# Patient Record
Sex: FEMALE | Race: BLACK OR AFRICAN AMERICAN | NOT HISPANIC OR LATINO | Employment: FULL TIME | URBAN - METROPOLITAN AREA
[De-identification: names, ages, dates, MRNs, and addresses within clinical notes are randomized per-mention and may not be internally consistent; named-entity substitution may affect disease eponyms.]

---

## 2017-02-02 ENCOUNTER — TELEPHONE (OUTPATIENT)
Dept: SURGERY | Facility: CLINIC | Age: 57
End: 2017-02-02

## 2017-02-02 NOTE — TELEPHONE ENCOUNTER
Left message for pt to call back to schedule appt w/Dr Rose on Monday, 2/6/17.Will await call back from pt.

## 2017-02-06 ENCOUNTER — OFFICE VISIT (OUTPATIENT)
Dept: SURGERY | Facility: CLINIC | Age: 57
End: 2017-02-06
Payer: COMMERCIAL

## 2017-02-06 ENCOUNTER — HOSPITAL ENCOUNTER (OUTPATIENT)
Dept: RADIOLOGY | Facility: HOSPITAL | Age: 57
Discharge: HOME | End: 2017-02-06
Attending: SURGERY
Payer: COMMERCIAL

## 2017-02-06 VITALS
DIASTOLIC BLOOD PRESSURE: 84 MMHG | HEIGHT: 65 IN | TEMPERATURE: 98 F | SYSTOLIC BLOOD PRESSURE: 157 MMHG | HEART RATE: 98 BPM

## 2017-02-06 DIAGNOSIS — C50.912 MALIGNANT NEOPLASM OF LEFT FEMALE BREAST, UNSPECIFIED SITE OF BREAST: ICD-10-CM

## 2017-02-06 DIAGNOSIS — I10 ESSENTIAL HYPERTENSION: ICD-10-CM

## 2017-02-06 DIAGNOSIS — C50.912 MALIGNANT NEOPLASM OF LEFT FEMALE BREAST, UNSPECIFIED SITE OF BREAST: Primary | ICD-10-CM

## 2017-02-06 PROCEDURE — 99999 PR PBB SHADOW E&M-EST. PATIENT-LVL III: CPT | Mod: PBBFAC,,, | Performed by: SURGERY

## 2017-02-06 PROCEDURE — 99205 OFFICE O/P NEW HI 60 MIN: CPT | Mod: S$GLB,,, | Performed by: SURGERY

## 2017-02-06 PROCEDURE — 3079F DIAST BP 80-89 MM HG: CPT | Mod: S$GLB,,, | Performed by: SURGERY

## 2017-02-06 PROCEDURE — 3077F SYST BP >= 140 MM HG: CPT | Mod: S$GLB,,, | Performed by: SURGERY

## 2017-02-06 PROCEDURE — 76642 ULTRASOUND BREAST LIMITED: CPT | Mod: 26,LT,, | Performed by: RADIOLOGY

## 2017-02-06 PROCEDURE — 76642 ULTRASOUND BREAST LIMITED: CPT | Mod: TC,LT

## 2017-02-06 RX ORDER — AMLODIPINE AND BENAZEPRIL HYDROCHLORIDE 10; 40 MG/1; MG/1
CAPSULE ORAL
Refills: 3 | COMMUNITY
Start: 2017-02-02 | End: 2018-03-08 | Stop reason: SDUPTHER

## 2017-02-06 RX ORDER — FERROUS GLUCONATE 324(38)MG
TABLET ORAL
Refills: 3 | COMMUNITY
Start: 2016-12-01 | End: 2018-04-12

## 2017-02-06 RX ORDER — POTASSIUM CHLORIDE 20 MEQ/1
TABLET, EXTENDED RELEASE ORAL
Refills: 3 | COMMUNITY
Start: 2016-12-26 | End: 2018-03-08

## 2017-02-06 RX ORDER — HYDROCHLOROTHIAZIDE 25 MG/1
TABLET ORAL
Refills: 3 | COMMUNITY
Start: 2016-12-01 | End: 2018-03-08

## 2017-02-06 RX ORDER — METOPROLOL SUCCINATE 50 MG/1
TABLET, EXTENDED RELEASE ORAL
Refills: 3 | COMMUNITY
Start: 2016-12-01 | End: 2018-03-08

## 2017-02-06 NOTE — PROGRESS NOTES
Breast Surgery  [facility]  Department of Surgery      REFERRING PROVIDER: Alfredo English MD  [address] [place] [id number]    Chief Complaint: Consult (Consults  /Triple Neg Breast Cancer)      Subjective:      Patient ID: Ermelinda Verde is a 56 y.o. female who presents with newly diagnosed left breast cancer.    She felt a palpable abnormality in 2017 and was evaluated by her PCP who referred her to Dr. English for evaluation.     Follow-up mammogram (left breast) showed a 1.75 x 1.51 x 1.96cm lobulated, irregular solid mass in the left upper quadrant at 2 o'clock. An ultrasound guided biopsy was performed on 17 with pathology revealing infiltrating mammary carcinoma of the left breast.    Patient does routinely do self breast exams.  Patient has noted a change on breast exam.  Patient admits to left nipple discharge that is serous, spontaneous and intermittent and that has been present for at least 5 years. Patient denies to previous breast biopsy. Patient denies a personal history of breast cancer. She does have a sister who was diagnosed with breast cancer at age 52 or 53. Her sister underwent bilateral mastectomy with reconstruction; she did not need chemotherapy. Her sister might have had genetic testing.    Findings at that time were the following:   Tumor size: 1.75 x 1.51 x 1.96 cm   Tumor ndgndrndanddndend:nd nd2nd Estrogen Receptor: negative   Progesterone Receptor: negative   Her-2 brendan: negative   Lymph node status: unknown   Lymphatic invasion: unknown     GYN History:  Age of menarche was 11.  Last menstrual period was 2016. Patient denies hormonal therapy. Patient is . Age of first live birth was 21. Patient did breast feed for ~3 months per child.    Past Medical History   Diagnosis Date    Hypertension      Past Surgical History   Procedure Laterality Date    D&c       section      Uterine fibroid surgery       No current outpatient prescriptions on file  "prior to visit.     No current facility-administered medications on file prior to visit.      Social History     Social History    Marital status:      Spouse name: N/A    Number of children: N/A    Years of education: N/A     Occupational History    Not on file.     Social History Main Topics    Smoking status: Never Smoker    Smokeless tobacco: Not on file    Alcohol use Yes    Drug use: No    Sexual activity: Not on file     Other Topics Concern    Not on file     Social History Narrative    No narrative on file     Family History   Problem Relation Age of Onset    Heart disease Father     Breast cancer Sister      at age 52 or 53        Review of Systems   Constitutional: Negative for activity change and unexpected weight change.   Respiratory: Negative for cough and chest tightness.    Cardiovascular: Negative for chest pain, palpitations and leg swelling.   Gastrointestinal: Negative for abdominal distention, abdominal pain, blood in stool, constipation and nausea.   Endocrine: Negative.    Musculoskeletal: Negative for joint swelling, myalgias and neck stiffness.        Pain from having scoliosis   Skin: Negative for color change and wound.   Hematological: Negative.    Psychiatric/Behavioral: Negative.      Objective:     Visit Vitals    BP (!) 157/84 (BP Location: Right arm, Patient Position: Sitting, BP Method: Automatic)    Pulse 98    Temp 97.5 °F (36.4 °C)    Ht 5' 5" (1.651 m)    LMP 02/06/2016       Physical Exam   Constitutional: She is oriented to person, place, and time. She appears well-developed and well-nourished.   HENT:   Head: Normocephalic and atraumatic.   Cardiovascular: Normal rate, regular rhythm and normal heart sounds.    Pulmonary/Chest: Effort normal and breath sounds normal. No respiratory distress. She exhibits no tenderness. Right breast exhibits no inverted nipple, no mass, no nipple discharge, no skin change and no tenderness. Left breast exhibits mass " and nipple discharge. Left breast exhibits no inverted nipple, no skin change and no tenderness. Breasts are symmetrical.       Firm, mobile mass, non-adherent to skin or chest wall   Abdominal: Soft. Bowel sounds are normal. She exhibits no distension and no mass. There is no tenderness.   Musculoskeletal: She exhibits deformity.   Right clavicle more prominent than left clavicle at sternal edge   Neurological: She is alert and oriented to person, place, and time. She has normal reflexes.   Skin: Skin is warm and dry.         Radiology review: Images personally reviewed by me in the clinic.   Mammogram:    Ultrasound: lobulated hypoechoic lesion measuring 1.75 x 1.96 cm.    Clinic US today:  L br 2OC 10CFN  2.36x1.54 cm mass with irregular borders.  L axilla with multiple visible mostly normal appearing nodes      US today:  LEFT LIMITED ULTRASOUND FINDINGS:  There is an irregular solid mass measuring 2.5 x 2.2 x 1.5 cm seen in the left  breast at 10 o'clock located 7 centimeters from the nipple.    Targeted ultrasound was performed in the left axilla and there is no evidence of  lymphadenopathy.     Impression     Solid mass in the left breast is a known malignancy.    The patient reports that no biopsy marker was placed at the time of biopsy and  no outside post-procedure mammogram was provided for review.  If neoadjuvant  chemotherapy is planned, pre-therapy placement of marker is recommended.    There is no evidence of left axillary adenopathy.    ACR BI-RADS Category 6: Known breast carcinoma      JUANCHO MAYFIELD M.D.         Assessment:       Invasive Mammary Carcinoma UOQ L breast  Plan:     Options for management were discussed with the patient and her family. We reviewed the existing data noting the equivalency of breast conserving surgery with radiation therapy and mastectomy. We also reviewed the guidelines of the National Comprehensive Cancer Network for Stage IIA breast carcinoma. We discussed the  need for lumpectomy margins to be negative for carcinoma, the necessity for postoperative radiation therapy after breast conservation in most cases, the possibility of a failed or false negative sentinel lymph node biopsy and the potential need for complete lymphadenectomy for a failed or positive sentinel lymph node biopsy were fully discussed. In the setting of mastectomy, delayed or immediate reconstruction options are available and were discussed.     In the setting of lumpectomy, radiation therapy would be recommended majority of the time.  The duration and treatment side effects were discussed with the patient.  This will coordinated with the radiation oncologist pending final pathology.    We also discussed the role of systemic therapy in the treatment of breast cancer.  We discussed that this is based on tumor biology and alana status and will be determined based on final pathology.  We discussed that if the cancer is hormone positive, endocrine therapy would be recommended in most cases and its use can reduce the risk of recurrence as well as improve survival. Side effects of treatment were briefly discussed. We also discussed the potential role for chemotherapy based on a number of factors such as tumor phenotype (ER+ vs. triple negative vs. Jbk4lrb+) and this would be determined in coordination with the medical oncologist.      She understands that with her triple negative status, she is a candidate for neoadjuvant chemotherapy.  She has a sister who had breast cancer and was found to be gene positive for a mutation although she does not know the details.  She will work on obtaining her sister's gene results before her genetics appointment.  We have set her up for genetic counseling.  We will have her slides reviewed by path and potentially tested for ductal vs lobular in origin of her mammary carcinoma.  Radiology will also review her outside cynthia and if she undergoes neoadjuvant chemotherapy, we will  need to place a clip in the tumor since she did not have one placed at the time of biopsy.  An MRI has been scheduled for assessment of pre-neoadjuvant tumor and alana status for comparison of response.  A medical oncology appointment has been requested through [facility] for first available.  Axillary US today was negative for LAD.    She is unsure of what procedure she would want but is strongly considering bilateral mastectomies with reconstruction.    Patient was educated on breast cancer, receptors, wire localization lumpectomy, mastectomy, sentinel lymph node mapping and biopsy, axillary lymph node dissection, reconstruction, breast prosthesis with post-mastectomy bra and radiation therapy. Patient was given patient information binder including [facility] breast cancer treatment brochure.  All her questions were answered.    Total time spent with the patient: 70 minutes.  50 minutes of face to face consultation and 20 minutes of chart review and coordination of care.

## 2017-02-06 NOTE — PROGRESS NOTES
Distress Screening Results: Psychosocial Distress screening score of Distress Score: 1 noted and reviewed. No intervention indicated.

## 2017-02-06 NOTE — LETTER
February 6, 2017      Alfredo English MD  [address] [place] [id number]           [facility] Breast Surgery  [address]  [facility] [address]  Phone: [phone]          Patient: Ermelinda Verde   MR Number: [id number]   YOB: 1960   Date of Visit: 2/6/2017       Dear Dr. Alfredo English:    Thank you for referring Ermelinda Verde to me for evaluation. Attached you will find relevant portions of my assessment and plan of care.    If you have questions, please do not hesitate to call me. I look forward to following Ermelinda Verde along with you.    Sincerely,    Regina Rose MD    Enclosure  CC:  No Recipients    If you would like to receive this communication electronically, please contact [email or web address] or [phone] to request more information on [employer] Link access.    For providers and/or their staff who would like to refer a patient to [facility], please contact us through our one-stop-shop provider referral line, [facility], at [phone].    If you feel you have received this communication in error or would no longer like to receive these types of communications, please e-mail [email or web address]

## 2017-02-07 ENCOUNTER — HOSPITAL ENCOUNTER (OUTPATIENT)
Dept: RADIOLOGY | Facility: HOSPITAL | Age: 57
Discharge: HOME | End: 2017-02-07
Attending: SURGERY

## 2017-02-08 ENCOUNTER — TELEPHONE (OUTPATIENT)
Dept: HEMATOLOGY/ONCOLOGY | Facility: CLINIC | Age: 57
End: 2017-02-08

## 2017-02-08 DIAGNOSIS — C50.912 MALIGNANT NEOPLASM OF LEFT FEMALE BREAST, UNSPECIFIED SITE OF BREAST: Primary | ICD-10-CM

## 2017-02-08 NOTE — TELEPHONE ENCOUNTER
----- Message from Fouzia Maxwell sent at 2/7/2017  4:31 PM CST -----  Contact: self  Pt is returning a missed phone call.  Contact number [phone]

## 2017-02-09 ENCOUNTER — INITIAL CONSULT (OUTPATIENT)
Dept: HEMATOLOGY/ONCOLOGY | Facility: CLINIC | Age: 57
End: 2017-02-09
Attending: INTERNAL MEDICINE
Payer: COMMERCIAL

## 2017-02-09 VITALS
RESPIRATION RATE: 16 BRPM | SYSTOLIC BLOOD PRESSURE: 152 MMHG | WEIGHT: 213.63 LBS | HEART RATE: 97 BPM | TEMPERATURE: 98 F | DIASTOLIC BLOOD PRESSURE: 73 MMHG | BODY MASS INDEX: 35.55 KG/M2

## 2017-02-09 DIAGNOSIS — C50.412 BREAST CANCER OF UPPER-OUTER QUADRANT OF LEFT FEMALE BREAST: ICD-10-CM

## 2017-02-09 PROCEDURE — 99205 OFFICE O/P NEW HI 60 MIN: CPT | Mod: S$GLB,,, | Performed by: INTERNAL MEDICINE

## 2017-02-09 PROCEDURE — 3078F DIAST BP <80 MM HG: CPT | Mod: S$GLB,,, | Performed by: INTERNAL MEDICINE

## 2017-02-09 PROCEDURE — 99999 PR PBB SHADOW E&M-EST. PATIENT-LVL III: CPT | Mod: PBBFAC,,, | Performed by: INTERNAL MEDICINE

## 2017-02-09 PROCEDURE — 3077F SYST BP >= 140 MM HG: CPT | Mod: S$GLB,,, | Performed by: INTERNAL MEDICINE

## 2017-02-09 NOTE — LETTER
February 9, 2017      Regina Rose MD  [address]  [facility] [address]           [facility] - Hematology Oncology  [address]  [address]  [facility] [address]  Phone: [phone]          Patient: Ermelinda Verde   MR Number: [id number]   YOB: 1960   Date of Visit: 2/9/2017       Dear Dr. Regina Rose:    Thank you for referring Ermelinda Verde to me for evaluation. Attached you will find relevant portions of my assessment and plan of care.    If you have questions, please do not hesitate to call me. I look forward to following Ermelinda Verde along with you.    Sincerely,    Alex Reyna MD    Enclosure  CC:  No Recipients    If you would like to receive this communication electronically, please contact [email or web address] or [phone] to request more information on [employer] Link access.    For providers and/or their staff who would like to refer a patient to [facility], please contact us through our one-stop-shop provider referral line, [facility], at [phone].    If you feel you have received this communication in error or would no longer like to receive these types of communications, please e-mail [email or web address]

## 2017-02-09 NOTE — PROGRESS NOTES
Subjective:       Patient ID: Ermelinda Verde is a 56 y.o. female.    Chief Complaint: Breast Cancer    HPI Mrs Verde is  a very pleasant 56-year-old -American female referred by  for recent diagnosis of left breast cancer.    She developed a palpable abnormality in her left breast in January 2017 which she noted on self-examination.  A diagnostic mammogram on January 19 showed a greater than 1C meter nodule in the upper outer portion of left breast.  By ultrasound this was lobulated and hypoechoic measuring 1.75 x 1.51 x 1.96 cm.    On January 24, 2017 a core needle biopsy was performed which showed infiltrating ductal carcinoma, high grade.  The tumor was ER negative, OR negative, and HER-2 negative.  A follow-up ultrasound on December 6 showed 2.5 x 2.2 x 1.5 cm left breast mass.  There was no abnormality noted in the left axilla.  Mammogram -     The patient is seen for discussion regarding neoadjuvant therapy.    Menstrual History:    Menarche - 11   G -4   P - 3  First birth age -21 ,BCP -15 years    Menopause - last menstrual period June 2016.  D&C done at that time      HRT - no    Family History -                                 Breast - sister with breast cancer in her early 50s-questionable positive genetic testing                                Ovarian -negative                               Social History :    Smoking -  never           ETOH -occasional    Works as an       PMH: Hypertension, otherwise healthy    Review of Systems   Constitutional: Negative for activity change, appetite change, fatigue, fever and unexpected weight change.        Does not exercise   HENT: Positive for congestion and postnasal drip.    Respiratory: Positive for cough (occasional dry cough-related to ALLERGIES).    Gastrointestinal: Negative.         Colonoscopy 2011   Genitourinary: Negative.    Musculoskeletal: Positive for arthralgias (occasional knee and left hip pain).    Neurological: Negative.    Psychiatric/Behavioral: Negative.        Objective:      Physical Exam   Constitutional: She is oriented to person, place, and time. She appears well-developed and well-nourished. No distress.   HENT:   Head: Normocephalic and atraumatic.   Mouth/Throat: No oropharyngeal exudate.   Eyes: Conjunctivae and EOM are normal. Pupils are equal, round, and reactive to light. No scleral icterus.   Neck: Normal range of motion. Neck supple. No JVD present. No thyromegaly present.   Cardiovascular: Normal rate and normal heart sounds.    Pulmonary/Chest: Effort normal and breath sounds normal. She has no wheezes. She has no rales. Right breast exhibits no mass, no nipple discharge and no skin change. Left breast exhibits mass. Left breast exhibits no nipple discharge and no skin change.       Abdominal: Soft. She exhibits no mass. There is no tenderness.   Musculoskeletal: She exhibits no edema.   Lymphadenopathy:     She has no cervical adenopathy.     She has no axillary adenopathy.        Right: No supraclavicular adenopathy present.        Left: No supraclavicular adenopathy present.   Neurological: She is alert and oriented to person, place, and time. No cranial nerve deficit.   Skin: No rash noted. No erythema.   Psychiatric: She has a normal mood and affect. Her behavior is normal. Thought content normal.   Vitals reviewed.      Assessment:       1. Breast cancer of upper-outer quadrant of left female breast        Plan:       I discussed the rationale for neoadjuvant chemotherapy.  She has a borderline T1-T2 lesion with clinically negative nodes.  Chemotherapy would be an excellent plan especially if she is to have mastectomies with reconstruction.  It would be helpful to know her lymph node status in terms of optimizing her chemotherapy regimen and perhaps a sentinel lymph node biopsy could be performed at the time of her port placement.  She is also due for an MRI of the breast which may  be helpful as well.    I discussed general side effects of chemotherapy including alopecia, bone marrow suppression and gastrointestinal effects.  I'll see her back after additional information is available.

## 2017-02-13 ENCOUNTER — HOSPITAL ENCOUNTER (OUTPATIENT)
Dept: RADIOLOGY | Facility: HOSPITAL | Age: 57
Discharge: HOME | End: 2017-02-13
Attending: SURGERY
Payer: COMMERCIAL

## 2017-02-13 DIAGNOSIS — C50.912 MALIGNANT NEOPLASM OF LEFT FEMALE BREAST, UNSPECIFIED SITE OF BREAST: ICD-10-CM

## 2017-02-13 PROCEDURE — 63600175 PHARM REV CODE 636 W HCPCS: Performed by: RADIOLOGY

## 2017-02-13 PROCEDURE — 0159T MRI BREAST BILATERAL: CPT | Mod: TC

## 2017-02-13 PROCEDURE — A9577 INJ MULTIHANCE: HCPCS | Performed by: SURGERY

## 2017-02-13 PROCEDURE — 0159T MRI BREAST BILATERAL: CPT | Mod: 26,,, | Performed by: RADIOLOGY

## 2017-02-13 PROCEDURE — 88321 CONSLTJ&REPRT SLD PREP ELSWR: CPT | Mod: ,,, | Performed by: PATHOLOGY

## 2017-02-13 PROCEDURE — 77059 MRI BREAST BILATERAL: CPT | Mod: 26,,, | Performed by: RADIOLOGY

## 2017-02-13 PROCEDURE — 25500020 PHARM REV CODE 255: Performed by: SURGERY

## 2017-02-13 RX ORDER — MIDAZOLAM HYDROCHLORIDE 1 MG/ML
2 INJECTION INTRAMUSCULAR; INTRAVENOUS ONCE
Status: COMPLETED | OUTPATIENT
Start: 2017-02-13 | End: 2017-02-13

## 2017-02-13 RX ADMIN — MIDAZOLAM HYDROCHLORIDE 2 MG: 1 INJECTION, SOLUTION INTRAMUSCULAR; INTRAVENOUS at 08:02

## 2017-02-13 RX ADMIN — GADOBENATE DIMEGLUMINE 20 ML: 529 INJECTION, SOLUTION INTRAVENOUS at 08:02

## 2017-02-14 NOTE — PROGRESS NOTES
MD ordered versed 2mg iv as an anxiolytic for MRI, NKDA, pts  present in waiting room & will drive pt home, pt instructed not to drive until ashley am, pt verbalized understanding of all above

## 2017-02-15 ENCOUNTER — OFFICE VISIT (OUTPATIENT)
Dept: SURGERY | Facility: CLINIC | Age: 57
End: 2017-02-15
Payer: COMMERCIAL

## 2017-02-15 ENCOUNTER — HOSPITAL ENCOUNTER (OUTPATIENT)
Dept: RADIOLOGY | Facility: HOSPITAL | Age: 57
Discharge: HOME | End: 2017-02-15
Attending: SURGERY
Payer: COMMERCIAL

## 2017-02-15 DIAGNOSIS — C50.919 TRIPLE NEGATIVE MALIGNANT NEOPLASM OF BREAST: ICD-10-CM

## 2017-02-15 DIAGNOSIS — C50.919 BREAST CANCER: ICD-10-CM

## 2017-02-15 DIAGNOSIS — Z80.3 FAMILY HISTORY OF BREAST CANCER: ICD-10-CM

## 2017-02-15 PROCEDURE — 77065 DX MAMMO INCL CAD UNI: CPT | Mod: 26,LT,, | Performed by: RADIOLOGY

## 2017-02-15 PROCEDURE — 19285 PERQ DEV BREAST 1ST US IMAG: CPT | Mod: ,,, | Performed by: RADIOLOGY

## 2017-02-15 PROCEDURE — 77065 DX MAMMO INCL CAD UNI: CPT | Mod: TC,LT

## 2017-02-15 PROCEDURE — 19285 PERQ DEV BREAST 1ST US IMAG: CPT | Mod: TC

## 2017-02-15 PROCEDURE — A4648 IMPLANTABLE TISSUE MARKER: HCPCS

## 2017-02-15 PROCEDURE — 99213 OFFICE O/P EST LOW 20 MIN: CPT | Mod: S$GLB,,, | Performed by: NURSE PRACTITIONER

## 2017-02-15 NOTE — PROGRESS NOTES
Patient presents with her  to discuss genetic counseling, recent diagnosis of TNB, see pedigree for full family history, significant for a sister with breast cancer diagnosed at 52. Option for genetic testing discussed per current NCCN guidelines. Discussed sporadic verses family clustering verses sporadic cancer, types of cancer susceptibility genes, possible results and implications for self and family, and cost of testing/insurance. She desires to proceed with testing, informed consent obtained, buccal sample collected and sent to [facility] for Integrated [facility]AC[facility] with [facility]. Results expected in 2-3 weeks    Time in counseling 40 min, total time 40 min

## 2017-02-21 ENCOUNTER — TELEPHONE (OUTPATIENT)
Dept: HEMATOLOGY/ONCOLOGY | Facility: CLINIC | Age: 57
End: 2017-02-21

## 2017-02-21 NOTE — TELEPHONE ENCOUNTER
----- Message from Myrtle Johnson MA sent at 2/21/2017  2:30 PM CST -----  Please call with this appt March 1 at 12:00  ----- Message -----     From: Alex Reyna MD     Sent: 2/21/2017   1:48 PM       To: Myrtle Johnson MA    Will need to see her next week - 3/1 at 12  ----- Message -----     From: Regina Rose MD     Sent: 2/21/2017   8:20 AM       To: MD Dr. Axel Salazar,    Mrs. Ermelinda Verde (MRN [id number]) is having her port placed and sentinel node biopsy by Dr. English at [facility] tomorrow, 2/22.    I think your team was waiting to coordinate follow up/treatment dates for her depending on that, so I just wanted to update you.    Regina

## 2017-02-21 NOTE — TELEPHONE ENCOUNTER
Left message letting pt know about her apt on 3/1 with Dr. Reyna.Asked pt to call back with any concerns.

## 2017-03-01 ENCOUNTER — DOCUMENTATION ONLY (OUTPATIENT)
Dept: SURGERY | Facility: CLINIC | Age: 57
End: 2017-03-01

## 2017-03-01 ENCOUNTER — OFFICE VISIT (OUTPATIENT)
Dept: HEMATOLOGY/ONCOLOGY | Facility: CLINIC | Age: 57
End: 2017-03-01
Payer: COMMERCIAL

## 2017-03-01 VITALS — WEIGHT: 210.56 LBS | BODY MASS INDEX: 35.04 KG/M2

## 2017-03-01 DIAGNOSIS — Z51.11 ENCOUNTER FOR ANTINEOPLASTIC CHEMOTHERAPY: ICD-10-CM

## 2017-03-01 DIAGNOSIS — C50.412 BREAST CANCER OF UPPER-OUTER QUADRANT OF LEFT FEMALE BREAST: Primary | ICD-10-CM

## 2017-03-01 PROCEDURE — 99213 OFFICE O/P EST LOW 20 MIN: CPT | Mod: S$GLB,,, | Performed by: INTERNAL MEDICINE

## 2017-03-01 PROCEDURE — 1160F RVW MEDS BY RX/DR IN RCRD: CPT | Mod: S$GLB,,, | Performed by: INTERNAL MEDICINE

## 2017-03-01 PROCEDURE — 99999 PR PBB SHADOW E&M-EST. PATIENT-LVL I: CPT | Mod: PBBFAC,,, | Performed by: INTERNAL MEDICINE

## 2017-03-01 RX ORDER — DEXAMETHASONE 4 MG/1
TABLET ORAL
Qty: 40 TABLET | Refills: 0 | Status: SHIPPED | OUTPATIENT
Start: 2017-03-01 | End: 2017-06-20 | Stop reason: CLARIF

## 2017-03-01 RX ORDER — ONDANSETRON 4 MG/1
4 TABLET, ORALLY DISINTEGRATING ORAL EVERY 6 HOURS PRN
Qty: 20 TABLET | Refills: 3 | Status: SHIPPED | OUTPATIENT
Start: 2017-03-01 | End: 2017-03-08

## 2017-03-01 NOTE — PROGRESS NOTES
Results received from [facility], negative Integrated BRACAnalysis with [facility]. She was phoned and results discussed, likely a family clustering of breast cancer.

## 2017-03-01 NOTE — PROGRESS NOTES
Subjective:       Patient ID: Ermelinda Verde is a 56 y.o. female.    Chief Complaint: No chief complaint on file.    HPI Mrs Verde is  a very pleasant 56-year-old -American female referred by  for a diagnosis of left breast cancer.  She is also a patient of .  At the time of her last visit we had discussed neoadjuvant chemotherapy.  I recommended that she have a sentinel lymph node biopsy in order to help determine how much preoperative therapy would be indicated.  She underwent sentinel lymph node biopsy on February 22.  That showed 4 negative lymph nodes.    She developed a palpable abnormality in her left breast in January 2017 which she noted on self-examination.  A diagnostic mammogram on January 19 showed a greater than 1 cm nodule in the upper outer portion of left breast.  By ultrasound this was lobulated and hypoechoic measuring 1.75 x 1.51 x 1.96 cm.    On January 24, 2017 a core needle biopsy was performed which showed infiltrating ductal carcinoma, high grade.  The tumor was ER negative, W[place] negative, and HER-2 negative.  A follow-up ultrasound on December 6 showed 2.5 x 2.2 x 1.5 cm left breast mass.  There was no abnormality noted in the left axilla.        Review of Systems    Objective:      Physical Exam   Constitutional: She is oriented to person, place, and time. She appears well-developed and well-nourished.   HENT:   Head: Normocephalic and atraumatic.   Lymphadenopathy:     She has no axillary adenopathy.        Right: No supraclavicular adenopathy present.        Left: No supraclavicular adenopathy present.   Neurological: She is alert and oriented to person, place, and time.   Psychiatric: She has a normal mood and affect. Her behavior is normal. Thought content normal.       Assessment:       1. Breast cancer of upper-outer quadrant of left female breast    2. Encounter for antineoplastic chemotherapy        Plan:       I again reviewed the rationale for  neoadjuvant chemotherapy.  As she is pathologically node-negative I recommended that she received 4 cycles of docetaxel and Cytoxan therapy..    I discussed  side effects of chemotherapy including alopecia, bone marrow suppression and gastrointestinal effects.      Written informed consent was executed.    She would like to start next week.  The day 2 Neulasta and likely will set that up at [facility]

## 2017-03-01 NOTE — MR AVS SNAPSHOT
[facility]  [address]  [facility] [address]  Phone: [phone]                  Ermelinda Verde   3/1/2017 12:00 PM   Office Visit    Description:  Female : 1960   Provider:  Alex Reyna MD   Department:  [facility]           Diagnoses this Visit        Comments    Breast cancer of upper-outer quadrant of left female breast    -  Primary     Encounter for antineoplastic chemotherapy                To Do List           Goals (5 Years of Data)     None      Follow-Up and Disposition     Return in about 4 weeks (around 3/29/2017).       These Medications        Disp Refills Start End    dexamethasone (DECADRON) 4 MG Tab 40 tablet 0 3/1/2017     Take the day before and the day after chemotherapy.    Pharmacy: [facility] AT [facility] Ph #: [phone]       ondansetron (ZOFRAN-ODT) 4 MG TbDL 20 tablet 3 3/1/2017 3/8/2017    Take 1 tablet (4 mg total) by mouth every 6 (six) hours as needed. For nausea - Oral    Pharmacy: [facility] [place] - [address] AT [facility] Ph #: [phone]         [facility] On Call     [facility] On Call Nurse Care Line - 24/7 Assistance  Registered nurses in the [facility] provide clinical advisement, health education, appointment booking, and other advisory services.  Call for this free service at [phone].             Medications           START taking these NEW medications        Refills    dexamethasone (DECADRON) 4 MG Tab 0    Sig: Take the day before and the day after chemotherapy.    Class: Normal    ondansetron (ZOFRAN-ODT) 4 MG TbDL 3    Sig: Take 1 tablet (4 mg total) by mouth every 6 (six) hours as needed. For nausea    Class: Normal    Route: Oral           Verify that the below list of medications is an accurate representation of the medications you are currently taking.  If none  reported, the list may be blank. If incorrect, please contact your healthcare provider. Carry this list with you in case of emergency.           Current Medications     amlodipine-benazepril (LOTREL) 10-40 mg per capsule TK 1 C PO QD    dexamethasone (DECADRON) 4 MG Tab Take the day before and the day after chemotherapy.    ferrous gluconate (FERGON) 324 MG tablet TK 1 T PO QD    hydrochlorothiazide (HYDRODIURIL) 25 MG tablet TK 1 T PO ONCE A DAY    hydrocodone-acetaminophen 5-325mg (NORCO) 5-325 mg per tablet Take 1-2 every 6 hours prn pain.    metoprolol succinate (TOPROL-XL) 50 MG 24 hr tablet TK 1/2 T PO QD    ondansetron (ZOFRAN-ODT) 4 MG TbDL Take 1 tablet (4 mg total) by mouth every 6 (six) hours as needed. For nausea    potassium chloride SA (K-DUR,KLOR-CON) 20 MEQ tablet TK 1 T PO  QD           Clinical Reference Information           Your Vitals Were     Weight                   95.5 kg (210 lb 8.6 oz)           Allergies as of 3/1/2017     No Known Allergies      Immunizations Administered on Date of Encounter - 3/1/2017     None      [facility] Sign-Up     Activating your [facility] account is as easy as 1-2-3!     1) Visit [email or web address], select Sign Up Now, enter this activation code and your date of birth, then select Next.  [email or web address]  Expires: 3/31/2017  4:22 PM      2) Create a username and password to use when you visit [facility] in the future and select a security question in case you lose your password and select Next.    3) Enter your e-mail address and click Sign Up!    Additional Information  If you have questions, please e-mail [email or web address] or call [phone] to talk to our [facility] staff. Remember, [facility] is NOT to be used for urgent needs. For medical emergencies, dial 911.         Language Assistance Services     ATTENTION: Language assistance services are available, free of charge. Please call [phone].      ATENCIÓN: Si kendra corrales, irma a kwong disposición  servicios gratuitos de asistencia lingüística. Nydia spencer [phone].     [facility] Ý: N?u b?n nói Ti?ng Vi?t, có các d?ch v? h? tr? ngôn ng? mi?n phí dành cho b?n. G?i s? [phone].         [facility] complies with applicable Federal civil rights laws and does not discriminate on the basis of race, color, national origin, age, disability, or sex.

## 2017-03-07 ENCOUNTER — NURSE TRIAGE (OUTPATIENT)
Dept: ADMINISTRATIVE | Facility: CLINIC | Age: 57
End: 2017-03-07

## 2017-03-07 ENCOUNTER — INFUSION (OUTPATIENT)
Dept: INFUSION THERAPY | Facility: HOSPITAL | Age: 57
End: 2017-03-07
Attending: INTERNAL MEDICINE
Payer: COMMERCIAL

## 2017-03-07 ENCOUNTER — LAB VISIT (OUTPATIENT)
Dept: LAB | Facility: HOSPITAL | Age: 57
End: 2017-03-07
Attending: INTERNAL MEDICINE
Payer: COMMERCIAL

## 2017-03-07 VITALS
DIASTOLIC BLOOD PRESSURE: 79 MMHG | HEIGHT: 65 IN | HEART RATE: 75 BPM | WEIGHT: 209 LBS | RESPIRATION RATE: 18 BRPM | BODY MASS INDEX: 34.82 KG/M2 | SYSTOLIC BLOOD PRESSURE: 134 MMHG | TEMPERATURE: 98 F

## 2017-03-07 DIAGNOSIS — Z51.11 ENCOUNTER FOR ANTINEOPLASTIC CHEMOTHERAPY: ICD-10-CM

## 2017-03-07 DIAGNOSIS — C50.412 BREAST CANCER OF UPPER-OUTER QUADRANT OF LEFT FEMALE BREAST: ICD-10-CM

## 2017-03-07 DIAGNOSIS — Z51.11 ENCOUNTER FOR ANTINEOPLASTIC CHEMOTHERAPY: Primary | ICD-10-CM

## 2017-03-07 LAB
ALBUMIN SERPL BCP-MCNC: 3.6 G/DL
ALP SERPL-CCNC: 92 U/L
ALT SERPL W/O P-5'-P-CCNC: 9 U/L
ANION GAP SERPL CALC-SCNC: 9 MMOL/L
AST SERPL-CCNC: 17 U/L
BASOPHILS # BLD AUTO: 0.02 K/UL
BASOPHILS NFR BLD: 0.2 %
BILIRUB SERPL-MCNC: 0.3 MG/DL
BUN SERPL-MCNC: 11 MG/DL
CALCIUM SERPL-MCNC: 9.4 MG/DL
CHLORIDE SERPL-SCNC: 106 MMOL/L
CO2 SERPL-SCNC: 24 MMOL/L
CREAT SERPL-MCNC: 0.9 MG/DL
DIFFERENTIAL METHOD: ABNORMAL
EOSINOPHIL # BLD AUTO: 0.1 K/UL
EOSINOPHIL NFR BLD: 0.5 %
ERYTHROCYTE [DISTWIDTH] IN BLOOD BY AUTOMATED COUNT: 14.6 %
EST. GFR  (AFRICAN AMERICAN): >60 ML/MIN/1.73 M^2
EST. GFR  (NON AFRICAN AMERICAN): >60 ML/MIN/1.73 M^2
GLUCOSE SERPL-MCNC: 65 MG/DL
HCT VFR BLD AUTO: 39.7 %
HGB BLD-MCNC: 12.5 G/DL
LYMPHOCYTES # BLD AUTO: 2.4 K/UL
LYMPHOCYTES NFR BLD: 23.6 %
MCH RBC QN AUTO: 26.3 PG
MCHC RBC AUTO-ENTMCNC: 31.5 %
MCV RBC AUTO: 84 FL
MONOCYTES # BLD AUTO: 0.9 K/UL
MONOCYTES NFR BLD: 9.2 %
NEUTROPHILS # BLD AUTO: 6.7 K/UL
NEUTROPHILS NFR BLD: 66.3 %
PLATELET # BLD AUTO: 247 K/UL
PMV BLD AUTO: 9.1 FL
POTASSIUM SERPL-SCNC: 3.9 MMOL/L
PROT SERPL-MCNC: 7.9 G/DL
RBC # BLD AUTO: 4.75 M/UL
SODIUM SERPL-SCNC: 139 MMOL/L
WBC # BLD AUTO: 10.14 K/UL

## 2017-03-07 PROCEDURE — 80053 COMPREHEN METABOLIC PANEL: CPT

## 2017-03-07 PROCEDURE — 85025 COMPLETE CBC W/AUTO DIFF WBC: CPT

## 2017-03-07 PROCEDURE — 96377 APPLICATON ON-BODY INJECTOR: CPT

## 2017-03-07 PROCEDURE — 63600175 PHARM REV CODE 636 W HCPCS: Performed by: INTERNAL MEDICINE

## 2017-03-07 PROCEDURE — 25000003 PHARM REV CODE 250: Performed by: INTERNAL MEDICINE

## 2017-03-07 PROCEDURE — 96411 CHEMO IV PUSH ADDL DRUG: CPT

## 2017-03-07 PROCEDURE — 96413 CHEMO IV INFUSION 1 HR: CPT

## 2017-03-07 PROCEDURE — 36415 COLL VENOUS BLD VENIPUNCTURE: CPT

## 2017-03-07 PROCEDURE — 96367 TX/PROPH/DG ADDL SEQ IV INF: CPT

## 2017-03-07 RX ORDER — SODIUM CHLORIDE 0.9 % (FLUSH) 0.9 %
10 SYRINGE (ML) INJECTION
Status: DISCONTINUED | OUTPATIENT
Start: 2017-03-07 | End: 2017-03-07 | Stop reason: HOSPADM

## 2017-03-07 RX ORDER — HEPARIN 100 UNIT/ML
500 SYRINGE INTRAVENOUS
Status: DISCONTINUED | OUTPATIENT
Start: 2017-03-07 | End: 2017-03-07 | Stop reason: HOSPADM

## 2017-03-07 RX ADMIN — CYCLOPHOSPHAMIDE 1255 MG: 1 INJECTION, POWDER, FOR SOLUTION INTRAVENOUS; ORAL at 01:03

## 2017-03-07 RX ADMIN — SODIUM CHLORIDE, PRESERVATIVE FREE 10 ML: 5 INJECTION INTRAVENOUS at 02:03

## 2017-03-07 RX ADMIN — SODIUM CHLORIDE: 0.9 INJECTION, SOLUTION INTRAVENOUS at 11:03

## 2017-03-07 RX ADMIN — SODIUM CHLORIDE 150 MG: 9 INJECTION, SOLUTION INTRAVENOUS at 11:03

## 2017-03-07 RX ADMIN — DOCETAXEL ANHYDROUS 157.5 MG: 10 INJECTION, SOLUTION INTRAVENOUS at 12:03

## 2017-03-07 RX ADMIN — PEGFILGRASTIM 6 MG: KIT SUBCUTANEOUS at 02:03

## 2017-03-07 RX ADMIN — HEPARIN 500 UNITS: 100 SYRINGE at 02:03

## 2017-03-07 RX ADMIN — DEXAMETHASONE SODIUM PHOSPHATE 0.25 MG: 4 INJECTION, SOLUTION INTRAMUSCULAR; INTRAVENOUS at 11:03

## 2017-03-07 NOTE — MR AVS SNAPSHOT
Patient Information     Patient Name Sex Ermelinda Wilson Female 1960      Visit Information        Provider Department [facility]    3/7/2017 11:00 AM [facility], [facility] [phone] Adolfo Moreira      Patient Instructions     None      Your Current Medications Are     amlodipine-benazepril (LOTREL) 10-40 mg per capsule    dexamethasone (DECADRON) 4 MG Tab    ferrous gluconate (FERGON) 324 MG tablet    hydrochlorothiazide (HYDRODIURIL) 25 MG tablet    hydrocodone-acetaminophen 5-325mg (NORCO) 5-325 mg per tablet    metoprolol succinate (TOPROL-XL) 50 MG 24 hr tablet    ondansetron (ZOFRAN-ODT) 4 MG TbDL    potassium chloride SA (K-DUR,KLOR-CON) 20 MEQ tablet      Facility-Administered Medications     cyclophosphamide (CYTOXAN) 600 mg/m2 = 1,255 mg in sodium chloride 0.9% 250 mL chemo infusion    docetaxel (TAXOTERE) 75 mg/m2 = 157.5 mg in sodium chloride 0.9% 250 mL chemo infusion    fosaprepitant 150 mg in sodium chloride 0.9% 150 mL IVPB    heparin, porcine (PF) 100 unit/mL injection flush 500 Units    palonosetron 0.25mg/dexamethasone 10mg IVPB    pegfilgrastim (NEULASTA (ON BODY INJECTOR)) injection 6 mg    sodium chloride 0.9% 250 mL flush bag    sodium chloride 0.9% flush 10 mL      Appointments for Next Year     3/8/2017 10:45 AM POST-OP (15 min.) [facility] Alfredo English MD    Arrive at check-in approximately 15 minutes before your scheduled appointment time. Bring all outside medical records and imaging, along with a list of your current medications and insurance card.    3/28/2017 10:15 AM NON FASTING LAB (15 min.) [facility] LAB, HEMON SAME DAY    Arrive at check-in approximately 15 minutes before your scheduled appointment time. Bring all outside medical records and imaging, along with a list of your current medications and insurance card.    [facility] 3rd Floor    3/28/2017 11:15 AM ESTABLISHED PATIENT SHORT (15  "min.) [facility] Alex Reyna MD    Arrive at check-in approximately 15 minutes before your scheduled appointment time. Bring all outside medical records and imaging, along with a list of your current medications and insurance card.    [facility] - 3rd Floor    3/28/2017 11:30 AM INFUSION 180 MIN (180 min.) [facility], CHEMO    Arrive at check-in approximately 15 minutes before your scheduled appointment time. Bring all outside medical records and imaging, along with a list of your current medications and insurance card.    [facility], 5th Floor         Default Flowsheet Data (last 24 hours)      Amb Complex Vitals Derrell        03/07/17 1318 03/07/17 1102             Measurements    Weight  94.8 kg (209 lb)       Height  5' 5" (1.651 m)       BSA (Calculated - sq m)  2.08 sq meters       BMI (Calculated)  34.9       /79 139/79       Temp  98.2 °F (36.8 °C)       Pulse 75 86       Resp 18 20       Pain Assessment    Pain Score Zero Zero               Allergies     No Known Allergies      Medications You Received from 03/06/2017 1434 to 03/07/2017 1434        Date/Time Order Dose Route Action     03/07/2017 1323 cyclophosphamide (CYTOXAN) 600 mg/m2 = 1,255 mg in sodium chloride 0.9% 250 mL chemo infusion 1,255 mg Intravenous New Bag     03/07/2017 1214 docetaxel (TAXOTERE) 75 mg/m2 = 157.5 mg in sodium chloride 0.9% 250 mL chemo infusion 157.5 mg Intravenous New Bag     03/07/2017 1144 fosaprepitant 150 mg in sodium chloride 0.9% 150 mL IVPB 150 mg Intravenous New Bag     03/07/2017 1432 heparin, porcine (PF) 100 unit/mL injection flush 500 Units 500 Units Intravenous Given     03/07/2017 1121 palonosetron 0.25mg/dexamethasone 10mg IVPB 0.25 mg Intravenous New Bag     03/07/2017 1427 pegfilgrastim (NEULASTA (ON BODY INJECTOR)) injection 6 mg 6 mg Subcutaneous Given     03/07/2017 1121 sodium chloride 0.9% 250 mL flush bag   Intravenous New Bag     " 03/07/2017 1432 sodium chloride 0.9% flush 10 mL 10 mL Intravenous Given      Current Discharge Medication List     Cannot display discharge medications since this is not an admission.

## 2017-03-07 NOTE — PLAN OF CARE
Problem: Patient Care Overview (Adult)  Goal: Plan of Care Review  Outcome: Ongoing (interventions implemented as appropriate)  Pt tolerated 1st treatment of Taxotere/Cytoxan well.  Reviewed side effects and management of side effects of chemo.  Pt given Neulasta OBI video to watch.  OBI applied to pt and times of when medication will be delivered and when she can take the device off written down and given to pt.  All questions answered.  Instructed to call MD with any problems.

## 2017-03-08 NOTE — TELEPHONE ENCOUNTER
Reason for Disposition   Caller has NON-URGENT medication question about med that PCP prescribed and triager unable to answer question    Protocols used: ST MEDICATION QUESTION CALL-A-SHARAN Wadsworth called to question the decadron she is taking.  She said she thinks Yessenia Pete, today mentioned she should be taking the decadron 4 mg once in the morning, once in the evening, on the day before and the day after her infusion, but she is not sure.  Explained to her the epic medication list does not indicate any change from the order to take one 4 mg tablet the day before and the day after chemo infusion.  Nor did I see any note indicating the nurse or Dr Reyna wanted a change in how it was taken.  Encouraged her to call tomorrow morning for clarification, and assured her I will send a message to Dr Reyna's staff for any additional instruction.  Please contact caller directly with any additional care advice.

## 2017-03-13 ENCOUNTER — NURSE TRIAGE (OUTPATIENT)
Dept: ADMINISTRATIVE | Facility: CLINIC | Age: 57
End: 2017-03-13

## 2017-03-14 ENCOUNTER — TELEPHONE (OUTPATIENT)
Dept: HEMATOLOGY/ONCOLOGY | Facility: CLINIC | Age: 57
End: 2017-03-14

## 2017-03-14 NOTE — TELEPHONE ENCOUNTER
Called patient and she had questions about if she should work during chemo therapy. Patient said she went to work yesterday and had a pain in her back. I told the patient that would be her decision and it depended on how she was feeling and if she would be able to continue to work. I told her Dr Reyna would be happy to let her take a break while doing chemo. Patient will let us know

## 2017-03-14 NOTE — TELEPHONE ENCOUNTER
----- Message from Matthew Sanders sent at 3/14/2017  3:48 PM CDT -----  Contact: PT  Has a few questions regarding her symptoms. She has some concerns.     Call: [phone]

## 2017-03-25 ENCOUNTER — NURSE TRIAGE (OUTPATIENT)
Dept: ADMINISTRATIVE | Facility: CLINIC | Age: 57
End: 2017-03-25

## 2017-03-25 NOTE — TELEPHONE ENCOUNTER
Patient states 3 weeks post chemo her breast is swelling and reddened at the nipple and the nipple cannot be grasped.  She is concerned because this is happening overnight.

## 2017-03-25 NOTE — TELEPHONE ENCOUNTER
"  Reason for Disposition   Caller has URGENT medication question about med that PCP prescribed and triager unable to answer question    Answer Assessment - Initial Assessment Questions  1. SYMPTOMS: "Do you have any symptoms?"      Breast enlarged and redness at nipples.  2. SEVERITY: If symptoms are present, ask "Are they mild, moderate or severe?"      Patient concerns escalating.    Protocols used: ST MEDICATION QUESTION CALL-A-AH    "

## 2017-03-25 NOTE — TELEPHONE ENCOUNTER
Spoke to Dr. Garcia advised to call Dr. Walter. Called to Dr. Walter.  Advised to have patient report to the ED if any further changes are noted.  At this time patient is reporting breast swelling with redness ar the areola and inability to pinch nipple,.  No fever or pain at this time.  Changes has happened over night.   Only feeling of pain was sharp and happened a few days ago.  Patient voiced understanding of noting changes and going to the ED.

## 2017-03-28 ENCOUNTER — INFUSION (OUTPATIENT)
Dept: INFUSION THERAPY | Facility: HOSPITAL | Age: 57
End: 2017-03-28
Attending: INTERNAL MEDICINE
Payer: COMMERCIAL

## 2017-03-28 ENCOUNTER — LAB VISIT (OUTPATIENT)
Dept: LAB | Facility: HOSPITAL | Age: 57
End: 2017-03-28
Attending: INTERNAL MEDICINE
Payer: COMMERCIAL

## 2017-03-28 ENCOUNTER — OFFICE VISIT (OUTPATIENT)
Dept: HEMATOLOGY/ONCOLOGY | Facility: CLINIC | Age: 57
End: 2017-03-28
Payer: COMMERCIAL

## 2017-03-28 VITALS
BODY MASS INDEX: 35.16 KG/M2 | RESPIRATION RATE: 16 BRPM | TEMPERATURE: 98 F | DIASTOLIC BLOOD PRESSURE: 71 MMHG | WEIGHT: 211 LBS | HEIGHT: 65 IN | SYSTOLIC BLOOD PRESSURE: 131 MMHG | HEART RATE: 78 BPM

## 2017-03-28 VITALS
TEMPERATURE: 98 F | HEART RATE: 98 BPM | WEIGHT: 211.63 LBS | RESPIRATION RATE: 17 BRPM | SYSTOLIC BLOOD PRESSURE: 145 MMHG | DIASTOLIC BLOOD PRESSURE: 69 MMHG | BODY MASS INDEX: 35.22 KG/M2

## 2017-03-28 DIAGNOSIS — C50.412 BREAST CANCER OF UPPER-OUTER QUADRANT OF LEFT FEMALE BREAST: ICD-10-CM

## 2017-03-28 DIAGNOSIS — Z51.11 ENCOUNTER FOR ANTINEOPLASTIC CHEMOTHERAPY: ICD-10-CM

## 2017-03-28 DIAGNOSIS — Z51.11 ENCOUNTER FOR ANTINEOPLASTIC CHEMOTHERAPY: Primary | ICD-10-CM

## 2017-03-28 DIAGNOSIS — C50.412 BREAST CANCER OF UPPER-OUTER QUADRANT OF LEFT FEMALE BREAST: Primary | ICD-10-CM

## 2017-03-28 LAB
ALBUMIN SERPL BCP-MCNC: 3.4 G/DL
ALP SERPL-CCNC: 92 U/L
ALT SERPL W/O P-5'-P-CCNC: 9 U/L
ANION GAP SERPL CALC-SCNC: 8 MMOL/L
AST SERPL-CCNC: 15 U/L
BASOPHILS # BLD AUTO: 0.01 K/UL
BASOPHILS NFR BLD: 0.1 %
BILIRUB SERPL-MCNC: 0.2 MG/DL
BUN SERPL-MCNC: 8 MG/DL
CALCIUM SERPL-MCNC: 9.4 MG/DL
CHLORIDE SERPL-SCNC: 108 MMOL/L
CO2 SERPL-SCNC: 23 MMOL/L
CREAT SERPL-MCNC: 0.8 MG/DL
DIFFERENTIAL METHOD: ABNORMAL
EOSINOPHIL # BLD AUTO: 0 K/UL
EOSINOPHIL NFR BLD: 0 %
ERYTHROCYTE [DISTWIDTH] IN BLOOD BY AUTOMATED COUNT: 15.6 %
EST. GFR  (AFRICAN AMERICAN): >60 ML/MIN/1.73 M^2
EST. GFR  (NON AFRICAN AMERICAN): >60 ML/MIN/1.73 M^2
GLUCOSE SERPL-MCNC: 143 MG/DL
HCT VFR BLD AUTO: 35.2 %
HGB BLD-MCNC: 11.5 G/DL
LYMPHOCYTES # BLD AUTO: 0.9 K/UL
LYMPHOCYTES NFR BLD: 8.7 %
MCH RBC QN AUTO: 26.4 PG
MCHC RBC AUTO-ENTMCNC: 32.7 %
MCV RBC AUTO: 81 FL
MONOCYTES # BLD AUTO: 0.8 K/UL
MONOCYTES NFR BLD: 7.9 %
NEUTROPHILS # BLD AUTO: 8.6 K/UL
NEUTROPHILS NFR BLD: 83.1 %
PLATELET # BLD AUTO: 351 K/UL
PMV BLD AUTO: 8.8 FL
POTASSIUM SERPL-SCNC: 3.8 MMOL/L
PROT SERPL-MCNC: 7.4 G/DL
RBC # BLD AUTO: 4.36 M/UL
SODIUM SERPL-SCNC: 139 MMOL/L
WBC # BLD AUTO: 10.31 K/UL

## 2017-03-28 PROCEDURE — 99214 OFFICE O/P EST MOD 30 MIN: CPT | Mod: S$GLB,,, | Performed by: INTERNAL MEDICINE

## 2017-03-28 PROCEDURE — 1160F RVW MEDS BY RX/DR IN RCRD: CPT | Mod: S$GLB,,, | Performed by: INTERNAL MEDICINE

## 2017-03-28 PROCEDURE — 3077F SYST BP >= 140 MM HG: CPT | Mod: S$GLB,,, | Performed by: INTERNAL MEDICINE

## 2017-03-28 PROCEDURE — 36415 COLL VENOUS BLD VENIPUNCTURE: CPT

## 2017-03-28 PROCEDURE — 63600175 PHARM REV CODE 636 W HCPCS: Performed by: INTERNAL MEDICINE

## 2017-03-28 PROCEDURE — 80053 COMPREHEN METABOLIC PANEL: CPT

## 2017-03-28 PROCEDURE — 96413 CHEMO IV INFUSION 1 HR: CPT

## 2017-03-28 PROCEDURE — 96417 CHEMO IV INFUS EACH ADDL SEQ: CPT

## 2017-03-28 PROCEDURE — 96367 TX/PROPH/DG ADDL SEQ IV INF: CPT

## 2017-03-28 PROCEDURE — 25000003 PHARM REV CODE 250: Performed by: INTERNAL MEDICINE

## 2017-03-28 PROCEDURE — 3078F DIAST BP <80 MM HG: CPT | Mod: S$GLB,,, | Performed by: INTERNAL MEDICINE

## 2017-03-28 PROCEDURE — 99999 PR PBB SHADOW E&M-EST. PATIENT-LVL III: CPT | Mod: PBBFAC,,, | Performed by: INTERNAL MEDICINE

## 2017-03-28 PROCEDURE — 85025 COMPLETE CBC W/AUTO DIFF WBC: CPT

## 2017-03-28 PROCEDURE — 96377 APPLICATON ON-BODY INJECTOR: CPT

## 2017-03-28 RX ORDER — HEPARIN 100 UNIT/ML
500 SYRINGE INTRAVENOUS
Status: CANCELLED | OUTPATIENT
Start: 2017-03-28

## 2017-03-28 RX ORDER — SODIUM CHLORIDE 0.9 % (FLUSH) 0.9 %
10 SYRINGE (ML) INJECTION
Status: DISCONTINUED | OUTPATIENT
Start: 2017-03-28 | End: 2017-03-28 | Stop reason: HOSPADM

## 2017-03-28 RX ORDER — UBIDECARENONE 75 MG
500 CAPSULE ORAL DAILY
Status: ON HOLD | COMMUNITY
End: 2019-02-13 | Stop reason: HOSPADM

## 2017-03-28 RX ORDER — HEPARIN 100 UNIT/ML
500 SYRINGE INTRAVENOUS
Status: DISCONTINUED | OUTPATIENT
Start: 2017-03-28 | End: 2017-03-28 | Stop reason: HOSPADM

## 2017-03-28 RX ORDER — LIDOCAINE AND PRILOCAINE 25; 25 MG/G; MG/G
CREAM TOPICAL
Qty: 5 G | Refills: 1 | Status: SHIPPED | OUTPATIENT
Start: 2017-03-28 | End: 2017-06-20 | Stop reason: CLARIF

## 2017-03-28 RX ORDER — SODIUM CHLORIDE 0.9 % (FLUSH) 0.9 %
10 SYRINGE (ML) INJECTION
Status: CANCELLED | OUTPATIENT
Start: 2017-03-28

## 2017-03-28 RX ADMIN — DEXAMETHASONE SODIUM PHOSPHATE 0.25 MG: 4 INJECTION, SOLUTION INTRAMUSCULAR; INTRAVENOUS at 12:03

## 2017-03-28 RX ADMIN — SODIUM CHLORIDE 150 MG: 9 INJECTION, SOLUTION INTRAVENOUS at 01:03

## 2017-03-28 RX ADMIN — PEGFILGRASTIM 6 MG: KIT SUBCUTANEOUS at 04:03

## 2017-03-28 RX ADMIN — SODIUM CHLORIDE, PRESERVATIVE FREE 500 UNITS: 5 INJECTION INTRAVENOUS at 04:03

## 2017-03-28 RX ADMIN — SODIUM CHLORIDE, PRESERVATIVE FREE 10 ML: 5 INJECTION INTRAVENOUS at 04:03

## 2017-03-28 RX ADMIN — SODIUM CHLORIDE: 0.9 INJECTION, SOLUTION INTRAVENOUS at 12:03

## 2017-03-28 RX ADMIN — CYCLOPHOSPHAMIDE 1255 MG: 1 INJECTION, POWDER, FOR SOLUTION INTRAVENOUS; ORAL at 03:03

## 2017-03-28 RX ADMIN — DOCETAXEL ANHYDROUS 155 MG: 10 INJECTION, SOLUTION INTRAVENOUS at 02:03

## 2017-03-28 NOTE — PLAN OF CARE
Problem: Patient Care Overview  Goal: Plan of Care Review  Outcome: Ongoing (interventions implemented as appropriate)  Patient received Docetaxel and Cyclophophamide without any issues. Notified to call MD with any further concerns . No apparent distress noted.

## 2017-03-28 NOTE — PROGRESS NOTES
Subjective:       Patient ID: Ermelinda Verde is a 56 y.o. female.    Chief Complaint: Chemotherapy    HPI Mrs Verde is  a very pleasant 56-year-old -American female referred by  for a diagnosis of left breast cancer.  She is also a patient of .  She presents to continue neoadjuvant chemotherapy.  She is status post 1 cycle of Taxotere and Cytoxan.    Her major side effect with some musculoskeletal discomfort.  She had some loose bowels which were controlled by Imodium.  She had no oral sores.  Tongue did get thick and coated and white with loss of taste.  She's had no shortness of breath although her energy level was down earlier in the cycle is back up to normal.  She only missed about 3 days of work.    She developed a palpable abnormality in her left breast in January 2017 which she noted on self-examination.  A diagnostic mammogram on January 19 showed a greater than 1 cm nodule in the upper outer portion of left breast.  By ultrasound this was lobulated and hypoechoic measuring 1.75 x 1.51 x 1.96 cm.    On January 24, 2017 a core needle biopsy was performed which showed infiltrating ductal carcinoma, high grade.  The tumor was ER negative, ME negative, and HER-2 negative.  A follow-up ultrasound on December 6 showed 2.5 x 2.2 x 1.5 cm left breast mass.  There was no abnormality noted in the left axilla.     I recommended that she have a sentinel lymph node biopsy in order to help determine how much preoperative therapy would be indicated.  She underwent sentinel lymph node biopsy on February 22.  That showed 4 negative lymph nodes.    Review of Systems   Constitutional: Positive for appetite change (taste is off). Negative for activity change, fatigue, fever and unexpected weight change.   Respiratory: Negative for cough and shortness of breath.    Cardiovascular: Negative for chest pain.   Gastrointestinal: Negative for abdominal pain and constipation. Diarrhea: bowels were loose.    Musculoskeletal: Negative for back pain and neck pain.   Neurological: Negative for headaches.   Psychiatric/Behavioral: Negative for dysphoric mood. The patient is not nervous/anxious.        Objective:      Physical Exam   Constitutional: She is oriented to person, place, and time. She appears well-developed and well-nourished.   HENT:   Head: Normocephalic and atraumatic.   Mouth/Throat: Oropharynx is clear and moist. No oropharyngeal exudate.   Eyes: No scleral icterus.   Cardiovascular: Normal rate, regular rhythm and normal heart sounds.    Pulmonary/Chest: Effort normal and breath sounds normal. She has no wheezes. She has no rales. Right breast exhibits no mass, no nipple discharge and no skin change. Left breast exhibits mass and skin change.       Abdominal: Soft. She exhibits no mass. There is no tenderness.   Lymphadenopathy:     She has no cervical adenopathy.     She has no axillary adenopathy.        Right: No supraclavicular adenopathy present.        Left: No supraclavicular adenopathy present.   Neurological: She is alert and oriented to person, place, and time.   Psychiatric: She has a normal mood and affect. Her behavior is normal. Thought content normal.       Assessment:       1. Breast cancer of upper-outer quadrant of left female breast    2. Encounter for antineoplastic chemotherapy        Plan:     cycle 2 of Taxotere and Cytoxan.

## 2017-03-31 ENCOUNTER — TELEPHONE (OUTPATIENT)
Dept: HEMATOLOGY/ONCOLOGY | Facility: CLINIC | Age: 57
End: 2017-03-31

## 2017-03-31 NOTE — TELEPHONE ENCOUNTER
----- Message from Monika Rees sent at 3/31/2017 12:20 PM CDT -----  Contact: Pt  Pt states she hasnt had a menstrual cycle since June 2016, she is on her 2nd round of chemo and today it appears that she has started a menstrual cycle, she would like to know if this is normal during the chemo process?    Pt contact number [phone]  Thanks

## 2017-04-18 ENCOUNTER — INFUSION (OUTPATIENT)
Dept: INFUSION THERAPY | Facility: HOSPITAL | Age: 57
End: 2017-04-18
Attending: INTERNAL MEDICINE
Payer: COMMERCIAL

## 2017-04-18 ENCOUNTER — LAB VISIT (OUTPATIENT)
Dept: LAB | Facility: HOSPITAL | Age: 57
End: 2017-04-18
Attending: INTERNAL MEDICINE
Payer: COMMERCIAL

## 2017-04-18 ENCOUNTER — OFFICE VISIT (OUTPATIENT)
Dept: HEMATOLOGY/ONCOLOGY | Facility: CLINIC | Age: 57
End: 2017-04-18
Payer: COMMERCIAL

## 2017-04-18 VITALS
RESPIRATION RATE: 15 BRPM | SYSTOLIC BLOOD PRESSURE: 135 MMHG | DIASTOLIC BLOOD PRESSURE: 73 MMHG | WEIGHT: 209.88 LBS | HEART RATE: 89 BPM | TEMPERATURE: 99 F | BODY MASS INDEX: 34.97 KG/M2 | HEIGHT: 65 IN

## 2017-04-18 VITALS
RESPIRATION RATE: 16 BRPM | DIASTOLIC BLOOD PRESSURE: 63 MMHG | HEART RATE: 85 BPM | TEMPERATURE: 98 F | SYSTOLIC BLOOD PRESSURE: 117 MMHG

## 2017-04-18 DIAGNOSIS — C50.412 BREAST CANCER OF UPPER-OUTER QUADRANT OF LEFT FEMALE BREAST: Primary | ICD-10-CM

## 2017-04-18 DIAGNOSIS — Z51.11 ENCOUNTER FOR ANTINEOPLASTIC CHEMOTHERAPY: Primary | ICD-10-CM

## 2017-04-18 DIAGNOSIS — C50.412 BREAST CANCER OF UPPER-OUTER QUADRANT OF LEFT FEMALE BREAST: ICD-10-CM

## 2017-04-18 DIAGNOSIS — Z51.11 ENCOUNTER FOR ANTINEOPLASTIC CHEMOTHERAPY: ICD-10-CM

## 2017-04-18 LAB
ALBUMIN SERPL BCP-MCNC: 3.6 G/DL
ALP SERPL-CCNC: 102 U/L
ALT SERPL W/O P-5'-P-CCNC: 12 U/L
ANION GAP SERPL CALC-SCNC: 12 MMOL/L
AST SERPL-CCNC: 18 U/L
BASOPHILS # BLD AUTO: 0.01 K/UL
BASOPHILS NFR BLD: 0.1 %
BILIRUB SERPL-MCNC: 0.2 MG/DL
BUN SERPL-MCNC: 11 MG/DL
CALCIUM SERPL-MCNC: 9.7 MG/DL
CHLORIDE SERPL-SCNC: 107 MMOL/L
CO2 SERPL-SCNC: 21 MMOL/L
CREAT SERPL-MCNC: 0.8 MG/DL
DIFFERENTIAL METHOD: ABNORMAL
EOSINOPHIL # BLD AUTO: 0 K/UL
EOSINOPHIL NFR BLD: 0 %
ERYTHROCYTE [DISTWIDTH] IN BLOOD BY AUTOMATED COUNT: 16.3 %
EST. GFR  (AFRICAN AMERICAN): >60 ML/MIN/1.73 M^2
EST. GFR  (NON AFRICAN AMERICAN): >60 ML/MIN/1.73 M^2
GLUCOSE SERPL-MCNC: 101 MG/DL
HCT VFR BLD AUTO: 36.6 %
HGB BLD-MCNC: 11.7 G/DL
LYMPHOCYTES # BLD AUTO: 1.2 K/UL
LYMPHOCYTES NFR BLD: 10.5 %
MCH RBC QN AUTO: 26.7 PG
MCHC RBC AUTO-ENTMCNC: 32 %
MCV RBC AUTO: 84 FL
MONOCYTES # BLD AUTO: 0.9 K/UL
MONOCYTES NFR BLD: 8.3 %
NEUTROPHILS # BLD AUTO: 9 K/UL
NEUTROPHILS NFR BLD: 81 %
PLATELET # BLD AUTO: 363 K/UL
PMV BLD AUTO: 9.2 FL
POTASSIUM SERPL-SCNC: 4.1 MMOL/L
PROT SERPL-MCNC: 7.9 G/DL
RBC # BLD AUTO: 4.38 M/UL
SODIUM SERPL-SCNC: 140 MMOL/L
WBC # BLD AUTO: 11.15 K/UL

## 2017-04-18 PROCEDURE — 3078F DIAST BP <80 MM HG: CPT | Mod: S$GLB,,, | Performed by: PHYSICIAN ASSISTANT

## 2017-04-18 PROCEDURE — 25000003 PHARM REV CODE 250: Performed by: INTERNAL MEDICINE

## 2017-04-18 PROCEDURE — 96377 APPLICATON ON-BODY INJECTOR: CPT

## 2017-04-18 PROCEDURE — 96413 CHEMO IV INFUSION 1 HR: CPT

## 2017-04-18 PROCEDURE — 96367 TX/PROPH/DG ADDL SEQ IV INF: CPT

## 2017-04-18 PROCEDURE — 99999 PR PBB SHADOW E&M-EST. PATIENT-LVL III: CPT | Mod: PBBFAC,,, | Performed by: PHYSICIAN ASSISTANT

## 2017-04-18 PROCEDURE — 85025 COMPLETE CBC W/AUTO DIFF WBC: CPT

## 2017-04-18 PROCEDURE — 3075F SYST BP GE 130 - 139MM HG: CPT | Mod: S$GLB,,, | Performed by: PHYSICIAN ASSISTANT

## 2017-04-18 PROCEDURE — 63600175 PHARM REV CODE 636 W HCPCS: Performed by: INTERNAL MEDICINE

## 2017-04-18 PROCEDURE — 96417 CHEMO IV INFUS EACH ADDL SEQ: CPT

## 2017-04-18 PROCEDURE — 80053 COMPREHEN METABOLIC PANEL: CPT

## 2017-04-18 PROCEDURE — 36415 COLL VENOUS BLD VENIPUNCTURE: CPT

## 2017-04-18 PROCEDURE — 99214 OFFICE O/P EST MOD 30 MIN: CPT | Mod: S$GLB,,, | Performed by: PHYSICIAN ASSISTANT

## 2017-04-18 RX ORDER — HEPARIN 100 UNIT/ML
500 SYRINGE INTRAVENOUS
Status: DISCONTINUED | OUTPATIENT
Start: 2017-04-18 | End: 2017-04-18 | Stop reason: HOSPADM

## 2017-04-18 RX ORDER — SODIUM CHLORIDE 0.9 % (FLUSH) 0.9 %
10 SYRINGE (ML) INJECTION
Status: DISCONTINUED | OUTPATIENT
Start: 2017-04-18 | End: 2017-04-18 | Stop reason: HOSPADM

## 2017-04-18 RX ADMIN — PEGFILGRASTIM 6 MG: KIT SUBCUTANEOUS at 03:04

## 2017-04-18 RX ADMIN — HEPARIN 500 UNITS: 100 SYRINGE at 03:04

## 2017-04-18 RX ADMIN — SODIUM CHLORIDE: 0.9 INJECTION, SOLUTION INTRAVENOUS at 11:04

## 2017-04-18 RX ADMIN — DOCETAXEL 155 MG: 10 INJECTION, SOLUTION INTRAVENOUS at 12:04

## 2017-04-18 RX ADMIN — DEXAMETHASONE SODIUM PHOSPHATE 0.25 MG: 4 INJECTION, SOLUTION INTRAMUSCULAR; INTRAVENOUS at 11:04

## 2017-04-18 RX ADMIN — SODIUM CHLORIDE 150 MG: 9 INJECTION, SOLUTION INTRAVENOUS at 12:04

## 2017-04-18 RX ADMIN — SODIUM CHLORIDE, PRESERVATIVE FREE 10 ML: 5 INJECTION INTRAVENOUS at 03:04

## 2017-04-18 RX ADMIN — CYCLOPHOSPHAMIDE 1255 MG: 1 INJECTION, POWDER, FOR SOLUTION INTRAVENOUS; ORAL at 02:04

## 2017-04-18 NOTE — PROGRESS NOTES
Subjective:       Patient ID: Ermelinda Verde is a 56 y.o. female.    Chief Complaint: No chief complaint on file.    HPI Comments: Dr. Reyna's patient    56 year old female with left breast cancer here for neoadjuvant TC #3.    With some vaginal bleeding/spotting after annual examthat lasted 4-5 days   (Gyn is Dr. Arabella Shepherd in [place].)   D&C last June.   No fever, chills, nausea or vomiting. Notes some heaviness in her legs as chemotherapy continues.   Notes some thick white coating on tongue several days after chemotherapy for which she is using mouth rinses.  Appetite and energy level are fair but overall tolerating treatment well.          Onc History:   She developed a palpable abnormality in her left breast in January 2017 which she noted on self-examination.  A diagnostic mammogram on January 19 showed a greater than 1 cm nodule in the upper outer portion of left breast. By ultrasound this was lobulated and hypoechoic measuring 1.75 x 1.51 x 1.96 cm.     On January 24, 2017 a core needle biopsy was performed which showed infiltrating ductal carcinoma, high grade. The tumor was ER negative, AR negative, and HER-2 negative.  A follow-up ultrasound on December 6 showed 2.5 x 2.2 x 1.5 cm left breast mass. There was no abnormality noted in the left axilla.     I recommended that she have a sentinel lymph node biopsy in order to help determine how much preoperative therapy would be indicated.  She underwent sentinel lymph node biopsy on February 22. That showed 4 negative lymph nodes.    Review of Systems   Constitutional: Positive for fatigue. Negative for activity change, appetite change, chills, fever and unexpected weight change.   HENT: Positive for mouth sores and postnasal drip. Negative for congestion and sore throat.    Respiratory: Positive for cough (from post nasal drip, comes and goes). Negative for chest tightness and shortness of breath.    Cardiovascular: Negative for chest pain, palpitations  and leg swelling.   Gastrointestinal: Negative for abdominal distention, abdominal pain, constipation, diarrhea, nausea and vomiting.   Genitourinary: Positive for vaginal bleeding (recent spotting after well woman exam, has since resolved). Negative for dysuria and hematuria.   Musculoskeletal: Positive for myalgias (lower extermity ). Negative for arthralgias and back pain.   Skin: Negative for pallor and rash.   Neurological: Negative for dizziness, weakness and headaches.   Hematological: Negative for adenopathy. Does not bruise/bleed easily.   Psychiatric/Behavioral: Negative for dysphoric mood. The patient is not nervous/anxious.        Objective:      Physical Exam   Constitutional: She is oriented to person, place, and time. She appears well-developed and well-nourished. No distress.   ECOG 0  Presents with  and daughter   HENT:   Head: Normocephalic.   Mouth/Throat: Oropharynx is clear and moist. No oropharyngeal exudate.   Eyes: Conjunctivae are normal. Pupils are equal, round, and reactive to light. No scleral icterus.   Neck: Normal range of motion. Neck supple. No thyromegaly present.   Cardiovascular: Normal rate and regular rhythm.    Pulmonary/Chest: Effort normal and breath sounds normal. No respiratory distress.   Right breast without mass, nodule or skin changes. Left breast with ill defined firmness, approximately 1.5 cm at 2 o'clock, no discrete borders. Left nipple with some residual scale but no erythema. No axillary or supraclavicular adenopathy.    Abdominal: Soft. Bowel sounds are normal. She exhibits no distension and no mass. There is no tenderness.   Musculoskeletal: Normal range of motion. She exhibits no edema or tenderness.   No spinal or paraspinal tenderness to palpation     Lymphadenopathy:     She has no cervical adenopathy.   Neurological: She is alert and oriented to person, place, and time. No cranial nerve deficit.   Skin: Skin is warm and dry.   Psychiatric: She has a  normal mood and affect. Her behavior is normal. Thought content normal.   Vitals reviewed.      Assessment:       1. Breast cancer of upper-outer quadrant of left female breast    2. Encounter for antineoplastic chemotherapy        Plan:       Proceed with TC #3 today. Patient will go home with neulasta onpro.  She will return in 3 weeks with labs prior to 4th and final cycle of demi-adjuvant chemotherapy.  All questions answered to satisfaction of patient and her family.

## 2017-04-18 NOTE — PLAN OF CARE
Problem: Patient Care Overview  Goal: Individualization & Mutuality  Outcome: Ongoing (interventions implemented as appropriate)  1137-Labs , hx, and medications reviewed. Assessment completed. Discussed plan of care with patient. Patient in agreement. Chair reclined and warm blanket and snack offered.

## 2017-04-18 NOTE — MR AVS SNAPSHOT
"Patient Information     Patient Name Sex Ermelinda Wilson Female 1960      Visit Information        Provider Department [facility]    2017 11:30 AM [facility], CHEMO [facility] [phone] Adolfo Moreira      Patient Instructions     None      Your Current Medications Are     amlodipine-benazepril (LOTREL) 10-40 mg per capsule    CALCIUM CARBONATE (CALCIUM 500 ORAL)    cyanocobalamin (VITAMIN B-12) 500 MCG tablet    dexamethasone (DECADRON) 4 MG Tab    ferrous gluconate (FERGON) 324 MG tablet    hydrochlorothiazide (HYDRODIURIL) 25 MG tablet    hydrocodone-acetaminophen 5-325mg (NORCO) 5-325 mg per tablet    lidocaine-prilocaine (EMLA) cream    metoprolol succinate (TOPROL-XL) 50 MG 24 hr tablet    potassium chloride SA (K-DUR,KLOR-CON) 20 MEQ tablet      Facility-Administered Medications     cyclophosphamide (CYTOXAN) 600 mg/m2 = 1,255 mg in sodium chloride 0.9% 250 mL chemo infusion    docetaxel (TAXOTERE) 75 mg/m2 = 155 mg in sodium chloride 0.9% 250 mL chemo infusion    fosaprepitant 150 mg in sodium chloride 0.9% 150 mL IVPB    heparin, porcine (PF) 100 unit/mL injection flush 500 Units    palonosetron 0.25mg/dexamethasone 10mg IVPB    pegfilgrastim (NEULASTA (ON BODY INJECTOR)) injection 6 mg    sodium chloride 0.9% 250 mL flush bag    sodium chloride 0.9% flush 10 mL      Appointments for Next Year     None         Default Flowsheet Data (last 24 hours)      Amb Complex Vitals Derrell        17 1404 17 1400 17 1314 17 1251 17 1135    Measurements    /62   end taxotere  132/66   docetaxil 15 minutes 118/69   start taxotere 119/68    Temp     98.2 °F (36.8 °C)    Pulse 79  82 82 80    Resp   16  16    Pain Assessment    Pain Score     Zero       17 1046                Measurements    Weight 95.2 kg (209 lb 14.1 oz)        Height 5' 5" (1.651 m)        BSA (Calculated - sq m) 2.09 sq meters        BMI (Calculated) 35        /73        " Temp 98.7 °F (37.1 °C)        Pulse 89        Resp 15        Pain Assessment    Pain Score Zero                Allergies     No Known Allergies      Medications You Received from 04/17/2017 1448 to 04/18/2017 1448        Date/Time Order Dose Route Action     04/18/2017 1407 cyclophosphamide (CYTOXAN) 600 mg/m2 = 1,255 mg in sodium chloride 0.9% 250 mL chemo infusion 1,255 mg Intravenous New Bag     04/18/2017 1254 docetaxel (TAXOTERE) 75 mg/m2 = 155 mg in sodium chloride 0.9% 250 mL chemo infusion 155 mg Intravenous New Bag     04/18/2017 1204 fosaprepitant 150 mg in sodium chloride 0.9% 150 mL IVPB 150 mg Intravenous New Bag     04/18/2017 1146 palonosetron 0.25mg/dexamethasone 10mg IVPB 0.25 mg Intravenous New Bag     04/18/2017 1146 sodium chloride 0.9% 250 mL flush bag   Intravenous New Bag      Current Discharge Medication List     Cannot display discharge medications since this is not an admission.

## 2017-04-18 NOTE — PLAN OF CARE
Problem: Patient Care Overview  Goal: Discharge Needs Assessment  Outcome: Ongoing (interventions implemented as appropriate)  1513-Patient tolerated treatment well. Discharged without complaints or S/S of adverse event. AVS given.  Instructed to call provider for any questions or concerns.

## 2017-05-04 ENCOUNTER — TELEPHONE (OUTPATIENT)
Dept: HEMATOLOGY/ONCOLOGY | Facility: CLINIC | Age: 57
End: 2017-05-04

## 2017-05-04 NOTE — TELEPHONE ENCOUNTER
Call patient and she wanted a later appointment time on May 9th and I could not accommodate her. I offered a later time with Tri Song, but she said she will keep the appointment as scheduled.

## 2017-05-04 NOTE — TELEPHONE ENCOUNTER
----- Message from Matthew Sanders sent at 5/4/2017  9:27 AM CDT -----  Contact: PT  Would like a later time for appt 5-9-17, she would like an hour later, call: [phone]

## 2017-05-09 ENCOUNTER — LAB VISIT (OUTPATIENT)
Dept: LAB | Facility: HOSPITAL | Age: 57
End: 2017-05-09
Attending: INTERNAL MEDICINE
Payer: COMMERCIAL

## 2017-05-09 ENCOUNTER — INFUSION (OUTPATIENT)
Dept: INFUSION THERAPY | Facility: HOSPITAL | Age: 57
End: 2017-05-09
Attending: INTERNAL MEDICINE
Payer: COMMERCIAL

## 2017-05-09 ENCOUNTER — OFFICE VISIT (OUTPATIENT)
Dept: HEMATOLOGY/ONCOLOGY | Facility: CLINIC | Age: 57
End: 2017-05-09
Payer: COMMERCIAL

## 2017-05-09 VITALS
BODY MASS INDEX: 34.49 KG/M2 | RESPIRATION RATE: 16 BRPM | HEART RATE: 85 BPM | WEIGHT: 207 LBS | SYSTOLIC BLOOD PRESSURE: 125 MMHG | HEIGHT: 65 IN | TEMPERATURE: 98 F | DIASTOLIC BLOOD PRESSURE: 66 MMHG

## 2017-05-09 VITALS
DIASTOLIC BLOOD PRESSURE: 82 MMHG | RESPIRATION RATE: 16 BRPM | SYSTOLIC BLOOD PRESSURE: 150 MMHG | HEART RATE: 106 BPM | WEIGHT: 207.88 LBS | TEMPERATURE: 98 F | BODY MASS INDEX: 34.6 KG/M2

## 2017-05-09 DIAGNOSIS — Z51.11 ENCOUNTER FOR ANTINEOPLASTIC CHEMOTHERAPY: Primary | ICD-10-CM

## 2017-05-09 DIAGNOSIS — C50.412 BREAST CANCER OF UPPER-OUTER QUADRANT OF LEFT FEMALE BREAST: ICD-10-CM

## 2017-05-09 DIAGNOSIS — Z51.11 ENCOUNTER FOR ANTINEOPLASTIC CHEMOTHERAPY: ICD-10-CM

## 2017-05-09 DIAGNOSIS — C50.412 BREAST CANCER OF UPPER-OUTER QUADRANT OF LEFT FEMALE BREAST: Primary | ICD-10-CM

## 2017-05-09 LAB
ALBUMIN SERPL BCP-MCNC: 3.6 G/DL
ALP SERPL-CCNC: 96 U/L
ALT SERPL W/O P-5'-P-CCNC: 12 U/L
ANION GAP SERPL CALC-SCNC: 12 MMOL/L
AST SERPL-CCNC: 16 U/L
BILIRUB SERPL-MCNC: 0.3 MG/DL
BUN SERPL-MCNC: 10 MG/DL
CALCIUM SERPL-MCNC: 10 MG/DL
CHLORIDE SERPL-SCNC: 107 MMOL/L
CO2 SERPL-SCNC: 20 MMOL/L
CREAT SERPL-MCNC: 0.9 MG/DL
ERYTHROCYTE [DISTWIDTH] IN BLOOD BY AUTOMATED COUNT: 17.5 %
EST. GFR  (AFRICAN AMERICAN): >60 ML/MIN/1.73 M^2
EST. GFR  (NON AFRICAN AMERICAN): >60 ML/MIN/1.73 M^2
GLUCOSE SERPL-MCNC: 142 MG/DL
HCT VFR BLD AUTO: 33.9 %
HGB BLD-MCNC: 11.2 G/DL
MCH RBC QN AUTO: 27.2 PG
MCHC RBC AUTO-ENTMCNC: 33 %
MCV RBC AUTO: 82 FL
NEUTROPHILS # BLD AUTO: 6.6 K/UL
PLATELET # BLD AUTO: 304 K/UL
PMV BLD AUTO: 8.7 FL
POTASSIUM SERPL-SCNC: 3.8 MMOL/L
PROT SERPL-MCNC: 7.7 G/DL
RBC # BLD AUTO: 4.12 M/UL
SODIUM SERPL-SCNC: 139 MMOL/L
WBC # BLD AUTO: 8.6 K/UL

## 2017-05-09 PROCEDURE — 96367 TX/PROPH/DG ADDL SEQ IV INF: CPT

## 2017-05-09 PROCEDURE — 80053 COMPREHEN METABOLIC PANEL: CPT

## 2017-05-09 PROCEDURE — 3077F SYST BP >= 140 MM HG: CPT | Mod: S$GLB,,, | Performed by: INTERNAL MEDICINE

## 2017-05-09 PROCEDURE — 99999 PR PBB SHADOW E&M-EST. PATIENT-LVL III: CPT | Mod: PBBFAC,,, | Performed by: INTERNAL MEDICINE

## 2017-05-09 PROCEDURE — 99214 OFFICE O/P EST MOD 30 MIN: CPT | Mod: S$GLB,,, | Performed by: INTERNAL MEDICINE

## 2017-05-09 PROCEDURE — 36415 COLL VENOUS BLD VENIPUNCTURE: CPT

## 2017-05-09 PROCEDURE — 3079F DIAST BP 80-89 MM HG: CPT | Mod: S$GLB,,, | Performed by: INTERNAL MEDICINE

## 2017-05-09 PROCEDURE — 1160F RVW MEDS BY RX/DR IN RCRD: CPT | Mod: S$GLB,,, | Performed by: INTERNAL MEDICINE

## 2017-05-09 PROCEDURE — 63600175 PHARM REV CODE 636 W HCPCS: Performed by: INTERNAL MEDICINE

## 2017-05-09 PROCEDURE — 96377 APPLICATON ON-BODY INJECTOR: CPT

## 2017-05-09 PROCEDURE — 25000003 PHARM REV CODE 250: Performed by: INTERNAL MEDICINE

## 2017-05-09 PROCEDURE — 96417 CHEMO IV INFUS EACH ADDL SEQ: CPT

## 2017-05-09 PROCEDURE — 85027 COMPLETE CBC AUTOMATED: CPT

## 2017-05-09 PROCEDURE — 96413 CHEMO IV INFUSION 1 HR: CPT

## 2017-05-09 RX ORDER — HEPARIN 100 UNIT/ML
500 SYRINGE INTRAVENOUS
Status: CANCELLED | OUTPATIENT
Start: 2017-05-09

## 2017-05-09 RX ORDER — HYDROCODONE BITARTRATE AND ACETAMINOPHEN 7.5; 325 MG/1; MG/1
TABLET ORAL
COMMUNITY
Start: 2017-02-22 | End: 2017-05-09

## 2017-05-09 RX ORDER — SODIUM CHLORIDE 0.9 % (FLUSH) 0.9 %
10 SYRINGE (ML) INJECTION
Status: DISCONTINUED | OUTPATIENT
Start: 2017-05-09 | End: 2017-05-09 | Stop reason: HOSPADM

## 2017-05-09 RX ORDER — HEPARIN 100 UNIT/ML
500 SYRINGE INTRAVENOUS
Status: DISCONTINUED | OUTPATIENT
Start: 2017-05-09 | End: 2017-05-09 | Stop reason: HOSPADM

## 2017-05-09 RX ORDER — SODIUM CHLORIDE 0.9 % (FLUSH) 0.9 %
10 SYRINGE (ML) INJECTION
Status: CANCELLED | OUTPATIENT
Start: 2017-05-09

## 2017-05-09 RX ADMIN — PEGFILGRASTIM 6 MG: KIT SUBCUTANEOUS at 01:05

## 2017-05-09 RX ADMIN — SODIUM CHLORIDE, PRESERVATIVE FREE 10 ML: 5 INJECTION INTRAVENOUS at 01:05

## 2017-05-09 RX ADMIN — SODIUM CHLORIDE: 0.9 INJECTION, SOLUTION INTRAVENOUS at 10:05

## 2017-05-09 RX ADMIN — DEXAMETHASONE SODIUM PHOSPHATE 0.25 MG: 4 INJECTION, SOLUTION INTRAMUSCULAR; INTRAVENOUS at 10:05

## 2017-05-09 RX ADMIN — DOCETAXEL 155 MG: 10 INJECTION, SOLUTION INTRAVENOUS at 11:05

## 2017-05-09 RX ADMIN — HEPARIN 500 UNITS: 100 SYRINGE at 01:05

## 2017-05-09 RX ADMIN — SODIUM CHLORIDE 150 MG: 9 INJECTION, SOLUTION INTRAVENOUS at 10:05

## 2017-05-09 RX ADMIN — CYCLOPHOSPHAMIDE 1250 MG: 1 INJECTION, POWDER, FOR SOLUTION INTRAVENOUS; ORAL at 12:05

## 2017-05-09 NOTE — MR AVS SNAPSHOT
Patient Information     Patient Name Sex Ermelinda Wilson Female 1960      Visit Information        [facility]    2017 9:30 AM [facility], [facility] [phone] Adolfo Moreira      Patient Instructions     None      Your Current Medications Are     amlodipine-benazepril (LOTREL) 10-40 mg per capsule    CALCIUM CARBONATE (CALCIUM 500 ORAL)    cyanocobalamin (VITAMIN B-12) 500 MCG tablet    dexamethasone (DECADRON) 4 MG Tab    ferrous gluconate (FERGON) 324 MG tablet    hydrochlorothiazide (HYDRODIURIL) 25 MG tablet    lidocaine-prilocaine (EMLA) cream    metoprolol succinate (TOPROL-XL) 50 MG 24 hr tablet    potassium chloride SA (K-DUR,KLOR-CON) 20 MEQ tablet    hydrocodone-acetaminophen 5-325mg (NORCO) 5-325 mg per tablet (Discontinued)    hydrocodone-acetaminophen 7.5-325mg (NORCO) 7.5-325 mg per tablet (Discontinued)      Facility-Administered Medications     cyclophosphamide (CYTOXAN) 600 mg/m2 = 1,250 mg in sodium chloride 0.9% 250 mL chemo infusion    docetaxel (TAXOTERE) 75 mg/m2 = 155 mg in sodium chloride 0.9% 250 mL chemo infusion    fosaprepitant 150 mg in sodium chloride 0.9% 150 mL IVPB    heparin, porcine (PF) 100 unit/mL injection flush 500 Units    palonosetron 0.25mg/dexamethasone 10mg IVPB    pegfilgrastim (NEULASTA (ON BODY INJECTOR)) injection 6 mg    sodium chloride 0.9% 250 mL flush bag    sodium chloride 0.9% flush 10 mL      Appointments for Next Year     2017  9:30 AM NON FASTING LAB (10 min.) [facility]    Arrive at check-in approximately 15 minutes before your scheduled appointment time. Bring all outside medical records and imaging, along with a list of your current medications and insurance card.    [facility] 3rd Floor    2017 10:30 AM ESTABLISHED PATIENT (30 min.) [place] - Hematology Oncology Tri Song PA-C    Arrive at check-in approximately 15 minutes  "before your scheduled appointment time. Bring all outside medical records and imaging, along with a list of your current medications and insurance card.    [facility] - 3rd Floor         Default Flowsheet Data (last 24 hours)      Amb Complex Vitals [facility]        05/09/17 1349 05/09/17 1239 05/09/17 1151 05/09/17 1135 05/09/17 1020    Measurements    Weight     93.9 kg (207 lb)    Height     5' 5" (1.651 m)    BSA (Calculated - sq m)     2.07 sq meters    BMI (Calculated)     34.5    /66   end v/s 125/74   end taxotere 106/61   15 min taxotere 108/60   start taxotere 119/66    Temp     98.4 °F (36.9 °C)    Pulse 85 88 95 87 89    Resp     16    Pain Assessment    Pain Score     Zero       05/09/17 0855                Measurements    Weight 94.3 kg (207 lb 14.3 oz)        BP (!)  150/82        Temp 98.2 °F (36.8 °C)        Pulse 106        Resp 16        Pain Assessment    Pain Score Zero                Allergies     No Known Allergies      Medications You Received from 05/08/2017 1403 to 05/09/2017 1403        Date/Time Order Dose Route Action     05/09/2017 1249 cyclophosphamide (CYTOXAN) 600 mg/m2 = 1,250 mg in sodium chloride 0.9% 250 mL chemo infusion 1,250 mg Intravenous New Bag     05/09/2017 1136 docetaxel (TAXOTERE) 75 mg/m2 = 155 mg in sodium chloride 0.9% 250 mL chemo infusion 155 mg Intravenous New Bag     05/09/2017 1030 fosaprepitant 150 mg in sodium chloride 0.9% 150 mL IVPB 150 mg Intravenous New Bag     05/09/2017 1357 heparin, porcine (PF) 100 unit/mL injection flush 500 Units 500 Units Intravenous Given     05/09/2017 1050 palonosetron 0.25mg/dexamethasone 10mg IVPB 0.25 mg Intravenous New Bag     05/09/2017 1355 pegfilgrastim (NEULASTA (ON BODY INJECTOR)) injection 6 mg 6 mg Subcutaneous Given     05/09/2017 1028 sodium chloride 0.9% 250 mL flush bag   Intravenous New Bag     05/09/2017 1355 sodium chloride 0.9% flush 10 mL 10 mL Intravenous Given      Current Discharge Medication " List     Cannot display discharge medications since this is not an admission.

## 2017-05-09 NOTE — PLAN OF CARE
Problem: Patient Care Overview  Goal: Discharge Needs Assessment  Outcome: Ongoing (interventions implemented as appropriate)  1357-Patient tolerated treatment well. Discharged without complaints or S/S of adverse event. AVS given.  Instructed to call provider for any questions or concerns.

## 2017-05-09 NOTE — PROGRESS NOTES
Answers for HPI/ROS submitted by the patient on 5/3/2017   appetite change : No  unexpected weight change: No  visual disturbance: No  cough: Yes  shortness of breath: No  chest pain: No  abdominal pain: No  diarrhea: No  frequency: No  back pain: No  rash: No  headaches: No  adenopathy: No  nervous/ anxious: Yes  Subjective:       Patient ID: Ermelinda Verde is a 56 y.o. female.    Chief Complaint: Breast Cancer    HPI Mrs Verde is  a very pleasant 56-year-old -American female referred by  for a diagnosis of left breast cancer.  She is also a patient of .  She presents to continue neoadjuvant chemotherapy.  She is status post 3 cycles of Taxotere and Cytoxan.    Her major current complaint is some fatigue. She feels that all over especially in her joints.  She has some dyspnea on exertion.  Appetites been good.  She's had no diarrhea.      She developed a palpable abnormality in her left breast in January 2017 which she noted on self-examination.  A diagnostic mammogram on January 19 showed a greater than 1 cm nodule in the upper outer portion of left breast.  By ultrasound this was lobulated and hypoechoic measuring 1.75 x 1.51 x 1.96 cm.    On January 24, 2017 a core needle biopsy was performed which showed infiltrating ductal carcinoma, high grade.  The tumor was ER negative, RI negative, and HER-2 negative.  A follow-up ultrasound on December 6 showed 2.5 x 2.2 x 1.5 cm left breast mass.  There was no abnormality noted in the left axilla.     I recommended that she have a sentinel lymph node biopsy in order to help determine how much preoperative therapy would be indicated.  She underwent sentinel lymph node biopsy on February 22.  That showed 4 negative lymph nodes.    Review of Systems   Constitutional: Positive for appetite change (taste is off). Negative for activity change, fatigue, fever and unexpected weight change.   Respiratory: Negative for cough and shortness of breath.     Cardiovascular: Negative for chest pain.   Gastrointestinal: Negative for abdominal pain and constipation. Diarrhea: bowels were loose.   Musculoskeletal: Negative for back pain and neck pain.   Neurological: Negative for headaches.   Psychiatric/Behavioral: Negative for dysphoric mood. The patient is not nervous/anxious.        Objective:      Physical Exam   Constitutional: She is oriented to person, place, and time. She appears well-developed and well-nourished.   HENT:   Head: Normocephalic and atraumatic.   Mouth/Throat: Oropharynx is clear and moist. No oropharyngeal exudate.   Eyes: No scleral icterus.   Cardiovascular: Normal rate, regular rhythm and normal heart sounds.    Pulmonary/Chest: Effort normal and breath sounds normal. She has no wheezes. She has no rales. Right breast exhibits no mass, no nipple discharge and no skin change. Left breast exhibits no mass, no nipple discharge and no skin change.       Abdominal: Soft. She exhibits no mass. There is no tenderness.   Lymphadenopathy:     She has no cervical adenopathy.     She has no axillary adenopathy.        Right: No supraclavicular adenopathy present.        Left: No supraclavicular adenopathy present.   Neurological: She is alert and oriented to person, place, and time.   Psychiatric: She has a normal mood and affect. Her behavior is normal. Thought content normal.       Assessment:       1. Breast cancer of upper-outer quadrant of left female breast    2. Encounter for antineoplastic chemotherapy        Plan:     Proceed with cycle 4 of Taxotere and Cytoxan.F/U with surgery.  RTC 3 weeks.

## 2017-05-09 NOTE — PLAN OF CARE
Problem: Patient Care Overview  Goal: Individualization & Mutuality  Outcome: Ongoing (interventions implemented as appropriate)  1025-Labs , hx, and medications reviewed. Assessment completed. Discussed plan of care with patient. Patient in agreement. Chair reclined and warm blanket and snack offered.

## 2017-05-22 ENCOUNTER — NURSE TRIAGE (OUTPATIENT)
Dept: ADMINISTRATIVE | Facility: CLINIC | Age: 57
End: 2017-05-22

## 2017-05-23 ENCOUNTER — TELEPHONE (OUTPATIENT)
Dept: HEMATOLOGY/ONCOLOGY | Facility: CLINIC | Age: 57
End: 2017-05-23

## 2017-05-23 NOTE — TELEPHONE ENCOUNTER
Reason for Disposition   [1] Nipple discharge AND [2] bloody    Protocols used: ST BREAST SYMPTOMS-A-AH    Pt calling in regards to seeing bloody discharge coming from nipple.  Pt states at first she saw a brownish discharge but pressed on the breast and the discharge became bloody.  Care Advise given.  Will message MD.

## 2017-05-23 NOTE — TELEPHONE ENCOUNTER
----- Message from Kinsey Craft sent at 5/23/2017  9:30 AM CDT -----  Contact: Self   Pt is returning Allisons call. Please call pt back at [phone]

## 2017-05-30 ENCOUNTER — OFFICE VISIT (OUTPATIENT)
Dept: HEMATOLOGY/ONCOLOGY | Facility: CLINIC | Age: 57
End: 2017-05-30
Payer: COMMERCIAL

## 2017-05-30 ENCOUNTER — OFFICE VISIT (OUTPATIENT)
Dept: SURGERY | Facility: CLINIC | Age: 57
End: 2017-05-30
Payer: COMMERCIAL

## 2017-05-30 ENCOUNTER — LAB VISIT (OUTPATIENT)
Dept: LAB | Facility: HOSPITAL | Age: 57
End: 2017-05-30
Payer: COMMERCIAL

## 2017-05-30 ENCOUNTER — TELEPHONE (OUTPATIENT)
Dept: SURGERY | Facility: CLINIC | Age: 57
End: 2017-05-30

## 2017-05-30 VITALS
HEIGHT: 65 IN | BODY MASS INDEX: 35.26 KG/M2 | WEIGHT: 211.63 LBS | TEMPERATURE: 98 F | HEART RATE: 88 BPM | DIASTOLIC BLOOD PRESSURE: 76 MMHG | RESPIRATION RATE: 12 BRPM | SYSTOLIC BLOOD PRESSURE: 124 MMHG

## 2017-05-30 VITALS
BODY MASS INDEX: 34.78 KG/M2 | WEIGHT: 209 LBS | HEART RATE: 89 BPM | SYSTOLIC BLOOD PRESSURE: 125 MMHG | DIASTOLIC BLOOD PRESSURE: 70 MMHG | TEMPERATURE: 98 F

## 2017-05-30 DIAGNOSIS — C50.412 BREAST CANCER OF UPPER-OUTER QUADRANT OF LEFT FEMALE BREAST: ICD-10-CM

## 2017-05-30 DIAGNOSIS — C50.412 MALIGNANT NEOPLASM OF UPPER-OUTER QUADRANT OF LEFT FEMALE BREAST: Primary | ICD-10-CM

## 2017-05-30 DIAGNOSIS — C50.412 BREAST CANCER OF UPPER-OUTER QUADRANT OF LEFT FEMALE BREAST: Primary | ICD-10-CM

## 2017-05-30 DIAGNOSIS — Z51.11 ENCOUNTER FOR ANTINEOPLASTIC CHEMOTHERAPY: ICD-10-CM

## 2017-05-30 LAB
ALBUMIN SERPL BCP-MCNC: 3.3 G/DL
ALP SERPL-CCNC: 83 U/L
ALT SERPL W/O P-5'-P-CCNC: 19 U/L
ANION GAP SERPL CALC-SCNC: 10 MMOL/L
AST SERPL-CCNC: 24 U/L
BASOPHILS # BLD AUTO: 0.07 K/UL
BASOPHILS NFR BLD: 1 %
BILIRUB SERPL-MCNC: 0.3 MG/DL
BUN SERPL-MCNC: 9 MG/DL
CALCIUM SERPL-MCNC: 9.2 MG/DL
CHLORIDE SERPL-SCNC: 108 MMOL/L
CO2 SERPL-SCNC: 23 MMOL/L
CREAT SERPL-MCNC: 0.8 MG/DL
DIFFERENTIAL METHOD: ABNORMAL
EOSINOPHIL # BLD AUTO: 0 K/UL
EOSINOPHIL NFR BLD: 0.1 %
ERYTHROCYTE [DISTWIDTH] IN BLOOD BY AUTOMATED COUNT: 18.5 %
EST. GFR  (AFRICAN AMERICAN): >60 ML/MIN/1.73 M^2
EST. GFR  (NON AFRICAN AMERICAN): >60 ML/MIN/1.73 M^2
GLUCOSE SERPL-MCNC: 88 MG/DL
HCT VFR BLD AUTO: 36 %
HGB BLD-MCNC: 11.4 G/DL
LYMPHOCYTES # BLD AUTO: 1.9 K/UL
LYMPHOCYTES NFR BLD: 28.3 %
MCH RBC QN AUTO: 27.5 PG
MCHC RBC AUTO-ENTMCNC: 31.7 %
MCV RBC AUTO: 87 FL
MONOCYTES # BLD AUTO: 0.4 K/UL
MONOCYTES NFR BLD: 6.5 %
NEUTROPHILS # BLD AUTO: 4.4 K/UL
NEUTROPHILS NFR BLD: 64.1 %
PLATELET # BLD AUTO: 245 K/UL
PMV BLD AUTO: 9 FL
POTASSIUM SERPL-SCNC: 3.9 MMOL/L
PROT SERPL-MCNC: 6.8 G/DL
RBC # BLD AUTO: 4.14 M/UL
SODIUM SERPL-SCNC: 141 MMOL/L
WBC # BLD AUTO: 6.79 K/UL

## 2017-05-30 PROCEDURE — 99999 PR PBB SHADOW E&M-EST. PATIENT-LVL III: CPT | Mod: PBBFAC,,, | Performed by: SURGERY

## 2017-05-30 PROCEDURE — 99999 PR PBB SHADOW E&M-EST. PATIENT-LVL III: CPT | Mod: PBBFAC,,, | Performed by: PHYSICIAN ASSISTANT

## 2017-05-30 PROCEDURE — 85025 COMPLETE CBC W/AUTO DIFF WBC: CPT

## 2017-05-30 PROCEDURE — 36415 COLL VENOUS BLD VENIPUNCTURE: CPT

## 2017-05-30 PROCEDURE — 99214 OFFICE O/P EST MOD 30 MIN: CPT | Mod: S$GLB,,, | Performed by: SURGERY

## 2017-05-30 PROCEDURE — 80053 COMPREHEN METABOLIC PANEL: CPT

## 2017-05-30 PROCEDURE — 99213 OFFICE O/P EST LOW 20 MIN: CPT | Mod: S$GLB,,, | Performed by: PHYSICIAN ASSISTANT

## 2017-05-30 RX ORDER — DIAZEPAM 5 MG/1
5 TABLET ORAL
COMMUNITY
Start: 2017-06-01 | End: 2017-06-20 | Stop reason: CLARIF

## 2017-05-30 NOTE — TELEPHONE ENCOUNTER
Premed for breast MRI call to pt's preferred pharmacy listed in pt's chart. Per Dr Rose, pt may have Valium 5 mg, Disp:1, Si tab buy mouth 30 minutes prior to MRI on 17 ([facility]).

## 2017-05-30 NOTE — PROGRESS NOTES
"Subjective:       Patient ID: Ermelinda Verde is a 56 y.o. female.    Chief Complaint: No chief complaint on file.    Mrs Verde is  a very pleasant 56-year-old -American female referred by  for a diagnosis of left breast cancer.  She is also a patient of .    She is status post 4 cycles of demi-adjuvant Taxotere and Cytoxan.    Patient reports tolerating chemotherapy well. She has some residual fatigue and "heaviness" in her legs. Additionally she has some sensitivity and swelling in several fingertips and separation from nailbed.  No fever, chills, nausea, vomiting or shortness of breath.    She reports one episode of brown drainage then blood from left breast.  She was seen in general surgery for this issue at which time that had resolved. No further drainage/bleeding or tenderness.  She does note a swelling sensation in the left breast. No right breast pain.            Onc History:  She developed a palpable abnormality in her left breast in January 2017 which she noted on self-examination.  A diagnostic mammogram on January 19 showed a greater than 1 cm nodule in the upper outer portion of left breast.  By ultrasound this was lobulated and hypoechoic measuring 1.75 x 1.51 x 1.96 cm.     On January 24, 2017 a core needle biopsy was performed which showed infiltrating ductal carcinoma, high grade.  The tumor was ER negative, NH negative, and HER-2 negative.  A follow-up ultrasound on December 6 showed 2.5 x 2.2 x 1.5 cm left breast mass.  There was no abnormality noted in the left axilla.      I recommended that she have a sentinel lymph node biopsy in order to help determine how much preoperative therapy would be indicated.  She underwent sentinel lymph node biopsy on February 22.  That showed 4 negative lymph nodes.      Review of Systems   Constitutional: Positive for fatigue. Negative for activity change, appetite change, chills, fever and unexpected weight change.   HENT: Negative for " congestion, rhinorrhea, sore throat and trouble swallowing.    Eyes: Negative for visual disturbance.   Respiratory: Negative for cough, chest tightness and shortness of breath.    Cardiovascular: Negative for chest pain, palpitations and leg swelling.   Gastrointestinal: Negative for abdominal pain, blood in stool, constipation, diarrhea, nausea and vomiting.   Genitourinary: Negative for dysuria, hematuria and vaginal bleeding.   Musculoskeletal: Negative for arthralgias, back pain and myalgias.   Skin: Negative for pallor and rash.   Neurological: Negative for dizziness, weakness and headaches.   Hematological: Negative for adenopathy. Does not bruise/bleed easily.   Psychiatric/Behavioral: Negative for dysphoric mood and suicidal ideas. The patient is not nervous/anxious.        Objective:      Physical Exam   Constitutional: She is oriented to person, place, and time. She appears well-developed and well-nourished. No distress.   ECOG 0  Presents with      HENT:   Head: Normocephalic.   Mouth/Throat: Oropharynx is clear and moist. No oropharyngeal exudate.   Eyes: Conjunctivae are normal. Pupils are equal, round, and reactive to light. No scleral icterus.   Neck: Normal range of motion. Neck supple. No thyromegaly present.   Cardiovascular: Normal rate and regular rhythm.    Pulmonary/Chest: Effort normal and breath sounds normal. No respiratory distress.   Right breast without mass, nodule or skin changes. Left breast without mass, nodule or skin changes.  There is no discharge or scale/scab at left nipple. I am unable to palpate any lymphadenopathy.    Abdominal: Soft. Bowel sounds are normal. She exhibits no distension and no mass. There is no tenderness.   Musculoskeletal: Normal range of motion. She exhibits no edema or tenderness.   No spinal or paraspinal tenderness to palpation     Lymphadenopathy:     She has no cervical adenopathy.   Neurological: She is alert and oriented to person, place, and  time. No cranial nerve deficit.   Skin: Skin is warm and dry.   Psychiatric: She has a normal mood and affect. Her behavior is normal. Thought content normal.   Vitals reviewed.      Assessment:       1. Breast cancer of upper-outer quadrant of left female breast        Plan:       Patient tolerated chemotherapy well and has completed adjuvant treatment.  Patient to see Dr. Rose today to discuss surgical options.  Patient without further drainage/discharge/bleeding from left breast, knows to call clinic if that returns and also anticipate upcoming surgery.

## 2017-06-01 ENCOUNTER — HOSPITAL ENCOUNTER (OUTPATIENT)
Dept: RADIOLOGY | Facility: HOSPITAL | Age: 57
Discharge: HOME | End: 2017-06-01
Attending: SURGERY
Payer: COMMERCIAL

## 2017-06-01 DIAGNOSIS — C50.412 MALIGNANT NEOPLASM OF UPPER-OUTER QUADRANT OF LEFT FEMALE BREAST: ICD-10-CM

## 2017-06-01 PROCEDURE — A9577 INJ MULTIHANCE: HCPCS | Performed by: SURGERY

## 2017-06-01 PROCEDURE — 0159T MRI BREAST BILATERAL: CPT | Mod: 26,,, | Performed by: RADIOLOGY

## 2017-06-01 PROCEDURE — 0159T MRI BREAST BILATERAL: CPT | Mod: TC

## 2017-06-01 PROCEDURE — 25500020 PHARM REV CODE 255: Performed by: SURGERY

## 2017-06-01 PROCEDURE — 77059 MRI BREAST BILATERAL: CPT | Mod: 26,,, | Performed by: RADIOLOGY

## 2017-06-01 RX ADMIN — GADOBENATE DIMEGLUMINE 20 ML: 529 INJECTION, SOLUTION INTRAVENOUS at 07:06

## 2017-06-07 ENCOUNTER — TELEPHONE (OUTPATIENT)
Dept: SURGERY | Facility: CLINIC | Age: 57
End: 2017-06-07

## 2017-06-07 RX ORDER — SULFAMETHOXAZOLE AND TRIMETHOPRIM 800; 160 MG/1; MG/1
1 TABLET ORAL 2 TIMES DAILY
COMMUNITY
Start: 2017-06-07 | End: 2017-06-21

## 2017-06-07 NOTE — TELEPHONE ENCOUNTER
Rx for Bactrim DS Disp # 28, Si by mouth BID, called to pt's preferred pharmacy listed in chart per Dr Rose, ([facility]). Rx called to [facility] [phone] doctor hotline. Pt notified that Rx has been called in to the pharmacy.

## 2017-06-07 NOTE — TELEPHONE ENCOUNTER
Pt calls in stating that she received a message from Dr Sewell' office that pt's surgery will be on 6/30/17. Pt states Dr Rose told her that surgery should be approx 6 weeks from last chemo treatment. Informed pt that Dr Rose is aware of the 6/30 surgery date and was the soonest dated that could work for both surgeons and the OR. Pt informed that Dr Rose to have pt start an antibiotic prior to surgery for the breast swelling she has been experiencing. Pt to be informed once Rx called to the pharmacy so that she can start taking them.

## 2017-06-08 ENCOUNTER — HOSPITAL ENCOUNTER (OUTPATIENT)
Dept: RADIOLOGY | Facility: OTHER | Age: 57
Discharge: HOME | End: 2017-06-08
Attending: SURGERY
Payer: COMMERCIAL

## 2017-06-08 DIAGNOSIS — C50.912 MALIGNANT NEOPLASM OF LEFT BREAST: ICD-10-CM

## 2017-06-08 PROCEDURE — 25500020 PHARM REV CODE 255: Performed by: SURGERY

## 2017-06-08 PROCEDURE — 74174 CTA ABD&PLVS W/CONTRAST: CPT | Mod: 26,,, | Performed by: RADIOLOGY

## 2017-06-08 PROCEDURE — 74174 CTA ABD&PLVS W/CONTRAST: CPT | Mod: TC

## 2017-06-08 RX ADMIN — IOHEXOL 100 ML: 350 INJECTION, SOLUTION INTRAVENOUS at 09:06

## 2017-06-09 ENCOUNTER — TELEPHONE (OUTPATIENT)
Dept: HEMATOLOGY/ONCOLOGY | Facility: CLINIC | Age: 57
End: 2017-06-09

## 2017-06-09 NOTE — TELEPHONE ENCOUNTER
----- Message from Fouzia Maxwell sent at 6/9/2017  1:25 PM CDT -----  Contact: self  Pt is returning a missed call.  Contact number [phone]

## 2017-06-09 NOTE — TELEPHONE ENCOUNTER
----- Message from Fouzia Maxwell sent at 6/9/2017  9:16 AM CDT -----  Contact: self  Pt is calling to speak with  for clearance for surgery scheduled on 06/26/17, also pt would like to discuss getting EKG and labs.  Contact number [phone]

## 2017-06-12 ENCOUNTER — TELEPHONE (OUTPATIENT)
Dept: HEMATOLOGY/ONCOLOGY | Facility: CLINIC | Age: 57
End: 2017-06-12

## 2017-06-12 NOTE — TELEPHONE ENCOUNTER
Patient wanted to know if you can order blood work, ekg which is needed before her reconstruction surgery. Patient pcp going to [place]. I asked her to fax us a copy of the order

## 2017-06-12 NOTE — PROGRESS NOTES
Breast Surgery  [facility]  Department of Surgery      REFERRING PROVIDER: No referring provider defined for this encounter.    Chief Complaint: Follow-up (Follow up Breast Procedure)      Subjective:      Patient ID: Ermelinda Verde is a 56 y.o. female who presents following completion of neoadjuvant chemotherapy with 4 cycles of TC.  SLNB prior to chemo was negative for malignancy.  She has some mild neuropathy, but otherwise tolerated chemo well.  She does have swelling of the L breast and reports having an episode during chemo of erythema of the breast.  I was unaware and did not see her during this episode but sounds like cellulitis that resolved on its own.  That may be why her breast is edematous.  She has also had an episode of bloody discharge, but none currently.    She felt a palpable abnormality in January 2017 and was evaluated by her PCP who referred her to Dr. English for evaluation.     Follow-up mammogram (left breast) showed a 1.75 x 1.51 x 1.96cm lobulated, irregular solid mass in the left upper quadrant at 2 o'clock. An ultrasound guided biopsy was performed on 1/24/17 with pathology revealing infiltrating mammary carcinoma of the left breast.    Patient does routinely do self breast exams.  Patient has noted a change on breast exam.  Patient admits to left nipple discharge that is serous, spontaneous and intermittent and that has been present for at least 5 years. Patient denies to previous breast biopsy. Patient denies a personal history of breast cancer. She does have a sister who was diagnosed with breast cancer at age 52 or 53. Her sister underwent bilateral mastectomy with reconstruction; she did not need chemotherapy. Her sister might have had genetic testing.    Findings at that time were the following:   Tumor size: 1.75 x 1.51 x 1.96 cm   Tumor ndgndrndanddndend:nd nd2nd Estrogen Receptor: negative   Progesterone Receptor: negative   Her-2 brendan: negative   Lymph node status: unknown    Lymphatic invasion: unknown     GYN History:  Age of menarche was 11.  Last menstrual period was 2016. Patient denies hormonal therapy. Patient is . Age of first live birth was 21. Patient did breast feed for ~3 months per child.    Past Medical History:   Diagnosis Date    Hypertension      Past Surgical History:   Procedure Laterality Date    BREAST SURGERY  2017    left breast bx     SECTION      D&C      UTERINE FIBROID SURGERY       Current Outpatient Prescriptions on File Prior to Visit   Medication Sig Dispense Refill    amlodipine-benazepril (LOTREL) 10-40 mg per capsule TK 1 C PO QD  3    CALCIUM CARBONATE (CALCIUM 500 ORAL) Take by mouth.      cyanocobalamin (VITAMIN B-12) 500 MCG tablet Take 500 mcg by mouth once daily.      dexamethasone (DECADRON) 4 MG Tab Take the day before and the day after chemotherapy. 40 tablet 0    ferrous gluconate (FERGON) 324 MG tablet TK 1 T PO QD  3    hydrochlorothiazide (HYDRODIURIL) 25 MG tablet TK 1 T PO ONCE A DAY  3    lidocaine-prilocaine (EMLA) cream Apply topically as needed. 5 g 1    metoprolol succinate (TOPROL-XL) 50 MG 24 hr tablet TK 1/2 T PO QD  3    potassium chloride SA (K-DUR,KLOR-CON) 20 MEQ tablet TK 1 T PO  QD  3     No current facility-administered medications on file prior to visit.      Social History     Social History    Marital status:      Spouse name: N/A    Number of children: N/A    Years of education: N/A     Occupational History    Not on file.     Social History Main Topics    Smoking status: Never Smoker    Smokeless tobacco: Not on file    Alcohol use Yes      Comment: wine    Drug use: No    Sexual activity: Not on file     Other Topics Concern    Not on file     Social History Narrative    No narrative on file     Family History   Problem Relation Age of Onset    Heart disease Father     Breast cancer Sister      at age 52 or 53        Review of Systems   Constitutional: Negative  for activity change and unexpected weight change.   HENT: Negative for congestion and trouble swallowing.    Eyes: Negative for pain and redness.   Respiratory: Negative for cough and chest tightness.    Cardiovascular: Negative for chest pain, palpitations and leg swelling.   Gastrointestinal: Negative for abdominal distention, abdominal pain, blood in stool, constipation and nausea.   Endocrine: Negative.    Genitourinary: Negative for difficulty urinating and hematuria.   Musculoskeletal: Negative for joint swelling, myalgias and neck stiffness.        Pain from having scoliosis   Skin: Negative for color change and wound.   Neurological: Negative for dizziness and speech difficulty.   Hematological: Negative.  Does not bruise/bleed easily.   Psychiatric/Behavioral: Negative.  Negative for agitation. The patient is not nervous/anxious.      Objective:     /70 (BP Location: Right arm, Patient Position: Sitting, BP Method: Automatic)   Pulse 89   Temp 98.3 °F (36.8 °C) (Oral)   Wt 94.8 kg (209 lb)   LMP 04/03/2017   BMI 34.78 kg/m²     Physical Exam   Constitutional: She is oriented to person, place, and time. She appears well-developed and well-nourished.   HENT:   Head: Normocephalic and atraumatic.   Cardiovascular: Normal rate, regular rhythm and normal heart sounds.    Pulmonary/Chest: Effort normal and breath sounds normal. No respiratory distress. She exhibits no tenderness. Right breast exhibits no inverted nipple, no mass, no nipple discharge, no skin change and no tenderness. Left breast exhibits no inverted nipple, no skin change and no tenderness. Breasts are symmetrical.       Abdominal: Soft. Bowel sounds are normal. She exhibits no distension and no mass. There is no tenderness.   Musculoskeletal: She exhibits deformity.   Right clavicle more prominent than left clavicle at sternal edge   Neurological: She is alert and oriented to person, place, and time. She has normal reflexes.   Skin:  Skin is warm and dry.         Radiology review: Images personally reviewed by me in the clinic.   Mammogram:    Ultrasound: lobulated hypoechoic lesion measuring 1.75 x 1.96 cm.    Clinic US today:  L br 2OC 10CFN  2.36x1.54 cm mass with irregular borders.  L axilla with multiple visible mostly normal appearing nodes      US today:  LEFT LIMITED ULTRASOUND FINDINGS:  There is an irregular solid mass measuring 2.5 x 2.2 x 1.5 cm seen in the left  breast at 10 o'clock located 7 centimeters from the nipple.    Targeted ultrasound was performed in the left axilla and there is no evidence of  lymphadenopathy.     Impression     Solid mass in the left breast is a known malignancy.    The patient reports that no biopsy marker was placed at the time of biopsy and  no outside post-procedure mammogram was provided for review.  If neoadjuvant  chemotherapy is planned, pre-therapy placement of marker is recommended.    There is no evidence of left axillary adenopathy.    ACR BI-RADS Category 6: Known breast carcinoma      JUANCHO MAYFIELD M.D.         Assessment:       Invasive Mammary Carcinoma UOQ L breast s/p NACT  Plan:     Options for management were discussed with the patient and her family. We reviewed the existing data noting the equivalency of breast conserving surgery with radiation therapy and mastectomy. We also reviewed the guidelines of the National Comprehensive Cancer Network for Stage IIA breast carcinoma. We discussed the need for lumpectomy margins to be negative for carcinoma, the necessity for postoperative radiation therapy after breast conservation in most cases, the possibility of a failed or false negative sentinel lymph node biopsy and the potential need for complete lymphadenectomy for a failed or positive sentinel lymph node biopsy were fully discussed. In the setting of mastectomy, delayed or immediate reconstruction options are available and were discussed.     In the setting of lumpectomy,  radiation therapy would be recommended majority of the time.  The duration and treatment side effects were discussed with the patient.  This will coordinated with the radiation oncologist pending final pathology.    We also discussed the role of systemic therapy in the treatment of breast cancer.  We discussed that this is based on tumor biology and alana status and will be determined based on final pathology.  We discussed that if the cancer is hormone positive, endocrine therapy would be recommended in most cases and its use can reduce the risk of recurrence as well as improve survival. Side effects of treatment were briefly discussed. We also discussed the potential role for chemotherapy based on a number of factors such as tumor phenotype (ER+ vs. triple negative vs. Tox8xpe+) and this would be determined in coordination with the medical oncologist.      She will see Dr. Sewell with plastics for evaluation of flap reconstruction.  Dr. Chandra is out of town for the next few weeks and would be unable to get her scheduled in an appropriate timing.  Dr. De Luna has also been out of town and she would really like the first available appointment.  Will obtain post NACT MRI  She is strongly considering bilateral mastectomies with reconstruction.  Will make final decision when she meets with plastic surgery.  We will put her on a course of antibiotics to assure there is no underlying infection with her development of breast swelling and history of untreated cellulitis.    Patient was educated on breast cancer, receptors, wire localization lumpectomy, mastectomy, sentinel lymph node mapping and biopsy, axillary lymph node dissection, reconstruction, breast prosthesis with post-mastectomy bra and radiation therapy. Patient was given patient information binder [facility].  All her questions were answered.    Total time spent with the patient: 60 minutes.  50 minutes of face to face  consultation and 10 minutes of chart review and coordination of care.

## 2017-06-12 NOTE — TELEPHONE ENCOUNTER
----- Message from Fouzia Maxwell sent at 6/12/2017 10:52 AM CDT -----  Contact: natan  Pt is returning a missed call from Friday.  Contact number [phone]

## 2017-06-13 DIAGNOSIS — C50.412 BREAST CANCER OF UPPER-OUTER QUADRANT OF LEFT FEMALE BREAST: Primary | ICD-10-CM

## 2017-06-14 ENCOUNTER — LAB VISIT (OUTPATIENT)
Dept: LAB | Facility: HOSPITAL | Age: 57
End: 2017-06-14
Attending: INTERNAL MEDICINE
Payer: COMMERCIAL

## 2017-06-14 DIAGNOSIS — Z51.11 ENCOUNTER FOR ANTINEOPLASTIC CHEMOTHERAPY: ICD-10-CM

## 2017-06-14 DIAGNOSIS — C50.412 BREAST CANCER OF UPPER-OUTER QUADRANT OF LEFT FEMALE BREAST: ICD-10-CM

## 2017-06-14 LAB
ANION GAP SERPL CALC-SCNC: 10 MMOL/L
BASOPHILS # BLD AUTO: 0.06 K/UL
BASOPHILS NFR BLD: 1.1 %
BUN SERPL-MCNC: 8 MG/DL
CALCIUM SERPL-MCNC: 9.4 MG/DL
CHLORIDE SERPL-SCNC: 105 MMOL/L
CO2 SERPL-SCNC: 23 MMOL/L
CREAT SERPL-MCNC: 1 MG/DL
DIFFERENTIAL METHOD: ABNORMAL
EOSINOPHIL # BLD AUTO: 0.2 K/UL
EOSINOPHIL NFR BLD: 3.6 %
ERYTHROCYTE [DISTWIDTH] IN BLOOD BY AUTOMATED COUNT: 16.9 %
EST. GFR  (AFRICAN AMERICAN): >60 ML/MIN/1.73 M^2
EST. GFR  (NON AFRICAN AMERICAN): >60 ML/MIN/1.73 M^2
GLUCOSE SERPL-MCNC: 114 MG/DL
HCT VFR BLD AUTO: 38.8 %
HGB BLD-MCNC: 12.1 G/DL
LYMPHOCYTES # BLD AUTO: 2.2 K/UL
LYMPHOCYTES NFR BLD: 39.5 %
MCH RBC QN AUTO: 27.3 PG
MCHC RBC AUTO-ENTMCNC: 31.2 %
MCV RBC AUTO: 87 FL
MONOCYTES # BLD AUTO: 0.4 K/UL
MONOCYTES NFR BLD: 7.5 %
NEUTROPHILS # BLD AUTO: 2.7 K/UL
NEUTROPHILS NFR BLD: 48.3 %
PLATELET # BLD AUTO: 235 K/UL
PMV BLD AUTO: 8.9 FL
POTASSIUM SERPL-SCNC: 4.5 MMOL/L
RBC # BLD AUTO: 4.44 M/UL
SODIUM SERPL-SCNC: 138 MMOL/L
WBC # BLD AUTO: 5.5 K/UL

## 2017-06-14 PROCEDURE — 85025 COMPLETE CBC W/AUTO DIFF WBC: CPT

## 2017-06-14 PROCEDURE — 80048 BASIC METABOLIC PNL TOTAL CA: CPT

## 2017-06-14 PROCEDURE — 36415 COLL VENOUS BLD VENIPUNCTURE: CPT

## 2017-06-19 NOTE — PLAN OF CARE
06/19/17 1650   ED Admissions Case Approval   ED Admissions Case Approval (!) CM Approved  (IP )

## 2017-06-20 ENCOUNTER — HOSPITAL ENCOUNTER (OUTPATIENT)
Dept: PREADMISSION TESTING | Facility: OTHER | Age: 57
Discharge: HOME | End: 2017-06-20
Attending: SURGERY
Payer: COMMERCIAL

## 2017-06-20 ENCOUNTER — ANESTHESIA EVENT (OUTPATIENT)
Dept: SURGERY | Facility: OTHER | Age: 57
DRG: 583 | End: 2017-06-20
Payer: COMMERCIAL

## 2017-06-20 VITALS
SYSTOLIC BLOOD PRESSURE: 132 MMHG | DIASTOLIC BLOOD PRESSURE: 76 MMHG | OXYGEN SATURATION: 99 % | HEART RATE: 81 BPM | WEIGHT: 209 LBS | TEMPERATURE: 98 F | HEIGHT: 65 IN | BODY MASS INDEX: 34.82 KG/M2

## 2017-06-20 RX ORDER — LIDOCAINE HYDROCHLORIDE 10 MG/ML
1 INJECTION, SOLUTION EPIDURAL; INFILTRATION; INTRACAUDAL; PERINEURAL ONCE
Status: CANCELLED | OUTPATIENT
Start: 2017-06-20 | End: 2017-06-20

## 2017-06-20 RX ORDER — SODIUM CHLORIDE, SODIUM LACTATE, POTASSIUM CHLORIDE, CALCIUM CHLORIDE 600; 310; 30; 20 MG/100ML; MG/100ML; MG/100ML; MG/100ML
INJECTION, SOLUTION INTRAVENOUS CONTINUOUS
Status: CANCELLED | OUTPATIENT
Start: 2017-06-20

## 2017-06-20 RX ORDER — PREGABALIN 75 MG/1
150 CAPSULE ORAL
Status: ACTIVE | OUTPATIENT
Start: 2017-06-20 | End: 2017-06-20

## 2017-06-20 RX ORDER — SCOLOPAMINE TRANSDERMAL SYSTEM 1 MG/1
1 PATCH, EXTENDED RELEASE TRANSDERMAL ONCE
Status: CANCELLED | OUTPATIENT
Start: 2017-06-20 | End: 2017-06-20

## 2017-06-20 NOTE — DISCHARGE INSTRUCTIONS
PRE-ADMIT TESTING -  [phone]    [address]  [facility]        OUTPATIENT SURGERY UNIT - [phone]    Your surgery has been scheduled at [facility]. We are pleased to have the opportunity to serve you. For Further Information please call [phone].    On the day of surgery please report to the Information Desk on the 1st floor.    · CONTACT YOUR PHYSICIAN'S OFFICE THE DAY PRIOR TO YOUR SURGERY TO OBTAIN YOUR ARRIVAL TIME.     · The evening before surgery do not eat anything after 9 p.m. ( this includes hard candy, chewing gum and mints).  You may only have GATORADE, POWERADE AND WATER  from 9 p.m. until you leave your home.   DO NOT DRINK ANY LIQUIDS ON THE WAY TO THE HOSPITAL.      SPECIAL MEDICATION INSTRUCTIONS: TAKE medications checked off by the Anesthesiologist on your Medication List.    Angiogram Patients: Take medications as instructed by your physician, including aspirin.     Surgery Patients:    If you take ASPIRIN - Your PHYSICIAN/SURGEON will need to inform you IF/OR when you need to stop taking aspirin prior to your surgery.     Do Not take any medications containing IBUPROFEN.  Do Not Wear any make-up or dark nail polish   (especially eye make-up) to surgery. If you come to surgery with makeup on you will be required to remove the makeup or nail polish.    Do not shave your surgical area at least 5 days prior to your surgery. The surgical prep will be performed at the hospital according to Infection Control regulations.    Leave all valuables at home.   Do Not wear any jewelry or watches, including any metal in body piercings.  Contact Lens must be removed before surgery. Either do not wear the contact lens or bring a case and solution for storage.  Please bring a container for eyeglasses or dentures as required.  Bring any paperwork your physician has provided, such as consent forms,  history and physicals, doctor's orders, etc.   Bring comfortable clothes  that are loose fitting to wear upon discharge. Take into consideration the type of surgery being performed.  Maintain your diet as advised per your physician the day prior to surgery.      Adequate rest the night before surgery is advised.   Park in the Parking lot behind the hospital or in the [facility] across the street from the parking lot. Parking is complimentary.  If you will be discharged the same day as your procedure, please arrange for a responsible adult to drive you home or to accompany you if traveling by taxi.   YOU WILL NOT BE PERMITTED TO DRIVE OR TO LEAVE THE HOSPITAL ALONE AFTER SURGERY.   It is strongly recommended that you arrange for someone to remain with you for the first 24 hrs following your surgery.       Thank you for your cooperation.  The Staff of [facility].        Bathing Instructions                                                                 Please shower the evening before and morning of your procedure with    ANTIBACTERIAL SOAP. ( DIAL, etc )  Concentrate on the surgical area   for at least 3 minutes and rinse completely. Dry off as usual.   Do not use any deodorant, powder, body lotions, perfume, after shave or    cologne.

## 2017-06-20 NOTE — ANESTHESIA PREPROCEDURE EVALUATION
06/20/2017  Ermelinda Verde is a 56 y.o., female.    Anesthesia Evaluation    I have reviewed the Patient Summary Reports.    I have reviewed the Nursing Notes.   I have reviewed the Medications.     Review of Systems  Anesthesia Hx:  No problems with previous Anesthesia  History of prior surgery of interest to airway management or planning: Denies Family Hx of Anesthesia complications.   Denies Personal Hx of Anesthesia complications.   Social:  Non-Smoker    Hematology/Oncology:  Hematology Normal      Current/Recent Cancer. Breast left chemotherapy Oncology Comments: Chemo ended 6 weeks ago     EENT/Dental:EENT/Dental Normal   Cardiovascular:   Exercise tolerance: good Hypertension, well controlled    Renal/:  Renal/ Normal     Hepatic/GI:  Hepatic/GI Normal    Musculoskeletal:  Musculoskeletal Normal    Neurological:  Neurology Normal    Endocrine:  Endocrine Normal    Dermatological:  Skin Normal    Psych:  Psychiatric Normal           Physical Exam  General:  Well nourished    Airway/Jaw/Neck:  Airway Findings: Mouth Opening: Normal Tongue: Normal      Dental:  Dental Findings: molar caps         Mental Status:  Mental Status Findings:  Cooperative, Alert and Oriented         Anesthesia Plan  Type of Anesthesia, risks & benefits discussed:  Anesthesia Type:  general  Patient's Preference:   Intra-op Monitoring Plan:   Intra-op Monitoring Plan Comments:   Post Op Pain Control Plan:   Post Op Pain Control Plan Comments:   Induction:   IV  Beta Blocker:         Informed Consent: Patient understands risks and agrees with Anesthesia plan.  Questions answered. Anesthesia consent signed with patient.  ASA Score: 2     Day of Surgery Review of History & Physical:    H&P update referred to the surgeon.     Anesthesia Plan Notes: Labs and EKG in epic,IV and BP on right        Ready For Surgery From  Anesthesia Perspective.

## 2017-06-26 ENCOUNTER — ANESTHESIA (OUTPATIENT)
Dept: SURGERY | Facility: OTHER | Age: 57
DRG: 583 | End: 2017-06-26
Payer: COMMERCIAL

## 2017-06-26 ENCOUNTER — HOSPITAL ENCOUNTER (INPATIENT)
Facility: OTHER | Age: 57
LOS: 3 days | Discharge: HOME | DRG: 583 | End: 2017-06-29
Attending: SURGERY | Admitting: SURGERY
Payer: COMMERCIAL

## 2017-06-26 DIAGNOSIS — Z85.3 PERSONAL HISTORY OF MALIGNANT NEOPLASM OF BREAST: ICD-10-CM

## 2017-06-26 DIAGNOSIS — C50.412 BREAST CANCER OF UPPER-OUTER QUADRANT OF LEFT FEMALE BREAST: Primary | ICD-10-CM

## 2017-06-26 LAB
B-HCG UR QL: NEGATIVE
CTP QC/QA: YES

## 2017-06-26 PROCEDURE — C1729 CATH, DRAINAGE: HCPCS | Performed by: SURGERY

## 2017-06-26 PROCEDURE — 25000003 PHARM REV CODE 250: Performed by: ANESTHESIOLOGY

## 2017-06-26 PROCEDURE — 27800903 OPTIME MED/SURG SUP & DEVICES OTHER IMPLANTS: Performed by: SURGERY

## 2017-06-26 PROCEDURE — 63600175 PHARM REV CODE 636 W HCPCS: Performed by: NURSE ANESTHETIST, CERTIFIED REGISTERED

## 2017-06-26 PROCEDURE — 27201423 OPTIME MED/SURG SUP & DEVICES STERILE SUPPLY: Performed by: SURGERY

## 2017-06-26 PROCEDURE — 88305 TISSUE EXAM BY PATHOLOGIST: CPT | Performed by: PATHOLOGY

## 2017-06-26 PROCEDURE — 25000003 PHARM REV CODE 250: Performed by: NURSE ANESTHETIST, CERTIFIED REGISTERED

## 2017-06-26 PROCEDURE — 0HRU077 REPLACEMENT OF LEFT BREAST USING DEEP INFERIOR EPIGASTRIC ARTERY PERFORATOR FLAP, OPEN APPROACH: ICD-10-PCS | Performed by: SURGERY

## 2017-06-26 PROCEDURE — 63600175 PHARM REV CODE 636 W HCPCS: Performed by: ANESTHESIOLOGY

## 2017-06-26 PROCEDURE — 25000003 PHARM REV CODE 250: Performed by: SURGERY

## 2017-06-26 PROCEDURE — 11000001 HC ACUTE MED/SURG PRIVATE ROOM

## 2017-06-26 PROCEDURE — 63600175 PHARM REV CODE 636 W HCPCS: Performed by: SURGERY

## 2017-06-26 PROCEDURE — 36000708 HC OR TIME LEV III 1ST 15 MIN: Performed by: SURGERY

## 2017-06-26 PROCEDURE — 0HBU0ZZ EXCISION OF LEFT BREAST, OPEN APPROACH: ICD-10-PCS | Performed by: SURGERY

## 2017-06-26 PROCEDURE — 88305 TISSUE EXAM BY PATHOLOGIST: CPT | Mod: 26,,, | Performed by: PATHOLOGY

## 2017-06-26 PROCEDURE — 88307 TISSUE EXAM BY PATHOLOGIST: CPT | Mod: 26,,, | Performed by: PATHOLOGY

## 2017-06-26 PROCEDURE — 37000008 HC ANESTHESIA 1ST 15 MINUTES: Performed by: SURGERY

## 2017-06-26 PROCEDURE — 81025 URINE PREGNANCY TEST: CPT | Performed by: ANESTHESIOLOGY

## 2017-06-26 PROCEDURE — 37000009 HC ANESTHESIA EA ADD 15 MINS: Performed by: SURGERY

## 2017-06-26 PROCEDURE — 71000039 HC RECOVERY, EACH ADD'L HOUR: Performed by: SURGERY

## 2017-06-26 PROCEDURE — P9045 ALBUMIN (HUMAN), 5%, 250 ML: HCPCS | Performed by: NURSE ANESTHETIST, CERTIFIED REGISTERED

## 2017-06-26 PROCEDURE — 88307 TISSUE EXAM BY PATHOLOGIST: CPT | Performed by: PATHOLOGY

## 2017-06-26 PROCEDURE — 71000033 HC RECOVERY, INTIAL HOUR: Performed by: SURGERY

## 2017-06-26 PROCEDURE — 36000709 HC OR TIME LEV III EA ADD 15 MIN: Performed by: SURGERY

## 2017-06-26 DEVICE — COUPLER MICROVAS ANSTMS 2.5MM: Type: IMPLANTABLE DEVICE | Site: BREAST | Status: FUNCTIONAL

## 2017-06-26 RX ORDER — CEFAZOLIN SODIUM 2 G/50ML
2 SOLUTION INTRAVENOUS
Status: COMPLETED | OUTPATIENT
Start: 2017-06-26 | End: 2017-06-27

## 2017-06-26 RX ORDER — DOCUSATE SODIUM 100 MG/1
100 CAPSULE, LIQUID FILLED ORAL EVERY 12 HOURS
Status: DISCONTINUED | OUTPATIENT
Start: 2017-06-26 | End: 2017-06-29 | Stop reason: HOSPADM

## 2017-06-26 RX ORDER — ONDANSETRON 8 MG/1
8 TABLET, ORALLY DISINTEGRATING ORAL EVERY 6 HOURS PRN
Status: DISCONTINUED | OUTPATIENT
Start: 2017-06-26 | End: 2017-06-29 | Stop reason: HOSPADM

## 2017-06-26 RX ORDER — HEPARIN SODIUM 5000 [USP'U]/ML
5000 INJECTION, SOLUTION INTRAVENOUS; SUBCUTANEOUS EVERY 8 HOURS
Status: DISCONTINUED | OUTPATIENT
Start: 2017-06-26 | End: 2017-06-29 | Stop reason: HOSPADM

## 2017-06-26 RX ORDER — SCOLOPAMINE TRANSDERMAL SYSTEM 1 MG/1
1 PATCH, EXTENDED RELEASE TRANSDERMAL ONCE
Status: COMPLETED | OUTPATIENT
Start: 2017-06-26 | End: 2017-06-26

## 2017-06-26 RX ORDER — DEXTROSE MONOHYDRATE, SODIUM CHLORIDE, AND POTASSIUM CHLORIDE 50; 1.49; 4.5 G/1000ML; G/1000ML; G/1000ML
INJECTION, SOLUTION INTRAVENOUS CONTINUOUS
Status: DISCONTINUED | OUTPATIENT
Start: 2017-06-26 | End: 2017-06-27

## 2017-06-26 RX ORDER — PROPOFOL 10 MG/ML
VIAL (ML) INTRAVENOUS
Status: DISCONTINUED | OUTPATIENT
Start: 2017-06-26 | End: 2017-06-26

## 2017-06-26 RX ORDER — DIPHENHYDRAMINE HCL 25 MG
25 CAPSULE ORAL EVERY 4 HOURS PRN
Status: DISCONTINUED | OUTPATIENT
Start: 2017-06-26 | End: 2017-06-29 | Stop reason: HOSPADM

## 2017-06-26 RX ORDER — HYDROMORPHONE HYDROCHLORIDE 1 MG/ML
1 INJECTION, SOLUTION INTRAMUSCULAR; INTRAVENOUS; SUBCUTANEOUS EVERY 4 HOURS PRN
Status: DISCONTINUED | OUTPATIENT
Start: 2017-06-26 | End: 2017-06-29 | Stop reason: HOSPADM

## 2017-06-26 RX ORDER — PAPAVERINE HYDROCHLORIDE 30 MG/ML
INJECTION INTRAMUSCULAR; INTRAVENOUS
Status: DISCONTINUED | OUTPATIENT
Start: 2017-06-26 | End: 2017-06-26 | Stop reason: HOSPADM

## 2017-06-26 RX ORDER — GLYCOPYRROLATE 0.2 MG/ML
INJECTION INTRAMUSCULAR; INTRAVENOUS
Status: DISCONTINUED | OUTPATIENT
Start: 2017-06-26 | End: 2017-06-26

## 2017-06-26 RX ORDER — FENTANYL CITRATE 50 UG/ML
INJECTION, SOLUTION INTRAMUSCULAR; INTRAVENOUS
Status: DISCONTINUED | OUTPATIENT
Start: 2017-06-26 | End: 2017-06-26

## 2017-06-26 RX ORDER — LIDOCAINE HCL/PF 100 MG/5ML
SYRINGE (ML) INTRAVENOUS
Status: DISCONTINUED | OUTPATIENT
Start: 2017-06-26 | End: 2017-06-26

## 2017-06-26 RX ORDER — HEPARIN SODIUM 5000 [USP'U]/ML
5000 INJECTION, SOLUTION INTRAVENOUS; SUBCUTANEOUS
Status: COMPLETED | OUTPATIENT
Start: 2017-06-26 | End: 2017-06-26

## 2017-06-26 RX ORDER — SODIUM CHLORIDE 0.9 % (FLUSH) 0.9 %
3 SYRINGE (ML) INJECTION EVERY 8 HOURS
Status: DISCONTINUED | OUTPATIENT
Start: 2017-06-26 | End: 2017-06-26 | Stop reason: HOSPADM

## 2017-06-26 RX ORDER — HYDROCODONE BITARTRATE AND ACETAMINOPHEN 5; 325 MG/1; MG/1
1 TABLET ORAL EVERY 4 HOURS PRN
Status: DISCONTINUED | OUTPATIENT
Start: 2017-06-26 | End: 2017-06-29 | Stop reason: HOSPADM

## 2017-06-26 RX ORDER — ONDANSETRON 2 MG/ML
4 INJECTION INTRAMUSCULAR; INTRAVENOUS EVERY 6 HOURS PRN
Status: DISCONTINUED | OUTPATIENT
Start: 2017-06-26 | End: 2017-06-29 | Stop reason: HOSPADM

## 2017-06-26 RX ORDER — CEFAZOLIN SODIUM 2 G/50ML
2 SOLUTION INTRAVENOUS
Status: COMPLETED | OUTPATIENT
Start: 2017-06-26 | End: 2017-06-26

## 2017-06-26 RX ORDER — NEOSTIGMINE METHYLSULFATE 1 MG/ML
INJECTION, SOLUTION INTRAVENOUS
Status: DISCONTINUED | OUTPATIENT
Start: 2017-06-26 | End: 2017-06-26

## 2017-06-26 RX ORDER — ROCURONIUM BROMIDE 10 MG/ML
INJECTION, SOLUTION INTRAVENOUS
Status: DISCONTINUED | OUTPATIENT
Start: 2017-06-26 | End: 2017-06-26

## 2017-06-26 RX ORDER — HEPARIN SODIUM 10000 [USP'U]/ML
INJECTION, SOLUTION INTRAVENOUS; SUBCUTANEOUS
Status: DISCONTINUED | OUTPATIENT
Start: 2017-06-26 | End: 2017-06-26 | Stop reason: HOSPADM

## 2017-06-26 RX ORDER — SODIUM CHLORIDE, SODIUM LACTATE, POTASSIUM CHLORIDE, CALCIUM CHLORIDE 600; 310; 30; 20 MG/100ML; MG/100ML; MG/100ML; MG/100ML
INJECTION, SOLUTION INTRAVENOUS CONTINUOUS
Status: DISCONTINUED | OUTPATIENT
Start: 2017-06-26 | End: 2017-06-29 | Stop reason: HOSPADM

## 2017-06-26 RX ORDER — ONDANSETRON 2 MG/ML
INJECTION INTRAMUSCULAR; INTRAVENOUS
Status: DISCONTINUED | OUTPATIENT
Start: 2017-06-26 | End: 2017-06-26

## 2017-06-26 RX ORDER — LIDOCAINE HYDROCHLORIDE 10 MG/ML
1 INJECTION, SOLUTION EPIDURAL; INFILTRATION; INTRACAUDAL; PERINEURAL ONCE
Status: DISCONTINUED | OUTPATIENT
Start: 2017-06-26 | End: 2017-06-26 | Stop reason: HOSPADM

## 2017-06-26 RX ORDER — HYDROMORPHONE HYDROCHLORIDE 2 MG/ML
0.4 INJECTION, SOLUTION INTRAMUSCULAR; INTRAVENOUS; SUBCUTANEOUS EVERY 5 MIN PRN
Status: DISCONTINUED | OUTPATIENT
Start: 2017-06-26 | End: 2017-06-26 | Stop reason: HOSPADM

## 2017-06-26 RX ORDER — PHENYLEPHRINE HYDROCHLORIDE 10 MG/ML
INJECTION INTRAVENOUS
Status: DISCONTINUED | OUTPATIENT
Start: 2017-06-26 | End: 2017-06-26

## 2017-06-26 RX ORDER — CEPHALEXIN 500 MG/1
500 CAPSULE ORAL EVERY 6 HOURS
Status: DISCONTINUED | OUTPATIENT
Start: 2017-06-27 | End: 2017-06-29 | Stop reason: HOSPADM

## 2017-06-26 RX ORDER — SODIUM CHLORIDE 0.9 % (FLUSH) 0.9 %
3 SYRINGE (ML) INJECTION
Status: DISCONTINUED | OUTPATIENT
Start: 2017-06-26 | End: 2017-06-26

## 2017-06-26 RX ORDER — BACITRACIN 50000 [IU]/1
INJECTION, POWDER, FOR SOLUTION INTRAMUSCULAR
Status: DISCONTINUED | OUTPATIENT
Start: 2017-06-26 | End: 2017-06-26 | Stop reason: HOSPADM

## 2017-06-26 RX ORDER — ALBUMIN HUMAN 50 G/1000ML
SOLUTION INTRAVENOUS CONTINUOUS PRN
Status: DISCONTINUED | OUTPATIENT
Start: 2017-06-26 | End: 2017-06-26

## 2017-06-26 RX ORDER — FENTANYL CITRATE 50 UG/ML
25 INJECTION, SOLUTION INTRAMUSCULAR; INTRAVENOUS EVERY 5 MIN PRN
Status: DISCONTINUED | OUTPATIENT
Start: 2017-06-26 | End: 2017-06-26 | Stop reason: HOSPADM

## 2017-06-26 RX ORDER — OXYCODONE HYDROCHLORIDE 5 MG/1
5 TABLET ORAL
Status: DISCONTINUED | OUTPATIENT
Start: 2017-06-26 | End: 2017-06-26 | Stop reason: HOSPADM

## 2017-06-26 RX ORDER — MEPERIDINE HYDROCHLORIDE 50 MG/ML
12.5 INJECTION INTRAMUSCULAR; INTRAVENOUS; SUBCUTANEOUS ONCE AS NEEDED
Status: DISCONTINUED | OUTPATIENT
Start: 2017-06-26 | End: 2017-06-26 | Stop reason: HOSPADM

## 2017-06-26 RX ORDER — INDOCYANINE GREEN AND WATER 25 MG
KIT INJECTION
Status: DISCONTINUED | OUTPATIENT
Start: 2017-06-26 | End: 2017-06-26

## 2017-06-26 RX ORDER — ONDANSETRON 2 MG/ML
4 INJECTION INTRAMUSCULAR; INTRAVENOUS ONCE AS NEEDED
Status: DISCONTINUED | OUTPATIENT
Start: 2017-06-26 | End: 2017-06-26 | Stop reason: HOSPADM

## 2017-06-26 RX ORDER — MIDAZOLAM HYDROCHLORIDE 1 MG/ML
INJECTION INTRAMUSCULAR; INTRAVENOUS
Status: DISCONTINUED | OUTPATIENT
Start: 2017-06-26 | End: 2017-06-26

## 2017-06-26 RX ADMIN — NEOSTIGMINE METHYLSULFATE 5 MG: 1 INJECTION INTRAVENOUS at 06:06

## 2017-06-26 RX ADMIN — HYDROMORPHONE HYDROCHLORIDE 0.4 MG: 2 INJECTION INTRAMUSCULAR; INTRAVENOUS; SUBCUTANEOUS at 07:06

## 2017-06-26 RX ADMIN — EPHEDRINE SULFATE 10 MG: 50 INJECTION INTRAMUSCULAR; INTRAVENOUS; SUBCUTANEOUS at 04:06

## 2017-06-26 RX ADMIN — ROCURONIUM BROMIDE 10 MG: 10 INJECTION INTRAVENOUS at 01:06

## 2017-06-26 RX ADMIN — EPHEDRINE SULFATE 10 MG: 50 INJECTION INTRAMUSCULAR; INTRAVENOUS; SUBCUTANEOUS at 06:06

## 2017-06-26 RX ADMIN — MIDAZOLAM HYDROCHLORIDE 2 MG: 1 INJECTION, SOLUTION INTRAMUSCULAR; INTRAVENOUS at 12:06

## 2017-06-26 RX ADMIN — CEFAZOLIN SODIUM 2 G: 2 SOLUTION INTRAVENOUS at 01:06

## 2017-06-26 RX ADMIN — INDOCYANINE GREEN 10 MG: KIT INTRAVENOUS at 04:06

## 2017-06-26 RX ADMIN — LIDOCAINE HYDROCHLORIDE 75 MG: 20 INJECTION, SOLUTION INTRAVENOUS at 12:06

## 2017-06-26 RX ADMIN — PROPOFOL 180 MG: 10 INJECTION, EMULSION INTRAVENOUS at 12:06

## 2017-06-26 RX ADMIN — ROCURONIUM BROMIDE 10 MG: 10 INJECTION INTRAVENOUS at 03:06

## 2017-06-26 RX ADMIN — FENTANYL CITRATE 100 MCG: 50 INJECTION, SOLUTION INTRAMUSCULAR; INTRAVENOUS at 12:06

## 2017-06-26 RX ADMIN — SODIUM CHLORIDE, SODIUM LACTATE, POTASSIUM CHLORIDE, AND CALCIUM CHLORIDE: 600; 310; 30; 20 INJECTION, SOLUTION INTRAVENOUS at 05:06

## 2017-06-26 RX ADMIN — ROCURONIUM BROMIDE 40 MG: 10 INJECTION INTRAVENOUS at 12:06

## 2017-06-26 RX ADMIN — SODIUM CHLORIDE, SODIUM LACTATE, POTASSIUM CHLORIDE, AND CALCIUM CHLORIDE: 600; 310; 30; 20 INJECTION, SOLUTION INTRAVENOUS at 11:06

## 2017-06-26 RX ADMIN — SODIUM CHLORIDE, SODIUM LACTATE, POTASSIUM CHLORIDE, AND CALCIUM CHLORIDE: 600; 310; 30; 20 INJECTION, SOLUTION INTRAVENOUS at 01:06

## 2017-06-26 RX ADMIN — PHENYLEPHRINE HYDROCHLORIDE 100 MCG: 10 INJECTION INTRAVENOUS at 12:06

## 2017-06-26 RX ADMIN — CARBOXYMETHYLCELLULOSE SODIUM 2 DROP: 2.5 SOLUTION/ DROPS OPHTHALMIC at 12:06

## 2017-06-26 RX ADMIN — FENTANYL CITRATE 50 MCG: 50 INJECTION, SOLUTION INTRAMUSCULAR; INTRAVENOUS at 04:06

## 2017-06-26 RX ADMIN — ALBUMIN (HUMAN): 2.5 SOLUTION INTRAVENOUS at 04:06

## 2017-06-26 RX ADMIN — SCOPALAMINE 1.5 MG: 1 PATCH, EXTENDED RELEASE TRANSDERMAL at 10:06

## 2017-06-26 RX ADMIN — DEXTROSE MONOHYDRATE, SODIUM CHLORIDE, AND POTASSIUM CHLORIDE: 50; 4.5; 1.49 INJECTION, SOLUTION INTRAVENOUS at 10:06

## 2017-06-26 RX ADMIN — GLYCOPYRROLATE 0.8 MG: 0.2 INJECTION, SOLUTION INTRAMUSCULAR; INTRAVENOUS at 06:06

## 2017-06-26 RX ADMIN — FENTANYL CITRATE 50 MCG: 50 INJECTION, SOLUTION INTRAMUSCULAR; INTRAVENOUS at 05:06

## 2017-06-26 RX ADMIN — DOCUSATE SODIUM 100 MG: 100 CAPSULE, LIQUID FILLED ORAL at 10:06

## 2017-06-26 RX ADMIN — EPHEDRINE SULFATE 10 MG: 50 INJECTION INTRAMUSCULAR; INTRAVENOUS; SUBCUTANEOUS at 02:06

## 2017-06-26 RX ADMIN — EPHEDRINE SULFATE 10 MG: 50 INJECTION INTRAMUSCULAR; INTRAVENOUS; SUBCUTANEOUS at 05:06

## 2017-06-26 RX ADMIN — HEPARIN SODIUM 5000 UNITS: 5000 INJECTION, SOLUTION INTRAVENOUS; SUBCUTANEOUS at 10:06

## 2017-06-26 RX ADMIN — ROCURONIUM BROMIDE 20 MG: 10 INJECTION INTRAVENOUS at 02:06

## 2017-06-26 RX ADMIN — PHENYLEPHRINE HYDROCHLORIDE 100 MCG: 10 INJECTION INTRAVENOUS at 01:06

## 2017-06-26 RX ADMIN — ONDANSETRON 4 MG: 2 INJECTION INTRAMUSCULAR; INTRAVENOUS at 06:06

## 2017-06-26 RX ADMIN — CEFAZOLIN SODIUM 2 G: 2 SOLUTION INTRAVENOUS at 08:06

## 2017-06-26 NOTE — PLAN OF CARE
06/26/17 1037   ED Admissions Case Approval   ED Admissions Case Approval (!) CM Approved  (IP )

## 2017-06-26 NOTE — BRIEF OP NOTE
[facility]  Surgery Department  Operative Note    SUMMARY     Date of Procedure: 6/26/2017     Procedure: Procedure(s) (LRB):  RECONSTRUCTION-BREAST/FLAP-SCOTT (Left)  MASTECTOMY (Left)     Surgeon(s) and Role:  Panel 1:     * Sukhjinder Sewell MD - Primary     * Kamran Khoobehi, MD - Assisting    Panel 2:     * Regina Rose MD - Primary        Pre-Operative Diagnosis: H/O malignant neoplasm of breast [Z85.3]    Post-Operative Diagnosis: Post-Op Diagnosis Codes:     * H/O malignant neoplasm of breast [Z85.3]     * Malignant neoplasm of upper-outer quadrant of left female breast [C50.412]    Anesthesia: General    Technical Procedures Used: 3 perf lateral row SCOTT    Description of the Findings of the Procedure: As aboce    Significant Surgical Tasks Conducted by the Assistant(s), if Applicable: Harvesting and anastomosis    Complications: No    Estimated Blood Loss (EBL): * No values recorded between 6/26/2017  1:10 PM and 6/26/2017  6:52 PM *           Implants:   Implant Name Type Inv. Item Serial No.  Lot No. LRB No. Used    MICROVAS ANSTMS 2.5MM - [id number]    MICROVAS ANSTMS 2.5MM   [employer] [id number] Left 3       Specimens:   Specimen (12h ago through future)    Start     Ordered    06/26/17 1448  Specimen to Pathology - Surgery  Once     Comments:  1. Left breast short superior long lateral2. Left breast anterior lateral margin ink marks new margin      06/26/17 1448                  Condition: Good    Disposition: PACU - hemodynamically stable.    Attestation: I was present and scrubbed for the entire procedure.

## 2017-06-26 NOTE — PLAN OF CARE
Patient prefers to have Júnior Verde (spouse) present for discharge teaching. Please contact them @[phone].

## 2017-06-26 NOTE — TRANSFER OF CARE
"Anesthesia Transfer of Care Note    Patient: Ermelinda Verde    Procedure(s) Performed: Procedure(s) (LRB):  RECONSTRUCTION-BREAST/FLAP-SCOTT (Left)  MASTECTOMY (Left)    Patient location: [facility]    Anesthesia Type: general    Transport from OR: Transported from OR on 2-3 L/min O2 by NC with adequate spontaneous ventilation    Post pain: adequate analgesia    Post assessment: no apparent anesthetic complications    Post vital signs: stable    Level of consciousness: awake, alert and oriented    Nausea/Vomiting: no nausea/vomiting    Complications: none    Transfer of care protocol was followed      Last vitals:   Visit Vitals  /89 (BP Location: Right arm, Patient Position: Sitting, BP Method: Automatic)   Pulse 81   Temp 37 °C (98.6 °F) (Oral)   Resp 16   Ht 5' 5" (1.651 m)   Wt 94.8 kg (209 lb)   LMP 04/08/2017   SpO2 100%   BMI 34.78 kg/m²     "

## 2017-06-26 NOTE — H&P (VIEW-ONLY)
Breast Surgery  [facility]  Department of Surgery      REFERRING PROVIDER: No referring provider defined for this encounter.    Chief Complaint: Follow-up (Follow up Breast Procedure)      Subjective:      Patient ID: Ermelinda Verde is a 56 y.o. female who presents following completion of neoadjuvant chemotherapy with 4 cycles of TC.  SLNB prior to chemo was negative for malignancy.  She has some mild neuropathy, but otherwise tolerated chemo well.  She does have swelling of the L breast and reports having an episode during chemo of erythema of the breast.  I was unaware and did not see her during this episode but sounds like cellulitis that resolved on its own.  That may be why her breast is edematous.  She has also had an episode of bloody discharge, but none currently.    She felt a palpable abnormality in January 2017 and was evaluated by her PCP who referred her to Dr. English for evaluation.     Follow-up mammogram (left breast) showed a 1.75 x 1.51 x 1.96cm lobulated, irregular solid mass in the left upper quadrant at 2 o'clock. An ultrasound guided biopsy was performed on 1/24/17 with pathology revealing infiltrating mammary carcinoma of the left breast.    Patient does routinely do self breast exams.  Patient has noted a change on breast exam.  Patient admits to left nipple discharge that is serous, spontaneous and intermittent and that has been present for at least 5 years. Patient denies to previous breast biopsy. Patient denies a personal history of breast cancer. She does have a sister who was diagnosed with breast cancer at age 52 or 53. Her sister underwent bilateral mastectomy with reconstruction; she did not need chemotherapy. Her sister might have had genetic testing.    Findings at that time were the following:   Tumor size: 1.75 x 1.51 x 1.96 cm   Tumor ndgndrndanddndend:nd nd2nd Estrogen Receptor: negative   Progesterone Receptor: negative   Her-2 brendan: negative   Lymph node status: unknown    Lymphatic invasion: unknown     GYN History:  Age of menarche was 11.  Last menstrual period was 2016. Patient denies hormonal therapy. Patient is . Age of first live birth was 21. Patient did breast feed for ~3 months per child.    Past Medical History:   Diagnosis Date    Hypertension      Past Surgical History:   Procedure Laterality Date    BREAST SURGERY  2017    left breast bx     SECTION      D&C      UTERINE FIBROID SURGERY       Current Outpatient Prescriptions on File Prior to Visit   Medication Sig Dispense Refill    amlodipine-benazepril (LOTREL) 10-40 mg per capsule TK 1 C PO QD  3    CALCIUM CARBONATE (CALCIUM 500 ORAL) Take by mouth.      cyanocobalamin (VITAMIN B-12) 500 MCG tablet Take 500 mcg by mouth once daily.      dexamethasone (DECADRON) 4 MG Tab Take the day before and the day after chemotherapy. 40 tablet 0    ferrous gluconate (FERGON) 324 MG tablet TK 1 T PO QD  3    hydrochlorothiazide (HYDRODIURIL) 25 MG tablet TK 1 T PO ONCE A DAY  3    lidocaine-prilocaine (EMLA) cream Apply topically as needed. 5 g 1    metoprolol succinate (TOPROL-XL) 50 MG 24 hr tablet TK 1/2 T PO QD  3    potassium chloride SA (K-DUR,KLOR-CON) 20 MEQ tablet TK 1 T PO  QD  3     No current facility-administered medications on file prior to visit.      Social History     Social History    Marital status:      Spouse name: N/A    Number of children: N/A    Years of education: N/A     Occupational History    Not on file.     Social History Main Topics    Smoking status: Never Smoker    Smokeless tobacco: Not on file    Alcohol use Yes      Comment: wine    Drug use: No    Sexual activity: Not on file     Other Topics Concern    Not on file     Social History Narrative    No narrative on file     Family History   Problem Relation Age of Onset    Heart disease Father     Breast cancer Sister      at age 52 or 53        Review of Systems   Constitutional: Negative  for activity change and unexpected weight change.   HENT: Negative for congestion and trouble swallowing.    Eyes: Negative for pain and redness.   Respiratory: Negative for cough and chest tightness.    Cardiovascular: Negative for chest pain, palpitations and leg swelling.   Gastrointestinal: Negative for abdominal distention, abdominal pain, blood in stool, constipation and nausea.   Endocrine: Negative.    Genitourinary: Negative for difficulty urinating and hematuria.   Musculoskeletal: Negative for joint swelling, myalgias and neck stiffness.        Pain from having scoliosis   Skin: Negative for color change and wound.   Neurological: Negative for dizziness and speech difficulty.   Hematological: Negative.  Does not bruise/bleed easily.   Psychiatric/Behavioral: Negative.  Negative for agitation. The patient is not nervous/anxious.      Objective:     /70 (BP Location: Right arm, Patient Position: Sitting, BP Method: Automatic)   Pulse 89   Temp 98.3 °F (36.8 °C) (Oral)   Wt 94.8 kg (209 lb)   LMP 04/03/2017   BMI 34.78 kg/m²     Physical Exam   Constitutional: She is oriented to person, place, and time. She appears well-developed and well-nourished.   HENT:   Head: Normocephalic and atraumatic.   Cardiovascular: Normal rate, regular rhythm and normal heart sounds.    Pulmonary/Chest: Effort normal and breath sounds normal. No respiratory distress. She exhibits no tenderness. Right breast exhibits no inverted nipple, no mass, no nipple discharge, no skin change and no tenderness. Left breast exhibits no inverted nipple, no skin change and no tenderness. Breasts are symmetrical.       Abdominal: Soft. Bowel sounds are normal. She exhibits no distension and no mass. There is no tenderness.   Musculoskeletal: She exhibits deformity.   Right clavicle more prominent than left clavicle at sternal edge   Neurological: She is alert and oriented to person, place, and time. She has normal reflexes.   Skin:  Skin is warm and dry.         Radiology review: Images personally reviewed by me in the clinic.   Mammogram:    Ultrasound: lobulated hypoechoic lesion measuring 1.75 x 1.96 cm.    Clinic US today:  L br 2OC 10CFN  2.36x1.54 cm mass with irregular borders.  L axilla with multiple visible mostly normal appearing nodes      US today:  LEFT LIMITED ULTRASOUND FINDINGS:  There is an irregular solid mass measuring 2.5 x 2.2 x 1.5 cm seen in the left  breast at 10 o'clock located 7 centimeters from the nipple.    Targeted ultrasound was performed in the left axilla and there is no evidence of  lymphadenopathy.     Impression     Solid mass in the left breast is a known malignancy.    The patient reports that no biopsy marker was placed at the time of biopsy and  no outside post-procedure mammogram was provided for review.  If neoadjuvant  chemotherapy is planned, pre-therapy placement of marker is recommended.    There is no evidence of left axillary adenopathy.    ACR BI-RADS Category 6: Known breast carcinoma      JUANCHO MAYFIELD M.D.         Assessment:       Invasive Mammary Carcinoma UOQ L breast s/p NACT  Plan:     Options for management were discussed with the patient and her family. We reviewed the existing data noting the equivalency of breast conserving surgery with radiation therapy and mastectomy. We also reviewed the guidelines of the National Comprehensive Cancer Network for Stage IIA breast carcinoma. We discussed the need for lumpectomy margins to be negative for carcinoma, the necessity for postoperative radiation therapy after breast conservation in most cases, the possibility of a failed or false negative sentinel lymph node biopsy and the potential need for complete lymphadenectomy for a failed or positive sentinel lymph node biopsy were fully discussed. In the setting of mastectomy, delayed or immediate reconstruction options are available and were discussed.     In the setting of lumpectomy,  radiation therapy would be recommended majority of the time.  The duration and treatment side effects were discussed with the patient.  This will coordinated with the radiation oncologist pending final pathology.    We also discussed the role of systemic therapy in the treatment of breast cancer.  We discussed that this is based on tumor biology and alana status and will be determined based on final pathology.  We discussed that if the cancer is hormone positive, endocrine therapy would be recommended in most cases and its use can reduce the risk of recurrence as well as improve survival. Side effects of treatment were briefly discussed. We also discussed the potential role for chemotherapy based on a number of factors such as tumor phenotype (ER+ vs. triple negative vs. [id number]+) and this would be determined in coordination with the medical oncologist.      She will see Dr. Sewell with plastics for evaluation of flap reconstruction.  Dr. Chandra is out of town for the next few weeks and would be unable to get her scheduled in an appropriate timing.  Dr. De Luna has also been out of town and she would really like the first available appointment.  Will obtain post NACT MRI  She is strongly considering bilateral mastectomies with reconstruction.  Will make final decision when she meets with plastic surgery.  We will put her on a course of antibiotics to assure there is no underlying infection with her development of breast swelling and history of untreated cellulitis.    Patient was educated on breast cancer, receptors, wire localization lumpectomy, mastectomy, sentinel lymph node mapping and biopsy, axillary lymph node dissection, reconstruction, breast prosthesis with post-mastectomy bra and radiation therapy. Patient was given patient information binder [facility].  All her questions were answered.    Total time spent with the patient: 60 minutes.  50 minutes of face to face  consultation and 10 minutes of chart review and coordination of care.

## 2017-06-26 NOTE — INTERVAL H&P NOTE
The patient has been examined and the H&P has been reviewed:    I concur with the findings and no changes have occurred since H&P was written.     She has taken a course of antibiotics which has improved the L breast swelling.  There is no erythema as was previously described by the patient about a month ago.  Overall this seems to have significantly improved although not completely resolved as there is some remaining heaviness of the breast.      Anesthesia/Surgery risks, benefits and alternative options discussed and understood by patient/family.          Active Hospital Problems    Diagnosis  POA    Personal history of malignant neoplasm of breast [Z85.3]  Not Applicable      Resolved Hospital Problems    Diagnosis Date Resolved POA   No resolved problems to display.

## 2017-06-26 NOTE — OP NOTE
Operative Note     Date: 6/26/2017    PRE-OP DIAGNOSIS: Left triple negative invasive mammary carcinoma      POST-OP DIAGNOSIS: Same    Procedure(s):  Left Total Mastectomy, Skin Sparing  SCOTT Flap reconstruction    ATTENDING SURGEON: Regina Rose MD     HOUSESTAFF SURGEON: Fritz Bowman MD    ANESTHESIA: General     OPERATIVE FINDINGS: Left total mastectomy.  Remaining tissue was soft.  Area of interest palpable within specimen    SPECIMENS: Left breast, marked short stitch superior and long stitch lateral    INDICATION FOR PROCEDURE: Ermelinda Verde is a 56 y.o. female with a history of triple negative invasive mammary carcinoma.  She underwent sentinel lymph node biopsy at the time of port placement, and these nodes are negative for carcinoma.  She has completed her neoadjuvant chemotherapy.    The patient has elected to undergo left total mastectomy with SCOTT flap reconstruction.  The patient was informed of the possible risks and complications of the procedure, including but not limited to anesthetic risks, bleeding, infection, and need for additional surgery.  The patient concurred with the proposed plan, and has given informed consent.    PROCEDURE IN DETAIL:  The site of surgery was properly noted/marked in the preoperative holding area.    The patient was then brought to the operating room and general anesthesia was induced.  She was placed in the supine position with left arm extended and right arm tucked. Perioperative antibiotics were administered consisting of Ancef and a time out was performed confirming the patient, site, and procedure.  The bilateral chest and axilla, as well as the abdomen, were then prepped and draped in the usual sterile fashion.    We then turned our attention to the left breast where a vertically oriented teardrop incision was fashioned to incorporate the nipple areolar complex.  The incision was made with a 10-blade and extended through the subcutaneous tissues with Bovie  electrocautery.  Skin flaps were raised to the clavicle superiorly.     We then proceeded to raise the remainder of the flaps to the lateral border of the sternum medially, to the inframammary fold inferiorly, and to the anterior border of the latissimus dorsi muscle laterally. The breast tissue was sharply excised off the chest wall taking care to incorporate the pectoralis fascia while leaving the serratus fascia behind.  The resulting mastectomy specimen was marked using a short stitch superiorly and long stitch laterally.  The breast was sent to pathology for permanent evaluation.      Hemostasis was then obtained.  The patient and operating room were turned over to Dr. Sewell and the Plastics team for SCOTT flap construction, which will be dictated separately.    Dr. Rose was present and scrubbed for the entire procedure.    ESTIMATED BLOOD LOSS: 30 cc.    DISPOSITION: The patient and operating room was over to Dr. Sewell and the Plastics team for SCOTT flap construction.

## 2017-06-27 LAB
ERYTHROCYTE [DISTWIDTH] IN BLOOD BY AUTOMATED COUNT: 15.6 %
HCT VFR BLD AUTO: 30.7 %
HGB BLD-MCNC: 9.7 G/DL
MCH RBC QN AUTO: 27.8 PG
MCHC RBC AUTO-ENTMCNC: 31.6 %
MCV RBC AUTO: 88 FL
PLATELET # BLD AUTO: 160 K/UL
PMV BLD AUTO: 9.2 FL
RBC # BLD AUTO: 3.49 M/UL
WBC # BLD AUTO: 9.54 K/UL

## 2017-06-27 PROCEDURE — 36415 COLL VENOUS BLD VENIPUNCTURE: CPT

## 2017-06-27 PROCEDURE — 25000003 PHARM REV CODE 250: Performed by: SURGERY

## 2017-06-27 PROCEDURE — 11000001 HC ACUTE MED/SURG PRIVATE ROOM

## 2017-06-27 PROCEDURE — 94761 N-INVAS EAR/PLS OXIMETRY MLT: CPT

## 2017-06-27 PROCEDURE — 63600175 PHARM REV CODE 636 W HCPCS: Performed by: SURGERY

## 2017-06-27 PROCEDURE — 85027 COMPLETE CBC AUTOMATED: CPT

## 2017-06-27 RX ADMIN — DOCUSATE SODIUM 100 MG: 100 CAPSULE, LIQUID FILLED ORAL at 08:06

## 2017-06-27 RX ADMIN — HYDROMORPHONE HYDROCHLORIDE 1 MG: 1 INJECTION, SOLUTION INTRAMUSCULAR; INTRAVENOUS; SUBCUTANEOUS at 02:06

## 2017-06-27 RX ADMIN — CEFAZOLIN SODIUM 2 G: 2 SOLUTION INTRAVENOUS at 05:06

## 2017-06-27 RX ADMIN — HYDROMORPHONE HYDROCHLORIDE 1 MG: 1 INJECTION, SOLUTION INTRAMUSCULAR; INTRAVENOUS; SUBCUTANEOUS at 07:06

## 2017-06-27 RX ADMIN — ONDANSETRON 4 MG: 2 INJECTION INTRAMUSCULAR; INTRAVENOUS at 03:06

## 2017-06-27 RX ADMIN — DEXTROSE MONOHYDRATE, SODIUM CHLORIDE, AND POTASSIUM CHLORIDE: 50; 4.5; 1.49 INJECTION, SOLUTION INTRAVENOUS at 06:06

## 2017-06-27 RX ADMIN — HEPARIN SODIUM 5000 UNITS: 5000 INJECTION, SOLUTION INTRAVENOUS; SUBCUTANEOUS at 03:06

## 2017-06-27 RX ADMIN — HYDROMORPHONE HYDROCHLORIDE 1 MG: 1 INJECTION, SOLUTION INTRAMUSCULAR; INTRAVENOUS; SUBCUTANEOUS at 09:06

## 2017-06-27 RX ADMIN — CEPHALEXIN 500 MG: 500 CAPSULE ORAL at 07:06

## 2017-06-27 RX ADMIN — HEPARIN SODIUM 5000 UNITS: 5000 INJECTION, SOLUTION INTRAVENOUS; SUBCUTANEOUS at 05:06

## 2017-06-27 RX ADMIN — HEPARIN SODIUM 5000 UNITS: 5000 INJECTION, SOLUTION INTRAVENOUS; SUBCUTANEOUS at 10:06

## 2017-06-27 NOTE — NURSING
Patient remains in ICU. Alvarez removed per MD order, patient due to void. Patient up in chair tolerating well. Zofran given per patient request. Flaps checks q2. Family updated with plan of care. Safety maintained. Will continue to monitor.

## 2017-06-27 NOTE — PLAN OF CARE
DC Planning:    Writer met with patient at bedside to discuss plan of care.     Family and  Júnior present as well. Patient reports living with spouse, and sees Dr. Reyna at [facility] for PCP.      asked writer if patient can be D/C with Home Health. Writer explained home-bound criteria for Home Health, but also encouraged  and patient to express request and concerns with MD team. Writer offered assistance if Home Health is ordered by MD.     No needs expressed, CM to follow and remain supportive.     06/27/17 0108   Discharge Assessment   Assessment Type Discharge Planning Assessment   Confirmed/corrected address and phone number on facesheet? Yes   Assessment information obtained from? Patient;Caregiver   Prior to hospitilization cognitive status: Alert/Oriented   Prior to hospitalization functional status: Independent   Current cognitive status: Alert/Oriented   Current Functional Status: Independent   Arrived From admitted as an inpatient   Lives With spouse   Able to Return to Prior Arrangements yes   Is patient able to care for self after discharge? Yes   How many people do you have in your home that can help with your care after discharge? 1   Who are your caregiver(s) and their phone number(s)? Júnior Ammon, , [phone]   Patient currently being followed by outpatient case management? No   Patient currently receives home health services? No   Does the patient currently use HME? No   Patient currently receives private duty nursing? N/A   Patient currently receives any other outside agency services? No   Equipment Currently Used at Home none   Do you have any problems affording any of your prescribed medications? No   Is the patient taking medications as prescribed? yes   Do you have any financial concerns preventing you from receiving the healthcare you need? No   Does the patient have transportation to healthcare appointments? Yes   Transportation Available car;family or friend will  provide   Discharge Plan A Home with family   Discharge Plan B Home Health   Patient/Family In Agreement With Plan yes

## 2017-06-27 NOTE — ANESTHESIA POSTPROCEDURE EVALUATION
"Anesthesia Post Evaluation    Patient: Ermelinda Verde    Procedure(s) Performed: Procedure(s) (LRB):  RECONSTRUCTION-BREAST/FLAP-SCOTT (Left)  MASTECTOMY (Left)    Final Anesthesia Type: general  Patient location during evaluation: PACU  Patient participation: Yes- Able to Participate  Level of consciousness: awake  Post-procedure vital signs: reviewed and stable  Pain management: adequate  Airway patency: patent  PONV status at discharge: No PONV  Anesthetic complications: no      Cardiovascular status: blood pressure returned to baseline and stable  Respiratory status: unassisted, spontaneous ventilation and nasal cannula  Hydration status: euvolemic  Follow-up not needed.        Visit Vitals  BP (!) 140/75 (BP Location: Right arm, Patient Position: Lying, BP Method: Automatic)   Pulse 75   Temp 36.4 °C (97.5 °F) (Oral)   Resp 16   Ht 5' 5" (1.651 m)   Wt 94.8 kg (209 lb)   LMP 04/08/2017   SpO2 100%   BMI 34.78 kg/m²       Pain/Joni Score: Pain Assessment Performed: Yes (6/26/2017  7:05 PM)  Presence of Pain: complains of pain/discomfort (6/26/2017  7:05 PM)  Pain Rating Prior to Med Admin: 5 (6/26/2017  7:35 PM)  Joni Score: 8 (6/26/2017  7:05 PM)      "

## 2017-06-28 PROCEDURE — 63600175 PHARM REV CODE 636 W HCPCS: Performed by: SURGERY

## 2017-06-28 PROCEDURE — 11000001 HC ACUTE MED/SURG PRIVATE ROOM

## 2017-06-28 PROCEDURE — 25000003 PHARM REV CODE 250: Performed by: SURGERY

## 2017-06-28 RX ADMIN — DOCUSATE SODIUM 100 MG: 100 CAPSULE, LIQUID FILLED ORAL at 08:06

## 2017-06-28 RX ADMIN — CEPHALEXIN 500 MG: 500 CAPSULE ORAL at 09:06

## 2017-06-28 RX ADMIN — DOCUSATE SODIUM 100 MG: 100 CAPSULE, LIQUID FILLED ORAL at 09:06

## 2017-06-28 RX ADMIN — HEPARIN SODIUM 5000 UNITS: 5000 INJECTION, SOLUTION INTRAVENOUS; SUBCUTANEOUS at 01:06

## 2017-06-28 RX ADMIN — CEPHALEXIN 500 MG: 500 CAPSULE ORAL at 12:06

## 2017-06-28 RX ADMIN — HYDROMORPHONE HYDROCHLORIDE 1 MG: 1 INJECTION, SOLUTION INTRAMUSCULAR; INTRAVENOUS; SUBCUTANEOUS at 12:06

## 2017-06-28 RX ADMIN — CEPHALEXIN 500 MG: 500 CAPSULE ORAL at 06:06

## 2017-06-28 RX ADMIN — HEPARIN SODIUM 5000 UNITS: 5000 INJECTION, SOLUTION INTRAVENOUS; SUBCUTANEOUS at 06:06

## 2017-06-28 RX ADMIN — CEPHALEXIN 500 MG: 500 CAPSULE ORAL at 11:06

## 2017-06-28 RX ADMIN — HEPARIN SODIUM 5000 UNITS: 5000 INJECTION, SOLUTION INTRAVENOUS; SUBCUTANEOUS at 09:06

## 2017-06-28 NOTE — PROGRESS NOTES
"Pt seen and examined yesterday morning and this morning.    Blood pressure 128/66, pulse 89, temperature 99.2 °F (37.3 °C), temperature source Oral, resp. rate 17, height 5' 5" (1.651 m), weight 87.8 kg (193 lb 9 oz), last menstrual period 04/08/2017, SpO2 96 %, not currently breastfeeding.      Flap with good color and signal  Donor site ok      A/P s/p Left SCOTT flap POD 2    1.) Flap checks Q4  2.) Heparin  3.) D/C tomorrow  "

## 2017-06-29 VITALS
WEIGHT: 193.56 LBS | BODY MASS INDEX: 32.25 KG/M2 | TEMPERATURE: 99 F | OXYGEN SATURATION: 97 % | SYSTOLIC BLOOD PRESSURE: 122 MMHG | RESPIRATION RATE: 18 BRPM | HEIGHT: 65 IN | HEART RATE: 77 BPM | DIASTOLIC BLOOD PRESSURE: 68 MMHG

## 2017-06-29 PROCEDURE — 25000003 PHARM REV CODE 250: Performed by: SURGERY

## 2017-06-29 RX ADMIN — CEPHALEXIN 500 MG: 500 CAPSULE ORAL at 08:06

## 2017-06-29 RX ADMIN — HEPARIN SODIUM 5000 UNITS: 5000 INJECTION, SOLUTION INTRAVENOUS; SUBCUTANEOUS at 06:06

## 2017-06-29 RX ADMIN — DOCUSATE SODIUM 100 MG: 100 CAPSULE, LIQUID FILLED ORAL at 08:06

## 2017-06-29 RX ADMIN — HYDROCODONE BITARTRATE AND ACETAMINOPHEN 1 TABLET: 5; 325 TABLET ORAL at 08:06

## 2017-06-29 RX ADMIN — CEPHALEXIN 500 MG: 500 CAPSULE ORAL at 04:06

## 2017-06-29 NOTE — DISCHARGE INSTRUCTIONS
Breast Reconstruction with Flap Procedures  A flap procedure uses your own tissue to form the shape of a breast. Two common types of flap procedures are the TRAM flap and the LD flap.  These surgeries are named based on what part of the body the tissue that is to be moved to the chest is taken from. Less often, tissue may be moved to the chest from the buttocks or thigh. Flap reconstruction surgeries leave you with two surgical wounds: the chest and the site where tissue was removed.    Your surgeon can help you decide whether to have reconstructive surgery and what type is best for you. You will be advised to wait if now is not the best time for you, for instance, if you need radiation to your chest after surgery.      TRAM flap         LD flap       TRAM flap  The TRAM (transverse rectus abdominis myocutaneous) flap uses stomach muscle, fat, and skin. The new breast feels soft to the touch. A flap of skin and fat is removed from the lower part of the stomach. Then the flap may be tunneled under the skin from the stomach to the mastectomy site, or it may be cut free and moved there. The flap is then formed into the shape of a breast.  LD flap  The LD (latissimus dorsi) flap uses back muscle, fat, and skin. The new breast feels soft to the touch. The flap of skin and fat is removed from the side of the body, over the ribs. The latissimus dorsi muscle is left attached to the flap. Then the flap is tunneled under the skin to the mastectomy site. There it is formed into the shape of a breast. In some cases, an implant is needed with this surgery.   Risks of flap reconstruction  Any type of surgery carries some risk. Patients differ in their anatomy and their ability to heal. Some problems related to breast reconstruction with a flap include:   · Bleeding  · Fluid collection in either of the surgical areas  · Anesthesia problems (problems with the medicines used to do the surgery)  · Bruising and swelling  · Implant  problems   · Infection  · Muscle weakness  · Scar tissue  · Increased complications in smokers   Note to smokers  Do not smoke at least 3 to 4 weeks before and 1 to 2 weeks after your procedure. Smoking can interfere with the healing process. Ask your healthcare provider for help quitting.    Date Last Reviewed: 11/4/2015  © 3520-8427 [employer]. [address] [place] [address], [place] [address]. All rights reserved. This information is not intended as a substitute for professional medical care. Always follow your healthcare professional's instructions.

## 2017-06-29 NOTE — PROGRESS NOTES
"POD 3    No acute changes.    Blood pressure (!) 153/82, pulse 92, temperature 98.4 °F (36.9 °C), temperature source Oral, resp. rate 17, height 5' 5" (1.651 m), weight 87.8 kg (193 lb 9 oz), last menstrual period 04/08/2017, SpO2 98 %, not currently breastfeeding.      NAD  RRR  Equal chest rise  Flap soft, good color, Drain removed    A/P s/p Left SCOTT flap, POD 3    1.) D/C today  "

## 2017-06-29 NOTE — PROGRESS NOTES
PT ACCOMPANIED BY RN AND TECH, VIA W/C TO ROOM ON UNIT , ASSISTED WITH SHOWERING, NEW BRA AND GIRDLE PUT ON AFTERWARDS, THEN RETURNED TO ICU VIA W/C

## 2017-06-29 NOTE — DISCHARGE SUMMARY
[facility]  Discharge Summary     Patient ID:  Ermelinda Verde  [id number]  56 y.o.  1960    Admit date: 6/26/2017    Discharge Date and Time:  06/29/2017 6:34 AM    Admitting Physician: Sukhjinder Sewell MD     Discharge Provider: Sukhjinder Sewell    Reason for Admission: H/O malignant neoplasm of breast [Z85.3]  Personal history of malignant neoplasm of breast [Z85.3]  Personal history of malignant neoplasm of breast [Z85.3]    Admission Condition: good    Procedures Performed: Procedure(s) (LRB):  RECONSTRUCTION-BREAST/FLAP-SCOTT (Left)  MASTECTOMY (Left)    Hospital Course Underwent above procedure without complications.     Consults: None    Significant Diagnostic Studies: None    Final Diagnoses:    Principal Problem: Personal history of malignant neoplasm of breast   Secondary Diagnoses: Acquired abesnce of left breast    Discharged Condition: good                                                                                      Disposition: Home or Self Care    Follow Up/Patient Instructions:     Next week with Dr. Sewell  May shower, No dressing needed. Wear bra and garment unless showering. No heavy lifting. No pressure to flap. Begin taking baby aspirin once a day tomorrow.    Medications:  Reconciled Home Medications:   Current Discharge Medication List      CONTINUE these medications which have NOT CHANGED    Details   amlodipine-benazepril (LOTREL) 10-40 mg per capsule TK 1 C PO QD  Refills: 3      metoprolol succinate (TOPROL-XL) 50 MG 24 hr tablet TK 1/2 T PO QD  Refills: 3      CALCIUM CARBONATE (CALCIUM 500 ORAL) Take by mouth.      cyanocobalamin (VITAMIN B-12) 500 MCG tablet Take 500 mcg by mouth once daily.      ferrous gluconate (FERGON) 324 MG tablet TK 1 T PO QD  Refills: 3      hydrochlorothiazide (HYDRODIURIL) 25 MG tablet TK 1 T PO ONCE A DAY  Refills: 3      potassium chloride SA (K-DUR,KLOR-CON) 20 MEQ tablet TK 1 T PO  QD  Refills: 3             Discharge  Procedure Orders  Diet general     Lifting restrictions     Call MD for:  temperature >100.4     Call MD for:  persistent nausea and vomiting or diarrhea     Call MD for:  severe uncontrolled pain     Call MD for:  redness, tenderness, or signs of infection (pain, swelling, redness, odor or green/yellow discharge around incision site)     Call MD for:  difficulty breathing or increased cough     Call MD for:  severe persistent headache     Call MD for:  worsening rash     Call MD for:  persistent dizziness, light-headedness, or visual disturbances     Call MD for:  increased confusion or weakness     No dressing needed       Follow-up Information     Sukhjinder Sewell MD.    Specialty:  Plastic Surgery  Why:  For wound re-check, For post-op visit  Contact information:  [address]  [address]  [employer] [facility]  [facility] [id number] [phone]                 Activity: no heavy lifting for 4 weeks  Diet: regular diet  Wound Care: keep wound clean and dry    Follow-up with Dr. Sewell next week.    Signed:  Sukhjinder Sewell  6/29/2017  6:34 AM

## 2017-06-29 NOTE — NURSING
DISCHARGED TO HOME, IV DC'D, GIVEN AVS WITH INSTRUCTIONS, INSTRUCTED FOR APPLYING GIRDLE AND BRA, DRAIN CARE AND RECORDING OUT PUT, LEFT VIA W/C ACCOMPANIED BY RN AND

## 2017-07-11 ENCOUNTER — OFFICE VISIT (OUTPATIENT)
Dept: SURGERY | Facility: CLINIC | Age: 57
End: 2017-07-11
Payer: COMMERCIAL

## 2017-07-11 ENCOUNTER — OFFICE VISIT (OUTPATIENT)
Dept: HEMATOLOGY/ONCOLOGY | Facility: CLINIC | Age: 57
End: 2017-07-11
Payer: COMMERCIAL

## 2017-07-11 VITALS
TEMPERATURE: 98 F | RESPIRATION RATE: 16 BRPM | WEIGHT: 199.06 LBS | SYSTOLIC BLOOD PRESSURE: 137 MMHG | BODY MASS INDEX: 33.13 KG/M2 | DIASTOLIC BLOOD PRESSURE: 75 MMHG | HEART RATE: 89 BPM

## 2017-07-11 VITALS
BODY MASS INDEX: 32.99 KG/M2 | TEMPERATURE: 98 F | HEART RATE: 98 BPM | SYSTOLIC BLOOD PRESSURE: 131 MMHG | HEIGHT: 65 IN | WEIGHT: 198 LBS | DIASTOLIC BLOOD PRESSURE: 68 MMHG

## 2017-07-11 DIAGNOSIS — Z17.1 MALIGNANT NEOPLASM OF UPPER-OUTER QUADRANT OF LEFT BREAST IN FEMALE, ESTROGEN RECEPTOR NEGATIVE: Primary | ICD-10-CM

## 2017-07-11 DIAGNOSIS — C50.412 MALIGNANT NEOPLASM OF UPPER-OUTER QUADRANT OF LEFT BREAST IN FEMALE, ESTROGEN RECEPTOR NEGATIVE: Primary | ICD-10-CM

## 2017-07-11 PROCEDURE — 99999 PR PBB SHADOW E&M-EST. PATIENT-LVL III: CPT | Mod: PBBFAC,,, | Performed by: SURGERY

## 2017-07-11 PROCEDURE — 99024 POSTOP FOLLOW-UP VISIT: CPT | Mod: S$GLB,,, | Performed by: SURGERY

## 2017-07-11 PROCEDURE — 99999 PR PBB SHADOW E&M-EST. PATIENT-LVL III: CPT | Mod: PBBFAC,,, | Performed by: INTERNAL MEDICINE

## 2017-07-11 PROCEDURE — 99214 OFFICE O/P EST MOD 30 MIN: CPT | Mod: S$GLB,,, | Performed by: INTERNAL MEDICINE

## 2017-07-11 NOTE — Clinical Note
Hepatic ultrasound, echocardiogram and labs.  We'll arrange for her to start dose dense Adriamycin every 2 weeks with a 2 Neulasta at that time

## 2017-07-11 NOTE — MEDICAL/APP STUDENT
"Breast Surgery  [facility]  Department of Surgery      REFERRING PROVIDER: No referring provider defined for this encounter.    Chief Complaint: Post-op Evaluation (Post-Op Left Breast Mastectomy)      Subjective:      Patient ID: Ermelinda Verde is a 56 y.o. female who presents s/p left skin sparing total mastectomy with SCOTT flap reconstruction on . Patient states she is doing well post operatively. Pain is well controlled. Patient states her left breast feels "heavy". Denies any fevers, chills, night sweets, CP, or SOB.     Due to the fact that she had residual malignancy, she will be receiving additional chemotherapy with single again Adriamycin for 4 cycles given a dose dense fashion.      Past Medical History:   Diagnosis Date    Cancer     breast    Hypertension      Past Surgical History:   Procedure Laterality Date    BREAST SURGERY  2017    left breast bx     SECTION      D&C      PORTACATH PLACEMENT      SENTINEL LYMPH NODE BIOPSY  2017    left    UTERINE FIBROID SURGERY       Current Outpatient Prescriptions on File Prior to Visit   Medication Sig Dispense Refill    amlodipine-benazepril (LOTREL) 10-40 mg per capsule TK 1 C PO QD  3    CALCIUM CARBONATE (CALCIUM 500 ORAL) Take by mouth.      cyanocobalamin (VITAMIN B-12) 500 MCG tablet Take 500 mcg by mouth once daily.      DOCUSATE CALCIUM (STOOL SOFTENER ORAL) Take by mouth.      hydrochlorothiazide (HYDRODIURIL) 25 MG tablet TK 1 T PO ONCE A DAY  3    metoprolol succinate (TOPROL-XL) 50 MG 24 hr tablet TK 1/2 T PO QD  3    potassium chloride SA (K-DUR,KLOR-CON) 20 MEQ tablet TK 1 T PO  QD  3    ferrous gluconate (FERGON) 324 MG tablet TK 1 T PO QD  3     No current facility-administered medications on file prior to visit.      Social History     Social History    Marital status:      Spouse name: N/A    Number of children: N/A    Years of education: N/A     Occupational History    Not on file. " "    Social History Main Topics    Smoking status: Never Smoker    Smokeless tobacco: Not on file    Alcohol use Yes      Comment: wine    Drug use: No    Sexual activity: Not on file     Other Topics Concern    Not on file     Social History Narrative    No narrative on file     Family History   Problem Relation Age of Onset    Heart disease Father     Breast cancer Sister      at age 52 or 53        Review of Systems  Objective:   /68 (BP Location: Right arm, Patient Position: Sitting, BP Method: Automatic)   Pulse 98   Temp 98.4 °F (36.9 °C) (Oral)   Ht 5' 5" (1.651 m)   Wt 89.8 kg (198 lb)   BMI 32.95 kg/m²     Physical Exam   Constitutional: She is oriented to person, place, and time. She appears well-developed and well-nourished.   Neck: Normal range of motion. Neck supple.   Cardiovascular: Normal rate, regular rhythm and normal heart sounds.    Pulmonary/Chest: Effort normal and breath sounds normal.       There is mild swelling of the left breast. Incision is clean/dry/intact. No erythema or drainage    Abdominal: Soft. Bowel sounds are normal.   Musculoskeletal: Normal range of motion.   Neurological: She is alert and oriented to person, place, and time.       Pathology:  1. LEFT MASTECTOMY SPECIMEN SHOWING 2 FOCI OF INVASIVE, GRADE 3 DUCT CARCINOMA WITH  NEGATIVE MARGINS PRESENT IN AN AREA OF PRIOR BIOPSY, SEE SYNOPTIC REPORT:  PROCEDURE: TOTAL MASTECTOMY  LYMPH NODE SAMPLING: NOT PERFORMED  SPECIMEN LATERALITY: LEFT  TUMOR SIZE: 14 MM, 1.5 MM  HISTOLOGIC TYPE: INVASIVE DUCT CARCINOMA  HISTOLOGIC GRADE: 3, 3, 2; GRADE 3  TUMOR FOCALITY: MULTIPLE FOCI, 2  DCIS: NOT IDENTIFIED  MACROSCOPIC AND MICROSCOPIC EXTENT OF TUMOR: NEGATIVE SKIN AND NIPPLE  MARGINS: ALL MARGINS OF RESECTION ARE NEGATIVE FOR INVASIVE CARCINOMA,THE CLOSEST  MARGIN IS THE SUPERIOR MARGIN AT 5 MM  IMMUNE RECEPTOR STATUS: MS : ER, ND AND HER-2 ARE ALL NEGATIVE  TUMOR STAGE:mpT1c Nx  NOTE: A SEPARATE 1.5 MM AREA " OF INVASIVE CARCINOMA IS IDENTIFIED IN SECTIONS OF BREAST  TISSUE ADJACENT TO THE LARGER CARCINOMA (SLIDE 1D)  2. SPECIMEN  Assessment:       Ms. Wadsworth is a 57 yo F s/p left total mastectomy skin sparing with DEIP flap reconstruction on 6/26.  Plan:     - Decision and reasoning behind addition chemotherapy was discussed with the patient as well as her final pathology report  - Return to clinic in 4 weeks after completion of 4 cycles of Adriamycin   - Patient informed to return before then if she needs anything or has any questions    Crystal Thao, MS4

## 2017-07-11 NOTE — PROGRESS NOTES
Subjective:       Patient ID: Ermelinda Verde is a 56 y.o. female.    Chief Complaint: Breast Cancer    HPI Mrs Verde is  a very pleasant 56-year-old -American female referred by  for a diagnosis of left breast cancer.  She is also a patient of .  She presents to continue neoadjuvant chemotherapy.  She is status post 4 cycles of  Wilbur-adjuvantTaxotere and Cytoxan completed  on 5/9/17.    On June 26 she underwent left mastectomy.  That revealed 2 foci of invasive high-grade carcinoma measuring 14 mm and 1.5 mm.  Margins were negative.        She developed a palpable abnormality in her left breast in January 2017 which she noted on self-examination.  A diagnostic mammogram on January 19 showed a greater than 1 cm nodule in the upper outer portion of left breast.  By ultrasound this was lobulated and hypoechoic measuring 1.75 x 1.51 x 1.96 cm.    On January 24, 2017 a core needle biopsy was performed which showed infiltrating ductal carcinoma, high grade.  The tumor was ER negative, OK negative, and HER-2 negative.  A follow-up ultrasound on December 6 showed 2.5 x 2.2 x 1.5 cm left breast mass.  There was no abnormality noted in the left axilla.     I recommended that she have a sentinel lymph node biopsy in order to help determine how much preoperative therapy would be indicated.  She underwent sentinel lymph node biopsy on February 22.  That showed 4 negative lymph nodes.    Review of Systems   Constitutional: Negative for activity change, fatigue, fever and unexpected weight change.   Respiratory: Negative for cough and shortness of breath.    Cardiovascular: Negative for chest pain.   Gastrointestinal: Positive for constipation. Negative for abdominal pain.   Musculoskeletal: Negative for back pain and neck pain.   Neurological: Positive for weakness. Negative for headaches.   Psychiatric/Behavioral: Negative for dysphoric mood. The patient is not nervous/anxious.        Objective:       Physical Exam   Constitutional: She is oriented to person, place, and time. She appears well-developed and well-nourished.   HENT:   Head: Normocephalic and atraumatic.   Mouth/Throat: Oropharynx is clear and moist. No oropharyngeal exudate.   Eyes: No scleral icterus.   Cardiovascular: Normal rate, regular rhythm and normal heart sounds.    Pulmonary/Chest: Effort normal and breath sounds normal. She has no wheezes. She has no rales. Right breast exhibits no mass, no nipple discharge and no skin change.       Abdominal: Soft. She exhibits no mass. There is no tenderness.   Lymphadenopathy:     She has no cervical adenopathy.     She has no axillary adenopathy.        Right: No supraclavicular adenopathy present.        Left: No supraclavicular adenopathy present.   Neurological: She is alert and oriented to person, place, and time.   Psychiatric: She has a normal mood and affect. Her behavior is normal. Thought content normal.       Assessment:       1. Malignant neoplasm of upper-outer quadrant of left breast in female, estrogen receptor negative        Plan:     Due to the fact that she had significant residual malignancy I recommended that she receive additional chemotherapy with single agent Adriamycin for 4 cycles given a dose dense fashion.

## 2017-07-13 NOTE — OP NOTE
DATE OF PROCEDURE:  06/26/2017    PREOPERATIVE DIAGNOSES:  1.  History of left breast cancer.  2.  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    OPERATIVE PROCEDURE:  Left immediate breast reconstruction with deep inferior   epigastric artery  flap.    SURGEON:  Sukhjinder Sewell M.D.    CO-SURGEON SURGEON:  Kamran Khoobehi, M.D.    ANESTHESIA:  General.    ESTIMATED BLOOD LOSS:  200 mL.    INTRAVASCULAR FLUIDS:  Per Anesthesia.    SPECIMENS:  None.    DRAINS:  One 15 round drain to left breast.  Two 15 round drains to the abdomen.    COMPLICATIONS:  None.    POSTOPERATIVE CONDITION:  Stable.    BRIEF INDICATIONS FOR SURGERY:  Ms. Verde is a pleasant female who was   diagnosed with left breast cancer.  The patient is to undergo left skin-sparing   mastectomy with immediate reconstruction.  Risks and benefits were discussed   with the patient.  The patient agreed to proceed.    OPERATIVE PROCEDURE IN DETAIL:  After risks and benefits were thoroughly   discussed with the patient, the patient expressed verbal understanding, informed   consent was obtained.  The patient was subsequently taken to the Operating   Room, placed supine on the operating room table.  After appropriate general   anesthesia was provided, the patient's abdomen and breasts were prepped and   draped in a standard surgical fashion.  Dr. Rose began performing the   patient's left skin-sparing mastectomy while Dr. Kamran Khoobehi and EPIFANIO began   elevating the SCOTT flaps.  Incision was then made with a skin knife to the lower   abdominal incision as well as the upper abdominal incision and the flaps were   raised in the lateral-to-medial direction.  We encountered three good lateral   row perforators and we elected to harvest the flap on these perforators.  The    dissection was then performed with the bipolar cautery.  An incision   was then made in the fascia and  dissection was performed through the   muscle down to  the underlying pedicle.  We dissected down until we had good   pedicle length.  Once we were happy with the flap elevation, attention was   directed towards the left mastectomy defect.  The internal mammary vessel   exposure was then performed and pectoralis muscle was elevated from lateral to   medial and the pericardium was scored and pericardium as well as the intercostal   muscles were removed.  The internal mammary vessels were then exposed and   dissected.  Next, the flaps had been transected and brought to the mastectomy   Wound and weighed 977 grams, the left breast tissue weighed 891 grams.  The flap   was then secured in the mastectomy defect and venous anastomosis was then   performed with a 2.5 mm  to 2 of the 5 pins as well as 2 of the internal   mammary vein.  This was performed end-to-end.  Next, the artery was then   anastomosed in a running 9-0 nylon fashion.  We did have a superficial vein with the good  vein and again,   this was hooked up with a 2.5 mm venous .  The area to be   deepithelialized was then marked and this was deepithelialized with Bovie   cautery.  The SPY visualization was then utilized to look at the profusion and   that was conclusion to the flap.  A 2-0 Vicryl was then used with shaping   sutures and the skin was then tailor-tacked and closed with 2-0 Quill suture.    Attention was then directed towards the abdominal donor site.  Fascial defect   was then closed with 0 Quill suture and the upper abdominal flap was then raised.  Plication was then performed with 0 Quill suture in two layers.  Exparel was then placed into the plication as well as the fascial   incisions and the lower abdominal incision.  The upper abdominal skin flap was   then secured to the lower abdominal incision with 2-0 Vicryl suture and the skin   was then tailor-tack with skin staples.  Abdominal closure was then performed   with 0 Quill for Lynn's fascia in the subcutaneous  tissue as well as 2-0 Quill   for subcuticular portion.  Two 15 round drains were placed and secured with a   4-0 Vicryl suture.  The new area for the umbilicus was then incised and was sewn   in with 3-0 Monocryl fascial sutures in 4 quadrants as well as 2-0 Quill for   subcuticular portion.  Then, 4-0 Vicryl was used as necessary for reinforcement   sutures.  Dermabond tape was placed in the lower abdominal incision, Xeroform to   the belly button and postoperative bra to the breast.  The sponge, needle, and   instrument counts were correct at the end of the case.  The patient tolerated   the procedure well and was transferred to Recovery Room in stable condition.          /nuzhat [id number] blank(s)        KARLEY/  dd: 06/28/2017 07:48:09 (CDT)  td: 07/13/2017 15:29:38 (CDT)  Doc ID   #[id number]  Job ID #[id number]    CC:

## 2017-07-15 NOTE — PROGRESS NOTES
"Breast Surgery  [facility]  Department of Surgery        REFERRING PROVIDER: Alfredo English     Chief Complaint: Post-op Evaluation (Post-Op Left Breast Mastectomy)        Subjective:      Patient ID: Ermelinda Verde is a 56 y.o. female who presents s/p left skin sparing total mastectomy with SCOTT flap reconstruction on . Patient states she is doing well post operatively. Pain is well controlled. Patient states her left breast feels "heavy". Denies any fevers, chills, night sweets, CP, or SOB.      Due to the fact that she had residual malignancy, she will be receiving additional chemotherapy with single agent Adriamycin for 4 cycles given in a dose dense fashion.             Past Medical History:   Diagnosis Date    Cancer       breast    Hypertension              Past Surgical History:   Procedure Laterality Date    BREAST SURGERY   2017     left breast bx     SECTION        D&C        PORTACATH PLACEMENT        SENTINEL LYMPH NODE BIOPSY   2017     left    UTERINE FIBROID SURGERY                 Current Outpatient Prescriptions on File Prior to Visit   Medication Sig Dispense Refill    amlodipine-benazepril (LOTREL) 10-40 mg per capsule TK 1 C PO QD   3    CALCIUM CARBONATE (CALCIUM 500 ORAL) Take by mouth.        cyanocobalamin (VITAMIN B-12) 500 MCG tablet Take 500 mcg by mouth once daily.        DOCUSATE CALCIUM (STOOL SOFTENER ORAL) Take by mouth.        hydrochlorothiazide (HYDRODIURIL) 25 MG tablet TK 1 T PO ONCE A DAY   3    metoprolol succinate (TOPROL-XL) 50 MG 24 hr tablet TK 1/2 T PO QD   3    potassium chloride SA (K-DUR,KLOR-CON) 20 MEQ tablet TK 1 T PO  QD   3    ferrous gluconate (FERGON) 324 MG tablet TK 1 T PO QD   3      No current facility-administered medications on file prior to visit.       Social History   Social History            Social History    Marital status:        Spouse name: N/A    Number of children: N/A    Years of education: " "N/A          Occupational History    Not on file.             Social History Main Topics    Smoking status: Never Smoker    Smokeless tobacco: Not on file    Alcohol use Yes         Comment: wine    Drug use: No    Sexual activity: Not on file           Other Topics Concern    Not on file          Social History Narrative    No narrative on file                Family History   Problem Relation Age of Onset    Heart disease Father      Breast cancer Sister         at age 52 or 53         Review of Systems  Objective:   /68 (BP Location: Right arm, Patient Position: Sitting, BP Method: Automatic)   Pulse 98   Temp 98.4 °F (36.9 °C) (Oral)   Ht 5' 5" (1.651 m)   Wt 89.8 kg (198 lb)   BMI 32.95 kg/m²      Physical Exam   Constitutional: She is oriented to person, place, and time. She appears well-developed and well-nourished.   Neck: Normal range of motion. Neck supple.   Cardiovascular: Normal rate, regular rhythm and normal heart sounds.    Pulmonary/Chest: Effort normal and breath sounds normal.       There is mild swelling of the left breast. Incision is clean/dry/intact. No erythema or drainage    Abdominal: Soft. Bowel sounds are normal.   Musculoskeletal: Normal range of motion.   Neurological: She is alert and oriented to person, place, and time.         Pathology:  1. LEFT MASTECTOMY SPECIMEN SHOWING 2 FOCI OF INVASIVE, GRADE 3 DUCT CARCINOMA WITH  NEGATIVE MARGINS PRESENT IN AN AREA OF PRIOR BIOPSY, SEE SYNOPTIC REPORT:  PROCEDURE: TOTAL MASTECTOMY  LYMPH NODE SAMPLING: NOT PERFORMED  SPECIMEN LATERALITY: LEFT  TUMOR SIZE: 14 MM, 1.5 MM  HISTOLOGIC TYPE: INVASIVE DUCT CARCINOMA  HISTOLOGIC GRADE: 3, 3, 2; GRADE 3  TUMOR FOCALITY: MULTIPLE FOCI, 2  DCIS: NOT IDENTIFIED  MACROSCOPIC AND MICROSCOPIC EXTENT OF TUMOR: NEGATIVE SKIN AND NIPPLE  MARGINS: ALL MARGINS OF RESECTION ARE NEGATIVE FOR INVASIVE CARCINOMA,THE CLOSEST  MARGIN IS THE SUPERIOR MARGIN AT 5 MM  IMMUNE RECEPTOR STATUS: MS " : ER, TN AND HER-2 ARE ALL NEGATIVE  TUMOR STAGE:ympT1c Nx  NOTE: A SEPARATE 1.5 MM AREA OF INVASIVE CARCINOMA IS IDENTIFIED IN SECTIONS OF BREAST  TISSUE ADJACENT TO THE LARGER CARCINOMA (SLIDE 1D)  2. SPECIMEN  Assessment:       Ms. Wadsworth is a 55 yo F s/p left total mastectomy skin sparing with DEIP flap reconstruction on 6/26 for [id number], Stage IA triple negative IDC  Plan:      - Decision and reasoning behind addition chemotherapy was discussed with the patient as well as her final pathology report  - Return to clinic in 4 months  - Patient informed to return before then if she needs anything or has any questions  - Continue follow up with medical oncology

## 2017-07-24 ENCOUNTER — HOSPITAL ENCOUNTER (OUTPATIENT)
Dept: CARDIOLOGY | Facility: CLINIC | Age: 57
Discharge: HOME | End: 2017-07-24
Payer: COMMERCIAL

## 2017-07-24 ENCOUNTER — HOSPITAL ENCOUNTER (OUTPATIENT)
Dept: RADIOLOGY | Facility: HOSPITAL | Age: 57
Discharge: HOME | End: 2017-07-24
Attending: INTERNAL MEDICINE
Payer: COMMERCIAL

## 2017-07-24 DIAGNOSIS — C50.412 MALIGNANT NEOPLASM OF UPPER-OUTER QUADRANT OF LEFT BREAST IN FEMALE, ESTROGEN RECEPTOR NEGATIVE: ICD-10-CM

## 2017-07-24 DIAGNOSIS — I36.9 NONRHEUMATIC TRICUSPID VALVE DISORDER: ICD-10-CM

## 2017-07-24 DIAGNOSIS — Z17.1 MALIGNANT NEOPLASM OF UPPER-OUTER QUADRANT OF LEFT BREAST IN FEMALE, ESTROGEN RECEPTOR NEGATIVE: ICD-10-CM

## 2017-07-24 LAB
DIASTOLIC DYSFUNCTION: NO
ESTIMATED PA SYSTOLIC PRESSURE: 23.43
GLOBAL PERICARDIAL EFFUSION: NORMAL
RETIRED EF AND QEF - SEE NOTES: 65 (ref 55–65)
TRICUSPID VALVE REGURGITATION: NORMAL

## 2017-07-24 PROCEDURE — 76705 ECHO EXAM OF ABDOMEN: CPT | Mod: TC

## 2017-07-24 PROCEDURE — 76705 ECHO EXAM OF ABDOMEN: CPT | Mod: 26,,, | Performed by: RADIOLOGY

## 2017-07-24 PROCEDURE — 0399T 2D ECHO WITH COLOR FLOW DOPPLER: CPT | Mod: S$GLB,,, | Performed by: INTERNAL MEDICINE

## 2017-07-24 PROCEDURE — 93306 TTE W/DOPPLER COMPLETE: CPT | Mod: S$GLB,,, | Performed by: INTERNAL MEDICINE

## 2017-07-25 ENCOUNTER — TELEPHONE (OUTPATIENT)
Dept: HEMATOLOGY/ONCOLOGY | Facility: CLINIC | Age: 57
End: 2017-07-25

## 2017-07-25 NOTE — TELEPHONE ENCOUNTER
----- Message from Alisa Weston sent at 7/25/2017 12:30 PM CDT -----  Contact: Pt Ermelinda Verde [phone]  Pt requesting a call back from the nurse to see if she can push her appt time til 9am due to driving distance.    Pt may be reached at [phone].    Thank you.    LC

## 2017-07-26 ENCOUNTER — OFFICE VISIT (OUTPATIENT)
Dept: HEMATOLOGY/ONCOLOGY | Facility: CLINIC | Age: 57
End: 2017-07-26
Payer: COMMERCIAL

## 2017-07-26 VITALS
DIASTOLIC BLOOD PRESSURE: 80 MMHG | RESPIRATION RATE: 16 BRPM | BODY MASS INDEX: 33.02 KG/M2 | WEIGHT: 198.44 LBS | HEART RATE: 92 BPM | SYSTOLIC BLOOD PRESSURE: 144 MMHG | TEMPERATURE: 100 F

## 2017-07-26 DIAGNOSIS — Z51.11 ENCOUNTER FOR ANTINEOPLASTIC CHEMOTHERAPY: ICD-10-CM

## 2017-07-26 DIAGNOSIS — C50.412 MALIGNANT NEOPLASM OF UPPER-OUTER QUADRANT OF LEFT BREAST IN FEMALE, ESTROGEN RECEPTOR NEGATIVE: Primary | ICD-10-CM

## 2017-07-26 DIAGNOSIS — Z17.1 MALIGNANT NEOPLASM OF UPPER-OUTER QUADRANT OF LEFT BREAST IN FEMALE, ESTROGEN RECEPTOR NEGATIVE: Primary | ICD-10-CM

## 2017-07-26 PROCEDURE — 99999 PR PBB SHADOW E&M-EST. PATIENT-LVL III: CPT | Mod: PBBFAC,,, | Performed by: INTERNAL MEDICINE

## 2017-07-26 PROCEDURE — 99212 OFFICE O/P EST SF 10 MIN: CPT | Mod: S$GLB,,, | Performed by: INTERNAL MEDICINE

## 2017-07-26 RX ORDER — ERYTHROMYCIN 5 MG/G
OINTMENT OPHTHALMIC
Refills: 0 | COMMUNITY
Start: 2017-07-20 | End: 2017-10-03

## 2017-07-26 RX ORDER — ONDANSETRON 4 MG/1
4 TABLET, ORALLY DISINTEGRATING ORAL EVERY 8 HOURS PRN
Qty: 20 TABLET | Refills: 3 | Status: SHIPPED | OUTPATIENT
Start: 2017-07-26 | End: 2017-08-02

## 2017-07-26 RX ORDER — DEXAMETHASONE 4 MG/1
TABLET ORAL
COMMUNITY
Start: 2017-07-25 | End: 2017-07-26 | Stop reason: SDUPTHER

## 2017-07-26 RX ORDER — DEXAMETHASONE 4 MG/1
TABLET ORAL
Qty: 40 TABLET | Refills: 0 | Status: SHIPPED | OUTPATIENT
Start: 2017-07-26 | End: 2017-10-03

## 2017-07-26 RX ORDER — METOCLOPRAMIDE 10 MG/1
30 TABLET ORAL 2 TIMES DAILY
Qty: 50 TABLET | Refills: 1 | Status: SHIPPED | OUTPATIENT
Start: 2017-07-26 | End: 2017-10-03

## 2017-07-26 NOTE — PROGRESS NOTES
Subjective:       Patient ID: Ermelinda Verde is a 56 y.o. female.    Chief Complaint: No chief complaint on file.    HPI Mrs Verde is  a very pleasant 56-year-old -American female referred by  for a diagnosis of left breast cancer.  She is also a patient of .  She presents to initiate adjuvant chemotherapy the Adriamycin and cytoxan.     Echocardiogram and labs are unremarkable other than hemoglobin 11.3.        She developed a palpable abnormality in her left breast in January 2017 which she noted on self-examination.  A diagnostic mammogram on January 19 showed a greater than 1 cm nodule in the upper outer portion of left breast.  By ultrasound this was lobulated and hypoechoic measuring 1.75 x 1.51 x 1.96 cm.     On January 24, 2017 a core needle biopsy was performed which showed infiltrating ductal carcinoma, high grade.  The tumor was ER negative, MS negative, and HER-2 negative.  A follow-up ultrasound on December 6 showed 2.5 x 2.2 x 1.5 cm left breast mass.  There was no abnormality noted in the left axilla.    She underwent sentinel lymph node biopsy on February 22.  That showed 4 negative lymph nodes.    She had 4 cycles of  Wilbur-adjuvantTaxotere and Cytoxan completed  on 5/9/17.     On June 26 she underwent left mastectomy.  That revealed 2 foci of invasive high-grade carcinoma measuring 14 mm and 1.5 mm.  Margins were negative.    Review of Systems   Constitutional: Negative for appetite change and unexpected weight change.   Eyes: Negative for visual disturbance.   Respiratory: Negative for cough and shortness of breath.    Cardiovascular: Negative for chest pain.   Gastrointestinal: Negative for abdominal pain and diarrhea.   Genitourinary: Negative for frequency.   Musculoskeletal: Negative for back pain.   Skin: Negative for rash.   Neurological: Negative for headaches.   Hematological: Negative for adenopathy.   Psychiatric/Behavioral: The patient is not nervous/anxious.         Objective:      Physical Exam    Assessment:       1. Malignant neoplasm of upper-outer quadrant of left breast in female, estrogen receptor negative    2. Encounter for antineoplastic chemotherapy        Plan:       I discussed side effects of chemotherapy and written informed consent was executed.    She will receive Adriamycin  every 2 weeks with day 2 Neulasta

## 2017-08-01 ENCOUNTER — TELEPHONE (OUTPATIENT)
Dept: SURGERY | Facility: CLINIC | Age: 57
End: 2017-08-01

## 2017-08-01 ENCOUNTER — INFUSION (OUTPATIENT)
Dept: INFUSION THERAPY | Facility: HOSPITAL | Age: 57
End: 2017-08-01
Attending: INTERNAL MEDICINE
Payer: COMMERCIAL

## 2017-08-01 ENCOUNTER — TELEPHONE (OUTPATIENT)
Dept: HEMATOLOGY/ONCOLOGY | Facility: CLINIC | Age: 57
End: 2017-08-01

## 2017-08-01 VITALS
BODY MASS INDEX: 33.06 KG/M2 | TEMPERATURE: 98 F | DIASTOLIC BLOOD PRESSURE: 68 MMHG | WEIGHT: 198.44 LBS | RESPIRATION RATE: 16 BRPM | HEART RATE: 89 BPM | HEIGHT: 65 IN | SYSTOLIC BLOOD PRESSURE: 117 MMHG

## 2017-08-01 DIAGNOSIS — Z17.1 MALIGNANT NEOPLASM OF UPPER-OUTER QUADRANT OF LEFT BREAST IN FEMALE, ESTROGEN RECEPTOR NEGATIVE: ICD-10-CM

## 2017-08-01 DIAGNOSIS — C50.412 MALIGNANT NEOPLASM OF UPPER-OUTER QUADRANT OF LEFT BREAST IN FEMALE, ESTROGEN RECEPTOR NEGATIVE: ICD-10-CM

## 2017-08-01 DIAGNOSIS — Z51.11 ENCOUNTER FOR ANTINEOPLASTIC CHEMOTHERAPY: Primary | ICD-10-CM

## 2017-08-01 PROCEDURE — 63600175 PHARM REV CODE 636 W HCPCS: Performed by: INTERNAL MEDICINE

## 2017-08-01 PROCEDURE — 96377 APPLICATON ON-BODY INJECTOR: CPT

## 2017-08-01 PROCEDURE — 25000003 PHARM REV CODE 250: Performed by: INTERNAL MEDICINE

## 2017-08-01 PROCEDURE — 96367 TX/PROPH/DG ADDL SEQ IV INF: CPT

## 2017-08-01 PROCEDURE — A4216 STERILE WATER/SALINE, 10 ML: HCPCS | Performed by: INTERNAL MEDICINE

## 2017-08-01 PROCEDURE — 96409 CHEMO IV PUSH SNGL DRUG: CPT

## 2017-08-01 RX ORDER — DOXORUBICIN HYDROCHLORIDE 2 MG/ML
60 INJECTION, SOLUTION INTRAVENOUS
Status: COMPLETED | OUTPATIENT
Start: 2017-08-01 | End: 2017-08-01

## 2017-08-01 RX ORDER — HEPARIN 100 UNIT/ML
500 SYRINGE INTRAVENOUS
Status: DISCONTINUED | OUTPATIENT
Start: 2017-08-01 | End: 2017-08-01 | Stop reason: HOSPADM

## 2017-08-01 RX ORDER — SODIUM CHLORIDE 0.9 % (FLUSH) 0.9 %
10 SYRINGE (ML) INJECTION
Status: DISCONTINUED | OUTPATIENT
Start: 2017-08-01 | End: 2017-08-01 | Stop reason: HOSPADM

## 2017-08-01 RX ADMIN — SODIUM CHLORIDE 150 MG: 9 INJECTION, SOLUTION INTRAVENOUS at 02:08

## 2017-08-01 RX ADMIN — HEPARIN 500 UNITS: 100 SYRINGE at 03:08

## 2017-08-01 RX ADMIN — SODIUM CHLORIDE: 0.9 INJECTION, SOLUTION INTRAVENOUS at 02:08

## 2017-08-01 RX ADMIN — PEGFILGRASTIM 6 MG: KIT SUBCUTANEOUS at 03:08

## 2017-08-01 RX ADMIN — DOXORUBICIN HYDROCHLORIDE 122 MG: 2 INJECTION, SOLUTION INTRAVENOUS at 03:08

## 2017-08-01 RX ADMIN — SODIUM CHLORIDE, PRESERVATIVE FREE 10 ML: 5 INJECTION INTRAVENOUS at 03:08

## 2017-08-01 RX ADMIN — DEXAMETHASONE SODIUM PHOSPHATE: 4 INJECTION, SOLUTION INTRAMUSCULAR; INTRAVENOUS at 02:08

## 2017-08-01 NOTE — TELEPHONE ENCOUNTER
Called patient and she wanted to know if her return to work date can be extended to the end of August. Patient is still on chemo. Per Dr cuellar he will be happy to extend. Patient will contact disability office and ask them the send us the paperwork. Fax number provided.

## 2017-08-01 NOTE — TELEPHONE ENCOUNTER
Spoke to Lavinia in HR at patient's company [employer]. Explained that patient is just trying to follow up on her disability paperwork, and that she will need her date of return extended to end of September due to additional chemotherapy after surgery. Lavinia is agreeable to help. States that the disability company I spoke to earlier (see previous telephone note) will handle the date change, but if I can send her updated information to document her need for additional time she will add it to the chart. This way we have documentation backing up the start date push-back. Will email documents to [email or web address] per her request.

## 2017-08-01 NOTE — PLAN OF CARE
Problem: Patient Care Overview  Goal: Plan of Care Review  Outcome: Ongoing (interventions implemented as appropriate)  Pt tolerated first adriamycin without adverse effects. VSS. Provided AVS & verbalized understanding of RTC date. DC with family ambulating independently.

## 2017-08-01 NOTE — TELEPHONE ENCOUNTER
Per patient request called disability department at [phone] and followed up on claim. Spoke to Laura, who confirmed patient is approved until August 25th. Explained to Laura that patient is going to be receiving further chemotherapy and will need to extend her disability. Gave regimen information to Laura, estimated completion would be Sept 12th if nothing was delayed, but stressed that chemotherapy often gets delayed and patient would mostly likely need 1-2 additional weeks following the 12th. Laura verbalized understanding, states that she will enter the information, often no additional proof is needed, but she will let the patient know if she requires any documentation.     Called patient as well and let her know about my conversation. 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 Told patient that Dr. Reyna's office may be better able to show the treatment plan for the chemotherapy, but gave her my number and encouraged her to call if she ran into any issues.

## 2017-08-01 NOTE — TELEPHONE ENCOUNTER
Patient requesting a return call. Patient need to discuss her time away from work. Patient can be contacted @ [phone]. Patient receiving her treatment today and she wish to speak with you before she leave.

## 2017-08-02 ENCOUNTER — TELEPHONE (OUTPATIENT)
Dept: SURGERY | Facility: CLINIC | Age: 57
End: 2017-08-02

## 2017-08-02 NOTE — TELEPHONE ENCOUNTER
Called to follow up with disability dept re: extending patient's return to work date. Spoke to Amada at [phone] who states that return to work date still says 8/25/17, however there is a note from my conversation yesterday with Laura regarding pushing it to the end of September. Stressed that we'd be happy to provide any additional information needed to make this go through. Amada placed note in patient's file with my direct number stating that I can be called if any additional records need to be sent.     Also called patient to update her on status.

## 2017-08-15 ENCOUNTER — INFUSION (OUTPATIENT)
Dept: INFUSION THERAPY | Facility: HOSPITAL | Age: 57
End: 2017-08-15
Attending: INTERNAL MEDICINE
Payer: COMMERCIAL

## 2017-08-15 ENCOUNTER — OFFICE VISIT (OUTPATIENT)
Dept: HEMATOLOGY/ONCOLOGY | Facility: CLINIC | Age: 57
End: 2017-08-15
Payer: COMMERCIAL

## 2017-08-15 VITALS
BODY MASS INDEX: 32.98 KG/M2 | TEMPERATURE: 99 F | HEART RATE: 91 BPM | RESPIRATION RATE: 18 BRPM | DIASTOLIC BLOOD PRESSURE: 79 MMHG | SYSTOLIC BLOOD PRESSURE: 146 MMHG | OXYGEN SATURATION: 99 % | WEIGHT: 198.19 LBS

## 2017-08-15 VITALS
SYSTOLIC BLOOD PRESSURE: 130 MMHG | DIASTOLIC BLOOD PRESSURE: 77 MMHG | BODY MASS INDEX: 32.99 KG/M2 | WEIGHT: 198 LBS | HEIGHT: 65 IN | TEMPERATURE: 98 F | RESPIRATION RATE: 18 BRPM | HEART RATE: 80 BPM

## 2017-08-15 DIAGNOSIS — Z51.11 ENCOUNTER FOR ANTINEOPLASTIC CHEMOTHERAPY: ICD-10-CM

## 2017-08-15 DIAGNOSIS — C50.412 MALIGNANT NEOPLASM OF UPPER-OUTER QUADRANT OF LEFT BREAST IN FEMALE, ESTROGEN RECEPTOR NEGATIVE: ICD-10-CM

## 2017-08-15 DIAGNOSIS — Z51.11 ENCOUNTER FOR ANTINEOPLASTIC CHEMOTHERAPY: Primary | ICD-10-CM

## 2017-08-15 DIAGNOSIS — Z17.1 MALIGNANT NEOPLASM OF UPPER-OUTER QUADRANT OF LEFT BREAST IN FEMALE, ESTROGEN RECEPTOR NEGATIVE: ICD-10-CM

## 2017-08-15 PROCEDURE — 96409 CHEMO IV PUSH SNGL DRUG: CPT

## 2017-08-15 PROCEDURE — 96377 APPLICATON ON-BODY INJECTOR: CPT

## 2017-08-15 PROCEDURE — 63600175 PHARM REV CODE 636 W HCPCS: Performed by: INTERNAL MEDICINE

## 2017-08-15 PROCEDURE — 3078F DIAST BP <80 MM HG: CPT | Mod: S$GLB,,, | Performed by: PHYSICIAN ASSISTANT

## 2017-08-15 PROCEDURE — 96367 TX/PROPH/DG ADDL SEQ IV INF: CPT

## 2017-08-15 PROCEDURE — 25000003 PHARM REV CODE 250: Performed by: INTERNAL MEDICINE

## 2017-08-15 PROCEDURE — 99214 OFFICE O/P EST MOD 30 MIN: CPT | Mod: S$GLB,,, | Performed by: PHYSICIAN ASSISTANT

## 2017-08-15 PROCEDURE — 3074F SYST BP LT 130 MM HG: CPT | Mod: S$GLB,,, | Performed by: PHYSICIAN ASSISTANT

## 2017-08-15 PROCEDURE — 99999 PR PBB SHADOW E&M-EST. PATIENT-LVL III: CPT | Mod: PBBFAC,,, | Performed by: PHYSICIAN ASSISTANT

## 2017-08-15 RX ORDER — HEPARIN 100 UNIT/ML
500 SYRINGE INTRAVENOUS
Status: CANCELLED | OUTPATIENT
Start: 2017-08-15

## 2017-08-15 RX ORDER — DOXORUBICIN HYDROCHLORIDE 2 MG/ML
60 INJECTION, SOLUTION INTRAVENOUS
Status: CANCELLED | OUTPATIENT
Start: 2017-08-15

## 2017-08-15 RX ORDER — HEPARIN 100 UNIT/ML
500 SYRINGE INTRAVENOUS
Status: DISCONTINUED | OUTPATIENT
Start: 2017-08-15 | End: 2017-08-15 | Stop reason: HOSPADM

## 2017-08-15 RX ORDER — SODIUM CHLORIDE 0.9 % (FLUSH) 0.9 %
10 SYRINGE (ML) INJECTION
Status: CANCELLED | OUTPATIENT
Start: 2017-08-15

## 2017-08-15 RX ORDER — DOXORUBICIN HYDROCHLORIDE 2 MG/ML
60 INJECTION, SOLUTION INTRAVENOUS
Status: COMPLETED | OUTPATIENT
Start: 2017-08-15 | End: 2017-08-15

## 2017-08-15 RX ORDER — SODIUM CHLORIDE 0.9 % (FLUSH) 0.9 %
10 SYRINGE (ML) INJECTION
Status: DISCONTINUED | OUTPATIENT
Start: 2017-08-15 | End: 2017-08-15 | Stop reason: HOSPADM

## 2017-08-15 RX ADMIN — DEXAMETHASONE SODIUM PHOSPHATE: 4 INJECTION, SOLUTION INTRAMUSCULAR; INTRAVENOUS at 03:08

## 2017-08-15 RX ADMIN — DOXORUBICIN HYDROCHLORIDE 122 MG: 2 INJECTION, SOLUTION INTRAVENOUS at 04:08

## 2017-08-15 RX ADMIN — PEGFILGRASTIM 6 MG: KIT SUBCUTANEOUS at 04:08

## 2017-08-15 RX ADMIN — HEPARIN 500 UNITS: 100 SYRINGE at 04:08

## 2017-08-15 RX ADMIN — SODIUM CHLORIDE: 0.9 INJECTION, SOLUTION INTRAVENOUS at 02:08

## 2017-08-15 RX ADMIN — SODIUM CHLORIDE 150 MG: 9 INJECTION, SOLUTION INTRAVENOUS at 03:08

## 2017-08-15 NOTE — PROGRESS NOTES
Subjective:       Patient ID: Ermelinda Verde is a 56 y.o. female.    Chief Complaint: Malignant neoplasm of upper-outer quadrant of left breast in    Mrs Verde is  a very pleasant 56-year-old -American female referred by  for a diagnosis of left breast cancer.  She is also a patient of .  She presents for adjuvant Adriamycin and cytoxan #2.     She tolerated the first cycle of AC well. She had some very mild nausea and joint aches/pains.  No fever, chills, shortness of breath.      Onc History:  She developed a palpable abnormality in her left breast in January 2017 which she noted on self-examination.  A diagnostic mammogram on January 19 showed a greater than 1 cm nodule in the upper outer portion of left breast.  By ultrasound this was lobulated and hypoechoic measuring 1.75 x 1.51 x 1.96 cm.     On January 24, 2017 a core needle biopsy was performed which showed infiltrating ductal carcinoma, high grade.  The tumor was ER negative, SD negative, and HER-2 negative.  A follow-up ultrasound on December 6 showed 2.5 x 2.2 x 1.5 cm left breast mass.  There was no abnormality noted in the left axilla.     She underwent sentinel lymph node biopsy on February 22.  That showed 4 negative lymph nodes.     She had 4 cycles of  Wilbur-adjuvantTaxotere and Cytoxan completed  on 5/9/17.     On June 26 she underwent left mastectomy.  That revealed 2 foci of invasive high-grade carcinoma measuring 14 mm and 1.5 mm.  Margins were negative.      Review of Systems   Constitutional: Negative for appetite change and unexpected weight change.   HENT: Negative for congestion, mouth sores and trouble swallowing.    Eyes: Negative for visual disturbance.   Respiratory: Negative for cough and shortness of breath.    Cardiovascular: Negative for chest pain, palpitations and leg swelling.   Gastrointestinal: Negative for abdominal distention, abdominal pain, diarrhea, nausea and vomiting.   Genitourinary:  Negative for frequency.   Musculoskeletal: Negative for back pain.   Skin: Negative for pallor and rash.   Neurological: Negative for headaches.   Hematological: Negative for adenopathy.   Psychiatric/Behavioral: Negative for decreased concentration. The patient is not nervous/anxious.        Objective:      Physical Exam   Constitutional: She is oriented to person, place, and time. She appears well-developed and well-nourished. No distress.   ECOG 0  Presents with      HENT:   Head: Normocephalic.   Mouth/Throat: Oropharynx is clear and moist. No oropharyngeal exudate.   Eyes: Conjunctivae are normal. Pupils are equal, round, and reactive to light. No scleral icterus.   Neck: Normal range of motion. Neck supple. No thyromegaly present.   Cardiovascular: Normal rate and regular rhythm.    Pulmonary/Chest: Effort normal and breath sounds normal. No respiratory distress.   Right breast without mass, nodule or skin changes. Left breast without mass, nodule or skin changes. I am unable to palpate any lymphadenopathy.    Abdominal: Soft. Bowel sounds are normal. She exhibits no distension and no mass. There is no tenderness.   Musculoskeletal: Normal range of motion. She exhibits no edema or tenderness.   No spinal or paraspinal tenderness to palpation     Lymphadenopathy:     She has no cervical adenopathy.   Neurological: She is alert and oriented to person, place, and time. No cranial nerve deficit.   Skin: Skin is warm and dry.   Psychiatric: She has a normal mood and affect. Her behavior is normal. Thought content normal.   Vitals reviewed.      Assessment:       1. Encounter for antineoplastic chemotherapy    2. Malignant neoplasm of upper-outer quadrant of left breast in female, estrogen receptor negative        Plan:       Continue with AC #2 today, and to go home with Neulasta on-pro.  Return in two weeks with labs in anticipation of AC #3.

## 2017-08-15 NOTE — PLAN OF CARE
Problem: Patient Care Overview  Goal: Plan of Care Review  Outcome: Ongoing (interventions implemented as appropriate)  Pt tolerated adriamycin iv push without issue, avs given, pt needs to call for appt. , pt aware, obi placed to abdomen activated green light flashing, no distress noted upon d./c to home with spouse

## 2017-08-15 NOTE — PLAN OF CARE
Problem: Chemotherapy Effects (Adult)  Goal: Signs and Symptoms of Listed Potential Problems Will be Absent, Minimized or Managed (Chemotherapy Effects)  Signs and symptoms of listed potential problems will be absent, minimized or managed by discharge/transition of care (reference Chemotherapy Effects (Adult) CPG).   Outcome: Ongoing (interventions implemented as appropriate)  Pt here for adriamycin iv push, hx, meds, allergies reviewed, reviewed treatment plan with pt, pt verbalized understanding, reclined in chair, continue to monitor

## 2017-08-29 ENCOUNTER — OFFICE VISIT (OUTPATIENT)
Dept: HEMATOLOGY/ONCOLOGY | Facility: CLINIC | Age: 57
End: 2017-08-29
Payer: COMMERCIAL

## 2017-08-29 ENCOUNTER — LAB VISIT (OUTPATIENT)
Dept: LAB | Facility: HOSPITAL | Age: 57
End: 2017-08-29
Attending: INTERNAL MEDICINE
Payer: COMMERCIAL

## 2017-08-29 ENCOUNTER — INFUSION (OUTPATIENT)
Dept: INFUSION THERAPY | Facility: HOSPITAL | Age: 57
End: 2017-08-29
Attending: INTERNAL MEDICINE
Payer: COMMERCIAL

## 2017-08-29 VITALS
OXYGEN SATURATION: 99 % | HEART RATE: 93 BPM | DIASTOLIC BLOOD PRESSURE: 88 MMHG | RESPIRATION RATE: 18 BRPM | TEMPERATURE: 98 F | BODY MASS INDEX: 32.65 KG/M2 | WEIGHT: 196.19 LBS | SYSTOLIC BLOOD PRESSURE: 159 MMHG

## 2017-08-29 VITALS
SYSTOLIC BLOOD PRESSURE: 149 MMHG | RESPIRATION RATE: 17 BRPM | DIASTOLIC BLOOD PRESSURE: 86 MMHG | TEMPERATURE: 98 F | HEART RATE: 79 BPM

## 2017-08-29 DIAGNOSIS — C50.412 MALIGNANT NEOPLASM OF UPPER-OUTER QUADRANT OF LEFT BREAST IN FEMALE, ESTROGEN RECEPTOR NEGATIVE: ICD-10-CM

## 2017-08-29 DIAGNOSIS — C50.412 MALIGNANT NEOPLASM OF UPPER-OUTER QUADRANT OF LEFT BREAST IN FEMALE, ESTROGEN RECEPTOR NEGATIVE: Primary | ICD-10-CM

## 2017-08-29 DIAGNOSIS — Z51.11 ENCOUNTER FOR ANTINEOPLASTIC CHEMOTHERAPY: ICD-10-CM

## 2017-08-29 DIAGNOSIS — Z17.1 MALIGNANT NEOPLASM OF UPPER-OUTER QUADRANT OF LEFT BREAST IN FEMALE, ESTROGEN RECEPTOR NEGATIVE: ICD-10-CM

## 2017-08-29 DIAGNOSIS — Z17.1 MALIGNANT NEOPLASM OF UPPER-OUTER QUADRANT OF LEFT BREAST IN FEMALE, ESTROGEN RECEPTOR NEGATIVE: Primary | ICD-10-CM

## 2017-08-29 DIAGNOSIS — Z51.11 ENCOUNTER FOR ANTINEOPLASTIC CHEMOTHERAPY: Primary | ICD-10-CM

## 2017-08-29 LAB
ALBUMIN SERPL BCP-MCNC: 3.4 G/DL
ALP SERPL-CCNC: 126 U/L
ALT SERPL W/O P-5'-P-CCNC: 11 U/L
ANION GAP SERPL CALC-SCNC: 7 MMOL/L
AST SERPL-CCNC: 16 U/L
BILIRUB SERPL-MCNC: 0.1 MG/DL
BUN SERPL-MCNC: 6 MG/DL
CALCIUM SERPL-MCNC: 9.2 MG/DL
CHLORIDE SERPL-SCNC: 107 MMOL/L
CO2 SERPL-SCNC: 27 MMOL/L
CREAT SERPL-MCNC: 0.8 MG/DL
ERYTHROCYTE [DISTWIDTH] IN BLOOD BY AUTOMATED COUNT: 16.3 %
EST. GFR  (AFRICAN AMERICAN): >60 ML/MIN/1.73 M^2
EST. GFR  (NON AFRICAN AMERICAN): >60 ML/MIN/1.73 M^2
GLUCOSE SERPL-MCNC: 81 MG/DL
HCT VFR BLD AUTO: 35 %
HGB BLD-MCNC: 11.2 G/DL
MCH RBC QN AUTO: 25 PG
MCHC RBC AUTO-ENTMCNC: 32 G/DL
MCV RBC AUTO: 78 FL
NEUTROPHILS # BLD AUTO: 7.8 K/UL
PLATELET # BLD AUTO: 266 K/UL
PMV BLD AUTO: 8.9 FL
POTASSIUM SERPL-SCNC: 3.8 MMOL/L
PROT SERPL-MCNC: 7.4 G/DL
RBC # BLD AUTO: 4.48 M/UL
SODIUM SERPL-SCNC: 141 MMOL/L
WBC # BLD AUTO: 10.76 K/UL

## 2017-08-29 PROCEDURE — 3074F SYST BP LT 130 MM HG: CPT | Mod: S$GLB,,, | Performed by: PHYSICIAN ASSISTANT

## 2017-08-29 PROCEDURE — 63600175 PHARM REV CODE 636 W HCPCS: Performed by: INTERNAL MEDICINE

## 2017-08-29 PROCEDURE — 36415 COLL VENOUS BLD VENIPUNCTURE: CPT

## 2017-08-29 PROCEDURE — 96367 TX/PROPH/DG ADDL SEQ IV INF: CPT

## 2017-08-29 PROCEDURE — 3008F BODY MASS INDEX DOCD: CPT | Mod: S$GLB,,, | Performed by: PHYSICIAN ASSISTANT

## 2017-08-29 PROCEDURE — A4216 STERILE WATER/SALINE, 10 ML: HCPCS | Performed by: INTERNAL MEDICINE

## 2017-08-29 PROCEDURE — 3079F DIAST BP 80-89 MM HG: CPT | Mod: S$GLB,,, | Performed by: PHYSICIAN ASSISTANT

## 2017-08-29 PROCEDURE — 96409 CHEMO IV PUSH SNGL DRUG: CPT

## 2017-08-29 PROCEDURE — 99214 OFFICE O/P EST MOD 30 MIN: CPT | Mod: S$GLB,,, | Performed by: PHYSICIAN ASSISTANT

## 2017-08-29 PROCEDURE — 80053 COMPREHEN METABOLIC PANEL: CPT

## 2017-08-29 PROCEDURE — 96377 APPLICATON ON-BODY INJECTOR: CPT

## 2017-08-29 PROCEDURE — 25000003 PHARM REV CODE 250: Performed by: INTERNAL MEDICINE

## 2017-08-29 PROCEDURE — 99999 PR PBB SHADOW E&M-EST. PATIENT-LVL III: CPT | Mod: PBBFAC,,, | Performed by: PHYSICIAN ASSISTANT

## 2017-08-29 PROCEDURE — 85027 COMPLETE CBC AUTOMATED: CPT

## 2017-08-29 RX ORDER — HEPARIN 100 UNIT/ML
500 SYRINGE INTRAVENOUS
Status: DISCONTINUED | OUTPATIENT
Start: 2017-08-29 | End: 2017-08-29 | Stop reason: HOSPADM

## 2017-08-29 RX ORDER — DOXORUBICIN HYDROCHLORIDE 2 MG/ML
60 INJECTION, SOLUTION INTRAVENOUS
Status: COMPLETED | OUTPATIENT
Start: 2017-08-29 | End: 2017-08-29

## 2017-08-29 RX ORDER — HEPARIN 100 UNIT/ML
500 SYRINGE INTRAVENOUS
Status: CANCELLED | OUTPATIENT
Start: 2017-08-29

## 2017-08-29 RX ORDER — SODIUM CHLORIDE 0.9 % (FLUSH) 0.9 %
10 SYRINGE (ML) INJECTION
Status: CANCELLED | OUTPATIENT
Start: 2017-08-29

## 2017-08-29 RX ORDER — SODIUM CHLORIDE 0.9 % (FLUSH) 0.9 %
10 SYRINGE (ML) INJECTION
Status: DISCONTINUED | OUTPATIENT
Start: 2017-08-29 | End: 2017-08-29 | Stop reason: HOSPADM

## 2017-08-29 RX ORDER — DOXORUBICIN HYDROCHLORIDE 2 MG/ML
60 INJECTION, SOLUTION INTRAVENOUS
Status: CANCELLED | OUTPATIENT
Start: 2017-08-29

## 2017-08-29 RX ADMIN — DEXAMETHASONE SODIUM PHOSPHATE: 4 INJECTION, SOLUTION INTRAMUSCULAR; INTRAVENOUS at 02:08

## 2017-08-29 RX ADMIN — DOXORUBICIN HYDROCHLORIDE 122 MG: 2 INJECTION, SOLUTION INTRAVENOUS at 03:08

## 2017-08-29 RX ADMIN — SODIUM CHLORIDE 150 MG: 9 INJECTION, SOLUTION INTRAVENOUS at 03:08

## 2017-08-29 RX ADMIN — SODIUM CHLORIDE, PRESERVATIVE FREE 10 ML: 5 INJECTION INTRAVENOUS at 04:08

## 2017-08-29 RX ADMIN — PEGFILGRASTIM 6 MG: KIT SUBCUTANEOUS at 04:08

## 2017-08-29 RX ADMIN — HEPARIN 500 UNITS: 100 SYRINGE at 04:08

## 2017-08-29 RX ADMIN — SODIUM CHLORIDE: 0.9 INJECTION, SOLUTION INTRAVENOUS at 02:08

## 2017-08-29 NOTE — PROGRESS NOTES
Subjective:       Patient ID: Ermelinda Verde is a 56 y.o. female.    Chief Complaint: No chief complaint on file.    Mrs Verde is  a very pleasant 56-year-old -American female referred by  for a diagnosis of left breast cancer.  She is also a patient of .  She presents for adjuvant Adriamycin #3, she is receiving single agent adriamycin as she received wilbur-adjuvant TC X 4.      She tolerated the first two cycles of AC well. She had some very mild nausea and joint aches/pains.  No fever, chills, shortness of breath.   Some mucositis that is controlled with saltwater rinses.  No shortness of breath.  Tolerating neulasta onpro well.       Onc History:  She developed a palpable abnormality in her left breast in January 2017 which she noted on self-examination.  A diagnostic mammogram on January 19 showed a greater than 1 cm nodule in the upper outer portion of left breast.  By ultrasound this was lobulated and hypoechoic measuring 1.75 x 1.51 x 1.96 cm.     On January 24, 2017 a core needle biopsy was performed which showed infiltrating ductal carcinoma, high grade.  The tumor was ER negative, OK negative, and HER-2 negative.  A follow-up ultrasound on December 6 showed 2.5 x 2.2 x 1.5 cm left breast mass.  There was no abnormality noted in the left axilla.     She underwent sentinel lymph node biopsy on February 22.  That showed 4 negative lymph nodes.     She had 4 cycles of  Wilbur-adjuvantTaxotere and Cytoxan completed  on 5/9/17.     On June 26 she underwent left mastectomy.  That revealed 2 foci of invasive high-grade carcinoma measuring 14 mm and 1.5 mm.  Margins were negative.      Review of Systems   Constitutional: Negative for activity change, appetite change, chills, diaphoresis, fatigue, fever and unexpected weight change.   HENT: Positive for mouth sores (controlled). Negative for congestion and trouble swallowing.    Eyes: Negative for visual disturbance.   Respiratory: Negative  for cough and shortness of breath.    Cardiovascular: Negative for chest pain, palpitations and leg swelling.   Gastrointestinal: Positive for nausea (mild and controlled with anti-emetics). Negative for abdominal distention, abdominal pain, diarrhea and vomiting.   Genitourinary: Negative for dysuria and frequency.   Musculoskeletal: Negative for back pain.   Skin: Negative for pallor and rash.   Neurological: Positive for numbness (residual from past taxotere, mild). Negative for headaches.   Hematological: Negative for adenopathy.   Psychiatric/Behavioral: Negative for decreased concentration. The patient is not nervous/anxious.        Objective:      Physical Exam   Constitutional: She is oriented to person, place, and time. She appears well-developed and well-nourished. No distress.   ECOG 0  Presents with      HENT:   Head: Normocephalic.   Mouth/Throat: Oropharynx is clear and moist. No oropharyngeal exudate.   Eyes: Conjunctivae are normal. Pupils are equal, round, and reactive to light. No scleral icterus.   Neck: Normal range of motion. Neck supple. No thyromegaly present.   Cardiovascular: Normal rate and regular rhythm.    Pulmonary/Chest: Effort normal and breath sounds normal. No respiratory distress.   Right breast without mass, nodule or skin changes. Left breast without mass, nodule or skin changes. No axillary or supraclavicular adenopathy   Abdominal: Soft. Bowel sounds are normal. She exhibits no distension and no mass. There is no tenderness.   Musculoskeletal: Normal range of motion. She exhibits no edema or tenderness.   No spinal or paraspinal tenderness to palpation     Lymphadenopathy:     She has no cervical adenopathy.   Neurological: She is alert and oriented to person, place, and time. No cranial nerve deficit.   Skin: Skin is warm and dry.   Psychiatric: She has a normal mood and affect. Her behavior is normal. Thought content normal.   Vitals reviewed.      Assessment:       1.  Malignant neoplasm of upper-outer quadrant of left breast in female, estrogen receptor negative    2. Encounter for antineoplastic chemotherapy        Plan:       Proceed with adjuvant single agent Adriamycin #3 and she will receive neulasta Onpro.  Return in two weeks with labs in anticipation of 4th and final cycle of Adriamycin.  All questions answered to satisfaction of patient and her .

## 2017-08-29 NOTE — PLAN OF CARE
Problem: Patient Care Overview  Goal: Individualization & Mutuality  Outcome: Ongoing (interventions implemented as appropriate)  Labs , hx, and medications reviewed. Assessment completed. Discussed plan of care with patient. Patient in agreement. Chair reclined and warm blanket and snack offered.

## 2017-09-11 ENCOUNTER — TELEPHONE (OUTPATIENT)
Dept: SURGERY | Facility: CLINIC | Age: 57
End: 2017-09-11

## 2017-09-11 NOTE — TELEPHONE ENCOUNTER
----- Message from Diego Evans RN sent at 9/11/2017 10:18 AM CDT -----  Marcela,       Can you let pt know what was dicussed when you were called about her disability? I have not filled out any further forms for pt since the ones in media when she was to be released from a surgical standpoint on 8/1/17. Thanks, Diego  ----- Message -----  From: Bishop Epstein  Sent: 9/11/2017   9:53 AM  To: Milton Tapia Staff    Diego,    Pt has questions regarding how much time she has left off from work. Pt said you filled out some paperwork for her a few weeks ago and she just wants to check the time. Please call pt at [phone]

## 2017-09-11 NOTE — TELEPHONE ENCOUNTER
Called patient and offered to follow up with disability claim that was updated back on 8/2/17. Called claims dept and spoke to Belkys, who states that patient is currently approved through tomorrow, 9/12. Discussed my previous conversation with the claims dept back on 8/2, but she states they have since sent patient and employee a request for further updated information, and haven't received anything.     Faxed updated information to [phone] per Belk[employer]'s request. Also wrote on sheet that patient has not finished chemotherapy yet, and will need remainder of month to recover. Spoke to patient again, who states she never received a request for more information. She will call the claims department on Wednesday to check and make sure they have update her status. I will also call tomorrow morning and make sure my information was received.

## 2017-09-12 ENCOUNTER — TELEPHONE (OUTPATIENT)
Dept: SURGERY | Facility: CLINIC | Age: 57
End: 2017-09-12

## 2017-09-12 ENCOUNTER — OFFICE VISIT (OUTPATIENT)
Dept: HEMATOLOGY/ONCOLOGY | Facility: CLINIC | Age: 57
End: 2017-09-12
Payer: COMMERCIAL

## 2017-09-12 ENCOUNTER — LAB VISIT (OUTPATIENT)
Dept: LAB | Facility: HOSPITAL | Age: 57
End: 2017-09-12
Attending: INTERNAL MEDICINE
Payer: COMMERCIAL

## 2017-09-12 ENCOUNTER — INFUSION (OUTPATIENT)
Dept: INFUSION THERAPY | Facility: HOSPITAL | Age: 57
End: 2017-09-12
Attending: INTERNAL MEDICINE
Payer: COMMERCIAL

## 2017-09-12 VITALS
WEIGHT: 195.75 LBS | SYSTOLIC BLOOD PRESSURE: 127 MMHG | TEMPERATURE: 98 F | DIASTOLIC BLOOD PRESSURE: 81 MMHG | RESPIRATION RATE: 18 BRPM | HEART RATE: 93 BPM | HEIGHT: 65 IN | BODY MASS INDEX: 32.61 KG/M2

## 2017-09-12 VITALS
BODY MASS INDEX: 32.54 KG/M2 | DIASTOLIC BLOOD PRESSURE: 88 MMHG | HEART RATE: 101 BPM | WEIGHT: 195.56 LBS | RESPIRATION RATE: 16 BRPM | TEMPERATURE: 99 F | SYSTOLIC BLOOD PRESSURE: 136 MMHG

## 2017-09-12 DIAGNOSIS — Z51.11 ENCOUNTER FOR ANTINEOPLASTIC CHEMOTHERAPY: Primary | ICD-10-CM

## 2017-09-12 DIAGNOSIS — Z17.1 MALIGNANT NEOPLASM OF UPPER-OUTER QUADRANT OF LEFT BREAST IN FEMALE, ESTROGEN RECEPTOR NEGATIVE: Primary | ICD-10-CM

## 2017-09-12 DIAGNOSIS — Z17.1 MALIGNANT NEOPLASM OF UPPER-OUTER QUADRANT OF LEFT BREAST IN FEMALE, ESTROGEN RECEPTOR NEGATIVE: ICD-10-CM

## 2017-09-12 DIAGNOSIS — C50.412 MALIGNANT NEOPLASM OF UPPER-OUTER QUADRANT OF LEFT BREAST IN FEMALE, ESTROGEN RECEPTOR NEGATIVE: Primary | ICD-10-CM

## 2017-09-12 DIAGNOSIS — Z51.11 ENCOUNTER FOR ANTINEOPLASTIC CHEMOTHERAPY: ICD-10-CM

## 2017-09-12 DIAGNOSIS — C50.412 MALIGNANT NEOPLASM OF UPPER-OUTER QUADRANT OF LEFT BREAST IN FEMALE, ESTROGEN RECEPTOR NEGATIVE: ICD-10-CM

## 2017-09-12 LAB
ALBUMIN SERPL BCP-MCNC: 3.4 G/DL
ALP SERPL-CCNC: 119 U/L
ALT SERPL W/O P-5'-P-CCNC: 11 U/L
ANION GAP SERPL CALC-SCNC: 8 MMOL/L
AST SERPL-CCNC: 19 U/L
BILIRUB SERPL-MCNC: 0.1 MG/DL
BUN SERPL-MCNC: 11 MG/DL
CALCIUM SERPL-MCNC: 9.2 MG/DL
CHLORIDE SERPL-SCNC: 105 MMOL/L
CO2 SERPL-SCNC: 25 MMOL/L
CREAT SERPL-MCNC: 0.8 MG/DL
ERYTHROCYTE [DISTWIDTH] IN BLOOD BY AUTOMATED COUNT: 17.2 %
EST. GFR  (AFRICAN AMERICAN): >60 ML/MIN/1.73 M^2
EST. GFR  (NON AFRICAN AMERICAN): >60 ML/MIN/1.73 M^2
GLUCOSE SERPL-MCNC: 90 MG/DL
HCT VFR BLD AUTO: 36.2 %
HGB BLD-MCNC: 11.4 G/DL
MCH RBC QN AUTO: 25 PG
MCHC RBC AUTO-ENTMCNC: 31.5 G/DL
MCV RBC AUTO: 79 FL
NEUTROPHILS # BLD AUTO: 8.1 K/UL
PLATELET # BLD AUTO: 291 K/UL
PMV BLD AUTO: 8.9 FL
POTASSIUM SERPL-SCNC: 4.1 MMOL/L
PROT SERPL-MCNC: 7.2 G/DL
RBC # BLD AUTO: 4.56 M/UL
SODIUM SERPL-SCNC: 138 MMOL/L
WBC # BLD AUTO: 10.15 K/UL

## 2017-09-12 PROCEDURE — 3075F SYST BP GE 130 - 139MM HG: CPT | Mod: S$GLB,,, | Performed by: INTERNAL MEDICINE

## 2017-09-12 PROCEDURE — 96377 APPLICATON ON-BODY INJECTOR: CPT

## 2017-09-12 PROCEDURE — 99214 OFFICE O/P EST MOD 30 MIN: CPT | Mod: S$GLB,,, | Performed by: INTERNAL MEDICINE

## 2017-09-12 PROCEDURE — 85027 COMPLETE CBC AUTOMATED: CPT

## 2017-09-12 PROCEDURE — 96409 CHEMO IV PUSH SNGL DRUG: CPT

## 2017-09-12 PROCEDURE — A4216 STERILE WATER/SALINE, 10 ML: HCPCS | Performed by: INTERNAL MEDICINE

## 2017-09-12 PROCEDURE — 3079F DIAST BP 80-89 MM HG: CPT | Mod: S$GLB,,, | Performed by: INTERNAL MEDICINE

## 2017-09-12 PROCEDURE — 3008F BODY MASS INDEX DOCD: CPT | Mod: S$GLB,,, | Performed by: INTERNAL MEDICINE

## 2017-09-12 PROCEDURE — 25000003 PHARM REV CODE 250: Performed by: INTERNAL MEDICINE

## 2017-09-12 PROCEDURE — 96367 TX/PROPH/DG ADDL SEQ IV INF: CPT

## 2017-09-12 PROCEDURE — 63600175 PHARM REV CODE 636 W HCPCS: Performed by: INTERNAL MEDICINE

## 2017-09-12 PROCEDURE — 99999 PR PBB SHADOW E&M-EST. PATIENT-LVL III: CPT | Mod: PBBFAC,,, | Performed by: INTERNAL MEDICINE

## 2017-09-12 PROCEDURE — 80053 COMPREHEN METABOLIC PANEL: CPT

## 2017-09-12 PROCEDURE — 36415 COLL VENOUS BLD VENIPUNCTURE: CPT

## 2017-09-12 RX ORDER — SODIUM CHLORIDE 0.9 % (FLUSH) 0.9 %
10 SYRINGE (ML) INJECTION
Status: DISCONTINUED | OUTPATIENT
Start: 2017-09-12 | End: 2017-09-12 | Stop reason: HOSPADM

## 2017-09-12 RX ORDER — DOXORUBICIN HYDROCHLORIDE 2 MG/ML
60 INJECTION, SOLUTION INTRAVENOUS
Status: COMPLETED | OUTPATIENT
Start: 2017-09-12 | End: 2017-09-12

## 2017-09-12 RX ORDER — HEPARIN 100 UNIT/ML
500 SYRINGE INTRAVENOUS
Status: DISCONTINUED | OUTPATIENT
Start: 2017-09-12 | End: 2017-09-12 | Stop reason: HOSPADM

## 2017-09-12 RX ORDER — HEPARIN 100 UNIT/ML
500 SYRINGE INTRAVENOUS
Status: CANCELLED | OUTPATIENT
Start: 2017-09-12

## 2017-09-12 RX ORDER — SODIUM CHLORIDE 0.9 % (FLUSH) 0.9 %
10 SYRINGE (ML) INJECTION
Status: CANCELLED | OUTPATIENT
Start: 2017-09-12

## 2017-09-12 RX ORDER — DOXORUBICIN HYDROCHLORIDE 2 MG/ML
60 INJECTION, SOLUTION INTRAVENOUS
Status: CANCELLED | OUTPATIENT
Start: 2017-09-12

## 2017-09-12 RX ADMIN — SODIUM CHLORIDE 150 MG: 9 INJECTION, SOLUTION INTRAVENOUS at 02:09

## 2017-09-12 RX ADMIN — DOXORUBICIN HYDROCHLORIDE 122 MG: 2 INJECTION, SOLUTION INTRAVENOUS at 02:09

## 2017-09-12 RX ADMIN — HEPARIN 500 UNITS: 100 SYRINGE at 03:09

## 2017-09-12 RX ADMIN — PEGFILGRASTIM 6 MG: KIT SUBCUTANEOUS at 03:09

## 2017-09-12 RX ADMIN — DEXAMETHASONE SODIUM PHOSPHATE: 4 INJECTION, SOLUTION INTRAMUSCULAR; INTRAVENOUS at 01:09

## 2017-09-12 RX ADMIN — SODIUM CHLORIDE, PRESERVATIVE FREE 10 ML: 5 INJECTION INTRAVENOUS at 03:09

## 2017-09-12 RX ADMIN — SODIUM CHLORIDE: 0.9 INJECTION, SOLUTION INTRAVENOUS at 01:09

## 2017-09-12 NOTE — TELEPHONE ENCOUNTER
Called disability dept and spoke to Alondra, who confirmed the papers I faxed yesterday were received. She states no further information is needed at this time.

## 2017-09-12 NOTE — PROGRESS NOTES
Subjective:       Patient ID: Ermelinda Verde is a 56 y.o. female.    Chief Complaint: Chemotherapy    Mrs Verde is  a very pleasant 56-year-old -American female referred by  for a diagnosis of left breast cancer.  She is also a patient of .  She presents for adjuvant Adriamycin #4, she is receiving single agent adriamycin as she received wilbur-adjuvant TC X 4.      She went to the emergency room on September 6 with chest pain.  EKG was unremarkable and CT urogram showed no evidence of pulmonary embolism.   She's had no recurrence of those symptoms subsequently has felt well.  Appetites been good.  She does have some sluggish bowels the week after her chemotherapy.  She's having no shortness of breath.      Onc History:  She developed a palpable abnormality in her left breast in January 2017 which she noted on self-examination.  A diagnostic mammogram on January 19 showed a greater than 1 cm nodule in the upper outer portion of left breast.  By ultrasound this was lobulated and hypoechoic measuring 1.75 x 1.51 x 1.96 cm.     On January 24, 2017 a core needle biopsy was performed which showed infiltrating ductal carcinoma, high grade.  The tumor was ER negative, CA negative, and HER-2 negative.  A follow-up ultrasound on December 6 showed 2.5 x 2.2 x 1.5 cm left breast mass.  There was no abnormality noted in the left axilla.     She underwent sentinel lymph node biopsy on February 22.  That showed 4 negative lymph nodes.     She had 4 cycles of  Wilbur-adjuvantTaxotere and Cytoxan completed  on 5/9/17.     On June 26 she underwent left mastectomy.  That revealed 2 foci of invasive high-grade carcinoma measuring 14 mm and 1.5 mm.  Margins were negative.      Review of Systems   Constitutional: Negative for activity change, appetite change, chills, diaphoresis, fatigue, fever and unexpected weight change.   HENT: Negative for congestion, mouth sores and trouble swallowing.    Eyes: Negative for  visual disturbance.   Respiratory: Negative for cough and shortness of breath.    Cardiovascular: Negative for chest pain, palpitations and leg swelling.   Gastrointestinal: Positive for constipation. Negative for abdominal distention, abdominal pain, diarrhea, nausea and vomiting.   Genitourinary: Negative for dysuria and frequency.   Musculoskeletal: Negative for back pain.   Skin: Negative for pallor and rash.   Neurological: Positive for numbness. Negative for headaches.   Hematological: Negative for adenopathy.   Psychiatric/Behavioral: Negative for decreased concentration. The patient is not nervous/anxious.        Objective:      Physical Exam   Constitutional: She is oriented to person, place, and time. She appears well-developed and well-nourished. No distress.   ECOG 0  Presents with      HENT:   Mouth/Throat: Oropharynx is clear and moist. No oropharyngeal exudate.   Eyes: No scleral icterus.   Neck: No thyromegaly present.   Cardiovascular: Normal rate and regular rhythm.    Pulmonary/Chest: Effort normal and breath sounds normal. No respiratory distress. Right breast exhibits no mass, no nipple discharge and no skin change.       Right breast without mass, nodule or skin changes. Left breast without mass, nodule or skin changes. No axillary or supraclavicular adenopathy   Abdominal: Soft. She exhibits no distension and no mass. There is no tenderness.   Musculoskeletal:        Lymphadenopathy:     She has no cervical adenopathy.   Neurological: She is alert and oriented to person, place, and time.   Skin: Skin is warm.   Psychiatric: She has a normal mood and affect. Her behavior is normal. Thought content normal.   Vitals reviewed.      Assessment:       1. Malignant neoplasm of upper-outer quadrant of left breast in female, estrogen receptor negative    2. Encounter for antineoplastic chemotherapy        Plan:       She will complete her adjuvant chemotherapy this week and return in 3 weeks for  follow-up  She may have her port removed after this cycle.  I think.

## 2017-09-12 NOTE — PLAN OF CARE
Problem: Patient Care Overview  Goal: Plan of Care Review  Outcome: Ongoing (interventions implemented as appropriate)  Pt received Adriamycin with no complications. Neulasta OBI in place. Pt instructed to call MD with any problems. AVS given to patient and patient discharged home.

## 2017-09-20 ENCOUNTER — TELEPHONE (OUTPATIENT)
Dept: HEMATOLOGY/ONCOLOGY | Facility: CLINIC | Age: 57
End: 2017-09-20

## 2017-09-20 NOTE — TELEPHONE ENCOUNTER
----- Message from Natalie Irby sent at 9/20/2017 11:06 AM CDT -----  Contact: Pt  Pt would like to be called back regarding her port.  Pt wants to know if her port will be removed before or after her appointment.      Please call pt at [phone].        Thanks!

## 2017-10-03 ENCOUNTER — LAB VISIT (OUTPATIENT)
Dept: LAB | Facility: HOSPITAL | Age: 57
End: 2017-10-03
Attending: INTERNAL MEDICINE
Payer: COMMERCIAL

## 2017-10-03 ENCOUNTER — OFFICE VISIT (OUTPATIENT)
Dept: HEMATOLOGY/ONCOLOGY | Facility: CLINIC | Age: 57
End: 2017-10-03
Payer: COMMERCIAL

## 2017-10-03 VITALS
RESPIRATION RATE: 17 BRPM | TEMPERATURE: 98 F | BODY MASS INDEX: 32.28 KG/M2 | SYSTOLIC BLOOD PRESSURE: 135 MMHG | DIASTOLIC BLOOD PRESSURE: 73 MMHG | WEIGHT: 194 LBS | HEART RATE: 91 BPM

## 2017-10-03 DIAGNOSIS — C50.412 MALIGNANT NEOPLASM OF UPPER-OUTER QUADRANT OF LEFT BREAST IN FEMALE, ESTROGEN RECEPTOR NEGATIVE: Primary | ICD-10-CM

## 2017-10-03 DIAGNOSIS — Z17.1 MALIGNANT NEOPLASM OF UPPER-OUTER QUADRANT OF LEFT BREAST IN FEMALE, ESTROGEN RECEPTOR NEGATIVE: ICD-10-CM

## 2017-10-03 DIAGNOSIS — Z51.11 ENCOUNTER FOR ANTINEOPLASTIC CHEMOTHERAPY: ICD-10-CM

## 2017-10-03 DIAGNOSIS — C50.412 MALIGNANT NEOPLASM OF UPPER-OUTER QUADRANT OF LEFT BREAST IN FEMALE, ESTROGEN RECEPTOR NEGATIVE: ICD-10-CM

## 2017-10-03 DIAGNOSIS — Z17.1 MALIGNANT NEOPLASM OF UPPER-OUTER QUADRANT OF LEFT BREAST IN FEMALE, ESTROGEN RECEPTOR NEGATIVE: Primary | ICD-10-CM

## 2017-10-03 LAB
ALBUMIN SERPL BCP-MCNC: 3.6 G/DL
ALP SERPL-CCNC: 111 U/L
ALT SERPL W/O P-5'-P-CCNC: 19 U/L
ANION GAP SERPL CALC-SCNC: 8 MMOL/L
AST SERPL-CCNC: 33 U/L
BASOPHILS # BLD AUTO: 0.07 K/UL
BASOPHILS NFR BLD: 0.8 %
BILIRUB SERPL-MCNC: 0.2 MG/DL
BUN SERPL-MCNC: 9 MG/DL
CALCIUM SERPL-MCNC: 9.7 MG/DL
CHLORIDE SERPL-SCNC: 107 MMOL/L
CO2 SERPL-SCNC: 26 MMOL/L
CREAT SERPL-MCNC: 0.8 MG/DL
DIFFERENTIAL METHOD: ABNORMAL
EOSINOPHIL # BLD AUTO: 0 K/UL
EOSINOPHIL NFR BLD: 0.2 %
ERYTHROCYTE [DISTWIDTH] IN BLOOD BY AUTOMATED COUNT: 20.3 %
EST. GFR  (AFRICAN AMERICAN): >60 ML/MIN/1.73 M^2
EST. GFR  (NON AFRICAN AMERICAN): >60 ML/MIN/1.73 M^2
GLUCOSE SERPL-MCNC: 70 MG/DL
HCT VFR BLD AUTO: 35.7 %
HGB BLD-MCNC: 11.4 G/DL
LYMPHOCYTES # BLD AUTO: 1.5 K/UL
LYMPHOCYTES NFR BLD: 16.9 %
MCH RBC QN AUTO: 25.1 PG
MCHC RBC AUTO-ENTMCNC: 31.9 G/DL
MCV RBC AUTO: 79 FL
MONOCYTES # BLD AUTO: 1.3 K/UL
MONOCYTES NFR BLD: 14.9 %
NEUTROPHILS # BLD AUTO: 5.9 K/UL
NEUTROPHILS NFR BLD: 66.4 %
PLATELET # BLD AUTO: 305 K/UL
PMV BLD AUTO: 8.8 FL
POTASSIUM SERPL-SCNC: 4.1 MMOL/L
PROT SERPL-MCNC: 7.5 G/DL
RBC # BLD AUTO: 4.54 M/UL
SODIUM SERPL-SCNC: 141 MMOL/L
TROPONIN I SERPL DL<=0.01 NG/ML-MCNC: 0.03 NG/ML
WBC # BLD AUTO: 8.82 K/UL

## 2017-10-03 PROCEDURE — 84484 ASSAY OF TROPONIN QUANT: CPT

## 2017-10-03 PROCEDURE — 99213 OFFICE O/P EST LOW 20 MIN: CPT | Mod: S$GLB,,, | Performed by: INTERNAL MEDICINE

## 2017-10-03 PROCEDURE — 99999 PR PBB SHADOW E&M-EST. PATIENT-LVL III: CPT | Mod: PBBFAC,,, | Performed by: INTERNAL MEDICINE

## 2017-10-03 PROCEDURE — 85025 COMPLETE CBC W/AUTO DIFF WBC: CPT

## 2017-10-03 PROCEDURE — 80053 COMPREHEN METABOLIC PANEL: CPT

## 2017-10-03 PROCEDURE — 36415 COLL VENOUS BLD VENIPUNCTURE: CPT

## 2017-10-03 NOTE — PROGRESS NOTES
Subjective:       Patient ID: Ermelinda Verde is a 56 y.o. female.    Chief Complaint: Breast Cancer    Mrs Verde is  a very pleasant 56-year-old -American female referred by  for a diagnosis of left breast cancer.  She is also a patient of .    She completed postoperative adjuvant Adriamycin on September 12.   Other than some fatigue she's been feeling well.  Appetite is improving.  She has no pain and no shortness of breath.      Onc History:  She developed a palpable abnormality in her left breast in January 2017 which she noted on self-examination.  A diagnostic mammogram on January 19 showed a greater than 1 cm nodule in the upper outer portion of left breast.  By ultrasound this was lobulated and hypoechoic measuring 1.75 x 1.51 x 1.96 cm.     On January 24, 2017 a core needle biopsy was performed which showed infiltrating ductal carcinoma, high grade.  The tumor was ER negative, MT negative, and HER-2 negative.  A follow-up ultrasound on December 6 showed 2.5 x 2.2 x 1.5 cm left breast mass.  There was no abnormality noted in the left axilla.     She underwent sentinel lymph node biopsy on February 22.  That showed 4 negative lymph nodes.     She had 4 cycles of  Wilbur-adjuvantTaxotere and Cytoxan completed  on 5/9/17.     On June 26 she underwent left mastectomy.  That revealed 2 foci of invasive high-grade carcinoma measuring 14 mm and 1.5 mm.  Margins were negative.      Review of Systems   Constitutional: Positive for fatigue. Negative for activity change, appetite change, fever and unexpected weight change.   HENT: Negative for mouth sores.    Eyes: Negative for visual disturbance.   Respiratory: Negative for cough and shortness of breath.    Cardiovascular: Negative for chest pain.   Gastrointestinal: Positive for constipation. Negative for abdominal pain, diarrhea, nausea and vomiting.   Musculoskeletal: Negative for back pain.   Skin: Negative for pallor and rash.    Neurological: Positive for numbness. Negative for headaches.   Psychiatric/Behavioral: The patient is not nervous/anxious.        Objective:      Physical Exam   Constitutional: She is oriented to person, place, and time. She appears well-developed and well-nourished. No distress.   ECOG 0  Presents with      HENT:   Mouth/Throat: Oropharynx is clear and moist. No oropharyngeal exudate.   Eyes: No scleral icterus.   Cardiovascular: Normal rate and regular rhythm.    Pulmonary/Chest: Effort normal and breath sounds normal. No respiratory distress. Right breast exhibits no mass, no nipple discharge and no skin change.       Right breast without mass, nodule or skin changes. Left breast without mass, nodule or skin changes. No axillary or supraclavicular adenopathy   Abdominal: Soft. She exhibits no distension and no mass. There is no tenderness.   Musculoskeletal:        Lymphadenopathy:     She has no cervical adenopathy.   Neurological: She is alert and oriented to person, place, and time.   Skin: Skin is warm.   Psychiatric: She has a normal mood and affect. Her behavior is normal. Thought content normal.   Vitals reviewed.      Assessment:     labs today are unremarkable other than hemoglobin 11.4  1. Malignant neoplasm of upper-outer quadrant of left breast in female, estrogen receptor negative        Plan:       We'll arrange for port removal.    Return to clinic in 3 months for survivorship visit.        Distress Screening Results: Psychosocial Distress screening score of Distress Score: 3 noted and reviewed. No intervention indicated.

## 2017-10-10 ENCOUNTER — TELEPHONE (OUTPATIENT)
Dept: HEMATOLOGY/ONCOLOGY | Facility: CLINIC | Age: 57
End: 2017-10-10

## 2017-10-10 NOTE — TELEPHONE ENCOUNTER
Patient called requesting documentation of her completion of chemo/ability to return to work. Offered to send her the note from Dr. Reyna following her last chemotherapy treatment, she states this would be acceptable. Emailed note per her request. Told her if they require something more specific she may have to contact Dr. Reyna's office, gave her the number. Verbalized understanding

## 2017-10-19 ENCOUNTER — TELEPHONE (OUTPATIENT)
Dept: SURGERY | Facility: CLINIC | Age: 57
End: 2017-10-19

## 2017-10-19 NOTE — TELEPHONE ENCOUNTER
----- Message from Diego Evans RN sent at 9/20/2017 11:54 AM CDT -----  Call pt regarding port removal.

## 2017-10-19 NOTE — TELEPHONE ENCOUNTER
Received message form medical oncology that pt's port can be removed. Pt scheduled to have port removed in combination with her 4 mo f/u. Pt due to have additional plastic reconstructive surgery at the end of the month. Appt reminder mailed to pt.

## 2017-11-07 ENCOUNTER — PROCEDURE VISIT (OUTPATIENT)
Dept: SURGERY | Facility: CLINIC | Age: 57
End: 2017-11-07
Payer: COMMERCIAL

## 2017-11-07 VITALS — TEMPERATURE: 99 F | DIASTOLIC BLOOD PRESSURE: 98 MMHG | SYSTOLIC BLOOD PRESSURE: 163 MMHG | HEART RATE: 101 BPM

## 2017-11-07 DIAGNOSIS — Z17.1 MALIGNANT NEOPLASM OF UPPER-OUTER QUADRANT OF LEFT BREAST IN FEMALE, ESTROGEN RECEPTOR NEGATIVE: Primary | ICD-10-CM

## 2017-11-07 DIAGNOSIS — C50.412 MALIGNANT NEOPLASM OF UPPER-OUTER QUADRANT OF LEFT BREAST IN FEMALE, ESTROGEN RECEPTOR NEGATIVE: Primary | ICD-10-CM

## 2017-11-07 PROCEDURE — 36590 REMOVAL TUNNELED CV CATH: CPT | Mod: S$GLB,,, | Performed by: SURGERY

## 2017-11-07 NOTE — PROCEDURES
Port-A-Cath Removal Procedure Note  General Surgery      Indication: 55 yo W with a history of triple negative left breast cancer who presents for port removal. She has completed chemotherapy and has no need to keep her port.    Procedure:  The patient was prepped and draped in sterile fashion. 1% Lidocaine without epinephrine was used for local anesthesia. An incision was made at the port site, along the previous scar line on the chest. The incision was fashioned in a manner to remove the previous scar. Sharp dissection was carried down until the port was encountered. A 3-0 vicryl was used to place a figure of 8 stitch around the venotomy site and then the catheter was pulled and the suture tied down for hemostasis. A blunt hemostat was used to dissect around the port. With gentle traction, the port was removed from the pocket. Hemostasis was achieved with gentle pressure. Once hemostasis was confirmed, the incision was closed in two layers, the first with 3-0 vicryl and the skin layer was closed with 4-0 vicryl. Dermabond was applied as a sterile dressing.    Complications/Comments:  None    Estimated Blood Loss: 2 cc    Discharge instructions given: Yes    I was present and available throughout the procedure  Regina Rose MD  Breast Surgical Oncology

## 2017-11-08 ENCOUNTER — TELEPHONE (OUTPATIENT)
Dept: SURGERY | Facility: CLINIC | Age: 57
End: 2017-11-08

## 2017-11-08 NOTE — TELEPHONE ENCOUNTER
Patient called to let us know her surgery with Dr. Sewell is scheduled for 12/8. She would like to move her mammogram up to before that procedure per her and Dr. Rose's discussion yesterday. Rescheduled mammogram for 11/29 per patient request. Also reviewd her appt with Dr. Rose scheduled for 1/23/18. She verbalized understanding of appt information

## 2017-11-14 ENCOUNTER — PATIENT MESSAGE (OUTPATIENT)
Dept: SURGERY | Facility: CLINIC | Age: 57
End: 2017-11-14

## 2017-11-29 ENCOUNTER — HOSPITAL ENCOUNTER (OUTPATIENT)
Dept: RADIOLOGY | Facility: HOSPITAL | Age: 57
Discharge: HOME | End: 2017-11-29
Attending: SURGERY
Payer: COMMERCIAL

## 2017-11-29 ENCOUNTER — TELEPHONE (OUTPATIENT)
Dept: HEMATOLOGY/ONCOLOGY | Facility: CLINIC | Age: 57
End: 2017-11-29

## 2017-11-29 VITALS — WEIGHT: 194 LBS | BODY MASS INDEX: 32.32 KG/M2 | HEIGHT: 65 IN

## 2017-11-29 DIAGNOSIS — Z17.1 MALIGNANT NEOPLASM OF UPPER-OUTER QUADRANT OF LEFT BREAST IN FEMALE, ESTROGEN RECEPTOR NEGATIVE: ICD-10-CM

## 2017-11-29 DIAGNOSIS — C50.412 MALIGNANT NEOPLASM OF UPPER-OUTER QUADRANT OF LEFT BREAST IN FEMALE, ESTROGEN RECEPTOR NEGATIVE: ICD-10-CM

## 2017-11-29 PROCEDURE — 77061 BREAST TOMOSYNTHESIS UNI: CPT | Mod: TC,PO

## 2017-11-29 PROCEDURE — 77061 BREAST TOMOSYNTHESIS UNI: CPT | Mod: 26,,, | Performed by: RADIOLOGY

## 2017-11-29 PROCEDURE — 77065 DX MAMMO INCL CAD UNI: CPT | Mod: 26,,, | Performed by: RADIOLOGY

## 2017-11-29 NOTE — TELEPHONE ENCOUNTER
Patient said she need a clearance for surgery paperwork filled out by Dr Reyna. I asked her to drop off and I will call her when ready

## 2017-11-29 NOTE — TELEPHONE ENCOUNTER
----- Message from Olga Mart sent at 11/29/2017  3:52 PM CST -----  Contact: Pt  Pt calling asking if she could stop by to get a medical clearance for a sx that she is having next week    Pt call back number [phone]

## 2017-12-20 ENCOUNTER — TELEPHONE (OUTPATIENT)
Dept: HEMATOLOGY/ONCOLOGY | Facility: CLINIC | Age: 57
End: 2017-12-20

## 2017-12-20 NOTE — TELEPHONE ENCOUNTER
----- Message from Myrtle Johnson MA sent at 12/20/2017  2:12 PM CST -----  Contact: Self      ----- Message -----  From: Elizabeth Pena MA  Sent: 12/20/2017   1:59 PM  To: Myrtle Johnson MA    Please give pt a call.  ----- Message -----  From: Salome Irwin  Sent: 12/20/2017  12:31 PM  To: Nohemi FRIAS (Onco) Staff    Pt is returning a call to Staff regarding rescheduling her appt.    She can be reached at [phone].    Thank you.

## 2017-12-20 NOTE — TELEPHONE ENCOUNTER
----- Message from Olga Mart sent at 12/20/2017 11:50 AM CST -----  Contact: Pt  Pt calling regarding her appts on 1/3. She would like to move them to a different day        Pt call back number [phone]

## 2018-01-10 ENCOUNTER — LAB VISIT (OUTPATIENT)
Dept: LAB | Facility: HOSPITAL | Age: 58
End: 2018-01-10
Attending: INTERNAL MEDICINE
Payer: COMMERCIAL

## 2018-01-10 ENCOUNTER — OFFICE VISIT (OUTPATIENT)
Dept: HEMATOLOGY/ONCOLOGY | Facility: CLINIC | Age: 58
End: 2018-01-10
Payer: COMMERCIAL

## 2018-01-10 VITALS
OXYGEN SATURATION: 99 % | RESPIRATION RATE: 16 BRPM | SYSTOLIC BLOOD PRESSURE: 153 MMHG | WEIGHT: 197.06 LBS | HEART RATE: 93 BPM | HEIGHT: 65 IN | DIASTOLIC BLOOD PRESSURE: 79 MMHG | BODY MASS INDEX: 32.83 KG/M2 | TEMPERATURE: 98 F

## 2018-01-10 DIAGNOSIS — F41.9 ANXIETY: ICD-10-CM

## 2018-01-10 DIAGNOSIS — C50.412 MALIGNANT NEOPLASM OF UPPER-OUTER QUADRANT OF LEFT BREAST IN FEMALE, ESTROGEN RECEPTOR NEGATIVE: ICD-10-CM

## 2018-01-10 DIAGNOSIS — I10 HYPERTENSION, UNSPECIFIED TYPE: ICD-10-CM

## 2018-01-10 DIAGNOSIS — Z17.1 MALIGNANT NEOPLASM OF UPPER-OUTER QUADRANT OF LEFT BREAST IN FEMALE, ESTROGEN RECEPTOR NEGATIVE: ICD-10-CM

## 2018-01-10 LAB
ERYTHROCYTE [DISTWIDTH] IN BLOOD BY AUTOMATED COUNT: 14.1 %
HCT VFR BLD AUTO: 37.4 %
HGB BLD-MCNC: 11.7 G/DL
IMM GRANULOCYTES # BLD AUTO: 0 K/UL
MCH RBC QN AUTO: 26.3 PG
MCHC RBC AUTO-ENTMCNC: 31.3 G/DL
MCV RBC AUTO: 84 FL
NEUTROPHILS # BLD AUTO: 2.5 K/UL
PLATELET # BLD AUTO: 224 K/UL
PMV BLD AUTO: 9.3 FL
RBC # BLD AUTO: 4.45 M/UL
WBC # BLD AUTO: 4.7 K/UL

## 2018-01-10 PROCEDURE — 99215 OFFICE O/P EST HI 40 MIN: CPT | Mod: S$GLB,,, | Performed by: PHYSICIAN ASSISTANT

## 2018-01-10 PROCEDURE — 99999 PR PBB SHADOW E&M-EST. PATIENT-LVL IV: CPT | Mod: PBBFAC,,, | Performed by: PHYSICIAN ASSISTANT

## 2018-01-10 PROCEDURE — 36415 COLL VENOUS BLD VENIPUNCTURE: CPT

## 2018-01-10 PROCEDURE — 85027 COMPLETE CBC AUTOMATED: CPT

## 2018-01-10 NOTE — Clinical Note
Silvia- not sure when Dr. WILLIAMSON is coming back, but when she has an opening and she returns can we get this patient on her schedule? Patient was hesitant to come but I said lets just make a placeholder appt in case

## 2018-01-10 NOTE — Clinical Note
Axel in 3 months Referral to internal medicine to be set up with PCP (can you see if mike gerard, sarah meade or jessee stanton are available?)

## 2018-01-10 NOTE — PROGRESS NOTES
Subjective:       Patient ID: Ermelinda Verde is a 57 y.o. female.    Chief Complaint: Malignant neoplasm of upper-outer quadrant of left breast in    Mrs Verde is  a very pleasant 56-year-old -American female with left breast cancer.  She is also a patient of .  She is here for surveillance and completion of treatment summary.    She completed postoperative adjuvant Adriamycin on September 12.   Other than some fatigue she's been feeling well.  Appetite is improving.  She has no pain and no shortness of breath.  Notes some anxiety/stress related to recent diagnosis and work.  No khai pain.  Patient is not exercising.   Right mammogram from 11/2018 was unremarkable.       Onc History:  She developed a palpable abnormality in her left breast in January 2017 which she noted on self-examination.  A diagnostic mammogram on January 19 showed a greater than 1 cm nodule in the upper outer portion of left breast.  By ultrasound this was lobulated and hypoechoic measuring 1.75 x 1.51 x 1.96 cm.     On January 24, 2017 a core needle biopsy was performed which showed infiltrating ductal carcinoma, high grade.  The tumor was ER negative, DE negative, and HER-2 negative.  A follow-up ultrasound on December 6 showed 2.5 x 2.2 x 1.5 cm left breast mass.  There was no abnormality noted in the left axilla.     She underwent sentinel lymph node biopsy on February 22.  That showed 4 negative lymph nodes.     She had 4 cycles of  Wilbur-adjuvantTaxotere and Cytoxan completed  on 5/9/17.     On June 26 she underwent left mastectomy.  That revealed 2 foci of invasive high-grade carcinoma measuring 14 mm and 1.5 mm.  Margins were negative.\    Adjuvant adriamycin X 4 (dose dense) given due to residual disease; completed 9/12/17      Review of Systems   Constitutional: Positive for fatigue. Negative for activity change, appetite change, chills, diaphoresis, fever and unexpected weight change.   HENT: Negative for  congestion, rhinorrhea, sore throat and trouble swallowing.    Eyes: Negative for visual disturbance.   Respiratory: Negative for cough, chest tightness and shortness of breath.    Cardiovascular: Negative for chest pain, palpitations and leg swelling.   Gastrointestinal: Negative for abdominal pain, blood in stool, constipation, diarrhea, nausea and vomiting.   Genitourinary: Negative for dysuria, hematuria and vaginal bleeding.   Musculoskeletal: Negative for arthralgias, back pain and myalgias.   Skin: Negative for pallor and rash.   Neurological: Negative for dizziness, weakness and headaches.   Hematological: Negative for adenopathy. Does not bruise/bleed easily.   Psychiatric/Behavioral: Positive for dysphoric mood. Negative for suicidal ideas. The patient is not nervous/anxious.        Objective:      Physical Exam   Constitutional: She is oriented to person, place, and time. She appears well-developed and well-nourished. No distress.   ECOG 0  Presents with      HENT:   Head: Normocephalic.   Mouth/Throat: Oropharynx is clear and moist. No oropharyngeal exudate.   Eyes: Conjunctivae are normal. Pupils are equal, round, and reactive to light. No scleral icterus.   Neck: Normal range of motion. Neck supple. No thyromegaly present.   Cardiovascular: Normal rate and regular rhythm.    Pulmonary/Chest: Effort normal and breath sounds normal. No respiratory distress.   Right breast without mass, nodule or skin changes. Left breast without mass, nodule or skin changes. No axillary or supraclavicular adenopathy   Abdominal: Soft. Bowel sounds are normal. She exhibits no distension and no mass. There is no tenderness.   Musculoskeletal: Normal range of motion. She exhibits no edema or tenderness.   No spinal or paraspinal tenderness to palpation     Lymphadenopathy:     She has no cervical adenopathy.   Neurological: She is alert and oriented to person, place, and time. No cranial nerve deficit.   Skin: Skin  is warm and dry.   Psychiatric: She has a normal mood and affect. Her behavior is normal. Thought content normal.   Vitals reviewed.      Assessment:       1. Anxiety    2. Malignant neoplasm of upper-outer quadrant of left breast in female, estrogen receptor negative        Plan:         Return to clinic in 3 months. Annual right mammogram due 11/2018.     Referral to Primary Care    Exercise and weight loss encouraged.     We discussed the role of the survivorship clinic in her care.  Today we reviewed all aspects of treatment and the potential long term side effects of treatment.  We also discussed the emotional/psychological impact of diagnosis and treatment and I let her know about our psychosocial services (dedicated  and Psychologist).  Will refer patient to those services for some residual anxiety.    Written material on eating well, exercise, fear of recurrence, living with uncertainty and sadness depression provided to patient.     Distress Screening Results: Psychosocial Distress screening score of Distress Score: 9 noted and reviewed. No intervention indicated. As above will refer patient to heme/onc counseling.     60 mins spent with patient, over 50% of which was in counseling and completion of treatment summary.

## 2018-01-16 ENCOUNTER — TELEPHONE (OUTPATIENT)
Dept: INFUSION THERAPY | Facility: HOSPITAL | Age: 58
End: 2018-01-16

## 2018-02-02 ENCOUNTER — TELEPHONE (OUTPATIENT)
Dept: INFUSION THERAPY | Facility: HOSPITAL | Age: 58
End: 2018-02-02

## 2018-03-08 ENCOUNTER — OFFICE VISIT (OUTPATIENT)
Dept: INTERNAL MEDICINE | Facility: CLINIC | Age: 58
End: 2018-03-08
Payer: COMMERCIAL

## 2018-03-08 ENCOUNTER — OFFICE VISIT (OUTPATIENT)
Dept: PSYCHIATRY | Facility: CLINIC | Age: 58
End: 2018-03-08
Payer: COMMERCIAL

## 2018-03-08 ENCOUNTER — LAB VISIT (OUTPATIENT)
Dept: LAB | Facility: HOSPITAL | Age: 58
End: 2018-03-08
Attending: INTERNAL MEDICINE
Payer: COMMERCIAL

## 2018-03-08 VITALS
WEIGHT: 198.88 LBS | HEART RATE: 96 BPM | HEIGHT: 65 IN | RESPIRATION RATE: 16 BRPM | BODY MASS INDEX: 33.13 KG/M2 | DIASTOLIC BLOOD PRESSURE: 86 MMHG | SYSTOLIC BLOOD PRESSURE: 148 MMHG | TEMPERATURE: 98 F

## 2018-03-08 DIAGNOSIS — Z17.1 MALIGNANT NEOPLASM OF UPPER-OUTER QUADRANT OF LEFT BREAST IN FEMALE, ESTROGEN RECEPTOR NEGATIVE: ICD-10-CM

## 2018-03-08 DIAGNOSIS — D64.9 NORMOCYTIC ANEMIA: ICD-10-CM

## 2018-03-08 DIAGNOSIS — F43.21 ADJUSTMENT DISORDER WITH DEPRESSED MOOD: ICD-10-CM

## 2018-03-08 DIAGNOSIS — Z00.00 ANNUAL PHYSICAL EXAM: ICD-10-CM

## 2018-03-08 DIAGNOSIS — Z23 NEED FOR TDAP VACCINATION: ICD-10-CM

## 2018-03-08 DIAGNOSIS — Z12.4 CERVICAL CANCER SCREENING: ICD-10-CM

## 2018-03-08 DIAGNOSIS — C50.412 MALIGNANT NEOPLASM OF UPPER-OUTER QUADRANT OF LEFT BREAST IN FEMALE, ESTROGEN RECEPTOR NEGATIVE: ICD-10-CM

## 2018-03-08 DIAGNOSIS — Z00.00 ANNUAL PHYSICAL EXAM: Primary | ICD-10-CM

## 2018-03-08 DIAGNOSIS — E53.8 VITAMIN B12 DEFICIENCY: ICD-10-CM

## 2018-03-08 DIAGNOSIS — I10 BENIGN ESSENTIAL HYPERTENSION: ICD-10-CM

## 2018-03-08 LAB
25(OH)D3+25(OH)D2 SERPL-MCNC: 23 NG/ML
ALBUMIN SERPL BCP-MCNC: 4 G/DL
ALP SERPL-CCNC: 104 U/L
ALT SERPL W/O P-5'-P-CCNC: 13 U/L
ANION GAP SERPL CALC-SCNC: 9 MMOL/L
AST SERPL-CCNC: 20 U/L
BASOPHILS # BLD AUTO: 0.03 K/UL
BASOPHILS NFR BLD: 0.5 %
BILIRUB SERPL-MCNC: 0.3 MG/DL
BUN SERPL-MCNC: 9 MG/DL
CALCIUM SERPL-MCNC: 9.9 MG/DL
CHLORIDE SERPL-SCNC: 106 MMOL/L
CO2 SERPL-SCNC: 26 MMOL/L
CREAT SERPL-MCNC: 0.9 MG/DL
DIFFERENTIAL METHOD: ABNORMAL
EOSINOPHIL # BLD AUTO: 0.1 K/UL
EOSINOPHIL NFR BLD: 2 %
ERYTHROCYTE [DISTWIDTH] IN BLOOD BY AUTOMATED COUNT: 15.6 %
EST. GFR  (AFRICAN AMERICAN): >60 ML/MIN/1.73 M^2
EST. GFR  (NON AFRICAN AMERICAN): >60 ML/MIN/1.73 M^2
ESTIMATED AVG GLUCOSE: 126 MG/DL
FERRITIN SERPL-MCNC: 38 NG/ML
FOLATE SERPL-MCNC: 15.6 NG/ML
GLUCOSE SERPL-MCNC: 95 MG/DL
HBA1C MFR BLD HPLC: 6 %
HCT VFR BLD AUTO: 38.6 %
HGB BLD-MCNC: 12 G/DL
IRON SERPL-MCNC: 58 UG/DL
LYMPHOCYTES # BLD AUTO: 1.8 K/UL
LYMPHOCYTES NFR BLD: 31.3 %
MCH RBC QN AUTO: 25.6 PG
MCHC RBC AUTO-ENTMCNC: 31.1 G/DL
MCV RBC AUTO: 83 FL
MONOCYTES # BLD AUTO: 0.6 K/UL
MONOCYTES NFR BLD: 9.5 %
NEUTROPHILS # BLD AUTO: 3.3 K/UL
NEUTROPHILS NFR BLD: 56.5 %
NRBC BLD-RTO: 0 /100 WBC
PLATELET # BLD AUTO: 224 K/UL
PMV BLD AUTO: 9.6 FL
POTASSIUM SERPL-SCNC: 3.9 MMOL/L
PROT SERPL-MCNC: 8.4 G/DL
RBC # BLD AUTO: 4.68 M/UL
SATURATED IRON: 13 %
SODIUM SERPL-SCNC: 141 MMOL/L
TOTAL IRON BINDING CAPACITY: 463 UG/DL
TRANSFERRIN SERPL-MCNC: 313 MG/DL
TSH SERPL DL<=0.005 MIU/L-ACNC: 0.57 UIU/ML
VIT B12 SERPL-MCNC: 387 PG/ML
WBC # BLD AUTO: 5.87 K/UL

## 2018-03-08 PROCEDURE — 90715 TDAP VACCINE 7 YRS/> IM: CPT | Mod: S$GLB,,, | Performed by: INTERNAL MEDICINE

## 2018-03-08 PROCEDURE — 90471 IMMUNIZATION ADMIN: CPT | Mod: S$GLB,,, | Performed by: INTERNAL MEDICINE

## 2018-03-08 PROCEDURE — 90791 PSYCH DIAGNOSTIC EVALUATION: CPT | Mod: S$GLB,,, | Performed by: PSYCHOLOGIST

## 2018-03-08 PROCEDURE — 83540 ASSAY OF IRON: CPT

## 2018-03-08 PROCEDURE — 99999 PR PBB SHADOW E&M-EST. PATIENT-LVL IV: CPT | Mod: PBBFAC,,, | Performed by: INTERNAL MEDICINE

## 2018-03-08 PROCEDURE — 99386 PREV VISIT NEW AGE 40-64: CPT | Mod: 25,S$GLB,, | Performed by: INTERNAL MEDICINE

## 2018-03-08 PROCEDURE — 82746 ASSAY OF FOLIC ACID SERUM: CPT

## 2018-03-08 PROCEDURE — 36415 COLL VENOUS BLD VENIPUNCTURE: CPT

## 2018-03-08 PROCEDURE — 82607 VITAMIN B-12: CPT

## 2018-03-08 PROCEDURE — 82728 ASSAY OF FERRITIN: CPT

## 2018-03-08 PROCEDURE — 82306 VITAMIN D 25 HYDROXY: CPT

## 2018-03-08 PROCEDURE — 85025 COMPLETE CBC W/AUTO DIFF WBC: CPT

## 2018-03-08 PROCEDURE — 84443 ASSAY THYROID STIM HORMONE: CPT

## 2018-03-08 PROCEDURE — 99999 PR PBB SHADOW E&M-EST. PATIENT-LVL I: CPT | Mod: PBBFAC,,, | Performed by: PSYCHOLOGIST

## 2018-03-08 PROCEDURE — 80053 COMPREHEN METABOLIC PANEL: CPT

## 2018-03-08 PROCEDURE — 83036 HEMOGLOBIN GLYCOSYLATED A1C: CPT

## 2018-03-08 RX ORDER — AMLODIPINE AND BENAZEPRIL HYDROCHLORIDE 10; 40 MG/1; MG/1
CAPSULE ORAL
Qty: 90 CAPSULE | Refills: 3 | Status: ON HOLD | OUTPATIENT
Start: 2018-03-08 | End: 2019-02-13 | Stop reason: HOSPADM

## 2018-03-08 NOTE — PROGRESS NOTES
PSYCHO-ONCOLOGY INTAKE    Diagnostic Interview - CPT 32967    Date: 3/8/2018  Site: [facility]     Evaluation Length (direct face-to-face time):  1 hour     Referral Source: Tri Song PA-C Oncologist: Reta Weeks MD    Clinical status of patient: Outpatient    Ermelinda Verde, a 57 y.o. female, seen for initial evaluation visit.  Met with patient.    Chief complaint/reason for encounter: adjustment to illness, depression and sleep    Medical/Surgical History:    Patient Active Problem List   Diagnosis    Hypertension    Breast cancer of upper-outer quadrant of left female breast    Encounter for antineoplastic chemotherapy    Personal history of malignant neoplasm of breast    Vitamin B12 deficiency    Adjustment disorder with depressed mood       Health Behaviors:       ETOH Use: No (past social and within NIAAA healthy use limits for age/gender)     Tobacco Use: No   Illicit Drug Use:  No     Prescription Misuse:No   Caffeine: 2 servings coffee/day   Exercise: 15 min stationary bike 2x day     Family History:   Psychiatric illness: No     Alcohol/Drug Abuse: No     Suicide: No      Past Psychiatric History:   Inpatient treatment: No     Outpatient treatment: No     Prior substance abuse treatment: Yes     Suicide Attempts: No     Psychotropic Medications: Current/Past: None      Current medications below, allergies reviewed in chart.  Current Outpatient Prescriptions   Medication    amlodipine-benazepril (LOTREL) 10-40 mg per capsule    CALCIUM CARBONATE (CALCIUM 500 ORAL)    cyanocobalamin (VITAMIN B-12) 500 MCG tablet    DOCUSATE CALCIUM (STOOL SOFTENER ORAL)    ferrous gluconate (FERGON) 324 MG tablet     No current facility-administered medications for this visit.         CAM Therapies: None     Screening:   Summer: Denies Psychosis: Denies    Generalized anxiety: Denies  (Standardized anxiety screening used- RIRI-7= does not meet screener) Panic Disorder: Denies    Social/specific  "phobia: Denies OCD: Denies   Sexual Dysfunction:  Decreased interest since diagnosis Trauma: Denies      Social situation/Stressors: Ermelinda Verde lives with her  and 3 grandchildren (ages 10, 9, and 5) in [place], [place].  She is a full-time bank .  Ermelinda Verde has been  1x and has 3 children (Divya- Deale, Vanessa-Webster; Juan Miguel- [place]).  Her spouse is Chan. They have cared for Vanessa's children for 3.5 years.  The patient reports minimal social support ("Just my  and me"). She has 5 siblings, one of whom checks in on her regularly. Ermelinda Verde is an active member of the [employer], but does not feel part of her Sabian[employer] community due to absence due to illness.    Additional stressors: overwhelmed with care needs of grandchildren    Strengths:Steady employment, financial stability, Housing stability, Able to vocalize needs, Values and traditions, Resources - social, interpersonal, monetary, Vocational interests, hobbies and/or talents and Cultural/spiritual/Roman Catholic and community involvement  Liabilities: Lack of social supports    Current Evaluation:     Mental Status Exam: Ermelinda Verde arrived promptly for the assessment session. The patient was fully cooperative throughout the interview and was an adequate historian   Appearance: age appropriate,  casually dressed, well groomed  Behavior/Cooperation: friendly and cooperative  Speech:normal in rate, volume, and tone   Mood: depressed  Affect: dysphoric  Thought Process: goal-directed, logical  Thought Content: normal, no suicidality, no homicidality, delusions, or paranoia;did not appear to be responding to internal stimuli during the interview.   Orientation: grossly intact  Memory: Grossly intact  Attention Span/Concentration: Attends to interview without distraction; reports subjective difficulty  Fund of Knowledge: average  Estimate of Intelligence: average from verbal skills and " "history  Cognition: grossly intact  Insight: patient has awareness of illness; good insight into own behavior and behavior of others  Judgment: the patient's behavior is adequate to circumstances    Distress Score    Distress Score: 7        Practical Problems Physical Problems   : Yes Appearance: Yes                           Constipation: Yes                 Family Problems Fatigue: Yes    Dealing with Children: Yes      Dealing with Partner: Yes                       Memory / Concentration: Yes   Emotional Problems              Fears: Yes          Pain: Yes    Sadness: Yes Sexual: Yes    Worry: Yes      Loss of Interest in Usual Activities: Yes            Spiritual/Religions Concerns               Other Problems            MMSE:     Pain: 2    History of present illness: As  Per Dr. Reyna: Mrs. Verde developed a palpable abnormality in her left breast in January 2017 which she noted on self-examination.  A diagnostic mammogram on January 19 showed a greater than 1 cm nodule in the upper outer portion of left breast.  By ultrasound this was lobulated and hypoechoic measuring 1.75 x 1.51 x 1.96 cm.     On January 24, 2017 a core needle biopsy was performed which showed infiltrating ductal carcinoma, high grade.  The tumor was ER negative, OK negative, and HER-2 negative.  She underwent sentinel lymph node biopsy on February 22.  That showed 4 negative lymph nodes.  She had 4 cycles of  Wilbur-adjuvantTaxotere and Cytoxan completed  on 5/9/17.     On June 26, 2017 she underwent left mastectomy.  That revealed 2 foci of invasive high-grade carcinoma measuring 14 mm and 1.5 mm.  Margins were negative.  Adjuvant adriamycin X 4 (dose dense) given due to residual disease; completed 9/12/17.     Ermelinda Verde has adjusted to illness with difficulty primarily through active coping strategies, focus on alternative activities and prayer. She reports "everything is constantly running through my brain."  She cites " "regrets and concerns about the future care of her grandchildren as primary worries. She is bothered by recurrence fears.  She has engaged in appropriate information gathering.  The patient has minimal family/friend support.  Her support system is coping well with the diagnosis/treatment/prognosis (primarily her ). Illness related psychosocial stressors include  difficulties, absence from work and difficulty meeting family responsibiilties.  The patient has an excellent partnership with her [facility] treatment team. The patient reports the following barriers to cancer care: none.    Symptoms:   · Mood: depressed mood, diminished interest, insomnia, psychomotor retardation, fatigue, worthlessness/guilt, poor concentration, altered libido, thoughts of death, tearfulness and social isolation; often wonders "why am I here?"  ; decreased sexual interest/enjoyment PHQ-9=5  · Anxiety: excessive anxiety/worry and irritability RIRI-7=4  · Substance abuse: denied  · Cognitive functioning: denied  · Health behaviors: noncontributory; trying to "eat better" and move more  · Sleep:  in bed/asleep 10:30p-5:30a:no concerns, no sleep onset difficulty, 1 x WASO (with 30-40 min re-onset difficulty), no reported apneic events, no naps, no restless legs, AM caffeine, no sleep hygiene considerations and no use of OTC/melatonin/hypnotics/benzodiazepines       Assessment - Diagnosis - Goals:       ICD-10-CM ICD-9-CM   1. Adjustment disorder with depressed mood F43.21 309.0       Plan:individual psychotherapy    Summary and Recommendations  Ermelinda Verde is a 57 y.o. female referred by Tri Song PA-C for psychological evaluation and treatment.    Ms. Verde appears to be struggling to cope with her breast cancer survivorship and the demands of caring for 3 young grandchildren. Mood improvement strategies and stress management strategies were discussed. Patient will also consider returning to see me for CBT " for depression/insomnia.   Goals:  1. Decrease hours at work, if possible financially  2. Increased pleasant events scheduling  3. Regular date night with      Brando Dawn, PhD  Clinical Psychologist  [place] License #[id number]

## 2018-03-08 NOTE — LETTER
March 8, 2018      Tri Song PA-C  [address]  [facility] [address]           [facility]  [address]  [facility] [address]  Phone: [phone]  Fax: [phone]          Patient: Ermelinda Verde   MR Number: [id number]   YOB: 1960   Date of Visit: 3/8/2018       Dear Tri Song:    Thank you for referring Ermelinda Verde to me for evaluation. Attached you will find relevant portions of my assessment and plan of care.    If you have questions, please do not hesitate to call me. I look forward to following Ermelinda Verde along with you.    Sincerely,    Reta Weeks MD    Enclosure  CC:  No Recipients    If you would like to receive this communication electronically, please contact [email or web address] or [phone] to request more information on [employer] access.    For providers and/or their staff who would like to refer a patient to [facility], please contact us through our one-stop-shop provider referral line, [facility], at [phone].    If you feel you have received this communication in error or would no longer like to receive these types of communications, please e-mail [email or web address]

## 2018-03-08 NOTE — PROGRESS NOTES
INTERNAL MEDICINE INITIAL VISIT NOTE      CHIEF COMPLAINT     Est care/annual    HPI     Ermelinda Verde is a 57 y.o. AA female, accompanied by  Júnior, who presents to est care/annual. LMP was 3/2017. Last Pap is 3/2017 - Dr. Verma.    LUQ breast CA (high grade infiltrating ductal carcinoma - ER/CO/HER2 neg) 2017. BRCA neg per pt.   S/P neoadjuvant taxotere and cytoxan 17, L mastectomy 2017, postop adjuvant adriamycin 17. S/p port removal 10/4/17.  Follows w/ HOWARD Bailey - last seen 18; f/u in 3 mo. Prior to that, follows w/ Dr. Reyna.  Last R diagnostic MMG 17 was neg. Repeat in 1 yr.  No numbness/tingling in the legs/feet.     Chronic normocytic anemia.   Reports history of iron deficiency anemia. Out of iron tablets.    Reports still feels tired. Gets up to urinate 1x/night and then difficulty getting to sleep. Sleeps about 6-7 hours a night. Drinks a lot of fluid throughout the day but especially at night.  Taking b12 supplement to see if this is helping w/ fatigue but it has not helped.     HTN - amlodipine-benazepril 10-40mg daily, toprol xl 50mg 1/2 tablet daily.   Ran out of medicines as her previous PCP moved to [place].  Does not exercise.      Past Medical History:  Past Medical History:   Diagnosis Date    Breast cancer     Cancer     breast    Hypertension        Past Surgical History:  Past Surgical History:   Procedure Laterality Date    BREAST BIOPSY       SECTION      D&C      MASTECTOMY      PORTACATH PLACEMENT      SENTINEL LYMPH NODE BIOPSY  2017    left    UTERINE FIBROID SURGERY         Allergies:  Review of patient's allergies indicates:  No Known Allergies    Home Medications:  Prior to Admission medications    Medication Sig Start Date End Date Taking? Authorizing Provider   amlodipine-benazepril (LOTREL) 10-40 mg per capsule TK 1 C PO QD 17   Historical Provider, MD   CALCIUM CARBONATE (CALCIUM 500 ORAL) Take by mouth.     "Historical Provider, MD   cyanocobalamin (VITAMIN B-12) 500 MCG tablet Take 500 mcg by mouth once daily.    Historical Provider, MD   DOCUSATE CALCIUM (STOOL SOFTENER ORAL) Take by mouth.    Historical Provider, MD   ferrous gluconate (FERGON) 324 MG tablet TK 1 T PO QD 12/1/16   Historical Provider, MD   hydrochlorothiazide (HYDRODIURIL) 25 MG tablet TK 1 T PO ONCE A DAY 12/1/16   Historical Provider, MD   metoprolol succinate (TOPROL-XL) 50 MG 24 hr tablet TK 1/2 T PO QD 12/1/16   Historical Provider, MD   potassium chloride SA (K-DUR,KLOR-CON) 20 MEQ tablet TK 1 T PO  QD 12/26/16   Historical Provider, MD       Family History:  Family History   Problem Relation Age of Onset    Heart disease Father     Hypertension Father     Breast cancer Sister      at age 52 or 53    Hypertension Mother     No Known Problems Brother     Thyroid disease Daughter      hyperthyroidism s/p thyroidectomy    No Known Problems Son     No Known Problems Brother     No Known Problems Brother     No Known Problems Sister     No Known Problems Daughter        Social History:  Social History   Substance Use Topics    Smoking status: Never Smoker    Smokeless tobacco: Never Used    Alcohol use Yes      Comment: wine       Review of Systems:  Review of Systems Comprehensive review of systems otherwise negative. See history/subjective section for more details.    Health Maintainence:   Td - need one.   Flu - decline  MMG 11/2017  Pap 3/2017.  C-SCOPE 2011 - repeat in 10 yrs. Dr. Pretty in [place]  Hep C screening - need.    PHYSICAL EXAM     BP (!) 148/86   Pulse 96   Temp 98.1 °F (36.7 °C) (Oral)   Resp 16   Ht 5' 5" (1.651 m)   Wt 90.2 kg (198 lb 13.7 oz)   BMI 33.09 kg/m²     GEN - A+OX4, NAD   HEENT - PERRL, EOMI, OP clear. MMM.   Neck - No thyromegaly or cervical LAD. No thyroid masses felt.  CV - RRR, no m/r   Chest - CTAB, no wheezing or rhonchi  Abd - S/NT/ND/+BS.   Ext - 2+BDP and radial pulses. No LE edema.   Neuro " - PERRL, EOMI, no nystagmus, eyebrow raise, facial sensation, hearing, m of mastication, smile, palatal raise, shoulder shrug, tongue protrusion symmetric and intact. 5/5 BUE and BLE strength. Sensation to light touch intact throughout. 2+ DTRs. Normal gait.   MSK - No spinal tenderness to palpation. Normal gait.   Skin - No rash.    LABS     Previous labs reviewed.    ASSESSMENT/PLAN     Ermelinda Verde is a 57 y.o. female with  Ermelinda was seen today for establish care.    Diagnoses and all orders for this visit:    Annual physical exam  -     Ambulatory Referral to Obstetrics / Gynecology  -     CBC auto differential; Future; Expected date: 03/08/2018  -     Comprehensive metabolic panel; Future; Expected date: 03/08/2018  -     Hemoglobin A1c; Future; Expected date: 03/08/2018  -     TSH; Future; Expected date: 03/08/2018  -     Vitamin D; Future; Expected date: 03/08/2018    Malignant neoplasm of upper-outer quadrant of left breast in female, estrogen receptor negative - f/u w/ Dr. Reyna.     Benign essential hypertension - ran out of meds x 3 mo. Will restart lotrel first. If BP not at goal at next visit and HR still on the higher side of normal, will restart toprol xl also.   -     amlodipine-benazepril (LOTREL) 10-40 mg per capsule; TK 1 C PO QD    Cervical cancer screening  -     Ambulatory Referral to Obstetrics / Gynecology    Normocytic anemia  -     Ferritin; Future; Expected date: 03/08/2018  -     Folate; Future; Expected date: 03/08/2018  -     Iron and TIBC; Future; Expected date: 03/08/2018  -     Vitamin B12; Future; Expected date: 03/08/2018    Vitamin B12 deficiency - per her last PCP's office notes - b12 147 in 2016. Recheck today. Pt reports still taking B12 500mcg daily.     Need for Tdap vaccination  -     Tdap Vaccine    Check for Hep C antibody next visit.       RTC in 5 weeks for BP recheck, sooner if needed and depending on labs.    Reta Weeks MD  Department of Internal Medicine - [facility]  [address]  12:30 PM

## 2018-03-08 NOTE — LETTER
March 11, 2018        Tri Song PA-C  [address] [facility] [address]             [facility]  [address] [facility] [address]  Phone: [phone]  Fax: [phone]   Patient: Ermelinda Verde   MR Number: [id number]   YOB: 1960   Date of Visit: 3/8/2018       Dear Dr. Song:    Thank you for referring Ermelinda Verde to me for evaluation. Below are the relevant portions of my assessment and plan of care.     Ermelinda Verde is a 57 y.o. female referred by Tri Song PA-C for psychological evaluation and treatment.  Ms. Verde appears to be struggling to cope with her breast cancer survivorship and the demands of caring for 3 young grandchildren. Mood improvement strategies and stress management strategies were discussed. Patient will also consider returning to see me for CBT for depression/insomnia.      If you have questions, please do not hesitate to call me. I look forward to following Ermelinda along with you.    Sincerely,      Brando Arora, PhD           CC  No Recipients

## 2018-03-11 PROBLEM — F43.21 ADJUSTMENT DISORDER WITH DEPRESSED MOOD: Status: ACTIVE | Noted: 2018-03-11

## 2018-03-19 ENCOUNTER — TELEPHONE (OUTPATIENT)
Dept: INTERNAL MEDICINE | Facility: CLINIC | Age: 58
End: 2018-03-19

## 2018-03-19 DIAGNOSIS — R73.03 PRE-DIABETES: ICD-10-CM

## 2018-03-19 NOTE — TELEPHONE ENCOUNTER
B12 level is on the low side normal so she can continue taking her B12 if she wants. Folate is normal.     Hemoglobin is borderline and labs w/ some signs of iron deficiency while on fergon. Unknown cause?    Vit D def. 1000 units daily. a1c in PDM range.

## 2018-03-29 NOTE — TELEPHONE ENCOUNTER
Called pt. No answer. Left message for pt to call back.  Results sent through portal. Has f/u appt w/ me on 4/12.

## 2018-04-09 NOTE — PROGRESS NOTES
Subjective:       Patient ID: Ermelinda Verde is a 57 y.o. female.    Chief Complaint: No chief complaint on file.    Mrs Verde is  a very pleasant 57-year-old -American female who returns for a diagnosis of left breast cancer.  She is a patient of Dr. Rose and also a patient of .        Last mammogram was November 2017.      Onc History:  She developed a palpable abnormality in her left breast in January 2017 which she noted on self-examination.  A diagnostic mammogram on January 19 showed a greater than 1 cm nodule in the upper outer portion of left breast.  By ultrasound this was lobulated and hypoechoic measuring 1.75 x 1.51 x 1.96 cm.     On January 24, 2017 a core needle biopsy was performed which showed infiltrating ductal carcinoma, high grade.  The tumor was ER negative, CA negative, and HER-2 negative.  A follow-up ultrasound on December 6 showed 2.5 x 2.2 x 1.5 cm left breast mass.  There was no abnormality noted in the left axilla.     She underwent sentinel lymph node biopsy on February 22.  That showed 4 negative lymph nodes.     She had 4 cycles of  Wilbur-adjuvantTaxotere and Cytoxan completed  on 5/9/17.     On June 26 she underwent left mastectomy.  That revealed 2 foci of invasive high-grade carcinoma measuring 14 mm and 1.5 mm.  Margins were negative.    She completed 4 cycles of adjuvant Adriamycin on September 12, 2017.    Today she reports she has some fatigue but is doing some exercise on her stationary bike, about 15 minutes at a time.  She has some achiness in the morning when she first wakes up.  She denies any shortness of breath.  Appetite and bowel function have been good.      Review of Systems   Constitutional: Positive for fatigue. Negative for activity change, appetite change, fever and unexpected weight change.   HENT: Negative for mouth sores.    Eyes: Negative for visual disturbance.   Respiratory: Negative for cough and shortness of breath.    Cardiovascular:  Negative for chest pain.   Gastrointestinal: Positive for constipation. Negative for abdominal pain, diarrhea, nausea and vomiting.   Genitourinary: Negative for frequency.   Musculoskeletal: Negative for back pain.   Skin: Negative for pallor and rash.   Neurological: Positive for numbness. Negative for headaches.   Hematological: Negative for adenopathy.   Psychiatric/Behavioral: The patient is not nervous/anxious.        Objective:      Physical Exam   Constitutional: She is oriented to person, place, and time. She appears well-developed and well-nourished. No distress.   ECOG 0  Presents with      HENT:   Mouth/Throat: Oropharynx is clear and moist. No oropharyngeal exudate.   Eyes: No scleral icterus.   Cardiovascular: Normal rate and regular rhythm.    Pulmonary/Chest: Effort normal and breath sounds normal. No respiratory distress. Right breast exhibits no mass, no nipple discharge and no skin change.       Right breast without mass, nodule or skin changes. Left breast without mass, nodule or skin changes. No axillary or supraclavicular adenopathy   Abdominal: Soft. She exhibits no distension and no mass. There is no tenderness.   Musculoskeletal:        Lymphadenopathy:     She has no cervical adenopathy.   Neurological: She is alert and oriented to person, place, and time.   Skin: Skin is warm.   Psychiatric: She has a normal mood and affect. Her behavior is normal. Thought content normal.   Vitals reviewed.      Assessment:     recent bloodwork was normal  1. Malignant neoplasm of upper-outer quadrant of left breast in female, estrogen receptor negative      2. Probable oil cyst - left reconstruction  Plan:     US of  left breast reconstruction( confirms oil cyst)  Return to clinic in 3 months.  Distress Screening Results: Psychosocial Distress screening score of Distress Score: 5 noted and reviewed. No intervention indicated.

## 2018-04-10 ENCOUNTER — HOSPITAL ENCOUNTER (OUTPATIENT)
Dept: RADIOLOGY | Facility: HOSPITAL | Age: 58
Discharge: HOME | End: 2018-04-10
Attending: INTERNAL MEDICINE
Payer: COMMERCIAL

## 2018-04-10 ENCOUNTER — OFFICE VISIT (OUTPATIENT)
Dept: HEMATOLOGY/ONCOLOGY | Facility: CLINIC | Age: 58
End: 2018-04-10
Payer: COMMERCIAL

## 2018-04-10 VITALS
SYSTOLIC BLOOD PRESSURE: 145 MMHG | TEMPERATURE: 98 F | BODY MASS INDEX: 32.28 KG/M2 | WEIGHT: 194 LBS | RESPIRATION RATE: 15 BRPM | HEART RATE: 99 BPM | DIASTOLIC BLOOD PRESSURE: 78 MMHG

## 2018-04-10 VITALS — BODY MASS INDEX: 32.32 KG/M2 | WEIGHT: 194 LBS | HEIGHT: 65 IN

## 2018-04-10 DIAGNOSIS — C50.412 MALIGNANT NEOPLASM OF UPPER-OUTER QUADRANT OF LEFT BREAST IN FEMALE, ESTROGEN RECEPTOR NEGATIVE: Primary | ICD-10-CM

## 2018-04-10 DIAGNOSIS — Z17.1 MALIGNANT NEOPLASM OF UPPER-OUTER QUADRANT OF LEFT BREAST IN FEMALE, ESTROGEN RECEPTOR NEGATIVE: ICD-10-CM

## 2018-04-10 DIAGNOSIS — Z17.1 MALIGNANT NEOPLASM OF UPPER-OUTER QUADRANT OF LEFT BREAST IN FEMALE, ESTROGEN RECEPTOR NEGATIVE: Primary | ICD-10-CM

## 2018-04-10 DIAGNOSIS — C50.412 MALIGNANT NEOPLASM OF UPPER-OUTER QUADRANT OF LEFT BREAST IN FEMALE, ESTROGEN RECEPTOR NEGATIVE: ICD-10-CM

## 2018-04-10 PROCEDURE — 3077F SYST BP >= 140 MM HG: CPT | Mod: CPTII,S$GLB,, | Performed by: INTERNAL MEDICINE

## 2018-04-10 PROCEDURE — 77065 DX MAMMO INCL CAD UNI: CPT | Mod: TC,PO,LT

## 2018-04-10 PROCEDURE — 99999 PR PBB SHADOW E&M-EST. PATIENT-LVL III: CPT | Mod: PBBFAC,,, | Performed by: INTERNAL MEDICINE

## 2018-04-10 PROCEDURE — 77065 DX MAMMO INCL CAD UNI: CPT | Mod: 26,LT,, | Performed by: RADIOLOGY

## 2018-04-10 PROCEDURE — 99213 OFFICE O/P EST LOW 20 MIN: CPT | Mod: S$GLB,,, | Performed by: INTERNAL MEDICINE

## 2018-04-10 PROCEDURE — 77061 BREAST TOMOSYNTHESIS UNI: CPT | Mod: TC,PO,LT

## 2018-04-10 PROCEDURE — 3078F DIAST BP <80 MM HG: CPT | Mod: CPTII,S$GLB,, | Performed by: INTERNAL MEDICINE

## 2018-04-10 PROCEDURE — 77061 BREAST TOMOSYNTHESIS UNI: CPT | Mod: 26,LT,, | Performed by: RADIOLOGY

## 2018-04-12 ENCOUNTER — OFFICE VISIT (OUTPATIENT)
Dept: OBSTETRICS AND GYNECOLOGY | Facility: CLINIC | Age: 58
End: 2018-04-12
Payer: COMMERCIAL

## 2018-04-12 ENCOUNTER — OFFICE VISIT (OUTPATIENT)
Dept: INTERNAL MEDICINE | Facility: CLINIC | Age: 58
End: 2018-04-12
Payer: COMMERCIAL

## 2018-04-12 VITALS
SYSTOLIC BLOOD PRESSURE: 114 MMHG | RESPIRATION RATE: 15 BRPM | TEMPERATURE: 98 F | DIASTOLIC BLOOD PRESSURE: 70 MMHG | HEART RATE: 89 BPM | BODY MASS INDEX: 32.54 KG/M2 | HEIGHT: 65 IN | WEIGHT: 195.31 LBS

## 2018-04-12 VITALS
HEIGHT: 65 IN | DIASTOLIC BLOOD PRESSURE: 74 MMHG | SYSTOLIC BLOOD PRESSURE: 114 MMHG | BODY MASS INDEX: 32.3 KG/M2 | WEIGHT: 193.88 LBS

## 2018-04-12 DIAGNOSIS — I10 BENIGN ESSENTIAL HYPERTENSION: Primary | ICD-10-CM

## 2018-04-12 DIAGNOSIS — L91.0 KELOID: ICD-10-CM

## 2018-04-12 DIAGNOSIS — E61.1 IRON DEFICIENCY: ICD-10-CM

## 2018-04-12 DIAGNOSIS — Z86.018 HISTORY OF UTERINE FIBROID: ICD-10-CM

## 2018-04-12 DIAGNOSIS — Z11.59 NEED FOR HEPATITIS C SCREENING TEST: ICD-10-CM

## 2018-04-12 DIAGNOSIS — R73.03 PRE-DIABETES: ICD-10-CM

## 2018-04-12 DIAGNOSIS — E55.9 VITAMIN D DEFICIENCY: ICD-10-CM

## 2018-04-12 DIAGNOSIS — Z01.419 WELL WOMAN EXAM WITH ROUTINE GYNECOLOGICAL EXAM: Primary | ICD-10-CM

## 2018-04-12 DIAGNOSIS — Z78.0 POSTMENOPAUSE: ICD-10-CM

## 2018-04-12 DIAGNOSIS — Z85.3 HISTORY OF BREAST CANCER: ICD-10-CM

## 2018-04-12 PROCEDURE — 3078F DIAST BP <80 MM HG: CPT | Mod: CPTII,S$GLB,, | Performed by: NURSE PRACTITIONER

## 2018-04-12 PROCEDURE — 99386 PREV VISIT NEW AGE 40-64: CPT | Mod: S$GLB,,, | Performed by: NURSE PRACTITIONER

## 2018-04-12 PROCEDURE — 99999 PR PBB SHADOW E&M-EST. PATIENT-LVL III: CPT | Mod: PBBFAC,,, | Performed by: NURSE PRACTITIONER

## 2018-04-12 PROCEDURE — 99999 PR PBB SHADOW E&M-EST. PATIENT-LVL IV: CPT | Mod: PBBFAC,,, | Performed by: INTERNAL MEDICINE

## 2018-04-12 PROCEDURE — 3074F SYST BP LT 130 MM HG: CPT | Mod: CPTII,S$GLB,, | Performed by: INTERNAL MEDICINE

## 2018-04-12 PROCEDURE — 3074F SYST BP LT 130 MM HG: CPT | Mod: CPTII,S$GLB,, | Performed by: NURSE PRACTITIONER

## 2018-04-12 PROCEDURE — 99214 OFFICE O/P EST MOD 30 MIN: CPT | Mod: S$GLB,,, | Performed by: INTERNAL MEDICINE

## 2018-04-12 PROCEDURE — 88175 CYTOPATH C/V AUTO FLUID REDO: CPT

## 2018-04-12 PROCEDURE — 3078F DIAST BP <80 MM HG: CPT | Mod: CPTII,S$GLB,, | Performed by: INTERNAL MEDICINE

## 2018-04-12 NOTE — PROGRESS NOTES
"Subjective:       Patient ID: Ermelinda Verde is a 57 y.o. female.    Chief Complaint: Blood Pressure Check    HPI  Pt was seen on 3/8/18 but at that time, she's ran out of BP meds. At visit in march, restarted on lotrel 10-40mg daily. Previously on metoprolol.    Last menses was over a yr ago. Just saw gyn today.   Stopped fergon at the last visit. hgb 12 - improved from baseline.     Vit D def.     A1C 3/8/18 - 6.     Review of Systems   Constitutional: Negative for activity change and unexpected weight change.   HENT: Negative for hearing loss, rhinorrhea and trouble swallowing.    Eyes: Negative for discharge and visual disturbance.   Respiratory: Negative for chest tightness and wheezing.    Cardiovascular: Negative for chest pain and palpitations.   Gastrointestinal: Negative for blood in stool, constipation, diarrhea and vomiting.   Endocrine: Negative for polydipsia and polyuria.   Genitourinary: Negative for difficulty urinating, dysuria, hematuria and menstrual problem.   Musculoskeletal: Negative for arthralgias, joint swelling and neck pain.   Neurological: Negative for weakness and headaches.   Psychiatric/Behavioral: Negative for confusion and dysphoric mood.         Objective:      Physical Exam    /70   Pulse 89   Temp 98 °F (36.7 °C) (Oral)   Resp 15   Ht 5' 5" (1.651 m)   Wt 88.6 kg (195 lb 4.8 oz)   BMI 32.50 kg/m²     GEN - A+OX4, NAD   HEENT - PERRL, EOMI, OP clear. MMM.   Neck - No thyromegaly or cervical LAD. No thyroid masses felt.  CV - RRR, no m/r   Chest - CTAB, no wheezing or rhonchi  Abd - S/NT/ND/+BS.   Ext - 2+BDP and radial pulses. No LE edema.   Skin - keloid at previous port site at the right upper chest    Labs reviewed w/ pt.    Assessment/Plan     Ermelinda was seen today for blood pressure check.    Diagnoses and all orders for this visit:    Benign essential hypertension - Stable and controlled. Continue current medications. Will not add metoprolol back at this time. "     Iron deficiency - improving. Off fergon. Recheck CBC, iron in June. If truly iron deficient, then will need further work up. No menses x 1 yr now. Denies any melena, hematochezia, hematuria, etc.   -     Iron and TIBC; Future  -     Ferritin; Future  -     CBC auto differential; Future    Pre-diabetes - discussed diet and exercise.     Vitamin D deficiency - vit D 1000 units daily.    Keloid - recommended derm w/ intralesional steroid injection. Defers at this time. Try Mederma OTC.     Hep c screening given age w/ Hep C antibody.    Follow-up in about 6 months (around 10/12/2018).      Reta Weeks MD  Department of Internal Medicine - [facility]  11:29 AM

## 2018-04-12 NOTE — PROGRESS NOTES
HISTORY OF PRESENT ILLNESS:    Ermelinda Verde is a 57 y.o. female [id number], presents for a routine exam and has no gyn complaints.      Past Medical History:   Diagnosis Date    Breast cancer 2017    left    Depression     Hypertension     Vitamin B12 deficiency 3/8/2018       Past Surgical History:   Procedure Laterality Date    BREAST BIOPSY Left     BREAST RECONSTRUCTION Bilateral      SECTION      D&C      MASTECTOMY Left     MYOMECTOMY      PORTACATH PLACEMENT      SENTINEL LYMPH NODE BIOPSY  2017    left        MEDICATIONS AND ALLERGIES:      Current Outpatient Prescriptions:     amlodipine-benazepril (LOTREL) 10-40 mg per capsule, TK 1 C PO QD, Disp: 90 capsule, Rfl: 3    CALCIUM CARBONATE (CALCIUM 500 ORAL), Take by mouth., Disp: , Rfl:     cyanocobalamin (VITAMIN B-12) 500 MCG tablet, Take 500 mcg by mouth once daily., Disp: , Rfl:     DOCUSATE CALCIUM (STOOL SOFTENER ORAL), Take by mouth., Disp: , Rfl:     ferrous gluconate (FERGON) 324 MG tablet, TK 1 T PO QD, Disp: , Rfl: 3    Review of patient's allergies indicates:  No Known Allergies    Family History   Problem Relation Age of Onset    Heart disease Father     Hypertension Father     Breast cancer Sister      at age 52 or 53    Hypertension Mother     No Known Problems Brother     Thyroid disease Daughter      hyperthyroidism s/p thyroidectomy    No Known Problems Son     No Known Problems Brother     No Known Problems Brother     No Known Problems Sister     No Known Problems Daughter     Colon cancer Neg Hx     Ovarian cancer Neg Hx        Social History     Social History    Marital status:      Spouse name: N/A    Number of children: N/A    Years of education: N/A     Occupational History    banking      Social History Main Topics    Smoking status: Never Smoker    Smokeless tobacco: Never Used    Alcohol use Yes      Comment: wine    Drug use: No    Sexual activity: Yes      "Partners: Male     Birth control/ protection: Post-menopausal     Other Topics Concern    Not on file     Social History Narrative    No narrative on file       OB HISTORY: Number of C/S:  Number of EAB:1    COMPREHENSIVE GYN HISTORY:  PAP History:  Denies abnormal Paps. States last pap negative by Gyn in [place] one year ago. Records not in media.  Infection History: Denies STDs. Denies PID.  Benign History: Reports uterine fibroids. Denies ovarian cysts. Denies endometriosis. Denies other conditions.  Cancer History: Denies cervical cancer. Denies uterine cancer or hyperplasia. Denies ovarian cancer. Denies vulvar cancer or pre-cancer. Denies vaginal cancer or pre-cancer. Reports breast cancer: 2017. Treatment: Surgery, Chemo. Denies colon cancer.  Sexual Activity History: Reports currently being sexually active  Menstrual History: Denies menses. Pt is  not on HRT.     ROS:  GENERAL: No weight changes. No swelling. + FATIGUE. No fever.  CARDIOVASCULAR: No chest pain. No shortness of breath. No leg cramps.   NEUROLOGICAL: No headaches. No vision changes.  BREASTS: No pain. No lumps. No discharge.  ABDOMEN: No pain. No nausea. No vomiting. No diarrhea. No constipation.  REPRODUCTIVE: No abnormal bleeding.   VULVA: No pain. No lesions. No itching.  VAGINA: No relaxation. No itching. No odor. No discharge. No lesions.  URINARY: No incontinence. No nocturia. No frequency. No dysuria.    /74   Ht 5' 5" (1.651 m)   Wt 88 kg (193 lb 14.4 oz)   BMI 32.27 kg/m²     PE:  APPEARANCE: Well nourished, well developed, in no acute distress.  AFFECT: WNL, alert and oriented x 3.  SKIN: No hirsutism or acne.  NECK: Neck symmetric without masses or thyromegaly.  NODES: No inguinal, cervical, axillary or femoral lymph node enlargement.  CHEST: Good respiratory effort.   ABDOMEN: Soft. No tenderness or masses. OBESE.  BREASTS: Symmetrical, no nipple discharge bilaterally.BILATERAL RECONSTRUCTION SCARS.  2 CM NON PAINFUL " "NODULE LEFT BREAST INNER LOWER QUADRANT NEAR STERNUM "OIL CYST" PER MAMMOGRAM 4-10-18.  PELVIC: ATROPHIC EXTERNAL FEMALE GENITALIA without lesions. Normal hair distribution. Adequate perineal body, normal urethral meatus. VAGINA DRY / ATROPHIC without lesions or discharge. CERVIX STENOTIC without lesions, discharge or tenderness. No significant cystocele or rectocele. Bimanual exam shows uterus to be 10-12 WEEK SIZE, regular, mobile and nontender. Adnexa without masses or tenderness. EXAM DIFFICULT DUE TO BODY HABITUS.  RECTAL: Rectovaginal exam confirms above with normal sphincter tone, no masses.  EXTREMITIES: No edema.    DIAGNOSIS:  1. Well woman exam with routine gynecological exam    2. Postmenopause    3. History of uterine fibroid    4. History of breast cancer        PLAN:    Orders Placed This Encounter    Liquid-based pap smear, screening   Up to date on mammogram and states up to date on colonoscopy    COUNSELING:  The patient was counseled today on:  -A.C.S. Pap and pelvic exam guidelines (pap every 3 years, no pap after age 65) and recommendations for yearly mammogram;  -to see her PCP for other health maintenance.    FOLLOW-UP annually and pt prefers to see Gyn provider in [place].    "

## 2018-04-12 NOTE — LETTER
April 12, 2018      Reta Weeks MD  [address]  [facility] [address]           [facility] - OB/[facility]N 5th Floor  [address]  [facility] [address]  Phone: [phone]          Patient: Ermelinda Verde   MR Number: [id number]   YOB: 1960   Date of Visit: 4/12/2018       Dear Dr. Reta Weeks:    Thank you for referring Ermelinda Verde to me for evaluation. Attached you will find relevant portions of my assessment and plan of care.    If you have questions, please do not hesitate to call me. I look forward to following Ermelinda Verde along with you.    Sincerely,    MAREN Marrufo, NP    Enclosure  CC:  No Recipients    If you would like to receive this communication electronically, please contact [email or web address] or [phone] to request more information on [employer] Link access.    For providers and/or their staff who would like to refer a patient to [facility], please contact us through our one-stop-shop provider referral line, [facility], at [phone].    If you feel you have received this communication in error or would no longer like to receive these types of communications, please e-mail [email or web address]

## 2018-04-18 ENCOUNTER — DOCUMENTATION ONLY (OUTPATIENT)
Dept: INTERNAL MEDICINE | Facility: CLINIC | Age: 58
End: 2018-04-18

## 2018-04-18 NOTE — PROGRESS NOTES
Outside records received from [facility]    Dr. Olga Vazquez 4/17/17  Assessment and plan-hypertensive heart disease without heart failure.  Start on amlodipine benign Cipro 10-40 milligrams daily.  Start Toprol-XL 50 mg half tablet daily.  Started on ferrous gluconate 324 mg 1 tablet daily.*Potassium chloride 20 mEq 1 tablet daily.  Start hydrochlorothiazide 25 mg once daily.  (please note that although this office note says start, it looks like pt has been on this medicine since at least 2014.    Labs 2/6/13  WBC 5.1, hemoglobin 9.8, hematocrit 32.8, RDW 18.8, MCV 85, platelets 333.  Iron 19 (L), iron sat 4% (L), TIBC 442 (WNL), B12 103, ferritin 7.    Labs 2012  Total cholesterol 187, triglycerides 55, HDL 68,   Glucose 92, BUNs 8, creatinine 0.72, sodium 138, potassium 3.9, chloride 102, bicarbonate 20, calcium 9.2  WBC 5.9, hemoglobin 10.2, hematocrit 33.6, MCV 84, RDW 17.7, platelets 339.    H/o abscess in 2013 and 2011.    Last set of labs done 3/28/17  CBC  WBC 10.31, hemoglobin 11.5, hematocrit 35.2, MCV 81, RDW 15.6, platelets 351  CMP-sodium 139, potassium 3.8, chloride 108, bicarbonate 23, anion gap 8, BUNs 8, creatinine 0.8, glucose 143, calcium 9.4, alkaline phosphatase 92, total protein 7.4, albumin 3.4, total bilirubin 0.2, AST 15, ALT 9.    EKG 5/31/16-normal sinus rhythm.  Nonspecific T wave abnormality.  Prolonged QT-.    EGD/colonoscopy 11/8/11-gastric fundal polyp, gastritis, hemorrhoids, diverticulosis.  Repeat colonoscopy in 10 years.    Carotid ultrasound 7/11/08-patent bilateral carotid bifurcations with no evidence of significant stenosis.  Antegrade bilateral vertebral flow.

## 2018-05-15 ENCOUNTER — TELEPHONE (OUTPATIENT)
Dept: SURGERY | Facility: CLINIC | Age: 58
End: 2018-05-15

## 2018-05-15 ENCOUNTER — OFFICE VISIT (OUTPATIENT)
Dept: SURGERY | Facility: CLINIC | Age: 58
End: 2018-05-15
Payer: COMMERCIAL

## 2018-05-15 VITALS
HEART RATE: 88 BPM | DIASTOLIC BLOOD PRESSURE: 78 MMHG | SYSTOLIC BLOOD PRESSURE: 147 MMHG | WEIGHT: 196 LBS | TEMPERATURE: 99 F | HEIGHT: 65 IN | BODY MASS INDEX: 32.65 KG/M2

## 2018-05-15 DIAGNOSIS — C50.412 MALIGNANT NEOPLASM OF UPPER-OUTER QUADRANT OF LEFT BREAST IN FEMALE, ESTROGEN RECEPTOR NEGATIVE: Primary | ICD-10-CM

## 2018-05-15 DIAGNOSIS — Z17.1 MALIGNANT NEOPLASM OF UPPER-OUTER QUADRANT OF LEFT BREAST IN FEMALE, ESTROGEN RECEPTOR NEGATIVE: Primary | ICD-10-CM

## 2018-05-15 PROCEDURE — 3077F SYST BP >= 140 MM HG: CPT | Mod: CPTII,S$GLB,, | Performed by: SURGERY

## 2018-05-15 PROCEDURE — 99999 PR PBB SHADOW E&M-EST. PATIENT-LVL III: CPT | Mod: PBBFAC,,, | Performed by: SURGERY

## 2018-05-15 PROCEDURE — 3078F DIAST BP <80 MM HG: CPT | Mod: CPTII,S$GLB,, | Performed by: SURGERY

## 2018-05-15 PROCEDURE — 99214 OFFICE O/P EST MOD 30 MIN: CPT | Mod: S$GLB,,, | Performed by: SURGERY

## 2018-05-15 PROCEDURE — 3008F BODY MASS INDEX DOCD: CPT | Mod: CPTII,S$GLB,, | Performed by: SURGERY

## 2018-05-15 NOTE — Clinical Note
Mrs. Verde has completed therapy and recon.  She is doing very well!  I'll continue to perform her surveillance for now.  Thanks again for sending her my way!  Regina

## 2018-05-15 NOTE — PROGRESS NOTES
Breast Surgery  [facility]  Department of Surgery        REFERRING PROVIDER: Alfredo English     Chief Complaint: Surveillance follow up        Subjective:      Patient ID: Ermelinda Verde is a 56 y.o. female who presents s/p left skin sparing total mastectomy with SCOTT flap reconstruction on 6/26/17.      Due to the fact that she had residual malignancy at time of surgery, she received additional chemotherapy with single agent Adriamycin for 4 cycles given in a dose dense fashion.    Last mammogram was April 2018 which was negative except for small cyst-appearing structure in lower inner quadrant left breast, which is palpable, but not growing per pt.     Oncologic History:  She developed a palpable abnormality in her left breast in January 2017 which she noted on self-examination. A diagnostic mammogram on January 19 showed a greater than 1 cm nodule in the upper outer portion of left breast.  By ultrasound this was lobulated and hypoechoic measuring 1.75 x 1.51 x 1.96 cm. On January 24, 2017 a core needle biopsy was performed which showed infiltrating ductal carcinoma, high grade.  The tumor was ER negative, MD negative, and HER-2 negative.  A follow-up ultrasound on December 6 showed 2.5 x 2.2 x 1.5 cm left breast mass.  There was no abnormality noted in the left axilla. She underwent sentinel lymph node biopsy on February 22.  That showed 4 negative lymph nodes.     She had 4 cycles of  Wilbur-adjuvantTaxotere and Cytoxan completed  on 5/9/17.     On June 26 she underwent left mastectomy.  That revealed 2 foci of invasive high-grade carcinoma measuring 14 mm and 1.5 mm.  Margins were negative.     She completed 4 cycles of adjuvant Adriamycin on September 12, 2017.     Today she reports she has some fatigue but is doing some exercise on her stationary bike, about 15 minutes at a time, not as often as she would like.  She has some achiness in the morning when she first wakes up.  She denies any shortness of  breath.  Appetite and bowel function have been good.    BRCA 1/2 negative.    Interval history:  Patient states she is doing well today. She arrives to clinic for her routine surveillance visit with clinical breast exam. Most recent left mammogram in April showed a 19 mm fat-containing, round mass seen in the lower inner quadrant of the left breast. Ms Verde said that she first noticed this area as a palpable mass in late February and went to see Dr. Sewell. She then followed up with Dr. Reyna who ordered a mammogram to evaluate the mass. She denies any drainage from the mass, skin changes, new lumps otherwise. States it has not grown in size.          Past Medical History:   Diagnosis Date    Cancer       breast    Hypertension              Past Surgical History:   Procedure Laterality Date    BREAST SURGERY   2017     left breast bx     SECTION        D&C        PORTACATH PLACEMENT        SENTINEL LYMPH NODE BIOPSY   2017     left    UTERINE FIBROID SURGERY                 Current Outpatient Prescriptions on File Prior to Visit   Medication Sig Dispense Refill    amlodipine-benazepril (LOTREL) 10-40 mg per capsule TK 1 C PO QD   3    CALCIUM CARBONATE (CALCIUM 500 ORAL) Take by mouth.        cyanocobalamin (VITAMIN B-12) 500 MCG tablet Take 500 mcg by mouth once daily.        DOCUSATE CALCIUM (STOOL SOFTENER ORAL) Take by mouth.        hydrochlorothiazide (HYDRODIURIL) 25 MG tablet TK 1 T PO ONCE A DAY   3    metoprolol succinate (TOPROL-XL) 50 MG 24 hr tablet TK 1/2 T PO QD   3    potassium chloride SA (K-DUR,KLOR-CON) 20 MEQ tablet TK 1 T PO  QD   3    ferrous gluconate (FERGON) 324 MG tablet TK 1 T PO QD   3      No current facility-administered medications on file prior to visit.       Social History   Social History            Social History    Marital status:        Spouse name: N/A    Number of children: N/A    Years of education: N/A          Occupational History  "   Not on file.             Social History Main Topics    Smoking status: Never Smoker    Smokeless tobacco: Not on file    Alcohol use Yes         Comment: wine    Drug use: No    Sexual activity: Not on file           Other Topics Concern    Not on file          Social History Narrative    No narrative on file                Family History   Problem Relation Age of Onset    Heart disease Father      Breast cancer Sister         at age 52 or 53         Review of Systems  Objective:   /68 (BP Location: Right arm, Patient Position: Sitting, BP Method: Automatic)   Pulse 98   Temp 98.4 °F (36.9 °C) (Oral)   Ht 5' 5" (1.651 m)   Wt 89.8 kg (198 lb)   BMI 32.95 kg/m²      Physical Exam   Constitutional: She is oriented to person, place, and time. She appears well-developed and well-nourished.   Neck: Normal range of motion. Neck supple.   Cardiovascular: Normal rate, regular rhythm and normal heart sounds.    Pulmonary/Chest: Effort normal and breath sounds normal.   Well healed incisional scars of the left and right breast around the nipple and inferior to the nipple. Approximately 2cm, freely mobile palpable mass in the lower inner quadrant of the left breast flap.  No additional masses, no skin lesions, no nipple inversion or discharge and no LAD.  Abdominal: Soft. Bowel sounds are normal.   Musculoskeletal: Normal range of motion.   Neurological: She is alert and oriented to person, place, and time.         Imaging:  Mammogram 4/10/18  Findings:  This procedure was performed using tomosynthesis.  Computer-aided detection was utilized in the interpretation of this examination.  The breast is heterogeneously dense, which may obscure small masses.     There is a 19 mm fat-containing, round mass seen in the lower inner quadrant of the left breast. The mass correlates with a palpable mass reported by the patient. This finding is compatible with an oil cyst.      Impression:  There is no mammographic " evidence of malignancy.     BI-RADS Category:   Left: 2 - Benign  Overall: 2 - Benign     Recommendation:  Return to annual screening mammogram schedule is recommended of the right breast.     Assessment:       Ms. 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  Also encouraged her to get back to her normal routine now that her active treatment phase is complete.  - She will continue follow up with Dr. Sewell and is happy with her result.

## 2018-05-15 NOTE — TELEPHONE ENCOUNTER
Return call to pt. Pt states that she is on her way, but may be aprroximately 15 minutes late for her appt.. Wanted to make sure she can still keep appt. Advised pt to come in as planned.

## 2018-05-15 NOTE — TELEPHONE ENCOUNTER
----- Message from Margarito Waller sent at 5/15/2018  1:05 PM CDT -----  [phone]//pt states that she will be mins late and wanted to know if that will be ok/please call/thank you

## 2018-05-18 DIAGNOSIS — C50.412 MALIGNANT NEOPLASM OF UPPER-OUTER QUADRANT OF LEFT BREAST IN FEMALE, ESTROGEN RECEPTOR NEGATIVE: Primary | ICD-10-CM

## 2018-05-18 DIAGNOSIS — Z17.1 MALIGNANT NEOPLASM OF UPPER-OUTER QUADRANT OF LEFT BREAST IN FEMALE, ESTROGEN RECEPTOR NEGATIVE: Primary | ICD-10-CM

## 2018-06-19 ENCOUNTER — LAB VISIT (OUTPATIENT)
Dept: LAB | Facility: HOSPITAL | Age: 58
End: 2018-06-19
Attending: INTERNAL MEDICINE
Payer: COMMERCIAL

## 2018-06-19 DIAGNOSIS — E61.1 IRON DEFICIENCY: ICD-10-CM

## 2018-06-19 DIAGNOSIS — Z11.59 NEED FOR HEPATITIS C SCREENING TEST: ICD-10-CM

## 2018-06-19 LAB
BASOPHILS # BLD AUTO: 0.04 K/UL
BASOPHILS NFR BLD: 0.7 %
DIFFERENTIAL METHOD: ABNORMAL
EOSINOPHIL # BLD AUTO: 0.2 K/UL
EOSINOPHIL NFR BLD: 2.7 %
ERYTHROCYTE [DISTWIDTH] IN BLOOD BY AUTOMATED COUNT: 15.4 %
FERRITIN SERPL-MCNC: 34 NG/ML
HCT VFR BLD AUTO: 39.8 %
HGB BLD-MCNC: 12.9 G/DL
IRON SERPL-MCNC: 65 UG/DL
LYMPHOCYTES # BLD AUTO: 2.2 K/UL
LYMPHOCYTES NFR BLD: 36.9 %
MCH RBC QN AUTO: 27.2 PG
MCHC RBC AUTO-ENTMCNC: 32.4 G/DL
MCV RBC AUTO: 84 FL
MONOCYTES # BLD AUTO: 0.5 K/UL
MONOCYTES NFR BLD: 8.5 %
NEUTROPHILS # BLD AUTO: 3.1 K/UL
NEUTROPHILS NFR BLD: 51.2 %
PLATELET # BLD AUTO: 243 K/UL
PMV BLD AUTO: 9 FL
RBC # BLD AUTO: 4.74 M/UL
SATURATED IRON: 14 %
TOTAL IRON BINDING CAPACITY: 453 UG/DL
TRANSFERRIN SERPL-MCNC: 306 MG/DL
WBC # BLD AUTO: 5.97 K/UL

## 2018-06-19 PROCEDURE — 82728 ASSAY OF FERRITIN: CPT

## 2018-06-19 PROCEDURE — 36415 COLL VENOUS BLD VENIPUNCTURE: CPT

## 2018-06-19 PROCEDURE — 85025 COMPLETE CBC W/AUTO DIFF WBC: CPT

## 2018-06-19 PROCEDURE — 83540 ASSAY OF IRON: CPT

## 2018-06-19 PROCEDURE — 86803 HEPATITIS C AB TEST: CPT

## 2018-06-20 LAB — HCV AB SERPL QL IA: NEGATIVE

## 2018-07-09 NOTE — PROGRESS NOTES
Subjective:       Patient ID: Ermelinda Verde is a 57 y.o. female.    Chief Complaint: No chief complaint on file.    Mrs Verde is  a very pleasant 57-year-old -American female who returns for a diagnosis of left breast cancer.  She is a patient of Dr. Rose and also a patient of .    She has started exercising and has been walking about every other day.  Overall she has been feeling well.  She has some occasional low back pain.    Appetite and bowel function has been good she has no shortness of breath.  She had the oral cyst from her left breast reconstruction aspirated which reduced in size.    Last mammogram was November 2017.      Onc History:  She developed a palpable abnormality in her left breast in January 2017 which she noted on self-examination.  A diagnostic mammogram on January 19 showed a greater than 1 cm nodule in the upper outer portion of left breast.  By ultrasound this was lobulated and hypoechoic measuring 1.75 x 1.51 x 1.96 cm.     On January 24, 2017 a core needle biopsy was performed which showed infiltrating ductal carcinoma, high grade.  The tumor was ER negative, SD negative, and HER-2 negative.  A follow-up ultrasound on December 6 showed 2.5 x 2.2 x 1.5 cm left breast mass.  There was no abnormality noted in the left axilla.     She underwent sentinel lymph node biopsy on February 22.  That showed 4 negative lymph nodes.     She had 4 cycles of  Wilbur-adjuvantTaxotere and Cytoxan completed  on 5/9/17.     On June 26 she underwent left mastectomy.  That revealed 2 foci of invasive high-grade carcinoma measuring 14 mm and 1.5 mm.  Margins were negative.    She completed 4 cycles of adjuvant Adriamycin on September 12, 2017.          Review of Systems   Constitutional: Positive for fatigue. Negative for activity change, appetite change, fever and unexpected weight change.   HENT: Negative for mouth sores.    Eyes: Negative for visual disturbance.   Respiratory: Negative  for cough and shortness of breath.    Cardiovascular: Negative for chest pain.   Gastrointestinal: Positive for constipation. Negative for abdominal pain, diarrhea, nausea and vomiting.   Genitourinary: Negative for frequency.   Musculoskeletal: Negative for back pain.   Skin: Negative for pallor and rash.   Neurological: Positive for numbness. Negative for headaches.   Hematological: Negative for adenopathy.   Psychiatric/Behavioral: The patient is not nervous/anxious.        Objective:      Physical Exam   Constitutional: She is oriented to person, place, and time. She appears well-developed and well-nourished. No distress.   ECOG 0  Presents with      HENT:   Mouth/Throat: Oropharynx is clear and moist. No oropharyngeal exudate.   Eyes: No scleral icterus.   Cardiovascular: Normal rate and regular rhythm.    Pulmonary/Chest: Effort normal and breath sounds normal. No respiratory distress. Right breast exhibits no mass, no nipple discharge and no skin change.       Right breast without mass, nodule or skin changes. Left breast without mass, nodule or skin changes. No axillary or supraclavicular adenopathy   Abdominal: Soft. She exhibits no distension and no mass. There is no tenderness.   Musculoskeletal:        Lymphadenopathy:     She has no cervical adenopathy.   Neurological: She is alert and oriented to person, place, and time.   Skin: Skin is warm.   Psychiatric: She has a normal mood and affect. Her behavior is normal. Thought content normal.   Vitals reviewed.      Assessment:       1. Malignant neoplasm of upper-outer quadrant of left breast in female, estrogen receptor negative      2. oil cyst - left reconstruction  Plan:       Return to clinic in 3 months.  Distress Screening Results: Psychosocial Distress screening score of Distress Score: 3 noted and reviewed. No intervention indicated.

## 2018-07-10 ENCOUNTER — OFFICE VISIT (OUTPATIENT)
Dept: HEMATOLOGY/ONCOLOGY | Facility: CLINIC | Age: 58
End: 2018-07-10
Payer: COMMERCIAL

## 2018-07-10 VITALS
BODY MASS INDEX: 33.02 KG/M2 | RESPIRATION RATE: 17 BRPM | WEIGHT: 198.44 LBS | HEART RATE: 84 BPM | SYSTOLIC BLOOD PRESSURE: 139 MMHG | TEMPERATURE: 98 F | DIASTOLIC BLOOD PRESSURE: 74 MMHG

## 2018-07-10 DIAGNOSIS — Z17.1 MALIGNANT NEOPLASM OF UPPER-OUTER QUADRANT OF LEFT BREAST IN FEMALE, ESTROGEN RECEPTOR NEGATIVE: Primary | ICD-10-CM

## 2018-07-10 DIAGNOSIS — C50.412 MALIGNANT NEOPLASM OF UPPER-OUTER QUADRANT OF LEFT BREAST IN FEMALE, ESTROGEN RECEPTOR NEGATIVE: Primary | ICD-10-CM

## 2018-07-10 PROCEDURE — 99999 PR PBB SHADOW E&M-EST. PATIENT-LVL III: CPT | Mod: PBBFAC,,, | Performed by: INTERNAL MEDICINE

## 2018-07-10 PROCEDURE — 3075F SYST BP GE 130 - 139MM HG: CPT | Mod: CPTII,S$GLB,, | Performed by: INTERNAL MEDICINE

## 2018-07-10 PROCEDURE — 3078F DIAST BP <80 MM HG: CPT | Mod: CPTII,S$GLB,, | Performed by: INTERNAL MEDICINE

## 2018-07-10 PROCEDURE — 3008F BODY MASS INDEX DOCD: CPT | Mod: CPTII,S$GLB,, | Performed by: INTERNAL MEDICINE

## 2018-07-10 PROCEDURE — 99213 OFFICE O/P EST LOW 20 MIN: CPT | Mod: S$GLB,,, | Performed by: INTERNAL MEDICINE

## 2018-09-25 ENCOUNTER — PATIENT MESSAGE (OUTPATIENT)
Dept: SURGERY | Facility: CLINIC | Age: 58
End: 2018-09-25

## 2018-10-08 NOTE — PROGRESS NOTES
Subjective:       Patient ID: Ermelinda Verde is a 57 y.o. female.    Chief Complaint: Malignant neoplasm of upper-outer quadrant of left breast in    Mrs Verde is  a very pleasant 57-year-old -American female who returns for a diagnosis of left breast cancer.  She is a patient of Dr. Rose and also a patient of .    Patient overall feeling well and without pain. No fever, chills, nausea, vomiting, shortness of breath or weight loss. Some intermittent left breast discomfort since surgery but nothing acute or off of baseline.  She has not been exercising as much as she would like.      Appetite and bowel function has been good she has no shortness of breath.  She had the oral cyst from her left breast reconstruction aspirated which reduced in size.     Last mammogram was November 2017.      Onc History:  She developed a palpable abnormality in her left breast in January 2017 which she noted on self-examination.  A diagnostic mammogram on January 19 showed a greater than 1 cm nodule in the upper outer portion of left breast.  By ultrasound this was lobulated and hypoechoic measuring 1.75 x 1.51 x 1.96 cm.     On January 24, 2017 a core needle biopsy was performed which showed infiltrating ductal carcinoma, high grade.  The tumor was ER negative, HI negative, and HER-2 negative.  A follow-up ultrasound on December 6 showed 2.5 x 2.2 x 1.5 cm left breast mass.  There was no abnormality noted in the left axilla.     She underwent sentinel lymph node biopsy on February 22.  That showed 4 negative lymph nodes.     She had 4 cycles of  Wilbur-adjuvantTaxotere and Cytoxan completed  on 5/9/17.     On June 26 she underwent left mastectomy.  That revealed 2 foci of invasive high-grade carcinoma measuring 14 mm and 1.5 mm.  Margins were negative.     She completed 4 cycles of adjuvant Adriamycin on September 12, 2017.             Review of Systems   Constitutional: Positive for fatigue. Negative for activity  change, appetite change, chills, diaphoresis, fever and unexpected weight change.   HENT: Negative for congestion, rhinorrhea, sore throat and trouble swallowing.    Eyes: Negative for visual disturbance.   Respiratory: Negative for cough, chest tightness and shortness of breath.    Cardiovascular: Negative for chest pain, palpitations and leg swelling.   Gastrointestinal: Negative for abdominal pain, blood in stool, constipation, diarrhea, nausea and vomiting.   Genitourinary: Negative for dysuria, hematuria and vaginal bleeding.   Musculoskeletal: Negative for arthralgias, back pain and myalgias.   Skin: Negative for pallor and rash.   Neurological: Negative for dizziness, weakness and headaches.   Hematological: Negative for adenopathy. Does not bruise/bleed easily.   Psychiatric/Behavioral: Negative for dysphoric mood and suicidal ideas. The patient is not nervous/anxious.        Objective:      Physical Exam   Constitutional: She is oriented to person, place, and time. She appears well-developed and well-nourished. No distress.   ECOG 0  Presents alone     HENT:   Head: Normocephalic.   Mouth/Throat: Oropharynx is clear and moist. No oropharyngeal exudate.   Eyes: Conjunctivae are normal. Pupils are equal, round, and reactive to light. No scleral icterus.   Neck: Normal range of motion. Neck supple. No thyromegaly present.   Cardiovascular: Normal rate and regular rhythm.   Pulmonary/Chest: Effort normal and breath sounds normal. No respiratory distress.   Right breast without mass, nodule or skin changes. Left breast reconstructionwithout mass, nodule or skin changes.   She has a 2.5 cm left sided supraclavicular lymph node and a more posterior 1 cm supraclavicular lymph node.  No axillary adenopathy   Abdominal: Soft. Bowel sounds are normal. She exhibits no distension and no mass. There is no tenderness.   Musculoskeletal: Normal range of motion. She exhibits no edema or tenderness.   No spinal or paraspinal  tenderness to palpation     Lymphadenopathy:     She has no cervical adenopathy.   Neurological: She is alert and oriented to person, place, and time. No cranial nerve deficit.   Skin: Skin is warm and dry.   Psychiatric: She has a normal mood and affect. Her behavior is normal. Thought content normal.   Vitals reviewed.      Assessment:       1. Malignant neoplasm of upper-outer quadrant of left breast in female, estrogen receptor negative        Plan:       Clinically feeling well however with new left supraclavicular adenopathy.  Appointment arranged today to see Dr. Rose for consideration of excisional biopsy.  Distress Screening Results: Psychosocial Distress screening score of Distress Score: 9 noted and reviewed. No intervention indicated.

## 2018-10-09 ENCOUNTER — OFFICE VISIT (OUTPATIENT)
Dept: HEMATOLOGY/ONCOLOGY | Facility: CLINIC | Age: 58
End: 2018-10-09
Payer: COMMERCIAL

## 2018-10-09 ENCOUNTER — OFFICE VISIT (OUTPATIENT)
Dept: SURGERY | Facility: CLINIC | Age: 58
End: 2018-10-09
Payer: COMMERCIAL

## 2018-10-09 ENCOUNTER — HOSPITAL ENCOUNTER (OUTPATIENT)
Dept: RADIOLOGY | Facility: HOSPITAL | Age: 58
Discharge: HOME | End: 2018-10-09
Attending: SURGERY
Payer: COMMERCIAL

## 2018-10-09 VITALS
HEART RATE: 92 BPM | HEIGHT: 65 IN | SYSTOLIC BLOOD PRESSURE: 146 MMHG | WEIGHT: 198.19 LBS | RESPIRATION RATE: 16 BRPM | DIASTOLIC BLOOD PRESSURE: 86 MMHG | TEMPERATURE: 98 F | OXYGEN SATURATION: 100 % | BODY MASS INDEX: 33.02 KG/M2

## 2018-10-09 DIAGNOSIS — Z17.1 MALIGNANT NEOPLASM OF UPPER-OUTER QUADRANT OF LEFT BREAST IN FEMALE, ESTROGEN RECEPTOR NEGATIVE: ICD-10-CM

## 2018-10-09 DIAGNOSIS — Z17.1 MALIGNANT NEOPLASM OF UPPER-OUTER QUADRANT OF LEFT BREAST IN FEMALE, ESTROGEN RECEPTOR NEGATIVE: Primary | ICD-10-CM

## 2018-10-09 DIAGNOSIS — C50.412 MALIGNANT NEOPLASM OF UPPER-OUTER QUADRANT OF LEFT BREAST IN FEMALE, ESTROGEN RECEPTOR NEGATIVE: ICD-10-CM

## 2018-10-09 DIAGNOSIS — R59.0 LYMPHADENOPATHY, SUPRACLAVICULAR: Primary | ICD-10-CM

## 2018-10-09 DIAGNOSIS — C50.412 MALIGNANT NEOPLASM OF UPPER-OUTER QUADRANT OF LEFT BREAST IN FEMALE, ESTROGEN RECEPTOR NEGATIVE: Primary | ICD-10-CM

## 2018-10-09 PROCEDURE — 76882 US LMTD JT/FCL EVL NVASC XTR: CPT | Mod: 26,,, | Performed by: RADIOLOGY

## 2018-10-09 PROCEDURE — 99212 OFFICE O/P EST SF 10 MIN: CPT | Mod: S$GLB,,, | Performed by: SURGERY

## 2018-10-09 PROCEDURE — 99999 PR PBB SHADOW E&M-EST. PATIENT-LVL III: CPT | Mod: PBBFAC,,, | Performed by: PHYSICIAN ASSISTANT

## 2018-10-09 PROCEDURE — 76882 US LMTD JT/FCL EVL NVASC XTR: CPT | Mod: TC,PO

## 2018-10-09 PROCEDURE — 3077F SYST BP >= 140 MM HG: CPT | Mod: CPTII,S$GLB,, | Performed by: PHYSICIAN ASSISTANT

## 2018-10-09 PROCEDURE — 3079F DIAST BP 80-89 MM HG: CPT | Mod: CPTII,S$GLB,, | Performed by: PHYSICIAN ASSISTANT

## 2018-10-09 PROCEDURE — 3078F DIAST BP <80 MM HG: CPT | Mod: CPTII,S$GLB,, | Performed by: SURGERY

## 2018-10-09 PROCEDURE — 3077F SYST BP >= 140 MM HG: CPT | Mod: CPTII,S$GLB,, | Performed by: SURGERY

## 2018-10-09 PROCEDURE — 99214 OFFICE O/P EST MOD 30 MIN: CPT | Mod: S$GLB,,, | Performed by: PHYSICIAN ASSISTANT

## 2018-10-09 PROCEDURE — 3008F BODY MASS INDEX DOCD: CPT | Mod: CPTII,S$GLB,, | Performed by: PHYSICIAN ASSISTANT

## 2018-10-09 PROCEDURE — 99999 PR PBB SHADOW E&M-EST. PATIENT-LVL II: CPT | Mod: PBBFAC,,, | Performed by: SURGERY

## 2018-10-09 RX ORDER — CHOLECALCIFEROL (VITAMIN D3) 25 MCG
1000 TABLET ORAL DAILY
Status: ON HOLD | COMMUNITY
End: 2019-02-13 | Stop reason: HOSPADM

## 2018-10-09 NOTE — Clinical Note
3 weeks with Axel to review path from excisional biopsy (which is hopefully being set up by Dr. Rose's office)

## 2018-10-09 NOTE — Clinical Note
Rashid- haven't had time to do note but she has new left sided supraclavicular nodes- probably needs excisional biopsy. Axel wanted her to see you first and then we can do imaging. Thanks for seeing her today-siobhan

## 2018-10-09 NOTE — Clinical Note
I think Tri spoke with you about this.  Would you be ok with an FNA of this node?  It is almost certainly recurrence.I think it is too close to vessels for CNB.I can set her up for complete excision, but felt like if we could at least get FNA, that may be sufficient.Obtained a PET too.

## 2018-10-11 ENCOUNTER — HOSPITAL ENCOUNTER (OUTPATIENT)
Dept: RADIOLOGY | Facility: HOSPITAL | Age: 58
Discharge: HOME | End: 2018-10-11
Attending: SURGERY
Payer: COMMERCIAL

## 2018-10-11 DIAGNOSIS — C50.412 MALIGNANT NEOPLASM OF UPPER-OUTER QUADRANT OF LEFT BREAST IN FEMALE, ESTROGEN RECEPTOR NEGATIVE: ICD-10-CM

## 2018-10-11 DIAGNOSIS — R59.0 LYMPHADENOPATHY, SUPRACLAVICULAR: ICD-10-CM

## 2018-10-11 DIAGNOSIS — Z17.1 MALIGNANT NEOPLASM OF UPPER-OUTER QUADRANT OF LEFT BREAST IN FEMALE, ESTROGEN RECEPTOR NEGATIVE: ICD-10-CM

## 2018-10-11 PROCEDURE — 78815 PET IMAGE W/CT SKULL-THIGH: CPT | Mod: TC

## 2018-10-11 PROCEDURE — A9552 F18 FDG: HCPCS

## 2018-10-11 PROCEDURE — 78815 PET IMAGE W/CT SKULL-THIGH: CPT | Mod: 26,PI,, | Performed by: RADIOLOGY

## 2018-10-12 ENCOUNTER — TELEPHONE (OUTPATIENT)
Dept: RADIOLOGY | Facility: HOSPITAL | Age: 58
End: 2018-10-12

## 2018-10-12 LAB — POCT GLUCOSE: 115 MG/DL (ref 70–110)

## 2018-10-12 NOTE — TELEPHONE ENCOUNTER
Spoke with patient. Reviewed breast FNA procedure and reviewed instructions for breast FNA procedure. Patient expressed understanding and all questions were answered. Provided patient with my phone number to call for any further concerns or questions.   Patient scheduled breast FNA at the [facility] on 10/19/18

## 2018-10-14 NOTE — PROGRESS NOTES
Breast Surgery  [facility]  Department of Surgery        REFERRING PROVIDER: Alfredo English     Chief Complaint: Surveillance follow up        Subjective:      Patient ID: Ermelinda Verde is a 56 y.o. female who presents with a new palpable supraclavicular node detected in surveillance exam today by Tri Song.  She had not noticed this before today.  She has been working and in her usual state of health.  No CP, SOB, Back or bony pain, HA or vision changes.    She is s/p left skin sparing total mastectomy with SCOTT flap reconstruction (Dr. Sewell) on 6/26/17.      Due to the fact that she had residual malignancy at time of surgery, she received additional chemotherapy with single agent Adriamycin for 4 cycles given in a dose dense fashion.    Last mammogram was April 2018 which was negative except for small cyst-appearing structure in lower inner quadrant left breast, which is palpable, but not growing per pt.     Oncologic History:  She developed a palpable abnormality in her left breast in January 2017 which she noted on self-examination. A diagnostic mammogram on January 19 showed a greater than 1 cm nodule in the upper outer portion of left breast.  By ultrasound this was lobulated and hypoechoic measuring 1.75 x 1.51 x 1.96 cm. On January 24, 2017 a core needle biopsy was performed which showed infiltrating ductal carcinoma, high grade.  The tumor was ER negative, GA negative, and HER-2 negative.  A follow-up ultrasound on December 6 showed 2.5 x 2.2 x 1.5 cm left breast mass.  There was no abnormality noted in the left axilla. She underwent sentinel lymph node biopsy on February 22.  That showed 4 negative lymph nodes.     She had 4 cycles of  Wilbur-adjuvantTaxotere and Cytoxan completed  on 5/9/17.     On June 26 she underwent left mastectomy.  That revealed 2 foci of invasive high-grade carcinoma measuring 14 mm and 1.5 mm.  Margins were negative.     She completed 4 cycles of adjuvant  Adriamycin on 2017.     Today she reports she has some fatigue but is doing some exercise on her stationary bike, about 15 minutes at a time, not as often as she would like.  She has some achiness in the morning when she first wakes up.  She denies any shortness of breath.  Appetite and bowel function have been good.    BRCA 1/2 negative.            Past Medical History:   Diagnosis Date    Cancer       breast    Hypertension              Past Surgical History:   Procedure Laterality Date    BREAST SURGERY   2017     left breast bx     SECTION        D&C        PORTACATH PLACEMENT        SENTINEL LYMPH NODE BIOPSY   2017     left    UTERINE FIBROID SURGERY                 Current Outpatient Prescriptions on File Prior to Visit   Medication Sig Dispense Refill    amlodipine-benazepril (LOTREL) 10-40 mg per capsule TK 1 C PO QD   3    CALCIUM CARBONATE (CALCIUM 500 ORAL) Take by mouth.        cyanocobalamin (VITAMIN B-12) 500 MCG tablet Take 500 mcg by mouth once daily.        DOCUSATE CALCIUM (STOOL SOFTENER ORAL) Take by mouth.        hydrochlorothiazide (HYDRODIURIL) 25 MG tablet TK 1 T PO ONCE A DAY   3    metoprolol succinate (TOPROL-XL) 50 MG 24 hr tablet TK 1/2 T PO QD   3    potassium chloride SA (K-DUR,KLOR-CON) 20 MEQ tablet TK 1 T PO  QD   3    ferrous gluconate (FERGON) 324 MG tablet TK 1 T PO QD   3      No current facility-administered medications on file prior to visit.       Social History   Social History            Social History    Marital status:        Spouse name: N/A    Number of children: N/A    Years of education: N/A          Occupational History    Not on file.             Social History Main Topics    Smoking status: Never Smoker    Smokeless tobacco: Not on file    Alcohol use Yes         Comment: wine    Drug use: No    Sexual activity: Not on file           Other Topics Concern    Not on file          Social History Narrative  "   No narrative on file                Family History   Problem Relation Age of Onset    Heart disease Father      Breast cancer Sister         at age 52 or 53         Review of Systems  Objective:   /68 (BP Location: Right arm, Patient Position: Sitting, BP Method: Automatic)   Pulse 98   Temp 98.4 °F (36.9 °C) (Oral)   Ht 5' 5" (1.651 m)   Wt 89.8 kg (198 lb)   BMI 32.95 kg/m²      Physical Exam   Constitutional: She is oriented to person, place, and time. She appears well-developed and well-nourished.   Neck: Normal range of motion. Neck supple. There is a 2x2.5 cm node at the base of the neck in the supraclavicular space.  I do not feel additional LAD, no axillary LAD.  Cardiovascular: Normal rate, regular rhythm and normal heart sounds.    Pulmonary/Chest: Effort normal and breath sounds normal.   Well healed incisional scars of the left and right breast around the nipple and inferior to the nipple. Approximately 2cm, freely mobile palpable mass in the lower inner quadrant of the left breast flap at site of known oil cyst.  No additional masses, no skin lesions, no nipple inversion or discharge and no LAD.  Abdominal: Soft. Bowel sounds are normal.   Musculoskeletal: Normal range of motion.   Neurological: She is alert and oriented to person, place, and time.         Imaging:  Mammogram 4/10/18  Findings:  This procedure was performed using tomosynthesis.  Computer-aided detection was utilized in the interpretation of this examination.  The breast is heterogeneously dense, which may obscure small masses.     There is a 19 mm fat-containing, round mass seen in the lower inner quadrant of the left breast. The mass correlates with a palpable mass reported by the patient. This finding is compatible with an oil cyst.      Impression:  There is no mammographic evidence of malignancy.     BI-RADS Category:   Left: 2 - Benign  Overall: 2 - Benign     Recommendation:  Return to annual screening mammogram " schedule is recommended of the right breast.     Assessment:       Ms. Wadsworth is a 55 yo F s/p left total mastectomy skin sparing with DEIP flap reconstruction on 6/26/18 for [id number], Stage IA triple negative IDC presenting for new palpable supraclavicular node    Plan:      - New palpable mass clinically   - I feel this is very close to critical vessels.  Will have radiology perform US today and will obtain PET to see if there is any distant disease and if there are any additional lesions that may be easier to biopsy.  -Plan for biopsy next week and return in 6 months or sooner if needed.  -I am not sure that we would gain anything from excision unless we cannot perform a needle biopsy.  Should be able to at least get an FNA of this lesion if not a CNB.  - Continue follow up with medical oncology - will need additional treatment  - Emphasized importance of exercise for risk reduction and for overall health.  Also encouraged her to get back to her normal routine now that her active treatment phase is complete.

## 2018-10-17 DIAGNOSIS — I10 HYPERTENSION, UNSPECIFIED TYPE: Primary | ICD-10-CM

## 2018-10-19 ENCOUNTER — HOSPITAL ENCOUNTER (OUTPATIENT)
Dept: RADIOLOGY | Facility: HOSPITAL | Age: 58
Discharge: HOME | End: 2018-10-19
Attending: SURGERY
Payer: COMMERCIAL

## 2018-10-19 DIAGNOSIS — Z17.1 MALIGNANT NEOPLASM OF UPPER-OUTER QUADRANT OF LEFT BREAST IN FEMALE, ESTROGEN RECEPTOR NEGATIVE: Primary | ICD-10-CM

## 2018-10-19 DIAGNOSIS — C50.412 MALIGNANT NEOPLASM OF UPPER-OUTER QUADRANT OF LEFT BREAST IN FEMALE, ESTROGEN RECEPTOR NEGATIVE: Primary | ICD-10-CM

## 2018-10-19 DIAGNOSIS — N63.0 BREAST MASS: ICD-10-CM

## 2018-10-19 PROCEDURE — 19000 PUNCTURE ASPIR CYST BREAST: CPT | Mod: TC,PO

## 2018-10-19 PROCEDURE — 88360 TUMOR IMMUNOHISTOCHEM/MANUAL: CPT | Performed by: PATHOLOGY

## 2018-10-19 PROCEDURE — 76942 ECHO GUIDE FOR BIOPSY: CPT | Mod: 26,,, | Performed by: RADIOLOGY

## 2018-10-19 PROCEDURE — 10021 FNA BX W/O IMG GDN 1ST LES: CPT | Mod: ,,, | Performed by: PATHOLOGY

## 2018-10-19 PROCEDURE — 88305 TISSUE EXAM BY PATHOLOGIST: CPT | Mod: 26,,, | Performed by: PATHOLOGY

## 2018-10-19 PROCEDURE — 19000 PUNCTURE ASPIR CYST BREAST: CPT | Mod: ,,, | Performed by: RADIOLOGY

## 2018-10-19 PROCEDURE — 88173 CYTOPATH EVAL FNA REPORT: CPT | Mod: 26,,, | Performed by: PATHOLOGY

## 2018-10-19 PROCEDURE — 76942 ECHO GUIDE FOR BIOPSY: CPT | Mod: TC,PO

## 2018-10-19 PROCEDURE — 88360 TUMOR IMMUNOHISTOCHEM/MANUAL: CPT | Mod: 26,,, | Performed by: PATHOLOGY

## 2018-10-22 ENCOUNTER — TELEPHONE (OUTPATIENT)
Dept: SURGERY | Facility: CLINIC | Age: 58
End: 2018-10-22

## 2018-10-22 NOTE — TELEPHONE ENCOUNTER
Called patient with the results of her lymph node FNA from 10/19. Left voicemail with my callback number requesting return call at earliest convenience

## 2018-10-23 NOTE — PROGRESS NOTES
Subjective:       Patient ID: Ermelinda Verde is a 57 y.o. female.    Chief Complaint: Follow-up    HPI   H/o infiltrating ductal carcinoma of the L breast 1/2017. S/p neoadjuvant chemo and L mastectomy 6/2017. S/p adjuvant chem and finished 9/2017.  Follows w/ Dr. Rose   Saw HOWARD Bailey and dr. Reyna - last seen 10/9/18. Found to have palpable supraclavicular LN. Saw Dr. Rose again 10/14/18.  PET w/ multiple metastatic nodes involving L lower cervical/supraclavicular chain, clavicular region and retropectoral region.   S/p needle bx of LN 10/19/18, which showed adenoCA.  Pt reports just some pulling sensation of the left shoulder are. Otherwise no pain. Still working.   Sometimes feels weak on the L arm occasionally. Comes and goes. Doesn't drop things out of hands, etc.     HTN - amlodipine-benazepril 10-40mg daily. Doing well.     C/o ringing in both ears. Constant. Occasional sinus congestion/rhinorrhea. No pain in the ears or fullness. No hearing loss she knows off. No trauma. No HA.     Review of Systems   Constitutional: Negative for activity change and unexpected weight change.   HENT: Positive for rhinorrhea. Negative for hearing loss and trouble swallowing.    Eyes: Negative for discharge and visual disturbance.   Respiratory: Negative for chest tightness and wheezing.    Cardiovascular: Negative for chest pain and palpitations.   Gastrointestinal: Negative for blood in stool, constipation, diarrhea and vomiting.   Endocrine: Negative for polydipsia and polyuria.   Genitourinary: Negative for difficulty urinating, dysuria, hematuria and menstrual problem.   Musculoskeletal: Negative for arthralgias, joint swelling and neck pain.   Neurological: Positive for weakness. Negative for headaches.   Psychiatric/Behavioral: Negative for confusion and dysphoric mood.         Objective:      Physical Exam    /70 (BP Location: Left arm, Patient Position: Sitting, BP Method: Large (Manual))    "Pulse 100   Temp 98.4 °F (36.9 °C)   Ht 5' 5" (1.651 m)   Wt 88.2 kg (194 lb 6.4 oz)   BMI 32.35 kg/m²     Gen - A+OX4, NAD  Psych - flat affect.   HEENT - PERRL,OP clear. MMM. B TM fullness and dull on the L side.  Neck - 2 L lower post cervical and 1 L supraclavicular LN, hard to touch, non-mobile, nontender ~1-2cm.   CV - RRR  No m/r  Chest - CTAB, no wheezing/rhonchi  Abd - S/NT/ND/+BS  Ext -2 + B radial pulses. No LE edema.   MSK - point tenderness to palpation of the mid thoracic spine in between the shoulder blades. No paraspinal tenderness to palpation.    Previous labs, imaging and bx results reviewed.    Assessment/Plan     Devery was seen today for follow-up.    Diagnoses and all orders for this visit:    Adjustment disorder with anxious mood  -     ALPRAZolam (XANAX) 0.25 MG tablet; Take 1 tablet (0.25 mg total) by mouth 2 (two) times daily as needed for Anxiety.    Malignant neoplasm of upper-outer quadrant of left breast in female, estrogen receptor negative  -     X-Ray Thoracic Spine AP Lateral; Future    Benign essential hypertension - Stable and controlled. Continue current medications.  -     Comprehensive metabolic panel; Future    Tinnitus of both ears  -     fluticasone (FLONASE) 50 mcg/actuation nasal spray; 1 spray (50 mcg total) by Each Nare route once daily.  -     Ambulatory Referral to ENT    Prediabetes  -     Hemoglobin A1c; Future    Mid back pain  -     X-Ray Thoracic Spine AP Lateral; Future      No Follow-up on file.      Reta Weeks MD  Department of Internal Medicine - [facility]  2:45 PM  "

## 2018-10-24 ENCOUNTER — HOSPITAL ENCOUNTER (OUTPATIENT)
Dept: RADIOLOGY | Facility: HOSPITAL | Age: 58
Discharge: HOME | End: 2018-10-24
Attending: INTERNAL MEDICINE
Payer: COMMERCIAL

## 2018-10-24 ENCOUNTER — IMMUNIZATION (OUTPATIENT)
Dept: INTERNAL MEDICINE | Facility: CLINIC | Age: 58
End: 2018-10-24
Payer: COMMERCIAL

## 2018-10-24 ENCOUNTER — OFFICE VISIT (OUTPATIENT)
Dept: INTERNAL MEDICINE | Facility: CLINIC | Age: 58
End: 2018-10-24
Payer: COMMERCIAL

## 2018-10-24 ENCOUNTER — TELEPHONE (OUTPATIENT)
Dept: HEMATOLOGY/ONCOLOGY | Facility: CLINIC | Age: 58
End: 2018-10-24

## 2018-10-24 VITALS
DIASTOLIC BLOOD PRESSURE: 70 MMHG | BODY MASS INDEX: 32.39 KG/M2 | HEIGHT: 65 IN | TEMPERATURE: 98 F | WEIGHT: 194.38 LBS | SYSTOLIC BLOOD PRESSURE: 122 MMHG | HEART RATE: 100 BPM

## 2018-10-24 DIAGNOSIS — Z17.1 MALIGNANT NEOPLASM OF UPPER-OUTER QUADRANT OF LEFT BREAST IN FEMALE, ESTROGEN RECEPTOR NEGATIVE: ICD-10-CM

## 2018-10-24 DIAGNOSIS — C50.412 MALIGNANT NEOPLASM OF UPPER-OUTER QUADRANT OF LEFT BREAST IN FEMALE, ESTROGEN RECEPTOR NEGATIVE: ICD-10-CM

## 2018-10-24 DIAGNOSIS — M54.9 MID BACK PAIN: ICD-10-CM

## 2018-10-24 DIAGNOSIS — H93.13 TINNITUS OF BOTH EARS: ICD-10-CM

## 2018-10-24 DIAGNOSIS — R73.03 PREDIABETES: ICD-10-CM

## 2018-10-24 DIAGNOSIS — I10 BENIGN ESSENTIAL HYPERTENSION: ICD-10-CM

## 2018-10-24 DIAGNOSIS — F43.22 ADJUSTMENT DISORDER WITH ANXIOUS MOOD: Primary | ICD-10-CM

## 2018-10-24 PROCEDURE — 3078F DIAST BP <80 MM HG: CPT | Mod: CPTII,S$GLB,, | Performed by: INTERNAL MEDICINE

## 2018-10-24 PROCEDURE — 99214 OFFICE O/P EST MOD 30 MIN: CPT | Mod: S$GLB,,, | Performed by: INTERNAL MEDICINE

## 2018-10-24 PROCEDURE — 3074F SYST BP LT 130 MM HG: CPT | Mod: CPTII,S$GLB,, | Performed by: INTERNAL MEDICINE

## 2018-10-24 PROCEDURE — 72070 X-RAY EXAM THORAC SPINE 2VWS: CPT | Mod: TC

## 2018-10-24 PROCEDURE — 99999 PR PBB SHADOW E&M-EST. PATIENT-LVL IV: CPT | Mod: PBBFAC,,, | Performed by: INTERNAL MEDICINE

## 2018-10-24 PROCEDURE — 72070 X-RAY EXAM THORAC SPINE 2VWS: CPT | Mod: 26,,, | Performed by: RADIOLOGY

## 2018-10-24 PROCEDURE — 90471 IMMUNIZATION ADMIN: CPT | Mod: S$GLB,,, | Performed by: INTERNAL MEDICINE

## 2018-10-24 PROCEDURE — 3008F BODY MASS INDEX DOCD: CPT | Mod: CPTII,S$GLB,, | Performed by: INTERNAL MEDICINE

## 2018-10-24 PROCEDURE — 90686 IIV4 VACC NO PRSV 0.5 ML IM: CPT | Mod: S$GLB,,, | Performed by: INTERNAL MEDICINE

## 2018-10-24 RX ORDER — ALPRAZOLAM 0.25 MG/1
0.25 TABLET ORAL 2 TIMES DAILY PRN
Qty: 15 TABLET | Refills: 0 | Status: SHIPPED | OUTPATIENT
Start: 2018-10-24 | End: 2018-12-05

## 2018-10-24 RX ORDER — FLUTICASONE PROPIONATE 50 MCG
1 SPRAY, SUSPENSION (ML) NASAL DAILY
Qty: 16 G | Refills: 0 | Status: SHIPPED | OUTPATIENT
Start: 2018-10-24 | End: 2018-11-24 | Stop reason: SDUPTHER

## 2018-10-24 NOTE — TELEPHONE ENCOUNTER
Patient called me back, we discussed her FNA results. She states they told her it would be positive while she was there. She requests I email her a copy once the receptors come back. Will do so as soon as I see them.

## 2018-10-24 NOTE — TELEPHONE ENCOUNTER
Contacted patient to discuss that Dr. Rose the surgical oncologist had reached out to Dr. Alex Reyna ( medical oncologist) to discuss plan of care with patient and would like to have appointment sooner given recent pathology findings. Appointment expedited from Monday to this Friday 10/26 at 1045, pt verbalized understanding and confirmed appointment time and date.

## 2018-10-24 NOTE — TELEPHONE ENCOUNTER
----- Message from Phoebe Lizarraga sent at 10/24/2018 11:59 AM CDT -----  I will schedule her appointment but would prefer a nurse to call in case she is asking what the apt is for.   ----- Message -----  From: Alex Reyna MD  Sent: 10/24/2018  11:57 AM  To: Regina Rose MD, Phoebe Lizarraga    10;45 on Friday  ----- Message -----  From: Regina Rose MD  Sent: 10/23/2018   8:25 PM  To: Alex Reyna MD, #    Biopsy of the supracalvicular node is positive for triple negative cancer.  PET complete without distant disease but is pretty extensive regionally.  Just seeing this late so I haven't had a chance to call her, but wanted to let you guys know that we need to get a plan going for her quickly.    Thanks,  Regina

## 2018-10-25 NOTE — PROGRESS NOTES
Subjective:       Patient ID: Ermelinda Verde is a 57 y.o. female.    Chief Complaint: No chief complaint on file.    Mrs Verde is  a very pleasant 57-year-old -American female who returns for a diagnosis of left breast cancer.  She is a patient of Dr. Rose and also a patient of .    She was seen in clinic on October 9th and was found to have some left supraclavicular lymphadenopathy.  She was referred to .  A fine-needle aspirate of the lymph node was performed on October 19th.  That showed metastatic carcinoma consistent with breast primary which was ER negative, MS negative and H ER 2-.      Onc History:  She developed a palpable abnormality in her left breast in January 2017 which she noted on self-examination.  A diagnostic mammogram on January 19 showed a greater than 1 cm nodule in the upper outer portion of left breast.  By ultrasound this was lobulated and hypoechoic measuring 1.75 x 1.51 x 1.96 cm.     On January 24, 2017 a core needle biopsy was performed which showed infiltrating ductal carcinoma, high grade.  The tumor was ER negative, MS negative, and HER-2 negative.  A follow-up ultrasound on December 6 showed 2.5 x 2.2 x 1.5 cm left breast mass.  There was no abnormality noted in the left axilla.     She underwent sentinel lymph node biopsy on February 22.  That showed 4 negative lymph nodes.     She had 4 cycles of  Wilbur-adjuvantTaxotere and Cytoxan completed  on 5/9/17.     On June 26 she underwent left mastectomy.  That revealed 2 foci of invasive high-grade carcinoma measuring 14 mm and 1.5 mm.  Margins were negative.    She completed 4 cycles of adjuvant Adriamycin on September 12, 2017.          Review of Systems   Constitutional: Positive for fatigue. Negative for activity change, appetite change, fever and unexpected weight change.   HENT: Negative for mouth sores.    Eyes: Negative for visual disturbance.   Respiratory: Negative for cough and shortness of  breath.    Cardiovascular: Negative for chest pain.   Gastrointestinal: Positive for constipation. Negative for abdominal pain, diarrhea, nausea and vomiting.   Genitourinary: Negative for frequency.   Musculoskeletal: Negative for back pain.   Skin: Negative for pallor and rash.   Neurological: Positive for numbness. Negative for headaches.   Hematological: Negative for adenopathy.   Psychiatric/Behavioral: The patient is not nervous/anxious.        Objective:      Physical Exam   Constitutional: She is oriented to person, place, and time. She appears well-developed and well-nourished. No distress.   ECOG 0  Presents with      HENT:   Mouth/Throat: Oropharynx is clear and moist. No oropharyngeal exudate.   Eyes: No scleral icterus.   Cardiovascular: Normal rate and regular rhythm.   Pulmonary/Chest: Effort normal and breath sounds normal. No respiratory distress. Right breast exhibits no mass, no nipple discharge and no skin change.   Right breast without mass, nodule or skin changes. Left breast without mass, nodule or skin changes   Abdominal: Soft. She exhibits no distension and no mass. There is no tenderness.   Musculoskeletal:        Lymphadenopathy:     She has cervical adenopathy.        Right cervical: No superficial cervical, no deep cervical and no posterior cervical adenopathy present.       Left cervical: Superficial cervical adenopathy present.     She has no axillary adenopathy.        Right: No supraclavicular adenopathy present.        Left: Supraclavicular adenopathy present.   Neurological: She is alert and oriented to person, place, and time.   Skin: Skin is warm.   Psychiatric: She has a normal mood and affect. Her behavior is normal. Thought content normal.   Vitals reviewed.      Assessment:       1. Malignant neoplasm of upper-outer quadrant of left breast in female, estrogen receptor negative        Plan:       I discussed the situation at length with the patient and her .   Specifically, I discussed the fact that would be very difficult to provide curative therapy in this situation.  Nevertheless, she does have regional disease that could be amenable to radiation.  I recommended that we start with systemic therapy and then consider regional radiation based on response.  The optimal chemotherapy is unclear although based on the recent new [employer] publication of Abraxane and Atezolizimab, chemo immunotherapy seems worth considering.  I provided them with written information regarding these medications.  She will need a port placed as soon as possible.      Distress Screening Results: Psychosocial Distress screening score of Distress Score: 0 noted and reviewed. No intervention indicated.

## 2018-10-26 ENCOUNTER — PATIENT MESSAGE (OUTPATIENT)
Dept: HEMATOLOGY/ONCOLOGY | Facility: CLINIC | Age: 58
End: 2018-10-26

## 2018-10-26 ENCOUNTER — OFFICE VISIT (OUTPATIENT)
Dept: HEMATOLOGY/ONCOLOGY | Facility: CLINIC | Age: 58
End: 2018-10-26
Payer: COMMERCIAL

## 2018-10-26 VITALS
BODY MASS INDEX: 32.18 KG/M2 | OXYGEN SATURATION: 100 % | TEMPERATURE: 98 F | HEIGHT: 65 IN | HEART RATE: 109 BPM | DIASTOLIC BLOOD PRESSURE: 79 MMHG | WEIGHT: 193.13 LBS | SYSTOLIC BLOOD PRESSURE: 140 MMHG | RESPIRATION RATE: 18 BRPM

## 2018-10-26 DIAGNOSIS — C50.412 MALIGNANT NEOPLASM OF UPPER-OUTER QUADRANT OF LEFT BREAST IN FEMALE, ESTROGEN RECEPTOR NEGATIVE: Primary | ICD-10-CM

## 2018-10-26 DIAGNOSIS — Z17.1 MALIGNANT NEOPLASM OF UPPER-OUTER QUADRANT OF LEFT BREAST IN FEMALE, ESTROGEN RECEPTOR NEGATIVE: Primary | ICD-10-CM

## 2018-10-26 DIAGNOSIS — C77.9 REGIONAL LYMPH NODE METASTASIS PRESENT: ICD-10-CM

## 2018-10-26 DIAGNOSIS — Z51.11 ENCOUNTER FOR ANTINEOPLASTIC CHEMOTHERAPY: ICD-10-CM

## 2018-10-26 PROCEDURE — 3077F SYST BP >= 140 MM HG: CPT | Mod: CPTII,S$GLB,, | Performed by: INTERNAL MEDICINE

## 2018-10-26 PROCEDURE — 3008F BODY MASS INDEX DOCD: CPT | Mod: CPTII,S$GLB,, | Performed by: INTERNAL MEDICINE

## 2018-10-26 PROCEDURE — 99215 OFFICE O/P EST HI 40 MIN: CPT | Mod: S$GLB,,, | Performed by: INTERNAL MEDICINE

## 2018-10-26 PROCEDURE — 99999 PR PBB SHADOW E&M-EST. PATIENT-LVL III: CPT | Mod: PBBFAC,,, | Performed by: INTERNAL MEDICINE

## 2018-10-26 PROCEDURE — 3078F DIAST BP <80 MM HG: CPT | Mod: CPTII,S$GLB,, | Performed by: INTERNAL MEDICINE

## 2018-10-26 NOTE — Clinical Note
Needs a port placed, prior to starting chemotherapy with Abraxane and atezolizumab. CBC,CMP, Ca27.29 at start

## 2018-10-29 ENCOUNTER — TELEPHONE (OUTPATIENT)
Dept: SURGERY | Facility: CLINIC | Age: 58
End: 2018-10-29

## 2018-10-29 DIAGNOSIS — C77.9 REGIONAL LYMPH NODE METASTASIS PRESENT: ICD-10-CM

## 2018-10-29 DIAGNOSIS — C50.412 MALIGNANT NEOPLASM OF UPPER-OUTER QUADRANT OF LEFT BREAST IN FEMALE, ESTROGEN RECEPTOR NEGATIVE: Primary | ICD-10-CM

## 2018-10-29 DIAGNOSIS — Z17.1 MALIGNANT NEOPLASM OF UPPER-OUTER QUADRANT OF LEFT BREAST IN FEMALE, ESTROGEN RECEPTOR NEGATIVE: Primary | ICD-10-CM

## 2018-10-30 ENCOUNTER — HOSPITAL ENCOUNTER (OUTPATIENT)
Facility: HOSPITAL | Age: 58
Discharge: HOME | End: 2018-10-30
Attending: SURGERY | Admitting: SURGERY
Payer: COMMERCIAL

## 2018-10-30 ENCOUNTER — ANESTHESIA EVENT (OUTPATIENT)
Dept: SURGERY | Facility: HOSPITAL | Age: 58
End: 2018-10-30

## 2018-10-30 ENCOUNTER — ANESTHESIA (OUTPATIENT)
Dept: SURGERY | Facility: HOSPITAL | Age: 58
End: 2018-10-30

## 2018-10-30 ENCOUNTER — ANESTHESIA EVENT (OUTPATIENT)
Dept: SURGERY | Facility: HOSPITAL | Age: 58
End: 2018-10-30
Payer: COMMERCIAL

## 2018-10-30 DIAGNOSIS — C50.919 BREAST CANCER IN FEMALE: ICD-10-CM

## 2018-10-30 PROBLEM — C50.412 BREAST CANCER OF UPPER-OUTER QUADRANT OF LEFT FEMALE BREAST: Chronic | Status: ACTIVE | Noted: 2017-02-09

## 2018-10-30 PROCEDURE — 99499 UNLISTED E&M SERVICE: CPT | Mod: ,,, | Performed by: SURGERY

## 2018-10-30 RX ORDER — LIDOCAINE HYDROCHLORIDE 10 MG/ML
1 INJECTION, SOLUTION EPIDURAL; INFILTRATION; INTRACAUDAL; PERINEURAL ONCE
Status: DISCONTINUED | OUTPATIENT
Start: 2018-10-30 | End: 2018-10-31 | Stop reason: HOSPADM

## 2018-10-30 RX ORDER — CEFAZOLIN SODIUM 1 G/3ML
2 INJECTION, POWDER, FOR SOLUTION INTRAMUSCULAR; INTRAVENOUS
Status: DISCONTINUED | OUTPATIENT
Start: 2018-10-30 | End: 2018-10-31 | Stop reason: HOSPADM

## 2018-10-30 RX ORDER — SODIUM CHLORIDE 9 MG/ML
INJECTION, SOLUTION INTRAVENOUS CONTINUOUS
Status: DISCONTINUED | OUTPATIENT
Start: 2018-10-30 | End: 2018-10-31 | Stop reason: HOSPADM

## 2018-10-30 NOTE — ANESTHESIA PREPROCEDURE EVALUATION
[facility]  Anesthesia Pre-Operative Evaluation         Patient Name: Ermelinda Verde  YOB: 1960  MRN: [id number]    SUBJECTIVE:     Pre-operative evaluation for Procedure(s) (LRB):  [id number]-CATH-neck or chest Fluoro needed Consent AM of surgery (Right)     10/30/2018    Ermelinda Verde is a 57 y.o. female w/ a significant PMHx of HTN,  infiltrating ductal carcinoma of L breast (Dx 1/2017, s/p neoadjuvant chemo and L mastectomy), recently found to have multiple metastatic lymph nodes showing metastatic adenocarcinoma.    Patient now presents for the above procedure(s).    Prev airway: None documented      Patient Active Problem List   Diagnosis    Hypertension    Breast cancer of upper-outer quadrant of left female breast    Encounter for antineoplastic chemotherapy    Personal history of malignant neoplasm of breast    Vitamin B12 deficiency    Adjustment disorder with depressed mood    Pre-diabetes    Regional lymph node metastasis present       Review of patient's allergies indicates:  No Known Allergies    Current Inpatient Medications:      No current facility-administered medications on file prior to encounter.      Current Outpatient Medications on File Prior to Encounter   Medication Sig Dispense Refill    ALPRAZolam (XANAX) 0.25 MG tablet Take 1 tablet (0.25 mg total) by mouth 2 (two) times daily as needed for Anxiety. 15 tablet 0    amlodipine-benazepril (LOTREL) 10-40 mg per capsule TK 1 C PO QD 90 capsule 3    CALCIUM CARBONATE (CALCIUM 500 ORAL) Take by mouth.      cyanocobalamin (VITAMIN B-12) 500 MCG tablet Take 500 mcg by mouth once daily.      fluticasone (FLONASE) 50 mcg/actuation nasal spray 1 spray (50 mcg total) by Each Nare route once daily. 16 g 0    vitamin D (VITAMIN D3) 1000 units Tab Take 1,000 Units by mouth once daily.         Past Surgical History:   Procedure Laterality Date    BIOPSY-SENTINEL NODE Left 2/22/2017    Performed  by [facility] at [facility] OR    BREAST BIOPSY Left     BREAST RECONSTRUCTION Left 2017     SECTION      COLONOSCOPY  2011    repeat in 10 yrs.    D&C      [id number]-SUQQ-F-CATH Right 2017    Performed by Alfredo English MD at [facility]    MASTECTOMY Left     MASTECTOMY Left 2017    Performed by Reg[facility] MD at [facility]    MYOMECTOMY      PORTACATH PLACEMENT      RECONSTRUCTION-BREAST/FLAP-SCOTT Left 2017    [facility] at [facility]    SENTINEL LYMPH NODE BIOPSY  2017    left    TOTAL REDUCTION MAMMOPLASTY Right 2017       Social History     Socioeconomic History    Marital status:      Spouse name: Not on file    Number of children: Not on file    Years of education: Not on file    Highest education level: Not on file   Social Needs    Financial resource strain: Not on file    Food insecurity - worry: Not on file    Food insecurity - inability: Not on file    Transportation needs - medical: Not on file    Transportation needs - non-medical: Not on file   Occupational History    Occupation: banking   Tobacco Use    Smoking status: Never Smoker    Smokeless tobacco: Never Used   Substance and Sexual Activity    Alcohol use: Yes     Comment: wine    Drug use: No    Sexual activity: Yes     Partners: Male     Birth control/protection: Post-menopausal   Other Topics Concern    Not on file   Social History Narrative    Not on file       OBJECTIVE:     Vital Signs Range (Last 24H):         Significant Labs:  Lab Results   Component Value Date    WBC 5.97 2018    HGB 12.9 2018    HCT 39.8 2018     2018    ALT 12 10/24/2018    AST 19 10/24/2018     10/24/2018    K 4.5 10/24/2018     10/24/2018    CREATININE 0.8 10/24/2018    BUN 10 10/24/2018    CO2 26 10/24/2018    TSH 0.573 2018    HGBA1C 5.7 (H) 10/24/2018       Diagnostic Studies: No relevant studies.    EKG: No recent studies  available.  Vent. Rate : 103 BPM     Atrial Rate : 103 BPM     P-R Int : 198 ms          QRS Dur : 086 ms      QT Int : 358 ms       P-R-T Axes : 054 012 030 degrees     QTc Int : 468 ms    Sinus tachycardia  Possible Left atrial enlargement  Borderline Abnormal ECG  When compared with ECG of 21-FEB-2017 09:10,  No significant change was found  Confirmed by Trey Salazar MD ([id number]) on 9/7/2017 10:06:57 AM    Referred By: NO PCP           Confirmed By:rTey Salazar MD    2D ECHO:  Results for orders placed or performed during the hospital encounter of 07/24/17   2D echo with color flow doppler   Result Value Ref Range    Calculated EF 65 55 - 65    Diastolic Dysfunction No     Est. PA Systolic Pressure 23.43     Pericardial Effusion TRIVIAL     Tricuspid Valve Regurgitation TRIVIAL TO MILD    CONCLUSIONS     1 - Normal left ventricular systolic function (EF 60-65%).     2 - Concentric remodeling.     3 - No wall motion abnormalities.     4 - Quantitatively measured LV function is 59%.     5 - Normal left ventricular diastolic function.     6 - Normal right ventricular systolic function .     7 - Trivial to mild tricuspid regurgitation.     8 - Trivial pericardial effusion.     9 - The estimated PA systolic pressure is 23 mmHg.       ASSESSMENT/PLAN:         Pre-op Assessment    I have reviewed the Patient Summary Reports.     I have reviewed the Nursing Notes.   I have reviewed the Medications.     Review of Systems  Anesthesia Hx:  No problems with previous Anesthesia  History of prior surgery of interest to airway management or planning: Denies Family Hx of Anesthesia complications.   Denies Personal Hx of Anesthesia complications.   Social:  Non-Smoker    Hematology/Oncology:  Hematology Normal      Current/Recent Cancer. Breast left   EENT/Dental:EENT/Dental Normal   Cardiovascular:   Exercise tolerance: good Hypertension, well controlled    Renal/:  Renal/ Normal     Hepatic/GI:  Hepatic/GI Normal     Musculoskeletal:  Musculoskeletal Normal    Neurological:  Neurology Normal    Endocrine:  Endocrine Normal    Dermatological:  Skin Normal    Psych:  Psychiatric Normal              Anesthesia Plan  Type of Anesthesia, risks & benefits discussed:  Anesthesia Type:  MAC, general  Patient's Preference:   Intra-op Monitoring Plan: standard ASA monitors  Intra-op Monitoring Plan Comments:   Post Op Pain Control Plan: multimodal analgesia, IV/PO Opioids PRN and per primary service following discharge from PACU  Post Op Pain Control Plan Comments:   Induction:   IV  Beta Blocker:  Patient is not currently on a Beta-Blocker (No further documentation required).       Informed Consent:    ASA Score:      Day of Surgery Review of History & Physical:

## 2018-10-31 ENCOUNTER — TELEPHONE (OUTPATIENT)
Dept: HEMATOLOGY/ONCOLOGY | Facility: CLINIC | Age: 58
End: 2018-10-31

## 2018-10-31 ENCOUNTER — ANESTHESIA (OUTPATIENT)
Dept: SURGERY | Facility: HOSPITAL | Age: 58
End: 2018-10-31
Payer: COMMERCIAL

## 2018-10-31 PROCEDURE — 63600175 PHARM REV CODE 636 W HCPCS: Performed by: STUDENT IN AN ORGANIZED HEALTH CARE EDUCATION/TRAINING PROGRAM

## 2018-10-31 PROCEDURE — D9220A PRA ANESTHESIA: Mod: ,,, | Performed by: ANESTHESIOLOGY

## 2018-10-31 PROCEDURE — 25000003 PHARM REV CODE 250: Performed by: STUDENT IN AN ORGANIZED HEALTH CARE EDUCATION/TRAINING PROGRAM

## 2018-10-31 RX ORDER — PHENYLEPHRINE HYDROCHLORIDE 10 MG/ML
INJECTION INTRAVENOUS
Status: DISCONTINUED | OUTPATIENT
Start: 2018-10-31 | End: 2018-10-31

## 2018-10-31 RX ORDER — PROPOFOL 10 MG/ML
VIAL (ML) INTRAVENOUS
Status: DISCONTINUED | OUTPATIENT
Start: 2018-10-31 | End: 2018-10-31

## 2018-10-31 RX ORDER — FENTANYL CITRATE 50 UG/ML
INJECTION, SOLUTION INTRAMUSCULAR; INTRAVENOUS
Status: DISCONTINUED | OUTPATIENT
Start: 2018-10-31 | End: 2018-10-31

## 2018-10-31 RX ORDER — LIDOCAINE HCL/PF 100 MG/5ML
SYRINGE (ML) INTRAVENOUS
Status: DISCONTINUED | OUTPATIENT
Start: 2018-10-31 | End: 2018-10-31

## 2018-10-31 RX ORDER — ONDANSETRON 2 MG/ML
INJECTION INTRAMUSCULAR; INTRAVENOUS
Status: DISCONTINUED | OUTPATIENT
Start: 2018-10-31 | End: 2018-10-31

## 2018-10-31 RX ORDER — MIDAZOLAM HYDROCHLORIDE 1 MG/ML
INJECTION, SOLUTION INTRAMUSCULAR; INTRAVENOUS
Status: DISCONTINUED | OUTPATIENT
Start: 2018-10-31 | End: 2018-10-31

## 2018-10-31 RX ADMIN — PHENYLEPHRINE HYDROCHLORIDE 100 MCG: 10 INJECTION INTRAVENOUS at 02:10

## 2018-10-31 RX ADMIN — PROPOFOL 50 MG: 10 INJECTION, EMULSION INTRAVENOUS at 03:10

## 2018-10-31 RX ADMIN — LIDOCAINE HYDROCHLORIDE 50 MG: 20 INJECTION, SOLUTION INTRAVENOUS at 02:10

## 2018-10-31 RX ADMIN — SODIUM CHLORIDE: 0.9 INJECTION, SOLUTION INTRAVENOUS at 02:10

## 2018-10-31 RX ADMIN — PROPOFOL 150 MG: 10 INJECTION, EMULSION INTRAVENOUS at 02:10

## 2018-10-31 RX ADMIN — ONDANSETRON 4 MG: 2 INJECTION INTRAMUSCULAR; INTRAVENOUS at 02:10

## 2018-10-31 RX ADMIN — MIDAZOLAM HYDROCHLORIDE 2 MG: 1 INJECTION, SOLUTION INTRAMUSCULAR; INTRAVENOUS at 02:10

## 2018-10-31 RX ADMIN — FENTANYL CITRATE 100 MCG: 50 INJECTION, SOLUTION INTRAMUSCULAR; INTRAVENOUS at 02:10

## 2018-10-31 RX ADMIN — CEFAZOLIN 2 G: 330 INJECTION, POWDER, FOR SOLUTION INTRAMUSCULAR; INTRAVENOUS at 02:10

## 2018-10-31 NOTE — TELEPHONE ENCOUNTER
----- Message from Delmis Johnson, RN sent at 10/31/2018 10:15 AM CDT -----  Maybe bring her in Monday 11/5 with labs prior to see dr. Reyna and to obtain consent... Maybe we can reach out to patient to see if in agreement.     ----- Message -----  From: Phoebe Lizarraga  Sent: 10/31/2018   9:59 AM  To: Axel RODRÍGUEZ Staff    Patients chemo was just approved. When should we have her come in ? [facility]    Message   Received: Today   Message Contents   MD Phoebe Salazar     Needs a port placed, prior to starting chemotherapy with Abraxane and atezolizumab. CBC,CMP, Ca27.29 at start

## 2018-10-31 NOTE — ANESTHESIA PREPROCEDURE EVALUATION
10/31/2018  [facility]  Anesthesia Pre-Operative Evaluation         Patient Name: Ermelinda Verde  YOB: 1960  MRN: [id number]    SUBJECTIVE:     Pre-operative evaluation for Procedure(s) (LRB):  [id number]-CATH (Right)     10/31/2018    Ermelinda Verde is a 57 y.o. female w/ a significant PMHx of HTN,  infiltrating ductal carcinoma of L breast (Dx 1/2017, s/p neoadjuvant chemo and L mastectomy), recently found to have multiple metastatic lymph nodes showing metastatic adenocarcinoma.    Patient now presents for the above procedure(s).      LDA: None documented.       Port A Cath Single Lumen right subclavian (Active)   Number of days:             Peripheral IV - Single Lumen 10/31/18 1031 Right Hand (Active)   Site Assessment Clean;Dry;Intact;No swelling;No redness 10/31/2018 10:00 AM   Line Status Blood return noted;Infusing 10/31/2018 10:00 AM   Dressing Status Dry;Clean;Intact 10/31/2018 10:00 AM   Dressing Intervention New dressing 10/31/2018 10:00 AM   Number of days: 0       Prev airway: None documented.    Drips:    sodium chloride 0.9%         Patient Active Problem List   Diagnosis    Hypertension    Breast cancer of upper-outer quadrant of left female breast    Encounter for antineoplastic chemotherapy    Personal history of malignant neoplasm of breast    Vitamin B12 deficiency    Adjustment disorder with depressed mood    Pre-diabetes    Regional lymph node metastasis present    Breast cancer in female       Review of patient's allergies indicates:  No Known Allergies    Current Inpatient Medications:   lidocaine (PF) 10 mg/ml (1%)  1 mL Intradermal Once       No current facility-administered medications on file prior to encounter.      Current Outpatient Medications on File Prior to Encounter   Medication Sig Dispense Refill    ALPRAZolam  (XANAX) 0.25 MG tablet Take 1 tablet (0.25 mg total) by mouth 2 (two) times daily as needed for Anxiety. 15 tablet 0    amlodipine-benazepril (LOTREL) 10-40 mg per capsule TK 1 C PO QD 90 capsule 3    CALCIUM CARBONATE (CALCIUM 500 ORAL) Take by mouth.      cyanocobalamin (VITAMIN B-12) 500 MCG tablet Take 500 mcg by mouth once daily.      fluticasone (FLONASE) 50 mcg/actuation nasal spray 1 spray (50 mcg total) by Each Nare route once daily. 16 g 0    vitamin D (VITAMIN D3) 1000 units Tab Take 1,000 Units by mouth once daily.         Past Surgical History:   Procedure Laterality Date    BIOPSY-SENTINEL NODE Left 2017    [facility]    BREAST BIOPSY Left     BREAST RECONSTRUCTION Left 2017     SECTION      COLONOSCOPY  2011    repeat in 10 yrs.    D&C      LVRLIFCSH-EYKU-K-CATH Right 2017    [facility] at [facility] OR    [id number]-LXSW-L-CATH-neck or chest Fluoro needed Consent AM of surgery Right 10/30/2018    Performed [facility] 2ND [facility]    MASTECTOMY Left     MASTECTOMY Left 2017    [facility]    MYOMECTOMY      PORTACATH PLACEMENT      RECONSTRUCTION-BREAST/FLAP-SCOTT Left 2017    Performed [facility] at [facility]    SENTINEL LYMPH NODE BIOPSY  2017    left    TOTAL REDUCTION MAMMOPLASTY Right 2017       Social History     Socioeconomic History    Marital status:      Spouse name: Not on file    Number of children: Not on file    Years of education: Not on file    Highest education level: Not on file   Social Needs    Financial resource strain: Not on file    Food insecurity - worry: Not on file    Food insecurity - inability: Not on file    Transportation needs - medical: Not on file    Transportation needs - non-medical: Not on file   Occupational History    Occupation: banking   Tobacco Use    Smoking status: Never Smoker     Smokeless tobacco: Never Used   Substance and Sexual Activity    Alcohol use: Yes     Comment: wine    Drug use: No    Sexual activity: Yes     Partners: Male     Birth control/protection: Post-menopausal   Other Topics Concern    Not on file   Social History Narrative    Not on file       OBJECTIVE:     Vital Signs Range (Last 24H):  Temp:  [37.1 °C (98.7 °F)]   Pulse:  [99]   Resp:  [20]   BP: (127)/(67)   SpO2:  [99 %]       Significant Labs:  Lab Results   Component Value Date    WBC 5.97 06/19/2018    HGB 12.9 06/19/2018    HCT 39.8 06/19/2018     06/19/2018    ALT 12 10/24/2018    AST 19 10/24/2018     10/24/2018    K 4.5 10/24/2018     10/24/2018    CREATININE 0.8 10/24/2018    BUN 10 10/24/2018    CO2 26 10/24/2018    TSH 0.573 03/08/2018    HGBA1C 5.7 (H) 10/24/2018       Diagnostic Studies: No relevant studies.    EKG:    Vent. Rate : 103 BPM     Atrial Rate : 103 BPM     P-R Int : 198 ms          QRS Dur : 086 ms      QT Int : 358 ms       P-R-T Axes : 054 012 030 degrees     QTc Int : 468 ms    Sinus tachycardia  Possible Left atrial enlargement  Borderline Abnormal ECG  When compared with ECG of 21-FEB-2017 09:10,  No significant change was found  Confirmed by Trey Salazar MD ([id number]) on 9/7/2017 10:06:57 AM      2D ECHO:  Results for orders placed or performed during the hospital encounter of 07/24/17   2D echo with color flow doppler   Result Value Ref Range    Calculated EF 65 55 - 65    Diastolic Dysfunction No     Est. PA Systolic Pressure 23.43     Pericardial Effusion TRIVIAL     Tricuspid Valve Regurgitation TRIVIAL TO MILD          ASSESSMENT/PLAN:         Anesthesia Evaluation    I have reviewed the Patient Summary Reports.    I have reviewed the Nursing Notes.   I have reviewed the Medications.     Review of Systems  Anesthesia Hx:  No problems with previous Anesthesia  History of prior surgery of interest to airway management or planning: Denies Family Hx of  Anesthesia complications.   Denies Personal Hx of Anesthesia complications.   Social:  Non-Smoker    Hematology/Oncology:  Hematology Normal      Current/Recent Cancer. Breast left   EENT/Dental:EENT/Dental Normal   Cardiovascular:   Exercise tolerance: good Hypertension, well controlled    Renal/:  Renal/ Normal     Hepatic/GI:  Hepatic/GI Normal    Musculoskeletal:  Musculoskeletal Normal    Neurological:  Neurology Normal    Endocrine:  Endocrine Normal    Dermatological:  Skin Normal    Psych:   depression          Physical Exam  General:  Well nourished    Airway/Jaw/Neck:  Airway Findings: Mouth Opening: Normal Tongue: Normal  General Airway Assessment: Good  Mallampati: I  TM Distance: Normal, at least 6 cm  Jaw/Neck Findings:  Neck ROM: Normal ROM      Dental:  Dental Findings: lower partial dentures, upper partial dentures   Chest/Lungs:  Chest/Lungs Findings: Normal Respiratory Rate     Heart/Vascular:  Heart Findings:       Mental Status:  Mental Status Findings:  Cooperative, Alert and Oriented         Anesthesia Plan  Type of Anesthesia, risks & benefits discussed:  Anesthesia Type:  general, MAC  Patient's Preference:   Intra-op Monitoring Plan: standard ASA monitors  Intra-op Monitoring Plan Comments:   Post Op Pain Control Plan: per primary service following discharge from PACU, IV/PO Opioids PRN and multimodal analgesia  Post Op Pain Control Plan Comments:   Induction:   IV  Beta Blocker:  Patient is not currently on a Beta-Blocker (No further documentation required).       Informed Consent: Patient understands risks and agrees with Anesthesia plan.  Questions answered. Anesthesia consent signed with patient.  ASA Score: 3     Day of Surgery Review of History & Physical:    H&P update referred to the surgeon.         Ready For Surgery From Anesthesia Perspective.

## 2018-10-31 NOTE — ANESTHESIA POSTPROCEDURE EVALUATION
"Anesthesia Post Evaluation    Patient: Ermelinda Vered    Procedure(s) Performed: Procedure(s) (LRB):  [id number]-T-CATH (Right)    Final Anesthesia Type: general  Patient location during evaluation: PACU  Patient participation: Yes- Able to Participate  Level of consciousness: awake and alert and oriented  Post-procedure vital signs: reviewed and stable  Pain management: adequate  Airway patency: patent  PONV status at discharge: No PONV  Anesthetic complications: no      Cardiovascular status: hemodynamically stable  Respiratory status: unassisted  Hydration status: euvolemic  Follow-up not needed.        Visit Vitals  /72   Pulse 71   Temp 36.6 °C (97.8 °F) (Oral)   Resp 18   Ht 5' 4" (1.626 m)   Wt 88 kg (194 lb)   SpO2 98%   Breastfeeding? No   BMI 33.30 kg/m²       Pain/Joni Score: Pain Assessment Performed: Yes (10/31/2018  3:36 PM)  Presence of Pain: complains of pain/discomfort (10/31/2018  3:36 PM)  Pain Rating Prior to Med Admin: 6 (10/31/2018  3:36 PM)  Joni Score: 10 (10/31/2018  3:36 PM)        "

## 2018-10-31 NOTE — TRANSFER OF CARE
"Anesthesia Transfer of Care Note    Patient: Ermelinda Verde    Procedure(s) Performed: Procedure(s) (LRB):  [id number]-CATH (Right)    Patient location: PACU    Anesthesia Type: general    Transport from OR: Transported from OR on 6-10 L/min O2 by face mask with adequate spontaneous ventilation    Post pain: adequate analgesia    Post assessment: no apparent anesthetic complications    Post vital signs: stable    Level of consciousness: awake    Nausea/Vomiting: no nausea/vomiting    Complications: none    Transfer of care protocol was followed      Last vitals:   Visit Vitals  BP (!) 153/85 (BP Location: Right arm)   Pulse 90   Temp 36.6 °C (97.8 °F) (Oral)   Resp 20   Ht 5' 4" (1.626 m)   Wt 88 kg (194 lb)   SpO2 100%   Breastfeeding? No   BMI 33.30 kg/m²     "

## 2018-11-01 ENCOUNTER — TELEPHONE (OUTPATIENT)
Dept: HEMATOLOGY/ONCOLOGY | Facility: CLINIC | Age: 58
End: 2018-11-01

## 2018-11-01 NOTE — TELEPHONE ENCOUNTER
Called and spoke with patient and scheduled her for Tuesday labs and Dr. Reyna and Wednesday chemo.  She asked that the nurse call her back in regards to paper work she brought us that needed to be filled out and faxed to her HR dept.  I told her I would send a message to the nurse and have her call back. She voiced appreciation

## 2018-11-01 NOTE — TELEPHONE ENCOUNTER
Contacted patient to discuss her disability paperwork. Explained to patient that we have submitted her documents to the disability desk who completes a portion and then returns it back to us for review and finalization. Once received will let patient know. Patient stating that before we send it directly to the disability claims desk that it needs to be faxed to [employer] department. Number received.     Pt expressed gratitude.

## 2018-11-01 NOTE — TELEPHONE ENCOUNTER
----- Message from Delmis Johnson RN sent at 11/1/2018 10:19 AM CDT -----  Contact: Pt  Offer her Tuesday and just let me know how that goes?    ----- Message -----  From: Phoebe Lizarraga  Sent: 11/1/2018  10:00 AM  To: Delmis Johnson RN    She is calling me back from the other day to schedule her chemo.   We discussed possibly getting her in on Monday the problem is they just closed the chemo schedule for Monday morning and  only has a 9am which mean she would have to wait 4 hours before getting her chemo. Can I just offer her Tuesday ?  ----- Message -----  From: Monika Rees  Sent: 11/1/2018   9:48 AM  To: Phoebe Sandhu,    Pt is returning your call    Contact number [phone]  Thanks

## 2018-11-01 NOTE — TELEPHONE ENCOUNTER
----- Message from Phoebe Lizarraga sent at 11/1/2018 10:57 AM CDT -----  Contact: Pt  Called and spoke with patient and scheduled her for Tuesday labs and Dr. Reyna and Wednesday chemo.  She asked that you call her back in regards to paper work she brought us that needed to be filled out and faxed to her HR dept.  ----- Message -----  From: Delmis Johnson RN  Sent: 11/1/2018  10:19 AM  To: Phoebe Lizarraga    Offer her Tuesday and just let me know how that goes?    ----- Message -----  From: Phoebe Lizarraga  Sent: 11/1/2018  10:00 AM  To: Delmis Johnson RN    She is calling me back from the other day to schedule her chemo.   We discussed possibly getting her in on Monday the problem is they just closed the chemo schedule for Monday morning and  only has a 9am which mean she would have to wait 4 hours before getting her chemo. Can I just offer her Tuesday ?  ----- Message -----  From: Monika Rees  Sent: 11/1/2018   9:48 AM  To: Phoebe Sandhu Pt is returning your call    Contact number [phone]  Thanks

## 2018-11-05 NOTE — PROGRESS NOTES
Subjective:       Patient ID: Ermelinda Verde is a 57 y.o. female.    Chief Complaint: No chief complaint on file.    Mrs Verde is  a very pleasant 57-year-old -American female who returns for a diagnosis of left breast cancer.  She is a patient of Dr. Rose and also a patient of .          Onc History:  She developed a palpable abnormality in her left breast in January 2017 which she noted on self-examination.  A diagnostic mammogram on January 19 showed a greater than 1 cm nodule in the upper outer portion of left breast.  By ultrasound this was lobulated and hypoechoic measuring 1.75 x 1.51 x 1.96 cm.     On January 24, 2017 a core needle biopsy was performed which showed infiltrating ductal carcinoma, high grade.  The tumor was ER negative, UT negative, and HER-2 negative.  A follow-up ultrasound on December 6 showed 2.5 x 2.2 x 1.5 cm left breast mass.  There was no abnormality noted in the left axilla.     She underwent sentinel lymph node biopsy on February 22.  That showed 4 negative lymph nodes.     She had 4 cycles of  Wilbur-adjuvantTaxotere and Cytoxan completed  on 5/9/17.     On June 26 she underwent left mastectomy.  That revealed 2 foci of invasive high-grade carcinoma measuring 14 mm and 1.5 mm.  Margins were negative.    She completed 4 cycles of adjuvant Adriamycin on September 12, 2017.      In October, she developed some left supraclavicular lymphadenopathy which turned out to be recurrence.     A fine-needle aspirate of the lymph node was performed on October 19th.  That showed metastatic carcinoma consistent with breast primary which was ER negative, UT negative and HER 2-negative.          Review of Systems   Psychiatric/Behavioral: The patient is nervous/anxious.        Objective:      Physical Exam   Constitutional: She is oriented to person, place, and time. She appears well-developed and well-nourished.        Pulmonary/Chest: Right breast exhibits no mass, no nipple  discharge and no skin change.   Musculoskeletal:        Neurological: She is alert and oriented to person, place, and time.   Psychiatric: She has a normal mood and affect. Her behavior is normal. Thought content normal.   Vitals reviewed.      Assessment:       1. Malignant neoplasm of upper-outer quadrant of left breast in female, estrogen receptor negative    2. Regional lymph node metastasis present    3. Encounter for antineoplastic chemotherapy        Plan:     I reviewed side effects of chemotherapy and immunotherapy and written informed consent was executed.    Return to clinic in 3 weeks.    Distress Screening Results: Psychosocial Distress screening score of Distress Score: 0 noted and reviewed. No intervention indicated.

## 2018-11-06 ENCOUNTER — LAB VISIT (OUTPATIENT)
Dept: LAB | Facility: HOSPITAL | Age: 58
End: 2018-11-06
Attending: INTERNAL MEDICINE
Payer: COMMERCIAL

## 2018-11-06 ENCOUNTER — OFFICE VISIT (OUTPATIENT)
Dept: HEMATOLOGY/ONCOLOGY | Facility: CLINIC | Age: 58
End: 2018-11-06
Payer: COMMERCIAL

## 2018-11-06 VITALS
BODY MASS INDEX: 32.63 KG/M2 | HEART RATE: 92 BPM | RESPIRATION RATE: 20 BRPM | DIASTOLIC BLOOD PRESSURE: 80 MMHG | WEIGHT: 191.13 LBS | TEMPERATURE: 97 F | SYSTOLIC BLOOD PRESSURE: 145 MMHG | HEIGHT: 64 IN | OXYGEN SATURATION: 100 %

## 2018-11-06 DIAGNOSIS — C77.9 REGIONAL LYMPH NODE METASTASIS PRESENT: ICD-10-CM

## 2018-11-06 DIAGNOSIS — C50.412 MALIGNANT NEOPLASM OF UPPER-OUTER QUADRANT OF LEFT BREAST IN FEMALE, ESTROGEN RECEPTOR NEGATIVE: ICD-10-CM

## 2018-11-06 DIAGNOSIS — C50.412 MALIGNANT NEOPLASM OF UPPER-OUTER QUADRANT OF LEFT BREAST IN FEMALE, ESTROGEN RECEPTOR NEGATIVE: Primary | Chronic | ICD-10-CM

## 2018-11-06 DIAGNOSIS — Z51.11 ENCOUNTER FOR ANTINEOPLASTIC CHEMOTHERAPY: ICD-10-CM

## 2018-11-06 DIAGNOSIS — Z17.1 MALIGNANT NEOPLASM OF UPPER-OUTER QUADRANT OF LEFT BREAST IN FEMALE, ESTROGEN RECEPTOR NEGATIVE: ICD-10-CM

## 2018-11-06 DIAGNOSIS — Z17.1 MALIGNANT NEOPLASM OF UPPER-OUTER QUADRANT OF LEFT BREAST IN FEMALE, ESTROGEN RECEPTOR NEGATIVE: Primary | Chronic | ICD-10-CM

## 2018-11-06 LAB
ALBUMIN SERPL BCP-MCNC: 4.1 G/DL
ALP SERPL-CCNC: 114 U/L
ALT SERPL W/O P-5'-P-CCNC: 13 U/L
ANION GAP SERPL CALC-SCNC: 9 MMOL/L
AST SERPL-CCNC: 21 U/L
BASOPHILS # BLD AUTO: 0.04 K/UL
BASOPHILS NFR BLD: 0.7 %
BILIRUB SERPL-MCNC: 0.3 MG/DL
BUN SERPL-MCNC: 6 MG/DL
CALCIUM SERPL-MCNC: 10.2 MG/DL
CHLORIDE SERPL-SCNC: 105 MMOL/L
CO2 SERPL-SCNC: 24 MMOL/L
CREAT SERPL-MCNC: 0.8 MG/DL
DIFFERENTIAL METHOD: ABNORMAL
EOSINOPHIL # BLD AUTO: 0.1 K/UL
EOSINOPHIL NFR BLD: 2.3 %
ERYTHROCYTE [DISTWIDTH] IN BLOOD BY AUTOMATED COUNT: 15 %
EST. GFR  (AFRICAN AMERICAN): >60 ML/MIN/1.73 M^2
EST. GFR  (NON AFRICAN AMERICAN): >60 ML/MIN/1.73 M^2
GLUCOSE SERPL-MCNC: 81 MG/DL
HCT VFR BLD AUTO: 41.5 %
HGB BLD-MCNC: 12.6 G/DL
IMM GRANULOCYTES # BLD AUTO: 0.02 K/UL
IMM GRANULOCYTES NFR BLD AUTO: 0.4 %
LYMPHOCYTES # BLD AUTO: 1.7 K/UL
LYMPHOCYTES NFR BLD: 29.5 %
MCH RBC QN AUTO: 26.5 PG
MCHC RBC AUTO-ENTMCNC: 30.4 G/DL
MCV RBC AUTO: 87 FL
MONOCYTES # BLD AUTO: 0.6 K/UL
MONOCYTES NFR BLD: 10.1 %
NEUTROPHILS # BLD AUTO: 3.2 K/UL
NEUTROPHILS NFR BLD: 57 %
NRBC BLD-RTO: 0 /100 WBC
PLATELET # BLD AUTO: 216 K/UL
PMV BLD AUTO: 9.4 FL
POTASSIUM SERPL-SCNC: 4.3 MMOL/L
PROT SERPL-MCNC: 8.6 G/DL
RBC # BLD AUTO: 4.76 M/UL
SODIUM SERPL-SCNC: 138 MMOL/L
WBC # BLD AUTO: 5.63 K/UL

## 2018-11-06 PROCEDURE — 36415 COLL VENOUS BLD VENIPUNCTURE: CPT

## 2018-11-06 PROCEDURE — 3079F DIAST BP 80-89 MM HG: CPT | Mod: CPTII,S$GLB,, | Performed by: INTERNAL MEDICINE

## 2018-11-06 PROCEDURE — 3077F SYST BP >= 140 MM HG: CPT | Mod: CPTII,S$GLB,, | Performed by: INTERNAL MEDICINE

## 2018-11-06 PROCEDURE — 99999 PR PBB SHADOW E&M-EST. PATIENT-LVL III: CPT | Mod: PBBFAC,,, | Performed by: INTERNAL MEDICINE

## 2018-11-06 PROCEDURE — 86300 IMMUNOASSAY TUMOR CA 15-3: CPT

## 2018-11-06 PROCEDURE — 80053 COMPREHEN METABOLIC PANEL: CPT

## 2018-11-06 PROCEDURE — 85025 COMPLETE CBC W/AUTO DIFF WBC: CPT

## 2018-11-06 PROCEDURE — 3008F BODY MASS INDEX DOCD: CPT | Mod: CPTII,S$GLB,, | Performed by: INTERNAL MEDICINE

## 2018-11-06 PROCEDURE — 99214 OFFICE O/P EST MOD 30 MIN: CPT | Mod: S$GLB,,, | Performed by: INTERNAL MEDICINE

## 2018-11-07 ENCOUNTER — INFUSION (OUTPATIENT)
Dept: INFUSION THERAPY | Facility: HOSPITAL | Age: 58
End: 2018-11-07
Attending: INTERNAL MEDICINE
Payer: COMMERCIAL

## 2018-11-07 ENCOUNTER — TELEPHONE (OUTPATIENT)
Dept: SURGERY | Facility: HOSPITAL | Age: 58
End: 2018-11-07

## 2018-11-07 VITALS
RESPIRATION RATE: 18 BRPM | HEIGHT: 64 IN | SYSTOLIC BLOOD PRESSURE: 115 MMHG | TEMPERATURE: 98 F | HEART RATE: 93 BPM | WEIGHT: 191 LBS | DIASTOLIC BLOOD PRESSURE: 69 MMHG | BODY MASS INDEX: 32.61 KG/M2

## 2018-11-07 DIAGNOSIS — Z17.1 MALIGNANT NEOPLASM OF UPPER-OUTER QUADRANT OF LEFT BREAST IN FEMALE, ESTROGEN RECEPTOR NEGATIVE: ICD-10-CM

## 2018-11-07 DIAGNOSIS — C50.412 MALIGNANT NEOPLASM OF UPPER-OUTER QUADRANT OF LEFT BREAST IN FEMALE, ESTROGEN RECEPTOR NEGATIVE: ICD-10-CM

## 2018-11-07 DIAGNOSIS — Z51.11 ENCOUNTER FOR ANTINEOPLASTIC CHEMOTHERAPY: Primary | ICD-10-CM

## 2018-11-07 PROCEDURE — 96375 TX/PRO/DX INJ NEW DRUG ADDON: CPT

## 2018-11-07 PROCEDURE — 96413 CHEMO IV INFUSION 1 HR: CPT

## 2018-11-07 PROCEDURE — 63600175 PHARM REV CODE 636 W HCPCS: Mod: JG | Performed by: INTERNAL MEDICINE

## 2018-11-07 PROCEDURE — A4216 STERILE WATER/SALINE, 10 ML: HCPCS | Performed by: INTERNAL MEDICINE

## 2018-11-07 PROCEDURE — 25000003 PHARM REV CODE 250: Performed by: INTERNAL MEDICINE

## 2018-11-07 PROCEDURE — 96417 CHEMO IV INFUS EACH ADDL SEQ: CPT

## 2018-11-07 RX ORDER — HEPARIN 100 UNIT/ML
500 SYRINGE INTRAVENOUS
Status: CANCELLED | OUTPATIENT
Start: 2018-11-08

## 2018-11-07 RX ORDER — HEPARIN 100 UNIT/ML
500 SYRINGE INTRAVENOUS
Status: CANCELLED | OUTPATIENT
Start: 2018-11-15

## 2018-11-07 RX ORDER — SODIUM CHLORIDE 0.9 % (FLUSH) 0.9 %
10 SYRINGE (ML) INJECTION
Status: CANCELLED | OUTPATIENT
Start: 2018-11-08

## 2018-11-07 RX ORDER — SODIUM CHLORIDE 0.9 % (FLUSH) 0.9 %
10 SYRINGE (ML) INJECTION
Status: CANCELLED | OUTPATIENT
Start: 2018-11-15

## 2018-11-07 RX ORDER — HEPARIN 100 UNIT/ML
500 SYRINGE INTRAVENOUS
Status: CANCELLED | OUTPATIENT
Start: 2018-11-07

## 2018-11-07 RX ORDER — SODIUM CHLORIDE 0.9 % (FLUSH) 0.9 %
10 SYRINGE (ML) INJECTION
Status: DISCONTINUED | OUTPATIENT
Start: 2018-11-07 | End: 2018-11-07 | Stop reason: HOSPADM

## 2018-11-07 RX ORDER — HEPARIN 100 UNIT/ML
500 SYRINGE INTRAVENOUS
Status: DISCONTINUED | OUTPATIENT
Start: 2018-11-07 | End: 2018-11-07 | Stop reason: HOSPADM

## 2018-11-07 RX ORDER — SODIUM CHLORIDE 0.9 % (FLUSH) 0.9 %
10 SYRINGE (ML) INJECTION
Status: CANCELLED | OUTPATIENT
Start: 2018-11-07

## 2018-11-07 RX ADMIN — ALTEPLASE 2 MG: 2.2 INJECTION, POWDER, LYOPHILIZED, FOR SOLUTION INTRAVENOUS at 10:11

## 2018-11-07 RX ADMIN — SODIUM CHLORIDE: 0.9 INJECTION, SOLUTION INTRAVENOUS at 12:11

## 2018-11-07 RX ADMIN — Medication 10 ML: at 01:11

## 2018-11-07 RX ADMIN — HEPARIN 500 UNITS: 100 SYRINGE at 01:11

## 2018-11-07 RX ADMIN — PACLITAXEL 250 MG: 100 INJECTION, POWDER, LYOPHILIZED, FOR SUSPENSION INTRAVENOUS at 01:11

## 2018-11-07 RX ADMIN — ATEZOLIZUMAB 840 MG: 1200 INJECTION, SOLUTION INTRAVENOUS at 12:11

## 2018-11-07 NOTE — PLAN OF CARE
Problem: Patient Care Overview  Goal: Plan of Care Review  Outcome: Ongoing (interventions implemented as appropriate)  Pt tolerated tecentriq and abraxane without issue, pt to rtc 11 /14/18, no distress noted upon d/c to home

## 2018-11-07 NOTE — TELEPHONE ENCOUNTER
Received phone call from patient. Patient noting that the day after surgery she began to note some small bumps to her knuckles and on the back of her hands. These bumps were very itchy. Tried cortisone although this has not helped much. This rash has increased over time to involve the top of her hands. Bumps have become larger in size. Was not prescribed/has not taken any pain medication or antibiotics following surgery. Just started chemotherapy today and tolerated well. Patient states that she has not started using any new laundry detergents, lotions, soaps, etc. Has not tried taking benadryl for itching.     I suggested patient attempt taking OTC benadryl to help itching and continue utilizing cortisone cream. However, if this continues to be a problem despite interventions she may want to be evaluated by a walk in physician or her PCP for further care. We discussed that we did not place surgical prep on her hands, and so prep would not be a source of reaction. We also discussed that if patient should develop any signs of irritation or infection around her port site she should call the clinic or [facility] for further instruction. Patient noted her port site looks great and she has not had any issues. She acknowledged instructions.    Rachel Ren MD  General Surgery, PGYI [facility]

## 2018-11-07 NOTE — NURSING
1048 right chest wall port accessed without issue, flushes easily unable to aspirate blood return with coughing , multiple position changes and flushing, cath devin 2 ml instilled to port will monitor

## 2018-11-07 NOTE — PLAN OF CARE
Problem: Chemotherapy Effects (Adult)  Goal: Signs and Symptoms of Listed Potential Problems Will be Absent, Minimized or Managed (Chemotherapy Effects)  Signs and symptoms of listed potential problems will be absent, minimized or managed by discharge/transition of care (reference Chemotherapy Effects (Adult) CPG).  Outcome: Ongoing (interventions implemented as appropriate)  Pt here for tecentriq/abraxane infusion D1C1, labs, hx, meds, allergies reviewed, pt with no complaints at this time, reclined in  chair, continue to monitor

## 2018-11-08 LAB — CANCER AG27-29 SERPL-ACNC: 23 U/ML

## 2018-11-11 ENCOUNTER — OFFICE VISIT (OUTPATIENT)
Dept: URGENT CARE | Facility: CLINIC | Age: 58
End: 2018-11-11
Payer: COMMERCIAL

## 2018-11-11 VITALS
RESPIRATION RATE: 20 BRPM | WEIGHT: 189 LBS | OXYGEN SATURATION: 96 % | SYSTOLIC BLOOD PRESSURE: 112 MMHG | DIASTOLIC BLOOD PRESSURE: 66 MMHG | BODY MASS INDEX: 31.49 KG/M2 | TEMPERATURE: 99 F | HEIGHT: 65 IN | HEART RATE: 85 BPM

## 2018-11-11 DIAGNOSIS — L30.9 DERMATITIS: Primary | ICD-10-CM

## 2018-11-11 PROCEDURE — 96372 THER/PROPH/DIAG INJ SC/IM: CPT | Mod: 59,S$GLB,, | Performed by: PHYSICIAN ASSISTANT

## 2018-11-11 PROCEDURE — 3078F DIAST BP <80 MM HG: CPT | Mod: CPTII,S$GLB,, | Performed by: PHYSICIAN ASSISTANT

## 2018-11-11 PROCEDURE — 3074F SYST BP LT 130 MM HG: CPT | Mod: CPTII,S$GLB,, | Performed by: PHYSICIAN ASSISTANT

## 2018-11-11 PROCEDURE — 99214 OFFICE O/P EST MOD 30 MIN: CPT | Mod: 25,S$GLB,, | Performed by: PHYSICIAN ASSISTANT

## 2018-11-11 PROCEDURE — 3008F BODY MASS INDEX DOCD: CPT | Mod: CPTII,S$GLB,, | Performed by: PHYSICIAN ASSISTANT

## 2018-11-11 RX ORDER — CETIRIZINE HYDROCHLORIDE 10 MG/1
10 TABLET ORAL DAILY
Qty: 10 TABLET | Refills: 0 | Status: SHIPPED | OUTPATIENT
Start: 2018-11-11 | End: 2018-11-27

## 2018-11-11 RX ORDER — DEXAMETHASONE SODIUM PHOSPHATE 100 MG/10ML
10 INJECTION INTRAMUSCULAR; INTRAVENOUS
Status: COMPLETED | OUTPATIENT
Start: 2018-11-11 | End: 2018-11-11

## 2018-11-11 RX ORDER — TRIAMCINOLONE ACETONIDE 1 MG/G
CREAM TOPICAL 2 TIMES DAILY
Qty: 45 G | Refills: 0 | Status: SHIPPED | OUTPATIENT
Start: 2018-11-11 | End: 2018-12-05 | Stop reason: SDUPTHER

## 2018-11-11 RX ORDER — PREDNISONE 20 MG/1
20 TABLET ORAL 2 TIMES DAILY
Qty: 8 TABLET | Refills: 0 | Status: SHIPPED | OUTPATIENT
Start: 2018-11-11 | End: 2018-11-11 | Stop reason: CLARIF

## 2018-11-11 RX ADMIN — DEXAMETHASONE SODIUM PHOSPHATE 10 MG: 100 INJECTION INTRAMUSCULAR; INTRAVENOUS at 11:11

## 2018-11-11 NOTE — PATIENT INSTRUCTIONS
1.  Take all medications as directed. If you have been prescribed antibiotics, make sure to complete them.   2.  Rest and keep yourself/patient well hydrated. For adults, it is recommended to drink at least 8-10 glasses of water daily.   3.  For patients above 6 months of age who are not allergic to and are not on anticoagulants, you can alternate Tylenol and Motrin every 4-6 hours for fever above 100.4F and/or pain.  For patients less than 6 months of age, allergic to or intolerant to NSAIDS, have gastritis, gastric ulcers, or history of GI bleeds, are pregnant, or are on anticoagulant therapy, you can take Tylenol every 4 hours as needed for fever above 100.4F and/or pain.   4. You should schedule a follow-up appointment with your Primary Care Provider/Pediatrician for recheck in 2-3 days or as directed at this visit.   5.  If your condition fails to improve in a timely manner, you should receive another evaluation by your Primary Care Provider/Pediatrician to discuss your concerns or return to urgent care for a recheck.  If your condition worsens at any time, you should report immediately to your nearest Emergency Department for further evaluation. **You must understand that you have received Urgent Care treatment only and that you may be released before all of your medical problems are known or treated. You, the patient, are responsible to arrange for follow-up care as instructed.         General Allergic Reactions  An allergic reaction is a set of symptoms caused by an allergen. An allergen is something that causes a persons immune system to react. When a person comes in contact with an allergen, it causes the body to release chemicals. These include the chemical histamine. Histamine causes swelling and itching. It may affect the entire body. This is called a general allergic reaction. Often symptoms affect only 1 part of the body. This is called a local allergic reaction.  You are having an allergic reaction.  Almost anything can cause one. Different people are allergic to different things. It is usually something that you ate or swallowed, came into contact with by getting or putting it on your skin or clothes, or something you breathed in the air. This can be very annoying and sometimes scary.  Most of us think of allergic reactions when we have a rash or itchy skin. Symptoms can include:  · Itching of the eyes, nose, and roof of the mouth  · Runny or stuffy nose  · Watery eyes   · Sneezing or coughing   · A blocked feeling in the ear  · Red, itchy rash called hives  · Red and purple spots  · Rash, redness, welts, blisters  · Itching, burning, stinging, pain  · Dry, flaky, cracking, scaly skin  Severe symptoms include:  · Swelling of the face, lips, or other parts of the body  · Hoarse voice  · Trouble swallowing, feeling like your throat is closing  · Trouble breathing, wheezing  · Nausea, vomiting, diarrhea, stomach cramps  · Feeling faint or lightheaded, rapid heart rate  Sometimes the cause may be obvious. But there are so many things that can cause a reaction that you may not be able to figure out. The most important things to help find your allergen are:  · Remembering when it started  · What you were doing at the time or just before that  · Any activities you were involved in  · Any new products or contacts  Below are some common causes. But remember that almost anything can cause a reaction. You may not even be aware that you came into contact with one of these things:  · Dust, mold, pollen  · Plants (common ones are poison ivy and poison oak, but there are many others)   · Animals  · Foods such as shrimp, shellfish, peanuts, milk products, gluten, and eggs. Also food colorings, flavorings, and additives.  · Insect bites or stings such as bees, mosquitos, fleas, ticks  · Medicines such as penicillin, sulfa medicines, amoxicillin, aspirin, and ibuprofen. But any medicine can cause a reaction.  · Jewelry such as  nickel or gold. This can be new, or something youve worn for a while, including zippers and buttons.  · Latex such as in gloves, clothes, toys, balloons, or some tapes. Some people allergic to latex may also have problems with foods like bananas, avocados, kiwi, papaya, or chestnuts.  · Lotions, perfumes, cosmetics, soaps, shampoos, skincare products, nail products  · Chemicals or dyes in clothing, linen, , hair dyes, soaps, iodine  Many viruses and common colds can cause a rash that is not an allergic reaction. Sometimes it is hard to tell the difference between allergies, sensitivity, or an intolerance to something. This is especially true with food. Many things can cause diarrhea, vomiting, stomach cramps, and skin irritation.  Home care    The goal of treatment is to help relieve the symptoms and get you feeling better. The rash will usually fade over several days. But it can sometimes last a couple of weeks. Over the next couple of days, there may be times when it is gets a little worse, and then better again. Here are some things to do:  · If you know what you are allergic to, stay away from it. Future reactions could be worse than this one.  · Avoid tight clothing and anything that heats up your skin (hot showers or baths, direct sunlight). Heat will make itching worse.  · An ice pack will relieve local areas of intense itching and redness. To make an ice pack, put ice cubes in a plastic bag that seals at the top. Wrap it in a thin, clean towel. Dont put the ice directly on the skin because it can damage the skin.  · Oral diphenhydramine is an over-the-counter antihistamine sold at pharmacy and grocery stores. Unless a prescription antihistamine was given, diphenhydramine may be used to reduce itching if large areas of the skin are involved. It may make you sleepy. So be careful using it in the daytime or when going to school, working, or driving. Note: Dont use diphenhydramine if you have glaucoma  or if you are a man with trouble urinating due to an enlarged prostate. There are other antihistamines that wont make you so sleepy. These are good choices for daytime use. Ask your pharmacist for suggestions.  · Dont use diphenhydramine cream on your skin. It can cause a further reaction in some people.  · To help prevent an infection, don't scratch the affected area. Scratching may worsen the reaction and damage your skin. It can also lead to an infection. Always check the affected for signs of an infection.  · Call your healthcare provider and ask what you can use to help decrease the itching.  · To decrease allergic reactions, try the following:    · Use heat-steam to clean your home  · Use high-efficiency particulate (HEPA) vacuums and filters  · Stay away from food and pet triggers  · Kill any cockroaches  · Clean your house often  Follow-up care  Follow up with your healthcare provider, or as advised. If you had a severe reaction today, or if you have had several mild to medium allergic reactions in the past, ask your provider about allergy testing. This can help you find out what you are allergic to. If your reaction included dizziness, fainting, or trouble breathing or swallowing, ask your provider about carrying auto-injectable epinephrine.  Call 911  Call 911 if any of these occur:  · Trouble breathing or swallowing, wheezing  · Cool, moist, pale skin  · Shortness of breath  · Hoarse voice or trouble speaking  · Confused   · Very drowsy or trouble awakening  · Fainting or loss of consciousness  · Rapid heart rate  · Feeling of dizziness or weakness or a sudden drop in blood pressure  · Feeling of doom  · Feeling lightheaded  · Severe nausea or vomiting, or diarrhea  · Seizure  · Swelling in the face, eyelids, lips, mouth, throat or tongue  · Drooling  When to seek medical advice  Call your healthcare provider right away if any of these occur:  · Spreading areas of itching, redness or swelling  · Nausea  or stomach cramps or abdominal pain  · Continuing or recurring symptoms  · Spreading areas of redness, swelling, or itching  · Signs of infection at the affected site:  ¨ Spreading redness  ¨ Increased pain or swelling  ¨ Fluid or colored drainage from the site  ¨ Fever of 100.4°F (38°C) or above lasting for 24 to 48 hours, or as directed by your provider  Date Last Reviewed: 3/1/2017  © 5968-0962 [employer]. [address] [place] [address], [place] [address]. All rights reserved. This information is not intended as a substitute for professional medical care. Always follow your healthcare professional's instructions.      Nonspecific Dermatitis  Dermatitis is a skin rash caused by something that touches the skin and makes it irritated and inflamed.  Your skin may be red, swollen, dry, and may be cracked. Blisters may form and ooze. The rash will itch.  Dermatitis can form on the face and neck, backs of hands, forearms, genitals, and lower legs. Dermatitis is not passed from person to person.  Talk with your health care provider about what may have caused the rash. Common things that cause skin allergies are metal in jewelry, plants like poison ivy or poison oak, and certain skin care products. You will need to avoid the source of your rash in the future to prevent it from coming back. In some cases, the cause of the dermatitis may not be found.  Treatment is done to relieve itching and prevent the rash from coming back. The rash should go away in a few days to a few weeks.  Home care  The health care provider may prescribe medications to relieve swelling and itching. Follow all instructions when using these medications.  · Avoid anything that heats up your skin, such as hot showers or baths, or direct sunlight. This can make itching worse.  · Stay away from whatever you think caused the rash.  · Bathe in warm, not hot, water. Apply a moisturizing lotion after bathing to prevent dry skin.  · Avoid skin  irritants such as wool or silk clothing, grease, oils, harsh soaps, and detergents.  · Apply cold compresses to soothe your sores to help relieve your symptoms. Do this for 30 minutes 3 to 4 times a day. You can make a cold compress by soaking a cloth in cold water. Squeeze out excess water. You can add colloidal oatmeal to the water to help reduce itching. For severe itching in a small area, apply an ice pack wrapped in a thin towel. Do this for 20 minutes 3 to 4 times a day.  · You can also help relieve large areas of itching by taking a lukewarm bath with colloidal oatmeal added to the water.  · Use hydrocortisone cream for redness and irritation, unless another medicine was prescribed. You can also use benzocaine anesthetic cream or spray.  · Use oral diphenhydramine to help reduce itching. This is an antihistamine you can buy at drug and grocery stores. It can make you sleepy, so use lower doses during the daytime. Or you can use loratadine. This is an antihistamine that will not make you sleepy. Dont use diphenhydramine if you have glaucoma or have trouble urinating because of an enlarged prostate.  · Wash your hands or use an antibacterial gel often to prevent the spread of the rash.  Follow-up care  Follow up with your health care provider. Make an appointment with your health care provider if your symptoms do not get better in the next 1 to 2 weeks.  When to seek medical advice  Call your health care provider right away if any of these occur:  · Spreading of the rash to other parts of your body  · Severe swelling of your face, eyelids, mouth, throat or tongue  · Trouble urinating due to swelling in the genital area  · Fever of 100.4°F (38°C) or higher  · Redness or swelling that gets worse  · Pain that gets worse  · Foul-smelling fluid leaking from the skin  · Yellow-brown crusts on the open blisters  · Joint pain   Date Last Reviewed: 7/23/2014  © 8076-8063 [employer]. [address] [place] [address]  Road, [place] [address]. All rights reserved. This information is not intended as a substitute for professional medical care. Always follow your healthcare professional's instructions.

## 2018-11-11 NOTE — PROGRESS NOTES
"Subjective:       Patient ID: Ermelinda Verde is a 57 y.o. female.    Vitals:  height is 5' 5" (1.651 m) and weight is 85.7 kg (189 lb). Her oral temperature is 98.7 °F (37.1 °C). Her blood pressure is 112/66 and her pulse is 85. Her respiration is 20 and oxygen saturation is 96%.     Chief Complaint: Rash    57-year-old female presents to clinic today with complaints of a rash that has been present for approximately 1 week now.  Patient states that the rash started on a dorsum of her hands and spread to her wrist.  Over the past 2 days, she states that she has noticed it on both of her lower extremities.  Patient states that she has been drinking a new juice and using essential oils.  She is unsure if either 1 of these have caused the reaction.  Patient has since discontinued use of both of these.  Patient denies any new foods, medications, contacts, or exposures.  She denies any other complaints at this time.      Rash   This is a new problem. The current episode started in the past 7 days. The affected locations include the left hand, right hand, right upper leg, right lower leg, left upper leg and left lower leg. The rash is characterized by itchiness and swelling. She was exposed to nothing. Pertinent negatives include no diarrhea, fever, joint pain, shortness of breath, sore throat or vomiting. Past treatments include nothing.     Review of Systems   Constitution: Negative for chills and fever.   HENT: Negative for sore throat.    Cardiovascular: Negative for chest pain.   Respiratory: Negative for shortness of breath.    Skin: Positive for itching and rash.   Musculoskeletal: Negative for joint pain.   Gastrointestinal: Negative for abdominal pain, diarrhea, nausea and vomiting.   All other systems reviewed and are negative.      Objective:      Physical Exam   Constitutional: She is oriented to person, place, and time. She appears well-developed and well-nourished.   HENT:   Head: Normocephalic and " atraumatic. Head is without abrasion, without contusion and without laceration.   Right Ear: External ear normal.   Left Ear: External ear normal.   Nose: Nose normal.   Mouth/Throat: Oropharynx is clear and moist.   Eyes: Conjunctivae, EOM and lids are normal. Pupils are equal, round, and reactive to light.   Neck: Trachea normal, full passive range of motion without pain and phonation normal. Neck supple.   Cardiovascular: Normal rate, regular rhythm and normal heart sounds.   Pulmonary/Chest: Effort normal and breath sounds normal. No stridor. No respiratory distress.   Musculoskeletal: Normal range of motion.   Neurological: She is alert and oriented to person, place, and time.   Skin: Skin is warm, dry and intact. Capillary refill takes less than 2 seconds. Rash noted. No abrasion, no bruising, no burn, no ecchymosis, no laceration and no lesion noted. Rash is maculopapular. No erythema.        Psychiatric: She has a normal mood and affect. Her speech is normal and behavior is normal. Judgment and thought content normal. Cognition and memory are normal.   Nursing note and vitals reviewed.      Assessment:       1. Dermatitis        Plan:         Dermatitis  -     dexamethasone injection 10 mg  -     Discontinue: predniSONE (DELTASONE) 20 MG tablet; Take 1 tablet (20 mg total) by mouth 2 (two) times daily. Start ashley. for 4 days  Dispense: 8 tablet; Refill: 0  -     cetirizine (ZYRTEC) 10 MG tablet; Take 1 tablet (10 mg total) by mouth once daily. for 10 days  Dispense: 10 tablet; Refill: 0  -     triamcinolone acetonide 0.1% (KENALOG) 0.1 % cream; Apply topically 2 (two) times daily. for 7 days  Dispense: 45 g; Refill: 0     Will treat patient's rash as above.  If no improvement in her symptoms over the next 7-10 days, will refer patient to Dermatology for further evaluation.    Patient Instructions   1.  Take all medications as directed. If you have been prescribed antibiotics, make sure to complete them.   2.   Rest and keep yourself/patient well hydrated. For adults, it is recommended to drink at least 8-10 glasses of water daily.   3.  For patients above 6 months of age who are not allergic to and are not on anticoagulants, you can alternate Tylenol and Motrin every 4-6 hours for fever above 100.4F and/or pain.  For patients less than 6 months of age, allergic to or intolerant to NSAIDS, have gastritis, gastric ulcers, or history of GI bleeds, are pregnant, or are on anticoagulant therapy, you can take Tylenol every 4 hours as needed for fever above 100.4F and/or pain.   4. You should schedule a follow-up appointment with your Primary Care Provider/Pediatrician for recheck in 2-3 days or as directed at this visit.   5.  If your condition fails to improve in a timely manner, you should receive another evaluation by your Primary Care Provider/Pediatrician to discuss your concerns or return to urgent care for a recheck.  If your condition worsens at any time, you should report immediately to your nearest Emergency Department for further evaluation. **You must understand that you have received Urgent Care treatment only and that you may be released before all of your medical problems are known or treated. You, the patient, are responsible to arrange for follow-up care as instructed.         General Allergic Reactions  An allergic reaction is a set of symptoms caused by an allergen. An allergen is something that causes a persons immune system to react. When a person comes in contact with an allergen, it causes the body to release chemicals. These include the chemical histamine. Histamine causes swelling and itching. It may affect the entire body. This is called a general allergic reaction. Often symptoms affect only 1 part of the body. This is called a local allergic reaction.  You are having an allergic reaction. Almost anything can cause one. Different people are allergic to different things. It is usually something that  you ate or swallowed, came into contact with by getting or putting it on your skin or clothes, or something you breathed in the air. This can be very annoying and sometimes scary.  Most of us think of allergic reactions when we have a rash or itchy skin. Symptoms can include:  · Itching of the eyes, nose, and roof of the mouth  · Runny or stuffy nose  · Watery eyes   · Sneezing or coughing   · A blocked feeling in the ear  · Red, itchy rash called hives  · Red and purple spots  · Rash, redness, welts, blisters  · Itching, burning, stinging, pain  · Dry, flaky, cracking, scaly skin  Severe symptoms include:  · Swelling of the face, lips, or other parts of the body  · Hoarse voice  · Trouble swallowing, feeling like your throat is closing  · Trouble breathing, wheezing  · Nausea, vomiting, diarrhea, stomach cramps  · Feeling faint or lightheaded, rapid heart rate  Sometimes the cause may be obvious. But there are so many things that can cause a reaction that you may not be able to figure out. The most important things to help find your allergen are:  · Remembering when it started  · What you were doing at the time or just before that  · Any activities you were involved in  · Any new products or contacts  Below are some common causes. But remember that almost anything can cause a reaction. You may not even be aware that you came into contact with one of these things:  · Dust, mold, pollen  · Plants (common ones are poison ivy and poison oak, but there are many others)   · Animals  · Foods such as shrimp, shellfish, peanuts, milk products, gluten, and eggs. Also food colorings, flavorings, and additives.  · Insect bites or stings such as bees, mosquitos, fleas, ticks  · Medicines such as penicillin, sulfa medicines, amoxicillin, aspirin, and ibuprofen. But any medicine can cause a reaction.  · Jewelry such as nickel or gold. This can be new, or something youve worn for a while, including zippers and buttons.  · Latex  such as in gloves, clothes, toys, balloons, or some tapes. Some people allergic to latex may also have problems with foods like bananas, avocados, kiwi, papaya, or chestnuts.  · Lotions, perfumes, cosmetics, soaps, shampoos, skincare products, nail products  · Chemicals or dyes in clothing, linen, , hair dyes, soaps, iodine  Many viruses and common colds can cause a rash that is not an allergic reaction. Sometimes it is hard to tell the difference between allergies, sensitivity, or an intolerance to something. This is especially true with food. Many things can cause diarrhea, vomiting, stomach cramps, and skin irritation.  Home care    The goal of treatment is to help relieve the symptoms and get you feeling better. The rash will usually fade over several days. But it can sometimes last a couple of weeks. Over the next couple of days, there may be times when it is gets a little worse, and then better again. Here are some things to do:  · If you know what you are allergic to, stay away from it. Future reactions could be worse than this one.  · Avoid tight clothing and anything that heats up your skin (hot showers or baths, direct sunlight). Heat will make itching worse.  · An ice pack will relieve local areas of intense itching and redness. To make an ice pack, put ice cubes in a plastic bag that seals at the top. Wrap it in a thin, clean towel. Dont put the ice directly on the skin because it can damage the skin.  · Oral diphenhydramine is an over-the-counter antihistamine sold at pharmacy and grocery stores. Unless a prescription antihistamine was given, diphenhydramine may be used to reduce itching if large areas of the skin are involved. It may make you sleepy. So be careful using it in the daytime or when going to school, working, or driving. Note: Dont use diphenhydramine if you have glaucoma or if you are a man with trouble urinating due to an enlarged prostate. There are other antihistamines that  wont make you so sleepy. These are good choices for daytime use. Ask your pharmacist for suggestions.  · Dont use diphenhydramine cream on your skin. It can cause a further reaction in some people.  · To help prevent an infection, don't scratch the affected area. Scratching may worsen the reaction and damage your skin. It can also lead to an infection. Always check the affected for signs of an infection.  · Call your healthcare provider and ask what you can use to help decrease the itching.  · To decrease allergic reactions, try the following:    · Use heat-steam to clean your home  · Use high-efficiency particulate (HEPA) vacuums and filters  · Stay away from food and pet triggers  · Kill any cockroaches  · Clean your house often  Follow-up care  Follow up with your healthcare provider, or as advised. If you had a severe reaction today, or if you have had several mild to medium allergic reactions in the past, ask your provider about allergy testing. This can help you find out what you are allergic to. If your reaction included dizziness, fainting, or trouble breathing or swallowing, ask your provider about carrying auto-injectable epinephrine.  Call 911  Call 911 if any of these occur:  · Trouble breathing or swallowing, wheezing  · Cool, moist, pale skin  · Shortness of breath  · Hoarse voice or trouble speaking  · Confused   · Very drowsy or trouble awakening  · Fainting or loss of consciousness  · Rapid heart rate  · Feeling of dizziness or weakness or a sudden drop in blood pressure  · Feeling of doom  · Feeling lightheaded  · Severe nausea or vomiting, or diarrhea  · Seizure  · Swelling in the face, eyelids, lips, mouth, throat or tongue  · Drooling  When to seek medical advice  Call your healthcare provider right away if any of these occur:  · Spreading areas of itching, redness or swelling  · Nausea or stomach cramps or abdominal pain  · Continuing or recurring symptoms  · Spreading areas of redness,  swelling, or itching  · Signs of infection at the affected site:  ¨ Spreading redness  ¨ Increased pain or swelling  ¨ Fluid or colored drainage from the site  ¨ Fever of 100.4°F (38°C) or above lasting for 24 to 48 hours, or as directed by your provider  Date Last Reviewed: 3/1/2017  © 2312-6497 [employer]. [address] [place] [address]. All rights reserved. This information is not intended as a substitute for professional medical care. Always follow your healthcare professional's instructions.      Nonspecific Dermatitis  Dermatitis is a skin rash caused by something that touches the skin and makes it irritated and inflamed.  Your skin may be red, swollen, dry, and may be cracked. Blisters may form and ooze. The rash will itch.  Dermatitis can form on the face and neck, backs of hands, forearms, genitals, and lower legs. Dermatitis is not passed from person to person.  Talk with your health care provider about what may have caused the rash. Common things that cause skin allergies are metal in jewelry, plants like poison ivy or poison oak, and certain skin care products. You will need to avoid the source of your rash in the future to prevent it from coming back. In some cases, the cause of the dermatitis may not be found.  Treatment is done to relieve itching and prevent the rash from coming back. The rash should go away in a few days to a few weeks.  Home care  The health care provider may prescribe medications to relieve swelling and itching. Follow all instructions when using these medications.  · Avoid anything that heats up your skin, such as hot showers or baths, or direct sunlight. This can make itching worse.  · Stay away from whatever you think caused the rash.  · Bathe in warm, not hot, water. Apply a moisturizing lotion after bathing to prevent dry skin.  · Avoid skin irritants such as wool or silk clothing, grease, oils, harsh soaps, and detergents.  · Apply cold compresses to  soothe your sores to help relieve your symptoms. Do this for 30 minutes 3 to 4 times a day. You can make a cold compress by soaking a cloth in cold water. Squeeze out excess water. You can add colloidal oatmeal to the water to help reduce itching. For severe itching in a small area, apply an ice pack wrapped in a thin towel. Do this for 20 minutes 3 to 4 times a day.  · You can also help relieve large areas of itching by taking a lukewarm bath with colloidal oatmeal added to the water.  · Use hydrocortisone cream for redness and irritation, unless another medicine was prescribed. You can also use benzocaine anesthetic cream or spray.  · Use oral diphenhydramine to help reduce itching. This is an antihistamine you can buy at drug and grocery stores. It can make you sleepy, so use lower doses during the daytime. Or you can use loratadine. This is an antihistamine that will not make you sleepy. Dont use diphenhydramine if you have glaucoma or have trouble urinating because of an enlarged prostate.  · Wash your hands or use an antibacterial gel often to prevent the spread of the rash.  Follow-up care  Follow up with your health care provider. Make an appointment with your health care provider if your symptoms do not get better in the next 1 to 2 weeks.  When to seek medical advice  Call your health care provider right away if any of these occur:  · Spreading of the rash to other parts of your body  · Severe swelling of your face, eyelids, mouth, throat or tongue  · Trouble urinating due to swelling in the genital area  · Fever of 100.4°F (38°C) or higher  · Redness or swelling that gets worse  · Pain that gets worse  · Foul-smelling fluid leaking from the skin  · Yellow-brown crusts on the open blisters  · Joint pain   Date Last Reviewed: 7/23/2014  © 8063-9407 [employer]. [address] [place] [address], [place] [address]. All rights reserved. This information is not intended as a substitute for professional  medical care. Always follow your healthcare professional's instructions.

## 2018-11-12 ENCOUNTER — TELEPHONE (OUTPATIENT)
Dept: HEMATOLOGY/ONCOLOGY | Facility: CLINIC | Age: 58
End: 2018-11-12

## 2018-11-12 NOTE — TELEPHONE ENCOUNTER
Returned call back to [facility]. Was not able to reach a representative Karla. Was transferred 3 times, then got disconnected. Will try to call back again.        ----- Message from Óscar King sent at 11/12/2018  9:09 AM CST -----  Contact: [facility]   Message for MA/RN/Provider     Who called: Karla  Message: Calling to speak with a nurse to verify the instructions that were sent over for the pt.   Contact preferences: [phone]  Additional Information: N/A

## 2018-11-13 ENCOUNTER — OFFICE VISIT (OUTPATIENT)
Dept: SURGERY | Facility: CLINIC | Age: 58
End: 2018-11-13
Payer: COMMERCIAL

## 2018-11-13 ENCOUNTER — HOSPITAL ENCOUNTER (OUTPATIENT)
Dept: RADIOLOGY | Facility: HOSPITAL | Age: 58
Discharge: HOME | End: 2018-11-13
Attending: SURGERY
Payer: COMMERCIAL

## 2018-11-13 VITALS
BODY MASS INDEX: 31.49 KG/M2 | DIASTOLIC BLOOD PRESSURE: 72 MMHG | WEIGHT: 189 LBS | HEIGHT: 65 IN | HEART RATE: 108 BPM | TEMPERATURE: 98 F | SYSTOLIC BLOOD PRESSURE: 132 MMHG

## 2018-11-13 VITALS — BODY MASS INDEX: 31.49 KG/M2 | WEIGHT: 189 LBS | HEIGHT: 65 IN

## 2018-11-13 DIAGNOSIS — Z17.1 MALIGNANT NEOPLASM OF UPPER-OUTER QUADRANT OF LEFT BREAST IN FEMALE, ESTROGEN RECEPTOR NEGATIVE: ICD-10-CM

## 2018-11-13 DIAGNOSIS — C50.412 MALIGNANT NEOPLASM OF UPPER-OUTER QUADRANT OF LEFT BREAST IN FEMALE, ESTROGEN RECEPTOR NEGATIVE: ICD-10-CM

## 2018-11-13 DIAGNOSIS — C50.412 MALIGNANT NEOPLASM OF UPPER-OUTER QUADRANT OF LEFT BREAST IN FEMALE, ESTROGEN RECEPTOR NEGATIVE: Primary | Chronic | ICD-10-CM

## 2018-11-13 DIAGNOSIS — Z17.1 MALIGNANT NEOPLASM OF UPPER-OUTER QUADRANT OF LEFT BREAST IN FEMALE, ESTROGEN RECEPTOR NEGATIVE: Primary | Chronic | ICD-10-CM

## 2018-11-13 PROCEDURE — 3075F SYST BP GE 130 - 139MM HG: CPT | Mod: CPTII,S$GLB,, | Performed by: SURGERY

## 2018-11-13 PROCEDURE — 77061 BREAST TOMOSYNTHESIS UNI: CPT | Mod: 26,,, | Performed by: RADIOLOGY

## 2018-11-13 PROCEDURE — 3008F BODY MASS INDEX DOCD: CPT | Mod: CPTII,S$GLB,, | Performed by: SURGERY

## 2018-11-13 PROCEDURE — 77065 DX MAMMO INCL CAD UNI: CPT | Mod: TC,PO

## 2018-11-13 PROCEDURE — 3078F DIAST BP <80 MM HG: CPT | Mod: CPTII,S$GLB,, | Performed by: SURGERY

## 2018-11-13 PROCEDURE — 77065 DX MAMMO INCL CAD UNI: CPT | Mod: 26,,, | Performed by: RADIOLOGY

## 2018-11-13 PROCEDURE — 99999 PR PBB SHADOW E&M-EST. PATIENT-LVL III: CPT | Mod: PBBFAC,,, | Performed by: SURGERY

## 2018-11-13 PROCEDURE — 99212 OFFICE O/P EST SF 10 MIN: CPT | Mod: S$GLB,,, | Performed by: SURGERY

## 2018-11-13 PROCEDURE — 77061 BREAST TOMOSYNTHESIS UNI: CPT | Mod: TC,PO

## 2018-11-13 NOTE — PROGRESS NOTES
Breast Surgery  [facility]  Department of Surgery        REFERRING PROVIDER: Alfredo English  MEDICAL ONCOLOGIST: Dr. Alex Reyna    Chief Complaint: f/u annual right breast mmg        Subjective:      Patient ID: Ermelinda Verde is a 56 y.o. female who recently was diagnosed with metastatic breast cancer to supraclavicular and cervical chain nodes and has now started chemotherapy with Dr. Reyna. She otherwise has no complaints. No CP, SOB, Back or bony pain, HA or vision changes.    She is s/p left skin sparing total mastectomy with SCOTT flap reconstruction (Dr. Sewell) on 6/26/18.      Due to the fact that she had residual malignancy at time of surgery, she received additional chemotherapy with single agent Adriamycin for 4 cycles given in a dose dense fashion.    Last mammogram was April 2018 which was negative except for small cyst-appearing structure in lower inner quadrant left breast, which is palpable, but not growing per pt.     Oncologic History:  She developed a palpable abnormality in her left breast in January 2017 which she noted on self-examination. A diagnostic mammogram on January 19 showed a greater than 1 cm nodule in the upper outer portion of left breast.  By ultrasound this was lobulated and hypoechoic measuring 1.75 x 1.51 x 1.96 cm. On January 24, 2017 a core needle biopsy was performed which showed infiltrating ductal carcinoma, high grade.  The tumor was ER negative, CA negative, and HER-2 negative. A follow-up ultrasound on February 6 showed 2.5 x 2.2 x 1.5 cm left breast mass.  There was no abnormality noted in the left axilla. She underwent sentinel lymph node biopsy on February 22.  That showed 4 negative lymph nodes.     She had 4 cycles of  Wilbur-adjuvantTaxotere and Cytoxan completed  on 5/9/17.     On June 26 she underwent left mastectomy.  That revealed 2 foci of invasive high-grade carcinoma measuring 14 mm and 1.5 mm.  Margins were negative.     She completed 4 cycles of  adjuvant Adriamycin on 2017.     She then developed a palpable supraclavicular LN in Oct 2018 which was biopsied and showed triple negative metastatic adenocarcinoma. PET scan (10/11/18) showed multiple enlarged left-sided hypermetabolic nodes are present involving the left lower cervical/supraclavicular chain, infraclavicular region, and retropectoral region.  Index left supraclavicular node (for axis measuring 2.0 cm) has SUV max 27.2 and index left retropectoral node (ill-defined with short axis measuring approximately 2.1 cm) has SUV max 27.2    BRCA 1/2 negative.            Past Medical History:   Diagnosis Date    Cancer       breast    Hypertension              Past Surgical History:   Procedure Laterality Date    BREAST SURGERY   2017     left breast bx     SECTION        D&C        PORTACATH PLACEMENT        SENTINEL LYMPH NODE BIOPSY   2017     left    UTERINE FIBROID SURGERY                 Current Outpatient Prescriptions on File Prior to Visit   Medication Sig Dispense Refill    amlodipine-benazepril (LOTREL) 10-40 mg per capsule TK 1 C PO QD   3    CALCIUM CARBONATE (CALCIUM 500 ORAL) Take by mouth.        cyanocobalamin (VITAMIN B-12) 500 MCG tablet Take 500 mcg by mouth once daily.        DOCUSATE CALCIUM (STOOL SOFTENER ORAL) Take by mouth.        hydrochlorothiazide (HYDRODIURIL) 25 MG tablet TK 1 T PO ONCE A DAY   3    metoprolol succinate (TOPROL-XL) 50 MG 24 hr tablet TK 1/2 T PO QD   3    potassium chloride SA (K-DUR,KLOR-CON) 20 MEQ tablet TK 1 T PO  QD   3    ferrous gluconate (FERGON) 324 MG tablet TK 1 T PO QD   3      No current facility-administered medications on file prior to visit.       Social History   Social History            Social History    Marital status:        Spouse name: N/A    Number of children: N/A    Years of education: N/A          Occupational History    Not on file.             Social History Main Topics     Smoking status: Never Smoker    Smokeless tobacco: Not on file    Alcohol use Yes         Comment: wine    Drug use: No    Sexual activity: Not on file           Other Topics Concern    Not on file          Social History Narrative    No narrative on file                Family History   Problem Relation Age of Onset    Heart disease Father      Breast cancer Sister         at age 52 or 53        Objective:     Vitals:    11/13/18 1408   BP: 132/72   Pulse: 108   Temp: 97.9 °F (36.6 °C)         Physical Exam   Constitutional: She is oriented to person, place, and time. She appears well-developed and well-nourished.   Neck: Normal range of motion. Neck supple. There is a 2x2.5 cm node at the base of the neck in the supraclavicular space.  I do not feel additional LAD, no axillary LAD.  Cardiovascular: Normal rate, regular rhythm and normal heart sounds.    Pulmonary/Chest: Effort normal and breath sounds normal.   Well healed incisional scars of the left and right breast around the nipple and inferior to the nipple. Approximately 2cm, freely mobile palpable mass in the lower inner quadrant of the left breast flap at site of known oil cyst.  Left supraclavicular region has fullness along entire length of clavicle, as well as some palpable cervical LAD. Non tender.  Abdominal: Soft. Bowel sounds are normal.   Musculoskeletal: Normal range of motion.   Neurological: She is alert and oriented to person, place, and time.      Supraclavicular LN Bx 10/19/18:  Positive for metastatic adenocarcinoma, ER/[place] -, HER2 -    Imaging:  PET 10/11/18:  Multiple enlarged left-sided hypermetabolic nodes are present involving the left lower cervical/supraclavicular chain, infraclavicular region, and retropectoral region.  Index left supraclavicular node (for axis measuring 2.0 cm) has SUV max 27.2 and index left retropectoral node (ill-defined with short axis measuring approximately 2.1 cm) has SUV max 27.2.    Right Mammogram  11/13/18:  Computer-aided detection was utilized in the interpretation of this examination. This procedure was performed using tomosynthesis.       The breast is heterogeneously dense, which may obscure small masses. There is no evidence of suspicious masses, microcalcifications or architectural distortion.  Status post right breast reduction. Status post contralateral mastectomy.  Only the remaining breast has been imaged.      Impression:  No mammographic evidence of malignancy.     BI-RADS Category 1: Negative     Recommendation:  Routine screening mammogram in 1 year is recommended.     Assessment:       Ms. Wadsworth is a 58 yo F s/p left total mastectomy skin sparing with DEIP flap reconstruction on 6/26/18 for [id number], Stage IA triple negative IDC presenting with biopsy proven metastatic disease in her left supraclavicular LN, and more PET +  Nodes along left cervical and retropectoral LN chain who has started chemotherapy for her metastatic disease    Plan:      - Follow up with [facility]  in 6 months.  - Contralateral mammogram today clear  - Continue follow up with medical oncology - will need to continue additional treatment  - Call with any questions or concerns

## 2018-11-14 ENCOUNTER — INFUSION (OUTPATIENT)
Dept: INFUSION THERAPY | Facility: HOSPITAL | Age: 58
End: 2018-11-14
Attending: INTERNAL MEDICINE
Payer: COMMERCIAL

## 2018-11-14 ENCOUNTER — LAB VISIT (OUTPATIENT)
Dept: LAB | Facility: HOSPITAL | Age: 58
End: 2018-11-14
Attending: INTERNAL MEDICINE
Payer: COMMERCIAL

## 2018-11-14 VITALS
TEMPERATURE: 98 F | RESPIRATION RATE: 18 BRPM | WEIGHT: 189 LBS | DIASTOLIC BLOOD PRESSURE: 70 MMHG | BODY MASS INDEX: 31.49 KG/M2 | SYSTOLIC BLOOD PRESSURE: 134 MMHG | HEIGHT: 65 IN | HEART RATE: 97 BPM

## 2018-11-14 DIAGNOSIS — C50.412 MALIGNANT NEOPLASM OF UPPER-OUTER QUADRANT OF LEFT BREAST IN FEMALE, ESTROGEN RECEPTOR NEGATIVE: ICD-10-CM

## 2018-11-14 DIAGNOSIS — Z51.11 ENCOUNTER FOR ANTINEOPLASTIC CHEMOTHERAPY: Primary | ICD-10-CM

## 2018-11-14 DIAGNOSIS — Z51.11 ENCOUNTER FOR ANTINEOPLASTIC CHEMOTHERAPY: ICD-10-CM

## 2018-11-14 DIAGNOSIS — Z17.1 MALIGNANT NEOPLASM OF UPPER-OUTER QUADRANT OF LEFT BREAST IN FEMALE, ESTROGEN RECEPTOR NEGATIVE: Chronic | ICD-10-CM

## 2018-11-14 DIAGNOSIS — Z17.1 MALIGNANT NEOPLASM OF UPPER-OUTER QUADRANT OF LEFT BREAST IN FEMALE, ESTROGEN RECEPTOR NEGATIVE: ICD-10-CM

## 2018-11-14 DIAGNOSIS — C50.412 MALIGNANT NEOPLASM OF UPPER-OUTER QUADRANT OF LEFT BREAST IN FEMALE, ESTROGEN RECEPTOR NEGATIVE: Chronic | ICD-10-CM

## 2018-11-14 LAB
ALBUMIN SERPL BCP-MCNC: 3.9 G/DL
ALP SERPL-CCNC: 99 U/L
ALT SERPL W/O P-5'-P-CCNC: 16 U/L
ANION GAP SERPL CALC-SCNC: 8 MMOL/L
AST SERPL-CCNC: 22 U/L
BASOPHILS # BLD AUTO: 0.03 K/UL
BASOPHILS NFR BLD: 0.5 %
BILIRUB SERPL-MCNC: 0.4 MG/DL
BUN SERPL-MCNC: 11 MG/DL
CALCIUM SERPL-MCNC: 10 MG/DL
CHLORIDE SERPL-SCNC: 105 MMOL/L
CO2 SERPL-SCNC: 27 MMOL/L
CREAT SERPL-MCNC: 0.8 MG/DL
DIFFERENTIAL METHOD: ABNORMAL
EOSINOPHIL # BLD AUTO: 0.3 K/UL
EOSINOPHIL NFR BLD: 4.6 %
ERYTHROCYTE [DISTWIDTH] IN BLOOD BY AUTOMATED COUNT: 14.7 %
EST. GFR  (AFRICAN AMERICAN): >60 ML/MIN/1.73 M^2
EST. GFR  (NON AFRICAN AMERICAN): >60 ML/MIN/1.73 M^2
GLUCOSE SERPL-MCNC: 91 MG/DL
HCT VFR BLD AUTO: 39.9 %
HGB BLD-MCNC: 12.3 G/DL
IMM GRANULOCYTES # BLD AUTO: 0.02 K/UL
IMM GRANULOCYTES NFR BLD AUTO: 0.4 %
LYMPHOCYTES # BLD AUTO: 1.7 K/UL
LYMPHOCYTES NFR BLD: 29.1 %
MCH RBC QN AUTO: 26.7 PG
MCHC RBC AUTO-ENTMCNC: 30.8 G/DL
MCV RBC AUTO: 87 FL
MONOCYTES # BLD AUTO: 0.3 K/UL
MONOCYTES NFR BLD: 6 %
NEUTROPHILS # BLD AUTO: 3.4 K/UL
NEUTROPHILS NFR BLD: 59.4 %
NRBC BLD-RTO: 0 /100 WBC
PLATELET # BLD AUTO: 234 K/UL
PMV BLD AUTO: 9.7 FL
POTASSIUM SERPL-SCNC: 4.3 MMOL/L
PROT SERPL-MCNC: 8.1 G/DL
RBC # BLD AUTO: 4.6 M/UL
SODIUM SERPL-SCNC: 140 MMOL/L
WBC # BLD AUTO: 5.71 K/UL

## 2018-11-14 PROCEDURE — 36415 COLL VENOUS BLD VENIPUNCTURE: CPT

## 2018-11-14 PROCEDURE — 25000003 PHARM REV CODE 250: Performed by: INTERNAL MEDICINE

## 2018-11-14 PROCEDURE — 96413 CHEMO IV INFUSION 1 HR: CPT

## 2018-11-14 PROCEDURE — A4216 STERILE WATER/SALINE, 10 ML: HCPCS | Performed by: INTERNAL MEDICINE

## 2018-11-14 PROCEDURE — 80053 COMPREHEN METABOLIC PANEL: CPT

## 2018-11-14 PROCEDURE — 63600175 PHARM REV CODE 636 W HCPCS: Performed by: INTERNAL MEDICINE

## 2018-11-14 PROCEDURE — 85025 COMPLETE CBC W/AUTO DIFF WBC: CPT

## 2018-11-14 PROCEDURE — 36593 DECLOT VASCULAR DEVICE: CPT

## 2018-11-14 RX ORDER — HEPARIN 100 UNIT/ML
500 SYRINGE INTRAVENOUS
Status: DISCONTINUED | OUTPATIENT
Start: 2018-11-14 | End: 2018-11-14 | Stop reason: HOSPADM

## 2018-11-14 RX ORDER — SODIUM CHLORIDE 0.9 % (FLUSH) 0.9 %
10 SYRINGE (ML) INJECTION
Status: DISCONTINUED | OUTPATIENT
Start: 2018-11-14 | End: 2018-11-14 | Stop reason: HOSPADM

## 2018-11-14 RX ADMIN — Medication 10 ML: at 12:11

## 2018-11-14 RX ADMIN — HEPARIN 500 UNITS: 100 SYRINGE at 12:11

## 2018-11-14 RX ADMIN — PACLITAXEL 250 MG: 100 INJECTION, POWDER, LYOPHILIZED, FOR SUSPENSION INTRAVENOUS at 12:11

## 2018-11-14 RX ADMIN — ALTEPLASE 2 MG: 2.2 INJECTION, POWDER, LYOPHILIZED, FOR SOLUTION INTRAVENOUS at 10:11

## 2018-11-14 RX ADMIN — SODIUM CHLORIDE: 0.9 INJECTION, SOLUTION INTRAVENOUS at 09:11

## 2018-11-14 NOTE — PLAN OF CARE
Problem: Chemotherapy Effects (Adult)  Goal: Signs and Symptoms of Listed Potential Problems Will be Absent, Minimized or Managed (Chemotherapy Effects)  Signs and symptoms of listed potential problems will be absent, minimized or managed by discharge/transition of care (reference Chemotherapy Effects (Adult) CPG).  Outcome: Ongoing (interventions implemented as appropriate)  Pt here for abraxane infusion, labs, hx, meds, allergies reviewed, pt with no complaints does have rash to legs, reclined in chair, continue to monitor

## 2018-11-14 NOTE — PLAN OF CARE
Problem: Patient Care Overview  Goal: Plan of Care Review  Outcome: Ongoing (interventions implemented as appropriate)  Pt tolerated abraxane infusion without issue, pt to rtc 11/21/18, no distress noted upon d/c to home with spouse

## 2018-11-14 NOTE — NURSING
1120 still unable to aspirate blood from port , will let cath devin dwell longer    1220 6 ml blood aspirated from port easily, flushed with ease, will start chemo infusion

## 2018-11-14 NOTE — NURSING
1020 unable to aspirate blood from port flushes easily not painful, 2 ml cath devin instilled to port, will monitor

## 2018-11-21 ENCOUNTER — INFUSION (OUTPATIENT)
Dept: INFUSION THERAPY | Facility: HOSPITAL | Age: 58
End: 2018-11-21
Attending: INTERNAL MEDICINE
Payer: COMMERCIAL

## 2018-11-21 ENCOUNTER — LAB VISIT (OUTPATIENT)
Dept: LAB | Facility: HOSPITAL | Age: 58
End: 2018-11-21
Attending: INTERNAL MEDICINE
Payer: COMMERCIAL

## 2018-11-21 VITALS — SYSTOLIC BLOOD PRESSURE: 118 MMHG | DIASTOLIC BLOOD PRESSURE: 64 MMHG | RESPIRATION RATE: 18 BRPM | HEART RATE: 94 BPM

## 2018-11-21 DIAGNOSIS — C50.412 MALIGNANT NEOPLASM OF UPPER-OUTER QUADRANT OF LEFT BREAST IN FEMALE, ESTROGEN RECEPTOR NEGATIVE: ICD-10-CM

## 2018-11-21 DIAGNOSIS — C50.412 MALIGNANT NEOPLASM OF UPPER-OUTER QUADRANT OF LEFT BREAST IN FEMALE, ESTROGEN RECEPTOR NEGATIVE: Chronic | ICD-10-CM

## 2018-11-21 DIAGNOSIS — Z51.11 ENCOUNTER FOR ANTINEOPLASTIC CHEMOTHERAPY: ICD-10-CM

## 2018-11-21 DIAGNOSIS — Z51.11 ENCOUNTER FOR ANTINEOPLASTIC CHEMOTHERAPY: Primary | ICD-10-CM

## 2018-11-21 DIAGNOSIS — Z17.1 MALIGNANT NEOPLASM OF UPPER-OUTER QUADRANT OF LEFT BREAST IN FEMALE, ESTROGEN RECEPTOR NEGATIVE: ICD-10-CM

## 2018-11-21 DIAGNOSIS — Z17.1 MALIGNANT NEOPLASM OF UPPER-OUTER QUADRANT OF LEFT BREAST IN FEMALE, ESTROGEN RECEPTOR NEGATIVE: Chronic | ICD-10-CM

## 2018-11-21 LAB
ALBUMIN SERPL BCP-MCNC: 3.9 G/DL
ALP SERPL-CCNC: 92 U/L
ALT SERPL W/O P-5'-P-CCNC: 15 U/L
ANION GAP SERPL CALC-SCNC: 7 MMOL/L
AST SERPL-CCNC: 21 U/L
BASOPHILS # BLD AUTO: 0.02 K/UL
BASOPHILS NFR BLD: 0.7 %
BILIRUB SERPL-MCNC: 0.3 MG/DL
BUN SERPL-MCNC: 8 MG/DL
CALCIUM SERPL-MCNC: 9.9 MG/DL
CHLORIDE SERPL-SCNC: 105 MMOL/L
CO2 SERPL-SCNC: 26 MMOL/L
CREAT SERPL-MCNC: 0.8 MG/DL
DIFFERENTIAL METHOD: ABNORMAL
EOSINOPHIL # BLD AUTO: 0.2 K/UL
EOSINOPHIL NFR BLD: 7.4 %
ERYTHROCYTE [DISTWIDTH] IN BLOOD BY AUTOMATED COUNT: 14.8 %
EST. GFR  (AFRICAN AMERICAN): >60 ML/MIN/1.73 M^2
EST. GFR  (NON AFRICAN AMERICAN): >60 ML/MIN/1.73 M^2
GLUCOSE SERPL-MCNC: 83 MG/DL
HCT VFR BLD AUTO: 37.1 %
HGB BLD-MCNC: 11.6 G/DL
IMM GRANULOCYTES # BLD AUTO: 0 K/UL
IMM GRANULOCYTES NFR BLD AUTO: 0 %
LYMPHOCYTES # BLD AUTO: 1.3 K/UL
LYMPHOCYTES NFR BLD: 45.1 %
MCH RBC QN AUTO: 27.3 PG
MCHC RBC AUTO-ENTMCNC: 31.3 G/DL
MCV RBC AUTO: 87 FL
MONOCYTES # BLD AUTO: 0.3 K/UL
MONOCYTES NFR BLD: 10.8 %
NEUTROPHILS # BLD AUTO: 1.1 K/UL
NEUTROPHILS NFR BLD: 36 %
NRBC BLD-RTO: 0 /100 WBC
PLATELET # BLD AUTO: 248 K/UL
PMV BLD AUTO: 8.9 FL
POTASSIUM SERPL-SCNC: 4.4 MMOL/L
PROT SERPL-MCNC: 7.8 G/DL
RBC # BLD AUTO: 4.25 M/UL
SODIUM SERPL-SCNC: 138 MMOL/L
WBC # BLD AUTO: 2.97 K/UL

## 2018-11-21 PROCEDURE — 96417 CHEMO IV INFUS EACH ADDL SEQ: CPT

## 2018-11-21 PROCEDURE — 36415 COLL VENOUS BLD VENIPUNCTURE: CPT

## 2018-11-21 PROCEDURE — 80053 COMPREHEN METABOLIC PANEL: CPT

## 2018-11-21 PROCEDURE — 96413 CHEMO IV INFUSION 1 HR: CPT

## 2018-11-21 PROCEDURE — 63600175 PHARM REV CODE 636 W HCPCS: Mod: TB | Performed by: INTERNAL MEDICINE

## 2018-11-21 PROCEDURE — 25000003 PHARM REV CODE 250: Performed by: INTERNAL MEDICINE

## 2018-11-21 PROCEDURE — 85025 COMPLETE CBC W/AUTO DIFF WBC: CPT

## 2018-11-21 RX ORDER — SODIUM CHLORIDE 0.9 % (FLUSH) 0.9 %
10 SYRINGE (ML) INJECTION
Status: DISCONTINUED | OUTPATIENT
Start: 2018-11-21 | End: 2018-11-21 | Stop reason: HOSPADM

## 2018-11-21 RX ORDER — HEPARIN 100 UNIT/ML
500 SYRINGE INTRAVENOUS
Status: DISCONTINUED | OUTPATIENT
Start: 2018-11-21 | End: 2018-11-21 | Stop reason: HOSPADM

## 2018-11-21 RX ADMIN — ATEZOLIZUMAB 840 MG: 1200 INJECTION, SOLUTION INTRAVENOUS at 10:11

## 2018-11-21 RX ADMIN — PACLITAXEL 250 MG: 100 INJECTION, POWDER, LYOPHILIZED, FOR SUSPENSION INTRAVENOUS at 11:11

## 2018-11-21 NOTE — PLAN OF CARE
Problem: Patient Care Overview  Goal: Plan of Care Review  Outcome: Ongoing (interventions implemented as appropriate)  Pt tolerated Tecentriq/Abraxane well.  No s/s of reaction. Vitals stable, NAD.

## 2018-11-24 DIAGNOSIS — H93.13 TINNITUS OF BOTH EARS: ICD-10-CM

## 2018-11-26 RX ORDER — FLUTICASONE PROPIONATE 50 MCG
SPRAY, SUSPENSION (ML) NASAL
Qty: 16 ML | Refills: 5 | Status: ON HOLD | OUTPATIENT
Start: 2018-11-26 | End: 2019-02-13 | Stop reason: SDUPTHER

## 2018-11-26 NOTE — PROGRESS NOTES
"Subjective:       Patient ID: Ermelinda Verde is a 57 y.o. female.    Chief Complaint: Malignant neoplasm of upper-outer quadrant of left breast in    Mrs Verde is  a very pleasant 57-year-old -American female who returns for a diagnosis of left breast cancer.  She is a patient of Dr. Rose and also a patient of .    She recently startedpalliaitve nab-paclitaxel and Atelizumab after recent diagnosis of recurrent breast cancer.     C1D1: 11/7 abraxane/atelizumab  C1D8: 11/14 abraxane  C1D15:11/21abraxane/atelizumab  C1D22: today    She noticed red rash on dorsal aspect of hands, with associated pruritus that developed several days after first treatment-  spread to anterior region of upper and lower legs.   Rash developed several days after first treatment then improved, worsened again with d15 of atelizumab.    She has tried OTC hydrocortisone with some relief. She took zyrtec several times then discontinued.    No fever, chills, nausea, vomiting, shortness of breath.  She notes decrease in "pulling" sensation at left neck and chest wall.  Appetite has improved.             Onc History:  She developed a palpable abnormality in her left breast in January 2017 which she noted on self-examination.  A diagnostic mammogram on January 19 showed a greater than 1 cm nodule in the upper outer portion of left breast.  By ultrasound this was lobulated and hypoechoic measuring 1.75 x 1.51 x 1.96 cm.     On January 24, 2017 a core needle biopsy was performed which showed infiltrating ductal carcinoma, high grade.  The tumor was ER negative, SC negative, and HER-2 negative.  A follow-up ultrasound on December 6 showed 2.5 x 2.2 x 1.5 cm left breast mass.  There was no abnormality noted in the left axilla.     She underwent sentinel lymph node biopsy on February 22.  That showed 4 negative lymph nodes.     She had 4 cycles of  Wilbur-adjuvantTaxotere and Cytoxan completed  on 5/9/17.     On June 26 she underwent " left mastectomy.  That revealed 2 foci of invasive high-grade carcinoma measuring 14 mm and 1.5 mm.  Margins were negative.     She completed 4 cycles of adjuvant Adriamycin on September 12, 2017.        In October, she developed some left supraclavicular lymphadenopathy which turned out to be recurrence.     A fine-needle aspirate of the lymph node was performed on October 19th.  That showed metastatic carcinoma consistent with breast primary which was ER negative, MO negative and HER 2-negative.             Review of Systems   Constitutional: Positive for appetite change (improving). Negative for activity change, chills, diaphoresis, fatigue, fever and unexpected weight change.   HENT: Negative for congestion, rhinorrhea, sore throat and trouble swallowing.    Eyes: Negative for visual disturbance.   Respiratory: Negative for cough, chest tightness and shortness of breath.    Cardiovascular: Negative for chest pain, palpitations and leg swelling.   Gastrointestinal: Negative for abdominal pain, blood in stool, constipation, diarrhea, nausea and vomiting.   Genitourinary: Negative for dysuria, hematuria and vaginal bleeding.   Musculoskeletal: Negative for arthralgias, back pain and myalgias.   Skin: Positive for rash (see HPI). Negative for pallor.   Neurological: Negative for dizziness, weakness and headaches.   Hematological: Negative for adenopathy. Does not bruise/bleed easily.   Psychiatric/Behavioral: Negative for dysphoric mood and suicidal ideas. The patient is not nervous/anxious.        Objective:      Physical Exam   Constitutional: She is oriented to person, place, and time. She appears well-developed and well-nourished. No distress.   ECOG 0  Presents with      HENT:   Head: Normocephalic.   Mouth/Throat: Oropharynx is clear and moist. No oropharyngeal exudate.   Eyes: Conjunctivae are normal. Pupils are equal, round, and reactive to light. No scleral icterus.   Neck: Normal range of motion.  Neck supple. No thyromegaly present.   Cardiovascular: Normal rate and regular rhythm.   Pulmonary/Chest: Effort normal and breath sounds normal. No respiratory distress.   Right breast without mass, nodule or skin changes. Left breast reconstruction without mass, nodule or skin changes.   There is some soft tissue swelling/fullness in left supraclavicular area with supraclavicular and cervical adenopathy. With soft tissue swelling it is dificult to appreciate exact borders of lymph nodes, no tenderness to palpation.    Abdominal: Soft. Bowel sounds are normal. She exhibits no distension and no mass. There is no tenderness.   Musculoskeletal: Normal range of motion. She exhibits no edema or tenderness.   No spinal or paraspinal tenderness to palpation     Lymphadenopathy:     She has no cervical adenopathy.   Neurological: She is alert and oriented to person, place, and time. No cranial nerve deficit.   Skin: Skin is warm and dry.   Maculopapular rash at dorsal aspect of bilateral hands.  Same rash at bilateral thighs, extending distally to lateral aspect of both legs.  At proximal end of rash there is associated dry skin/peeling. No drainage.    Psychiatric: She has a normal mood and affect. Her behavior is normal. Thought content normal.   Vitals reviewed.      Assessment:       1. Malignant neoplasm of upper-outer quadrant of left breast in female, estrogen receptor negative    2. Regional lymph node metastasis present    3. Drug rash    4. Chemotherapy induced neutropenia        Plan:       1-2)She tolerated her first cycle quite well other than rash. Today is C1D22, she will return in one week to start C2D1.  Labs on that day.  3)Patient to apply kenalog cream to affected area BID. She will also use zyrtec.  She knows to call clinic if rash does not continue to improve.  4)Mild and no infectious signs/symptoms; expect ANC to normalize prior to starting cycle 2.   Distress Screening Results: Psychosocial  Distress screening score of Distress Score: 1 noted and reviewed. No intervention indicated.

## 2018-11-27 ENCOUNTER — OFFICE VISIT (OUTPATIENT)
Dept: HEMATOLOGY/ONCOLOGY | Facility: CLINIC | Age: 58
End: 2018-11-27
Payer: COMMERCIAL

## 2018-11-27 ENCOUNTER — LAB VISIT (OUTPATIENT)
Dept: LAB | Facility: HOSPITAL | Age: 58
End: 2018-11-27
Attending: INTERNAL MEDICINE
Payer: COMMERCIAL

## 2018-11-27 VITALS
DIASTOLIC BLOOD PRESSURE: 80 MMHG | TEMPERATURE: 98 F | BODY MASS INDEX: 31.7 KG/M2 | OXYGEN SATURATION: 100 % | HEART RATE: 96 BPM | RESPIRATION RATE: 16 BRPM | WEIGHT: 190.25 LBS | HEIGHT: 65 IN | SYSTOLIC BLOOD PRESSURE: 146 MMHG

## 2018-11-27 DIAGNOSIS — Z17.1 MALIGNANT NEOPLASM OF UPPER-OUTER QUADRANT OF LEFT BREAST IN FEMALE, ESTROGEN RECEPTOR NEGATIVE: Chronic | ICD-10-CM

## 2018-11-27 DIAGNOSIS — C50.412 MALIGNANT NEOPLASM OF UPPER-OUTER QUADRANT OF LEFT BREAST IN FEMALE, ESTROGEN RECEPTOR NEGATIVE: Chronic | ICD-10-CM

## 2018-11-27 DIAGNOSIS — C77.9 REGIONAL LYMPH NODE METASTASIS PRESENT: ICD-10-CM

## 2018-11-27 DIAGNOSIS — D70.1 CHEMOTHERAPY INDUCED NEUTROPENIA: ICD-10-CM

## 2018-11-27 DIAGNOSIS — L27.0 DRUG RASH: ICD-10-CM

## 2018-11-27 DIAGNOSIS — T45.1X5A CHEMOTHERAPY INDUCED NEUTROPENIA: ICD-10-CM

## 2018-11-27 DIAGNOSIS — Z17.1 MALIGNANT NEOPLASM OF UPPER-OUTER QUADRANT OF LEFT BREAST IN FEMALE, ESTROGEN RECEPTOR NEGATIVE: Primary | Chronic | ICD-10-CM

## 2018-11-27 DIAGNOSIS — Z51.11 ENCOUNTER FOR ANTINEOPLASTIC CHEMOTHERAPY: ICD-10-CM

## 2018-11-27 DIAGNOSIS — C50.412 MALIGNANT NEOPLASM OF UPPER-OUTER QUADRANT OF LEFT BREAST IN FEMALE, ESTROGEN RECEPTOR NEGATIVE: Primary | Chronic | ICD-10-CM

## 2018-11-27 LAB
ALBUMIN SERPL BCP-MCNC: 3.8 G/DL
ALP SERPL-CCNC: 89 U/L
ALT SERPL W/O P-5'-P-CCNC: 16 U/L
ANION GAP SERPL CALC-SCNC: 8 MMOL/L
AST SERPL-CCNC: 23 U/L
BASOPHILS # BLD AUTO: 0.03 K/UL
BASOPHILS NFR BLD: 1.1 %
BILIRUB SERPL-MCNC: 0.3 MG/DL
BUN SERPL-MCNC: 9 MG/DL
CALCIUM SERPL-MCNC: 9.8 MG/DL
CHLORIDE SERPL-SCNC: 106 MMOL/L
CO2 SERPL-SCNC: 26 MMOL/L
CREAT SERPL-MCNC: 0.8 MG/DL
DIFFERENTIAL METHOD: ABNORMAL
EOSINOPHIL # BLD AUTO: 0.1 K/UL
EOSINOPHIL NFR BLD: 2.9 %
ERYTHROCYTE [DISTWIDTH] IN BLOOD BY AUTOMATED COUNT: 15.2 %
EST. GFR  (AFRICAN AMERICAN): >60 ML/MIN/1.73 M^2
EST. GFR  (NON AFRICAN AMERICAN): >60 ML/MIN/1.73 M^2
GLUCOSE SERPL-MCNC: 86 MG/DL
HCT VFR BLD AUTO: 34.7 %
HGB BLD-MCNC: 10.6 G/DL
IMM GRANULOCYTES # BLD AUTO: 0.01 K/UL
IMM GRANULOCYTES NFR BLD AUTO: 0.4 %
LYMPHOCYTES # BLD AUTO: 1.1 K/UL
LYMPHOCYTES NFR BLD: 41.5 %
MCH RBC QN AUTO: 26.4 PG
MCHC RBC AUTO-ENTMCNC: 30.5 G/DL
MCV RBC AUTO: 86 FL
MONOCYTES # BLD AUTO: 0.2 K/UL
MONOCYTES NFR BLD: 6.5 %
NEUTROPHILS # BLD AUTO: 1.3 K/UL
NEUTROPHILS NFR BLD: 47.6 %
NRBC BLD-RTO: 0 /100 WBC
PLATELET # BLD AUTO: 273 K/UL
PMV BLD AUTO: 9.6 FL
POTASSIUM SERPL-SCNC: 4.2 MMOL/L
PROT SERPL-MCNC: 7.7 G/DL
RBC # BLD AUTO: 4.02 M/UL
SODIUM SERPL-SCNC: 140 MMOL/L
TSH SERPL DL<=0.005 MIU/L-ACNC: 0.91 UIU/ML
WBC # BLD AUTO: 2.75 K/UL

## 2018-11-27 PROCEDURE — 36415 COLL VENOUS BLD VENIPUNCTURE: CPT

## 2018-11-27 PROCEDURE — 99214 OFFICE O/P EST MOD 30 MIN: CPT | Mod: S$GLB,,, | Performed by: PHYSICIAN ASSISTANT

## 2018-11-27 PROCEDURE — 3008F BODY MASS INDEX DOCD: CPT | Mod: CPTII,S$GLB,, | Performed by: PHYSICIAN ASSISTANT

## 2018-11-27 PROCEDURE — 80053 COMPREHEN METABOLIC PANEL: CPT

## 2018-11-27 PROCEDURE — 3079F DIAST BP 80-89 MM HG: CPT | Mod: CPTII,S$GLB,, | Performed by: PHYSICIAN ASSISTANT

## 2018-11-27 PROCEDURE — 99999 PR PBB SHADOW E&M-EST. PATIENT-LVL IV: CPT | Mod: PBBFAC,,, | Performed by: PHYSICIAN ASSISTANT

## 2018-11-27 PROCEDURE — 3077F SYST BP >= 140 MM HG: CPT | Mod: CPTII,S$GLB,, | Performed by: PHYSICIAN ASSISTANT

## 2018-11-27 PROCEDURE — 84443 ASSAY THYROID STIM HORMONE: CPT

## 2018-11-27 PROCEDURE — 85025 COMPLETE CBC W/AUTO DIFF WBC: CPT

## 2018-11-27 NOTE — Clinical Note
Can you cancel her treatment ashley?She needs to return on 12/5 with cbc/cmp and abraxane/atelizumab (no MD visit) and then on 12/11 to see me with cbc/cmp and then abraxane on 12/12, thanks

## 2018-11-28 RX ORDER — HEPARIN 100 UNIT/ML
500 SYRINGE INTRAVENOUS
Status: CANCELLED | OUTPATIENT
Start: 2018-12-13

## 2018-11-28 RX ORDER — SODIUM CHLORIDE 0.9 % (FLUSH) 0.9 %
10 SYRINGE (ML) INJECTION
Status: CANCELLED | OUTPATIENT
Start: 2018-12-20

## 2018-11-28 RX ORDER — HEPARIN 100 UNIT/ML
500 SYRINGE INTRAVENOUS
Status: CANCELLED | OUTPATIENT
Start: 2018-12-06

## 2018-11-28 RX ORDER — HEPARIN 100 UNIT/ML
500 SYRINGE INTRAVENOUS
Status: CANCELLED | OUTPATIENT
Start: 2018-12-20

## 2018-11-28 RX ORDER — SODIUM CHLORIDE 0.9 % (FLUSH) 0.9 %
10 SYRINGE (ML) INJECTION
Status: CANCELLED | OUTPATIENT
Start: 2018-12-13

## 2018-11-28 RX ORDER — SODIUM CHLORIDE 0.9 % (FLUSH) 0.9 %
10 SYRINGE (ML) INJECTION
Status: CANCELLED | OUTPATIENT
Start: 2018-12-06

## 2018-12-05 ENCOUNTER — OFFICE VISIT (OUTPATIENT)
Dept: INTERNAL MEDICINE | Facility: CLINIC | Age: 58
End: 2018-12-05
Payer: COMMERCIAL

## 2018-12-05 ENCOUNTER — INFUSION (OUTPATIENT)
Dept: INFUSION THERAPY | Facility: HOSPITAL | Age: 58
End: 2018-12-05
Attending: INTERNAL MEDICINE
Payer: COMMERCIAL

## 2018-12-05 ENCOUNTER — LAB VISIT (OUTPATIENT)
Dept: LAB | Facility: HOSPITAL | Age: 58
End: 2018-12-05
Attending: INTERNAL MEDICINE
Payer: COMMERCIAL

## 2018-12-05 VITALS
SYSTOLIC BLOOD PRESSURE: 118 MMHG | DIASTOLIC BLOOD PRESSURE: 72 MMHG | BODY MASS INDEX: 31.07 KG/M2 | TEMPERATURE: 99 F | HEART RATE: 103 BPM | HEIGHT: 65 IN | WEIGHT: 186.5 LBS

## 2018-12-05 VITALS — HEART RATE: 103 BPM | SYSTOLIC BLOOD PRESSURE: 130 MMHG | RESPIRATION RATE: 18 BRPM | DIASTOLIC BLOOD PRESSURE: 71 MMHG

## 2018-12-05 DIAGNOSIS — Z17.1 MALIGNANT NEOPLASM OF UPPER-OUTER QUADRANT OF LEFT BREAST IN FEMALE, ESTROGEN RECEPTOR NEGATIVE: ICD-10-CM

## 2018-12-05 DIAGNOSIS — Z51.11 ENCOUNTER FOR ANTINEOPLASTIC CHEMOTHERAPY: Primary | ICD-10-CM

## 2018-12-05 DIAGNOSIS — L30.9 DERMATITIS: ICD-10-CM

## 2018-12-05 DIAGNOSIS — C50.412 MALIGNANT NEOPLASM OF UPPER-OUTER QUADRANT OF LEFT BREAST IN FEMALE, ESTROGEN RECEPTOR NEGATIVE: ICD-10-CM

## 2018-12-05 DIAGNOSIS — C77.9 REGIONAL LYMPH NODE METASTASIS PRESENT: ICD-10-CM

## 2018-12-05 DIAGNOSIS — Z51.11 ENCOUNTER FOR ANTINEOPLASTIC CHEMOTHERAPY: ICD-10-CM

## 2018-12-05 DIAGNOSIS — F43.22 ADJUSTMENT DISORDER WITH ANXIETY: Primary | ICD-10-CM

## 2018-12-05 LAB
ALBUMIN SERPL BCP-MCNC: 4.3 G/DL
ALP SERPL-CCNC: 108 U/L
ALT SERPL W/O P-5'-P-CCNC: 18 U/L
ANION GAP SERPL CALC-SCNC: 9 MMOL/L
AST SERPL-CCNC: 26 U/L
BASOPHILS # BLD AUTO: 0.04 K/UL
BASOPHILS NFR BLD: 1.1 %
BILIRUB SERPL-MCNC: 0.4 MG/DL
BUN SERPL-MCNC: 7 MG/DL
CALCIUM SERPL-MCNC: 10.3 MG/DL
CHLORIDE SERPL-SCNC: 104 MMOL/L
CO2 SERPL-SCNC: 25 MMOL/L
CREAT SERPL-MCNC: 0.9 MG/DL
DIFFERENTIAL METHOD: ABNORMAL
EOSINOPHIL # BLD AUTO: 0 K/UL
EOSINOPHIL NFR BLD: 0.8 %
ERYTHROCYTE [DISTWIDTH] IN BLOOD BY AUTOMATED COUNT: 15.7 %
EST. GFR  (AFRICAN AMERICAN): >60 ML/MIN/1.73 M^2
EST. GFR  (NON AFRICAN AMERICAN): >60 ML/MIN/1.73 M^2
GLUCOSE SERPL-MCNC: 104 MG/DL
HCT VFR BLD AUTO: 39.9 %
HGB BLD-MCNC: 12.5 G/DL
IMM GRANULOCYTES # BLD AUTO: 0 K/UL
IMM GRANULOCYTES NFR BLD AUTO: 0 %
LYMPHOCYTES # BLD AUTO: 1.2 K/UL
LYMPHOCYTES NFR BLD: 33.6 %
MCH RBC QN AUTO: 27.1 PG
MCHC RBC AUTO-ENTMCNC: 31.3 G/DL
MCV RBC AUTO: 87 FL
MONOCYTES # BLD AUTO: 0.8 K/UL
MONOCYTES NFR BLD: 20.5 %
NEUTROPHILS # BLD AUTO: 1.6 K/UL
NEUTROPHILS NFR BLD: 44 %
NRBC BLD-RTO: 0 /100 WBC
PLATELET # BLD AUTO: 275 K/UL
PMV BLD AUTO: 9.2 FL
POTASSIUM SERPL-SCNC: 4.2 MMOL/L
PROT SERPL-MCNC: 8.3 G/DL
RBC # BLD AUTO: 4.61 M/UL
SODIUM SERPL-SCNC: 138 MMOL/L
WBC # BLD AUTO: 3.66 K/UL

## 2018-12-05 PROCEDURE — 99999 PR PBB SHADOW E&M-EST. PATIENT-LVL III: CPT | Mod: PBBFAC,,, | Performed by: INTERNAL MEDICINE

## 2018-12-05 PROCEDURE — 25000003 PHARM REV CODE 250: Performed by: INTERNAL MEDICINE

## 2018-12-05 PROCEDURE — 85025 COMPLETE CBC W/AUTO DIFF WBC: CPT

## 2018-12-05 PROCEDURE — 63600175 PHARM REV CODE 636 W HCPCS: Mod: JG | Performed by: INTERNAL MEDICINE

## 2018-12-05 PROCEDURE — 96413 CHEMO IV INFUSION 1 HR: CPT

## 2018-12-05 PROCEDURE — 96417 CHEMO IV INFUS EACH ADDL SEQ: CPT

## 2018-12-05 PROCEDURE — 99214 OFFICE O/P EST MOD 30 MIN: CPT | Mod: S$GLB,,, | Performed by: INTERNAL MEDICINE

## 2018-12-05 PROCEDURE — 3008F BODY MASS INDEX DOCD: CPT | Mod: CPTII,S$GLB,, | Performed by: INTERNAL MEDICINE

## 2018-12-05 PROCEDURE — 3078F DIAST BP <80 MM HG: CPT | Mod: CPTII,S$GLB,, | Performed by: INTERNAL MEDICINE

## 2018-12-05 PROCEDURE — 36415 COLL VENOUS BLD VENIPUNCTURE: CPT

## 2018-12-05 PROCEDURE — 80053 COMPREHEN METABOLIC PANEL: CPT

## 2018-12-05 PROCEDURE — 3074F SYST BP LT 130 MM HG: CPT | Mod: CPTII,S$GLB,, | Performed by: INTERNAL MEDICINE

## 2018-12-05 RX ORDER — TRIAMCINOLONE ACETONIDE 1 MG/G
CREAM TOPICAL 2 TIMES DAILY
Qty: 80 G | Refills: 1 | Status: ON HOLD | OUTPATIENT
Start: 2018-12-05 | End: 2019-01-28

## 2018-12-05 RX ORDER — ALPRAZOLAM 0.25 MG/1
0.25 TABLET ORAL 3 TIMES DAILY
Qty: 75 TABLET | Refills: 0 | Status: ON HOLD | OUTPATIENT
Start: 2018-12-05 | End: 2019-02-13 | Stop reason: HOSPADM

## 2018-12-05 RX ORDER — ESCITALOPRAM OXALATE 10 MG/1
10 TABLET ORAL DAILY
Qty: 30 TABLET | Refills: 2 | Status: ON HOLD | OUTPATIENT
Start: 2018-12-05 | End: 2019-02-13 | Stop reason: HOSPADM

## 2018-12-05 RX ORDER — HEPARIN 100 UNIT/ML
500 SYRINGE INTRAVENOUS
Status: DISCONTINUED | OUTPATIENT
Start: 2018-12-05 | End: 2018-12-05 | Stop reason: HOSPADM

## 2018-12-05 RX ORDER — SODIUM CHLORIDE 0.9 % (FLUSH) 0.9 %
10 SYRINGE (ML) INJECTION
Status: DISCONTINUED | OUTPATIENT
Start: 2018-12-05 | End: 2018-12-05 | Stop reason: HOSPADM

## 2018-12-05 RX ADMIN — PACLITAXEL 250 MG: 100 INJECTION, POWDER, LYOPHILIZED, FOR SUSPENSION INTRAVENOUS at 02:12

## 2018-12-05 RX ADMIN — ATEZOLIZUMAB 840 MG: 1200 INJECTION, SOLUTION INTRAVENOUS at 01:12

## 2018-12-05 RX ADMIN — SODIUM CHLORIDE: 0.9 INJECTION, SOLUTION INTRAVENOUS at 01:12

## 2018-12-05 NOTE — PROGRESS NOTES
"Subjective:       Patient ID: Ermelinda Verde is a 58 y.o. female.    Chief Complaint: Follow-up    HPI  At last visit, started on xanax bid prn. Here for f/u.   Has recurrent breast CA. S/P port placement 10/31/18.  Currently undergoing chemo.   After the first chemo treatment, pt developed a rash. Recently started using triamcinolone, which helps w/ itching.     Appetite improving. Rhinorrhea x 1 day.     Review of Systems   Constitutional: Negative for activity change and unexpected weight change.   HENT: Negative for hearing loss, rhinorrhea and trouble swallowing.    Eyes: Negative for discharge and visual disturbance.   Respiratory: Negative for chest tightness and wheezing.    Cardiovascular: Negative for chest pain and palpitations.   Gastrointestinal: Negative for blood in stool, constipation, diarrhea and vomiting.   Endocrine: Negative for polydipsia and polyuria.   Genitourinary: Negative for difficulty urinating, dysuria, hematuria and menstrual problem.   Musculoskeletal: Negative for arthralgias, joint swelling and neck pain.   Neurological: Negative for weakness and headaches.   Psychiatric/Behavioral: Positive for dysphoric mood. Negative for confusion.         Objective:      Physical Exam    /72 (BP Location: Left arm, Patient Position: Sitting, BP Method: Large (Manual))   Pulse 103   Temp 98.5 °F (36.9 °C)   Ht 5' 5" (1.651 m)   Wt 84.6 kg (186 lb 8.2 oz)   BMI 31.04 kg/m²     GEN - A+OX4, NAD   HEENT - PERRL, EOMI, OP clear  CV - RRR, no m/r   Chest - CTAB, no wheezing or rhonchi. R chest port w/o pain on palpation.   Abd - S/NT/ND/+BS.   Ext - 2+BDP and radial pulses. No LE edema.  MSK - no spinal tenderness to palpation. Normal gait.   Skin - dry skin and hyperpigmentation of knuckles and thighs.    Previous labs reviewed.    Assessment/Plan     Ermelinda was seen today for follow-up.    Diagnoses and all orders for this visit:    Adjustment disorder with anxiety  -     ALPRAZolam " (XANAX) 0.25 MG tablet; Take 1 tablet (0.25 mg total) by mouth 3 (three) times daily.  -     escitalopram oxalate (LEXAPRO) 10 MG tablet; Take 1 tablet (10 mg total) by mouth once daily.    Dermatitis  -     triamcinolone acetonide 0.1% (KENALOG) 0.1 % cream; Apply topically 2 (two) times daily. for 7 days    30 minutes was spent on patient with over half the time was spent in coordination of care and/or counseling.      Follow-up in about 8 weeks (around 1/30/2019).      Reta Weeks MD  Department of Internal Medicine - [facility]  11:05 AM

## 2018-12-11 ENCOUNTER — TELEPHONE (OUTPATIENT)
Dept: HEMATOLOGY/ONCOLOGY | Facility: CLINIC | Age: 58
End: 2018-12-11

## 2018-12-11 NOTE — TELEPHONE ENCOUNTER
Spoke with pt about continuing rash on thighs and other areas of body that comes and goes. Pt wanted to let Dr Reyna know that when she applies the cream for the rash it is kind of removing the top layer of the rash and those areas have become very sensitive on her body. Pt also wanted to let Dr Reyna know she woke up this morning with some puffiness around her eyes and was curious as to if this is normal with her chemo medications or is there something she can take (like benadryl) to help with the puffiness and rash. Informed pt would forward message to Dr Reyna to see if he has any recommendations for her. Pt verbalized understanding.       ----- Message from Joshua Maxwell sent at 12/11/2018 10:27 AM CST -----  Contact: Pt   Will like a call from staff in regards to puffiness around the eye and a rash outbreak     Contact::[phone]

## 2018-12-11 NOTE — TELEPHONE ENCOUNTER
Spoke with pt again to let her know that definitely give benadryl a try to see if it may help with both the rash and puffiness and give us a call back in a few days to update us on how she is progressing. Informed pt that if her symptoms get any worse or she starts having trouble breathing then go to the ER or call 911 to get treated. Pt verbalized understanding of this information.

## 2018-12-12 ENCOUNTER — INFUSION (OUTPATIENT)
Dept: INFUSION THERAPY | Facility: HOSPITAL | Age: 58
End: 2018-12-12
Attending: INTERNAL MEDICINE
Payer: COMMERCIAL

## 2018-12-12 ENCOUNTER — LAB VISIT (OUTPATIENT)
Dept: LAB | Facility: HOSPITAL | Age: 58
End: 2018-12-12
Attending: INTERNAL MEDICINE
Payer: COMMERCIAL

## 2018-12-12 VITALS
BODY MASS INDEX: 31.07 KG/M2 | SYSTOLIC BLOOD PRESSURE: 136 MMHG | TEMPERATURE: 98 F | WEIGHT: 186.5 LBS | HEART RATE: 89 BPM | HEIGHT: 65 IN | RESPIRATION RATE: 18 BRPM | DIASTOLIC BLOOD PRESSURE: 65 MMHG

## 2018-12-12 DIAGNOSIS — Z17.1 MALIGNANT NEOPLASM OF UPPER-OUTER QUADRANT OF LEFT BREAST IN FEMALE, ESTROGEN RECEPTOR NEGATIVE: ICD-10-CM

## 2018-12-12 DIAGNOSIS — Z51.11 ENCOUNTER FOR ANTINEOPLASTIC CHEMOTHERAPY: Primary | ICD-10-CM

## 2018-12-12 DIAGNOSIS — Z17.1 MALIGNANT NEOPLASM OF UPPER-OUTER QUADRANT OF LEFT BREAST IN FEMALE, ESTROGEN RECEPTOR NEGATIVE: Chronic | ICD-10-CM

## 2018-12-12 DIAGNOSIS — C50.412 MALIGNANT NEOPLASM OF UPPER-OUTER QUADRANT OF LEFT BREAST IN FEMALE, ESTROGEN RECEPTOR NEGATIVE: ICD-10-CM

## 2018-12-12 DIAGNOSIS — C50.412 MALIGNANT NEOPLASM OF UPPER-OUTER QUADRANT OF LEFT BREAST IN FEMALE, ESTROGEN RECEPTOR NEGATIVE: Chronic | ICD-10-CM

## 2018-12-12 DIAGNOSIS — Z51.11 ENCOUNTER FOR ANTINEOPLASTIC CHEMOTHERAPY: ICD-10-CM

## 2018-12-12 LAB
ALBUMIN SERPL BCP-MCNC: 4.1 G/DL
ALP SERPL-CCNC: 97 U/L
ALT SERPL W/O P-5'-P-CCNC: 19 U/L
ANION GAP SERPL CALC-SCNC: 8 MMOL/L
AST SERPL-CCNC: 28 U/L
BASOPHILS # BLD AUTO: 0.06 K/UL
BASOPHILS NFR BLD: 1.3 %
BILIRUB SERPL-MCNC: 0.3 MG/DL
BUN SERPL-MCNC: 7 MG/DL
CALCIUM SERPL-MCNC: 9.8 MG/DL
CHLORIDE SERPL-SCNC: 104 MMOL/L
CO2 SERPL-SCNC: 26 MMOL/L
CREAT SERPL-MCNC: 0.9 MG/DL
DIFFERENTIAL METHOD: ABNORMAL
EOSINOPHIL # BLD AUTO: 0.2 K/UL
EOSINOPHIL NFR BLD: 4 %
ERYTHROCYTE [DISTWIDTH] IN BLOOD BY AUTOMATED COUNT: 15.3 %
EST. GFR  (AFRICAN AMERICAN): >60 ML/MIN/1.73 M^2
EST. GFR  (NON AFRICAN AMERICAN): >60 ML/MIN/1.73 M^2
GLUCOSE SERPL-MCNC: 92 MG/DL
HCT VFR BLD AUTO: 37.2 %
HGB BLD-MCNC: 11.4 G/DL
IMM GRANULOCYTES # BLD AUTO: 0.02 K/UL
IMM GRANULOCYTES NFR BLD AUTO: 0.4 %
LYMPHOCYTES # BLD AUTO: 1.3 K/UL
LYMPHOCYTES NFR BLD: 28.2 %
MCH RBC QN AUTO: 26.5 PG
MCHC RBC AUTO-ENTMCNC: 30.6 G/DL
MCV RBC AUTO: 87 FL
MONOCYTES # BLD AUTO: 0.5 K/UL
MONOCYTES NFR BLD: 9.8 %
NEUTROPHILS # BLD AUTO: 2.7 K/UL
NEUTROPHILS NFR BLD: 56.3 %
NRBC BLD-RTO: 0 /100 WBC
PLATELET # BLD AUTO: 250 K/UL
PMV BLD AUTO: 9.6 FL
POTASSIUM SERPL-SCNC: 4.3 MMOL/L
PROT SERPL-MCNC: 8 G/DL
RBC # BLD AUTO: 4.3 M/UL
SODIUM SERPL-SCNC: 138 MMOL/L
WBC # BLD AUTO: 4.71 K/UL

## 2018-12-12 PROCEDURE — 36415 COLL VENOUS BLD VENIPUNCTURE: CPT

## 2018-12-12 PROCEDURE — 96413 CHEMO IV INFUSION 1 HR: CPT

## 2018-12-12 PROCEDURE — 96375 TX/PRO/DX INJ NEW DRUG ADDON: CPT

## 2018-12-12 PROCEDURE — 80053 COMPREHEN METABOLIC PANEL: CPT

## 2018-12-12 PROCEDURE — 85025 COMPLETE CBC W/AUTO DIFF WBC: CPT

## 2018-12-12 PROCEDURE — 25000003 PHARM REV CODE 250: Performed by: INTERNAL MEDICINE

## 2018-12-12 PROCEDURE — 63600175 PHARM REV CODE 636 W HCPCS: Performed by: INTERNAL MEDICINE

## 2018-12-12 RX ORDER — SODIUM CHLORIDE 0.9 % (FLUSH) 0.9 %
10 SYRINGE (ML) INJECTION
Status: DISCONTINUED | OUTPATIENT
Start: 2018-12-12 | End: 2018-12-12 | Stop reason: HOSPADM

## 2018-12-12 RX ORDER — HEPARIN 100 UNIT/ML
500 SYRINGE INTRAVENOUS
Status: DISCONTINUED | OUTPATIENT
Start: 2018-12-12 | End: 2018-12-12 | Stop reason: HOSPADM

## 2018-12-12 RX ADMIN — PACLITAXEL 250 MG: 100 INJECTION, POWDER, LYOPHILIZED, FOR SUSPENSION INTRAVENOUS at 12:12

## 2018-12-12 RX ADMIN — HYDROCORTISONE SODIUM SUCCINATE 50 MG: 100 INJECTION, POWDER, FOR SOLUTION INTRAMUSCULAR; INTRAVENOUS at 11:12

## 2018-12-12 RX ADMIN — HEPARIN 500 UNITS: 100 SYRINGE at 01:12

## 2018-12-12 RX ADMIN — SODIUM CHLORIDE: 9 INJECTION, SOLUTION INTRAVENOUS at 11:12

## 2018-12-12 NOTE — NURSING
Patient arrived for abraxane today with c/o puffiness around eyes and continued rash on thighs. States took benadryl last night with no signs of relief. Notified leatha Wynne to proceed with tx today and give 50 mg of hydrocortisone. Will implement orders and update patient with plan of care.

## 2018-12-12 NOTE — PLAN OF CARE
Problem: Patient Care Overview  Goal: Plan of Care Review  Outcome: Ongoing (interventions implemented as appropriate)  Patient tolerated abraxane well today. NAD noted upon discharge. Puffiness around eyes greatly decreased post hydrocortisone injection. Patient verbalized understanding to call MD for any questions/concerns. AVS given. Port + blood return present, flushed, hep locked and deaccessed. Discharged home, ambulated independently with  by side.

## 2018-12-15 ENCOUNTER — NURSE TRIAGE (OUTPATIENT)
Dept: ADMINISTRATIVE | Facility: CLINIC | Age: 58
End: 2018-12-15

## 2018-12-15 NOTE — TELEPHONE ENCOUNTER
"Was seen on Wednesday and was given a steroid injection for swelling or puffiness around the eyes. Was told a side effect of chemo drugs. Went down a little bit after steroid but is now coming back. Tongue feels a little thick but always has that side effect    Reason for Disposition   Taking an ACE Inhibitor medication  (e.g., benazepril/LOTENSIN, captopril/CAPOTEN, enalapril/VASOTEC, lisinopril/ZESTRIL)    Answer Assessment - Initial Assessment Questions  1. ONSET: "When did the swelling start?" (e.g., minutes, hours, days)      Started on Wed and was treated and started again this morning  2. LOCATION: "What part of the face is swollen?"      Around the eyes and right cheek and upper lip is a little swollen  3. SEVERITY: "How swollen is it?"      moderate  4. ITCHING: "Is there any itching?" If so, ask: "How much?"   (Scale 1-10; mild, moderate or severe)      no  5. PAIN: "Is the swelling painful to touch?" If so, ask: "How painful is it?"   (Scale 1-10; mild, moderate or severe)      no  6. FEVER: "Do you have a fever?" If so, ask: "What is it, how was it measured, and when did it start?"       no  7. CAUSE: "What do you think is causing the face swelling?"      Chemo meds  8. RECURRENT SYMPTOM: "Have you had face swelling before?" If so, ask: "When was the last time?" "What happened that time?"      Yes earlier in the week  9. OTHER SYMPTOMS: "Do you have any other symptoms?" (e.g., toothache, leg swelling)      no  10. PREGNANCY: "Is there any chance you are pregnant?" "When was your last menstrual period?"        no    Protocols used: ST FACE SWELLING-A-      "

## 2018-12-19 ENCOUNTER — INFUSION (OUTPATIENT)
Dept: INFUSION THERAPY | Facility: HOSPITAL | Age: 58
End: 2018-12-19
Attending: INTERNAL MEDICINE
Payer: COMMERCIAL

## 2018-12-19 ENCOUNTER — LAB VISIT (OUTPATIENT)
Dept: LAB | Facility: HOSPITAL | Age: 58
End: 2018-12-19
Attending: INTERNAL MEDICINE
Payer: COMMERCIAL

## 2018-12-19 ENCOUNTER — TELEPHONE (OUTPATIENT)
Dept: HEMATOLOGY/ONCOLOGY | Facility: CLINIC | Age: 58
End: 2018-12-19

## 2018-12-19 VITALS
HEIGHT: 65 IN | OXYGEN SATURATION: 100 % | RESPIRATION RATE: 18 BRPM | TEMPERATURE: 99 F | DIASTOLIC BLOOD PRESSURE: 72 MMHG | WEIGHT: 187.38 LBS | SYSTOLIC BLOOD PRESSURE: 132 MMHG | BODY MASS INDEX: 31.22 KG/M2 | HEART RATE: 78 BPM

## 2018-12-19 DIAGNOSIS — C50.412 MALIGNANT NEOPLASM OF UPPER-OUTER QUADRANT OF LEFT BREAST IN FEMALE, ESTROGEN RECEPTOR NEGATIVE: ICD-10-CM

## 2018-12-19 DIAGNOSIS — Z17.1 MALIGNANT NEOPLASM OF UPPER-OUTER QUADRANT OF LEFT BREAST IN FEMALE, ESTROGEN RECEPTOR NEGATIVE: Chronic | ICD-10-CM

## 2018-12-19 DIAGNOSIS — Z17.1 MALIGNANT NEOPLASM OF UPPER-OUTER QUADRANT OF LEFT BREAST IN FEMALE, ESTROGEN RECEPTOR NEGATIVE: ICD-10-CM

## 2018-12-19 DIAGNOSIS — Z51.11 ENCOUNTER FOR ANTINEOPLASTIC CHEMOTHERAPY: Primary | ICD-10-CM

## 2018-12-19 DIAGNOSIS — Z51.11 ENCOUNTER FOR ANTINEOPLASTIC CHEMOTHERAPY: ICD-10-CM

## 2018-12-19 DIAGNOSIS — C50.412 MALIGNANT NEOPLASM OF UPPER-OUTER QUADRANT OF LEFT BREAST IN FEMALE, ESTROGEN RECEPTOR NEGATIVE: Chronic | ICD-10-CM

## 2018-12-19 LAB
ALBUMIN SERPL BCP-MCNC: 3.5 G/DL
ALP SERPL-CCNC: 89 U/L
ALT SERPL W/O P-5'-P-CCNC: 21 U/L
ANION GAP SERPL CALC-SCNC: 8 MMOL/L
AST SERPL-CCNC: 42 U/L
BASOPHILS # BLD AUTO: 0.02 K/UL
BASOPHILS NFR BLD: 0.6 %
BILIRUB SERPL-MCNC: 0.3 MG/DL
BUN SERPL-MCNC: 6 MG/DL
CALCIUM SERPL-MCNC: 9.5 MG/DL
CHLORIDE SERPL-SCNC: 107 MMOL/L
CO2 SERPL-SCNC: 25 MMOL/L
CREAT SERPL-MCNC: 0.7 MG/DL
DIFFERENTIAL METHOD: ABNORMAL
EOSINOPHIL # BLD AUTO: 0.3 K/UL
EOSINOPHIL NFR BLD: 8.4 %
ERYTHROCYTE [DISTWIDTH] IN BLOOD BY AUTOMATED COUNT: 15.7 %
EST. GFR  (AFRICAN AMERICAN): >60 ML/MIN/1.73 M^2
EST. GFR  (NON AFRICAN AMERICAN): >60 ML/MIN/1.73 M^2
GLUCOSE SERPL-MCNC: 101 MG/DL
HCT VFR BLD AUTO: 33.8 %
HGB BLD-MCNC: 10.9 G/DL
IMM GRANULOCYTES # BLD AUTO: 0.02 K/UL
IMM GRANULOCYTES NFR BLD AUTO: 0.6 %
LYMPHOCYTES # BLD AUTO: 1.1 K/UL
LYMPHOCYTES NFR BLD: 30.4 %
MCH RBC QN AUTO: 27.4 PG
MCHC RBC AUTO-ENTMCNC: 32.2 G/DL
MCV RBC AUTO: 85 FL
MONOCYTES # BLD AUTO: 0.2 K/UL
MONOCYTES NFR BLD: 6.7 %
NEUTROPHILS # BLD AUTO: 1.9 K/UL
NEUTROPHILS NFR BLD: 53.3 %
NRBC BLD-RTO: 0 /100 WBC
PLATELET # BLD AUTO: 248 K/UL
PMV BLD AUTO: 9.2 FL
POTASSIUM SERPL-SCNC: 3.9 MMOL/L
PROT SERPL-MCNC: 7.3 G/DL
RBC # BLD AUTO: 3.98 M/UL
SODIUM SERPL-SCNC: 140 MMOL/L
WBC # BLD AUTO: 3.59 K/UL

## 2018-12-19 PROCEDURE — 85025 COMPLETE CBC W/AUTO DIFF WBC: CPT

## 2018-12-19 PROCEDURE — 80053 COMPREHEN METABOLIC PANEL: CPT

## 2018-12-19 PROCEDURE — 96413 CHEMO IV INFUSION 1 HR: CPT

## 2018-12-19 PROCEDURE — 96375 TX/PRO/DX INJ NEW DRUG ADDON: CPT

## 2018-12-19 PROCEDURE — 36415 COLL VENOUS BLD VENIPUNCTURE: CPT

## 2018-12-19 PROCEDURE — 25000003 PHARM REV CODE 250: Performed by: PHYSICIAN ASSISTANT

## 2018-12-19 PROCEDURE — S0028 INJECTION, FAMOTIDINE, 20 MG: HCPCS | Performed by: PHYSICIAN ASSISTANT

## 2018-12-19 PROCEDURE — 63600175 PHARM REV CODE 636 W HCPCS: Mod: JG | Performed by: INTERNAL MEDICINE

## 2018-12-19 PROCEDURE — 63600175 PHARM REV CODE 636 W HCPCS: Performed by: PHYSICIAN ASSISTANT

## 2018-12-19 PROCEDURE — 25000003 PHARM REV CODE 250: Performed by: INTERNAL MEDICINE

## 2018-12-19 RX ORDER — DIPHENHYDRAMINE HYDROCHLORIDE 50 MG/ML
25 INJECTION INTRAMUSCULAR; INTRAVENOUS
Status: CANCELLED
Start: 2018-12-20

## 2018-12-19 RX ORDER — FAMOTIDINE 10 MG/ML
20 INJECTION INTRAVENOUS
Status: COMPLETED | OUTPATIENT
Start: 2018-12-19 | End: 2018-12-19

## 2018-12-19 RX ORDER — HEPARIN 100 UNIT/ML
500 SYRINGE INTRAVENOUS
Status: DISCONTINUED | OUTPATIENT
Start: 2018-12-19 | End: 2018-12-19 | Stop reason: HOSPADM

## 2018-12-19 RX ORDER — DIPHENHYDRAMINE HYDROCHLORIDE 50 MG/ML
25 INJECTION INTRAMUSCULAR; INTRAVENOUS
Status: COMPLETED | OUTPATIENT
Start: 2018-12-19 | End: 2018-12-19

## 2018-12-19 RX ORDER — FAMOTIDINE 10 MG/ML
20 INJECTION INTRAVENOUS
Status: CANCELLED
Start: 2018-12-20 | End: 2018-12-20

## 2018-12-19 RX ORDER — SODIUM CHLORIDE 0.9 % (FLUSH) 0.9 %
10 SYRINGE (ML) INJECTION
Status: DISCONTINUED | OUTPATIENT
Start: 2018-12-19 | End: 2018-12-19 | Stop reason: HOSPADM

## 2018-12-19 RX ADMIN — SODIUM CHLORIDE: 9 INJECTION, SOLUTION INTRAVENOUS at 10:12

## 2018-12-19 RX ADMIN — HEPARIN 500 UNITS: 100 SYRINGE at 01:12

## 2018-12-19 RX ADMIN — DIPHENHYDRAMINE HYDROCHLORIDE 25 MG: 50 INJECTION INTRAMUSCULAR; INTRAVENOUS at 11:12

## 2018-12-19 RX ADMIN — FAMOTIDINE 20 MG: 10 INJECTION, SOLUTION INTRAVENOUS at 11:12

## 2018-12-19 RX ADMIN — HYDROCORTISONE SODIUM SUCCINATE 100 MG: 100 INJECTION, POWDER, FOR SOLUTION INTRAMUSCULAR; INTRAVENOUS at 11:12

## 2018-12-19 RX ADMIN — PACLITAXEL 250 MG: 100 INJECTION, POWDER, LYOPHILIZED, FOR SUSPENSION INTRAVENOUS at 01:12

## 2018-12-19 NOTE — PROGRESS NOTES
Subjective:       Patient ID: Ermelinda Verde is a 58 y.o. female.    Chief Complaint: No chief complaint on file.    HPI  Review of Systems    Objective:      Physical Exam    Assessment:       No diagnosis found.    Plan:       ***

## 2018-12-19 NOTE — TELEPHONE ENCOUNTER
Patient arrived to clinic to have her face evaluated with continued swelling noted for over one week now. Patient reports that it has not responded to the benadryl regimen. Patient has generalized facial swelling most prominent in the upper eyelids and neck region. Patient wanted to be seen before proceeding w/ tecentriq. [facility] notified and to evaluate patient.

## 2018-12-19 NOTE — PLAN OF CARE
Problem: Adult Inpatient Plan of Care  Goal: Plan of Care Review  Pt admitted to unit @ 10:20 for Tencentriq/Abraxane infusion,ambulted onto unit unassisted, accompanied by . Labs reviewed and port accessed.  Pt c/o redness and swelling to face since 12/12 after completion of last chemo infusion. She was seen by [facility] Song NP  prior to admit this am and was given Benadryl, Pepcid, and Solu-cortef IVP @ 11:18 on unit then was sent back to [facility] for evaluation. Symptoms improved, however Tecentriq was held. She tolerated abraxane tx well, no signs of allergic reaction noted. Plan of care was reviewed with Pt and Pt was instructed to contact MD with any further concerns or questions, she verbalized understanding.Pt discharged @ 13:48, AVS given to Pt and pt ambulated off unit unassisted, accompanied by

## 2018-12-20 ENCOUNTER — TELEPHONE (OUTPATIENT)
Dept: INTERNAL MEDICINE | Facility: CLINIC | Age: 58
End: 2018-12-20

## 2018-12-20 ENCOUNTER — OFFICE VISIT (OUTPATIENT)
Dept: INTERNAL MEDICINE | Facility: CLINIC | Age: 58
End: 2018-12-20
Payer: COMMERCIAL

## 2018-12-20 VITALS
WEIGHT: 185.44 LBS | DIASTOLIC BLOOD PRESSURE: 66 MMHG | HEART RATE: 101 BPM | BODY MASS INDEX: 30.89 KG/M2 | SYSTOLIC BLOOD PRESSURE: 118 MMHG | TEMPERATURE: 98 F | HEIGHT: 65 IN | OXYGEN SATURATION: 98 %

## 2018-12-20 DIAGNOSIS — J00 ACUTE NASOPHARYNGITIS: Primary | ICD-10-CM

## 2018-12-20 PROCEDURE — 3078F DIAST BP <80 MM HG: CPT | Mod: CPTII,S$GLB,, | Performed by: NURSE PRACTITIONER

## 2018-12-20 PROCEDURE — 99213 OFFICE O/P EST LOW 20 MIN: CPT | Mod: S$GLB,,, | Performed by: NURSE PRACTITIONER

## 2018-12-20 PROCEDURE — 3008F BODY MASS INDEX DOCD: CPT | Mod: CPTII,S$GLB,, | Performed by: NURSE PRACTITIONER

## 2018-12-20 PROCEDURE — 99999 PR PBB SHADOW E&M-EST. PATIENT-LVL III: CPT | Mod: PBBFAC,,, | Performed by: NURSE PRACTITIONER

## 2018-12-20 PROCEDURE — 3074F SYST BP LT 130 MM HG: CPT | Mod: CPTII,S$GLB,, | Performed by: NURSE PRACTITIONER

## 2018-12-20 NOTE — TELEPHONE ENCOUNTER
Called and spoke to pt and let her know that Dr. Weeks does not have anything today or tomorrow advised her to keep her appointment with Jin Martin

## 2018-12-20 NOTE — TELEPHONE ENCOUNTER
----- Message from Maximo Motta sent at 12/20/2018  7:29 AM CST -----  Contact: Patient [phone]  The patient requested a call back to possibly be seen today. Her symptoms are nasal congestion and irritated throat.    Thank you

## 2018-12-20 NOTE — PROGRESS NOTES
"Subjective:       Patient ID: Ermelinda Verde is a 58 y.o. female.    Chief Complaint: Sore Throat (trouble swallowing for 1 week) and Chest Congestion    HPI:  57 yo female that presents to clinic today with complaints of sore throat and nasal congestion.    States that symptoms have been going on for a couple of days.  States that throat "really isn't sore just feels a little swollen."  States that she did receive Tencentriq/Abraxane infusion yesterday for treatment of breast cancer.  States that she was given benadryl and steroid yesterday which help with symptoms but woke up this morning and "symtpoms came back."  Denies any fever, SOB, chest pain, n/v or dizziness.  States that she has not taken anything otc for symptoms.  States that appetite and energy level are good.    Review of Systems   Constitutional: Negative for activity change, appetite change, fatigue and fever.   HENT: Positive for congestion, postnasal drip and rhinorrhea. Negative for sinus pressure, sinus pain, sore throat and trouble swallowing.    Respiratory: Negative for apnea, cough, shortness of breath and wheezing.    Cardiovascular: Negative for chest pain, palpitations and leg swelling.   Gastrointestinal: Negative for abdominal distention, abdominal pain, constipation, diarrhea, nausea and vomiting.   Musculoskeletal: Negative for arthralgias, back pain, myalgias, neck pain and neck stiffness.   Skin: Negative for color change and rash.   Neurological: Negative for dizziness, light-headedness, numbness and headaches.   Psychiatric/Behavioral: Negative for behavioral problems.       Objective:      Physical Exam   Constitutional: She is oriented to person, place, and time. She appears well-developed and well-nourished. No distress.   HENT:   Head: Normocephalic and atraumatic.   Right Ear: External ear normal.   Left Ear: External ear normal.   Nose: Rhinorrhea present. Right sinus exhibits no maxillary sinus tenderness and no frontal " sinus tenderness. Left sinus exhibits no maxillary sinus tenderness and no frontal sinus tenderness.   Mouth/Throat: No oropharyngeal exudate, posterior oropharyngeal edema or posterior oropharyngeal erythema. No tonsillar exudate.   + post nasal drip and mild erythema to oropharynx   Neck: Normal range of motion. Neck supple. No thyromegaly present.   Cardiovascular: Normal rate, regular rhythm, normal heart sounds and intact distal pulses.   No murmur heard.  Pulmonary/Chest: Effort normal and breath sounds normal. No stridor. No respiratory distress. She has no wheezes.   Abdominal: Soft. Bowel sounds are normal. She exhibits no distension and no mass. There is no tenderness.   Lymphadenopathy:     She has no cervical adenopathy.   Neurological: She is alert and oriented to person, place, and time.   Skin: Skin is warm and dry. No erythema.   Psychiatric: Her behavior is normal.       Assessment:       1. Acute nasopharyngitis        Plan:       1. Acute nasopharyngitis    -Vitals are stable in clinic.  -Appears viral.  -Recommended use of otc zyrtec to help with daily symptom relief.  -Encouraged use of warm salt water gargles to help with post nasal drip.  -Encouraged to increase water intake and get plenty of rest.  -Can use normal saline rinses to help with nasal congestion.  -Advil or tylenol for any fever, aches or pains.

## 2018-12-26 ENCOUNTER — TELEPHONE (OUTPATIENT)
Dept: HEMATOLOGY/ONCOLOGY | Facility: CLINIC | Age: 58
End: 2018-12-26

## 2018-12-26 ENCOUNTER — PATIENT MESSAGE (OUTPATIENT)
Dept: HEMATOLOGY/ONCOLOGY | Facility: CLINIC | Age: 58
End: 2018-12-26

## 2018-12-26 NOTE — PROGRESS NOTES
Subjective:       Patient ID: Ermelinda Verde is a 58 y.o. female.    Chief Complaint: No chief complaint on file.    Mrs Verde is  a very pleasant 57-year-old -American female who returns for a diagnosis of left breast cancer.  She is a patient of Dr. Rose and also a patient of .    She is S/P 2 cycles of nab-paclitaxel and Atezolizumab.  She has been having a recurring rash, felt likely drug related. Atezolizumab held at last treatment.  The rash has been healing she has residual changes on both thighs, posterior neck and some healing area in the left lower neck anteriorly.  Appetite and bowel function has been good.  She has no shortness of breath.  She has had some sinus congestion and some mild facial swelling.      Onc History:  She developed a palpable abnormality in her left breast in January 2017 which she noted on self-examination.  A diagnostic mammogram on January 19 showed a greater than 1 cm nodule in the upper outer portion of left breast.  By ultrasound this was lobulated and hypoechoic measuring 1.75 x 1.51 x 1.96 cm.     On January 24, 2017 a core needle biopsy was performed which showed infiltrating ductal carcinoma, high grade.  The tumor was ER negative, TX negative, and HER-2 negative.  A follow-up ultrasound on December 6 showed 2.5 x 2.2 x 1.5 cm left breast mass.  There was no abnormality noted in the left axilla.     She underwent sentinel lymph node biopsy on February 22.  That showed 4 negative lymph nodes.     She had 4 cycles of  Wilbur-adjuvantTaxotere and Cytoxan completed  on 5/9/17.     On June 26 she underwent left mastectomy.  That revealed 2 foci of invasive high-grade carcinoma measuring 14 mm and 1.5 mm.  Margins were negative.    She completed 4 cycles of adjuvant Adriamycin on September 12, 2017.      In October, she developed some left supraclavicular lymphadenopathy which turned out to be recurrence.     A fine-needle aspirate of the lymph node was  performed on October 19th.  That showed metastatic carcinoma consistent with breast primary which was ER negative, ID negative and HER 2-negative.          Review of Systems   Psychiatric/Behavioral: The patient is nervous/anxious.        Objective:      Physical Exam   Constitutional: She is oriented to person, place, and time. She appears well-developed and well-nourished.        HENT:   Mouth/Throat: No oropharyngeal exudate.   Eyes: No scleral icterus.   Cardiovascular: Normal rate and regular rhythm.   Pulmonary/Chest: Effort normal and breath sounds normal. No respiratory distress. Right breast exhibits no mass, no nipple discharge and no skin change.       Abdominal: Soft. There is no tenderness.   Musculoskeletal:        Lymphadenopathy:     She has no cervical adenopathy.   Neurological: She is alert and oriented to person, place, and time.   Skin:   Slightly hypopigmented scaling areas in the thighs posterior neck and left lower neck/supraclavicular area  See photo   Psychiatric: She has a normal mood and affect. Her behavior is normal. Thought content normal.   Vitals reviewed.      Assessment:     PET/CT - Left-sided lower neck supraclavicular axillary and retropectoral lymph nodes have almost completely resolved.  1. Malignant neoplasm of upper-outer quadrant of left breast in female, estrogen receptor negative    2. Encounter for antineoplastic chemotherapy    3. Regional lymph node metastasis present      four skin rash likely related to immunotherapy  Plan:     will hold immunotherapy next week and recheck her again in 3 weeks to determine next therapy.    Distress Screening Results: Psychosocial Distress screening score of Distress Score: 0 noted and reviewed. No intervention indicated.

## 2018-12-26 NOTE — TELEPHONE ENCOUNTER
Spoke with Sam at [employer] to answer some questions pertaining to Ms Verde being out of work and for how long. Answered questions to caller's satisfaction and will try to find paperwork to fax in that pertains to why pt cannot return to work sooner rather than later.        ----- Message from Hilario Vuong sent at 12/26/2018  3:39 PM CST -----  Contact: Juany from ([facility])   Would like a call in reference to a patient restriction form.    Contact::[phone]

## 2018-12-27 ENCOUNTER — HOSPITAL ENCOUNTER (OUTPATIENT)
Dept: RADIOLOGY | Facility: HOSPITAL | Age: 58
Discharge: HOME | End: 2018-12-27
Attending: INTERNAL MEDICINE
Payer: COMMERCIAL

## 2018-12-27 ENCOUNTER — OFFICE VISIT (OUTPATIENT)
Dept: HEMATOLOGY/ONCOLOGY | Facility: CLINIC | Age: 58
End: 2018-12-27
Payer: COMMERCIAL

## 2018-12-27 VITALS
DIASTOLIC BLOOD PRESSURE: 85 MMHG | BODY MASS INDEX: 31 KG/M2 | HEIGHT: 65 IN | RESPIRATION RATE: 18 BRPM | WEIGHT: 186.06 LBS | OXYGEN SATURATION: 100 % | HEART RATE: 103 BPM | TEMPERATURE: 98 F | SYSTOLIC BLOOD PRESSURE: 161 MMHG

## 2018-12-27 DIAGNOSIS — C77.9 REGIONAL LYMPH NODE METASTASIS PRESENT: ICD-10-CM

## 2018-12-27 DIAGNOSIS — Z17.1 MALIGNANT NEOPLASM OF UPPER-OUTER QUADRANT OF LEFT BREAST IN FEMALE, ESTROGEN RECEPTOR NEGATIVE: Chronic | ICD-10-CM

## 2018-12-27 DIAGNOSIS — Z51.11 ENCOUNTER FOR ANTINEOPLASTIC CHEMOTHERAPY: ICD-10-CM

## 2018-12-27 DIAGNOSIS — C50.412 MALIGNANT NEOPLASM OF UPPER-OUTER QUADRANT OF LEFT BREAST IN FEMALE, ESTROGEN RECEPTOR NEGATIVE: Primary | Chronic | ICD-10-CM

## 2018-12-27 DIAGNOSIS — Z17.1 MALIGNANT NEOPLASM OF UPPER-OUTER QUADRANT OF LEFT BREAST IN FEMALE, ESTROGEN RECEPTOR NEGATIVE: Primary | Chronic | ICD-10-CM

## 2018-12-27 DIAGNOSIS — C50.412 MALIGNANT NEOPLASM OF UPPER-OUTER QUADRANT OF LEFT BREAST IN FEMALE, ESTROGEN RECEPTOR NEGATIVE: Chronic | ICD-10-CM

## 2018-12-27 LAB — POCT GLUCOSE: 100 MG/DL (ref 70–110)

## 2018-12-27 PROCEDURE — 99999 PR PBB SHADOW E&M-EST. PATIENT-LVL IV: CPT | Mod: PBBFAC,,, | Performed by: INTERNAL MEDICINE

## 2018-12-27 PROCEDURE — 3008F BODY MASS INDEX DOCD: CPT | Mod: CPTII,S$GLB,, | Performed by: INTERNAL MEDICINE

## 2018-12-27 PROCEDURE — 78815 PET IMAGE W/CT SKULL-THIGH: CPT | Mod: 26,PS,, | Performed by: RADIOLOGY

## 2018-12-27 PROCEDURE — 3077F SYST BP >= 140 MM HG: CPT | Mod: CPTII,S$GLB,, | Performed by: INTERNAL MEDICINE

## 2018-12-27 PROCEDURE — 99214 OFFICE O/P EST MOD 30 MIN: CPT | Mod: S$GLB,,, | Performed by: INTERNAL MEDICINE

## 2018-12-27 PROCEDURE — 3079F DIAST BP 80-89 MM HG: CPT | Mod: CPTII,S$GLB,, | Performed by: INTERNAL MEDICINE

## 2018-12-27 PROCEDURE — 78815 PET IMAGE W/CT SKULL-THIGH: CPT | Mod: TC

## 2018-12-27 PROCEDURE — A9552 F18 FDG: HCPCS

## 2018-12-27 RX ORDER — SODIUM CHLORIDE 0.9 % (FLUSH) 0.9 %
10 SYRINGE (ML) INJECTION
Status: CANCELLED | OUTPATIENT
Start: 2019-01-10

## 2018-12-27 RX ORDER — HEPARIN 100 UNIT/ML
500 SYRINGE INTRAVENOUS
Status: CANCELLED | OUTPATIENT
Start: 2019-01-17

## 2018-12-27 RX ORDER — SODIUM CHLORIDE 0.9 % (FLUSH) 0.9 %
10 SYRINGE (ML) INJECTION
Status: CANCELLED | OUTPATIENT
Start: 2019-01-03

## 2018-12-27 RX ORDER — HEPARIN 100 UNIT/ML
500 SYRINGE INTRAVENOUS
Status: CANCELLED | OUTPATIENT
Start: 2019-01-10

## 2018-12-27 RX ORDER — SODIUM CHLORIDE 0.9 % (FLUSH) 0.9 %
10 SYRINGE (ML) INJECTION
Status: CANCELLED | OUTPATIENT
Start: 2019-01-17

## 2018-12-27 RX ORDER — HEPARIN 100 UNIT/ML
500 SYRINGE INTRAVENOUS
Status: CANCELLED | OUTPATIENT
Start: 2019-01-03

## 2019-01-03 ENCOUNTER — TELEPHONE (OUTPATIENT)
Dept: INTERNAL MEDICINE | Facility: CLINIC | Age: 59
End: 2019-01-03

## 2019-01-03 ENCOUNTER — LAB VISIT (OUTPATIENT)
Dept: LAB | Facility: HOSPITAL | Age: 59
End: 2019-01-03
Attending: INTERNAL MEDICINE
Payer: COMMERCIAL

## 2019-01-03 ENCOUNTER — INFUSION (OUTPATIENT)
Dept: INFUSION THERAPY | Facility: HOSPITAL | Age: 59
End: 2019-01-03
Attending: INTERNAL MEDICINE
Payer: COMMERCIAL

## 2019-01-03 VITALS
SYSTOLIC BLOOD PRESSURE: 132 MMHG | RESPIRATION RATE: 18 BRPM | HEART RATE: 88 BPM | TEMPERATURE: 97 F | DIASTOLIC BLOOD PRESSURE: 84 MMHG

## 2019-01-03 DIAGNOSIS — C50.412 MALIGNANT NEOPLASM OF UPPER-OUTER QUADRANT OF LEFT BREAST IN FEMALE, ESTROGEN RECEPTOR NEGATIVE: Chronic | ICD-10-CM

## 2019-01-03 DIAGNOSIS — Z17.1 MALIGNANT NEOPLASM OF UPPER-OUTER QUADRANT OF LEFT BREAST IN FEMALE, ESTROGEN RECEPTOR NEGATIVE: Chronic | ICD-10-CM

## 2019-01-03 DIAGNOSIS — Z17.1 MALIGNANT NEOPLASM OF UPPER-OUTER QUADRANT OF LEFT BREAST IN FEMALE, ESTROGEN RECEPTOR NEGATIVE: ICD-10-CM

## 2019-01-03 DIAGNOSIS — C50.412 MALIGNANT NEOPLASM OF UPPER-OUTER QUADRANT OF LEFT BREAST IN FEMALE, ESTROGEN RECEPTOR NEGATIVE: ICD-10-CM

## 2019-01-03 DIAGNOSIS — Z51.11 ENCOUNTER FOR ANTINEOPLASTIC CHEMOTHERAPY: Primary | ICD-10-CM

## 2019-01-03 DIAGNOSIS — Z51.11 ENCOUNTER FOR ANTINEOPLASTIC CHEMOTHERAPY: ICD-10-CM

## 2019-01-03 LAB
ALBUMIN SERPL BCP-MCNC: 3.7 G/DL
ALP SERPL-CCNC: 102 U/L
ALT SERPL W/O P-5'-P-CCNC: 22 U/L
ANION GAP SERPL CALC-SCNC: 9 MMOL/L
AST SERPL-CCNC: 71 U/L
BASOPHILS # BLD AUTO: 0.05 K/UL
BASOPHILS NFR BLD: 1 %
BILIRUB SERPL-MCNC: 0.3 MG/DL
BUN SERPL-MCNC: 8 MG/DL
CALCIUM SERPL-MCNC: 9.6 MG/DL
CHLORIDE SERPL-SCNC: 103 MMOL/L
CO2 SERPL-SCNC: 26 MMOL/L
CREAT SERPL-MCNC: 0.8 MG/DL
DIFFERENTIAL METHOD: ABNORMAL
EOSINOPHIL # BLD AUTO: 0 K/UL
EOSINOPHIL NFR BLD: 0.8 %
ERYTHROCYTE [DISTWIDTH] IN BLOOD BY AUTOMATED COUNT: 16.2 %
EST. GFR  (AFRICAN AMERICAN): >60 ML/MIN/1.73 M^2
EST. GFR  (NON AFRICAN AMERICAN): >60 ML/MIN/1.73 M^2
GLUCOSE SERPL-MCNC: 95 MG/DL
HCT VFR BLD AUTO: 36.7 %
HGB BLD-MCNC: 11.4 G/DL
IMM GRANULOCYTES # BLD AUTO: 0.01 K/UL
IMM GRANULOCYTES NFR BLD AUTO: 0.2 %
LYMPHOCYTES # BLD AUTO: 0.9 K/UL
LYMPHOCYTES NFR BLD: 16.7 %
MCH RBC QN AUTO: 26.8 PG
MCHC RBC AUTO-ENTMCNC: 31.1 G/DL
MCV RBC AUTO: 86 FL
MONOCYTES # BLD AUTO: 0.9 K/UL
MONOCYTES NFR BLD: 16.7 %
NEUTROPHILS # BLD AUTO: 3.3 K/UL
NEUTROPHILS NFR BLD: 64.6 %
NRBC BLD-RTO: 0 /100 WBC
PLATELET # BLD AUTO: 247 K/UL
PMV BLD AUTO: 9.2 FL
POTASSIUM SERPL-SCNC: 4 MMOL/L
PROT SERPL-MCNC: 7.8 G/DL
RBC # BLD AUTO: 4.26 M/UL
SODIUM SERPL-SCNC: 138 MMOL/L
WBC # BLD AUTO: 5.15 K/UL

## 2019-01-03 PROCEDURE — 36415 COLL VENOUS BLD VENIPUNCTURE: CPT

## 2019-01-03 PROCEDURE — 96413 CHEMO IV INFUSION 1 HR: CPT

## 2019-01-03 PROCEDURE — 63600175 PHARM REV CODE 636 W HCPCS: Mod: JG | Performed by: INTERNAL MEDICINE

## 2019-01-03 PROCEDURE — 85025 COMPLETE CBC W/AUTO DIFF WBC: CPT

## 2019-01-03 PROCEDURE — 25000003 PHARM REV CODE 250: Performed by: INTERNAL MEDICINE

## 2019-01-03 PROCEDURE — A4216 STERILE WATER/SALINE, 10 ML: HCPCS | Performed by: INTERNAL MEDICINE

## 2019-01-03 PROCEDURE — 80053 COMPREHEN METABOLIC PANEL: CPT

## 2019-01-03 RX ORDER — HEPARIN 100 UNIT/ML
500 SYRINGE INTRAVENOUS
Status: DISCONTINUED | OUTPATIENT
Start: 2019-01-03 | End: 2019-01-03 | Stop reason: HOSPADM

## 2019-01-03 RX ORDER — SODIUM CHLORIDE 0.9 % (FLUSH) 0.9 %
10 SYRINGE (ML) INJECTION
Status: DISCONTINUED | OUTPATIENT
Start: 2019-01-03 | End: 2019-01-03 | Stop reason: HOSPADM

## 2019-01-03 RX ADMIN — PACLITAXEL 250 MG: 100 INJECTION, POWDER, LYOPHILIZED, FOR SUSPENSION INTRAVENOUS at 10:01

## 2019-01-03 RX ADMIN — Medication 10 ML: at 11:01

## 2019-01-03 RX ADMIN — HEPARIN 500 UNITS: 100 SYRINGE at 11:01

## 2019-01-03 RX ADMIN — SODIUM CHLORIDE: 0.9 INJECTION, SOLUTION INTRAVENOUS at 10:01

## 2019-01-03 NOTE — PLAN OF CARE
Problem: Adult Inpatient Plan of Care  Goal: Plan of Care Review  Outcome: Ongoing (interventions implemented as appropriate)  Pt received Abraxane; tolerated well. VSS and NAD. Pt instructed to call MD with any concerns. Pt discharged home independently.

## 2019-01-03 NOTE — TELEPHONE ENCOUNTER
----- Message from Nery Gonzales sent at 1/3/2019 11:34 AM CST -----  Contact: Patient [phone]  Caller is requesting a sooner appointment. Caller declined first available appointment listed below. Caller will not accept being placed on the wait list and is requesting a message be sent to the provider.    When is the next available appointment:  3-2019  Did you offer to schedule the next available appt and put the patient on the wait list?:  yes   What visit type:Same day  Symptoms:  Swelling in face  Patient preference of timeframe to be scheduled:  today  What is the reason the patient is requesting a sooner appointment? (insurance terminating, changing jobs):  swelling of face   Would you prefer an answer via [employer]?:  Please call  Comments:   Please call and advise  Thank you

## 2019-01-03 NOTE — TELEPHONE ENCOUNTER
Called pt and let her know that I can get her an appointment for  Tomorrow but I dont think she should wait a day and that she can go to any [facility] near her.    Pt verbalized understanding of this.

## 2019-01-04 ENCOUNTER — OFFICE VISIT (OUTPATIENT)
Dept: URGENT CARE | Facility: CLINIC | Age: 59
End: 2019-01-04
Payer: COMMERCIAL

## 2019-01-04 VITALS
BODY MASS INDEX: 30.99 KG/M2 | HEIGHT: 65 IN | TEMPERATURE: 98 F | SYSTOLIC BLOOD PRESSURE: 154 MMHG | WEIGHT: 186 LBS | OXYGEN SATURATION: 98 % | DIASTOLIC BLOOD PRESSURE: 88 MMHG | HEART RATE: 106 BPM | RESPIRATION RATE: 20 BRPM

## 2019-01-04 DIAGNOSIS — T78.40XA ALLERGIC REACTION, INITIAL ENCOUNTER: Primary | ICD-10-CM

## 2019-01-04 PROCEDURE — 3077F PR MOST RECENT SYSTOLIC BLOOD PRESSURE >= 140 MM HG: ICD-10-PCS | Mod: CPTII,S$GLB,, | Performed by: PHYSICIAN ASSISTANT

## 2019-01-04 PROCEDURE — 99214 OFFICE O/P EST MOD 30 MIN: CPT | Mod: S$GLB,,, | Performed by: PHYSICIAN ASSISTANT

## 2019-01-04 PROCEDURE — 3008F PR BODY MASS INDEX (BMI) DOCUMENTED: ICD-10-PCS | Mod: CPTII,S$GLB,, | Performed by: PHYSICIAN ASSISTANT

## 2019-01-04 PROCEDURE — 3079F DIAST BP 80-89 MM HG: CPT | Mod: CPTII,S$GLB,, | Performed by: PHYSICIAN ASSISTANT

## 2019-01-04 PROCEDURE — 3077F SYST BP >= 140 MM HG: CPT | Mod: CPTII,S$GLB,, | Performed by: PHYSICIAN ASSISTANT

## 2019-01-04 PROCEDURE — 3008F BODY MASS INDEX DOCD: CPT | Mod: CPTII,S$GLB,, | Performed by: PHYSICIAN ASSISTANT

## 2019-01-04 PROCEDURE — 3079F PR MOST RECENT DIASTOLIC BLOOD PRESSURE 80-89 MM HG: ICD-10-PCS | Mod: CPTII,S$GLB,, | Performed by: PHYSICIAN ASSISTANT

## 2019-01-04 PROCEDURE — 99214 PR OFFICE/OUTPT VISIT, EST, LEVL IV, 30-39 MIN: ICD-10-PCS | Mod: S$GLB,,, | Performed by: PHYSICIAN ASSISTANT

## 2019-01-04 RX ORDER — PREDNISONE 20 MG/1
20 TABLET ORAL 2 TIMES DAILY
Qty: 8 TABLET | Refills: 0 | Status: SHIPPED | OUTPATIENT
Start: 2019-01-04 | End: 2019-01-08

## 2019-01-04 RX ORDER — CETIRIZINE HYDROCHLORIDE 10 MG/1
10 TABLET ORAL DAILY
Qty: 10 TABLET | Refills: 0 | Status: ON HOLD | OUTPATIENT
Start: 2019-01-04 | End: 2019-02-13 | Stop reason: HOSPADM

## 2019-01-04 RX ORDER — FAMOTIDINE 40 MG/1
40 TABLET, FILM COATED ORAL NIGHTLY
Qty: 10 TABLET | Refills: 0 | Status: ON HOLD | OUTPATIENT
Start: 2019-01-04 | End: 2019-02-13 | Stop reason: HOSPADM

## 2019-01-04 NOTE — PATIENT INSTRUCTIONS
1.  Take all medications as directed. If you have been prescribed antibiotics, make sure to complete them.   2.  Rest and keep yourself/patient well hydrated. For adults, it is recommended to drink at least 8-10 glasses of water daily.   3.  For patients above 6 months of age who are not allergic to and are not on anticoagulants, you can alternate Tylenol and Motrin every 4-6 hours for fever above 100.4F and/or pain.  For patients less than 6 months of age, allergic to or intolerant to NSAIDS, have gastritis, gastric ulcers, or history of GI bleeds, are pregnant, or are on anticoagulant therapy, you can take Tylenol every 4 hours as needed for fever above 100.4F and/or pain.   4. You should schedule a follow-up appointment with your Primary Care Provider/Pediatrician for recheck in 2-3 days or as directed at this visit.   5.  If your condition fails to improve in a timely manner, you should receive another evaluation by your Primary Care Provider/Pediatrician to discuss your concerns or return to urgent care for a recheck.  If your condition worsens at any time, you should report immediately to your nearest Emergency Department for further evaluation. **You must understand that you have received Urgent Care treatment only and that you may be released before all of your medical problems are known or treated. You, the patient, are responsible to arrange for follow-up care as instructed.         General Allergic Reactions  An allergic reaction is a set of symptoms caused by an allergen. An allergen is something that causes a persons immune system to react. When a person comes in contact with an allergen, it causes the body to release chemicals. These include the chemical histamine. Histamine causes swelling and itching. It may affect the entire body. This is called a general allergic reaction. Often symptoms affect only 1 part of the body. This is called a local allergic reaction.  You are having an allergic reaction.  Almost anything can cause one. Different people are allergic to different things. It is usually something that you ate or swallowed, came into contact with by getting or putting it on your skin or clothes, or something you breathed in the air. This can be very annoying and sometimes scary.  Most of us think of allergic reactions when we have a rash or itchy skin. Symptoms can include:  · Itching of the eyes, nose, and roof of the mouth  · Runny or stuffy nose  · Watery eyes   · Sneezing or coughing   · A blocked feeling in the ear  · Red, itchy rash called hives  · Red and purple spots  · Rash, redness, welts, blisters  · Itching, burning, stinging, pain  · Dry, flaky, cracking, scaly skin  Severe symptoms include:  · Swelling of the face, lips, or other parts of the body  · Hoarse voice  · Trouble swallowing, feeling like your throat is closing  · Trouble breathing, wheezing  · Nausea, vomiting, diarrhea, stomach cramps  · Feeling faint or lightheaded, rapid heart rate  Sometimes the cause may be obvious. But there are so many things that can cause a reaction that you may not be able to figure out. The most important things to help find your allergen are:  · Remembering when it started  · What you were doing at the time or just before that  · Any activities you were involved in  · Any new products or contacts  Below are some common causes. But remember that almost anything can cause a reaction. You may not even be aware that you came into contact with one of these things:  · Dust, mold, pollen  · Plants (common ones are poison ivy and poison oak, but there are many others)   · Animals  · Foods such as shrimp, shellfish, peanuts, milk products, gluten, and eggs. Also food colorings, flavorings, and additives.  · Insect bites or stings such as bees, mosquitos, fleas, ticks  · Medicines such as penicillin, sulfa medicines, amoxicillin, aspirin, and ibuprofen. But any medicine can cause a reaction.  · Jewelry such as  nickel or gold. This can be new, or something youve worn for a while, including zippers and buttons.  · Latex such as in gloves, clothes, toys, balloons, or some tapes. Some people allergic to latex may also have problems with foods like bananas, avocados, kiwi, papaya, or chestnuts.  · Lotions, perfumes, cosmetics, soaps, shampoos, skincare products, nail products  · Chemicals or dyes in clothing, linen, , hair dyes, soaps, iodine  Many viruses and common colds can cause a rash that is not an allergic reaction. Sometimes it is hard to tell the difference between allergies, sensitivity, or an intolerance to something. This is especially true with food. Many things can cause diarrhea, vomiting, stomach cramps, and skin irritation.  Home care    The goal of treatment is to help relieve the symptoms and get you feeling better. The rash will usually fade over several days. But it can sometimes last a couple of weeks. Over the next couple of days, there may be times when it is gets a little worse, and then better again. Here are some things to do:  · If you know what you are allergic to, stay away from it. Future reactions could be worse than this one.  · Avoid tight clothing and anything that heats up your skin (hot showers or baths, direct sunlight). Heat will make itching worse.  · An ice pack will relieve local areas of intense itching and redness. To make an ice pack, put ice cubes in a plastic bag that seals at the top. Wrap it in a thin, clean towel. Dont put the ice directly on the skin because it can damage the skin.  · Oral diphenhydramine is an over-the-counter antihistamine sold at pharmacy and grocery stores. Unless a prescription antihistamine was given, diphenhydramine may be used to reduce itching if large areas of the skin are involved. It may make you sleepy. So be careful using it in the daytime or when going to school, working, or driving. Note: Dont use diphenhydramine if you have glaucoma  or if you are a man with trouble urinating due to an enlarged prostate. There are other antihistamines that wont make you so sleepy. These are good choices for daytime use. Ask your pharmacist for suggestions.  · Dont use diphenhydramine cream on your skin. It can cause a further reaction in some people.  · To help prevent an infection, don't scratch the affected area. Scratching may worsen the reaction and damage your skin. It can also lead to an infection. Always check the affected for signs of an infection.  · Call your healthcare provider and ask what you can use to help decrease the itching.  · To decrease allergic reactions, try the following:    · Use heat-steam to clean your home  · Use high-efficiency particulate (HEPA) vacuums and filters  · Stay away from food and pet triggers  · Kill any cockroaches  · Clean your house often  Follow-up care  Follow up with your healthcare provider, or as advised. If you had a severe reaction today, or if you have had several mild to medium allergic reactions in the past, ask your provider about allergy testing. This can help you find out what you are allergic to. If your reaction included dizziness, fainting, or trouble breathing or swallowing, ask your provider about carrying auto-injectable epinephrine.  Call 911  Call 911 if any of these occur:  · Trouble breathing or swallowing, wheezing  · Cool, moist, pale skin  · Shortness of breath  · Hoarse voice or trouble speaking  · Confused   · Very drowsy or trouble awakening  · Fainting or loss of consciousness  · Rapid heart rate  · Feeling of dizziness or weakness or a sudden drop in blood pressure  · Feeling of doom  · Feeling lightheaded  · Severe nausea or vomiting, or diarrhea  · Seizure  · Swelling in the face, eyelids, lips, mouth, throat or tongue  · Drooling  When to seek medical advice  Call your healthcare provider right away if any of these occur:  · Spreading areas of itching, redness or swelling  · Nausea  or stomach cramps or abdominal pain  · Continuing or recurring symptoms  · Spreading areas of redness, swelling, or itching  · Signs of infection at the affected site:  ¨ Spreading redness  ¨ Increased pain or swelling  ¨ Fluid or colored drainage from the site  ¨ Fever of 100.4°F (38°C) or above lasting for 24 to 48 hours, or as directed by your provider  Date Last Reviewed: 3/1/2017  © 2426-7417 [employer]. [address] [place] [address], [place] [address]. All rights reserved. This information is not intended as a substitute for professional medical care. Always follow your healthcare professional's instructions.

## 2019-01-04 NOTE — PROGRESS NOTES
"Subjective:       Patient ID: Ermelinda Verde is a 58 y.o. female.    Vitals:  height is 5' 5" (1.651 m) and weight is 84.4 kg (186 lb). Her oral temperature is 98.2 °F (36.8 °C). Her blood pressure is 154/88 (abnormal) and her pulse is 106. Her respiration is 20 and oxygen saturation is 98%.     Chief Complaint: Facial Swelling    58-year-old female presents to clinic today with complaints of having an allergic reaction.  Patient states that she has redness, itching, and swelling to her face.  She states that these symptoms started after receiving her chemo infusion yesterday.  Patient states that she has an allergic reaction to the chemo each time she receives it.  She states that she has been pretreated with Benadryl and prednisone prior to receiving her previous infusions without difficulty but did not receive these medications yesterday.  Patient denies any difficulty breathing, swallowing, or talking.  She reports taking Benadryl with slight improvement in her symptoms.  Patient denies any other complaints at this time.      Edema   This is a new problem. The current episode started yesterday. The problem occurs constantly. The problem has been gradually worsening. Pertinent negatives include no arthralgias, chest pain, chills, congestion, coughing, fatigue, fever, headaches, joint swelling, myalgias, nausea, rash, sore throat, vertigo, vomiting or weakness. Nothing aggravates the symptoms. Treatments tried: Benadryl. The treatment provided mild relief.       Constitution: Negative for chills, fatigue and fever.   HENT: Negative for congestion and sore throat.    Neck: Negative for painful lymph nodes.   Cardiovascular: Negative for chest pain and leg swelling.   Eyes: Positive for eyelid swelling. Negative for double vision and blurred vision.   Respiratory: Negative for cough and shortness of breath.    Gastrointestinal: Negative for nausea, vomiting and diarrhea.   Genitourinary: Negative for dysuria, " frequency, urgency and history of kidney stones.   Musculoskeletal: Negative for joint pain, joint swelling, muscle cramps and muscle ache.   Skin: Negative for color change, pale, rash and bruising.        Facial swelling     Allergic/Immunologic: Negative for seasonal allergies.   Neurological: Negative for dizziness, history of vertigo, light-headedness, passing out and headaches.   Hematologic/Lymphatic: Negative for swollen lymph nodes.   Psychiatric/Behavioral: Negative for nervous/anxious, sleep disturbance and depression. The patient is not nervous/anxious.        Objective:      Physical Exam   Constitutional: She is oriented to person, place, and time. She appears well-developed and well-nourished. She is cooperative.  Non-toxic appearance. She does not appear ill. No distress.   HENT:   Head: Normocephalic and atraumatic.       Right Ear: Hearing, tympanic membrane, external ear and ear canal normal.   Left Ear: Hearing, tympanic membrane, external ear and ear canal normal.   Nose: Nose normal. No mucosal edema, rhinorrhea or nasal deformity. No epistaxis. Right sinus exhibits no maxillary sinus tenderness and no frontal sinus tenderness. Left sinus exhibits no maxillary sinus tenderness and no frontal sinus tenderness.   Mouth/Throat: Uvula is midline, oropharynx is clear and moist and mucous membranes are normal. No trismus in the jaw. Normal dentition. No uvula swelling. No posterior oropharyngeal erythema.   Eyes: Conjunctivae and lids are normal. Right eye exhibits no discharge. Left eye exhibits no discharge. No scleral icterus.   Sclera clear bilat   Neck: Trachea normal, normal range of motion, full passive range of motion without pain and phonation normal. Neck supple.   Cardiovascular: Normal rate, regular rhythm, normal heart sounds, intact distal pulses and normal pulses.   Pulmonary/Chest: Effort normal and breath sounds normal. No respiratory distress.   Abdominal: Soft. Normal appearance  and bowel sounds are normal. She exhibits no distension, no pulsatile midline mass and no mass. There is no tenderness.   Musculoskeletal: Normal range of motion. She exhibits no edema or deformity.   Neurological: She is alert and oriented to person, place, and time. She exhibits normal muscle tone. Coordination normal.   Skin: Skin is warm, dry and intact. She is not diaphoretic. No pallor.   Psychiatric: She has a normal mood and affect. Her speech is normal and behavior is normal. Judgment and thought content normal. Cognition and memory are normal.   Nursing note and vitals reviewed.      Assessment:       1. Allergic reaction, initial encounter        Plan:         Allergic reaction, initial encounter  -     predniSONE (DELTASONE) 20 MG tablet; Take 1 tablet (20 mg total) by mouth 2 (two) times daily. for 4 days  Dispense: 8 tablet; Refill: 0  -     cetirizine (ZYRTEC) 10 MG tablet; Take 1 tablet (10 mg total) by mouth once daily. for 10 days  Dispense: 10 tablet; Refill: 0  -     famotidine (PEPCID) 40 MG tablet; Take 1 tablet (40 mg total) by mouth every evening. for 10 days  Dispense: 10 tablet; Refill: 0     Patient appears to be having her usual allergic reaction to chemo.  Will treat symptomatically.  Patient instructed to report immediately to the ED for any new or worsening symptoms.  Patient agrees and verbalizes understanding.      Patient Instructions   1.  Take all medications as directed. If you have been prescribed antibiotics, make sure to complete them.   2.  Rest and keep yourself/patient well hydrated. For adults, it is recommended to drink at least 8-10 glasses of water daily.   3.  For patients above 6 months of age who are not allergic to and are not on anticoagulants, you can alternate Tylenol and Motrin every 4-6 hours for fever above 100.4F and/or pain.  For patients less than 6 months of age, allergic to or intolerant to NSAIDS, have gastritis, gastric ulcers, or history of GI bleeds,  are pregnant, or are on anticoagulant therapy, you can take Tylenol every 4 hours as needed for fever above 100.4F and/or pain.   4. You should schedule a follow-up appointment with your Primary Care Provider/Pediatrician for recheck in 2-3 days or as directed at this visit.   5.  If your condition fails to improve in a timely manner, you should receive another evaluation by your Primary Care Provider/Pediatrician to discuss your concerns or return to urgent care for a recheck.  If your condition worsens at any time, you should report immediately to your nearest Emergency Department for further evaluation. **You must understand that you have received Urgent Care treatment only and that you may be released before all of your medical problems are known or treated. You, the patient, are responsible to arrange for follow-up care as instructed.         General Allergic Reactions  An allergic reaction is a set of symptoms caused by an allergen. An allergen is something that causes a persons immune system to react. When a person comes in contact with an allergen, it causes the body to release chemicals. These include the chemical histamine. Histamine causes swelling and itching. It may affect the entire body. This is called a general allergic reaction. Often symptoms affect only 1 part of the body. This is called a local allergic reaction.  You are having an allergic reaction. Almost anything can cause one. Different people are allergic to different things. It is usually something that you ate or swallowed, came into contact with by getting or putting it on your skin or clothes, or something you breathed in the air. This can be very annoying and sometimes scary.  Most of us think of allergic reactions when we have a rash or itchy skin. Symptoms can include:  · Itching of the eyes, nose, and roof of the mouth  · Runny or stuffy nose  · Watery eyes   · Sneezing or coughing   · A blocked feeling in the ear  · Red, itchy rash  called hives  · Red and purple spots  · Rash, redness, welts, blisters  · Itching, burning, stinging, pain  · Dry, flaky, cracking, scaly skin  Severe symptoms include:  · Swelling of the face, lips, or other parts of the body  · Hoarse voice  · Trouble swallowing, feeling like your throat is closing  · Trouble breathing, wheezing  · Nausea, vomiting, diarrhea, stomach cramps  · Feeling faint or lightheaded, rapid heart rate  Sometimes the cause may be obvious. But there are so many things that can cause a reaction that you may not be able to figure out. The most important things to help find your allergen are:  · Remembering when it started  · What you were doing at the time or just before that  · Any activities you were involved in  · Any new products or contacts  Below are some common causes. But remember that almost anything can cause a reaction. You may not even be aware that you came into contact with one of these things:  · Dust, mold, pollen  · Plants (common ones are poison ivy and poison oak, but there are many others)   · Animals  · Foods such as shrimp, shellfish, peanuts, milk products, gluten, and eggs. Also food colorings, flavorings, and additives.  · Insect bites or stings such as bees, mosquitos, fleas, ticks  · Medicines such as penicillin, sulfa medicines, amoxicillin, aspirin, and ibuprofen. But any medicine can cause a reaction.  · Jewelry such as nickel or gold. This can be new, or something youve worn for a while, including zippers and buttons.  · Latex such as in gloves, clothes, toys, balloons, or some tapes. Some people allergic to latex may also have problems with foods like bananas, avocados, kiwi, papaya, or chestnuts.  · Lotions, perfumes, cosmetics, soaps, shampoos, skincare products, nail products  · Chemicals or dyes in clothing, linen, , hair dyes, soaps, iodine  Many viruses and common colds can cause a rash that is not an allergic reaction. Sometimes it is hard to tell  the difference between allergies, sensitivity, or an intolerance to something. This is especially true with food. Many things can cause diarrhea, vomiting, stomach cramps, and skin irritation.  Home care    The goal of treatment is to help relieve the symptoms and get you feeling better. The rash will usually fade over several days. But it can sometimes last a couple of weeks. Over the next couple of days, there may be times when it is gets a little worse, and then better again. Here are some things to do:  · If you know what you are allergic to, stay away from it. Future reactions could be worse than this one.  · Avoid tight clothing and anything that heats up your skin (hot showers or baths, direct sunlight). Heat will make itching worse.  · An ice pack will relieve local areas of intense itching and redness. To make an ice pack, put ice cubes in a plastic bag that seals at the top. Wrap it in a thin, clean towel. Dont put the ice directly on the skin because it can damage the skin.  · Oral diphenhydramine is an over-the-counter antihistamine sold at pharmacy and grocery stores. Unless a prescription antihistamine was given, diphenhydramine may be used to reduce itching if large areas of the skin are involved. It may make you sleepy. So be careful using it in the daytime or when going to school, working, or driving. Note: Dont use diphenhydramine if you have glaucoma or if you are a man with trouble urinating due to an enlarged prostate. There are other antihistamines that wont make you so sleepy. These are good choices for daytime use. Ask your pharmacist for suggestions.  · Dont use diphenhydramine cream on your skin. It can cause a further reaction in some people.  · To help prevent an infection, don't scratch the affected area. Scratching may worsen the reaction and damage your skin. It can also lead to an infection. Always check the affected for signs of an infection.  · Call your healthcare provider and  ask what you can use to help decrease the itching.  · To decrease allergic reactions, try the following:    · Use heat-steam to clean your home  · Use high-efficiency particulate (HEPA) vacuums and filters  · Stay away from food and pet triggers  · Kill any cockroaches  · Clean your house often  Follow-up care  Follow up with your healthcare provider, or as advised. If you had a severe reaction today, or if you have had several mild to medium allergic reactions in the past, ask your provider about allergy testing. This can help you find out what you are allergic to. If your reaction included dizziness, fainting, or trouble breathing or swallowing, ask your provider about carrying auto-injectable epinephrine.  Call 911  Call 911 if any of these occur:  · Trouble breathing or swallowing, wheezing  · Cool, moist, pale skin  · Shortness of breath  · Hoarse voice or trouble speaking  · Confused   · Very drowsy or trouble awakening  · Fainting or loss of consciousness  · Rapid heart rate  · Feeling of dizziness or weakness or a sudden drop in blood pressure  · Feeling of doom  · Feeling lightheaded  · Severe nausea or vomiting, or diarrhea  · Seizure  · Swelling in the face, eyelids, lips, mouth, throat or tongue  · Drooling  When to seek medical advice  Call your healthcare provider right away if any of these occur:  · Spreading areas of itching, redness or swelling  · Nausea or stomach cramps or abdominal pain  · Continuing or recurring symptoms  · Spreading areas of redness, swelling, or itching  · Signs of infection at the affected site:  ¨ Spreading redness  ¨ Increased pain or swelling  ¨ Fluid or colored drainage from the site  ¨ Fever of 100.4°F (38°C) or above lasting for 24 to 48 hours, or as directed by your provider  Date Last Reviewed: 3/1/2017  © 3594-3990 [employer]. [address] [place] [address], [place] [address]. All rights reserved. This information is not intended as a substitute for  professional medical care. Always follow your healthcare professional's instructions.

## 2019-01-07 ENCOUNTER — HOSPITAL ENCOUNTER (EMERGENCY)
Facility: HOSPITAL | Age: 59
Discharge: HOME | End: 2019-01-07
Attending: EMERGENCY MEDICINE
Payer: COMMERCIAL

## 2019-01-07 ENCOUNTER — PATIENT MESSAGE (OUTPATIENT)
Dept: HEMATOLOGY/ONCOLOGY | Facility: CLINIC | Age: 59
End: 2019-01-07

## 2019-01-07 ENCOUNTER — TELEPHONE (OUTPATIENT)
Dept: HEMATOLOGY/ONCOLOGY | Facility: CLINIC | Age: 59
End: 2019-01-07

## 2019-01-07 VITALS
HEART RATE: 102 BPM | HEIGHT: 65 IN | SYSTOLIC BLOOD PRESSURE: 164 MMHG | RESPIRATION RATE: 19 BRPM | OXYGEN SATURATION: 100 % | WEIGHT: 186 LBS | DIASTOLIC BLOOD PRESSURE: 74 MMHG | BODY MASS INDEX: 30.99 KG/M2 | TEMPERATURE: 98 F

## 2019-01-07 DIAGNOSIS — T50.905A MEDICATION REACTION, INITIAL ENCOUNTER: Primary | ICD-10-CM

## 2019-01-07 LAB
ALBUMIN SERPL BCP-MCNC: 3.6 G/DL
ALP SERPL-CCNC: 102 U/L
ALT SERPL W/O P-5'-P-CCNC: 73 U/L
ANION GAP SERPL CALC-SCNC: 7 MMOL/L
AST SERPL-CCNC: 265 U/L
BASOPHILS # BLD AUTO: 0.03 K/UL
BASOPHILS NFR BLD: 0.6 %
BILIRUB SERPL-MCNC: 0.5 MG/DL
BUN SERPL-MCNC: 10 MG/DL
CALCIUM SERPL-MCNC: 9.7 MG/DL
CHLORIDE SERPL-SCNC: 101 MMOL/L
CO2 SERPL-SCNC: 27 MMOL/L
CREAT SERPL-MCNC: 0.7 MG/DL
DIFFERENTIAL METHOD: ABNORMAL
EOSINOPHIL # BLD AUTO: 0 K/UL
EOSINOPHIL NFR BLD: 0.4 %
ERYTHROCYTE [DISTWIDTH] IN BLOOD BY AUTOMATED COUNT: 15.9 %
EST. GFR  (AFRICAN AMERICAN): >60 ML/MIN/1.73 M^2
EST. GFR  (NON AFRICAN AMERICAN): >60 ML/MIN/1.73 M^2
GLUCOSE SERPL-MCNC: 103 MG/DL
HCT VFR BLD AUTO: 38.2 %
HGB BLD-MCNC: 12.1 G/DL
IMM GRANULOCYTES # BLD AUTO: 0.01 K/UL
IMM GRANULOCYTES NFR BLD AUTO: 0.2 %
LYMPHOCYTES # BLD AUTO: 0.8 K/UL
LYMPHOCYTES NFR BLD: 15.1 %
MCH RBC QN AUTO: 27 PG
MCHC RBC AUTO-ENTMCNC: 31.7 G/DL
MCV RBC AUTO: 85 FL
MONOCYTES # BLD AUTO: 0.2 K/UL
MONOCYTES NFR BLD: 4.4 %
NEUTROPHILS # BLD AUTO: 4 K/UL
NEUTROPHILS NFR BLD: 79.3 %
NRBC BLD-RTO: 0 /100 WBC
PLATELET # BLD AUTO: 233 K/UL
PMV BLD AUTO: 9.7 FL
POTASSIUM SERPL-SCNC: 4.1 MMOL/L
PROT SERPL-MCNC: 7.8 G/DL
RBC # BLD AUTO: 4.48 M/UL
SODIUM SERPL-SCNC: 135 MMOL/L
WBC # BLD AUTO: 5.02 K/UL

## 2019-01-07 PROCEDURE — 99284 EMERGENCY DEPT VISIT MOD MDM: CPT | Mod: ,,, | Performed by: EMERGENCY MEDICINE

## 2019-01-07 PROCEDURE — 96374 THER/PROPH/DIAG INJ IV PUSH: CPT

## 2019-01-07 PROCEDURE — 80053 COMPREHEN METABOLIC PANEL: CPT

## 2019-01-07 PROCEDURE — 99284 PR EMERGENCY DEPT VISIT,LEVEL IV: ICD-10-PCS | Mod: ,,, | Performed by: EMERGENCY MEDICINE

## 2019-01-07 PROCEDURE — 99284 EMERGENCY DEPT VISIT MOD MDM: CPT | Mod: 25

## 2019-01-07 PROCEDURE — 85025 COMPLETE CBC W/AUTO DIFF WBC: CPT

## 2019-01-07 PROCEDURE — 63600175 PHARM REV CODE 636 W HCPCS: Performed by: EMERGENCY MEDICINE

## 2019-01-07 RX ORDER — METHYLPREDNISOLONE SOD SUCC 125 MG
125 VIAL (EA) INJECTION
Status: COMPLETED | OUTPATIENT
Start: 2019-01-07 | End: 2019-01-07

## 2019-01-07 RX ORDER — NYSTATIN 100000 [USP'U]/ML
500000 SUSPENSION ORAL 4 TIMES DAILY
Qty: 473 ML | Refills: 0 | Status: SHIPPED | OUTPATIENT
Start: 2019-01-07 | End: 2019-01-15 | Stop reason: SDUPTHER

## 2019-01-07 RX ADMIN — METHYLPREDNISOLONE SODIUM SUCCINATE 125 MG: 125 INJECTION, POWDER, FOR SOLUTION INTRAMUSCULAR; INTRAVENOUS at 02:01

## 2019-01-07 NOTE — CONSULTS
Consult Note    Consults  SUBJECTIVE:     History of Present Illness:  Mrs Verde is  a very pleasant 57-year-old -American female with metastatic triple negative breast cancer (on nab-paclitaxel and Atezolizumab, last dose 1/3 with only nab-paclitaxel given) who presented to the ER with facial swelling.     The swelling started  after her 3rd cycle of nab-paclitaxel which was on 1/3/19. It was diffuse facial swelling with erythema, in addition to voice hoarseness. Also endorses some odynophagia. Denies any vision changes, headache, respiratory issues, dyspnea, fevers/chills, nausea/vomiting. She went to urgent care on  and was prescribed prednisone 20mg BID, she only took 2 doses, and stopped d/t feeling jittery. She has been taking benadryl at bedtime. She also had a facial rash following first cycle of nab-paclitaxel and Atezolizumab, thought to be drug related. She states she was given IV steroids prior to second treatment and did not have issues. For her 3rd cycle, the Atezolizumab was held due to concern for association with rash.    Review of patient's allergies indicates:  No Known Allergies  Past Medical History:   Diagnosis Date    Breast cancer 2017    left    Depression     Diverticulosis     Gastritis     Hypertension     Vitamin B12 deficiency 3/8/2018     Past Surgical History:   Procedure Laterality Date    BIOPSY-SENTINEL NODE Left 2017    [facility] OR    BREAST BIOPSY Left     BREAST RECONSTRUCTION Left 2017     SECTION      COLONOSCOPY  2011    repeat in 10 yrs.    D&C      BZPESZYFY-KKCX-U-CATH Right 10/31/2018    Performed by Deo Palencia MD at [facility] OR 2ND FLR    GFVKQVYWO-VIJI-Q-CATH Right 2017    [facility]    RWRWCAIBK-XAQN-G-CATH-neck or chest Fluoro needed Consent AM of surgery Right 10/30/2018    Performed by [facility] MD at [facility] OR 2ND FLR    MASTECTOMY  Left 06/26/2017    MASTECTOMY Left 6/26/2017    Performed by Regina Rose [facility] at [facility] OR    MYOMECTOMY      PORTACATH PLACEMENT      RECONSTRUCTION-BREAST/FLAP-SCOTT Left 6/26/2017    Performed by [facility]ukhjinder Sewell MD at [facility] OR    SENTINEL LYMPH NODE BIOPSY  02/2017    left    TOTAL REDUCTION MAMMOPLASTY Right 2017     Family History   Problem Relation Age of Onset    Heart disease Father     Hypertension Father     Breast cancer Sister 52    Hypertension Mother     No Known Problems Brother     Thyroid disease Daughter         hyperthyroidism s/p thyroidectomy    No Known Problems Son     No Known Problems Brother     No Known Problems Brother     No Known Problems Sister     No Known Problems Daughter     Colon cancer Neg Hx     Ovarian cancer Neg Hx      Social History     Tobacco Use    Smoking status: Never Smoker    Smokeless tobacco: Never Used   Substance Use Topics    Alcohol use: Yes     Comment: wine    Drug use: No     Review of Systems   Constitutional: Positive for malaise/fatigue. Negative for chills and fever.   HENT: Negative for nosebleeds and sore throat.    Eyes: Negative for blurred vision, double vision, photophobia, pain, discharge and redness.   Respiratory: Negative for cough, hemoptysis and shortness of breath.    Cardiovascular: Negative for chest pain and leg swelling.   Gastrointestinal: Negative for abdominal pain, blood in stool, melena, nausea and vomiting.   Genitourinary: Negative for hematuria.   Musculoskeletal: Negative for joint pain and myalgias.   Skin: Negative for rash.        Facial swelling  Skin peeling  Skin erythema - facial, upper back lower b/l shins   Neurological: Positive for speech change. Negative for dizziness, sensory change and headaches.   Endo/Heme/Allergies: Does not bruise/bleed easily.     OBJECTIVE:     Vital Signs:  Temp:  [99 °F (37.2 °C)]   Pulse:  [100-110]   Resp:  [17-18]   BP: (137-162)/(84-90)   SpO2:  [100 %]     Physical  Exam   Constitutional: She is oriented to person, place, and time. She appears well-developed and well-nourished.   HENT:   Mouth/Throat: Oropharyngeal exudate (thrush) present.   Eyes: EOM are normal. Right eye exhibits no discharge. Left eye exhibits no discharge. No scleral icterus.   Facial swelling/erythema noted (picture in Media)   Neck: Normal range of motion. Neck supple.   Cardiovascular: Normal rate and regular rhythm.   Pulmonary/Chest: Effort normal. No respiratory distress.   Abdominal: Soft. She exhibits no distension. There is no tenderness.   Musculoskeletal: She exhibits no edema.   Lymphadenopathy:     She has cervical adenopathy.   Neurological: She is alert and oriented to person, place, and time.   Skin: Skin is warm and dry. There is erythema (upper back, b/l lower shins, facial).   Psychiatric: She has a normal mood and affect. Her behavior is normal.     Laboratory:  CBC:   Recent Labs   Lab 01/07/19  1218   WBC 5.02   RBC 4.48   HGB 12.1   HCT 38.2      MCV 85   MCH 27.0   MCHC 31.7*     CMP:   Recent Labs   Lab 01/07/19  1218      CALCIUM 9.7   ALBUMIN 3.6   PROT 7.8   *   K 4.1   CO2 27      BUN 10   CREATININE 0.7   ALKPHOS 102   ALT 73*   *   BILITOT 0.5         ASSESSMENT/PLAN:   IMPRESSION  1) Facial swelling/erythema likely drug reaction, possibly related to nab-paclitaxel  -She also had a facial rash following first cycle of nab-paclitaxel and Atezolizumab, thought to be drug related. She states she was given IV steroids prior to second treatment and did not have issues. For her 3rd cycle, the Atezolizumab was held due to concern for association with rash, however same rash/swelling started the day after nab-paclitaxel alone   -Photos under Media tab  2) Oral thrush  3) Metastatic triple negative breast cancer    RECOMMENDATIONS  -Patient to get IV 125mg solumedrol dose in ER now  -Continue prednisone 20mg BID until follow with Dr. Reyna  tomorrow  -Patient to continue taking benadryl at bedtime  -Nystatin swish and swallow for oral thrush/odynophagia  -Patient instructed to return to ER if any respiratory symptoms develop  -Notified Dr. Reyna of admission/plan    The following was discussed with supervising physician Dr. Milner. Please call [facility] number [phone] with questions, otherwise page the Oncology fellow on call.     Abigail Isaac MD  Hematology/Oncology Fellow

## 2019-01-07 NOTE — ED PROVIDER NOTES
"Encounter Date: 1/7/2019    SCRIBE #1 NOTE: I, Maura Rowland, am scribing for, and in the presence of,  Dr. Elizabeth. I have scribed the entire note.       History     Chief Complaint   Patient presents with    Facial Swelling     facial swelling , started after chemo on thurs     Time patient was seen by the provider: 11:36 AM    This is a 58 y.o. female with PMHx of Breast cancer (1/2017) who presents with chief complaint of facial swelling with an onset of 3 days ago. She reports her swelling is located to the forehead, eyelids, cheeks, lip, and tongue. Per , the patient has had increase redness to the eyes and cheeks. The patient also reports of change in voice and difficulty swallowing. Per , the swelling worsens at night, but "when she gets up and walks around the swelling goes down a little, but never goes away." She denies any eye drainage, ear drainage, itching, runny nose, fever, chills, sore throat, wheezing, croop cough, chest pain, chest tightness, N/V/D, dysuria, hematuria, bloody nose, or rash. The patient reports calling her chemotherapy nurse who advised to the patient to report to ED or Urgent Care. The patient reports visiting Urgent Care 3 days ago who prescribed 20 mg Prednisone and Benadryl in the evenings. She reports only taking 2 doses of Prednisone before ceasing due to worsening symptoms. She states 2 days ago she began feeling jittery, dizzy, and having an unsteady gait which prompted the patient to stop taking Prednisone. The patient states her last IV infusion chemotherapy treatment was 1/3/19 with Dr. Callahan. She denies taking any other the counter medications.       The history is provided by the patient.     Review of patient's allergies indicates:  No Known Allergies  Past Medical History:   Diagnosis Date    Breast cancer 01/01/2017    left    Depression     Diverticulosis     Gastritis     Hypertension     Vitamin B12 deficiency 3/8/2018     Past Surgical History: "   Procedure Laterality Date    BIOPSY-SENTINEL NODE Left 2017    Performed by [facility] at [facility] OR    BREAST BIOPSY Left     BREAST RECONSTRUCTION Left 2017     SECTION      COLONOSCOPY  2011    repeat in 10 yrs.    D&C      JOVRNWJLH-MCGE-B-CATH Right 10/31/2018    Performed by [facility] at [facility] OR 2ND FLR    JINPLZYEB-OOFI-P-CATH Right 2017    Performed by [facility] MD at [facility] OR    XULPHXALT-JIWA-V-CATH-neck or chest Fluoro needed Consent AM of surgery Right 10/30/2018    Performed by [facility] at [facility] OR 2ND FLR    MASTECTOMY Left 2017    MASTECTOMY Left 2017    Performed by [facility] at [facility] OR    MYOMECTOMY      PORTACATH PLACEMENT      RECONSTRUCTION-BREAST/FLAP-SCOTT Left 2017    Performed by [facility]ell [facility] at [facility] OR    SENTINEL LYMPH NODE BIOPSY  2017    left    TOTAL REDUCTION MAMMOPLASTY Right 2017     Family History   Problem Relation Age of Onset    Heart disease Father     Hypertension Father     Breast cancer Sister 52    Hypertension Mother     No Known Problems Brother     Thyroid disease Daughter         hyperthyroidism s/p thyroidectomy    No Known Problems Son     No Known Problems Brother     No Known Problems Brother     No Known Problems Sister     No Known Problems Daughter     Colon cancer Neg Hx     Ovarian cancer Neg Hx      Social History     Tobacco Use    Smoking status: Never Smoker    Smokeless tobacco: Never Used   Substance Use Topics    Alcohol use: Yes     Comment: wine    Drug use: No     Review of Systems   Constitutional: Negative for chills and fever.   HENT: Positive for facial swelling, trouble swallowing and voice change. Negative for ear discharge, nosebleeds, rhinorrhea and sore throat.    Eyes: Positive for redness. Negative for discharge and itching.   Respiratory: Negative for cough, chest tightness, shortness of breath and wheezing.     Cardiovascular: Negative for chest pain.   Gastrointestinal: Negative for constipation, diarrhea, nausea and vomiting.   Genitourinary: Negative for dysuria and hematuria.   Musculoskeletal: Negative for back pain.   Skin: Negative for rash.   Neurological: Negative for dizziness.   Psychiatric/Behavioral: Negative for confusion.       Physical Exam     Initial Vitals [01/07/19 1054]   BP Pulse Resp Temp SpO2   137/87 108 18 99 °F (37.2 °C) 100 %      MAP       --         Physical Exam    Constitutional: She is cooperative. She does not appear ill.   HENT:   Head: Normocephalic and atraumatic.   Mouth/Throat: Oropharynx is clear and moist.   Eyes: Conjunctivae are normal.   Neck: Normal range of motion. Neck supple. No stridor present.   Cardiovascular: Normal rate and regular rhythm.   Pulmonary/Chest: No accessory muscle usage. No tachypnea. No respiratory distress.   Abdominal: She exhibits no distension.   Musculoskeletal: Normal range of motion.   Neurological: She is alert.   Skin:   Skin of face including forehead cheeks periorbital region and perioral region appear edematous with some mild erythema there are no urticaria noted elsewhere         ED Course   Procedures  Labs Reviewed   CBC W/ AUTO DIFFERENTIAL - Abnormal; Notable for the following components:       Result Value    MCHC 31.7 (*)     RDW 15.9 (*)     Lymph # 0.8 (*)     Mono # 0.2 (*)     Gran% 79.3 (*)     Lymph% 15.1 (*)     All other components within normal limits   COMPREHENSIVE METABOLIC PANEL - Abnormal; Notable for the following components:    Sodium 135 (*)      (*)     ALT 73 (*)     Anion Gap 7 (*)     All other components within normal limits          Imaging Results    None          Medical Decision Making:   Initial Assessment:   Patient presenting to the ED with several day history of swelling of her face with some mild erythema of the skin had received an infusion of medication she is an oncology patient because of the  continuation of the swelling she presents here    Will do labs to rule out any blood abnormality and will get Oncology to see the patient  Clinical Tests:   Lab Tests: Ordered and Reviewed              Attending Attestation:             Attending ED Notes:   1:34 PM labs reviewed no neutropenia.    Patient evaluated by the Oncology team it was felt that she is having a reaction to the medication that she was given recently using this is preceded with steroids but this was not done during her last infusion this will be done when she returns for her in infusion tomorrow she is discharged and is to continue taking her prednisone and Benadryl             Clinical Impression:     1. Medication reaction, initial encounter        Disposition:   Disposition: Discharged  Condition: Stable    Scribe attestation                    Catalino Scott MD  01/12/19 9170

## 2019-01-07 NOTE — ED TRIAGE NOTES
Pt had chemo treatment on Thursday, completed treatment. Woke up Friday with facial swelling. States it has went down a little after going to urgent care but still has swelling to face. Denies sob but feels swelling in throat and has a dry feeling in throat.

## 2019-01-07 NOTE — ED NOTES
Pt in bed with rails up and call light within reach. Pt has no needs at this time. VSS. NAD noted.  Will continue to monitor.

## 2019-01-07 NOTE — ED NOTES
Pt in bed with rails up and call light within reach. Pt has no needs at this time. VSS. NAD noted.  Will continue to monitor. Pt waiting on oncology to come see pt.

## 2019-01-07 NOTE — TELEPHONE ENCOUNTER
Spokw with pt to see how the swelling in her face/around eyes was doing. Pt informed that she was able to go the ER today and got some medicine IV to help reduce the inflammation she was having. Pt informed she is still coming to follow up with Dr Reyna tomorrow. Informed pt glad to hear she is getting a little better and for her to let us know if she needs anything before then.

## 2019-01-07 NOTE — PROGRESS NOTES
Subjective:       Patient ID: Ermelinda Verde is a 58 y.o. female.    Chief Complaint: No chief complaint on file.    Mrs Verde is  a very pleasant 57-year-old -American female who returns for a diagnosis of left breast cancer.  She is a patient of Dr. Rose and also a patient of .    She is S/P 3 cycles of nab-paclitaxel and Atezolizumab.   PET scan in December showed near complete resolution of her adenopathy.    Her treatment has been complicated by  a recurring rash, felt likely drug related. Atezolizumab held at last treatment.  She went to urgent care on the 4th with increased facial swelling and received steroids and antihistamines.  Yesterday she was seen in the emergency room with continued problems.  She has had no immunotherapy for a month.          Onc History:  She developed a palpable abnormality in her left breast in January 2017 which she noted on self-examination.  A diagnostic mammogram on January 19 showed a greater than 1 cm nodule in the upper outer portion of left breast.  By ultrasound this was lobulated and hypoechoic measuring 1.75 x 1.51 x 1.96 cm.     On January 24, 2017 a core needle biopsy was performed which showed infiltrating ductal carcinoma, high grade.  The tumor was ER negative, RI negative, and HER-2 negative.  A follow-up ultrasound on December 6 showed 2.5 x 2.2 x 1.5 cm left breast mass.  There was no abnormality noted in the left axilla.     She underwent sentinel lymph node biopsy on February 22.  That showed 4 negative lymph nodes.     She had 4 cycles of  Wilbur-adjuvantTaxotere and Cytoxan completed  on 5/9/17.     On June 26 she underwent left mastectomy.  That revealed 2 foci of invasive high-grade carcinoma measuring 14 mm and 1.5 mm.  Margins were negative.    She completed 4 cycles of adjuvant Adriamycin on September 12, 2017.      In October, she developed some left supraclavicular lymphadenopathy which turned out to be recurrence.     A  fine-needle aspirate of the lymph node was performed on October 19th.  That showed metastatic carcinoma consistent with breast primary which was ER negative, UT negative and HER 2-negative.          Review of Systems   Constitutional: Negative for activity change, appetite change, fever and unexpected weight change.   HENT:        Oral thrush   Eyes: Positive for itching.   Gastrointestinal: Negative.    Genitourinary: Negative.    Skin: Positive for rash.   Neurological: Negative.    Psychiatric/Behavioral: The patient is nervous/anxious.        Objective:      Physical Exam   Constitutional: She is oriented to person, place, and time. She appears well-developed and well-nourished.        HENT:   Mouth/Throat: No oropharyngeal exudate.   Eyes: No scleral icterus.   Cardiovascular: Normal rate and regular rhythm.   Pulmonary/Chest: Effort normal and breath sounds normal. No respiratory distress. Right breast exhibits no mass, no nipple discharge and no skin change.       Abdominal: Soft. There is no tenderness.   Musculoskeletal:        Lymphadenopathy:     She has no cervical adenopathy.   Neurological: She is alert and oriented to person, place, and time.   Skin:   Slightly hypopigmented scaling areas in the posterior neck and left lower neck/supraclavicular ar   Psychiatric: She has a normal mood and affect. Her behavior is normal. Thought content normal.   Vitals reviewed.      Assessment:     labs on January 7th showed normal CBC, metabolic profile was remarkable for an AST of 265 ALT of 73 bilirubin 0.5  1. Malignant neoplasm of upper-outer quadrant of left breast in female, estrogen receptor negative    2. Regional lymph node metastasis present    3. Encounter for antineoplastic chemotherapy     4. skin rash likely related to Abraxane  Plan:       Will plan to discontinue nab paclitaxel due to side effects and see if we can get approval for substitution of docetaxel.  Return to clinic in 1 week with repeat  LFTs.Distress Screening Results: Psychosocial Distress screening score of Distress Score: 0 noted and reviewed. No intervention indicated.

## 2019-01-07 NOTE — ED NOTES
LOC/ APPEARANCE: The patient is awake, alert and oriented x 4. Pt is speaking appropriately, no slurred speech. Patient resting comfortably and in no acute distress.   SKIN: Skin is warm, dry and intact. Capillary refill <3 seconds. No breakdown or brusing note. Pt has moist mucus membranes. Has swelling to face, mostly in upper half. Swelling around eyes.   MUSCULOSKELETAL: Full range of motion present in all extremities. Hand  equal and leg strength strong bilaterally.   RESPIRATORY: Airway is open and patent. Respirations are even and non labored. Denies shortness of breath.   CARDIAC: Patient has regular rate and rhythm. No peripheral edema noted in extremities. Patient denies chest pain.  GI: Abdomen is soft and non tender. Normal bowel sounds in all four quadrants.   NEUROLOGIC: Eyes open spontaneously and facial expression symmetrical. Pt behavior appropriate to situation, and pt follows commands.  Pt reports sensation present in all extremities when touched with a finger. PERRLA.  : No complaints of frequency, burning, urgency or blood in the urine. No complaints of incontinence.    Pt had chemo on Friday. Pt is being treated for breast cancer. Has had other treatments and has had a little swelling but this time was worse. Pt called chemo clinic on Friday, doctor was out of town and was instructed to go to urgent care on Friday. She was given steroids and has been taking benadryl.

## 2019-01-07 NOTE — ED NOTES
Discharge papers given and explained to patient. All questions answered at this time. Pt taken out of ED in wheelchair. VSS and NAD noted.

## 2019-01-08 ENCOUNTER — OFFICE VISIT (OUTPATIENT)
Dept: HEMATOLOGY/ONCOLOGY | Facility: CLINIC | Age: 59
End: 2019-01-08
Payer: COMMERCIAL

## 2019-01-08 VITALS
WEIGHT: 180.13 LBS | HEIGHT: 65 IN | TEMPERATURE: 98 F | DIASTOLIC BLOOD PRESSURE: 78 MMHG | BODY MASS INDEX: 30.01 KG/M2 | RESPIRATION RATE: 18 BRPM | SYSTOLIC BLOOD PRESSURE: 160 MMHG | HEART RATE: 119 BPM | OXYGEN SATURATION: 100 %

## 2019-01-08 DIAGNOSIS — C50.412 MALIGNANT NEOPLASM OF UPPER-OUTER QUADRANT OF LEFT BREAST IN FEMALE, ESTROGEN RECEPTOR NEGATIVE: Primary | Chronic | ICD-10-CM

## 2019-01-08 DIAGNOSIS — Z17.1 MALIGNANT NEOPLASM OF UPPER-OUTER QUADRANT OF LEFT BREAST IN FEMALE, ESTROGEN RECEPTOR NEGATIVE: Primary | Chronic | ICD-10-CM

## 2019-01-08 DIAGNOSIS — Z51.11 ENCOUNTER FOR ANTINEOPLASTIC CHEMOTHERAPY: ICD-10-CM

## 2019-01-08 DIAGNOSIS — C77.9 REGIONAL LYMPH NODE METASTASIS PRESENT: ICD-10-CM

## 2019-01-08 DIAGNOSIS — L27.0 DRUG RASH: ICD-10-CM

## 2019-01-08 PROCEDURE — 3077F PR MOST RECENT SYSTOLIC BLOOD PRESSURE >= 140 MM HG: ICD-10-PCS | Mod: CPTII,S$GLB,, | Performed by: INTERNAL MEDICINE

## 2019-01-08 PROCEDURE — 3078F DIAST BP <80 MM HG: CPT | Mod: CPTII,S$GLB,, | Performed by: INTERNAL MEDICINE

## 2019-01-08 PROCEDURE — 99999 PR PBB SHADOW E&M-EST. PATIENT-LVL IV: ICD-10-PCS | Mod: PBBFAC,,, | Performed by: INTERNAL MEDICINE

## 2019-01-08 PROCEDURE — 3008F BODY MASS INDEX DOCD: CPT | Mod: CPTII,S$GLB,, | Performed by: INTERNAL MEDICINE

## 2019-01-08 PROCEDURE — 3077F SYST BP >= 140 MM HG: CPT | Mod: CPTII,S$GLB,, | Performed by: INTERNAL MEDICINE

## 2019-01-08 PROCEDURE — 99999 PR PBB SHADOW E&M-EST. PATIENT-LVL IV: CPT | Mod: PBBFAC,,, | Performed by: INTERNAL MEDICINE

## 2019-01-08 PROCEDURE — 99214 PR OFFICE/OUTPT VISIT, EST, LEVL IV, 30-39 MIN: ICD-10-PCS | Mod: S$GLB,,, | Performed by: INTERNAL MEDICINE

## 2019-01-08 PROCEDURE — 99214 OFFICE O/P EST MOD 30 MIN: CPT | Mod: S$GLB,,, | Performed by: INTERNAL MEDICINE

## 2019-01-08 PROCEDURE — 3008F PR BODY MASS INDEX (BMI) DOCUMENTED: ICD-10-PCS | Mod: CPTII,S$GLB,, | Performed by: INTERNAL MEDICINE

## 2019-01-08 PROCEDURE — 3078F PR MOST RECENT DIASTOLIC BLOOD PRESSURE < 80 MM HG: ICD-10-PCS | Mod: CPTII,S$GLB,, | Performed by: INTERNAL MEDICINE

## 2019-01-08 NOTE — Clinical Note
Cancer chemotherapy this week.  Return to clinic in 1 week to start Taxotere with atezolizumab.  Liver profile at that time

## 2019-01-09 ENCOUNTER — TELEPHONE (OUTPATIENT)
Dept: HEMATOLOGY/ONCOLOGY | Facility: CLINIC | Age: 59
End: 2019-01-09

## 2019-01-09 NOTE — TELEPHONE ENCOUNTER
Returned call to pt. Pt informed that she has been fatigued the past day. She is fine when she's resting but gets very tired when she gets up and is moving around. Informed pt it may just be due to her spending several hours in the ER the day prior and not getting much sleep before her 8am appointment yesterday. Pt agreed this could very well be why she is feeling so fatigued. Informed pt to stay hydrated and make sure gets plenty of rest tonight and if she is still feeling this fatigued tomorrow, give us a call and we can try to get her into one of the urgent care slots available. Pt verbalized an understanding and will keep us updated on how she is feeling.         ----- Message from Phoebe Lizarraga sent at 1/9/2019  3:31 PM CST -----  Called and scheduled patient to come in on Tuesday for labs,  apt and chemo.  She stated that she had a few questions concerning her being fatigue and would like the nurse to call her.      ----- Message -----  From: Flavia Barillas RN  Sent: 1/9/2019   3:02 PM  To: Phoebe Lizarraga        ----- Message -----  From: Alex Reyna MD  Sent: 1/9/2019   2:58 PM  To: Flavia Barillas RN    yes  ----- Message -----  From: Flavia Barillas RN  Sent: 1/9/2019   2:32 PM  To: Alex Reyna MD        ----- Message -----  From: Phoebe Lizararga  Sent: 1/9/2019   2:29 PM  To: Axel RODRÍGUEZ Staff    Her chemo is approved. Does she need to see  next week ?    Message   Received: Today   Message Contents   MD Phoebe Salazar     Cancer chemotherapy this week.  Return to clinic in 1 week to start Taxotere with atezolizumab.  Liver profile at that time

## 2019-01-09 NOTE — TELEPHONE ENCOUNTER
----- Message from Flavia Barillas RN sent at 1/9/2019  3:02 PM CST -----      ----- Message -----  From: Alex Reyna MD  Sent: 1/9/2019   2:58 PM  To: Flavia Barillas RN    yes  ----- Message -----  From: Flavia Barillas RN  Sent: 1/9/2019   2:32 PM  To: Alex Reyna MD        ----- Message -----  From: Phoebe Lizarraga  Sent: 1/9/2019   2:29 PM  To: Axel RODRÍGUEZ Staff    Her chemo is approved. Does she need to see  next week ?    Message   Received: Today   Message Contents   MD Phoebe Salazar     Cancer chemotherapy this week.  Return to clinic in 1 week to start Taxotere with atezolizumab.  Liver profile at that time

## 2019-01-09 NOTE — TELEPHONE ENCOUNTER
Called and scheduled patient to come in on Tuesday for labs, dr.cole abebe and chemo.  She stated that she had a few questions concerning her being fatigue and would like the nurse to call her. I told her I would send a message over and the nurse would call her shortly. She voiced appreciation

## 2019-01-14 NOTE — PROGRESS NOTES
Subjective:       Patient ID: Ermelinda Verde is a 58 y.o. female.    Chief Complaint: No chief complaint on file.    Mrs Verde is  a very pleasant 58-year-old -American female who returns for a diagnosis of left breast cancer.  She is a patient of Dr. Rose and also a patient of .    She is S/P 3 cycles of nab-paclitaxel and Atezolizumab.   PET scan in December showed near complete resolution of her adenopathy.    Her treatment has been complicated by  a recurring rash, felt likely drug related due to her Nab paclitaxel.     Over the last week she really is not felt any better.  Her facial swelling is not improved.  She feels a swollen sensation in her neck and some pressure and tightness and a fluid like sensation.  Swallowing not been significantly affected but she sometimes has to swallow several times.  In addition she feels weak across her upper chest with an achy sensation.  There has been no change in her breathing.  She has had no fever.  She has not been able to get her nystatin filled.            Onc History:  She developed a palpable abnormality in her left breast in January 2017 which she noted on self-examination.  A diagnostic mammogram on January 19 showed a greater than 1 cm nodule in the upper outer portion of left breast.  By ultrasound this was lobulated and hypoechoic measuring 1.75 x 1.51 x 1.96 cm.     On January 24, 2017 a core needle biopsy was performed which showed infiltrating ductal carcinoma, high grade.  The tumor was ER negative, AL negative, and HER-2 negative.  A follow-up ultrasound on December 6 showed 2.5 x 2.2 x 1.5 cm left breast mass.  There was no abnormality noted in the left axilla.     She underwent sentinel lymph node biopsy on February 22.  That showed 4 negative lymph nodes.     She had 4 cycles of  Wilbur-adjuvantTaxotere and Cytoxan completed  on 5/9/17.     On June 26 she underwent left mastectomy.  That revealed 2 foci of invasive high-grade  carcinoma measuring 14 mm and 1.5 mm.  Margins were negative.    She completed 4 cycles of adjuvant Adriamycin on September 12, 2017.      In October, she developed some left supraclavicular lymphadenopathy which turned out to be recurrence.     A fine-needle aspirate of the lymph node was performed on October 19th.  That showed metastatic carcinoma consistent with breast primary which was ER negative, [place] negative and HER 2-negative.          Review of Systems   Constitutional: Negative for activity change, appetite change, fever and unexpected weight change.   HENT:        Oral thrush  Facial swelling   Eyes: Positive for itching.   Respiratory: Positive for shortness of breath (Mild).    Gastrointestinal: Negative.    Genitourinary: Negative.    Skin: Positive for rash.   Neurological: Negative.    Psychiatric/Behavioral: The patient is nervous/anxious.        Objective:      Physical Exam   Constitutional: She is oriented to person, place, and time. She appears well-developed and well-nourished.        HENT:   Mouth/Throat: No oropharyngeal exudate.   Facial swelling, slight fullness in the neck but no palpable adenopathy   Eyes: No scleral icterus.   Neck:   Slight fullness   Cardiovascular: Normal rate and regular rhythm.   Pulmonary/Chest: Effort normal and breath sounds normal. No respiratory distress. Right breast exhibits no mass, no nipple discharge and no skin change.       Abdominal: Soft. She exhibits no mass. There is no tenderness.   Musculoskeletal:        Lymphadenopathy:     She has no cervical adenopathy.   Neurological: She is alert and oriented to person, place, and time.   Skin: Rash (Scaly slightly erythematous rash on face) noted.   Slightly hypopigmented scaling areas in the posterior neck and left lower neck/supraclavicular ar   Psychiatric: She has a normal mood and affect. Her behavior is normal. Thought content normal.   Vitals reviewed.      Assessment:     metabolic profile shows  glucose 116, creatinine 0.8,  ALT 85, TSH is normal  1. Malignant neoplasm of upper-outer quadrant of left breast in female, estrogen receptor negative    2. Encounter for antineoplastic chemotherapy    3. Drug rash     4. facial swelling medication effect versus vascular compression  Plan:       Hold chemotherapy.    Check CT of neck and chest to rule out vascular issues.  Nystatin for oral thrush      Distress Screening Results: Psychosocial Distress screening score of Distress Score: 0 noted and reviewed. No intervention indicated.       CT scans showed no evidence of any vascular obstruction or new mass.  Will restart prednisone to see if we can speed up her improvement.  She will call me on the 21st with an update.

## 2019-01-15 ENCOUNTER — OFFICE VISIT (OUTPATIENT)
Dept: HEMATOLOGY/ONCOLOGY | Facility: CLINIC | Age: 59
End: 2019-01-15
Payer: COMMERCIAL

## 2019-01-15 ENCOUNTER — HOSPITAL ENCOUNTER (OUTPATIENT)
Dept: RADIOLOGY | Facility: HOSPITAL | Age: 59
Discharge: HOME | End: 2019-01-15
Attending: INTERNAL MEDICINE
Payer: COMMERCIAL

## 2019-01-15 VITALS
HEART RATE: 106 BPM | BODY MASS INDEX: 30.89 KG/M2 | TEMPERATURE: 98 F | WEIGHT: 185.44 LBS | SYSTOLIC BLOOD PRESSURE: 117 MMHG | RESPIRATION RATE: 20 BRPM | HEIGHT: 65 IN | OXYGEN SATURATION: 98 % | DIASTOLIC BLOOD PRESSURE: 63 MMHG

## 2019-01-15 DIAGNOSIS — Z17.1 MALIGNANT NEOPLASM OF UPPER-OUTER QUADRANT OF LEFT BREAST IN FEMALE, ESTROGEN RECEPTOR NEGATIVE: Chronic | ICD-10-CM

## 2019-01-15 DIAGNOSIS — C50.412 MALIGNANT NEOPLASM OF UPPER-OUTER QUADRANT OF LEFT BREAST IN FEMALE, ESTROGEN RECEPTOR NEGATIVE: Primary | Chronic | ICD-10-CM

## 2019-01-15 DIAGNOSIS — Z17.1 MALIGNANT NEOPLASM OF UPPER-OUTER QUADRANT OF LEFT BREAST IN FEMALE, ESTROGEN RECEPTOR NEGATIVE: Primary | Chronic | ICD-10-CM

## 2019-01-15 DIAGNOSIS — Z51.11 ENCOUNTER FOR ANTINEOPLASTIC CHEMOTHERAPY: ICD-10-CM

## 2019-01-15 DIAGNOSIS — B37.0 ORAL THRUSH: ICD-10-CM

## 2019-01-15 DIAGNOSIS — L27.0 DRUG RASH: ICD-10-CM

## 2019-01-15 DIAGNOSIS — C50.412 MALIGNANT NEOPLASM OF UPPER-OUTER QUADRANT OF LEFT BREAST IN FEMALE, ESTROGEN RECEPTOR NEGATIVE: Chronic | ICD-10-CM

## 2019-01-15 PROCEDURE — 3078F DIAST BP <80 MM HG: CPT | Mod: CPTII,S$GLB,, | Performed by: INTERNAL MEDICINE

## 2019-01-15 PROCEDURE — 3074F SYST BP LT 130 MM HG: CPT | Mod: CPTII,S$GLB,, | Performed by: INTERNAL MEDICINE

## 2019-01-15 PROCEDURE — 70491 CT NECK CHEST WITH CONTRAST (XPD): ICD-10-PCS | Mod: 26,,, | Performed by: RADIOLOGY

## 2019-01-15 PROCEDURE — 99214 PR OFFICE/OUTPT VISIT, EST, LEVL IV, 30-39 MIN: ICD-10-PCS | Mod: S$GLB,,, | Performed by: INTERNAL MEDICINE

## 2019-01-15 PROCEDURE — 99999 PR PBB SHADOW E&M-EST. PATIENT-LVL V: CPT | Mod: PBBFAC,,, | Performed by: INTERNAL MEDICINE

## 2019-01-15 PROCEDURE — 70491 CT SOFT TISSUE NECK W/DYE: CPT | Mod: 26,,, | Performed by: RADIOLOGY

## 2019-01-15 PROCEDURE — 71260 CT NECK CHEST WITH CONTRAST (XPD): ICD-10-PCS | Mod: 26,,, | Performed by: RADIOLOGY

## 2019-01-15 PROCEDURE — 3078F PR MOST RECENT DIASTOLIC BLOOD PRESSURE < 80 MM HG: ICD-10-PCS | Mod: CPTII,S$GLB,, | Performed by: INTERNAL MEDICINE

## 2019-01-15 PROCEDURE — 99214 OFFICE O/P EST MOD 30 MIN: CPT | Mod: S$GLB,,, | Performed by: INTERNAL MEDICINE

## 2019-01-15 PROCEDURE — 3008F PR BODY MASS INDEX (BMI) DOCUMENTED: ICD-10-PCS | Mod: CPTII,S$GLB,, | Performed by: INTERNAL MEDICINE

## 2019-01-15 PROCEDURE — 71260 CT THORAX DX C+: CPT | Mod: 26,,, | Performed by: RADIOLOGY

## 2019-01-15 PROCEDURE — 99999 PR PBB SHADOW E&M-EST. PATIENT-LVL V: ICD-10-PCS | Mod: PBBFAC,,, | Performed by: INTERNAL MEDICINE

## 2019-01-15 PROCEDURE — 3008F BODY MASS INDEX DOCD: CPT | Mod: CPTII,S$GLB,, | Performed by: INTERNAL MEDICINE

## 2019-01-15 PROCEDURE — 25500020 PHARM REV CODE 255: Performed by: INTERNAL MEDICINE

## 2019-01-15 PROCEDURE — 3074F PR MOST RECENT SYSTOLIC BLOOD PRESSURE < 130 MM HG: ICD-10-PCS | Mod: CPTII,S$GLB,, | Performed by: INTERNAL MEDICINE

## 2019-01-15 PROCEDURE — 71260 CT THORAX DX C+: CPT | Mod: TC

## 2019-01-15 RX ORDER — NYSTATIN 100000 [USP'U]/ML
500000 SUSPENSION ORAL 4 TIMES DAILY
Qty: 473 ML | Refills: 0 | Status: ON HOLD | OUTPATIENT
Start: 2019-01-15 | End: 2019-02-13 | Stop reason: HOSPADM

## 2019-01-15 RX ADMIN — IOHEXOL 100 ML: 350 INJECTION, SOLUTION INTRAVENOUS at 04:01

## 2019-01-16 DIAGNOSIS — L27.0 DRUG RASH: Primary | ICD-10-CM

## 2019-01-16 RX ORDER — PREDNISONE 20 MG/1
TABLET ORAL
Qty: 14 TABLET | Refills: 0 | Status: ON HOLD | OUTPATIENT
Start: 2019-01-16 | End: 2019-02-13 | Stop reason: HOSPADM

## 2019-01-21 ENCOUNTER — TELEPHONE (OUTPATIENT)
Dept: HEMATOLOGY/ONCOLOGY | Facility: CLINIC | Age: 59
End: 2019-01-21

## 2019-01-21 ENCOUNTER — PATIENT MESSAGE (OUTPATIENT)
Dept: HEMATOLOGY/ONCOLOGY | Facility: CLINIC | Age: 59
End: 2019-01-21

## 2019-01-21 NOTE — TELEPHONE ENCOUNTER
Spoke with pt. Pt wanted to give Dr Reyna an update on her swelling and how she's doing. Pt states the swelling has gone down from what it was last time she was seen, but it is still there every morning when she wakes up and seems like it's a slow process of going away. Pt is wanting to see if Dr Reyna has any recommendations that may help this to go away sooner rather than later. Informed pt would forward this to him. Pt verbalized understanding.    Msg fwd         ----- Message from Audrey Munoz sent at 1/21/2019  8:08 AM CST -----  Contact: Pt   Contacting the  About swelling last week. Please contact and advise of what to do or what medication can be taken.    Contact preference: [phone]

## 2019-01-21 NOTE — PROGRESS NOTES
Subjective:       Patient ID: Ermelinda Verde is a 58 y.o. female.    Chief Complaint: No chief complaint on file.    Mrs Verde is  a very pleasant 58-year-old -American female who returns for a diagnosis of left breast cancer.  She is a patient of Dr. Rose and also a patient of .    She is S/P 3 cycles of nab-paclitaxel and Atezolizumab.   PET scan in December showed near complete resolution of her adenopathy.    Her treatment has been complicated by  a recurring rash, felt likely drug related due to her Nab paclitaxel.  Last week she had persistent facial swelling.  CT scan of the neck and chest did not show any evidence of vascular compression.  There were normal size lymph nodes in the neck.  She was started on prednisone 60 mg with a rapid taper.    She continues to have tightness in the chest and arms feels that her arms or week.  She also has some occasional difficulty with swallowing including liquids.  She has been able to eat but has to only take small amounts at a time.            Onc History:  She developed a palpable abnormality in her left breast in January 2017 which she noted on self-examination.  A diagnostic mammogram on January 19 showed a greater than 1 cm nodule in the upper outer portion of left breast.  By ultrasound this was lobulated and hypoechoic measuring 1.75 x 1.51 x 1.96 cm.     On January 24, 2017 a core needle biopsy was performed which showed infiltrating ductal carcinoma, high grade.  The tumor was ER negative, [place] negative, and HER-2 negative.  A follow-up ultrasound on December 6 showed 2.5 x 2.2 x 1.5 cm left breast mass.  There was no abnormality noted in the left axilla.     She underwent sentinel lymph node biopsy on February 22.  That showed 4 negative lymph nodes.     She had 4 cycles of  Wilbur-adjuvantTaxotere and Cytoxan completed  on 5/9/17.     On June 26 she underwent left mastectomy.  That revealed 2 foci of invasive high-grade carcinoma measuring  14 mm and 1.5 mm.  Margins were negative.    She completed 4 cycles of adjuvant Adriamycin on September 12, 2017.      In October, she developed some left supraclavicular lymphadenopathy which turned out to be recurrence.     A fine-needle aspirate of the lymph node was performed on October 19th.  That showed metastatic carcinoma consistent with breast primary which was ER negative, [place] negative and HER 2-negative.          Review of Systems   Constitutional: Negative for activity change, appetite change, fever and unexpected weight change.   Eyes: Positive for itching.   Respiratory: Positive for shortness of breath.    Gastrointestinal: Negative.    Genitourinary: Negative.    Skin: Positive for rash.   Neurological: Negative.    Psychiatric/Behavioral: The patient is nervous/anxious.        Objective:      Physical Exam   Constitutional: She is oriented to person, place, and time. She appears well-developed and well-nourished.        HENT:   Mouth/Throat: No oropharyngeal exudate.   Facial swelling, slight fullness in the neck but no palpable adenopathy   Eyes: No scleral icterus.   Neck:   Slight fullness   Cardiovascular: Normal rate and regular rhythm.   Pulmonary/Chest: Effort normal and breath sounds normal. No respiratory distress. Right breast exhibits no mass, no nipple discharge and no skin change.       Abdominal: Soft. She exhibits no mass. There is no tenderness.   Musculoskeletal:        Lymphadenopathy:     She has no cervical adenopathy.   Neurological: She is alert and oriented to person, place, and time.   Skin: Rash (Scaly slightly erythematous rash on face arms and legs) noted.   Slightly hypopigmented scaling areas in the posterior neck and left lower neck/supraclavicular ar   Psychiatric: She has a normal mood and affect. Her behavior is normal. Thought content normal.   Vitals reviewed.      Assessment:     metabolic profile shows creatinine 0.8, , ALT 80, bilirubin 0.4  1. Malignant  neoplasm of upper-outer quadrant of left breast in female, estrogen receptor negative    2. Regional lymph node metastasis present    3. Encounter for antineoplastic chemotherapy    4. Drug rash       Plan:       We will check CPK today to rule out any sort of muscle inflammation.  Discussed with the patient and her  the need to make sure that her symptoms are adequately resolved before trying to restart any chemotherapy.  I will plan to see her again in 2 weeks with repeat labs and possibly start Taxotere at that time.  The decision about adding immunotherapy to that will depend upon her clinical course.        .Distress Screening Results: Psychosocial Distress screening score of Distress Score: 0 noted and reviewed. No intervention indicated.

## 2019-01-22 ENCOUNTER — OFFICE VISIT (OUTPATIENT)
Dept: HEMATOLOGY/ONCOLOGY | Facility: CLINIC | Age: 59
End: 2019-01-22
Payer: COMMERCIAL

## 2019-01-22 ENCOUNTER — HOSPITAL ENCOUNTER (INPATIENT)
Facility: HOSPITAL | Age: 59
LOS: 22 days | Discharge: NURSING, REHAB OR LONG-TERM CARE | DRG: 598 | End: 2019-02-13
Attending: INTERNAL MEDICINE | Admitting: INTERNAL MEDICINE
Payer: COMMERCIAL

## 2019-01-22 ENCOUNTER — TELEPHONE (OUTPATIENT)
Dept: HEMATOLOGY/ONCOLOGY | Facility: CLINIC | Age: 59
End: 2019-01-22

## 2019-01-22 VITALS
SYSTOLIC BLOOD PRESSURE: 140 MMHG | DIASTOLIC BLOOD PRESSURE: 78 MMHG | HEIGHT: 65 IN | HEART RATE: 123 BPM | WEIGHT: 182.13 LBS | RESPIRATION RATE: 24 BRPM | TEMPERATURE: 98 F | OXYGEN SATURATION: 100 % | BODY MASS INDEX: 30.34 KG/M2

## 2019-01-22 DIAGNOSIS — R73.03 PRE-DIABETES: ICD-10-CM

## 2019-01-22 DIAGNOSIS — I10 ESSENTIAL HYPERTENSION: ICD-10-CM

## 2019-01-22 DIAGNOSIS — M60.9 MYOSITIS: ICD-10-CM

## 2019-01-22 DIAGNOSIS — Z17.1 MALIGNANT NEOPLASM OF UPPER-OUTER QUADRANT OF LEFT BREAST IN FEMALE, ESTROGEN RECEPTOR NEGATIVE: Chronic | ICD-10-CM

## 2019-01-22 DIAGNOSIS — M62.82 NON-TRAUMATIC RHABDOMYOLYSIS: ICD-10-CM

## 2019-01-22 DIAGNOSIS — M33.20 POLYMYOSITIS ASSOCIATED WITH AUTOIMMUNE DISEASE: ICD-10-CM

## 2019-01-22 DIAGNOSIS — C50.412 MALIGNANT NEOPLASM OF UPPER-OUTER QUADRANT OF LEFT BREAST IN FEMALE, ESTROGEN RECEPTOR NEGATIVE: Chronic | ICD-10-CM

## 2019-01-22 DIAGNOSIS — E87.6 HYPOKALEMIA: ICD-10-CM

## 2019-01-22 DIAGNOSIS — T79.6XXS TRAUMATIC RHABDOMYOLYSIS, SEQUELA: ICD-10-CM

## 2019-01-22 DIAGNOSIS — M60.9 MYOSITIS, UNSPECIFIED MYOSITIS TYPE, UNSPECIFIED SITE: Primary | ICD-10-CM

## 2019-01-22 DIAGNOSIS — M33.13 DERMATOMYOSITIS: ICD-10-CM

## 2019-01-22 DIAGNOSIS — R13.19 ESOPHAGEAL DYSPHAGIA: ICD-10-CM

## 2019-01-22 DIAGNOSIS — B37.9 CANDIDIASIS: ICD-10-CM

## 2019-01-22 DIAGNOSIS — M33.20 POLYMYOSITIS ASSOCIATED WITH AUTOIMMUNE DISEASE: Primary | ICD-10-CM

## 2019-01-22 DIAGNOSIS — M35.9 POLYMYOSITIS ASSOCIATED WITH AUTOIMMUNE DISEASE: ICD-10-CM

## 2019-01-22 DIAGNOSIS — C50.412 MALIGNANT NEOPLASM OF UPPER-OUTER QUADRANT OF LEFT BREAST IN FEMALE, ESTROGEN RECEPTOR NEGATIVE: Primary | Chronic | ICD-10-CM

## 2019-01-22 DIAGNOSIS — L27.0 DRUG RASH: ICD-10-CM

## 2019-01-22 DIAGNOSIS — I10 HYPERTENSION: ICD-10-CM

## 2019-01-22 DIAGNOSIS — M79.89 SWELLING OF UPPER ARM: ICD-10-CM

## 2019-01-22 DIAGNOSIS — L30.9 DERMATITIS: ICD-10-CM

## 2019-01-22 DIAGNOSIS — E53.8 VITAMIN B12 DEFICIENCY: ICD-10-CM

## 2019-01-22 DIAGNOSIS — C50.412 BREAST CANCER OF UPPER-OUTER QUADRANT OF LEFT FEMALE BREAST: Chronic | ICD-10-CM

## 2019-01-22 DIAGNOSIS — H93.13 TINNITUS OF BOTH EARS: ICD-10-CM

## 2019-01-22 DIAGNOSIS — F43.21 ADJUSTMENT DISORDER WITH DEPRESSED MOOD: ICD-10-CM

## 2019-01-22 DIAGNOSIS — J98.8 CONGESTION OF UPPER AIRWAY: ICD-10-CM

## 2019-01-22 DIAGNOSIS — R13.10 DYSPHAGIA: ICD-10-CM

## 2019-01-22 DIAGNOSIS — Z51.11 ENCOUNTER FOR ANTINEOPLASTIC CHEMOTHERAPY: ICD-10-CM

## 2019-01-22 DIAGNOSIS — M35.9 POLYMYOSITIS ASSOCIATED WITH AUTOIMMUNE DISEASE: Primary | ICD-10-CM

## 2019-01-22 DIAGNOSIS — C77.9 REGIONAL LYMPH NODE METASTASIS PRESENT: ICD-10-CM

## 2019-01-22 DIAGNOSIS — Z74.09 IMPAIRED FUNCTIONAL MOBILITY AND ENDURANCE: ICD-10-CM

## 2019-01-22 DIAGNOSIS — M62.82 RHABDOMYOLYSIS: ICD-10-CM

## 2019-01-22 DIAGNOSIS — E87.1 HYPONATREMIA: ICD-10-CM

## 2019-01-22 DIAGNOSIS — Z17.1 MALIGNANT NEOPLASM OF UPPER-OUTER QUADRANT OF LEFT BREAST IN FEMALE, ESTROGEN RECEPTOR NEGATIVE: Primary | Chronic | ICD-10-CM

## 2019-01-22 LAB
ALBUMIN SERPL BCP-MCNC: 3 G/DL
ALP SERPL-CCNC: 96 U/L
ALT SERPL W/O P-5'-P-CCNC: 73 U/L
ANION GAP SERPL CALC-SCNC: 8 MMOL/L
APTT BLDCRRT: 25.2 SEC
AST SERPL-CCNC: 266 U/L
BILIRUB SERPL-MCNC: 0.4 MG/DL
BUN SERPL-MCNC: 20 MG/DL
CALCIUM SERPL-MCNC: 8.7 MG/DL
CHLORIDE SERPL-SCNC: 96 MMOL/L
CK SERPL-CCNC: 4319 U/L
CO2 SERPL-SCNC: 26 MMOL/L
CREAT SERPL-MCNC: 0.8 MG/DL
CRP SERPL-MCNC: 31.1 MG/L
ERYTHROCYTE [SEDIMENTATION RATE] IN BLOOD BY WESTERGREN METHOD: 50 MM/HR
EST. GFR  (AFRICAN AMERICAN): >60 ML/MIN/1.73 M^2
EST. GFR  (NON AFRICAN AMERICAN): >60 ML/MIN/1.73 M^2
ESTIMATED AVG GLUCOSE: 134 MG/DL
GLUCOSE SERPL-MCNC: 158 MG/DL
HBA1C MFR BLD HPLC: 6.3 %
INR PPP: 1.1
LACTATE SERPL-SCNC: 1.2 MMOL/L
POTASSIUM SERPL-SCNC: 3.7 MMOL/L
PROT SERPL-MCNC: 6.7 G/DL
PROTHROMBIN TIME: 10.9 SEC
SODIUM SERPL-SCNC: 130 MMOL/L
TSH SERPL DL<=0.005 MIU/L-ACNC: 3.59 UIU/ML

## 2019-01-22 PROCEDURE — 83605 ASSAY OF LACTIC ACID: CPT

## 2019-01-22 PROCEDURE — 86235 NUCLEAR ANTIGEN ANTIBODY: CPT | Mod: 59

## 2019-01-22 PROCEDURE — 99999 PR PBB SHADOW E&M-EST. PATIENT-LVL IV: ICD-10-PCS | Mod: PBBFAC,,, | Performed by: INTERNAL MEDICINE

## 2019-01-22 PROCEDURE — 83930 ASSAY OF BLOOD OSMOLALITY: CPT

## 2019-01-22 PROCEDURE — 3078F PR MOST RECENT DIASTOLIC BLOOD PRESSURE < 80 MM HG: ICD-10-PCS | Mod: CPTII,S$GLB,, | Performed by: INTERNAL MEDICINE

## 2019-01-22 PROCEDURE — 85610 PROTHROMBIN TIME: CPT

## 2019-01-22 PROCEDURE — 99223 1ST HOSP IP/OBS HIGH 75: CPT | Mod: ,,, | Performed by: INTERNAL MEDICINE

## 2019-01-22 PROCEDURE — 86039 ANTINUCLEAR ANTIBODIES (ANA): CPT

## 2019-01-22 PROCEDURE — 80053 COMPREHEN METABOLIC PANEL: CPT | Mod: 91

## 2019-01-22 PROCEDURE — 63600175 PHARM REV CODE 636 W HCPCS: Performed by: INTERNAL MEDICINE

## 2019-01-22 PROCEDURE — 99499 UNLISTED E&M SERVICE: CPT | Mod: S$GLB,,, | Performed by: INTERNAL MEDICINE

## 2019-01-22 PROCEDURE — 99499 NO LOS: ICD-10-PCS | Mod: S$GLB,,, | Performed by: INTERNAL MEDICINE

## 2019-01-22 PROCEDURE — 86038 ANTINUCLEAR ANTIBODIES: CPT

## 2019-01-22 PROCEDURE — 86140 C-REACTIVE PROTEIN: CPT

## 2019-01-22 PROCEDURE — 93010 ELECTROCARDIOGRAM REPORT: CPT | Mod: ,,, | Performed by: INTERNAL MEDICINE

## 2019-01-22 PROCEDURE — 84443 ASSAY THYROID STIM HORMONE: CPT

## 2019-01-22 PROCEDURE — 25000003 PHARM REV CODE 250: Performed by: INTERNAL MEDICINE

## 2019-01-22 PROCEDURE — 83516 IMMUNOASSAY NONANTIBODY: CPT | Mod: 59

## 2019-01-22 PROCEDURE — 83036 HEMOGLOBIN GLYCOSYLATED A1C: CPT

## 2019-01-22 PROCEDURE — 82085 ASSAY OF ALDOLASE: CPT

## 2019-01-22 PROCEDURE — 85730 THROMBOPLASTIN TIME PARTIAL: CPT

## 2019-01-22 PROCEDURE — 3078F DIAST BP <80 MM HG: CPT | Mod: CPTII,S$GLB,, | Performed by: INTERNAL MEDICINE

## 2019-01-22 PROCEDURE — 99252 PR INITIAL INPATIENT CONSULT,LEVL II: ICD-10-PCS | Mod: ,,, | Performed by: INTERNAL MEDICINE

## 2019-01-22 PROCEDURE — 99223 PR INITIAL HOSPITAL CARE,LEVL III: ICD-10-PCS | Mod: ,,, | Performed by: INTERNAL MEDICINE

## 2019-01-22 PROCEDURE — 99252 IP/OBS CONSLTJ NEW/EST SF 35: CPT | Mod: ,,, | Performed by: INTERNAL MEDICINE

## 2019-01-22 PROCEDURE — 20600001 HC STEP DOWN PRIVATE ROOM

## 2019-01-22 PROCEDURE — 3077F PR MOST RECENT SYSTOLIC BLOOD PRESSURE >= 140 MM HG: ICD-10-PCS | Mod: CPTII,S$GLB,, | Performed by: INTERNAL MEDICINE

## 2019-01-22 PROCEDURE — 93005 ELECTROCARDIOGRAM TRACING: CPT

## 2019-01-22 PROCEDURE — 3077F SYST BP >= 140 MM HG: CPT | Mod: CPTII,S$GLB,, | Performed by: INTERNAL MEDICINE

## 2019-01-22 PROCEDURE — 3008F PR BODY MASS INDEX (BMI) DOCUMENTED: ICD-10-PCS | Mod: CPTII,S$GLB,, | Performed by: INTERNAL MEDICINE

## 2019-01-22 PROCEDURE — 82550 ASSAY OF CK (CPK): CPT | Mod: 91

## 2019-01-22 PROCEDURE — 85652 RBC SED RATE AUTOMATED: CPT

## 2019-01-22 PROCEDURE — 99999 PR PBB SHADOW E&M-EST. PATIENT-LVL IV: CPT | Mod: PBBFAC,,, | Performed by: INTERNAL MEDICINE

## 2019-01-22 PROCEDURE — 93010 EKG 12-LEAD: ICD-10-PCS | Mod: ,,, | Performed by: INTERNAL MEDICINE

## 2019-01-22 PROCEDURE — 3008F BODY MASS INDEX DOCD: CPT | Mod: CPTII,S$GLB,, | Performed by: INTERNAL MEDICINE

## 2019-01-22 RX ORDER — ESCITALOPRAM OXALATE 5 MG/1
10 TABLET ORAL DAILY
Status: DISCONTINUED | OUTPATIENT
Start: 2019-01-23 | End: 2019-01-28

## 2019-01-22 RX ORDER — IBUPROFEN 200 MG
16 TABLET ORAL
Status: DISCONTINUED | OUTPATIENT
Start: 2019-01-22 | End: 2019-02-13 | Stop reason: HOSPADM

## 2019-01-22 RX ORDER — AMLODIPINE BESYLATE 10 MG/1
10 TABLET ORAL DAILY
Status: DISCONTINUED | OUTPATIENT
Start: 2019-01-23 | End: 2019-01-28

## 2019-01-22 RX ORDER — GLUCAGON 1 MG
1 KIT INJECTION
Status: DISCONTINUED | OUTPATIENT
Start: 2019-01-22 | End: 2019-02-13 | Stop reason: HOSPADM

## 2019-01-22 RX ORDER — CALCIUM CARBONATE 500(1250)
500 TABLET ORAL DAILY
Status: DISCONTINUED | OUTPATIENT
Start: 2019-01-23 | End: 2019-01-28

## 2019-01-22 RX ORDER — ACETAMINOPHEN 325 MG/1
650 TABLET ORAL EVERY 4 HOURS PRN
Status: DISCONTINUED | OUTPATIENT
Start: 2019-01-22 | End: 2019-02-13 | Stop reason: HOSPADM

## 2019-01-22 RX ORDER — POLYETHYLENE GLYCOL 3350 17 G/17G
17 POWDER, FOR SOLUTION ORAL 2 TIMES DAILY PRN
Status: DISCONTINUED | OUTPATIENT
Start: 2019-01-22 | End: 2019-02-13 | Stop reason: HOSPADM

## 2019-01-22 RX ORDER — CYANOCOBALAMIN (VITAMIN B-12) 250 MCG
500 TABLET ORAL DAILY
Status: DISCONTINUED | OUTPATIENT
Start: 2019-01-23 | End: 2019-01-28

## 2019-01-22 RX ORDER — ONDANSETRON 8 MG/1
8 TABLET, ORALLY DISINTEGRATING ORAL EVERY 8 HOURS PRN
Status: DISCONTINUED | OUTPATIENT
Start: 2019-01-22 | End: 2019-02-13 | Stop reason: HOSPADM

## 2019-01-22 RX ORDER — ALPRAZOLAM 0.25 MG/1
0.25 TABLET ORAL 3 TIMES DAILY PRN
Status: DISCONTINUED | OUTPATIENT
Start: 2019-01-22 | End: 2019-02-13 | Stop reason: HOSPADM

## 2019-01-22 RX ORDER — IBUPROFEN 200 MG
24 TABLET ORAL
Status: DISCONTINUED | OUTPATIENT
Start: 2019-01-22 | End: 2019-02-13 | Stop reason: HOSPADM

## 2019-01-22 RX ORDER — ENOXAPARIN SODIUM 100 MG/ML
40 INJECTION SUBCUTANEOUS EVERY 24 HOURS
Status: DISCONTINUED | OUTPATIENT
Start: 2019-01-22 | End: 2019-02-07

## 2019-01-22 RX ORDER — LANOLIN ALCOHOL/MO/W.PET/CERES
800 CREAM (GRAM) TOPICAL
Status: DISCONTINUED | OUTPATIENT
Start: 2019-01-22 | End: 2019-01-23

## 2019-01-22 RX ORDER — FLUTICASONE PROPIONATE 50 MCG
1 SPRAY, SUSPENSION (ML) NASAL DAILY
Status: DISCONTINUED | OUTPATIENT
Start: 2019-01-23 | End: 2019-01-28

## 2019-01-22 RX ORDER — PROMETHAZINE HYDROCHLORIDE 12.5 MG/1
12.5 TABLET ORAL EVERY 6 HOURS PRN
Status: DISCONTINUED | OUTPATIENT
Start: 2019-01-22 | End: 2019-02-13 | Stop reason: HOSPADM

## 2019-01-22 RX ORDER — SODIUM CHLORIDE 0.9 % (FLUSH) 0.9 %
5 SYRINGE (ML) INJECTION
Status: DISCONTINUED | OUTPATIENT
Start: 2019-01-22 | End: 2019-02-13 | Stop reason: HOSPADM

## 2019-01-22 RX ORDER — INSULIN ASPART 100 [IU]/ML
0-5 INJECTION, SOLUTION INTRAVENOUS; SUBCUTANEOUS
Status: DISCONTINUED | OUTPATIENT
Start: 2019-01-22 | End: 2019-02-13 | Stop reason: HOSPADM

## 2019-01-22 RX ORDER — NYSTATIN 100000 [USP'U]/ML
500000 SUSPENSION ORAL 4 TIMES DAILY
Status: DISCONTINUED | OUTPATIENT
Start: 2019-01-23 | End: 2019-01-28

## 2019-01-22 RX ORDER — SODIUM CHLORIDE 9 MG/ML
INJECTION, SOLUTION INTRAVENOUS CONTINUOUS
Status: DISCONTINUED | OUTPATIENT
Start: 2019-01-22 | End: 2019-01-22

## 2019-01-22 RX ORDER — CETIRIZINE HYDROCHLORIDE 10 MG/1
10 TABLET ORAL DAILY
Status: DISCONTINUED | OUTPATIENT
Start: 2019-01-23 | End: 2019-01-28

## 2019-01-22 RX ORDER — CHOLECALCIFEROL (VITAMIN D3) 25 MCG
1000 TABLET ORAL DAILY
Status: DISCONTINUED | OUTPATIENT
Start: 2019-01-23 | End: 2019-01-28

## 2019-01-22 RX ORDER — OXYCODONE HYDROCHLORIDE 5 MG/1
5 TABLET ORAL EVERY 6 HOURS PRN
Status: DISCONTINUED | OUTPATIENT
Start: 2019-01-22 | End: 2019-02-06

## 2019-01-22 RX ORDER — SODIUM CHLORIDE 9 MG/ML
INJECTION, SOLUTION INTRAVENOUS CONTINUOUS
Status: DISCONTINUED | OUTPATIENT
Start: 2019-01-22 | End: 2019-01-23

## 2019-01-22 RX ADMIN — SODIUM CHLORIDE: 0.9 INJECTION, SOLUTION INTRAVENOUS at 10:01

## 2019-01-22 RX ADMIN — METHYLPREDNISOLONE SODIUM SUCCINATE 80 MG: 40 INJECTION, POWDER, FOR SOLUTION INTRAMUSCULAR; INTRAVENOUS at 11:01

## 2019-01-22 RX ADMIN — ENOXAPARIN SODIUM 40 MG: 100 INJECTION SUBCUTANEOUS at 11:01

## 2019-01-22 NOTE — TELEPHONE ENCOUNTER
----- Message from Alex Reyna MD sent at 1/22/2019  4:25 PM CST -----  Set up for admission today - orders are in

## 2019-01-22 NOTE — TELEPHONE ENCOUNTER
Notified direct admission's office of admission on patient given her CPK level and concern for polymyositis associated w/ autoimmune disorder.     Also, contacted CN of Oncology department to make them aware of patient. No beds, currently available, but some will be available shortly.    Contacted patient to make her aware the admission was requested, and that she should report to direct admissions office to begin process of completing documents and wait for bed to become available. She is traveling from [place], so travel time noted. She is to begin heading here now. Direct admission's office location provided, pt voiced understandign.

## 2019-01-23 ENCOUNTER — TELEPHONE (OUTPATIENT)
Dept: HEMATOLOGY/ONCOLOGY | Facility: CLINIC | Age: 59
End: 2019-01-23

## 2019-01-23 LAB
ALBUMIN SERPL BCP-MCNC: 2.6 G/DL
ALDOLASE SERPL-CCNC: 13.6 U/L
ALP SERPL-CCNC: 83 U/L
ALT SERPL W/O P-5'-P-CCNC: 64 U/L
ANA SER QL IF: POSITIVE
ANA TITR SER IF: NORMAL {TITER}
ANION GAP SERPL CALC-SCNC: 11 MMOL/L
AST SERPL-CCNC: 241 U/L
BASOPHILS # BLD AUTO: 0.02 K/UL
BASOPHILS NFR BLD: 0.2 %
BILIRUB SERPL-MCNC: 0.3 MG/DL
BILIRUB UR QL STRIP: NEGATIVE
BUN SERPL-MCNC: 16 MG/DL
CALCIUM SERPL-MCNC: 8.4 MG/DL
CHLORIDE SERPL-SCNC: 100 MMOL/L
CHOLEST SERPL-MCNC: 186 MG/DL
CHOLEST/HDLC SERPL: 3.8 {RATIO}
CK SERPL-CCNC: 3831 U/L
CLARITY UR REFRACT.AUTO: CLEAR
CO2 SERPL-SCNC: 23 MMOL/L
COLOR UR AUTO: COLORLESS
CREAT SERPL-MCNC: 0.7 MG/DL
DIFFERENTIAL METHOD: ABNORMAL
EOSINOPHIL # BLD AUTO: 0.1 K/UL
EOSINOPHIL NFR BLD: 1.5 %
ERYTHROCYTE [DISTWIDTH] IN BLOOD BY AUTOMATED COUNT: 16.4 %
EST. GFR  (AFRICAN AMERICAN): >60 ML/MIN/1.73 M^2
EST. GFR  (NON AFRICAN AMERICAN): >60 ML/MIN/1.73 M^2
GLUCOSE SERPL-MCNC: 132 MG/DL
GLUCOSE UR QL STRIP: NEGATIVE
HCT VFR BLD AUTO: 33.4 %
HDLC SERPL-MCNC: 49 MG/DL
HDLC SERPL: 26.3 %
HGB BLD-MCNC: 10.5 G/DL
HGB UR QL STRIP: NEGATIVE
IMM GRANULOCYTES # BLD AUTO: 0.04 K/UL
IMM GRANULOCYTES NFR BLD AUTO: 0.4 %
KETONES UR QL STRIP: NEGATIVE
LDLC SERPL CALC-MCNC: 123.4 MG/DL
LEUKOCYTE ESTERASE UR QL STRIP: ABNORMAL
LYMPHOCYTES # BLD AUTO: 0.7 K/UL
LYMPHOCYTES NFR BLD: 7.7 %
MAGNESIUM SERPL-MCNC: 1.8 MG/DL
MCH RBC QN AUTO: 26.9 PG
MCHC RBC AUTO-ENTMCNC: 31.4 G/DL
MCV RBC AUTO: 85 FL
MICROSCOPIC COMMENT: NORMAL
MONOCYTES # BLD AUTO: 0.3 K/UL
MONOCYTES NFR BLD: 3.7 %
NEUTROPHILS # BLD AUTO: 7.9 K/UL
NEUTROPHILS NFR BLD: 86.5 %
NITRITE UR QL STRIP: NEGATIVE
NONHDLC SERPL-MCNC: 137 MG/DL
NRBC BLD-RTO: 0 /100 WBC
OSMOLALITY SERPL: 287 MOSM/KG
OSMOLALITY UR: 127 MOSM/KG
PH UR STRIP: 6 [PH] (ref 5–8)
PHOSPHATE SERPL-MCNC: 2.8 MG/DL
PLATELET # BLD AUTO: 209 K/UL
PMV BLD AUTO: 9.5 FL
POTASSIUM SERPL-SCNC: 3.9 MMOL/L
PROT SERPL-MCNC: 6 G/DL
PROT UR QL STRIP: NEGATIVE
RBC # BLD AUTO: 3.91 M/UL
RBC #/AREA URNS AUTO: 2 /HPF (ref 0–4)
SODIUM SERPL-SCNC: 134 MMOL/L
SODIUM UR-SCNC: <20 MMOL/L
SP GR UR STRIP: 1 (ref 1–1.03)
SQUAMOUS #/AREA URNS AUTO: 0 /HPF
TRIGL SERPL-MCNC: 68 MG/DL
URN SPEC COLLECT METH UR: ABNORMAL
WBC # BLD AUTO: 9.11 K/UL
WBC #/AREA URNS AUTO: 3 /HPF (ref 0–5)

## 2019-01-23 PROCEDURE — 25500020 PHARM REV CODE 255: Performed by: INTERNAL MEDICINE

## 2019-01-23 PROCEDURE — 97161 PT EVAL LOW COMPLEX 20 MIN: CPT

## 2019-01-23 PROCEDURE — 85025 COMPLETE CBC W/AUTO DIFF WBC: CPT

## 2019-01-23 PROCEDURE — 80061 LIPID PANEL: CPT

## 2019-01-23 PROCEDURE — 81001 URINALYSIS AUTO W/SCOPE: CPT

## 2019-01-23 PROCEDURE — 84300 ASSAY OF URINE SODIUM: CPT

## 2019-01-23 PROCEDURE — 97530 THERAPEUTIC ACTIVITIES: CPT

## 2019-01-23 PROCEDURE — 83935 ASSAY OF URINE OSMOLALITY: CPT

## 2019-01-23 PROCEDURE — 86060 ANTISTREPTOLYSIN O TITER: CPT

## 2019-01-23 PROCEDURE — A9585 GADOBUTROL INJECTION: HCPCS | Performed by: INTERNAL MEDICINE

## 2019-01-23 PROCEDURE — 63600175 PHARM REV CODE 636 W HCPCS: Performed by: INTERNAL MEDICINE

## 2019-01-23 PROCEDURE — 20600001 HC STEP DOWN PRIVATE ROOM

## 2019-01-23 PROCEDURE — 80053 COMPREHEN METABOLIC PANEL: CPT

## 2019-01-23 PROCEDURE — 25000003 PHARM REV CODE 250: Performed by: INTERNAL MEDICINE

## 2019-01-23 PROCEDURE — 25000003 PHARM REV CODE 250: Performed by: STUDENT IN AN ORGANIZED HEALTH CARE EDUCATION/TRAINING PROGRAM

## 2019-01-23 PROCEDURE — 82550 ASSAY OF CK (CPK): CPT

## 2019-01-23 PROCEDURE — 83735 ASSAY OF MAGNESIUM: CPT

## 2019-01-23 PROCEDURE — 84100 ASSAY OF PHOSPHORUS: CPT

## 2019-01-23 RX ORDER — SODIUM CHLORIDE 9 MG/ML
INJECTION, SOLUTION INTRAVENOUS CONTINUOUS
Status: DISCONTINUED | OUTPATIENT
Start: 2019-01-23 | End: 2019-01-24

## 2019-01-23 RX ORDER — METHYLPREDNISOLONE SOD SUCC 125 MG
125 VIAL (EA) INJECTION 2 TIMES DAILY
Status: DISCONTINUED | OUTPATIENT
Start: 2019-01-24 | End: 2019-01-28

## 2019-01-23 RX ORDER — FAMOTIDINE 20 MG/1
20 TABLET, FILM COATED ORAL 2 TIMES DAILY
Status: DISCONTINUED | OUTPATIENT
Start: 2019-01-23 | End: 2019-01-28

## 2019-01-23 RX ORDER — GADOBUTROL 604.72 MG/ML
9 INJECTION INTRAVENOUS
Status: COMPLETED | OUTPATIENT
Start: 2019-01-23 | End: 2019-01-23

## 2019-01-23 RX ADMIN — SODIUM CHLORIDE: 0.9 INJECTION, SOLUTION INTRAVENOUS at 06:01

## 2019-01-23 RX ADMIN — SODIUM CHLORIDE: 0.9 INJECTION, SOLUTION INTRAVENOUS at 10:01

## 2019-01-23 RX ADMIN — FAMOTIDINE 20 MG: 20 TABLET ORAL at 09:01

## 2019-01-23 RX ADMIN — CALCIUM 500 MG: 500 TABLET ORAL at 09:01

## 2019-01-23 RX ADMIN — CETIRIZINE HYDROCHLORIDE 10 MG: 10 TABLET, FILM COATED ORAL at 09:01

## 2019-01-23 RX ADMIN — NYSTATIN 500000 UNITS: 500000 SUSPENSION ORAL at 09:01

## 2019-01-23 RX ADMIN — NYSTATIN 500000 UNITS: 500000 SUSPENSION ORAL at 12:01

## 2019-01-23 RX ADMIN — METHYLPREDNISOLONE SODIUM SUCCINATE: 1 INJECTION, POWDER, LYOPHILIZED, FOR SOLUTION INTRAMUSCULAR; INTRAVENOUS at 12:01

## 2019-01-23 RX ADMIN — CYANOCOBALAMIN TAB 250 MCG 500 MCG: 250 TAB at 09:01

## 2019-01-23 RX ADMIN — SODIUM CHLORIDE: 0.9 INJECTION, SOLUTION INTRAVENOUS at 03:01

## 2019-01-23 RX ADMIN — AMLODIPINE BESYLATE 10 MG: 10 TABLET ORAL at 09:01

## 2019-01-23 RX ADMIN — GADOBUTROL 9 ML: 604.72 INJECTION INTRAVENOUS at 07:01

## 2019-01-23 RX ADMIN — ENOXAPARIN SODIUM 40 MG: 100 INJECTION SUBCUTANEOUS at 04:01

## 2019-01-23 RX ADMIN — SODIUM CHLORIDE: 0.9 INJECTION, SOLUTION INTRAVENOUS at 02:01

## 2019-01-23 RX ADMIN — VITAMIN D, TAB 1000IU (100/BT) 1000 UNITS: 25 TAB at 09:01

## 2019-01-23 RX ADMIN — NYSTATIN 500000 UNITS: 500000 SUSPENSION ORAL at 04:01

## 2019-01-23 RX ADMIN — ESCITALOPRAM OXALATE 10 MG: 5 TABLET, FILM COATED ORAL at 09:01

## 2019-01-23 NOTE — PROGRESS NOTES
Dr. Isaac notified of increased redness and edema to patient's face. No difficulties swallowing at this time. Awaiting recommendations from Rheumatology. Will continue to monitor.

## 2019-01-23 NOTE — SUBJECTIVE & OBJECTIVE
Past Medical History:   Diagnosis Date    Breast cancer 2017    left    Depression     Diverticulosis     Gastritis     Hypertension     Vitamin B12 deficiency 3/8/2018       Past Surgical History:   Procedure Laterality Date    BIOPSY-SENTINEL NODE Left 2017    [facility] at [facility] OR    BREAST BIOPSY Left     BREAST RECONSTRUCTION Left 2017     SECTION      COLONOSCOPY  2011    repeat in 10 yrs.    D&C      PFVEUTCXF-EEGU-B-CATH Right 10/31/2018    Performed [facility] at [facility] OR 2ND FLR    ALNPNPRLZ-WBUL-I-CATH Right 2017    [facility] at [facility] OR    IDOHMAWLO-LMXF-E-CATH-neck or chest Fluoro needed Consent AM of surgery Right 10/30/2018    Performed [facility] at [facility] OR 2ND FLR    MASTECTOMY Left 2017    MASTECTOMY Left 2017    [facility] Rose [facility] at [facility] OR    MYOMECTOMY      PORTACATH PLACEMENT      RECONSTRUCTION-BREAST/FLAP-SCOTT Left 2017    Per[facility]inder Sewell [facility] at [facility] OR    SENTINEL LYMPH NODE BIOPSY  2017    left    TOTAL REDUCTION MAMMOPLASTY Right 2017       Immunization History   Administered Date(s) Administered    Influenza - Quadrivalent - PF 10/24/2018    Tdap 2018       Review of patient's allergies indicates:  No Known Allergies  Current Facility-Administered Medications   Medication Frequency    0.9%  NaCl infusion Continuous    acetaminophen tablet 650 mg Q4H PRN    ALPRAZolam tablet 0.25 mg TID PRN    amLODIPine tablet 10 mg Daily    calcium carbonate (OS-ESTELA) tablet 500 mg Daily    cetirizine tablet 10 mg Daily    cyanocobalamin tablet 500 mcg Daily    dextrose 50% injection 12.5 g PRN    dextrose 50% injection 25 g PRN    enoxaparin injection 40 mg Daily    escitalopram oxalate tablet 10 mg Daily    famotidine tablet 20 mg BID    fluticasone 50 mcg/actuation nasal spray 50 mcg Daily    glucagon (human  recombinant) injection 1 mg PRN    glucose chewable tablet 16 g PRN    glucose chewable tablet 24 g PRN    insulin aspart U-100 pen 0-5 Units QID (AC + HS) PRN    methylPREDNISolone sodium succinate (SOLU-MEDROL) 1,000 mg in dextrose 5 % 100 mL IVPB Once    nystatin 100,000 unit/mL suspension 500,000 Units QID    ondansetron disintegrating tablet 8 mg Q8H PRN    oxyCODONE immediate release tablet 5 mg Q6H PRN    polyethylene glycol packet 17 g BID PRN    promethazine tablet 12.5 mg Q6H PRN    sodium chloride 0.9% flush 5 mL PRN    vitamin D 1000 units tablet 1,000 Units Daily     Family History     Problem Relation (Age of Onset)    Breast cancer Sister (52)    Heart disease Father    Hypertension Father, Mother    No Known Problems Brother, Son, Brother, Brother, Sister, Daughter    Thyroid disease Daughter        Tobacco Use    Smoking status: Never Smoker    Smokeless tobacco: Never Used   Substance and Sexual Activity    Alcohol use: Yes     Comment: wine    Drug use: No    Sexual activity: Yes     Partners: Male     Birth control/protection: Post-menopausal     Review of Systems   Constitutional: Positive for fatigue. Negative for activity change, appetite change, chills, diaphoresis, fever and unexpected weight change.   HENT: Positive for facial swelling and trouble swallowing. Negative for congestion, ear discharge, hearing loss, postnasal drip, sinus pressure, sneezing, sore throat and tinnitus.    Eyes: Negative for photophobia, pain and redness.   Respiratory: Negative for apnea, cough, choking, chest tightness, shortness of breath and stridor.    Cardiovascular: Negative for chest pain, palpitations and leg swelling.   Gastrointestinal: Negative for abdominal pain, anal bleeding, constipation, diarrhea, nausea and rectal pain.   Endocrine: Negative for polyuria.   Genitourinary: Negative for dysuria and hematuria.   Musculoskeletal: Positive for myalgias. Negative for arthralgias, back  pain, gait problem, joint swelling, neck pain and neck stiffness.   Skin: Positive for rash. Negative for color change and pallor.   Allergic/Immunologic: Negative for immunocompromised state.   Neurological: Negative for dizziness, seizures and numbness.        Dysphagia  Odynophagia   Hematological: Positive for adenopathy. Does not bruise/bleed easily.   Psychiatric/Behavioral: Negative for agitation and behavioral problems.     Objective:     Vital Signs (Most Recent):  Temp: 98.2 °F (36.8 °C) (01/23/19 0721)  Pulse: 102 (01/23/19 0721)  Resp: 16 (01/23/19 0721)  BP: 135/72 (01/23/19 0721)  SpO2: 99 % (01/23/19 0721)  O2 Device (Oxygen Therapy): room air (01/23/19 0721) Vital Signs (24h Range):  Temp:  [98.1 °F (36.7 °C)-98.8 °F (37.1 °C)] 98.2 °F (36.8 °C)  Pulse:  [] 102  Resp:  [16-24] 16  SpO2:  [97 %-100 %] 99 %  BP: (131-141)/(72-85) 135/72     Weight: 85.5 kg (188 lb 8 oz) (01/23/19 0400)  Body mass index is 31.37 kg/m².  Body surface area is 1.98 meters squared.      Intake/Output Summary (Last 24 hours) at 1/23/2019 0944  Last data filed at 1/23/2019 0626  Gross per 24 hour   Intake 2297.5 ml   Output 1850 ml   Net 447.5 ml       Physical Exam   Constitutional: She is oriented to person, place, and time and well-developed, well-nourished, and in no distress. No distress.   HENT:   Head: Normocephalic and atraumatic.   Right Ear: External ear normal.   Left Ear: External ear normal.   Bilateral orbital edema with heliotropic rash    Eyes: Conjunctivae and EOM are normal. Pupils are equal, round, and reactive to light.   Neck: Normal range of motion. Neck supple.   Cardiovascular: Normal rate, regular rhythm, normal heart sounds and intact distal pulses.    Pulmonary/Chest: Effort normal and breath sounds normal. No respiratory distress.   Abdominal: Soft. Bowel sounds are normal.   Neurological: She is alert and oriented to person, place, and time. GCS score is 15.   Skin: Skin is warm and dry. No  rash noted.     Hyperpigmented non raised rash over nape of neck, elbows, knuckles (resemble Gottron's papules), thighs, and ankles.     Thigh rash is red with scaly skin.   Psychiatric: Mood, memory, affect and judgment normal.   Musculoskeletal: Normal range of motion. She exhibits no edema, tenderness or deformity.   No signs of synovitis. ROM intact   2/5 strength of bilateral UE muscles  4/5 of bilateral LE muscles         Significant Labs:  Recent Results (from the past 48 hour(s))   Comprehensive metabolic panel    Collection Time: 01/22/19 11:19 AM   Result Value Ref Range    Sodium 132 (L) 136 - 145 mmol/L    Potassium 4.3 3.5 - 5.1 mmol/L    Chloride 96 95 - 110 mmol/L    CO2 27 23 - 29 mmol/L    Glucose 140 (H) 70 - 110 mg/dL    BUN, Bld 16 6 - 20 mg/dL    Creatinine 0.8 0.5 - 1.4 mg/dL    Calcium 9.3 8.7 - 10.5 mg/dL    Total Protein 7.1 6.0 - 8.4 g/dL    Albumin 3.1 (L) 3.5 - 5.2 g/dL    Total Bilirubin 0.4 0.1 - 1.0 mg/dL    Alkaline Phosphatase 91 55 - 135 U/L     (H) 10 - 40 U/L    ALT 80 (H) 10 - 44 U/L    Anion Gap 9 8 - 16 mmol/L    eGFR if African American >60.0 >60 mL/min/1.73 m^2    eGFR if non African American >60.0 >60 mL/min/1.73 m^2   CK    Collection Time: 01/22/19 11:19 AM   Result Value Ref Range    CPK 4562 (H) 20 - 180 U/L   Hemoglobin A1c    Collection Time: 01/22/19 10:24 PM   Result Value Ref Range    Hemoglobin A1C 6.3 (H) 4.0 - 5.6 %    Estimated Avg Glucose 134 (H) 68 - 131 mg/dL   Comprehensive Metabolic Panel (CMP)    Collection Time: 01/22/19 10:24 PM   Result Value Ref Range    Sodium 130 (L) 136 - 145 mmol/L    Potassium 3.7 3.5 - 5.1 mmol/L    Chloride 96 95 - 110 mmol/L    CO2 26 23 - 29 mmol/L    Glucose 158 (H) 70 - 110 mg/dL    BUN, Bld 20 6 - 20 mg/dL    Creatinine 0.8 0.5 - 1.4 mg/dL    Calcium 8.7 8.7 - 10.5 mg/dL    Total Protein 6.7 6.0 - 8.4 g/dL    Albumin 3.0 (L) 3.5 - 5.2 g/dL    Total Bilirubin 0.4 0.1 - 1.0 mg/dL    Alkaline Phosphatase 96 55 - 135 U/L      (H) 10 - 40 U/L    ALT 73 (H) 10 - 44 U/L    Anion Gap 8 8 - 16 mmol/L    eGFR if African American >60.0 >60 mL/min/1.73 m^2    eGFR if non African American >60.0 >60 mL/min/1.73 m^2   TSH    Collection Time: 01/22/19 10:24 PM   Result Value Ref Range    TSH 3.586 0.400 - 4.000 uIU/mL   Lactic acid, plasma    Collection Time: 01/22/19 10:24 PM   Result Value Ref Range    Lactate (Lactic Acid) 1.2 0.5 - 2.2 mmol/L   CK    Collection Time: 01/22/19 10:24 PM   Result Value Ref Range    CPK 4319 (H) 20 - 180 U/L   PT/INR    Collection Time: 01/22/19 10:24 PM   Result Value Ref Range    Prothrombin Time 10.9 9.0 - 12.5 sec    INR 1.1 0.8 - 1.2   PTT    Collection Time: 01/22/19 10:24 PM   Result Value Ref Range    aPTT 25.2 21.0 - 32.0 sec   Aldolase    Collection Time: 01/22/19 10:24 PM   Result Value Ref Range    Aldolase 13.6 (H) 1.2 - 7.6 U/L   Sedimentation rate, manual    Collection Time: 01/22/19 10:24 PM   Result Value Ref Range    Sed Rate 50 (H) 0 - 36 mm/Hr   C-reactive protein    Collection Time: 01/22/19 10:24 PM   Result Value Ref Range    CRP 31.1 (H) 0.0 - 8.2 mg/L   Osmolality, Serum    Collection Time: 01/22/19 10:24 PM   Result Value Ref Range    Osmolality 287 275 - 295 mOsm/kg   Urinalysis, Reflex to Urine Culture Urine, Clean Catch    Collection Time: 01/23/19  2:55 AM   Result Value Ref Range    Specimen UA Urine, Clean Catch     Color, UA Colorless (A) Yellow, Straw, [place]    Appearance, UA Clear Clear    pH, UA 6.0 5.0 - 8.0    Specific Gravity, UA 1.005 1.005 - 1.030    Protein, UA Negative Negative    Glucose, UA Negative Negative    Ketones, UA Negative Negative    Bilirubin (UA) Negative Negative    Occult Blood UA Negative Negative    Nitrite, UA Negative Negative    Leukocytes, UA Trace (A) Negative   Sodium, urine, random    Collection Time: 01/23/19  2:56 AM   Result Value Ref Range    Sodium Urine Random <20 (A) 20 - 250 mmol/L   Osmolality, urine    Collection Time: 01/23/19   2:56 AM   Result Value Ref Range    Osmolality, Ur 127 50 - 1200 mOsm/kg   Urinalysis Microscopic    Collection Time: 01/23/19  2:56 AM   Result Value Ref Range    RBC, UA 2 0 - 4 /hpf    WBC, UA 3 0 - 5 /hpf    Squam Epithel, UA 0 /hpf    Microscopic Comment SEE COMMENT    Comprehensive Metabolic Panel (CMP)    Collection Time: 01/23/19  3:00 AM   Result Value Ref Range    Sodium 134 (L) 136 - 145 mmol/L    Potassium 3.9 3.5 - 5.1 mmol/L    Chloride 100 95 - 110 mmol/L    CO2 23 23 - 29 mmol/L    Glucose 132 (H) 70 - 110 mg/dL    BUN, Bld 16 6 - 20 mg/dL    Creatinine 0.7 0.5 - 1.4 mg/dL    Calcium 8.4 (L) 8.7 - 10.5 mg/dL    Total Protein 6.0 6.0 - 8.4 g/dL    Albumin 2.6 (L) 3.5 - 5.2 g/dL    Total Bilirubin 0.3 0.1 - 1.0 mg/dL    Alkaline Phosphatase 83 55 - 135 U/L     (H) 10 - 40 U/L    ALT 64 (H) 10 - 44 U/L    Anion Gap 11 8 - 16 mmol/L    eGFR if African American >60.0 >60 mL/min/1.73 m^2    eGFR if non African American >60.0 >60 mL/min/1.73 m^2   Magnesium    Collection Time: 01/23/19  3:00 AM   Result Value Ref Range    Magnesium 1.8 1.6 - 2.6 mg/dL   Phosphorus    Collection Time: 01/23/19  3:00 AM   Result Value Ref Range    Phosphorus 2.8 2.7 - 4.5 mg/dL   CK    Collection Time: 01/23/19  3:00 AM   Result Value Ref Range    CPK 3831 (H) 20 - 180 U/L   Lipid panel    Collection Time: 01/23/19  3:00 AM   Result Value Ref Range    Cholesterol 186 120 - 199 mg/dL    Triglycerides 68 30 - 150 mg/dL    HDL 49 40 - 75 mg/dL    LDL Cholesterol 123.4 63.0 - 159.0 mg/dL    HDL/Chol Ratio 26.3 20.0 - 50.0 %    Total Cholesterol/HDL Ratio 3.8 2.0 - 5.0    Non-HDL Cholesterol 137 mg/dL   CBC auto differential    Collection Time: 01/23/19  3:00 AM   Result Value Ref Range    WBC 9.11 3.90 - 12.70 K/uL    RBC 3.91 (L) 4.00 - 5.40 M/uL    Hemoglobin 10.5 (L) 12.0 - 16.0 g/dL    Hematocrit 33.4 (L) 37.0 - 48.5 %    MCV 85 82 - 98 fL    MCH 26.9 (L) 27.0 - 31.0 pg    MCHC 31.4 (L) 32.0 - 36.0 g/dL    RDW 16.4 (H)  11.5 - 14.5 %    Platelets 209 150 - 350 K/uL    MPV 9.5 9.2 - 12.9 fL    Immature Granulocytes 0.4 0.0 - 0.5 %    Gran # (ANC) 7.9 (H) 1.8 - 7.7 K/uL    Immature Grans (Abs) 0.04 0.00 - 0.04 K/uL    Lymph # 0.7 (L) 1.0 - 4.8 K/uL    Mono # 0.3 0.3 - 1.0 K/uL    Eos # 0.1 0.0 - 0.5 K/uL    Baso # 0.02 0.00 - 0.20 K/uL    nRBC 0 0 /100 WBC    Gran% 86.5 (H) 38.0 - 73.0 %    Lymph% 7.7 (L) 18.0 - 48.0 %    Mono% 3.7 (L) 4.0 - 15.0 %    Eosinophil% 1.5 0.0 - 8.0 %    Basophil% 0.2 0.0 - 1.9 %    Differential Method Automated          Significant Imaging:  Imaging results within the past 24 hours have been reviewed.

## 2019-01-23 NOTE — ASSESSMENT & PLAN NOTE
-appears chronic per labs, check serum Osm, Urine Osm, urine Na. Trend Na, correct for acute changes > 8meq in 24 hours

## 2019-01-23 NOTE — ASSESSMENT & PLAN NOTE
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 Began IV Solumedrol 80mg on admission  -symptomatic management and plan her myositis this admission.

## 2019-01-23 NOTE — ASSESSMENT & PLAN NOTE
-on amlodipine-benazepril; will hold ACEi for now and observe renal function with CPK elevation in rhabdo.

## 2019-01-23 NOTE — PLAN OF CARE
Problem: Adult Inpatient Plan of Care  Goal: Plan of Care Review  Ermelinda Verde is a 58 y.o. female admitted to [facility] on 1/22/19 for Myositis. Ermelinda Verde tolerated evaluation fair today. Primary deficits are UE swelling and weakness, proximally > distally. Pt with difficulty feeding herself and brushing teeth; reached out to oncology resident to place OT evaluation consult. She is able to ambulate ~220 ft in hallway with stand-by assistance, some occasional unsteadiness noted but never requires therapist intervention to prevent a fall. Discussed PT role, POC, goals and recommendations with patient and/or family; verbalized understanding. Ermelinda Verde would benefit from acute PT services to promote mobility during this admission and improve return to PLOF.    Joseph Gomez, PT  1/23/2019

## 2019-01-23 NOTE — ASSESSMENT & PLAN NOTE
"59yo F with history of L sided infiltrating ductal carcinoma of the L breast (dx on Jan 2017), HTN, depression, and gastritis was send from hem/onc clinic for evaluation of possible rhabdomyositis/myositis.     Labs: ESR 50, CRP 31.1, CPK 4562 (trending downward to 3831), Aldolase 13.6.  , ALT 64.  UA normal.  CBC unremarkable.     Patient was started on solumedrol 1g x 1 for possible treatment of myositis.  Slight improvement in symptoms.  Failed PO prednisone - worsening of symptoms    Case reports of docetaxel related inflammatory myositis had been documented. "...taxanes have been noted to cause disabling but transient arthralgia and myalgias; it is important to consider the possibility of inflammatory myopathy as a possible complication in patients undergoing treatment with these agents." - A case of docetaxel induced myositis and review of literature. Austin et al 2015. 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   Dermatomyositis is usually associated with malignancy.        Plan  - Hold paclitaxel agent at this time  - continue to monitor CPK/Aldolase daily  - transition to methylprednisone 125mg BID  - oral thrush - management as per primary  - pending myomarker panel and DARCY  - recommendation for dermatology consult - possible biopsy of rash for evaluation of medication induced (chemotherapy) vs dermatomyositis  - MRI of LUE for evaluation of myositis  - If dermatology unable to differentiate rash and + myositis on MRI - consideration for muscle biopsy by general surgery  "

## 2019-01-23 NOTE — TELEPHONE ENCOUNTER
----- Message from Audrey Munoz sent at 1/23/2019 10:13 AM CST -----  Contact: Karla Dietz   Requesting information about her 1/15 and 1/22 office visits.    Please fax to [phone]

## 2019-01-23 NOTE — ASSESSMENT & PLAN NOTE
-without eosinophilia; suspected to be secondary to recent Nab paclitaxel. Hold offending agents, treatment of myositis

## 2019-01-23 NOTE — PROGRESS NOTES
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  Oncology social worker is involved for psychosocial services.  Patient presents as a 58 y.o. year old  female.    Persons interviewed: Patient and her     Living Situation:     Lives with her , Júnior Verde , their grown daughter and 3 boys that they are taking care of (ages 11, 9 and 6) at  [address] [place] [address] , phone: [phone] (home).      (RETIRED) Functional Status Prior  Ambulation Prior: 0-->independent  Transferrin-->independent  Toiletin-->independent  Bathin-->independent  Dressin-->independent  Eatin-->independent  Communication: understands/communicates without difficulty  Swallowing: swallows foods/liquids without difficulty    Current or Past Agencies and Description of Services/Supplies    DME  None    Home Health  None    IV Infusion  None    Nutrition: Oral    Outpatient Pharmacy:     [facility] [address] [place], [place] [address] [address] [facility]  [address] [facility] [address]  Phone: [phone] Fax: [phone]      Patient Preference of agencies include: None at present    Patient/Caregiver informed of right to choose providers or agencies.  Patient provides permission to release any necessary information to [facility] and to Non[facility] agencies as needed to facilitate patient care, treatment planning, and patient discharge planning.  Written and verbal resources provided.            Adjustment to Diagnosis and Treatment  Appropriate - the patient lives with her , their grown daughter and 3 boys whom they are taking care of (ages 11, 9 and 6). They also have a grown  daughter living close to them. The patient was functioning independently at home at time of her hospitalization. She is on long term disability leave from her work in a [employer]. Her 's schedule is flexible since he is a . The patient and her  stated that they were coping with her illness through their abbi.             History/Current Symptoms of Anxiety/Depression: No  History/Current Substance Use:   Social History     Tobacco Use    Smoking status: Never Smoker    Smokeless tobacco: Never Used   Substance and Sexual Activity    Alcohol use: Yes     Comment: wine    Drug use: No    Sexual activity: Yes     Partners: Male     Birth control/protection: Post-menopausal       Indications of Abuse/Neglect: No      Financial:  Payor/Plan Subscr  Sex Relation Sub. Ins. ID Effective Group Num   1. BLUE CROSS BL* ROMEO PEREZ 1960 Female  [id number] 3/1/16 [id number]                                    [address] [id number]                            Other identified concerns/needs: None at present    Plan:    Interventions/Referrals: Offered services to the patient and her  and provided them with contact information. Will follow as needed for supportive counseling in the absence of her primary Oncology Social Worker, DAVID FerrariW, OSW-C  Patient/caregiver engaged in treatment planning process.     providing psychosocial and supportive counseling, resources, education, assistance and discharge planning as appropriate.  Patient/caregiver state understanding of  available resources,  following, remains available.

## 2019-01-23 NOTE — HPI
59yo F with history of L sided infiltrating ductal carcinoma of the L breast (dx on Jan 2017), HTN, depression, and gastritis was send from hem/onc clinic for evaluation of possible inflammatory myositis.     L breast cancer s/p completion of 4 cycles of demi-adjuvant taxotere and cytoxan (completed on 5/9/17) and mastectomy (6/26/17) and then 4 cycles of adjuvant Adriamycin (completed 9/12/17). In 10/2017 - patient developed L supraclavicular lymphadenopathy which FNA confirmed as reoccurrence of previously treated breast cancer.     Patient started on Atelizumab/Abraxane on 11/7/18. Per chart review, patient noticed rashes on the dorsum of her hands on 11/11/18. Seen in urgent care and given topical steroid cream. Then received two more infusions on Atelizumab/Abraxane on 11/21/18 and 12/5/18. Started to notice puffiness around the eyes on 12/11/18. Received another Abraxane infusion on 12/12/18 but this time with hydrocortisone 50 mg which helped reduce the swelling around the eyes. Developed swelling of the face again on 12/15/18. Next infusion of Abraxane done on 12/19/18 with Solucortef and Atelizumab held. Abraxane given again on 1/3/19 with no IV steroid. Patient developed swelling of the face on 1/4/19 and went to urgent care and given short course of prednisone 20 mg BID. On 1/15/19, patient seen in hem/onc clinic with c/o of pressure and tightness around neck. CT scan of chest and neck did not reveal any vascular compression. Started on prednisone 60 mg with taper. On 1/22/18 patient with c/o proximal muscle weakness. CPK found to be elevated around 4k. Patient admitted and given solumedrol 80 mg IV on 1/22/18. Last infusion of Atelizumab on 12/19/18. Last infusion of Abraxane on 1/3/19. Given Solumedrol 1g x 1 on 1/23/18. Seen by Dermatology on 1/24/18 and biopsy done of skin rash. Given distribution of rashes, there was concern for dermatomyositis. Patient started on Solumedrol 125 mg IV BID at this  time.     Denies any family history of autoimmune diseases.    No smoking, EtOH, recreational drug usage.    Denies any photosensitivity, joint swelling, unintentional weigh loss, abdominal pain, night sweats, CP, SOB.  +oral thrush.

## 2019-01-23 NOTE — PLAN OF CARE
Problem: Adult Inpatient Plan of Care  Goal: Plan of Care Review  Outcome: Ongoing (interventions implemented as appropriate)  Uneventful shift. Pt direct admit.  at bs. Limb alert band placed on LUE. Pt R CW PAC accessed. Cxr completed this shift. IVF infusing as ordered. Pt has swelling to face, neck, and BUE. BLE have dry bruised areas pt states is from chemotherapy. Pt has had no c/o pain this shift. Blood, urine, and strept tests all sent off to lab. Pt has remained free from injury this shift. Bed in low locked position. Call light and personal belongings within reach. Side rails up x2. Nonskid socks in place. Pt instructed to call with any needs. Will continue to monitor.

## 2019-01-23 NOTE — ASSESSMENT & PLAN NOTE
-see myositis. Continue supportive care, Cr/ electrolyte/ CPK monitoring and aggressive hydration. transaminitis on CMP is likely due to skeletal muscle rhabdo, not hepatic

## 2019-01-23 NOTE — HPI
Ms Verde is a 57 yo F with a prior diagnosis of L sided breast cancer, s/p 3 cycles of nab-paclitaxel and atezolizumab who presents from clinic with a roughly week long, multi-day history of proximal muscle weakness in the shoulder girdle muscles, bilateral and associated with mild tenderness. She reports generalized weakness, but strength impairment with the use of her upper extremities more than lower extremities, and her ADLs are intact. Her last PET scan in December 2018 showed almost complete resolution of her adenopathy, and she had been complaining of a rash, which had been attributed to Nab paclitaxel. Last week, 1/15, she had a CT neck/ since there was concern for facial swelling per symptoms, and the CT showed lymphadenopathy without airway or vascular compromise. 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 Most recently, the patient had been on 60mg of prednisone with a taper and had noted swelling, proximal weakness. She also notes some progressive swallowing difficulty, but attributes this to candidal thrush for which she takes nystatin scheduled. She reports poor PO intake preceding admission, dark, mario colored urine, but denies fevers or joint pain.      Onc History per Dr. Reyna:   She developed a palpable abnormality in her left breast in January 2017 which she noted on self-examination.  A diagnostic mammogram on January 19 showed a greater than 1 cm nodule in the upper outer portion of left breast.  By ultrasound this was lobulated and hypoechoic measuring 1.75 x 1.51 x 1.96 cm.     On January 24, 2017 a core needle biopsy was performed which showed infiltrating ductal carcinoma, high grade.  The tumor was ER negative, MD negative, and HER-2 negative.  A follow-up ultrasound on December 6 showed 2.5 x 2.2 x  1.5 cm left breast mass.  There was no abnormality noted in the left axilla.     She underwent sentinel lymph node biopsy on February 22.  That showed 4 negative lymph nodes.     She had 4 cycles of  Wilbur-adjuvantTaxotere and Cytoxan completed  on 5/9/17.     On June 26 she underwent left mastectomy.  That revealed 2 foci of invasive high-grade carcinoma measuring 14 mm and 1.5 mm.  Margins were negative.     She completed 4 cycles of adjuvant Adriamycin on September 12, 2017.     In October, she developed some left supraclavicular lymphadenopathy which turned out to be recurrence.     A fine-needle aspirate of the lymph node was performed on October 19th.  That showed metastatic carcinoma consistent with breast primary which was ER negative, MT negative and HER 2-negative.

## 2019-01-23 NOTE — ASSESSMENT & PLAN NOTE
DDX to include adverse reaction to chemotherapeutic drug vs steroid induced myopathy vs polymyositis vs dermatomyositis   -trend CPK daily, continue hydration 250cc /hr crystalloid initially for renal protection, monitor renal function and electrolytes  -Myositis panel, CRP/ESR, DARCY, TSH, aldolase sent  -discontinue offending agents.   -s/p single dose of IV Solumedrol although initial steroids are in differential for myopathy with myositis and can obscure pathology specimen diagnosis. Consider Rheum consult, muscle biopsy per Gen Surg (will discuss with day team)

## 2019-01-23 NOTE — PT/OT/SLP EVAL
Physical Therapy  Evaluation/Treatment  Ermelinda Verde   [id number]    Time Tracking:     PT Received On: 01/23/19   PT Start Time: 1430   PT Stop Time: 1455   PT Total Time (min): 25 min    Billable Minutes: Evaluation 15 and Therapeutic Activity 10      Recommendations:     Discharge recommendations: Home with HHPT and OT     Equipment recommendations: None    Barriers to Discharge: None    Patient Information:     Recent Surgery: none    Diagnosis: Myositis    General Precautions: Standard, fall  Orthopedic Precautions: None    Assessment:     Ermelinda Verde is a 58 y.o. female admitted to [facility] on 1/22/19 for Myositis. Ermelinda Verde tolerated evaluation fair today. Primary deficits are UE swelling and weakness, proximally > distally. Pt with difficulty feeding herself and brushing teeth; reached out to oncology resident to place OT evaluation consult. She is able to ambulate ~220 ft in hallway with stand-by assistance, some occasional unsteadiness noted but never requires therapist intervention to prevent a fall. Discussed PT role, POC, goals and recommendations with patient and/or family; verbalized understanding. Ermelinda Verde would benefit from acute PT services to promote mobility during this admission and improve return to PLOF.    Problem List: weakness, decreased endurance, impaired self-care skills, impaired mobility, gait instability and impaired cardiopulmonary response to activity    Rehab Prognosis: Good; patient would benefit from acute skilled PT services to address these deficits and reach maximum level of function.    Plan:     Patient to be seen 3 x/week to address the above listed problems via therapeutic activities, therapeutic exercises, neuromuscular re-education, gait training    Plan of Care Expires: 02/22/19  Plan of Care reviewed with: patient, spouse    Subjective:     Communicated with RN prior to evaluation, appropriate to see for evaluation.    Pt found supine in bed (HOB  elevated) upon PT entry to room, agreeable to evaluation.    Does this patient have any cultural, spiritual, Zoroastrianism conflicts given the current situation? Patient has no barriers to learning. Patient verbalizes understanding of his/her program and goals and demonstrates them correctly. No cultural, spiritual, or educational needs identified.    Past Medical History:   Diagnosis Date    Breast cancer 2017    left    Depression     Diverticulosis     Gastritis     Hypertension     Vitamin B12 deficiency 3/8/2018     Past Surgical History:   Procedure Laterality Date    BIOPSY-SENTINEL NODE Left 2017    Performed [facility] Alfredo English [facility] OR    BREAST BIOPSY Left     BREAST RECONSTRUCTION Left 2017     SECTION      COLONOSCOPY  2011    repeat in 10 yrs.    D&C      KDDYOMJQW-ZWZZ-C-CATH Right 10/31/2018    Performed by Deo Palencia [facility] at [facility] OR 2ND FLR    OHVCWGLFB-PIGB-C-CATH Right 2017    [facility] by Alfredo English [facility] at [facility] OR    OPPKUBTOP-ZLCR-K-CATH-neck or chest Fluoro needed Consent AM of surgery Right 10/30/2018    Performed by Deo Palencia MD at [facility] OR 2ND FLR    MASTECTOMY Left 2017    MASTECTOMY Left 2017    Performed by Regina Rose [facility] at [facility] OR    MYOMECTOMY      PORTACATH PLACEMENT      RECONSTRUCTION-BREAST/FLAP-SCOTT Left 2017    Performed by Sukhjinder Sewell MD at [facility] OR    SENTINEL LYMPH NODE BIOPSY  2017    left    TOTAL REDUCTION MAMMOPLASTY Right 2017     Living Environment:  Pt lives with spouse and 3 grandchildren (youngest 6, oldest 10 yo) in a [facility] with 0 MOHIT.    PLOF:  Prior to admission, patient was independent with mobility and ADL's. Reports recent onset of weakness which made feeding herself and brushing teeth difficult but still able to ambulate on her own.    DME:  Patient owns or has access to the following DME: None    Upon discharge, patient will have assistance from spouse.    Objective:      Patient found with: (port IV)    Cognitive Exam:  Patient is oriented to Person, Place, Time and Situation.  Patient follows 100% of single-step commands.    Sensation:   Intact except pt with c/o of numbness tingling in her toes and fingertips which she states is new    Edema:  Moderately edematous at UE, not noted at LE    Upper Extremity Range of Motion:  Right Upper Extremity: WFL passively; able to actively flex/abd shoulder to 80 deg with shoulder hiking noted  Left Upper Extremity: WFL passively; able to actively flex/abd shoulder to 60 deg with shoulder hiking noted    Upper Extremity Strength:  Right Upper Extremity: shoulder flex/abd 2/5, bicep 3/5, tricep 3+/5, wrist/hand 4/5  Left Upper Extremity: shoulder flex/abd 2-/5, bicep 3/5, tricep 3+/5, wrist/hand 4/5    Lower Extremity Range of Motion:  Right Lower Extremity: WFL  Left Lower Extremity: WFL    Lower Extremity Strength:  Right Lower Extremity: hip flexion 3+/5, knee extension 4/5, knee flex 4/5, ankle 5/5  Left Lower Extremity: hip flexion 4-/5, knee extension and flex 4/5, ankle 5/5    Functional Mobility:    · Bed Mobility:  · Supine to Sitting: SBA  · Scooting towards EOB in sitting: SBA    · Transfers:  · Sit to Stand: Supervision from EOB with no AD x 2 trial(s)  · Stand to Sit: Supervision to EOB or chair with no AD x 2 trial(s)    · Toilet Transfer: Supervision on/off toilet with no AD x 1 trial (s)    · Gait:  · 220 feet in hallway with stand-by assistance, some occasional unsteadiness noted but never requires therapist intervention to prevent a fall  · Assist level: Stand-By Assist  · Device: no AD    · Balance:  · Static Sit: Independent at EOB  · Static Stand: Stand-By Assist with no AD; performed her own hand washing at sink x 1 minute after using toilet    Therapeutic Activities and Exercises:  1. Discussed PT role, POC, goals and recommendations with patient and/or family; verbalized understanding.    2. Whiteboard was  updated.    3. Demonstrated ability to get on/off toilet and wash hands at sink with supervision.    4. Discussed need for OT evaluation for UE weakness, pt and family in agreement.    AM-PAC 6 CLICK MOBILITY  Turning over in bed (including adjusting bedclothes, sheets and blankets)?: 4  Sitting down on and standing up from a chair with arms (e.g., wheelchair, bedside commode, etc.): 4  Moving from lying on back to sitting on the side of the bed?: 4  Moving to and from a bed to a chair (including a wheelchair)?: 4  Need to walk in hospital room?: 4  Climbing 3-5 steps with a railing?: 3  Basic Mobility Total Score: 23    Patient was left sitting up in bedside chair with all lines intact, call button in reach and spouse present.    Clinical Decision Making for Evaluation Complexity:  1. Body System(s) Examination: 1-2  2. Clinical Presentation: Evolving  3. Evaluation Complexity: Low    GOALS:   Multidisciplinary Problems     Physical Therapy Goals        Problem: Physical Therapy Goal    Goal Priority Disciplines Outcome Goal Variances Interventions   Physical Therapy Goal     PT, PT/OT      Description:  Goals to be met by: 19     Patient will increase functional independence with mobility by performin. Gait  x 500 feet with Supervision without device - Not met  2. Increased functional strength to 5/5 for BLE - Not met  3. Standing lower extremity exercise program x 15 reps per handout, with supervision - Not met                  Joseph Gomez, PT  2019

## 2019-01-23 NOTE — SUBJECTIVE & OBJECTIVE
Oncology Treatment Plan:   OP BREAST DOCETAXEL Q3W    Medications:  Continuous Infusions:   sodium chloride 0.9% 250 mL/hr at 19 2241     Scheduled Meds:   [START ON 2019] calcium carbonate  500 mg Oral Daily    [START ON 2019] cetirizine  10 mg Oral Daily    [START ON 2019] cyanocobalamin  500 mcg Oral Daily    enoxaparin  40 mg Subcutaneous Daily    [START ON 2019] escitalopram oxalate  10 mg Oral Daily    [START ON 2019] fluticasone  1 spray Each Nare Daily    methylPREDNISolone sodium succinate  80 mg Intravenous Daily    [START ON 2019] nystatin  500,000 Units Oral QID    [START ON 2019] vitamin D  1,000 Units Oral Daily     PRN Meds:acetaminophen, ALPRAZolam, dextrose 50%, dextrose 50%, glucagon (human recombinant), glucose, glucose, insulin aspart U-100, magnesium oxide, magnesium oxide, ondansetron, oxyCODONE, polyethylene glycol, promethazine, sodium chloride 0.9%     Review of patient's allergies indicates:  No Known Allergies     Past Medical History:   Diagnosis Date    Breast cancer 2017    left    Depression     Diverticulosis     Gastritis     Hypertension     Vitamin B12 deficiency 3/8/2018     Past Surgical History:   Procedure Laterality Date    BIOPSY-SENTINEL NODE Left 2017    [facility]    BREAST BIOPSY Left     BREAST RECONSTRUCTION Left 2017     SECTION      COLONOSCOPY  2011    repeat in 10 yrs.    D&C      KQGNTXOVZ-AYEG-B-CATH Right 10/31/2018    Performed by [facility]encia [facility] at [facility] OR 2ND FLR    HHGHMYRBA-YBFB-J-CATH Right 2017    [facility] at [facility] OR    UHNAHMXZQ-WSZC-B-CATH-neck or chest Fluoro needed Consent AM of surgery Right 10/30/2018    Performed by [facility] at [facility] OR 2ND FLR    MASTECTOMY Left 2017    MASTECTOMY Left 2017    [facility]    MYOMECTOMY      [facility]      RECONSTRUCTION-BREAST/FLAP-SCOTT Left 6/26/2017    Per[facility]    SENTINEL LYMPH NODE BIOPSY  02/2017    left    TOTAL REDUCTION MAMMOPLASTY Right 2017     Family History     Problem Relation (Age of Onset)    Breast cancer Sister (52)    Heart disease Father    Hypertension Father, Mother    No Known Problems Brother, Son, Brother, Brother, Sister, Daughter    Thyroid disease Daughter        Tobacco Use    Smoking status: Never Smoker    Smokeless tobacco: Never Used   Substance and Sexual Activity    Alcohol use: Yes     Comment: wine    Drug use: No    Sexual activity: Yes     Partners: Male     Birth control/protection: Post-menopausal       Review of Systems   Constitutional: Positive for activity change, appetite change and fatigue. Negative for chills, diaphoresis, fever and unexpected weight change.   HENT: Positive for facial swelling and trouble swallowing. Negative for congestion, ear discharge, hearing loss, postnasal drip, sinus pressure, sneezing, sore throat and tinnitus.    Eyes: Negative for photophobia, pain and redness.   Respiratory: Negative for apnea, cough, choking, chest tightness, shortness of breath and stridor.    Cardiovascular: Negative for chest pain, palpitations and leg swelling.   Gastrointestinal: Negative for abdominal pain, anal bleeding, constipation, diarrhea, nausea and rectal pain.   Endocrine: Negative for polyuria.   Genitourinary: Negative for dysuria and hematuria.   Musculoskeletal: Positive for myalgias. Negative for arthralgias, back pain, gait problem, joint swelling, neck pain and neck stiffness.   Skin: Positive for rash. Negative for color change and pallor.   Allergic/Immunologic: Negative for immunocompromised state.   Neurological: Negative for dizziness, seizures and numbness.        Dysphagia  Odynophagia   Hematological: Positive for adenopathy. Does not bruise/bleed easily.   Psychiatric/Behavioral:  Negative for agitation and behavioral problems.     Objective:     Vital Signs (Most Recent):  Temp: 98.6 °F (37 °C) (01/22/19 2326)  Pulse: 107 (01/22/19 2326)  Resp: 16 (01/22/19 2326)  BP: 131/80 (01/22/19 2326)  SpO2: 97 % (01/22/19 2326) Vital Signs (24h Range):  Temp:  [98.1 °F (36.7 °C)-98.8 °F (37.1 °C)] 98.6 °F (37 °C)  Pulse:  [107-123] 107  Resp:  [16-24] 16  SpO2:  [97 %-100 %] 97 %  BP: (131-140)/(78-85) 131/80        There is no height or weight on file to calculate BMI.  There is no height or weight on file to calculate BSA.    No intake or output data in the 24 hours ending 01/22/19 2336    Physical Exam   Constitutional: She is oriented to person, place, and time. No distress.   HENT:   Nose: Nose normal.   Mouth/Throat: Oropharynx is clear and moist. No oropharyngeal exudate.   Eyes: EOM are normal. Pupils are equal, round, and reactive to light. Right eye exhibits no discharge. Left eye exhibits no discharge. No scleral icterus.   Neck: Normal range of motion. No JVD present. No tracheal deviation present.   Cardiovascular: Normal rate, regular rhythm, normal heart sounds and intact distal pulses. Exam reveals no gallop and no friction rub.   No murmur heard.  Pulmonary/Chest: Effort normal and breath sounds normal. No respiratory distress. She has no wheezes. She has no rales. She exhibits no tenderness.   Abdominal: Soft. Bowel sounds are normal. She exhibits no distension and no mass. There is no tenderness. There is no rebound and no guarding.   Musculoskeletal: Normal range of motion. She exhibits edema (facial edema, mild ankle edema). She exhibits no tenderness or deformity.   Neurological: She is alert and oriented to person, place, and time. She displays normal reflexes. No cranial nerve deficit or sensory deficit.   DTRs preserved  2/5 shoulder strength on abduction  4/5 extension of forearms strength against resistance  5/5 lower extremity extensors     Skin: Skin is warm and dry.  Capillary refill takes less than 2 seconds. No rash noted. She is not diaphoretic. No erythema. No pallor.   Psychiatric: She has a normal mood and affect.       Significant Labs:   CBC: No results for input(s): WBC, HGB, HCT, PLT in the last 48 hours., CMP:   Recent Labs   Lab 01/22/19  1119 01/22/19  2224   * 130*   K 4.3 3.7   CL 96 96   CO2 27 26   * 158*   BUN 16 20   CREATININE 0.8 0.8   CALCIUM 9.3 8.7   PROT 7.1 6.7   ALBUMIN 3.1* 3.0*   BILITOT 0.4 0.4   ALKPHOS 91 96   * 266*   ALT 80* 73*   ANIONGAP 9 8   EGFRNONAA >60.0 >60.0   , Uric Acid No results for input(s): URICACID in the last 48 hours. and All pertinent labs from the last 24 hours have been reviewed.    Diagnostic Results:  I have reviewed and interpreted all pertinent imaging results/findings within the past 24 hours.

## 2019-01-23 NOTE — PLAN OF CARE
MDRs completed with Dr. Milner and the team. Patient of Dr. Reyna with a history of left breast cancer s/p 3 cycles of nab-paclitaxel and atezolizumab. Patient c/o muscle weakness and tenderness, generalized weakness, and strength impairment. 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 Patient failed outpatient oral steroid treatment. Rheumatology consult ordered by MD. Plans to treat patient with IV steroids; pending any changes from Rheumatology. Patient maintained on IVF 0.9% NaCl infusion 250 mL/hr IV continuous. VSS. Patient is currently afebrile. MD discussed plans with patient and the patient's spouse. Patient and spouse verbalized understanding. CM to continue to follow with the team.     Nicole Carter, RN, BSN, CM  [facility]  Nurse - Med Onc/Gyn Onc  [phone]

## 2019-01-23 NOTE — H&P
[facility]  Hematology/Oncology  H&P    Patient Name: Ermelinda Verde  MRN: [id number]  Admission Date: 1/22/2019  Code Status: Full Code   Attending Provider: Alex Reyna MD  Primary Care Physician: Reta Weeks MD  Principal Problem:Myositis    Subjective:     HPI: Ms Verde is a 59 yo F with a prior diagnosis of L sided breast cancer, s/p 3 cycles of nab-paclitaxel and atezolizumab who presents from clinic with a roughly 2 week long, multi-day history of proximal muscle weakness in the shoulder girdle muscles, bilateral and associated with mild tenderness. She reports generalized weakness, but strength impairment with the use of her upper extremities more than lower extremities, and her ADLs are intact. Her last PET scan in December 2018 showed almost complete resolution of her adenopathy, and she had been complaining of a rash, which had been attributed to Nab paclitaxel. Last week, 1/15, she had a CT neck since there was concern for facial swelling per symptoms, and the CT showed lymphadenopathy without airway or vascular compromise. 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 Most recently, the patient had been on 60mg of prednisone with a taper and had noted swelling, proximal weakness. She also notes some progressive swallowing difficulty, but attributes this to candidal thrush for which she takes nystatin scheduled. She reports poor PO intake preceding admission, dark, mario colored urine, but denies fevers or joint pain.      Onc History per Dr. Reyna:   She developed a palpable abnormality in her left breast in January 2017 which she noted on self-examination.  A diagnostic mammogram on January 19 showed a greater than 1 cm nodule in the upper outer portion of left breast.  By ultrasound this was  lobulated and hypoechoic measuring 1.75 x 1.51 x 1.96 cm.     On January 24, 2017 a core needle biopsy was performed which showed infiltrating ductal carcinoma, high grade.  The tumor was ER negative, UT negative, and HER-2 negative.  A follow-up ultrasound on December 6 showed 2.5 x 2.2 x 1.5 cm left breast mass.  There was no abnormality noted in the left axilla.     She underwent sentinel lymph node biopsy on February 22.  That showed 4 negative lymph nodes.     She had 4 cycles of  Wilbur-adjuvantTaxotere and Cytoxan completed  on 5/9/17.     On June 26 she underwent left mastectomy.  That revealed 2 foci of invasive high-grade carcinoma measuring 14 mm and 1.5 mm.  Margins were negative.     She completed 4 cycles of adjuvant Adriamycin on September 12, 2017.     In October, she developed some left supraclavicular lymphadenopathy which turned out to be recurrence.     A fine-needle aspirate of the lymph node was performed on October 19th.  That showed metastatic carcinoma consistent with breast primary which was ER negative, UT negative and HER 2-negative.            Oncology Treatment Plan:   OP BREAST DOCETAXEL Q3W    Medications:  Continuous Infusions:   sodium chloride 0.9% 250 mL/hr at 01/22/19 2241     Scheduled Meds:   [START ON 1/23/2019] calcium carbonate  500 mg Oral Daily    [START ON 1/23/2019] cetirizine  10 mg Oral Daily    [START ON 1/23/2019] cyanocobalamin  500 mcg Oral Daily    enoxaparin  40 mg Subcutaneous Daily    [START ON 1/23/2019] escitalopram oxalate  10 mg Oral Daily    [START ON 1/23/2019] fluticasone  1 spray Each Nare Daily    methylPREDNISolone sodium succinate  80 mg Intravenous Daily    [START ON 1/23/2019] nystatin  500,000 Units Oral QID    [START ON 1/23/2019] vitamin D  1,000 Units Oral Daily     PRN Meds:acetaminophen, ALPRAZolam, dextrose 50%, dextrose 50%, glucagon (human recombinant), glucose, glucose, insulin aspart U-100, magnesium oxide, magnesium oxide,  ondansetron, oxyCODONE, polyethylene glycol, promethazine, sodium chloride 0.9%     Review of patient's allergies indicates:  No Known Allergies     Past Medical History:   Diagnosis Date    Breast cancer 2017    left    Depression     Diverticulosis     Gastritis     Hypertension     Vitamin B12 deficiency 3/8/2018     Past Surgical History:   Procedure Laterality Date    BIOPSY-SENTINEL NODE Left 2017    [facility]    BREAST BIOPSY Left     BREAST RECONSTRUCTION Left 2017     SECTION      COLONOSCOPY  2011    repeat in 10 yrs.    D&C      [id number]-SZDX-O-CATH Right 10/31/2018    [facility] 2ND FLR    CMQADFKQO-UQMF-S-CATH Right 2017    [facility] at [facility] OR    XEIRZGJJH-ICYW-Y-CATH-neck or chest Fluoro needed Consent AM of surgery Right 10/30/2018    Performed by [facility] at [facility] OR 2ND FLR    MASTECTOMY Left 2017    MASTECTOMY Left 2017    [facility] at [facility] OR    MYOMECTOMY      PORTACATH PLACEMENT      RECONSTRUCTION-BREAST/FLAP-SCOTT Left 2017    Performed by [facility]inder Sewell [facility] at [facility] OR    SENTINEL LYMPH NODE BIOPSY  2017    left    TOTAL REDUCTION MAMMOPLASTY Right 2017     Family History     Problem Relation (Age of Onset)    Breast cancer Sister (52)    Heart disease Father    Hypertension Father, Mother    No Known Problems Brother, Son, Brother, Brother, Sister, Daughter    Thyroid disease Daughter        Tobacco Use    Smoking status: Never Smoker    Smokeless tobacco: Never Used   Substance and Sexual Activity    Alcohol use: Yes     Comment: wine    Drug use: No    Sexual activity: Yes     Partners: Male     Birth control/protection: Post-menopausal       Review of Systems   Constitutional: Positive for activity change, appetite change and fatigue. Negative for chills, diaphoresis, fever and unexpected  weight change.   HENT: Positive for facial swelling and trouble swallowing. Negative for congestion, ear discharge, hearing loss, postnasal drip, sinus pressure, sneezing, sore throat and tinnitus.    Eyes: Negative for photophobia, pain and redness.   Respiratory: Negative for apnea, cough, choking, chest tightness, shortness of breath and stridor.    Cardiovascular: Negative for chest pain, palpitations and leg swelling.   Gastrointestinal: Negative for abdominal pain, anal bleeding, constipation, diarrhea, nausea and rectal pain.   Endocrine: Negative for polyuria.   Genitourinary: Negative for dysuria and hematuria.   Musculoskeletal: Positive for myalgias. Negative for arthralgias, back pain, gait problem, joint swelling, neck pain and neck stiffness.   Skin: Positive for rash. Negative for color change and pallor.   Allergic/Immunologic: Negative for immunocompromised state.   Neurological: Negative for dizziness, seizures and numbness.        Dysphagia  Odynophagia   Hematological: Positive for adenopathy. Does not bruise/bleed easily.   Psychiatric/Behavioral: Negative for agitation and behavioral problems.     Objective:     Vital Signs (Most Recent):  Temp: 98.6 °F (37 °C) (01/22/19 2326)  Pulse: 107 (01/22/19 2326)  Resp: 16 (01/22/19 2326)  BP: 131/80 (01/22/19 2326)  SpO2: 97 % (01/22/19 2326) Vital Signs (24h Range):  Temp:  [98.1 °F (36.7 °C)-98.8 °F (37.1 °C)] 98.6 °F (37 °C)  Pulse:  [107-123] 107  Resp:  [16-24] 16  SpO2:  [97 %-100 %] 97 %  BP: (131-140)/(78-85) 131/80        There is no height or weight on file to calculate BMI.  There is no height or weight on file to calculate BSA.    No intake or output data in the 24 hours ending 01/22/19 2336    Physical Exam   Constitutional: She is oriented to person, place, and time. No distress.   HENT:   Nose: Nose normal.   Mouth/Throat: +thrush, facial swelling  Eyes: EOM are normal. Pupils are equal, round, and reactive to light. Right eye exhibits  no discharge. Left eye exhibits no discharge. No scleral icterus.   Neck: Normal range of motion. No JVD present. No tracheal deviation present.   Cardiovascular: Normal rate, regular rhythm, normal heart sounds and intact distal pulses. Exam reveals no gallop and no friction rub.   No murmur heard.  Pulmonary/Chest: Effort normal and breath sounds normal. No respiratory distress. She has no wheezes. She has no rales. She exhibits no tenderness.   Abdominal: Soft. Bowel sounds are normal. She exhibits no distension and no mass. There is no tenderness. There is no rebound and no guarding.   Musculoskeletal: Normal range of motion. She exhibits edema (facial edema, mild ankle edema). She exhibits no tenderness or deformity.   Neurological: She is alert and oriented to person, place, and time. She displays normal reflexes. No cranial nerve deficit or sensory deficit.   DTRs preserved  2/5 shoulder strength on abduction  4/5 extension of forearms strength against resistance  5/5 lower extremity extensors     Skin: Skin is warm and dry. Capillary refill takes less than 2 seconds. +hyperpigmented confluent rash on back, pretibial areasShe is not diaphoretic. No erythema. No pallor.   Psychiatric: She has a normal mood and affect.       Significant Labs:   CBC: No results for input(s): WBC, HGB, HCT, PLT in the last 48 hours., CMP:   Recent Labs   Lab 01/22/19  1119 01/22/19  2224   * 130*   K 4.3 3.7   CL 96 96   CO2 27 26   * 158*   BUN 16 20   CREATININE 0.8 0.8   CALCIUM 9.3 8.7   PROT 7.1 6.7   ALBUMIN 3.1* 3.0*   BILITOT 0.4 0.4   ALKPHOS 91 96   * 266*   ALT 80* 73*   ANIONGAP 9 8   EGFRNONAA >60.0 >60.0   , Uric Acid No results for input(s): URICACID in the last 48 hours. and All pertinent labs from the last 24 hours have been reviewed.    Diagnostic Results:  I have reviewed and interpreted all pertinent imaging results/findings within the past 24 hours.       Assessment/Plan:     * Myositis     DDX to include adverse reaction to chemotherapeutic drug vs steroid induced myopathy vs polymyositis vs dermatomyositis vs malignancy induced myopathy  -trend CPK daily, continue hydration 250cc /hr crystalloid initially for renal protection, monitor renal function and electrolytes  -Myositis autoimmue panel, CRP/ESR, DARCY, TSH, aldolase sent  -discontinue offending agents.   -s/p single dose of IV Solumedrol 80mg IV although steroids are in differential for myopathy with myositis and can obscure pathology specimen diagnosis. Consider Rheumatology consult, muscle biopsy per Gen Surg (will discuss with day team), possible MRI upper ext for inflammation characterization and biopsy site, EMG studies        Non-traumatic rhabdomyolysis    -see myositis. Continue supportive care, Cr/ electrolyte/ CPK monitoring and aggressive hydration. transaminitis on CMP is likely due to skeletal muscle rhabdo, not hepatic     Candidiasis    -cont Nystatin swish and swallow.      Hyponatremia    -appears chronic per labs, check serum Osm, Urine Osm, urine Na. Trend Na,  Will correct for acute changes > 8meq in 24 hours     Drug rash    -without eosinophilia; suspected to be secondary to recent Nab paclitaxel. Hold offending agents, treatment of myositis   -consider Dermatology consult with skin biopsy     Pre-diabetes    -check a1c, 0-5u SSI while on steroids, add prandial insulin as warranted on steroids for elevations >200     Adjustment disorder with depressed mood    -cont home SSRI     Vitamin B12 deficiency    -cont home B12 supplementation      Personal history of malignant neoplasm of breast    -see extensive Onc history per HPI; patient on Nab paclitaxel with concern for drug-induced myositis/ rhabdo. Began IV Solumedrol 80mg on admission  -symptomatic management and plan her myositis this admission.      Hypertension    -on amlodipine-benazepril; will hold ACEi for now and observe renal function with CPK elevation in rhabdo.           Elvis Lopez MD  Hematology/Oncology  [facility]

## 2019-01-24 PROBLEM — E87.1 HYPONATREMIA: Status: RESOLVED | Noted: 2019-01-22 | Resolved: 2019-01-24

## 2019-01-24 PROBLEM — L30.9 DERMATITIS: Status: ACTIVE | Noted: 2019-01-24

## 2019-01-24 LAB
ALBUMIN SERPL BCP-MCNC: 2.5 G/DL
ALP SERPL-CCNC: 77 U/L
ALT SERPL W/O P-5'-P-CCNC: 57 U/L
ANION GAP SERPL CALC-SCNC: 7 MMOL/L
ANTI SM ANTIBODY: 0.58 EU
ANTI SM/RNP ANTIBODY: 0.62 EU
ANTI-SM INTERPRETATION: NEGATIVE
ANTI-SM/RNP INTERPRETATION: NEGATIVE
ANTI-SSA ANTIBODY: 0.49 EU
ANTI-SSA INTERPRETATION: NEGATIVE
ANTI-SSB ANTIBODY: 0.11 EU
ANTI-SSB INTERPRETATION: NEGATIVE
AST SERPL-CCNC: 190 U/L
BASOPHILS # BLD AUTO: 0.01 K/UL
BASOPHILS NFR BLD: 0.1 %
BILIRUB SERPL-MCNC: 0.3 MG/DL
BUN SERPL-MCNC: 7 MG/DL
CALCIUM SERPL-MCNC: 8.3 MG/DL
CHLORIDE SERPL-SCNC: 107 MMOL/L
CK SERPL-CCNC: 2747 U/L
CO2 SERPL-SCNC: 24 MMOL/L
CREAT SERPL-MCNC: 0.5 MG/DL
DIFFERENTIAL METHOD: ABNORMAL
DSDNA AB SER-ACNC: NORMAL [IU]/ML
EOSINOPHIL # BLD AUTO: 0 K/UL
EOSINOPHIL NFR BLD: 0 %
ERYTHROCYTE [DISTWIDTH] IN BLOOD BY AUTOMATED COUNT: 16.4 %
EST. GFR  (AFRICAN AMERICAN): >60 ML/MIN/1.73 M^2
EST. GFR  (NON AFRICAN AMERICAN): >60 ML/MIN/1.73 M^2
GLUCOSE SERPL-MCNC: 137 MG/DL
HCT VFR BLD AUTO: 30.7 %
HGB BLD-MCNC: 9.8 G/DL
IMM GRANULOCYTES # BLD AUTO: 0.06 K/UL
IMM GRANULOCYTES NFR BLD AUTO: 0.7 %
LYMPHOCYTES # BLD AUTO: 0.7 K/UL
LYMPHOCYTES NFR BLD: 8.2 %
MAGNESIUM SERPL-MCNC: 1.7 MG/DL
MCH RBC QN AUTO: 27.3 PG
MCHC RBC AUTO-ENTMCNC: 31.9 G/DL
MCV RBC AUTO: 86 FL
MONOCYTES # BLD AUTO: 0.6 K/UL
MONOCYTES NFR BLD: 6.2 %
NEUTROPHILS # BLD AUTO: 7.7 K/UL
NEUTROPHILS NFR BLD: 84.8 %
NRBC BLD-RTO: 0 /100 WBC
PHOSPHATE SERPL-MCNC: 2.3 MG/DL
PLATELET # BLD AUTO: 197 K/UL
PMV BLD AUTO: 9.3 FL
POTASSIUM SERPL-SCNC: 3.4 MMOL/L
PROT SERPL-MCNC: 5.8 G/DL
RBC # BLD AUTO: 3.59 M/UL
SODIUM SERPL-SCNC: 138 MMOL/L
WBC # BLD AUTO: 9.03 K/UL

## 2019-01-24 PROCEDURE — 25000003 PHARM REV CODE 250: Performed by: STUDENT IN AN ORGANIZED HEALTH CARE EDUCATION/TRAINING PROGRAM

## 2019-01-24 PROCEDURE — 99232 PR SUBSEQUENT HOSPITAL CARE,LEVL II: ICD-10-PCS | Mod: ,,, | Performed by: INTERNAL MEDICINE

## 2019-01-24 PROCEDURE — 25000003 PHARM REV CODE 250: Performed by: INTERNAL MEDICINE

## 2019-01-24 PROCEDURE — 63600175 PHARM REV CODE 636 W HCPCS: Performed by: INTERNAL MEDICINE

## 2019-01-24 PROCEDURE — 80053 COMPREHEN METABOLIC PANEL: CPT

## 2019-01-24 PROCEDURE — 82550 ASSAY OF CK (CPK): CPT

## 2019-01-24 PROCEDURE — 84100 ASSAY OF PHOSPHORUS: CPT

## 2019-01-24 PROCEDURE — 82085 ASSAY OF ALDOLASE: CPT

## 2019-01-24 PROCEDURE — 83735 ASSAY OF MAGNESIUM: CPT

## 2019-01-24 PROCEDURE — 85025 COMPLETE CBC W/AUTO DIFF WBC: CPT

## 2019-01-24 PROCEDURE — 88305 TISSUE EXAM BY PATHOLOGIST: CPT | Performed by: PATHOLOGY

## 2019-01-24 PROCEDURE — 20600001 HC STEP DOWN PRIVATE ROOM

## 2019-01-24 PROCEDURE — 88305 TISSUE SPECIMEN TO PATHOLOGY: ICD-10-PCS | Mod: 26,,, | Performed by: PATHOLOGY

## 2019-01-24 PROCEDURE — 99232 SBSQ HOSP IP/OBS MODERATE 35: CPT | Mod: ,,, | Performed by: INTERNAL MEDICINE

## 2019-01-24 PROCEDURE — 63600175 PHARM REV CODE 636 W HCPCS: Performed by: STUDENT IN AN ORGANIZED HEALTH CARE EDUCATION/TRAINING PROGRAM

## 2019-01-24 RX ORDER — SODIUM,POTASSIUM PHOSPHATES 280-250MG
2 POWDER IN PACKET (EA) ORAL EVERY 4 HOURS
Status: COMPLETED | OUTPATIENT
Start: 2019-01-24 | End: 2019-01-24

## 2019-01-24 RX ORDER — POTASSIUM CHLORIDE 20 MEQ/1
20 TABLET, EXTENDED RELEASE ORAL ONCE
Status: DISCONTINUED | OUTPATIENT
Start: 2019-01-24 | End: 2019-01-24

## 2019-01-24 RX ORDER — SODIUM CHLORIDE 9 MG/ML
INJECTION, SOLUTION INTRAVENOUS CONTINUOUS
Status: ACTIVE | OUTPATIENT
Start: 2019-01-24 | End: 2019-01-25

## 2019-01-24 RX ADMIN — CETIRIZINE HYDROCHLORIDE 10 MG: 10 TABLET, FILM COATED ORAL at 09:01

## 2019-01-24 RX ADMIN — NYSTATIN 500000 UNITS: 500000 SUSPENSION ORAL at 09:01

## 2019-01-24 RX ADMIN — FAMOTIDINE 20 MG: 20 TABLET ORAL at 09:01

## 2019-01-24 RX ADMIN — CALCIUM 500 MG: 500 TABLET ORAL at 09:01

## 2019-01-24 RX ADMIN — POTASSIUM & SODIUM PHOSPHATES POWDER PACK 280-160-250 MG 2 PACKET: 280-160-250 PACK at 09:01

## 2019-01-24 RX ADMIN — POTASSIUM & SODIUM PHOSPHATES POWDER PACK 280-160-250 MG 2 PACKET: 280-160-250 PACK at 02:01

## 2019-01-24 RX ADMIN — SODIUM CHLORIDE: 0.9 INJECTION, SOLUTION INTRAVENOUS at 10:01

## 2019-01-24 RX ADMIN — NYSTATIN 500000 UNITS: 500000 SUSPENSION ORAL at 05:01

## 2019-01-24 RX ADMIN — ESCITALOPRAM OXALATE 10 MG: 5 TABLET, FILM COATED ORAL at 09:01

## 2019-01-24 RX ADMIN — VITAMIN D, TAB 1000IU (100/BT) 1000 UNITS: 25 TAB at 09:01

## 2019-01-24 RX ADMIN — AMLODIPINE BESYLATE 10 MG: 10 TABLET ORAL at 09:01

## 2019-01-24 RX ADMIN — ENOXAPARIN SODIUM 40 MG: 100 INJECTION SUBCUTANEOUS at 05:01

## 2019-01-24 RX ADMIN — METHYLPREDNISOLONE SODIUM SUCCINATE 125 MG: 125 INJECTION, POWDER, FOR SOLUTION INTRAMUSCULAR; INTRAVENOUS at 09:01

## 2019-01-24 RX ADMIN — SODIUM CHLORIDE: 0.9 INJECTION, SOLUTION INTRAVENOUS at 08:01

## 2019-01-24 RX ADMIN — CYANOCOBALAMIN TAB 250 MCG 500 MCG: 250 TAB at 09:01

## 2019-01-24 RX ADMIN — NYSTATIN 500000 UNITS: 500000 SUSPENSION ORAL at 02:01

## 2019-01-24 NOTE — PROGRESS NOTES
[facility]  Hematology/Oncology  Progress Note    Patient Name: Ermelinda Verde  Admission Date: 1/22/2019  Hospital Length of Stay: 2 days  Code Status: Full Code     Subjective:     HPI:  Ms Verde is a 59 yo F with a prior diagnosis of L sided breast cancer, s/p 3 cycles of nab-paclitaxel and atezolizumab who presents from clinic with a roughly week long, multi-day history of proximal muscle weakness in the shoulder girdle muscles, bilateral and associated with mild tenderness. She reports generalized weakness, but strength impairment with the use of her upper extremities more than lower extremities, and her ADLs are intact. Her last PET scan in December 2018 showed almost complete resolution of her adenopathy, and she had been complaining of a rash, which had been attributed to Nab paclitaxel. Last week, 1/15, she had a CT neck/ since there was concern for facial swelling per symptoms, and the CT showed lymphadenopathy without airway or vascular compromise. 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 Most recently, the patient had been on 60mg of prednisone with a taper and had noted swelling, proximal weakness. She also notes some progressive swallowing difficulty, but attributes this to candidal thrush for which she takes nystatin scheduled. She reports poor PO intake preceding admission, dark, mario colored urine, but denies fevers or joint pain.      Onc History per Dr. Reyna:   She developed a palpable abnormality in her left breast in January 2017 which she noted on self-examination.  A diagnostic mammogram on January 19 showed a greater than 1 cm nodule in the upper outer portion of left breast.  By ultrasound this was lobulated and hypoechoic measuring 1.75 x 1.51 x 1.96 cm.     On January 24,  "2017 a core needle biopsy was performed which showed infiltrating ductal carcinoma, high grade.  The tumor was ER negative, GA negative, and HER-2 negative.  A follow-up ultrasound on December 6 showed 2.5 x 2.2 x 1.5 cm left breast mass.  There was no abnormality noted in the left axilla.     She underwent sentinel lymph node biopsy on February 22.  That showed 4 negative lymph nodes.     She had 4 cycles of  Wilbur-adjuvantTaxotere and Cytoxan completed  on 5/9/17.     On June 26 she underwent left mastectomy.  That revealed 2 foci of invasive high-grade carcinoma measuring 14 mm and 1.5 mm.  Margins were negative.     She completed 4 cycles of adjuvant Adriamycin on September 12, 2017.     In October, she developed some left supraclavicular lymphadenopathy which turned out to be recurrence.     A fine-needle aspirate of the lymph node was performed on October 19th.  That showed metastatic carcinoma consistent with breast primary which was ER negative, GA negative and HER 2-negative.           Interval History: NAEON. Patient has no acute complaints. Feeling stronger than day prior but still experiencing some weakness in proximal upper extremities, described as "heaviness"      Oncology Treatment Plan:   OP BREAST DOCETAXEL Q3W    Medications:  Continuous Infusions:   sodium chloride 0.9% 125 mL/hr at 01/24/19 1052     Scheduled Meds:   amLODIPine  10 mg Oral Daily    calcium carbonate  500 mg Oral Daily    cetirizine  10 mg Oral Daily    cyanocobalamin  500 mcg Oral Daily    enoxaparin  40 mg Subcutaneous Daily    escitalopram oxalate  10 mg Oral Daily    famotidine  20 mg Oral BID    fluticasone  1 spray Each Nare Daily    methylPREDNISolone sodium succinate  125 mg Intravenous BID    nystatin  500,000 Units Oral QID    potassium, sodium phosphates  2 packet Oral Q4H    vitamin D  1,000 Units Oral Daily     PRN Meds:acetaminophen, ALPRAZolam, dextrose 50%, dextrose 50%, glucagon (human recombinant), " glucose, glucose, insulin aspart U-100, ondansetron, oxyCODONE, polyethylene glycol, promethazine, sodium chloride 0.9%     Review of Systems   Constitutional: Positive for fatigue. Negative for activity change, appetite change, chills, diaphoresis, fever and unexpected weight change.   HENT: Positive for facial swelling. Negative for congestion, ear discharge, hearing loss, postnasal drip, sinus pressure, sneezing, sore throat and tinnitus.    Eyes: Negative for photophobia, pain and redness.   Respiratory: Negative for apnea, cough, choking, chest tightness, shortness of breath and stridor.    Cardiovascular: Negative for chest pain, palpitations and leg swelling.   Gastrointestinal: Negative for abdominal pain, anal bleeding, constipation, diarrhea, nausea and rectal pain.   Endocrine: Negative for polyuria.   Genitourinary: Negative for dysuria and hematuria.   Musculoskeletal: Positive for myalgias. Negative for arthralgias, back pain, gait problem, joint swelling, neck pain and neck stiffness.   Skin: Positive for rash. Negative for color change and pallor.   Allergic/Immunologic: Negative for immunocompromised state.   Neurological: Negative for dizziness, seizures and numbness.   Hematological: Positive for adenopathy. Does not bruise/bleed easily.   Psychiatric/Behavioral: Negative for agitation and behavioral problems.     Objective:     Vital Signs (Most Recent):  Temp: 97.9 °F (36.6 °C) (01/24/19 0800)  Pulse: 106 (01/24/19 0800)  Resp: 19 (01/24/19 0800)  BP: 133/70 (01/24/19 0800)  SpO2: 98 % (01/24/19 0800) Vital Signs (24h Range):  Temp:  [97.5 °F (36.4 °C)-97.9 °F (36.6 °C)] 97.9 °F (36.6 °C)  Pulse:  [] 106  Resp:  [18-19] 19  SpO2:  [95 %-100 %] 98 %  BP: (133-185)/(70-86) 133/70     Weight: 88.7 kg (195 lb 9.6 oz)  Body mass index is 32.55 kg/m².  Body surface area is 2.02 meters squared.      Intake/Output Summary (Last 24 hours) at 1/24/2019 1112  Last data filed at 1/24/2019 1026  Gross  per 24 hour   Intake 6861.67 ml   Output 3900 ml   Net 2961.67 ml       Physical Exam   Constitutional: She is oriented to person, place, and time. She appears well-developed and well-nourished. No distress.   HENT:   Head: Atraumatic.   Nose: Nose normal.   Mouth/Throat: Oropharyngeal exudate (white tongue exudate) present.   Face appears swollen   Eyes: Conjunctivae and EOM are normal. Pupils are equal, round, and reactive to light. Right eye exhibits no discharge. Left eye exhibits no discharge. No scleral icterus.   Neck: Normal range of motion. Neck supple. No tracheal deviation present.   Cardiovascular: Normal rate, regular rhythm and intact distal pulses. Exam reveals no gallop and no friction rub.   Murmur (systolic murmur appreciated over left costal margin) heard.  Pulmonary/Chest: Effort normal and breath sounds normal. No respiratory distress. She has no wheezes. She has no rales. She exhibits no tenderness.   Abdominal: Soft. Bowel sounds are normal. She exhibits no distension and no mass. There is no tenderness. There is no rebound and no guarding.   Musculoskeletal: Normal range of motion. She exhibits edema (facial edema, upper extremity edema). She exhibits no tenderness or deformity.   Neurological: She is alert and oriented to person, place, and time. No cranial nerve deficit or sensory deficit.     3/5 shoulder strength on abduction; range of motion limited by weakness  5/5 extension of forearms strength against resistance  5/5 lower extremity extensors     Skin: Skin is warm and dry. Capillary refill takes less than 2 seconds. No rash noted. She is not diaphoretic. No erythema. No pallor.   Psychiatric: She has a normal mood and affect.       Significant Labs:   CBC:   Recent Labs   Lab 01/23/19  0300 01/24/19  0340   WBC 9.11 9.03   HGB 10.5* 9.8*   HCT 33.4* 30.7*    197    and CMP:   Recent Labs   Lab 01/22/19  2224 01/23/19  0300 01/24/19  0340   * 134* 138   K 3.7 3.9 3.4*    CL 96 100 107   CO2 26 23 24   * 132* 137*   BUN 20 16 7   CREATININE 0.8 0.7 0.5   CALCIUM 8.7 8.4* 8.3*   PROT 6.7 6.0 5.8*   ALBUMIN 3.0* 2.6* 2.5*   BILITOT 0.4 0.3 0.3   ALKPHOS 96 83 77   * 241* 190*   ALT 73* 64* 57*   ANIONGAP 8 11 7*   EGFRNONAA >60.0 >60.0 >60.0       Diagnostic Results:  I have reviewed and interpreted all pertinent imaging results/findings within the past 24 hours.   MRI Humerus W WO Contrast Left   Final Result   Abnormal      1. Diffuse edema and enhancement of the visualized left upper extremity musculature, most pronounced proximally, most notably of the deltoid and biceps musculature as detailed above.  Findings are nonspecific although could relate to a nonspecific myelitis particularly in light of patient's reported history.  Clinical correlation with appropriate history and lab markers advised.   2. No evidence of abscess or osteomyelitis.   This report was flagged in Epic as abnormal.         Electronically signed by: Lynn Amato MD   Date:    01/23/2019   Time:    23:17      Chest X-ray, PA And Lateral   Final Result   Abnormal      Port catheter with its tip directed toward the innominate vein.  Significant dextroscoliosis.      Surgical staple overlies the left upper quadrant appears new compared to prior.  This is an uncertain location on this single projection.      This report was flagged in Epic as abnormal.         Electronically signed by: Raghu Fung MD   Date:    01/23/2019   Time:    07:38            Assessment/Plan:     * Myositis    - DDX to include adverse reaction to chemotherapeutic drug vs steroid induced myopathy vs polymyositis vs dermatomyositis   -trend CPK daily, continue hydration 125ml /hr crystalloid initially for renal protection, monitor renal function and electrolytes  -CPK trending down  -myositis labs suspicious for myosits; rheum on board. Derm on board for skin biopsy.  -MRI left humerus suspicious for myositis  -per rheum  recess, methylprednisolone 125 mg IV BID     Candidiasis    -cont Nystatin swish and swallow.      Non-traumatic rhabdomyolysis    -see myositis. Continue supportive care, Cr/ electrolyte/ CPK monitoring and aggressive hydration. transaminitis on CMP is likely due to skeletal muscle rhabdo, not hepatic     Drug rash    -without eosinophilia; suspected to be secondary to recent Nab paclitaxel. Hold offending agents, treatment of myositis      Pre-diabetes    -check a1c, 0-5u SSI while on steroids, add prandial insulin as warranted.      Adjustment disorder with depressed mood    -cont home SSRI     Vitamin B12 deficiency    -cont home B12 supplementation      Hypertension    -on home amlodipine-benazepril; will hold ACEi for now and observe renal function with CPK elevation in rhabdo.   -restarted amlodipine 10 mg qday              Som Mills MD  Hematology/Oncology  [facility]

## 2019-01-24 NOTE — ASSESSMENT & PLAN NOTE
-on home amlodipine-benazepril; will hold ACEi for now and observe renal function with CPK elevation in rhabdo.   -restarted amlodipine 10 mg qday

## 2019-01-24 NOTE — PLAN OF CARE
Problem: Adult Inpatient Plan of Care  Goal: Plan of Care Review  Outcome: Ongoing (interventions implemented as appropriate)  Uneventful shift. Pt has had no c/o pain this shift.  at bs. BUE +2/+3 edema. BUE elevated on pillows. IVF infusing. Pt has remained free from injury this shift. Bed in low locked position. Call light and personal belongings within reach. Side rails up x2. Nonskid socks in place. Pt instructed to call with any needs. Will continue to monitor.

## 2019-01-24 NOTE — ASSESSMENT & PLAN NOTE
Patient is a 58 year old woman with known intraductal carcinoma of the breast with new onset muscle weakness and rash. Rash originally attributed to chemotherapy, but the new progressive muscle weakness raised the concern for dermatomyositis. Patient's rash (heliotrope rash of the eyes, poikiloderma and plaques of the upper back/thighs/arms, vilolacous papules on the hands), muscle weakness, labs (+DARCY, elevated CK, elevated aldolase), and known underlying malignancy are all consistent with diagnosis of dermatomyositis. Unlikely a rash from chemotherapy.   - Rheumatology following and managing.   - Continue steroids per rheum  - Further antibody studies pending (Anti-Meg)  - Biopsy done today. No sutures in place. Keep area covered with ointment and a band aid until it heals.     Punch biopsy procedure note:  Punch biopsy performed after verbal consent obtained. Area marked and prepped with alcohol. Approximately 1cc of 1% lidocaine with epinephrine injected. 4 mm disposable punch used to remove lesion. Hemostasis obtained and biopsy site closed with gel foam. Wound care instructions reviewed with patient.

## 2019-01-24 NOTE — CONSULTS
"[facility]  Dermatology  Consult Note    Patient Name: Ermelinda Verde  MRN: [id number]  Admission Date: 1/22/2019  Hospital Length of Stay: 2 days  Attending Physician: Ailyn Milner MD  Primary Care Provider: Reta Weeks MD     Inpatient consult to Dermatology  Consult performed by: Yeni Blackwell MD  Consult ordered by: Sarah Oakes MD        Subjective:     Principal Problem:Myositis    HPI:  59yo F with history of L sided infiltrating ductal carcinoma of the L breast (dx on Jan 2017) admitted for evaluation of possible rhabdomyositis/myositis.     Patient had been complaining of proximal muscle weakness in the shoulder and bilateral UE and LE (UE>LE) x 2 weeks.  Associated symptoms include tenderness and rash which started in Nov and was originally attributed to chemotherapy treatments (Nab paclitaxel).  Last week (1/15), patient complaining of facial/neck swelling.  CT showed lymphadenopathy without airway or vascular compromise.  Patient was started on high dose prednisone (60mg x 2, 40x2, 10mgx1) on 1/16 with no improvement of symptoms.  Lab work performed showed elevation of CPK (~4K) with AST elevation.  Patient was subsequently admitted with concern for medication (chemotherapy) induced myositis.  Associated symptoms include rash (that developed after initiation of 1st round of chemotherapy).  Rash was at the nape of the neck, bilateral elbows, knuckles, thighs, and ankle region.  Rash was red, scaly, and pruritic, and had resolved significantly after last chemotherapy treatment (1/3).  Currently complaining of bilateral UE (shoulder to elbow) weakness - feels "heavy".  Denies any family history of autoimmune diseases. Denies any photosensitivity, joint swelling, unintentional weigh loss, abdominal pain, night sweats, CP, SOB.      Dermatology consulted for evaluation of rash with concern for possible dermatomyositis vs drug rash from chemotherapy.         Past Medical " History:   Diagnosis Date    Breast cancer 2017    left    Depression     Diverticulosis     Gastritis     Hypertension     Vitamin B12 deficiency 3/8/2018       Past Surgical History:   Procedure Laterality Date    BIOPSY-SENTINEL NODE Left 2017    [facility]    BREAST BIOPSY Left     BREAST RECONSTRUCTION Left 2017     SECTION      COLONOSCOPY  2011    repeat in 10 yrs.    D&C      XODLIEPEF-DCDP-Z-CATH Right 10/31/2018    [facility]formed [facility] 2ND FLR    JLNDCHUVR-XCHA-O-CATH Right 2017    [facility]    IBOEBMEWK-KZBX-C-CATH-neck or chest Fluoro needed Consent AM of surgery Right 10/30/2018    Performed [facility] at [facility] 2ND FLR    MASTECTOMY Left 2017    MASTECTOMY Left 2017    [facility]    MYOMECTOMY      PORTACATH PLACEMENT      RECONSTRUCTION-BREAST/FLAP-SCOTT Left 2017    Per[facility]inder [facility]ell [facility] at [facility] OR    SENTINEL LYMPH NODE BIOPSY  2017    left    TOTAL REDUCTION MAMMOPLASTY Right 2017     Family History     Problem Relation (Age of Onset)    Breast cancer Sister (52)    Heart disease Father    Hypertension Father, Mother    No Known Problems Brother, Son, Brother, Brother, Sister, Daughter    Thyroid disease Daughter        Tobacco Use    Smoking status: Never Smoker    Smokeless tobacco: Never Used   Substance and Sexual Activity    Alcohol use: Yes     Comment: wine    Drug use: No    Sexual activity: Yes     Partners: Male     Birth control/protection: Post-menopausal       Review of patient's allergies indicates:  No Known Allergies    Medications:  Continuous Infusions:   sodium chloride 0.9%       Scheduled Meds:   amLODIPine  10 mg Oral Daily    calcium carbonate  500 mg Oral Daily    cetirizine  10 mg Oral Daily    cyanocobalamin  500 mcg Oral Daily    enoxaparin  40 mg  Subcutaneous Daily    escitalopram oxalate  10 mg Oral Daily    famotidine  20 mg Oral BID    fluticasone  1 spray Each Nare Daily    methylPREDNISolone sodium succinate  125 mg Intravenous BID    nystatin  500,000 Units Oral QID    potassium, sodium phosphates  2 packet Oral Q4H    vitamin D  1,000 Units Oral Daily     PRN Meds:acetaminophen, ALPRAZolam, dextrose 50%, dextrose 50%, glucagon (human recombinant), glucose, glucose, insulin aspart U-100, ondansetron, oxyCODONE, polyethylene glycol, promethazine, sodium chloride 0.9%    Review of Systems   Constitutional: Negative for weight loss.   HENT: Positive for mouth sores ( thrush) and trouble swallowing ( difficulty chewing/swallowing).    Eyes: Negative for eye irritation.   Musculoskeletal: Positive for muscle weakness ( difficulty lifting arms).   Skin: Positive for itching and rash.     Objective:     Vital Signs (Most Recent):  Temp: 97.9 °F (36.6 °C) (01/24/19 0800)  Pulse: 106 (01/24/19 0800)  Resp: 19 (01/24/19 0800)  BP: 133/70 (01/24/19 0800)  SpO2: 98 % (01/24/19 0800) Vital Signs (24h Range):  Temp:  [97.5 °F (36.4 °C)-97.9 °F (36.6 °C)] 97.9 °F (36.6 °C)  Pulse:  [] 106  Resp:  [18-19] 19  SpO2:  [95 %-100 %] 98 %  BP: (133-185)/(70-86) 133/70     Weight: 88.7 kg (195 lb 9.6 oz)  Body mass index is 32.55 kg/m².    Physical Exam   Constitutional: She appears well-developed and well-nourished.   Neurological: She is alert and oriented to person, place, and time.   Psychiatric: She has a normal mood and affect.   Skin:   Areas Examined (abnormalities noted in diagram):   Head / Face Inspection Performed  Neck Inspection Performed  Chest / Axilla Inspection Performed  Abdomen Inspection Performed  Back Inspection Performed  RUE Inspected  LUE Inspection Performed  RLE Inspected  LLE Inspection Performed  Nails and Digits Inspection Performed                               Significant Labs:   Recent Lab Results       01/24/19  2560         Immature Granulocytes 0.7     Immature Grans (Abs) 0.06  Comment:  Mild elevation in immature granulocytes is non specific and   can be seen in a variety of conditions including stress response,   acute inflammation, trauma and pregnancy. Correlation with other   laboratory and clinical findings is essential.       Albumin 2.5     Alkaline Phosphatase 77     ALT 57     Anion Gap 7          Baso # 0.01     Basophil% 0.1     Total Bilirubin 0.3  Comment:  For infants and newborns, interpretation of results should be based  on gestational age, weight and in agreement with clinical  observations.  Premature Infant recommended reference ranges:  Up to 24 hours.............<8.0 mg/dL  Up to 48 hours............<12.0 mg/dL  3-5 days..................<15.0 mg/dL  6-29 days.................<15.0 mg/dL       BUN, Bld 7     Calcium 8.3     Chloride 107     CO2 24     CPK 2747     Creatinine 0.5     Differential Method Automated     eGFR if African American >60.0     eGFR if non  >60.0  Comment:  Calculation used to obtain the estimated glomerular filtration  rate (eGFR) is the CKD-EPI equation.        Eos # 0.0     Eosinophil% 0.0     Glucose 137     Gran # (ANC) 7.7     Gran% 84.8     Hematocrit 30.7     Hemoglobin 9.8     Lymph # 0.7     Lymph% 8.2     Magnesium 1.7     MCH 27.3     MCHC 31.9     MCV 86     Mono # 0.6     Mono% 6.2     MPV 9.3     nRBC 0     Phosphorus 2.3     Platelets 197     Potassium 3.4     Total Protein 5.8     RBC 3.59     RDW 16.4     Sodium 138     WBC 9.03           Significant Imaging: I have reviewed all pertinent imaging results/findings within the past 24 hours.    Assessment/Plan:     Dermatitis    Patient is a 58 year old woman with known intraductal carcinoma of the breast with new onset muscle weakness and rash. Rash originally attributed to chemotherapy, but the new progressive muscle weakness raised the concern for dermatomyositis. Patient's rash (heliotrope rash of  the eyes, poikiloderma and plaques of the upper back/thighs/arms, vilolacous papules on the hands), muscle weakness, labs (+DARCY, elevated CK, elevated aldolase), and known underlying malignancy are all consistent with diagnosis of dermatomyositis. Unlikely a rash from chemotherapy.   - Rheumatology following and managing.   - Continue steroids per rheum  - Further antibody studies pending (Anti-Meg)  - Biopsy done today. No sutures in place. Keep area covered with ointment and a band aid until it heals.     Punch biopsy procedure note:  Punch biopsy performed after verbal consent obtained. Area marked and prepped with alcohol. Approximately 1cc of 1% lidocaine with epinephrine injected. 4 mm disposable punch used to remove lesion. Hemostasis obtained and biopsy site closed with gel foam. Wound care instructions reviewed with patient.             Thank you for your consult. I will follow-up with patient. Please contact us if you have any additional questions.    Ayah Blackwell MD  Dermatology  [facility]

## 2019-01-24 NOTE — CONSULTS
"[facility]  Rheumatology  Consult Note    Patient Name: Ermelinda Verde  MRN: [id number]  Admission Date: 1/22/2019  Hospital Length of Stay: 1 days  Code Status: Full Code   Attending Provider: Alex Reyna MD  Primary Care Physician: Reta Weeks MD  Principal Problem:Myositis    Consults  Subjective:     HPI: 57yo F with history of L sided infiltrating ductal carcinoma of the L breast (dx on Jan 2017), HTN, depression, and gastritis was send from hem/onc clinic for evaluation of possible rhabdomyositis/myositis.    Patient had been complaining of proximal muscle weakness in the shoulder and bilateral UE and LE (UE>LE) x 2 weeks.  Associated symptoms include tenderness and rash (which she attributed to Nab paclitaxel).  Last week (1/15), patient complaining of facial/neck swelling.  CT showed lymphadenopathy without airway or vascular compromise.  Patient was started on high dose prednisone (60mg x 2, 40x2, 10mgx1) on 1/16 with no improvement of symptoms.  Lab work performed showed elevation of CPK (~4K) with AST elevation.  Patient was subsequently admitted with concern for medication (chemotherapy) induced myositis.  Associated symptoms include rash (that developed after initiation of 1st round of chemotherapy).  Rash was at the nape of the neck, bilateral elbows, knuckles, thighs, and ankle region.  Rash was red, scaly, and pruritic, and had resolved significantly after last chemotherapy treatment (1/3).  Currently complaining of bilateral UE (shoulder to elbow) weakness - feels "heavy".  Started in the L breast area which spread to the L shoulder and then R shoulder.      L breast cancer s/p completion of 4 cycles of demi-adjuvant taxotere and cytoxan (completed on 5/9/17) and mastectomy (6/26/17) and then 4 cycles of adjuvant Adriamycin (completed 9/12/17). In 10/2017 - patient developed L supraclavicular lymphadenopathy which FNA confirmed as reoccurrence of previously treated breast cancer. "     Had Abraxane infusion (1/3) - on the 3rd round of chemotherapy of abraxane and immunomodulator.  Scheduled to complete 3 rounds and then PET scan for evaluation/determination if more treatment require.      Denies any family history of autoimmune diseases.    No smoking, EtOH, recreational drug usage.    Denies any photosensitivity, joint swelling, unintentional weigh loss, abdominal pain, night sweats, CP, SOB.  +oral thrush.     Past Medical History:   Diagnosis Date    Breast cancer 2017    left    Depression     Diverticulosis     Gastritis     Hypertension     Vitamin B12 deficiency 3/8/2018       Past Surgical History:   Procedure Laterality Date    BIOPSY-SENTINEL NODE Left 2017    [facility]    BREAST BIOPSY Left     BREAST RECONSTRUCTION Left 2017     SECTION      COLONOSCOPY  2011    repeat in 10 yrs.    D&C      LQACSRKIA-NVSZ-U-CATH Right 10/31/2018    Per[facility] at [facility] 2ND FLR    FZBUHVVZD-GCIM-J-CATH Right 2017    [facility]    UBLMVMUXP-OVRO-C-CATH-neck or chest Fluoro needed Consent AM of surgery Right 10/30/2018    Performed by [facility] at [facility] OR 2ND FLR    MASTECTOMY Left 2017    MASTECTOMY Left 2017    Performed by [facility] at [facility] OR    MYOMECTOMY      PORTACATH PLACEMENT      RECONSTRUCTION-BREAST/FLAP-SCOTT Left 2017    Performed by [facility]inder [facility]ell [facility] at [facility] OR    SENTINEL LYMPH NODE BIOPSY  2017    left    TOTAL REDUCTION MAMMOPLASTY Right 2017       Immunization History   Administered Date(s) Administered    Influenza - Quadrivalent - PF 10/24/2018    Tdap 2018       Review of patient's allergies indicates:  No Known Allergies  Current Facility-Administered Medications   Medication Frequency    0.9%  NaCl infusion Continuous    acetaminophen tablet 650 mg Q4H PRN    ALPRAZolam tablet 0.25 mg TID  PRN    amLODIPine tablet 10 mg Daily    calcium carbonate (OS-ESTELA) tablet 500 mg Daily    cetirizine tablet 10 mg Daily    cyanocobalamin tablet 500 mcg Daily    dextrose 50% injection 12.5 g PRN    dextrose 50% injection 25 g PRN    enoxaparin injection 40 mg Daily    escitalopram oxalate tablet 10 mg Daily    famotidine tablet 20 mg BID    fluticasone 50 mcg/actuation nasal spray 50 mcg Daily    glucagon (human recombinant) injection 1 mg PRN    glucose chewable tablet 16 g PRN    glucose chewable tablet 24 g PRN    insulin aspart U-100 pen 0-5 Units QID (AC + HS) PRN    methylPREDNISolone sodium succinate (SOLU-MEDROL) 1,000 mg in dextrose 5 % 100 mL IVPB Once    nystatin 100,000 unit/mL suspension 500,000 Units QID    ondansetron disintegrating tablet 8 mg Q8H PRN    oxyCODONE immediate release tablet 5 mg Q6H PRN    polyethylene glycol packet 17 g BID PRN    promethazine tablet 12.5 mg Q6H PRN    sodium chloride 0.9% flush 5 mL PRN    vitamin D 1000 units tablet 1,000 Units Daily     Family History     Problem Relation (Age of Onset)    Breast cancer Sister (52)    Heart disease Father    Hypertension Father, Mother    No Known Problems Brother, Son, Brother, Brother, Sister, Daughter    Thyroid disease Daughter        Tobacco Use    Smoking status: Never Smoker    Smokeless tobacco: Never Used   Substance and Sexual Activity    Alcohol use: Yes     Comment: wine    Drug use: No    Sexual activity: Yes     Partners: Male     Birth control/protection: Post-menopausal     Review of Systems   Constitutional: Positive for fatigue. Negative for activity change, appetite change, chills, diaphoresis, fever and unexpected weight change.   HENT: Positive for facial swelling and trouble swallowing. Negative for congestion, ear discharge, hearing loss, postnasal drip, sinus pressure, sneezing, sore throat and tinnitus.    Eyes: Negative for photophobia, pain and redness.   Respiratory: Negative  for apnea, cough, choking, chest tightness, shortness of breath and stridor.    Cardiovascular: Negative for chest pain, palpitations and leg swelling.   Gastrointestinal: Negative for abdominal pain, anal bleeding, constipation, diarrhea, nausea and rectal pain.   Endocrine: Negative for polyuria.   Genitourinary: Negative for dysuria and hematuria.   Musculoskeletal: Positive for myalgias. Negative for arthralgias, back pain, gait problem, joint swelling, neck pain and neck stiffness.   Skin: Positive for rash. Negative for color change and pallor.   Allergic/Immunologic: Negative for immunocompromised state.   Neurological: Negative for dizziness, seizures and numbness.        Dysphagia  Odynophagia   Hematological: Positive for adenopathy. Does not bruise/bleed easily.   Psychiatric/Behavioral: Negative for agitation and behavioral problems.     Objective:     Vital Signs (Most Recent):  Temp: 98.2 °F (36.8 °C) (01/23/19 0721)  Pulse: 102 (01/23/19 0721)  Resp: 16 (01/23/19 0721)  BP: 135/72 (01/23/19 0721)  SpO2: 99 % (01/23/19 0721)  O2 Device (Oxygen Therapy): room air (01/23/19 0721) Vital Signs (24h Range):  Temp:  [98.1 °F (36.7 °C)-98.8 °F (37.1 °C)] 98.2 °F (36.8 °C)  Pulse:  [] 102  Resp:  [16-24] 16  SpO2:  [97 %-100 %] 99 %  BP: (131-141)/(72-85) 135/72     Weight: 85.5 kg (188 lb 8 oz) (01/23/19 0400)  Body mass index is 31.37 kg/m².  Body surface area is 1.98 meters squared.      Intake/Output Summary (Last 24 hours) at 1/23/2019 0944  Last data filed at 1/23/2019 0626  Gross per 24 hour   Intake 2297.5 ml   Output 1850 ml   Net 447.5 ml       Physical Exam   Constitutional: She is oriented to person, place, and time and well-developed, well-nourished, and in no distress. No distress.   HENT:   Head: Normocephalic and atraumatic.   Right Ear: External ear normal.   Left Ear: External ear normal.   Bilateral orbital edema with heliotropic rash    Eyes: Conjunctivae and EOM are normal. Pupils  are equal, round, and reactive to light.   Neck: Normal range of motion. Neck supple.   Cardiovascular: Normal rate, regular rhythm, normal heart sounds and intact distal pulses.    Pulmonary/Chest: Effort normal and breath sounds normal. No respiratory distress.   Abdominal: Soft. Bowel sounds are normal.   Neurological: She is alert and oriented to person, place, and time. GCS score is 15.   Skin: Skin is warm and dry. No rash noted.     Hyperpigmented non raised rash over nape of neck, elbows, knuckles (resemble Gottron's papules), thighs, and ankles.     Thigh rash is red with scaly skin.   Psychiatric: Mood, memory, affect and judgment normal.   Musculoskeletal: Normal range of motion. She exhibits no edema, tenderness or deformity.   No signs of synovitis. ROM intact   2/5 strength of bilateral UE muscles  4/5 of bilateral LE muscles         Significant Labs:  Recent Results (from the past 48 hour(s))   Comprehensive metabolic panel    Collection Time: 01/22/19 11:19 AM   Result Value Ref Range    Sodium 132 (L) 136 - 145 mmol/L    Potassium 4.3 3.5 - 5.1 mmol/L    Chloride 96 95 - 110 mmol/L    CO2 27 23 - 29 mmol/L    Glucose 140 (H) 70 - 110 mg/dL    BUN, Bld 16 6 - 20 mg/dL    Creatinine 0.8 0.5 - 1.4 mg/dL    Calcium 9.3 8.7 - 10.5 mg/dL    Total Protein 7.1 6.0 - 8.4 g/dL    Albumin 3.1 (L) 3.5 - 5.2 g/dL    Total Bilirubin 0.4 0.1 - 1.0 mg/dL    Alkaline Phosphatase 91 55 - 135 U/L     (H) 10 - 40 U/L    ALT 80 (H) 10 - 44 U/L    Anion Gap 9 8 - 16 mmol/L    eGFR if African American >60.0 >60 mL/min/1.73 m^2    eGFR if non African American >60.0 >60 mL/min/1.73 m^2   CK    Collection Time: 01/22/19 11:19 AM   Result Value Ref Range    CPK 4562 (H) 20 - 180 U/L   Hemoglobin A1c    Collection Time: 01/22/19 10:24 PM   Result Value Ref Range    Hemoglobin A1C 6.3 (H) 4.0 - 5.6 %    Estimated Avg Glucose 134 (H) 68 - 131 mg/dL   Comprehensive Metabolic Panel (CMP)    Collection Time: 01/22/19 10:24  PM   Result Value Ref Range    Sodium 130 (L) 136 - 145 mmol/L    Potassium 3.7 3.5 - 5.1 mmol/L    Chloride 96 95 - 110 mmol/L    CO2 26 23 - 29 mmol/L    Glucose 158 (H) 70 - 110 mg/dL    BUN, Bld 20 6 - 20 mg/dL    Creatinine 0.8 0.5 - 1.4 mg/dL    Calcium 8.7 8.7 - 10.5 mg/dL    Total Protein 6.7 6.0 - 8.4 g/dL    Albumin 3.0 (L) 3.5 - 5.2 g/dL    Total Bilirubin 0.4 0.1 - 1.0 mg/dL    Alkaline Phosphatase 96 55 - 135 U/L     (H) 10 - 40 U/L    ALT 73 (H) 10 - 44 U/L    Anion Gap 8 8 - 16 mmol/L    eGFR if African American >60.0 >60 mL/min/1.73 m^2    eGFR if non African American >60.0 >60 mL/min/1.73 m^2   TSH    Collection Time: 01/22/19 10:24 PM   Result Value Ref Range    TSH 3.586 0.400 - 4.000 uIU/mL   Lactic acid, plasma    Collection Time: 01/22/19 10:24 PM   Result Value Ref Range    Lactate (Lactic Acid) 1.2 0.5 - 2.2 mmol/L   CK    Collection Time: 01/22/19 10:24 PM   Result Value Ref Range    CPK 4319 (H) 20 - 180 U/L   PT/INR    Collection Time: 01/22/19 10:24 PM   Result Value Ref Range    Prothrombin Time 10.9 9.0 - 12.5 sec    INR 1.1 0.8 - 1.2   PTT    Collection Time: 01/22/19 10:24 PM   Result Value Ref Range    aPTT 25.2 21.0 - 32.0 sec   Aldolase    Collection Time: 01/22/19 10:24 PM   Result Value Ref Range    Aldolase 13.6 (H) 1.2 - 7.6 U/L   Sedimentation rate, manual    Collection Time: 01/22/19 10:24 PM   Result Value Ref Range    Sed Rate 50 (H) 0 - 36 mm/Hr   C-reactive protein    Collection Time: 01/22/19 10:24 PM   Result Value Ref Range    CRP 31.1 (H) 0.0 - 8.2 mg/L   Osmolality, Serum    Collection Time: 01/22/19 10:24 PM   Result Value Ref Range    Osmolality 287 275 - 295 mOsm/kg   Urinalysis, Reflex to Urine Culture Urine, Clean Catch    Collection Time: 01/23/19  2:55 AM   Result Value Ref Range    Specimen UA Urine, Clean Catch     Color, UA Colorless (A) Yellow, Straw, [place]    Appearance, UA Clear Clear    pH, UA 6.0 5.0 - 8.0    Specific Gravity, UA 1.005 1.005 -  1.030    Protein, UA Negative Negative    Glucose, UA Negative Negative    Ketones, UA Negative Negative    Bilirubin (UA) Negative Negative    Occult Blood UA Negative Negative    Nitrite, UA Negative Negative    Leukocytes, UA Trace (A) Negative   Sodium, urine, random    Collection Time: 01/23/19  2:56 AM   Result Value Ref Range    Sodium Urine Random <20 (A) 20 - 250 mmol/L   Osmolality, urine    Collection Time: 01/23/19  2:56 AM   Result Value Ref Range    Osmolality, Ur 127 50 - 1200 mOsm/kg   Urinalysis Microscopic    Collection Time: 01/23/19  2:56 AM   Result Value Ref Range    RBC, UA 2 0 - 4 /hpf    WBC, UA 3 0 - 5 /hpf    Squam Epithel, UA 0 /hpf    Microscopic Comment SEE COMMENT    Comprehensive Metabolic Panel (CMP)    Collection Time: 01/23/19  3:00 AM   Result Value Ref Range    Sodium 134 (L) 136 - 145 mmol/L    Potassium 3.9 3.5 - 5.1 mmol/L    Chloride 100 95 - 110 mmol/L    CO2 23 23 - 29 mmol/L    Glucose 132 (H) 70 - 110 mg/dL    BUN, Bld 16 6 - 20 mg/dL    Creatinine 0.7 0.5 - 1.4 mg/dL    Calcium 8.4 (L) 8.7 - 10.5 mg/dL    Total Protein 6.0 6.0 - 8.4 g/dL    Albumin 2.6 (L) 3.5 - 5.2 g/dL    Total Bilirubin 0.3 0.1 - 1.0 mg/dL    Alkaline Phosphatase 83 55 - 135 U/L     (H) 10 - 40 U/L    ALT 64 (H) 10 - 44 U/L    Anion Gap 11 8 - 16 mmol/L    eGFR if African American >60.0 >60 mL/min/1.73 m^2    eGFR if non African American >60.0 >60 mL/min/1.73 m^2   Magnesium    Collection Time: 01/23/19  3:00 AM   Result Value Ref Range    Magnesium 1.8 1.6 - 2.6 mg/dL   Phosphorus    Collection Time: 01/23/19  3:00 AM   Result Value Ref Range    Phosphorus 2.8 2.7 - 4.5 mg/dL   CK    Collection Time: 01/23/19  3:00 AM   Result Value Ref Range    CPK 3831 (H) 20 - 180 U/L   Lipid panel    Collection Time: 01/23/19  3:00 AM   Result Value Ref Range    Cholesterol 186 120 - 199 mg/dL    Triglycerides 68 30 - 150 mg/dL    HDL 49 40 - 75 mg/dL    LDL Cholesterol 123.4 63.0 - 159.0 mg/dL    HDL/Chol  "Ratio 26.3 20.0 - 50.0 %    Total Cholesterol/HDL Ratio 3.8 2.0 - 5.0    Non-HDL Cholesterol 137 mg/dL   CBC auto differential    Collection Time: 01/23/19  3:00 AM   Result Value Ref Range    WBC 9.11 3.90 - 12.70 K/uL    RBC 3.91 (L) 4.00 - 5.40 M/uL    Hemoglobin 10.5 (L) 12.0 - 16.0 g/dL    Hematocrit 33.4 (L) 37.0 - 48.5 %    MCV 85 82 - 98 fL    MCH 26.9 (L) 27.0 - 31.0 pg    MCHC 31.4 (L) 32.0 - 36.0 g/dL    RDW 16.4 (H) 11.5 - 14.5 %    Platelets 209 150 - 350 K/uL    MPV 9.5 9.2 - 12.9 fL    Immature Granulocytes 0.4 0.0 - 0.5 %    Gran # (ANC) 7.9 (H) 1.8 - 7.7 K/uL    Immature Grans (Abs) 0.04 0.00 - 0.04 K/uL    Lymph # 0.7 (L) 1.0 - 4.8 K/uL    Mono # 0.3 0.3 - 1.0 K/uL    Eos # 0.1 0.0 - 0.5 K/uL    Baso # 0.02 0.00 - 0.20 K/uL    nRBC 0 0 /100 WBC    Gran% 86.5 (H) 38.0 - 73.0 %    Lymph% 7.7 (L) 18.0 - 48.0 %    Mono% 3.7 (L) 4.0 - 15.0 %    Eosinophil% 1.5 0.0 - 8.0 %    Basophil% 0.2 0.0 - 1.9 %    Differential Method Automated          Significant Imaging:  Imaging results within the past 24 hours have been reviewed.    Assessment/Plan:     * Myositis    59yo F with history of L sided infiltrating ductal carcinoma of the L breast (dx on Jan 2017), HTN, depression, and gastritis was send from hem/onc clinic for evaluation of possible rhabdomyositis/myositis.     Labs: ESR 50, CRP 31.1, CPK 4562 (trending downward to 3831), Aldolase 13.6.  , ALT 64.  UA normal.  CBC unremarkable.     Patient was started on solumedrol 1g x 1 for possible treatment of myositis.  Slight improvement in symptoms.  Failed PO prednisone - worsening of symptoms    Case reports of docetaxel related inflammatory myositis had been documented. "...taxanes have been noted to cause disabling but transient arthralgia and myalgias; it is important to consider the possibility of inflammatory myopathy as a possible complication in patients undergoing treatment with these agents." - A case of docetaxel induced myositis and " review of literature. Austin et al 2015. 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   Dermatomyositis is usually associated with malignancy.        Plan  - Hold paclitaxel agent at this time  - continue to monitor CPK/Aldolase daily  - transition to methylprednisone 125mg BID  - oral thrush - management as per primary  - pending myomarker panel and DARCY  - recommendation for dermatology consult - possible biopsy of rash for evaluation of medication induced (chemotherapy) vs dermatomyositis  - MRI of LUE for evaluation of myositis  - If dermatology unable to differentiate rash and + myositis on MRI - consideration for muscle biopsy by general surgery         Thank you for your consult. I will follow-up with patient. Please contact us if you have any additional questions.    Clarissa Swift MD  Rheumatology  [facility]    ATTENDING ADDENDUM:59 yo F L sided breast cancer, s/p 3 cycles of nab-paclitaxel and atezolizumab that presented  from clinic with proximal weakness and elevated CPK.  Patient is s/p completion of 4 cycles of demi-adjuvant taxotere and cytoxan (completed on 5/9/17) . She also had  mastectomy (6/26/17) and then 4 cycles of adjuvant Adriamycin (completed 9/12/17). In 10/2017, she developed L supraclavicular lymphadenopathy which FNA confirmed as reoccurrence of previously treated breast cancer.   She has had 3 new cycles of paclitaxel and immunomodulator.  Last paclitaxel was January 3.  Given patients rashes and proximal weakness, would like to exclude Dermatomyositis.  Please consult dermatology for biopsy of rash and MRI of LUE to evaluate for myositis.  Please start on medrol 125mg IV BID.  Will consider muscle biopsy after we review MRI.

## 2019-01-24 NOTE — SUBJECTIVE & OBJECTIVE
Past Medical History:   Diagnosis Date    Breast cancer 2017    left    Depression     Diverticulosis     Gastritis     Hypertension     Vitamin B12 deficiency 3/8/2018       Past Surgical History:   Procedure Laterality Date    BIOPSY-SENTINEL NODE Left 2017    [facility]    BREAST BIOPSY Left     BREAST RECONSTRUCTION Left 2017     SECTION      COLONOSCOPY  2011    repeat in 10 yrs.    D&C      FGILZEXND-CPSG-H-CATH Right 10/31/2018    Performed [facility] at [facility] 2ND FLR    TZXVRSJKB-LICD-Q-CATH Right 2017    [facility]    LIZYQXOUB-ZRDX-S-CATH-neck or chest Fluoro needed Consent AM of surgery Right 10/30/2018    Performed by [facility] at [facility] OR 2ND FLR    MASTECTOMY Left 2017    MASTECTOMY Left 2017    [facility] at [facility] OR    MYOMECTOMY      PORTACATH PLACEMENT      RECONSTRUCTION-BREAST/FLAP-SCOTT Left 2017    Performed by [facility]inder Sewell MD at [facility] OR    SENTINEL LYMPH NODE BIOPSY  2017    left    TOTAL REDUCTION MAMMOPLASTY Right 2017     Family History     Problem Relation (Age of Onset)    Breast cancer Sister (52)    Heart disease Father    Hypertension Father, Mother    No Known Problems Brother, Son, Brother, Brother, Sister, Daughter    Thyroid disease Daughter        Tobacco Use    Smoking status: Never Smoker    Smokeless tobacco: Never Used   Substance and Sexual Activity    Alcohol use: Yes     Comment: wine    Drug use: No    Sexual activity: Yes     Partners: Male     Birth control/protection: Post-menopausal       Review of patient's allergies indicates:  No Known Allergies    Medications:  Continuous Infusions:   sodium chloride 0.9%       Scheduled Meds:   amLODIPine  10 mg Oral Daily    calcium carbonate  500 mg Oral Daily    cetirizine  10 mg Oral Daily    cyanocobalamin  500 mcg Oral Daily     enoxaparin  40 mg Subcutaneous Daily    escitalopram oxalate  10 mg Oral Daily    famotidine  20 mg Oral BID    fluticasone  1 spray Each Nare Daily    methylPREDNISolone sodium succinate  125 mg Intravenous BID    nystatin  500,000 Units Oral QID    potassium, sodium phosphates  2 packet Oral Q4H    vitamin D  1,000 Units Oral Daily     PRN Meds:acetaminophen, ALPRAZolam, dextrose 50%, dextrose 50%, glucagon (human recombinant), glucose, glucose, insulin aspart U-100, ondansetron, oxyCODONE, polyethylene glycol, promethazine, sodium chloride 0.9%    Review of Systems   Constitutional: Negative for weight loss.   HENT: Positive for mouth sores ( thrush) and trouble swallowing ( difficulty chewing/swallowing).    Eyes: Negative for eye irritation.   Musculoskeletal: Positive for muscle weakness ( difficulty lifting arms).   Skin: Positive for itching and rash.     Objective:     Vital Signs (Most Recent):  Temp: 97.9 °F (36.6 °C) (01/24/19 0800)  Pulse: 106 (01/24/19 0800)  Resp: 19 (01/24/19 0800)  BP: 133/70 (01/24/19 0800)  SpO2: 98 % (01/24/19 0800) Vital Signs (24h Range):  Temp:  [97.5 °F (36.4 °C)-97.9 °F (36.6 °C)] 97.9 °F (36.6 °C)  Pulse:  [] 106  Resp:  [18-19] 19  SpO2:  [95 %-100 %] 98 %  BP: (133-185)/(70-86) 133/70     Weight: 88.7 kg (195 lb 9.6 oz)  Body mass index is 32.55 kg/m².    Physical Exam   Constitutional: She appears well-developed and well-nourished.   Neurological: She is alert and oriented to person, place, and time.   Psychiatric: She has a normal mood and affect.   Skin:   Areas Examined (abnormalities noted in diagram):   Head / Face Inspection Performed  Neck Inspection Performed  Chest / Axilla Inspection Performed  Abdomen Inspection Performed  Back Inspection Performed  RUE Inspected  LUE Inspection Performed  RLE Inspected  LLE Inspection Performed  Nails and Digits Inspection Performed                               Significant Labs:   Recent Lab Results        01/24/19  0340        Immature Granulocytes 0.7     Immature Grans (Abs) 0.06  Comment:  Mild elevation in immature granulocytes is non specific and   can be seen in a variety of conditions including stress response,   acute inflammation, trauma and pregnancy. Correlation with other   laboratory and clinical findings is essential.       Albumin 2.5     Alkaline Phosphatase 77     ALT 57     Anion Gap 7          Baso # 0.01     Basophil% 0.1     Total Bilirubin 0.3  Comment:  For infants and newborns, interpretation of results should be based  on gestational age, weight and in agreement with clinical  observations.  Premature Infant recommended reference ranges:  Up to 24 hours.............<8.0 mg/dL  Up to 48 hours............<12.0 mg/dL  3-5 days..................<15.0 mg/dL  6-29 days.................<15.0 mg/dL       BUN, Bld 7     Calcium 8.3     Chloride 107     CO2 24     CPK 2747     Creatinine 0.5     Differential Method Automated     eGFR if African American >60.0     eGFR if non  >60.0  Comment:  Calculation used to obtain the estimated glomerular filtration  rate (eGFR) is the CKD-EPI equation.        Eos # 0.0     Eosinophil% 0.0     Glucose 137     Gran # (ANC) 7.7     Gran% 84.8     Hematocrit 30.7     Hemoglobin 9.8     Lymph # 0.7     Lymph% 8.2     Magnesium 1.7     MCH 27.3     MCHC 31.9     MCV 86     Mono # 0.6     Mono% 6.2     MPV 9.3     nRBC 0     Phosphorus 2.3     Platelets 197     Potassium 3.4     Total Protein 5.8     RBC 3.59     RDW 16.4     Sodium 138     WBC 9.03           Significant Imaging: I have reviewed all pertinent imaging results/findings within the past 24 hours.

## 2019-01-24 NOTE — SUBJECTIVE & OBJECTIVE
Interval History: Patient was seen resting comfortably in chair.  States mild improvement of her UE strength compared to yesterday.  She is now able to lift it higher than yesterday.  Able to get herself up from chair without assistance.      Denies any new rash formation, N/V, fever/chills, abdominal pain, night sweat.  Tolerating diet and working with PT.     Current Facility-Administered Medications   Medication Frequency    0.9%  NaCl infusion Continuous    acetaminophen tablet 650 mg Q4H PRN    ALPRAZolam tablet 0.25 mg TID PRN    amLODIPine tablet 10 mg Daily    calcium carbonate (OS-ESTELA) tablet 500 mg Daily    cetirizine tablet 10 mg Daily    cyanocobalamin tablet 500 mcg Daily    dextrose 50% injection 12.5 g PRN    dextrose 50% injection 25 g PRN    enoxaparin injection 40 mg Daily    escitalopram oxalate tablet 10 mg Daily    famotidine tablet 20 mg BID    fluticasone 50 mcg/actuation nasal spray 50 mcg Daily    glucagon (human recombinant) injection 1 mg PRN    glucose chewable tablet 16 g PRN    glucose chewable tablet 24 g PRN    insulin aspart U-100 pen 0-5 Units QID (AC + HS) PRN    methylPREDNISolone sodium succinate injection 125 mg BID    nystatin 100,000 unit/mL suspension 500,000 Units QID    ondansetron disintegrating tablet 8 mg Q8H PRN    oxyCODONE immediate release tablet 5 mg Q6H PRN    polyethylene glycol packet 17 g BID PRN    promethazine tablet 12.5 mg Q6H PRN    sodium chloride 0.9% flush 5 mL PRN    vitamin D 1000 units tablet 1,000 Units Daily     Objective:     Vital Signs (Most Recent):  Temp: 97.6 °F (36.4 °C) (01/24/19 1631)  Pulse: 99 (01/24/19 1631)  Resp: 17 (01/24/19 1631)  BP: (!) 143/74 (01/24/19 1631)  SpO2: 100 % (01/24/19 1631)  O2 Device (Oxygen Therapy): room air (01/24/19 1241) Vital Signs (24h Range):  Temp:  [97.5 °F (36.4 °C)-97.9 °F (36.6 °C)] 97.6 °F (36.4 °C)  Pulse:  [] 99  Resp:  [17-19] 17  SpO2:  [95 %-100 %] 100 %  BP:  (133-185)/(70-86) 143/74     Weight: 88.7 kg (195 lb 9.6 oz) (01/24/19 0722)  Body mass index is 32.55 kg/m².  Body surface area is 2.02 meters squared.      Intake/Output Summary (Last 24 hours) at 1/24/2019 1644  Last data filed at 1/24/2019 1115  Gross per 24 hour   Intake 6235.84 ml   Output 2900 ml   Net 3335.84 ml       Physical Exam   Constitutional: She is oriented to person, place, and time and well-developed, well-nourished, and in no distress. No distress.   HENT:   Head: Normocephalic and atraumatic.   Right Ear: External ear normal.   Left Ear: External ear normal.   Bilateral orbital edema with heliotropic rash    Eyes: Conjunctivae and EOM are normal. Pupils are equal, round, and reactive to light.   Neck: Normal range of motion. Neck supple.   Cardiovascular: Normal rate, regular rhythm, normal heart sounds and intact distal pulses.    Pulmonary/Chest: Effort normal and breath sounds normal. No respiratory distress.   Abdominal: Soft. Bowel sounds are normal.   Neurological: She is alert and oriented to person, place, and time. GCS score is 15.   Skin: Skin is warm and dry. No rash noted.     Hyperpigmented non raised rash over nape of neck, elbows, knuckles (resemble Gottron's papules), thighs, and ankles.     Thigh rash is red with scaly skin.   Psychiatric: Mood, memory, affect and judgment normal.   Musculoskeletal: Normal range of motion. She exhibits no edema, tenderness or deformity.   No signs of synovitis. ROM intact   3/5 strength of bilateral UE muscles (L side weaker than R)  4/5 of bilateral LE muscles         Significant Labs:  Recent Results (from the past 48 hour(s))   Hemoglobin A1c    Collection Time: 01/22/19 10:24 PM   Result Value Ref Range    Hemoglobin A1C 6.3 (H) 4.0 - 5.6 %    Estimated Avg Glucose 134 (H) 68 - 131 mg/dL   Comprehensive Metabolic Panel (CMP)    Collection Time: 01/22/19 10:24 PM   Result Value Ref Range    Sodium 130 (L) 136 - 145 mmol/L    Potassium 3.7 3.5  - 5.1 mmol/L    Chloride 96 95 - 110 mmol/L    CO2 26 23 - 29 mmol/L    Glucose 158 (H) 70 - 110 mg/dL    BUN, Bld 20 6 - 20 mg/dL    Creatinine 0.8 0.5 - 1.4 mg/dL    Calcium 8.7 8.7 - 10.5 mg/dL    Total Protein 6.7 6.0 - 8.4 g/dL    Albumin 3.0 (L) 3.5 - 5.2 g/dL    Total Bilirubin 0.4 0.1 - 1.0 mg/dL    Alkaline Phosphatase 96 55 - 135 U/L     (H) 10 - 40 U/L    ALT 73 (H) 10 - 44 U/L    Anion Gap 8 8 - 16 mmol/L    eGFR if African American >60.0 >60 mL/min/1.73 m^2    eGFR if non African American >60.0 >60 mL/min/1.73 m^2   TSH    Collection Time: 01/22/19 10:24 PM   Result Value Ref Range    TSH 3.586 0.400 - 4.000 uIU/mL   Lactic acid, plasma    Collection Time: 01/22/19 10:24 PM   Result Value Ref Range    Lactate (Lactic Acid) 1.2 0.5 - 2.2 mmol/L   CK    Collection Time: 01/22/19 10:24 PM   Result Value Ref Range    CPK 4319 (H) 20 - 180 U/L   PT/INR    Collection Time: 01/22/19 10:24 PM   Result Value Ref Range    Prothrombin Time 10.9 9.0 - 12.5 sec    INR 1.1 0.8 - 1.2   PTT    Collection Time: 01/22/19 10:24 PM   Result Value Ref Range    aPTT 25.2 21.0 - 32.0 sec   Aldolase    Collection Time: 01/22/19 10:24 PM   Result Value Ref Range    Aldolase 13.6 (H) 1.2 - 7.6 U/L   Sedimentation rate, manual    Collection Time: 01/22/19 10:24 PM   Result Value Ref Range    Sed Rate 50 (H) 0 - 36 mm/Hr   C-reactive protein    Collection Time: 01/22/19 10:24 PM   Result Value Ref Range    CRP 31.1 (H) 0.0 - 8.2 mg/L   DARCY    Collection Time: 01/22/19 10:24 PM   Result Value Ref Range    DARCY Screen Positive (A) Negative <1:160   Osmolality, Serum    Collection Time: 01/22/19 10:24 PM   Result Value Ref Range    Osmolality 287 275 - 295 mOsm/kg   DARCY Titer    Collection Time: 01/22/19 10:24 PM   Result Value Ref Range    DARCY HEP-2 Titer Positive 1:640 Speckled     DARCY Profile    Collection Time: 01/22/19 10:24 PM   Result Value Ref Range    Anti Sm Antibody 0.58 0.00 - 19.99 EU    Anti-Sm Interpretation  Negative Negative    Anti-SSA Antibody 0.49 0.00 - 19.99 EU    Anti-SSA Interpretation Negative Negative    Anti-SSB Antibody 0.11 0.00 - 19.99 EU    Anti-SSB Interpretation Negative Negative    ds DNA Ab Negative 1:10 Negative 1:10    Anti Sm/RNP Antibody 0.62 0.00 - 19.99 EU    Anti-Sm/RNP Interpretation Negative Negative   Urinalysis, Reflex to Urine Culture Urine, Clean Catch    Collection Time: 01/23/19  2:55 AM   Result Value Ref Range    Specimen UA Urine, Clean Catch     Color, UA Colorless (A) Yellow, Straw, [place]    Appearance, UA Clear Clear    pH, UA 6.0 5.0 - 8.0    Specific Gravity, UA 1.005 1.005 - 1.030    Protein, UA Negative Negative    Glucose, UA Negative Negative    Ketones, UA Negative Negative    Bilirubin (UA) Negative Negative    Occult Blood UA Negative Negative    Nitrite, UA Negative Negative    Leukocytes, UA Trace (A) Negative   Sodium, urine, random    Collection Time: 01/23/19  2:56 AM   Result Value Ref Range    Sodium Urine Random <20 (A) 20 - 250 mmol/L   Osmolality, urine    Collection Time: 01/23/19  2:56 AM   Result Value Ref Range    Osmolality, Ur 127 50 - 1200 mOsm/kg   Urinalysis Microscopic    Collection Time: 01/23/19  2:56 AM   Result Value Ref Range    RBC, UA 2 0 - 4 /hpf    WBC, UA 3 0 - 5 /hpf    Squam Epithel, UA 0 /hpf    Microscopic Comment SEE COMMENT    Comprehensive Metabolic Panel (CMP)    Collection Time: 01/23/19  3:00 AM   Result Value Ref Range    Sodium 134 (L) 136 - 145 mmol/L    Potassium 3.9 3.5 - 5.1 mmol/L    Chloride 100 95 - 110 mmol/L    CO2 23 23 - 29 mmol/L    Glucose 132 (H) 70 - 110 mg/dL    BUN, Bld 16 6 - 20 mg/dL    Creatinine 0.7 0.5 - 1.4 mg/dL    Calcium 8.4 (L) 8.7 - 10.5 mg/dL    Total Protein 6.0 6.0 - 8.4 g/dL    Albumin 2.6 (L) 3.5 - 5.2 g/dL    Total Bilirubin 0.3 0.1 - 1.0 mg/dL    Alkaline Phosphatase 83 55 - 135 U/L     (H) 10 - 40 U/L    ALT 64 (H) 10 - 44 U/L    Anion Gap 11 8 - 16 mmol/L    eGFR if African American  >60.0 >60 mL/min/1.73 m^2    eGFR if non African American >60.0 >60 mL/min/1.73 m^2   Magnesium    Collection Time: 01/23/19  3:00 AM   Result Value Ref Range    Magnesium 1.8 1.6 - 2.6 mg/dL   Phosphorus    Collection Time: 01/23/19  3:00 AM   Result Value Ref Range    Phosphorus 2.8 2.7 - 4.5 mg/dL   CK    Collection Time: 01/23/19  3:00 AM   Result Value Ref Range    CPK 3831 (H) 20 - 180 U/L   Lipid panel    Collection Time: 01/23/19  3:00 AM   Result Value Ref Range    Cholesterol 186 120 - 199 mg/dL    Triglycerides 68 30 - 150 mg/dL    HDL 49 40 - 75 mg/dL    LDL Cholesterol 123.4 63.0 - 159.0 mg/dL    HDL/Chol Ratio 26.3 20.0 - 50.0 %    Total Cholesterol/HDL Ratio 3.8 2.0 - 5.0    Non-HDL Cholesterol 137 mg/dL   CBC auto differential    Collection Time: 01/23/19  3:00 AM   Result Value Ref Range    WBC 9.11 3.90 - 12.70 K/uL    RBC 3.91 (L) 4.00 - 5.40 M/uL    Hemoglobin 10.5 (L) 12.0 - 16.0 g/dL    Hematocrit 33.4 (L) 37.0 - 48.5 %    MCV 85 82 - 98 fL    MCH 26.9 (L) 27.0 - 31.0 pg    MCHC 31.4 (L) 32.0 - 36.0 g/dL    RDW 16.4 (H) 11.5 - 14.5 %    Platelets 209 150 - 350 K/uL    MPV 9.5 9.2 - 12.9 fL    Immature Granulocytes 0.4 0.0 - 0.5 %    Gran # (ANC) 7.9 (H) 1.8 - 7.7 K/uL    Immature Grans (Abs) 0.04 0.00 - 0.04 K/uL    Lymph # 0.7 (L) 1.0 - 4.8 K/uL    Mono # 0.3 0.3 - 1.0 K/uL    Eos # 0.1 0.0 - 0.5 K/uL    Baso # 0.02 0.00 - 0.20 K/uL    nRBC 0 0 /100 WBC    Gran% 86.5 (H) 38.0 - 73.0 %    Lymph% 7.7 (L) 18.0 - 48.0 %    Mono% 3.7 (L) 4.0 - 15.0 %    Eosinophil% 1.5 0.0 - 8.0 %    Basophil% 0.2 0.0 - 1.9 %    Differential Method Automated    Comprehensive Metabolic Panel (CMP)    Collection Time: 01/24/19  3:40 AM   Result Value Ref Range    Sodium 138 136 - 145 mmol/L    Potassium 3.4 (L) 3.5 - 5.1 mmol/L    Chloride 107 95 - 110 mmol/L    CO2 24 23 - 29 mmol/L    Glucose 137 (H) 70 - 110 mg/dL    BUN, Bld 7 6 - 20 mg/dL    Creatinine 0.5 0.5 - 1.4 mg/dL    Calcium 8.3 (L) 8.7 - 10.5 mg/dL     Total Protein 5.8 (L) 6.0 - 8.4 g/dL    Albumin 2.5 (L) 3.5 - 5.2 g/dL    Total Bilirubin 0.3 0.1 - 1.0 mg/dL    Alkaline Phosphatase 77 55 - 135 U/L     (H) 10 - 40 U/L    ALT 57 (H) 10 - 44 U/L    Anion Gap 7 (L) 8 - 16 mmol/L    eGFR if African American >60.0 >60 mL/min/1.73 m^2    eGFR if non African American >60.0 >60 mL/min/1.73 m^2   Magnesium    Collection Time: 01/24/19  3:40 AM   Result Value Ref Range    Magnesium 1.7 1.6 - 2.6 mg/dL   Phosphorus    Collection Time: 01/24/19  3:40 AM   Result Value Ref Range    Phosphorus 2.3 (L) 2.7 - 4.5 mg/dL   CK    Collection Time: 01/24/19  3:40 AM   Result Value Ref Range    CPK 2747 (H) 20 - 180 U/L   CBC auto differential    Collection Time: 01/24/19  3:40 AM   Result Value Ref Range    WBC 9.03 3.90 - 12.70 K/uL    RBC 3.59 (L) 4.00 - 5.40 M/uL    Hemoglobin 9.8 (L) 12.0 - 16.0 g/dL    Hematocrit 30.7 (L) 37.0 - 48.5 %    MCV 86 82 - 98 fL    MCH 27.3 27.0 - 31.0 pg    MCHC 31.9 (L) 32.0 - 36.0 g/dL    RDW 16.4 (H) 11.5 - 14.5 %    Platelets 197 150 - 350 K/uL    MPV 9.3 9.2 - 12.9 fL    Immature Granulocytes 0.7 (H) 0.0 - 0.5 %    Gran # (ANC) 7.7 1.8 - 7.7 K/uL    Immature Grans (Abs) 0.06 (H) 0.00 - 0.04 K/uL    Lymph # 0.7 (L) 1.0 - 4.8 K/uL    Mono # 0.6 0.3 - 1.0 K/uL    Eos # 0.0 0.0 - 0.5 K/uL    Baso # 0.01 0.00 - 0.20 K/uL    nRBC 0 0 /100 WBC    Gran% 84.8 (H) 38.0 - 73.0 %    Lymph% 8.2 (L) 18.0 - 48.0 %    Mono% 6.2 4.0 - 15.0 %    Eosinophil% 0.0 0.0 - 8.0 %    Basophil% 0.1 0.0 - 1.9 %    Differential Method Automated          Significant Imaging:  Imaging results within the past 24 hours have been reviewed.

## 2019-01-24 NOTE — PROGRESS NOTES
"[facility]  Rheumatology  Progress Note    Patient Name: Ermelinda Verde  MRN: [id number]  Admission Date: 1/22/2019  Hospital Length of Stay: 2 days  Code Status: Full Code   Attending Provider: Ailyn Milner MD  Primary Care Physician: Reta Weeks MD  Principal Problem: Myositis    Subjective:     HPI: 57yo F with history of L sided infiltrating ductal carcinoma of the L breast (dx on Jan 2017), HTN, depression, and gastritis was send from hem/onc clinic for evaluation of possible rhabdomyositis/myositis.    Patient had been complaining of proximal muscle weakness in the shoulder and bilateral UE and LE (UE>LE) x 2 weeks.  Associated symptoms include tenderness and rash (which she attributed to Nab paclitaxel).  Last week (1/15), patient complaining of facial/neck swelling.  CT showed lymphadenopathy without airway or vascular compromise.  Patient was started on high dose prednisone (60mg x 2, 40x2, 10mgx1) on 1/16 with no improvement of symptoms.  Lab work performed showed elevation of CPK (~4K) with AST elevation.  Patient was subsequently admitted with concern for medication (chemotherapy) induced myositis.  Associated symptoms include rash (that developed after initiation of 1st round of chemotherapy).  Rash was at the nape of the neck, bilateral elbows, knuckles, thighs, and ankle region.  Rash was red, scaly, and pruritic, and had resolved significantly after last chemotherapy treatment (1/3).  Currently complaining of bilateral UE (shoulder to elbow) weakness - feels "heavy".  Started in the L breast area which spread to the L shoulder and then R shoulder.      L breast cancer s/p completion of 4 cycles of demi-adjuvant taxotere and cytoxan (completed on 5/9/17) and mastectomy (6/26/17) and then 4 cycles of adjuvant Adriamycin (completed 9/12/17). In 10/2017 - patient developed L supraclavicular lymphadenopathy which FNA confirmed as reoccurrence of previously treated breast cancer. "     Had Abraxane infusion (1/3) - on the 3rd round of chemotherapy of abraxane and immunomodulator.  Scheduled to complete 3 rounds and then PET scan for evaluation/determination if more treatment require.      Denies any family history of autoimmune diseases.    No smoking, EtOH, recreational drug usage.    Denies any photosensitivity, joint swelling, unintentional weigh loss, abdominal pain, night sweats, CP, SOB.  +oral thrush.     Interval History: Patient was seen resting comfortably in chair.  States mild improvement of her UE strength compared to yesterday.  She is now able to lift it higher than yesterday.  Able to get herself up from chair without assistance.      Denies any new rash formation, N/V, fever/chills, abdominal pain, night sweat.  Tolerating diet and working with PT.     Current Facility-Administered Medications   Medication Frequency    0.9%  NaCl infusion Continuous    acetaminophen tablet 650 mg Q4H PRN    ALPRAZolam tablet 0.25 mg TID PRN    amLODIPine tablet 10 mg Daily    calcium carbonate (OS-ESTELA) tablet 500 mg Daily    cetirizine tablet 10 mg Daily    cyanocobalamin tablet 500 mcg Daily    dextrose 50% injection 12.5 g PRN    dextrose 50% injection 25 g PRN    enoxaparin injection 40 mg Daily    escitalopram oxalate tablet 10 mg Daily    famotidine tablet 20 mg BID    fluticasone 50 mcg/actuation nasal spray 50 mcg Daily    glucagon (human recombinant) injection 1 mg PRN    glucose chewable tablet 16 g PRN    glucose chewable tablet 24 g PRN    insulin aspart U-100 pen 0-5 Units QID (AC + HS) PRN    methylPREDNISolone sodium succinate injection 125 mg BID    nystatin 100,000 unit/mL suspension 500,000 Units QID    ondansetron disintegrating tablet 8 mg Q8H PRN    oxyCODONE immediate release tablet 5 mg Q6H PRN    polyethylene glycol packet 17 g BID PRN    promethazine tablet 12.5 mg Q6H PRN    sodium chloride 0.9% flush 5 mL PRN    vitamin D 1000 units tablet  1,000 Units Daily     Objective:     Vital Signs (Most Recent):  Temp: 97.6 °F (36.4 °C) (01/24/19 1631)  Pulse: 99 (01/24/19 1631)  Resp: 17 (01/24/19 1631)  BP: (!) 143/74 (01/24/19 1631)  SpO2: 100 % (01/24/19 1631)  O2 Device (Oxygen Therapy): room air (01/24/19 1241) Vital Signs (24h Range):  Temp:  [97.5 °F (36.4 °C)-97.9 °F (36.6 °C)] 97.6 °F (36.4 °C)  Pulse:  [] 99  Resp:  [17-19] 17  SpO2:  [95 %-100 %] 100 %  BP: (133-185)/(70-86) 143/74     Weight: 88.7 kg (195 lb 9.6 oz) (01/24/19 0722)  Body mass index is 32.55 kg/m².  Body surface area is 2.02 meters squared.      Intake/Output Summary (Last 24 hours) at 1/24/2019 1644  Last data filed at 1/24/2019 1115  Gross per 24 hour   Intake 6235.84 ml   Output 2900 ml   Net 3335.84 ml       Physical Exam   Constitutional: She is oriented to person, place, and time and well-developed, well-nourished, and in no distress. No distress.   HENT:   Head: Normocephalic and atraumatic.   Right Ear: External ear normal.   Left Ear: External ear normal.   Bilateral orbital edema with heliotropic rash    Eyes: Conjunctivae and EOM are normal. Pupils are equal, round, and reactive to light.   Neck: Normal range of motion. Neck supple.   Cardiovascular: Normal rate, regular rhythm, normal heart sounds and intact distal pulses.    Pulmonary/Chest: Effort normal and breath sounds normal. No respiratory distress.   Abdominal: Soft. Bowel sounds are normal.   Neurological: She is alert and oriented to person, place, and time. GCS score is 15.   Skin: Skin is warm and dry. No rash noted.     Hyperpigmented non raised rash over nape of neck, elbows, knuckles (resemble Gottron's papules), thighs, and ankles.     Thigh rash is red with scaly skin.   Psychiatric: Mood, memory, affect and judgment normal.   Musculoskeletal: Normal range of motion. She exhibits no edema, tenderness or deformity.   No signs of synovitis. ROM intact   3/5 strength of bilateral UE muscles (L side  weaker than R)  4/5 of bilateral LE muscles         Significant Labs:  Recent Results (from the past 48 hour(s))   Hemoglobin A1c    Collection Time: 01/22/19 10:24 PM   Result Value Ref Range    Hemoglobin A1C 6.3 (H) 4.0 - 5.6 %    Estimated Avg Glucose 134 (H) 68 - 131 mg/dL   Comprehensive Metabolic Panel (CMP)    Collection Time: 01/22/19 10:24 PM   Result Value Ref Range    Sodium 130 (L) 136 - 145 mmol/L    Potassium 3.7 3.5 - 5.1 mmol/L    Chloride 96 95 - 110 mmol/L    CO2 26 23 - 29 mmol/L    Glucose 158 (H) 70 - 110 mg/dL    BUN, Bld 20 6 - 20 mg/dL    Creatinine 0.8 0.5 - 1.4 mg/dL    Calcium 8.7 8.7 - 10.5 mg/dL    Total Protein 6.7 6.0 - 8.4 g/dL    Albumin 3.0 (L) 3.5 - 5.2 g/dL    Total Bilirubin 0.4 0.1 - 1.0 mg/dL    Alkaline Phosphatase 96 55 - 135 U/L     (H) 10 - 40 U/L    ALT 73 (H) 10 - 44 U/L    Anion Gap 8 8 - 16 mmol/L    eGFR if African American >60.0 >60 mL/min/1.73 m^2    eGFR if non African American >60.0 >60 mL/min/1.73 m^2   TSH    Collection Time: 01/22/19 10:24 PM   Result Value Ref Range    TSH 3.586 0.400 - 4.000 uIU/mL   Lactic acid, plasma    Collection Time: 01/22/19 10:24 PM   Result Value Ref Range    Lactate (Lactic Acid) 1.2 0.5 - 2.2 mmol/L   CK    Collection Time: 01/22/19 10:24 PM   Result Value Ref Range    CPK 4319 (H) 20 - 180 U/L   PT/INR    Collection Time: 01/22/19 10:24 PM   Result Value Ref Range    Prothrombin Time 10.9 9.0 - 12.5 sec    INR 1.1 0.8 - 1.2   PTT    Collection Time: 01/22/19 10:24 PM   Result Value Ref Range    aPTT 25.2 21.0 - 32.0 sec   Aldolase    Collection Time: 01/22/19 10:24 PM   Result Value Ref Range    Aldolase 13.6 (H) 1.2 - 7.6 U/L   Sedimentation rate, manual    Collection Time: 01/22/19 10:24 PM   Result Value Ref Range    Sed Rate 50 (H) 0 - 36 mm/Hr   C-reactive protein    Collection Time: 01/22/19 10:24 PM   Result Value Ref Range    CRP 31.1 (H) 0.0 - 8.2 mg/L   DARCY    Collection Time: 01/22/19 10:24 PM   Result Value Ref  Range    DARCY Screen Positive (A) Negative <1:160   Osmolality, Serum    Collection Time: 01/22/19 10:24 PM   Result Value Ref Range    Osmolality 287 275 - 295 mOsm/kg   DARCY Titer    Collection Time: 01/22/19 10:24 PM   Result Value Ref Range    DARCY HEP-2 Titer Positive 1:640 Speckled     DARCY Profile    Collection Time: 01/22/19 10:24 PM   Result Value Ref Range    Anti Sm Antibody 0.58 0.00 - 19.99 EU    Anti-Sm Interpretation Negative Negative    Anti-SSA Antibody 0.49 0.00 - 19.99 EU    Anti-SSA Interpretation Negative Negative    Anti-SSB Antibody 0.11 0.00 - 19.99 EU    Anti-SSB Interpretation Negative Negative    ds DNA Ab Negative 1:10 Negative 1:10    Anti Sm/RNP Antibody 0.62 0.00 - 19.99 EU    Anti-Sm/RNP Interpretation Negative Negative   Urinalysis, Reflex to Urine Culture Urine, Clean Catch    Collection Time: 01/23/19  2:55 AM   Result Value Ref Range    Specimen UA Urine, Clean Catch     Color, UA Colorless (A) Yellow, Straw, Liberty    Appearance, UA Clear Clear    pH, UA 6.0 5.0 - 8.0    Specific Gravity, UA 1.005 1.005 - 1.030    Protein, UA Negative Negative    Glucose, UA Negative Negative    Ketones, UA Negative Negative    Bilirubin (UA) Negative Negative    Occult Blood UA Negative Negative    Nitrite, UA Negative Negative    Leukocytes, UA Trace (A) Negative   Sodium, urine, random    Collection Time: 01/23/19  2:56 AM   Result Value Ref Range    Sodium Urine Random <20 (A) 20 - 250 mmol/L   Osmolality, urine    Collection Time: 01/23/19  2:56 AM   Result Value Ref Range    Osmolality, Ur 127 50 - 1200 mOsm/kg   Urinalysis Microscopic    Collection Time: 01/23/19  2:56 AM   Result Value Ref Range    RBC, UA 2 0 - 4 /hpf    WBC, UA 3 0 - 5 /hpf    Squam Epithel, UA 0 /hpf    Microscopic Comment SEE COMMENT    Comprehensive Metabolic Panel (CMP)    Collection Time: 01/23/19  3:00 AM   Result Value Ref Range    Sodium 134 (L) 136 - 145 mmol/L    Potassium 3.9 3.5 - 5.1 mmol/L    Chloride 100 95 -  110 mmol/L    CO2 23 23 - 29 mmol/L    Glucose 132 (H) 70 - 110 mg/dL    BUN, Bld 16 6 - 20 mg/dL    Creatinine 0.7 0.5 - 1.4 mg/dL    Calcium 8.4 (L) 8.7 - 10.5 mg/dL    Total Protein 6.0 6.0 - 8.4 g/dL    Albumin 2.6 (L) 3.5 - 5.2 g/dL    Total Bilirubin 0.3 0.1 - 1.0 mg/dL    Alkaline Phosphatase 83 55 - 135 U/L     (H) 10 - 40 U/L    ALT 64 (H) 10 - 44 U/L    Anion Gap 11 8 - 16 mmol/L    eGFR if African American >60.0 >60 mL/min/1.73 m^2    eGFR if non African American >60.0 >60 mL/min/1.73 m^2   Magnesium    Collection Time: 01/23/19  3:00 AM   Result Value Ref Range    Magnesium 1.8 1.6 - 2.6 mg/dL   Phosphorus    Collection Time: 01/23/19  3:00 AM   Result Value Ref Range    Phosphorus 2.8 2.7 - 4.5 mg/dL   CK    Collection Time: 01/23/19  3:00 AM   Result Value Ref Range    CPK 3831 (H) 20 - 180 U/L   Lipid panel    Collection Time: 01/23/19  3:00 AM   Result Value Ref Range    Cholesterol 186 120 - 199 mg/dL    Triglycerides 68 30 - 150 mg/dL    HDL 49 40 - 75 mg/dL    LDL Cholesterol 123.4 63.0 - 159.0 mg/dL    HDL/Chol Ratio 26.3 20.0 - 50.0 %    Total Cholesterol/HDL Ratio 3.8 2.0 - 5.0    Non-HDL Cholesterol 137 mg/dL   CBC auto differential    Collection Time: 01/23/19  3:00 AM   Result Value Ref Range    WBC 9.11 3.90 - 12.70 K/uL    RBC 3.91 (L) 4.00 - 5.40 M/uL    Hemoglobin 10.5 (L) 12.0 - 16.0 g/dL    Hematocrit 33.4 (L) 37.0 - 48.5 %    MCV 85 82 - 98 fL    MCH 26.9 (L) 27.0 - 31.0 pg    MCHC 31.4 (L) 32.0 - 36.0 g/dL    RDW 16.4 (H) 11.5 - 14.5 %    Platelets 209 150 - 350 K/uL    MPV 9.5 9.2 - 12.9 fL    Immature Granulocytes 0.4 0.0 - 0.5 %    Gran # (ANC) 7.9 (H) 1.8 - 7.7 K/uL    Immature Grans (Abs) 0.04 0.00 - 0.04 K/uL    Lymph # 0.7 (L) 1.0 - 4.8 K/uL    Mono # 0.3 0.3 - 1.0 K/uL    Eos # 0.1 0.0 - 0.5 K/uL    Baso # 0.02 0.00 - 0.20 K/uL    nRBC 0 0 /100 WBC    Gran% 86.5 (H) 38.0 - 73.0 %    Lymph% 7.7 (L) 18.0 - 48.0 %    Mono% 3.7 (L) 4.0 - 15.0 %    Eosinophil% 1.5 0.0 - 8.0  %    Basophil% 0.2 0.0 - 1.9 %    Differential Method Automated    Comprehensive Metabolic Panel (CMP)    Collection Time: 01/24/19  3:40 AM   Result Value Ref Range    Sodium 138 136 - 145 mmol/L    Potassium 3.4 (L) 3.5 - 5.1 mmol/L    Chloride 107 95 - 110 mmol/L    CO2 24 23 - 29 mmol/L    Glucose 137 (H) 70 - 110 mg/dL    BUN, Bld 7 6 - 20 mg/dL    Creatinine 0.5 0.5 - 1.4 mg/dL    Calcium 8.3 (L) 8.7 - 10.5 mg/dL    Total Protein 5.8 (L) 6.0 - 8.4 g/dL    Albumin 2.5 (L) 3.5 - 5.2 g/dL    Total Bilirubin 0.3 0.1 - 1.0 mg/dL    Alkaline Phosphatase 77 55 - 135 U/L     (H) 10 - 40 U/L    ALT 57 (H) 10 - 44 U/L    Anion Gap 7 (L) 8 - 16 mmol/L    eGFR if African American >60.0 >60 mL/min/1.73 m^2    eGFR if non African American >60.0 >60 mL/min/1.73 m^2   Magnesium    Collection Time: 01/24/19  3:40 AM   Result Value Ref Range    Magnesium 1.7 1.6 - 2.6 mg/dL   Phosphorus    Collection Time: 01/24/19  3:40 AM   Result Value Ref Range    Phosphorus 2.3 (L) 2.7 - 4.5 mg/dL   CK    Collection Time: 01/24/19  3:40 AM   Result Value Ref Range    CPK 2747 (H) 20 - 180 U/L   CBC auto differential    Collection Time: 01/24/19  3:40 AM   Result Value Ref Range    WBC 9.03 3.90 - 12.70 K/uL    RBC 3.59 (L) 4.00 - 5.40 M/uL    Hemoglobin 9.8 (L) 12.0 - 16.0 g/dL    Hematocrit 30.7 (L) 37.0 - 48.5 %    MCV 86 82 - 98 fL    MCH 27.3 27.0 - 31.0 pg    MCHC 31.9 (L) 32.0 - 36.0 g/dL    RDW 16.4 (H) 11.5 - 14.5 %    Platelets 197 150 - 350 K/uL    MPV 9.3 9.2 - 12.9 fL    Immature Granulocytes 0.7 (H) 0.0 - 0.5 %    Gran # (ANC) 7.7 1.8 - 7.7 K/uL    Immature Grans (Abs) 0.06 (H) 0.00 - 0.04 K/uL    Lymph # 0.7 (L) 1.0 - 4.8 K/uL    Mono # 0.6 0.3 - 1.0 K/uL    Eos # 0.0 0.0 - 0.5 K/uL    Baso # 0.01 0.00 - 0.20 K/uL    nRBC 0 0 /100 WBC    Gran% 84.8 (H) 38.0 - 73.0 %    Lymph% 8.2 (L) 18.0 - 48.0 %    Mono% 6.2 4.0 - 15.0 %    Eosinophil% 0.0 0.0 - 8.0 %    Basophil% 0.1 0.0 - 1.9 %    Differential Method Automated   "        Significant Imaging:  Imaging results within the past 24 hours have been reviewed.    Assessment/Plan:     * Myositis    57yo F with history of L sided infiltrating ductal carcinoma of the L breast (dx on Jan 2017), HTN, depression, and gastritis was send from hem/onc clinic for evaluation of possible rhabdomyositis/myositis.     Labs: ESR 50, CRP 31.1, CPK 4562 (trending downward to 3831 --> 2747), Aldolase 13.6.  , ALT 64.  UA normal.  CBC unremarkable.     Patient was started on solumedrol 1g x 1 for possible treatment of myositis.  Slight improvement in symptoms.  Failed PO prednisone - worsening of symptoms    Case reports of docetaxel related inflammatory myositis had been documented. "...taxanes have been noted to cause disabling but transient arthralgia and myalgias; it is important to consider the possibility of inflammatory myopathy as a possible complication in patients undergoing treatment with these agents." - A case of docetaxel induced myositis and review of literature. Austin et al 2015. Case reports in Rheumatology   Case reports of atezolizumb (immunomodulator) cause of myositis  - Lucy et al 2017 "Myositis as an adverse even of immune checkpoint blockade for cancer therapy.  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   Dermatomyositis is usually associated with malignancy.  MRI of LUE showed edema of the deltoid and biceps.      Plan  - Hold paclitaxel agent and atezolizumb at this time - both can cause myositis as AE  - continue to monitor CPK/Aldolase daily  - Continue methylprednisone 125mg BID  - oral thrush - management as per primary  - pending myomarker panel and DARCY  - pending skin biopsy result  - If dermatology unable to differentiate rash and + myositis on MRI - consideration for muscle biopsy by general surgery           Clarissa Swift MD  Rheumatology  [facility] " [place]

## 2019-01-24 NOTE — ASSESSMENT & PLAN NOTE
- DDX to include adverse reaction to chemotherapeutic drug vs steroid induced myopathy vs polymyositis vs dermatomyositis   -trend CPK daily, continue hydration 125ml /hr crystalloid initially for renal protection, monitor renal function and electrolytes  -CPK trending down  -myositis labs suspicious for myosits; rheum on board. Derm on board for skin biopsy.  -MRI left humerus suspicious for myositis  -per rheum recess, methylprednisolone 125 mg IV BID

## 2019-01-24 NOTE — ASSESSMENT & PLAN NOTE
"59yo F with history of L sided infiltrating ductal carcinoma of the L breast (dx on Jan 2017), HTN, depression, and gastritis was send from hem/onc clinic for evaluation of possible rhabdomyositis/myositis.     Labs: ESR 50, CRP 31.1, CPK 4562 (trending downward to 3831 --> 2747), Aldolase 13.6.  , ALT 64.  UA normal.  CBC unremarkable.     Patient was started on solumedrol 1g x 1 for possible treatment of myositis.  Slight improvement in symptoms.  Failed PO prednisone - worsening of symptoms    Case reports of docetaxel related inflammatory myositis had been documented. "...taxanes have been noted to cause disabling but transient arthralgia and myalgias; it is important to consider the possibility of inflammatory myopathy as a possible complication in patients undergoing treatment with these agents." - A case of docetaxel induced myositis and review of literature. Austin et al 2015. Case reports in Rheumatology   Case reports of atezolizumb (immunomodulator) cause of myositis  - Lucy et al 2017 "Myositis as an adverse even of immune checkpoint blockade for cancer therapy.  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   Dermatomyositis is usually associated with malignancy.  MRI of LUE showed edema of the deltoid and biceps.      Plan  - Hold paclitaxel agent and atezolizumb at this time - both can cause myositis as AE  - continue to monitor CPK/Aldolase daily  - Continue methylprednisone 125mg BID  - oral thrush - management as per primary  - pending myomarker panel and DARCY  - pending skin biopsy result  - If dermatology unable to differentiate rash and + myositis on MRI - consideration for muscle biopsy by general surgery  "

## 2019-01-25 PROBLEM — M79.89 SWELLING OF UPPER ARM: Status: ACTIVE | Noted: 2019-01-25

## 2019-01-25 LAB
ALBUMIN SERPL BCP-MCNC: 2.7 G/DL
ALDOLASE SERPL-CCNC: 12.7 U/L
ALP SERPL-CCNC: 83 U/L
ALT SERPL W/O P-5'-P-CCNC: 59 U/L
ANION GAP SERPL CALC-SCNC: 8 MMOL/L
ASO AB SERPL-ACNC: 60 IU/ML
AST SERPL-CCNC: 181 U/L
BASOPHILS # BLD AUTO: 0.02 K/UL
BASOPHILS NFR BLD: 0.1 %
BILIRUB SERPL-MCNC: 0.4 MG/DL
BUN SERPL-MCNC: 10 MG/DL
C3 SERPL-MCNC: 106 MG/DL
C4 SERPL-MCNC: 29 MG/DL
CALCIUM SERPL-MCNC: 8.7 MG/DL
CHLORIDE SERPL-SCNC: 104 MMOL/L
CK SERPL-CCNC: 2224 U/L
CO2 SERPL-SCNC: 26 MMOL/L
CREAT SERPL-MCNC: 0.6 MG/DL
DIFFERENTIAL METHOD: ABNORMAL
EOSINOPHIL # BLD AUTO: 0 K/UL
EOSINOPHIL NFR BLD: 0 %
ERYTHROCYTE [DISTWIDTH] IN BLOOD BY AUTOMATED COUNT: 16.9 %
EST. GFR  (AFRICAN AMERICAN): >60 ML/MIN/1.73 M^2
EST. GFR  (NON AFRICAN AMERICAN): >60 ML/MIN/1.73 M^2
GLUCOSE SERPL-MCNC: 136 MG/DL
HCT VFR BLD AUTO: 32.6 %
HGB BLD-MCNC: 10.3 G/DL
IMM GRANULOCYTES # BLD AUTO: 0.29 K/UL
IMM GRANULOCYTES NFR BLD AUTO: 1.5 %
LYMPHOCYTES # BLD AUTO: 1.4 K/UL
LYMPHOCYTES NFR BLD: 7.5 %
MAGNESIUM SERPL-MCNC: 1.8 MG/DL
MCH RBC QN AUTO: 26.3 PG
MCHC RBC AUTO-ENTMCNC: 31.6 G/DL
MCV RBC AUTO: 83 FL
MONOCYTES # BLD AUTO: 1 K/UL
MONOCYTES NFR BLD: 5.4 %
NEUTROPHILS # BLD AUTO: 16 K/UL
NEUTROPHILS NFR BLD: 85.5 %
NRBC BLD-RTO: 0 /100 WBC
PHOSPHATE SERPL-MCNC: 2.8 MG/DL
PLATELET # BLD AUTO: 253 K/UL
PMV BLD AUTO: 9.3 FL
POTASSIUM SERPL-SCNC: 3.5 MMOL/L
PROT SERPL-MCNC: 6.3 G/DL
RBC # BLD AUTO: 3.91 M/UL
SODIUM SERPL-SCNC: 138 MMOL/L
WBC # BLD AUTO: 18.76 K/UL

## 2019-01-25 PROCEDURE — 20600001 HC STEP DOWN PRIVATE ROOM

## 2019-01-25 PROCEDURE — 82550 ASSAY OF CK (CPK): CPT

## 2019-01-25 PROCEDURE — 63600175 PHARM REV CODE 636 W HCPCS: Performed by: INTERNAL MEDICINE

## 2019-01-25 PROCEDURE — 25000003 PHARM REV CODE 250: Performed by: INTERNAL MEDICINE

## 2019-01-25 PROCEDURE — 36415 COLL VENOUS BLD VENIPUNCTURE: CPT

## 2019-01-25 PROCEDURE — 84100 ASSAY OF PHOSPHORUS: CPT

## 2019-01-25 PROCEDURE — 83735 ASSAY OF MAGNESIUM: CPT

## 2019-01-25 PROCEDURE — 99233 PR SUBSEQUENT HOSPITAL CARE,LEVL III: ICD-10-PCS | Mod: ,,, | Performed by: INTERNAL MEDICINE

## 2019-01-25 PROCEDURE — 25000003 PHARM REV CODE 250: Performed by: STUDENT IN AN ORGANIZED HEALTH CARE EDUCATION/TRAINING PROGRAM

## 2019-01-25 PROCEDURE — 82085 ASSAY OF ALDOLASE: CPT

## 2019-01-25 PROCEDURE — 25000242 PHARM REV CODE 250 ALT 637 W/ HCPCS: Performed by: INTERNAL MEDICINE

## 2019-01-25 PROCEDURE — 86160 COMPLEMENT ANTIGEN: CPT

## 2019-01-25 PROCEDURE — 63600175 PHARM REV CODE 636 W HCPCS: Performed by: STUDENT IN AN ORGANIZED HEALTH CARE EDUCATION/TRAINING PROGRAM

## 2019-01-25 PROCEDURE — 86160 COMPLEMENT ANTIGEN: CPT | Mod: 59

## 2019-01-25 PROCEDURE — 85025 COMPLETE CBC W/AUTO DIFF WBC: CPT

## 2019-01-25 PROCEDURE — 80053 COMPREHEN METABOLIC PANEL: CPT

## 2019-01-25 PROCEDURE — 99233 SBSQ HOSP IP/OBS HIGH 50: CPT | Mod: ,,, | Performed by: INTERNAL MEDICINE

## 2019-01-25 RX ADMIN — METHYLPREDNISOLONE SODIUM SUCCINATE 125 MG: 125 INJECTION, POWDER, FOR SOLUTION INTRAMUSCULAR; INTRAVENOUS at 08:01

## 2019-01-25 RX ADMIN — CYANOCOBALAMIN TAB 250 MCG 500 MCG: 250 TAB at 08:01

## 2019-01-25 RX ADMIN — VITAMIN D, TAB 1000IU (100/BT) 1000 UNITS: 25 TAB at 09:01

## 2019-01-25 RX ADMIN — AMLODIPINE BESYLATE 10 MG: 10 TABLET ORAL at 08:01

## 2019-01-25 RX ADMIN — NYSTATIN 500000 UNITS: 500000 SUSPENSION ORAL at 08:01

## 2019-01-25 RX ADMIN — CALCIUM 500 MG: 500 TABLET ORAL at 08:01

## 2019-01-25 RX ADMIN — SODIUM CHLORIDE: 0.9 INJECTION, SOLUTION INTRAVENOUS at 04:01

## 2019-01-25 RX ADMIN — METHYLPREDNISOLONE SODIUM SUCCINATE 125 MG: 125 INJECTION, POWDER, FOR SOLUTION INTRAMUSCULAR; INTRAVENOUS at 09:01

## 2019-01-25 RX ADMIN — ESCITALOPRAM OXALATE 10 MG: 5 TABLET, FILM COATED ORAL at 09:01

## 2019-01-25 RX ADMIN — FLUTICASONE PROPIONATE 50 MCG: 50 SPRAY, METERED NASAL at 09:01

## 2019-01-25 RX ADMIN — ENOXAPARIN SODIUM 40 MG: 100 INJECTION SUBCUTANEOUS at 05:01

## 2019-01-25 RX ADMIN — FAMOTIDINE 20 MG: 20 TABLET ORAL at 08:01

## 2019-01-25 RX ADMIN — NYSTATIN 500000 UNITS: 500000 SUSPENSION ORAL at 09:01

## 2019-01-25 RX ADMIN — CETIRIZINE HYDROCHLORIDE 10 MG: 10 TABLET, FILM COATED ORAL at 08:01

## 2019-01-25 RX ADMIN — NYSTATIN 500000 UNITS: 500000 SUSPENSION ORAL at 03:01

## 2019-01-25 NOTE — PLAN OF CARE
Problem: Adult Inpatient Plan of Care  Goal: Plan of Care Review  Outcome: Ongoing (interventions implemented as appropriate)  Patient remains free from falls and injury this shift. Bed in low, locked position with call light in reach.  at bedside. IVF continued NS @125. Pt denies pain, n/v/d, VS stable. Patient encouraged to call for assistance when needed.  Patient verbalized understanding. All belongings within reach.  Will continue to monitor.

## 2019-01-25 NOTE — ASSESSMENT & PLAN NOTE
- DDX dermatomyositis de haylie vs related to immunotherapy  -trend CPK daily  -Stop IVF as patient eating, and arms more swollen  -CPK trending down  -myositis labs suspicious for myosits; rheum on board.   -F/u 1/24 skin biopsy. Pending results may need muscle biopsy by general surgery  -MRI left humerus suspicious for myositis  -per rheum recess, methylprednisolone 125 mg IV BID. Awaiting further recommendations pending biopsy results regarding steroid taper vs steroid-sparing immunomodulating agent

## 2019-01-25 NOTE — PROGRESS NOTES
"[facility]  Rheumatology  Progress Note    Patient Name: Ermelinda Verde  MRN: [id number]  Admission Date: 1/22/2019  Hospital Length of Stay: 3 days  Code Status: Full Code   Attending Provider: Dale Dubon MD  Primary Care Physician: Reta Weeks MD  Principal Problem: Myositis    Subjective:     HPI: 59yo F with history of L sided infiltrating ductal carcinoma of the L breast (dx on Jan 2017), HTN, depression, and gastritis was send from hem/onc clinic for evaluation of possible rhabdomyositis/myositis.    Patient had been complaining of proximal muscle weakness in the shoulder and bilateral UE and LE (UE>LE) x 2 weeks.  Associated symptoms include tenderness and rash (which she attributed to Nab paclitaxel).  Last week (1/15), patient complaining of facial/neck swelling.  CT showed lymphadenopathy without airway or vascular compromise.  Patient was started on high dose prednisone (60mg x 2, 40x2, 10mgx1) on 1/16 with no improvement of symptoms.  Lab work performed showed elevation of CPK (~4K) with AST elevation.  Patient was subsequently admitted with concern for medication (chemotherapy) induced myositis.  Associated symptoms include rash (that developed after initiation of 1st round of chemotherapy).  Rash was at the nape of the neck, bilateral elbows, knuckles, thighs, and ankle region.  Rash was red, scaly, and pruritic, and had resolved significantly after last chemotherapy treatment (1/3).  Currently complaining of bilateral UE (shoulder to elbow) weakness - feels "heavy".  Started in the L breast area which spread to the L shoulder and then R shoulder.      L breast cancer s/p completion of 4 cycles of demi-adjuvant taxotere and cytoxan (completed on 5/9/17) and mastectomy (6/26/17) and then 4 cycles of adjuvant Adriamycin (completed 9/12/17). In 10/2017 - patient developed L supraclavicular lymphadenopathy which FNA confirmed as reoccurrence of previously treated breast cancer.     Had " "Abraxane infusion (1/3) - on the 3rd round of chemotherapy of abraxane and immunomodulator.  Scheduled to complete 3 rounds and then PET scan for evaluation/determination if more treatment require.      Denies any family history of autoimmune diseases.    No smoking, EtOH, recreational drug usage.    Denies any photosensitivity, joint swelling, unintentional weigh loss, abdominal pain, night sweats, CP, SOB.  +oral thrush.     No new subjective & objective note has been filed under this hospital service since the last note was generated.    Assessment/Plan:     * Myositis    57yo F with history of L sided infiltrating ductal carcinoma of the L breast (dx on Jan 2017), HTN, depression, and gastritis was send from hem/onc clinic for evaluation of possible rhabdomyositis/myositis.     Labs: ESR 50, CRP 31.1, CPK 4562 (trending downward 2224), Aldolase 13.6.  , ALT 64.  UA normal.  CBC unremarkable.  +DARCY 1:640 speckled with negative profile    Patient was started on solumedrol 1g x 1 for possible treatment of myositis.  Slight improvement in symptoms.  Failed PO prednisone - worsening of symptoms    Case reports of docetaxel related inflammatory myositis had been documented. "...taxanes have been noted to cause disabling but transient arthralgia and myalgias; it is important to consider the possibility of inflammatory myopathy as a possible complication in patients undergoing treatment with these agents." - A case of docetaxel induced myositis and review of literature. Austin et al 2015. Case reports in Rheumatology   Case reports of atezolizumb (immunomodulator) cause of myositis  - Lucy et al 2017 "Myositis as an adverse even of immune checkpoint blockade for cancer therapy.  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   Dermatomyositis is usually associated with malignancy.  MRI of LUE showed edema of the deltoid and " biceps.      Bilateral UE is swollen and puffy.  R/O DVT (bilateral UE US - negative).  Can d/c IVF at this time - patient tolerating PO.     Plan  - Hold paclitaxel agent and atezolizumb at this time - both can cause myositis as AE  - continue to monitor CPK/Aldolase daily  - Continue methylprednisone 125mg BID  - oral thrush - management as per primary  - pending myomarker panel   - pending skin biopsy result - called pathology, will try to expedite read  - If dermatology unable to differentiate rash and + myositis on MRI - consideration for muscle biopsy by general surgery  - recommendation to discontinue IVF - patient tolerating PO.  Will encourage increase fluid intake.  - Ordered C3/C4           Clarissa Swift MD  Rheumatology  [facility]      Attending addendum:    59 yo F L sided breast cancer, s/p 3 cycles of nab-paclitaxel and atezolizumab that presented  from clinic with proximal weakness and elevated CPK.  Patient is s/p completion of 4 cycles of demi-adjuvant taxotere and cytoxan (completed on 5/9/17) . She also had  mastectomy (6/26/17) and then 4 cycles of adjuvant Adriamycin (completed 9/12/17). In 10/2017, she developed L supraclavicular lymphadenopathy which FNA confirmed as reoccurrence of previously treated breast cancer.   She has had 3 new cycles of paclitaxel and immunomodulator.  Last paclitaxel was January 3.  Patient doing much better on IV steroids so will continue.  Awaiting skin biopsy results. Patient hesitant about muscle biopsy.

## 2019-01-25 NOTE — PROGRESS NOTES
[facility]  Hematology/Oncology  Progress Note    Patient Name: Ermelinda Verde  Admission Date: 1/22/2019  Hospital Length of Stay: 3 days  Code Status: Full Code     Subjective:     HPI:  Ms Verde is a 57 yo F with a prior diagnosis of L sided breast cancer, s/p 3 cycles of nab-paclitaxel and atezolizumab who presents from clinic with a roughly week long, multi-day history of proximal muscle weakness in the shoulder girdle muscles, bilateral and associated with mild tenderness. She reports generalized weakness, but strength impairment with the use of her upper extremities more than lower extremities, and her ADLs are intact. Her last PET scan in December 2018 showed almost complete resolution of her adenopathy, and she had been complaining of a rash, which had been attributed to Nab paclitaxel. Last week, 1/15, she had a CT neck/ since there was concern for facial swelling per symptoms, and the CT showed lymphadenopathy without airway or vascular compromise. 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 Most recently, the patient had been on 60mg of prednisone with a taper and had noted swelling, proximal weakness. She also notes some progressive swallowing difficulty, but attributes this to candidal thrush for which she takes nystatin scheduled. She reports poor PO intake preceding admission, dark, mario colored urine, but denies fevers or joint pain.      Onc History per Dr. Reyna:   She developed a palpable abnormality in her left breast in January 2017 which she noted on self-examination.  A diagnostic mammogram on January 19 showed a greater than 1 cm nodule in the upper outer portion of left breast.  By ultrasound this was lobulated and hypoechoic measuring 1.75 x 1.51 x 1.96 cm.     On January 24,  2017 a core needle biopsy was performed which showed infiltrating ductal carcinoma, high grade.  The tumor was ER negative, KS negative, and HER-2 negative.  A follow-up ultrasound on December 6 showed 2.5 x 2.2 x 1.5 cm left breast mass.  There was no abnormality noted in the left axilla.     She underwent sentinel lymph node biopsy on February 22.  That showed 4 negative lymph nodes.     She had 4 cycles of  Wilbur-adjuvantTaxotere and Cytoxan completed  on 5/9/17.     On June 26 she underwent left mastectomy.  That revealed 2 foci of invasive high-grade carcinoma measuring 14 mm and 1.5 mm.  Margins were negative.     She completed 4 cycles of adjuvant Adriamycin on September 12, 2017.     In October, she developed some left supraclavicular lymphadenopathy which turned out to be recurrence.     A fine-needle aspirate of the lymph node was performed on October 19th.  That showed metastatic carcinoma consistent with breast primary which was ER negative, KS negative and HER 2-negative.           Interval History: Patient seen today, still with significant upper extremity swelling and weakness. No new skin lesions. BM this morning. Good PO intake. Facial swelling stable. No lower extremity weakness. S/p skin biopsy 1/24 results pending.    Oncology Treatment Plan:   OP BREAST DOCETAXEL Q3W    Medications:  Continuous Infusions:   sodium chloride 0.9% 125 mL/hr at 01/25/19 0414     Scheduled Meds:   amLODIPine  10 mg Oral Daily    calcium carbonate  500 mg Oral Daily    cetirizine  10 mg Oral Daily    cyanocobalamin  500 mcg Oral Daily    enoxaparin  40 mg Subcutaneous Daily    escitalopram oxalate  10 mg Oral Daily    famotidine  20 mg Oral BID    fluticasone  1 spray Each Nare Daily    methylPREDNISolone sodium succinate  125 mg Intravenous BID    nystatin  500,000 Units Oral QID    vitamin D  1,000 Units Oral Daily     PRN Meds:acetaminophen, ALPRAZolam, dextrose 50%, dextrose 50%, glucagon (human  recombinant), glucose, glucose, insulin aspart U-100, ondansetron, oxyCODONE, polyethylene glycol, promethazine, sodium chloride 0.9%     Review of Systems   Constitutional: Positive for fatigue. Negative for activity change, appetite change, chills, diaphoresis, fever and unexpected weight change.   HENT: Positive for facial swelling. Negative for congestion, ear discharge, hearing loss, postnasal drip, sinus pressure, sneezing, sore throat and tinnitus.    Eyes: Negative for photophobia, pain and redness.   Respiratory: Negative for apnea, cough, choking, chest tightness, shortness of breath and stridor.    Cardiovascular: Negative for chest pain, palpitations and leg swelling.   Gastrointestinal: Negative for abdominal pain, anal bleeding, constipation, diarrhea, nausea and rectal pain.   Endocrine: Negative for polyuria.   Genitourinary: Negative for dysuria and hematuria.   Musculoskeletal: Positive for myalgias. Negative for arthralgias, back pain, gait problem, joint swelling, neck pain and neck stiffness.   Skin: Positive for rash. Negative for color change and pallor.   Allergic/Immunologic: Negative for immunocompromised state.   Neurological: Positive for weakness. Negative for dizziness, seizures and numbness.   Hematological: Positive for adenopathy. Does not bruise/bleed easily.   Psychiatric/Behavioral: Negative for agitation and behavioral problems.     Objective:     Vital Signs (Most Recent):  Temp: 97.1 °F (36.2 °C) (01/25/19 0721)  Pulse: 99 (01/25/19 0721)  Resp: 18 (01/25/19 0721)  BP: (!) 140/74 (01/25/19 0721)  SpO2: 98 % (01/25/19 0721) Vital Signs (24h Range):  Temp:  [97.1 °F (36.2 °C)-98.2 °F (36.8 °C)] 97.1 °F (36.2 °C)  Pulse:  [] 99  Resp:  [16-20] 18  SpO2:  [98 %-100 %] 98 %  BP: (140-159)/(74-81) 140/74     Weight: 85.1 kg (187 lb 9.8 oz)  Body mass index is 31.22 kg/m².  Body surface area is 1.98 meters squared.      Intake/Output Summary (Last 24 hours) at 1/25/2019  0906  Last data filed at 1/25/2019 0634  Gross per 24 hour   Intake 2986 ml   Output 3100 ml   Net -114 ml       Physical Exam   Constitutional: She is oriented to person, place, and time. She appears well-developed and well-nourished. No distress.   HENT:   Head: Atraumatic.   Nose: Nose normal.   Mouth/Throat: Oropharyngeal exudate (white tongue exudate) present.   Face appears swollen   Eyes: Conjunctivae and EOM are normal. Pupils are equal, round, and reactive to light. Right eye exhibits no discharge. Left eye exhibits no discharge. No scleral icterus.   Neck: Normal range of motion. Neck supple. No tracheal deviation present.   Cardiovascular: Normal rate, regular rhythm and intact distal pulses. Exam reveals no gallop and no friction rub.   Murmur (systolic murmur appreciated over left costal margin) heard.  Pulmonary/Chest: Effort normal and breath sounds normal. No respiratory distress. She has no wheezes. She has no rales. She exhibits no tenderness.   Abdominal: Soft. Bowel sounds are normal. She exhibits no distension and no mass. There is no tenderness. There is no rebound and no guarding.   Musculoskeletal: Normal range of motion. She exhibits edema (facial edema, upper extremity edema b/l). She exhibits no tenderness or deformity.   Neurological: She is alert and oriented to person, place, and time. No cranial nerve deficit or sensory deficit.     3/5 shoulder strength on abduction; range of motion limited by weakness  5/5 extension of forearms strength against resistance  5/5 lower extremity extensors     Skin: Skin is warm and dry. Capillary refill takes less than 2 seconds. No rash noted. She is not diaphoretic. No erythema. No pallor.   Psychiatric: She has a normal mood and affect.       Significant Labs:   CBC:   Recent Labs   Lab 01/24/19  0340 01/25/19  0420   WBC 9.03 18.76*   HGB 9.8* 10.3*   HCT 30.7* 32.6*    253    and CMP:   Recent Labs   Lab 01/24/19  0340 01/25/19  0420   NA  138 138   K 3.4* 3.5    104   CO2 24 26   * 136*   BUN 7 10   CREATININE 0.5 0.6   CALCIUM 8.3* 8.7   PROT 5.8* 6.3   ALBUMIN 2.5* 2.7*   BILITOT 0.3 0.4   ALKPHOS 77 83   * 181*   ALT 57* 59*   ANIONGAP 7* 8   EGFRNONAA >60.0 >60.0       Diagnostic Results:  I have reviewed and interpreted all pertinent imaging results/findings within the past 24 hours.   MRI Humerus W WO Contrast Left   Final Result   Abnormal      1. Diffuse edema and enhancement of the visualized left upper extremity musculature, most pronounced proximally, most notably of the deltoid and biceps musculature as detailed above.  Findings are nonspecific although could relate to a nonspecific myelitis particularly in light of patient's reported history.  Clinical correlation with appropriate history and lab markers advised.   2. No evidence of abscess or osteomyelitis.   This report was flagged in Epic as abnormal.         Electronically signed by: Lynn Amato MD   Date:    01/23/2019   Time:    23:17      Chest X-ray, PA And Lateral   Final Result   Abnormal      Port catheter with its tip directed toward the innominate vein.  Significant dextroscoliosis.      Surgical staple overlies the left upper quadrant appears new compared to prior.  This is an uncertain location on this single projection.      This report was flagged in Epic as abnormal.         Electronically signed by: Raghu Fung MD   Date:    01/23/2019   Time:    07:38            Assessment/Plan:     * Myositis    - DDX dermatomyositis de haylie vs related to immunotherapy  -trend CPK daily  -Stop IVF as patient eating, and arms more swollen  -CPK trending down  -myositis labs suspicious for myosits; rheum on board.   -F/u 1/24 skin biopsy. Pending results may need muscle biopsy by general surgery  -MRI left humerus suspicious for myositis  -per rheum recess, methylprednisolone 125 mg IV BID. Awaiting further recommendations pending biopsy results regarding steroid  taper vs steroid-sparing immunomodulating agent     Swelling of upper arm    - B/l ultrasound ordered to r/o UE DVT     Dermatitis    - Skin biopsy from 1/24 pending, appreciate dermatology recs     Candidiasis    -cont Nystatin swish and swallow.      Non-traumatic rhabdomyolysis    -see myositis. Continue supportive care, Cr/ electrolyte/ CPK monitoring and aggressive hydration. transaminitis on CMP is likely due to skeletal muscle rhabdo, not hepatic     Drug rash    - Unclear if symptoms related to chemotherapy     Pre-diabetes    -check a1c, 0-5u SSI while on steroids, add prandial insulin as warranted.      Adjustment disorder with depressed mood    -cont home SSRI     Vitamin B12 deficiency    -cont home B12 supplementation      Breast cancer of upper-outer quadrant of left female breast    -On January 24, 2017 a core needle biopsy was performed which showed infiltrating ductal carcinoma, high grade.  The tumor was ER negative, FL negative, and HER-2 negative.  -She had 4 cycles of  Wilbur-adjuvantTaxotere and Cytoxan completed  on 5/9/17.  -On June 26 she underwent left mastectomy.  That revealed 2 foci of invasive high-grade carcinoma measuring 14 mm and 1.5 mm.  Margins were negative.  -She completed 4 cycles of adjuvant Adriamycin on September 12, 2017.  -  A fine-needle aspirate of the lymph node was performed on October 19th.  That showed metastatic carcinoma consistent with breast primary which was ER negative, FL negative and HER 2-negative.    -Per rheumatology: hold paclitaxel agent and atezolizumb at this time - both can cause myositis as AE     Hypertension    -on home amlodipine-benazepril; will hold ACEi for now and observe renal function with CPK elevation in rhabdo.   -restarted amlodipine 10 mg qday         The following was staffed and discussed with supervising physician Dr. Dubon.    Abigail Isaac MD  Hematology/Oncology fellow

## 2019-01-25 NOTE — SUBJECTIVE & OBJECTIVE
Interval History: Patient was seen resting comfortably in bed.  Woke up this AM and felt increased swelling and heaviness of bilateral UE.  Was not able to brush hair or teeth.  Improvement with time.  Still unable to lift bilateral UE more than 45 degrees at the same time.  States that she is feeling slight improvement from yesterday.  Tolerating diet.     Current Facility-Administered Medications   Medication Frequency    acetaminophen tablet 650 mg Q4H PRN    ALPRAZolam tablet 0.25 mg TID PRN    amLODIPine tablet 10 mg Daily    calcium carbonate (OS-ESTELA) tablet 500 mg Daily    cetirizine tablet 10 mg Daily    cyanocobalamin tablet 500 mcg Daily    dextrose 50% injection 12.5 g PRN    dextrose 50% injection 25 g PRN    enoxaparin injection 40 mg Daily    escitalopram oxalate tablet 10 mg Daily    famotidine tablet 20 mg BID    fluticasone 50 mcg/actuation nasal spray 50 mcg Daily    glucagon (human recombinant) injection 1 mg PRN    glucose chewable tablet 16 g PRN    glucose chewable tablet 24 g PRN    insulin aspart U-100 pen 0-5 Units QID (AC + HS) PRN    methylPREDNISolone sodium succinate injection 125 mg BID    nystatin 100,000 unit/mL suspension 500,000 Units QID    ondansetron disintegrating tablet 8 mg Q8H PRN    oxyCODONE immediate release tablet 5 mg Q6H PRN    polyethylene glycol packet 17 g BID PRN    promethazine tablet 12.5 mg Q6H PRN    sodium chloride 0.9% flush 5 mL PRN    vitamin D 1000 units tablet 1,000 Units Daily     Objective:     Vital Signs (Most Recent):  Temp: 97.1 °F (36.2 °C) (01/25/19 1129)  Pulse: 82 (01/25/19 1129)  Resp: 18 (01/25/19 1129)  BP: 136/69 (01/25/19 1129)  SpO2: 99 % (01/25/19 1129)  O2 Device (Oxygen Therapy): room air (01/25/19 1129) Vital Signs (24h Range):  Temp:  [97.1 °F (36.2 °C)-98.2 °F (36.8 °C)] 97.1 °F (36.2 °C)  Pulse:  [] 82  Resp:  [16-20] 18  SpO2:  [98 %-100 %] 99 %  BP: (136-159)/(69-81) 136/69     Weight: 85.1 kg (187 lb  9.8 oz) (01/25/19 0400)  Body mass index is 31.22 kg/m².  Body surface area is 1.98 meters squared.      Intake/Output Summary (Last 24 hours) at 1/25/2019 1309  Last data filed at 1/25/2019 1115  Gross per 24 hour   Intake 1936.42 ml   Output 2400 ml   Net -463.58 ml       Physical Exam   Constitutional: She is oriented to person, place, and time and well-developed, well-nourished, and in no distress. No distress.   HENT:   Head: Normocephalic and atraumatic.   Right Ear: External ear normal.   Left Ear: External ear normal.   Bilateral orbital edema with heliotropic rash    Eyes: Conjunctivae and EOM are normal. Pupils are equal, round, and reactive to light.   Neck: Normal range of motion. Neck supple.   Cardiovascular: Normal rate, regular rhythm, normal heart sounds and intact distal pulses.    Pulmonary/Chest: Effort normal and breath sounds normal. No respiratory distress.   Abdominal: Soft. Bowel sounds are normal.   Neurological: She is alert and oriented to person, place, and time. GCS score is 15.   Skin: Skin is warm and dry. No rash noted.     Hyperpigmented non raised rash over nape of neck, elbows, knuckles (resemble Gottron's papules), thighs, and ankles.     Thigh rash is red with scaly skin.   Psychiatric: Mood, memory, affect and judgment normal.   Musculoskeletal: Normal range of motion. She exhibits edema. She exhibits no tenderness or deformity.   No signs of synovitis. ROM intact   Inability to move bilateral UE above 45 degrees simultaneously.  Able to move a little past 90 on each one alone  3/5 strength of bilateral UE muscles (L side weaker than R)  4/5 of bilateral LE muscles  Bilateral UE edema with skin thickening         Significant Labs:  Recent Results (from the past 48 hour(s))   Comprehensive Metabolic Panel (CMP)    Collection Time: 01/24/19  3:40 AM   Result Value Ref Range    Sodium 138 136 - 145 mmol/L    Potassium 3.4 (L) 3.5 - 5.1 mmol/L    Chloride 107 95 - 110 mmol/L    CO2  24 23 - 29 mmol/L    Glucose 137 (H) 70 - 110 mg/dL    BUN, Bld 7 6 - 20 mg/dL    Creatinine 0.5 0.5 - 1.4 mg/dL    Calcium 8.3 (L) 8.7 - 10.5 mg/dL    Total Protein 5.8 (L) 6.0 - 8.4 g/dL    Albumin 2.5 (L) 3.5 - 5.2 g/dL    Total Bilirubin 0.3 0.1 - 1.0 mg/dL    Alkaline Phosphatase 77 55 - 135 U/L     (H) 10 - 40 U/L    ALT 57 (H) 10 - 44 U/L    Anion Gap 7 (L) 8 - 16 mmol/L    eGFR if African American >60.0 >60 mL/min/1.73 m^2    eGFR if non African American >60.0 >60 mL/min/1.73 m^2   Magnesium    Collection Time: 01/24/19  3:40 AM   Result Value Ref Range    Magnesium 1.7 1.6 - 2.6 mg/dL   Phosphorus    Collection Time: 01/24/19  3:40 AM   Result Value Ref Range    Phosphorus 2.3 (L) 2.7 - 4.5 mg/dL   CK    Collection Time: 01/24/19  3:40 AM   Result Value Ref Range    CPK 2747 (H) 20 - 180 U/L   CBC auto differential    Collection Time: 01/24/19  3:40 AM   Result Value Ref Range    WBC 9.03 3.90 - 12.70 K/uL    RBC 3.59 (L) 4.00 - 5.40 M/uL    Hemoglobin 9.8 (L) 12.0 - 16.0 g/dL    Hematocrit 30.7 (L) 37.0 - 48.5 %    MCV 86 82 - 98 fL    MCH 27.3 27.0 - 31.0 pg    MCHC 31.9 (L) 32.0 - 36.0 g/dL    RDW 16.4 (H) 11.5 - 14.5 %    Platelets 197 150 - 350 K/uL    MPV 9.3 9.2 - 12.9 fL    Immature Granulocytes 0.7 (H) 0.0 - 0.5 %    Gran # (ANC) 7.7 1.8 - 7.7 K/uL    Immature Grans (Abs) 0.06 (H) 0.00 - 0.04 K/uL    Lymph # 0.7 (L) 1.0 - 4.8 K/uL    Mono # 0.6 0.3 - 1.0 K/uL    Eos # 0.0 0.0 - 0.5 K/uL    Baso # 0.01 0.00 - 0.20 K/uL    nRBC 0 0 /100 WBC    Gran% 84.8 (H) 38.0 - 73.0 %    Lymph% 8.2 (L) 18.0 - 48.0 %    Mono% 6.2 4.0 - 15.0 %    Eosinophil% 0.0 0.0 - 8.0 %    Basophil% 0.1 0.0 - 1.9 %    Differential Method Automated    Aldolase    Collection Time: 01/24/19  3:40 AM   Result Value Ref Range    Aldolase 12.7 (H) 1.2 - 7.6 U/L   Comprehensive Metabolic Panel (CMP)    Collection Time: 01/25/19  4:20 AM   Result Value Ref Range    Sodium 138 136 - 145 mmol/L    Potassium 3.5 3.5 - 5.1 mmol/L     Chloride 104 95 - 110 mmol/L    CO2 26 23 - 29 mmol/L    Glucose 136 (H) 70 - 110 mg/dL    BUN, Bld 10 6 - 20 mg/dL    Creatinine 0.6 0.5 - 1.4 mg/dL    Calcium 8.7 8.7 - 10.5 mg/dL    Total Protein 6.3 6.0 - 8.4 g/dL    Albumin 2.7 (L) 3.5 - 5.2 g/dL    Total Bilirubin 0.4 0.1 - 1.0 mg/dL    Alkaline Phosphatase 83 55 - 135 U/L     (H) 10 - 40 U/L    ALT 59 (H) 10 - 44 U/L    Anion Gap 8 8 - 16 mmol/L    eGFR if African American >60.0 >60 mL/min/1.73 m^2    eGFR if non African American >60.0 >60 mL/min/1.73 m^2   Magnesium    Collection Time: 01/25/19  4:20 AM   Result Value Ref Range    Magnesium 1.8 1.6 - 2.6 mg/dL   Phosphorus    Collection Time: 01/25/19  4:20 AM   Result Value Ref Range    Phosphorus 2.8 2.7 - 4.5 mg/dL   CK    Collection Time: 01/25/19  4:20 AM   Result Value Ref Range    CPK 2224 (H) 20 - 180 U/L   CBC auto differential    Collection Time: 01/25/19  4:20 AM   Result Value Ref Range    WBC 18.76 (H) 3.90 - 12.70 K/uL    RBC 3.91 (L) 4.00 - 5.40 M/uL    Hemoglobin 10.3 (L) 12.0 - 16.0 g/dL    Hematocrit 32.6 (L) 37.0 - 48.5 %    MCV 83 82 - 98 fL    MCH 26.3 (L) 27.0 - 31.0 pg    MCHC 31.6 (L) 32.0 - 36.0 g/dL    RDW 16.9 (H) 11.5 - 14.5 %    Platelets 253 150 - 350 K/uL    MPV 9.3 9.2 - 12.9 fL    Immature Granulocytes 1.5 (H) 0.0 - 0.5 %    Gran # (ANC) 16.0 (H) 1.8 - 7.7 K/uL    Immature Grans (Abs) 0.29 (H) 0.00 - 0.04 K/uL    Lymph # 1.4 1.0 - 4.8 K/uL    Mono # 1.0 0.3 - 1.0 K/uL    Eos # 0.0 0.0 - 0.5 K/uL    Baso # 0.02 0.00 - 0.20 K/uL    nRBC 0 0 /100 WBC    Gran% 85.5 (H) 38.0 - 73.0 %    Lymph% 7.5 (L) 18.0 - 48.0 %    Mono% 5.4 4.0 - 15.0 %    Eosinophil% 0.0 0.0 - 8.0 %    Basophil% 0.1 0.0 - 1.9 %    Differential Method Automated          Significant Imaging:  Imaging results within the past 24 hours have been reviewed.

## 2019-01-25 NOTE — ASSESSMENT & PLAN NOTE
"57yo F with history of L sided infiltrating ductal carcinoma of the L breast (dx on Jan 2017), HTN, depression, and gastritis was send from hem/onc clinic for evaluation of possible rhabdomyositis/myositis.     Labs: ESR 50, CRP 31.1, CPK 4562 (trending downward 2224), Aldolase 13.6.  , ALT 64.  UA normal.  CBC unremarkable.  +DARCY 1:640 speckled with negative profile    Patient was started on solumedrol 1g x 1 for possible treatment of myositis.  Slight improvement in symptoms.  Failed PO prednisone - worsening of symptoms    Case reports of docetaxel related inflammatory myositis had been documented. "...taxanes have been noted to cause disabling but transient arthralgia and myalgias; it is important to consider the possibility of inflammatory myopathy as a possible complication in patients undergoing treatment with these agents." - A case of docetaxel induced myositis and review of literature. Austin et al 2015. Case reports in Rheumatology   Case reports of atezolizumb (immunomodulator) cause of myositis  - Lucy et al 2017 "Myositis as an adverse even of immune checkpoint blockade for cancer therapy.  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   Dermatomyositis is usually associated with malignancy.  MRI of LUE showed edema of the deltoid and biceps.      Plan  - Hold paclitaxel agent and atezolizumb at this time - both can cause myositis as AE  - continue to monitor CPK/Aldolase daily  - Continue methylprednisone 125mg BID  - oral thrush - management as per primary  - pending myomarker panel   - pending skin biopsy result - called pathology, will try to expedite read  - If dermatology unable to differentiate rash and + myositis on MRI - consideration for muscle biopsy by general surgery  - recommendation to discontinue IVF - patient tolerating PO.  Will encourage increase fluid intake.  - Ordered C3/C4  "

## 2019-01-25 NOTE — ASSESSMENT & PLAN NOTE
"59yo F with history of L sided infiltrating ductal carcinoma of the L breast (dx on Jan 2017), HTN, depression, and gastritis was send from hem/onc clinic for evaluation of possible rhabdomyositis/myositis.     Labs: ESR 50, CRP 31.1, CPK 4562 (trending downward 2224), Aldolase 13.6.  , ALT 64.  UA normal.  CBC unremarkable.  +DARCY 1:640 speckled with negative profile.  Complements normal.    Patient was started on solumedrol 1g x 1 for possible treatment of myositis.  Slight improvement in symptoms.  Failed PO prednisone - worsening of symptoms    Case reports of docetaxel related inflammatory myositis had been documented. "...taxanes have been noted to cause disabling but transient arthralgia and myalgias; it is important to consider the possibility of inflammatory myopathy as a possible complication in patients undergoing treatment with these agents." - A case of docetaxel induced myositis and review of literature. Austin et al 2015. Case reports in Rheumatology   Case reports of atezolizumb (immunomodulator) cause of myositis  - Lucy et al 2017 "Myositis as an adverse even of immune checkpoint blockade for cancer therapy.  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   Dermatomyositis is usually associated with malignancy.  MRI of LUE showed edema of the deltoid and biceps.      Bilateral UE is swollen and puffy.  R/O DVT (bilateral UE US - negative).  Fluids restarted because of dysphagia (thrush vs myositis?). Patient is on high dose IV steroids and CPK continues to trend downward, so if it's myositis, improvement should be noted (just like bilateral UE).      Plan  - Hold paclitaxel agent and atezolizumb at this time - both can cause myositis as AE  - continue to monitor CPK/Aldolase daily  - Continue methylprednisone 125mg BID  - oral thrush - management as per primary  - pending barium swallow eval  - pending " myomarker panel   - pending skin biopsy result - called pathology, will try to expedite read  - If dermatology unable to differentiate rash and + myositis on MRI - consideration for muscle biopsy by general surgery (patient will probably need biopsy of the RUE because of L mastectomy with lymph node dissection).  - continue daily PT  - IVF now - can be discontinued when tolerating PO

## 2019-01-25 NOTE — SUBJECTIVE & OBJECTIVE
Interval History: Patient seen today, still with significant upper extremity swelling and weakness. No new skin lesions. BM this morning. Good PO intake. Facial swelling stable. No lower extremity weakness. S/p skin biopsy 1/24 results pending.    Oncology Treatment Plan:   OP BREAST DOCETAXEL Q3W    Medications:  Continuous Infusions:   sodium chloride 0.9% 125 mL/hr at 01/25/19 0414     Scheduled Meds:   amLODIPine  10 mg Oral Daily    calcium carbonate  500 mg Oral Daily    cetirizine  10 mg Oral Daily    cyanocobalamin  500 mcg Oral Daily    enoxaparin  40 mg Subcutaneous Daily    escitalopram oxalate  10 mg Oral Daily    famotidine  20 mg Oral BID    fluticasone  1 spray Each Nare Daily    methylPREDNISolone sodium succinate  125 mg Intravenous BID    nystatin  500,000 Units Oral QID    vitamin D  1,000 Units Oral Daily     PRN Meds:acetaminophen, ALPRAZolam, dextrose 50%, dextrose 50%, glucagon (human recombinant), glucose, glucose, insulin aspart U-100, ondansetron, oxyCODONE, polyethylene glycol, promethazine, sodium chloride 0.9%     Review of Systems   Constitutional: Positive for fatigue. Negative for activity change, appetite change, chills, diaphoresis, fever and unexpected weight change.   HENT: Positive for facial swelling. Negative for congestion, ear discharge, hearing loss, postnasal drip, sinus pressure, sneezing, sore throat and tinnitus.    Eyes: Negative for photophobia, pain and redness.   Respiratory: Negative for apnea, cough, choking, chest tightness, shortness of breath and stridor.    Cardiovascular: Negative for chest pain, palpitations and leg swelling.   Gastrointestinal: Negative for abdominal pain, anal bleeding, constipation, diarrhea, nausea and rectal pain.   Endocrine: Negative for polyuria.   Genitourinary: Negative for dysuria and hematuria.   Musculoskeletal: Positive for myalgias. Negative for arthralgias, back pain, gait problem, joint swelling, neck pain and  neck stiffness.   Skin: Positive for rash. Negative for color change and pallor.   Allergic/Immunologic: Negative for immunocompromised state.   Neurological: Positive for weakness. Negative for dizziness, seizures and numbness.   Hematological: Positive for adenopathy. Does not bruise/bleed easily.   Psychiatric/Behavioral: Negative for agitation and behavioral problems.     Objective:     Vital Signs (Most Recent):  Temp: 97.1 °F (36.2 °C) (01/25/19 0721)  Pulse: 99 (01/25/19 0721)  Resp: 18 (01/25/19 0721)  BP: (!) 140/74 (01/25/19 0721)  SpO2: 98 % (01/25/19 0721) Vital Signs (24h Range):  Temp:  [97.1 °F (36.2 °C)-98.2 °F (36.8 °C)] 97.1 °F (36.2 °C)  Pulse:  [] 99  Resp:  [16-20] 18  SpO2:  [98 %-100 %] 98 %  BP: (140-159)/(74-81) 140/74     Weight: 85.1 kg (187 lb 9.8 oz)  Body mass index is 31.22 kg/m².  Body surface area is 1.98 meters squared.      Intake/Output Summary (Last 24 hours) at 1/25/2019 0906  Last data filed at 1/25/2019 0634  Gross per 24 hour   Intake 2986 ml   Output 3100 ml   Net -114 ml       Physical Exam   Constitutional: She is oriented to person, place, and time. She appears well-developed and well-nourished. No distress.   HENT:   Head: Atraumatic.   Nose: Nose normal.   Mouth/Throat: Oropharyngeal exudate (white tongue exudate) present.   Face appears swollen   Eyes: Conjunctivae and EOM are normal. Pupils are equal, round, and reactive to light. Right eye exhibits no discharge. Left eye exhibits no discharge. No scleral icterus.   Neck: Normal range of motion. Neck supple. No tracheal deviation present.   Cardiovascular: Normal rate, regular rhythm and intact distal pulses. Exam reveals no gallop and no friction rub.   Murmur (systolic murmur appreciated over left costal margin) heard.  Pulmonary/Chest: Effort normal and breath sounds normal. No respiratory distress. She has no wheezes. She has no rales. She exhibits no tenderness.   Abdominal: Soft. Bowel sounds are normal.  She exhibits no distension and no mass. There is no tenderness. There is no rebound and no guarding.   Musculoskeletal: Normal range of motion. She exhibits edema (facial edema, upper extremity edema b/l). She exhibits no tenderness or deformity.   Neurological: She is alert and oriented to person, place, and time. No cranial nerve deficit or sensory deficit.     3/5 shoulder strength on abduction; range of motion limited by weakness  5/5 extension of forearms strength against resistance  5/5 lower extremity extensors     Skin: Skin is warm and dry. Capillary refill takes less than 2 seconds. No rash noted. She is not diaphoretic. No erythema. No pallor.   Psychiatric: She has a normal mood and affect.       Significant Labs:   CBC:   Recent Labs   Lab 01/24/19 0340 01/25/19  0420   WBC 9.03 18.76*   HGB 9.8* 10.3*   HCT 30.7* 32.6*    253    and CMP:   Recent Labs   Lab 01/24/19 0340 01/25/19  0420    138   K 3.4* 3.5    104   CO2 24 26   * 136*   BUN 7 10   CREATININE 0.5 0.6   CALCIUM 8.3* 8.7   PROT 5.8* 6.3   ALBUMIN 2.5* 2.7*   BILITOT 0.3 0.4   ALKPHOS 77 83   * 181*   ALT 57* 59*   ANIONGAP 7* 8   EGFRNONAA >60.0 >60.0       Diagnostic Results:  I have reviewed and interpreted all pertinent imaging results/findings within the past 24 hours.   MRI Humerus W WO Contrast Left   Final Result   Abnormal      1. Diffuse edema and enhancement of the visualized left upper extremity musculature, most pronounced proximally, most notably of the deltoid and biceps musculature as detailed above.  Findings are nonspecific although could relate to a nonspecific myelitis particularly in light of patient's reported history.  Clinical correlation with appropriate history and lab markers advised.   2. No evidence of abscess or osteomyelitis.   This report was flagged in Epic as abnormal.         Electronically signed by: Lynn Amato MD   Date:    01/23/2019   Time:    23:17      Chest X-ray,  PA And Lateral   Final Result   Abnormal      Port catheter with its tip directed toward the innominate vein.  Significant dextroscoliosis.      Surgical staple overlies the left upper quadrant appears new compared to prior.  This is an uncertain location on this single projection.      This report was flagged in Epic as abnormal.         Electronically signed by: Raghu Fung MD   Date:    01/23/2019   Time:    07:38

## 2019-01-25 NOTE — ASSESSMENT & PLAN NOTE
-On January 24, 2017 a core needle biopsy was performed which showed infiltrating ductal carcinoma, high grade.  The tumor was ER negative, AZ negative, and HER-2 negative.  -She had 4 cycles of  Wilbur-adjuvantTaxotere and Cytoxan completed  on 5/9/17.  -On June 26 she underwent left mastectomy.  That revealed 2 foci of invasive high-grade carcinoma measuring 14 mm and 1.5 mm.  Margins were negative.  -She completed 4 cycles of adjuvant Adriamycin on September 12, 2017.  -  A fine-needle aspirate of the lymph node was performed on October 19th.  That showed metastatic carcinoma consistent with breast primary which was ER negative, AZ negative and HER 2-negative.    -Per rheumatology: hold paclitaxel agent and atezolizumb at this time - both can cause myositis as AE

## 2019-01-25 NOTE — PLAN OF CARE
Problem: Adult Inpatient Plan of Care  Goal: Plan of Care Review  Outcome: Ongoing (interventions implemented as appropriate)  Pt AAO x4.  Denies N,V,D, pain.  Stable vital signs.  Tolerated all medications without difficulty.  Spouse at bedside.  IV hydration.  Safety precautions in place.  Call light, bedside table, telephone, personal items in place.  Will continue to monitor.

## 2019-01-26 PROBLEM — R13.10 DYSPHAGIA: Status: ACTIVE | Noted: 2019-01-26

## 2019-01-26 LAB
ALBUMIN SERPL BCP-MCNC: 2.6 G/DL
ALP SERPL-CCNC: 79 U/L
ALT SERPL W/O P-5'-P-CCNC: 58 U/L
ANION GAP SERPL CALC-SCNC: 9 MMOL/L
AST SERPL-CCNC: 163 U/L
BASOPHILS # BLD AUTO: 0.01 K/UL
BASOPHILS NFR BLD: 0.1 %
BILIRUB SERPL-MCNC: 0.4 MG/DL
BUN SERPL-MCNC: 13 MG/DL
CALCIUM SERPL-MCNC: 8.6 MG/DL
CHLORIDE SERPL-SCNC: 102 MMOL/L
CK SERPL-CCNC: 1947 U/L
CO2 SERPL-SCNC: 27 MMOL/L
CREAT SERPL-MCNC: 0.6 MG/DL
DIFFERENTIAL METHOD: ABNORMAL
EOSINOPHIL # BLD AUTO: 0 K/UL
EOSINOPHIL NFR BLD: 0 %
ERYTHROCYTE [DISTWIDTH] IN BLOOD BY AUTOMATED COUNT: 16.9 %
EST. GFR  (AFRICAN AMERICAN): >60 ML/MIN/1.73 M^2
EST. GFR  (NON AFRICAN AMERICAN): >60 ML/MIN/1.73 M^2
GLUCOSE SERPL-MCNC: 122 MG/DL
HCT VFR BLD AUTO: 30.2 %
HGB BLD-MCNC: 9.7 G/DL
IMM GRANULOCYTES # BLD AUTO: 0.27 K/UL
IMM GRANULOCYTES NFR BLD AUTO: 1.9 %
LYMPHOCYTES # BLD AUTO: 1 K/UL
LYMPHOCYTES NFR BLD: 6.9 %
MAGNESIUM SERPL-MCNC: 1.9 MG/DL
MCH RBC QN AUTO: 26.6 PG
MCHC RBC AUTO-ENTMCNC: 32.1 G/DL
MCV RBC AUTO: 83 FL
MONOCYTES # BLD AUTO: 0.9 K/UL
MONOCYTES NFR BLD: 6.3 %
NEUTROPHILS # BLD AUTO: 11.8 K/UL
NEUTROPHILS NFR BLD: 84.8 %
NRBC BLD-RTO: 0 /100 WBC
PHOSPHATE SERPL-MCNC: 2.9 MG/DL
PLATELET # BLD AUTO: 216 K/UL
PMV BLD AUTO: 9.3 FL
POTASSIUM SERPL-SCNC: 3.3 MMOL/L
PROT SERPL-MCNC: 5.8 G/DL
RBC # BLD AUTO: 3.65 M/UL
SODIUM SERPL-SCNC: 138 MMOL/L
WBC # BLD AUTO: 13.94 K/UL

## 2019-01-26 PROCEDURE — 80053 COMPREHEN METABOLIC PANEL: CPT

## 2019-01-26 PROCEDURE — 25000003 PHARM REV CODE 250: Performed by: STUDENT IN AN ORGANIZED HEALTH CARE EDUCATION/TRAINING PROGRAM

## 2019-01-26 PROCEDURE — 82085 ASSAY OF ALDOLASE: CPT

## 2019-01-26 PROCEDURE — 63600175 PHARM REV CODE 636 W HCPCS: Performed by: STUDENT IN AN ORGANIZED HEALTH CARE EDUCATION/TRAINING PROGRAM

## 2019-01-26 PROCEDURE — 63600175 PHARM REV CODE 636 W HCPCS: Performed by: INTERNAL MEDICINE

## 2019-01-26 PROCEDURE — 99222 PR INITIAL HOSPITAL CARE,LEVL II: ICD-10-PCS | Mod: ,,, | Performed by: INTERNAL MEDICINE

## 2019-01-26 PROCEDURE — 84100 ASSAY OF PHOSPHORUS: CPT

## 2019-01-26 PROCEDURE — 99222 1ST HOSP IP/OBS MODERATE 55: CPT | Mod: ,,, | Performed by: INTERNAL MEDICINE

## 2019-01-26 PROCEDURE — 99233 PR SUBSEQUENT HOSPITAL CARE,LEVL III: ICD-10-PCS | Mod: ,,, | Performed by: INTERNAL MEDICINE

## 2019-01-26 PROCEDURE — 99233 SBSQ HOSP IP/OBS HIGH 50: CPT | Mod: ,,, | Performed by: INTERNAL MEDICINE

## 2019-01-26 PROCEDURE — 25000242 PHARM REV CODE 250 ALT 637 W/ HCPCS: Performed by: INTERNAL MEDICINE

## 2019-01-26 PROCEDURE — 83735 ASSAY OF MAGNESIUM: CPT

## 2019-01-26 PROCEDURE — 82550 ASSAY OF CK (CPK): CPT

## 2019-01-26 PROCEDURE — 92610 EVALUATE SWALLOWING FUNCTION: CPT

## 2019-01-26 PROCEDURE — 20600001 HC STEP DOWN PRIVATE ROOM

## 2019-01-26 PROCEDURE — 97535 SELF CARE MNGMENT TRAINING: CPT

## 2019-01-26 PROCEDURE — 25000003 PHARM REV CODE 250: Performed by: INTERNAL MEDICINE

## 2019-01-26 PROCEDURE — 85025 COMPLETE CBC W/AUTO DIFF WBC: CPT

## 2019-01-26 RX ORDER — SODIUM CHLORIDE 9 MG/ML
INJECTION, SOLUTION INTRAVENOUS CONTINUOUS
Status: ACTIVE | OUTPATIENT
Start: 2019-01-26 | End: 2019-01-27

## 2019-01-26 RX ORDER — FLUCONAZOLE 200 MG/1
200 TABLET ORAL DAILY
Status: DISCONTINUED | OUTPATIENT
Start: 2019-01-26 | End: 2019-01-28

## 2019-01-26 RX ORDER — POTASSIUM CHLORIDE 7.45 MG/ML
10 INJECTION INTRAVENOUS
Status: COMPLETED | OUTPATIENT
Start: 2019-01-26 | End: 2019-01-26

## 2019-01-26 RX ADMIN — CYANOCOBALAMIN TAB 250 MCG 500 MCG: 250 TAB at 09:01

## 2019-01-26 RX ADMIN — NYSTATIN 500000 UNITS: 500000 SUSPENSION ORAL at 04:01

## 2019-01-26 RX ADMIN — VITAMIN D, TAB 1000IU (100/BT) 1000 UNITS: 25 TAB at 09:01

## 2019-01-26 RX ADMIN — POTASSIUM CHLORIDE 10 MEQ: 10 INJECTION, SOLUTION INTRAVENOUS at 11:01

## 2019-01-26 RX ADMIN — NYSTATIN 500000 UNITS: 500000 SUSPENSION ORAL at 08:01

## 2019-01-26 RX ADMIN — METHYLPREDNISOLONE SODIUM SUCCINATE 125 MG: 125 INJECTION, POWDER, FOR SOLUTION INTRAMUSCULAR; INTRAVENOUS at 08:01

## 2019-01-26 RX ADMIN — NYSTATIN 500000 UNITS: 500000 SUSPENSION ORAL at 01:01

## 2019-01-26 RX ADMIN — SODIUM CHLORIDE 125 ML/HR: 0.9 INJECTION, SOLUTION INTRAVENOUS at 09:01

## 2019-01-26 RX ADMIN — AMLODIPINE BESYLATE 10 MG: 10 TABLET ORAL at 09:01

## 2019-01-26 RX ADMIN — FLUCONAZOLE 200 MG: 200 TABLET ORAL at 01:01

## 2019-01-26 RX ADMIN — NYSTATIN 500000 UNITS: 500000 SUSPENSION ORAL at 10:01

## 2019-01-26 RX ADMIN — ENOXAPARIN SODIUM 40 MG: 100 INJECTION SUBCUTANEOUS at 04:01

## 2019-01-26 RX ADMIN — FAMOTIDINE 20 MG: 20 TABLET ORAL at 09:01

## 2019-01-26 RX ADMIN — FLUTICASONE PROPIONATE 50 MCG: 50 SPRAY, METERED NASAL at 10:01

## 2019-01-26 RX ADMIN — SODIUM CHLORIDE 125 ML/HR: 0.9 INJECTION, SOLUTION INTRAVENOUS at 07:01

## 2019-01-26 RX ADMIN — CALCIUM 500 MG: 500 TABLET ORAL at 09:01

## 2019-01-26 RX ADMIN — CETIRIZINE HYDROCHLORIDE 10 MG: 10 TABLET, FILM COATED ORAL at 09:01

## 2019-01-26 RX ADMIN — METHYLPREDNISOLONE SODIUM SUCCINATE 125 MG: 125 INJECTION, POWDER, FOR SOLUTION INTRAMUSCULAR; INTRAVENOUS at 09:01

## 2019-01-26 NOTE — PROGRESS NOTES
01/26/19 0353   Vital Signs   Temp 98.2 °F (36.8 °C)   Temp src Oral   Pulse 97   Heart Rate Source Monitor   Resp 18   SpO2 97 %   Pulse Oximetry Type Intermittent   O2 Device (Oxygen Therapy) room air   BP (!) 169/78   MAP (mmHg) 112   BP Location Right arm   BP Method Automatic   Patient Position Lying   Notified Dr. Claudio of patient's blood pressure, no new orders at this time, will continue to monitor.

## 2019-01-26 NOTE — PLAN OF CARE
Problem: SLP Goal  Goal: SLP Goal  Speech-Language Pathology Goals  Goals expected to be met by 2/2:   1) Pt will participate in ongoing swallow assessment to determine appropriateness of po diet.   2) Pt will participate in MBSS to further assess swallow function.   Outcome: Ongoing (interventions implemented as appropriate)      Pt seen for swallow assessment. ST recommending strict NPO with only necessary po medications crushed and buried in pudding. ST recommending MBSS for Monday 1/28 to objectively rule out aspiration. SLP communicated findings with nurse and MD who verbalized understanding.     Rhonda Krishnamurthy M.S., CCC-SLP  Speech Language Pathologist  [phone] - pager   1/26/2019

## 2019-01-26 NOTE — PLAN OF CARE
Problem: Adult Inpatient Plan of Care  Goal: Plan of Care Review  Outcome: Ongoing (interventions implemented as appropriate)  Plan of care reviewed at the start of the shift. NPO because of difficulty swallowing, speech consulted for evaluation. Blood pressure elevated during shift, MD aware. Instructed patient to call for assistance. Bed low and locked, call bell within reach, nonskid socks on, pt verbalized understanding. Infection, pressure ulcer, and fall risks precautions maintained. Will continue to monitor.

## 2019-01-26 NOTE — PROGRESS NOTES
[facility]  Hematology/Oncology  Progress Note    Patient Name: Ermelinda Verde  Admission Date: 1/22/2019  Hospital Length of Stay: 4 days  Code Status: Full Code     Subjective:     HPI:  Ms Verde is a 59 yo F with a prior diagnosis of L sided breast cancer, s/p 3 cycles of nab-paclitaxel and atezolizumab who presents from clinic with a roughly week long, multi-day history of proximal muscle weakness in the shoulder girdle muscles, bilateral and associated with mild tenderness. She reports generalized weakness, but strength impairment with the use of her upper extremities more than lower extremities, and her ADLs are intact. Her last PET scan in December 2018 showed almost complete resolution of her adenopathy, and she had been complaining of a rash, which had been attributed to Nab paclitaxel. Last week, 1/15, she had a CT neck/ since there was concern for facial swelling per symptoms, and the CT showed lymphadenopathy without airway or vascular compromise. 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 Most recently, the patient had been on 60mg of prednisone with a taper and had noted swelling, proximal weakness. She also notes some progressive swallowing difficulty, but attributes this to candidal thrush for which she takes nystatin scheduled. She reports poor PO intake preceding admission, dark, mario colored urine, but denies fevers or joint pain.      Onc History per Dr. Reyna:   She developed a palpable abnormality in her left breast in January 2017 which she noted on self-examination.  A diagnostic mammogram on January 19 showed a greater than 1 cm nodule in the upper outer portion of left breast.  By ultrasound this was lobulated and hypoechoic measuring 1.75 x 1.51 x 1.96 cm.     On January 24,  2017 a core needle biopsy was performed which showed infiltrating ductal carcinoma, high grade.  The tumor was ER negative, IL negative, and HER-2 negative.  A follow-up ultrasound on December 6 showed 2.5 x 2.2 x 1.5 cm left breast mass.  There was no abnormality noted in the left axilla.     She underwent sentinel lymph node biopsy on February 22.  That showed 4 negative lymph nodes.     She had 4 cycles of  Wilbur-adjuvantTaxotere and Cytoxan completed  on 5/9/17.     On June 26 she underwent left mastectomy.  That revealed 2 foci of invasive high-grade carcinoma measuring 14 mm and 1.5 mm.  Margins were negative.     She completed 4 cycles of adjuvant Adriamycin on September 12, 2017.     In October, she developed some left supraclavicular lymphadenopathy which turned out to be recurrence.     A fine-needle aspirate of the lymph node was performed on October 19th.  That showed metastatic carcinoma consistent with breast primary which was ER negative, IL negative and HER 2-negative.           Interval History: patient endorsed a worsening ability to swallow. Made NPO. Endorses improving weakness.     Oncology Treatment Plan:   OP BREAST DOCETAXEL Q3W    Medications:  Continuous Infusions:   sodium chloride 0.9% 125 mL/hr (01/26/19 0949)     Scheduled Meds:   amLODIPine  10 mg Oral Daily    calcium carbonate  500 mg Oral Daily    cetirizine  10 mg Oral Daily    cyanocobalamin  500 mcg Oral Daily    enoxaparin  40 mg Subcutaneous Daily    escitalopram oxalate  10 mg Oral Daily    famotidine  20 mg Oral BID    fluconazole  200 mg Oral Daily    fluticasone  1 spray Each Nare Daily    methylPREDNISolone sodium succinate  125 mg Intravenous BID    nystatin  500,000 Units Oral QID    vitamin D  1,000 Units Oral Daily     PRN Meds:acetaminophen, ALPRAZolam, dextrose 50%, dextrose 50%, glucagon (human recombinant), glucose, glucose, insulin aspart U-100, ondansetron, oxyCODONE, polyethylene glycol, promethazine,  sodium chloride 0.9%     Review of Systems   Constitutional: Positive for fatigue. Negative for activity change, appetite change, chills, diaphoresis, fever and unexpected weight change.   HENT: Positive for facial swelling. Negative for congestion, ear discharge, hearing loss, postnasal drip, sinus pressure, sneezing, sore throat and tinnitus.    Eyes: Negative for photophobia, pain and redness.   Respiratory: Negative for apnea, cough, choking, chest tightness, shortness of breath and stridor.    Cardiovascular: Negative for chest pain, palpitations and leg swelling.   Gastrointestinal: Negative for abdominal pain, anal bleeding, constipation, diarrhea, nausea and rectal pain.   Endocrine: Negative for polyuria.   Genitourinary: Negative for dysuria and hematuria.   Musculoskeletal: Positive for myalgias. Negative for arthralgias, back pain, gait problem, joint swelling, neck pain and neck stiffness.   Skin: Positive for rash. Negative for color change and pallor.   Allergic/Immunologic: Negative for immunocompromised state.   Neurological: Positive for weakness. Negative for dizziness, seizures and numbness.   Hematological: Positive for adenopathy. Does not bruise/bleed easily.   Psychiatric/Behavioral: Negative for agitation and behavioral problems.     Objective:     Vital Signs (Most Recent):  Temp: 98 °F (36.7 °C) (01/26/19 1223)  Pulse: 85 (01/26/19 1223)  Resp: 18 (01/26/19 1223)  BP: (!) 163/81 (01/26/19 1223)  SpO2: 100 % (01/26/19 1223) Vital Signs (24h Range):  Temp:  [97.5 °F (36.4 °C)-98.5 °F (36.9 °C)] 98 °F (36.7 °C)  Pulse:  [] 85  Resp:  [17-20] 18  SpO2:  [96 %-100 %] 100 %  BP: (140-169)/(76-82) 163/81     Weight: 84.2 kg (185 lb 11.8 oz)  Body mass index is 30.91 kg/m².  Body surface area is 1.97 meters squared.      Intake/Output Summary (Last 24 hours) at 1/26/2019 1420  Last data filed at 1/26/2019 0945  Gross per 24 hour   Intake 420 ml   Output 3350 ml   Net -2930 ml       Physical  Exam   Constitutional: She is oriented to person, place, and time. She appears well-developed and well-nourished. No distress.   HENT:   Head: Atraumatic.   Nose: Nose normal.   Mouth/Throat: Oropharyngeal exudate (white tongue exudate) present.   Face appears swollen   Eyes: Conjunctivae and EOM are normal. Pupils are equal, round, and reactive to light. Right eye exhibits no discharge. Left eye exhibits no discharge. No scleral icterus.   Neck: Normal range of motion. Neck supple. No tracheal deviation present.   Cardiovascular: Normal rate, regular rhythm and intact distal pulses. Exam reveals no gallop and no friction rub.   Murmur (systolic murmur appreciated over left costal margin) heard.  Pulmonary/Chest: Effort normal and breath sounds normal. No respiratory distress. She has no wheezes. She has no rales. She exhibits no tenderness.   Abdominal: Soft. Bowel sounds are normal. She exhibits no distension and no mass. There is no tenderness. There is no rebound and no guarding.   Musculoskeletal: Normal range of motion. She exhibits edema (facial edema, upper extremity edema b/l). She exhibits no tenderness or deformity.   Neurological: She is alert and oriented to person, place, and time. No cranial nerve deficit or sensory deficit.     3/5 shoulder strength on abduction; range of motion limited by weakness  5/5 extension of forearms strength against resistance   Skin: Skin is warm and dry. Capillary refill takes less than 2 seconds. No rash noted. She is not diaphoretic. No erythema. No pallor.   Psychiatric: She has a normal mood and affect.       Significant Labs:   CBC:   Recent Labs   Lab 01/25/19  0420 01/26/19  0400   WBC 18.76* 13.94*   HGB 10.3* 9.7*   HCT 32.6* 30.2*    216    and CMP:   Recent Labs   Lab 01/25/19  0420 01/26/19  0400    138   K 3.5 3.3*    102   CO2 26 27   * 122*   BUN 10 13   CREATININE 0.6 0.6   CALCIUM 8.7 8.6*   PROT 6.3 5.8*   ALBUMIN 2.7* 2.6*    BILITOT 0.4 0.4   ALKPHOS 83 79   * 163*   ALT 59* 58*   ANIONGAP 8 9   EGFRNONAA >60.0 >60.0       Diagnostic Results:  I have reviewed and interpreted all pertinent imaging results/findings within the past 24 hours.    Assessment/Plan:     * Myositis    - DDX dermatomyositis de haylie vs related to immunotherapy  - trend CPK daily; downtrending  -restarted fluids 01/26 due to NPO  -CPK trending down  - myositis labs suspicious for myosits; rheum on board.   - F/u 1/24 skin biopsy. Pending results may need muscle biopsy by general surgery  -MRI left humerus suspicious for myositis  -per rheum recess, methylprednisolone 125 mg IV BID. Awaiting further recommendations pending biopsy results regarding steroid taper vs steroid-sparing immunomodulating agent     Dysphagia    Patient is experiencing difficulty swallowing that has been increasing in severity over last couple of days to the point where patient did not feel as if she could swallow  As of yesterday, SLP evaluated patient and determined that she should be NPO except for medications until a modified barium swallow could be performed. Possibly candidal esophagitis; patient has oral thrush.    Plan:  -follow up modified barium study  -continue to appreciate SLP recs  -continue nystatin mouthwash  -fluconazole 200 mg PO qday      Swelling of upper arm    -B/L U/S negative for DVTs  -appears less edematous than day prior  -continue to monitor     Dermatitis    - Skin biopsy from 1/24 pending, appreciate dermatology recs     Candidiasis    -cont Nystatin swish and swallow. Additionally, added fluconaozle 200 mg Po qday as possibility patient is experiencing esophagitis 2/2 to candida     Non-traumatic rhabdomyolysis    -see myositis. Continue supportive care, Cr/ electrolyte/ CPK monitoring and aggressive hydration. transaminitis on CMP is likely due to skeletal muscle rhabdo, not hepatic     Drug rash    - Unclear if symptoms related to chemotherapy      Pre-diabetes    -check a1c, 0-5u SSI while on steroids, add prandial insulin as warranted.      Adjustment disorder with depressed mood    -cont home SSRI     Vitamin B12 deficiency    -cont home B12 supplementation      Breast cancer of upper-outer quadrant of left female breast    -On January 24, 2017 a core needle biopsy was performed which showed infiltrating ductal carcinoma, high grade.  The tumor was ER negative, DE negative, and HER-2 negative.  -She had 4 cycles of  Wilbur-adjuvantTaxotere and Cytoxan completed  on 5/9/17.  -On June 26 she underwent left mastectomy.  That revealed 2 foci of invasive high-grade carcinoma measuring 14 mm and 1.5 mm.  Margins were negative.  -She completed 4 cycles of adjuvant Adriamycin on September 12, 2017.  -  A fine-needle aspirate of the lymph node was performed on October 19th.  That showed metastatic carcinoma consistent with breast primary which was ER negative, DE negative and HER 2-negative.    -Per rheumatology: hold paclitaxel agent and atezolizumb at this time - both can cause myositis      Hypertension    -on home amlodipine-benazepril; will hold ACEi for now and observe renal function with CPK elevation in rhabdo.     Plan:   amlodipine 10 mg qday              Som Mills MD  Hematology/Oncology  [facility]

## 2019-01-26 NOTE — ASSESSMENT & PLAN NOTE
- DDX dermatomyositis de haylie vs related to immunotherapy  - trend CPK daily; downtrending  -restarted fluids 01/26 due to NPO  -CPK trending down  - myositis labs suspicious for myosits; rheum on board.   - F/u 1/24 skin biopsy. Pending results may need muscle biopsy by general surgery  -MRI left humerus suspicious for myositis  -per rheum recess, methylprednisolone 125 mg IV BID. Awaiting further recommendations pending biopsy results regarding steroid taper vs steroid-sparing immunomodulating agent

## 2019-01-26 NOTE — ASSESSMENT & PLAN NOTE
Patient is experiencing difficulty swallowing that has been increasing in severity over last couple of days to the point where patient did not feel as if she could swallow  As of yesterday, SLP evaluated patient and determined that she should be NPO except for medications until a modified barium swallow could be performed. Possibly candidal esophagitis; patient has oral thrush.    Plan:  -follow up modified barium study  -continue to appreciate SLP recs  -continue nystatin mouthwash  -fluconazole 200 mg PO qday

## 2019-01-26 NOTE — ASSESSMENT & PLAN NOTE
-cont Nystatin swish and swallow. Additionally, added fluconaozle 200 mg Po qday as possibility patient is experiencing esophagitis 2/2 to candida

## 2019-01-26 NOTE — SUBJECTIVE & OBJECTIVE
"Interval History: Patient was seen sitting up chair.   She states that she was having difficulty swallowing with regurg yesterday afternoon.  Fluids was discontinued about of diffused swelling.  Swelling decreased after stopping of fluids and she felt improvement in strength this morning.  Fluids restarted because of dysphagia and patient continues to fell "heaviness" and decreased strength.     Current Facility-Administered Medications   Medication Frequency    0.9%  NaCl infusion Continuous    acetaminophen tablet 650 mg Q4H PRN    ALPRAZolam tablet 0.25 mg TID PRN    amLODIPine tablet 10 mg Daily    calcium carbonate (OS-ESTELA) tablet 500 mg Daily    cetirizine tablet 10 mg Daily    cyanocobalamin tablet 500 mcg Daily    dextrose 50% injection 12.5 g PRN    dextrose 50% injection 25 g PRN    enoxaparin injection 40 mg Daily    escitalopram oxalate tablet 10 mg Daily    famotidine tablet 20 mg BID    fluconazole tablet 200 mg Daily    fluticasone 50 mcg/actuation nasal spray 50 mcg Daily    glucagon (human recombinant) injection 1 mg PRN    glucose chewable tablet 16 g PRN    glucose chewable tablet 24 g PRN    insulin aspart U-100 pen 0-5 Units QID (AC + HS) PRN    methylPREDNISolone sodium succinate injection 125 mg BID    nystatin 100,000 unit/mL suspension 500,000 Units QID    ondansetron disintegrating tablet 8 mg Q8H PRN    oxyCODONE immediate release tablet 5 mg Q6H PRN    polyethylene glycol packet 17 g BID PRN    promethazine tablet 12.5 mg Q6H PRN    sodium chloride 0.9% flush 5 mL PRN    vitamin D 1000 units tablet 1,000 Units Daily     Objective:     Vital Signs (Most Recent):  Temp: 98 °F (36.7 °C) (01/26/19 1223)  Pulse: 85 (01/26/19 1223)  Resp: 18 (01/26/19 1223)  BP: (!) 163/81 (01/26/19 1223)  SpO2: 100 % (01/26/19 1223)  O2 Device (Oxygen Therapy): room air (01/26/19 0353) Vital Signs (24h Range):  Temp:  [97.5 °F (36.4 °C)-98.5 °F (36.9 °C)] 98 °F (36.7 °C)  Pulse:  " [] 85  Resp:  [17-20] 18  SpO2:  [96 %-100 %] 100 %  BP: (140-169)/(76-82) 163/81     Weight: 84.2 kg (185 lb 11.8 oz) (01/26/19 0400)  Body mass index is 30.91 kg/m².  Body surface area is 1.97 meters squared.      Intake/Output Summary (Last 24 hours) at 1/26/2019 1317  Last data filed at 1/26/2019 0945  Gross per 24 hour   Intake 900 ml   Output 3350 ml   Net -2450 ml       Physical Exam   Constitutional: She is oriented to person, place, and time and well-developed, well-nourished, and in no distress. No distress.   HENT:   Head: Normocephalic and atraumatic.   Right Ear: External ear normal.   Left Ear: External ear normal.   Bilateral orbital edema with heliotropic rash    Eyes: Conjunctivae and EOM are normal. Pupils are equal, round, and reactive to light.   Neck: Normal range of motion. Neck supple.   Cardiovascular: Normal rate, regular rhythm, normal heart sounds and intact distal pulses.    Pulmonary/Chest: Effort normal and breath sounds normal. No respiratory distress.   Abdominal: Soft. Bowel sounds are normal.   Neurological: She is alert and oriented to person, place, and time. GCS score is 15.   Skin: Skin is warm and dry. No rash noted.     Hyperpigmented non raised rash over nape of neck, elbows, knuckles (resemble Gottron's papules), thighs, and ankles.     Thigh rash is red with scaly skin.   Psychiatric: Mood, memory, affect and judgment normal.   Musculoskeletal: Normal range of motion. She exhibits edema. She exhibits no tenderness or deformity.   No signs of synovitis. ROM intact   Inability to move bilateral UE above 45 degrees simultaneously.  Able to move a little past 120 on each one alone  3/5 strength of bilateral UE muscles (L side weaker than R)  4/5 of bilateral LE muscles  Bilateral UE edema with skin thickening         Significant Labs:  Recent Results (from the past 48 hour(s))   Comprehensive Metabolic Panel (CMP)    Collection Time: 01/25/19  4:20 AM   Result Value Ref  Range    Sodium 138 136 - 145 mmol/L    Potassium 3.5 3.5 - 5.1 mmol/L    Chloride 104 95 - 110 mmol/L    CO2 26 23 - 29 mmol/L    Glucose 136 (H) 70 - 110 mg/dL    BUN, Bld 10 6 - 20 mg/dL    Creatinine 0.6 0.5 - 1.4 mg/dL    Calcium 8.7 8.7 - 10.5 mg/dL    Total Protein 6.3 6.0 - 8.4 g/dL    Albumin 2.7 (L) 3.5 - 5.2 g/dL    Total Bilirubin 0.4 0.1 - 1.0 mg/dL    Alkaline Phosphatase 83 55 - 135 U/L     (H) 10 - 40 U/L    ALT 59 (H) 10 - 44 U/L    Anion Gap 8 8 - 16 mmol/L    eGFR if African American >60.0 >60 mL/min/1.73 m^2    eGFR if non African American >60.0 >60 mL/min/1.73 m^2   Magnesium    Collection Time: 01/25/19  4:20 AM   Result Value Ref Range    Magnesium 1.8 1.6 - 2.6 mg/dL   Phosphorus    Collection Time: 01/25/19  4:20 AM   Result Value Ref Range    Phosphorus 2.8 2.7 - 4.5 mg/dL   CK    Collection Time: 01/25/19  4:20 AM   Result Value Ref Range    CPK 2224 (H) 20 - 180 U/L   CBC auto differential    Collection Time: 01/25/19  4:20 AM   Result Value Ref Range    WBC 18.76 (H) 3.90 - 12.70 K/uL    RBC 3.91 (L) 4.00 - 5.40 M/uL    Hemoglobin 10.3 (L) 12.0 - 16.0 g/dL    Hematocrit 32.6 (L) 37.0 - 48.5 %    MCV 83 82 - 98 fL    MCH 26.3 (L) 27.0 - 31.0 pg    MCHC 31.6 (L) 32.0 - 36.0 g/dL    RDW 16.9 (H) 11.5 - 14.5 %    Platelets 253 150 - 350 K/uL    MPV 9.3 9.2 - 12.9 fL    Immature Granulocytes 1.5 (H) 0.0 - 0.5 %    Gran # (ANC) 16.0 (H) 1.8 - 7.7 K/uL    Immature Grans (Abs) 0.29 (H) 0.00 - 0.04 K/uL    Lymph # 1.4 1.0 - 4.8 K/uL    Mono # 1.0 0.3 - 1.0 K/uL    Eos # 0.0 0.0 - 0.5 K/uL    Baso # 0.02 0.00 - 0.20 K/uL    nRBC 0 0 /100 WBC    Gran% 85.5 (H) 38.0 - 73.0 %    Lymph% 7.5 (L) 18.0 - 48.0 %    Mono% 5.4 4.0 - 15.0 %    Eosinophil% 0.0 0.0 - 8.0 %    Basophil% 0.1 0.0 - 1.9 %    Differential Method Automated    C3 complement    Collection Time: 01/25/19  5:33 PM   Result Value Ref Range    Complement (C-3) 106 50 - 180 mg/dL   C4 complement    Collection Time: 01/25/19  5:33 PM    Result Value Ref Range    Complement (C-4) 29 11 - 44 mg/dL   Comprehensive Metabolic Panel (CMP)    Collection Time: 01/26/19  4:00 AM   Result Value Ref Range    Sodium 138 136 - 145 mmol/L    Potassium 3.3 (L) 3.5 - 5.1 mmol/L    Chloride 102 95 - 110 mmol/L    CO2 27 23 - 29 mmol/L    Glucose 122 (H) 70 - 110 mg/dL    BUN, Bld 13 6 - 20 mg/dL    Creatinine 0.6 0.5 - 1.4 mg/dL    Calcium 8.6 (L) 8.7 - 10.5 mg/dL    Total Protein 5.8 (L) 6.0 - 8.4 g/dL    Albumin 2.6 (L) 3.5 - 5.2 g/dL    Total Bilirubin 0.4 0.1 - 1.0 mg/dL    Alkaline Phosphatase 79 55 - 135 U/L     (H) 10 - 40 U/L    ALT 58 (H) 10 - 44 U/L    Anion Gap 9 8 - 16 mmol/L    eGFR if African American >60.0 >60 mL/min/1.73 m^2    eGFR if non African American >60.0 >60 mL/min/1.73 m^2   Magnesium    Collection Time: 01/26/19  4:00 AM   Result Value Ref Range    Magnesium 1.9 1.6 - 2.6 mg/dL   Phosphorus    Collection Time: 01/26/19  4:00 AM   Result Value Ref Range    Phosphorus 2.9 2.7 - 4.5 mg/dL   CK    Collection Time: 01/26/19  4:00 AM   Result Value Ref Range    CPK 1947 (H) 20 - 180 U/L   CBC auto differential    Collection Time: 01/26/19  4:00 AM   Result Value Ref Range    WBC 13.94 (H) 3.90 - 12.70 K/uL    RBC 3.65 (L) 4.00 - 5.40 M/uL    Hemoglobin 9.7 (L) 12.0 - 16.0 g/dL    Hematocrit 30.2 (L) 37.0 - 48.5 %    MCV 83 82 - 98 fL    MCH 26.6 (L) 27.0 - 31.0 pg    MCHC 32.1 32.0 - 36.0 g/dL    RDW 16.9 (H) 11.5 - 14.5 %    Platelets 216 150 - 350 K/uL    MPV 9.3 9.2 - 12.9 fL    Immature Granulocytes 1.9 (H) 0.0 - 0.5 %    Gran # (ANC) 11.8 (H) 1.8 - 7.7 K/uL    Immature Grans (Abs) 0.27 (H) 0.00 - 0.04 K/uL    Lymph # 1.0 1.0 - 4.8 K/uL    Mono # 0.9 0.3 - 1.0 K/uL    Eos # 0.0 0.0 - 0.5 K/uL    Baso # 0.01 0.00 - 0.20 K/uL    nRBC 0 0 /100 WBC    Gran% 84.8 (H) 38.0 - 73.0 %    Lymph% 6.9 (L) 18.0 - 48.0 %    Mono% 6.3 4.0 - 15.0 %    Eosinophil% 0.0 0.0 - 8.0 %    Basophil% 0.1 0.0 - 1.9 %    Differential Method Automated           Significant Imaging:  Imaging results within the past 24 hours have been reviewed.

## 2019-01-26 NOTE — PROGRESS NOTES
Pt stated she felt her food was getting stuck and was having a difficult time swallowing. She stated she would cough the food up. Pt took a sip of water and sounded weight and started coughing. MD notified. Pt to be NPO and evaluated by speech in AM

## 2019-01-26 NOTE — PLAN OF CARE
Problem: Adult Inpatient Plan of Care  Goal: Plan of Care Review  Outcome: Ongoing (interventions implemented as appropriate)  Pt remains free from injury; ambulates in hallway with spouse. U/S completed of upper extremities. IVFs discontinued. No pain or nausea reported throughout shift. VSS and NAD. Will continue to monitor

## 2019-01-26 NOTE — PROGRESS NOTES
"[facility]  Rheumatology  Progress Note    Patient Name: Ermelinda Verde  MRN: [id number]  Admission Date: 1/22/2019  Hospital Length of Stay: 4 days  Code Status: Full Code   Attending Provider: Dale Dubon MD  Primary Care Physician: Reta Weeks MD  Principal Problem: Myositis    Subjective:     HPI: 59yo F with history of L sided infiltrating ductal carcinoma of the L breast (dx on Jan 2017), HTN, depression, and gastritis was send from hem/onc clinic for evaluation of possible rhabdomyositis/myositis.    Patient had been complaining of proximal muscle weakness in the shoulder and bilateral UE and LE (UE>LE) x 2 weeks.  Associated symptoms include tenderness and rash (which she attributed to Nab paclitaxel).  Last week (1/15), patient complaining of facial/neck swelling.  CT showed lymphadenopathy without airway or vascular compromise.  Patient was started on high dose prednisone (60mg x 2, 40x2, 10mgx1) on 1/16 with no improvement of symptoms.  Lab work performed showed elevation of CPK (~4K) with AST elevation.  Patient was subsequently admitted with concern for medication (chemotherapy) induced myositis.  Associated symptoms include rash (that developed after initiation of 1st round of chemotherapy).  Rash was at the nape of the neck, bilateral elbows, knuckles, thighs, and ankle region.  Rash was red, scaly, and pruritic, and had resolved significantly after last chemotherapy treatment (1/3).  Currently complaining of bilateral UE (shoulder to elbow) weakness - feels "heavy".  Started in the L breast area which spread to the L shoulder and then R shoulder.      L breast cancer s/p completion of 4 cycles of demi-adjuvant taxotere and cytoxan (completed on 5/9/17) and mastectomy (6/26/17) and then 4 cycles of adjuvant Adriamycin (completed 9/12/17). In 10/2017 - patient developed L supraclavicular lymphadenopathy which FNA confirmed as reoccurrence of previously treated breast cancer.     Had " "Abraxane infusion (1/3) - on the 3rd round of chemotherapy of abraxane and immunomodulator.  Scheduled to complete 3 rounds and then PET scan for evaluation/determination if more treatment require.      Denies any family history of autoimmune diseases.    No smoking, EtOH, recreational drug usage.    Denies any photosensitivity, joint swelling, unintentional weigh loss, abdominal pain, night sweats, CP, SOB.  +oral thrush.     Interval History: Patient was seen sitting up chair.   She states that she was having difficulty swallowing with regurg yesterday afternoon.  Fluids was discontinued about of diffused swelling.  Swelling decreased after stopping of fluids and she felt improvement in strength this morning.  Fluids restarted because of dysphagia and patient continues to fell "heaviness" and decreased strength.     Current Facility-Administered Medications   Medication Frequency    0.9%  NaCl infusion Continuous    acetaminophen tablet 650 mg Q4H PRN    ALPRAZolam tablet 0.25 mg TID PRN    amLODIPine tablet 10 mg Daily    calcium carbonate (OS-ESTELA) tablet 500 mg Daily    cetirizine tablet 10 mg Daily    cyanocobalamin tablet 500 mcg Daily    dextrose 50% injection 12.5 g PRN    dextrose 50% injection 25 g PRN    enoxaparin injection 40 mg Daily    escitalopram oxalate tablet 10 mg Daily    famotidine tablet 20 mg BID    fluconazole tablet 200 mg Daily    fluticasone 50 mcg/actuation nasal spray 50 mcg Daily    glucagon (human recombinant) injection 1 mg PRN    glucose chewable tablet 16 g PRN    glucose chewable tablet 24 g PRN    insulin aspart U-100 pen 0-5 Units QID (AC + HS) PRN    methylPREDNISolone sodium succinate injection 125 mg BID    nystatin 100,000 unit/mL suspension 500,000 Units QID    ondansetron disintegrating tablet 8 mg Q8H PRN    oxyCODONE immediate release tablet 5 mg Q6H PRN    polyethylene glycol packet 17 g BID PRN    promethazine tablet 12.5 mg Q6H PRN    sodium " chloride 0.9% flush 5 mL PRN    vitamin D 1000 units tablet 1,000 Units Daily     Objective:     Vital Signs (Most Recent):  Temp: 98 °F (36.7 °C) (01/26/19 1223)  Pulse: 85 (01/26/19 1223)  Resp: 18 (01/26/19 1223)  BP: (!) 163/81 (01/26/19 1223)  SpO2: 100 % (01/26/19 1223)  O2 Device (Oxygen Therapy): room air (01/26/19 0353) Vital Signs (24h Range):  Temp:  [97.5 °F (36.4 °C)-98.5 °F (36.9 °C)] 98 °F (36.7 °C)  Pulse:  [] 85  Resp:  [17-20] 18  SpO2:  [96 %-100 %] 100 %  BP: (140-169)/(76-82) 163/81     Weight: 84.2 kg (185 lb 11.8 oz) (01/26/19 0400)  Body mass index is 30.91 kg/m².  Body surface area is 1.97 meters squared.      Intake/Output Summary (Last 24 hours) at 1/26/2019 1317  Last data filed at 1/26/2019 0945  Gross per 24 hour   Intake 900 ml   Output 3350 ml   Net -2450 ml       Physical Exam   Constitutional: She is oriented to person, place, and time and well-developed, well-nourished, and in no distress. No distress.   HENT:   Head: Normocephalic and atraumatic.   Right Ear: External ear normal.   Left Ear: External ear normal.   Bilateral orbital edema with heliotropic rash    Eyes: Conjunctivae and EOM are normal. Pupils are equal, round, and reactive to light.   Neck: Normal range of motion. Neck supple.   Cardiovascular: Normal rate, regular rhythm, normal heart sounds and intact distal pulses.    Pulmonary/Chest: Effort normal and breath sounds normal. No respiratory distress.   Abdominal: Soft. Bowel sounds are normal.   Neurological: She is alert and oriented to person, place, and time. GCS score is 15.   Skin: Skin is warm and dry. No rash noted.     Hyperpigmented non raised rash over nape of neck, elbows, knuckles (resemble Gottron's papules), thighs, and ankles.     Thigh rash is red with scaly skin.   Psychiatric: Mood, memory, affect and judgment normal.   Musculoskeletal: Normal range of motion. She exhibits edema. She exhibits no tenderness or deformity.   No signs of  synovitis. ROM intact   Inability to move bilateral UE above 45 degrees simultaneously.  Able to move a little past 120 on each one alone  3/5 strength of bilateral UE muscles (L side weaker than R)  4/5 of bilateral LE muscles  Bilateral UE edema with skin thickening         Significant Labs:  Recent Results (from the past 48 hour(s))   Comprehensive Metabolic Panel (CMP)    Collection Time: 01/25/19  4:20 AM   Result Value Ref Range    Sodium 138 136 - 145 mmol/L    Potassium 3.5 3.5 - 5.1 mmol/L    Chloride 104 95 - 110 mmol/L    CO2 26 23 - 29 mmol/L    Glucose 136 (H) 70 - 110 mg/dL    BUN, Bld 10 6 - 20 mg/dL    Creatinine 0.6 0.5 - 1.4 mg/dL    Calcium 8.7 8.7 - 10.5 mg/dL    Total Protein 6.3 6.0 - 8.4 g/dL    Albumin 2.7 (L) 3.5 - 5.2 g/dL    Total Bilirubin 0.4 0.1 - 1.0 mg/dL    Alkaline Phosphatase 83 55 - 135 U/L     (H) 10 - 40 U/L    ALT 59 (H) 10 - 44 U/L    Anion Gap 8 8 - 16 mmol/L    eGFR if African American >60.0 >60 mL/min/1.73 m^2    eGFR if non African American >60.0 >60 mL/min/1.73 m^2   Magnesium    Collection Time: 01/25/19  4:20 AM   Result Value Ref Range    Magnesium 1.8 1.6 - 2.6 mg/dL   Phosphorus    Collection Time: 01/25/19  4:20 AM   Result Value Ref Range    Phosphorus 2.8 2.7 - 4.5 mg/dL   CK    Collection Time: 01/25/19  4:20 AM   Result Value Ref Range    CPK 2224 (H) 20 - 180 U/L   CBC auto differential    Collection Time: 01/25/19  4:20 AM   Result Value Ref Range    WBC 18.76 (H) 3.90 - 12.70 K/uL    RBC 3.91 (L) 4.00 - 5.40 M/uL    Hemoglobin 10.3 (L) 12.0 - 16.0 g/dL    Hematocrit 32.6 (L) 37.0 - 48.5 %    MCV 83 82 - 98 fL    MCH 26.3 (L) 27.0 - 31.0 pg    MCHC 31.6 (L) 32.0 - 36.0 g/dL    RDW 16.9 (H) 11.5 - 14.5 %    Platelets 253 150 - 350 K/uL    MPV 9.3 9.2 - 12.9 fL    Immature Granulocytes 1.5 (H) 0.0 - 0.5 %    Gran # (ANC) 16.0 (H) 1.8 - 7.7 K/uL    Immature Grans (Abs) 0.29 (H) 0.00 - 0.04 K/uL    Lymph # 1.4 1.0 - 4.8 K/uL    Mono # 1.0 0.3 - 1.0 K/uL     Eos # 0.0 0.0 - 0.5 K/uL    Baso # 0.02 0.00 - 0.20 K/uL    nRBC 0 0 /100 WBC    Gran% 85.5 (H) 38.0 - 73.0 %    Lymph% 7.5 (L) 18.0 - 48.0 %    Mono% 5.4 4.0 - 15.0 %    Eosinophil% 0.0 0.0 - 8.0 %    Basophil% 0.1 0.0 - 1.9 %    Differential Method Automated    C3 complement    Collection Time: 01/25/19  5:33 PM   Result Value Ref Range    Complement (C-3) 106 50 - 180 mg/dL   C4 complement    Collection Time: 01/25/19  5:33 PM   Result Value Ref Range    Complement (C-4) 29 11 - 44 mg/dL   Comprehensive Metabolic Panel (CMP)    Collection Time: 01/26/19  4:00 AM   Result Value Ref Range    Sodium 138 136 - 145 mmol/L    Potassium 3.3 (L) 3.5 - 5.1 mmol/L    Chloride 102 95 - 110 mmol/L    CO2 27 23 - 29 mmol/L    Glucose 122 (H) 70 - 110 mg/dL    BUN, Bld 13 6 - 20 mg/dL    Creatinine 0.6 0.5 - 1.4 mg/dL    Calcium 8.6 (L) 8.7 - 10.5 mg/dL    Total Protein 5.8 (L) 6.0 - 8.4 g/dL    Albumin 2.6 (L) 3.5 - 5.2 g/dL    Total Bilirubin 0.4 0.1 - 1.0 mg/dL    Alkaline Phosphatase 79 55 - 135 U/L     (H) 10 - 40 U/L    ALT 58 (H) 10 - 44 U/L    Anion Gap 9 8 - 16 mmol/L    eGFR if African American >60.0 >60 mL/min/1.73 m^2    eGFR if non African American >60.0 >60 mL/min/1.73 m^2   Magnesium    Collection Time: 01/26/19  4:00 AM   Result Value Ref Range    Magnesium 1.9 1.6 - 2.6 mg/dL   Phosphorus    Collection Time: 01/26/19  4:00 AM   Result Value Ref Range    Phosphorus 2.9 2.7 - 4.5 mg/dL   CK    Collection Time: 01/26/19  4:00 AM   Result Value Ref Range    CPK 1947 (H) 20 - 180 U/L   CBC auto differential    Collection Time: 01/26/19  4:00 AM   Result Value Ref Range    WBC 13.94 (H) 3.90 - 12.70 K/uL    RBC 3.65 (L) 4.00 - 5.40 M/uL    Hemoglobin 9.7 (L) 12.0 - 16.0 g/dL    Hematocrit 30.2 (L) 37.0 - 48.5 %    MCV 83 82 - 98 fL    MCH 26.6 (L) 27.0 - 31.0 pg    MCHC 32.1 32.0 - 36.0 g/dL    RDW 16.9 (H) 11.5 - 14.5 %    Platelets 216 150 - 350 K/uL    MPV 9.3 9.2 - 12.9 fL    Immature Granulocytes 1.9 (H)  "0.0 - 0.5 %    Gran # (ANC) 11.8 (H) 1.8 - 7.7 K/uL    Immature Grans (Abs) 0.27 (H) 0.00 - 0.04 K/uL    Lymph # 1.0 1.0 - 4.8 K/uL    Mono # 0.9 0.3 - 1.0 K/uL    Eos # 0.0 0.0 - 0.5 K/uL    Baso # 0.01 0.00 - 0.20 K/uL    nRBC 0 0 /100 WBC    Gran% 84.8 (H) 38.0 - 73.0 %    Lymph% 6.9 (L) 18.0 - 48.0 %    Mono% 6.3 4.0 - 15.0 %    Eosinophil% 0.0 0.0 - 8.0 %    Basophil% 0.1 0.0 - 1.9 %    Differential Method Automated          Significant Imaging:  Imaging results within the past 24 hours have been reviewed.    Assessment/Plan:     * Myositis    57yo F with history of L sided infiltrating ductal carcinoma of the L breast (dx on Jan 2017), HTN, depression, and gastritis was send from hem/onc clinic for evaluation of possible rhabdomyositis/myositis.     Labs: ESR 50, CRP 31.1, CPK 4562 (trending downward 2224), Aldolase 13.6.  , ALT 64.  UA normal.  CBC unremarkable.  +DARCY 1:640 speckled with negative profile.  Complements normal.    Patient was started on solumedrol 1g x 1 for possible treatment of myositis.  Slight improvement in symptoms.  Failed PO prednisone - worsening of symptoms    Case reports of docetaxel related inflammatory myositis had been documented. "...taxanes have been noted to cause disabling but transient arthralgia and myalgias; it is important to consider the possibility of inflammatory myopathy as a possible complication in patients undergoing treatment with these agents." - A case of docetaxel induced myositis and review of literature. Austin et al 2015. Case reports in Rheumatology   Case reports of atezolizumb (immunomodulator) cause of myositis  - Lucy et al 2017 "Myositis as an adverse even of immune checkpoint blockade for cancer therapy.  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   Dermatomyositis is usually associated with malignancy.  MRI of LUE showed edema of the deltoid and biceps.  "     Bilateral UE is swollen and puffy.  R/O DVT (bilateral UE US - negative).  Fluids restarted because of dysphagia (thrush vs myositis?). Patient is on high dose IV steroids and CPK continues to trend downward, so if it's myositis, improvement should be noted (just like bilateral UE).      Plan  - Hold paclitaxel agent and atezolizumb at this time - both can cause myositis as AE  - continue to monitor CPK/Aldolase daily  - Continue methylprednisone 125mg BID  - oral thrush - management as per primary  - pending barium swallow eval  - pending myomarker panel   - pending skin biopsy result - called pathology, will try to expedite read  - If dermatology unable to differentiate rash and + myositis on MRI - consideration for muscle biopsy by general surgery (patient will probably need biopsy of the RUE because of L mastectomy with lymph node dissection).  - continue daily PT  - IVF now - can be discontinued when tolerating PO                Clarissa Swift MD  Rheumatology  [facility]

## 2019-01-26 NOTE — ASSESSMENT & PLAN NOTE
-on home amlodipine-benazepril; will hold ACEi for now and observe renal function with CPK elevation in rhabdo.     Plan:   amlodipine 10 mg qday

## 2019-01-26 NOTE — SUBJECTIVE & OBJECTIVE
Interval History: patient endorsed a worsening ability to swallow. Made NPO. Endorses improving weakness.     Oncology Treatment Plan:   OP BREAST DOCETAXEL Q3W    Medications:  Continuous Infusions:   sodium chloride 0.9% 125 mL/hr (01/26/19 0949)     Scheduled Meds:   amLODIPine  10 mg Oral Daily    calcium carbonate  500 mg Oral Daily    cetirizine  10 mg Oral Daily    cyanocobalamin  500 mcg Oral Daily    enoxaparin  40 mg Subcutaneous Daily    escitalopram oxalate  10 mg Oral Daily    famotidine  20 mg Oral BID    fluconazole  200 mg Oral Daily    fluticasone  1 spray Each Nare Daily    methylPREDNISolone sodium succinate  125 mg Intravenous BID    nystatin  500,000 Units Oral QID    vitamin D  1,000 Units Oral Daily     PRN Meds:acetaminophen, ALPRAZolam, dextrose 50%, dextrose 50%, glucagon (human recombinant), glucose, glucose, insulin aspart U-100, ondansetron, oxyCODONE, polyethylene glycol, promethazine, sodium chloride 0.9%     Review of Systems   Constitutional: Positive for fatigue. Negative for activity change, appetite change, chills, diaphoresis, fever and unexpected weight change.   HENT: Positive for facial swelling. Negative for congestion, ear discharge, hearing loss, postnasal drip, sinus pressure, sneezing, sore throat and tinnitus.    Eyes: Negative for photophobia, pain and redness.   Respiratory: Negative for apnea, cough, choking, chest tightness, shortness of breath and stridor.    Cardiovascular: Negative for chest pain, palpitations and leg swelling.   Gastrointestinal: Negative for abdominal pain, anal bleeding, constipation, diarrhea, nausea and rectal pain.   Endocrine: Negative for polyuria.   Genitourinary: Negative for dysuria and hematuria.   Musculoskeletal: Positive for myalgias. Negative for arthralgias, back pain, gait problem, joint swelling, neck pain and neck stiffness.   Skin: Positive for rash. Negative for color change and pallor.   Allergic/Immunologic:  Negative for immunocompromised state.   Neurological: Positive for weakness. Negative for dizziness, seizures and numbness.   Hematological: Positive for adenopathy. Does not bruise/bleed easily.   Psychiatric/Behavioral: Negative for agitation and behavioral problems.     Objective:     Vital Signs (Most Recent):  Temp: 98 °F (36.7 °C) (01/26/19 1223)  Pulse: 85 (01/26/19 1223)  Resp: 18 (01/26/19 1223)  BP: (!) 163/81 (01/26/19 1223)  SpO2: 100 % (01/26/19 1223) Vital Signs (24h Range):  Temp:  [97.5 °F (36.4 °C)-98.5 °F (36.9 °C)] 98 °F (36.7 °C)  Pulse:  [] 85  Resp:  [17-20] 18  SpO2:  [96 %-100 %] 100 %  BP: (140-169)/(76-82) 163/81     Weight: 84.2 kg (185 lb 11.8 oz)  Body mass index is 30.91 kg/m².  Body surface area is 1.97 meters squared.      Intake/Output Summary (Last 24 hours) at 1/26/2019 1420  Last data filed at 1/26/2019 0945  Gross per 24 hour   Intake 420 ml   Output 3350 ml   Net -2930 ml       Physical Exam   Constitutional: She is oriented to person, place, and time. She appears well-developed and well-nourished. No distress.   HENT:   Head: Atraumatic.   Nose: Nose normal.   Mouth/Throat: Oropharyngeal exudate (white tongue exudate) present.   Face appears swollen   Eyes: Conjunctivae and EOM are normal. Pupils are equal, round, and reactive to light. Right eye exhibits no discharge. Left eye exhibits no discharge. No scleral icterus.   Neck: Normal range of motion. Neck supple. No tracheal deviation present.   Cardiovascular: Normal rate, regular rhythm and intact distal pulses. Exam reveals no gallop and no friction rub.   Murmur (systolic murmur appreciated over left costal margin) heard.  Pulmonary/Chest: Effort normal and breath sounds normal. No respiratory distress. She has no wheezes. She has no rales. She exhibits no tenderness.   Abdominal: Soft. Bowel sounds are normal. She exhibits no distension and no mass. There is no tenderness. There is no rebound and no guarding.    Musculoskeletal: Normal range of motion. She exhibits edema (facial edema, upper extremity edema b/l). She exhibits no tenderness or deformity.   Neurological: She is alert and oriented to person, place, and time. No cranial nerve deficit or sensory deficit.     3/5 shoulder strength on abduction; range of motion limited by weakness  5/5 extension of forearms strength against resistance   Skin: Skin is warm and dry. Capillary refill takes less than 2 seconds. No rash noted. She is not diaphoretic. No erythema. No pallor.   Psychiatric: She has a normal mood and affect.       Significant Labs:   CBC:   Recent Labs   Lab 01/25/19 0420 01/26/19  0400   WBC 18.76* 13.94*   HGB 10.3* 9.7*   HCT 32.6* 30.2*    216    and CMP:   Recent Labs   Lab 01/25/19 0420 01/26/19  0400    138   K 3.5 3.3*    102   CO2 26 27   * 122*   BUN 10 13   CREATININE 0.6 0.6   CALCIUM 8.7 8.6*   PROT 6.3 5.8*   ALBUMIN 2.7* 2.6*   BILITOT 0.4 0.4   ALKPHOS 83 79   * 163*   ALT 59* 58*   ANIONGAP 8 9   EGFRNONAA >60.0 >60.0       Diagnostic Results:  I have reviewed and interpreted all pertinent imaging results/findings within the past 24 hours.

## 2019-01-26 NOTE — PT/OT/SLP EVAL
Speech Language Pathology Evaluation  Bedside Swallow    Patient Name:  Ermelinda Verde   MRN:  [id number]  Admitting Diagnosis: Myositis    Recommendations:                 General Recommendations:  Dysphagia therapy and Modified barium swallow study  Diet recommendations:  NPO(necessary po medications may be crushed and buried in PUDDING.), NPO   Aspiration Precautions: necessary PO medications crushed and buried in PUDDING. , Frequent oral care and Strict aspiration precautions   General Precautions: Standard, aspiration, NPO, fall  Communication strategies:  none    History:     Past Medical History:   Diagnosis Date    Breast cancer 2017    left    Depression     Diverticulosis     Gastritis     Hypertension     Vitamin B12 deficiency 3/8/2018       Past Surgical History:   Procedure Laterality Date    BIOPSY-SENTINEL NODE Left 2017    Performed [facility] Alfredo [facility] at [facility] OR    BREAST BIOPSY Left     BREAST RECONSTRUCTION Left 2017     SECTION      COLONOSCOPY  2011    repeat in 10 yrs.    D&C      OAIGDPYHU-QWON-M-CATH Right 10/31/2018    Performed by Deo Palencia MD at [facility] OR 2ND FLR    WMQLNMLCN-YQGT-K-CATH Right 2017    Per[facility] at [facility] OR    FYYWOMMVB-CZLF-W-CATH-neck or chest Fluoro needed Consent AM of surgery Right 10/30/2018    Performed by Deo Palencia MD at [facility] OR 2ND FLR    MASTECTOMY Left 2017    MASTECTOMY Left 2017    Performed by Regina Rose MD at [facility] OR    MYOMECTOMY      PORTACATH PLACEMENT      RECONSTRUCTION-BREAST/FLAP-SCOTT Left 2017    Performed by Sukhjinder Sewell MD at [facility] OR    SENTINEL LYMPH NODE BIOPSY  2017    left    TOTAL REDUCTION MAMMOPLASTY Right 2017       Social History: Patient lives with spouse.    Prior Intubation HX:  N/a for this admission    Modified Barium Swallow: n/a for this admission    Chest X-Rays: 2019  FINDINGS:  Right chest port catheter has  "its tip directed toward the innominate vein.  Significant dextro levoscoliosis.  Surgical staple overlies the left upper quadrant.  Heart size and pulmonary vessels are normal.  No confluent consolidation pleural fluid or pneumothorax.    Prior diet: regular/thin    Subjective     "Something is stopping me from swallowing" pt stated throughout po trials.   Per son report, around yesterday noon, pt would "eat decent amount and get caught. She was able to bring it up and suction it." Per son, pt stated it felt like a "tickle" in throat.   Patient goals: to eat/drink    Pain/Comfort:  · Pain Rating 1: 0/10  · Pain Rating Post-Intervention 1: 0/10    Objective:     Oral Musculature Evaluation  · Oral Musculature: WFL  · Dentition: present and adequate  · Secretion Management: adequate  · Oral Labial Strength and Mobility: WFL  · Lingual Strength and Mobility: WFL  · Volitional Cough: strong and productive  · Volitional Swallow: strong  · Voice Prior to PO Intake: strong    Bedside Swallow Eval:   Consistencies Assessed:  · Thin liquids ice chip X1, half teaspoon X1  · Nectar thick liquids cup sip X1  · Honey thick liquids cup sip X5  · Pudding thick liquids: teaspoon X2  · Puree teaspoon X2  · Solids 1/4 cracker     Oral Phase:   · WFL    Pharyngeal Phase:   · *Of note, pt with coughing prior to po intake requiring suction of secretions that pt expectoricated. Suspect mucous.   · Thin liquids: Pt demonstrated slight throat clear with ice chip X1. Pt demonstrated immediate throat clear followed by suctioning with half teaspoon thin liquids.   · Nectar thick liquid: Pt demonstrated immediate wet vocal quality with cup sip of nectar thick liquids, no throat clearing or coughing  · Honey thick liquids: Pt demonstrated significantly delayed cough X1 on 1/4 cup sips of honey thick liquids, however, on last sip of honey thick trials, pt demonstrated immediate and prolonged coughing. In addition, wet quality noted.   · Pudding " thick liquids: Pt tolerated pudding thick liquids initially without difficulty, however significantly delayed throat clear with coughing and expectorication noted. Suspect likely residue from previous trials, however, aspiration cannot be ruled out at bedside.   · Puree (apple sauce): Pt tolerated half teaspoon trials without difficulty, however, when presented a full teaspoon, pt demonstrated immediate throat clear and coughing up of material warranting suctioning.   · 1/4 cracker: Pt tolerated 1/4 cracker without difficulty. However, other po trials were administered after cracker trails in which pt exhibited s/s of aspiration with eventual expectorication and suction warranted, thus, aspiration cannot be ruled out at bedside.     Compensatory Strategies  · Pt utilized double swallows and effortful swallows on all po trials at this date.     Treatment: Aspiration cannot be ruled out at bedside, thus, MBSS is recommended to objectively analyze swallow function. SLP communciated recommendations with nurse and MD who were in agreement with recommendations for MBSS and NPO at this time with necessary medications crushed in PUDDING only (no apple sauce). SLP communicated findings of evaluation with pt and family at length re: safe swallowing, importance of NPO at this time, aspiration precautions, s/s of aspiration, possible MBSS, and POC. All verbalized understanding and demonstrated learning.     Assessment:     Ermelinda Verde is a 58 y.o. female with an SLP diagnosis of risk of aspiration and Dysphagia.      Goals:   Multidisciplinary Problems     SLP Goals        Problem: SLP Goal    Goal Priority Disciplines Outcome   SLP Goal     SLP Ongoing (interventions implemented as appropriate)   Description:  Speech-Language Pathology Goals  Goals expected to be met by 2/2:   1) Pt will participate in ongoing swallow assessment to determine appropriateness of po diet.   2) Pt will participate in MBSS to further assess  swallow function.                     Plan:     · Patient to be seen:  5 x/week   · Plan of Care expires:  02/24/19  · Plan of Care reviewed with:  patient, spouse, son   · SLP Follow-Up:  Yes       Discharge recommendations:  (pending progress. )       Time Tracking:     SLP Treatment Date:   01/26/19  Speech Start Time:  0831  Speech Stop Time:  0909     Speech Total Time (min):  38 min    Billable Minutes: Eval Swallow and Oral Function 15 and Seld Care/Home Management Training 23     Rhonda Krishnamurthy M.S., CCC-SLP  Speech Language Pathologist  [phone] - pager   1/26/2019      Rhonda Krishnamurthy, MS CCC-SLP  01/26/2019

## 2019-01-26 NOTE — ASSESSMENT & PLAN NOTE
-On January 24, 2017 a core needle biopsy was performed which showed infiltrating ductal carcinoma, high grade.  The tumor was ER negative, IA negative, and HER-2 negative.  -She had 4 cycles of  Wilbur-adjuvantTaxotere and Cytoxan completed  on 5/9/17.  -On June 26 she underwent left mastectomy.  That revealed 2 foci of invasive high-grade carcinoma measuring 14 mm and 1.5 mm.  Margins were negative.  -She completed 4 cycles of adjuvant Adriamycin on September 12, 2017.  -  A fine-needle aspirate of the lymph node was performed on October 19th.  That showed metastatic carcinoma consistent with breast primary which was ER negative, IA negative and HER 2-negative.    -Per rheumatology: hold paclitaxel agent and atezolizumb at this time - both can cause myositis

## 2019-01-27 PROBLEM — E87.6 HYPOKALEMIA: Status: ACTIVE | Noted: 2019-01-27

## 2019-01-27 LAB
ALBUMIN SERPL BCP-MCNC: 2.7 G/DL
ALP SERPL-CCNC: 80 U/L
ALT SERPL W/O P-5'-P-CCNC: 63 U/L
ANION GAP SERPL CALC-SCNC: 9 MMOL/L
AST SERPL-CCNC: 166 U/L
BASOPHILS # BLD AUTO: 0.01 K/UL
BASOPHILS NFR BLD: 0.1 %
BILIRUB SERPL-MCNC: 0.4 MG/DL
BUN SERPL-MCNC: 12 MG/DL
CALCIUM SERPL-MCNC: 8.5 MG/DL
CHLORIDE SERPL-SCNC: 101 MMOL/L
CK SERPL-CCNC: 2076 U/L
CO2 SERPL-SCNC: 28 MMOL/L
CREAT SERPL-MCNC: 0.6 MG/DL
DIFFERENTIAL METHOD: ABNORMAL
EOSINOPHIL # BLD AUTO: 0 K/UL
EOSINOPHIL NFR BLD: 0 %
ERYTHROCYTE [DISTWIDTH] IN BLOOD BY AUTOMATED COUNT: 17.1 %
EST. GFR  (AFRICAN AMERICAN): >60 ML/MIN/1.73 M^2
EST. GFR  (NON AFRICAN AMERICAN): >60 ML/MIN/1.73 M^2
GLUCOSE SERPL-MCNC: 120 MG/DL
HCT VFR BLD AUTO: 30.7 %
HGB BLD-MCNC: 9.7 G/DL
IMM GRANULOCYTES # BLD AUTO: 0.15 K/UL
IMM GRANULOCYTES NFR BLD AUTO: 1.3 %
LYMPHOCYTES # BLD AUTO: 1 K/UL
LYMPHOCYTES NFR BLD: 8.5 %
MAGNESIUM SERPL-MCNC: 2 MG/DL
MCH RBC QN AUTO: 26 PG
MCHC RBC AUTO-ENTMCNC: 31.6 G/DL
MCV RBC AUTO: 82 FL
MONOCYTES # BLD AUTO: 0.7 K/UL
MONOCYTES NFR BLD: 6.3 %
NEUTROPHILS # BLD AUTO: 9.4 K/UL
NEUTROPHILS NFR BLD: 83.8 %
NRBC BLD-RTO: 0 /100 WBC
PHOSPHATE SERPL-MCNC: 2.9 MG/DL
PLATELET # BLD AUTO: 201 K/UL
PMV BLD AUTO: 9.8 FL
POTASSIUM SERPL-SCNC: 3.2 MMOL/L
PROT SERPL-MCNC: 5.9 G/DL
RBC # BLD AUTO: 3.73 M/UL
SODIUM SERPL-SCNC: 138 MMOL/L
WBC # BLD AUTO: 11.21 K/UL

## 2019-01-27 PROCEDURE — 84100 ASSAY OF PHOSPHORUS: CPT

## 2019-01-27 PROCEDURE — 82550 ASSAY OF CK (CPK): CPT

## 2019-01-27 PROCEDURE — 83735 ASSAY OF MAGNESIUM: CPT

## 2019-01-27 PROCEDURE — 63600175 PHARM REV CODE 636 W HCPCS: Performed by: STUDENT IN AN ORGANIZED HEALTH CARE EDUCATION/TRAINING PROGRAM

## 2019-01-27 PROCEDURE — 80053 COMPREHEN METABOLIC PANEL: CPT

## 2019-01-27 PROCEDURE — 97165 OT EVAL LOW COMPLEX 30 MIN: CPT

## 2019-01-27 PROCEDURE — 25000003 PHARM REV CODE 250: Performed by: STUDENT IN AN ORGANIZED HEALTH CARE EDUCATION/TRAINING PROGRAM

## 2019-01-27 PROCEDURE — 85025 COMPLETE CBC W/AUTO DIFF WBC: CPT

## 2019-01-27 PROCEDURE — 20600001 HC STEP DOWN PRIVATE ROOM

## 2019-01-27 PROCEDURE — 99232 SBSQ HOSP IP/OBS MODERATE 35: CPT | Mod: ,,, | Performed by: INTERNAL MEDICINE

## 2019-01-27 PROCEDURE — 99232 PR SUBSEQUENT HOSPITAL CARE,LEVL II: ICD-10-PCS | Mod: ,,, | Performed by: INTERNAL MEDICINE

## 2019-01-27 PROCEDURE — 63600175 PHARM REV CODE 636 W HCPCS: Performed by: INTERNAL MEDICINE

## 2019-01-27 PROCEDURE — 25000003 PHARM REV CODE 250: Performed by: INTERNAL MEDICINE

## 2019-01-27 PROCEDURE — 82085 ASSAY OF ALDOLASE: CPT

## 2019-01-27 RX ORDER — POTASSIUM CHLORIDE 7.45 MG/ML
10 INJECTION INTRAVENOUS
Status: COMPLETED | OUTPATIENT
Start: 2019-01-27 | End: 2019-01-27

## 2019-01-27 RX ADMIN — FLUCONAZOLE 200 MG: 200 TABLET ORAL at 09:01

## 2019-01-27 RX ADMIN — CYANOCOBALAMIN TAB 250 MCG 500 MCG: 250 TAB at 09:01

## 2019-01-27 RX ADMIN — AMLODIPINE BESYLATE 10 MG: 10 TABLET ORAL at 09:01

## 2019-01-27 RX ADMIN — METHYLPREDNISOLONE SODIUM SUCCINATE 125 MG: 125 INJECTION, POWDER, FOR SOLUTION INTRAMUSCULAR; INTRAVENOUS at 09:01

## 2019-01-27 RX ADMIN — NYSTATIN 500000 UNITS: 500000 SUSPENSION ORAL at 08:01

## 2019-01-27 RX ADMIN — ENOXAPARIN SODIUM 40 MG: 100 INJECTION SUBCUTANEOUS at 05:01

## 2019-01-27 RX ADMIN — NYSTATIN 500000 UNITS: 500000 SUSPENSION ORAL at 01:01

## 2019-01-27 RX ADMIN — NYSTATIN 500000 UNITS: 500000 SUSPENSION ORAL at 09:01

## 2019-01-27 RX ADMIN — FAMOTIDINE 20 MG: 20 TABLET ORAL at 08:01

## 2019-01-27 RX ADMIN — VITAMIN D, TAB 1000IU (100/BT) 1000 UNITS: 25 TAB at 09:01

## 2019-01-27 RX ADMIN — POTASSIUM CHLORIDE 10 MEQ: 10 INJECTION, SOLUTION INTRAVENOUS at 12:01

## 2019-01-27 RX ADMIN — POTASSIUM CHLORIDE 10 MEQ: 10 INJECTION, SOLUTION INTRAVENOUS at 10:01

## 2019-01-27 RX ADMIN — SODIUM CHLORIDE: 0.9 INJECTION, SOLUTION INTRAVENOUS at 03:01

## 2019-01-27 RX ADMIN — FAMOTIDINE 20 MG: 20 TABLET ORAL at 09:01

## 2019-01-27 RX ADMIN — CALCIUM 500 MG: 500 TABLET ORAL at 09:01

## 2019-01-27 RX ADMIN — METHYLPREDNISOLONE SODIUM SUCCINATE 125 MG: 125 INJECTION, POWDER, FOR SOLUTION INTRAMUSCULAR; INTRAVENOUS at 08:01

## 2019-01-27 RX ADMIN — CETIRIZINE HYDROCHLORIDE 10 MG: 10 TABLET, FILM COATED ORAL at 09:01

## 2019-01-27 NOTE — ASSESSMENT & PLAN NOTE
-On January 24, 2017 a core needle biopsy was performed which showed infiltrating ductal carcinoma, high grade.  The tumor was ER negative, GA negative, and HER-2 negative.  -She had 4 cycles of  Wilbur-adjuvantTaxotere and Cytoxan completed  on 5/9/17.  -On June 26 she underwent left mastectomy.  That revealed 2 foci of invasive high-grade carcinoma measuring 14 mm and 1.5 mm.  Margins were negative.  -She completed 4 cycles of adjuvant Adriamycin on September 12, 2017.  -  A fine-needle aspirate of the lymph node was performed on October 19th.  That showed metastatic carcinoma consistent with breast primary which was ER negative, GA negative and HER 2-negative.    -Per rheumatology: hold paclitaxel agent and atezolizumb at this time - both can cause myositis

## 2019-01-27 NOTE — SUBJECTIVE & OBJECTIVE
"Interval History: Patient still with difficulty swallowing due to thrush. Started on fluconazole on 1/26/19. Went for swallowing study today. Still feels that her upper arms are "heavy". Feels an improvement of 30-40% in strength since admission. No new rashes. No trouble breathing, f/c, n/v/d.      Current Facility-Administered Medications   Medication Frequency    acetaminophen tablet 650 mg Q4H PRN    ALPRAZolam tablet 0.25 mg TID PRN    amLODIPine tablet 10 mg Daily    benazepril tablet 40 mg Daily    calcium carbonate (OS-ESTELA) tablet 500 mg Daily    cetirizine tablet 10 mg Daily    cyanocobalamin tablet 500 mcg Daily    dextrose 50% injection 12.5 g PRN    dextrose 50% injection 25 g PRN    enoxaparin injection 40 mg Daily    escitalopram oxalate tablet 10 mg Daily    famotidine tablet 20 mg BID    fluconazole tablet 200 mg Daily    fluticasone 50 mcg/actuation nasal spray 50 mcg Daily    glucagon (human recombinant) injection 1 mg PRN    glucose chewable tablet 16 g PRN    glucose chewable tablet 24 g PRN    insulin aspart U-100 pen 0-5 Units QID (AC + HS) PRN    methylPREDNISolone sodium succinate injection 125 mg BID    nystatin 100,000 unit/mL suspension 500,000 Units QID    ondansetron disintegrating tablet 8 mg Q8H PRN    oxyCODONE immediate release tablet 5 mg Q6H PRN    polyethylene glycol packet 17 g BID PRN    potassium chloride 10 mEq in 100 mL IVPB Q1H    promethazine tablet 12.5 mg Q6H PRN    sodium chloride 0.9% flush 5 mL PRN    vitamin D 1000 units tablet 1,000 Units Daily     Objective:     Vital Signs (Most Recent):  Temp: 98.5 °F (36.9 °C) (01/28/19 0800)  Pulse: 91 (01/28/19 0800)  Resp: 14 (01/28/19 0800)  BP: (!) 141/78 (01/28/19 0800)  SpO2: 99 % (01/28/19 0800)  O2 Device (Oxygen Therapy): room air (01/28/19 0312) Vital Signs (24h Range):  Temp:  [98.1 °F (36.7 °C)-98.7 °F (37.1 °C)] 98.1 °F (36.7 °C)  Pulse:  [] 89  Resp:  [14-18] 17  SpO2:  [94 %-100 %] " 94 %  BP: (135-167)/(76-90) 140/82     Weight: 79.9 kg (176 lb 0.6 oz) (01/28/19 0400)  Body mass index is 30.34 kg/m².  Body surface area is 1.95 meters squared.      Intake/Output Summary (Last 24 hours) at 1/28/2019 1037  Last data filed at 1/28/2019 0800  Gross per 24 hour   Intake 0 ml   Output 900 ml   Net -900 ml       Physical Exam   Constitutional: She is oriented to person, place, and time and well-developed, well-nourished, and in no distress. No distress.   HENT:   Head: Normocephalic and atraumatic.   Right Ear: External ear normal.   Left Ear: External ear normal.   Bilateral orbital edema with heliotropic rash - resolved   Eyes: Conjunctivae and EOM are normal. Pupils are equal, round, and reactive to light.   Neck: Normal range of motion. Neck supple.   Cardiovascular: Normal rate, regular rhythm, normal heart sounds and intact distal pulses.    Pulmonary/Chest: Effort normal and breath sounds normal. No respiratory distress.   Abdominal: Soft. Bowel sounds are normal.   Neurological: She is alert and oriented to person, place, and time. GCS score is 15.   Skin: Skin is warm and dry. No rash noted.     Hyperpigmented non raised rash over nape of neck, elbows, knuckles (resemble Gottron's papules), thighs, and ankles.        Psychiatric: Mood, memory, affect and judgment normal.   Musculoskeletal: Normal range of motion. She exhibits edema. She exhibits no tenderness or deformity.   No signs of synovitis. ROM intact   Inability to move bilateral UE above 45 degrees simultaneously.  Able to move a little past 120 on each one alone  3/5 strength of bilateral UE muscles (L side weaker than R)  4/5 of bilateral LE muscles  Bilateral UE edema with skin thickening         Significant Labs:  Recent Results (from the past 48 hour(s))   Comprehensive Metabolic Panel (CMP)    Collection Time: 01/27/19  4:21 AM   Result Value Ref Range    Sodium 138 136 - 145 mmol/L    Potassium 3.2 (L) 3.5 - 5.1 mmol/L     Chloride 101 95 - 110 mmol/L    CO2 28 23 - 29 mmol/L    Glucose 120 (H) 70 - 110 mg/dL    BUN, Bld 12 6 - 20 mg/dL    Creatinine 0.6 0.5 - 1.4 mg/dL    Calcium 8.5 (L) 8.7 - 10.5 mg/dL    Total Protein 5.9 (L) 6.0 - 8.4 g/dL    Albumin 2.7 (L) 3.5 - 5.2 g/dL    Total Bilirubin 0.4 0.1 - 1.0 mg/dL    Alkaline Phosphatase 80 55 - 135 U/L     (H) 10 - 40 U/L    ALT 63 (H) 10 - 44 U/L    Anion Gap 9 8 - 16 mmol/L    eGFR if African American >60.0 >60 mL/min/1.73 m^2    eGFR if non African American >60.0 >60 mL/min/1.73 m^2   Magnesium    Collection Time: 01/27/19  4:21 AM   Result Value Ref Range    Magnesium 2.0 1.6 - 2.6 mg/dL   Phosphorus    Collection Time: 01/27/19  4:21 AM   Result Value Ref Range    Phosphorus 2.9 2.7 - 4.5 mg/dL   CK    Collection Time: 01/27/19  4:21 AM   Result Value Ref Range    CPK 2076 (H) 20 - 180 U/L   CBC auto differential    Collection Time: 01/27/19  4:21 AM   Result Value Ref Range    WBC 11.21 3.90 - 12.70 K/uL    RBC 3.73 (L) 4.00 - 5.40 M/uL    Hemoglobin 9.7 (L) 12.0 - 16.0 g/dL    Hematocrit 30.7 (L) 37.0 - 48.5 %    MCV 82 82 - 98 fL    MCH 26.0 (L) 27.0 - 31.0 pg    MCHC 31.6 (L) 32.0 - 36.0 g/dL    RDW 17.1 (H) 11.5 - 14.5 %    Platelets 201 150 - 350 K/uL    MPV 9.8 9.2 - 12.9 fL    Immature Granulocytes 1.3 (H) 0.0 - 0.5 %    Gran # (ANC) 9.4 (H) 1.8 - 7.7 K/uL    Immature Grans (Abs) 0.15 (H) 0.00 - 0.04 K/uL    Lymph # 1.0 1.0 - 4.8 K/uL    Mono # 0.7 0.3 - 1.0 K/uL    Eos # 0.0 0.0 - 0.5 K/uL    Baso # 0.01 0.00 - 0.20 K/uL    nRBC 0 0 /100 WBC    Gran% 83.8 (H) 38.0 - 73.0 %    Lymph% 8.5 (L) 18.0 - 48.0 %    Mono% 6.3 4.0 - 15.0 %    Eosinophil% 0.0 0.0 - 8.0 %    Basophil% 0.1 0.0 - 1.9 %    Differential Method Automated    Aldolase    Collection Time: 01/27/19  4:21 AM   Result Value Ref Range    Aldolase 13.1 (H) 1.2 - 7.6 U/L   Comprehensive Metabolic Panel (CMP)    Collection Time: 01/28/19  4:00 AM   Result Value Ref Range    Sodium 138 136 - 145 mmol/L     Potassium 3.4 (L) 3.5 - 5.1 mmol/L    Chloride 99 95 - 110 mmol/L    CO2 29 23 - 29 mmol/L    Glucose 119 (H) 70 - 110 mg/dL    BUN, Bld 15 6 - 20 mg/dL    Creatinine 0.6 0.5 - 1.4 mg/dL    Calcium 8.9 8.7 - 10.5 mg/dL    Total Protein 6.2 6.0 - 8.4 g/dL    Albumin 2.9 (L) 3.5 - 5.2 g/dL    Total Bilirubin 0.5 0.1 - 1.0 mg/dL    Alkaline Phosphatase 80 55 - 135 U/L     (H) 10 - 40 U/L    ALT 70 (H) 10 - 44 U/L    Anion Gap 10 8 - 16 mmol/L    eGFR if African American >60.0 >60 mL/min/1.73 m^2    eGFR if non African American >60.0 >60 mL/min/1.73 m^2   Magnesium    Collection Time: 01/28/19  4:00 AM   Result Value Ref Range    Magnesium 2.1 1.6 - 2.6 mg/dL   Phosphorus    Collection Time: 01/28/19  4:00 AM   Result Value Ref Range    Phosphorus 3.2 2.7 - 4.5 mg/dL   CK    Collection Time: 01/28/19  4:00 AM   Result Value Ref Range    CPK 2249 (H) 20 - 180 U/L   CBC auto differential    Collection Time: 01/28/19  4:00 AM   Result Value Ref Range    WBC 10.95 3.90 - 12.70 K/uL    RBC 3.78 (L) 4.00 - 5.40 M/uL    Hemoglobin 10.4 (L) 12.0 - 16.0 g/dL    Hematocrit 32.0 (L) 37.0 - 48.5 %    MCV 85 82 - 98 fL    MCH 27.5 27.0 - 31.0 pg    MCHC 32.5 32.0 - 36.0 g/dL    RDW 17.2 (H) 11.5 - 14.5 %    Platelets 210 150 - 350 K/uL    MPV 9.5 9.2 - 12.9 fL    Immature Granulocytes 0.9 (H) 0.0 - 0.5 %    Gran # (ANC) 9.4 (H) 1.8 - 7.7 K/uL    Immature Grans (Abs) 0.10 (H) 0.00 - 0.04 K/uL    Lymph # 0.8 (L) 1.0 - 4.8 K/uL    Mono # 0.6 0.3 - 1.0 K/uL    Eos # 0.0 0.0 - 0.5 K/uL    Baso # 0.01 0.00 - 0.20 K/uL    nRBC 0 0 /100 WBC    Gran% 85.5 (H) 38.0 - 73.0 %    Lymph% 7.7 (L) 18.0 - 48.0 %    Mono% 5.8 4.0 - 15.0 %    Eosinophil% 0.0 0.0 - 8.0 %    Basophil% 0.1 0.0 - 1.9 %    Differential Method Automated    Aldolase    Collection Time: 01/28/19  4:00 AM   Result Value Ref Range    Aldolase 15.4 (H) 1.2 - 7.6 U/L     Results for ROMEO PEREZ (MRN [id number]) as of 1/28/2019 10:23   Ref. Range 1/22/2019 11:19  1/22/2019 22:24 1/23/2019 03:00 1/24/2019 03:40 1/25/2019 04:20 1/26/2019 04:00 1/27/2019 04:21 1/28/2019 04:00   CPK Latest Ref Range: 20 - 180 U/L 4562 (H) 4319 (H) 3831 (H) 2747 (H) 2224 (H) 1947 (H) 2076 (H) 2249 (H)   Results for ROMEO PEREZ (MRN [id number]) as of 1/28/2019 10:23   Ref. Range 1/24/2019 03:40 1/25/2019 04:20 1/26/2019 04:00 1/27/2019 04:21 1/28/2019 04:00   Aldolase Latest Ref Range: 1.2 - 7.6 U/L 12.7 (H) 14.2 (H) 13.7 (H) 13.1 (H) 15.4 (H)   Results for ROMEO PEREZ (MRN [id number]) as of 1/28/2019 10:23   Ref. Range 1/22/2019 22:24   Sed Rate Latest Ref Range: 0 - 36 mm/Hr 50 (H)     Results for ROMEO PEREZ (MRN [id number]) as of 1/28/2019 10:23   Ref. Range 1/22/2019 22:24   CRP Latest Ref Range: 0.0 - 8.2 mg/L 31.1 (H)      Results for ROMEO PEREZ (MRN [id number]) as of 1/28/2019 10:23   Ref. Range 1/22/2019 22:24 1/25/2019 17:33   DARCY HEP-2 Titer Unknown Positive 1:640 Sp...    Anti-SSA Antibody Latest Ref Range: 0.00 - 19.99 EU 0.49    Anti-SSA Interpretation Latest Ref Range: Negative  Negative    Anti-SSB Antibody Latest Ref Range: 0.00 - 19.99 EU 0.11    Anti-SSB Interpretation Latest Ref Range: Negative  Negative    ds DNA Ab Latest Ref Range: Negative 1:10  Negative 1:10    Anti Sm Antibody Latest Ref Range: 0.00 - 19.99 EU 0.58    Anti-Sm Interpretation Latest Ref Range: Negative  Negative    Anti Sm/RNP Antibody Latest Ref Range: 0.00 - 19.99 EU 0.62    Anti-Sm/RNP Interpretation Latest Ref Range: Negative  Negative    DARCY Screen Latest Ref Range: Negative <1:160  Positive (A)    Complement (C-3) Latest Ref Range: 50 - 180 mg/dL  106   Complement (C-4) Latest Ref Range: 11 - 44 mg/dL  29   Meg-1 Autoantibodies Latest Ref Range: <1.0 Index <1.00    Results for ROMEO PEREZ CLAY (MRN [id number]) as of 1/28/2019 10:23   Ref. Range 1/23/2019 02:55 1/23/2019 02:56   Specimen UA Unknown Urine, Clean Catch    Color, UA Latest Ref Range: Yellow, Straw, [place]  Colorless (A)     pH, UA Latest Ref Range: 5.0 - 8.0  6.0    Specific Gravity, UA Latest Ref Range: 1.005 - 1.030  1.005    Appearance, UA Latest Ref Range: Clear  Clear    Protein, UA Latest Ref Range: Negative  Negative    Glucose, UA Latest Ref Range: Negative  Negative    Ketones, UA Latest Ref Range: Negative  Negative    Occult Blood UA Latest Ref Range: Negative  Negative    Nitrite, UA Latest Ref Range: Negative  Negative    Bilirubin (UA) Latest Ref Range: Negative  Negative    Leukocytes, UA Latest Ref Range: Negative  Trace (A)    RBC, UA Latest Ref Range: 0 - 4 /hpf  2   WBC, UA Latest Ref Range: 0 - 5 /hpf  3   Squam Epithel, UA Latest Units: /hpf  0   Microscopic Comment Unknown  SEE COMMENT     Significant Imaging:  MRI Humerus w/ and w/o contrast:  Impression       1. Diffuse edema and enhancement of the visualized left upper extremity musculature, most pronounced proximally, most notably of the deltoid and biceps musculature as detailed above.  Findings are nonspecific although could relate to a nonspecific myelitis particularly in light of patient's reported history.  Clinical correlation with appropriate history and lab markers advised.  2. No evidence of abscess or osteomyelitis.  This report was flagged in Epic as abnormal.     NM PET CT 12/27/18:  Impression       See above    Significant improvement in all the previously seen lymph nodes involving the left lower neck, supraclavicular region, axillary and retropectoral region.    Index left retropectoral SUV max 3.57, previously 27.2.     Skin biopsy 1/24/19: - interface vacuolar dermatitis

## 2019-01-27 NOTE — ASSESSMENT & PLAN NOTE
-cont Nystatin swish and swallow. Additionally, added fluconaozle 200 mg Po qday on 01/26 as possibility patient is experiencing esophagitis 2/2 to candida  -appears to be improving with fluconazole

## 2019-01-27 NOTE — ASSESSMENT & PLAN NOTE
Patient is experiencing difficulty swallowing that has been increasing in severity over last couple of days to the point where patient did not feel as if she could swallow.  As of 01/26, SLP evaluated patient and determined that she should be NPO except for medications until a modified barium swallow could be performed. Possibly candidal esophagitis; patient has oral thrush.    Plan:  -follow up modified barium study  -continue to appreciate SLP recs  -continue nystatin mouthwash  -fluconazole 200 mg PO qday

## 2019-01-27 NOTE — PLAN OF CARE
Problem: Occupational Therapy Goal  Goal: Occupational Therapy Goal  Goals to be met by 2/5/2019    Pt will be indep w/ BUE HEP.  Pt will indep feed.   Initiate OT POC     Comments: Rudy Brady OTR/L  1/27/2019

## 2019-01-27 NOTE — ASSESSMENT & PLAN NOTE
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   - F/u 1/24 skin biopsy. Pending results may need muscle biopsy by general surgery  -MRI left humerus suspicious for myositis  -per rheum recess, methylprednisolone 125 mg IV BID. Awaiting further recommendations pending biopsy results regarding steroid taper vs steroid-sparing immunomodulating agent

## 2019-01-27 NOTE — PLAN OF CARE
Problem: Adult Inpatient Plan of Care  Goal: Plan of Care Review  Outcome: Ongoing (interventions implemented as appropriate)  Pt AAO; minimal assistance . Vital signs stable and pt remained afebrile throughout shift.   IV fluids dcd this shift. Potassium K riders hung times 4 bags.Pt remained free from falls during shift. Swelling still noted in face and edema in extremities . Patient not clear to swallow, remains NPO. Suction at bedside   Bed lowest position and locked.  Call light in reach.  WCTM

## 2019-01-27 NOTE — ASSESSMENT & PLAN NOTE
"57yo F with history of L sided infiltrating ductal carcinoma of the L breast (dx on Jan 2017), HTN, depression, and gastritis was send from hem/onc clinic for evaluation of possible inflammatory myositis.     Patient started on Atelizumab/Abraxane on 11/7/18. Per chart review, patient noticed rashes on the dorsum of her hands on 11/11/18. Seen in urgent care and given topical steroid cream. Then received two more infusions on Atelizumab/Abraxane on 11/21/18 and 12/5/18. Started to notice puffiness around the eyes on 12/11/18. Received another Abraxane infusion on 12/12/18 but this time with hydrocortisone 50 mg which helped reduce the swelling around the eyes. Developed swelling of the face again on 12/15/18. Next infusion of Abraxane done on 12/19/18 with Solucortef and Atelizumab held. Abraxane given again on 1/3/19 with no IV steroid. Patient developed swelling of the face on 1/4/19 and went to urgent care and given short course of prednisone 20 mg BID. On 1/15/19, patient seen in hem/onc clinic with c/o of pressure and tightness around neck. CT scan of chest and neck did not reveal any vascular compression. Started on prednisone 60 mg with taper. On 1/22/18 patient with c/o proximal muscle weakness. CPK found to be elevated around 4k. Patient admitted and given solumedrol 80 mg IV on 1/22/18. Last infusion of Atelizumab on 12/19/18. Last infusion of Abraxane on 1/3/19. Given Solumedrol 1g x 1 on 1/23/18. Seen by Dermatology on 1/24/18 and biopsy done of skin rash. Given distribution of rashes, there was concern for dermatomyositis. Patient started on Solumedrol 125 mg IV BID at this time.      Labs: ESR 50, CRP 31.1, CPK 4562 (trending downward 2224), Aldolase 13.6.  , ALT 64.  UA normal.  CBC unremarkable.  +DARCY 1:640 speckled with negative profile.  Complements normal.    Case reports of docetaxel related inflammatory myositis had been documented. "...taxanes have been noted to cause disabling but " "transient arthralgia and myalgias; it is important to consider the possibility of inflammatory myopathy as a possible complication in patients undergoing treatment with these agents." - A case of docetaxel induced myositis and review of literature. Austin et al 2015.    Case reports in Rheumatology   Case reports of atezolizumb (immunomodulator) cause of myositis  - Lucy et al 2017 "Myositis as an adverse even of immune checkpoint blockade for cancer therapy.  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   Dermatomyositis can be associated with malignancy.  MRI of LUE showed edema of the deltoid and biceps.      Skin biopsy 1/24/19: FINAL PATHOLOGIC DIAGNOSIS  1. Skin, upper back, punch biopsy:  - VACUOLAR INTERFACE DERMATITIS, CONSISTENT WITH DERMATOMYOSITIS.    Bilateral UE is swollen and puffy.  R/O DVT (bilateral UE US - negative).  Fluids restarted because of dysphagia (thrush vs related myositis?). Pending barium swallow study results. Currently on Nystatin and Fluconazole. Patient is on high dose IV steroids and CPK continues to trend downward with patient reporting a 30-40% improvement in strength.     Inpatient treatments so far:  - Solumedrol 80 mg IV x 1 on 1/22/19  - Solumedrol 1 g IV x 1 on 1/23/19.  - Solumedrol 125 mg IV BID since 1/24/19.    Plan  - Hold paclitaxel agent and atezolizumb at this time as both can cause myositis as AE  - Decrease  methylprednisone 125mg IV BID to 125 mg IV daily. Will consider adding steroid sparing agent such as MTX. Other steroid-sparing considerations include Azathioprine, IVIG.   - oral thrush - management as per primary  - continue to monitor CPK/Aldolase   - pending myomarker panel   - continue daily PT  - f/u with Dr. Swift and Dr. Campos in Rheumatology at discharge.      "

## 2019-01-27 NOTE — PT/OT/SLP EVAL
Occupational Therapy   Evaluation    Name: Ermelinda Verde  MRN: [id number]  Admitting Diagnosis:  Myositis      Recommendations:     Discharge Recommendations: (HH)  Discharge Equipment Recommendations:  none  Barriers to discharge:  None    Assessment:     Ermelinda Verde is a 58 y.o. female with a medical diagnosis of Myositis.  She presents with impairments listed below. Pt displayed deficits for BUE functioning causing pt to require increased assist for ADLs and B/L hand tasks. Pt displayed global deconditioning requiring increased assist for ADLs and mobility at this time. Pt would benefit from skilled OT services to improve independence and overall occupational functioning.     Performance deficits affecting function: decreased ROM, decreased upper extremity function, impaired fine motor, weakness.      Rehab Prognosis: Good; patient would benefit from acute skilled OT services to address these deficits and reach maximum level of function.       Plan:     Patient to be seen 2 x/week to address the above listed problems via self-care/home management, therapeutic activities, therapeutic exercises  · Plan of Care Expires: 02/27/19  · Plan of Care Reviewed with: patient    Subjective     Chief Complaint: BUE weakness   Patient/Family Comments/goals: return home    Occupational Profile:  Living Environment: Pt lives with spouse and 3 grandchildren (youngest 6, oldest 12 yo) in a 1  with 0 MOHIT.  Previous level of function: Indep  Roles and Routines: N/A  Equipment Used at Home:  none  Assistance upon Discharge: Pt has assistance upon D/C.     Pain/Comfort:  · Pain Rating 1: 0/10  · Pain Rating Post-Intervention 1: 0/10    Patients cultural, spiritual, Hinduism conflicts given the current situation:      Objective:     Communicated with: RN prior to session.  Patient found HOB elevated with   upon OT entry to room.    General Precautions: Standard, aspiration, NPO, fall   Orthopedic Precautions:N/A   Braces:  N/A     Occupational Performance:    Bed Mobility:    · Patient completed Scooting/Bridging with stand by assistance  · Patient completed Supine to Sit with stand by assistance  · Patient completed Sit to Supine with stand by assistance    Functional Mobility/Transfers:  · Patient completed Sit <> Stand Transfer with stand by assistance  with  no assistive device   · Functional Mobility: Pt ambulated ~25 ft at sba w/o AD.    Activities of Daily Living:  · Lower Body Dressing: independence donned and doffed socks seated EOB    Cognitive/Visual Perceptual:  Cognitive/Psychosocial Skills:     -       Oriented to: Person, Place, Time and Situation   -       Follows Commands/attention:Follows multistep  commands  -       Communication: clear/fluent  -       Memory: No Deficits noted  -       Safety awareness/insight to disability: intact   -       Mood/Affect/Coping skills/emotional control: Appropriate to situation  Visual/Perceptual:      -Intact      Physical Exam:  Balance:    -       Pt displayed good overall balance   Postural examination/scapula alignment:    -       Rounded shoulders  Skin integrity: Visible skin intact  Upper Extremity Range of Motion:     -       Right Upper Extremity: WFL except shoulder complex to ~80 degrees   -       Left Upper Extremity: WFL except shoulder complex to ~80 degrees   Upper Extremity Strength: -       Right Upper Extremity: 3-/5  -       Left Upper Extremity: 3-/5   Strength:    -       Right Upper Extremity: WFL  -       Left Upper Extremity: WFL  Fine Motor Coordination:    -       Intact  Gross motor coordination:   WFL    AMPAC 6 Click ADL:  AMPAC Total Score: 23    Treatment & Education:  Pt educated on BUE HEP.   OT demo' d BUE HEP exercises.  Pt educated on POC.   Education:    Patient left seated EOB at request with call button in reach    GOALS:   Multidisciplinary Problems     Occupational Therapy Goals        Problem: Occupational Therapy Goal    Goal Priority  "Disciplines Outcome Interventions   Occupational Therapy Goal     OT, PT/OT     Description:  Goals to be met by 2019    Pt will be indep w/ BUE HEP.  Pt will indep feed.                     History:     Past Medical History:   Diagnosis Date    Breast cancer 2017    left    Depression     Diverticulosis     Gastritis     Hypertension     Vitamin B12 deficiency 3/8/2018       Past Surgical History:   Procedure Laterality Date    BIOPSY-SENTINEL NODE Left 2017    [facility] OR    BREAST BIOPSY Left     BREAST RECONSTRUCTION Left 2017     SECTION      COLONOSCOPY  2011    repeat in 10 yrs.    D&C      [id number]-A-CATH Right 10/31/2018    Performed by [facility] at [facility] OR 2ND FLR    KBOWDQFYK-QRIZ-J-CATH Right 2017    [facility] at [facility] OR    KZPDVKCBH-JZUN-H-CATH-neck or chest Fluoro needed Consent AM of surgery Right 10/30/2018    Performed by [facility] at [facility] OR 2ND FLR    MASTECTOMY Left 2017    MASTECTOMY Left 2017    [facility] at [facility]    MYOMECTOMY      PORTACATH PLACEMENT      RECONSTRUCTION-BREAST/FLAP-SCOTT Left 2017    Performed [facility]ell [facility] at [facility]    SENTINEL LYMPH NODE BIOPSY  2017    left    TOTAL REDUCTION MAMMOPLASTY Right 2017       Clinical Decision Makin.  OT Low:  "Pt evaluation falls under low complexity for evaluation coding due to performance deficits noted in 1-3 areas as stated above and 0 co-morbities affecting current functional status. Data obtained from problem focused assessments. No modifications or assistance was required for completion of evaluation. Only brief occupational profile and history review completed."     Time Tracking:     OT Date of Treatment: 19  OT Start Time: 1003  OT Stop Time: 1012  OT Total Time (min): 9 min    Billable Minutes:Evaluation 9 minutes    Rudy DUNN" Caitlyn, OT  1/27/2019

## 2019-01-27 NOTE — SUBJECTIVE & OBJECTIVE
Interval History: Endorses improving strength and possibly improving ability to swallow. NAEON. Patient has no acute complaints.     Oncology Treatment Plan:   OP BREAST DOCETAXEL Q3W    Medications:  Continuous Infusions:  Scheduled Meds:   amLODIPine  10 mg Oral Daily    calcium carbonate  500 mg Oral Daily    cetirizine  10 mg Oral Daily    cyanocobalamin  500 mcg Oral Daily    enoxaparin  40 mg Subcutaneous Daily    escitalopram oxalate  10 mg Oral Daily    famotidine  20 mg Oral BID    fluconazole  200 mg Oral Daily    fluticasone  1 spray Each Nare Daily    methylPREDNISolone sodium succinate  125 mg Intravenous BID    nystatin  500,000 Units Oral QID    potassium chloride  10 mEq Intravenous Q1H    vitamin D  1,000 Units Oral Daily     PRN Meds:acetaminophen, ALPRAZolam, dextrose 50%, dextrose 50%, glucagon (human recombinant), glucose, glucose, insulin aspart U-100, ondansetron, oxyCODONE, polyethylene glycol, promethazine, sodium chloride 0.9%     Review of Systems   Constitutional: Positive for fatigue. Negative for activity change, appetite change, chills, diaphoresis, fever and unexpected weight change.   HENT: Positive for facial swelling and trouble swallowing. Negative for congestion, ear discharge, hearing loss, postnasal drip, sinus pressure, sneezing, sore throat and tinnitus.    Eyes: Negative for photophobia, pain and redness.   Respiratory: Negative for apnea, cough, choking, chest tightness, shortness of breath and stridor.    Cardiovascular: Negative for chest pain, palpitations and leg swelling.   Gastrointestinal: Negative for abdominal pain, anal bleeding, constipation, diarrhea, nausea and rectal pain.   Endocrine: Negative for polyuria.   Genitourinary: Negative for dysuria and hematuria.   Musculoskeletal: Positive for myalgias. Negative for arthralgias, back pain, gait problem, joint swelling, neck pain and neck stiffness.   Skin: Positive for rash. Negative for color  change and pallor.   Allergic/Immunologic: Negative for immunocompromised state.   Neurological: Positive for weakness. Negative for dizziness, seizures and numbness.   Hematological: Positive for adenopathy. Does not bruise/bleed easily.   Psychiatric/Behavioral: Negative for agitation and behavioral problems.     Objective:     Vital Signs (Most Recent):  Temp: 98.1 °F (36.7 °C) (01/27/19 1205)  Pulse: 89 (01/27/19 1205)  Resp: 17 (01/27/19 1205)  BP: (!) 140/82 (01/27/19 1205)  SpO2: (!) 94 % (01/27/19 1205) Vital Signs (24h Range):  Temp:  [98.1 °F (36.7 °C)-98.7 °F (37.1 °C)] 98.1 °F (36.7 °C)  Pulse:  [] 89  Resp:  [14-18] 17  SpO2:  [94 %-100 %] 94 %  BP: (135-167)/(76-90) 140/82     Weight: 82.7 kg (182 lb 5.1 oz)  Body mass index is 30.34 kg/m².  Body surface area is 1.95 meters squared.      Intake/Output Summary (Last 24 hours) at 1/27/2019 1237  Last data filed at 1/27/2019 0745  Gross per 24 hour   Intake 1272.91 ml   Output 2200 ml   Net -927.09 ml       Physical Exam   Constitutional: She is oriented to person, place, and time. She appears well-developed and well-nourished. No distress.   HENT:   Head: Atraumatic.   Nose: Nose normal.   Mouth/Throat: Oropharyngeal exudate (white tongue exudate-improved from exam prior) present.   Face appears swollen   Eyes: Conjunctivae and EOM are normal. Pupils are equal, round, and reactive to light. Right eye exhibits no discharge. Left eye exhibits no discharge. No scleral icterus.   Neck: Normal range of motion. Neck supple. No tracheal deviation present.   Cardiovascular: Normal rate, regular rhythm and intact distal pulses. Exam reveals no gallop and no friction rub.   Pulmonary/Chest: Effort normal and breath sounds normal. No respiratory distress. She has no wheezes. She has no rales. She exhibits no tenderness.   Abdominal: Soft. Bowel sounds are normal. She exhibits no distension and no mass. There is no tenderness. There is no rebound and no  guarding.   Musculoskeletal: Normal range of motion. She exhibits edema (facial edema, upper extremity edema b/l). She exhibits no tenderness or deformity.   Neurological: She is alert and oriented to person, place, and time. No cranial nerve deficit or sensory deficit.     4/5 shoulder strength on abduction; range of motion limited by weakness. Range of motion still limited by weakness but improved from exam prior. Around 30 degrees above shoulder plane bilaterally  5/5 extension of forearms strength against resistance   Skin: Skin is warm and dry. Capillary refill takes less than 2 seconds. No rash noted. She is not diaphoretic. No erythema. No pallor.   Psychiatric: She has a normal mood and affect.       Significant Labs:   CBC:   Recent Labs   Lab 01/26/19 0400 01/27/19 0421   WBC 13.94* 11.21   HGB 9.7* 9.7*   HCT 30.2* 30.7*    201    and CMP:   Recent Labs   Lab 01/26/19  0400 01/27/19  0421    138   K 3.3* 3.2*    101   CO2 27 28   * 120*   BUN 13 12   CREATININE 0.6 0.6   CALCIUM 8.6* 8.5*   PROT 5.8* 5.9*   ALBUMIN 2.6* 2.7*   BILITOT 0.4 0.4   ALKPHOS 79 80   * 166*   ALT 58* 63*   ANIONGAP 9 9   EGFRNONAA >60.0 >60.0       Diagnostic Results:  I have reviewed and interpreted all pertinent imaging results/findings within the past 24 hours.

## 2019-01-27 NOTE — PROGRESS NOTES
[facility]  Hematology/Oncology  Progress Note    Patient Name: Ermelinda Verde  Admission Date: 1/22/2019  Hospital Length of Stay: 5 days  Code Status: Full Code     Subjective:     HPI:  Ms Verde is a 59 yo F with a prior diagnosis of L sided breast cancer, s/p 3 cycles of nab-paclitaxel and atezolizumab who presents from clinic with a roughly week long, multi-day history of proximal muscle weakness in the shoulder girdle muscles, bilateral and associated with mild tenderness. She reports generalized weakness, but strength impairment with the use of her upper extremities more than lower extremities, and her ADLs are intact. Her last PET scan in December 2018 showed almost complete resolution of her adenopathy, and she had been complaining of a rash, which had been attributed to Nab paclitaxel. Last week, 1/15, she had a CT neck/ since there was concern for facial swelling per symptoms, and the CT showed lymphadenopathy without airway or vascular compromise. 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 Most recently, the patient had been on 60mg of prednisone with a taper and had noted swelling, proximal weakness. She also notes some progressive swallowing difficulty, but attributes this to candidal thrush for which she takes nystatin scheduled. She reports poor PO intake preceding admission, dark, mario colored urine, but denies fevers or joint pain.      Onc History per Dr. Reyna:   She developed a palpable abnormality in her left breast in January 2017 which she noted on self-examination.  A diagnostic mammogram on January 19 showed a greater than 1 cm nodule in the upper outer portion of left breast.  By ultrasound this was lobulated and hypoechoic measuring 1.75 x 1.51 x 1.96 cm.     On January 24,  2017 a core needle biopsy was performed which showed infiltrating ductal carcinoma, high grade.  The tumor was ER negative, AR negative, and HER-2 negative.  A follow-up ultrasound on December 6 showed 2.5 x 2.2 x 1.5 cm left breast mass.  There was no abnormality noted in the left axilla.     She underwent sentinel lymph node biopsy on February 22.  That showed 4 negative lymph nodes.     She had 4 cycles of  Wilbur-adjuvantTaxotere and Cytoxan completed  on 5/9/17.     On June 26 she underwent left mastectomy.  That revealed 2 foci of invasive high-grade carcinoma measuring 14 mm and 1.5 mm.  Margins were negative.     She completed 4 cycles of adjuvant Adriamycin on September 12, 2017.     In October, she developed some left supraclavicular lymphadenopathy which turned out to be recurrence.     A fine-needle aspirate of the lymph node was performed on October 19th.  That showed metastatic carcinoma consistent with breast primary which was ER negative, AR negative and HER 2-negative.           Interval History: Endorses improving strength and possibly improving ability to swallow. NAEON. Patient has no acute complaints.     Oncology Treatment Plan:   OP BREAST DOCETAXEL Q3W    Medications:  Continuous Infusions:  Scheduled Meds:   amLODIPine  10 mg Oral Daily    calcium carbonate  500 mg Oral Daily    cetirizine  10 mg Oral Daily    cyanocobalamin  500 mcg Oral Daily    enoxaparin  40 mg Subcutaneous Daily    escitalopram oxalate  10 mg Oral Daily    famotidine  20 mg Oral BID    fluconazole  200 mg Oral Daily    fluticasone  1 spray Each Nare Daily    methylPREDNISolone sodium succinate  125 mg Intravenous BID    nystatin  500,000 Units Oral QID    potassium chloride  10 mEq Intravenous Q1H    vitamin D  1,000 Units Oral Daily     PRN Meds:acetaminophen, ALPRAZolam, dextrose 50%, dextrose 50%, glucagon (human recombinant), glucose, glucose, insulin aspart U-100, ondansetron, oxyCODONE, polyethylene  glycol, promethazine, sodium chloride 0.9%     Review of Systems   Constitutional: Positive for fatigue. Negative for activity change, appetite change, chills, diaphoresis, fever and unexpected weight change.   HENT: Positive for facial swelling and trouble swallowing. Negative for congestion, ear discharge, hearing loss, postnasal drip, sinus pressure, sneezing, sore throat and tinnitus.    Eyes: Negative for photophobia, pain and redness.   Respiratory: Negative for apnea, cough, choking, chest tightness, shortness of breath and stridor.    Cardiovascular: Negative for chest pain, palpitations and leg swelling.   Gastrointestinal: Negative for abdominal pain, anal bleeding, constipation, diarrhea, nausea and rectal pain.   Endocrine: Negative for polyuria.   Genitourinary: Negative for dysuria and hematuria.   Musculoskeletal: Positive for myalgias. Negative for arthralgias, back pain, gait problem, joint swelling, neck pain and neck stiffness.   Skin: Positive for rash. Negative for color change and pallor.   Allergic/Immunologic: Negative for immunocompromised state.   Neurological: Positive for weakness. Negative for dizziness, seizures and numbness.   Hematological: Positive for adenopathy. Does not bruise/bleed easily.   Psychiatric/Behavioral: Negative for agitation and behavioral problems.     Objective:     Vital Signs (Most Recent):  Temp: 98.1 °F (36.7 °C) (01/27/19 1205)  Pulse: 89 (01/27/19 1205)  Resp: 17 (01/27/19 1205)  BP: (!) 140/82 (01/27/19 1205)  SpO2: (!) 94 % (01/27/19 1205) Vital Signs (24h Range):  Temp:  [98.1 °F (36.7 °C)-98.7 °F (37.1 °C)] 98.1 °F (36.7 °C)  Pulse:  [] 89  Resp:  [14-18] 17  SpO2:  [94 %-100 %] 94 %  BP: (135-167)/(76-90) 140/82     Weight: 82.7 kg (182 lb 5.1 oz)  Body mass index is 30.34 kg/m².  Body surface area is 1.95 meters squared.      Intake/Output Summary (Last 24 hours) at 1/27/2019 7870  Last data filed at 1/27/2019 9201  Gross per 24 hour   Intake  1272.91 ml   Output 2200 ml   Net -927.09 ml       Physical Exam   Constitutional: She is oriented to person, place, and time. She appears well-developed and well-nourished. No distress.   HENT:   Head: Atraumatic.   Nose: Nose normal.   Mouth/Throat: Oropharyngeal exudate (white tongue exudate-improved from exam prior) present.   Face appears swollen   Eyes: Conjunctivae and EOM are normal. Pupils are equal, round, and reactive to light. Right eye exhibits no discharge. Left eye exhibits no discharge. No scleral icterus.   Neck: Normal range of motion. Neck supple. No tracheal deviation present.   Cardiovascular: Normal rate, regular rhythm and intact distal pulses. Exam reveals no gallop and no friction rub.   Pulmonary/Chest: Effort normal and breath sounds normal. No respiratory distress. She has no wheezes. She has no rales. She exhibits no tenderness.   Abdominal: Soft. Bowel sounds are normal. She exhibits no distension and no mass. There is no tenderness. There is no rebound and no guarding.   Musculoskeletal: Normal range of motion. She exhibits edema (facial edema, upper extremity edema b/l). She exhibits no tenderness or deformity.   Neurological: She is alert and oriented to person, place, and time. No cranial nerve deficit or sensory deficit.     4/5 shoulder strength on abduction; range of motion limited by weakness. Range of motion still limited by weakness but improved from exam prior. Around 30 degrees above shoulder plane bilaterally  5/5 extension of forearms strength against resistance   Skin: Skin is warm and dry. Capillary refill takes less than 2 seconds. No rash noted. She is not diaphoretic. No erythema. No pallor.   Psychiatric: She has a normal mood and affect.       Significant Labs:   CBC:   Recent Labs   Lab 01/26/19 0400 01/27/19 0421   WBC 13.94* 11.21   HGB 9.7* 9.7*   HCT 30.2* 30.7*    201    and CMP:   Recent Labs   Lab 01/26/19 0400 01/27/19 0421    138   K 3.3*  3.2*    101   CO2 27 28   * 120*   BUN 13 12   CREATININE 0.6 0.6   CALCIUM 8.6* 8.5*   PROT 5.8* 5.9*   ALBUMIN 2.6* 2.7*   BILITOT 0.4 0.4   ALKPHOS 79 80   * 166*   ALT 58* 63*   ANIONGAP 9 9   EGFRNONAA >60.0 >60.0       Diagnostic Results:  I have reviewed and interpreted all pertinent imaging results/findings within the past 24 hours.    Assessment/Plan:     * Myositis    - DDX dermatomyositis de haylie vs related to immunotherapy  - trend CPK daily; downtrending generally with a slight bump on 01/27  -restarted fluids 01/26 due to NPO  - myositis labs suspicious for myosits; rheum on board.   - F/u 1/24 skin biopsy. Pending results may need muscle biopsy by general surgery  -MRI left humerus suspicious for myositis  -per rheum recess, methylprednisolone 125 mg IV BID. Awaiting further recommendations pending biopsy results regarding steroid taper vs steroid-sparing immunomodulating agent     Hypokalemia    Replete prn     Dysphagia    Patient is experiencing difficulty swallowing that has been increasing in severity over last couple of days to the point where patient did not feel as if she could swallow.  As of 01/26, SLP evaluated patient and determined that she should be NPO except for medications until a modified barium swallow could be performed. Possibly candidal esophagitis; patient has oral thrush.    Plan:  -follow up modified barium study  -continue to appreciate SLP recs  -continue nystatin mouthwash  -fluconazole 200 mg PO qday      Swelling of upper arm    -B/L U/S negative for DVTs  -appears less edematous than day prior  -continue to monitor     Dermatitis    - Skin biopsy from 1/24 pending, appreciate dermatology recs     Candidiasis    -cont Nystatin swish and swallow. Additionally, added fluconaozle 200 mg Po qday on 01/26 as possibility patient is experiencing esophagitis 2/2 to candida  -appears to be improving with fluconazole     Non-traumatic rhabdomyolysis    -see  myositis. Continue supportive care, Cr/ electrolyte/ CPK monitoring and aggressive hydration. transaminitis on CMP is likely due to skeletal muscle rhabdo, not hepatic     Drug rash    - Unclear if symptoms related to chemotherapy     Pre-diabetes    -check a1c, 0-5u SSI while on steroids, add prandial insulin as warranted.      Adjustment disorder with depressed mood    -cont home SSRI     Vitamin B12 deficiency    -cont home B12 supplementation      Breast cancer of upper-outer quadrant of left female breast    -On January 24, 2017 a core needle biopsy was performed which showed infiltrating ductal carcinoma, high grade.  The tumor was ER negative, AL negative, and HER-2 negative.  -She had 4 cycles of  Wilbur-adjuvantTaxotere and Cytoxan completed  on 5/9/17.  -On June 26 she underwent left mastectomy.  That revealed 2 foci of invasive high-grade carcinoma measuring 14 mm and 1.5 mm.  Margins were negative.  -She completed 4 cycles of adjuvant Adriamycin on September 12, 2017.  -  A fine-needle aspirate of the lymph node was performed on October 19th.  That showed metastatic carcinoma consistent with breast primary which was ER negative, AL negative and HER 2-negative.    -Per rheumatology: hold paclitaxel agent and atezolizumb at this time - both can cause myositis      Hypertension    -on home amlodipine-benazepril; will hold ACEi for now and observe renal function with CPK elevation in rhabdo.     Plan:   amlodipine 10 mg qday              Som Mills MD  Hematology/Oncology  [facility]

## 2019-01-27 NOTE — PLAN OF CARE
Problem: Adult Inpatient Plan of Care  Goal: Plan of Care Review  Outcome: Ongoing (interventions implemented as appropriate)  Plan of care reviewed at the start of the shift. IV fluids continued overnight. NPO because of difficulty swallowing. Blood pressure elevated during shift, but is asymptomatic, MD aware. Instructed patient to call for assistance. Bed low and locked, call bell within reach, nonskid socks on, pt verbalized understanding. Infection, pressure ulcer, and fall risks precautions maintained. Will continue to monitor.

## 2019-01-27 NOTE — PLAN OF CARE
Problem: Adult Inpatient Plan of Care  Goal: Plan of Care Review  Outcome: Ongoing (interventions implemented as appropriate)  Pt AAO; independent. Vital signs stable and pt remained afebrile throughout shift. Patient receiving IV fluids due to patient now NPO due to difficulty in swallow ability. Yanker and suction canister set up at bedside. Patient meds are crushed and placed in pudding for administration.  Modified Barium swallow eval ordered and will be performed on Monday.  Potassium replaced by IVPB this shift. No complaints of pain , N/V this shift.  Pt remained free from falls during shift.  Bed lowest position and locked.  Call light in reach.  [facility]

## 2019-01-28 LAB
ALBUMIN SERPL BCP-MCNC: 2.9 G/DL
ALDOLASE SERPL-CCNC: 13.1 U/L
ALDOLASE SERPL-CCNC: 13.7 U/L
ALDOLASE SERPL-CCNC: 14.2 U/L
ALDOLASE SERPL-CCNC: 15.4 U/L
ALP SERPL-CCNC: 80 U/L
ALT SERPL W/O P-5'-P-CCNC: 70 U/L
ANION GAP SERPL CALC-SCNC: 10 MMOL/L
AST SERPL-CCNC: 176 U/L
BASOPHILS # BLD AUTO: 0.01 K/UL
BASOPHILS NFR BLD: 0.1 %
BILIRUB SERPL-MCNC: 0.5 MG/DL
BUN SERPL-MCNC: 15 MG/DL
CALCIUM SERPL-MCNC: 8.9 MG/DL
CHLORIDE SERPL-SCNC: 99 MMOL/L
CK SERPL-CCNC: 2249 U/L
CO2 SERPL-SCNC: 29 MMOL/L
CREAT SERPL-MCNC: 0.6 MG/DL
DIFFERENTIAL METHOD: ABNORMAL
EOSINOPHIL # BLD AUTO: 0 K/UL
EOSINOPHIL NFR BLD: 0 %
ERYTHROCYTE [DISTWIDTH] IN BLOOD BY AUTOMATED COUNT: 17.2 %
EST. GFR  (AFRICAN AMERICAN): >60 ML/MIN/1.73 M^2
EST. GFR  (NON AFRICAN AMERICAN): >60 ML/MIN/1.73 M^2
GGT SERPL-CCNC: 29 U/L
GLUCOSE SERPL-MCNC: 119 MG/DL
HCT VFR BLD AUTO: 32 %
HGB BLD-MCNC: 10.4 G/DL
IGA SERPL-MCNC: 533 MG/DL
IGG SERPL-MCNC: 932 MG/DL
IGM SERPL-MCNC: 66 MG/DL
IMM GRANULOCYTES # BLD AUTO: 0.1 K/UL
IMM GRANULOCYTES NFR BLD AUTO: 0.9 %
LYMPHOCYTES # BLD AUTO: 0.8 K/UL
LYMPHOCYTES NFR BLD: 7.7 %
MAGNESIUM SERPL-MCNC: 2.1 MG/DL
MCH RBC QN AUTO: 27.5 PG
MCHC RBC AUTO-ENTMCNC: 32.5 G/DL
MCV RBC AUTO: 85 FL
MONOCYTES # BLD AUTO: 0.6 K/UL
MONOCYTES NFR BLD: 5.8 %
NEUTROPHILS # BLD AUTO: 9.4 K/UL
NEUTROPHILS NFR BLD: 85.5 %
NRBC BLD-RTO: 0 /100 WBC
PHOSPHATE SERPL-MCNC: 3.2 MG/DL
PLATELET # BLD AUTO: 210 K/UL
PMV BLD AUTO: 9.5 FL
POTASSIUM SERPL-SCNC: 3.4 MMOL/L
PROT SERPL-MCNC: 6.2 G/DL
RBC # BLD AUTO: 3.78 M/UL
SODIUM SERPL-SCNC: 138 MMOL/L
WBC # BLD AUTO: 10.95 K/UL

## 2019-01-28 PROCEDURE — 85025 COMPLETE CBC W/AUTO DIFF WBC: CPT

## 2019-01-28 PROCEDURE — 25000003 PHARM REV CODE 250: Performed by: INTERNAL MEDICINE

## 2019-01-28 PROCEDURE — 63600175 PHARM REV CODE 636 W HCPCS: Performed by: STUDENT IN AN ORGANIZED HEALTH CARE EDUCATION/TRAINING PROGRAM

## 2019-01-28 PROCEDURE — 86704 HEP B CORE ANTIBODY TOTAL: CPT

## 2019-01-28 PROCEDURE — 84100 ASSAY OF PHOSPHORUS: CPT

## 2019-01-28 PROCEDURE — 92611 MOTION FLUOROSCOPY/SWALLOW: CPT

## 2019-01-28 PROCEDURE — 63600175 PHARM REV CODE 636 W HCPCS: Performed by: INTERNAL MEDICINE

## 2019-01-28 PROCEDURE — 86787 VARICELLA-ZOSTER ANTIBODY: CPT

## 2019-01-28 PROCEDURE — 86682 HELMINTH ANTIBODY: CPT

## 2019-01-28 PROCEDURE — 86592 SYPHILIS TEST NON-TREP QUAL: CPT

## 2019-01-28 PROCEDURE — 86790 VIRUS ANTIBODY NOS: CPT

## 2019-01-28 PROCEDURE — 99233 SBSQ HOSP IP/OBS HIGH 50: CPT | Mod: ,,, | Performed by: INTERNAL MEDICINE

## 2019-01-28 PROCEDURE — 86480 TB TEST CELL IMMUN MEASURE: CPT

## 2019-01-28 PROCEDURE — 82550 ASSAY OF CK (CPK): CPT

## 2019-01-28 PROCEDURE — S0028 INJECTION, FAMOTIDINE, 20 MG: HCPCS | Performed by: INTERNAL MEDICINE

## 2019-01-28 PROCEDURE — 99233 PR SUBSEQUENT HOSPITAL CARE,LEVL III: ICD-10-PCS | Mod: ,,, | Performed by: INTERNAL MEDICINE

## 2019-01-28 PROCEDURE — 87081 CULTURE SCREEN ONLY: CPT

## 2019-01-28 PROCEDURE — 80053 COMPREHEN METABOLIC PANEL: CPT

## 2019-01-28 PROCEDURE — 87340 HEPATITIS B SURFACE AG IA: CPT

## 2019-01-28 PROCEDURE — 86706 HEP B SURFACE ANTIBODY: CPT

## 2019-01-28 PROCEDURE — 82977 ASSAY OF GGT: CPT

## 2019-01-28 PROCEDURE — 20600001 HC STEP DOWN PRIVATE ROOM

## 2019-01-28 PROCEDURE — 43752 NASAL/OROGASTRIC W/TUBE PLMT: CPT

## 2019-01-28 PROCEDURE — 83735 ASSAY OF MAGNESIUM: CPT

## 2019-01-28 PROCEDURE — 86803 HEPATITIS C AB TEST: CPT

## 2019-01-28 PROCEDURE — 86703 HIV-1/HIV-2 1 RESULT ANTBDY: CPT

## 2019-01-28 PROCEDURE — 82784 ASSAY IGA/IGD/IGG/IGM EACH: CPT | Mod: 59

## 2019-01-28 PROCEDURE — 97110 THERAPEUTIC EXERCISES: CPT

## 2019-01-28 PROCEDURE — 97116 GAIT TRAINING THERAPY: CPT

## 2019-01-28 PROCEDURE — 82085 ASSAY OF ALDOLASE: CPT

## 2019-01-28 RX ORDER — FLUCONAZOLE 2 MG/ML
200 INJECTION, SOLUTION INTRAVENOUS
Status: DISCONTINUED | OUTPATIENT
Start: 2019-01-28 | End: 2019-01-29

## 2019-01-28 RX ORDER — BENAZEPRIL HYDROCHLORIDE 10 MG/1
40 TABLET ORAL DAILY
Status: DISCONTINUED | OUTPATIENT
Start: 2019-01-28 | End: 2019-01-28

## 2019-01-28 RX ORDER — HYDRALAZINE HYDROCHLORIDE 20 MG/ML
10 INJECTION INTRAMUSCULAR; INTRAVENOUS EVERY 8 HOURS
Status: DISCONTINUED | OUTPATIENT
Start: 2019-01-28 | End: 2019-01-29

## 2019-01-28 RX ORDER — METHYLPREDNISOLONE SOD SUCC 125 MG
100 VIAL (EA) INJECTION 2 TIMES DAILY
Status: DISCONTINUED | OUTPATIENT
Start: 2019-01-28 | End: 2019-01-29

## 2019-01-28 RX ORDER — FAMOTIDINE 10 MG/ML
20 INJECTION INTRAVENOUS 2 TIMES DAILY
Status: DISCONTINUED | OUTPATIENT
Start: 2019-01-28 | End: 2019-01-29

## 2019-01-28 RX ORDER — FAMOTIDINE 10 MG/ML
20 INJECTION INTRAVENOUS 2 TIMES DAILY
Status: DISCONTINUED | OUTPATIENT
Start: 2019-01-28 | End: 2019-01-28

## 2019-01-28 RX ORDER — POTASSIUM CHLORIDE 7.45 MG/ML
10 INJECTION INTRAVENOUS
Status: COMPLETED | OUTPATIENT
Start: 2019-01-28 | End: 2019-01-28

## 2019-01-28 RX ORDER — HYDRALAZINE HYDROCHLORIDE 20 MG/ML
10 INJECTION INTRAMUSCULAR; INTRAVENOUS EVERY 6 HOURS PRN
Status: DISCONTINUED | OUTPATIENT
Start: 2019-01-28 | End: 2019-01-29

## 2019-01-28 RX ORDER — HYDRALAZINE HYDROCHLORIDE 20 MG/ML
10 INJECTION INTRAMUSCULAR; INTRAVENOUS EVERY 8 HOURS PRN
Status: DISCONTINUED | OUTPATIENT
Start: 2019-01-28 | End: 2019-01-28

## 2019-01-28 RX ORDER — POTASSIUM CHLORIDE 20 MEQ/1
40 TABLET, EXTENDED RELEASE ORAL 2 TIMES DAILY
Status: DISCONTINUED | OUTPATIENT
Start: 2019-01-28 | End: 2019-01-28

## 2019-01-28 RX ADMIN — NYSTATIN 500000 UNITS: 500000 SUSPENSION ORAL at 10:01

## 2019-01-28 RX ADMIN — HYDRALAZINE HYDROCHLORIDE 10 MG: 20 INJECTION INTRAMUSCULAR; INTRAVENOUS at 09:01

## 2019-01-28 RX ADMIN — POTASSIUM CHLORIDE 10 MEQ: 10 INJECTION, SOLUTION INTRAVENOUS at 02:01

## 2019-01-28 RX ADMIN — METHYLPREDNISOLONE SODIUM SUCCINATE 100 MG: 125 INJECTION, POWDER, FOR SOLUTION INTRAMUSCULAR; INTRAVENOUS at 08:01

## 2019-01-28 RX ADMIN — POTASSIUM CHLORIDE 10 MEQ: 10 INJECTION, SOLUTION INTRAVENOUS at 07:01

## 2019-01-28 RX ADMIN — POTASSIUM CHLORIDE 10 MEQ: 10 INJECTION, SOLUTION INTRAVENOUS at 05:01

## 2019-01-28 RX ADMIN — ENOXAPARIN SODIUM 40 MG: 100 INJECTION SUBCUTANEOUS at 06:01

## 2019-01-28 RX ADMIN — FLUCONAZOLE, SODIUM CHLORIDE 200 MG: 2 INJECTION INTRAVENOUS at 03:01

## 2019-01-28 RX ADMIN — FAMOTIDINE 20 MG: 10 INJECTION, SOLUTION INTRAVENOUS at 03:01

## 2019-01-28 RX ADMIN — METHYLPREDNISOLONE SODIUM SUCCINATE 125 MG: 125 INJECTION, POWDER, FOR SOLUTION INTRAMUSCULAR; INTRAVENOUS at 10:01

## 2019-01-28 RX ADMIN — FAMOTIDINE 20 MG: 10 INJECTION, SOLUTION INTRAVENOUS at 08:01

## 2019-01-28 RX ADMIN — POTASSIUM CHLORIDE 10 MEQ: 10 INJECTION, SOLUTION INTRAVENOUS at 12:01

## 2019-01-28 NOTE — HOSPITAL COURSE
-- Patient presented as a direct admit from clinic with proximal upper extremity muscle weakness suspicious for myositis due to immunotherapy.   -- She was started on high dose steroids and IVF  -- Rheumatology consulted and started patient on Methylprednisolone  mg BID.   -- Derm consulted for skin punch biopsy, the results showed VACUOLAR INTERFACE DERMATITIS, CONSISTENT WITH DERMATOMYOSITIS..   -- She was having dysphagia possibly 2/2 to candidal esophagitis and was started on fluconazole with some improvement. Underwent modified barium study which was concerning for high risk of aspiration and the pt was made strict NPO and ultimately required a PEG tube placement on 02/07/2019 and tolerated well with tube feeds at goal.  -- Her CPK continued to trend downwards and her proximal muscle weakness improved. (Can reverse PEG when swallowing study is passable).  -- GI consulted, EGD resulted w/ Grade C erosive esophagitis, no candidal infxn identified. Bx obtained (Very mild, nonspecific chronic inflammation without eosinophils exhibiting slight elongation of vascular papillae. No viral inclusions, multinucleate cells, or other findings suggestive of viral cytopathic effect are identified. No erosions, ulcerations, or inflammatory exudates are identified) GI recs to start PPI bid  -- On 1/29 pt started on MTX 15mg subQ weekly, and IVIG 400mg/kg x5 days per rheumatology recs  -- Pt will continue Methylpred 48mg po daily until follow up in Rheum clinic with Dr. Swift on 02.15.2019  -- PT/OT recommending IP rehab, and pt is HDS and medically clear for discharge at this time. She will be discharged to rehab facility to receive oral Methotrexate and will follow up in Rheum clinic on 2/15. The tentative plan will be for pt to achieve rehab goals by approximately 2/27 so that around 2/28 she will be able to follow up outpatient to receive infusion of IVIG.

## 2019-01-28 NOTE — PLAN OF CARE
Problem: Physical Therapy Goal  Goal: Physical Therapy Goal  Goals to be met by: 19     Patient will increase functional independence with mobility by performin. Gait  x 500 feet with Supervision without device. - GOAL MET    2. Increased functional strength to 5/5 for BLE.  3. Standing lower extremity exercise program x 15 reps per handout, with supervision.  4. Pt to perf 6MWT: amb 580', to demonstrate a clinically significant improvement in aerobic capacity.    Outcome: Ongoing (interventions implemented as appropriate)  Pt achieved 1 goal, new goal added.

## 2019-01-28 NOTE — PLAN OF CARE
Problem: Adult Inpatient Plan of Care  Goal: Plan of Care Review  Outcome: Ongoing (interventions implemented as appropriate)  Plan of care reviewed at the start of the shift. IV fluids discontinued. NPO because of difficulty swallowing. Instructed patient to call for assistance. Bed low and locked, call bell within reach, nonskid socks on, pt verbalized understanding. Infection, pressure ulcer, and fall risks precautions maintained. Will continue to monitor.

## 2019-01-28 NOTE — PROCEDURES
Modified Barium Swallow    Patient Name:  Ermelinda Verde   MRN:  [id number]      Recommendations:     Recommendations:                General Recommendations:  Dysphagia therapy  Diet recommendations:  NPO, NPO   Aspiration Precautions: Alternate means of nutrition/hydration and Strict aspiration precautions   General Precautions: Standard, aspiration, NPO, fall  Communication strategies:  none    Referral     Reason for Referral  Patient was referred for a Modified Barium Swallow Study to assess the efficiency of his/her swallow function, rule out aspiration and make recommendations regarding safe dietary consistencies, effective compensatory strategies, and safe eating environment.     Diagnosis: Myositis       History:     Past Medical History:   Diagnosis Date    Breast cancer 01/01/2017    left    Depression     Diverticulosis     Gastritis     Hypertension     Vitamin B12 deficiency 3/8/2018       Objective:     Current Respiratory Status: 01/28/19    Alert: yes    Cooperative: yes    Follows Directions: yes    Visualization  · Patient was seen in the lateral view    Oral Peripheral Examination  · Oral Musculature: WFL  · Dentition: present and adequate  · Secretion Management: adequate  · Oral Labial Strength and Mobility: WFL  · Lingual Strength and Mobility: WFL  · Volitional Cough: strong and productive  · Volitional Swallow: strong  · Voice Prior to PO Intake: strong    Consistencies Assessed  · Thin (tsp x1; half tsp x1)  · Nectar thick (tsp x1)  · Puree (half tsp x1)    Oral Preparation/Oral Phase  · WFL- Pt with adequate bolus acceptance, containment, control and timely A-P transfer across consistencies    · Mild base of tongue residue     Pharyngeal Phase   Delayed initiation of pharyngeal phase with premature spillage to the pyriform sinuses.  Laryngeal elevation/excursion was reduced.  Minimal to no epiglottic inversion evident.   Vallecular and pyriform sinus stasis was severe across  consistencies.  Multiple Swallows and Effortful Swallows did not aid in reducing stasis. Pt appeared to have some sensation of stasis.   · Thin Liquid - aspiration observed during and after the swallow, as pt attempted to clear severe stasis with multiple swallows.  Delayed cough triggered and appeared to clear portion of aspirate material.  Pt eventually had to bring bolus back to mouth for removal . Chin tuck was attempted.  This protected airway during the swallow; however, pt remains at high risk for aspiration after the swallow due to severe stasis still present with head brought back to neutral position.   · Nectar Thick Liquid - aspiration observed during and after the swallow with no spontaneous cough.  Pt was able to clear a portion of aspirate material with cued cough.  Again, pt eventually brought a portion of bolus back to mouth to spit out.   · Puree - small portion of puree was attempted, in hopes of weight clearing remaining stasis and forcing UES open to allow bolus flow into esophagus. This was unsuccessful as stasis worsened post puree trials.  No aspiration was observed; however, pt is at high risk given minimal amounts of the bolus is passing the UES into the esophagus.  Study halted following this trial as result and pt was again encouraged to bring material to mouth for removal.       Cervical Esophageal Phase  · Poor UES opening with minimal amounts of bolus passing into esophagus despite max pt effort.     Assessment:     Impressions  ·   Patient demonstrates severe pharyngeal dysphagia characterized by the above..     Prognosis: Fair    Barriers:  · Fatigue     Plan  ST will continue to follow pt for ongoing education, ongoing swallow assessment and trial dysphagia tx .      Education  Results were discussed with patient . Results were discussed with Medical Team who verbalized understanding of aspiration risk.   Results / recs were also discussed with nursing.     Goals:   Multidisciplinary  Problems     SLP Goals        Problem: SLP Goal    Goal Priority Disciplines Outcome   SLP Goal     SLP Ongoing (interventions implemented as appropriate)   Description:  Speech-Language Pathology Goals  Goals expected to be met by 2/2:   1) Pt will participate in ongoing swallow assessment to determine appropriateness of po diet.   2) Pt will participate in MBSS to further assess swallow function.  - met  3) Pt will participate in trial dysphagia tx in attempt to improve strength/safety  of swallow .  4) Pt / family education regarding dysphagia and aspiration risk.                     Plan:   · Patient to be seen:  Therapy Frequency: 5 x/week   · Plan of Care expires:  02/24/19  · Plan of Care reviewed with:  patient        Discharge recommendations:  (HH)     Time Tracking:   SLP Treatment Date:   01/28/19  Speech Start Time:  0845  Speech Stop Time:  0910     Speech Total Time (min):  25 min    IRENA Thacker, CCC-SLP  Speech Language Pathologist  [phone]  1/28/2019

## 2019-01-28 NOTE — PLAN OF CARE
Problem: SLP Goal  Goal: SLP Goal  Speech-Language Pathology Goals  Goals expected to be met by 2/2:   1) Pt will participate in ongoing swallow assessment to determine appropriateness of po diet.   2) Pt will participate in MBSS to further assess swallow function.  - met  3) Pt will participate in trial dysphagia tx in attempt to improve strength/safety  of swallow .  4) Pt / family education regarding dysphagia and aspiration risk.   MBSS completed.  Pt found to have severe pharyngeal dysphagia resulting in aspiration of thin and thick liquid trial.  High risk for aspiration of puree evident as well due to severe pharyngeal stasis.  Recommend pt remain NPO with strict aspiration precautions.  Full Study to follow.     IRENA Thacker, CCC-SLP  Speech Language Pathologist  [phone].  1/28/2019

## 2019-01-28 NOTE — PLAN OF CARE
Problem: Adult Inpatient Plan of Care  Goal: Plan of Care Review  Outcome: Ongoing (interventions implemented as appropriate)  Patient remains free from falls and injury this shift. Bed in low, locked position with call light in reach.  at bedside. Barium swallow study completed today, SLP recc strict NPO, MD aware.  NGT insertion ordered and placed in right nare, pending placement confirmation via xray.  Potassium 40mEq transfused IVPB. Nutrition and dietary consulted. Patient encouraged to call for assistance when getting out of bed.  Patient verbalized understanding. All belongings within reach.  Will continue to monitor.

## 2019-01-28 NOTE — ASSESSMENT & PLAN NOTE
Patient is experiencing difficulty swallowing that has been increasing in severity over last couple of days to the point where patient did not feel as if she could swallow.  As of 01/26, SLP evaluated patient and determined that she should be NPO except for medications until a modified barium swallow could be performed. Possibly candidal esophagitis; patient has oral thrush.    Plan:  - High aspiration risk on modified barium study, strict NPO  - Switched to IV meds where possible  - Fluconazole IVPB 200mg q24hr  -continue to appreciate SLP recs

## 2019-01-28 NOTE — SUBJECTIVE & OBJECTIVE
Interval History: Endorses improving strength and slight decreasing tenderness. NAEON. Patient has no acute complaints.     Oncology Treatment Plan:   OP BREAST DOCETAXEL Q3W    Medications:  Continuous Infusions:  Scheduled Meds:   enoxaparin  40 mg Subcutaneous Daily    famotidine (PF)  20 mg Intravenous BID    fluconazole (DIFLUCAN) IVPB  200 mg Intravenous Q24H    methylPREDNISolone sodium succinate  125 mg Intravenous BID    potassium chloride  10 mEq Intravenous Q1H     PRN Meds:acetaminophen, ALPRAZolam, dextrose 50%, dextrose 50%, glucagon (human recombinant), glucose, glucose, insulin aspart U-100, ondansetron, oxyCODONE, polyethylene glycol, promethazine, sodium chloride 0.9%     Review of Systems   Constitutional: Positive for fatigue. Negative for activity change, appetite change, chills, diaphoresis, fever and unexpected weight change.   HENT: Positive for facial swelling and trouble swallowing. Negative for congestion, ear discharge, hearing loss, postnasal drip, sinus pressure, sneezing, sore throat and tinnitus.    Eyes: Negative for photophobia, pain and redness.   Respiratory: Negative for apnea, cough, choking, chest tightness, shortness of breath and stridor.    Cardiovascular: Negative for chest pain, palpitations and leg swelling.   Gastrointestinal: Negative for abdominal pain, anal bleeding, constipation, diarrhea, nausea and rectal pain.   Endocrine: Negative for polyuria.   Genitourinary: Negative for dysuria and hematuria.   Musculoskeletal: Positive for myalgias. Negative for arthralgias, back pain, gait problem, joint swelling, neck pain and neck stiffness.   Skin: Positive for rash. Negative for color change and pallor.   Allergic/Immunologic: Negative for immunocompromised state.   Neurological: Positive for weakness. Negative for dizziness, seizures and numbness.   Hematological: Positive for adenopathy. Does not bruise/bleed easily.   Psychiatric/Behavioral: Negative for  agitation and behavioral problems.     Objective:     Vital Signs (Most Recent):  Temp: 98.3 °F (36.8 °C) (01/28/19 1203)  Pulse: 86 (01/28/19 1203)  Resp: 18 (01/28/19 1203)  BP: (!) 156/81 (01/28/19 1203)  SpO2: 100 % (01/28/19 1203) Vital Signs (24h Range):  Temp:  [97.7 °F (36.5 °C)-98.5 °F (36.9 °C)] 98.3 °F (36.8 °C)  Pulse:  [82-97] 86  Resp:  [14-20] 18  SpO2:  [95 %-100 %] 100 %  BP: (131-171)/(70-81) 156/81     Weight: 79.9 kg (176 lb 0.6 oz)  Body mass index is 29.29 kg/m².  Body surface area is 1.91 meters squared.      Intake/Output Summary (Last 24 hours) at 1/28/2019 1318  Last data filed at 1/28/2019 0800  Gross per 24 hour   Intake 0 ml   Output 900 ml   Net -900 ml       Physical Exam   Constitutional: She is oriented to person, place, and time. She appears well-developed and well-nourished. No distress.   HENT:   Head: Atraumatic.   Nose: Nose normal.   Mouth/Throat: Oropharyngeal exudate (white tongue exudate-improved from exam prior) present.   Face appears swollen   Eyes: Conjunctivae and EOM are normal. Pupils are equal, round, and reactive to light. Right eye exhibits no discharge. Left eye exhibits no discharge. No scleral icterus.   Neck: Normal range of motion. Neck supple. No tracheal deviation present.   Cardiovascular: Normal rate, regular rhythm and intact distal pulses. Exam reveals no gallop and no friction rub.   Pulmonary/Chest: Effort normal and breath sounds normal. No respiratory distress. She has no wheezes. She has no rales. She exhibits no tenderness.   Abdominal: Soft. Bowel sounds are normal. She exhibits no distension and no mass. There is no tenderness. There is no rebound and no guarding.   Musculoskeletal: Normal range of motion. She exhibits edema (facial edema, upper extremity edema b/l). She exhibits no tenderness or deformity.   Neurological: She is alert and oriented to person, place, and time. No cranial nerve deficit or sensory deficit.     4/5 shoulder strength  on abduction; range of motion limited by weakness. Range of motion still limited by weakness but improved from exam prior. Around 30 degrees above shoulder plane bilaterally  5/5 extension of forearms strength against resistance   Skin: Skin is warm and dry. Capillary refill takes less than 2 seconds. No rash noted. She is not diaphoretic. No erythema. No pallor.   Psychiatric: She has a normal mood and affect.       Significant Labs:   CBC:   Recent Labs   Lab 01/27/19  0421 01/28/19  0400   WBC 11.21 10.95   HGB 9.7* 10.4*   HCT 30.7* 32.0*    210    and CMP:   Recent Labs   Lab 01/27/19  0421 01/28/19  0400    138   K 3.2* 3.4*    99   CO2 28 29   * 119*   BUN 12 15   CREATININE 0.6 0.6   CALCIUM 8.5* 8.9   PROT 5.9* 6.2   ALBUMIN 2.7* 2.9*   BILITOT 0.4 0.5   ALKPHOS 80 80   * 176*   ALT 63* 70*   ANIONGAP 9 10   EGFRNONAA >60.0 >60.0       Diagnostic Results:  I have reviewed and interpreted all pertinent imaging results/findings within the past 24 hours.

## 2019-01-28 NOTE — PHARMACY MED REC
"Admission Medication Reconciliation - Pharmacy Consult Note    The home medication history was taken by Annamaria Thompson PharmD.  Based on information gathered and subsequent review by the clinical pharmacist, the items below may need attention.    You may go to "Admission" then "Reconcile Home Medications" tabs to review and/or act upon these items.        No issues noted with the medication reconciliation.        Potential issues to be addressed PRIOR TO DISCHARGE  o The following are medications that the patient was taking prior to admit - consider restarting once able to tolerate PO medications  o Amlodipine/benzepril 10/40mg PO daily  o Calcium carbonate 500mg PO daily  o Vitamin D 1000 units PO daily  o Vitamin B12 500mcg PO daily  o Nystatin 100,000unit/mL suspension - 5mL PO QID    Please address this information as you see fit.  Feel free to contact us if you have any questions or require assistance.    Annamaria Thompson PharmD, [facility]  [id number]                .    .          "

## 2019-01-28 NOTE — PROGRESS NOTES
[facility]  Hematology/Oncology  Progress Note    Patient Name: Ermelinda Verde  Admission Date: 1/22/2019  Hospital Length of Stay: 6 days  Code Status: Full Code     Subjective:     HPI:  Ms Verde is a 57 yo F with a prior diagnosis of L sided breast cancer, s/p 3 cycles of nab-paclitaxel and atezolizumab who presents from clinic with a roughly week long, multi-day history of proximal muscle weakness in the shoulder girdle muscles, bilateral and associated with mild tenderness. She reports generalized weakness, but strength impairment with the use of her upper extremities more than lower extremities, and her ADLs are intact. Her last PET scan in December 2018 showed almost complete resolution of her adenopathy, and she had been complaining of a rash, which had been attributed to Nab paclitaxel. Last week, 1/15, she had a CT neck/ since there was concern for facial swelling per symptoms, and the CT showed lymphadenopathy without airway or vascular compromise. 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 Most recently, the patient had been on 60mg of prednisone with a taper and had noted swelling, proximal weakness. She also notes some progressive swallowing difficulty, but attributes this to candidal thrush for which she takes nystatin scheduled. She reports poor PO intake preceding admission, dark, mario colored urine, but denies fevers or joint pain.      Onc History per Dr. Reyna:   She developed a palpable abnormality in her left breast in January 2017 which she noted on self-examination.  A diagnostic mammogram on January 19 showed a greater than 1 cm nodule in the upper outer portion of left breast.  By ultrasound this was lobulated and hypoechoic measuring 1.75 x 1.51 x 1.96 cm.     On January 24,  2017 a core needle biopsy was performed which showed infiltrating ductal carcinoma, high grade.  The tumor was ER negative, AZ negative, and HER-2 negative.  A follow-up ultrasound on December 6 showed 2.5 x 2.2 x 1.5 cm left breast mass.  There was no abnormality noted in the left axilla.     She underwent sentinel lymph node biopsy on February 22.  That showed 4 negative lymph nodes.     She had 4 cycles of  Wilbur-adjuvantTaxotere and Cytoxan completed  on 5/9/17.     On June 26 she underwent left mastectomy.  That revealed 2 foci of invasive high-grade carcinoma measuring 14 mm and 1.5 mm.  Margins were negative.     She completed 4 cycles of adjuvant Adriamycin on September 12, 2017.     In October, she developed some left supraclavicular lymphadenopathy which turned out to be recurrence.     A fine-needle aspirate of the lymph node was performed on October 19th.  That showed metastatic carcinoma consistent with breast primary which was ER negative, AZ negative and HER 2-negative.           Interval History: Endorses improving strength and slight decreasing tenderness. NAEON. Patient has no acute complaints.     Oncology Treatment Plan:   OP BREAST DOCETAXEL Q3W    Medications:  Continuous Infusions:  Scheduled Meds:   enoxaparin  40 mg Subcutaneous Daily    famotidine (PF)  20 mg Intravenous BID    fluconazole (DIFLUCAN) IVPB  200 mg Intravenous Q24H    methylPREDNISolone sodium succinate  125 mg Intravenous BID    potassium chloride  10 mEq Intravenous Q1H     PRN Meds:acetaminophen, ALPRAZolam, dextrose 50%, dextrose 50%, glucagon (human recombinant), glucose, glucose, insulin aspart U-100, ondansetron, oxyCODONE, polyethylene glycol, promethazine, sodium chloride 0.9%     Review of Systems   Constitutional: Positive for fatigue. Negative for activity change, appetite change, chills, diaphoresis, fever and unexpected weight change.   HENT: Positive for facial swelling and trouble swallowing. Negative for  congestion, ear discharge, hearing loss, postnasal drip, sinus pressure, sneezing, sore throat and tinnitus.    Eyes: Negative for photophobia, pain and redness.   Respiratory: Negative for apnea, cough, choking, chest tightness, shortness of breath and stridor.    Cardiovascular: Negative for chest pain, palpitations and leg swelling.   Gastrointestinal: Negative for abdominal pain, anal bleeding, constipation, diarrhea, nausea and rectal pain.   Endocrine: Negative for polyuria.   Genitourinary: Negative for dysuria and hematuria.   Musculoskeletal: Positive for myalgias. Negative for arthralgias, back pain, gait problem, joint swelling, neck pain and neck stiffness.   Skin: Positive for rash. Negative for color change and pallor.   Allergic/Immunologic: Negative for immunocompromised state.   Neurological: Positive for weakness. Negative for dizziness, seizures and numbness.   Hematological: Positive for adenopathy. Does not bruise/bleed easily.   Psychiatric/Behavioral: Negative for agitation and behavioral problems.     Objective:     Vital Signs (Most Recent):  Temp: 98.3 °F (36.8 °C) (01/28/19 1203)  Pulse: 86 (01/28/19 1203)  Resp: 18 (01/28/19 1203)  BP: (!) 156/81 (01/28/19 1203)  SpO2: 100 % (01/28/19 1203) Vital Signs (24h Range):  Temp:  [97.7 °F (36.5 °C)-98.5 °F (36.9 °C)] 98.3 °F (36.8 °C)  Pulse:  [82-97] 86  Resp:  [14-20] 18  SpO2:  [95 %-100 %] 100 %  BP: (131-171)/(70-81) 156/81     Weight: 79.9 kg (176 lb 0.6 oz)  Body mass index is 29.29 kg/m².  Body surface area is 1.91 meters squared.      Intake/Output Summary (Last 24 hours) at 1/28/2019 1318  Last data filed at 1/28/2019 0800  Gross per 24 hour   Intake 0 ml   Output 900 ml   Net -900 ml       Physical Exam   Constitutional: She is oriented to person, place, and time. She appears well-developed and well-nourished. No distress.   HENT:   Head: Atraumatic.   Nose: Nose normal.   Mouth/Throat: Oropharyngeal exudate (white tongue  exudate-improved from exam prior) present.   Face appears swollen   Eyes: Conjunctivae and EOM are normal. Pupils are equal, round, and reactive to light. Right eye exhibits no discharge. Left eye exhibits no discharge. No scleral icterus.   Neck: Normal range of motion. Neck supple. No tracheal deviation present.   Cardiovascular: Normal rate, regular rhythm and intact distal pulses. Exam reveals no gallop and no friction rub.   Pulmonary/Chest: Effort normal and breath sounds normal. No respiratory distress. She has no wheezes. She has no rales. She exhibits no tenderness.   Abdominal: Soft. Bowel sounds are normal. She exhibits no distension and no mass. There is no tenderness. There is no rebound and no guarding.   Musculoskeletal: Normal range of motion. She exhibits edema (facial edema, upper extremity edema b/l). She exhibits no tenderness or deformity.   Neurological: She is alert and oriented to person, place, and time. No cranial nerve deficit or sensory deficit.     4/5 shoulder strength on abduction; range of motion limited by weakness. Range of motion still limited by weakness but improved from exam prior. Around 30 degrees above shoulder plane bilaterally  5/5 extension of forearms strength against resistance   Skin: Skin is warm and dry. Capillary refill takes less than 2 seconds. No rash noted. She is not diaphoretic. No erythema. No pallor.   Psychiatric: She has a normal mood and affect.       Significant Labs:   CBC:   Recent Labs   Lab 01/27/19  0421 01/28/19  0400   WBC 11.21 10.95   HGB 9.7* 10.4*   HCT 30.7* 32.0*    210    and CMP:   Recent Labs   Lab 01/27/19  0421 01/28/19  0400    138   K 3.2* 3.4*    99   CO2 28 29   * 119*   BUN 12 15   CREATININE 0.6 0.6   CALCIUM 8.5* 8.9   PROT 5.9* 6.2   ALBUMIN 2.7* 2.9*   BILITOT 0.4 0.5   ALKPHOS 80 80   * 176*   ALT 63* 70*   ANIONGAP 9 10   EGFRNONAA >60.0 >60.0       Diagnostic Results:  I have reviewed and  interpreted all pertinent imaging results/findings within the past 24 hours.    Assessment/Plan:     * Myositis    - DDX dermatomyositis de haylie vs related to immunotherapy  - trend CPK daily; downtrending generally with a slight bump on 01/27  -restarted fluids 01/26 due to NPO  - myositis labs suspicious for myosits; rheum on board.   - F/u 1/24 skin biopsy. Pending results may need muscle biopsy by general surgery  -MRI left humerus suspicious for myositis  -per rheum recs, methylprednisolone 125 mg IV BID. Awaiting further recommendations pending biopsy results regarding steroid taper vs steroid-sparing immunomodulating agent     Hypokalemia    Replete prn     Dysphagia    Patient is experiencing difficulty swallowing that has been increasing in severity over last couple of days to the point where patient did not feel as if she could swallow.  As of 01/26, SLP evaluated patient and determined that she should be NPO except for medications until a modified barium swallow could be performed. Possibly candidal esophagitis; patient has oral thrush.    Plan:  - High aspiration risk on modified barium study, strict NPO  - Switched to IV meds where possible  - Fluconazole IVPB 200mg q24hr  -continue to appreciate SLP recs     Swelling of upper arm    -B/L U/S negative for DVTs  -appears less edematous than day prior  -continue to monitor     Dermatitis    - Skin biopsy from 1/24 pending, appreciate dermatology recs     Candidiasis    -cont Nystatin swish and swallow. Additionally, added fluconaozle 200 mg Po qday on 01/26 as possibility patient is experiencing esophagitis 2/2 to candida  -appears to be improving with fluconazole     Non-traumatic rhabdomyolysis    -see myositis. Continue supportive care, Cr/ electrolyte/ CPK monitoring and aggressive hydration. transaminitis on CMP is likely due to skeletal muscle rhabdo, not hepatic     Drug rash    - Unclear if symptoms related to chemotherapy     Pre-diabetes     -check a1c, 0-5u SSI while on steroids, add prandial insulin as warranted.      Adjustment disorder with depressed mood    -cont home SSRI     Vitamin B12 deficiency    -cont home B12 supplementation      Breast cancer of upper-outer quadrant of left female breast    -On January 24, 2017 a core needle biopsy was performed which showed infiltrating ductal carcinoma, high grade.  The tumor was ER negative, OR negative, and HER-2 negative.  -She had 4 cycles of  Wilbur-adjuvantTaxotere and Cytoxan completed  on 5/9/17.  -On June 26 she underwent left mastectomy.  That revealed 2 foci of invasive high-grade carcinoma measuring 14 mm and 1.5 mm.  Margins were negative.  -She completed 4 cycles of adjuvant Adriamycin on September 12, 2017.  -  A fine-needle aspirate of the lymph node was performed on October 19th.  That showed metastatic carcinoma consistent with breast primary which was ER negative, OR negative and HER 2-negative.    -Per rheumatology: hold paclitaxel agent and atezolizumb at this time - both can cause myositis      Hypertension    -on home amlodipine-benazepril; will hold ACEi for now and observe renal function with CPK elevation in rhabdo.     Plan:  - Amlodipine 10 mg qday on admission  - 1/28 Pt now strict NPO due to failed swallow study, now on hydralazine 10mg IV q8h WCTM and adjust PRN              Noah Patel MD  Hematology/Oncology  [facility]

## 2019-01-28 NOTE — PROGRESS NOTES
[facility]  Rheumatology  Progress Note    Patient Name: Ermelinda Verde  MRN: [id number]  Admission Date: 1/22/2019  Hospital Length of Stay: 6 days  Code Status: Full Code   Attending Provider: Dale Dubon MD  Primary Care Physician: Reta Weeks MD  Principal Problem: Myositis    Subjective:     HPI: 57yo F with history of L sided infiltrating ductal carcinoma of the L breast (dx on Jan 2017), HTN, depression, and gastritis was send from hem/onc clinic for evaluation of possible inflammatory myositis.     L breast cancer s/p completion of 4 cycles of demi-adjuvant taxotere and cytoxan (completed on 5/9/17) and mastectomy (6/26/17) and then 4 cycles of adjuvant Adriamycin (completed 9/12/17). In 10/2017 - patient developed L supraclavicular lymphadenopathy which FNA confirmed as reoccurrence of previously treated breast cancer.     Patient started on Atelizumab(anti PD-L1)/Abraxane on 11/7/18. Per chart review, patient noticed rashes on the dorsum of her hands on 11/11/18. Seen in urgent care and given topical steroid cream. Then received two more infusions on Atelizumab/Abraxane on 11/21/18 and 12/5/18. Started to notice puffiness around the eyes on 12/11/18. Received another Abraxane infusion on 12/12/18 but this time with hydrocortisone 50 mg which helped reduce the swelling around the eyes. Developed swelling of the face again on 12/15/18. Next infusion of Abraxane done on 12/19/18 with Solucortef and Atelizumab held. Abraxane given again on 1/3/19 with no IV steroid. Patient developed swelling of the face on 1/4/19 and went to urgent care and given short course of prednisone 20 mg BID. On 1/15/19, patient seen in hem/onc clinic with c/o of pressure and tightness around neck. CT scan of chest and neck did not reveal any vascular compression. Started on prednisone 60 mg with taper. On 1/22/18 patient with c/o proximal muscle weakness. CPK found to be elevated around 4k. Patient admitted and given  "solumedrol 80 mg IV on 1/22/18. Last infusion of Atelizumab on 12/19/18. Last infusion of Abraxane on 1/3/19. Given Solumedrol 1g x 1 on 1/23/18. Seen by Dermatology on 1/24/18 and biopsy done of skin rash. Given distribution of rashes, there was concern for dermatomyositis. Patient started on Solumedrol 125 mg IV BID at this time.     Denies any family history of autoimmune diseases.    No smoking, EtOH, recreational drug usage.    Denies any photosensitivity, joint swelling, unintentional weigh loss, abdominal pain, night sweats, CP, SOB.  +oral thrush.     Interval History: Patient still with difficulty swallowing due to thrush. Started on fluconazole on 1/26/19. Went for swallowing study today. Still feels that her upper arms are "heavy". Feels an improvement of 30-40% in strength since admission. No new rashes. No trouble breathing, f/c, n/v/d.      Current Facility-Administered Medications   Medication Frequency    acetaminophen tablet 650 mg Q4H PRN    ALPRAZolam tablet 0.25 mg TID PRN    amLODIPine tablet 10 mg Daily    benazepril tablet 40 mg Daily    calcium carbonate (OS-ESTELA) tablet 500 mg Daily    cetirizine tablet 10 mg Daily    cyanocobalamin tablet 500 mcg Daily    dextrose 50% injection 12.5 g PRN    dextrose 50% injection 25 g PRN    enoxaparin injection 40 mg Daily    escitalopram oxalate tablet 10 mg Daily    famotidine tablet 20 mg BID    fluconazole tablet 200 mg Daily    fluticasone 50 mcg/actuation nasal spray 50 mcg Daily    glucagon (human recombinant) injection 1 mg PRN    glucose chewable tablet 16 g PRN    glucose chewable tablet 24 g PRN    insulin aspart U-100 pen 0-5 Units QID (AC + HS) PRN    methylPREDNISolone sodium succinate injection 125 mg BID    nystatin 100,000 unit/mL suspension 500,000 Units QID    ondansetron disintegrating tablet 8 mg Q8H PRN    oxyCODONE immediate release tablet 5 mg Q6H PRN    polyethylene glycol packet 17 g BID PRN    potassium " chloride 10 mEq in 100 mL IVPB Q1H    promethazine tablet 12.5 mg Q6H PRN    sodium chloride 0.9% flush 5 mL PRN    vitamin D 1000 units tablet 1,000 Units Daily     Objective:     Vital Signs (Most Recent):  Temp: 98.5 °F (36.9 °C) (01/28/19 0800)  Pulse: 91 (01/28/19 0800)  Resp: 14 (01/28/19 0800)  BP: (!) 141/78 (01/28/19 0800)  SpO2: 99 % (01/28/19 0800)  O2 Device (Oxygen Therapy): room air (01/28/19 0312) Vital Signs (24h Range):  Temp:  [98.1 °F (36.7 °C)-98.7 °F (37.1 °C)] 98.1 °F (36.7 °C)  Pulse:  [] 89  Resp:  [14-18] 17  SpO2:  [94 %-100 %] 94 %  BP: (135-167)/(76-90) 140/82     Weight: 79.9 kg (176 lb 0.6 oz) (01/28/19 0400)  Body mass index is 30.34 kg/m².  Body surface area is 1.95 meters squared.      Intake/Output Summary (Last 24 hours) at 1/28/2019 1037  Last data filed at 1/28/2019 0800  Gross per 24 hour   Intake 0 ml   Output 900 ml   Net -900 ml       Physical Exam   Constitutional: She is oriented to person, place, and time and well-developed, well-nourished, and in no distress. No distress.   HENT:   Head: Normocephalic and atraumatic.   Right Ear: External ear normal.   Left Ear: External ear normal.   Bilateral orbital edema with heliotropic rash - resolved   Eyes: Conjunctivae and EOM are normal. Pupils are equal, round, and reactive to light.   Neck: Normal range of motion. Neck supple.   Cardiovascular: Normal rate, regular rhythm, normal heart sounds and intact distal pulses.    Pulmonary/Chest: Effort normal and breath sounds normal. No respiratory distress.   Abdominal: Soft. Bowel sounds are normal.   Neurological: She is alert and oriented to person, place, and time. GCS score is 15.   Skin: Skin is warm and dry. No rash noted.     Hyperpigmented non raised rash over nape of neck, elbows, knuckles (resemble Gottron's papules), thighs, and ankles.        Psychiatric: Mood, memory, affect and judgment normal.   Musculoskeletal: Normal range of motion. She exhibits edema.  She exhibits no tenderness or deformity.   No signs of synovitis. ROM intact   Inability to move bilateral UE above 45 degrees simultaneously.  Able to move a little past 120 on each one alone  3/5 strength of bilateral UE muscles (L side weaker than R)  4/5 of bilateral LE muscles  Bilateral UE edema with skin thickening         Significant Labs:  Recent Results (from the past 48 hour(s))   Comprehensive Metabolic Panel (CMP)    Collection Time: 01/27/19  4:21 AM   Result Value Ref Range    Sodium 138 136 - 145 mmol/L    Potassium 3.2 (L) 3.5 - 5.1 mmol/L    Chloride 101 95 - 110 mmol/L    CO2 28 23 - 29 mmol/L    Glucose 120 (H) 70 - 110 mg/dL    BUN, Bld 12 6 - 20 mg/dL    Creatinine 0.6 0.5 - 1.4 mg/dL    Calcium 8.5 (L) 8.7 - 10.5 mg/dL    Total Protein 5.9 (L) 6.0 - 8.4 g/dL    Albumin 2.7 (L) 3.5 - 5.2 g/dL    Total Bilirubin 0.4 0.1 - 1.0 mg/dL    Alkaline Phosphatase 80 55 - 135 U/L     (H) 10 - 40 U/L    ALT 63 (H) 10 - 44 U/L    Anion Gap 9 8 - 16 mmol/L    eGFR if African American >60.0 >60 mL/min/1.73 m^2    eGFR if non African American >60.0 >60 mL/min/1.73 m^2   Magnesium    Collection Time: 01/27/19  4:21 AM   Result Value Ref Range    Magnesium 2.0 1.6 - 2.6 mg/dL   Phosphorus    Collection Time: 01/27/19  4:21 AM   Result Value Ref Range    Phosphorus 2.9 2.7 - 4.5 mg/dL   CK    Collection Time: 01/27/19  4:21 AM   Result Value Ref Range    CPK 2076 (H) 20 - 180 U/L   CBC auto differential    Collection Time: 01/27/19  4:21 AM   Result Value Ref Range    WBC 11.21 3.90 - 12.70 K/uL    RBC 3.73 (L) 4.00 - 5.40 M/uL    Hemoglobin 9.7 (L) 12.0 - 16.0 g/dL    Hematocrit 30.7 (L) 37.0 - 48.5 %    MCV 82 82 - 98 fL    MCH 26.0 (L) 27.0 - 31.0 pg    MCHC 31.6 (L) 32.0 - 36.0 g/dL    RDW 17.1 (H) 11.5 - 14.5 %    Platelets 201 150 - 350 K/uL    MPV 9.8 9.2 - 12.9 fL    Immature Granulocytes 1.3 (H) 0.0 - 0.5 %    Gran # (ANC) 9.4 (H) 1.8 - 7.7 K/uL    Immature Grans (Abs) 0.15 (H) 0.00 - 0.04 K/uL     Lymph # 1.0 1.0 - 4.8 K/uL    Mono # 0.7 0.3 - 1.0 K/uL    Eos # 0.0 0.0 - 0.5 K/uL    Baso # 0.01 0.00 - 0.20 K/uL    nRBC 0 0 /100 WBC    Gran% 83.8 (H) 38.0 - 73.0 %    Lymph% 8.5 (L) 18.0 - 48.0 %    Mono% 6.3 4.0 - 15.0 %    Eosinophil% 0.0 0.0 - 8.0 %    Basophil% 0.1 0.0 - 1.9 %    Differential Method Automated    Aldolase    Collection Time: 01/27/19  4:21 AM   Result Value Ref Range    Aldolase 13.1 (H) 1.2 - 7.6 U/L   Comprehensive Metabolic Panel (CMP)    Collection Time: 01/28/19  4:00 AM   Result Value Ref Range    Sodium 138 136 - 145 mmol/L    Potassium 3.4 (L) 3.5 - 5.1 mmol/L    Chloride 99 95 - 110 mmol/L    CO2 29 23 - 29 mmol/L    Glucose 119 (H) 70 - 110 mg/dL    BUN, Bld 15 6 - 20 mg/dL    Creatinine 0.6 0.5 - 1.4 mg/dL    Calcium 8.9 8.7 - 10.5 mg/dL    Total Protein 6.2 6.0 - 8.4 g/dL    Albumin 2.9 (L) 3.5 - 5.2 g/dL    Total Bilirubin 0.5 0.1 - 1.0 mg/dL    Alkaline Phosphatase 80 55 - 135 U/L     (H) 10 - 40 U/L    ALT 70 (H) 10 - 44 U/L    Anion Gap 10 8 - 16 mmol/L    eGFR if African American >60.0 >60 mL/min/1.73 m^2    eGFR if non African American >60.0 >60 mL/min/1.73 m^2   Magnesium    Collection Time: 01/28/19  4:00 AM   Result Value Ref Range    Magnesium 2.1 1.6 - 2.6 mg/dL   Phosphorus    Collection Time: 01/28/19  4:00 AM   Result Value Ref Range    Phosphorus 3.2 2.7 - 4.5 mg/dL   CK    Collection Time: 01/28/19  4:00 AM   Result Value Ref Range    CPK 2249 (H) 20 - 180 U/L   CBC auto differential    Collection Time: 01/28/19  4:00 AM   Result Value Ref Range    WBC 10.95 3.90 - 12.70 K/uL    RBC 3.78 (L) 4.00 - 5.40 M/uL    Hemoglobin 10.4 (L) 12.0 - 16.0 g/dL    Hematocrit 32.0 (L) 37.0 - 48.5 %    MCV 85 82 - 98 fL    MCH 27.5 27.0 - 31.0 pg    MCHC 32.5 32.0 - 36.0 g/dL    RDW 17.2 (H) 11.5 - 14.5 %    Platelets 210 150 - 350 K/uL    MPV 9.5 9.2 - 12.9 fL    Immature Granulocytes 0.9 (H) 0.0 - 0.5 %    Gran # (ANC) 9.4 (H) 1.8 - 7.7 K/uL    Immature Grans (Abs)  0.10 (H) 0.00 - 0.04 K/uL    Lymph # 0.8 (L) 1.0 - 4.8 K/uL    Mono # 0.6 0.3 - 1.0 K/uL    Eos # 0.0 0.0 - 0.5 K/uL    Baso # 0.01 0.00 - 0.20 K/uL    nRBC 0 0 /100 WBC    Gran% 85.5 (H) 38.0 - 73.0 %    Lymph% 7.7 (L) 18.0 - 48.0 %    Mono% 5.8 4.0 - 15.0 %    Eosinophil% 0.0 0.0 - 8.0 %    Basophil% 0.1 0.0 - 1.9 %    Differential Method Automated    Aldolase    Collection Time: 01/28/19  4:00 AM   Result Value Ref Range    Aldolase 15.4 (H) 1.2 - 7.6 U/L     Results for ROMEO PEREZ (MRN [id number]) as of 1/28/2019 10:23   Ref. Range 1/22/2019 11:19 1/22/2019 22:24 1/23/2019 03:00 1/24/2019 03:40 1/25/2019 04:20 1/26/2019 04:00 1/27/2019 04:21 1/28/2019 04:00   CPK Latest Ref Range: 20 - 180 U/L 4562 (H) 4319 (H) 3831 (H) 2747 (H) 2224 (H) 1947 (H) 2076 (H) 2249 (H)   Results for ROMEO PEREZ (MRN [id number]) as of 1/28/2019 10:23   Ref. Range 1/24/2019 03:40 1/25/2019 04:20 1/26/2019 04:00 1/27/2019 04:21 1/28/2019 04:00   Aldolase Latest Ref Range: 1.2 - 7.6 U/L 12.7 (H) 14.2 (H) 13.7 (H) 13.1 (H) 15.4 (H)   Results for ROMEO PEREZ (MRN [id number]) as of 1/28/2019 10:23   Ref. Range 1/22/2019 22:24   Sed Rate Latest Ref Range: 0 - 36 mm/Hr 50 (H)     Results for ROMEO PEREZ (MRN [id number]) as of 1/28/2019 10:23   Ref. Range 1/22/2019 22:24   CRP Latest Ref Range: 0.0 - 8.2 mg/L 31.1 (H)      Results for ROMEO PEREZ (MRN [id number]) as of 1/28/2019 10:23   Ref. Range 1/22/2019 22:24 1/25/2019 17:33   DARCY HEP-2 Titer Unknown Positive 1:640 Sp...    Anti-SSA Antibody Latest Ref Range: 0.00 - 19.99 EU 0.49    Anti-SSA Interpretation Latest Ref Range: Negative  Negative    Anti-SSB Antibody Latest Ref Range: 0.00 - 19.99 EU 0.11    Anti-SSB Interpretation Latest Ref Range: Negative  Negative    ds DNA Ab Latest Ref Range: Negative 1:10  Negative 1:10    Anti Sm Antibody Latest Ref Range: 0.00 - 19.99 EU 0.58    Anti-Sm Interpretation Latest Ref Range: Negative  Negative    Anti Sm/RNP Antibody  Latest Ref Range: 0.00 - 19.99 EU 0.62    Anti-Sm/RNP Interpretation Latest Ref Range: Negative  Negative    DARCY Screen Latest Ref Range: Negative <1:160  Positive (A)    Complement (C-3) Latest Ref Range: 50 - 180 mg/dL  106   Complement (C-4) Latest Ref Range: 11 - 44 mg/dL  29   Meg-1 Autoantibodies Latest Ref Range: <1.0 Index <1.00    Results for RMOEO PEREZ (MRN [id number]) as of 1/28/2019 10:23   Ref. Range 1/23/2019 02:55 1/23/2019 02:56   Specimen UA Unknown Urine, Clean Catch    Color, UA Latest Ref Range: Yellow, Straw, [place]  Colorless (A)    pH, UA Latest Ref Range: 5.0 - 8.0  6.0    Specific Gravity, UA Latest Ref Range: 1.005 - 1.030  1.005    Appearance, UA Latest Ref Range: Clear  Clear    Protein, UA Latest Ref Range: Negative  Negative    Glucose, UA Latest Ref Range: Negative  Negative    Ketones, UA Latest Ref Range: Negative  Negative    Occult Blood UA Latest Ref Range: Negative  Negative    Nitrite, UA Latest Ref Range: Negative  Negative    Bilirubin (UA) Latest Ref Range: Negative  Negative    Leukocytes, UA Latest Ref Range: Negative  Trace (A)    RBC, UA Latest Ref Range: 0 - 4 /hpf  2   WBC, UA Latest Ref Range: 0 - 5 /hpf  3   Squam Epithel, UA Latest Units: /hpf  0   Microscopic Comment Unknown  SEE COMMENT     Significant Imaging:  MRI Humerus w/ and w/o contrast:  Impression       1. Diffuse edema and enhancement of the visualized left upper extremity musculature, most pronounced proximally, most notably of the deltoid and biceps musculature as detailed above.  Findings are nonspecific although could relate to a nonspecific myelitis particularly in light of patient's reported history.  Clinical correlation with appropriate history and lab markers advised.  2. No evidence of abscess or osteomyelitis.  This report was flagged in Epic as abnormal.     NM PET CT 12/27/18:  Impression       See above    Significant improvement in all the previously seen lymph nodes involving the left  lower neck, supraclavicular region, axillary and retropectoral region.    Index left retropectoral SUV max 3.57, previously 27.2.     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       Assessment/Plan:     * Myositis    59yo F with history of L sided infiltrating ductal carcinoma of the L breast (dx on Jan 2017), HTN, depression, and gastritis was send from hem/onc clinic for evaluation of possible inflammatory myositis.     Patient started on Atelizumab/Abraxane on 11/7/18. Per chart review, patient noticed rashes on the dorsum of her hands on 11/11/18. Seen in urgent care and given topical steroid cream. Then received two more infusions on Atelizumab/Abraxane on 11/21/18 and 12/5/18. Started to notice puffiness around the eyes on 12/11/18. Received another Abraxane infusion on 12/12/18 but this time with hydrocortisone 50 mg which helped reduce the swelling around the eyes. Developed swelling of the face again on 12/15/18. Next infusion of Abraxane done on 12/19/18 with Solucortef and Atelizumab held. Abraxane given again on 1/3/19 with no IV steroid. Patient developed swelling of the face on 1/4/19 and went to urgent care and given short course of prednisone 20 mg BID. On 1/15/19, patient seen in hem/onc clinic with c/o of pressure and tightness around neck. CT scan of chest and neck did not reveal any vascular compression. Started on prednisone 60 mg with taper. On 1/22/18 patient with c/o proximal muscle weakness. CPK found to be elevated around 4k. Patient admitted and given solumedrol 80 mg IV on 1/22/18. Last infusion of Atelizumab on 12/19/18. Last infusion of Abraxane on 1/3/19. Given Solumedrol 1g x 1 on 1/23/18. Seen by Dermatology on 1/24/18 and biopsy done of skin rash. Given distribution of rashes, there was concern for dermatomyositis. Patient started on Solumedrol 125 mg IV BID at this time.      Labs: ESR 50, CRP 31.1, CPK 4562 (trending downward 2224),  "Aldolase 13.6.  , ALT 64.  UA normal.  CBC unremarkable.  +DARCY 1:640 speckled with negative profile.  Complements normal.      Case reports of docetaxel related inflammatory myositis had been documented. "...taxanes have been noted to cause disabling but transient arthralgia and myalgias; it is important to consider the possibility of inflammatory myopathy as a possible complication in patients undergoing treatment with these agents." - A case of docetaxel induced myositis and review of literature. Austin et al 2015.    Case reports in Rheumatology   Case reports of atezolizumb (immunomodulator- antibody to PDL1) cause of myositis  - Lucy et al 2017 "Myositis as an adverse even of immune checkpoint blockade for cancer therapy.  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   Dermatomyositis can be associated with malignancy.  MRI of LUE showed edema of the deltoid and biceps.  Exam with proximal muscle weakness: b/l deltoids 4/5, b/l iliopsoas 4.4/5. 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     Inpatient treatments so far:  - Solumedrol 80 mg IV x 1 on 1/22/19  - Solumedrol 1 g IV x 1 on 1/23/19.  - Solumedrol 125 mg IV BID since 1/24/19.    Plan:  - Hold paclitaxel agent and atezolizumb at this time as both can cause myositis as AE  - Recommend GI evaluation to help differentiate between esophageal candidiasis and dysphagia secondary to dermatomyositis.  - continue to monitor CPK, aldolase, AST/ALT  - Decrease Solumderol to 100mg IV q12h. Would like to start on IVIg 400 mg/kg daily x 5 days and MTX 15 mg sq as part of treatment. Will first need to check labs listed below. Per Dr. Reyna (Oncologist) " okay to proceed with both IVIG and MTX.  - check quantitative immunoglobulins  - check GGT   - check pre-DMARDs and recommend ID fast track consult for vaccinations.  - check PFTs (spirometry and lung volumes)   - start physical therapy.   - f/u myomarker panel and HMGCR   - f/u with Dr. Swift and Dr. Campos in Rheumatology at discharge.               Wilner Reynolds MD  Rheumatology  [facility]    Pt interviewed and examined with Dr. Reynolds. Developed periorbital and facial swelling, rash dorsal hands. Received HC 50mg IV 12/12/18 on day she received Abraxane Improved, then recurrent 3 days later, and when due for atelizumab(PD L1 antibody) stopped and not restarted, the received another Abraxane and HC 100mg IV 12/19/18 improved again. Then 1/3/19, abraxane without steroids, recurrent facial swelling, Urgent Care prednisone 20mg twice daily x 2days, improved.Then 1/15/19 Heme-Onc tightness in neck and chest, CT neck and chest 1/15/19 no vascular compromise, normal sized cervical and axillary nodes. Right PICC, hepatic cysts. Then prednisone 60mg daily x2, then 40mg daily x2 , then 20mg daily. Seen in Heme-Onc 1/22/19 pt c/o weakness in arms, CK checked 4562. Admitted 1/22/19 rx with Solu-Medrol 80mg IV x 1 d, then 1000mg IV 1/23/19 and then Solu-Medrol 125mg q 12h 1/24/19.  Pt c/o thrush despite fluconazole and still feels weak but is able to raise arms which she had been unable to do. + dysphagia, + odynophagia. 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 Dermatomyositis associated with her underlying malignancy(breast cancer) skin biopsy + dermatomyositis +MRI    Myomarker 3, HMGCR pending  ggt to check liver  Quantitative immunoglobulins   Follow ck, aldolase, ast, alt  Needs pre-DMARD labs, vaccination update by ID before starting methotrexate( has no objection).  PFTs( spirometry  and Lung volumes)  decrease Solu-Medrol 100 mg IV q 12h  Cont fluconazole, mycostatin s & s  IVIg 400mg/kg daily x 5 days after quant immunoglobulins  Methotrexate 15mg sc once a week after pre-DMARD labs and vaccination update  Gastro for EGD to r/o candida esophagitis  PT

## 2019-01-28 NOTE — PT/OT/SLP PROGRESS
"Physical Therapy Treatment    Patient Name:  Ermelinda Verde   MRN:  [id number]    Recommendations:     Discharge Recommendations:  (HHPT)   Discharge Equipment Recommendations: walker, rolling   Barriers to discharge: None    Assessment:     Ermelinda Verde is a 58 y.o. female admitted with a medical diagnosis of Myositis.  She presents with the following impairments/functional limitations:  weakness, impaired endurance, gait instability, impaired balance, decreased lower extremity function, decreased safety awareness, impaired cardiopulmonary response to activity.  Pt able to complete 6MWT, though requires multiple standing rest breaks sec to impaired aerobic capacity and LE mm weakness.  HEP established.      Rehab Prognosis: Good; patient would benefit from acute skilled PT services to address these deficits and reach maximum level of function.    Recent Surgery: * No surgery found *      Plan:     During this hospitalization, patient to be seen 3 x/week to address the identified rehab impairments via gait training, therapeutic activities, therapeutic exercises, neuromuscular re-education and progress toward the following goals:    · Plan of Care Expires:  02/22/19    Subjective     Chief Complaint: Generalized weakness  Patient/Family Comments/goals: To improve ability to amb  Pain/Comfort:  · Pain Rating 1: 0/10      Objective:     Communicated with nursing prior to session.  Patient found all lines intact and call button in reach (port)  upon PT entry to room.     "I'm just weak because I haven't been eating."    General Precautions: Standard, aspiration, NPO, fall   Orthopedic Precautions:N/A   Braces: N/A     Functional Mobility:  · Bed Mobility:     · Scooting: independence  · Supine to Sit: independence  · Transfers:     · Sit to Stand:  independence with no AD  · Bed to Chair: supervision with  rolling walker  using  Stand Pivot  · Gait: Pt amb >500', with S-CGA, requires CGA sec to decreased stability " towards end of gait training approx last 160', pt required multiple standing rest breaks  · Balance: S-CGA: dynamic standing balance with AD      AM-PAC 6 CLICK MOBILITY  Turning over in bed (including adjusting bedclothes, sheets and blankets)?: 4  Sitting down on and standing up from a chair with arms (e.g., wheelchair, bedside commode, etc.): 4  Moving from lying on back to sitting on the side of the bed?: 4  Moving to and from a bed to a chair (including a wheelchair)?: 3  Need to walk in hospital room?: 3  Climbing 3-5 steps with a railing?: 3  Basic Mobility Total Score: 21       Therapeutic Activities and Exercises:   HEP established:  Ankle pumps: 20 reps, in sit   LAQs: 20 reps each LE perf individually, in sit  Hip flex: 10 reps x 2 sets, each LE perf individually, in sit, with verbalized difficulty  Sit-ups: 20 reps, in reclined sitting posture  6MWT: Pt amb 480' = 146.30m, S-CGA, RW, 5/10 RPE following    Patient left up in chair with all lines intact, call button in reach and nursing notified.    GOALS:   Multidisciplinary Problems     Physical Therapy Goals        Problem: Physical Therapy Goal    Goal Priority Disciplines Outcome Goal Variances Interventions   Physical Therapy Goal     PT, PT/OT Ongoing (interventions implemented as appropriate)     Description:  Goals to be met by: 19     Patient will increase functional independence with mobility by performin. Gait  x 500 feet with Supervision without device. - GOAL MET    2. Increased functional strength to 5/5 for BLE.  3. Standing lower extremity exercise program x 15 reps per handout, with supervision.  4. Pt to perf 6MWT: amb 580', to demonstrate a clinically significant improvement in aerobic capacity.                      Time Tracking:     PT Received On: 19  PT Start Time: 944     PT Stop Time: 1008  PT Total Time (min): 24 min     Billable Minutes: Gait Training 11 and Therapeutic Exercise 12    Treatment Type:  Treatment  PT/PTA: PT           Lotus Camacho, PT  01/28/2019

## 2019-01-28 NOTE — ASSESSMENT & PLAN NOTE
-on home amlodipine-benazepril; will hold ACEi for now and observe renal function with CPK elevation in rhabdo.     Plan:  - Amlodipine 10 mg qday on admission  - 1/28 Pt now strict NPO due to failed swallow study, now on hydralazine 10mg IV q8h WCTM and adjust PRN

## 2019-01-28 NOTE — ASSESSMENT & PLAN NOTE
-On January 24, 2017 a core needle biopsy was performed which showed infiltrating ductal carcinoma, high grade.  The tumor was ER negative, MN negative, and HER-2 negative.  -She had 4 cycles of  Wilbur-adjuvantTaxotere and Cytoxan completed  on 5/9/17.  -On June 26 she underwent left mastectomy.  That revealed 2 foci of invasive high-grade carcinoma measuring 14 mm and 1.5 mm.  Margins were negative.  -She completed 4 cycles of adjuvant Adriamycin on September 12, 2017.  -  A fine-needle aspirate of the lymph node was performed on October 19th.  That showed metastatic carcinoma consistent with breast primary which was ER negative, MN negative and HER 2-negative.    -Per rheumatology: hold paclitaxel agent and atezolizumb at this time - both can cause myositis

## 2019-01-28 NOTE — PLAN OF CARE
MDRs completed with Dr. Reyna and the team. Patient skin biopsy still pending. Rheumatology continues to follow. NAEON. VSS. Afebrile. Patient remains on fluconazole tablet 200 mg daily, oral nystatin QID, and Solu-medrol  mg BID. Modified barium swallow study completed; recs for strict NPO for aspiration while swallowing. MD to change meds to IV and consult nutrition for tube feed recs. Plans for NGT placement today for nutrition and medication administration. MD discussed plans with patient during rounds. CM to continue to follow with the team.    Nicole Carter, RN, BSN, CM  [facility]  Nurse - Med Onc/Gyn Onc  [phone]

## 2019-01-28 NOTE — ASSESSMENT & PLAN NOTE
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   - F/u 1/24 skin biopsy. Pending results may need muscle biopsy by general surgery  -MRI left humerus suspicious for myositis  -per rheum recs, methylprednisolone 125 mg IV BID. Awaiting further recommendations pending biopsy results regarding steroid taper vs steroid-sparing immunomodulating agent

## 2019-01-29 ENCOUNTER — ANESTHESIA EVENT (OUTPATIENT)
Dept: ENDOSCOPY | Facility: HOSPITAL | Age: 59
DRG: 598 | End: 2019-01-29
Payer: COMMERCIAL

## 2019-01-29 ENCOUNTER — ANESTHESIA (OUTPATIENT)
Dept: ENDOSCOPY | Facility: HOSPITAL | Age: 59
DRG: 598 | End: 2019-01-29
Payer: COMMERCIAL

## 2019-01-29 PROBLEM — M33.13 DERMATOMYOSITIS: Status: ACTIVE | Noted: 2019-01-29

## 2019-01-29 PROBLEM — M33.90 DERMATOMYOSITIS: Status: ACTIVE | Noted: 2019-01-29

## 2019-01-29 LAB
ALBUMIN SERPL BCP-MCNC: 3 G/DL
ALP SERPL-CCNC: 76 U/L
ALT SERPL W/O P-5'-P-CCNC: 64 U/L
ANION GAP SERPL CALC-SCNC: 11 MMOL/L
AST SERPL-CCNC: 160 U/L
BASOPHILS # BLD AUTO: 0.01 K/UL
BASOPHILS NFR BLD: 0.1 %
BILIRUB SERPL-MCNC: 0.5 MG/DL
BUN SERPL-MCNC: 19 MG/DL
CALCIUM SERPL-MCNC: 9.1 MG/DL
CHLORIDE SERPL-SCNC: 99 MMOL/L
CK SERPL-CCNC: 1789 U/L
CO2 SERPL-SCNC: 26 MMOL/L
CREAT SERPL-MCNC: 0.6 MG/DL
DIFFERENTIAL METHOD: ABNORMAL
EOSINOPHIL # BLD AUTO: 0 K/UL
EOSINOPHIL NFR BLD: 0 %
ERYTHROCYTE [DISTWIDTH] IN BLOOD BY AUTOMATED COUNT: 17.7 %
EST. GFR  (AFRICAN AMERICAN): >60 ML/MIN/1.73 M^2
EST. GFR  (NON AFRICAN AMERICAN): >60 ML/MIN/1.73 M^2
GLUCOSE SERPL-MCNC: 115 MG/DL
HBV CORE AB SERPL QL IA: NEGATIVE
HBV SURFACE AB SER-ACNC: NEGATIVE M[IU]/ML
HBV SURFACE AG SERPL QL IA: NEGATIVE
HCT VFR BLD AUTO: 31.6 %
HCV AB SERPL QL IA: NEGATIVE
HEPATITIS A ANTIBODY, IGG: POSITIVE
HGB BLD-MCNC: 10.3 G/DL
HIV 1+2 AB+HIV1 P24 AG SERPL QL IA: NEGATIVE
IMM GRANULOCYTES # BLD AUTO: 0.11 K/UL
IMM GRANULOCYTES NFR BLD AUTO: 1 %
LYMPHOCYTES # BLD AUTO: 0.8 K/UL
LYMPHOCYTES NFR BLD: 7.2 %
MAGNESIUM SERPL-MCNC: 2.5 MG/DL
MCH RBC QN AUTO: 27 PG
MCHC RBC AUTO-ENTMCNC: 32.6 G/DL
MCV RBC AUTO: 83 FL
MONOCYTES # BLD AUTO: 0.7 K/UL
MONOCYTES NFR BLD: 6.6 %
NEUTROPHILS # BLD AUTO: 9.4 K/UL
NEUTROPHILS NFR BLD: 85.1 %
NRBC BLD-RTO: 0 /100 WBC
PHOSPHATE SERPL-MCNC: 3.3 MG/DL
PLATELET # BLD AUTO: 192 K/UL
PMV BLD AUTO: 9.8 FL
POTASSIUM SERPL-SCNC: 3.6 MMOL/L
PRE FEV1 FVC: 78
PRE FEV1: 1.99
PRE FVC: 2.54
PREDICTED FEV1 FVC: 80
PREDICTED FEV1: 2.52
PREDICTED FVC: 3.15
PROT SERPL-MCNC: 6.3 G/DL
RBC # BLD AUTO: 3.81 M/UL
RPR SER QL: NORMAL
SODIUM SERPL-SCNC: 136 MMOL/L
WBC # BLD AUTO: 11 K/UL

## 2019-01-29 PROCEDURE — 80053 COMPREHEN METABOLIC PANEL: CPT

## 2019-01-29 PROCEDURE — 27201012 HC FORCEPS, HOT/COLD, DISP: Performed by: INTERNAL MEDICINE

## 2019-01-29 PROCEDURE — 25000003 PHARM REV CODE 250: Performed by: NURSE ANESTHETIST, CERTIFIED REGISTERED

## 2019-01-29 PROCEDURE — 82550 ASSAY OF CK (CPK): CPT

## 2019-01-29 PROCEDURE — 88305 TISSUE SPECIMEN TO PATHOLOGY - SURGERY: ICD-10-PCS | Mod: 26,,, | Performed by: PATHOLOGY

## 2019-01-29 PROCEDURE — 94010 BREATHING CAPACITY TEST: CPT | Performed by: INTERNAL MEDICINE

## 2019-01-29 PROCEDURE — 43239 EGD BIOPSY SINGLE/MULTIPLE: CPT | Mod: ,,, | Performed by: INTERNAL MEDICINE

## 2019-01-29 PROCEDURE — 99232 PR SUBSEQUENT HOSPITAL CARE,LEVL II: ICD-10-PCS | Mod: ,,, | Performed by: INTERNAL MEDICINE

## 2019-01-29 PROCEDURE — 99254 PR INITIAL INPATIENT CONSULT,LEVL IV: ICD-10-PCS | Mod: 25,,, | Performed by: INTERNAL MEDICINE

## 2019-01-29 PROCEDURE — 99232 SBSQ HOSP IP/OBS MODERATE 35: CPT | Mod: ,,, | Performed by: INTERNAL MEDICINE

## 2019-01-29 PROCEDURE — 25000003 PHARM REV CODE 250: Performed by: INTERNAL MEDICINE

## 2019-01-29 PROCEDURE — 36415 COLL VENOUS BLD VENIPUNCTURE: CPT

## 2019-01-29 PROCEDURE — 82085 ASSAY OF ALDOLASE: CPT

## 2019-01-29 PROCEDURE — 63600175 PHARM REV CODE 636 W HCPCS: Performed by: STUDENT IN AN ORGANIZED HEALTH CARE EDUCATION/TRAINING PROGRAM

## 2019-01-29 PROCEDURE — 63600175 PHARM REV CODE 636 W HCPCS: Mod: JG | Performed by: INTERNAL MEDICINE

## 2019-01-29 PROCEDURE — 37000009 HC ANESTHESIA EA ADD 15 MINS: Performed by: INTERNAL MEDICINE

## 2019-01-29 PROCEDURE — D9220A PRA ANESTHESIA: Mod: CRNA,,, | Performed by: NURSE ANESTHETIST, CERTIFIED REGISTERED

## 2019-01-29 PROCEDURE — 83516 IMMUNOASSAY NONANTIBODY: CPT | Mod: 59

## 2019-01-29 PROCEDURE — 99233 SBSQ HOSP IP/OBS HIGH 50: CPT | Mod: ,,, | Performed by: INTERNAL MEDICINE

## 2019-01-29 PROCEDURE — 63600175 PHARM REV CODE 636 W HCPCS: Performed by: INTERNAL MEDICINE

## 2019-01-29 PROCEDURE — 83520 IMMUNOASSAY QUANT NOS NONAB: CPT

## 2019-01-29 PROCEDURE — 37000008 HC ANESTHESIA 1ST 15 MINUTES: Performed by: INTERNAL MEDICINE

## 2019-01-29 PROCEDURE — 99254 IP/OBS CNSLTJ NEW/EST MOD 60: CPT | Mod: 25,,, | Performed by: INTERNAL MEDICINE

## 2019-01-29 PROCEDURE — 84100 ASSAY OF PHOSPHORUS: CPT

## 2019-01-29 PROCEDURE — 92507 TX SP LANG VOICE COMM INDIV: CPT

## 2019-01-29 PROCEDURE — 25000003 PHARM REV CODE 250: Performed by: STUDENT IN AN ORGANIZED HEALTH CARE EDUCATION/TRAINING PROGRAM

## 2019-01-29 PROCEDURE — 97110 THERAPEUTIC EXERCISES: CPT

## 2019-01-29 PROCEDURE — 43239 PR EGD, FLEX, W/BIOPSY, SGL/MULTI: ICD-10-PCS | Mod: ,,, | Performed by: INTERNAL MEDICINE

## 2019-01-29 PROCEDURE — D9220A PRA ANESTHESIA: ICD-10-PCS | Mod: ANES,,, | Performed by: ANESTHESIOLOGY

## 2019-01-29 PROCEDURE — 86235 NUCLEAR ANTIGEN ANTIBODY: CPT

## 2019-01-29 PROCEDURE — 97116 GAIT TRAINING THERAPY: CPT

## 2019-01-29 PROCEDURE — 94729 DIFFUSING CAPACITY: CPT | Performed by: INTERNAL MEDICINE

## 2019-01-29 PROCEDURE — 85025 COMPLETE CBC W/AUTO DIFF WBC: CPT

## 2019-01-29 PROCEDURE — S0028 INJECTION, FAMOTIDINE, 20 MG: HCPCS | Performed by: INTERNAL MEDICINE

## 2019-01-29 PROCEDURE — 63600175 PHARM REV CODE 636 W HCPCS: Performed by: NURSE ANESTHETIST, CERTIFIED REGISTERED

## 2019-01-29 PROCEDURE — 99233 PR SUBSEQUENT HOSPITAL CARE,LEVL III: ICD-10-PCS | Mod: ,,, | Performed by: INTERNAL MEDICINE

## 2019-01-29 PROCEDURE — 88305 TISSUE EXAM BY PATHOLOGIST: CPT | Performed by: PATHOLOGY

## 2019-01-29 PROCEDURE — 20600001 HC STEP DOWN PRIVATE ROOM

## 2019-01-29 PROCEDURE — D9220A PRA ANESTHESIA: Mod: ANES,,, | Performed by: ANESTHESIOLOGY

## 2019-01-29 PROCEDURE — 83735 ASSAY OF MAGNESIUM: CPT

## 2019-01-29 PROCEDURE — 43239 EGD BIOPSY SINGLE/MULTIPLE: CPT | Performed by: INTERNAL MEDICINE

## 2019-01-29 PROCEDURE — S0028 INJECTION, FAMOTIDINE, 20 MG: HCPCS | Performed by: STUDENT IN AN ORGANIZED HEALTH CARE EDUCATION/TRAINING PROGRAM

## 2019-01-29 PROCEDURE — D9220A PRA ANESTHESIA: ICD-10-PCS | Mod: CRNA,,, | Performed by: NURSE ANESTHETIST, CERTIFIED REGISTERED

## 2019-01-29 RX ORDER — FOLIC ACID 1 MG/1
1 TABLET ORAL DAILY
Status: DISCONTINUED | OUTPATIENT
Start: 2019-01-30 | End: 2019-02-08

## 2019-01-29 RX ORDER — FAMOTIDINE 10 MG/ML
20 INJECTION INTRAVENOUS
Status: DISCONTINUED | OUTPATIENT
Start: 2019-01-29 | End: 2019-01-29

## 2019-01-29 RX ORDER — METHYLPREDNISOLONE SOD SUCC 125 MG
75 VIAL (EA) INJECTION EVERY 12 HOURS
Status: DISCONTINUED | OUTPATIENT
Start: 2019-01-29 | End: 2019-01-30

## 2019-01-29 RX ORDER — PANTOPRAZOLE SODIUM 40 MG/10ML
40 INJECTION, POWDER, LYOPHILIZED, FOR SOLUTION INTRAVENOUS DAILY
Status: DISCONTINUED | OUTPATIENT
Start: 2019-01-30 | End: 2019-01-30

## 2019-01-29 RX ORDER — PROPOFOL 10 MG/ML
VIAL (ML) INTRAVENOUS CONTINUOUS PRN
Status: DISCONTINUED | OUTPATIENT
Start: 2019-01-29 | End: 2019-01-29

## 2019-01-29 RX ORDER — MEPERIDINE HYDROCHLORIDE 25 MG/ML
12.5 INJECTION INTRAMUSCULAR; INTRAVENOUS; SUBCUTANEOUS ONCE AS NEEDED
Status: DISCONTINUED | OUTPATIENT
Start: 2019-01-29 | End: 2019-01-29 | Stop reason: HOSPADM

## 2019-01-29 RX ORDER — ACETAMINOPHEN 325 MG/1
650 TABLET ORAL
Status: CANCELLED | OUTPATIENT
Start: 2019-01-29

## 2019-01-29 RX ORDER — METHOTREXATE 25 MG/ML
15 INJECTION, SOLUTION INTRA-ARTERIAL; INTRAMUSCULAR; INTRAVENOUS ONCE
Status: COMPLETED | OUTPATIENT
Start: 2019-01-29 | End: 2019-01-29

## 2019-01-29 RX ORDER — FLUCONAZOLE 200 MG/1
200 TABLET ORAL
Status: COMPLETED | OUTPATIENT
Start: 2019-01-29 | End: 2019-02-01

## 2019-01-29 RX ORDER — PANTOPRAZOLE SODIUM 40 MG/1
40 TABLET, DELAYED RELEASE ORAL
Status: DISCONTINUED | OUTPATIENT
Start: 2019-01-29 | End: 2019-01-29

## 2019-01-29 RX ORDER — SODIUM CHLORIDE 9 MG/ML
INJECTION, SOLUTION INTRAVENOUS CONTINUOUS
Status: DISCONTINUED | OUTPATIENT
Start: 2019-01-29 | End: 2019-01-29

## 2019-01-29 RX ORDER — ACETAMINOPHEN 325 MG/1
650 TABLET ORAL
Status: COMPLETED | OUTPATIENT
Start: 2019-01-29 | End: 2019-01-29

## 2019-01-29 RX ORDER — HEPARIN 100 UNIT/ML
500 SYRINGE INTRAVENOUS
Status: CANCELLED | OUTPATIENT
Start: 2019-01-29

## 2019-01-29 RX ORDER — SODIUM CHLORIDE 9 MG/ML
INJECTION, SOLUTION INTRAVENOUS CONTINUOUS PRN
Status: DISCONTINUED | OUTPATIENT
Start: 2019-01-29 | End: 2019-01-29

## 2019-01-29 RX ORDER — HYDROMORPHONE HYDROCHLORIDE 1 MG/ML
0.2 INJECTION, SOLUTION INTRAMUSCULAR; INTRAVENOUS; SUBCUTANEOUS EVERY 5 MIN PRN
Status: DISCONTINUED | OUTPATIENT
Start: 2019-01-29 | End: 2019-01-29 | Stop reason: HOSPADM

## 2019-01-29 RX ORDER — HYDRALAZINE HYDROCHLORIDE 20 MG/ML
10 INJECTION INTRAMUSCULAR; INTRAVENOUS EVERY 6 HOURS PRN
Status: DISCONTINUED | OUTPATIENT
Start: 2019-01-29 | End: 2019-02-13 | Stop reason: HOSPADM

## 2019-01-29 RX ORDER — FAMOTIDINE 10 MG/ML
20 INJECTION INTRAVENOUS
Status: CANCELLED | OUTPATIENT
Start: 2019-01-29

## 2019-01-29 RX ORDER — SODIUM CHLORIDE 0.9 % (FLUSH) 0.9 %
10 SYRINGE (ML) INJECTION
Status: DISCONTINUED | OUTPATIENT
Start: 2019-01-29 | End: 2019-02-06

## 2019-01-29 RX ORDER — SODIUM CHLORIDE 0.9 % (FLUSH) 0.9 %
10 SYRINGE (ML) INJECTION
Status: CANCELLED | OUTPATIENT
Start: 2019-01-29

## 2019-01-29 RX ORDER — PROPOFOL 10 MG/ML
VIAL (ML) INTRAVENOUS
Status: DISCONTINUED | OUTPATIENT
Start: 2019-01-29 | End: 2019-01-29

## 2019-01-29 RX ORDER — FAMOTIDINE 10 MG/ML
20 INJECTION INTRAVENOUS
Status: DISCONTINUED | OUTPATIENT
Start: 2019-01-29 | End: 2019-02-04

## 2019-01-29 RX ORDER — METHOTREXATE 25 MG/ML
15 INJECTION, SOLUTION INTRA-ARTERIAL; INTRAMUSCULAR; INTRAVENOUS ONCE
Status: CANCELLED
Start: 2019-01-29 | End: 2019-01-29

## 2019-01-29 RX ADMIN — METHYLPREDNISOLONE SODIUM SUCCINATE 100 MG: 125 INJECTION, POWDER, FOR SOLUTION INTRAMUSCULAR; INTRAVENOUS at 12:01

## 2019-01-29 RX ADMIN — ENOXAPARIN SODIUM 40 MG: 100 INJECTION SUBCUTANEOUS at 05:01

## 2019-01-29 RX ADMIN — FLUCONAZOLE 200 MG: 200 TABLET ORAL at 04:01

## 2019-01-29 RX ADMIN — FAMOTIDINE 20 MG: 10 INJECTION, SOLUTION INTRAVENOUS at 04:01

## 2019-01-29 RX ADMIN — METHOTREXATE 15 MG: 25 INJECTION, SOLUTION INTRA-ARTERIAL; INTRAMUSCULAR; INTRATHECAL; INTRAVENOUS at 10:01

## 2019-01-29 RX ADMIN — PROPOFOL 150 MCG/KG/MIN: 10 INJECTION, EMULSION INTRAVENOUS at 09:01

## 2019-01-29 RX ADMIN — PROPOFOL 50 MG: 10 INJECTION, EMULSION INTRAVENOUS at 09:01

## 2019-01-29 RX ADMIN — DIPHENHYDRAMINE HYDROCHLORIDE 50 MG: 50 INJECTION, SOLUTION INTRAMUSCULAR; INTRAVENOUS at 04:01

## 2019-01-29 RX ADMIN — ACETAMINOPHEN 650 MG: 325 TABLET, FILM COATED ORAL at 04:01

## 2019-01-29 RX ADMIN — HUMAN IMMUNOGLOBULIN G 30 G: 20 LIQUID INTRAVENOUS at 05:01

## 2019-01-29 RX ADMIN — SODIUM CHLORIDE: 0.9 INJECTION, SOLUTION INTRAVENOUS at 08:01

## 2019-01-29 RX ADMIN — METHYLPREDNISOLONE SODIUM SUCCINATE 75 MG: 125 INJECTION, POWDER, FOR SOLUTION INTRAMUSCULAR; INTRAVENOUS at 10:01

## 2019-01-29 RX ADMIN — FAMOTIDINE 20 MG: 10 INJECTION, SOLUTION INTRAVENOUS at 12:01

## 2019-01-29 NOTE — PLAN OF CARE
spoke to pt at bedside in [facility]. Pt awake. Pt with plenty of mucous from mouth. Suctioned by pt with yanker and suction . Pt with no n/v. Pt with no c/o pain. Pt verbalized understanding of dr's/ pt procedure.

## 2019-01-29 NOTE — SUBJECTIVE & OBJECTIVE
Interval History: Endorses improving strength and slight decreasing tenderness. NAEON. Patient has no new acute complaints. Continues to experience dysphagia.    Oncology Treatment Plan:   OP BREAST DOCETAXEL Q3W    Medications:  Continuous Infusions:   sodium chloride 0.9%       Scheduled Meds:   enoxaparin  40 mg Subcutaneous Daily    famotidine (PF)  20 mg Intravenous BID    fluconazole (DIFLUCAN) IVPB  200 mg Intravenous Q24H    methylPREDNISolone sodium succinate  100 mg Intravenous BID     PRN Meds:acetaminophen, ALPRAZolam, dextrose 50%, dextrose 50%, glucagon (human recombinant), glucose, glucose, hydrALAZINE, insulin aspart U-100, ondansetron, oxyCODONE, polyethylene glycol, promethazine, sodium chloride 0.9%     Review of Systems   Constitutional: Positive for fatigue. Negative for activity change, appetite change, chills, diaphoresis, fever and unexpected weight change.   HENT: Positive for facial swelling and trouble swallowing. Negative for congestion, ear discharge, hearing loss, postnasal drip, sinus pressure, sneezing, sore throat and tinnitus.    Eyes: Negative for photophobia, pain and redness.   Respiratory: Negative for apnea, cough, choking, chest tightness, shortness of breath and stridor.    Cardiovascular: Negative for chest pain, palpitations and leg swelling.   Gastrointestinal: Negative for abdominal pain, anal bleeding, constipation, diarrhea, nausea and rectal pain.   Endocrine: Negative for polyuria.   Genitourinary: Negative for dysuria and hematuria.   Musculoskeletal: Positive for myalgias. Negative for arthralgias, back pain, gait problem, joint swelling, neck pain and neck stiffness.   Skin: Positive for rash. Negative for color change and pallor.   Allergic/Immunologic: Negative for immunocompromised state.   Neurological: Positive for weakness. Negative for dizziness, seizures and numbness.   Hematological: Positive for adenopathy. Does not bruise/bleed easily.    Psychiatric/Behavioral: Negative for agitation and behavioral problems.     Objective:     Vital Signs (Most Recent):  Temp: 98 °F (36.7 °C) (01/29/19 1117)  Pulse: 85 (01/29/19 1117)  Resp: 18 (01/29/19 1117)  BP: 137/82 (01/29/19 1117)  SpO2: 98 % (01/29/19 1117) Vital Signs (24h Range):  Temp:  [98 °F (36.7 °C)-99 °F (37.2 °C)] 98 °F (36.7 °C)  Pulse:  [] 85  Resp:  [16-20] 18  SpO2:  [97 %-100 %] 98 %  BP: (119-167)/(68-96) 137/82     Weight: 79.9 kg (176 lb 0.6 oz)  Body mass index is 29.29 kg/m².  Body surface area is 1.91 meters squared.      Intake/Output Summary (Last 24 hours) at 1/29/2019 1127  Last data filed at 1/29/2019 0930  Gross per 24 hour   Intake 600 ml   Output 2100 ml   Net -1500 ml       Physical Exam   Constitutional: She is oriented to person, place, and time. She appears well-developed and well-nourished. No distress.   HENT:   Head: Atraumatic.   Nose: Nose normal.   Mouth/Throat: Oropharyngeal exudate (white tongue exudate-improved from exam prior) present.   Face appears swollen   Eyes: Conjunctivae and EOM are normal. Pupils are equal, round, and reactive to light. Right eye exhibits no discharge. Left eye exhibits no discharge. No scleral icterus.   Neck: Normal range of motion. Neck supple. No tracheal deviation present.   Cardiovascular: Normal rate, regular rhythm and intact distal pulses. Exam reveals no gallop and no friction rub.   Pulmonary/Chest: Effort normal and breath sounds normal. No respiratory distress. She has no wheezes. She has no rales. She exhibits no tenderness.   Abdominal: Soft. Bowel sounds are normal. She exhibits no distension and no mass. There is no tenderness. There is no rebound and no guarding.   Musculoskeletal: Normal range of motion. She exhibits edema (facial edema, upper extremity edema b/l). She exhibits no tenderness or deformity.   Neurological: She is alert and oriented to person, place, and time. No cranial nerve deficit or sensory  deficit.     4/5 shoulder strength on abduction; range of motion limited by weakness. Range of motion still limited by weakness but improved from exam prior. Around 30 degrees above shoulder plane bilaterally  5/5 extension of forearms strength against resistance   Skin: Skin is warm and dry. Capillary refill takes less than 2 seconds. No rash noted. She is not diaphoretic. No erythema. No pallor.   Psychiatric: She has a normal mood and affect.       Significant Labs:   CBC:   Recent Labs   Lab 01/28/19  0400 01/29/19  0500   WBC 10.95 11.00   HGB 10.4* 10.3*   HCT 32.0* 31.6*    192    and CMP:   Recent Labs   Lab 01/28/19  0400 01/29/19  0500    136   K 3.4* 3.6   CL 99 99   CO2 29 26   * 115*   BUN 15 19   CREATININE 0.6 0.6   CALCIUM 8.9 9.1   PROT 6.2 6.3   ALBUMIN 2.9* 3.0*   BILITOT 0.5 0.5   ALKPHOS 80 76   * 160*   ALT 70* 64*   ANIONGAP 10 11   EGFRNONAA >60.0 >60.0       Diagnostic Results:  I have reviewed and interpreted all pertinent imaging results/findings within the past 24 hours.

## 2019-01-29 NOTE — PLAN OF CARE
Problem: Physical Therapy Goal  Goal: Physical Therapy Goal  Goals to be met by: 19     Patient will increase functional independence with mobility by performin. Gait  x 500 feet with Supervision without device. - GOAL MET    2. Increased functional strength to 5/5 for BLE.  3. Standing lower extremity exercise program x 15 reps per handout, with supervision.  4. Pt to perf 6MWT: amb 580', to demonstrate a clinically significant improvement in aerobic capacity.     Outcome: Ongoing (interventions implemented as appropriate)  Pt progressing towards goals.

## 2019-01-29 NOTE — ASSESSMENT & PLAN NOTE
Patient is experiencing difficulty swallowing that has been increasing in severity over last couple of days to the point where patient did not feel as if she could swallow.  As of 01/26, SLP evaluated patient and determined that she should be NPO except for medications until a modified barium swallow could be performed. Possibly candidal esophagitis; patient has oral thrush.    Plan:  - High aspiration risk on modified barium study, strict NPO  - Switched to IV meds where possible  - Fluconazole IVPB 200mg q24hr  - GI consulted, EGD pending today to r/o candidal esophagitis vs dermatomyositis motility dysfunction

## 2019-01-29 NOTE — PROGRESS NOTES
[facility]  Hematology/Oncology  Progress Note    Patient Name: Ermelinda Verde  Admission Date: 1/22/2019  Hospital Length of Stay: 7 days  Code Status: Full Code     Subjective:     HPI:  Ms Verde is a 57 yo F with a prior diagnosis of L sided breast cancer, s/p 3 cycles of nab-paclitaxel and atezolizumab who presents from clinic with a roughly week long, multi-day history of proximal muscle weakness in the shoulder girdle muscles, bilateral and associated with mild tenderness. She reports generalized weakness, but strength impairment with the use of her upper extremities more than lower extremities, and her ADLs are intact. Her last PET scan in December 2018 showed almost complete resolution of her adenopathy, and she had been complaining of a rash, which had been attributed to Nab paclitaxel. Last week, 1/15, she had a CT neck/ since there was concern for facial swelling per symptoms, and the CT showed lymphadenopathy without airway or vascular compromise. 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 Most recently, the patient had been on 60mg of prednisone with a taper and had noted swelling, proximal weakness. She also notes some progressive swallowing difficulty, but attributes this to candidal thrush for which she takes nystatin scheduled. She reports poor PO intake preceding admission, dark, mario colored urine, but denies fevers or joint pain.      Onc History per Dr. Reyna:   She developed a palpable abnormality in her left breast in January 2017 which she noted on self-examination.  A diagnostic mammogram on January 19 showed a greater than 1 cm nodule in the upper outer portion of left breast.  By ultrasound this was lobulated and hypoechoic measuring 1.75 x 1.51 x 1.96 cm.     On January 24,  2017 a core needle biopsy was performed which showed infiltrating ductal carcinoma, high grade.  The tumor was ER negative, CO negative, and HER-2 negative.  A follow-up ultrasound on December 6 showed 2.5 x 2.2 x 1.5 cm left breast mass.  There was no abnormality noted in the left axilla.     She underwent sentinel lymph node biopsy on February 22.  That showed 4 negative lymph nodes.     She had 4 cycles of  Wilbur-adjuvantTaxotere and Cytoxan completed  on 5/9/17.     On June 26 she underwent left mastectomy.  That revealed 2 foci of invasive high-grade carcinoma measuring 14 mm and 1.5 mm.  Margins were negative.     She completed 4 cycles of adjuvant Adriamycin on September 12, 2017.     In October, she developed some left supraclavicular lymphadenopathy which turned out to be recurrence.     A fine-needle aspirate of the lymph node was performed on October 19th.  That showed metastatic carcinoma consistent with breast primary which was ER negative, CO negative and HER 2-negative.           Interval History: Endorses improving strength and slight decreasing tenderness. NAEON. Patient has no new acute complaints. Continues to experience dysphagia.    Oncology Treatment Plan:   OP BREAST DOCETAXEL Q3W    Medications:  Continuous Infusions:   sodium chloride 0.9%       Scheduled Meds:   enoxaparin  40 mg Subcutaneous Daily    famotidine (PF)  20 mg Intravenous BID    fluconazole (DIFLUCAN) IVPB  200 mg Intravenous Q24H    methylPREDNISolone sodium succinate  100 mg Intravenous BID     PRN Meds:acetaminophen, ALPRAZolam, dextrose 50%, dextrose 50%, glucagon (human recombinant), glucose, glucose, hydrALAZINE, insulin aspart U-100, ondansetron, oxyCODONE, polyethylene glycol, promethazine, sodium chloride 0.9%     Review of Systems   Constitutional: Positive for fatigue. Negative for activity change, appetite change, chills, diaphoresis, fever and unexpected weight change.   HENT: Positive for facial swelling and  trouble swallowing. Negative for congestion, ear discharge, hearing loss, postnasal drip, sinus pressure, sneezing, sore throat and tinnitus.    Eyes: Negative for photophobia, pain and redness.   Respiratory: Negative for apnea, cough, choking, chest tightness, shortness of breath and stridor.    Cardiovascular: Negative for chest pain, palpitations and leg swelling.   Gastrointestinal: Negative for abdominal pain, anal bleeding, constipation, diarrhea, nausea and rectal pain.   Endocrine: Negative for polyuria.   Genitourinary: Negative for dysuria and hematuria.   Musculoskeletal: Positive for myalgias. Negative for arthralgias, back pain, gait problem, joint swelling, neck pain and neck stiffness.   Skin: Positive for rash. Negative for color change and pallor.   Allergic/Immunologic: Negative for immunocompromised state.   Neurological: Positive for weakness. Negative for dizziness, seizures and numbness.   Hematological: Positive for adenopathy. Does not bruise/bleed easily.   Psychiatric/Behavioral: Negative for agitation and behavioral problems.     Objective:     Vital Signs (Most Recent):  Temp: 98 °F (36.7 °C) (01/29/19 1117)  Pulse: 85 (01/29/19 1117)  Resp: 18 (01/29/19 1117)  BP: 137/82 (01/29/19 1117)  SpO2: 98 % (01/29/19 1117) Vital Signs (24h Range):  Temp:  [98 °F (36.7 °C)-99 °F (37.2 °C)] 98 °F (36.7 °C)  Pulse:  [] 85  Resp:  [16-20] 18  SpO2:  [97 %-100 %] 98 %  BP: (119-167)/(68-96) 137/82     Weight: 79.9 kg (176 lb 0.6 oz)  Body mass index is 29.29 kg/m².  Body surface area is 1.91 meters squared.      Intake/Output Summary (Last 24 hours) at 1/29/2019 1127  Last data filed at 1/29/2019 0930  Gross per 24 hour   Intake 600 ml   Output 2100 ml   Net -1500 ml       Physical Exam   Constitutional: She is oriented to person, place, and time. She appears well-developed and well-nourished. No distress.   HENT:   Head: Atraumatic.   Nose: Nose normal.   Mouth/Throat: Oropharyngeal exudate  (white tongue exudate-improved from exam prior) present.   Face appears swollen   Eyes: Conjunctivae and EOM are normal. Pupils are equal, round, and reactive to light. Right eye exhibits no discharge. Left eye exhibits no discharge. No scleral icterus.   Neck: Normal range of motion. Neck supple. No tracheal deviation present.   Cardiovascular: Normal rate, regular rhythm and intact distal pulses. Exam reveals no gallop and no friction rub.   Pulmonary/Chest: Effort normal and breath sounds normal. No respiratory distress. She has no wheezes. She has no rales. She exhibits no tenderness.   Abdominal: Soft. Bowel sounds are normal. She exhibits no distension and no mass. There is no tenderness. There is no rebound and no guarding.   Musculoskeletal: Normal range of motion. She exhibits edema (facial edema, upper extremity edema b/l). She exhibits no tenderness or deformity.   Neurological: She is alert and oriented to person, place, and time. No cranial nerve deficit or sensory deficit.     4/5 shoulder strength on abduction; range of motion limited by weakness. Range of motion still limited by weakness but improved from exam prior. Around 30 degrees above shoulder plane bilaterally  5/5 extension of forearms strength against resistance   Skin: Skin is warm and dry. Capillary refill takes less than 2 seconds. No rash noted. She is not diaphoretic. No erythema. No pallor.   Psychiatric: She has a normal mood and affect.       Significant Labs:   CBC:   Recent Labs   Lab 01/28/19  0400 01/29/19  0500   WBC 10.95 11.00   HGB 10.4* 10.3*   HCT 32.0* 31.6*    192    and CMP:   Recent Labs   Lab 01/28/19  0400 01/29/19  0500    136   K 3.4* 3.6   CL 99 99   CO2 29 26   * 115*   BUN 15 19   CREATININE 0.6 0.6   CALCIUM 8.9 9.1   PROT 6.2 6.3   ALBUMIN 2.9* 3.0*   BILITOT 0.5 0.5   ALKPHOS 80 76   * 160*   ALT 70* 64*   ANIONGAP 10 11   EGFRNONAA >60.0 >60.0       Diagnostic Results:  I have  reviewed and interpreted all pertinent imaging results/findings within the past 24 hours.    Assessment/Plan:     * Myositis    - DDX dermatomyositis de haylie vs related to immunotherapy  - trend CPK daily; downtrending generally with a slight bump on 01/27  -restarted fluids 01/26 due to NPO  - myositis labs suspicious for myosits; rheum on board.   - F/u 1/24 skin biopsy. Pending results may need muscle biopsy by general surgery  -MRI left humerus suspicious for myositis  -per rheum recs, methylprednisolone 125 mg IV BID.     1/29 - Per rheumatology  - Methylpred decreased to 100mg IV bid  - Starting MTX 15mg subQ qWeekly  - IVIG 400mg/kg x5 days  - PFTs, myomarker panel, HMGCR pending  - Quantitative IgA 533, IgG 932, IgM 66  - ID consult for fast track vacc         Hypokalemia    Replete prn     Dysphagia    Patient is experiencing difficulty swallowing that has been increasing in severity over last couple of days to the point where patient did not feel as if she could swallow.  As of 01/26, SLP evaluated patient and determined that she should be NPO except for medications until a modified barium swallow could be performed. Possibly candidal esophagitis; patient has oral thrush.    Plan:  - High aspiration risk on modified barium study, strict NPO  - Switched to IV meds where possible  - Fluconazole IVPB 200mg q24hr  - GI consulted, EGD pending today to r/o candidal esophagitis vs dermatomyositis motility dysfunction     Swelling of upper arm    -B/L U/S negative for DVTs  -appears less edematous than day prior  -continue to monitor     Dermatitis    - Skin biopsy from 1/24 pending, appreciate dermatology recs     Candidiasis    -cont Nystatin swish and swallow. Additionally, added fluconaozle 200 mg Po qday on 01/26 as possibility patient is experiencing esophagitis 2/2 to candida  -appears to be improving with fluconazole     Non-traumatic rhabdomyolysis    -see myositis. Continue supportive care, Cr/  electrolyte/ CPK monitoring and aggressive hydration. transaminitis on CMP is likely due to skeletal muscle rhabdo, not hepatic     Drug rash    - Unclear if symptoms related to chemotherapy     Pre-diabetes    -check a1c, 0-5u SSI while on steroids, add prandial insulin as warranted.      Adjustment disorder with depressed mood    -cont home SSRI     Vitamin B12 deficiency    -cont home B12 supplementation      Breast cancer of upper-outer quadrant of left female breast    -On January 24, 2017 a core needle biopsy was performed which showed infiltrating ductal carcinoma, high grade.  The tumor was ER negative, TX negative, and HER-2 negative.  -She had 4 cycles of  Wilbur-adjuvantTaxotere and Cytoxan completed  on 5/9/17.  -On June 26 she underwent left mastectomy.  That revealed 2 foci of invasive high-grade carcinoma measuring 14 mm and 1.5 mm.  Margins were negative.  -She completed 4 cycles of adjuvant Adriamycin on September 12, 2017.  -  A fine-needle aspirate of the lymph node was performed on October 19th.  That showed metastatic carcinoma consistent with breast primary which was ER negative, TX negative and HER 2-negative.    -Per rheumatology: hold paclitaxel agent and atezolizumb at this time - both can cause myositis      Hypertension    -on home amlodipine-benazepril; will hold ACEi for now and observe renal function with CPK elevation in rhabdo.     Plan:  - Amlodipine 10 mg qday on admission  - 1/28 Pt now strict NPO due to failed swallow study, now on hydralazine 10mg IV q8h WCTM and adjust PRN              Noah Patel MD  Hematology/Oncology  [facility]

## 2019-01-29 NOTE — PT/OT/SLP PROGRESS
"Speech Language Pathology Treatment    Patient Name:  Ermelinda Verde   MRN:  [id number]  Admitting Diagnosis: Myositis    Recommendations:                 General Recommendations:  Dysphagia therapy  Diet recommendations:  NPO, Liquid Diet Level: NPO   Aspiration Precautions: small amounts of ice chips for pleasure and Strict aspiration precautions   General Precautions: Standard, aspiration, NPO  Communication strategies:  none    Subjective     "I'm a little sleepy"    Pain/Comfort:  · Pain Rating 1: 0/10  · Pain Rating Post-Intervention 1: 0/10    Objective:     Has the patient been evaluated by SLP for swallowing?   Yes  Keep patient NPO? Yes   Current Respiratory Status: room air      Pt seen bedside for session with  present. Reviewed results of MBSS with pt and . They verbalized understanding. Pt accepted ice chip trials x10. Occasional throat clear noted, but no incidents of coughing and vocal quality remained clear. Pt coughing up small amounts of clear phlegm, which she reports has been ongoing. Pt reports she feels swallow function is improving. Recommend small amounts of ice chips for pleasure after oral care. Pt and  verbalized understanding. Nursing notified and white board updated.      Assessment:     Ermelinda Verde is a 58 y.o. female with an SLP diagnosis of Dysphagia.      Goals:   Multidisciplinary Problems     SLP Goals        Problem: SLP Goal    Goal Priority Disciplines Outcome   SLP Goal     SLP Ongoing (interventions implemented as appropriate)   Description:  Speech-Language Pathology Goals  Goals expected to be met by 2/2:   1) Pt will participate in ongoing swallow assessment to determine appropriateness of po diet.   2) Pt will participate in MBSS to further assess swallow function.  - met  3) Pt will participate in trial dysphagia tx in attempt to improve strength/safety  of swallow .  4) Pt / family education regarding dysphagia and aspiration risk.            "          Plan:     · Patient to be seen:  5 x/week   · Plan of Care expires:  02/24/19  · Plan of Care reviewed with:  patient, spouse   · SLP Follow-Up:  Yes       Discharge recommendations:  Home Health  Barriers to Discharge:  None    Time Tracking:     SLP Treatment Date:   01/29/19  Speech Start Time:  1215  Speech Stop Time:  1230     Speech Total Time (min):  15 min    Billable Minutes: Treatment Swallowing Dysfunction 15 minutes    Teresa Rapp CCC-SLP  01/29/2019

## 2019-01-29 NOTE — SUBJECTIVE & OBJECTIVE
"Interval History: Patient seen at bedside. Strength continues to improve slowly. Had EGD with GI today. Reports that they did not find "fungus" but did see small ulcers and did a biopsy. She is still NPO. No new rashes. No trouble breathing, f/c, n/v/d.      Current Facility-Administered Medications   Medication Frequency    0.9%  NaCl infusion Continuous    acetaminophen tablet 650 mg Q4H PRN    ALPRAZolam tablet 0.25 mg TID PRN    dextrose 50% injection 12.5 g PRN    dextrose 50% injection 25 g PRN    enoxaparin injection 40 mg Daily    famotidine (PF) injection 20 mg BID    fluconazole (DIFLUCAN) IVPB 200 mg 100 mL Q24H    glucagon (human recombinant) injection 1 mg PRN    glucose chewable tablet 16 g PRN    glucose chewable tablet 24 g PRN    hydrALAZINE injection 10 mg Q6H PRN    insulin aspart U-100 pen 0-5 Units QID (AC + HS) PRN    methylPREDNISolone sodium succinate injection 100 mg BID    ondansetron disintegrating tablet 8 mg Q8H PRN    oxyCODONE immediate release tablet 5 mg Q6H PRN    polyethylene glycol packet 17 g BID PRN    promethazine tablet 12.5 mg Q6H PRN    sodium chloride 0.9% flush 5 mL PRN     Objective:     Vital Signs (Most Recent):  Temp: 98 °F (36.7 °C) (01/29/19 1117)  Pulse: 85 (01/29/19 1117)  Resp: 18 (01/29/19 1117)  BP: 137/82 (01/29/19 1117)  SpO2: 98 % (01/29/19 1117)  O2 Device (Oxygen Therapy): room air (01/29/19 1015) Vital Signs (24h Range):  Temp:  [98 °F (36.7 °C)-99 °F (37.2 °C)] 98 °F (36.7 °C)  Pulse:  [] 85  Resp:  [16-20] 18  SpO2:  [97 %-100 %] 98 %  BP: (119-167)/(68-96) 137/82     Weight: 79.9 kg (176 lb 0.6 oz) (01/28/19 0400)  Body mass index is 29.29 kg/m².  Body surface area is 1.91 meters squared.      Intake/Output Summary (Last 24 hours) at 1/29/2019 1136  Last data filed at 1/29/2019 0930  Gross per 24 hour   Intake 600 ml   Output 2100 ml   Net -1500 ml       Physical Exam   Constitutional: She is oriented to person, place, and time " and well-developed, well-nourished, and in no distress. No distress.   HENT:   Head: Normocephalic and atraumatic.   Right Ear: External ear normal.   Left Ear: External ear normal.   Bilateral orbital edema with heliotropic rash - resolved   Eyes: Conjunctivae and EOM are normal. Pupils are equal, round, and reactive to light.   Neck: Normal range of motion. Neck supple.   Cardiovascular: Normal rate, regular rhythm, normal heart sounds and intact distal pulses.    Pulmonary/Chest: Effort normal and breath sounds normal. No respiratory distress.   Abdominal: Soft. Bowel sounds are normal.       Right Side Rheumatological Exam     Muscle Strength (0-5 scale):  Neck Flexion:  4.8  Neck Extension: 5  Deltoid:  4.4  Biceps: 5/5   Triceps:  4.8  : 5/5   Iliopsoas: 4.6  Quadriceps:  5   Distal Lower Extremity: 5    Left Side Rheumatological Exam     Muscle Strength (0-5 scale):  Neck Flexion:  4.8  Neck Extension: 5  Deltoid:  4.4  Biceps: 5/5   Triceps:  4.8  :  5/5   Iliopsoas: 4.6  Distal Lower Extremity: 5      Neurological: She is alert and oriented to person, place, and time. GCS score is 15.   Skin: Skin is warm and dry. No rash noted.     Hyperpigmented non raised rash over nape of neck, elbows, knuckles (resemble Gottron's papules), thighs, and ankles.        Psychiatric: Mood, memory, affect and judgment normal.   Musculoskeletal: Normal range of motion. She exhibits edema. She exhibits no tenderness or deformity.   No signs of synovitis. ROM intact   Inability to move bilateral UE above 45 degrees simultaneously.  Able to move a little past 120 on each one alone           Significant Labs:  Recent Results (from the past 48 hour(s))   Strep A culture, throat    Collection Time: 01/28/19  3:00 AM   Result Value Ref Range    Strep A Culture No significant growth    Comprehensive Metabolic Panel (CMP)    Collection Time: 01/28/19  4:00 AM   Result Value Ref Range    Sodium 138 136 - 145 mmol/L    Potassium  3.4 (L) 3.5 - 5.1 mmol/L    Chloride 99 95 - 110 mmol/L    CO2 29 23 - 29 mmol/L    Glucose 119 (H) 70 - 110 mg/dL    BUN, Bld 15 6 - 20 mg/dL    Creatinine 0.6 0.5 - 1.4 mg/dL    Calcium 8.9 8.7 - 10.5 mg/dL    Total Protein 6.2 6.0 - 8.4 g/dL    Albumin 2.9 (L) 3.5 - 5.2 g/dL    Total Bilirubin 0.5 0.1 - 1.0 mg/dL    Alkaline Phosphatase 80 55 - 135 U/L     (H) 10 - 40 U/L    ALT 70 (H) 10 - 44 U/L    Anion Gap 10 8 - 16 mmol/L    eGFR if African American >60.0 >60 mL/min/1.73 m^2    eGFR if non African American >60.0 >60 mL/min/1.73 m^2   Magnesium    Collection Time: 01/28/19  4:00 AM   Result Value Ref Range    Magnesium 2.1 1.6 - 2.6 mg/dL   Phosphorus    Collection Time: 01/28/19  4:00 AM   Result Value Ref Range    Phosphorus 3.2 2.7 - 4.5 mg/dL   CK    Collection Time: 01/28/19  4:00 AM   Result Value Ref Range    CPK 2249 (H) 20 - 180 U/L   CBC auto differential    Collection Time: 01/28/19  4:00 AM   Result Value Ref Range    WBC 10.95 3.90 - 12.70 K/uL    RBC 3.78 (L) 4.00 - 5.40 M/uL    Hemoglobin 10.4 (L) 12.0 - 16.0 g/dL    Hematocrit 32.0 (L) 37.0 - 48.5 %    MCV 85 82 - 98 fL    MCH 27.5 27.0 - 31.0 pg    MCHC 32.5 32.0 - 36.0 g/dL    RDW 17.2 (H) 11.5 - 14.5 %    Platelets 210 150 - 350 K/uL    MPV 9.5 9.2 - 12.9 fL    Immature Granulocytes 0.9 (H) 0.0 - 0.5 %    Gran # (ANC) 9.4 (H) 1.8 - 7.7 K/uL    Immature Grans (Abs) 0.10 (H) 0.00 - 0.04 K/uL    Lymph # 0.8 (L) 1.0 - 4.8 K/uL    Mono # 0.6 0.3 - 1.0 K/uL    Eos # 0.0 0.0 - 0.5 K/uL    Baso # 0.01 0.00 - 0.20 K/uL    nRBC 0 0 /100 WBC    Gran% 85.5 (H) 38.0 - 73.0 %    Lymph% 7.7 (L) 18.0 - 48.0 %    Mono% 5.8 4.0 - 15.0 %    Eosinophil% 0.0 0.0 - 8.0 %    Basophil% 0.1 0.0 - 1.9 %    Differential Method Automated    Aldolase    Collection Time: 01/28/19  4:00 AM   Result Value Ref Range    Aldolase 15.4 (H) 1.2 - 7.6 U/L   Immunoglobulins (IgG, IgA, IgM) Quantitative    Collection Time: 01/28/19  7:31 PM   Result Value Ref Range    IgG  - Serum 932 650 - 1600 mg/dL    IgA 533 (H) 40 - 350 mg/dL    IgM 66 50 - 300 mg/dL   Gamma GT    Collection Time: 01/28/19  7:31 PM   Result Value Ref Range    GGT 29 8 - 55 U/L   Comprehensive Metabolic Panel (CMP)    Collection Time: 01/29/19  5:00 AM   Result Value Ref Range    Sodium 136 136 - 145 mmol/L    Potassium 3.6 3.5 - 5.1 mmol/L    Chloride 99 95 - 110 mmol/L    CO2 26 23 - 29 mmol/L    Glucose 115 (H) 70 - 110 mg/dL    BUN, Bld 19 6 - 20 mg/dL    Creatinine 0.6 0.5 - 1.4 mg/dL    Calcium 9.1 8.7 - 10.5 mg/dL    Total Protein 6.3 6.0 - 8.4 g/dL    Albumin 3.0 (L) 3.5 - 5.2 g/dL    Total Bilirubin 0.5 0.1 - 1.0 mg/dL    Alkaline Phosphatase 76 55 - 135 U/L     (H) 10 - 40 U/L    ALT 64 (H) 10 - 44 U/L    Anion Gap 11 8 - 16 mmol/L    eGFR if African American >60.0 >60 mL/min/1.73 m^2    eGFR if non African American >60.0 >60 mL/min/1.73 m^2   Magnesium    Collection Time: 01/29/19  5:00 AM   Result Value Ref Range    Magnesium 2.5 1.6 - 2.6 mg/dL   Phosphorus    Collection Time: 01/29/19  5:00 AM   Result Value Ref Range    Phosphorus 3.3 2.7 - 4.5 mg/dL   CK    Collection Time: 01/29/19  5:00 AM   Result Value Ref Range    CPK 1789 (H) 20 - 180 U/L   CBC auto differential    Collection Time: 01/29/19  5:00 AM   Result Value Ref Range    WBC 11.00 3.90 - 12.70 K/uL    RBC 3.81 (L) 4.00 - 5.40 M/uL    Hemoglobin 10.3 (L) 12.0 - 16.0 g/dL    Hematocrit 31.6 (L) 37.0 - 48.5 %    MCV 83 82 - 98 fL    MCH 27.0 27.0 - 31.0 pg    MCHC 32.6 32.0 - 36.0 g/dL    RDW 17.7 (H) 11.5 - 14.5 %    Platelets 192 150 - 350 K/uL    MPV 9.8 9.2 - 12.9 fL    Immature Granulocytes 1.0 (H) 0.0 - 0.5 %    Gran # (ANC) 9.4 (H) 1.8 - 7.7 K/uL    Immature Grans (Abs) 0.11 (H) 0.00 - 0.04 K/uL    Lymph # 0.8 (L) 1.0 - 4.8 K/uL    Mono # 0.7 0.3 - 1.0 K/uL    Eos # 0.0 0.0 - 0.5 K/uL    Baso # 0.01 0.00 - 0.20 K/uL    nRBC 0 0 /100 WBC    Gran% 85.1 (H) 38.0 - 73.0 %    Lymph% 7.2 (L) 18.0 - 48.0 %    Mono% 6.6 4.0 - 15.0 %     Eosinophil% 0.0 0.0 - 8.0 %    Basophil% 0.1 0.0 - 1.9 %    Differential Method Automated      Results for ROMEO PEREZ (MRN [id number]) as of 1/29/2019 11:35   Ref. Range 1/25/2019 04:20 1/26/2019 04:00 1/27/2019 04:21 1/28/2019 04:00 1/29/2019 05:00   CPK Latest Ref Range: 20 - 180 U/L 2224 (H) 1947 (H) 2076 (H) 2249 (H) 1789 (H)     Results for ROMEO PEREZ (MRN [id number]) as of 1/28/2019 10:23   Ref. Range 1/24/2019 03:40 1/25/2019 04:20 1/26/2019 04:00 1/27/2019 04:21 1/28/2019 04:00   Aldolase Latest Ref Range: 1.2 - 7.6 U/L 12.7 (H) 14.2 (H) 13.7 (H) 13.1 (H) 15.4 (H)   Results for ROMEO PEREZ (MRN [id number]) as of 1/28/2019 10:23   Ref. Range 1/22/2019 22:24   Sed Rate Latest Ref Range: 0 - 36 mm/Hr 50 (H)     Results for ROMEO PEREZ (MRN [id number]) as of 1/28/2019 10:23   Ref. Range 1/22/2019 22:24   CRP Latest Ref Range: 0.0 - 8.2 mg/L 31.1 (H)      Results for ROMEO PEREZ (MRN [id number]) as of 1/28/2019 10:23   Ref. Range 1/22/2019 22:24 1/25/2019 17:33   DARCY HEP-2 Titer Unknown Positive 1:640 Sp...    Anti-SSA Antibody Latest Ref Range: 0.00 - 19.99 EU 0.49    Anti-SSA Interpretation Latest Ref Range: Negative  Negative    Anti-SSB Antibody Latest Ref Range: 0.00 - 19.99 EU 0.11    Anti-SSB Interpretation Latest Ref Range: Negative  Negative    ds DNA Ab Latest Ref Range: Negative 1:10  Negative 1:10    Anti Sm Antibody Latest Ref Range: 0.00 - 19.99 EU 0.58    Anti-Sm Interpretation Latest Ref Range: Negative  Negative    Anti Sm/RNP Antibody Latest Ref Range: 0.00 - 19.99 EU 0.62    Anti-Sm/RNP Interpretation Latest Ref Range: Negative  Negative    DARCY Screen Latest Ref Range: Negative <1:160  Positive (A)    Complement (C-3) Latest Ref Range: 50 - 180 mg/dL  106   Complement (C-4) Latest Ref Range: 11 - 44 mg/dL  29   Meg-1 Autoantibodies Latest Ref Range: <1.0 Index <1.00    Results for ROMEO PEREZ (MRN [id number]) as of 1/28/2019 10:23   Ref. Range 1/23/2019 02:55  1/23/2019 02:56   Specimen UA Unknown Urine, Clean Catch    Color, UA Latest Ref Range: Yellow, Straw, [place]  Colorless (A)    pH, UA Latest Ref Range: 5.0 - 8.0  6.0    Specific Gravity, UA Latest Ref Range: 1.005 - 1.030  1.005    Appearance, UA Latest Ref Range: Clear  Clear    Protein, UA Latest Ref Range: Negative  Negative    Glucose, UA Latest Ref Range: Negative  Negative    Ketones, UA Latest Ref Range: Negative  Negative    Occult Blood UA Latest Ref Range: Negative  Negative    Nitrite, UA Latest Ref Range: Negative  Negative    Bilirubin (UA) Latest Ref Range: Negative  Negative    Leukocytes, UA Latest Ref Range: Negative  Trace (A)    RBC, UA Latest Ref Range: 0 - 4 /hpf  2   WBC, UA Latest Ref Range: 0 - 5 /hpf  3   Squam Epithel, UA Latest Units: /hpf  0   Microscopic Comment Unknown  SEE COMMENT     Significant Imaging:  MRI Humerus w/ and w/o contrast:  Impression       1. Diffuse edema and enhancement of the visualized left upper extremity musculature, most pronounced proximally, most notably of the deltoid and biceps musculature as detailed above.  Findings are nonspecific although could relate to a nonspecific myelitis particularly in light of patient's reported history.  Clinical correlation with appropriate history and lab markers advised.  2. No evidence of abscess or osteomyelitis.  This report was flagged in Epic as abnormal.     NM PET CT 12/27/18:  Impression       See above    Significant improvement in all the previously seen lymph nodes involving the left lower neck, supraclavicular region, axillary and retropectoral region.    Index left retropectoral SUV max 3.57, previously 27.2.     Skin biopsy 1/24/19: - interface vacuolar dermatitis consistent with dermatomyositis

## 2019-01-29 NOTE — CONSULTS
[facility]  Infectious Disease  Consult Note    Patient Name: Ermelinda Verde  MRN: [id number]  Admission Date: 1/22/2019  Hospital Length of Stay: 7 days  Attending Physician: Dale Dubon MD  Primary Care Provider: Reta Weeks MD     Isolation Status: No active isolations      Inpatient consult to Infectious Diseases  Consult performed by: Serafin Schaffer PA-C  Consult ordered by: Abigail Isaac MD      ID consult received. Chart being reviewed. Full consult note with recommendations to follow.      Thank you,  Serafin Schaffer PA-C  [id number]

## 2019-01-29 NOTE — [employer]ATION PATIENT INSTRUCTIONS
Discharge Summary/Instructions after an Endoscopic Procedure  Patient Name: Ermelinda Verde  Patient MRN: [id number]  Patient YOB: 1960 Tuesday, January 29, 2019  Pernell Rosas MD  RESTRICTIONS:  During your procedure today, you received medications for sedation.  These   medications may affect your judgment, balance and coordination.  Therefore,   for 24 hours, you have the following restrictions:   - DO NOT drive a car, operate machinery, make legal/financial decisions,   sign important papers or drink alcohol.    ACTIVITY:  Today: no heavy lifting, straining or running due to procedural   sedation/anesthesia.  The following day: return to full activity including work.  DIET:  Eat and drink normally unless instructed otherwise.     TREATMENT FOR COMMON SIDE EFFECTS:  - Mild abdominal pain, nausea, belching, bloating or excessive gas:  rest,   eat lightly and use a heating pad.  - Sore Throat: treat with throat lozenges and/or gargle with warm salt   water.  - Because air was used during the procedure, expelling large amounts of air   from your rectum or belching is normal.  - If a bowel prep was taken, you may not have a bowel movement for 1-3 days.    This is normal.  SYMPTOMS TO WATCH FOR AND REPORT TO YOUR PHYSICIAN:  1. Abdominal pain or bloating, other than gas cramps.  2. Chest pain.  3. Back pain.  4. Signs of infection such as: chills or fever occurring within 24 hours   after the procedure.  5. Rectal bleeding, which would show as bright red, maroon, or black stools.   (A tablespoon of blood from the rectum is not serious, especially if   hemorrhoids are present.)  6. Vomiting.  7. Weakness or dizziness.  GO DIRECTLY TO THE NEAREST EMERGENCY ROOM IF YOU HAVE ANY OF THE FOLLOWING:      Difficulty breathing              Chills and/or fever over 101 F   Persistent vomiting and/or vomiting blood   Severe abdominal pain   Severe chest pain   Black, tarry stools   Bleeding- more than one  tablespoon   Any other symptom or condition that you feel may need urgent attention  Your doctor recommends these additional instructions:  If any biopsies were taken, your doctors clinic will contact you in 1 to 2   weeks with any results.  - Return patient to hospital to for ongoing care.   - Await pathology results.   - Use a proton pump inhibitor PO BID.   - The findings and recommendations were discussed with the designated   responsible adult.   - Repeat upper endoscopy in 8 weeks to check healing.   For questions, problems or results please call your physician - Pernell Rosas MD at Work:  [phone].  [facility], EMERGENCY ROOM PHONE NUMBER: [phone]  IF A COMPLICATION OR EMERGENCY SITUATION ARISES AND YOU ARE UNABLE TO REACH   YOUR PHYSICIAN - GO DIRECTLY TO THE EMERGENCY ROOM.  Pernell Rosas MD  1/29/2019 9:36:04 AM  This report has been verified and signed electronically.  [employer]

## 2019-01-29 NOTE — HPI
Ermelinda Verde is a 57 y/o female with past history of breast CA s/p resection, and chemo and immunotherapy, and now with recurrence s/p immunotherapy, and started to have a rash ~1 month ago, followed by proximal weakness and dysphagia in the past 2 weeks. 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 Patient is being treated with IV steroids. The patient was noted to have oral thrush, and GI was consulted for EGD evaluation for candida esophagitis rule out.    The patient states that her dysphagia started around the time she had muscle weakness. She states that it started gradually to both solids and liquids, and she eventually started choking on liquids. She did have oral thrush that she was treating with nystatin. Fluconazole was started on 1/26, with improvement of thrush. She denies odynophagia. She notes her dysphagia seems to be overall improving, but she is not sure if it is due to anti-fungals or steroids. Denies use of anti-coagulation. Did not have anything to eat or drink since 1/25. Never had swallowing issues in the past.

## 2019-01-29 NOTE — CONSULTS
Infectious Disease Pre-Biologic Response Modifier    Requesting Physician: Dr. Thakur    Subjective:     Date of Consultation:  1/22/2019     Referring Physician:  Dr. Weeks     Primary Care Physician:  Dr. Reta Weeks MD     Reason for Consultation:   Recipient Evaluation    Ermelinda Verde is a 58 y.o. female with a history of Myositis.   Ermelinda Verde has not had recurrent infections.    Patient has not had skin infections.    Patient has not had periodontal infections.    Patient has not had a history of syphilis.    Patient has not had a history of pararsites or worms.  Patient has not been exposed to someone with active TB.     Patient has not had a prior tb skin test.  Patient does not plan to travel outside of the [place] following transplantation.  Patient's occupation: Assistant     Patient does not have contact with pets or animals  Patient reports hobby to be None.  Patient does not eat raw or undercooked meat, fish or shellfish.  Patient has not had a surgical removal of the spleen.    Immunization History   Administered Date(s) Administered    Influenza - Quadrivalent - PF 10/24/2018    Tdap 03/08/2018       Review of Systems   Constitutional: Negative for activity change, appetite change, chills, diaphoresis, fatigue, fever and unexpected weight change.   HENT: Negative for congestion, ear pain, hearing loss, sore throat and tinnitus.    Eyes: Negative for pain, redness, itching and visual disturbance.   Respiratory: Positive for shortness of breath. Negative for cough, chest tightness and wheezing.    Cardiovascular: Negative for chest pain.   Gastrointestinal: Negative.  Negative for abdominal pain, constipation, diarrhea, nausea and vomiting.   Endocrine: Negative for cold intolerance and heat intolerance.   Genitourinary: Negative.  Negative for decreased urine volume, difficulty urinating, dysuria, flank pain, frequency, hematuria and urgency.   Musculoskeletal: Positive for arthralgias  and myalgias. Negative for back pain and neck pain.   Skin: Positive for rash. Negative for wound.   Allergic/Immunologic: Negative for environmental allergies, food allergies and immunocompromised state.   Neurological: Negative.  Negative for dizziness, weakness, light-headedness, numbness and headaches.   Hematological: Negative for adenopathy. Does not bruise/bleed easily.   Psychiatric/Behavioral: Negative for agitation, behavioral problems and confusion. The patient is nervous/anxious.        Objective:   Physical Exam   Constitutional: She is oriented to person, place, and time and well-developed, well-nourished, and in no distress. No distress.   Eyes: Pupils are equal, round, and reactive to light. Right eye exhibits no discharge. Left eye exhibits no discharge.   Neck: Normal range of motion.   Cardiovascular: Normal rate and regular rhythm.   No murmur heard.  Pulmonary/Chest: Effort normal. No respiratory distress. She has no rales.   Abdominal: Soft. She exhibits no distension. There is no tenderness. There is no rebound.   Lymphadenopathy:        Head (right side): No submental, no submandibular, no tonsillar, no preauricular, no posterior auricular and no occipital adenopathy present.        Head (left side): No submental, no submandibular, no tonsillar, no preauricular, no posterior auricular and no occipital adenopathy present.     She has no cervical adenopathy.     She has no axillary adenopathy.   Neurological: She is alert and oriented to person, place, and time.   Skin: Skin is warm. She is not diaphoretic. No erythema.       Diagnostics:  RPR   Date Value Ref Range Status   01/28/2019 Non-reactive Non-reactive Final     No results found for: CMVANTIBODIE  No results found for: HIV1X2  No results found for: HTLVIIIANTIB  Hepatitis B Surface Ag   Date Value Ref Range Status   01/28/2019 Negative  Final     Hep B Core Total Ab   Date Value Ref Range Status   01/28/2019 Negative  Final      Hepatitis C Ab   Date Value Ref Range Status   01/28/2019 Negative  Final     No results found for: TOXOIGG  No components found for: TOXOIGGINTER  No results found for: OFQ6NSM  No results found for: MNG4FUQ  No results found for: VARICELLAZOS  No results found for: VARICELLAINT  No results found for: STRONGANTIGG  No results found for: EPSTEINBARRV  Hep B S Ab   Date Value Ref Range Status   01/28/2019 Negative  Final     Hep A IgG positive    Diagnoses:  1. Myositis, unspecified myositis type, unspecified site    2. Polymyositis associated with autoimmune disease    3. Rhabdomyolysis    4. Myositis    5. Dermatomyositis    6. Esophageal dysphagia    7. Adjustment disorder with depressed mood    8. Breast cancer of upper-outer quadrant of left female breast    9. Candidiasis    10. Dermatitis    11. Drug rash    12. Dysphagia    13. Hypertension    14. Hypokalemia    15. Hyponatremia    16. Non-traumatic rhabdomyolysis    17. Pre-diabetes    18. Swelling of upper arm    19. Vitamin B12 deficiency        The patient's Quantiferon test was reviewed and is pending.  The patient's immunization history was reviewed and based on the patients immunization history and serologies, immunizations were ordered.  The patient was encouraged to contact us about any problems that may develop after immunization and possible side effects were reviewed.      Assessment/Plan:     Biologic Response Modifier Candidacy: Based on available information, there are no identified significant barriers to transplantation from an infectious disease standpoint.   Quantiferon gold, HIV, strongyloides IgG and RPR pending. If positive, please refer to ID clinic.    - Prevnar 13 today followed by Pneumovax in 8 weeks  - Hepatitis B vaccine (40 mcg/mL) today, in 1 month and 6 months  - Shingrix Prescription given.       Final determination of transplant candidacy will be made once evaluation is complete.    Serafin Schaffer,  GRAZYNA    Counseling: I discussed with Ermelinda the risk for increased susceptibility to infections while taking BRMs.  The patients has been counseled on the importance of vaccinations including but not limited to a yearly flu vaccine.  Specific guidance has been provided to the patient regarding the patients occupation, hobbies and activities to avoid future infectious complications including but not limited to avoiding undercooked meats and seafood, proper hygiene, and contact with animals.

## 2019-01-29 NOTE — CONSULTS
"  [facility]  Adult Nutrition  Consult Note    SUMMARY     Recommendations    Recommendation/Intervention:   1.Recommend Isosource 1.5 @65ml/hr +water flushes q6hrs    -Provides 2340kcal, 106g/Pro, 1214ml free fl. flushes per MD.    -Meets 100% EEN/EPN.    -Start w 10ml/hr and increase by 10 ml/hr q4hr as tolerated until goal rate achieved.    -Hold for residuals > 500ml  Goals: tolerate TF at goal rate  Nutrition Goal Status: new  Communication of RD Recs: discussed on rounds    Reason for Assessment    Reason For Assessment: consult  Diagnosis: (Myositis)  Relevant Medical History: breast cancer, Diverticulosis, HTN, Gastritis  Interdisciplinary Rounds: attended  General Information Comments: No c/o abdominal pain NVD at this time.  Pt NPO. NG in place but currently clamped. Barium swallow study completed today, SLP recs strict NPO.  Unable to complete NPFE as pt PIETRO at time of visit.  per chart review pt has wt loss of 11.2 %, po intake 0%. will complete NFPE on RD f/u    Nutrition Risk Screen    Nutrition Risk Screen: dysphagia or difficulty swallowing    Nutrition/Diet History    Spiritual, Cultural Beliefs, Restorationism Practices, Values that Affect Care: no    Anthropometrics    Temp: 98 °F (36.7 °C)  Height: 5' 5" (165.1 cm)  Height (inches): 65 in  Weight Method: Standard Scale  Weight: 79.9 kg (176 lb 0.6 oz)  Weight (lb): 176.04 lb  Ideal Body Weight (IBW), Female: 125 lb  % Ideal Body Weight, Female (lb): 140.83 lb  BMI (Calculated): 29.4  BMI Grade: 30 - 34.9- obesity - grade I  Weight Loss: unintentional  Usual Body Weight (UBW), k.9 kg  % Usual Body Weight: 89.01  % Weight Change From Usual Weight: -11.18 %  Weight Loss Since Admission: 12 lb 5.5 oz  % Weight Change Since Admission: 7.01       Lab/Procedures/Meds    Pertinent Labs Reviewed: reviewed  Pertinent Labs Comments: WBC-11, Glu-115, AST/ALT-160/64  Pertinent Medications Reviewed: reviewed  Pertinent Medications Comments: " IVF-NS, insulin, methylprednisone    Physical Findings/Assessment         Estimated/Assessed Needs    Weight Used For Calorie Calculations: 79.9 kg (176 lb 2.4 oz)  Energy Calorie Requirements (kcal): 5450-1340 kcal/d  Energy Need Method: Kcal/kg(30-35 kcal/kg)  Protein Requirements: 104-120g/d (1.3-1.5g/kg)  Weight Used For Protein Calculations: 79.9 kg (176 lb 2.4 oz)     Estimated Fluid Requirement Method: RDA Method(1ml/kg or per MD)  RDA Method (mL): 1997  CHO Requirement: NA      Nutrition Prescription Ordered    Current Diet Order: NPO    Evaluation of Received Nutrient/Fluid Intake    I/O: -2.1 L SA  Energy Calories Required: not meeting needs  Protein Required: not meeting needs  Fluid Required: not meeting needs  Comments: LBM: 1/27/19  % Intake of Estimated Energy Needs: 0 - 25 %  % Meal Intake: NPO    Nutrition Risk    Level of Risk/Frequency of Follow-up: high     Assessment and Plan    Nutrition Problem  Inadequate oral intake    Related to (etiology):   Pt NPO w/o any other access to nutrient intake    Signs and Symptoms (as evidenced by):   PO intake 0%  Consult for TF recs     Interventions/Recommendations (treatment strategy):  1.Recommend Isosource 1.5 @65ml/hr +water flushes q6hrs    -Provides 2340kcal, 106g/Pro, 1214ml free fl. flushes per MD.    -Meets 100% EEN/EPN.    -Start w 10ml/hr and increase by 10 ml/hr q4hr as tolerated until goal rate achieved.    -Hold for residuals > 500ml    Nutrition Diagnosis Status:   New        Monitor and Evaluation    Food and Nutrient Intake: energy intake, food and beverage intake, enteral nutrition intake  Food and Nutrient Adminstration: diet order, enteral and parenteral nutrition administration  Anthropometric Measurements: weight, weight change  Biochemical Data, Medical Tests and Procedures: (all labs)  Nutrition-Focused Physical Findings: overall appearance     Malnutrition Assessment  Malnutrition Type: acute illness or injury  Energy Intake: severe  energy intake  Weight Loss (Malnutrition): greater than 7.5% in 3 months(11.2% x 3 mo)  Energy Intake (Malnutrition): less than or equal to 50% for greater than or equal to 1 month       Nutrition Follow-Up    RD Follow-up?: Yes

## 2019-01-29 NOTE — ANESTHESIA PREPROCEDURE EVALUATION
01/29/2019  Ermelinda Verde is a 58 y.o., female.    Anesthesia Evaluation    I have reviewed the Patient Summary Reports.    I have reviewed the Nursing Notes.      Review of Systems  Anesthesia Hx:  No problems with previous Anesthesia    Hematology/Oncology:  Hematology Normal   Oncology Normal   Oncology Comments: Breast cancer     EENT/Dental:EENT/Dental Normal   Cardiovascular:   Hypertension    Pulmonary:  Pulmonary Normal    Renal/:  Renal/ Normal     Hepatic/GI:  Hepatic/GI Normal Diverticulosis  Gastritis   Musculoskeletal:  Musculoskeletal Normal    Neurological:   Neuromuscular Disease,    Endocrine:  Endocrine Normal    Dermatological:  Skin Normal    Psych:   Psychiatric History depression          Physical Exam  General:  Obesity    Airway/Jaw/Neck:  Airway Findings: Mouth Opening: Normal Tongue: Normal  General Airway Assessment: Adult  Mallampati: III  Improves to II with phonation.  TM Distance: Normal, at least 6 cm        Eyes/Ears/Nose:  EYES/EARS/NOSE FINDINGS: Normal   Dental:  Dental Findings: In tact   Chest/Lungs:  Chest/Lungs Clear    Heart/Vascular:  Heart Findings: Normal Heart murmur: negative Vascular Findings: Normal    Abdomen:  Abdomen Findings: Normal    Musculoskeletal:  Musculoskeletal Findings: Normal   Skin:  Skin Findings: Normal    Mental Status:  Mental Status Findings: Normal        Anesthesia Plan  Type of Anesthesia, risks & benefits discussed:  Anesthesia Type:  general  Patient's Preference:   Intra-op Monitoring Plan:   Intra-op Monitoring Plan Comments:   Post Op Pain Control Plan:   Post Op Pain Control Plan Comments:   Induction:   IV  Beta Blocker:  Patient is not currently on a Beta-Blocker (No further documentation required).       Informed Consent: Patient understands risks and agrees with Anesthesia plan.  Questions answered. Anesthesia consent  signed with patient.  ASA Score: 2     Day of Surgery Review of History & Physical:    H&P update referred to the surgeon.         Ready For Surgery From Anesthesia Perspective.

## 2019-01-29 NOTE — ANESTHESIA POSTPROCEDURE EVALUATION
"Anesthesia Post Evaluation    Patient: Ermelinda Verde    Procedure(s) Performed: Procedure(s) (LRB):  EGD (ESOPHAGOGASTRODUODENOSCOPY) (N/A)    Final Anesthesia Type: general  Patient location during evaluation: PACU  Patient participation: Yes- Able to Participate  Level of consciousness: awake and alert  Post-procedure vital signs: reviewed and stable  Pain management: adequate  Airway patency: patent  PONV status at discharge: No PONV  Anesthetic complications: no      Cardiovascular status: blood pressure returned to baseline  Respiratory status: spontaneous ventilation and room air  Hydration status: euvolemic  Follow-up not needed.        Visit Vitals  BP (!) 156/89 (BP Location: Right arm, Patient Position: Sitting)   Pulse 80   Temp 37.2 °C (99 °F)   Resp 17   Ht 5' 5" (1.651 m)   Wt 79.9 kg (176 lb 0.6 oz)   LMP  (LMP Unknown)   SpO2 100%   Breastfeeding? No   BMI 29.29 kg/m²       Pain/Joni Score: Joni Score: 9 (1/29/2019  9:35 AM)        "

## 2019-01-29 NOTE — PLAN OF CARE
Report called to rn. Pt being transported via stretcher. Pt awake alert oriented, no c/o pain. No c/o nausea.

## 2019-01-29 NOTE — PT/OT/SLP PROGRESS
"Physical Therapy Treatment    Patient Name:  Ermelinda Verde   MRN:  [id number]    Recommendations:     Discharge Recommendations:  (HHPT)   Discharge Equipment Recommendations: walker, rolling   Barriers to discharge: None    Assessment:     Ermelinda Verde is a 58 y.o. female admitted with a medical diagnosis of Myositis.  She presents with the following impairments/functional limitations:  weakness, impaired endurance, gait instability, impaired functional mobilty, impaired self care skills, impaired balance, decreased lower extremity function, decreased upper extremity function, decreased safety awareness, impaired cardiopulmonary response to activity.  Pt declined in ability to amb further distances during gait training and 6MWT, though not a clinically significant difference.  At approx 5 min into gait training pt begins to decline rapidly, shuffling feet and increasing truncal sway.    Rehab Prognosis: Fair; patient would benefit from acute skilled PT services to address these deficits and reach maximum level of function.    Recent Surgery: Procedure(s) (LRB):  EGD (ESOPHAGOGASTRODUODENOSCOPY) (N/A) Day of Surgery    Plan:     During this hospitalization, patient to be seen 3 x/week to address the identified rehab impairments via gait training, therapeutic activities, therapeutic exercises, neuromuscular re-education and progress toward the following goals:    · Plan of Care Expires:  02/22/19    Subjective     Chief Complaint: Fatigue  Patient/Family Comments/goals: To be able to eat  Pain/Comfort:  · Pain Rating 1: 0/10      Objective:     Communicated with nursing prior to session.  Patient found all lines intact and call button in reach NG tube, telemetry(port)  upon PT entry to room.     "I was just waiting for my meds."    General Precautions: Standard, aspiration, NPO, fall   Orthopedic Precautions:N/A   Braces: N/A     Functional Mobility:  · Transfers:     · Sit to Stand:  supervision with no " AD  · Bed to Chair: supervision with  rolling walker  using  Stand Pivot  · Gait: Pt amb 500', SBA-CGA, RW, increased fatigue noted after 5 min, shuffle gait, increased sway, eyes glazed and rolling in the back of pts head  · Balance: SBA-CGA: dynamic standing balance with AD      AM-PAC 6 CLICK MOBILITY  Turning over in bed (including adjusting bedclothes, sheets and blankets)?: 4  Sitting down on and standing up from a chair with arms (e.g., wheelchair, bedside commode, etc.): 4  Moving from lying on back to sitting on the side of the bed?: 4  Moving to and from a bed to a chair (including a wheelchair)?: 3  Need to walk in hospital room?: 3  Climbing 3-5 steps with a railing?: 3  Basic Mobility Total Score: 21       Therapeutic Activities and Exercises:   Whiteboard updated  Ankle pumps: 20 reps, in sit   LAQs: 20 reps each LE perf individually, in sit  Hip flex: 20 reps each LE perf individually, in sit  Sit-ups: 20 reps, in reclined sitting posture  6MWT: Pt amb 410' = 124.97m, 5/10 RPE following  Gait speed: 0.35m/s      Patient left up in chair with all lines intact, call button in reach and spouse present.    GOALS:   Multidisciplinary Problems     Physical Therapy Goals        Problem: Physical Therapy Goal    Goal Priority Disciplines Outcome Goal Variances Interventions   Physical Therapy Goal     PT, PT/OT Ongoing (interventions implemented as appropriate)     Description:  Goals to be met by: 19     Patient will increase functional independence with mobility by performin. Gait  x 500 feet with Supervision without device. - GOAL MET    2. Increased functional strength to 5/5 for BLE.  3. Standing lower extremity exercise program x 15 reps per handout, with supervision.  4. Pt to perf 6MWT: amb 580', to demonstrate a clinically significant improvement in aerobic capacity.                      Time Tracking:     PT Received On: 19  PT Start Time: 1155     PT Stop Time: 1220  PT Total Time  (min): 25 min     Billable Minutes: Gait Training 15 and Therapeutic Exercise 10    Treatment Type: Treatment  PT/PTA: PT           Lotus Camacho, PT  01/29/2019

## 2019-01-29 NOTE — ASSESSMENT & PLAN NOTE
"59yo F with history of L sided infiltrating ductal carcinoma of the L breast (dx on Jan 2017), HTN, depression, and gastritis was send from hem/onc clinic for evaluation of possible inflammatory myositis.     Patient started on Atelizumab/Abraxane on 11/7/18. Per chart review, patient noticed rashes on the dorsum of her hands on 11/11/18. Seen in urgent care and given topical steroid cream. Then received two more infusions on Atelizumab/Abraxane on 11/21/18 and 12/5/18. Started to notice puffiness around the eyes on 12/11/18. Received another Abraxane infusion on 12/12/18 but this time with hydrocortisone 50 mg which helped reduce the swelling around the eyes. Developed swelling of the face again on 12/15/18. Next infusion of Abraxane done on 12/19/18 with Solucortef and Atelizumab held. Abraxane given again on 1/3/19 with no IV steroid. Patient developed swelling of the face on 1/4/19 and went to urgent care and given short course of prednisone 20 mg BID. On 1/15/19, patient seen in hem/onc clinic with c/o of pressure and tightness around neck. CT scan of chest and neck did not reveal any vascular compression. Started on prednisone 60 mg with taper. On 1/22/18 patient with c/o proximal muscle weakness. CPK found to be elevated around 4k. Patient admitted and given solumedrol 80 mg IV on 1/22/18. Last infusion of Atelizumab on 12/19/18. Last infusion of Abraxane on 1/3/19. Given Solumedrol 1g x 1 on 1/23/18. Seen by Dermatology on 1/24/18 and biopsy done of skin rash. Given distribution of rashes, there was concern for dermatomyositis. Patient started on Solumedrol 125 mg IV BID at this time.      Labs: ESR 50, CRP 31.1, CPK 4562 (trending downward 2224), Aldolase 13.6.  , ALT 64.  UA normal.  CBC unremarkable.  +DARCY 1:640 speckled with negative profile.  Complements normal. Normal GGT. RPR, HIV negative.    Case reports of docetaxel related inflammatory myositis had been documented. "...taxanes have been " "noted to cause disabling but transient arthralgia and myalgias; it is important to consider the possibility of inflammatory myopathy as a possible complication in patients undergoing treatment with these agents." - A case of docetaxel induced myositis and review of literature. Austin et al 2015.    Case reports in Rheumatology   Case reports of atezolizumb (immunomodulator) cause of myositis  - Lucy et al 2017 "Myositis as an adverse even of immune checkpoint blockade for cancer therapy.  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   Dermatomyositis can be associated with malignancy.  MRI of LUE showed edema of the deltoid and biceps.  Exam with proximal muscle weakness: b/l deltoids 4/5, b/l iliopsoas 4.4/5. 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      Inpatient treatments so far:  - Solumedrol 80 mg IV x 1 on 1/22/19  - Solumedrol 1 g IV x 1 on 1/23/19.  - Solumedrol 125 mg IV BID since 1/24/19.     Plan:  - chemotherapy can be re-started as it may help treat Dermatomyositis. Would recommend against checkpoint inhibitor, however, as it may have been a trigger for the dermatomyositis  - f/u EGD done on 1/29/19.  - continue to monitor CPK, aldolase, AST/ALT  - Decrease Solumderol to 100mg IV q12h.  - quantitative immunoglobulins wnl. Can start on IVIg 400 mg/kg daily x 5 days.  Therapy plan in place. Can be released by primary.   -still need to f/u all pending pre-dmard labs. If okay, recommend starting MTX 15 mg sq as part of treatment.  - recommend ID fast track consult for vaccinations.  - check PFTs (spirometry and lung volumes)   - start physical therapy.   - f/u myomarker panel and HMGCR   - f/u " with Dr. Swift and Dr. Campos in Rheumatology at discharge.

## 2019-01-29 NOTE — PLAN OF CARE
Problem: Adult Inpatient Plan of Care  Goal: Plan of Care Review  Outcome: Ongoing (interventions implemented as appropriate)  AAOX4.  VSS.  No complaints of pain.  Pt NPO.  NG tube is clamped, all meds to be put through tube.   is at the bedside.  Bed is in lowest position, call bell is in reach, and pt instructed to call nurse when needing assistance.  Will continue to monitor.

## 2019-01-29 NOTE — SUBJECTIVE & OBJECTIVE
Past Medical History:   Diagnosis Date    Breast cancer 2017    left    Depression     Diverticulosis     Gastritis     Hypertension     Vitamin B12 deficiency 3/8/2018       Past Surgical History:   Procedure Laterality Date    BIOPSY-SENTINEL NODE Left 2017    [facility]    BREAST BIOPSY Left     BREAST RECONSTRUCTION Left 2017     SECTION      COLONOSCOPY  2011    repeat in 10 yrs.    D&C      KMDNLFUHL-HOSC-M-CATH Right 10/31/2018    [facility] OR 2ND FLR    HTCQGBRJP-RAAK-J-CATH Right 2017    [facility]    QGMHHNRPT-[id number]-R-CATH-neck or chest Fluoro needed Consent AM of surgery Right 10/30/2018    [facility] OR 2ND FLR    MASTECTOMY Left 2017    MASTECTOMY Left 2017    [facility]    MYOMECTOMY      PORTACATH PLACEMENT      RECONSTRUCTION-BREAST/FLAP-SCOTT Left 2017    Per[facility]inder Sewell [facility] at [facility] OR    SENTINEL LYMPH NODE BIOPSY  2017    left    TOTAL REDUCTION MAMMOPLASTY Right        Review of patient's allergies indicates:  No Known Allergies  Family History     Problem Relation (Age of Onset)    Breast cancer Sister (52)    Heart disease Father    Hypertension Father, Mother    No Known Problems Brother, Son, Brother, Brother, Sister, Daughter    Thyroid disease Daughter        Tobacco Use    Smoking status: Never Smoker    Smokeless tobacco: Never Used   Substance and Sexual Activity    Alcohol use: Yes     Comment: wine    Drug use: No    Sexual activity: Yes     Partners: Male     Birth control/protection: Post-menopausal     Review of Systems   Constitutional: Positive for activity change. Negative for appetite change, chills and fever.   HENT: Positive for trouble swallowing.    Respiratory: Negative for cough, choking, chest tightness and shortness of  breath.    Cardiovascular: Negative for chest pain and leg swelling.   Gastrointestinal: Negative for abdominal distention, abdominal pain, anal bleeding, blood in stool, constipation, diarrhea, nausea, rectal pain and vomiting.   Genitourinary: Negative for difficulty urinating and dysuria.   Musculoskeletal: Negative for arthralgias and back pain.   Skin: Negative for color change and pallor.   Neurological: Positive for dizziness and weakness. Negative for headaches.   Psychiatric/Behavioral: Negative for agitation and confusion.     Objective:     Vital Signs (Most Recent):  Temp: 98.2 °F (36.8 °C) (01/29/19 0738)  Pulse: 101 (01/29/19 0738)  Resp: 16 (01/29/19 0738)  BP: 119/68 (01/29/19 0738)  SpO2: 100 % (01/29/19 0738) Vital Signs (24h Range):  Temp:  [98 °F (36.7 °C)-98.3 °F (36.8 °C)] 98.2 °F (36.8 °C)  Pulse:  [] 101  Resp:  [16-18] 16  SpO2:  [97 %-100 %] 100 %  BP: (119-167)/(68-89) 119/68     Weight: 79.9 kg (176 lb 0.6 oz) (01/28/19 0400)  Body mass index is 29.29 kg/m².      Intake/Output Summary (Last 24 hours) at 1/29/2019 0848  Last data filed at 1/29/2019 0741  Gross per 24 hour   Intake 300 ml   Output 2400 ml   Net -2100 ml       Lines/Drains/Airways     Central Venous Catheter Line                 Port A Cath Single Lumen right subclavian -- days          Drain                 NG/OG Tube 01/28/19 1452 nasogastric;[place] sump 14 Fr. Right nostril less than 1 day                Physical Exam   Constitutional: She is oriented to person, place, and time. She appears well-developed and well-nourished. No distress.   HENT:   Head: Normocephalic.   Eyes: Conjunctivae are normal. No scleral icterus.   Neck: Normal range of motion. Neck supple.   Cardiovascular: Normal rate and regular rhythm.   Pulmonary/Chest: Effort normal and breath sounds normal.   Abdominal: Soft. Bowel sounds are normal. She exhibits no distension and no mass. There is no tenderness. There is no rebound and no guarding.    Musculoskeletal: Normal range of motion.   Neurological: She is alert and oriented to person, place, and time.   Muscle weakness.   Skin: Skin is warm and dry.   Psychiatric: She has a normal mood and affect.       Significant Labs:  CBC:   Recent Labs   Lab 01/28/19  0400 01/29/19  0500   WBC 10.95 11.00   HGB 10.4* 10.3*   HCT 32.0* 31.6*    192     BMP:   Recent Labs   Lab 01/29/19  0500   *      K 3.6   CL 99   CO2 26   BUN 19   CREATININE 0.6   CALCIUM 9.1   MG 2.5     CMP:   Recent Labs   Lab 01/29/19  0500   *   CALCIUM 9.1   ALBUMIN 3.0*   PROT 6.3      K 3.6   CO2 26   CL 99   BUN 19   CREATININE 0.6   ALKPHOS 76   ALT 64*   *   BILITOT 0.5     Coagulation: No results for input(s): PT, INR, APTT in the last 48 hours.  Lipase: No results for input(s): LIPASE in the last 48 hours.    Significant Imaging:  Imaging results within the past 24 hours have been reviewed.

## 2019-01-29 NOTE — PLAN OF CARE
Problem: Adult Inpatient Plan of Care  Goal: Plan of Care Review    Recommendations     Recommendation/Intervention:   1.Recommend Isosource 1.5 @65ml/hr +water flushes q6hrs               -Provides 2340kcal, 106g/Pro, 1214ml free fl. flushes per MD.               -Meets 100% EEN/EPN.               -Start w 10ml/hr and increase by 10 ml/hr q4hr as tolerated until goal rate achieved.               -Hold for residuals > 500ml  Goals: tolerate TF at goal rate  Nutrition Goal Status: new  Communication of RD Recs: discussed on rounds

## 2019-01-29 NOTE — DISCHARGE INSTRUCTIONS

## 2019-01-29 NOTE — PROGRESS NOTES
[facility]  Rheumatology  Progress Note    Patient Name: Ermelinda Verde  MRN: [id number]  Admission Date: 1/22/2019  Hospital Length of Stay: 7 days  Code Status: Full Code   Attending Provider: Dale Dubon MD  Primary Care Physician: Reta Weeks MD  Principal Problem: Myositis    Subjective:     HPI: 59yo F with history of L sided infiltrating ductal carcinoma of the L breast (dx on Jan 2017), HTN, depression, and gastritis was send from hem/onc clinic for evaluation of possible inflammatory myositis.     L breast cancer s/p completion of 4 cycles of demi-adjuvant taxotere and cytoxan (completed on 5/9/17) and mastectomy (6/26/17) and then 4 cycles of adjuvant Adriamycin (completed 9/12/17). In 10/2017 - patient developed L supraclavicular lymphadenopathy which FNA confirmed as reoccurrence of previously treated breast cancer.     Patient started on Atelizumab/Abraxane on 11/7/18. Per chart review, patient noticed rashes on the dorsum of her hands on 11/11/18. Seen in urgent care and given topical steroid cream. Then received two more infusions on Atelizumab/Abraxane on 11/21/18 and 12/5/18. Started to notice puffiness around the eyes on 12/11/18. Received another Abraxane infusion on 12/12/18 but this time with hydrocortisone 50 mg which helped reduce the swelling around the eyes. Developed swelling of the face again on 12/15/18. Next infusion of Abraxane done on 12/19/18 with Solucortef and Atelizumab held. Abraxane given again on 1/3/19 with no IV steroid. Patient developed swelling of the face on 1/4/19 and went to urgent care and given short course of prednisone 20 mg BID. On 1/15/19, patient seen in hem/onc clinic with c/o of pressure and tightness around neck. CT scan of chest and neck did not reveal any vascular compression. Started on prednisone 60 mg with taper. On 1/22/18 patient with c/o proximal muscle weakness. CPK found to be elevated around 4k. Patient admitted and given solumedrol  "80 mg IV on 1/22/18. Last infusion of Atelizumab on 12/19/18. Last infusion of Abraxane on 1/3/19. Given Solumedrol 1g x 1 on 1/23/18. Seen by Dermatology on 1/24/18 and biopsy done of skin rash. Given distribution of rashes, there was concern for dermatomyositis. Patient started on Solumedrol 125 mg IV BID at this time.     Denies any family history of autoimmune diseases.    No smoking, EtOH, recreational drug usage.    Denies any photosensitivity, joint swelling, unintentional weigh loss, abdominal pain, night sweats, CP, SOB.  +oral thrush.     Interval History: Patient seen at bedside. Strength continues to improve slowly. Had EGD with GI today. Reports that they did not find "fungus" but did see small ulcers and did a biopsy. She is still NPO. No new rashes. No trouble breathing, f/c, n/v/d.      Current Facility-Administered Medications   Medication Frequency    0.9%  NaCl infusion Continuous    acetaminophen tablet 650 mg Q4H PRN    ALPRAZolam tablet 0.25 mg TID PRN    dextrose 50% injection 12.5 g PRN    dextrose 50% injection 25 g PRN    enoxaparin injection 40 mg Daily    famotidine (PF) injection 20 mg BID    fluconazole (DIFLUCAN) IVPB 200 mg 100 mL Q24H    glucagon (human recombinant) injection 1 mg PRN    glucose chewable tablet 16 g PRN    glucose chewable tablet 24 g PRN    hydrALAZINE injection 10 mg Q6H PRN    insulin aspart U-100 pen 0-5 Units QID (AC + HS) PRN    methylPREDNISolone sodium succinate injection 100 mg BID    ondansetron disintegrating tablet 8 mg Q8H PRN    oxyCODONE immediate release tablet 5 mg Q6H PRN    polyethylene glycol packet 17 g BID PRN    promethazine tablet 12.5 mg Q6H PRN    sodium chloride 0.9% flush 5 mL PRN     Objective:     Vital Signs (Most Recent):  Temp: 98 °F (36.7 °C) (01/29/19 1117)  Pulse: 85 (01/29/19 1117)  Resp: 18 (01/29/19 1117)  BP: 137/82 (01/29/19 1117)  SpO2: 98 % (01/29/19 1117)  O2 Device (Oxygen Therapy): room air (01/29/19 " 1015) Vital Signs (24h Range):  Temp:  [98 °F (36.7 °C)-99 °F (37.2 °C)] 98 °F (36.7 °C)  Pulse:  [] 85  Resp:  [16-20] 18  SpO2:  [97 %-100 %] 98 %  BP: (119-167)/(68-96) 137/82     Weight: 79.9 kg (176 lb 0.6 oz) (01/28/19 0400)  Body mass index is 29.29 kg/m².  Body surface area is 1.91 meters squared.      Intake/Output Summary (Last 24 hours) at 1/29/2019 1136  Last data filed at 1/29/2019 0930  Gross per 24 hour   Intake 600 ml   Output 2100 ml   Net -1500 ml       Physical Exam   Constitutional: She is oriented to person, place, and time and well-developed, well-nourished, and in no distress. No distress.   HENT:   Head: Normocephalic and atraumatic.   Right Ear: External ear normal.   Left Ear: External ear normal.   Bilateral orbital edema with heliotropic rash - resolved   Eyes: Conjunctivae and EOM are normal. Pupils are equal, round, and reactive to light.   Neck: Normal range of motion. Neck supple.   Cardiovascular: Normal rate, regular rhythm, normal heart sounds and intact distal pulses.    Pulmonary/Chest: Effort normal and breath sounds normal. No respiratory distress.   Abdominal: Soft. Bowel sounds are normal.       Right Side Rheumatological Exam     Muscle Strength (0-5 scale):  Neck Flexion:  4.8  Neck Extension: 5  Deltoid:  4.4  Biceps: 5/5   Triceps:  4.8  : 5/5   Iliopsoas: 4.6  Quadriceps:  5   Distal Lower Extremity: 5    Left Side Rheumatological Exam     Muscle Strength (0-5 scale):  Neck Flexion:  4.8  Neck Extension: 5  Deltoid:  4.4  Biceps: 5/5   Triceps:  4.8  :  5/5   Iliopsoas: 4.6  Distal Lower Extremity: 5      Neurological: She is alert and oriented to person, place, and time. GCS score is 15.   Skin: Skin is warm and dry. No rash noted.     Hyperpigmented non raised rash over nape of neck, elbows, knuckles (resemble Gottron's papules), thighs, and ankles.        Psychiatric: Mood, memory, affect and judgment normal.   Musculoskeletal: Normal range of motion.  She exhibits edema. She exhibits no tenderness or deformity.   No signs of synovitis. ROM intact   Inability to move bilateral UE above 45 degrees simultaneously.  Able to move a little past 120 on each one alone           Significant Labs:  Recent Results (from the past 48 hour(s))   Strep A culture, throat    Collection Time: 01/28/19  3:00 AM   Result Value Ref Range    Strep A Culture No significant growth    Comprehensive Metabolic Panel (CMP)    Collection Time: 01/28/19  4:00 AM   Result Value Ref Range    Sodium 138 136 - 145 mmol/L    Potassium 3.4 (L) 3.5 - 5.1 mmol/L    Chloride 99 95 - 110 mmol/L    CO2 29 23 - 29 mmol/L    Glucose 119 (H) 70 - 110 mg/dL    BUN, Bld 15 6 - 20 mg/dL    Creatinine 0.6 0.5 - 1.4 mg/dL    Calcium 8.9 8.7 - 10.5 mg/dL    Total Protein 6.2 6.0 - 8.4 g/dL    Albumin 2.9 (L) 3.5 - 5.2 g/dL    Total Bilirubin 0.5 0.1 - 1.0 mg/dL    Alkaline Phosphatase 80 55 - 135 U/L     (H) 10 - 40 U/L    ALT 70 (H) 10 - 44 U/L    Anion Gap 10 8 - 16 mmol/L    eGFR if African American >60.0 >60 mL/min/1.73 m^2    eGFR if non African American >60.0 >60 mL/min/1.73 m^2   Magnesium    Collection Time: 01/28/19  4:00 AM   Result Value Ref Range    Magnesium 2.1 1.6 - 2.6 mg/dL   Phosphorus    Collection Time: 01/28/19  4:00 AM   Result Value Ref Range    Phosphorus 3.2 2.7 - 4.5 mg/dL   CK    Collection Time: 01/28/19  4:00 AM   Result Value Ref Range    CPK 2249 (H) 20 - 180 U/L   CBC auto differential    Collection Time: 01/28/19  4:00 AM   Result Value Ref Range    WBC 10.95 3.90 - 12.70 K/uL    RBC 3.78 (L) 4.00 - 5.40 M/uL    Hemoglobin 10.4 (L) 12.0 - 16.0 g/dL    Hematocrit 32.0 (L) 37.0 - 48.5 %    MCV 85 82 - 98 fL    MCH 27.5 27.0 - 31.0 pg    MCHC 32.5 32.0 - 36.0 g/dL    RDW 17.2 (H) 11.5 - 14.5 %    Platelets 210 150 - 350 K/uL    MPV 9.5 9.2 - 12.9 fL    Immature Granulocytes 0.9 (H) 0.0 - 0.5 %    Gran # (ANC) 9.4 (H) 1.8 - 7.7 K/uL    Immature Grans (Abs) 0.10 (H) 0.00 - 0.04  K/uL    Lymph # 0.8 (L) 1.0 - 4.8 K/uL    Mono # 0.6 0.3 - 1.0 K/uL    Eos # 0.0 0.0 - 0.5 K/uL    Baso # 0.01 0.00 - 0.20 K/uL    nRBC 0 0 /100 WBC    Gran% 85.5 (H) 38.0 - 73.0 %    Lymph% 7.7 (L) 18.0 - 48.0 %    Mono% 5.8 4.0 - 15.0 %    Eosinophil% 0.0 0.0 - 8.0 %    Basophil% 0.1 0.0 - 1.9 %    Differential Method Automated    Aldolase    Collection Time: 01/28/19  4:00 AM   Result Value Ref Range    Aldolase 15.4 (H) 1.2 - 7.6 U/L   Immunoglobulins (IgG, IgA, IgM) Quantitative    Collection Time: 01/28/19  7:31 PM   Result Value Ref Range    IgG - Serum 932 650 - 1600 mg/dL    IgA 533 (H) 40 - 350 mg/dL    IgM 66 50 - 300 mg/dL   Gamma GT    Collection Time: 01/28/19  7:31 PM   Result Value Ref Range    GGT 29 8 - 55 U/L   Comprehensive Metabolic Panel (CMP)    Collection Time: 01/29/19  5:00 AM   Result Value Ref Range    Sodium 136 136 - 145 mmol/L    Potassium 3.6 3.5 - 5.1 mmol/L    Chloride 99 95 - 110 mmol/L    CO2 26 23 - 29 mmol/L    Glucose 115 (H) 70 - 110 mg/dL    BUN, Bld 19 6 - 20 mg/dL    Creatinine 0.6 0.5 - 1.4 mg/dL    Calcium 9.1 8.7 - 10.5 mg/dL    Total Protein 6.3 6.0 - 8.4 g/dL    Albumin 3.0 (L) 3.5 - 5.2 g/dL    Total Bilirubin 0.5 0.1 - 1.0 mg/dL    Alkaline Phosphatase 76 55 - 135 U/L     (H) 10 - 40 U/L    ALT 64 (H) 10 - 44 U/L    Anion Gap 11 8 - 16 mmol/L    eGFR if African American >60.0 >60 mL/min/1.73 m^2    eGFR if non African American >60.0 >60 mL/min/1.73 m^2   Magnesium    Collection Time: 01/29/19  5:00 AM   Result Value Ref Range    Magnesium 2.5 1.6 - 2.6 mg/dL   Phosphorus    Collection Time: 01/29/19  5:00 AM   Result Value Ref Range    Phosphorus 3.3 2.7 - 4.5 mg/dL   CK    Collection Time: 01/29/19  5:00 AM   Result Value Ref Range    CPK 1789 (H) 20 - 180 U/L   CBC auto differential    Collection Time: 01/29/19  5:00 AM   Result Value Ref Range    WBC 11.00 3.90 - 12.70 K/uL    RBC 3.81 (L) 4.00 - 5.40 M/uL    Hemoglobin 10.3 (L) 12.0 - 16.0 g/dL     Hematocrit 31.6 (L) 37.0 - 48.5 %    MCV 83 82 - 98 fL    MCH 27.0 27.0 - 31.0 pg    MCHC 32.6 32.0 - 36.0 g/dL    RDW 17.7 (H) 11.5 - 14.5 %    Platelets 192 150 - 350 K/uL    MPV 9.8 9.2 - 12.9 fL    Immature Granulocytes 1.0 (H) 0.0 - 0.5 %    Gran # (ANC) 9.4 (H) 1.8 - 7.7 K/uL    Immature Grans (Abs) 0.11 (H) 0.00 - 0.04 K/uL    Lymph # 0.8 (L) 1.0 - 4.8 K/uL    Mono # 0.7 0.3 - 1.0 K/uL    Eos # 0.0 0.0 - 0.5 K/uL    Baso # 0.01 0.00 - 0.20 K/uL    nRBC 0 0 /100 WBC    Gran% 85.1 (H) 38.0 - 73.0 %    Lymph% 7.2 (L) 18.0 - 48.0 %    Mono% 6.6 4.0 - 15.0 %    Eosinophil% 0.0 0.0 - 8.0 %    Basophil% 0.1 0.0 - 1.9 %    Differential Method Automated      Results for ROMEO PEREZ (MRN [id number]) as of 1/29/2019 11:35   Ref. Range 1/25/2019 04:20 1/26/2019 04:00 1/27/2019 04:21 1/28/2019 04:00 1/29/2019 05:00   CPK Latest Ref Range: 20 - 180 U/L 2224 (H) 1947 (H) 2076 (H) 2249 (H) 1789 (H)     Results for ROMEO PEREZ (MRN [id number]) as of 1/28/2019 10:23   Ref. Range 1/24/2019 03:40 1/25/2019 04:20 1/26/2019 04:00 1/27/2019 04:21 1/28/2019 04:00   Aldolase Latest Ref Range: 1.2 - 7.6 U/L 12.7 (H) 14.2 (H) 13.7 (H) 13.1 (H) 15.4 (H)   Results for ROMEO PEREZ (MRN [id number]) as of 1/28/2019 10:23   Ref. Range 1/22/2019 22:24   Sed Rate Latest Ref Range: 0 - 36 mm/Hr 50 (H)     Results for ROMEO PEREZ (MRN [id number]) as of 1/28/2019 10:23   Ref. Range 1/22/2019 22:24   CRP Latest Ref Range: 0.0 - 8.2 mg/L 31.1 (H)      Results for ROMEO PEREZ (MRN [id number]) as of 1/28/2019 10:23   Ref. Range 1/22/2019 22:24 1/25/2019 17:33   DARCY HEP-2 Titer Unknown Positive 1:640 Sp...    Anti-SSA Antibody Latest Ref Range: 0.00 - 19.99 EU 0.49    Anti-SSA Interpretation Latest Ref Range: Negative  Negative    Anti-SSB Antibody Latest Ref Range: 0.00 - 19.99 EU 0.11    Anti-SSB Interpretation Latest Ref Range: Negative  Negative    ds DNA Ab Latest Ref Range: Negative 1:10  Negative 1:10    Anti Sm Antibody  Latest Ref Range: 0.00 - 19.99 EU 0.58    Anti-Sm Interpretation Latest Ref Range: Negative  Negative    Anti Sm/RNP Antibody Latest Ref Range: 0.00 - 19.99 EU 0.62    Anti-Sm/RNP Interpretation Latest Ref Range: Negative  Negative    DARCY Screen Latest Ref Range: Negative <1:160  Positive (A)    Complement (C-3) Latest Ref Range: 50 - 180 mg/dL  106   Complement (C-4) Latest Ref Range: 11 - 44 mg/dL  29   Meg-1 Autoantibodies Latest Ref Range: <1.0 Index <1.00    Results for ROMEO PEREZ (MRN [id number]) as of 1/28/2019 10:23   Ref. Range 1/23/2019 02:55 1/23/2019 02:56   Specimen UA Unknown Urine, Clean Catch    Color, UA Latest Ref Range: Yellow, Straw, [place]  Colorless (A)    pH, UA Latest Ref Range: 5.0 - 8.0  6.0    Specific Gravity, UA Latest Ref Range: 1.005 - 1.030  1.005    Appearance, UA Latest Ref Range: Clear  Clear    Protein, UA Latest Ref Range: Negative  Negative    Glucose, UA Latest Ref Range: Negative  Negative    Ketones, UA Latest Ref Range: Negative  Negative    Occult Blood UA Latest Ref Range: Negative  Negative    Nitrite, UA Latest Ref Range: Negative  Negative    Bilirubin (UA) Latest Ref Range: Negative  Negative    Leukocytes, UA Latest Ref Range: Negative  Trace (A)    RBC, UA Latest Ref Range: 0 - 4 /hpf  2   WBC, UA Latest Ref Range: 0 - 5 /hpf  3   Squam Epithel, UA Latest Units: /hpf  0   Microscopic Comment Unknown  SEE COMMENT     Significant Imaging:  MRI Humerus w/ and w/o contrast:  Impression       1. Diffuse edema and enhancement of the visualized left upper extremity musculature, most pronounced proximally, most notably of the deltoid and biceps musculature as detailed above.  Findings are nonspecific although could relate to a nonspecific myelitis particularly in light of patient's reported history.  Clinical correlation with appropriate history and lab markers advised.  2. No evidence of abscess or osteomyelitis.  This report was flagged in Epic as abnormal.     NM PET  CT 12/27/18:  Impression       See above    Significant improvement in all the previously seen lymph nodes involving the left lower neck, supraclavicular region, axillary and retropectoral region.    Index left retropectoral SUV max 3.57, previously 27.2.     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 Biopsied.                        - Small hiatal hernia.                        - Normal stomach.                        - Normal examined duodenum.  Recommendation:       - Return patient to hospital to for ongoing care.                        - Await pathology results.                        - Use a proton pump inhibitor PO BID.                        - The findings and recommendations were discussed                         with the designated responsible adult.                        - Repeat upper endoscopy in 8 weeks to check                         healing.      Assessment/Plan:     * Myositis    57yo F with history of L sided infiltrating ductal carcinoma of the L breast (dx on Jan 2017), HTN, depression, and gastritis was send from hem/onc clinic for evaluation of possible inflammatory myositis.     Patient started on Atelizumab/Abraxane on 11/7/18. Per chart review, patient noticed rashes on the dorsum of her hands on 11/11/18. Seen in urgent care and given topical steroid cream. Then received two more infusions on Atelizumab/Abraxane on 11/21/18 and 12/5/18. Started to notice puffiness around the eyes on 12/11/18. Received another Abraxane infusion on 12/12/18 but this time with hydrocortisone 50 mg which helped reduce the swelling around the eyes. Developed swelling of the face again on 12/15/18. Next infusion of Abraxane done on 12/19/18 with Solucortef and Atelizumab held. Abraxane given again on 1/3/19 with no IV steroid. Patient developed swelling of the face on 1/4/19 and went to urgent care  "and given short course of prednisone 20 mg BID. On 1/15/19, patient seen in hem/onc clinic with c/o of pressure and tightness around neck. CT scan of chest and neck did not reveal any vascular compression. Started on prednisone 60 mg with taper. On 1/22/18 patient with c/o proximal muscle weakness. CPK found to be elevated around 4k. Patient admitted and given solumedrol 80 mg IV on 1/22/18. Last infusion of Atelizumab on 12/19/18. Last infusion of Abraxane on 1/3/19. Given Solumedrol 1g x 1 on 1/23/18. Seen by Dermatology on 1/24/18 and biopsy done of skin rash. Given distribution of rashes, there was concern for dermatomyositis. Patient started on Solumedrol 125 mg IV BID at this time.      Labs: ESR 50, CRP 31.1, CPK 4562 (trending down), Aldolase 13.6.  , ALT 64.  UA normal.  CBC unremarkable.  +DARCY 1:640 speckled with negative profile.  Complements normal. Normal GGT. RPR, HIV negative. Hep B/C negative    Case reports of docetaxel related inflammatory myositis had been documented. "...taxanes have been noted to cause disabling but transient arthralgia and myalgias; it is important to consider the possibility of inflammatory myopathy as a possible complication in patients undergoing treatment with these agents." - A case of docetaxel induced myositis and review of literature. Austin et al 2015.    Case reports in Rheumatology   Case reports of atezolizumb (immunomodulator) cause of myositis  - Lucy et al 2017 "Myositis as an adverse even of immune checkpoint blockade for cancer therapy.  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   Dermatomyositis can be associated with malignancy.  MRI of LUE showed edema of the deltoid and biceps.  Exam with proximal muscle weakness: b/l deltoids 4/5, b/l iliopsoas 4.4/5. 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     EGD: 1/29/19: erosive esophagitis, biopsies taken     Inpatient treatments so far:  - Solumedrol 80 mg IV x 1 on 1/22/19  - Solumedrol 1 g IV x 1 on 1/23/19.  - Solumedrol 125 mg IV BID since 1/24/19.     Plan:  - chemotherapy can be re-started as it may help treat Dermatomyositis. Would recommend against checkpoint inhibitor, however, as it may have been a trigger for the dermatomyositis  - continue to monitor CPK, aldolase, AST/ALT  - Decrease Solumderol to 75mg IV q12h.  - quantitative immunoglobulins wnl. Can start on IVIg 400 mg/kg daily x 5 days.  Therapy plan in place. Can be released by primary.   - MTX 15 mg sc weekly with daily folic acid.   - recommend ID fast track consult for vaccinations.  - f/u Quant TB, Strongyloides   - f/u esophageal biopsy results  - check PFTs (spirometry and lung volumes)   - start physical therapy.   - f/u myomarker panel and HMGCR   - f/u with Dr. Swift and Dr. Campos in Rheumatology at discharge.              Wilner Reynolds MD  Rheumatology  [facility]      Patient interviewed and examined with Dr. Reynolds and her note above reviewed. My note is below:  Dermatomyositis: No IgA deficiency so first dose of IVIg 400mg/kg daily x 5 days. Would suggest decreasing Solu-Medrol to 75mg IV q 12h, and will gradually continue tapering. Will wait on starting methotrexate until esophageal biopsies return to be sure no Candida esophagitis. Oral thrush improving with fluconazole daily. CK decreasing consistently.  PFTs pending  Physical Therapy  ID for vaccination update  QG-TB, Strongyloides

## 2019-01-29 NOTE — TRANSFER OF CARE
"Anesthesia Transfer of Care Note    Patient: Ermelinda Verde    Procedure(s) Performed: Procedure(s) (LRB):  EGD (ESOPHAGOGASTRODUODENOSCOPY) (N/A)    Patient location: [facility]    Anesthesia Type: general    Transport from OR: Transported from OR on room air with adequate spontaneous ventilation    Post pain: adequate analgesia    Post assessment: no apparent anesthetic complications    Post vital signs: stable    Level of consciousness: awake    Nausea/Vomiting: no nausea/vomiting    Complications: none    Transfer of care protocol was followed      Last vitals:   Visit Vitals  BP (!) 154/87 (BP Location: Left arm)   Pulse 97   Temp 36.8 °C (98.2 °F) (Temporal)   Resp 16   Ht 5' 5" (1.651 m)   Wt 79.9 kg (176 lb 0.6 oz)   LMP  (LMP Unknown)   SpO2 100%   Breastfeeding? No   BMI 29.29 kg/m²     "

## 2019-01-29 NOTE — PLAN OF CARE
Problem: SLP Goal  Goal: SLP Goal  Speech-Language Pathology Goals  Goals expected to be met by 2/2:   1) Pt will participate in ongoing swallow assessment to determine appropriateness of po diet.   2) Pt will participate in MBSS to further assess swallow function.  - met  3) Pt will participate in trial dysphagia tx in attempt to improve strength/safety  of swallow .  4) Pt / family education regarding dysphagia and aspiration risk.    Outcome: Ongoing (interventions implemented as appropriate)  Goals remain appropriate. Continue ST per POC. Teresa Rapp CCC-SLP 1/29/2019. 12:53 PM

## 2019-01-29 NOTE — CONSULTS
[facility]  Gastroenterology  Consult Note    Patient Name: Ermelinda Verde  MRN: [id number]  Admission Date: 1/22/2019  Hospital Length of Stay: 7 days  Code Status: Full Code   Attending Provider: Dale Dubon MD   Consulting Provider: Milagros Redd MD  Primary Care Physician: Reta Weeks MD  Principal Problem:Myositis    Inpatient consult to Gastroenterology  Consult performed by: Milagros Redd MD  Consult ordered by: Abigail Isaac MD        Subjective:     HPI:  Ermelinda Verde is a 57 y/o female with past history of breast CA s/p resection, and chemo and immunotherapy, and now with recurrence s/p immunotherapy, and started to have a rash ~1 month ago, followed by proximal weakness and dysphagia in the past 2 weeks. 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 Patient is being treated with IV steroids. The patient was noted to have oral thrush, and GI was consulted for EGD evaluation for candida esophagitis rule out.    The patient states that her dysphagia started around the time she had muscle weakness. She states that it started gradually to both solids and liquids, and she eventually started choking on liquids. She did have oral thrush that she was treating with nystatin. Fluconazole was started on 1/26, with improvement of thrush. She denies odynophagia. She notes her dysphagia seems to be overall improving, but she is not sure if it is due to anti-fungals or steroids. Denies use of anti-coagulation. Did not have anything to eat or drink since 1/25. Never had EGD evaluation. Never had swallowing issues in the past.    Past Medical History:   Diagnosis Date    Breast cancer 01/01/2017    left    Depression     Diverticulosis     Gastritis     Hypertension     Vitamin B12 deficiency 3/8/2018       Past Surgical History:   Procedure Laterality Date    BIOPSY-SENTINEL NODE Left 2/22/2017     Per[facility] at [facility] OR    BREAST BIOPSY Left     BREAST RECONSTRUCTION Left 2017     SECTION      COLONOSCOPY  2011    repeat in 10 yrs.    D&C      LZKJSSSRC-RWMD-H-CATH Right 10/31/2018    Performed by [facility] at [facility] OR 2ND FLR    MSQYZBSKK-OBVU-C-CATH Right 2017    Performed by [facility] at [facility] OR    OKENXZNYE-ACAI-G-CATH-neck or chest Fluoro needed Consent AM of surgery Right 10/30/2018    Performed by [facility] Palencia [facility] at [facility] OR 2ND FLR    MASTECTOMY Left 2017    MASTECTOMY Left 2017    Performed by Regina Rose MD at [facility] [place] OR    MYOMECTOMY      PORTACATH PLACEMENT      RECONSTRUCTION-BREAST/FLAP-SCOTT Left 2017    Performed by Sukhjinder Sewell MD at [facility] [place] OR    SENTINEL LYMPH NODE BIOPSY  2017    left    TOTAL REDUCTION MAMMOPLASTY Right 2017       Review of patient's allergies indicates:  No Known Allergies  Family History     Problem Relation (Age of Onset)    Breast cancer Sister (52)    Heart disease Father    Hypertension Father, Mother    No Known Problems Brother, Son, Brother, Brother, Sister, Daughter    Thyroid disease Daughter        Tobacco Use    Smoking status: Never Smoker    Smokeless tobacco: Never Used   Substance and Sexual Activity    Alcohol use: Yes     Comment: wine    Drug use: No    Sexual activity: Yes     Partners: Male     Birth control/protection: Post-menopausal     Review of Systems   Constitutional: Positive for activity change. Negative for appetite change, chills and fever.   HENT: Positive for trouble swallowing.    Respiratory: Negative for cough, choking, chest tightness and shortness of breath.    Cardiovascular: Negative for chest pain and leg swelling.   Gastrointestinal: Negative for abdominal distention, abdominal pain, anal bleeding, blood in stool, constipation, diarrhea, nausea, rectal pain and vomiting.   Genitourinary: Negative for difficulty urinating and  dysuria.   Musculoskeletal: Negative for arthralgias and back pain.   Skin: Negative for color change and pallor.   Neurological: Positive for dizziness and weakness. Negative for headaches.   Psychiatric/Behavioral: Negative for agitation and confusion.     Objective:     Vital Signs (Most Recent):  Temp: 98.2 °F (36.8 °C) (01/29/19 0738)  Pulse: 101 (01/29/19 0738)  Resp: 16 (01/29/19 0738)  BP: 119/68 (01/29/19 0738)  SpO2: 100 % (01/29/19 0738) Vital Signs (24h Range):  Temp:  [98 °F (36.7 °C)-98.3 °F (36.8 °C)] 98.2 °F (36.8 °C)  Pulse:  [] 101  Resp:  [16-18] 16  SpO2:  [97 %-100 %] 100 %  BP: (119-167)/(68-89) 119/68     Weight: 79.9 kg (176 lb 0.6 oz) (01/28/19 0400)  Body mass index is 29.29 kg/m².      Intake/Output Summary (Last 24 hours) at 1/29/2019 0848  Last data filed at 1/29/2019 0741  Gross per 24 hour   Intake 300 ml   Output 2400 ml   Net -2100 ml       Lines/Drains/Airways     Central Venous Catheter Line                 Port A Cath Single Lumen right subclavian -- days          Drain                 NG/OG Tube 01/28/19 1452 nasogastric;[place] sump 14 Fr. Right nostril less than 1 day                Physical Exam   Constitutional: She is oriented to person, place, and time. She appears well-developed and well-nourished. No distress.   HENT:   Head: Normocephalic.   Eyes: Conjunctivae are normal. No scleral icterus.   Neck: Normal range of motion. Neck supple.   Cardiovascular: Normal rate and regular rhythm.   Pulmonary/Chest: Effort normal and breath sounds normal.   Abdominal: Soft. Bowel sounds are normal. She exhibits no distension and no mass. There is no tenderness. There is no rebound and no guarding.   Musculoskeletal: Normal range of motion.   Neurological: She is alert and oriented to person, place, and time.   Muscle weakness.   Skin: Skin is warm and dry.   Psychiatric: She has a normal mood and affect.       Significant Labs:  CBC:   Recent Labs   Lab 01/28/19  0400  01/29/19  0500   WBC 10.95 11.00   HGB 10.4* 10.3*   HCT 32.0* 31.6*    192     BMP:   Recent Labs   Lab 01/29/19  0500   *      K 3.6   CL 99   CO2 26   BUN 19   CREATININE 0.6   CALCIUM 9.1   MG 2.5     CMP:   Recent Labs   Lab 01/29/19  0500   *   CALCIUM 9.1   ALBUMIN 3.0*   PROT 6.3      K 3.6   CO2 26   CL 99   BUN 19   CREATININE 0.6   ALKPHOS 76   ALT 64*   *   BILITOT 0.5     Coagulation: No results for input(s): PT, INR, APTT in the last 48 hours.  Lipase: No results for input(s): LIPASE in the last 48 hours.    Significant Imaging:  Imaging results within the past 24 hours have been reviewed.    Assessment/Plan:     Dysphagia    Patient is a 57 y/o female with dermatomyositis, with dysphagia, GI consulted for candida esophagitis rule out. Dysphagia due to muscle weakness seems more likely, particularly based on positive swallow evaluation for aspiration despite improvement of oral thrush. We will plan for EGD today to rule out the possibility of candida esophagitis.          Thank you for your consult. I will follow-up with patient. Please contact us if you have any additional questions.    Maurilio Redd MD  Gastroenterology  [facility]

## 2019-01-29 NOTE — ASSESSMENT & PLAN NOTE
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   - F/u 1/24 skin biopsy. Pending results may need muscle biopsy by general surgery  -MRI left humerus suspicious for myositis  -per rheum recs, methylprednisolone 125 mg IV BID.     1/29 - Per rheumatology  - Methylpred decreased to 100mg IV bid  - Starting MTX 15mg subQ qWeekly  - IVIG 400mg/kg x5 days  - PFTs, myomarker panel, HMGCR pending  - Quantitative IgA 533, IgG 932, IgM 66  - ID consult for fast track vacc

## 2019-01-29 NOTE — ASSESSMENT & PLAN NOTE
Patient is a 59 y/o female with dermatomyositis, with dysphagia, GI consulted for candida esophagitis rule out. Dysphagia due to muscle weakness seems more likely, particularly based on positive swallow evaluation for aspiration despite improvement of oral thrush. We will plan for EGD today to rule out the possibility of candida esophagitis.

## 2019-01-30 LAB
ALBUMIN SERPL BCP-MCNC: 2.8 G/DL
ALDOLASE SERPL-CCNC: 10.8 U/L
ALP SERPL-CCNC: 68 U/L
ALT SERPL W/O P-5'-P-CCNC: 57 U/L
ANION GAP SERPL CALC-SCNC: 6 MMOL/L
AST SERPL-CCNC: 135 U/L
BACTERIA THROAT CULT: NORMAL
BASOPHILS # BLD AUTO: 0.01 K/UL
BASOPHILS NFR BLD: 0.1 %
BILIRUB SERPL-MCNC: 0.6 MG/DL
BUN SERPL-MCNC: 24 MG/DL
CALCIUM SERPL-MCNC: 8.8 MG/DL
CHLORIDE SERPL-SCNC: 100 MMOL/L
CK SERPL-CCNC: 1424 U/L
CO2 SERPL-SCNC: 28 MMOL/L
CREAT SERPL-MCNC: 0.7 MG/DL
DIFFERENTIAL METHOD: ABNORMAL
EOSINOPHIL # BLD AUTO: 0 K/UL
EOSINOPHIL NFR BLD: 0 %
ERYTHROCYTE [DISTWIDTH] IN BLOOD BY AUTOMATED COUNT: 17.8 %
EST. GFR  (AFRICAN AMERICAN): >60 ML/MIN/1.73 M^2
EST. GFR  (NON AFRICAN AMERICAN): >60 ML/MIN/1.73 M^2
GLUCOSE SERPL-MCNC: 144 MG/DL
HCT VFR BLD AUTO: 29.6 %
HGB BLD-MCNC: 9.7 G/DL
IMM GRANULOCYTES # BLD AUTO: 0.09 K/UL
IMM GRANULOCYTES NFR BLD AUTO: 0.9 %
LYMPHOCYTES # BLD AUTO: 0.5 K/UL
LYMPHOCYTES NFR BLD: 4.7 %
MAGNESIUM SERPL-MCNC: 2.4 MG/DL
MCH RBC QN AUTO: 27.2 PG
MCHC RBC AUTO-ENTMCNC: 32.8 G/DL
MCV RBC AUTO: 83 FL
MONOCYTES # BLD AUTO: 0.5 K/UL
MONOCYTES NFR BLD: 5.3 %
NEUTROPHILS # BLD AUTO: 8.6 K/UL
NEUTROPHILS NFR BLD: 89 %
NRBC BLD-RTO: 0 /100 WBC
PHOSPHATE SERPL-MCNC: 3.2 MG/DL
PLATELET # BLD AUTO: 146 K/UL
PMV BLD AUTO: 9.4 FL
POTASSIUM SERPL-SCNC: 3.6 MMOL/L
PROT SERPL-MCNC: 6.7 G/DL
RBC # BLD AUTO: 3.56 M/UL
SODIUM SERPL-SCNC: 134 MMOL/L
STRONGYLOIDES ANTIBODY IGG: NEGATIVE
VARICELLA INTERPRETATION: POSITIVE
VARICELLA ZOSTER IGG: 2.71 ISR
WBC # BLD AUTO: 9.7 K/UL

## 2019-01-30 PROCEDURE — 99231 SBSQ HOSP IP/OBS SF/LOW 25: CPT | Mod: ,,, | Performed by: INTERNAL MEDICINE

## 2019-01-30 PROCEDURE — 97535 SELF CARE MNGMENT TRAINING: CPT

## 2019-01-30 PROCEDURE — 20600001 HC STEP DOWN PRIVATE ROOM

## 2019-01-30 PROCEDURE — 80053 COMPREHEN METABOLIC PANEL: CPT

## 2019-01-30 PROCEDURE — 92526 ORAL FUNCTION THERAPY: CPT

## 2019-01-30 PROCEDURE — 82550 ASSAY OF CK (CPK): CPT

## 2019-01-30 PROCEDURE — 99232 PR SUBSEQUENT HOSPITAL CARE,LEVL II: ICD-10-PCS | Mod: ,,, | Performed by: INTERNAL MEDICINE

## 2019-01-30 PROCEDURE — 85025 COMPLETE CBC W/AUTO DIFF WBC: CPT

## 2019-01-30 PROCEDURE — 83735 ASSAY OF MAGNESIUM: CPT

## 2019-01-30 PROCEDURE — 99232 SBSQ HOSP IP/OBS MODERATE 35: CPT | Mod: ,,, | Performed by: INTERNAL MEDICINE

## 2019-01-30 PROCEDURE — 86480 TB TEST CELL IMMUN MEASURE: CPT

## 2019-01-30 PROCEDURE — 36415 COLL VENOUS BLD VENIPUNCTURE: CPT

## 2019-01-30 PROCEDURE — 25000003 PHARM REV CODE 250: Performed by: STUDENT IN AN ORGANIZED HEALTH CARE EDUCATION/TRAINING PROGRAM

## 2019-01-30 PROCEDURE — 63600175 PHARM REV CODE 636 W HCPCS: Mod: JG | Performed by: INTERNAL MEDICINE

## 2019-01-30 PROCEDURE — 63600175 PHARM REV CODE 636 W HCPCS: Performed by: STUDENT IN AN ORGANIZED HEALTH CARE EDUCATION/TRAINING PROGRAM

## 2019-01-30 PROCEDURE — S0028 INJECTION, FAMOTIDINE, 20 MG: HCPCS | Performed by: STUDENT IN AN ORGANIZED HEALTH CARE EDUCATION/TRAINING PROGRAM

## 2019-01-30 PROCEDURE — 63600175 PHARM REV CODE 636 W HCPCS: Performed by: INTERNAL MEDICINE

## 2019-01-30 PROCEDURE — 25000003 PHARM REV CODE 250: Performed by: INTERNAL MEDICINE

## 2019-01-30 PROCEDURE — 99231 PR SUBSEQUENT HOSPITAL CARE,LEVL I: ICD-10-PCS | Mod: ,,, | Performed by: INTERNAL MEDICINE

## 2019-01-30 PROCEDURE — 84100 ASSAY OF PHOSPHORUS: CPT

## 2019-01-30 PROCEDURE — 82085 ASSAY OF ALDOLASE: CPT

## 2019-01-30 RX ORDER — ESCITALOPRAM OXALATE 5 MG/1
10 TABLET ORAL DAILY
Status: DISCONTINUED | OUTPATIENT
Start: 2019-01-30 | End: 2019-02-08

## 2019-01-30 RX ORDER — ACETAMINOPHEN 325 MG/1
650 TABLET ORAL
Status: COMPLETED | OUTPATIENT
Start: 2019-01-30 | End: 2019-02-02

## 2019-01-30 RX ORDER — FAMOTIDINE 20 MG/1
20 TABLET, FILM COATED ORAL 2 TIMES DAILY
Status: DISCONTINUED | OUTPATIENT
Start: 2019-01-30 | End: 2019-01-30

## 2019-01-30 RX ORDER — FAMOTIDINE 10 MG/ML
20 INJECTION INTRAVENOUS
Status: COMPLETED | OUTPATIENT
Start: 2019-01-30 | End: 2019-02-02

## 2019-01-30 RX ORDER — AMLODIPINE BESYLATE 10 MG/1
10 TABLET ORAL DAILY
Status: DISCONTINUED | OUTPATIENT
Start: 2019-01-30 | End: 2019-02-08

## 2019-01-30 RX ORDER — METHYLPREDNISOLONE SOD SUCC 125 MG
50 VIAL (EA) INJECTION EVERY 12 HOURS
Status: DISCONTINUED | OUTPATIENT
Start: 2019-01-30 | End: 2019-01-31

## 2019-01-30 RX ADMIN — ENOXAPARIN SODIUM 40 MG: 100 INJECTION SUBCUTANEOUS at 05:01

## 2019-01-30 RX ADMIN — HUMAN IMMUNOGLOBULIN G 30 G: 20 LIQUID INTRAVENOUS at 04:01

## 2019-01-30 RX ADMIN — FAMOTIDINE 20 MG: 20 TABLET ORAL at 08:01

## 2019-01-30 RX ADMIN — AMLODIPINE BESYLATE 10 MG: 10 TABLET ORAL at 03:01

## 2019-01-30 RX ADMIN — FAMOTIDINE 20 MG: 10 INJECTION, SOLUTION INTRAVENOUS at 03:01

## 2019-01-30 RX ADMIN — METHYLPREDNISOLONE SODIUM SUCCINATE 75 MG: 125 INJECTION, POWDER, FOR SOLUTION INTRAMUSCULAR; INTRAVENOUS at 08:01

## 2019-01-30 RX ADMIN — METHYLPREDNISOLONE SODIUM SUCCINATE 50 MG: 125 INJECTION, POWDER, FOR SOLUTION INTRAMUSCULAR; INTRAVENOUS at 09:01

## 2019-01-30 RX ADMIN — ACETAMINOPHEN 650 MG: 325 TABLET, FILM COATED ORAL at 03:01

## 2019-01-30 RX ADMIN — FLUCONAZOLE 200 MG: 200 TABLET ORAL at 03:01

## 2019-01-30 RX ADMIN — DIPHENHYDRAMINE HYDROCHLORIDE 50 MG: 50 INJECTION, SOLUTION INTRAMUSCULAR; INTRAVENOUS at 04:01

## 2019-01-30 RX ADMIN — ESCITALOPRAM OXALATE 10 MG: 5 TABLET, FILM COATED ORAL at 03:01

## 2019-01-30 RX ADMIN — FOLIC ACID 1 MG: 1 TABLET ORAL at 08:01

## 2019-01-30 NOTE — SUBJECTIVE & OBJECTIVE
Interval History: Patient seen at bedside. Strength improving as well as swallowing. Started on 5 day course of IVIG and MTX yesterday. Pending esophageal biopsy Remains NPO. No new rashes. No trouble breathing, f/c, n/v/d. Started on vaccinations.     Current Facility-Administered Medications   Medication Frequency    acetaminophen tablet 650 mg Q4H PRN    ALPRAZolam tablet 0.25 mg TID PRN    dextrose 50% injection 12.5 g PRN    dextrose 50% injection 25 g PRN    enoxaparin injection 40 mg Daily    famotidine (PF) injection 20 mg PRN    famotidine tablet 20 mg BID    fluconazole tablet 200 mg Q24H    folic acid tablet 1 mg Daily    glucagon (human recombinant) injection 1 mg PRN    glucose chewable tablet 16 g PRN    glucose chewable tablet 24 g PRN    hepatitis B (HEPLISAV-B) 20 mcg/0.5 mL vaccine 0.5 mL vaccine x 1 dose    hydrALAZINE injection 10 mg Q6H PRN    Immune Globulin G (IGG)-PRO-IGA 10 % injection (Privigen) 10 % injection 30 g Q24H    insulin aspart U-100 pen 0-5 Units QID (AC + HS) PRN    methylPREDNISolone sodium succinate injection 75 mg Q12H    ondansetron disintegrating tablet 8 mg Q8H PRN    oxyCODONE immediate release tablet 5 mg Q6H PRN    pneumoc 13-brandin conj-dip cr(PF) (PREVNAR 13 (PF)) 0.5 mL vaccine x 1 dose    polyethylene glycol packet 17 g BID PRN    promethazine tablet 12.5 mg Q6H PRN    sodium chloride 0.9% flush 10 mL PRN    sodium chloride 0.9% flush 5 mL PRN     Objective:     Vital Signs (Most Recent):  Temp: 98.7 °F (37.1 °C) (01/30/19 0820)  Pulse: 95 (01/30/19 0820)  Resp: 16 (01/30/19 0820)  BP: (!) 140/73 (01/30/19 0820)  SpO2: 98 % (01/30/19 0820)  O2 Device (Oxygen Therapy): room air (01/30/19 0820) Vital Signs (24h Range):  Temp:  [97.9 °F (36.6 °C)-99 °F (37.2 °C)] 98.7 °F (37.1 °C)  Pulse:  [78-97] 95  Resp:  [14-20] 16  SpO2:  [95 %-100 %] 98 %  BP: (132-161)/(73-96) 140/73     Weight: 78.8 kg (173 lb 12.8 oz) (01/30/19 0529)  Body mass index is  28.92 kg/m².  Body surface area is 1.9 meters squared.      Intake/Output Summary (Last 24 hours) at 1/30/2019 0840  Last data filed at 1/30/2019 0600  Gross per 24 hour   Intake 560 ml   Output 1400 ml   Net -840 ml       Physical Exam   Constitutional: She is oriented to person, place, and time and well-developed, well-nourished, and in no distress. No distress.   HENT:   Head: Normocephalic and atraumatic.   Right Ear: External ear normal.   Left Ear: External ear normal.   Bilateral orbital edema with heliotropic rash - resolved   Eyes: Conjunctivae and EOM are normal. Pupils are equal, round, and reactive to light.   Neck: Normal range of motion. Neck supple.   Cardiovascular: Normal rate, regular rhythm, normal heart sounds and intact distal pulses.    Pulmonary/Chest: Effort normal and breath sounds normal. No respiratory distress.   Abdominal: Soft. Bowel sounds are normal.       Right Side Rheumatological Exam     Muscle Strength (0-5 scale):  Neck Flexion:  4.8  Neck Extension: 5  Deltoid:  4.6  Biceps: 5/5   Triceps:  5  : 5/5   Iliopsoas: 4.6  Quadriceps:  5   Distal Lower Extremity: 5    Left Side Rheumatological Exam     Muscle Strength (0-5 scale):  Neck Flexion:  4.8  Neck Extension: 5  Deltoid:  4.6  Biceps: 5/5   Triceps:  5  :  5/5   Iliopsoas: 4.6  Quadriceps:  5   Distal Lower Extremity: 5      Neurological: She is alert and oriented to person, place, and time. GCS score is 15.   Skin: Skin is warm and dry. No rash noted.     Hyperpigmented non raised rash over nape of neck, elbows, knuckles (resemble Gottron's papules), thighs, and ankles.        Psychiatric: Mood, memory, affect and judgment normal.   Musculoskeletal: Normal range of motion. She exhibits edema. She exhibits no tenderness or deformity.   No signs of synovitis. ROM intact   Inability to move bilateral UE above 45 degrees simultaneously.  Able to move a little past 120 on each one alone           Significant Labs:  Recent  Results (from the past 48 hour(s))   HIV 1/2 Ag/Ab (4th Gen)    Collection Time: 01/28/19  7:31 PM   Result Value Ref Range    HIV 1/2 Ag/Ab Negative Negative   Hepatitis B surface antigen    Collection Time: 01/28/19  7:31 PM   Result Value Ref Range    Hepatitis B Surface Ag Negative    Hepatitis B core antibody, total    Collection Time: 01/28/19  7:31 PM   Result Value Ref Range    Hep B Core Total Ab Negative    Hepatitis B surface antibody    Collection Time: 01/28/19  7:31 PM   Result Value Ref Range    Hep B S Ab Negative    Hepatitis C antibody    Collection Time: 01/28/19  7:31 PM   Result Value Ref Range    Hepatitis C Ab Negative    Hepatitis A antibody, IgG    Collection Time: 01/28/19  7:31 PM   Result Value Ref Range    Hepatitis A Antibody IgG Positive (A)    RPR    Collection Time: 01/28/19  7:31 PM   Result Value Ref Range    RPR Non-reactive Non-reactive   Immunoglobulins (IgG, IgA, IgM) Quantitative    Collection Time: 01/28/19  7:31 PM   Result Value Ref Range    IgG - Serum 932 650 - 1600 mg/dL    IgA 533 (H) 40 - 350 mg/dL    IgM 66 50 - 300 mg/dL   Gamma GT    Collection Time: 01/28/19  7:31 PM   Result Value Ref Range    GGT 29 8 - 55 U/L   Comprehensive Metabolic Panel (CMP)    Collection Time: 01/29/19  5:00 AM   Result Value Ref Range    Sodium 136 136 - 145 mmol/L    Potassium 3.6 3.5 - 5.1 mmol/L    Chloride 99 95 - 110 mmol/L    CO2 26 23 - 29 mmol/L    Glucose 115 (H) 70 - 110 mg/dL    BUN, Bld 19 6 - 20 mg/dL    Creatinine 0.6 0.5 - 1.4 mg/dL    Calcium 9.1 8.7 - 10.5 mg/dL    Total Protein 6.3 6.0 - 8.4 g/dL    Albumin 3.0 (L) 3.5 - 5.2 g/dL    Total Bilirubin 0.5 0.1 - 1.0 mg/dL    Alkaline Phosphatase 76 55 - 135 U/L     (H) 10 - 40 U/L    ALT 64 (H) 10 - 44 U/L    Anion Gap 11 8 - 16 mmol/L    eGFR if African American >60.0 >60 mL/min/1.73 m^2    eGFR if non African American >60.0 >60 mL/min/1.73 m^2   Magnesium    Collection Time: 01/29/19  5:00 AM   Result Value Ref  Range    Magnesium 2.5 1.6 - 2.6 mg/dL   Phosphorus    Collection Time: 01/29/19  5:00 AM   Result Value Ref Range    Phosphorus 3.3 2.7 - 4.5 mg/dL   CK    Collection Time: 01/29/19  5:00 AM   Result Value Ref Range    CPK 1789 (H) 20 - 180 U/L   CBC auto differential    Collection Time: 01/29/19  5:00 AM   Result Value Ref Range    WBC 11.00 3.90 - 12.70 K/uL    RBC 3.81 (L) 4.00 - 5.40 M/uL    Hemoglobin 10.3 (L) 12.0 - 16.0 g/dL    Hematocrit 31.6 (L) 37.0 - 48.5 %    MCV 83 82 - 98 fL    MCH 27.0 27.0 - 31.0 pg    MCHC 32.6 32.0 - 36.0 g/dL    RDW 17.7 (H) 11.5 - 14.5 %    Platelets 192 150 - 350 K/uL    MPV 9.8 9.2 - 12.9 fL    Immature Granulocytes 1.0 (H) 0.0 - 0.5 %    Gran # (ANC) 9.4 (H) 1.8 - 7.7 K/uL    Immature Grans (Abs) 0.11 (H) 0.00 - 0.04 K/uL    Lymph # 0.8 (L) 1.0 - 4.8 K/uL    Mono # 0.7 0.3 - 1.0 K/uL    Eos # 0.0 0.0 - 0.5 K/uL    Baso # 0.01 0.00 - 0.20 K/uL    nRBC 0 0 /100 WBC    Gran% 85.1 (H) 38.0 - 73.0 %    Lymph% 7.2 (L) 18.0 - 48.0 %    Mono% 6.6 4.0 - 15.0 %    Eosinophil% 0.0 0.0 - 8.0 %    Basophil% 0.1 0.0 - 1.9 %    Differential Method Automated    Aldolase    Collection Time: 01/29/19  5:00 AM   Result Value Ref Range    Aldolase 10.8 (H) 1.2 - 7.6 U/L   Comprehensive Metabolic Panel (CMP)    Collection Time: 01/30/19  5:45 AM   Result Value Ref Range    Sodium 134 (L) 136 - 145 mmol/L    Potassium 3.6 3.5 - 5.1 mmol/L    Chloride 100 95 - 110 mmol/L    CO2 28 23 - 29 mmol/L    Glucose 144 (H) 70 - 110 mg/dL    BUN, Bld 24 (H) 6 - 20 mg/dL    Creatinine 0.7 0.5 - 1.4 mg/dL    Calcium 8.8 8.7 - 10.5 mg/dL    Total Protein 6.7 6.0 - 8.4 g/dL    Albumin 2.8 (L) 3.5 - 5.2 g/dL    Total Bilirubin 0.6 0.1 - 1.0 mg/dL    Alkaline Phosphatase 68 55 - 135 U/L     (H) 10 - 40 U/L    ALT 57 (H) 10 - 44 U/L    Anion Gap 6 (L) 8 - 16 mmol/L    eGFR if African American >60.0 >60 mL/min/1.73 m^2    eGFR if non African American >60.0 >60 mL/min/1.73 m^2   Magnesium    Collection Time:  01/30/19  5:45 AM   Result Value Ref Range    Magnesium 2.4 1.6 - 2.6 mg/dL   Phosphorus    Collection Time: 01/30/19  5:45 AM   Result Value Ref Range    Phosphorus 3.2 2.7 - 4.5 mg/dL   CK    Collection Time: 01/30/19  5:45 AM   Result Value Ref Range    CPK 1424 (H) 20 - 180 U/L   CBC auto differential    Collection Time: 01/30/19  5:45 AM   Result Value Ref Range    WBC 9.70 3.90 - 12.70 K/uL    RBC 3.56 (L) 4.00 - 5.40 M/uL    Hemoglobin 9.7 (L) 12.0 - 16.0 g/dL    Hematocrit 29.6 (L) 37.0 - 48.5 %    MCV 83 82 - 98 fL    MCH 27.2 27.0 - 31.0 pg    MCHC 32.8 32.0 - 36.0 g/dL    RDW 17.8 (H) 11.5 - 14.5 %    Platelets 146 (L) 150 - 350 K/uL    MPV 9.4 9.2 - 12.9 fL    Immature Granulocytes 0.9 (H) 0.0 - 0.5 %    Gran # (ANC) 8.6 (H) 1.8 - 7.7 K/uL    Immature Grans (Abs) 0.09 (H) 0.00 - 0.04 K/uL    Lymph # 0.5 (L) 1.0 - 4.8 K/uL    Mono # 0.5 0.3 - 1.0 K/uL    Eos # 0.0 0.0 - 0.5 K/uL    Baso # 0.01 0.00 - 0.20 K/uL    nRBC 0 0 /100 WBC    Gran% 89.0 (H) 38.0 - 73.0 %    Lymph% 4.7 (L) 18.0 - 48.0 %    Mono% 5.3 4.0 - 15.0 %    Eosinophil% 0.0 0.0 - 8.0 %    Basophil% 0.1 0.0 - 1.9 %    Differential Method Automated    Results for ROMEO PEREZ (MRN [id number]) as of 1/30/2019 08:39   Ref. Range 1/26/2019 04:00 1/27/2019 04:21 1/28/2019 04:00 1/29/2019 05:00 1/30/2019 05:45   CPK Latest Ref Range: 20 - 180 U/L 1947 (H) 2076 (H) 2249 (H) 1789 (H) 1424 (H)       Results for ROMEO PEREZ (MRN [id number]) as of 1/28/2019 10:23   Ref. Range 1/24/2019 03:40 1/25/2019 04:20 1/26/2019 04:00 1/27/2019 04:21 1/28/2019 04:00   Aldolase Latest Ref Range: 1.2 - 7.6 U/L 12.7 (H) 14.2 (H) 13.7 (H) 13.1 (H) 15.4 (H)   Results for ROMEO PEREZ (MRN [id number]) as of 1/28/2019 10:23   Ref. Range 1/22/2019 22:24   Sed Rate Latest Ref Range: 0 - 36 mm/Hr 50 (H)     Results for ROMEO PEREZ (MRN [id number]) as of 1/28/2019 10:23   Ref. Range 1/22/2019 22:24   CRP Latest Ref Range: 0.0 - 8.2 mg/L 31.1 (H)      Results for  ROMEO PEREZ (MRN [id number]) as of 1/28/2019 10:23   Ref. Range 1/22/2019 22:24 1/25/2019 17:33   DARCY HEP-2 Titer Unknown Positive 1:640 Sp...    Anti-SSA Antibody Latest Ref Range: 0.00 - 19.99 EU 0.49    Anti-SSA Interpretation Latest Ref Range: Negative  Negative    Anti-SSB Antibody Latest Ref Range: 0.00 - 19.99 EU 0.11    Anti-SSB Interpretation Latest Ref Range: Negative  Negative    ds DNA Ab Latest Ref Range: Negative 1:10  Negative 1:10    Anti Sm Antibody Latest Ref Range: 0.00 - 19.99 EU 0.58    Anti-Sm Interpretation Latest Ref Range: Negative  Negative    Anti Sm/RNP Antibody Latest Ref Range: 0.00 - 19.99 EU 0.62    Anti-Sm/RNP Interpretation Latest Ref Range: Negative  Negative    DARCY Screen Latest Ref Range: Negative <1:160  Positive (A)    Complement (C-3) Latest Ref Range: 50 - 180 mg/dL  106   Complement (C-4) Latest Ref Range: 11 - 44 mg/dL  29   Meg-1 Autoantibodies Latest Ref Range: <1.0 Index <1.00    Results for ROMEO PEREZ (MRN [id number]) as of 1/28/2019 10:23   Ref. Range 1/23/2019 02:55 1/23/2019 02:56   Specimen UA Unknown Urine, Clean Catch    Color, UA Latest Ref Range: Yellow, Straw, [place]  Colorless (A)    pH, UA Latest Ref Range: 5.0 - 8.0  6.0    Specific Gravity, UA Latest Ref Range: 1.005 - 1.030  1.005    Appearance, UA Latest Ref Range: Clear  Clear    Protein, UA Latest Ref Range: Negative  Negative    Glucose, UA Latest Ref Range: Negative  Negative    Ketones, UA Latest Ref Range: Negative  Negative    Occult Blood UA Latest Ref Range: Negative  Negative    Nitrite, UA Latest Ref Range: Negative  Negative    Bilirubin (UA) Latest Ref Range: Negative  Negative    Leukocytes, UA Latest Ref Range: Negative  Trace (A)    RBC, UA Latest Ref Range: 0 - 4 /hpf  2   WBC, UA Latest Ref Range: 0 - 5 /hpf  3   Squam Epithel, UA Latest Units: /hpf  0   Microscopic Comment Unknown  SEE COMMENT     Significant Imaging:  MRI Humerus w/ and w/o contrast:  Impression       1. Diffuse  edema and enhancement of the visualized left upper extremity musculature, most pronounced proximally, most notably of the deltoid and biceps musculature as detailed above.  Findings are nonspecific although could relate to a nonspecific myelitis particularly in light of patient's reported history.  Clinical correlation with appropriate history and lab markers advised.  2. No evidence of abscess or osteomyelitis.  This report was flagged in Epic as abnormal.     NM PET CT 12/27/18:  Impression       See above    Significant improvement in all the previously seen lymph nodes involving the left lower neck, supraclavicular region, axillary and retropectoral region.    Index left retropectoral SUV max 3.57, previously 27.2.     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 Biopsied.                        - Small hiatal hernia.                        - Normal stomach.                        - Normal examined duodenum.  Recommendation:       - Return patient to hospital to for ongoing care.                        - Await pathology results.                        - Use a proton pump inhibitor PO BID.                        - The findings and recommendations were discussed                         with the designated responsible adult.                        - Repeat upper endoscopy in 8 weeks to check                         healing.

## 2019-01-30 NOTE — ASSESSMENT & PLAN NOTE
"57yo F with history of L sided infiltrating ductal carcinoma of the L breast (dx on Jan 2017), HTN, depression, and gastritis was send from hem/onc clinic for evaluation of possible inflammatory myositis.     Patient started on Atelizumab/Abraxane on 11/7/18. Per chart review, patient noticed rashes on the dorsum of her hands on 11/11/18. Seen in urgent care and given topical steroid cream. Then received two more infusions on Atelizumab/Abraxane on 11/21/18 and 12/5/18. Started to notice puffiness around the eyes on 12/11/18. Received another Abraxane infusion on 12/12/18 but this time with hydrocortisone 50 mg which helped reduce the swelling around the eyes. Developed swelling of the face again on 12/15/18. Next infusion of Abraxane done on 12/19/18 with Solucortef and Atelizumab held. Abraxane given again on 1/3/19 with no IV steroid. Patient developed swelling of the face on 1/4/19 and went to urgent care and given short course of prednisone 20 mg BID. On 1/15/19, patient seen in hem/onc clinic with c/o of pressure and tightness around neck. CT scan of chest and neck did not reveal any vascular compression. Started on prednisone 60 mg with taper. On 1/22/18 patient with c/o proximal muscle weakness. CPK found to be elevated around 4k. Patient admitted and given solumedrol 80 mg IV on 1/22/18. Last infusion of Atelizumab on 12/19/18. Last infusion of Abraxane on 1/3/19. Given Solumedrol 1g x 1 on 1/23/18. Seen by Dermatology on 1/24/18 and biopsy done of skin rash. Given distribution of rashes, there was concern for dermatomyositis. Patient started on Solumedrol 125 mg IV BID at this time.      Labs: ESR 50, CRP 31.1, CPK 4562 (trending down), Aldolase 13.6.  , ALT 64.  UA normal.  CBC unremarkable.  +DARCY 1:640 speckled with negative profile.  Complements normal. Normal GGT. RPR, HIV negative. Hep B/C negative.    Case reports of docetaxel related inflammatory myositis had been documented. "...taxanes have " "been noted to cause disabling but transient arthralgia and myalgias; it is important to consider the possibility of inflammatory myopathy as a possible complication in patients undergoing treatment with these agents." - A case of docetaxel induced myositis and review of literature. Austin et al 2015.    Case reports in Rheumatology   Case reports of atezolizumb (immunomodulator) cause of myositis  - Lucy et al 2017 "Myositis as an adverse even of immune checkpoint blockade for cancer therapy.  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   Dermatomyositis can be associated with malignancy.  MRI of LUE showed edema of the deltoid and biceps.  Exam with proximal muscle weakness: b/l deltoids 4/5, b/l iliopsoas 4.4/5. 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      Inpatient treatments so far:  - Solumedrol 80 mg IV x 1 on 1/22/19  - Solumedrol 1 g IV x 1 on 1/23/19.  - Solumedrol 125 mg IV BID since 1/24/19.     Plan:  - chemotherapy can be re-started as it may help treat Dermatomyositis. Would recommend against checkpoint inhibitor, however, as it may have been a trigger for the dermatomyositis  - continue to monitor CPK, aldolase, AST/ALT  - Decrease Solumderol to 50mg IV q12h.  - quantitative immunoglobulins wnl. Can start on IVIg 400 mg/kg daily x 5 days.  Therapy plan in place. Can be released by primary.   - MTX 15 mg sc weekly with daily folic acid.   - recommend ID fast track consult for vaccinations.  - f/u Quant TB, Strongyloides   - f/u esophageal biopsy results  - check PFTs (spirometry and lung volumes)   - start physical therapy.   - f/u myomarker panel and HMGCR   - f/u with Dr. Swift and Dr." Andres in Rheumatology at discharge.

## 2019-01-30 NOTE — ASSESSMENT & PLAN NOTE
Patient is experiencing difficulty swallowing that has been increasing in severity over last couple of days to the point where patient did not feel as if she could swallow.  As of 01/26, SLP evaluated patient and determined that she should be NPO except for medications until a modified barium swallow could be performed. Possibly candidal esophagitis; patient has oral thrush.    Plan:  - High aspiration risk on modified barium study, strict NPO  - NGT in situ, tube feeds initiated per dietary consult  - Switched to IV meds where possible  - Fluconazole IVPB 200mg q24hr  - GI consulted, EGD pending today to r/o candidal esophagitis vs dermatomyositis motility dysfunction --> resulted w/ Grade C erosive esophagitis, no candidal infxn identified. Bx obtained, GI recs start PPI bid

## 2019-01-30 NOTE — PROGRESS NOTES
[facility]  Hematology/Oncology  Progress Note    Patient Name: Ermelinda Verde  Admission Date: 1/22/2019  Hospital Length of Stay: 8 days  Code Status: Full Code     Subjective:     HPI:  Ms Verde is a 57 yo F with a prior diagnosis of L sided breast cancer, s/p 3 cycles of nab-paclitaxel and atezolizumab who presents from clinic with a roughly week long, multi-day history of proximal muscle weakness in the shoulder girdle muscles, bilateral and associated with mild tenderness. She reports generalized weakness, but strength impairment with the use of her upper extremities more than lower extremities, and her ADLs are intact. Her last PET scan in December 2018 showed almost complete resolution of her adenopathy, and she had been complaining of a rash, which had been attributed to Nab paclitaxel. Last week, 1/15, she had a CT neck/ since there was concern for facial swelling per symptoms, and the CT showed lymphadenopathy without airway or vascular compromise. 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 Most recently, the patient had been on 60mg of prednisone with a taper and had noted swelling, proximal weakness. She also notes some progressive swallowing difficulty, but attributes this to candidal thrush for which she takes nystatin scheduled. She reports poor PO intake preceding admission, dark, mario colored urine, but denies fevers or joint pain.      Onc History per Dr. Reyna:   She developed a palpable abnormality in her left breast in January 2017 which she noted on self-examination.  A diagnostic mammogram on January 19 showed a greater than 1 cm nodule in the upper outer portion of left breast.  By ultrasound this was lobulated and hypoechoic measuring 1.75 x 1.51 x 1.96 cm.     On January 24,  2017 a core needle biopsy was performed which showed infiltrating ductal carcinoma, high grade.  The tumor was ER negative, PA negative, and HER-2 negative.  A follow-up ultrasound on December 6 showed 2.5 x 2.2 x 1.5 cm left breast mass.  There was no abnormality noted in the left axilla.     She underwent sentinel lymph node biopsy on February 22.  That showed 4 negative lymph nodes.     She had 4 cycles of  Wilbur-adjuvantTaxotere and Cytoxan completed  on 5/9/17.     On June 26 she underwent left mastectomy.  That revealed 2 foci of invasive high-grade carcinoma measuring 14 mm and 1.5 mm.  Margins were negative.     She completed 4 cycles of adjuvant Adriamycin on September 12, 2017.     In October, she developed some left supraclavicular lymphadenopathy which turned out to be recurrence.     A fine-needle aspirate of the lymph node was performed on October 19th.  That showed metastatic carcinoma consistent with breast primary which was ER negative, PA negative and HER 2-negative.           Interval History: Endorses improving strength and tenderness resolved. NAEO Patient c/o throat irritation 2/2 NGT with continued dysphagia    DISPO: Medically ready for discharge, pending placement to rehab vs SNF 1/30/2019    Oncology Treatment Plan:   OP BREAST DOCETAXEL Q3W    Medications:  Continuous Infusions:    Scheduled Meds:   enoxaparin  40 mg Subcutaneous Daily    famotidine  20 mg Per NG tube BID    fluconazole  200 mg Per NG tube Q24H    folic acid  1 mg Oral Daily    Immune Globulin G (IGG)-PRO-IGA 10 % injection (Privigen)  400 mg/kg/day Intravenous Q24H    methylPREDNISolone sodium succinate  75 mg Intravenous Q12H     PRN Meds:acetaminophen, ALPRAZolam, dextrose 50%, dextrose 50%, famotidine (PF), glucagon (human recombinant), glucose, glucose, hepatitis B, hydrALAZINE, insulin aspart U-100, ondansetron, oxyCODONE, pneumoc 13-brandin conj-dip cr(PF), polyethylene glycol, promethazine, sodium chloride 0.9%,  sodium chloride 0.9%     Review of Systems   Constitutional: Positive for fatigue. Negative for activity change, appetite change, chills, diaphoresis, fever and unexpected weight change.   HENT: Positive for facial swelling and trouble swallowing. Negative for congestion, ear discharge, hearing loss, postnasal drip, sinus pressure, sneezing, sore throat and tinnitus.    Eyes: Negative for photophobia, pain and redness.   Respiratory: Negative for apnea, cough, choking, chest tightness, shortness of breath and stridor.    Cardiovascular: Negative for chest pain, palpitations and leg swelling.   Gastrointestinal: Negative for abdominal pain, anal bleeding, constipation, diarrhea, nausea and rectal pain.   Endocrine: Negative for polyuria.   Genitourinary: Negative for dysuria and hematuria.   Musculoskeletal: Positive for myalgias. Negative for arthralgias, back pain, gait problem, joint swelling, neck pain and neck stiffness.   Skin: Positive for rash. Negative for color change and pallor.   Allergic/Immunologic: Negative for immunocompromised state.   Neurological: Positive for weakness. Negative for dizziness, seizures and numbness.   Hematological: Positive for adenopathy. Does not bruise/bleed easily.   Psychiatric/Behavioral: Negative for agitation and behavioral problems.     Objective:     Vital Signs (Most Recent):  Temp: 98.7 °F (37.1 °C) (01/30/19 0820)  Pulse: 95 (01/30/19 0820)  Resp: 16 (01/30/19 0820)  BP: (!) 140/73 (01/30/19 0820)  SpO2: 98 % (01/30/19 0820) Vital Signs (24h Range):  Temp:  [97.9 °F (36.6 °C)-99 °F (37.2 °C)] 98.7 °F (37.1 °C)  Pulse:  [78-97] 95  Resp:  [14-20] 16  SpO2:  [95 %-100 %] 98 %  BP: (132-161)/(73-96) 140/73     Weight: 78.8 kg (173 lb 12.8 oz)  Body mass index is 28.92 kg/m².  Body surface area is 1.9 meters squared.      Intake/Output Summary (Last 24 hours) at 1/30/2019 0834  Last data filed at 1/30/2019 0600  Gross per 24 hour   Intake 560 ml   Output 1400 ml   Net  -840 ml       Physical Exam   Constitutional: She is oriented to person, place, and time. She appears well-developed and well-nourished. No distress.   HENT:   Head: Atraumatic.   Nose: Nose normal.   Mouth/Throat: No oropharyngeal exudate (white tongue / exudate mostly resolved).   Face appears swollen   Eyes: Conjunctivae and EOM are normal. Pupils are equal, round, and reactive to light. Right eye exhibits no discharge. Left eye exhibits no discharge. No scleral icterus.   Neck: Normal range of motion. Neck supple. No tracheal deviation present.   Cardiovascular: Normal rate, regular rhythm and intact distal pulses. Exam reveals no gallop and no friction rub.   Pulmonary/Chest: Effort normal and breath sounds normal. No respiratory distress. She has no wheezes. She has no rales. She exhibits no tenderness.   Abdominal: Soft. Bowel sounds are normal. She exhibits no distension and no mass. There is no tenderness. There is no rebound and no guarding.   Musculoskeletal: Normal range of motion. She exhibits edema (facial edema, upper extremity edema b/l). She exhibits no tenderness or deformity.   Neurological: She is alert and oriented to person, place, and time. No cranial nerve deficit or sensory deficit.     4/5 shoulder strength on shoulder abduction; improving  5/5 flexion and extension preserved at elbow   Skin: Skin is warm and dry. Capillary refill takes less than 2 seconds. No rash noted. She is not diaphoretic. No erythema. No pallor.   Psychiatric: She has a normal mood and affect.       Significant Labs:   CBC:   Recent Labs   Lab 01/29/19  0500 01/30/19  0545   WBC 11.00 9.70   HGB 10.3* 9.7*   HCT 31.6* 29.6*    146*    and CMP:   Recent Labs   Lab 01/29/19  0500 01/30/19  0545    134*   K 3.6 3.6   CL 99 100   CO2 26 28   * 144*   BUN 19 24*   CREATININE 0.6 0.7   CALCIUM 9.1 8.8   PROT 6.3 6.7   ALBUMIN 3.0* 2.8*   BILITOT 0.5 0.6   ALKPHOS 76 68   * 135*   ALT 64* 57*    ANIONGAP 11 6*   EGFRNONAA >60.0 >60.0       Diagnostic Results:  I have reviewed and interpreted all pertinent imaging results/findings within the past 24 hours.    Assessment/Plan:     * Myositis    - DDX dermatomyositis de haylie vs related to immunotherapy  - trend CPK daily; downtrending generally with a slight bump on 01/27  -restarted fluids 01/26 due to NPO  - myositis labs suspicious for myosits; rheum on board.   - F/u 1/24 skin biopsy. Pending results may need muscle biopsy by general surgery  -MRI left humerus suspicious for myositis    Per rheumatology  - Methylpred 125mg IV bid started, now decreased to 75mg IV bid  - Starting MTX 15mg subQ qWeekly  - IVIG 400mg/kg x5 days  - PFTs, myomarker panel, HMGCR, QG-TB pending  - Quantitative IgA 533, IgG 932, IgM 66  - ID consult for fast track vacc         Hypokalemia    Replete prn     Dysphagia    Patient is experiencing difficulty swallowing that has been increasing in severity over last couple of days to the point where patient did not feel as if she could swallow.  As of 01/26, SLP evaluated patient and determined that she should be NPO except for medications until a modified barium swallow could be performed. Possibly candidal esophagitis; patient has oral thrush.    Plan:  - High aspiration risk on modified barium study, strict NPO  - NGT in situ, tube feeds initiated per dietary consult  - Switched to IV meds where possible  - Fluconazole IVPB 200mg q24hr  - GI consulted, EGD pending today to r/o candidal esophagitis vs dermatomyositis motility dysfunction --> resulted w/ Grade C erosive esophagitis, no candidal infxn identified. Bx obtained, GI recs start PPI bid     Swelling of upper arm    -B/L U/S negative for DVTs  -appears less edematous than day prior  -continue to monitor     Dermatitis    - Skin biopsy from 1/24 pending, appreciate dermatology recs     Candidiasis    -cont Nystatin swish and swallow. Additionally, added fluconaozle 200 mg Po  qday on 01/26 as possibility patient is experiencing esophagitis 2/2 to candida  -appears to be improving with fluconazole     Rhabdomyolysis    -see myositis. Continue supportive care, Cr/ electrolyte/ CPK monitoring and aggressive hydration. transaminitis on CMP is likely due to skeletal muscle rhabdo, not hepatic     Drug rash    - Unclear if symptoms related to chemotherapy     Pre-diabetes    -check a1c, 0-5u SSI while on steroids, add prandial insulin as warranted.      Adjustment disorder with depressed mood    -cont home SSRI     Vitamin B12 deficiency    -cont home B12 supplementation      Breast cancer of upper-outer quadrant of left female breast    -On January 24, 2017 a core needle biopsy was performed which showed infiltrating ductal carcinoma, high grade.  The tumor was ER negative, KY negative, and HER-2 negative.  -She had 4 cycles of  Wilbur-adjuvantTaxotere and Cytoxan completed  on 5/9/17.  -On June 26 she underwent left mastectomy.  That revealed 2 foci of invasive high-grade carcinoma measuring 14 mm and 1.5 mm.  Margins were negative.  -She completed 4 cycles of adjuvant Adriamycin on September 12, 2017.  -  A fine-needle aspirate of the lymph node was performed on October 19th.  That showed metastatic carcinoma consistent with breast primary which was ER negative, KY negative and HER 2-negative.    -Per rheumatology: hold paclitaxel agent and atezolizumb at this time - both can cause myositis      Hypertension    -on home amlodipine-benazepril; will hold ACEi for now and observe renal function with CPK elevation in rhabdo.     Plan:  - Amlodipine 10 mg qday on admission  - 1/28 Pt now strict NPO due to failed swallow study, now on hydralazine 10mg IV q8h WCTM and adjust PRN              Noah Patel MD  Hematology/Oncology  [facility]

## 2019-01-30 NOTE — PLAN OF CARE
"Problem: Adult Inpatient Plan of Care  Goal: Plan of Care Review  Outcome: Ongoing (interventions implemented as appropriate)  Pt involved in plan of care and communicating needs throughout shift.  Up in room independently; generalized weakness noted; pt instructed to call for assistance as needed. No c/o pain or discomfort today.  Pt remaining NPO with ice chips as ordered; tube feedings infusing as ordered; advanced by 10ml/hr at 10am to 40ml/hr; pt c/o feeling "full and distended" at 1600; residual noted to be 60ml at that time; per pt request; tube feeds held at that time; will resume at 40ml/hr per pt request and continue to advance as tolerated.  Pt is voiding without difficulty; had 2 loose stools this am.  PRN meds admin for.   Dose #2/5 IVIG infusing this evening.  Pt tolerating well so far.  All VSS; no acute events so far this shift.  Pt remaining free from falls or injury throughout shift; bed in lowest position; call light within reach.  Pt instructed to call for assistance as needed.  Q1H rounding done on pt.        "

## 2019-01-30 NOTE — SUBJECTIVE & OBJECTIVE
Interval History: Endorses improving strength and tenderness resolved. NAEO Patient c/o throat irritation 2/2 NGT with continued dysphagia    Oncology Treatment Plan:   OP BREAST DOCETAXEL Q3W    Medications:  Continuous Infusions:    Scheduled Meds:   enoxaparin  40 mg Subcutaneous Daily    famotidine  20 mg Per NG tube BID    fluconazole  200 mg Per NG tube Q24H    folic acid  1 mg Oral Daily    Immune Globulin G (IGG)-PRO-IGA 10 % injection (Privigen)  400 mg/kg/day Intravenous Q24H    methylPREDNISolone sodium succinate  75 mg Intravenous Q12H     PRN Meds:acetaminophen, ALPRAZolam, dextrose 50%, dextrose 50%, famotidine (PF), glucagon (human recombinant), glucose, glucose, hepatitis B, hydrALAZINE, insulin aspart U-100, ondansetron, oxyCODONE, pneumoc 13-brandin conj-dip cr(PF), polyethylene glycol, promethazine, sodium chloride 0.9%, sodium chloride 0.9%     Review of Systems   Constitutional: Positive for fatigue. Negative for activity change, appetite change, chills, diaphoresis, fever and unexpected weight change.   HENT: Positive for facial swelling and trouble swallowing. Negative for congestion, ear discharge, hearing loss, postnasal drip, sinus pressure, sneezing, sore throat and tinnitus.    Eyes: Negative for photophobia, pain and redness.   Respiratory: Negative for apnea, cough, choking, chest tightness, shortness of breath and stridor.    Cardiovascular: Negative for chest pain, palpitations and leg swelling.   Gastrointestinal: Negative for abdominal pain, anal bleeding, constipation, diarrhea, nausea and rectal pain.   Endocrine: Negative for polyuria.   Genitourinary: Negative for dysuria and hematuria.   Musculoskeletal: Positive for myalgias. Negative for arthralgias, back pain, gait problem, joint swelling, neck pain and neck stiffness.   Skin: Positive for rash. Negative for color change and pallor.   Allergic/Immunologic: Negative for immunocompromised state.   Neurological: Positive for  weakness. Negative for dizziness, seizures and numbness.   Hematological: Positive for adenopathy. Does not bruise/bleed easily.   Psychiatric/Behavioral: Negative for agitation and behavioral problems.     Objective:     Vital Signs (Most Recent):  Temp: 98.7 °F (37.1 °C) (01/30/19 0820)  Pulse: 95 (01/30/19 0820)  Resp: 16 (01/30/19 0820)  BP: (!) 140/73 (01/30/19 0820)  SpO2: 98 % (01/30/19 0820) Vital Signs (24h Range):  Temp:  [97.9 °F (36.6 °C)-99 °F (37.2 °C)] 98.7 °F (37.1 °C)  Pulse:  [78-97] 95  Resp:  [14-20] 16  SpO2:  [95 %-100 %] 98 %  BP: (132-161)/(73-96) 140/73     Weight: 78.8 kg (173 lb 12.8 oz)  Body mass index is 28.92 kg/m².  Body surface area is 1.9 meters squared.      Intake/Output Summary (Last 24 hours) at 1/30/2019 0834  Last data filed at 1/30/2019 0600  Gross per 24 hour   Intake 560 ml   Output 1400 ml   Net -840 ml       Physical Exam   Constitutional: She is oriented to person, place, and time. She appears well-developed and well-nourished. No distress.   HENT:   Head: Atraumatic.   Nose: Nose normal.   Mouth/Throat: No oropharyngeal exudate (white tongue / exudate mostly resolved).   Face appears swollen   Eyes: Conjunctivae and EOM are normal. Pupils are equal, round, and reactive to light. Right eye exhibits no discharge. Left eye exhibits no discharge. No scleral icterus.   Neck: Normal range of motion. Neck supple. No tracheal deviation present.   Cardiovascular: Normal rate, regular rhythm and intact distal pulses. Exam reveals no gallop and no friction rub.   Pulmonary/Chest: Effort normal and breath sounds normal. No respiratory distress. She has no wheezes. She has no rales. She exhibits no tenderness.   Abdominal: Soft. Bowel sounds are normal. She exhibits no distension and no mass. There is no tenderness. There is no rebound and no guarding.   Musculoskeletal: Normal range of motion. She exhibits edema (facial edema, upper extremity edema b/l). She exhibits no tenderness  or deformity.   Neurological: She is alert and oriented to person, place, and time. No cranial nerve deficit or sensory deficit.     4/5 shoulder strength on shoulder abduction; improving  5/5 flexion and extension preserved at elbow   Skin: Skin is warm and dry. Capillary refill takes less than 2 seconds. No rash noted. She is not diaphoretic. No erythema. No pallor.   Psychiatric: She has a normal mood and affect.       Significant Labs:   CBC:   Recent Labs   Lab 01/29/19  0500 01/30/19  0545   WBC 11.00 9.70   HGB 10.3* 9.7*   HCT 31.6* 29.6*    146*    and CMP:   Recent Labs   Lab 01/29/19  0500 01/30/19  0545    134*   K 3.6 3.6   CL 99 100   CO2 26 28   * 144*   BUN 19 24*   CREATININE 0.6 0.7   CALCIUM 9.1 8.8   PROT 6.3 6.7   ALBUMIN 3.0* 2.8*   BILITOT 0.5 0.6   ALKPHOS 76 68   * 135*   ALT 64* 57*   ANIONGAP 11 6*   EGFRNONAA >60.0 >60.0       Diagnostic Results:  I have reviewed and interpreted all pertinent imaging results/findings within the past 24 hours.

## 2019-01-30 NOTE — PROGRESS NOTES
[facility]  Rheumatology  Progress Note    Patient Name: Ermelinda Verde  MRN: [id number]  Admission Date: 1/22/2019  Hospital Length of Stay: 8 days  Code Status: Full Code   Attending Provider: Dale Dubon MD  Primary Care Physician: Reta Weeks MD  Principal Problem: Myositis    Subjective:     HPI: 59yo F with history of L sided infiltrating ductal carcinoma of the L breast (dx on Jan 2017), HTN, depression, and gastritis was send from hem/onc clinic for evaluation of possible inflammatory myositis.     L breast cancer s/p completion of 4 cycles of demi-adjuvant taxotere and cytoxan (completed on 5/9/17) and mastectomy (6/26/17) and then 4 cycles of adjuvant Adriamycin (completed 9/12/17). In 10/2017 - patient developed L supraclavicular lymphadenopathy which FNA confirmed as reoccurrence of previously treated breast cancer.     Patient started on Atelizumab/Abraxane on 11/7/18. Per chart review, patient noticed rashes on the dorsum of her hands on 11/11/18. Seen in urgent care and given topical steroid cream. Then received two more infusions on Atelizumab/Abraxane on 11/21/18 and 12/5/18. Started to notice puffiness around the eyes on 12/11/18. Received another Abraxane infusion on 12/12/18 but this time with hydrocortisone 50 mg which helped reduce the swelling around the eyes. Developed swelling of the face again on 12/15/18. Next infusion of Abraxane done on 12/19/18 with Solucortef and Atelizumab held. Abraxane given again on 1/3/19 with no IV steroid. Patient developed swelling of the face on 1/4/19 and went to urgent care and given short course of prednisone 20 mg BID. On 1/15/19, patient seen in hem/onc clinic with c/o of pressure and tightness around neck. CT scan of chest and neck did not reveal any vascular compression. Started on prednisone 60 mg with taper. On 1/22/18 patient with c/o proximal muscle weakness. CPK found to be elevated around 4k. Patient admitted and given solumedrol  80 mg IV on 1/22/18. Last infusion of Atelizumab on 12/19/18. Last infusion of Abraxane on 1/3/19. Given Solumedrol 1g x 1 on 1/23/18. Seen by Dermatology on 1/24/18 and biopsy done of skin rash. Given distribution of rashes, there was concern for dermatomyositis. Patient started on Solumedrol 125 mg IV BID at this time.     Denies any family history of autoimmune diseases.    No smoking, EtOH, recreational drug usage.    Denies any photosensitivity, joint swelling, unintentional weigh loss, abdominal pain, night sweats, CP, SOB.  +oral thrush.     Interval History: Patient seen at bedside. Strength improving as well as swallowing. Started on 5 day course of IVIG and MTX yesterday. Pending esophageal biopsy Remains NPO. No new rashes. No trouble breathing, f/c, n/v/d. Started on vaccinations.     Current Facility-Administered Medications   Medication Frequency    acetaminophen tablet 650 mg Q4H PRN    ALPRAZolam tablet 0.25 mg TID PRN    dextrose 50% injection 12.5 g PRN    dextrose 50% injection 25 g PRN    enoxaparin injection 40 mg Daily    famotidine (PF) injection 20 mg PRN    famotidine tablet 20 mg BID    fluconazole tablet 200 mg Q24H    folic acid tablet 1 mg Daily    glucagon (human recombinant) injection 1 mg PRN    glucose chewable tablet 16 g PRN    glucose chewable tablet 24 g PRN    hepatitis B (HEPLISAV-B) 20 mcg/0.5 mL vaccine 0.5 mL vaccine x 1 dose    hydrALAZINE injection 10 mg Q6H PRN    Immune Globulin G (IGG)-PRO-IGA 10 % injection (Privigen) 10 % injection 30 g Q24H    insulin aspart U-100 pen 0-5 Units QID (AC + HS) PRN    methylPREDNISolone sodium succinate injection 75 mg Q12H    ondansetron disintegrating tablet 8 mg Q8H PRN    oxyCODONE immediate release tablet 5 mg Q6H PRN    pneumoc 13-brandin conj-dip cr(PF) (PREVNAR 13 (PF)) 0.5 mL vaccine x 1 dose    polyethylene glycol packet 17 g BID PRN    promethazine tablet 12.5 mg Q6H PRN    sodium chloride 0.9% flush 10 mL  PRN    sodium chloride 0.9% flush 5 mL PRN     Objective:     Vital Signs (Most Recent):  Temp: 98.7 °F (37.1 °C) (01/30/19 0820)  Pulse: 95 (01/30/19 0820)  Resp: 16 (01/30/19 0820)  BP: (!) 140/73 (01/30/19 0820)  SpO2: 98 % (01/30/19 0820)  O2 Device (Oxygen Therapy): room air (01/30/19 0820) Vital Signs (24h Range):  Temp:  [97.9 °F (36.6 °C)-99 °F (37.2 °C)] 98.7 °F (37.1 °C)  Pulse:  [78-97] 95  Resp:  [14-20] 16  SpO2:  [95 %-100 %] 98 %  BP: (132-161)/(73-96) 140/73     Weight: 78.8 kg (173 lb 12.8 oz) (01/30/19 0529)  Body mass index is 28.92 kg/m².  Body surface area is 1.9 meters squared.      Intake/Output Summary (Last 24 hours) at 1/30/2019 0840  Last data filed at 1/30/2019 0600  Gross per 24 hour   Intake 560 ml   Output 1400 ml   Net -840 ml       Physical Exam   Constitutional: She is oriented to person, place, and time and well-developed, well-nourished, and in no distress. No distress.   HENT:   Head: Normocephalic and atraumatic.   Right Ear: External ear normal.   Left Ear: External ear normal.   Bilateral orbital edema with heliotropic rash - resolved   Eyes: Conjunctivae and EOM are normal. Pupils are equal, round, and reactive to light.   Neck: Normal range of motion. Neck supple.   Cardiovascular: Normal rate, regular rhythm, normal heart sounds and intact distal pulses.    Pulmonary/Chest: Effort normal and breath sounds normal. No respiratory distress.   Abdominal: Soft. Bowel sounds are normal.       Right Side Rheumatological Exam     Muscle Strength (0-5 scale):  Neck Flexion:  4.8  Neck Extension: 5  Deltoid:  4.6  Biceps: 5/5   Triceps:  5  : 5/5   Iliopsoas: 4.6  Quadriceps:  5   Distal Lower Extremity: 5    Left Side Rheumatological Exam     Muscle Strength (0-5 scale):  Neck Flexion:  4.8  Neck Extension: 5  Deltoid:  4.6  Biceps: 5/5   Triceps:  5  :  5/5   Iliopsoas: 4.6  Quadriceps:  5   Distal Lower Extremity: 5      Neurological: She is alert and oriented to person,  place, and time. GCS score is 15.   Skin: Skin is warm and dry. No rash noted.     Hyperpigmented non raised rash over nape of neck, elbows, knuckles (resemble Gottron's papules), thighs, and ankles.        Psychiatric: Mood, memory, affect and judgment normal.   Musculoskeletal: Normal range of motion. She exhibits edema. She exhibits no tenderness or deformity.   No signs of synovitis. ROM intact   Inability to move bilateral UE above 45 degrees simultaneously.  Able to move a little past 120 on each one alone           Significant Labs:  Recent Results (from the past 48 hour(s))   HIV 1/2 Ag/Ab (4th Gen)    Collection Time: 01/28/19  7:31 PM   Result Value Ref Range    HIV 1/2 Ag/Ab Negative Negative   Hepatitis B surface antigen    Collection Time: 01/28/19  7:31 PM   Result Value Ref Range    Hepatitis B Surface Ag Negative    Hepatitis B core antibody, total    Collection Time: 01/28/19  7:31 PM   Result Value Ref Range    Hep B Core Total Ab Negative    Hepatitis B surface antibody    Collection Time: 01/28/19  7:31 PM   Result Value Ref Range    Hep B S Ab Negative    Hepatitis C antibody    Collection Time: 01/28/19  7:31 PM   Result Value Ref Range    Hepatitis C Ab Negative    Hepatitis A antibody, IgG    Collection Time: 01/28/19  7:31 PM   Result Value Ref Range    Hepatitis A Antibody IgG Positive (A)    RPR    Collection Time: 01/28/19  7:31 PM   Result Value Ref Range    RPR Non-reactive Non-reactive   Immunoglobulins (IgG, IgA, IgM) Quantitative    Collection Time: 01/28/19  7:31 PM   Result Value Ref Range    IgG - Serum 932 650 - 1600 mg/dL    IgA 533 (H) 40 - 350 mg/dL    IgM 66 50 - 300 mg/dL   Gamma GT    Collection Time: 01/28/19  7:31 PM   Result Value Ref Range    GGT 29 8 - 55 U/L   Comprehensive Metabolic Panel (CMP)    Collection Time: 01/29/19  5:00 AM   Result Value Ref Range    Sodium 136 136 - 145 mmol/L    Potassium 3.6 3.5 - 5.1 mmol/L    Chloride 99 95 - 110 mmol/L    CO2 26 23 -  29 mmol/L    Glucose 115 (H) 70 - 110 mg/dL    BUN, Bld 19 6 - 20 mg/dL    Creatinine 0.6 0.5 - 1.4 mg/dL    Calcium 9.1 8.7 - 10.5 mg/dL    Total Protein 6.3 6.0 - 8.4 g/dL    Albumin 3.0 (L) 3.5 - 5.2 g/dL    Total Bilirubin 0.5 0.1 - 1.0 mg/dL    Alkaline Phosphatase 76 55 - 135 U/L     (H) 10 - 40 U/L    ALT 64 (H) 10 - 44 U/L    Anion Gap 11 8 - 16 mmol/L    eGFR if African American >60.0 >60 mL/min/1.73 m^2    eGFR if non African American >60.0 >60 mL/min/1.73 m^2   Magnesium    Collection Time: 01/29/19  5:00 AM   Result Value Ref Range    Magnesium 2.5 1.6 - 2.6 mg/dL   Phosphorus    Collection Time: 01/29/19  5:00 AM   Result Value Ref Range    Phosphorus 3.3 2.7 - 4.5 mg/dL   CK    Collection Time: 01/29/19  5:00 AM   Result Value Ref Range    CPK 1789 (H) 20 - 180 U/L   CBC auto differential    Collection Time: 01/29/19  5:00 AM   Result Value Ref Range    WBC 11.00 3.90 - 12.70 K/uL    RBC 3.81 (L) 4.00 - 5.40 M/uL    Hemoglobin 10.3 (L) 12.0 - 16.0 g/dL    Hematocrit 31.6 (L) 37.0 - 48.5 %    MCV 83 82 - 98 fL    MCH 27.0 27.0 - 31.0 pg    MCHC 32.6 32.0 - 36.0 g/dL    RDW 17.7 (H) 11.5 - 14.5 %    Platelets 192 150 - 350 K/uL    MPV 9.8 9.2 - 12.9 fL    Immature Granulocytes 1.0 (H) 0.0 - 0.5 %    Gran # (ANC) 9.4 (H) 1.8 - 7.7 K/uL    Immature Grans (Abs) 0.11 (H) 0.00 - 0.04 K/uL    Lymph # 0.8 (L) 1.0 - 4.8 K/uL    Mono # 0.7 0.3 - 1.0 K/uL    Eos # 0.0 0.0 - 0.5 K/uL    Baso # 0.01 0.00 - 0.20 K/uL    nRBC 0 0 /100 WBC    Gran% 85.1 (H) 38.0 - 73.0 %    Lymph% 7.2 (L) 18.0 - 48.0 %    Mono% 6.6 4.0 - 15.0 %    Eosinophil% 0.0 0.0 - 8.0 %    Basophil% 0.1 0.0 - 1.9 %    Differential Method Automated    Aldolase    Collection Time: 01/29/19  5:00 AM   Result Value Ref Range    Aldolase 10.8 (H) 1.2 - 7.6 U/L   Comprehensive Metabolic Panel (CMP)    Collection Time: 01/30/19  5:45 AM   Result Value Ref Range    Sodium 134 (L) 136 - 145 mmol/L    Potassium 3.6 3.5 - 5.1 mmol/L    Chloride 100 95  - 110 mmol/L    CO2 28 23 - 29 mmol/L    Glucose 144 (H) 70 - 110 mg/dL    BUN, Bld 24 (H) 6 - 20 mg/dL    Creatinine 0.7 0.5 - 1.4 mg/dL    Calcium 8.8 8.7 - 10.5 mg/dL    Total Protein 6.7 6.0 - 8.4 g/dL    Albumin 2.8 (L) 3.5 - 5.2 g/dL    Total Bilirubin 0.6 0.1 - 1.0 mg/dL    Alkaline Phosphatase 68 55 - 135 U/L     (H) 10 - 40 U/L    ALT 57 (H) 10 - 44 U/L    Anion Gap 6 (L) 8 - 16 mmol/L    eGFR if African American >60.0 >60 mL/min/1.73 m^2    eGFR if non African American >60.0 >60 mL/min/1.73 m^2   Magnesium    Collection Time: 01/30/19  5:45 AM   Result Value Ref Range    Magnesium 2.4 1.6 - 2.6 mg/dL   Phosphorus    Collection Time: 01/30/19  5:45 AM   Result Value Ref Range    Phosphorus 3.2 2.7 - 4.5 mg/dL   CK    Collection Time: 01/30/19  5:45 AM   Result Value Ref Range    CPK 1424 (H) 20 - 180 U/L   CBC auto differential    Collection Time: 01/30/19  5:45 AM   Result Value Ref Range    WBC 9.70 3.90 - 12.70 K/uL    RBC 3.56 (L) 4.00 - 5.40 M/uL    Hemoglobin 9.7 (L) 12.0 - 16.0 g/dL    Hematocrit 29.6 (L) 37.0 - 48.5 %    MCV 83 82 - 98 fL    MCH 27.2 27.0 - 31.0 pg    MCHC 32.8 32.0 - 36.0 g/dL    RDW 17.8 (H) 11.5 - 14.5 %    Platelets 146 (L) 150 - 350 K/uL    MPV 9.4 9.2 - 12.9 fL    Immature Granulocytes 0.9 (H) 0.0 - 0.5 %    Gran # (ANC) 8.6 (H) 1.8 - 7.7 K/uL    Immature Grans (Abs) 0.09 (H) 0.00 - 0.04 K/uL    Lymph # 0.5 (L) 1.0 - 4.8 K/uL    Mono # 0.5 0.3 - 1.0 K/uL    Eos # 0.0 0.0 - 0.5 K/uL    Baso # 0.01 0.00 - 0.20 K/uL    nRBC 0 0 /100 WBC    Gran% 89.0 (H) 38.0 - 73.0 %    Lymph% 4.7 (L) 18.0 - 48.0 %    Mono% 5.3 4.0 - 15.0 %    Eosinophil% 0.0 0.0 - 8.0 %    Basophil% 0.1 0.0 - 1.9 %    Differential Method Automated    Results for ROMEO PEREZ (MRN [id number]) as of 1/30/2019 08:39   Ref. Range 1/26/2019 04:00 1/27/2019 04:21 1/28/2019 04:00 1/29/2019 05:00 1/30/2019 05:45   CPK Latest Ref Range: 20 - 180 U/L 1947 (H) 2076 (H) 2249 (H) 1789 (H) 1424 (H)       Results  for ROMEO PEREZ (MRN [id number]) as of 1/28/2019 10:23   Ref. Range 1/24/2019 03:40 1/25/2019 04:20 1/26/2019 04:00 1/27/2019 04:21 1/28/2019 04:00   Aldolase Latest Ref Range: 1.2 - 7.6 U/L 12.7 (H) 14.2 (H) 13.7 (H) 13.1 (H) 15.4 (H)   Results for ROMEO PEREZ (MRN [id number]) as of 1/28/2019 10:23   Ref. Range 1/22/2019 22:24   Sed Rate Latest Ref Range: 0 - 36 mm/Hr 50 (H)     Results for ROMEO PEREZ (MRN [id number]) as of 1/28/2019 10:23   Ref. Range 1/22/2019 22:24   CRP Latest Ref Range: 0.0 - 8.2 mg/L 31.1 (H)      Results for ROMEO PEREZ (MRN [id number]) as of 1/28/2019 10:23   Ref. Range 1/22/2019 22:24 1/25/2019 17:33   DARCY HEP-2 Titer Unknown Positive 1:640 Sp...    Anti-SSA Antibody Latest Ref Range: 0.00 - 19.99 EU 0.49    Anti-SSA Interpretation Latest Ref Range: Negative  Negative    Anti-SSB Antibody Latest Ref Range: 0.00 - 19.99 EU 0.11    Anti-SSB Interpretation Latest Ref Range: Negative  Negative    ds DNA Ab Latest Ref Range: Negative 1:10  Negative 1:10    Anti Sm Antibody Latest Ref Range: 0.00 - 19.99 EU 0.58    Anti-Sm Interpretation Latest Ref Range: Negative  Negative    Anti Sm/RNP Antibody Latest Ref Range: 0.00 - 19.99 EU 0.62    Anti-Sm/RNP Interpretation Latest Ref Range: Negative  Negative    DARCY Screen Latest Ref Range: Negative <1:160  Positive (A)    Complement (C-3) Latest Ref Range: 50 - 180 mg/dL  106   Complement (C-4) Latest Ref Range: 11 - 44 mg/dL  29   Meg-1 Autoantibodies Latest Ref Range: <1.0 Index <1.00    Results for ROMEO PEREZ (MRN [id number]) as of 1/28/2019 10:23   Ref. Range 1/23/2019 02:55 1/23/2019 02:56   Specimen UA Unknown Urine, Clean Catch    Color, UA Latest Ref Range: Yellow, Straw, [place]  Colorless (A)    pH, UA Latest Ref Range: 5.0 - 8.0  6.0    Specific Gravity, UA Latest Ref Range: 1.005 - 1.030  1.005    Appearance, UA Latest Ref Range: Clear  Clear    Protein, UA Latest Ref Range: Negative  Negative    Glucose, UA Latest Ref  Range: Negative  Negative    Ketones, UA Latest Ref Range: Negative  Negative    Occult Blood UA Latest Ref Range: Negative  Negative    Nitrite, UA Latest Ref Range: Negative  Negative    Bilirubin (UA) Latest Ref Range: Negative  Negative    Leukocytes, UA Latest Ref Range: Negative  Trace (A)    RBC, UA Latest Ref Range: 0 - 4 /hpf  2   WBC, UA Latest Ref Range: 0 - 5 /hpf  3   Squam Epithel, UA Latest Units: /hpf  0   Microscopic Comment Unknown  SEE COMMENT     Significant Imaging:  MRI Humerus w/ and w/o contrast:  Impression       1. Diffuse edema and enhancement of the visualized left upper extremity musculature, most pronounced proximally, most notably of the deltoid and biceps musculature as detailed above.  Findings are nonspecific although could relate to a nonspecific myelitis particularly in light of patient's reported history.  Clinical correlation with appropriate history and lab markers advised.  2. No evidence of abscess or osteomyelitis.  This report was flagged in Epic as abnormal.     NM PET CT 12/27/18:  Impression       See above    Significant improvement in all the previously seen lymph nodes involving the left lower neck, supraclavicular region, axillary and retropectoral region.    Index left retropectoral SUV max 3.57, previously 27.2.     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 Biopsied.                        - Small hiatal hernia.                        - Normal stomach.                        - Normal examined duodenum.  Recommendation:       - Return patient to hospital to for ongoing care.                        - Await pathology results.                        - Use a proton pump inhibitor PO BID.                        - The findings and recommendations were discussed                         with the designated responsible adult.                        - Repeat  upper endoscopy in 8 weeks to check                         healing.           Assessment/Plan:     * Myositis    59yo F with history of L sided infiltrating ductal carcinoma of the L breast (dx on Jan 2017), HTN, depression, and gastritis was send from hem/onc clinic for evaluation of possible inflammatory myositis.     Patient started on Atelizumab/Abraxane on 11/7/18. Per chart review, patient noticed rashes on the dorsum of her hands on 11/11/18. Seen in urgent care and given topical steroid cream. Then received two more infusions on Atelizumab/Abraxane on 11/21/18 and 12/5/18. Started to notice puffiness around the eyes on 12/11/18. Received another Abraxane infusion on 12/12/18 but this time with hydrocortisone 50 mg which helped reduce the swelling around the eyes. Developed swelling of the face again on 12/15/18. Next infusion of Abraxane done on 12/19/18 with Solucortef and Atelizumab held. Abraxane given again on 1/3/19 with no IV steroid. Patient developed swelling of the face on 1/4/19 and went to urgent care and given short course of prednisone 20 mg BID. On 1/15/19, patient seen in hem/onc clinic with c/o of pressure and tightness around neck. CT scan of chest and neck did not reveal any vascular compression. Started on prednisone 60 mg with taper. On 1/22/18 patient with c/o proximal muscle weakness. CPK found to be elevated around 4k. Patient admitted and given solumedrol 80 mg IV on 1/22/18. Last infusion of Atelizumab on 12/19/18. Last infusion of Abraxane on 1/3/19. Given Solumedrol 1g x 1 on 1/23/18. Seen by Dermatology on 1/24/18 and biopsy done of skin rash. Given distribution of rashes, there was concern for dermatomyositis. Patient started on Solumedrol 125 mg IV BID at this time.      Labs: ESR 50, CRP 31.1, CPK 4562 (trending down), Aldolase 13.6.  , ALT 64.  UA normal.  CBC unremarkable.  +DARCY 1:640 speckled with negative profile.  Complements normal. Normal GGT. RPR, HIV negative.  "Hep B/C negative.    Case reports of docetaxel related inflammatory myositis had been documented. "...taxanes have been noted to cause disabling but transient arthralgia and myalgias; it is important to consider the possibility of inflammatory myopathy as a possible complication in patients undergoing treatment with these agents." - A case of docetaxel induced myositis and review of literature. Austin et al 2015.    Case reports in Rheumatology   Case reports of atezolizumb (immunomodulator) cause of myositis  - Lucy et al 2017 "Myositis as an adverse even of immune checkpoint blockade for cancer therapy.  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   Dermatomyositis can be associated with malignancy.  MRI of LUE showed edema of the deltoid and biceps.  Exam with proximal muscle weakness: b/l deltoids 4/5, b/l iliopsoas 4.4/5. 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      Inpatient treatments so far:  - Solumedrol 80 mg IV x 1 on 1/22/19  - Solumedrol 1 g IV x 1 on 1/23/19.  - Solumedrol 125 mg IV BID since 1/24/19.     Plan:  - chemotherapy can be re-started as it may help treat Dermatomyositis. Would recommend against checkpoint inhibitor, however, as it may have been a trigger for the dermatomyositis  - continue to monitor CPK, aldolase, AST/ALT  - Decrease Solumderol to 50mg IV q12h.  - quantitative immunoglobulins wnl. Can start on IVIg 400 mg/kg daily x 5 days.  Therapy plan in place. Can be released by primary.   - MTX 15 mg sc weekly with daily folic acid.   - recommend ID fast track consult for vaccinations.  - f/u Quant TB, Strongyloides   - f/u esophageal biopsy results  - check PFTs " (spirometry and lung volumes)   - start physical therapy.   - f/u myomarker panel and HMGCR   - f/u with Dr. Swift and Dr. Campos in Rheumatology at discharge.           Wilner Reynolds MD  Rheumatology  [facility] have personally reviewed the history, confirmed exam findings, and discussed assessment and plan with fellow.    Day #2 IVIg 400mg/kg to complete 5 day course  Cont methotrexate 15mg sc once weekly(first dose 1/29/19) with folic acid 1mg daily  Decrease Solu-Medrol 50 mg IV  q 12h  Continue famotidine  1200mg dietary  calcium  And vitamin D3 800 units daily  Await esophageal biopsies  Cont PT, then Rehab

## 2019-01-30 NOTE — PLAN OF CARE
Problem: Adult Inpatient Plan of Care  Goal: Plan of Care Review  Outcome: Ongoing (interventions implemented as appropriate)  Patient remains free from falls and injury this shift. Bed in low, locked position with call light in reach.  at bedside. VS stable, no complaints of nausea or pain. EGD and PFT completed today.  SLP re-evaluated pt and instructed to keep NPO and ok for ice chips. Tube feedings initiated at 10ml/h with goal of 65ml/h- pt tolerating well. MTX and IVIG ordered to initate tonight. Patient encouraged to call for assistance when getting out of bed.  Patient verbalized understanding. All belongings within reach.  Will continue to monitor.

## 2019-01-30 NOTE — PLAN OF CARE
Problem: SLP Goal  Goal: SLP Goal  Speech-Language Pathology Goals  Goals expected to be met by 2/2:   1) Pt will participate in ongoing swallow assessment to determine appropriateness of po diet.   2) Pt will participate in MBSS to further assess swallow function.  - met  3) Pt will participate in trial dysphagia tx in attempt to improve strength/safety  of swallow .  4) Pt / family education regarding dysphagia and aspiration risk.    Goals remain appropriate.   Emily P. Abadie M.S., CCC-SLP  Speech Language Pathologist  [phone]  01/30/2019

## 2019-01-30 NOTE — PT/OT/SLP PROGRESS
"Speech Language Pathology Treatment    Patient Name:  Ermelinda Verde   MRN:  [id number]  Admitting Diagnosis: Myositis    Recommendations:                 General Recommendations:  Dysphagia therapy  Diet recommendations:  NPO, Liquid Diet Level: NPO   Aspiration Precautions: Continue alternate means of nutrition and Strict aspiration precautions   General Precautions: Standard, aspiration, NPO, fall  Communication strategies:  none    Subjective     Pt awake and alert;no family members present.     Pain/Comfort:  · Pain Rating 1: 0/10    Objective:     Has the patient been evaluated by SLP for swallowing?   Yes  Keep patient NPO? Yes   Current Respiratory Status: room air      Patient found seated upright in bedside chair. Patient tolerated ice chips x5  with coughing and oral expectoration of secretions post ice chip trial. Per patient, "My mouth and throat are foaming after I swallow this". No further trials administered. Patient appearing to demonstrate increased performance with double swallows and volitional cough between trials. Patient to remain NPO. Skilled education was provided to patient and family members re: diet recs, standard aspiration precautions of which to follow, and ongoing ST plan of care.    Assessment:     Ermelinda Verde is a 58 y.o. female with an SLP diagnosis of Dysphagia.      Goals:   Multidisciplinary Problems     SLP Goals        Problem: SLP Goal    Goal Priority Disciplines Outcome   SLP Goal     SLP Ongoing (interventions implemented as appropriate)   Description:  Speech-Language Pathology Goals  Goals expected to be met by 2/2:   1) Pt will participate in ongoing swallow assessment to determine appropriateness of po diet.   2) Pt will participate in MBSS to further assess swallow function.  - met  3) Pt will participate in trial dysphagia tx in attempt to improve strength/safety  of swallow .  4) Pt / family education regarding dysphagia and aspiration risk.               "       Plan:     · Patient to be seen:  4 x/week   · Plan of Care expires:  02/24/19  · Plan of Care reviewed with:  patient   · SLP Follow-Up:  Yes       Discharge recommendations:  other (see comments)   Barriers to Discharge:  None    Time Tracking:     SLP Treatment Date:   01/30/19  Speech Start Time:  1139  Speech Stop Time:  1155     Speech Total Time (min):  16 min    Billable Minutes: Treatment Swallowing Dysfunction 8 and Seld Care/Home Management Training 8    Emily Abadie, CCC-SLP  01/30/2019

## 2019-01-30 NOTE — PLAN OF CARE
Problem: Adult Inpatient Plan of Care  Goal: Plan of Care Review  Outcome: Ongoing (interventions implemented as appropriate)  Uneventful shift. Pt has had no c/o pain or nausea this shift. IVIG completed. VSS. SQ MTX administered per chemo certified nurse. Pt NG tube in place to R nare. TF infusing at 30 cc/hr. Next increase to be done at 10am. R CW PAC needle changed 1/29/19. Pt remains NPO x ice chips. Pt has remained free from injury this shift. Bed in low locked position. Call light and personal belongings within reach. Side rails up x2. Nonskid socks in place. Pt instructed to call with any needs. Will continue to monitor.

## 2019-01-30 NOTE — ASSESSMENT & PLAN NOTE
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   - F/u 1/24 skin biopsy. Pending results may need muscle biopsy by general surgery  -MRI left humerus suspicious for myositis    Per rheumatology  - Methylpred 125mg IV bid started, now decreased to 75mg IV bid  - Starting MTX 15mg subQ qWeekly  - IVIG 400mg/kg x5 days  - PFTs, myomarker panel, HMGCR, QG-TB pending  - Quantitative IgA 533, IgG 932, IgM 66  - ID consult for fast track vacc

## 2019-01-30 NOTE — PROGRESS NOTES
IVIG infusing as ordered at this time. Pt premedicated with tylenol, pepcid and benadryl prior to start of infusion.  R CW portacath flushed with normal saline prior to start of infusion with good blood return noted.  IVIG infusion started at 0.5mg/kg/hr as ordered; will increase rate as ordered per pt tolerance.  Frequent VS initiated with infusion.  Pt tolerating well so far; no distress noted.  Will continue to monitor.

## 2019-01-31 LAB
ALBUMIN SERPL BCP-MCNC: 2.7 G/DL
ALP SERPL-CCNC: 66 U/L
ALT SERPL W/O P-5'-P-CCNC: 52 U/L
ANION GAP SERPL CALC-SCNC: 6 MMOL/L
AST SERPL-CCNC: 118 U/L
BASOPHILS # BLD AUTO: 0.01 K/UL
BASOPHILS NFR BLD: 0.1 %
BILIRUB SERPL-MCNC: 0.5 MG/DL
BUN SERPL-MCNC: 24 MG/DL
CALCIUM SERPL-MCNC: 8.5 MG/DL
CHLORIDE SERPL-SCNC: 101 MMOL/L
CK SERPL-CCNC: 1147 U/L
CO2 SERPL-SCNC: 28 MMOL/L
CREAT SERPL-MCNC: 0.6 MG/DL
DIFFERENTIAL METHOD: ABNORMAL
EOSINOPHIL # BLD AUTO: 0 K/UL
EOSINOPHIL NFR BLD: 0 %
ERYTHROCYTE [DISTWIDTH] IN BLOOD BY AUTOMATED COUNT: 18 %
EST. GFR  (AFRICAN AMERICAN): >60 ML/MIN/1.73 M^2
EST. GFR  (NON AFRICAN AMERICAN): >60 ML/MIN/1.73 M^2
GLUCOSE SERPL-MCNC: 157 MG/DL
HCT VFR BLD AUTO: 30.6 %
HGB BLD-MCNC: 9.6 G/DL
IMM GRANULOCYTES # BLD AUTO: 0.08 K/UL
IMM GRANULOCYTES NFR BLD AUTO: 0.8 %
LYMPHOCYTES # BLD AUTO: 0.4 K/UL
LYMPHOCYTES NFR BLD: 4.2 %
MAGNESIUM SERPL-MCNC: 2.3 MG/DL
MCH RBC QN AUTO: 27.3 PG
MCHC RBC AUTO-ENTMCNC: 31.4 G/DL
MCV RBC AUTO: 87 FL
MONOCYTES # BLD AUTO: 0.6 K/UL
MONOCYTES NFR BLD: 5.6 %
NEUTROPHILS # BLD AUTO: 9 K/UL
NEUTROPHILS NFR BLD: 89.3 %
NRBC BLD-RTO: 0 /100 WBC
PHOSPHATE SERPL-MCNC: 2.1 MG/DL
PLATELET # BLD AUTO: 151 K/UL
PMV BLD AUTO: 9.7 FL
POTASSIUM SERPL-SCNC: 3.7 MMOL/L
PROT SERPL-MCNC: 6.9 G/DL
RBC # BLD AUTO: 3.52 M/UL
SODIUM SERPL-SCNC: 135 MMOL/L
WBC # BLD AUTO: 10.12 K/UL

## 2019-01-31 PROCEDURE — 99231 PR SUBSEQUENT HOSPITAL CARE,LEVL I: ICD-10-PCS | Mod: ,,, | Performed by: INTERNAL MEDICINE

## 2019-01-31 PROCEDURE — 63600175 PHARM REV CODE 636 W HCPCS: Performed by: STUDENT IN AN ORGANIZED HEALTH CARE EDUCATION/TRAINING PROGRAM

## 2019-01-31 PROCEDURE — 20600001 HC STEP DOWN PRIVATE ROOM

## 2019-01-31 PROCEDURE — 97535 SELF CARE MNGMENT TRAINING: CPT

## 2019-01-31 PROCEDURE — 84100 ASSAY OF PHOSPHORUS: CPT

## 2019-01-31 PROCEDURE — 99231 SBSQ HOSP IP/OBS SF/LOW 25: CPT | Mod: ,,, | Performed by: INTERNAL MEDICINE

## 2019-01-31 PROCEDURE — 99232 PR SUBSEQUENT HOSPITAL CARE,LEVL II: ICD-10-PCS | Mod: ,,, | Performed by: INTERNAL MEDICINE

## 2019-01-31 PROCEDURE — 83735 ASSAY OF MAGNESIUM: CPT

## 2019-01-31 PROCEDURE — 85025 COMPLETE CBC W/AUTO DIFF WBC: CPT

## 2019-01-31 PROCEDURE — 99232 SBSQ HOSP IP/OBS MODERATE 35: CPT | Mod: ,,, | Performed by: INTERNAL MEDICINE

## 2019-01-31 PROCEDURE — 63600175 PHARM REV CODE 636 W HCPCS: Performed by: INTERNAL MEDICINE

## 2019-01-31 PROCEDURE — 82550 ASSAY OF CK (CPK): CPT

## 2019-01-31 PROCEDURE — 25000003 PHARM REV CODE 250: Performed by: STUDENT IN AN ORGANIZED HEALTH CARE EDUCATION/TRAINING PROGRAM

## 2019-01-31 PROCEDURE — 25000003 PHARM REV CODE 250: Performed by: INTERNAL MEDICINE

## 2019-01-31 PROCEDURE — S0028 INJECTION, FAMOTIDINE, 20 MG: HCPCS | Performed by: STUDENT IN AN ORGANIZED HEALTH CARE EDUCATION/TRAINING PROGRAM

## 2019-01-31 PROCEDURE — 82085 ASSAY OF ALDOLASE: CPT

## 2019-01-31 PROCEDURE — 80053 COMPREHEN METABOLIC PANEL: CPT

## 2019-01-31 PROCEDURE — 63600175 PHARM REV CODE 636 W HCPCS: Mod: JG | Performed by: INTERNAL MEDICINE

## 2019-01-31 RX ORDER — METHYLPREDNISOLONE SOD SUCC 125 MG
40 VIAL (EA) INJECTION EVERY 12 HOURS
Status: DISCONTINUED | OUTPATIENT
Start: 2019-01-31 | End: 2019-02-01

## 2019-01-31 RX ADMIN — ESCITALOPRAM OXALATE 10 MG: 5 TABLET, FILM COATED ORAL at 09:01

## 2019-01-31 RX ADMIN — HUMAN IMMUNOGLOBULIN G 30 G: 20 LIQUID INTRAVENOUS at 04:01

## 2019-01-31 RX ADMIN — ENOXAPARIN SODIUM 40 MG: 100 INJECTION SUBCUTANEOUS at 05:01

## 2019-01-31 RX ADMIN — FOLIC ACID 1 MG: 1 TABLET ORAL at 09:01

## 2019-01-31 RX ADMIN — FAMOTIDINE 20 MG: 10 INJECTION, SOLUTION INTRAVENOUS at 03:01

## 2019-01-31 RX ADMIN — METHYLPREDNISOLONE SODIUM SUCCINATE 40 MG: 125 INJECTION, POWDER, FOR SOLUTION INTRAMUSCULAR; INTRAVENOUS at 09:01

## 2019-01-31 RX ADMIN — FLUCONAZOLE 200 MG: 200 TABLET ORAL at 03:01

## 2019-01-31 RX ADMIN — METHYLPREDNISOLONE SODIUM SUCCINATE 50 MG: 125 INJECTION, POWDER, FOR SOLUTION INTRAMUSCULAR; INTRAVENOUS at 09:01

## 2019-01-31 RX ADMIN — ACETAMINOPHEN 650 MG: 325 TABLET, FILM COATED ORAL at 03:01

## 2019-01-31 RX ADMIN — AMLODIPINE BESYLATE 10 MG: 10 TABLET ORAL at 09:01

## 2019-01-31 RX ADMIN — DIPHENHYDRAMINE HYDROCHLORIDE 50 MG: 50 INJECTION, SOLUTION INTRAMUSCULAR; INTRAVENOUS at 03:01

## 2019-01-31 NOTE — PT/OT/SLP PROGRESS
Occupational Therapy   Treatment/ D/C    Name: Ermelinda Verde  MRN: [id number]  Admitting Diagnosis:  Myositis  2 Days Post-Op    Recommendations:     Discharge Recommendations: (HH)  Discharge Equipment Recommendations:  walker, rolling  Barriers to discharge:  None    Assessment:     Ermelinda Verde is a 58 y.o. female with a medical diagnosis of Myositis.  She presents with Impairments listed below. Pt is not currently displaying a need for acute OT services. D/C acute OT services and recommend pt D/C home w/ HH. Performance deficits affecting function are weakness, impaired endurance, decreased upper extremity function.     Rehab Prognosis:  Good; patient would benefit from acute skilled OT services to address these deficits and reach maximum level of function.       Plan:     Patient to be seen (D/C acute OT services ) to address the above listed problems via self-care/home management, therapeutic activities, therapeutic exercises  · Plan of Care Expires: 02/27/19  · Plan of Care Reviewed with: patient    Subjective     Pain/Comfort:  · Pain Rating 1: 0/10  · Pain Rating Post-Intervention 1: 0/10    Objective:     Communicated with: RN prior to session.  Patient found seated EOB with NG tube, telemetry upon OT entry to room.    General Precautions: Standard, aspiration, NPO, fall   Orthopedic Precautions:N/A   Braces: N/A     Occupational Performance:       Functional Mobility/Transfers:  · Patient completed Sit <> Stand Transfer with supervision  with  no assistive device   · Patient completed Toilet Transfer Step Transfer technique with supervision with  no AD  · Functional Mobility: Pt ambulated ~25 ft at spv w/o AD. 1 minor LOB, but able to self-correct.     Activities of Daily Living:  · Grooming: supervision oral, facial, and hand hygiene       [facility] 6 Click ADL: 24    Treatment & Education:  Pt educated on POC.     Patient left seated EOB at request with all lines intact, call button in reach and RN  notifiedEducation:      GOALS:   Multidisciplinary Problems     Occupational Therapy Goals     Not on file          Multidisciplinary Problems (Resolved)        Problem: Occupational Therapy Goal    Goal Priority Disciplines Outcome Interventions   Occupational Therapy Goal   (Resolved)     OT, PT/OT Outcome(s) achieved    Description:  Goals to be met by 2/5/2019    Pt will be indep w/ BUE HEP.  Pt will indep feed.                     Time Tracking:     OT Date of Treatment: 01/31/19  OT Start Time: 0918  OT Stop Time: 0927  OT Total Time (min): 9 min    Billable Minutes:Self Care/Home Management 9 minutes    Rudy Brady, OT  1/31/2019

## 2019-01-31 NOTE — PLAN OF CARE
Problem: Adult Inpatient Plan of Care  Goal: Plan of Care Review  Outcome: Ongoing (interventions implemented as appropriate)  Patient remains free from falls and injury this shift. Bed in low, locked position with call light in reach.  at bedside. VS stable, denies pain or nausea.  NGT to right nare, Tube feeding rate increased to 50ml/h-pt tolerating well.  IVIG infusion started at 24 ml/h, premeds given. Patient ambulated in [place] with standby asst today, complaining of feeling weak at times. Patient encouraged to call for assistance when getting out of bed.  Patient verbalized understanding. All belongings within reach.  Will continue to monitor.

## 2019-01-31 NOTE — ASSESSMENT & PLAN NOTE
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   - F/u 1/24 skin biopsy. Pending results may need muscle biopsy by general surgery  -MRI left humerus suspicious for myositis    Per rheumatology  - Methylpred 125mg IV bid started, now tapered to 50mg IV bid  - Starting MTX 15mg subQ qWeekly  - IVIG 400mg/kg x5 days (1/31 is day 3/5)  - PFTs, myomarker panel, HMGCR, QG-TB pending  - Quantitative IgA 533, IgG 932, IgM 66  - ID consulted for fast track vacc  - f/u with Dr. Swift and Dr. Campos in Rheumatology at discharge

## 2019-01-31 NOTE — PROGRESS NOTES
"Follow up:     SW intern visited pt at bedside. Pt stated is doing "much better" and "coping well". Pt stated she was anxious "at the beginning", However coped through anxiety through family support. SW continues to follow and provide support.   "

## 2019-01-31 NOTE — ASSESSMENT & PLAN NOTE
59yo F with history of L sided infiltrating ductal carcinoma of the L breast (dx on Jan 2017), HTN, depression, and gastritis was send from hem/onc clinic for evaluation of possible inflammatory myositis.     Patient started on Atelizumab/Abraxane on 11/7/18. Per chart review, patient noticed rashes on the dorsum of her hands on 11/11/18. Seen in urgent care and given topical steroid cream. Then received two more infusions on Atelizumab/Abraxane on 11/21/18 and 12/5/18. Started to notice puffiness around the eyes on 12/11/18. Received another Abraxane infusion on 12/12/18 but this time with hydrocortisone 50 mg which helped reduce the swelling around the eyes. Developed swelling of the face again on 12/15/18. Next infusion of Abraxane done on 12/19/18 with Solucortef and Atelizumab held. Abraxane given again on 1/3/19 with no IV steroid. Patient developed swelling of the face on 1/4/19 and went to urgent care and given short course of prednisone 20 mg BID. On 1/15/19, patient seen in hem/onc clinic with c/o of pressure and tightness around neck. CT scan of chest and neck did not reveal any vascular compression. Started on prednisone 60 mg with taper. On 1/22/18 patient with c/o proximal muscle weakness. CPK found to be elevated around 4k. Patient admitted and given solumedrol 80 mg IV on 1/22/18. Last infusion of Atelizumab on 12/19/18. Last infusion of Abraxane on 1/3/19. Given Solumedrol 1g x 1 on 1/23/18. Seen by Dermatology on 1/24/18 and biopsy done of skin rash. Given distribution of rashes, there was concern for dermatomyositis. Patient started on Solumedrol 125 mg IV BID at this time.      Labs: ESR 50, CRP 31.1, CPK 4562 (trending down), Aldolase 13.6.  , ALT 64.  UA normal.  CBC unremarkable.  +DARCY 1:640 speckled with negative profile.  Complements normal. Normal GGT. RPR, HIV negative. Hep B/C negative.    Spirometry: normal, normal diffusion capacity. FVC: 81%, DLCO: 114%    Case reports of  "docetaxel related inflammatory myositis had been documented. "...taxanes have been noted to cause disabling but transient arthralgia and myalgias; it is important to consider the possibility of inflammatory myopathy as a possible complication in patients undergoing treatment with these agents." - A case of docetaxel induced myositis and review of literature. Austin et al 2015.    Case reports in Rheumatology   Case reports of atezolizumb (immunomodulator) cause of myositis  - Lucy et al 2017 "Myositis as an adverse even of immune checkpoint blockade for cancer therapy.  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   Dermatomyositis can be associated with malignancy.  MRI of LUE showed edema of the deltoid and biceps.  Exam with proximal muscle weakness: b/l deltoids 4/5, b/l iliopsoas 4.4/5. 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      Inpatient treatments so far:  - Solumedrol 80 mg IV x 1 on 1/22/19  - Solumedrol 1 g IV x 1 on 1/23/19.  - Solumedrol 125 mg IV BID since 1/24/19 -->being tapered down.     Plan:  - chemotherapy can be re-started as it may help treat Dermatomyositis. Would recommend against checkpoint inhibitor, however, as it may have been a trigger for the dermatomyositis  - continue to monitor CPK, aldolase, AST/ALT  - Decrease Solumderol to 40mg IV q12h.  - continue IVIg 400 mg/kg daily x 5 days    - MTX 15 mg sc weekly with daily folic acid.   - recommend ID fast track consult for vaccinations.  - f/u Quant TB, Strongyloides   - f/u esophageal biopsy results  - start physical therapy.   - f/u myomarker panel and HMGCR   - f/u with Dr. Swift and Dr. Campos in Rheumatology at " discharge.

## 2019-01-31 NOTE — PLAN OF CARE
MDRs completed with Dr. Reyna and the team. Day 3 of 5 of IVIG treatment. Rheumatology recs: steroid (Methylpred) decreased to 50mg BID and MTX 15mg subQ weekly. Plans for swallow re-eval tomorrow. T. max 99.1; VSS. Labs stable. CPK 1147; down from 1424 on 1/30/19. CM to continue to follow with the team.    Nicole Carter, RN, BSN, CM  [facility]  Nurse - Med Onc/Gyn Onc  [phone]

## 2019-01-31 NOTE — PLAN OF CARE
Problem: Occupational Therapy Goal  Goal: Occupational Therapy Goal  Goals met    Pt will be indep w/ BUE HEP.  Pt will indep feed.    D/C acute OT services.    Comments: Rudy Brady OTR/L  1/31/2019

## 2019-01-31 NOTE — SUBJECTIVE & OBJECTIVE
Interval History: Endorses improving strength, and tenderness resolved. NAEO, throat irritation also improving. Plan for swallow trial tomorrow    Oncology Treatment Plan:   OP BREAST DOCETAXEL Q3W    Medications:  Continuous Infusions:    Scheduled Meds:   acetaminophen  650 mg Per NG tube Q24H    amLODIPine  10 mg Per NG tube Daily    diphenhydramine (BENADRYL) IVPB  50 mg Intravenous Q24H    enoxaparin  40 mg Subcutaneous Daily    escitalopram oxalate  10 mg Per NG tube Daily    famotidine (PF)  20 mg Intravenous Q24H    fluconazole  200 mg Per NG tube Q24H    folic acid  1 mg Oral Daily    Immune Globulin G (IGG)-PRO-IGA 10 % injection (Privigen)  400 mg/kg/day Intravenous Q24H    methylPREDNISolone sodium succinate  50 mg Intravenous Q12H     PRN Meds:acetaminophen, ALPRAZolam, dextrose 50%, dextrose 50%, famotidine (PF), glucagon (human recombinant), glucose, glucose, hepatitis B, hydrALAZINE, insulin aspart U-100, ondansetron, oxyCODONE, pneumoc 13-brandin conj-dip cr(PF), polyethylene glycol, promethazine, sodium chloride 0.9%, sodium chloride 0.9%     Review of Systems   Constitutional: Positive for fatigue. Negative for activity change, appetite change, chills, diaphoresis, fever and unexpected weight change.   HENT: Positive for facial swelling and trouble swallowing. Negative for congestion, ear discharge, hearing loss, postnasal drip, sinus pressure, sneezing, sore throat and tinnitus.    Eyes: Negative for photophobia, pain and redness.   Respiratory: Negative for apnea, cough, choking, chest tightness, shortness of breath and stridor.    Cardiovascular: Negative for chest pain, palpitations and leg swelling.   Gastrointestinal: Negative for abdominal pain, anal bleeding, constipation, diarrhea, nausea and rectal pain.   Endocrine: Negative for polyuria.   Genitourinary: Negative for dysuria and hematuria.   Musculoskeletal: Positive for myalgias. Negative for arthralgias, back pain, gait  problem, joint swelling, neck pain and neck stiffness.   Skin: Positive for rash. Negative for color change and pallor.   Allergic/Immunologic: Negative for immunocompromised state.   Neurological: Positive for weakness. Negative for dizziness, seizures and numbness.   Hematological: Positive for adenopathy. Does not bruise/bleed easily.   Psychiatric/Behavioral: Negative for agitation and behavioral problems.     Objective:     Vital Signs (Most Recent):  Temp: 98.7 °F (37.1 °C) (01/31/19 0900)  Pulse: 92 (01/31/19 0900)  Resp: 18 (01/31/19 0900)  BP: 123/64 (01/31/19 0900)  SpO2: 100 % (01/31/19 0900) Vital Signs (24h Range):  Temp:  [97.9 °F (36.6 °C)-99.1 °F (37.3 °C)] 98.7 °F (37.1 °C)  Pulse:  [81-94] 92  Resp:  [14-18] 18  SpO2:  [95 %-100 %] 100 %  BP: (123-155)/(64-82) 123/64     Weight: 78.8 kg (173 lb 11.2 oz)  Body mass index is 28.91 kg/m².  Body surface area is 1.9 meters squared.      Intake/Output Summary (Last 24 hours) at 1/31/2019 1110  Last data filed at 1/31/2019 0932  Gross per 24 hour   Intake 1059 ml   Output --   Net 1059 ml       Physical Exam   Constitutional: She is oriented to person, place, and time. She appears well-developed and well-nourished. No distress.   HENT:   Head: Atraumatic.   Nose: Nose normal.   Mouth/Throat: No oropharyngeal exudate (white tongue / exudate mostly resolved).   Face appears swollen   Eyes: Conjunctivae and EOM are normal. Pupils are equal, round, and reactive to light. Right eye exhibits no discharge. Left eye exhibits no discharge. No scleral icterus.   Neck: Normal range of motion. Neck supple. No tracheal deviation present.   Cardiovascular: Normal rate, regular rhythm and intact distal pulses. Exam reveals no gallop and no friction rub.   Pulmonary/Chest: Effort normal and breath sounds normal. No respiratory distress. She has no wheezes. She has no rales. She exhibits no tenderness.   Abdominal: Soft. Bowel sounds are normal. She exhibits no distension  and no mass. There is no tenderness. There is no rebound and no guarding.   Musculoskeletal: Normal range of motion. She exhibits edema (facial edema, upper extremity edema b/l). She exhibits no tenderness or deformity.   Neurological: She is alert and oriented to person, place, and time. No cranial nerve deficit or sensory deficit.     4/5 shoulder strength on shoulder abduction; improving  5/5 flexion and extension preserved at elbow   Skin: Skin is warm and dry. Capillary refill takes less than 2 seconds. No rash noted. She is not diaphoretic. No erythema. No pallor.   Psychiatric: She has a normal mood and affect.       Significant Labs:   CBC:   Recent Labs   Lab 01/30/19  0545 01/31/19  0400   WBC 9.70 10.12   HGB 9.7* 9.6*   HCT 29.6* 30.6*   * 151    and CMP:   Recent Labs   Lab 01/30/19  0545 01/31/19  0400   * 135*   K 3.6 3.7    101   CO2 28 28   * 157*   BUN 24* 24*   CREATININE 0.7 0.6   CALCIUM 8.8 8.5*   PROT 6.7 6.9   ALBUMIN 2.8* 2.7*   BILITOT 0.6 0.5   ALKPHOS 68 66   * 118*   ALT 57* 52*   ANIONGAP 6* 6*   EGFRNONAA >60.0 >60.0       Diagnostic Results:  I have reviewed and interpreted all pertinent imaging results/findings within the past 24 hours.

## 2019-01-31 NOTE — ASSESSMENT & PLAN NOTE
Patient is experiencing difficulty swallowing that has been increasing in severity over last couple of days to the point where patient did not feel as if she could swallow.  As of 01/26, SLP evaluated patient and determined that she should be NPO except for medications until a modified barium swallow could be performed. Possibly candidal esophagitis; patient has oral thrush.    Plan:  - High aspiration risk on modified barium study, strict NPO  - NGT in situ, tube feeds initiated per dietary consult  - Switched to IV meds where possible  - Fluconazole IVPB 200mg q24hr  - GI consulted, EGD pending today to r/o candidal esophagitis vs dermatomyositis motility dysfunction --> resulted w/ Grade C erosive esophagitis, no candidal infxn identified. Bx obtained, GI recs start PPI bid  - Plan swallow trial tomorrow (2/1/2019)  - Will initiate PPI following IVIG completion

## 2019-01-31 NOTE — PROGRESS NOTES
[facility]  Rheumatology  Progress Note    Patient Name: Ermelinda Verde  MRN: [id number]  Admission Date: 1/22/2019  Hospital Length of Stay: 9 days  Code Status: Full Code   Attending Provider: Alex Reyna MD  Primary Care Physician: Reta Weeks MD  Principal Problem: Myositis    Subjective:     HPI: 57yo F with history of L sided infiltrating ductal carcinoma of the L breast (dx on Jan 2017), HTN, depression, and gastritis was send from hem/onc clinic for evaluation of possible inflammatory myositis.     L breast cancer s/p completion of 4 cycles of demi-adjuvant taxotere and cytoxan (completed on 5/9/17) and mastectomy (6/26/17) and then 4 cycles of adjuvant Adriamycin (completed 9/12/17). In 10/2017 - patient developed L supraclavicular lymphadenopathy which FNA confirmed as reoccurrence of previously treated breast cancer.     Patient started on Atelizumab/Abraxane on 11/7/18. Per chart review, patient noticed rashes on the dorsum of her hands on 11/11/18. Seen in urgent care and given topical steroid cream. Then received two more infusions on Atelizumab/Abraxane on 11/21/18 and 12/5/18. Started to notice puffiness around the eyes on 12/11/18. Received another Abraxane infusion on 12/12/18 but this time with hydrocortisone 50 mg which helped reduce the swelling around the eyes. Developed swelling of the face again on 12/15/18. Next infusion of Abraxane done on 12/19/18 with Solucortef and Atelizumab held. Abraxane given again on 1/3/19 with no IV steroid. Patient developed swelling of the face on 1/4/19 and went to urgent care and given short course of prednisone 20 mg BID. On 1/15/19, patient seen in hem/onc clinic with c/o of pressure and tightness around neck. CT scan of chest and neck did not reveal any vascular compression. Started on prednisone 60 mg with taper. On 1/22/18 patient with c/o proximal muscle weakness. CPK found to be elevated around 4k. Patient admitted and given solumedrol  80 mg IV on 1/22/18. Last infusion of Atelizumab on 12/19/18. Last infusion of Abraxane on 1/3/19. Given Solumedrol 1g x 1 on 1/23/18. Seen by Dermatology on 1/24/18 and biopsy done of skin rash. Given distribution of rashes, there was concern for dermatomyositis. Patient started on Solumedrol 125 mg IV BID at this time.     Denies any family history of autoimmune diseases.    No smoking, EtOH, recreational drug usage.    Denies any photosensitivity, joint swelling, unintentional weigh loss, abdominal pain, night sweats, CP, SOB.  +oral thrush.     Interval History: Patient seen at bedside. Strength improving as well as swallowing. Tolerated MTX. On day 3 of IVIG. Pending esophageal biopsy. Remains NPO. No new rashes. No trouble breathing, f/c, n/v/d. Started on vaccinations.     Current Facility-Administered Medications   Medication Frequency    acetaminophen tablet 650 mg Q4H PRN    acetaminophen tablet 650 mg Q24H    ALPRAZolam tablet 0.25 mg TID PRN    amLODIPine tablet 10 mg Daily    dextrose 50% injection 12.5 g PRN    dextrose 50% injection 25 g PRN    diphenhydrAMINE (BENADRYL) 50 mg in sodium chloride 0.9% 50 mL IVPB Q24H    enoxaparin injection 40 mg Daily    escitalopram oxalate tablet 10 mg Daily    famotidine (PF) injection 20 mg PRN    famotidine (PF) injection 20 mg Q24H    fluconazole tablet 200 mg Q24H    folic acid tablet 1 mg Daily    glucagon (human recombinant) injection 1 mg PRN    glucose chewable tablet 16 g PRN    glucose chewable tablet 24 g PRN    hepatitis B (HEPLISAV-B) 20 mcg/0.5 mL vaccine 0.5 mL vaccine x 1 dose    hydrALAZINE injection 10 mg Q6H PRN    Immune Globulin G (IGG)-PRO-IGA 10 % injection (Privigen) 10 % injection 30 g Q24H    insulin aspart U-100 pen 0-5 Units QID (AC + HS) PRN    methylPREDNISolone sodium succinate injection 50 mg Q12H    ondansetron disintegrating tablet 8 mg Q8H PRN    oxyCODONE immediate release tablet 5 mg Q6H PRN    pneumoc  13-brandin conj-dip cr(PF) (PREVNAR 13 (PF)) 0.5 mL vaccine x 1 dose    polyethylene glycol packet 17 g BID PRN    promethazine tablet 12.5 mg Q6H PRN    sodium chloride 0.9% flush 10 mL PRN    sodium chloride 0.9% flush 5 mL PRN     Objective:     Vital Signs (Most Recent):  Temp: 98.3 °F (36.8 °C) (01/31/19 1158)  Pulse: 89 (01/31/19 1158)  Resp: 18 (01/31/19 1158)  BP: 108/62 (01/31/19 1158)  SpO2: 97 % (01/31/19 1158)  O2 Device (Oxygen Therapy): room air (01/31/19 0900) Vital Signs (24h Range):  Temp:  [98.2 °F (36.8 °C)-99.1 °F (37.3 °C)] 98.3 °F (36.8 °C)  Pulse:  [81-94] 89  Resp:  [14-18] 18  SpO2:  [95 %-100 %] 97 %  BP: (108-155)/(62-82) 108/62     Weight: 78.8 kg (173 lb 11.2 oz) (01/31/19 0730)  Body mass index is 28.91 kg/m².  Body surface area is 1.9 meters squared.      Intake/Output Summary (Last 24 hours) at 1/31/2019 1214  Last data filed at 1/31/2019 0932  Gross per 24 hour   Intake 1059 ml   Output --   Net 1059 ml       Physical Exam   Constitutional: She is oriented to person, place, and time and well-developed, well-nourished, and in no distress. No distress.   HENT:   Head: Normocephalic and atraumatic.   Right Ear: External ear normal.   Left Ear: External ear normal.   Bilateral orbital edema with heliotropic rash - resolved   Eyes: Conjunctivae and EOM are normal. Pupils are equal, round, and reactive to light.   Neck: Normal range of motion. Neck supple.   Cardiovascular: Normal rate, regular rhythm, normal heart sounds and intact distal pulses.    Pulmonary/Chest: Effort normal and breath sounds normal. No respiratory distress.   Abdominal: Soft. Bowel sounds are normal.       Right Side Rheumatological Exam     Muscle Strength (0-5 scale):  Neck Flexion:  4.8  Neck Extension: 5  Deltoid:  4.2  Biceps: 5/5   Triceps:  5  : 5/5   Iliopsoas: 4.6  Quadriceps:  5   Distal Lower Extremity: 5    Left Side Rheumatological Exam     Muscle Strength (0-5 scale):  Neck Flexion:  4.8  Neck  Extension: 5  Deltoid:  4.2  Biceps: 5/5   Triceps:  5  :  5/5   Iliopsoas: 4.6  Quadriceps:  5   Distal Lower Extremity: 5      Neurological: She is alert and oriented to person, place, and time. GCS score is 15.   Skin: Skin is warm and dry. No rash noted.     Hyperpigmented non raised rash over nape of neck, elbows, knuckles (resemble Gottron's papules), thighs, and ankles.        Psychiatric: Mood, memory, affect and judgment normal.   Musculoskeletal: Normal range of motion. She exhibits edema. She exhibits no tenderness or deformity.   No signs of synovitis. ROM intact   Inability to move bilateral UE above 45 degrees simultaneously.  Able to move a little past 120 on each one alone           Significant Labs:  Recent Results (from the past 48 hour(s))   Comprehensive Metabolic Panel (CMP)    Collection Time: 01/30/19  5:45 AM   Result Value Ref Range    Sodium 134 (L) 136 - 145 mmol/L    Potassium 3.6 3.5 - 5.1 mmol/L    Chloride 100 95 - 110 mmol/L    CO2 28 23 - 29 mmol/L    Glucose 144 (H) 70 - 110 mg/dL    BUN, Bld 24 (H) 6 - 20 mg/dL    Creatinine 0.7 0.5 - 1.4 mg/dL    Calcium 8.8 8.7 - 10.5 mg/dL    Total Protein 6.7 6.0 - 8.4 g/dL    Albumin 2.8 (L) 3.5 - 5.2 g/dL    Total Bilirubin 0.6 0.1 - 1.0 mg/dL    Alkaline Phosphatase 68 55 - 135 U/L     (H) 10 - 40 U/L    ALT 57 (H) 10 - 44 U/L    Anion Gap 6 (L) 8 - 16 mmol/L    eGFR if African American >60.0 >60 mL/min/1.73 m^2    eGFR if non African American >60.0 >60 mL/min/1.73 m^2   Magnesium    Collection Time: 01/30/19  5:45 AM   Result Value Ref Range    Magnesium 2.4 1.6 - 2.6 mg/dL   Phosphorus    Collection Time: 01/30/19  5:45 AM   Result Value Ref Range    Phosphorus 3.2 2.7 - 4.5 mg/dL   CK    Collection Time: 01/30/19  5:45 AM   Result Value Ref Range    CPK 1424 (H) 20 - 180 U/L   CBC auto differential    Collection Time: 01/30/19  5:45 AM   Result Value Ref Range    WBC 9.70 3.90 - 12.70 K/uL    RBC 3.56 (L) 4.00 - 5.40 M/uL     Hemoglobin 9.7 (L) 12.0 - 16.0 g/dL    Hematocrit 29.6 (L) 37.0 - 48.5 %    MCV 83 82 - 98 fL    MCH 27.2 27.0 - 31.0 pg    MCHC 32.8 32.0 - 36.0 g/dL    RDW 17.8 (H) 11.5 - 14.5 %    Platelets 146 (L) 150 - 350 K/uL    MPV 9.4 9.2 - 12.9 fL    Immature Granulocytes 0.9 (H) 0.0 - 0.5 %    Gran # (ANC) 8.6 (H) 1.8 - 7.7 K/uL    Immature Grans (Abs) 0.09 (H) 0.00 - 0.04 K/uL    Lymph # 0.5 (L) 1.0 - 4.8 K/uL    Mono # 0.5 0.3 - 1.0 K/uL    Eos # 0.0 0.0 - 0.5 K/uL    Baso # 0.01 0.00 - 0.20 K/uL    nRBC 0 0 /100 WBC    Gran% 89.0 (H) 38.0 - 73.0 %    Lymph% 4.7 (L) 18.0 - 48.0 %    Mono% 5.3 4.0 - 15.0 %    Eosinophil% 0.0 0.0 - 8.0 %    Basophil% 0.1 0.0 - 1.9 %    Differential Method Automated    Comprehensive Metabolic Panel (CMP)    Collection Time: 01/31/19  4:00 AM   Result Value Ref Range    Sodium 135 (L) 136 - 145 mmol/L    Potassium 3.7 3.5 - 5.1 mmol/L    Chloride 101 95 - 110 mmol/L    CO2 28 23 - 29 mmol/L    Glucose 157 (H) 70 - 110 mg/dL    BUN, Bld 24 (H) 6 - 20 mg/dL    Creatinine 0.6 0.5 - 1.4 mg/dL    Calcium 8.5 (L) 8.7 - 10.5 mg/dL    Total Protein 6.9 6.0 - 8.4 g/dL    Albumin 2.7 (L) 3.5 - 5.2 g/dL    Total Bilirubin 0.5 0.1 - 1.0 mg/dL    Alkaline Phosphatase 66 55 - 135 U/L     (H) 10 - 40 U/L    ALT 52 (H) 10 - 44 U/L    Anion Gap 6 (L) 8 - 16 mmol/L    eGFR if African American >60.0 >60 mL/min/1.73 m^2    eGFR if non African American >60.0 >60 mL/min/1.73 m^2   Magnesium    Collection Time: 01/31/19  4:00 AM   Result Value Ref Range    Magnesium 2.3 1.6 - 2.6 mg/dL   Phosphorus    Collection Time: 01/31/19  4:00 AM   Result Value Ref Range    Phosphorus 2.1 (L) 2.7 - 4.5 mg/dL   CK    Collection Time: 01/31/19  4:00 AM   Result Value Ref Range    CPK 1147 (H) 20 - 180 U/L   CBC auto differential    Collection Time: 01/31/19  4:00 AM   Result Value Ref Range    WBC 10.12 3.90 - 12.70 K/uL    RBC 3.52 (L) 4.00 - 5.40 M/uL    Hemoglobin 9.6 (L) 12.0 - 16.0 g/dL    Hematocrit 30.6 (L)  37.0 - 48.5 %    MCV 87 82 - 98 fL    MCH 27.3 27.0 - 31.0 pg    MCHC 31.4 (L) 32.0 - 36.0 g/dL    RDW 18.0 (H) 11.5 - 14.5 %    Platelets 151 150 - 350 K/uL    MPV 9.7 9.2 - 12.9 fL    Immature Granulocytes 0.8 (H) 0.0 - 0.5 %    Gran # (ANC) 9.0 (H) 1.8 - 7.7 K/uL    Immature Grans (Abs) 0.08 (H) 0.00 - 0.04 K/uL    Lymph # 0.4 (L) 1.0 - 4.8 K/uL    Mono # 0.6 0.3 - 1.0 K/uL    Eos # 0.0 0.0 - 0.5 K/uL    Baso # 0.01 0.00 - 0.20 K/uL    nRBC 0 0 /100 WBC    Gran% 89.3 (H) 38.0 - 73.0 %    Lymph% 4.2 (L) 18.0 - 48.0 %    Mono% 5.6 4.0 - 15.0 %    Eosinophil% 0.0 0.0 - 8.0 %    Basophil% 0.1 0.0 - 1.9 %    Differential Method Automated      Results for ROMEO PEREZ (MRN [id number]) as of 1/31/2019 12:15   Ref. Range 1/27/2019 04:21 1/28/2019 04:00 1/29/2019 05:00 1/30/2019 05:45 1/31/2019 04:00   CPK Latest Ref Range: 20 - 180 U/L 2076 (H) 2249 (H) 1789 (H) 1424 (H) 1147 (H)       Results for ROMEO PEREZ (MRN [id number]) as of 1/28/2019 10:23   Ref. Range 1/24/2019 03:40 1/25/2019 04:20 1/26/2019 04:00 1/27/2019 04:21 1/28/2019 04:00   Aldolase Latest Ref Range: 1.2 - 7.6 U/L 12.7 (H) 14.2 (H) 13.7 (H) 13.1 (H) 15.4 (H)   Results for ROMEO PEREZ (MRN [id number]) as of 1/28/2019 10:23   Ref. Range 1/22/2019 22:24   Sed Rate Latest Ref Range: 0 - 36 mm/Hr 50 (H)     Results for ROMEO PEREZ (MRN [id number]) as of 1/28/2019 10:23   Ref. Range 1/22/2019 22:24   CRP Latest Ref Range: 0.0 - 8.2 mg/L 31.1 (H)      Results for ROMEO PEREZ (MRN [id number]) as of 1/28/2019 10:23   Ref. Range 1/22/2019 22:24 1/25/2019 17:33   DARCY HEP-2 Titer Unknown Positive 1:640 Sp...    Anti-SSA Antibody Latest Ref Range: 0.00 - 19.99 EU 0.49    Anti-SSA Interpretation Latest Ref Range: Negative  Negative    Anti-SSB Antibody Latest Ref Range: 0.00 - 19.99 EU 0.11    Anti-SSB Interpretation Latest Ref Range: Negative  Negative    ds DNA Ab Latest Ref Range: Negative 1:10  Negative 1:10    Anti Sm Antibody Latest Ref Range:  0.00 - 19.99 EU 0.58    Anti-Sm Interpretation Latest Ref Range: Negative  Negative    Anti Sm/RNP Antibody Latest Ref Range: 0.00 - 19.99 EU 0.62    Anti-Sm/RNP Interpretation Latest Ref Range: Negative  Negative    DARCY Screen Latest Ref Range: Negative <1:160  Positive (A)    Complement (C-3) Latest Ref Range: 50 - 180 mg/dL  106   Complement (C-4) Latest Ref Range: 11 - 44 mg/dL  29   Meg-1 Autoantibodies Latest Ref Range: <1.0 Index <1.00    Results for ROMEO PEREZ (MRN [id number]) as of 1/28/2019 10:23   Ref. Range 1/23/2019 02:55 1/23/2019 02:56   Specimen UA Unknown Urine, Clean Catch    Color, UA Latest Ref Range: Yellow, Straw, [place]  Colorless (A)    pH, UA Latest Ref Range: 5.0 - 8.0  6.0    Specific Gravity, UA Latest Ref Range: 1.005 - 1.030  1.005    Appearance, UA Latest Ref Range: Clear  Clear    Protein, UA Latest Ref Range: Negative  Negative    Glucose, UA Latest Ref Range: Negative  Negative    Ketones, UA Latest Ref Range: Negative  Negative    Occult Blood UA Latest Ref Range: Negative  Negative    Nitrite, UA Latest Ref Range: Negative  Negative    Bilirubin (UA) Latest Ref Range: Negative  Negative    Leukocytes, UA Latest Ref Range: Negative  Trace (A)    RBC, UA Latest Ref Range: 0 - 4 /hpf  2   WBC, UA Latest Ref Range: 0 - 5 /hpf  3   Squam Epithel, UA Latest Units: /hpf  0   Microscopic Comment Unknown  SEE COMMENT     Meg-1: Negative    Significant Imaging:  MRI Humerus w/ and w/o contrast:  Impression       1. Diffuse edema and enhancement of the visualized left upper extremity musculature, most pronounced proximally, most notably of the deltoid and biceps musculature as detailed above.  Findings are nonspecific although could relate to a nonspecific myelitis particularly in light of patient's reported history.  Clinical correlation with appropriate history and lab markers advised.  2. No evidence of abscess or osteomyelitis.  This report was flagged in Epic as abnormal.     NM PET  CT 12/27/18:  Impression       See above    Significant improvement in all the previously seen lymph nodes involving the left lower neck, supraclavicular region, axillary and retropectoral region.    Index left retropectoral SUV max 3.57, previously 27.2.     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 Biopsied.                        - Small hiatal hernia.                        - Normal stomach.                        - Normal examined duodenum.  Recommendation:       - Return patient to hospital to for ongoing care.                        - Await pathology results.                        - Use a proton pump inhibitor PO BID.                        - The findings and recommendations were discussed                         with the designated responsible adult.                        - Repeat upper endoscopy in 8 weeks to check                         healing.           Assessment/Plan:     * Myositis    59yo F with history of L sided infiltrating ductal carcinoma of the L breast (dx on Jan 2017), HTN, depression, and gastritis was send from hem/onc clinic for evaluation of possible inflammatory myositis.     Patient started on Atelizumab/Abraxane on 11/7/18. Per chart review, patient noticed rashes on the dorsum of her hands on 11/11/18. Seen in urgent care and given topical steroid cream. Then received two more infusions on Atelizumab/Abraxane on 11/21/18 and 12/5/18. Started to notice puffiness around the eyes on 12/11/18. Received another Abraxane infusion on 12/12/18 but this time with hydrocortisone 50 mg which helped reduce the swelling around the eyes. Developed swelling of the face again on 12/15/18. Next infusion of Abraxane done on 12/19/18 with Solucortef and Atelizumab held. Abraxane given again on 1/3/19 with no IV steroid. Patient developed swelling of the face on 1/4/19 and went to urgent  "care and given short course of prednisone 20 mg BID. On 1/15/19, patient seen in hem/onc clinic with c/o of pressure and tightness around neck. CT scan of chest and neck did not reveal any vascular compression. Started on prednisone 60 mg with taper. On 1/22/18 patient with c/o proximal muscle weakness. CPK found to be elevated around 4k. Patient admitted and given solumedrol 80 mg IV on 1/22/18. Last infusion of Atelizumab on 12/19/18. Last infusion of Abraxane on 1/3/19. Given Solumedrol 1g x 1 on 1/23/18. Seen by Dermatology on 1/24/18 and biopsy done of skin rash. Given distribution of rashes, there was concern for dermatomyositis. Patient started on Solumedrol 125 mg IV BID at this time.      Labs: ESR 50, CRP 31.1, CPK 4562 (trending down), Aldolase 13.6.  , ALT 64.  UA normal.  CBC unremarkable.  +DARCY 1:640 speckled with negative profile.  Complements normal. Normal GGT. RPR, HIV negative. Hep B/C negative.    Spirometry: normal, normal diffusion capacity. FVC: 81%, DLCO: 114%    Case reports of docetaxel related inflammatory myositis had been documented. "...taxanes have been noted to cause disabling but transient arthralgia and myalgias; it is important to consider the possibility of inflammatory myopathy as a possible complication in patients undergoing treatment with these agents." - A case of docetaxel induced myositis and review of literature. Austin et al 2015.    Case reports in Rheumatology   Case reports of atezolizumb (immunomodulator) cause of myositis  - Lucy et al 2017 "Myositis as an adverse even of immune checkpoint blockade for cancer therapy.  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   Dermatomyositis can be associated with malignancy.  MRI of LUE showed edema of the deltoid and biceps.  Exam with proximal muscle weakness: b/l deltoids 4/5, b/l iliopsoas 4.4/5. Skin biopsy from 1/24/19 revealing " vacuolar interface dermatitis consistent with dermatomyositis.      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      Inpatient treatments so far:  - Solumedrol 80 mg IV x 1 on 1/22/19  - Solumedrol 1 g IV x 1 on 1/23/19.  - Solumedrol 125 mg IV BID since 1/24/19 -->being tapered down.     Plan:  - chemotherapy can be re-started as it may help treat Dermatomyositis. Would recommend against checkpoint inhibitor, however, as it may have been a trigger for the dermatomyositis  - continue to monitor CPK, aldolase, AST/ALT  - Decrease Solumderol to 40mg IV q12h.  - continue IVIg 400 mg/kg daily x 5 days    - MTX 15 mg sc weekly with daily folic acid.   - recommend ID fast track consult for vaccinations.  - f/u Quant TB, Strongyloides   - f/u esophageal biopsy results  - start physical therapy.   - f/u myomarker panel and HMGCR   - f/u with Dr. Swift and Dr. Campos in Rheumatology at discharge.           Wilner Reynolds MD  Rheumatology  [facility]        I  Have personally take the history and examined the patient and agree with fellow's note as stated above.      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

## 2019-01-31 NOTE — PLAN OF CARE
Problem: Adult Inpatient Plan of Care  Goal: Plan of Care Review  Outcome: Ongoing (interventions implemented as appropriate)  Uneventful shift. Pt has had no c/o pain this shift. Pt NPO. Pt has TF to R NG. Pt did not want to increase the rate. TF infusing at 40. Pt has had no c/o nausea. Pt has remained free from injury this shift. Bed in low locked position. Call light and personal belongings within reach. Side rails up x2. Nonskid socks in place. Pt instructed to call with any needs. Will continue to monitor.

## 2019-01-31 NOTE — PLAN OF CARE
Problem: SLP Goal  Goal: SLP Goal  Speech-Language Pathology Goals  Goals expected to be met by 2/2:   1) Pt will participate in ongoing swallow assessment to determine appropriateness of po diet.   2) Pt will participate in MBSS to further assess swallow function.  - met  3) Pt will participate in trial dysphagia tx in attempt to improve strength/safety  of swallow .  4) Pt / family education regarding dysphagia and aspiration risk.      Pt with slow progress towards goals     Kinsey Sorto MS, CCC-SLP  Speech Language Pathologist  Pager: [phone]  Date 1/31/2019

## 2019-01-31 NOTE — PT/OT/SLP PROGRESS
"Speech Language Pathology Treatment    Patient Name:  Ermelinda Verde   MRN:  [id number]  Admitting Diagnosis: Myositis    Recommendations:                 General Recommendations:  Dysphagia therapy  Diet recommendations:  NPO, Liquid Diet Level: NPO   Aspiration Precautions: Continue alternate means of nutrition and Strict aspiration precautions   General Precautions: Standard, aspiration, NPO, fall  Communication strategies:  none    Subjective     Pt awake and alert seated in chair; spouse at the bedside   "I just finished some ice"     Pain/Comfort:  Pain Rating 1: 0/10  Pain Rating Post-Intervention 1: 0/10    Objective:     Has the patient been evaluated by SLP for swallowing?   Yes  Keep patient NPO? Yes   Current Respiratory Status: room air      SLP reviewed with pt and spouse mbss results using visual aid from PAC system . Spouse with multiple appropriate questions and SLP offered extensive education re: swallow anatomy and function as well as rationale for continuing NPO status at this time. SLP introduced swallow exercises including effortful swallow, ehsan maneuver and supraglottic swallow. SLP modeled each exercise and pt demonstrated fair ability to complete each exercise independently x2. To digital palpation pt with significantly diminished hyolaryngeal rise. Pt to remain NPO. Pt with evident impaired resonance and speech appearing muffled in nature (hypernasality).        SLP discussed with Dr. Reyna via telephone pt overall prognosis for recovery given clinical picture of swallow impairment. Dr. Reyna explained pt likely to fully recover and regain muscle strength in the future.  Dr. Reyna in agreement with continuing alternate means of nutrition at this time. SLP discussed pt not yet ready for a repeat MBSS. Dr. Reyna would like to repeat MBSS end of next week approx 2/7 to help determine least restrictive diet and/or need for more long term means of nutrition/hydration/medication.     Assessment: "     Ermelinda Verde is a 58 y.o. female with an SLP diagnosis of hypernasility  and Dysphagia.      Goals:   Multidisciplinary Problems     SLP Goals        Problem: SLP Goal    Goal Priority Disciplines Outcome   SLP Goal     SLP Ongoing (interventions implemented as appropriate)   Description:  Speech-Language Pathology Goals  Goals expected to be met by 2/2:   1) Pt will participate in ongoing swallow assessment to determine appropriateness of po diet.   2) Pt will participate in MBSS to further assess swallow function.  - met  3) Pt will participate in trial dysphagia tx in attempt to improve strength/safety  of swallow .  4) Pt / family education regarding dysphagia and aspiration risk.                     Plan:     · Patient to be seen:  4 x/week   · Plan of Care expires:  02/24/19  · Plan of Care reviewed with:  patient   · SLP Follow-Up:  Yes       Discharge recommendations:  (TBD)   Barriers to Discharge:  no oral means of nutrition/hydration established     Time Tracking:     SLP Treatment Date:   01/31/19  Speech Start Time:  1348  Speech Stop Time:  1411     Speech Total Time (min):  23 min    Billable Minutes: Treatment Swallowing Dysfunction 12 and Seld Care/Home Management Training 11    Kinsey Sorto CCC-SLP  01/31/2019

## 2019-01-31 NOTE — SUBJECTIVE & OBJECTIVE
Interval History: Patient seen at bedside. Strength improving as well as swallowing. Tolerated MTX. On day 3 of IVIG. Pending esophageal biopsy. Remains NPO. No new rashes. No trouble breathing, f/c, n/v/d. Started on vaccinations.     Current Facility-Administered Medications   Medication Frequency    acetaminophen tablet 650 mg Q4H PRN    acetaminophen tablet 650 mg Q24H    ALPRAZolam tablet 0.25 mg TID PRN    amLODIPine tablet 10 mg Daily    dextrose 50% injection 12.5 g PRN    dextrose 50% injection 25 g PRN    diphenhydrAMINE (BENADRYL) 50 mg in sodium chloride 0.9% 50 mL IVPB Q24H    enoxaparin injection 40 mg Daily    escitalopram oxalate tablet 10 mg Daily    famotidine (PF) injection 20 mg PRN    famotidine (PF) injection 20 mg Q24H    fluconazole tablet 200 mg Q24H    folic acid tablet 1 mg Daily    glucagon (human recombinant) injection 1 mg PRN    glucose chewable tablet 16 g PRN    glucose chewable tablet 24 g PRN    hepatitis B (HEPLISAV-B) 20 mcg/0.5 mL vaccine 0.5 mL vaccine x 1 dose    hydrALAZINE injection 10 mg Q6H PRN    Immune Globulin G (IGG)-PRO-IGA 10 % injection (Privigen) 10 % injection 30 g Q24H    insulin aspart U-100 pen 0-5 Units QID (AC + HS) PRN    methylPREDNISolone sodium succinate injection 50 mg Q12H    ondansetron disintegrating tablet 8 mg Q8H PRN    oxyCODONE immediate release tablet 5 mg Q6H PRN    pneumoc 13-brandin conj-dip cr(PF) (PREVNAR 13 (PF)) 0.5 mL vaccine x 1 dose    polyethylene glycol packet 17 g BID PRN    promethazine tablet 12.5 mg Q6H PRN    sodium chloride 0.9% flush 10 mL PRN    sodium chloride 0.9% flush 5 mL PRN     Objective:     Vital Signs (Most Recent):  Temp: 98.3 °F (36.8 °C) (01/31/19 1158)  Pulse: 89 (01/31/19 1158)  Resp: 18 (01/31/19 1158)  BP: 108/62 (01/31/19 1158)  SpO2: 97 % (01/31/19 1158)  O2 Device (Oxygen Therapy): room air (01/31/19 0900) Vital Signs (24h Range):  Temp:  [98.2 °F (36.8 °C)-99.1 °F (37.3 °C)] 98.3  °F (36.8 °C)  Pulse:  [81-94] 89  Resp:  [14-18] 18  SpO2:  [95 %-100 %] 97 %  BP: (108-155)/(62-82) 108/62     Weight: 78.8 kg (173 lb 11.2 oz) (01/31/19 0730)  Body mass index is 28.91 kg/m².  Body surface area is 1.9 meters squared.      Intake/Output Summary (Last 24 hours) at 1/31/2019 1214  Last data filed at 1/31/2019 0932  Gross per 24 hour   Intake 1059 ml   Output --   Net 1059 ml       Physical Exam   Constitutional: She is oriented to person, place, and time and well-developed, well-nourished, and in no distress. No distress.   HENT:   Head: Normocephalic and atraumatic.   Right Ear: External ear normal.   Left Ear: External ear normal.   Bilateral orbital edema with heliotropic rash - resolved   Eyes: Conjunctivae and EOM are normal. Pupils are equal, round, and reactive to light.   Neck: Normal range of motion. Neck supple.   Cardiovascular: Normal rate, regular rhythm, normal heart sounds and intact distal pulses.    Pulmonary/Chest: Effort normal and breath sounds normal. No respiratory distress.   Abdominal: Soft. Bowel sounds are normal.       Right Side Rheumatological Exam     Muscle Strength (0-5 scale):  Neck Flexion:  4.8  Neck Extension: 5  Deltoid:  4.2  Biceps: 5/5   Triceps:  5  : 5/5   Iliopsoas: 4.6  Quadriceps:  5   Distal Lower Extremity: 5    Left Side Rheumatological Exam     Muscle Strength (0-5 scale):  Neck Flexion:  4.8  Neck Extension: 5  Deltoid:  4.2  Biceps: 5/5   Triceps:  5  :  5/5   Iliopsoas: 4.6  Quadriceps:  5   Distal Lower Extremity: 5      Neurological: She is alert and oriented to person, place, and time. GCS score is 15.   Skin: Skin is warm and dry. No rash noted.     Hyperpigmented non raised rash over nape of neck, elbows, knuckles (resemble Gottron's papules), thighs, and ankles.        Psychiatric: Mood, memory, affect and judgment normal.   Musculoskeletal: Normal range of motion. She exhibits edema. She exhibits no tenderness or deformity.   No signs  of synovitis. ROM intact   Inability to move bilateral UE above 45 degrees simultaneously.  Able to move a little past 120 on each one alone           Significant Labs:  Recent Results (from the past 48 hour(s))   Comprehensive Metabolic Panel (CMP)    Collection Time: 01/30/19  5:45 AM   Result Value Ref Range    Sodium 134 (L) 136 - 145 mmol/L    Potassium 3.6 3.5 - 5.1 mmol/L    Chloride 100 95 - 110 mmol/L    CO2 28 23 - 29 mmol/L    Glucose 144 (H) 70 - 110 mg/dL    BUN, Bld 24 (H) 6 - 20 mg/dL    Creatinine 0.7 0.5 - 1.4 mg/dL    Calcium 8.8 8.7 - 10.5 mg/dL    Total Protein 6.7 6.0 - 8.4 g/dL    Albumin 2.8 (L) 3.5 - 5.2 g/dL    Total Bilirubin 0.6 0.1 - 1.0 mg/dL    Alkaline Phosphatase 68 55 - 135 U/L     (H) 10 - 40 U/L    ALT 57 (H) 10 - 44 U/L    Anion Gap 6 (L) 8 - 16 mmol/L    eGFR if African American >60.0 >60 mL/min/1.73 m^2    eGFR if non African American >60.0 >60 mL/min/1.73 m^2   Magnesium    Collection Time: 01/30/19  5:45 AM   Result Value Ref Range    Magnesium 2.4 1.6 - 2.6 mg/dL   Phosphorus    Collection Time: 01/30/19  5:45 AM   Result Value Ref Range    Phosphorus 3.2 2.7 - 4.5 mg/dL   CK    Collection Time: 01/30/19  5:45 AM   Result Value Ref Range    CPK 1424 (H) 20 - 180 U/L   CBC auto differential    Collection Time: 01/30/19  5:45 AM   Result Value Ref Range    WBC 9.70 3.90 - 12.70 K/uL    RBC 3.56 (L) 4.00 - 5.40 M/uL    Hemoglobin 9.7 (L) 12.0 - 16.0 g/dL    Hematocrit 29.6 (L) 37.0 - 48.5 %    MCV 83 82 - 98 fL    MCH 27.2 27.0 - 31.0 pg    MCHC 32.8 32.0 - 36.0 g/dL    RDW 17.8 (H) 11.5 - 14.5 %    Platelets 146 (L) 150 - 350 K/uL    MPV 9.4 9.2 - 12.9 fL    Immature Granulocytes 0.9 (H) 0.0 - 0.5 %    Gran # (ANC) 8.6 (H) 1.8 - 7.7 K/uL    Immature Grans (Abs) 0.09 (H) 0.00 - 0.04 K/uL    Lymph # 0.5 (L) 1.0 - 4.8 K/uL    Mono # 0.5 0.3 - 1.0 K/uL    Eos # 0.0 0.0 - 0.5 K/uL    Baso # 0.01 0.00 - 0.20 K/uL    nRBC 0 0 /100 WBC    Gran% 89.0 (H) 38.0 - 73.0 %    Lymph%  4.7 (L) 18.0 - 48.0 %    Mono% 5.3 4.0 - 15.0 %    Eosinophil% 0.0 0.0 - 8.0 %    Basophil% 0.1 0.0 - 1.9 %    Differential Method Automated    Comprehensive Metabolic Panel (CMP)    Collection Time: 01/31/19  4:00 AM   Result Value Ref Range    Sodium 135 (L) 136 - 145 mmol/L    Potassium 3.7 3.5 - 5.1 mmol/L    Chloride 101 95 - 110 mmol/L    CO2 28 23 - 29 mmol/L    Glucose 157 (H) 70 - 110 mg/dL    BUN, Bld 24 (H) 6 - 20 mg/dL    Creatinine 0.6 0.5 - 1.4 mg/dL    Calcium 8.5 (L) 8.7 - 10.5 mg/dL    Total Protein 6.9 6.0 - 8.4 g/dL    Albumin 2.7 (L) 3.5 - 5.2 g/dL    Total Bilirubin 0.5 0.1 - 1.0 mg/dL    Alkaline Phosphatase 66 55 - 135 U/L     (H) 10 - 40 U/L    ALT 52 (H) 10 - 44 U/L    Anion Gap 6 (L) 8 - 16 mmol/L    eGFR if African American >60.0 >60 mL/min/1.73 m^2    eGFR if non African American >60.0 >60 mL/min/1.73 m^2   Magnesium    Collection Time: 01/31/19  4:00 AM   Result Value Ref Range    Magnesium 2.3 1.6 - 2.6 mg/dL   Phosphorus    Collection Time: 01/31/19  4:00 AM   Result Value Ref Range    Phosphorus 2.1 (L) 2.7 - 4.5 mg/dL   CK    Collection Time: 01/31/19  4:00 AM   Result Value Ref Range    CPK 1147 (H) 20 - 180 U/L   CBC auto differential    Collection Time: 01/31/19  4:00 AM   Result Value Ref Range    WBC 10.12 3.90 - 12.70 K/uL    RBC 3.52 (L) 4.00 - 5.40 M/uL    Hemoglobin 9.6 (L) 12.0 - 16.0 g/dL    Hematocrit 30.6 (L) 37.0 - 48.5 %    MCV 87 82 - 98 fL    MCH 27.3 27.0 - 31.0 pg    MCHC 31.4 (L) 32.0 - 36.0 g/dL    RDW 18.0 (H) 11.5 - 14.5 %    Platelets 151 150 - 350 K/uL    MPV 9.7 9.2 - 12.9 fL    Immature Granulocytes 0.8 (H) 0.0 - 0.5 %    Gran # (ANC) 9.0 (H) 1.8 - 7.7 K/uL    Immature Grans (Abs) 0.08 (H) 0.00 - 0.04 K/uL    Lymph # 0.4 (L) 1.0 - 4.8 K/uL    Mono # 0.6 0.3 - 1.0 K/uL    Eos # 0.0 0.0 - 0.5 K/uL    Baso # 0.01 0.00 - 0.20 K/uL    nRBC 0 0 /100 WBC    Gran% 89.3 (H) 38.0 - 73.0 %    Lymph% 4.2 (L) 18.0 - 48.0 %    Mono% 5.6 4.0 - 15.0 %    Eosinophil%  0.0 0.0 - 8.0 %    Basophil% 0.1 0.0 - 1.9 %    Differential Method Automated      Results for ROMEO PEREZ (MRN [id number]) as of 1/31/2019 12:15   Ref. Range 1/27/2019 04:21 1/28/2019 04:00 1/29/2019 05:00 1/30/2019 05:45 1/31/2019 04:00   CPK Latest Ref Range: 20 - 180 U/L 2076 (H) 2249 (H) 1789 (H) 1424 (H) 1147 (H)       Results for ROMEO PEREZ (MRN [id number]) as of 1/28/2019 10:23   Ref. Range 1/24/2019 03:40 1/25/2019 04:20 1/26/2019 04:00 1/27/2019 04:21 1/28/2019 04:00   Aldolase Latest Ref Range: 1.2 - 7.6 U/L 12.7 (H) 14.2 (H) 13.7 (H) 13.1 (H) 15.4 (H)   Results for ROMEO PEREZ (MRN [id number]) as of 1/28/2019 10:23   Ref. Range 1/22/2019 22:24   Sed Rate Latest Ref Range: 0 - 36 mm/Hr 50 (H)     Results for ROMEO PEREZ (MRN [id number]) as of 1/28/2019 10:23   Ref. Range 1/22/2019 22:24   CRP Latest Ref Range: 0.0 - 8.2 mg/L 31.1 (H)      Results for ROMEO PEREZ (MRN [id number]) as of 1/28/2019 10:23   Ref. Range 1/22/2019 22:24 1/25/2019 17:33   DARCY HEP-2 Titer Unknown Positive 1:640 Sp...    Anti-SSA Antibody Latest Ref Range: 0.00 - 19.99 EU 0.49    Anti-SSA Interpretation Latest Ref Range: Negative  Negative    Anti-SSB Antibody Latest Ref Range: 0.00 - 19.99 EU 0.11    Anti-SSB Interpretation Latest Ref Range: Negative  Negative    ds DNA Ab Latest Ref Range: Negative 1:10  Negative 1:10    Anti Sm Antibody Latest Ref Range: 0.00 - 19.99 EU 0.58    Anti-Sm Interpretation Latest Ref Range: Negative  Negative    Anti Sm/RNP Antibody Latest Ref Range: 0.00 - 19.99 EU 0.62    Anti-Sm/RNP Interpretation Latest Ref Range: Negative  Negative    DARCY Screen Latest Ref Range: Negative <1:160  Positive (A)    Complement (C-3) Latest Ref Range: 50 - 180 mg/dL  106   Complement (C-4) Latest Ref Range: 11 - 44 mg/dL  29   Meg-1 Autoantibodies Latest Ref Range: <1.0 Index <1.00    Results for ROMEO PEREZ (MRN [id number]) as of 1/28/2019 10:23   Ref. Range 1/23/2019 02:55 1/23/2019 02:56    Specimen UA Unknown Urine, Clean Catch    Color, UA Latest Ref Range: Yellow, Straw, [place]  Colorless (A)    pH, UA Latest Ref Range: 5.0 - 8.0  6.0    Specific Gravity, UA Latest Ref Range: 1.005 - 1.030  1.005    Appearance, UA Latest Ref Range: Clear  Clear    Protein, UA Latest Ref Range: Negative  Negative    Glucose, UA Latest Ref Range: Negative  Negative    Ketones, UA Latest Ref Range: Negative  Negative    Occult Blood UA Latest Ref Range: Negative  Negative    Nitrite, UA Latest Ref Range: Negative  Negative    Bilirubin (UA) Latest Ref Range: Negative  Negative    Leukocytes, UA Latest Ref Range: Negative  Trace (A)    RBC, UA Latest Ref Range: 0 - 4 /hpf  2   WBC, UA Latest Ref Range: 0 - 5 /hpf  3   Squam Epithel, UA Latest Units: /hpf  0   Microscopic Comment Unknown  SEE COMMENT     Meg-1: Negative    Significant Imaging:  MRI Humerus w/ and w/o contrast:  Impression       1. Diffuse edema and enhancement of the visualized left upper extremity musculature, most pronounced proximally, most notably of the deltoid and biceps musculature as detailed above.  Findings are nonspecific although could relate to a nonspecific myelitis particularly in light of patient's reported history.  Clinical correlation with appropriate history and lab markers advised.  2. No evidence of abscess or osteomyelitis.  This report was flagged in Epic as abnormal.     NM PET CT 12/27/18:  Impression       See above    Significant improvement in all the previously seen lymph nodes involving the left lower neck, supraclavicular region, axillary and retropectoral region.    Index left retropectoral SUV max 3.57, previously 27.2.     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 Biopsied.                        - Small hiatal hernia.                        - Normal stomach.                        - Normal examined  duodenum.  Recommendation:       - Return patient to hospital to for ongoing care.                        - Await pathology results.                        - Use a proton pump inhibitor PO BID.                        - The findings and recommendations were discussed                         with the designated responsible adult.                        - Repeat upper endoscopy in 8 weeks to check                         healing.

## 2019-01-31 NOTE — PROGRESS NOTES
[facility]  Hematology/Oncology  Progress Note    Patient Name: Ermelinda Verde  Admission Date: 1/22/2019  Hospital Length of Stay: 9 days  Code Status: Full Code     Subjective:     HPI:  Ms Verde is a 57 yo F with a prior diagnosis of L sided breast cancer, s/p 3 cycles of nab-paclitaxel and atezolizumab who presents from clinic with a roughly week long, multi-day history of proximal muscle weakness in the shoulder girdle muscles, bilateral and associated with mild tenderness. She reports generalized weakness, but strength impairment with the use of her upper extremities more than lower extremities, and her ADLs are intact. Her last PET scan in December 2018 showed almost complete resolution of her adenopathy, and she had been complaining of a rash, which had been attributed to Nab paclitaxel. Last week, 1/15, she had a CT neck/ since there was concern for facial swelling per symptoms, and the CT showed lymphadenopathy without airway or vascular compromise. 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 Most recently, the patient had been on 60mg of prednisone with a taper and had noted swelling, proximal weakness. She also notes some progressive swallowing difficulty, but attributes this to candidal thrush for which she takes nystatin scheduled. She reports poor PO intake preceding admission, dark, mario colored urine, but denies fevers or joint pain.      Onc History per Dr. Reyna:   She developed a palpable abnormality in her left breast in January 2017 which she noted on self-examination.  A diagnostic mammogram on January 19 showed a greater than 1 cm nodule in the upper outer portion of left breast.  By ultrasound this was lobulated and hypoechoic measuring 1.75 x 1.51 x 1.96 cm.     On January 24,  2017 a core needle biopsy was performed which showed infiltrating ductal carcinoma, high grade.  The tumor was ER negative, W[place] negative, and HER-2 negative.  A follow-up ultrasound on December 6 showed 2.5 x 2.2 x 1.5 cm left breast mass.  There was no abnormality noted in the left axilla.     She underwent sentinel lymph node biopsy on February 22.  That showed 4 negative lymph nodes.     She had 4 cycles of  Wilbur-adjuvantTaxotere and Cytoxan completed  on 5/9/17.     On June 26 she underwent left mastectomy.  That revealed 2 foci of invasive high-grade carcinoma measuring 14 mm and 1.5 mm.  Margins were negative.     She completed 4 cycles of adjuvant Adriamycin on September 12, 2017.     In October, she developed some left supraclavicular lymphadenopathy which turned out to be recurrence.     A fine-needle aspirate of the lymph node was performed on October 19th.  That showed metastatic carcinoma consistent with breast primary which was ER negative, WY negative and HER 2-negative.           Interval History: Endorses improving strength, and tenderness resolved. NAEO, throat irritation also improving. Plan for swallow trial tomorrow    Oncology Treatment Plan:   OP BREAST DOCETAXEL Q3W    Medications:  Continuous Infusions:    Scheduled Meds:   acetaminophen  650 mg Per NG tube Q24H    amLODIPine  10 mg Per NG tube Daily    diphenhydramine (BENADRYL) IVPB  50 mg Intravenous Q24H    enoxaparin  40 mg Subcutaneous Daily    escitalopram oxalate  10 mg Per NG tube Daily    famotidine (PF)  20 mg Intravenous Q24H    fluconazole  200 mg Per NG tube Q24H    folic acid  1 mg Oral Daily    Immune Globulin G (IGG)-PRO-IGA 10 % injection (Privigen)  400 mg/kg/day Intravenous Q24H    methylPREDNISolone sodium succinate  50 mg Intravenous Q12H     PRN Meds:acetaminophen, ALPRAZolam, dextrose 50%, dextrose 50%, famotidine (PF), glucagon (human recombinant), glucose, glucose, hepatitis B, hydrALAZINE, insulin aspart  U-100, ondansetron, oxyCODONE, pneumoc 13-brandin conj-dip cr(PF), polyethylene glycol, promethazine, sodium chloride 0.9%, sodium chloride 0.9%     Review of Systems   Constitutional: Positive for fatigue. Negative for activity change, appetite change, chills, diaphoresis, fever and unexpected weight change.   HENT: Positive for facial swelling and trouble swallowing. Negative for congestion, ear discharge, hearing loss, postnasal drip, sinus pressure, sneezing, sore throat and tinnitus.    Eyes: Negative for photophobia, pain and redness.   Respiratory: Negative for apnea, cough, choking, chest tightness, shortness of breath and stridor.    Cardiovascular: Negative for chest pain, palpitations and leg swelling.   Gastrointestinal: Negative for abdominal pain, anal bleeding, constipation, diarrhea, nausea and rectal pain.   Endocrine: Negative for polyuria.   Genitourinary: Negative for dysuria and hematuria.   Musculoskeletal: Positive for myalgias. Negative for arthralgias, back pain, gait problem, joint swelling, neck pain and neck stiffness.   Skin: Positive for rash. Negative for color change and pallor.   Allergic/Immunologic: Negative for immunocompromised state.   Neurological: Positive for weakness. Negative for dizziness, seizures and numbness.   Hematological: Positive for adenopathy. Does not bruise/bleed easily.   Psychiatric/Behavioral: Negative for agitation and behavioral problems.     Objective:     Vital Signs (Most Recent):  Temp: 98.7 °F (37.1 °C) (01/31/19 0900)  Pulse: 92 (01/31/19 0900)  Resp: 18 (01/31/19 0900)  BP: 123/64 (01/31/19 0900)  SpO2: 100 % (01/31/19 0900) Vital Signs (24h Range):  Temp:  [97.9 °F (36.6 °C)-99.1 °F (37.3 °C)] 98.7 °F (37.1 °C)  Pulse:  [81-94] 92  Resp:  [14-18] 18  SpO2:  [95 %-100 %] 100 %  BP: (123-155)/(64-82) 123/64     Weight: 78.8 kg (173 lb 11.2 oz)  Body mass index is 28.91 kg/m².  Body surface area is 1.9 meters squared.      Intake/Output Summary (Last 24  hours) at 1/31/2019 1110  Last data filed at 1/31/2019 0932  Gross per 24 hour   Intake 1059 ml   Output --   Net 1059 ml       Physical Exam   Constitutional: She is oriented to person, place, and time. She appears well-developed and well-nourished. No distress.   HENT:   Head: Atraumatic.   Nose: Nose normal.   Mouth/Throat: No oropharyngeal exudate (white tongue / exudate mostly resolved).   Face appears swollen   Eyes: Conjunctivae and EOM are normal. Pupils are equal, round, and reactive to light. Right eye exhibits no discharge. Left eye exhibits no discharge. No scleral icterus.   Neck: Normal range of motion. Neck supple. No tracheal deviation present.   Cardiovascular: Normal rate, regular rhythm and intact distal pulses. Exam reveals no gallop and no friction rub.   Pulmonary/Chest: Effort normal and breath sounds normal. No respiratory distress. She has no wheezes. She has no rales. She exhibits no tenderness.   Abdominal: Soft. Bowel sounds are normal. She exhibits no distension and no mass. There is no tenderness. There is no rebound and no guarding.   Musculoskeletal: Normal range of motion. She exhibits edema (facial edema, upper extremity edema b/l). She exhibits no tenderness or deformity.   Neurological: She is alert and oriented to person, place, and time. No cranial nerve deficit or sensory deficit.     4/5 shoulder strength on shoulder abduction; improving  5/5 flexion and extension preserved at elbow   Skin: Skin is warm and dry. Capillary refill takes less than 2 seconds. No rash noted. She is not diaphoretic. No erythema. No pallor.   Psychiatric: She has a normal mood and affect.       Significant Labs:   CBC:   Recent Labs   Lab 01/30/19  0545 01/31/19  0400   WBC 9.70 10.12   HGB 9.7* 9.6*   HCT 29.6* 30.6*   * 151    and CMP:   Recent Labs   Lab 01/30/19  0545 01/31/19  0400   * 135*   K 3.6 3.7    101   CO2 28 28   * 157*   BUN 24* 24*   CREATININE 0.7 0.6    CALCIUM 8.8 8.5*   PROT 6.7 6.9   ALBUMIN 2.8* 2.7*   BILITOT 0.6 0.5   ALKPHOS 68 66   * 118*   ALT 57* 52*   ANIONGAP 6* 6*   EGFRNONAA >60.0 >60.0       Diagnostic Results:  I have reviewed and interpreted all pertinent imaging results/findings within the past 24 hours.    Assessment/Plan:     * Myositis    - DDX dermatomyositis de haylie vs related to immunotherapy  - trend CPK daily; downtrending generally with a slight bump on 01/27  -restarted fluids 01/26 due to NPO  - myositis labs suspicious for myosits; rheum on board.   - F/u 1/24 skin biopsy. Pending results may need muscle biopsy by general surgery  -MRI left humerus suspicious for myositis    Per rheumatology  - Methylpred 125mg IV bid started, now tapered to 50mg IV bid  - Starting MTX 15mg subQ qWeekly  - IVIG 400mg/kg x5 days (1/31 is day 3/5)  - PFTs, myomarker panel, HMGCR, QG-TB pending  - Quantitative IgA 533, IgG 932, IgM 66  - ID consulted for fast track vacc  - f/u with Dr. Swift and Dr. Campos in Rheumatology at discharge         Hypokalemia    Replete prn     Dysphagia    Patient is experiencing difficulty swallowing that has been increasing in severity over last couple of days to the point where patient did not feel as if she could swallow.  As of 01/26, SLP evaluated patient and determined that she should be NPO except for medications until a modified barium swallow could be performed. Possibly candidal esophagitis; patient has oral thrush.    Plan:  - High aspiration risk on modified barium study, strict NPO  - NGT in situ, tube feeds initiated per dietary consult  - Switched to IV meds where possible  - Fluconazole IVPB 200mg q24hr  - GI consulted, EGD pending today to r/o candidal esophagitis vs dermatomyositis motility dysfunction --> resulted w/ Grade C erosive esophagitis, no candidal infxn identified. Bx obtained, GI recs start PPI bid  - Plan swallow trial tomorrow (2/1/2019)  - Will initiate PPI following IVIG  completion     Swelling of upper arm    -B/L U/S negative for DVTs  -appears less edematous than day prior  -continue to monitor     Dermatitis    - Skin biopsy from 1/24 pending, appreciate dermatology recs     Candidiasis    -cont Nystatin swish and swallow. Additionally, added fluconaozle 200 mg Po qday on 01/26 as possibility patient is experiencing esophagitis 2/2 to candida  -appears to be improving with fluconazole     Rhabdomyolysis    -see myositis. Continue supportive care, Cr/ electrolyte/ CPK monitoring and aggressive hydration. transaminitis on CMP is likely due to skeletal muscle rhabdo, not hepatic     Drug rash    - Unclear if symptoms related to chemotherapy     Pre-diabetes    -check a1c, 0-5u SSI while on steroids, add prandial insulin as warranted.      Adjustment disorder with depressed mood    -cont home SSRI     Vitamin B12 deficiency    -cont home B12 supplementation      Breast cancer of upper-outer quadrant of left female breast    -On January 24, 2017 a core needle biopsy was performed which showed infiltrating ductal carcinoma, high grade.  The tumor was ER negative, OR negative, and HER-2 negative.  -She had 4 cycles of  Wilbur-adjuvantTaxotere and Cytoxan completed  on 5/9/17.  -On June 26 she underwent left mastectomy.  That revealed 2 foci of invasive high-grade carcinoma measuring 14 mm and 1.5 mm.  Margins were negative.  -She completed 4 cycles of adjuvant Adriamycin on September 12, 2017.  -  A fine-needle aspirate of the lymph node was performed on October 19th.  That showed metastatic carcinoma consistent with breast primary which was ER negative, OR negative and HER 2-negative.    -Per rheumatology: hold paclitaxel agent and atezolizumb at this time - both can cause myositis      Hypertension    -on home amlodipine-benazepril; will hold ACEi for now and observe renal function with CPK elevation in rhabdo.     Plan:  - Amlodipine 10 mg qday on admission  - 1/28 Pt now strict NPO  due to failed swallow study, now on hydralazine 10mg IV q8h WCTM and adjust PRN              Noah Patel MD  Hematology/Oncology  [facility]

## 2019-02-01 PROBLEM — J98.8 CONGESTION OF UPPER AIRWAY: Status: ACTIVE | Noted: 2019-02-01

## 2019-02-01 LAB
ALBUMIN SERPL BCP-MCNC: 2.6 G/DL
ALDOLASE SERPL-CCNC: 3.1 U/L
ALDOLASE SERPL-CCNC: 8.1 U/L
ALP SERPL-CCNC: 69 U/L
ALT SERPL W/O P-5'-P-CCNC: 46 U/L
ANION GAP SERPL CALC-SCNC: 5 MMOL/L
AST SERPL-CCNC: 100 U/L
BASOPHILS # BLD AUTO: 0 K/UL
BASOPHILS NFR BLD: 0 %
BILIRUB SERPL-MCNC: 0.4 MG/DL
BUN SERPL-MCNC: 25 MG/DL
CALCIUM SERPL-MCNC: 8.5 MG/DL
CHLORIDE SERPL-SCNC: 101 MMOL/L
CK SERPL-CCNC: 827 U/L
CO2 SERPL-SCNC: 29 MMOL/L
CREAT SERPL-MCNC: 0.6 MG/DL
DIFFERENTIAL METHOD: ABNORMAL
EOSINOPHIL # BLD AUTO: 0 K/UL
EOSINOPHIL NFR BLD: 0 %
ERYTHROCYTE [DISTWIDTH] IN BLOOD BY AUTOMATED COUNT: 18.2 %
EST. GFR  (AFRICAN AMERICAN): >60 ML/MIN/1.73 M^2
EST. GFR  (NON AFRICAN AMERICAN): >60 ML/MIN/1.73 M^2
GLUCOSE SERPL-MCNC: 163 MG/DL
HCT VFR BLD AUTO: 29.6 %
HGB BLD-MCNC: 9.2 G/DL
HMGCR ANTIBODY: <3 UNITS (ref 0–19)
IMM GRANULOCYTES # BLD AUTO: 0.07 K/UL
IMM GRANULOCYTES NFR BLD AUTO: 0.8 %
LYMPHOCYTES # BLD AUTO: 0.4 K/UL
LYMPHOCYTES NFR BLD: 4.7 %
MAGNESIUM SERPL-MCNC: 2.1 MG/DL
MCH RBC QN AUTO: 26.7 PG
MCHC RBC AUTO-ENTMCNC: 31.1 G/DL
MCV RBC AUTO: 86 FL
MONOCYTES # BLD AUTO: 0.5 K/UL
MONOCYTES NFR BLD: 6.1 %
NEUTROPHILS # BLD AUTO: 7.3 K/UL
NEUTROPHILS NFR BLD: 88.4 %
NRBC BLD-RTO: 0 /100 WBC
PHOSPHATE SERPL-MCNC: 1.8 MG/DL
PLATELET # BLD AUTO: 139 K/UL
PMV BLD AUTO: 11 FL
POTASSIUM SERPL-SCNC: 4 MMOL/L
PROT SERPL-MCNC: 7.1 G/DL
RBC # BLD AUTO: 3.45 M/UL
SODIUM SERPL-SCNC: 135 MMOL/L
WBC # BLD AUTO: 8.24 K/UL

## 2019-02-01 PROCEDURE — 63600175 PHARM REV CODE 636 W HCPCS: Performed by: INTERNAL MEDICINE

## 2019-02-01 PROCEDURE — 63600175 PHARM REV CODE 636 W HCPCS: Performed by: STUDENT IN AN ORGANIZED HEALTH CARE EDUCATION/TRAINING PROGRAM

## 2019-02-01 PROCEDURE — 83735 ASSAY OF MAGNESIUM: CPT

## 2019-02-01 PROCEDURE — 99233 SBSQ HOSP IP/OBS HIGH 50: CPT | Mod: ,,, | Performed by: INTERNAL MEDICINE

## 2019-02-01 PROCEDURE — 84100 ASSAY OF PHOSPHORUS: CPT

## 2019-02-01 PROCEDURE — 63600175 PHARM REV CODE 636 W HCPCS: Mod: JG | Performed by: INTERNAL MEDICINE

## 2019-02-01 PROCEDURE — 99233 PR SUBSEQUENT HOSPITAL CARE,LEVL III: ICD-10-PCS | Mod: ,,, | Performed by: INTERNAL MEDICINE

## 2019-02-01 PROCEDURE — 25000003 PHARM REV CODE 250: Performed by: STUDENT IN AN ORGANIZED HEALTH CARE EDUCATION/TRAINING PROGRAM

## 2019-02-01 PROCEDURE — 82085 ASSAY OF ALDOLASE: CPT

## 2019-02-01 PROCEDURE — 25000003 PHARM REV CODE 250: Performed by: INTERNAL MEDICINE

## 2019-02-01 PROCEDURE — 85025 COMPLETE CBC W/AUTO DIFF WBC: CPT

## 2019-02-01 PROCEDURE — S0028 INJECTION, FAMOTIDINE, 20 MG: HCPCS | Performed by: STUDENT IN AN ORGANIZED HEALTH CARE EDUCATION/TRAINING PROGRAM

## 2019-02-01 PROCEDURE — 97110 THERAPEUTIC EXERCISES: CPT

## 2019-02-01 PROCEDURE — 20600001 HC STEP DOWN PRIVATE ROOM

## 2019-02-01 PROCEDURE — 82550 ASSAY OF CK (CPK): CPT

## 2019-02-01 PROCEDURE — 99231 SBSQ HOSP IP/OBS SF/LOW 25: CPT | Mod: ,,, | Performed by: INTERNAL MEDICINE

## 2019-02-01 PROCEDURE — 97116 GAIT TRAINING THERAPY: CPT

## 2019-02-01 PROCEDURE — 99231 PR SUBSEQUENT HOSPITAL CARE,LEVL I: ICD-10-PCS | Mod: ,,, | Performed by: INTERNAL MEDICINE

## 2019-02-01 PROCEDURE — 80053 COMPREHEN METABOLIC PANEL: CPT

## 2019-02-01 RX ORDER — SODIUM,POTASSIUM PHOSPHATES 280-250MG
2 POWDER IN PACKET (EA) ORAL EVERY 4 HOURS
Status: COMPLETED | OUTPATIENT
Start: 2019-02-01 | End: 2019-02-01

## 2019-02-01 RX ORDER — METHYLPREDNISOLONE SOD SUCC 125 MG
30 VIAL (EA) INJECTION EVERY 12 HOURS
Status: DISCONTINUED | OUTPATIENT
Start: 2019-02-01 | End: 2019-02-02

## 2019-02-01 RX ORDER — GUAIFENESIN 600 MG/1
600 TABLET, EXTENDED RELEASE ORAL 2 TIMES DAILY
Status: DISCONTINUED | OUTPATIENT
Start: 2019-02-01 | End: 2019-02-01

## 2019-02-01 RX ORDER — GUAIFENESIN 100 MG/5ML
200 SOLUTION ORAL EVERY 4 HOURS
Status: DISCONTINUED | OUTPATIENT
Start: 2019-02-01 | End: 2019-02-08

## 2019-02-01 RX ADMIN — ESCITALOPRAM OXALATE 10 MG: 5 TABLET, FILM COATED ORAL at 09:02

## 2019-02-01 RX ADMIN — METHYLPREDNISOLONE SODIUM SUCCINATE 40 MG: 125 INJECTION, POWDER, FOR SOLUTION INTRAMUSCULAR; INTRAVENOUS at 09:02

## 2019-02-01 RX ADMIN — GUAIFENESIN 200 MG: 200 SOLUTION ORAL at 05:02

## 2019-02-01 RX ADMIN — GUAIFENESIN 200 MG: 200 SOLUTION ORAL at 03:02

## 2019-02-01 RX ADMIN — ACETAMINOPHEN 650 MG: 325 TABLET, FILM COATED ORAL at 03:02

## 2019-02-01 RX ADMIN — FAMOTIDINE 20 MG: 10 INJECTION, SOLUTION INTRAVENOUS at 03:02

## 2019-02-01 RX ADMIN — FOLIC ACID 1 MG: 1 TABLET ORAL at 09:02

## 2019-02-01 RX ADMIN — METHYLPREDNISOLONE SODIUM SUCCINATE 30 MG: 125 INJECTION, POWDER, FOR SOLUTION INTRAMUSCULAR; INTRAVENOUS at 09:02

## 2019-02-01 RX ADMIN — AMLODIPINE BESYLATE 10 MG: 10 TABLET ORAL at 09:02

## 2019-02-01 RX ADMIN — DIPHENHYDRAMINE HYDROCHLORIDE 50 MG: 50 INJECTION, SOLUTION INTRAMUSCULAR; INTRAVENOUS at 03:02

## 2019-02-01 RX ADMIN — POTASSIUM & SODIUM PHOSPHATES POWDER PACK 280-160-250 MG 2 PACKET: 280-160-250 PACK at 11:02

## 2019-02-01 RX ADMIN — GUAIFENESIN 200 MG: 200 SOLUTION ORAL at 09:02

## 2019-02-01 RX ADMIN — HUMAN IMMUNOGLOBULIN G 30 G: 20 LIQUID INTRAVENOUS at 04:02

## 2019-02-01 RX ADMIN — ENOXAPARIN SODIUM 40 MG: 100 INJECTION SUBCUTANEOUS at 05:02

## 2019-02-01 RX ADMIN — POTASSIUM & SODIUM PHOSPHATES POWDER PACK 280-160-250 MG 2 PACKET: 280-160-250 PACK at 05:02

## 2019-02-01 RX ADMIN — POTASSIUM & SODIUM PHOSPHATES POWDER PACK 280-160-250 MG 2 PACKET: 280-160-250 PACK at 03:02

## 2019-02-01 RX ADMIN — FLUCONAZOLE 200 MG: 200 TABLET ORAL at 03:02

## 2019-02-01 NOTE — PLAN OF CARE
Problem: Physical Therapy Goal  Goal: Physical Therapy Goal  Goals to be met by: 19     Patient will increase functional independence with mobility by performin. Gait  x 500 feet with Supervision without device. - GOAL MET    2. Increased functional strength to 5/5 for BLE.  3. Standing lower extremity exercise program x 15 reps per handout, with supervision.  4. Pt to perf 6MWT: amb 580', to demonstrate a clinically significant improvement in aerobic capacity.     Outcome: Ongoing (interventions implemented as appropriate)  Progressing pts HEP to standing at this time.

## 2019-02-01 NOTE — PROGRESS NOTES
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]  Yes    Hypokalemia [E87.6]  Yes    Dysphagia [R13.10]  No    Swelling of upper arm [M79.89]  Yes    Dermatitis [L30.9]  Yes    Rhabdomyolysis [M62.82]  Yes    Candidiasis [B37.9]  Yes    Drug rash [L27.0]  Yes    Pre-diabetes [R73.03]  Yes    Adjustment disorder with depressed mood [F43.21]  Yes    Vitamin B12 deficiency [E53.8]  Yes    Breast cancer of upper-outer quadrant of left female breast [C50.412]  Yes     Chronic    Hypertension [I10]  Yes      Resolved Hospital Problems    Diagnosis Date Resolved POA    Hyponatremia [E87.1] 01/24/2019 Yes       Future Appointments   Date Time Provider Department Center   2/5/2019  9:40 AM LAB, HEMONC CANCER BLDG [facility] [place]   2/5/2019 10:45 AM Alex Reyna MD [facility] HEM ONC [place]   2/5/2019 11:30 AM [facility] [place]   2/15/2019  2:00 PM Clarissa Swift MD [facility] RHEUM [address]     Follow-up Information     Alex Reyna MD.    Specialties:  Hematology and Oncology, Hematology  Why:  As scheduled by the clinic  Contact information:  [address] [place] [address]  [phone]                     I have seen and examined this patient face to face today. My clinical findings that support the need for the home health skilled services and home bound status are the following:  Weakness/numbness causing balance and gait disturbance due to Malignancy/Cancer making it  taxing to leave home.  Medical restrictions requiring assistance of another human to leave home due to  Unstable ambulation and Decreased range of motions in extremities.    Allergies:Review of patient's allergies indicates:  No Known Allergies    Diet: regular diet    Activities: activity as tolerated    Nursing:   SN to complete comprehensive assessment including routine vital signs. Instruct on disease process and s/s of complications to report to MD. Review/verify medication list sent home with the patient at time of discharge  and instruct patient/caregiver as needed. Frequency may be adjusted depending on start of care date.    Notify MD if SBP > 160 or < 90; DBP > 90 or < 50; HR > 120 or < 50; Temp > 101; Other:         CONSULTS:    Physical Therapy to evaluate and treat. Evaluate for home safety and equipment needs; Establish/upgrade home exercise program. Perform / instruct on therapeutic exercises, gait training, transfer training, and Range of Motion.  Occupational Therapy to evaluate and treat. Evaluate home environment for safety and equipment needs. Perform/Instruct on transfers, ADL training, ROM, and therapeutic exercises.  Speech Therapy  to evaluate and treat for  Swallowing.  Aide to provide assistance with personal care, ADLs, and vital signs.    MISCELLANEOUS CARE:      WOUND CARE ORDERS  n/a      Medications: Review discharge medications with patient and family and provide education.      Current Discharge Medication List      CONTINUE these medications which have NOT CHANGED    Details   ALPRAZolam (XANAX) 0.25 MG tablet Take 1 tablet (0.25 mg total) by mouth 3 (three) times daily.  Qty: 75 tablet, Refills: 0    Associated Diagnoses: Adjustment disorder with anxiety      amlodipine-benazepril (LOTREL) 10-40 mg per capsule TK 1 C PO QD  Qty: 90 capsule, Refills: 3    Associated Diagnoses: Benign essential hypertension      CALCIUM CARBONATE (CALCIUM 500 ORAL) Take by mouth once daily.        cyanocobalamin (VITAMIN B-12) 500 MCG tablet Take 500 mcg by mouth once daily.      nystatin (MYCOSTATIN) 100,000 unit/mL suspension Take 5 mLs (500,000 Units total) by mouth 4 (four) times daily. for 10 days  Qty: 473 mL, Refills: 0    Associated Diagnoses: Oral thrush      vitamin D (VITAMIN D3) 1000 units Tab Take 1,000 Units by mouth once daily.      cetirizine (ZYRTEC) 10 MG tablet Take 1 tablet (10 mg total) by mouth once daily. for 10 days  Qty: 10 tablet, Refills: 0    Associated Diagnoses: Allergic reaction, initial encounter      escitalopram oxalate (LEXAPRO) 10 MG tablet Take 1 tablet (10 mg total) by mouth once daily.  Qty: 30 tablet, Refills: 2    Associated Diagnoses: Adjustment disorder with anxiety      famotidine (PEPCID) 40 MG tablet Take 1 tablet (40 mg total) by mouth every evening. for 10 days  Qty: 10 tablet, Refills: 0    Associated Diagnoses: Allergic reaction, initial encounter      fluticasone (FLONASE) 50 mcg/actuation nasal spray SHAKE LIQUID AND USE 1 SPRAY(50 MCG) IN EACH NOSTRIL EVERY DAY  Qty: 16 mL, Refills: 5    Associated Diagnoses: Tinnitus of both ears      predniSONE (DELTASONE) 20 MG tablet Take 3 tablets daily for 2 days, 2 tablets daily for 2 days then 1 tablet daily  Qty: 14 tablet, Refills: 0    Associated Diagnoses: Drug rash             I certify that this patient is confined to her home and needs intermittent skilled nursing care.

## 2019-02-01 NOTE — PLAN OF CARE
Problem: Adult Inpatient Plan of Care  Goal: Plan of Care Review  Outcome: Ongoing (interventions implemented as appropriate)  Afebrile. Free from falls or injury. No complaints of pain. Prednisone given as scheduled. NGT to right nare with tube feeding at 50ml/hr. Remains NPO except for ice chips. Suction at bedside, done per pt. Bed locked in lowest position, non skid socks on, call light within reach. Pt instructed to call if any assistance is needed. Vitals stable. Will cont to musa pt.

## 2019-02-01 NOTE — PT/OT/SLP PROGRESS
Speech Language Pathology      Ermelinda Verde  MRN: [id number]    Medical team contacted SLP to see if pt undergoing additional swallow assessment this date as pt was under the assumption she was going to have a repeat swallow study. SLP reviewed with MD conversation and plan from previous day, that pt had not yet demonstrated any clinical signs of improvement warranting a repeat MBSS. MBSS will likely not be conducted until end of next week  (approx 2/7) to help determine long term feeding plan.. MD reported this was also his understanding and appreciative of SLP clarification. SLP discussed pt plan of care remains 4x/weekly for treatment and to continue dysphagia exercises independently (which are written and at the bedside) outside of scheduled speech therapy sessions.  MD reports at this time pt continues to frequently suction secretions and in agreement with current plan. Speech will continue to monitor.     Kinsey Sorto, SHAD-SLP

## 2019-02-01 NOTE — PROGRESS NOTES
[facility]  Rheumatology  Progress Note    Patient Name: Ermelinda Verde  MRN: [id number]  Admission Date: 1/22/2019  Hospital Length of Stay: 10 days  Code Status: Full Code   Attending Provider: Alex Reyna MD  Primary Care Physician: Reta Weeks MD  Principal Problem: Myositis    Subjective:     HPI: 57yo F with history of L sided infiltrating ductal carcinoma of the L breast (dx on Jan 2017), HTN, depression, and gastritis was send from hem/onc clinic for evaluation of possible inflammatory myositis.     L breast cancer s/p completion of 4 cycles of demi-adjuvant taxotere and cytoxan (completed on 5/9/17) and mastectomy (6/26/17) and then 4 cycles of adjuvant Adriamycin (completed 9/12/17). In 10/2017 - patient developed L supraclavicular lymphadenopathy which FNA confirmed as reoccurrence of previously treated breast cancer.     Patient started on Atelizumab/Abraxane on 11/7/18. Per chart review, patient noticed rashes on the dorsum of her hands on 11/11/18. Seen in urgent care and given topical steroid cream. Then received two more infusions on Atelizumab/Abraxane on 11/21/18 and 12/5/18. Started to notice puffiness around the eyes on 12/11/18. Received another Abraxane infusion on 12/12/18 but this time with hydrocortisone 50 mg which helped reduce the swelling around the eyes. Developed swelling of the face again on 12/15/18. Next infusion of Abraxane done on 12/19/18 with Solucortef and Atelizumab held. Abraxane given again on 1/3/19 with no IV steroid. Patient developed swelling of the face on 1/4/19 and went to urgent care and given short course of prednisone 20 mg BID. On 1/15/19, patient seen in hem/onc clinic with c/o of pressure and tightness around neck. CT scan of chest and neck did not reveal any vascular compression. Started on prednisone 60 mg with taper. On 1/22/18 patient with c/o proximal muscle weakness. CPK found to be elevated around 4k. Patient admitted and given  solumedrol 80 mg IV on 1/22/18. Last infusion of Atelizumab on 12/19/18. Last infusion of Abraxane on 1/3/19. Given Solumedrol 1g x 1 on 1/23/18. Seen by Dermatology on 1/24/18 and biopsy done of skin rash. Given distribution of rashes, there was concern for dermatomyositis. Patient started on Solumedrol 125 mg IV BID at this time.     Denies any family history of autoimmune diseases.    No smoking, EtOH, recreational drug usage.    Denies any photosensitivity, joint swelling, unintentional weigh loss, abdominal pain, night sweats, CP, SOB.  +oral thrush.     Interval History: Patient seen at bedside. Strength improving as well as swallowing. Pending repeat swallow eval.  On day 4 of IVIG. Pending esophageal biopsy. Remains NPO. No new rashes. No trouble breathing, f/c, n/v/d. Started on vaccinations.     Current Facility-Administered Medications   Medication Frequency    acetaminophen tablet 650 mg Q4H PRN    acetaminophen tablet 650 mg Q24H    ALPRAZolam tablet 0.25 mg TID PRN    amLODIPine tablet 10 mg Daily    dextrose 50% injection 12.5 g PRN    dextrose 50% injection 25 g PRN    diphenhydrAMINE (BENADRYL) 50 mg in sodium chloride 0.9% 50 mL IVPB Q24H    enoxaparin injection 40 mg Daily    escitalopram oxalate tablet 10 mg Daily    famotidine (PF) injection 20 mg PRN    famotidine (PF) injection 20 mg Q24H    fluconazole tablet 200 mg Q24H    folic acid tablet 1 mg Daily    glucagon (human recombinant) injection 1 mg PRN    glucose chewable tablet 16 g PRN    glucose chewable tablet 24 g PRN    guaifenesin 100 mg/5 ml syrup 200 mg Q4H    hepatitis B (HEPLISAV-B) 20 mcg/0.5 mL vaccine 0.5 mL vaccine x 1 dose    hydrALAZINE injection 10 mg Q6H PRN    Immune Globulin G (IGG)-PRO-IGA 10 % injection (Privigen) 10 % injection 30 g Q24H    insulin aspart U-100 pen 0-5 Units QID (AC + HS) PRN    methylPREDNISolone sodium succinate injection 40 mg Q12H    ondansetron disintegrating tablet 8 mg Q8H  PRN    oxyCODONE immediate release tablet 5 mg Q6H PRN    pneumoc 13-brandin conj-dip cr(PF) (PREVNAR 13 (PF)) 0.5 mL vaccine x 1 dose    polyethylene glycol packet 17 g BID PRN    potassium, sodium phosphates 280-160-250 mg packet 2 packet Q4H    promethazine tablet 12.5 mg Q6H PRN    sodium chloride 0.9% flush 10 mL PRN    sodium chloride 0.9% flush 5 mL PRN     Objective:     Vital Signs (Most Recent):  Temp: 98.6 °F (37 °C) (02/01/19 0758)  Pulse: 97 (02/01/19 0758)  Resp: 16 (02/01/19 0758)  BP: 131/67 (02/01/19 0758)  SpO2: 98 % (02/01/19 0758)  O2 Device (Oxygen Therapy): room air (02/01/19 0758) Vital Signs (24h Range):  Temp:  [98.1 °F (36.7 °C)-98.9 °F (37.2 °C)] 98.6 °F (37 °C)  Pulse:  [84-97] 97  Resp:  [14-18] 16  SpO2:  [96 %-100 %] 98 %  BP: (108-142)/(62-73) 131/67     Weight: 76.2 kg (168 lb 1.6 oz) (02/01/19 0400)  Body mass index is 27.97 kg/m².  Body surface area is 1.87 meters squared.      Intake/Output Summary (Last 24 hours) at 2/1/2019 1058  Last data filed at 2/1/2019 1000  Gross per 24 hour   Intake 1661 ml   Output 1400 ml   Net 261 ml       Physical Exam   Constitutional: She is oriented to person, place, and time and well-developed, well-nourished, and in no distress. No distress.   HENT:   Head: Normocephalic and atraumatic.   Right Ear: External ear normal.   Left Ear: External ear normal.   Bilateral orbital edema with heliotropic rash - resolved   Eyes: Conjunctivae and EOM are normal. Pupils are equal, round, and reactive to light.   Neck: Normal range of motion. Neck supple.   Cardiovascular: Normal rate, regular rhythm, normal heart sounds and intact distal pulses.    Pulmonary/Chest: Effort normal and breath sounds normal. No respiratory distress.   Abdominal: Soft. Bowel sounds are normal.       Right Side Rheumatological Exam     Muscle Strength (0-5 scale):  Neck Flexion:  4.8  Neck Extension: 5  Deltoid:  4.2  Biceps: 5/5   Triceps:  4.6  : 5/5   Iliopsoas:  4.6  Quadriceps:  5   Distal Lower Extremity: 5    Left Side Rheumatological Exam     Muscle Strength (0-5 scale):  Neck Flexion:  4.8  Neck Extension: 5  Deltoid:  4.2  Biceps: 5/5   Triceps:  4.6  :  5/5   Iliopsoas: 4.6  Quadriceps:  5   Distal Lower Extremity: 5      Neurological: She is alert and oriented to person, place, and time. GCS score is 15.   Skin: Skin is warm and dry. No rash noted.     Hyperpigmented non raised rash over nape of neck, elbows, knuckles (resemble Gottron's papules), thighs, and ankles.        Psychiatric: Mood, memory, affect and judgment normal.   Musculoskeletal: Normal range of motion. She exhibits edema. She exhibits no tenderness or deformity.   No signs of synovitis. ROM intact   Inability to move bilateral UE above 45 degrees simultaneously.  Able to move a little past 120 on each one alone           Significant Labs:  Recent Results (from the past 48 hour(s))   Comprehensive Metabolic Panel (CMP)    Collection Time: 01/31/19  4:00 AM   Result Value Ref Range    Sodium 135 (L) 136 - 145 mmol/L    Potassium 3.7 3.5 - 5.1 mmol/L    Chloride 101 95 - 110 mmol/L    CO2 28 23 - 29 mmol/L    Glucose 157 (H) 70 - 110 mg/dL    BUN, Bld 24 (H) 6 - 20 mg/dL    Creatinine 0.6 0.5 - 1.4 mg/dL    Calcium 8.5 (L) 8.7 - 10.5 mg/dL    Total Protein 6.9 6.0 - 8.4 g/dL    Albumin 2.7 (L) 3.5 - 5.2 g/dL    Total Bilirubin 0.5 0.1 - 1.0 mg/dL    Alkaline Phosphatase 66 55 - 135 U/L     (H) 10 - 40 U/L    ALT 52 (H) 10 - 44 U/L    Anion Gap 6 (L) 8 - 16 mmol/L    eGFR if African American >60.0 >60 mL/min/1.73 m^2    eGFR if non African American >60.0 >60 mL/min/1.73 m^2   Magnesium    Collection Time: 01/31/19  4:00 AM   Result Value Ref Range    Magnesium 2.3 1.6 - 2.6 mg/dL   Phosphorus    Collection Time: 01/31/19  4:00 AM   Result Value Ref Range    Phosphorus 2.1 (L) 2.7 - 4.5 mg/dL   CK    Collection Time: 01/31/19  4:00 AM   Result Value Ref Range    CPK 1147 (H) 20 - 180 U/L    CBC auto differential    Collection Time: 01/31/19  4:00 AM   Result Value Ref Range    WBC 10.12 3.90 - 12.70 K/uL    RBC 3.52 (L) 4.00 - 5.40 M/uL    Hemoglobin 9.6 (L) 12.0 - 16.0 g/dL    Hematocrit 30.6 (L) 37.0 - 48.5 %    MCV 87 82 - 98 fL    MCH 27.3 27.0 - 31.0 pg    MCHC 31.4 (L) 32.0 - 36.0 g/dL    RDW 18.0 (H) 11.5 - 14.5 %    Platelets 151 150 - 350 K/uL    MPV 9.7 9.2 - 12.9 fL    Immature Granulocytes 0.8 (H) 0.0 - 0.5 %    Gran # (ANC) 9.0 (H) 1.8 - 7.7 K/uL    Immature Grans (Abs) 0.08 (H) 0.00 - 0.04 K/uL    Lymph # 0.4 (L) 1.0 - 4.8 K/uL    Mono # 0.6 0.3 - 1.0 K/uL    Eos # 0.0 0.0 - 0.5 K/uL    Baso # 0.01 0.00 - 0.20 K/uL    nRBC 0 0 /100 WBC    Gran% 89.3 (H) 38.0 - 73.0 %    Lymph% 4.2 (L) 18.0 - 48.0 %    Mono% 5.6 4.0 - 15.0 %    Eosinophil% 0.0 0.0 - 8.0 %    Basophil% 0.1 0.0 - 1.9 %    Differential Method Automated    Aldolase    Collection Time: 01/31/19  4:00 AM   Result Value Ref Range    Aldolase 3.1 1.2 - 7.6 U/L   Comprehensive Metabolic Panel (CMP)    Collection Time: 02/01/19  4:12 AM   Result Value Ref Range    Sodium 135 (L) 136 - 145 mmol/L    Potassium 4.0 3.5 - 5.1 mmol/L    Chloride 101 95 - 110 mmol/L    CO2 29 23 - 29 mmol/L    Glucose 163 (H) 70 - 110 mg/dL    BUN, Bld 25 (H) 6 - 20 mg/dL    Creatinine 0.6 0.5 - 1.4 mg/dL    Calcium 8.5 (L) 8.7 - 10.5 mg/dL    Total Protein 7.1 6.0 - 8.4 g/dL    Albumin 2.6 (L) 3.5 - 5.2 g/dL    Total Bilirubin 0.4 0.1 - 1.0 mg/dL    Alkaline Phosphatase 69 55 - 135 U/L     (H) 10 - 40 U/L    ALT 46 (H) 10 - 44 U/L    Anion Gap 5 (L) 8 - 16 mmol/L    eGFR if African American >60.0 >60 mL/min/1.73 m^2    eGFR if non African American >60.0 >60 mL/min/1.73 m^2   Magnesium    Collection Time: 02/01/19  4:12 AM   Result Value Ref Range    Magnesium 2.1 1.6 - 2.6 mg/dL   Phosphorus    Collection Time: 02/01/19  4:12 AM   Result Value Ref Range    Phosphorus 1.8 (L) 2.7 - 4.5 mg/dL   CK    Collection Time: 02/01/19  4:12 AM   Result  Value Ref Range     (H) 20 - 180 U/L   CBC auto differential    Collection Time: 02/01/19  4:12 AM   Result Value Ref Range    WBC 8.24 3.90 - 12.70 K/uL    RBC 3.45 (L) 4.00 - 5.40 M/uL    Hemoglobin 9.2 (L) 12.0 - 16.0 g/dL    Hematocrit 29.6 (L) 37.0 - 48.5 %    MCV 86 82 - 98 fL    MCH 26.7 (L) 27.0 - 31.0 pg    MCHC 31.1 (L) 32.0 - 36.0 g/dL    RDW 18.2 (H) 11.5 - 14.5 %    Platelets 139 (L) 150 - 350 K/uL    MPV 11.0 9.2 - 12.9 fL    Immature Granulocytes 0.8 (H) 0.0 - 0.5 %    Gran # (ANC) 7.3 1.8 - 7.7 K/uL    Immature Grans (Abs) 0.07 (H) 0.00 - 0.04 K/uL    Lymph # 0.4 (L) 1.0 - 4.8 K/uL    Mono # 0.5 0.3 - 1.0 K/uL    Eos # 0.0 0.0 - 0.5 K/uL    Baso # 0.00 0.00 - 0.20 K/uL    nRBC 0 0 /100 WBC    Gran% 88.4 (H) 38.0 - 73.0 %    Lymph% 4.7 (L) 18.0 - 48.0 %    Mono% 6.1 4.0 - 15.0 %    Eosinophil% 0.0 0.0 - 8.0 %    Basophil% 0.0 0.0 - 1.9 %    Differential Method Automated      Results for ROMEO PEREZ (MRN [id number]) as of 2/1/2019 10:59   Ref. Range 1/28/2019 04:00 1/29/2019 05:00 1/30/2019 05:45 1/31/2019 04:00 2/1/2019 04:12   CPK Latest Ref Range: 20 - 180 U/L 2249 (H) 1789 (H) 1424 (H) 1147 (H) 827 (H)     Results for ROMEO PEREZ (MRN [id number]) as of 1/28/2019 10:23   Ref. Range 1/24/2019 03:40 1/25/2019 04:20 1/26/2019 04:00 1/27/2019 04:21 1/28/2019 04:00   Aldolase Latest Ref Range: 1.2 - 7.6 U/L 12.7 (H) 14.2 (H) 13.7 (H) 13.1 (H) 15.4 (H)   Results for ROMEO PEREZ (MRN [id number]) as of 1/28/2019 10:23   Ref. Range 1/22/2019 22:24   Sed Rate Latest Ref Range: 0 - 36 mm/Hr 50 (H)     Results for ROMEO PEREZ (MRN [id number]) as of 1/28/2019 10:23   Ref. Range 1/22/2019 22:24   CRP Latest Ref Range: 0.0 - 8.2 mg/L 31.1 (H)      Results for ROMEO PEREZ (MRN [id number]) as of 1/28/2019 10:23   Ref. Range 1/22/2019 22:24 1/25/2019 17:33   DARCY HEP-2 Titer Unknown Positive 1:640 Sp...    Anti-SSA Antibody Latest Ref Range: 0.00 - 19.99 EU 0.49    Anti-SSA Interpretation  Latest Ref Range: Negative  Negative    Anti-SSB Antibody Latest Ref Range: 0.00 - 19.99 EU 0.11    Anti-SSB Interpretation Latest Ref Range: Negative  Negative    ds DNA Ab Latest Ref Range: Negative 1:10  Negative 1:10    Anti Sm Antibody Latest Ref Range: 0.00 - 19.99 EU 0.58    Anti-Sm Interpretation Latest Ref Range: Negative  Negative    Anti Sm/RNP Antibody Latest Ref Range: 0.00 - 19.99 EU 0.62    Anti-Sm/RNP Interpretation Latest Ref Range: Negative  Negative    DARCY Screen Latest Ref Range: Negative <1:160  Positive (A)    Complement (C-3) Latest Ref Range: 50 - 180 mg/dL  106   Complement (C-4) Latest Ref Range: 11 - 44 mg/dL  29   Meg-1 Autoantibodies Latest Ref Range: <1.0 Index <1.00    Results for ROMEO PEREZ (MRN [id number]) as of 1/28/2019 10:23   Ref. Range 1/23/2019 02:55 1/23/2019 02:56   Specimen UA Unknown Urine, Clean Catch    Color, UA Latest Ref Range: Yellow, Straw, [place]  Colorless (A)    pH, UA Latest Ref Range: 5.0 - 8.0  6.0    Specific Gravity, UA Latest Ref Range: 1.005 - 1.030  1.005    Appearance, UA Latest Ref Range: Clear  Clear    Protein, UA Latest Ref Range: Negative  Negative    Glucose, UA Latest Ref Range: Negative  Negative    Ketones, UA Latest Ref Range: Negative  Negative    Occult Blood UA Latest Ref Range: Negative  Negative    Nitrite, UA Latest Ref Range: Negative  Negative    Bilirubin (UA) Latest Ref Range: Negative  Negative    Leukocytes, UA Latest Ref Range: Negative  Trace (A)    RBC, UA Latest Ref Range: 0 - 4 /hpf  2   WBC, UA Latest Ref Range: 0 - 5 /hpf  3   Squam Epithel, UA Latest Units: /hpf  0   Microscopic Comment Unknown  SEE COMMENT     Meg-1: Negative    Significant Imaging:  MRI Humerus w/ and w/o contrast:  Impression       1. Diffuse edema and enhancement of the visualized left upper extremity musculature, most pronounced proximally, most notably of the deltoid and biceps musculature as detailed above.  Findings are nonspecific although could  relate to a nonspecific myelitis particularly in light of patient's reported history.  Clinical correlation with appropriate history and lab markers advised.  2. No evidence of abscess or osteomyelitis.  This report was flagged in Epic as abnormal.     NM PET CT 12/27/18:  Impression       See above    Significant improvement in all the previously seen lymph nodes involving the left lower neck, supraclavicular region, axillary and retropectoral region.    Index left retropectoral SUV max 3.57, previously 27.2.     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 Biopsied.                        - Small hiatal hernia.                        - Normal stomach.                        - Normal examined duodenum.  Recommendation:       - Return patient to hospital to for ongoing care.                        - Await pathology results.                        - Use a proton pump inhibitor PO BID.                        - The findings and recommendations were discussed                         with the designated responsible adult.                        - Repeat upper endoscopy in 8 weeks to check                         healing.           Assessment/Plan:     * Myositis    59yo F with history of L sided infiltrating ductal carcinoma of the L breast (dx on Jan 2017), HTN, depression, and gastritis was send from hem/onc clinic for evaluation of possible inflammatory myositis.     Patient started on Atelizumab/Abraxane on 11/7/18. Per chart review, patient noticed rashes on the dorsum of her hands on 11/11/18. Seen in urgent care and given topical steroid cream. Then received two more infusions on Atelizumab/Abraxane on 11/21/18 and 12/5/18. Started to notice puffiness around the eyes on 12/11/18. Received another Abraxane infusion on 12/12/18 but this time with hydrocortisone 50 mg which helped reduce the swelling around  "the eyes. Developed swelling of the face again on 12/15/18. Next infusion of Abraxane done on 12/19/18 with Solucortef and Atelizumab held. Abraxane given again on 1/3/19 with no IV steroid. Patient developed swelling of the face on 1/4/19 and went to urgent care and given short course of prednisone 20 mg BID. On 1/15/19, patient seen in hem/onc clinic with c/o of pressure and tightness around neck. CT scan of chest and neck did not reveal any vascular compression. Started on prednisone 60 mg with taper. On 1/22/18 patient with c/o proximal muscle weakness. CPK found to be elevated around 4k. Patient admitted and given solumedrol 80 mg IV on 1/22/18. Last infusion of Atelizumab on 12/19/18. Last infusion of Abraxane on 1/3/19. Given Solumedrol 1g x 1 on 1/23/18. Seen by Dermatology on 1/24/18 and biopsy done of skin rash. Given distribution of rashes, there was concern for dermatomyositis. Patient started on Solumedrol 125 mg IV BID at this time.      Labs: ESR 50, CRP 31.1, CPK 4562 (trending down), Aldolase 13.6.  , ALT 64.  UA normal.  CBC unremarkable.  +DARCY 1:640 speckled with negative profile.  Complements normal. Normal GGT. RPR, HIV negative. Hep B/C negative.  Strongyloides negative.    Spirometry: normal, normal diffusion capacity. FVC: 81%, DLCO: 114%    Case reports of docetaxel related inflammatory myositis had been documented. "...taxanes have been noted to cause disabling but transient arthralgia and myalgias; it is important to consider the possibility of inflammatory myopathy as a possible complication in patients undergoing treatment with these agents." - A case of docetaxel induced myositis and review of literature. Austin et al 2015.    Case reports in Rheumatology   Case reports of atezolizumb (immunomodulator) cause of myositis  - Lucy et al 2017 "Myositis as an adverse even of immune checkpoint blockade for cancer therapy.  Seminars in Arthritis and Rheumatism     Patient exhibits " signs of dermatomyositis (heliotropic rash and gottron's papules).   Dermatomyositis can be associated with malignancy.  MRI of LUE showed edema of the deltoid and biceps.  Exam with proximal muscle weakness: b/l deltoids 4/5, b/l iliopsoas 4.4/5. 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      Inpatient treatments so far:  - Solumedrol 80 mg IV x 1 on 1/22/19  - Solumedrol 1 g IV x 1 on 1/23/19.  - Solumedrol 125 mg IV BID since 1/24/19 -->being tapered down.     Plan:  - chemotherapy can be re-started as it may help treat Dermatomyositis. Would recommend against checkpoint inhibitor, however, as it may have been a trigger for the dermatomyositis  - continue to monitor CPK, aldolase, AST/ALT  - Decrease Solumderol to 30mg IV q12h.  - continue IVIg 400 mg/kg daily x 5 days    - MTX 15 mg sc weekly (Tuesdays) with daily folic acid.   - recommend ID fast track consult for vaccinations.  - f/u Quant TB  - f/u esophageal biopsy results  - start physical therapy.   - f/u myomarker panel and HMGCR   - f/u with Dr. Swift and Dr. Campos in Rheumatology on 2/15/19 at 2PM           Wilner Reynolds MD  Rheumatology  [facility]      I  Have personally take the history and examined the patient and agree with fellow's note as stated above. Pt is unchanged compared with yesterday in terms of motor strength with deltoids 4/5. Triceps 4.4/5 biceps 4.6/5 psoas 4.8/5 quad 4.8/5  CK  827    Day 4/5 IVIg 400mg/kg today  Decrease Solu-Medrol 30mg q 12h  Next methotrexate 15mg sc 2/5/19  Await Myomarker 3 HMGCR  Await esophageal biopsy  F/u Dr. Swift and Andres 2/15/19

## 2019-02-01 NOTE — PT/OT/SLP PROGRESS
"Physical Therapy Treatment    Patient Name:  Ermelinda Verde   MRN:  [id number]    Recommendations:     Discharge Recommendations:  (HHPT)   Discharge Equipment Recommendations: walker, rolling   Barriers to discharge: None    Assessment:     Ermelinda Verde is a 58 y.o. female admitted with a medical diagnosis of Myositis.  She presents with the following impairments/functional limitations:  weakness, impaired endurance, impaired functional mobilty, gait instability, impaired balance, decreased safety awareness, decreased lower extremity function.  Pt cont to decline in ability to amb further distances.  Able to amb without any need for CGA-Sandra, with verbal cuing only to correct poor gait mechanics.  Demonstrates fatigue and truncal sway after approx 5 min of amb training.  HEP progressed to standing ex. Discussed rehab expectations.     Rehab Prognosis: Fair; patient would benefit from acute skilled PT services to address these deficits and reach maximum level of function.    Recent Surgery: Procedure(s) (LRB):  EGD (ESOPHAGOGASTRODUODENOSCOPY) (N/A) 3 Days Post-Op    Plan:     During this hospitalization, patient to be seen 3 x/week to address the identified rehab impairments via gait training, therapeutic activities, therapeutic exercises, neuromuscular re-education and progress toward the following goals:    · Plan of Care Expires:  02/22/19    Subjective     Chief Complaint: Fatigue  Patient/Family Comments/goals: To eat  Pain/Comfort:  · Pain Rating 1: 0/10      Objective:     Communicated with nursing prior to session.  Patient found all lines intact and call button in reach NG tube, telemetry  upon PT entry to room.     "They said I'm going to rehab."    General Precautions: Standard, aspiration, fall, NPO   Orthopedic Precautions:N/A   Braces: N/A     Functional Mobility:  · Transfers:     · Sit to Stand:  independence with no AD  · Bed to Chair: stand by assistance with  rolling walker  using  Stand " Pivot  · Gait: Pt amb 440', SBA, RW, with hyperflexion in B knees through midstance with verbal cuing to correct, truncal sway noted  · Balance: SBA: dynamic standing balance with AD      AM-PAC 6 CLICK MOBILITY  Turning over in bed (including adjusting bedclothes, sheets and blankets)?: 4  Sitting down on and standing up from a chair with arms (e.g., wheelchair, bedside commode, etc.): 4  Moving from lying on back to sitting on the side of the bed?: 4  Moving to and from a bed to a chair (including a wheelchair)?: 3  Need to walk in hospital room?: 3  Climbing 3-5 steps with a railing?: 3  Basic Mobility Total Score: 21       Therapeutic Activities and Exercises:   Whiteboard updated   6MWT: Pt amb 384' = 117.04m, SBA, RW, 5/10 RPE following  Heel raises: 20 reps, in stance with RW for support  Marchin reps, in stance, alt LEs, with RW for support  Squats: 10 reps x 2 sets, with RW for support, demo and verbal/tactile cuing for proper tech  Hip abd: 10 reps, each LE for individually, in stance, with RW for support    Patient left up in chair with all lines intact, call button in reach and spouse present.    GOALS:   Multidisciplinary Problems     Physical Therapy Goals        Problem: Physical Therapy Goal    Goal Priority Disciplines Outcome Goal Variances Interventions   Physical Therapy Goal     PT, PT/OT Ongoing (interventions implemented as appropriate)     Description:  Goals to be met by: 19     Patient will increase functional independence with mobility by performin. Gait  x 500 feet with Supervision without device. - GOAL MET    2. Increased functional strength to 5/5 for BLE.  3. Standing lower extremity exercise program x 15 reps per handout, with supervision.  4. Pt to perf 6MWT: amb 580', to demonstrate a clinically significant improvement in aerobic capacity.                      Time Tracking:     PT Received On: 19  PT Start Time: 1242     PT Stop Time: 1310  PT Total Time  (min): 28 min     Billable Minutes: Gait Training 9 and Therapeutic Exercise 17    Treatment Type: Treatment  PT/PTA: PT           Lotus Camacho, PT  02/01/2019

## 2019-02-01 NOTE — SUBJECTIVE & OBJECTIVE
Interval History: Patient seen at bedside. Strength improving as well as swallowing. Pending repeat swallow eval.  On day 4 of IVIG. Pending esophageal biopsy. Remains NPO. No new rashes. No trouble breathing, f/c, n/v/d. Started on vaccinations.     Current Facility-Administered Medications   Medication Frequency    acetaminophen tablet 650 mg Q4H PRN    acetaminophen tablet 650 mg Q24H    ALPRAZolam tablet 0.25 mg TID PRN    amLODIPine tablet 10 mg Daily    dextrose 50% injection 12.5 g PRN    dextrose 50% injection 25 g PRN    diphenhydrAMINE (BENADRYL) 50 mg in sodium chloride 0.9% 50 mL IVPB Q24H    enoxaparin injection 40 mg Daily    escitalopram oxalate tablet 10 mg Daily    famotidine (PF) injection 20 mg PRN    famotidine (PF) injection 20 mg Q24H    fluconazole tablet 200 mg Q24H    folic acid tablet 1 mg Daily    glucagon (human recombinant) injection 1 mg PRN    glucose chewable tablet 16 g PRN    glucose chewable tablet 24 g PRN    guaifenesin 100 mg/5 ml syrup 200 mg Q4H    hepatitis B (HEPLISAV-B) 20 mcg/0.5 mL vaccine 0.5 mL vaccine x 1 dose    hydrALAZINE injection 10 mg Q6H PRN    Immune Globulin G (IGG)-PRO-IGA 10 % injection (Privigen) 10 % injection 30 g Q24H    insulin aspart U-100 pen 0-5 Units QID (AC + HS) PRN    methylPREDNISolone sodium succinate injection 40 mg Q12H    ondansetron disintegrating tablet 8 mg Q8H PRN    oxyCODONE immediate release tablet 5 mg Q6H PRN    pneumoc 13-brandin conj-dip cr(PF) (PREVNAR 13 (PF)) 0.5 mL vaccine x 1 dose    polyethylene glycol packet 17 g BID PRN    potassium, sodium phosphates 280-160-250 mg packet 2 packet Q4H    promethazine tablet 12.5 mg Q6H PRN    sodium chloride 0.9% flush 10 mL PRN    sodium chloride 0.9% flush 5 mL PRN     Objective:     Vital Signs (Most Recent):  Temp: 98.6 °F (37 °C) (02/01/19 0758)  Pulse: 97 (02/01/19 0758)  Resp: 16 (02/01/19 0758)  BP: 131/67 (02/01/19 0758)  SpO2: 98 % (02/01/19 0758)  O2  Device (Oxygen Therapy): room air (02/01/19 0758) Vital Signs (24h Range):  Temp:  [98.1 °F (36.7 °C)-98.9 °F (37.2 °C)] 98.6 °F (37 °C)  Pulse:  [84-97] 97  Resp:  [14-18] 16  SpO2:  [96 %-100 %] 98 %  BP: (108-142)/(62-73) 131/67     Weight: 76.2 kg (168 lb 1.6 oz) (02/01/19 0400)  Body mass index is 27.97 kg/m².  Body surface area is 1.87 meters squared.      Intake/Output Summary (Last 24 hours) at 2/1/2019 1058  Last data filed at 2/1/2019 1000  Gross per 24 hour   Intake 1661 ml   Output 1400 ml   Net 261 ml       Physical Exam   Constitutional: She is oriented to person, place, and time and well-developed, well-nourished, and in no distress. No distress.   HENT:   Head: Normocephalic and atraumatic.   Right Ear: External ear normal.   Left Ear: External ear normal.   Bilateral orbital edema with heliotropic rash - resolved   Eyes: Conjunctivae and EOM are normal. Pupils are equal, round, and reactive to light.   Neck: Normal range of motion. Neck supple.   Cardiovascular: Normal rate, regular rhythm, normal heart sounds and intact distal pulses.    Pulmonary/Chest: Effort normal and breath sounds normal. No respiratory distress.   Abdominal: Soft. Bowel sounds are normal.       Right Side Rheumatological Exam     Muscle Strength (0-5 scale):  Neck Flexion:  4.8  Neck Extension: 5  Deltoid:  4.2  Biceps: 5/5   Triceps:  4.6  : 5/5   Iliopsoas: 4.6  Quadriceps:  5   Distal Lower Extremity: 5    Left Side Rheumatological Exam     Muscle Strength (0-5 scale):  Neck Flexion:  4.8  Neck Extension: 5  Deltoid:  4.2  Biceps: 5/5   Triceps:  4.6  :  5/5   Iliopsoas: 4.6  Quadriceps:  5   Distal Lower Extremity: 5      Neurological: She is alert and oriented to person, place, and time. GCS score is 15.   Skin: Skin is warm and dry. No rash noted.     Hyperpigmented non raised rash over nape of neck, elbows, knuckles (resemble Gottron's papules), thighs, and ankles.        Psychiatric: Mood, memory, affect and  judgment normal.   Musculoskeletal: Normal range of motion. She exhibits edema. She exhibits no tenderness or deformity.   No signs of synovitis. ROM intact   Inability to move bilateral UE above 45 degrees simultaneously.  Able to move a little past 120 on each one alone           Significant Labs:  Recent Results (from the past 48 hour(s))   Comprehensive Metabolic Panel (CMP)    Collection Time: 01/31/19  4:00 AM   Result Value Ref Range    Sodium 135 (L) 136 - 145 mmol/L    Potassium 3.7 3.5 - 5.1 mmol/L    Chloride 101 95 - 110 mmol/L    CO2 28 23 - 29 mmol/L    Glucose 157 (H) 70 - 110 mg/dL    BUN, Bld 24 (H) 6 - 20 mg/dL    Creatinine 0.6 0.5 - 1.4 mg/dL    Calcium 8.5 (L) 8.7 - 10.5 mg/dL    Total Protein 6.9 6.0 - 8.4 g/dL    Albumin 2.7 (L) 3.5 - 5.2 g/dL    Total Bilirubin 0.5 0.1 - 1.0 mg/dL    Alkaline Phosphatase 66 55 - 135 U/L     (H) 10 - 40 U/L    ALT 52 (H) 10 - 44 U/L    Anion Gap 6 (L) 8 - 16 mmol/L    eGFR if African American >60.0 >60 mL/min/1.73 m^2    eGFR if non African American >60.0 >60 mL/min/1.73 m^2   Magnesium    Collection Time: 01/31/19  4:00 AM   Result Value Ref Range    Magnesium 2.3 1.6 - 2.6 mg/dL   Phosphorus    Collection Time: 01/31/19  4:00 AM   Result Value Ref Range    Phosphorus 2.1 (L) 2.7 - 4.5 mg/dL   CK    Collection Time: 01/31/19  4:00 AM   Result Value Ref Range    CPK 1147 (H) 20 - 180 U/L   CBC auto differential    Collection Time: 01/31/19  4:00 AM   Result Value Ref Range    WBC 10.12 3.90 - 12.70 K/uL    RBC 3.52 (L) 4.00 - 5.40 M/uL    Hemoglobin 9.6 (L) 12.0 - 16.0 g/dL    Hematocrit 30.6 (L) 37.0 - 48.5 %    MCV 87 82 - 98 fL    MCH 27.3 27.0 - 31.0 pg    MCHC 31.4 (L) 32.0 - 36.0 g/dL    RDW 18.0 (H) 11.5 - 14.5 %    Platelets 151 150 - 350 K/uL    MPV 9.7 9.2 - 12.9 fL    Immature Granulocytes 0.8 (H) 0.0 - 0.5 %    Gran # (ANC) 9.0 (H) 1.8 - 7.7 K/uL    Immature Grans (Abs) 0.08 (H) 0.00 - 0.04 K/uL    Lymph # 0.4 (L) 1.0 - 4.8 K/uL    Mono #  0.6 0.3 - 1.0 K/uL    Eos # 0.0 0.0 - 0.5 K/uL    Baso # 0.01 0.00 - 0.20 K/uL    nRBC 0 0 /100 WBC    Gran% 89.3 (H) 38.0 - 73.0 %    Lymph% 4.2 (L) 18.0 - 48.0 %    Mono% 5.6 4.0 - 15.0 %    Eosinophil% 0.0 0.0 - 8.0 %    Basophil% 0.1 0.0 - 1.9 %    Differential Method Automated    Aldolase    Collection Time: 01/31/19  4:00 AM   Result Value Ref Range    Aldolase 3.1 1.2 - 7.6 U/L   Comprehensive Metabolic Panel (CMP)    Collection Time: 02/01/19  4:12 AM   Result Value Ref Range    Sodium 135 (L) 136 - 145 mmol/L    Potassium 4.0 3.5 - 5.1 mmol/L    Chloride 101 95 - 110 mmol/L    CO2 29 23 - 29 mmol/L    Glucose 163 (H) 70 - 110 mg/dL    BUN, Bld 25 (H) 6 - 20 mg/dL    Creatinine 0.6 0.5 - 1.4 mg/dL    Calcium 8.5 (L) 8.7 - 10.5 mg/dL    Total Protein 7.1 6.0 - 8.4 g/dL    Albumin 2.6 (L) 3.5 - 5.2 g/dL    Total Bilirubin 0.4 0.1 - 1.0 mg/dL    Alkaline Phosphatase 69 55 - 135 U/L     (H) 10 - 40 U/L    ALT 46 (H) 10 - 44 U/L    Anion Gap 5 (L) 8 - 16 mmol/L    eGFR if African American >60.0 >60 mL/min/1.73 m^2    eGFR if non African American >60.0 >60 mL/min/1.73 m^2   Magnesium    Collection Time: 02/01/19  4:12 AM   Result Value Ref Range    Magnesium 2.1 1.6 - 2.6 mg/dL   Phosphorus    Collection Time: 02/01/19  4:12 AM   Result Value Ref Range    Phosphorus 1.8 (L) 2.7 - 4.5 mg/dL   CK    Collection Time: 02/01/19  4:12 AM   Result Value Ref Range     (H) 20 - 180 U/L   CBC auto differential    Collection Time: 02/01/19  4:12 AM   Result Value Ref Range    WBC 8.24 3.90 - 12.70 K/uL    RBC 3.45 (L) 4.00 - 5.40 M/uL    Hemoglobin 9.2 (L) 12.0 - 16.0 g/dL    Hematocrit 29.6 (L) 37.0 - 48.5 %    MCV 86 82 - 98 fL    MCH 26.7 (L) 27.0 - 31.0 pg    MCHC 31.1 (L) 32.0 - 36.0 g/dL    RDW 18.2 (H) 11.5 - 14.5 %    Platelets 139 (L) 150 - 350 K/uL    MPV 11.0 9.2 - 12.9 fL    Immature Granulocytes 0.8 (H) 0.0 - 0.5 %    Gran # (ANC) 7.3 1.8 - 7.7 K/uL    Immature Grans (Abs) 0.07 (H) 0.00 - 0.04 K/uL     Lymph # 0.4 (L) 1.0 - 4.8 K/uL    Mono # 0.5 0.3 - 1.0 K/uL    Eos # 0.0 0.0 - 0.5 K/uL    Baso # 0.00 0.00 - 0.20 K/uL    nRBC 0 0 /100 WBC    Gran% 88.4 (H) 38.0 - 73.0 %    Lymph% 4.7 (L) 18.0 - 48.0 %    Mono% 6.1 4.0 - 15.0 %    Eosinophil% 0.0 0.0 - 8.0 %    Basophil% 0.0 0.0 - 1.9 %    Differential Method Automated      Results for ROMEO PEREZ (MRN [id number]) as of 2/1/2019 10:59   Ref. Range 1/28/2019 04:00 1/29/2019 05:00 1/30/2019 05:45 1/31/2019 04:00 2/1/2019 04:12   CPK Latest Ref Range: 20 - 180 U/L 2249 (H) 1789 (H) 1424 (H) 1147 (H) 827 (H)     Results for ROMEO PEREZ (MRN [id number]) as of 1/28/2019 10:23   Ref. Range 1/24/2019 03:40 1/25/2019 04:20 1/26/2019 04:00 1/27/2019 04:21 1/28/2019 04:00   Aldolase Latest Ref Range: 1.2 - 7.6 U/L 12.7 (H) 14.2 (H) 13.7 (H) 13.1 (H) 15.4 (H)   Results for ROMEO PEREZ (MRN [id number]) as of 1/28/2019 10:23   Ref. Range 1/22/2019 22:24   Sed Rate Latest Ref Range: 0 - 36 mm/Hr 50 (H)     Results for ROMEO PEREZ (MRN [id number]) as of 1/28/2019 10:23   Ref. Range 1/22/2019 22:24   CRP Latest Ref Range: 0.0 - 8.2 mg/L 31.1 (H)      Results for ROMEO PEREZ (MRN [id number]) as of 1/28/2019 10:23   Ref. Range 1/22/2019 22:24 1/25/2019 17:33   DARCY HEP-2 Titer Unknown Positive 1:640 Sp...    Anti-SSA Antibody Latest Ref Range: 0.00 - 19.99 EU 0.49    Anti-SSA Interpretation Latest Ref Range: Negative  Negative    Anti-SSB Antibody Latest Ref Range: 0.00 - 19.99 EU 0.11    Anti-SSB Interpretation Latest Ref Range: Negative  Negative    ds DNA Ab Latest Ref Range: Negative 1:10  Negative 1:10    Anti Sm Antibody Latest Ref Range: 0.00 - 19.99 EU 0.58    Anti-Sm Interpretation Latest Ref Range: Negative  Negative    Anti Sm/RNP Antibody Latest Ref Range: 0.00 - 19.99 EU 0.62    Anti-Sm/RNP Interpretation Latest Ref Range: Negative  Negative    DARCY Screen Latest Ref Range: Negative <1:160  Positive (A)    Complement (C-3) Latest Ref Range: 50 -  180 mg/dL  106   Complement (C-4) Latest Ref Range: 11 - 44 mg/dL  29   Meg-1 Autoantibodies Latest Ref Range: <1.0 Index <1.00    Results for ROMEO PEREZ (MRN [id number]) as of 1/28/2019 10:23   Ref. Range 1/23/2019 02:55 1/23/2019 02:56   Specimen UA Unknown Urine, Clean Catch    Color, UA Latest Ref Range: Yellow, Straw, [place]  Colorless (A)    pH, UA Latest Ref Range: 5.0 - 8.0  6.0    Specific Gravity, UA Latest Ref Range: 1.005 - 1.030  1.005    Appearance, UA Latest Ref Range: Clear  Clear    Protein, UA Latest Ref Range: Negative  Negative    Glucose, UA Latest Ref Range: Negative  Negative    Ketones, UA Latest Ref Range: Negative  Negative    Occult Blood UA Latest Ref Range: Negative  Negative    Nitrite, UA Latest Ref Range: Negative  Negative    Bilirubin (UA) Latest Ref Range: Negative  Negative    Leukocytes, UA Latest Ref Range: Negative  Trace (A)    RBC, UA Latest Ref Range: 0 - 4 /hpf  2   WBC, UA Latest Ref Range: 0 - 5 /hpf  3   Squam Epithel, UA Latest Units: /hpf  0   Microscopic Comment Unknown  SEE COMMENT     Meg-1: Negative    Significant Imaging:  MRI Humerus w/ and w/o contrast:  Impression       1. Diffuse edema and enhancement of the visualized left upper extremity musculature, most pronounced proximally, most notably of the deltoid and biceps musculature as detailed above.  Findings are nonspecific although could relate to a nonspecific myelitis particularly in light of patient's reported history.  Clinical correlation with appropriate history and lab markers advised.  2. No evidence of abscess or osteomyelitis.  This report was flagged in Epic as abnormal.     NM PET CT 12/27/18:  Impression       See above    Significant improvement in all the previously seen lymph nodes involving the left lower neck, supraclavicular region, axillary and retropectoral region.    Index left retropectoral SUV max 3.57, previously 27.2.     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 Biopsied.                        - Small hiatal hernia.                        - Normal stomach.                        - Normal examined duodenum.  Recommendation:       - Return patient to hospital to for ongoing care.                        - Await pathology results.                        - Use a proton pump inhibitor PO BID.                        - The findings and recommendations were discussed                         with the designated responsible adult.                        - Repeat upper endoscopy in 8 weeks to check                         healing.

## 2019-02-02 LAB
ALBUMIN SERPL BCP-MCNC: 2.5 G/DL
ALP SERPL-CCNC: 74 U/L
ALT SERPL W/O P-5'-P-CCNC: 40 U/L
ANION GAP SERPL CALC-SCNC: 4 MMOL/L
AST SERPL-CCNC: 93 U/L
BASOPHILS # BLD AUTO: 0 K/UL
BASOPHILS NFR BLD: 0 %
BILIRUB SERPL-MCNC: 0.3 MG/DL
BUN SERPL-MCNC: 25 MG/DL
CALCIUM SERPL-MCNC: 8.5 MG/DL
CHLORIDE SERPL-SCNC: 101 MMOL/L
CK SERPL-CCNC: 662 U/L
CO2 SERPL-SCNC: 30 MMOL/L
CREAT SERPL-MCNC: 0.6 MG/DL
DIFFERENTIAL METHOD: ABNORMAL
EOSINOPHIL # BLD AUTO: 0 K/UL
EOSINOPHIL NFR BLD: 0 %
ERYTHROCYTE [DISTWIDTH] IN BLOOD BY AUTOMATED COUNT: 18.5 %
EST. GFR  (AFRICAN AMERICAN): >60 ML/MIN/1.73 M^2
EST. GFR  (NON AFRICAN AMERICAN): >60 ML/MIN/1.73 M^2
GLUCOSE SERPL-MCNC: 151 MG/DL
HCT VFR BLD AUTO: 28.3 %
HGB BLD-MCNC: 8.8 G/DL
IMM GRANULOCYTES # BLD AUTO: 0.08 K/UL
IMM GRANULOCYTES NFR BLD AUTO: 0.8 %
LYMPHOCYTES # BLD AUTO: 0.6 K/UL
LYMPHOCYTES NFR BLD: 5.9 %
MAGNESIUM SERPL-MCNC: 2.1 MG/DL
MCH RBC QN AUTO: 27.4 PG
MCHC RBC AUTO-ENTMCNC: 31.1 G/DL
MCV RBC AUTO: 88 FL
MONOCYTES # BLD AUTO: 0.7 K/UL
MONOCYTES NFR BLD: 6.7 %
NEUTROPHILS # BLD AUTO: 9.1 K/UL
NEUTROPHILS NFR BLD: 86.6 %
NRBC BLD-RTO: 0 /100 WBC
PHOSPHATE SERPL-MCNC: 2.3 MG/DL
PLATELET # BLD AUTO: 126 K/UL
PMV BLD AUTO: 9.9 FL
POTASSIUM SERPL-SCNC: 4.4 MMOL/L
PROT SERPL-MCNC: 7.2 G/DL
RBC # BLD AUTO: 3.21 M/UL
SODIUM SERPL-SCNC: 135 MMOL/L
WBC # BLD AUTO: 10.46 K/UL

## 2019-02-02 PROCEDURE — 63600175 PHARM REV CODE 636 W HCPCS: Performed by: INTERNAL MEDICINE

## 2019-02-02 PROCEDURE — 80053 COMPREHEN METABOLIC PANEL: CPT

## 2019-02-02 PROCEDURE — 63600175 PHARM REV CODE 636 W HCPCS: Mod: JG | Performed by: INTERNAL MEDICINE

## 2019-02-02 PROCEDURE — 25000003 PHARM REV CODE 250: Performed by: STUDENT IN AN ORGANIZED HEALTH CARE EDUCATION/TRAINING PROGRAM

## 2019-02-02 PROCEDURE — 84100 ASSAY OF PHOSPHORUS: CPT

## 2019-02-02 PROCEDURE — 82550 ASSAY OF CK (CPK): CPT

## 2019-02-02 PROCEDURE — S0028 INJECTION, FAMOTIDINE, 20 MG: HCPCS | Performed by: STUDENT IN AN ORGANIZED HEALTH CARE EDUCATION/TRAINING PROGRAM

## 2019-02-02 PROCEDURE — 99233 PR SUBSEQUENT HOSPITAL CARE,LEVL III: ICD-10-PCS | Mod: ,,, | Performed by: INTERNAL MEDICINE

## 2019-02-02 PROCEDURE — 99233 SBSQ HOSP IP/OBS HIGH 50: CPT | Mod: ,,, | Performed by: INTERNAL MEDICINE

## 2019-02-02 PROCEDURE — 99231 PR SUBSEQUENT HOSPITAL CARE,LEVL I: ICD-10-PCS | Mod: ,,, | Performed by: INTERNAL MEDICINE

## 2019-02-02 PROCEDURE — 20600001 HC STEP DOWN PRIVATE ROOM

## 2019-02-02 PROCEDURE — 85025 COMPLETE CBC W/AUTO DIFF WBC: CPT

## 2019-02-02 PROCEDURE — 82085 ASSAY OF ALDOLASE: CPT

## 2019-02-02 PROCEDURE — 99231 SBSQ HOSP IP/OBS SF/LOW 25: CPT | Mod: ,,, | Performed by: INTERNAL MEDICINE

## 2019-02-02 PROCEDURE — 83735 ASSAY OF MAGNESIUM: CPT

## 2019-02-02 RX ORDER — PANTOPRAZOLE SODIUM 40 MG/10ML
40 INJECTION, POWDER, LYOPHILIZED, FOR SOLUTION INTRAVENOUS DAILY
Status: DISCONTINUED | OUTPATIENT
Start: 2019-02-03 | End: 2019-02-04

## 2019-02-02 RX ORDER — DIPHENHYDRAMINE HYDROCHLORIDE 50 MG/ML
50 INJECTION INTRAMUSCULAR; INTRAVENOUS
Status: COMPLETED | OUTPATIENT
Start: 2019-02-02 | End: 2019-02-03

## 2019-02-02 RX ORDER — METHYLPREDNISOLONE SOD SUCC 125 MG
24 VIAL (EA) INJECTION EVERY 12 HOURS
Status: DISCONTINUED | OUTPATIENT
Start: 2019-02-02 | End: 2019-02-09

## 2019-02-02 RX ADMIN — GUAIFENESIN 200 MG: 200 SOLUTION ORAL at 05:02

## 2019-02-02 RX ADMIN — ESCITALOPRAM OXALATE 10 MG: 5 TABLET, FILM COATED ORAL at 08:02

## 2019-02-02 RX ADMIN — METHYLPREDNISOLONE SODIUM SUCCINATE 24 MG: 125 INJECTION, POWDER, FOR SOLUTION INTRAMUSCULAR; INTRAVENOUS at 09:02

## 2019-02-02 RX ADMIN — GUAIFENESIN 200 MG: 200 SOLUTION ORAL at 02:02

## 2019-02-02 RX ADMIN — FAMOTIDINE 20 MG: 10 INJECTION, SOLUTION INTRAVENOUS at 03:02

## 2019-02-02 RX ADMIN — AMLODIPINE BESYLATE 10 MG: 10 TABLET ORAL at 08:02

## 2019-02-02 RX ADMIN — GUAIFENESIN 200 MG: 200 SOLUTION ORAL at 09:02

## 2019-02-02 RX ADMIN — FOLIC ACID 1 MG: 1 TABLET ORAL at 08:02

## 2019-02-02 RX ADMIN — METHYLPREDNISOLONE SODIUM SUCCINATE 30 MG: 125 INJECTION, POWDER, FOR SOLUTION INTRAMUSCULAR; INTRAVENOUS at 08:02

## 2019-02-02 RX ADMIN — HUMAN IMMUNOGLOBULIN G 30 G: 20 LIQUID INTRAVENOUS at 04:02

## 2019-02-02 RX ADMIN — ACETAMINOPHEN 650 MG: 325 TABLET, FILM COATED ORAL at 03:02

## 2019-02-02 RX ADMIN — DIPHENHYDRAMINE HYDROCHLORIDE 50 MG: 50 INJECTION, SOLUTION INTRAMUSCULAR; INTRAVENOUS at 04:02

## 2019-02-02 RX ADMIN — ENOXAPARIN SODIUM 40 MG: 100 INJECTION SUBCUTANEOUS at 04:02

## 2019-02-02 NOTE — PLAN OF CARE
Problem: Adult Inpatient Plan of Care  Goal: Plan of Care Review  Outcome: Ongoing (interventions implemented as appropriate)  Afebrile. Free from falls or injury. No complaints of pain. Prednisone given as scheduled. NGT to right nare with tube feeding at 65ml/hr. Remains NPO except for ice chips. Suction at bedside, done per pt. Bed locked in lowest position, non skid socks on, call light within reach. Pt instructed to call if any assistance is needed. Vitals stable. Will cont to musa pt.

## 2019-02-02 NOTE — PROGRESS NOTES
[facility]  Hematology/Oncology  Progress Note    Patient Name: Ermelinda Verde  Admission Date: 1/22/2019  Hospital Length of Stay: 11 days  Code Status: Full Code     Subjective:     HPI:  Ms Verde is a 57 yo F with a prior diagnosis of L sided breast cancer, s/p 3 cycles of nab-paclitaxel and atezolizumab who presents from clinic with a roughly week long, multi-day history of proximal muscle weakness in the shoulder girdle muscles, bilateral and associated with mild tenderness. She reports generalized weakness, but strength impairment with the use of her upper extremities more than lower extremities, and her ADLs are intact. Her last PET scan in December 2018 showed almost complete resolution of her adenopathy, and she had been complaining of a rash, which had been attributed to Nab paclitaxel. Last week, 1/15, she had a CT neck/ since there was concern for facial swelling per symptoms, and the CT showed lymphadenopathy without airway or vascular compromise. 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 Most recently, the patient had been on 60mg of prednisone with a taper and had noted swelling, proximal weakness. She also notes some progressive swallowing difficulty, but attributes this to candidal thrush for which she takes nystatin scheduled. She reports poor PO intake preceding admission, dark, mario colored urine, but denies fevers or joint pain.      Onc History per Dr. Reyna:   She developed a palpable abnormality in her left breast in January 2017 which she noted on self-examination.  A diagnostic mammogram on January 19 showed a greater than 1 cm nodule in the upper outer portion of left breast.  By ultrasound this was lobulated and hypoechoic measuring 1.75 x 1.51 x 1.96 cm.     On January 24,  2017 a core needle biopsy was performed which showed infiltrating ductal carcinoma, high grade.  The tumor was ER negative, OK negative, and HER-2 negative.  A follow-up ultrasound on December 6 showed 2.5 x 2.2 x 1.5 cm left breast mass.  There was no abnormality noted in the left axilla.     She underwent sentinel lymph node biopsy on February 22.  That showed 4 negative lymph nodes.     She had 4 cycles of  Wilbur-adjuvantTaxotere and Cytoxan completed  on 5/9/17.     On June 26 she underwent left mastectomy.  That revealed 2 foci of invasive high-grade carcinoma measuring 14 mm and 1.5 mm.  Margins were negative.     She completed 4 cycles of adjuvant Adriamycin on September 12, 2017.     In October, she developed some left supraclavicular lymphadenopathy which turned out to be recurrence.     A fine-needle aspirate of the lymph node was performed on October 19th.  That showed metastatic carcinoma consistent with breast primary which was ER negative, OK negative and HER 2-negative.           Interval History:   NAEO, pt still w/ irritating productive cough, possibly exacerbated by NGT but feels weakness/myopathy slowly improving. Continues NPO on tube feeds     Oncology Treatment Plan:   OP BREAST DOCETAXEL Q3W    Medications:  Continuous Infusions:  Scheduled Meds:   acetaminophen  650 mg Per NG tube Q24H    amLODIPine  10 mg Per NG tube Daily    diphenhydramine (BENADRYL) IVPB  50 mg Intravenous Q24H    enoxaparin  40 mg Subcutaneous Daily    escitalopram oxalate  10 mg Per NG tube Daily    famotidine (PF)  20 mg Intravenous Q24H    folic acid  1 mg Oral Daily    guaifenesin 100 mg/5 ml  200 mg Per NG tube Q4H    Immune Globulin G (IGG)-PRO-IGA 10 % injection (Privigen)  400 mg/kg/day Intravenous Q24H    methylPREDNISolone sodium succinate  30 mg Intravenous Q12H     PRN Meds:acetaminophen, ALPRAZolam, dextrose 50%, dextrose 50%, famotidine (PF), glucagon (human recombinant), glucose, glucose, hepatitis  B, hydrALAZINE, insulin aspart U-100, ondansetron, oxyCODONE, pneumoc 13-brandin conj-dip cr(PF), polyethylene glycol, promethazine, sodium chloride 0.9%, sodium chloride 0.9%     Review of Systems   Constitutional: Positive for fatigue. Negative for activity change, appetite change, chills, diaphoresis, fever and unexpected weight change.   HENT: Positive for facial swelling and trouble swallowing. Negative for congestion, ear discharge, hearing loss, postnasal drip, sinus pressure, sneezing, sore throat and tinnitus.    Eyes: Negative for photophobia, pain and redness.   Respiratory: Negative for apnea, cough, choking, chest tightness, shortness of breath and stridor.    Cardiovascular: Negative for chest pain, palpitations and leg swelling.   Gastrointestinal: Negative for abdominal pain, anal bleeding, constipation, diarrhea, nausea and rectal pain.   Endocrine: Negative for polyuria.   Genitourinary: Negative for dysuria and hematuria.   Musculoskeletal: Positive for myalgias. Negative for arthralgias, back pain, gait problem, joint swelling, neck pain and neck stiffness.   Skin: Positive for rash. Negative for color change and pallor.   Allergic/Immunologic: Negative for immunocompromised state.   Neurological: Positive for weakness. Negative for dizziness, seizures and numbness.   Hematological: Positive for adenopathy. Does not bruise/bleed easily.   Psychiatric/Behavioral: Negative for agitation and behavioral problems.     Objective:     Vital Signs (Most Recent):  Temp: 99 °F (37.2 °C) (02/02/19 0753)  Pulse: 98 (02/02/19 0753)  Resp: 17 (02/02/19 0753)  BP: 131/66 (02/02/19 0753)  SpO2: 100 % (02/02/19 0753) Vital Signs (24h Range):  Temp:  [98.2 °F (36.8 °C)-99 °F (37.2 °C)] 99 °F (37.2 °C)  Pulse:  [] 98  Resp:  [15-18] 17  SpO2:  [95 %-100 %] 100 %  BP: (117-152)/(62-81) 131/66     Weight: 76.1 kg (167 lb 12.3 oz)  Body mass index is 27.92 kg/m².  Body surface area is 1.87 meters  squared.      Intake/Output Summary (Last 24 hours) at 2/2/2019 0900  Last data filed at 2/2/2019 0800  Gross per 24 hour   Intake 1734 ml   Output 850 ml   Net 884 ml       Physical Exam   Constitutional: She is oriented to person, place, and time. She appears well-developed and well-nourished. No distress.   HENT:   Head: Atraumatic.   Nose: Nose normal.   Mouth/Throat: No oropharyngeal exudate.   Face appears swollen   Eyes: Conjunctivae and EOM are normal. Pupils are equal, round, and reactive to light. Right eye exhibits no discharge. Left eye exhibits no discharge. No scleral icterus.   Neck: Normal range of motion. Neck supple. No tracheal deviation present.   Cardiovascular: Normal rate, regular rhythm and intact distal pulses. Exam reveals no gallop and no friction rub.   Pulmonary/Chest: Effort normal and breath sounds normal. No respiratory distress. She has no wheezes. She has no rales. She exhibits no tenderness.   Abdominal: Soft. Bowel sounds are normal. She exhibits no distension and no mass. There is no tenderness. There is no rebound and no guarding.   Musculoskeletal: Normal range of motion. She exhibits edema (facial edema, upper extremity edema b/l, reduced from admission). She exhibits no tenderness or deformity.   Neurological: She is alert and oriented to person, place, and time. No cranial nerve deficit or sensory deficit.     4/5 shoulder strength on shoulder abduction; improving  5/5 flexion and extension preserved at elbow   Skin: Skin is warm and dry. Capillary refill takes less than 2 seconds. No rash noted. She is not diaphoretic. No erythema. No pallor.   Psychiatric: She has a normal mood and affect.   Vitals reviewed.      Significant Labs:   BMP:   Recent Labs   Lab 02/01/19 0412 02/02/19  0444   * 151*   * 135*   K 4.0 4.4    101   CO2 29 30*   BUN 25* 25*   CREATININE 0.6 0.6   CALCIUM 8.5* 8.5*   MG 2.1 2.1   , CBC:   Recent Labs   Lab 02/01/19  0412  02/02/19 0444   WBC 8.24 10.46   HGB 9.2* 8.8*   HCT 29.6* 28.3*   * 126*   , CMP:   Recent Labs   Lab 02/01/19 0412 02/02/19 0444   * 135*   K 4.0 4.4    101   CO2 29 30*   * 151*   BUN 25* 25*   CREATININE 0.6 0.6   CALCIUM 8.5* 8.5*   PROT 7.1 7.2   ALBUMIN 2.6* 2.5*   BILITOT 0.4 0.3   ALKPHOS 69 74   * 93*   ALT 46* 40   ANIONGAP 5* 4*   EGFRNONAA >60.0 >60.0   , Coagulation: No results for input(s): PT, INR, APTT in the last 48 hours., Haptoglobin: No results for input(s): HAPTOGLOBIN in the last 48 hours., Immunology: No results for input(s): SPEP, ERVIN, DARCY, FREELAMBDALI in the last 48 hours., LDH: No results for input(s): LDHCSF, BFSOURCE in the last 48 hours., LFTs:   Recent Labs   Lab 02/01/19 0412 02/02/19 0444   ALT 46* 40   * 93*   ALKPHOS 69 74   BILITOT 0.4 0.3   PROT 7.1 7.2   ALBUMIN 2.6* 2.5*   , Reticulocytes: No results for input(s): RETIC in the last 48 hours., Tumor Markers: No results for input(s): PSA, CEA, , AFPTM, [id number],  in the last 48 hours.    Invalid input(s): ALGTM, Uric Acid No results for input(s): URICACID in the last 48 hours., Urine Studies: No results for input(s): COLORU, APPEARANCEUA, PHUR, SPECGRAV, PROTEINUA, GLUCUA, KETONESU, BILIRUBINUA, OCCULTUA, NITRITE, UROBILINOGEN, LEUKOCYTESUR, RBCUA, WBCUA, BACTERIA, SQUAMEPITHEL, HYALINECASTS in the last 48 hours.    Invalid input(s): YANET,   Recent Lab Results       02/02/19 0444        Immature Granulocytes 0.8     Immature Grans (Abs) 0.08  Comment:  Mild elevation in immature granulocytes is non specific and   can be seen in a variety of conditions including stress response,   acute inflammation, trauma and pregnancy. Correlation with other   laboratory and clinical findings is essential.       Albumin 2.5     Alkaline Phosphatase 74     ALT 40     Anion Gap 4     AST 93     Baso # 0.00     Basophil% 0.0     Total Bilirubin 0.3  Comment:  For infants and newborns,  interpretation of results should be based  on gestational age, weight and in agreement with clinical  observations.  Premature Infant recommended reference ranges:  Up to 24 hours.............<8.0 mg/dL  Up to 48 hours............<12.0 mg/dL  3-5 days..................<15.0 mg/dL  6-29 days.................<15.0 mg/dL       BUN, Bld 25     Calcium 8.5     Chloride 101     CO2 30          Creatinine 0.6     Differential Method Automated     eGFR if African American >60.0     eGFR if non  >60.0  Comment:  Calculation used to obtain the estimated glomerular filtration  rate (eGFR) is the CKD-EPI equation.        Eos # 0.0     Eosinophil% 0.0     Glucose 151     Gran # (ANC) 9.1     Gran% 86.6     Hematocrit 28.3     Hemoglobin 8.8     Lymph # 0.6     Lymph% 5.9     Magnesium 2.1     MCH 27.4     MCHC 31.1     MCV 88     Mono # 0.7     Mono% 6.7     MPV 9.9     nRBC 0     Phosphorus 2.3     Platelets 126     Potassium 4.4     Total Protein 7.2     RBC 3.21     RDW 18.5     Sodium 135     WBC 10.46        and All pertinent labs from the last 24 hours have been reviewed.    Diagnostic Results:  I have reviewed and interpreted all pertinent imaging results/findings within the past 24 hours.    Assessment/Plan:     * Myositis    - DDX dermatomyositis de haylie vs related to immunotherapy  - trend CPK daily; downtrending generally with a slight bump on 01/27  -restarted fluids 01/26 due to NPO  - myositis labs suspicious for myosits; rheum on board.   - F/u 1/24 skin biopsy. Pending results may need muscle biopsy by general surgery  -MRI left humerus suspicious for myositis    Per rheumatology  - Methylpred 125mg IV bid started, now tapered to 24mg IV bid  - Starting MTX 15mg subQ qWeekly  - IVIG 400mg/kg x5 days (2/2 is last day)  - PFTs, myomarker panel, HMGCR, QG-TB pending  - Quantitative IgA 533, IgG 932, IgM 66  - ID consulted for fast track vacc  - f/u with Dr. Swift and Dr. Campos in  Rheumatology at discharge  - Currently tapering steroids per rheumatology    Lab Results   Component Value Date     (H) 02/02/2019     (H) 02/01/2019    CPK 1147 (H) 01/31/2019    CPK 1424 (H) 01/30/2019    CPK 1789 (H) 01/29/2019          Hypokalemia    Replete prn     Dysphagia    Patient is experiencing difficulty swallowing that has been increasing in severity over last couple of days to the point where patient did not feel as if she could swallow.  As of 01/26, SLP evaluated patient and determined that she should be NPO except for medications until a modified barium swallow could be performed. Possibly candidal esophagitis; patient has oral thrush.    Plan:  - High aspiration risk on modified barium study, strict NPO  - NGT in situ, tube feeds initiated per dietary consult  - Switched to IV meds where possible  - Fluconazole IVPB 200mg q24hr  - GI consulted, EGD pending today to r/o candidal esophagitis vs dermatomyositis motility dysfunction --> resulted w/ Grade C erosive esophagitis, no candidal infxn identified. Bx obtained, GI recs start PPI bid  - Pending swallow trial for NGT removal  - Will initiate PPI following IVIG completion     Swelling of upper arm    -B/L U/S negative for DVTs  -appears less edematous than day prior  -continue to monitor     Dermatitis    - Skin biopsy from 1/24 pending, appreciate dermatology recs     Candidiasis    Oral thrush  -cont Nystatin swish and swallow. Additionally, added fluconaozle 200 mg Po qday on 01/26 as possibility patient is experiencing esophagitis 2/2 to candida  -appears resolved now s/p fluconazole and nystatin     Rhabdomyolysis    -see myositis. Continue supportive care, Cr/ electrolyte/ CPK monitoring and aggressive hydration. transaminitis on CMP is likely due to skeletal muscle rhabdo, not hepatic     Drug rash    - Unclear if symptoms related to chemotherapy     Pre-diabetes    -check a1c, 0-5u SSI while on steroids, add prandial insulin  as warranted.      Adjustment disorder with depressed mood    -cont home SSRI     Vitamin B12 deficiency    -cont home B12 supplementation      Breast cancer of upper-outer quadrant of left female breast    -On January 24, 2017 a core needle biopsy was performed which showed infiltrating ductal carcinoma, high grade.  The tumor was ER negative, LA negative, and HER-2 negative.  -She had 4 cycles of  Wilbur-adjuvantTaxotere and Cytoxan completed  on 5/9/17.  -On June 26 she underwent left mastectomy.  That revealed 2 foci of invasive high-grade carcinoma measuring 14 mm and 1.5 mm.  Margins were negative.  -She completed 4 cycles of adjuvant Adriamycin on September 12, 2017.  -  A fine-needle aspirate of the lymph node was performed on October 19th.  That showed metastatic carcinoma consistent with breast primary which was ER negative, LA negative and HER 2-negative.    -Per rheumatology: hold paclitaxel agent and atezolizumb at this time - both can cause myositis      Hypertension    -on home amlodipine-benazepril; will hold ACEi for now and observe renal function with CPK elevation in rhabdo.     Plan:  - Amlodipine 10 mg qday on admission  - 1/28 Pt now strict NPO due to failed swallow study, now on hydralazine 10mg IV q8h WCTM and adjust PRN              Noah Patel MD  Hematology/Oncology  [facility]      I have reviewed the notes, assessments, and/or procedures performed by the housestaff, as above.  I have personally interviewed and examined the patient at the beside, and rounded with the housestaff. I concur with her/his assessment and plan and the documentation of Ermelinda Verde.  I, Dr. Conrad Cheung, personally spent more than  35 mins during this encounter, greater than 50% was spent in direct counseling and/or coordination of care.     Conrad Cheung M.D., M.S., F.A.C.P.  Hematology/Oncology Attending  [facility]

## 2019-02-02 NOTE — ASSESSMENT & PLAN NOTE
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   - F/u 1/24 skin biopsy. Pending results may need muscle biopsy by general surgery  -MRI left humerus suspicious for myositis    Per rheumatology  - Methylpred 125mg IV bid started, now tapered to 50mg IV bid  - Starting MTX 15mg subQ qWeekly  - IVIG 400mg/kg x5 days (1/31 is day 3/5)  - PFTs, myomarker panel, HMGCR, QG-TB pending  - Quantitative IgA 533, IgG 932, IgM 66  - ID consulted for fast track vacc  - f/u with Dr. Swift and Dr. Campos in Rheumatology at discharge  - will need to find out taper to oral steroids

## 2019-02-02 NOTE — ASSESSMENT & PLAN NOTE
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   - F/u 1/24 skin biopsy. Pending results may need muscle biopsy by general surgery  -MRI left humerus suspicious for myositis    Per rheumatology  - Methylpred 125mg IV bid started, now tapered to 24mg IV bid  - Starting MTX 15mg subQ qWeekly  - IVIG 400mg/kg x5 days (2/2 is last day)  - PFTs, myomarker panel, HMGCR, QG-TB pending  - Quantitative IgA 533, IgG 932, IgM 66  - ID consulted for fast track vacc  - f/u with Dr. Swift and Dr. Campos in Rheumatology at discharge  - Currently tapering steroids per rheumatology    Lab Results   Component Value Date     (H) 02/02/2019     (H) 02/01/2019    CPK 1147 (H) 01/31/2019    CPK 1424 (H) 01/30/2019    CPK 1789 (H) 01/29/2019

## 2019-02-02 NOTE — ASSESSMENT & PLAN NOTE
Patient is experiencing difficulty swallowing that has been increasing in severity over last couple of days to the point where patient did not feel as if she could swallow.  As of 01/26, SLP evaluated patient and determined that she should be NPO except for medications until a modified barium swallow could be performed. Possibly candidal esophagitis; patient has oral thrush.    Plan:  - High aspiration risk on modified barium study, strict NPO  - NGT in situ, tube feeds initiated per dietary consult  - Switched to IV meds where possible  - Fluconazole IVPB 200mg q24hr  - GI consulted, EGD pending today to r/o candidal esophagitis vs dermatomyositis motility dysfunction --> resulted w/ Grade C erosive esophagitis, no candidal infxn identified. Bx obtained, GI recs start PPI bid  - Pending swallow trial for NGT removal  - Will initiate PPI following IVIG completion

## 2019-02-02 NOTE — PROGRESS NOTES
[facility]  Rheumatology  Progress Note    Patient Name: Ermelinda Verde  MRN: [id number]  Admission Date: 1/22/2019  Hospital Length of Stay: 11 days  Code Status: Full Code   Attending Provider: Alex Reyna MD  Primary Care Physician: Reta Weeks MD  Principal Problem: Myositis    Subjective:     HPI: 59yo F with history of L sided infiltrating ductal carcinoma of the L breast (dx on Jan 2017), HTN, depression, and gastritis was send from hem/onc clinic for evaluation of possible inflammatory myositis.     L breast cancer s/p completion of 4 cycles of demi-adjuvant taxotere and cytoxan (completed on 5/9/17) and mastectomy (6/26/17) and then 4 cycles of adjuvant Adriamycin (completed 9/12/17). In 10/2017 - patient developed L supraclavicular lymphadenopathy which FNA confirmed as reoccurrence of previously treated breast cancer.     Patient started on Atelizumab/Abraxane on 11/7/18. Per chart review, patient noticed rashes on the dorsum of her hands on 11/11/18. Seen in urgent care and given topical steroid cream. Then received two more infusions on Atelizumab/Abraxane on 11/21/18 and 12/5/18. Started to notice puffiness around the eyes on 12/11/18. Received another Abraxane infusion on 12/12/18 but this time with hydrocortisone 50 mg which helped reduce the swelling around the eyes. Developed swelling of the face again on 12/15/18. Next infusion of Abraxane done on 12/19/18 with Solucortef and Atelizumab held. Abraxane given again on 1/3/19 with no IV steroid. Patient developed swelling of the face on 1/4/19 and went to urgent care and given short course of prednisone 20 mg BID. On 1/15/19, patient seen in hem/onc clinic with c/o of pressure and tightness around neck. CT scan of chest and neck did not reveal any vascular compression. Started on prednisone 60 mg with taper. On 1/22/18 patient with c/o proximal muscle weakness. CPK found to be elevated around 4k. Patient admitted and given  solumedrol 80 mg IV on 1/22/18. Last infusion of Atelizumab on 12/19/18. Last infusion of Abraxane on 1/3/19. Given Solumedrol 1g x 1 on 1/23/18. Seen by Dermatology on 1/24/18 and biopsy done of skin rash. Given distribution of rashes, there was concern for dermatomyositis. Patient started on Solumedrol 125 mg IV BID at this time.     Denies any family history of autoimmune diseases.    No smoking, EtOH, recreational drug usage.    Denies any photosensitivity, joint swelling, unintentional weigh loss, abdominal pain, night sweats, CP, SOB.  +oral thrush.     Interval History: Patient seen at bedside. Strength improving as well as swallowing. Pending repeat swallow eval on Monday.  On day 5 of IVIG (last day). Remains NPO. No new rashes. No trouble breathing, f/c, n/v/d. Started on vaccinations.     Current Facility-Administered Medications   Medication Frequency    acetaminophen tablet 650 mg Q4H PRN    acetaminophen tablet 650 mg Q24H    ALPRAZolam tablet 0.25 mg TID PRN    amLODIPine tablet 10 mg Daily    dextrose 50% injection 12.5 g PRN    dextrose 50% injection 25 g PRN    diphenhydrAMINE (BENADRYL) 50 mg in sodium chloride 0.9% 50 mL IVPB Q24H    enoxaparin injection 40 mg Daily    escitalopram oxalate tablet 10 mg Daily    famotidine (PF) injection 20 mg PRN    famotidine (PF) injection 20 mg Q24H    folic acid tablet 1 mg Daily    glucagon (human recombinant) injection 1 mg PRN    glucose chewable tablet 16 g PRN    glucose chewable tablet 24 g PRN    guaifenesin 100 mg/5 ml syrup 200 mg Q4H    hepatitis B (HEPLISAV-B) 20 mcg/0.5 mL vaccine 0.5 mL vaccine x 1 dose    hydrALAZINE injection 10 mg Q6H PRN    Immune Globulin G (IGG)-PRO-IGA 10 % injection (Privigen) 10 % injection 30 g Q24H    insulin aspart U-100 pen 0-5 Units QID (AC + HS) PRN    methylPREDNISolone sodium succinate injection 30 mg Q12H    ondansetron disintegrating tablet 8 mg Q8H PRN    oxyCODONE immediate release  tablet 5 mg Q6H PRN    pneumoc 13-brandin conj-dip cr(PF) (PREVNAR 13 (PF)) 0.5 mL vaccine x 1 dose    polyethylene glycol packet 17 g BID PRN    promethazine tablet 12.5 mg Q6H PRN    sodium chloride 0.9% flush 10 mL PRN    sodium chloride 0.9% flush 5 mL PRN     Objective:     Vital Signs (Most Recent):  Temp: 99 °F (37.2 °C) (02/02/19 0753)  Pulse: 98 (02/02/19 0753)  Resp: 17 (02/02/19 0753)  BP: 131/66 (02/02/19 0753)  SpO2: 100 % (02/02/19 0753)  O2 Device (Oxygen Therapy): room air (02/02/19 0753) Vital Signs (24h Range):  Temp:  [98.2 °F (36.8 °C)-99 °F (37.2 °C)] 99 °F (37.2 °C)  Pulse:  [] 98  Resp:  [15-18] 17  SpO2:  [95 %-100 %] 100 %  BP: (117-152)/(62-81) 131/66     Weight: 76.1 kg (167 lb 12.3 oz) (02/02/19 0400)  Body mass index is 27.92 kg/m².  Body surface area is 1.87 meters squared.      Intake/Output Summary (Last 24 hours) at 2/2/2019 0936  Last data filed at 2/2/2019 0800  Gross per 24 hour   Intake 1734 ml   Output 850 ml   Net 884 ml       Physical Exam   Constitutional: She is oriented to person, place, and time and well-developed, well-nourished, and in no distress. No distress.   HENT:   Head: Normocephalic and atraumatic.   Right Ear: External ear normal.   Left Ear: External ear normal.   Bilateral orbital edema with heliotropic rash - resolved   Eyes: Conjunctivae and EOM are normal. Pupils are equal, round, and reactive to light.   Neck: Normal range of motion. Neck supple.   Cardiovascular: Normal rate, regular rhythm, normal heart sounds and intact distal pulses.    Pulmonary/Chest: Effort normal and breath sounds normal. No respiratory distress.   Abdominal: Soft. Bowel sounds are normal.       Right Side Rheumatological Exam     Muscle Strength (0-5 scale):  Neck Flexion:  4.8  Neck Extension: 5  Deltoid:  4.2  Biceps: 5/5   Triceps:  4.6  : 5/5   Iliopsoas: 4.6  Quadriceps:  5   Distal Lower Extremity: 5    Left Side Rheumatological Exam     Muscle Strength (0-5  scale):  Neck Flexion:  4.8  Neck Extension: 5  Deltoid:  4.2  Biceps: 5/5   Triceps:  4.6  :  5/5   Iliopsoas: 4.6  Quadriceps:  5   Distal Lower Extremity: 5      Neurological: She is alert and oriented to person, place, and time. GCS score is 15.   Skin: Skin is warm and dry. No rash noted.     Hyperpigmented non raised rash over nape of neck, elbows, knuckles (resemble Gottron's papules), thighs, and ankles -resolving       Psychiatric: Mood, memory, affect and judgment normal.   Musculoskeletal: Normal range of motion. She exhibits edema. She exhibits no tenderness or deformity.         Significant Labs:  Recent Results (from the past 48 hour(s))   Comprehensive Metabolic Panel (CMP)    Collection Time: 02/01/19  4:12 AM   Result Value Ref Range    Sodium 135 (L) 136 - 145 mmol/L    Potassium 4.0 3.5 - 5.1 mmol/L    Chloride 101 95 - 110 mmol/L    CO2 29 23 - 29 mmol/L    Glucose 163 (H) 70 - 110 mg/dL    BUN, Bld 25 (H) 6 - 20 mg/dL    Creatinine 0.6 0.5 - 1.4 mg/dL    Calcium 8.5 (L) 8.7 - 10.5 mg/dL    Total Protein 7.1 6.0 - 8.4 g/dL    Albumin 2.6 (L) 3.5 - 5.2 g/dL    Total Bilirubin 0.4 0.1 - 1.0 mg/dL    Alkaline Phosphatase 69 55 - 135 U/L     (H) 10 - 40 U/L    ALT 46 (H) 10 - 44 U/L    Anion Gap 5 (L) 8 - 16 mmol/L    eGFR if African American >60.0 >60 mL/min/1.73 m^2    eGFR if non African American >60.0 >60 mL/min/1.73 m^2   Magnesium    Collection Time: 02/01/19  4:12 AM   Result Value Ref Range    Magnesium 2.1 1.6 - 2.6 mg/dL   Phosphorus    Collection Time: 02/01/19  4:12 AM   Result Value Ref Range    Phosphorus 1.8 (L) 2.7 - 4.5 mg/dL   CK    Collection Time: 02/01/19  4:12 AM   Result Value Ref Range     (H) 20 - 180 U/L   CBC auto differential    Collection Time: 02/01/19  4:12 AM   Result Value Ref Range    WBC 8.24 3.90 - 12.70 K/uL    RBC 3.45 (L) 4.00 - 5.40 M/uL    Hemoglobin 9.2 (L) 12.0 - 16.0 g/dL    Hematocrit 29.6 (L) 37.0 - 48.5 %    MCV 86 82 - 98 fL    MCH  26.7 (L) 27.0 - 31.0 pg    MCHC 31.1 (L) 32.0 - 36.0 g/dL    RDW 18.2 (H) 11.5 - 14.5 %    Platelets 139 (L) 150 - 350 K/uL    MPV 11.0 9.2 - 12.9 fL    Immature Granulocytes 0.8 (H) 0.0 - 0.5 %    Gran # (ANC) 7.3 1.8 - 7.7 K/uL    Immature Grans (Abs) 0.07 (H) 0.00 - 0.04 K/uL    Lymph # 0.4 (L) 1.0 - 4.8 K/uL    Mono # 0.5 0.3 - 1.0 K/uL    Eos # 0.0 0.0 - 0.5 K/uL    Baso # 0.00 0.00 - 0.20 K/uL    nRBC 0 0 /100 WBC    Gran% 88.4 (H) 38.0 - 73.0 %    Lymph% 4.7 (L) 18.0 - 48.0 %    Mono% 6.1 4.0 - 15.0 %    Eosinophil% 0.0 0.0 - 8.0 %    Basophil% 0.0 0.0 - 1.9 %    Differential Method Automated    Comprehensive Metabolic Panel (CMP)    Collection Time: 02/02/19  4:44 AM   Result Value Ref Range    Sodium 135 (L) 136 - 145 mmol/L    Potassium 4.4 3.5 - 5.1 mmol/L    Chloride 101 95 - 110 mmol/L    CO2 30 (H) 23 - 29 mmol/L    Glucose 151 (H) 70 - 110 mg/dL    BUN, Bld 25 (H) 6 - 20 mg/dL    Creatinine 0.6 0.5 - 1.4 mg/dL    Calcium 8.5 (L) 8.7 - 10.5 mg/dL    Total Protein 7.2 6.0 - 8.4 g/dL    Albumin 2.5 (L) 3.5 - 5.2 g/dL    Total Bilirubin 0.3 0.1 - 1.0 mg/dL    Alkaline Phosphatase 74 55 - 135 U/L    AST 93 (H) 10 - 40 U/L    ALT 40 10 - 44 U/L    Anion Gap 4 (L) 8 - 16 mmol/L    eGFR if African American >60.0 >60 mL/min/1.73 m^2    eGFR if non African American >60.0 >60 mL/min/1.73 m^2   Magnesium    Collection Time: 02/02/19  4:44 AM   Result Value Ref Range    Magnesium 2.1 1.6 - 2.6 mg/dL   Phosphorus    Collection Time: 02/02/19  4:44 AM   Result Value Ref Range    Phosphorus 2.3 (L) 2.7 - 4.5 mg/dL   CK    Collection Time: 02/02/19  4:44 AM   Result Value Ref Range     (H) 20 - 180 U/L   CBC auto differential    Collection Time: 02/02/19  4:44 AM   Result Value Ref Range    WBC 10.46 3.90 - 12.70 K/uL    RBC 3.21 (L) 4.00 - 5.40 M/uL    Hemoglobin 8.8 (L) 12.0 - 16.0 g/dL    Hematocrit 28.3 (L) 37.0 - 48.5 %    MCV 88 82 - 98 fL    MCH 27.4 27.0 - 31.0 pg    MCHC 31.1 (L) 32.0 - 36.0 g/dL     RDW 18.5 (H) 11.5 - 14.5 %    Platelets 126 (L) 150 - 350 K/uL    MPV 9.9 9.2 - 12.9 fL    Immature Granulocytes 0.8 (H) 0.0 - 0.5 %    Gran # (ANC) 9.1 (H) 1.8 - 7.7 K/uL    Immature Grans (Abs) 0.08 (H) 0.00 - 0.04 K/uL    Lymph # 0.6 (L) 1.0 - 4.8 K/uL    Mono # 0.7 0.3 - 1.0 K/uL    Eos # 0.0 0.0 - 0.5 K/uL    Baso # 0.00 0.00 - 0.20 K/uL    nRBC 0 0 /100 WBC    Gran% 86.6 (H) 38.0 - 73.0 %    Lymph% 5.9 (L) 18.0 - 48.0 %    Mono% 6.7 4.0 - 15.0 %    Eosinophil% 0.0 0.0 - 8.0 %    Basophil% 0.0 0.0 - 1.9 %    Differential Method Automated      Results for ROMEO PEREZ (MRN [id number]) as of 2/2/2019 09:37   Ref. Range 1/29/2019 05:00 1/30/2019 05:45 1/31/2019 04:00 2/1/2019 04:12 2/2/2019 04:44   CPK Latest Ref Range: 20 - 180 U/L 1789 (H) 1424 (H) 1147 (H) 827 (H) 662 (H)     Results for ROMEO PEREZ (MRN [id number]) as of 1/28/2019 10:23   Ref. Range 1/24/2019 03:40 1/25/2019 04:20 1/26/2019 04:00 1/27/2019 04:21 1/28/2019 04:00   Aldolase Latest Ref Range: 1.2 - 7.6 U/L 12.7 (H) 14.2 (H) 13.7 (H) 13.1 (H) 15.4 (H)   Results for ROMEO PEREZ (MRN [id number]) as of 1/28/2019 10:23   Ref. Range 1/22/2019 22:24   Sed Rate Latest Ref Range: 0 - 36 mm/Hr 50 (H)     Results for ROMEO PEREZ (MRN [id number]) as of 1/28/2019 10:23   Ref. Range 1/22/2019 22:24   CRP Latest Ref Range: 0.0 - 8.2 mg/L 31.1 (H)      Results for ROMEO PEREZ (MRN [id number]) as of 1/28/2019 10:23   Ref. Range 1/22/2019 22:24 1/25/2019 17:33   DARCY HEP-2 Titer Unknown Positive 1:640 Sp...    Anti-SSA Antibody Latest Ref Range: 0.00 - 19.99 EU 0.49    Anti-SSA Interpretation Latest Ref Range: Negative  Negative    Anti-SSB Antibody Latest Ref Range: 0.00 - 19.99 EU 0.11    Anti-SSB Interpretation Latest Ref Range: Negative  Negative    ds DNA Ab Latest Ref Range: Negative 1:10  Negative 1:10    Anti Sm Antibody Latest Ref Range: 0.00 - 19.99 EU 0.58    Anti-Sm Interpretation Latest Ref Range: Negative  Negative    Anti Sm/RNP  Antibody Latest Ref Range: 0.00 - 19.99 EU 0.62    Anti-Sm/RNP Interpretation Latest Ref Range: Negative  Negative    DARCY Screen Latest Ref Range: Negative <1:160  Positive (A)    Complement (C-3) Latest Ref Range: 50 - 180 mg/dL  106   Complement (C-4) Latest Ref Range: 11 - 44 mg/dL  29   Meg-1 Autoantibodies Latest Ref Range: <1.0 Index <1.00    Results for ROMEO PEREZ (MRN [id number]) as of 1/28/2019 10:23   Ref. Range 1/23/2019 02:55 1/23/2019 02:56   Specimen UA Unknown Urine, Clean Catch    Color, UA Latest Ref Range: Yellow, Straw, [place]  Colorless (A)    pH, UA Latest Ref Range: 5.0 - 8.0  6.0    Specific Gravity, UA Latest Ref Range: 1.005 - 1.030  1.005    Appearance, UA Latest Ref Range: Clear  Clear    Protein, UA Latest Ref Range: Negative  Negative    Glucose, UA Latest Ref Range: Negative  Negative    Ketones, UA Latest Ref Range: Negative  Negative    Occult Blood UA Latest Ref Range: Negative  Negative    Nitrite, UA Latest Ref Range: Negative  Negative    Bilirubin (UA) Latest Ref Range: Negative  Negative    Leukocytes, UA Latest Ref Range: Negative  Trace (A)    RBC, UA Latest Ref Range: 0 - 4 /hpf  2   WBC, UA Latest Ref Range: 0 - 5 /hpf  3   Squam Epithel, UA Latest Units: /hpf  0   Microscopic Comment Unknown  SEE COMMENT     Meg-1: Negative    Significant Imaging:  MRI Humerus w/ and w/o contrast:  Impression       1. Diffuse edema and enhancement of the visualized left upper extremity musculature, most pronounced proximally, most notably of the deltoid and biceps musculature as detailed above.  Findings are nonspecific although could relate to a nonspecific myelitis particularly in light of patient's reported history.  Clinical correlation with appropriate history and lab markers advised.  2. No evidence of abscess or osteomyelitis.  This report was flagged in Epic as abnormal.     NM PET CT 12/27/18:  Impression       See above    Significant improvement in all the previously seen lymph  nodes involving the left lower neck, supraclavicular region, axillary and retropectoral region.    Index left retropectoral SUV max 3.57, previously 27.2.     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 Biopsied.                        - Small hiatal hernia.                        - Normal stomach.                        - Normal examined duodenum.  Recommendation:       - Return patient to hospital to for ongoing care.                        - Await pathology results.                        - Use a proton pump inhibitor PO BID.                        - The findings and recommendations were discussed                         with the designated responsible adult.                        - Repeat upper endoscopy in 8 weeks to check                         healing.           Assessment/Plan:     * Myositis    57yo F with history of L sided infiltrating ductal carcinoma of the L breast (dx on Jan 2017), HTN, depression, and gastritis was send from hem/onc clinic for evaluation of possible inflammatory myositis.     Patient started on Atelizumab/Abraxane on 11/7/18. Per chart review, patient noticed rashes on the dorsum of her hands on 11/11/18. Seen in urgent care and given topical steroid cream. Then received two more infusions on Atelizumab/Abraxane on 11/21/18 and 12/5/18. Started to notice puffiness around the eyes on 12/11/18. Received another Abraxane infusion on 12/12/18 but this time with hydrocortisone 50 mg which helped reduce the swelling around the eyes. Developed swelling of the face again on 12/15/18. Next infusion of Abraxane done on 12/19/18 with Solucortef and Atelizumab held. Abraxane given again on 1/3/19 with no IV steroid. Patient developed swelling of the face on 1/4/19 and went to urgent care and given short course of prednisone 20 mg BID. On 1/15/19, patient seen in hem/onc clinic with  "c/o of pressure and tightness around neck. CT scan of chest and neck did not reveal any vascular compression. Started on prednisone 60 mg with taper. On 1/22/18 patient with c/o proximal muscle weakness. CPK found to be elevated around 4k. Patient admitted and given solumedrol 80 mg IV on 1/22/18. Last infusion of Atelizumab on 12/19/18. Last infusion of Abraxane on 1/3/19. Given Solumedrol 1g x 1 on 1/23/18. Seen by Dermatology on 1/24/18 and biopsy done of skin rash. Given distribution of rashes, there was concern for dermatomyositis. Patient started on Solumedrol 125 mg IV BID at this time.      Labs: ESR 50, CRP 31.1, CPK 4562 (trending down), Aldolase 13.6.  , ALT 64.  UA normal.  CBC unremarkable.  +DARCY 1:640 speckled with negative profile.  Complements normal. Normal GGT. RPR, HIV negative. Hep B/C negative.  Strongyloides negative.    Spirometry: normal, normal diffusion capacity. FVC: 81%, DLCO: 114%    Case reports of docetaxel related inflammatory myositis had been documented. "...taxanes have been noted to cause disabling but transient arthralgia and myalgias; it is important to consider the possibility of inflammatory myopathy as a possible complication in patients undergoing treatment with these agents." - A case of docetaxel induced myositis and review of literature. Austin et al 2015.    Case reports in Rheumatology   Case reports of atezolizumb (immunomodulator) cause of myositis  - Lucy et al 2017 "Myositis as an adverse even of immune checkpoint blockade for cancer therapy.  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   Dermatomyositis can be associated with malignancy.  MRI of LUE showed edema of the deltoid and biceps.  Exam with proximal muscle weakness: b/l deltoids 4/5, b/l iliopsoas 4.4/5. 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      Inpatient treatments so far:  - Solumedrol 80 mg IV x 1 on 1/22/19  - Solumedrol 1 g IV x 1 on 1/23/19.  - Solumedrol 125 mg IV BID since 1/24/19 -->being tapered down.  - IVIG 1/29/19-2/2     Plan:  - chemotherapy can be re-started as it may help treat Dermatomyositis. Would recommend against checkpoint inhibitor, however, as it may have been a trigger for the dermatomyositis  - continue to monitor CPK, aldolase, AST/ALT  - Change Solumedrol to 24 mg IV q 12h. Once swallowing re-evaluated can change to Medrol 48 mg oral daily.  - continue IVIg 400 mg/kg daily x 5 days - last day today   - MTX 15 mg sc weekly (Tuesdays) with daily folic acid.   - recommend ID fast track consult for vaccinations.  - f/u Quant TB  - f/u esophageal biopsy results  - PT-->Rehab  - f/u myomarker panel and HMGCR   - PFTs with lung volumes as outpatient.   - 1200 mg dietary calcium and Vitamin D3 1000 mg daily  - continue H2 blocker  - f/u with Dr. Swift and Dr. Campos in Rheumatology on 2/15/19 at 2PM           Wilner Reynolds MD  Rheumatology  [facility]      I  Have personally take the history and examined the patient and agree with fellow's note as stated above.    Decrease Solu-Medrol to 24 mg IV q 12h  Once swallowing re-evaluated, change to Medrol 48mg po daily  Cont methotrexate 15mg sc every Tuesday with folic acid 1mg daily  Completes day #5 IVIg  400mg/kd today  1200 mg dietary calcium daily  Vitamin D3 1000 mg daily  Will need DXA as outpatient  Cont famotidine  Cont PT, agree with eventual transfer to Rehab  Await esophageal biopsies

## 2019-02-02 NOTE — PROGRESS NOTES
[facility]  Hematology/Oncology  Progress Note    Patient Name: Ermelinda Verde  Admission Date: 1/22/2019  Hospital Length of Stay: 10 days  Code Status: Full Code     Subjective:     HPI:  Ms Verde is a 57 yo F with a prior diagnosis of L sided breast cancer, s/p 3 cycles of nab-paclitaxel and atezolizumab who presents from clinic with a roughly week long, multi-day history of proximal muscle weakness in the shoulder girdle muscles, bilateral and associated with mild tenderness. She reports generalized weakness, but strength impairment with the use of her upper extremities more than lower extremities, and her ADLs are intact. Her last PET scan in December 2018 showed almost complete resolution of her adenopathy, and she had been complaining of a rash, which had been attributed to Nab paclitaxel. Last week, 1/15, she had a CT neck/ since there was concern for facial swelling per symptoms, and the CT showed lymphadenopathy without airway or vascular compromise. 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 Most recently, the patient had been on 60mg of prednisone with a taper and had noted swelling, proximal weakness. She also notes some progressive swallowing difficulty, but attributes this to candidal thrush for which she takes nystatin scheduled. She reports poor PO intake preceding admission, dark, mario colored urine, but denies fevers or joint pain.      Onc History per Dr. Reyna:   She developed a palpable abnormality in her left breast in January 2017 which she noted on self-examination.  A diagnostic mammogram on January 19 showed a greater than 1 cm nodule in the upper outer portion of left breast.  By ultrasound this was lobulated and hypoechoic measuring 1.75 x 1.51 x 1.96 cm.     On January 24,  2017 a core needle biopsy was performed which showed infiltrating ductal carcinoma, high grade.  The tumor was ER negative, RI negative, and HER-2 negative.  A follow-up ultrasound on December 6 showed 2.5 x 2.2 x 1.5 cm left breast mass.  There was no abnormality noted in the left axilla.     She underwent sentinel lymph node biopsy on February 22.  That showed 4 negative lymph nodes.     She had 4 cycles of  Wilbur-adjuvantTaxotere and Cytoxan completed  on 5/9/17.     On June 26 she underwent left mastectomy.  That revealed 2 foci of invasive high-grade carcinoma measuring 14 mm and 1.5 mm.  Margins were negative.     She completed 4 cycles of adjuvant Adriamycin on September 12, 2017.     In October, she developed some left supraclavicular lymphadenopathy which turned out to be recurrence.     A fine-needle aspirate of the lymph node was performed on October 19th.  That showed metastatic carcinoma consistent with breast primary which was ER negative, RI negative and HER 2-negative.           Interval History:   The pt is mentioning that she still had much congestion and feels swelling in submandibular region. She is getting tube feeds as well currently...    Oncology Treatment Plan:   OP BREAST DOCETAXEL Q3W    Medications:  Continuous Infusions:  Scheduled Meds:   acetaminophen  650 mg Per NG tube Q24H    amLODIPine  10 mg Per NG tube Daily    diphenhydramine (BENADRYL) IVPB  50 mg Intravenous Q24H    enoxaparin  40 mg Subcutaneous Daily    escitalopram oxalate  10 mg Per NG tube Daily    famotidine (PF)  20 mg Intravenous Q24H    folic acid  1 mg Oral Daily    guaifenesin 100 mg/5 ml  200 mg Per NG tube Q4H    Immune Globulin G (IGG)-PRO-IGA 10 % injection (Privigen)  400 mg/kg/day Intravenous Q24H    methylPREDNISolone sodium succinate  40 mg Intravenous Q12H     PRN Meds:acetaminophen, ALPRAZolam, dextrose 50%, dextrose 50%, famotidine (PF), glucagon (human recombinant), glucose, glucose, hepatitis B,  hydrALAZINE, insulin aspart U-100, ondansetron, oxyCODONE, pneumoc 13-brandin conj-dip cr(PF), polyethylene glycol, promethazine, sodium chloride 0.9%, sodium chloride 0.9%     Review of Systems   Constitutional: Positive for fatigue. Negative for activity change, appetite change, chills, diaphoresis, fever and unexpected weight change.   HENT: Positive for facial swelling and trouble swallowing. Negative for congestion, ear discharge, hearing loss, postnasal drip, sinus pressure, sneezing, sore throat and tinnitus.    Eyes: Negative for photophobia, pain and redness.   Respiratory: Negative for apnea, cough, choking, chest tightness, shortness of breath and stridor.    Cardiovascular: Negative for chest pain, palpitations and leg swelling.   Gastrointestinal: Negative for abdominal pain, anal bleeding, constipation, diarrhea, nausea and rectal pain.   Endocrine: Negative for polyuria.   Genitourinary: Negative for dysuria and hematuria.   Musculoskeletal: Positive for myalgias. Negative for arthralgias, back pain, gait problem, joint swelling, neck pain and neck stiffness.   Skin: Positive for rash. Negative for color change and pallor.   Allergic/Immunologic: Negative for immunocompromised state.   Neurological: Positive for weakness. Negative for dizziness, seizures and numbness.   Hematological: Positive for adenopathy. Does not bruise/bleed easily.   Psychiatric/Behavioral: Negative for agitation and behavioral problems.     Objective:     Vital Signs (Most Recent):  Temp: 98.6 °F (37 °C) (02/01/19 1720)  Pulse: 81 (02/01/19 1745)  Resp: 15 (02/01/19 1745)  BP: 127/68 (02/01/19 1745)  SpO2: 100 % (02/01/19 1745) Vital Signs (24h Range):  Temp:  [98.1 °F (36.7 °C)-98.7 °F (37.1 °C)] 98.6 °F (37 °C)  Pulse:  [81-97] 81  Resp:  [14-18] 15  SpO2:  [95 %-100 %] 100 %  BP: (117-142)/(62-72) 127/68     Weight: 76.2 kg (168 lb 1.6 oz)  Body mass index is 27.97 kg/m².  Body surface area is 1.87 meters  squared.      Intake/Output Summary (Last 24 hours) at 2/1/2019 1804  Last data filed at 2/1/2019 1743  Gross per 24 hour   Intake 1937 ml   Output 1600 ml   Net 337 ml       Physical Exam   Constitutional: She is oriented to person, place, and time. She appears well-developed and well-nourished. No distress.   HENT:   Head: Atraumatic.   Nose: Nose normal.   Mouth/Throat: No oropharyngeal exudate (white tongue / exudate mostly resolved).   Face appears swollen   Eyes: Conjunctivae and EOM are normal. Pupils are equal, round, and reactive to light. Right eye exhibits no discharge. Left eye exhibits no discharge. No scleral icterus.   Neck: Normal range of motion. Neck supple. No tracheal deviation present.   Cardiovascular: Normal rate, regular rhythm and intact distal pulses. Exam reveals no gallop and no friction rub.   Pulmonary/Chest: Effort normal and breath sounds normal. No respiratory distress. She has no wheezes. She has no rales. She exhibits no tenderness.   Abdominal: Soft. Bowel sounds are normal. She exhibits no distension and no mass. There is no tenderness. There is no rebound and no guarding.   Musculoskeletal: Normal range of motion. She exhibits edema (facial edema, upper extremity edema b/l). She exhibits no tenderness or deformity.   Neurological: She is alert and oriented to person, place, and time. No cranial nerve deficit or sensory deficit.     4/5 shoulder strength on shoulder abduction; improving  5/5 flexion and extension preserved at elbow   Skin: Skin is warm and dry. Capillary refill takes less than 2 seconds. No rash noted. She is not diaphoretic. No erythema. No pallor.   Psychiatric: She has a normal mood and affect.       Significant Labs:   BMP:   Recent Labs   Lab 01/31/19  0400 02/01/19  0412   * 163*   * 135*   K 3.7 4.0    101   CO2 28 29   BUN 24* 25*   CREATININE 0.6 0.6   CALCIUM 8.5* 8.5*   MG 2.3 2.1   , CBC:   Recent Labs   Lab 01/31/19  0400  02/01/19 0412   WBC 10.12 8.24   HGB 9.6* 9.2*   HCT 30.6* 29.6*    139*   , CMP:   Recent Labs   Lab 01/31/19 0400 02/01/19 0412   * 135*   K 3.7 4.0    101   CO2 28 29   * 163*   BUN 24* 25*   CREATININE 0.6 0.6   CALCIUM 8.5* 8.5*   PROT 6.9 7.1   ALBUMIN 2.7* 2.6*   BILITOT 0.5 0.4   ALKPHOS 66 69   * 100*   ALT 52* 46*   ANIONGAP 6* 5*   EGFRNONAA >60.0 >60.0   , Coagulation: No results for input(s): PT, INR, APTT in the last 48 hours., Haptoglobin: No results for input(s): HAPTOGLOBIN in the last 48 hours., Immunology: No results for input(s): SPEP, ERVIN, DARCY, FREELAMBDALI in the last 48 hours., LDH: No results for input(s): LDHCSF, BFSOURCE in the last 48 hours., LFTs:   Recent Labs   Lab 01/31/19 0400 02/01/19 0412   ALT 52* 46*   * 100*   ALKPHOS 66 69   BILITOT 0.5 0.4   PROT 6.9 7.1   ALBUMIN 2.7* 2.6*   , Reticulocytes: No results for input(s): RETIC in the last 48 hours., Tumor Markers: No results for input(s): PSA, CEA, , AFPTM, [id number],  in the last 48 hours.    Invalid input(s): ALGTM, Uric Acid No results for input(s): URICACID in the last 48 hours., Urine Studies: No results for input(s): COLORU, APPEARANCEUA, PHUR, SPECGRAV, PROTEINUA, GLUCUA, KETONESU, BILIRUBINUA, OCCULTUA, NITRITE, UROBILINOGEN, LEUKOCYTESUR, RBCUA, WBCUA, BACTERIA, SQUAMEPITHEL, HYALINECASTS in the last 48 hours.    Invalid input(s): YANET,   Recent Lab Results       02/01/19 0412        Immature Granulocytes 0.8     Immature Grans (Abs) 0.07  Comment:  Mild elevation in immature granulocytes is non specific and   can be seen in a variety of conditions including stress response,   acute inflammation, trauma and pregnancy. Correlation with other   laboratory and clinical findings is essential.       Albumin 2.6     Alkaline Phosphatase 69     ALT 46     Anion Gap 5          Baso # 0.00     Basophil% 0.0     Total Bilirubin 0.4  Comment:  For infants and newborns,  interpretation of results should be based  on gestational age, weight and in agreement with clinical  observations.  Premature Infant recommended reference ranges:  Up to 24 hours.............<8.0 mg/dL  Up to 48 hours............<12.0 mg/dL  3-5 days..................<15.0 mg/dL  6-29 days.................<15.0 mg/dL       BUN, Bld 25     Calcium 8.5     Chloride 101     CO2 29          Creatinine 0.6     Differential Method Automated     eGFR if African American >60.0     eGFR if non  >60.0  Comment:  Calculation used to obtain the estimated glomerular filtration  rate (eGFR) is the CKD-EPI equation.        Eos # 0.0     Eosinophil% 0.0     Glucose 163     Gran # (ANC) 7.3     Gran% 88.4     Hematocrit 29.6     Hemoglobin 9.2     Lymph # 0.4     Lymph% 4.7     Magnesium 2.1     MCH 26.7     MCHC 31.1     MCV 86     Mono # 0.5     Mono% 6.1     MPV 11.0     nRBC 0     Phosphorus 1.8     Platelets 139     Potassium 4.0     Total Protein 7.1     RBC 3.45     RDW 18.2     Sodium 135     WBC 8.24        and All pertinent labs from the last 24 hours have been reviewed.    Diagnostic Results:  I have reviewed and interpreted all pertinent imaging results/findings within the past 24 hours.    Assessment/Plan:     * Myositis    - DDX dermatomyositis de haylie vs related to immunotherapy  - trend CPK daily; downtrending generally with a slight bump on 01/27  -restarted fluids 01/26 due to NPO  - myositis labs suspicious for myosits; rheum on board.   - F/u 1/24 skin biopsy. Pending results may need muscle biopsy by general surgery  -MRI left humerus suspicious for myositis    Per rheumatology  - Methylpred 125mg IV bid started, now tapered to 50mg IV bid  - Starting MTX 15mg subQ qWeekly  - IVIG 400mg/kg x5 days (1/31 is day 3/5)  - PFTs, myomarker panel, HMGCR, QG-TB pending  - Quantitative IgA 533, IgG 932, IgM 66  - ID consulted for fast track vacc  - f/u with Dr. Swift and Dr. Campos in  Rheumatology at discharge  - will need to find out taper to oral steroids        Hypokalemia    Replete prn     Dysphagia    Patient is experiencing difficulty swallowing that has been increasing in severity over last couple of days to the point where patient did not feel as if she could swallow.  As of 01/26, SLP evaluated patient and determined that she should be NPO except for medications until a modified barium swallow could be performed. Possibly candidal esophagitis; patient has oral thrush.    Plan:  - High aspiration risk on modified barium study, strict NPO  - NGT in situ, tube feeds initiated per dietary consult  - Switched to IV meds where possible  - Fluconazole IVPB 200mg q24hr  - GI consulted, EGD pending today to r/o candidal esophagitis vs dermatomyositis motility dysfunction --> resulted w/ Grade C erosive esophagitis, no candidal infxn identified. Bx obtained, GI recs start PPI bid  - Plan swallow trial tomorrow (2/1/2019)  - Will initiate PPI following IVIG completion     Swelling of upper arm    -B/L U/S negative for DVTs  -appears less edematous than day prior  -continue to monitor     Dermatitis    - Skin biopsy from 1/24 pending, appreciate dermatology recs     Candidiasis    -cont Nystatin swish and swallow. Additionally, added fluconaozle 200 mg Po qday on 01/26 as possibility patient is experiencing esophagitis 2/2 to candida  -appears to be improving with fluconazole     Rhabdomyolysis    -see myositis. Continue supportive care, Cr/ electrolyte/ CPK monitoring and aggressive hydration. transaminitis on CMP is likely due to skeletal muscle rhabdo, not hepatic     Drug rash    - Unclear if symptoms related to chemotherapy     Pre-diabetes    -check a1c, 0-5u SSI while on steroids, add prandial insulin as warranted.      Adjustment disorder with depressed mood    -cont home SSRI     Vitamin B12 deficiency    -cont home B12 supplementation      Breast cancer of upper-outer quadrant of left  female breast    -On January 24, 2017 a core needle biopsy was performed which showed infiltrating ductal carcinoma, high grade.  The tumor was ER negative, RI negative, and HER-2 negative.  -She had 4 cycles of  Wilbur-adjuvantTaxotere and Cytoxan completed  on 5/9/17.  -On June 26 she underwent left mastectomy.  That revealed 2 foci of invasive high-grade carcinoma measuring 14 mm and 1.5 mm.  Margins were negative.  -She completed 4 cycles of adjuvant Adriamycin on September 12, 2017.  -  A fine-needle aspirate of the lymph node was performed on October 19th.  That showed metastatic carcinoma consistent with breast primary which was ER negative, RI negative and HER 2-negative.    -Per rheumatology: hold paclitaxel agent and atezolizumb at this time - both can cause myositis      Hypertension    -on home amlodipine-benazepril; will hold ACEi for now and observe renal function with CPK elevation in rhabdo.     Plan:  - Amlodipine 10 mg qday on admission  - 1/28 Pt now strict NPO due to failed swallow study, now on hydralazine 10mg IV q8h WCTM and adjust PRN          Discussed with Dr. Jonatan Mcguire MD  Hematology/Oncology Fellow, PGY IV  [facility]      I have reviewed the notes, assessments, and/or procedures performed by the housestaff, as above.  I have personally interviewed and examined the patient at the beside, and rounded with the housestaff. I concur with her/his assessment and plan and the documentation of Ermelinda Verde.  I, Dr. Conrad Cheung, personally spent more than 35 mins during this encounter, greater than 50% was spent in direct counseling and/or coordination of care.     Conrad Cheung M.D., M.S., F.A.C.P.  Hematology/Oncology Attending  [facility]

## 2019-02-02 NOTE — SUBJECTIVE & OBJECTIVE
Interval History:   The pt is mentioning that she still had much congestion and feels swelling in submandibular region. She is getting tube feeds as well currently...    Oncology Treatment Plan:   OP BREAST DOCETAXEL Q3W    Medications:  Continuous Infusions:  Scheduled Meds:   acetaminophen  650 mg Per NG tube Q24H    amLODIPine  10 mg Per NG tube Daily    diphenhydramine (BENADRYL) IVPB  50 mg Intravenous Q24H    enoxaparin  40 mg Subcutaneous Daily    escitalopram oxalate  10 mg Per NG tube Daily    famotidine (PF)  20 mg Intravenous Q24H    folic acid  1 mg Oral Daily    guaifenesin 100 mg/5 ml  200 mg Per NG tube Q4H    Immune Globulin G (IGG)-PRO-IGA 10 % injection (Privigen)  400 mg/kg/day Intravenous Q24H    methylPREDNISolone sodium succinate  40 mg Intravenous Q12H     PRN Meds:acetaminophen, ALPRAZolam, dextrose 50%, dextrose 50%, famotidine (PF), glucagon (human recombinant), glucose, glucose, hepatitis B, hydrALAZINE, insulin aspart U-100, ondansetron, oxyCODONE, pneumoc 13-brandin conj-dip cr(PF), polyethylene glycol, promethazine, sodium chloride 0.9%, sodium chloride 0.9%     Review of Systems   Constitutional: Positive for fatigue. Negative for activity change, appetite change, chills, diaphoresis, fever and unexpected weight change.   HENT: Positive for facial swelling and trouble swallowing. Negative for congestion, ear discharge, hearing loss, postnasal drip, sinus pressure, sneezing, sore throat and tinnitus.    Eyes: Negative for photophobia, pain and redness.   Respiratory: Negative for apnea, cough, choking, chest tightness, shortness of breath and stridor.    Cardiovascular: Negative for chest pain, palpitations and leg swelling.   Gastrointestinal: Negative for abdominal pain, anal bleeding, constipation, diarrhea, nausea and rectal pain.   Endocrine: Negative for polyuria.   Genitourinary: Negative for dysuria and hematuria.   Musculoskeletal: Positive for myalgias. Negative for  arthralgias, back pain, gait problem, joint swelling, neck pain and neck stiffness.   Skin: Positive for rash. Negative for color change and pallor.   Allergic/Immunologic: Negative for immunocompromised state.   Neurological: Positive for weakness. Negative for dizziness, seizures and numbness.   Hematological: Positive for adenopathy. Does not bruise/bleed easily.   Psychiatric/Behavioral: Negative for agitation and behavioral problems.     Objective:     Vital Signs (Most Recent):  Temp: 98.6 °F (37 °C) (02/01/19 1720)  Pulse: 81 (02/01/19 1745)  Resp: 15 (02/01/19 1745)  BP: 127/68 (02/01/19 1745)  SpO2: 100 % (02/01/19 1745) Vital Signs (24h Range):  Temp:  [98.1 °F (36.7 °C)-98.7 °F (37.1 °C)] 98.6 °F (37 °C)  Pulse:  [81-97] 81  Resp:  [14-18] 15  SpO2:  [95 %-100 %] 100 %  BP: (117-142)/(62-72) 127/68     Weight: 76.2 kg (168 lb 1.6 oz)  Body mass index is 27.97 kg/m².  Body surface area is 1.87 meters squared.      Intake/Output Summary (Last 24 hours) at 2/1/2019 1804  Last data filed at 2/1/2019 1743  Gross per 24 hour   Intake 1937 ml   Output 1600 ml   Net 337 ml       Physical Exam   Constitutional: She is oriented to person, place, and time. She appears well-developed and well-nourished. No distress.   HENT:   Head: Atraumatic.   Nose: Nose normal.   Mouth/Throat: No oropharyngeal exudate (white tongue / exudate mostly resolved).   Face appears swollen   Eyes: Conjunctivae and EOM are normal. Pupils are equal, round, and reactive to light. Right eye exhibits no discharge. Left eye exhibits no discharge. No scleral icterus.   Neck: Normal range of motion. Neck supple. No tracheal deviation present.   Cardiovascular: Normal rate, regular rhythm and intact distal pulses. Exam reveals no gallop and no friction rub.   Pulmonary/Chest: Effort normal and breath sounds normal. No respiratory distress. She has no wheezes. She has no rales. She exhibits no tenderness.   Abdominal: Soft. Bowel sounds are normal.  She exhibits no distension and no mass. There is no tenderness. There is no rebound and no guarding.   Musculoskeletal: Normal range of motion. She exhibits edema (facial edema, upper extremity edema b/l). She exhibits no tenderness or deformity.   Neurological: She is alert and oriented to person, place, and time. No cranial nerve deficit or sensory deficit.     4/5 shoulder strength on shoulder abduction; improving  5/5 flexion and extension preserved at elbow   Skin: Skin is warm and dry. Capillary refill takes less than 2 seconds. No rash noted. She is not diaphoretic. No erythema. No pallor.   Psychiatric: She has a normal mood and affect.       Significant Labs:   BMP:   Recent Labs   Lab 01/31/19 0400 02/01/19 0412   * 163*   * 135*   K 3.7 4.0    101   CO2 28 29   BUN 24* 25*   CREATININE 0.6 0.6   CALCIUM 8.5* 8.5*   MG 2.3 2.1   , CBC:   Recent Labs   Lab 01/31/19 0400 02/01/19 0412   WBC 10.12 8.24   HGB 9.6* 9.2*   HCT 30.6* 29.6*    139*   , CMP:   Recent Labs   Lab 01/31/19 0400 02/01/19 0412   * 135*   K 3.7 4.0    101   CO2 28 29   * 163*   BUN 24* 25*   CREATININE 0.6 0.6   CALCIUM 8.5* 8.5*   PROT 6.9 7.1   ALBUMIN 2.7* 2.6*   BILITOT 0.5 0.4   ALKPHOS 66 69   * 100*   ALT 52* 46*   ANIONGAP 6* 5*   EGFRNONAA >60.0 >60.0   , Coagulation: No results for input(s): PT, INR, APTT in the last 48 hours., Haptoglobin: No results for input(s): HAPTOGLOBIN in the last 48 hours., Immunology: No results for input(s): SPEP, ERVIN, DARCY, FREELAMBDALI in the last 48 hours., LDH: No results for input(s): LDHCSF, BFSOURCE in the last 48 hours., LFTs:   Recent Labs   Lab 01/31/19 0400 02/01/19 0412   ALT 52* 46*   * 100*   ALKPHOS 66 69   BILITOT 0.5 0.4   PROT 6.9 7.1   ALBUMIN 2.7* 2.6*   , Reticulocytes: No results for input(s): RETIC in the last 48 hours., Tumor Markers: No results for input(s): PSA, CEA, , AFPTM, [id number],  in the last 48  hours.    Invalid input(s): ALGTM, Uric Acid No results for input(s): URICACID in the last 48 hours., Urine Studies: No results for input(s): COLORU, APPEARANCEUA, PHUR, SPECGRAV, PROTEINUA, GLUCUA, KETONESU, BILIRUBINUA, OCCULTUA, NITRITE, UROBILINOGEN, LEUKOCYTESUR, RBCUA, WBCUA, BACTERIA, SQUAMEPITHEL, HYALINECASTS in the last 48 hours.    Invalid input(s): YANET,   Recent Lab Results       02/01/19  0412        Immature Granulocytes 0.8     Immature Grans (Abs) 0.07  Comment:  Mild elevation in immature granulocytes is non specific and   can be seen in a variety of conditions including stress response,   acute inflammation, trauma and pregnancy. Correlation with other   laboratory and clinical findings is essential.       Albumin 2.6     Alkaline Phosphatase 69     ALT 46     Anion Gap 5          Baso # 0.00     Basophil% 0.0     Total Bilirubin 0.4  Comment:  For infants and newborns, interpretation of results should be based  on gestational age, weight and in agreement with clinical  observations.  Premature Infant recommended reference ranges:  Up to 24 hours.............<8.0 mg/dL  Up to 48 hours............<12.0 mg/dL  3-5 days..................<15.0 mg/dL  6-29 days.................<15.0 mg/dL       BUN, Bld 25     Calcium 8.5     Chloride 101     CO2 29          Creatinine 0.6     Differential Method Automated     eGFR if African American >60.0     eGFR if non  >60.0  Comment:  Calculation used to obtain the estimated glomerular filtration  rate (eGFR) is the CKD-EPI equation.        Eos # 0.0     Eosinophil% 0.0     Glucose 163     Gran # (ANC) 7.3     Gran% 88.4     Hematocrit 29.6     Hemoglobin 9.2     Lymph # 0.4     Lymph% 4.7     Magnesium 2.1     MCH 26.7     MCHC 31.1     MCV 86     Mono # 0.5     Mono% 6.1     MPV 11.0     nRBC 0     Phosphorus 1.8     Platelets 139     Potassium 4.0     Total Protein 7.1     RBC 3.45     RDW 18.2     Sodium 135     WBC 8.24         and All pertinent labs from the last 24 hours have been reviewed.    Diagnostic Results:  I have reviewed and interpreted all pertinent imaging results/findings within the past 24 hours.

## 2019-02-02 NOTE — SUBJECTIVE & OBJECTIVE
Interval History:   NAEO, pt still w/ irritating productive cough, possibly exacerbated by NGT but feels weakness/myopathy slowly improving. Continues NPO on tube feeds     Oncology Treatment Plan:   OP BREAST DOCETAXEL Q3W    Medications:  Continuous Infusions:  Scheduled Meds:   acetaminophen  650 mg Per NG tube Q24H    amLODIPine  10 mg Per NG tube Daily    diphenhydramine (BENADRYL) IVPB  50 mg Intravenous Q24H    enoxaparin  40 mg Subcutaneous Daily    escitalopram oxalate  10 mg Per NG tube Daily    famotidine (PF)  20 mg Intravenous Q24H    folic acid  1 mg Oral Daily    guaifenesin 100 mg/5 ml  200 mg Per NG tube Q4H    Immune Globulin G (IGG)-PRO-IGA 10 % injection (Privigen)  400 mg/kg/day Intravenous Q24H    methylPREDNISolone sodium succinate  30 mg Intravenous Q12H     PRN Meds:acetaminophen, ALPRAZolam, dextrose 50%, dextrose 50%, famotidine (PF), glucagon (human recombinant), glucose, glucose, hepatitis B, hydrALAZINE, insulin aspart U-100, ondansetron, oxyCODONE, pneumoc 13-brandin conj-dip cr(PF), polyethylene glycol, promethazine, sodium chloride 0.9%, sodium chloride 0.9%     Review of Systems   Constitutional: Positive for fatigue. Negative for activity change, appetite change, chills, diaphoresis, fever and unexpected weight change.   HENT: Positive for facial swelling and trouble swallowing. Negative for congestion, ear discharge, hearing loss, postnasal drip, sinus pressure, sneezing, sore throat and tinnitus.    Eyes: Negative for photophobia, pain and redness.   Respiratory: Negative for apnea, cough, choking, chest tightness, shortness of breath and stridor.    Cardiovascular: Negative for chest pain, palpitations and leg swelling.   Gastrointestinal: Negative for abdominal pain, anal bleeding, constipation, diarrhea, nausea and rectal pain.   Endocrine: Negative for polyuria.   Genitourinary: Negative for dysuria and hematuria.   Musculoskeletal: Positive for myalgias. Negative for  arthralgias, back pain, gait problem, joint swelling, neck pain and neck stiffness.   Skin: Positive for rash. Negative for color change and pallor.   Allergic/Immunologic: Negative for immunocompromised state.   Neurological: Positive for weakness. Negative for dizziness, seizures and numbness.   Hematological: Positive for adenopathy. Does not bruise/bleed easily.   Psychiatric/Behavioral: Negative for agitation and behavioral problems.     Objective:     Vital Signs (Most Recent):  Temp: 99 °F (37.2 °C) (02/02/19 0753)  Pulse: 98 (02/02/19 0753)  Resp: 17 (02/02/19 0753)  BP: 131/66 (02/02/19 0753)  SpO2: 100 % (02/02/19 0753) Vital Signs (24h Range):  Temp:  [98.2 °F (36.8 °C)-99 °F (37.2 °C)] 99 °F (37.2 °C)  Pulse:  [] 98  Resp:  [15-18] 17  SpO2:  [95 %-100 %] 100 %  BP: (117-152)/(62-81) 131/66     Weight: 76.1 kg (167 lb 12.3 oz)  Body mass index is 27.92 kg/m².  Body surface area is 1.87 meters squared.      Intake/Output Summary (Last 24 hours) at 2/2/2019 0900  Last data filed at 2/2/2019 0800  Gross per 24 hour   Intake 1734 ml   Output 850 ml   Net 884 ml       Physical Exam   Constitutional: She is oriented to person, place, and time. She appears well-developed and well-nourished. No distress.   HENT:   Head: Atraumatic.   Nose: Nose normal.   Mouth/Throat: No oropharyngeal exudate.   Face appears swollen   Eyes: Conjunctivae and EOM are normal. Pupils are equal, round, and reactive to light. Right eye exhibits no discharge. Left eye exhibits no discharge. No scleral icterus.   Neck: Normal range of motion. Neck supple. No tracheal deviation present.   Cardiovascular: Normal rate, regular rhythm and intact distal pulses. Exam reveals no gallop and no friction rub.   Pulmonary/Chest: Effort normal and breath sounds normal. No respiratory distress. She has no wheezes. She has no rales. She exhibits no tenderness.   Abdominal: Soft. Bowel sounds are normal. She exhibits no distension and no mass.  There is no tenderness. There is no rebound and no guarding.   Musculoskeletal: Normal range of motion. She exhibits edema (facial edema, upper extremity edema b/l, reduced from admission). She exhibits no tenderness or deformity.   Neurological: She is alert and oriented to person, place, and time. No cranial nerve deficit or sensory deficit.     4/5 shoulder strength on shoulder abduction; improving  5/5 flexion and extension preserved at elbow   Skin: Skin is warm and dry. Capillary refill takes less than 2 seconds. No rash noted. She is not diaphoretic. No erythema. No pallor.   Psychiatric: She has a normal mood and affect.   Vitals reviewed.      Significant Labs:   BMP:   Recent Labs   Lab 02/01/19 0412 02/02/19 0444   * 151*   * 135*   K 4.0 4.4    101   CO2 29 30*   BUN 25* 25*   CREATININE 0.6 0.6   CALCIUM 8.5* 8.5*   MG 2.1 2.1   , CBC:   Recent Labs   Lab 02/01/19 0412 02/02/19 0444   WBC 8.24 10.46   HGB 9.2* 8.8*   HCT 29.6* 28.3*   * 126*   , CMP:   Recent Labs   Lab 02/01/19 0412 02/02/19 0444   * 135*   K 4.0 4.4    101   CO2 29 30*   * 151*   BUN 25* 25*   CREATININE 0.6 0.6   CALCIUM 8.5* 8.5*   PROT 7.1 7.2   ALBUMIN 2.6* 2.5*   BILITOT 0.4 0.3   ALKPHOS 69 74   * 93*   ALT 46* 40   ANIONGAP 5* 4*   EGFRNONAA >60.0 >60.0   , Coagulation: No results for input(s): PT, INR, APTT in the last 48 hours., Haptoglobin: No results for input(s): HAPTOGLOBIN in the last 48 hours., Immunology: No results for input(s): SPEP, ERVIN, DARCY, FREELAMBDALI in the last 48 hours., LDH: No results for input(s): LDHCSF, BFSOURCE in the last 48 hours., LFTs:   Recent Labs   Lab 02/01/19  0412 02/02/19  0444   ALT 46* 40   * 93*   ALKPHOS 69 74   BILITOT 0.4 0.3   PROT 7.1 7.2   ALBUMIN 2.6* 2.5*   , Reticulocytes: No results for input(s): RETIC in the last 48 hours., Tumor Markers: No results for input(s): PSA, CEA, , AFPTM, [id number],  in the last 48  hours.    Invalid input(s): ALGTM, Uric Acid No results for input(s): URICACID in the last 48 hours., Urine Studies: No results for input(s): COLORU, APPEARANCEUA, PHUR, SPECGRAV, PROTEINUA, GLUCUA, KETONESU, BILIRUBINUA, OCCULTUA, NITRITE, UROBILINOGEN, LEUKOCYTESUR, RBCUA, WBCUA, BACTERIA, SQUAMEPITHEL, HYALINECASTS in the last 48 hours.    Invalid input(s): YANET,   Recent Lab Results       02/02/19  0444        Immature Granulocytes 0.8     Immature Grans (Abs) 0.08  Comment:  Mild elevation in immature granulocytes is non specific and   can be seen in a variety of conditions including stress response,   acute inflammation, trauma and pregnancy. Correlation with other   laboratory and clinical findings is essential.       Albumin 2.5     Alkaline Phosphatase 74     ALT 40     Anion Gap 4     AST 93     Baso # 0.00     Basophil% 0.0     Total Bilirubin 0.3  Comment:  For infants and newborns, interpretation of results should be based  on gestational age, weight and in agreement with clinical  observations.  Premature Infant recommended reference ranges:  Up to 24 hours.............<8.0 mg/dL  Up to 48 hours............<12.0 mg/dL  3-5 days..................<15.0 mg/dL  6-29 days.................<15.0 mg/dL       BUN, Bld 25     Calcium 8.5     Chloride 101     CO2 30          Creatinine 0.6     Differential Method Automated     eGFR if African American >60.0     eGFR if non  >60.0  Comment:  Calculation used to obtain the estimated glomerular filtration  rate (eGFR) is the CKD-EPI equation.        Eos # 0.0     Eosinophil% 0.0     Glucose 151     Gran # (ANC) 9.1     Gran% 86.6     Hematocrit 28.3     Hemoglobin 8.8     Lymph # 0.6     Lymph% 5.9     Magnesium 2.1     MCH 27.4     MCHC 31.1     MCV 88     Mono # 0.7     Mono% 6.7     MPV 9.9     nRBC 0     Phosphorus 2.3     Platelets 126     Potassium 4.4     Total Protein 7.2     RBC 3.21     RDW 18.5     Sodium 135     WBC 10.46         and All pertinent labs from the last 24 hours have been reviewed.    Diagnostic Results:  I have reviewed and interpreted all pertinent imaging results/findings within the past 24 hours.

## 2019-02-02 NOTE — SUBJECTIVE & OBJECTIVE
Interval History: Patient seen at bedside. Strength improving as well as swallowing. Pending repeat swallow eval on Monday.  On day 5 of IVIG (last day). Remains NPO. No new rashes. No trouble breathing, f/c, n/v/d. Started on vaccinations.     Current Facility-Administered Medications   Medication Frequency    acetaminophen tablet 650 mg Q4H PRN    acetaminophen tablet 650 mg Q24H    ALPRAZolam tablet 0.25 mg TID PRN    amLODIPine tablet 10 mg Daily    dextrose 50% injection 12.5 g PRN    dextrose 50% injection 25 g PRN    diphenhydrAMINE (BENADRYL) 50 mg in sodium chloride 0.9% 50 mL IVPB Q24H    enoxaparin injection 40 mg Daily    escitalopram oxalate tablet 10 mg Daily    famotidine (PF) injection 20 mg PRN    famotidine (PF) injection 20 mg Q24H    folic acid tablet 1 mg Daily    glucagon (human recombinant) injection 1 mg PRN    glucose chewable tablet 16 g PRN    glucose chewable tablet 24 g PRN    guaifenesin 100 mg/5 ml syrup 200 mg Q4H    hepatitis B (HEPLISAV-B) 20 mcg/0.5 mL vaccine 0.5 mL vaccine x 1 dose    hydrALAZINE injection 10 mg Q6H PRN    Immune Globulin G (IGG)-PRO-IGA 10 % injection (Privigen) 10 % injection 30 g Q24H    insulin aspart U-100 pen 0-5 Units QID (AC + HS) PRN    methylPREDNISolone sodium succinate injection 30 mg Q12H    ondansetron disintegrating tablet 8 mg Q8H PRN    oxyCODONE immediate release tablet 5 mg Q6H PRN    pneumoc 13-brandin conj-dip cr(PF) (PREVNAR 13 (PF)) 0.5 mL vaccine x 1 dose    polyethylene glycol packet 17 g BID PRN    promethazine tablet 12.5 mg Q6H PRN    sodium chloride 0.9% flush 10 mL PRN    sodium chloride 0.9% flush 5 mL PRN     Objective:     Vital Signs (Most Recent):  Temp: 99 °F (37.2 °C) (02/02/19 0753)  Pulse: 98 (02/02/19 0753)  Resp: 17 (02/02/19 0753)  BP: 131/66 (02/02/19 0753)  SpO2: 100 % (02/02/19 0753)  O2 Device (Oxygen Therapy): room air (02/02/19 0753) Vital Signs (24h Range):  Temp:  [98.2 °F (36.8 °C)-99 °F  (37.2 °C)] 99 °F (37.2 °C)  Pulse:  [] 98  Resp:  [15-18] 17  SpO2:  [95 %-100 %] 100 %  BP: (117-152)/(62-81) 131/66     Weight: 76.1 kg (167 lb 12.3 oz) (02/02/19 0400)  Body mass index is 27.92 kg/m².  Body surface area is 1.87 meters squared.      Intake/Output Summary (Last 24 hours) at 2/2/2019 0936  Last data filed at 2/2/2019 0800  Gross per 24 hour   Intake 1734 ml   Output 850 ml   Net 884 ml       Physical Exam   Constitutional: She is oriented to person, place, and time and well-developed, well-nourished, and in no distress. No distress.   HENT:   Head: Normocephalic and atraumatic.   Right Ear: External ear normal.   Left Ear: External ear normal.   Bilateral orbital edema with heliotropic rash - resolved   Eyes: Conjunctivae and EOM are normal. Pupils are equal, round, and reactive to light.   Neck: Normal range of motion. Neck supple.   Cardiovascular: Normal rate, regular rhythm, normal heart sounds and intact distal pulses.    Pulmonary/Chest: Effort normal and breath sounds normal. No respiratory distress.   Abdominal: Soft. Bowel sounds are normal.       Right Side Rheumatological Exam     Muscle Strength (0-5 scale):  Neck Flexion:  4.8  Neck Extension: 5  Deltoid:  4.2  Biceps: 5/5   Triceps:  4.6  : 5/5   Iliopsoas: 4.6  Quadriceps:  5   Distal Lower Extremity: 5    Left Side Rheumatological Exam     Muscle Strength (0-5 scale):  Neck Flexion:  4.8  Neck Extension: 5  Deltoid:  4.2  Biceps: 5/5   Triceps:  4.6  :  5/5   Iliopsoas: 4.6  Quadriceps:  5   Distal Lower Extremity: 5      Neurological: She is alert and oriented to person, place, and time. GCS score is 15.   Skin: Skin is warm and dry. No rash noted.     Hyperpigmented non raised rash over nape of neck, elbows, knuckles (resemble Gottron's papules), thighs, and ankles -resolving       Psychiatric: Mood, memory, affect and judgment normal.   Musculoskeletal: Normal range of motion. She exhibits edema. She exhibits no  tenderness or deformity.         Significant Labs:  Recent Results (from the past 48 hour(s))   Comprehensive Metabolic Panel (CMP)    Collection Time: 02/01/19  4:12 AM   Result Value Ref Range    Sodium 135 (L) 136 - 145 mmol/L    Potassium 4.0 3.5 - 5.1 mmol/L    Chloride 101 95 - 110 mmol/L    CO2 29 23 - 29 mmol/L    Glucose 163 (H) 70 - 110 mg/dL    BUN, Bld 25 (H) 6 - 20 mg/dL    Creatinine 0.6 0.5 - 1.4 mg/dL    Calcium 8.5 (L) 8.7 - 10.5 mg/dL    Total Protein 7.1 6.0 - 8.4 g/dL    Albumin 2.6 (L) 3.5 - 5.2 g/dL    Total Bilirubin 0.4 0.1 - 1.0 mg/dL    Alkaline Phosphatase 69 55 - 135 U/L     (H) 10 - 40 U/L    ALT 46 (H) 10 - 44 U/L    Anion Gap 5 (L) 8 - 16 mmol/L    eGFR if African American >60.0 >60 mL/min/1.73 m^2    eGFR if non African American >60.0 >60 mL/min/1.73 m^2   Magnesium    Collection Time: 02/01/19  4:12 AM   Result Value Ref Range    Magnesium 2.1 1.6 - 2.6 mg/dL   Phosphorus    Collection Time: 02/01/19  4:12 AM   Result Value Ref Range    Phosphorus 1.8 (L) 2.7 - 4.5 mg/dL   CK    Collection Time: 02/01/19  4:12 AM   Result Value Ref Range     (H) 20 - 180 U/L   CBC auto differential    Collection Time: 02/01/19  4:12 AM   Result Value Ref Range    WBC 8.24 3.90 - 12.70 K/uL    RBC 3.45 (L) 4.00 - 5.40 M/uL    Hemoglobin 9.2 (L) 12.0 - 16.0 g/dL    Hematocrit 29.6 (L) 37.0 - 48.5 %    MCV 86 82 - 98 fL    MCH 26.7 (L) 27.0 - 31.0 pg    MCHC 31.1 (L) 32.0 - 36.0 g/dL    RDW 18.2 (H) 11.5 - 14.5 %    Platelets 139 (L) 150 - 350 K/uL    MPV 11.0 9.2 - 12.9 fL    Immature Granulocytes 0.8 (H) 0.0 - 0.5 %    Gran # (ANC) 7.3 1.8 - 7.7 K/uL    Immature Grans (Abs) 0.07 (H) 0.00 - 0.04 K/uL    Lymph # 0.4 (L) 1.0 - 4.8 K/uL    Mono # 0.5 0.3 - 1.0 K/uL    Eos # 0.0 0.0 - 0.5 K/uL    Baso # 0.00 0.00 - 0.20 K/uL    nRBC 0 0 /100 WBC    Gran% 88.4 (H) 38.0 - 73.0 %    Lymph% 4.7 (L) 18.0 - 48.0 %    Mono% 6.1 4.0 - 15.0 %    Eosinophil% 0.0 0.0 - 8.0 %    Basophil% 0.0 0.0 - 1.9 %     Differential Method Automated    Comprehensive Metabolic Panel (CMP)    Collection Time: 02/02/19  4:44 AM   Result Value Ref Range    Sodium 135 (L) 136 - 145 mmol/L    Potassium 4.4 3.5 - 5.1 mmol/L    Chloride 101 95 - 110 mmol/L    CO2 30 (H) 23 - 29 mmol/L    Glucose 151 (H) 70 - 110 mg/dL    BUN, Bld 25 (H) 6 - 20 mg/dL    Creatinine 0.6 0.5 - 1.4 mg/dL    Calcium 8.5 (L) 8.7 - 10.5 mg/dL    Total Protein 7.2 6.0 - 8.4 g/dL    Albumin 2.5 (L) 3.5 - 5.2 g/dL    Total Bilirubin 0.3 0.1 - 1.0 mg/dL    Alkaline Phosphatase 74 55 - 135 U/L    AST 93 (H) 10 - 40 U/L    ALT 40 10 - 44 U/L    Anion Gap 4 (L) 8 - 16 mmol/L    eGFR if African American >60.0 >60 mL/min/1.73 m^2    eGFR if non African American >60.0 >60 mL/min/1.73 m^2   Magnesium    Collection Time: 02/02/19  4:44 AM   Result Value Ref Range    Magnesium 2.1 1.6 - 2.6 mg/dL   Phosphorus    Collection Time: 02/02/19  4:44 AM   Result Value Ref Range    Phosphorus 2.3 (L) 2.7 - 4.5 mg/dL   CK    Collection Time: 02/02/19  4:44 AM   Result Value Ref Range     (H) 20 - 180 U/L   CBC auto differential    Collection Time: 02/02/19  4:44 AM   Result Value Ref Range    WBC 10.46 3.90 - 12.70 K/uL    RBC 3.21 (L) 4.00 - 5.40 M/uL    Hemoglobin 8.8 (L) 12.0 - 16.0 g/dL    Hematocrit 28.3 (L) 37.0 - 48.5 %    MCV 88 82 - 98 fL    MCH 27.4 27.0 - 31.0 pg    MCHC 31.1 (L) 32.0 - 36.0 g/dL    RDW 18.5 (H) 11.5 - 14.5 %    Platelets 126 (L) 150 - 350 K/uL    MPV 9.9 9.2 - 12.9 fL    Immature Granulocytes 0.8 (H) 0.0 - 0.5 %    Gran # (ANC) 9.1 (H) 1.8 - 7.7 K/uL    Immature Grans (Abs) 0.08 (H) 0.00 - 0.04 K/uL    Lymph # 0.6 (L) 1.0 - 4.8 K/uL    Mono # 0.7 0.3 - 1.0 K/uL    Eos # 0.0 0.0 - 0.5 K/uL    Baso # 0.00 0.00 - 0.20 K/uL    nRBC 0 0 /100 WBC    Gran% 86.6 (H) 38.0 - 73.0 %    Lymph% 5.9 (L) 18.0 - 48.0 %    Mono% 6.7 4.0 - 15.0 %    Eosinophil% 0.0 0.0 - 8.0 %    Basophil% 0.0 0.0 - 1.9 %    Differential Method Automated      Results for [facility],  ROMEO WILLIAMSON (MRN [id number]) as of 2/2/2019 09:37   Ref. Range 1/29/2019 05:00 1/30/2019 05:45 1/31/2019 04:00 2/1/2019 04:12 2/2/2019 04:44   CPK Latest Ref Range: 20 - 180 U/L 1789 (H) 1424 (H) 1147 (H) 827 (H) 662 (H)     Results for ROMEO PEREZ (MRN [id number]) as of 1/28/2019 10:23   Ref. Range 1/24/2019 03:40 1/25/2019 04:20 1/26/2019 04:00 1/27/2019 04:21 1/28/2019 04:00   Aldolase Latest Ref Range: 1.2 - 7.6 U/L 12.7 (H) 14.2 (H) 13.7 (H) 13.1 (H) 15.4 (H)   Results for ROMEO PEREZ (MRN [id number]) as of 1/28/2019 10:23   Ref. Range 1/22/2019 22:24   Sed Rate Latest Ref Range: 0 - 36 mm/Hr 50 (H)     Results for ROMEO PEREZ (MRN [id number]) as of 1/28/2019 10:23   Ref. Range 1/22/2019 22:24   CRP Latest Ref Range: 0.0 - 8.2 mg/L 31.1 (H)      Results for ROMEO PEREZ (MRN [id number]) as of 1/28/2019 10:23   Ref. Range 1/22/2019 22:24 1/25/2019 17:33   DARCY HEP-2 Titer Unknown Positive 1:640 Sp...    Anti-SSA Antibody Latest Ref Range: 0.00 - 19.99 EU 0.49    Anti-SSA Interpretation Latest Ref Range: Negative  Negative    Anti-SSB Antibody Latest Ref Range: 0.00 - 19.99 EU 0.11    Anti-SSB Interpretation Latest Ref Range: Negative  Negative    ds DNA Ab Latest Ref Range: Negative 1:10  Negative 1:10    Anti Sm Antibody Latest Ref Range: 0.00 - 19.99 EU 0.58    Anti-Sm Interpretation Latest Ref Range: Negative  Negative    Anti Sm/RNP Antibody Latest Ref Range: 0.00 - 19.99 EU 0.62    Anti-Sm/RNP Interpretation Latest Ref Range: Negative  Negative    DARCY Screen Latest Ref Range: Negative <1:160  Positive (A)    Complement (C-3) Latest Ref Range: 50 - 180 mg/dL  106   Complement (C-4) Latest Ref Range: 11 - 44 mg/dL  29   Meg-1 Autoantibodies Latest Ref Range: <1.0 Index <1.00    Results for ROMEO PEREZ (MRN [id number]) as of 1/28/2019 10:23   Ref. Range 1/23/2019 02:55 1/23/2019 02:56   Specimen UA Unknown Urine, Clean Catch    Color, UA Latest Ref Range: Yellow, Straw, [place]  Colorless (A)    pH,  UA Latest Ref Range: 5.0 - 8.0  6.0    Specific Gravity, UA Latest Ref Range: 1.005 - 1.030  1.005    Appearance, UA Latest Ref Range: Clear  Clear    Protein, UA Latest Ref Range: Negative  Negative    Glucose, UA Latest Ref Range: Negative  Negative    Ketones, UA Latest Ref Range: Negative  Negative    Occult Blood UA Latest Ref Range: Negative  Negative    Nitrite, UA Latest Ref Range: Negative  Negative    Bilirubin (UA) Latest Ref Range: Negative  Negative    Leukocytes, UA Latest Ref Range: Negative  Trace (A)    RBC, UA Latest Ref Range: 0 - 4 /hpf  2   WBC, UA Latest Ref Range: 0 - 5 /hpf  3   Squam Epithel, UA Latest Units: /hpf  0   Microscopic Comment Unknown  SEE COMMENT     Meg-1: Negative    Significant Imaging:  MRI Humerus w/ and w/o contrast:  Impression       1. Diffuse edema and enhancement of the visualized left upper extremity musculature, most pronounced proximally, most notably of the deltoid and biceps musculature as detailed above.  Findings are nonspecific although could relate to a nonspecific myelitis particularly in light of patient's reported history.  Clinical correlation with appropriate history and lab markers advised.  2. No evidence of abscess or osteomyelitis.  This report was flagged in Epic as abnormal.     NM PET CT 12/27/18:  Impression       See above    Significant improvement in all the previously seen lymph nodes involving the left lower neck, supraclavicular region, axillary and retropectoral region.    Index left retropectoral SUV max 3.57, previously 27.2.     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 Biopsied.                        - Small hiatal hernia.                        - Normal stomach.                        - Normal examined duodenum.  Recommendation:       - Return patient to hospital to for ongoing care.                        - Await  pathology results.                        - Use a proton pump inhibitor PO BID.                        - The findings and recommendations were discussed                         with the designated responsible adult.                        - Repeat upper endoscopy in 8 weeks to check                         healing.

## 2019-02-02 NOTE — ASSESSMENT & PLAN NOTE
Oral thrush  -cont Nystatin swish and swallow. Additionally, added fluconaozle 200 mg Po qday on 01/26 as possibility patient is experiencing esophagitis 2/2 to candida  -appears resolved now s/p fluconazole and nystatin

## 2019-02-02 NOTE — ASSESSMENT & PLAN NOTE
-On January 24, 2017 a core needle biopsy was performed which showed infiltrating ductal carcinoma, high grade.  The tumor was ER negative, MI negative, and HER-2 negative.  -She had 4 cycles of  Wilbur-adjuvantTaxotere and Cytoxan completed  on 5/9/17.  -On June 26 she underwent left mastectomy.  That revealed 2 foci of invasive high-grade carcinoma measuring 14 mm and 1.5 mm.  Margins were negative.  -She completed 4 cycles of adjuvant Adriamycin on September 12, 2017.  -  A fine-needle aspirate of the lymph node was performed on October 19th.  That showed metastatic carcinoma consistent with breast primary which was ER negative, MI negative and HER 2-negative.    -Per rheumatology: hold paclitaxel agent and atezolizumb at this time - both can cause myositis

## 2019-02-02 NOTE — ASSESSMENT & PLAN NOTE
57yo F with history of L sided infiltrating ductal carcinoma of the L breast (dx on Jan 2017), HTN, depression, and gastritis was send from hem/onc clinic for evaluation of possible inflammatory myositis.     Patient started on Atelizumab/Abraxane on 11/7/18. Per chart review, patient noticed rashes on the dorsum of her hands on 11/11/18. Seen in urgent care and given topical steroid cream. Then received two more infusions on Atelizumab/Abraxane on 11/21/18 and 12/5/18. Started to notice puffiness around the eyes on 12/11/18. Received another Abraxane infusion on 12/12/18 but this time with hydrocortisone 50 mg which helped reduce the swelling around the eyes. Developed swelling of the face again on 12/15/18. Next infusion of Abraxane done on 12/19/18 with Solucortef and Atelizumab held. Abraxane given again on 1/3/19 with no IV steroid. Patient developed swelling of the face on 1/4/19 and went to urgent care and given short course of prednisone 20 mg BID. On 1/15/19, patient seen in hem/onc clinic with c/o of pressure and tightness around neck. CT scan of chest and neck did not reveal any vascular compression. Started on prednisone 60 mg with taper. On 1/22/18 patient with c/o proximal muscle weakness. CPK found to be elevated around 4k. Patient admitted and given solumedrol 80 mg IV on 1/22/18. Last infusion of Atelizumab on 12/19/18. Last infusion of Abraxane on 1/3/19. Given Solumedrol 1g x 1 on 1/23/18. Seen by Dermatology on 1/24/18 and biopsy done of skin rash. Given distribution of rashes, there was concern for dermatomyositis. Patient started on Solumedrol 125 mg IV BID at this time.      Labs: ESR 50, CRP 31.1, CPK 4562 (trending down), Aldolase 13.6.  , ALT 64.  UA normal.  CBC unremarkable.  +DARCY 1:640 speckled with negative profile.  Complements normal. Normal GGT. RPR, HIV negative. Hep B/C negative.  Strongyloides negative.    Spirometry: normal, normal diffusion capacity. FVC: 81%, DLCO:  "114%    Case reports of docetaxel related inflammatory myositis had been documented. "...taxanes have been noted to cause disabling but transient arthralgia and myalgias; it is important to consider the possibility of inflammatory myopathy as a possible complication in patients undergoing treatment with these agents." - A case of docetaxel induced myositis and review of literature. Austin et al 2015.    Case reports in Rheumatology   Case reports of atezolizumb (immunomodulator) cause of myositis  - Lucy et al 2017 "Myositis as an adverse even of immune checkpoint blockade for cancer therapy.  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   Dermatomyositis can be associated with malignancy.  MRI of LUE showed edema of the deltoid and biceps.  Exam with proximal muscle weakness: b/l deltoids 4/5, b/l iliopsoas 4.4/5. 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      Inpatient treatments so far:  - Solumedrol 80 mg IV x 1 on 1/22/19  - Solumedrol 1 g IV x 1 on 1/23/19.  - Solumedrol 125 mg IV BID since 1/24/19 -->being tapered down.  - IVIG 1/29/19-2/2     Plan:  - chemotherapy can be re-started as it may help treat Dermatomyositis. Would recommend against checkpoint inhibitor, however, as it may have been a trigger for the dermatomyositis  - continue to monitor CPK, aldolase, AST/ALT  - Change Solumedrol to 60 mg IV daily x 2 days then decrease to 50 mg IV. When able to swallow, change to prednisone 60 mg daily until f/u with Rheum.  - continue IVIg 400 mg/kg daily x 5 days - last day today   - MTX 15 mg sc weekly (Tuesdays) with daily folic acid.   - recommend ID fast track consult for vaccinations.  - f/u " Quant TB  - f/u esophageal biopsy results  - start physical therapy.   - f/u myomarker panel and HMGCR   - PFTs with lung volumes as outpatient.   - f/u with Dr. Swift and Dr. Campos in Rheumatology on 2/15/19 at 2PM

## 2019-02-03 LAB
ALBUMIN SERPL BCP-MCNC: 2.3 G/DL
ALP SERPL-CCNC: 71 U/L
ALT SERPL W/O P-5'-P-CCNC: 36 U/L
ANION GAP SERPL CALC-SCNC: 4 MMOL/L
AST SERPL-CCNC: 86 U/L
BASOPHILS # BLD AUTO: 0 K/UL
BASOPHILS NFR BLD: 0 %
BILIRUB SERPL-MCNC: 0.3 MG/DL
BUN SERPL-MCNC: 23 MG/DL
CALCIUM SERPL-MCNC: 8.4 MG/DL
CHLORIDE SERPL-SCNC: 100 MMOL/L
CK SERPL-CCNC: 517 U/L
CO2 SERPL-SCNC: 28 MMOL/L
CREAT SERPL-MCNC: 0.6 MG/DL
DIFFERENTIAL METHOD: ABNORMAL
EOSINOPHIL # BLD AUTO: 0 K/UL
EOSINOPHIL NFR BLD: 0 %
ERYTHROCYTE [DISTWIDTH] IN BLOOD BY AUTOMATED COUNT: 18.6 %
EST. GFR  (AFRICAN AMERICAN): >60 ML/MIN/1.73 M^2
EST. GFR  (NON AFRICAN AMERICAN): >60 ML/MIN/1.73 M^2
GLUCOSE SERPL-MCNC: 142 MG/DL
HCT VFR BLD AUTO: 25.9 %
HGB BLD-MCNC: 8.3 G/DL
IMM GRANULOCYTES # BLD AUTO: 0.07 K/UL
IMM GRANULOCYTES NFR BLD AUTO: 0.7 %
LYMPHOCYTES # BLD AUTO: 0.5 K/UL
LYMPHOCYTES NFR BLD: 5 %
MAGNESIUM SERPL-MCNC: 1.9 MG/DL
MCH RBC QN AUTO: 27.5 PG
MCHC RBC AUTO-ENTMCNC: 32 G/DL
MCV RBC AUTO: 86 FL
MONOCYTES # BLD AUTO: 0.6 K/UL
MONOCYTES NFR BLD: 6 %
NEUTROPHILS # BLD AUTO: 8.8 K/UL
NEUTROPHILS NFR BLD: 88.3 %
NRBC BLD-RTO: 0 /100 WBC
PHOSPHATE SERPL-MCNC: 2.4 MG/DL
PLATELET # BLD AUTO: 116 K/UL
PMV BLD AUTO: 9.6 FL
POTASSIUM SERPL-SCNC: 4.5 MMOL/L
PROT SERPL-MCNC: 7.1 G/DL
RBC # BLD AUTO: 3.02 M/UL
SODIUM SERPL-SCNC: 132 MMOL/L
WBC # BLD AUTO: 9.92 K/UL

## 2019-02-03 PROCEDURE — 99231 PR SUBSEQUENT HOSPITAL CARE,LEVL I: ICD-10-PCS | Mod: ,,, | Performed by: INTERNAL MEDICINE

## 2019-02-03 PROCEDURE — 63600175 PHARM REV CODE 636 W HCPCS: Performed by: INTERNAL MEDICINE

## 2019-02-03 PROCEDURE — C9113 INJ PANTOPRAZOLE SODIUM, VIA: HCPCS | Performed by: STUDENT IN AN ORGANIZED HEALTH CARE EDUCATION/TRAINING PROGRAM

## 2019-02-03 PROCEDURE — 84100 ASSAY OF PHOSPHORUS: CPT

## 2019-02-03 PROCEDURE — 25000003 PHARM REV CODE 250: Performed by: STUDENT IN AN ORGANIZED HEALTH CARE EDUCATION/TRAINING PROGRAM

## 2019-02-03 PROCEDURE — 63600175 PHARM REV CODE 636 W HCPCS: Performed by: STUDENT IN AN ORGANIZED HEALTH CARE EDUCATION/TRAINING PROGRAM

## 2019-02-03 PROCEDURE — 99231 SBSQ HOSP IP/OBS SF/LOW 25: CPT | Mod: ,,, | Performed by: INTERNAL MEDICINE

## 2019-02-03 PROCEDURE — 83735 ASSAY OF MAGNESIUM: CPT

## 2019-02-03 PROCEDURE — 85025 COMPLETE CBC W/AUTO DIFF WBC: CPT

## 2019-02-03 PROCEDURE — 82550 ASSAY OF CK (CPK): CPT

## 2019-02-03 PROCEDURE — 99233 PR SUBSEQUENT HOSPITAL CARE,LEVL III: ICD-10-PCS | Mod: ,,, | Performed by: INTERNAL MEDICINE

## 2019-02-03 PROCEDURE — 82085 ASSAY OF ALDOLASE: CPT

## 2019-02-03 PROCEDURE — 20600001 HC STEP DOWN PRIVATE ROOM

## 2019-02-03 PROCEDURE — 99233 SBSQ HOSP IP/OBS HIGH 50: CPT | Mod: ,,, | Performed by: INTERNAL MEDICINE

## 2019-02-03 PROCEDURE — 80053 COMPREHEN METABOLIC PANEL: CPT

## 2019-02-03 RX ORDER — FERROUS SULFATE, DRIED 160(50) MG
1 TABLET, EXTENDED RELEASE ORAL 2 TIMES DAILY
Status: DISCONTINUED | OUTPATIENT
Start: 2019-02-03 | End: 2019-02-08

## 2019-02-03 RX ADMIN — GUAIFENESIN 200 MG: 200 SOLUTION ORAL at 05:02

## 2019-02-03 RX ADMIN — GUAIFENESIN 200 MG: 200 SOLUTION ORAL at 02:02

## 2019-02-03 RX ADMIN — GUAIFENESIN 200 MG: 200 SOLUTION ORAL at 09:02

## 2019-02-03 RX ADMIN — DIPHENHYDRAMINE HYDROCHLORIDE 50 MG: 50 INJECTION, SOLUTION INTRAMUSCULAR; INTRAVENOUS at 04:02

## 2019-02-03 RX ADMIN — PANTOPRAZOLE SODIUM 40 MG: 40 INJECTION, POWDER, FOR SOLUTION INTRAVENOUS at 09:02

## 2019-02-03 RX ADMIN — OYSTER SHELL CALCIUM WITH VITAMIN D 1 TABLET: 500; 200 TABLET, FILM COATED ORAL at 09:02

## 2019-02-03 RX ADMIN — GUAIFENESIN 200 MG: 200 SOLUTION ORAL at 04:02

## 2019-02-03 RX ADMIN — AMLODIPINE BESYLATE 10 MG: 10 TABLET ORAL at 09:02

## 2019-02-03 RX ADMIN — ESCITALOPRAM OXALATE 10 MG: 5 TABLET, FILM COATED ORAL at 09:02

## 2019-02-03 RX ADMIN — ENOXAPARIN SODIUM 40 MG: 100 INJECTION SUBCUTANEOUS at 04:02

## 2019-02-03 RX ADMIN — METHYLPREDNISOLONE SODIUM SUCCINATE 24 MG: 125 INJECTION, POWDER, FOR SOLUTION INTRAMUSCULAR; INTRAVENOUS at 09:02

## 2019-02-03 RX ADMIN — FOLIC ACID 1 MG: 1 TABLET ORAL at 09:02

## 2019-02-03 NOTE — ASSESSMENT & PLAN NOTE
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   - F/u 1/24 skin biopsy. Pending results may need muscle biopsy by general surgery  -MRI left humerus suspicious for myositis    Per rheumatology  - Methylpred 125mg IV bid started, now tapered to 24mg IV bid  - Starting MTX 15mg subQ qWeekly  - IVIG x5 days completed 2/3/19 will require 1x maintenance dose 4 weeks from now per rheum  - myomarker panel, HMGCR, QG-TB pending  - PFTs as OP  - Quantitative IgA 533, IgG 932, IgM 66  - ID consulted for fast track vacc  - f/u with Dr. Swift and Dr. Campos in Rheumatology at discharge  - Currently tapering steroids per rheumatology, transition to PO medrol once swallow study passed    Lab Results   Component Value Date     (H) 02/03/2019     (H) 02/02/2019     (H) 02/01/2019    CPK 1147 (H) 01/31/2019    CPK 1424 (H) 01/30/2019

## 2019-02-03 NOTE — PT/OT/SLP PROGRESS
Speech Language Pathology      Ermelinda Verde  MRN: [id number]    New orders received and acknowledged.  Pt already being followed by speech therapy service and treatment will resume as regularly scheduled 02/04/19. Please see previously documented notes for additional information and plan re:clinical concerns for swallow function and timeline for reassessment of swallow function.       Kinsey Sorto, CCC-SLP

## 2019-02-03 NOTE — ASSESSMENT & PLAN NOTE
59yo F with history of L sided infiltrating ductal carcinoma of the L breast (dx on Jan 2017), HTN, depression, and gastritis was send from hem/onc clinic for evaluation of possible inflammatory myositis.     Patient started on Atelizumab/Abraxane on 11/7/18. Per chart review, patient noticed rashes on the dorsum of her hands on 11/11/18. Seen in urgent care and given topical steroid cream. Then received two more infusions on Atelizumab/Abraxane on 11/21/18 and 12/5/18. Started to notice puffiness around the eyes on 12/11/18. Received another Abraxane infusion on 12/12/18 but this time with hydrocortisone 50 mg which helped reduce the swelling around the eyes. Developed swelling of the face again on 12/15/18. Next infusion of Abraxane done on 12/19/18 with Solucortef and Atelizumab held. Abraxane given again on 1/3/19 with no IV steroid. Patient developed swelling of the face on 1/4/19 and went to urgent care and given short course of prednisone 20 mg BID. On 1/15/19, patient seen in hem/onc clinic with c/o of pressure and tightness around neck. CT scan of chest and neck did not reveal any vascular compression. Started on prednisone 60 mg with taper. On 1/22/18 patient with c/o proximal muscle weakness. CPK found to be elevated around 4k. Patient admitted and given solumedrol 80 mg IV on 1/22/18. Last infusion of Atelizumab on 12/19/18. Last infusion of Abraxane on 1/3/19. Given Solumedrol 1g x 1 on 1/23/18. Seen by Dermatology on 1/24/18 and biopsy done of skin rash. Given distribution of rashes, there was concern for dermatomyositis. Patient started on Solumedrol 125 mg IV BID at this time.      Labs: ESR 50, CRP 31.1, CPK 4562 (trending down), Aldolase 13.6.  , ALT 64.  UA normal.  CBC unremarkable.  +DARCY 1:640 speckled with negative profile.  Complements normal. Normal GGT. RPR, HIV negative. Hep B/C negative.  Strongyloides negative. HMGCR antibody negative.    Spirometry: normal, normal diffusion  "capacity. FVC: 81%, DLCO: 114%    Case reports of docetaxel related inflammatory myositis had been documented. "...taxanes have been noted to cause disabling but transient arthralgia and myalgias; it is important to consider the possibility of inflammatory myopathy as a possible complication in patients undergoing treatment with these agents." - A case of docetaxel induced myositis and review of literature. Austin et al 2015.    Case reports in Rheumatology   Case reports of atezolizumb (immunomodulator) cause of myositis  - Lucy et al 2017 "Myositis as an adverse even of immune checkpoint blockade for cancer therapy.  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   Dermatomyositis can be associated with malignancy.  MRI of LUE showed edema of the deltoid and biceps.  Exam with proximal muscle weakness: b/l deltoids 4/5, b/l iliopsoas 4.4/5. 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     Inpatient treatments so far:  - Solumedrol 80 mg IV x 1 on 1/22/19  - Solumedrol 1 g IV x 1 on 1/23/19.  - Solumedrol 125 mg IV BID since 1/24/19 -->being tapered down.  - IVIG 1/29/19-2/2     Plan:  - chemotherapy can be re-started as it may help treat Dermatomyositis. Would recommend against checkpoint inhibitor, however, as it may have been a trigger for the dermatomyositis  - continue to monitor CPK, aldolase, AST/ALT  - Change Solumedrol to 24 mg IV q 12h. Once swallowing re-evaluated can change to Medrol 48 mg oral daily.  - completed IVIg 400 mg/kg daily x 5 days   - MTX 15 mg sc weekly (Tuesdays) with daily folic acid.   - recommend ID fast track consult for vaccinations.  - f/u Quant TB  - f/u esophageal biopsy " results  - PT-->Rehab  - f/u myomarker panel   - PFTs with lung volumes as outpatient.   - 1200 mg dietary calcium and Vitamin D3 1000 mg daily  - continue H2 blocker  - f/u with Dr. Swift and Dr. Campos in Rheumatology on 2/15/19 at 2PM

## 2019-02-03 NOTE — PLAN OF CARE
Problem: Adult Inpatient Plan of Care  Goal: Plan of Care Review  Outcome: Ongoing (interventions implemented as appropriate)  Pt involved in plan of care and communicating needs throughout shift. Family at bedside and involved in plan of care.  Pt up in room with SBA; generalized weakness noted but weakness improving daily.  No c/o pain or discomfort today.  Pt remaining NPO with ice chips and tube feeds; isosource infusing to NGT as ordered at goal rate of 65ml/hr; pt tolerating well with no residuals today.  Pt is voiding without difficulty; last BM yesterday.  Tapering IV steroids per rheumatology orders.  Last dose of IVIG infusing this afternoon; pt tolerating well without complication.  All VSS; no acute events so far this shift.  Pt remaining free from falls or injury throughout shift; bed in lowest position; call light within reach.  Pt instructed to call for assistance as needed.  Q1H rounding done on pt.

## 2019-02-03 NOTE — PROGRESS NOTES
IVIG infusing as ordered at this time; dose #5/5.  Pt premedicated with tylenol, pepcid and benadryl prior to start of infusion.  R CW portacath flushed with normal saline prior to start of infusion with good blood return noted.  IVIG infusion started at 0.5mg/kg/hr as ordered; will increase rate as ordered per pt tolerance.  Frequent VS initiated with infusion.  Pt tolerating well so far; no distress noted.  Will continue to monitor.

## 2019-02-03 NOTE — ASSESSMENT & PLAN NOTE
Patient is experiencing difficulty swallowing that has been increasing in severity over last couple of days to the point where patient did not feel as if she could swallow.  As of 01/26, SLP evaluated patient and determined that she should be NPO except for medications until a modified barium swallow could be performed. Possibly candidal esophagitis; patient has oral thrush.    Plan:  - High aspiration risk on modified barium study, strict NPO  - NGT in situ, tube feeds initiated per dietary consult  - Switched to IV meds where possible  - Fluconazole IVPB 200mg q24hr  - GI consulted, EGD pending today to r/o candidal esophagitis vs dermatomyositis motility dysfunction --> resulted w/ Grade C erosive esophagitis, no candidal infxn identified. Bx obtained, GI recs start PPI bid  - Pending swallow trial for NGT removal (planned for 2/4 per SLP)  - Will initiate PPI following IVIG completion (now commenced pantoprazole 40 IV daily 2/3/19)

## 2019-02-03 NOTE — SUBJECTIVE & OBJECTIVE
Interval History:   NAEO, pt still w/ irritating productive cough, possibly exacerbated by NGT but feels weakness/myopathy slowly improving. Continues NPO on tube feeds with swallow study tomorrow.     Oncology Treatment Plan:   OP BREAST DOCETAXEL Q3W    Medications:  Continuous Infusions:  Scheduled Meds:   amLODIPine  10 mg Per NG tube Daily    calcium-vitamin D3  1 tablet Oral BID    diphenhydrAMINE  50 mg Intravenous Q24H    enoxaparin  40 mg Subcutaneous Daily    escitalopram oxalate  10 mg Per NG tube Daily    folic acid  1 mg Oral Daily    guaifenesin 100 mg/5 ml  200 mg Per NG tube Q4H    methylPREDNISolone sodium succinate  24 mg Intravenous Q12H    pantoprazole  40 mg Intravenous Daily     PRN Meds:acetaminophen, ALPRAZolam, dextrose 50%, dextrose 50%, famotidine (PF), glucagon (human recombinant), glucose, glucose, hepatitis B, hydrALAZINE, insulin aspart U-100, ondansetron, oxyCODONE, pneumoc 13-brandin conj-dip cr(PF), polyethylene glycol, promethazine, sodium chloride 0.9%, sodium chloride 0.9%     Review of Systems   Constitutional: Positive for fatigue. Negative for activity change, appetite change, chills, diaphoresis, fever and unexpected weight change.   HENT: Positive for facial swelling and trouble swallowing. Negative for congestion, ear discharge, hearing loss, postnasal drip, sinus pressure, sneezing, sore throat and tinnitus.    Eyes: Negative for photophobia, pain and redness.   Respiratory: Negative for apnea, cough, choking, chest tightness, shortness of breath and stridor.    Cardiovascular: Negative for chest pain, palpitations and leg swelling.   Gastrointestinal: Negative for abdominal pain, anal bleeding, constipation, diarrhea, nausea and rectal pain.   Endocrine: Negative for polyuria.   Genitourinary: Negative for dysuria and hematuria.   Musculoskeletal: Positive for myalgias. Negative for arthralgias, back pain, gait problem, joint swelling, neck pain and neck stiffness.    Skin: Positive for rash. Negative for color change and pallor.   Allergic/Immunologic: Negative for immunocompromised state.   Neurological: Positive for weakness. Negative for dizziness, seizures and numbness.   Hematological: Positive for adenopathy. Does not bruise/bleed easily.   Psychiatric/Behavioral: Negative for agitation and behavioral problems.     Objective:     Vital Signs (Most Recent):  Temp: 98.3 °F (36.8 °C) (02/03/19 0759)  Pulse: 97 (02/03/19 0759)  Resp: 16 (02/03/19 0759)  BP: 114/62 (02/03/19 0759)  SpO2: 99 % (02/03/19 0759) Vital Signs (24h Range):  Temp:  [98.2 °F (36.8 °C)-98.7 °F (37.1 °C)] 98.3 °F (36.8 °C)  Pulse:  [] 97  Resp:  [15-20] 16  SpO2:  [97 %-99 %] 99 %  BP: (114-142)/(62-77) 114/62     Weight: 76.4 kg (168 lb 5.1 oz)  Body mass index is 28.01 kg/m².  Body surface area is 1.87 meters squared.      Intake/Output Summary (Last 24 hours) at 2/3/2019 0923  Last data filed at 2/3/2019 0027  Gross per 24 hour   Intake 1250 ml   Output 950 ml   Net 300 ml       Physical Exam   Constitutional: She is oriented to person, place, and time. She appears well-developed and well-nourished. No distress.   HENT:   Head: Atraumatic.   Nose: Nose normal.   Mouth/Throat: No oropharyngeal exudate.   Face appears swollen   Eyes: Conjunctivae and EOM are normal. Pupils are equal, round, and reactive to light. Right eye exhibits no discharge. Left eye exhibits no discharge. No scleral icterus.   Neck: Normal range of motion. Neck supple. No tracheal deviation present.   Cardiovascular: Normal rate, regular rhythm and intact distal pulses. Exam reveals no gallop and no friction rub.   Pulmonary/Chest: Effort normal and breath sounds normal. No respiratory distress. She has no wheezes. She has no rales. She exhibits no tenderness.   Abdominal: Soft. Bowel sounds are normal. She exhibits no distension and no mass. There is no tenderness. There is no rebound and no guarding.   Musculoskeletal:  Normal range of motion. She exhibits edema (facial edema, upper extremity edema b/l, resolving). She exhibits no tenderness or deformity.   Neurological: She is alert and oriented to person, place, and time. No cranial nerve deficit or sensory deficit.     4.5/5 shoulder strength on shoulder abduction; improving  5/5 flexion and extension preserved at elbow   Skin: Skin is warm and dry. Capillary refill takes less than 2 seconds. No rash noted. She is not diaphoretic. No erythema. No pallor.   Psychiatric: She has a normal mood and affect.   Vitals reviewed.      Significant Labs:   BMP:   Recent Labs   Lab 02/02/19 0444 02/03/19 0329   * 142*   * 132*   K 4.4 4.5    100   CO2 30* 28   BUN 25* 23*   CREATININE 0.6 0.6   CALCIUM 8.5* 8.4*   MG 2.1 1.9   , CBC:   Recent Labs   Lab 02/02/19 0444 02/03/19 0329   WBC 10.46 9.92   HGB 8.8* 8.3*   HCT 28.3* 25.9*   * 116*   , CMP:   Recent Labs   Lab 02/02/19 0444 02/03/19 0329   * 132*   K 4.4 4.5    100   CO2 30* 28   * 142*   BUN 25* 23*   CREATININE 0.6 0.6   CALCIUM 8.5* 8.4*   PROT 7.2 7.1   ALBUMIN 2.5* 2.3*   BILITOT 0.3 0.3   ALKPHOS 74 71   AST 93* 86*   ALT 40 36   ANIONGAP 4* 4*   EGFRNONAA >60.0 >60.0   , Coagulation: No results for input(s): PT, INR, APTT in the last 48 hours., Haptoglobin: No results for input(s): HAPTOGLOBIN in the last 48 hours., Immunology: No results for input(s): SPEP, ERVIN, DARCY, FREELAMBDALI in the last 48 hours., LDH: No results for input(s): LDHCSF, BFSOURCE in the last 48 hours., LFTs:   Recent Labs   Lab 02/02/19 0444 02/03/19 0329   ALT 40 36   AST 93* 86*   ALKPHOS 74 71   BILITOT 0.3 0.3   PROT 7.2 7.1   ALBUMIN 2.5* 2.3*   , Reticulocytes: No results for input(s): RETIC in the last 48 hours., Tumor Markers: No results for input(s): PSA, CEA, , AFPTM, [id number],  in the last 48 hours.    Invalid input(s): ALGTM, Uric Acid No results for input(s): URICACID in the last 48  hours., Urine Studies: No results for input(s): COLORU, APPEARANCEUA, PHUR, SPECGRAV, PROTEINUA, GLUCUA, KETONESU, BILIRUBINUA, OCCULTUA, NITRITE, UROBILINOGEN, LEUKOCYTESUR, RBCUA, WBCUA, BACTERIA, SQUAMEPITHEL, HYALINECASTS in the last 48 hours.    Invalid input(s): YANET,   Recent Lab Results       02/03/19  0329        Immature Granulocytes 0.7     Immature Grans (Abs) 0.07  Comment:  Mild elevation in immature granulocytes is non specific and   can be seen in a variety of conditions including stress response,   acute inflammation, trauma and pregnancy. Correlation with other   laboratory and clinical findings is essential.       Albumin 2.3     Alkaline Phosphatase 71     ALT 36     Anion Gap 4     AST 86     Baso # 0.00     Basophil% 0.0     Total Bilirubin 0.3  Comment:  For infants and newborns, interpretation of results should be based  on gestational age, weight and in agreement with clinical  observations.  Premature Infant recommended reference ranges:  Up to 24 hours.............<8.0 mg/dL  Up to 48 hours............<12.0 mg/dL  3-5 days..................<15.0 mg/dL  6-29 days.................<15.0 mg/dL       BUN, Bld 23     Calcium 8.4     Chloride 100     CO2 28          Creatinine 0.6     Differential Method Automated     eGFR if African American >60.0     eGFR if non  >60.0  Comment:  Calculation used to obtain the estimated glomerular filtration  rate (eGFR) is the CKD-EPI equation.        Eos # 0.0     Eosinophil% 0.0     Glucose 142     Gran # (ANC) 8.8     Gran% 88.3     Hematocrit 25.9     Hemoglobin 8.3     Lymph # 0.5     Lymph% 5.0     Magnesium 1.9     MCH 27.5     MCHC 32.0     MCV 86     Mono # 0.6     Mono% 6.0     MPV 9.6     nRBC 0     Phosphorus 2.4     Platelets 116     Potassium 4.5     Total Protein 7.1     RBC 3.02     RDW 18.6     Sodium 132     WBC 9.92        and All pertinent labs from the last 24 hours have been reviewed.    Diagnostic Results:  I  have reviewed and interpreted all pertinent imaging results/findings within the past 24 hours.

## 2019-02-03 NOTE — PROGRESS NOTES
[facility]  Hematology/Oncology  Progress Note    Patient Name: Ermelinda Verde  Admission Date: 1/22/2019  Hospital Length of Stay: 12 days  Code Status: Full Code     Subjective:     HPI:  Ms Verde is a 57 yo F with a prior diagnosis of L sided breast cancer, s/p 3 cycles of nab-paclitaxel and atezolizumab who presents from clinic with a roughly week long, multi-day history of proximal muscle weakness in the shoulder girdle muscles, bilateral and associated with mild tenderness. She reports generalized weakness, but strength impairment with the use of her upper extremities more than lower extremities, and her ADLs are intact. Her last PET scan in December 2018 showed almost complete resolution of her adenopathy, and she had been complaining of a rash, which had been attributed to Nab paclitaxel. Last week, 1/15, she had a CT neck/ since there was concern for facial swelling per symptoms, and the CT showed lymphadenopathy without airway or vascular compromise. 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 Most recently, the patient had been on 60mg of prednisone with a taper and had noted swelling, proximal weakness. She also notes some progressive swallowing difficulty, but attributes this to candidal thrush for which she takes nystatin scheduled. She reports poor PO intake preceding admission, dark, mario colored urine, but denies fevers or joint pain.      Onc History per Dr. Reyna:   She developed a palpable abnormality in her left breast in January 2017 which she noted on self-examination.  A diagnostic mammogram on January 19 showed a greater than 1 cm nodule in the upper outer portion of left breast.  By ultrasound this was lobulated and hypoechoic measuring 1.75 x 1.51 x 1.96 cm.     On January 24,  2017 a core needle biopsy was performed which showed infiltrating ductal carcinoma, high grade.  The tumor was ER negative, IL negative, and HER-2 negative.  A follow-up ultrasound on December 6 showed 2.5 x 2.2 x 1.5 cm left breast mass.  There was no abnormality noted in the left axilla.     She underwent sentinel lymph node biopsy on February 22.  That showed 4 negative lymph nodes.     She had 4 cycles of  Wilbur-adjuvantTaxotere and Cytoxan completed  on 5/9/17.     On June 26 she underwent left mastectomy.  That revealed 2 foci of invasive high-grade carcinoma measuring 14 mm and 1.5 mm.  Margins were negative.     She completed 4 cycles of adjuvant Adriamycin on September 12, 2017.     In October, she developed some left supraclavicular lymphadenopathy which turned out to be recurrence.     A fine-needle aspirate of the lymph node was performed on October 19th.  That showed metastatic carcinoma consistent with breast primary which was ER negative, IL negative and HER 2-negative.           Interval History:   NAEO, pt still w/ irritating productive cough, possibly exacerbated by NGT but feels weakness/myopathy slowly improving. Continues NPO on tube feeds with swallow study tomorrow.     Oncology Treatment Plan:   OP BREAST DOCETAXEL Q3W    Medications:  Continuous Infusions:  Scheduled Meds:   amLODIPine  10 mg Per NG tube Daily    calcium-vitamin D3  1 tablet Oral BID    diphenhydrAMINE  50 mg Intravenous Q24H    enoxaparin  40 mg Subcutaneous Daily    escitalopram oxalate  10 mg Per NG tube Daily    folic acid  1 mg Oral Daily    guaifenesin 100 mg/5 ml  200 mg Per NG tube Q4H    methylPREDNISolone sodium succinate  24 mg Intravenous Q12H    pantoprazole  40 mg Intravenous Daily     PRN Meds:acetaminophen, ALPRAZolam, dextrose 50%, dextrose 50%, famotidine (PF), glucagon (human recombinant), glucose, glucose, hepatitis B, hydrALAZINE, insulin aspart U-100, ondansetron, oxyCODONE, pneumoc 13-brandin  conj-dip cr(PF), polyethylene glycol, promethazine, sodium chloride 0.9%, sodium chloride 0.9%     Review of Systems   Constitutional: Positive for fatigue. Negative for activity change, appetite change, chills, diaphoresis, fever and unexpected weight change.   HENT: Positive for facial swelling and trouble swallowing. Negative for congestion, ear discharge, hearing loss, postnasal drip, sinus pressure, sneezing, sore throat and tinnitus.    Eyes: Negative for photophobia, pain and redness.   Respiratory: Negative for apnea, cough, choking, chest tightness, shortness of breath and stridor.    Cardiovascular: Negative for chest pain, palpitations and leg swelling.   Gastrointestinal: Negative for abdominal pain, anal bleeding, constipation, diarrhea, nausea and rectal pain.   Endocrine: Negative for polyuria.   Genitourinary: Negative for dysuria and hematuria.   Musculoskeletal: Positive for myalgias. Negative for arthralgias, back pain, gait problem, joint swelling, neck pain and neck stiffness.   Skin: Positive for rash. Negative for color change and pallor.   Allergic/Immunologic: Negative for immunocompromised state.   Neurological: Positive for weakness. Negative for dizziness, seizures and numbness.   Hematological: Positive for adenopathy. Does not bruise/bleed easily.   Psychiatric/Behavioral: Negative for agitation and behavioral problems.     Objective:     Vital Signs (Most Recent):  Temp: 98.3 °F (36.8 °C) (02/03/19 0759)  Pulse: 97 (02/03/19 0759)  Resp: 16 (02/03/19 0759)  BP: 114/62 (02/03/19 0759)  SpO2: 99 % (02/03/19 0759) Vital Signs (24h Range):  Temp:  [98.2 °F (36.8 °C)-98.7 °F (37.1 °C)] 98.3 °F (36.8 °C)  Pulse:  [] 97  Resp:  [15-20] 16  SpO2:  [97 %-99 %] 99 %  BP: (114-142)/(62-77) 114/62     Weight: 76.4 kg (168 lb 5.1 oz)  Body mass index is 28.01 kg/m².  Body surface area is 1.87 meters squared.      Intake/Output Summary (Last 24 hours) at 2/3/2019 0923  Last data filed at  2/3/2019 0027  Gross per 24 hour   Intake 1250 ml   Output 950 ml   Net 300 ml       Physical Exam   Constitutional: She is oriented to person, place, and time. She appears well-developed and well-nourished. No distress.   HENT:   Head: Atraumatic.   Nose: Nose normal.   Mouth/Throat: No oropharyngeal exudate.   Face appears swollen   Eyes: Conjunctivae and EOM are normal. Pupils are equal, round, and reactive to light. Right eye exhibits no discharge. Left eye exhibits no discharge. No scleral icterus.   Neck: Normal range of motion. Neck supple. No tracheal deviation present.   Cardiovascular: Normal rate, regular rhythm and intact distal pulses. Exam reveals no gallop and no friction rub.   Pulmonary/Chest: Effort normal and breath sounds normal. No respiratory distress. She has no wheezes. She has no rales. She exhibits no tenderness.   Abdominal: Soft. Bowel sounds are normal. She exhibits no distension and no mass. There is no tenderness. There is no rebound and no guarding.   Musculoskeletal: Normal range of motion. She exhibits edema (facial edema, upper extremity edema b/l, resolving). She exhibits no tenderness or deformity.   Neurological: She is alert and oriented to person, place, and time. No cranial nerve deficit or sensory deficit.     4.5/5 shoulder strength on shoulder abduction; improving  5/5 flexion and extension preserved at elbow   Skin: Skin is warm and dry. Capillary refill takes less than 2 seconds. No rash noted. She is not diaphoretic. No erythema. No pallor.   Psychiatric: She has a normal mood and affect.   Vitals reviewed.      Significant Labs:   BMP:   Recent Labs   Lab 02/02/19  0444 02/03/19  0329   * 142*   * 132*   K 4.4 4.5    100   CO2 30* 28   BUN 25* 23*   CREATININE 0.6 0.6   CALCIUM 8.5* 8.4*   MG 2.1 1.9   , CBC:   Recent Labs   Lab 02/02/19 0444 02/03/19  0329   WBC 10.46 9.92   HGB 8.8* 8.3*   HCT 28.3* 25.9*   * 116*   , CMP:   Recent Labs    Lab 02/02/19 0444 02/03/19 0329   * 132*   K 4.4 4.5    100   CO2 30* 28   * 142*   BUN 25* 23*   CREATININE 0.6 0.6   CALCIUM 8.5* 8.4*   PROT 7.2 7.1   ALBUMIN 2.5* 2.3*   BILITOT 0.3 0.3   ALKPHOS 74 71   AST 93* 86*   ALT 40 36   ANIONGAP 4* 4*   EGFRNONAA >60.0 >60.0   , Coagulation: No results for input(s): PT, INR, APTT in the last 48 hours., Haptoglobin: No results for input(s): HAPTOGLOBIN in the last 48 hours., Immunology: No results for input(s): SPEP, ERVIN, DARCY, FREELAMBDALI in the last 48 hours., LDH: No results for input(s): LDHCSF, BFSOURCE in the last 48 hours., LFTs:   Recent Labs   Lab 02/02/19 0444 02/03/19 0329   ALT 40 36   AST 93* 86*   ALKPHOS 74 71   BILITOT 0.3 0.3   PROT 7.2 7.1   ALBUMIN 2.5* 2.3*   , Reticulocytes: No results for input(s): RETIC in the last 48 hours., Tumor Markers: No results for input(s): PSA, CEA, , AFPTM, [id number],  in the last 48 hours.    Invalid input(s): ALGTM, Uric Acid No results for input(s): URICACID in the last 48 hours., Urine Studies: No results for input(s): COLORU, APPEARANCEUA, PHUR, SPECGRAV, PROTEINUA, GLUCUA, KETONESU, BILIRUBINUA, OCCULTUA, NITRITE, UROBILINOGEN, LEUKOCYTESUR, RBCUA, WBCUA, BACTERIA, SQUAMEPITHEL, HYALINECASTS in the last 48 hours.    Invalid input(s): YANET,   Recent Lab Results       02/03/19 0329        Immature Granulocytes 0.7     Immature Grans (Abs) 0.07  Comment:  Mild elevation in immature granulocytes is non specific and   can be seen in a variety of conditions including stress response,   acute inflammation, trauma and pregnancy. Correlation with other   laboratory and clinical findings is essential.       Albumin 2.3     Alkaline Phosphatase 71     ALT 36     Anion Gap 4     AST 86     Baso # 0.00     Basophil% 0.0     Total Bilirubin 0.3  Comment:  For infants and newborns, interpretation of results should be based  on gestational age, weight and in agreement with  clinical  observations.  Premature Infant recommended reference ranges:  Up to 24 hours.............<8.0 mg/dL  Up to 48 hours............<12.0 mg/dL  3-5 days..................<15.0 mg/dL  6-29 days.................<15.0 mg/dL       BUN, Bld 23     Calcium 8.4     Chloride 100     CO2 28          Creatinine 0.6     Differential Method Automated     eGFR if African American >60.0     eGFR if non  >60.0  Comment:  Calculation used to obtain the estimated glomerular filtration  rate (eGFR) is the CKD-EPI equation.        Eos # 0.0     Eosinophil% 0.0     Glucose 142     Gran # (ANC) 8.8     Gran% 88.3     Hematocrit 25.9     Hemoglobin 8.3     Lymph # 0.5     Lymph% 5.0     Magnesium 1.9     MCH 27.5     MCHC 32.0     MCV 86     Mono # 0.6     Mono% 6.0     MPV 9.6     nRBC 0     Phosphorus 2.4     Platelets 116     Potassium 4.5     Total Protein 7.1     RBC 3.02     RDW 18.6     Sodium 132     WBC 9.92        and All pertinent labs from the last 24 hours have been reviewed.    Diagnostic Results:  I have reviewed and interpreted all pertinent imaging results/findings within the past 24 hours.    Assessment/Plan:     * Myositis    - DDX dermatomyositis de haylie vs related to immunotherapy  - trend CPK daily; downtrending generally with a slight bump on 01/27  -restarted fluids 01/26 due to NPO  - myositis labs suspicious for myosits; rheum on board.   - F/u 1/24 skin biopsy. Pending results may need muscle biopsy by general surgery  -MRI left humerus suspicious for myositis    Per rheumatology  - Methylpred 125mg IV bid started, now tapered to 24mg IV bid  - Starting MTX 15mg subQ qWeekly  - IVIG x5 days completed 2/3/19 will require 1x maintenance dose 4 weeks from now per rheum  - myomarker panel, HMGCR, QG-TB pending  - PFTs as OP  - Quantitative IgA 533, IgG 932, IgM 66  - ID consulted for fast track vacc  - f/u with Dr. Swift and Dr. Campos in Rheumatology at discharge  - Currently  tapering steroids per rheumatology, transition to PO medrol once swallow study passed    Lab Results   Component Value Date     (H) 02/03/2019     (H) 02/02/2019     (H) 02/01/2019    CPK 1147 (H) 01/31/2019    CPK 1424 (H) 01/30/2019          Hypokalemia    Replete prn     Dysphagia    Patient is experiencing difficulty swallowing that has been increasing in severity over last couple of days to the point where patient did not feel as if she could swallow.  As of 01/26, SLP evaluated patient and determined that she should be NPO except for medications until a modified barium swallow could be performed. Possibly candidal esophagitis; patient has oral thrush.    Plan:  - High aspiration risk on modified barium study, strict NPO  - NGT in situ, tube feeds initiated per dietary consult  - Switched to IV meds where possible  - Fluconazole IVPB 200mg q24hr  - GI consulted, EGD pending today to r/o candidal esophagitis vs dermatomyositis motility dysfunction --> resulted w/ Grade C erosive esophagitis, no candidal infxn identified. Bx obtained, GI recs start PPI bid  - Pending swallow trial for NGT removal (planned for 2/4 per SLP)  - Will initiate PPI following IVIG completion (now commenced pantoprazole 40 IV daily 2/3/19)     Swelling of upper arm    -B/L U/S negative for DVTs  -appears less edematous than day prior  -continue to monitor     Dermatitis    - Skin biopsy from 1/24 pending, appreciate dermatology recs     Candidiasis    Oral thrush  -cont Nystatin swish and swallow. Additionally, added fluconaozle 200 mg Po qday on 01/26 as possibility patient is experiencing esophagitis 2/2 to candida  -appears resolved now s/p fluconazole and nystatin     Rhabdomyolysis    -see myositis. Continue supportive care, Cr/ electrolyte/ CPK monitoring and aggressive hydration. transaminitis on CMP is likely due to skeletal muscle rhabdo, not hepatic     Drug rash    - Unclear if symptoms related to  chemotherapy     Pre-diabetes    -check a1c, 0-5u SSI while on steroids, add prandial insulin as warranted.      Adjustment disorder with depressed mood    -cont home SSRI     Vitamin B12 deficiency    -cont home B12 supplementation      Breast cancer of upper-outer quadrant of left female breast    -On January 24, 2017 a core needle biopsy was performed which showed infiltrating ductal carcinoma, high grade.  The tumor was ER negative, AL negative, and HER-2 negative.  -She had 4 cycles of  Wilbur-adjuvantTaxotere and Cytoxan completed  on 5/9/17.  -On June 26 she underwent left mastectomy.  That revealed 2 foci of invasive high-grade carcinoma measuring 14 mm and 1.5 mm.  Margins were negative.  -She completed 4 cycles of adjuvant Adriamycin on September 12, 2017.  -  A fine-needle aspirate of the lymph node was performed on October 19th.  That showed metastatic carcinoma consistent with breast primary which was ER negative, AL negative and HER 2-negative.    -Per rheumatology: hold paclitaxel agent and atezolizumb at this time - both can cause myositis      Hypertension    -on home amlodipine-benazepril; will hold ACEi for now and observe renal function with CPK elevation in rhabdo.     Plan:  - Amlodipine 10 mg qday on admission  - 1/28 Pt now strict NPO due to failed swallow study, now on hydralazine 10mg IV q8h WCTM and adjust PRN              Noah Patel MD  Hematology/Oncology  [facility]        I have reviewed the notes, assessments, and/or procedures performed by the housestaff, as above.  I have personally interviewed and examined the patient at the beside, and rounded with the housestaff. I concur with her/his assessment and plan and the documentation of Ermelinda Verde.  I, Dr. Conrad Cheung, personally spent more than  35 mins during this encounter, greater than 50% was spent in direct counseling and/or coordination of care.     Conrad Cheung M.D., M.S.,  EDWARD.  Hematology/Oncology Attending  [facility]

## 2019-02-03 NOTE — PLAN OF CARE
Problem: Adult Inpatient Plan of Care  Goal: Plan of Care Review  NAD during shift. POC reviewed with patient. Isosource 1.5cal continuous rate without residual, tolerating goal rate well. Patient was able to take a shower today with standby assist. Bed in lowest position with wheels locked, call light within reach. Instructed patient to call if needing assistance.

## 2019-02-03 NOTE — PROGRESS NOTES
[facility]  Rheumatology  Progress Note    Patient Name: Ermelinda Verde  MRN: [id number]  Admission Date: 1/22/2019  Hospital Length of Stay: 12 days  Code Status: Full Code   Attending Provider: Alex Reyna MD  Primary Care Physician: Reta Weeks MD  Principal Problem: Myositis    Subjective:     HPI: 57yo F with history of L sided infiltrating ductal carcinoma of the L breast (dx on Jan 2017), HTN, depression, and gastritis was send from hem/onc clinic for evaluation of possible inflammatory myositis.     L breast cancer s/p completion of 4 cycles of demi-adjuvant taxotere and cytoxan (completed on 5/9/17) and mastectomy (6/26/17) and then 4 cycles of adjuvant Adriamycin (completed 9/12/17). In 10/2017 - patient developed L supraclavicular lymphadenopathy which FNA confirmed as reoccurrence of previously treated breast cancer.     Patient started on Atelizumab/Abraxane on 11/7/18. Per chart review, patient noticed rashes on the dorsum of her hands on 11/11/18. Seen in urgent care and given topical steroid cream. Then received two more infusions on Atelizumab/Abraxane on 11/21/18 and 12/5/18. Started to notice puffiness around the eyes on 12/11/18. Received another Abraxane infusion on 12/12/18 but this time with hydrocortisone 50 mg which helped reduce the swelling around the eyes. Developed swelling of the face again on 12/15/18. Next infusion of Abraxane done on 12/19/18 with Solucortef and Atelizumab held. Abraxane given again on 1/3/19 with no IV steroid. Patient developed swelling of the face on 1/4/19 and went to urgent care and given short course of prednisone 20 mg BID. On 1/15/19, patient seen in hem/onc clinic with c/o of pressure and tightness around neck. CT scan of chest and neck did not reveal any vascular compression. Started on prednisone 60 mg with taper. On 1/22/18 patient with c/o proximal muscle weakness. CPK found to be elevated around 4k. Patient admitted and given  solumedrol 80 mg IV on 1/22/18. Last infusion of Atelizumab on 12/19/18. Last infusion of Abraxane on 1/3/19. Given Solumedrol 1g x 1 on 1/23/18. Seen by Dermatology on 1/24/18 and biopsy done of skin rash. Given distribution of rashes, there was concern for dermatomyositis. Patient started on Solumedrol 125 mg IV BID at this time.     Denies any family history of autoimmune diseases.    No smoking, EtOH, recreational drug usage.    Denies any photosensitivity, joint swelling, unintentional weigh loss, abdominal pain, night sweats, CP, SOB.  +oral thrush.     Interval History: Patient seen at bedside. Feels about the same. Strength and swallowing slowly improving. Pending repeat swallow eval on Monday. Completed IVIG treatment. Remains NPO. No new rashes. No trouble breathing, f/c, n/v/d.     Current Facility-Administered Medications   Medication Frequency    acetaminophen tablet 650 mg Q4H PRN    ALPRAZolam tablet 0.25 mg TID PRN    amLODIPine tablet 10 mg Daily    calcium-vitamin D3 500 mg(1,250mg) -200 unit per tablet 1 tablet BID    dextrose 50% injection 12.5 g PRN    dextrose 50% injection 25 g PRN    diphenhydrAMINE injection 50 mg Q24H    enoxaparin injection 40 mg Daily    escitalopram oxalate tablet 10 mg Daily    famotidine (PF) injection 20 mg PRN    folic acid tablet 1 mg Daily    glucagon (human recombinant) injection 1 mg PRN    glucose chewable tablet 16 g PRN    glucose chewable tablet 24 g PRN    guaifenesin 100 mg/5 ml syrup 200 mg Q4H    hepatitis B (HEPLISAV-B) 20 mcg/0.5 mL vaccine 0.5 mL vaccine x 1 dose    hydrALAZINE injection 10 mg Q6H PRN    insulin aspart U-100 pen 0-5 Units QID (AC + HS) PRN    methylPREDNISolone sodium succinate injection 24 mg Q12H    ondansetron disintegrating tablet 8 mg Q8H PRN    oxyCODONE immediate release tablet 5 mg Q6H PRN    pantoprazole injection 40 mg Daily    pneumoc 13-brandin conj-dip cr(PF) (PREVNAR 13 (PF)) 0.5 mL vaccine x 1 dose     polyethylene glycol packet 17 g BID PRN    promethazine tablet 12.5 mg Q6H PRN    sodium chloride 0.9% flush 10 mL PRN    sodium chloride 0.9% flush 5 mL PRN     Objective:     Vital Signs (Most Recent):  Temp: 98.3 °F (36.8 °C) (02/03/19 0759)  Pulse: 97 (02/03/19 0759)  Resp: 16 (02/03/19 0759)  BP: 114/62 (02/03/19 0759)  SpO2: 99 % (02/03/19 0759)  O2 Device (Oxygen Therapy): room air (02/03/19 0759) Vital Signs (24h Range):  Temp:  [98.2 °F (36.8 °C)-98.7 °F (37.1 °C)] 98.3 °F (36.8 °C)  Pulse:  [] 97  Resp:  [15-20] 16  SpO2:  [97 %-99 %] 99 %  BP: (114-142)/(62-77) 114/62     Weight: 76.4 kg (168 lb 5.1 oz) (02/03/19 0400)  Body mass index is 28.01 kg/m².  Body surface area is 1.87 meters squared.      Intake/Output Summary (Last 24 hours) at 2/3/2019 0949  Last data filed at 2/3/2019 0900  Gross per 24 hour   Intake 1560 ml   Output 950 ml   Net 610 ml       Physical Exam   Constitutional: She is oriented to person, place, and time and well-developed, well-nourished, and in no distress. No distress.   HENT:   Head: Normocephalic and atraumatic.   Right Ear: External ear normal.   Left Ear: External ear normal.   Bilateral orbital edema with heliotropic rash - resolved   Eyes: Conjunctivae and EOM are normal. Pupils are equal, round, and reactive to light.   Neck: Normal range of motion. Neck supple.   Cardiovascular: Normal rate, regular rhythm, normal heart sounds and intact distal pulses.    Pulmonary/Chest: Effort normal and breath sounds normal. No respiratory distress.   Abdominal: Soft. Bowel sounds are normal.       Right Side Rheumatological Exam     Muscle Strength (0-5 scale):  Neck Flexion:  4.8  Neck Extension: 5  Deltoid:  4.2  Biceps: 5/5   Triceps:  4.6  : 5/5   Iliopsoas: 4.6  Quadriceps:  5   Distal Lower Extremity: 5    Left Side Rheumatological Exam     Muscle Strength (0-5 scale):  Neck Flexion:  4.8  Neck Extension: 5  Deltoid:  4.2  Biceps: 5/5   Triceps:  4.6  :  5/5    Iliopsoas: 4.6  Quadriceps:  5   Distal Lower Extremity: 5      Neurological: She is alert and oriented to person, place, and time. GCS score is 15.   Skin: Skin is warm and dry. No rash noted.     Hyperpigmented non raised rash over nape of neck, elbows, knuckles (resemble Gottron's papules), thighs, and ankles -resolving       Psychiatric: Mood, memory, affect and judgment normal.   Musculoskeletal: Normal range of motion. She exhibits edema. She exhibits no tenderness or deformity.         Significant Labs:  Recent Results (from the past 48 hour(s))   Comprehensive Metabolic Panel (CMP)    Collection Time: 02/02/19  4:44 AM   Result Value Ref Range    Sodium 135 (L) 136 - 145 mmol/L    Potassium 4.4 3.5 - 5.1 mmol/L    Chloride 101 95 - 110 mmol/L    CO2 30 (H) 23 - 29 mmol/L    Glucose 151 (H) 70 - 110 mg/dL    BUN, Bld 25 (H) 6 - 20 mg/dL    Creatinine 0.6 0.5 - 1.4 mg/dL    Calcium 8.5 (L) 8.7 - 10.5 mg/dL    Total Protein 7.2 6.0 - 8.4 g/dL    Albumin 2.5 (L) 3.5 - 5.2 g/dL    Total Bilirubin 0.3 0.1 - 1.0 mg/dL    Alkaline Phosphatase 74 55 - 135 U/L    AST 93 (H) 10 - 40 U/L    ALT 40 10 - 44 U/L    Anion Gap 4 (L) 8 - 16 mmol/L    eGFR if African American >60.0 >60 mL/min/1.73 m^2    eGFR if non African American >60.0 >60 mL/min/1.73 m^2   Magnesium    Collection Time: 02/02/19  4:44 AM   Result Value Ref Range    Magnesium 2.1 1.6 - 2.6 mg/dL   Phosphorus    Collection Time: 02/02/19  4:44 AM   Result Value Ref Range    Phosphorus 2.3 (L) 2.7 - 4.5 mg/dL   CK    Collection Time: 02/02/19  4:44 AM   Result Value Ref Range     (H) 20 - 180 U/L   CBC auto differential    Collection Time: 02/02/19  4:44 AM   Result Value Ref Range    WBC 10.46 3.90 - 12.70 K/uL    RBC 3.21 (L) 4.00 - 5.40 M/uL    Hemoglobin 8.8 (L) 12.0 - 16.0 g/dL    Hematocrit 28.3 (L) 37.0 - 48.5 %    MCV 88 82 - 98 fL    MCH 27.4 27.0 - 31.0 pg    MCHC 31.1 (L) 32.0 - 36.0 g/dL    RDW 18.5 (H) 11.5 - 14.5 %    Platelets 126 (L)  150 - 350 K/uL    MPV 9.9 9.2 - 12.9 fL    Immature Granulocytes 0.8 (H) 0.0 - 0.5 %    Gran # (ANC) 9.1 (H) 1.8 - 7.7 K/uL    Immature Grans (Abs) 0.08 (H) 0.00 - 0.04 K/uL    Lymph # 0.6 (L) 1.0 - 4.8 K/uL    Mono # 0.7 0.3 - 1.0 K/uL    Eos # 0.0 0.0 - 0.5 K/uL    Baso # 0.00 0.00 - 0.20 K/uL    nRBC 0 0 /100 WBC    Gran% 86.6 (H) 38.0 - 73.0 %    Lymph% 5.9 (L) 18.0 - 48.0 %    Mono% 6.7 4.0 - 15.0 %    Eosinophil% 0.0 0.0 - 8.0 %    Basophil% 0.0 0.0 - 1.9 %    Differential Method Automated    Comprehensive Metabolic Panel (CMP)    Collection Time: 02/03/19  3:29 AM   Result Value Ref Range    Sodium 132 (L) 136 - 145 mmol/L    Potassium 4.5 3.5 - 5.1 mmol/L    Chloride 100 95 - 110 mmol/L    CO2 28 23 - 29 mmol/L    Glucose 142 (H) 70 - 110 mg/dL    BUN, Bld 23 (H) 6 - 20 mg/dL    Creatinine 0.6 0.5 - 1.4 mg/dL    Calcium 8.4 (L) 8.7 - 10.5 mg/dL    Total Protein 7.1 6.0 - 8.4 g/dL    Albumin 2.3 (L) 3.5 - 5.2 g/dL    Total Bilirubin 0.3 0.1 - 1.0 mg/dL    Alkaline Phosphatase 71 55 - 135 U/L    AST 86 (H) 10 - 40 U/L    ALT 36 10 - 44 U/L    Anion Gap 4 (L) 8 - 16 mmol/L    eGFR if African American >60.0 >60 mL/min/1.73 m^2    eGFR if non African American >60.0 >60 mL/min/1.73 m^2   Magnesium    Collection Time: 02/03/19  3:29 AM   Result Value Ref Range    Magnesium 1.9 1.6 - 2.6 mg/dL   Phosphorus    Collection Time: 02/03/19  3:29 AM   Result Value Ref Range    Phosphorus 2.4 (L) 2.7 - 4.5 mg/dL   CK    Collection Time: 02/03/19  3:29 AM   Result Value Ref Range     (H) 20 - 180 U/L   CBC auto differential    Collection Time: 02/03/19  3:29 AM   Result Value Ref Range    WBC 9.92 3.90 - 12.70 K/uL    RBC 3.02 (L) 4.00 - 5.40 M/uL    Hemoglobin 8.3 (L) 12.0 - 16.0 g/dL    Hematocrit 25.9 (L) 37.0 - 48.5 %    MCV 86 82 - 98 fL    MCH 27.5 27.0 - 31.0 pg    MCHC 32.0 32.0 - 36.0 g/dL    RDW 18.6 (H) 11.5 - 14.5 %    Platelets 116 (L) 150 - 350 K/uL    MPV 9.6 9.2 - 12.9 fL    Immature Granulocytes 0.7  (H) 0.0 - 0.5 %    Gran # (ANC) 8.8 (H) 1.8 - 7.7 K/uL    Immature Grans (Abs) 0.07 (H) 0.00 - 0.04 K/uL    Lymph # 0.5 (L) 1.0 - 4.8 K/uL    Mono # 0.6 0.3 - 1.0 K/uL    Eos # 0.0 0.0 - 0.5 K/uL    Baso # 0.00 0.00 - 0.20 K/uL    nRBC 0 0 /100 WBC    Gran% 88.3 (H) 38.0 - 73.0 %    Lymph% 5.0 (L) 18.0 - 48.0 %    Mono% 6.0 4.0 - 15.0 %    Eosinophil% 0.0 0.0 - 8.0 %    Basophil% 0.0 0.0 - 1.9 %    Differential Method Automated      Results for ROMEO PEREZ (MRN [id number]) as of 2/3/2019 09:51   Ref. Range 1/30/2019 05:45 1/31/2019 04:00 2/1/2019 04:12 2/2/2019 04:44 2/3/2019 03:29   CPK Latest Ref Range: 20 - 180 U/L 1424 (H) 1147 (H) 827 (H) 662 (H) 517 (H)     Results for ROMEO PEREZ (MRN [id number]) as of 1/28/2019 10:23   Ref. Range 1/24/2019 03:40 1/25/2019 04:20 1/26/2019 04:00 1/27/2019 04:21 1/28/2019 04:00   Aldolase Latest Ref Range: 1.2 - 7.6 U/L 12.7 (H) 14.2 (H) 13.7 (H) 13.1 (H) 15.4 (H)   Results for ROMEO PEREZ (MRN [id number]) as of 1/28/2019 10:23   Ref. Range 1/22/2019 22:24   Sed Rate Latest Ref Range: 0 - 36 mm/Hr 50 (H)     Results for ROMEO PEREZ (MRN [id number]) as of 1/28/2019 10:23   Ref. Range 1/22/2019 22:24   CRP Latest Ref Range: 0.0 - 8.2 mg/L 31.1 (H)      Results for ROMEO PEREZ (MRN [id number]) as of 1/28/2019 10:23   Ref. Range 1/22/2019 22:24 1/25/2019 17:33   DARCY HEP-2 Titer Unknown Positive 1:640 Sp...    Anti-SSA Antibody Latest Ref Range: 0.00 - 19.99 EU 0.49    Anti-SSA Interpretation Latest Ref Range: Negative  Negative    Anti-SSB Antibody Latest Ref Range: 0.00 - 19.99 EU 0.11    Anti-SSB Interpretation Latest Ref Range: Negative  Negative    ds DNA Ab Latest Ref Range: Negative 1:10  Negative 1:10    Anti Sm Antibody Latest Ref Range: 0.00 - 19.99 EU 0.58    Anti-Sm Interpretation Latest Ref Range: Negative  Negative    Anti Sm/RNP Antibody Latest Ref Range: 0.00 - 19.99 EU 0.62    Anti-Sm/RNP Interpretation Latest Ref Range: Negative  Negative    DARCY  Screen Latest Ref Range: Negative <1:160  Positive (A)    Complement (C-3) Latest Ref Range: 50 - 180 mg/dL  106   Complement (C-4) Latest Ref Range: 11 - 44 mg/dL  29   Meg-1 Autoantibodies Latest Ref Range: <1.0 Index <1.00    Results for ROMEO PEREZ (MRN [id number]) as of 1/28/2019 10:23   Ref. Range 1/23/2019 02:55 1/23/2019 02:56   Specimen UA Unknown Urine, Clean Catch    Color, UA Latest Ref Range: Yellow, Straw, [place]  Colorless (A)    pH, UA Latest Ref Range: 5.0 - 8.0  6.0    Specific Gravity, UA Latest Ref Range: 1.005 - 1.030  1.005    Appearance, UA Latest Ref Range: Clear  Clear    Protein, UA Latest Ref Range: Negative  Negative    Glucose, UA Latest Ref Range: Negative  Negative    Ketones, UA Latest Ref Range: Negative  Negative    Occult Blood UA Latest Ref Range: Negative  Negative    Nitrite, UA Latest Ref Range: Negative  Negative    Bilirubin (UA) Latest Ref Range: Negative  Negative    Leukocytes, UA Latest Ref Range: Negative  Trace (A)    RBC, UA Latest Ref Range: 0 - 4 /hpf  2   WBC, UA Latest Ref Range: 0 - 5 /hpf  3   Squam Epithel, UA Latest Units: /hpf  0   Microscopic Comment Unknown  SEE COMMENT     Meg-1: Negative    Significant Imaging:  MRI Humerus w/ and w/o contrast:  Impression       1. Diffuse edema and enhancement of the visualized left upper extremity musculature, most pronounced proximally, most notably of the deltoid and biceps musculature as detailed above.  Findings are nonspecific although could relate to a nonspecific myelitis particularly in light of patient's reported history.  Clinical correlation with appropriate history and lab markers advised.  2. No evidence of abscess or osteomyelitis.  This report was flagged in Epic as abnormal.     NM PET CT 12/27/18:  Impression       See above    Significant improvement in all the previously seen lymph nodes involving the left lower neck, supraclavicular region, axillary and retropectoral region.    Index left  retropectoral SUV max 3.57, previously 27.2.     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 Biopsied.                        - Small hiatal hernia.                        - Normal stomach.                        - Normal examined duodenum.  Recommendation:       - Return patient to hospital to for ongoing care.                        - Await pathology results.                        - Use a proton pump inhibitor PO BID.                        - The findings and recommendations were discussed                         with the designated responsible adult.                        - Repeat upper endoscopy in 8 weeks to check                         healing.           Assessment/Plan:     * Myositis    57yo F with history of L sided infiltrating ductal carcinoma of the L breast (dx on Jan 2017), HTN, depression, and gastritis was send from hem/onc clinic for evaluation of possible inflammatory myositis.     Patient started on Atelizumab/Abraxane on 11/7/18. Per chart review, patient noticed rashes on the dorsum of her hands on 11/11/18. Seen in urgent care and given topical steroid cream. Then received two more infusions on Atelizumab/Abraxane on 11/21/18 and 12/5/18. Started to notice puffiness around the eyes on 12/11/18. Received another Abraxane infusion on 12/12/18 but this time with hydrocortisone 50 mg which helped reduce the swelling around the eyes. Developed swelling of the face again on 12/15/18. Next infusion of Abraxane done on 12/19/18 with Solucortef and Atelizumab held. Abraxane given again on 1/3/19 with no IV steroid. Patient developed swelling of the face on 1/4/19 and went to urgent care and given short course of prednisone 20 mg BID. On 1/15/19, patient seen in hem/onc clinic with c/o of pressure and tightness around neck. CT scan of chest and neck did not reveal any vascular compression.  "Started on prednisone 60 mg with taper. On 1/22/18 patient with c/o proximal muscle weakness. CPK found to be elevated around 4k. Patient admitted and given solumedrol 80 mg IV on 1/22/18. Last infusion of Atelizumab on 12/19/18. Last infusion of Abraxane on 1/3/19. Given Solumedrol 1g x 1 on 1/23/18. Seen by Dermatology on 1/24/18 and biopsy done of skin rash. Given distribution of rashes, there was concern for dermatomyositis. Patient started on Solumedrol 125 mg IV BID at this time.      Labs: ESR 50, CRP 31.1, CPK 4562 (trending down), Aldolase 13.6.  , ALT 64.  UA normal.  CBC unremarkable.  +DARCY 1:640 speckled with negative profile.  Complements normal. Normal GGT. RPR, HIV negative. Hep B/C negative.  Strongyloides negative. HMGCR antibody negative.    Spirometry: normal, normal diffusion capacity. FVC: 81%, DLCO: 114%    Case reports of docetaxel related inflammatory myositis had been documented. "...taxanes have been noted to cause disabling but transient arthralgia and myalgias; it is important to consider the possibility of inflammatory myopathy as a possible complication in patients undergoing treatment with these agents." - A case of docetaxel induced myositis and review of literature. Austin et al 2015.    Case reports in Rheumatology   Case reports of atezolizumb (immunomodulator) cause of myositis  - Lucy et al 2017 "Myositis as an adverse even of immune checkpoint blockade for cancer therapy.  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   Dermatomyositis can be associated with malignancy.  MRI of LUE showed edema of the deltoid and biceps.  Exam with proximal muscle weakness: b/l deltoids 4/5, b/l iliopsoas 4.4/5. 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     Inpatient treatments so far:  - Solumedrol 80 mg IV x 1 on 1/22/19  - Solumedrol 1 g IV x 1 on 1/23/19.  - Solumedrol 125 mg IV BID since 1/24/19 -->being tapered down.  - IVIG 1/29/19-2/2     Plan:  - chemotherapy can be re-started as it may help treat Dermatomyositis. Would recommend against checkpoint inhibitor, however, as it may have been a trigger for the dermatomyositis  - continue to monitor CPK, aldolase, AST/ALT  - Change Solumedrol to 24 mg IV q 12h. Once swallowing re-evaluated can change to Medrol 48 mg oral daily.  - completed IVIg 400 mg/kg daily x 5 days   - MTX 15 mg sc weekly (Tuesdays) with daily folic acid.   - recommend ID fast track consult for vaccinations.  - f/u Quant TB  - f/u esophageal biopsy results  - PT-->Rehab  - f/u myomarker panel   - PFTs with lung volumes as outpatient.   - 1200 mg dietary calcium and Vitamin D3 1000 mg daily  - continue H2 blocker  - f/u with Dr. Swift and Dr. Campos in Rheumatology on 2/15/19 at 2PM           Wilner Reynolds MD  Rheumatology  [facility]        I  Have personally take the history and examined the patient and agree with fellow's note as stated above. Note slight worsening of iron deficiency anemia. Suggest f/u Fe/TIBC, ferritin, B12, folate and needs colonoscopy and ? EGD by Gastro after discharge. Continue Solu-Medrol 24mg q 12h, then Medrol 48 mg daily after swallowing re-evaluated. MTX 15mg sc every Tuesday. Completed IVIg, will need maintenance dose in 4 wks(1g/kg) x 1 day. Await Myomarker. Lung volumes as outpatient.Con PT then Rehab

## 2019-02-04 LAB
ALBUMIN SERPL BCP-MCNC: 2.4 G/DL
ALDOLASE SERPL-CCNC: 1.7 U/L
ALDOLASE SERPL-CCNC: 1.7 U/L
ALDOLASE SERPL-CCNC: 2.7 U/L
ALP SERPL-CCNC: 82 U/L
ALT SERPL W/O P-5'-P-CCNC: 36 U/L
ANION GAP SERPL CALC-SCNC: 4 MMOL/L
AST SERPL-CCNC: 88 U/L
BASOPHILS # BLD AUTO: 0 K/UL
BASOPHILS NFR BLD: 0 %
BILIRUB SERPL-MCNC: 0.3 MG/DL
BUN SERPL-MCNC: 23 MG/DL
CALCIUM SERPL-MCNC: 8.6 MG/DL
CHLORIDE SERPL-SCNC: 98 MMOL/L
CK SERPL-CCNC: 465 U/L
CO2 SERPL-SCNC: 29 MMOL/L
CREAT SERPL-MCNC: 0.6 MG/DL
DIFFERENTIAL METHOD: ABNORMAL
EOSINOPHIL # BLD AUTO: 0 K/UL
EOSINOPHIL NFR BLD: 0 %
ERYTHROCYTE [DISTWIDTH] IN BLOOD BY AUTOMATED COUNT: 19 %
EST. GFR  (AFRICAN AMERICAN): >60 ML/MIN/1.73 M^2
EST. GFR  (NON AFRICAN AMERICAN): >60 ML/MIN/1.73 M^2
FERRITIN SERPL-MCNC: 641 NG/ML
GLUCOSE SERPL-MCNC: 140 MG/DL
HAPTOGLOB SERPL-MCNC: 153 MG/DL
HCT VFR BLD AUTO: 26.2 %
HGB BLD-MCNC: 8.2 G/DL
IMM GRANULOCYTES # BLD AUTO: 0.08 K/UL
IMM GRANULOCYTES NFR BLD AUTO: 0.8 %
IRON SERPL-MCNC: 37 UG/DL
LDH SERPL L TO P-CCNC: 325 U/L
LYMPHOCYTES # BLD AUTO: 0.5 K/UL
LYMPHOCYTES NFR BLD: 4.9 %
MAGNESIUM SERPL-MCNC: 1.8 MG/DL
MCH RBC QN AUTO: 27.2 PG
MCHC RBC AUTO-ENTMCNC: 31.3 G/DL
MCV RBC AUTO: 87 FL
MONOCYTES # BLD AUTO: 0.6 K/UL
MONOCYTES NFR BLD: 5.8 %
NEUTROPHILS # BLD AUTO: 9.1 K/UL
NEUTROPHILS NFR BLD: 88.5 %
NRBC BLD-RTO: 0 /100 WBC
PHOSPHATE SERPL-MCNC: 2.8 MG/DL
PLATELET # BLD AUTO: 139 K/UL
PMV BLD AUTO: 11.2 FL
POTASSIUM SERPL-SCNC: 4.4 MMOL/L
PROT SERPL-MCNC: 7 G/DL
RBC # BLD AUTO: 3.01 M/UL
RETICS/RBC NFR AUTO: 2.6 %
SATURATED IRON: 15 %
SODIUM SERPL-SCNC: 131 MMOL/L
TOTAL IRON BINDING CAPACITY: 247 UG/DL
TRANSFERRIN SERPL-MCNC: 167 MG/DL
TRANSFERRIN SERPL-MCNC: 167 MG/DL
WBC # BLD AUTO: 10.26 K/UL

## 2019-02-04 PROCEDURE — 84100 ASSAY OF PHOSPHORUS: CPT

## 2019-02-04 PROCEDURE — 99233 SBSQ HOSP IP/OBS HIGH 50: CPT | Mod: ,,, | Performed by: INTERNAL MEDICINE

## 2019-02-04 PROCEDURE — 82550 ASSAY OF CK (CPK): CPT

## 2019-02-04 PROCEDURE — 25000003 PHARM REV CODE 250: Performed by: INTERNAL MEDICINE

## 2019-02-04 PROCEDURE — 80053 COMPREHEN METABOLIC PANEL: CPT

## 2019-02-04 PROCEDURE — 83615 LACTATE (LD) (LDH) ENZYME: CPT

## 2019-02-04 PROCEDURE — 83735 ASSAY OF MAGNESIUM: CPT

## 2019-02-04 PROCEDURE — 25000003 PHARM REV CODE 250: Performed by: STUDENT IN AN ORGANIZED HEALTH CARE EDUCATION/TRAINING PROGRAM

## 2019-02-04 PROCEDURE — 20600001 HC STEP DOWN PRIVATE ROOM

## 2019-02-04 PROCEDURE — 82085 ASSAY OF ALDOLASE: CPT

## 2019-02-04 PROCEDURE — 97110 THERAPEUTIC EXERCISES: CPT

## 2019-02-04 PROCEDURE — 83540 ASSAY OF IRON: CPT

## 2019-02-04 PROCEDURE — 63600175 PHARM REV CODE 636 W HCPCS: Performed by: INTERNAL MEDICINE

## 2019-02-04 PROCEDURE — 92526 ORAL FUNCTION THERAPY: CPT

## 2019-02-04 PROCEDURE — 85025 COMPLETE CBC W/AUTO DIFF WBC: CPT

## 2019-02-04 PROCEDURE — 36415 COLL VENOUS BLD VENIPUNCTURE: CPT

## 2019-02-04 PROCEDURE — 97535 SELF CARE MNGMENT TRAINING: CPT

## 2019-02-04 PROCEDURE — 97116 GAIT TRAINING THERAPY: CPT

## 2019-02-04 PROCEDURE — 85045 AUTOMATED RETICULOCYTE COUNT: CPT

## 2019-02-04 PROCEDURE — 83010 ASSAY OF HAPTOGLOBIN QUANT: CPT

## 2019-02-04 PROCEDURE — 82728 ASSAY OF FERRITIN: CPT

## 2019-02-04 PROCEDURE — 99233 PR SUBSEQUENT HOSPITAL CARE,LEVL III: ICD-10-PCS | Mod: ,,, | Performed by: INTERNAL MEDICINE

## 2019-02-04 RX ORDER — FAMOTIDINE 20 MG/1
20 TABLET, FILM COATED ORAL 2 TIMES DAILY
Status: DISCONTINUED | OUTPATIENT
Start: 2019-02-04 | End: 2019-02-08

## 2019-02-04 RX ADMIN — OYSTER SHELL CALCIUM WITH VITAMIN D 1 TABLET: 500; 200 TABLET, FILM COATED ORAL at 10:02

## 2019-02-04 RX ADMIN — AMLODIPINE BESYLATE 10 MG: 10 TABLET ORAL at 10:02

## 2019-02-04 RX ADMIN — FAMOTIDINE 20 MG: 20 TABLET ORAL at 10:02

## 2019-02-04 RX ADMIN — GUAIFENESIN 200 MG: 200 SOLUTION ORAL at 10:02

## 2019-02-04 RX ADMIN — METHYLPREDNISOLONE SODIUM SUCCINATE 24 MG: 125 INJECTION, POWDER, FOR SOLUTION INTRAMUSCULAR; INTRAVENOUS at 10:02

## 2019-02-04 RX ADMIN — OYSTER SHELL CALCIUM WITH VITAMIN D 1 TABLET: 500; 200 TABLET, FILM COATED ORAL at 09:02

## 2019-02-04 RX ADMIN — GUAIFENESIN 200 MG: 200 SOLUTION ORAL at 05:02

## 2019-02-04 RX ADMIN — GUAIFENESIN 200 MG: 200 SOLUTION ORAL at 02:02

## 2019-02-04 RX ADMIN — GUAIFENESIN 200 MG: 200 SOLUTION ORAL at 09:02

## 2019-02-04 RX ADMIN — FOLIC ACID 1 MG: 1 TABLET ORAL at 10:02

## 2019-02-04 RX ADMIN — ENOXAPARIN SODIUM 40 MG: 100 INJECTION SUBCUTANEOUS at 05:02

## 2019-02-04 RX ADMIN — GUAIFENESIN 200 MG: 200 SOLUTION ORAL at 01:02

## 2019-02-04 RX ADMIN — FAMOTIDINE 20 MG: 20 TABLET ORAL at 09:02

## 2019-02-04 RX ADMIN — METHYLPREDNISOLONE SODIUM SUCCINATE 24 MG: 125 INJECTION, POWDER, FOR SOLUTION INTRAMUSCULAR; INTRAVENOUS at 09:02

## 2019-02-04 RX ADMIN — ESCITALOPRAM OXALATE 10 MG: 5 TABLET, FILM COATED ORAL at 10:02

## 2019-02-04 NOTE — PLAN OF CARE
Problem: SLP Goal  Goal: SLP Goal  Speech-Language Pathology Goals  Goals expected to be met by 2/2 - Goals remain appropriate for 2/11  1) Pt will participate in ongoing swallow assessment to determine appropriateness of po diet.   2) Pt will participate in MBSS to further assess swallow function.  - met  3) Pt will participate in trial dysphagia tx in attempt to improve strength/safety  of swallow .  4) Pt / family education regarding dysphagia and aspiration risk.     POC updated.   Emily P. Abadie M.S., CCC-SLP  Speech Language Pathologist  [phone]  02/04/2019

## 2019-02-04 NOTE — PROGRESS NOTES
[facility]  Hematology/Oncology  Progress Note    Patient Name: Ermelinda Verde  Admission Date: 1/22/2019  Hospital Length of Stay: 13 days  Code Status: Full Code     Subjective:     HPI:  Ms Verde is a 59 yo F with a prior diagnosis of L sided breast cancer, s/p 3 cycles of nab-paclitaxel and atezolizumab who presents from clinic with a roughly week long, multi-day history of proximal muscle weakness in the shoulder girdle muscles, bilateral and associated with mild tenderness. She reports generalized weakness, but strength impairment with the use of her upper extremities more than lower extremities, and her ADLs are intact. Her last PET scan in December 2018 showed almost complete resolution of her adenopathy, and she had been complaining of a rash, which had been attributed to Nab paclitaxel. Last week, 1/15, she had a CT neck/ since there was concern for facial swelling per symptoms, and the CT showed lymphadenopathy without airway or vascular compromise. 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 Most recently, the patient had been on 60mg of prednisone with a taper and had noted swelling, proximal weakness. She also notes some progressive swallowing difficulty, but attributes this to candidal thrush for which she takes nystatin scheduled. She reports poor PO intake preceding admission, dark, mario colored urine, but denies fevers or joint pain.      Onc History per Dr. Reyna:   She developed a palpable abnormality in her left breast in January 2017 which she noted on self-examination.  A diagnostic mammogram on January 19 showed a greater than 1 cm nodule in the upper outer portion of left breast.  By ultrasound this was lobulated and hypoechoic measuring 1.75 x 1.51 x 1.96 cm.     On January 24,  2017 a core needle biopsy was performed which showed infiltrating ductal carcinoma, high grade.  The tumor was ER negative, MD negative, and HER-2 negative.  A follow-up ultrasound on December 6 showed 2.5 x 2.2 x 1.5 cm left breast mass.  There was no abnormality noted in the left axilla.     She underwent sentinel lymph node biopsy on February 22.  That showed 4 negative lymph nodes.     She had 4 cycles of  Wilbur-adjuvantTaxotere and Cytoxan completed  on 5/9/17.     On June 26 she underwent left mastectomy.  That revealed 2 foci of invasive high-grade carcinoma measuring 14 mm and 1.5 mm.  Margins were negative.     She completed 4 cycles of adjuvant Adriamycin on September 12, 2017.     In October, she developed some left supraclavicular lymphadenopathy which turned out to be recurrence.     A fine-needle aspirate of the lymph node was performed on October 19th.  That showed metastatic carcinoma consistent with breast primary which was ER negative, MD negative and HER 2-negative.           Interval History:   NAEO, pt still w/ irritating productive cough, possibly exacerbated by NGT but feels weakness/myopathy slowly improving. Continues NPO on tube feeds, failed swallow study this morning. Plan for repeat Wednesday.     Oncology Treatment Plan:   OP BREAST DOCETAXEL Q3W    Medications:  Continuous Infusions:  Scheduled Meds:   amLODIPine  10 mg Per NG tube Daily    calcium-vitamin D3  1 tablet Oral BID    enoxaparin  40 mg Subcutaneous Daily    escitalopram oxalate  10 mg Per NG tube Daily    famotidine  20 mg Per NG tube BID    folic acid  1 mg Oral Daily    guaifenesin 100 mg/5 ml  200 mg Per NG tube Q4H    methylPREDNISolone sodium succinate  24 mg Intravenous Q12H     PRN Meds:acetaminophen, ALPRAZolam, dextrose 50%, dextrose 50%, glucagon (human recombinant), glucose, glucose, hepatitis B, hydrALAZINE, insulin aspart U-100, ondansetron, oxyCODONE, pneumoc 13-brandin conj-dip cr(PF), polyethylene glycol,  promethazine, sodium chloride 0.9%, sodium chloride 0.9%     Review of Systems   Constitutional: Positive for fatigue. Negative for activity change, appetite change, chills, diaphoresis, fever and unexpected weight change.   HENT: Positive for facial swelling and trouble swallowing. Negative for congestion, ear discharge, hearing loss, postnasal drip, sinus pressure, sneezing, sore throat and tinnitus.    Eyes: Negative for photophobia, pain and redness.   Respiratory: Negative for apnea, cough, choking, chest tightness, shortness of breath and stridor.    Cardiovascular: Negative for chest pain, palpitations and leg swelling.   Gastrointestinal: Negative for abdominal pain, anal bleeding, constipation, diarrhea, nausea and rectal pain.   Endocrine: Negative for polyuria.   Genitourinary: Negative for dysuria and hematuria.   Musculoskeletal: Negative for arthralgias, back pain, gait problem, joint swelling, myalgias, neck pain and neck stiffness.   Skin: Positive for rash. Negative for color change and pallor.   Allergic/Immunologic: Negative for immunocompromised state.   Neurological: Positive for weakness. Negative for dizziness, seizures and numbness.   Hematological: Positive for adenopathy. Does not bruise/bleed easily.   Psychiatric/Behavioral: Negative for agitation and behavioral problems.     Objective:     Vital Signs (Most Recent):  Temp: 98.4 °F (36.9 °C) (02/04/19 1227)  Pulse: 97 (02/04/19 1227)  Resp: 18 (02/04/19 1227)  BP: 118/62 (02/04/19 1227)  SpO2: 96 % (02/04/19 1227) Vital Signs (24h Range):  Temp:  [98.2 °F (36.8 °C)-99.3 °F (37.4 °C)] 98.4 °F (36.9 °C)  Pulse:  [] 97  Resp:  [18-20] 18  SpO2:  [96 %-100 %] 96 %  BP: (118-137)/(58-72) 118/62     Weight: 76.2 kg (168 lb 1.6 oz)  Body mass index is 27.97 kg/m².  Body surface area is 1.87 meters squared.      Intake/Output Summary (Last 24 hours) at 2/4/2019 1301  Last data filed at 2/4/2019 0745  Gross per 24 hour   Intake 838 ml    Output 1600 ml   Net -762 ml       Physical Exam   Constitutional: She is oriented to person, place, and time. She appears well-developed and well-nourished. No distress.   HENT:   Head: Atraumatic.   Nose: Nose normal.   Mouth/Throat: No oropharyngeal exudate.   Face appears swollen   Eyes: Conjunctivae and EOM are normal. Pupils are equal, round, and reactive to light. Right eye exhibits no discharge. Left eye exhibits no discharge. No scleral icterus.   Neck: Normal range of motion. Neck supple. No tracheal deviation present.   Cardiovascular: Normal rate, regular rhythm and intact distal pulses. Exam reveals no gallop and no friction rub.   Pulmonary/Chest: Effort normal and breath sounds normal. No respiratory distress. She has no wheezes. She has no rales. She exhibits no tenderness.   Abdominal: Soft. Bowel sounds are normal. She exhibits no distension and no mass. There is no tenderness. There is no rebound and no guarding.   Musculoskeletal: Normal range of motion. She exhibits edema (facial edema, upper extremity edema b/l, resolving). She exhibits no tenderness or deformity.   Neurological: She is alert and oriented to person, place, and time. No cranial nerve deficit or sensory deficit.     4.5/5 shoulder strength on shoulder abduction; improving  5/5 flexion and extension preserved at elbow   Skin: Skin is warm and dry. Capillary refill takes less than 2 seconds. No rash noted. She is not diaphoretic. No erythema. No pallor.   Psychiatric: She has a normal mood and affect.   Vitals reviewed.      Significant Labs:   BMP:   Recent Labs   Lab 02/03/19 0329 02/04/19  0406   * 140*   * 131*   K 4.5 4.4    98   CO2 28 29   BUN 23* 23*   CREATININE 0.6 0.6   CALCIUM 8.4* 8.6*   MG 1.9 1.8   , CBC:   Recent Labs   Lab 02/03/19 0329 02/04/19  0406   WBC 9.92 10.26   HGB 8.3* 8.2*   HCT 25.9* 26.2*   * 139*   , CMP:   Recent Labs   Lab 02/03/19 0329 02/04/19  0406   * 131*    K 4.5 4.4    98   CO2 28 29   * 140*   BUN 23* 23*   CREATININE 0.6 0.6   CALCIUM 8.4* 8.6*   PROT 7.1 7.0   ALBUMIN 2.3* 2.4*   BILITOT 0.3 0.3   ALKPHOS 71 82   AST 86* 88*   ALT 36 36   ANIONGAP 4* 4*   EGFRNONAA >60.0 >60.0   , Coagulation: No results for input(s): PT, INR, APTT in the last 48 hours., Haptoglobin:   Recent Labs   Lab 02/04/19  0406   HAPTOGLOBIN 153   , Immunology: No results for input(s): SPEP, ERVIN, DARCY, FREELAMBDALI in the last 48 hours., LDH: No results for input(s): LDHCSF, BFSOURCE in the last 48 hours., LFTs:   Recent Labs   Lab 02/03/19  0329 02/04/19  0406   ALT 36 36   AST 86* 88*   ALKPHOS 71 82   BILITOT 0.3 0.3   PROT 7.1 7.0   ALBUMIN 2.3* 2.4*   , Reticulocytes:   Recent Labs   Lab 02/04/19  0406   RETIC 2.6*   , Tumor Markers: No results for input(s): PSA, CEA, , AFPTM, [id number],  in the last 48 hours.    Invalid input(s): ALGTM, Uric Acid No results for input(s): URICACID in the last 48 hours., Urine Studies: No results for input(s): COLORU, APPEARANCEUA, PHUR, SPECGRAV, PROTEINUA, GLUCUA, KETONESU, BILIRUBINUA, OCCULTUA, NITRITE, UROBILINOGEN, LEUKOCYTESUR, RBCUA, WBCUA, BACTERIA, SQUAMEPITHEL, HYALINECASTS in the last 48 hours.    Invalid input(s): YANET,   Recent Lab Results       02/04/19  0406        Immature Granulocytes 0.8     Immature Grans (Abs) 0.08  Comment:  Mild elevation in immature granulocytes is non specific and   can be seen in a variety of conditions including stress response,   acute inflammation, trauma and pregnancy. Correlation with other   laboratory and clinical findings is essential.       Albumin 2.4     Alkaline Phosphatase 82     ALT 36     Anion Gap 4     AST 88     Baso # 0.00     Basophil% 0.0     Total Bilirubin 0.3  Comment:  For infants and newborns, interpretation of results should be based  on gestational age, weight and in agreement with clinical  observations.  Premature Infant recommended reference ranges:  Up to  24 hours.............<8.0 mg/dL  Up to 48 hours............<12.0 mg/dL  3-5 days..................<15.0 mg/dL  6-29 days.................<15.0 mg/dL       BUN, Bld 23     Calcium 8.6     Chloride 98     CO2 29          Creatinine 0.6     Differential Method Automated     eGFR if African American >60.0     eGFR if non  >60.0  Comment:  Calculation used to obtain the estimated glomerular filtration  rate (eGFR) is the CKD-EPI equation.        Eos # 0.0     Eosinophil% 0.0     Ferritin 641     Glucose 140     Gran # (ANC) 9.1     Gran% 88.5     Haptoglobin 153     Hematocrit 26.2     Hemoglobin 8.2     Iron 37       Comment:  Results are increased in hemolyzed samples.     Lymph # 0.5     Lymph% 4.9     Magnesium 1.8     MCH 27.2     MCHC 31.3     MCV 87     Mono # 0.6     Mono% 5.8     MPV 11.2     nRBC 0     Phosphorus 2.8     Platelets 139     Potassium 4.4     Total Protein 7.0     RBC 3.01     RDW 19.0     Retic 2.6     Saturated Iron 15     Sodium 131     TIBC 247     Transferrin 167      167     WBC 10.26        and All pertinent labs from the last 24 hours have been reviewed.    Diagnostic Results:  I have reviewed and interpreted all pertinent imaging results/findings within the past 24 hours.    Assessment/Plan:     * Myositis    - DDX dermatomyositis de haylie vs related to immunotherapy  - trend CPK daily; downtrending generally with a slight bump on 01/27  -restarted fluids 01/26 due to NPO  - myositis labs suspicious for myosits; rheum on board.   - F/u 1/24 skin biopsy. Pending results may need muscle biopsy by general surgery  -MRI left humerus suspicious for myositis    Per rheumatology  - Methylpred 125mg IV bid started, now tapered to 24mg IV bid  - MTX 15mg subQ qWeekly , next dose 2/5/19  - IVIG x5 days completed 2/3/19 will require 1x maintenance dose 4 weeks from 2/3 per rheum  - myomarker panel, HMGCR, QG-TB pending  - PFTs as OP  - Quantitative IgA 533, IgG 932, IgM  66  - ID consulted for fast track vacc  - f/u with Dr. Swift and Dr. Campos in Rheumatology at discharge  - Currently tapering steroids per rheumatology, transition to PO medrol 48mg daily once swallow study passed    Lab Results   Component Value Date     (H) 02/04/2019     (H) 02/03/2019     (H) 02/02/2019     (H) 02/01/2019    CPK 1147 (H) 01/31/2019          Hypokalemia    Replete prn     Dysphagia    Patient is experiencing difficulty swallowing that has been increasing in severity over last couple of days to the point where patient did not feel as if she could swallow.  As of 01/26, SLP evaluated patient and determined that she should be NPO except for medications until a modified barium swallow could be performed. Possibly candidal esophagitis; patient has oral thrush.    Plan:  - High aspiration risk on modified barium study, strict NPO  - NGT in situ, tube feeds initiated per dietary consult  - Switched to IV meds where possible  - Fluconazole IVPB 200mg q24hr (now d/c'd)  - GI consulted, EGD resulted w/ Grade C erosive esophagitis, no candidal infxn identified. Bx obtained, GI recs start PPI bid  - Failed repeat swallow trial for NGT removal (2/4) SLP plans swallowing exercises, continue current steroid and immunosuppressive therapies, will repeat study on Wednesday.   - Currently famotidine 20 daily per NGT, when PO intake resumed switch to PPI PO bid per GI recs for esophagitis/ulcerations.     Swelling of upper arm    -B/L U/S negative for DVTs  -appears less edematous than day prior  -continue to monitor     Dermatitis    - Skin biopsy from 1/24 pending, appreciate dermatology recs     Candidiasis    Oral thrush  -cont Nystatin swish and swallow. Additionally, added fluconaozle 200 mg Po qday on 01/26 as possibility patient is experiencing esophagitis 2/2 to candida  -appears resolved now s/p fluconazole and nystatin     Rhabdomyolysis    -see myositis. Continue supportive  care, Cr/ electrolyte/ CPK monitoring and aggressive hydration. transaminitis on CMP is likely due to skeletal muscle rhabdo, not hepatic     Drug rash    - Unclear if symptoms related to chemotherapy     Pre-diabetes    -check a1c, 0-5u SSI while on steroids, add prandial insulin as warranted.      Adjustment disorder with depressed mood    -cont home SSRI     Vitamin B12 deficiency    -cont home B12 supplementation      Breast cancer of upper-outer quadrant of left female breast    -On January 24, 2017 a core needle biopsy was performed which showed infiltrating ductal carcinoma, high grade.  The tumor was ER negative, SC negative, and HER-2 negative.  -She had 4 cycles of  Wilbur-adjuvantTaxotere and Cytoxan completed  on 5/9/17.  -On June 26 she underwent left mastectomy.  That revealed 2 foci of invasive high-grade carcinoma measuring 14 mm and 1.5 mm.  Margins were negative.  -She completed 4 cycles of adjuvant Adriamycin on September 12, 2017.  -  A fine-needle aspirate of the lymph node was performed on October 19th.  That showed metastatic carcinoma consistent with breast primary which was ER negative, SC negative and HER 2-negative.    -Per rheumatology: hold paclitaxel agent and atezolizumb at this time - both can cause myositis      Hypertension    -on home amlodipine-benazepril; will hold ACEi for now and observe renal function with CPK elevation in rhabdo.     Plan:  - Amlodipine 10 mg qday on admission  - 1/28 Pt now strict NPO due to failed swallow study, now on hydralazine 10mg IV q8h WCTM and adjust PRN              Noah Patel MD  Hematology/Oncology  [facility]      I have reviewed the notes, assessments, and/or procedures performed by the housestaff, as above.  I have personally interviewed and examined the patient at the beside, and rounded with the housestaff. I concur with her/his assessment and plan and the documentation of Ermelinda Verde.  I, Dr. Conrad Cheung,  personally spent more than  35 mins during this encounter, greater than 50% was spent in direct counseling and/or coordination of care.     Conrad Cheung M.D., M.S., F.A.C.P.  Hematology/Oncology Attending  [facility]

## 2019-02-04 NOTE — PT/OT/SLP PROGRESS
"Physical Therapy Treatment    Patient Name:  Ermelinda Verde   MRN:  [id number]    Recommendations:     Discharge Recommendations:  (HHPT) - with possibility to progress to outpatient PT services  Discharge Equipment Recommendations: walker, rolling   Barriers to discharge: None    Assessment:     Ermelinda Verde is a 58 y.o. female admitted with a medical diagnosis of Myositis.  She presents with the following impairments/functional limitations:  weakness, impaired cardiopulmonary response to activity, gait instability, impaired balance, impaired endurance, decreased safety awareness.    Pt demonstrated improvements in safety with amb.  Able to amb a full 7 minutes without demonstrating need for A or balance deficits.  Verbal cuing only regarding kn ext in midstance.  Increased tolerance to standing therex.  Pt and spouse are highly motivated and compliant with HEP and walking program.     Rehab Prognosis: Good; patient would benefit from acute skilled PT services to address these deficits and reach maximum level of function.    Recent Surgery: Procedure(s) (LRB):  EGD (ESOPHAGOGASTRODUODENOSCOPY) (N/A) 6 Days Post-Op    Plan:     During this hospitalization, patient to be seen 3 x/week to address the identified rehab impairments via gait training, therapeutic activities, therapeutic exercises, neuromuscular re-education and progress toward the following goals:    · Plan of Care Expires:  02/22/19    Subjective     Chief Complaint: NPO  Patient/Family Comments/goals: To go home  Pain/Comfort:  · Pain Rating 1: 0/10      Objective:     Communicated with nursing prior to session.  Patient found all lines intact and call button in reach NG tube(port)  upon PT entry to room.     "I just walked with my "    General Precautions: Standard, aspiration, NPO, fall   Orthopedic Precautions:N/A   Braces: N/A     Functional Mobility:  · Bed Mobility:     · Scooting: independence  · Supine to Sit: independence, with HOB " elevated by 30 deg    · Transfers:     · Sit to Stand:  independence with no AD  · Bed to Chair: independence with  no AD  using  Stand Pivot    · Gait: Pt amb 590', S, RW, no episodes of LOB, no sway noted, improvements in kn stability with midstance    · Balance: S: dynamic standing balance with AD      AM-PAC 6 CLICK MOBILITY  Turning over in bed (including adjusting bedclothes, sheets and blankets)?: 4  Sitting down on and standing up from a chair with arms (e.g., wheelchair, bedside commode, etc.): 4  Moving from lying on back to sitting on the side of the bed?: 4  Moving to and from a bed to a chair (including a wheelchair)?: 4  Need to walk in hospital room?: 3  Climbing 3-5 steps with a railing?: 3  Basic Mobility Total Score: 22       Therapeutic Activities and Exercises:   Whiteboard updated  Squats: 7 reps x 2 sets, in stance, with RW  Marching: hip flex, 10 reps x 2 sets, in stance, with RW  Heel raises: 20 reps, in stance, with RW  Hip abd: R 16 reps, L 15 reps, in stance, with RW  6MWT: Pt amb 484' = 147.52m, 4/10 RPE following  Gait speed: 0.41 m/s    Patient left up in chair with all lines intact, call button in reach and spouse present.    GOALS:   Multidisciplinary Problems     Physical Therapy Goals        Problem: Physical Therapy Goal    Goal Priority Disciplines Outcome Goal Variances Interventions   Physical Therapy Goal     PT, PT/OT Ongoing (interventions implemented as appropriate)     Description:  Goals to be met by: 19     Patient will increase functional independence with mobility by performin. Gait  x 500 feet with Supervision without device. - GOAL MET    2. Increased functional strength to 5/5 for BLE.  3. Standing lower extremity exercise program x 15 reps per handout, with supervision.  4. Pt to perf 6MWT: amb 580', to demonstrate a clinically significant improvement in aerobic capacity.                      Time Tracking:     PT Received On: 19  PT Start Time:  1300     PT Stop Time: 1329  PT Total Time (min): 29 min     Billable Minutes: Gait Training 13 and Therapeutic Exercise 16    Treatment Type: Treatment  PT/PTA: PT           Lotus Camacho, PT  02/04/2019

## 2019-02-04 NOTE — PT/OT/SLP PROGRESS
Speech Language Pathology Treatment    Patient Name:  Ermelinda Verde   MRN:  [id number]  Admitting Diagnosis: Myositis    Recommendations:                 General Recommendations:  Dysphagia therapy and Modified barium swallow study (Wednesday) to assess progress / appropriateness of diet implementation vs alternative means.   Diet recommendations:  NPO, Liquid Diet Level: NPO   Aspiration Precautions: Continue alternate means of nutrition and Strict aspiration precautions   General Precautions: Standard, aspiration, fall, NPO  Communication strategies:  none    Subjective     Pt awake;alert. Spouse present.     Pain/Comfort:  · Pain Rating 1: 0/10    Objective:     Has the patient been evaluated by SLP for swallowing?   Yes  Keep patient NPO? Yes   Current Respiratory Status: room air      Patient seen for ongoing dysphagia therapy. Team paged SLP prior to session regarding assessment of PO implementation and desire to remove NG tube. Patient with overt and immediate coughing/choking on all trials this date (thin liquids via ice chip x1, puree via 1/2 tsp x1). Patient is not appropriate for diet implementation at this time. All the above results and recommendations were discussed with NSG and Team who are in agreement with plan. Per Team discussion with SLP, patient to participate in a MBSS 2/6 to determine possible progress/appropriatness of PO diet implementation vs alternative means.   Whiteboard updated.    Assessment:     Ermelinda Verde is a 58 y.o. female with an SLP diagnosis of Dysphagia.      Goals:   Multidisciplinary Problems     SLP Goals        Problem: SLP Goal    Goal Priority Disciplines Outcome   SLP Goal     SLP Ongoing (interventions implemented as appropriate)   Description:  Speech-Language Pathology Goals  Goals expected to be met by 2/2 - Goals remain appropriate for 2/11  1) Pt will participate in ongoing swallow assessment to determine appropriateness of po diet.   2) Pt will participate  in MBSS to further assess swallow function.  - met  3) Pt will participate in trial dysphagia tx in attempt to improve strength/safety  of swallow .  4) Pt / family education regarding dysphagia and aspiration risk.                      Plan:     · Patient to be seen:  4 x/week   · Plan of Care expires:  02/24/19  · Plan of Care reviewed with:  patient   · SLP Follow-Up:  Yes       Discharge recommendations:  (TBD)   Barriers to Discharge:  None    Time Tracking:     SLP Treatment Date:   02/04/19  Speech Start Time:  0930  Speech Stop Time:  0946     Speech Total Time (min):  16 min    Billable Minutes: Treatment Swallowing Dysfunction 8 and Seld Care/Home Management Training 8    Emily Abadie, CCC-SLP  02/04/2019

## 2019-02-04 NOTE — ASSESSMENT & PLAN NOTE
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   - F/u 1/24 skin biopsy. Pending results may need muscle biopsy by general surgery  -MRI left humerus suspicious for myositis    Per rheumatology  - Methylpred 125mg IV bid started, now tapered to 24mg IV bid  - MTX 15mg subQ qWeekly , next dose 2/5/19  - IVIG x5 days completed 2/3/19 will require 1x maintenance dose 4 weeks from 2/3 per rheum  - myomarker panel, HMGCR, QG-TB pending  - PFTs as OP  - Quantitative IgA 533, IgG 932, IgM 66  - ID consulted for fast track vacc  - f/u with Dr. Swift and Dr. Campos in Rheumatology at discharge  - Currently tapering steroids per rheumatology, transition to PO medrol 48mg daily once swallow study passed    Lab Results   Component Value Date     (H) 02/04/2019     (H) 02/03/2019     (H) 02/02/2019     (H) 02/01/2019    CPK 1147 (H) 01/31/2019

## 2019-02-04 NOTE — SUBJECTIVE & OBJECTIVE
Interval History:   NAEO, pt still w/ irritating productive cough, possibly exacerbated by NGT but feels weakness/myopathy slowly improving. Continues NPO on tube feeds, failed swallow study this morning. Plan for repeat Wednesday.     Oncology Treatment Plan:   OP BREAST DOCETAXEL Q3W    Medications:  Continuous Infusions:  Scheduled Meds:   amLODIPine  10 mg Per NG tube Daily    calcium-vitamin D3  1 tablet Oral BID    enoxaparin  40 mg Subcutaneous Daily    escitalopram oxalate  10 mg Per NG tube Daily    famotidine  20 mg Per NG tube BID    folic acid  1 mg Oral Daily    guaifenesin 100 mg/5 ml  200 mg Per NG tube Q4H    methylPREDNISolone sodium succinate  24 mg Intravenous Q12H     PRN Meds:acetaminophen, ALPRAZolam, dextrose 50%, dextrose 50%, glucagon (human recombinant), glucose, glucose, hepatitis B, hydrALAZINE, insulin aspart U-100, ondansetron, oxyCODONE, pneumoc 13-brandin conj-dip cr(PF), polyethylene glycol, promethazine, sodium chloride 0.9%, sodium chloride 0.9%     Review of Systems   Constitutional: Positive for fatigue. Negative for activity change, appetite change, chills, diaphoresis, fever and unexpected weight change.   HENT: Positive for facial swelling and trouble swallowing. Negative for congestion, ear discharge, hearing loss, postnasal drip, sinus pressure, sneezing, sore throat and tinnitus.    Eyes: Negative for photophobia, pain and redness.   Respiratory: Negative for apnea, cough, choking, chest tightness, shortness of breath and stridor.    Cardiovascular: Negative for chest pain, palpitations and leg swelling.   Gastrointestinal: Negative for abdominal pain, anal bleeding, constipation, diarrhea, nausea and rectal pain.   Endocrine: Negative for polyuria.   Genitourinary: Negative for dysuria and hematuria.   Musculoskeletal: Negative for arthralgias, back pain, gait problem, joint swelling, myalgias, neck pain and neck stiffness.   Skin: Positive for rash. Negative for color  change and pallor.   Allergic/Immunologic: Negative for immunocompromised state.   Neurological: Positive for weakness. Negative for dizziness, seizures and numbness.   Hematological: Positive for adenopathy. Does not bruise/bleed easily.   Psychiatric/Behavioral: Negative for agitation and behavioral problems.     Objective:     Vital Signs (Most Recent):  Temp: 98.4 °F (36.9 °C) (02/04/19 1227)  Pulse: 97 (02/04/19 1227)  Resp: 18 (02/04/19 1227)  BP: 118/62 (02/04/19 1227)  SpO2: 96 % (02/04/19 1227) Vital Signs (24h Range):  Temp:  [98.2 °F (36.8 °C)-99.3 °F (37.4 °C)] 98.4 °F (36.9 °C)  Pulse:  [] 97  Resp:  [18-20] 18  SpO2:  [96 %-100 %] 96 %  BP: (118-137)/(58-72) 118/62     Weight: 76.2 kg (168 lb 1.6 oz)  Body mass index is 27.97 kg/m².  Body surface area is 1.87 meters squared.      Intake/Output Summary (Last 24 hours) at 2/4/2019 1301  Last data filed at 2/4/2019 0745  Gross per 24 hour   Intake 838 ml   Output 1600 ml   Net -762 ml       Physical Exam   Constitutional: She is oriented to person, place, and time. She appears well-developed and well-nourished. No distress.   HENT:   Head: Atraumatic.   Nose: Nose normal.   Mouth/Throat: No oropharyngeal exudate.   Face appears swollen   Eyes: Conjunctivae and EOM are normal. Pupils are equal, round, and reactive to light. Right eye exhibits no discharge. Left eye exhibits no discharge. No scleral icterus.   Neck: Normal range of motion. Neck supple. No tracheal deviation present.   Cardiovascular: Normal rate, regular rhythm and intact distal pulses. Exam reveals no gallop and no friction rub.   Pulmonary/Chest: Effort normal and breath sounds normal. No respiratory distress. She has no wheezes. She has no rales. She exhibits no tenderness.   Abdominal: Soft. Bowel sounds are normal. She exhibits no distension and no mass. There is no tenderness. There is no rebound and no guarding.   Musculoskeletal: Normal range of motion. She exhibits edema  (facial edema, upper extremity edema b/l, resolving). She exhibits no tenderness or deformity.   Neurological: She is alert and oriented to person, place, and time. No cranial nerve deficit or sensory deficit.     4.5/5 shoulder strength on shoulder abduction; improving  5/5 flexion and extension preserved at elbow   Skin: Skin is warm and dry. Capillary refill takes less than 2 seconds. No rash noted. She is not diaphoretic. No erythema. No pallor.   Psychiatric: She has a normal mood and affect.   Vitals reviewed.      Significant Labs:   BMP:   Recent Labs   Lab 02/03/19 0329 02/04/19  0406   * 140*   * 131*   K 4.5 4.4    98   CO2 28 29   BUN 23* 23*   CREATININE 0.6 0.6   CALCIUM 8.4* 8.6*   MG 1.9 1.8   , CBC:   Recent Labs   Lab 02/03/19 0329 02/04/19 0406   WBC 9.92 10.26   HGB 8.3* 8.2*   HCT 25.9* 26.2*   * 139*   , CMP:   Recent Labs   Lab 02/03/19 0329 02/04/19  0406   * 131*   K 4.5 4.4    98   CO2 28 29   * 140*   BUN 23* 23*   CREATININE 0.6 0.6   CALCIUM 8.4* 8.6*   PROT 7.1 7.0   ALBUMIN 2.3* 2.4*   BILITOT 0.3 0.3   ALKPHOS 71 82   AST 86* 88*   ALT 36 36   ANIONGAP 4* 4*   EGFRNONAA >60.0 >60.0   , Coagulation: No results for input(s): PT, INR, APTT in the last 48 hours., Haptoglobin:   Recent Labs   Lab 02/04/19 0406   HAPTOGLOBIN 153   , Immunology: No results for input(s): SPEP, ERVIN, DARCY, FREELAMBDALI in the last 48 hours., LDH: No results for input(s): LDHCSF, BFSOURCE in the last 48 hours., LFTs:   Recent Labs   Lab 02/03/19 0329 02/04/19 0406   ALT 36 36   AST 86* 88*   ALKPHOS 71 82   BILITOT 0.3 0.3   PROT 7.1 7.0   ALBUMIN 2.3* 2.4*   , Reticulocytes:   Recent Labs   Lab 02/04/19  0406   RETIC 2.6*   , Tumor Markers: No results for input(s): PSA, CEA, , AFPTM, [id number],  in the last 48 hours.    Invalid input(s): ALGTM, Uric Acid No results for input(s): URICACID in the last 48 hours., Urine Studies: No results for input(s):  COLORU, APPEARANCEUA, PHUR, SPECGRAV, PROTEINUA, GLUCUA, KETONESU, BILIRUBINUA, OCCULTUA, NITRITE, UROBILINOGEN, LEUKOCYTESUR, RBCUA, WBCUA, BACTERIA, SQUAMEPITHEL, HYALINECASTS in the last 48 hours.    Invalid input(s): YANET,   Recent Lab Results       02/04/19  0406        Immature Granulocytes 0.8     Immature Grans (Abs) 0.08  Comment:  Mild elevation in immature granulocytes is non specific and   can be seen in a variety of conditions including stress response,   acute inflammation, trauma and pregnancy. Correlation with other   laboratory and clinical findings is essential.       Albumin 2.4     Alkaline Phosphatase 82     ALT 36     Anion Gap 4     AST 88     Baso # 0.00     Basophil% 0.0     Total Bilirubin 0.3  Comment:  For infants and newborns, interpretation of results should be based  on gestational age, weight and in agreement with clinical  observations.  Premature Infant recommended reference ranges:  Up to 24 hours.............<8.0 mg/dL  Up to 48 hours............<12.0 mg/dL  3-5 days..................<15.0 mg/dL  6-29 days.................<15.0 mg/dL       BUN, Bld 23     Calcium 8.6     Chloride 98     CO2 29          Creatinine 0.6     Differential Method Automated     eGFR if African American >60.0     eGFR if non  >60.0  Comment:  Calculation used to obtain the estimated glomerular filtration  rate (eGFR) is the CKD-EPI equation.        Eos # 0.0     Eosinophil% 0.0     Ferritin 641     Glucose 140     Gran # (ANC) 9.1     Gran% 88.5     Haptoglobin 153     Hematocrit 26.2     Hemoglobin 8.2     Iron 37       Comment:  Results are increased in hemolyzed samples.     Lymph # 0.5     Lymph% 4.9     Magnesium 1.8     MCH 27.2     MCHC 31.3     MCV 87     Mono # 0.6     Mono% 5.8     MPV 11.2     nRBC 0     Phosphorus 2.8     Platelets 139     Potassium 4.4     Total Protein 7.0     RBC 3.01     RDW 19.0     Retic 2.6     Saturated Iron 15     Sodium 131     TIBC  247     Transferrin 167      167     WBC 10.26        and All pertinent labs from the last 24 hours have been reviewed.    Diagnostic Results:  I have reviewed and interpreted all pertinent imaging results/findings within the past 24 hours.

## 2019-02-04 NOTE — PLAN OF CARE
MDRs completed with Dr. Cheung and the team. Patient failed bedside swallow eval today with speech. Plans to keep NGT for now with tube feeds. MD to follow up with speech regarding future recs with swallowing (NGT vs PEG). Plans to continue steroid therapy per Rheumatology recs. MTX tomorrow vs Wednesday. VSS. T max 99.3 today. Labs stable. CM to continue to follow with the team.     Nicole Carter, RN, BSN, CM  [facility]  Nurse - Med Onc/Gyn Onc  [phone]

## 2019-02-05 PROBLEM — E44.0 MODERATE MALNUTRITION: Status: ACTIVE | Noted: 2019-02-05

## 2019-02-05 LAB
ALBUMIN SERPL BCP-MCNC: 2.5 G/DL
ALP SERPL-CCNC: 78 U/L
ALT SERPL W/O P-5'-P-CCNC: 33 U/L
ANION GAP SERPL CALC-SCNC: 5 MMOL/L
AST SERPL-CCNC: 87 U/L
BASOPHILS # BLD AUTO: 0 K/UL
BASOPHILS NFR BLD: 0 %
BILIRUB SERPL-MCNC: 0.3 MG/DL
BUN SERPL-MCNC: 24 MG/DL
CALCIUM SERPL-MCNC: 8.5 MG/DL
CHLORIDE SERPL-SCNC: 97 MMOL/L
CK SERPL-CCNC: 434 U/L
CO2 SERPL-SCNC: 29 MMOL/L
CREAT SERPL-MCNC: 0.5 MG/DL
DIFFERENTIAL METHOD: ABNORMAL
EOSINOPHIL # BLD AUTO: 0 K/UL
EOSINOPHIL NFR BLD: 0 %
ERYTHROCYTE [DISTWIDTH] IN BLOOD BY AUTOMATED COUNT: 19.3 %
EST. GFR  (AFRICAN AMERICAN): >60 ML/MIN/1.73 M^2
EST. GFR  (NON AFRICAN AMERICAN): >60 ML/MIN/1.73 M^2
GLUCOSE SERPL-MCNC: 115 MG/DL
HCT VFR BLD AUTO: 25.1 %
HGB BLD-MCNC: 7.9 G/DL
IMM GRANULOCYTES # BLD AUTO: 0.08 K/UL
IMM GRANULOCYTES NFR BLD AUTO: 0.8 %
LYMPHOCYTES # BLD AUTO: 0.8 K/UL
LYMPHOCYTES NFR BLD: 7.7 %
M TB IFN-G CD4+ BCKGRND COR BLD-ACNC: 0 IU/ML
MAGNESIUM SERPL-MCNC: 1.9 MG/DL
MCH RBC QN AUTO: 27.2 PG
MCHC RBC AUTO-ENTMCNC: 31.5 G/DL
MCV RBC AUTO: 87 FL
MITOGEN IGNF BCKGRD COR BLD-ACNC: 0.08 IU/ML
MITOGEN IGNF BCKGRD COR BLD-ACNC: ABNORMAL [IU]/ML
MONOCYTES # BLD AUTO: 0.8 K/UL
MONOCYTES NFR BLD: 7.7 %
NEUTROPHILS # BLD AUTO: 8.7 K/UL
NEUTROPHILS NFR BLD: 83.8 %
NIL: 0.01 IU/ML
NRBC BLD-RTO: 0 /100 WBC
PHOSPHATE SERPL-MCNC: 2.9 MG/DL
PLATELET # BLD AUTO: 152 K/UL
PMV BLD AUTO: 11.1 FL
POTASSIUM SERPL-SCNC: 4.2 MMOL/L
PROT SERPL-MCNC: 6.6 G/DL
RBC # BLD AUTO: 2.9 M/UL
SODIUM SERPL-SCNC: 131 MMOL/L
TB2 - NIL: 0 IU/ML
WBC # BLD AUTO: 10.35 K/UL

## 2019-02-05 PROCEDURE — 63600175 PHARM REV CODE 636 W HCPCS: Performed by: INTERNAL MEDICINE

## 2019-02-05 PROCEDURE — 99233 SBSQ HOSP IP/OBS HIGH 50: CPT | Mod: ,,, | Performed by: INTERNAL MEDICINE

## 2019-02-05 PROCEDURE — 99231 SBSQ HOSP IP/OBS SF/LOW 25: CPT | Mod: ,,, | Performed by: INTERNAL MEDICINE

## 2019-02-05 PROCEDURE — 25000003 PHARM REV CODE 250: Performed by: INTERNAL MEDICINE

## 2019-02-05 PROCEDURE — 84100 ASSAY OF PHOSPHORUS: CPT

## 2019-02-05 PROCEDURE — 25000003 PHARM REV CODE 250: Performed by: STUDENT IN AN ORGANIZED HEALTH CARE EDUCATION/TRAINING PROGRAM

## 2019-02-05 PROCEDURE — 99233 PR SUBSEQUENT HOSPITAL CARE,LEVL III: ICD-10-PCS | Mod: ,,, | Performed by: INTERNAL MEDICINE

## 2019-02-05 PROCEDURE — 85025 COMPLETE CBC W/AUTO DIFF WBC: CPT

## 2019-02-05 PROCEDURE — 83735 ASSAY OF MAGNESIUM: CPT

## 2019-02-05 PROCEDURE — 20600001 HC STEP DOWN PRIVATE ROOM

## 2019-02-05 PROCEDURE — 84238 ASSAY NONENDOCRINE RECEPTOR: CPT

## 2019-02-05 PROCEDURE — 80053 COMPREHEN METABOLIC PANEL: CPT

## 2019-02-05 PROCEDURE — 97535 SELF CARE MNGMENT TRAINING: CPT

## 2019-02-05 PROCEDURE — 92526 ORAL FUNCTION THERAPY: CPT

## 2019-02-05 PROCEDURE — 82550 ASSAY OF CK (CPK): CPT

## 2019-02-05 PROCEDURE — 99231 PR SUBSEQUENT HOSPITAL CARE,LEVL I: ICD-10-PCS | Mod: ,,, | Performed by: INTERNAL MEDICINE

## 2019-02-05 PROCEDURE — 82085 ASSAY OF ALDOLASE: CPT

## 2019-02-05 RX ADMIN — FOLIC ACID 1 MG: 1 TABLET ORAL at 10:02

## 2019-02-05 RX ADMIN — METHYLPREDNISOLONE SODIUM SUCCINATE 24 MG: 125 INJECTION, POWDER, FOR SOLUTION INTRAMUSCULAR; INTRAVENOUS at 09:02

## 2019-02-05 RX ADMIN — GUAIFENESIN 200 MG: 200 SOLUTION ORAL at 02:02

## 2019-02-05 RX ADMIN — FAMOTIDINE 20 MG: 20 TABLET ORAL at 10:02

## 2019-02-05 RX ADMIN — GUAIFENESIN 200 MG: 200 SOLUTION ORAL at 09:02

## 2019-02-05 RX ADMIN — ENOXAPARIN SODIUM 40 MG: 100 INJECTION SUBCUTANEOUS at 04:02

## 2019-02-05 RX ADMIN — GUAIFENESIN 200 MG: 200 SOLUTION ORAL at 03:02

## 2019-02-05 RX ADMIN — OYSTER SHELL CALCIUM WITH VITAMIN D 1 TABLET: 500; 200 TABLET, FILM COATED ORAL at 10:02

## 2019-02-05 RX ADMIN — GUAIFENESIN 200 MG: 200 SOLUTION ORAL at 10:02

## 2019-02-05 RX ADMIN — ESCITALOPRAM OXALATE 10 MG: 5 TABLET, FILM COATED ORAL at 10:02

## 2019-02-05 RX ADMIN — METHYLPREDNISOLONE SODIUM SUCCINATE 24 MG: 125 INJECTION, POWDER, FOR SOLUTION INTRAMUSCULAR; INTRAVENOUS at 10:02

## 2019-02-05 RX ADMIN — OYSTER SHELL CALCIUM WITH VITAMIN D 1 TABLET: 500; 200 TABLET, FILM COATED ORAL at 09:02

## 2019-02-05 RX ADMIN — AMLODIPINE BESYLATE 10 MG: 10 TABLET ORAL at 10:02

## 2019-02-05 RX ADMIN — FAMOTIDINE 20 MG: 20 TABLET ORAL at 09:02

## 2019-02-05 RX ADMIN — GUAIFENESIN 200 MG: 200 SOLUTION ORAL at 05:02

## 2019-02-05 NOTE — PROGRESS NOTES
[facility]  Hematology/Oncology  Progress Note    Patient Name: Ermelinda Verde  Admission Date: 1/22/2019  Hospital Length of Stay: 14 days  Code Status: Full Code     Subjective:     HPI:  Ms Verde is a 57 yo F with a prior diagnosis of L sided breast cancer, s/p 3 cycles of nab-paclitaxel and atezolizumab who presents from clinic with a roughly week long, multi-day history of proximal muscle weakness in the shoulder girdle muscles, bilateral and associated with mild tenderness. She reports generalized weakness, but strength impairment with the use of her upper extremities more than lower extremities, and her ADLs are intact. Her last PET scan in December 2018 showed almost complete resolution of her adenopathy, and she had been complaining of a rash, which had been attributed to Nab paclitaxel. Last week, 1/15, she had a CT neck/ since there was concern for facial swelling per symptoms, and the CT showed lymphadenopathy without airway or vascular compromise. 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 Most recently, the patient had been on 60mg of prednisone with a taper and had noted swelling, proximal weakness. She also notes some progressive swallowing difficulty, but attributes this to candidal thrush for which she takes nystatin scheduled. She reports poor PO intake preceding admission, dark, mario colored urine, but denies fevers or joint pain.      Onc History per Dr. Reyna:   She developed a palpable abnormality in her left breast in January 2017 which she noted on self-examination.  A diagnostic mammogram on January 19 showed a greater than 1 cm nodule in the upper outer portion of left breast.  By ultrasound this was lobulated and hypoechoic measuring 1.75 x 1.51 x 1.96 cm.     On January 24,  2017 a core needle biopsy was performed which showed infiltrating ductal carcinoma, high grade.  The tumor was ER negative, NM negative, and HER-2 negative.  A follow-up ultrasound on December 6 showed 2.5 x 2.2 x 1.5 cm left breast mass.  There was no abnormality noted in the left axilla.     She underwent sentinel lymph node biopsy on February 22.  That showed 4 negative lymph nodes.     She had 4 cycles of  Wilbur-adjuvantTaxotere and Cytoxan completed  on 5/9/17.     On June 26 she underwent left mastectomy.  That revealed 2 foci of invasive high-grade carcinoma measuring 14 mm and 1.5 mm.  Margins were negative.     She completed 4 cycles of adjuvant Adriamycin on September 12, 2017.     In October, she developed some left supraclavicular lymphadenopathy which turned out to be recurrence.     A fine-needle aspirate of the lymph node was performed on October 19th.  That showed metastatic carcinoma consistent with breast primary which was ER negative, NM negative and HER 2-negative.           Interval History:   NAEO, pt cough is improving. Still difficulties swalling, but working with SLP exercises. Dispo pending repeat swallow study 2/6. Next dose MTX today    Oncology Treatment Plan:   OP BREAST DOCETAXEL Q3W    Medications:  Continuous Infusions:  Scheduled Meds:   amLODIPine  10 mg Per NG tube Daily    calcium-vitamin D3  1 tablet Oral BID    enoxaparin  40 mg Subcutaneous Daily    escitalopram oxalate  10 mg Per NG tube Daily    famotidine  20 mg Per NG tube BID    folic acid  1 mg Oral Daily    guaifenesin 100 mg/5 ml  200 mg Per NG tube Q4H    methylPREDNISolone sodium succinate  24 mg Intravenous Q12H     PRN Meds:acetaminophen, ALPRAZolam, dextrose 50%, dextrose 50%, glucagon (human recombinant), glucose, glucose, hepatitis B, hydrALAZINE, insulin aspart U-100, ondansetron, oxyCODONE, pneumoc 13-brandin conj-dip cr(PF), polyethylene glycol, promethazine, sodium chloride, sodium chloride 0.9%, sodium  chloride 0.9%     Review of Systems   Constitutional: Positive for fatigue. Negative for activity change, appetite change, chills, diaphoresis, fever and unexpected weight change.   HENT: Positive for facial swelling and trouble swallowing. Negative for congestion, ear discharge, hearing loss, postnasal drip, sinus pressure, sneezing, sore throat and tinnitus.    Eyes: Negative for photophobia, pain and redness.   Respiratory: Negative for apnea, cough, choking, chest tightness, shortness of breath and stridor.    Cardiovascular: Negative for chest pain, palpitations and leg swelling.   Gastrointestinal: Negative for abdominal pain, anal bleeding, constipation, diarrhea, nausea and rectal pain.   Endocrine: Negative for polyuria.   Genitourinary: Negative for dysuria and hematuria.   Musculoskeletal: Negative for arthralgias, back pain, gait problem, joint swelling, myalgias, neck pain and neck stiffness.   Skin: Positive for rash. Negative for color change and pallor.   Allergic/Immunologic: Negative for immunocompromised state.   Neurological: Positive for weakness. Negative for dizziness, seizures and numbness.   Hematological: Positive for adenopathy. Does not bruise/bleed easily.   Psychiatric/Behavioral: Negative for agitation and behavioral problems.     Objective:     Vital Signs (Most Recent):  Temp: 98.7 °F (37.1 °C) (02/05/19 0406)  Pulse: 98 (02/05/19 0406)  Resp: 18 (02/05/19 0406)  BP: (!) 119/58 (02/05/19 0406)  SpO2: 95 % (02/05/19 0406) Vital Signs (24h Range):  Temp:  [98.4 °F (36.9 °C)-99.2 °F (37.3 °C)] 98.7 °F (37.1 °C)  Pulse:  [] 98  Resp:  [17-18] 18  SpO2:  [95 %-100 %] 95 %  BP: (109-137)/(58-69) 119/58     Weight: 75.5 kg (166 lb 7.2 oz)  Body mass index is 27.7 kg/m².  Body surface area is 1.86 meters squared.      Intake/Output Summary (Last 24 hours) at 2/5/2019 0818  Last data filed at 2/5/2019 0540  Gross per 24 hour   Intake 1625 ml   Output 600 ml   Net 1025 ml       Physical  Exam   Constitutional: She is oriented to person, place, and time. She appears well-developed and well-nourished. No distress.   HENT:   Head: Atraumatic.   Nose: Nose normal.   Mouth/Throat: No oropharyngeal exudate.   Face appears swollen   Eyes: Conjunctivae and EOM are normal. Pupils are equal, round, and reactive to light. Right eye exhibits no discharge. Left eye exhibits no discharge. No scleral icterus.   Neck: Normal range of motion. Neck supple. No tracheal deviation present.   Cardiovascular: Normal rate, regular rhythm and intact distal pulses. Exam reveals no gallop and no friction rub.   Pulmonary/Chest: Effort normal and breath sounds normal. No respiratory distress. She has no wheezes. She has no rales. She exhibits no tenderness.   Abdominal: Soft. Bowel sounds are normal. She exhibits no distension and no mass. There is no tenderness. There is no rebound and no guarding.   Musculoskeletal: Normal range of motion. She exhibits edema (facial edema, upper extremity edema b/l, resolving). She exhibits no tenderness or deformity.   Neurological: She is alert and oriented to person, place, and time. No cranial nerve deficit or sensory deficit.     4.5/5 shoulder strength on shoulder abduction; improving  5/5 flexion and extension preserved at elbow   Skin: Skin is warm and dry. Capillary refill takes less than 2 seconds. No rash noted. She is not diaphoretic. No erythema. No pallor.   Psychiatric: She has a normal mood and affect.   Vitals reviewed.      Significant Labs:   BMP:   Recent Labs   Lab 02/04/19 0406 02/05/19  0455   * 115*   * 131*   K 4.4 4.2   CL 98 97   CO2 29 29   BUN 23* 24*   CREATININE 0.6 0.5   CALCIUM 8.6* 8.5*   MG 1.8 1.9   , CBC:   Recent Labs   Lab 02/04/19 0406 02/05/19  0455   WBC 10.26 10.35   HGB 8.2* 7.9*   HCT 26.2* 25.1*   * 152   , CMP:   Recent Labs   Lab 02/04/19 0406 02/05/19  0455   * 131*   K 4.4 4.2   CL 98 97   CO2 29 29   *  115*   BUN 23* 24*   CREATININE 0.6 0.5   CALCIUM 8.6* 8.5*   PROT 7.0 6.6   ALBUMIN 2.4* 2.5*   BILITOT 0.3 0.3   ALKPHOS 82 78   AST 88* 87*   ALT 36 33   ANIONGAP 4* 5*   EGFRNONAA >60.0 >60.0   , Coagulation: No results for input(s): PT, INR, APTT in the last 48 hours., Haptoglobin:   Recent Labs   Lab 02/04/19  0406   HAPTOGLOBIN 153   , Immunology: No results for input(s): SPEP, ERVIN, DARCY, FREELAMBDALI in the last 48 hours., LDH: No results for input(s): LDHCSF, BFSOURCE in the last 48 hours., LFTs:   Recent Labs   Lab 02/04/19 0406 02/05/19  0455   ALT 36 33   AST 88* 87*   ALKPHOS 82 78   BILITOT 0.3 0.3   PROT 7.0 6.6   ALBUMIN 2.4* 2.5*   , Reticulocytes:   Recent Labs   Lab 02/04/19  0406   RETIC 2.6*   , Tumor Markers: No results for input(s): PSA, CEA, , AFPTM, [id number],  in the last 48 hours.    Invalid input(s): ALGTM, Uric Acid No results for input(s): URICACID in the last 48 hours., Urine Studies: No results for input(s): COLORU, APPEARANCEUA, PHUR, SPECGRAV, PROTEINUA, GLUCUA, KETONESU, BILIRUBINUA, OCCULTUA, NITRITE, UROBILINOGEN, LEUKOCYTESUR, RBCUA, WBCUA, BACTERIA, SQUAMEPITHEL, HYALINECASTS in the last 48 hours.    Invalid input(s): YANET,   Recent Lab Results       02/05/19 0455        Immature Granulocytes 0.8     Immature Grans (Abs) 0.08  Comment:  Mild elevation in immature granulocytes is non specific and   can be seen in a variety of conditions including stress response,   acute inflammation, trauma and pregnancy. Correlation with other   laboratory and clinical findings is essential.       Albumin 2.5     Alkaline Phosphatase 78     ALT 33     Anion Gap 5     AST 87     Baso # 0.00     Basophil% 0.0     Total Bilirubin 0.3  Comment:  For infants and newborns, interpretation of results should be based  on gestational age, weight and in agreement with clinical  observations.  Premature Infant recommended reference ranges:  Up to 24 hours.............<8.0 mg/dL  Up to 48  hours............<12.0 mg/dL  3-5 days..................<15.0 mg/dL  6-29 days.................<15.0 mg/dL       BUN, Bld 24     Calcium 8.5     Chloride 97     CO2 29          Creatinine 0.5     Differential Method Automated     eGFR if African American >60.0     eGFR if non  >60.0  Comment:  Calculation used to obtain the estimated glomerular filtration  rate (eGFR) is the CKD-EPI equation.        Eos # 0.0     Eosinophil% 0.0     Glucose 115     Gran # (ANC) 8.7     Gran% 83.8     Hematocrit 25.1     Hemoglobin 7.9     Lymph # 0.8     Lymph% 7.7     Magnesium 1.9     MCH 27.2     MCHC 31.5     MCV 87     Mono # 0.8     Mono% 7.7     MPV 11.1     nRBC 0     Phosphorus 2.9     Platelets 152     Potassium 4.2     Total Protein 6.6     RBC 2.90     RDW 19.3     Sodium 131     WBC 10.35        and All pertinent labs from the last 24 hours have been reviewed.    Diagnostic Results:  I have reviewed and interpreted all pertinent imaging results/findings within the past 24 hours.    Assessment/Plan:     * Myositis    - DDX dermatomyositis de haylie vs related to immunotherapy  - trend CPK daily; downtrending generally with a slight bump on 01/27  -restarted fluids 01/26 due to NPO  - myositis labs suspicious for myosits; rheum on board.   - F/u 1/24 skin biopsy. Pending results may need muscle biopsy by general surgery  -MRI left humerus suspicious for myositis    Per rheumatology  - Methylpred 125mg IV bid started, now tapered to 24mg IV bid  - MTX 15mg subQ qWeekly , next dose 2/5/19  - IVIG x5 days completed 2/3/19 will require 1x maintenance dose 4 weeks from 2/3 per rheum  - myomarker panel, HMGCR, QG-TB pending  - PFTs as OP  - Quantitative IgA 533, IgG 932, IgM 66  - ID consulted for fast track vacc  - f/u with Dr. Swift and Dr. Campos in Rheumatology at discharge  - Currently tapering steroids per rheumatology, transition to PO medrol 48mg daily once swallow study passed    Lab Results    Component Value Date     (H) 02/05/2019     (H) 02/04/2019     (H) 02/03/2019     (H) 02/02/2019     (H) 02/01/2019          Hypokalemia    Replete prn     Dysphagia    Patient is experiencing difficulty swallowing that has been increasing in severity over last couple of days to the point where patient did not feel as if she could swallow.  As of 01/26, SLP evaluated patient and determined that she should be NPO except for medications until a modified barium swallow could be performed. Possibly candidal esophagitis; patient has oral thrush.    Plan:  - High aspiration risk on modified barium study, strict NPO  - NGT in situ, tube feeds initiated per dietary consult  - Switched to IV meds where possible  - Fluconazole IVPB 200mg q24hr (now d/c'd)  - GI consulted, EGD resulted w/ Grade C erosive esophagitis, no candidal infxn identified. Bx obtained, GI recs start PPI bid  - Failed repeat swallow trial for NGT removal (2/4) SLP plans swallowing exercises, continue current steroid and immunosuppressive therapies, will repeat study on 2/6  - Currently famotidine 20 daily per NGT, when PO intake resumed switch to PPI PO bid per GI recs for esophagitis/ulcerations.     Swelling of upper arm    -B/L U/S negative for DVTs  -appears less edematous than day prior  -continue to monitor     Dermatitis    - Skin biopsy from 1/24 pending, appreciate dermatology recs     Candidiasis    Oral thrush  -cont Nystatin swish and swallow. Additionally, added fluconaozle 200 mg Po qday on 01/26 as possibility patient is experiencing esophagitis 2/2 to candida  -appears resolved now s/p fluconazole and nystatin     Rhabdomyolysis    -see myositis. Continue supportive care, Cr/ electrolyte/ CPK monitoring and aggressive hydration. transaminitis on CMP is likely due to skeletal muscle rhabdo, not hepatic     Drug rash    - Unclear if symptoms related to chemotherapy     Pre-diabetes    -check a1c, 0-5u  SSI while on steroids, add prandial insulin as warranted.      Adjustment disorder with depressed mood    -cont home SSRI     Vitamin B12 deficiency    -cont home B12 supplementation      Breast cancer of upper-outer quadrant of left female breast    -On January 24, 2017 a core needle biopsy was performed which showed infiltrating ductal carcinoma, high grade.  The tumor was ER negative, KY negative, and HER-2 negative.  -She had 4 cycles of  Wilbur-adjuvantTaxotere and Cytoxan completed  on 5/9/17.  -On June 26 she underwent left mastectomy.  That revealed 2 foci of invasive high-grade carcinoma measuring 14 mm and 1.5 mm.  Margins were negative.  -She completed 4 cycles of adjuvant Adriamycin on September 12, 2017.  -  A fine-needle aspirate of the lymph node was performed on October 19th.  That showed metastatic carcinoma consistent with breast primary which was ER negative, KY negative and HER 2-negative.    -Per rheumatology: hold paclitaxel agent and atezolizumb at this time - both can cause myositis      Hypertension    -on home amlodipine-benazepril; will hold ACEi for now and observe renal function with CPK elevation in rhabdo.     Plan:  - Amlodipine 10 mg qday on admission  - 1/28 Pt now strict NPO due to failed swallow study, now on hydralazine 10mg IV q8h WCTM and adjust PRN              Noah Patel MD  Hematology/Oncology  [facility]      I have reviewed the notes, assessments, and/or procedures performed by the housestaff, as above.  I have personally interviewed and examined the patient at the beside, and rounded with the housestaff. I concur with her/his assessment and plan and the documentation of Ermelinda Verde.  I, Dr. Conrad Cheung, personally spent more than  35 mins during this encounter, greater than 50% was spent in direct counseling and/or coordination of care.     Conrad Cheung M.D., M.S., F.A.C.P.  Hematology/Oncology Attending  [facility]

## 2019-02-05 NOTE — PLAN OF CARE
Problem: Adult Inpatient Plan of Care  Goal: Plan of Care Review  Outcome: Ongoing (interventions implemented as appropriate)  Patient alert, awake and oriented to person, place, time and situation.  VSS.  Patient remains free of fall or injury this shift.  Ambulated x2 in hallway with assist from .  Patient remains NPO with TF via NGT.  Tolerating goal rate of 65cc/hr without difficulty.

## 2019-02-05 NOTE — ASSESSMENT & PLAN NOTE
Contributing Nutrition Diagnosis  Inadequate energy intake    Related to (etiology):   NPO x 6 days (TF currently)    Signs and Symptoms (as evidenced by):   Wt loss 11.2% x 3 wks  Po intake <50%x 2wks now TF meeting 100% needs  Mild lean muscle wasting/depleted fat stores  Mild edema    Interventions/Recommendations (treatment strategy):  Recommend Isosource 1.5 @65ml/hr +water flushes q6hrs;    Provides 2340kcal, 106g/Pro, 1214ml free fl. flushes per MD.    Meets 100% EEN/EPN.     Start w 10ml/hr and increase by 10 ml/hr q4hr as tolerated until goal rate achieved.    Hold for residuals > 500ml    Nutrition Diagnosis Status:   Improving

## 2019-02-05 NOTE — PROGRESS NOTES
[facility]  Rheumatology  Progress Note    Patient Name: Ermelinda Verde  MRN: [id number]  Admission Date: 1/22/2019  Hospital Length of Stay: 14 days  Code Status: Full Code   Attending Provider: Conrad Cheung MD  Primary Care Physician: Reta Weeks MD  Principal Problem: Myositis    Subjective:     HPI: 59yo F with history of L sided infiltrating ductal carcinoma of the L breast (dx on Jan 2017), HTN, depression, and gastritis was send from hem/onc clinic for evaluation of possible inflammatory myositis.     L breast cancer s/p completion of 4 cycles of demi-adjuvant taxotere and cytoxan (completed on 5/9/17) and mastectomy (6/26/17) and then 4 cycles of adjuvant Adriamycin (completed 9/12/17). In 10/2017 - patient developed L supraclavicular lymphadenopathy which FNA confirmed as reoccurrence of previously treated breast cancer.     Patient started on Atelizumab/Abraxane on 11/7/18. Per chart review, patient noticed rashes on the dorsum of her hands on 11/11/18. Seen in urgent care and given topical steroid cream. Then received two more infusions on Atelizumab/Abraxane on 11/21/18 and 12/5/18. Started to notice puffiness around the eyes on 12/11/18. Received another Abraxane infusion on 12/12/18 but this time with hydrocortisone 50 mg which helped reduce the swelling around the eyes. Developed swelling of the face again on 12/15/18. Next infusion of Abraxane done on 12/19/18 with Solucortef and Atelizumab held. Abraxane given again on 1/3/19 with no IV steroid. Patient developed swelling of the face on 1/4/19 and went to urgent care and given short course of prednisone 20 mg BID. On 1/15/19, patient seen in hem/onc clinic with c/o of pressure and tightness around neck. CT scan of chest and neck did not reveal any vascular compression. Started on prednisone 60 mg with taper. On 1/22/18 patient with c/o proximal muscle weakness. CPK found to be elevated around 4k. Patient admitted and given  solumedrol 80 mg IV on 1/22/18. Last infusion of Atelizumab on 12/19/18. Last infusion of Abraxane on 1/3/19. Given Solumedrol 1g x 1 on 1/23/18. Seen by Dermatology on 1/24/18 and biopsy done of skin rash. Given distribution of rashes, there was concern for dermatomyositis. Patient started on Solumedrol 125 mg IV BID at this time.     Denies any family history of autoimmune diseases.    No smoking, EtOH, recreational drug usage.    Denies any photosensitivity, joint swelling, unintentional weigh loss, abdominal pain, night sweats, CP, SOB.  +oral thrush.     Interval History: Patient seen at bedside. Strength about the same. Will be repeating swallow eval tomorrow. Will be getting MTX 15 mg sq today. No new rashes. No trouble breathing, f/c, n/v/d.     Current Facility-Administered Medications   Medication Frequency    acetaminophen tablet 650 mg Q4H PRN    ALPRAZolam tablet 0.25 mg TID PRN    amLODIPine tablet 10 mg Daily    calcium-vitamin D3 500 mg(1,250mg) -200 unit per tablet 1 tablet BID    dextrose 50% injection 12.5 g PRN    dextrose 50% injection 25 g PRN    enoxaparin injection 40 mg Daily    escitalopram oxalate tablet 10 mg Daily    famotidine tablet 20 mg BID    folic acid tablet 1 mg Daily    glucagon (human recombinant) injection 1 mg PRN    glucose chewable tablet 16 g PRN    glucose chewable tablet 24 g PRN    guaifenesin 100 mg/5 ml syrup 200 mg Q4H    hepatitis B (HEPLISAV-B) 20 mcg/0.5 mL vaccine 0.5 mL vaccine x 1 dose    hydrALAZINE injection 10 mg Q6H PRN    insulin aspart U-100 pen 0-5 Units QID (AC + HS) PRN    methylPREDNISolone sodium succinate injection 24 mg Q12H    ondansetron disintegrating tablet 8 mg Q8H PRN    oxyCODONE immediate release tablet 5 mg Q6H PRN    pneumoc 13-brandin conj-dip cr(PF) (PREVNAR 13 (PF)) 0.5 mL vaccine x 1 dose    polyethylene glycol packet 17 g BID PRN    promethazine tablet 12.5 mg Q6H PRN    sodium chloride 0.65 % nasal spray 1 spray  PRN    sodium chloride 0.9% flush 10 mL PRN    sodium chloride 0.9% flush 5 mL PRN     Objective:     Vital Signs (Most Recent):  Temp: 98.3 °F (36.8 °C) (02/05/19 1119)  Pulse: 96 (02/05/19 1119)  Resp: 18 (02/05/19 1119)  BP: 120/63 (02/05/19 1119)  SpO2: 100 % (02/05/19 1119)  O2 Device (Oxygen Therapy): room air (02/05/19 0755) Vital Signs (24h Range):  Temp:  [98.3 °F (36.8 °C)-99.2 °F (37.3 °C)] 98.3 °F (36.8 °C)  Pulse:  [] 96  Resp:  [16-18] 18  SpO2:  [95 %-100 %] 100 %  BP: (105-137)/(58-69) 120/63     Weight: 75.5 kg (166 lb 7.2 oz) (02/05/19 0406)  Body mass index is 27.7 kg/m².  Body surface area is 1.86 meters squared.      Intake/Output Summary (Last 24 hours) at 2/5/2019 1201  Last data filed at 2/5/2019 1000  Gross per 24 hour   Intake 1615 ml   Output 600 ml   Net 1015 ml       Physical Exam   Constitutional: She is oriented to person, place, and time and well-developed, well-nourished, and in no distress. No distress.   HENT:   Head: Normocephalic and atraumatic.   Right Ear: External ear normal.   Left Ear: External ear normal.   Bilateral orbital edema with heliotropic rash - resolved   Eyes: Conjunctivae and EOM are normal. Pupils are equal, round, and reactive to light.   Neck: Normal range of motion. Neck supple.   Cardiovascular: Normal rate, regular rhythm, normal heart sounds and intact distal pulses.    Pulmonary/Chest: Effort normal and breath sounds normal. No respiratory distress.   Abdominal: Soft. Bowel sounds are normal.       Right Side Rheumatological Exam     Muscle Strength (0-5 scale):  Neck Flexion:  4.8  Neck Extension: 5  Deltoid:  4.2  Biceps: 5/5   Triceps:  4.2  : 5/5   Iliopsoas: 4.6  Quadriceps:  5   Distal Lower Extremity: 5    Left Side Rheumatological Exam     Muscle Strength (0-5 scale):  Neck Flexion:  4.8  Neck Extension: 5  Deltoid:  4.2  Biceps: 5/5   Triceps:  4.2  :  5/5   Iliopsoas: 4.6  Quadriceps:  5   Distal Lower Extremity:  5      Neurological: She is alert and oriented to person, place, and time. GCS score is 15.   Skin: Skin is warm and dry. No rash noted.     Hyperpigmented non raised rash over nape of neck, elbows, knuckles (resemble Gottron's papules), thighs, and ankles -resolving       Psychiatric: Mood, memory, affect and judgment normal.   Musculoskeletal: Normal range of motion. She exhibits edema. She exhibits no tenderness or deformity.         Significant Labs:  Recent Results (from the past 48 hour(s))   Comprehensive Metabolic Panel (CMP)    Collection Time: 02/04/19  4:06 AM   Result Value Ref Range    Sodium 131 (L) 136 - 145 mmol/L    Potassium 4.4 3.5 - 5.1 mmol/L    Chloride 98 95 - 110 mmol/L    CO2 29 23 - 29 mmol/L    Glucose 140 (H) 70 - 110 mg/dL    BUN, Bld 23 (H) 6 - 20 mg/dL    Creatinine 0.6 0.5 - 1.4 mg/dL    Calcium 8.6 (L) 8.7 - 10.5 mg/dL    Total Protein 7.0 6.0 - 8.4 g/dL    Albumin 2.4 (L) 3.5 - 5.2 g/dL    Total Bilirubin 0.3 0.1 - 1.0 mg/dL    Alkaline Phosphatase 82 55 - 135 U/L    AST 88 (H) 10 - 40 U/L    ALT 36 10 - 44 U/L    Anion Gap 4 (L) 8 - 16 mmol/L    eGFR if African American >60.0 >60 mL/min/1.73 m^2    eGFR if non African American >60.0 >60 mL/min/1.73 m^2   Magnesium    Collection Time: 02/04/19  4:06 AM   Result Value Ref Range    Magnesium 1.8 1.6 - 2.6 mg/dL   Phosphorus    Collection Time: 02/04/19  4:06 AM   Result Value Ref Range    Phosphorus 2.8 2.7 - 4.5 mg/dL   CK    Collection Time: 02/04/19  4:06 AM   Result Value Ref Range     (H) 20 - 180 U/L   CBC auto differential    Collection Time: 02/04/19  4:06 AM   Result Value Ref Range    WBC 10.26 3.90 - 12.70 K/uL    RBC 3.01 (L) 4.00 - 5.40 M/uL    Hemoglobin 8.2 (L) 12.0 - 16.0 g/dL    Hematocrit 26.2 (L) 37.0 - 48.5 %    MCV 87 82 - 98 fL    MCH 27.2 27.0 - 31.0 pg    MCHC 31.3 (L) 32.0 - 36.0 g/dL    RDW 19.0 (H) 11.5 - 14.5 %    Platelets 139 (L) 150 - 350 K/uL    MPV 11.2 9.2 - 12.9 fL    Immature Granulocytes 0.8  (H) 0.0 - 0.5 %    Gran # (ANC) 9.1 (H) 1.8 - 7.7 K/uL    Immature Grans (Abs) 0.08 (H) 0.00 - 0.04 K/uL    Lymph # 0.5 (L) 1.0 - 4.8 K/uL    Mono # 0.6 0.3 - 1.0 K/uL    Eos # 0.0 0.0 - 0.5 K/uL    Baso # 0.00 0.00 - 0.20 K/uL    nRBC 0 0 /100 WBC    Gran% 88.5 (H) 38.0 - 73.0 %    Lymph% 4.9 (L) 18.0 - 48.0 %    Mono% 5.8 4.0 - 15.0 %    Eosinophil% 0.0 0.0 - 8.0 %    Basophil% 0.0 0.0 - 1.9 %    Differential Method Automated    Iron and TIBC    Collection Time: 02/04/19  4:06 AM   Result Value Ref Range    Iron 37 30 - 160 ug/dL    Transferrin 167 (L) 200 - 375 mg/dL    TIBC 247 (L) 250 - 450 ug/dL    Saturated Iron 15 (L) 20 - 50 %   Ferritin    Collection Time: 02/04/19  4:06 AM   Result Value Ref Range    Ferritin 641 (H) 20.0 - 300.0 ng/mL   Transferrin    Collection Time: 02/04/19  4:06 AM   Result Value Ref Range    Transferrin 167 (L) 200 - 375 mg/dL   Haptoglobin    Collection Time: 02/04/19  4:06 AM   Result Value Ref Range    Haptoglobin 153 30 - 250 mg/dL   Reticulocytes    Collection Time: 02/04/19  4:06 AM   Result Value Ref Range    Retic 2.6 (H) 0.5 - 2.5 %   Lactate dehydrogenase    Collection Time: 02/04/19  4:06 AM   Result Value Ref Range     (H) 110 - 260 U/L   Comprehensive Metabolic Panel (CMP)    Collection Time: 02/05/19  4:55 AM   Result Value Ref Range    Sodium 131 (L) 136 - 145 mmol/L    Potassium 4.2 3.5 - 5.1 mmol/L    Chloride 97 95 - 110 mmol/L    CO2 29 23 - 29 mmol/L    Glucose 115 (H) 70 - 110 mg/dL    BUN, Bld 24 (H) 6 - 20 mg/dL    Creatinine 0.5 0.5 - 1.4 mg/dL    Calcium 8.5 (L) 8.7 - 10.5 mg/dL    Total Protein 6.6 6.0 - 8.4 g/dL    Albumin 2.5 (L) 3.5 - 5.2 g/dL    Total Bilirubin 0.3 0.1 - 1.0 mg/dL    Alkaline Phosphatase 78 55 - 135 U/L    AST 87 (H) 10 - 40 U/L    ALT 33 10 - 44 U/L    Anion Gap 5 (L) 8 - 16 mmol/L    eGFR if African American >60.0 >60 mL/min/1.73 m^2    eGFR if non African American >60.0 >60 mL/min/1.73 m^2   Magnesium    Collection Time:  02/05/19  4:55 AM   Result Value Ref Range    Magnesium 1.9 1.6 - 2.6 mg/dL   Phosphorus    Collection Time: 02/05/19  4:55 AM   Result Value Ref Range    Phosphorus 2.9 2.7 - 4.5 mg/dL   CK    Collection Time: 02/05/19  4:55 AM   Result Value Ref Range     (H) 20 - 180 U/L   CBC auto differential    Collection Time: 02/05/19  4:55 AM   Result Value Ref Range    WBC 10.35 3.90 - 12.70 K/uL    RBC 2.90 (L) 4.00 - 5.40 M/uL    Hemoglobin 7.9 (L) 12.0 - 16.0 g/dL    Hematocrit 25.1 (L) 37.0 - 48.5 %    MCV 87 82 - 98 fL    MCH 27.2 27.0 - 31.0 pg    MCHC 31.5 (L) 32.0 - 36.0 g/dL    RDW 19.3 (H) 11.5 - 14.5 %    Platelets 152 150 - 350 K/uL    MPV 11.1 9.2 - 12.9 fL    Immature Granulocytes 0.8 (H) 0.0 - 0.5 %    Gran # (ANC) 8.7 (H) 1.8 - 7.7 K/uL    Immature Grans (Abs) 0.08 (H) 0.00 - 0.04 K/uL    Lymph # 0.8 (L) 1.0 - 4.8 K/uL    Mono # 0.8 0.3 - 1.0 K/uL    Eos # 0.0 0.0 - 0.5 K/uL    Baso # 0.00 0.00 - 0.20 K/uL    nRBC 0 0 /100 WBC    Gran% 83.8 (H) 38.0 - 73.0 %    Lymph% 7.7 (L) 18.0 - 48.0 %    Mono% 7.7 4.0 - 15.0 %    Eosinophil% 0.0 0.0 - 8.0 %    Basophil% 0.0 0.0 - 1.9 %    Differential Method Automated      Results for ROMEO PEREZ (MRN [id number]) as of 2/5/2019 12:03   Ref. Range 2/1/2019 04:12 2/2/2019 04:44 2/3/2019 03:29 2/4/2019 04:06 2/5/2019 04:55   CPK Latest Ref Range: 20 - 180 U/L 827 (H) 662 (H) 517 (H) 465 (H) 434 (H)     Results for ROMEO PEREZ (MRN [id number]) as of 1/28/2019 10:23   Ref. Range 1/24/2019 03:40 1/25/2019 04:20 1/26/2019 04:00 1/27/2019 04:21 1/28/2019 04:00   Aldolase Latest Ref Range: 1.2 - 7.6 U/L 12.7 (H) 14.2 (H) 13.7 (H) 13.1 (H) 15.4 (H)   Results for ROMEO PEREZ (MRN [id number]) as of 1/28/2019 10:23   Ref. Range 1/22/2019 22:24   Sed Rate Latest Ref Range: 0 - 36 mm/Hr 50 (H)     Results for ROMEO PEREZ (MRN [id number]) as of 1/28/2019 10:23   Ref. Range 1/22/2019 22:24   CRP Latest Ref Range: 0.0 - 8.2 mg/L 31.1 (H)      Results for CHRIS  ROMEO WILLIAMSON (MRN [id number]) as of 1/28/2019 10:23   Ref. Range 1/22/2019 22:24 1/25/2019 17:33   DARCY HEP-2 Titer Unknown Positive 1:640 Sp...    Anti-SSA Antibody Latest Ref Range: 0.00 - 19.99 EU 0.49    Anti-SSA Interpretation Latest Ref Range: Negative  Negative    Anti-SSB Antibody Latest Ref Range: 0.00 - 19.99 EU 0.11    Anti-SSB Interpretation Latest Ref Range: Negative  Negative    ds DNA Ab Latest Ref Range: Negative 1:10  Negative 1:10    Anti Sm Antibody Latest Ref Range: 0.00 - 19.99 EU 0.58    Anti-Sm Interpretation Latest Ref Range: Negative  Negative    Anti Sm/RNP Antibody Latest Ref Range: 0.00 - 19.99 EU 0.62    Anti-Sm/RNP Interpretation Latest Ref Range: Negative  Negative    DARCY Screen Latest Ref Range: Negative <1:160  Positive (A)    Complement (C-3) Latest Ref Range: 50 - 180 mg/dL  106   Complement (C-4) Latest Ref Range: 11 - 44 mg/dL  29   Meg-1 Autoantibodies Latest Ref Range: <1.0 Index <1.00    Results for ROMEO PEREZ (MRN [id number]) as of 1/28/2019 10:23   Ref. Range 1/23/2019 02:55 1/23/2019 02:56   Specimen UA Unknown Urine, Clean Catch    Color, UA Latest Ref Range: Yellow, Straw, [place]  Colorless (A)    pH, UA Latest Ref Range: 5.0 - 8.0  6.0    Specific Gravity, UA Latest Ref Range: 1.005 - 1.030  1.005    Appearance, UA Latest Ref Range: Clear  Clear    Protein, UA Latest Ref Range: Negative  Negative    Glucose, UA Latest Ref Range: Negative  Negative    Ketones, UA Latest Ref Range: Negative  Negative    Occult Blood UA Latest Ref Range: Negative  Negative    Nitrite, UA Latest Ref Range: Negative  Negative    Bilirubin (UA) Latest Ref Range: Negative  Negative    Leukocytes, UA Latest Ref Range: Negative  Trace (A)    RBC, UA Latest Ref Range: 0 - 4 /hpf  2   WBC, UA Latest Ref Range: 0 - 5 /hpf  3   Squam Epithel, UA Latest Units: /hpf  0   Microscopic Comment Unknown  SEE COMMENT     Meg-1: Negative    Significant Imaging:  MRI Humerus w/ and w/o contrast:  Impression       1.  Diffuse edema and enhancement of the visualized left upper extremity musculature, most pronounced proximally, most notably of the deltoid and biceps musculature as detailed above.  Findings are nonspecific although could relate to a nonspecific myelitis particularly in light of patient's reported history.  Clinical correlation with appropriate history and lab markers advised.  2. No evidence of abscess or osteomyelitis.  This report was flagged in Epic as abnormal.     NM PET CT 12/27/18:  Impression       See above    Significant improvement in all the previously seen lymph nodes involving the left lower neck, supraclavicular region, axillary and retropectoral region.    Index left retropectoral SUV max 3.57, previously 27.2.     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 Biopsied.                        - Small hiatal hernia.                        - Normal stomach.                        - Normal examined duodenum.  Recommendation:       - Return patient to hospital to for ongoing care.                        - Await pathology results.                        - Use a proton pump inhibitor PO BID.                        - The findings and recommendations were discussed                         with the designated responsible adult.                        - Repeat upper endoscopy in 8 weeks to check                         healing.           Assessment/Plan:     * Myositis    59yo F with history of L sided infiltrating ductal carcinoma of the L breast (dx on Jan 2017), HTN, depression, and gastritis was send from hem/onc clinic for evaluation of possible inflammatory myositis.     Patient started on Atelizumab/Abraxane on 11/7/18. Per chart review, patient noticed rashes on the dorsum of her hands on 11/11/18. Seen in urgent care and given topical steroid cream. Then received two more infusions on  "Atelizumab/Abraxane on 11/21/18 and 12/5/18. Started to notice puffiness around the eyes on 12/11/18. Received another Abraxane infusion on 12/12/18 but this time with hydrocortisone 50 mg which helped reduce the swelling around the eyes. Developed swelling of the face again on 12/15/18. Next infusion of Abraxane done on 12/19/18 with Solucortef and Atelizumab held. Abraxane given again on 1/3/19 with no IV steroid. Patient developed swelling of the face on 1/4/19 and went to urgent care and given short course of prednisone 20 mg BID. On 1/15/19, patient seen in hem/onc clinic with c/o of pressure and tightness around neck. CT scan of chest and neck did not reveal any vascular compression. Started on prednisone 60 mg with taper. On 1/22/18 patient with c/o proximal muscle weakness. CPK found to be elevated around 4k. Patient admitted and given solumedrol 80 mg IV on 1/22/18. Last infusion of Atelizumab on 12/19/18. Last infusion of Abraxane on 1/3/19. Given Solumedrol 1g x 1 on 1/23/18. Seen by Dermatology on 1/24/18 and biopsy done of skin rash. Given distribution of rashes, there was concern for dermatomyositis. Patient started on Solumedrol 125 mg IV BID at this time.      Labs: ESR 50, CRP 31.1, CPK 4562 (trending down), Aldolase 13.6.  , ALT 64.  UA normal.  CBC unremarkable.  +DARCY 1:640 speckled with negative profile.  Complements normal. Normal GGT. RPR, HIV negative. Hep B/C negative.  Strongyloides negative. HMGCR antibody negative.  Ferritin elevated at 641, iron on low side at 37, tibc low at 247, transferrin low 167, haptoglobin 153, retic slightly increased at 2.6%, ldh increased at 325.     Spirometry: normal, normal diffusion capacity. FVC: 81%, DLCO: 114%    Case reports of docetaxel related inflammatory myositis had been documented. "...taxanes have been noted to cause disabling but transient arthralgia and myalgias; it is important to consider the possibility of inflammatory myopathy as a " "possible complication in patients undergoing treatment with these agents." - A case of docetaxel induced myositis and review of literature. Austin et al 2015.    Case reports in Rheumatology   Case reports of atezolizumb (immunomodulator) cause of myositis  - Lucy et al 2017 "Myositis as an adverse even of immune checkpoint blockade for cancer therapy.  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   Dermatomyositis can be associated with malignancy.  MRI of LUE showed edema of the deltoid and biceps.  Exam with proximal muscle weakness: b/l deltoids 4/5, b/l iliopsoas 4.4/5. 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     Inpatient treatments so far:  - Solumedrol 80 mg IV x 1 on 1/22/19  - Solumedrol 1 g IV x 1 on 1/23/19.  - Solumedrol 125 mg IV BID since 1/24/19 -->being tapered down.  - IVIG 1/29/19-2/2     Plan:  - chemotherapy can be re-started as it may help treat Dermatomyositis. Would recommend against checkpoint inhibitor, however, as it may have been a trigger for the dermatomyositis  - continue to monitor CPK, aldolase, AST/ALT  - Change Solumedrol to 24 mg IV q 12h. Once swallowing re-evaluated can change to Medrol 48 mg oral daily.  - completed IVIg 400 mg/kg daily x 5 days. Will need IVIg 1 g/kg on 2/28/19.   - will check sTR  - should get colonoscopy as outpatient as part of malignancy work-up   - increase MTX to 20 mg sc weekly (Tuesdays) with daily folic acid.   - recommend ID fast track consult for vaccinations.  - f/u Quant TB  - f/u esophageal biopsy results  - PT-->Rehab  - f/u myomarker panel   - PFTs with lung volumes as outpatient.   - 1200 mg dietary calcium and Vitamin D3 1000 mg " daily  - continue H2 blocker  - f/u with Dr. Swift and Dr. Campos in Rheumatology on 2/15/19 at 2PM           Wilner Reynolds MD  Rheumatology  [facility]        I  Have personally take the history and examined the patient and agree with fellow's note as stated above.    No improvement in dysphagia, swallowing study tomorrow but likely will need PEG by Gastro to replace the NGT  Due for methotrexate would suggest increasing to 20mg sc once  A week starting today with folic acid 1mg daily  Continue Solu-Medrol 24mg IV q 12h, can change to oral when gets PEG.  Await esophageal biopsy  Check with lab regarding QG-TB should be back by now.  Await Myomarker panel  Will f/u with Neptali Swift and Andres 2/15/19 in Rheum Clinic  Will need IVIg 1g/kg 2/28/19

## 2019-02-05 NOTE — PLAN OF CARE
Problem: Adult Inpatient Plan of Care  Goal: Plan of Care Review  Outcome: Ongoing (interventions implemented as appropriate)  Pt A & O x4, no complaints. Pt has UE weakness. Pt showered with assistance this AM. Pt has guaifenesin Q4. R chest port due for change today. Pt has NG tube in R araiza, otherwise NPO. Pt voids, last BM yesterday. Pt with non-skid footwear on, bed locked, in lowest position, call bell in reach, pt verbalized understanding.

## 2019-02-05 NOTE — SUBJECTIVE & OBJECTIVE
Interval History: Patient seen at bedside. Strength about the same. Will be repeating swallow eval tomorrow. Will be getting MTX 15 mg sq today. No new rashes. No trouble breathing, f/c, n/v/d.     Current Facility-Administered Medications   Medication Frequency    acetaminophen tablet 650 mg Q4H PRN    ALPRAZolam tablet 0.25 mg TID PRN    amLODIPine tablet 10 mg Daily    calcium-vitamin D3 500 mg(1,250mg) -200 unit per tablet 1 tablet BID    dextrose 50% injection 12.5 g PRN    dextrose 50% injection 25 g PRN    enoxaparin injection 40 mg Daily    escitalopram oxalate tablet 10 mg Daily    famotidine tablet 20 mg BID    folic acid tablet 1 mg Daily    glucagon (human recombinant) injection 1 mg PRN    glucose chewable tablet 16 g PRN    glucose chewable tablet 24 g PRN    guaifenesin 100 mg/5 ml syrup 200 mg Q4H    hepatitis B (HEPLISAV-B) 20 mcg/0.5 mL vaccine 0.5 mL vaccine x 1 dose    hydrALAZINE injection 10 mg Q6H PRN    insulin aspart U-100 pen 0-5 Units QID (AC + HS) PRN    methylPREDNISolone sodium succinate injection 24 mg Q12H    ondansetron disintegrating tablet 8 mg Q8H PRN    oxyCODONE immediate release tablet 5 mg Q6H PRN    pneumoc 13-brandin conj-dip cr(PF) (PREVNAR 13 (PF)) 0.5 mL vaccine x 1 dose    polyethylene glycol packet 17 g BID PRN    promethazine tablet 12.5 mg Q6H PRN    sodium chloride 0.65 % nasal spray 1 spray PRN    sodium chloride 0.9% flush 10 mL PRN    sodium chloride 0.9% flush 5 mL PRN     Objective:     Vital Signs (Most Recent):  Temp: 98.3 °F (36.8 °C) (02/05/19 1119)  Pulse: 96 (02/05/19 1119)  Resp: 18 (02/05/19 1119)  BP: 120/63 (02/05/19 1119)  SpO2: 100 % (02/05/19 1119)  O2 Device (Oxygen Therapy): room air (02/05/19 0755) Vital Signs (24h Range):  Temp:  [98.3 °F (36.8 °C)-99.2 °F (37.3 °C)] 98.3 °F (36.8 °C)  Pulse:  [] 96  Resp:  [16-18] 18  SpO2:  [95 %-100 %] 100 %  BP: (105-137)/(58-69) 120/63     Weight: 75.5 kg (166 lb 7.2 oz)  (02/05/19 0406)  Body mass index is 27.7 kg/m².  Body surface area is 1.86 meters squared.      Intake/Output Summary (Last 24 hours) at 2/5/2019 1201  Last data filed at 2/5/2019 1000  Gross per 24 hour   Intake 1615 ml   Output 600 ml   Net 1015 ml       Physical Exam   Constitutional: She is oriented to person, place, and time and well-developed, well-nourished, and in no distress. No distress.   HENT:   Head: Normocephalic and atraumatic.   Right Ear: External ear normal.   Left Ear: External ear normal.   Bilateral orbital edema with heliotropic rash - resolved   Eyes: Conjunctivae and EOM are normal. Pupils are equal, round, and reactive to light.   Neck: Normal range of motion. Neck supple.   Cardiovascular: Normal rate, regular rhythm, normal heart sounds and intact distal pulses.    Pulmonary/Chest: Effort normal and breath sounds normal. No respiratory distress.   Abdominal: Soft. Bowel sounds are normal.       Right Side Rheumatological Exam     Muscle Strength (0-5 scale):  Neck Flexion:  4.8  Neck Extension: 5  Deltoid:  4.2  Biceps: 5/5   Triceps:  4.2  : 5/5   Iliopsoas: 4.6  Quadriceps:  5   Distal Lower Extremity: 5    Left Side Rheumatological Exam     Muscle Strength (0-5 scale):  Neck Flexion:  4.8  Neck Extension: 5  Deltoid:  4.2  Biceps: 5/5   Triceps:  4.2  :  5/5   Iliopsoas: 4.6  Quadriceps:  5   Distal Lower Extremity: 5      Neurological: She is alert and oriented to person, place, and time. GCS score is 15.   Skin: Skin is warm and dry. No rash noted.     Hyperpigmented non raised rash over nape of neck, elbows, knuckles (resemble Gottron's papules), thighs, and ankles -resolving       Psychiatric: Mood, memory, affect and judgment normal.   Musculoskeletal: Normal range of motion. She exhibits edema. She exhibits no tenderness or deformity.         Significant Labs:  Recent Results (from the past 48 hour(s))   Comprehensive Metabolic Panel (CMP)    Collection Time: 02/04/19   4:06 AM   Result Value Ref Range    Sodium 131 (L) 136 - 145 mmol/L    Potassium 4.4 3.5 - 5.1 mmol/L    Chloride 98 95 - 110 mmol/L    CO2 29 23 - 29 mmol/L    Glucose 140 (H) 70 - 110 mg/dL    BUN, Bld 23 (H) 6 - 20 mg/dL    Creatinine 0.6 0.5 - 1.4 mg/dL    Calcium 8.6 (L) 8.7 - 10.5 mg/dL    Total Protein 7.0 6.0 - 8.4 g/dL    Albumin 2.4 (L) 3.5 - 5.2 g/dL    Total Bilirubin 0.3 0.1 - 1.0 mg/dL    Alkaline Phosphatase 82 55 - 135 U/L    AST 88 (H) 10 - 40 U/L    ALT 36 10 - 44 U/L    Anion Gap 4 (L) 8 - 16 mmol/L    eGFR if African American >60.0 >60 mL/min/1.73 m^2    eGFR if non African American >60.0 >60 mL/min/1.73 m^2   Magnesium    Collection Time: 02/04/19  4:06 AM   Result Value Ref Range    Magnesium 1.8 1.6 - 2.6 mg/dL   Phosphorus    Collection Time: 02/04/19  4:06 AM   Result Value Ref Range    Phosphorus 2.8 2.7 - 4.5 mg/dL   CK    Collection Time: 02/04/19  4:06 AM   Result Value Ref Range     (H) 20 - 180 U/L   CBC auto differential    Collection Time: 02/04/19  4:06 AM   Result Value Ref Range    WBC 10.26 3.90 - 12.70 K/uL    RBC 3.01 (L) 4.00 - 5.40 M/uL    Hemoglobin 8.2 (L) 12.0 - 16.0 g/dL    Hematocrit 26.2 (L) 37.0 - 48.5 %    MCV 87 82 - 98 fL    MCH 27.2 27.0 - 31.0 pg    MCHC 31.3 (L) 32.0 - 36.0 g/dL    RDW 19.0 (H) 11.5 - 14.5 %    Platelets 139 (L) 150 - 350 K/uL    MPV 11.2 9.2 - 12.9 fL    Immature Granulocytes 0.8 (H) 0.0 - 0.5 %    Gran # (ANC) 9.1 (H) 1.8 - 7.7 K/uL    Immature Grans (Abs) 0.08 (H) 0.00 - 0.04 K/uL    Lymph # 0.5 (L) 1.0 - 4.8 K/uL    Mono # 0.6 0.3 - 1.0 K/uL    Eos # 0.0 0.0 - 0.5 K/uL    Baso # 0.00 0.00 - 0.20 K/uL    nRBC 0 0 /100 WBC    Gran% 88.5 (H) 38.0 - 73.0 %    Lymph% 4.9 (L) 18.0 - 48.0 %    Mono% 5.8 4.0 - 15.0 %    Eosinophil% 0.0 0.0 - 8.0 %    Basophil% 0.0 0.0 - 1.9 %    Differential Method Automated    Iron and TIBC    Collection Time: 02/04/19  4:06 AM   Result Value Ref Range    Iron 37 30 - 160 ug/dL    Transferrin 167 (L) 200 -  375 mg/dL    TIBC 247 (L) 250 - 450 ug/dL    Saturated Iron 15 (L) 20 - 50 %   Ferritin    Collection Time: 02/04/19  4:06 AM   Result Value Ref Range    Ferritin 641 (H) 20.0 - 300.0 ng/mL   Transferrin    Collection Time: 02/04/19  4:06 AM   Result Value Ref Range    Transferrin 167 (L) 200 - 375 mg/dL   Haptoglobin    Collection Time: 02/04/19  4:06 AM   Result Value Ref Range    Haptoglobin 153 30 - 250 mg/dL   Reticulocytes    Collection Time: 02/04/19  4:06 AM   Result Value Ref Range    Retic 2.6 (H) 0.5 - 2.5 %   Lactate dehydrogenase    Collection Time: 02/04/19  4:06 AM   Result Value Ref Range     (H) 110 - 260 U/L   Comprehensive Metabolic Panel (CMP)    Collection Time: 02/05/19  4:55 AM   Result Value Ref Range    Sodium 131 (L) 136 - 145 mmol/L    Potassium 4.2 3.5 - 5.1 mmol/L    Chloride 97 95 - 110 mmol/L    CO2 29 23 - 29 mmol/L    Glucose 115 (H) 70 - 110 mg/dL    BUN, Bld 24 (H) 6 - 20 mg/dL    Creatinine 0.5 0.5 - 1.4 mg/dL    Calcium 8.5 (L) 8.7 - 10.5 mg/dL    Total Protein 6.6 6.0 - 8.4 g/dL    Albumin 2.5 (L) 3.5 - 5.2 g/dL    Total Bilirubin 0.3 0.1 - 1.0 mg/dL    Alkaline Phosphatase 78 55 - 135 U/L    AST 87 (H) 10 - 40 U/L    ALT 33 10 - 44 U/L    Anion Gap 5 (L) 8 - 16 mmol/L    eGFR if African American >60.0 >60 mL/min/1.73 m^2    eGFR if non African American >60.0 >60 mL/min/1.73 m^2   Magnesium    Collection Time: 02/05/19  4:55 AM   Result Value Ref Range    Magnesium 1.9 1.6 - 2.6 mg/dL   Phosphorus    Collection Time: 02/05/19  4:55 AM   Result Value Ref Range    Phosphorus 2.9 2.7 - 4.5 mg/dL   CK    Collection Time: 02/05/19  4:55 AM   Result Value Ref Range     (H) 20 - 180 U/L   CBC auto differential    Collection Time: 02/05/19  4:55 AM   Result Value Ref Range    WBC 10.35 3.90 - 12.70 K/uL    RBC 2.90 (L) 4.00 - 5.40 M/uL    Hemoglobin 7.9 (L) 12.0 - 16.0 g/dL    Hematocrit 25.1 (L) 37.0 - 48.5 %    MCV 87 82 - 98 fL    MCH 27.2 27.0 - 31.0 pg    MCHC 31.5 (L)  32.0 - 36.0 g/dL    RDW 19.3 (H) 11.5 - 14.5 %    Platelets 152 150 - 350 K/uL    MPV 11.1 9.2 - 12.9 fL    Immature Granulocytes 0.8 (H) 0.0 - 0.5 %    Gran # (ANC) 8.7 (H) 1.8 - 7.7 K/uL    Immature Grans (Abs) 0.08 (H) 0.00 - 0.04 K/uL    Lymph # 0.8 (L) 1.0 - 4.8 K/uL    Mono # 0.8 0.3 - 1.0 K/uL    Eos # 0.0 0.0 - 0.5 K/uL    Baso # 0.00 0.00 - 0.20 K/uL    nRBC 0 0 /100 WBC    Gran% 83.8 (H) 38.0 - 73.0 %    Lymph% 7.7 (L) 18.0 - 48.0 %    Mono% 7.7 4.0 - 15.0 %    Eosinophil% 0.0 0.0 - 8.0 %    Basophil% 0.0 0.0 - 1.9 %    Differential Method Automated      Results for ROMEO PEREZ (MRN [id number]) as of 2/5/2019 12:03   Ref. Range 2/1/2019 04:12 2/2/2019 04:44 2/3/2019 03:29 2/4/2019 04:06 2/5/2019 04:55   CPK Latest Ref Range: 20 - 180 U/L 827 (H) 662 (H) 517 (H) 465 (H) 434 (H)     Results for ROMEO PEREZ (MRN [id number]) as of 1/28/2019 10:23   Ref. Range 1/24/2019 03:40 1/25/2019 04:20 1/26/2019 04:00 1/27/2019 04:21 1/28/2019 04:00   Aldolase Latest Ref Range: 1.2 - 7.6 U/L 12.7 (H) 14.2 (H) 13.7 (H) 13.1 (H) 15.4 (H)   Results for ROMEO PEREZ (MRN [id number]) as of 1/28/2019 10:23   Ref. Range 1/22/2019 22:24   Sed Rate Latest Ref Range: 0 - 36 mm/Hr 50 (H)     Results for ROMEO PEREZ (MRN [id number]) as of 1/28/2019 10:23   Ref. Range 1/22/2019 22:24   CRP Latest Ref Range: 0.0 - 8.2 mg/L 31.1 (H)      Results for ROMEO PEREZ (MRN [id number]) as of 1/28/2019 10:23   Ref. Range 1/22/2019 22:24 1/25/2019 17:33   DARCY HEP-2 Titer Unknown Positive 1:640 Sp...    Anti-SSA Antibody Latest Ref Range: 0.00 - 19.99 EU 0.49    Anti-SSA Interpretation Latest Ref Range: Negative  Negative    Anti-SSB Antibody Latest Ref Range: 0.00 - 19.99 EU 0.11    Anti-SSB Interpretation Latest Ref Range: Negative  Negative    ds DNA Ab Latest Ref Range: Negative 1:10  Negative 1:10    Anti Sm Antibody Latest Ref Range: 0.00 - 19.99 EU 0.58    Anti-Sm Interpretation Latest Ref Range: Negative  Negative     Anti Sm/RNP Antibody Latest Ref Range: 0.00 - 19.99 EU 0.62    Anti-Sm/RNP Interpretation Latest Ref Range: Negative  Negative    DARCY Screen Latest Ref Range: Negative <1:160  Positive (A)    Complement (C-3) Latest Ref Range: 50 - 180 mg/dL  106   Complement (C-4) Latest Ref Range: 11 - 44 mg/dL  29   Meg-1 Autoantibodies Latest Ref Range: <1.0 Index <1.00    Results for ROMEO PEREZ (MRN [id number]) as of 1/28/2019 10:23   Ref. Range 1/23/2019 02:55 1/23/2019 02:56   Specimen UA Unknown Urine, Clean Catch    Color, UA Latest Ref Range: Yellow, Straw, [place]  Colorless (A)    pH, UA Latest Ref Range: 5.0 - 8.0  6.0    Specific Gravity, UA Latest Ref Range: 1.005 - 1.030  1.005    Appearance, UA Latest Ref Range: Clear  Clear    Protein, UA Latest Ref Range: Negative  Negative    Glucose, UA Latest Ref Range: Negative  Negative    Ketones, UA Latest Ref Range: Negative  Negative    Occult Blood UA Latest Ref Range: Negative  Negative    Nitrite, UA Latest Ref Range: Negative  Negative    Bilirubin (UA) Latest Ref Range: Negative  Negative    Leukocytes, UA Latest Ref Range: Negative  Trace (A)    RBC, UA Latest Ref Range: 0 - 4 /hpf  2   WBC, UA Latest Ref Range: 0 - 5 /hpf  3   Squam Epithel, UA Latest Units: /hpf  0   Microscopic Comment Unknown  SEE COMMENT     Meg-1: Negative    Significant Imaging:  MRI Humerus w/ and w/o contrast:  Impression       1. Diffuse edema and enhancement of the visualized left upper extremity musculature, most pronounced proximally, most notably of the deltoid and biceps musculature as detailed above.  Findings are nonspecific although could relate to a nonspecific myelitis particularly in light of patient's reported history.  Clinical correlation with appropriate history and lab markers advised.  2. No evidence of abscess or osteomyelitis.  This report was flagged in Epic as abnormal.     NM PET CT 12/27/18:  Impression       See above    Significant improvement in all the  previously seen lymph nodes involving the left lower neck, supraclavicular region, axillary and retropectoral region.    Index left retropectoral SUV max 3.57, previously 27.2.     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 Biopsied.                        - Small hiatal hernia.                        - Normal stomach.                        - Normal examined duodenum.  Recommendation:       - Return patient to hospital to for ongoing care.                        - Await pathology results.                        - Use a proton pump inhibitor PO BID.                        - The findings and recommendations were discussed                         with the designated responsible adult.                        - Repeat upper endoscopy in 8 weeks to check                         healing.

## 2019-02-05 NOTE — SUBJECTIVE & OBJECTIVE
Interval History:   NAEO, pt cough is improving. Still difficulties swalling, but working with SLP exercises. Dispo pending repeat swallow study 2/6. Next dose MTX today    Oncology Treatment Plan:   OP BREAST DOCETAXEL Q3W    Medications:  Continuous Infusions:  Scheduled Meds:   amLODIPine  10 mg Per NG tube Daily    calcium-vitamin D3  1 tablet Oral BID    enoxaparin  40 mg Subcutaneous Daily    escitalopram oxalate  10 mg Per NG tube Daily    famotidine  20 mg Per NG tube BID    folic acid  1 mg Oral Daily    guaifenesin 100 mg/5 ml  200 mg Per NG tube Q4H    methylPREDNISolone sodium succinate  24 mg Intravenous Q12H     PRN Meds:acetaminophen, ALPRAZolam, dextrose 50%, dextrose 50%, glucagon (human recombinant), glucose, glucose, hepatitis B, hydrALAZINE, insulin aspart U-100, ondansetron, oxyCODONE, pneumoc 13-brandin conj-dip cr(PF), polyethylene glycol, promethazine, sodium chloride, sodium chloride 0.9%, sodium chloride 0.9%     Review of Systems   Constitutional: Positive for fatigue. Negative for activity change, appetite change, chills, diaphoresis, fever and unexpected weight change.   HENT: Positive for facial swelling and trouble swallowing. Negative for congestion, ear discharge, hearing loss, postnasal drip, sinus pressure, sneezing, sore throat and tinnitus.    Eyes: Negative for photophobia, pain and redness.   Respiratory: Negative for apnea, cough, choking, chest tightness, shortness of breath and stridor.    Cardiovascular: Negative for chest pain, palpitations and leg swelling.   Gastrointestinal: Negative for abdominal pain, anal bleeding, constipation, diarrhea, nausea and rectal pain.   Endocrine: Negative for polyuria.   Genitourinary: Negative for dysuria and hematuria.   Musculoskeletal: Negative for arthralgias, back pain, gait problem, joint swelling, myalgias, neck pain and neck stiffness.   Skin: Positive for rash. Negative for color change and pallor.   Allergic/Immunologic:  Negative for immunocompromised state.   Neurological: Positive for weakness. Negative for dizziness, seizures and numbness.   Hematological: Positive for adenopathy. Does not bruise/bleed easily.   Psychiatric/Behavioral: Negative for agitation and behavioral problems.     Objective:     Vital Signs (Most Recent):  Temp: 98.7 °F (37.1 °C) (02/05/19 0406)  Pulse: 98 (02/05/19 0406)  Resp: 18 (02/05/19 0406)  BP: (!) 119/58 (02/05/19 0406)  SpO2: 95 % (02/05/19 0406) Vital Signs (24h Range):  Temp:  [98.4 °F (36.9 °C)-99.2 °F (37.3 °C)] 98.7 °F (37.1 °C)  Pulse:  [] 98  Resp:  [17-18] 18  SpO2:  [95 %-100 %] 95 %  BP: (109-137)/(58-69) 119/58     Weight: 75.5 kg (166 lb 7.2 oz)  Body mass index is 27.7 kg/m².  Body surface area is 1.86 meters squared.      Intake/Output Summary (Last 24 hours) at 2/5/2019 0818  Last data filed at 2/5/2019 0540  Gross per 24 hour   Intake 1625 ml   Output 600 ml   Net 1025 ml       Physical Exam   Constitutional: She is oriented to person, place, and time. She appears well-developed and well-nourished. No distress.   HENT:   Head: Atraumatic.   Nose: Nose normal.   Mouth/Throat: No oropharyngeal exudate.   Face appears swollen   Eyes: Conjunctivae and EOM are normal. Pupils are equal, round, and reactive to light. Right eye exhibits no discharge. Left eye exhibits no discharge. No scleral icterus.   Neck: Normal range of motion. Neck supple. No tracheal deviation present.   Cardiovascular: Normal rate, regular rhythm and intact distal pulses. Exam reveals no gallop and no friction rub.   Pulmonary/Chest: Effort normal and breath sounds normal. No respiratory distress. She has no wheezes. She has no rales. She exhibits no tenderness.   Abdominal: Soft. Bowel sounds are normal. She exhibits no distension and no mass. There is no tenderness. There is no rebound and no guarding.   Musculoskeletal: Normal range of motion. She exhibits edema (facial edema, upper extremity edema b/l,  resolving). She exhibits no tenderness or deformity.   Neurological: She is alert and oriented to person, place, and time. No cranial nerve deficit or sensory deficit.     4.5/5 shoulder strength on shoulder abduction; improving  5/5 flexion and extension preserved at elbow   Skin: Skin is warm and dry. Capillary refill takes less than 2 seconds. No rash noted. She is not diaphoretic. No erythema. No pallor.   Psychiatric: She has a normal mood and affect.   Vitals reviewed.      Significant Labs:   BMP:   Recent Labs   Lab 02/04/19 0406 02/05/19 0455   * 115*   * 131*   K 4.4 4.2   CL 98 97   CO2 29 29   BUN 23* 24*   CREATININE 0.6 0.5   CALCIUM 8.6* 8.5*   MG 1.8 1.9   , CBC:   Recent Labs   Lab 02/04/19 0406 02/05/19 0455   WBC 10.26 10.35   HGB 8.2* 7.9*   HCT 26.2* 25.1*   * 152   , CMP:   Recent Labs   Lab 02/04/19 0406 02/05/19 0455   * 131*   K 4.4 4.2   CL 98 97   CO2 29 29   * 115*   BUN 23* 24*   CREATININE 0.6 0.5   CALCIUM 8.6* 8.5*   PROT 7.0 6.6   ALBUMIN 2.4* 2.5*   BILITOT 0.3 0.3   ALKPHOS 82 78   AST 88* 87*   ALT 36 33   ANIONGAP 4* 5*   EGFRNONAA >60.0 >60.0   , Coagulation: No results for input(s): PT, INR, APTT in the last 48 hours., Haptoglobin:   Recent Labs   Lab 02/04/19 0406   HAPTOGLOBIN 153   , Immunology: No results for input(s): SPEP, ERVIN, DARCY, FREELAMBDALI in the last 48 hours., LDH: No results for input(s): LDHCSF, BFSOURCE in the last 48 hours., LFTs:   Recent Labs   Lab 02/04/19 0406 02/05/19 0455   ALT 36 33   AST 88* 87*   ALKPHOS 82 78   BILITOT 0.3 0.3   PROT 7.0 6.6   ALBUMIN 2.4* 2.5*   , Reticulocytes:   Recent Labs   Lab 02/04/19  0406   RETIC 2.6*   , Tumor Markers: No results for input(s): PSA, CEA, , AFPTM, [id number],  in the last 48 hours.    Invalid input(s): ALGTM, Uric Acid No results for input(s): URICACID in the last 48 hours., Urine Studies: No results for input(s): COLORU, APPEARANCEUA, PHUR, SPECGRAV, PROTEINUA,  GLUCUA, KETONESU, BILIRUBINUA, OCCULTUA, NITRITE, UROBILINOGEN, LEUKOCYTESUR, RBCUA, WBCUA, BACTERIA, SQUAMEPITHEL, HYALINECASTS in the last 48 hours.    Invalid input(s): YANET,   Recent Lab Results       02/05/19  0455        Immature Granulocytes 0.8     Immature Grans (Abs) 0.08  Comment:  Mild elevation in immature granulocytes is non specific and   can be seen in a variety of conditions including stress response,   acute inflammation, trauma and pregnancy. Correlation with other   laboratory and clinical findings is essential.       Albumin 2.5     Alkaline Phosphatase 78     ALT 33     Anion Gap 5     AST 87     Baso # 0.00     Basophil% 0.0     Total Bilirubin 0.3  Comment:  For infants and newborns, interpretation of results should be based  on gestational age, weight and in agreement with clinical  observations.  Premature Infant recommended reference ranges:  Up to 24 hours.............<8.0 mg/dL  Up to 48 hours............<12.0 mg/dL  3-5 days..................<15.0 mg/dL  6-29 days.................<15.0 mg/dL       BUN, Bld 24     Calcium 8.5     Chloride 97     CO2 29          Creatinine 0.5     Differential Method Automated     eGFR if African American >60.0     eGFR if non  >60.0  Comment:  Calculation used to obtain the estimated glomerular filtration  rate (eGFR) is the CKD-EPI equation.        Eos # 0.0     Eosinophil% 0.0     Glucose 115     Gran # (ANC) 8.7     Gran% 83.8     Hematocrit 25.1     Hemoglobin 7.9     Lymph # 0.8     Lymph% 7.7     Magnesium 1.9     MCH 27.2     MCHC 31.5     MCV 87     Mono # 0.8     Mono% 7.7     MPV 11.1     nRBC 0     Phosphorus 2.9     Platelets 152     Potassium 4.2     Total Protein 6.6     RBC 2.90     RDW 19.3     Sodium 131     WBC 10.35        and All pertinent labs from the last 24 hours have been reviewed.    Diagnostic Results:  I have reviewed and interpreted all pertinent imaging results/findings within the past 24 hours.

## 2019-02-05 NOTE — ASSESSMENT & PLAN NOTE
57yo F with history of L sided infiltrating ductal carcinoma of the L breast (dx on Jan 2017), HTN, depression, and gastritis was send from hem/onc clinic for evaluation of possible inflammatory myositis.     Patient started on Atelizumab/Abraxane on 11/7/18. Per chart review, patient noticed rashes on the dorsum of her hands on 11/11/18. Seen in urgent care and given topical steroid cream. Then received two more infusions on Atelizumab/Abraxane on 11/21/18 and 12/5/18. Started to notice puffiness around the eyes on 12/11/18. Received another Abraxane infusion on 12/12/18 but this time with hydrocortisone 50 mg which helped reduce the swelling around the eyes. Developed swelling of the face again on 12/15/18. Next infusion of Abraxane done on 12/19/18 with Solucortef and Atelizumab held. Abraxane given again on 1/3/19 with no IV steroid. Patient developed swelling of the face on 1/4/19 and went to urgent care and given short course of prednisone 20 mg BID. On 1/15/19, patient seen in hem/onc clinic with c/o of pressure and tightness around neck. CT scan of chest and neck did not reveal any vascular compression. Started on prednisone 60 mg with taper. On 1/22/18 patient with c/o proximal muscle weakness. CPK found to be elevated around 4k. Patient admitted and given solumedrol 80 mg IV on 1/22/18. Last infusion of Atelizumab on 12/19/18. Last infusion of Abraxane on 1/3/19. Given Solumedrol 1g x 1 on 1/23/18. Seen by Dermatology on 1/24/18 and biopsy done of skin rash. Given distribution of rashes, there was concern for dermatomyositis. Patient started on Solumedrol 125 mg IV BID at this time.      Labs: ESR 50, CRP 31.1, CPK 4562 (trending down), Aldolase 13.6.  , ALT 64.  UA normal.  CBC unremarkable.  +DARCY 1:640 speckled with negative profile.  Complements normal. Normal GGT. RPR, HIV negative. Hep B/C negative.  Strongyloides negative. HMGCR antibody negative.  Ferritin elevated at 641, iron on low side at  "37, tibc low at 247, transferrin low 167, haptoglobin 153, retic slightly increased at 2.6%, ldh increased at 325.     Spirometry: normal, normal diffusion capacity. FVC: 81%, DLCO: 114%    Case reports of docetaxel related inflammatory myositis had been documented. "...taxanes have been noted to cause disabling but transient arthralgia and myalgias; it is important to consider the possibility of inflammatory myopathy as a possible complication in patients undergoing treatment with these agents." - A case of docetaxel induced myositis and review of literature. Austin et al 2015.    Case reports in Rheumatology   Case reports of atezolizumb (immunomodulator) cause of myositis  - Lucy et al 2017 "Myositis as an adverse even of immune checkpoint blockade for cancer therapy.  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   Dermatomyositis can be associated with malignancy.  MRI of LUE showed edema of the deltoid and biceps.  Exam with proximal muscle weakness: b/l deltoids 4/5, b/l iliopsoas 4.4/5. 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     Inpatient treatments so far:  - Solumedrol 80 mg IV x 1 on 1/22/19  - Solumedrol 1 g IV x 1 on 1/23/19.  - Solumedrol 125 mg IV BID since 1/24/19 -->being tapered down.  - IVIG 1/29/19-2/2     Plan:  - chemotherapy can be re-started as it may help treat Dermatomyositis. Would recommend against checkpoint inhibitor, however, as it may have been a trigger for the dermatomyositis  - continue to monitor CPK, aldolase, AST/ALT  - Change Solumedrol to 24 mg IV q 12h. Once swallowing re-evaluated can change to Medrol 48 mg oral daily.  - completed IVIg 400 mg/kg daily x 5 days  - " will check sTR  - should get colonoscopy as outpatient as part of malignancy work-up   - MTX 15 mg sc weekly (Tuesdays) with daily folic acid.   - recommend ID fast track consult for vaccinations.  - f/u Quant TB  - f/u esophageal biopsy results  - PT-->Rehab  - f/u myomarker panel   - PFTs with lung volumes as outpatient.   - 1200 mg dietary calcium and Vitamin D3 1000 mg daily  - continue H2 blocker  - f/u with Dr. Swift and Dr. Campos in Rheumatology on 2/15/19 at 2PM

## 2019-02-05 NOTE — PROGRESS NOTES
"[facility]  Adult Nutrition  Progress Note    SUMMARY       Recommendations    Recommendation/Intervention:   Recommend Isosource 1.5 @65ml/hr +water flushes q6hrs;    Provides 2340kcal, 106g/Pro, 1214ml free fl. flushes per MD.    Meets 100% EEN/EPN.     Start w 10ml/hr and increase by 10 ml/hr q4hr as tolerated until goal rate achieved.    Hold for residuals > 500ml  Goals: tolerate TFat goal rate  Nutrition Goal Status: goal met  Communication of RD Recs: discussed on rounds    Reason for Assessment    Reason For Assessment: RD follow-up  Diagnosis: (Myositis)  Relevant Medical History: breast cancer, Diverticulosis, HTN, Gastritis  Interdisciplinary Rounds: attended  General Information Comments: TF on hold for meds, spoke to pt and RN, TF will resume at goal rate. no residuals noted. pt had questions concerning kcal goals and flow rate. RD was able to explain TF rate to pt and . NFPE exam completed  wt loss 11.2 % x 2 wks, po intake mild muscle wasting and fat loss noted    Nutrition Risk Screen    Nutrition Risk Screen: dysphagia or difficulty swallowing    Nutrition/Diet History    Spiritual, Cultural Beliefs, Synagogue Practices, Values that Affect Care: no    Anthropometrics    Temp: 98.4 °F (36.9 °C)  Height: 5' 5" (165.1 cm)  Height (inches): 65 in  Weight Method: Standard Scale  Weight: 75.5 kg (166 lb 7.2 oz)  Weight (lb): 166.45 lb  Ideal Body Weight (IBW), Female: 125 lb  % Ideal Body Weight, Female (lb): 140.83 lb  BMI (Calculated): 29.4  BMI Grade: 30 - 34.9- obesity - grade I  Weight Loss: unintentional  Usual Body Weight (UBW), k.9 kg  % Usual Body Weight: 89.01  % Weight Change From Usual Weight: -11.18 %  Weight Loss Since Admission: 12 lb 5.5 oz  % Weight Change Since Admission: 7.01       Lab/Procedures/Meds    Pertinent Labs Reviewed: reviewed  Pertinent Labs Comments: NA-131, Glu-115, BUN-24  Pertinent Medications Reviewed: reviewed  Pertinent Medications " Comments: folic acid, insulin, methylprednisolone    Physical Findings/Assessment         Estimated/Assessed Needs    Weight Used For Calorie Calculations: 75.5 kg (166 lb 7.2 oz)  Energy Calorie Requirements (kcal): 4648-5515 kcal/d  Energy Need Method: Kcal/kg(30-35kcal/kg)  Protein Requirements: 98-114g/d (1.3-1.5g/kg)  Weight Used For Protein Calculations: 75.5 kg (166 lb 7.2 oz)     Estimated Fluid Requirement Method: RDA Method(1ml/kg or per MD)  RDA Method (mL): 2265  CHO Requirement: NA      Nutrition Prescription Ordered    Current Diet Order: NPO  Current Nutrition Support Formula Ordered: Isosource 1.5  Current Nutrition Support Rate Ordered: (65ml/hr)    Evaluation of Received Nutrient/Fluid Intake    Enteral Calories (kcal): 2340  Enteral Protein (gm): 105  Enteral (Free Water) Fluid (mL): 1214  % Kcal Needs: 100  % Protein Needs: 100  I/O: -907ml SA  Energy Calories Required: meeting needs  Protein Required: not meeting needs  Fluid Required: meeting needs  Comments: LBM:2/4/19  Tolerance: tolerating  % Intake of Estimated Energy Needs: 75 - 100 %  % Meal Intake: 75 - 100 %    Nutrition Risk    Level of Risk/Frequency of Follow-up: low     Assessment and Plan    Moderate malnutrition    Contributing Nutrition Diagnosis  Inadequate energy intake    Related to (etiology):   NPO x 6 days (TF currently)    Signs and Symptoms (as evidenced by):   Wt loss 11.2% x 3 wks  Po intake <50%x 2wks now TF meeting 100% needs  Mild lean muscle wasting/depleted fat stores  Mild edema    Interventions/Recommendations (treatment strategy):  Recommend Isosource 1.5 @65ml/hr +water flushes q6hrs;    Provides 2340kcal, 106g/Pro, 1214ml free fl. flushes per MD.    Meets 100% EEN/EPN.     Start w 10ml/hr and increase by 10 ml/hr q4hr as tolerated until goal rate achieved.    Hold for residuals > 500ml    Nutrition Diagnosis Status:   Improving            Monitor and Evaluation    Food and Nutrient Intake: energy intake, food  and beverage intake, enteral nutrition intake  Food and Nutrient Adminstration: diet order, enteral and parenteral nutrition administration  Anthropometric Measurements: weight, weight change  Biochemical Data, Medical Tests and Procedures: (all labs)  Nutrition-Focused Physical Findings: overall appearance     Malnutrition Assessment  Malnutrition Type: acute illness or injury  Energy Intake: (meeting needs now)          Weight Loss (Malnutrition): greater than 7.5% in 3 months(11.2% x 3 mo)  Energy Intake (Malnutrition): less than or equal to 50% for greater than or equal to 1 month  Subcutaneous Fat (Malnutrition): mild depletion  Muscle Mass (Malnutrition): mild depletion  Fluid Accumulation (Malnutrition): mild   Orbital Region (Subcutaneous Fat Loss): mild depletion  Upper Arm Region (Subcutaneous Fat Loss): mild depletion  Thoracic and Lumbar Region: mild depletion   [place] Region (Muscle Loss): mild depletion  Clavicle Bone Region (Muscle Loss): mild depletion  Patellar Region (Muscle Loss): mild depletion  Anterior Thigh Region (Muscle Loss): mild depletion  Posterior Calf Region (Muscle Loss): mild depletion   Edema (Fluid Accumulation): 1-->trace   Subcutaneous Fat Loss (Final Summary): mild protein-calorie malnutrition  Muscle Loss Evaluation (Final Summary): mild protein-calorie malnutrition         Nutrition Follow-Up    RD Follow-up?: Yes

## 2019-02-05 NOTE — ASSESSMENT & PLAN NOTE
Patient is experiencing difficulty swallowing that has been increasing in severity over last couple of days to the point where patient did not feel as if she could swallow.  As of 01/26, SLP evaluated patient and determined that she should be NPO except for medications until a modified barium swallow could be performed. Possibly candidal esophagitis; patient has oral thrush.    Plan:  - High aspiration risk on modified barium study, strict NPO  - NGT in situ, tube feeds initiated per dietary consult  - Switched to IV meds where possible  - Fluconazole IVPB 200mg q24hr (now d/c'd)  - GI consulted, EGD resulted w/ Grade C erosive esophagitis, no candidal infxn identified. Bx obtained, GI recs start PPI bid  - Failed repeat swallow trial for NGT removal (2/4) SLP plans swallowing exercises, continue current steroid and immunosuppressive therapies, will repeat study on 2/6  - Currently famotidine 20 daily per NGT, when PO intake resumed switch to PPI PO bid per GI recs for esophagitis/ulcerations.

## 2019-02-05 NOTE — PLAN OF CARE
Problem: SLP Goal  Goal: SLP Goal  Speech-Language Pathology   Goals expected to be met by 2/12  1) Pt will participate in ongoing swallow assessment to determine appropriateness of po diet.   2) Pt will participate in repeat MBSS to further assess swallow function.   3) Pt will participate in trial dysphagia tx in attempt to improve strength/safety  of swallow .  4) Pt / family education regarding dysphagia and aspiration risk.     Outcome: Ongoing (interventions implemented as appropriate)    Recommend ongoing NPO with cont'd NG tube for all nutrition, hydration, medication. Per review of pt's medical chart, repeat MBSS scheduled for completion tomorrow 2/6/19 per medical team to determine safest, least restrictive diet.     IRENA Khan, CCC-SLP  [phone]  2/5/2019

## 2019-02-05 NOTE — PLAN OF CARE
Problem: Adult Inpatient Plan of Care  Goal: Plan of Care Review  Outcome: Ongoing (interventions implemented as appropriate)  Side rails up x2; call bell in place; bed in lowest, locked position; skid proof socks on; no evidence of skin breakdown; care plan explained to patient; pt remains free of injury. Pt with TF at 65 cc/hr,  no residual, NGT secured. Pt denies pain, N/V or diarrhea. Voids, BM x 1, ambulated with a walker per PT to assist. VSS and afebrile. Bedside swallow study completed pt failed remains NPO.

## 2019-02-05 NOTE — PT/OT/SLP PROGRESS
"Speech Language Pathology Treatment    Patient Name:  Ermelinda Verde   MRN:  [id number]   [id number]    Admitting Diagnosis: Myositis    Recommendations:                 General Recommendations:  Dysphagia therapy and per review of pt's medical chart, repeat MBSS per medical team  Diet recommendations:  NPO, Liquid Diet Level: NPO   Aspiration Precautions: Strict aspiration precautions   General Precautions: Standard, aspiration, fall, NPO    Subjective     "It's gotten better." Pt re: strength of volitional cough/ throat clear.     Pain/Comfort:  · Pain Rating 1: 0/10  · Pain Rating Post-Intervention 1: 0/10    Objective:     Has the patient been evaluated by SLP for swallowing?   Yes  Keep patient NPO? Yes   Current Respiratory Status: room air      Pt toileting upon entry, observed to ind'ly walk from bathroom to seated fully upright in bedside chair.  present. Handout re: independent completion of dysphagia exercises provided during prior SLP session retrieved by . While referring to hand out, pt ind'ly demonstrated effortful swallow, ehsan, and supraglottic swallow dysphagia exercises with good ability. Additional exercises, BOT /k/ and /g/ introduced and demonstrated, as well as written on handout to be completed ind'ly along with previously mentioned dysphagia exercises. Following clinician demonstration, pt completed each x3-4. Dec'd vocal strength noted. Per pt's , pt continues to ind'ly, frequently use blue yankauer to suction oral secretions. Of note, suctioning unappreciated throughout this session. However pt and  engaged in conversation with clinician re: ongoing frequent need for suctioning concerning for cont'd dec'd oral secretion management and inc'd risk for aspiration. Education provided re: ongoing, independent completion of dysphagia exercises 5-10x/ each, 2-3x/ day, repeat MBSS scheduled for tomorrow to determine safest, least restrictive diet per medical team, and " ongoing SLP POC. Pt and  verbalized understanding of all education provided and agreement with SLP POC. White board updated. No further questions.     Results discussed with NSG.     Assessment:     Ermelinda Verde is a 58 y.o. female with an SLP diagnosis of dysphagia.     Goals:   Multidisciplinary Problems     SLP Goals        Problem: SLP Goal    Goal Priority Disciplines Outcome   SLP Goal     SLP Ongoing (interventions implemented as appropriate)   Description:  Speech-Language Pathology   Goals expected to be met by 2/12  1) Pt will participate in ongoing swallow assessment to determine appropriateness of po diet.   2) Pt will participate in repeat MBSS to further assess swallow function.   3) Pt will participate in trial dysphagia tx in attempt to improve strength/safety  of swallow .  4) Pt / family education regarding dysphagia and aspiration risk.                     Plan:     · Patient to be seen:  4 x/week   · Plan of Care expires:  02/24/19  · Plan of Care reviewed with:  patient, spouse   · SLP Follow-Up:  Yes       Discharge recommendations:  other (see comments)(HH)     Time Tracking:     SLP Treatment Date:   02/05/19  Speech Start Time:  1126  Speech Stop Time:  1153     Speech Total Time (min):  27 min    Billable Minutes: Treatment Swallowing Dysfunction 14 and Seld Care/Home Management Training 13    IRENA Khan, CCC-SLP  [phone]  2/5/2019

## 2019-02-05 NOTE — ASSESSMENT & PLAN NOTE
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   - F/u 1/24 skin biopsy. Pending results may need muscle biopsy by general surgery  -MRI left humerus suspicious for myositis    Per rheumatology  - Methylpred 125mg IV bid started, now tapered to 24mg IV bid  - MTX 15mg subQ qWeekly , next dose 2/5/19  - IVIG x5 days completed 2/3/19 will require 1x maintenance dose 4 weeks from 2/3 per rheum  - myomarker panel, HMGCR, QG-TB pending  - PFTs as OP  - Quantitative IgA 533, IgG 932, IgM 66  - ID consulted for fast track vacc  - f/u with Dr. Swift and Dr. Campos in Rheumatology at discharge  - Currently tapering steroids per rheumatology, transition to PO medrol 48mg daily once swallow study passed    Lab Results   Component Value Date     (H) 02/05/2019     (H) 02/04/2019     (H) 02/03/2019     (H) 02/02/2019     (H) 02/01/2019

## 2019-02-06 LAB
ALBUMIN SERPL BCP-MCNC: 2.6 G/DL
ALDOLASE SERPL-CCNC: 3.1 U/L
ALDOLASE SERPL-CCNC: 3.5 U/L
ALDOLASE SERPL-CCNC: 3.7 U/L
ALP SERPL-CCNC: 79 U/L
ALT SERPL W/O P-5'-P-CCNC: 36 U/L
ANION GAP SERPL CALC-SCNC: 5 MMOL/L
AST SERPL-CCNC: 90 U/L
BASOPHILS # BLD AUTO: 0.01 K/UL
BASOPHILS NFR BLD: 0.1 %
BILIRUB SERPL-MCNC: 0.3 MG/DL
BUN SERPL-MCNC: 25 MG/DL
CALCIUM SERPL-MCNC: 8.8 MG/DL
CHLORIDE SERPL-SCNC: 99 MMOL/L
CK SERPL-CCNC: 448 U/L
CO2 SERPL-SCNC: 28 MMOL/L
CREAT SERPL-MCNC: 0.6 MG/DL
DIFFERENTIAL METHOD: ABNORMAL
EOSINOPHIL # BLD AUTO: 0 K/UL
EOSINOPHIL NFR BLD: 0 %
ERYTHROCYTE [DISTWIDTH] IN BLOOD BY AUTOMATED COUNT: 19.3 %
EST. GFR  (AFRICAN AMERICAN): >60 ML/MIN/1.73 M^2
EST. GFR  (NON AFRICAN AMERICAN): >60 ML/MIN/1.73 M^2
GLUCOSE SERPL-MCNC: 140 MG/DL
HCT VFR BLD AUTO: 24.8 %
HGB BLD-MCNC: 7.7 G/DL
IMM GRANULOCYTES # BLD AUTO: 0.08 K/UL
IMM GRANULOCYTES NFR BLD AUTO: 0.7 %
LYMPHOCYTES # BLD AUTO: 0.7 K/UL
LYMPHOCYTES NFR BLD: 5.6 %
MAGNESIUM SERPL-MCNC: 1.9 MG/DL
MCH RBC QN AUTO: 26.7 PG
MCHC RBC AUTO-ENTMCNC: 31 G/DL
MCV RBC AUTO: 86 FL
MONOCYTES # BLD AUTO: 0.7 K/UL
MONOCYTES NFR BLD: 5.9 %
NEUTROPHILS # BLD AUTO: 10.2 K/UL
NEUTROPHILS NFR BLD: 87.7 %
NRBC BLD-RTO: 0 /100 WBC
PHOSPHATE SERPL-MCNC: 2.9 MG/DL
PLATELET # BLD AUTO: 160 K/UL
PMV BLD AUTO: 11 FL
POTASSIUM SERPL-SCNC: 4.2 MMOL/L
PROT SERPL-MCNC: 6.7 G/DL
RBC # BLD AUTO: 2.88 M/UL
SODIUM SERPL-SCNC: 132 MMOL/L
STFR SERPL-MCNC: 3.1 MG/L
WBC # BLD AUTO: 11.62 K/UL

## 2019-02-06 PROCEDURE — 25000003 PHARM REV CODE 250: Performed by: STUDENT IN AN ORGANIZED HEALTH CARE EDUCATION/TRAINING PROGRAM

## 2019-02-06 PROCEDURE — 20600001 HC STEP DOWN PRIVATE ROOM

## 2019-02-06 PROCEDURE — 80053 COMPREHEN METABOLIC PANEL: CPT

## 2019-02-06 PROCEDURE — 85025 COMPLETE CBC W/AUTO DIFF WBC: CPT

## 2019-02-06 PROCEDURE — 25000003 PHARM REV CODE 250: Performed by: INTERNAL MEDICINE

## 2019-02-06 PROCEDURE — 99233 SBSQ HOSP IP/OBS HIGH 50: CPT | Mod: ,,, | Performed by: INTERNAL MEDICINE

## 2019-02-06 PROCEDURE — 99233 PR SUBSEQUENT HOSPITAL CARE,LEVL III: ICD-10-PCS | Mod: ,,, | Performed by: INTERNAL MEDICINE

## 2019-02-06 PROCEDURE — 63600175 PHARM REV CODE 636 W HCPCS: Performed by: INTERNAL MEDICINE

## 2019-02-06 PROCEDURE — 92611 MOTION FLUOROSCOPY/SWALLOW: CPT

## 2019-02-06 PROCEDURE — 82550 ASSAY OF CK (CPK): CPT

## 2019-02-06 PROCEDURE — 84100 ASSAY OF PHOSPHORUS: CPT

## 2019-02-06 PROCEDURE — 83735 ASSAY OF MAGNESIUM: CPT

## 2019-02-06 PROCEDURE — 82085 ASSAY OF ALDOLASE: CPT

## 2019-02-06 RX ORDER — MORPHINE SULFATE 2 MG/ML
2 INJECTION, SOLUTION INTRAMUSCULAR; INTRAVENOUS EVERY 6 HOURS PRN
Status: DISCONTINUED | OUTPATIENT
Start: 2019-02-06 | End: 2019-02-13 | Stop reason: HOSPADM

## 2019-02-06 RX ORDER — SODIUM CHLORIDE 0.9 % (FLUSH) 0.9 %
10 SYRINGE (ML) INJECTION
Status: CANCELLED | OUTPATIENT
Start: 2019-02-06

## 2019-02-06 RX ORDER — METHOTREXATE 25 MG/ML
20 INJECTION, SOLUTION INTRA-ARTERIAL; INTRAMUSCULAR; INTRAVENOUS ONCE
Status: COMPLETED | OUTPATIENT
Start: 2019-02-06 | End: 2019-02-06

## 2019-02-06 RX ORDER — HEPARIN 100 UNIT/ML
500 SYRINGE INTRAVENOUS
Status: CANCELLED | OUTPATIENT
Start: 2019-02-06

## 2019-02-06 RX ORDER — SODIUM CHLORIDE 0.9 % (FLUSH) 0.9 %
10 SYRINGE (ML) INJECTION
Status: DISCONTINUED | OUTPATIENT
Start: 2019-02-06 | End: 2019-02-11

## 2019-02-06 RX ADMIN — GUAIFENESIN 200 MG: 200 SOLUTION ORAL at 09:02

## 2019-02-06 RX ADMIN — FOLIC ACID 1 MG: 1 TABLET ORAL at 09:02

## 2019-02-06 RX ADMIN — GUAIFENESIN 200 MG: 200 SOLUTION ORAL at 01:02

## 2019-02-06 RX ADMIN — OYSTER SHELL CALCIUM WITH VITAMIN D 1 TABLET: 500; 200 TABLET, FILM COATED ORAL at 09:02

## 2019-02-06 RX ADMIN — FAMOTIDINE 20 MG: 20 TABLET ORAL at 09:02

## 2019-02-06 RX ADMIN — METHYLPREDNISOLONE SODIUM SUCCINATE 24 MG: 125 INJECTION, POWDER, FOR SOLUTION INTRAMUSCULAR; INTRAVENOUS at 09:02

## 2019-02-06 RX ADMIN — ESCITALOPRAM OXALATE 10 MG: 5 TABLET, FILM COATED ORAL at 09:02

## 2019-02-06 RX ADMIN — METHOTREXATE SODIUM 20 MG: 25 INJECTION, SOLUTION INTRA-ARTERIAL; INTRAMUSCULAR; INTRAVENOUS at 09:02

## 2019-02-06 RX ADMIN — AMLODIPINE BESYLATE 10 MG: 10 TABLET ORAL at 09:02

## 2019-02-06 RX ADMIN — GUAIFENESIN 200 MG: 200 SOLUTION ORAL at 06:02

## 2019-02-06 RX ADMIN — ENOXAPARIN SODIUM 40 MG: 100 INJECTION SUBCUTANEOUS at 04:02

## 2019-02-06 NOTE — SUBJECTIVE & OBJECTIVE
Interval History:   NAEO, pt cough is improving. Still difficulties swalling, but working with SLP exercises. Dispo pending repeat swallow study today (2/6). Next dose MTX today    Oncology Treatment Plan:   OP BREAST DOCETAXEL Q3W    Medications:  Continuous Infusions:  Scheduled Meds:   amLODIPine  10 mg Per NG tube Daily    calcium-vitamin D3  1 tablet Oral BID    enoxaparin  40 mg Subcutaneous Daily    escitalopram oxalate  10 mg Per NG tube Daily    famotidine  20 mg Per NG tube BID    folic acid  1 mg Oral Daily    guaifenesin 100 mg/5 ml  200 mg Per NG tube Q4H    methylPREDNISolone sodium succinate  24 mg Intravenous Q12H     PRN Meds:acetaminophen, ALPRAZolam, dextrose 50%, dextrose 50%, glucagon (human recombinant), glucose, glucose, hepatitis B, hydrALAZINE, insulin aspart U-100, ondansetron, oxyCODONE, pneumoc 13-brandin conj-dip cr(PF), polyethylene glycol, promethazine, sodium chloride, sodium chloride 0.9%, sodium chloride 0.9%     Review of Systems   Constitutional: Positive for fatigue. Negative for activity change, appetite change, chills, diaphoresis, fever and unexpected weight change.   HENT: Positive for facial swelling and trouble swallowing. Negative for congestion, ear discharge, hearing loss, postnasal drip, sinus pressure, sneezing, sore throat and tinnitus.    Eyes: Negative for photophobia, pain and redness.   Respiratory: Negative for apnea, cough, choking, chest tightness, shortness of breath and stridor.    Cardiovascular: Negative for chest pain, palpitations and leg swelling.   Gastrointestinal: Negative for abdominal pain, anal bleeding, constipation, diarrhea, nausea and rectal pain.   Endocrine: Negative for polyuria.   Genitourinary: Negative for dysuria and hematuria.   Musculoskeletal: Negative for arthralgias, back pain, gait problem, joint swelling, myalgias, neck pain and neck stiffness.   Skin: Positive for rash. Negative for color change and pallor.    Allergic/Immunologic: Negative for immunocompromised state.   Neurological: Positive for weakness. Negative for dizziness, seizures and numbness.   Hematological: Positive for adenopathy. Does not bruise/bleed easily.   Psychiatric/Behavioral: Negative for agitation and behavioral problems.     Objective:     Vital Signs (Most Recent):  Temp: 97.9 °F (36.6 °C) (02/06/19 0750)  Pulse: 103 (02/06/19 0750)  Resp: 17 (02/06/19 0750)  BP: 128/61 (02/06/19 0750)  SpO2: 97 % (02/06/19 0750) Vital Signs (24h Range):  Temp:  [97.9 °F (36.6 °C)-99.5 °F (37.5 °C)] 97.9 °F (36.6 °C)  Pulse:  [] 103  Resp:  [17-20] 17  SpO2:  [96 %-100 %] 97 %  BP: (120-137)/(61-71) 128/61     Weight: 74.5 kg (164 lb 3.9 oz)  Body mass index is 27.33 kg/m².  Body surface area is 1.85 meters squared.      Intake/Output Summary (Last 24 hours) at 2/6/2019 0839  Last data filed at 2/6/2019 0600  Gross per 24 hour   Intake 1240 ml   Output 800 ml   Net 440 ml       Physical Exam   Constitutional: She is oriented to person, place, and time. She appears well-developed and well-nourished. No distress.   HENT:   Head: Atraumatic.   Nose: Nose normal.   Mouth/Throat: No oropharyngeal exudate.   Face appears swollen   Eyes: Conjunctivae and EOM are normal. Pupils are equal, round, and reactive to light. Right eye exhibits no discharge. Left eye exhibits no discharge. No scleral icterus.   Neck: Normal range of motion. Neck supple. No tracheal deviation present.   Cardiovascular: Normal rate, regular rhythm and intact distal pulses. Exam reveals no gallop and no friction rub.   Pulmonary/Chest: Effort normal and breath sounds normal. No respiratory distress. She has no wheezes. She has no rales. She exhibits no tenderness.   Abdominal: Soft. Bowel sounds are normal. She exhibits no distension and no mass. There is no tenderness. There is no rebound and no guarding.   Musculoskeletal: Normal range of motion. She exhibits edema (facial edema, upper  extremity edema b/l, resolving). She exhibits no tenderness or deformity.   Neurological: She is alert and oriented to person, place, and time. No cranial nerve deficit or sensory deficit.     4.5/5 shoulder strength on shoulder abduction; improving  5/5 flexion and extension preserved at elbow   Skin: Skin is warm and dry. Capillary refill takes less than 2 seconds. No rash noted. She is not diaphoretic. No erythema. No pallor.   Psychiatric: She has a normal mood and affect.   Vitals reviewed.      Significant Labs:   BMP:   Recent Labs   Lab 02/05/19 0455 02/06/19 0409   * 140*   * 132*   K 4.2 4.2   CL 97 99   CO2 29 28   BUN 24* 25*   CREATININE 0.5 0.6   CALCIUM 8.5* 8.8   MG 1.9 1.9   , CBC:   Recent Labs   Lab 02/05/19 0455 02/06/19 0409   WBC 10.35 11.62   HGB 7.9* 7.7*   HCT 25.1* 24.8*    160   , CMP:   Recent Labs   Lab 02/05/19 0455 02/06/19  0409   * 132*   K 4.2 4.2   CL 97 99   CO2 29 28   * 140*   BUN 24* 25*   CREATININE 0.5 0.6   CALCIUM 8.5* 8.8   PROT 6.6 6.7   ALBUMIN 2.5* 2.6*   BILITOT 0.3 0.3   ALKPHOS 78 79   AST 87* 90*   ALT 33 36   ANIONGAP 5* 5*   EGFRNONAA >60.0 >60.0   , Coagulation: No results for input(s): PT, INR, APTT in the last 48 hours., Haptoglobin:   No results for input(s): HAPTOGLOBIN in the last 48 hours., Immunology: No results for input(s): SPEP, ERVIN, DARCY, FREELAMBDALI in the last 48 hours., LDH: No results for input(s): LDHCSF, BFSOURCE in the last 48 hours., LFTs:   Recent Labs   Lab 02/05/19 0455 02/06/19 0409   ALT 33 36   AST 87* 90*   ALKPHOS 78 79   BILITOT 0.3 0.3   PROT 6.6 6.7   ALBUMIN 2.5* 2.6*   , Reticulocytes:   No results for input(s): RETIC in the last 48 hours., Tumor Markers: No results for input(s): PSA, CEA, , AFPTM, [id number],  in the last 48 hours.    Invalid input(s): ALGTM, Uric Acid No results for input(s): URICACID in the last 48 hours., Urine Studies: No results for input(s): COLORU, APPEARANCEUA,  PHUR, SPECGRAV, PROTEINUA, GLUCUA, KETONESU, BILIRUBINUA, OCCULTUA, NITRITE, UROBILINOGEN, LEUKOCYTESUR, RBCUA, WBCUA, BACTERIA, SQUAMEPITHEL, HYALINECASTS in the last 48 hours.    Invalid input(s): YANET,   Recent Lab Results       02/06/19  0409        Immature Granulocytes 0.7     Immature Grans (Abs) 0.08  Comment:  Mild elevation in immature granulocytes is non specific and   can be seen in a variety of conditions including stress response,   acute inflammation, trauma and pregnancy. Correlation with other   laboratory and clinical findings is essential.       Albumin 2.6     Aldolase 3.5     Alkaline Phosphatase 79     ALT 36     Anion Gap 5     AST 90     Baso # 0.01     Basophil% 0.1     Total Bilirubin 0.3  Comment:  For infants and newborns, interpretation of results should be based  on gestational age, weight and in agreement with clinical  observations.  Premature Infant recommended reference ranges:  Up to 24 hours.............<8.0 mg/dL  Up to 48 hours............<12.0 mg/dL  3-5 days..................<15.0 mg/dL  6-29 days.................<15.0 mg/dL       BUN, Bld 25     Calcium 8.8     Chloride 99     CO2 28          Creatinine 0.6     Differential Method Automated     eGFR if African American >60.0     eGFR if non  >60.0  Comment:  Calculation used to obtain the estimated glomerular filtration  rate (eGFR) is the CKD-EPI equation.        Eos # 0.0     Eosinophil% 0.0     Glucose 140     Gran # (ANC) 10.2     Gran% 87.7     Hematocrit 24.8     Hemoglobin 7.7     Lymph # 0.7     Lymph% 5.6     Magnesium 1.9     MCH 26.7     MCHC 31.0     MCV 86     Mono # 0.7     Mono% 5.9     MPV 11.0     nRBC 0     Phosphorus 2.9     Platelets 160     Potassium 4.2     Total Protein 6.7     RBC 2.88     RDW 19.3     Sodium 132     WBC 11.62        and All pertinent labs from the last 24 hours have been reviewed.    Diagnostic Results:  I have reviewed and interpreted all pertinent imaging  results/findings within the past 24 hours.

## 2019-02-06 NOTE — PLAN OF CARE
Problem: SLP Goal  Goal: SLP Goal  Speech-Language Pathology   Goals expected to be met by 2/20  1. Pt will complete dysphagia exercises x10 each with good ability to strengthen the swallowing musculature.   2. When deemed appropriate by SLP, pt with participate in ongoing assessment of swallow to determine readiness for repeat MBSS.     Outcome: Ongoing (interventions implemented as appropriate)    MBSS complete. Recommend ongoing NPO. Team may wish to consider longer term alternate means nutrition, hydration, medication.     IRENA Khan, CCC-SLP  [phone]  2/6/2019

## 2019-02-06 NOTE — ASSESSMENT & PLAN NOTE
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   - F/u 1/24 skin biopsy. Pending results may need muscle biopsy by general surgery  -MRI left humerus suspicious for myositis    Per rheumatology  - Methylpred 125mg IV bid started, now tapered to 24mg IV bid  - MTX 15mg subQ qWeekly , next dose 2/6/19, increasing future doses to 20mg subQ per rheum.  - IVIG x5 days completed 2/3/19 will require 1x maintenance dose 4 weeks from 2/3 per rheum  - myomarker panel, HMGCR, QG-TB pending  - PFTs as OP  - Quantitative IgA 533, IgG 932, IgM 66  - ID consulted for fast track vacc  - f/u with Dr. Swift and Dr. Campos in Rheumatology at discharge  - Currently tapering steroids per rheumatology, transition to PO medrol 48mg daily once swallow study passed    Lab Results   Component Value Date     (H) 02/06/2019     (H) 02/05/2019     (H) 02/04/2019     (H) 02/03/2019     (H) 02/02/2019

## 2019-02-06 NOTE — ASSESSMENT & PLAN NOTE
57yo F with history of L sided infiltrating ductal carcinoma of the L breast (dx on Jan 2017), HTN, depression, and gastritis was send from hem/onc clinic for evaluation of possible inflammatory myositis.      Patient started on Atelizumab/Abraxane on 11/7/18. Per chart review, patient noticed rashes on the dorsum of her hands on 11/11/18. Seen in urgent care and given topical steroid cream. Then received two more infusions on Atelizumab/Abraxane on 11/21/18 and 12/5/18. Started to notice puffiness around the eyes on 12/11/18. Received another Abraxane infusion on 12/12/18 but this time with hydrocortisone 50 mg which helped reduce the swelling around the eyes. Developed swelling of the face again on 12/15/18. Next infusion of Abraxane done on 12/19/18 with Solucortef and Atelizumab held. Abraxane given again on 1/3/19 with no IV steroid. Patient developed swelling of the face on 1/4/19 and went to urgent care and given short course of prednisone 20 mg BID. On 1/15/19, patient seen in hem/onc clinic with c/o of pressure and tightness around neck. CT scan of chest and neck did not reveal any vascular compression. Started on prednisone 60 mg with taper. On 1/22/18 patient with c/o proximal muscle weakness. CPK found to be elevated around 4k. Patient admitted and given solumedrol 80 mg IV on 1/22/18. Last infusion of Atelizumab on 12/19/18. Last infusion of Abraxane on 1/3/19. Given Solumedrol 1g x 1 on 1/23/18. Seen by Dermatology on 1/24/18 and biopsy done of skin rash. Given distribution of rashes, there was concern for dermatomyositis. Patient started on Solumedrol 125 mg IV BID at this time.      Labs: ESR 50, CRP 31.1, CPK 4562 (trending down), Aldolase 13.6.  , ALT 64.  UA normal.  CBC unremarkable.  +DARCY 1:640 speckled with negative profile.  Complements normal. Normal GGT. RPR, HIV negative. Hep B/C negative.  Strongyloides negative. HMGCR antibody negative.  Ferritin elevated at 641, iron on low side  "at 37, tibc low at 247, transferrin low 167, haptoglobin 153, retic slightly increased at 2.6%, ldh increased at 325. quantTB: indeterminate     Spirometry: normal, normal diffusion capacity. FVC: 81%, DLCO: 114%     Case reports of docetaxel related inflammatory myositis had been documented. "...taxanes have been noted to cause disabling but transient arthralgia and myalgias; it is important to consider the possibility of inflammatory myopathy as a possible complication in patients undergoing treatment with these agents." - A case of docetaxel induced myositis and review of literature. Austin et al 2015.     Case reports in Rheumatology   Case reports of atezolizumb (immunomodulator) cause of myositis  - Lucy et al 2017 "Myositis as an adverse even of immune checkpoint blockade for cancer therapy.  Seminars in Arthritis and Rheumatism      Patient exhibits signs of dermatomyositis (heliotropic rash and gottron's papules).   Dermatomyositis can be associated with malignancy.  MRI of LUE showed edema of the deltoid and biceps.  Exam with proximal muscle weakness: b/l deltoids 4/5, b/l iliopsoas 4.4/5. 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     Failed 2nd swallow eval on 2/6/19. Will be going for PEG placement     Inpatient treatments so far:  - Solumedrol 80 mg IV x 1 on 1/22/19  - Solumedrol 1 g IV x 1 on 1/23/19.  - Solumedrol 125 mg IV BID since 1/24/19 -->being tapered down.  - IVIG 1/29/19-2/2     Plan:  - chemotherapy can be re-started as it may help treat Dermatomyositis. Would recommend against checkpoint inhibitor, however, as it may have been a trigger for the dermatomyositis  - continue to monitor CPK, aldolase, AST/ALT  - Change Solumedrol to 24 mg IV q 12h. Once " swallowing re-evaluated can change to Medrol 48 mg oral daily.  - completed IVIg 400 mg/kg daily x 5 days. Will need IVIg 1 g/kg on 2/28/19.   - should get colonoscopy as outpatient as part of malignancy work-up   - MTX to 20 mg sc weekly (Tuesdays) with daily folic acid.   - check TB-spot (quant TB indeterminate)  - recommend Rehab after discharge to help with strengthening.  - recommend ID fast track consult for vaccinations.  - f/u esophageal biopsy results  - f/u myomarker panel   - PFTs with lung volumes as outpatient.   - 1200 mg dietary calcium and Vitamin D3 1000 mg daily  - continue H2 blocker  - f/u with Dr. Swift and Dr. Campos in Rheumatology on 2/15/19 at 2PM

## 2019-02-06 NOTE — PLAN OF CARE
Problem: Adult Inpatient Plan of Care  Goal: Plan of Care Review  Outcome: Ongoing (interventions implemented as appropriate)  Patient remains free from falls and injury this shift. Bed in low, locked position with call light in reach.  at bedside. Patient encouraged to call for assistance when getting out of bed. Patient verbalized understanding. Pt NG tube removed per MD order, pt has sore throat, but states she is less congested, breathing better, and swallowing with more ease. Pt at barium swallow procedure to elevate swallowing at this time.  All belongings within reach will continue to monitor.'

## 2019-02-06 NOTE — SUBJECTIVE & OBJECTIVE
Interval History: Patient seen at bedside. Unable to pass swallow eval and will be getting PEG tube. Get second MTX weekly injection last night; tolerated well. Strength improving. No new rashes. No trouble breathing, f/c, n/v/d.     Current Facility-Administered Medications   Medication Frequency    acetaminophen tablet 650 mg Q4H PRN    ALPRAZolam tablet 0.25 mg TID PRN    amLODIPine tablet 10 mg Daily    calcium-vitamin D3 500 mg(1,250mg) -200 unit per tablet 1 tablet BID    dextrose 50% injection 12.5 g PRN    dextrose 50% injection 25 g PRN    enoxaparin injection 40 mg Daily    escitalopram oxalate tablet 10 mg Daily    famotidine tablet 20 mg BID    folic acid tablet 1 mg Daily    glucagon (human recombinant) injection 1 mg PRN    glucose chewable tablet 16 g PRN    glucose chewable tablet 24 g PRN    guaifenesin 100 mg/5 ml syrup 200 mg Q4H    hepatitis B (HEPLISAV-B) 20 mcg/0.5 mL vaccine 0.5 mL vaccine x 1 dose    hydrALAZINE injection 10 mg Q6H PRN    insulin aspart U-100 pen 0-5 Units QID (AC + HS) PRN    methylPREDNISolone sodium succinate injection 24 mg Q12H    ondansetron disintegrating tablet 8 mg Q8H PRN    oxyCODONE immediate release tablet 5 mg Q6H PRN    pneumoc 13-brandin conj-dip cr(PF) (PREVNAR 13 (PF)) 0.5 mL vaccine x 1 dose    polyethylene glycol packet 17 g BID PRN    promethazine tablet 12.5 mg Q6H PRN    sodium chloride 0.65 % nasal spray 1 spray PRN    sodium chloride 0.9% flush 10 mL PRN    sodium chloride 0.9% flush 5 mL PRN     Objective:     Vital Signs (Most Recent):  Temp: 97.9 °F (36.6 °C) (02/06/19 1235)  Pulse: 98 (02/06/19 1235)  Resp: 18 (02/06/19 1235)  BP: 139/68 (02/06/19 1235)  SpO2: 97 % (02/06/19 1235)  O2 Device (Oxygen Therapy): room air (02/06/19 1040) Vital Signs (24h Range):  Temp:  [97.9 °F (36.6 °C)-99.5 °F (37.5 °C)] 97.9 °F (36.6 °C)  Pulse:  [] 98  Resp:  [17-20] 18  SpO2:  [96 %-98 %] 97 %  BP: (124-139)/(61-71) 139/68     Weight:  74.5 kg (164 lb 3.9 oz) (02/06/19 0358)  Body mass index is 27.33 kg/m².  Body surface area is 1.85 meters squared.      Intake/Output Summary (Last 24 hours) at 2/6/2019 1406  Last data filed at 2/6/2019 1400  Gross per 24 hour   Intake 450 ml   Output 1100 ml   Net -650 ml       Physical Exam   Constitutional: She is oriented to person, place, and time and well-developed, well-nourished, and in no distress. No distress.   HENT:   Head: Normocephalic and atraumatic.   Right Ear: External ear normal.   Left Ear: External ear normal.   Bilateral orbital edema with heliotropic rash - resolved   Eyes: Conjunctivae and EOM are normal. Pupils are equal, round, and reactive to light.   Neck: Normal range of motion. Neck supple.   Cardiovascular: Normal rate, regular rhythm, normal heart sounds and intact distal pulses.    Pulmonary/Chest: Effort normal and breath sounds normal. No respiratory distress.   Abdominal: Soft. Bowel sounds are normal.       Right Side Rheumatological Exam     Muscle Strength (0-5 scale):  Neck Flexion:  4.8  Neck Extension: 5  Deltoid:  4.4  Biceps: 5/5   Triceps:  4.4  : 5/5   Iliopsoas: 4.6  Quadriceps:  5   Distal Lower Extremity: 5    Left Side Rheumatological Exam     Muscle Strength (0-5 scale):  Neck Flexion:  4.8  Neck Extension: 5  Deltoid:  4.4  Biceps: 5/5   Triceps:  4.4  :  5/5   Iliopsoas: 4.6  Quadriceps:  5   Distal Lower Extremity: 5      Neurological: She is alert and oriented to person, place, and time. GCS score is 15.   Skin: Skin is warm and dry. No rash noted.     Hyperpigmented non raised rash over nape of neck, elbows, knuckles (resemble Gottron's papules), thighs, and ankles -resolving       Psychiatric: Mood, memory, affect and judgment normal.   Musculoskeletal: Normal range of motion. She exhibits edema. She exhibits no tenderness or deformity.         Significant Labs:  Recent Results (from the past 48 hour(s))   Comprehensive Metabolic Panel (CMP)     Collection Time: 02/05/19  4:55 AM   Result Value Ref Range    Sodium 131 (L) 136 - 145 mmol/L    Potassium 4.2 3.5 - 5.1 mmol/L    Chloride 97 95 - 110 mmol/L    CO2 29 23 - 29 mmol/L    Glucose 115 (H) 70 - 110 mg/dL    BUN, Bld 24 (H) 6 - 20 mg/dL    Creatinine 0.5 0.5 - 1.4 mg/dL    Calcium 8.5 (L) 8.7 - 10.5 mg/dL    Total Protein 6.6 6.0 - 8.4 g/dL    Albumin 2.5 (L) 3.5 - 5.2 g/dL    Total Bilirubin 0.3 0.1 - 1.0 mg/dL    Alkaline Phosphatase 78 55 - 135 U/L    AST 87 (H) 10 - 40 U/L    ALT 33 10 - 44 U/L    Anion Gap 5 (L) 8 - 16 mmol/L    eGFR if African American >60.0 >60 mL/min/1.73 m^2    eGFR if non African American >60.0 >60 mL/min/1.73 m^2   Magnesium    Collection Time: 02/05/19  4:55 AM   Result Value Ref Range    Magnesium 1.9 1.6 - 2.6 mg/dL   Phosphorus    Collection Time: 02/05/19  4:55 AM   Result Value Ref Range    Phosphorus 2.9 2.7 - 4.5 mg/dL   CK    Collection Time: 02/05/19  4:55 AM   Result Value Ref Range     (H) 20 - 180 U/L   CBC auto differential    Collection Time: 02/05/19  4:55 AM   Result Value Ref Range    WBC 10.35 3.90 - 12.70 K/uL    RBC 2.90 (L) 4.00 - 5.40 M/uL    Hemoglobin 7.9 (L) 12.0 - 16.0 g/dL    Hematocrit 25.1 (L) 37.0 - 48.5 %    MCV 87 82 - 98 fL    MCH 27.2 27.0 - 31.0 pg    MCHC 31.5 (L) 32.0 - 36.0 g/dL    RDW 19.3 (H) 11.5 - 14.5 %    Platelets 152 150 - 350 K/uL    MPV 11.1 9.2 - 12.9 fL    Immature Granulocytes 0.8 (H) 0.0 - 0.5 %    Gran # (ANC) 8.7 (H) 1.8 - 7.7 K/uL    Immature Grans (Abs) 0.08 (H) 0.00 - 0.04 K/uL    Lymph # 0.8 (L) 1.0 - 4.8 K/uL    Mono # 0.8 0.3 - 1.0 K/uL    Eos # 0.0 0.0 - 0.5 K/uL    Baso # 0.00 0.00 - 0.20 K/uL    nRBC 0 0 /100 WBC    Gran% 83.8 (H) 38.0 - 73.0 %    Lymph% 7.7 (L) 18.0 - 48.0 %    Mono% 7.7 4.0 - 15.0 %    Eosinophil% 0.0 0.0 - 8.0 %    Basophil% 0.0 0.0 - 1.9 %    Differential Method Automated    Aldolase    Collection Time: 02/05/19  4:55 AM   Result Value Ref Range    Aldolase 3.1 1.2 - 7.6 U/L    Comprehensive Metabolic Panel (CMP)    Collection Time: 02/06/19  4:09 AM   Result Value Ref Range    Sodium 132 (L) 136 - 145 mmol/L    Potassium 4.2 3.5 - 5.1 mmol/L    Chloride 99 95 - 110 mmol/L    CO2 28 23 - 29 mmol/L    Glucose 140 (H) 70 - 110 mg/dL    BUN, Bld 25 (H) 6 - 20 mg/dL    Creatinine 0.6 0.5 - 1.4 mg/dL    Calcium 8.8 8.7 - 10.5 mg/dL    Total Protein 6.7 6.0 - 8.4 g/dL    Albumin 2.6 (L) 3.5 - 5.2 g/dL    Total Bilirubin 0.3 0.1 - 1.0 mg/dL    Alkaline Phosphatase 79 55 - 135 U/L    AST 90 (H) 10 - 40 U/L    ALT 36 10 - 44 U/L    Anion Gap 5 (L) 8 - 16 mmol/L    eGFR if African American >60.0 >60 mL/min/1.73 m^2    eGFR if non African American >60.0 >60 mL/min/1.73 m^2   Magnesium    Collection Time: 02/06/19  4:09 AM   Result Value Ref Range    Magnesium 1.9 1.6 - 2.6 mg/dL   Phosphorus    Collection Time: 02/06/19  4:09 AM   Result Value Ref Range    Phosphorus 2.9 2.7 - 4.5 mg/dL   CK    Collection Time: 02/06/19  4:09 AM   Result Value Ref Range     (H) 20 - 180 U/L   CBC auto differential    Collection Time: 02/06/19  4:09 AM   Result Value Ref Range    WBC 11.62 3.90 - 12.70 K/uL    RBC 2.88 (L) 4.00 - 5.40 M/uL    Hemoglobin 7.7 (L) 12.0 - 16.0 g/dL    Hematocrit 24.8 (L) 37.0 - 48.5 %    MCV 86 82 - 98 fL    MCH 26.7 (L) 27.0 - 31.0 pg    MCHC 31.0 (L) 32.0 - 36.0 g/dL    RDW 19.3 (H) 11.5 - 14.5 %    Platelets 160 150 - 350 K/uL    MPV 11.0 9.2 - 12.9 fL    Immature Granulocytes 0.7 (H) 0.0 - 0.5 %    Gran # (ANC) 10.2 (H) 1.8 - 7.7 K/uL    Immature Grans (Abs) 0.08 (H) 0.00 - 0.04 K/uL    Lymph # 0.7 (L) 1.0 - 4.8 K/uL    Mono # 0.7 0.3 - 1.0 K/uL    Eos # 0.0 0.0 - 0.5 K/uL    Baso # 0.01 0.00 - 0.20 K/uL    nRBC 0 0 /100 WBC    Gran% 87.7 (H) 38.0 - 73.0 %    Lymph% 5.6 (L) 18.0 - 48.0 %    Mono% 5.9 4.0 - 15.0 %    Eosinophil% 0.0 0.0 - 8.0 %    Basophil% 0.1 0.0 - 1.9 %    Differential Method Automated    Aldolase    Collection Time: 02/06/19  4:09 AM   Result Value Ref  Range    Aldolase 3.5 1.2 - 7.6 U/L   Results for ROMEO PEREZ (MRN [id number]) as of 2/7/2019 11:28   Ref. Range 1/31/2019 04:00 2/1/2019 04:12 2/2/2019 04:44 2/3/2019 03:29 2/4/2019 04:06 2/5/2019 04:55 2/6/2019 04:09 2/7/2019 03:50   CPK Latest Ref Range: 20 - 180 U/L 1147 (H) 827 (H) 662 (H) 517 (H) 465 (H) 434 (H) 448 (H) 357 (H)       Results for ROMEO PEREZ (MRN [id number]) as of 1/28/2019 10:23   Ref. Range 1/24/2019 03:40 1/25/2019 04:20 1/26/2019 04:00 1/27/2019 04:21 1/28/2019 04:00   Aldolase Latest Ref Range: 1.2 - 7.6 U/L 12.7 (H) 14.2 (H) 13.7 (H) 13.1 (H) 15.4 (H)   Results for ROMEO PEREZ (MRN [id number]) as of 1/28/2019 10:23   Ref. Range 1/22/2019 22:24   Sed Rate Latest Ref Range: 0 - 36 mm/Hr 50 (H)     Results for ROMEO PEREZ (MRN [id number]) as of 1/28/2019 10:23   Ref. Range 1/22/2019 22:24   CRP Latest Ref Range: 0.0 - 8.2 mg/L 31.1 (H)      Quant TB 1/30/19: indeterminate    Results for ROMEO PEREZ (MRN [id number]) as of 1/28/2019 10:23   Ref. Range 1/22/2019 22:24 1/25/2019 17:33   DARCY HEP-2 Titer Unknown Positive 1:640 Sp...    Anti-SSA Antibody Latest Ref Range: 0.00 - 19.99 EU 0.49    Anti-SSA Interpretation Latest Ref Range: Negative  Negative    Anti-SSB Antibody Latest Ref Range: 0.00 - 19.99 EU 0.11    Anti-SSB Interpretation Latest Ref Range: Negative  Negative    ds DNA Ab Latest Ref Range: Negative 1:10  Negative 1:10    Anti Sm Antibody Latest Ref Range: 0.00 - 19.99 EU 0.58    Anti-Sm Interpretation Latest Ref Range: Negative  Negative    Anti Sm/RNP Antibody Latest Ref Range: 0.00 - 19.99 EU 0.62    Anti-Sm/RNP Interpretation Latest Ref Range: Negative  Negative    DARCY Screen Latest Ref Range: Negative <1:160  Positive (A)    Complement (C-3) Latest Ref Range: 50 - 180 mg/dL  106   Complement (C-4) Latest Ref Range: 11 - 44 mg/dL  29   Meg-1 Autoantibodies Latest Ref Range: <1.0 Index <1.00    Results for ROMEO PEREZ (MRN [id number]) as of 1/28/2019  10:23   Ref. Range 1/23/2019 02:55 1/23/2019 02:56   Specimen UA Unknown Urine, Clean Catch    Color, UA Latest Ref Range: Yellow, Straw, [place]  Colorless (A)    pH, UA Latest Ref Range: 5.0 - 8.0  6.0    Specific Gravity, UA Latest Ref Range: 1.005 - 1.030  1.005    Appearance, UA Latest Ref Range: Clear  Clear    Protein, UA Latest Ref Range: Negative  Negative    Glucose, UA Latest Ref Range: Negative  Negative    Ketones, UA Latest Ref Range: Negative  Negative    Occult Blood UA Latest Ref Range: Negative  Negative    Nitrite, UA Latest Ref Range: Negative  Negative    Bilirubin (UA) Latest Ref Range: Negative  Negative    Leukocytes, UA Latest Ref Range: Negative  Trace (A)    RBC, UA Latest Ref Range: 0 - 4 /hpf  2   WBC, UA Latest Ref Range: 0 - 5 /hpf  3   Squam Epithel, UA Latest Units: /hpf  0   Microscopic Comment Unknown  SEE COMMENT     Meg-1: Negative    Significant Imaging:  MRI Humerus w/ and w/o contrast:  Impression       1. Diffuse edema and enhancement of the visualized left upper extremity musculature, most pronounced proximally, most notably of the deltoid and biceps musculature as detailed above.  Findings are nonspecific although could relate to a nonspecific myelitis particularly in light of patient's reported history.  Clinical correlation with appropriate history and lab markers advised.  2. No evidence of abscess or osteomyelitis.  This report was flagged in Epic as abnormal.     NM PET CT 12/27/18:  Impression       See above    Significant improvement in all the previously seen lymph nodes involving the left lower neck, supraclavicular region, axillary and retropectoral region.    Index left retropectoral SUV max 3.57, previously 27.2.     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 Biopsied.                        - Small hiatal hernia.                        - Normal  stomach.                        - Normal examined duodenum.  Recommendation:       - Return patient to hospital to for ongoing care.                        - Await pathology results.                        - Use a proton pump inhibitor PO BID.                        - The findings and recommendations were discussed                         with the designated responsible adult.                        - Repeat upper endoscopy in 8 weeks to check                         healing.

## 2019-02-06 NOTE — PROGRESS NOTES
[facility]  Hematology/Oncology  Progress Note    Patient Name: Ermelinda Verde  Admission Date: 1/22/2019  Hospital Length of Stay: 15 days  Code Status: Full Code     Subjective:     HPI:  Ms Verde is a 59 yo F with a prior diagnosis of L sided breast cancer, s/p 3 cycles of nab-paclitaxel and atezolizumab who presents from clinic with a roughly week long, multi-day history of proximal muscle weakness in the shoulder girdle muscles, bilateral and associated with mild tenderness. She reports generalized weakness, but strength impairment with the use of her upper extremities more than lower extremities, and her ADLs are intact. Her last PET scan in December 2018 showed almost complete resolution of her adenopathy, and she had been complaining of a rash, which had been attributed to Nab paclitaxel. Last week, 1/15, she had a CT neck/ since there was concern for facial swelling per symptoms, and the CT showed lymphadenopathy without airway or vascular compromise. 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 Most recently, the patient had been on 60mg of prednisone with a taper and had noted swelling, proximal weakness. She also notes some progressive swallowing difficulty, but attributes this to candidal thrush for which she takes nystatin scheduled. She reports poor PO intake preceding admission, dark, mario colored urine, but denies fevers or joint pain.      Onc History per Dr. Reyna:   She developed a palpable abnormality in her left breast in January 2017 which she noted on self-examination.  A diagnostic mammogram on January 19 showed a greater than 1 cm nodule in the upper outer portion of left breast.  By ultrasound this was lobulated and hypoechoic measuring 1.75 x 1.51 x 1.96 cm.     On January 24,  2017 a core needle biopsy was performed which showed infiltrating ductal carcinoma, high grade.  The tumor was ER negative, KY negative, and HER-2 negative.  A follow-up ultrasound on December 6 showed 2.5 x 2.2 x 1.5 cm left breast mass.  There was no abnormality noted in the left axilla.     She underwent sentinel lymph node biopsy on February 22.  That showed 4 negative lymph nodes.     She had 4 cycles of  Wilbur-adjuvantTaxotere and Cytoxan completed  on 5/9/17.     On June 26 she underwent left mastectomy.  That revealed 2 foci of invasive high-grade carcinoma measuring 14 mm and 1.5 mm.  Margins were negative.     She completed 4 cycles of adjuvant Adriamycin on September 12, 2017.     In October, she developed some left supraclavicular lymphadenopathy which turned out to be recurrence.     A fine-needle aspirate of the lymph node was performed on October 19th.  That showed metastatic carcinoma consistent with breast primary which was ER negative, KY negative and HER 2-negative.           Interval History:   NAEO, pt cough is improving. Still difficulties swalling, but working with SLP exercises. Dispo pending repeat swallow study today (2/6). Next dose MTX today    Oncology Treatment Plan:   OP BREAST DOCETAXEL Q3W    Medications:  Continuous Infusions:  Scheduled Meds:   amLODIPine  10 mg Per NG tube Daily    calcium-vitamin D3  1 tablet Oral BID    enoxaparin  40 mg Subcutaneous Daily    escitalopram oxalate  10 mg Per NG tube Daily    famotidine  20 mg Per NG tube BID    folic acid  1 mg Oral Daily    guaifenesin 100 mg/5 ml  200 mg Per NG tube Q4H    methylPREDNISolone sodium succinate  24 mg Intravenous Q12H     PRN Meds:acetaminophen, ALPRAZolam, dextrose 50%, dextrose 50%, glucagon (human recombinant), glucose, glucose, hepatitis B, hydrALAZINE, insulin aspart U-100, ondansetron, oxyCODONE, pneumoc 13-brandin conj-dip cr(PF), polyethylene glycol, promethazine, sodium chloride, sodium chloride 0.9%,  sodium chloride 0.9%     Review of Systems   Constitutional: Positive for fatigue. Negative for activity change, appetite change, chills, diaphoresis, fever and unexpected weight change.   HENT: Positive for facial swelling and trouble swallowing. Negative for congestion, ear discharge, hearing loss, postnasal drip, sinus pressure, sneezing, sore throat and tinnitus.    Eyes: Negative for photophobia, pain and redness.   Respiratory: Negative for apnea, cough, choking, chest tightness, shortness of breath and stridor.    Cardiovascular: Negative for chest pain, palpitations and leg swelling.   Gastrointestinal: Negative for abdominal pain, anal bleeding, constipation, diarrhea, nausea and rectal pain.   Endocrine: Negative for polyuria.   Genitourinary: Negative for dysuria and hematuria.   Musculoskeletal: Negative for arthralgias, back pain, gait problem, joint swelling, myalgias, neck pain and neck stiffness.   Skin: Positive for rash. Negative for color change and pallor.   Allergic/Immunologic: Negative for immunocompromised state.   Neurological: Positive for weakness. Negative for dizziness, seizures and numbness.   Hematological: Positive for adenopathy. Does not bruise/bleed easily.   Psychiatric/Behavioral: Negative for agitation and behavioral problems.     Objective:     Vital Signs (Most Recent):  Temp: 97.9 °F (36.6 °C) (02/06/19 0750)  Pulse: 103 (02/06/19 0750)  Resp: 17 (02/06/19 0750)  BP: 128/61 (02/06/19 0750)  SpO2: 97 % (02/06/19 0750) Vital Signs (24h Range):  Temp:  [97.9 °F (36.6 °C)-99.5 °F (37.5 °C)] 97.9 °F (36.6 °C)  Pulse:  [] 103  Resp:  [17-20] 17  SpO2:  [96 %-100 %] 97 %  BP: (120-137)/(61-71) 128/61     Weight: 74.5 kg (164 lb 3.9 oz)  Body mass index is 27.33 kg/m².  Body surface area is 1.85 meters squared.      Intake/Output Summary (Last 24 hours) at 2/6/2019 0839  Last data filed at 2/6/2019 0600  Gross per 24 hour   Intake 1240 ml   Output 800 ml   Net 440 ml        Physical Exam   Constitutional: She is oriented to person, place, and time. She appears well-developed and well-nourished. No distress.   HENT:   Head: Atraumatic.   Nose: Nose normal.   Mouth/Throat: No oropharyngeal exudate.   Face appears swollen   Eyes: Conjunctivae and EOM are normal. Pupils are equal, round, and reactive to light. Right eye exhibits no discharge. Left eye exhibits no discharge. No scleral icterus.   Neck: Normal range of motion. Neck supple. No tracheal deviation present.   Cardiovascular: Normal rate, regular rhythm and intact distal pulses. Exam reveals no gallop and no friction rub.   Pulmonary/Chest: Effort normal and breath sounds normal. No respiratory distress. She has no wheezes. She has no rales. She exhibits no tenderness.   Abdominal: Soft. Bowel sounds are normal. She exhibits no distension and no mass. There is no tenderness. There is no rebound and no guarding.   Musculoskeletal: Normal range of motion. She exhibits edema (facial edema, upper extremity edema b/l, resolving). She exhibits no tenderness or deformity.   Neurological: She is alert and oriented to person, place, and time. No cranial nerve deficit or sensory deficit.     4.5/5 shoulder strength on shoulder abduction; improving  5/5 flexion and extension preserved at elbow   Skin: Skin is warm and dry. Capillary refill takes less than 2 seconds. No rash noted. She is not diaphoretic. No erythema. No pallor.   Psychiatric: She has a normal mood and affect.   Vitals reviewed.      Significant Labs:   BMP:   Recent Labs   Lab 02/05/19 0455 02/06/19  0409   * 140*   * 132*   K 4.2 4.2   CL 97 99   CO2 29 28   BUN 24* 25*   CREATININE 0.5 0.6   CALCIUM 8.5* 8.8   MG 1.9 1.9   , CBC:   Recent Labs   Lab 02/05/19 0455 02/06/19  0409   WBC 10.35 11.62   HGB 7.9* 7.7*   HCT 25.1* 24.8*    160   , CMP:   Recent Labs   Lab 02/05/19 0455 02/06/19  0409   * 132*   K 4.2 4.2   CL 97 99   CO2 29 28    * 140*   BUN 24* 25*   CREATININE 0.5 0.6   CALCIUM 8.5* 8.8   PROT 6.6 6.7   ALBUMIN 2.5* 2.6*   BILITOT 0.3 0.3   ALKPHOS 78 79   AST 87* 90*   ALT 33 36   ANIONGAP 5* 5*   EGFRNONAA >60.0 >60.0   , Coagulation: No results for input(s): PT, INR, APTT in the last 48 hours., Haptoglobin:   No results for input(s): HAPTOGLOBIN in the last 48 hours., Immunology: No results for input(s): SPEP, ERVIN, DARCY, FREELAMBDALI in the last 48 hours., LDH: No results for input(s): LDHCSF, BFSOURCE in the last 48 hours., LFTs:   Recent Labs   Lab 02/05/19  0455 02/06/19  0409   ALT 33 36   AST 87* 90*   ALKPHOS 78 79   BILITOT 0.3 0.3   PROT 6.6 6.7   ALBUMIN 2.5* 2.6*   , Reticulocytes:   No results for input(s): RETIC in the last 48 hours., Tumor Markers: No results for input(s): PSA, CEA, , AFPTM, [id number],  in the last 48 hours.    Invalid input(s): ALGTM, Uric Acid No results for input(s): URICACID in the last 48 hours., Urine Studies: No results for input(s): COLORU, APPEARANCEUA, PHUR, SPECGRAV, PROTEINUA, GLUCUA, KETONESU, BILIRUBINUA, OCCULTUA, NITRITE, UROBILINOGEN, LEUKOCYTESUR, RBCUA, WBCUA, BACTERIA, SQUAMEPITHEL, HYALINECASTS in the last 48 hours.    Invalid input(s): YANET,   Recent Lab Results       02/06/19  0409        Immature Granulocytes 0.7     Immature Grans (Abs) 0.08  Comment:  Mild elevation in immature granulocytes is non specific and   can be seen in a variety of conditions including stress response,   acute inflammation, trauma and pregnancy. Correlation with other   laboratory and clinical findings is essential.       Albumin 2.6     Aldolase 3.5     Alkaline Phosphatase 79     ALT 36     Anion Gap 5     AST 90     Baso # 0.01     Basophil% 0.1     Total Bilirubin 0.3  Comment:  For infants and newborns, interpretation of results should be based  on gestational age, weight and in agreement with clinical  observations.  Premature Infant recommended reference ranges:  Up to 24  hours.............<8.0 mg/dL  Up to 48 hours............<12.0 mg/dL  3-5 days..................<15.0 mg/dL  6-29 days.................<15.0 mg/dL       BUN, Bld 25     Calcium 8.8     Chloride 99     CO2 28          Creatinine 0.6     Differential Method Automated     eGFR if African American >60.0     eGFR if non  >60.0  Comment:  Calculation used to obtain the estimated glomerular filtration  rate (eGFR) is the CKD-EPI equation.        Eos # 0.0     Eosinophil% 0.0     Glucose 140     Gran # (ANC) 10.2     Gran% 87.7     Hematocrit 24.8     Hemoglobin 7.7     Lymph # 0.7     Lymph% 5.6     Magnesium 1.9     MCH 26.7     MCHC 31.0     MCV 86     Mono # 0.7     Mono% 5.9     MPV 11.0     nRBC 0     Phosphorus 2.9     Platelets 160     Potassium 4.2     Total Protein 6.7     RBC 2.88     RDW 19.3     Sodium 132     WBC 11.62        and All pertinent labs from the last 24 hours have been reviewed.    Diagnostic Results:  I have reviewed and interpreted all pertinent imaging results/findings within the past 24 hours.    Assessment/Plan:     * Myositis    - DDX dermatomyositis de haylie vs related to immunotherapy  - trend CPK daily; downtrending generally with a slight bump on 01/27  -restarted fluids 01/26 due to NPO  - myositis labs suspicious for myosits; rheum on board.   - F/u 1/24 skin biopsy.  -MRI left humerus suspicious for myositis    Per rheumatology  - Methylpred 125mg IV bid started, now tapered to 24mg IV bid  - MTX 15mg subQ qWeekly , next dose 2/6/19, increasing future doses to 20mg subQ per rheum.  - IVIG x5 days completed 2/3/19 will require 1x maintenance dose 4 weeks from 2/3 per rheum  - myomarker panel, HMGCR, QG-TB pending  - PFTs as OP  - Quantitative IgA 533, IgG 932, IgM 66  - ID consulted for fast track vacc  - f/u with Dr. Swift and Dr. Campos in Rheumatology at discharge  - Currently tapering steroids per rheumatology, transition to PO medrol 48mg daily once swallow  study passed    Lab Results   Component Value Date     (H) 02/06/2019     (H) 02/05/2019     (H) 02/04/2019     (H) 02/03/2019     (H) 02/02/2019          Hypokalemia    Replete prn     Dysphagia    Patient is experiencing difficulty swallowing that has been increasing in severity over last couple of days to the point where patient did not feel as if she could swallow.  As of 01/26, SLP evaluated patient and determined that she should be NPO except for medications until a modified barium swallow could be performed. Possibly candidal esophagitis; patient has oral thrush.    Plan:  - High aspiration risk on modified barium study, strict NPO  - NGT in situ, tube feeds initiated per dietary consult  - Switched to IV meds where possible  - Fluconazole IVPB 200mg q24hr (now d/c'd)  - GI consulted, EGD resulted w/ Grade C erosive esophagitis, no candidal infxn identified. Bx obtained, GI recs start PPI bid  - Failed repeat swallow trial for NGT removal (2/4) SLP plans swallowing exercises, continue current steroid and immunosuppressive therapies, will repeat study on 2/6  - Currently famotidine 20 daily per NGT, when PO intake resumed switch to PPI PO bid per GI recs for esophagitis/ulcerations.     Swelling of upper arm    -B/L U/S negative for DVTs  -appears less edematous than day prior  -continue to monitor     Dermatitis    - Skin biopsy from 1/24 pending, appreciate dermatology recs     Candidiasis    Oral thrush  -cont Nystatin swish and swallow. Additionally, added fluconaozle 200 mg Po qday on 01/26 as possibility patient is experiencing esophagitis 2/2 to candida  -appears resolved now s/p fluconazole and nystatin     Rhabdomyolysis    -see myositis. Continue supportive care, Cr/ electrolyte/ CPK monitoring and aggressive hydration. transaminitis on CMP is likely due to skeletal muscle rhabdo, not hepatic     Drug rash    - Unclear if symptoms related to chemotherapy      Pre-diabetes    -check a1c, 0-5u SSI while on steroids, add prandial insulin as warranted.      Adjustment disorder with depressed mood    -cont home SSRI     Vitamin B12 deficiency    -cont home B12 supplementation      Breast cancer of upper-outer quadrant of left female breast    -On January 24, 2017 a core needle biopsy was performed which showed infiltrating ductal carcinoma, high grade.  The tumor was ER negative, TN negative, and HER-2 negative.  -She had 4 cycles of  Wilbur-adjuvantTaxotere and Cytoxan completed  on 5/9/17.  -On June 26 she underwent left mastectomy.  That revealed 2 foci of invasive high-grade carcinoma measuring 14 mm and 1.5 mm.  Margins were negative.  -She completed 4 cycles of adjuvant Adriamycin on September 12, 2017.  -  A fine-needle aspirate of the lymph node was performed on October 19th.  That showed metastatic carcinoma consistent with breast primary which was ER negative, TN negative and HER 2-negative.    -Per rheumatology: hold paclitaxel agent and atezolizumb at this time - both can cause myositis      Hypertension    -on home amlodipine-benazepril; will hold ACEi for now and observe renal function with CPK elevation in rhabdo.     Plan:  - Amlodipine 10 mg qday on admission  - 1/28 Pt now strict NPO due to failed swallow study, now on hydralazine 10mg IV q8h WCTM and adjust PRN              Noah Patel MD  Hematology/Oncology  [facility]          I have reviewed the notes, assessments, and/or procedures performed by the housestaff, as above.  I have personally interviewed and examined the patient at the beside, and rounded with the housestaff. I concur with her/his assessment and plan and the documentation of Ermelinda Verde.  I, Dr. Conrad Cheung, personally spent more than  35 mins during this encounter, greater than 50% was spent in direct counseling and/or coordination of care.     Conrad Cheung M.D., M.S., F.A.C.P.  Hematology/Oncology  Attending  [facility]

## 2019-02-06 NOTE — PLAN OF CARE
Problem: Adult Inpatient Plan of Care  Goal: Plan of Care Review  Outcome: Ongoing (interventions implemented as appropriate)  Pt A & O x4, no complaints. Pt has UE weakness. Pt has guaifenesin Q4. R chest port due for change tuesdays. Pt has NG tube in R araiza, otherwise NPO. Pt voids, last BM yesterday. Pt with non-skid footwear on, bed locked, in lowest position, call bell in reach, pt verbalized understanding.

## 2019-02-06 NOTE — PROCEDURES
Modified Barium Swallow    Patient Name:  Ermelinda Verde   [id number]  MRN:  [id number]    Recommendations:     Recommendations:                General Recommendations:  Dysphagia therapy  Diet recommendations:  NPO, NPO   Aspiration Precautions: Strict aspiration precautions   General Precautions: Standard, aspiration, fall, NPO    Referral     Reason for Referral  Patient was referred for a Modified Barium Swallow Study to assess the efficiency of his/her swallow function, rule out aspiration and make recommendations regarding safe dietary consistencies, effective compensatory strategies, and safe eating environment.     Diagnosis: Myositis     History:     Past Medical History:   Diagnosis Date    Breast cancer 01/01/2017    left    Depression     Diverticulosis     Gastritis     Hypertension     Vitamin B12 deficiency 3/8/2018     Objective:     Current Respiratory Status: 02/06/19    Alert: yes    Cooperative: yes    Follows Directions: yes    Visualization  · Patient was seen in the lateral view    Oral Peripheral Examination  · Oral Musculature: WFL  · Dentition: present and adequate  · Secretion Management: adequate  · Oral Labial Strength and Mobility: WFL  · Lingual Strength and Mobility: WFL  · Volitional Cough: strong and productive  · Volitional Swallow: strong  · Voice Prior to PO Intake: strong    Consistencies Assessed  · Thin liquid- tsp x2  · Pureed apple sauce- 1/2 tsp x1    Oral Preparation/Oral Phase  Across consistencies:  · Adequate bolus acceptance without anterior loss  · Timely initiation of bolus manipulation   · Timely A-P transit  · Dec'd bolus control and formation characterized by premature spillage to the oropharynx  · Reduced BOT retraction with trace-mild BOT residue    Pharyngeal Phase   · Pooling to the vallecula with spillover to the pyriform and delayed swallow initiation   · Unappreciated hyolaryngeal excursion/ elevation and epiglottic inversion despite appearance of max  effort  · Unappreciated pharyngeal contraction despite appearance of max effort  · Thin liquid- Aspiration prior to swallow initiation with unappreciated sensory response. Unappreciated hyolaryngeal excursion/ elevation, epiglottic inversion, and pharyngeal contraction resulted in severe vallecula and pyriform residue concerning for high risk for additional aspiration.  · Pureed solid- Although aspiration unappreciated under fluoroscopy, aspiration prior to swallow initiation highly suspected. Unappreciated hyolaryngeal excursion/ elevation, epiglottic inversion, and pharyngeal contraction resulted in entire bolus pooling to the vallecula then gradually spilling over to the pyriform. While gradually spilling over to the pyriform, significant portion of bolus observed to web between the vallecula and pyriform, with large portion entering the airway. Although cued volitional cough beneficial to eject portion of bolus which remained in the vallecula for pt to spit out, unsuccessful to eject large portion of bolus which entered the airway and was observed to gradually descend via gravity toward the vocal folds. Therefore eventual aspiration highly likely. Should pt have not completed cued volitional cough/ should cued volitional cough been of dec'd strength such that pt was unable to eject portion of bolus which remained in the vallecula, severe-profound vallecula and pyriform residue concerning for high risk for additional aspiration.   · Pt observed to intermittently, ind'ly implement chin tuck upon swallow to aid ability to trigger swallow. However unsuccessful to aid swallow initiation.     Cervical Esophageal Phase  · 2/2 severity of pyriform residue, unable to determine if pt with adequate vs dec'd UES relaxation for bolus acceptance. However dec'd UES relaxation for bolus acceptance suspected given severity of pharyngeal phase dysphagia s/p underlying medical dx of myositis   · Retrograde flow  unappreciated    Assessment:     Impressions  · Pt with moderate oral phase dysphagia and severe-profound pharyngeal phase dysphagia characterized by unappreciated hyolaryngeal excursion/ elevation, epiglottic inversion, and pharyngeal contraction resulting in aspiration of thin liquid and highly suspected aspiration of pureed solid.     Prognosis: Good with ongoing dysphagia therapy and compliance with SLP recommendations re: independent completion of dysphagia exercises.     Barriers:  · Severe-profound pharyngeal phase dysphagia warranting SLP recommendation for medical team to consider longer term alternate source nutrition, hydration, medication.     Plan  SLP to continue to follow pt to provide ongoing education and dysphagia therapy.     Education  Education provided to pt re: results of MBSS and ongoing SLP POC.     Goals:   Multidisciplinary Problems     SLP Goals        Problem: SLP Goal    Goal Priority Disciplines Outcome   SLP Goal     SLP Ongoing (interventions implemented as appropriate)   Description:  Speech-Language Pathology   Goals expected to be met by 2/20  1. Pt will complete dysphagia exercises x10 each with good ability to strengthen the swallowing musculature.   2. When deemed appropriate by SLP, pt with participate in ongoing assessment of swallow to determine readiness for repeat MBSS.                     Plan:     · Patient to be seen:  Therapy Frequency: 3 x/week   · Plan of Care expires:  02/24/19  · Plan of Care reviewed with:  patient        Discharge recommendations:  other (see comments)(OP [facility])     Time Tracking:   SLP Treatment Date:   02/06/19  Speech Start Time:  1456  Speech Stop Time:  1518     Speech Total Time (min):  22 min    IRENA Khan, CCC-SLP  [phone]  2/6/2019

## 2019-02-07 ENCOUNTER — ANESTHESIA (OUTPATIENT)
Dept: ENDOSCOPY | Facility: HOSPITAL | Age: 59
DRG: 598 | End: 2019-02-07
Payer: COMMERCIAL

## 2019-02-07 ENCOUNTER — TELEPHONE (OUTPATIENT)
Dept: HEMATOLOGY/ONCOLOGY | Facility: CLINIC | Age: 59
End: 2019-02-07

## 2019-02-07 ENCOUNTER — ANESTHESIA EVENT (OUTPATIENT)
Dept: ENDOSCOPY | Facility: HOSPITAL | Age: 59
DRG: 598 | End: 2019-02-07
Payer: COMMERCIAL

## 2019-02-07 LAB
ALBUMIN SERPL BCP-MCNC: 2.8 G/DL
ALP SERPL-CCNC: 66 U/L
ALT SERPL W/O P-5'-P-CCNC: 35 U/L
ANION GAP SERPL CALC-SCNC: 6 MMOL/L
AST SERPL-CCNC: 87 U/L
BASOPHILS # BLD AUTO: 0 K/UL
BASOPHILS NFR BLD: 0 %
BILIRUB SERPL-MCNC: 0.5 MG/DL
BUN SERPL-MCNC: 27 MG/DL
CALCIUM SERPL-MCNC: 9 MG/DL
CHLORIDE SERPL-SCNC: 100 MMOL/L
CK SERPL-CCNC: 357 U/L
CO2 SERPL-SCNC: 28 MMOL/L
CREAT SERPL-MCNC: 0.6 MG/DL
DIFFERENTIAL METHOD: ABNORMAL
EOSINOPHIL # BLD AUTO: 0 K/UL
EOSINOPHIL NFR BLD: 0 %
ERYTHROCYTE [DISTWIDTH] IN BLOOD BY AUTOMATED COUNT: 19.6 %
EST. GFR  (AFRICAN AMERICAN): >60 ML/MIN/1.73 M^2
EST. GFR  (NON AFRICAN AMERICAN): >60 ML/MIN/1.73 M^2
GLUCOSE SERPL-MCNC: 96 MG/DL
HCT VFR BLD AUTO: 24.4 %
HGB BLD-MCNC: 7.5 G/DL
IMM GRANULOCYTES # BLD AUTO: 0.06 K/UL
IMM GRANULOCYTES NFR BLD AUTO: 0.7 %
LYMPHOCYTES # BLD AUTO: 0.5 K/UL
LYMPHOCYTES NFR BLD: 5.9 %
MAGNESIUM SERPL-MCNC: 2.1 MG/DL
MCH RBC QN AUTO: 26.8 PG
MCHC RBC AUTO-ENTMCNC: 30.7 G/DL
MCV RBC AUTO: 87 FL
MONOCYTES # BLD AUTO: 0.5 K/UL
MONOCYTES NFR BLD: 5.9 %
NEUTROPHILS # BLD AUTO: 7.8 K/UL
NEUTROPHILS NFR BLD: 87.5 %
NRBC BLD-RTO: 0 /100 WBC
PHOSPHATE SERPL-MCNC: 4.1 MG/DL
PLATELET # BLD AUTO: 179 K/UL
PMV BLD AUTO: 10.8 FL
POTASSIUM SERPL-SCNC: 4.4 MMOL/L
PROT SERPL-MCNC: 6.8 G/DL
RBC # BLD AUTO: 2.8 M/UL
SODIUM SERPL-SCNC: 134 MMOL/L
WBC # BLD AUTO: 8.85 K/UL

## 2019-02-07 PROCEDURE — D9220A PRA ANESTHESIA: ICD-10-PCS | Mod: ANES,,, | Performed by: ANESTHESIOLOGY

## 2019-02-07 PROCEDURE — 99233 PR SUBSEQUENT HOSPITAL CARE,LEVL III: ICD-10-PCS | Mod: ,,, | Performed by: INTERNAL MEDICINE

## 2019-02-07 PROCEDURE — 20600001 HC STEP DOWN PRIVATE ROOM

## 2019-02-07 PROCEDURE — D9220A PRA ANESTHESIA: Mod: ANES,,, | Performed by: ANESTHESIOLOGY

## 2019-02-07 PROCEDURE — 97110 THERAPEUTIC EXERCISES: CPT

## 2019-02-07 PROCEDURE — D9220A PRA ANESTHESIA: Mod: CRNA,,, | Performed by: NURSE ANESTHETIST, CERTIFIED REGISTERED

## 2019-02-07 PROCEDURE — 25000003 PHARM REV CODE 250: Performed by: NURSE ANESTHETIST, CERTIFIED REGISTERED

## 2019-02-07 PROCEDURE — 99231 PR SUBSEQUENT HOSPITAL CARE,LEVL I: ICD-10-PCS | Mod: ,,, | Performed by: INTERNAL MEDICINE

## 2019-02-07 PROCEDURE — 85025 COMPLETE CBC W/AUTO DIFF WBC: CPT

## 2019-02-07 PROCEDURE — 27201089 HC SNARE, DISP (ANY): Performed by: INTERNAL MEDICINE

## 2019-02-07 PROCEDURE — 43246 EGD PLACE GASTROSTOMY TUBE: CPT | Mod: ,,, | Performed by: INTERNAL MEDICINE

## 2019-02-07 PROCEDURE — 37000009 HC ANESTHESIA EA ADD 15 MINS: Performed by: INTERNAL MEDICINE

## 2019-02-07 PROCEDURE — 25000003 PHARM REV CODE 250: Performed by: STUDENT IN AN ORGANIZED HEALTH CARE EDUCATION/TRAINING PROGRAM

## 2019-02-07 PROCEDURE — 43246 EGD PLACE GASTROSTOMY TUBE: CPT | Performed by: INTERNAL MEDICINE

## 2019-02-07 PROCEDURE — D9220A PRA ANESTHESIA: ICD-10-PCS | Mod: CRNA,,, | Performed by: NURSE ANESTHETIST, CERTIFIED REGISTERED

## 2019-02-07 PROCEDURE — 63600175 PHARM REV CODE 636 W HCPCS: Performed by: INTERNAL MEDICINE

## 2019-02-07 PROCEDURE — 63600175 PHARM REV CODE 636 W HCPCS: Performed by: NURSE ANESTHETIST, CERTIFIED REGISTERED

## 2019-02-07 PROCEDURE — 43246 PR EGD, FLEX, W/PLCMT, GASTROSTOMY TUBE: ICD-10-PCS | Mod: ,,, | Performed by: INTERNAL MEDICINE

## 2019-02-07 PROCEDURE — 27201018 HC KIT, PEG (ANY): Performed by: INTERNAL MEDICINE

## 2019-02-07 PROCEDURE — 37000008 HC ANESTHESIA 1ST 15 MINUTES: Performed by: INTERNAL MEDICINE

## 2019-02-07 PROCEDURE — 82550 ASSAY OF CK (CPK): CPT

## 2019-02-07 PROCEDURE — 63600175 PHARM REV CODE 636 W HCPCS: Performed by: STUDENT IN AN ORGANIZED HEALTH CARE EDUCATION/TRAINING PROGRAM

## 2019-02-07 PROCEDURE — 82085 ASSAY OF ALDOLASE: CPT

## 2019-02-07 PROCEDURE — 86481 TB AG RESPONSE T-CELL SUSP: CPT

## 2019-02-07 PROCEDURE — 80053 COMPREHEN METABOLIC PANEL: CPT

## 2019-02-07 PROCEDURE — 63600175 PHARM REV CODE 636 W HCPCS: Performed by: ANESTHESIOLOGY

## 2019-02-07 PROCEDURE — 84100 ASSAY OF PHOSPHORUS: CPT

## 2019-02-07 PROCEDURE — 99231 SBSQ HOSP IP/OBS SF/LOW 25: CPT | Mod: ,,, | Performed by: INTERNAL MEDICINE

## 2019-02-07 PROCEDURE — 99233 SBSQ HOSP IP/OBS HIGH 50: CPT | Mod: ,,, | Performed by: INTERNAL MEDICINE

## 2019-02-07 PROCEDURE — 83735 ASSAY OF MAGNESIUM: CPT

## 2019-02-07 RX ORDER — DEXTROSE MONOHYDRATE AND SODIUM CHLORIDE 5; .9 G/100ML; G/100ML
INJECTION, SOLUTION INTRAVENOUS CONTINUOUS
Status: ACTIVE | OUTPATIENT
Start: 2019-02-07 | End: 2019-02-08

## 2019-02-07 RX ORDER — PROPOFOL 10 MG/ML
VIAL (ML) INTRAVENOUS
Status: DISCONTINUED | OUTPATIENT
Start: 2019-02-07 | End: 2019-02-07

## 2019-02-07 RX ORDER — SODIUM CHLORIDE 9 MG/ML
INJECTION, SOLUTION INTRAVENOUS CONTINUOUS PRN
Status: DISCONTINUED | OUTPATIENT
Start: 2019-02-07 | End: 2019-02-07

## 2019-02-07 RX ORDER — MIDAZOLAM HYDROCHLORIDE 1 MG/ML
INJECTION, SOLUTION INTRAMUSCULAR; INTRAVENOUS
Status: DISCONTINUED | OUTPATIENT
Start: 2019-02-07 | End: 2019-02-07

## 2019-02-07 RX ORDER — FENTANYL CITRATE 50 UG/ML
25 INJECTION, SOLUTION INTRAMUSCULAR; INTRAVENOUS EVERY 5 MIN PRN
Status: DISCONTINUED | OUTPATIENT
Start: 2019-02-07 | End: 2019-02-07 | Stop reason: HOSPADM

## 2019-02-07 RX ORDER — PROPOFOL 10 MG/ML
VIAL (ML) INTRAVENOUS CONTINUOUS PRN
Status: DISCONTINUED | OUTPATIENT
Start: 2019-02-07 | End: 2019-02-07

## 2019-02-07 RX ORDER — LIDOCAINE HCL/PF 100 MG/5ML
SYRINGE (ML) INTRAVENOUS
Status: DISCONTINUED | OUTPATIENT
Start: 2019-02-07 | End: 2019-02-07

## 2019-02-07 RX ORDER — KETAMINE HYDROCHLORIDE 10 MG/ML
INJECTION, SOLUTION INTRAMUSCULAR; INTRAVENOUS
Status: DISCONTINUED | OUTPATIENT
Start: 2019-02-07 | End: 2019-02-07

## 2019-02-07 RX ORDER — SODIUM CHLORIDE 0.9 % (FLUSH) 0.9 %
3 SYRINGE (ML) INJECTION
Status: DISCONTINUED | OUTPATIENT
Start: 2019-02-07 | End: 2019-02-07 | Stop reason: HOSPADM

## 2019-02-07 RX ORDER — GLYCOPYRROLATE 0.2 MG/ML
INJECTION INTRAMUSCULAR; INTRAVENOUS
Status: DISCONTINUED | OUTPATIENT
Start: 2019-02-07 | End: 2019-02-07

## 2019-02-07 RX ORDER — CEFAZOLIN SODIUM 1 G/3ML
1 INJECTION, POWDER, FOR SOLUTION INTRAMUSCULAR; INTRAVENOUS ONCE
Status: COMPLETED | OUTPATIENT
Start: 2019-02-07 | End: 2019-02-07

## 2019-02-07 RX ORDER — PHENYLEPHRINE HYDROCHLORIDE 10 MG/ML
INJECTION INTRAVENOUS
Status: DISCONTINUED | OUTPATIENT
Start: 2019-02-07 | End: 2019-02-07

## 2019-02-07 RX ADMIN — PHENYLEPHRINE HYDROCHLORIDE 50 MCG: 10 INJECTION INTRAVENOUS at 03:02

## 2019-02-07 RX ADMIN — FENTANYL CITRATE 25 MCG: 50 INJECTION, SOLUTION INTRAMUSCULAR; INTRAVENOUS at 04:02

## 2019-02-07 RX ADMIN — LIDOCAINE HYDROCHLORIDE 100 MG: 20 INJECTION, SOLUTION INTRAVENOUS at 03:02

## 2019-02-07 RX ADMIN — PROPOFOL 80 MG: 10 INJECTION, EMULSION INTRAVENOUS at 03:02

## 2019-02-07 RX ADMIN — MIDAZOLAM HYDROCHLORIDE 1 MG: 1 INJECTION, SOLUTION INTRAMUSCULAR; INTRAVENOUS at 02:02

## 2019-02-07 RX ADMIN — DEXTROSE AND SODIUM CHLORIDE: 5; .9 INJECTION, SOLUTION INTRAVENOUS at 11:02

## 2019-02-07 RX ADMIN — CEFAZOLIN 1 G: 330 INJECTION, POWDER, FOR SOLUTION INTRAMUSCULAR; INTRAVENOUS at 02:02

## 2019-02-07 RX ADMIN — GLYCOPYRROLATE 0.1 MG: 0.2 INJECTION, SOLUTION INTRAMUSCULAR; INTRAVENOUS at 03:02

## 2019-02-07 RX ADMIN — SODIUM CHLORIDE: 0.9 INJECTION, SOLUTION INTRAVENOUS at 02:02

## 2019-02-07 RX ADMIN — METHYLPREDNISOLONE SODIUM SUCCINATE 24 MG: 125 INJECTION, POWDER, FOR SOLUTION INTRAMUSCULAR; INTRAVENOUS at 09:02

## 2019-02-07 RX ADMIN — KETAMINE HYDROCHLORIDE 20 MG: 10 INJECTION, SOLUTION INTRAMUSCULAR; INTRAVENOUS at 03:02

## 2019-02-07 RX ADMIN — PROPOFOL 20 MG: 10 INJECTION, EMULSION INTRAVENOUS at 03:02

## 2019-02-07 RX ADMIN — PROPOFOL 150 MCG/KG/MIN: 10 INJECTION, EMULSION INTRAVENOUS at 03:02

## 2019-02-07 RX ADMIN — METHYLPREDNISOLONE SODIUM SUCCINATE 24 MG: 125 INJECTION, POWDER, FOR SOLUTION INTRAMUSCULAR; INTRAVENOUS at 08:02

## 2019-02-07 NOTE — H&P
Endoscopy History and Physical    PCP - Reta Weeks MD     Procedure - EGD for PEG placement  ASA - per anesthesia  Mallampati - per anesthesia  History of Anesthesia problems - no  Family history Anesthesia problems -  no   Plan of anesthesia - General / MAC    HPI:  This is a 58 y.o. female here for evaluation of :     PEG placement, history of dysphagia.        ROS:  Constitutional: No fevers, chills, No weight loss  CV: No chest pain  Pulm: No cough, No shortness of breath  Ophtho: No vision changes  GI: see HPI  Derm: No rash    Medical History:  has a past medical history of Breast cancer (2017), Depression, Diverticulosis, Gastritis, Hypertension, and Vitamin B12 deficiency (3/8/2018).    Surgical History:  has a past surgical history that includes D&C;  section; Portacath placement; Rew lymph node biopsy (2017); Breast biopsy (Left); Myomectomy; Colonoscopy (2011); Breast reconstruction (Left, ); Total Reduction Mammoplasty (Right, ); insertion of tunneled central venous catheter (cvc) with subcutaneous port (Right, 10/31/2018); Mastectomy (Left, 2017); and Esophagogastroduodenoscopy (N/A, 2019).    Family History: family history includes Breast cancer (age of onset: 52) in her sister; Heart disease in her father; Hypertension in her father and mother; No Known Problems in her brother, brother, brother, daughter, sister, and son; Thyroid disease in her daughter.. Otherwise no colon cancer, inflammatory bowel disease, or GI malignancies.    Social History:  reports that  has never smoked. she has never used smokeless tobacco. She reports that she drinks alcohol. She reports that she does not use drugs.    Review of patient's allergies indicates:  No Known Allergies    Medications:   Medications Prior to Admission   Medication Sig Dispense Refill Last Dose    ALPRAZolam (XANAX) 0.25 MG tablet Take 1 tablet (0.25 mg total) by mouth 3 (three) times daily. 75  tablet 0 Taking    amlodipine-benazepril (LOTREL) 10-40 mg per capsule TK 1 C PO QD 90 capsule 3 Taking    CALCIUM CARBONATE (CALCIUM 500 ORAL) Take by mouth once daily.    Taking    cyanocobalamin (VITAMIN B-12) 500 MCG tablet Take 500 mcg by mouth once daily.   Taking    nystatin (MYCOSTATIN) 100,000 unit/mL suspension Take 5 mLs (500,000 Units total) by mouth 4 (four) times daily. for 10 days 473 mL 0     vitamin D (VITAMIN D3) 1000 units Tab Take 1,000 Units by mouth once daily.   Taking    cetirizine (ZYRTEC) 10 MG tablet Take 1 tablet (10 mg total) by mouth once daily. for 10 days 10 tablet 0     escitalopram oxalate (LEXAPRO) 10 MG tablet Take 1 tablet (10 mg total) by mouth once daily. 30 tablet 2 Taking    famotidine (PEPCID) 40 MG tablet Take 1 tablet (40 mg total) by mouth every evening. for 10 days 10 tablet 0     fluticasone (FLONASE) 50 mcg/actuation nasal spray SHAKE LIQUID AND USE 1 SPRAY(50 MCG) IN EACH NOSTRIL EVERY DAY 16 mL 5 Taking    predniSONE (DELTASONE) 20 MG tablet Take 3 tablets daily for 2 days, 2 tablets daily for 2 days then 1 tablet daily 14 tablet 0        Physical Exam:    Vital Signs:   Vitals:    02/07/19 1432   BP: 132/78   Pulse: 100   Resp: 16   Temp: 98.8 °F (37.1 °C)       General Appearance: Well appearing; nontoxic  Eyes:    No scleral icterus  ENT: Neck supple, Lips, mucosa, and tongue normal  Lungs: nonlabored respirations.  Heart:  Regular rate, distal pulse intact  Abdomen: Soft, non tender, non distended . No hepatosplenomegaly, ascites, or mass.  Extremities: No edema  Neuro: alert and oriented x 3  Labs:  Lab Results   Component Value Date    WBC 8.85 02/07/2019    HGB 7.5 (L) 02/07/2019    HCT 24.4 (L) 02/07/2019     02/07/2019    CHOL 186 01/23/2019    TRIG 68 01/23/2019    HDL 49 01/23/2019    ALT 35 02/07/2019    AST 87 (H) 02/07/2019     (L) 02/07/2019    K 4.4 02/07/2019     02/07/2019    CREATININE 0.6 02/07/2019    BUN 27 (H)  02/07/2019    CO2 28 02/07/2019    TSH 3.586 01/22/2019    INR 1.1 01/22/2019    HGBA1C 6.3 (H) 01/22/2019       I have explained the risks and benefits of endoscopy procedures to the patient and  including but not limited to bleeding, perforation, infection, and death.      Clinton Yost MD

## 2019-02-07 NOTE — TRANSFER OF CARE
"Anesthesia Transfer of Care Note    Patient: Ermelinda Verde    Procedure(s) Performed: Procedure(s) (LRB):  INSERTION, PEG TUBE (N/A)    Patient location: [facility]    Anesthesia Type: general    Transport from OR: Transported from OR on room air with adequate spontaneous ventilation    Post pain: adequate analgesia    Post assessment: no apparent anesthetic complications and tolerated procedure well    Post vital signs: stable    Level of consciousness: awake, alert and oriented    Nausea/Vomiting: no nausea/vomiting    Complications: none    Transfer of care protocol was followed      Last vitals:   Visit Vitals  /78 (BP Location: Left arm, Patient Position: Lying)   Pulse 100   Temp 37.1 °C (98.8 °F) (Temporal)   Resp 16   Ht 5' 5" (1.651 m)   Wt 73.4 kg (161 lb 13.1 oz)   LMP  (LMP Unknown)   SpO2 100%   Breastfeeding? No   BMI 26.93 kg/m²     "

## 2019-02-07 NOTE — PT/OT/SLP PROGRESS
"Physical Therapy Treatment    Patient Name:  Ermelinda Verde   MRN:  [id number]    Recommendations:     Discharge Recommendations:  rehabilitation facility   Discharge Equipment Recommendations: walker, rolling   Barriers to discharge: Decreased caregiver support    Assessment:     Ermelinda Verde is a 58 y.o. female admitted with a medical diagnosis of Myositis.  She presents with the following impairments/functional limitations:  weakness, impaired endurance, gait instability, impaired balance, impaired cardiopulmonary response to activity, decreased upper extremity function, decreased lower extremity function, impaired self care skills.  Pt is going to surgery for a PEG placement, sec to continuous NPO status.  Pt presents as significantly weaker than previous treatment sessions.  Especially in the UEs.  Encouraging pt to remain compliant with HEP, though adapted all therex to be performed in sit sec to concerns for pt safety.      Rehab Prognosis: Fair; patient would benefit from acute skilled PT services to address these deficits and reach maximum level of function.    Recent Surgery: Procedure(s) (LRB):  INSERTION, PEG TUBE (N/A) Day of Surgery    Plan:     During this hospitalization, patient to be seen 3 x/week to address the identified rehab impairments via gait training, therapeutic activities, therapeutic exercises, neuromuscular re-education and progress toward the following goals:    · Plan of Care Expires:  02/22/19    Subjective     Chief Complaint: UE weakness  Patient/Family Comments/goals: To improve mm strength  Pain/Comfort:  · Pain Rating 1: 0/10      Objective:     Communicated with nursing prior to session.  Patient found all lines intact, call button in reach and spouse present (port)  upon PT entry to room.     "I am so weak, they just put me back on fluids."    General Precautions: Standard, aspiration, fall, NPO   Orthopedic Precautions:N/A   Braces: N/A     Functional " Mobility:  · Transfers:     · Sit to Stand:  independence with no AD  · Bed to Chair: stand by assistance with  rolling walker  using  Stand Pivot  · Toilet Transfer: stand by assistance with  rolling walker  using  Stand Pivot  · Gait: Pt amb 14' x 2, with toileting bn trials, SBA, RW, with no episodes of LOB, with obstacle negotiations  · Balance: SBA: dynamic standing balance with AD      AM-PAC 6 CLICK MOBILITY  Turning over in bed (including adjusting bedclothes, sheets and blankets)?: 4  Sitting down on and standing up from a chair with arms (e.g., wheelchair, bedside commode, etc.): 4  Moving from lying on back to sitting on the side of the bed?: 4  Moving to and from a bed to a chair (including a wheelchair)?: 4  Need to walk in hospital room?: 3  Climbing 3-5 steps with a railing?: 3  Basic Mobility Total Score: 22       Therapeutic Activities and Exercises:   Whiteboard updated  Ankle pumps: 20 reps, in sit   LAQs: 20 reps each LE perf individually, in sit  Hip abd/add: 20 reps each LE perf individually, in sit  Hip flex: 20 reps each LE perf individually, in sit  Sit-ups: 20 reps, in reclined sitting posture  Sit<>stand: 2 min 49.36 sec, 6/10 RPE following  Chair push ups: 6 reps  Wall push-ups: 5 reps  Wall walkin reps, perf B in stance    Patient left up in chair with all lines intact and call button in reach.    GOALS:   Multidisciplinary Problems     Physical Therapy Goals        Problem: Physical Therapy Goal    Goal Priority Disciplines Outcome Goal Variances Interventions   Physical Therapy Goal     PT, PT/OT Ongoing (interventions implemented as appropriate)     Description:  Goals to be met by: 19     Patient will increase functional independence with mobility by performin. Gait  x 500 feet with Supervision without device. - GOAL MET  2. Standing lower extremity exercise program x 15 reps per handout, with supervision. - GOAL MET    3. Increased functional strength to 5/5 for  BLE.  4. Pt to perf 6MWT: amb 580', to demonstrate a clinically significant improvement in aerobic capacity.    5. Pt to perf 10x sit<>stand: 2 min 45 sec, to demonstrate improvements in B LE mm strength.                          Time Tracking:     PT Received On: 02/07/19  PT Start Time: 1311     PT Stop Time: 1334  PT Total Time (min): 23 min     Billable Minutes: Therapeutic Exercise 23    Treatment Type: Treatment  PT/PTA: PT           Lotus Camacho, PT  02/07/2019

## 2019-02-07 NOTE — TREATMENT PLAN
Treatment Plan  02/07/2019  12:45 PM    Patient seen and discussed with attending.  Plan for PEG tomorrow.  Full consult to follow.    Alisia Blas M.D.  Gastroenterology Fellow, PGY-V  Pager: [phone]  [facility]

## 2019-02-07 NOTE — NURSING TRANSFER
Nursing Transfer Note      2/7/2019     Transfer To: 808 From [facility] 28    Transfer via stretcher    Transfer with  at bedside and IV pump    Transported by escort    Medicines sent: none    Chart send with patient: Yes    Notified: Alea Berry    Patient reassessed at: 5251 2/7/19  Upon arrival to floor: cardiac monitor applied, patient oriented to room, call bell in reach and bed in lowest position    VSS. Mild complaints of pain reported. PRN medications given. therapeutic relief obtained. Warm blankets given.

## 2019-02-07 NOTE — TELEPHONE ENCOUNTER
Spoke to Kaushik to answer questions about pt. Informed Kaushik that pt is currently admitted to hospital and has been so since around 1/22/19 so everything pertaining to chemo therapy/clinic visits are currently on hold. Advised that if any other information was needed to please give us a call back        ----- Message from Hilario Vuong sent at 2/7/2019  8:14 AM CST -----  Contact: Heidi ([employer])   Received office notes on the pt, and it shows that pt will be stopping chemo. So they want to know if she's able to return to work, and if so does she have any physical restrictions.    Contact:: [phone]

## 2019-02-07 NOTE — ASSESSMENT & PLAN NOTE
Patient is experiencing difficulty swallowing that has been increasing in severity over last couple of days to the point where patient did not feel as if she could swallow.  As of 01/26, SLP evaluated patient and determined that she should be NPO except for medications until a modified barium swallow could be performed. Possibly candidal esophagitis; patient has oral thrush.    Plan:  - High aspiration risk on modified barium study, strict NPO  - NGT in situ, tube feeds initiated per dietary consult  - Switched to IV meds where possible  - Fluconazole IVPB 200mg q24hr (now d/c'd)  - GI consulted, EGD resulted w/ Grade C erosive esophagitis, no candidal infxn identified. Bx obtained, GI recs start PPI bid  - Failed swallow study after NGT removed (2/6/2019) now awaiting PEG placement (scheduled for 2/8)  - D5 normal saline while awaiting PEG tomorrow  - Dietary/nutrition consult for tube feeds and education  - Medications per PEG tube  - Home health SLP therapy to work on swallowing improvement

## 2019-02-07 NOTE — PLAN OF CARE
Problem: Physical Therapy Goal  Goal: Physical Therapy Goal  Goals to be met by: 19     Patient will increase functional independence with mobility by performin. Gait  x 500 feet with Supervision without device. - GOAL MET  2. Standing lower extremity exercise program x 15 reps per handout, with supervision. - GOAL MET    3. Increased functional strength to 5/5 for BLE.  4. Pt to perf 6MWT: amb 580', to demonstrate a clinically significant improvement in aerobic capacity.    5. Pt to perf 10x sit<>stand: 2 min 45 sec, to demonstrate improvements in B LE mm strength.       Outcome: Ongoing (interventions implemented as appropriate)  Pt achieved 2 goals, new goal added.

## 2019-02-07 NOTE — PT/OT/SLP PROGRESS
Speech Language Pathology      Ermelinda Verde   [facility] ROOM 2ND *    MRN: [id number]    Patient not seen today secondary to Unavailable (Comment)(Upon SLP attempt to treat at 1439, pt PIETRO for PEG tube placement. ). If unable to follow up later this service date, will follow up according to SLP POC if pt medically stable and available.     IRENA Khan, CCC-SLP  [phone]  2/7/2019

## 2019-02-07 NOTE — PROGRESS NOTES
[facility]  Hematology/Oncology  Progress Note    Patient Name: Ermelinda Verde  Admission Date: 1/22/2019  Hospital Length of Stay: 16 days  Code Status: Full Code     Subjective:     HPI:  Ms Verde is a 59 yo F with a prior diagnosis of L sided breast cancer, s/p 3 cycles of nab-paclitaxel and atezolizumab who presents from clinic with a roughly week long, multi-day history of proximal muscle weakness in the shoulder girdle muscles, bilateral and associated with mild tenderness. She reports generalized weakness, but strength impairment with the use of her upper extremities more than lower extremities, and her ADLs are intact. Her last PET scan in December 2018 showed almost complete resolution of her adenopathy, and she had been complaining of a rash, which had been attributed to Nab paclitaxel. Last week, 1/15, she had a CT neck/ since there was concern for facial swelling per symptoms, and the CT showed lymphadenopathy without airway or vascular compromise. 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 Most recently, the patient had been on 60mg of prednisone with a taper and had noted swelling, proximal weakness. She also notes some progressive swallowing difficulty, but attributes this to candidal thrush for which she takes nystatin scheduled. She reports poor PO intake preceding admission, dark, mario colored urine, but denies fevers or joint pain.      Onc History per Dr. Reyna:   She developed a palpable abnormality in her left breast in January 2017 which she noted on self-examination.  A diagnostic mammogram on January 19 showed a greater than 1 cm nodule in the upper outer portion of left breast.  By ultrasound this was lobulated and hypoechoic measuring 1.75 x 1.51 x 1.96 cm.     On January 24,  2017 a core needle biopsy was performed which showed infiltrating ductal carcinoma, high grade.  The tumor was ER negative, NC negative, and HER-2 negative.  A follow-up ultrasound on December 6 showed 2.5 x 2.2 x 1.5 cm left breast mass.  There was no abnormality noted in the left axilla.     She underwent sentinel lymph node biopsy on February 22.  That showed 4 negative lymph nodes.     She had 4 cycles of  Wilbur-adjuvantTaxotere and Cytoxan completed  on 5/9/17.     On June 26 she underwent left mastectomy.  That revealed 2 foci of invasive high-grade carcinoma measuring 14 mm and 1.5 mm.  Margins were negative.     She completed 4 cycles of adjuvant Adriamycin on September 12, 2017.     In October, she developed some left supraclavicular lymphadenopathy which turned out to be recurrence.     A fine-needle aspirate of the lymph node was performed on October 19th.  That showed metastatic carcinoma consistent with breast primary which was ER negative, NC negative and HER 2-negative.           Interval History:   NAEO, failed swallow study. Awaiting PEG tube placement tomorrow, nutrition consultation for education and nutrition advice. MTX 20mg given 2/6/2019    Oncology Treatment Plan:   OP BREAST DOCETAXEL Q3W    Medications:  Continuous Infusions:  Scheduled Meds:   amLODIPine  10 mg Per NG tube Daily    calcium-vitamin D3  1 tablet Oral BID    enoxaparin  40 mg Subcutaneous Daily    escitalopram oxalate  10 mg Per NG tube Daily    famotidine  20 mg Per NG tube BID    folic acid  1 mg Oral Daily    guaifenesin 100 mg/5 ml  200 mg Per NG tube Q4H    methylPREDNISolone sodium succinate  24 mg Intravenous Q12H     PRN Meds:acetaminophen, ALPRAZolam, dextrose 50%, dextrose 50%, glucagon (human recombinant), glucose, glucose, hepatitis B, hydrALAZINE, insulin aspart U-100, morphine, ondansetron, pneumoc 13-brandin conj-dip cr(PF), polyethylene glycol, promethazine, sodium chloride, sodium chloride 0.9%, sodium  chloride 0.9%     Review of Systems   Constitutional: Positive for fatigue. Negative for activity change, appetite change, chills, diaphoresis, fever and unexpected weight change.   HENT: Positive for facial swelling and trouble swallowing. Negative for congestion, ear discharge, hearing loss, postnasal drip, sinus pressure, sneezing, sore throat and tinnitus.    Eyes: Negative for photophobia, pain and redness.   Respiratory: Negative for apnea, cough, choking, chest tightness, shortness of breath and stridor.    Cardiovascular: Negative for chest pain, palpitations and leg swelling.   Gastrointestinal: Negative for abdominal pain, anal bleeding, constipation, diarrhea, nausea and rectal pain.   Endocrine: Negative for polyuria.   Genitourinary: Negative for dysuria and hematuria.   Musculoskeletal: Negative for arthralgias, back pain, gait problem, joint swelling, myalgias, neck pain and neck stiffness.   Skin: Positive for rash. Negative for color change and pallor.   Allergic/Immunologic: Negative for immunocompromised state.   Neurological: Positive for weakness. Negative for dizziness, seizures and numbness.   Hematological: Positive for adenopathy. Does not bruise/bleed easily.   Psychiatric/Behavioral: Negative for agitation and behavioral problems.     Objective:     Vital Signs (Most Recent):  Temp: 97.7 °F (36.5 °C) (02/07/19 0828)  Pulse: 108 (02/07/19 0828)  Resp: 18 (02/07/19 0828)  BP: 120/69 (02/07/19 0828)  SpO2: 99 % (02/07/19 0828) Vital Signs (24h Range):  Temp:  [97.7 °F (36.5 °C)-98.5 °F (36.9 °C)] 97.7 °F (36.5 °C)  Pulse:  [] 108  Resp:  [17-19] 18  SpO2:  [95 %-99 %] 99 %  BP: (111-149)/(63-71) 120/69     Weight: 73.4 kg (161 lb 13.1 oz)  Body mass index is 26.93 kg/m².  Body surface area is 1.83 meters squared.      Intake/Output Summary (Last 24 hours) at 2/7/2019 1046  Last data filed at 2/7/2019 0600  Gross per 24 hour   Intake --   Output 1250 ml   Net -1250 ml       Physical Exam    Constitutional: She is oriented to person, place, and time. She appears well-developed and well-nourished. No distress.   HENT:   Head: Atraumatic.   Nose: Nose normal.   Mouth/Throat: No oropharyngeal exudate.   Eyes: Conjunctivae and EOM are normal. Pupils are equal, round, and reactive to light. Right eye exhibits no discharge. Left eye exhibits no discharge. No scleral icterus.   Neck: Normal range of motion. Neck supple. No tracheal deviation present.   Cardiovascular: Normal rate, regular rhythm and intact distal pulses. Exam reveals no gallop and no friction rub.   Pulmonary/Chest: Effort normal and breath sounds normal. No respiratory distress. She has no wheezes. She has no rales. She exhibits no tenderness.   Abdominal: Soft. Bowel sounds are normal. She exhibits no distension and no mass. There is no tenderness. There is no rebound and no guarding.   Musculoskeletal: Normal range of motion. She exhibits no tenderness or deformity.   Neurological: She is alert and oriented to person, place, and time. No cranial nerve deficit or sensory deficit.     4.5/5 shoulder strength on shoulder abduction; improving  5/5 flexion and extension preserved at elbow   Skin: Skin is warm and dry. Capillary refill takes less than 2 seconds. No rash noted. She is not diaphoretic. No erythema. No pallor.   Psychiatric: She has a normal mood and affect.   Vitals reviewed.      Significant Labs:   BMP:   Recent Labs   Lab 02/06/19  0409 02/07/19  0350   * 96   * 134*   K 4.2 4.4   CL 99 100   CO2 28 28   BUN 25* 27*   CREATININE 0.6 0.6   CALCIUM 8.8 9.0   MG 1.9 2.1   , CBC:   Recent Labs   Lab 02/06/19  0409 02/07/19  0350   WBC 11.62 8.85   HGB 7.7* 7.5*   HCT 24.8* 24.4*    179   , CMP:   Recent Labs   Lab 02/06/19  0409 02/07/19  0350   * 134*   K 4.2 4.4   CL 99 100   CO2 28 28   * 96   BUN 25* 27*   CREATININE 0.6 0.6   CALCIUM 8.8 9.0   PROT 6.7 6.8   ALBUMIN 2.6* 2.8*   BILITOT 0.3 0.5    ALKPHOS 79 66   AST 90* 87*   ALT 36 35   ANIONGAP 5* 6*   EGFRNONAA >60.0 >60.0   , Coagulation: No results for input(s): PT, INR, APTT in the last 48 hours., Haptoglobin:   No results for input(s): HAPTOGLOBIN in the last 48 hours., Immunology: No results for input(s): SPEP, ERVIN, DARCY, FREELAMBDALI in the last 48 hours., LDH: No results for input(s): LDHCSF, BFSOURCE in the last 48 hours., LFTs:   Recent Labs   Lab 02/06/19  0409 02/07/19  0350   ALT 36 35   AST 90* 87*   ALKPHOS 79 66   BILITOT 0.3 0.5   PROT 6.7 6.8   ALBUMIN 2.6* 2.8*   , Reticulocytes:   No results for input(s): RETIC in the last 48 hours., Tumor Markers: No results for input(s): PSA, CEA, , AFPTM, GI0567,  in the last 48 hours.    Invalid input(s): ALGTM, Uric Acid No results for input(s): URICACID in the last 48 hours., Urine Studies: No results for input(s): COLORU, APPEARANCEUA, PHUR, SPECGRAV, PROTEINUA, GLUCUA, KETONESU, BILIRUBINUA, OCCULTUA, NITRITE, UROBILINOGEN, LEUKOCYTESUR, RBCUA, WBCUA, BACTERIA, SQUAMEPITHEL, HYALINECASTS in the last 48 hours.    Invalid input(s): YANET,   Recent Lab Results       02/07/19  0350        Immature Granulocytes 0.7     Immature Grans (Abs) 0.06  Comment:  Mild elevation in immature granulocytes is non specific and   can be seen in a variety of conditions including stress response,   acute inflammation, trauma and pregnancy. Correlation with other   laboratory and clinical findings is essential.       Albumin 2.8     Alkaline Phosphatase 66     ALT 35     Anion Gap 6     AST 87     Baso # 0.00     Basophil% 0.0     Total Bilirubin 0.5  Comment:  For infants and newborns, interpretation of results should be based  on gestational age, weight and in agreement with clinical  observations.  Premature Infant recommended reference ranges:  Up to 24 hours.............<8.0 mg/dL  Up to 48 hours............<12.0 mg/dL  3-5 days..................<15.0 mg/dL  6-29 days.................<15.0 mg/dL        BUN, Bld 27     Calcium 9.0     Chloride 100     CO2 28          Creatinine 0.6     Differential Method Automated     eGFR if African American >60.0     eGFR if non  >60.0  Comment:  Calculation used to obtain the estimated glomerular filtration  rate (eGFR) is the CKD-EPI equation.        Eos # 0.0     Eosinophil% 0.0     Glucose 96     Gran # (ANC) 7.8     Gran% 87.5     Hematocrit 24.4     Hemoglobin 7.5     Lymph # 0.5     Lymph% 5.9     Magnesium 2.1     MCH 26.8     MCHC 30.7     MCV 87     Mono # 0.5     Mono% 5.9     MPV 10.8     nRBC 0     Phosphorus 4.1     Platelets 179     Potassium 4.4     Total Protein 6.8     RBC 2.80     RDW 19.6     Sodium 134     WBC 8.85        and All pertinent labs from the last 24 hours have been reviewed.    Diagnostic Results:  I have reviewed and interpreted all pertinent imaging results/findings within the past 24 hours.    Assessment/Plan:     * Myositis    - DDX dermatomyositis de haylie vs related to immunotherapy  - trend CPK daily; downtrending generally with a slight bump on 01/27  -restarted fluids 01/26 due to NPO  - myositis labs suspicious for myosits; rheum on board.   - F/u 1/24 skin biopsy. Pending results may need muscle biopsy by general surgery  -MRI left humerus suspicious for myositis    Per rheumatology  - Methylpred 125mg IV bid started, now tapered to 24mg IV bid, to transition to PO pred 48mg daily  - MTX 15mg subQ qWeekly , now 20mg qWeekly (last dose 2/6/2019)  - IVIG x5 days completed 2/3/19 will require 1x maintenance dose 4 weeks from 2/3 per rheum  - myomarker panel, HMGCR, QG-TB pending  - PFTs as OP  - Quantitative IgA 533, IgG 932, IgM 66  - ID consulted for fast track vacc  - f/u with Dr. Swift and Dr. Campos in Rheumatology at discharge  - Currently tapering steroids per rheumatology, transition to PO medrol 48mg daily once swallow study passed    Lab Results   Component Value Date     (H) 02/07/2019    CPK  448 (H) 02/06/2019     (H) 02/05/2019     (H) 02/04/2019     (H) 02/03/2019          Hypokalemia    Replete prn     Dysphagia    Patient is experiencing difficulty swallowing that has been increasing in severity over last couple of days to the point where patient did not feel as if she could swallow.  As of 01/26, SLP evaluated patient and determined that she should be NPO except for medications until a modified barium swallow could be performed. Possibly candidal esophagitis; patient has oral thrush.    Plan:  - High aspiration risk on modified barium study, strict NPO  - NGT in situ, tube feeds initiated per dietary consult  - Switched to IV meds where possible  - Fluconazole IVPB 200mg q24hr (now d/c'd)  - GI consulted, EGD resulted w/ Grade C erosive esophagitis, no candidal infxn identified. Bx obtained, GI recs start PPI bid  - Failed swallow study after NGT removed (2/6/2019) now awaiting PEG placement (scheduled for 2/8)  - D5 normal saline while awaiting PEG tomorrow  - Dietary/nutrition consult for tube feeds and education  - Medications per PEG tube  - Home health SLP therapy to work on swallowing improvement     Swelling of upper arm    -B/L U/S negative for DVTs  -appears less edematous than day prior  -continue to monitor     Dermatitis    - Skin biopsy from 1/24 pending, appreciate dermatology recs     Candidiasis    Oral thrush  -cont Nystatin swish and swallow. Additionally, added fluconaozle 200 mg Po qday on 01/26 as possibility patient is experiencing esophagitis 2/2 to candida  -appears resolved now s/p fluconazole and nystatin     Rhabdomyolysis    -see myositis. Continue supportive care, Cr/ electrolyte/ CPK monitoring and aggressive hydration. transaminitis on CMP is likely due to skeletal muscle rhabdo, not hepatic     Drug rash    - Unclear if symptoms related to chemotherapy     Pre-diabetes    -check a1c, 0-5u SSI while on steroids, add prandial insulin as warranted.       Adjustment disorder with depressed mood    -cont home SSRI     Vitamin B12 deficiency    -cont home B12 supplementation      Breast cancer of upper-outer quadrant of left female breast    -On January 24, 2017 a core needle biopsy was performed which showed infiltrating ductal carcinoma, high grade.  The tumor was ER negative, GA negative, and HER-2 negative.  -She had 4 cycles of  Wilbur-adjuvantTaxotere and Cytoxan completed  on 5/9/17.  -On June 26 she underwent left mastectomy.  That revealed 2 foci of invasive high-grade carcinoma measuring 14 mm and 1.5 mm.  Margins were negative.  -She completed 4 cycles of adjuvant Adriamycin on September 12, 2017.  -  A fine-needle aspirate of the lymph node was performed on October 19th.  That showed metastatic carcinoma consistent with breast primary which was ER negative, GA negative and HER 2-negative.    -Per rheumatology: hold paclitaxel agent and atezolizumb at this time - both can cause myositis      Hypertension    -on home amlodipine-benazepril; will hold ACEi for now and observe renal function with CPK elevation in rhabdo.     Plan:  - Amlodipine 10 mg qday on admission  - 1/28 Pt now strict NPO due to failed swallow study, now on hydralazine 10mg IV q8h WCTM and adjust PRN            Noah Patel MD  Hematology/Oncology  [facility]      I have reviewed the notes, assessments, and/or procedures performed by the housestaff, as above.  I have personally interviewed and examined the patient at the beside, and rounded with the housestaff. I concur with her/his assessment and plan and the documentation of Ermelinda Verde.  I, Dr. Conrad Cheung, personally spent more than  35 mins during this encounter, greater than 50% was spent in direct counseling and/or coordination of care.     Cnorad Cheung M.D., M.S., F.A.C.P.  Hematology/Oncology Attending  [facility]

## 2019-02-07 NOTE — ASSESSMENT & PLAN NOTE
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   - F/u 1/24 skin biopsy. Pending results may need muscle biopsy by general surgery  -MRI left humerus suspicious for myositis    Per rheumatology  - Methylpred 125mg IV bid started, now tapered to 24mg IV bid, to transition to PO pred 48mg daily  - MTX 15mg subQ qWeekly , now 20mg qWeekly (last dose 2/6/2019)  - IVIG x5 days completed 2/3/19 will require 1x maintenance dose 4 weeks from 2/3 per rheum  - myomarker panel, HMGCR, QG-TB pending  - PFTs as OP  - Quantitative IgA 533, IgG 932, IgM 66  - ID consulted for fast track vacc  - f/u with Dr. Swift and Dr. Cmapos in Rheumatology at discharge  - Currently tapering steroids per rheumatology, transition to PO medrol 48mg daily once swallow study passed    Lab Results   Component Value Date     (H) 02/07/2019     (H) 02/06/2019     (H) 02/05/2019     (H) 02/04/2019     (H) 02/03/2019

## 2019-02-07 NOTE — PROGRESS NOTES
[facility]  Rheumatology  Progress Note    Patient Name: Ermelinda Verde  MRN: [id number]  Admission Date: 1/22/2019  Hospital Length of Stay: 16 days  Code Status: Full Code   Attending Provider: Conrad Cheung MD  Primary Care Physician: Reta Weeks MD  Principal Problem: Myositis    Subjective:     HPI: 57yo F with history of L sided infiltrating ductal carcinoma of the L breast (dx on Jan 2017), HTN, depression, and gastritis was send from hem/onc clinic for evaluation of possible inflammatory myositis.     L breast cancer s/p completion of 4 cycles of demi-adjuvant taxotere and cytoxan (completed on 5/9/17) and mastectomy (6/26/17) and then 4 cycles of adjuvant Adriamycin (completed 9/12/17). In 10/2017 - patient developed L supraclavicular lymphadenopathy which FNA confirmed as reoccurrence of previously treated breast cancer.     Patient started on Atelizumab/Abraxane on 11/7/18. Per chart review, patient noticed rashes on the dorsum of her hands on 11/11/18. Seen in urgent care and given topical steroid cream. Then received two more infusions on Atelizumab/Abraxane on 11/21/18 and 12/5/18. Started to notice puffiness around the eyes on 12/11/18. Received another Abraxane infusion on 12/12/18 but this time with hydrocortisone 50 mg which helped reduce the swelling around the eyes. Developed swelling of the face again on 12/15/18. Next infusion of Abraxane done on 12/19/18 with Solucortef and Atelizumab held. Abraxane given again on 1/3/19 with no IV steroid. Patient developed swelling of the face on 1/4/19 and went to urgent care and given short course of prednisone 20 mg BID. On 1/15/19, patient seen in hem/onc clinic with c/o of pressure and tightness around neck. CT scan of chest and neck did not reveal any vascular compression. Started on prednisone 60 mg with taper. On 1/22/18 patient with c/o proximal muscle weakness. CPK found to be elevated around 4k. Patient admitted and given  solumedrol 80 mg IV on 1/22/18. Last infusion of Atelizumab on 12/19/18. Last infusion of Abraxane on 1/3/19. Given Solumedrol 1g x 1 on 1/23/18. Seen by Dermatology on 1/24/18 and biopsy done of skin rash. Given distribution of rashes, there was concern for dermatomyositis. Patient started on Solumedrol 125 mg IV BID at this time.     Denies any family history of autoimmune diseases.    No smoking, EtOH, recreational drug usage.    Denies any photosensitivity, joint swelling, unintentional weigh loss, abdominal pain, night sweats, CP, SOB.  +oral thrush.     Interval History: Patient seen at bedside. Unable to pass swallow eval and will be getting PEG tube. Get second MTX weekly injection last night; tolerated well. Strength improving. No new rashes. No trouble breathing, f/c, n/v/d.     Current Facility-Administered Medications   Medication Frequency    acetaminophen tablet 650 mg Q4H PRN    ALPRAZolam tablet 0.25 mg TID PRN    amLODIPine tablet 10 mg Daily    calcium-vitamin D3 500 mg(1,250mg) -200 unit per tablet 1 tablet BID    dextrose 50% injection 12.5 g PRN    dextrose 50% injection 25 g PRN    enoxaparin injection 40 mg Daily    escitalopram oxalate tablet 10 mg Daily    famotidine tablet 20 mg BID    folic acid tablet 1 mg Daily    glucagon (human recombinant) injection 1 mg PRN    glucose chewable tablet 16 g PRN    glucose chewable tablet 24 g PRN    guaifenesin 100 mg/5 ml syrup 200 mg Q4H    hepatitis B (HEPLISAV-B) 20 mcg/0.5 mL vaccine 0.5 mL vaccine x 1 dose    hydrALAZINE injection 10 mg Q6H PRN    insulin aspart U-100 pen 0-5 Units QID (AC + HS) PRN    methylPREDNISolone sodium succinate injection 24 mg Q12H    ondansetron disintegrating tablet 8 mg Q8H PRN    oxyCODONE immediate release tablet 5 mg Q6H PRN    pneumoc 13-brandin conj-dip cr(PF) (PREVNAR 13 (PF)) 0.5 mL vaccine x 1 dose    polyethylene glycol packet 17 g BID PRN    promethazine tablet 12.5 mg Q6H PRN    sodium  chloride 0.65 % nasal spray 1 spray PRN    sodium chloride 0.9% flush 10 mL PRN    sodium chloride 0.9% flush 5 mL PRN     Objective:     Vital Signs (Most Recent):  Temp: 97.9 °F (36.6 °C) (02/06/19 1235)  Pulse: 98 (02/06/19 1235)  Resp: 18 (02/06/19 1235)  BP: 139/68 (02/06/19 1235)  SpO2: 97 % (02/06/19 1235)  O2 Device (Oxygen Therapy): room air (02/06/19 1040) Vital Signs (24h Range):  Temp:  [97.9 °F (36.6 °C)-99.5 °F (37.5 °C)] 97.9 °F (36.6 °C)  Pulse:  [] 98  Resp:  [17-20] 18  SpO2:  [96 %-98 %] 97 %  BP: (124-139)/(61-71) 139/68     Weight: 74.5 kg (164 lb 3.9 oz) (02/06/19 0358)  Body mass index is 27.33 kg/m².  Body surface area is 1.85 meters squared.      Intake/Output Summary (Last 24 hours) at 2/6/2019 1406  Last data filed at 2/6/2019 1400  Gross per 24 hour   Intake 450 ml   Output 1100 ml   Net -650 ml       Physical Exam   Constitutional: She is oriented to person, place, and time and well-developed, well-nourished, and in no distress. No distress.   HENT:   Head: Normocephalic and atraumatic.   Right Ear: External ear normal.   Left Ear: External ear normal.   Bilateral orbital edema with heliotropic rash - resolved   Eyes: Conjunctivae and EOM are normal. Pupils are equal, round, and reactive to light.   Neck: Normal range of motion. Neck supple.   Cardiovascular: Normal rate, regular rhythm, normal heart sounds and intact distal pulses.    Pulmonary/Chest: Effort normal and breath sounds normal. No respiratory distress.   Abdominal: Soft. Bowel sounds are normal.       Right Side Rheumatological Exam     Muscle Strength (0-5 scale):  Neck Flexion:  4.8  Neck Extension: 5  Deltoid:  4.4  Biceps: 5/5   Triceps:  4.4  : 5/5   Iliopsoas: 4.6  Quadriceps:  5   Distal Lower Extremity: 5    Left Side Rheumatological Exam     Muscle Strength (0-5 scale):  Neck Flexion:  4.8  Neck Extension: 5  Deltoid:  4.4  Biceps: 5/5   Triceps:  4.4  :  5/5   Iliopsoas: 4.6  Quadriceps:  5    Distal Lower Extremity: 5      Neurological: She is alert and oriented to person, place, and time. GCS score is 15.   Skin: Skin is warm and dry. No rash noted.     Hyperpigmented non raised rash over nape of neck, elbows, knuckles (resemble Gottron's papules), thighs, and ankles -resolving       Psychiatric: Mood, memory, affect and judgment normal.   Musculoskeletal: Normal range of motion. She exhibits edema. She exhibits no tenderness or deformity.         Significant Labs:  Recent Results (from the past 48 hour(s))   Comprehensive Metabolic Panel (CMP)    Collection Time: 02/05/19  4:55 AM   Result Value Ref Range    Sodium 131 (L) 136 - 145 mmol/L    Potassium 4.2 3.5 - 5.1 mmol/L    Chloride 97 95 - 110 mmol/L    CO2 29 23 - 29 mmol/L    Glucose 115 (H) 70 - 110 mg/dL    BUN, Bld 24 (H) 6 - 20 mg/dL    Creatinine 0.5 0.5 - 1.4 mg/dL    Calcium 8.5 (L) 8.7 - 10.5 mg/dL    Total Protein 6.6 6.0 - 8.4 g/dL    Albumin 2.5 (L) 3.5 - 5.2 g/dL    Total Bilirubin 0.3 0.1 - 1.0 mg/dL    Alkaline Phosphatase 78 55 - 135 U/L    AST 87 (H) 10 - 40 U/L    ALT 33 10 - 44 U/L    Anion Gap 5 (L) 8 - 16 mmol/L    eGFR if African American >60.0 >60 mL/min/1.73 m^2    eGFR if non African American >60.0 >60 mL/min/1.73 m^2   Magnesium    Collection Time: 02/05/19  4:55 AM   Result Value Ref Range    Magnesium 1.9 1.6 - 2.6 mg/dL   Phosphorus    Collection Time: 02/05/19  4:55 AM   Result Value Ref Range    Phosphorus 2.9 2.7 - 4.5 mg/dL   CK    Collection Time: 02/05/19  4:55 AM   Result Value Ref Range     (H) 20 - 180 U/L   CBC auto differential    Collection Time: 02/05/19  4:55 AM   Result Value Ref Range    WBC 10.35 3.90 - 12.70 K/uL    RBC 2.90 (L) 4.00 - 5.40 M/uL    Hemoglobin 7.9 (L) 12.0 - 16.0 g/dL    Hematocrit 25.1 (L) 37.0 - 48.5 %    MCV 87 82 - 98 fL    MCH 27.2 27.0 - 31.0 pg    MCHC 31.5 (L) 32.0 - 36.0 g/dL    RDW 19.3 (H) 11.5 - 14.5 %    Platelets 152 150 - 350 K/uL    MPV 11.1 9.2 - 12.9 fL     Immature Granulocytes 0.8 (H) 0.0 - 0.5 %    Gran # (ANC) 8.7 (H) 1.8 - 7.7 K/uL    Immature Grans (Abs) 0.08 (H) 0.00 - 0.04 K/uL    Lymph # 0.8 (L) 1.0 - 4.8 K/uL    Mono # 0.8 0.3 - 1.0 K/uL    Eos # 0.0 0.0 - 0.5 K/uL    Baso # 0.00 0.00 - 0.20 K/uL    nRBC 0 0 /100 WBC    Gran% 83.8 (H) 38.0 - 73.0 %    Lymph% 7.7 (L) 18.0 - 48.0 %    Mono% 7.7 4.0 - 15.0 %    Eosinophil% 0.0 0.0 - 8.0 %    Basophil% 0.0 0.0 - 1.9 %    Differential Method Automated    Aldolase    Collection Time: 02/05/19  4:55 AM   Result Value Ref Range    Aldolase 3.1 1.2 - 7.6 U/L   Comprehensive Metabolic Panel (CMP)    Collection Time: 02/06/19  4:09 AM   Result Value Ref Range    Sodium 132 (L) 136 - 145 mmol/L    Potassium 4.2 3.5 - 5.1 mmol/L    Chloride 99 95 - 110 mmol/L    CO2 28 23 - 29 mmol/L    Glucose 140 (H) 70 - 110 mg/dL    BUN, Bld 25 (H) 6 - 20 mg/dL    Creatinine 0.6 0.5 - 1.4 mg/dL    Calcium 8.8 8.7 - 10.5 mg/dL    Total Protein 6.7 6.0 - 8.4 g/dL    Albumin 2.6 (L) 3.5 - 5.2 g/dL    Total Bilirubin 0.3 0.1 - 1.0 mg/dL    Alkaline Phosphatase 79 55 - 135 U/L    AST 90 (H) 10 - 40 U/L    ALT 36 10 - 44 U/L    Anion Gap 5 (L) 8 - 16 mmol/L    eGFR if African American >60.0 >60 mL/min/1.73 m^2    eGFR if non African American >60.0 >60 mL/min/1.73 m^2   Magnesium    Collection Time: 02/06/19  4:09 AM   Result Value Ref Range    Magnesium 1.9 1.6 - 2.6 mg/dL   Phosphorus    Collection Time: 02/06/19  4:09 AM   Result Value Ref Range    Phosphorus 2.9 2.7 - 4.5 mg/dL   CK    Collection Time: 02/06/19  4:09 AM   Result Value Ref Range     (H) 20 - 180 U/L   CBC auto differential    Collection Time: 02/06/19  4:09 AM   Result Value Ref Range    WBC 11.62 3.90 - 12.70 K/uL    RBC 2.88 (L) 4.00 - 5.40 M/uL    Hemoglobin 7.7 (L) 12.0 - 16.0 g/dL    Hematocrit 24.8 (L) 37.0 - 48.5 %    MCV 86 82 - 98 fL    MCH 26.7 (L) 27.0 - 31.0 pg    MCHC 31.0 (L) 32.0 - 36.0 g/dL    RDW 19.3 (H) 11.5 - 14.5 %    Platelets 160 150 - 350  K/uL    MPV 11.0 9.2 - 12.9 fL    Immature Granulocytes 0.7 (H) 0.0 - 0.5 %    Gran # (ANC) 10.2 (H) 1.8 - 7.7 K/uL    Immature Grans (Abs) 0.08 (H) 0.00 - 0.04 K/uL    Lymph # 0.7 (L) 1.0 - 4.8 K/uL    Mono # 0.7 0.3 - 1.0 K/uL    Eos # 0.0 0.0 - 0.5 K/uL    Baso # 0.01 0.00 - 0.20 K/uL    nRBC 0 0 /100 WBC    Gran% 87.7 (H) 38.0 - 73.0 %    Lymph% 5.6 (L) 18.0 - 48.0 %    Mono% 5.9 4.0 - 15.0 %    Eosinophil% 0.0 0.0 - 8.0 %    Basophil% 0.1 0.0 - 1.9 %    Differential Method Automated    Aldolase    Collection Time: 02/06/19  4:09 AM   Result Value Ref Range    Aldolase 3.5 1.2 - 7.6 U/L   Results for ROMEO PEREZ (MRN [id number]) as of 2/7/2019 11:28   Ref. Range 1/31/2019 04:00 2/1/2019 04:12 2/2/2019 04:44 2/3/2019 03:29 2/4/2019 04:06 2/5/2019 04:55 2/6/2019 04:09 2/7/2019 03:50   CPK Latest Ref Range: 20 - 180 U/L 1147 (H) 827 (H) 662 (H) 517 (H) 465 (H) 434 (H) 448 (H) 357 (H)       Results for ROMEO PEREZ (MRN [id number]) as of 1/28/2019 10:23   Ref. Range 1/24/2019 03:40 1/25/2019 04:20 1/26/2019 04:00 1/27/2019 04:21 1/28/2019 04:00   Aldolase Latest Ref Range: 1.2 - 7.6 U/L 12.7 (H) 14.2 (H) 13.7 (H) 13.1 (H) 15.4 (H)   Results for ROMEO PEREZ (MRN [id number]) as of 1/28/2019 10:23   Ref. Range 1/22/2019 22:24   Sed Rate Latest Ref Range: 0 - 36 mm/Hr 50 (H)     Results for ROMEO PEREZ (MRN [id number]) as of 1/28/2019 10:23   Ref. Range 1/22/2019 22:24   CRP Latest Ref Range: 0.0 - 8.2 mg/L 31.1 (H)      Quant TB 1/30/19: indeterminate    Results for ROMEO PEREZ (MRN [id number]) as of 1/28/2019 10:23   Ref. Range 1/22/2019 22:24 1/25/2019 17:33   DARCY HEP-2 Titer Unknown Positive 1:640 Sp...    Anti-SSA Antibody Latest Ref Range: 0.00 - 19.99 EU 0.49    Anti-SSA Interpretation Latest Ref Range: Negative  Negative    Anti-SSB Antibody Latest Ref Range: 0.00 - 19.99 EU 0.11    Anti-SSB Interpretation Latest Ref Range: Negative  Negative    ds DNA Ab Latest Ref Range: Negative 1:10   Negative 1:10    Anti Sm Antibody Latest Ref Range: 0.00 - 19.99 EU 0.58    Anti-Sm Interpretation Latest Ref Range: Negative  Negative    Anti Sm/RNP Antibody Latest Ref Range: 0.00 - 19.99 EU 0.62    Anti-Sm/RNP Interpretation Latest Ref Range: Negative  Negative    DARCY Screen Latest Ref Range: Negative <1:160  Positive (A)    Complement (C-3) Latest Ref Range: 50 - 180 mg/dL  106   Complement (C-4) Latest Ref Range: 11 - 44 mg/dL  29   Meg-1 Autoantibodies Latest Ref Range: <1.0 Index <1.00    Results for ROMEO PEREZ (MRN [id number]) as of 1/28/2019 10:23   Ref. Range 1/23/2019 02:55 1/23/2019 02:56   Specimen UA Unknown Urine, Clean Catch    Color, UA Latest Ref Range: Yellow, Straw, [place]  Colorless (A)    pH, UA Latest Ref Range: 5.0 - 8.0  6.0    Specific Gravity, UA Latest Ref Range: 1.005 - 1.030  1.005    Appearance, UA Latest Ref Range: Clear  Clear    Protein, UA Latest Ref Range: Negative  Negative    Glucose, UA Latest Ref Range: Negative  Negative    Ketones, UA Latest Ref Range: Negative  Negative    Occult Blood UA Latest Ref Range: Negative  Negative    Nitrite, UA Latest Ref Range: Negative  Negative    Bilirubin (UA) Latest Ref Range: Negative  Negative    Leukocytes, UA Latest Ref Range: Negative  Trace (A)    RBC, UA Latest Ref Range: 0 - 4 /hpf  2   WBC, UA Latest Ref Range: 0 - 5 /hpf  3   Squam Epithel, UA Latest Units: /hpf  0   Microscopic Comment Unknown  SEE COMMENT     Meg-1: Negative    Significant Imaging:  MRI Humerus w/ and w/o contrast:  Impression       1. Diffuse edema and enhancement of the visualized left upper extremity musculature, most pronounced proximally, most notably of the deltoid and biceps musculature as detailed above.  Findings are nonspecific although could relate to a nonspecific myelitis particularly in light of patient's reported history.  Clinical correlation with appropriate history and lab markers advised.  2. No evidence of abscess or osteomyelitis.  This  report was flagged in Epic as abnormal.     NM PET CT 12/27/18:  Impression       See above    Significant improvement in all the previously seen lymph nodes involving the left lower neck, supraclavicular region, axillary and retropectoral region.    Index left retropectoral SUV max 3.57, previously 27.2.     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 Biopsied.                        - Small hiatal hernia.                        - Normal stomach.                        - Normal examined duodenum.  Recommendation:       - Return patient to hospital to for ongoing care.                        - Await pathology results.                        - Use a proton pump inhibitor PO BID.                        - The findings and recommendations were discussed                         with the designated responsible adult.                        - Repeat upper endoscopy in 8 weeks to check                         healing.           Assessment/Plan:     * Myositis    57yo F with history of L sided infiltrating ductal carcinoma of the L breast (dx on Jan 2017), HTN, depression, and gastritis was send from hem/onc clinic for evaluation of possible inflammatory myositis.      Patient started on Atelizumab/Abraxane on 11/7/18. Per chart review, patient noticed rashes on the dorsum of her hands on 11/11/18. Seen in urgent care and given topical steroid cream. Then received two more infusions on Atelizumab/Abraxane on 11/21/18 and 12/5/18. Started to notice puffiness around the eyes on 12/11/18. Received another Abraxane infusion on 12/12/18 but this time with hydrocortisone 50 mg which helped reduce the swelling around the eyes. Developed swelling of the face again on 12/15/18. Next infusion of Abraxane done on 12/19/18 with Solucortef and Atelizumab held. Abraxane given again on 1/3/19 with no IV steroid. Patient  "developed swelling of the face on 1/4/19 and went to urgent care and given short course of prednisone 20 mg BID. On 1/15/19, patient seen in hem/onc clinic with c/o of pressure and tightness around neck. CT scan of chest and neck did not reveal any vascular compression. Started on prednisone 60 mg with taper. On 1/22/18 patient with c/o proximal muscle weakness. CPK found to be elevated around 4k. Patient admitted and given solumedrol 80 mg IV on 1/22/18. Last infusion of Atelizumab on 12/19/18. Last infusion of Abraxane on 1/3/19. Given Solumedrol 1g x 1 on 1/23/18. Seen by Dermatology on 1/24/18 and biopsy done of skin rash. Given distribution of rashes, there was concern for dermatomyositis. Patient started on Solumedrol 125 mg IV BID at this time.      Labs: ESR 50, CRP 31.1, CPK 4562 (trending down), Aldolase 13.6.  , ALT 64.  UA normal.  CBC unremarkable.  +DARCY 1:640 speckled with negative profile.  Complements normal. Normal GGT. RPR, HIV negative. Hep B/C negative.  Strongyloides negative. HMGCR antibody negative.  Ferritin elevated at 641, iron on low side at 37, tibc low at 247, transferrin low 167, haptoglobin 153, retic slightly increased at 2.6%, ldh increased at 325. quantTB: indeterminate     Spirometry: normal, normal diffusion capacity. FVC: 81%, DLCO: 114%     Case reports of docetaxel related inflammatory myositis had been documented. "...taxanes have been noted to cause disabling but transient arthralgia and myalgias; it is important to consider the possibility of inflammatory myopathy as a possible complication in patients undergoing treatment with these agents." - A case of docetaxel induced myositis and review of literature. Austin et al 2015.     Case reports in Rheumatology   Case reports of atezolizumb (immunomodulator) cause of myositis  - Lcuy et al 2017 "Myositis as an adverse even of immune checkpoint blockade for cancer therapy.  Seminars in Arthritis and Rheumatism "      Patient exhibits signs of dermatomyositis (heliotropic rash and gottron's papules).   Dermatomyositis can be associated with malignancy.  MRI of LUE showed edema of the deltoid and biceps.  Exam with proximal muscle weakness: b/l deltoids 4/5, b/l iliopsoas 4.4/5. 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     Failed 2nd swallow eval on 2/6/19. Will be going for PEG placement     Inpatient treatments so far:  - Solumedrol 80 mg IV x 1 on 1/22/19  - Solumedrol 1 g IV x 1 on 1/23/19.  - Solumedrol 125 mg IV BID since 1/24/19 -->being tapered down.  - IVIG 1/29/19-2/2     Plan:  - chemotherapy can be re-started as it may help treat Dermatomyositis. Would recommend against checkpoint inhibitor, however, as it may have been a trigger for the dermatomyositis  - continue to monitor CPK, aldolase, AST/ALT  - Change Solumedrol to 24 mg IV q 12h. Once swallowing re-evaluated can change to Medrol 48 mg oral daily.  - completed IVIg 400 mg/kg daily x 5 days. Will need IVIg 1 g/kg on 2/28/19.   - should get colonoscopy as outpatient as part of malignancy work-up   - MTX to 20 mg sc weekly (Tuesdays) with daily folic acid.   - check TB-spot (quant TB indeterminate)  - recommend Rehab after discharge to help with strengthening.  - recommend ID fast track consult for vaccinations.  - f/u esophageal biopsy results  - f/u myomarker panel   - PFTs with lung volumes as outpatient.   - 1200 mg dietary calcium and Vitamin D3 1000 mg daily  - continue H2 blocker  - f/u with Dr. Swift and Dr. Campos in Rheumatology on 2/15/19 at 2PM           Wilner Reynolds MD  Rheumatology  [facility]      I  Have personally take the history and examined the patient and agree with fellow's note as  stated above. The CK  down to 357. sTR normal c/w ACD. Swallowing study today +aspiration, just back from PEG this afternoon. Received methotrexate 20mg sc q Wed.(yesteday) Continues folic acid 1mg daily. Continues Solu-Medrol 24 IV q 12hrs. Plan IVIg 1g/kg maitenance dose  End of Feb and monthly.  Cont PT, plan transfer to Rehab for intensive physical, occupational and speech therapy.

## 2019-02-07 NOTE — PROGRESS NOTES
[facility]  Gastroenterology  Progress Note    Patient Name: Ermelinda Verde  MRN: [id number]  Admission Date: 2019  Hospital Length of Stay: 16 days  Code Status: Full Code   Attending Provider: Conrad Cheung MD  Consulting Provider: Alisia Blas MD  Primary Care Physician: Reta Weeks MD  Principal Problem: Myositis      Subjective:     Interval History:   Patient is known to our service as she was scoped last week.  At this point in time she has failed multiple swallow evaluation and the team is inquiring about PEG tube placement.     VSS and labs stable.   She is on Lovenox for DVT prophy (last dose yesterday) and no further AC.  Prior surgeries include a  and breast reconstruction which required a lower abdominal incision.    Review of Systems   Constitutional: Negative for activity change, appetite change, chills, diaphoresis, fatigue, fever and unexpected weight change.   HENT: Positive for trouble swallowing. Negative for voice change.    Eyes: Negative for photophobia, pain, discharge, redness and itching.   Respiratory: Negative for cough and shortness of breath.    Cardiovascular: Negative for palpitations and leg swelling.   Gastrointestinal: Negative for abdominal distention, abdominal pain, anal bleeding, blood in stool, constipation, diarrhea, nausea, rectal pain and vomiting.   Endocrine: Negative for cold intolerance, heat intolerance, polydipsia, polyphagia and polyuria.   Genitourinary: Negative for dysuria, frequency and urgency.   Musculoskeletal: Negative for back pain and neck pain.   Neurological: Negative for dizziness, weakness and light-headedness.     Objective:     Vital Signs (Most Recent):  Temp: 97.8 °F (36.6 °C) (19 1155)  Pulse: 93 (19 1155)  Resp: 18 (19 1155)  BP: 133/70 (19 1155)  SpO2: 98 % (19 1155) Vital Signs (24h Range):  Temp:  [97.7 °F (36.5 °C)-98.5 °F (36.9 °C)] 97.8 °F (36.6 °C)  Pulse:  [] 93  Resp:   [17-19] 18  SpO2:  [95 %-99 %] 98 %  BP: (111-149)/(63-71) 133/70     Weight: 73.4 kg (161 lb 13.1 oz) (19 0400)  Body mass index is 26.93 kg/m².      Intake/Output Summary (Last 24 hours) at 2019 1348  Last data filed at 2019 0600  Gross per 24 hour   Intake --   Output 1250 ml   Net -1250 ml       Lines/Drains/Airways     Central Venous Catheter Line                 Port A Cath Single Lumen right subclavian -- days          Drain                 NG/OG Tube 19 0927 14 Fr. Right nostril 9 days                Physical Exam   Constitutional: She is oriented to person, place, and time. No distress.   HENT:   Head: Normocephalic and atraumatic.   Eyes: No scleral icterus.   Cardiovascular: Normal rate and regular rhythm.   Pulmonary/Chest: Effort normal and breath sounds normal.   Abdominal: Soft. Bowel sounds are normal. She exhibits no distension and no mass. There is no tenderness. There is no rebound and no guarding. No hernia.   Lower abdominal scar from prior  and flap reconstruction, no scars where we would place PEG   Musculoskeletal: She exhibits no edema.   Neurological: She is alert and oriented to person, place, and time.   Skin: She is not diaphoretic.   Vitals reviewed.      Significant Labs:  CBC:   Recent Labs   Lab 19  0409 19  0350   WBC 11.62 8.85   HGB 7.7* 7.5*   HCT 24.8* 24.4*    179     CMP:   Recent Labs   Lab 19  0350   GLU 96   CALCIUM 9.0   ALBUMIN 2.8*   PROT 6.8   *   K 4.4   CO2 28      BUN 27*   CREATININE 0.6   ALKPHOS 66   ALT 35   AST 87*   BILITOT 0.5     Coagulation: No results for input(s): PT, INR, APTT in the last 48 hours.      Significant Imaging:  Imaging results within the past 24 hours have been reviewed.    Assessment/Plan:     Dysphagia    58 year old female with a history of recurrent breast cancer and dermatomyositis known to our service as she was scoped last week. Team contacted us again as she has failed  her swallow evaluations and will need a PEG (hopefully only temporary).     Patient agreeable to PEG, understand all the risk/benefits.  No signs of infection with last dose of Lovenox yesterday.  Has a lower abdominal incision from /site of breast reconstruction.    Recommendations:  --NPO  --Ruma hold any DVT prophylaxis 12 hours prior to the procedure  --Please order Cefazolin 1g IV x 1 (Vancomycin 1g IV if PCN allergic) on call for the procedure (order placed)             Thank you for your consult. I will follow-up with patient. Please contact us if you have any additional questions.    Alisia Blas M.D.  Gastroenterology Fellow, PGY-V  Pager: [phone]  [facility]

## 2019-02-07 NOTE — PLAN OF CARE
Problem: Adult Inpatient Plan of Care  Goal: Plan of Care Review  Outcome: Ongoing (interventions implemented as appropriate)  AAOx4. Remains free from falls and injury this shift. Bed in low, locked position with call light in reach.  Encouraged to call for assistance when getting out of bed; verbalized understanding.  Reports sore throat, but is less congested, breathing better, and swallowing more easily. Failed SS remains NPO.  Decreased U/O ; Care team aware. All belongings within reach will continue to monitor. Possible PEG tube to be placed.

## 2019-02-07 NOTE — ASSESSMENT & PLAN NOTE
58 year old female with a history of recurrent breast cancer and dermatomyositis known to our service as she was scoped last week. Team contacted us again as she has failed her swallow evaluations and will need a PEG (hopefully only temporary).     Patient agreeable to PEG, understand all the risk/benefits.  No signs of infection with last dose of Lovenox yesterday.  Has a lower abdominal incision from /site of breast reconstruction.    Recommendations:  --NPO  --Ruma hold any DVT prophylaxis 12 hours prior to the procedure  --Please order Cefazolin 1g IV x 1 (Vancomycin 1g IV if PCN allergic) on call for the procedure (order placed)

## 2019-02-07 NOTE — PLAN OF CARE
MDRs completed with Dr. Cheung and the team. Patient failed her modified barium swallow study on 2/6/19. Plans for PEG tube placement tomorrow. MD to order IVF today for hydration while awaiting PEG. MD placed a Nutrition consultation for education and nutrition advice. MTX 20mg given 2/6/2019. Discharge disposition home with home health PT/OT/skilled nurse/SLP therapy. Rheumatology continues to follow with the Med Onc team. MD discussed plan with patient at the bedside; patient verbalized understanding. CM to continue to follow with the team.    Nicole Carter, RN, BSN, CM  [facility]  Nurse - Med Onc/Gyn Onc  [phone]

## 2019-02-07 NOTE — SUBJECTIVE & OBJECTIVE
Subjective:     Interval History:   Patient is known to our service as she was scoped last week.  At this point in time she has failed multiple swallow evaluation and the team is inquiring about PEG tube placement.     VSS and labs stable.   She is on Lovenox for DVT prophy (last dose yesterday) and no further AC.  Prior surgeries include a  and breast reconstruction which required a lower abdominal incision.    Review of Systems   Constitutional: Negative for activity change, appetite change, chills, diaphoresis, fatigue, fever and unexpected weight change.   HENT: Positive for trouble swallowing. Negative for voice change.    Eyes: Negative for photophobia, pain, discharge, redness and itching.   Respiratory: Negative for cough and shortness of breath.    Cardiovascular: Negative for palpitations and leg swelling.   Gastrointestinal: Negative for abdominal distention, abdominal pain, anal bleeding, blood in stool, constipation, diarrhea, nausea, rectal pain and vomiting.   Endocrine: Negative for cold intolerance, heat intolerance, polydipsia, polyphagia and polyuria.   Genitourinary: Negative for dysuria, frequency and urgency.   Musculoskeletal: Negative for back pain and neck pain.   Neurological: Negative for dizziness, weakness and light-headedness.     Objective:     Vital Signs (Most Recent):  Temp: 97.8 °F (36.6 °C) (19 1155)  Pulse: 93 (19 1155)  Resp: 18 (19 1155)  BP: 133/70 (19 1155)  SpO2: 98 % (19 1155) Vital Signs (24h Range):  Temp:  [97.7 °F (36.5 °C)-98.5 °F (36.9 °C)] 97.8 °F (36.6 °C)  Pulse:  [] 93  Resp:  [17-19] 18  SpO2:  [95 %-99 %] 98 %  BP: (111-149)/(63-71) 133/70     Weight: 73.4 kg (161 lb 13.1 oz) (19 0400)  Body mass index is 26.93 kg/m².      Intake/Output Summary (Last 24 hours) at 2019 1348  Last data filed at 2019 0600  Gross per 24 hour   Intake --   Output 1250 ml   Net -1250 ml       Lines/Drains/Airways     Central  Venous Catheter Line                 Port A Cath Single Lumen right subclavian -- days          Drain                 NG/OG Tube 19 0927 14 Fr. Right nostril 9 days                Physical Exam   Constitutional: She is oriented to person, place, and time. No distress.   HENT:   Head: Normocephalic and atraumatic.   Eyes: No scleral icterus.   Cardiovascular: Normal rate and regular rhythm.   Pulmonary/Chest: Effort normal and breath sounds normal.   Abdominal: Soft. Bowel sounds are normal. She exhibits no distension and no mass. There is no tenderness. There is no rebound and no guarding. No hernia.   Lower abdominal scar from prior  and flap reconstruction, no scars where we would place PEG   Musculoskeletal: She exhibits no edema.   Neurological: She is alert and oriented to person, place, and time.   Skin: She is not diaphoretic.   Vitals reviewed.      Significant Labs:  CBC:   Recent Labs   Lab 19  0409 19  0350   WBC 11.62 8.85   HGB 7.7* 7.5*   HCT 24.8* 24.4*    179     CMP:   Recent Labs   Lab 19  0350   GLU 96   CALCIUM 9.0   ALBUMIN 2.8*   PROT 6.8   *   K 4.4   CO2 28      BUN 27*   CREATININE 0.6   ALKPHOS 66   ALT 35   AST 87*   BILITOT 0.5     Coagulation: No results for input(s): PT, INR, APTT in the last 48 hours.      Significant Imaging:  Imaging results within the past 24 hours have been reviewed.

## 2019-02-07 NOTE — CONSULTS
Pt being follow by RD see 2/5 note for full assessment:  Recommendations     Recommendation/Intervention:   Recommend Isosource 1.5 @65ml/hr +water flushes q6hrs;               Provides 2340kcal, 106g/Pro, 1214ml free fl. flushes per MD.              Meets 100% EEN/EPN.                        Start w 10ml/hr and increase by 10 ml/hr q4hr as tolerated until goal rate achieved.               Hold for residuals > 500ml

## 2019-02-07 NOTE — ANESTHESIA PREPROCEDURE EVALUATION
02/07/2019  Ermelinda Verde is a 58 y.o., female with hx of breast ca, dermatomyositis recent admission for rhabdo now with dysphagia to solids and liquids here for below procedure.    Pre-operative evaluation for Procedure(s) (LRB):  INSERTION, PEG TUBE (N/A)    Ermelinda Verde is a 58 y.o. female     Patient Active Problem List   Diagnosis    Hypertension    Breast cancer of upper-outer quadrant of left female breast    Encounter for antineoplastic chemotherapy    Vitamin B12 deficiency    Adjustment disorder with depressed mood    Pre-diabetes    Regional lymph node metastasis present    Breast cancer in female    Drug rash    Myositis    Rhabdomyolysis    Candidiasis    Dermatitis    Swelling of upper arm    Dysphagia    Hypokalemia    Dermatomyositis    Polymyositis associated with autoimmune disease    Congestion of upper airway    Moderate malnutrition       Review of patient's allergies indicates:  No Known Allergies    No current facility-administered medications on file prior to encounter.      Current Outpatient Medications on File Prior to Encounter   Medication Sig Dispense Refill    ALPRAZolam (XANAX) 0.25 MG tablet Take 1 tablet (0.25 mg total) by mouth 3 (three) times daily. 75 tablet 0    amlodipine-benazepril (LOTREL) 10-40 mg per capsule TK 1 C PO QD 90 capsule 3    CALCIUM CARBONATE (CALCIUM 500 ORAL) Take by mouth once daily.       cyanocobalamin (VITAMIN B-12) 500 MCG tablet Take 500 mcg by mouth once daily.      nystatin (MYCOSTATIN) 100,000 unit/mL suspension Take 5 mLs (500,000 Units total) by mouth 4 (four) times daily. for 10 days 473 mL 0    vitamin D (VITAMIN D3) 1000 units Tab Take 1,000 Units by mouth once daily.      cetirizine (ZYRTEC) 10 MG tablet Take 1 tablet (10 mg total) by mouth once daily. for 10 days 10 tablet 0    escitalopram oxalate  (LEXAPRO) 10 MG tablet Take 1 tablet (10 mg total) by mouth once daily. 30 tablet 2    famotidine (PEPCID) 40 MG tablet Take 1 tablet (40 mg total) by mouth every evening. for 10 days 10 tablet 0    fluticasone (FLONASE) 50 mcg/actuation nasal spray SHAKE LIQUID AND USE 1 SPRAY(50 MCG) IN EACH NOSTRIL EVERY DAY 16 mL 5    predniSONE (DELTASONE) 20 MG tablet Take 3 tablets daily for 2 days, 2 tablets daily for 2 days then 1 tablet daily 14 tablet 0       Past Surgical History:   Procedure Laterality Date    BIOPSY-SENTINEL NODE Left 2017    [facility]    BREAST BIOPSY Left     BREAST RECONSTRUCTION Left 2017     SECTION      COLONOSCOPY  2011    repeat in 10 yrs.    D&C      EGD (ESOPHAGOGASTRODUODENOSCOPY) N/A 2019    Performed by [facility]ernell [facility]as [facility] at [facility] ENDO (2ND FLR)    FRJVFODWX-CVOA-O-CATH Right 10/31/2018    Performed by Deo Palencia [facility] at [facility] OR 2ND FLR    PBIMSXXFJ-MGAQ-V-CATH Right 2017    [facility] at [facility]    ZPFTFEMLN-KBJB-E-CATH-neck or chest Fluoro needed Consent AM of surgery Right 10/30/2018    Performed by De[facility] Palencia MD at [facility] OR 2ND FLR    MASTECTOMY Left 2017    MASTECTOMY Left 2017    Per[facility] by [facility] at [facility] OR    MYOMECTOMY      PORTACATH PLACEMENT      RECONSTRUCTION-BREAST/FLAP-SCOTT Left 2017    Performed by [facility]jinder Sewell [facility] at [facility] OR    SENTINEL LYMPH NODE BIOPSY  2017    left    TOTAL REDUCTION MAMMOPLASTY Right 2017       Social History     Socioeconomic History    Marital status:      Spouse name: Not on file    Number of children: Not on file    Years of education: Not on file    Highest education level: Not on file   Social Needs    Financial resource strain: Not on file    Food insecurity - worry: Not on file    Food insecurity - inability: Not on file    Transportation needs - medical: Not on file    Transportation  needs - non-medical: Not on file   Occupational History    Occupation: banking   Tobacco Use    Smoking status: Never Smoker    Smokeless tobacco: Never Used   Substance and Sexual Activity    Alcohol use: Yes     Comment: wine    Drug use: No    Sexual activity: Yes     Partners: Male     Birth control/protection: Post-menopausal   Other Topics Concern    Not on file   Social History Narrative    Not on file         CBC:   Recent Labs     19  0409 19  0350   WBC 11.62 8.85   RBC 2.88* 2.80*   HGB 7.7* 7.5*   HCT 24.8* 24.4*    179   MCV 86 87   MCH 26.7* 26.8*   MCHC 31.0* 30.7*       CMP:   Recent Labs     19  0409 19  0350   * 134*   K 4.2 4.4   CL 99 100   CO2 28 28   BUN 25* 27*   CREATININE 0.6 0.6   * 96   MG 1.9 2.1   PHOS 2.9 4.1   CALCIUM 8.8 9.0   ALBUMIN 2.6* 2.8*   PROT 6.7 6.8   ALKPHOS 79 66   ALT 36 35   AST 90* 87*   BILITOT 0.3 0.5       EKD Echo:  Results for orders placed or performed during the hospital encounter of 17   2D echo with color flow doppler   Result Value Ref Range    QEF 65 55 - 65    Diastolic Dysfunction No     Est. PA Systolic Pressure 23.43     Pericardial Effusion TRIVIAL     Tricuspid Valve Regurgitation TRIVIAL TO MILD        Anesthesia Evaluation    I have reviewed the Patient Summary Reports.     I have reviewed the Medications.     Review of Systems  Anesthesia Hx:  No problems with previous Anesthesia  History of prior surgery of interest to airway management or planning:  Denies Personal Hx of Anesthesia complications.   Social:  Non-Smoker    Hematology/Oncology:        Current/Recent Cancer. Breast chemotherapy and surgery   Cardiovascular:   Hypertension    Pulmonary:  Pulmonary Normal    Hepatic/GI:   dysphagia   Neurological:   Neuromuscular Disease, dermatomyositis   Endocrine:  Endocrine Normal        Physical Exam  General:  Well nourished    Airway/Jaw/Neck:  Airway Findings: Mouth Opening:  Normal Tongue: Normal  General Airway Assessment: Adult  Improves to II with phonation.  TM Distance: Normal, at least 6 cm      Dental:  Dental Findings: In tact        Mental Status:  Mental Status Findings:  Cooperative, Alert and Oriented         Anesthesia Plan  Type of Anesthesia, risks & benefits discussed:  Anesthesia Type:  general  Patient's Preference:   Intra-op Monitoring Plan: standard ASA monitors  Intra-op Monitoring Plan Comments:   Post Op Pain Control Plan: per primary service following discharge from PACU  Post Op Pain Control Plan Comments:   Induction:   IV  Beta Blocker:  Patient is not currently on a Beta-Blocker (No further documentation required).       Informed Consent: Patient understands risks and agrees with Anesthesia plan.  Questions answered. Anesthesia consent signed with patient.  ASA Score: 3     Day of Surgery Review of History & Physical:    H&P update referred to the surgeon.         Ready For Surgery From Anesthesia Perspective.

## 2019-02-07 NOTE — PLAN OF CARE
CM received a call from bedside RN. Patient stated that Rheumatology is recommending inpatient rehab after discharge. CM to investigate.    CM noted that PT/OT have been ordered to eval and treat patient. PT/OT recs are currently for Home Health PT/OT with possibility to progress to Outpatient rehab.    CM noted that PT is assigned to see patient today. CM to follow up on PT note from today to see if recs have changed. Insurance unlikely to approve inpatient rehab if recs are for Home Health. Patient ambulated 590 feet with a RW independently.    Nicole Carter, RN, BSN, CM  [facility]  Nurse - Med Onc/Gyn Onc  [phone]

## 2019-02-07 NOTE — [employer] PATIENT INSTRUCTIONS
Discharge Summary/Instructions after an Endoscopic Procedure  Patient Name: Ermelinda Verde  Patient MRN: [id number]  Patient YOB: 1960 Thursday, February 07, 2019  Zurdo Gordon MD  RESTRICTIONS:  During your procedure today, you received medications for sedation.  These   medications may affect your judgment, balance and coordination.  Therefore,   for 24 hours, you have the following restrictions:   - DO NOT drive a car, operate machinery, make legal/financial decisions,   sign important papers or drink alcohol.    ACTIVITY:  Today: no heavy lifting, straining or running due to procedural   sedation/anesthesia.  The following day: return to full activity including work.  DIET:  Eat and drink normally unless instructed otherwise.     TREATMENT FOR COMMON SIDE EFFECTS:  - Mild abdominal pain, nausea, belching, bloating or excessive gas:  rest,   eat lightly and use a heating pad.  - Sore Throat: treat with throat lozenges and/or gargle with warm salt   water.  - Because air was used during the procedure, expelling large amounts of air   from your rectum or belching is normal.  - If a bowel prep was taken, you may not have a bowel movement for 1-3 days.    This is normal.  SYMPTOMS TO WATCH FOR AND REPORT TO YOUR PHYSICIAN:  1. Abdominal pain or bloating, other than gas cramps.  2. Chest pain.  3. Back pain.  4. Signs of infection such as: chills or fever occurring within 24 hours   after the procedure.  5. Rectal bleeding, which would show as bright red, maroon, or black stools.   (A tablespoon of blood from the rectum is not serious, especially if   hemorrhoids are present.)  6. Vomiting.  7. Weakness or dizziness.  GO DIRECTLY TO THE NEAREST EMERGENCY ROOM IF YOU HAVE ANY OF THE FOLLOWING:      Difficulty breathing              Chills and/or fever over 101 F   Persistent vomiting and/or vomiting blood   Severe abdominal pain   Severe chest pain   Black, tarry stools   Bleeding- more than one  tablespoon   Any other symptom or condition that you feel may need urgent attention  Your doctor recommends these additional instructions:  If any biopsies were taken, your doctors clinic will contact you in 1 to 2   weeks with any results.  - Return patient to hospital to for ongoing care.   - Please follow the post-PEG recommendations including: Nutrition consult   for formula and volume, change dressing once per day and NPO x4 hrs then   water today.   - Continue present medications.   - Please refer to recent EGD report for prior recommendations regarding   repeat procedure, etc.  - The findings and recommendations were discussed with the patient's   family.  For questions, problems or results please call your physician - Zurdo Gordon MD at Work:  [phone].  [facility], EMERGENCY ROOM PHONE NUMBER: [phone]  IF A COMPLICATION OR EMERGENCY SITUATION ARISES AND YOU ARE UNABLE TO REACH   YOUR PHYSICIAN - GO DIRECTLY TO THE EMERGENCY ROOM.  Zurdo Gordon MD  2/7/2019 3:32:59 PM  This report has been verified and signed electronically.  [employer]

## 2019-02-07 NOTE — TREATMENT PLAN
GI Post-Procedure Note    19 F PEG tube placed without any immediate complications. External bumper at 3 cm yuniel.  Nothing per PEG x 4hrs then meds and water overnight.    We will follow up with patient tomorrow, if site check OK tomorrow, can start feedings via PEG.    Clinton Yost MD  Gastroenterology Fellow (PGY-VI)  Pager: [phone]

## 2019-02-07 NOTE — SUBJECTIVE & OBJECTIVE
Interval History:   NAEO, failed swallow study. Awaiting PEG tube placement tomorrow, nutrition consultation for education and nutrition advice. MTX 20mg given 2/6/2019    Oncology Treatment Plan:   OP BREAST DOCETAXEL Q3W    Medications:  Continuous Infusions:  Scheduled Meds:   amLODIPine  10 mg Per NG tube Daily    calcium-vitamin D3  1 tablet Oral BID    enoxaparin  40 mg Subcutaneous Daily    escitalopram oxalate  10 mg Per NG tube Daily    famotidine  20 mg Per NG tube BID    folic acid  1 mg Oral Daily    guaifenesin 100 mg/5 ml  200 mg Per NG tube Q4H    methylPREDNISolone sodium succinate  24 mg Intravenous Q12H     PRN Meds:acetaminophen, ALPRAZolam, dextrose 50%, dextrose 50%, glucagon (human recombinant), glucose, glucose, hepatitis B, hydrALAZINE, insulin aspart U-100, morphine, ondansetron, pneumoc 13-brandin conj-dip cr(PF), polyethylene glycol, promethazine, sodium chloride, sodium chloride 0.9%, sodium chloride 0.9%     Review of Systems   Constitutional: Positive for fatigue. Negative for activity change, appetite change, chills, diaphoresis, fever and unexpected weight change.   HENT: Positive for facial swelling and trouble swallowing. Negative for congestion, ear discharge, hearing loss, postnasal drip, sinus pressure, sneezing, sore throat and tinnitus.    Eyes: Negative for photophobia, pain and redness.   Respiratory: Negative for apnea, cough, choking, chest tightness, shortness of breath and stridor.    Cardiovascular: Negative for chest pain, palpitations and leg swelling.   Gastrointestinal: Negative for abdominal pain, anal bleeding, constipation, diarrhea, nausea and rectal pain.   Endocrine: Negative for polyuria.   Genitourinary: Negative for dysuria and hematuria.   Musculoskeletal: Negative for arthralgias, back pain, gait problem, joint swelling, myalgias, neck pain and neck stiffness.   Skin: Positive for rash. Negative for color change and pallor.   Allergic/Immunologic:  Negative for immunocompromised state.   Neurological: Positive for weakness. Negative for dizziness, seizures and numbness.   Hematological: Positive for adenopathy. Does not bruise/bleed easily.   Psychiatric/Behavioral: Negative for agitation and behavioral problems.     Objective:     Vital Signs (Most Recent):  Temp: 97.7 °F (36.5 °C) (02/07/19 0828)  Pulse: 108 (02/07/19 0828)  Resp: 18 (02/07/19 0828)  BP: 120/69 (02/07/19 0828)  SpO2: 99 % (02/07/19 0828) Vital Signs (24h Range):  Temp:  [97.7 °F (36.5 °C)-98.5 °F (36.9 °C)] 97.7 °F (36.5 °C)  Pulse:  [] 108  Resp:  [17-19] 18  SpO2:  [95 %-99 %] 99 %  BP: (111-149)/(63-71) 120/69     Weight: 73.4 kg (161 lb 13.1 oz)  Body mass index is 26.93 kg/m².  Body surface area is 1.83 meters squared.      Intake/Output Summary (Last 24 hours) at 2/7/2019 1046  Last data filed at 2/7/2019 0600  Gross per 24 hour   Intake --   Output 1250 ml   Net -1250 ml       Physical Exam   Constitutional: She is oriented to person, place, and time. She appears well-developed and well-nourished. No distress.   HENT:   Head: Atraumatic.   Nose: Nose normal.   Mouth/Throat: No oropharyngeal exudate.   Eyes: Conjunctivae and EOM are normal. Pupils are equal, round, and reactive to light. Right eye exhibits no discharge. Left eye exhibits no discharge. No scleral icterus.   Neck: Normal range of motion. Neck supple. No tracheal deviation present.   Cardiovascular: Normal rate, regular rhythm and intact distal pulses. Exam reveals no gallop and no friction rub.   Pulmonary/Chest: Effort normal and breath sounds normal. No respiratory distress. She has no wheezes. She has no rales. She exhibits no tenderness.   Abdominal: Soft. Bowel sounds are normal. She exhibits no distension and no mass. There is no tenderness. There is no rebound and no guarding.   Musculoskeletal: Normal range of motion. She exhibits no tenderness or deformity.   Neurological: She is alert and oriented to  person, place, and time. No cranial nerve deficit or sensory deficit.     4.5/5 shoulder strength on shoulder abduction; improving  5/5 flexion and extension preserved at elbow   Skin: Skin is warm and dry. Capillary refill takes less than 2 seconds. No rash noted. She is not diaphoretic. No erythema. No pallor.   Psychiatric: She has a normal mood and affect.   Vitals reviewed.      Significant Labs:   BMP:   Recent Labs   Lab 02/06/19 0409 02/07/19  0350   * 96   * 134*   K 4.2 4.4   CL 99 100   CO2 28 28   BUN 25* 27*   CREATININE 0.6 0.6   CALCIUM 8.8 9.0   MG 1.9 2.1   , CBC:   Recent Labs   Lab 02/06/19 0409 02/07/19 0350   WBC 11.62 8.85   HGB 7.7* 7.5*   HCT 24.8* 24.4*    179   , CMP:   Recent Labs   Lab 02/06/19 0409 02/07/19  0350   * 134*   K 4.2 4.4   CL 99 100   CO2 28 28   * 96   BUN 25* 27*   CREATININE 0.6 0.6   CALCIUM 8.8 9.0   PROT 6.7 6.8   ALBUMIN 2.6* 2.8*   BILITOT 0.3 0.5   ALKPHOS 79 66   AST 90* 87*   ALT 36 35   ANIONGAP 5* 6*   EGFRNONAA >60.0 >60.0   , Coagulation: No results for input(s): PT, INR, APTT in the last 48 hours., Haptoglobin:   No results for input(s): HAPTOGLOBIN in the last 48 hours., Immunology: No results for input(s): SPEP, ERVIN, DARCY, FREELAMBDALI in the last 48 hours., LDH: No results for input(s): LDHCSF, BFSOURCE in the last 48 hours., LFTs:   Recent Labs   Lab 02/06/19 0409 02/07/19 0350   ALT 36 35   AST 90* 87*   ALKPHOS 79 66   BILITOT 0.3 0.5   PROT 6.7 6.8   ALBUMIN 2.6* 2.8*   , Reticulocytes:   No results for input(s): RETIC in the last 48 hours., Tumor Markers: No results for input(s): PSA, CEA, , AFPTM, [id number],  in the last 48 hours.    Invalid input(s): ALGTM, Uric Acid No results for input(s): URICACID in the last 48 hours., Urine Studies: No results for input(s): COLORU, APPEARANCEUA, PHUR, SPECGRAV, PROTEINUA, GLUCUA, KETONESU, BILIRUBINUA, OCCULTUA, NITRITE, UROBILINOGEN, LEUKOCYTESUR, RBCUA, WBCUA,  BACTERIA, SQUAMEPITHEL, HYALINECASTS in the last 48 hours.    Invalid input(s): YANET,   Recent Lab Results       02/07/19  0350        Immature Granulocytes 0.7     Immature Grans (Abs) 0.06  Comment:  Mild elevation in immature granulocytes is non specific and   can be seen in a variety of conditions including stress response,   acute inflammation, trauma and pregnancy. Correlation with other   laboratory and clinical findings is essential.       Albumin 2.8     Alkaline Phosphatase 66     ALT 35     Anion Gap 6     AST 87     Baso # 0.00     Basophil% 0.0     Total Bilirubin 0.5  Comment:  For infants and newborns, interpretation of results should be based  on gestational age, weight and in agreement with clinical  observations.  Premature Infant recommended reference ranges:  Up to 24 hours.............<8.0 mg/dL  Up to 48 hours............<12.0 mg/dL  3-5 days..................<15.0 mg/dL  6-29 days.................<15.0 mg/dL       BUN, Bld 27     Calcium 9.0     Chloride 100     CO2 28          Creatinine 0.6     Differential Method Automated     eGFR if African American >60.0     eGFR if non  >60.0  Comment:  Calculation used to obtain the estimated glomerular filtration  rate (eGFR) is the CKD-EPI equation.        Eos # 0.0     Eosinophil% 0.0     Glucose 96     Gran # (ANC) 7.8     Gran% 87.5     Hematocrit 24.4     Hemoglobin 7.5     Lymph # 0.5     Lymph% 5.9     Magnesium 2.1     MCH 26.8     MCHC 30.7     MCV 87     Mono # 0.5     Mono% 5.9     MPV 10.8     nRBC 0     Phosphorus 4.1     Platelets 179     Potassium 4.4     Total Protein 6.8     RBC 2.80     RDW 19.6     Sodium 134     WBC 8.85        and All pertinent labs from the last 24 hours have been reviewed.    Diagnostic Results:  I have reviewed and interpreted all pertinent imaging results/findings within the past 24 hours.

## 2019-02-08 ENCOUNTER — TELEPHONE (OUTPATIENT)
Dept: HEMATOLOGY/ONCOLOGY | Facility: CLINIC | Age: 59
End: 2019-02-08

## 2019-02-08 PROBLEM — Z74.09 IMPAIRED FUNCTIONAL MOBILITY AND ENDURANCE: Status: ACTIVE | Noted: 2019-02-08

## 2019-02-08 LAB
ALBUMIN SERPL BCP-MCNC: 2.5 G/DL
ALDOLASE SERPL-CCNC: 4.6 U/L
ALDOLASE SERPL-CCNC: 5.4 U/L
ALP SERPL-CCNC: 58 U/L
ALT SERPL W/O P-5'-P-CCNC: 36 U/L
ANION GAP SERPL CALC-SCNC: 6 MMOL/L
AST SERPL-CCNC: 76 U/L
BASOPHILS # BLD AUTO: 0 K/UL
BASOPHILS NFR BLD: 0 %
BILIRUB SERPL-MCNC: 0.5 MG/DL
BUN SERPL-MCNC: 19 MG/DL
CALCIUM SERPL-MCNC: 8.5 MG/DL
CHLORIDE SERPL-SCNC: 103 MMOL/L
CK SERPL-CCNC: 236 U/L
CO2 SERPL-SCNC: 26 MMOL/L
CREAT SERPL-MCNC: 0.6 MG/DL
DIFFERENTIAL METHOD: ABNORMAL
EOSINOPHIL # BLD AUTO: 0 K/UL
EOSINOPHIL NFR BLD: 0 %
ERYTHROCYTE [DISTWIDTH] IN BLOOD BY AUTOMATED COUNT: 19.9 %
EST. GFR  (AFRICAN AMERICAN): >60 ML/MIN/1.73 M^2
EST. GFR  (NON AFRICAN AMERICAN): >60 ML/MIN/1.73 M^2
GLUCOSE SERPL-MCNC: 123 MG/DL
HCT VFR BLD AUTO: 23.4 %
HGB BLD-MCNC: 7.3 G/DL
IMM GRANULOCYTES # BLD AUTO: 0.04 K/UL
IMM GRANULOCYTES NFR BLD AUTO: 0.4 %
LYMPHOCYTES # BLD AUTO: 0.4 K/UL
LYMPHOCYTES NFR BLD: 4.1 %
MAGNESIUM SERPL-MCNC: 2 MG/DL
MCH RBC QN AUTO: 28.2 PG
MCHC RBC AUTO-ENTMCNC: 31.2 G/DL
MCV RBC AUTO: 90 FL
MONOCYTES # BLD AUTO: 0.4 K/UL
MONOCYTES NFR BLD: 4.6 %
NEUTROPHILS # BLD AUTO: 8.4 K/UL
NEUTROPHILS NFR BLD: 90.9 %
NRBC BLD-RTO: 0 /100 WBC
PHOSPHATE SERPL-MCNC: 3.1 MG/DL
PLATELET # BLD AUTO: 165 K/UL
PMV BLD AUTO: 10.5 FL
POTASSIUM SERPL-SCNC: 4.1 MMOL/L
PROT SERPL-MCNC: 6.1 G/DL
RBC # BLD AUTO: 2.59 M/UL
SODIUM SERPL-SCNC: 135 MMOL/L
WBC # BLD AUTO: 9.23 K/UL

## 2019-02-08 PROCEDURE — 92526 ORAL FUNCTION THERAPY: CPT

## 2019-02-08 PROCEDURE — 83735 ASSAY OF MAGNESIUM: CPT

## 2019-02-08 PROCEDURE — 25000003 PHARM REV CODE 250: Performed by: STUDENT IN AN ORGANIZED HEALTH CARE EDUCATION/TRAINING PROGRAM

## 2019-02-08 PROCEDURE — 85025 COMPLETE CBC W/AUTO DIFF WBC: CPT

## 2019-02-08 PROCEDURE — 97535 SELF CARE MNGMENT TRAINING: CPT

## 2019-02-08 PROCEDURE — 82550 ASSAY OF CK (CPK): CPT

## 2019-02-08 PROCEDURE — 20600001 HC STEP DOWN PRIVATE ROOM

## 2019-02-08 PROCEDURE — 82085 ASSAY OF ALDOLASE: CPT

## 2019-02-08 PROCEDURE — 25000003 PHARM REV CODE 250: Performed by: INTERNAL MEDICINE

## 2019-02-08 PROCEDURE — 99233 SBSQ HOSP IP/OBS HIGH 50: CPT | Mod: ,,, | Performed by: INTERNAL MEDICINE

## 2019-02-08 PROCEDURE — 99252 PR INITIAL INPATIENT CONSULT,LEVL II: ICD-10-PCS | Mod: ,,, | Performed by: NURSE PRACTITIONER

## 2019-02-08 PROCEDURE — 99252 IP/OBS CONSLTJ NEW/EST SF 35: CPT | Mod: ,,, | Performed by: NURSE PRACTITIONER

## 2019-02-08 PROCEDURE — 84100 ASSAY OF PHOSPHORUS: CPT

## 2019-02-08 PROCEDURE — 63600175 PHARM REV CODE 636 W HCPCS: Performed by: INTERNAL MEDICINE

## 2019-02-08 PROCEDURE — 99231 SBSQ HOSP IP/OBS SF/LOW 25: CPT | Mod: ,,, | Performed by: INTERNAL MEDICINE

## 2019-02-08 PROCEDURE — 99233 PR SUBSEQUENT HOSPITAL CARE,LEVL III: ICD-10-PCS | Mod: ,,, | Performed by: INTERNAL MEDICINE

## 2019-02-08 PROCEDURE — 99231 PR SUBSEQUENT HOSPITAL CARE,LEVL I: ICD-10-PCS | Mod: ,,, | Performed by: INTERNAL MEDICINE

## 2019-02-08 PROCEDURE — 80053 COMPREHEN METABOLIC PANEL: CPT

## 2019-02-08 RX ORDER — FOLIC ACID 1 MG/1
1 TABLET ORAL DAILY
Status: DISCONTINUED | OUTPATIENT
Start: 2019-02-08 | End: 2019-02-13 | Stop reason: HOSPADM

## 2019-02-08 RX ORDER — ESCITALOPRAM OXALATE 5 MG/1
10 TABLET ORAL DAILY
Status: DISCONTINUED | OUTPATIENT
Start: 2019-02-08 | End: 2019-02-13 | Stop reason: HOSPADM

## 2019-02-08 RX ORDER — AMLODIPINE BESYLATE 10 MG/1
10 TABLET ORAL DAILY
Status: DISCONTINUED | OUTPATIENT
Start: 2019-02-08 | End: 2019-02-13 | Stop reason: HOSPADM

## 2019-02-08 RX ORDER — GUAIFENESIN 100 MG/5ML
200 SOLUTION ORAL EVERY 4 HOURS
Status: DISCONTINUED | OUTPATIENT
Start: 2019-02-08 | End: 2019-02-13 | Stop reason: HOSPADM

## 2019-02-08 RX ORDER — ENOXAPARIN SODIUM 100 MG/ML
40 INJECTION SUBCUTANEOUS EVERY 24 HOURS
Status: DISCONTINUED | OUTPATIENT
Start: 2019-02-08 | End: 2019-02-13 | Stop reason: HOSPADM

## 2019-02-08 RX ORDER — FAMOTIDINE 20 MG/1
20 TABLET, FILM COATED ORAL 2 TIMES DAILY
Status: DISCONTINUED | OUTPATIENT
Start: 2019-02-08 | End: 2019-02-13 | Stop reason: HOSPADM

## 2019-02-08 RX ADMIN — GUAIFENESIN 200 MG: 100 SOLUTION ORAL at 11:02

## 2019-02-08 RX ADMIN — METHYLPREDNISOLONE SODIUM SUCCINATE 24 MG: 125 INJECTION, POWDER, FOR SOLUTION INTRAMUSCULAR; INTRAVENOUS at 09:02

## 2019-02-08 RX ADMIN — GUAIFENESIN 200 MG: 100 SOLUTION ORAL at 09:02

## 2019-02-08 RX ADMIN — ESCITALOPRAM OXALATE 10 MG: 5 TABLET, FILM COATED ORAL at 11:02

## 2019-02-08 RX ADMIN — ENOXAPARIN SODIUM 40 MG: 100 INJECTION SUBCUTANEOUS at 04:02

## 2019-02-08 RX ADMIN — FOLIC ACID 1 MG: 1 TABLET ORAL at 11:02

## 2019-02-08 RX ADMIN — FAMOTIDINE 20 MG: 20 TABLET ORAL at 11:02

## 2019-02-08 RX ADMIN — GUAIFENESIN 200 MG: 100 SOLUTION ORAL at 05:02

## 2019-02-08 RX ADMIN — AMLODIPINE BESYLATE 10 MG: 10 TABLET ORAL at 11:02

## 2019-02-08 RX ADMIN — FAMOTIDINE 20 MG: 20 TABLET ORAL at 09:02

## 2019-02-08 RX ADMIN — ALPRAZOLAM 0.25 MG: 0.25 TABLET ORAL at 09:02

## 2019-02-08 NOTE — CONSULTS
Inpatient consult to Physical Medicine Rehab  Consult performed by: Larissa Lezama NP  Consult ordered by: Chau Mcguire MD  Reason for consult: assess rehab needs        Reviewed patient history and current admission.  Rehab team following.  Full consult to follow.    ELLA Randolph, FNP-C  Physical Medicine & Rehabilitation   02/08/2019  Spectralink: [phone]

## 2019-02-08 NOTE — SUBJECTIVE & OBJECTIVE
Past Medical History:   Diagnosis Date    Breast cancer 2017    left    Depression     Diverticulosis     Gastritis     Hypertension     Vitamin B12 deficiency 3/8/2018     Past Surgical History:   Procedure Laterality Date    BIOPSY-SENTINEL NODE Left 2017    Performed [facility] at [facility] OR    BREAST BIOPSY Left     BREAST RECONSTRUCTION Left 2017     SECTION      COLONOSCOPY  2011    repeat in 10 yrs.    D&C      EGD (ESOPHAGOGASTRODUODENOSCOPY) N/A 2019    [facility] at [facility] (2ND FLR)    PGPFUXHHC-ZZFA-P-CATH Right 10/31/2018    Performed by [facility] Palencia [facility] at [facility] OR 2ND FLR    HXXTKYKXM-BIAG-L-CATH Right 2017    [facility] at [facility] OR    QDLIEKKCA-EUSK-F-CATH-neck or chest Fluoro needed Consent AM of surgery Right 10/30/2018    Performed by [facility] at [facility] OR 2ND FLR    MASTECTOMY Left 2017    MASTECTOMY Left 2017    Performed by [facility] at [facility] OR    MYOMECTOMY      PORTACATH PLACEMENT      RECONSTRUCTION-BREAST/FLAP-SCOTT Left 2017    Performed by [facility]jinder Sewell [facility] at [facility] OR    SENTINEL LYMPH NODE BIOPSY  2017    left    TOTAL REDUCTION MAMMOPLASTY Right 2017     Review of patient's allergies indicates:  No Known Allergies    Scheduled Medications:    amLODIPine  10 mg Per G Tube Daily    escitalopram oxalate  10 mg Per G Tube Daily    famotidine  20 mg Per G Tube BID    folic acid  1 mg Per G Tube Daily    guaifenesin 100 mg/5 ml  200 mg Per G Tube Q4H    methylPREDNISolone sodium succinate  24 mg Intravenous Q12H       PRN Medications: acetaminophen, ALPRAZolam, dextrose 50%, dextrose 50%, glucagon (human recombinant), glucose, glucose, hepatitis B, hydrALAZINE, insulin aspart U-100, morphine, ondansetron, pneumoc 13-brandin conj-dip cr(PF), polyethylene glycol, promethazine, sodium chloride, sodium chloride 0.9%, sodium chloride 0.9%    Family  History     Problem Relation (Age of Onset)    Breast cancer Sister (52)    Heart disease Father    Hypertension Father, Mother    No Known Problems Brother, Son, Brother, Brother, Sister, Daughter    Thyroid disease Daughter        Tobacco Use    Smoking status: Never Smoker    Smokeless tobacco: Never Used   Substance and Sexual Activity    Alcohol use: Yes     Comment: wine    Drug use: No    Sexual activity: Yes     Partners: Male     Birth control/protection: Post-menopausal     Review of Systems   Constitutional: Positive for activity change. Negative for fatigue and fever.   HENT: Negative for trouble swallowing and voice change.    Eyes: Negative for photophobia and visual disturbance.   Respiratory: Negative for cough and shortness of breath.    Cardiovascular: Negative for chest pain and palpitations.   Gastrointestinal: Negative for abdominal distention, nausea and vomiting.   Genitourinary: Negative for difficulty urinating and flank pain.   Musculoskeletal: Positive for gait problem and myalgias.   Skin: Positive for rash (improving). Negative for color change.   Neurological: Positive for weakness. Negative for numbness.   Psychiatric/Behavioral: Negative for agitation and confusion.     Objective:     Vital Signs (Most Recent):  Temp: 98 °F (36.7 °C) (02/08/19 0818)  Pulse: 86 (02/08/19 0818)  Resp: 18 (02/08/19 0818)  BP: 118/63 (02/08/19 0818)  SpO2: 99 % (02/08/19 0818)    Vital Signs (24h Range):  Temp:  [97.8 °F (36.6 °C)-99.3 °F (37.4 °C)] 98 °F (36.7 °C)  Pulse:  [] 86  Resp:  [16-26] 18  SpO2:  [98 %-100 %] 99 %  BP: (113-135)/(60-79) 118/63     Body mass index is 27.07 kg/m².    Physical Exam   Constitutional: She is oriented to person, place, and time. She appears well-developed and well-nourished.   HENT:   Head: Normocephalic and atraumatic.   Eyes: Right eye exhibits no discharge. Left eye exhibits no discharge.   Neck: Neck supple.   Cardiovascular: Intact distal pulses.    Pulmonary/Chest: Effort normal. No respiratory distress.   Abdominal: Soft. There is no tenderness.   Musculoskeletal: She exhibits no deformity.   RUE: 3/5.  LUE: 3/5.  RLE: 3/5.  LLE: 4/5-all proximal strength   BUE swelling   Neurological: She is alert and oriented to person, place, and time. No sensory deficit.   Skin: Skin is warm and dry.   Psychiatric: She has a normal mood and affect. Her behavior is normal.   Vitals reviewed.    NEUROLOGICAL EXAMINATION:     MENTAL STATUS   Oriented to person, place, and time.       Diagnostic Results:   Labs: Reviewed  ECG: Reviewed  X-Ray: Reviewed  US: Reviewed  CT: Reviewed  MRI: Reviewed

## 2019-02-08 NOTE — PROGRESS NOTES
Contacted [facility] Rehab Liaison/Tomas and entered PPM&R referral via [facility].

## 2019-02-08 NOTE — HPI
"Ermelinda Verde is a 58-year-old female with PMHx of L breast CA (s/p chemo and left mastectomy 06/2017), HTN, depression, vit B12 def, gastritis, & diverticulosis . Patient presented to [facility] on 1/22 with proximal muscle weakness of shoulder with mild tenderness.  MRI L humerus revealed diffuse edema without abscess or osteomyelitis. Rheum and Dermatology consulted. Dermatitis noted on exam with skin bx consistent with dermatomyositis on 1/24. S/p IVIG x 5 days with improvement in strength. Will need IVIg again on 2/28/19. Solumedrol to 24 mg IV q 12h. Once swallowing re-evaluated can change to Medrol 48 mg oral daily. MTX to 20 mg sc weekly (Tuesdays) with daily folic acid. Hospital course further complicated by dysphagia and oral thrush (GI consulted. EGD with bx for erosive esophagitis that are pending. S/p fluconazole and nystatin. PEG placed 2/7), & swelling of BUE (negative for DVT), and anemia. On D5W gtt at this time. Per Rheum, "paclitaxel agent and atezolizumb are held at this time - both can cause myositis."     Functional History: Patient lives in [place] with spouse and 3 grandchildren in a single story home with no steps to enter.  Prior to admission, some (A) with ADLs and  (I) with mobility. DME: none.     "

## 2019-02-08 NOTE — PLAN OF CARE
Problem: Adult Inpatient Plan of Care  Goal: Plan of Care Review  Outcome: Ongoing (interventions implemented as appropriate)  Side rails up x2; call bell in place; bed in lowest, locked position; skid proof socks on; no evidence of skin breakdown; care plan explained to patient; pt remains free of injury. NPO, voids, ambulates, IVF infusing, pt denies pain or n/v . PEG placed, tube taped and clamped. Pt with c/o abdomen throbbing Med onc Dr Zheng notified at 1715, MD stated she would assess pt. VSS and afebrile.

## 2019-02-08 NOTE — ASSESSMENT & PLAN NOTE
"-s/p L breast mastectomy and s/p chemo 2017  -Per rheumatology, "hold paclitaxel agent and atezolizumb at this time - both can cause myositis."  "

## 2019-02-08 NOTE — CONSULTS
"[facility]  Physical Medicine & Rehab  Consult Note    Patient Name: Ermelinda Verde  MRN: [id number]  Admission Date: 1/22/2019  Hospital Length of Stay: 17 days  Attending Physician: Conrad Cheung MD     Inpatient consult to Physical Medicine & Rehabilitation  Consult performed by: Larissa Lezama NP  Consult requested by:  Conrad Cheung MD    Reason for Consult:  assess rehabilitation needs  Consults  Subjective:     Principal Problem: Myositis    HPI: Ermelinda Verde is a 58-year-old female with PMHx of L breast CA (s/p chemo and left mastectomy 06/2017), HTN, depression, vit B12 def, gastritis, & diverticulosis . Patient presented to [facility] on 1/22 with proximal muscle weakness of shoulder with mild tenderness.  MRI L humerus revealed diffuse edema without abscess or osteomyelitis. Rheum and Dermatology consulted. Dermatitis noted on exam with skin bx consistent with dermatomyositis on 1/24. S/p IVIG x 5 days with improvement in strength. Will need IVIg again on 2/28/19. Solumedrol to 24 mg IV q 12h. Once swallowing re-evaluated can change to Medrol 48 mg oral daily. MTX to 20 mg sc weekly (Tuesdays) with daily folic acid. Hospital course further complicated by dysphagia and oral thrush (GI consulted. EGD with bx for erosive esophagitis that are pending. S/p fluconazole and nystatin. PEG placed 2/7), & swelling of BUE (negative for DVT), and anemia. On D5W gtt at this time. Per Rheum, "paclitaxel agent and atezolizumb are held at this time - both can cause myositis."     Functional History: Patient lives in [place] with spouse and 3 grandchildren in a single story home with no steps to enter.  Prior to admission, some (A) with ADLs and  (I) with mobility. DME: none.     Hospital Course:   01/31/2019: Participated with OT.  Sit to stand and transfers SV.  Ambulated 25 ft SV with 1 minor LOB with self-corrected.  Grooming SV.  02/07/2019: Participated with therapy.  Sit to stand (I) and transfers " SBA & RW.  Ambulated 28 FT sba & rw.    Past Medical History:   Diagnosis Date    Breast cancer 2017    left    Depression     Diverticulosis     Gastritis     Hypertension     Vitamin B12 deficiency 3/8/2018     Past Surgical History:   Procedure Laterality Date    BIOPSY-SENTINEL NODE Left 2017    [facility] by [facility] at [facility] OR    BREAST BIOPSY Left     BREAST RECONSTRUCTION Left 2017     SECTION      COLONOSCOPY  2011    repeat in 10 yrs.    D&C      EGD (ESOPHAGOGASTRODUODENOSCOPY) N/A 2019    Performed by [facility] at [facility] ENDO (2ND FLR)    AKHRNAZAW-CFRV-X-CATH Right 10/31/2018    Performed by [facility] at [facility] OR 2ND FLR    WBOASDYKX-TRXB-I-CATH Right 2017    [facility] at [facility] OR    EMXGBYBDK-FDUO-W-CATH-neck or chest Fluoro needed Consent AM of surgery Right 10/30/2018    Performed by [facility] Plaencia [facility] at [facility] OR 2ND FLR    MASTECTOMY Left 2017    MASTECTOMY Left 2017    [facility] at [facility] OR    MYOMECTOMY      PORTACATH PLACEMENT      RECONSTRUCTION-BREAST/FLAP-SCOTT Left 2017    Performed by [facility]ell [facility] at [facility] OR    SENTINEL LYMPH NODE BIOPSY  2017    left    TOTAL REDUCTION MAMMOPLASTY Right 2017     Review of patient's allergies indicates:  No Known Allergies    Scheduled Medications:    amLODIPine  10 mg Per G Tube Daily    escitalopram oxalate  10 mg Per G Tube Daily    famotidine  20 mg Per G Tube BID    folic acid  1 mg Per G Tube Daily    guaifenesin 100 mg/5 ml  200 mg Per G Tube Q4H    methylPREDNISolone sodium succinate  24 mg Intravenous Q12H       PRN Medications: acetaminophen, ALPRAZolam, dextrose 50%, dextrose 50%, glucagon (human recombinant), glucose, glucose, hepatitis B, hydrALAZINE, insulin aspart U-100, morphine, ondansetron, pneumoc 13-brandin conj-dip cr(PF), polyethylene glycol, promethazine, sodium chloride, sodium chloride  0.9%, sodium chloride 0.9%    Family History     Problem Relation (Age of Onset)    Breast cancer Sister (52)    Heart disease Father    Hypertension Father, Mother    No Known Problems Brother, Son, Brother, Brother, Sister, Daughter    Thyroid disease Daughter        Tobacco Use    Smoking status: Never Smoker    Smokeless tobacco: Never Used   Substance and Sexual Activity    Alcohol use: Yes     Comment: wine    Drug use: No    Sexual activity: Yes     Partners: Male     Birth control/protection: Post-menopausal     Review of Systems   Constitutional: Positive for activity change. Negative for fatigue and fever.   HENT: Negative for trouble swallowing and voice change.    Eyes: Negative for photophobia and visual disturbance.   Respiratory: Negative for cough and shortness of breath.    Cardiovascular: Negative for chest pain and palpitations.   Gastrointestinal: Negative for abdominal distention, nausea and vomiting.   Genitourinary: Negative for difficulty urinating and flank pain.   Musculoskeletal: Positive for gait problem and myalgias.   Skin: Positive for rash (improving). Negative for color change.   Neurological: Positive for weakness. Negative for numbness.   Psychiatric/Behavioral: Negative for agitation and confusion.     Objective:     Vital Signs (Most Recent):  Temp: 98 °F (36.7 °C) (02/08/19 0818)  Pulse: 86 (02/08/19 0818)  Resp: 18 (02/08/19 0818)  BP: 118/63 (02/08/19 0818)  SpO2: 99 % (02/08/19 0818)    Vital Signs (24h Range):  Temp:  [97.8 °F (36.6 °C)-99.3 °F (37.4 °C)] 98 °F (36.7 °C)  Pulse:  [] 86  Resp:  [16-26] 18  SpO2:  [98 %-100 %] 99 %  BP: (113-135)/(60-79) 118/63     Body mass index is 27.07 kg/m².    Physical Exam   Constitutional: She is oriented to person, place, and time. She appears well-developed and well-nourished.   HENT:   Head: Normocephalic and atraumatic.   Eyes: Right eye exhibits no discharge. Left eye exhibits no discharge.   Neck: Neck supple.    Cardiovascular: Intact distal pulses.   Pulmonary/Chest: Effort normal. No respiratory distress.   Abdominal: Soft. There is no tenderness.   Musculoskeletal: She exhibits no deformity.   RUE: 3/5.  LUE: 3/5.  RLE: 3/5.  LLE: 4/5-all proximal strength   BUE swelling   Neurological: She is alert and oriented to person, place, and time. No sensory deficit.   Skin: Skin is warm and dry.   Psychiatric: She has a normal mood telly affect. Her behavior is normal.   Vitals reviewed.      Diagnostic Results:   Labs: Reviewed  ECG: Reviewed  X-Ray: Reviewed  US: Reviewed  CT: Reviewed  MRI: Reviewed    Assessment/Plan:     * Myositis    -rheumatology and Dermatology consulted  -proximal muscle weakness of shoulder with mild tenderness.  -MRI L humerus revealed diffuse edema without abscess or osteomyelitis  -Dermatitis noted on exam with skin bx consistent with dermatomyositis on 1/24  -Per Rheum:   -S/p IVIG x 5 days with improvement in strength. Will need IVIg again on 2/28/19.   - Solumedrol to 24 mg IV q 12h. Once swallowing re-evaluated can change to Medrol 48 mg oral daily   -MTX to 20 mg sc weekly (Tuesdays) with daily folic acid.     Impaired functional mobility and endurance    -2/2 myositis     Recommendations  -  Encourage mobility, OOB in chair, and early ambulation as appropriate  -  PT/OT evaluate and treat  -  Pain management  -  DVT prophylaxis  -  Monitor for and prevent skin breakdown and pressure ulcers  · Early mobility, repositioning/weight shifting every 20-30 minutes when sitting, turn patient every 2 hours, proper mattress/overlay and chair cushioning, pressure relief/heel protector boots      Dermatomyositis    -see myositis      Dysphagia  Candidiasis    -GI consulted-> EGD with bx for erosive esophagitis that are pending.   -S/p fluconazole and nystatin trx   s/p PEG placement 2/7     Swelling of upper arm    -BUE US negative for DVT         Adjustment disorder with depressed mood    -on SSRI    "  Breast cancer of upper-outer quadrant of left female breast    -s/p L breast mastectomy and s/p chemo 2017  -Per rheumatology, "hold paclitaxel agent and atezolizumb at this time - both can cause myositis."     Participating with therapy. Likely a good rehab candidate, but not medically stable. Will follow up on Monday and discuss with rehab team for post acute care/rehab recommendation.      Thank you for your consult.     Larissa Lezama NP  Department of Physical Medicine & Rehab  [facility]    "

## 2019-02-08 NOTE — PLAN OF CARE
Problem: SLP Goal  Goal: SLP Goal  Speech-Language Pathology   Goals expected to be met by 2/20  1. Pt will complete dysphagia exercises x10 each with good ability to strengthen the swallowing musculature.   2. When deemed appropriate by SLP, pt with participate in ongoing assessment of swallow to determine readiness for repeat MBSS.      Outcome: Ongoing (interventions implemented as appropriate)    Recommend ongoing NPO with cont'd PEG for all nutrition, hydration, medication. Slow progress toward SLP goals. SLP POC dec'd to 2x/ week.     IRENA Khan, CCC-SLP  [phone]  2/8/2019

## 2019-02-08 NOTE — PT/OT/SLP PROGRESS
"Speech Language Pathology Treatment    Patient Name:  Ermelinda Verde   MRN:  [id number]   [id number]  Admitting Diagnosis: Myositis    Recommendations:                 General Recommendations:  Dysphagia therapy  Diet recommendations:  NPO, Liquid Diet Level: NPO   Aspiration Precautions: Strict aspiration precautions   General Precautions: Standard, aspiration, fall, NPO    Subjective     "It's gonna get better." Pt reported positive attitude re: swallowing recovery.     Pain/Comfort:  · Pain Rating 1: 0/10  · Pain Rating Post-Intervention 1: 0/10    Objective:     Has the patient been evaluated by SLP for swallowing?   Yes  Keep patient NPO? Yes   Current Respiratory Status: room air      Per review of pt's medical chart, pt s/p PEG placement 2/7/19.     Pt awake and alert upon entry, sitting fully upright in bedside chair.  present. Pt and  engaged in extensive conversation with clinician re: results of MBSS completed 2/6/19, definition/ risk of aspiration, swallowing anatomy and physiology, expectations for pt's ongoing swallowing rehabilitation, and ongoing SLP POC. Emotional support and encouragement provided. Per pt and , handout for independent completion of dysphagia exercises remains at bedside. Additional education provided re: independent completion of dysphagia exercises 5-10x/ each, 2-3x/ day. Pt and  verbalized understanding of all education provided and agreement with SLP POC. White board current. No further questions.     Assessment:     Ermelinda Verde is a 58 y.o. female with an SLP diagnosis of severe dysphagia.     Goals:   Multidisciplinary Problems     SLP Goals        Problem: SLP Goal    Goal Priority Disciplines Outcome   SLP Goal     SLP Ongoing (interventions implemented as appropriate)   Description:  Speech-Language Pathology   Goals expected to be met by 2/20  1. Pt will complete dysphagia exercises x10 each with good ability to strengthen the swallowing " musculature.   2. When deemed appropriate by SLP, pt with participate in ongoing assessment of swallow to determine readiness for repeat MBSS.                     Plan:     · Patient to be seen:  2 x/week   · Plan of Care expires:  02/24/19  · Plan of Care reviewed with:  patient, spouse   · SLP Follow-Up:  Yes       Discharge recommendations:  rehabilitation facility     Time Tracking:     SLP Treatment Date:   02/01/19  Speech Start Time:  1117  Speech Stop Time:  1138     Speech Total Time (min):  21 min    Billable Minutes: Treatment Swallowing Dysfunction 10 and Seld Care/Home Management Training 11    IRENA Khan, CCC-SLP  [phone]  2/8/2019

## 2019-02-08 NOTE — ASSESSMENT & PLAN NOTE
Patient is experiencing difficulty swallowing that has been increasing in severity over last couple of days to the point where patient did not feel as if she could swallow.  As of 01/26, SLP evaluated patient and determined that she should be NPO except for medications until a modified barium swallow could be performed. Possibly candidal esophagitis; patient has oral thrush.    Plan:  - High aspiration risk on modified barium study, strict NPO  - NGT in situ, tube feeds initiated per dietary consult  - Switched to IV meds where possible  - Fluconazole IVPB 200mg q24hr (now d/c'd)  - GI consulted, EGD resulted w/ Grade C erosive esophagitis, no candidal infxn identified. Bx obtained, GI recs start PPI bid  - Failed swallow study after NGT removed  - PEG completed yetserday, 2/7  - Dietary/nutrition consult for tube feeds and education  - Medications per PEG tube  - Home health SLP therapy to work on swallowing improvement

## 2019-02-08 NOTE — ASSESSMENT & PLAN NOTE
-rheumatology and Dermatology consulted  -proximal muscle weakness of shoulder with mild tenderness.  -MRI L humerus revealed diffuse edema without abscess or osteomyelitis  -Dermatitis noted on exam with skin bx consistent with dermatomyositis on 1/24  -Per Rheum:   -S/p IVIG x 5 days with improvement in strength. Will need IVIg again on 2/28/19.   - Solumedrol to 24 mg IV q 12h. Once swallowing re-evaluated can change to Medrol 48 mg oral daily   -MTX to 20 mg sc weekly (Tuesdays) with daily folic acid.

## 2019-02-08 NOTE — SUBJECTIVE & OBJECTIVE
Interval History:     Oncology Treatment Plan:   OP BREAST DOCETAXEL Q3W    Medications:  Continuous Infusions:   dextrose 5 % and 0.9 % NaCl 100 mL/hr at 02/07/19 2318     Scheduled Meds:   amLODIPine  10 mg Per NG tube Daily    calcium-vitamin D3  1 tablet Oral BID    escitalopram oxalate  10 mg Per NG tube Daily    famotidine  20 mg Per NG tube BID    folic acid  1 mg Oral Daily    guaifenesin 100 mg/5 ml  200 mg Per NG tube Q4H    methylPREDNISolone sodium succinate  24 mg Intravenous Q12H     PRN Meds:acetaminophen, ALPRAZolam, dextrose 50%, dextrose 50%, glucagon (human recombinant), glucose, glucose, hepatitis B, hydrALAZINE, insulin aspart U-100, morphine, ondansetron, pneumoc 13-brandin conj-dip cr(PF), polyethylene glycol, promethazine, sodium chloride, sodium chloride 0.9%, sodium chloride 0.9%     Review of Systems   Constitutional: Positive for fatigue. Negative for activity change, appetite change, chills, diaphoresis, fever and unexpected weight change.   HENT: Positive for facial swelling and trouble swallowing. Negative for congestion, ear discharge, hearing loss, postnasal drip, sinus pressure, sneezing, sore throat and tinnitus.    Eyes: Negative for photophobia, pain and redness.   Respiratory: Negative for apnea, cough, choking, chest tightness, shortness of breath and stridor.    Cardiovascular: Negative for chest pain, palpitations and leg swelling.   Gastrointestinal: Negative for abdominal pain, anal bleeding, constipation, diarrhea, nausea and rectal pain.   Endocrine: Negative for polyuria.   Genitourinary: Negative for dysuria and hematuria.   Musculoskeletal: Negative for arthralgias, back pain, gait problem, joint swelling, myalgias, neck pain and neck stiffness.   Skin: Positive for rash. Negative for color change and pallor.   Allergic/Immunologic: Negative for immunocompromised state.   Neurological: Positive for weakness. Negative for dizziness, seizures and numbness.    Hematological: Positive for adenopathy. Does not bruise/bleed easily.   Psychiatric/Behavioral: Negative for agitation and behavioral problems.     Objective:     Vital Signs (Most Recent):  Temp: 98 °F (36.7 °C) (02/08/19 0405)  Pulse: 95 (02/08/19 0405)  Resp: 18 (02/08/19 0405)  BP: 118/65 (02/08/19 0405)  SpO2: 99 % (02/08/19 0533) Vital Signs (24h Range):  Temp:  [97.7 °F (36.5 °C)-99.3 °F (37.4 °C)] 98 °F (36.7 °C)  Pulse:  [] 95  Resp:  [16-26] 18  SpO2:  [98 %-100 %] 99 %  BP: (113-135)/(60-79) 118/65     Weight: 73.8 kg (162 lb 11.2 oz)  Body mass index is 27.07 kg/m².  Body surface area is 1.84 meters squared.      Intake/Output Summary (Last 24 hours) at 2/8/2019 0815  Last data filed at 2/8/2019 0600  Gross per 24 hour   Intake 1026.67 ml   Output 1700 ml   Net -673.33 ml       Physical Exam   Constitutional: She is oriented to person, place, and time. She appears well-developed and well-nourished. No distress.   HENT:   Head: Atraumatic.   Nose: Nose normal.   Mouth/Throat: No oropharyngeal exudate.   Eyes: Conjunctivae and EOM are normal. Pupils are equal, round, and reactive to light. Right eye exhibits no discharge. Left eye exhibits no discharge. No scleral icterus.   Neck: Normal range of motion. Neck supple. No tracheal deviation present.   Cardiovascular: Normal rate, regular rhythm and intact distal pulses. Exam reveals no gallop and no friction rub.   Pulmonary/Chest: Effort normal and breath sounds normal. No respiratory distress. She has no wheezes. She has no rales. She exhibits no tenderness.   Abdominal: Soft. Bowel sounds are normal. She exhibits no distension and no mass. There is no tenderness. There is no rebound and no guarding.   Musculoskeletal: Normal range of motion. She exhibits no tenderness or deformity.   Neurological: She is alert and oriented to person, place, and time. No cranial nerve deficit or sensory deficit.     4.5/5 shoulder strength on shoulder abduction;  improving  5/5 flexion and extension preserved at elbow   Skin: Skin is warm and dry. Capillary refill takes less than 2 seconds. No rash noted. She is not diaphoretic. No erythema. No pallor.   Psychiatric: She has a normal mood and affect.   Vitals reviewed.      Significant Labs:   BMP:   Recent Labs   Lab 02/07/19 0350 02/08/19 0344   GLU 96 123*   * 135*   K 4.4 4.1    103   CO2 28 26   BUN 27* 19   CREATININE 0.6 0.6   CALCIUM 9.0 8.5*   MG 2.1 2.0   , CBC:   Recent Labs   Lab 02/07/19 0350 02/08/19 0344   WBC 8.85 9.23   HGB 7.5* 7.3*   HCT 24.4* 23.4*    165   , CMP:   Recent Labs   Lab 02/07/19 0350 02/08/19 0344   * 135*   K 4.4 4.1    103   CO2 28 26   GLU 96 123*   BUN 27* 19   CREATININE 0.6 0.6   CALCIUM 9.0 8.5*   PROT 6.8 6.1   ALBUMIN 2.8* 2.5*   BILITOT 0.5 0.5   ALKPHOS 66 58   AST 87* 76*   ALT 35 36   ANIONGAP 6* 6*   EGFRNONAA >60.0 >60.0   , Coagulation: No results for input(s): PT, INR, APTT in the last 48 hours., Haptoglobin: No results for input(s): HAPTOGLOBIN in the last 48 hours., Immunology: No results for input(s): SPEP, ERVIN, DARCY, FREELAMBDALI in the last 48 hours., LDH: No results for input(s): LDHCSF, BFSOURCE in the last 48 hours., LFTs:   Recent Labs   Lab 02/07/19 0350 02/08/19 0344   ALT 35 36   AST 87* 76*   ALKPHOS 66 58   BILITOT 0.5 0.5   PROT 6.8 6.1   ALBUMIN 2.8* 2.5*   , Reticulocytes: No results for input(s): RETIC in the last 48 hours., Tumor Markers: No results for input(s): PSA, CEA, , AFPTM, US2049,  in the last 48 hours.    Invalid input(s): ALGTM, Uric Acid No results for input(s): URICACID in the last 48 hours., Urine Studies: No results for input(s): COLORU, APPEARANCEUA, PHUR, SPECGRAV, PROTEINUA, GLUCUA, KETONESU, BILIRUBINUA, OCCULTUA, NITRITE, UROBILINOGEN, LEUKOCYTESUR, RBCUA, WBCUA, BACTERIA, SQUAMEPITHEL, HYALINECASTS in the last 48 hours.    Invalid input(s): WRIGHTSUR and All pertinent labs from the last  24 hours have been reviewed.    Diagnostic Results:  EXAMINATION:  FL MODIFIED BARIUM SWALLOW SPEECH STUDY    CLINICAL HISTORY:  repeat to determine diet initiation.;    TECHNIQUE:  Modified Barium Swallow Study. Video fluoroscopic swallowing examination was performed in conjunction with the speech pathology department.  Various liquid and solid food substances were used to assess swallowing.    Fluoroscopy Time: 1.9 minutes    COMPARISON:  Fluoroscopic modified barium swallow 01/28/2019      Impression       Transit: Delayed oral transit time. Significant vallecular and pyriform recess residual/layering.    Penetration/Aspiration: Aspiration following the consumption of liquids.  Significant stasis of puree food with high risk of aspiration.    Other: N/A.    See speech pathology report for further details.    Electronically signed by resident: Latrell Garcia MD  Date: 02/06/2019  Time: 15:43    Electronically signed by: Shay Reddy MD  Date: 02/06/2019

## 2019-02-08 NOTE — ASSESSMENT & PLAN NOTE
-On January 24, 2017 a core needle biopsy was performed which showed infiltrating ductal carcinoma, high grade.  The tumor was ER negative, CO negative, and HER-2 negative.  -She had 4 cycles of  Wilbur-adjuvantTaxotere and Cytoxan completed  on 5/9/17.  -On June 26 she underwent left mastectomy.  That revealed 2 foci of invasive high-grade carcinoma measuring 14 mm and 1.5 mm.  Margins were negative.  -She completed 4 cycles of adjuvant Adriamycin on September 12, 2017.  -  A fine-needle aspirate of the lymph node was performed on October 19th.  That showed metastatic carcinoma consistent with breast primary which was ER negative, CO negative and HER 2-negative.    -Per rheumatology: hold paclitaxel agent and atezolizumb at this time - both can cause myositis

## 2019-02-08 NOTE — PLAN OF CARE
Problem: Adult Inpatient Plan of Care  Goal: Plan of Care Review  Outcome: Ongoing (interventions implemented as appropriate)  No acute events overnight. Ambulates to BR to void. Side rails up x2; call light in reach; bed locked in lowest position, non skid socks on; no evidence of skin breakdown; POC discussed w patient; questions answered. Remains free of injury. NPO. IVF infusing, pt denies pain or n/v . PEG in place, tube taped and clamped.  VSS and afebrile. Will CTM.

## 2019-02-08 NOTE — PROGRESS NOTES
[facility]  Rheumatology  Progress Note    Patient Name: Ermelinda Verde  MRN: [id number]  Admission Date: 1/22/2019  Hospital Length of Stay: 17 days  Code Status: Full Code   Attending Provider: Ashwin Stewart MD  Primary Care Physician: Reta Weeks MD  Principal Problem: Myositis    Subjective:     HPI: 57yo F with history of L sided infiltrating ductal carcinoma of the L breast (dx on Jan 2017), HTN, depression, and gastritis was send from hem/onc clinic for evaluation of possible inflammatory myositis.     L breast cancer s/p completion of 4 cycles of demi-adjuvant taxotere and cytoxan (completed on 5/9/17) and mastectomy (6/26/17) and then 4 cycles of adjuvant Adriamycin (completed 9/12/17). In 10/2017 - patient developed L supraclavicular lymphadenopathy which FNA confirmed as reoccurrence of previously treated breast cancer.     Patient started on Atelizumab/Abraxane on 11/7/18. Per chart review, patient noticed rashes on the dorsum of her hands on 11/11/18. Seen in urgent care and given topical steroid cream. Then received two more infusions on Atelizumab/Abraxane on 11/21/18 and 12/5/18. Started to notice puffiness around the eyes on 12/11/18. Received another Abraxane infusion on 12/12/18 but this time with hydrocortisone 50 mg which helped reduce the swelling around the eyes. Developed swelling of the face again on 12/15/18. Next infusion of Abraxane done on 12/19/18 with Solucortef and Atelizumab held. Abraxane given again on 1/3/19 with no IV steroid. Patient developed swelling of the face on 1/4/19 and went to urgent care and given short course of prednisone 20 mg BID. On 1/15/19, patient seen in hem/onc clinic with c/o of pressure and tightness around neck. CT scan of chest and neck did not reveal any vascular compression. Started on prednisone 60 mg with taper. On 1/22/18 patient with c/o proximal muscle weakness. CPK found to be elevated around 4k. Patient admitted and given  solumedrol 80 mg IV on 1/22/18. Last infusion of Atelizumab on 12/19/18. Last infusion of Abraxane on 1/3/19. Given Solumedrol 1g x 1 on 1/23/18. Seen by Dermatology on 1/24/18 and biopsy done of skin rash. Given distribution of rashes, there was concern for dermatomyositis. Patient started on Solumedrol 125 mg IV BID at this time.     Denies any family history of autoimmune diseases.    No smoking, EtOH, recreational drug usage.    Denies any photosensitivity, joint swelling, unintentional weigh loss, abdominal pain, night sweats, CP, SOB.  +oral thrush.     Interval History: pt had PEG tube placed 2/7.  Pt stated she stills feels weak, about the same as yesterday but overall has noticed an improvement.  She denied new rashes, no joint pain or swelling.     Current Facility-Administered Medications   Medication Frequency    acetaminophen tablet 650 mg Q4H PRN    ALPRAZolam tablet 0.25 mg TID PRN    amLODIPine tablet 10 mg Daily    dextrose 5 % and 0.9 % NaCl infusion Continuous    dextrose 50% injection 12.5 g PRN    dextrose 50% injection 25 g PRN    escitalopram oxalate tablet 10 mg Daily    famotidine tablet 20 mg BID    folic acid tablet 1 mg Daily    glucagon (human recombinant) injection 1 mg PRN    glucose chewable tablet 16 g PRN    glucose chewable tablet 24 g PRN    guaifenesin 100 mg/5 ml syrup 200 mg Q4H    hepatitis B (HEPLISAV-B) 20 mcg/0.5 mL vaccine 0.5 mL vaccine x 1 dose    hydrALAZINE injection 10 mg Q6H PRN    insulin aspart U-100 pen 0-5 Units QID (AC + HS) PRN    methylPREDNISolone sodium succinate injection 24 mg Q12H    morphine injection 2 mg Q6H PRN    ondansetron disintegrating tablet 8 mg Q8H PRN    pneumoc 13-brandin conj-dip cr(PF) (PREVNAR 13 (PF)) 0.5 mL vaccine x 1 dose    polyethylene glycol packet 17 g BID PRN    promethazine tablet 12.5 mg Q6H PRN    sodium chloride 0.65 % nasal spray 1 spray PRN    sodium chloride 0.9% flush 10 mL PRN    sodium chloride 0.9%  flush 5 mL PRN     Objective:     Vital Signs (Most Recent):  Temp: 98 °F (36.7 °C) (02/08/19 0818)  Pulse: 86 (02/08/19 0818)  Resp: 18 (02/08/19 0818)  BP: 118/63 (02/08/19 0818)  SpO2: 99 % (02/08/19 0818)  O2 Device (Oxygen Therapy): room air (02/08/19 0818) Vital Signs (24h Range):  Temp:  [97.8 °F (36.6 °C)-99.3 °F (37.4 °C)] 98 °F (36.7 °C)  Pulse:  [] 86  Resp:  [16-26] 18  SpO2:  [98 %-100 %] 99 %  BP: (113-135)/(60-79) 118/63     Weight: 73.8 kg (162 lb 11.2 oz) (02/08/19 0400)  Body mass index is 27.07 kg/m².  Body surface area is 1.84 meters squared.      Intake/Output Summary (Last 24 hours) at 2/8/2019 1048  Last data filed at 2/8/2019 0600  Gross per 24 hour   Intake 1026.67 ml   Output 1700 ml   Net -673.33 ml       Physical Exam   Constitutional: She is oriented to person, place, and time and well-developed, well-nourished, and in no distress. No distress.   HENT:   Head: Normocephalic and atraumatic.   Right Ear: External ear normal.   Left Ear: External ear normal.   Eyes: Conjunctivae and EOM are normal. Pupils are equal, round, and reactive to light.   Neck: Normal range of motion. Neck supple.   Cardiovascular: Normal rate, regular rhythm, normal heart sounds and intact distal pulses.    Pulmonary/Chest: Effort normal and breath sounds normal. No respiratory distress.   Abdominal: Soft. Bowel sounds are normal.       Right Side Rheumatological Exam     Muscle Strength (0-5 scale):  Neck Flexion:  4.6  Neck Extension: 5  Deltoid:  4  Biceps: 5/5   Triceps:  4.4  : 5/5   Iliopsoas: 4  Quadriceps:  5   Distal Lower Extremity: 5    Left Side Rheumatological Exam     Muscle Strength (0-5 scale):  Neck Flexion:  4.6  Neck Extension: 5  Deltoid:  4  Biceps: 5/5   Triceps:  4.4  :  5/5   Iliopsoas: 4  Quadriceps:  5   Distal Lower Extremity: 5      Neurological: She is alert and oriented to person, place, and time.   Skin: Skin is warm and dry. No rash noted.     Hyperpigmented non raised  rash over nape of neck, elbows, knuckles (resemble Gottron's papules), thighs, and ankles -improving       Psychiatric: Mood, memory, affect and judgment normal.   Musculoskeletal: Normal range of motion. She exhibits no edema, tenderness or deformity.         Significant Labs:  Recent Results (from the past 48 hour(s))   Comprehensive Metabolic Panel (CMP)    Collection Time: 02/07/19  3:50 AM   Result Value Ref Range    Sodium 134 (L) 136 - 145 mmol/L    Potassium 4.4 3.5 - 5.1 mmol/L    Chloride 100 95 - 110 mmol/L    CO2 28 23 - 29 mmol/L    Glucose 96 70 - 110 mg/dL    BUN, Bld 27 (H) 6 - 20 mg/dL    Creatinine 0.6 0.5 - 1.4 mg/dL    Calcium 9.0 8.7 - 10.5 mg/dL    Total Protein 6.8 6.0 - 8.4 g/dL    Albumin 2.8 (L) 3.5 - 5.2 g/dL    Total Bilirubin 0.5 0.1 - 1.0 mg/dL    Alkaline Phosphatase 66 55 - 135 U/L    AST 87 (H) 10 - 40 U/L    ALT 35 10 - 44 U/L    Anion Gap 6 (L) 8 - 16 mmol/L    eGFR if African American >60.0 >60 mL/min/1.73 m^2    eGFR if non African American >60.0 >60 mL/min/1.73 m^2   Magnesium    Collection Time: 02/07/19  3:50 AM   Result Value Ref Range    Magnesium 2.1 1.6 - 2.6 mg/dL   Phosphorus    Collection Time: 02/07/19  3:50 AM   Result Value Ref Range    Phosphorus 4.1 2.7 - 4.5 mg/dL   CK    Collection Time: 02/07/19  3:50 AM   Result Value Ref Range     (H) 20 - 180 U/L   CBC auto differential    Collection Time: 02/07/19  3:50 AM   Result Value Ref Range    WBC 8.85 3.90 - 12.70 K/uL    RBC 2.80 (L) 4.00 - 5.40 M/uL    Hemoglobin 7.5 (L) 12.0 - 16.0 g/dL    Hematocrit 24.4 (L) 37.0 - 48.5 %    MCV 87 82 - 98 fL    MCH 26.8 (L) 27.0 - 31.0 pg    MCHC 30.7 (L) 32.0 - 36.0 g/dL    RDW 19.6 (H) 11.5 - 14.5 %    Platelets 179 150 - 350 K/uL    MPV 10.8 9.2 - 12.9 fL    Immature Granulocytes 0.7 (H) 0.0 - 0.5 %    Gran # (ANC) 7.8 (H) 1.8 - 7.7 K/uL    Immature Grans (Abs) 0.06 (H) 0.00 - 0.04 K/uL    Lymph # 0.5 (L) 1.0 - 4.8 K/uL    Mono # 0.5 0.3 - 1.0 K/uL    Eos # 0.0 0.0 -  0.5 K/uL    Baso # 0.00 0.00 - 0.20 K/uL    nRBC 0 0 /100 WBC    Gran% 87.5 (H) 38.0 - 73.0 %    Lymph% 5.9 (L) 18.0 - 48.0 %    Mono% 5.9 4.0 - 15.0 %    Eosinophil% 0.0 0.0 - 8.0 %    Basophil% 0.0 0.0 - 1.9 %    Differential Method Automated    Aldolase    Collection Time: 02/07/19  3:50 AM   Result Value Ref Range    Aldolase 4.6 1.2 - 7.6 U/L   Comprehensive Metabolic Panel (CMP)    Collection Time: 02/08/19  3:44 AM   Result Value Ref Range    Sodium 135 (L) 136 - 145 mmol/L    Potassium 4.1 3.5 - 5.1 mmol/L    Chloride 103 95 - 110 mmol/L    CO2 26 23 - 29 mmol/L    Glucose 123 (H) 70 - 110 mg/dL    BUN, Bld 19 6 - 20 mg/dL    Creatinine 0.6 0.5 - 1.4 mg/dL    Calcium 8.5 (L) 8.7 - 10.5 mg/dL    Total Protein 6.1 6.0 - 8.4 g/dL    Albumin 2.5 (L) 3.5 - 5.2 g/dL    Total Bilirubin 0.5 0.1 - 1.0 mg/dL    Alkaline Phosphatase 58 55 - 135 U/L    AST 76 (H) 10 - 40 U/L    ALT 36 10 - 44 U/L    Anion Gap 6 (L) 8 - 16 mmol/L    eGFR if African American >60.0 >60 mL/min/1.73 m^2    eGFR if non African American >60.0 >60 mL/min/1.73 m^2   Magnesium    Collection Time: 02/08/19  3:44 AM   Result Value Ref Range    Magnesium 2.0 1.6 - 2.6 mg/dL   Phosphorus    Collection Time: 02/08/19  3:44 AM   Result Value Ref Range    Phosphorus 3.1 2.7 - 4.5 mg/dL   CK    Collection Time: 02/08/19  3:44 AM   Result Value Ref Range     (H) 20 - 180 U/L   CBC auto differential    Collection Time: 02/08/19  3:44 AM   Result Value Ref Range    WBC 9.23 3.90 - 12.70 K/uL    RBC 2.59 (L) 4.00 - 5.40 M/uL    Hemoglobin 7.3 (L) 12.0 - 16.0 g/dL    Hematocrit 23.4 (L) 37.0 - 48.5 %    MCV 90 82 - 98 fL    MCH 28.2 27.0 - 31.0 pg    MCHC 31.2 (L) 32.0 - 36.0 g/dL    RDW 19.9 (H) 11.5 - 14.5 %    Platelets 165 150 - 350 K/uL    MPV 10.5 9.2 - 12.9 fL    Immature Granulocytes 0.4 0.0 - 0.5 %    Gran # (ANC) 8.4 (H) 1.8 - 7.7 K/uL    Immature Grans (Abs) 0.04 0.00 - 0.04 K/uL    Lymph # 0.4 (L) 1.0 - 4.8 K/uL    Mono # 0.4 0.3 - 1.0 K/uL     Eos # 0.0 0.0 - 0.5 K/uL    Baso # 0.00 0.00 - 0.20 K/uL    nRBC 0 0 /100 WBC    Gran% 90.9 (H) 38.0 - 73.0 %    Lymph% 4.1 (L) 18.0 - 48.0 %    Mono% 4.6 4.0 - 15.0 %    Eosinophil% 0.0 0.0 - 8.0 %    Basophil% 0.0 0.0 - 1.9 %    Differential Method Automated    Aldolase    Collection Time: 02/08/19  3:44 AM   Result Value Ref Range    Aldolase 5.4 1.2 - 7.6 U/L      Ref. Range 1/31/2019 04:00 2/1/2019 04:12 2/2/2019 04:44 2/3/2019 03:29 2/4/2019 04:06 2/5/2019 04:55 2/6/2019 04:09 2/7/2019 03:50   CPK Latest Ref Range: 20 - 180 U/L 1147 (H) 827 (H) 662 (H) 517 (H) 465 (H) 434 (H) 448 (H) 357 (H)          Ref. Range 1/24/2019 03:40 1/25/2019 04:20 1/26/2019 04:00 1/27/2019 04:21 1/28/2019 04:00   Aldolase Latest Ref Range: 1.2 - 7.6 U/L 12.7 (H) 14.2 (H) 13.7 (H) 13.1 (H) 15.4 (H)      Ref. Range 1/22/2019 22:24   Sed Rate Latest Ref Range: 0 - 36 mm/Hr 50 (H)        Ref. Range 1/22/2019 22:24   CRP Latest Ref Range: 0.0 - 8.2 mg/L 31.1 (H)      Quant TB 1/30/19: indeterminate       Ref. Range 1/22/2019 22:24 1/25/2019 17:33   DARCY HEP-2 Titer Unknown Positive 1:640 Sp...    Anti-SSA Antibody Latest Ref Range: 0.00 - 19.99 EU 0.49    Anti-SSA Interpretation Latest Ref Range: Negative  Negative    Anti-SSB Antibody Latest Ref Range: 0.00 - 19.99 EU 0.11    Anti-SSB Interpretation Latest Ref Range: Negative  Negative    ds DNA Ab Latest Ref Range: Negative 1:10  Negative 1:10    Anti Sm Antibody Latest Ref Range: 0.00 - 19.99 EU 0.58    Anti-Sm Interpretation Latest Ref Range: Negative  Negative    Anti Sm/RNP Antibody Latest Ref Range: 0.00 - 19.99 EU 0.62    Anti-Sm/RNP Interpretation Latest Ref Range: Negative  Negative    DARCY Screen Latest Ref Range: Negative <1:160  Positive (A)    Complement (C-3) Latest Ref Range: 50 - 180 mg/dL  106   Complement (C-4) Latest Ref Range: 11 - 44 mg/dL  29   Meg-1 Autoantibodies Latest Ref Range: <1.0 Index <1.00       Ref. Range 1/23/2019 02:55 1/23/2019 02:56   Specimen UA  Unknown Urine, Clean Catch    Color, UA Latest Ref Range: Yellow, Straw, [place]  Colorless (A)    pH, UA Latest Ref Range: 5.0 - 8.0  6.0    Specific Gravity, UA Latest Ref Range: 1.005 - 1.030  1.005    Appearance, UA Latest Ref Range: Clear  Clear    Protein, UA Latest Ref Range: Negative  Negative    Glucose, UA Latest Ref Range: Negative  Negative    Ketones, UA Latest Ref Range: Negative  Negative    Occult Blood UA Latest Ref Range: Negative  Negative    Nitrite, UA Latest Ref Range: Negative  Negative    Bilirubin (UA) Latest Ref Range: Negative  Negative    Leukocytes, UA Latest Ref Range: Negative  Trace (A)    RBC, UA Latest Ref Range: 0 - 4 /hpf  2   WBC, UA Latest Ref Range: 0 - 5 /hpf  3   Squam Epithel, UA Latest Units: /hpf  0   Microscopic Comment Unknown  SEE COMMENT     Meg-1: Negative    Significant Imaging:  MRI Humerus w/ and w/o contrast:  Impression       1. Diffuse edema and enhancement of the visualized left upper extremity musculature, most pronounced proximally, most notably of the deltoid and biceps musculature as detailed above.  Findings are nonspecific although could relate to a nonspecific myelitis particularly in light of patient's reported history.  Clinical correlation with appropriate history and lab markers advised.  2. No evidence of abscess or osteomyelitis.  This report was flagged in Epic as abnormal.     NM PET CT 12/27/18:  Impression       See above    Significant improvement in all the previously seen lymph nodes involving the left lower neck, supraclavicular region, axillary and retropectoral region.    Index left retropectoral SUV max 3.57, previously 27.2.     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 Biopsied.                        - Small hiatal hernia.                        - Normal stomach.                        - Normal examined  duodenum.  Recommendation:       - Return patient to hospital to for ongoing care.                        - Await pathology results.                        - Use a proton pump inhibitor PO BID.                        - The findings and recommendations were discussed                         with the designated responsible adult.                        - Repeat upper endoscopy in 8 weeks to check                         healing.           Assessment/Plan:     * Myositis    59yo F with history of L sided infiltrating ductal carcinoma of the L breast (dx on Jan 2017), HTN, depression, and gastritis was send from hem/onc clinic for evaluation of possible inflammatory myositis.      Patient started on Atelizumab/Abraxane on 11/7/18. Per chart review, patient noticed rashes on the dorsum of her hands on 11/11/18. Seen in urgent care and given topical steroid cream. Then received two more infusions on Atelizumab/Abraxane on 11/21/18 and 12/5/18. Started to notice puffiness around the eyes on 12/11/18. Received another Abraxane infusion on 12/12/18 but this time with hydrocortisone 50 mg which helped reduce the swelling around the eyes. Developed swelling of the face again on 12/15/18. Next infusion of Abraxane done on 12/19/18 with Solucortef and Atelizumab held. Abraxane given again on 1/3/19 with no IV steroid. Patient developed swelling of the face on 1/4/19 and went to urgent care and given short course of prednisone 20 mg BID. On 1/15/19, patient seen in hem/onc clinic with c/o of pressure and tightness around neck. CT scan of chest and neck did not reveal any vascular compression. Started on prednisone 60 mg with taper. On 1/22/18 patient with c/o proximal muscle weakness. CPK found to be elevated around 4k. Patient admitted and given solumedrol 80 mg IV on 1/22/18. Last infusion of Atelizumab on 12/19/18. Last infusion of Abraxane on 1/3/19. Given Solumedrol 1g x 1 on 1/23/18. Seen by Dermatology on 1/24/18 and  "biopsy done of skin rash. Given distribution of rashes, there was concern for dermatomyositis. Patient started on Solumedrol 125 mg IV BID at that time.  Skin biopsy resulted consistent with dermatomyositis.     Labs: ESR 50, CRP 31.1, CPK 4562 (trending down), Aldolase 13.6.  , ALT 64.  UA normal.  CBC unremarkable.  +DARCY 1:640 speckled with negative profile.  Complements normal. Normal GGT. RPR, HIV negative. Hep B/C negative.  Strongyloides negative. HMGCR antibody negative.  Ferritin elevated at 641, iron on low side at 37, tibc low at 247, transferrin low 167, haptoglobin 153, retic slightly increased at 2.6%, ldh increased at 325. quantTB: indeterminate     Spirometry: normal, normal diffusion capacity. FVC: 81%, DLCO: 114%     Case reports of docetaxel related inflammatory myositis had been documented. "...taxanes have been noted to cause disabling but transient arthralgia and myalgias; it is important to consider the possibility of inflammatory myopathy as a possible complication in patients undergoing treatment with these agents." - A case of docetaxel induced myositis and review of literature. Austin et al 2015.     Case reports in Rheumatology   Case reports of atezolizumb (immunomodulator) cause of myositis  - Lucy et al 2017 "Myositis as an adverse even of immune checkpoint blockade for cancer therapy.  Seminars in Arthritis and Rheumatism      Patient exhibits signs of dermatomyositis (heliotropic rash and gottron's papules).   Dermatomyositis can be associated with malignancy.  MRI of LUE showed edema of the deltoid and biceps.  Exam with proximal muscle weakness: b/l deltoids 4/5, b/l iliopsoas 4.4/5. 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     Failed 2nd " swallow eval on 2/6/19. PEG placed 2/7/2019.     Inpatient treatments so far:  - Solumedrol 80 mg IV x 1 on 1/22/19  - Solumedrol 1 g IV x 1 on 1/23/19.  - Solumedrol 125 mg IV BID since 1/24/19 -->being tapered down.  - IVIG 1/29/19-2/2     Plan:  - chemotherapy can be re-started as it may help treat Dermatomyositis. Would recommend against checkpoint inhibitor, however, as it may have been a trigger for the dermatomyositis  - continue to monitor CPK, aldolase, AST/ALT  - recommend changing to Medrol 48 mg oral daily now that patient has PEG placed.  - completed IVIg 400 mg/kg daily x 5 days. Will need IVIG 1 g/kg on 2/28/19.   - should get colonoscopy as outpatient as part of malignancy work-up   - MTX to 20 mg sc weekly (Tuesdays) with daily folic acid.   - follow up T-spot  - recommend Rehab after discharge to help with strengthening.  - recommend ID consult for vaccinations.  - f/u esophageal biopsy results  - f/u myomarker panel   - PFTs with lung volumes as outpatient.   - 1200 mg dietary calcium and Vitamin D3 1000 mg daily  - continue H2 blocker  - f/u with Dr. Swift and Dr. Campos in Rheumatology on 2/15/19 at 2PM           Lynn Espinoza,   Rheumatology  [facility]        I  Have personally take the history and examined the patient and agree with fellow's note as stated above.    Can change Solu-Medrol to Medrol 48 mg daily via PEG  T-Spot TB pending  IVIg 1g/kg daily x 2 end of Feb  Lung volumes as outpatient  Await esophageal biopsies  Cont PT/OT then Rehab/PM&R

## 2019-02-08 NOTE — ASSESSMENT & PLAN NOTE
57yo F with history of L sided infiltrating ductal carcinoma of the L breast (dx on Jan 2017), HTN, depression, and gastritis was send from hem/onc clinic for evaluation of possible inflammatory myositis.      Patient started on Atelizumab/Abraxane on 11/7/18. Per chart review, patient noticed rashes on the dorsum of her hands on 11/11/18. Seen in urgent care and given topical steroid cream. Then received two more infusions on Atelizumab/Abraxane on 11/21/18 and 12/5/18. Started to notice puffiness around the eyes on 12/11/18. Received another Abraxane infusion on 12/12/18 but this time with hydrocortisone 50 mg which helped reduce the swelling around the eyes. Developed swelling of the face again on 12/15/18. Next infusion of Abraxane done on 12/19/18 with Solucortef and Atelizumab held. Abraxane given again on 1/3/19 with no IV steroid. Patient developed swelling of the face on 1/4/19 and went to urgent care and given short course of prednisone 20 mg BID. On 1/15/19, patient seen in hem/onc clinic with c/o of pressure and tightness around neck. CT scan of chest and neck did not reveal any vascular compression. Started on prednisone 60 mg with taper. On 1/22/18 patient with c/o proximal muscle weakness. CPK found to be elevated around 4k. Patient admitted and given solumedrol 80 mg IV on 1/22/18. Last infusion of Atelizumab on 12/19/18. Last infusion of Abraxane on 1/3/19. Given Solumedrol 1g x 1 on 1/23/18. Seen by Dermatology on 1/24/18 and biopsy done of skin rash. Given distribution of rashes, there was concern for dermatomyositis. Patient started on Solumedrol 125 mg IV BID at that time.  Skin biopsy resulted consistent with dermatomyositis.     Labs: ESR 50, CRP 31.1, CPK 4562 (trending down), Aldolase 13.6.  , ALT 64.  UA normal.  CBC unremarkable.  +DARCY 1:640 speckled with negative profile.  Complements normal. Normal GGT. RPR, HIV negative. Hep B/C negative.  Strongyloides negative. HMGCR antibody  "negative.  Ferritin elevated at 641, iron on low side at 37, tibc low at 247, transferrin low 167, haptoglobin 153, retic slightly increased at 2.6%, ldh increased at 325. quantTB: indeterminate     Spirometry: normal, normal diffusion capacity. FVC: 81%, DLCO: 114%     Case reports of docetaxel related inflammatory myositis had been documented. "...taxanes have been noted to cause disabling but transient arthralgia and myalgias; it is important to consider the possibility of inflammatory myopathy as a possible complication in patients undergoing treatment with these agents." - A case of docetaxel induced myositis and review of literature. Austin et al 2015.     Case reports in Rheumatology   Case reports of atezolizumb (immunomodulator) cause of myositis  - Lucy et al 2017 "Myositis as an adverse even of immune checkpoint blockade for cancer therapy.  Seminars in Arthritis and Rheumatism      Patient exhibits signs of dermatomyositis (heliotropic rash and gottron's papules).   Dermatomyositis can be associated with malignancy.  MRI of LUE showed edema of the deltoid and biceps.  Exam with proximal muscle weakness: b/l deltoids 4/5, b/l iliopsoas 4.4/5. 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     Failed 2nd swallow eval on 2/6/19. PEG placed 2/7/2019.     Inpatient treatments so far:  - Solumedrol 80 mg IV x 1 on 1/22/19  - Solumedrol 1 g IV x 1 on 1/23/19.  - Solumedrol 125 mg IV BID since 1/24/19 -->being tapered down.  - IVIG 1/29/19-2/2     Plan:  - chemotherapy can be re-started as it may help treat Dermatomyositis. Would recommend against checkpoint inhibitor, however, as it may have been a trigger for the dermatomyositis  - continue to monitor CPK, aldolase, " AST/ALT  - recommend changing to Medrol 48 mg oral daily now that patient has PEG placed.  - completed IVIg 400 mg/kg daily x 5 days. Will need IVIG 1 g/kg on 2/28/19.   - should get colonoscopy as outpatient as part of malignancy work-up   - MTX to 20 mg sc weekly (Tuesdays) with daily folic acid.   - follow up T-spot  - recommend Rehab after discharge to help with strengthening.  - recommend ID consult for vaccinations.  - f/u esophageal biopsy results  - f/u myomarker panel   - PFTs with lung volumes as outpatient.   - 1200 mg dietary calcium and Vitamin D3 1000 mg daily  - continue H2 blocker  - f/u with Dr. Swift and Dr. Campos in Rheumatology on 2/15/19 at 2PM

## 2019-02-08 NOTE — PROGRESS NOTES
[facility]  Hematology/Oncology  Progress Note    Patient Name: Ermelinda Verde  Admission Date: 1/22/2019  Hospital Length of Stay: 17 days  Code Status: Full Code     Subjective:     HPI:  Ms Verde is a 59 yo F with a prior diagnosis of L sided breast cancer, s/p 3 cycles of nab-paclitaxel and atezolizumab who presents from clinic with a roughly week long, multi-day history of proximal muscle weakness in the shoulder girdle muscles, bilateral and associated with mild tenderness. She reports generalized weakness, but strength impairment with the use of her upper extremities more than lower extremities, and her ADLs are intact. Her last PET scan in December 2018 showed almost complete resolution of her adenopathy, and she had been complaining of a rash, which had been attributed to Nab paclitaxel. Last week, 1/15, she had a CT neck/ since there was concern for facial swelling per symptoms, and the CT showed lymphadenopathy without airway or vascular compromise. 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 Most recently, the patient had been on 60mg of prednisone with a taper and had noted swelling, proximal weakness. She also notes some progressive swallowing difficulty, but attributes this to candidal thrush for which she takes nystatin scheduled. She reports poor PO intake preceding admission, dark, mario colored urine, but denies fevers or joint pain.      Onc History per Dr. Reyna:   She developed a palpable abnormality in her left breast in January 2017 which she noted on self-examination.  A diagnostic mammogram on January 19 showed a greater than 1 cm nodule in the upper outer portion of left breast.  By ultrasound this was lobulated and hypoechoic measuring 1.75 x 1.51 x 1.96 cm.     On January 24,  2017 a core needle biopsy was performed which showed infiltrating ductal carcinoma, high grade.  The tumor was ER negative, IN negative, and HER-2 negative.  A follow-up ultrasound on December 6 showed 2.5 x 2.2 x 1.5 cm left breast mass.  There was no abnormality noted in the left axilla.     She underwent sentinel lymph node biopsy on February 22.  That showed 4 negative lymph nodes.     She had 4 cycles of  Wilbur-adjuvantTaxotere and Cytoxan completed  on 5/9/17.     On June 26 she underwent left mastectomy.  That revealed 2 foci of invasive high-grade carcinoma measuring 14 mm and 1.5 mm.  Margins were negative.     She completed 4 cycles of adjuvant Adriamycin on September 12, 2017.     In October, she developed some left supraclavicular lymphadenopathy which turned out to be recurrence.     A fine-needle aspirate of the lymph node was performed on October 19th.  That showed metastatic carcinoma consistent with breast primary which was ER negative, IN negative and HER 2-negative.           Interval History:   PEG tube placement completed. Will need nutrition. There are plans to send her to rehab.     Oncology Treatment Plan:   OP BREAST DOCETAXEL Q3W    Medications:  Continuous Infusions:   dextrose 5 % and 0.9 % NaCl 100 mL/hr at 02/07/19 9245     Scheduled Meds:   amLODIPine  10 mg Per NG tube Daily    calcium-vitamin D3  1 tablet Oral BID    escitalopram oxalate  10 mg Per NG tube Daily    famotidine  20 mg Per NG tube BID    folic acid  1 mg Oral Daily    guaifenesin 100 mg/5 ml  200 mg Per NG tube Q4H    methylPREDNISolone sodium succinate  24 mg Intravenous Q12H     PRN Meds:acetaminophen, ALPRAZolam, dextrose 50%, dextrose 50%, glucagon (human recombinant), glucose, glucose, hepatitis B, hydrALAZINE, insulin aspart U-100, morphine, ondansetron, pneumoc 13-brandin conj-dip cr(PF), polyethylene glycol, promethazine, sodium chloride, sodium chloride 0.9%, sodium chloride 0.9%     Review of Systems    Constitutional: Positive for fatigue. Negative for activity change, appetite change, chills, diaphoresis, fever and unexpected weight change.   HENT: Positive for facial swelling and trouble swallowing. Negative for congestion, ear discharge, hearing loss, postnasal drip, sinus pressure, sneezing, sore throat and tinnitus.    Eyes: Negative for photophobia, pain and redness.   Respiratory: Negative for apnea, cough, choking, chest tightness, shortness of breath and stridor.    Cardiovascular: Negative for chest pain, palpitations and leg swelling.   Gastrointestinal: Negative for abdominal pain, anal bleeding, constipation, diarrhea, nausea and rectal pain.   Endocrine: Negative for polyuria.   Genitourinary: Negative for dysuria and hematuria.   Musculoskeletal: Negative for arthralgias, back pain, gait problem, joint swelling, myalgias, neck pain and neck stiffness.   Skin: Positive for rash. Negative for color change and pallor.   Allergic/Immunologic: Negative for immunocompromised state.   Neurological: Positive for weakness. Negative for dizziness, seizures and numbness.   Hematological: Positive for adenopathy. Does not bruise/bleed easily.   Psychiatric/Behavioral: Negative for agitation and behavioral problems.     Objective:     Vital Signs (Most Recent):  Temp: 98 °F (36.7 °C) (02/08/19 0405)  Pulse: 95 (02/08/19 0405)  Resp: 18 (02/08/19 0405)  BP: 118/65 (02/08/19 0405)  SpO2: 99 % (02/08/19 0533) Vital Signs (24h Range):  Temp:  [97.7 °F (36.5 °C)-99.3 °F (37.4 °C)] 98 °F (36.7 °C)  Pulse:  [] 95  Resp:  [16-26] 18  SpO2:  [98 %-100 %] 99 %  BP: (113-135)/(60-79) 118/65     Weight: 73.8 kg (162 lb 11.2 oz)  Body mass index is 27.07 kg/m².  Body surface area is 1.84 meters squared.      Intake/Output Summary (Last 24 hours) at 2/8/2019 0815  Last data filed at 2/8/2019 0600  Gross per 24 hour   Intake 1026.67 ml   Output 1700 ml   Net -673.33 ml       Physical Exam   Constitutional: She is  oriented to person, place, and time. She appears well-developed and well-nourished. No distress.   HENT:   Head: Atraumatic.   Nose: Nose normal.   Mouth/Throat: No oropharyngeal exudate.   Eyes: Conjunctivae and EOM are normal. Pupils are equal, round, and reactive to light. Right eye exhibits no discharge. Left eye exhibits no discharge. No scleral icterus.   Neck: Normal range of motion. Neck supple. No tracheal deviation present.   Cardiovascular: Normal rate, regular rhythm and intact distal pulses. Exam reveals no gallop and no friction rub.   Pulmonary/Chest: Effort normal and breath sounds normal. No respiratory distress. She has no wheezes. She has no rales. She exhibits no tenderness.   Abdominal: Soft. Bowel sounds are normal. She exhibits no distension and no mass. There is no tenderness. There is no rebound and no guarding.   Musculoskeletal: Normal range of motion. She exhibits no tenderness or deformity.   Neurological: She is alert and oriented to person, place, and time. No cranial nerve deficit or sensory deficit.     4.5/5 shoulder strength on shoulder abduction; improving  5/5 flexion and extension preserved at elbow   Skin: Skin is warm and dry. Capillary refill takes less than 2 seconds. No rash noted. She is not diaphoretic. No erythema. No pallor.   Psychiatric: She has a normal mood and affect.   Vitals reviewed.      Significant Labs:   BMP:   Recent Labs   Lab 02/07/19  0350 02/08/19 0344   GLU 96 123*   * 135*   K 4.4 4.1    103   CO2 28 26   BUN 27* 19   CREATININE 0.6 0.6   CALCIUM 9.0 8.5*   MG 2.1 2.0   , CBC:   Recent Labs   Lab 02/07/19  0350 02/08/19 0344   WBC 8.85 9.23   HGB 7.5* 7.3*   HCT 24.4* 23.4*    165   , CMP:   Recent Labs   Lab 02/07/19  0350 02/08/19 0344   * 135*   K 4.4 4.1    103   CO2 28 26   GLU 96 123*   BUN 27* 19   CREATININE 0.6 0.6   CALCIUM 9.0 8.5*   PROT 6.8 6.1   ALBUMIN 2.8* 2.5*   BILITOT 0.5 0.5   ALKPHOS 66 58   AST  87* 76*   ALT 35 36   ANIONGAP 6* 6*   EGFRNONAA >60.0 >60.0   , Coagulation: No results for input(s): PT, INR, APTT in the last 48 hours., Haptoglobin: No results for input(s): HAPTOGLOBIN in the last 48 hours., Immunology: No results for input(s): SPEP, ERVIN, DARCY, FREELAMBDALI in the last 48 hours., LDH: No results for input(s): LDHCSF, BFSOURCE in the last 48 hours., LFTs:   Recent Labs   Lab 02/07/19  0350 02/08/19  0344   ALT 35 36   AST 87* 76*   ALKPHOS 66 58   BILITOT 0.5 0.5   PROT 6.8 6.1   ALBUMIN 2.8* 2.5*   , Reticulocytes: No results for input(s): RETIC in the last 48 hours., Tumor Markers: No results for input(s): PSA, CEA, , AFPTM, [id number],  in the last 48 hours.    Invalid input(s): ALGTM, Uric Acid No results for input(s): URICACID in the last 48 hours., Urine Studies: No results for input(s): COLORU, APPEARANCEUA, PHUR, SPECGRAV, PROTEINUA, GLUCUA, KETONESU, BILIRUBINUA, OCCULTUA, NITRITE, UROBILINOGEN, LEUKOCYTESUR, RBCUA, WBCUA, BACTERIA, SQUAMEPITHEL, HYALINECASTS in the last 48 hours.    Invalid input(s): WRIGHTSUR and All pertinent labs from the last 24 hours have been reviewed.    Diagnostic Results:  EXAMINATION:  FL MODIFIED BARIUM SWALLOW SPEECH STUDY    CLINICAL HISTORY:  repeat to determine diet initiation.;    TECHNIQUE:  Modified Barium Swallow Study. Video fluoroscopic swallowing examination was performed in conjunction with the speech pathology department.  Various liquid and solid food substances were used to assess swallowing.    Fluoroscopy Time: 1.9 minutes    COMPARISON:  Fluoroscopic modified barium swallow 01/28/2019      Impression       Transit: Delayed oral transit time. Significant vallecular and pyriform recess residual/layering.    Penetration/Aspiration: Aspiration following the consumption of liquids.  Significant stasis of puree food with high risk of aspiration.    Other: N/A.    See speech pathology report for further details.    Electronically signed by  resident: Latrell Garcia MD  Date: 02/06/2019  Time: 15:43    Electronically signed by: Shay Reddy MD  Date: 02/06/2019         Assessment/Plan:     * Myositis    - DDX dermatomyositis de haylie vs related to immunotherapy  - trend CPK daily; downtrending generally with a slight bump on 01/27  -restarted fluids 01/26 due to NPO  - myositis labs suspicious for myosits; rheum on board.   - F/u 1/24 skin biopsy. Pending results may need muscle biopsy by general surgery  -MRI left humerus suspicious for myositis    Per rheumatology  - Methylpred 125mg IV bid started, now tapered to 24mg IV bid, to transition to PO pred 48mg daily  - MTX 15mg subQ qWeekly , now 20mg qWeekly (last dose 2/6/2019)  - IVIG x5 days completed 2/3/19 will require 1x maintenance dose 4 weeks from 2/3 per rheum  - myomarker panel, HMGCR, QG-TB pending  - PFTs as OP  - Quantitative IgA 533, IgG 932, IgM 66  - ID consulted for fast track vacc  - f/u with Dr. Swift and Dr. Campos in Rheumatology at discharge  - Currently tapering steroids per rheumatology, transition to PO medrol 48mg daily once swallow study passed    Lab Results   Component Value Date     (H) 02/08/2019     (H) 02/07/2019     (H) 02/06/2019     (H) 02/05/2019     (H) 02/04/2019          Hypokalemia    Replete prn     Dysphagia    Patient is experiencing difficulty swallowing that has been increasing in severity over last couple of days to the point where patient did not feel as if she could swallow.  As of 01/26, SLP evaluated patient and determined that she should be NPO except for medications until a modified barium swallow could be performed. Possibly candidal esophagitis; patient has oral thrush.    Plan:  - High aspiration risk on modified barium study, strict NPO  - NGT in situ, tube feeds initiated per dietary consult  - Switched to IV meds where possible  - Fluconazole IVPB 200mg q24hr (now d/c'd)  - GI consulted, EGD resulted w/ Grade  C erosive esophagitis, no candidal infxn identified. Bx obtained, GI recs start PPI bid  - Failed swallow study after NGT removed  - PEG completed yetserday, 2/7  - Dietary/nutrition consult for tube feeds and education  - Medications per PEG tube  - Home health SLP therapy to work on swallowing improvement     Swelling of upper arm    -B/L U/S negative for DVTs  -appears less edematous than day prior  -continue to monitor     Dermatitis    - Skin biopsy from 1/24 pending, appreciate dermatology recs     Candidiasis    Oral thrush  -cont Nystatin swish and swallow. Additionally, added fluconaozle 200 mg Po qday on 01/26 as possibility patient is experiencing esophagitis 2/2 to candida  -appears resolved now s/p fluconazole and nystatin     Rhabdomyolysis    -see myositis. Continue supportive care, Cr/ electrolyte/ CPK monitoring and aggressive hydration. transaminitis on CMP is likely due to skeletal muscle rhabdo, not hepatic     Drug rash    - Unclear if symptoms related to chemotherapy     Pre-diabetes    -check a1c, 0-5u SSI while on steroids, add prandial insulin as warranted.      Adjustment disorder with depressed mood    -cont home SSRI     Vitamin B12 deficiency    -cont home B12 supplementation      Breast cancer of upper-outer quadrant of left female breast    -On January 24, 2017 a core needle biopsy was performed which showed infiltrating ductal carcinoma, high grade.  The tumor was ER negative, MO negative, and HER-2 negative.  -She had 4 cycles of  Wilbur-adjuvantTaxotere and Cytoxan completed  on 5/9/17.  -On June 26 she underwent left mastectomy.  That revealed 2 foci of invasive high-grade carcinoma measuring 14 mm and 1.5 mm.  Margins were negative.  -She completed 4 cycles of adjuvant Adriamycin on September 12, 2017.  -  A fine-needle aspirate of the lymph node was performed on October 19th.  That showed metastatic carcinoma consistent with breast primary which was ER negative, MO negative and HER  2-negative.    -Per rheumatology: hold paclitaxel agent and atezolizumb at this time - both can cause myositis      Hypertension    -on home amlodipine-benazepril; will hold ACEi for now and observe renal function with CPK elevation in rhabdo.     Plan:  - Amlodipine 10 mg qday on admission  - 1/28 Pt now strict NPO due to failed swallow study, now on hydralazine 10mg IV q8h WCTM and adjust PRN          To be discussed with Dr. Sabas Mcguire MD  Hematology/Oncology  [facility]      ATTENDING NOTE, ONCOLOGY INPATIENT TEAM    As above; events of last 24 hours noted.  Patient seen and examined, chart reviewed.  Appears comfortable, in NAD.  Lungs are clear to auscultation.  Abdomen is soft, nontender.  Labs reviewed.    PLAN  Follow electrolytes daily.  Restart tube feedings.  Continue steroids and weekly MTX.  Restart DVTt prophylaxis with enoxaparin 40 mg QD.Awaiting trabnsfer to the rehab hospital early next week.  We will follow.      Ashwin Stewart MD

## 2019-02-08 NOTE — TELEPHONE ENCOUNTER
Returned call to patient. No Answer, voicemail left to return call to clinic to discuss what [facility] needs from us.       ----- Message from Hilario Vuong sent at 2/8/2019  1:15 PM CST -----  Contact: Patient  Calling in regards to insurance company ([employer]) needing an update from Dr. Reyna.    Contact:: [phone]

## 2019-02-08 NOTE — ASSESSMENT & PLAN NOTE
-2/2 myositis     Recommendations  -  Encourage mobility, OOB in chair, and early ambulation as appropriate  -  PT/OT evaluate and treat  -  Pain management  -  DVT prophylaxis  -  Monitor for and prevent skin breakdown and pressure ulcers  · Early mobility, repositioning/weight shifting every 20-30 minutes when sitting, turn patient every 2 hours, proper mattress/overlay and chair cushioning, pressure relief/heel protector boots

## 2019-02-08 NOTE — TREATMENT PLAN
Gastroenterology Treatment Plan  2/8/2019  7:31 AM    PEG site examined. Bumper at 3 cm yuniel at skin level.   Bumper rotates 360 degrees with ease.   Abdomen is soft and non-tender.  May start using PEG for feeding.   Be advised to elevate head end of bed to 30 degrees or more while using PEG.   Please consult nutrition for tube feed goals and recommendations.    Thank you for allowing us to participate in the care of Ermelinda CLAY Gibbsdayday.   Please do not hesitate to reconsult us with questions.    Alisia Blas M.D.  Gastroenterology Fellow, PGY-V  Pager: [phone]  [facility]

## 2019-02-08 NOTE — SUBJECTIVE & OBJECTIVE
Interval History: pt had PEG tube placed 2/7.  Pt stated she stills feels weak, about the same as yesterday but overall has noticed an improvement.  She denied new rashes, no joint pain or swelling.     Current Facility-Administered Medications   Medication Frequency    acetaminophen tablet 650 mg Q4H PRN    ALPRAZolam tablet 0.25 mg TID PRN    amLODIPine tablet 10 mg Daily    dextrose 5 % and 0.9 % NaCl infusion Continuous    dextrose 50% injection 12.5 g PRN    dextrose 50% injection 25 g PRN    escitalopram oxalate tablet 10 mg Daily    famotidine tablet 20 mg BID    folic acid tablet 1 mg Daily    glucagon (human recombinant) injection 1 mg PRN    glucose chewable tablet 16 g PRN    glucose chewable tablet 24 g PRN    guaifenesin 100 mg/5 ml syrup 200 mg Q4H    hepatitis B (HEPLISAV-B) 20 mcg/0.5 mL vaccine 0.5 mL vaccine x 1 dose    hydrALAZINE injection 10 mg Q6H PRN    insulin aspart U-100 pen 0-5 Units QID (AC + HS) PRN    methylPREDNISolone sodium succinate injection 24 mg Q12H    morphine injection 2 mg Q6H PRN    ondansetron disintegrating tablet 8 mg Q8H PRN    pneumoc 13-brandin conj-dip cr(PF) (PREVNAR 13 (PF)) 0.5 mL vaccine x 1 dose    polyethylene glycol packet 17 g BID PRN    promethazine tablet 12.5 mg Q6H PRN    sodium chloride 0.65 % nasal spray 1 spray PRN    sodium chloride 0.9% flush 10 mL PRN    sodium chloride 0.9% flush 5 mL PRN     Objective:     Vital Signs (Most Recent):  Temp: 98 °F (36.7 °C) (02/08/19 0818)  Pulse: 86 (02/08/19 0818)  Resp: 18 (02/08/19 0818)  BP: 118/63 (02/08/19 0818)  SpO2: 99 % (02/08/19 0818)  O2 Device (Oxygen Therapy): room air (02/08/19 0818) Vital Signs (24h Range):  Temp:  [97.8 °F (36.6 °C)-99.3 °F (37.4 °C)] 98 °F (36.7 °C)  Pulse:  [] 86  Resp:  [16-26] 18  SpO2:  [98 %-100 %] 99 %  BP: (113-135)/(60-79) 118/63     Weight: 73.8 kg (162 lb 11.2 oz) (02/08/19 0400)  Body mass index is 27.07 kg/m².  Body surface area is 1.84 meters  squared.      Intake/Output Summary (Last 24 hours) at 2/8/2019 1048  Last data filed at 2/8/2019 0600  Gross per 24 hour   Intake 1026.67 ml   Output 1700 ml   Net -673.33 ml       Physical Exam   Constitutional: She is oriented to person, place, and time and well-developed, well-nourished, and in no distress. No distress.   HENT:   Head: Normocephalic and atraumatic.   Right Ear: External ear normal.   Left Ear: External ear normal.   Eyes: Conjunctivae and EOM are normal. Pupils are equal, round, and reactive to light.   Neck: Normal range of motion. Neck supple.   Cardiovascular: Normal rate, regular rhythm, normal heart sounds and intact distal pulses.    Pulmonary/Chest: Effort normal and breath sounds normal. No respiratory distress.   Abdominal: Soft. Bowel sounds are normal.       Right Side Rheumatological Exam     Muscle Strength (0-5 scale):  Neck Flexion:  4.6  Neck Extension: 5  Deltoid:  4  Biceps: 5/5   Triceps:  4.4  : 5/5   Iliopsoas: 4  Quadriceps:  5   Distal Lower Extremity: 5    Left Side Rheumatological Exam     Muscle Strength (0-5 scale):  Neck Flexion:  4.6  Neck Extension: 5  Deltoid:  4  Biceps: 5/5   Triceps:  4.4  :  5/5   Iliopsoas: 4  Quadriceps:  5   Distal Lower Extremity: 5      Neurological: She is alert and oriented to person, place, and time.   Skin: Skin is warm and dry. No rash noted.     Hyperpigmented non raised rash over nape of neck, elbows, knuckles (resemble Gottron's papules), thighs, and ankles -improving       Psychiatric: Mood, memory, affect and judgment normal.   Musculoskeletal: Normal range of motion. She exhibits no edema, tenderness or deformity.         Significant Labs:  Recent Results (from the past 48 hour(s))   Comprehensive Metabolic Panel (CMP)    Collection Time: 02/07/19  3:50 AM   Result Value Ref Range    Sodium 134 (L) 136 - 145 mmol/L    Potassium 4.4 3.5 - 5.1 mmol/L    Chloride 100 95 - 110 mmol/L    CO2 28 23 - 29 mmol/L    Glucose 96 70  - 110 mg/dL    BUN, Bld 27 (H) 6 - 20 mg/dL    Creatinine 0.6 0.5 - 1.4 mg/dL    Calcium 9.0 8.7 - 10.5 mg/dL    Total Protein 6.8 6.0 - 8.4 g/dL    Albumin 2.8 (L) 3.5 - 5.2 g/dL    Total Bilirubin 0.5 0.1 - 1.0 mg/dL    Alkaline Phosphatase 66 55 - 135 U/L    AST 87 (H) 10 - 40 U/L    ALT 35 10 - 44 U/L    Anion Gap 6 (L) 8 - 16 mmol/L    eGFR if African American >60.0 >60 mL/min/1.73 m^2    eGFR if non African American >60.0 >60 mL/min/1.73 m^2   Magnesium    Collection Time: 02/07/19  3:50 AM   Result Value Ref Range    Magnesium 2.1 1.6 - 2.6 mg/dL   Phosphorus    Collection Time: 02/07/19  3:50 AM   Result Value Ref Range    Phosphorus 4.1 2.7 - 4.5 mg/dL   CK    Collection Time: 02/07/19  3:50 AM   Result Value Ref Range     (H) 20 - 180 U/L   CBC auto differential    Collection Time: 02/07/19  3:50 AM   Result Value Ref Range    WBC 8.85 3.90 - 12.70 K/uL    RBC 2.80 (L) 4.00 - 5.40 M/uL    Hemoglobin 7.5 (L) 12.0 - 16.0 g/dL    Hematocrit 24.4 (L) 37.0 - 48.5 %    MCV 87 82 - 98 fL    MCH 26.8 (L) 27.0 - 31.0 pg    MCHC 30.7 (L) 32.0 - 36.0 g/dL    RDW 19.6 (H) 11.5 - 14.5 %    Platelets 179 150 - 350 K/uL    MPV 10.8 9.2 - 12.9 fL    Immature Granulocytes 0.7 (H) 0.0 - 0.5 %    Gran # (ANC) 7.8 (H) 1.8 - 7.7 K/uL    Immature Grans (Abs) 0.06 (H) 0.00 - 0.04 K/uL    Lymph # 0.5 (L) 1.0 - 4.8 K/uL    Mono # 0.5 0.3 - 1.0 K/uL    Eos # 0.0 0.0 - 0.5 K/uL    Baso # 0.00 0.00 - 0.20 K/uL    nRBC 0 0 /100 WBC    Gran% 87.5 (H) 38.0 - 73.0 %    Lymph% 5.9 (L) 18.0 - 48.0 %    Mono% 5.9 4.0 - 15.0 %    Eosinophil% 0.0 0.0 - 8.0 %    Basophil% 0.0 0.0 - 1.9 %    Differential Method Automated    Aldolase    Collection Time: 02/07/19  3:50 AM   Result Value Ref Range    Aldolase 4.6 1.2 - 7.6 U/L   Comprehensive Metabolic Panel (CMP)    Collection Time: 02/08/19  3:44 AM   Result Value Ref Range    Sodium 135 (L) 136 - 145 mmol/L    Potassium 4.1 3.5 - 5.1 mmol/L    Chloride 103 95 - 110 mmol/L    CO2 26 23 -  29 mmol/L    Glucose 123 (H) 70 - 110 mg/dL    BUN, Bld 19 6 - 20 mg/dL    Creatinine 0.6 0.5 - 1.4 mg/dL    Calcium 8.5 (L) 8.7 - 10.5 mg/dL    Total Protein 6.1 6.0 - 8.4 g/dL    Albumin 2.5 (L) 3.5 - 5.2 g/dL    Total Bilirubin 0.5 0.1 - 1.0 mg/dL    Alkaline Phosphatase 58 55 - 135 U/L    AST 76 (H) 10 - 40 U/L    ALT 36 10 - 44 U/L    Anion Gap 6 (L) 8 - 16 mmol/L    eGFR if African American >60.0 >60 mL/min/1.73 m^2    eGFR if non African American >60.0 >60 mL/min/1.73 m^2   Magnesium    Collection Time: 02/08/19  3:44 AM   Result Value Ref Range    Magnesium 2.0 1.6 - 2.6 mg/dL   Phosphorus    Collection Time: 02/08/19  3:44 AM   Result Value Ref Range    Phosphorus 3.1 2.7 - 4.5 mg/dL   CK    Collection Time: 02/08/19  3:44 AM   Result Value Ref Range     (H) 20 - 180 U/L   CBC auto differential    Collection Time: 02/08/19  3:44 AM   Result Value Ref Range    WBC 9.23 3.90 - 12.70 K/uL    RBC 2.59 (L) 4.00 - 5.40 M/uL    Hemoglobin 7.3 (L) 12.0 - 16.0 g/dL    Hematocrit 23.4 (L) 37.0 - 48.5 %    MCV 90 82 - 98 fL    MCH 28.2 27.0 - 31.0 pg    MCHC 31.2 (L) 32.0 - 36.0 g/dL    RDW 19.9 (H) 11.5 - 14.5 %    Platelets 165 150 - 350 K/uL    MPV 10.5 9.2 - 12.9 fL    Immature Granulocytes 0.4 0.0 - 0.5 %    Gran # (ANC) 8.4 (H) 1.8 - 7.7 K/uL    Immature Grans (Abs) 0.04 0.00 - 0.04 K/uL    Lymph # 0.4 (L) 1.0 - 4.8 K/uL    Mono # 0.4 0.3 - 1.0 K/uL    Eos # 0.0 0.0 - 0.5 K/uL    Baso # 0.00 0.00 - 0.20 K/uL    nRBC 0 0 /100 WBC    Gran% 90.9 (H) 38.0 - 73.0 %    Lymph% 4.1 (L) 18.0 - 48.0 %    Mono% 4.6 4.0 - 15.0 %    Eosinophil% 0.0 0.0 - 8.0 %    Basophil% 0.0 0.0 - 1.9 %    Differential Method Automated    Aldolase    Collection Time: 02/08/19  3:44 AM   Result Value Ref Range    Aldolase 5.4 1.2 - 7.6 U/L      Ref. Range 1/31/2019 04:00 2/1/2019 04:12 2/2/2019 04:44 2/3/2019 03:29 2/4/2019 04:06 2/5/2019 04:55 2/6/2019 04:09 2/7/2019 03:50   CPK Latest Ref Range: 20 - 180 U/L 1147 (H) 827 (H) 662  (H) 517 (H) 465 (H) 434 (H) 448 (H) 357 (H)          Ref. Range 1/24/2019 03:40 1/25/2019 04:20 1/26/2019 04:00 1/27/2019 04:21 1/28/2019 04:00   Aldolase Latest Ref Range: 1.2 - 7.6 U/L 12.7 (H) 14.2 (H) 13.7 (H) 13.1 (H) 15.4 (H)      Ref. Range 1/22/2019 22:24   Sed Rate Latest Ref Range: 0 - 36 mm/Hr 50 (H)        Ref. Range 1/22/2019 22:24   CRP Latest Ref Range: 0.0 - 8.2 mg/L 31.1 (H)      Quant TB 1/30/19: indeterminate       Ref. Range 1/22/2019 22:24 1/25/2019 17:33   DARCY HEP-2 Titer Unknown Positive 1:640 Sp...    Anti-SSA Antibody Latest Ref Range: 0.00 - 19.99 EU 0.49    Anti-SSA Interpretation Latest Ref Range: Negative  Negative    Anti-SSB Antibody Latest Ref Range: 0.00 - 19.99 EU 0.11    Anti-SSB Interpretation Latest Ref Range: Negative  Negative    ds DNA Ab Latest Ref Range: Negative 1:10  Negative 1:10    Anti Sm Antibody Latest Ref Range: 0.00 - 19.99 EU 0.58    Anti-Sm Interpretation Latest Ref Range: Negative  Negative    Anti Sm/RNP Antibody Latest Ref Range: 0.00 - 19.99 EU 0.62    Anti-Sm/RNP Interpretation Latest Ref Range: Negative  Negative    DARCY Screen Latest Ref Range: Negative <1:160  Positive (A)    Complement (C-3) Latest Ref Range: 50 - 180 mg/dL  106   Complement (C-4) Latest Ref Range: 11 - 44 mg/dL  29   Meg-1 Autoantibodies Latest Ref Range: <1.0 Index <1.00       Ref. Range 1/23/2019 02:55 1/23/2019 02:56   Specimen UA Unknown Urine, Clean Catch    Color, UA Latest Ref Range: Yellow, Straw, [place]  Colorless (A)    pH, UA Latest Ref Range: 5.0 - 8.0  6.0    Specific Gravity, UA Latest Ref Range: 1.005 - 1.030  1.005    Appearance, UA Latest Ref Range: Clear  Clear    Protein, UA Latest Ref Range: Negative  Negative    Glucose, UA Latest Ref Range: Negative  Negative    Ketones, UA Latest Ref Range: Negative  Negative    Occult Blood UA Latest Ref Range: Negative  Negative    Nitrite, UA Latest Ref Range: Negative  Negative    Bilirubin (UA) Latest Ref Range: Negative   Negative    Leukocytes, UA Latest Ref Range: Negative  Trace (A)    RBC, UA Latest Ref Range: 0 - 4 /hpf  2   WBC, UA Latest Ref Range: 0 - 5 /hpf  3   Squam Epithel, UA Latest Units: /hpf  0   Microscopic Comment Unknown  SEE COMMENT     Meg-1: Negative    Significant Imaging:  MRI Humerus w/ and w/o contrast:  Impression       1. Diffuse edema and enhancement of the visualized left upper extremity musculature, most pronounced proximally, most notably of the deltoid and biceps musculature as detailed above.  Findings are nonspecific although could relate to a nonspecific myelitis particularly in light of patient's reported history.  Clinical correlation with appropriate history and lab markers advised.  2. No evidence of abscess or osteomyelitis.  This report was flagged in Epic as abnormal.     NM PET CT 12/27/18:  Impression       See above    Significant improvement in all the previously seen lymph nodes involving the left lower neck, supraclavicular region, axillary and retropectoral region.    Index left retropectoral SUV max 3.57, previously 27.2.     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 Biopsied.                        - Small hiatal hernia.                        - Normal stomach.                        - Normal examined duodenum.  Recommendation:       - Return patient to hospital to for ongoing care.                        - Await pathology results.                        - Use a proton pump inhibitor PO BID.                        - The findings and recommendations were discussed                         with the designated responsible adult.                        - Repeat upper endoscopy in 8 weeks to check                         healing.

## 2019-02-08 NOTE — CONSULTS
Inpatient consult to Physical Medicine Rehab  Consult performed by: Larissa Lezama NP  Consult ordered by: Chau Mcguire MD  Reason for consult: assess rehab needs        Additional consult placed.  Rehab team already following.  Full consult to follow.    ELLA Randolph, FNP-C  Physical Medicine & Rehabilitation   02/08/2019  Spectralink: [id number]

## 2019-02-08 NOTE — ASSESSMENT & PLAN NOTE
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   - F/u 1/24 skin biopsy. Pending results may need muscle biopsy by general surgery  -MRI left humerus suspicious for myositis    Per rheumatology  - Methylpred 125mg IV bid started, now tapered to 24mg IV bid, to transition to PO pred 48mg daily  - MTX 15mg subQ qWeekly , now 20mg qWeekly (last dose 2/6/2019)  - IVIG x5 days completed 2/3/19 will require 1x maintenance dose 4 weeks from 2/3 per rheum  - myomarker panel, HMGCR, QG-TB pending  - PFTs as OP  - Quantitative IgA 533, IgG 932, IgM 66  - ID consulted for fast track vacc  - f/u with Dr. Swift and Dr. Campos in Rheumatology at discharge  - Currently tapering steroids per rheumatology, transition to PO medrol 48mg daily once swallow study passed    Lab Results   Component Value Date     (H) 02/08/2019     (H) 02/07/2019     (H) 02/06/2019     (H) 02/05/2019     (H) 02/04/2019

## 2019-02-08 NOTE — ASSESSMENT & PLAN NOTE
-GI consulted  -EGD with bx for erosive esophagitis that are pending  - S/p fluconazole and nystatin

## 2019-02-08 NOTE — ANESTHESIA POSTPROCEDURE EVALUATION
"Anesthesia Post Evaluation    Patient: Ermelinda Verde    Procedure(s) Performed: Procedure(s) (LRB):  INSERTION, PEG TUBE (N/A)    Final Anesthesia Type: general  Patient location during evaluation: PACU  Patient participation: Yes- Able to Participate  Level of consciousness: awake and alert  Post-procedure vital signs: reviewed and stable  Pain management: adequate  Airway patency: patent  PONV status at discharge: No PONV  Anesthetic complications: no      Cardiovascular status: stable  Respiratory status: unassisted and spontaneous ventilation  Hydration status: euvolemic  Follow-up not needed.        Visit Vitals  /66 (BP Location: Right arm, Patient Position: Lying)   Pulse 89   Temp 36.7 °C (98.1 °F) (Oral)   Resp 20   Ht 5' 5" (1.651 m)   Wt 73.4 kg (161 lb 13.1 oz)   LMP  (LMP Unknown)   SpO2 99%   Breastfeeding? No   BMI 26.93 kg/m²       Pain/Joni Score: Pain Rating Prior to Med Admin: 6 (2/7/2019  4:10 PM)  Pain Rating Post Med Admin: 0 (2/6/2019  9:31 AM)  Joni Score: 10 (2/7/2019  4:30 PM)        " stated

## 2019-02-09 LAB
ALBUMIN SERPL BCP-MCNC: 2.7 G/DL
ALP SERPL-CCNC: 59 U/L
ALT SERPL W/O P-5'-P-CCNC: 46 U/L
ANION GAP SERPL CALC-SCNC: 4 MMOL/L
AST SERPL-CCNC: 79 U/L
BASOPHILS # BLD AUTO: 0 K/UL
BASOPHILS NFR BLD: 0 %
BILIRUB SERPL-MCNC: 0.5 MG/DL
BUN SERPL-MCNC: 19 MG/DL
CALCIUM SERPL-MCNC: 8.8 MG/DL
CHLORIDE SERPL-SCNC: 104 MMOL/L
CK SERPL-CCNC: 217 U/L
CO2 SERPL-SCNC: 27 MMOL/L
CREAT SERPL-MCNC: 0.5 MG/DL
DIFFERENTIAL METHOD: ABNORMAL
EOSINOPHIL # BLD AUTO: 0 K/UL
EOSINOPHIL NFR BLD: 0 %
ERYTHROCYTE [DISTWIDTH] IN BLOOD BY AUTOMATED COUNT: 19.9 %
EST. GFR  (AFRICAN AMERICAN): >60 ML/MIN/1.73 M^2
EST. GFR  (NON AFRICAN AMERICAN): >60 ML/MIN/1.73 M^2
GLUCOSE SERPL-MCNC: 120 MG/DL
HCT VFR BLD AUTO: 23.9 %
HGB BLD-MCNC: 7.5 G/DL
IMM GRANULOCYTES # BLD AUTO: 0.02 K/UL
IMM GRANULOCYTES NFR BLD AUTO: 0.5 %
LYMPHOCYTES # BLD AUTO: 0.4 K/UL
LYMPHOCYTES NFR BLD: 8.7 %
MAGNESIUM SERPL-MCNC: 2 MG/DL
MCH RBC QN AUTO: 28.1 PG
MCHC RBC AUTO-ENTMCNC: 31.4 G/DL
MCV RBC AUTO: 90 FL
MONOCYTES # BLD AUTO: 0.3 K/UL
MONOCYTES NFR BLD: 6.4 %
NEUTROPHILS # BLD AUTO: 3.7 K/UL
NEUTROPHILS NFR BLD: 84.4 %
NRBC BLD-RTO: 0 /100 WBC
PHOSPHATE SERPL-MCNC: 2.7 MG/DL
PLATELET # BLD AUTO: 167 K/UL
PMV BLD AUTO: 10.6 FL
POTASSIUM SERPL-SCNC: 3.8 MMOL/L
PROT SERPL-MCNC: 6.4 G/DL
RBC # BLD AUTO: 2.67 M/UL
SODIUM SERPL-SCNC: 135 MMOL/L
WBC # BLD AUTO: 4.38 K/UL

## 2019-02-09 PROCEDURE — 99231 SBSQ HOSP IP/OBS SF/LOW 25: CPT | Mod: ,,, | Performed by: INTERNAL MEDICINE

## 2019-02-09 PROCEDURE — 63600175 PHARM REV CODE 636 W HCPCS: Performed by: INTERNAL MEDICINE

## 2019-02-09 PROCEDURE — 25000003 PHARM REV CODE 250: Performed by: STUDENT IN AN ORGANIZED HEALTH CARE EDUCATION/TRAINING PROGRAM

## 2019-02-09 PROCEDURE — 99233 SBSQ HOSP IP/OBS HIGH 50: CPT | Mod: ,,, | Performed by: INTERNAL MEDICINE

## 2019-02-09 PROCEDURE — 83735 ASSAY OF MAGNESIUM: CPT

## 2019-02-09 PROCEDURE — 84100 ASSAY OF PHOSPHORUS: CPT

## 2019-02-09 PROCEDURE — 99231 PR SUBSEQUENT HOSPITAL CARE,LEVL I: ICD-10-PCS | Mod: ,,, | Performed by: INTERNAL MEDICINE

## 2019-02-09 PROCEDURE — 82085 ASSAY OF ALDOLASE: CPT

## 2019-02-09 PROCEDURE — 97168 OT RE-EVAL EST PLAN CARE: CPT

## 2019-02-09 PROCEDURE — 82550 ASSAY OF CK (CPK): CPT

## 2019-02-09 PROCEDURE — 80053 COMPREHEN METABOLIC PANEL: CPT

## 2019-02-09 PROCEDURE — 97535 SELF CARE MNGMENT TRAINING: CPT

## 2019-02-09 PROCEDURE — 20600001 HC STEP DOWN PRIVATE ROOM

## 2019-02-09 PROCEDURE — 99233 PR SUBSEQUENT HOSPITAL CARE,LEVL III: ICD-10-PCS | Mod: ,,, | Performed by: INTERNAL MEDICINE

## 2019-02-09 PROCEDURE — 85025 COMPLETE CBC W/AUTO DIFF WBC: CPT

## 2019-02-09 RX ORDER — METHYLPREDNISOLONE 4 MG/1
48 TABLET ORAL DAILY
Status: DISCONTINUED | OUTPATIENT
Start: 2019-02-09 | End: 2019-02-09

## 2019-02-09 RX ORDER — METHYLPREDNISOLONE 4 MG/1
48 TABLET ORAL DAILY
Status: DISCONTINUED | OUTPATIENT
Start: 2019-02-10 | End: 2019-02-12

## 2019-02-09 RX ADMIN — AMLODIPINE BESYLATE 10 MG: 10 TABLET ORAL at 10:02

## 2019-02-09 RX ADMIN — FAMOTIDINE 20 MG: 20 TABLET ORAL at 10:02

## 2019-02-09 RX ADMIN — GUAIFENESIN 200 MG: 100 SOLUTION ORAL at 10:02

## 2019-02-09 RX ADMIN — GUAIFENESIN 200 MG: 100 SOLUTION ORAL at 06:02

## 2019-02-09 RX ADMIN — ESCITALOPRAM OXALATE 10 MG: 5 TABLET, FILM COATED ORAL at 10:02

## 2019-02-09 RX ADMIN — GUAIFENESIN 200 MG: 100 SOLUTION ORAL at 09:02

## 2019-02-09 RX ADMIN — FOLIC ACID 1 MG: 1 TABLET ORAL at 10:02

## 2019-02-09 RX ADMIN — METHYLPREDNISOLONE SODIUM SUCCINATE 24 MG: 125 INJECTION, POWDER, FOR SOLUTION INTRAMUSCULAR; INTRAVENOUS at 10:02

## 2019-02-09 RX ADMIN — GUAIFENESIN 200 MG: 100 SOLUTION ORAL at 05:02

## 2019-02-09 RX ADMIN — ENOXAPARIN SODIUM 40 MG: 100 INJECTION SUBCUTANEOUS at 06:02

## 2019-02-09 RX ADMIN — FAMOTIDINE 20 MG: 20 TABLET ORAL at 09:02

## 2019-02-09 RX ADMIN — GUAIFENESIN 200 MG: 100 SOLUTION ORAL at 02:02

## 2019-02-09 NOTE — PLAN OF CARE
Problem: Occupational Therapy Goal  Goal: Occupational Therapy Goal  Updated Goals to be met by 7 days-- 2/16/19    Pt will complete UB dressing with Supervision.  Pt will complete functional mobility household distance with Supervision using AD as needed.  Pt will be independent with UE HEP.  Pt will increase UE strength to WFL needed for ADLs/IADLs.    Goals to be met by 2/5/2019    Pt will be indep w/ BUE HEP.  Pt will indep feed.    Outcome: Ongoing (interventions implemented as appropriate)  Re-eval and POC set 2/9/19

## 2019-02-09 NOTE — PT/OT/SLP RE-EVAL
Occupational Therapy   Re-evaluation    Name: Ermelinda Verde  MRN: [id number]  Admitting Diagnosis:  Myositis 2 Days Post-Op  INSERTION, PEG TUBE (N/A)    Recommendations:     Discharge Recommendations: rehabilitation facility  Discharge Equipment Recommendations:  walker, rolling  Barriers to discharge:  None    Assessment:     Ermelinda Verde is a 58 y.o. female with a medical diagnosis of Myositis.  She presents with performance deficits including weakness, impaired endurance, impaired self care skills, gait instability, decreased upper extremity function. Pt would continue to benefit from OT to increase UE strength and functional independence. Recommend rehab upon D/C to return to PLOF.     Rehab Prognosis:  Good; patient would benefit from acute skilled OT services to address these deficits and reach maximum level of function.       Plan:     Patient to be seen 3 x/week to address the above listed problems via self-care/home management, therapeutic activities, therapeutic exercises  · Plan of Care Expires: 03/09/19  · Plan of Care Reviewed with: patient    Subjective     Chief Complaint: UE weakness  Patient/Family stated goals: Return to PLOF  Communicated with: RN prior to session.  Pain/Comfort:  · Pain Rating 1: 0/10  · Pain Rating Post-Intervention 1: 0/10    Objective:     Communicated with: RN prior to session.  Patient found supine with: PEG Tube upon OT entry to room.    General Precautions: Standard, aspiration, fall, NPO   Orthopedic Precautions:N/A   Braces: N/A     Occupational Performance:    Bed Mobility:    · Patient completed Supine to Sit with stand by assistance with HOB elevated    Functional Mobility/Transfers:  · Patient completed Sit <> Stand Transfer with supervision with no assistive device from EOB and toilet  · Functional Mobility: Within room and hallway household distance with SBA-CGA no AD, holding hand rails at times for stability    Activities of Daily Living:  · Grooming:  stand by assistance standing at sink to complete oral hygiene, wash face  · Upper Body Dressing: minimum assistance to don gown  · Toileting: stand by assistance      Cognitive/Visual Perceptual:  WFL    Physical Exam:  Balance:    -       good sitting and standing balance  Upper Extremity Range of Motion: decreased B shld AROM due to proximal weakness, WFL PROM  Upper Extremity Strength: B shld 2+/5, elbow flex/ext 3+/5   Strength: WFL  Fine Motor Coordination: Intact    AMPA 6 Click:  AMPAC Total Score: 21    Treatment & Education:Education:    OT re-eval; educated on OT role and POC and to call for assistance before getting up  Demo understanding of seated UE therex to complete including shld shrugs/circles, shld flex/ext, punch outs    Patient left up in chair with all lines intact and call button in reach    GOALS:   Multidisciplinary Problems     Occupational Therapy Goals        Problem: Occupational Therapy Goal    Goal Priority Disciplines Outcome Interventions   Occupational Therapy Goal     OT, PT/OT Ongoing (interventions implemented as appropriate)    Description:  Updated Goals to be met by 7 days-- 19    Pt will complete UB dressing with Supervision.  Pt will complete functional mobility household distance with Supervision using AD as needed.  Pt will be independent with UE HEP.  Pt will increase UE strength to WFL needed for ADLs/IADLs.    Goals to be met by 2019    Pt will be indep w/ BUE HEP.  Pt will indep feed.                      History:     Past Medical History:   Diagnosis Date    Breast cancer 2017    left    Depression     Diverticulosis     Gastritis     Hypertension     Vitamin B12 deficiency 3/8/2018       Past Surgical History:   Procedure Laterality Date    BIOPSY-SENTINEL NODE Left 2017    [facility]    BREAST BIOPSY Left     BREAST RECONSTRUCTION Left 2017     SECTION      COLONOSCOPY  2011    repeat in  10 yrs.    D&C      EGD (ESOPHAGOGASTRODUODENOSCOPY) N/A 1/29/2019    Performed by [facility]ernell Rosas [facility] at [facility] ENDO (2ND FLR)    INSERTION, PEG TUBE N/A 2/7/2019    Performed by Zurdo Gordon [facility] at [facility] END[facility] (2ND FLR)    XRCABXDBB-YPMX-W-CATH Right 10/31/2018    Performed by [facility] Palencia [facility] at [facility] OR 2ND FLR    QUWIDBLBO-POAP-T-CATH Right 2/22/2017    [facility]    EQGXOJQTT-FZVG-H-CATH-neck or chest Fluoro needed Consent AM of surgery Right 10/30/2018    Performed by [facility] at [facility] OR 2ND FLR    MASTECTOMY Left 06/26/2017    MASTECTOMY Left 6/26/2017    Performed by Regina Rose [facility]    MYOMECTOMY      PORTACATH PLACEMENT      RECONSTRUCTION-BREAST/FLAP-SCOTT Left 6/26/2017    Per[facility]inder Sewell [facility] at [facility] OR    SENTINEL LYMPH NODE BIOPSY  02/2017    left    TOTAL REDUCTION MAMMOPLASTY Right 2017       Time Tracking:     OT Date of Treatment: 02/09/19  OT Start Time: 0922  OT Stop Time: 0945  OT Total Time (min): 23 min    Billable Minutes:Re-eval 13  Self Care/Home Management 10    CHERYL Ryan  2/9/2019

## 2019-02-09 NOTE — PROGRESS NOTES
ATTENDING NOTE, ONCOLOGY INPATIENT TEAM    Events of last 24 hours noted.  Patient seen and examined, chart reviewed.  Full note to follow by housestaff  Appears comfortable, in NAD, states that she is feeling better today..  Lungs are clear to auscultation.  Abdomen is soft, nontender.  Labs reviewed.    PLAN  Follow electrolytes and CBC daily for now.  Titrate tube feedings as tolerated.  Switch to oral steroids.  DVT prophylaxis.  PT/OT.  Awaiting transfer to the Rehab Unit early next week.  We will follow.  Her multiple questions were answered to her satisfaction.      Ashwin Stewart MD

## 2019-02-09 NOTE — ASSESSMENT & PLAN NOTE
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   - F/u 1/24 skin biopsy.  -MRI left humerus suspicious for myositis    Per rheumatology  - Methylpred 125mg IV bid started, now tapered to 24mg IV bid, transitioned to PO medrol 48mg daily via PEG. Taper per rheumatology  - MTX 15mg subQ qWeekly , now 20mg qWeekly  - IVIG x5 days completed 2/3/19 will require 1x maintenance dose 4 weeks from 2/3 per rheum  - myomarker panel, HMGCR, QG-TB pending  - PFTs as OP  - Quantitative IgA 533, IgG 932, IgM 66  - ID consulted for fast track vacc  - f/u with Dr. Swift and Dr. Campos in Rheumatology at discharge  - DISPO: pending d/c to rehab    Lab Results   Component Value Date     (H) 02/09/2019     (H) 02/08/2019     (H) 02/07/2019     (H) 02/06/2019     (H) 02/05/2019

## 2019-02-09 NOTE — PLAN OF CARE
Problem: Adult Inpatient Plan of Care  Goal: Plan of Care Review  Outcome: Ongoing (interventions implemented as appropriate)  No acute events overnight. Tolerating tube feeds w minimal residual thus far. POC discussed w patient, questions answered. Side rails up x2; call light in place; bed in lowest, locked position; non skid socks on; no evidence of skin breakdown;  remains free of injury. NPO, voids, ambulates. Isosource infusing toward goal rate w/o complication. Denied pain. Sm bloody drainage noted from PEG site post activity. Area cleansed and remained  C/d/i.  Lorazepam x 1 dose @ HS. Instructed to call with any concerns or needs. VSS and afebrile

## 2019-02-09 NOTE — SUBJECTIVE & OBJECTIVE
Interval History: pt had PEG tube placed 2/7.  Pt stated she feels a bit better today since having started the tube feeds. She still has weakness but feels it is improved from yesterday and that she has more energy.  No new rashes, no joint pain or swelling, no abdominal pain or n/v/d.    Current Facility-Administered Medications   Medication Frequency    acetaminophen tablet 650 mg Q4H PRN    ALPRAZolam tablet 0.25 mg TID PRN    amLODIPine tablet 10 mg Daily    dextrose 50% injection 12.5 g PRN    dextrose 50% injection 25 g PRN    enoxaparin injection 40 mg Daily    escitalopram oxalate tablet 10 mg Daily    famotidine tablet 20 mg BID    folic acid tablet 1 mg Daily    glucagon (human recombinant) injection 1 mg PRN    glucose chewable tablet 16 g PRN    glucose chewable tablet 24 g PRN    guaifenesin 100 mg/5 ml syrup 200 mg Q4H    hepatitis B (HEPLISAV-B) 20 mcg/0.5 mL vaccine 0.5 mL vaccine x 1 dose    hydrALAZINE injection 10 mg Q6H PRN    insulin aspart U-100 pen 0-5 Units QID (AC + HS) PRN    methylPREDNISolone sodium succinate injection 24 mg Q12H    morphine injection 2 mg Q6H PRN    ondansetron disintegrating tablet 8 mg Q8H PRN    pneumoc 13-brandin conj-dip cr(PF) (PREVNAR 13 (PF)) 0.5 mL vaccine x 1 dose    polyethylene glycol packet 17 g BID PRN    promethazine tablet 12.5 mg Q6H PRN    sodium chloride 0.65 % nasal spray 1 spray PRN    sodium chloride 0.9% flush 10 mL PRN    sodium chloride 0.9% flush 5 mL PRN     Objective:     Vital Signs (Most Recent):  Temp: 98.8 °F (37.1 °C) (02/09/19 0508)  Pulse: 84 (02/09/19 0508)  Resp: 16 (02/09/19 0508)  BP: (!) 115/59 (02/09/19 0508)  SpO2: 99 % (02/09/19 0508)  O2 Device (Oxygen Therapy): room air (02/09/19 0508) Vital Signs (24h Range):  Temp:  [97.7 °F (36.5 °C)-98.8 °F (37.1 °C)] 98.8 °F (37.1 °C)  Pulse:  [72-86] 84  Resp:  [16-20] 16  SpO2:  [97 %-100 %] 99 %  BP: (108-130)/(59-69) 115/59     Weight: 73.7 kg (162 lb 7.7 oz)  (02/09/19 0400)  Body mass index is 27.04 kg/m².  Body surface area is 1.84 meters squared.      Intake/Output Summary (Last 24 hours) at 2/9/2019 0847  Last data filed at 2/9/2019 0700  Gross per 24 hour   Intake 880 ml   Output 2300 ml   Net -1420 ml       Physical Exam   Constitutional: She is oriented to person, place, and time and well-developed, well-nourished, and in no distress. No distress.   HENT:   Head: Normocephalic and atraumatic.   Right Ear: External ear normal.   Left Ear: External ear normal.   Eyes: Conjunctivae and EOM are normal. Pupils are equal, round, and reactive to light.   Neck: Normal range of motion. Neck supple.   Cardiovascular: Normal rate, regular rhythm, normal heart sounds and intact distal pulses.    Pulmonary/Chest: Effort normal and breath sounds normal. No respiratory distress.   Abdominal: Soft. Bowel sounds are normal.       Right Side Rheumatological Exam     Muscle Strength (0-5 scale):  Neck Flexion:  4.6  Neck Extension: 5  Deltoid:  4  Biceps: 5/5   Triceps:  4.4  : 5/5   Iliopsoas: 4  Quadriceps:  5   Distal Lower Extremity: 5    Left Side Rheumatological Exam     Muscle Strength (0-5 scale):  Neck Flexion:  4.6  Neck Extension: 5  Deltoid:  4  Biceps: 5/5   Triceps:  4.4  :  5/5   Iliopsoas: 4  Quadriceps:  5   Distal Lower Extremity: 5      Neurological: She is alert and oriented to person, place, and time.   Skin: Skin is warm and dry. No rash noted.     Hyperpigmented non raised rash over nape of neck, elbows, knuckles (resemble Gottron's papules), thighs, and ankles -improving       Psychiatric: Mood, memory, affect and judgment normal.   Musculoskeletal: Normal range of motion. She exhibits no edema, tenderness or deformity.         Significant Labs:  Recent Results (from the past 48 hour(s))   Comprehensive Metabolic Panel (CMP)    Collection Time: 02/08/19  3:44 AM   Result Value Ref Range    Sodium 135 (L) 136 - 145 mmol/L    Potassium 4.1 3.5 - 5.1  mmol/L    Chloride 103 95 - 110 mmol/L    CO2 26 23 - 29 mmol/L    Glucose 123 (H) 70 - 110 mg/dL    BUN, Bld 19 6 - 20 mg/dL    Creatinine 0.6 0.5 - 1.4 mg/dL    Calcium 8.5 (L) 8.7 - 10.5 mg/dL    Total Protein 6.1 6.0 - 8.4 g/dL    Albumin 2.5 (L) 3.5 - 5.2 g/dL    Total Bilirubin 0.5 0.1 - 1.0 mg/dL    Alkaline Phosphatase 58 55 - 135 U/L    AST 76 (H) 10 - 40 U/L    ALT 36 10 - 44 U/L    Anion Gap 6 (L) 8 - 16 mmol/L    eGFR if African American >60.0 >60 mL/min/1.73 m^2    eGFR if non African American >60.0 >60 mL/min/1.73 m^2   Magnesium    Collection Time: 02/08/19  3:44 AM   Result Value Ref Range    Magnesium 2.0 1.6 - 2.6 mg/dL   Phosphorus    Collection Time: 02/08/19  3:44 AM   Result Value Ref Range    Phosphorus 3.1 2.7 - 4.5 mg/dL   CK    Collection Time: 02/08/19  3:44 AM   Result Value Ref Range     (H) 20 - 180 U/L   CBC auto differential    Collection Time: 02/08/19  3:44 AM   Result Value Ref Range    WBC 9.23 3.90 - 12.70 K/uL    RBC 2.59 (L) 4.00 - 5.40 M/uL    Hemoglobin 7.3 (L) 12.0 - 16.0 g/dL    Hematocrit 23.4 (L) 37.0 - 48.5 %    MCV 90 82 - 98 fL    MCH 28.2 27.0 - 31.0 pg    MCHC 31.2 (L) 32.0 - 36.0 g/dL    RDW 19.9 (H) 11.5 - 14.5 %    Platelets 165 150 - 350 K/uL    MPV 10.5 9.2 - 12.9 fL    Immature Granulocytes 0.4 0.0 - 0.5 %    Gran # (ANC) 8.4 (H) 1.8 - 7.7 K/uL    Immature Grans (Abs) 0.04 0.00 - 0.04 K/uL    Lymph # 0.4 (L) 1.0 - 4.8 K/uL    Mono # 0.4 0.3 - 1.0 K/uL    Eos # 0.0 0.0 - 0.5 K/uL    Baso # 0.00 0.00 - 0.20 K/uL    nRBC 0 0 /100 WBC    Gran% 90.9 (H) 38.0 - 73.0 %    Lymph% 4.1 (L) 18.0 - 48.0 %    Mono% 4.6 4.0 - 15.0 %    Eosinophil% 0.0 0.0 - 8.0 %    Basophil% 0.0 0.0 - 1.9 %    Differential Method Automated    Aldolase    Collection Time: 02/08/19  3:44 AM   Result Value Ref Range    Aldolase 5.4 1.2 - 7.6 U/L   Comprehensive Metabolic Panel (CMP)    Collection Time: 02/09/19  4:40 AM   Result Value Ref Range    Sodium 135 (L) 136 - 145 mmol/L     Potassium 3.8 3.5 - 5.1 mmol/L    Chloride 104 95 - 110 mmol/L    CO2 27 23 - 29 mmol/L    Glucose 120 (H) 70 - 110 mg/dL    BUN, Bld 19 6 - 20 mg/dL    Creatinine 0.5 0.5 - 1.4 mg/dL    Calcium 8.8 8.7 - 10.5 mg/dL    Total Protein 6.4 6.0 - 8.4 g/dL    Albumin 2.7 (L) 3.5 - 5.2 g/dL    Total Bilirubin 0.5 0.1 - 1.0 mg/dL    Alkaline Phosphatase 59 55 - 135 U/L    AST 79 (H) 10 - 40 U/L    ALT 46 (H) 10 - 44 U/L    Anion Gap 4 (L) 8 - 16 mmol/L    eGFR if African American >60.0 >60 mL/min/1.73 m^2    eGFR if non African American >60.0 >60 mL/min/1.73 m^2   Magnesium    Collection Time: 02/09/19  4:40 AM   Result Value Ref Range    Magnesium 2.0 1.6 - 2.6 mg/dL   Phosphorus    Collection Time: 02/09/19  4:40 AM   Result Value Ref Range    Phosphorus 2.7 2.7 - 4.5 mg/dL   CK    Collection Time: 02/09/19  4:40 AM   Result Value Ref Range     (H) 20 - 180 U/L   CBC auto differential    Collection Time: 02/09/19  4:40 AM   Result Value Ref Range    WBC 4.38 3.90 - 12.70 K/uL    RBC 2.67 (L) 4.00 - 5.40 M/uL    Hemoglobin 7.5 (L) 12.0 - 16.0 g/dL    Hematocrit 23.9 (L) 37.0 - 48.5 %    MCV 90 82 - 98 fL    MCH 28.1 27.0 - 31.0 pg    MCHC 31.4 (L) 32.0 - 36.0 g/dL    RDW 19.9 (H) 11.5 - 14.5 %    Platelets 167 150 - 350 K/uL    MPV 10.6 9.2 - 12.9 fL    Immature Granulocytes 0.5 0.0 - 0.5 %    Gran # (ANC) 3.7 1.8 - 7.7 K/uL    Immature Grans (Abs) 0.02 0.00 - 0.04 K/uL    Lymph # 0.4 (L) 1.0 - 4.8 K/uL    Mono # 0.3 0.3 - 1.0 K/uL    Eos # 0.0 0.0 - 0.5 K/uL    Baso # 0.00 0.00 - 0.20 K/uL    nRBC 0 0 /100 WBC    Gran% 84.4 (H) 38.0 - 73.0 %    Lymph% 8.7 (L) 18.0 - 48.0 %    Mono% 6.4 4.0 - 15.0 %    Eosinophil% 0.0 0.0 - 8.0 %    Basophil% 0.0 0.0 - 1.9 %    Differential Method Automated       Ref. Range 1/31/2019 04:00 2/1/2019 04:12 2/2/2019 04:44 2/3/2019 03:29 2/4/2019 04:06 2/5/2019 04:55 2/6/2019 04:09 2/7/2019 03:50   CPK Latest Ref Range: 20 - 180 U/L 1147 (H) 827 (H) 662 (H) 517 (H) 465 (H) 434 (H) 448  (H) 357 (H)          Ref. Range 1/24/2019 03:40 1/25/2019 04:20 1/26/2019 04:00 1/27/2019 04:21 1/28/2019 04:00   Aldolase Latest Ref Range: 1.2 - 7.6 U/L 12.7 (H) 14.2 (H) 13.7 (H) 13.1 (H) 15.4 (H)      Ref. Range 1/22/2019 22:24   Sed Rate Latest Ref Range: 0 - 36 mm/Hr 50 (H)        Ref. Range 1/22/2019 22:24   CRP Latest Ref Range: 0.0 - 8.2 mg/L 31.1 (H)      Quant TB 1/30/19: indeterminate       Ref. Range 1/22/2019 22:24 1/25/2019 17:33   DARCY HEP-2 Titer Unknown Positive 1:640 Sp...    Anti-SSA Antibody Latest Ref Range: 0.00 - 19.99 EU 0.49    Anti-SSA Interpretation Latest Ref Range: Negative  Negative    Anti-SSB Antibody Latest Ref Range: 0.00 - 19.99 EU 0.11    Anti-SSB Interpretation Latest Ref Range: Negative  Negative    ds DNA Ab Latest Ref Range: Negative 1:10  Negative 1:10    Anti Sm Antibody Latest Ref Range: 0.00 - 19.99 EU 0.58    Anti-Sm Interpretation Latest Ref Range: Negative  Negative    Anti Sm/RNP Antibody Latest Ref Range: 0.00 - 19.99 EU 0.62    Anti-Sm/RNP Interpretation Latest Ref Range: Negative  Negative    DARCY Screen Latest Ref Range: Negative <1:160  Positive (A)    Complement (C-3) Latest Ref Range: 50 - 180 mg/dL  106   Complement (C-4) Latest Ref Range: 11 - 44 mg/dL  29   Meg-1 Autoantibodies Latest Ref Range: <1.0 Index <1.00       Ref. Range 1/23/2019 02:55 1/23/2019 02:56   Specimen UA Unknown Urine, Clean Catch    Color, UA Latest Ref Range: Yellow, Straw, [place]  Colorless (A)    pH, UA Latest Ref Range: 5.0 - 8.0  6.0    Specific Gravity, UA Latest Ref Range: 1.005 - 1.030  1.005    Appearance, UA Latest Ref Range: Clear  Clear    Protein, UA Latest Ref Range: Negative  Negative    Glucose, UA Latest Ref Range: Negative  Negative    Ketones, UA Latest Ref Range: Negative  Negative    Occult Blood UA Latest Ref Range: Negative  Negative    Nitrite, UA Latest Ref Range: Negative  Negative    Bilirubin (UA) Latest Ref Range: Negative  Negative    Leukocytes, UA Latest Ref  Range: Negative  Trace (A)    RBC, UA Latest Ref Range: 0 - 4 /hpf  2   WBC, UA Latest Ref Range: 0 - 5 /hpf  3   Squam Epithel, UA Latest Units: /hpf  0   Microscopic Comment Unknown  SEE COMMENT     Meg-1: Negative    Significant Imaging:  MRI Humerus w/ and w/o contrast:  Impression       1. Diffuse edema and enhancement of the visualized left upper extremity musculature, most pronounced proximally, most notably of the deltoid and biceps musculature as detailed above.  Findings are nonspecific although could relate to a nonspecific myelitis particularly in light of patient's reported history.  Clinical correlation with appropriate history and lab markers advised.  2. No evidence of abscess or osteomyelitis.  This report was flagged in Epic as abnormal.     NM PET CT 12/27/18:  Impression       See above    Significant improvement in all the previously seen lymph nodes involving the left lower neck, supraclavicular region, axillary and retropectoral region.    Index left retropectoral SUV max 3.57, previously 27.2.     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 Biopsied.                        - Small hiatal hernia.                        - Normal stomach.                        - Normal examined duodenum.  Recommendation:       - Return patient to hospital to for ongoing care.                        - Await pathology results.                        - Use a proton pump inhibitor PO BID.                        - The findings and recommendations were discussed                         with the designated responsible adult.                        - Repeat upper endoscopy in 8 weeks to check                         healing.

## 2019-02-09 NOTE — PLAN OF CARE
Problem: Adult Inpatient Plan of Care  Goal: Plan of Care Review  Outcome: Ongoing (interventions implemented as appropriate)  Side rails up x2; call bell in place; bed in lowest, locked position; skid proof socks on; no evidence of skin breakdown; care plan explained to patient; pt remains free of injury. NPO, voids, ambulates, IVF infusing, pt denies pain or n/v .NPO, PEG secured, tube taped and isosource infusing 10cc/hr. Pt denied pain, n/v or any other discomfort. pt instructed to call with any concerns or needs, basic education given or PEG care. VSS and afebrile

## 2019-02-09 NOTE — PROGRESS NOTES
[facility]  Hematology/Oncology  Progress Note    Patient Name: Ermelinda Verde  Admission Date: 1/22/2019  Hospital Length of Stay: 18 days  Code Status: Full Code     Subjective:     HPI:  Ms Verde is a 59 yo F with a prior diagnosis of L sided breast cancer, s/p 3 cycles of nab-paclitaxel and atezolizumab who presents from clinic with a roughly week long, multi-day history of proximal muscle weakness in the shoulder girdle muscles, bilateral and associated with mild tenderness. She reports generalized weakness, but strength impairment with the use of her upper extremities more than lower extremities, and her ADLs are intact. Her last PET scan in December 2018 showed almost complete resolution of her adenopathy, and she had been complaining of a rash, which had been attributed to Nab paclitaxel. Last week, 1/15, she had a CT neck/ since there was concern for facial swelling per symptoms, and the CT showed lymphadenopathy without airway or vascular compromise. 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 Most recently, the patient had been on 60mg of prednisone with a taper and had noted swelling, proximal weakness. She also notes some progressive swallowing difficulty, but attributes this to candidal thrush for which she takes nystatin scheduled. She reports poor PO intake preceding admission, dark, mario colored urine, but denies fevers or joint pain.      Onc History per Dr. Reyna:   She developed a palpable abnormality in her left breast in January 2017 which she noted on self-examination.  A diagnostic mammogram on January 19 showed a greater than 1 cm nodule in the upper outer portion of left breast.  By ultrasound this was lobulated and hypoechoic measuring 1.75 x 1.51 x 1.96 cm.     On January 24,  2017 a core needle biopsy was performed which showed infiltrating ductal carcinoma, high grade.  The tumor was ER negative, WA negative, and HER-2 negative.  A follow-up ultrasound on December 6 showed 2.5 x 2.2 x 1.5 cm left breast mass.  There was no abnormality noted in the left axilla.     She underwent sentinel lymph node biopsy on February 22.  That showed 4 negative lymph nodes.     She had 4 cycles of  Wilbur-adjuvantTaxotere and Cytoxan completed  on 5/9/17.     On June 26 she underwent left mastectomy.  That revealed 2 foci of invasive high-grade carcinoma measuring 14 mm and 1.5 mm.  Margins were negative.     She completed 4 cycles of adjuvant Adriamycin on September 12, 2017.     In October, she developed some left supraclavicular lymphadenopathy which turned out to be recurrence.     A fine-needle aspirate of the lymph node was performed on October 19th.  That showed metastatic carcinoma consistent with breast primary which was ER negative, WA negative and HER 2-negative.           Interval History: NAEO, strength slowly improving as CPK downtrends on steroids. tolerating tube feeds well. Dispo pending rehab placement early this week    Oncology Treatment Plan:   OP BREAST DOCETAXEL Q3W    Medications:  Continuous Infusions:    Scheduled Meds:   amLODIPine  10 mg Per G Tube Daily    enoxaparin  40 mg Subcutaneous Daily    escitalopram oxalate  10 mg Per G Tube Daily    famotidine  20 mg Per G Tube BID    folic acid  1 mg Per G Tube Daily    guaifenesin 100 mg/5 ml  200 mg Per G Tube Q4H    [START ON 2/10/2019] methylPREDNISolone  48 mg Oral Daily     PRN Meds:acetaminophen, ALPRAZolam, dextrose 50%, dextrose 50%, glucagon (human recombinant), glucose, glucose, hepatitis B, hydrALAZINE, insulin aspart U-100, morphine, ondansetron, pneumoc 13-brandin conj-dip cr(PF), polyethylene glycol, promethazine, sodium chloride, sodium chloride 0.9%, sodium chloride 0.9%     Review of Systems   Constitutional:  Positive for fatigue. Negative for activity change, appetite change, chills, diaphoresis, fever and unexpected weight change.   HENT: Positive for facial swelling and trouble swallowing. Negative for congestion, ear discharge, hearing loss, postnasal drip, sinus pressure, sneezing, sore throat and tinnitus.    Eyes: Negative for photophobia, pain and redness.   Respiratory: Negative for apnea, cough, choking, chest tightness, shortness of breath and stridor.    Cardiovascular: Negative for chest pain, palpitations and leg swelling.   Gastrointestinal: Negative for abdominal pain, anal bleeding, constipation, diarrhea, nausea and rectal pain.   Endocrine: Negative for polyuria.   Genitourinary: Negative for dysuria and hematuria.   Musculoskeletal: Negative for arthralgias, back pain, gait problem, joint swelling, myalgias, neck pain and neck stiffness.   Skin: Positive for rash. Negative for color change and pallor.   Allergic/Immunologic: Negative for immunocompromised state.   Neurological: Positive for weakness. Negative for dizziness, seizures and numbness.   Hematological: Positive for adenopathy. Does not bruise/bleed easily.   Psychiatric/Behavioral: Negative for agitation and behavioral problems.     Objective:     Vital Signs (Most Recent):  Temp: 98.4 °F (36.9 °C) (02/09/19 1230)  Pulse: 93 (02/09/19 1230)  Resp: 18 (02/09/19 1230)  BP: 111/64 (02/09/19 1230)  SpO2: 98 % (02/09/19 1230) Vital Signs (24h Range):  Temp:  [98 °F (36.7 °C)-98.8 °F (37.1 °C)] 98.4 °F (36.9 °C)  Pulse:  [72-93] 93  Resp:  [16-18] 18  SpO2:  [97 %-100 %] 98 %  BP: (108-130)/(59-69) 111/64     Weight: 73.7 kg (162 lb 7.7 oz)  Body mass index is 27.04 kg/m².  Body surface area is 1.84 meters squared.      Intake/Output Summary (Last 24 hours) at 2/9/2019 1543  Last data filed at 2/9/2019 0700  Gross per 24 hour   Intake 680 ml   Output 2000 ml   Net -1320 ml       Physical Exam   Constitutional: She is oriented to person, place,  and time. She appears well-developed and well-nourished. No distress.   HENT:   Head: Atraumatic.   Nose: Nose normal.   Mouth/Throat: No oropharyngeal exudate.   Eyes: Conjunctivae and EOM are normal. Pupils are equal, round, and reactive to light. Right eye exhibits no discharge. Left eye exhibits no discharge. No scleral icterus.   Neck: Normal range of motion. Neck supple. No tracheal deviation present.   Cardiovascular: Normal rate, regular rhythm and intact distal pulses. Exam reveals no gallop and no friction rub.   Pulmonary/Chest: Effort normal and breath sounds normal. No respiratory distress. She has no wheezes. She has no rales. She exhibits no tenderness.   Abdominal: Soft. Bowel sounds are normal. She exhibits no distension and no mass. There is no tenderness. There is no rebound and no guarding.   PEG tube in situ   Musculoskeletal: Normal range of motion. She exhibits no tenderness or deformity.   Neurological: She is alert and oriented to person, place, and time. No cranial nerve deficit or sensory deficit.     4.5/5 shoulder strength on shoulder abduction; improving  5/5 flexion and extension preserved at elbow   Skin: Skin is warm and dry. Capillary refill takes less than 2 seconds. No rash noted. She is not diaphoretic. No erythema. No pallor.   Psychiatric: She has a normal mood and affect.   Vitals reviewed.      Significant Labs:   BMP:   Recent Labs   Lab 02/08/19  0344 02/09/19  0440   * 120*   * 135*   K 4.1 3.8    104   CO2 26 27   BUN 19 19   CREATININE 0.6 0.5   CALCIUM 8.5* 8.8   MG 2.0 2.0   , CBC:   Recent Labs   Lab 02/08/19  0344 02/09/19  0440   WBC 9.23 4.38   HGB 7.3* 7.5*   HCT 23.4* 23.9*    167   , CMP:   Recent Labs   Lab 02/08/19 0344 02/09/19  0440   * 135*   K 4.1 3.8    104   CO2 26 27   * 120*   BUN 19 19   CREATININE 0.6 0.5   CALCIUM 8.5* 8.8   PROT 6.1 6.4   ALBUMIN 2.5* 2.7*   BILITOT 0.5 0.5   ALKPHOS 58 59   AST 76* 79*    ALT 36 46*   ANIONGAP 6* 4*   EGFRNONAA >60.0 >60.0   , Coagulation: No results for input(s): PT, INR, APTT in the last 48 hours., Haptoglobin: No results for input(s): HAPTOGLOBIN in the last 48 hours., Immunology: No results for input(s): SPEP, ERVIN, DARCY, FREELAMBDALI in the last 48 hours., LDH: No results for input(s): LDHCSF, BFSOURCE in the last 48 hours., LFTs:   Recent Labs   Lab 02/08/19  0344 02/09/19  0440   ALT 36 46*   AST 76* 79*   ALKPHOS 58 59   BILITOT 0.5 0.5   PROT 6.1 6.4   ALBUMIN 2.5* 2.7*   , Reticulocytes: No results for input(s): RETIC in the last 48 hours., Tumor Markers: No results for input(s): PSA, CEA, , AFPTM, [id number],  in the last 48 hours.    Invalid input(s): ALGTM, Uric Acid No results for input(s): URICACID in the last 48 hours., Urine Studies: No results for input(s): COLORU, APPEARANCEUA, PHUR, SPECGRAV, PROTEINUA, GLUCUA, KETONESU, BILIRUBINUA, OCCULTUA, NITRITE, UROBILINOGEN, LEUKOCYTESUR, RBCUA, WBCUA, BACTERIA, SQUAMEPITHEL, HYALINECASTS in the last 48 hours.    Invalid input(s): WRIGHTSUR and All pertinent labs from the last 24 hours have been reviewed.    Diagnostic Results:  EXAMINATION:  FL MODIFIED BARIUM SWALLOW SPEECH STUDY    CLINICAL HISTORY:  repeat to determine diet initiation.;    TECHNIQUE:  Modified Barium Swallow Study. Video fluoroscopic swallowing examination was performed in conjunction with the speech pathology department.  Various liquid and solid food substances were used to assess swallowing.    Fluoroscopy Time: 1.9 minutes    COMPARISON:  Fluoroscopic modified barium swallow 01/28/2019      Impression       Transit: Delayed oral transit time. Significant vallecular and pyriform recess residual/layering.    Penetration/Aspiration: Aspiration following the consumption of liquids.  Significant stasis of puree food with high risk of aspiration.    Other: N/A.    See speech pathology report for further details.    Electronically signed by  resident: Latrell Garcia MD  Date: 02/06/2019  Time: 15:43    Electronically signed by: Shay Reddy MD  Date: 02/06/2019         Assessment/Plan:     * Myositis    - DDX dermatomyositis de haylie vs related to immunotherapy  - trend CPK daily; downtrending generally with a slight bump on 01/27  -restarted fluids 01/26 due to NPO  - myositis labs suspicious for myosits; rheum on board.   - F/u 1/24 skin biopsy.  -MRI left humerus suspicious for myositis    Per rheumatology  - Methylpred 125mg IV bid started, now tapered to 24mg IV bid, transitioned to PO medrol 48mg daily via PEG. Taper per rheumatology  - MTX 15mg subQ qWeekly , now 20mg qWeekly  - IVIG x5 days completed 2/3/19 will require 1x maintenance dose 4 weeks from 2/3 per rheum  - myomarker panel, HMGCR, QG-TB pending  - PFTs as OP  - Quantitative IgA 533, IgG 932, IgM 66  - ID consulted for fast track vacc  - f/u with Dr. Swift and Dr. Campos in Rheumatology at discharge  - DISPO: pending d/c to rehab    Lab Results   Component Value Date     (H) 02/09/2019     (H) 02/08/2019     (H) 02/07/2019     (H) 02/06/2019     (H) 02/05/2019          Hypokalemia    Replete prn     Dysphagia    Patient is experiencing difficulty swallowing that has been increasing in severity over last couple of days to the point where patient did not feel as if she could swallow.  As of 01/26, SLP evaluated patient and determined that she should be NPO except for medications until a modified barium swallow could be performed. Possibly candidal esophagitis; patient has oral thrush.    Plan:  - High aspiration risk on modified barium study, strict NPO  - NGT in situ, tube feeds initiated per dietary consult  - Switched to IV meds where possible  - Fluconazole IVPB 200mg q24hr (now d/c'd)  - GI consulted, EGD resulted w/ Grade C erosive esophagitis, no candidal infxn identified. Bx obtained, GI recs start PPI bid  - Failed swallow study after NGT  removed  - PEG completed 2/7  - Dietary/nutrition consult for tube feeds and education  - Tube feedings started 2/8, tolerating well. Continue to titrate  - Medications per PEG tube  - Can reverse PEG when swallowing is passable  - SLP rehab services     Swelling of upper arm    -B/L U/S negative for DVTs  -appears less edematous than day prior  -continue to monitor     Dermatitis    - Skin biopsy from 1/24 pending, appreciate dermatology recs     Candidiasis    Oral thrush  -cont Nystatin swish and swallow. Additionally, added fluconaozle 200 mg Po qday on 01/26 as possibility patient is experiencing esophagitis 2/2 to candida  -appears resolved now s/p fluconazole and nystatin     Rhabdomyolysis    -see myositis. Continue supportive care, Cr/ electrolyte/ CPK monitoring and aggressive hydration. transaminitis on CMP is likely due to skeletal muscle rhabdo, not hepatic     Drug rash    - Unclear if symptoms related to chemotherapy     Pre-diabetes    -check a1c, 0-5u SSI while on steroids, add prandial insulin as warranted.      Adjustment disorder with depressed mood    -cont home SSRI     Vitamin B12 deficiency    -cont home B12 supplementation      Breast cancer of upper-outer quadrant of left female breast    -On January 24, 2017 a core needle biopsy was performed which showed infiltrating ductal carcinoma, high grade.  The tumor was ER negative, AL negative, and HER-2 negative.  -She had 4 cycles of  Wilbur-adjuvantTaxotere and Cytoxan completed  on 5/9/17.  -On June 26 she underwent left mastectomy.  That revealed 2 foci of invasive high-grade carcinoma measuring 14 mm and 1.5 mm.  Margins were negative.  -She completed 4 cycles of adjuvant Adriamycin on September 12, 2017.  -  A fine-needle aspirate of the lymph node was performed on October 19th.  That showed metastatic carcinoma consistent with breast primary which was ER negative, AL negative and HER 2-negative.    -Per rheumatology: hold paclitaxel agent  and atezolizumb at this time - both can cause myositis      Hypertension    -on home amlodipine-benazepril; will hold ACEi for now and observe renal function with CPK elevation in rhabdo.     Plan:  - Amlodipine 10 mg qday on admission  - 1/28 Pt now strict NPO due to failed swallow study, now on hydralazine 10mg IV q8h WCTM and adjust PRN              Noah Patel MD  Hematology/Oncology  [facility]          ATTENDING NOTE, ONCOLOGY INPATIENT TEAM    As above; please refer to my note of same day for our assessment and plan    Ashwin Stewart MD

## 2019-02-09 NOTE — ASSESSMENT & PLAN NOTE
Patient is experiencing difficulty swallowing that has been increasing in severity over last couple of days to the point where patient did not feel as if she could swallow.  As of 01/26, SLP evaluated patient and determined that she should be NPO except for medications until a modified barium swallow could be performed. Possibly candidal esophagitis; patient has oral thrush.    Plan:  - High aspiration risk on modified barium study, strict NPO  - NGT in situ, tube feeds initiated per dietary consult  - Switched to IV meds where possible  - Fluconazole IVPB 200mg q24hr (now d/c'd)  - GI consulted, EGD resulted w/ Grade C erosive esophagitis, no candidal infxn identified. Bx obtained, GI recs start PPI bid  - Failed swallow study after NGT removed  - PEG completed 2/7  - Dietary/nutrition consult for tube feeds and education  - Tube feedings started 2/8, tolerating well. Continue to titrate  - Medications per PEG tube  - Can reverse PEG when swallowing is passable  - SLP rehab services

## 2019-02-09 NOTE — PROGRESS NOTES
[facility]  Rheumatology  Progress Note    Patient Name: Ermelinda Verde  MRN: [id number]  Admission Date: 1/22/2019  Hospital Length of Stay: 18 days  Code Status: Full Code   Attending Provider: Ashwin Stewart MD  Primary Care Physician: Reta Weeks MD  Principal Problem: Myositis    Subjective:     HPI: 59yo F with history of L sided infiltrating ductal carcinoma of the L breast (dx on Jan 2017), HTN, depression, and gastritis was send from hem/onc clinic for evaluation of possible inflammatory myositis.     L breast cancer s/p completion of 4 cycles of demi-adjuvant taxotere and cytoxan (completed on 5/9/17) and mastectomy (6/26/17) and then 4 cycles of adjuvant Adriamycin (completed 9/12/17). In 10/2017 - patient developed L supraclavicular lymphadenopathy which FNA confirmed as reoccurrence of previously treated breast cancer.     Patient started on Atelizumab/Abraxane on 11/7/18. Per chart review, patient noticed rashes on the dorsum of her hands on 11/11/18. Seen in urgent care and given topical steroid cream. Then received two more infusions on Atelizumab/Abraxane on 11/21/18 and 12/5/18. Started to notice puffiness around the eyes on 12/11/18. Received another Abraxane infusion on 12/12/18 but this time with hydrocortisone 50 mg which helped reduce the swelling around the eyes. Developed swelling of the face again on 12/15/18. Next infusion of Abraxane done on 12/19/18 with Solucortef and Atelizumab held. Abraxane given again on 1/3/19 with no IV steroid. Patient developed swelling of the face on 1/4/19 and went to urgent care and given short course of prednisone 20 mg BID. On 1/15/19, patient seen in hem/onc clinic with c/o of pressure and tightness around neck. CT scan of chest and neck did not reveal any vascular compression. Started on prednisone 60 mg with taper. On 1/22/18 patient with c/o proximal muscle weakness. CPK found to be elevated around 4k. Patient admitted and given  solumedrol 80 mg IV on 1/22/18. Last infusion of Atelizumab on 12/19/18. Last infusion of Abraxane on 1/3/19. Given Solumedrol 1g x 1 on 1/23/18. Seen by Dermatology on 1/24/18 and biopsy done of skin rash. Given distribution of rashes, there was concern for dermatomyositis. Patient started on Solumedrol 125 mg IV BID at this time.     Denies any family history of autoimmune diseases.    No smoking, EtOH, recreational drug usage.    Denies any photosensitivity, joint swelling, unintentional weigh loss, abdominal pain, night sweats, CP, SOB.  +oral thrush.     Interval History: pt had PEG tube placed 2/7.  Pt stated she feels a bit better today since having started the tube feeds. She still has weakness but feels it is improved from yesterday and that she has more energy.  No new rashes, no joint pain or swelling, no abdominal pain or n/v/d.    Current Facility-Administered Medications   Medication Frequency    acetaminophen tablet 650 mg Q4H PRN    ALPRAZolam tablet 0.25 mg TID PRN    amLODIPine tablet 10 mg Daily    dextrose 50% injection 12.5 g PRN    dextrose 50% injection 25 g PRN    enoxaparin injection 40 mg Daily    escitalopram oxalate tablet 10 mg Daily    famotidine tablet 20 mg BID    folic acid tablet 1 mg Daily    glucagon (human recombinant) injection 1 mg PRN    glucose chewable tablet 16 g PRN    glucose chewable tablet 24 g PRN    guaifenesin 100 mg/5 ml syrup 200 mg Q4H    hepatitis B (HEPLISAV-B) 20 mcg/0.5 mL vaccine 0.5 mL vaccine x 1 dose    hydrALAZINE injection 10 mg Q6H PRN    insulin aspart U-100 pen 0-5 Units QID (AC + HS) PRN    methylPREDNISolone sodium succinate injection 24 mg Q12H    morphine injection 2 mg Q6H PRN    ondansetron disintegrating tablet 8 mg Q8H PRN    pneumoc 13-brandin conj-dip cr(PF) (PREVNAR 13 (PF)) 0.5 mL vaccine x 1 dose    polyethylene glycol packet 17 g BID PRN    promethazine tablet 12.5 mg Q6H PRN    sodium chloride 0.65 % nasal spray 1  spray PRN    sodium chloride 0.9% flush 10 mL PRN    sodium chloride 0.9% flush 5 mL PRN     Objective:     Vital Signs (Most Recent):  Temp: 98.8 °F (37.1 °C) (02/09/19 0508)  Pulse: 84 (02/09/19 0508)  Resp: 16 (02/09/19 0508)  BP: (!) 115/59 (02/09/19 0508)  SpO2: 99 % (02/09/19 0508)  O2 Device (Oxygen Therapy): room air (02/09/19 0508) Vital Signs (24h Range):  Temp:  [97.7 °F (36.5 °C)-98.8 °F (37.1 °C)] 98.8 °F (37.1 °C)  Pulse:  [72-86] 84  Resp:  [16-20] 16  SpO2:  [97 %-100 %] 99 %  BP: (108-130)/(59-69) 115/59     Weight: 73.7 kg (162 lb 7.7 oz) (02/09/19 0400)  Body mass index is 27.04 kg/m².  Body surface area is 1.84 meters squared.      Intake/Output Summary (Last 24 hours) at 2/9/2019 0847  Last data filed at 2/9/2019 0700  Gross per 24 hour   Intake 880 ml   Output 2300 ml   Net -1420 ml       Physical Exam   Constitutional: She is oriented to person, place, and time and well-developed, well-nourished, and in no distress. No distress.   HENT:   Head: Normocephalic and atraumatic.   Right Ear: External ear normal.   Left Ear: External ear normal.   Eyes: Conjunctivae and EOM are normal. Pupils are equal, round, and reactive to light.   Neck: Normal range of motion. Neck supple.   Cardiovascular: Normal rate, regular rhythm, normal heart sounds and intact distal pulses.    Pulmonary/Chest: Effort normal and breath sounds normal. No respiratory distress.   Abdominal: Soft. Bowel sounds are normal.       Right Side Rheumatological Exam     Muscle Strength (0-5 scale):  Neck Flexion:  4.6  Neck Extension: 5  Deltoid:  4  Biceps: 5/5   Triceps:  4.4  : 5/5   Iliopsoas: 4  Quadriceps:  5   Distal Lower Extremity: 5    Left Side Rheumatological Exam     Muscle Strength (0-5 scale):  Neck Flexion:  4.6  Neck Extension: 5  Deltoid:  4  Biceps: 5/5   Triceps:  4.4  :  5/5   Iliopsoas: 4  Quadriceps:  5   Distal Lower Extremity: 5      Neurological: She is alert and oriented to person, place, and  time.   Skin: Skin is warm and dry. No rash noted.     Hyperpigmented non raised rash over nape of neck, elbows, knuckles (resemble Gottron's papules), thighs, and ankles -improving       Psychiatric: Mood, memory, affect and judgment normal.   Musculoskeletal: Normal range of motion. She exhibits no edema, tenderness or deformity.         Significant Labs:  Recent Results (from the past 48 hour(s))   Comprehensive Metabolic Panel (CMP)    Collection Time: 02/08/19  3:44 AM   Result Value Ref Range    Sodium 135 (L) 136 - 145 mmol/L    Potassium 4.1 3.5 - 5.1 mmol/L    Chloride 103 95 - 110 mmol/L    CO2 26 23 - 29 mmol/L    Glucose 123 (H) 70 - 110 mg/dL    BUN, Bld 19 6 - 20 mg/dL    Creatinine 0.6 0.5 - 1.4 mg/dL    Calcium 8.5 (L) 8.7 - 10.5 mg/dL    Total Protein 6.1 6.0 - 8.4 g/dL    Albumin 2.5 (L) 3.5 - 5.2 g/dL    Total Bilirubin 0.5 0.1 - 1.0 mg/dL    Alkaline Phosphatase 58 55 - 135 U/L    AST 76 (H) 10 - 40 U/L    ALT 36 10 - 44 U/L    Anion Gap 6 (L) 8 - 16 mmol/L    eGFR if African American >60.0 >60 mL/min/1.73 m^2    eGFR if non African American >60.0 >60 mL/min/1.73 m^2   Magnesium    Collection Time: 02/08/19  3:44 AM   Result Value Ref Range    Magnesium 2.0 1.6 - 2.6 mg/dL   Phosphorus    Collection Time: 02/08/19  3:44 AM   Result Value Ref Range    Phosphorus 3.1 2.7 - 4.5 mg/dL   CK    Collection Time: 02/08/19  3:44 AM   Result Value Ref Range     (H) 20 - 180 U/L   CBC auto differential    Collection Time: 02/08/19  3:44 AM   Result Value Ref Range    WBC 9.23 3.90 - 12.70 K/uL    RBC 2.59 (L) 4.00 - 5.40 M/uL    Hemoglobin 7.3 (L) 12.0 - 16.0 g/dL    Hematocrit 23.4 (L) 37.0 - 48.5 %    MCV 90 82 - 98 fL    MCH 28.2 27.0 - 31.0 pg    MCHC 31.2 (L) 32.0 - 36.0 g/dL    RDW 19.9 (H) 11.5 - 14.5 %    Platelets 165 150 - 350 K/uL    MPV 10.5 9.2 - 12.9 fL    Immature Granulocytes 0.4 0.0 - 0.5 %    Gran # (ANC) 8.4 (H) 1.8 - 7.7 K/uL    Immature Grans (Abs) 0.04 0.00 - 0.04 K/uL    Lymph  # 0.4 (L) 1.0 - 4.8 K/uL    Mono # 0.4 0.3 - 1.0 K/uL    Eos # 0.0 0.0 - 0.5 K/uL    Baso # 0.00 0.00 - 0.20 K/uL    nRBC 0 0 /100 WBC    Gran% 90.9 (H) 38.0 - 73.0 %    Lymph% 4.1 (L) 18.0 - 48.0 %    Mono% 4.6 4.0 - 15.0 %    Eosinophil% 0.0 0.0 - 8.0 %    Basophil% 0.0 0.0 - 1.9 %    Differential Method Automated    Aldolase    Collection Time: 02/08/19  3:44 AM   Result Value Ref Range    Aldolase 5.4 1.2 - 7.6 U/L   Comprehensive Metabolic Panel (CMP)    Collection Time: 02/09/19  4:40 AM   Result Value Ref Range    Sodium 135 (L) 136 - 145 mmol/L    Potassium 3.8 3.5 - 5.1 mmol/L    Chloride 104 95 - 110 mmol/L    CO2 27 23 - 29 mmol/L    Glucose 120 (H) 70 - 110 mg/dL    BUN, Bld 19 6 - 20 mg/dL    Creatinine 0.5 0.5 - 1.4 mg/dL    Calcium 8.8 8.7 - 10.5 mg/dL    Total Protein 6.4 6.0 - 8.4 g/dL    Albumin 2.7 (L) 3.5 - 5.2 g/dL    Total Bilirubin 0.5 0.1 - 1.0 mg/dL    Alkaline Phosphatase 59 55 - 135 U/L    AST 79 (H) 10 - 40 U/L    ALT 46 (H) 10 - 44 U/L    Anion Gap 4 (L) 8 - 16 mmol/L    eGFR if African American >60.0 >60 mL/min/1.73 m^2    eGFR if non African American >60.0 >60 mL/min/1.73 m^2   Magnesium    Collection Time: 02/09/19  4:40 AM   Result Value Ref Range    Magnesium 2.0 1.6 - 2.6 mg/dL   Phosphorus    Collection Time: 02/09/19  4:40 AM   Result Value Ref Range    Phosphorus 2.7 2.7 - 4.5 mg/dL   CK    Collection Time: 02/09/19  4:40 AM   Result Value Ref Range     (H) 20 - 180 U/L   CBC auto differential    Collection Time: 02/09/19  4:40 AM   Result Value Ref Range    WBC 4.38 3.90 - 12.70 K/uL    RBC 2.67 (L) 4.00 - 5.40 M/uL    Hemoglobin 7.5 (L) 12.0 - 16.0 g/dL    Hematocrit 23.9 (L) 37.0 - 48.5 %    MCV 90 82 - 98 fL    MCH 28.1 27.0 - 31.0 pg    MCHC 31.4 (L) 32.0 - 36.0 g/dL    RDW 19.9 (H) 11.5 - 14.5 %    Platelets 167 150 - 350 K/uL    MPV 10.6 9.2 - 12.9 fL    Immature Granulocytes 0.5 0.0 - 0.5 %    Gran # (ANC) 3.7 1.8 - 7.7 K/uL    Immature Grans (Abs) 0.02 0.00 -  0.04 K/uL    Lymph # 0.4 (L) 1.0 - 4.8 K/uL    Mono # 0.3 0.3 - 1.0 K/uL    Eos # 0.0 0.0 - 0.5 K/uL    Baso # 0.00 0.00 - 0.20 K/uL    nRBC 0 0 /100 WBC    Gran% 84.4 (H) 38.0 - 73.0 %    Lymph% 8.7 (L) 18.0 - 48.0 %    Mono% 6.4 4.0 - 15.0 %    Eosinophil% 0.0 0.0 - 8.0 %    Basophil% 0.0 0.0 - 1.9 %    Differential Method Automated       Ref. Range 1/31/2019 04:00 2/1/2019 04:12 2/2/2019 04:44 2/3/2019 03:29 2/4/2019 04:06 2/5/2019 04:55 2/6/2019 04:09 2/7/2019 03:50   CPK Latest Ref Range: 20 - 180 U/L 1147 (H) 827 (H) 662 (H) 517 (H) 465 (H) 434 (H) 448 (H) 357 (H)          Ref. Range 1/24/2019 03:40 1/25/2019 04:20 1/26/2019 04:00 1/27/2019 04:21 1/28/2019 04:00   Aldolase Latest Ref Range: 1.2 - 7.6 U/L 12.7 (H) 14.2 (H) 13.7 (H) 13.1 (H) 15.4 (H)      Ref. Range 1/22/2019 22:24   Sed Rate Latest Ref Range: 0 - 36 mm/Hr 50 (H)        Ref. Range 1/22/2019 22:24   CRP Latest Ref Range: 0.0 - 8.2 mg/L 31.1 (H)      Quant TB 1/30/19: indeterminate       Ref. Range 1/22/2019 22:24 1/25/2019 17:33   DARCY HEP-2 Titer Unknown Positive 1:640 Sp...    Anti-SSA Antibody Latest Ref Range: 0.00 - 19.99 EU 0.49    Anti-SSA Interpretation Latest Ref Range: Negative  Negative    Anti-SSB Antibody Latest Ref Range: 0.00 - 19.99 EU 0.11    Anti-SSB Interpretation Latest Ref Range: Negative  Negative    ds DNA Ab Latest Ref Range: Negative 1:10  Negative 1:10    Anti Sm Antibody Latest Ref Range: 0.00 - 19.99 EU 0.58    Anti-Sm Interpretation Latest Ref Range: Negative  Negative    Anti Sm/RNP Antibody Latest Ref Range: 0.00 - 19.99 EU 0.62    Anti-Sm/RNP Interpretation Latest Ref Range: Negative  Negative    DARCY Screen Latest Ref Range: Negative <1:160  Positive (A)    Complement (C-3) Latest Ref Range: 50 - 180 mg/dL  106   Complement (C-4) Latest Ref Range: 11 - 44 mg/dL  29   Meg-1 Autoantibodies Latest Ref Range: <1.0 Index <1.00       Ref. Range 1/23/2019 02:55 1/23/2019 02:56   Specimen UA Unknown Urine, Clean Catch     Color, UA Latest Ref Range: Yellow, Straw, [place]  Colorless (A)    pH, UA Latest Ref Range: 5.0 - 8.0  6.0    Specific Gravity, UA Latest Ref Range: 1.005 - 1.030  1.005    Appearance, UA Latest Ref Range: Clear  Clear    Protein, UA Latest Ref Range: Negative  Negative    Glucose, UA Latest Ref Range: Negative  Negative    Ketones, UA Latest Ref Range: Negative  Negative    Occult Blood UA Latest Ref Range: Negative  Negative    Nitrite, UA Latest Ref Range: Negative  Negative    Bilirubin (UA) Latest Ref Range: Negative  Negative    Leukocytes, UA Latest Ref Range: Negative  Trace (A)    RBC, UA Latest Ref Range: 0 - 4 /hpf  2   WBC, UA Latest Ref Range: 0 - 5 /hpf  3   Squam Epithel, UA Latest Units: /hpf  0   Microscopic Comment Unknown  SEE COMMENT     Meg-1: Negative    Significant Imaging:  MRI Humerus w/ and w/o contrast:  Impression       1. Diffuse edema and enhancement of the visualized left upper extremity musculature, most pronounced proximally, most notably of the deltoid and biceps musculature as detailed above.  Findings are nonspecific although could relate to a nonspecific myelitis particularly in light of patient's reported history.  Clinical correlation with appropriate history and lab markers advised.  2. No evidence of abscess or osteomyelitis.  This report was flagged in Epic as abnormal.     NM PET CT 12/27/18:  Impression       See above    Significant improvement in all the previously seen lymph nodes involving the left lower neck, supraclavicular region, axillary and retropectoral region.    Index left retropectoral SUV max 3.57, previously 27.2.     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 Biopsied.                        - Small hiatal hernia.                        - Normal stomach.                        - Normal examined duodenum.  Recommendation:       - Return patient  to hospital to for ongoing care.                        - Await pathology results.                        - Use a proton pump inhibitor PO BID.                        - The findings and recommendations were discussed                         with the designated responsible adult.                        - Repeat upper endoscopy in 8 weeks to check                         healing.           Assessment/Plan:     * Myositis    57yo F with history of L sided infiltrating ductal carcinoma of the L breast (dx on Jan 2017), HTN, depression, and gastritis was send from hem/onc clinic for evaluation of possible inflammatory myositis.      Patient started on Atelizumab/Abraxane on 11/7/18. Per chart review, patient noticed rashes on the dorsum of her hands on 11/11/18. Seen in urgent care and given topical steroid cream. Then received two more infusions on Atelizumab/Abraxane on 11/21/18 and 12/5/18. Started to notice puffiness around the eyes on 12/11/18. Received another Abraxane infusion on 12/12/18 but this time with hydrocortisone 50 mg which helped reduce the swelling around the eyes. Developed swelling of the face again on 12/15/18. Next infusion of Abraxane done on 12/19/18 with Solucortef and Atelizumab held. Abraxane given again on 1/3/19 with no IV steroid. Patient developed swelling of the face on 1/4/19 and went to urgent care and given short course of prednisone 20 mg BID. On 1/15/19, patient seen in hem/onc clinic with c/o of pressure and tightness around neck. CT scan of chest and neck did not reveal any vascular compression. Started on prednisone 60 mg with taper. On 1/22/18 patient with c/o proximal muscle weakness. CPK found to be elevated around 4k. Patient admitted and given solumedrol 80 mg IV on 1/22/18. Last infusion of Atelizumab on 12/19/18. Last infusion of Abraxane on 1/3/19. Given Solumedrol 1g x 1 on 1/23/18. Seen by Dermatology on 1/24/18 and biopsy done of skin rash. Given distribution of  "rashes, there was concern for dermatomyositis. Patient started on Solumedrol 125 mg IV BID at that time.  Skin biopsy resulted consistent with dermatomyositis.     Labs: ESR 50, CRP 31.1, CPK 4562 (trending down), Aldolase 13.6.  , ALT 64.  UA normal.  CBC unremarkable.  +DARCY 1:640 speckled with negative profile.  Complements normal. Normal GGT. RPR, HIV negative. Hep B/C negative.  Strongyloides negative. HMGCR antibody negative.  Ferritin elevated at 641, iron on low side at 37, tibc low at 247, transferrin low 167, haptoglobin 153, retic slightly increased at 2.6%, ldh increased at 325. quantTB: indeterminate     Spirometry: normal, normal diffusion capacity. FVC: 81%, DLCO: 114%     Case reports of docetaxel related inflammatory myositis had been documented. "...taxanes have been noted to cause disabling but transient arthralgia and myalgias; it is important to consider the possibility of inflammatory myopathy as a possible complication in patients undergoing treatment with these agents." - A case of docetaxel induced myositis and review of literature. Austin et al 2015.     Case reports in Rheumatology   Case reports of atezolizumb (immunomodulator) cause of myositis  - Lucy et al 2017 "Myositis as an adverse even of immune checkpoint blockade for cancer therapy.  Seminars in Arthritis and Rheumatism      Patient exhibits signs of dermatomyositis (heliotropic rash and gottron's papules).   Dermatomyositis can be associated with malignancy.  MRI of LUE showed edema of the deltoid and biceps.  Exam with proximal muscle weakness: b/l deltoids 4/5, b/l iliopsoas 4.4/5. 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     Failed 2nd swallow eval on 2/6/19. PEG placed " 2/7/2019.     Inpatient treatments so far:  - Solumedrol 80 mg IV x 1 on 1/22/19  - Solumedrol 1 g IV x 1 on 1/23/19.  - Solumedrol 125 mg IV BID since 1/24/19 -->being tapered down.  - IVIG 1/29/19-2/2     Plan:  - chemotherapy can be re-started as it may help treat Dermatomyositis. Would recommend against checkpoint inhibitor, however, as it may have been a trigger for the dermatomyositis  - continue to monitor CPK, aldolase, AST/ALT  - recommend changing to Medrol 48 mg oral daily now that patient has PEG placed.  - completed IVIg 400 mg/kg daily x 5 days. Will need IVIG 1 g/kg on 2/28/19.   - should get colonoscopy as outpatient as part of malignancy work-up   - MTX to 20 mg sc weekly (Tuesdays) with daily folic acid.   - follow up T-spot  - recommend Rehab after discharge to help with strengthening.  - recommend ID consult for vaccinations.  - f/u esophageal biopsy results  - f/u myomarker panel   - PFTs with lung volumes as outpatient.   - 1200 mg dietary calcium and Vitamin D3 1000 mg daily  - continue H2 blocker  - f/u with Dr. Swift and Dr. Campos in Rheumatology upon discharge.           Lynn Espinoza,   Rheumatology  [facility]        I  Have personally take the history and examined the patient and agree with fellow's note as stated above. No new/additional recommendations except change Solu-Medrol IV to Medrol 48mg po daily with famotidine. Cont PT/OT. Await transfer to Rehab [facility], will need colonoscopy with dx of dermatomyositis.

## 2019-02-09 NOTE — SUBJECTIVE & OBJECTIVE
Interval History: NAEO, strength slowly improving as CPK downtrends on steroids. tolerating tube feeds well. Dispo pending rehab placement early this week    Oncology Treatment Plan:   OP BREAST DOCETAXEL Q3W    Medications:  Continuous Infusions:    Scheduled Meds:   amLODIPine  10 mg Per G Tube Daily    enoxaparin  40 mg Subcutaneous Daily    escitalopram oxalate  10 mg Per G Tube Daily    famotidine  20 mg Per G Tube BID    folic acid  1 mg Per G Tube Daily    guaifenesin 100 mg/5 ml  200 mg Per G Tube Q4H    [START ON 2/10/2019] methylPREDNISolone  48 mg Oral Daily     PRN Meds:acetaminophen, ALPRAZolam, dextrose 50%, dextrose 50%, glucagon (human recombinant), glucose, glucose, hepatitis B, hydrALAZINE, insulin aspart U-100, morphine, ondansetron, pneumoc 13-brandin conj-dip cr(PF), polyethylene glycol, promethazine, sodium chloride, sodium chloride 0.9%, sodium chloride 0.9%     Review of Systems   Constitutional: Positive for fatigue. Negative for activity change, appetite change, chills, diaphoresis, fever and unexpected weight change.   HENT: Positive for facial swelling and trouble swallowing. Negative for congestion, ear discharge, hearing loss, postnasal drip, sinus pressure, sneezing, sore throat and tinnitus.    Eyes: Negative for photophobia, pain and redness.   Respiratory: Negative for apnea, cough, choking, chest tightness, shortness of breath and stridor.    Cardiovascular: Negative for chest pain, palpitations and leg swelling.   Gastrointestinal: Negative for abdominal pain, anal bleeding, constipation, diarrhea, nausea and rectal pain.   Endocrine: Negative for polyuria.   Genitourinary: Negative for dysuria and hematuria.   Musculoskeletal: Negative for arthralgias, back pain, gait problem, joint swelling, myalgias, neck pain and neck stiffness.   Skin: Positive for rash. Negative for color change and pallor.   Allergic/Immunologic: Negative for immunocompromised state.   Neurological:  Positive for weakness. Negative for dizziness, seizures and numbness.   Hematological: Positive for adenopathy. Does not bruise/bleed easily.   Psychiatric/Behavioral: Negative for agitation and behavioral problems.     Objective:     Vital Signs (Most Recent):  Temp: 98.4 °F (36.9 °C) (02/09/19 1230)  Pulse: 93 (02/09/19 1230)  Resp: 18 (02/09/19 1230)  BP: 111/64 (02/09/19 1230)  SpO2: 98 % (02/09/19 1230) Vital Signs (24h Range):  Temp:  [98 °F (36.7 °C)-98.8 °F (37.1 °C)] 98.4 °F (36.9 °C)  Pulse:  [72-93] 93  Resp:  [16-18] 18  SpO2:  [97 %-100 %] 98 %  BP: (108-130)/(59-69) 111/64     Weight: 73.7 kg (162 lb 7.7 oz)  Body mass index is 27.04 kg/m².  Body surface area is 1.84 meters squared.      Intake/Output Summary (Last 24 hours) at 2/9/2019 1543  Last data filed at 2/9/2019 0700  Gross per 24 hour   Intake 680 ml   Output 2000 ml   Net -1320 ml       Physical Exam   Constitutional: She is oriented to person, place, and time. She appears well-developed and well-nourished. No distress.   HENT:   Head: Atraumatic.   Nose: Nose normal.   Mouth/Throat: No oropharyngeal exudate.   Eyes: Conjunctivae and EOM are normal. Pupils are equal, round, and reactive to light. Right eye exhibits no discharge. Left eye exhibits no discharge. No scleral icterus.   Neck: Normal range of motion. Neck supple. No tracheal deviation present.   Cardiovascular: Normal rate, regular rhythm and intact distal pulses. Exam reveals no gallop and no friction rub.   Pulmonary/Chest: Effort normal and breath sounds normal. No respiratory distress. She has no wheezes. She has no rales. She exhibits no tenderness.   Abdominal: Soft. Bowel sounds are normal. She exhibits no distension and no mass. There is no tenderness. There is no rebound and no guarding.   PEG tube in situ   Musculoskeletal: Normal range of motion. She exhibits no tenderness or deformity.   Neurological: She is alert and oriented to person, place, and time. No cranial  nerve deficit or sensory deficit.     4.5/5 shoulder strength on shoulder abduction; improving  5/5 flexion and extension preserved at elbow   Skin: Skin is warm and dry. Capillary refill takes less than 2 seconds. No rash noted. She is not diaphoretic. No erythema. No pallor.   Psychiatric: She has a normal mood and affect.   Vitals reviewed.      Significant Labs:   BMP:   Recent Labs   Lab 02/08/19 0344 02/09/19 0440   * 120*   * 135*   K 4.1 3.8    104   CO2 26 27   BUN 19 19   CREATININE 0.6 0.5   CALCIUM 8.5* 8.8   MG 2.0 2.0   , CBC:   Recent Labs   Lab 02/08/19 0344 02/09/19 0440   WBC 9.23 4.38   HGB 7.3* 7.5*   HCT 23.4* 23.9*    167   , CMP:   Recent Labs   Lab 02/08/19 0344 02/09/19 0440   * 135*   K 4.1 3.8    104   CO2 26 27   * 120*   BUN 19 19   CREATININE 0.6 0.5   CALCIUM 8.5* 8.8   PROT 6.1 6.4   ALBUMIN 2.5* 2.7*   BILITOT 0.5 0.5   ALKPHOS 58 59   AST 76* 79*   ALT 36 46*   ANIONGAP 6* 4*   EGFRNONAA >60.0 >60.0   , Coagulation: No results for input(s): PT, INR, APTT in the last 48 hours., Haptoglobin: No results for input(s): HAPTOGLOBIN in the last 48 hours., Immunology: No results for input(s): SPEP, ERVIN, DARCY, FREELAMBDALI in the last 48 hours., LDH: No results for input(s): LDHCSF, BFSOURCE in the last 48 hours., LFTs:   Recent Labs   Lab 02/08/19 0344 02/09/19 0440   ALT 36 46*   AST 76* 79*   ALKPHOS 58 59   BILITOT 0.5 0.5   PROT 6.1 6.4   ALBUMIN 2.5* 2.7*   , Reticulocytes: No results for input(s): RETIC in the last 48 hours., Tumor Markers: No results for input(s): PSA, CEA, , AFPTM, [id number],  in the last 48 hours.    Invalid input(s): ALGTM, Uric Acid No results for input(s): URICACID in the last 48 hours., Urine Studies: No results for input(s): COLORU, APPEARANCEUA, PHUR, SPECGRAV, PROTEINUA, GLUCUA, KETONESU, BILIRUBINUA, OCCULTUA, NITRITE, UROBILINOGEN, LEUKOCYTESUR, RBCUA, WBCUA, BACTERIA, SQUAMEPITHEL, HYALINECASTS in  the last 48 hours.    Invalid input(s): WRIGHTSUR and All pertinent labs from the last 24 hours have been reviewed.    Diagnostic Results:  EXAMINATION:  FL MODIFIED BARIUM SWALLOW SPEECH STUDY    CLINICAL HISTORY:  repeat to determine diet initiation.;    TECHNIQUE:  Modified Barium Swallow Study. Video fluoroscopic swallowing examination was performed in conjunction with the speech pathology department.  Various liquid and solid food substances were used to assess swallowing.    Fluoroscopy Time: 1.9 minutes    COMPARISON:  Fluoroscopic modified barium swallow 01/28/2019      Impression       Transit: Delayed oral transit time. Significant vallecular and pyriform recess residual/layering.    Penetration/Aspiration: Aspiration following the consumption of liquids.  Significant stasis of puree food with high risk of aspiration.    Other: N/A.    See speech pathology report for further details.    Electronically signed by resident: Latrell Garcia MD  Date: 02/06/2019  Time: 15:43    Electronically signed by: Shay Reddy MD  Date: 02/06/2019

## 2019-02-09 NOTE — ASSESSMENT & PLAN NOTE
59yo F with history of L sided infiltrating ductal carcinoma of the L breast (dx on Jan 2017), HTN, depression, and gastritis was send from hem/onc clinic for evaluation of possible inflammatory myositis.      Patient started on Atelizumab/Abraxane on 11/7/18. Per chart review, patient noticed rashes on the dorsum of her hands on 11/11/18. Seen in urgent care and given topical steroid cream. Then received two more infusions on Atelizumab/Abraxane on 11/21/18 and 12/5/18. Started to notice puffiness around the eyes on 12/11/18. Received another Abraxane infusion on 12/12/18 but this time with hydrocortisone 50 mg which helped reduce the swelling around the eyes. Developed swelling of the face again on 12/15/18. Next infusion of Abraxane done on 12/19/18 with Solucortef and Atelizumab held. Abraxane given again on 1/3/19 with no IV steroid. Patient developed swelling of the face on 1/4/19 and went to urgent care and given short course of prednisone 20 mg BID. On 1/15/19, patient seen in hem/onc clinic with c/o of pressure and tightness around neck. CT scan of chest and neck did not reveal any vascular compression. Started on prednisone 60 mg with taper. On 1/22/18 patient with c/o proximal muscle weakness. CPK found to be elevated around 4k. Patient admitted and given solumedrol 80 mg IV on 1/22/18. Last infusion of Atelizumab on 12/19/18. Last infusion of Abraxane on 1/3/19. Given Solumedrol 1g x 1 on 1/23/18. Seen by Dermatology on 1/24/18 and biopsy done of skin rash. Given distribution of rashes, there was concern for dermatomyositis. Patient started on Solumedrol 125 mg IV BID at that time.  Skin biopsy resulted consistent with dermatomyositis.     Labs: ESR 50, CRP 31.1, CPK 4562 (trending down), Aldolase 13.6.  , ALT 64.  UA normal.  CBC unremarkable.  +DARCY 1:640 speckled with negative profile.  Complements normal. Normal GGT. RPR, HIV negative. Hep B/C negative.  Strongyloides negative. HMGCR antibody  "negative.  Ferritin elevated at 641, iron on low side at 37, tibc low at 247, transferrin low 167, haptoglobin 153, retic slightly increased at 2.6%, ldh increased at 325. quantTB: indeterminate     Spirometry: normal, normal diffusion capacity. FVC: 81%, DLCO: 114%     Case reports of docetaxel related inflammatory myositis had been documented. "...taxanes have been noted to cause disabling but transient arthralgia and myalgias; it is important to consider the possibility of inflammatory myopathy as a possible complication in patients undergoing treatment with these agents." - A case of docetaxel induced myositis and review of literature. Austin et al 2015.     Case reports in Rheumatology   Case reports of atezolizumb (immunomodulator) cause of myositis  - Lucy et al 2017 "Myositis as an adverse even of immune checkpoint blockade for cancer therapy.  Seminars in Arthritis and Rheumatism      Patient exhibits signs of dermatomyositis (heliotropic rash and gottron's papules).   Dermatomyositis can be associated with malignancy.  MRI of LUE showed edema of the deltoid and biceps.  Exam with proximal muscle weakness: b/l deltoids 4/5, b/l iliopsoas 4.4/5. 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     Failed 2nd swallow eval on 2/6/19. PEG placed 2/7/2019.     Inpatient treatments so far:  - Solumedrol 80 mg IV x 1 on 1/22/19  - Solumedrol 1 g IV x 1 on 1/23/19.  - Solumedrol 125 mg IV BID since 1/24/19 -->being tapered down.  - IVIG 1/29/19-2/2     Plan:  - chemotherapy can be re-started as it may help treat Dermatomyositis. Would recommend against checkpoint inhibitor, however, as it may have been a trigger for the dermatomyositis  - continue to monitor CPK, aldolase, " AST/ALT  - recommend changing to Medrol 48 mg oral daily now that patient has PEG placed.  - completed IVIg 400 mg/kg daily x 5 days. Will need IVIG 1 g/kg on 2/28/19.   - should get colonoscopy as outpatient as part of malignancy work-up   - MTX to 20 mg sc weekly (Tuesdays) with daily folic acid.   - follow up T-spot  - recommend Rehab after discharge to help with strengthening.  - recommend ID consult for vaccinations.  - f/u esophageal biopsy results  - f/u myomarker panel   - PFTs with lung volumes as outpatient.   - 1200 mg dietary calcium and Vitamin D3 1000 mg daily  - continue H2 blocker  - f/u with Dr. Swift and Dr. Campos in Rheumatology upon discharge.

## 2019-02-10 PROBLEM — D64.9 ANEMIA: Status: ACTIVE | Noted: 2019-02-10

## 2019-02-10 LAB
ALBUMIN SERPL BCP-MCNC: 2.4 G/DL
ALP SERPL-CCNC: 65 U/L
ALT SERPL W/O P-5'-P-CCNC: 37 U/L
ANION GAP SERPL CALC-SCNC: 4 MMOL/L
AST SERPL-CCNC: 63 U/L
BASOPHILS # BLD AUTO: 0 K/UL
BASOPHILS NFR BLD: 0 %
BILIRUB SERPL-MCNC: 0.3 MG/DL
BUN SERPL-MCNC: 24 MG/DL
CALCIUM SERPL-MCNC: 8.1 MG/DL
CHLORIDE SERPL-SCNC: 104 MMOL/L
CK SERPL-CCNC: 166 U/L
CO2 SERPL-SCNC: 28 MMOL/L
CREAT SERPL-MCNC: 0.5 MG/DL
DIFFERENTIAL METHOD: ABNORMAL
EOSINOPHIL # BLD AUTO: 0 K/UL
EOSINOPHIL NFR BLD: 0.4 %
ERYTHROCYTE [DISTWIDTH] IN BLOOD BY AUTOMATED COUNT: 20 %
EST. GFR  (AFRICAN AMERICAN): >60 ML/MIN/1.73 M^2
EST. GFR  (NON AFRICAN AMERICAN): >60 ML/MIN/1.73 M^2
GLUCOSE SERPL-MCNC: 106 MG/DL
HCT VFR BLD AUTO: 22.7 %
HGB BLD-MCNC: 7 G/DL
IMM GRANULOCYTES # BLD AUTO: 0.02 K/UL
IMM GRANULOCYTES NFR BLD AUTO: 0.4 %
LYMPHOCYTES # BLD AUTO: 1 K/UL
LYMPHOCYTES NFR BLD: 21.3 %
MAGNESIUM SERPL-MCNC: 1.8 MG/DL
MCH RBC QN AUTO: 27.5 PG
MCHC RBC AUTO-ENTMCNC: 30.8 G/DL
MCV RBC AUTO: 89 FL
MONOCYTES # BLD AUTO: 0.3 K/UL
MONOCYTES NFR BLD: 6.3 %
NEUTROPHILS # BLD AUTO: 3.4 K/UL
NEUTROPHILS NFR BLD: 71.6 %
NRBC BLD-RTO: 0 /100 WBC
PHOSPHATE SERPL-MCNC: 1.9 MG/DL
PLATELET # BLD AUTO: 152 K/UL
PMV BLD AUTO: 10.1 FL
POTASSIUM SERPL-SCNC: 3.7 MMOL/L
PROT SERPL-MCNC: 5.6 G/DL
RBC # BLD AUTO: 2.55 M/UL
RETICS/RBC NFR AUTO: 3 %
SODIUM SERPL-SCNC: 136 MMOL/L
WBC # BLD AUTO: 4.79 K/UL

## 2019-02-10 PROCEDURE — 25000003 PHARM REV CODE 250: Performed by: STUDENT IN AN ORGANIZED HEALTH CARE EDUCATION/TRAINING PROGRAM

## 2019-02-10 PROCEDURE — 85025 COMPLETE CBC W/AUTO DIFF WBC: CPT

## 2019-02-10 PROCEDURE — 80053 COMPREHEN METABOLIC PANEL: CPT

## 2019-02-10 PROCEDURE — 83735 ASSAY OF MAGNESIUM: CPT

## 2019-02-10 PROCEDURE — 99233 PR SUBSEQUENT HOSPITAL CARE,LEVL III: ICD-10-PCS | Mod: ,,, | Performed by: INTERNAL MEDICINE

## 2019-02-10 PROCEDURE — 82550 ASSAY OF CK (CPK): CPT

## 2019-02-10 PROCEDURE — 85045 AUTOMATED RETICULOCYTE COUNT: CPT

## 2019-02-10 PROCEDURE — 63600175 PHARM REV CODE 636 W HCPCS: Performed by: STUDENT IN AN ORGANIZED HEALTH CARE EDUCATION/TRAINING PROGRAM

## 2019-02-10 PROCEDURE — 84100 ASSAY OF PHOSPHORUS: CPT

## 2019-02-10 PROCEDURE — 20600001 HC STEP DOWN PRIVATE ROOM

## 2019-02-10 PROCEDURE — 63600175 PHARM REV CODE 636 W HCPCS: Performed by: INTERNAL MEDICINE

## 2019-02-10 PROCEDURE — 99233 SBSQ HOSP IP/OBS HIGH 50: CPT | Mod: ,,, | Performed by: INTERNAL MEDICINE

## 2019-02-10 PROCEDURE — 82085 ASSAY OF ALDOLASE: CPT

## 2019-02-10 RX ADMIN — GUAIFENESIN 200 MG: 100 SOLUTION ORAL at 05:02

## 2019-02-10 RX ADMIN — GUAIFENESIN 200 MG: 100 SOLUTION ORAL at 01:02

## 2019-02-10 RX ADMIN — AMLODIPINE BESYLATE 10 MG: 10 TABLET ORAL at 08:02

## 2019-02-10 RX ADMIN — GUAIFENESIN 200 MG: 100 SOLUTION ORAL at 09:02

## 2019-02-10 RX ADMIN — ENOXAPARIN SODIUM 40 MG: 100 INJECTION SUBCUTANEOUS at 05:02

## 2019-02-10 RX ADMIN — ESCITALOPRAM OXALATE 10 MG: 5 TABLET, FILM COATED ORAL at 08:02

## 2019-02-10 RX ADMIN — FOLIC ACID 1 MG: 1 TABLET ORAL at 08:02

## 2019-02-10 RX ADMIN — METHYLPREDNISOLONE 48 MG: 4 TABLET ORAL at 08:02

## 2019-02-10 RX ADMIN — FAMOTIDINE 20 MG: 20 TABLET ORAL at 09:02

## 2019-02-10 RX ADMIN — FAMOTIDINE 20 MG: 20 TABLET ORAL at 08:02

## 2019-02-10 NOTE — SUBJECTIVE & OBJECTIVE
Interval History: pt stated no issues overnight, no new complaints. Awaiting Rehab placement.     Current Facility-Administered Medications   Medication Frequency    acetaminophen tablet 650 mg Q4H PRN    ALPRAZolam tablet 0.25 mg TID PRN    amLODIPine tablet 10 mg Daily    dextrose 50% injection 12.5 g PRN    dextrose 50% injection 25 g PRN    enoxaparin injection 40 mg Daily    escitalopram oxalate tablet 10 mg Daily    famotidine tablet 20 mg BID    folic acid tablet 1 mg Daily    glucagon (human recombinant) injection 1 mg PRN    glucose chewable tablet 16 g PRN    glucose chewable tablet 24 g PRN    guaifenesin 100 mg/5 ml syrup 200 mg Q4H    hepatitis B (HEPLISAV-B) 20 mcg/0.5 mL vaccine 0.5 mL vaccine x 1 dose    hydrALAZINE injection 10 mg Q6H PRN    insulin aspart U-100 pen 0-5 Units QID (AC + HS) PRN    methylPREDNISolone tablet 48 mg Daily    morphine injection 2 mg Q6H PRN    ondansetron disintegrating tablet 8 mg Q8H PRN    pneumoc 13-brandin conj-dip cr(PF) (PREVNAR 13 (PF)) 0.5 mL vaccine x 1 dose    polyethylene glycol packet 17 g BID PRN    promethazine tablet 12.5 mg Q6H PRN    sodium chloride 0.65 % nasal spray 1 spray PRN    sodium chloride 0.9% flush 10 mL PRN    sodium chloride 0.9% flush 5 mL PRN     Objective:     Vital Signs (Most Recent):  Temp: 98.7 °F (37.1 °C) (02/10/19 0419)  Pulse: 97 (02/10/19 0419)  Resp: 20 (02/10/19 0419)  BP: (!) 102/56 (02/10/19 0419)  SpO2: 96 % (02/10/19 0419)  O2 Device (Oxygen Therapy): room air (02/10/19 0419) Vital Signs (24h Range):  Temp:  [98 °F (36.7 °C)-98.7 °F (37.1 °C)] 98.7 °F (37.1 °C)  Pulse:  [84-97] 97  Resp:  [18-20] 20  SpO2:  [96 %-99 %] 96 %  BP: (102-118)/(55-64) 102/56     Weight: 73.6 kg (162 lb 5.9 oz) (02/10/19 0400)  Body mass index is 27.02 kg/m².  Body surface area is 1.84 meters squared.      Intake/Output Summary (Last 24 hours) at 2/10/2019 0848  Last data filed at 2/10/2019 0540  Gross per 24 hour   Intake  2126 ml   Output 1400 ml   Net 726 ml       Physical Exam   Constitutional: She is oriented to person, place, and time and well-developed, well-nourished, and in no distress. No distress.   HENT:   Head: Normocephalic and atraumatic.   Right Ear: External ear normal.   Left Ear: External ear normal.   Eyes: Conjunctivae and EOM are normal. Pupils are equal, round, and reactive to light. Right eye exhibits no discharge. Left eye exhibits no discharge. No scleral icterus.   Neck: Normal range of motion. Neck supple.   Cardiovascular: Normal rate, regular rhythm and normal heart sounds.    Pulmonary/Chest: Effort normal and breath sounds normal. No respiratory distress.   Abdominal: Soft. Bowel sounds are normal.       Right Side Rheumatological Exam     Muscle Strength (0-5 scale):  Neck Flexion:  4  Neck Extension: 5  Deltoid:  4  Biceps: 5/5   Triceps:  4.4  : 5/5   Iliopsoas: 4.6  Quadriceps:  5   Distal Lower Extremity: 5    Left Side Rheumatological Exam     Muscle Strength (0-5 scale):  Neck Flexion:  4  Neck Extension: 5  Deltoid:  4  Biceps: 5/5   Triceps:  4.4  :  5/5   Iliopsoas: 4.6  Quadriceps:  5   Distal Lower Extremity: 5      Neurological: She is alert and oriented to person, place, and time.   Skin: Skin is warm and dry. No rash noted. She is not diaphoretic.     Hyperpigmented non raised rash over nape of neck, elbows, knuckles (resemble Gottron's papules), thighs, and ankles- resolved       Psychiatric: Mood and affect normal.   Musculoskeletal: Normal range of motion. She exhibits no edema, tenderness or deformity.         Significant Labs:  Recent Results (from the past 48 hour(s))   Comprehensive Metabolic Panel (CMP)    Collection Time: 02/09/19  4:40 AM   Result Value Ref Range    Sodium 135 (L) 136 - 145 mmol/L    Potassium 3.8 3.5 - 5.1 mmol/L    Chloride 104 95 - 110 mmol/L    CO2 27 23 - 29 mmol/L    Glucose 120 (H) 70 - 110 mg/dL    BUN, Bld 19 6 - 20 mg/dL    Creatinine 0.5 0.5 -  1.4 mg/dL    Calcium 8.8 8.7 - 10.5 mg/dL    Total Protein 6.4 6.0 - 8.4 g/dL    Albumin 2.7 (L) 3.5 - 5.2 g/dL    Total Bilirubin 0.5 0.1 - 1.0 mg/dL    Alkaline Phosphatase 59 55 - 135 U/L    AST 79 (H) 10 - 40 U/L    ALT 46 (H) 10 - 44 U/L    Anion Gap 4 (L) 8 - 16 mmol/L    eGFR if African American >60.0 >60 mL/min/1.73 m^2    eGFR if non African American >60.0 >60 mL/min/1.73 m^2   Magnesium    Collection Time: 02/09/19  4:40 AM   Result Value Ref Range    Magnesium 2.0 1.6 - 2.6 mg/dL   Phosphorus    Collection Time: 02/09/19  4:40 AM   Result Value Ref Range    Phosphorus 2.7 2.7 - 4.5 mg/dL   CK    Collection Time: 02/09/19  4:40 AM   Result Value Ref Range     (H) 20 - 180 U/L   CBC auto differential    Collection Time: 02/09/19  4:40 AM   Result Value Ref Range    WBC 4.38 3.90 - 12.70 K/uL    RBC 2.67 (L) 4.00 - 5.40 M/uL    Hemoglobin 7.5 (L) 12.0 - 16.0 g/dL    Hematocrit 23.9 (L) 37.0 - 48.5 %    MCV 90 82 - 98 fL    MCH 28.1 27.0 - 31.0 pg    MCHC 31.4 (L) 32.0 - 36.0 g/dL    RDW 19.9 (H) 11.5 - 14.5 %    Platelets 167 150 - 350 K/uL    MPV 10.6 9.2 - 12.9 fL    Immature Granulocytes 0.5 0.0 - 0.5 %    Gran # (ANC) 3.7 1.8 - 7.7 K/uL    Immature Grans (Abs) 0.02 0.00 - 0.04 K/uL    Lymph # 0.4 (L) 1.0 - 4.8 K/uL    Mono # 0.3 0.3 - 1.0 K/uL    Eos # 0.0 0.0 - 0.5 K/uL    Baso # 0.00 0.00 - 0.20 K/uL    nRBC 0 0 /100 WBC    Gran% 84.4 (H) 38.0 - 73.0 %    Lymph% 8.7 (L) 18.0 - 48.0 %    Mono% 6.4 4.0 - 15.0 %    Eosinophil% 0.0 0.0 - 8.0 %    Basophil% 0.0 0.0 - 1.9 %    Differential Method Automated    Comprehensive Metabolic Panel (CMP)    Collection Time: 02/10/19  4:00 AM   Result Value Ref Range    Sodium 136 136 - 145 mmol/L    Potassium 3.7 3.5 - 5.1 mmol/L    Chloride 104 95 - 110 mmol/L    CO2 28 23 - 29 mmol/L    Glucose 106 70 - 110 mg/dL    BUN, Bld 24 (H) 6 - 20 mg/dL    Creatinine 0.5 0.5 - 1.4 mg/dL    Calcium 8.1 (L) 8.7 - 10.5 mg/dL    Total Protein 5.6 (L) 6.0 - 8.4 g/dL     Albumin 2.4 (L) 3.5 - 5.2 g/dL    Total Bilirubin 0.3 0.1 - 1.0 mg/dL    Alkaline Phosphatase 65 55 - 135 U/L    AST 63 (H) 10 - 40 U/L    ALT 37 10 - 44 U/L    Anion Gap 4 (L) 8 - 16 mmol/L    eGFR if African American >60.0 >60 mL/min/1.73 m^2    eGFR if non African American >60.0 >60 mL/min/1.73 m^2   Magnesium    Collection Time: 02/10/19  4:00 AM   Result Value Ref Range    Magnesium 1.8 1.6 - 2.6 mg/dL   Phosphorus    Collection Time: 02/10/19  4:00 AM   Result Value Ref Range    Phosphorus 1.9 (L) 2.7 - 4.5 mg/dL   CK    Collection Time: 02/10/19  4:00 AM   Result Value Ref Range     20 - 180 U/L   CBC auto differential    Collection Time: 02/10/19  4:00 AM   Result Value Ref Range    WBC 4.79 3.90 - 12.70 K/uL    RBC 2.55 (L) 4.00 - 5.40 M/uL    Hemoglobin 7.0 (L) 12.0 - 16.0 g/dL    Hematocrit 22.7 (L) 37.0 - 48.5 %    MCV 89 82 - 98 fL    MCH 27.5 27.0 - 31.0 pg    MCHC 30.8 (L) 32.0 - 36.0 g/dL    RDW 20.0 (H) 11.5 - 14.5 %    Platelets 152 150 - 350 K/uL    MPV 10.1 9.2 - 12.9 fL    Immature Granulocytes 0.4 0.0 - 0.5 %    Gran # (ANC) 3.4 1.8 - 7.7 K/uL    Immature Grans (Abs) 0.02 0.00 - 0.04 K/uL    Lymph # 1.0 1.0 - 4.8 K/uL    Mono # 0.3 0.3 - 1.0 K/uL    Eos # 0.0 0.0 - 0.5 K/uL    Baso # 0.00 0.00 - 0.20 K/uL    nRBC 0 0 /100 WBC    Gran% 71.6 38.0 - 73.0 %    Lymph% 21.3 18.0 - 48.0 %    Mono% 6.3 4.0 - 15.0 %    Eosinophil% 0.4 0.0 - 8.0 %    Basophil% 0.0 0.0 - 1.9 %    Differential Method Automated       Ref. Range 1/31/2019 04:00 2/1/2019 04:12 2/2/2019 04:44 2/3/2019 03:29 2/4/2019 04:06 2/5/2019 04:55 2/6/2019 04:09 2/7/2019 03:50   CPK Latest Ref Range: 20 - 180 U/L 1147 (H) 827 (H) 662 (H) 517 (H) 465 (H) 434 (H) 448 (H) 357 (H)          Ref. Range 1/24/2019 03:40 1/25/2019 04:20 1/26/2019 04:00 1/27/2019 04:21 1/28/2019 04:00   Aldolase Latest Ref Range: 1.2 - 7.6 U/L 12.7 (H) 14.2 (H) 13.7 (H) 13.1 (H) 15.4 (H)      Ref. Range 1/22/2019 22:24   Sed Rate Latest Ref Range: 0 - 36  mm/Hr 50 (H)        Ref. Range 1/22/2019 22:24   CRP Latest Ref Range: 0.0 - 8.2 mg/L 31.1 (H)      Quant TB 1/30/19: indeterminate       Ref. Range 1/22/2019 22:24 1/25/2019 17:33   DARCY HEP-2 Titer Unknown Positive 1:640 Sp...    Anti-SSA Antibody Latest Ref Range: 0.00 - 19.99 EU 0.49    Anti-SSA Interpretation Latest Ref Range: Negative  Negative    Anti-SSB Antibody Latest Ref Range: 0.00 - 19.99 EU 0.11    Anti-SSB Interpretation Latest Ref Range: Negative  Negative    ds DNA Ab Latest Ref Range: Negative 1:10  Negative 1:10    Anti Sm Antibody Latest Ref Range: 0.00 - 19.99 EU 0.58    Anti-Sm Interpretation Latest Ref Range: Negative  Negative    Anti Sm/RNP Antibody Latest Ref Range: 0.00 - 19.99 EU 0.62    Anti-Sm/RNP Interpretation Latest Ref Range: Negative  Negative    DARCY Screen Latest Ref Range: Negative <1:160  Positive (A)    Complement (C-3) Latest Ref Range: 50 - 180 mg/dL  106   Complement (C-4) Latest Ref Range: 11 - 44 mg/dL  29   Meg-1 Autoantibodies Latest Ref Range: <1.0 Index <1.00       Ref. Range 1/23/2019 02:55 1/23/2019 02:56   Specimen UA Unknown Urine, Clean Catch    Color, UA Latest Ref Range: Yellow, Straw, [place]  Colorless (A)    pH, UA Latest Ref Range: 5.0 - 8.0  6.0    Specific Gravity, UA Latest Ref Range: 1.005 - 1.030  1.005    Appearance, UA Latest Ref Range: Clear  Clear    Protein, UA Latest Ref Range: Negative  Negative    Glucose, UA Latest Ref Range: Negative  Negative    Ketones, UA Latest Ref Range: Negative  Negative    Occult Blood UA Latest Ref Range: Negative  Negative    Nitrite, UA Latest Ref Range: Negative  Negative    Bilirubin (UA) Latest Ref Range: Negative  Negative    Leukocytes, UA Latest Ref Range: Negative  Trace (A)    RBC, UA Latest Ref Range: 0 - 4 /hpf  2   WBC, UA Latest Ref Range: 0 - 5 /hpf  3   Squam Epithel, UA Latest Units: /hpf  0   Microscopic Comment Unknown  SEE COMMENT     Meg-1: Negative    Significant Imaging:  MRI Humerus w/ and w/o  contrast:  Impression       1. Diffuse edema and enhancement of the visualized left upper extremity musculature, most pronounced proximally, most notably of the deltoid and biceps musculature as detailed above.  Findings are nonspecific although could relate to a nonspecific myelitis particularly in light of patient's reported history.  Clinical correlation with appropriate history and lab markers advised.  2. No evidence of abscess or osteomyelitis.  This report was flagged in Epic as abnormal.     NM PET CT 12/27/18:  Impression       See above    Significant improvement in all the previously seen lymph nodes involving the left lower neck, supraclavicular region, axillary and retropectoral region.    Index left retropectoral SUV max 3.57, previously 27.2.     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 Biopsied.                        - Small hiatal hernia.                        - Normal stomach.                        - Normal examined duodenum.  Recommendation:       - Return patient to hospital to for ongoing care.                        - Await pathology results.                        - Use a proton pump inhibitor PO BID.                        - The findings and recommendations were discussed                         with the designated responsible adult.                        - Repeat upper endoscopy in 8 weeks to check                         healing.

## 2019-02-10 NOTE — SUBJECTIVE & OBJECTIVE
Interval History: NAEO, strength slowly improving as CPK downtrends on steroids. tolerating tube feeds well. Dispo pending rehab placement early this week    Oncology Treatment Plan:   OP BREAST DOCETAXEL Q3W    Medications:  Continuous Infusions:    Scheduled Meds:   amLODIPine  10 mg Per G Tube Daily    enoxaparin  40 mg Subcutaneous Daily    escitalopram oxalate  10 mg Per G Tube Daily    famotidine  20 mg Per G Tube BID    folic acid  1 mg Per G Tube Daily    guaifenesin 100 mg/5 ml  200 mg Per G Tube Q4H    methylPREDNISolone  48 mg Oral Daily     PRN Meds:acetaminophen, ALPRAZolam, dextrose 50%, dextrose 50%, glucagon (human recombinant), glucose, glucose, hepatitis B, hydrALAZINE, insulin aspart U-100, morphine, ondansetron, pneumoc 13-brandin conj-dip cr(PF), polyethylene glycol, promethazine, sodium chloride, sodium chloride 0.9%, sodium chloride 0.9%     Review of Systems   Constitutional: Positive for fatigue. Negative for activity change, appetite change, chills, diaphoresis, fever and unexpected weight change.   HENT: Positive for facial swelling and trouble swallowing. Negative for congestion, ear discharge, hearing loss, postnasal drip, sinus pressure, sneezing, sore throat and tinnitus.    Eyes: Negative for photophobia, pain and redness.   Respiratory: Negative for apnea, cough, choking, chest tightness, shortness of breath and stridor.    Cardiovascular: Negative for chest pain, palpitations and leg swelling.   Gastrointestinal: Negative for abdominal pain, anal bleeding, constipation, diarrhea, nausea and rectal pain.   Endocrine: Negative for polyuria.   Genitourinary: Negative for dysuria and hematuria.   Musculoskeletal: Negative for arthralgias, back pain, gait problem, joint swelling, myalgias, neck pain and neck stiffness.   Skin: Positive for rash. Negative for color change and pallor.   Allergic/Immunologic: Negative for immunocompromised state.   Neurological: Positive for weakness.  Negative for dizziness, seizures and numbness.   Hematological: Positive for adenopathy. Does not bruise/bleed easily.   Psychiatric/Behavioral: Negative for agitation and behavioral problems.     Objective:     Vital Signs (Most Recent):  Temp: 97.9 °F (36.6 °C) (02/10/19 0851)  Pulse: 92 (02/10/19 0851)  Resp: 20 (02/10/19 0851)  BP: (!) 109/57 (02/10/19 0851)  SpO2: 98 % (02/10/19 0851) Vital Signs (24h Range):  Temp:  [97.9 °F (36.6 °C)-98.7 °F (37.1 °C)] 97.9 °F (36.6 °C)  Pulse:  [84-97] 92  Resp:  [18-20] 20  SpO2:  [96 %-99 %] 98 %  BP: (102-118)/(55-64) 109/57     Weight: 73.6 kg (162 lb 5.9 oz)  Body mass index is 27.02 kg/m².  Body surface area is 1.84 meters squared.      Intake/Output Summary (Last 24 hours) at 2/10/2019 0910  Last data filed at 2/10/2019 0540  Gross per 24 hour   Intake 2126 ml   Output 1400 ml   Net 726 ml       Physical Exam   Constitutional: She is oriented to person, place, and time. She appears well-developed and well-nourished. No distress.   HENT:   Head: Atraumatic.   Nose: Nose normal.   Mouth/Throat: No oropharyngeal exudate.   Eyes: Conjunctivae and EOM are normal. Pupils are equal, round, and reactive to light. Right eye exhibits no discharge. Left eye exhibits no discharge. No scleral icterus.   Neck: Normal range of motion. Neck supple. No tracheal deviation present.   Cardiovascular: Normal rate, regular rhythm and intact distal pulses. Exam reveals no gallop and no friction rub.   Pulmonary/Chest: Effort normal and breath sounds normal. No respiratory distress. She has no wheezes. She has no rales. She exhibits no tenderness.   Abdominal: Soft. Bowel sounds are normal. She exhibits no distension and no mass. There is no tenderness. There is no rebound and no guarding.   PEG tube in situ   Musculoskeletal: Normal range of motion. She exhibits no tenderness or deformity.   Neurological: She is alert and oriented to person, place, and time. No cranial nerve deficit or  sensory deficit.     4.5/5 shoulder strength on shoulder abduction; improving  5/5 flexion and extension preserved at elbow   Skin: Skin is warm and dry. Capillary refill takes less than 2 seconds. No rash noted. She is not diaphoretic. No erythema. No pallor.   Psychiatric: She has a normal mood and affect.   Vitals reviewed.      Significant Labs:   BMP:   Recent Labs   Lab 02/09/19 0440 02/10/19  0400   * 106   * 136   K 3.8 3.7    104   CO2 27 28   BUN 19 24*   CREATININE 0.5 0.5   CALCIUM 8.8 8.1*   MG 2.0 1.8   , CBC:   Recent Labs   Lab 02/09/19  0440 02/10/19  0400   WBC 4.38 4.79   HGB 7.5* 7.0*   HCT 23.9* 22.7*    152   , CMP:   Recent Labs   Lab 02/09/19  0440 02/10/19  0400   * 136   K 3.8 3.7    104   CO2 27 28   * 106   BUN 19 24*   CREATININE 0.5 0.5   CALCIUM 8.8 8.1*   PROT 6.4 5.6*   ALBUMIN 2.7* 2.4*   BILITOT 0.5 0.3   ALKPHOS 59 65   AST 79* 63*   ALT 46* 37   ANIONGAP 4* 4*   EGFRNONAA >60.0 >60.0   , Coagulation: No results for input(s): PT, INR, APTT in the last 48 hours., Haptoglobin: No results for input(s): HAPTOGLOBIN in the last 48 hours., Immunology: No results for input(s): SPEP, ERVIN, DARCY, FREELAMBDALI in the last 48 hours., LDH: No results for input(s): LDHCSF, BFSOURCE in the last 48 hours., LFTs:   Recent Labs   Lab 02/09/19  0440 02/10/19  0400   ALT 46* 37   AST 79* 63*   ALKPHOS 59 65   BILITOT 0.5 0.3   PROT 6.4 5.6*   ALBUMIN 2.7* 2.4*   , Reticulocytes: No results for input(s): RETIC in the last 48 hours., Tumor Markers: No results for input(s): PSA, CEA, , AFPTM, [id number],  in the last 48 hours.    Invalid input(s): ALGTM, Uric Acid No results for input(s): URICACID in the last 48 hours., Urine Studies: No results for input(s): COLORU, APPEARANCEUA, PHUR, SPECGRAV, PROTEINUA, GLUCUA, KETONESU, BILIRUBINUA, OCCULTUA, NITRITE, UROBILINOGEN, LEUKOCYTESUR, RBCUA, WBCUA, BACTERIA, SQUAMEPITHEL, HYALINECASTS in the last 48  hours.    Invalid input(s): WRIGHTSUR and All pertinent labs from the last 24 hours have been reviewed.    Diagnostic Results:  EXAMINATION:  FL MODIFIED BARIUM SWALLOW SPEECH STUDY    CLINICAL HISTORY:  repeat to determine diet initiation.;    TECHNIQUE:  Modified Barium Swallow Study. Video fluoroscopic swallowing examination was performed in conjunction with the speech pathology department.  Various liquid and solid food substances were used to assess swallowing.    Fluoroscopy Time: 1.9 minutes    COMPARISON:  Fluoroscopic modified barium swallow 01/28/2019      Impression       Transit: Delayed oral transit time. Significant vallecular and pyriform recess residual/layering.    Penetration/Aspiration: Aspiration following the consumption of liquids.  Significant stasis of puree food with high risk of aspiration.    Other: N/A.    See speech pathology report for further details.    Electronically signed by resident: Latrell Garcia MD  Date: 02/06/2019  Time: 15:43    Electronically signed by: Shay Reddy MD  Date: 02/06/2019

## 2019-02-10 NOTE — ASSESSMENT & PLAN NOTE
Hb 7.0 today (2/10/2019) has been downtrending since Hb 10.3 on admit 1/23  - Iron studies normal Fe with increased ferritin, RDW increased, MCV normal 89  - No signs of melena or hematochezia, no vomits. BUN 24 Cr 0.5, but on high protein tube feeds  - Likely anemia of chronic disease component in setting of malignancy and myositis

## 2019-02-10 NOTE — ASSESSMENT & PLAN NOTE
57yo F with history of L sided infiltrating ductal carcinoma of the L breast (dx on Jan 2017), HTN, depression, and gastritis was send from hem/onc clinic for evaluation of possible inflammatory myositis.      Patient started on Atelizumab/Abraxane on 11/7/18. Per chart review, patient noticed rashes on the dorsum of her hands on 11/11/18. Seen in urgent care and given topical steroid cream. Then received two more infusions on Atelizumab/Abraxane on 11/21/18 and 12/5/18. Started to notice puffiness around the eyes on 12/11/18. Received another Abraxane infusion on 12/12/18 but this time with hydrocortisone 50 mg which helped reduce the swelling around the eyes. Developed swelling of the face again on 12/15/18. Next infusion of Abraxane done on 12/19/18 with Solucortef and Atelizumab held. Abraxane given again on 1/3/19 with no IV steroid. Patient developed swelling of the face on 1/4/19 and went to urgent care and given short course of prednisone 20 mg BID. On 1/15/19, patient seen in hem/onc clinic with c/o of pressure and tightness around neck. CT scan of chest and neck did not reveal any vascular compression. Started on prednisone 60 mg with taper. On 1/22/18 patient with c/o proximal muscle weakness. CPK found to be elevated around 4k. Patient admitted and given solumedrol 80 mg IV on 1/22/18. Last infusion of Atelizumab on 12/19/18. Last infusion of Abraxane on 1/3/19. Given Solumedrol 1g x 1 on 1/23/18. Seen by Dermatology on 1/24/18 and biopsy done of skin rash. Given distribution of rashes, there was concern for dermatomyositis. Patient started on Solumedrol 125 mg IV BID at that time.  Skin biopsy resulted consistent with dermatomyositis.     Labs: ESR 50, CRP 31.1, CPK 4562 (trending down-->166) Aldolase 13.6-->3.2.  , ALT 64.  UA normal.  CBC unremarkable.  +DARCY 1:640 speckled with negative profile.  Complements normal. Normal GGT. RPR, HIV negative. Hep B/C negative.  Strongyloides negative. HMGCR  "antibody negative.  Ferritin elevated at 641, iron on low side at 37, tibc low at 247, transferrin low 167, haptoglobin 153, retic slightly increased at 2.6%, ldh increased at 325. quantTB: indeterminate, T-spot: negative     Spirometry: normal, normal diffusion capacity. FVC: 81%, DLCO: 114%     Case reports of docetaxel related inflammatory myositis had been documented. "...taxanes have been noted to cause disabling but transient arthralgia and myalgias; it is important to consider the possibility of inflammatory myopathy as a possible complication in patients undergoing treatment with these agents." - A case of docetaxel induced myositis and review of literature. Austin et al 2015.     Case reports in Rheumatology   Case reports of atezolizumb (immunomodulator) cause of myositis  - Lucy et al 2017 "Myositis as an adverse even of immune checkpoint blockade for cancer therapy.  Seminars in Arthritis and Rheumatism      Patient exhibits signs of dermatomyositis (heliotropic rash and gottron's papules).   Dermatomyositis can be associated with malignancy.  MRI of LUE showed edema of the deltoid and biceps.  Exam with proximal muscle weakness: b/l deltoids 4/5, b/l iliopsoas 4.4/5. 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     Failed 2nd swallow eval on 2/6/19. PEG placed 2/7/2019.     Inpatient treatments so far:  - Solumedrol 80 mg IV x 1 on 1/22/19  - Solumedrol 1 g IV x 1 on 1/23/19.  - Solumedrol 125 mg IV BID since 1/24/19 -->being tapered down. Now on medrol 48mg since 02/02/19  - IVIG 1/29/19-2/2     Plan:  - chemotherapy can be re-started as it may help treat Dermatomyositis. Would recommend against checkpoint inhibitor, however, as it may have been a trigger for the " dermatomyositis  - continue to monitor CPK, aldolase, AST/ALT  - continue medrol 48mg PO daily on discharge to Rehab  - completed IVIg 400 mg/kg daily x 5 days. Will need IVIG 1 g/kg on 2/28/19.   - should get colonoscopy as outpatient as part of malignancy work-up   - MTX to 20 mg sc weekly (Tuesdays) with daily folic acid.  will need prescription upon discharge and will also need safety labs repeated 4 weeks from now for MTX monitoring.  - follow up myomarker panel.  - recommend Rehab after discharge to help with strengthening.  - recommend ID consult for vaccinations.  - f/u esophageal biopsy results  - PFTs with lung volumes as outpatient.   - 1200 mg dietary calcium and Vitamin D3 1000 mg daily  - continue H2 blocker  - f/u with Dr. Swift and Dr. Campos in Rheumatology upon discharge.

## 2019-02-10 NOTE — PLAN OF CARE
Problem: Adult Inpatient Plan of Care  Goal: Plan of Care Review  Outcome: Ongoing (interventions implemented as appropriate)  Pt AAO.  No complaints this shift.  Denies N, V, D, pain.  Stable vital signs.  Tolerated all medications via peg tube without difficulty.  NPO.  Tube feeding Isosource 65 cc/hr.  No falls or injuries this shift.  Safety precautions in place.  Side rails up x 2.  Bed in lowest position.  Wheels locked.  Call light, bedside table, personal items in reach.  Will continue to monitor.

## 2019-02-10 NOTE — PLAN OF CARE
Problem: Adult Inpatient Plan of Care  Goal: Plan of Care Review  Outcome: Ongoing (interventions implemented as appropriate)  Side rails up x2; call bell in place; bed in lowest, locked position; skid proof socks on; no evidence of skin breakdown; care plan explained to patient; pt remains free of injury. Pt tolerated TF 65cc/hr, residual 15 cc, voids, BM x1, ambulates. Pt denies pain, N/V or dairrhea, VSS and afebrile. Pt with shearing to R and L buttock, barrier cream applied and sacral foam dressing applied. Wound care consult ordered, Dr Patel notified and chg nurse Eric.

## 2019-02-10 NOTE — PROGRESS NOTES
[facility]  Hematology/Oncology  Progress Note    Patient Name: Ermelinda Verde  Admission Date: 1/22/2019  Hospital Length of Stay: 19 days  Code Status: Full Code     Subjective:     HPI:  Ms Verde is a 59 yo F with a prior diagnosis of L sided breast cancer, s/p 3 cycles of nab-paclitaxel and atezolizumab who presents from clinic with a roughly week long, multi-day history of proximal muscle weakness in the shoulder girdle muscles, bilateral and associated with mild tenderness. She reports generalized weakness, but strength impairment with the use of her upper extremities more than lower extremities, and her ADLs are intact. Her last PET scan in December 2018 showed almost complete resolution of her adenopathy, and she had been complaining of a rash, which had been attributed to Nab paclitaxel. Last week, 1/15, she had a CT neck/ since there was concern for facial swelling per symptoms, and the CT showed lymphadenopathy without airway or vascular compromise. 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 Most recently, the patient had been on 60mg of prednisone with a taper and had noted swelling, proximal weakness. She also notes some progressive swallowing difficulty, but attributes this to candidal thrush for which she takes nystatin scheduled. She reports poor PO intake preceding admission, dark, mario colored urine, but denies fevers or joint pain.      Onc History per Dr. Reyna:   She developed a palpable abnormality in her left breast in January 2017 which she noted on self-examination.  A diagnostic mammogram on January 19 showed a greater than 1 cm nodule in the upper outer portion of left breast.  By ultrasound this was lobulated and hypoechoic measuring 1.75 x 1.51 x 1.96 cm.     On January 24,  2017 a core needle biopsy was performed which showed infiltrating ductal carcinoma, high grade.  The tumor was ER negative, TX negative, and HER-2 negative.  A follow-up ultrasound on December 6 showed 2.5 x 2.2 x 1.5 cm left breast mass.  There was no abnormality noted in the left axilla.     She underwent sentinel lymph node biopsy on February 22.  That showed 4 negative lymph nodes.     She had 4 cycles of  Wilbur-adjuvantTaxotere and Cytoxan completed  on 5/9/17.     On June 26 she underwent left mastectomy.  That revealed 2 foci of invasive high-grade carcinoma measuring 14 mm and 1.5 mm.  Margins were negative.     She completed 4 cycles of adjuvant Adriamycin on September 12, 2017.     In October, she developed some left supraclavicular lymphadenopathy which turned out to be recurrence.     A fine-needle aspirate of the lymph node was performed on October 19th.  That showed metastatic carcinoma consistent with breast primary which was ER negative, TX negative and HER 2-negative.           Interval History: NAEO, strength slowly improving as CPK downtrends on steroids. tolerating tube feeds well. Dispo pending rehab placement early this week    Oncology Treatment Plan:   OP BREAST DOCETAXEL Q3W    Medications:  Continuous Infusions:    Scheduled Meds:   amLODIPine  10 mg Per G Tube Daily    enoxaparin  40 mg Subcutaneous Daily    escitalopram oxalate  10 mg Per G Tube Daily    famotidine  20 mg Per G Tube BID    folic acid  1 mg Per G Tube Daily    guaifenesin 100 mg/5 ml  200 mg Per G Tube Q4H    methylPREDNISolone  48 mg Oral Daily     PRN Meds:acetaminophen, ALPRAZolam, dextrose 50%, dextrose 50%, glucagon (human recombinant), glucose, glucose, hepatitis B, hydrALAZINE, insulin aspart U-100, morphine, ondansetron, pneumoc 13-brandin conj-dip cr(PF), polyethylene glycol, promethazine, sodium chloride, sodium chloride 0.9%, sodium chloride 0.9%     Review of Systems   Constitutional: Positive for fatigue.  Negative for activity change, appetite change, chills, diaphoresis, fever and unexpected weight change.   HENT: Positive for facial swelling and trouble swallowing. Negative for congestion, ear discharge, hearing loss, postnasal drip, sinus pressure, sneezing, sore throat and tinnitus.    Eyes: Negative for photophobia, pain and redness.   Respiratory: Negative for apnea, cough, choking, chest tightness, shortness of breath and stridor.    Cardiovascular: Negative for chest pain, palpitations and leg swelling.   Gastrointestinal: Negative for abdominal pain, anal bleeding, constipation, diarrhea, nausea and rectal pain.   Endocrine: Negative for polyuria.   Genitourinary: Negative for dysuria and hematuria.   Musculoskeletal: Negative for arthralgias, back pain, gait problem, joint swelling, myalgias, neck pain and neck stiffness.   Skin: Positive for rash. Negative for color change and pallor.   Allergic/Immunologic: Negative for immunocompromised state.   Neurological: Positive for weakness. Negative for dizziness, seizures and numbness.   Hematological: Positive for adenopathy. Does not bruise/bleed easily.   Psychiatric/Behavioral: Negative for agitation and behavioral problems.     Objective:     Vital Signs (Most Recent):  Temp: 97.9 °F (36.6 °C) (02/10/19 0851)  Pulse: 92 (02/10/19 0851)  Resp: 20 (02/10/19 0851)  BP: (!) 109/57 (02/10/19 0851)  SpO2: 98 % (02/10/19 0851) Vital Signs (24h Range):  Temp:  [97.9 °F (36.6 °C)-98.7 °F (37.1 °C)] 97.9 °F (36.6 °C)  Pulse:  [84-97] 92  Resp:  [18-20] 20  SpO2:  [96 %-99 %] 98 %  BP: (102-118)/(55-64) 109/57     Weight: 73.6 kg (162 lb 5.9 oz)  Body mass index is 27.02 kg/m².  Body surface area is 1.84 meters squared.      Intake/Output Summary (Last 24 hours) at 2/10/2019 0910  Last data filed at 2/10/2019 0540  Gross per 24 hour   Intake 2126 ml   Output 1400 ml   Net 726 ml       Physical Exam   Constitutional: She is oriented to person, place, and time. She  appears well-developed and well-nourished. No distress.   HENT:   Head: Atraumatic.   Nose: Nose normal.   Mouth/Throat: No oropharyngeal exudate.   Eyes: Conjunctivae and EOM are normal. Pupils are equal, round, and reactive to light. Right eye exhibits no discharge. Left eye exhibits no discharge. No scleral icterus.   Neck: Normal range of motion. Neck supple. No tracheal deviation present.   Cardiovascular: Normal rate, regular rhythm and intact distal pulses. Exam reveals no gallop and no friction rub.   Pulmonary/Chest: Effort normal and breath sounds normal. No respiratory distress. She has no wheezes. She has no rales. She exhibits no tenderness.   Abdominal: Soft. Bowel sounds are normal. She exhibits no distension and no mass. There is no tenderness. There is no rebound and no guarding.   PEG tube in situ   Musculoskeletal: Normal range of motion. She exhibits no tenderness or deformity.   Neurological: She is alert and oriented to person, place, and time. No cranial nerve deficit or sensory deficit.     4.5/5 shoulder strength on shoulder abduction; improving  5/5 flexion and extension preserved at elbow   Skin: Skin is warm and dry. Capillary refill takes less than 2 seconds. No rash noted. She is not diaphoretic. No erythema. No pallor.   Psychiatric: She has a normal mood and affect.   Vitals reviewed.      Significant Labs:   BMP:   Recent Labs   Lab 02/09/19 0440 02/10/19  0400   * 106   * 136   K 3.8 3.7    104   CO2 27 28   BUN 19 24*   CREATININE 0.5 0.5   CALCIUM 8.8 8.1*   MG 2.0 1.8   , CBC:   Recent Labs   Lab 02/09/19 0440 02/10/19  0400   WBC 4.38 4.79   HGB 7.5* 7.0*   HCT 23.9* 22.7*    152   , CMP:   Recent Labs   Lab 02/09/19 0440 02/10/19  0400   * 136   K 3.8 3.7    104   CO2 27 28   * 106   BUN 19 24*   CREATININE 0.5 0.5   CALCIUM 8.8 8.1*   PROT 6.4 5.6*   ALBUMIN 2.7* 2.4*   BILITOT 0.5 0.3   ALKPHOS 59 65   AST 79* 63*   ALT 46* 37    ANIONGAP 4* 4*   EGFRNONAA >60.0 >60.0   , Coagulation: No results for input(s): PT, INR, APTT in the last 48 hours., Haptoglobin: No results for input(s): HAPTOGLOBIN in the last 48 hours., Immunology: No results for input(s): SPEP, ERVIN, DARCY, FREELAMBDALI in the last 48 hours., LDH: No results for input(s): LDHCSF, BFSOURCE in the last 48 hours., LFTs:   Recent Labs   Lab 02/09/19  0440 02/10/19  0400   ALT 46* 37   AST 79* 63*   ALKPHOS 59 65   BILITOT 0.5 0.3   PROT 6.4 5.6*   ALBUMIN 2.7* 2.4*   , Reticulocytes: No results for input(s): RETIC in the last 48 hours., Tumor Markers: No results for input(s): PSA, CEA, , AFPTM, [id number],  in the last 48 hours.    Invalid input(s): ALGTM, Uric Acid No results for input(s): URICACID in the last 48 hours., Urine Studies: No results for input(s): COLORU, APPEARANCEUA, PHUR, SPECGRAV, PROTEINUA, GLUCUA, KETONESU, BILIRUBINUA, OCCULTUA, NITRITE, UROBILINOGEN, LEUKOCYTESUR, RBCUA, WBCUA, BACTERIA, SQUAMEPITHEL, HYALINECASTS in the last 48 hours.    Invalid input(s): WRIGHTSUR and All pertinent labs from the last 24 hours have been reviewed.    Diagnostic Results:  EXAMINATION:  FL MODIFIED BARIUM SWALLOW SPEECH STUDY    CLINICAL HISTORY:  repeat to determine diet initiation.;    TECHNIQUE:  Modified Barium Swallow Study. Video fluoroscopic swallowing examination was performed in conjunction with the speech pathology department.  Various liquid and solid food substances were used to assess swallowing.    Fluoroscopy Time: 1.9 minutes    COMPARISON:  Fluoroscopic modified barium swallow 01/28/2019      Impression       Transit: Delayed oral transit time. Significant vallecular and pyriform recess residual/layering.    Penetration/Aspiration: Aspiration following the consumption of liquids.  Significant stasis of puree food with high risk of aspiration.    Other: N/A.    See speech pathology report for further details.    Electronically signed by resident: Latrell  MD Jose  Date: 02/06/2019  Time: 15:43    Electronically signed by: Shay Reddy MD  Date: 02/06/2019         Assessment/Plan:     * Myositis    - DDX dermatomyositis de haylie vs related to immunotherapy  - trend CPK daily; downtrending generally with a slight bump on 01/27  -restarted fluids 01/26 due to NPO  - myositis labs suspicious for myosits; rheum on board.   - F/u 1/24 skin biopsy.  -MRI left humerus suspicious for myositis    Per rheumatology  - Methylpred 125mg IV bid started, now tapered to 24mg IV bid, transitioned to PO medrol 48mg daily via PEG. Taper per rheumatology  - MTX 15mg subQ qWeekly , now 20mg qWeekly  - IVIG x5 days completed 2/3/19 will require 1x maintenance dose 4 weeks from 2/3 per rheum  - myomarker panel, HMGCR, QG-TB pending  - PFTs as OP  - Quantitative IgA 533, IgG 932, IgM 66  - ID consulted for fast track vacc  - f/u with Dr. Swift and Dr. Campos in Rheumatology at discharge  - DISPO: pending d/c to rehab    Lab Results   Component Value Date     (H) 02/09/2019     (H) 02/08/2019     (H) 02/07/2019     (H) 02/06/2019     (H) 02/05/2019          Anemia    Hb 7.0 today (2/10/2019) has been downtrending since Hb 10.3 on admit 1/23  - Iron studies normal Fe with increased ferritin, RDW increased, MCV normal 89  - No signs of melena or hematochezia, no vomits. BUN 24 Cr 0.5, but on high protein tube feeds  - Likely anemia of chronic disease component in setting of malignancy and myositis     Hypokalemia    Replete prn     Dysphagia    Patient is experiencing difficulty swallowing that has been increasing in severity over last couple of days to the point where patient did not feel as if she could swallow.  As of 01/26, SLP evaluated patient and determined that she should be NPO except for medications until a modified barium swallow could be performed. Possibly candidal esophagitis; patient has oral thrush.    Plan:  - High aspiration risk on  modified barium study, strict NPO  - NGT in situ, tube feeds initiated per dietary consult  - Switched to IV meds where possible  - Fluconazole IVPB 200mg q24hr (now d/c'd)  - GI consulted, EGD resulted w/ Grade C erosive esophagitis, no candidal infxn identified. Bx obtained, GI recs start PPI bid  - Failed swallow study after NGT removed  - PEG completed 2/7  - Dietary/nutrition consult for tube feeds and education  - Tube feedings started 2/8, tolerating well. Continue to titrate  - Medications per PEG tube  - Can reverse PEG when swallowing is passable  - SLP rehab services     Swelling of upper arm    -B/L U/S negative for DVTs  -appears less edematous than day prior  -continue to monitor     Dermatitis    - Skin biopsy from 1/24 pending, appreciate dermatology recs     Candidiasis    Oral thrush  -cont Nystatin swish and swallow. Additionally, added fluconaozle 200 mg Po qday on 01/26 as possibility patient is experiencing esophagitis 2/2 to candida  -appears resolved now s/p fluconazole and nystatin     Rhabdomyolysis    -see myositis. Continue supportive care, Cr/ electrolyte/ CPK monitoring and aggressive hydration. transaminitis on CMP is likely due to skeletal muscle rhabdo, not hepatic     Drug rash    - Unclear if symptoms related to chemotherapy     Pre-diabetes    -check a1c, 0-5u SSI while on steroids, add prandial insulin as warranted.      Adjustment disorder with depressed mood    -cont home SSRI     Vitamin B12 deficiency    -cont home B12 supplementation      Breast cancer of upper-outer quadrant of left female breast    -On January 24, 2017 a core needle biopsy was performed which showed infiltrating ductal carcinoma, high grade.  The tumor was ER negative, DE negative, and HER-2 negative.  -She had 4 cycles of  Wilbur-adjuvantTaxotere and Cytoxan completed  on 5/9/17.  -On June 26 she underwent left mastectomy.  That revealed 2 foci of invasive high-grade carcinoma measuring 14 mm and 1.5 mm.   Margins were negative.  -She completed 4 cycles of adjuvant Adriamycin on September 12, 2017.  -  A fine-needle aspirate of the lymph node was performed on October 19th.  That showed metastatic carcinoma consistent with breast primary which was ER negative, NV negative and HER 2-negative.    -Per rheumatology: hold paclitaxel agent and atezolizumb at this time - both can cause myositis      Hypertension    -on home amlodipine-benazepril; will hold ACEi for now and observe renal function with CPK elevation in rhabdo.     Plan:  - Amlodipine 10 mg qday on admission  - 1/28 Pt now strict NPO due to failed swallow study, now on hydralazine 10mg IV q8h WCTM and adjust PRN              Noah Patel MD  Hematology/Oncology  [facility]      ATTENDING NOTE, ONCOLOGY INPATIENT TEAM    As above; events of last 24 hours noted.  Patient seen and examined, chart reviewed.  Appears comfortable, in NAD.  She is tolerating the tube feedings well.  Lungs are clear to auscultation.  Abdomen is soft, nontender.  Labs reviewed.  Hg is 7 gr/dl today.      PLAN  Minimize blood draws; will only obtain a CBC tomorrow.  Continue steroids and weekly MTX..  Continue enoxaparin.  Continue tube feedings.  PT/OT  Awaiting transfer to the rehab facility next week.      Ashwin Stewart MD

## 2019-02-11 PROBLEM — R23.9 ALTERATION IN SKIN INTEGRITY: Status: ACTIVE | Noted: 2019-02-11

## 2019-02-11 LAB
ALDOLASE SERPL-CCNC: 3.2 U/L
ALDOLASE SERPL-CCNC: 5 U/L
BASOPHILS # BLD AUTO: 0 K/UL
BASOPHILS NFR BLD: 0 %
DIFFERENTIAL METHOD: ABNORMAL
EJ AB SER QL: NOT DETECTED
ENA JO1 AB SER IA-ACNC: <1 INDEX
EOSINOPHIL # BLD AUTO: 0 K/UL
EOSINOPHIL NFR BLD: 0.2 %
ERYTHROCYTE [DISTWIDTH] IN BLOOD BY AUTOMATED COUNT: 20.1 %
HCT VFR BLD AUTO: 22.7 %
HGB BLD-MCNC: 7.2 G/DL
IMM GRANULOCYTES # BLD AUTO: 0.02 K/UL
IMM GRANULOCYTES NFR BLD AUTO: 0.4 %
KU AB SER QL: NOT DETECTED
LYMPHOCYTES # BLD AUTO: 0.7 K/UL
LYMPHOCYTES NFR BLD: 16 %
MCH RBC QN AUTO: 28.9 PG
MCHC RBC AUTO-ENTMCNC: 31.7 G/DL
MCV RBC AUTO: 91 FL
MI2 AB SER QL: NOT DETECTED
MONOCYTES # BLD AUTO: 0.3 K/UL
MONOCYTES NFR BLD: 5.7 %
NEUTROPHILS # BLD AUTO: 3.6 K/UL
NEUTROPHILS NFR BLD: 77.7 %
NRBC BLD-RTO: 0 /100 WBC
OJ AB SER QL: NOT DETECTED
PL12 AB SER QL: NOT DETECTED
PL7 AB SER QL: NOT DETECTED
PLATELET # BLD AUTO: 161 K/UL
PMV BLD AUTO: 9.6 FL
RBC # BLD AUTO: 2.49 M/UL
SRP AB SERPL QL: NOT DETECTED
T-SPOT TB SCREENING TEST: NORMAL
WBC # BLD AUTO: 4.57 K/UL

## 2019-02-11 PROCEDURE — 99232 PR SUBSEQUENT HOSPITAL CARE,LEVL II: ICD-10-PCS | Mod: ,,, | Performed by: NURSE PRACTITIONER

## 2019-02-11 PROCEDURE — 25000003 PHARM REV CODE 250: Performed by: STUDENT IN AN ORGANIZED HEALTH CARE EDUCATION/TRAINING PROGRAM

## 2019-02-11 PROCEDURE — 63600175 PHARM REV CODE 636 W HCPCS: Performed by: STUDENT IN AN ORGANIZED HEALTH CARE EDUCATION/TRAINING PROGRAM

## 2019-02-11 PROCEDURE — 97110 THERAPEUTIC EXERCISES: CPT

## 2019-02-11 PROCEDURE — 99233 SBSQ HOSP IP/OBS HIGH 50: CPT | Mod: ,,, | Performed by: INTERNAL MEDICINE

## 2019-02-11 PROCEDURE — 99233 PR SUBSEQUENT HOSPITAL CARE,LEVL III: ICD-10-PCS | Mod: ,,, | Performed by: INTERNAL MEDICINE

## 2019-02-11 PROCEDURE — 99232 SBSQ HOSP IP/OBS MODERATE 35: CPT | Mod: ,,, | Performed by: NURSE PRACTITIONER

## 2019-02-11 PROCEDURE — 63600175 PHARM REV CODE 636 W HCPCS: Performed by: INTERNAL MEDICINE

## 2019-02-11 PROCEDURE — 20600001 HC STEP DOWN PRIVATE ROOM

## 2019-02-11 PROCEDURE — 85025 COMPLETE CBC W/AUTO DIFF WBC: CPT

## 2019-02-11 PROCEDURE — 97116 GAIT TRAINING THERAPY: CPT

## 2019-02-11 RX ADMIN — METHYLPREDNISOLONE 48 MG: 4 TABLET ORAL at 09:02

## 2019-02-11 RX ADMIN — ENOXAPARIN SODIUM 40 MG: 100 INJECTION SUBCUTANEOUS at 05:02

## 2019-02-11 RX ADMIN — FAMOTIDINE 20 MG: 20 TABLET ORAL at 09:02

## 2019-02-11 RX ADMIN — GUAIFENESIN 200 MG: 100 SOLUTION ORAL at 10:02

## 2019-02-11 RX ADMIN — GUAIFENESIN 200 MG: 100 SOLUTION ORAL at 05:02

## 2019-02-11 RX ADMIN — GUAIFENESIN 200 MG: 100 SOLUTION ORAL at 01:02

## 2019-02-11 RX ADMIN — FAMOTIDINE 20 MG: 20 TABLET ORAL at 10:02

## 2019-02-11 RX ADMIN — GUAIFENESIN 200 MG: 100 SOLUTION ORAL at 09:02

## 2019-02-11 RX ADMIN — ESCITALOPRAM OXALATE 10 MG: 5 TABLET, FILM COATED ORAL at 09:02

## 2019-02-11 RX ADMIN — AMLODIPINE BESYLATE 10 MG: 10 TABLET ORAL at 09:02

## 2019-02-11 RX ADMIN — FOLIC ACID 1 MG: 1 TABLET ORAL at 09:02

## 2019-02-11 NOTE — PT/OT/SLP PROGRESS
"Physical Therapy Treatment    Patient Name:  Ermelinda Verde   MRN:  [id number]    Recommendations:     Discharge Recommendations:  rehabilitation facility   Discharge Equipment Recommendations: walker, rolling   Barriers to discharge: None    Assessment:     Ermelinda Verde is a 58 y.o. female admitted with a medical diagnosis of Myositis.  She presents with the following impairments/functional limitations:  weakness, impaired endurance, decreased safety awareness, decreased lower extremity function, gait instability, impaired balance, decreased upper extremity function.    Pt is demonstrating mm weakness throughout, being NPO for the past 2+ weeks.  Compliant with walking program, pt only feels comfortable to amb with another person present. Unable to progress HEP at this time sec to fatigue and low tolerance to therex.  Concerns regarding skin breakdown in the sacral region.      Rehab Prognosis: Good; patient would benefit from acute skilled PT services to address these deficits and reach maximum level of function.    Recent Surgery: Procedure(s) (LRB):  INSERTION, PEG TUBE (N/A) 4 Days Post-Op    Plan:     During this hospitalization, patient to be seen 3 x/week to address the identified rehab impairments via gait training, therapeutic activities, therapeutic exercises, neuromuscular re-education and progress toward the following goals:    · Plan of Care Expires:  02/22/19    Subjective     Chief Complaint: UE mm weakness  Patient/Family Comments/goals: To eat  Pain/Comfort:  · Pain Rating 1: 0/10      Objective:     Communicated with nursing prior to session.  Patient found all lines intact and call button in reach PEG Tube  upon PT entry to room.     "I'm just trying to get used to it."    General Precautions: Standard, fall, aspiration, NPO   Orthopedic Precautions:N/A   Braces: N/A     Functional Mobility:  · Bed Mobility:     · Scooting: independence  · Supine to Sit: independence  · Sit to Supine: " independence  · Transfers:     · Sit to Stand:  independence with no AD  · Bed to Chair: stand by assistance with  no AD  using  Stand Pivot  · Gait: Pt amb 462', S, with A to push IV pole sec to pt fatigue  · Balance: S: dynamic standing balance without AD      AM-PAC 6 CLICK MOBILITY  Turning over in bed (including adjusting bedclothes, sheets and blankets)?: 4  Sitting down on and standing up from a chair with arms (e.g., wheelchair, bedside commode, etc.): 4  Moving from lying on back to sitting on the side of the bed?: 4  Moving to and from a bed to a chair (including a wheelchair)?: 4  Need to walk in hospital room?: 3  Climbing 3-5 steps with a railing?: 3  Basic Mobility Total Score: 22       Therapeutic Activities and Exercises:   Whiteboard updated  Heel raises: 20 reps, in stance, with IV pole for support  Marchin reps, in stance, with IV pole for support  Hip abd: 10 reps, in stance, with IV pole for support  Wall push ups: 10 reps, in stance, minimal ROM  Neck rotation and flexion: 20 reps, perf B, in long sit with head supported by pillow  Ed on pressure relief    Patient left supine with all lines intact, call button in reach and nursing notified.    GOALS:   Multidisciplinary Problems     Physical Therapy Goals        Problem: Physical Therapy Goal    Goal Priority Disciplines Outcome Goal Variances Interventions   Physical Therapy Goal     PT, PT/OT Ongoing (interventions implemented as appropriate)     Description:  Goals to be met by: 19     Patient will increase functional independence with mobility by performin. Gait  x 500 feet with Supervision without device. - GOAL MET  2. Standing lower extremity exercise program x 15 reps per handout, with supervision. - GOAL MET    3. Increased functional strength to 5/5 for BLE.  4. Pt to perf 6MWT: amb 580', to demonstrate a clinically significant improvement in aerobic capacity.    5. Pt to perf 10x sit<>stand: 2 min 45 sec, to  demonstrate improvements in B LE mm strength.                          Time Tracking:     PT Received On: 02/11/19  PT Start Time: 1131     PT Stop Time: 1200  PT Total Time (min): 29 min     Billable Minutes: Gait Training 11 and Therapeutic Exercise 16    Treatment Type: Treatment  PT/PTA: PT           Lotus Camacho, PT  02/11/2019

## 2019-02-11 NOTE — PROGRESS NOTES
Wound care consult per RN for sacral sheering. Chart reflects that the pt has left sided breast cancer and was admitted on 1/22/19 with Myositis. Oncology continues to follow pt and per Case Management, pt is in the process of pending approval for transferring to [facility].    Upon assessment of Left Buttocks: Partial thickness wound complicated by shearing noted to left buttock. Pink granulating tissue with islands of epithelial tissue with minimal slough and scant serous drainage noted.     To Right Buttocks: Partial thickness wound complicated by shearing noted to right buttock. Pink granulating tissue with islands of epithelial tissue with minimal slough and scant serous drainage noted. (see photos and assessment below)    Recommendations:  -Nursing to cleanse Sacrum with cleansing wipes and apply TRIAD ointment BID and prn cleaning. Triad ointment provides a hydrophilic environment to promote healing of exposed tissue and prevents breakdown of intact skin. Left triad ointment at bedside.  -Pressure injury prevention interventions to include repositioning with wedge pillow, chair cushion, and waffle cushion overlay. Instructed pt to take waffle overlay and chair cushion home with her upon discharge.    Discussed plan of care with pt', pt's nurse and messaged Dr. Ashwin Stewart via secure chat.    Wound care will continue to follow PRN.           02/11/19 1213       Wound 02/09/19 1615 Shearing Buttocks   Date First Assessed/Time First Assessed: 02/09/19 1615   Pre-existing: No  Primary Wound Type: Shearing  Side: Left  Location: Buttocks   Wound Image    Wound WDL ex   Dressing Appearance Open to air   Drainage Amount Scant   Drainage Characteristics/Odor Serous;No odor   Appearance Pink;Slough;Moist   Tissue loss description Partial thickness   Periwound Area Intact   Wound Edges Open   Wound Length (cm) 3 cm   Wound Width (cm) 1 cm   Wound Depth (cm) 0.1 cm   Wound Volume (cm^3) 0.3 cm^3   Wound Surface  Area (cm^2) 3 cm^2   Care Cleansed with:;Sterile normal saline   Dressing Other (see comments)  (TRIAD)   Periwound Care Moisture barrier applied   Dressing Change Due 02/12/19       Wound 02/09/19 1615 Shearing Buttocks   Date First Assessed/Time First Assessed: 02/09/19 1615   Pre-existing: No  Primary Wound Type: Shearing  Side: Right  Location: Buttocks   Wound Image    Wound WDL ex   Dressing Appearance Open to air   Drainage Amount Scant   Drainage Characteristics/Odor Serous;No odor   Appearance Pink;Slough   Tissue loss description Partial thickness   Periwound Area Intact   Wound Edges Open   Wound Length (cm) 3 cm   Wound Width (cm) 1 cm   Wound Depth (cm) 0.1 cm   Wound Volume (cm^3) 0.3 cm^3   Wound Surface Area (cm^2) 3 cm^2   Care Cleansed with:;Sterile normal saline   Dressing Other (see comments)  (TRIAD)   Periwound Care Moisture barrier applied   Dressing Change Due 02/12/19     Sacrum

## 2019-02-11 NOTE — PLAN OF CARE
MDRs completed with Dr. Obregon and the team. VSS. T. Max 100.1. Tube feeds at goal rate of 65 mL/hr per PEG tube. Patient to receive next subQ MTX injection tomorrow 2/12/19. PT/OT ordered to eval and treat. PT/OT recommended inpatient rehab. Patient accepted to [facility] pending insurance authorization. MD discussed plan with patient. Patient verbalized understanding. CM to continue to follow with the team. SW following up with rehab regarding auth.    Nicole Carter, RN, BSN, CM  [facility]  Nurse - Med Onc/Gyn Onc  [phone]

## 2019-02-11 NOTE — PROGRESS NOTES
[facility]  Hematology/Oncology  Progress Note    Patient Name: Ermelinda Verde  Admission Date: 1/22/2019  Hospital Length of Stay: 20 days  Code Status: Full Code     Subjective:     HPI:  Ms Verde is a 59 yo F with a prior diagnosis of L sided breast cancer, s/p 3 cycles of nab-paclitaxel and atezolizumab who presents from clinic with a roughly week long, multi-day history of proximal muscle weakness in the shoulder girdle muscles, bilateral and associated with mild tenderness. She reports generalized weakness, but strength impairment with the use of her upper extremities more than lower extremities, and her ADLs are intact. Her last PET scan in December 2018 showed almost complete resolution of her adenopathy, and she had been complaining of a rash, which had been attributed to Nab paclitaxel. Last week, 1/15, she had a CT neck/ since there was concern for facial swelling per symptoms, and the CT showed lymphadenopathy without airway or vascular compromise. 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 Most recently, the patient had been on 60mg of prednisone with a taper and had noted swelling, proximal weakness. She also notes some progressive swallowing difficulty, but attributes this to candidal thrush for which she takes nystatin scheduled. She reports poor PO intake preceding admission, dark, mario colored urine, but denies fevers or joint pain.      Onc History per Dr. Reyna:   She developed a palpable abnormality in her left breast in January 2017 which she noted on self-examination.  A diagnostic mammogram on January 19 showed a greater than 1 cm nodule in the upper outer portion of left breast.  By ultrasound this was lobulated and hypoechoic measuring 1.75 x 1.51 x 1.96 cm.     On January 24,  2017 a core needle biopsy was performed which showed infiltrating ductal carcinoma, high grade.  The tumor was ER negative, NM negative, and HER-2 negative.  A follow-up ultrasound on December 6 showed 2.5 x 2.2 x 1.5 cm left breast mass.  There was no abnormality noted in the left axilla.     She underwent sentinel lymph node biopsy on February 22.  That showed 4 negative lymph nodes.     She had 4 cycles of  Wilbur-adjuvantTaxotere and Cytoxan completed  on 5/9/17.     On June 26 she underwent left mastectomy.  That revealed 2 foci of invasive high-grade carcinoma measuring 14 mm and 1.5 mm.  Margins were negative.     She completed 4 cycles of adjuvant Adriamycin on September 12, 2017.     In October, she developed some left supraclavicular lymphadenopathy which turned out to be recurrence.     A fine-needle aspirate of the lymph node was performed on October 19th.  That showed metastatic carcinoma consistent with breast primary which was ER negative, NM negative and HER 2-negative.           Interval History: NAEON, strength slowly improving as CPK downtrends on steroids. tolerating tube feeds well. Dispo pending rehab placement early this week.    Oncology Treatment Plan:   OP BREAST DOCETAXEL Q3W    Medications:  Continuous Infusions:    Scheduled Meds:   amLODIPine  10 mg Per G Tube Daily    enoxaparin  40 mg Subcutaneous Daily    escitalopram oxalate  10 mg Per G Tube Daily    famotidine  20 mg Per G Tube BID    folic acid  1 mg Per G Tube Daily    guaifenesin 100 mg/5 ml  200 mg Per G Tube Q4H    methylPREDNISolone  48 mg Oral Daily     PRN Meds:acetaminophen, ALPRAZolam, dextrose 50%, dextrose 50%, glucagon (human recombinant), glucose, glucose, hepatitis B, hydrALAZINE, insulin aspart U-100, morphine, ondansetron, pneumoc 13-brandin conj-dip cr(PF), polyethylene glycol, promethazine, sodium chloride, sodium chloride 0.9%, sodium chloride 0.9%     Review of Systems   Constitutional: Positive for fatigue.  Negative for activity change, appetite change, chills, diaphoresis, fever and unexpected weight change.   HENT: Positive for facial swelling and trouble swallowing. Negative for congestion, ear discharge, hearing loss, postnasal drip, sinus pressure, sneezing, sore throat and tinnitus.    Eyes: Negative for photophobia, pain and redness.   Respiratory: Negative for apnea, cough, choking, chest tightness, shortness of breath and stridor.    Cardiovascular: Negative for chest pain, palpitations and leg swelling.   Gastrointestinal: Negative for abdominal pain, anal bleeding, constipation, diarrhea, nausea and rectal pain.   Endocrine: Negative for polyuria.   Genitourinary: Negative for dysuria and hematuria.   Musculoskeletal: Negative for arthralgias, back pain, gait problem, joint swelling, myalgias, neck pain and neck stiffness.   Skin: Positive for rash. Negative for color change and pallor.   Allergic/Immunologic: Negative for immunocompromised state.   Neurological: Positive for weakness. Negative for dizziness, seizures and numbness.   Hematological: Positive for adenopathy. Does not bruise/bleed easily.   Psychiatric/Behavioral: Negative for agitation and behavioral problems.     Objective:     Vital Signs (Most Recent):  Temp: 98.3 °F (36.8 °C) (02/11/19 0809)  Pulse: 90 (02/11/19 0809)  Resp: 19 (02/11/19 0809)  BP: (!) 105/55 (02/11/19 0809)  SpO2: 99 % (02/11/19 0809) Vital Signs (24h Range):  Temp:  [97.5 °F (36.4 °C)-99.4 °F (37.4 °C)] 98.3 °F (36.8 °C)  Pulse:  [] 90  Resp:  [16-20] 19  SpO2:  [94 %-100 %] 99 %  BP: (105-128)/(55-68) 105/55     Weight: 73.7 kg (162 lb 7.7 oz)  Body mass index is 27.04 kg/m².  Body surface area is 1.84 meters squared.      Intake/Output Summary (Last 24 hours) at 2/11/2019 0842  Last data filed at 2/11/2019 0513  Gross per 24 hour   Intake 1282 ml   Output 1400 ml   Net -118 ml       Physical Exam   Constitutional: She is oriented to person, place, and time. She  appears well-developed and well-nourished. No distress.   HENT:   Head: Atraumatic.   Nose: Nose normal.   Mouth/Throat: No oropharyngeal exudate.   Eyes: Conjunctivae and EOM are normal. Pupils are equal, round, and reactive to light. Right eye exhibits no discharge. Left eye exhibits no discharge. No scleral icterus.   Neck: Normal range of motion. Neck supple. No tracheal deviation present.   Cardiovascular: Normal rate, regular rhythm and intact distal pulses. Exam reveals no gallop and no friction rub.   Pulmonary/Chest: Effort normal and breath sounds normal. No respiratory distress. She has no wheezes. She has no rales. She exhibits no tenderness.   Abdominal: Soft. Bowel sounds are normal. She exhibits no distension and no mass. There is no tenderness. There is no rebound and no guarding.   PEG tube in situ   Musculoskeletal: Normal range of motion. She exhibits no tenderness or deformity.   Neurological: She is alert and oriented to person, place, and time. No cranial nerve deficit or sensory deficit.     4.5/5 shoulder strength on shoulder abduction; improving  5/5 flexion and extension preserved at elbow   Skin: Skin is warm and dry. Capillary refill takes less than 2 seconds. No rash noted. She is not diaphoretic. No erythema. No pallor.   Psychiatric: She has a normal mood and affect.   Vitals reviewed.      Significant Labs:   BMP:   Recent Labs   Lab 02/10/19  0400         K 3.7      CO2 28   BUN 24*   CREATININE 0.5   CALCIUM 8.1*   MG 1.8   , CBC:   Recent Labs   Lab 02/10/19  0400 02/11/19  0400   WBC 4.79 4.57   HGB 7.0* 7.2*   HCT 22.7* 22.7*    161   , CMP:   Recent Labs   Lab 02/10/19  0400      K 3.7      CO2 28      BUN 24*   CREATININE 0.5   CALCIUM 8.1*   PROT 5.6*   ALBUMIN 2.4*   BILITOT 0.3   ALKPHOS 65   AST 63*   ALT 37   ANIONGAP 4*   EGFRNONAA >60.0   , Coagulation: No results for input(s): PT, INR, APTT in the last 48 hours., Haptoglobin:  No results for input(s): HAPTOGLOBIN in the last 48 hours., Immunology: No results for input(s): SPEP, ERVIN, DARCY, FREELAMBDALI in the last 48 hours., LDH: No results for input(s): LDHCSF, BFSOURCE in the last 48 hours., LFTs:   Recent Labs   Lab 02/10/19  0400   ALT 37   AST 63*   ALKPHOS 65   BILITOT 0.3   PROT 5.6*   ALBUMIN 2.4*   , Reticulocytes:   Recent Labs   Lab 02/10/19  0400   RETIC 3.0*   , Tumor Markers: No results for input(s): PSA, CEA, , AFPTM, UO0330,  in the last 48 hours.    Invalid input(s): ALGTM, Uric Acid No results for input(s): URICACID in the last 48 hours., Urine Studies: No results for input(s): COLORU, APPEARANCEUA, PHUR, SPECGRAV, PROTEINUA, GLUCUA, KETONESU, BILIRUBINUA, OCCULTUA, NITRITE, UROBILINOGEN, LEUKOCYTESUR, RBCUA, WBCUA, BACTERIA, SQUAMEPITHEL, HYALINECASTS in the last 48 hours.    Invalid input(s): WRIGHTSUR and All pertinent labs from the last 24 hours have been reviewed.    Diagnostic Results:  EXAMINATION:  FL MODIFIED BARIUM SWALLOW SPEECH STUDY    CLINICAL HISTORY:  repeat to determine diet initiation.;    TECHNIQUE:  Modified Barium Swallow Study. Video fluoroscopic swallowing examination was performed in conjunction with the speech pathology department.  Various liquid and solid food substances were used to assess swallowing.    Fluoroscopy Time: 1.9 minutes    COMPARISON:  Fluoroscopic modified barium swallow 01/28/2019      Impression       Transit: Delayed oral transit time. Significant vallecular and pyriform recess residual/layering.    Penetration/Aspiration: Aspiration following the consumption of liquids.  Significant stasis of puree food with high risk of aspiration.    Other: N/A.    See speech pathology report for further details.    Electronically signed by resident: Latrell Garcia MD  Date: 02/06/2019  Time: 15:43    Electronically signed by: Shay Reddy MD  Date: 02/06/2019         Assessment/Plan:     * Myositis    - DDX dermatomyositis de haylie  vs related to immunotherapy  - trend CPK daily; downtrending generally with a slight bump on 01/27  -restarted fluids 01/26 due to NPO  - myositis labs suspicious for myosits; rheum on board.   - F/u 1/24 skin biopsy.  -MRI left humerus suspicious for myositis    Per rheumatology  - Methylpred 125mg IV bid started, now tapered to 24mg IV bid, transitioned to PO medrol 48mg daily via PEG. Taper per rheumatology  - MTX 15mg subQ qWeekly , now 20mg qWeekly  - IVIG x5 days completed 2/3/19 will require 1x maintenance dose 4 weeks from 2/3 per rheum  - myomarker panel, HMGCR, QG-TB pending  - PFTs as OP  - Quantitative IgA 533, IgG 932, IgM 66  - ID consulted for fast track vacc  - f/u with Dr. Swift and Dr. Campos in Rheumatology at discharge  - DISPO: pending d/c to rehab    Lab Results   Component Value Date     (H) 02/09/2019     (H) 02/08/2019     (H) 02/07/2019     (H) 02/06/2019     (H) 02/05/2019          Anemia    Hb 7.0 today (2/10/2019) has been downtrending since Hb 10.3 on admit 1/23  - Iron studies normal Fe with increased ferritin, RDW increased, MCV normal 89  - No signs of melena or hematochezia, no vomits. BUN 24 Cr 0.5, but on high protein tube feeds  - Likely anemia of chronic disease component in setting of malignancy and myositis     Hypokalemia    Replete prn     Dysphagia    Patient is experiencing difficulty swallowing that has been increasing in severity over last couple of days to the point where patient did not feel as if she could swallow.  As of 01/26, SLP evaluated patient and determined that she should be NPO except for medications until a modified barium swallow could be performed. Possibly candidal esophagitis; patient has oral thrush.    Plan:  - High aspiration risk on modified barium study, strict NPO  - NGT in situ, tube feeds initiated per dietary consult  - Switched to IV meds where possible  - Fluconazole IVPB 200mg q24hr (now d/c'd)  - GI  consulted, EGD resulted w/ Grade C erosive esophagitis, no candidal infxn identified. Bx obtained, GI recs start PPI bid  - Failed swallow study after NGT removed  - PEG completed 2/7  - Dietary/nutrition consult for tube feeds and education  - Tube feedings started 2/8, tolerating well. Continue to titrate  - Medications per PEG tube  - Can reverse PEG when swallowing is passable  - SLP rehab services     Swelling of upper arm    -B/L U/S negative for DVTs  -appears less edematous than day prior  -continue to monitor     Dermatitis    - Skin biopsy from 1/24 pending, appreciate dermatology recs     Candidiasis    Oral thrush  -cont Nystatin swish and swallow. Additionally, added fluconaozle 200 mg Po qday on 01/26 as possibility patient is experiencing esophagitis 2/2 to candida  -appears resolved now s/p fluconazole and nystatin     Rhabdomyolysis    -see myositis. Continue supportive care, Cr/ electrolyte/ CPK monitoring and aggressive hydration. transaminitis on CMP is likely due to skeletal muscle rhabdo, not hepatic     Drug rash    - Unclear if symptoms related to chemotherapy     Pre-diabetes    -check a1c, 0-5u SSI while on steroids, add prandial insulin as warranted.      Adjustment disorder with depressed mood    -cont home SSRI     Vitamin B12 deficiency    -cont home B12 supplementation      Breast cancer of upper-outer quadrant of left female breast    -On January 24, 2017 a core needle biopsy was performed which showed infiltrating ductal carcinoma, high grade.  The tumor was ER negative, DE negative, and HER-2 negative.  -She had 4 cycles of  Wilbur-adjuvantTaxotere and Cytoxan completed  on 5/9/17.  -On June 26 she underwent left mastectomy.  That revealed 2 foci of invasive high-grade carcinoma measuring 14 mm and 1.5 mm.  Margins were negative.  -She completed 4 cycles of adjuvant Adriamycin on September 12, 2017.  -  A fine-needle aspirate of the lymph node was performed on October 19th.  That  showed metastatic carcinoma consistent with breast primary which was ER negative, IN negative and HER 2-negative.    -Per rheumatology: hold paclitaxel agent and atezolizumb at this time - both can cause myositis      Hypertension    -on home amlodipine-benazepril; will hold ACEi for now and observe renal function with CPK elevation in rhabdo.     Plan:  - Amlodipine 10 mg qday on admission  - 1/28 Pt now strict NPO due to failed swallow study, now on hydralazine 10mg IV q8h WCTM and adjust PRN              Nuno Prasad MD  Hematology/Oncology  [facility]        ATTENDING NOTE, ONCOLOGY INPATIENT TEAM    As above; events of last 24 hours noted.  Patient seen and examined, chart reviewed.  Appears comfortable, in NAD.  Lungs are clear to auscultation.  Abdomen is soft, nontender.  Labs reviewed.    PLAN  Follow follow labs QOD  Continue steroids.  She should receive her methotrexate tomorrow.  PT/OT.  Continue enoxaparin for DVT prophylaxis.        Ashwin Stewart MD

## 2019-02-11 NOTE — SUBJECTIVE & OBJECTIVE
Interval History 2/11/2019:  Patient is seen for follow-up rehab evaluation and recommendations: Participating with therapy.     HPI, Past Medical, Family, and Social History remains the same as documented in the initial encounter.    Scheduled Medications:    amLODIPine  10 mg Per G Tube Daily    enoxaparin  40 mg Subcutaneous Daily    escitalopram oxalate  10 mg Per G Tube Daily    famotidine  20 mg Per G Tube BID    folic acid  1 mg Per G Tube Daily    guaifenesin 100 mg/5 ml  200 mg Per G Tube Q4H    methylPREDNISolone  48 mg Oral Daily       Diagnostic Results: Labs: Reviewed    PRN Medications: acetaminophen, ALPRAZolam, dextrose 50%, dextrose 50%, glucagon (human recombinant), glucose, glucose, hepatitis B, hydrALAZINE, insulin aspart U-100, morphine, ondansetron, pneumoc 13-brandin conj-dip cr(PF), polyethylene glycol, promethazine, sodium chloride, sodium chloride 0.9%, sodium chloride 0.9%    Review of Systems   Constitutional: Positive for activity change. Negative for fatigue and fever.   HENT: Positive for trouble swallowing. Negative for voice change.    Musculoskeletal: Positive for gait problem and myalgias.   Skin: Positive for rash (improving). Negative for color change.   Neurological: Positive for weakness. Negative for numbness.   Psychiatric/Behavioral: Negative for agitation and confusion.     Objective:     Vital Signs (Most Recent):  Temp: 98.3 °F (36.8 °C) (02/11/19 0809)  Pulse: 90 (02/11/19 0809)  Resp: 19 (02/11/19 0809)  BP: (!) 105/55 (02/11/19 0809)  SpO2: 99 % (02/11/19 0809)    Vital Signs (24h Range):  Temp:  [97.5 °F (36.4 °C)-99.4 °F (37.4 °C)] 98.3 °F (36.8 °C)  Pulse:  [] 90  Resp:  [16-20] 19  SpO2:  [94 %-100 %] 99 %  BP: (105-128)/(55-68) 105/55     Physical Exam   Constitutional: She is oriented to person, place, and time. She appears well-developed and well-nourished.   HENT:   Head: Normocephalic and atraumatic.   Eyes: Right eye exhibits no discharge. Left eye  exhibits no discharge.   Neck: Neck supple.   Cardiovascular: Intact distal pulses.   Pulmonary/Chest: Effort normal. No respiratory distress.   Abdominal: Soft. There is no tenderness.   Musculoskeletal: She exhibits no deformity.   RUE: 3+/5.  LUE: 3+/5.  RLE: 3/5.  LLE: 4/5-all proximal strength      Neurological: She is alert and oriented to person, place, and time. No sensory deficit.   Skin: Skin is warm and dry.   Psychiatric: She has a normal mood and affect. Her behavior is normal.   Vitals reviewed.    NEUROLOGICAL EXAMINATION:     MENTAL STATUS   Oriented to person, place, and time.

## 2019-02-11 NOTE — ASSESSMENT & PLAN NOTE
-rheumatology and Dermatology consulted  -proximal muscle weakness of shoulder with mild tenderness.  -MRI L humerus revealed diffuse edema without abscess or osteomyelitis  -Dermatitis noted on exam with skin bx consistent with dermatomyositis on 1/24  -Per Rheum:   -S/p IVIG x 5 days with improvement in strength. Will need IVIg again on 2/28/19.   - Medrol 48 mg oral daily   -MTX to 20 mg sc weekly (Tuesdays) with daily folic acid.

## 2019-02-11 NOTE — PROGRESS NOTES
[facility]  Rheumatology  Progress Note    Patient Name: Ermelinda Verde  MRN: [id number]  Admission Date: 1/22/2019  Hospital Length of Stay: 20 days  Code Status: Full Code   Attending Provider: Ashwin Stewart MD  Primary Care Physician: Reta Weeks MD  Principal Problem: Myositis    Subjective:     HPI: 59yo F with history of L sided infiltrating ductal carcinoma of the L breast (dx on Jan 2017), HTN, depression, and gastritis was send from hem/onc clinic for evaluation of possible inflammatory myositis.     L breast cancer s/p completion of 4 cycles of demi-adjuvant taxotere and cytoxan (completed on 5/9/17) and mastectomy (6/26/17) and then 4 cycles of adjuvant Adriamycin (completed 9/12/17). In 10/2017 - patient developed L supraclavicular lymphadenopathy which FNA confirmed as reoccurrence of previously treated breast cancer.     Patient started on Atelizumab/Abraxane on 11/7/18. Per chart review, patient noticed rashes on the dorsum of her hands on 11/11/18. Seen in urgent care and given topical steroid cream. Then received two more infusions on Atelizumab/Abraxane on 11/21/18 and 12/5/18. Started to notice puffiness around the eyes on 12/11/18. Received another Abraxane infusion on 12/12/18 but this time with hydrocortisone 50 mg which helped reduce the swelling around the eyes. Developed swelling of the face again on 12/15/18. Next infusion of Abraxane done on 12/19/18 with Solucortef and Atelizumab held. Abraxane given again on 1/3/19 with no IV steroid. Patient developed swelling of the face on 1/4/19 and went to urgent care and given short course of prednisone 20 mg BID. On 1/15/19, patient seen in hem/onc clinic with c/o of pressure and tightness around neck. CT scan of chest and neck did not reveal any vascular compression. Started on prednisone 60 mg with taper. On 1/22/18 patient with c/o proximal muscle weakness. CPK found to be elevated around 4k. Patient admitted and given  solumedrol 80 mg IV on 1/22/18. Last infusion of Atelizumab on 12/19/18. Last infusion of Abraxane on 1/3/19. Given Solumedrol 1g x 1 on 1/23/18. Seen by Dermatology on 1/24/18 and biopsy done of skin rash. Given distribution of rashes, there was concern for dermatomyositis. Patient started on Solumedrol 125 mg IV BID at this time.     Denies any family history of autoimmune diseases.    No smoking, EtOH, recreational drug usage.    Denies any photosensitivity, joint swelling, unintentional weigh loss, abdominal pain, night sweats, CP, SOB.  +oral thrush.     Interval History: pt stated no issues overnight, no new complaints. Awaiting Rehab placement.     Current Facility-Administered Medications   Medication Frequency    acetaminophen tablet 650 mg Q4H PRN    ALPRAZolam tablet 0.25 mg TID PRN    amLODIPine tablet 10 mg Daily    dextrose 50% injection 12.5 g PRN    dextrose 50% injection 25 g PRN    enoxaparin injection 40 mg Daily    escitalopram oxalate tablet 10 mg Daily    famotidine tablet 20 mg BID    folic acid tablet 1 mg Daily    glucagon (human recombinant) injection 1 mg PRN    glucose chewable tablet 16 g PRN    glucose chewable tablet 24 g PRN    guaifenesin 100 mg/5 ml syrup 200 mg Q4H    hepatitis B (HEPLISAV-B) 20 mcg/0.5 mL vaccine 0.5 mL vaccine x 1 dose    hydrALAZINE injection 10 mg Q6H PRN    insulin aspart U-100 pen 0-5 Units QID (AC + HS) PRN    methylPREDNISolone tablet 48 mg Daily    morphine injection 2 mg Q6H PRN    ondansetron disintegrating tablet 8 mg Q8H PRN    pneumoc 13-brandin conj-dip cr(PF) (PREVNAR 13 (PF)) 0.5 mL vaccine x 1 dose    polyethylene glycol packet 17 g BID PRN    promethazine tablet 12.5 mg Q6H PRN    sodium chloride 0.65 % nasal spray 1 spray PRN    sodium chloride 0.9% flush 10 mL PRN    sodium chloride 0.9% flush 5 mL PRN     Objective:     Vital Signs (Most Recent):  Temp: 98.7 °F (37.1 °C) (02/10/19 0419)  Pulse: 97 (02/10/19 0419)  Resp: 20  (02/10/19 0419)  BP: (!) 102/56 (02/10/19 0419)  SpO2: 96 % (02/10/19 0419)  O2 Device (Oxygen Therapy): room air (02/10/19 0419) Vital Signs (24h Range):  Temp:  [98 °F (36.7 °C)-98.7 °F (37.1 °C)] 98.7 °F (37.1 °C)  Pulse:  [84-97] 97  Resp:  [18-20] 20  SpO2:  [96 %-99 %] 96 %  BP: (102-118)/(55-64) 102/56     Weight: 73.6 kg (162 lb 5.9 oz) (02/10/19 0400)  Body mass index is 27.02 kg/m².  Body surface area is 1.84 meters squared.      Intake/Output Summary (Last 24 hours) at 2/10/2019 0848  Last data filed at 2/10/2019 0540  Gross per 24 hour   Intake 2126 ml   Output 1400 ml   Net 726 ml       Physical Exam   Constitutional: She is oriented to person, place, and time and well-developed, well-nourished, and in no distress. No distress.   HENT:   Head: Normocephalic and atraumatic.   Right Ear: External ear normal.   Left Ear: External ear normal.   Eyes: Conjunctivae and EOM are normal. Pupils are equal, round, and reactive to light. Right eye exhibits no discharge. Left eye exhibits no discharge. No scleral icterus.   Neck: Normal range of motion. Neck supple.   Cardiovascular: Normal rate, regular rhythm and normal heart sounds.    Pulmonary/Chest: Effort normal and breath sounds normal. No respiratory distress.   Abdominal: Soft. Bowel sounds are normal.       Right Side Rheumatological Exam     Muscle Strength (0-5 scale):  Neck Flexion:  4  Neck Extension: 5  Deltoid:  4  Biceps: 5/5   Triceps:  4.4  : 5/5   Iliopsoas: 4.6  Quadriceps:  5   Distal Lower Extremity: 5    Left Side Rheumatological Exam     Muscle Strength (0-5 scale):  Neck Flexion:  4  Neck Extension: 5  Deltoid:  4  Biceps: 5/5   Triceps:  4.4  :  5/5   Iliopsoas: 4.6  Quadriceps:  5   Distal Lower Extremity: 5      Neurological: She is alert and oriented to person, place, and time.   Skin: Skin is warm and dry. No rash noted. She is not diaphoretic.     Hyperpigmented non raised rash over nape of neck, elbows, knuckles (resemble  Gottron's papules), thighs, and ankles- resolved       Psychiatric: Mood and affect normal.   Musculoskeletal: Normal range of motion. She exhibits no edema, tenderness or deformity.         Significant Labs:  Recent Results (from the past 48 hour(s))   Comprehensive Metabolic Panel (CMP)    Collection Time: 02/09/19  4:40 AM   Result Value Ref Range    Sodium 135 (L) 136 - 145 mmol/L    Potassium 3.8 3.5 - 5.1 mmol/L    Chloride 104 95 - 110 mmol/L    CO2 27 23 - 29 mmol/L    Glucose 120 (H) 70 - 110 mg/dL    BUN, Bld 19 6 - 20 mg/dL    Creatinine 0.5 0.5 - 1.4 mg/dL    Calcium 8.8 8.7 - 10.5 mg/dL    Total Protein 6.4 6.0 - 8.4 g/dL    Albumin 2.7 (L) 3.5 - 5.2 g/dL    Total Bilirubin 0.5 0.1 - 1.0 mg/dL    Alkaline Phosphatase 59 55 - 135 U/L    AST 79 (H) 10 - 40 U/L    ALT 46 (H) 10 - 44 U/L    Anion Gap 4 (L) 8 - 16 mmol/L    eGFR if African American >60.0 >60 mL/min/1.73 m^2    eGFR if non African American >60.0 >60 mL/min/1.73 m^2   Magnesium    Collection Time: 02/09/19  4:40 AM   Result Value Ref Range    Magnesium 2.0 1.6 - 2.6 mg/dL   Phosphorus    Collection Time: 02/09/19  4:40 AM   Result Value Ref Range    Phosphorus 2.7 2.7 - 4.5 mg/dL   CK    Collection Time: 02/09/19  4:40 AM   Result Value Ref Range     (H) 20 - 180 U/L   CBC auto differential    Collection Time: 02/09/19  4:40 AM   Result Value Ref Range    WBC 4.38 3.90 - 12.70 K/uL    RBC 2.67 (L) 4.00 - 5.40 M/uL    Hemoglobin 7.5 (L) 12.0 - 16.0 g/dL    Hematocrit 23.9 (L) 37.0 - 48.5 %    MCV 90 82 - 98 fL    MCH 28.1 27.0 - 31.0 pg    MCHC 31.4 (L) 32.0 - 36.0 g/dL    RDW 19.9 (H) 11.5 - 14.5 %    Platelets 167 150 - 350 K/uL    MPV 10.6 9.2 - 12.9 fL    Immature Granulocytes 0.5 0.0 - 0.5 %    Gran # (ANC) 3.7 1.8 - 7.7 K/uL    Immature Grans (Abs) 0.02 0.00 - 0.04 K/uL    Lymph # 0.4 (L) 1.0 - 4.8 K/uL    Mono # 0.3 0.3 - 1.0 K/uL    Eos # 0.0 0.0 - 0.5 K/uL    Baso # 0.00 0.00 - 0.20 K/uL    nRBC 0 0 /100 WBC    Gran% 84.4 (H)  38.0 - 73.0 %    Lymph% 8.7 (L) 18.0 - 48.0 %    Mono% 6.4 4.0 - 15.0 %    Eosinophil% 0.0 0.0 - 8.0 %    Basophil% 0.0 0.0 - 1.9 %    Differential Method Automated    Comprehensive Metabolic Panel (CMP)    Collection Time: 02/10/19  4:00 AM   Result Value Ref Range    Sodium 136 136 - 145 mmol/L    Potassium 3.7 3.5 - 5.1 mmol/L    Chloride 104 95 - 110 mmol/L    CO2 28 23 - 29 mmol/L    Glucose 106 70 - 110 mg/dL    BUN, Bld 24 (H) 6 - 20 mg/dL    Creatinine 0.5 0.5 - 1.4 mg/dL    Calcium 8.1 (L) 8.7 - 10.5 mg/dL    Total Protein 5.6 (L) 6.0 - 8.4 g/dL    Albumin 2.4 (L) 3.5 - 5.2 g/dL    Total Bilirubin 0.3 0.1 - 1.0 mg/dL    Alkaline Phosphatase 65 55 - 135 U/L    AST 63 (H) 10 - 40 U/L    ALT 37 10 - 44 U/L    Anion Gap 4 (L) 8 - 16 mmol/L    eGFR if African American >60.0 >60 mL/min/1.73 m^2    eGFR if non African American >60.0 >60 mL/min/1.73 m^2   Magnesium    Collection Time: 02/10/19  4:00 AM   Result Value Ref Range    Magnesium 1.8 1.6 - 2.6 mg/dL   Phosphorus    Collection Time: 02/10/19  4:00 AM   Result Value Ref Range    Phosphorus 1.9 (L) 2.7 - 4.5 mg/dL   CK    Collection Time: 02/10/19  4:00 AM   Result Value Ref Range     20 - 180 U/L   CBC auto differential    Collection Time: 02/10/19  4:00 AM   Result Value Ref Range    WBC 4.79 3.90 - 12.70 K/uL    RBC 2.55 (L) 4.00 - 5.40 M/uL    Hemoglobin 7.0 (L) 12.0 - 16.0 g/dL    Hematocrit 22.7 (L) 37.0 - 48.5 %    MCV 89 82 - 98 fL    MCH 27.5 27.0 - 31.0 pg    MCHC 30.8 (L) 32.0 - 36.0 g/dL    RDW 20.0 (H) 11.5 - 14.5 %    Platelets 152 150 - 350 K/uL    MPV 10.1 9.2 - 12.9 fL    Immature Granulocytes 0.4 0.0 - 0.5 %    Gran # (ANC) 3.4 1.8 - 7.7 K/uL    Immature Grans (Abs) 0.02 0.00 - 0.04 K/uL    Lymph # 1.0 1.0 - 4.8 K/uL    Mono # 0.3 0.3 - 1.0 K/uL    Eos # 0.0 0.0 - 0.5 K/uL    Baso # 0.00 0.00 - 0.20 K/uL    nRBC 0 0 /100 WBC    Gran% 71.6 38.0 - 73.0 %    Lymph% 21.3 18.0 - 48.0 %    Mono% 6.3 4.0 - 15.0 %    Eosinophil% 0.4 0.0 -  8.0 %    Basophil% 0.0 0.0 - 1.9 %    Differential Method Automated       Ref. Range 1/31/2019 04:00 2/1/2019 04:12 2/2/2019 04:44 2/3/2019 03:29 2/4/2019 04:06 2/5/2019 04:55 2/6/2019 04:09 2/7/2019 03:50   CPK Latest Ref Range: 20 - 180 U/L 1147 (H) 827 (H) 662 (H) 517 (H) 465 (H) 434 (H) 448 (H) 357 (H)          Ref. Range 1/24/2019 03:40 1/25/2019 04:20 1/26/2019 04:00 1/27/2019 04:21 1/28/2019 04:00   Aldolase Latest Ref Range: 1.2 - 7.6 U/L 12.7 (H) 14.2 (H) 13.7 (H) 13.1 (H) 15.4 (H)      Ref. Range 1/22/2019 22:24   Sed Rate Latest Ref Range: 0 - 36 mm/Hr 50 (H)        Ref. Range 1/22/2019 22:24   CRP Latest Ref Range: 0.0 - 8.2 mg/L 31.1 (H)      Quant TB 1/30/19: indeterminate       Ref. Range 1/22/2019 22:24 1/25/2019 17:33   DARCY HEP-2 Titer Unknown Positive 1:640 Sp...    Anti-SSA Antibody Latest Ref Range: 0.00 - 19.99 EU 0.49    Anti-SSA Interpretation Latest Ref Range: Negative  Negative    Anti-SSB Antibody Latest Ref Range: 0.00 - 19.99 EU 0.11    Anti-SSB Interpretation Latest Ref Range: Negative  Negative    ds DNA Ab Latest Ref Range: Negative 1:10  Negative 1:10    Anti Sm Antibody Latest Ref Range: 0.00 - 19.99 EU 0.58    Anti-Sm Interpretation Latest Ref Range: Negative  Negative    Anti Sm/RNP Antibody Latest Ref Range: 0.00 - 19.99 EU 0.62    Anti-Sm/RNP Interpretation Latest Ref Range: Negative  Negative    DARCY Screen Latest Ref Range: Negative <1:160  Positive (A)    Complement (C-3) Latest Ref Range: 50 - 180 mg/dL  106   Complement (C-4) Latest Ref Range: 11 - 44 mg/dL  29   Meg-1 Autoantibodies Latest Ref Range: <1.0 Index <1.00       Ref. Range 1/23/2019 02:55 1/23/2019 02:56   Specimen UA Unknown Urine, Clean Catch    Color, UA Latest Ref Range: Yellow, Straw, [place]  Colorless (A)    pH, UA Latest Ref Range: 5.0 - 8.0  6.0    Specific Gravity, UA Latest Ref Range: 1.005 - 1.030  1.005    Appearance, UA Latest Ref Range: Clear  Clear    Protein, UA Latest Ref Range: Negative  Negative     Glucose, UA Latest Ref Range: Negative  Negative    Ketones, UA Latest Ref Range: Negative  Negative    Occult Blood UA Latest Ref Range: Negative  Negative    Nitrite, UA Latest Ref Range: Negative  Negative    Bilirubin (UA) Latest Ref Range: Negative  Negative    Leukocytes, UA Latest Ref Range: Negative  Trace (A)    RBC, UA Latest Ref Range: 0 - 4 /hpf  2   WBC, UA Latest Ref Range: 0 - 5 /hpf  3   Squam Epithel, UA Latest Units: /hpf  0   Microscopic Comment Unknown  SEE COMMENT     Meg-1: Negative    Significant Imaging:  MRI Humerus w/ and w/o contrast:  Impression       1. Diffuse edema and enhancement of the visualized left upper extremity musculature, most pronounced proximally, most notably of the deltoid and biceps musculature as detailed above.  Findings are nonspecific although could relate to a nonspecific myelitis particularly in light of patient's reported history.  Clinical correlation with appropriate history and lab markers advised.  2. No evidence of abscess or osteomyelitis.  This report was flagged in Epic as abnormal.     NM PET CT 12/27/18:  Impression       See above    Significant improvement in all the previously seen lymph nodes involving the left lower neck, supraclavicular region, axillary and retropectoral region.    Index left retropectoral SUV max 3.57, previously 27.2.     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 Biopsied.                        - Small hiatal hernia.                        - Normal stomach.                        - Normal examined duodenum.  Recommendation:       - Return patient to hospital to for ongoing care.                        - Await pathology results.                        - Use a proton pump inhibitor PO BID.                        - The findings and recommendations were discussed                         with the designated responsible  adult.                        - Repeat upper endoscopy in 8 weeks to check                         healing.           Assessment/Plan:     * Myositis    59yo F with history of L sided infiltrating ductal carcinoma of the L breast (dx on Jan 2017), HTN, depression, and gastritis was send from hem/onc clinic for evaluation of possible inflammatory myositis.      Patient started on Atelizumab/Abraxane on 11/7/18. Per chart review, patient noticed rashes on the dorsum of her hands on 11/11/18. Seen in urgent care and given topical steroid cream. Then received two more infusions on Atelizumab/Abraxane on 11/21/18 and 12/5/18. Started to notice puffiness around the eyes on 12/11/18. Received another Abraxane infusion on 12/12/18 but this time with hydrocortisone 50 mg which helped reduce the swelling around the eyes. Developed swelling of the face again on 12/15/18. Next infusion of Abraxane done on 12/19/18 with Solucortef and Atelizumab held. Abraxane given again on 1/3/19 with no IV steroid. Patient developed swelling of the face on 1/4/19 and went to urgent care and given short course of prednisone 20 mg BID. On 1/15/19, patient seen in hem/onc clinic with c/o of pressure and tightness around neck. CT scan of chest and neck did not reveal any vascular compression. Started on prednisone 60 mg with taper. On 1/22/18 patient with c/o proximal muscle weakness. CPK found to be elevated around 4k. Patient admitted and given solumedrol 80 mg IV on 1/22/18. Last infusion of Atelizumab on 12/19/18. Last infusion of Abraxane on 1/3/19. Given Solumedrol 1g x 1 on 1/23/18. Seen by Dermatology on 1/24/18 and biopsy done of skin rash. Given distribution of rashes, there was concern for dermatomyositis. Patient started on Solumedrol 125 mg IV BID at that time.  Skin biopsy resulted consistent with dermatomyositis.     Labs: ESR 50, CRP 31.1, CPK 4562 (trending down-->166) Aldolase 13.6-->3.2.  , ALT 64.  UA normal.  CBC  "unremarkable.  +DARCY 1:640 speckled with negative profile.  Complements normal. Normal GGT. RPR, HIV negative. Hep B/C negative.  Strongyloides negative. HMGCR antibody negative.  Ferritin elevated at 641, iron on low side at 37, tibc low at 247, transferrin low 167, haptoglobin 153, retic slightly increased at 2.6%, ldh increased at 325. quantTB: indeterminate, T-spot: negative     Spirometry: normal, normal diffusion capacity. FVC: 81%, DLCO: 114%     Case reports of docetaxel related inflammatory myositis had been documented. "...taxanes have been noted to cause disabling but transient arthralgia and myalgias; it is important to consider the possibility of inflammatory myopathy as a possible complication in patients undergoing treatment with these agents." - A case of docetaxel induced myositis and review of literature. Austin et al 2015.     Case reports in Rheumatology   Case reports of atezolizumb (immunomodulator) cause of myositis  - Lucy et al 2017 "Myositis as an adverse even of immune checkpoint blockade for cancer therapy.  Seminars in Arthritis and Rheumatism      Patient exhibits signs of dermatomyositis (heliotropic rash and gottron's papules).   Dermatomyositis can be associated with malignancy.  MRI of LUE showed edema of the deltoid and biceps.  Exam with proximal muscle weakness: b/l deltoids 4/5, b/l iliopsoas 4.4/5. 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     Failed 2nd swallow eval on 2/6/19. PEG placed 2/7/2019.     Inpatient treatments so far:  - Solumedrol 80 mg IV x 1 on 1/22/19  - Solumedrol 1 g IV x 1 on 1/23/19.  - Solumedrol 125 mg IV BID since 1/24/19 -->being tapered down. Now on medrol 48mg since 02/02/19  - IVIG 1/29/19-2/2     Plan:  - chemotherapy " can be re-started as it may help treat Dermatomyositis. Would recommend against checkpoint inhibitor, however, as it may have been a trigger for the dermatomyositis  - continue to monitor CPK, aldolase, AST/ALT  - continue medrol 48mg PO daily on discharge to Rehab  - completed IVIg 400 mg/kg daily x 5 days. Will need IVIG 1 g/kg on 2/28/19.   - should get colonoscopy as outpatient as part of malignancy work-up   - MTX to 20 mg sc weekly (Tuesdays) with daily folic acid.  will need prescription upon discharge and will also need safety labs repeated 4 weeks from now for MTX monitoring.  - follow up myomarker panel.  - recommend Rehab after discharge to help with strengthening.  - recommend ID consult for vaccinations.  - f/u esophageal biopsy results  - PFTs with lung volumes as outpatient.   - 1200 mg dietary calcium and Vitamin D3 1000 mg daily  - continue H2 blocker  - f/u with Dr. Swift and Dr. Campos in Rheumatology upon discharge.           Rock Chavez MD  Rheumatology  [facility]      Patient seen and examined with fellow.  All elements of history, physical exam and medical decision making independently confirmed by me.  Patient with dermatomyositis thought to be secondary to use of checkpoint inhibitor or related to underlying history of breast Ca.  S/p IVIG and high dose steroid.  Now on MTX  , folic acid and medrol 48 mg.  Patient is being discharged to rehab.  Would continue current medications and f/u with Dr. Campos and Dr. Swift in rheumatology.  Will sign off.   See note for details.

## 2019-02-11 NOTE — PLAN OF CARE
Problem: Adult Inpatient Plan of Care  Goal: Plan of Care Review  Outcome: Ongoing (interventions implemented as appropriate)  Pt AAO x 4.  No complaints this shift.  Denies N, V, D, pain.  Stable vital signs.  Tolerated all medications via peg tube.  Tube feeding Isosource 1.5 @ 65 cc/hr.  Safety precautions in place.  No falls or injuries this shift.  Side rails up x 2.  Bed in lowest position.  Wheels locked.  Call light, bedside table, personal items in reach.  Will continue to monitor.

## 2019-02-11 NOTE — PLAN OF CARE
Problem: Adult Inpatient Plan of Care  Goal: Plan of Care Review  Outcome: Ongoing (interventions implemented as appropriate)  Pt AAOx4, ambulates with minimal assist. Afebrile. VSS. UO adequate, one BM this shift. Shearing to buttocks cleansed with soap and water, barrier cream applied. No complaints of pain. Tolerating peg tube feeding @ goal 65mL/hr. Pt turns and repositions self, remains free from falls. Pt with nonskid footwear on, bed in lowest position and locked with bed rails up x2. Call light within reach. See flow sheet for further assessment. Will continue to monitor.

## 2019-02-11 NOTE — SUBJECTIVE & OBJECTIVE
Interval History: NAEON, strength slowly improving as CPK downtrends on steroids. tolerating tube feeds well. Dispo pending rehab placement early this week.    Oncology Treatment Plan:   OP BREAST DOCETAXEL Q3W    Medications:  Continuous Infusions:    Scheduled Meds:   amLODIPine  10 mg Per G Tube Daily    enoxaparin  40 mg Subcutaneous Daily    escitalopram oxalate  10 mg Per G Tube Daily    famotidine  20 mg Per G Tube BID    folic acid  1 mg Per G Tube Daily    guaifenesin 100 mg/5 ml  200 mg Per G Tube Q4H    methylPREDNISolone  48 mg Oral Daily     PRN Meds:acetaminophen, ALPRAZolam, dextrose 50%, dextrose 50%, glucagon (human recombinant), glucose, glucose, hepatitis B, hydrALAZINE, insulin aspart U-100, morphine, ondansetron, pneumoc 13-brandin conj-dip cr(PF), polyethylene glycol, promethazine, sodium chloride, sodium chloride 0.9%, sodium chloride 0.9%     Review of Systems   Constitutional: Positive for fatigue. Negative for activity change, appetite change, chills, diaphoresis, fever and unexpected weight change.   HENT: Positive for facial swelling and trouble swallowing. Negative for congestion, ear discharge, hearing loss, postnasal drip, sinus pressure, sneezing, sore throat and tinnitus.    Eyes: Negative for photophobia, pain and redness.   Respiratory: Negative for apnea, cough, choking, chest tightness, shortness of breath and stridor.    Cardiovascular: Negative for chest pain, palpitations and leg swelling.   Gastrointestinal: Negative for abdominal pain, anal bleeding, constipation, diarrhea, nausea and rectal pain.   Endocrine: Negative for polyuria.   Genitourinary: Negative for dysuria and hematuria.   Musculoskeletal: Negative for arthralgias, back pain, gait problem, joint swelling, myalgias, neck pain and neck stiffness.   Skin: Positive for rash. Negative for color change and pallor.   Allergic/Immunologic: Negative for immunocompromised state.   Neurological: Positive for weakness.  Negative for dizziness, seizures and numbness.   Hematological: Positive for adenopathy. Does not bruise/bleed easily.   Psychiatric/Behavioral: Negative for agitation and behavioral problems.     Objective:     Vital Signs (Most Recent):  Temp: 98.3 °F (36.8 °C) (02/11/19 0809)  Pulse: 90 (02/11/19 0809)  Resp: 19 (02/11/19 0809)  BP: (!) 105/55 (02/11/19 0809)  SpO2: 99 % (02/11/19 0809) Vital Signs (24h Range):  Temp:  [97.5 °F (36.4 °C)-99.4 °F (37.4 °C)] 98.3 °F (36.8 °C)  Pulse:  [] 90  Resp:  [16-20] 19  SpO2:  [94 %-100 %] 99 %  BP: (105-128)/(55-68) 105/55     Weight: 73.7 kg (162 lb 7.7 oz)  Body mass index is 27.04 kg/m².  Body surface area is 1.84 meters squared.      Intake/Output Summary (Last 24 hours) at 2/11/2019 0842  Last data filed at 2/11/2019 0513  Gross per 24 hour   Intake 1282 ml   Output 1400 ml   Net -118 ml       Physical Exam   Constitutional: She is oriented to person, place, and time. She appears well-developed and well-nourished. No distress.   HENT:   Head: Atraumatic.   Nose: Nose normal.   Mouth/Throat: No oropharyngeal exudate.   Eyes: Conjunctivae and EOM are normal. Pupils are equal, round, and reactive to light. Right eye exhibits no discharge. Left eye exhibits no discharge. No scleral icterus.   Neck: Normal range of motion. Neck supple. No tracheal deviation present.   Cardiovascular: Normal rate, regular rhythm and intact distal pulses. Exam reveals no gallop and no friction rub.   Pulmonary/Chest: Effort normal and breath sounds normal. No respiratory distress. She has no wheezes. She has no rales. She exhibits no tenderness.   Abdominal: Soft. Bowel sounds are normal. She exhibits no distension and no mass. There is no tenderness. There is no rebound and no guarding.   PEG tube in situ   Musculoskeletal: Normal range of motion. She exhibits no tenderness or deformity.   Neurological: She is alert and oriented to person, place, and time. No cranial nerve deficit or  sensory deficit.     4.5/5 shoulder strength on shoulder abduction; improving  5/5 flexion and extension preserved at elbow   Skin: Skin is warm and dry. Capillary refill takes less than 2 seconds. No rash noted. She is not diaphoretic. No erythema. No pallor.   Psychiatric: She has a normal mood and affect.   Vitals reviewed.      Significant Labs:   BMP:   Recent Labs   Lab 02/10/19  0400         K 3.7      CO2 28   BUN 24*   CREATININE 0.5   CALCIUM 8.1*   MG 1.8   , CBC:   Recent Labs   Lab 02/10/19  0400 02/11/19  0400   WBC 4.79 4.57   HGB 7.0* 7.2*   HCT 22.7* 22.7*    161   , CMP:   Recent Labs   Lab 02/10/19  0400      K 3.7      CO2 28      BUN 24*   CREATININE 0.5   CALCIUM 8.1*   PROT 5.6*   ALBUMIN 2.4*   BILITOT 0.3   ALKPHOS 65   AST 63*   ALT 37   ANIONGAP 4*   EGFRNONAA >60.0   , Coagulation: No results for input(s): PT, INR, APTT in the last 48 hours., Haptoglobin: No results for input(s): HAPTOGLOBIN in the last 48 hours., Immunology: No results for input(s): SPEP, ERVIN, DARCY, FREELAMBDALI in the last 48 hours., LDH: No results for input(s): LDHCSF, BFSOURCE in the last 48 hours., LFTs:   Recent Labs   Lab 02/10/19  0400   ALT 37   AST 63*   ALKPHOS 65   BILITOT 0.3   PROT 5.6*   ALBUMIN 2.4*   , Reticulocytes:   Recent Labs   Lab 02/10/19  0400   RETIC 3.0*   , Tumor Markers: No results for input(s): PSA, CEA, , AFPTM, QB1287,  in the last 48 hours.    Invalid input(s): ALGTM, Uric Acid No results for input(s): URICACID in the last 48 hours., Urine Studies: No results for input(s): COLORU, APPEARANCEUA, PHUR, SPECGRAV, PROTEINUA, GLUCUA, KETONESU, BILIRUBINUA, OCCULTUA, NITRITE, UROBILINOGEN, LEUKOCYTESUR, RBCUA, WBCUA, BACTERIA, SQUAMEPITHEL, HYALINECASTS in the last 48 hours.    Invalid input(s): WRIGHTSUR and All pertinent labs from the last 24 hours have been reviewed.    Diagnostic Results:  EXAMINATION:  FL MODIFIED BARIUM SWALLOW SPEECH  STUDY    CLINICAL HISTORY:  repeat to determine diet initiation.;    TECHNIQUE:  Modified Barium Swallow Study. Video fluoroscopic swallowing examination was performed in conjunction with the speech pathology department.  Various liquid and solid food substances were used to assess swallowing.    Fluoroscopy Time: 1.9 minutes    COMPARISON:  Fluoroscopic modified barium swallow 01/28/2019      Impression       Transit: Delayed oral transit time. Significant vallecular and pyriform recess residual/layering.    Penetration/Aspiration: Aspiration following the consumption of liquids.  Significant stasis of puree food with high risk of aspiration.    Other: N/A.    See speech pathology report for further details.    Electronically signed by resident: Latrell Garcia MD  Date: 02/06/2019  Time: 15:43    Electronically signed by: Shay Reddy MD  Date: 02/06/2019

## 2019-02-11 NOTE — PROGRESS NOTES
[facility]  Physical Medicine & Rehab  Progress Note    Patient Name: Ermelinda Verde  MRN: [id number]  Admission Date: 1/22/2019  Length of Stay: 20 days  Attending Physician: Ashwin Stewart MD    Subjective:     Principal Problem:Myositis    Hospital Course:   01/31/2019: Participated with OT.  Sit to stand and transfers SV.  Ambulated 25 ft SV with 1 minor LOB with self-corrected.  Grooming SV.  02/07/2019: Participated with therapy.  Sit to stand (I) and transfers SBA & RW.  Ambulated 28 FT sba & rw.  02/9/2019: Participated with therapy.  Bed mobility SBA.  Sit to stand SV. UBD Sandra and grooming/toileting SBA.    Interval History 2/11/2019:  Patient is seen for follow-up rehab evaluation and recommendations: Participating with therapy.     HPI, Past Medical, Family, and Social History remains the same as documented in the initial encounter.    Scheduled Medications:    amLODIPine  10 mg Per G Tube Daily    enoxaparin  40 mg Subcutaneous Daily    escitalopram oxalate  10 mg Per G Tube Daily    famotidine  20 mg Per G Tube BID    folic acid  1 mg Per G Tube Daily    guaifenesin 100 mg/5 ml  200 mg Per G Tube Q4H    methylPREDNISolone  48 mg Oral Daily       Diagnostic Results: Labs: Reviewed    PRN Medications: acetaminophen, ALPRAZolam, dextrose 50%, dextrose 50%, glucagon (human recombinant), glucose, glucose, hepatitis B, hydrALAZINE, insulin aspart U-100, morphine, ondansetron, pneumoc 13-brandin conj-dip cr(PF), polyethylene glycol, promethazine, sodium chloride, sodium chloride 0.9%, sodium chloride 0.9%    Review of Systems   Constitutional: Positive for activity change. Negative for fatigue and fever.   HENT: Positive for trouble swallowing. Negative for voice change.    Musculoskeletal: Positive for gait problem and myalgias.   Skin: Positive for rash (improving). Negative for color change.   Neurological: Positive for weakness. Negative for numbness.   Psychiatric/Behavioral:  Negative for agitation and confusion.     Objective:     Vital Signs (Most Recent):  Temp: 98.3 °F (36.8 °C) (02/11/19 0809)  Pulse: 90 (02/11/19 0809)  Resp: 19 (02/11/19 0809)  BP: (!) 105/55 (02/11/19 0809)  SpO2: 99 % (02/11/19 0809)    Vital Signs (24h Range):  Temp:  [97.5 °F (36.4 °C)-99.4 °F (37.4 °C)] 98.3 °F (36.8 °C)  Pulse:  [] 90  Resp:  [16-20] 19  SpO2:  [94 %-100 %] 99 %  BP: (105-128)/(55-68) 105/55     Physical Exam   Constitutional: She is oriented to person, place, and time. She appears well-developed and well-nourished.   HENT:   Head: Normocephalic and atraumatic.   Eyes: Right eye exhibits no discharge. Left eye exhibits no discharge.   Neck: Neck supple.   Cardiovascular: Intact distal pulses.   Pulmonary/Chest: Effort normal. No respiratory distress.   Abdominal: Soft. There is no tenderness.   Musculoskeletal: She exhibits no deformity.   RUE: 3+/5.  LUE: 3+/5.  RLE: 3/5.  LLE: 4/5-all proximal strength   Neurological: She is alert and oriented to person, place, and time. No sensory deficit.   Skin: Skin is warm and dry.   Psychiatric: She has a normal mood and affect. Her behavior is normal.   Vitals reviewed.    Assessment/Plan:      * Myositis    -rheumatology and Dermatology consulted  -proximal muscle weakness of shoulder with mild tenderness.  -MRI L humerus revealed diffuse edema without abscess or osteomyelitis  -Dermatitis noted on exam with skin bx consistent with dermatomyositis on 1/24  -Per Rheum:   -S/p IVIG x 5 days with improvement in strength. Will need IVIg again on 2/28/19.   - Medrol 48 mg oral daily   -MTX to 20 mg sc weekly (Tuesdays) with daily folic acid.     Impaired functional mobility and endurance    -2/2 myositis     Recommendations  -  Encourage mobility, OOB in chair, and early ambulation as appropriate  -  PT/OT evaluate and treat  -  Pain management  -  DVT prophylaxis  -  Monitor for and prevent skin breakdown and pressure ulcers  · Early mobility,  "repositioning/weight shifting every 20-30 minutes when sitting, turn patient every 2 hours, proper mattress/overlay and chair cushioning, pressure relief/heel protector boots         Dermatomyositis    -see myositis      Dysphagia    -s/p PEG placement 2/7     Swelling of upper arm    -BUE US negative for DVT     Dermatitis    -GI consulted  -EGD with bx for erosive esophagitis that are pending  - S/p fluconazole and nystatin     Adjustment disorder with depressed mood    -on SSRI     Breast cancer of upper-outer quadrant of left female breast    -s/p L breast mastectomy and s/p chemo 2017  -Per rheumatology, "hold paclitaxel agent and atezolizumb at this time - both can cause myositis."     Recommend Inpatient Rehab.  IRF preference per patient/[employer].  Barriers to IRF admission:  Insurance auth pending.       Larissa Lezama NP  Department of Physical Medicine & Rehab   [facility]  "

## 2019-02-11 NOTE — PLAN OF CARE
Problem: Physical Therapy Goal  Goal: Physical Therapy Goal  Goals to be met by: 19     Patient will increase functional independence with mobility by performin. Gait  x 500 feet with Supervision without device. - GOAL MET  2. Standing lower extremity exercise program x 15 reps per handout, with supervision. - GOAL MET    3. Increased functional strength to 5/5 for BLE.  4. Pt to perf 6MWT: amb 580', to demonstrate a clinically significant improvement in aerobic capacity.    5. Pt to perf 10x sit<>stand: 2 min 45 sec, to demonstrate improvements in B LE mm strength.         Outcome: Ongoing (interventions implemented as appropriate)  Pt progressing towards goals.

## 2019-02-11 NOTE — PROGRESS NOTES
Message left for [facility] rehab liaison/Tomas for status of pt transferring to rehab today.  SWer remains available.

## 2019-02-12 ENCOUNTER — TELEPHONE (OUTPATIENT)
Dept: RHEUMATOLOGY | Facility: CLINIC | Age: 59
End: 2019-02-12

## 2019-02-12 ENCOUNTER — PATIENT MESSAGE (OUTPATIENT)
Dept: GASTROENTEROLOGY | Facility: CLINIC | Age: 59
End: 2019-02-12

## 2019-02-12 PROCEDURE — 97535 SELF CARE MNGMENT TRAINING: CPT

## 2019-02-12 PROCEDURE — 63600175 PHARM REV CODE 636 W HCPCS: Performed by: INTERNAL MEDICINE

## 2019-02-12 PROCEDURE — 20600001 HC STEP DOWN PRIVATE ROOM

## 2019-02-12 PROCEDURE — 99232 SBSQ HOSP IP/OBS MODERATE 35: CPT | Mod: ,,, | Performed by: NURSE PRACTITIONER

## 2019-02-12 PROCEDURE — 97530 THERAPEUTIC ACTIVITIES: CPT

## 2019-02-12 PROCEDURE — 99233 PR SUBSEQUENT HOSPITAL CARE,LEVL III: ICD-10-PCS | Mod: ,,, | Performed by: INTERNAL MEDICINE

## 2019-02-12 PROCEDURE — 25000003 PHARM REV CODE 250: Performed by: STUDENT IN AN ORGANIZED HEALTH CARE EDUCATION/TRAINING PROGRAM

## 2019-02-12 PROCEDURE — 99232 PR SUBSEQUENT HOSPITAL CARE,LEVL II: ICD-10-PCS | Mod: ,,, | Performed by: NURSE PRACTITIONER

## 2019-02-12 PROCEDURE — 97110 THERAPEUTIC EXERCISES: CPT

## 2019-02-12 PROCEDURE — 63600175 PHARM REV CODE 636 W HCPCS: Performed by: STUDENT IN AN ORGANIZED HEALTH CARE EDUCATION/TRAINING PROGRAM

## 2019-02-12 PROCEDURE — 99233 SBSQ HOSP IP/OBS HIGH 50: CPT | Mod: ,,, | Performed by: INTERNAL MEDICINE

## 2019-02-12 PROCEDURE — 92526 ORAL FUNCTION THERAPY: CPT

## 2019-02-12 RX ORDER — METHYLPREDNISOLONE 16 MG/1
48 TABLET ORAL DAILY
Qty: 90 TABLET | Refills: 0 | Status: CANCELLED
Start: 2019-02-12 | End: 2019-03-14

## 2019-02-12 RX ORDER — METHOTREXATE 25 MG/ML
20 INJECTION, SOLUTION INTRA-ARTERIAL; INTRAMUSCULAR; INTRAVENOUS ONCE
Status: CANCELLED
Start: 2019-02-12 | End: 2019-02-12

## 2019-02-12 RX ORDER — METHYLPREDNISOLONE 4 MG/1
48 TABLET ORAL DAILY
Status: DISCONTINUED | OUTPATIENT
Start: 2019-02-12 | End: 2019-02-13 | Stop reason: HOSPADM

## 2019-02-12 RX ORDER — METHOTREXATE 25 MG/ML
20 INJECTION, SOLUTION INTRA-ARTERIAL; INTRAMUSCULAR; INTRAVENOUS ONCE
Status: COMPLETED | OUTPATIENT
Start: 2019-02-12 | End: 2019-02-12

## 2019-02-12 RX ADMIN — ESCITALOPRAM OXALATE 10 MG: 5 TABLET, FILM COATED ORAL at 10:02

## 2019-02-12 RX ADMIN — GUAIFENESIN 200 MG: 100 SOLUTION ORAL at 10:02

## 2019-02-12 RX ADMIN — GUAIFENESIN 200 MG: 100 SOLUTION ORAL at 03:02

## 2019-02-12 RX ADMIN — METHYLPREDNISOLONE 48 MG: 4 TABLET ORAL at 10:02

## 2019-02-12 RX ADMIN — ENOXAPARIN SODIUM 40 MG: 100 INJECTION SUBCUTANEOUS at 06:02

## 2019-02-12 RX ADMIN — FAMOTIDINE 20 MG: 20 TABLET ORAL at 09:02

## 2019-02-12 RX ADMIN — FOLIC ACID 1 MG: 1 TABLET ORAL at 10:02

## 2019-02-12 RX ADMIN — FAMOTIDINE 20 MG: 20 TABLET ORAL at 10:02

## 2019-02-12 RX ADMIN — GUAIFENESIN 200 MG: 100 SOLUTION ORAL at 01:02

## 2019-02-12 RX ADMIN — GUAIFENESIN 200 MG: 100 SOLUTION ORAL at 06:02

## 2019-02-12 RX ADMIN — METHOTREXATE SODIUM 20 MG: 25 INJECTION, SOLUTION INTRA-ARTERIAL; INTRAMUSCULAR; INTRAVENOUS at 04:02

## 2019-02-12 RX ADMIN — AMLODIPINE BESYLATE 10 MG: 10 TABLET ORAL at 10:02

## 2019-02-12 NOTE — PLAN OF CARE
CM notified by  that patient subQ MTX appt needs to be moved from 2/15/19 to 2/19/19 per rehab and [employer] request.     CM messaged Dr. Swift's clinic (Rheumatology) with the above request. Awaiting response.    Addendum on 2/12/19 at 1500: DOLLY notified that [facility] has authorized inpatient rehab. DOLLY notified by  that [facility] and [facility] are requesting all subQ MTX and IVIG appts be made in advance (and scheduled between the times of 1pm-3pm). Appts must be made before patient will be accepted to transfer to rehab facility. CM called Dr. Swift's clinic to schedule appts. CM notified by  that these appts would need to be approved by Dr. Swift's nurse before scheduling can take place. Message sent to nurse by .     DOLLY sent a secure chat with above information to Dr. Obregon and the team. This will likely delay the patient's discharge.    Nicole Carter, RN, BSN, CM [facility]  Nurse - Med Onc/Gyn Onc  [phone]

## 2019-02-12 NOTE — PLAN OF CARE
Problem: Occupational Therapy Goal  Goal: Occupational Therapy Goal  Updated Goals to be met by 7 days-- 2/16/19    Pt will complete UB dressing with Supervision.  Pt will complete functional mobility household distance with Supervision using AD as needed.- Progressing   Pt will be independent with UE HEP.- Progressing   Pt will increase UE strength to WFL needed for ADLs/IADLs.- Progressing     Goals to be met by 2/5/2019 Extended to 2/16/19    Pt will be indep w/ BUE HEP. - Initiated   Pt will indep feed.     Outcome: Ongoing (interventions implemented as appropriate)  Pt presented to OT with impaired endurance and weakness noted with minimal movement. Pt required a rest break after each set of UE exercises due to fatigue; pt recovered with pursed lip breathing. Pt cooperative, and tolerated OT session well with no c/o increased pain with activity. Pt motivated to reach all goals set in therapy. Pt will continue to benefit from skilled OT to build strength and endurance to increase independence in all ADLs/ IADLs.     Comments: Madonna Mcclure OTR/L

## 2019-02-12 NOTE — SUBJECTIVE & OBJECTIVE
Interval History 2/12/2019:  Patient is seen for follow-up rehab evaluation and recommendations: Progressing with therapy.     HPI, Past Medical, Family, and Social History remains the same as documented in the initial encounter.    Scheduled Medications:    amLODIPine  10 mg Per G Tube Daily    enoxaparin  40 mg Subcutaneous Daily    escitalopram oxalate  10 mg Per G Tube Daily    famotidine  20 mg Per G Tube BID    folic acid  1 mg Per G Tube Daily    guaifenesin 100 mg/5 ml  200 mg Per G Tube Q4H    methylPREDNISolone  48 mg Per G Tube Daily       Diagnostic Results: Labs: Reviewed    PRN Medications: acetaminophen, ALPRAZolam, dextrose 50%, dextrose 50%, glucagon (human recombinant), glucose, glucose, hepatitis B, hydrALAZINE, insulin aspart U-100, morphine, ondansetron, pneumoc 13-brandin conj-dip cr(PF), polyethylene glycol, promethazine, sodium chloride, sodium chloride 0.9%    Review of Systems   Constitutional: Positive for activity change. Negative for fatigue.   HENT: Positive for trouble swallowing. Negative for voice change.    Musculoskeletal: Positive for gait problem and myalgias.   Skin: Positive for rash (improving). Negative for color change.   Neurological: Positive for weakness. Negative for numbness.     Objective:     Vital Signs (Most Recent):  Temp: 98.6 °F (37 °C) (02/12/19 0758)  Pulse: 90 (02/12/19 0758)  Resp: 18 (02/12/19 0758)  BP: (!) 108/59 (02/12/19 0758)  SpO2: 96 % (02/12/19 0758)    Vital Signs (24h Range):  Temp:  [97.8 °F (36.6 °C)-100.1 °F (37.8 °C)] 98.6 °F (37 °C)  Pulse:  [] 90  Resp:  [16-18] 18  SpO2:  [96 %-100 %] 96 %  BP: (105-126)/(59-69) 108/59     Physical Exam   Constitutional: She is oriented to person, place, and time. She appears well-developed and well-nourished.   HENT:   Head: Normocephalic and atraumatic.   Eyes: Right eye exhibits no discharge. Left eye exhibits no discharge.   Neck: Neck supple.   Cardiovascular: Intact distal pulses.    Pulmonary/Chest: Effort normal. No respiratory distress.   Abdominal: Soft. There is no tenderness.   Musculoskeletal: She exhibits no deformity.   RUE: 3+/5.  LUE: 3+/5.  RLE: 3/5.  LLE: 4/5-all proximal strength      Neurological: She is alert and oriented to person, place, and time. No sensory deficit.   Skin: Skin is warm and dry.   Psychiatric: She has a normal mood and affect. Her behavior is normal.   Vitals reviewed.    NEUROLOGICAL EXAMINATION:     MENTAL STATUS   Oriented to person, place, and time.

## 2019-02-12 NOTE — ASSESSMENT & PLAN NOTE
-H/H stable   -Likely anemia of chronic disease component in setting of malignancy and myositis  -Iron studies normal Fe with increased ferritin, RDW increased, MCV normal 89

## 2019-02-12 NOTE — PROGRESS NOTES
[facility]  Physical Medicine & Rehab  Progress Note    Patient Name: Ermelinda Verde  MRN: [id number]  Admission Date: 1/22/2019  Length of Stay: 21 days  Attending Physician: Ashwin Stewart MD    Subjective:     Principal Problem:Myositis    Hospital Course:   01/31/2019: Participated with OT.  Sit to stand and transfers SV.  Ambulated 25 ft SV with 1 minor LOB with self-corrected.  Grooming SV.  02/07/2019: Participated with therapy.  Sit to stand (I) and transfers SBA & RW.  Ambulated 28 FT sba & rw.  02/9/2019: Participated with therapy.  Bed mobility SBA.  Sit to stand SV. UBD Sandra and grooming/toileting SBA.  02/11/2019: Participated with therapy.  Bed mobility, sit to stand  (I) and transfers (I)-SBA.  Ambulated 462 SV.      Interval History 2/12/2019:  Patient is seen for follow-up rehab evaluation and recommendations: Progressing with therapy.     HPI, Past Medical, Family, and Social History remains the same as documented in the initial encounter.    Scheduled Medications:    amLODIPine  10 mg Per G Tube Daily    enoxaparin  40 mg Subcutaneous Daily    escitalopram oxalate  10 mg Per G Tube Daily    famotidine  20 mg Per G Tube BID    folic acid  1 mg Per G Tube Daily    guaifenesin 100 mg/5 ml  200 mg Per G Tube Q4H    methylPREDNISolone  48 mg Per G Tube Daily       Diagnostic Results: Labs: Reviewed    PRN Medications: acetaminophen, ALPRAZolam, dextrose 50%, dextrose 50%, glucagon (human recombinant), glucose, glucose, hepatitis B, hydrALAZINE, insulin aspart U-100, morphine, ondansetron, pneumoc 13-brandin conj-dip cr(PF), polyethylene glycol, promethazine, sodium chloride, sodium chloride 0.9%    Review of Systems   Constitutional: Positive for activity change. Negative for fatigue.   HENT: Positive for trouble swallowing. Negative for voice change.    Musculoskeletal: Positive for gait problem and myalgias.   Skin: Positive for rash (improving). Negative for color change.    Neurological: Positive for weakness. Negative for numbness.     Objective:     Vital Signs (Most Recent):  Temp: 98.6 °F (37 °C) (02/12/19 0758)  Pulse: 90 (02/12/19 0758)  Resp: 18 (02/12/19 0758)  BP: (!) 108/59 (02/12/19 0758)  SpO2: 96 % (02/12/19 0758)    Vital Signs (24h Range):  Temp:  [97.8 °F (36.6 °C)-100.1 °F (37.8 °C)] 98.6 °F (37 °C)  Pulse:  [] 90  Resp:  [16-18] 18  SpO2:  [96 %-100 %] 96 %  BP: (105-126)/(59-69) 108/59     Physical Exam   Constitutional: She is oriented to person, place, and time. She appears well-developed and well-nourished.   HENT:   Head: Normocephalic and atraumatic.   Eyes: Right eye exhibits no discharge. Left eye exhibits no discharge.   Neck: Neck supple.   Cardiovascular: Intact distal pulses.   Pulmonary/Chest: Effort normal. No respiratory distress.   Abdominal: Soft. There is no tenderness.   Musculoskeletal: She exhibits no deformity.   RUE: 3+/5.  LUE: 3+/5.  RLE: 3/5.  LLE: 4/5-all proximal strength   Neurological: She is alert and oriented to person, place, and time. No sensory deficit.   Skin: Skin is warm and dry.   Psychiatric: She has a normal mood and affect. Her behavior is normal.   Vitals reviewed.    Assessment/Plan:      * Myositis    -rheumatology and Dermatology consulted  -proximal muscle weakness of shoulder with mild tenderness.  -MRI L humerus revealed diffuse edema without abscess or osteomyelitis  -Dermatitis noted on exam with skin bx consistent with dermatomyositis on 1/24  -Per Rheum:   -S/p IVIG x 5 days with improvement in strength. Will need IVIg again on 2/28/19.   - Medrol 48 mg oral daily   -MTX to 20 mg sc weekly (Tuesdays) with daily folic acid.     Anemia    -H/H stable   -Likely anemia of chronic disease component in setting of malignancy and myositis  -Iron studies normal Fe with increased ferritin, RDW increased, MCV normal 89     Impaired functional mobility and endurance    -2/2 myositis     Recommendations  -  Encourage  "mobility, OOB in chair, and early ambulation as appropriate  -  PT/OT evaluate and treat  -  Pain management  -  DVT prophylaxis  -  Monitor for and prevent skin breakdown and pressure ulcers  · Early mobility, repositioning/weight shifting every 20-30 minutes when sitting, turn patient every 2 hours, proper mattress/overlay and chair cushioning, pressure relief/heel protector boots         Dermatomyositis    -see myositis      Dysphagia    -s/p PEG placement 2/7     Swelling of upper arm    -BUE US negative for DVT     Dermatitis    -GI consulted  -EGD with bx for erosive esophagitis that are pending  - S/p fluconazole and nystatin     Adjustment disorder with depressed mood    -on SSRI     Breast cancer of upper-outer quadrant of left female breast    -s/p L breast mastectomy and s/p chemo 2017  -Per rheumatology, "hold paclitaxel agent and atezolizumb at this time - both can cause myositis."     Recommend Inpatient Rehab pending medical stability and insurance auth.  IRF preference per patient/[employer].       Larissa Lezama NP  Department of Physical Medicine & Rehab   [facility]  "

## 2019-02-12 NOTE — PLAN OF CARE
Problem: SLP Goal  Goal: SLP Goal  Speech-Language Pathology   Goals expected to be met by 2/20  1. Pt will complete dysphagia exercises x10 each with good ability to strengthen the swallowing musculature.   2. When deemed appropriate by SLP, pt with participate in ongoing assessment of swallow to determine readiness for repeat MBSS.      Outcome: Ongoing (interventions implemented as appropriate)    Recommend ongoing NPO with cont'd PEG tube for all nutrition, hydration, medication. Pt with improved hyolaryngeal excursion/ elevation via cervical palpation this service date. Therefore SLP POC inc'd to 3x/ week.     Team may wish to consider further assessment to rule out need for treatment of potential reflux. Pt with severe pyriform stasis at the level of the UES during completion of MBSS completed 2/6/19 and ongoing frequent expectoration of foamy oral secretions in the presence of continuous PEG tube feeds.    IRENA Khan, CCC-SLP  [phone]  2/12/2019

## 2019-02-12 NOTE — PLAN OF CARE
Problem: Adult Inpatient Plan of Care  Goal: Plan of Care Review  Outcome: Ongoing (interventions implemented as appropriate)  Patient remains free from falls and injury this shift. Bed in low, locked position with call bell in reach. Patient encouraged to call for assistance when getting out of bed. Patient verbalized understanding. All belongings within reach. VS stable. Afebrile. No complaints throughout shift. PEG tube in place- tube feeds at 65 ml/hr. Planned d/c to [facility] today pending insurance approval. Will continue to monitor.

## 2019-02-12 NOTE — PROGRESS NOTES
Follow up contact to [facility] for status of auth request.  Per [facility] they are still awaiting auth from [facility].  SWer remains available.

## 2019-02-12 NOTE — PT/OT/SLP PROGRESS
Occupational Therapy   Treatment    Name: Ermelinda Verde  MRN: [id number]  Admitting Diagnosis:  Myositis  5 Days Post-Op    Recommendations:     Discharge Recommendations: rehabilitation facility  Discharge Equipment Recommendations:  walker, rolling  Barriers to discharge:  None    Assessment:     Ermelinda Verde is a 58 y.o. female with a medical diagnosis of Myositis. Pt presented to OT with impaired endurance and weakness noted with minimal movement. Pt required a rest break after each set of UE exercises due to fatigue; pt recovered with pursed lip breathing. Pt cooperative, and tolerated OT session well with no c/o increased pain with activity. Pt motivated to reach all goals set in therapy. Pt will continue to benefit from skilled OT to build strength and endurance to increase independence in all ADLs/ IADLs.     Performance deficits affecting function are weakness, impaired endurance, impaired self care skills, decreased upper extremity function, decreased ROM, impaired cardiopulmonary response to activity, impaired functional mobilty.     Rehab Prognosis:  Good; patient would benefit from acute skilled OT services to address these deficits and reach maximum level of function.       Plan:     Patient to be seen 3 x/week to address the above listed problems via self-care/home management, therapeutic activities, therapeutic exercises  · Plan of Care Expires: 03/09/19  · Plan of Care Reviewed with: patient    Subjective     Pain/Comfort:  · Pain Rating 1: 0/10  · Pain Rating Post-Intervention 1: 0/10    Objective:     Communicated with: RN prior to session.  Patient found supine with PEG Tube upon OT entry to room.    General Precautions: Standard, fall, aspiration, NPO   Orthopedic Precautions:N/A   Braces: N/A     Occupational Performance:     Bed Mobility:    · Patient completed Rolling/Turning to Left with  modified independence  · Patient completed Rolling/Turning to Right with modified  independence  · Patient completed Scooting/Bridging with modified independence  · Patient completed Supine to Sit with supervision     Functional Mobility/Transfers:  · Patient completed Sit <> Stand Transfer from bed and chair with supervision  with  no assistive device   · Patient completed Bed <> Chair Transfer using Step Transfer technique with stand by assistance with no assistive device  · Functional Mobility: Pt ambulated ~200 ft with SBA and RW to build strength and endurance for self care tasks and functional transfers. 1 mild self corrected LOB noted when turning in hallway. No RBs required.     Activities of Daily Living:  · Upper Body Dressing: set up assistance to don gown as jacket in standing       [facility] 6 Click ADL: 20    Treatment & Education:  Educated pt on the following:  - OT POC  - Importance of compliance with HEP  - Importance of sitting UIC  - Energy conservation techniques: Take frequent rest breaks, pursed lip breathing  - Functional mobility/ transfer safety  - Self care safety/ independence   - Therapeutic exercise: BUE: AROM: UIC: Bicep curls x10, Arm raises x10, Chest presses x10, Overhead press x10, Chest pulls x10, Horizontal abd/adduction x10; UE exercises completed this day to build endurance to increase her self care independence.     Patient left up in chair with all lines intact, call button in reach and RN notifiedEducation:      GOALS:   Multidisciplinary Problems     Occupational Therapy Goals        Problem: Occupational Therapy Goal    Goal Priority Disciplines Outcome Interventions   Occupational Therapy Goal     OT, PT/OT Ongoing (interventions implemented as appropriate)    Description:  Updated Goals to be met by 7 days-- 2/16/19    Pt will complete UB dressing with Supervision.  Pt will complete functional mobility household distance with Supervision using AD as needed.- Progressing   Pt will be independent with UE HEP.- Progressing   Pt will increase UE strength to  WFL needed for ADLs/IADLs.- Progressing     Goals to be met by 2/5/2019 Extended to 2/16/19    Pt will be indep w/ BUE HEP. - Initiated   Pt will indep feed.                       Time Tracking:     OT Date of Treatment: 02/12/19  OT Start Time: 0918  OT Stop Time: 0945  OT Total Time (min): 27 min    Billable Minutes:Therapeutic Activity 15  Therapeutic Exercise 12    Madonna Mcclure, OT  2/12/2019

## 2019-02-12 NOTE — ASSESSMENT & PLAN NOTE
- DDX dermatomyositis de haylie vs related to immunotherapy  - trend CPK daily; overall downtrending generally with a slight bump on 01/27  -restarted fluids 01/26 due to NPO  - labs suspicious for myosits; rheum on board.   - F/u 1/24 skin biopsy.  -MRI left humerus suspicious for myositis    Per rheumatology  - Methylpred 125mg IV bid started, now tapered to 24mg IV bid, transitioned to PO medrol 48mg daily via PEG. Taper per rheumatology  - continue medrol 48mg PO daily on discharge to Rehab  - completed IVIg 400 mg/kg daily x 5 days. Will need IVIG 1 g/kg on 2/28/19.    - MTX to 20 mg sc weekly (Tuesdays) with daily folic acid.  will need prescription upon discharge and will also need safety labs repeated 4 weeks from now for MTX monitoring.  - myomarker panel, HMGCR, QG-TB pending  - PFTs as OP  - Quantitative IgA 533, IgG 932, IgM 66  - ID consulted for fast track vacc  - f/u with Dr. Swift and Dr. Campos in Rheumatology at discharge  - DISPO: pending d/c to rehab    Lab Results   Component Value Date     02/10/2019     (H) 02/09/2019     (H) 02/08/2019     (H) 02/07/2019     (H) 02/06/2019

## 2019-02-12 NOTE — TELEPHONE ENCOUNTER
[facility]

## 2019-02-12 NOTE — SUBJECTIVE & OBJECTIVE
Interval History: NAEON, strength improving. tolerating tube feeds well at goal rate of 65cc/hr. Dispo pending rehab placement this week.    Oncology Treatment Plan:   OP BREAST DOCETAXEL Q3W    Medications:  Continuous Infusions:    Scheduled Meds:   amLODIPine  10 mg Per G Tube Daily    enoxaparin  40 mg Subcutaneous Daily    escitalopram oxalate  10 mg Per G Tube Daily    famotidine  20 mg Per G Tube BID    folic acid  1 mg Per G Tube Daily    guaifenesin 100 mg/5 ml  200 mg Per G Tube Q4H    methylPREDNISolone  48 mg Oral Daily     PRN Meds:acetaminophen, ALPRAZolam, dextrose 50%, dextrose 50%, glucagon (human recombinant), glucose, glucose, hepatitis B, hydrALAZINE, insulin aspart U-100, morphine, ondansetron, pneumoc 13-brandin conj-dip cr(PF), polyethylene glycol, promethazine, sodium chloride, sodium chloride 0.9%     Review of Systems   Constitutional: Negative for activity change, appetite change, chills, diaphoresis, fatigue, fever and unexpected weight change.   HENT: Positive for trouble swallowing. Negative for congestion, ear discharge, facial swelling, hearing loss, postnasal drip, sinus pressure, sneezing, sore throat and tinnitus.    Eyes: Negative for photophobia, pain and redness.   Respiratory: Negative for apnea, cough, choking, chest tightness, shortness of breath and stridor.    Cardiovascular: Negative for chest pain, palpitations and leg swelling.   Gastrointestinal: Negative for abdominal pain, anal bleeding, constipation, diarrhea, nausea and rectal pain.   Endocrine: Negative for polyuria.   Genitourinary: Negative for dysuria and hematuria.   Musculoskeletal: Negative for arthralgias, back pain, gait problem, joint swelling, myalgias, neck pain and neck stiffness.   Skin: Positive for rash (improving). Negative for color change and pallor.   Allergic/Immunologic: Negative for immunocompromised state.   Neurological: Positive for weakness. Negative for dizziness, seizures and  numbness.   Hematological: Positive for adenopathy. Does not bruise/bleed easily.   Psychiatric/Behavioral: Negative for agitation and behavioral problems.     Objective:     Vital Signs (Most Recent):  Temp: 97.8 °F (36.6 °C) (02/12/19 0342)  Pulse: 92 (02/12/19 0342)  Resp: 18 (02/12/19 0342)  BP: 126/65 (02/12/19 0342)  SpO2: 97 % (02/12/19 0342) Vital Signs (24h Range):  Temp:  [97.8 °F (36.6 °C)-100.1 °F (37.8 °C)] 97.8 °F (36.6 °C)  Pulse:  [] 92  Resp:  [16-19] 18  SpO2:  [97 %-100 %] 97 %  BP: (105-126)/(55-69) 126/65     Weight: 73.7 kg (162 lb 7.7 oz)  Body mass index is 27.04 kg/m².  Body surface area is 1.84 meters squared.      Intake/Output Summary (Last 24 hours) at 2/12/2019 0620  Last data filed at 2/11/2019 2212  Gross per 24 hour   Intake 1522 ml   Output 720 ml   Net 802 ml       Physical Exam   Constitutional: She is oriented to person, place, and time. She appears well-developed and well-nourished. No distress.   HENT:   Head: Atraumatic.   Nose: Nose normal.   Mouth/Throat: No oropharyngeal exudate.   Eyes: Conjunctivae and EOM are normal. Pupils are equal, round, and reactive to light. Right eye exhibits no discharge. Left eye exhibits no discharge. No scleral icterus.   Neck: Normal range of motion. Neck supple. No tracheal deviation present.   Cardiovascular: Normal rate, regular rhythm and intact distal pulses. Exam reveals no gallop and no friction rub.   Pulmonary/Chest: Effort normal and breath sounds normal. No respiratory distress. She has no wheezes. She has no rales. She exhibits no tenderness.   Abdominal: Soft. Bowel sounds are normal. She exhibits no distension and no mass. There is no tenderness. There is no rebound and no guarding.   PEG tube in situ   Musculoskeletal: Normal range of motion. She exhibits no tenderness or deformity.   Neurological: She is alert and oriented to person, place, and time. No cranial nerve deficit or sensory deficit.     4.5/5 shoulder  strength on shoulder abduction; improving  5/5 flexion and extension preserved at elbow   Skin: Skin is warm and dry. Capillary refill takes less than 2 seconds. No rash noted. She is not diaphoretic. No erythema. No pallor.   Psychiatric: She has a normal mood and affect.   Vitals reviewed.      Significant Labs:   BMP:   No results for input(s): GLU, NA, K, CL, CO2, BUN, CREATININE, CALCIUM, MG in the last 48 hours., CBC:   Recent Labs   Lab 02/11/19  0400   WBC 4.57   HGB 7.2*   HCT 22.7*      , CMP:   No results for input(s): NA, K, CL, CO2, GLU, BUN, CREATININE, CALCIUM, PROT, ALBUMIN, BILITOT, ALKPHOS, AST, ALT, ANIONGAP, EGFRNONAA in the last 48 hours.    Invalid input(s): ESTGFAFRICA, Coagulation: No results for input(s): PT, INR, APTT in the last 48 hours., Haptoglobin: No results for input(s): HAPTOGLOBIN in the last 48 hours., Immunology: No results for input(s): SPEP, ERVIN, DARCY, FREELAMBDALI in the last 48 hours., LDH: No results for input(s): LDHCSF, BFSOURCE in the last 48 hours., LFTs:   No results for input(s): ALT, AST, ALKPHOS, BILITOT, PROT, ALBUMIN in the last 48 hours., Reticulocytes:   No results for input(s): RETIC in the last 48 hours., Tumor Markers: No results for input(s): PSA, CEA, , AFPTM, [id number],  in the last 48 hours.    Invalid input(s): ALGTM, Uric Acid No results for input(s): URICACID in the last 48 hours., Urine Studies: No results for input(s): COLORU, APPEARANCEUA, PHUR, SPECGRAV, PROTEINUA, GLUCUA, KETONESU, BILIRUBINUA, OCCULTUA, NITRITE, UROBILINOGEN, LEUKOCYTESUR, RBCUA, WBCUA, BACTERIA, SQUAMEPITHEL, HYALINECASTS in the last 48 hours.    Invalid input(s): WRIGHTSUR and All pertinent labs from the last 24 hours have been reviewed.    Diagnostic Results:  EXAMINATION:  FL MODIFIED BARIUM SWALLOW SPEECH STUDY    CLINICAL HISTORY:  repeat to determine diet initiation.;    TECHNIQUE:  Modified Barium Swallow Study. Video fluoroscopic swallowing examination was  performed in conjunction with the speech pathology department.  Various liquid and solid food substances were used to assess swallowing.    Fluoroscopy Time: 1.9 minutes    COMPARISON:  Fluoroscopic modified barium swallow 01/28/2019      Impression       Transit: Delayed oral transit time. Significant vallecular and pyriform recess residual/layering.    Penetration/Aspiration: Aspiration following the consumption of liquids.  Significant stasis of puree food with high risk of aspiration.    Other: N/A.    See speech pathology report for further details.    Electronically signed by resident: Latrell Garcia MD  Date: 02/06/2019  Time: 15:43    Electronically signed by: Shay Reddy MD  Date: 02/06/2019

## 2019-02-12 NOTE — PROGRESS NOTES
"[facility] [address]  Adult Nutrition  Progress Note    SUMMARY       Recommendations    Recommendation/Intervention: 1. PROVIDE 200 CC WATER FLUSHES VIA PEG q 6 HRS TO MEET 1 carrie/cc requirement    2. MONITOR WEIGHT'S for wt loss  as indication pt's tube feeding rate may need to be increased    Goals: 1.TOLERATE TUBE FEEDS w/ Isocosurce 1.5 at 65cc/hr    2. MAINTAIN WEIGHT  3.Maintain HYDRATION AND NORMALIZE LABS  4. Advance po diet at discretion of Sp TX   Nutrition Goal Status: Progressing towards goal  Communication of RD Recs: other (comment)    Reason for Assessment    Reason For Assessment: RD follow-up  Diagnosis: (Myositis)  Relevant Medical History: breast cancer. Chemo , Diverticulosis, HTN, Gastritis, Pre-diabetes, Vit B 12 deficiency , moderate malnutrition , dysphagia - new onset  this admit, s/p PEG   Interdisciplinary Rounds: attended    General Information Comments: s/p PEG on .Pt reports no GI intolernces to tube feeds, but does report hunger .   NFPE COMPLETED   . NUTRITIONAL STATUS CONTINUES, PROGRESSING TOWARD GOALS . WT STABLE SINCE PEG FEEDS INITIATED   Nutrition Discharge Plannin. Resume oral diet at discretion of Sp tx.     Nutrition Risk Screen    Nutrition Risk Screen: tube feeding or parenteral nutrition    Nutrition/Diet History    Patient Reported Diet/Restrictions/Preferences: other (see comments)  Spiritual, Cultural Beliefs, Christianity Practices, Values that Affect Care: no  Factors Affecting Nutritional Intake: difficulty/impaired swallowing    Anthropometrics    Temp: 98.4 °F (36.9 °C)  Height: 5' 5" (165.1 cm)  Height (inches): 65 in  Weight Method: Standard Scale  Weight: 73.7 kg (162 lb 7.7 oz)  Weight (lb): 162.48 lb  Ideal Body Weight (IBW), Female: 125 lb  % Ideal Body Weight, Female (lb): 140.83 lb  BMI (Calculated): 29.4  BMI Grade: 30 - 34.9- obesity - grade I  Weight Loss: unintentional  Usual Body Weight (UBW), kg: (180-185#)  % Usual Body Weight: " 89.01  % Weight Change From Usual Weight: -11.18 %  Weight Loss Since Admission: 20 lb  % Weight Change Since Admission: 10%       Lab/Procedures/Meds    Pertinent Labs Reviewed: reviewed  Pertinent Labs Comments: 2/10 alb 2.4 (2.6- on admit)), Bun 4,  Pertinent Medications Reviewed: reviewed  Pertinent Medications Comments: folic acid 1mg , methylprednidone 48 mg    Physical Findings/Assessment      Malnutrition Type: acute illness or injury  Energy Intake: severe energy intake  Weight Loss (Malnutrition): greater than 7.5% in 3 months(11.2% x 3 mo)  Energy Intake (Malnutrition): less than or equal to 50% for greater than or equal 1 month        Estimated/Assessed Needs    Weight Used For Calorie Calculations: (73.7 kg)  Energy Calorie Requirements (kcal): 1730-0985  Energy Need Method: Kcal/kg  Protein Requirements: 103-118g  Weight Used For Protein Calculations: 73.7 kg (162 lb 7.7 oz)     Estimated Fluid Requirement Method: RDA Method  RDA Method (mL): 2265  CHO Requirement: NA      Nutrition Prescription Ordered    Current Diet Order: NPO  Current Nutrition Support Formula Ordered: Isosource 1.5  Current Nutrition Support Rate Ordered: (65ml/hr)    Evaluation of Received Nutrient/Fluid Intake    Enteral Calories (kcal): 2340  Enteral Protein (gm): 106  Enteral (Free Water) Fluid (mL): (1192)  % Kcal Needs: 100  % Protein Needs: 90%  I/O: -1166 SA  Energy Calories Required: meeting needs  Protein Required: meeting needs  Fluid Required: meeting needs  Comments: LBM 2/11/19  Tolerance: tolerating  % Intake of Estimated Energy Needs: 75 - 100 %  % Meal Intake: NPO    Nutrition Risk    Level of Risk/Frequency of Follow-up: low - moderate     Assessment and Plan  Nutrition Problem   INCREASED NUTRIENT NEEDS    Related to (etiology):   CANCER  AND INFLAMMATION (MYOSITIS)    Signs and Symptoms (as evidenced by):   20# WT LOSS IN 1 MONTH    Interventions/Recommendations (treatment strategy):  1. CONTINUE TUBE FEEDS AT  65 CC/HR  2. MONITOR PERTINENT LABS- ALBUMIN, LYTES , GLUC ETC FOR ADJUSTMENTS IN TUBE FFEDS     Nutrition Diagnosis Status:   Continues         Monitor and Evaluation    Food and Nutrient Intake: enteral nutrition intake  Food and Nutrient Adminstration: diet order, enteral and parenteral nutrition administration  Anthropometric Measurements: weight, weight change, body mass index  Biochemical Data, Medical Tests and Procedures: electrolyte and renal panel, glucose/endocrine profile  Nutrition-Focused Physical Findings: overall appearance, extremities, muscles and bones     Malnutrition Assessment  Malnutrition Type: acute illness or injury  Energy Intake: (meeting needs now)          Weight Loss (Malnutrition): greater than 7.5% in 3 months(11.2% x 3 mo)  Energy Intake (Malnutrition): less than or equal to 50% for greater than or equal to 1 month  Subcutaneous Fat (Malnutrition): mild depletion  Muscle Mass (Malnutrition): mild depletion  Fluid Accumulation (Malnutrition): mild   Orbital Region (Subcutaneous Fat Loss): mild depletion  Upper Arm Region (Subcutaneous Fat Loss): mild depletion  Thoracic and Lumbar Region: mild depletion   [place]ash[place] Region (Muscle Loss): mild depletion  Clavicle Bone Region (Muscle Loss): mild depletion  Patellar Region (Muscle Loss): mild depletion  Anterior Thigh Region (Muscle Loss): mild depletion  Posterior Calf Region (Muscle Loss): mild depletion   Edema (Fluid Accumulation): 1-->trace   Subcutaneous Fat Loss (Final Summary): mild protein-calorie malnutrition  Muscle Loss Evaluation (Final Summary): mild protein-calorie malnutrition         Nutrition Follow-Up    RD Follow-up?: Yes

## 2019-02-12 NOTE — PROGRESS NOTES
[facility]  Hematology/Oncology  Progress Note    Patient Name: Ermelinda Verde  Admission Date: 1/22/2019  Hospital Length of Stay: 21 days  Code Status: Full Code      Subjective:     HPI:  Ms Verde is a 57 yo F with a prior diagnosis of L sided breast cancer, s/p 3 cycles of nab-paclitaxel and atezolizumab who presents from clinic with a roughly week long, multi-day history of proximal muscle weakness in the shoulder girdle muscles, bilateral and associated with mild tenderness. She reports generalized weakness, but strength impairment with the use of her upper extremities more than lower extremities, and her ADLs are intact. Her last PET scan in December 2018 showed almost complete resolution of her adenopathy, and she had been complaining of a rash, which had been attributed to Nab paclitaxel. Last week, 1/15, she had a CT neck/ since there was concern for facial swelling per symptoms, and the CT showed lymphadenopathy without airway or vascular compromise. 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 Most recently, the patient had been on 60mg of prednisone with a taper and had noted swelling, proximal weakness. She also notes some progressive swallowing difficulty, but attributes this to candidal thrush for which she takes nystatin scheduled. She reports poor PO intake preceding admission, dark, mario colored urine, but denies fevers or joint pain.      Onc History per Dr. Reyna:   She developed a palpable abnormality in her left breast in January 2017 which she noted on self-examination.  A diagnostic mammogram on January 19 showed a greater than 1 cm nodule in the upper outer portion of left breast.  By ultrasound this was lobulated and hypoechoic measuring 1.75 x 1.51 x 1.96 cm.     On January 24,  2017 a core needle biopsy was performed which showed infiltrating ductal carcinoma, high grade.  The tumor was ER negative, AZ negative, and HER-2 negative.  A follow-up ultrasound on December 6 showed 2.5 x 2.2 x 1.5 cm left breast mass.  There was no abnormality noted in the left axilla.     She underwent sentinel lymph node biopsy on February 22.  That showed 4 negative lymph nodes.     She had 4 cycles of  Wilbur-adjuvantTaxotere and Cytoxan completed  on 5/9/17.     On June 26 she underwent left mastectomy.  That revealed 2 foci of invasive high-grade carcinoma measuring 14 mm and 1.5 mm.  Margins were negative.     She completed 4 cycles of adjuvant Adriamycin on September 12, 2017.     In October, she developed some left supraclavicular lymphadenopathy which turned out to be recurrence.     A fine-needle aspirate of the lymph node was performed on October 19th.  That showed metastatic carcinoma consistent with breast primary which was ER negative, AZ negative and HER 2-negative.           Interval History: NAEON, strength improving. tolerating tube feeds well at goal rate of 65cc/hr. Dispo pending rehab placement this week.    Oncology Treatment Plan:   OP BREAST DOCETAXEL Q3W    Medications:  Continuous Infusions:    Scheduled Meds:   amLODIPine  10 mg Per G Tube Daily    enoxaparin  40 mg Subcutaneous Daily    escitalopram oxalate  10 mg Per G Tube Daily    famotidine  20 mg Per G Tube BID    folic acid  1 mg Per G Tube Daily    guaifenesin 100 mg/5 ml  200 mg Per G Tube Q4H    methylPREDNISolone  48 mg Oral Daily     PRN Meds:acetaminophen, ALPRAZolam, dextrose 50%, dextrose 50%, glucagon (human recombinant), glucose, glucose, hepatitis B, hydrALAZINE, insulin aspart U-100, morphine, ondansetron, pneumoc 13-brandin conj-dip cr(PF), polyethylene glycol, promethazine, sodium chloride, sodium chloride 0.9%     Review of Systems   Constitutional: Negative for activity change, appetite change, chills,  diaphoresis, fatigue, fever and unexpected weight change.   HENT: Positive for trouble swallowing. Negative for congestion, ear discharge, facial swelling, hearing loss, postnasal drip, sinus pressure, sneezing, sore throat and tinnitus.    Eyes: Negative for photophobia, pain and redness.   Respiratory: Negative for apnea, cough, choking, chest tightness, shortness of breath and stridor.    Cardiovascular: Negative for chest pain, palpitations and leg swelling.   Gastrointestinal: Negative for abdominal pain, anal bleeding, constipation, diarrhea, nausea and rectal pain.   Endocrine: Negative for polyuria.   Genitourinary: Negative for dysuria and hematuria.   Musculoskeletal: Negative for arthralgias, back pain, gait problem, joint swelling, myalgias, neck pain and neck stiffness.   Skin: Positive for rash (improving). Negative for color change and pallor.   Allergic/Immunologic: Negative for immunocompromised state.   Neurological: Positive for weakness. Negative for dizziness, seizures and numbness.   Hematological: Positive for adenopathy. Does not bruise/bleed easily.   Psychiatric/Behavioral: Negative for agitation and behavioral problems.     Objective:     Vital Signs (Most Recent):  Temp: 97.8 °F (36.6 °C) (02/12/19 0342)  Pulse: 92 (02/12/19 0342)  Resp: 18 (02/12/19 0342)  BP: 126/65 (02/12/19 0342)  SpO2: 97 % (02/12/19 0342) Vital Signs (24h Range):  Temp:  [97.8 °F (36.6 °C)-100.1 °F (37.8 °C)] 97.8 °F (36.6 °C)  Pulse:  [] 92  Resp:  [16-19] 18  SpO2:  [97 %-100 %] 97 %  BP: (105-126)/(55-69) 126/65     Weight: 73.7 kg (162 lb 7.7 oz)  Body mass index is 27.04 kg/m².  Body surface area is 1.84 meters squared.      Intake/Output Summary (Last 24 hours) at 2/12/2019 0620  Last data filed at 2/11/2019 2212  Gross per 24 hour   Intake 1522 ml   Output 720 ml   Net 802 ml       Physical Exam   Constitutional: She is oriented to person, place, and time. She appears well-developed and well-nourished.  No distress.   HENT:   Head: Atraumatic.   Nose: Nose normal.   Mouth/Throat: No oropharyngeal exudate.   Eyes: Conjunctivae and EOM are normal. Pupils are equal, round, and reactive to light. Right eye exhibits no discharge. Left eye exhibits no discharge. No scleral icterus.   Neck: Normal range of motion. Neck supple. No tracheal deviation present.   Cardiovascular: Normal rate, regular rhythm and intact distal pulses. Exam reveals no gallop and no friction rub.   Pulmonary/Chest: Effort normal and breath sounds normal. No respiratory distress. She has no wheezes. She has no rales. She exhibits no tenderness.   Abdominal: Soft. Bowel sounds are normal. She exhibits no distension and no mass. There is no tenderness. There is no rebound and no guarding.   PEG tube in situ   Musculoskeletal: Normal range of motion. She exhibits no tenderness or deformity.   Neurological: She is alert and oriented to person, place, and time. No cranial nerve deficit or sensory deficit.     4.5/5 shoulder strength on shoulder abduction; improving  5/5 flexion and extension preserved at elbow   Skin: Skin is warm and dry. Capillary refill takes less than 2 seconds. No rash noted. She is not diaphoretic. No erythema. No pallor.   Psychiatric: She has a normal mood and affect.   Vitals reviewed.      Significant Labs:   BMP:   No results for input(s): GLU, NA, K, CL, CO2, BUN, CREATININE, CALCIUM, MG in the last 48 hours., CBC:   Recent Labs   Lab 02/11/19  0400   WBC 4.57   HGB 7.2*   HCT 22.7*      , CMP:   No results for input(s): NA, K, CL, CO2, GLU, BUN, CREATININE, CALCIUM, PROT, ALBUMIN, BILITOT, ALKPHOS, AST, ALT, ANIONGAP, EGFRNONAA in the last 48 hours.    Invalid input(s): ESTGFAFRICA, Coagulation: No results for input(s): PT, INR, APTT in the last 48 hours., Haptoglobin: No results for input(s): HAPTOGLOBIN in the last 48 hours., Immunology: No results for input(s): SPEP, ERVIN, DARCY, FREELAMBDALI in the last 48 hours.,  LDH: No results for input(s): LDHCSF, BFSOURCE in the last 48 hours., LFTs:   No results for input(s): ALT, AST, ALKPHOS, BILITOT, PROT, ALBUMIN in the last 48 hours., Reticulocytes:   No results for input(s): RETIC in the last 48 hours., Tumor Markers: No results for input(s): PSA, CEA, , AFPTM, [id number],  in the last 48 hours.    Invalid input(s): ALGTM, Uric Acid No results for input(s): URICACID in the last 48 hours., Urine Studies: No results for input(s): COLORU, APPEARANCEUA, PHUR, SPECGRAV, PROTEINUA, GLUCUA, KETONESU, BILIRUBINUA, OCCULTUA, NITRITE, UROBILINOGEN, LEUKOCYTESUR, RBCUA, WBCUA, BACTERIA, SQUAMEPITHEL, HYALINECASTS in the last 48 hours.    Invalid input(s): WRIGHTSUR and All pertinent labs from the last 24 hours have been reviewed.    Diagnostic Results:  EXAMINATION:  FL MODIFIED BARIUM SWALLOW SPEECH STUDY    CLINICAL HISTORY:  repeat to determine diet initiation.;    TECHNIQUE:  Modified Barium Swallow Study. Video fluoroscopic swallowing examination was performed in conjunction with the speech pathology department.  Various liquid and solid food substances were used to assess swallowing.    Fluoroscopy Time: 1.9 minutes    COMPARISON:  Fluoroscopic modified barium swallow 01/28/2019      Impression       Transit: Delayed oral transit time. Significant vallecular and pyriform recess residual/layering.    Penetration/Aspiration: Aspiration following the consumption of liquids.  Significant stasis of puree food with high risk of aspiration.    Other: N/A.    See speech pathology report for further details.    Electronically signed by resident: Latrell Garcia MD  Date: 02/06/2019  Time: 15:43    Electronically signed by: Shay Reddy MD  Date: 02/06/2019         Assessment/Plan:     * Myositis    - DDX dermatomyositis de haylie vs related to immunotherapy  - trend CPK daily; overall downtrending generally with a slight bump on 01/27  -restarted fluids 01/26 due to NPO  - labs suspicious  for myosits; rheum on board.   - F/u 1/24 skin biopsy.  -MRI left humerus suspicious for myositis    Per rheumatology  - Methylpred 125mg IV bid started, now tapered to 24mg IV bid, transitioned to PO medrol 48mg daily via PEG. Taper per rheumatology  - continue medrol 48mg PO daily on discharge to Rehab  - completed IVIg 400 mg/kg daily x 5 days. Will need IVIG 1 g/kg on 2/28/19.    - MTX to 20 mg sc weekly (Tuesdays) with daily folic acid.  will need prescription upon discharge and will also need safety labs repeated 4 weeks from now for MTX monitoring.  - myomarker panel, HMGCR, QG-TB pending  - PFTs as OP  - Quantitative IgA 533, IgG 932, IgM 66  - ID consulted for fast track vacc  - f/u with Dr. Swift and Dr. Campos in Rheumatology at discharge  - DISPO: pending d/c to rehab    Lab Results   Component Value Date     02/10/2019     (H) 02/09/2019     (H) 02/08/2019     (H) 02/07/2019     (H) 02/06/2019        Anemia    Hb 7.0 today (2/10/2019) has been downtrending since Hb 10.3 on admit 1/23  - Iron studies normal Fe with increased ferritin, RDW increased, MCV normal 89  - No signs of melena or hematochezia, no vomits. BUN 24 Cr 0.5, but on high protein tube feeds  - Likely anemia of chronic disease component in setting of malignancy and myositis     Hypokalemia    Replete prn     Dysphagia    Patient is experiencing difficulty swallowing that has been increasing in severity over last couple of days to the point where patient did not feel as if she could swallow.  As of 01/26, SLP evaluated patient and determined that she should be NPO except for medications until a modified barium swallow could be performed. Possibly candidal esophagitis; patient has oral thrush.    Plan:  - High aspiration risk on modified barium study, strict NPO  - NGT in situ, tube feeds initiated per dietary consult  - Switched to IV meds where possible  - Fluconazole IVPB 200mg q24hr (now d/c'd)  - GI  consulted, EGD resulted w/ Grade C erosive esophagitis, no candidal infxn identified. Bx obtained, GI recs start PPI bid  - Failed swallow study after NGT removed  - PEG completed 2/7  - Dietary/nutrition consult for tube feeds and education  - Tube feedings started 2/8, tolerating well. Continue to titrate  - Medications per PEG tube  - Can reverse PEG when swallowing is passable  - SLP rehab services  -- Tube feeds at goal (65 cc/hr); tolerating well     Swelling of upper arm    -B/L U/S negative for DVTs  -appears less edematous than day prior  -continue to monitor     Dermatitis    - Skin biopsy from 1/24 pending, appreciate dermatology recs     Candidiasis    Oral thrush  -cont Nystatin swish and swallow. Additionally, added fluconaozle 200 mg Po qday on 01/26 as possibility patient is experiencing esophagitis 2/2 to candida  -appears resolved now s/p fluconazole and nystatin     Rhabdomyolysis    -see myositis. Continue supportive care, Cr/ electrolyte/ CPK monitoring and aggressive hydration. transaminitis on CMP is likely due to skeletal muscle rhabdo, not hepatic     Drug rash    - Unclear if symptoms related to chemotherapy     Pre-diabetes    -check a1c, 0-5u SSI while on steroids, add prandial insulin as warranted.      Adjustment disorder with depressed mood    -cont home SSRI     Vitamin B12 deficiency    -cont home B12 supplementation      Breast cancer of upper-outer quadrant of left female breast    -On January 24, 2017 a core needle biopsy was performed which showed infiltrating ductal carcinoma, high grade.  The tumor was ER negative, GA negative, and HER-2 negative.  -She had 4 cycles of  Wilbur-adjuvantTaxotere and Cytoxan completed  on 5/9/17.  -On June 26 she underwent left mastectomy.  That revealed 2 foci of invasive high-grade carcinoma measuring 14 mm and 1.5 mm.  Margins were negative.  -She completed 4 cycles of adjuvant Adriamycin on September 12, 2017.  -  A fine-needle aspirate of the  lymph node was performed on October 19th.  That showed metastatic carcinoma consistent with breast primary which was ER negative, AR negative and HER 2-negative.    -Per rheumatology: hold paclitaxel agent and atezolizumb at this time - both can cause myositis      Hypertension    -on home amlodipine-benazepril; will hold ACEi for now and observe renal function with CPK elevation in rhabdo.     Plan:  - Amlodipine 10 mg qday on admission  - 1/28 Pt now strict NPO due to failed swallow study, now on hydralazine 10mg IV q8h WCTM and adjust PRN              Nuno Prasad MD  Hematology/Oncology  [facility]        ATTENDING NOTE, ONCOLOGY INPATIENT TEAM    As above; events of last 24 hours noted.  Patient seen and examined, chart reviewed.  Appears comfortable, in NAD.  Lungs are clear to auscultation.  Abdomen is soft, nontender.  Labs reviewed.    PLAN  Repeat labs in am; we will consider blood transfusions given her symptomatic anemia.  She will receive her MTX today, and remain on daily methlprednisolone.  Contineu enoxaparin for DVt prophylaxis.  PT/OT  Awaiting transfer [facility].  Her multiple questions were answered to her satisfaction.      Ashwin Stewart MD

## 2019-02-12 NOTE — PLAN OF CARE
Problem: Adult Inpatient Plan of Care  Goal: Plan of Care Review  Outcome: Ongoing (interventions implemented as appropriate)  Patient remains free from falls and injury this shift. Bed in low, locked position with call light in reach. PEG tube with small amount of bloody drainage around the site. Dressing changed. Tolerating feeds at goal rate of 65. Meds crushed and given via PEG. Patient ambulated in [place] several times this shift. Awaiting insurance approval before transferring to rehab. Pt denies pain, n/v/d, VS stable. Patient encouraged to call for assistance when needed.  Patient verbalized understanding. All belongings within reach.  Will continue to monitor.

## 2019-02-12 NOTE — PLAN OF CARE
Patient remains free from falls and injury this shift. Bed in low, locked position with call bell in reach. Patient encouraged to call for assistance when getting out of bed. Patient verbalized understanding. All belongings within reach. VS stable. Afebrile. No complaints throughout shift. PEG tube in place- tube feeds at 65 ml/hr. Changed Right chest port dressing and needle as it was due today. Received Methotrexate injection IM per ords to Right Buttocks. Planned d/c to [facility] today pending insurance approval. Will continue to monitor.

## 2019-02-12 NOTE — PT/OT/SLP PROGRESS
"Speech Language Pathology Treatment    Patient Name:  Ermelinda Verde   MRN:  [id number]   [id number]    Admitting Diagnosis: Myositis    Recommendations:                 General Recommendations:  Dysphagia therapy  Diet recommendations:  NPO, Liquid Diet Level: NPO   Aspiration Precautions: Strict aspiration precautions   · Should pt experience difficulty triggering volitional swallows necessary for completion of dysphagia exercises, tsp thin water x5 MAX, 3x/ day MAX may be provided with strict implementation of ALL aspiration precautions:  Thin water via tsp ONLY- NO open cup sips  Thin water via tsp x5 MAX, 3x/ day MAX to aid ability to trigger ongoing volitional swallows necessary for completion of dysphagia exercises- NOT for pleasure  Fully upright position   1 tsp sip @ a time  Refrain from talking prior to swallow completion   Discontinue PO upon: coughing, throat clearing, choking, wet vocal quality, wet breath sounds, watery eyes, reddened facial features  General Precautions: Standard, aspiration, fall, NPO    Subjective     "It comes and it goes." Pt re: ability to trigger volitional swallow, improved compared to inability to trigger volitional swallow during prior SLP session provided 2/8/19.     Pain/Comfort:  · Pain Rating 1: 0/10  · Pain Rating Post-Intervention 1: 0/10    Objective:     Has the patient been evaluated by SLP for swallowing?   Yes  Keep patient NPO? Yes   Current Respiratory Status: room air      Pt awake and alert upon entry, sitting fully upright in bedside chair. Throughout session, pt observed to spit out foamy oral secretions x5-6. Per pt, spits out these oral secretions 2/2 habit, as unable to trigger swallow to manage oral secretions until recently, as well as 2/2 inconsistent ability to trigger swallow to manage these secretions. Although ability to trigger swallow for oral secretion management inconsistent, improved compared to inability to trigger volitional swallow at all " during prior SLP session provided 2/8/19. Given model to initiate, pt completed the following dysphagia exercises x5 each with good effort: effortful swallow, ehsan, supraglottic, BOT /k/ and /g/, and falsetto /I/. Via cervical palpation, inconsistent hyolaryngeal excursion and elevation observed, consistent with pt's report of inconsistent ability to trigger swallow to manage oral secretions. Across effortful swallow, ehsan, and supraglottic exercises, PO trials thin water via tsp consumed x3 to moisten oral cavity to aid ability to trigger ongoing volitional swallows. Immediate cough observed on 2/3 trials. Although overt clinical signs aspiration observed, felt improved compared to unappreciated sensory response during aspiration of PO trials provided during MBSS completed 2/6/19. Education provided re: ongoing completion of dysphagia exercises 5-10x/ each, 3x/ day, tsp thin water x5 MAX, 3x/ day MAX to aid ability to trigger ongoing volitional swallows for completion of dysphagia exercises with strict implementation of all aspiration precautions listed above and updated SLP POC. Pt verbalized understanding of all education provided and agreement with SLP POC. White board current. No further questions.     Assessment:     Ermelinda Verde is a 58 y.o. female with an SLP diagnosis of dysphagia.     Goals:   Multidisciplinary Problems     SLP Goals        Problem: SLP Goal    Goal Priority Disciplines Outcome   SLP Goal     SLP Ongoing (interventions implemented as appropriate)   Description:  Speech-Language Pathology   Goals expected to be met by 2/20  1. Pt will complete dysphagia exercises x10 each with good ability to strengthen the swallowing musculature.   2. When deemed appropriate by SLP, pt with participate in ongoing assessment of swallow to determine readiness for repeat MBSS.                     Plan:     · Patient to be seen:  3 x/week   · Plan of Care expires:  02/24/19  · Plan of Care reviewed  with:  patient   · SLP Follow-Up:  Yes       Discharge recommendations:  rehabilitation facility     Time Tracking:     SLP Treatment Date:   02/12/19  Speech Start Time:  1206  Speech Stop Time:  1231     Speech Total Time (min):  25 min    Billable Minutes: Treatment Swallowing Dysfunction 13 and Seld Care/Home Management Training 12    IRENA Khan, CCC-SLP  [phone]  2/12/2019

## 2019-02-12 NOTE — PLAN OF CARE
Problem: Adult Inpatient Plan of Care  Goal: Plan of Care Review  Recommendations     Recommendation/Intervention: 1. PROVIDE 200 CC WATER FLUSHES VIA PEG q 6 HRS TO MEET 1 acrrie/cc requirement     2. MONITOR WEIGHT'S for wt loss  as indication pt's tube feeding rate may need to be increased     Goals: 1.TOLERATE TUBE FEEDS w/ Isocosurce 1.5 at 65cc/hr    2. MAINTAIN WEIGHT  3.Maintain HYDRATION AND NORMALIZE LABS  4. Advance po diet at discretion of Sp [place].     Nutrition Goal Status: Progressing towards goal. Weight maintained since initiating  tube feeds

## 2019-02-12 NOTE — ASSESSMENT & PLAN NOTE
Patient is experiencing difficulty swallowing that has been increasing in severity over last couple of days to the point where patient did not feel as if she could swallow.  As of 01/26, SLP evaluated patient and determined that she should be NPO except for medications until a modified barium swallow could be performed. Possibly candidal esophagitis; patient has oral thrush.    Plan:  - High aspiration risk on modified barium study, strict NPO  - NGT in situ, tube feeds initiated per dietary consult  - Switched to IV meds where possible  - Fluconazole IVPB 200mg q24hr (now d/c'd)  - GI consulted, EGD resulted w/ Grade C erosive esophagitis, no candidal infxn identified. Bx obtained, GI recs start PPI bid  - Failed swallow study after NGT removed  - PEG completed 2/7  - Dietary/nutrition consult for tube feeds and education  - Tube feedings started 2/8, tolerating well. Continue to titrate  - Medications per PEG tube  - Can reverse PEG when swallowing is passable  - SLP rehab services  -- Tube feeds at goal (65 cc/hr); tolerating well

## 2019-02-13 ENCOUNTER — TELEPHONE (OUTPATIENT)
Dept: HEMATOLOGY/ONCOLOGY | Facility: CLINIC | Age: 59
End: 2019-02-13

## 2019-02-13 ENCOUNTER — TELEPHONE (OUTPATIENT)
Dept: HEMATOLOGY/ONCOLOGY | Facility: HOSPITAL | Age: 59
End: 2019-02-13

## 2019-02-13 VITALS
HEIGHT: 65 IN | OXYGEN SATURATION: 98 % | HEART RATE: 93 BPM | SYSTOLIC BLOOD PRESSURE: 121 MMHG | BODY MASS INDEX: 27.09 KG/M2 | TEMPERATURE: 97 F | WEIGHT: 162.56 LBS | RESPIRATION RATE: 16 BRPM | DIASTOLIC BLOOD PRESSURE: 66 MMHG

## 2019-02-13 DIAGNOSIS — M60.9 MYOSITIS, UNSPECIFIED MYOSITIS TYPE, UNSPECIFIED SITE: Primary | ICD-10-CM

## 2019-02-13 LAB
ABO + RH BLD: NORMAL
ALBUMIN SERPL BCP-MCNC: 2.6 G/DL
ALP SERPL-CCNC: 72 U/L
ALT SERPL W/O P-5'-P-CCNC: 42 U/L
ANION GAP SERPL CALC-SCNC: 6 MMOL/L
ANTI-PM/SCL AB: <20 UNITS
ANTI-SS-A 52 KD AB, IGG: 23 UNITS
AST SERPL-CCNC: 67 U/L
BASOPHILS # BLD AUTO: 0 K/UL
BASOPHILS NFR BLD: 0 %
BILIRUB SERPL-MCNC: 0.3 MG/DL
BLD GP AB SCN CELLS X3 SERPL QL: NORMAL
BLD PROD TYP BPU: NORMAL
BLOOD UNIT EXPIRATION DATE: NORMAL
BLOOD UNIT TYPE CODE: 5100
BLOOD UNIT TYPE: NORMAL
BUN SERPL-MCNC: 19 MG/DL
CALCIUM SERPL-MCNC: 8.2 MG/DL
CHLORIDE SERPL-SCNC: 103 MMOL/L
CO2 SERPL-SCNC: 27 MMOL/L
CODING SYSTEM: NORMAL
CREAT SERPL-MCNC: 0.5 MG/DL
DIFFERENTIAL METHOD: ABNORMAL
DISPENSE STATUS: NORMAL
EJ AB SER QL: NEGATIVE
ENA JO1 AB SER IA-ACNC: <20 UNITS
ENA SM+RNP AB SER IA-ACNC: <20 UNITS
EOSINOPHIL # BLD AUTO: 0 K/UL
EOSINOPHIL NFR BLD: 0 %
ERYTHROCYTE [DISTWIDTH] IN BLOOD BY AUTOMATED COUNT: 20.7 %
EST. GFR  (AFRICAN AMERICAN): >60 ML/MIN/1.73 M^2
EST. GFR  (NON AFRICAN AMERICAN): >60 ML/MIN/1.73 M^2
FIBRILLARIN (U3 RNP): NEGATIVE
GLUCOSE SERPL-MCNC: 103 MG/DL
HCT VFR BLD AUTO: 23.8 %
HGB BLD-MCNC: 7.1 G/DL
IMM GRANULOCYTES # BLD AUTO: 0.02 K/UL
IMM GRANULOCYTES NFR BLD AUTO: 0.5 %
KU AB SER QL: NEGATIVE
LYMPHOCYTES # BLD AUTO: 0.5 K/UL
LYMPHOCYTES NFR BLD: 14.4 %
MCH RBC QN AUTO: 27.3 PG
MCHC RBC AUTO-ENTMCNC: 29.8 G/DL
MCV RBC AUTO: 92 FL
MDA-5 (P140): <20 UNITS
MI2 AB SER QL: NEGATIVE
MONOCYTES # BLD AUTO: 0.2 K/UL
MONOCYTES NFR BLD: 6 %
NEUTROPHILS # BLD AUTO: 2.9 K/UL
NEUTROPHILS NFR BLD: 79.1 %
NRBC BLD-RTO: 1 /100 WBC
NUM UNITS TRANS PACKED RBC: NORMAL
NXP-2 (P140): <20 UNITS
OJ AB SER QL: NEGATIVE
PL12 AB SER QL: NEGATIVE
PL7 AB SER QL: NEGATIVE
PLATELET # BLD AUTO: 149 K/UL
PMV BLD AUTO: 9.8 FL
POTASSIUM SERPL-SCNC: 3.9 MMOL/L
PROT SERPL-MCNC: 5.9 G/DL
RBC # BLD AUTO: 2.6 M/UL
SODIUM SERPL-SCNC: 136 MMOL/L
SRP AB SERPL QL: NEGATIVE
TIF1 GAMMA (P155/140): 25 UNITS
U2 SNRNP: NEGATIVE
WBC # BLD AUTO: 3.69 K/UL

## 2019-02-13 PROCEDURE — 36430 TRANSFUSION BLD/BLD COMPNT: CPT

## 2019-02-13 PROCEDURE — 85025 COMPLETE CBC W/AUTO DIFF WBC: CPT

## 2019-02-13 PROCEDURE — 80053 COMPREHEN METABOLIC PANEL: CPT

## 2019-02-13 PROCEDURE — 25000003 PHARM REV CODE 250: Performed by: STUDENT IN AN ORGANIZED HEALTH CARE EDUCATION/TRAINING PROGRAM

## 2019-02-13 PROCEDURE — 99233 SBSQ HOSP IP/OBS HIGH 50: CPT | Mod: ,,, | Performed by: INTERNAL MEDICINE

## 2019-02-13 PROCEDURE — 63600175 PHARM REV CODE 636 W HCPCS: Performed by: INTERNAL MEDICINE

## 2019-02-13 PROCEDURE — P9016 RBC LEUKOCYTES REDUCED: HCPCS

## 2019-02-13 PROCEDURE — 86850 RBC ANTIBODY SCREEN: CPT

## 2019-02-13 PROCEDURE — 63600175 PHARM REV CODE 636 W HCPCS: Performed by: STUDENT IN AN ORGANIZED HEALTH CARE EDUCATION/TRAINING PROGRAM

## 2019-02-13 PROCEDURE — 86920 COMPATIBILITY TEST SPIN: CPT

## 2019-02-13 PROCEDURE — 99233 PR SUBSEQUENT HOSPITAL CARE,LEVL III: ICD-10-PCS | Mod: ,,, | Performed by: INTERNAL MEDICINE

## 2019-02-13 PROCEDURE — G8979 MOBILITY GOAL STATUS: HCPCS | Mod: CI

## 2019-02-13 PROCEDURE — G8980 MOBILITY D/C STATUS: HCPCS | Mod: CI

## 2019-02-13 RX ORDER — HYDROCODONE BITARTRATE AND ACETAMINOPHEN 500; 5 MG/1; MG/1
TABLET ORAL
Status: DISCONTINUED | OUTPATIENT
Start: 2019-02-13 | End: 2019-02-13 | Stop reason: HOSPADM

## 2019-02-13 RX ORDER — ACETAMINOPHEN 325 MG/1
650 TABLET ORAL
Status: CANCELLED | OUTPATIENT
Start: 2019-02-13

## 2019-02-13 RX ORDER — METHOTREXATE 25 MG/ML
20 INJECTION, SOLUTION INTRA-ARTERIAL; INTRAMUSCULAR; INTRAVENOUS
Status: CANCELLED
Start: 2019-02-19

## 2019-02-13 RX ORDER — FAMOTIDINE 10 MG/ML
20 INJECTION INTRAVENOUS
Status: CANCELLED | OUTPATIENT
Start: 2019-02-13

## 2019-02-13 RX ORDER — ESCITALOPRAM OXALATE 10 MG/1
10 TABLET ORAL DAILY
Qty: 30 TABLET | Refills: 0
Start: 2019-02-13 | End: 2019-03-25

## 2019-02-13 RX ORDER — METHYLPREDNISOLONE 16 MG/1
48 TABLET ORAL DAILY
Qty: 30 TABLET | Refills: 0 | Status: SHIPPED | OUTPATIENT
Start: 2019-02-14 | End: 2019-02-24

## 2019-02-13 RX ORDER — METHOTREXATE 2.5 MG/1
20 TABLET ORAL
Qty: 32 TABLET | Refills: 0 | Status: SHIPPED | OUTPATIENT
Start: 2019-02-13 | End: 2019-02-28

## 2019-02-13 RX ORDER — SODIUM CHLORIDE 0.9 % (FLUSH) 0.9 %
10 SYRINGE (ML) INJECTION
Status: CANCELLED | OUTPATIENT
Start: 2019-02-13

## 2019-02-13 RX ORDER — AMLODIPINE BESYLATE 10 MG/1
10 TABLET ORAL DAILY
Qty: 30 TABLET | Refills: 0
Start: 2019-02-13 | End: 2019-02-28

## 2019-02-13 RX ORDER — HEPARIN 100 UNIT/ML
500 SYRINGE INTRAVENOUS
Status: CANCELLED | OUTPATIENT
Start: 2019-02-13

## 2019-02-13 RX ORDER — FOLIC ACID 1 MG/1
1 TABLET ORAL DAILY
Qty: 30 TABLET | Refills: 0
Start: 2019-02-13 | End: 2019-02-28

## 2019-02-13 RX ORDER — FLUTICASONE PROPIONATE 50 MCG
SPRAY, SUSPENSION (ML) NASAL
Qty: 16 ML | Refills: 5
Start: 2019-02-13 | End: 2019-06-18

## 2019-02-13 RX ORDER — FAMOTIDINE 20 MG/1
20 TABLET, FILM COATED ORAL 2 TIMES DAILY
Qty: 60 TABLET | Refills: 0
Start: 2019-02-13 | End: 2019-03-25

## 2019-02-13 RX ADMIN — GUAIFENESIN 200 MG: 100 SOLUTION ORAL at 10:02

## 2019-02-13 RX ADMIN — FAMOTIDINE 20 MG: 20 TABLET ORAL at 08:02

## 2019-02-13 RX ADMIN — ESCITALOPRAM OXALATE 10 MG: 5 TABLET, FILM COATED ORAL at 08:02

## 2019-02-13 RX ADMIN — ENOXAPARIN SODIUM 40 MG: 100 INJECTION SUBCUTANEOUS at 04:02

## 2019-02-13 RX ADMIN — GUAIFENESIN 200 MG: 100 SOLUTION ORAL at 02:02

## 2019-02-13 RX ADMIN — METHYLPREDNISOLONE 48 MG: 4 TABLET ORAL at 08:02

## 2019-02-13 RX ADMIN — GUAIFENESIN 200 MG: 100 SOLUTION ORAL at 05:02

## 2019-02-13 RX ADMIN — FOLIC ACID 1 MG: 1 TABLET ORAL at 08:02

## 2019-02-13 NOTE — ASSESSMENT & PLAN NOTE
Hb 7.0 (2/10/2019) has been downtrending since Hb 10.3 on admit 1/23  - Iron studies normal Fe with increased ferritin, RDW increased, MCV normal 89  - No signs of melena or hematochezia, no vomits. BUN 24 Cr 0.5, but on high protein tube feeds  - Likely anemia of chronic disease component in setting of malignancy and myositis  - Hgb continues to remain stable ~7.   - Pt endorses feeling weak

## 2019-02-13 NOTE — TELEPHONE ENCOUNTER
Call made to [employer] to inform of [facility] hospital discharge and admit into inpatient rehab. Was able to relay some information for their files. Will be able to call back once have more information about patients length of stay at rehab.       ----- Message from Flavia Barillas RN sent at 2/13/2019 11:21 AM CST -----  Pt may be discharging in a few days, not sure if she will be seen in rehab or will just return to clinic for a follow-up. Will reach out to [employer] at that time for updates on patient.     Message   Received: Today   Message Contents   Audrey RODRÍGUEZ Staff  Caller: [employer] (Today,  9:52 AM)         Rep is calling in regards to obtaining information about the pts discharge if available and if she was referred to a treatment center. Please contact at [phone]

## 2019-02-13 NOTE — ASSESSMENT & PLAN NOTE
- DDX dermatomyositis de haylie vs related to immunotherapy  - trend CPK daily; overall downtrending generally with a slight bump on 01/27  -restarted fluids 01/26 due to NPO  - labs suspicious for myosits; rheum on board.   - F/u 1/24 skin biopsy.  -MRI left humerus suspicious for myositis    Per rheumatology  - Methylpred 125mg IV bid started, now tapered to 24mg IV bid, transitioned to PO medrol 48mg daily via PEG.   - Continue Methylpred 48mg PO daily until follow up in Rheum clinic  - Completed IVIg 400 mg/kg daily x 5 days. Will need IVIG 1 g/kg on 2/28/19.    - MTX to 20 mg sc weekly (Tuesdays) with daily folic acid.  will need prescription upon discharge and will also need safety labs repeated 4 weeks from now for MTX monitoring.  - myomarker panel - pending  - HMGCR - negative  - PFTs as OP  - Quantitative IgA 533, IgG 932, IgM 66  - ID consulted for fast track vacc  - f/u with Dr. Swift and Dr. Campos in Rheumatology at discharge  - DISPO: pending d/c to rehab    Lab Results   Component Value Date     02/10/2019     (H) 02/09/2019     (H) 02/08/2019     (H) 02/07/2019     (H) 02/06/2019

## 2019-02-13 NOTE — PT/OT/SLP PROGRESS
"Physical Therapy Treatment  DISCHARGE NOTE    Patient Name:  Ermelinda Verde   MRN:  [id number]    Recommendations:     Discharge Recommendations:  rehabilitation facility   Discharge Equipment Recommendations: walker, rolling   Barriers to discharge: Decreased caregiver support    Assessment:     Ermelinda Verde is a 58 y.o. female admitted with a medical diagnosis of Myositis.  She presents with the following impairments/functional limitations:  weakness, impaired endurance, decreased lower extremity function, decreased upper extremity function, decreased safety awareness, impaired cardiopulmonary response to activity, impaired self care skills.    Pt is to be discharged at this time to a rehab facility.      Recent Surgery: Procedure(s) (LRB):  INSERTION, PEG TUBE (N/A) 6 Days Post-Op    Plan:     During this hospitalization, patient to be seen 3 x/week to address the identified rehab impairments via gait training, therapeutic activities, therapeutic exercises, neuromuscular re-education and progress toward the following goals:    · Plan of Care Expires:  02/22/19    Subjective     Chief Complaint: Lack of independence  Patient/Family Comments/goals: To strengthen UEs  Pain/Comfort:  · Pain Rating 1: 0/10      Objective:     Communicated with nursing prior to session.  Patient found all lines intact and call button in reach PEG Tube(port)  upon PT entry to room.     "I want to do for myself."    General Precautions: Standard, aspiration, NPO, fall   Orthopedic Precautions:N/A   Braces: N/A     Functional Mobility:  · Bed Mobility:     · Scooting: independence  · Supine to Sit: independence    · Transfers:     · Sit to Stand:  independence with no AD  · Bed to Chair: independence with  no AD  using  Stand Pivot  · Toilet Transfer: independence with  no AD  using  Stand Pivot    · Gait: Pt amb 14' x 2, with toileting bn trials, I, pushing IV pole, SOB noted amb short distances    · Balance: I: dynamic standing " balance with IV pole for support      AM-PAC 6 CLICK MOBILITY  Turning over in bed (including adjusting bedclothes, sheets and blankets)?: 4  Sitting down on and standing up from a chair with arms (e.g., wheelchair, bedside commode, etc.): 4  Moving from lying on back to sitting on the side of the bed?: 4  Moving to and from a bed to a chair (including a wheelchair)?: 4  Need to walk in hospital room?: 4  Climbing 3-5 steps with a railing?: 3  Basic Mobility Total Score: 23       Therapeutic Activities and Exercises:   Whiteboard updated  Ankle pumps: 20 reps, in sit   SLR: 10 reps x 2 sets, each LE perf individually, in sit  Hip abd/add: 10 reps x 2 sets, each LE perf individually, in sit  Hip flex: 20 reps each LE perf individually, in sit  Sit-ups: 20 reps, in reclined sitting posture  Chair push ups: 8 reps, with demo for technique  10x sit<>stand: 1 min 19.53 sec, 7/10 RPE following  Toileting with maxA: pt A by standing during bowel management and hygiene    Patient left up in chair with all lines intact and call button in reach.    GOALS:   Multidisciplinary Problems     Physical Therapy Goals        Problem: Physical Therapy Goal    Goal Priority Disciplines Outcome Goal Variances Interventions   Physical Therapy Goal     PT, PT/OT Ongoing (interventions implemented as appropriate)     Description:  Goals to be met by: 19     Patient will increase functional independence with mobility by performin. Gait  x 500 feet with Supervision without device. - GOAL MET  2. Standing lower extremity exercise program x 15 reps per handout, with supervision. - GOAL MET    3. Increased functional strength to 5/5 for BLE.  4. Pt to perf 6MWT: amb 580', to demonstrate a clinically significant improvement in aerobic capacity.    5. Pt to perf 10x sit<>stand: 2 min 45 sec, to demonstrate improvements in B LE mm strength. - GOAL MET                           Time Tracking:     PT Received On: 19  PT Start Time:  1122     PT Stop Time: 1202  PT Total Time (min): 40 min     Billable Minutes: Therapeutic Activity 8 and Therapeutic Exercise 29    Treatment Type: Treatment  PT/PTA: PT           Lotus Camacho, PT  02/13/2019

## 2019-02-13 NOTE — DISCHARGE SUMMARY
[facility]  Hematology/Oncology  Discharge Summary      Patient Name: Ermelinda Verde  MRN: [id number]  Admission Date: 1/22/2019  Hospital Length of Stay: 22 days   Discharge Date and Time:  02/13/2019 3:28 PM  Attending Physician: Ashwin Stewart MD   Discharging Provider: Nuno Prasad MD  Primary Care Provider: Reta Weeks MD    HPI: Ms Verde is a 57 yo F with a prior diagnosis of L sided breast cancer, s/p 3 cycles of nab-paclitaxel and atezolizumab who presents from clinic with a roughly week long, multi-day history of proximal muscle weakness in the shoulder girdle muscles, bilateral and associated with mild tenderness. She reports generalized weakness, but strength impairment with the use of her upper extremities more than lower extremities, and her ADLs are intact. Her last PET scan in December 2018 showed almost complete resolution of her adenopathy, and she had been complaining of a rash, which had been attributed to Nab paclitaxel. Last week, 1/15, she had a CT neck/ since there was concern for facial swelling per symptoms, and the CT showed lymphadenopathy without airway or vascular compromise. 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 Most recently, the patient had been on 60mg of prednisone with a taper and had noted swelling, proximal weakness. She also notes some progressive swallowing difficulty, but attributes this to candidal thrush for which she takes nystatin scheduled. She reports poor PO intake preceding admission, dark, mario colored urine, but denies fevers or joint pain.      Onc History per Dr. Reyna:   She developed a palpable abnormality in her left breast in January 2017 which she noted on self-examination.  A diagnostic mammogram on January 19 showed a greater than 1 cm  nodule in the upper outer portion of left breast.  By ultrasound this was lobulated and hypoechoic measuring 1.75 x 1.51 x 1.96 cm.     On January 24, 2017 a core needle biopsy was performed which showed infiltrating ductal carcinoma, high grade.  The tumor was ER negative, VA negative, and HER-2 negative.  A follow-up ultrasound on December 6 showed 2.5 x 2.2 x 1.5 cm left breast mass.  There was no abnormality noted in the left axilla.     She underwent sentinel lymph node biopsy on February 22.  That showed 4 negative lymph nodes.     She had 4 cycles of  Wilbur-adjuvantTaxotere and Cytoxan completed  on 5/9/17.     On June 26 she underwent left mastectomy.  That revealed 2 foci of invasive high-grade carcinoma measuring 14 mm and 1.5 mm.  Margins were negative.     She completed 4 cycles of adjuvant Adriamycin on September 12, 2017.     In October, she developed some left supraclavicular lymphadenopathy which turned out to be recurrence.     A fine-needle aspirate of the lymph node was performed on October 19th.  That showed metastatic carcinoma consistent with breast primary which was ER negative, VA negative and HER 2-negative.           Procedure(s) (LRB):  INSERTION, PEG TUBE (N/A)     Hospital Course: -- Patient presented as a direct admit from clinic with proximal upper extremity muscle weakness suspicious for myositis due to immunotherapy.   -- She was started on high dose steroids and IVF  -- Rheumatology consulted and started patient on Methylprednisolone  mg BID.   -- Derm consulted for skin punch biopsy, the results showed VACUOLAR INTERFACE DERMATITIS, CONSISTENT WITH DERMATOMYOSITIS..   -- She was having dysphagia possibly 2/2 to candidal esophagitis and was started on fluconazole with some improvement. Underwent modified barium study which was concerning for high risk of aspiration and the pt was made strict NPO and ultimately required a PEG tube placement on 02/07/2019 and tolerated well with  tube feeds at goal.  -- Her CPK continued to trend downwards and her proximal muscle weakness improved. (Can reverse PEG when swallowing study is passable).  -- GI consulted, EGD resulted w/ Grade C erosive esophagitis, no candidal infxn identified. Bx obtained (Very mild, nonspecific chronic inflammation without eosinophils exhibiting slight elongation of vascular papillae. No viral inclusions, multinucleate cells, or other findings suggestive of viral cytopathic effect are identified. No erosions, ulcerations, or inflammatory exudates are identified) GI recs to start PPI bid  -- On 1/29 pt started on MTX 15mg subQ weekly, and IVIG 400mg/kg x5 days per rheumatology recs  -- Pt will continue Methylpred 48mg po daily until follow up in Rheum clinic with Dr. Swift on 02.15.2019  -- PT/OT recommending IP rehab, and pt is HDS and medically clear for discharge at this time. She will be discharged to rehab facility to receive oral Methotrexate and will follow up in Rheum clinic on 2/15. The tentative plan will be for pt to achieve rehab goals by approximately 2/27 so that around 2/28 she will be able to follow up outpatient to receive infusion of IVIG.     Consults:   Consults (From admission, onward)        Status Ordering Provider     Inpatient consult to Dermatology  Once     Provider:  (Not yet assigned)    Completed YUE MART     Inpatient consult to Gastroenterology  Once     Provider:  (Not yet assigned)    Completed TOMAS JIMENEZ     Inpatient consult to Infectious Diseases  Once     Provider:  (Not yet assigned)    Completed TOMAS JIMENEZ     Inpatient consult to Physical Medicine Rehab  Once     Provider:  (Not yet assigned)    Completed SINGH LAMAR     Inpatient consult to Physical Medicine Rehab  Once     Provider:  (Not yet assigned)    Completed SINGH LAMAR     Inpatient consult to Registered Dietitian/Nutritionist  Once     Provider:  (Not yet assigned)    Completed DIDIER STARR  SCARLETT     Inpatient consult to Registered Dietitian/Nutritionist  Once     Provider:  (Not yet assigned)    Completed DIDIER STARR     Inpatient consult to Rheumatology  Once     Provider:  (Not yet assigned)    Completed DALLIN CHUNG          Significant Diagnostic Studies: Labs:   BMP:   Recent Labs   Lab 02/13/19  0321         K 3.9      CO2 27   BUN 19   CREATININE 0.5   CALCIUM 8.2*   , CMP   Recent Labs   Lab 02/13/19  0321      K 3.9      CO2 27      BUN 19   CREATININE 0.5   CALCIUM 8.2*   PROT 5.9*   ALBUMIN 2.6*   BILITOT 0.3   ALKPHOS 72   AST 67*   ALT 42   ANIONGAP 6*   ESTGFRAFRICA >60.0   EGFRNONAA >60.0    and CBC   Recent Labs   Lab 02/13/19  0321   WBC 3.69*   HGB 7.1*   HCT 23.8*   *       Pending Diagnostic Studies:     None        Final Active Diagnoses:    Diagnosis Date Noted POA    PRINCIPAL PROBLEM:  Myositis [M60.9] 01/22/2019 Yes    Alteration in skin integrity [R23.9] 02/11/2019 Yes    Anemia [D64.9] 02/10/2019 No    Impaired functional mobility and endurance [Z74.09] 02/08/2019 Unknown    Moderate malnutrition [E44.0] 02/05/2019 Yes    Congestion of upper airway [J98.8] 02/01/2019 Yes    Dermatomyositis [M33.90] 01/29/2019 Yes    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, 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]  Yes    Hypokalemia [E87.6] 01/27/2019 Yes    Dysphagia [R13.10] 01/26/2019 No    Swelling of upper arm [M79.89] 01/25/2019 Yes    Dermatitis [L30.9] 01/24/2019 Yes    Rhabdomyolysis [M62.82] 01/22/2019 Yes    Candidiasis [B37.9] 01/22/2019 Yes    Drug rash [L27.0] 01/08/2019 Yes    Pre-diabetes [R73.03] 03/19/2018 Yes    Adjustment disorder with depressed mood [F43.21] 03/11/2018 Yes    Vitamin B12 deficiency [E53.8] 03/08/2018 Yes    Breast cancer of upper-outer quadrant of left female breast [C50.412] 02/09/2017 Yes     Chronic    Hypertension [I10] 02/06/2017 Yes      Problems Resolved During this Admission:     Diagnosis Date Noted Date Resolved POA    Hyponatremia [E87.1] 01/22/2019 01/24/2019 Yes      Discharged Condition: stable    Disposition: Rehab Facility    Follow Up:  Follow-up Information     Alex Reyna MD.    Specialties:  Hematology and Oncology, Hematology  Why:  As scheduled by the clinic  Contact information:  [id number]  [place] [address]  [phone]                 Patient Instructions:   No discharge procedures on file.  Medications:  Reconciled Home Medications:      Medication List      START taking these medications    amLODIPine 10 MG tablet  Commonly known as:  NORVASC  1 tablet (10 mg total) by Per G Tube route once daily.     folic acid 1 MG tablet  Commonly known as:  FOLVITE  1 tablet (1 mg total) by Per G Tube route once daily.     methotrexate 2.5 MG Tab  Crush 8 tablets (20 mg total) and take per PEG tube every 7 days.     methylPREDNISolone 16 MG Tab  Commonly known as:  MEDROL  3 tablets (48 mg total) by Per G Tube route once daily. for 10 days  Start taking on:  2/14/2019        CHANGE how you take these medications    escitalopram oxalate 10 MG tablet  Commonly known as:  LEXAPRO  1 tablet (10 mg total) by Per G Tube route once daily.  What changed:  how to take this     famotidine 20 MG tablet  Commonly known as:  PEPCID  1 tablet (20 mg total) by Per G Tube route 2 (two) times daily.  What changed:    · medication strength  · how much to take  · how to take this  · when to take this        CONTINUE taking these medications    fluticasone 50 mcg/actuation nasal spray  Commonly known as:  FLONASE  SHAKE LIQUID AND USE 1 SPRAY(50 MCG) IN EACH NOSTRIL EVERY DAY        STOP taking these medications    ALPRAZolam 0.25 MG tablet  Commonly known as:  XANAX     amlodipine-benazepril 10-40 mg per capsule  Commonly known as:  LOTREL     CALCIUM 500 ORAL     cetirizine 10 MG tablet  Commonly known as:  ZYRTEC     nystatin 100,000 unit/mL suspension  Commonly known as:  MYCOSTATIN      predniSONE 20 MG tablet  Commonly known as:  DELTASONE     VITAMIN B-12 500 MCG tablet  Generic drug:  cyanocobalamin     vitamin D 1000 units Tab  Commonly known as:  VITAMIN D3            Nuno Prasad MD  Hematology/Oncology  [facility]

## 2019-02-13 NOTE — PLAN OF CARE
Problem: Adult Inpatient Plan of Care  Goal: Plan of Care Review  Outcome: Ongoing (interventions implemented as appropriate)  Patient remains free from falls and injury this shift. Bed in low, locked position with call bell in reach. Patient encouraged to call for assistance when getting out of bed. Patient verbalized understanding. All belongings within reach. VS stable. Afebrile. No complaints throughout shift. PEG tube in place- tube feeds at 65 ml/hr tolerating well. Planned d/c to [facility] today. Will continue to monitor.

## 2019-02-13 NOTE — PLAN OF CARE
"0945- MDRs completed with Dr. Obregon and the team. Patient is medically ready to discharge today after blood transfusion. Dr. Obregon and the team explained to patient that the discharge delay is secondary to scheduling patient's MTX and IVIG appts with Rheumatology clinic. Rheumatology can not schedule the appts in the clinic for MTX and IVIG administration before they receive [employer]BS approval (for the future medications). Dr. Obregon and the team explained all of this to the patient. Dr. Obregon explained that if insurance approval (for future medications) delays transfer to rehab by several weeks patient may progress to discharge home with home health. Patient appeared upset with this update. Dr. Obregon and the team reassured the patient that everything was being done to get the patient to rehab today. Patient verbalized understanding.    1030-  received a call from the  (Yessenia). DOLLY updated Yessenia on above information. DOLLY notified  that MTX was changed from subQ to oral and patient will now be transferring to rehab with oral MTX as her "home med" with administration instructions detailed in facility transfer orders (per MD). IVIG approval and scheduling still pending.  to call Tomsa at [facility] to discuss.    4941-  informed CM that rehab is willing to accept patient today if patient transfers to their facility with her oral MTX prescription and has her facility transfer orders state that patient will receive her IVIG after her rehab stay (on the 28th or later). CM to update pharmacist and MD.    DOLLY called the pharmacist Roberta with above information. Roberta to have the speciality pharmacy bedside deliver the oral MTX to the patient now.    DOLLY called Dr. Mcguire with above information. Dr. Mcguire to write facility transfer orders stating patient can take her "home med" oral MTX and outline administration/dose details, as well as state that patient will not receive IVIG until the 28th or later (after rehab " stay).    CM updated SW Yessenia who is walking to patient's room now to discuss above and inform her of her pending discharge today.    Once orders are placed by MD and oral MTX is delivered to patient's bedside the SW will set up transportation. [facility] aware.    Nicole Carter, RN, BSN, CM  [facility]  Nurse - Med Onc/Gyn Onc  [phone]

## 2019-02-13 NOTE — PROGRESS NOTES
11:27 am SW visit bedside pt quite upset after receiving information that she would not be able to go to rehab today.   Discharge planning discussed and reassured patient that she would be transferring to rehab facility today.      Pt approved to transfer to [facility] today.  Pt has MTX po medication bedside and pt will take this medication with her to rehab.  Pt knows to give medication to rehab pharm when she arrives.  Facility transfer orders entered into Epic.  W/C transport arranged through [facility] with requested  at 4 pm which will allow time for pt to complete ordered blood products that are currently being infused.   S/w Kathy pts assigned nurse today ([id number]) to discuss d/c plan and she agreed to call report to [phone].  Discussed discharge planning for transfer to [facility] with pt and questions answered to her satisfaction.  Business card including contact information for the SWer provided.  Pt agreed to contact SWer if questions/concerns.  Updated Rehab liaison Tomas.  All parties in agreement with discharge plan.  SWer remains available.

## 2019-02-13 NOTE — PLAN OF CARE
Plans for patient to discharge to [facility] today.  to set up transportation and notify the bedside nurse when to call report once appropriate.     Future Appointments   Date Time Provider Department Center   2/15/2019  2:00 PM Clarissa Swift MD [facility]      02/13/19 [id number]   Final Note   Assessment Type Final Discharge Note   Anticipated Discharge Disposition Rehab  ([facility])   What phone number can be called within the next 1-3 days to see how you are doing after discharge? ([phone])   Hospital Follow Up  Appt(s) scheduled? Yes   Discharge plans and expectations educations in teach back method with documentation complete? Yes  (per staff)

## 2019-02-13 NOTE — PLAN OF CARE
[facility]    FACILITY TRANSFER ORDERS      Patient Name: Ermelinda Verde   YOB: 1960    PCP: Reta Weeks MD   PCP Address: [address] [place] [address] / [place] [address]  PCP Phone Number: [phone]  PCP Fax: [phone]    Encounter Date: 02/13/2019    Admit to: [facility]    Vital Signs:  Routine    Diagnoses:   Active Hospital Problems    Diagnosis  POA    *Myositis [M60.9]  Yes    Alteration in skin integrity [R23.9]  Yes    Anemia [D64.9]  No    Impaired functional mobility and endurance [Z74.09]  Unknown    Moderate malnutrition [E44.0]  Yes    Congestion of upper airway [J98.8]  Yes    Dermatomyositis [M33.90]  Yes    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, M35.9]  Yes    Hypokalemia [E87.6]  Yes    Dysphagia [R13.10]  No    Swelling of upper arm [M79.89]  Yes    Dermatitis [L30.9]  Yes    Rhabdomyolysis [M62.82]  Yes    Candidiasis [B37.9]  Yes    Drug rash [L27.0]  Yes    Pre-diabetes [R73.03]  Yes    Adjustment disorder with depressed mood [F43.21]  Yes    Vitamin B12 deficiency [E53.8]  Yes    Breast cancer of upper-outer quadrant of left female breast [C50.412]  Yes     Chronic    Hypertension [I10]  Yes      Resolved Hospital Problems    Diagnosis Date Resolved POA    Hyponatremia [E87.1] 01/24/2019 Yes       Allergies:Review of patient's allergies indicates:  No Known Allergies    Diet: regular diet    Activities: Activity as tolerated    Nursing:     Labs: CBC, CMP and CK 2x/ week     CONSULTS:    Physical Therapy to evaluate and treat. , Occupational Therapy to evaluate and treat., Speech Therapy to evaluate and treat for Swallowing. and  to evaluate for community resources/long-range planning.    MISCELLANEOUS CARE:  PEG Care: Clean site every 24 hours.     WOUND CARE ORDERS  Upon assessment of Left Buttocks: Partial thickness wound complicated by shearing noted to left buttock. Pink granulating tissue with islands of  epithelial tissue with minimal slough and scant serous drainage noted.      To Right Buttocks: Partial thickness wound complicated by shearing noted to right buttock. Pink granulating tissue with islands of epithelial tissue with minimal slough and scant serous drainage noted. (see photos and assessment below)     Recommendations:  -Nursing to cleanse Sacrum with cleansing wipes and apply TRIAD ointment BID and prn cleaning. Triad ointment provides a hydrophilic environment to promote healing of exposed tissue and prevents breakdown of intact skin. Left triad ointment at bedside.  -Pressure injury prevention interventions to include repositioning with wedge pillow, chair cushion, and waffle cushion overlay. Instructed pt to take waffle overlay and chair cushion home with her upon discharge.        Medications: Review discharge medications with patient and family and provide education.      Current Discharge Medication List      START taking these medications    Details   amLODIPine (NORVASC) 10 MG tablet 1 tablet (10 mg total) by Per G Tube route once daily.  Qty: 30 tablet, Refills: 0    Associated Diagnoses: Malignant neoplasm of upper-outer quadrant of left breast in female, estrogen receptor negative      folic acid (FOLVITE) 1 MG tablet 1 tablet (1 mg total) by Per G Tube route once daily.  Qty: 30 tablet, Refills: 0    Associated Diagnoses: Malignant neoplasm of upper-outer quadrant of left breast in female, estrogen receptor negative      methotrexate 2.5 MG Tab Crush 8 tablets (20 mg total) and take per PEG tube every 7 days.  Qty: 32 tablet, Refills: 0    Associated Diagnoses: Dermatomyositis      methylPREDNISolone (MEDROL) 16 MG Tab 3 tablets (48 mg total) by Per G Tube route once daily. for 10 days  Qty: 30 tablet, Refills: 0    Associated Diagnoses: Dermatomyositis; Breast cancer of upper-outer quadrant of left female breast         CONTINUE these medications which have CHANGED    Details   escitalopram  oxalate (LEXAPRO) 10 MG tablet 1 tablet (10 mg total) by Per G Tube route once daily.  Qty: 30 tablet, Refills: 0    Associated Diagnoses: Malignant neoplasm of upper-outer quadrant of left breast in female, estrogen receptor negative      famotidine (PEPCID) 20 MG tablet 1 tablet (20 mg total) by Per G Tube route 2 (two) times daily.  Qty: 60 tablet, Refills: 0    Associated Diagnoses: Malignant neoplasm of upper-outer quadrant of left breast in female, estrogen receptor negative      fluticasone (FLONASE) 50 mcg/actuation nasal spray SHAKE LIQUID AND USE 1 SPRAY(50 MCG) IN EACH NOSTRIL EVERY DAY  Qty: 16 mL, Refills: 5    Associated Diagnoses: Tinnitus of both ears         STOP taking these medications       ALPRAZolam (XANAX) 0.25 MG tablet Comments:   Reason for Stopping:         amlodipine-benazepril (LOTREL) 10-40 mg per capsule Comments:   Reason for Stopping:         CALCIUM CARBONATE (CALCIUM 500 ORAL) Comments:   Reason for Stopping:         cyanocobalamin (VITAMIN B-12) 500 MCG tablet Comments:   Reason for Stopping:         nystatin (MYCOSTATIN) 100,000 unit/mL suspension Comments:   Reason for Stopping:         vitamin D (VITAMIN D3) 1000 units Tab Comments:   Reason for Stopping:         cetirizine (ZYRTEC) 10 MG tablet Comments:   Reason for Stopping:         predniSONE (DELTASONE) 20 MG tablet Comments:   Reason for Stopping:                Please crush individual Methorexate oral tabs and place in PEG tube  Also, patient will have setup for IVIG to occur after 1/28    _________________________________  Chau Mcguire MD  02/13/2019

## 2019-02-13 NOTE — PLAN OF CARE
Problem: Physical Therapy Goal  Goal: Physical Therapy Goal  Goals to be met by: 19     Patient will increase functional independence with mobility by performin. Gait  x 500 feet with Supervision without device. - GOAL MET  2. Standing lower extremity exercise program x 15 reps per handout, with supervision. - GOAL MET    3. Increased functional strength to 5/5 for BLE.  4. Pt to perf 6MWT: amb 580', to demonstrate a clinically significant improvement in aerobic capacity.    5. Pt to perf 10x sit<>stand: 2 min 45 sec, to demonstrate improvements in B LE mm strength. - GOAL MET         Outcome: Ongoing (interventions implemented as appropriate)  Pt achieved 3 goals at this time.

## 2019-02-13 NOTE — SUBJECTIVE & OBJECTIVE
Interval History: NAEON, strength improving. tolerating tube feeds well at goal rate of 65cc/hr. Dispo pending insurance authorization for rehab placement.    Oncology Treatment Plan:   OP BREAST DOCETAXEL Q3W    Medications:  Continuous Infusions:    Scheduled Meds:   amLODIPine  10 mg Per G Tube Daily    enoxaparin  40 mg Subcutaneous Daily    escitalopram oxalate  10 mg Per G Tube Daily    famotidine  20 mg Per G Tube BID    folic acid  1 mg Per G Tube Daily    guaifenesin 100 mg/5 ml  200 mg Per G Tube Q4H    methylPREDNISolone  48 mg Per G Tube Daily     PRN Meds:acetaminophen, ALPRAZolam, dextrose 50%, dextrose 50%, glucagon (human recombinant), glucose, glucose, hepatitis B, hydrALAZINE, insulin aspart U-100, morphine, ondansetron, pneumoc 13-brandin conj-dip cr(PF), polyethylene glycol, promethazine, sodium chloride, sodium chloride 0.9%     Review of Systems   Constitutional: Negative for activity change, appetite change, chills, diaphoresis, fatigue, fever and unexpected weight change.   HENT: Positive for trouble swallowing. Negative for congestion, ear discharge, facial swelling, hearing loss, postnasal drip, sinus pressure, sneezing, sore throat and tinnitus.    Eyes: Negative for photophobia, pain and redness.   Respiratory: Negative for apnea, cough, choking, chest tightness, shortness of breath and stridor.    Cardiovascular: Negative for chest pain, palpitations and leg swelling.   Gastrointestinal: Negative for abdominal pain, anal bleeding, constipation, diarrhea, nausea and rectal pain.   Endocrine: Negative for polyuria.   Genitourinary: Negative for dysuria and hematuria.   Musculoskeletal: Negative for arthralgias, back pain, gait problem, joint swelling, myalgias, neck pain and neck stiffness.   Skin: Positive for rash (improving). Negative for color change and pallor.   Allergic/Immunologic: Negative for immunocompromised state.   Neurological: Positive for weakness. Negative for  dizziness, seizures and numbness.   Hematological: Positive for adenopathy. Does not bruise/bleed easily.   Psychiatric/Behavioral: Negative for agitation and behavioral problems.     Objective:     Vital Signs (Most Recent):  Temp: 98.2 °F (36.8 °C) (02/13/19 0305)  Pulse: 90 (02/13/19 0305)  Resp: 16 (02/13/19 0305)  BP: 113/61 (02/13/19 0305)  SpO2: 100 % (02/13/19 0305) Vital Signs (24h Range):  Temp:  [97.5 °F (36.4 °C)-98.7 °F (37.1 °C)] 98.2 °F (36.8 °C)  Pulse:  [87-97] 90  Resp:  [16-18] 16  SpO2:  [96 %-100 %] 100 %  BP: (103-125)/(55-67) 113/61     Weight: 73.7 kg (162 lb 7.7 oz)  Body mass index is 27.04 kg/m².  Body surface area is 1.84 meters squared.      Intake/Output Summary (Last 24 hours) at 2/13/2019 0617  Last data filed at 2/13/2019 0535  Gross per 24 hour   Intake 1346 ml   Output 1500 ml   Net -154 ml       Physical Exam   Constitutional: She is oriented to person, place, and time. She appears well-developed and well-nourished. No distress.   HENT:   Head: Atraumatic.   Nose: Nose normal.   Mouth/Throat: No oropharyngeal exudate.   Eyes: Conjunctivae and EOM are normal. Pupils are equal, round, and reactive to light. Right eye exhibits no discharge. Left eye exhibits no discharge. No scleral icterus.   Neck: Normal range of motion. Neck supple. No tracheal deviation present.   Cardiovascular: Normal rate, regular rhythm and intact distal pulses. Exam reveals no gallop and no friction rub.   Pulmonary/Chest: Effort normal and breath sounds normal. No respiratory distress. She has no wheezes. She has no rales. She exhibits no tenderness.   Abdominal: Soft. Bowel sounds are normal. She exhibits no distension and no mass. There is no tenderness. There is no rebound and no guarding.   PEG tube in situ   Musculoskeletal: Normal range of motion. She exhibits no tenderness or deformity.   Neurological: She is alert and oriented to person, place, and time. No cranial nerve deficit or sensory deficit.      4.5/5 shoulder strength on shoulder abduction; improving  5/5 flexion and extension preserved at elbow   Skin: Skin is warm and dry. Capillary refill takes less than 2 seconds. No rash noted. She is not diaphoretic. No erythema. No pallor.   Psychiatric: She has a normal mood and affect.   Vitals reviewed.      Significant Labs:   BMP:   Recent Labs   Lab 02/13/19 0321         K 3.9      CO2 27   BUN 19   CREATININE 0.5   CALCIUM 8.2*   , CBC:   Recent Labs   Lab 02/13/19 0321   WBC 3.69*   HGB 7.1*   HCT 23.8*   *   , CMP:   Recent Labs   Lab 02/13/19 0321      K 3.9      CO2 27      BUN 19   CREATININE 0.5   CALCIUM 8.2*   PROT 5.9*   ALBUMIN 2.6*   BILITOT 0.3   ALKPHOS 72   AST 67*   ALT 42   ANIONGAP 6*   EGFRNONAA >60.0   , Coagulation: No results for input(s): PT, INR, APTT in the last 48 hours., Haptoglobin: No results for input(s): HAPTOGLOBIN in the last 48 hours., Immunology: No results for input(s): SPEP, ERVIN, DARCY, FREELAMBDALI in the last 48 hours., LDH: No results for input(s): LDHCSF, BFSOURCE in the last 48 hours., LFTs:   Recent Labs   Lab 02/13/19 0321   ALT 42   AST 67*   ALKPHOS 72   BILITOT 0.3   PROT 5.9*   ALBUMIN 2.6*   , Reticulocytes:   No results for input(s): RETIC in the last 48 hours., Tumor Markers: No results for input(s): PSA, CEA, , AFPTM, BQ4105,  in the last 48 hours.    Invalid input(s): ALGTM, Uric Acid No results for input(s): URICACID in the last 48 hours., Urine Studies: No results for input(s): COLORU, APPEARANCEUA, PHUR, SPECGRAV, PROTEINUA, GLUCUA, KETONESU, BILIRUBINUA, OCCULTUA, NITRITE, UROBILINOGEN, LEUKOCYTESUR, RBCUA, WBCUA, BACTERIA, SQUAMEPITHEL, HYALINECASTS in the last 48 hours.    Invalid input(s): WRIGHTSUR and All pertinent labs from the last 24 hours have been reviewed.    Diagnostic Results:  EXAMINATION:  FL MODIFIED BARIUM SWALLOW SPEECH STUDY    CLINICAL HISTORY:  repeat to determine diet  initiation.;    TECHNIQUE:  Modified Barium Swallow Study. Video fluoroscopic swallowing examination was performed in conjunction with the speech pathology department.  Various liquid and solid food substances were used to assess swallowing.    Fluoroscopy Time: 1.9 minutes    COMPARISON:  Fluoroscopic modified barium swallow 01/28/2019      Impression       Transit: Delayed oral transit time. Significant vallecular and pyriform recess residual/layering.    Penetration/Aspiration: Aspiration following the consumption of liquids.  Significant stasis of puree food with high risk of aspiration.    Other: N/A.    See speech pathology report for further details.    Electronically signed by resident: Latrell Garcia MD  Date: 02/06/2019  Time: 15:43    Electronically signed by: Shay Reddy MD  Date: 02/06/2019

## 2019-02-13 NOTE — PLAN OF CARE
[facility]      FACILITY TRANSFER ORDERS        Patient Name: Ermelinda Vedre   YOB: 1960     PCP: Reta Weeks MD   PCP Address: [address] [place] [address] / [place] [address]  PCP Phone Number: [phone]  PCP Fax: [phone]     Encounter Date: 02/13/2019     Admit to: [facility]     Vital Signs:  Routine     Diagnoses:          Active Hospital Problems     Diagnosis   POA    *Myositis [M60.9]   Yes    Alteration in skin integrity [R23.9]   Yes    Anemia [D64.9]   No    Impaired functional mobility and endurance [Z74.09]   Unknown    Moderate malnutrition [E44.0]   Yes    Congestion of upper airway [J98.8]   Yes    Dermatomyositis [M33.90]   Yes    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, M35.9]   Yes    Hypokalemia [E87.6]   Yes    Dysphagia [R13.10]   No    Swelling of upper arm [M79.89]   Yes    Dermatitis [L30.9]   Yes    Rhabdomyolysis [M62.82]   Yes    Candidiasis [B37.9]   Yes    Drug rash [L27.0]   Yes    Pre-diabetes [R73.03]   Yes    Adjustment disorder with depressed mood [F43.21]   Yes    Vitamin B12 deficiency [E53.8]   Yes    Breast cancer of upper-outer quadrant of left female breast [C50.412]   Yes       Chronic    Hypertension [I10]   Yes       Resolved Hospital Problems     Diagnosis Date Resolved POA    Hyponatremia [E87.1] 01/24/2019 Yes         Allergies:Review of patient's allergies indicates:  No Known Allergies     Diet: regular diet     Activities: Activity as tolerated     Nursing:      Labs: CBC, CMP and CK 2x/ week      CONSULTS:    Physical Therapy to evaluate and treat. , Occupational Therapy to evaluate and treat., Speech Therapy to evaluate and treat for Swallowing. and  to evaluate for community resources/long-range planning.     MISCELLANEOUS CARE:  PEG Care: Clean site every 24 hours.      WOUND CARE ORDERS  Upon assessment of Left Buttocks: Partial thickness wound complicated by shearing noted to left  buttock. Pink granulating tissue with islands of epithelial tissue with minimal slough and scant serous drainage noted.      To Right Buttocks: Partial thickness wound complicated by shearing noted to right buttock. Pink granulating tissue with islands of epithelial tissue with minimal slough and scant serous drainage noted. (see photos and assessment below)     Recommendations:  -Nursing to cleanse Sacrum with cleansing wipes and apply TRIAD ointment BID and prn cleaning. Triad ointment provides a hydrophilic environment to promote healing of exposed tissue and prevents breakdown of intact skin. Left triad ointment at bedside.  -Pressure injury prevention interventions to include repositioning with wedge pillow, chair cushion, and waffle cushion overlay. Instructed pt to take waffle overlay and chair cushion home with her upon discharge.           Medications: Review discharge medications with patient and family and provide education.             Current Discharge Medication List             START taking these medications     Details   amLODIPine (NORVASC) 10 MG tablet 1 tablet (10 mg total) by Per G Tube route once daily.  Qty: 30 tablet, Refills: 0     Associated Diagnoses: Malignant neoplasm of upper-outer quadrant of left breast in female, estrogen receptor negative       folic acid (FOLVITE) 1 MG tablet 1 tablet (1 mg total) by Per G Tube route once daily.  Qty: 30 tablet, Refills: 0     Associated Diagnoses: Malignant neoplasm of upper-outer quadrant of left breast in female, estrogen receptor negative       methotrexate 2.5 MG Tab Crush 8 tablets (20 mg total) and take per PEG tube every 7 days.  Qty: 32 tablet, Refills: 0     Associated Diagnoses: Dermatomyositis       methylPREDNISolone (MEDROL) 16 MG Tab 3 tablets (48 mg total) by Per G Tube route once daily. for 10 days  Qty: 30 tablet, Refills: 0     Associated Diagnoses: Dermatomyositis; Breast cancer of upper-outer quadrant of left female breast                  CONTINUE these medications which have CHANGED     Details   escitalopram oxalate (LEXAPRO) 10 MG tablet 1 tablet (10 mg total) by Per G Tube route once daily.  Qty: 30 tablet, Refills: 0     Associated Diagnoses: Malignant neoplasm of upper-outer quadrant of left breast in female, estrogen receptor negative       famotidine (PEPCID) 20 MG tablet 1 tablet (20 mg total) by Per G Tube route 2 (two) times daily.  Qty: 60 tablet, Refills: 0     Associated Diagnoses: Malignant neoplasm of upper-outer quadrant of left breast in female, estrogen receptor negative       fluticasone (FLONASE) 50 mcg/actuation nasal spray SHAKE LIQUID AND USE 1 SPRAY(50 MCG) IN EACH NOSTRIL EVERY DAY  Qty: 16 mL, Refills: 5     Associated Diagnoses: Tinnitus of both ears                STOP taking these medications         ALPRAZolam (XANAX) 0.25 MG tablet Comments:   Reason for Stopping:            amlodipine-benazepril (LOTREL) 10-40 mg per capsule Comments:   Reason for Stopping:            CALCIUM CARBONATE (CALCIUM 500 ORAL) Comments:   Reason for Stopping:            cyanocobalamin (VITAMIN B-12) 500 MCG tablet Comments:   Reason for Stopping:            nystatin (MYCOSTATIN) 100,000 unit/mL suspension Comments:   Reason for Stopping:            vitamin D (VITAMIN D3) 1000 units Tab Comments:   Reason for Stopping:            cetirizine (ZYRTEC) 10 MG tablet Comments:   Reason for Stopping:            predniSONE (DELTASONE) 20 MG tablet Comments:   Reason for Stopping:                    Please crush individual Methorexate oral tabs and place in PEG tube  Also, patient will have setup for IVIG to occur after 2/28; this date was marked wrong on previous facility transfer order     _________________________________  Chau Mcguire MD  02/13/2019

## 2019-02-13 NOTE — PROGRESS NOTES
[facility]  Hematology/Oncology  Progress Note    Patient Name: Ermelinda Verde  Admission Date: 1/22/2019  Hospital Length of Stay: 22 days  Code Status: Full Code     Subjective:     HPI:  Ms Verde is a 59 yo F with a prior diagnosis of L sided breast cancer, s/p 3 cycles of nab-paclitaxel and atezolizumab who presents from clinic with a roughly week long, multi-day history of proximal muscle weakness in the shoulder girdle muscles, bilateral and associated with mild tenderness. She reports generalized weakness, but strength impairment with the use of her upper extremities more than lower extremities, and her ADLs are intact. Her last PET scan in December 2018 showed almost complete resolution of her adenopathy, and she had been complaining of a rash, which had been attributed to Nab paclitaxel. Last week, 1/15, she had a CT neck/ since there was concern for facial swelling per symptoms, and the CT showed lymphadenopathy without airway or vascular compromise. 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 Most recently, the patient had been on 60mg of prednisone with a taper and had noted swelling, proximal weakness. She also notes some progressive swallowing difficulty, but attributes this to candidal thrush for which she takes nystatin scheduled. She reports poor PO intake preceding admission, dark, mario colored urine, but denies fevers or joint pain.      Onc History per Dr. Reyna:   She developed a palpable abnormality in her left breast in January 2017 which she noted on self-examination.  A diagnostic mammogram on January 19 showed a greater than 1 cm nodule in the upper outer portion of left breast.  By ultrasound this was lobulated and hypoechoic measuring 1.75 x 1.51 x 1.96 cm.     On January 24,  2017 a core needle biopsy was performed which showed infiltrating ductal carcinoma, high grade.  The tumor was ER negative, MN negative, and HER-2 negative.  A follow-up ultrasound on December 6 showed 2.5 x 2.2 x 1.5 cm left breast mass.  There was no abnormality noted in the left axilla.     She underwent sentinel lymph node biopsy on February 22.  That showed 4 negative lymph nodes.     She had 4 cycles of  Wilbur-adjuvantTaxotere and Cytoxan completed  on 5/9/17.     On June 26 she underwent left mastectomy.  That revealed 2 foci of invasive high-grade carcinoma measuring 14 mm and 1.5 mm.  Margins were negative.     She completed 4 cycles of adjuvant Adriamycin on September 12, 2017.     In October, she developed some left supraclavicular lymphadenopathy which turned out to be recurrence.     A fine-needle aspirate of the lymph node was performed on October 19th.  That showed metastatic carcinoma consistent with breast primary which was ER negative, MN negative and HER 2-negative.           Interval History: NAEON, strength improving. tolerating tube feeds well at goal rate of 65cc/hr. Insurance authorization for rehab placement approved. Pt likely discharge today.     Oncology Treatment Plan:   OP BREAST DOCETAXEL Q3W    Medications:  Continuous Infusions:    Scheduled Meds:   amLODIPine  10 mg Per G Tube Daily    enoxaparin  40 mg Subcutaneous Daily    escitalopram oxalate  10 mg Per G Tube Daily    famotidine  20 mg Per G Tube BID    folic acid  1 mg Per G Tube Daily    guaifenesin 100 mg/5 ml  200 mg Per G Tube Q4H    methylPREDNISolone  48 mg Per G Tube Daily     PRN Meds:acetaminophen, ALPRAZolam, dextrose 50%, dextrose 50%, glucagon (human recombinant), glucose, glucose, hepatitis B, hydrALAZINE, insulin aspart U-100, morphine, ondansetron, pneumoc 13-brandin conj-dip cr(PF), polyethylene glycol, promethazine, sodium chloride, sodium chloride 0.9%     Review of Systems   Constitutional: Negative for  activity change, appetite change, chills, diaphoresis, fatigue, fever and unexpected weight change.   HENT: Positive for trouble swallowing. Negative for congestion, ear discharge, facial swelling, hearing loss, postnasal drip, sinus pressure, sneezing, sore throat and tinnitus.    Eyes: Negative for photophobia, pain and redness.   Respiratory: Negative for apnea, cough, choking, chest tightness, shortness of breath and stridor.    Cardiovascular: Negative for chest pain, palpitations and leg swelling.   Gastrointestinal: Negative for abdominal pain, anal bleeding, constipation, diarrhea, nausea and rectal pain.   Endocrine: Negative for polyuria.   Genitourinary: Negative for dysuria and hematuria.   Musculoskeletal: Negative for arthralgias, back pain, gait problem, joint swelling, myalgias, neck pain and neck stiffness.   Skin: Positive for rash (improving). Negative for color change and pallor.   Allergic/Immunologic: Negative for immunocompromised state.   Neurological: Positive for weakness. Negative for dizziness, seizures and numbness.   Hematological: Positive for adenopathy. Does not bruise/bleed easily.   Psychiatric/Behavioral: Negative for agitation and behavioral problems.     Objective:     Vital Signs (Most Recent):  Temp: 98.2 °F (36.8 °C) (02/13/19 0305)  Pulse: 90 (02/13/19 0305)  Resp: 16 (02/13/19 0305)  BP: 113/61 (02/13/19 0305)  SpO2: 100 % (02/13/19 0305) Vital Signs (24h Range):  Temp:  [97.5 °F (36.4 °C)-98.7 °F (37.1 °C)] 98.2 °F (36.8 °C)  Pulse:  [87-97] 90  Resp:  [16-18] 16  SpO2:  [96 %-100 %] 100 %  BP: (103-125)/(55-67) 113/61     Weight: 73.7 kg (162 lb 7.7 oz)  Body mass index is 27.04 kg/m².  Body surface area is 1.84 meters squared.      Intake/Output Summary (Last 24 hours) at 2/13/2019 0617  Last data filed at 2/13/2019 0535  Gross per 24 hour   Intake 1346 ml   Output 1500 ml   Net -154 ml       Physical Exam   Constitutional: She is oriented to person, place, and time. She  appears well-developed and well-nourished. No distress.   HENT:   Head: Atraumatic.   Nose: Nose normal.   Mouth/Throat: No oropharyngeal exudate.   Eyes: Conjunctivae and EOM are normal. Pupils are equal, round, and reactive to light. Right eye exhibits no discharge. Left eye exhibits no discharge. No scleral icterus.   Neck: Normal range of motion. Neck supple. No tracheal deviation present.   Cardiovascular: Normal rate, regular rhythm and intact distal pulses. Exam reveals no gallop and no friction rub.   Pulmonary/Chest: Effort normal and breath sounds normal. No respiratory distress. She has no wheezes. She has no rales. She exhibits no tenderness.   Abdominal: Soft. Bowel sounds are normal. She exhibits no distension and no mass. There is no tenderness. There is no rebound and no guarding.   PEG tube in situ   Musculoskeletal: Normal range of motion. She exhibits no tenderness or deformity.   Neurological: She is alert and oriented to person, place, and time. No cranial nerve deficit or sensory deficit.     4.5/5 shoulder strength on shoulder abduction; improving  5/5 flexion and extension preserved at elbow   Skin: Skin is warm and dry. Capillary refill takes less than 2 seconds. No rash noted. She is not diaphoretic. No erythema. No pallor.   Psychiatric: She has a normal mood and affect.   Vitals reviewed.      Significant Labs:   BMP:   Recent Labs   Lab 02/13/19 0321         K 3.9      CO2 27   BUN 19   CREATININE 0.5   CALCIUM 8.2*   , CBC:   Recent Labs   Lab 02/13/19 0321   WBC 3.69*   HGB 7.1*   HCT 23.8*   *   , CMP:   Recent Labs   Lab 02/13/19 0321      K 3.9      CO2 27      BUN 19   CREATININE 0.5   CALCIUM 8.2*   PROT 5.9*   ALBUMIN 2.6*   BILITOT 0.3   ALKPHOS 72   AST 67*   ALT 42   ANIONGAP 6*   EGFRNONAA >60.0   , Coagulation: No results for input(s): PT, INR, APTT in the last 48 hours., Haptoglobin: No results for input(s): HAPTOGLOBIN in the  last 48 hours., Immunology: No results for input(s): SPEP, ERVIN, DARCY, FREELAMBDALI in the last 48 hours., LDH: No results for input(s): LDHCSF, BFSOURCE in the last 48 hours., LFTs:   Recent Labs   Lab 02/13/19  0321   ALT 42   AST 67*   ALKPHOS 72   BILITOT 0.3   PROT 5.9*   ALBUMIN 2.6*   , Reticulocytes:   No results for input(s): RETIC in the last 48 hours., Tumor Markers: No results for input(s): PSA, CEA, , AFPTM, [id number],  in the last 48 hours.    Invalid input(s): ALGTM, Uric Acid No results for input(s): URICACID in the last 48 hours., Urine Studies: No results for input(s): COLORU, APPEARANCEUA, PHUR, SPECGRAV, PROTEINUA, GLUCUA, KETONESU, BILIRUBINUA, OCCULTUA, NITRITE, UROBILINOGEN, LEUKOCYTESUR, RBCUA, WBCUA, BACTERIA, SQUAMEPITHEL, HYALINECASTS in the last 48 hours.    Invalid input(s): WRIGHTSUR and All pertinent labs from the last 24 hours have been reviewed.    Diagnostic Results:  EXAMINATION:  FL MODIFIED BARIUM SWALLOW SPEECH STUDY    CLINICAL HISTORY:  repeat to determine diet initiation.;    TECHNIQUE:  Modified Barium Swallow Study. Video fluoroscopic swallowing examination was performed in conjunction with the speech pathology department.  Various liquid and solid food substances were used to assess swallowing.    Fluoroscopy Time: 1.9 minutes    COMPARISON:  Fluoroscopic modified barium swallow 01/28/2019      Impression       Transit: Delayed oral transit time. Significant vallecular and pyriform recess residual/layering.    Penetration/Aspiration: Aspiration following the consumption of liquids.  Significant stasis of puree food with high risk of aspiration.    Other: N/A.    See speech pathology report for further details.    Electronically signed by resident: Latrell Garcia MD  Date: 02/06/2019  Time: 15:43    Electronically signed by: Shay Reddy MD  Date: 02/06/2019         Assessment/Plan:     * Myositis    - DDX dermatomyositis de haylie vs related to immunotherapy  - trend  CPK daily; overall downtrending generally with a slight bump on 01/27  -restarted fluids 01/26 due to NPO  - labs suspicious for myosits; rheum on board.   - F/u 1/24 skin biopsy.  -MRI left humerus suspicious for myositis    Per rheumatology  - Methylpred 125mg IV bid started, now tapered to 24mg IV bid, transitioned to PO medrol 48mg daily via PEG.   - Continue Methylpred 48mg PO daily until follow up in Rheum clinic  - Completed IVIg 400 mg/kg daily x 5 days. Will need IVIG 1 g/kg on 2/28/19.    - MTX to 20 mg sc weekly (Tuesdays) with daily folic acid.  will need prescription upon discharge and will also need safety labs repeated 4 weeks from now for MTX monitoring.  - myomarker panel - pending  - HMGCR - negative  - PFTs as OP  - Quantitative IgA 533, IgG 932, IgM 66  - ID consulted for fast track vacc  - f/u with Dr. Swift and Dr. Campos in Rheumatology at discharge  - DISPO: pending d/c to rehab    Lab Results   Component Value Date     02/10/2019     (H) 02/09/2019     (H) 02/08/2019     (H) 02/07/2019     (H) 02/06/2019        Anemia    Hb 7.0 (2/10/2019) has been downtrending since Hb 10.3 on admit 1/23  - Iron studies normal Fe with increased ferritin, RDW increased, MCV normal 89  - No signs of melena or hematochezia, no vomits. BUN 24 Cr 0.5, but on high protein tube feeds  - Likely anemia of chronic disease component in setting of malignancy and myositis  - Hgb continues to remain stable ~7.   - Pt endorses feeling weak     Hypokalemia    Replete prn     Dysphagia    Patient is experiencing difficulty swallowing that has been increasing in severity over last couple of days to the point where patient did not feel as if she could swallow.  As of 01/26, SLP evaluated patient and determined that she should be NPO except for medications until a modified barium swallow could be performed. Possibly candidal esophagitis; patient has oral thrush.    Plan:  - High aspiration  risk on modified barium study, strict NPO  - NGT in situ, tube feeds initiated per dietary consult  - Switched to IV meds where possible  - Fluconazole IVPB 200mg q24hr (now d/c'd)  - GI consulted, EGD resulted w/ Grade C erosive esophagitis, no candidal infxn identified. Bx obtained, GI recs start PPI bid  - Failed swallow study after NGT removed  - PEG completed 2/7  - Dietary/nutrition consult for tube feeds and education  - Tube feedings started 2/8, tolerating well. Continue to titrate  - Medications per PEG tube  - Can reverse PEG when swallowing is passable  - SLP rehab services  -- Tube feeds at goal (65 cc/hr); tolerating well     Swelling of upper arm    -B/L U/S negative for DVTs  -appears less edematous than day prior  -continue to monitor     Dermatitis    - Skin biopsy from 1/24 pending, appreciate dermatology recs     Candidiasis    Oral thrush  -cont Nystatin swish and swallow. Additionally, added fluconaozle 200 mg Po qday on 01/26 as possibility patient is experiencing esophagitis 2/2 to candida  -appears resolved now s/p fluconazole and nystatin     Rhabdomyolysis    -see myositis. Continue supportive care, Cr/ electrolyte/ CPK monitoring and aggressive hydration. transaminitis on CMP is likely due to skeletal muscle rhabdo, not hepatic     Drug rash    - Unclear if symptoms related to chemotherapy     Pre-diabetes    -check a1c, 0-5u SSI while on steroids, add prandial insulin as warranted.      Adjustment disorder with depressed mood    -cont home SSRI     Vitamin B12 deficiency    -cont home B12 supplementation      Breast cancer of upper-outer quadrant of left female breast    -On January 24, 2017 a core needle biopsy was performed which showed infiltrating ductal carcinoma, high grade.  The tumor was ER negative, IN negative, and HER-2 negative.  -She had 4 cycles of  Wilbur-adjuvantTaxotere and Cytoxan completed  on 5/9/17.  -On June 26 she underwent left mastectomy.  That revealed 2 foci of  invasive high-grade carcinoma measuring 14 mm and 1.5 mm.  Margins were negative.  -She completed 4 cycles of adjuvant Adriamycin on September 12, 2017.  -  A fine-needle aspirate of the lymph node was performed on October 19th.  That showed metastatic carcinoma consistent with breast primary which was ER negative, HI negative and HER 2-negative.    -Per rheumatology: hold paclitaxel agent and atezolizumb at this time - both can cause myositis      Hypertension    -on home amlodipine-benazepril; will hold ACEi for now and observe renal function with CPK elevation in rhabdo.     Plan:  - Amlodipine 10 mg qday on admission  - 1/28 Pt now strict NPO due to failed swallow study, now on hydralazine 10mg IV q8h WCTM and adjust PRN              Nuno Prasad MD  Hematology/Oncology  [facility]        ATTENDING NOTE, ONCOLOGY INPATIENT TEAM    As above; events of last 24 hours noted.  Patient seen and examined, chart reviewed.  Appears comfortable, in NAD.  Lungs are clear to auscultation.  Abdomen is soft, nontender.  Labs reviewed.    PLAN  Follow CBC daily.  Transfuse one unit PRBCs today.  Awaiting transfer to the Rehabilitation unit. If we are unable to do that, we may consider discharging home in a few days.  Continue enoxaparin.  Continue tube feedings.  Continue steroids.  We will follow.    Ashwin Stewart MD

## 2019-02-13 NOTE — PROGRESS NOTES
Report given to Pawel CURTIS at [facility]. Patient has prescriptions to bring with her to SNF. Still awaiting for transportation. Family at the bedside. WCTM.

## 2019-02-15 ENCOUNTER — OFFICE VISIT (OUTPATIENT)
Dept: RHEUMATOLOGY | Facility: CLINIC | Age: 59
End: 2019-02-15
Payer: COMMERCIAL

## 2019-02-15 VITALS
SYSTOLIC BLOOD PRESSURE: 119 MMHG | HEIGHT: 65 IN | BODY MASS INDEX: 27.66 KG/M2 | HEART RATE: 100 BPM | WEIGHT: 166 LBS | DIASTOLIC BLOOD PRESSURE: 75 MMHG

## 2019-02-15 DIAGNOSIS — Z79.899 ENCOUNTER FOR LONG-TERM CURRENT USE OF HIGH RISK MEDICATION: ICD-10-CM

## 2019-02-15 DIAGNOSIS — D84.821 IMMUNOSUPPRESSION DUE TO DRUG THERAPY: ICD-10-CM

## 2019-02-15 DIAGNOSIS — R13.19 ESOPHAGEAL DYSPHAGIA: ICD-10-CM

## 2019-02-15 DIAGNOSIS — Z79.899 IMMUNOSUPPRESSION DUE TO DRUG THERAPY: ICD-10-CM

## 2019-02-15 DIAGNOSIS — Z17.1 MALIGNANT NEOPLASM OF UPPER-OUTER QUADRANT OF LEFT BREAST IN FEMALE, ESTROGEN RECEPTOR NEGATIVE: Chronic | ICD-10-CM

## 2019-02-15 DIAGNOSIS — C50.412 MALIGNANT NEOPLASM OF UPPER-OUTER QUADRANT OF LEFT BREAST IN FEMALE, ESTROGEN RECEPTOR NEGATIVE: Chronic | ICD-10-CM

## 2019-02-15 DIAGNOSIS — M33.13 DERMATOMYOSITIS: Primary | ICD-10-CM

## 2019-02-15 PROCEDURE — 3074F PR MOST RECENT SYSTOLIC BLOOD PRESSURE < 130 MM HG: ICD-10-PCS | Mod: CPTII,S$GLB,, | Performed by: STUDENT IN AN ORGANIZED HEALTH CARE EDUCATION/TRAINING PROGRAM

## 2019-02-15 PROCEDURE — 3074F SYST BP LT 130 MM HG: CPT | Mod: CPTII,S$GLB,, | Performed by: STUDENT IN AN ORGANIZED HEALTH CARE EDUCATION/TRAINING PROGRAM

## 2019-02-15 PROCEDURE — 99999 PR PBB SHADOW E&M-EST. PATIENT-LVL III: ICD-10-PCS | Mod: PBBFAC,,, | Performed by: STUDENT IN AN ORGANIZED HEALTH CARE EDUCATION/TRAINING PROGRAM

## 2019-02-15 PROCEDURE — 3078F PR MOST RECENT DIASTOLIC BLOOD PRESSURE < 80 MM HG: ICD-10-PCS | Mod: CPTII,S$GLB,, | Performed by: STUDENT IN AN ORGANIZED HEALTH CARE EDUCATION/TRAINING PROGRAM

## 2019-02-15 PROCEDURE — 3078F DIAST BP <80 MM HG: CPT | Mod: CPTII,S$GLB,, | Performed by: STUDENT IN AN ORGANIZED HEALTH CARE EDUCATION/TRAINING PROGRAM

## 2019-02-15 PROCEDURE — 99215 PR OFFICE/OUTPT VISIT, EST, LEVL V, 40-54 MIN: ICD-10-PCS | Mod: S$GLB,,, | Performed by: STUDENT IN AN ORGANIZED HEALTH CARE EDUCATION/TRAINING PROGRAM

## 2019-02-15 PROCEDURE — 99215 OFFICE O/P EST HI 40 MIN: CPT | Mod: S$GLB,,, | Performed by: STUDENT IN AN ORGANIZED HEALTH CARE EDUCATION/TRAINING PROGRAM

## 2019-02-15 PROCEDURE — 99999 PR PBB SHADOW E&M-EST. PATIENT-LVL III: CPT | Mod: PBBFAC,,, | Performed by: STUDENT IN AN ORGANIZED HEALTH CARE EDUCATION/TRAINING PROGRAM

## 2019-02-15 PROCEDURE — 3008F PR BODY MASS INDEX (BMI) DOCUMENTED: ICD-10-PCS | Mod: CPTII,S$GLB,, | Performed by: STUDENT IN AN ORGANIZED HEALTH CARE EDUCATION/TRAINING PROGRAM

## 2019-02-15 PROCEDURE — 3008F BODY MASS INDEX DOCD: CPT | Mod: CPTII,S$GLB,, | Performed by: STUDENT IN AN ORGANIZED HEALTH CARE EDUCATION/TRAINING PROGRAM

## 2019-02-15 RX ORDER — SULFAMETHOXAZOLE AND TRIMETHOPRIM 800; 160 MG/1; MG/1
1 TABLET ORAL
Qty: 12 TABLET | Refills: 3 | Status: SHIPPED | OUTPATIENT
Start: 2019-02-15 | End: 2019-03-18

## 2019-02-15 ASSESSMENT — ROUTINE ASSESSMENT OF PATIENT INDEX DATA (RAPID3)
TOTAL RAPID3 SCORE: 4.39
PATIENT GLOBAL ASSESSMENT SCORE: 3
WHEN YOU AWAKENED IN THE MORNING OVER THE LAST WEEK, PLEASE INDICATE THE AMOUNT OF TIME IT TAKES UNTIL YOU ARE AS LIMBER AS YOU WILL BE FOR THE DAY: 0-30 MINUTES
AM STIFFNESS SCORE: 1, YES
FATIGUE SCORE: 8.5
PAIN SCORE: 5.5
PSYCHOLOGICAL DISTRESS SCORE: 2.2
MDHAQ FUNCTION SCORE: 1.4

## 2019-02-15 NOTE — PROGRESS NOTES
RHEUMATOLOGY CLINIC INITIAL VISIT    Chief complaints:- Myositis       HPI:-  Ermelinda Javed a 58 y.o.F with history of L sided infiltrating ductal carcinoma of the L breast (dx on Jan 2017), HTN, depression, gastritis, and recently diagnosed myositis (uncertain if it's autoimmune vs medication induced), recently discharge to rehab present to the clinic today for hospital follow up.    Patient was initially send from hem/onc clinic for evaluation of possible inflammatory myositis.  Patient was hospitalized from 1/22/19 till 2/13/19 for myositis.      L breast cancer s/p completion of 4 cycles of demi-adjuvant taxotere and cytoxan (completed on 5/9/17) and mastectomy (6/26/17) and then 4 cycles of adjuvant Adriamycin (completed 9/12/17). In 10/2017 - patient developed L supraclavicular lymphadenopathy which FNA confirmed as reoccurrence of previously treated breast cancer.      Patient started on Atelizumab/Abraxane on 11/7/18. Per chart review, patient noticed rashes on the dorsum of her hands on 11/11/18. Seen in urgent care and given topical steroid cream. Then received two more infusions on Atelizumab/Abraxane on 11/21/18 and 12/5/18. Started to notice puffiness around the eyes on 12/11/18. Received another Abraxane infusion on 12/12/18 but this time with hydrocortisone 50 mg which helped reduce the swelling around the eyes. Developed swelling of the face again on 12/15/18. Next infusion of Abraxane done on 12/19/18 with Solucortef and Atelizumab held. Abraxane given again on 1/3/19 with no IV steroid. Patient developed swelling of the face on 1/4/19 and went to urgent care and given short course of prednisone 20 mg BID. On 1/15/19, patient seen in hem/onc clinic with c/o of pressure and tightness around neck. CT scan of chest and neck did not reveal any vascular compression. Started on prednisone 60 mg with taper. On 1/22/18 patient with c/o proximal  muscle weakness. CPK found to be elevated around 4k. Patient admitted and given solumedrol 80 mg IV on 1/22/18. Last infusion of Atelizumab on 12/19/18. Last infusion of Abraxane on 1/3/19. Given Solumedrol 1g x 1 on 1/23/18. Seen by Dermatology on 1/24/18 and biopsy done of skin rash. Given distribution of rashes, there was concern for dermatomyositis. Patient started on Solumedrol 125 mg IV BID at this time.     During her hospitalization patient was started on high dose steroid Solumedrol 80mg IV x 1, 1g x 1, and then 125mg BID until tapered down to medrol 48mg.  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  Patient was started on IVIG (400mg/kg/daily x 5 day) 1/29/19-2/2.   Patient started on MTX 20mg SQ 2/5 with folic acid. MTX SQ was transitioned to PO because concern about rehab administration.  Patient failed swallow eval and PEG tube was placed.        Denies any family history of autoimmune diseases.     No smoking, EtOH, recreational drug usage.     Denies any photosensitivity, joint swelling, unintentional weigh loss, abdominal pain, night sweats, CP, SOB.  +oral thrush.        Interval History    Patient was discharged two days ago and now in [facility] rehab.  Doing well.  She is still weak in bilateral shoulder and hip area.  Able to ambulate without assistance (but is still a fall precaution).  Tolerating diet via PEG tube.      Denies any new rashes (previous rash had improved), alopecia, arthralgia, abdominal pain, CP, or SOB (improved from hospitalization), N/V, fever/chills.       Review of Systems   Constitutional: Negative for chills, diaphoresis, fever, malaise/fatigue and weight loss.   HENT: Negative for congestion, ear discharge, ear pain, hearing loss, nosebleeds, sinus pain and tinnitus.    Eyes: Negative for photophobia, pain, discharge and redness.   Respiratory: Negative for cough, hemoptysis, sputum production, shortness of breath, wheezing and stridor.    Cardiovascular:  Negative for chest pain, palpitations, orthopnea, claudication, leg swelling and PND.   Gastrointestinal: Negative for abdominal pain, constipation, diarrhea, heartburn, nausea and vomiting.   Genitourinary: Negative for dysuria, frequency, hematuria and urgency.   Musculoskeletal: Positive for myalgias. Negative for back pain, joint pain and neck pain.   Skin: Negative for rash.   Neurological: Positive for weakness. Negative for dizziness, tingling, tremors and headaches.   Endo/Heme/Allergies: Does not bruise/bleed easily.   Psychiatric/Behavioral: Negative for depression, hallucinations and suicidal ideas. The patient is not nervous/anxious and does not have insomnia.        Past Medical History:   Diagnosis Date    Breast cancer 2017    left    Depression     Diverticulosis     Gastritis     Hypertension     Vitamin B12 deficiency 3/8/2018       Past Surgical History:   Procedure Laterality Date    BIOPSY-SENTINEL NODE Left 2017    Performed by [facility] at [facility]    BREAST BIOPSY Left     BREAST RECONSTRUCTION Left 2017     SECTION      COLONOSCOPY  2011    repeat in 10 yrs.    D&C      EGD (ESOPHAGOGASTRODUODENOSCOPY) N/A 2019    Performed by Pernell Rosas [facility] at [facility] ENDO (2ND FLR)    INSERTION, PEG TUBE N/A 2019    Performed by Zurdo Gordon MD at [facility] ENDO (2ND FLR)    ISEHSPNFY-WMQR-I-CATH Right 10/31/2018    Performed by Deo Palencia MD at [facility] OR 2ND FLR    UMPNJJHVO-XDDB-U-CATH Right 2017    Performed by [facility] at [facility] OR    VOKNMBGGC-HJNO-V-CATH-neck or chest Fluoro needed Consent AM of surgery Right 10/30/2018    Performed by Deo Palencia MD at [facility] OR 2ND FLR    MASTECTOMY Left 2017    MASTECTOMY Left 2017    [facility] OR    MYOMECTOMY      PORTACATH PLACEMENT      RECONSTRUCTION-BREAST/FLAP-SCOTT Left 2017    Performed by [facility]jinder Sewell [facility] at [facility] OR     "SENTINEL LYMPH NODE BIOPSY  02/2017    left    TOTAL REDUCTION MAMMOPLASTY Right 2017        Social History     Tobacco Use    Smoking status: Never Smoker    Smokeless tobacco: Never Used   Substance Use Topics    Alcohol use: Yes     Comment: wine    Drug use: No       Family History   Problem Relation Age of Onset    Heart disease Father     Hypertension Father     Breast cancer Sister 52    Hypertension Mother     No Known Problems Brother     Thyroid disease Daughter         hyperthyroidism s/p thyroidectomy    No Known Problems Son     No Known Problems Brother     No Known Problems Brother     No Known Problems Sister     No Known Problems Daughter     Colon cancer Neg Hx     Ovarian cancer Neg Hx        Review of patient's allergies indicates:  No Known Allergies        Physical examination:-    Vitals:    02/15/19 1423   BP: 119/75   Pulse: 100   Weight: 75.3 kg (166 lb)   Height: 5' 5" (1.651 m)   PainSc:   1       Physical Exam   Constitutional: She is oriented to person, place, and time and well-developed, well-nourished, and in no distress.   HENT:   Head: Normocephalic and atraumatic.   Eyes: Conjunctivae are normal. Pupils are equal, round, and reactive to light.   Neck: Normal range of motion. Neck supple.   Cardiovascular: Normal rate, regular rhythm and normal heart sounds.   Pulmonary/Chest: Effort normal and breath sounds normal.   Abdominal: Soft. Bowel sounds are normal.   Musculoskeletal: She exhibits no edema or tenderness.   Right Side Rheumatological Exam      Muscle Strength (0-5 scale):  Neck Flexion:  4  Neck Extension: 5  Deltoid:  4  Biceps: 5/5   Triceps:  4.4  : 4.6/5   Iliopsoas: 4.0  Quadriceps:  5   Distal Lower Extremity: 5     Left Side Rheumatological Exam      Muscle Strength (0-5 scale):  Neck Flexion:  4  Neck Extension: 5  Deltoid:  4  Biceps: 5/5   Triceps:  4.4  :  4.6   Iliopsoas: 4.0  Quadriceps:  5   Distal Lower Extremity: 5     Inability to " raise shoulder passed 180 degrees bilaterally  ROM limited because of decreased strength of UE   Neurological: She is alert and oriented to person, place, and time. Gait normal.   Skin: Skin is warm and dry.   Guttron's papules on hands - improved   Nape of the neck and bilateral lower extremities (shins) rash still present - improvement from hospitalization      Psychiatric: Mood, memory, affect and judgment normal.       Labs:-  Results for ROMEO PEREZ (MRN [id number]) as of 2/15/2019 15:30   Ref. Range 2/10/2019 04:00 2/11/2019 04:00 2/13/2019 03:21 2/13/2019 08:33 2/13/2019 10:45   WBC Latest Ref Range: 3.90 - 12.70 K/uL 4.79 4.57 3.69 (L)     RBC Latest Ref Range: 4.00 - 5.40 M/uL 2.55 (L) 2.49 (L) 2.60 (L)     Hemoglobin Latest Ref Range: 12.0 - 16.0 g/dL 7.0 (L) 7.2 (L) 7.1 (L)     Hematocrit Latest Ref Range: 37.0 - 48.5 % 22.7 (L) 22.7 (L) 23.8 (L)     MCV Latest Ref Range: 82 - 98 fL 89 91 92     MCH Latest Ref Range: 27.0 - 31.0 pg 27.5 28.9 27.3     MCHC Latest Ref Range: 32.0 - 36.0 g/dL 30.8 (L) 31.7 (L) 29.8 (L)     RDW Latest Ref Range: 11.5 - 14.5 % 20.0 (H) 20.1 (H) 20.7 (H)     Platelets Latest Ref Range: 150 - 350 K/uL 152 161 149 (L)     MPV Latest Ref Range: 9.2 - 12.9 fL 10.1 9.6 9.8     Gran% Latest Ref Range: 38.0 - 73.0 % 71.6 77.7 (H) 79.1 (H)     Gran # (ANC) Latest Ref Range: 1.8 - 7.7 K/uL 3.4 3.6 2.9     Immature Granulocytes Latest Ref Range: 0.0 - 0.5 % 0.4 0.4 0.5     Immature Grans (Abs) Latest Ref Range: 0.00 - 0.04 K/uL 0.02 0.02 0.02     Lymph% Latest Ref Range: 18.0 - 48.0 % 21.3 16.0 (L) 14.4 (L)     Lymph # Latest Ref Range: 1.0 - 4.8 K/uL 1.0 0.7 (L) 0.5 (L)     Mono% Latest Ref Range: 4.0 - 15.0 % 6.3 5.7 6.0     Mono # Latest Ref Range: 0.3 - 1.0 K/uL 0.3 0.3 0.2 (L)     Eosinophil% Latest Ref Range: 0.0 - 8.0 % 0.4 0.2 0.0     Eos # Latest Ref Range: 0.0 - 0.5 K/uL 0.0 0.0 0.0     Basophil% Latest Ref Range: 0.0 - 1.9 % 0.0 0.0 0.0     Baso # Latest Ref Range: 0.00 -  0.20 K/uL 0.00 0.00 0.00     nRBC Latest Ref Range: 0 /100 WBC 0 0 1 (A)     Differential Method Unknown Automated Automated Automated     Retic Latest Ref Range: 0.5 - 2.5 % 3.0 (H)       Sodium Latest Ref Range: 136 - 145 mmol/L 136  136     Potassium Latest Ref Range: 3.5 - 5.1 mmol/L 3.7  3.9     Chloride Latest Ref Range: 95 - 110 mmol/L 104  103     CO2 Latest Ref Range: 23 - 29 mmol/L 28  27     Anion Gap Latest Ref Range: 8 - 16 mmol/L 4 (L)  6 (L)     BUN, Bld Latest Ref Range: 6 - 20 mg/dL 24 (H)  19     Creatinine Latest Ref Range: 0.5 - 1.4 mg/dL 0.5  0.5     eGFR if non African American Latest Ref Range: >60 mL/min/1.73 m^2 >60.0  >60.0     eGFR if African American Latest Ref Range: >60 mL/min/1.73 m^2 >60.0  >60.0     Glucose Latest Ref Range: 70 - 110 mg/dL 106  103     Calcium Latest Ref Range: 8.7 - 10.5 mg/dL 8.1 (L)  8.2 (L)     Phosphorus Latest Ref Range: 2.7 - 4.5 mg/dL 1.9 (L)       Magnesium Latest Ref Range: 1.6 - 2.6 mg/dL 1.8       Alkaline Phosphatase Latest Ref Range: 55 - 135 U/L 65  72     Total Protein Latest Ref Range: 6.0 - 8.4 g/dL 5.6 (L)  5.9 (L)     Albumin Latest Ref Range: 3.5 - 5.2 g/dL 2.4 (L)  2.6 (L)     Total Bilirubin Latest Ref Range: 0.1 - 1.0 mg/dL 0.3  0.3     AST Latest Ref Range: 10 - 40 U/L 63 (H)  67 (H)     ALT Latest Ref Range: 10 - 44 U/L 37  42     CPK Latest Ref Range: 20 - 180 U/L 166       Group & Rh Unknown     O POS   INDIRECT RENEE Unknown     NEG   PREPARE RBC SOFT Unknown    Rpt    UNIT NUMBER Unknown    [id number]    PRODUCT CODE Unknown    [id number]    DISPENSE STATUS Unknown    TRANSFUSED    Aldolase Latest Ref Range: 1.2 - 7.6 U/L 3.2         Results for HARKLESS, DEVERY L (MRN [id number]) as of 2/15/2019 15:30   Ref. Range 1/22/2019 22:24 1/25/2019 17:33 1/28/2019 19:31 1/29/2019 05:00 1/29/2019 12:27   DARCY HEP-2 Titer Unknown Positive 1:640 Sp...       Anti-SSA Antibody Latest Ref Range: 0.00 - 19.99 EU 0.49       Anti-SSA Interpretation Latest  Ref Range: Negative  Negative       Anti-SSB Antibody Latest Ref Range: 0.00 - 19.99 EU 0.11       Anti-SSB Interpretation Latest Ref Range: Negative  Negative       ds DNA Ab Latest Ref Range: Negative 1:10  Negative 1:10       Anti Sm Antibody Latest Ref Range: 0.00 - 19.99 EU 0.58       Anti-Sm Interpretation Latest Ref Range: Negative  Negative       Anti Sm/RNP Antibody Latest Ref Range: 0.00 - 19.99 EU 0.62       Anti-Sm/RNP Interpretation Latest Ref Range: Negative  Negative       DARCY Screen Latest Ref Range: Negative <1:160  Positive (A)       Complement (C-3) Latest Ref Range: 50 - 180 mg/dL  106      Complement (C-4) Latest Ref Range: 11 - 44 mg/dL  29      IgG - Serum Latest Ref Range: 650 - 1600 mg/dL   932     IgM Latest Ref Range: 50 - 300 mg/dL   66     IgA Latest Ref Range: 40 - 350 mg/dL   533 (H)     Anti-Meg-1 Antibody Latest Ref Range: <20 Units    <20    Anti-PM/Scl Ab Latest Ref Range: <20 Units    <20    Anti-SS-A 52 kD Ab, IgG Latest Ref Range: <20 Units    23 (H)    Anti-U1-RNP  Ab Latest Ref Range: <20 Units    <20    EJ Latest Ref Range: Negative     Negative    EJ Autoantibodies Latest Ref Range: Not Detected  Not Detected       Fibrillarin (U3 RNP) Latest Ref Range: Negative     Negative    HMGCR Antibody Latest Ref Range: 0 - 19 Units     <3   Meg-1 Autoantibodies Latest Ref Range: <1.0 Index <1.00       Ku Latest Ref Range: Negative     Negative    Ku Autoantibodies Latest Ref Range: Not Detected  Not Detected       MDA-5 (P140) (CADM-140) Latest Ref Range: <20 Units    <20    MI-2 Latest Ref Range: Negative     Negative    MI-2 Autoab Latest Ref Range: Not Detected  Not Detected       NXP-2 (P140) Latest Ref Range: <20 Units    <20    OJ Latest Ref Range: Negative     Negative    OJ Autoantibodies Latest Ref Range: Not Detected  Not Detected       PL-12 Latest Ref Range: Negative     Negative    PL-12 Autoantibodies Latest Ref Range: Not Detected  Not Detected       PL-7 Latest Ref  Range: Negative     Negative    PL-7 Autoantibodies Latest Ref Range: Not Detected  Not Detected       SRP Latest Ref Range: Negative     Negative    SRP Autoantibodies Latest Ref Range: Not Detected  Not Detected       TIF1 GAMMA (P155/140) Latest Ref Range: <20 Units    25 (H)    U2 snRNP Latest Ref Range: Negative     Negative      Radiographs:-  Independent visualization of images done.   CXR (1/28) - clear lungs  PET scan - no pulmonary/GI activity.       Medication List with Changes/Refills   New Medications    SULFAMETHOXAZOLE-TRIMETHOPRIM 800-160MG (BACTRIM DS) 800-160 MG TAB    Take 1 tablet by mouth 3 (three) times a week. Please take 1 tablet M/W/F   Current Medications    AMLODIPINE (NORVASC) 10 MG TABLET    1 tablet (10 mg total) by Per G Tube route once daily.    ESCITALOPRAM OXALATE (LEXAPRO) 10 MG TABLET    1 tablet (10 mg total) by Per G Tube route once daily.    FAMOTIDINE (PEPCID) 20 MG TABLET    1 tablet (20 mg total) by Per G Tube route 2 (two) times daily.    FLUTICASONE (FLONASE) 50 MCG/ACTUATION NASAL SPRAY    SHAKE LIQUID AND USE 1 SPRAY(50 MCG) IN EACH NOSTRIL EVERY DAY    FOLIC ACID (FOLVITE) 1 MG TABLET    1 tablet (1 mg total) by Per G Tube route once daily.    METHOTREXATE 2.5 MG TAB    Crush 8 tablets (20 mg total) and take per PEG tube every 7 days.    METHYLPREDNISOLONE (MEDROL) 16 MG TAB    3 tablets (48 mg total) by Per G Tube route once daily. for 10 days       Assessment/Plans:-   58 y.o.F with history of L sided infiltrating ductal carcinoma of the L breast (dx on Jan 2017), HTN, depression, gastritis, and recently diagnosed myositis (uncertain if it's autoimmune vs medication induced), recently discharge to rehab present to the clinic today for hospital follow up.       Patient started on Atelizumab/Abraxane on 11/7/18. Per chart review, patient noticed rashes on the dorsum of her hands on 11/11/18. Seen in urgent care and given topical steroid cream. Then received two more  infusions on Atelizumab/Abraxane on 11/21/18 and 12/5/18. Started to notice puffiness around the eyes on 12/11/18. Received another Abraxane infusion on 12/12/18 but this time with hydrocortisone 50 mg which helped reduce the swelling around the eyes. Developed swelling of the face again on 12/15/18. Next infusion of Abraxane done on 12/19/18 with Solucortef and Atelizumab held. Abraxane given again on 1/3/19 with no IV steroid. Patient developed swelling of the face on 1/4/19 and went to urgent care and given short course of prednisone 20 mg BID. On 1/15/19, patient seen in hem/onc clinic with c/o of pressure and tightness around neck. CT scan of chest and neck did not reveal any vascular compression. Started on prednisone 60 mg with taper. On 1/22/18 patient with c/o proximal muscle weakness. CPK found to be elevated around 4k. Patient admitted and given solumedrol 80 mg IV on 1/22/18. Last infusion of Atelizumab on 12/19/18. Last infusion of Abraxane on 1/3/19. Given Solumedrol 1g x 1 on 1/23/18. Seen by Dermatology on 1/24/18 and biopsy done of skin rash. Given distribution of rashes, there was concern for dermatomyositis. Patient started on Solumedrol 125 mg IV BID at that time.  Skin biopsy resulted consistent with dermatomyositis.     Labs: CPK and Aldolase normalized. +DARCY 1:640 speckled with negative profile.  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 quantTB: indeterminate, T-spot: negative     Spirometry: normal, normal diffusion capacity. FVC: 81%, DLCO: 114%     Patient exhibits signs of dermatomyositis (heliotropic rash and gottron's papules).   Dermatomyositis can be associated with malignancy.  MRI of LUE showed edema of the deltoid and biceps.  Exam with proximal muscle weakness: b/l deltoids 4/5, b/l  iliopsoas 4.4/5. 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   Given +TIF1 gamma positivity, most likely dermatomyositis is from underlying malignancy.      Failed 2nd swallow eval on 2/6/19. PEG placed 2/7/2019.     Current medications:  - medrol 48mg since 02/02/19  - IVIG 1/29/19-2/2  - MTX 20mg Q weekly (started 2/5)  - folic acid daily      Plan:  - chemotherapy can be re-started as it may help treat Dermatomyositis. Would recommend against checkpoint inhibitor, however, as it may have been a trigger for the dermatomyositis  - labs to be in 2 weeks (MTX 4 week monitoring) CBC, CMP, CPK, aldolase   - continue medrol 48mg PO daily   - completed IVIg 400 mg/kg daily x 5 days. Will need IVIG 1 g/kg on 2/28/19 - therapy plan in.  Pending insurance approval   - GI referral for colonoscopy (cancer screening).  Last colonoscopy was 2011/2012 - per patient normal and next scheduled colonoscopy was in 10 years.    - MTX to 20 mg sc weekly (Tuesdays) with daily folic acid. Safely labs ordered for 2/26  - continue rehab - muscle strengthening   - will need to have all vaccination complete after discharge from rehab- Prevnar, shingles  - f/u esophageal biopsy results  -- PFTs with lung volumes as outpatient.   - 1200 mg dietary calcium and Vitamin D3 1000 mg daily  - continue H2 blocker  - addition of Bactrim DS M/W/F for PCP prophylaxis          # RTC in 4 weeks

## 2019-02-18 ENCOUNTER — HOSPITAL ENCOUNTER (OUTPATIENT)
Dept: RADIOLOGY | Facility: HOSPITAL | Age: 59
Discharge: HOME | End: 2019-02-18
Attending: PHYSICAL MEDICINE & REHABILITATION
Payer: COMMERCIAL

## 2019-02-18 ENCOUNTER — TELEPHONE (OUTPATIENT)
Dept: RHEUMATOLOGY | Facility: CLINIC | Age: 59
End: 2019-02-18

## 2019-02-18 PROCEDURE — 93971 EXTREMITY STUDY: CPT | Mod: 26,LT,, | Performed by: RADIOLOGY

## 2019-02-18 PROCEDURE — 93971 US LOWER EXTREMITY VEINS LEFT: ICD-10-PCS | Mod: 26,LT,, | Performed by: RADIOLOGY

## 2019-02-18 NOTE — TELEPHONE ENCOUNTER
Message from Dr. Velez (Doc @ [facility]) concerning about MTX toxicity.     WBC 2.21, ANC 1.2, Hgb 8.4 (from 9), and stable platelets today (per message).      Informed Dr. Velez to hold MTX now and increase folic acid to 4mg daily if no oral ulcers.  Instruction to check LFTs and repeat CBC and CMP in 2 weeks.

## 2019-02-20 ENCOUNTER — TELEPHONE (OUTPATIENT)
Dept: RHEUMATOLOGY | Facility: CLINIC | Age: 59
End: 2019-02-20

## 2019-02-21 ENCOUNTER — TELEPHONE (OUTPATIENT)
Dept: RHEUMATOLOGY | Facility: CLINIC | Age: 59
End: 2019-02-21

## 2019-02-21 NOTE — TELEPHONE ENCOUNTER
IVIG approved.  Scheduled for Feb 28 and March 1    Called patient to inform her of her appt Feb 28 and March 1 at 10:30 at the chemo infusion center.     Patient states that she will be discharge on Feb 28 and they will take her to the infusion center.     Otherwise, doing well at rehab.  Denies any joint swelling, mouth ulcers, rashes, abdominal pain, diarrhea/constipation,

## 2019-02-22 DIAGNOSIS — Z17.1 MALIGNANT NEOPLASM OF UPPER-OUTER QUADRANT OF LEFT BREAST IN FEMALE, ESTROGEN RECEPTOR NEGATIVE: Primary | Chronic | ICD-10-CM

## 2019-02-22 DIAGNOSIS — C50.412 MALIGNANT NEOPLASM OF UPPER-OUTER QUADRANT OF LEFT BREAST IN FEMALE, ESTROGEN RECEPTOR NEGATIVE: Primary | Chronic | ICD-10-CM

## 2019-02-28 ENCOUNTER — INFUSION (OUTPATIENT)
Dept: INFUSION THERAPY | Facility: HOSPITAL | Age: 59
End: 2019-02-28
Attending: INTERNAL MEDICINE
Payer: COMMERCIAL

## 2019-02-28 VITALS
WEIGHT: 163.38 LBS | TEMPERATURE: 98 F | HEART RATE: 87 BPM | RESPIRATION RATE: 16 BRPM | DIASTOLIC BLOOD PRESSURE: 55 MMHG | BODY MASS INDEX: 27.22 KG/M2 | SYSTOLIC BLOOD PRESSURE: 114 MMHG | HEIGHT: 65 IN

## 2019-02-28 DIAGNOSIS — R13.19 ESOPHAGEAL DYSPHAGIA: ICD-10-CM

## 2019-02-28 DIAGNOSIS — M33.13 DERMATOMYOSITIS: Primary | ICD-10-CM

## 2019-02-28 PROBLEM — K21.00 GASTRO-ESOPHAGEAL REFLUX DISEASE WITH ESOPHAGITIS: Status: ACTIVE | Noted: 2019-02-13

## 2019-02-28 PROBLEM — M62.82 RHABDOMYOLYSIS: Status: RESOLVED | Noted: 2019-01-22 | Resolved: 2019-02-28

## 2019-02-28 PROBLEM — K22.10 EROSIVE ESOPHAGITIS: Status: ACTIVE | Noted: 2019-02-13

## 2019-02-28 PROBLEM — L27.0 DRUG RASH: Status: RESOLVED | Noted: 2019-01-08 | Resolved: 2019-02-28

## 2019-02-28 PROBLEM — M60.9 MYOSITIS: Status: RESOLVED | Noted: 2019-01-22 | Resolved: 2019-02-28

## 2019-02-28 PROBLEM — E53.8 VITAMIN B12 DEFICIENCY: Status: RESOLVED | Noted: 2018-03-08 | Resolved: 2019-02-28

## 2019-02-28 PROBLEM — R23.9 ALTERATION IN SKIN INTEGRITY: Status: RESOLVED | Noted: 2019-02-11 | Resolved: 2019-02-28

## 2019-02-28 PROBLEM — I10 HYPERTENSION: Status: RESOLVED | Noted: 2017-02-06 | Resolved: 2019-02-28

## 2019-02-28 PROBLEM — E87.6 HYPOKALEMIA: Status: RESOLVED | Noted: 2019-01-27 | Resolved: 2019-02-28

## 2019-02-28 PROBLEM — M79.89 SWELLING OF UPPER ARM: Status: RESOLVED | Noted: 2019-01-25 | Resolved: 2019-02-28

## 2019-02-28 PROBLEM — B37.9 CANDIDIASIS: Status: RESOLVED | Noted: 2019-01-22 | Resolved: 2019-02-28

## 2019-02-28 PROBLEM — L30.9 DERMATITIS: Status: RESOLVED | Noted: 2019-01-24 | Resolved: 2019-02-28

## 2019-02-28 PROCEDURE — 25000003 PHARM REV CODE 250: Performed by: STUDENT IN AN ORGANIZED HEALTH CARE EDUCATION/TRAINING PROGRAM

## 2019-02-28 PROCEDURE — S0028 INJECTION, FAMOTIDINE, 20 MG: HCPCS | Performed by: STUDENT IN AN ORGANIZED HEALTH CARE EDUCATION/TRAINING PROGRAM

## 2019-02-28 PROCEDURE — 96365 THER/PROPH/DIAG IV INF INIT: CPT

## 2019-02-28 PROCEDURE — A4216 STERILE WATER/SALINE, 10 ML: HCPCS | Performed by: STUDENT IN AN ORGANIZED HEALTH CARE EDUCATION/TRAINING PROGRAM

## 2019-02-28 PROCEDURE — 25000003 PHARM REV CODE 250: Performed by: INTERNAL MEDICINE

## 2019-02-28 PROCEDURE — 96366 THER/PROPH/DIAG IV INF ADDON: CPT

## 2019-02-28 PROCEDURE — 63600175 PHARM REV CODE 636 W HCPCS: Mod: JG | Performed by: STUDENT IN AN ORGANIZED HEALTH CARE EDUCATION/TRAINING PROGRAM

## 2019-02-28 RX ORDER — GUAIFENESIN 100 MG/5ML
200 SOLUTION ORAL EVERY 4 HOURS PRN
COMMUNITY
Start: 2019-02-27 | End: 2019-03-25

## 2019-02-28 RX ORDER — FAMOTIDINE 10 MG/ML
20 INJECTION INTRAVENOUS
Status: COMPLETED | OUTPATIENT
Start: 2019-02-28 | End: 2019-02-28

## 2019-02-28 RX ORDER — ACETAMINOPHEN 325 MG/1
650 TABLET ORAL
Status: CANCELLED | OUTPATIENT
Start: 2019-02-28

## 2019-02-28 RX ORDER — FOLIC ACID 1 MG/1
4 TABLET ORAL DAILY
COMMUNITY
Start: 2019-02-28 | End: 2019-03-08 | Stop reason: SDUPTHER

## 2019-02-28 RX ORDER — CHOLECALCIFEROL (VITAMIN D3) 25 MCG
2000 TABLET ORAL DAILY
COMMUNITY
Start: 2019-02-28 | End: 2019-03-30

## 2019-02-28 RX ORDER — FAMOTIDINE 10 MG/ML
20 INJECTION INTRAVENOUS
Status: CANCELLED | OUTPATIENT
Start: 2019-02-28

## 2019-02-28 RX ORDER — SODIUM CHLORIDE 0.9 % (FLUSH) 0.9 %
10 SYRINGE (ML) INJECTION
Status: DISCONTINUED | OUTPATIENT
Start: 2019-02-28 | End: 2019-02-28 | Stop reason: HOSPADM

## 2019-02-28 RX ORDER — METHYLPREDNISOLONE 16 MG/1
48 TABLET ORAL DAILY
COMMUNITY
Start: 2019-02-28 | End: 2019-03-15 | Stop reason: SDUPTHER

## 2019-02-28 RX ORDER — AMOXICILLIN 250 MG
2 CAPSULE ORAL
COMMUNITY
Start: 2019-02-27 | End: 2019-08-02

## 2019-02-28 RX ORDER — ACETAMINOPHEN 325 MG/1
650 TABLET ORAL EVERY 4 HOURS PRN
COMMUNITY
Start: 2019-02-27 | End: 2019-12-04

## 2019-02-28 RX ORDER — ACETAMINOPHEN 650 MG/20.3ML
650 LIQUID ORAL
Status: COMPLETED | OUTPATIENT
Start: 2019-02-28 | End: 2019-02-28

## 2019-02-28 RX ORDER — SODIUM CHLORIDE 0.9 % (FLUSH) 0.9 %
10 SYRINGE (ML) INJECTION
Status: CANCELLED | OUTPATIENT
Start: 2019-02-28

## 2019-02-28 RX ORDER — PANTOPRAZOLE SODIUM 40 MG/1
40 FOR SUSPENSION ORAL 2 TIMES DAILY
COMMUNITY
Start: 2019-02-28 | End: 2019-03-25

## 2019-02-28 RX ORDER — ACETAMINOPHEN 325 MG/1
650 TABLET ORAL
Status: DISCONTINUED | OUTPATIENT
Start: 2019-02-28 | End: 2019-02-28

## 2019-02-28 RX ORDER — HEPARIN 100 UNIT/ML
500 SYRINGE INTRAVENOUS
Status: CANCELLED | OUTPATIENT
Start: 2019-02-28

## 2019-02-28 RX ADMIN — ACETAMINOPHEN 650 MG: 650 SOLUTION ORAL at 11:02

## 2019-02-28 RX ADMIN — Medication 10 ML: at 05:02

## 2019-02-28 RX ADMIN — FAMOTIDINE 20 MG: 10 INJECTION, SOLUTION INTRAVENOUS at 11:02

## 2019-02-28 RX ADMIN — HUMAN IMMUNOGLOBULIN G 75 G: 40 LIQUID INTRAVENOUS at 11:02

## 2019-03-01 ENCOUNTER — INFUSION (OUTPATIENT)
Dept: INFUSION THERAPY | Facility: HOSPITAL | Age: 59
End: 2019-03-01
Attending: INTERNAL MEDICINE
Payer: COMMERCIAL

## 2019-03-01 VITALS
DIASTOLIC BLOOD PRESSURE: 61 MMHG | RESPIRATION RATE: 16 BRPM | TEMPERATURE: 98 F | HEART RATE: 79 BPM | SYSTOLIC BLOOD PRESSURE: 117 MMHG

## 2019-03-01 DIAGNOSIS — M33.13 DERMATOMYOSITIS: Primary | ICD-10-CM

## 2019-03-01 DIAGNOSIS — R13.19 ESOPHAGEAL DYSPHAGIA: ICD-10-CM

## 2019-03-01 PROCEDURE — 63600175 PHARM REV CODE 636 W HCPCS: Performed by: INTERNAL MEDICINE

## 2019-03-01 PROCEDURE — 96365 THER/PROPH/DIAG IV INF INIT: CPT

## 2019-03-01 PROCEDURE — A4216 STERILE WATER/SALINE, 10 ML: HCPCS | Performed by: INTERNAL MEDICINE

## 2019-03-01 PROCEDURE — 25000003 PHARM REV CODE 250: Performed by: INTERNAL MEDICINE

## 2019-03-01 PROCEDURE — 96366 THER/PROPH/DIAG IV INF ADDON: CPT

## 2019-03-01 PROCEDURE — S0028 INJECTION, FAMOTIDINE, 20 MG: HCPCS | Performed by: INTERNAL MEDICINE

## 2019-03-01 RX ORDER — ACETAMINOPHEN 325 MG/1
650 TABLET ORAL
Status: DISCONTINUED | OUTPATIENT
Start: 2019-03-01 | End: 2019-03-01

## 2019-03-01 RX ORDER — HEPARIN 100 UNIT/ML
500 SYRINGE INTRAVENOUS
Status: CANCELLED | OUTPATIENT
Start: 2019-03-01

## 2019-03-01 RX ORDER — HEPARIN 100 UNIT/ML
500 SYRINGE INTRAVENOUS
Status: DISCONTINUED | OUTPATIENT
Start: 2019-03-01 | End: 2019-03-01 | Stop reason: HOSPADM

## 2019-03-01 RX ORDER — ACETAMINOPHEN 650 MG/20.3ML
650 LIQUID ORAL
Status: COMPLETED | OUTPATIENT
Start: 2019-03-01 | End: 2019-03-01

## 2019-03-01 RX ORDER — SODIUM CHLORIDE 0.9 % (FLUSH) 0.9 %
10 SYRINGE (ML) INJECTION
Status: DISCONTINUED | OUTPATIENT
Start: 2019-03-01 | End: 2019-03-01 | Stop reason: HOSPADM

## 2019-03-01 RX ORDER — SODIUM CHLORIDE 0.9 % (FLUSH) 0.9 %
10 SYRINGE (ML) INJECTION
Status: CANCELLED | OUTPATIENT
Start: 2019-03-01

## 2019-03-01 RX ORDER — FAMOTIDINE 10 MG/ML
20 INJECTION INTRAVENOUS
Status: COMPLETED | OUTPATIENT
Start: 2019-03-01 | End: 2019-03-01

## 2019-03-01 RX ORDER — ACETAMINOPHEN 325 MG/1
650 TABLET ORAL
Status: CANCELLED | OUTPATIENT
Start: 2019-03-01

## 2019-03-01 RX ORDER — FAMOTIDINE 10 MG/ML
20 INJECTION INTRAVENOUS
Status: CANCELLED | OUTPATIENT
Start: 2019-03-01

## 2019-03-01 RX ADMIN — DIPHENHYDRAMINE HYDROCHLORIDE 50 MG: 50 INJECTION, SOLUTION INTRAMUSCULAR; INTRAVENOUS at 11:03

## 2019-03-01 RX ADMIN — HEPARIN SODIUM (PORCINE) LOCK FLUSH IV SOLN 100 UNIT/ML 500 UNITS: 100 SOLUTION at 03:03

## 2019-03-01 RX ADMIN — FAMOTIDINE 20 MG: 10 INJECTION, SOLUTION INTRAVENOUS at 11:03

## 2019-03-01 RX ADMIN — Medication 10 ML: at 03:03

## 2019-03-01 RX ADMIN — HUMAN IMMUNOGLOBULIN G 75 G: 20 LIQUID INTRAVENOUS at 11:03

## 2019-03-01 RX ADMIN — ACETAMINOPHEN 650 MG: 650 SOLUTION ORAL at 11:03

## 2019-03-01 NOTE — PLAN OF CARE
Problem: Adult Inpatient Plan of Care  Goal: Plan of Care Review  Patient tolerated infusion well. AVS provided, VS stable, discharged to home with  via WC

## 2019-03-04 ENCOUNTER — HOSPITAL ENCOUNTER (OUTPATIENT)
Dept: RADIOLOGY | Facility: HOSPITAL | Age: 59
Discharge: HOME | End: 2019-03-04
Attending: INTERNAL MEDICINE
Payer: COMMERCIAL

## 2019-03-04 DIAGNOSIS — C50.412 MALIGNANT NEOPLASM OF UPPER-OUTER QUADRANT OF LEFT BREAST IN FEMALE, ESTROGEN RECEPTOR NEGATIVE: Chronic | ICD-10-CM

## 2019-03-04 DIAGNOSIS — Z17.1 MALIGNANT NEOPLASM OF UPPER-OUTER QUADRANT OF LEFT BREAST IN FEMALE, ESTROGEN RECEPTOR NEGATIVE: Chronic | ICD-10-CM

## 2019-03-04 LAB — POCT GLUCOSE: 92 MG/DL (ref 70–110)

## 2019-03-04 PROCEDURE — 78815 NM PET CT ROUTINE: ICD-10-PCS | Mod: 26,PS,, | Performed by: RADIOLOGY

## 2019-03-04 PROCEDURE — 78815 PET IMAGE W/CT SKULL-THIGH: CPT | Mod: 26,PS,, | Performed by: RADIOLOGY

## 2019-03-04 PROCEDURE — 78815 PET IMAGE W/CT SKULL-THIGH: CPT | Mod: TC

## 2019-03-04 PROCEDURE — A9552 F18 FDG: HCPCS

## 2019-03-06 NOTE — PROGRESS NOTES
Subjective:       Patient ID: Ermelinda Verde is a 58 y.o. female.    Chief Complaint: No chief complaint on file.    HPI  Mrs Verde is  a very pleasant 58-year-old -American female who returns for a diagnosis of recurrent triple negative left breast cancer.    She is S/P 3 cycles of nab-paclitaxel and Atezolizumab.  Last treatment was January 3, 2019.    Her treatment was complicated by the development of dermatomyositis which resulted in the lengthy hospitalization from January 22nd to February 13th.  She then was transferred to the rehab unit and was discharged last week.  She has been treated with steroids, IVIG, and low-dose methotrexate.  She is receiving home physical therapy and is seen speech pathology as well. She has limited oral intake and is still using her feeding tube.          Onc History:  She developed a palpable abnormality in her left breast in January 2017 which she noted on self-examination.  A diagnostic mammogram on January 19 showed a greater than 1 cm nodule in the upper outer portion of left breast.  By ultrasound this was lobulated and hypoechoic measuring 1.75 x 1.51 x 1.96 cm.     On January 24, 2017 a core needle biopsy was performed which showed infiltrating ductal carcinoma, high grade.  The tumor was ER negative, RI negative, and HER-2 negative.  A follow-up ultrasound on December 6 showed 2.5 x 2.2 x 1.5 cm left breast mass.  There was no abnormality noted in the left axilla.     She underwent sentinel lymph node biopsy on February 22.  That showed 4 negative lymph nodes.     She had 4 cycles of  Wilbur-adjuvantTaxotere and Cytoxan completed  on 5/9/17.     On June 26 she underwent left mastectomy.  That revealed 2 foci of invasive high-grade carcinoma measuring 14 mm and 1.5 mm.  Margins were negative.    She completed 4 cycles of adjuvant Adriamycin on September 12, 2017.      In October 2018, she developed some left supraclavicular lymphadenopathy which turned out to be  recurrence.     A fine-needle aspirate of the lymph node was performed on October 19th.  That showed metastatic carcinoma consistent with breast primary which was ER negative, ND negative and HER 2-negative.    She then received 3 cycles of Nab paclitaxel and atezolizumab.  She last received treatment on January 3, 2019.  Review of Systems   Constitutional: Positive for activity change (Improved) and fatigue. Negative for appetite change, fever and unexpected weight change.   HENT: Positive for trouble swallowing ( improving).    Eyes: Negative for visual disturbance.   Respiratory: Negative for cough and shortness of breath.    Gastrointestinal: Negative.    Genitourinary: Negative.    Skin:        Dryness   Neurological: Negative.    Psychiatric/Behavioral: Negative for dysphoric mood. The patient is not nervous/anxious.        Objective:      Physical Exam   Constitutional: She is oriented to person, place, and time. She appears well-developed and well-nourished.        HENT:   Mouth/Throat: No oropharyngeal exudate.   Facial swelling, slight fullness in the neck but no palpable adenopathy   Eyes: No scleral icterus.   Neck:   Slight fullness   Cardiovascular: Normal rate and regular rhythm.   Pulmonary/Chest: Effort normal and breath sounds normal. No respiratory distress. Right breast exhibits no mass, no nipple discharge and no skin change.       Abdominal: Soft. She exhibits no mass. There is no tenderness.   Musculoskeletal:        Lymphadenopathy:     She has no cervical adenopathy.   Neurological: She is alert and oriented to person, place, and time.   Skin: Skin is dry.   Slightly hyperpigmented scaly skin especially in distal lower extremities   Psychiatric: She has a normal mood and affect. Her behavior is normal. Thought content normal.   Vitals reviewed.      Assessment:     PET-CT shows complete resolution of previous areas malignant adenopathy. No new abnormal areas are present.    CBC shows white  count 3410 with an ANC of 1400, hemoglobin 9.9 and platelet count 160380.  Metabolic profile shows an AST of 56, ALT of 40, bilirubin 0.3, CPK of 84, creatinine 0.7  1. Malignant neoplasm of upper-outer quadrant of left breast in female, estrogen receptor negative    2. Regional lymph node metastasis present    3. Polymyositis associated with autoimmune disease       Plan:       Consider consolidation radiation therapy to her previous sites of metastasis.Distress Screening Results: Psychosocial Distress screening score of Distress Score: 0 noted and reviewed. No intervention indicated.

## 2019-03-07 ENCOUNTER — OFFICE VISIT (OUTPATIENT)
Dept: HEMATOLOGY/ONCOLOGY | Facility: CLINIC | Age: 59
End: 2019-03-07
Payer: COMMERCIAL

## 2019-03-07 VITALS
HEIGHT: 65 IN | RESPIRATION RATE: 18 BRPM | TEMPERATURE: 99 F | DIASTOLIC BLOOD PRESSURE: 64 MMHG | OXYGEN SATURATION: 100 % | WEIGHT: 163.81 LBS | BODY MASS INDEX: 27.29 KG/M2 | HEART RATE: 102 BPM | SYSTOLIC BLOOD PRESSURE: 117 MMHG

## 2019-03-07 DIAGNOSIS — Z17.1 MALIGNANT NEOPLASM OF UPPER-OUTER QUADRANT OF LEFT BREAST IN FEMALE, ESTROGEN RECEPTOR NEGATIVE: Primary | Chronic | ICD-10-CM

## 2019-03-07 DIAGNOSIS — C50.412 MALIGNANT NEOPLASM OF UPPER-OUTER QUADRANT OF LEFT BREAST IN FEMALE, ESTROGEN RECEPTOR NEGATIVE: Primary | Chronic | ICD-10-CM

## 2019-03-07 DIAGNOSIS — C77.9 REGIONAL LYMPH NODE METASTASIS PRESENT: ICD-10-CM

## 2019-03-07 DIAGNOSIS — M33.20 POLYMYOSITIS ASSOCIATED WITH AUTOIMMUNE DISEASE: ICD-10-CM

## 2019-03-07 DIAGNOSIS — M35.9 POLYMYOSITIS ASSOCIATED WITH AUTOIMMUNE DISEASE: ICD-10-CM

## 2019-03-07 PROCEDURE — 3008F PR BODY MASS INDEX (BMI) DOCUMENTED: ICD-10-PCS | Mod: CPTII,S$GLB,, | Performed by: INTERNAL MEDICINE

## 2019-03-07 PROCEDURE — 99213 PR OFFICE/OUTPT VISIT, EST, LEVL III, 20-29 MIN: ICD-10-PCS | Mod: S$GLB,,, | Performed by: INTERNAL MEDICINE

## 2019-03-07 PROCEDURE — 3074F PR MOST RECENT SYSTOLIC BLOOD PRESSURE < 130 MM HG: ICD-10-PCS | Mod: CPTII,S$GLB,, | Performed by: INTERNAL MEDICINE

## 2019-03-07 PROCEDURE — 99999 PR PBB SHADOW E&M-EST. PATIENT-LVL IV: CPT | Mod: PBBFAC,,, | Performed by: INTERNAL MEDICINE

## 2019-03-07 PROCEDURE — 3074F SYST BP LT 130 MM HG: CPT | Mod: CPTII,S$GLB,, | Performed by: INTERNAL MEDICINE

## 2019-03-07 PROCEDURE — 3008F BODY MASS INDEX DOCD: CPT | Mod: CPTII,S$GLB,, | Performed by: INTERNAL MEDICINE

## 2019-03-07 PROCEDURE — 99213 OFFICE O/P EST LOW 20 MIN: CPT | Mod: S$GLB,,, | Performed by: INTERNAL MEDICINE

## 2019-03-07 PROCEDURE — 3078F PR MOST RECENT DIASTOLIC BLOOD PRESSURE < 80 MM HG: ICD-10-PCS | Mod: CPTII,S$GLB,, | Performed by: INTERNAL MEDICINE

## 2019-03-07 PROCEDURE — 3078F DIAST BP <80 MM HG: CPT | Mod: CPTII,S$GLB,, | Performed by: INTERNAL MEDICINE

## 2019-03-07 PROCEDURE — 99999 PR PBB SHADOW E&M-EST. PATIENT-LVL IV: ICD-10-PCS | Mod: PBBFAC,,, | Performed by: INTERNAL MEDICINE

## 2019-03-08 ENCOUNTER — TELEPHONE (OUTPATIENT)
Dept: HEMATOLOGY/ONCOLOGY | Facility: CLINIC | Age: 59
End: 2019-03-08

## 2019-03-08 RX ORDER — FOLIC ACID 1 MG/1
4 TABLET ORAL DAILY
Qty: 120 TABLET | Refills: 0 | Status: SHIPPED | OUTPATIENT
Start: 2019-03-08 | End: 2019-04-07 | Stop reason: SDUPTHER

## 2019-03-08 NOTE — TELEPHONE ENCOUNTER
Spoke with patient. Pt informed Lavinia in Human Resources at her work is requesting, if possible, from Dr Reyna a written statement letter with an estimated return to work date for the patient. Pt knows she may need to have radiation and is thinking the estimated return date may be in 3-4 months. Informed pt, would forward this information to Dr Reyna to see if he'd be willing to write this for her, so we can fax it to Lavinia in HR dept.      ----- Message from Claudette Giles sent at 3/8/2019 12:09 PM CST -----  Contact: Pt   Pt called to speak with nurse fraser have some questions   Callback#[phone]  Thank You  TANMAY giles

## 2019-03-12 ENCOUNTER — TELEPHONE (OUTPATIENT)
Dept: ADMINISTRATIVE | Facility: CLINIC | Age: 59
End: 2019-03-12

## 2019-03-12 NOTE — TELEPHONE ENCOUNTER
Home Health SOC 03/02/2019 to 04/30/2019 with [facility] , Dr Larry Velez. SN, PT, OT, ST services.

## 2019-03-13 ENCOUNTER — TELEPHONE (OUTPATIENT)
Dept: INTERNAL MEDICINE | Facility: CLINIC | Age: 59
End: 2019-03-13

## 2019-03-13 ENCOUNTER — PATIENT MESSAGE (OUTPATIENT)
Dept: INTERNAL MEDICINE | Facility: CLINIC | Age: 59
End: 2019-03-13

## 2019-03-13 NOTE — TELEPHONE ENCOUNTER
----- Message from Ramos An sent at 3/13/2019  9:30 AM CDT -----  Contact: [email or web address]  Message from [facility], System Message sent at 3/8/2019 12:08 PM CST -----    Appointment Request From: Ermelinda Verde    With Provider: Reta Weeks MD [[facility] - Internal Medicine]    Preferred Date Range: 3/11/2019 - 3/29/2019    Preferred Times: Any time    Reason for visit: Existing Patient    Comments:  I am requesting a follow up  appointment that I missed.  I was in the hospital for the 40 days.    Thanks, Ermelinda

## 2019-03-13 NOTE — PROGRESS NOTES
RHEUMATOLOGY CLINIC INITIAL VISIT    Chief complaints:- Myositis       HPI:-  Ermelinda Javed a 58 y.o.F with history of L sided infiltrating ductal carcinoma of the L breast (dx on Jan 2017), HTN, depression, gastritis, and recently diagnosed myositis (uncertain if it's autoimmune vs medication induced), recently discharge to rehab present to the clinic today for hospital follow up.    Patient was initially send from hem/onc clinic for evaluation of possible inflammatory myositis.  Patient was hospitalized from 1/22/19 till 2/13/19 for myositis.      L breast cancer s/p completion of 4 cycles of demi-adjuvant taxotere and cytoxan (completed on 5/9/17) and mastectomy (6/26/17) and then 4 cycles of adjuvant Adriamycin (completed 9/12/17). In 10/2017 - patient developed L supraclavicular lymphadenopathy which FNA confirmed as reoccurrence of previously treated breast cancer.      Patient started on Atelizumab/Abraxane on 11/7/18. Per chart review, patient noticed rashes on the dorsum of her hands on 11/11/18. Seen in urgent care and given topical steroid cream. Then received two more infusions on Atelizumab/Abraxane on 11/21/18 and 12/5/18. Started to notice puffiness around the eyes on 12/11/18. Received another Abraxane infusion on 12/12/18 but this time with hydrocortisone 50 mg which helped reduce the swelling around the eyes. Developed swelling of the face again on 12/15/18. Next infusion of Abraxane done on 12/19/18 with Solucortef and Atelizumab held. Abraxane given again on 1/3/19 with no IV steroid. Patient developed swelling of the face on 1/4/19 and went to urgent care and given short course of prednisone 20 mg BID. On 1/15/19, patient seen in hem/onc clinic with c/o of pressure and tightness around neck. CT scan of chest and neck did not reveal any vascular compression. Started on prednisone 60 mg with taper. On 1/22/18 patient with c/o proximal  muscle weakness. CPK found to be elevated around 4k. Patient admitted and given solumedrol 80 mg IV on 1/22/18. Last infusion of Atelizumab on 12/19/18. Last infusion of Abraxane on 1/3/19. Given Solumedrol 1g x 1 on 1/23/18. Seen by Dermatology on 1/24/18 and biopsy done of skin rash. Given distribution of rashes, there was concern for dermatomyositis. Patient started on Solumedrol 125 mg IV BID at this time.     During her hospitalization patient was started on high dose steroid Solumedrol 80mg IV x 1, 1g x 1, and then 125mg BID until tapered down to medrol 48mg.  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  Patient was started on IVIG (400mg/kg/daily x 5 day) 1/29/19-2/2.   Patient started on MTX 20mg SQ 2/5 with folic acid. MTX SQ was transitioned to PO because concern about rehab administration.  Patient failed swallow eval and PEG tube was placed.        Denies any family history of autoimmune diseases.     No smoking, EtOH, recreational drug usage.     Denies any photosensitivity, joint swelling, unintentional weigh loss, abdominal pain, night sweats, CP, SOB.  +oral thrush.     Completed rehab at [facility].  Completed IVIG (2/28 and 3/1).  Had mild HA after infusion.  Doing well.  A lot stronger - able to do household chores since discharge.  Not back at baseline yet ~80%.  Mild weakness with increased activities.  Able to ambulate without assistance (but is still a fall precaution).  Tolerating diet via PEG tube.  HHC and PT 2x/week.     MTX discontinued 2/18 with concern of toxicity (decrease blood counts and transaminatis).  Folic acid increased to 4mg daily.  Still on medrol 48mg daily with Bactrim for PCP prophylaxis.     Oncology plans for radiation therapy - scheduled for initial radiation visit next Tuesday.  Scheduling for swallow eval study - still using PEG tube.    Denies any new rashes, alopecia, arthralgia, abdominal pain, CP, or SOB, N/V, fever/chills.       Review of Systems    Constitutional: Negative for chills, diaphoresis, fever, malaise/fatigue and weight loss.   HENT: Negative for congestion, ear discharge, ear pain, hearing loss, nosebleeds, sinus pain and tinnitus.    Eyes: Negative for photophobia, pain, discharge and redness.   Respiratory: Negative for cough, hemoptysis, sputum production, shortness of breath, wheezing and stridor.    Cardiovascular: Negative for chest pain, palpitations, orthopnea, claudication, leg swelling and PND.   Gastrointestinal: Negative for abdominal pain, constipation, diarrhea, heartburn, nausea and vomiting.   Genitourinary: Negative for dysuria, frequency, hematuria and urgency.   Musculoskeletal: Positive for myalgias. Negative for back pain, joint pain and neck pain.   Skin: Negative for rash.   Neurological: Positive for weakness. Negative for dizziness, tingling, tremors and headaches.   Endo/Heme/Allergies: Does not bruise/bleed easily.   Psychiatric/Behavioral: Negative for depression, hallucinations and suicidal ideas. The patient is not nervous/anxious and does not have insomnia.        Past Medical History:   Diagnosis Date    Breast cancer 2017    left    Depression     Diverticulosis     Gastritis     Hypertension     Vitamin B12 deficiency 3/8/2018       Past Surgical History:   Procedure Laterality Date    BIOPSY-SENTINEL NODE Left 2017    Performed by [facility]o [facility] at [facility] OR    BREAST BIOPSY Left     BREAST RECONSTRUCTION Left 2017     SECTION      COLONOSCOPY  2011    repeat in 10 yrs.    D&C      EGD (ESOPHAGOGASTRODUODENOSCOPY) N/A 2019    Performed by Pernell Rosas [facility] at [facility] ENDO (2ND FLR)    INSERTION, PEG TUBE N/A 2019    Performed by Zurdo Gordon MD at [facility] ENDO (2ND FLR)    FQCBJBPTM-TWWW-H-CATH Right 10/31/2018    Performed by Deo Palencia MD at [facility] OR 2ND FLR    OMLQBOAYP-QVYL-Z-CATH Right 2017    Performed by Alfredo English MD at [facility] OR     "CWLONDCQO-FANB-M-CATH-neck or chest Fluoro needed Consent AM of surgery Right 10/30/2018    Performed by Deo Palencia MD at [facility] OR 2ND FLR    MASTECTOMY Left 06/26/2017    MASTECTOMY Left 6/26/2017    Performed by Regina Rose MD at [facility]    MYOMECTOMY      PORTACATH PLACEMENT      RECONSTRUCTION-BREAST/FLAP-SCOTT Left 6/26/2017    Performed by Sukhjinder Sewell MD at [facility] OR    SENTINEL LYMPH NODE BIOPSY  02/2017    left    TOTAL REDUCTION MAMMOPLASTY Right 2017        Social History     Tobacco Use    Smoking status: Never Smoker    Smokeless tobacco: Never Used   Substance Use Topics    Alcohol use: Yes     Comment: wine    Drug use: No       Family History   Problem Relation Age of Onset    Heart disease Father     Hypertension Father     Breast cancer Sister 52    Hypertension Mother     No Known Problems Brother     Thyroid disease Daughter         hyperthyroidism s/p thyroidectomy    No Known Problems Son     No Known Problems Brother     No Known Problems Brother     No Known Problems Sister     No Known Problems Daughter     Colon cancer Neg Hx     Ovarian cancer Neg Hx        Review of patient's allergies indicates:  No Known Allergies        Physical examination:-    Vitals:    03/15/19 1258   BP: 137/88   Pulse: 89   Weight: 76.9 kg (169 lb 8.5 oz)   Height: 5' 5" (1.651 m)   PainSc: 0-No pain       Physical Exam   Constitutional: She is oriented to person, place, and time and well-developed, well-nourished, and in no distress.   HENT:   Head: Normocephalic and atraumatic.   Eyes: Conjunctivae are normal. Pupils are equal, round, and reactive to light.   Neck: Normal range of motion. Neck supple. Tracheal deviation: tpmt.   Cardiovascular: Normal rate, regular rhythm and normal heart sounds.   Pulmonary/Chest: Effort normal and breath sounds normal.   Abdominal: Soft. Bowel sounds are normal. Rebound: pmt.   Musculoskeletal: She exhibits no edema or tenderness.   Right " Side Rheumatological Exam      Muscle Strength (0-5 scale):  Neck Flexion:  4  Neck Extension: 5  Deltoid:  4  Biceps: 5/5   Triceps:  4.4  : 4.6/5   Iliopsoas: 4.0  Quadriceps:  5   Distal Lower Extremity: 5     Left Side Rheumatological Exam      Muscle Strength (0-5 scale):  Neck Flexion:  5  Neck Extension: 5  Deltoid:  4.5  Biceps: 5/5   Triceps:  4.7  :  5  Iliopsoas: 4.0  Quadriceps:  5   Distal Lower Extremity: 5     ROM intact   Neurological: She is alert and oriented to person, place, and time. Gait normal.   Skin: Skin is warm and dry.   Guttron's papules on hands - improved   Nape of the neck and bilateral lower extremities (shins) rash still present - improvement from hospitalization      Psychiatric: Mood, memory, affect and judgment normal.       Labs:-  Results for ROMEO PEREZ (MRN [id number]) as of 2/15/2019 15:30   Ref. Range 2/10/2019 04:00 2/11/2019 04:00 2/13/2019 03:21 2/13/2019 08:33 2/13/2019 10:45   WBC Latest Ref Range: 3.90 - 12.70 K/uL 4.79 4.57 3.69 (L)     RBC Latest Ref Range: 4.00 - 5.40 M/uL 2.55 (L) 2.49 (L) 2.60 (L)     Hemoglobin Latest Ref Range: 12.0 - 16.0 g/dL 7.0 (L) 7.2 (L) 7.1 (L)     Hematocrit Latest Ref Range: 37.0 - 48.5 % 22.7 (L) 22.7 (L) 23.8 (L)     MCV Latest Ref Range: 82 - 98 fL 89 91 92     MCH Latest Ref Range: 27.0 - 31.0 pg 27.5 28.9 27.3     MCHC Latest Ref Range: 32.0 - 36.0 g/dL 30.8 (L) 31.7 (L) 29.8 (L)     RDW Latest Ref Range: 11.5 - 14.5 % 20.0 (H) 20.1 (H) 20.7 (H)     Platelets Latest Ref Range: 150 - 350 K/uL 152 161 149 (L)     MPV Latest Ref Range: 9.2 - 12.9 fL 10.1 9.6 9.8     Gran% Latest Ref Range: 38.0 - 73.0 % 71.6 77.7 (H) 79.1 (H)     Gran # (ANC) Latest Ref Range: 1.8 - 7.7 K/uL 3.4 3.6 2.9     Immature Granulocytes Latest Ref Range: 0.0 - 0.5 % 0.4 0.4 0.5     Immature Grans (Abs) Latest Ref Range: 0.00 - 0.04 K/uL 0.02 0.02 0.02     Lymph% Latest Ref Range: 18.0 - 48.0 % 21.3 16.0 (L) 14.4 (L)     Lymph # Latest Ref Range:  1.0 - 4.8 K/uL 1.0 0.7 (L) 0.5 (L)     Mono% Latest Ref Range: 4.0 - 15.0 % 6.3 5.7 6.0     Mono # Latest Ref Range: 0.3 - 1.0 K/uL 0.3 0.3 0.2 (L)     Eosinophil% Latest Ref Range: 0.0 - 8.0 % 0.4 0.2 0.0     Eos # Latest Ref Range: 0.0 - 0.5 K/uL 0.0 0.0 0.0     Basophil% Latest Ref Range: 0.0 - 1.9 % 0.0 0.0 0.0     Baso # Latest Ref Range: 0.00 - 0.20 K/uL 0.00 0.00 0.00     nRBC Latest Ref Range: 0 /100 WBC 0 0 1 (A)     Differential Method Unknown Automated Automated Automated     Retic Latest Ref Range: 0.5 - 2.5 % 3.0 (H)       Sodium Latest Ref Range: 136 - 145 mmol/L 136  136     Potassium Latest Ref Range: 3.5 - 5.1 mmol/L 3.7  3.9     Chloride Latest Ref Range: 95 - 110 mmol/L 104  103     CO2 Latest Ref Range: 23 - 29 mmol/L 28  27     Anion Gap Latest Ref Range: 8 - 16 mmol/L 4 (L)  6 (L)     BUN, Bld Latest Ref Range: 6 - 20 mg/dL 24 (H)  19     Creatinine Latest Ref Range: 0.5 - 1.4 mg/dL 0.5  0.5     eGFR if non African American Latest Ref Range: >60 mL/min/1.73 m^2 >60.0  >60.0     eGFR if African American Latest Ref Range: >60 mL/min/1.73 m^2 >60.0  >60.0     Glucose Latest Ref Range: 70 - 110 mg/dL 106  103     Calcium Latest Ref Range: 8.7 - 10.5 mg/dL 8.1 (L)  8.2 (L)     Phosphorus Latest Ref Range: 2.7 - 4.5 mg/dL 1.9 (L)       Magnesium Latest Ref Range: 1.6 - 2.6 mg/dL 1.8       Alkaline Phosphatase Latest Ref Range: 55 - 135 U/L 65  72     Total Protein Latest Ref Range: 6.0 - 8.4 g/dL 5.6 (L)  5.9 (L)     Albumin Latest Ref Range: 3.5 - 5.2 g/dL 2.4 (L)  2.6 (L)     Total Bilirubin Latest Ref Range: 0.1 - 1.0 mg/dL 0.3  0.3     AST Latest Ref Range: 10 - 40 U/L 63 (H)  67 (H)     ALT Latest Ref Range: 10 - 44 U/L 37  42     CPK Latest Ref Range: 20 - 180 U/L 166       Group & Rh Unknown     O POS   INDIRECT RENEE Unknown     NEG   PREPARE RBC SOFT Unknown    Rpt    UNIT NUMBER Unknown    [id number]    PRODUCT CODE Unknown    [id number]    DISPENSE STATUS Unknown    TRANSFUSED     Aldolase Latest Ref Range: 1.2 - 7.6 U/L 3.2         Results for ROMOE PEREZ (MRN [id number]) as of 2/15/2019 15:30   Ref. Range 1/22/2019 22:24 1/25/2019 17:33 1/28/2019 19:31 1/29/2019 05:00 1/29/2019 12:27   DARCY HEP-2 Titer Unknown Positive 1:640 Sp...       Anti-SSA Antibody Latest Ref Range: 0.00 - 19.99 EU 0.49       Anti-SSA Interpretation Latest Ref Range: Negative  Negative       Anti-SSB Antibody Latest Ref Range: 0.00 - 19.99 EU 0.11       Anti-SSB Interpretation Latest Ref Range: Negative  Negative       ds DNA Ab Latest Ref Range: Negative 1:10  Negative 1:10       Anti Sm Antibody Latest Ref Range: 0.00 - 19.99 EU 0.58       Anti-Sm Interpretation Latest Ref Range: Negative  Negative       Anti Sm/RNP Antibody Latest Ref Range: 0.00 - 19.99 EU 0.62       Anti-Sm/RNP Interpretation Latest Ref Range: Negative  Negative       DARCY Screen Latest Ref Range: Negative <1:160  Positive (A)       Complement (C-3) Latest Ref Range: 50 - 180 mg/dL  106      Complement (C-4) Latest Ref Range: 11 - 44 mg/dL  29      IgG - Serum Latest Ref Range: 650 - 1600 mg/dL   932     IgM Latest Ref Range: 50 - 300 mg/dL   66     IgA Latest Ref Range: 40 - 350 mg/dL   533 (H)     Anti-Meg-1 Antibody Latest Ref Range: <20 Units    <20    Anti-PM/Scl Ab Latest Ref Range: <20 Units    <20    Anti-SS-A 52 kD Ab, IgG Latest Ref Range: <20 Units    23 (H)    Anti-U1-RNP  Ab Latest Ref Range: <20 Units    <20    EJ Latest Ref Range: Negative     Negative    EJ Autoantibodies Latest Ref Range: Not Detected  Not Detected       Fibrillarin (U3 RNP) Latest Ref Range: Negative     Negative    HMGCR Antibody Latest Ref Range: 0 - 19 Units     <3   Meg-1 Autoantibodies Latest Ref Range: <1.0 Index <1.00       Ku Latest Ref Range: Negative     Negative    Ku Autoantibodies Latest Ref Range: Not Detected  Not Detected       MDA-5 (P140) (CADM-140) Latest Ref Range: <20 Units    <20    MI-2 Latest Ref Range: Negative     Negative    MI-2  Autoab Latest Ref Range: Not Detected  Not Detected       NXP-2 (P140) Latest Ref Range: <20 Units    <20    OJ Latest Ref Range: Negative     Negative    OJ Autoantibodies Latest Ref Range: Not Detected  Not Detected       PL-12 Latest Ref Range: Negative     Negative    PL-12 Autoantibodies Latest Ref Range: Not Detected  Not Detected       PL-7 Latest Ref Range: Negative     Negative    PL-7 Autoantibodies Latest Ref Range: Not Detected  Not Detected       SRP Latest Ref Range: Negative     Negative    SRP Autoantibodies Latest Ref Range: Not Detected  Not Detected       TIF1 GAMMA (P155/140) Latest Ref Range: <20 Units    25 (H)    U2 snRNP Latest Ref Range: Negative     Negative      Radiographs:-  Independent visualization of images done.   CXR (1/28) - clear lungs  PET scan - no pulmonary/GI activity.       Medication List with Changes/Refills   New Medications    LEUCOVORIN (WELLCOVORIN) 5 MG TAB    Take 1 tablet (5 mg total) by mouth once daily.   Current Medications    ACETAMINOPHEN (TYLENOL) 325 MG TABLET    650 mg by Tube route every 4 (four) hours as needed for Pain.    ESCITALOPRAM OXALATE (LEXAPRO) 10 MG TABLET    1 tablet (10 mg total) by Per G Tube route once daily.    FAMOTIDINE (PEPCID) 20 MG TABLET    1 tablet (20 mg total) by Per G Tube route 2 (two) times daily.    FLUTICASONE (FLONASE) 50 MCG/ACTUATION NASAL SPRAY    SHAKE LIQUID AND USE 1 SPRAY(50 MCG) IN EACH NOSTRIL EVERY DAY    FOLIC ACID (FOLVITE) 1 MG TABLET    4 tablets (4 mg total) by Per G Tube route once daily.    GUAIFENESIN 100 MG/5 ML (ROBITUSSIN) 100 MG/5 ML SYRUP    200 mg by Tube route every 4 (four) hours as needed for Congestion.    PANTOPRAZOLE (PROTONIX) 40 MG GRPS    40 mg by Tube route 2 (two) times daily.    SENNA-DOCUSATE 8.6-50 MG (PERICOLACE) 8.6-50 MG PER TABLET    2 tablets by Tube route daily with lunch.    SULFAMETHOXAZOLE-TRIMETHOPRIM 800-160MG (BACTRIM DS) 800-160 MG TAB    Take 1 tablet by mouth 3 (three) times  a week. Please take 1 tablet M/W/F    VITAMIN D (VITAMIN D3) 1000 UNITS TAB    2,000 Units by Tube route once daily.   Changed and/or Refilled Medications    Modified Medication Previous Medication    METHYLPREDNISOLONE (MEDROL) 16 MG TAB methylPREDNISolone (MEDROL) 16 MG Tab       Take 2 tablets (32 mg total) by mouth once daily.    48 mg by Tube route once daily.       Assessment/Plans:-   58 y.o.F with history of L sided infiltrating ductal carcinoma of the L breast (dx on Jan 2017), HTN, depression, gastritis, and recently diagnosed myositis (uncertain if it's autoimmune vs medication induced), recently discharge to rehab present to the clinic today for hospital follow up.       Patient started on Atelizumab/Abraxane on 11/7/18. Per chart review, patient noticed rashes on the dorsum of her hands on 11/11/18. Seen in urgent care and given topical steroid cream. Then received two more infusions on Atelizumab/Abraxane on 11/21/18 and 12/5/18. Started to notice puffiness around the eyes on 12/11/18. Received another Abraxane infusion on 12/12/18 but this time with hydrocortisone 50 mg which helped reduce the swelling around the eyes. Developed swelling of the face again on 12/15/18. Next infusion of Abraxane done on 12/19/18 with Solucortef and Atelizumab held. Abraxane given again on 1/3/19 with no IV steroid. Patient developed swelling of the face on 1/4/19 and went to urgent care and given short course of prednisone 20 mg BID. On 1/15/19, patient seen in hem/onc clinic with c/o of pressure and tightness around neck. CT scan of chest and neck did not reveal any vascular compression. Started on prednisone 60 mg with taper. On 1/22/18 patient with c/o proximal muscle weakness. CPK found to be elevated around 4k. Patient admitted and given solumedrol 80 mg IV on 1/22/18. Last infusion of Atelizumab on 12/19/18. Last infusion of Abraxane on 1/3/19. Given Solumedrol 1g x 1 on 1/23/18. Seen by Dermatology on 1/24/18 and  biopsy done of skin rash. Given distribution of rashes, there was concern for dermatomyositis. Patient started on Solumedrol 125 mg IV BID at that time.  Skin biopsy resulted consistent with dermatomyositis.     Labs: CPK and Aldolase normalized. +DARCY 1:640 speckled with negative profile.  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 quantTB: indeterminate, T-spot: negative     Spirometry: normal, normal diffusion capacity. FVC: 81%, DLCO: 114%     Patient exhibits signs of dermatomyositis (heliotropic rash and gottron's papules).   Dermatomyositis can be associated with malignancy.  MRI of LUE showed edema of the deltoid and biceps.  Exam with proximal muscle weakness: b/l deltoids 4/5, b/l iliopsoas 4.4/5. 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   Given +TIF1 gamma positivity, most likely dermatomyositis is from underlying malignancy.      Failed 2nd swallow eval on 2/6/19. PEG placed 2/7/2019.     Current medications:  - medrol 48mg since 02/02/19  - IVIG 1/29/19-2/2, 2/28-3/1  - folic acid daily 4mg     Esophageal biopsy - negative for viral infection.  Nonspecific chronic inflammation.    If patient passes swallow eval and continues to improve - consideration to restart MTX (lower dose) if all lab work normalizes or Imuran after successful tapering of steroid.     Plan:  - CBC, CMP, CPK, Aldolase to be done prior to next visit  - decrease medrol 48mg to 32mg PO daily   - GI referral for colonoscopy (cancer screening).  Last colonoscopy was 2011/2012 - per patient normal and next scheduled  colonoscopy was in 10 years.   - continue folic acid 4mg daily  - Leucovirin 5mg daily   - continue rehab - muscle strengthening   - will need to have all vaccination complete - Prevnar, shingles  - 1200 mg dietary calcium and Vitamin D3 1000 mg daily  - continue H2 blocker  - addition of Bactrim DS M/W/F for PCP prophylaxis   - IVIG scheduled for April 1st/2nd at 10:30am   - message/call immediately if worsening muscle weakness  - follow up with swallow eval    - TPMT - consideration to start imuran       # RTC in 4 weeks

## 2019-03-15 ENCOUNTER — OFFICE VISIT (OUTPATIENT)
Dept: RHEUMATOLOGY | Facility: CLINIC | Age: 59
End: 2019-03-15
Payer: COMMERCIAL

## 2019-03-15 VITALS
HEIGHT: 65 IN | WEIGHT: 169.56 LBS | BODY MASS INDEX: 28.25 KG/M2 | SYSTOLIC BLOOD PRESSURE: 137 MMHG | HEART RATE: 89 BPM | DIASTOLIC BLOOD PRESSURE: 88 MMHG

## 2019-03-15 DIAGNOSIS — D84.821 IMMUNOSUPPRESSION DUE TO DRUG THERAPY: ICD-10-CM

## 2019-03-15 DIAGNOSIS — Z79.899 IMMUNOSUPPRESSION DUE TO DRUG THERAPY: ICD-10-CM

## 2019-03-15 DIAGNOSIS — M35.9 POLYMYOSITIS ASSOCIATED WITH AUTOIMMUNE DISEASE: ICD-10-CM

## 2019-03-15 DIAGNOSIS — M33.13 DERMATOMYOSITIS: Primary | ICD-10-CM

## 2019-03-15 DIAGNOSIS — M33.20 POLYMYOSITIS ASSOCIATED WITH AUTOIMMUNE DISEASE: ICD-10-CM

## 2019-03-15 PROCEDURE — 3079F DIAST BP 80-89 MM HG: CPT | Mod: CPTII,S$GLB,, | Performed by: STUDENT IN AN ORGANIZED HEALTH CARE EDUCATION/TRAINING PROGRAM

## 2019-03-15 PROCEDURE — 99214 PR OFFICE/OUTPT VISIT, EST, LEVL IV, 30-39 MIN: ICD-10-PCS | Mod: S$GLB,,, | Performed by: STUDENT IN AN ORGANIZED HEALTH CARE EDUCATION/TRAINING PROGRAM

## 2019-03-15 PROCEDURE — 3075F PR MOST RECENT SYSTOLIC BLOOD PRESS GE 130-139MM HG: ICD-10-PCS | Mod: CPTII,S$GLB,, | Performed by: STUDENT IN AN ORGANIZED HEALTH CARE EDUCATION/TRAINING PROGRAM

## 2019-03-15 PROCEDURE — 3008F BODY MASS INDEX DOCD: CPT | Mod: CPTII,S$GLB,, | Performed by: STUDENT IN AN ORGANIZED HEALTH CARE EDUCATION/TRAINING PROGRAM

## 2019-03-15 PROCEDURE — 3079F PR MOST RECENT DIASTOLIC BLOOD PRESSURE 80-89 MM HG: ICD-10-PCS | Mod: CPTII,S$GLB,, | Performed by: STUDENT IN AN ORGANIZED HEALTH CARE EDUCATION/TRAINING PROGRAM

## 2019-03-15 PROCEDURE — 99214 OFFICE O/P EST MOD 30 MIN: CPT | Mod: S$GLB,,, | Performed by: STUDENT IN AN ORGANIZED HEALTH CARE EDUCATION/TRAINING PROGRAM

## 2019-03-15 PROCEDURE — 3008F PR BODY MASS INDEX (BMI) DOCUMENTED: ICD-10-PCS | Mod: CPTII,S$GLB,, | Performed by: STUDENT IN AN ORGANIZED HEALTH CARE EDUCATION/TRAINING PROGRAM

## 2019-03-15 PROCEDURE — 3075F SYST BP GE 130 - 139MM HG: CPT | Mod: CPTII,S$GLB,, | Performed by: STUDENT IN AN ORGANIZED HEALTH CARE EDUCATION/TRAINING PROGRAM

## 2019-03-15 PROCEDURE — 99999 PR PBB SHADOW E&M-EST. PATIENT-LVL III: ICD-10-PCS | Mod: PBBFAC,,, | Performed by: STUDENT IN AN ORGANIZED HEALTH CARE EDUCATION/TRAINING PROGRAM

## 2019-03-15 PROCEDURE — 99999 PR PBB SHADOW E&M-EST. PATIENT-LVL III: CPT | Mod: PBBFAC,,, | Performed by: STUDENT IN AN ORGANIZED HEALTH CARE EDUCATION/TRAINING PROGRAM

## 2019-03-15 RX ORDER — LEUCOVORIN CALCIUM 5 MG/1
5 TABLET ORAL DAILY
Qty: 30 TABLET | Refills: 1 | Status: SHIPPED | OUTPATIENT
Start: 2019-03-15 | End: 2019-04-14

## 2019-03-15 RX ORDER — METHYLPREDNISOLONE 16 MG/1
32 TABLET ORAL DAILY
Qty: 60 TABLET | Refills: 0 | Status: SHIPPED | OUTPATIENT
Start: 2019-03-15 | End: 2019-04-14

## 2019-03-15 NOTE — PATIENT INSTRUCTIONS
Tapering of steroids   - Please take 2 pills of the medrol (instead of 3)    Please continue folic acid daily     Please continue to take Bactrim M/W/F    Start taking Leucovorin 5mg - one tablet a day     Call if you start to have muscle weakness immediately    Lab work to be done prior to the next visit

## 2019-03-18 ENCOUNTER — TELEPHONE (OUTPATIENT)
Dept: PHYSICAL MEDICINE AND REHAB | Facility: CLINIC | Age: 59
End: 2019-03-18

## 2019-03-18 ENCOUNTER — TELEPHONE (OUTPATIENT)
Dept: RHEUMATOLOGY | Facility: CLINIC | Age: 59
End: 2019-03-18

## 2019-03-18 DIAGNOSIS — D84.821 IMMUNOSUPPRESSION DUE TO DRUG THERAPY: Primary | ICD-10-CM

## 2019-03-18 DIAGNOSIS — Z79.899 IMMUNOSUPPRESSION DUE TO DRUG THERAPY: Primary | ICD-10-CM

## 2019-03-18 DIAGNOSIS — R13.19 ESOPHAGEAL DYSPHAGIA: Primary | ICD-10-CM

## 2019-03-18 RX ORDER — ATOVAQUONE 750 MG/5ML
1500 SUSPENSION ORAL DAILY
Qty: 300 ML | Refills: 1 | Status: SHIPPED | OUTPATIENT
Start: 2019-03-18 | End: 2019-04-17

## 2019-03-18 NOTE — TELEPHONE ENCOUNTER
Received message from pharmacy about leucovorin with bactrim.  Bactrim discontinued. Atovaquone 1500mg started for PCP prophylaxis.    Called pharmacy on file and spoke with pharmacist.  Pharmacist will notify patient (bactrim was not refilled) about the changes.

## 2019-03-18 NOTE — TELEPHONE ENCOUNTER
----- Message from Tico Joshi sent at 3/18/2019 10:15 AM CDT -----  Patient Requesting Order    Order Needed: swallow test    Communication Preference: [phone]    Additional Information: Pt said Home Health speech therapist is recommending pt complete this

## 2019-03-19 ENCOUNTER — INITIAL CONSULT (OUTPATIENT)
Dept: RADIATION ONCOLOGY | Facility: CLINIC | Age: 59
End: 2019-03-19
Payer: COMMERCIAL

## 2019-03-19 ENCOUNTER — TELEPHONE (OUTPATIENT)
Dept: GASTROENTEROLOGY | Facility: CLINIC | Age: 59
End: 2019-03-19

## 2019-03-19 VITALS
WEIGHT: 168.63 LBS | HEART RATE: 104 BPM | DIASTOLIC BLOOD PRESSURE: 67 MMHG | RESPIRATION RATE: 16 BRPM | SYSTOLIC BLOOD PRESSURE: 127 MMHG | TEMPERATURE: 99 F | HEIGHT: 65 IN | BODY MASS INDEX: 28.1 KG/M2

## 2019-03-19 DIAGNOSIS — C50.912 RECURRENT BREAST CANCER, LEFT: ICD-10-CM

## 2019-03-19 DIAGNOSIS — Z17.1 MALIGNANT NEOPLASM OF UPPER-OUTER QUADRANT OF LEFT BREAST IN FEMALE, ESTROGEN RECEPTOR NEGATIVE: Primary | Chronic | ICD-10-CM

## 2019-03-19 DIAGNOSIS — C50.412 MALIGNANT NEOPLASM OF UPPER-OUTER QUADRANT OF LEFT BREAST IN FEMALE, ESTROGEN RECEPTOR NEGATIVE: Primary | Chronic | ICD-10-CM

## 2019-03-19 PROCEDURE — 3074F SYST BP LT 130 MM HG: CPT | Mod: CPTII,S$GLB,, | Performed by: RADIOLOGY

## 2019-03-19 PROCEDURE — 99204 PR OFFICE/OUTPT VISIT, NEW, LEVL IV, 45-59 MIN: ICD-10-PCS | Mod: S$GLB,,, | Performed by: RADIOLOGY

## 2019-03-19 PROCEDURE — 3078F DIAST BP <80 MM HG: CPT | Mod: CPTII,S$GLB,, | Performed by: RADIOLOGY

## 2019-03-19 PROCEDURE — 3074F PR MOST RECENT SYSTOLIC BLOOD PRESSURE < 130 MM HG: ICD-10-PCS | Mod: CPTII,S$GLB,, | Performed by: RADIOLOGY

## 2019-03-19 PROCEDURE — 99999 PR PBB SHADOW E&M-EST. PATIENT-LVL III: ICD-10-PCS | Mod: PBBFAC,,, | Performed by: RADIOLOGY

## 2019-03-19 PROCEDURE — 99999 PR PBB SHADOW E&M-EST. PATIENT-LVL III: CPT | Mod: PBBFAC,,, | Performed by: RADIOLOGY

## 2019-03-19 PROCEDURE — 99204 OFFICE O/P NEW MOD 45 MIN: CPT | Mod: S$GLB,,, | Performed by: RADIOLOGY

## 2019-03-19 PROCEDURE — 3008F PR BODY MASS INDEX (BMI) DOCUMENTED: ICD-10-PCS | Mod: CPTII,S$GLB,, | Performed by: RADIOLOGY

## 2019-03-19 PROCEDURE — 3078F PR MOST RECENT DIASTOLIC BLOOD PRESSURE < 80 MM HG: ICD-10-PCS | Mod: CPTII,S$GLB,, | Performed by: RADIOLOGY

## 2019-03-19 PROCEDURE — 3008F BODY MASS INDEX DOCD: CPT | Mod: CPTII,S$GLB,, | Performed by: RADIOLOGY

## 2019-03-19 RX ORDER — SULFAMETHOXAZOLE AND TRIMETHOPRIM 800; 160 MG/1; MG/1
1 TABLET ORAL
COMMUNITY
End: 2019-03-25

## 2019-03-19 NOTE — PROGRESS NOTES
HISTORY OF PRESENT ILLNESS:   This patient presents for discussion of possible consolidative radiotherapy for recurrent breast cancer.      Ms. Verde's history dates back to January of 2017 when she presented for evaluation of a palpable mass in the UOQ of the Lt. breast.  Biopsy on 1/24/17 revealed infiltrating ductal carcinoma, histologic grade 3, nuclear grade 3 and mitotic index 3.  The tumor was ER, NE and Her-2 negative.  The patient presented to the [facility] for further evaluation.  Ultrasound revealed the mass measured 2.5 cm.  There was no axillary adenopathy.  She was recommended for neoadjuvant chemotherapy.  She did undergo sentinel node biopsy at the time of port placement.  Four sentinel nodes were negative for tumor involvement. She received neoadjuvant chemotherapy with 4 cycles of docetaxel and Cytoxan.  On 6/26/17 the patient underwent Lt. mastectomy with SCOTT flap reconstruction.  Pathology from the Lt. breast revealed 2 foci of invasive ductal carcinoma, 14 mm and 1.5 mm.  Both histologic grade 3, nuclear grade 3 and mitotic index 2.  There was no associated DCIS.  The margins of resection were negative.  The closest margin was superior at 5 mm.  Postoperatively, the patient received adjuvant single agent Adriamycin for 4 cycles which she completed in September of 2017.  She did well until October of 2018 when she presented with a 2.5 cm lt. supraclavicular lymph node and a more posterior 1 cm supraclavicular node.  Ultrasound confirmed the findings along with a level 2 subpectoral node.  FNA was positive for adenocarcinoma consistent with breast primary.  The tumor was ER, NE and Her-2 negative.  Ki-67 was 10%.  Further work up with PET scan on 10/11/18 revealed multiple enlarged left-sided hypermetabolic nodes involving the left lower cervical/supraclavicular chain, infraclavicular region, and retropectoral region, SUV max 27.2.  There was no evidence of distant metastatic  disease.  The patient began chemo/immunotherapy with abraxane and atelizumab for 3 cycles.  She was admitted for 22 days with dermatomyositis requiring high dose steroids and MTX.  She was discharged on 19.  Of note repeat PET scan on 18 revealed significant improvement in all the previously seen lymph nodes involving the Lt. lower neck, supraclavicular region and axilla.  CT of the neck and chest on 1/15/19 confirmed the findings with numerous upper limit normal sized nodes.  The patient now presents for discussion of possible consolidative radiotherapy.  Today, the patient states she feel fairly well.  Still recovering from recent admission.  Activity is increasing     REVIEW OF SYSTEMS:   Review of Systems   Constitutional: Positive for malaise/fatigue. Negative for chills, diaphoresis, fever and weight loss.   HENT: Negative for congestion and sore throat.    Respiratory: Negative for cough, hemoptysis and shortness of breath.    Cardiovascular: Negative for chest pain, palpitations and orthopnea.   Gastrointestinal: Negative for abdominal pain, nausea and vomiting.   Genitourinary: Negative for dysuria, frequency and urgency.   Neurological: Positive for weakness.       GYN HISTORY:  Age of menarche was 11.  Last menstrual period was 2016. Patient denies hormonal therapy. Patient is . Age of first live birth was 21. Patient did breast feed for ~3 months per child.  Family history is positive for a sister with breast cancer at age 52 - 53 status post bilateral mastectomies.  Genetic testing was negative.         PAST MEDICAL HISTORY:  Past Medical History:   Diagnosis Date    Breast cancer 2017    left    Depression     Diverticulosis     Gastritis     Hypertension     Vitamin B12 deficiency 3/8/2018       PAST SURGICAL HISTORY:  Past Surgical History:   Procedure Laterality Date    BIOPSY-SENTINEL NODE Left 2017    Performed by [facility]o English [facility] at [facility] OR    BREAST BIOPSY  Left     BREAST RECONSTRUCTION Left 2017     SECTION      COLONOSCOPY  2011    repeat in 10 yrs.    D&C      EGD (ESOPHAGOGASTRODUODENOSCOPY) N/A 2019    Performed by [facility]ernell [facility]as [facility] at [facility] (2ND FLR)    INSERTION, PEG TUBE N/A 2019    Performed by Zurdo Gordon [facility] at [facility] END[facility] (2ND FLR)    KHAWHVCPF-SXGE-I-CATH Right 10/31/2018    Performed by Deo Palencia [facility] at [facility] OR 2ND FLR    WHNIMIOLZ-DMYY-E-CATH Right 2017    [facility]    YRDHLSJXS-FLEK-M-CATH-neck or chest Fluoro needed Consent AM of surgery Right 10/30/2018    Performed by [facility] at [facility] OR 2ND FLR    MASTECTOMY Left 2017    MASTECTOMY Left 2017    [facility] at [facility] OR    MYOMECTOMY      PORTACATH PLACEMENT      RECONSTRUCTION-BREAST/FLAP-SCOTT Left 2017    Performed by [facility]jinder Sewell [facility] at [facility] OR    SENTINEL LYMPH NODE BIOPSY  2017    left    TOTAL REDUCTION MAMMOPLASTY Right 2017       ALLERGIES:   Review of patient's allergies indicates:  No Known Allergies    MEDICATIONS:  Current Outpatient Medications   Medication Sig    acetaminophen (TYLENOL) 325 MG tablet 650 mg by Tube route every 4 (four) hours as needed for Pain.    atovaquone (MEPRON) 750 mg/5 mL Susp Take 10 mLs (1,500 mg total) by mouth once daily.    escitalopram oxalate (LEXAPRO) 10 MG tablet 1 tablet (10 mg total) by Per G Tube route once daily.    famotidine (PEPCID) 20 MG tablet 1 tablet (20 mg total) by Per G Tube route 2 (two) times daily.    fluticasone (FLONASE) 50 mcg/actuation nasal spray SHAKE LIQUID AND USE 1 SPRAY(50 MCG) IN EACH NOSTRIL EVERY DAY    folic acid (FOLVITE) 1 MG tablet 4 tablets (4 mg total) by Per G Tube route once daily.    guaifenesin 100 mg/5 ml (ROBITUSSIN) 100 mg/5 mL syrup 200 mg by Tube route every 4 (four) hours as needed for Congestion.    leucovorin (WELLCOVORIN) 5 mg Tab Take 1 tablet (5 mg total)  by mouth once daily.    methylPREDNISolone (MEDROL) 16 MG Tab Take 2 tablets (32 mg total) by mouth once daily.    pantoprazole (PROTONIX) 40 mg GrPS 40 mg by Tube route 2 (two) times daily.    senna-docusate 8.6-50 mg (PERICOLACE) 8.6-50 mg per tablet 2 tablets by Tube route daily with lunch.    vitamin D (VITAMIN D3) 1000 units Tab 2,000 Units by Tube route once daily.     No current facility-administered medications for this visit.        SOCIAL HISTORY:  Social History     Socioeconomic History    Marital status:      Spouse name: Not on file    Number of children: Not on file    Years of education: Not on file    Highest education level: Not on file   Social Needs    Financial resource strain: Not on file    Food insecurity - worry: Not on file    Food insecurity - inability: Not on file    Transportation needs - medical: Not on file    Transportation needs - non-medical: Not on file   Occupational History    Occupation: banking   Tobacco Use    Smoking status: Never Smoker    Smokeless tobacco: Never Used   Substance and Sexual Activity    Alcohol use: Yes     Comment: wine    Drug use: No    Sexual activity: Yes     Partners: Male     Birth control/protection: Post-menopausal   Other Topics Concern    Not on file   Social History Narrative    Not on file       FAMILY HISTORY:  Family History   Problem Relation Age of Onset    Heart disease Father     Hypertension Father     Breast cancer Sister 52    Hypertension Mother     No Known Problems Brother     Thyroid disease Daughter         hyperthyroidism s/p thyroidectomy    No Known Problems Son     No Known Problems Brother     No Known Problems Brother     No Known Problems Sister     No Known Problems Daughter     Colon cancer Neg Hx     Ovarian cancer Neg Hx          PHYSICAL EXAMINATION:  There were no vitals filed for this visit.  Physical Exam   Constitutional: She is well-developed, well-nourished, and in no  distress.   HENT:   Head: Normocephalic.   Eyes: EOM are normal. Pupils are equal, round, and reactive to light.   Neck: Normal range of motion. Neck supple.   Pulmonary/Chest:       Lymphadenopathy:     She has no cervical adenopathy.     She has no axillary adenopathy.        Right: No supraclavicular adenopathy present.        Left: No supraclavicular adenopathy present.     Radiology  - I reviewed the images from her most recent PET scan on 3/4/19    ASSESSMENT/PLAN:  Recurrent Lt. breast cancer   ECO    I had a long discussion with the patient and her .  Explained that based on the fact that she presented with regional only recurrence and her excellent response to therapy, we would recommend a course of radiotherapy to the Lt. reconstructed breast and regional alana areas.  Recommended 50.4 Gy at 1.8 Gy per fraction.  We discussed the procedures, risks and benefits of therapy.  Discussed the acute and long term side effects including lung fibrosis, increased cardiac events and lymphedema.  The patient was agreeable to proceeding with therapy.  Will plan simulation with treatment to begin thereafter.     Psychosocial Distress screening score of Distress Score: 4 noted and reviewed. No intervention indicated.    I spent approximately 45 minutes reviewing the available records and evaluating the patient, out of which over 50% of the time was spent face to face with the patient in counseling and coordinating this patient's care.

## 2019-03-19 NOTE — TELEPHONE ENCOUNTER
Spoke with patient. She states her discharge instructions told her to follow up with GI. She denies any GI symptoms and no problems with PEG. She reports PCP is ordering swallow study to be done soon. Instructed patient that she does not need a follow up appt: PCP will monitor swallow study and progression. Instructed patient to call for appt if any GI symptoms problem with the PEG or when ready for PEG to be removed.verbalized understanding.

## 2019-03-19 NOTE — TELEPHONE ENCOUNTER
----- Message from Ernestine Dyer sent at 3/19/2019 11:33 AM CDT -----  Contact: Self- [phone]  Pt states she is returning your call to schedule hospital discharge f/u appt- please contact pt at [phone]

## 2019-03-19 NOTE — TELEPHONE ENCOUNTER
----- Message from Eryn Hardin MA sent at 3/18/2019  2:34 PM CDT -----  Contact: self   Please schedule with anyone,   Thanks  ----- Message -----  From: Zurdo Gordon MD  Sent: 3/18/2019   1:38 PM  To: Eryn Hardin MA    General GI  Zurdo Gordon MD  ----- Message -----  From: Eryn Hardin MA  Sent: 3/18/2019  12:50 PM  To: Zurdo Gordon MD     Should this be General GI?  ----- Message -----  From: Reta Reyes  Sent: 3/18/2019  10:58 AM  To: Evan gordon    Needs Advice    Reason for call:states she saw evan in hospital - calling for f/u        Communication Preference: [phone]    Additional Information:

## 2019-03-19 NOTE — LETTER
March 19, 2019      Alex Reyna MD  [address]  [facility] [address]           [facility] Radiation On[facility]  [address]  [facility] [address]  Phone: [phone]          Patient: Ermelinda Verde   MR Number: [id number]   YOB: 1960   Date of Visit: 3/19/2019       Dear Dr. Alex Reyna:    Thank you for referring Ermelinda Verde to me for evaluation. Attached you will find relevant portions of my assessment and plan of care.    If you have questions, please do not hesitate to call me. I look forward to following Ermelinda Verde along with you.    Sincerely,    Jose Lopez Jr., MD    Enclosure  CC:  No Recipients    If you would like to receive this communication electronically, please contact [email or web address] or [phone] to request more information on [employer] access.    For providers and/or their staff who would like to refer a patient to [facility], please contact us through our one-stop-shop provider referral line, [facility], at [phone].    If you feel you have received this communication in error or would no longer like to receive these types of communications, please e-mail [email or web address]

## 2019-03-22 ENCOUNTER — HOSPITAL ENCOUNTER (OUTPATIENT)
Dept: RADIATION THERAPY | Facility: HOSPITAL | Age: 59
Discharge: HOME | End: 2019-03-22
Attending: RADIOLOGY
Payer: COMMERCIAL

## 2019-03-22 PROCEDURE — 77263 THER RADIOLOGY TX PLNG CPLX: CPT | Mod: ,,, | Performed by: RADIOLOGY

## 2019-03-22 PROCEDURE — 77290 THER RAD SIMULAJ FIELD CPLX: CPT | Mod: TC | Performed by: RADIOLOGY

## 2019-03-22 PROCEDURE — 77290 THER RAD SIMULAJ FIELD CPLX: CPT | Mod: 26,,, | Performed by: RADIOLOGY

## 2019-03-22 PROCEDURE — 77334 RADIATION TREATMENT AID(S): CPT | Mod: 26,,, | Performed by: RADIOLOGY

## 2019-03-22 PROCEDURE — 77334 RADIATION TREATMENT AID(S): CPT | Mod: TC | Performed by: RADIOLOGY

## 2019-03-22 PROCEDURE — 77334 PR  RADN TREATMENT AID(S) COMPLX: ICD-10-PCS | Mod: 26,,, | Performed by: RADIOLOGY

## 2019-03-22 PROCEDURE — 77263 PR  RADIATION THERAPY PLAN COMPLEX: ICD-10-PCS | Mod: ,,, | Performed by: RADIOLOGY

## 2019-03-22 PROCEDURE — 77014 HC CT GUIDANCE RADIATION THERAPY FLDS PLACEMENT: CPT | Mod: TC | Performed by: RADIOLOGY

## 2019-03-22 PROCEDURE — 77290 PR  SET RADN THERAPY FIELD COMPLEX: ICD-10-PCS | Mod: 26,,, | Performed by: RADIOLOGY

## 2019-03-25 ENCOUNTER — OFFICE VISIT (OUTPATIENT)
Dept: INTERNAL MEDICINE | Facility: CLINIC | Age: 59
End: 2019-03-25
Payer: COMMERCIAL

## 2019-03-25 VITALS
WEIGHT: 166.13 LBS | HEART RATE: 106 BPM | DIASTOLIC BLOOD PRESSURE: 70 MMHG | HEIGHT: 65 IN | OXYGEN SATURATION: 99 % | TEMPERATURE: 98 F | SYSTOLIC BLOOD PRESSURE: 118 MMHG | BODY MASS INDEX: 27.68 KG/M2

## 2019-03-25 DIAGNOSIS — R29.898 UPPER EXTREMITY WEAKNESS: ICD-10-CM

## 2019-03-25 DIAGNOSIS — Z09 HOSPITAL DISCHARGE FOLLOW-UP: Primary | ICD-10-CM

## 2019-03-25 DIAGNOSIS — R01.1 NEWLY RECOGNIZED HEART MURMUR: ICD-10-CM

## 2019-03-25 DIAGNOSIS — E53.8 FOLATE DEFICIENCY: ICD-10-CM

## 2019-03-25 DIAGNOSIS — C50.412 MALIGNANT NEOPLASM OF UPPER-OUTER QUADRANT OF LEFT FEMALE BREAST, UNSPECIFIED ESTROGEN RECEPTOR STATUS: Chronic | ICD-10-CM

## 2019-03-25 DIAGNOSIS — K25.9 GASTRIC ULCER, UNSPECIFIED CHRONICITY, UNSPECIFIED WHETHER GASTRIC ULCER HEMORRHAGE OR PERFORATION PRESENT: ICD-10-CM

## 2019-03-25 DIAGNOSIS — R29.898 WEAKNESS OF BOTH LOWER EXTREMITIES: ICD-10-CM

## 2019-03-25 DIAGNOSIS — M33.13 DERMATOMYOSITIS: ICD-10-CM

## 2019-03-25 DIAGNOSIS — K22.10 EROSIVE ESOPHAGITIS: ICD-10-CM

## 2019-03-25 DIAGNOSIS — D64.9 NORMOCYTIC ANEMIA: ICD-10-CM

## 2019-03-25 PROCEDURE — 99999 PR PBB SHADOW E&M-EST. PATIENT-LVL IV: ICD-10-PCS | Mod: PBBFAC,,, | Performed by: INTERNAL MEDICINE

## 2019-03-25 PROCEDURE — 99214 OFFICE O/P EST MOD 30 MIN: CPT | Mod: S$GLB,,, | Performed by: INTERNAL MEDICINE

## 2019-03-25 PROCEDURE — 99999 PR PBB SHADOW E&M-EST. PATIENT-LVL IV: CPT | Mod: PBBFAC,,, | Performed by: INTERNAL MEDICINE

## 2019-03-25 PROCEDURE — 99214 PR OFFICE/OUTPT VISIT, EST, LEVL IV, 30-39 MIN: ICD-10-PCS | Mod: S$GLB,,, | Performed by: INTERNAL MEDICINE

## 2019-03-25 RX ORDER — OMEPRAZOLE 40 MG/1
40 CAPSULE, DELAYED RELEASE ORAL
Qty: 60 CAPSULE | Refills: 11 | Status: SHIPPED | OUTPATIENT
Start: 2019-03-25 | End: 2019-06-27 | Stop reason: SDUPTHER

## 2019-03-25 NOTE — PROGRESS NOTES
Transitional Care Note  Subjective:       Patient ID: Ermelinda Verde is a 58 y.o. female.  Chief Complaint: Hospital Follow Up    Family and/or Caretaker present at visit?  Yes, .  Diagnostic tests reviewed/disposition: No diagnosic tests pending after this hospitalization.  Disease/illness education: yes  Home health/community services discussion/referrals: Patient has home health established at [facility] - PT/OT/ST. .   Establishment or re-establishment of referral orders for community resources: No other necessary community resources.   Discussion with other health care providers: No discussion with other health care providers necessary.     HPI   Pt is well known to me. Has recurrent breast CA 10/2018 and undergoing chemo. Started having muscle weakness B. Pt was also having a rash that was initially attributed to chemo. Presented to hospital and found to have rhabdo.  Hospitalized 1/22 through 2/13/19.   Started on high dose IV steroids and IV fluids. S/p skin biopsy consistently w/ dermatomyositis. Developed dysphagia due to candida esophagitis and was started on fluconazole. Failed MBSS and s/p PEG tube 2/7/19. Started on MTX and IVIG x5.   EGD - bx 1/29/19 - chronic inflammation w/o erosions. EGD 1/29/19 - LA grade C erosive esophagitis. Small hiatal hernia. Repeat EGD 2/7/19 - no gross lesions in esophagus. Gastric mucosal atrophy. nonbleeding gastric ulcer w/ clean ulcer base. No gross lesions in the entire examined duodenum. PEG in place.  Skin BX 1/24/19 - dermatomyositis.  Anemia requiring 1 u pRBCs transfusion.   Pt was evaluated by PT/OT and recommended for rehab. Discharged on high dose steroids and MTX.     In Rehab, blood counts went down and liver enzymes went up so stopped on MTX x 1 mo. Folic acid was increased to 4mg daily.     Discharged from rehab 2/28.    Since discharge - followed w/ rheum - Dr. Swift and Dr. Campos.   Recently stopped on Bactrim and started on atovaquone  "1500mg for PCP proph and started on leucovorin 5mg daily.     Currently on medrol 32mg daily, leucovorin 5mg daily, atovaquone 1500mg daily, pepcid. Couldn't afford protonix as it was $5000.    Hasn't taken lexapro 10mg daily for adjustment d/o since she's been home from the hospital. Reports feeling ok.     Pt reports has been eating soft diet for the past week. Swallow study 3/28/19. PEG tube is still in place. No diarrhea/constipation. Does take stool softener every other day just in case.     Plans for upcoming radiation.     Labs reviewed - CPK 3/4/19. AST improving - 56 3/4/19. WBC and Hgb low. PLT normalized. Hgb 9.9.     Review of Systems   Constitutional: Negative for chills and fever.   HENT: Negative.    Respiratory: Negative for cough, shortness of breath and wheezing.    Cardiovascular: Negative for chest pain, palpitations and leg swelling.   Gastrointestinal: Negative for abdominal pain, blood in stool, constipation, diarrhea, nausea and vomiting.   Genitourinary: Negative.    Musculoskeletal: Negative.    Skin: Positive for rash.   Neurological: Positive for weakness (improving. was not able to pick arms up prior to hosp, now able to do that. ). Negative for dizziness, numbness and headaches.   Psychiatric/Behavioral: Negative for dysphoric mood. The patient is not nervous/anxious.        Objective:      Physical Exam    /70   Pulse 106   Temp 98.2 °F (36.8 °C)   Ht 5' 5" (1.651 m)   Wt 75.4 kg (166 lb 1.9 oz)   LMP  (LMP Unknown)   SpO2 99%   BMI 27.64 kg/m²     gEN - a+ox4, nad  heent - perrl, op clear. MMM.   Neck - no LAD  CV - RRR, II/VI GLORIA best at LUSB.   CHEST - CTAB, no wheezing/rhonchi  Abd- S/NT/ND/+BS. Peg in place w/o any erythema, pain or drainage.   Ext -2 + B DP and radial pulses. No LE edema.   MSK - 3-4/5 B hip flexion. 5/5 B knee and ankle flexion/extension. 4/4 B shoulder abduction but 5/5 B elbow and wrist flexion/extension. Normal gait decreased DTRs B.   Skin - " hyperpigmented flat macules of BUE including hands and BLE. PEG site as above.   Neuro - as above.     MRI HUMERUS 1/23/19  Impression     1. Diffuse edema and enhancement of the visualized left upper extremity musculature, most pronounced proximally, most notably of the deltoid and biceps musculature as detailed above.  Findings are nonspecific although could relate to a nonspecific myelitis particularly in light of patient's reported history.  Clinical correlation with appropriate history and lab markers advised.  2. No evidence of abscess or osteomyelitis.  This report was flagged in Epic as abnormal.       PET CT 3/4/19  FINDINGS:  Quality of the study: Adequate.    There has been complete resolution left lower neck, supraclavicular and axillary activity with resolution of abnormal radiotracer uptake of the index left retropectoral lymph node.    Postprocedural hypermetabolic activity is present along the new gastrostomy tube tract.    Physiologic uptake of the tracer is present within the brain, salivary glands, myocardium, GI and  tracts.    Incidental CT findings: Port in the right anterior chest wall with catheter tip extending into the left brachiocephalic vein, as seen on prior chest radiograph.  Postsurgical changes of left mastectomy and reconstruction.  Gastrostomy tube is within the stomach.  Large lobular uterus containing dystrophic calcification likely relating to uterine fibroids.      Impression       Continued favorable response to therapy with complete resolution of hypermetabolic alana activity.         Assessment/Plan       Ermelinda was seen today for hospital follow up.    Diagnoses and all orders for this visit:    Hospital discharge follow-up - couldn't afford protonix. Trial of omeprazole BID. If too expensive, pt to let me know and I can send in esomeprazole. Will need f/u EGD in a few mo to make sure ulcer heals.  -     omeprazole (PRILOSEC) 40 MG capsule; Take 1 capsule (40 mg total) by  mouth 2 (two) times daily before meals.    Erosive esophagitis - as above.   -     Folate; Future; Expected date: 03/25/2019  -     omeprazole (PRILOSEC) 40 MG capsule; Take 1 capsule (40 mg total) by mouth 2 (two) times daily before meals.    Gastric ulcer, unspecified chronicity, unspecified whether gastric ulcer hemorrhage or perforation present - as above.   -     omeprazole (PRILOSEC) 40 MG capsule; Take 1 capsule (40 mg total) by mouth 2 (two) times daily before meals.    Folate deficiency - pt was on folic acid 4mg daily due to possible MTX toxicity. Off MTX x 1 mo now. Recheck folate. If high, likely can stop.   -     Folate; Future; Expected date: 03/25/2019    Normocytic anemia - anemia improving. S/p 1 u pRBCs while in hospital in Feb.    Upper extremity weakness - improving but still w/ some shoulder weakness.   -     Ambulatory consult to Physical Therapy    Weakness of both lower extremities - improving but still w/ B hip weakness. Refer to outpt PT.   -     Ambulatory consult to Physical Therapy    Dermatomyositis - currently on prednisone. Wean as tolerated w/ rheum. F/u as scheduled. Weakness improving. CPK normalized. Glucose ok 3/4/19. Will reassess in 3 mo as hopefully she'll go back to regular diet once she passes the upcoming swallow study.  -     Ambulatory consult to Physical Therapy    Malignant neoplasm of upper-outer quadrant of left female breast, unspecified estrogen receptor status - plan for radiation.   -     Transthoracic echo (TTE) complete (Cupid Only); Future    New recognized heart murmur - TTE.     F/u in 3 mo, sooner if needed.     Reta Weeks MD  Department of Internal Medicine - [facility]  10:20 AM

## 2019-03-27 ENCOUNTER — TELEPHONE (OUTPATIENT)
Dept: RADIOLOGY | Facility: HOSPITAL | Age: 59
End: 2019-03-27

## 2019-03-28 ENCOUNTER — CLINICAL SUPPORT (OUTPATIENT)
Dept: SPEECH THERAPY | Facility: HOSPITAL | Age: 59
End: 2019-03-28
Attending: PHYSICAL MEDICINE & REHABILITATION
Payer: COMMERCIAL

## 2019-03-28 ENCOUNTER — HOSPITAL ENCOUNTER (OUTPATIENT)
Dept: RADIOLOGY | Facility: HOSPITAL | Age: 59
Discharge: HOME | End: 2019-03-28
Attending: PHYSICAL MEDICINE & REHABILITATION
Payer: COMMERCIAL

## 2019-03-28 DIAGNOSIS — R13.12 OROPHARYNGEAL DYSPHAGIA: ICD-10-CM

## 2019-03-28 DIAGNOSIS — E44.0 MODERATE MALNUTRITION: ICD-10-CM

## 2019-03-28 DIAGNOSIS — R13.19 ESOPHAGEAL DYSPHAGIA: ICD-10-CM

## 2019-03-28 PROCEDURE — 74230 X-RAY XM SWLNG FUNCJ C+: CPT | Mod: TC

## 2019-03-28 PROCEDURE — 77301 PR  INTEN MOD RADIOTHER PLAN W/DOSE VOL HIST: ICD-10-PCS | Mod: 26,,, | Performed by: RADIOLOGY

## 2019-03-28 PROCEDURE — 77301 RADIOTHERAPY DOSE PLAN IMRT: CPT | Mod: TC | Performed by: RADIOLOGY

## 2019-03-28 PROCEDURE — 77301 RADIOTHERAPY DOSE PLAN IMRT: CPT | Mod: 26,,, | Performed by: RADIOLOGY

## 2019-03-28 PROCEDURE — 74230 X-RAY XM SWLNG FUNCJ C+: CPT | Mod: 26,,, | Performed by: RADIOLOGY

## 2019-03-28 PROCEDURE — 74230 FL MODIFIED BARIUM SWALLOW SPEECH STUDY: ICD-10-PCS | Mod: 26,,, | Performed by: RADIOLOGY

## 2019-03-28 PROCEDURE — 92611 MOTION FLUOROSCOPY/SWALLOW: CPT | Mod: GN | Performed by: SPEECH-LANGUAGE PATHOLOGIST

## 2019-03-28 NOTE — PATIENT INSTRUCTIONS
Recommendations / POC   -Diet: recommend mechanical soft diet; regular thin liquids ok  -Therapeutic technique: recommend small bites/sips, alternate solid with liquid wash, head turn to right OR left + repeat swallow, and multiple swallows per bolus   -Continue HH dysphagia therapy, for 3-6 sessions over the course of 1-4 weeks, in order to increase pharyngeal contraction, increase swallow safety by implementing therapeutic maneuvers and decrease dependence on PEG and increase oral intake of food/liquid  -Contact clinician or referring provider for repeat MBSS if swallowing status changes or worsens    Discussed recommendations with patient.  If diet orders are approved by primary team, discussed trajectory of decreasing PEG dependence to work toward full PO diet.  In general, we recommend slowly weaning PEG feeds (decrease by 1-2 cans per week) to determine if pt is able to safely and efficiently maintain weight with gradually increased PO diet and decreased PEG nutrition.  After weaning off PEG to full PO diet, if pt has been able to safely and efficiently maintain nutrition for one month, consider removing PEG per primary team.  Encouraged pt to seek out a dietician/nutrition discuss optimal caloric intake.

## 2019-03-28 NOTE — PROGRESS NOTES
Referring provider: Dr. Larry Velez  Reason for visit:  Modified barium swallow study (CPT 79006)    Report Summary   --Date: 03/28/2019  --Purpose: to evaluate anatomy and physiology of the oropharyngeal swallow, to determine effectiveness of rehabilitation strategies, and to determine diet consistency and intervention recommendations.   --Diet recommendations: mechanical soft diet; regular thin liquids ok    Subjective / History    Ermelinda Verde is a 58 y.o. female referred by Dr. Velez for Modified Barium Swallow Study (63699).  She is currently on a pureed diet and has been tolerating regular liquids.  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  She is currently tolerating soft foods and liquids, but still takes about 6 cans via PEG daily.  She has been maintaining her weight.  Per medical record, pt presented to ED as a direct admit from clinic with proximal upper extremity muscle weakness suspicious for myositis due to immunotherapy. She was started on high dose steroids and IVF.  Rheumatology consulted and started patient on Methylprednisolone  mg BID.  Derm consulted for skin punch biopsy, the results showed VACUOLAR INTERFACE DERMATITIS, CONSISTENT WITH DERMATOMYOSITIS.  She was having dysphagia possibly 2/2 to candidal esophagitis and was started on fluconazole with some improvement. Underwent modified barium study which was concerning for high risk of aspiration and the pt was made strict NPO and ultimately required a PEG tube placement on 02/07/2019 and tolerated well with tube feeds at goal.  GI consulted, EGD resulted w/ Grade C erosive esophagitis, no candidal infxn identified. Bx obtained (Very mild, nonspecific chronic inflammation without eosinophils exhibiting slight elongation of vascular papillae.  She was then discharged to rehab facility to receive oral Methotrexate and PT/OT/ST.  Since d/c from rehab has been getting home  "health and reports that swallowing has improved significantly since her prior two MBSS.      Most recent MBS results (2/6/19): "Pt with moderate oral phase dysphagia and severe-profound pharyngeal phase dysphagia characterized by unappreciated hyolaryngeal excursion/ elevation, epiglottic inversion, and pharyngeal contraction resulting in aspiration of thin liquid and highly suspected aspiration of pureed solid."    Past Medical History:   Diagnosis Date    Breast cancer 01/01/2017    left    Depression     Diverticulosis     Gastritis     Hypertension     Vitamin B12 deficiency 3/8/2018     Current Outpatient Medications on File Prior to Visit   Medication Sig Dispense Refill    acetaminophen (TYLENOL) 325 MG tablet 650 mg by Tube route every 4 (four) hours as needed for Pain.      atovaquone (MEPRON) 750 mg/5 mL Susp Take 10 mLs (1,500 mg total) by mouth once daily. 300 mL 1    fluticasone (FLONASE) 50 mcg/actuation nasal spray SHAKE LIQUID AND USE 1 SPRAY(50 MCG) IN EACH NOSTRIL EVERY DAY 16 mL 5    folic acid (FOLVITE) 1 MG tablet 4 tablets (4 mg total) by Per G Tube route once daily. 120 tablet 0    leucovorin (WELLCOVORIN) 5 mg Tab Take 1 tablet (5 mg total) by mouth once daily. 30 tablet 1    methylPREDNISolone (MEDROL) 16 MG Tab Take 2 tablets (32 mg total) by mouth once daily. 60 tablet 0    omeprazole (PRILOSEC) 40 MG capsule Take 1 capsule (40 mg total) by mouth 2 (two) times daily before meals. 60 capsule 11    senna-docusate 8.6-50 mg (PERICOLACE) 8.6-50 mg per tablet 2 tablets by Tube route daily with lunch.      vitamin D (VITAMIN D3) 1000 units Tab 2,000 Units by Tube route once daily.       No current facility-administered medications on file prior to visit.        Objective   Lateral videofluorographic views were obtained. The radiologist and speech pathologist were present for the entire procedure.     Consistencies assessed / method of administration  Thin liquid/4 mL  Thin " liquid/6 mL  Thin liquid/cup sips  Applesauce/tsp  Pudding/tsp  Cracker  Barium tablet w/ water    Oral phase  - Grossly WNL with adequate/timely lip closure, tongue control during bolus hold, bolus preparation, and bolus transport/lingual motion.  Little to no oral residue.      Pharyngeal phase  - Delayed initiation: to valleculae, pyriforms  - Adequate soft palate elevation  - Slightly reduced laryngeal elevation and anterior hyoid excursion  - Adequate tongue base retraction  - Reduced epiglottic movement  - Adequate laryngeal vestibular closure  - Adequate pharyngeal stripping wave  - Mild-moderate pharyngeal residue: valleculae, pyriforms.  Cleared most effectively with head turn w/ repeat swallow.  Some improvement with neutral-posture repeat swallow.   - Unobstructed PE segment opening: slight prominence of UES which did not appear to significantly obstruct bolus flow    Strategies/therapeutic interventions attempted  - Chin tuck:  Strategies were not effective in decreasing/eliminating risk for residue.   - Head turn to right or to left + swallow:  Strategies were effective in decreasing/eliminating risk for vallecular and pyriform residue.  (both sides equally effective)  - Multiple swallows per bolus (w/o head turn):  Strategies were intermittently effective in decreasing/eliminating risk for pharyngeal residue.     Rosenbek Penetration-Aspiration Scale (Rosenbek et al 1996)  Best Trial: 1. Contrast does not enter airway.   Worst Trial: 2. Contrast enters airway but remains above TVF, no residue.     Functional goals  Length Status Goal   Long term Ongoing Patient will maintain adequate hydration/nutrition with optimum safety and efficiency of swallowing function on least restrictive PO diet level without overt signs and symptoms of aspiration.    Long term Ongoing Patient will utilize compensatory strategies with optimum safety and efficiency of swallowing function on least restrictive PO diet level  without overt signs and symptoms of aspiration.    Short term Ongoing Patient will demonstrate diet upgrade trials without s/s of aspiration with 10/10 trials.    Short term Ongoing Patient will independently demonstrate adequate use of head turn/repeat swallow technique to eliminate s/s of aspiration with consistency on 8/10 trials.   Short term Ongoing Patient will correctly demonstrate pharyngeal swallow strengthening exercises on 10/10 trials.   Short term Ongoing Patient will demonstrate the ability to adequately self-monitor swallowing skills and perform appropriate compensatory techniques to reduce s/s of aspiration.        Assessment / Impressions   The results of this MBSS indicate that patient demonstrates mild swallowing dysfunction c/b mild-moderate pharyngeal residue after the swallow; this could be cleared with repeat swallows and head turn+swallows.  No aspiration observed.      Recommendations / POC   -Diet: recommend mechanical soft diet; regular thin liquids ok  -Therapeutic technique: recommend small bites/sips, alternate solid with liquid wash, head turn to right OR left + repeat swallow, and multiple swallows per bolus   -Continue HH dysphagia therapy, for 3-6 sessions over the course of 1-4 weeks, in order to increase pharyngeal contraction, increase swallow safety by implementing therapeutic maneuvers and decrease dependence on PEG and increase oral intake of food/liquid  -Contact clinician or referring provider for repeat MBSS if swallowing status changes or worsens    Discussed recommendations with patient.  If diet orders are approved by primary team, discussed trajectory of decreasing PEG dependence to work toward full PO diet.  In general, we recommend slowly weaning PEG feeds (decrease by 1-2 cans per week) to determine if pt is able to safely and efficiently maintain weight with gradually increased PO diet and decreased PEG nutrition.  After weaning off PEG to full PO diet, if pt has  been able to safely and efficiently maintain nutrition for one month, consider removing PEG per primary team.  Encouraged pt to seek out a dietician/nutrition discuss optimal caloric intake.

## 2019-03-29 ENCOUNTER — DOCUMENTATION ONLY (OUTPATIENT)
Dept: RADIATION ONCOLOGY | Facility: CLINIC | Age: 59
End: 2019-03-29

## 2019-03-29 PROCEDURE — 77300 PR RADIATION THERAPY,DOSIMETRY PLAN: ICD-10-PCS | Mod: 26,,, | Performed by: RADIOLOGY

## 2019-03-29 PROCEDURE — 77338 PR  MLC IMRT DESIGN & CONSTRUCTION PER IMRT PLAN: ICD-10-PCS | Mod: 26,,, | Performed by: RADIOLOGY

## 2019-03-29 PROCEDURE — 77300 RADIATION THERAPY DOSE PLAN: CPT | Mod: TC | Performed by: RADIOLOGY

## 2019-03-29 PROCEDURE — 77300 RADIATION THERAPY DOSE PLAN: CPT | Mod: 26,,, | Performed by: RADIOLOGY

## 2019-03-29 PROCEDURE — 77338 DESIGN MLC DEVICE FOR IMRT: CPT | Mod: TC | Performed by: RADIOLOGY

## 2019-03-29 PROCEDURE — 77417 THER RADIOLOGY PORT IMAGE(S): CPT | Performed by: RADIOLOGY

## 2019-03-29 PROCEDURE — 77338 DESIGN MLC DEVICE FOR IMRT: CPT | Mod: 26,,, | Performed by: RADIOLOGY

## 2019-03-29 PROCEDURE — 77387 GUIDANCE FOR RADJ TX DLVR: CPT | Mod: 26,,, | Performed by: RADIOLOGY

## 2019-03-29 PROCEDURE — 77387 PR GUIDANCE FOR RADIATION TREATMENT DELIVERY: ICD-10-PCS | Mod: 26,,, | Performed by: RADIOLOGY

## 2019-03-29 NOTE — PLAN OF CARE
Problem: Adult Inpatient Plan of Care  Goal: Plan of Care Review  Outcome: Ongoing (interventions implemented as appropriate)  First day of outpatient XRT to the left breast. Nursing education done. Breast handout and Miaderm given. Pt verbalized understanding.

## 2019-04-01 ENCOUNTER — INFUSION (OUTPATIENT)
Dept: INFUSION THERAPY | Facility: HOSPITAL | Age: 59
End: 2019-04-01
Attending: INTERNAL MEDICINE
Payer: COMMERCIAL

## 2019-04-01 ENCOUNTER — HOSPITAL ENCOUNTER (OUTPATIENT)
Dept: RADIATION THERAPY | Facility: HOSPITAL | Age: 59
Discharge: HOME | End: 2019-04-01
Attending: RADIOLOGY
Payer: COMMERCIAL

## 2019-04-01 VITALS
BODY MASS INDEX: 28.32 KG/M2 | HEART RATE: 88 BPM | WEIGHT: 170 LBS | DIASTOLIC BLOOD PRESSURE: 66 MMHG | SYSTOLIC BLOOD PRESSURE: 117 MMHG | RESPIRATION RATE: 18 BRPM | TEMPERATURE: 99 F | HEIGHT: 65 IN

## 2019-04-01 DIAGNOSIS — M33.13 DERMATOMYOSITIS: Primary | ICD-10-CM

## 2019-04-01 DIAGNOSIS — R13.19 ESOPHAGEAL DYSPHAGIA: ICD-10-CM

## 2019-04-01 PROCEDURE — 96367 TX/PROPH/DG ADDL SEQ IV INF: CPT

## 2019-04-01 PROCEDURE — 63600175 PHARM REV CODE 636 W HCPCS: Performed by: INTERNAL MEDICINE

## 2019-04-01 PROCEDURE — 96366 THER/PROPH/DIAG IV INF ADDON: CPT

## 2019-04-01 PROCEDURE — 96365 THER/PROPH/DIAG IV INF INIT: CPT

## 2019-04-01 PROCEDURE — 25000003 PHARM REV CODE 250: Performed by: INTERNAL MEDICINE

## 2019-04-01 PROCEDURE — 96375 TX/PRO/DX INJ NEW DRUG ADDON: CPT

## 2019-04-01 PROCEDURE — A4216 STERILE WATER/SALINE, 10 ML: HCPCS | Performed by: INTERNAL MEDICINE

## 2019-04-01 PROCEDURE — 77387 GUIDANCE FOR RADJ TX DLVR: CPT | Mod: 26,,, | Performed by: RADIOLOGY

## 2019-04-01 PROCEDURE — S0028 INJECTION, FAMOTIDINE, 20 MG: HCPCS | Performed by: INTERNAL MEDICINE

## 2019-04-01 PROCEDURE — 77385 HC IMRT, SIMPLE: CPT | Performed by: RADIOLOGY

## 2019-04-01 PROCEDURE — 77387 PR GUIDANCE FOR RADIATION TREATMENT DELIVERY: ICD-10-PCS | Mod: 26,,, | Performed by: RADIOLOGY

## 2019-04-01 RX ORDER — SODIUM CHLORIDE 0.9 % (FLUSH) 0.9 %
10 SYRINGE (ML) INJECTION
Status: DISCONTINUED | OUTPATIENT
Start: 2019-04-01 | End: 2019-04-01 | Stop reason: HOSPADM

## 2019-04-01 RX ORDER — ACETAMINOPHEN 325 MG/1
650 TABLET ORAL
Status: COMPLETED | OUTPATIENT
Start: 2019-04-01 | End: 2019-04-01

## 2019-04-01 RX ORDER — FAMOTIDINE 10 MG/ML
20 INJECTION INTRAVENOUS
Status: CANCELLED | OUTPATIENT
Start: 2019-04-22

## 2019-04-01 RX ORDER — FAMOTIDINE 10 MG/ML
20 INJECTION INTRAVENOUS
Status: COMPLETED | OUTPATIENT
Start: 2019-04-01 | End: 2019-04-01

## 2019-04-01 RX ORDER — HEPARIN 100 UNIT/ML
500 SYRINGE INTRAVENOUS
Status: CANCELLED | OUTPATIENT
Start: 2019-04-22

## 2019-04-01 RX ORDER — ACETAMINOPHEN 325 MG/1
650 TABLET ORAL
Status: CANCELLED | OUTPATIENT
Start: 2019-04-22

## 2019-04-01 RX ORDER — ESCITALOPRAM OXALATE 10 MG/1
TABLET ORAL
Qty: 30 TABLET | Refills: 0 | OUTPATIENT
Start: 2019-04-01

## 2019-04-01 RX ORDER — SODIUM CHLORIDE 0.9 % (FLUSH) 0.9 %
10 SYRINGE (ML) INJECTION
Status: CANCELLED | OUTPATIENT
Start: 2019-04-22

## 2019-04-01 RX ORDER — AMPICILLIN TRIHYDRATE 500 MG
CAPSULE ORAL
Qty: 60 CAPSULE | Refills: 0 | OUTPATIENT
Start: 2019-04-01

## 2019-04-01 RX ORDER — HEPARIN 100 UNIT/ML
500 SYRINGE INTRAVENOUS
Status: DISCONTINUED | OUTPATIENT
Start: 2019-04-01 | End: 2019-04-01 | Stop reason: HOSPADM

## 2019-04-01 RX ADMIN — HEPARIN 500 UNITS: 100 SYRINGE at 02:04

## 2019-04-01 RX ADMIN — Medication 10 ML: at 02:04

## 2019-04-01 RX ADMIN — DIPHENHYDRAMINE HYDROCHLORIDE 50 MG: 50 INJECTION, SOLUTION INTRAMUSCULAR; INTRAVENOUS at 10:04

## 2019-04-01 RX ADMIN — FAMOTIDINE 20 MG: 10 INJECTION, SOLUTION INTRAVENOUS at 10:04

## 2019-04-01 RX ADMIN — ACETAMINOPHEN 650 MG: 325 TABLET ORAL at 10:04

## 2019-04-01 RX ADMIN — HUMAN IMMUNOGLOBULIN G 75 G: 5 LIQUID INTRAVENOUS at 10:04

## 2019-04-01 NOTE — PLAN OF CARE
Problem: Adult Inpatient Plan of Care  Goal: Plan of Care Review  Outcome: Ongoing (interventions implemented as appropriate)  Pt tolerated IVIG without adverse effects. VSS. Provided AVS & verbalized understanding of RTC date. DC with family ambulating independently.

## 2019-04-02 ENCOUNTER — INFUSION (OUTPATIENT)
Dept: INFUSION THERAPY | Facility: HOSPITAL | Age: 59
End: 2019-04-02
Attending: INTERNAL MEDICINE
Payer: COMMERCIAL

## 2019-04-02 VITALS
RESPIRATION RATE: 18 BRPM | DIASTOLIC BLOOD PRESSURE: 72 MMHG | SYSTOLIC BLOOD PRESSURE: 128 MMHG | TEMPERATURE: 98 F | HEART RATE: 80 BPM

## 2019-04-02 DIAGNOSIS — R13.19 ESOPHAGEAL DYSPHAGIA: ICD-10-CM

## 2019-04-02 DIAGNOSIS — M33.13 DERMATOMYOSITIS: Primary | ICD-10-CM

## 2019-04-02 PROCEDURE — 96366 THER/PROPH/DIAG IV INF ADDON: CPT

## 2019-04-02 PROCEDURE — 25000003 PHARM REV CODE 250: Performed by: INTERNAL MEDICINE

## 2019-04-02 PROCEDURE — S0028 INJECTION, FAMOTIDINE, 20 MG: HCPCS | Performed by: INTERNAL MEDICINE

## 2019-04-02 PROCEDURE — 77385 HC IMRT, SIMPLE: CPT | Performed by: RADIOLOGY

## 2019-04-02 PROCEDURE — 96365 THER/PROPH/DIAG IV INF INIT: CPT

## 2019-04-02 PROCEDURE — 96367 TX/PROPH/DG ADDL SEQ IV INF: CPT

## 2019-04-02 PROCEDURE — 96375 TX/PRO/DX INJ NEW DRUG ADDON: CPT

## 2019-04-02 PROCEDURE — 77387 GUIDANCE FOR RADJ TX DLVR: CPT | Mod: 26,,, | Performed by: RADIOLOGY

## 2019-04-02 PROCEDURE — 77387 PR GUIDANCE FOR RADIATION TREATMENT DELIVERY: ICD-10-PCS | Mod: 26,,, | Performed by: RADIOLOGY

## 2019-04-02 PROCEDURE — 63600175 PHARM REV CODE 636 W HCPCS: Mod: JG | Performed by: INTERNAL MEDICINE

## 2019-04-02 RX ORDER — ACETAMINOPHEN 325 MG/1
650 TABLET ORAL
Status: CANCELLED | OUTPATIENT
Start: 2019-04-29

## 2019-04-02 RX ORDER — FAMOTIDINE 10 MG/ML
20 INJECTION INTRAVENOUS
Status: COMPLETED | OUTPATIENT
Start: 2019-04-02 | End: 2019-04-02

## 2019-04-02 RX ORDER — HEPARIN 100 UNIT/ML
500 SYRINGE INTRAVENOUS
Status: DISCONTINUED | OUTPATIENT
Start: 2019-04-02 | End: 2019-04-02 | Stop reason: HOSPADM

## 2019-04-02 RX ORDER — SODIUM CHLORIDE 0.9 % (FLUSH) 0.9 %
10 SYRINGE (ML) INJECTION
Status: DISCONTINUED | OUTPATIENT
Start: 2019-04-02 | End: 2019-04-02 | Stop reason: HOSPADM

## 2019-04-02 RX ORDER — SODIUM CHLORIDE 0.9 % (FLUSH) 0.9 %
10 SYRINGE (ML) INJECTION
Status: CANCELLED | OUTPATIENT
Start: 2019-04-29

## 2019-04-02 RX ORDER — HEPARIN 100 UNIT/ML
500 SYRINGE INTRAVENOUS
Status: CANCELLED | OUTPATIENT
Start: 2019-04-29

## 2019-04-02 RX ORDER — FAMOTIDINE 10 MG/ML
20 INJECTION INTRAVENOUS
Status: CANCELLED | OUTPATIENT
Start: 2019-04-29

## 2019-04-02 RX ORDER — ACETAMINOPHEN 325 MG/1
650 TABLET ORAL
Status: COMPLETED | OUTPATIENT
Start: 2019-04-02 | End: 2019-04-02

## 2019-04-02 RX ADMIN — DIPHENHYDRAMINE HYDROCHLORIDE 50 MG: 50 INJECTION, SOLUTION INTRAMUSCULAR; INTRAVENOUS at 10:04

## 2019-04-02 RX ADMIN — FAMOTIDINE 20 MG: 10 INJECTION, SOLUTION INTRAVENOUS at 10:04

## 2019-04-02 RX ADMIN — HUMAN IMMUNOGLOBULIN G 75 G: 5 LIQUID INTRAVENOUS at 11:04

## 2019-04-02 RX ADMIN — ACETAMINOPHEN 650 MG: 325 TABLET ORAL at 10:04

## 2019-04-02 RX ADMIN — HEPARIN 500 UNITS: 100 SYRINGE at 03:04

## 2019-04-02 NOTE — PLAN OF CARE
Problem: Adult Inpatient Plan of Care  Goal: Plan of Care Review  Outcome: Ongoing (interventions implemented as appropriate)  1525:  Patient tolerated IVIG infusion well, VSS, NAD noted, denies any new issues.  Port flushed per protocol and de-accessed.  AVS given.  Patient released in stable condition, ambulated off unit accompanied by .

## 2019-04-03 PROCEDURE — 77385 HC IMRT, SIMPLE: CPT | Performed by: RADIOLOGY

## 2019-04-03 PROCEDURE — 77387 PR GUIDANCE FOR RADIATION TREATMENT DELIVERY: ICD-10-PCS | Mod: 26,,, | Performed by: RADIOLOGY

## 2019-04-03 PROCEDURE — 77387 GUIDANCE FOR RADJ TX DLVR: CPT | Mod: 26,,, | Performed by: RADIOLOGY

## 2019-04-03 NOTE — PROGRESS NOTES
Subjective:       Patient ID: Ermelinda Verde is a 58 y.o. female.    Chief Complaint: No chief complaint on file.    HPI  Mrs Verde is  a very pleasant 58-year-old -American female who returns for a diagnosis of recurrent triple negative left breast cancer.    She is S/P 3 cycles of nab-paclitaxel and Atezolizumab.  Last treatment was January 3, 2019.    Her treatment was complicated by the development of dermatomyositis which resulted in the lengthy hospitalization from January 22nd to February 13th.  She then was transferred to the rehab unit and was discharged last week.  She has been treated with steroids, IVIG, and low-dose methotrexate.    PET-CT in March showed complete response.    Today she reports that she has some residual weakness across her upper limb girdle and shoulders.  She also notes some weakness in her core lower body.  She passed her swallowing test last week and has been gradually increasing her oral intake.  She has no shortness of breath or pain.      Onc History:  She developed a palpable abnormality in her left breast in January 2017 which she noted on self-examination.  A diagnostic mammogram on January 19 showed a greater than 1 cm nodule in the upper outer portion of left breast.  By ultrasound this was lobulated and hypoechoic measuring 1.75 x 1.51 x 1.96 cm.     On January 24, 2017 a core needle biopsy was performed which showed infiltrating ductal carcinoma, high grade.  The tumor was ER negative, MT negative, and HER-2 negative.  A follow-up ultrasound on December 6 showed 2.5 x 2.2 x 1.5 cm left breast mass.  There was no abnormality noted in the left axilla.     She underwent sentinel lymph node biopsy on February 22.  That showed 4 negative lymph nodes.     She had 4 cycles of  Wilbur-adjuvantTaxotere and Cytoxan completed  on 5/9/17.     On June 26 she underwent left mastectomy.  That revealed 2 foci of invasive high-grade carcinoma measuring 14 mm and 1.5 mm.  Margins  were negative.    She completed 4 cycles of adjuvant Adriamycin on September 12, 2017.      In October 2018, she developed some left supraclavicular lymphadenopathy which turned out to be recurrence.     A fine-needle aspirate of the lymph node was performed on October 19th.  That showed metastatic carcinoma consistent with breast primary which was ER negative, NV negative and HER 2-negative.    She then received 3 cycles of Nab paclitaxel and atezolizumab.  She last received treatment on January 3, 2019.  Review of Systems   Constitutional: Positive for activity change (Improved) and fatigue. Negative for appetite change, fever and unexpected weight change.   HENT: Negative for trouble swallowing.    Eyes: Negative for visual disturbance.   Respiratory: Negative for cough and shortness of breath.    Gastrointestinal: Negative.    Genitourinary: Negative.    Neurological: Positive for weakness (Improving). Negative for headaches.   Psychiatric/Behavioral: Negative for dysphoric mood. The patient is not nervous/anxious.        Objective:      Physical Exam   Constitutional: She is oriented to person, place, and time. She appears well-developed and well-nourished.        HENT:   Mouth/Throat: No oropharyngeal exudate.   Facial swelling, slight fullness in the neck but no palpable adenopathy   Eyes: No scleral icterus.   Neck:   Slight fullness   Cardiovascular: Normal rate and regular rhythm.   Pulmonary/Chest: Effort normal and breath sounds normal. No respiratory distress. Right breast exhibits no mass, no nipple discharge and no skin change.       Abdominal: Soft. She exhibits no mass. There is no tenderness.   Musculoskeletal:        Lymphadenopathy:     She has no cervical adenopathy.   Neurological: She is alert and oriented to person, place, and time.   Skin: Skin is dry.   Slightly hyperpigmented scaly skin especially in distal lower extremities   Psychiatric: She has a normal mood and affect. Her behavior is  normal. Thought content normal.   Vitals reviewed.      Assessment:       1. Malignant neoplasm of upper-outer quadrant of left breast in female, estrogen receptor negative    2. Regional lymph node metastasis present    3. Dermatomyositis       Plan:

## 2019-04-04 ENCOUNTER — LAB VISIT (OUTPATIENT)
Dept: LAB | Facility: HOSPITAL | Age: 59
End: 2019-04-04
Attending: INTERNAL MEDICINE
Payer: COMMERCIAL

## 2019-04-04 ENCOUNTER — OFFICE VISIT (OUTPATIENT)
Dept: HEMATOLOGY/ONCOLOGY | Facility: CLINIC | Age: 59
End: 2019-04-04
Payer: COMMERCIAL

## 2019-04-04 VITALS
HEART RATE: 79 BPM | DIASTOLIC BLOOD PRESSURE: 74 MMHG | HEIGHT: 65 IN | BODY MASS INDEX: 28.65 KG/M2 | WEIGHT: 171.94 LBS | OXYGEN SATURATION: 100 % | RESPIRATION RATE: 18 BRPM | SYSTOLIC BLOOD PRESSURE: 139 MMHG | TEMPERATURE: 99 F

## 2019-04-04 DIAGNOSIS — C50.412 MALIGNANT NEOPLASM OF UPPER-OUTER QUADRANT OF LEFT BREAST IN FEMALE, ESTROGEN RECEPTOR NEGATIVE: Chronic | ICD-10-CM

## 2019-04-04 DIAGNOSIS — Z79.899 IMMUNOSUPPRESSION DUE TO DRUG THERAPY: ICD-10-CM

## 2019-04-04 DIAGNOSIS — M33.13 DERMATOMYOSITIS: ICD-10-CM

## 2019-04-04 DIAGNOSIS — C50.412 MALIGNANT NEOPLASM OF UPPER-OUTER QUADRANT OF LEFT BREAST IN FEMALE, ESTROGEN RECEPTOR NEGATIVE: Primary | Chronic | ICD-10-CM

## 2019-04-04 DIAGNOSIS — E53.8 FOLATE DEFICIENCY: ICD-10-CM

## 2019-04-04 DIAGNOSIS — M35.9 POLYMYOSITIS ASSOCIATED WITH AUTOIMMUNE DISEASE: ICD-10-CM

## 2019-04-04 DIAGNOSIS — I10 HYPERTENSION, UNSPECIFIED TYPE: ICD-10-CM

## 2019-04-04 DIAGNOSIS — K22.10 EROSIVE ESOPHAGITIS: ICD-10-CM

## 2019-04-04 DIAGNOSIS — Z17.1 MALIGNANT NEOPLASM OF UPPER-OUTER QUADRANT OF LEFT BREAST IN FEMALE, ESTROGEN RECEPTOR NEGATIVE: Chronic | ICD-10-CM

## 2019-04-04 DIAGNOSIS — D84.821 IMMUNOSUPPRESSION DUE TO DRUG THERAPY: ICD-10-CM

## 2019-04-04 DIAGNOSIS — Z17.1 MALIGNANT NEOPLASM OF UPPER-OUTER QUADRANT OF LEFT BREAST IN FEMALE, ESTROGEN RECEPTOR NEGATIVE: Primary | Chronic | ICD-10-CM

## 2019-04-04 DIAGNOSIS — M33.20 POLYMYOSITIS ASSOCIATED WITH AUTOIMMUNE DISEASE: ICD-10-CM

## 2019-04-04 DIAGNOSIS — C77.9 REGIONAL LYMPH NODE METASTASIS PRESENT: ICD-10-CM

## 2019-04-04 LAB
ALBUMIN SERPL BCP-MCNC: 2.8 G/DL (ref 3.5–5.2)
ALBUMIN SERPL BCP-MCNC: 2.8 G/DL (ref 3.5–5.2)
ALP SERPL-CCNC: 52 U/L (ref 55–135)
ALP SERPL-CCNC: 52 U/L (ref 55–135)
ALT SERPL W/O P-5'-P-CCNC: 20 U/L (ref 10–44)
ALT SERPL W/O P-5'-P-CCNC: 20 U/L (ref 10–44)
ANION GAP SERPL CALC-SCNC: 6 MMOL/L (ref 8–16)
ANION GAP SERPL CALC-SCNC: 6 MMOL/L (ref 8–16)
AST SERPL-CCNC: 44 U/L (ref 10–40)
AST SERPL-CCNC: 44 U/L (ref 10–40)
BASOPHILS # BLD AUTO: 0.02 K/UL (ref 0–0.2)
BASOPHILS NFR BLD: 0.6 % (ref 0–1.9)
BILIRUB SERPL-MCNC: 0.2 MG/DL (ref 0.1–1)
BILIRUB SERPL-MCNC: 0.2 MG/DL (ref 0.1–1)
BUN SERPL-MCNC: 14 MG/DL (ref 6–20)
BUN SERPL-MCNC: 14 MG/DL (ref 6–20)
CALCIUM SERPL-MCNC: 9.2 MG/DL (ref 8.7–10.5)
CALCIUM SERPL-MCNC: 9.2 MG/DL (ref 8.7–10.5)
CHLORIDE SERPL-SCNC: 105 MMOL/L (ref 95–110)
CHLORIDE SERPL-SCNC: 105 MMOL/L (ref 95–110)
CHOLEST SERPL-MCNC: 199 MG/DL (ref 120–199)
CHOLEST/HDLC SERPL: 3.1 {RATIO} (ref 2–5)
CK SERPL-CCNC: 60 U/L (ref 20–180)
CK SERPL-CCNC: 60 U/L (ref 20–180)
CO2 SERPL-SCNC: 26 MMOL/L (ref 23–29)
CO2 SERPL-SCNC: 26 MMOL/L (ref 23–29)
CREAT SERPL-MCNC: 0.8 MG/DL (ref 0.5–1.4)
CREAT SERPL-MCNC: 0.8 MG/DL (ref 0.5–1.4)
DIFFERENTIAL METHOD: ABNORMAL
EOSINOPHIL # BLD AUTO: 0 K/UL (ref 0–0.5)
EOSINOPHIL NFR BLD: 0.3 % (ref 0–8)
ERYTHROCYTE [DISTWIDTH] IN BLOOD BY AUTOMATED COUNT: 17.3 % (ref 11.5–14.5)
ERYTHROCYTE [DISTWIDTH] IN BLOOD BY AUTOMATED COUNT: 17.3 % (ref 11.5–14.5)
EST. GFR  (AFRICAN AMERICAN): >60 ML/MIN/1.73 M^2
EST. GFR  (AFRICAN AMERICAN): >60 ML/MIN/1.73 M^2
EST. GFR  (NON AFRICAN AMERICAN): >60 ML/MIN/1.73 M^2
EST. GFR  (NON AFRICAN AMERICAN): >60 ML/MIN/1.73 M^2
FOLATE SERPL-MCNC: 16 NG/ML (ref 4–24)
GLUCOSE SERPL-MCNC: 82 MG/DL (ref 70–110)
GLUCOSE SERPL-MCNC: 82 MG/DL (ref 70–110)
HCT VFR BLD AUTO: 37.2 % (ref 37–48.5)
HCT VFR BLD AUTO: 37.2 % (ref 37–48.5)
HDLC SERPL-MCNC: 64 MG/DL (ref 40–75)
HDLC SERPL: 32.2 % (ref 20–50)
HGB BLD-MCNC: 11.1 G/DL (ref 12–16)
HGB BLD-MCNC: 11.1 G/DL (ref 12–16)
IMM GRANULOCYTES # BLD AUTO: 0.01 K/UL (ref 0–0.04)
IMM GRANULOCYTES # BLD AUTO: 0.01 K/UL (ref 0–0.04)
IMM GRANULOCYTES NFR BLD AUTO: 0.3 % (ref 0–0.5)
LDLC SERPL CALC-MCNC: 108.6 MG/DL (ref 63–159)
LYMPHOCYTES # BLD AUTO: 0.6 K/UL (ref 1–4.8)
LYMPHOCYTES NFR BLD: 19.9 % (ref 18–48)
MCH RBC QN AUTO: 26.8 PG (ref 27–31)
MCH RBC QN AUTO: 26.8 PG (ref 27–31)
MCHC RBC AUTO-ENTMCNC: 29.8 G/DL (ref 32–36)
MCHC RBC AUTO-ENTMCNC: 29.8 G/DL (ref 32–36)
MCV RBC AUTO: 90 FL (ref 82–98)
MCV RBC AUTO: 90 FL (ref 82–98)
MONOCYTES # BLD AUTO: 0.5 K/UL (ref 0.3–1)
MONOCYTES NFR BLD: 15.1 % (ref 4–15)
NEUTROPHILS # BLD AUTO: 2 K/UL (ref 1.8–7.7)
NEUTROPHILS # BLD AUTO: 2 K/UL (ref 1.8–7.7)
NEUTROPHILS NFR BLD: 63.8 % (ref 38–73)
NONHDLC SERPL-MCNC: 135 MG/DL
NRBC BLD-RTO: 0 /100 WBC
PLATELET # BLD AUTO: 144 K/UL (ref 150–350)
PLATELET # BLD AUTO: 144 K/UL (ref 150–350)
PMV BLD AUTO: 9.3 FL (ref 9.2–12.9)
PMV BLD AUTO: 9.3 FL (ref 9.2–12.9)
POTASSIUM SERPL-SCNC: 3.6 MMOL/L (ref 3.5–5.1)
POTASSIUM SERPL-SCNC: 3.6 MMOL/L (ref 3.5–5.1)
PROT SERPL-MCNC: 9 G/DL (ref 6–8.4)
PROT SERPL-MCNC: 9 G/DL (ref 6–8.4)
RBC # BLD AUTO: 4.14 M/UL (ref 4–5.4)
RBC # BLD AUTO: 4.14 M/UL (ref 4–5.4)
SODIUM SERPL-SCNC: 137 MMOL/L (ref 136–145)
SODIUM SERPL-SCNC: 137 MMOL/L (ref 136–145)
TRIGL SERPL-MCNC: 132 MG/DL (ref 30–150)
WBC # BLD AUTO: 3.11 K/UL (ref 3.9–12.7)
WBC # BLD AUTO: 3.11 K/UL (ref 3.9–12.7)

## 2019-04-04 PROCEDURE — 82085 ASSAY OF ALDOLASE: CPT

## 2019-04-04 PROCEDURE — 3008F PR BODY MASS INDEX (BMI) DOCUMENTED: ICD-10-PCS | Mod: CPTII,S$GLB,, | Performed by: INTERNAL MEDICINE

## 2019-04-04 PROCEDURE — 77385 HC IMRT, SIMPLE: CPT | Performed by: RADIOLOGY

## 2019-04-04 PROCEDURE — 77387 PR GUIDANCE FOR RADIATION TREATMENT DELIVERY: ICD-10-PCS | Mod: 26,,, | Performed by: RADIOLOGY

## 2019-04-04 PROCEDURE — 82550 ASSAY OF CK (CPK): CPT

## 2019-04-04 PROCEDURE — 99213 PR OFFICE/OUTPT VISIT, EST, LEVL III, 20-29 MIN: ICD-10-PCS | Mod: S$GLB,,, | Performed by: INTERNAL MEDICINE

## 2019-04-04 PROCEDURE — 82746 ASSAY OF FOLIC ACID SERUM: CPT

## 2019-04-04 PROCEDURE — 3075F PR MOST RECENT SYSTOLIC BLOOD PRESS GE 130-139MM HG: ICD-10-PCS | Mod: CPTII,S$GLB,, | Performed by: INTERNAL MEDICINE

## 2019-04-04 PROCEDURE — 3078F PR MOST RECENT DIASTOLIC BLOOD PRESSURE < 80 MM HG: ICD-10-PCS | Mod: CPTII,S$GLB,, | Performed by: INTERNAL MEDICINE

## 2019-04-04 PROCEDURE — 3075F SYST BP GE 130 - 139MM HG: CPT | Mod: CPTII,S$GLB,, | Performed by: INTERNAL MEDICINE

## 2019-04-04 PROCEDURE — 36415 COLL VENOUS BLD VENIPUNCTURE: CPT

## 2019-04-04 PROCEDURE — 80053 COMPREHEN METABOLIC PANEL: CPT

## 2019-04-04 PROCEDURE — 81479 UNLISTED MOLECULAR PATHOLOGY: CPT

## 2019-04-04 PROCEDURE — 85025 COMPLETE CBC W/AUTO DIFF WBC: CPT

## 2019-04-04 PROCEDURE — 99999 PR PBB SHADOW E&M-EST. PATIENT-LVL IV: ICD-10-PCS | Mod: PBBFAC,,, | Performed by: INTERNAL MEDICINE

## 2019-04-04 PROCEDURE — 3008F BODY MASS INDEX DOCD: CPT | Mod: CPTII,S$GLB,, | Performed by: INTERNAL MEDICINE

## 2019-04-04 PROCEDURE — 99999 PR PBB SHADOW E&M-EST. PATIENT-LVL IV: CPT | Mod: PBBFAC,,, | Performed by: INTERNAL MEDICINE

## 2019-04-04 PROCEDURE — 77387 GUIDANCE FOR RADJ TX DLVR: CPT | Mod: 26,,, | Performed by: RADIOLOGY

## 2019-04-04 PROCEDURE — 99213 OFFICE O/P EST LOW 20 MIN: CPT | Mod: S$GLB,,, | Performed by: INTERNAL MEDICINE

## 2019-04-04 PROCEDURE — 3078F DIAST BP <80 MM HG: CPT | Mod: CPTII,S$GLB,, | Performed by: INTERNAL MEDICINE

## 2019-04-04 PROCEDURE — 80061 LIPID PANEL: CPT

## 2019-04-05 ENCOUNTER — DOCUMENTATION ONLY (OUTPATIENT)
Dept: RADIATION ONCOLOGY | Facility: CLINIC | Age: 59
End: 2019-04-05

## 2019-04-05 LAB — ALDOLASE SERPL-CCNC: 3.5 U/L (ref 1.2–7.6)

## 2019-04-05 PROCEDURE — 77385 HC IMRT, SIMPLE: CPT | Performed by: RADIOLOGY

## 2019-04-05 PROCEDURE — 77387 PR GUIDANCE FOR RADIATION TREATMENT DELIVERY: ICD-10-PCS | Mod: 26,,, | Performed by: RADIOLOGY

## 2019-04-05 PROCEDURE — 77336 RADIATION PHYSICS CONSULT: CPT | Performed by: RADIOLOGY

## 2019-04-05 PROCEDURE — 77387 GUIDANCE FOR RADJ TX DLVR: CPT | Mod: 26,,, | Performed by: RADIOLOGY

## 2019-04-05 NOTE — PLAN OF CARE
Problem: Adult Inpatient Plan of Care  Goal: Plan of Care Review  Outcome: Ongoing (interventions implemented as appropriate)  Day 5 of outpatient XRT to the left breast. Doing well. No skin issues. Just started treatment.

## 2019-04-07 RX ORDER — FOLIC ACID 1 MG/1
TABLET ORAL
Qty: 120 TABLET | Refills: 0 | Status: SHIPPED | OUTPATIENT
Start: 2019-04-07 | End: 2019-04-25 | Stop reason: SDUPTHER

## 2019-04-08 LAB
NUDT15 GENOTYPE: NORMAL
NUDT15 PHENOTYPE: NORMAL
TPMT ADDITIONAL INFORMATION: NORMAL
TPMT DISCLAIMER: NORMAL
TPMT GENOTYPE RESULT: NORMAL
TPMT INTERPRETATION: NORMAL
TPMT METHOD: NORMAL
TPMT PHENOTYPE: NORMAL
TPMT REVIEWED BY: NORMAL

## 2019-04-08 PROCEDURE — 77385 HC IMRT, SIMPLE: CPT | Performed by: RADIOLOGY

## 2019-04-08 PROCEDURE — 77387 PR GUIDANCE FOR RADIATION TREATMENT DELIVERY: ICD-10-PCS | Mod: 26,,, | Performed by: RADIOLOGY

## 2019-04-08 PROCEDURE — 77387 GUIDANCE FOR RADJ TX DLVR: CPT | Mod: 26,,, | Performed by: RADIOLOGY

## 2019-04-08 PROCEDURE — 77417 THER RADIOLOGY PORT IMAGE(S): CPT | Performed by: RADIOLOGY

## 2019-04-09 PROCEDURE — 77385 HC IMRT, SIMPLE: CPT | Performed by: RADIOLOGY

## 2019-04-10 PROCEDURE — 77387 GUIDANCE FOR RADJ TX DLVR: CPT | Mod: 26,,, | Performed by: RADIOLOGY

## 2019-04-10 PROCEDURE — 77387 PR GUIDANCE FOR RADIATION TREATMENT DELIVERY: ICD-10-PCS | Mod: 26,,, | Performed by: RADIOLOGY

## 2019-04-10 PROCEDURE — 77385 HC IMRT, SIMPLE: CPT | Performed by: RADIOLOGY

## 2019-04-11 PROCEDURE — 77387 GUIDANCE FOR RADJ TX DLVR: CPT | Mod: 26,,, | Performed by: RADIOLOGY

## 2019-04-11 PROCEDURE — 77387 PR GUIDANCE FOR RADIATION TREATMENT DELIVERY: ICD-10-PCS | Mod: 26,,, | Performed by: RADIOLOGY

## 2019-04-11 PROCEDURE — 77385 HC IMRT, SIMPLE: CPT | Performed by: RADIOLOGY

## 2019-04-12 ENCOUNTER — DOCUMENTATION ONLY (OUTPATIENT)
Dept: RADIATION ONCOLOGY | Facility: CLINIC | Age: 59
End: 2019-04-12

## 2019-04-12 DIAGNOSIS — Z17.1 MALIGNANT NEOPLASM OF UPPER-OUTER QUADRANT OF LEFT BREAST IN FEMALE, ESTROGEN RECEPTOR NEGATIVE: Primary | Chronic | ICD-10-CM

## 2019-04-12 DIAGNOSIS — C50.412 MALIGNANT NEOPLASM OF UPPER-OUTER QUADRANT OF LEFT BREAST IN FEMALE, ESTROGEN RECEPTOR NEGATIVE: Primary | Chronic | ICD-10-CM

## 2019-04-12 PROCEDURE — 77387 GUIDANCE FOR RADJ TX DLVR: CPT | Mod: 26,,, | Performed by: RADIOLOGY

## 2019-04-12 PROCEDURE — 77336 RADIATION PHYSICS CONSULT: CPT | Performed by: RADIOLOGY

## 2019-04-12 PROCEDURE — 77385 HC IMRT, SIMPLE: CPT | Performed by: RADIOLOGY

## 2019-04-12 PROCEDURE — 77387 PR GUIDANCE FOR RADIATION TREATMENT DELIVERY: ICD-10-PCS | Mod: 26,,, | Performed by: RADIOLOGY

## 2019-04-12 NOTE — PLAN OF CARE
Problem: Adult Inpatient Plan of Care  Goal: Plan of Care Review  Outcome: Ongoing (interventions implemented as appropriate)  Day 10 of outpatient XRT to the left breast mound. Doing well. Skin intact. Using Miaderm.

## 2019-04-15 PROCEDURE — 77387 GUIDANCE FOR RADJ TX DLVR: CPT | Mod: 26,,, | Performed by: RADIOLOGY

## 2019-04-15 PROCEDURE — 77385 HC IMRT, SIMPLE: CPT | Performed by: RADIOLOGY

## 2019-04-15 PROCEDURE — 77387 PR GUIDANCE FOR RADIATION TREATMENT DELIVERY: ICD-10-PCS | Mod: 26,,, | Performed by: RADIOLOGY

## 2019-04-15 PROCEDURE — 77417 THER RADIOLOGY PORT IMAGE(S): CPT | Performed by: RADIOLOGY

## 2019-04-16 PROCEDURE — 77387 GUIDANCE FOR RADJ TX DLVR: CPT | Mod: 26,,, | Performed by: RADIOLOGY

## 2019-04-16 PROCEDURE — 77387 PR GUIDANCE FOR RADIATION TREATMENT DELIVERY: ICD-10-PCS | Mod: 26,,, | Performed by: RADIOLOGY

## 2019-04-16 PROCEDURE — 77385 HC IMRT, SIMPLE: CPT | Performed by: RADIOLOGY

## 2019-04-17 PROCEDURE — 77385 HC IMRT, SIMPLE: CPT | Performed by: RADIOLOGY

## 2019-04-17 PROCEDURE — 77387 PR GUIDANCE FOR RADIATION TREATMENT DELIVERY: ICD-10-PCS | Mod: 26,,, | Performed by: RADIOLOGY

## 2019-04-17 PROCEDURE — 77387 GUIDANCE FOR RADJ TX DLVR: CPT | Mod: 26,,, | Performed by: RADIOLOGY

## 2019-04-18 ENCOUNTER — DOCUMENTATION ONLY (OUTPATIENT)
Dept: RADIATION ONCOLOGY | Facility: CLINIC | Age: 59
End: 2019-04-18

## 2019-04-18 PROCEDURE — 77387 GUIDANCE FOR RADJ TX DLVR: CPT | Mod: 26,,, | Performed by: RADIOLOGY

## 2019-04-18 PROCEDURE — 77385 HC IMRT, SIMPLE: CPT | Performed by: RADIOLOGY

## 2019-04-18 PROCEDURE — 77387 PR GUIDANCE FOR RADIATION TREATMENT DELIVERY: ICD-10-PCS | Mod: 26,,, | Performed by: RADIOLOGY

## 2019-04-18 NOTE — PLAN OF CARE
Problem: Adult Inpatient Plan of Care  Goal: Plan of Care Review  Outcome: Ongoing (interventions implemented as appropriate)  Pt. On day 14 of outpt. xrt to breast mound.  Skin intact.  Using miaderm.  No erythema.

## 2019-04-22 PROCEDURE — 77387 PR GUIDANCE FOR RADIATION TREATMENT DELIVERY: ICD-10-PCS | Mod: 26,,, | Performed by: RADIOLOGY

## 2019-04-22 PROCEDURE — 77336 RADIATION PHYSICS CONSULT: CPT | Performed by: RADIOLOGY

## 2019-04-22 PROCEDURE — 77385 HC IMRT, SIMPLE: CPT | Performed by: RADIOLOGY

## 2019-04-22 PROCEDURE — 77387 GUIDANCE FOR RADJ TX DLVR: CPT | Mod: 26,,, | Performed by: RADIOLOGY

## 2019-04-23 PROCEDURE — 77385 HC IMRT, SIMPLE: CPT | Performed by: RADIOLOGY

## 2019-04-23 PROCEDURE — 77417 THER RADIOLOGY PORT IMAGE(S): CPT | Performed by: RADIOLOGY

## 2019-04-23 PROCEDURE — 77387 PR GUIDANCE FOR RADIATION TREATMENT DELIVERY: ICD-10-PCS | Mod: 26,,, | Performed by: RADIOLOGY

## 2019-04-23 PROCEDURE — 77387 GUIDANCE FOR RADJ TX DLVR: CPT | Mod: 26,,, | Performed by: RADIOLOGY

## 2019-04-24 PROCEDURE — 77385 HC IMRT, SIMPLE: CPT | Performed by: RADIOLOGY

## 2019-04-24 PROCEDURE — 77387 GUIDANCE FOR RADJ TX DLVR: CPT | Mod: 26,,, | Performed by: RADIOLOGY

## 2019-04-24 PROCEDURE — 77387 PR GUIDANCE FOR RADIATION TREATMENT DELIVERY: ICD-10-PCS | Mod: 26,,, | Performed by: RADIOLOGY

## 2019-04-24 NOTE — PROGRESS NOTES
RHEUMATOLOGY CLINIC FOLLOW UP VISIT    Chief complaints:- Myositis       HPI:-  Ermelinda Javed a 58 y.o.F with history of L sided infiltrating ductal carcinoma of the L breast (dx on Jan 2017), HTN, depression, gastritis, and recently diagnosed myositis (uncertain if it's autoimmune vs medication induced), recently discharge to rehab present to the clinic today for hospital follow up.    Patient was initially send from hem/onc clinic for evaluation of possible inflammatory myositis.  Patient was hospitalized from 1/22/19 till 2/13/19 for myositis.      L breast cancer s/p completion of 4 cycles of demi-adjuvant taxotere and cytoxan (completed on 5/9/17) and mastectomy (6/26/17) and then 4 cycles of adjuvant Adriamycin (completed 9/12/17). In 10/2017 - patient developed L supraclavicular lymphadenopathy which FNA confirmed as reoccurrence of previously treated breast cancer.      Patient started on Atelizumab/Abraxane on 11/7/18. Per chart review, patient noticed rashes on the dorsum of her hands on 11/11/18. Seen in urgent care and given topical steroid cream. Then received two more infusions on Atelizumab/Abraxane on 11/21/18 and 12/5/18. Started to notice puffiness around the eyes on 12/11/18. Received another Abraxane infusion on 12/12/18 but this time with hydrocortisone 50 mg which helped reduce the swelling around the eyes. Developed swelling of the face again on 12/15/18. Next infusion of Abraxane done on 12/19/18 with Solucortef and Atelizumab held. Abraxane given again on 1/3/19 with no IV steroid. Patient developed swelling of the face on 1/4/19 and went to urgent care and given short course of prednisone 20 mg BID. On 1/15/19, patient seen in hem/onc clinic with c/o of pressure and tightness around neck. CT scan of chest and neck did not reveal any vascular compression. Started on prednisone 60 mg with taper. On 1/22/18 patient with c/o proximal muscle weakness. CPK found to  be elevated around 4k. Patient admitted and given solumedrol 80 mg IV on 1/22/18. Last infusion of Atelizumab on 12/19/18. Last infusion of Abraxane on 1/3/19. Given Solumedrol 1g x 1 on 1/23/18. Seen by Dermatology on 1/24/18 and biopsy done of skin rash. Given distribution of rashes, there was concern for dermatomyositis. Patient started on Solumedrol 125 mg IV BID at this time.     During her hospitalization patient was started on high dose steroid Solumedrol 80mg IV x 1, 1g x 1, and then 125mg BID until tapered down to medrol 48mg.  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  Patient was started on IVIG (400mg/kg/daily x 5 day) 1/29/19-2/2.   Patient started on MTX 20mg SQ 2/5 with folic acid. MTX SQ was transitioned to PO because concern about rehab administration.  Patient failed swallow eval and PEG tube was placed.        Denies any family history of autoimmune diseases.     No smoking, EtOH, recreational drug usage.     Denies any photosensitivity, joint swelling, unintentional weigh loss, abdominal pain, night sweats, CP, SOB.  +oral thrush.     Completed rehab at [facility].  Completed IVIG (2/28 and 3/1).  Had mild HA after infusion.  Doing well.  A lot stronger - able to do household chores since discharge.  Not back at baseline yet ~80%.  Mild weakness with increased activities.  Able to ambulate without assistance (but is still a fall precaution).  Tolerating diet via PEG tube.  HHC and PT 2x/week.     MTX discontinued 2/18 with concern of toxicity (decrease blood counts and transaminatis).  Folic acid increased to 4mg daily.  Still on medrol 32mg daily with atorvaquone for PCP prophylaxis.  Bactrim d/c because of interaction with leucovorin.  Leucovorin 5mg daily started     Passed swallow eval - PEG tube still in place (until radiation is completed).  Tolerating diet without any problems.  Doing home PT and feels well.      Denies any new rashes, alopecia, arthralgia, abdominal pain, CP,  or SOB, N/V, fever/chills.       Review of Systems   Constitutional: Negative for chills, diaphoresis, fever, malaise/fatigue and weight loss.   HENT: Negative for congestion, ear discharge, ear pain, hearing loss, nosebleeds, sinus pain and tinnitus.    Eyes: Negative for photophobia, pain, discharge and redness.   Respiratory: Negative for cough, hemoptysis, sputum production, shortness of breath, wheezing and stridor.    Cardiovascular: Negative for chest pain, palpitations, orthopnea, claudication, leg swelling and PND.   Gastrointestinal: Negative for abdominal pain, constipation, diarrhea, heartburn, nausea and vomiting.   Genitourinary: Negative for dysuria, frequency, hematuria and urgency.   Musculoskeletal: Positive for myalgias. Negative for back pain, joint pain and neck pain.   Skin: Negative for rash.   Neurological: Positive for weakness. Negative for dizziness, tingling, tremors and headaches.   Endo/Heme/Allergies: Does not bruise/bleed easily.   Psychiatric/Behavioral: Negative for depression, hallucinations and suicidal ideas. The patient is not nervous/anxious and does not have insomnia.        Past Medical History:   Diagnosis Date    Breast cancer 2017    left    Depression     Diverticulosis     Gastritis     Hypertension     Vitamin B12 deficiency 3/8/2018       Past Surgical History:   Procedure Laterality Date    BIOPSY-SENTINEL NODE Left 2017    [facility]    BREAST BIOPSY Left     BREAST RECONSTRUCTION Left 2017     SECTION      COLONOSCOPY  2011    repeat in 10 yrs.    D&C      EGD (ESOPHAGOGASTRODUODENOSCOPY) N/A 2019    Performed by [facility]ernell Rosas [facility] at [facility] ENDO (2ND FLR)    INSERTION, PEG TUBE N/A 2019    Performed by Zurdo Gordon MD at [facility] ENDO (2ND FLR)    [id number]-B-CATH Right 10/31/2018    Performed by Deo Palencia MD at [facility] OR 2ND FLR    QVBBJMBDX-YPEU-E-CATH Right 2017     "Performed by [facility] at [facility] OR    BOMSGJMRJ-AIYW-J-CATH-neck or chest Fluoro needed Consent AM of surgery Right 10/30/2018    Performed by Deo Palencia MD at [facility] OR 2ND FLR    MASTECTOMY Left 06/26/2017    MASTECTOMY Left 6/26/2017    Performed by Regina Rose MD at [facility] OR    MYOMECTOMY      PORTACATH PLACEMENT      RECONSTRUCTION-BREAST/FLAP-SCOTT Left 6/26/2017    Performed by [facility]uk[facility]jinder Sewell MD at [facility] OR    SENTINEL LYMPH NODE BIOPSY  02/2017    left    TOTAL REDUCTION MAMMOPLASTY Right 2017        Social History     Tobacco Use    Smoking status: Never Smoker    Smokeless tobacco: Never Used   Substance Use Topics    Alcohol use: Yes     Comment: wine    Drug use: No       Family History   Problem Relation Age of Onset    Heart disease Father     Hypertension Father     Breast cancer Sister 52    Hypertension Mother     No Known Problems Brother     Thyroid disease Daughter         hyperthyroidism s/p thyroidectomy    No Known Problems Son     No Known Problems Brother     No Known Problems Brother     No Known Problems Sister     No Known Problems Daughter     Colon cancer Neg Hx     Ovarian cancer Neg Hx        Review of patient's allergies indicates:  No Known Allergies        Physical examination:-    Vitals:    04/25/19 1051   BP: 139/80   Pulse: 101   Weight: 79.7 kg (175 lb 11.3 oz)   Height: 5' 5" (1.651 m)   PainSc: 0-No pain       Physical Exam   Constitutional: She is oriented to person, place, and time and well-developed, well-nourished, and in no distress.   HENT:   Head: Normocephalic and atraumatic.   Eyes: Pupils are equal, round, and reactive to light. Conjunctivae are normal.   Neck: Normal range of motion. Neck supple. Tracheal deviation: tpmt.   Cardiovascular: Normal rate, regular rhythm and normal heart sounds.   Pulmonary/Chest: Effort normal and breath sounds normal.   Abdominal: Soft. Bowel sounds are normal. Rebound: pmt. "   Musculoskeletal: She exhibits no edema or tenderness.   Right Side Rheumatological Exam      Muscle Strength (0-5 scale):  Neck Flexion:  4  Neck Extension: 5  Deltoid:  4  Biceps: 5/5   Triceps:  4.4  : 4.6/5   Iliopsoas: 4.0  Quadriceps:  5   Distal Lower Extremity: 5     Left Side Rheumatological Exam      Muscle Strength (0-5 scale):  Neck Flexion:  5  Neck Extension: 5  Deltoid:  4.5  Biceps: 5/5   Triceps:  4.7  :  5  Iliopsoas: 4.0  Quadriceps:  5   Distal Lower Extremity: 5     ROM intact   Neurological: She is alert and oriented to person, place, and time. Gait normal.   Skin: Skin is warm and dry.   Guttron's papules on hands - improved   Nape of the neck and bilateral lower extremities (shins) rash still present - improvement from hospitalization      Psychiatric: Mood, memory, affect and judgment normal.       Labs:-  Results for ROMEO PEREZ (MRN [id number]) as of 2/15/2019 15:30   Ref. Range 2/10/2019 04:00 2/11/2019 04:00 2/13/2019 03:21 2/13/2019 08:33 2/13/2019 10:45   WBC Latest Ref Range: 3.90 - 12.70 K/uL 4.79 4.57 3.69 (L)     RBC Latest Ref Range: 4.00 - 5.40 M/uL 2.55 (L) 2.49 (L) 2.60 (L)     Hemoglobin Latest Ref Range: 12.0 - 16.0 g/dL 7.0 (L) 7.2 (L) 7.1 (L)     Hematocrit Latest Ref Range: 37.0 - 48.5 % 22.7 (L) 22.7 (L) 23.8 (L)     MCV Latest Ref Range: 82 - 98 fL 89 91 92     MCH Latest Ref Range: 27.0 - 31.0 pg 27.5 28.9 27.3     MCHC Latest Ref Range: 32.0 - 36.0 g/dL 30.8 (L) 31.7 (L) 29.8 (L)     RDW Latest Ref Range: 11.5 - 14.5 % 20.0 (H) 20.1 (H) 20.7 (H)     Platelets Latest Ref Range: 150 - 350 K/uL 152 161 149 (L)     MPV Latest Ref Range: 9.2 - 12.9 fL 10.1 9.6 9.8     Gran% Latest Ref Range: 38.0 - 73.0 % 71.6 77.7 (H) 79.1 (H)     Gran # (ANC) Latest Ref Range: 1.8 - 7.7 K/uL 3.4 3.6 2.9     Immature Granulocytes Latest Ref Range: 0.0 - 0.5 % 0.4 0.4 0.5     Immature Grans (Abs) Latest Ref Range: 0.00 - 0.04 K/uL 0.02 0.02 0.02     Lymph% Latest Ref Range:  18.0 - 48.0 % 21.3 16.0 (L) 14.4 (L)     Lymph # Latest Ref Range: 1.0 - 4.8 K/uL 1.0 0.7 (L) 0.5 (L)     Mono% Latest Ref Range: 4.0 - 15.0 % 6.3 5.7 6.0     Mono # Latest Ref Range: 0.3 - 1.0 K/uL 0.3 0.3 0.2 (L)     Eosinophil% Latest Ref Range: 0.0 - 8.0 % 0.4 0.2 0.0     Eos # Latest Ref Range: 0.0 - 0.5 K/uL 0.0 0.0 0.0     Basophil% Latest Ref Range: 0.0 - 1.9 % 0.0 0.0 0.0     Baso # Latest Ref Range: 0.00 - 0.20 K/uL 0.00 0.00 0.00     nRBC Latest Ref Range: 0 /100 WBC 0 0 1 (A)     Differential Method Unknown Automated Automated Automated     Retic Latest Ref Range: 0.5 - 2.5 % 3.0 (H)       Sodium Latest Ref Range: 136 - 145 mmol/L 136  136     Potassium Latest Ref Range: 3.5 - 5.1 mmol/L 3.7  3.9     Chloride Latest Ref Range: 95 - 110 mmol/L 104  103     CO2 Latest Ref Range: 23 - 29 mmol/L 28  27     Anion Gap Latest Ref Range: 8 - 16 mmol/L 4 (L)  6 (L)     BUN, Bld Latest Ref Range: 6 - 20 mg/dL 24 (H)  19     Creatinine Latest Ref Range: 0.5 - 1.4 mg/dL 0.5  0.5     eGFR if non African American Latest Ref Range: >60 mL/min/1.73 m^2 >60.0  >60.0     eGFR if African American Latest Ref Range: >60 mL/min/1.73 m^2 >60.0  >60.0     Glucose Latest Ref Range: 70 - 110 mg/dL 106  103     Calcium Latest Ref Range: 8.7 - 10.5 mg/dL 8.1 (L)  8.2 (L)     Phosphorus Latest Ref Range: 2.7 - 4.5 mg/dL 1.9 (L)       Magnesium Latest Ref Range: 1.6 - 2.6 mg/dL 1.8       Alkaline Phosphatase Latest Ref Range: 55 - 135 U/L 65  72     Total Protein Latest Ref Range: 6.0 - 8.4 g/dL 5.6 (L)  5.9 (L)     Albumin Latest Ref Range: 3.5 - 5.2 g/dL 2.4 (L)  2.6 (L)     Total Bilirubin Latest Ref Range: 0.1 - 1.0 mg/dL 0.3  0.3     AST Latest Ref Range: 10 - 40 U/L 63 (H)  67 (H)     ALT Latest Ref Range: 10 - 44 U/L 37  42     CPK Latest Ref Range: 20 - 180 U/L 166       Group & Rh Unknown     O POS   INDIRECT RENEE Unknown     NEG   PREPARE RBC SOFT Unknown    Rpt    UNIT NUMBER Unknown    [id number]    PRODUCT CODE  Unknown    [id number]    DISPENSE STATUS Unknown    TRANSFUSED    Aldolase Latest Ref Range: 1.2 - 7.6 U/L 3.2         Results for ROMEO PEREZ (MRN [id number]) as of 2/15/2019 15:30   Ref. Range 1/22/2019 22:24 1/25/2019 17:33 1/28/2019 19:31 1/29/2019 05:00 1/29/2019 12:27   DARCY HEP-2 Titer Unknown Positive 1:640 Sp...       Anti-SSA Antibody Latest Ref Range: 0.00 - 19.99 EU 0.49       Anti-SSA Interpretation Latest Ref Range: Negative  Negative       Anti-SSB Antibody Latest Ref Range: 0.00 - 19.99 EU 0.11       Anti-SSB Interpretation Latest Ref Range: Negative  Negative       ds DNA Ab Latest Ref Range: Negative 1:10  Negative 1:10       Anti Sm Antibody Latest Ref Range: 0.00 - 19.99 EU 0.58       Anti-Sm Interpretation Latest Ref Range: Negative  Negative       Anti Sm/RNP Antibody Latest Ref Range: 0.00 - 19.99 EU 0.62       Anti-Sm/RNP Interpretation Latest Ref Range: Negative  Negative       DARCY Screen Latest Ref Range: Negative <1:160  Positive (A)       Complement (C-3) Latest Ref Range: 50 - 180 mg/dL  106      Complement (C-4) Latest Ref Range: 11 - 44 mg/dL  29      IgG - Serum Latest Ref Range: 650 - 1600 mg/dL   932     IgM Latest Ref Range: 50 - 300 mg/dL   66     IgA Latest Ref Range: 40 - 350 mg/dL   533 (H)     Anti-Meg-1 Antibody Latest Ref Range: <20 Units    <20    Anti-PM/Scl Ab Latest Ref Range: <20 Units    <20    Anti-SS-A 52 kD Ab, IgG Latest Ref Range: <20 Units    23 (H)    Anti-U1-RNP  Ab Latest Ref Range: <20 Units    <20    EJ Latest Ref Range: Negative     Negative    EJ Autoantibodies Latest Ref Range: Not Detected  Not Detected       Fibrillarin (U3 RNP) Latest Ref Range: Negative     Negative    HMGCR Antibody Latest Ref Range: 0 - 19 Units     <3   Meg-1 Autoantibodies Latest Ref Range: <1.0 Index <1.00       Ku Latest Ref Range: Negative     Negative    Ku Autoantibodies Latest Ref Range: Not Detected  Not Detected       MDA-5 (P140) (CADM-140) Latest Ref Range: <20 Units     <20    MI-2 Latest Ref Range: Negative     Negative    MI-2 Autoab Latest Ref Range: Not Detected  Not Detected       NXP-2 (P140) Latest Ref Range: <20 Units    <20    OJ Latest Ref Range: Negative     Negative    OJ Autoantibodies Latest Ref Range: Not Detected  Not Detected       PL-12 Latest Ref Range: Negative     Negative    PL-12 Autoantibodies Latest Ref Range: Not Detected  Not Detected       PL-7 Latest Ref Range: Negative     Negative    PL-7 Autoantibodies Latest Ref Range: Not Detected  Not Detected       SRP Latest Ref Range: Negative     Negative    SRP Autoantibodies Latest Ref Range: Not Detected  Not Detected       TIF1 GAMMA (P155/140) Latest Ref Range: <20 Units    25 (H)    U2 snRNP Latest Ref Range: Negative     Negative      Radiographs:-  Independent visualization of images done.   CXR (1/28) - clear lungs  PET scan - no pulmonary/GI activity.       Medication List with Changes/Refills   New Medications    CHOLECALCIFEROL, VITAMIN D3, (VITAMIN D3) 1,000 UNIT CAPSULE    Take 2 capsules (2,000 Units total) by mouth once daily.    PREDNISONE (DELTASONE) 10 MG TABLET    Take 3 tablets (30 mg total) by mouth once daily. Please take 3 tablets x 2 weeks and then 2 therafter   Current Medications    (MAGIC MOUTHWASH) 1:1:1 BENADRYL 12.5MG/5ML LIQ, ALUMINUM & MAGNESIUM HYDROXIDE-SIMEHTICONE (MAALOX), LIDOCAINE VISCOUS 2%    Swish and spit 10 mLs every 4 (four) hours as needed. for mouth sores    ACETAMINOPHEN (TYLENOL) 325 MG TABLET    650 mg by Tube route every 4 (four) hours as needed for Pain.    ATOVAQUONE/PROGUANIL HCL (ATOVAQUONE-PROGUANIL ORAL)        FLUTICASONE (FLONASE) 50 MCG/ACTUATION NASAL SPRAY    SHAKE LIQUID AND USE 1 SPRAY(50 MCG) IN EACH NOSTRIL EVERY DAY    OMEPRAZOLE (PRILOSEC) 40 MG CAPSULE    Take 1 capsule (40 mg total) by mouth 2 (two) times daily before meals.    SENNA-DOCUSATE 8.6-50 MG (PERICOLACE) 8.6-50 MG PER TABLET    2 tablets by Tube route daily with lunch.     TRIAMCINOLONE ACETONIDE 0.1% (KENALOG) 0.1 % CREAM    STEVIE EXT AA BID FOR 7 DAYS   Changed and/or Refilled Medications    Modified Medication Previous Medication    FOLIC ACID (FOLVITE) 1 MG TABLET folic acid (FOLVITE) 1 MG tablet       TAKE 4 TABLETS(4 MG) VIA GTUBE EVERY DAY    TAKE 4 TABLETS(4 MG) VIA GTUBE EVERY DAY    LEUCOVORIN 5 MG/ML SUSP leucovorin 5 mg/mL Susp       Take 1 mL (5 mg total) by mouth once daily.       Discontinued Medications    METHYLPREDNISOLONE (MEDROL) 16 MG TAB           Assessment/Plans:-   58 y.o.F with history of L sided infiltrating ductal carcinoma of the L breast (dx on Jan 2017), HTN, depression, gastritis, and recently diagnosed myositis (uncertain if it's autoimmune vs medication induced), recently discharge to rehab present to the clinic today for hospital follow up.       Patient started on Atelizumab/Abraxane on 11/7/18. Per chart review, patient noticed rashes on the dorsum of her hands on 11/11/18. Seen in urgent care and given topical steroid cream. Then received two more infusions on Atelizumab/Abraxane on 11/21/18 and 12/5/18. Started to notice puffiness around the eyes on 12/11/18. Received another Abraxane infusion on 12/12/18 but this time with hydrocortisone 50 mg which helped reduce the swelling around the eyes. Developed swelling of the face again on 12/15/18. Next infusion of Abraxane done on 12/19/18 with Solucortef and Atelizumab held. Abraxane given again on 1/3/19 with no IV steroid. Patient developed swelling of the face on 1/4/19 and went to urgent care and given short course of prednisone 20 mg BID. On 1/15/19, patient seen in hem/onc clinic with c/o of pressure and tightness around neck. CT scan of chest and neck did not reveal any vascular compression. Started on prednisone 60 mg with taper. On 1/22/18 patient with c/o proximal muscle weakness. CPK found to be elevated around 4k. Patient admitted and given solumedrol 80 mg IV on 1/22/18. Last infusion of  Atelizumab on 12/19/18. Last infusion of Abraxane on 1/3/19. Given Solumedrol 1g x 1 on 1/23/18. Seen by Dermatology on 1/24/18 and biopsy done of skin rash. Given distribution of rashes, there was concern for dermatomyositis. Patient started on Solumedrol 125 mg IV BID at that time.  Skin biopsy resulted consistent with dermatomyositis.     Labs: CPK and Aldolase normalized. +DARCY 1:640 speckled with negative profile.  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 quantTB: indeterminate, T-spot: negative     Spirometry: normal, normal diffusion capacity. FVC: 81%, DLCO: 114%     Patient exhibits signs of dermatomyositis (heliotropic rash and gottron's papules).   Dermatomyositis can be associated with malignancy.  MRI of LUE showed edema of the deltoid and biceps.  Exam with proximal muscle weakness: b/l deltoids 4/5, b/l iliopsoas 4.4/5. 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   Given +TIF1 gamma positivity, most likely dermatomyositis is from underlying malignancy.      Failed 2nd swallow eval on 2/6/19. PEG placed 2/7/2019.     Current medications:  - medrol 32mg   - IVIG 1/29/19-2/2, 2/28-3/1, 4/1-2   - folic acid daily 4mg  - leucovorin 5mg daily  - atorvaquone daily     Esophageal biopsy - negative for viral infection.  Nonspecific chronic inflammation.    Patient is an intermediate metabolizer based on TPMT.  Cannot start imuran.  Labs still neutropenic at this time - which can be her new baseline.  If labs does not improve - consideration to start patient on Rituximab.  If labs improve,  patient can be started on Cellcept.     Plan:  - CBC, CMP, CPK, Aldolase to be done prior to next visit  - discontinue medrol  - start prednisone 30mg daily x 2 weeks and then 20mg until follow up  - GI referral for colonoscopy (cancer screening).  Last colonoscopy was 2011/2012 - per patient normal and next scheduled colonoscopy was in 10 years.   - continue folic acid 4mg daily  - Leucovirin 5mg daily   - continue rehab - muscle strengthening   - will need to have all vaccination complete - Prevnar, shingles  - 1200 mg dietary calcium and Vitamin D3 1000 mg daily  - continue H2 blocker  - continue atorvaquone for PCP prophylaxis   - IVIG scheduled for May 6/7 @7:30am  - message/call immediately if worsening muscle weakness      # RTC in 4 weeks             Answers for HPI/ROS submitted by the patient on 4/23/2019   trouble swallowing: No  unexpected weight change: No  genital sore: No

## 2019-04-25 ENCOUNTER — OFFICE VISIT (OUTPATIENT)
Dept: RHEUMATOLOGY | Facility: CLINIC | Age: 59
End: 2019-04-25
Payer: COMMERCIAL

## 2019-04-25 VITALS
DIASTOLIC BLOOD PRESSURE: 80 MMHG | BODY MASS INDEX: 29.27 KG/M2 | HEIGHT: 65 IN | WEIGHT: 175.69 LBS | HEART RATE: 101 BPM | SYSTOLIC BLOOD PRESSURE: 139 MMHG

## 2019-04-25 DIAGNOSIS — Z79.899 IMMUNOSUPPRESSION DUE TO DRUG THERAPY: Primary | ICD-10-CM

## 2019-04-25 DIAGNOSIS — C50.412 MALIGNANT NEOPLASM OF UPPER-OUTER QUADRANT OF LEFT BREAST IN FEMALE, ESTROGEN RECEPTOR NEGATIVE: ICD-10-CM

## 2019-04-25 DIAGNOSIS — Z17.1 MALIGNANT NEOPLASM OF UPPER-OUTER QUADRANT OF LEFT BREAST IN FEMALE, ESTROGEN RECEPTOR NEGATIVE: ICD-10-CM

## 2019-04-25 DIAGNOSIS — D84.821 IMMUNOSUPPRESSION DUE TO DRUG THERAPY: Primary | ICD-10-CM

## 2019-04-25 DIAGNOSIS — M33.13 DERMATOMYOSITIS: ICD-10-CM

## 2019-04-25 PROCEDURE — 99999 PR PBB SHADOW E&M-EST. PATIENT-LVL IV: ICD-10-PCS | Mod: PBBFAC,,, | Performed by: STUDENT IN AN ORGANIZED HEALTH CARE EDUCATION/TRAINING PROGRAM

## 2019-04-25 PROCEDURE — 99214 PR OFFICE/OUTPT VISIT, EST, LEVL IV, 30-39 MIN: ICD-10-PCS | Mod: S$GLB,,, | Performed by: STUDENT IN AN ORGANIZED HEALTH CARE EDUCATION/TRAINING PROGRAM

## 2019-04-25 PROCEDURE — 3008F PR BODY MASS INDEX (BMI) DOCUMENTED: ICD-10-PCS | Mod: CPTII,S$GLB,, | Performed by: STUDENT IN AN ORGANIZED HEALTH CARE EDUCATION/TRAINING PROGRAM

## 2019-04-25 PROCEDURE — 3079F DIAST BP 80-89 MM HG: CPT | Mod: CPTII,S$GLB,, | Performed by: STUDENT IN AN ORGANIZED HEALTH CARE EDUCATION/TRAINING PROGRAM

## 2019-04-25 PROCEDURE — 3075F PR MOST RECENT SYSTOLIC BLOOD PRESS GE 130-139MM HG: ICD-10-PCS | Mod: CPTII,S$GLB,, | Performed by: STUDENT IN AN ORGANIZED HEALTH CARE EDUCATION/TRAINING PROGRAM

## 2019-04-25 PROCEDURE — 3075F SYST BP GE 130 - 139MM HG: CPT | Mod: CPTII,S$GLB,, | Performed by: STUDENT IN AN ORGANIZED HEALTH CARE EDUCATION/TRAINING PROGRAM

## 2019-04-25 PROCEDURE — 3008F BODY MASS INDEX DOCD: CPT | Mod: CPTII,S$GLB,, | Performed by: STUDENT IN AN ORGANIZED HEALTH CARE EDUCATION/TRAINING PROGRAM

## 2019-04-25 PROCEDURE — 99214 OFFICE O/P EST MOD 30 MIN: CPT | Mod: S$GLB,,, | Performed by: STUDENT IN AN ORGANIZED HEALTH CARE EDUCATION/TRAINING PROGRAM

## 2019-04-25 PROCEDURE — 77387 GUIDANCE FOR RADJ TX DLVR: CPT | Mod: 26,,, | Performed by: RADIOLOGY

## 2019-04-25 PROCEDURE — 3079F PR MOST RECENT DIASTOLIC BLOOD PRESSURE 80-89 MM HG: ICD-10-PCS | Mod: CPTII,S$GLB,, | Performed by: STUDENT IN AN ORGANIZED HEALTH CARE EDUCATION/TRAINING PROGRAM

## 2019-04-25 PROCEDURE — 77387 PR GUIDANCE FOR RADIATION TREATMENT DELIVERY: ICD-10-PCS | Mod: 26,,, | Performed by: RADIOLOGY

## 2019-04-25 PROCEDURE — 77385 HC IMRT, SIMPLE: CPT | Performed by: RADIOLOGY

## 2019-04-25 PROCEDURE — 99999 PR PBB SHADOW E&M-EST. PATIENT-LVL IV: CPT | Mod: PBBFAC,,, | Performed by: STUDENT IN AN ORGANIZED HEALTH CARE EDUCATION/TRAINING PROGRAM

## 2019-04-25 RX ORDER — TRIAMCINOLONE ACETONIDE 1 MG/G
CREAM TOPICAL
Refills: 1 | COMMUNITY
Start: 2019-03-27 | End: 2019-06-18

## 2019-04-25 RX ORDER — FOLIC ACID 1 MG/1
TABLET ORAL
Qty: 120 TABLET | Refills: 0 | Status: SHIPPED | OUTPATIENT
Start: 2019-04-25 | End: 2019-06-27 | Stop reason: SDUPTHER

## 2019-04-25 RX ORDER — VIT C/E/ZN/COPPR/LUTEIN/ZEAXAN 250MG-90MG
2000 CAPSULE ORAL DAILY
Qty: 60 CAPSULE | Refills: 11 | COMMUNITY
Start: 2019-04-25 | End: 2019-05-25

## 2019-04-25 RX ORDER — PREDNISONE 10 MG/1
30 TABLET ORAL DAILY
Qty: 90 TABLET | Refills: 0 | Status: SHIPPED | OUTPATIENT
Start: 2019-04-25 | End: 2019-05-25

## 2019-04-25 RX ORDER — METHYLPREDNISOLONE 16 MG/1
TABLET ORAL
COMMUNITY
Start: 2019-03-08 | End: 2019-04-25

## 2019-04-25 NOTE — PATIENT INSTRUCTIONS
Please discontinue medrol   Start taking prednisone 30mg daily (3 tablets) x 2 weeks and then 20mg until follow up     Continue taking leucovorin daily   - folic acid 4mg daily   - Vit D    - atorvaquone daily

## 2019-04-26 ENCOUNTER — DOCUMENTATION ONLY (OUTPATIENT)
Dept: RADIATION ONCOLOGY | Facility: CLINIC | Age: 59
End: 2019-04-26

## 2019-04-26 DIAGNOSIS — Z17.1 MALIGNANT NEOPLASM OF UPPER-OUTER QUADRANT OF LEFT BREAST IN FEMALE, ESTROGEN RECEPTOR NEGATIVE: Primary | Chronic | ICD-10-CM

## 2019-04-26 DIAGNOSIS — C50.412 MALIGNANT NEOPLASM OF UPPER-OUTER QUADRANT OF LEFT BREAST IN FEMALE, ESTROGEN RECEPTOR NEGATIVE: Primary | Chronic | ICD-10-CM

## 2019-04-26 PROCEDURE — 77385 HC IMRT, SIMPLE: CPT | Performed by: RADIOLOGY

## 2019-04-26 PROCEDURE — 77387 PR GUIDANCE FOR RADIATION TREATMENT DELIVERY: ICD-10-PCS | Mod: 26,,, | Performed by: RADIOLOGY

## 2019-04-26 PROCEDURE — 77387 GUIDANCE FOR RADJ TX DLVR: CPT | Mod: 26,,, | Performed by: RADIOLOGY

## 2019-04-26 PROCEDURE — 77336 RADIATION PHYSICS CONSULT: CPT | Performed by: RADIOLOGY

## 2019-04-26 RX ORDER — SUCRALFATE 1 G/10ML
1 SUSPENSION ORAL 4 TIMES DAILY
Qty: 414 ML | Refills: 0 | Status: SHIPPED | OUTPATIENT
Start: 2019-04-26 | End: 2019-05-17 | Stop reason: SDUPTHER

## 2019-04-26 NOTE — PLAN OF CARE
Problem: Adult Inpatient Plan of Care  Goal: Plan of Care Review  Outcome: Ongoing (interventions implemented as appropriate)  Day 19 of outpatient XRT to the left breast mound. Doing well. Skin intact. C/o heartburn. Still hoarse voice. Using peg tube.

## 2019-04-29 PROCEDURE — 77387 PR GUIDANCE FOR RADIATION TREATMENT DELIVERY: ICD-10-PCS | Mod: 26,,, | Performed by: RADIOLOGY

## 2019-04-29 PROCEDURE — 77387 GUIDANCE FOR RADJ TX DLVR: CPT | Mod: 26,,, | Performed by: RADIOLOGY

## 2019-04-29 PROCEDURE — 77385 HC IMRT, SIMPLE: CPT | Performed by: RADIOLOGY

## 2019-04-30 PROCEDURE — 77385 HC IMRT, SIMPLE: CPT | Performed by: RADIOLOGY

## 2019-04-30 PROCEDURE — 77417 THER RADIOLOGY PORT IMAGE(S): CPT | Performed by: RADIOLOGY

## 2019-04-30 PROCEDURE — 77387 GUIDANCE FOR RADJ TX DLVR: CPT | Mod: 26,,, | Performed by: RADIOLOGY

## 2019-04-30 PROCEDURE — 77387 PR GUIDANCE FOR RADIATION TREATMENT DELIVERY: ICD-10-PCS | Mod: 26,,, | Performed by: RADIOLOGY

## 2019-05-01 ENCOUNTER — HOSPITAL ENCOUNTER (OUTPATIENT)
Dept: RADIATION THERAPY | Facility: HOSPITAL | Age: 59
Discharge: HOME | End: 2019-05-01
Attending: RADIOLOGY
Payer: COMMERCIAL

## 2019-05-01 ENCOUNTER — PATIENT MESSAGE (OUTPATIENT)
Dept: RHEUMATOLOGY | Facility: CLINIC | Age: 59
End: 2019-05-01

## 2019-05-01 PROCEDURE — 77387 PR GUIDANCE FOR RADIATION TREATMENT DELIVERY: ICD-10-PCS | Mod: 26,,, | Performed by: RADIOLOGY

## 2019-05-01 PROCEDURE — 77385 HC IMRT, SIMPLE: CPT | Performed by: RADIOLOGY

## 2019-05-01 PROCEDURE — 77387 GUIDANCE FOR RADJ TX DLVR: CPT | Mod: 26,,, | Performed by: RADIOLOGY

## 2019-05-01 NOTE — PROGRESS NOTES
Subjective:       Patient ID: Ermelinda Verde is a 58 y.o. female.    Chief Complaint: No chief complaint on file.    HPI  Mrs Verde is  a very pleasant 58-year-old -American female who returns for a diagnosis of recurrent triple negative left breast cancer.    She is S/P 3 cycles of nab-paclitaxel and Atezolizumab.  Last treatment was January 3, 2019.    Her treatment was complicated by the development of dermatomyositis which resulted in the lengthy hospitalization from January 22nd to February 13th.  She then was transferred to the rehab unit and was discharged last week.  She has been treated with steroids, IVIG, and low-dose methotrexate.  She is currently taking oral prednisone 30 mg daily.    PET-CT in March showed complete response.                Onc History:  She developed a palpable abnormality in her left breast in January 2017 which she noted on self-examination.  A diagnostic mammogram on January 19 showed a greater than 1 cm nodule in the upper outer portion of left breast.  By ultrasound this was lobulated and hypoechoic measuring 1.75 x 1.51 x 1.96 cm.     On January 24, 2017 a core needle biopsy was performed which showed infiltrating ductal carcinoma, high grade.  The tumor was ER negative, MN negative, and HER-2 negative.  A follow-up ultrasound on December 6 showed 2.5 x 2.2 x 1.5 cm left breast mass.  There was no abnormality noted in the left axilla.     She underwent sentinel lymph node biopsy on February 22.  That showed 4 negative lymph nodes.     She had 4 cycles of  Wilbur-adjuvantTaxotere and Cytoxan completed  on 5/9/17.     On June 26 she underwent left mastectomy.  That revealed 2 foci of invasive high-grade carcinoma measuring 14 mm and 1.5 mm.  Margins were negative.    She completed 4 cycles of adjuvant Adriamycin on September 12, 2017.      In October 2018, she developed some left supraclavicular lymphadenopathy which turned out to be recurrence.     A fine-needle  aspirate of the lymph node was performed on October 19th.  That showed metastatic carcinoma consistent with breast primary which was ER negative, [place] negative and HER 2-negative.    She then received 3 cycles of Nab paclitaxel and atezolizumab.  She last received treatment on January 3, 2019.  Review of Systems   Constitutional: Positive for activity change (Decreased) and fatigue. Negative for appetite change, fever and unexpected weight change.   HENT: Positive for trouble swallowing and voice change.    Eyes: Negative for visual disturbance.   Respiratory: Positive for cough. Negative for shortness of breath.    Cardiovascular: Negative for chest pain.   Gastrointestinal: Negative.  Negative for abdominal pain and diarrhea.   Genitourinary: Negative.  Negative for frequency.   Musculoskeletal: Negative for back pain.   Skin: Negative for rash.   Neurological: Positive for weakness (Improving). Negative for headaches.   Hematological: Negative for adenopathy.   Psychiatric/Behavioral: Negative for dysphoric mood. The patient is not nervous/anxious.        Objective:      Physical Exam   Constitutional: She is oriented to person, place, and time. She appears well-developed and well-nourished.        HENT:   Mouth/Throat: No oropharyngeal exudate.   Facial swelling, slight fullness in the neck but no palpable adenopathy   Eyes: No scleral icterus.   Neck:   Slight fullness   Cardiovascular: Normal rate and regular rhythm.   Pulmonary/Chest: Effort normal and breath sounds normal. No respiratory distress. Right breast exhibits no mass, no nipple discharge and no skin change.       Abdominal: Soft. She exhibits no mass. There is no tenderness.   Musculoskeletal:        Lymphadenopathy:     She has no cervical adenopathy.   Neurological: She is alert and oriented to person, place, and time.   Skin: Skin is dry.   Slightly hyperpigmented scaly skin especially in distal lower extremities   Psychiatric: She has a normal  mood and affect. Her behavior is normal. Thought content normal.   Vitals reviewed.      Assessment:     CBC shows a white count of 3040 hemoglobin 10.9 and platelet count is 224564  1. Malignant neoplasm of upper-outer quadrant of left breast in female, estrogen receptor negative    2. Polymyositis associated with autoimmune disease       Plan:       Return 1 month with labs.  Tessalon Perles and hydrocodone cough syrup.    Distress Screening Results: Psychosocial Distress screening score of Distress Score: 2 noted and reviewed. No intervention indicated.

## 2019-05-02 ENCOUNTER — LAB VISIT (OUTPATIENT)
Dept: LAB | Facility: HOSPITAL | Age: 59
End: 2019-05-02
Attending: INTERNAL MEDICINE
Payer: COMMERCIAL

## 2019-05-02 ENCOUNTER — OFFICE VISIT (OUTPATIENT)
Dept: HEMATOLOGY/ONCOLOGY | Facility: CLINIC | Age: 59
End: 2019-05-02
Payer: COMMERCIAL

## 2019-05-02 ENCOUNTER — DOCUMENTATION ONLY (OUTPATIENT)
Dept: RADIATION ONCOLOGY | Facility: CLINIC | Age: 59
End: 2019-05-02

## 2019-05-02 VITALS
WEIGHT: 171.75 LBS | HEIGHT: 65 IN | SYSTOLIC BLOOD PRESSURE: 132 MMHG | HEART RATE: 105 BPM | DIASTOLIC BLOOD PRESSURE: 79 MMHG | TEMPERATURE: 99 F | RESPIRATION RATE: 18 BRPM | OXYGEN SATURATION: 100 % | BODY MASS INDEX: 28.61 KG/M2

## 2019-05-02 DIAGNOSIS — Z17.1 MALIGNANT NEOPLASM OF UPPER-OUTER QUADRANT OF LEFT BREAST IN FEMALE, ESTROGEN RECEPTOR NEGATIVE: Chronic | ICD-10-CM

## 2019-05-02 DIAGNOSIS — Z17.1 MALIGNANT NEOPLASM OF UPPER-OUTER QUADRANT OF LEFT BREAST IN FEMALE, ESTROGEN RECEPTOR NEGATIVE: Primary | Chronic | ICD-10-CM

## 2019-05-02 DIAGNOSIS — C50.412 MALIGNANT NEOPLASM OF UPPER-OUTER QUADRANT OF LEFT BREAST IN FEMALE, ESTROGEN RECEPTOR NEGATIVE: Primary | ICD-10-CM

## 2019-05-02 DIAGNOSIS — C50.412 MALIGNANT NEOPLASM OF UPPER-OUTER QUADRANT OF LEFT BREAST IN FEMALE, ESTROGEN RECEPTOR NEGATIVE: Primary | Chronic | ICD-10-CM

## 2019-05-02 DIAGNOSIS — C50.412 MALIGNANT NEOPLASM OF UPPER-OUTER QUADRANT OF LEFT BREAST IN FEMALE, ESTROGEN RECEPTOR NEGATIVE: Chronic | ICD-10-CM

## 2019-05-02 DIAGNOSIS — Z17.1 MALIGNANT NEOPLASM OF UPPER-OUTER QUADRANT OF LEFT BREAST IN FEMALE, ESTROGEN RECEPTOR NEGATIVE: Primary | ICD-10-CM

## 2019-05-02 DIAGNOSIS — R05.9 COUGH: ICD-10-CM

## 2019-05-02 DIAGNOSIS — M35.9 POLYMYOSITIS ASSOCIATED WITH AUTOIMMUNE DISEASE: ICD-10-CM

## 2019-05-02 DIAGNOSIS — M33.20 POLYMYOSITIS ASSOCIATED WITH AUTOIMMUNE DISEASE: ICD-10-CM

## 2019-05-02 LAB
ALBUMIN SERPL BCP-MCNC: 3 G/DL (ref 3.5–5.2)
ALP SERPL-CCNC: 52 U/L (ref 55–135)
ALT SERPL W/O P-5'-P-CCNC: 19 U/L (ref 10–44)
ANION GAP SERPL CALC-SCNC: 7 MMOL/L (ref 8–16)
AST SERPL-CCNC: 38 U/L (ref 10–40)
BILIRUB SERPL-MCNC: 0.3 MG/DL (ref 0.1–1)
BUN SERPL-MCNC: 10 MG/DL (ref 6–20)
CALCIUM SERPL-MCNC: 9.5 MG/DL (ref 8.7–10.5)
CHLORIDE SERPL-SCNC: 104 MMOL/L (ref 95–110)
CO2 SERPL-SCNC: 28 MMOL/L (ref 23–29)
CREAT SERPL-MCNC: 0.7 MG/DL (ref 0.5–1.4)
ERYTHROCYTE [DISTWIDTH] IN BLOOD BY AUTOMATED COUNT: 16.9 % (ref 11.5–14.5)
EST. GFR  (AFRICAN AMERICAN): >60 ML/MIN/1.73 M^2
EST. GFR  (NON AFRICAN AMERICAN): >60 ML/MIN/1.73 M^2
GLUCOSE SERPL-MCNC: 80 MG/DL (ref 70–110)
HCT VFR BLD AUTO: 36.8 % (ref 37–48.5)
HGB BLD-MCNC: 10.9 G/DL (ref 12–16)
IMM GRANULOCYTES # BLD AUTO: 0.01 K/UL (ref 0–0.04)
MCH RBC QN AUTO: 26.3 PG (ref 27–31)
MCHC RBC AUTO-ENTMCNC: 29.6 G/DL (ref 32–36)
MCV RBC AUTO: 89 FL (ref 82–98)
NEUTROPHILS # BLD AUTO: 2.4 K/UL (ref 1.8–7.7)
PLATELET # BLD AUTO: 116 K/UL (ref 150–350)
PMV BLD AUTO: 9.6 FL (ref 9.2–12.9)
POTASSIUM SERPL-SCNC: 3.5 MMOL/L (ref 3.5–5.1)
PROT SERPL-MCNC: 7.4 G/DL (ref 6–8.4)
RBC # BLD AUTO: 4.15 M/UL (ref 4–5.4)
SODIUM SERPL-SCNC: 139 MMOL/L (ref 136–145)
WBC # BLD AUTO: 3.04 K/UL (ref 3.9–12.7)

## 2019-05-02 PROCEDURE — 77387 GUIDANCE FOR RADJ TX DLVR: CPT | Mod: 26,,, | Performed by: RADIOLOGY

## 2019-05-02 PROCEDURE — 3008F PR BODY MASS INDEX (BMI) DOCUMENTED: ICD-10-PCS | Mod: CPTII,S$GLB,, | Performed by: INTERNAL MEDICINE

## 2019-05-02 PROCEDURE — 36415 COLL VENOUS BLD VENIPUNCTURE: CPT

## 2019-05-02 PROCEDURE — 77387 PR GUIDANCE FOR RADIATION TREATMENT DELIVERY: ICD-10-PCS | Mod: 26,,, | Performed by: RADIOLOGY

## 2019-05-02 PROCEDURE — 80053 COMPREHEN METABOLIC PANEL: CPT

## 2019-05-02 PROCEDURE — 3078F PR MOST RECENT DIASTOLIC BLOOD PRESSURE < 80 MM HG: ICD-10-PCS | Mod: CPTII,S$GLB,, | Performed by: INTERNAL MEDICINE

## 2019-05-02 PROCEDURE — 3008F BODY MASS INDEX DOCD: CPT | Mod: CPTII,S$GLB,, | Performed by: INTERNAL MEDICINE

## 2019-05-02 PROCEDURE — 99214 PR OFFICE/OUTPT VISIT, EST, LEVL IV, 30-39 MIN: ICD-10-PCS | Mod: S$GLB,,, | Performed by: INTERNAL MEDICINE

## 2019-05-02 PROCEDURE — 99214 OFFICE O/P EST MOD 30 MIN: CPT | Mod: S$GLB,,, | Performed by: INTERNAL MEDICINE

## 2019-05-02 PROCEDURE — 3078F DIAST BP <80 MM HG: CPT | Mod: CPTII,S$GLB,, | Performed by: INTERNAL MEDICINE

## 2019-05-02 PROCEDURE — 77385 HC IMRT, SIMPLE: CPT | Performed by: RADIOLOGY

## 2019-05-02 PROCEDURE — 3075F SYST BP GE 130 - 139MM HG: CPT | Mod: CPTII,S$GLB,, | Performed by: INTERNAL MEDICINE

## 2019-05-02 PROCEDURE — 85027 COMPLETE CBC AUTOMATED: CPT

## 2019-05-02 PROCEDURE — 99999 PR PBB SHADOW E&M-EST. PATIENT-LVL III: CPT | Mod: PBBFAC,,, | Performed by: INTERNAL MEDICINE

## 2019-05-02 PROCEDURE — 99999 PR PBB SHADOW E&M-EST. PATIENT-LVL III: ICD-10-PCS | Mod: PBBFAC,,, | Performed by: INTERNAL MEDICINE

## 2019-05-02 PROCEDURE — 3075F PR MOST RECENT SYSTOLIC BLOOD PRESS GE 130-139MM HG: ICD-10-PCS | Mod: CPTII,S$GLB,, | Performed by: INTERNAL MEDICINE

## 2019-05-02 RX ORDER — BENZONATATE 100 MG/1
100 CAPSULE ORAL EVERY 6 HOURS PRN
Qty: 30 CAPSULE | Refills: 1 | Status: SHIPPED | OUTPATIENT
Start: 2019-05-02 | End: 2019-06-18

## 2019-05-02 RX ORDER — HYDROCODONE BITARTRATE AND HOMATROPINE METHYLBROMIDE ORAL SOLUTION 5; 1.5 MG/5ML; MG/5ML
5 LIQUID ORAL EVERY 4 HOURS PRN
Qty: 240 ML | Refills: 0 | Status: SHIPPED | OUTPATIENT
Start: 2019-05-02 | End: 2019-05-06 | Stop reason: SDUPTHER

## 2019-05-02 RX ORDER — NYSTATIN 100000 [USP'U]/ML
6 SUSPENSION ORAL 4 TIMES DAILY
Qty: 240 ML | Refills: 2 | Status: SHIPPED | OUTPATIENT
Start: 2019-05-03 | End: 2019-05-13

## 2019-05-02 NOTE — PLAN OF CARE
Problem: Adult Inpatient Plan of Care  Goal: Plan of Care Review  Outcome: Ongoing (interventions implemented as appropriate)  Day 23 of outpatient XRT to the left breast. Hyperpigmentation of the skin. Skin intact. Using Miaderm. C/O odynophagia.

## 2019-05-03 PROCEDURE — 77387 PR GUIDANCE FOR RADIATION TREATMENT DELIVERY: ICD-10-PCS | Mod: 26,,, | Performed by: RADIOLOGY

## 2019-05-03 PROCEDURE — 77385 HC IMRT, SIMPLE: CPT | Performed by: RADIOLOGY

## 2019-05-03 PROCEDURE — 77387 GUIDANCE FOR RADJ TX DLVR: CPT | Mod: 26,,, | Performed by: RADIOLOGY

## 2019-05-06 ENCOUNTER — INFUSION (OUTPATIENT)
Dept: INFUSION THERAPY | Facility: HOSPITAL | Age: 59
End: 2019-05-06
Attending: INTERNAL MEDICINE
Payer: COMMERCIAL

## 2019-05-06 ENCOUNTER — PATIENT MESSAGE (OUTPATIENT)
Dept: HEMATOLOGY/ONCOLOGY | Facility: CLINIC | Age: 59
End: 2019-05-06

## 2019-05-06 VITALS
WEIGHT: 171.75 LBS | HEIGHT: 65 IN | DIASTOLIC BLOOD PRESSURE: 78 MMHG | RESPIRATION RATE: 18 BRPM | SYSTOLIC BLOOD PRESSURE: 137 MMHG | TEMPERATURE: 99 F | OXYGEN SATURATION: 98 % | BODY MASS INDEX: 28.61 KG/M2 | HEART RATE: 96 BPM

## 2019-05-06 DIAGNOSIS — M33.13 DERMATOMYOSITIS: Primary | ICD-10-CM

## 2019-05-06 DIAGNOSIS — R05.9 COUGH: ICD-10-CM

## 2019-05-06 DIAGNOSIS — R13.19 ESOPHAGEAL DYSPHAGIA: ICD-10-CM

## 2019-05-06 PROCEDURE — 25000003 PHARM REV CODE 250: Performed by: INTERNAL MEDICINE

## 2019-05-06 PROCEDURE — 77385 HC IMRT, SIMPLE: CPT | Performed by: RADIOLOGY

## 2019-05-06 PROCEDURE — 63600175 PHARM REV CODE 636 W HCPCS: Mod: JG | Performed by: INTERNAL MEDICINE

## 2019-05-06 PROCEDURE — 77336 RADIATION PHYSICS CONSULT: CPT | Performed by: RADIOLOGY

## 2019-05-06 PROCEDURE — 96365 THER/PROPH/DIAG IV INF INIT: CPT

## 2019-05-06 PROCEDURE — 96366 THER/PROPH/DIAG IV INF ADDON: CPT

## 2019-05-06 PROCEDURE — 77387 GUIDANCE FOR RADJ TX DLVR: CPT | Mod: 26,,, | Performed by: RADIOLOGY

## 2019-05-06 PROCEDURE — 96375 TX/PRO/DX INJ NEW DRUG ADDON: CPT

## 2019-05-06 PROCEDURE — 77387 PR GUIDANCE FOR RADIATION TREATMENT DELIVERY: ICD-10-PCS | Mod: 26,,, | Performed by: RADIOLOGY

## 2019-05-06 PROCEDURE — 96367 TX/PROPH/DG ADDL SEQ IV INF: CPT

## 2019-05-06 PROCEDURE — A4216 STERILE WATER/SALINE, 10 ML: HCPCS | Performed by: INTERNAL MEDICINE

## 2019-05-06 PROCEDURE — S0028 INJECTION, FAMOTIDINE, 20 MG: HCPCS | Performed by: INTERNAL MEDICINE

## 2019-05-06 RX ORDER — HYDROCODONE BITARTRATE AND HOMATROPINE METHYLBROMIDE ORAL SOLUTION 5; 1.5 MG/5ML; MG/5ML
5 LIQUID ORAL EVERY 4 HOURS PRN
Qty: 240 ML | Refills: 0 | Status: SHIPPED | OUTPATIENT
Start: 2019-05-06 | End: 2019-06-18

## 2019-05-06 RX ORDER — FAMOTIDINE 10 MG/ML
20 INJECTION INTRAVENOUS
Status: COMPLETED | OUTPATIENT
Start: 2019-05-06 | End: 2019-05-06

## 2019-05-06 RX ORDER — SODIUM CHLORIDE 0.9 % (FLUSH) 0.9 %
10 SYRINGE (ML) INJECTION
Status: DISCONTINUED | OUTPATIENT
Start: 2019-05-06 | End: 2019-05-06 | Stop reason: HOSPADM

## 2019-05-06 RX ORDER — ACETAMINOPHEN 325 MG/1
650 TABLET ORAL
Status: COMPLETED | OUTPATIENT
Start: 2019-05-06 | End: 2019-05-06

## 2019-05-06 RX ORDER — FAMOTIDINE 10 MG/ML
20 INJECTION INTRAVENOUS
Status: CANCELLED | OUTPATIENT
Start: 2019-05-27

## 2019-05-06 RX ORDER — ACETAMINOPHEN 325 MG/1
650 TABLET ORAL
Status: CANCELLED | OUTPATIENT
Start: 2019-05-27

## 2019-05-06 RX ORDER — HEPARIN 100 UNIT/ML
500 SYRINGE INTRAVENOUS
Status: DISCONTINUED | OUTPATIENT
Start: 2019-05-06 | End: 2019-05-06 | Stop reason: HOSPADM

## 2019-05-06 RX ORDER — HEPARIN 100 UNIT/ML
500 SYRINGE INTRAVENOUS
Status: CANCELLED | OUTPATIENT
Start: 2019-05-27

## 2019-05-06 RX ORDER — SODIUM CHLORIDE 0.9 % (FLUSH) 0.9 %
10 SYRINGE (ML) INJECTION
Status: CANCELLED | OUTPATIENT
Start: 2019-05-27

## 2019-05-06 RX ADMIN — HUMAN IMMUNOGLOBULIN G 80 G: 40 LIQUID INTRAVENOUS at 08:05

## 2019-05-06 RX ADMIN — ACETAMINOPHEN 650 MG: 325 TABLET ORAL at 07:05

## 2019-05-06 RX ADMIN — FAMOTIDINE 20 MG: 10 INJECTION, SOLUTION INTRAVENOUS at 08:05

## 2019-05-06 RX ADMIN — DIPHENHYDRAMINE HYDROCHLORIDE 50 MG: 50 INJECTION, SOLUTION INTRAMUSCULAR; INTRAVENOUS at 07:05

## 2019-05-06 RX ADMIN — Medication 10 ML: at 11:05

## 2019-05-06 RX ADMIN — HEPARIN 500 UNITS: 100 SYRINGE at 11:05

## 2019-05-06 NOTE — PLAN OF CARE
Problem: Adult Inpatient Plan of Care  Goal: Plan of Care Review  Outcome: Ongoing (interventions implemented as appropriate)  Pt tolerated IVIG infusion well, with no complications or s/s of adverse reaction. VS stable throughout treatment. Right chest port positive for blood return, flushed with normal saline and heparin prior to discharge. Port secured and left accessed for treatment tomorrow 5/7/19. Pt discharged with no distress noted and ambulated indepedently out of infusion center accompanied by . AVS printed and given to pt.

## 2019-05-07 ENCOUNTER — INFUSION (OUTPATIENT)
Dept: INFUSION THERAPY | Facility: HOSPITAL | Age: 59
End: 2019-05-07
Attending: INTERNAL MEDICINE
Payer: COMMERCIAL

## 2019-05-07 ENCOUNTER — CLINICAL SUPPORT (OUTPATIENT)
Dept: HEMATOLOGY/ONCOLOGY | Facility: CLINIC | Age: 59
End: 2019-05-07
Payer: COMMERCIAL

## 2019-05-07 VITALS — BODY MASS INDEX: 29.51 KG/M2 | WEIGHT: 177.13 LBS | HEIGHT: 65 IN

## 2019-05-07 VITALS
RESPIRATION RATE: 18 BRPM | SYSTOLIC BLOOD PRESSURE: 150 MMHG | HEART RATE: 92 BPM | TEMPERATURE: 98 F | DIASTOLIC BLOOD PRESSURE: 84 MMHG

## 2019-05-07 DIAGNOSIS — M33.13 DERMATOMYOSITIS: Primary | ICD-10-CM

## 2019-05-07 DIAGNOSIS — C50.412 MALIGNANT NEOPLASM OF UPPER-OUTER QUADRANT OF LEFT FEMALE BREAST, UNSPECIFIED ESTROGEN RECEPTOR STATUS: ICD-10-CM

## 2019-05-07 DIAGNOSIS — R13.19 ESOPHAGEAL DYSPHAGIA: ICD-10-CM

## 2019-05-07 DIAGNOSIS — Z71.3 NUTRITIONAL COUNSELING: Primary | ICD-10-CM

## 2019-05-07 PROCEDURE — 77385 HC IMRT, SIMPLE: CPT | Performed by: RADIOLOGY

## 2019-05-07 PROCEDURE — S0028 INJECTION, FAMOTIDINE, 20 MG: HCPCS | Performed by: INTERNAL MEDICINE

## 2019-05-07 PROCEDURE — 96366 THER/PROPH/DIAG IV INF ADDON: CPT

## 2019-05-07 PROCEDURE — 25000003 PHARM REV CODE 250: Performed by: INTERNAL MEDICINE

## 2019-05-07 PROCEDURE — 96375 TX/PRO/DX INJ NEW DRUG ADDON: CPT

## 2019-05-07 PROCEDURE — 97802 MEDICAL NUTRITION INDIV IN: CPT | Mod: S$GLB,,, | Performed by: DIETITIAN, REGISTERED

## 2019-05-07 PROCEDURE — 77417 THER RADIOLOGY PORT IMAGE(S): CPT | Performed by: RADIOLOGY

## 2019-05-07 PROCEDURE — 96367 TX/PROPH/DG ADDL SEQ IV INF: CPT

## 2019-05-07 PROCEDURE — 77387 GUIDANCE FOR RADJ TX DLVR: CPT | Mod: 26,,, | Performed by: RADIOLOGY

## 2019-05-07 PROCEDURE — 77387 PR GUIDANCE FOR RADIATION TREATMENT DELIVERY: ICD-10-PCS | Mod: 26,,, | Performed by: RADIOLOGY

## 2019-05-07 PROCEDURE — A4216 STERILE WATER/SALINE, 10 ML: HCPCS | Performed by: INTERNAL MEDICINE

## 2019-05-07 PROCEDURE — 96365 THER/PROPH/DIAG IV INF INIT: CPT

## 2019-05-07 PROCEDURE — 99999 PR PBB SHADOW E&M-EST. PATIENT-LVL II: CPT | Mod: PBBFAC,,, | Performed by: DIETITIAN, REGISTERED

## 2019-05-07 PROCEDURE — 63600175 PHARM REV CODE 636 W HCPCS: Performed by: INTERNAL MEDICINE

## 2019-05-07 PROCEDURE — 97802 PR MED NUTR THER, 1ST, INDIV, EA 15 MIN: ICD-10-PCS | Mod: S$GLB,,, | Performed by: DIETITIAN, REGISTERED

## 2019-05-07 PROCEDURE — 99999 PR PBB SHADOW E&M-EST. PATIENT-LVL II: ICD-10-PCS | Mod: PBBFAC,,, | Performed by: DIETITIAN, REGISTERED

## 2019-05-07 RX ORDER — SODIUM CHLORIDE 0.9 % (FLUSH) 0.9 %
10 SYRINGE (ML) INJECTION
Status: DISCONTINUED | OUTPATIENT
Start: 2019-05-07 | End: 2019-05-07 | Stop reason: HOSPADM

## 2019-05-07 RX ORDER — SODIUM CHLORIDE 0.9 % (FLUSH) 0.9 %
10 SYRINGE (ML) INJECTION
Status: CANCELLED | OUTPATIENT
Start: 2019-06-03

## 2019-05-07 RX ORDER — FAMOTIDINE 10 MG/ML
20 INJECTION INTRAVENOUS
Status: CANCELLED | OUTPATIENT
Start: 2019-06-03

## 2019-05-07 RX ORDER — ACETAMINOPHEN 325 MG/1
650 TABLET ORAL
Status: CANCELLED | OUTPATIENT
Start: 2019-06-03

## 2019-05-07 RX ORDER — HEPARIN 100 UNIT/ML
500 SYRINGE INTRAVENOUS
Status: DISCONTINUED | OUTPATIENT
Start: 2019-05-07 | End: 2019-05-07 | Stop reason: HOSPADM

## 2019-05-07 RX ORDER — ACETAMINOPHEN 325 MG/1
650 TABLET ORAL
Status: COMPLETED | OUTPATIENT
Start: 2019-05-07 | End: 2019-05-07

## 2019-05-07 RX ORDER — HEPARIN 100 UNIT/ML
500 SYRINGE INTRAVENOUS
Status: CANCELLED | OUTPATIENT
Start: 2019-06-03

## 2019-05-07 RX ORDER — FAMOTIDINE 10 MG/ML
20 INJECTION INTRAVENOUS
Status: COMPLETED | OUTPATIENT
Start: 2019-05-07 | End: 2019-05-07

## 2019-05-07 RX ADMIN — HEPARIN 500 UNITS: 100 SYRINGE at 01:05

## 2019-05-07 RX ADMIN — DIPHENHYDRAMINE HYDROCHLORIDE 50 MG: 50 INJECTION, SOLUTION INTRAMUSCULAR; INTRAVENOUS at 08:05

## 2019-05-07 RX ADMIN — ACETAMINOPHEN 650 MG: 325 TABLET ORAL at 08:05

## 2019-05-07 RX ADMIN — SODIUM CHLORIDE: 0.9 INJECTION, SOLUTION INTRAVENOUS at 08:05

## 2019-05-07 RX ADMIN — FAMOTIDINE 20 MG: 10 INJECTION, SOLUTION INTRAVENOUS at 08:05

## 2019-05-07 RX ADMIN — HUMAN IMMUNOGLOBULIN G 80 G: 40 LIQUID INTRAVENOUS at 09:05

## 2019-05-07 RX ADMIN — Medication 10 ML: at 01:05

## 2019-05-07 NOTE — PROGRESS NOTES
"Medical Nutrition Therapy Oncology Progress Note    Name: Ermelinda Verde MRN: [id number]  : 1960    Age: 58 y.o.  Ethnicity: /Black Language: English    Diagnosis: No diagnosis found.    Chemo Regimen:    Referring MD: Dr. Campos Frequency:  Radiation: Yes           Goal of Cancer treatment n/a         Nutrition Assessment     Chief Complaint:   Chief Complaint   Patient presents with    Nutrition Counseling    Breast Cancer        Anthropometric Measurements:  Height: 5' 5" (1.651 m)  Current Weight: 80.3 kg (177 lb 1.6 oz)  Ideal Body Weight: 125#  Percent Ideal Body Weight:: 141%  BMI: Body mass index is 29.47 kg/m².     Weight History:   Wt Readings from Last 8 Encounters:   19 80.3 kg (177 lb 1.6 oz)   19 77.9 kg (171 lb 11.8 oz)   19 77.9 kg (171 lb 11.8 oz)   19 79.7 kg (175 lb 11.3 oz)   19 78 kg (171 lb 15.3 oz)   19 77.1 kg (169 lb 15.6 oz)   19 75.4 kg (166 lb 1.9 oz)   19 76.5 kg (168 lb 9.6 oz)     Medical Health History:  Feeding tube placed: Yes - PEG  Pre-treatment: No    Past Surgical History:   Procedure Laterality Date    BIOPSY-SENTINEL NODE Left 2017    Performed by Alfredo English MD at [facility] OR    BREAST BIOPSY Left     BREAST RECONSTRUCTION Left 2017     SECTION      COLONOSCOPY  2011    repeat in 10 yrs.    D&C      EGD (ESOPHAGOGASTRODUODENOSCOPY) N/A 2019    Performed by Pernell Rosas MD at [facility] ENDO (2ND FLR)    INSERTION, PEG TUBE N/A 2019    Performed by Zurdo Gordon MD at [facility] ENDO (2ND FLR)    ZRCOWEEWG-QYPL-L-CATH Right 10/31/2018    Performed by Deo Palencia MD at [facility] OR 2ND FLR    QFAIWPNLS-TPKF-Y-CATH Right 2017    Performed by Alfredo English MD at [facility] OR    RPGEDDBGA-GPSL-E-CATH-neck or chest Fluoro needed Consent AM of surgery Right 10/30/2018    Performed by Deo Palencia MD at [facility] OR 2ND FLR    MASTECTOMY Left 2017    MASTECTOMY Left " 6/26/2017    Per[facility] at [facility]    MYOMECTOMY      PORTACATH PLACEMENT      RECONSTRUCTION-BREAST/FLAP-SCOTT Left 6/26/2017    Performed by [facility]uk[facility]jinder Sewell MD at [facility] OR    SENTINEL LYMPH NODE BIOPSY  02/2017    left    TOTAL REDUCTION MAMMOPLASTY Right 2017        Medications:   Current Outpatient Medications:     (Magic mouthwash) 1:1:1 Benadryl 12.5mg/5ml liq, aluminum & magnesium hydroxide-simehticone (Maalox), lidocaine viscous 2%, Swish and spit 10 mLs every 4 (four) hours as needed. for mouth sores, Disp: 450 mL, Rfl: 0    acetaminophen (TYLENOL) 325 MG tablet, 650 mg by Tube route every 4 (four) hours as needed for Pain., Disp: , Rfl:     atovaquone/proguanil HCl (ATOVAQUONE-PROGUANIL ORAL), , Disp: , Rfl:     benzonatate (TESSALON PERLES) 100 MG capsule, Take 1 capsule (100 mg total) by mouth every 6 (six) hours as needed for Cough., Disp: 30 capsule, Rfl: 1    cholecalciferol, vitamin D3, (VITAMIN D3) 1,000 unit capsule, Take 2 capsules (2,000 Units total) by mouth once daily., Disp: 60 capsule, Rfl: 11    fluticasone (FLONASE) 50 mcg/actuation nasal spray, SHAKE LIQUID AND USE 1 SPRAY(50 MCG) IN EACH NOSTRIL EVERY DAY, Disp: 16 mL, Rfl: 5    folic acid (FOLVITE) 1 MG tablet, TAKE 4 TABLETS(4 MG) VIA GTUBE EVERY DAY, Disp: 120 tablet, Rfl: 0    hydrocodone-homatropine 5-1.5 mg/5 ml (HYCODAN) 5-1.5 mg/5 mL Syrp, Take 5 mLs by mouth every 4 (four) hours as needed., Disp: 240 mL, Rfl: 0    leucovorin 5 mg/mL Susp, Take 1 mL (5 mg total) by mouth once daily., Disp: , Rfl:     nystatin (MYCOSTATIN) 100,000 unit/mL suspension, Take 6 mLs (600,000 Units total) by mouth 4 (four) times daily. for 10 days, Disp: 240 mL, Rfl: 2    omeprazole (PRILOSEC) 40 MG capsule, Take 1 capsule (40 mg total) by mouth 2 (two) times daily before meals., Disp: 60 capsule, Rfl: 11    predniSONE (DELTASONE) 10 MG tablet, Take 3 tablets (30 mg total) by mouth once daily. Please take 3 tablets x  2 weeks and then 2 therafter, Disp: 90 tablet, Rfl: 0    senna-docusate 8.6-50 mg (PERICOLACE) 8.6-50 mg per tablet, 2 tablets by Tube route daily with lunch., Disp: , Rfl:     sucralfate (CARAFATE) 100 mg/mL suspension, Take 10 mLs (1 g total) by mouth 4 (four) times daily., Disp: 414 mL, Rfl: 0    triamcinolone acetonide 0.1% (KENALOG) 0.1 % cream, STEVIE EXT AA BID FOR 7 DAYS, Disp: , Rfl: 1  No current facility-administered medications for this visit.     Last Labs:  Last Labs:  Glucose   Date Value Ref Range Status   05/02/2019 80 70 - 110 mg/dL Final   04/04/2019 82 70 - 110 mg/dL Final   04/04/2019 82 70 - 110 mg/dL Final   11/23/2016 91 74 - 106 MG/DL Final     BUN, Bld   Date Value Ref Range Status   05/02/2019 10 6 - 20 mg/dL Final   04/04/2019 14 6 - 20 mg/dL Final   04/04/2019 14 6 - 20 mg/dL Final     Creatinine   Date Value Ref Range Status   05/02/2019 0.7 0.5 - 1.4 mg/dL Final   04/04/2019 0.8 0.5 - 1.4 mg/dL Final   04/04/2019 0.8 0.5 - 1.4 mg/dL Final     Sodium   Date Value Ref Range Status   05/02/2019 139 136 - 145 mmol/L Final   04/04/2019 137 136 - 145 mmol/L Final   04/04/2019 137 136 - 145 mmol/L Final     Potassium   Date Value Ref Range Status   05/02/2019 3.5 3.5 - 5.1 mmol/L Final   04/04/2019 3.6 3.5 - 5.1 mmol/L Final   04/04/2019 3.6 3.5 - 5.1 mmol/L Final     Phosphorus   Date Value Ref Range Status   02/10/2019 1.9 (L) 2.7 - 4.5 mg/dL Final   02/09/2019 2.7 2.7 - 4.5 mg/dL Final     Calcium   Date Value Ref Range Status   05/02/2019 9.5 8.7 - 10.5 mg/dL Final   04/04/2019 9.2 8.7 - 10.5 mg/dL Final   04/04/2019 9.2 8.7 - 10.5 mg/dL Final     No results found for: PREALBUMIN  Total Protein   Date Value Ref Range Status   05/02/2019 7.4 6.0 - 8.4 g/dL Final   04/04/2019 9.0 (H) 6.0 - 8.4 g/dL Final   04/04/2019 9.0 (H) 6.0 - 8.4 g/dL Final     Cholesterol   Date Value Ref Range Status   04/04/2019 199 120 - 199 mg/dL Final     Comment:     The National Cholesterol Education Program  (NCEP) has set the  following guidelines (reference ranges) for Cholesterol:  Optimal.....................<200 mg/dL  Borderline High.............200-239 mg/dL  High........................> or = 240 mg/dL     01/23/2019 186 120 - 199 mg/dL Final     Comment:     The National Cholesterol Education Program (NCEP) has set the  following guidelines (reference ranges) for Cholesterol:  Optimal.....................<200 mg/dL  Borderline High.............200-239 mg/dL  High........................> or = 240 mg/dL       Hemoglobin A1C   Date Value Ref Range Status   01/22/2019 6.3 (H) 4.0 - 5.6 % Final     Comment:     ADA Screening Guidelines:  5.7-6.4%  Consistent with prediabetes  >or=6.5%  Consistent with diabetes  High levels of fetal hemoglobin interfere with the HbA1C  assay. Heterozygous hemoglobin variants (HbS, HgC, etc)do  not significantly interfere with this assay.   However, presence of multiple variants may affect accuracy.     10/24/2018 5.7 (H) 4.0 - 5.6 % Final     Comment:     ADA Screening Guidelines:  5.7-6.4%  Consistent with prediabetes  >or=6.5%  Consistent with diabetes  High levels of fetal hemoglobin interfere with the HbA1C  assay. Heterozygous hemoglobin variants (HbS, HgC, etc)do  not significantly interfere with this assay.   However, presence of multiple variants may affect accuracy.       Hemoglobin   Date Value Ref Range Status   05/02/2019 10.9 (L) 12.0 - 16.0 g/dL Final   04/04/2019 11.1 (L) 12.0 - 16.0 g/dL Final   04/04/2019 11.1 (L) 12.0 - 16.0 g/dL Final     Hematocrit   Date Value Ref Range Status   05/02/2019 36.8 (L) 37.0 - 48.5 % Final   04/04/2019 37.2 37.0 - 48.5 % Final   04/04/2019 37.2 37.0 - 48.5 % Final     Iron   Date Value Ref Range Status   02/04/2019 37 30 - 160 ug/dL Final   06/19/2018 65 30 - 160 ug/dL Final     No components found for: FROLATE  Vit D, 25-Hydroxy   Date Value Ref Range Status   03/08/2018 23 (L) 30 - 96 ng/mL Final     Comment:     Vitamin D  deficiency.........<10 ng/mL                              Vitamin D insufficiency......10-29 ng/mL       Vitamin D sufficiency........> or equal to 30 ng/mL  Vitamin D toxicity............>100 ng/mL       WBC   Date Value Ref Range Status   05/02/2019 3.04 (L) 3.90 - 12.70 K/uL Final   04/04/2019 3.11 (L) 3.90 - 12.70 K/uL Final   04/04/2019 3.11 (L) 3.90 - 12.70 K/uL Final         Client History/Food Access:    Living Situation: Lives with spouse   Who: Shops for Groceries? Patient and Spouse   Who : Prepares meals? Patient and Spouse   Who: Fills prescriptions? Patient and Spouse   Are there financial difficulties purchasing food? No   Personal History (occupation, physical activity level, exercise): Little to no activity       Baseline for Outcomes Monitoring  Food and Nutrition History: Pt here with spouse for nutrition counseling. Current weight of 177# which is up from 165# when she was in the hospital. Went to rehab after hospital. Has PEG--using 1-2 cans of Isosource 1.5 daily. Eating 3-4 other times throughout the day. Pt complains of sores in her throat and fatigue. Pt not limiting herself to soft foods, but notes which foods irritate her. Encouraged pt to start to ween off of tube feeding. Discussed replacing morning can of Isosource with an Ensure Plus by mouth. Pt can continue to flush tube with water. Encouraged exercise to help with fatigue. Also, pt to finish radiation this week so fatigue will hopefully start to improve. Pt has exercises that she learned from PT that she can do. Reviewed medications, supplement/herbal intake and no food/med interactions noted. Lab results noted. Compliance is fair. Comprehension is fair.  24-hour recall:  Breakfast: Isosource 1.5, banana  Lunch: soup or red beans and rice with sausage (1/2 portion at lunch and the other half later in the afternoon)  Snack: popsicle, ice cream cone, Ensure High Protein  Dinner: small portions of chicken and mac and  cheese    Nutritional Needs:  Estimated Needs Method Use    3370-7791 kcal/day [] Predictive Equation: [place]   [x]  25-28 kcal/kg (adjusted)   Protein 75 g 1.2 gm/kg/day   Fluid 1800 ml 28 ml/kg/day     Food/Nutrient Intake (oral, enteral or parenteral) and Patient Behaviors     Calorie intake: Adequate   Protein intake: Adequate     Yes/No    Yes Uses medical food supplements   Yes Cooking techniques to minimize fatigue   Yes Currently modifying food textures   Yes Able to maintain usual physical actiivty     Nutrition Diagnosis     Nutrition Diagnosis Related to (Etiology) As Evidenced By (Signs/Symptoms)   Swallowing difficulty Physiological causes Radiation with sores                 Nutritional Intervention and Prescription        Nutrition Intervention Enteral Nutrition  Volume and Medical food supplement: Commercial beverage   Goals/Expected Outcomes Pt to replace Isosource 1.5 per PEG with Ensure Plus PO in efforts to ween off PEG.   Progress Initial     Nutrition Intervention Schedule of food/fluids   Goals/Expected Outcomes Pt to eat 6 small meals throughout the day for adequate calories and protein.   Progress Progressing towards goal     Nutrition Intervention Other : exercise   Goals/Expected Outcomes Pt to increase activity to help with fatigue.  Pt to practice exercises given to her through PT.    Progress Initial     Pt needs education? yes (see intervention)    Coordination of Nutrition Care: Comments:   Collaboration with other providers MD         Monitoring and Evaluation     Monitor: diet education needs    Next Visit: No follow-ups on file.    Materials Provided:  1. RD contact information 2. Soft, moist high protein foods               Total time: 30 minutes    Nutrition Score:      ©2010 [employer] Oncology Toolkit

## 2019-05-07 NOTE — PLAN OF CARE
Problem: Adult Inpatient Plan of Care  Goal: Plan of Care Review  Outcome: Ongoing (interventions implemented as appropriate)  1303-Patient tolerated treatment well. Discharged without complaints or S/S of adverse event. AVS given.  Instructed to call provider for any questions or concerns.

## 2019-05-07 NOTE — PLAN OF CARE
Problem: Adult Inpatient Plan of Care  Goal: Plan of Care Review  Outcome: Ongoing (interventions implemented as appropriate)  0856-Labs , hx, and medications reviewed, patient returns today for next IVIG infusion. Assessment completed. Discussed plan of care with patient. Patient in agreement. Chair reclined and warm blanket and snack offered.

## 2019-05-08 ENCOUNTER — TELEPHONE (OUTPATIENT)
Dept: RHEUMATOLOGY | Facility: CLINIC | Age: 59
End: 2019-05-08

## 2019-05-08 PROCEDURE — 77385 HC IMRT, SIMPLE: CPT | Performed by: RADIOLOGY

## 2019-05-08 PROCEDURE — 77387 PR GUIDANCE FOR RADIATION TREATMENT DELIVERY: ICD-10-PCS | Mod: 26,,, | Performed by: RADIOLOGY

## 2019-05-08 PROCEDURE — 77387 GUIDANCE FOR RADJ TX DLVR: CPT | Mod: 26,,, | Performed by: RADIOLOGY

## 2019-05-09 ENCOUNTER — DOCUMENTATION ONLY (OUTPATIENT)
Dept: RADIATION ONCOLOGY | Facility: CLINIC | Age: 59
End: 2019-05-09

## 2019-05-09 DIAGNOSIS — C50.412 MALIGNANT NEOPLASM OF UPPER-OUTER QUADRANT OF LEFT BREAST IN FEMALE, ESTROGEN RECEPTOR NEGATIVE: Primary | Chronic | ICD-10-CM

## 2019-05-09 DIAGNOSIS — Z17.1 MALIGNANT NEOPLASM OF UPPER-OUTER QUADRANT OF LEFT BREAST IN FEMALE, ESTROGEN RECEPTOR NEGATIVE: Primary | Chronic | ICD-10-CM

## 2019-05-09 RX ORDER — SULFAMETHOXAZOLE AND TRIMETHOPRIM 800; 160 MG/1; MG/1
1 TABLET ORAL 2 TIMES DAILY
Qty: 20 TABLET | Refills: 0 | Status: SHIPPED | OUTPATIENT
Start: 2019-05-09 | End: 2019-05-15

## 2019-05-09 NOTE — PLAN OF CARE
Problem: Adult Inpatient Plan of Care  Goal: Plan of Care Review  Outcome: Ongoing (interventions implemented as appropriate)  Day 27 of outpatient XRT to the left breast. Still has a productive cough. Temp elevated to 101.4. Hyperpigmentation of the skin. Using Miaderm.

## 2019-05-10 ENCOUNTER — DOCUMENTATION ONLY (OUTPATIENT)
Dept: RADIATION ONCOLOGY | Facility: CLINIC | Age: 59
End: 2019-05-10

## 2019-05-10 PROCEDURE — 77336 RADIATION PHYSICS CONSULT: CPT | Performed by: RADIOLOGY

## 2019-05-10 PROCEDURE — 77387 GUIDANCE FOR RADJ TX DLVR: CPT | Mod: 26,,, | Performed by: RADIOLOGY

## 2019-05-10 PROCEDURE — 77387 PR GUIDANCE FOR RADIATION TREATMENT DELIVERY: ICD-10-PCS | Mod: 26,,, | Performed by: RADIOLOGY

## 2019-05-10 PROCEDURE — 77385 HC IMRT, SIMPLE: CPT | Performed by: RADIOLOGY

## 2019-05-13 NOTE — PLAN OF CARE
Problem: Adult Inpatient Plan of Care  Goal: Plan of Care Review  Outcome: Outcome(s) achieved Date Met: 05/13/19  Radiation to the rt breast completed on 5/10/19  F/u appt made

## 2019-05-15 ENCOUNTER — PATIENT MESSAGE (OUTPATIENT)
Dept: RHEUMATOLOGY | Facility: CLINIC | Age: 59
End: 2019-05-15

## 2019-05-15 ENCOUNTER — TELEPHONE (OUTPATIENT)
Dept: RHEUMATOLOGY | Facility: CLINIC | Age: 59
End: 2019-05-15

## 2019-05-15 DIAGNOSIS — C50.412 MALIGNANT NEOPLASM OF UPPER-OUTER QUADRANT OF LEFT BREAST IN FEMALE, ESTROGEN RECEPTOR NEGATIVE: ICD-10-CM

## 2019-05-15 DIAGNOSIS — M33.13 DERMATOMYOSITIS: ICD-10-CM

## 2019-05-15 DIAGNOSIS — D84.821 IMMUNOSUPPRESSION DUE TO DRUG THERAPY: ICD-10-CM

## 2019-05-15 DIAGNOSIS — Z17.1 MALIGNANT NEOPLASM OF UPPER-OUTER QUADRANT OF LEFT BREAST IN FEMALE, ESTROGEN RECEPTOR NEGATIVE: ICD-10-CM

## 2019-05-15 DIAGNOSIS — Z79.899 IMMUNOSUPPRESSION DUE TO DRUG THERAPY: ICD-10-CM

## 2019-05-15 RX ORDER — ATOVAQUONE 750 MG/5ML
1500 SUSPENSION ORAL DAILY
Qty: 300 ML | Refills: 2 | Status: SHIPPED | OUTPATIENT
Start: 2019-05-15 | End: 2019-05-31

## 2019-05-15 RX ORDER — ATOVAQUONE AND PROGUANIL HYDROCHLORIDE 250; 100 MG/1; MG/1
TABLET, FILM COATED ORAL
OUTPATIENT
Start: 2019-05-15

## 2019-05-15 NOTE — TELEPHONE ENCOUNTER
Leucovorin refilled.  Atovaquone refilled.     Patient's MAR showed Malarone - instead of Mepron (atovaqunone/proguanil).  Called pharmacy for clarification.  Patient did not get Mepron fill - only malarone (last filled April 2019)    Patient did get bactrim DS fill on May 9 (prescribed by Dr. Lopez - rad/oncologist).  Uncertain of the cause - tried calling patient but no answer.  Will message on portal.  If patient is taking bactrim with folic acid/leucovorin, it will decrease efficacy of bactrim.

## 2019-05-16 ENCOUNTER — PATIENT MESSAGE (OUTPATIENT)
Dept: RADIATION ONCOLOGY | Facility: CLINIC | Age: 59
End: 2019-05-16

## 2019-05-17 DIAGNOSIS — Z17.1 MALIGNANT NEOPLASM OF UPPER-OUTER QUADRANT OF LEFT BREAST IN FEMALE, ESTROGEN RECEPTOR NEGATIVE: Primary | Chronic | ICD-10-CM

## 2019-05-17 DIAGNOSIS — C50.412 MALIGNANT NEOPLASM OF UPPER-OUTER QUADRANT OF LEFT BREAST IN FEMALE, ESTROGEN RECEPTOR NEGATIVE: Primary | Chronic | ICD-10-CM

## 2019-05-17 RX ORDER — SUCRALFATE 1 G/10ML
1 SUSPENSION ORAL 4 TIMES DAILY
Qty: 414 ML | Refills: 0 | Status: SHIPPED | OUTPATIENT
Start: 2019-05-17 | End: 2019-06-18

## 2019-05-20 ENCOUNTER — TELEPHONE (OUTPATIENT)
Dept: RADIATION ONCOLOGY | Facility: CLINIC | Age: 59
End: 2019-05-20

## 2019-05-20 DIAGNOSIS — C50.412 MALIGNANT NEOPLASM OF UPPER-OUTER QUADRANT OF LEFT BREAST IN FEMALE, ESTROGEN RECEPTOR NEGATIVE: ICD-10-CM

## 2019-05-20 DIAGNOSIS — Z17.1 MALIGNANT NEOPLASM OF UPPER-OUTER QUADRANT OF LEFT BREAST IN FEMALE, ESTROGEN RECEPTOR NEGATIVE: ICD-10-CM

## 2019-05-20 DIAGNOSIS — M33.13 DERMATOMYOSITIS: ICD-10-CM

## 2019-05-20 DIAGNOSIS — D84.821 IMMUNOSUPPRESSION DUE TO DRUG THERAPY: ICD-10-CM

## 2019-05-20 DIAGNOSIS — Z79.899 IMMUNOSUPPRESSION DUE TO DRUG THERAPY: ICD-10-CM

## 2019-05-21 ENCOUNTER — OFFICE VISIT (OUTPATIENT)
Dept: SURGERY | Facility: CLINIC | Age: 59
End: 2019-05-21
Payer: COMMERCIAL

## 2019-05-21 ENCOUNTER — PATIENT MESSAGE (OUTPATIENT)
Dept: HEMATOLOGY/ONCOLOGY | Facility: CLINIC | Age: 59
End: 2019-05-21

## 2019-05-21 ENCOUNTER — TELEPHONE (OUTPATIENT)
Dept: RADIATION ONCOLOGY | Facility: CLINIC | Age: 59
End: 2019-05-21

## 2019-05-21 VITALS
BODY MASS INDEX: 28.17 KG/M2 | TEMPERATURE: 98 F | WEIGHT: 169.06 LBS | SYSTOLIC BLOOD PRESSURE: 122 MMHG | HEIGHT: 65 IN | DIASTOLIC BLOOD PRESSURE: 71 MMHG | HEART RATE: 113 BPM

## 2019-05-21 DIAGNOSIS — Z17.1 MALIGNANT NEOPLASM OF UPPER-OUTER QUADRANT OF LEFT BREAST IN FEMALE, ESTROGEN RECEPTOR NEGATIVE: Primary | Chronic | ICD-10-CM

## 2019-05-21 DIAGNOSIS — C50.412 MALIGNANT NEOPLASM OF UPPER-OUTER QUADRANT OF LEFT BREAST IN FEMALE, ESTROGEN RECEPTOR NEGATIVE: Primary | Chronic | ICD-10-CM

## 2019-05-21 PROCEDURE — 99213 OFFICE O/P EST LOW 20 MIN: CPT | Mod: S$GLB,,, | Performed by: SURGERY

## 2019-05-21 PROCEDURE — 3008F PR BODY MASS INDEX (BMI) DOCUMENTED: ICD-10-PCS | Mod: CPTII,S$GLB,, | Performed by: SURGERY

## 2019-05-21 PROCEDURE — 99999 PR PBB SHADOW E&M-EST. PATIENT-LVL III: CPT | Mod: PBBFAC,,, | Performed by: SURGERY

## 2019-05-21 PROCEDURE — 99213 PR OFFICE/OUTPT VISIT, EST, LEVL III, 20-29 MIN: ICD-10-PCS | Mod: S$GLB,,, | Performed by: SURGERY

## 2019-05-21 PROCEDURE — 3074F SYST BP LT 130 MM HG: CPT | Mod: CPTII,S$GLB,, | Performed by: SURGERY

## 2019-05-21 PROCEDURE — 3008F BODY MASS INDEX DOCD: CPT | Mod: CPTII,S$GLB,, | Performed by: SURGERY

## 2019-05-21 PROCEDURE — 3074F PR MOST RECENT SYSTOLIC BLOOD PRESSURE < 130 MM HG: ICD-10-PCS | Mod: CPTII,S$GLB,, | Performed by: SURGERY

## 2019-05-21 PROCEDURE — 3078F DIAST BP <80 MM HG: CPT | Mod: CPTII,S$GLB,, | Performed by: SURGERY

## 2019-05-21 PROCEDURE — 99999 PR PBB SHADOW E&M-EST. PATIENT-LVL III: ICD-10-PCS | Mod: PBBFAC,,, | Performed by: SURGERY

## 2019-05-21 PROCEDURE — 3078F PR MOST RECENT DIASTOLIC BLOOD PRESSURE < 80 MM HG: ICD-10-PCS | Mod: CPTII,S$GLB,, | Performed by: SURGERY

## 2019-05-28 ENCOUNTER — PATIENT MESSAGE (OUTPATIENT)
Dept: HEMATOLOGY/ONCOLOGY | Facility: CLINIC | Age: 59
End: 2019-05-28

## 2019-05-28 NOTE — PROGRESS NOTES
Breast Surgery  [facility]  Department of Surgery        REFERRING PROVIDER: Alfredo English  MEDICAL ONCOLOGIST: Dr. Alex Reyna  RADIATION ONCOLOGIST:  Dr. Jose Lopez    Chief Complaint: recurrent breast cancer        Subjective:      Patient ID: Ermelinda Verde is a 56 y.o. female who recently was diagnosed with metastatic breast cancer to supraclavicular and cervical chain nodes and has now completed chemotherapy with nab-paclitaxel and Atezolizumab with Dr. Reyna. She has also completed radiation with Dr. Lopez.  SHe had a difficult time with treatment developing dermatomyositis requiring prolonged hospitalization and PEG tube, which she still has.  She otherwise has no complaints. No CP, SOB, Back or bony pain, HA or vision changes.    She is s/p left skin sparing total mastectomy with SCOTT flap reconstruction (Dr. Sewell) on 6/26/18.      Due to the fact that she had residual malignancy at time of surgery, she received additional chemotherapy with single agent Adriamycin for 4 cycles given in a dose dense fashion.         Oncologic History:  She developed a palpable abnormality in her left breast in January 2017 which she noted on self-examination. A diagnostic mammogram on January 19 showed a greater than 1 cm nodule in the upper outer portion of left breast.  By ultrasound this was lobulated and hypoechoic measuring 1.75 x 1.51 x 1.96 cm. On January 24, 2017 a core needle biopsy was performed which showed infiltrating ductal carcinoma, high grade.  The tumor was ER negative, IA negative, and HER-2 negative. A follow-up ultrasound on February 6 showed 2.5 x 2.2 x 1.5 cm left breast mass.  There was no abnormality noted in the left axilla. She underwent sentinel lymph node biopsy on February 22.  That showed 4 negative lymph nodes.     She had 4 cycles of  Wilbur-adjuvantTaxotere and Cytoxan completed  on 5/9/17.     On June 26 she underwent left mastectomy.  That revealed 2 foci of invasive  high-grade carcinoma measuring 14 mm and 1.5 mm.  Margins were negative.     She completed 4 cycles of adjuvant Adriamycin on 2017.     She then developed a palpable supraclavicular LN in Oct 2018 which was biopsied and showed triple negative metastatic adenocarcinoma. PET scan (10/11/18) showed multiple enlarged left-sided hypermetabolic nodes are present involving the left lower cervical/supraclavicular chain, infraclavicular region, and retropectoral region.  Index left supraclavicular node (for axis measuring 2.0 cm) has SUV max 27.2 and index left retropectoral node (ill-defined with short axis measuring approximately 2.1 cm) has SUV max 27.2    BRCA 1/2 negative.    Interval:   She is improving, regaining strength but struggles with many side effects of her treatment, difficulty with voice, fatigue, XT changes.Getting back to eating soups.  Here today for surveillance.          Past Medical History:   Diagnosis Date    Cancer       breast    Hypertension              Past Surgical History:   Procedure Laterality Date    BREAST SURGERY   2017     left breast bx     SECTION        D&C        PORTACATH PLACEMENT        SENTINEL LYMPH NODE BIOPSY   2017     left    UTERINE FIBROID SURGERY                 Current Outpatient Prescriptions on File Prior to Visit   Medication Sig Dispense Refill    amlodipine-benazepril (LOTREL) 10-40 mg per capsule TK 1 C PO QD   3    CALCIUM CARBONATE (CALCIUM 500 ORAL) Take by mouth.        cyanocobalamin (VITAMIN B-12) 500 MCG tablet Take 500 mcg by mouth once daily.        DOCUSATE CALCIUM (STOOL SOFTENER ORAL) Take by mouth.        hydrochlorothiazide (HYDRODIURIL) 25 MG tablet TK 1 T PO ONCE A DAY   3    metoprolol succinate (TOPROL-XL) 50 MG 24 hr tablet TK 1/2 T PO QD   3    potassium chloride SA (K-DUR,KLOR-CON) 20 MEQ tablet TK 1 T PO  QD   3    ferrous gluconate (FERGON) 324 MG tablet TK 1 T PO QD   3      No current  facility-administered medications on file prior to visit.       Social History   Social History            Social History    Marital status:        Spouse name: N/A    Number of children: N/A    Years of education: N/A          Occupational History    Not on file.             Social History Main Topics    Smoking status: Never Smoker    Smokeless tobacco: Not on file    Alcohol use Yes         Comment: wine    Drug use: No    Sexual activity: Not on file           Other Topics Concern    Not on file          Social History Narrative    No narrative on file                Family History   Problem Relation Age of Onset    Heart disease Father      Breast cancer Sister         at age 52 or 53        Objective:     Vitals:    05/21/19 1320   BP: 122/71   Pulse: (!) 113   Temp: 98.3 °F (36.8 °C)         Physical Exam   Constitutional: She is oriented to person, place, and time. She appears well-developed and well-nourished.   Neck: Normal range of motion. Neck supple. There is a tiny remnant of sub-cm node at the base of the neck in the supraclavicular space, could be the coil marker just below skin surface.  I do not feel additional LAD, no axillary LAD.  Cardiovascular: Normal rate, regular rhythm and normal heart sounds.    Pulmonary/Chest: Effort normal and breath sounds normal.   Well healed incisional scars of the left and right breast around the nipple and inferior to the nipple. No palapble mass or axillary adenopathy.    Abdominal: Soft. Bowel sounds are normal.   Musculoskeletal: Normal range of motion.   Neurological: She is alert and oriented to person, place, and time.      Supraclavicular LN Bx 10/19/18:  Positive for metastatic adenocarcinoma, ER/LA -, HER2 -    Imaging:  PET 10/11/18:  Multiple enlarged left-sided hypermetabolic nodes are present involving the left lower cervical/supraclavicular chain, infraclavicular region, and retropectoral region.  Index left supraclavicular node  (for axis measuring 2.0 cm) has SUV max 27.2 and index left retropectoral node (ill-defined with short axis measuring approximately 2.1 cm) has SUV max 27.2.    Right Mammogram 11/13/18:  Computer-aided detection was utilized in the interpretation of this examination. This procedure was performed using tomosynthesis.       The breast is heterogeneously dense, which may obscure small masses. There is no evidence of suspicious masses, microcalcifications or architectural distortion.  Status post right breast reduction. Status post contralateral mastectomy.  Only the remaining breast has been imaged.      Impression:  No mammographic evidence of malignancy.     BI-RADS Category 1: Negative     Recommendation:  Routine screening mammogram in 1 year is recommended.     Assessment:       Ms. Wadsworth is a 58 yo F s/p left total mastectomy skin sparing with DEIP flap reconstruction on 6/26/18 for [id number], Stage IA triple negative IDC presenting with biopsy proven metastatic disease in her left supraclavicular LN, and more PET +  Nodes along left cervical and retropectoral LN chain, now completed chemotherapy and XRT    Plan:      - Follow up with [facility]  in 6 months.  - Contralateral mammogram due November, 2019  - Continue follow up with medical oncology  - Call with any questions or concerns  - Continue PT, strengthening

## 2019-05-29 NOTE — PROGRESS NOTES
Subjective:       Patient ID: Ermelinda Verde is a 58 y.o. female.    Chief Complaint: No chief complaint on file.    HPI  Mrs Verde is  a very pleasant 58-year-old -American female who returns for a diagnosis of recurrent triple negative left breast cancer.    She is S/P 3 cycles of nab-paclitaxel and Atezolizumab (last treatment in January 2019).  Her treatment was complicated by the development of dermatomyositis which resulted in the lengthy hospitalization from January 22nd to February 13th.   She has been treated with steroids, IVIG, and low-dose methotrexate.    She continues on IV IG therapy and is due next week.  She is on 20 mg of prednisone a day.  PET-CT in March showed complete response.      She completed radiation 3 weeks ago and had significant desquamation and also some esophagitis.  She had to go back to using her feeding tube but for the last 4 days been relying on oral intake alone.  She does have some ongoing fatigue with energy level approximately 7/10.  She started to do some of her normal activities at home.  She denies any shortness of breath.  She does have some residual chest tightness.  She has occasional jitteriness.  She has ongoing paresthesias in her toes greater than her fingers.        Onc History:  She developed a palpable abnormality in her left breast in January 2017 which she noted on self-examination.  A diagnostic mammogram on January 19 showed a greater than 1 cm nodule in the upper outer portion of left breast.  By ultrasound this was lobulated and hypoechoic measuring 1.75 x 1.51 x 1.96 cm.     On January 24, 2017 a core needle biopsy was performed which showed infiltrating ductal carcinoma, high grade.  The tumor was ER negative, AL negative, and HER-2 negative.  A follow-up ultrasound on December 6 showed 2.5 x 2.2 x 1.5 cm left breast mass.  There was no abnormality noted in the left axilla.     She underwent sentinel lymph node biopsy on February 22.  That  showed 4 negative lymph nodes.     She had 4 cycles of  Wilbur-adjuvantTaxotere and Cytoxan completed  on 5/9/17.     On June 26 she underwent left mastectomy.  That revealed 2 foci of invasive high-grade carcinoma measuring 14 mm and 1.5 mm.  Margins were negative.    She completed 4 cycles of adjuvant Adriamycin on September 12, 2017.      In October 2018, she developed some left supraclavicular lymphadenopathy which turned out to be recurrence.     A fine-needle aspirate of the lymph node was performed on October 19th.  That showed metastatic carcinoma consistent with breast primary which was ER negative, [place] negative and HER 2-negative.    She then received 3 cycles of Nab paclitaxel and atezolizumab.  She last received treatment on January 3, 2019.  Review of Systems   Constitutional: Positive for activity change ( improving) and fatigue. Negative for appetite change, fever and unexpected weight change.   HENT: Positive for trouble swallowing (Improving).    Eyes: Negative for visual disturbance.   Respiratory: Positive for chest tightness. Negative for cough and shortness of breath.    Cardiovascular: Negative for chest pain.   Gastrointestinal: Positive for constipation ( controlled with Senokot). Negative for abdominal pain and diarrhea.   Genitourinary: Negative for frequency.   Musculoskeletal: Negative for back pain.        Paronychia right 3rd finger   Skin: Negative for rash.   Neurological: Negative for headaches.   Hematological: Negative for adenopathy.   Psychiatric/Behavioral: The patient is not nervous/anxious.        Objective:      Physical Exam   Constitutional: She is oriented to person, place, and time. She appears well-developed and well-nourished. No distress.   HENT:   Mouth/Throat: No oropharyngeal exudate.   Eyes: No scleral icterus.   Cardiovascular: Normal rate and regular rhythm.   Pulmonary/Chest: Effort normal and breath sounds normal. Right breast exhibits no mass, no nipple discharge  and no skin change. Left breast exhibits skin change. Left breast exhibits no mass and no nipple discharge.       Abdominal: Soft. She exhibits no mass. There is no tenderness.   Lymphadenopathy:     She has no cervical adenopathy.   Neurological: She is alert and oriented to person, place, and time.   Psychiatric: She has a normal mood and affect. Her behavior is normal. Thought content normal.   Vitals reviewed.      Assessment:       1. Malignant neoplasm of upper-outer quadrant of left breast in female, estrogen receptor negative    2. Regional lymph node metastasis present    3. Polymyositis associated with autoimmune disease        Plan:       She will continue to follow up with Rheumatology for her myositis.  Return in 1 month with repeat PET scan.        Distress Screening Results: Psychosocial Distress screening score of Distress Score: 2 noted and reviewed. No intervention indicated.

## 2019-05-30 ENCOUNTER — OFFICE VISIT (OUTPATIENT)
Dept: HEMATOLOGY/ONCOLOGY | Facility: CLINIC | Age: 59
End: 2019-05-30
Payer: COMMERCIAL

## 2019-05-30 ENCOUNTER — PATIENT MESSAGE (OUTPATIENT)
Dept: HEMATOLOGY/ONCOLOGY | Facility: CLINIC | Age: 59
End: 2019-05-30

## 2019-05-30 ENCOUNTER — TELEPHONE (OUTPATIENT)
Dept: HEMATOLOGY/ONCOLOGY | Facility: CLINIC | Age: 59
End: 2019-05-30

## 2019-05-30 ENCOUNTER — LAB VISIT (OUTPATIENT)
Dept: LAB | Facility: HOSPITAL | Age: 59
End: 2019-05-30
Attending: INTERNAL MEDICINE
Payer: COMMERCIAL

## 2019-05-30 VITALS
HEART RATE: 102 BPM | BODY MASS INDEX: 29.02 KG/M2 | DIASTOLIC BLOOD PRESSURE: 80 MMHG | RESPIRATION RATE: 18 BRPM | OXYGEN SATURATION: 99 % | SYSTOLIC BLOOD PRESSURE: 141 MMHG | TEMPERATURE: 98 F | HEIGHT: 65 IN | WEIGHT: 174.19 LBS

## 2019-05-30 DIAGNOSIS — Z79.899 IMMUNOSUPPRESSION DUE TO DRUG THERAPY: ICD-10-CM

## 2019-05-30 DIAGNOSIS — Z17.1 MALIGNANT NEOPLASM OF UPPER-OUTER QUADRANT OF LEFT BREAST IN FEMALE, ESTROGEN RECEPTOR NEGATIVE: Primary | Chronic | ICD-10-CM

## 2019-05-30 DIAGNOSIS — Z17.1 MALIGNANT NEOPLASM OF UPPER-OUTER QUADRANT OF LEFT BREAST IN FEMALE, ESTROGEN RECEPTOR NEGATIVE: ICD-10-CM

## 2019-05-30 DIAGNOSIS — C50.412 MALIGNANT NEOPLASM OF UPPER-OUTER QUADRANT OF LEFT BREAST IN FEMALE, ESTROGEN RECEPTOR NEGATIVE: Primary | Chronic | ICD-10-CM

## 2019-05-30 DIAGNOSIS — C77.9 REGIONAL LYMPH NODE METASTASIS PRESENT: ICD-10-CM

## 2019-05-30 DIAGNOSIS — C50.412 MALIGNANT NEOPLASM OF UPPER-OUTER QUADRANT OF LEFT BREAST IN FEMALE, ESTROGEN RECEPTOR NEGATIVE: ICD-10-CM

## 2019-05-30 DIAGNOSIS — D84.821 IMMUNOSUPPRESSION DUE TO DRUG THERAPY: ICD-10-CM

## 2019-05-30 DIAGNOSIS — Z51.11 ENCOUNTER FOR ANTINEOPLASTIC CHEMOTHERAPY: ICD-10-CM

## 2019-05-30 DIAGNOSIS — M33.13 DERMATOMYOSITIS: ICD-10-CM

## 2019-05-30 DIAGNOSIS — M35.9 POLYMYOSITIS ASSOCIATED WITH AUTOIMMUNE DISEASE: ICD-10-CM

## 2019-05-30 DIAGNOSIS — M33.20 POLYMYOSITIS ASSOCIATED WITH AUTOIMMUNE DISEASE: ICD-10-CM

## 2019-05-30 LAB
ALBUMIN SERPL BCP-MCNC: 2.9 G/DL (ref 3.5–5.2)
ALBUMIN SERPL BCP-MCNC: 2.9 G/DL (ref 3.5–5.2)
ALP SERPL-CCNC: 61 U/L (ref 55–135)
ALP SERPL-CCNC: 61 U/L (ref 55–135)
ALT SERPL W/O P-5'-P-CCNC: 24 U/L (ref 10–44)
ALT SERPL W/O P-5'-P-CCNC: 24 U/L (ref 10–44)
ANION GAP SERPL CALC-SCNC: 8 MMOL/L (ref 8–16)
ANION GAP SERPL CALC-SCNC: 8 MMOL/L (ref 8–16)
ANISOCYTOSIS BLD QL SMEAR: SLIGHT
ANISOCYTOSIS BLD QL SMEAR: SLIGHT
AST SERPL-CCNC: 44 U/L (ref 10–40)
AST SERPL-CCNC: 44 U/L (ref 10–40)
BASOPHILS # BLD AUTO: 0.02 K/UL (ref 0–0.2)
BASOPHILS # BLD AUTO: 0.02 K/UL (ref 0–0.2)
BASOPHILS NFR BLD: 0.9 % (ref 0–1.9)
BASOPHILS NFR BLD: 0.9 % (ref 0–1.9)
BILIRUB SERPL-MCNC: 0.2 MG/DL (ref 0.1–1)
BILIRUB SERPL-MCNC: 0.2 MG/DL (ref 0.1–1)
BUN SERPL-MCNC: 10 MG/DL (ref 6–20)
BUN SERPL-MCNC: 10 MG/DL (ref 6–20)
CALCIUM SERPL-MCNC: 9.4 MG/DL (ref 8.7–10.5)
CALCIUM SERPL-MCNC: 9.4 MG/DL (ref 8.7–10.5)
CHLORIDE SERPL-SCNC: 106 MMOL/L (ref 95–110)
CHLORIDE SERPL-SCNC: 106 MMOL/L (ref 95–110)
CK SERPL-CCNC: 41 U/L (ref 20–180)
CO2 SERPL-SCNC: 27 MMOL/L (ref 23–29)
CO2 SERPL-SCNC: 27 MMOL/L (ref 23–29)
CREAT SERPL-MCNC: 0.7 MG/DL (ref 0.5–1.4)
CREAT SERPL-MCNC: 0.7 MG/DL (ref 0.5–1.4)
DACRYOCYTES BLD QL SMEAR: ABNORMAL
DACRYOCYTES BLD QL SMEAR: ABNORMAL
DIFFERENTIAL METHOD: ABNORMAL
DIFFERENTIAL METHOD: ABNORMAL
EOSINOPHIL # BLD AUTO: 0 K/UL (ref 0–0.5)
EOSINOPHIL # BLD AUTO: 0 K/UL (ref 0–0.5)
EOSINOPHIL NFR BLD: 0 % (ref 0–8)
EOSINOPHIL NFR BLD: 0 % (ref 0–8)
ERYTHROCYTE [DISTWIDTH] IN BLOOD BY AUTOMATED COUNT: 17.1 % (ref 11.5–14.5)
ERYTHROCYTE [DISTWIDTH] IN BLOOD BY AUTOMATED COUNT: 17.1 % (ref 11.5–14.5)
EST. GFR  (AFRICAN AMERICAN): >60 ML/MIN/1.73 M^2
EST. GFR  (AFRICAN AMERICAN): >60 ML/MIN/1.73 M^2
EST. GFR  (NON AFRICAN AMERICAN): >60 ML/MIN/1.73 M^2
EST. GFR  (NON AFRICAN AMERICAN): >60 ML/MIN/1.73 M^2
GLUCOSE SERPL-MCNC: 93 MG/DL (ref 70–110)
GLUCOSE SERPL-MCNC: 93 MG/DL (ref 70–110)
HCT VFR BLD AUTO: 35.4 % (ref 37–48.5)
HCT VFR BLD AUTO: 35.4 % (ref 37–48.5)
HGB BLD-MCNC: 10.9 G/DL (ref 12–16)
HGB BLD-MCNC: 10.9 G/DL (ref 12–16)
HYPOCHROMIA BLD QL SMEAR: ABNORMAL
HYPOCHROMIA BLD QL SMEAR: ABNORMAL
IMM GRANULOCYTES # BLD AUTO: 0 K/UL (ref 0–0.04)
IMM GRANULOCYTES # BLD AUTO: 0 K/UL (ref 0–0.04)
IMM GRANULOCYTES NFR BLD AUTO: 0 % (ref 0–0.5)
IMM GRANULOCYTES NFR BLD AUTO: 0 % (ref 0–0.5)
LYMPHOCYTES # BLD AUTO: 1.2 K/UL (ref 1–4.8)
LYMPHOCYTES # BLD AUTO: 1.2 K/UL (ref 1–4.8)
LYMPHOCYTES NFR BLD: 53.2 % (ref 18–48)
LYMPHOCYTES NFR BLD: 53.2 % (ref 18–48)
MCH RBC QN AUTO: 26 PG (ref 27–31)
MCH RBC QN AUTO: 26 PG (ref 27–31)
MCHC RBC AUTO-ENTMCNC: 30.8 G/DL (ref 32–36)
MCHC RBC AUTO-ENTMCNC: 30.8 G/DL (ref 32–36)
MCV RBC AUTO: 84 FL (ref 82–98)
MCV RBC AUTO: 84 FL (ref 82–98)
MONOCYTES # BLD AUTO: 0.5 K/UL (ref 0.3–1)
MONOCYTES # BLD AUTO: 0.5 K/UL (ref 0.3–1)
MONOCYTES NFR BLD: 19.3 % (ref 4–15)
MONOCYTES NFR BLD: 19.3 % (ref 4–15)
NEUTROPHILS # BLD AUTO: 0.6 K/UL (ref 1.8–7.7)
NEUTROPHILS # BLD AUTO: 0.6 K/UL (ref 1.8–7.7)
NEUTROPHILS NFR BLD: 26.6 % (ref 38–73)
NEUTROPHILS NFR BLD: 26.6 % (ref 38–73)
NRBC BLD-RTO: 0 /100 WBC
NRBC BLD-RTO: 0 /100 WBC
OVALOCYTES BLD QL SMEAR: ABNORMAL
OVALOCYTES BLD QL SMEAR: ABNORMAL
PLATELET # BLD AUTO: 120 K/UL (ref 150–350)
PLATELET # BLD AUTO: 120 K/UL (ref 150–350)
PLATELET BLD QL SMEAR: ABNORMAL
PLATELET BLD QL SMEAR: ABNORMAL
PMV BLD AUTO: 9.1 FL (ref 9.2–12.9)
PMV BLD AUTO: 9.1 FL (ref 9.2–12.9)
POIKILOCYTOSIS BLD QL SMEAR: SLIGHT
POIKILOCYTOSIS BLD QL SMEAR: SLIGHT
POLYCHROMASIA BLD QL SMEAR: ABNORMAL
POLYCHROMASIA BLD QL SMEAR: ABNORMAL
POTASSIUM SERPL-SCNC: 3.6 MMOL/L (ref 3.5–5.1)
POTASSIUM SERPL-SCNC: 3.6 MMOL/L (ref 3.5–5.1)
PROT SERPL-MCNC: 7.9 G/DL (ref 6–8.4)
PROT SERPL-MCNC: 7.9 G/DL (ref 6–8.4)
RBC # BLD AUTO: 4.2 M/UL (ref 4–5.4)
RBC # BLD AUTO: 4.2 M/UL (ref 4–5.4)
SMUDGE CELLS BLD QL SMEAR: PRESENT
SMUDGE CELLS BLD QL SMEAR: PRESENT
SODIUM SERPL-SCNC: 141 MMOL/L (ref 136–145)
SODIUM SERPL-SCNC: 141 MMOL/L (ref 136–145)
WBC # BLD AUTO: 2.33 K/UL (ref 3.9–12.7)
WBC # BLD AUTO: 2.33 K/UL (ref 3.9–12.7)

## 2019-05-30 PROCEDURE — 99999 PR PBB SHADOW E&M-EST. PATIENT-LVL III: CPT | Mod: PBBFAC,,, | Performed by: INTERNAL MEDICINE

## 2019-05-30 PROCEDURE — 85025 COMPLETE CBC W/AUTO DIFF WBC: CPT

## 2019-05-30 PROCEDURE — 3008F BODY MASS INDEX DOCD: CPT | Mod: CPTII,S$GLB,, | Performed by: INTERNAL MEDICINE

## 2019-05-30 PROCEDURE — 3077F SYST BP >= 140 MM HG: CPT | Mod: CPTII,S$GLB,, | Performed by: INTERNAL MEDICINE

## 2019-05-30 PROCEDURE — 3077F PR MOST RECENT SYSTOLIC BLOOD PRESSURE >= 140 MM HG: ICD-10-PCS | Mod: CPTII,S$GLB,, | Performed by: INTERNAL MEDICINE

## 2019-05-30 PROCEDURE — 99999 PR PBB SHADOW E&M-EST. PATIENT-LVL III: ICD-10-PCS | Mod: PBBFAC,,, | Performed by: INTERNAL MEDICINE

## 2019-05-30 PROCEDURE — 80053 COMPREHEN METABOLIC PANEL: CPT

## 2019-05-30 PROCEDURE — 82085 ASSAY OF ALDOLASE: CPT

## 2019-05-30 PROCEDURE — 3008F PR BODY MASS INDEX (BMI) DOCUMENTED: ICD-10-PCS | Mod: CPTII,S$GLB,, | Performed by: INTERNAL MEDICINE

## 2019-05-30 PROCEDURE — 36415 COLL VENOUS BLD VENIPUNCTURE: CPT

## 2019-05-30 PROCEDURE — 99214 OFFICE O/P EST MOD 30 MIN: CPT | Mod: S$GLB,,, | Performed by: INTERNAL MEDICINE

## 2019-05-30 PROCEDURE — 82550 ASSAY OF CK (CPK): CPT

## 2019-05-30 PROCEDURE — 99214 PR OFFICE/OUTPT VISIT, EST, LEVL IV, 30-39 MIN: ICD-10-PCS | Mod: S$GLB,,, | Performed by: INTERNAL MEDICINE

## 2019-05-30 PROCEDURE — 3079F PR MOST RECENT DIASTOLIC BLOOD PRESSURE 80-89 MM HG: ICD-10-PCS | Mod: CPTII,S$GLB,, | Performed by: INTERNAL MEDICINE

## 2019-05-30 PROCEDURE — 3079F DIAST BP 80-89 MM HG: CPT | Mod: CPTII,S$GLB,, | Performed by: INTERNAL MEDICINE

## 2019-05-30 RX ORDER — PREDNISONE 10 MG/1
TABLET ORAL
COMMUNITY
Start: 2019-05-27 | End: 2019-06-03 | Stop reason: SDUPTHER

## 2019-05-30 NOTE — TELEPHONE ENCOUNTER
Left message to see if patient wanted to weekly cbc at [facility]. Number was provided for her to confirm.          ----- Message from Flavia Barillas RN sent at 5/30/2019  1:40 PM CDT -----  [place],   Would you mind scheduling Ms Wadsworth for weekly CBCs?    ----- Message -----  From: Alex Reyna MD  Sent: 5/30/2019  11:44 AM  To: Flavia Barillas RN    Needs repeat CBC weekly due to low WBC

## 2019-05-30 NOTE — PROGRESS NOTES
RHEUMATOLOGY CLINIC FOLLOW UP VISIT    Chief complaints:- Myositis       HPI:-  Ermelinda Javed a 58 y.o.F with history of L sided infiltrating ductal carcinoma of the L breast (dx on Jan 2017), HTN, depression, gastritis, and recently diagnosed myositis (uncertain if it's autoimmune vs medication induced), recently discharge to rehab present to the clinic today for hospital follow up.    Patient was initially send from hem/onc clinic for evaluation of possible inflammatory myositis.  Patient was hospitalized from 1/22/19 till 2/13/19 for myositis.      L breast cancer s/p completion of 4 cycles of demi-adjuvant taxotere and cytoxan (completed on 5/9/17) and mastectomy (6/26/17) and then 4 cycles of adjuvant Adriamycin (completed 9/12/17). In 10/2017 - patient developed L supraclavicular lymphadenopathy which FNA confirmed as reoccurrence of previously treated breast cancer.      Patient started on Atelizumab/Abraxane on 11/7/18. Per chart review, patient noticed rashes on the dorsum of her hands on 11/11/18. Seen in urgent care and given topical steroid cream. Then received two more infusions on Atelizumab/Abraxane on 11/21/18 and 12/5/18. Started to notice puffiness around the eyes on 12/11/18. Received another Abraxane infusion on 12/12/18 but this time with hydrocortisone 50 mg which helped reduce the swelling around the eyes. Developed swelling of the face again on 12/15/18. Next infusion of Abraxane done on 12/19/18 with Solucortef and Atelizumab held. Abraxane given again on 1/3/19 with no IV steroid. Patient developed swelling of the face on 1/4/19 and went to urgent care and given short course of prednisone 20 mg BID. On 1/15/19, patient seen in hem/onc clinic with c/o of pressure and tightness around neck. CT scan of chest and neck did not reveal any vascular compression. Started on prednisone 60 mg with taper. On 1/22/18 patient with c/o proximal muscle weakness. CPK found to  be elevated around 4k. Patient admitted and given solumedrol 80 mg IV on 1/22/18. Last infusion of Atelizumab on 12/19/18. Last infusion of Abraxane on 1/3/19. Given Solumedrol 1g x 1 on 1/23/18. Seen by Dermatology on 1/24/18 and biopsy done of skin rash. Given distribution of rashes, there was concern for dermatomyositis. Patient started on Solumedrol 125 mg IV BID at this time.     During her hospitalization patient was started on high dose steroid Solumedrol 80mg IV x 1, 1g x 1, and then 125mg BID until tapered down to medrol 48mg.  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  Patient was started on IVIG (400mg/kg/daily x 5 day) 1/29/19-2/2.   Patient started on MTX 20mg SQ 2/5 with folic acid. MTX SQ was transitioned to PO because concern about rehab administration.  Patient failed swallow eval and PEG tube was placed.        Denies any family history of autoimmune diseases.     No smoking, EtOH, recreational drug usage.     Denies any photosensitivity, joint swelling, unintentional weigh loss, abdominal pain, night sweats, CP, SOB.  +oral thrush.     Completed rehab at [facility].  Completed IVIG (2/28 and 3/1).  Had mild HA after infusion.  Doing well.  A lot stronger - able to do household chores since discharge.  Not back at baseline yet ~80%.  Mild weakness with increased activities.  Able to ambulate without assistance (but is still a fall precaution).  Tolerating diet via PEG tube.  HHC and PT 2x/week.     MTX discontinued 2/18 with concern of toxicity (decrease blood counts and transaminatis).  Folic acid increased to 4mg daily.  Still on medrol 32mg daily with atorvaquone for PCP prophylaxis.  Bactrim d/c because of interaction with leucovorin.  Leucovorin 5mg daily started     Passed swallow eval - PEG tube still in place (until radiation is completed).  Tolerating diet without any problems.  Doing home PT and feels well.      Interval History  Radiation completed (28 days) ~ 3 weeks ago.   Doing well.  Continues to muscle strength improvement.  Doing home exercises.     PEG tube still in place.  Was having some throat irration post-radiation therapy.  Currently 1/2 PO intake and 1/2 PEG tube feeding.      Denies any new rashes, alopecia, arthralgia, abdominal pain, CP, or SOB, N/V, fever/chills.       Review of Systems   Constitutional: Negative for chills, diaphoresis, fever, malaise/fatigue and weight loss.   HENT: Negative for congestion, ear discharge, ear pain, hearing loss, nosebleeds, sinus pain and tinnitus.    Eyes: Negative for photophobia, pain, discharge and redness.   Respiratory: Negative for cough, hemoptysis, sputum production, shortness of breath, wheezing and stridor.    Cardiovascular: Negative for chest pain, palpitations, orthopnea, claudication, leg swelling and PND.   Gastrointestinal: Negative for abdominal pain, constipation, diarrhea, heartburn, nausea and vomiting.   Genitourinary: Negative for dysuria, frequency, hematuria and urgency.   Musculoskeletal: Positive for myalgias. Negative for back pain, joint pain and neck pain.   Skin: Negative for rash.   Neurological: Positive for weakness. Negative for dizziness, tingling, tremors and headaches.   Endo/Heme/Allergies: Does not bruise/bleed easily.   Psychiatric/Behavioral: Negative for depression, hallucinations and suicidal ideas. The patient is not nervous/anxious and does not have insomnia.        Past Medical History:   Diagnosis Date    Breast cancer 2017    left    Depression     Diverticulosis     Gastritis     Hypertension     Vitamin B12 deficiency 3/8/2018       Past Surgical History:   Procedure Laterality Date    BIOPSY-SENTINEL NODE Left 2017    [facility] OR    BREAST BIOPSY Left     BREAST RECONSTRUCTION Left 2017     SECTION      COLONOSCOPY  2011    repeat in 10 yrs.    D&C      EGD (ESOPHAGOGASTRODUODENOSCOPY) N/A 2019    Performed by  "Pernell Rosas MD at [facility] ENDO (2ND FLR)    INSERTION, PEG TUBE N/A 2/7/2019    Performed by Zurdo Gordon MD at [facility] END[facility] (2ND FLR)    FBRWEHMOV-VYBY-I-CATH Right 10/31/2018    Performed by Deo Palencia [facility] at [facility] OR 2ND FLR    YTZXHXVOU-CBXN-A-CATH Right 2/22/2017    [facility]    HAJOUHZCN-WJKU-T-CATH-neck or chest Fluoro needed Consent AM of surgery Right 10/30/2018    Performed by [facility] Palencia MD at [facility] OR 2ND FLR    MASTECTOMY Left 06/26/2017    MASTECTOMY Left 6/26/2017    [facility]    MYOMECTOMY      PORTACATH PLACEMENT      RECONSTRUCTION-BREAST/FLAP-SCOTT Left 6/26/2017    Performed by S[facility]jinder Sewell [facility] at [facility] OR    SENTINEL LYMPH NODE BIOPSY  02/2017    left    TOTAL REDUCTION MAMMOPLASTY Right 2017        Social History     Tobacco Use    Smoking status: Never Smoker    Smokeless tobacco: Never Used   Substance Use Topics    Alcohol use: Yes     Frequency: Never     Drinks per session: Patient refused     Binge frequency: Never     Comment: wine    Drug use: No       Family History   Problem Relation Age of Onset    Heart disease Father     Hypertension Father     Breast cancer Sister 52    Hypertension Mother     No Known Problems Brother     Thyroid disease Daughter         hyperthyroidism s/p thyroidectomy    No Known Problems Son     No Known Problems Brother     No Known Problems Brother     No Known Problems Sister     No Known Problems Daughter     Colon cancer Neg Hx     Ovarian cancer Neg Hx        Review of patient's allergies indicates:  No Known Allergies        Physical examination:-    Vitals:    05/31/19 1312   BP: (!) 147/88   Pulse: 101   Weight: 81.1 kg (178 lb 12.7 oz)   Height: 5' 5" (1.651 m)   PainSc:   1       Physical Exam   Constitutional: She is oriented to person, place, and time and well-developed, well-nourished, and in no distress.   HENT:   Head: Normocephalic and " atraumatic.   Eyes: Pupils are equal, round, and reactive to light. Conjunctivae are normal.   Neck: Normal range of motion. Neck supple. Tracheal deviation: tpmt.   Cardiovascular: Normal rate, regular rhythm and normal heart sounds.   Pulmonary/Chest: Effort normal and breath sounds normal.   Abdominal: Soft. Bowel sounds are normal. Rebound: pmt.   Musculoskeletal: She exhibits no edema or tenderness.   Right Side Rheumatological Exam      Muscle Strength (0-5 scale):  Neck Flexion:  5  Neck Extension: 5  Deltoid:  4.5  Biceps: 5/5   Triceps:  4.4  : 4.5/5   Iliopsoas: 4.5  Quadriceps:  5   Distal Lower Extremity: 5     Left Side Rheumatological Exam      Muscle Strength (0-5 scale):  Neck Flexion:  5  Neck Extension: 5  Deltoid:  4.5  Biceps: 5/5   Triceps:  5  :  5  Iliopsoas: 4.5  Quadriceps:  5   Distal Lower Extremity: 5     ROM intact   Neurological: She is alert and oriented to person, place, and time. Gait normal.   Skin: Skin is warm and dry.   Guttron's papules on hands - improved   Nape of the neck and bilateral lower extremities (shins) rash still present - improvement from hospitalization      Psychiatric: Mood, memory, affect and judgment normal.       Results for ROMEO PEREZ (MRN [id number]) as of 2/15/2019 15:30   Ref. Range 1/22/2019 22:24 1/25/2019 17:33 1/28/2019 19:31 1/29/2019 05:00 1/29/2019 12:27   DARCY HEP-2 Titer Unknown Positive 1:640 Sp...       Anti-SSA Antibody Latest Ref Range: 0.00 - 19.99 EU 0.49       Anti-SSA Interpretation Latest Ref Range: Negative  Negative       Anti-SSB Antibody Latest Ref Range: 0.00 - 19.99 EU 0.11       Anti-SSB Interpretation Latest Ref Range: Negative  Negative       ds DNA Ab Latest Ref Range: Negative 1:10  Negative 1:10       Anti Sm Antibody Latest Ref Range: 0.00 - 19.99 EU 0.58       Anti-Sm Interpretation Latest Ref Range: Negative  Negative       Anti Sm/RNP Antibody Latest Ref Range: 0.00 - 19.99 EU 0.62       Anti-Sm/RNP  Interpretation Latest Ref Range: Negative  Negative       DARCY Screen Latest Ref Range: Negative <1:160  Positive (A)       Complement (C-3) Latest Ref Range: 50 - 180 mg/dL  106      Complement (C-4) Latest Ref Range: 11 - 44 mg/dL  29      IgG - Serum Latest Ref Range: 650 - 1600 mg/dL   932     IgM Latest Ref Range: 50 - 300 mg/dL   66     IgA Latest Ref Range: 40 - 350 mg/dL   533 (H)     Anti-Meg-1 Antibody Latest Ref Range: <20 Units    <20    Anti-PM/Scl Ab Latest Ref Range: <20 Units    <20    Anti-SS-A 52 kD Ab, IgG Latest Ref Range: <20 Units    23 (H)    Anti-U1-RNP  Ab Latest Ref Range: <20 Units    <20    EJ Latest Ref Range: Negative     Negative    EJ Autoantibodies Latest Ref Range: Not Detected  Not Detected       Fibrillarin (U3 RNP) Latest Ref Range: Negative     Negative    HMGCR Antibody Latest Ref Range: 0 - 19 Units     <3   Meg-1 Autoantibodies Latest Ref Range: <1.0 Index <1.00       Ku Latest Ref Range: Negative     Negative    Ku Autoantibodies Latest Ref Range: Not Detected  Not Detected       MDA-5 (P140) (CADM-140) Latest Ref Range: <20 Units    <20    MI-2 Latest Ref Range: Negative     Negative    MI-2 Autoab Latest Ref Range: Not Detected  Not Detected       NXP-2 (P140) Latest Ref Range: <20 Units    <20    OJ Latest Ref Range: Negative     Negative    OJ Autoantibodies Latest Ref Range: Not Detected  Not Detected       PL-12 Latest Ref Range: Negative     Negative    PL-12 Autoantibodies Latest Ref Range: Not Detected  Not Detected       PL-7 Latest Ref Range: Negative     Negative    PL-7 Autoantibodies Latest Ref Range: Not Detected  Not Detected       SRP Latest Ref Range: Negative     Negative    SRP Autoantibodies Latest Ref Range: Not Detected  Not Detected       TIF1 GAMMA (P155/140) Latest Ref Range: <20 Units    25 (H)    U2 snRNP Latest Ref Range: Negative     Negative      Radiographs:-  Independent visualization of images done.   CXR (1/28) - clear lungs  PET scan - no  pulmonary/GI activity.       Medication List with Changes/Refills   Current Medications    (MAGIC MOUTHWASH) 1:1:1 BENADRYL 12.5MG/5ML LIQ, ALUMINUM & MAGNESIUM HYDROXIDE-SIMEHTICONE (MAALOX), LIDOCAINE VISCOUS 2%    Swish and spit 10 mLs every 4 (four) hours as needed. for mouth sores    ACETAMINOPHEN (TYLENOL) 325 MG TABLET    650 mg by Tube route every 4 (four) hours as needed for Pain.    ATOVAQUONE (MEPRON) 750 MG/5 ML SUSP    Take 10 mLs (1,500 mg total) by mouth once daily.    BENZONATATE (TESSALON PERLES) 100 MG CAPSULE    Take 1 capsule (100 mg total) by mouth every 6 (six) hours as needed for Cough.    FLUTICASONE (FLONASE) 50 MCG/ACTUATION NASAL SPRAY    SHAKE LIQUID AND USE 1 SPRAY(50 MCG) IN EACH NOSTRIL EVERY DAY    FOLIC ACID (FOLVITE) 1 MG TABLET    TAKE 4 TABLETS(4 MG) VIA GTUBE EVERY DAY    HYDROCODONE-HOMATROPINE 5-1.5 MG/5 ML (HYCODAN) 5-1.5 MG/5 ML SYRP    Take 5 mLs by mouth every 4 (four) hours as needed.    LEUCOVORIN 5 MG/ML SUSP    Take 1 mL (5 mg total) by mouth once daily.    OMEPRAZOLE (PRILOSEC) 40 MG CAPSULE    Take 1 capsule (40 mg total) by mouth 2 (two) times daily before meals.    PREDNISONE (DELTASONE) 10 MG TABLET        SENNA-DOCUSATE 8.6-50 MG (PERICOLACE) 8.6-50 MG PER TABLET    2 tablets by Tube route daily with lunch.    SUCRALFATE (CARAFATE) 100 MG/ML SUSPENSION    Take 10 mLs (1 g total) by mouth 4 (four) times daily.    TRIAMCINOLONE ACETONIDE 0.1% (KENALOG) 0.1 % CREAM    STEVIE EXT AA BID FOR 7 DAYS       Assessment/Plans:-   58 y.o.F with history of L sided infiltrating ductal carcinoma of the L breast (dx on Jan 2017), HTN, depression, gastritis, and recently diagnosed myositis (uncertain if it's autoimmune vs medication induced), recently discharge to rehab present to the clinic today for hospital follow up.       Patient started on Atelizumab/Abraxane on 11/7/18. Per chart review, patient noticed rashes on the dorsum of her hands on 11/11/18. Seen in urgent care and  given topical steroid cream. Then received two more infusions on Atelizumab/Abraxane on 11/21/18 and 12/5/18. Started to notice puffiness around the eyes on 12/11/18. Received another Abraxane infusion on 12/12/18 but this time with hydrocortisone 50 mg which helped reduce the swelling around the eyes. Developed swelling of the face again on 12/15/18. Next infusion of Abraxane done on 12/19/18 with Solucortef and Atelizumab held. Abraxane given again on 1/3/19 with no IV steroid. Patient developed swelling of the face on 1/4/19 and went to urgent care and given short course of prednisone 20 mg BID. On 1/15/19, patient seen in hem/onc clinic with c/o of pressure and tightness around neck. CT scan of chest and neck did not reveal any vascular compression. Started on prednisone 60 mg with taper. On 1/22/18 patient with c/o proximal muscle weakness. CPK found to be elevated around 4k. Patient admitted and given solumedrol 80 mg IV on 1/22/18. Last infusion of Atelizumab on 12/19/18. Last infusion of Abraxane on 1/3/19. Given Solumedrol 1g x 1 on 1/23/18. Seen by Dermatology on 1/24/18 and biopsy done of skin rash. Given distribution of rashes, there was concern for dermatomyositis. Patient started on Solumedrol 125 mg IV BID at that time.  Skin biopsy resulted consistent with dermatomyositis.     Labs: CPK and Aldolase normalized. +DARCY 1:640 speckled with negative profile.  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 quantTB: indeterminate, T-spot: negative     Spirometry: normal, normal diffusion capacity. FVC: 81%, DLCO: 114%     Patient exhibits signs of dermatomyositis (heliotropic rash and gottron's papules).   Dermatomyositis can be associated with malignancy.  MRI of LUE showed edema of the deltoid and biceps.  Exam with  proximal muscle weakness: b/l deltoids 4/5, b/l iliopsoas 4.4/5. 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   Given +TIF1 gamma positivity, most likely dermatomyositis is from underlying malignancy.      Failed 2nd swallow eval on 2/6/19. PEG placed 2/7/2019.     Current medications:  - prednisone 20mg   - IVIG 1/29/19-2/2, 2/28-3/1, 4/1-2, 5/6-7  - folic acid daily 4mg  - leucovorin 5mg daily  - atorvaquone daily     Esophageal biopsy - negative for viral infection.  Nonspecific chronic inflammation.    Patient is an intermediate metabolizer based on TPMT.  Cannot start imuran.  Labs still neutropenic and thrombocytopenic at this time - uncertain of the cause (radiation induce BM fibrosis vs medication induce?)  If labs does not improve/worsening of symptoms while tapering of steroid- consideration to start patient on Rituximab.  If labs improve, patient can be started on Cellcept.     Plan:  - CBC, CMP, CPK, Aldolase to be done prior to next visit  - continue prednisone tapering. Prednisone 15mg x 2 weeks and then 10mg until follow up  - GI referral for colonoscopy (cancer screening).  Last colonoscopy was 2011/2012 - per patient normal and next scheduled colonoscopy was in 10 years.   - continue folic acid 4mg daily  - Discontinue Leucovirin 5mg daily   - Discontinue atovaquone   - continue rehab - muscle strengthening   - will need to have all vaccination complete - Prevnar, shingles  - 1200 mg dietary calcium and Vitamin D3 1000 mg daily  - continue H2 blocker  - continue IVIG as previously scheduled   - message/call immediately if worsening muscle weakness      # RTC in 4 weeks             Answers for HPI/ROS submitted by the patient on 4/23/2019   trouble swallowing:  No  unexpected weight change: No  genital sore: No    Answers for HPI/ROS submitted by the patient on 5/25/2019   trouble swallowing: Yes  unexpected weight change: No  genital sore: No

## 2019-05-31 ENCOUNTER — OFFICE VISIT (OUTPATIENT)
Dept: RHEUMATOLOGY | Facility: CLINIC | Age: 59
End: 2019-05-31
Payer: COMMERCIAL

## 2019-05-31 VITALS
SYSTOLIC BLOOD PRESSURE: 147 MMHG | HEIGHT: 65 IN | DIASTOLIC BLOOD PRESSURE: 88 MMHG | HEART RATE: 101 BPM | WEIGHT: 178.81 LBS | BODY MASS INDEX: 29.79 KG/M2

## 2019-05-31 DIAGNOSIS — M33.20 POLYMYOSITIS ASSOCIATED WITH AUTOIMMUNE DISEASE: ICD-10-CM

## 2019-05-31 DIAGNOSIS — M35.9 POLYMYOSITIS ASSOCIATED WITH AUTOIMMUNE DISEASE: ICD-10-CM

## 2019-05-31 DIAGNOSIS — M33.13 DERMATOMYOSITIS: ICD-10-CM

## 2019-05-31 DIAGNOSIS — Z79.899 IMMUNOSUPPRESSION DUE TO DRUG THERAPY: Primary | ICD-10-CM

## 2019-05-31 DIAGNOSIS — D84.821 IMMUNOSUPPRESSION DUE TO DRUG THERAPY: Primary | ICD-10-CM

## 2019-05-31 DIAGNOSIS — Z79.899 ENCOUNTER FOR LONG-TERM CURRENT USE OF HIGH RISK MEDICATION: ICD-10-CM

## 2019-05-31 LAB — ALDOLASE SERPL-CCNC: 3.7 U/L (ref 1.2–7.6)

## 2019-05-31 PROCEDURE — 3008F BODY MASS INDEX DOCD: CPT | Mod: CPTII,S$GLB,, | Performed by: STUDENT IN AN ORGANIZED HEALTH CARE EDUCATION/TRAINING PROGRAM

## 2019-05-31 PROCEDURE — 3079F DIAST BP 80-89 MM HG: CPT | Mod: CPTII,S$GLB,, | Performed by: STUDENT IN AN ORGANIZED HEALTH CARE EDUCATION/TRAINING PROGRAM

## 2019-05-31 PROCEDURE — 3008F PR BODY MASS INDEX (BMI) DOCUMENTED: ICD-10-PCS | Mod: CPTII,S$GLB,, | Performed by: STUDENT IN AN ORGANIZED HEALTH CARE EDUCATION/TRAINING PROGRAM

## 2019-05-31 PROCEDURE — 3077F PR MOST RECENT SYSTOLIC BLOOD PRESSURE >= 140 MM HG: ICD-10-PCS | Mod: CPTII,S$GLB,, | Performed by: STUDENT IN AN ORGANIZED HEALTH CARE EDUCATION/TRAINING PROGRAM

## 2019-05-31 PROCEDURE — 99214 PR OFFICE/OUTPT VISIT, EST, LEVL IV, 30-39 MIN: ICD-10-PCS | Mod: S$GLB,,, | Performed by: STUDENT IN AN ORGANIZED HEALTH CARE EDUCATION/TRAINING PROGRAM

## 2019-05-31 PROCEDURE — 99214 OFFICE O/P EST MOD 30 MIN: CPT | Mod: S$GLB,,, | Performed by: STUDENT IN AN ORGANIZED HEALTH CARE EDUCATION/TRAINING PROGRAM

## 2019-05-31 PROCEDURE — 3077F SYST BP >= 140 MM HG: CPT | Mod: CPTII,S$GLB,, | Performed by: STUDENT IN AN ORGANIZED HEALTH CARE EDUCATION/TRAINING PROGRAM

## 2019-05-31 PROCEDURE — 99999 PR PBB SHADOW E&M-EST. PATIENT-LVL IV: CPT | Mod: PBBFAC,,, | Performed by: STUDENT IN AN ORGANIZED HEALTH CARE EDUCATION/TRAINING PROGRAM

## 2019-05-31 PROCEDURE — 3079F PR MOST RECENT DIASTOLIC BLOOD PRESSURE 80-89 MM HG: ICD-10-PCS | Mod: CPTII,S$GLB,, | Performed by: STUDENT IN AN ORGANIZED HEALTH CARE EDUCATION/TRAINING PROGRAM

## 2019-05-31 PROCEDURE — 99999 PR PBB SHADOW E&M-EST. PATIENT-LVL IV: ICD-10-PCS | Mod: PBBFAC,,, | Performed by: STUDENT IN AN ORGANIZED HEALTH CARE EDUCATION/TRAINING PROGRAM

## 2019-05-31 ASSESSMENT — ROUTINE ASSESSMENT OF PATIENT INDEX DATA (RAPID3)
AM STIFFNESS SCORE: 0, NO
TOTAL RAPID3 SCORE: 1
FATIGUE SCORE: 1.5
MDHAQ FUNCTION SCORE: .3
PATIENT GLOBAL ASSESSMENT SCORE: 1.5
PSYCHOLOGICAL DISTRESS SCORE: 1.1
PAIN SCORE: .5

## 2019-05-31 NOTE — PATIENT INSTRUCTIONS
Steroid tapering  - Please start taking 1.5 tabs (15mg ) daily x 2 weeks and then drop down to 1 tab (10mg) daily until follow up     Please stop taking   - Leucovorin   - Atovaquone    Please continue IVIG as scheduled    Please have CBC, CMP, CPK, and aldolase complete prior to the next visit     If any new symptoms arises - please call/message me immediately

## 2019-05-31 NOTE — PROGRESS NOTES
Rapid3 Question Responses and Scores 5/25/2019   MDHAQ Score 0.3   Psychologic Score 1.1   Pain Score 0.5   When you awakened in the morning OVER THE LAST WEEK, did you feel stiff? No   If Yes, please indicate the number of hours until you are as limber as you will be for the day -   Fatigue Score 1.5   Global Health Score 1.5   RAPID3 Score 1       Answers for HPI/ROS submitted by the patient on 5/25/2019   fever: No  eye redness: No  headaches: Yes  shortness of breath: No  chest pain: No  trouble swallowing: Yes  diarrhea: No  constipation: No  unexpected weight change: No  genital sore: No  dysuria: No  During the last 3 days, have you had a skin rash?: No  Bruises or bleeds easily: Yes  cough: No

## 2019-06-01 ENCOUNTER — PATIENT MESSAGE (OUTPATIENT)
Dept: RHEUMATOLOGY | Facility: CLINIC | Age: 59
End: 2019-06-01

## 2019-06-03 ENCOUNTER — TELEPHONE (OUTPATIENT)
Dept: RHEUMATOLOGY | Facility: CLINIC | Age: 59
End: 2019-06-03

## 2019-06-03 DIAGNOSIS — M33.13 DERMATOMYOSITIS: ICD-10-CM

## 2019-06-03 DIAGNOSIS — D84.821 IMMUNOSUPPRESSION DUE TO DRUG THERAPY: Primary | ICD-10-CM

## 2019-06-03 DIAGNOSIS — Z79.899 IMMUNOSUPPRESSION DUE TO DRUG THERAPY: Primary | ICD-10-CM

## 2019-06-03 RX ORDER — PREDNISONE 10 MG/1
15 TABLET ORAL DAILY
Qty: 45 TABLET | Refills: 0 | Status: SHIPPED | OUTPATIENT
Start: 2019-06-03 | End: 2019-06-27

## 2019-06-06 ENCOUNTER — INFUSION (OUTPATIENT)
Dept: INFUSION THERAPY | Facility: HOSPITAL | Age: 59
End: 2019-06-06
Attending: INTERNAL MEDICINE
Payer: COMMERCIAL

## 2019-06-06 ENCOUNTER — TELEPHONE (OUTPATIENT)
Dept: HEMATOLOGY/ONCOLOGY | Facility: CLINIC | Age: 59
End: 2019-06-06

## 2019-06-06 ENCOUNTER — LAB VISIT (OUTPATIENT)
Dept: LAB | Facility: HOSPITAL | Age: 59
End: 2019-06-06
Attending: INTERNAL MEDICINE
Payer: COMMERCIAL

## 2019-06-06 VITALS
TEMPERATURE: 98 F | RESPIRATION RATE: 18 BRPM | DIASTOLIC BLOOD PRESSURE: 75 MMHG | SYSTOLIC BLOOD PRESSURE: 135 MMHG | WEIGHT: 178.81 LBS | HEART RATE: 80 BPM | HEIGHT: 65 IN | BODY MASS INDEX: 29.79 KG/M2

## 2019-06-06 DIAGNOSIS — Z17.1 MALIGNANT NEOPLASM OF UPPER-OUTER QUADRANT OF LEFT BREAST IN FEMALE, ESTROGEN RECEPTOR NEGATIVE: Chronic | ICD-10-CM

## 2019-06-06 DIAGNOSIS — C50.412 MALIGNANT NEOPLASM OF UPPER-OUTER QUADRANT OF LEFT BREAST IN FEMALE, ESTROGEN RECEPTOR NEGATIVE: Chronic | ICD-10-CM

## 2019-06-06 DIAGNOSIS — R13.19 ESOPHAGEAL DYSPHAGIA: ICD-10-CM

## 2019-06-06 DIAGNOSIS — M33.13 DERMATOMYOSITIS: Primary | ICD-10-CM

## 2019-06-06 LAB
BASOPHILS # BLD AUTO: 0.02 K/UL (ref 0–0.2)
BASOPHILS NFR BLD: 0.6 % (ref 0–1.9)
DIFFERENTIAL METHOD: ABNORMAL
EOSINOPHIL # BLD AUTO: 0 K/UL (ref 0–0.5)
EOSINOPHIL NFR BLD: 0.9 % (ref 0–8)
ERYTHROCYTE [DISTWIDTH] IN BLOOD BY AUTOMATED COUNT: 17.2 % (ref 11.5–14.5)
HCT VFR BLD AUTO: 36.3 % (ref 37–48.5)
HGB BLD-MCNC: 10.6 G/DL (ref 12–16)
IMM GRANULOCYTES # BLD AUTO: 0 K/UL (ref 0–0.04)
IMM GRANULOCYTES NFR BLD AUTO: 0 % (ref 0–0.5)
LYMPHOCYTES # BLD AUTO: 1.5 K/UL (ref 1–4.8)
LYMPHOCYTES NFR BLD: 45.6 % (ref 18–48)
MCH RBC QN AUTO: 25.4 PG (ref 27–31)
MCHC RBC AUTO-ENTMCNC: 29.2 G/DL (ref 32–36)
MCV RBC AUTO: 87 FL (ref 82–98)
MONOCYTES # BLD AUTO: 0.6 K/UL (ref 0.3–1)
MONOCYTES NFR BLD: 20.1 % (ref 4–15)
NEUTROPHILS # BLD AUTO: 1 K/UL (ref 1.8–7.7)
NEUTROPHILS NFR BLD: 32.8 % (ref 38–73)
NRBC BLD-RTO: 0 /100 WBC
PLATELET # BLD AUTO: 133 K/UL (ref 150–350)
PMV BLD AUTO: 9.3 FL (ref 9.2–12.9)
RBC # BLD AUTO: 4.17 M/UL (ref 4–5.4)
WBC # BLD AUTO: 3.18 K/UL (ref 3.9–12.7)

## 2019-06-06 PROCEDURE — 96375 TX/PRO/DX INJ NEW DRUG ADDON: CPT

## 2019-06-06 PROCEDURE — 25000003 PHARM REV CODE 250: Performed by: INTERNAL MEDICINE

## 2019-06-06 PROCEDURE — 63600175 PHARM REV CODE 636 W HCPCS: Mod: JG | Performed by: INTERNAL MEDICINE

## 2019-06-06 PROCEDURE — 96367 TX/PROPH/DG ADDL SEQ IV INF: CPT

## 2019-06-06 PROCEDURE — 36415 COLL VENOUS BLD VENIPUNCTURE: CPT

## 2019-06-06 PROCEDURE — S0028 INJECTION, FAMOTIDINE, 20 MG: HCPCS | Performed by: INTERNAL MEDICINE

## 2019-06-06 PROCEDURE — 85025 COMPLETE CBC W/AUTO DIFF WBC: CPT

## 2019-06-06 PROCEDURE — 96366 THER/PROPH/DIAG IV INF ADDON: CPT

## 2019-06-06 PROCEDURE — 96365 THER/PROPH/DIAG IV INF INIT: CPT

## 2019-06-06 RX ORDER — SODIUM CHLORIDE 0.9 % (FLUSH) 0.9 %
10 SYRINGE (ML) INJECTION
Status: CANCELLED | OUTPATIENT
Start: 2019-07-04

## 2019-06-06 RX ORDER — FAMOTIDINE 10 MG/ML
20 INJECTION INTRAVENOUS
Status: COMPLETED | OUTPATIENT
Start: 2019-06-06 | End: 2019-06-06

## 2019-06-06 RX ORDER — HEPARIN 100 UNIT/ML
500 SYRINGE INTRAVENOUS
Status: DISCONTINUED | OUTPATIENT
Start: 2019-06-06 | End: 2019-06-06 | Stop reason: HOSPADM

## 2019-06-06 RX ORDER — ACETAMINOPHEN 325 MG/1
650 TABLET ORAL
Status: CANCELLED | OUTPATIENT
Start: 2019-07-04

## 2019-06-06 RX ORDER — ACETAMINOPHEN 325 MG/1
650 TABLET ORAL
Status: COMPLETED | OUTPATIENT
Start: 2019-06-06 | End: 2019-06-06

## 2019-06-06 RX ORDER — FAMOTIDINE 10 MG/ML
20 INJECTION INTRAVENOUS
Status: CANCELLED | OUTPATIENT
Start: 2019-07-04

## 2019-06-06 RX ORDER — HEPARIN 100 UNIT/ML
500 SYRINGE INTRAVENOUS
Status: CANCELLED | OUTPATIENT
Start: 2019-07-04

## 2019-06-06 RX ORDER — SODIUM CHLORIDE 0.9 % (FLUSH) 0.9 %
10 SYRINGE (ML) INJECTION
Status: DISCONTINUED | OUTPATIENT
Start: 2019-06-06 | End: 2019-06-06 | Stop reason: HOSPADM

## 2019-06-06 RX ADMIN — DIPHENHYDRAMINE HYDROCHLORIDE 50 MG: 50 INJECTION, SOLUTION INTRAMUSCULAR; INTRAVENOUS at 07:06

## 2019-06-06 RX ADMIN — FAMOTIDINE 20 MG: 10 INJECTION, SOLUTION INTRAVENOUS at 07:06

## 2019-06-06 RX ADMIN — ACETAMINOPHEN 650 MG: 325 TABLET ORAL at 07:06

## 2019-06-06 RX ADMIN — HUMAN IMMUNOGLOBULIN G 80 G: 40 LIQUID INTRAVENOUS at 08:06

## 2019-06-06 NOTE — TELEPHONE ENCOUNTER
Call made to patient. No answer, voicemail left to let patient know her blood counts looked better this time. Advised patient to call back to clinic if anything else is needed.        ----- Message from Alex Reyna MD sent at 6/6/2019 11:50 AM CDT -----  Let her know that her blood counts were better

## 2019-06-06 NOTE — PLAN OF CARE
Problem: Adult Inpatient Plan of Care  Goal: Plan of Care Review  Outcome: Ongoing (interventions implemented as appropriate)  Pt tolerated IVIG well.  No s/s of reaction. Vitals stable, NAD.

## 2019-06-07 ENCOUNTER — PATIENT MESSAGE (OUTPATIENT)
Dept: INTERNAL MEDICINE | Facility: CLINIC | Age: 59
End: 2019-06-07

## 2019-06-07 ENCOUNTER — INFUSION (OUTPATIENT)
Dept: INFUSION THERAPY | Facility: HOSPITAL | Age: 59
End: 2019-06-07
Attending: INTERNAL MEDICINE
Payer: COMMERCIAL

## 2019-06-07 VITALS
HEIGHT: 65 IN | TEMPERATURE: 98 F | DIASTOLIC BLOOD PRESSURE: 89 MMHG | WEIGHT: 172.38 LBS | SYSTOLIC BLOOD PRESSURE: 158 MMHG | HEART RATE: 84 BPM | BODY MASS INDEX: 28.72 KG/M2 | RESPIRATION RATE: 18 BRPM

## 2019-06-07 DIAGNOSIS — M33.13 DERMATOMYOSITIS: Primary | ICD-10-CM

## 2019-06-07 DIAGNOSIS — R13.19 ESOPHAGEAL DYSPHAGIA: ICD-10-CM

## 2019-06-07 PROCEDURE — 96375 TX/PRO/DX INJ NEW DRUG ADDON: CPT

## 2019-06-07 PROCEDURE — 96366 THER/PROPH/DIAG IV INF ADDON: CPT

## 2019-06-07 PROCEDURE — 96365 THER/PROPH/DIAG IV INF INIT: CPT

## 2019-06-07 PROCEDURE — S0028 INJECTION, FAMOTIDINE, 20 MG: HCPCS | Performed by: INTERNAL MEDICINE

## 2019-06-07 PROCEDURE — 96367 TX/PROPH/DG ADDL SEQ IV INF: CPT

## 2019-06-07 PROCEDURE — 25000003 PHARM REV CODE 250: Performed by: INTERNAL MEDICINE

## 2019-06-07 PROCEDURE — 63600175 PHARM REV CODE 636 W HCPCS: Mod: JG | Performed by: INTERNAL MEDICINE

## 2019-06-07 PROCEDURE — 25000003 PHARM REV CODE 250: Performed by: STUDENT IN AN ORGANIZED HEALTH CARE EDUCATION/TRAINING PROGRAM

## 2019-06-07 RX ORDER — SODIUM CHLORIDE 0.9 % (FLUSH) 0.9 %
10 SYRINGE (ML) INJECTION
Status: DISCONTINUED | OUTPATIENT
Start: 2019-06-07 | End: 2019-06-07 | Stop reason: HOSPADM

## 2019-06-07 RX ORDER — HEPARIN 100 UNIT/ML
500 SYRINGE INTRAVENOUS
Status: DISCONTINUED | OUTPATIENT
Start: 2019-06-07 | End: 2019-06-07 | Stop reason: HOSPADM

## 2019-06-07 RX ORDER — ACETAMINOPHEN 325 MG/1
650 TABLET ORAL
Status: CANCELLED | OUTPATIENT
Start: 2019-07-04

## 2019-06-07 RX ORDER — HEPARIN 100 UNIT/ML
500 SYRINGE INTRAVENOUS
Status: CANCELLED | OUTPATIENT
Start: 2019-07-04

## 2019-06-07 RX ORDER — ACETAMINOPHEN 325 MG/1
650 TABLET ORAL
Status: COMPLETED | OUTPATIENT
Start: 2019-06-07 | End: 2019-06-07

## 2019-06-07 RX ORDER — FAMOTIDINE 10 MG/ML
20 INJECTION INTRAVENOUS
Status: COMPLETED | OUTPATIENT
Start: 2019-06-07 | End: 2019-06-07

## 2019-06-07 RX ORDER — SODIUM CHLORIDE 0.9 % (FLUSH) 0.9 %
10 SYRINGE (ML) INJECTION
Status: CANCELLED | OUTPATIENT
Start: 2019-07-04

## 2019-06-07 RX ORDER — FAMOTIDINE 10 MG/ML
20 INJECTION INTRAVENOUS
Status: CANCELLED | OUTPATIENT
Start: 2019-07-04

## 2019-06-07 RX ADMIN — DIPHENHYDRAMINE HYDROCHLORIDE 50 MG: 50 INJECTION, SOLUTION INTRAMUSCULAR; INTRAVENOUS at 07:06

## 2019-06-07 RX ADMIN — FAMOTIDINE 20 MG: 10 INJECTION, SOLUTION INTRAVENOUS at 07:06

## 2019-06-07 RX ADMIN — HEPARIN 500 UNITS: 100 SYRINGE at 12:06

## 2019-06-07 RX ADMIN — HUMAN IMMUNOGLOBULIN G 80 G: 20 LIQUID INTRAVENOUS at 08:06

## 2019-06-07 RX ADMIN — SODIUM CHLORIDE: 0.9 INJECTION, SOLUTION INTRAVENOUS at 07:06

## 2019-06-07 RX ADMIN — ACETAMINOPHEN 650 MG: 325 TABLET ORAL at 07:06

## 2019-06-07 NOTE — PLAN OF CARE
Problem: Adult Inpatient Plan of Care  Goal: Plan of Care Review  Outcome: Ongoing (interventions implemented as appropriate)  Pt tolerated IVIG with no complications. VSS. Pt instructed to call MD with any problems. NAD. Pt discharged home independently.

## 2019-06-10 ENCOUNTER — PATIENT MESSAGE (OUTPATIENT)
Dept: GASTROENTEROLOGY | Facility: CLINIC | Age: 59
End: 2019-06-10

## 2019-06-13 ENCOUNTER — TELEPHONE (OUTPATIENT)
Dept: HEMATOLOGY/ONCOLOGY | Facility: CLINIC | Age: 59
End: 2019-06-13

## 2019-06-13 NOTE — TELEPHONE ENCOUNTER
Spoke with patient to let her know that her labs resulted and look good, so she doesn't need to come for the weekly CBCs anymore. Pt grateful to hear this and verbalized understanding of this information.       ----- Message from Alex Reyna MD sent at 6/13/2019  2:06 PM CDT -----  Labs look good, no more weekly CBCs

## 2019-06-18 ENCOUNTER — OFFICE VISIT (OUTPATIENT)
Dept: INTERNAL MEDICINE | Facility: CLINIC | Age: 59
End: 2019-06-18
Payer: COMMERCIAL

## 2019-06-18 VITALS
DIASTOLIC BLOOD PRESSURE: 82 MMHG | SYSTOLIC BLOOD PRESSURE: 124 MMHG | BODY MASS INDEX: 29.2 KG/M2 | HEART RATE: 92 BPM | HEIGHT: 65 IN | WEIGHT: 175.25 LBS | TEMPERATURE: 98 F

## 2019-06-18 DIAGNOSIS — Z87.11 HISTORY OF GASTRIC ULCER: ICD-10-CM

## 2019-06-18 DIAGNOSIS — M33.13 DERMATOMYOSITIS: Primary | ICD-10-CM

## 2019-06-18 DIAGNOSIS — Z23 NEED FOR PNEUMOCOCCAL VACCINATION: ICD-10-CM

## 2019-06-18 DIAGNOSIS — Z17.1 MALIGNANT NEOPLASM OF UPPER-OUTER QUADRANT OF LEFT BREAST IN FEMALE, ESTROGEN RECEPTOR NEGATIVE: Chronic | ICD-10-CM

## 2019-06-18 DIAGNOSIS — D84.9 IMMUNOSUPPRESSION: ICD-10-CM

## 2019-06-18 DIAGNOSIS — G62.0 CHEMOTHERAPY-INDUCED NEUROPATHY: ICD-10-CM

## 2019-06-18 DIAGNOSIS — Z93.1 STATUS POST INSERTION OF PERCUTANEOUS ENDOSCOPIC GASTROSTOMY (PEG) TUBE: ICD-10-CM

## 2019-06-18 DIAGNOSIS — Z23 NEED FOR VACCINATION WITH 13-POLYVALENT PNEUMOCOCCAL CONJUGATE VACCINE: ICD-10-CM

## 2019-06-18 DIAGNOSIS — C50.412 MALIGNANT NEOPLASM OF UPPER-OUTER QUADRANT OF LEFT BREAST IN FEMALE, ESTROGEN RECEPTOR NEGATIVE: Chronic | ICD-10-CM

## 2019-06-18 DIAGNOSIS — T45.1X5A CHEMOTHERAPY-INDUCED NEUROPATHY: ICD-10-CM

## 2019-06-18 PROCEDURE — 99999 PR PBB SHADOW E&M-EST. PATIENT-LVL IV: CPT | Mod: PBBFAC,,, | Performed by: INTERNAL MEDICINE

## 2019-06-18 PROCEDURE — 90471 IMMUNIZATION ADMIN: CPT | Mod: S$GLB,,, | Performed by: INTERNAL MEDICINE

## 2019-06-18 PROCEDURE — 3008F PR BODY MASS INDEX (BMI) DOCUMENTED: ICD-10-PCS | Mod: CPTII,S$GLB,, | Performed by: INTERNAL MEDICINE

## 2019-06-18 PROCEDURE — 90670 PNEUMOCOCCAL CONJUGATE VACCINE 13-VALENT LESS THAN 5YO & GREATER THAN: ICD-10-PCS | Mod: S$GLB,,, | Performed by: INTERNAL MEDICINE

## 2019-06-18 PROCEDURE — 3079F PR MOST RECENT DIASTOLIC BLOOD PRESSURE 80-89 MM HG: ICD-10-PCS | Mod: CPTII,S$GLB,, | Performed by: INTERNAL MEDICINE

## 2019-06-18 PROCEDURE — 3074F PR MOST RECENT SYSTOLIC BLOOD PRESSURE < 130 MM HG: ICD-10-PCS | Mod: CPTII,S$GLB,, | Performed by: INTERNAL MEDICINE

## 2019-06-18 PROCEDURE — 3079F DIAST BP 80-89 MM HG: CPT | Mod: CPTII,S$GLB,, | Performed by: INTERNAL MEDICINE

## 2019-06-18 PROCEDURE — 99214 OFFICE O/P EST MOD 30 MIN: CPT | Mod: 25,S$GLB,, | Performed by: INTERNAL MEDICINE

## 2019-06-18 PROCEDURE — 99999 PR PBB SHADOW E&M-EST. PATIENT-LVL IV: ICD-10-PCS | Mod: PBBFAC,,, | Performed by: INTERNAL MEDICINE

## 2019-06-18 PROCEDURE — 3008F BODY MASS INDEX DOCD: CPT | Mod: CPTII,S$GLB,, | Performed by: INTERNAL MEDICINE

## 2019-06-18 PROCEDURE — 3074F SYST BP LT 130 MM HG: CPT | Mod: CPTII,S$GLB,, | Performed by: INTERNAL MEDICINE

## 2019-06-18 PROCEDURE — 90471 PNEUMOCOCCAL CONJUGATE VACCINE 13-VALENT LESS THAN 5YO & GREATER THAN: ICD-10-PCS | Mod: S$GLB,,, | Performed by: INTERNAL MEDICINE

## 2019-06-18 PROCEDURE — 99214 PR OFFICE/OUTPT VISIT, EST, LEVL IV, 30-39 MIN: ICD-10-PCS | Mod: 25,S$GLB,, | Performed by: INTERNAL MEDICINE

## 2019-06-18 PROCEDURE — 90670 PCV13 VACCINE IM: CPT | Mod: S$GLB,,, | Performed by: INTERNAL MEDICINE

## 2019-06-18 NOTE — PROGRESS NOTES
Subjective:       Patient ID: Ermelinda Verde is a 58 y.o. female.    Chief Complaint: Follow-up    HPI last saw pt 3/25/19 and at that point s/p chemo for recurrent breast CA.    Since visit:  MBSS 3/28/19 - mild swallowing dysfunction - mild/mod pharyngeal residue after the swallow that could be cleared w/ repeat swallows and heat turn w/ swallows. Cont ST.   S/p radiation w/ Dr. Lopez end of April.  Dr. Rose 5/27/19 - f/u in 6 mo for CBE, R MMG 11/2019, and cont PT.  Last seen Dr. Reyna 5/30/19. Plan for repeat PET in 1 mo.   Dr. Swift w/ Dr. Campos 5/31/19 - prednisone taper (15mg x 2 weeks and then 10mg daily), referred to GI for Cscope for colon CA screening, cont folic acid 4mg, d/c leucovorin 5mg daily and atovaquone, Ca 1200mg and Vit D 1000 units daily, omeprazole 40mg BID, IVIG as scheduled.    She uses the PEG tube in the morning when she's very hungry and sometimes at night. However eating during the day. No aspiration. No abdominal pain.     Finished outpt PT. Feels like strength is much better. Able to do some housework. No falls.     Some numbness/tingling at the tips of feet and hands.    Review of Systems   Constitutional: Negative for activity change and unexpected weight change.   HENT: Negative for hearing loss, rhinorrhea and trouble swallowing.    Eyes: Positive for visual disturbance (corrected w/ her old glasses). Negative for discharge.   Respiratory: Negative for chest tightness and wheezing.    Cardiovascular: Negative for chest pain and palpitations.   Gastrointestinal: Negative for blood in stool, constipation, diarrhea and vomiting.   Endocrine: Negative for polydipsia and polyuria.   Genitourinary: Negative for difficulty urinating, dysuria, hematuria and menstrual problem.   Musculoskeletal: Negative for arthralgias, joint swelling and neck pain.   Neurological: Negative for weakness and headaches.   Psychiatric/Behavioral: Negative for confusion and dysphoric mood.      "  Objective:      Physical Exam    /82 (BP Location: Left arm, Patient Position: Sitting, BP Method: Large (Manual))   Pulse 92   Temp 98.2 °F (36.8 °C)   Ht 5' 5" (1.651 m)   Wt 79.5 kg (175 lb 4.3 oz)   LMP  (LMP Unknown)   BMI 29.17 kg/m²     Gen - A+OX4, NAD  HEENT - PERRL, OP clear. MMM.   CV - RRR, I/VI GLORIA best at RUSB.   CHEST - CTAB, no wheezing/rhonchi  Abd - S/NT/ND/+BS. Peg in place.   Ext - 2+ B radial and DP pulses. No LE edema.   MSK - no spinal tenderness to palpation. 5/5 BUE and BLE m strength. Normal gait.     Labs reviewed.     Assessment/Plan     Ermelinda was seen today for follow-up.    Diagnoses and all orders for this visit:    Dermatomyositis - on prednisone 10mg daily. Doing well. F/u w/ rheum.     History of gastric ulcer - needs repeat EGD.   -     Ambulatory referral to Gastroenterology    Status post insertion of percutaneous endoscopic gastrostomy (PEG) tube - recommend to stop morning can of feed and only get nutrition orally. If she can maintain her weight/nutrition for a month w/o depending on PEG, likely can have PEG out.   -     Ambulatory referral to Gastroenterology    Malignant neoplasm of upper-outer quadrant of left breast in female, estrogen receptor negative - s/p L mastectomy 2017. S/p chemo and radiation. Has repeat PET this Friday. F/u w/ H/O.     Chemotherapy-induced neuropathy - not very bothersome. Discussed good foot and hand hygiene - check daily to make sure no cuts.     Immunosuppression - will give prevnar. Shingrix at outside pharmacy. Pneumovax at next visit.     Follow up in about 6 months (around 12/18/2019).      Reta Weeks MD  Department of Internal Medicine - [facility] [address]  6:57 AM  "

## 2019-06-18 NOTE — PROGRESS NOTES
INTERNAL MEDICINE INITIAL VISIT NOTE      CHIEF COMPLAINT     No chief complaint on file.      WALESKA Verde is a 58 y.o. *** female who presents with a PMHx of  ***.    Past Medical History:  Past Medical History:   Diagnosis Date    Breast cancer 2017    left    Depression     Diverticulosis     Gastritis     Hypertension     Vitamin B12 deficiency 3/8/2018       Past Surgical History:  Past Surgical History:   Procedure Laterality Date    BIOPSY-SENTINEL NODE Left 2017    Performed by Alfredo English MD at [facility] OR    BREAST BIOPSY Left     BREAST RECONSTRUCTION Left 2017     SECTION      COLONOSCOPY  2011    repeat in 10 yrs.    D&C      EGD (ESOPHAGOGASTRODUODENOSCOPY) N/A 2019    Performed by [facility]ell [facility] at [facility] END[facility] (2ND FLR)    INSERTION, PEG TUBE N/A 2019    Performed by Zurdo Gordon [facility] at [facility] (2ND FLR)    SUNVJVYVC-SJLC-W-CATH Right 10/31/2018    Performed by Deo [facility]encia [facility] at [facility] OR 2ND FLR    LYNQHTGGE-VYMI-Z-CATH Right 2017    Performed by [facility] at [facility] OR    NZQFQMCTZ-QLTI-N-CATH-neck or chest Fluoro needed Consent AM of surgery Right 10/30/2018    Performed by [facility] at [facility] OR 2ND FLR    MASTECTOMY Left 2017    MASTECTOMY Left 2017    Performed by [facility] at [facility]    MYOMECTOMY      PORTACATH PLACEMENT      RECONSTRUCTION-BREAST/FLAP-SCOTT Left 2017    Performed by [facility]jinder Sewell MD at [facility] OR    SENTINEL LYMPH NODE BIOPSY  2017    left    TOTAL REDUCTION MAMMOPLASTY Right 2017       Allergies:  Review of patient's allergies indicates:  No Known Allergies    Home Medications:  Prior to Admission medications    Medication Sig Start Date End Date Taking? Authorizing Provider   (Magic mouthwash) 1:1:1 Benadryl 12.5mg/5ml liq, aluminum & magnesium hydroxide-simehticone (Maalox), lidocaine viscous 2% Swish and spit 10 mLs every 4 (four) hours as needed.  for mouth sores 4/12/19   Jose Lopez Jr., MD   acetaminophen (TYLENOL) 325 MG tablet 650 mg by Tube route every 4 (four) hours as needed for Pain. 2/27/19   Historical Provider, MD   benzonatate (TESSALON PERLES) 100 MG capsule Take 1 capsule (100 mg total) by mouth every 6 (six) hours as needed for Cough. 5/2/19 5/1/20  Alex Reyna MD   fluticasone (FLONASE) 50 mcg/actuation nasal spray SHAKE LIQUID AND USE 1 SPRAY(50 MCG) IN EACH NOSTRIL EVERY DAY 2/13/19   Chau Mcguire MD   folic acid (FOLVITE) 1 MG tablet TAKE 4 TABLETS(4 MG) VIA GTUBE EVERY DAY 4/25/19   Clarissa Swift MD   hydrocodone-homatropine 5-1.5 mg/5 ml (HYCODAN) 5-1.5 mg/5 mL Syrp Take 5 mLs by mouth every 4 (four) hours as needed. 5/6/19   Alex Reyna MD   omeprazole (PRILOSEC) 40 MG capsule Take 1 capsule (40 mg total) by mouth 2 (two) times daily before meals. 3/25/19 3/24/20  Reta Weeks MD   predniSONE (DELTASONE) 10 MG tablet Take 1.5 tablets (15 mg total) by mouth once daily. 15mg (1.5 tabs) x 2 weeks and then 10mg (1 tab) until follow up 6/3/19 7/3/19  Clarissa Swift MD   senna-docusate 8.6-50 mg (PERICOLACE) 8.6-50 mg per tablet 2 tablets by Tube route daily with lunch. 2/27/19   Historical Provider, MD   sucralfate (CARAFATE) 100 mg/mL suspension Take 10 mLs (1 g total) by mouth 4 (four) times daily. 5/17/19   Jose Lopez Jr., MD   triamcinolone acetonide 0.1% (KENALOG) 0.1 % cream STEVIE EXT AA BID FOR 7 DAYS 3/27/19   Historical Provider, MD       Family History:  Family History   Problem Relation Age of Onset    Heart disease Father     Hypertension Father     Breast cancer Sister 52    Hypertension Mother     No Known Problems Brother     Thyroid disease Daughter         hyperthyroidism s/p thyroidectomy    No Known Problems Son     No Known Problems Brother     No Known Problems Brother     No Known Problems Sister     No Known Problems Daughter     Colon cancer Neg Hx     Ovarian cancer Neg Hx         Social History:  Social History     Tobacco Use    Smoking status: Never Smoker    Smokeless tobacco: Never Used   Substance Use Topics    Alcohol use: Yes     Frequency: Never     Drinks per session: Patient refused     Binge frequency: Never     Comment: wine    Drug use: No       Review of Systems:  Review of Systems    Health Maintainence:   Td ***  Flu ***  Prevnar ***  Pneumovax ***  Zoster ***  MMG***  C-SCOPE ***  DEXA ***  Hep C screening ***  Low Dose CT scan in pts if 55-81 y/o with 30 pack yr smoking hx and currently smoke or have quit within past 15 yrs. ***    PHYSICAL EXAM     LMP  (LMP Unknown)     GEN - A+OX4, NAD   HEENT - PERRL, EOMI, OP clear. MMM.   Neck - No thyromegaly or cervical LAD. No thyroid masses felt.  CV - RRR, no m/r   Chest - CTAB, no wheezing or rhonchi  Abd - S/NT/ND/+BS.   Ext - 2+BDP and radial pulses. No LE edema.   Neuro - PERRL, EOMI, no nystagmus, eyebrow raise, facial sensation, hearing, m of mastication, smile, palatal raise, shoulder shrug, tongue protrusion symmetric and intact. 5/5 BUE and BLE strength. Sensation to light touch intact throughout. 2+ DTRs. Normal gait.   MSK - No spinal tenderness to palpation. Normal gait.   Skin - No rash.    LABS     Previous labs reviewed.    ASSESSMENT/PLAN     Ermelinda Verde is a 58 y.o. female with  There are no diagnoses linked to this encounter.      RTC in *** months, sooner if needed and depending on labs.    Reta Weeks MD  Department of Internal Medicine - [facility]  6:57 AM

## 2019-06-18 NOTE — PATIENT INSTRUCTIONS
Gastroenterology - Dr. Rosas, Dr. Trevino, Dr. Knight.     Shingrix (shingles vaccine) at local pharmacy.

## 2019-06-19 NOTE — PROGRESS NOTES
RHEUMATOLOGY CLINIC FOLLOW UP VISIT    Chief complaints:- Myositis       HPI:-  Ermelinda Javed a 58 y.o.F with history of L sided infiltrating ductal carcinoma of the L breast (dx on Jan 2017), HTN, depression, gastritis, and recently diagnosed myositis (uncertain if it's autoimmune vs medication induced), recently discharge to rehab present to the clinic today for hospital follow up.    Patient was initially send from hem/onc clinic for evaluation of possible inflammatory myositis.  Patient was hospitalized from 1/22/19 till 2/13/19 for myositis.      L breast cancer s/p completion of 4 cycles of demi-adjuvant taxotere and cytoxan (completed on 5/9/17) and mastectomy (6/26/17) and then 4 cycles of adjuvant Adriamycin (completed 9/12/17). In 10/2017 - patient developed L supraclavicular lymphadenopathy which FNA confirmed as reoccurrence of previously treated breast cancer.      Patient started on Atelizumab/Abraxane on 11/7/18. Per chart review, patient noticed rashes on the dorsum of her hands on 11/11/18. Seen in urgent care and given topical steroid cream. Then received two more infusions on Atelizumab/Abraxane on 11/21/18 and 12/5/18. Started to notice puffiness around the eyes on 12/11/18. Received another Abraxane infusion on 12/12/18 but this time with hydrocortisone 50 mg which helped reduce the swelling around the eyes. Developed swelling of the face again on 12/15/18. Next infusion of Abraxane done on 12/19/18 with Solucortef and Atelizumab held. Abraxane given again on 1/3/19 with no IV steroid. Patient developed swelling of the face on 1/4/19 and went to urgent care and given short course of prednisone 20 mg BID. On 1/15/19, patient seen in hem/onc clinic with c/o of pressure and tightness around neck. CT scan of chest and neck did not reveal any vascular compression. Started on prednisone 60 mg with taper. On 1/22/18 patient with c/o proximal muscle weakness. CPK found to  be elevated around 4k. Patient admitted and given solumedrol 80 mg IV on 1/22/18. Last infusion of Atelizumab on 12/19/18. Last infusion of Abraxane on 1/3/19. Given Solumedrol 1g x 1 on 1/23/18. Seen by Dermatology on 1/24/18 and biopsy done of skin rash. Given distribution of rashes, there was concern for dermatomyositis. Patient started on Solumedrol 125 mg IV BID at this time.     During her hospitalization patient was started on high dose steroid Solumedrol 80mg IV x 1, 1g x 1, and then 125mg BID until tapered down to medrol 48mg.  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  Patient was started on IVIG (400mg/kg/daily x 5 day) 1/29/19-2/2.   Patient started on MTX 20mg SQ 2/5 with folic acid. MTX SQ was transitioned to PO because concern about rehab administration.  Patient failed swallow eval and PEG tube was placed.        Denies any family history of autoimmune diseases.     No smoking, EtOH, recreational drug usage.     Denies any photosensitivity, joint swelling, unintentional weigh loss, abdominal pain, night sweats, CP, SOB.  +oral thrush.     Completed rehab at [facility].  Completed IVIG (2/28 and 3/1).  Had mild HA after infusion.  Doing well.  A lot stronger - able to do household chores since discharge.  Not back at baseline yet ~80%.  Mild weakness with increased activities.  Able to ambulate without assistance (but is still a fall precaution).  Tolerating diet via PEG tube.  HHC and PT 2x/week.     MTX discontinued 2/18 with concern of toxicity (decrease blood counts and transaminatis).  Folic acid increased to 4mg daily.  Still on medrol 32mg daily with atorvaquone for PCP prophylaxis.  Bactrim d/c because of interaction with leucovorin.  Leucovorin 5mg daily started     Passed swallow eval - PEG tube still in place (until radiation is completed).  Tolerating diet without any problems.  Doing home PT and feels well.      Interval History  Radiation completed (28 days) completed May 8.   Doing well.  Continues to muscle strength improvement.  Doing home exercises.  Feels like she is 90-95% from her baseline prior to the onset of the symptoms    PEG tube still in place.  Was having some throat irration post-radiation therapy. Tolerating diet orally.  PEG will be removed at the end of July following EGD and colonoscopy.      Last PET scan (June 20) showed negative for metastasis.  At this time, will monitor per oncology and repeat PET scan in Sept.  No treatment needed at this time.     Denies any new rashes, alopecia, arthralgia, abdominal pain, CP, or SOB, N/V, fever/chills.       Review of Systems   Constitutional: Negative for chills, diaphoresis, fever, malaise/fatigue and weight loss.   HENT: Negative for congestion, ear discharge, ear pain, hearing loss, nosebleeds, sinus pain and tinnitus.    Eyes: Negative for photophobia, pain, discharge and redness.   Respiratory: Negative for cough, hemoptysis, sputum production, shortness of breath, wheezing and stridor.    Cardiovascular: Negative for chest pain, palpitations, orthopnea, claudication, leg swelling and PND.   Gastrointestinal: Negative for abdominal pain, constipation, diarrhea, heartburn, nausea and vomiting.   Genitourinary: Negative for dysuria, frequency, hematuria and urgency.   Musculoskeletal: Negative for back pain, joint pain, myalgias and neck pain.   Skin: Negative for rash.   Neurological: Positive for weakness. Negative for dizziness, tingling, tremors and headaches.   Endo/Heme/Allergies: Does not bruise/bleed easily.   Psychiatric/Behavioral: Negative for depression, hallucinations and suicidal ideas. The patient is not nervous/anxious and does not have insomnia.        Past Medical History:   Diagnosis Date    Breast cancer 01/01/2017    left    Depression     Diverticulosis     Gastritis     Hypertension     Vitamin B12 deficiency 3/8/2018       Past Surgical History:   Procedure Laterality Date    BIOPSY-SENTINEL  NODE Left 2017    [facility] at [facility] OR    BREAST BIOPSY Left     BREAST RECONSTRUCTION Left 2017     SECTION      COLONOSCOPY  2011    repeat in 10 yrs.    D&C      EGD (ESOPHAGOGASTRODUODENOSCOPY) N/A 2019    [facility] (2ND FLR)    INSERTION, PEG TUBE N/A 2019    [facility]formed by Zurdo Gordon [facility] at [facility] (2ND FLR)    [id number]-VEWF-A-CATH Right 10/31/2018    [facility] by [facility]encia [facility] at [facility] 2ND FLR    EQDOARNDL-FYLC-R-CATH Right 2017    Performed by [facility] at [facility] OR    [id number]-LOAO-X-CATH-neck or chest Fluoro needed Consent AM of surgery Right 10/30/2018    Per[facility] at [facility] 2ND FLR    MASTECTOMY Left 2017    MASTECTOMY Left 2017    [facility]    MYOMECTOMY      PORTACATH PLACEMENT      RECONSTRUCTION-BREAST/FLAP-SCOTT Left 2017    Performed [facility] at [facility] OR    SENTINEL LYMPH NODE BIOPSY  2017    left    TOTAL REDUCTION MAMMOPLASTY Right 2017        Social History     Tobacco Use    Smoking status: Never Smoker    Smokeless tobacco: Never Used   Substance Use Topics    Alcohol use: Yes     Frequency: Never     Drinks per session: Patient refused     Binge frequency: Never     Comment: wine    Drug use: No       Family History   Problem Relation Age of Onset    Heart disease Father     Hypertension Father     Breast cancer Sister 52    Hypertension Mother     No Known Problems Brother     Thyroid disease Daughter         hyperthyroidism s/p thyroidectomy    No Known Problems Son     No Known Problems Brother     No Known Problems Brother     No Known Problems Sister     No Known Problems Daughter     Colon cancer Neg Hx     Ovarian cancer Neg Hx     Celiac disease Neg Hx     Cirrhosis Neg Hx     Colon polyps Neg Hx     Esophageal cancer Neg Hx     Inflammatory bowel  "disease Neg Hx     Liver cancer Neg Hx     Liver disease Neg Hx     Rectal cancer Neg Hx     Stomach cancer Neg Hx     Ulcerative colitis Neg Hx        Review of patient's allergies indicates:  No Known Allergies        Physical examination:-    Vitals:    06/27/19 1310 06/27/19 1311   BP: (!) 158/97 (!) 149/96   Pulse: 90 83   Weight: 82.1 kg (181 lb)    Height: 5' 5" (1.651 m)    PainSc: 0-No pain        Physical Exam   Constitutional: She is oriented to person, place, and time and well-developed, well-nourished, and in no distress.   HENT:   Head: Normocephalic and atraumatic.   Eyes: Pupils are equal, round, and reactive to light. Conjunctivae are normal.   Neck: Normal range of motion. Neck supple. Tracheal deviation: tpmt.   Cardiovascular: Normal rate, regular rhythm and normal heart sounds.   Pulmonary/Chest: Effort normal and breath sounds normal.   Abdominal: Soft. Bowel sounds are normal. Rebound: pmt.   Musculoskeletal: She exhibits no edema or tenderness.   Right Side Rheumatological Exam      Muscle Strength (0-5 scale):  Neck Flexion:  5  Neck Extension: 5  Deltoid:  5  Biceps: 5/5   Triceps:  5  : 5/5   Iliopsoas: 5  Quadriceps:  5   Distal Lower Extremity: 5     Left Side Rheumatological Exam      Muscle Strength (0-5 scale):  Neck Flexion:  5  Neck Extension: 5  Deltoid:  5  Biceps: 5/5   Triceps:  5  :  5  Iliopsoas: 5  Quadriceps:  5   Distal Lower Extremity: 5     ROM intact   Neurological: She is alert and oriented to person, place, and time. Gait normal.   Skin: Skin is warm and dry.   Guttron's papules on hands - improved   Nape of the neck and bilateral lower extremities (shins) rash still present - improvement from hospitalization      Psychiatric: Mood, memory, affect and judgment normal.       Radiographs:-  Independent visualization of images done.   CXR (1/28) - clear lungs  PET scan - no pulmonary/GI activity.       Medication List with Changes/Refills   New Medications    " PREDNISONE (DELTASONE) 1 MG TABLET    Take 4 tablets (4 mg total) by mouth once daily. 9mg daily with 1mg taper every 2 weeks    PREDNISONE (DELTASONE) 5 MG TABLET    Take 1 tablet (5 mg total) by mouth once daily.   Current Medications    ACETAMINOPHEN (TYLENOL) 325 MG TABLET    650 mg by Tube route every 4 (four) hours as needed for Pain.    SENNA-DOCUSATE 8.6-50 MG (PERICOLACE) 8.6-50 MG PER TABLET    2 tablets by Tube route daily with lunch.   Changed and/or Refilled Medications    Modified Medication Previous Medication    FOLIC ACID (FOLVITE) 1 MG TABLET folic acid (FOLVITE) 1 MG tablet       TAKE 4 TABLETS(4 MG) VIA GTUBE EVERY DAY    TAKE 4 TABLETS(4 MG) VIA GTUBE EVERY DAY    OMEPRAZOLE (PRILOSEC) 40 MG CAPSULE omeprazole (PRILOSEC) 40 MG capsule       Take 1 capsule (40 mg total) by mouth 2 (two) times daily before meals.    Take 1 capsule (40 mg total) by mouth 2 (two) times daily before meals.   Discontinued Medications    PREDNISONE (DELTASONE) 10 MG TABLET    Take 1.5 tablets (15 mg total) by mouth once daily. 15mg (1.5 tabs) x 2 weeks and then 10mg (1 tab) until follow up       Assessment/Plans:-   58 y.o.F with history of L sided infiltrating ductal carcinoma of the L breast (dx on Jan 2017), HTN, depression, gastritis, and recently diagnosed myositis (uncertain if it's autoimmune vs medication induced), recently discharge to rehab present to the clinic today for hospital follow up.       Patient started on Atelizumab/Abraxane on 11/7/18. Per chart review, patient noticed rashes on the dorsum of her hands on 11/11/18. Seen in urgent care and given topical steroid cream. Then received two more infusions on Atelizumab/Abraxane on 11/21/18 and 12/5/18. Started to notice puffiness around the eyes on 12/11/18. Received another Abraxane infusion on 12/12/18 but this time with hydrocortisone 50 mg which helped reduce the swelling around the eyes. Developed swelling of the face again on 12/15/18. Next  infusion of Abraxane done on 12/19/18 with Solucortef and Atelizumab held. Abraxane given again on 1/3/19 with no IV steroid. Patient developed swelling of the face on 1/4/19 and went to urgent care and given short course of prednisone 20 mg BID. On 1/15/19, patient seen in hem/onc clinic with c/o of pressure and tightness around neck. CT scan of chest and neck did not reveal any vascular compression. Started on prednisone 60 mg with taper. On 1/22/18 patient with c/o proximal muscle weakness. CPK found to be elevated around 4k. Patient admitted and given solumedrol 80 mg IV on 1/22/18. Last infusion of Atelizumab on 12/19/18. Last infusion of Abraxane on 1/3/19. Given Solumedrol 1g x 1 on 1/23/18. Seen by Dermatology on 1/24/18 and biopsy done of skin rash. Given distribution of rashes, there was concern for dermatomyositis. Patient started on Solumedrol 125 mg IV BID at that time.  Skin biopsy resulted consistent with dermatomyositis.     Labs: CPK and Aldolase normalized. +DARCY 1:640 speckled with negative profile.  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 quantTB: indeterminate, T-spot: negative     Spirometry: normal, normal diffusion capacity. FVC: 81%, DLCO: 114%     Patient exhibits signs of dermatomyositis (heliotropic rash and gottron's papules).   Dermatomyositis can be associated with malignancy.  MRI of LUE showed edema of the deltoid and biceps.  Exam with proximal muscle weakness: b/l deltoids 4/5, b/l iliopsoas 4.4/5. 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   Given +TIF1 gamma  positivity, most likely dermatomyositis is from underlying malignancy.      Failed 2nd swallow eval on 2/6/19. PEG placed 2/7/2019.     Current medications:  - prednisone 10mg   - IVIG 1/29/19-2/2, 2/28-3/1, 4/1-2, 5/6-7, 6/6-7  - folic acid daily 4mg    Esophageal biopsy - negative for viral infection.  Nonspecific chronic inflammation.    Patient is an intermediate metabolizer based on TPMT.  Cannot start imuran.  Labs still neutropenic and thrombocytopenic at this time - uncertain of the cause (radiation induce BM fibrosis vs medication induce?)  If labs does not improve/worsening of symptoms while tapering of steroid- consideration to start patient on Rituximab.  If labs improve, patient can be started on Cellcept.     Plan:  - CBC, CMP, CPK, Aldolase to be done prior to next visit  - continue prednisone tapering. Prednisone 9mg Qday with 1mg tapering every 2 weeks.  Stay at 5mg until follow up.   - GI referral for colonoscopy (cancer screening).  Last colonoscopy was 2011/2012 - per patient normal and next scheduled colonoscopy was in 10 years. Colonoscopy and EGD scheduled for next month   - continue folic acid 4mg daily  - continue muscle strengthening exercise daily  - will need to have all vaccination complete - Prevnar, shingles  - 1200 mg dietary calcium and Vitamin D3 1000 mg daily  - continue H2 blocker  - continue IVIG as previously scheduled   - message/call immediately if worsening muscle weakness      # RTC in 8 weeks       Answers for HPI/ROS submitted by the patient on 6/24/2019   trouble swallowing: No  unexpected weight change: No  genital sore: No

## 2019-06-21 ENCOUNTER — HOSPITAL ENCOUNTER (OUTPATIENT)
Dept: RADIOLOGY | Facility: HOSPITAL | Age: 59
Discharge: HOME | End: 2019-06-21
Attending: INTERNAL MEDICINE
Payer: COMMERCIAL

## 2019-06-21 DIAGNOSIS — C50.412 MALIGNANT NEOPLASM OF UPPER-OUTER QUADRANT OF LEFT BREAST IN FEMALE, ESTROGEN RECEPTOR NEGATIVE: Chronic | ICD-10-CM

## 2019-06-21 DIAGNOSIS — C77.9 REGIONAL LYMPH NODE METASTASIS PRESENT: ICD-10-CM

## 2019-06-21 DIAGNOSIS — Z17.1 MALIGNANT NEOPLASM OF UPPER-OUTER QUADRANT OF LEFT BREAST IN FEMALE, ESTROGEN RECEPTOR NEGATIVE: Chronic | ICD-10-CM

## 2019-06-21 LAB — POCT GLUCOSE: 88 MG/DL (ref 70–110)

## 2019-06-21 PROCEDURE — A9552 F18 FDG: HCPCS

## 2019-06-21 PROCEDURE — 78815 PET IMAGE W/CT SKULL-THIGH: CPT | Mod: 26,PS,, | Performed by: RADIOLOGY

## 2019-06-21 PROCEDURE — 78815 PET IMAGE W/CT SKULL-THIGH: CPT | Mod: TC

## 2019-06-21 PROCEDURE — 78815 NM PET CT ROUTINE: ICD-10-PCS | Mod: 26,PS,, | Performed by: RADIOLOGY

## 2019-06-24 NOTE — PROGRESS NOTES
Subjective:       Patient ID: Ermelinda Verde is a 58 y.o. female.    Chief Complaint: No chief complaint on file.    HPI Mrs Verde is  a very pleasant 58-year-old -American female who returns for a diagnosis of recurrent triple negative left breast cancer.    She is S/P 3 cycles of nab-paclitaxel and Atezolizumab (last treatment in January 2019).  Her treatment was complicated by the development of dermatomyositis which resulted in the lengthy hospitalization from January 22nd to February 13th.   She has been treated with steroids, IVIG, and low-dose methotrexate.  She is currently on 10 mg of prednisone is due to be tapered off of that.  Her last IVIG dose is in July.    Today she reports that she has been doing much better overall.  Her strength is improved and she is back to doing a number of her previous activities including driving and doing grocery shopping.  Her swallowing is doing well.  She is due to have EGD, colonoscopy and PEG tube removal sometime in the near future.  She is having no shortness of breath or unusual pain.        Onc History:  She developed a palpable abnormality in her left breast in January 2017 which she noted on self-examination.  A diagnostic mammogram on January 19 showed a greater than 1 cm nodule in the upper outer portion of left breast.  By ultrasound this was lobulated and hypoechoic measuring 1.75 x 1.51 x 1.96 cm.     On January 24, 2017 a core needle biopsy was performed which showed infiltrating ductal carcinoma, high grade.  The tumor was ER negative, PA negative, and HER-2 negative.  A follow-up ultrasound on December 6 showed 2.5 x 2.2 x 1.5 cm left breast mass.  There was no abnormality noted in the left axilla.     She underwent sentinel lymph node biopsy on February 22.  That showed 4 negative lymph nodes.     She had 4 cycles of  Wilbur-adjuvantTaxotere and Cytoxan completed  on 5/9/17.     On June 26 she underwent left mastectomy.  That revealed 2 foci of  invasive high-grade carcinoma measuring 14 mm and 1.5 mm.  Margins were negative.    She completed 4 cycles of adjuvant Adriamycin on September 12, 2017.      In October 2018, she developed some left supraclavicular lymphadenopathy which turned out to be recurrence.     A fine-needle aspirate of the lymph node was performed on October 19th.  That showed metastatic carcinoma consistent with breast primary which was ER negative, ID negative and HER 2-negative.    She then received 3 cycles of Nab paclitaxel and atezolizumab.  She last received treatment on January 3, 2019.    PET-CT in March showed complete response.    She completed consolidation radiation therapy in May 2019.  Review of Systems   Constitutional: Negative for appetite change and unexpected weight change.   Eyes: Negative for visual disturbance.   Respiratory: Negative for cough and shortness of breath.    Cardiovascular: Negative for chest pain.   Gastrointestinal: Negative for abdominal pain and diarrhea.   Genitourinary: Negative for frequency.   Musculoskeletal: Negative for back pain.   Skin: Negative for rash.   Neurological: Negative for headaches.   Hematological: Negative for adenopathy.   Psychiatric/Behavioral: The patient is not nervous/anxious.        Objective:      Physical Exam   Constitutional: She is oriented to person, place, and time. She appears well-developed and well-nourished. No distress.   HENT:   Mouth/Throat: No oropharyngeal exudate.   Cardiovascular: Normal rate and regular rhythm.   Pulmonary/Chest: Effort normal and breath sounds normal. Right breast exhibits no mass, no nipple discharge and no skin change.       Abdominal: Soft. She exhibits no mass. There is no tenderness.   Lymphadenopathy:     She has no cervical adenopathy.   Neurological: She is alert and oriented to person, place, and time.   Psychiatric: She has a normal mood and affect. Her behavior is normal. Thought content normal.   Vitals reviewed.       Assessment:     PET-CT shows no evidence of active metastatic disease    CBC shows white count 2380 with an ANC of 1100, hemoglobin 10.9 and platelet count 467991.    Metabolic profile is normal, CPK is normal.  1. Malignant neoplasm of upper-outer quadrant of left breast in female, estrogen receptor negative    2. Regional lymph node metastasis present        Plan:       Return to clinic in 3 months time with follow-up imaging and blood work.

## 2019-06-25 ENCOUNTER — OFFICE VISIT (OUTPATIENT)
Dept: HEMATOLOGY/ONCOLOGY | Facility: CLINIC | Age: 59
End: 2019-06-25
Payer: COMMERCIAL

## 2019-06-25 ENCOUNTER — TELEPHONE (OUTPATIENT)
Dept: GASTROENTEROLOGY | Facility: CLINIC | Age: 59
End: 2019-06-25

## 2019-06-25 ENCOUNTER — PATIENT MESSAGE (OUTPATIENT)
Dept: RHEUMATOLOGY | Facility: CLINIC | Age: 59
End: 2019-06-25

## 2019-06-25 ENCOUNTER — PATIENT MESSAGE (OUTPATIENT)
Dept: GASTROENTEROLOGY | Facility: CLINIC | Age: 59
End: 2019-06-25

## 2019-06-25 ENCOUNTER — LAB VISIT (OUTPATIENT)
Dept: LAB | Facility: HOSPITAL | Age: 59
End: 2019-06-25
Payer: COMMERCIAL

## 2019-06-25 ENCOUNTER — OFFICE VISIT (OUTPATIENT)
Dept: GASTROENTEROLOGY | Facility: CLINIC | Age: 59
End: 2019-06-25
Payer: COMMERCIAL

## 2019-06-25 VITALS
OXYGEN SATURATION: 99 % | HEIGHT: 65 IN | WEIGHT: 178.56 LBS | SYSTOLIC BLOOD PRESSURE: 152 MMHG | TEMPERATURE: 98 F | HEART RATE: 93 BPM | RESPIRATION RATE: 20 BRPM | BODY MASS INDEX: 29.75 KG/M2 | DIASTOLIC BLOOD PRESSURE: 82 MMHG

## 2019-06-25 VITALS
BODY MASS INDEX: 29.68 KG/M2 | HEART RATE: 91 BPM | DIASTOLIC BLOOD PRESSURE: 86 MMHG | WEIGHT: 178.13 LBS | HEIGHT: 65 IN | SYSTOLIC BLOOD PRESSURE: 145 MMHG

## 2019-06-25 DIAGNOSIS — K22.10 EROSIVE ESOPHAGITIS: Primary | ICD-10-CM

## 2019-06-25 DIAGNOSIS — R79.89 ELEVATED LFTS: ICD-10-CM

## 2019-06-25 DIAGNOSIS — D69.6 THROMBOCYTOPENIA: ICD-10-CM

## 2019-06-25 DIAGNOSIS — C50.412 MALIGNANT NEOPLASM OF UPPER-OUTER QUADRANT OF LEFT BREAST IN FEMALE, ESTROGEN RECEPTOR NEGATIVE: Primary | Chronic | ICD-10-CM

## 2019-06-25 DIAGNOSIS — Z17.1 MALIGNANT NEOPLASM OF UPPER-OUTER QUADRANT OF LEFT BREAST IN FEMALE, ESTROGEN RECEPTOR NEGATIVE: ICD-10-CM

## 2019-06-25 DIAGNOSIS — C50.412 MALIGNANT NEOPLASM OF UPPER-OUTER QUADRANT OF LEFT BREAST IN FEMALE, ESTROGEN RECEPTOR NEGATIVE: ICD-10-CM

## 2019-06-25 DIAGNOSIS — Z79.899 IMMUNOSUPPRESSION DUE TO DRUG THERAPY: ICD-10-CM

## 2019-06-25 DIAGNOSIS — C77.9 REGIONAL LYMPH NODE METASTASIS PRESENT: ICD-10-CM

## 2019-06-25 DIAGNOSIS — Z12.11 ENCOUNTER FOR SCREENING COLONOSCOPY: ICD-10-CM

## 2019-06-25 DIAGNOSIS — Z17.1 MALIGNANT NEOPLASM OF UPPER-OUTER QUADRANT OF LEFT BREAST IN FEMALE, ESTROGEN RECEPTOR NEGATIVE: Primary | Chronic | ICD-10-CM

## 2019-06-25 DIAGNOSIS — M33.13 DERMATOMYOSITIS: ICD-10-CM

## 2019-06-25 DIAGNOSIS — Z12.11 SPECIAL SCREENING FOR MALIGNANT NEOPLASMS, COLON: Primary | ICD-10-CM

## 2019-06-25 DIAGNOSIS — Z85.3 HISTORY OF LEFT BREAST CANCER: ICD-10-CM

## 2019-06-25 DIAGNOSIS — D84.821 IMMUNOSUPPRESSION DUE TO DRUG THERAPY: ICD-10-CM

## 2019-06-25 DIAGNOSIS — D64.9 LOW HEMOGLOBIN: ICD-10-CM

## 2019-06-25 LAB
ALBUMIN SERPL BCP-MCNC: 3.2 G/DL (ref 3.5–5.2)
ALP SERPL-CCNC: 70 U/L (ref 55–135)
ALT SERPL W/O P-5'-P-CCNC: 13 U/L (ref 10–44)
ANION GAP SERPL CALC-SCNC: 6 MMOL/L (ref 8–16)
AST SERPL-CCNC: 32 U/L (ref 10–40)
BASOPHILS # BLD AUTO: 0.01 K/UL (ref 0–0.2)
BASOPHILS NFR BLD: 0.4 % (ref 0–1.9)
BILIRUB SERPL-MCNC: 0.2 MG/DL (ref 0.1–1)
BUN SERPL-MCNC: 13 MG/DL (ref 6–20)
CALCIUM SERPL-MCNC: 9.5 MG/DL (ref 8.7–10.5)
CHLORIDE SERPL-SCNC: 106 MMOL/L (ref 95–110)
CK SERPL-CCNC: 69 U/L (ref 20–180)
CO2 SERPL-SCNC: 28 MMOL/L (ref 23–29)
CREAT SERPL-MCNC: 0.8 MG/DL (ref 0.5–1.4)
DIFFERENTIAL METHOD: ABNORMAL
EOSINOPHIL # BLD AUTO: 0 K/UL (ref 0–0.5)
EOSINOPHIL NFR BLD: 1.7 % (ref 0–8)
ERYTHROCYTE [DISTWIDTH] IN BLOOD BY AUTOMATED COUNT: 17.2 % (ref 11.5–14.5)
EST. GFR  (AFRICAN AMERICAN): >60 ML/MIN/1.73 M^2
EST. GFR  (NON AFRICAN AMERICAN): >60 ML/MIN/1.73 M^2
GLUCOSE SERPL-MCNC: 97 MG/DL (ref 70–110)
HCT VFR BLD AUTO: 36.4 % (ref 37–48.5)
HGB BLD-MCNC: 10.9 G/DL (ref 12–16)
IMM GRANULOCYTES # BLD AUTO: 0.01 K/UL (ref 0–0.04)
IMM GRANULOCYTES NFR BLD AUTO: 0.4 % (ref 0–0.5)
LYMPHOCYTES # BLD AUTO: 0.8 K/UL (ref 1–4.8)
LYMPHOCYTES NFR BLD: 32.8 % (ref 18–48)
MCH RBC QN AUTO: 25.8 PG (ref 27–31)
MCHC RBC AUTO-ENTMCNC: 29.9 G/DL (ref 32–36)
MCV RBC AUTO: 86 FL (ref 82–98)
MONOCYTES # BLD AUTO: 0.5 K/UL (ref 0.3–1)
MONOCYTES NFR BLD: 18.9 % (ref 4–15)
NEUTROPHILS # BLD AUTO: 1.1 K/UL (ref 1.8–7.7)
NEUTROPHILS NFR BLD: 45.8 % (ref 38–73)
NRBC BLD-RTO: 0 /100 WBC
PLATELET # BLD AUTO: 136 K/UL (ref 150–350)
PMV BLD AUTO: 9.6 FL (ref 9.2–12.9)
POTASSIUM SERPL-SCNC: 3.9 MMOL/L (ref 3.5–5.1)
PROT SERPL-MCNC: 8.4 G/DL (ref 6–8.4)
RBC # BLD AUTO: 4.22 M/UL (ref 4–5.4)
SODIUM SERPL-SCNC: 140 MMOL/L (ref 136–145)
WBC # BLD AUTO: 2.38 K/UL (ref 3.9–12.7)

## 2019-06-25 PROCEDURE — 99999 PR PBB SHADOW E&M-EST. PATIENT-LVL IV: ICD-10-PCS | Mod: PBBFAC,,, | Performed by: INTERNAL MEDICINE

## 2019-06-25 PROCEDURE — 3079F PR MOST RECENT DIASTOLIC BLOOD PRESSURE 80-89 MM HG: ICD-10-PCS | Mod: CPTII,S$GLB,, | Performed by: INTERNAL MEDICINE

## 2019-06-25 PROCEDURE — 99999 PR PBB SHADOW E&M-EST. PATIENT-LVL III: CPT | Mod: PBBFAC,,, | Performed by: INTERNAL MEDICINE

## 2019-06-25 PROCEDURE — 99215 OFFICE O/P EST HI 40 MIN: CPT | Mod: S$GLB,,, | Performed by: INTERNAL MEDICINE

## 2019-06-25 PROCEDURE — 82550 ASSAY OF CK (CPK): CPT

## 2019-06-25 PROCEDURE — 99215 PR OFFICE/OUTPT VISIT, EST, LEVL V, 40-54 MIN: ICD-10-PCS | Mod: S$GLB,,, | Performed by: INTERNAL MEDICINE

## 2019-06-25 PROCEDURE — 3008F PR BODY MASS INDEX (BMI) DOCUMENTED: ICD-10-PCS | Mod: CPTII,S$GLB,, | Performed by: INTERNAL MEDICINE

## 2019-06-25 PROCEDURE — 99214 PR OFFICE/OUTPT VISIT, EST, LEVL IV, 30-39 MIN: ICD-10-PCS | Mod: S$GLB,,, | Performed by: INTERNAL MEDICINE

## 2019-06-25 PROCEDURE — 85025 COMPLETE CBC W/AUTO DIFF WBC: CPT

## 2019-06-25 PROCEDURE — 3077F PR MOST RECENT SYSTOLIC BLOOD PRESSURE >= 140 MM HG: ICD-10-PCS | Mod: CPTII,S$GLB,, | Performed by: INTERNAL MEDICINE

## 2019-06-25 PROCEDURE — 82085 ASSAY OF ALDOLASE: CPT

## 2019-06-25 PROCEDURE — 99999 PR PBB SHADOW E&M-EST. PATIENT-LVL III: ICD-10-PCS | Mod: PBBFAC,,, | Performed by: INTERNAL MEDICINE

## 2019-06-25 PROCEDURE — 99214 OFFICE O/P EST MOD 30 MIN: CPT | Mod: S$GLB,,, | Performed by: INTERNAL MEDICINE

## 2019-06-25 PROCEDURE — 3079F DIAST BP 80-89 MM HG: CPT | Mod: CPTII,S$GLB,, | Performed by: INTERNAL MEDICINE

## 2019-06-25 PROCEDURE — 36415 COLL VENOUS BLD VENIPUNCTURE: CPT

## 2019-06-25 PROCEDURE — 80053 COMPREHEN METABOLIC PANEL: CPT

## 2019-06-25 PROCEDURE — 3008F BODY MASS INDEX DOCD: CPT | Mod: CPTII,S$GLB,, | Performed by: INTERNAL MEDICINE

## 2019-06-25 PROCEDURE — 99999 PR PBB SHADOW E&M-EST. PATIENT-LVL IV: CPT | Mod: PBBFAC,,, | Performed by: INTERNAL MEDICINE

## 2019-06-25 PROCEDURE — 3077F SYST BP >= 140 MM HG: CPT | Mod: CPTII,S$GLB,, | Performed by: INTERNAL MEDICINE

## 2019-06-25 RX ORDER — POLYETHYLENE GLYCOL 3350, SODIUM SULFATE ANHYDROUS, SODIUM BICARBONATE, SODIUM CHLORIDE, POTASSIUM CHLORIDE 236; 22.74; 6.74; 5.86; 2.97 G/4L; G/4L; G/4L; G/4L; G/4L
4 POWDER, FOR SOLUTION ORAL ONCE
Qty: 4000 ML | Refills: 0 | Status: SHIPPED | OUTPATIENT
Start: 2019-06-25 | End: 2019-06-25

## 2019-06-25 NOTE — LETTER
June 25, 2019      Reta Weeks MD  [address]  [facility] [address]           [facility]  [address]  [facility] [address]  Phone: [phone]  Fax: [phone]          Patient: Ermelinda Verde   MR Number: [id number]   YOB: 1960   Date of Visit: 6/25/2019       Dear Dr. Reta Weeks:    Thank you for referring Ermelinda Verde to me for evaluation. Attached you will find relevant portions of my assessment and plan of care.    If you have questions, please do not hesitate to call me. I look forward to following Ermelinda Verde along with you.    Sincerely,    Aidan Knight MD    Enclosure  CC:  No Recipients    If you would like to receive this communication electronically, please contact [email or web address] or [phone] to request more information on [employer] Link access.    For providers and/or their staff who would like to refer a patient to [facility], please contact us through our one-stop-shop provider referral line, [facility], at [phone].    If you feel you have received this communication in error or would no longer like to receive these types of communications, please e-mail [email or web address]

## 2019-06-25 NOTE — TELEPHONE ENCOUNTER
Spoke with patient.  Scheduled for lab and ultrasound on 6/27 and hepatology on 7/11 for 1:40 with April Cody NP.

## 2019-06-25 NOTE — TELEPHONE ENCOUNTER
Message left for patient to return my call regarding scheduling lab work, ultrasound, and an appointment with hepatology.

## 2019-06-25 NOTE — TELEPHONE ENCOUNTER
----- Message from Tammie Jean Baptiste sent at 6/25/2019  4:27 PM CDT -----  Contact: pt#[phone]  Patient Returning Call from [facility]    Who Left Message for Patient:Belkys  Communication Preference:call  Additional Information:

## 2019-06-25 NOTE — LETTER
July 10, 2019        Reta Weeks MD  [address]  [place] [address]             [facility]  [address]  [facility] [address]  Phone: [phone]  Fax: [phone]   Patient: Ermelinda Verde   MR Number: [id number]   YOB: 1960   Date of Visit: 6/25/2019       Dear Dr. Weeks:    Thank you for referring Ermelinda Verde to me for evaluation. Below are the relevant portions of my assessment and plan of care.            If you have questions, please do not hesitate to call me. I look forward to following Ermelinda along with you.    Sincerely,      Aidan Knight MD           CC  No Recipients

## 2019-06-25 NOTE — PROGRESS NOTES
[facility] Consultation Note    Reason for Consult:  The primary encounter diagnosis was Erosive esophagitis. Diagnoses of Low hemoglobin, History of left breast cancer, Encounter for screening colonoscopy, Elevated LFTs, and Thrombocytopenia were also pertinent to this visit.    PCP:   Reta Weeks   [address] / [place] [address]    Referring MD:  Reta Weeks Md  [address] [place] [address]    Initial History of Present Illness (HPI):  This is a 58 y.o. female here for evaluation of for PEG tube removal.  Patient had PEG tube placed by Dr. Gordon for dysphagia which has since resolved.  Patient no longer needs her PEG tube has been eating by mouth without difficulty. She did have an EGD in the hospital by Dr. Rosas which showed LA grade C esophagitis she has been on double dose PPI she has not done her follow-up EGD with Dr. rosas for surveillance.  Her last colonoscopy was close to 10 years she does have no family history of colon cancer no GI malignancies in the family no history of known liver disease no Jo syndrome no FAP no attenuated FAP no MAP never had colon cancer never had colon polyps before.  She would like to do her colonoscopy at the same time as her EGD in use her PEG tube for her bowel prep and then have the PEG tube removed after her EGD and colonoscopy wall still sedated.    Abdominal pain - no  Reflux - no  Dysphagia - no   Bowel habits - normal  GI bleeding - none  NSAID usage - none    Interval HPI 06/25/2019:  The patient's last visit with me was on Visit date not found.      ROS:  Constitutional: No fevers, chills, No weight loss  ENT:  No heartburn no dysphagia no odynophagia no hoarseness  CV: No chest pain, no palpitation  Pulm: No cough, No shortness of breath, no wheezing  Ophtho: No vision changes  GI: see HPI  Derm: No rash, no itching  Heme: No lymphadenopathy, No easy bruising  MSK: No significant arthritis  : No dysuria, No  hematuria  Endo: No hot or cold intolerance  Neuro: No syncope, No seizure, no strokes  Psych: No uncontrolled anxiety, No uncontrolled depression    Medical History:  has a past medical history of Breast cancer (2017), Depression, Diverticulosis, Gastritis, Hypertension, and Vitamin B12 deficiency (3/8/2018).    Surgical History:  has a past surgical history that includes D&C;  section; Portacath placement; [place] lymph node biopsy (2017); Breast biopsy (Left); Myomectomy; Colonoscopy (2011); Breast reconstruction (Left, 2017); Total Reduction Mammoplasty (Right, 2017); Insertion of tunneled central venous catheter (CVC) with subcutaneous port (Right, 10/31/2018); Mastectomy (Left, 2017); and Esophagogastroduodenoscopy (N/A, 2019).    Family History: family history includes Breast cancer (age of onset: 52) in her sister; Heart disease in her father; Hypertension in her father and mother; No Known Problems in her brother, brother, brother, daughter, sister, and son; Thyroid disease in her daughter..     Social History:  reports that she has never smoked. She has never used smokeless tobacco. She reports that she drinks alcohol. She reports that she does not use drugs.    Review of patient's allergies indicates:  No Known Allergies    Medication List with Changes/Refills   Current Medications    ACETAMINOPHEN (TYLENOL) 325 MG TABLET    650 mg by Tube route every 4 (four) hours as needed for Pain.    FOLIC ACID (FOLVITE) 1 MG TABLET    TAKE 4 TABLETS(4 MG) VIA GTUBE EVERY DAY    OMEPRAZOLE (PRILOSEC) 40 MG CAPSULE    Take 1 capsule (40 mg total) by mouth 2 (two) times daily before meals.    PREDNISONE (DELTASONE) 10 MG TABLET    Take 1.5 tablets (15 mg total) by mouth once daily. 15mg (1.5 tabs) x 2 weeks and then 10mg (1 tab) until follow up    SENNA-DOCUSATE 8.6-50 MG (PERICOLACE) 8.6-50 MG PER TABLET    2 tablets by Tube route daily with lunch.   Discontinued Medications     "(MAGIC MOUTHWASH) 1:1:1 BENADRYL 12.5MG/5ML LIQ, ALUMINUM & MAGNESIUM HYDROXIDE-SIMEHTICONE (MAALOX), LIDOCAINE VISCOUS 2%    Swish and spit 10 mLs every 4 (four) hours as needed. for mouth sores         Objective Findings:    Vital Signs:  BP (!) 145/86   Pulse 91   Ht 5' 5" (1.651 m)   Wt 80.8 kg (178 lb 2.1 oz)   LMP  (LMP Unknown)   BMI 29.64 kg/m²   Body mass index is 29.64 kg/m².    Physical Exam:  General Appearance: Well appearing in no acute distress  Eyes:    No scleral icterus  ENT:  No lesions or masses   Lungs: CTA bilaterally, no wheezes, no rhonchi, no rales  Heart:  S1, S2 normal, no murmurs heard  Abdomen:  Non distended, soft, no guarding, no rebound, no tenderness, no appreciated ascites, no bruits, no hepatosplenomegaly,  No CVA tenderness, she has a small reducible umbilical hernia. Peg tube is in left upper quadrant external bolster 4 cm peg tube rotates easily.  Skin around the PEG tube site looks great no erythema no induration no fluctuance no crepitance no necrosis no drainage no discharge.  Peg tube is intact.  Musculoskeletal:  No major joint deformities  Skin: No petechiae or rash on exposed skin areas  Neurologic:  Alert and oriented x4  Psychiatric:  Normal speech mentation and affect    Labs:  Lab Results   Component Value Date    WBC 3.17 (L) 06/13/2019    HGB 10.5 (L) 06/13/2019    HCT 34.5 (L) 06/13/2019     (L) 06/13/2019    CHOL 199 04/04/2019    TRIG 132 04/04/2019    HDL 64 04/04/2019    ALT 24 05/30/2019    ALT 24 05/30/2019    AST 44 (H) 05/30/2019    AST 44 (H) 05/30/2019     05/30/2019     05/30/2019    K 3.6 05/30/2019    K 3.6 05/30/2019     05/30/2019     05/30/2019    CREATININE 0.7 05/30/2019    CREATININE 0.7 05/30/2019    BUN 10 05/30/2019    BUN 10 05/30/2019    CO2 27 05/30/2019    CO2 27 05/30/2019    TSH 3.586 01/22/2019    INR 1.1 01/22/2019    HGBA1C 6.3 (H) 01/22/2019           :      Medical Decision Making:    EGD and " colonoscopy talk given PEG tube removal talk given.  Labs reviewed prior EGD images reviewed.  Patient with elevated LFTs low platelets not on chemo currently hep C negative hep B negative.  Will recommend ultrasound abdomen with venous Doppler referral to hepatology for further evaluation  For possibility underlying liver disease.    Assessment:  1. Erosive esophagitis    2. Low hemoglobin    3. History of left breast cancer    4. Encounter for screening colonoscopy    5. Elevated LFTs    6. Thrombocytopenia         Recommendations:   1.  EGD and colonoscopy for follow-up erosive esophagitis healing as well as screening colonoscopy patient would like to use her PEG tube for her bowel prep  And then after her EGD and colonoscopy to have her PEG tube removed while still sedated.  2.  Low platelets elevated LFTs will recommend follow up LFT labs ultrasound of the abdomen with venous Doppler referral to hepatology for further evaluation.  Recommend patient avoid alcohol currently and avoid all raw shellfish including raw clams and raw oysters.  3.  Recommend patient follow up with her primary care doctor make sure vaccinations are up-to-date including hepatitis a and B immunizations as well as the flu vaccine and the Pneumovax and shingles vaccine.    Follow up if symptoms worsen or fail to improve.      Order summary:  Orders Placed This Encounter    US Abdomen Complete with Doppler (xpd)    Alpha 1 Antitrypsin Phenotype    DARCY Screen w/Reflex    Antimitochondrial antibody    Anti-smooth muscle antibody    Ceruloplasmin    Ferritin    Iron and TIBC    IgG    IgA    Tissue transglutaminase, IgA    Hepatitis A antibody, IgG    Hepatitis B Surface Antibody, Qual/Quant    Hepatic function panel    Protime-INR    Ambulatory Referral to Hepatology    Case request GI: EGD (ESOPHAGOGASTRODUODENOSCOPY), COLONOSCOPY         Thank you so much for allowing me to participate in the care of Ermelinda WILLIAMSON  Ammon Knight MD

## 2019-06-26 LAB — ALDOLASE SERPL-CCNC: 2.5 U/L (ref 1.2–7.6)

## 2019-06-27 ENCOUNTER — HOSPITAL ENCOUNTER (OUTPATIENT)
Dept: RADIOLOGY | Facility: HOSPITAL | Age: 59
Discharge: HOME | End: 2019-06-27
Attending: INTERNAL MEDICINE
Payer: COMMERCIAL

## 2019-06-27 ENCOUNTER — OFFICE VISIT (OUTPATIENT)
Dept: RHEUMATOLOGY | Facility: CLINIC | Age: 59
End: 2019-06-27
Payer: COMMERCIAL

## 2019-06-27 VITALS
BODY MASS INDEX: 30.16 KG/M2 | SYSTOLIC BLOOD PRESSURE: 149 MMHG | HEIGHT: 65 IN | HEART RATE: 83 BPM | WEIGHT: 181 LBS | DIASTOLIC BLOOD PRESSURE: 96 MMHG

## 2019-06-27 DIAGNOSIS — Z85.3 HISTORY OF LEFT BREAST CANCER: ICD-10-CM

## 2019-06-27 DIAGNOSIS — M33.20 POLYMYOSITIS ASSOCIATED WITH AUTOIMMUNE DISEASE: ICD-10-CM

## 2019-06-27 DIAGNOSIS — K22.10 EROSIVE ESOPHAGITIS: ICD-10-CM

## 2019-06-27 DIAGNOSIS — D84.821 IMMUNOSUPPRESSION DUE TO DRUG THERAPY: Primary | ICD-10-CM

## 2019-06-27 DIAGNOSIS — K25.9 GASTRIC ULCER, UNSPECIFIED CHRONICITY, UNSPECIFIED WHETHER GASTRIC ULCER HEMORRHAGE OR PERFORATION PRESENT: ICD-10-CM

## 2019-06-27 DIAGNOSIS — M60.9 MYOSITIS, UNSPECIFIED MYOSITIS TYPE, UNSPECIFIED SITE: ICD-10-CM

## 2019-06-27 DIAGNOSIS — M33.13 DERMATOMYOSITIS: ICD-10-CM

## 2019-06-27 DIAGNOSIS — Z12.11 ENCOUNTER FOR SCREENING COLONOSCOPY: ICD-10-CM

## 2019-06-27 DIAGNOSIS — D64.9 LOW HEMOGLOBIN: ICD-10-CM

## 2019-06-27 DIAGNOSIS — Z17.1 MALIGNANT NEOPLASM OF UPPER-OUTER QUADRANT OF LEFT BREAST IN FEMALE, ESTROGEN RECEPTOR NEGATIVE: ICD-10-CM

## 2019-06-27 DIAGNOSIS — Z79.899 IMMUNOSUPPRESSION DUE TO DRUG THERAPY: Primary | ICD-10-CM

## 2019-06-27 DIAGNOSIS — Z09 HOSPITAL DISCHARGE FOLLOW-UP: ICD-10-CM

## 2019-06-27 DIAGNOSIS — C50.412 MALIGNANT NEOPLASM OF UPPER-OUTER QUADRANT OF LEFT BREAST IN FEMALE, ESTROGEN RECEPTOR NEGATIVE: ICD-10-CM

## 2019-06-27 DIAGNOSIS — D69.6 THROMBOCYTOPENIA: ICD-10-CM

## 2019-06-27 DIAGNOSIS — R79.89 ELEVATED LFTS: ICD-10-CM

## 2019-06-27 DIAGNOSIS — Z79.899 ENCOUNTER FOR LONG-TERM CURRENT USE OF HIGH RISK MEDICATION: ICD-10-CM

## 2019-06-27 DIAGNOSIS — M35.9 POLYMYOSITIS ASSOCIATED WITH AUTOIMMUNE DISEASE: ICD-10-CM

## 2019-06-27 PROCEDURE — 3008F PR BODY MASS INDEX (BMI) DOCUMENTED: ICD-10-PCS | Mod: CPTII,S$GLB,, | Performed by: STUDENT IN AN ORGANIZED HEALTH CARE EDUCATION/TRAINING PROGRAM

## 2019-06-27 PROCEDURE — 99214 OFFICE O/P EST MOD 30 MIN: CPT | Mod: S$GLB,,, | Performed by: STUDENT IN AN ORGANIZED HEALTH CARE EDUCATION/TRAINING PROGRAM

## 2019-06-27 PROCEDURE — 99214 PR OFFICE/OUTPT VISIT, EST, LEVL IV, 30-39 MIN: ICD-10-PCS | Mod: S$GLB,,, | Performed by: STUDENT IN AN ORGANIZED HEALTH CARE EDUCATION/TRAINING PROGRAM

## 2019-06-27 PROCEDURE — 3080F PR MOST RECENT DIASTOLIC BLOOD PRESSURE >= 90 MM HG: ICD-10-PCS | Mod: CPTII,S$GLB,, | Performed by: STUDENT IN AN ORGANIZED HEALTH CARE EDUCATION/TRAINING PROGRAM

## 2019-06-27 PROCEDURE — 99999 PR PBB SHADOW E&M-EST. PATIENT-LVL IV: ICD-10-PCS | Mod: PBBFAC,,, | Performed by: STUDENT IN AN ORGANIZED HEALTH CARE EDUCATION/TRAINING PROGRAM

## 2019-06-27 PROCEDURE — 3080F DIAST BP >= 90 MM HG: CPT | Mod: CPTII,S$GLB,, | Performed by: STUDENT IN AN ORGANIZED HEALTH CARE EDUCATION/TRAINING PROGRAM

## 2019-06-27 PROCEDURE — 3077F PR MOST RECENT SYSTOLIC BLOOD PRESSURE >= 140 MM HG: ICD-10-PCS | Mod: CPTII,S$GLB,, | Performed by: STUDENT IN AN ORGANIZED HEALTH CARE EDUCATION/TRAINING PROGRAM

## 2019-06-27 PROCEDURE — 3077F SYST BP >= 140 MM HG: CPT | Mod: CPTII,S$GLB,, | Performed by: STUDENT IN AN ORGANIZED HEALTH CARE EDUCATION/TRAINING PROGRAM

## 2019-06-27 PROCEDURE — 3008F BODY MASS INDEX DOCD: CPT | Mod: CPTII,S$GLB,, | Performed by: STUDENT IN AN ORGANIZED HEALTH CARE EDUCATION/TRAINING PROGRAM

## 2019-06-27 PROCEDURE — 93975 VASCULAR STUDY: CPT | Mod: TC

## 2019-06-27 PROCEDURE — 93975 US ABDOMEN COMP WITH DOPPLER (XPD): ICD-10-PCS | Mod: 26,,, | Performed by: RADIOLOGY

## 2019-06-27 PROCEDURE — 93975 VASCULAR STUDY: CPT | Mod: 26,,, | Performed by: RADIOLOGY

## 2019-06-27 PROCEDURE — 99999 PR PBB SHADOW E&M-EST. PATIENT-LVL IV: CPT | Mod: PBBFAC,,, | Performed by: STUDENT IN AN ORGANIZED HEALTH CARE EDUCATION/TRAINING PROGRAM

## 2019-06-27 RX ORDER — FOLIC ACID 1 MG/1
TABLET ORAL
Qty: 120 TABLET | Refills: 2 | Status: SHIPPED | OUTPATIENT
Start: 2019-06-27 | End: 2019-09-04 | Stop reason: SDUPTHER

## 2019-06-27 RX ORDER — PREDNISONE 1 MG/1
4 TABLET ORAL DAILY
Qty: 120 TABLET | Refills: 0 | Status: SHIPPED | OUTPATIENT
Start: 2019-06-27 | End: 2019-07-22

## 2019-06-27 RX ORDER — OMEPRAZOLE 40 MG/1
40 CAPSULE, DELAYED RELEASE ORAL
Qty: 60 CAPSULE | Refills: 11 | Status: SHIPPED | OUTPATIENT
Start: 2019-06-27 | End: 2019-09-04

## 2019-06-27 RX ORDER — PREDNISONE 5 MG/1
5 TABLET ORAL DAILY
Qty: 30 TABLET | Refills: 2 | Status: SHIPPED | OUTPATIENT
Start: 2019-06-27 | End: 2019-07-22

## 2019-06-27 ASSESSMENT — ROUTINE ASSESSMENT OF PATIENT INDEX DATA (RAPID3)
PSYCHOLOGICAL DISTRESS SCORE: 2.2
MDHAQ FUNCTION SCORE: .2
AM STIFFNESS SCORE: 0, NO
PAIN SCORE: 1
FATIGUE SCORE: 1
TOTAL RAPID3 SCORE: 1.22
PATIENT GLOBAL ASSESSMENT SCORE: 2

## 2019-06-27 NOTE — PROGRESS NOTES
Rapid3 Question Responses and Scores 6/24/2019   MDHAQ Score 0.2   Psychologic Score 2.2   Pain Score 1   When you awakened in the morning OVER THE LAST WEEK, did you feel stiff? No   If Yes, please indicate the number of hours until you are as limber as you will be for the day -   Fatigue Score 1   Global Health Score 2   RAPID3 Score 1.22

## 2019-06-27 NOTE — PATIENT INSTRUCTIONS
Please start tapering prednisone   - Start with 9mg every day x 2 weeks and then taper 1mg every two weeks.  Please stay at 5mg until follow up    Labs - CBC, CMP, CPK, and aldolase to be done prior to next visit       Continue taking folic acid 4mg every day.

## 2019-06-27 NOTE — PROGRESS NOTES
58 year old female with breast cancer s/p surgery and chemo and recurrence on immunomodulators and that led to myositis : proximal muscle weakness,positive myomarker,elevated ck,aldolase,muscle biopsy and MRI confirmed    She is s/p steroids  Now on 10 mg     On   IVIG + x 4 infusions   Plan was give for a year    Was on     MTX : deranged LFTs,hence stopped feb 2019  LFTs all better now    On folic acid  Took leucovorin     Has ongoing cytopenias : leukopenia   Anemia needing transfusion  Thrombocytopenia     TMPT intermediate metaboliser    Doing well  Nml ck,aldolase   Normal strength  nml diet tolerance     S/p radiation for breast cancer  Off all immunomodulators    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   Cytopenias concerning    Plan     Taper prednisone 1 mg every 2 weeks  Continue IVIG  Ask hemonc if she will get a BM biopsy,which will help us decide further immunosuppression if necessary and also evaluate of cytopenias   Ck,aldolase in 8 weeks             Answers for HPI/ROS submitted by the patient on 6/24/2019   fever: No  eye redness: No  headaches: No  shortness of breath: No  chest pain: No  trouble swallowing: No  diarrhea: No  constipation: No  unexpected weight change: No  genital sore: No  dysuria: No  During the last 3 days, have you had a skin rash?: No  Bruises or bleeds easily: No  cough: No

## 2019-07-01 ENCOUNTER — PATIENT MESSAGE (OUTPATIENT)
Dept: INTERNAL MEDICINE | Facility: CLINIC | Age: 59
End: 2019-07-01

## 2019-07-01 DIAGNOSIS — D50.9 IRON DEFICIENCY ANEMIA, UNSPECIFIED IRON DEFICIENCY ANEMIA TYPE: Primary | ICD-10-CM

## 2019-07-01 RX ORDER — FERROUS SULFATE 325(65) MG
325 TABLET ORAL EVERY 12 HOURS
Qty: 60 TABLET | Refills: 4 | Status: SHIPPED | OUTPATIENT
Start: 2019-07-01 | End: 2020-01-14

## 2019-07-02 ENCOUNTER — PATIENT MESSAGE (OUTPATIENT)
Dept: GASTROENTEROLOGY | Facility: CLINIC | Age: 59
End: 2019-07-02

## 2019-07-02 ENCOUNTER — PATIENT MESSAGE (OUTPATIENT)
Dept: HEMATOLOGY/ONCOLOGY | Facility: CLINIC | Age: 59
End: 2019-07-02

## 2019-07-02 ENCOUNTER — TELEPHONE (OUTPATIENT)
Dept: GASTROENTEROLOGY | Facility: CLINIC | Age: 59
End: 2019-07-02

## 2019-07-02 NOTE — TELEPHONE ENCOUNTER
----- Message from Aidan Knight MD sent at 7/1/2019  6:18 PM CDT -----  Devery you have immunity to hepatitis a and B which is great your slightly iron deficient and recommend you take iron replacement.    Ap - please tell patient that they are iron deficient and anaemic and recommend that they take ferrous sulfate one 325mg pill every 12 hours for next 4 months and repeat fasting hemoglobin and Ferritin in 8 weeks - Orders placed.    Please keep hepatology follow-up for elevated anti smooth muscle antibody IgG and DARCY and liver ultrasound with some complex liver cysts.

## 2019-07-03 ENCOUNTER — PATIENT MESSAGE (OUTPATIENT)
Dept: HEMATOLOGY/ONCOLOGY | Facility: CLINIC | Age: 59
End: 2019-07-03

## 2019-07-08 ENCOUNTER — INFUSION (OUTPATIENT)
Dept: INFUSION THERAPY | Facility: HOSPITAL | Age: 59
End: 2019-07-08
Attending: INTERNAL MEDICINE
Payer: COMMERCIAL

## 2019-07-08 VITALS
HEIGHT: 65 IN | HEART RATE: 87 BPM | BODY MASS INDEX: 30.16 KG/M2 | TEMPERATURE: 98 F | RESPIRATION RATE: 18 BRPM | WEIGHT: 181 LBS | SYSTOLIC BLOOD PRESSURE: 148 MMHG | DIASTOLIC BLOOD PRESSURE: 91 MMHG

## 2019-07-08 DIAGNOSIS — R13.19 ESOPHAGEAL DYSPHAGIA: ICD-10-CM

## 2019-07-08 DIAGNOSIS — M33.13 DERMATOMYOSITIS: Primary | ICD-10-CM

## 2019-07-08 PROCEDURE — 96365 THER/PROPH/DIAG IV INF INIT: CPT

## 2019-07-08 PROCEDURE — 96366 THER/PROPH/DIAG IV INF ADDON: CPT

## 2019-07-08 PROCEDURE — 96375 TX/PRO/DX INJ NEW DRUG ADDON: CPT

## 2019-07-08 PROCEDURE — S0028 INJECTION, FAMOTIDINE, 20 MG: HCPCS | Performed by: INTERNAL MEDICINE

## 2019-07-08 PROCEDURE — 25000003 PHARM REV CODE 250: Performed by: STUDENT IN AN ORGANIZED HEALTH CARE EDUCATION/TRAINING PROGRAM

## 2019-07-08 PROCEDURE — 25000003 PHARM REV CODE 250: Performed by: INTERNAL MEDICINE

## 2019-07-08 PROCEDURE — 63600175 PHARM REV CODE 636 W HCPCS: Performed by: INTERNAL MEDICINE

## 2019-07-08 PROCEDURE — A4216 STERILE WATER/SALINE, 10 ML: HCPCS | Performed by: INTERNAL MEDICINE

## 2019-07-08 PROCEDURE — 96367 TX/PROPH/DG ADDL SEQ IV INF: CPT

## 2019-07-08 RX ORDER — SODIUM CHLORIDE 0.9 % (FLUSH) 0.9 %
10 SYRINGE (ML) INJECTION
Status: CANCELLED | OUTPATIENT
Start: 2019-07-29

## 2019-07-08 RX ORDER — FAMOTIDINE 10 MG/ML
20 INJECTION INTRAVENOUS
Status: CANCELLED | OUTPATIENT
Start: 2019-07-29

## 2019-07-08 RX ORDER — ACETAMINOPHEN 325 MG/1
650 TABLET ORAL
Status: COMPLETED | OUTPATIENT
Start: 2019-07-08 | End: 2019-07-08

## 2019-07-08 RX ORDER — HEPARIN 100 UNIT/ML
500 SYRINGE INTRAVENOUS
Status: DISCONTINUED | OUTPATIENT
Start: 2019-07-08 | End: 2019-07-08 | Stop reason: HOSPADM

## 2019-07-08 RX ORDER — ACETAMINOPHEN 325 MG/1
650 TABLET ORAL
Status: CANCELLED | OUTPATIENT
Start: 2019-07-29

## 2019-07-08 RX ORDER — HEPARIN 100 UNIT/ML
500 SYRINGE INTRAVENOUS
Status: CANCELLED | OUTPATIENT
Start: 2019-07-29

## 2019-07-08 RX ORDER — FAMOTIDINE 10 MG/ML
20 INJECTION INTRAVENOUS
Status: COMPLETED | OUTPATIENT
Start: 2019-07-08 | End: 2019-07-08

## 2019-07-08 RX ORDER — SODIUM CHLORIDE 0.9 % (FLUSH) 0.9 %
10 SYRINGE (ML) INJECTION
Status: DISCONTINUED | OUTPATIENT
Start: 2019-07-08 | End: 2019-07-08 | Stop reason: HOSPADM

## 2019-07-08 RX ADMIN — ACETAMINOPHEN 650 MG: 325 TABLET ORAL at 07:07

## 2019-07-08 RX ADMIN — SODIUM CHLORIDE: 0.9 INJECTION, SOLUTION INTRAVENOUS at 07:07

## 2019-07-08 RX ADMIN — Medication 10 ML: at 12:07

## 2019-07-08 RX ADMIN — FAMOTIDINE 20 MG: 10 INJECTION, SOLUTION INTRAVENOUS at 07:07

## 2019-07-08 RX ADMIN — HEPARIN 500 UNITS: 100 SYRINGE at 12:07

## 2019-07-08 RX ADMIN — DIPHENHYDRAMINE HYDROCHLORIDE 50 MG: 50 INJECTION, SOLUTION INTRAMUSCULAR; INTRAVENOUS at 07:07

## 2019-07-08 RX ADMIN — HUMAN IMMUNOGLOBULIN G 80 G: 40 LIQUID INTRAVENOUS at 08:07

## 2019-07-08 NOTE — PLAN OF CARE
Problem: Adult Inpatient Plan of Care  Goal: Plan of Care Review  Outcome: Ongoing (interventions implemented as appropriate)  Pt tolerated IVIG without adverse effects. VSS. Provided AVS & verbalized understanding of RTC date.  Port maintained accessed for second dose tomorrow. DC with family ambulating independently.

## 2019-07-09 ENCOUNTER — OFFICE VISIT (OUTPATIENT)
Dept: RADIATION ONCOLOGY | Facility: CLINIC | Age: 59
End: 2019-07-09
Payer: COMMERCIAL

## 2019-07-09 ENCOUNTER — INFUSION (OUTPATIENT)
Dept: INFUSION THERAPY | Facility: HOSPITAL | Age: 59
End: 2019-07-09
Attending: INTERNAL MEDICINE
Payer: COMMERCIAL

## 2019-07-09 VITALS
BODY MASS INDEX: 30.89 KG/M2 | DIASTOLIC BLOOD PRESSURE: 87 MMHG | SYSTOLIC BLOOD PRESSURE: 162 MMHG | WEIGHT: 185.38 LBS | HEIGHT: 65 IN | RESPIRATION RATE: 16 BRPM | HEART RATE: 98 BPM

## 2019-07-09 VITALS
TEMPERATURE: 98 F | HEART RATE: 88 BPM | RESPIRATION RATE: 18 BRPM | DIASTOLIC BLOOD PRESSURE: 93 MMHG | SYSTOLIC BLOOD PRESSURE: 161 MMHG

## 2019-07-09 DIAGNOSIS — Z17.1 MALIGNANT NEOPLASM OF UPPER-OUTER QUADRANT OF LEFT BREAST IN FEMALE, ESTROGEN RECEPTOR NEGATIVE: Primary | Chronic | ICD-10-CM

## 2019-07-09 DIAGNOSIS — M33.13 DERMATOMYOSITIS: Primary | ICD-10-CM

## 2019-07-09 DIAGNOSIS — C50.412 MALIGNANT NEOPLASM OF UPPER-OUTER QUADRANT OF LEFT BREAST IN FEMALE, ESTROGEN RECEPTOR NEGATIVE: Primary | Chronic | ICD-10-CM

## 2019-07-09 DIAGNOSIS — R13.19 ESOPHAGEAL DYSPHAGIA: ICD-10-CM

## 2019-07-09 PROCEDURE — 63600175 PHARM REV CODE 636 W HCPCS: Performed by: INTERNAL MEDICINE

## 2019-07-09 PROCEDURE — 96366 THER/PROPH/DIAG IV INF ADDON: CPT

## 2019-07-09 PROCEDURE — A4216 STERILE WATER/SALINE, 10 ML: HCPCS | Performed by: INTERNAL MEDICINE

## 2019-07-09 PROCEDURE — 25000003 PHARM REV CODE 250: Performed by: STUDENT IN AN ORGANIZED HEALTH CARE EDUCATION/TRAINING PROGRAM

## 2019-07-09 PROCEDURE — 99499 UNLISTED E&M SERVICE: CPT | Mod: S$GLB,,, | Performed by: RADIOLOGY

## 2019-07-09 PROCEDURE — 25000003 PHARM REV CODE 250: Performed by: INTERNAL MEDICINE

## 2019-07-09 PROCEDURE — S0028 INJECTION, FAMOTIDINE, 20 MG: HCPCS | Performed by: INTERNAL MEDICINE

## 2019-07-09 PROCEDURE — 96375 TX/PRO/DX INJ NEW DRUG ADDON: CPT

## 2019-07-09 PROCEDURE — 99999 PR PBB SHADOW E&M-EST. PATIENT-LVL III: CPT | Mod: PBBFAC,,, | Performed by: RADIOLOGY

## 2019-07-09 PROCEDURE — 99499 NO LOS: ICD-10-PCS | Mod: S$GLB,,, | Performed by: RADIOLOGY

## 2019-07-09 PROCEDURE — 96367 TX/PROPH/DG ADDL SEQ IV INF: CPT

## 2019-07-09 PROCEDURE — 96365 THER/PROPH/DIAG IV INF INIT: CPT

## 2019-07-09 PROCEDURE — 99999 PR PBB SHADOW E&M-EST. PATIENT-LVL III: ICD-10-PCS | Mod: PBBFAC,,, | Performed by: RADIOLOGY

## 2019-07-09 RX ORDER — ACETAMINOPHEN 325 MG/1
650 TABLET ORAL
Status: CANCELLED | OUTPATIENT
Start: 2019-08-05

## 2019-07-09 RX ORDER — HEPARIN 100 UNIT/ML
500 SYRINGE INTRAVENOUS
Status: DISCONTINUED | OUTPATIENT
Start: 2019-07-09 | End: 2019-07-09 | Stop reason: HOSPADM

## 2019-07-09 RX ORDER — FAMOTIDINE 10 MG/ML
20 INJECTION INTRAVENOUS
Status: CANCELLED | OUTPATIENT
Start: 2019-08-05

## 2019-07-09 RX ORDER — SODIUM CHLORIDE 0.9 % (FLUSH) 0.9 %
10 SYRINGE (ML) INJECTION
Status: CANCELLED | OUTPATIENT
Start: 2019-08-05

## 2019-07-09 RX ORDER — HEPARIN 100 UNIT/ML
500 SYRINGE INTRAVENOUS
Status: CANCELLED | OUTPATIENT
Start: 2019-08-05

## 2019-07-09 RX ORDER — ACETAMINOPHEN 325 MG/1
650 TABLET ORAL
Status: COMPLETED | OUTPATIENT
Start: 2019-07-09 | End: 2019-07-09

## 2019-07-09 RX ORDER — SODIUM CHLORIDE 0.9 % (FLUSH) 0.9 %
10 SYRINGE (ML) INJECTION
Status: DISCONTINUED | OUTPATIENT
Start: 2019-07-09 | End: 2019-07-09 | Stop reason: HOSPADM

## 2019-07-09 RX ORDER — FAMOTIDINE 10 MG/ML
20 INJECTION INTRAVENOUS
Status: COMPLETED | OUTPATIENT
Start: 2019-07-09 | End: 2019-07-09

## 2019-07-09 RX ADMIN — FAMOTIDINE 20 MG: 10 INJECTION, SOLUTION INTRAVENOUS at 07:07

## 2019-07-09 RX ADMIN — Medication 10 ML: at 12:07

## 2019-07-09 RX ADMIN — HUMAN IMMUNOGLOBULIN G 80 G: 40 LIQUID INTRAVENOUS at 08:07

## 2019-07-09 RX ADMIN — HEPARIN 500 UNITS: 100 SYRINGE at 12:07

## 2019-07-09 RX ADMIN — SODIUM CHLORIDE: 0.9 INJECTION, SOLUTION INTRAVENOUS at 07:07

## 2019-07-09 RX ADMIN — ACETAMINOPHEN 650 MG: 325 TABLET ORAL at 07:07

## 2019-07-09 RX ADMIN — DIPHENHYDRAMINE HYDROCHLORIDE 50 MG: 50 INJECTION, SOLUTION INTRAMUSCULAR; INTRAVENOUS at 07:07

## 2019-07-09 NOTE — PROGRESS NOTES
Subjective:       Patient ID: Ermelinda Verde is a 58 y.o. female.    Chief Complaint: Breast Cancer (f/u after xrt)    This patient presents for initial follow up visit.     Ms. Verde's history dates back to January of 2017 when she presented for evaluation of a palpable mass in the UOQ of the Lt. breast. Biopsy on 1/24/17 revealed infiltrating ductal carcinoma, histologic grade 3, nuclear grade 3 and mitotic index 3. The tumor was ER, AR and Her-2 negative. The patient presented to the [facility] for further evaluation. Ultrasound revealed the mass measured 2.5 cm. There was no axillary adenopathy. She was recommended for neoadjuvant chemotherapy. She did undergo sentinel node biopsy at the time of port placement. Four sentinel nodes were negative for tumor involvement. She received neoadjuvant chemotherapy with 4 cycles of docetaxel and Cytoxan. On 6/26/17 the patient underwent Lt. mastectomy with SCOTT flap reconstruction. Pathology from the Lt. breast revealed 2 foci of invasive ductal carcinoma, 14 mm and 1.5 mm. Both histologic grade 3, nuclear grade 3 and mitotic index 2. There was no associated DCIS. The margins of resection were negative. The closest margin was superior at 5 mm. Postoperatively, the patient received adjuvant single agent Adriamycin for 4 cycles which she completed in September of 2017. She did well until October of 2018 when she presented with a 2.5 cm lt. supraclavicular lymph node and a more posterior 1 cm supraclavicular node. Ultrasound confirmed the findings along with a level 2 subpectoral node. FNA was positive for adenocarcinoma consistent with breast primary. The tumor was ER, AR and Her-2 negative. Ki-67 was 10%. Further work up with PET scan on 10/11/18 revealed multiple enlarged left-sided hypermetabolic nodes involving the left lower cervical/supraclavicular chain, infraclavicular region, and retropectoral region, SUV max 27.2. There was no evidence of distant  "metastatic disease. The patient began chemo/immunotherapy with abraxane and atelizumab for 3 cycles. She was admitted for 22 days with dermatomyositis requiring high dose steroids and MTX. She was discharged on 2/13/19. Of note repeat PET scan on 12/27/18 revealed significant improvement in all the previously seen lymph nodes involving the Lt. lower neck, supraclavicular region and axilla. CT of the neck and chest on 1/15/19 confirmed the findings with numerous upper limit normal sized nodes. The patient was referred for consolidative radiotherapy. She has completed 50.4 Gy to the Lt. breast, regional lymphatics and Lt. cervical  region on 5/10/19.  The patient continues with IVIG therapy for her dermatomyositis.  Today, the patient states she feels well.  No complaints.  Notes the Lt. breast mound feels "thick".    Review of Systems   Constitutional: Negative for activity change, appetite change, chills, fatigue and fever.   Respiratory: Negative for cough and shortness of breath.    Cardiovascular: Negative for chest pain and palpitations.   Gastrointestinal: Negative for abdominal pain, nausea and vomiting.   Musculoskeletal: Negative for back pain, gait problem and joint swelling.       Objective:      Physical Exam   Constitutional: She appears well-developed and well-nourished. No distress.   Neck: Normal range of motion. Neck supple.   Pulmonary/Chest: Left breast exhibits skin change (thickened skin secondary to mild edema following radiotherapy. ). Left breast exhibits no inverted nipple, no mass, no nipple discharge and no tenderness.   Lymphadenopathy:     She has no cervical adenopathy.     She has no axillary adenopathy.        Right: No supraclavicular adenopathy present.        Left: No supraclavicular (thickened tissue in the Lt. supraclavicular fossa without discrete masses ) adenopathy present.       PET scan on 6/21/19 revealed No FDG PET/CT findings to suggest recurrent or metastatic " disease.  Assessment:       1. Malignant neoplasm of upper-outer quadrant of left breast in female, estrogen receptor negative        Plan:       Doing well, recovering well from her radiotherapy.  No evidence of progressive disease.  The patient will continue follow up with medical oncology.  Plan follow up with us PRN.

## 2019-07-09 NOTE — PLAN OF CARE
Problem: Adult Inpatient Plan of Care  Goal: Plan of Care Review  Outcome: Ongoing (interventions implemented as appropriate)  Pt tolerated D2 IVIG without adverse effects. VSS. Provided AVS & verbalized understanding of RTC date. DC with family ambulating independently.

## 2019-07-11 ENCOUNTER — PATIENT MESSAGE (OUTPATIENT)
Dept: HEMATOLOGY/ONCOLOGY | Facility: CLINIC | Age: 59
End: 2019-07-11

## 2019-07-11 ENCOUNTER — OFFICE VISIT (OUTPATIENT)
Dept: HEPATOLOGY | Facility: CLINIC | Age: 59
End: 2019-07-11
Payer: COMMERCIAL

## 2019-07-11 VITALS
WEIGHT: 180.75 LBS | RESPIRATION RATE: 18 BRPM | HEIGHT: 65 IN | HEART RATE: 101 BPM | DIASTOLIC BLOOD PRESSURE: 95 MMHG | BODY MASS INDEX: 30.12 KG/M2 | OXYGEN SATURATION: 100 % | SYSTOLIC BLOOD PRESSURE: 186 MMHG

## 2019-07-11 DIAGNOSIS — D69.6 THROMBOCYTOPENIA: ICD-10-CM

## 2019-07-11 DIAGNOSIS — R74.8 ELEVATED LIVER ENZYMES: ICD-10-CM

## 2019-07-11 DIAGNOSIS — C50.919 MALIGNANT NEOPLASM OF FEMALE BREAST, UNSPECIFIED ESTROGEN RECEPTOR STATUS, UNSPECIFIED LATERALITY, UNSPECIFIED SITE OF BREAST: ICD-10-CM

## 2019-07-11 DIAGNOSIS — K76.89 HEPATIC CYST: Primary | ICD-10-CM

## 2019-07-11 DIAGNOSIS — M33.13 DERMATOMYOSITIS: ICD-10-CM

## 2019-07-11 PROCEDURE — 3077F PR MOST RECENT SYSTOLIC BLOOD PRESSURE >= 140 MM HG: ICD-10-PCS | Mod: CPTII,S$GLB,, | Performed by: NURSE PRACTITIONER

## 2019-07-11 PROCEDURE — 3008F PR BODY MASS INDEX (BMI) DOCUMENTED: ICD-10-PCS | Mod: CPTII,S$GLB,, | Performed by: NURSE PRACTITIONER

## 2019-07-11 PROCEDURE — 99999 PR PBB SHADOW E&M-EST. PATIENT-LVL IV: CPT | Mod: PBBFAC,,, | Performed by: NURSE PRACTITIONER

## 2019-07-11 PROCEDURE — 99244 OFF/OP CNSLTJ NEW/EST MOD 40: CPT | Mod: S$GLB,,, | Performed by: NURSE PRACTITIONER

## 2019-07-11 PROCEDURE — 99244 PR OFFICE CONSULTATION,LEVEL IV: ICD-10-PCS | Mod: S$GLB,,, | Performed by: NURSE PRACTITIONER

## 2019-07-11 PROCEDURE — 3077F SYST BP >= 140 MM HG: CPT | Mod: CPTII,S$GLB,, | Performed by: NURSE PRACTITIONER

## 2019-07-11 PROCEDURE — 99999 PR PBB SHADOW E&M-EST. PATIENT-LVL IV: ICD-10-PCS | Mod: PBBFAC,,, | Performed by: NURSE PRACTITIONER

## 2019-07-11 PROCEDURE — 3008F BODY MASS INDEX DOCD: CPT | Mod: CPTII,S$GLB,, | Performed by: NURSE PRACTITIONER

## 2019-07-11 PROCEDURE — 3080F DIAST BP >= 90 MM HG: CPT | Mod: CPTII,S$GLB,, | Performed by: NURSE PRACTITIONER

## 2019-07-11 PROCEDURE — 3080F PR MOST RECENT DIASTOLIC BLOOD PRESSURE >= 90 MM HG: ICD-10-PCS | Mod: CPTII,S$GLB,, | Performed by: NURSE PRACTITIONER

## 2019-07-11 NOTE — LETTER
July 12, 2019      Aidan Knight MD  [address]  [place] [address]           [facility] - Hepatology  [address]  [place] [address]  Phone: [phone]  Fax: [phone]          Patient: Ermelinda Verde   MR Number: [id number]   YOB: 1960   Date of Visit: 7/11/2019       Dear Dr. Aidan Knight:    Thank you for referring Ermelinda Verde to me for evaluation. Attached you will find relevant portions of my assessment and plan of care.    If you have questions, please do not hesitate to call me. I look forward to following Ermelinda Verde along with you.    Sincerely,    April Cody, NEGIN    Enclosure  CC:  No Recipients    If you would like to receive this communication electronically, please contact [email or web address] or [phone] to request more information on [employer] Link access.    For providers and/or their staff who would like to refer a patient to [facility], please contact us through our one-stop-shop provider referral line, [facility], at [phone].    If you feel you have received this communication in error or would no longer like to receive these types of communications, please e-mail [email or web address]

## 2019-07-11 NOTE — PROGRESS NOTES
[facility] - New Patient     REFERRING PROVIDER: Dr. Aidan Knight    CHIEF COMPLAINT: Hepatic cysts, thrombocytopenia, elevated liver enzymes     HPI: This is a 58 y.o. Black or  female referred for evaluation of hepatic cysts, elevated liver enzymes, and thrombocytopenia.     She is here today with spouse.    Hepatic cysts noted on imaging since 2017:  Abd US (7/24/17): multiple hepatic cysts identified, the largest in the left hepatic lobe measures 1.0 x 1.1 x 0.8 cm while the largest on the right measures 1.1 x 1.0 x 1.2 cm and appears bilobed    Abd US (6/27/19): liver normal size (14.5 cm), multiple simple to mildly complex cysts with septations measuring 1-3mm (largest measuring 2.1cm), spleen normal size (10 cm)    She was diagnosed with breast cancer in January 2017.   Per Oncology notes: completed first 4 cycles of chemotherapy 5/9/17. Underwent L mastectomy 6/26/17; this revealed additional areas of invasive high-grade carcinoma. Subsequently completed 4 additional cycles of adjuvant chemo 9/12/17. Found to develop recurrence (L supraclavicular lymphadenopathy) 10/2018. She then received 3 cycles of Nab paclitaxel and atezolizumab; last treatment 1/3/19.     Her treatment was complicated by the development of dermatomyositis which resulted in the lengthy hospitalization from 1/22-2/13. She has been treated with steroids, IVIG, and low-dose methotrexate. Currently on prednisone taper and last IVIG dose scheduled this month.      Review of labs show elevated transaminases starting 12/2018, previously normal. Platelets low starting at this time as well, previously normal.  Liver enzymes have since trended down to normal.   Synthetic liver function normal.     Serologic workup:  -Positive DARCY 1:160  -Negative AMA  -Smooth muscle Ab positive 1:40  -IgG elevated 2627 (normal 1/2019)  -Ceruloplasmin normal  -A1AT phenotype MM, level normal  -Ferritin normal, low iron  sat  -Negative for hepatitis B and C    EGD (19): no evidence of portal hypertension    She feels well overall. Denies symptoms of hepatic decompensation including jaundice, ascites, cognitive problems to suggest hepatic encephalopathy, or GI bleeding.       Review of patient's allergies indicates:  No Known Allergies     Current Outpatient Medications on File Prior to Visit   Medication Sig Dispense Refill    acetaminophen (TYLENOL) 325 MG tablet 650 mg by Tube route every 4 (four) hours as needed for Pain.      ferrous sulfate (FEOSOL) 325 mg (65 mg iron) Tab tablet Take 1 tablet (325 mg total) by mouth every 12 (twelve) hours. 60 tablet 4    folic acid (FOLVITE) 1 MG tablet TAKE 4 TABLETS(4 MG) VIA GTUBE EVERY  tablet 2    omeprazole (PRILOSEC) 40 MG capsule Take 1 capsule (40 mg total) by mouth 2 (two) times daily before meals. 60 capsule 11    predniSONE (DELTASONE) 1 MG tablet Take 4 tablets (4 mg total) by mouth once daily. 9mg daily with 1mg taper every 2 weeks 120 tablet 0    predniSONE (DELTASONE) 5 MG tablet Take 1 tablet (5 mg total) by mouth once daily. 30 tablet 2    senna-docusate 8.6-50 mg (PERICOLACE) 8.6-50 mg per tablet 2 tablets by Tube route daily with lunch.       No current facility-administered medications on file prior to visit.          PMHX:  has a past medical history of Breast cancer (2017), Depression, Dermatomyositis, Diverticulosis, Gastritis, Hypertension, and Vitamin B12 deficiency (3/8/2018).    PSHX:  has a past surgical history that includes D&C;  section; Portacath placement; [place] lymph node biopsy (2017); Breast biopsy (Left); Myomectomy; Colonoscopy (2011); Breast reconstruction (Left, ); Total Reduction Mammoplasty (Right, ); Insertion of tunneled central venous catheter (CVC) with subcutaneous port (Right, 10/31/2018); Mastectomy (Left, 2017); Esophagogastroduodenoscopy (N/A, 2019); and Upper gastrointestinal  endoscopy.    FAMILY HISTORY:   Family History   Problem Relation Age of Onset    Heart disease Father     Hypertension Father     Breast cancer Sister 52    Hypertension Mother     No Known Problems Brother     Thyroid disease Daughter         hyperthyroidism s/p thyroidectomy    No Known Problems Son     No Known Problems Brother     No Known Problems Brother     No Known Problems Sister     No Known Problems Daughter     Colon cancer Neg Hx     Ovarian cancer Neg Hx     Celiac disease Neg Hx     Cirrhosis Neg Hx     Colon polyps Neg Hx     Esophageal cancer Neg Hx     Inflammatory bowel disease Neg Hx     Liver cancer Neg Hx     Liver disease Neg Hx     Rectal cancer Neg Hx     Stomach cancer Neg Hx     Ulcerative colitis Neg Hx        SOCIAL HISTORY:   Social History     Tobacco Use   Smoking Status Never Smoker   Smokeless Tobacco Never Used       Social History     Substance and Sexual Activity   Alcohol Use Yes    Frequency: Never    Drinks per session: Patient refused    Binge frequency: Never    Comment: rare       Social History     Substance and Sexual Activity   Drug Use No         Review of Systems   Constitutional: Negative for appetite change, chills, fatigue, fever and unexpected weight change.   Eyes: Negative for visual disturbance.   Respiratory: Negative for cough and shortness of breath.    Cardiovascular: Negative for chest pain, palpitations and leg swelling.   Gastrointestinal: Negative for abdominal distention, abdominal pain, blood in stool, constipation, diarrhea, nausea and vomiting. No change in stool color.  Genitourinary: Negative for dysuria and hematuria. Denies dark urine.   Musculoskeletal: Negative for arthralgias, gait problem, joint swelling and myalgias.   Skin: Negative for color change, rash and wound. Denies itching.   Neurological: Negative for dizziness, tremors, speech difficulty, and headaches.   Hematological: Does not bruise/bleed easily.  "  Psychiatric/Behavioral: Negative for confusion, decreased concentration and sleep disturbance. Denies memory loss. Denies depression. The patient is not nervous/anxious.        Physical Exam   Constitutional: Well-nourished. No distress. Alert and oriented to person, place, and time.  Eyes: No scleral icterus.   Cardiovascular: Normal rate, regular rhythm and normal heart sounds. No murmur heard.  Pulmonary/Chest: Respiratory effort and breath sounds normal. No respiratory distress.   Abdominal: Soft, non-tender. No distension; no ascites appreciated. There is no palpable hepatosplenomegaly. No hernia or mass.   Musculoskeletal: No edema.   Neurological: No tremor. Coordination and gait normal. No asterixis.    Skin: Skin is warm and dry. No rash or erythema. No jaundice. No telangiectasias or palmar erythema noted.  Psychiatric: Normal mood and affect. Speech, behavior, and thought content normal. No depression or anxiety noted.       BP (!) 186/95 (BP Location: Right arm, Patient Position: Sitting, BP Method: Medium (Automatic))   Pulse 101   Resp 18   Ht 5' 5" (1.651 m)   Wt 82 kg (180 lb 12.4 oz)   LMP  (LMP Unknown)   SpO2 100%   BMI 30.08 kg/m²         LABS:  Lab Results   Component Value Date    WBC 2.38 (L) 06/25/2019    HGB 10.9 (L) 06/25/2019    HCT 36.4 (L) 06/25/2019     (L) 06/25/2019     06/25/2019    K 3.9 06/25/2019    CREATININE 0.8 06/25/2019    ALT 14 06/27/2019    AST 31 06/27/2019    ALKPHOS 70 06/27/2019    BILITOT 0.3 06/27/2019    BILIDIR 0.1 06/27/2019    ALBUMIN 3.2 (L) 06/27/2019    INR 1.0 06/27/2019    SMOOTHMUSCAB Positive 1:40 (A) 06/27/2019    AMAIFA Negative 1:40 06/27/2019    IGGSERUM 2627 (H) 06/27/2019    ANASCREEN Positive (A) 06/27/2019    FERRITIN 80 06/27/2019    FESATURATED 14 (L) 06/27/2019    NPKAV3YFXESZ MM 06/27/2019    OFTRC5NKXUET 129 06/27/2019    CERULOPLSM 36.0 06/27/2019    CHOL 199 04/04/2019    TRIG 132 04/04/2019    LDLCALC 108.6 04/04/2019 "    HGBA1C 6.3 (H) 01/22/2019       Hepatitis A Antibody IgG   Date Value Ref Range Status   06/27/2019 Positive (A)  Final       Hepatitis B Surface Ag   Date Value Ref Range Status   01/28/2019 Negative  Final     Hep B Core Total Ab   Date Value Ref Range Status   01/28/2019 Negative  Final     Hep B S Ab   Date Value Ref Range Status   01/28/2019 Negative  Final     Hepatitis C Ab   Date Value Ref Range Status   01/28/2019 Negative  Final     Immunization History   Administered Date(s) Administered    Influenza - Intranasal - Quadrivalent 10/24/2018    Influenza - Quadrivalent - PF 10/24/2018    Pneumococcal Conjugate - 13 Valent 06/18/2019    Tdap 03/08/2018          DIAGNOSTIC STUDIES:  ABD. U/S   Results for orders placed during the hospital encounter of 07/24/17   US Abdomen Limited    Narrative Time of Procedure: 07/24/17 13:20:00  Accession # [id number]    Reason for study: Hepatic cysts    Comparison: CTA abdomen and pelvis on 6/8/2017    Technique: Limited right upper quadrant ultrasound was performed.    Findings: The liver is normal in size measuring 14.5cm.  Hepatic parenchyma is homogeneous in echogenicity.  HRI measures 1.1, suggestive of less than 5% steatosis.  Multiple hepatic cysts identified, the largest in the left hepatic lobe measures 1.0 x 1.1 x 0.8 cm while the largest on the right measures 1.1 x 1.0 x 1.2 cm and appears bilobed.  No intra- or extrahepatic biliary ductal dilatation. The common bile duct measures 0.4 cm.  The gallbladder appears normal. No evidence for cholelithiasis.  Sonographic Mckee's sign is negative. The visualized portions of the pancreas appear normal. The spleen is normal and measures 8.8 x 3.2 cm. No ascites.    Impression Hepatic cysts with no suspicious hepatic lesions.    ______________________________________     Electronically signed by resident: CAROLINE FORD MD  Date:     07/24/17  Time:    13:52            As the supervising and teaching physician, I  personally reviewed the images and resident's interpretation and I agree with the findings.          Electronically signed by: YUSRA LEVI MD  Date:     07/24/17  Time:    13:55        Abd US - 6/27/19  FINDINGS:  The liver measures 14.5 cm and demonstrates multiple simple to mildly complex cysts, which contain septations measuring 1-3 mm, similar to prior exam.  The largest within the left hepatic lobe measures up to 1.1 cm in the largest within the right hepatic lobe contains a thin septation measuring up to 2.1 cm.  No intra or extrahepatic bile duct dilatation. The common duct measures 3 mm.    The middle, right, and left hepatic veins are patent and unremarkable. The main, right, and left branches of the portal vein demonstrate hepatopedal flow and are unremarkable.    The pancreas and visualized aorta are unremarkable. The gallbladder contains a few tiny floating echogenic foci, favored represent biliary crystals.  The spleen measures 10.0 x 3.2 cm and is unremarkable.    The hepatic arterial system is unremarkable. The celiac artery is unremarkable.    The IVC, SMV, and splenic vein are patent and demonstrate proper directional flow.  Left kidney demonstrates an extra renal pelvis versus pelviectasis.  Right kidney is unremarkable.  There is no ascites.      Impression       1. Multiple simple to mildly complex hepatic cysts, similar to prior exam.  2. Gallbladder contains a few tiny biliary crystals.  3. Left kidney demonstrates an extrarenal pelvis versus mild pelvic fullness.  4. Satisfactory Doppler evaluation of the liver vasculature.            ASSESSMENT / PLAN:  58 y.o. Black or  female with:    1. Hepatic cysts  -- Reassurance provided that cysts are common, should not cause problems in the liver, and typically do not require any treatment   -- Will repeat ultrasound in 1 year to monitor for stability     2. Elevated liver enzymes, thrombocytopenia - Appear to be associated with  chemotherapy / dermatomyositis. Transaminases have normalized with completion of chemo and IVIG/steroids.   -- No evidence of portal hypertension or advanced liver disease on imaging / EGD  -- Will continue to monitor liver enzymes, next labs scheduled 8/27  -- Obtain Fibroscan for fibrosis staging (patient to return for this next month as she just ate)    3. History of breast cancer  -- Last PET-CT showed complete response per Oncology    4. Dermatomyositis  -- Likely cause of positive autoimmune markers; liver enzymes have trended down at this time       Orders Placed This Encounter   Procedures    US Elastography Liver    US Abdomen Complete    Hepatic function panel         Return to clinic pending above results.         Thank you for allowing me to participate in the care of MATTHEW Zepeda  Hepatology and Liver Transplant         Patient was seen in clinic with Dr. Peng ; Case discussed, plan reviewed.

## 2019-07-11 NOTE — PROGRESS NOTES
I have personally performed a face to face diagnostic evaluation on this patient. I have reviewed and agree with today's findings and the care plan outlined by April Cody NP with following comments:    Ms. Verde is a very pleasant 58 year old lady who recently underwent 3 cycles of chemotherapy nab-paclitaxel and Atezolizumab for breast cancer. She is in remission for breast cancer. Her liver enzymes are elevated during chemo, now all liver tests have been normalized. She does have borderline thrombocytopenia. She was on low dose MTX for dermatomyositis. She does not have obvious risk factors for NAFLD/LUNDBERG. She has benign liver cysts.    Agree with checking VCTE for fibrosis assessment and repeating liver US in 1 year to monitor the stability of liver cysts.     The patient will return to April Cody NP  for follow-up.     Tricia Peng MD   Hepatology  [facility]

## 2019-07-12 PROBLEM — K76.89 HEPATIC CYST: Status: ACTIVE | Noted: 2019-07-12

## 2019-07-16 ENCOUNTER — PATIENT MESSAGE (OUTPATIENT)
Dept: RHEUMATOLOGY | Facility: CLINIC | Age: 59
End: 2019-07-16

## 2019-07-22 ENCOUNTER — PATIENT MESSAGE (OUTPATIENT)
Dept: RHEUMATOLOGY | Facility: CLINIC | Age: 59
End: 2019-07-22

## 2019-07-22 ENCOUNTER — TELEPHONE (OUTPATIENT)
Dept: RHEUMATOLOGY | Facility: CLINIC | Age: 59
End: 2019-07-22

## 2019-07-22 DIAGNOSIS — M35.9 POLYMYOSITIS ASSOCIATED WITH AUTOIMMUNE DISEASE: ICD-10-CM

## 2019-07-22 DIAGNOSIS — M33.20 POLYMYOSITIS ASSOCIATED WITH AUTOIMMUNE DISEASE: ICD-10-CM

## 2019-07-22 DIAGNOSIS — M33.13 DERMATOMYOSITIS: Primary | ICD-10-CM

## 2019-07-22 DIAGNOSIS — Z79.899 ENCOUNTER FOR LONG-TERM CURRENT USE OF HIGH RISK MEDICATION: ICD-10-CM

## 2019-07-22 RX ORDER — PREDNISONE 10 MG/1
10 TABLET ORAL DAILY
Qty: 30 TABLET | Refills: 2 | Status: SHIPPED | OUTPATIENT
Start: 2019-07-22 | End: 2019-08-02

## 2019-07-22 NOTE — TELEPHONE ENCOUNTER
Received message that patient is having muscle pain and heaviness since decreasing prednisone.  Patient is currently at prednisone 8mg at this time (decreased it 2 weeks ago around July 14).  Patient received IVIG on July 8/9.      Patient was asymptomatic at prednisone 10mg.  Patient told to go back to prednisone 10mg Qday and educated to update me on her symptoms.

## 2019-07-29 ENCOUNTER — HOSPITAL ENCOUNTER (OUTPATIENT)
Facility: HOSPITAL | Age: 59
Discharge: HOME | End: 2019-07-29
Attending: INTERNAL MEDICINE | Admitting: INTERNAL MEDICINE
Payer: COMMERCIAL

## 2019-07-29 ENCOUNTER — ANESTHESIA (OUTPATIENT)
Dept: ENDOSCOPY | Facility: HOSPITAL | Age: 59
End: 2019-07-29
Payer: COMMERCIAL

## 2019-07-29 ENCOUNTER — ANESTHESIA EVENT (OUTPATIENT)
Dept: ENDOSCOPY | Facility: HOSPITAL | Age: 59
End: 2019-07-29
Payer: COMMERCIAL

## 2019-07-29 VITALS
DIASTOLIC BLOOD PRESSURE: 86 MMHG | HEART RATE: 85 BPM | BODY MASS INDEX: 29.16 KG/M2 | WEIGHT: 175 LBS | OXYGEN SATURATION: 100 % | TEMPERATURE: 98 F | HEIGHT: 65 IN | SYSTOLIC BLOOD PRESSURE: 151 MMHG | RESPIRATION RATE: 20 BRPM

## 2019-07-29 DIAGNOSIS — R13.12 OROPHARYNGEAL DYSPHAGIA: Primary | ICD-10-CM

## 2019-07-29 DIAGNOSIS — Z12.11 COLON CANCER SCREENING: ICD-10-CM

## 2019-07-29 PROCEDURE — 37000009 HC ANESTHESIA EA ADD 15 MINS: Performed by: INTERNAL MEDICINE

## 2019-07-29 PROCEDURE — 43247 PR EGD, FLEX, W/REMOVAL, FOREIGN BODY: ICD-10-PCS | Mod: 51,,, | Performed by: INTERNAL MEDICINE

## 2019-07-29 PROCEDURE — 43247 EGD REMOVE FOREIGN BODY: CPT | Performed by: INTERNAL MEDICINE

## 2019-07-29 PROCEDURE — 63600175 PHARM REV CODE 636 W HCPCS: Performed by: INTERNAL MEDICINE

## 2019-07-29 PROCEDURE — G0121 COLON CA SCRN NOT HI RSK IND: ICD-10-PCS | Mod: ,,, | Performed by: INTERNAL MEDICINE

## 2019-07-29 PROCEDURE — 43247 EGD REMOVE FOREIGN BODY: CPT | Mod: 51,,, | Performed by: INTERNAL MEDICINE

## 2019-07-29 PROCEDURE — G0121 COLON CA SCRN NOT HI RSK IND: HCPCS | Mod: ,,, | Performed by: INTERNAL MEDICINE

## 2019-07-29 PROCEDURE — 25000003 PHARM REV CODE 250: Performed by: NURSE ANESTHETIST, CERTIFIED REGISTERED

## 2019-07-29 PROCEDURE — E9220 PRA ENDO ANESTHESIA: HCPCS | Mod: 33,,, | Performed by: NURSE ANESTHETIST, CERTIFIED REGISTERED

## 2019-07-29 PROCEDURE — G0121 COLON CA SCRN NOT HI RSK IND: HCPCS | Performed by: INTERNAL MEDICINE

## 2019-07-29 PROCEDURE — 37000008 HC ANESTHESIA 1ST 15 MINUTES: Performed by: INTERNAL MEDICINE

## 2019-07-29 PROCEDURE — 63600175 PHARM REV CODE 636 W HCPCS: Performed by: ANESTHESIOLOGY

## 2019-07-29 PROCEDURE — 63600175 PHARM REV CODE 636 W HCPCS: Performed by: NURSE ANESTHETIST, CERTIFIED REGISTERED

## 2019-07-29 PROCEDURE — E9220 PRA ENDO ANESTHESIA: ICD-10-PCS | Mod: 33,,, | Performed by: NURSE ANESTHETIST, CERTIFIED REGISTERED

## 2019-07-29 RX ORDER — HEPARIN SODIUM,PORCINE/PF 10 UNIT/ML
10 SYRINGE (ML) INTRAVENOUS ONCE
Status: DISCONTINUED | OUTPATIENT
Start: 2019-07-29 | End: 2019-07-29

## 2019-07-29 RX ORDER — SODIUM CHLORIDE 0.9 % (FLUSH) 0.9 %
3 SYRINGE (ML) INJECTION EVERY 8 HOURS PRN
Status: CANCELLED | OUTPATIENT
Start: 2019-07-29

## 2019-07-29 RX ORDER — SODIUM CHLORIDE 9 MG/ML
INJECTION, SOLUTION INTRAVENOUS CONTINUOUS
Status: DISCONTINUED | OUTPATIENT
Start: 2019-07-29 | End: 2019-07-29 | Stop reason: HOSPADM

## 2019-07-29 RX ORDER — PROPOFOL 10 MG/ML
VIAL (ML) INTRAVENOUS CONTINUOUS PRN
Status: DISCONTINUED | OUTPATIENT
Start: 2019-07-29 | End: 2019-07-29

## 2019-07-29 RX ORDER — LIDOCAINE HCL/PF 100 MG/5ML
SYRINGE (ML) INTRAVENOUS
Status: DISCONTINUED | OUTPATIENT
Start: 2019-07-29 | End: 2019-07-29

## 2019-07-29 RX ORDER — ONDANSETRON 2 MG/ML
4 INJECTION INTRAMUSCULAR; INTRAVENOUS DAILY PRN
Status: CANCELLED | OUTPATIENT
Start: 2019-07-29

## 2019-07-29 RX ORDER — HEPARIN 100 UNIT/ML
5 SYRINGE INTRAVENOUS ONCE
Status: COMPLETED | OUTPATIENT
Start: 2019-07-29 | End: 2019-07-29

## 2019-07-29 RX ORDER — PROPOFOL 10 MG/ML
VIAL (ML) INTRAVENOUS
Status: DISCONTINUED | OUTPATIENT
Start: 2019-07-29 | End: 2019-07-29

## 2019-07-29 RX ORDER — GLYCOPYRROLATE 0.2 MG/ML
INJECTION INTRAMUSCULAR; INTRAVENOUS
Status: DISCONTINUED | OUTPATIENT
Start: 2019-07-29 | End: 2019-07-29

## 2019-07-29 RX ADMIN — GLYCOPYRROLATE 0.2 MG: 0.2 INJECTION, SOLUTION INTRAMUSCULAR; INTRAVENOUS at 01:07

## 2019-07-29 RX ADMIN — PROPOFOL 150 MCG/KG/MIN: 10 INJECTION, EMULSION INTRAVENOUS at 02:07

## 2019-07-29 RX ADMIN — HEPARIN 500 UNITS: 100 SYRINGE at 03:07

## 2019-07-29 RX ADMIN — PROPOFOL 80 MG: 10 INJECTION, EMULSION INTRAVENOUS at 02:07

## 2019-07-29 RX ADMIN — SODIUM CHLORIDE: 0.9 INJECTION, SOLUTION INTRAVENOUS at 01:07

## 2019-07-29 RX ADMIN — LIDOCAINE HYDROCHLORIDE 80 MG: 20 INJECTION, SOLUTION INTRAVENOUS at 02:07

## 2019-07-29 NOTE — H&P
Short Stay Endoscopy History and Physical    PCP - Reta Weeks MD  Referring Physician - Reta Weeks MD  [address] [place] [address]    Procedure - EGD and Colonoscopy  ASA - per anesthesia  Mallampati - per anesthesia  History of Anesthesia problems - no  Family history Anesthesia problems -  no   Plan of anesthesia - General    HPI  58 y.o. female w/ hx of [place] grade C esophagitis, has been on double dose PPI, has PEG tube in place and would like it removed today. Her last colonoscopy was ~10 years ago.     Reason for procedure: follow up Grade C esophagitis, PEG tube removal, screening colonoscopy.     ROS:  Constitutional: No fevers, chills, No weight loss  CV: No chest pain  Pulm: No cough, No shortness of breath  GI: see HPI    Medical History:  has a past medical history of Breast cancer (2017), Depression, Dermatomyositis, Diverticulosis, Gastritis, Hypertension, and Vitamin B12 deficiency (3/8/2018).    Surgical History:  has a past surgical history that includes D&C;  section; Portacath placement; [place] lymph node biopsy (2017); Breast biopsy (Left); Myomectomy; Colonoscopy (2011); Breast reconstruction (Left, 2017); Total Reduction Mammoplasty (Right, ); Insertion of tunneled central venous catheter (CVC) with subcutaneous port (Right, 10/31/2018); Mastectomy (Left, 2017); Esophagogastroduodenoscopy (N/A, 2019); and Upper gastrointestinal endoscopy.    Family History: family history includes Breast cancer (age of onset: 52) in her sister; Heart disease in her father; Hypertension in her father and mother; No Known Problems in her brother, brother, brother, daughter, sister, and son; Thyroid disease in her daughter., see HPI    Social History:  reports that she has never smoked. She has never used smokeless tobacco. She reports that she drinks alcohol. She reports that she does not use drugs.    Review of patient's allergies indicates:  No Known  Allergies    Medications:   Medications Prior to Admission   Medication Sig Dispense Refill Last Dose    acetaminophen (TYLENOL) 325 MG tablet 650 mg by Tube route every 4 (four) hours as needed for Pain.   Taking    ferrous sulfate (FEOSOL) 325 mg (65 mg iron) Tab tablet Take 1 tablet (325 mg total) by mouth every 12 (twelve) hours. 60 tablet 4 Taking    folic acid (FOLVITE) 1 MG tablet TAKE 4 TABLETS(4 MG) VIA GTUBE EVERY  tablet 2 Taking    omeprazole (PRILOSEC) 40 MG capsule Take 1 capsule (40 mg total) by mouth 2 (two) times daily before meals. 60 capsule 11 Taking    predniSONE (DELTASONE) 10 MG tablet Take 1 tablet (10 mg total) by mouth once daily. 30 tablet 2     senna-docusate 8.6-50 mg (PERICOLACE) 8.6-50 mg per tablet 2 tablets by Tube route daily with lunch.   Taking       Physical Exam:    Vital Signs: There were no vitals filed for this visit.    General Appearance: Well appearing in no acute distress  Abdomen: Soft, non tender, non distended with normal bowel sounds, no masses    Labs:  Lab Results   Component Value Date    WBC 2.38 (L) 06/25/2019    HGB 10.9 (L) 06/25/2019    HCT 36.4 (L) 06/25/2019     (L) 06/25/2019    CHOL 199 04/04/2019    TRIG 132 04/04/2019    HDL 64 04/04/2019    ALT 14 06/27/2019    AST 31 06/27/2019     06/25/2019    K 3.9 06/25/2019     06/25/2019    CREATININE 0.8 06/25/2019    BUN 13 06/25/2019    CO2 28 06/25/2019    TSH 3.586 01/22/2019    INR 1.0 06/27/2019    HGBA1C 6.3 (H) 01/22/2019       I have explained the risks and benefits of this endoscopic procedure to the patient including but not limited to bleeding, inflammation, infection, perforation, and death.      Alejo Pérez MD

## 2019-07-29 NOTE — ANESTHESIA PREPROCEDURE EVALUATION
2019  Ermelinda Verde is a 58 y.o., female.  Pre-operative evaluation for Procedure(s) (LRB):  EGD (ESOPHAGOGASTRODUODENOSCOPY) (N/A)  COLONOSCOPY (N/A)    Ermelinda Verde is a 58 y.o. female     Patient Active Problem List   Diagnosis    Breast cancer of upper-outer quadrant of left female breast    Encounter for antineoplastic chemotherapy    Adjustment disorder with depressed mood    Pre-diabetes    Regional lymph node metastasis present    Breast cancer in female    Dysphagia    Dermatomyositis    Polymyositis associated with autoimmune disease    Congestion of upper airway    Moderate malnutrition    Impaired functional mobility and endurance    Anemia    Immunosuppression due to drug therapy    Erosive esophagitis    Gastro-esophageal reflux disease with esophagitis    Chemotherapy-induced neuropathy    Hepatic cyst    Colon cancer screening       Review of patient's allergies indicates:  No Known Allergies    No current facility-administered medications on file prior to encounter.      Current Outpatient Medications on File Prior to Encounter   Medication Sig Dispense Refill    acetaminophen (TYLENOL) 325 MG tablet 650 mg by Tube route every 4 (four) hours as needed for Pain.      senna-docusate 8.6-50 mg (PERICOLACE) 8.6-50 mg per tablet 2 tablets by Tube route daily with lunch.         Past Surgical History:   Procedure Laterality Date    BIOPSY-SENTINEL NODE Left 2017    [facility]    BREAST BIOPSY Left     BREAST RECONSTRUCTION Left 2017     SECTION      COLONOSCOPY  2011    repeat in 10 yrs.    D&C      EGD (ESOPHAGOGASTRODUODENOSCOPY) N/A 2019    Performed by Pernell Rosas [facility] at [facility] ENDO (2ND FLR)    INSERTION, PEG TUBE N/A 2019    Performed by Zurdo Gordon MD at [facility] ENDO (2ND FLR)     ELVZZVTUB-RROE-A-CATH Right 10/31/2018    Performed by Deo Palencia MD at [facility] OR 2ND FLR    JZZAUOSST-OTGK-S-CATH Right 2/22/2017    Performed by [facility]o English MD at [facility] OR    DTMECBIYC-HCOC-Q-CATH-neck or chest Fluoro needed Consent AM of surgery Right 10/30/2018    Performed by [facility] Palencia MD at [facility] OR 2ND FLR    MASTECTOMY Left 06/26/2017    MASTECTOMY Left 6/26/2017    Per[facility] at [facility] OR    MYOMECTOMY      PORTACATH PLACEMENT      RECONSTRUCTION-BREAST/FLAP-SCOTT Left 6/26/2017    Performed by Sukhjinder Sewell MD at [facility] OR    SENTINEL LYMPH NODE BIOPSY  02/2017    left    TOTAL REDUCTION MAMMOPLASTY Right 2017    UPPER GASTROINTESTINAL ENDOSCOPY         Social History     Socioeconomic History    Marital status:      Spouse name: Not on file    Number of children: Not on file    Years of education: Not on file    Highest education level: Not on file   Occupational History    Occupation: banking   Social Needs    Financial resource strain: Not very hard    Food insecurity:     Worry: Never true     Inability: Never true    Transportation needs:     Medical: No     Non-medical: No   Tobacco Use    Smoking status: Never Smoker    Smokeless tobacco: Never Used   Substance and Sexual Activity    Alcohol use: Yes     Frequency: Never     Drinks per session: Patient refused     Binge frequency: Never     Comment: rare    Drug use: No    Sexual activity: Yes     Partners: Male     Birth control/protection: Post-menopausal   Lifestyle    Physical activity:     Days per week: 3 days     Minutes per session: 30 min    Stress: To some extent   Relationships    Social connections:     Talks on phone: Twice a week     Gets together: Once a week     Attends [employer] service: Not on file     Active member of club or organization: No     Attends meetings of clubs or organizations: Never     Relationship status:    Other Topics Concern    Not on  "file   Social History Narrative    Not on file         CBC: No results for input(s): WBC, RBC, HGB, HCT, PLT, MCV, MCH, MCHC in the last 72 hours.    CMP: No results for input(s): NA, K, CL, CO2, BUN, CREATININE, GLU, MG, PHOS, CALCIUM, ALBUMIN, PROT, ALKPHOS, ALT, AST, BILITOT in the last 72 hours.    INR  No results for input(s): PT, INR, PROTIME, APTT in the last 72 hours.        Diagnostic Studies:      EKD Echo:  Results for orders placed or performed during the hospital encounter of 17   2D echo with color flow doppler   Result Value Ref Range    QEF 65 55 - 65    Diastolic Dysfunction No     Est. PA Systolic Pressure 23.43     Pericardial Effusion TRIVIAL     Tricuspid Valve Regurgitation TRIVIAL TO MILD      Last 3 sets of Vitals    Vitals - 1 value per visit 2019   SYSTOLIC 161 162 186   DIASTOLIC 93 87 95   PULSE 88 98 101   TEMPERATURE 97.7 - -   RESPIRATIONS 18 16 18   SPO2 - - 100   Weight (lb) - 185.4 180.78   Weight (kg) - 84.097 82   HEIGHT - 5' 5" 5' 5"   BODY MASS INDEX - 30.85 30.08   VISIT REPORT - - -   Pain Score  0 0 0   Some recent data might be hidden       Anesthesia Evaluation    I have reviewed the Patient Summary Reports.    I have reviewed the Nursing Notes.   I have reviewed the Medications.   Prednisone    Review of Systems  Anesthesia Hx:  No problems with previous Anesthesia  History of prior surgery of interest to airway management or planning: Previous anesthesia: General Denies Family Hx of Anesthesia complications.  Personal Hx of Anesthesia complications, Post-Operative Nausea/Vomiting, in the past, but not with recent anesthetics / prophylaxis   Social:  Non-Smoker, Social Alcohol Use    Hematology/Oncology:         -- Anemia: --  Cancer in past history:  Breast left axillary node dissection chemotherapy, radiation and surgery    EENT/Dental:EENT/Dental Normal   Cardiovascular:   Exercise tolerance: good Hypertension, well controlled  " Denies Angina. ECG has been reviewed.    Pulmonary:  Pulmonary Normal  Denies Sleep Apnea.    Renal/:  Renal/ Normal     Hepatic/GI:   Bowel Prep. PUD, GERD, well controlled    Musculoskeletal:   dermatomyositis   Neurological:  Neurology Normal  Denies CVA.    Endocrine:  Endocrine Normal    Psych:   depression          Physical Exam  General:  Well nourished    Airway/Jaw/Neck:  Airway Findings: Mouth Opening: Normal Tongue: Normal  General Airway Assessment: Adult  Mallampati: II  TM Distance: Normal, at least 6 cm  Jaw/Neck Findings:  Micrognathia: Negative Neck ROM: Normal ROM  Neck Findings:      Dental:  Dental Findings: Upper partial dentures, Lower partial dentures   Chest/Lungs:  Chest/Lungs Findings: Clear to auscultation, Normal Respiratory Rate     Heart/Vascular:  Heart Findings: Rate: Normal  Rhythm: Regular Rhythm  Sounds: Normal     Abdomen:  Abdomen Findings:  Normal     Musculoskeletal:  Musculoskeletal Findings:    Skin:  Skin Findings:     Mental Status:  Mental Status Findings:  Alert and Oriented, Cooperative         Anesthesia Plan  Type of Anesthesia, risks & benefits discussed:  Anesthesia Type:  general, MAC  Patient's Preference:   Intra-op Monitoring Plan: standard ASA monitors  Intra-op Monitoring Plan Comments:   Post Op Pain Control Plan: multimodal analgesia, IV/PO Opioids PRN and per primary service following discharge from PACU  Post Op Pain Control Plan Comments:   Induction:   IV  Beta Blocker:  Patient is not currently on a Beta-Blocker (No further documentation required).       Informed Consent: Patient understands risks and agrees with Anesthesia plan.  Questions answered. Anesthesia consent signed with patient.  ASA Score: 2     Day of Surgery Review of History & Physical:    H&P update referred to the surgeon.     Anesthesia Plan Notes: Plan MAC vs GA with natural airway. BMV, invasive airway management PRN, not anticipated.   All questions answered. Informed consent  obtained. Pt agrees to proceed.         Ready For Surgery From Anesthesia Perspective.

## 2019-07-29 NOTE — TRANSFER OF CARE
"Anesthesia Transfer of Care Note    Patient: Ermelinda Verde    Procedure(s) Performed: Procedure(s) (LRB):  EGD (ESOPHAGOGASTRODUODENOSCOPY) (N/A)  COLONOSCOPY (N/A)    Patient location: GI    Anesthesia Type: general    Transport from OR: Transported from OR on room air with adequate spontaneous ventilation    Post pain: adequate analgesia    Post assessment: no apparent anesthetic complications and tolerated procedure well    Post vital signs: stable    Level of consciousness: awake    Nausea/Vomiting: no nausea/vomiting    Complications: none    Transfer of care protocol was followed      Last vitals:   Visit Vitals  BP (!) 177/97 (BP Location: Left arm, Patient Position: Sitting)   Pulse 108   Temp 36.7 °C (98.1 °F) (Temporal)   Resp 18   Ht 5' 5" (1.651 m)   Wt 79.4 kg (175 lb)   LMP  (LMP Unknown)   SpO2 100%   Breastfeeding? No   BMI 29.12 kg/m²     "

## 2019-07-29 NOTE — ANESTHESIA POSTPROCEDURE EVALUATION
Anesthesia Post Evaluation    Patient: Ermelinda Verde    Procedure(s) Performed: Procedure(s) (LRB):  EGD (ESOPHAGOGASTRODUODENOSCOPY) (N/A)  COLONOSCOPY (N/A)    Final Anesthesia Type: general  Patient location during evaluation: GI PACU  Patient participation: Yes- Able to Participate  Level of consciousness: awake and alert  Post-procedure vital signs: reviewed and stable  Pain management: adequate  Airway patency: patent  PONV status at discharge: No PONV  Anesthetic complications: no      Cardiovascular status: blood pressure returned to baseline and hemodynamically stable  Respiratory status: spontaneous ventilation, unassisted and room air  Follow-up not needed.          Vitals Value Taken Time   /86 7/29/2019  3:08 PM   Temp 36.8 °C (98.2 °F) 7/29/2019  2:38 PM   Pulse 85 7/29/2019  3:08 PM   Resp 20 7/29/2019  3:08 PM   SpO2 100 % 7/29/2019  3:08 PM         Event Time     Out of Recovery 15:28:56          Pain/Joni Score: Joni Score: 10 (7/29/2019  3:08 PM)

## 2019-07-29 NOTE — [employer] PATIENT INSTRUCTIONS
Discharge Summary/Instructions after an Endoscopic Procedure  Patient Name: Ermelinda Verde  Patient MRN: [id number]  Patient YOB: 1960 Monday, July 29, 2019  Pernell Rosas MD  RESTRICTIONS:  During your procedure today, you received medications for sedation.  These   medications may affect your judgment, balance and coordination.  Therefore,   for 24 hours, you have the following restrictions:   - DO NOT drive a car, operate machinery, make legal/financial decisions,   sign important papers or drink alcohol.    ACTIVITY:  Today: no heavy lifting, straining or running due to procedural   sedation/anesthesia.  The following day: return to full activity including work.  DIET:  Eat and drink normally unless instructed otherwise.     TREATMENT FOR COMMON SIDE EFFECTS:  - Mild abdominal pain, nausea, belching, bloating or excessive gas:  rest,   eat lightly and use a heating pad.  - Sore Throat: treat with throat lozenges and/or gargle with warm salt   water.  - Because air was used during the procedure, expelling large amounts of air   from your rectum or belching is normal.  - If a bowel prep was taken, you may not have a bowel movement for 1-3 days.    This is normal.  SYMPTOMS TO WATCH FOR AND REPORT TO YOUR PHYSICIAN:  1. Abdominal pain or bloating, other than gas cramps.  2. Chest pain.  3. Back pain.  4. Signs of infection such as: chills or fever occurring within 24 hours   after the procedure.  5. Rectal bleeding, which would show as bright red, maroon, or black stools.   (A tablespoon of blood from the rectum is not serious, especially if   hemorrhoids are present.)  6. Vomiting.  7. Weakness or dizziness.  GO DIRECTLY TO THE NEAREST EMERGENCY ROOM IF YOU HAVE ANY OF THE FOLLOWING:      Difficulty breathing              Chills and/or fever over 101 F   Persistent vomiting and/or vomiting blood   Severe abdominal pain   Severe chest pain   Black, tarry stools   Bleeding- more than one tablespoon   Any  other symptom or condition that you feel may need urgent attention  Your doctor recommends these additional instructions:  If any biopsies were taken, your doctors clinic will contact you in 1 to 2   weeks with any results.  - Patient has a contact number available for emergencies.  The signs and   symptoms of potential delayed complications were discussed with the   patient.  Return to normal activities tomorrow.  Written discharge   instructions were provided to the patient.   - Discharge patient to home.   - Repeat colonoscopy in 10 years for screening purposes.   - The findings and recommendations were discussed with the designated   responsible adult.   For questions, problems or results please call your physician - Pernell Rosas MD at Work:  [phone].  [facility], EMERGENCY ROOM PHONE NUMBER: [phone]  IF A COMPLICATION OR EMERGENCY SITUATION ARISES AND YOU ARE UNABLE TO REACH   YOUR PHYSICIAN - GO DIRECTLY TO THE EMERGENCY ROOM.  Pernell Rosas MD  7/29/2019 2:33:43 PM  This report has been verified and signed electronically.  [employer]

## 2019-07-30 ENCOUNTER — PATIENT MESSAGE (OUTPATIENT)
Dept: RHEUMATOLOGY | Facility: CLINIC | Age: 59
End: 2019-07-30

## 2019-07-31 ENCOUNTER — TELEPHONE (OUTPATIENT)
Dept: RHEUMATOLOGY | Facility: CLINIC | Age: 59
End: 2019-07-31

## 2019-07-31 DIAGNOSIS — M33.13 DERMATOMYOSITIS: Primary | ICD-10-CM

## 2019-07-31 DIAGNOSIS — M33.20 POLYMYOSITIS ASSOCIATED WITH AUTOIMMUNE DISEASE: ICD-10-CM

## 2019-07-31 DIAGNOSIS — M35.9 POLYMYOSITIS ASSOCIATED WITH AUTOIMMUNE DISEASE: ICD-10-CM

## 2019-07-31 NOTE — TELEPHONE ENCOUNTER
Called and spoke with patient.     Patient is developing rash (redness and itchy) around the eyes.  Rash is similar to one from hospitalization for myositis.    Patient also have tightness and heaviness of bilateral arms and legs     Patient currently on prednisone 10mg daily.  Had colonoscopy earlier this week (but symptoms started prior).    Case discussed with Dr. Maxwell.      Labs to be done today.  Urgent follow up scheduled for Aug 2 at 8am.

## 2019-08-02 ENCOUNTER — OFFICE VISIT (OUTPATIENT)
Dept: RHEUMATOLOGY | Facility: CLINIC | Age: 59
End: 2019-08-02
Payer: COMMERCIAL

## 2019-08-02 VITALS
SYSTOLIC BLOOD PRESSURE: 167 MMHG | BODY MASS INDEX: 30.71 KG/M2 | HEIGHT: 65 IN | HEART RATE: 93 BPM | DIASTOLIC BLOOD PRESSURE: 99 MMHG | WEIGHT: 184.31 LBS

## 2019-08-02 DIAGNOSIS — C50.412 MALIGNANT NEOPLASM OF UPPER-OUTER QUADRANT OF LEFT BREAST IN FEMALE, ESTROGEN RECEPTOR NEGATIVE: ICD-10-CM

## 2019-08-02 DIAGNOSIS — D64.9 ANEMIA, UNSPECIFIED TYPE: ICD-10-CM

## 2019-08-02 DIAGNOSIS — Z12.11 COLON CANCER SCREENING: ICD-10-CM

## 2019-08-02 DIAGNOSIS — M33.13 DERMATOMYOSITIS: Primary | ICD-10-CM

## 2019-08-02 DIAGNOSIS — Z17.1 MALIGNANT NEOPLASM OF UPPER-OUTER QUADRANT OF LEFT BREAST IN FEMALE, ESTROGEN RECEPTOR NEGATIVE: ICD-10-CM

## 2019-08-02 DIAGNOSIS — Z79.899 IMMUNOSUPPRESSION DUE TO DRUG THERAPY: ICD-10-CM

## 2019-08-02 DIAGNOSIS — Z79.899 ENCOUNTER FOR LONG-TERM CURRENT USE OF HIGH RISK MEDICATION: ICD-10-CM

## 2019-08-02 DIAGNOSIS — D84.821 IMMUNOSUPPRESSION DUE TO DRUG THERAPY: ICD-10-CM

## 2019-08-02 PROCEDURE — 3008F BODY MASS INDEX DOCD: CPT | Mod: CPTII,S$GLB,, | Performed by: STUDENT IN AN ORGANIZED HEALTH CARE EDUCATION/TRAINING PROGRAM

## 2019-08-02 PROCEDURE — 99999 PR PBB SHADOW E&M-EST. PATIENT-LVL III: ICD-10-PCS | Mod: PBBFAC,,, | Performed by: STUDENT IN AN ORGANIZED HEALTH CARE EDUCATION/TRAINING PROGRAM

## 2019-08-02 PROCEDURE — 3008F PR BODY MASS INDEX (BMI) DOCUMENTED: ICD-10-PCS | Mod: CPTII,S$GLB,, | Performed by: STUDENT IN AN ORGANIZED HEALTH CARE EDUCATION/TRAINING PROGRAM

## 2019-08-02 PROCEDURE — 3077F PR MOST RECENT SYSTOLIC BLOOD PRESSURE >= 140 MM HG: ICD-10-PCS | Mod: CPTII,S$GLB,, | Performed by: STUDENT IN AN ORGANIZED HEALTH CARE EDUCATION/TRAINING PROGRAM

## 2019-08-02 PROCEDURE — 3077F SYST BP >= 140 MM HG: CPT | Mod: CPTII,S$GLB,, | Performed by: STUDENT IN AN ORGANIZED HEALTH CARE EDUCATION/TRAINING PROGRAM

## 2019-08-02 PROCEDURE — 3080F PR MOST RECENT DIASTOLIC BLOOD PRESSURE >= 90 MM HG: ICD-10-PCS | Mod: CPTII,S$GLB,, | Performed by: STUDENT IN AN ORGANIZED HEALTH CARE EDUCATION/TRAINING PROGRAM

## 2019-08-02 PROCEDURE — 99214 OFFICE O/P EST MOD 30 MIN: CPT | Mod: S$GLB,,, | Performed by: STUDENT IN AN ORGANIZED HEALTH CARE EDUCATION/TRAINING PROGRAM

## 2019-08-02 PROCEDURE — 99999 PR PBB SHADOW E&M-EST. PATIENT-LVL III: CPT | Mod: PBBFAC,,, | Performed by: STUDENT IN AN ORGANIZED HEALTH CARE EDUCATION/TRAINING PROGRAM

## 2019-08-02 PROCEDURE — 99214 PR OFFICE/OUTPT VISIT, EST, LEVL IV, 30-39 MIN: ICD-10-PCS | Mod: S$GLB,,, | Performed by: STUDENT IN AN ORGANIZED HEALTH CARE EDUCATION/TRAINING PROGRAM

## 2019-08-02 PROCEDURE — 3080F DIAST BP >= 90 MM HG: CPT | Mod: CPTII,S$GLB,, | Performed by: STUDENT IN AN ORGANIZED HEALTH CARE EDUCATION/TRAINING PROGRAM

## 2019-08-02 RX ORDER — PREDNISONE 20 MG/1
20 TABLET ORAL DAILY
Qty: 30 TABLET | Refills: 1 | Status: SHIPPED | OUTPATIENT
Start: 2019-08-02 | End: 2019-08-02

## 2019-08-02 RX ORDER — PREDNISONE 10 MG/1
10 TABLET ORAL 2 TIMES DAILY
Qty: 60 TABLET | Refills: 1 | Status: SHIPPED | OUTPATIENT
Start: 2019-08-02 | End: 2019-09-01

## 2019-08-02 NOTE — PROGRESS NOTES
I have personally taken the history and examined the patient to verify the fellow's notation, and I agree with the impression and plan stated.   As noted, she has dermatomyositis with TIFF antibodies suggesting dermatomyositis due to malignancy. Clinically she developed this after taking PD1 inhibitor and remission of breast cancer.   Regardless, she is experiencing recurrent sx so we will increase prednisone , continue IVIg, and follow her; she will likely need MMF if her bone marrow can tolerate it.     Tao Maxwell MD  Rheumatology  Mobile: [phone]

## 2019-08-02 NOTE — PROGRESS NOTES
RHEUMATOLOGY CLINIC FOLLOW UP VISIT    Chief complaints:- Myositis       HPI:-  Ermelinda Javed a 58 y.o.F with history of L sided infiltrating ductal carcinoma of the L breast (dx on Jan 2017), HTN, depression, gastritis, and recently diagnosed myositis (uncertain if it's autoimmune vs medication induced), present today with concern about myositis flare    Patient was initially send from hem/onc clinic for evaluation of possible inflammatory myositis.  Patient was hospitalized from 1/22/19 till 2/13/19 for myositis.      L breast cancer s/p completion of 4 cycles of demi-adjuvant taxotere and cytoxan (completed on 5/9/17) and mastectomy (6/26/17) and then 4 cycles of adjuvant Adriamycin (completed 9/12/17). In 10/2017 - patient developed L supraclavicular lymphadenopathy which FNA confirmed as reoccurrence of previously treated breast cancer.      Patient started on Atelizumab/Abraxane on 11/7/18. Per chart review, patient noticed rashes on the dorsum of her hands on 11/11/18. Seen in urgent care and given topical steroid cream. Then received two more infusions on Atelizumab/Abraxane on 11/21/18 and 12/5/18. Started to notice puffiness around the eyes on 12/11/18. Received another Abraxane infusion on 12/12/18 but this time with hydrocortisone 50 mg which helped reduce the swelling around the eyes. Developed swelling of the face again on 12/15/18. Next infusion of Abraxane done on 12/19/18 with Solucortef and Atelizumab held. Abraxane given again on 1/3/19 with no IV steroid. Patient developed swelling of the face on 1/4/19 and went to urgent care and given short course of prednisone 20 mg BID. On 1/15/19, patient seen in hem/onc clinic with c/o of pressure and tightness around neck. CT scan of chest and neck did not reveal any vascular compression. Started on prednisone 60 mg with taper. On 1/22/18 patient with c/o proximal muscle weakness. CPK found to be elevated around 4k. Patient  admitted and given solumedrol 80 mg IV on 1/22/18. Last infusion of Atelizumab on 12/19/18. Last infusion of Abraxane on 1/3/19. Given Solumedrol 1g x 1 on 1/23/18. Seen by Dermatology on 1/24/18 and biopsy done of skin rash. Given distribution of rashes, there was concern for dermatomyositis. Patient started on Solumedrol 125 mg IV BID at this time.     During her hospitalization patient was started on high dose steroid Solumedrol 80mg IV x 1, 1g x 1, and then 125mg BID until tapered down to medrol 48mg.  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  Patient was started on IVIG (400mg/kg/daily x 5 day) 1/29/19-2/2.   Patient started on MTX 20mg SQ 2/5 with folic acid. MTX SQ was transitioned to PO because concern about rehab administration.  Patient failed swallow eval and PEG tube was placed.        Denies any family history of autoimmune diseases.     No smoking, EtOH, recreational drug usage.     Denies any photosensitivity, joint swelling, unintentional weigh loss, abdominal pain, night sweats, CP, SOB.  +oral thrush.     Completed rehab at [facility].  Completed IVIG (2/28 and 3/1).  Had mild HA after infusion.  Doing well.  A lot stronger - able to do household chores since discharge.  Not back at baseline yet ~80%.  Mild weakness with increased activities.  Able to ambulate without assistance (but is still a fall precaution).  Tolerating diet via PEG tube.  Completed rehab.     MTX discontinued 2/18 with concern of toxicity (decrease blood counts and transaminatis).  Folic acid increased to 4mg daily.  Still on medrol 32mg daily with atorvaquone for PCP prophylaxis.  Bactrim d/c because of interaction with leucovorin.  Leucovorin 5mg daily started - Leucovorin discontinued.  Continues to be on folic acid 4mg daily    Radiation completed (28 days) completed May 8.      EGD/colonoscopy done on July 29 - normal.  PEG tube removed    Last PET scan (June 20) showed negative for metastasis.  At this time,  will monitor per oncology and repeat PET scan in Sept.  No treatment needed at this time.     Interval History   Patient messaged with new onset of myalgia and muscle heaviness that started after tapering of prednisone from 10mg to 9mg.  Patient continued to have IVIG without any improvement of her symptoms.  Patient also developed rash/swelling of bilateral orbit and R MCP area.  Patient noticed that she is getting progressively weaker.  She is having difficulty lifting her L arm above shoulder and difficulty with putting her pants on while standing up.     Tried to take Tylenol (up to 2 tablet daily) for the symptoms.  Provided no alleviation     Denies any dysphagia, alopecia, arthralgia, abdominal pain, CP, or SOB, N/V, fever/chills.       Review of Systems   Constitutional: Negative for chills, diaphoresis, fever, malaise/fatigue and weight loss.   HENT: Negative for congestion, ear discharge, ear pain, hearing loss, nosebleeds, sinus pain and tinnitus.    Eyes: Negative for photophobia, pain, discharge and redness.   Respiratory: Negative for cough, hemoptysis, sputum production, shortness of breath, wheezing and stridor.    Cardiovascular: Negative for chest pain, palpitations, orthopnea, claudication, leg swelling and PND.   Gastrointestinal: Negative for abdominal pain, constipation, diarrhea, heartburn, nausea and vomiting.   Genitourinary: Negative for dysuria, frequency, hematuria and urgency.   Musculoskeletal: Positive for myalgias. Negative for back pain, joint pain and neck pain.   Skin: Negative for rash.   Neurological: Positive for weakness. Negative for dizziness, tingling, tremors and headaches.   Endo/Heme/Allergies: Does not bruise/bleed easily.   Psychiatric/Behavioral: Negative for depression, hallucinations and suicidal ideas. The patient is not nervous/anxious and does not have insomnia.        Past Medical History:   Diagnosis Date    Breast cancer 01/01/2017    left    Depression      Dermatomyositis     Diverticulosis     Gastritis     Hypertension     Vitamin B12 deficiency 3/8/2018       Past Surgical History:   Procedure Laterality Date    BIOPSY-SENTINEL NODE Left 2017    Performed by [facility] at [facility] OR    BREAST BIOPSY Left     BREAST RECONSTRUCTION Left 2017     SECTION      COLONOSCOPY  2011    repeat in 10 yrs.    COLONOSCOPY N/A 2019    Performed by [facility]ernell [facility]as [facility] at [facility] (4TH FLR)    D&C      EGD (ESOPHAGOGASTRODUODENOSCOPY) N/A 2019    Performed by [facility] at [facility] (4TH FLR)    EGD (ESOPHAGOGASTRODUODENOSCOPY) N/A 2019    Performed by Pernell [facility] at [facility] (2ND FLR)    INSERTION, PEG TUBE N/A 2019    Performed by Zurdo Gordon MD at [facility] (2ND FLR)    DGLKFFPOE-JSWL-O-CATH Right 10/31/2018    Performed by Deo Palencia MD at [facility] OR 2ND FLR    QVJUDDGPJ-DGAE-C-CATH Right 2017    Performed by [facility] at [facility] OR    SFZTPVMGX-PLTH-R-CATH-neck or chest Fluoro needed Consent AM of surgery Right 10/30/2018    Performed by [facility] at [facility] OR 2ND FLR    MASTECTOMY Left 2017    MASTECTOMY Left 2017    Performed by Regina Rose [facility] at [facility] OR    MYOMECTOMY      PORTACATH PLACEMENT      RECONSTRUCTION-BREAST/FLAP-SCOTT Left 2017    Performed by Sukhjinder Sewell MD at [facility] OR    SENTINEL LYMPH NODE BIOPSY  2017    left    TOTAL REDUCTION MAMMOPLASTY Right 2017    UPPER GASTROINTESTINAL ENDOSCOPY          Social History     Tobacco Use    Smoking status: Never Smoker    Smokeless tobacco: Never Used   Substance Use Topics    Alcohol use: Yes     Frequency: Never     Drinks per session: Patient refused     Binge frequency: Never     Comment: rare    Drug use: No       Family History   Problem Relation Age of Onset    Heart disease Father     Hypertension Father     Breast cancer Sister 52    Hypertension Mother     No Known Problems  "Brother     Thyroid disease Daughter         hyperthyroidism s/p thyroidectomy    No Known Problems Son     No Known Problems Brother     No Known Problems Brother     No Known Problems Sister     No Known Problems Daughter     Colon cancer Neg Hx     Ovarian cancer Neg Hx     Celiac disease Neg Hx     Cirrhosis Neg Hx     Colon polyps Neg Hx     Esophageal cancer Neg Hx     Inflammatory bowel disease Neg Hx     Liver cancer Neg Hx     Liver disease Neg Hx     Rectal cancer Neg Hx     Stomach cancer Neg Hx     Ulcerative colitis Neg Hx        Review of patient's allergies indicates:  No Known Allergies        Physical examination:-    Vitals:    08/02/19 0915   BP: (!) 167/99   Pulse: 93   Weight: 83.6 kg (184 lb 4.9 oz)   Height: 5' 5" (1.651 m)   PainSc:   3       Physical Exam   Constitutional: She is oriented to person, place, and time and well-developed, well-nourished, and in no distress.   HENT:   Head: Normocephalic and atraumatic.   Eyes: Pupils are equal, round, and reactive to light. Conjunctivae are normal.   Neck: Normal range of motion. Neck supple. Tracheal deviation: tpmt.   Cardiovascular: Normal rate, regular rhythm and normal heart sounds.   Pulmonary/Chest: Effort normal and breath sounds normal.   Abdominal: Soft. Bowel sounds are normal. Rebound: pmt.   Musculoskeletal: She exhibits no edema, tenderness or deformity.   Right Side Rheumatological Exam      Muscle Strength (0-5 scale):  Neck Flexion:  4.5  Neck Extension: 5  Deltoid:  4/5  Biceps: 5/5   Triceps:  5  : 5/5   Iliopsoas: 4/5  Quadriceps:  4/5   Distal Lower Extremity: 5     Left Side Rheumatological Exam      Muscle Strength (0-5 scale):  Neck Flexion:  4.5  Neck Extension: 5  Deltoid:  4  Biceps: /5   Triceps:  5  :  5  Iliopsoas: 4/5  Quadriceps:  4/5   Distal Lower Extremity: 5     ROM intact   Neurological: She is alert and oriented to person, place, and time. Gait normal.   Skin: Skin is warm and dry. "   Guttron's papules on hands  Bilateral helitropic rash      Psychiatric: Mood, memory, affect and judgment normal.             Radiographs:-  Independent visualization of images done.   CXR (1/28) - clear lungs  PET scan - no pulmonary/GI activity.       Medication List with Changes/Refills   Current Medications    ACETAMINOPHEN (TYLENOL) 325 MG TABLET    650 mg by Tube route every 4 (four) hours as needed for Pain.    FERROUS SULFATE (FEOSOL) 325 MG (65 MG IRON) TAB TABLET    Take 1 tablet (325 mg total) by mouth every 12 (twelve) hours.    FOLIC ACID (FOLVITE) 1 MG TABLET    TAKE 4 TABLETS(4 MG) VIA GTUBE EVERY DAY    OMEPRAZOLE (PRILOSEC) 40 MG CAPSULE    Take 1 capsule (40 mg total) by mouth 2 (two) times daily before meals.    PREDNISONE (DELTASONE) 10 MG TABLET    Take 1 tablet (10 mg total) by mouth once daily.   Discontinued Medications    SENNA-DOCUSATE 8.6-50 MG (PERICOLACE) 8.6-50 MG PER TABLET    2 tablets by Tube route daily with lunch.       Assessment/Plans:-   58 y.o.F with history of L sided infiltrating ductal carcinoma of the L breast (dx on Jan 2017), HTN, depression, gastritis, and recently diagnosed myositis (uncertain if it's autoimmune vs medication induced), present today for follow up because of concern about myositis flare.     Patient started on Atelizumab/Abraxane on 11/7/18.  Patient developed swelling of the face on 1/4/19 and went to urgent care and given short course of prednisone 20 mg BID. On 1/15/19, patient seen in hem/onc clinic with c/o of pressure and tightness around neck. CT scan of chest and neck did not reveal any vascular compression. Started on prednisone 60 mg with taper. On 1/22/18 patient with c/o proximal muscle weakness. CPK found to be elevated around 4k. Patient admitted and given solumedrol 80 mg IV on 1/22/18. Last infusion of Atelizumab on 12/19/18. Last infusion of Abraxane on 1/3/19. Given Solumedrol 1g x 1 on 1/23/18. Seen by Dermatology on 1/24/18 and  biopsy done of skin rash. Given distribution of rashes, there was concern for dermatomyositis. Patient started on Solumedrol 125 mg IV BID at that time.  Skin biopsy resulted consistent with dermatomyositis.     Labs: CPK and Aldolase normalized. +DARCY 1:640 speckled with negative profile.  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 quantTB: indeterminate, T-spot: negative     Spirometry: normal, normal diffusion capacity. FVC: 81%, DLCO: 114%     Patient exhibits signs of dermatomyositis (heliotropic rash and gottron's papules).   Dermatomyositis can be associated with malignancy.  MRI of LUE showed edema of the deltoid and biceps.  Exam with proximal muscle weakness: b/l deltoids 4/5, b/l iliopsoas 4.4/5. 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   Given +TIF1 gamma positivity, most likely dermatomyositis is from underlying malignancy.      Failed 2nd swallow eval on 2/6/19. PEG placed 2/7/2019.     Current medications:  - prednisone 10mg   - IVIG 1/29/19-2/2, 2/28-3/1, 4/1-2, 5/6-7, 6/6-7, 7/7-8  - folic acid daily 4mg    Esophageal biopsy - negative for viral infection.  Nonspecific chronic inflammation.    EGD/Colonoscopy (July 2019) - normal. PEG tube removed     At this time, patient failed steroid tapering (myalgia and rash).  Will increase steroid at this time to see if symptoms resolve.     Patient is an intermediate metabolizer based on TPMT.  Cannot start imuran.  Labs still neutropenic and thrombocytopenic at this time - uncertain of the cause (radiation induce BM fibrosis vs medication  induce?)  If labs does not improve/worsening of symptoms while tapering of steroid- consideration to start patient on Rituximab.  If labs improve, patient can be started on Cellcept.     Plan:  - CBC, CMP, CPK, Aldolase to be done prior to next visit  - increase steroid from prednisone 10mg to 10mg BID.  Stay on dose until follow up   - continue folic acid 4mg daily  - continue muscle strengthening exercise daily  - referral to PT - muscle strengthening exercises   - will need to have all vaccination complete - Prevnar, shingles  - 1200 mg dietary calcium and Vitamin D3 1000 mg daily  - continue IVIG as previously scheduled - scheduled for Aug 7/8  - message/call immediately if worsening muscle weakness      # RTC in 4 weeks

## 2019-08-05 ENCOUNTER — TELEPHONE (OUTPATIENT)
Dept: ENDOSCOPY | Facility: HOSPITAL | Age: 59
End: 2019-08-05

## 2019-08-07 ENCOUNTER — INFUSION (OUTPATIENT)
Dept: INFUSION THERAPY | Facility: HOSPITAL | Age: 59
End: 2019-08-07
Attending: INTERNAL MEDICINE
Payer: COMMERCIAL

## 2019-08-07 VITALS
RESPIRATION RATE: 18 BRPM | TEMPERATURE: 98 F | HEART RATE: 84 BPM | SYSTOLIC BLOOD PRESSURE: 166 MMHG | DIASTOLIC BLOOD PRESSURE: 90 MMHG | WEIGHT: 180.75 LBS | BODY MASS INDEX: 30.12 KG/M2 | HEIGHT: 65 IN

## 2019-08-07 DIAGNOSIS — M33.13 DERMATOMYOSITIS: Primary | ICD-10-CM

## 2019-08-07 DIAGNOSIS — R13.19 ESOPHAGEAL DYSPHAGIA: ICD-10-CM

## 2019-08-07 PROCEDURE — 63600175 PHARM REV CODE 636 W HCPCS: Performed by: INTERNAL MEDICINE

## 2019-08-07 PROCEDURE — 96366 THER/PROPH/DIAG IV INF ADDON: CPT

## 2019-08-07 PROCEDURE — S0028 INJECTION, FAMOTIDINE, 20 MG: HCPCS | Performed by: INTERNAL MEDICINE

## 2019-08-07 PROCEDURE — 96367 TX/PROPH/DG ADDL SEQ IV INF: CPT

## 2019-08-07 PROCEDURE — 25000003 PHARM REV CODE 250: Performed by: INTERNAL MEDICINE

## 2019-08-07 PROCEDURE — 96375 TX/PRO/DX INJ NEW DRUG ADDON: CPT

## 2019-08-07 PROCEDURE — 96365 THER/PROPH/DIAG IV INF INIT: CPT

## 2019-08-07 RX ORDER — SODIUM CHLORIDE 0.9 % (FLUSH) 0.9 %
10 SYRINGE (ML) INJECTION
Status: DISCONTINUED | OUTPATIENT
Start: 2019-08-07 | End: 2019-08-07 | Stop reason: HOSPADM

## 2019-08-07 RX ORDER — HEPARIN 100 UNIT/ML
500 SYRINGE INTRAVENOUS
Status: CANCELLED | OUTPATIENT
Start: 2019-09-04

## 2019-08-07 RX ORDER — FAMOTIDINE 10 MG/ML
20 INJECTION INTRAVENOUS
Status: COMPLETED | OUTPATIENT
Start: 2019-08-07 | End: 2019-08-07

## 2019-08-07 RX ORDER — FAMOTIDINE 10 MG/ML
20 INJECTION INTRAVENOUS
Status: CANCELLED | OUTPATIENT
Start: 2019-09-04

## 2019-08-07 RX ORDER — SODIUM CHLORIDE 0.9 % (FLUSH) 0.9 %
10 SYRINGE (ML) INJECTION
Status: CANCELLED | OUTPATIENT
Start: 2019-09-04

## 2019-08-07 RX ORDER — ACETAMINOPHEN 325 MG/1
650 TABLET ORAL
Status: COMPLETED | OUTPATIENT
Start: 2019-08-07 | End: 2019-08-07

## 2019-08-07 RX ORDER — HEPARIN 100 UNIT/ML
500 SYRINGE INTRAVENOUS
Status: DISCONTINUED | OUTPATIENT
Start: 2019-08-07 | End: 2019-08-07 | Stop reason: HOSPADM

## 2019-08-07 RX ORDER — ACETAMINOPHEN 325 MG/1
650 TABLET ORAL
Status: CANCELLED | OUTPATIENT
Start: 2019-09-04

## 2019-08-07 RX ADMIN — HEPARIN 500 UNITS: 100 SYRINGE at 12:08

## 2019-08-07 RX ADMIN — SODIUM CHLORIDE: 0.9 INJECTION, SOLUTION INTRAVENOUS at 08:08

## 2019-08-07 RX ADMIN — HUMAN IMMUNOGLOBULIN G 80 G: 20 LIQUID INTRAVENOUS at 08:08

## 2019-08-07 RX ADMIN — ACETAMINOPHEN 650 MG: 325 TABLET ORAL at 08:08

## 2019-08-07 RX ADMIN — FAMOTIDINE 20 MG: 10 INJECTION INTRAVENOUS at 08:08

## 2019-08-07 RX ADMIN — DIPHENHYDRAMINE HYDROCHLORIDE 50 MG: 50 INJECTION, SOLUTION INTRAMUSCULAR; INTRAVENOUS at 08:08

## 2019-08-07 NOTE — PLAN OF CARE
Problem: Adult Inpatient Plan of Care  Goal: Plan of Care Review  Outcome: Ongoing (interventions implemented as appropriate)  Pt tolerated IVIG with no complications. VSS. Pt instructed to call MD with any problems and RTC tomorrow for D2. NAD. Pt discharged home independently.

## 2019-08-08 ENCOUNTER — TELEPHONE (OUTPATIENT)
Dept: HEMATOLOGY/ONCOLOGY | Facility: CLINIC | Age: 59
End: 2019-08-08

## 2019-08-08 ENCOUNTER — INFUSION (OUTPATIENT)
Dept: INFUSION THERAPY | Facility: HOSPITAL | Age: 59
End: 2019-08-08
Attending: INTERNAL MEDICINE
Payer: COMMERCIAL

## 2019-08-08 VITALS
TEMPERATURE: 98 F | HEART RATE: 80 BPM | SYSTOLIC BLOOD PRESSURE: 166 MMHG | DIASTOLIC BLOOD PRESSURE: 100 MMHG | RESPIRATION RATE: 18 BRPM

## 2019-08-08 DIAGNOSIS — R13.19 ESOPHAGEAL DYSPHAGIA: ICD-10-CM

## 2019-08-08 DIAGNOSIS — M33.13 DERMATOMYOSITIS: Primary | ICD-10-CM

## 2019-08-08 PROCEDURE — 96365 THER/PROPH/DIAG IV INF INIT: CPT

## 2019-08-08 PROCEDURE — 96367 TX/PROPH/DG ADDL SEQ IV INF: CPT

## 2019-08-08 PROCEDURE — S0028 INJECTION, FAMOTIDINE, 20 MG: HCPCS | Performed by: INTERNAL MEDICINE

## 2019-08-08 PROCEDURE — 63600175 PHARM REV CODE 636 W HCPCS: Performed by: INTERNAL MEDICINE

## 2019-08-08 PROCEDURE — 96366 THER/PROPH/DIAG IV INF ADDON: CPT

## 2019-08-08 PROCEDURE — 25000003 PHARM REV CODE 250: Performed by: INTERNAL MEDICINE

## 2019-08-08 PROCEDURE — 96375 TX/PRO/DX INJ NEW DRUG ADDON: CPT

## 2019-08-08 RX ORDER — HEPARIN 100 UNIT/ML
500 SYRINGE INTRAVENOUS
Status: DISCONTINUED | OUTPATIENT
Start: 2019-08-08 | End: 2019-08-08 | Stop reason: HOSPADM

## 2019-08-08 RX ORDER — FAMOTIDINE 10 MG/ML
20 INJECTION INTRAVENOUS
Status: CANCELLED | OUTPATIENT
Start: 2019-09-04

## 2019-08-08 RX ORDER — SODIUM CHLORIDE 0.9 % (FLUSH) 0.9 %
10 SYRINGE (ML) INJECTION
Status: DISCONTINUED | OUTPATIENT
Start: 2019-08-08 | End: 2019-08-08 | Stop reason: HOSPADM

## 2019-08-08 RX ORDER — FAMOTIDINE 10 MG/ML
20 INJECTION INTRAVENOUS
Status: COMPLETED | OUTPATIENT
Start: 2019-08-08 | End: 2019-08-08

## 2019-08-08 RX ORDER — ACETAMINOPHEN 325 MG/1
650 TABLET ORAL
Status: COMPLETED | OUTPATIENT
Start: 2019-08-08 | End: 2019-08-08

## 2019-08-08 RX ORDER — ACETAMINOPHEN 325 MG/1
650 TABLET ORAL
Status: CANCELLED | OUTPATIENT
Start: 2019-09-04

## 2019-08-08 RX ORDER — HEPARIN 100 UNIT/ML
500 SYRINGE INTRAVENOUS
Status: CANCELLED | OUTPATIENT
Start: 2019-09-04

## 2019-08-08 RX ORDER — SODIUM CHLORIDE 0.9 % (FLUSH) 0.9 %
10 SYRINGE (ML) INJECTION
Status: CANCELLED | OUTPATIENT
Start: 2019-09-04

## 2019-08-08 RX ADMIN — HEPARIN 500 UNITS: 100 SYRINGE at 12:08

## 2019-08-08 RX ADMIN — ACETAMINOPHEN 650 MG: 325 TABLET ORAL at 07:08

## 2019-08-08 RX ADMIN — FAMOTIDINE 20 MG: 10 INJECTION INTRAVENOUS at 08:08

## 2019-08-08 RX ADMIN — DIPHENHYDRAMINE HYDROCHLORIDE 50 MG: 50 INJECTION, SOLUTION INTRAMUSCULAR; INTRAVENOUS at 08:08

## 2019-08-08 RX ADMIN — HUMAN IMMUNOGLOBULIN G 80 G: 20 LIQUID INTRAVENOUS at 08:08

## 2019-08-08 NOTE — TELEPHONE ENCOUNTER
----- Message from Claudette Orona sent at 8/8/2019  8:22 AM CDT -----  Contact: Pt   Pt called to speak with nurse Alejandro have some general questions   Callback#[phone]  Thank You  TANMAY Orona

## 2019-08-08 NOTE — TELEPHONE ENCOUNTER
Returned call to patient to discuss her questions/concerns. Patient requesting letter needing to be sent over to her employer. She states that they are requesting an exact date be implicated and specific restrictions as the previous letter received was too vague.   Specifications necessary discussed.   Will complete new letter, have Dr. Reyna review and fax to her HR department as well make copy for her  this afternoon.   She voiced appreciation.     No further questions/concerns at this time.

## 2019-08-08 NOTE — PLAN OF CARE
Problem: Adult Inpatient Plan of Care  Goal: Plan of Care Review  Outcome: Ongoing (interventions implemented as appropriate)  1250:  Patient tolerated day 2 IVIG well, VSS, BP elevated, NAD noted, no c/o headache.  Port flushed per protocol and de-accessed.  AVS given.  Patient advised to drink fluids and monitor BP at home, contact MD if DBP>100.  Patient verbalized understanding, released in stable condition, ambulated off unit accompanied by daughter.

## 2019-08-22 ENCOUNTER — PATIENT MESSAGE (OUTPATIENT)
Dept: RHEUMATOLOGY | Facility: CLINIC | Age: 59
End: 2019-08-22

## 2019-08-23 ENCOUNTER — PATIENT MESSAGE (OUTPATIENT)
Dept: RHEUMATOLOGY | Facility: CLINIC | Age: 59
End: 2019-08-23

## 2019-08-26 ENCOUNTER — TELEPHONE (OUTPATIENT)
Dept: RHEUMATOLOGY | Facility: CLINIC | Age: 59
End: 2019-08-26

## 2019-08-26 NOTE — TELEPHONE ENCOUNTER
Received message to call back.    Patient wanted rheumatology to send note over workplace about limitations.  Patient will be seen on Sept 4 - will evaluate at that time. Patient understands

## 2019-08-27 ENCOUNTER — PROCEDURE VISIT (OUTPATIENT)
Dept: HEPATOLOGY | Facility: CLINIC | Age: 59
End: 2019-08-27
Attending: NURSE PRACTITIONER
Payer: COMMERCIAL

## 2019-08-27 ENCOUNTER — LAB VISIT (OUTPATIENT)
Dept: LAB | Facility: HOSPITAL | Age: 59
End: 2019-08-27
Attending: INTERNAL MEDICINE
Payer: COMMERCIAL

## 2019-08-27 DIAGNOSIS — R74.8 ELEVATED LIVER ENZYMES: ICD-10-CM

## 2019-08-27 DIAGNOSIS — D50.9 IRON DEFICIENCY ANEMIA, UNSPECIFIED IRON DEFICIENCY ANEMIA TYPE: ICD-10-CM

## 2019-08-27 LAB
ALBUMIN SERPL BCP-MCNC: 3.6 G/DL (ref 3.5–5.2)
ALP SERPL-CCNC: 66 U/L (ref 55–135)
ALT SERPL W/O P-5'-P-CCNC: 22 U/L (ref 10–44)
AST SERPL-CCNC: 42 U/L (ref 10–40)
BILIRUB DIRECT SERPL-MCNC: 0.2 MG/DL (ref 0.1–0.3)
BILIRUB SERPL-MCNC: 0.4 MG/DL (ref 0.1–1)
FERRITIN SERPL-MCNC: 229 NG/ML (ref 20–300)
HGB BLD-MCNC: 12 G/DL (ref 12–16)
IRON SERPL-MCNC: 86 UG/DL (ref 30–160)
PROT SERPL-MCNC: 9 G/DL (ref 6–8.4)
SATURATED IRON: 22 % (ref 20–50)
TOTAL IRON BINDING CAPACITY: 389 UG/DL (ref 250–450)
TRANSFERRIN SERPL-MCNC: 263 MG/DL (ref 200–375)

## 2019-08-27 PROCEDURE — 83540 ASSAY OF IRON: CPT

## 2019-08-27 PROCEDURE — 80076 HEPATIC FUNCTION PANEL: CPT

## 2019-08-27 PROCEDURE — 91200 PR LIVER ELASTOGRAPHY W/OUT IMAG W/INTERP & REPORT: ICD-10-PCS | Mod: S$GLB,,, | Performed by: NURSE PRACTITIONER

## 2019-08-27 PROCEDURE — 36415 COLL VENOUS BLD VENIPUNCTURE: CPT

## 2019-08-27 PROCEDURE — 91200 LIVER ELASTOGRAPHY: CPT | Mod: S$GLB,,, | Performed by: NURSE PRACTITIONER

## 2019-08-27 PROCEDURE — 82728 ASSAY OF FERRITIN: CPT

## 2019-08-27 PROCEDURE — 85018 HEMOGLOBIN: CPT

## 2019-08-30 ENCOUNTER — PATIENT MESSAGE (OUTPATIENT)
Dept: HEPATOLOGY | Facility: CLINIC | Age: 59
End: 2019-08-30

## 2019-08-30 NOTE — PROCEDURES
Fibroscan Procedure     Name: Ermelinda Verde  Date of Procedure : 2019   :: April Cody NP  Diagnosis: Elevated liver enzymes    Probe: M    Findings  Median liver stiffness score: 5.6 KPa  CAP readin dB/m    IQR/med: 11 %    Interpretation  Fibrosis interpretation is based on medial liver stiffness - Kilopascal (kPa).     Fibrosis stage: F 0-1     Steatosis interpretation is based on controlled attenuation parameter - (dB/m).    Steatosis grade: <S1

## 2019-09-03 DIAGNOSIS — R74.8 ELEVATED LIVER ENZYMES: Primary | ICD-10-CM

## 2019-09-03 NOTE — PROGRESS NOTES
RHEUMATOLOGY CLINIC FOLLOW UP VISIT    Chief complaints:- Myositis       HPI:-  Ermelinda Javed a 58 y.o.F with history of L sided infiltrating ductal carcinoma of the L breast (dx on Jan 2017), HTN, depression, gastritis, and recently diagnosed myositis (uncertain if it's autoimmune vs medication induced), present today with concern about myositis flare    Patient was initially send from hem/onc clinic for evaluation of possible inflammatory myositis.  Patient was hospitalized from 1/22/19 till 2/13/19 for myositis.      L breast cancer s/p completion of 4 cycles of demi-adjuvant taxotere and cytoxan (completed on 5/9/17) and mastectomy (6/26/17) and then 4 cycles of adjuvant Adriamycin (completed 9/12/17). In 10/2017 - patient developed L supraclavicular lymphadenopathy which FNA confirmed as reoccurrence of previously treated breast cancer.      Patient started on Atelizumab/Abraxane on 11/7/18. Per chart review, patient noticed rashes on the dorsum of her hands on 11/11/18. Seen in urgent care and given topical steroid cream. Then received two more infusions on Atelizumab/Abraxane on 11/21/18 and 12/5/18. Started to notice puffiness around the eyes on 12/11/18. Received another Abraxane infusion on 12/12/18 but this time with hydrocortisone 50 mg which helped reduce the swelling around the eyes. Developed swelling of the face again on 12/15/18. Next infusion of Abraxane done on 12/19/18 with Solucortef and Atelizumab held. Abraxane given again on 1/3/19 with no IV steroid. Patient developed swelling of the face on 1/4/19 and went to urgent care and given short course of prednisone 20 mg BID. On 1/15/19, patient seen in hem/onc clinic with c/o of pressure and tightness around neck. CT scan of chest and neck did not reveal any vascular compression. Started on prednisone 60 mg with taper. On 1/22/18 patient with c/o proximal muscle weakness. CPK found to be elevated around 4k. Patient  admitted and given solumedrol 80 mg IV on 1/22/18. Last infusion of Atelizumab on 12/19/18. Last infusion of Abraxane on 1/3/19. Given Solumedrol 1g x 1 on 1/23/18. Seen by Dermatology on 1/24/18 and biopsy done of skin rash. Given distribution of rashes, there was concern for dermatomyositis. Patient started on Solumedrol 125 mg IV BID at this time.     During her hospitalization patient was started on high dose steroid Solumedrol 80mg IV x 1, 1g x 1, and then 125mg BID until tapered down to medrol 48mg.  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  Patient was started on IVIG (400mg/kg/daily x 5 day) 1/29/19-2/2.   Patient started on MTX 20mg SQ 2/5 with folic acid. MTX SQ was transitioned to PO because concern about rehab administration.  Patient failed swallow eval and PEG tube was placed.        Denies any family history of autoimmune diseases.     No smoking, EtOH, recreational drug usage.     Denies any photosensitivity, joint swelling, unintentional weigh loss, abdominal pain, night sweats, CP, SOB.  +oral thrush.     Completed rehab at [facility].  Completed IVIG (2/28 and 3/1).  Had mild HA after infusion.  Doing well.  A lot stronger - able to do household chores since discharge.  Not back at baseline yet ~80%.  Mild weakness with increased activities.  Able to ambulate without assistance (but is still a fall precaution).  Tolerating diet via PEG tube.  Completed rehab.     MTX discontinued 2/18 with concern of toxicity (decrease blood counts and transaminatis).  Folic acid increased to 4mg daily.  Still on medrol 32mg daily with atorvaquone for PCP prophylaxis.  Bactrim d/c because of interaction with leucovorin.  Leucovorin 5mg daily started - Leucovorin discontinued.  Continues to be on folic acid 4mg daily    Radiation completed (28 days) completed May 8.      EGD/colonoscopy done on July 29 - normal.  PEG tube removed    Last PET scan (June 20) showed negative for metastasis.  At this time,  will monitor per oncology and repeat PET scan in Sept.  No treatment needed at this time.     Interval History   Last visit, patient was experiencing flare.  Steroid increased to 10mg BID.  Patient increased it to 25mg daily.  Doing well with increase of steroid.      Patient states that she noticed increase myalgia and rash (helitropic and gottron) appearing around the same time.  Patient states that IVIG only works for ~3 weeks and then wears off.    Patient doing very well with PT - increased muscle strength.  She is planning to return back to work (parttime ~30 hours/week) next week with limitations (no heavy lifting and prolonged standing).  Patient is a .      Denies any dysphagia, alopecia, arthralgia, abdominal pain, CP, or SOB, N/V, fever/chills.       Review of Systems   Constitutional: Negative for chills, diaphoresis, fever, malaise/fatigue and weight loss.   HENT: Negative for congestion, ear discharge, ear pain, hearing loss, nosebleeds, sinus pain and tinnitus.    Eyes: Negative for photophobia, pain, discharge and redness.   Respiratory: Negative for cough, hemoptysis, sputum production, shortness of breath, wheezing and stridor.    Cardiovascular: Negative for chest pain, palpitations, orthopnea, claudication, leg swelling and PND.   Gastrointestinal: Negative for abdominal pain, constipation, diarrhea, heartburn, nausea and vomiting.   Genitourinary: Negative for dysuria, frequency, hematuria and urgency.   Musculoskeletal: Negative for back pain, joint pain, myalgias and neck pain.   Skin: Negative for rash.   Neurological: Negative for dizziness, tingling, tremors, weakness and headaches.   Endo/Heme/Allergies: Does not bruise/bleed easily.   Psychiatric/Behavioral: Negative for depression, hallucinations and suicidal ideas. The patient is not nervous/anxious and does not have insomnia.        Past Medical History:   Diagnosis Date    Breast cancer 01/01/2017    left    Depression      Dermatomyositis     Diverticulosis     Gastritis     Hypertension     Vitamin B12 deficiency 3/8/2018       Past Surgical History:   Procedure Laterality Date    BIOPSY-SENTINEL NODE Left 2017    [facility] at [facility] OR    BREAST BIOPSY Left     BREAST RECONSTRUCTION Left 2017     SECTION      COLONOSCOPY  2011    repeat in 10 yrs.    COLONOSCOPY N/A 2019    Performed [facility]ernell [facility] at [facility] ENDO (4TH FLR)    D&C      EGD (ESOPHAGOGASTRODUODENOSCOPY) N/A 2019    Performed by [facility]ern[facility] MD at [facility] ENDO (4TH FLR)    EGD (ESOPHAGOGASTRODUODENOSCOPY) N/A 2019    Performed by Pernell [facility] MD at [facility] ENDO (2ND FLR)    INSERTION, PEG TUBE N/A 2019    Performed by Zurdo Gordon MD at [facility] ENDO (2ND FLR)    PQOGVNLVY-ACHS-L-CATH Right 10/31/2018    Performed by Deo Palencia MD at [facility] OR 2ND FLR    MQOXOEWOZ-DYAK-U-CATH Right 2017    [facility] at [facility] OR    WFYOCTUZU-LRPM-Q-CATH-neck or chest Fluoro needed Consent AM of surgery Right 10/30/2018    Performed by De[facility] Palencia [facility] at [facility] OR 2ND FLR    MASTECTOMY Left 2017    MASTECTOMY Left 2017    [facility] OR    MYOMECTOMY      PORTACATH PLACEMENT      RECONSTRUCTION-BREAST/FLAP-SCOTT Left 2017    Performed by Sukhjinder Sewell MD at [facility] OR    SENTINEL LYMPH NODE BIOPSY  2017    left    TOTAL REDUCTION MAMMOPLASTY Right 2017    UPPER GASTROINTESTINAL ENDOSCOPY          Social History     Tobacco Use    Smoking status: Never Smoker    Smokeless tobacco: Never Used   Substance Use Topics    Alcohol use: Yes     Frequency: Never     Drinks per session: Patient refused     Binge frequency: Never     Comment: rare    Drug use: No       Family History   Problem Relation Age of Onset    Heart disease Father     Hypertension Father     Breast cancer Sister 52    Hypertension Mother     No Known Problems  "Brother     Thyroid disease Daughter         hyperthyroidism s/p thyroidectomy    No Known Problems Son     No Known Problems Brother     No Known Problems Brother     No Known Problems Sister     No Known Problems Daughter     Colon cancer Neg Hx     Ovarian cancer Neg Hx     Celiac disease Neg Hx     Cirrhosis Neg Hx     Colon polyps Neg Hx     Esophageal cancer Neg Hx     Inflammatory bowel disease Neg Hx     Liver cancer Neg Hx     Liver disease Neg Hx     Rectal cancer Neg Hx     Stomach cancer Neg Hx     Ulcerative colitis Neg Hx        Review of patient's allergies indicates:  No Known Allergies        Physical examination:-    Vitals:    09/04/19 0908   BP: (!) 149/87   Pulse: 87   Weight: 82.4 kg (181 lb 10.5 oz)   Height: 5' 5" (1.651 m)   PainSc:   1       Physical Exam   Constitutional: She is oriented to person, place, and time and well-developed, well-nourished, and in no distress.   HENT:   Head: Normocephalic and atraumatic.   Eyes: Pupils are equal, round, and reactive to light. Conjunctivae are normal.   Neck: Normal range of motion. Neck supple. Tracheal deviation: tpmt.   Cardiovascular: Normal rate, regular rhythm and normal heart sounds.   Pulmonary/Chest: Effort normal and breath sounds normal.   Abdominal: Soft. Bowel sounds are normal. Rebound: pmt.   Musculoskeletal: She exhibits no edema, tenderness or deformity.   Right Side Rheumatological Exam      Muscle Strength (0-5 scale):  Neck Flexion:  4.5  Neck Extension: 5  Deltoid:  4/5  Biceps: 5/5   Triceps:  5  : 5/5   Iliopsoas: 4/5  Quadriceps:  4/5   Distal Lower Extremity: 5     Left Side Rheumatological Exam      Muscle Strength (0-5 scale):  Neck Flexion:  4.5  Neck Extension: 5  Deltoid:  5  Biceps: /5   Triceps:  5  :  5  Iliopsoas: 4.5/5  Quadriceps:  4.5/5   Distal Lower Extremity: 5     ROM intact   Neurological: She is alert and oriented to person, place, and time. Gait normal.   Skin: Skin is warm and " dry.   Guttron's papules on hands  Bilateral helitropic rash      Psychiatric: Mood, memory, affect and judgment normal.             Radiographs:-  Independent visualization of images done.   CXR (1/28) - clear lungs  PET scan - no pulmonary/GI activity.       Medication List with Changes/Refills   New Medications    MYCOPHENOLATE (CELLCEPT) 500 MG TAB    Take 1 tablet (500 mg total) by mouth once daily.    PREDNISONE (DELTASONE) 1 MG TABLET    Take 4 tablets (4 mg total) by mouth once daily.   Current Medications    ACETAMINOPHEN (TYLENOL) 325 MG TABLET    650 mg by Tube route every 4 (four) hours as needed for Pain.    FERROUS SULFATE (FEOSOL) 325 MG (65 MG IRON) TAB TABLET    Take 1 tablet (325 mg total) by mouth every 12 (twelve) hours.    FLUTICASONE PROPIONATE (FLONASE) 50 MCG/ACTUATION NASAL SPRAY       Changed and/or Refilled Medications    Modified Medication Previous Medication    FOLIC ACID (FOLVITE) 1 MG TABLET folic acid (FOLVITE) 1 MG tablet       TAKE 4 TABLETS(4 MG) VIA GTUBE EVERY DAY    TAKE 4 TABLETS(4 MG) VIA GTUBE EVERY DAY    PREDNISONE (DELTASONE) 5 MG TABLET predniSONE (DELTASONE) 5 MG tablet       Take 3 tablets (15 mg total) by mouth once daily.       Discontinued Medications    OMEPRAZOLE (PRILOSEC) 40 MG CAPSULE    Take 1 capsule (40 mg total) by mouth 2 (two) times daily before meals.    PREDNISONE (DELTASONE) 20 MG TABLET           Assessment/Plans:-   58 y.o.F with history of L sided infiltrating ductal carcinoma of the L breast (dx on Jan 2017), HTN, depression, gastritis, and recently diagnosed myositis (uncertain if it's autoimmune vs medication induced), present today for follow up because of concern about myositis flare.     Patient started on Atelizumab/Abraxane on 11/7/18.  Patient developed swelling of the face on 1/4/19 and went to urgent care and given short course of prednisone 20 mg BID. On 1/15/19, patient seen in hem/onc clinic with c/o of pressure and tightness around  neck. CT scan of chest and neck did not reveal any vascular compression. Started on prednisone 60 mg with taper. On 1/22/18 patient with c/o proximal muscle weakness. CPK found to be elevated around 4k. Patient admitted and given solumedrol 80 mg IV on 1/22/18. Last infusion of Atelizumab on 12/19/18. Last infusion of Abraxane on 1/3/19. Given Solumedrol 1g x 1 on 1/23/18. Seen by Dermatology on 1/24/18 and biopsy done of skin rash. Given distribution of rashes, there was concern for dermatomyositis. Patient started on Solumedrol 125 mg IV BID at that time.  Skin biopsy resulted consistent with dermatomyositis.     Labs: CPK and Aldolase normalized. +DARCY 1:640 speckled with negative profile.  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 quantTB: indeterminate, T-spot: negative     Spirometry: normal, normal diffusion capacity. FVC: 81%, DLCO: 114%     Patient exhibits signs of dermatomyositis (heliotropic rash and gottron's papules).   Dermatomyositis can be associated with malignancy.  MRI of LUE showed edema of the deltoid and biceps.  Exam with proximal muscle weakness: b/l deltoids 4/5, b/l iliopsoas 4.4/5. 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   Given +TIF1 gamma positivity, most likely dermatomyositis is from underlying malignancy.      Failed 2nd swallow eval on 2/6/19. PEG placed 2/7/2019.     Current medications:  - prednisone 25mg  - IVIG 1/29/19-2/2, 2/28-3/1, 4/1-2, 5/6-7, 6/6-7, 7/7-8  - folic acid daily 4mg    Esophageal biopsy - negative for viral infection.  Nonspecific chronic  inflammation.    EGD/Colonoscopy (July 2019) - normal. PEG tube removed     Fiboscan done Aug 2019 - showed fatty liver with no scarring/damage.,     At this time, patient failed steroid tapering (myalgia and rash).  Will increase steroid at this time to see if symptoms resolve.     Patient is an intermediate metabolizer based on TPMT.  Cannot start imuran.  Labs still neutropenic and thrombocytopenic at this time - uncertain of the cause (radiation induce BM fibrosis vs medication induce?)     Patient unable to tolerate steroid tapering (still on prednisone 25mg daily).  Will start Cellcept 500mg daily to see if we can taper steroid.  If labs worsen, consideration for Rituxan as steroid sparring agent or increase IVIG to 3 weeks.     Vaccination completed - Prevnar and Shingles (completed Aug 2019) and flu vaccination for this season (Aug 2019)     Plan:  - CBC to be done in 4 weeks - if stable labs, consideration to increase Cellcept to 500mg bid  - start cellcept 500mg daily  - CBC, CMP, CPK, Aldolase, ESR, CRP to be done prior to next visit  - taper steroid - start at 19mg daily. Taper 1mg/2 weeks until follow up   - continue folic acid 4mg daily  - continue muscle strengthening exercise daily  - continue PT   - 1200 mg dietary calcium and Vitamin D3 1000 mg daily  - continue IVIG as previously scheduled - scheduled for Aug 7/8  - message/call immediately if worsening muscle weakness  - Return back to work with limitation paperwork filled and signed.       # RTC in 8 weeks

## 2019-09-04 ENCOUNTER — OFFICE VISIT (OUTPATIENT)
Dept: RHEUMATOLOGY | Facility: CLINIC | Age: 59
End: 2019-09-04
Payer: COMMERCIAL

## 2019-09-04 ENCOUNTER — TELEPHONE (OUTPATIENT)
Dept: PHARMACY | Facility: CLINIC | Age: 59
End: 2019-09-04

## 2019-09-04 VITALS
SYSTOLIC BLOOD PRESSURE: 149 MMHG | BODY MASS INDEX: 30.27 KG/M2 | HEART RATE: 87 BPM | DIASTOLIC BLOOD PRESSURE: 87 MMHG | HEIGHT: 65 IN | WEIGHT: 181.69 LBS

## 2019-09-04 DIAGNOSIS — M35.9 POLYMYOSITIS ASSOCIATED WITH AUTOIMMUNE DISEASE: ICD-10-CM

## 2019-09-04 DIAGNOSIS — Z79.899 ENCOUNTER FOR LONG-TERM CURRENT USE OF HIGH RISK MEDICATION: ICD-10-CM

## 2019-09-04 DIAGNOSIS — Z79.899 IMMUNOSUPPRESSION DUE TO DRUG THERAPY: ICD-10-CM

## 2019-09-04 DIAGNOSIS — Z17.1 MALIGNANT NEOPLASM OF UPPER-OUTER QUADRANT OF LEFT BREAST IN FEMALE, ESTROGEN RECEPTOR NEGATIVE: ICD-10-CM

## 2019-09-04 DIAGNOSIS — M33.20 POLYMYOSITIS ASSOCIATED WITH AUTOIMMUNE DISEASE: ICD-10-CM

## 2019-09-04 DIAGNOSIS — M33.13 DERMATOMYOSITIS: Primary | ICD-10-CM

## 2019-09-04 DIAGNOSIS — M60.9 MYOSITIS, UNSPECIFIED MYOSITIS TYPE, UNSPECIFIED SITE: ICD-10-CM

## 2019-09-04 DIAGNOSIS — D64.9 ANEMIA, UNSPECIFIED TYPE: ICD-10-CM

## 2019-09-04 DIAGNOSIS — C50.412 MALIGNANT NEOPLASM OF UPPER-OUTER QUADRANT OF LEFT BREAST IN FEMALE, ESTROGEN RECEPTOR NEGATIVE: ICD-10-CM

## 2019-09-04 DIAGNOSIS — D84.821 IMMUNOSUPPRESSION DUE TO DRUG THERAPY: ICD-10-CM

## 2019-09-04 PROCEDURE — 3077F PR MOST RECENT SYSTOLIC BLOOD PRESSURE >= 140 MM HG: ICD-10-PCS | Mod: CPTII,S$GLB,, | Performed by: STUDENT IN AN ORGANIZED HEALTH CARE EDUCATION/TRAINING PROGRAM

## 2019-09-04 PROCEDURE — 3079F DIAST BP 80-89 MM HG: CPT | Mod: CPTII,S$GLB,, | Performed by: STUDENT IN AN ORGANIZED HEALTH CARE EDUCATION/TRAINING PROGRAM

## 2019-09-04 PROCEDURE — 99999 PR PBB SHADOW E&M-EST. PATIENT-LVL IV: ICD-10-PCS | Mod: PBBFAC,,, | Performed by: STUDENT IN AN ORGANIZED HEALTH CARE EDUCATION/TRAINING PROGRAM

## 2019-09-04 PROCEDURE — 3077F SYST BP >= 140 MM HG: CPT | Mod: CPTII,S$GLB,, | Performed by: STUDENT IN AN ORGANIZED HEALTH CARE EDUCATION/TRAINING PROGRAM

## 2019-09-04 PROCEDURE — 99214 OFFICE O/P EST MOD 30 MIN: CPT | Mod: S$GLB,,, | Performed by: STUDENT IN AN ORGANIZED HEALTH CARE EDUCATION/TRAINING PROGRAM

## 2019-09-04 PROCEDURE — 99999 PR PBB SHADOW E&M-EST. PATIENT-LVL IV: CPT | Mod: PBBFAC,,, | Performed by: STUDENT IN AN ORGANIZED HEALTH CARE EDUCATION/TRAINING PROGRAM

## 2019-09-04 PROCEDURE — 3008F PR BODY MASS INDEX (BMI) DOCUMENTED: ICD-10-PCS | Mod: CPTII,S$GLB,, | Performed by: STUDENT IN AN ORGANIZED HEALTH CARE EDUCATION/TRAINING PROGRAM

## 2019-09-04 PROCEDURE — 3079F PR MOST RECENT DIASTOLIC BLOOD PRESSURE 80-89 MM HG: ICD-10-PCS | Mod: CPTII,S$GLB,, | Performed by: STUDENT IN AN ORGANIZED HEALTH CARE EDUCATION/TRAINING PROGRAM

## 2019-09-04 PROCEDURE — 99214 PR OFFICE/OUTPT VISIT, EST, LEVL IV, 30-39 MIN: ICD-10-PCS | Mod: S$GLB,,, | Performed by: STUDENT IN AN ORGANIZED HEALTH CARE EDUCATION/TRAINING PROGRAM

## 2019-09-04 PROCEDURE — 3008F BODY MASS INDEX DOCD: CPT | Mod: CPTII,S$GLB,, | Performed by: STUDENT IN AN ORGANIZED HEALTH CARE EDUCATION/TRAINING PROGRAM

## 2019-09-04 RX ORDER — PREDNISONE 1 MG/1
4 TABLET ORAL DAILY
Qty: 120 TABLET | Refills: 0 | Status: SHIPPED | OUTPATIENT
Start: 2019-09-04 | End: 2019-10-04

## 2019-09-04 RX ORDER — FLUTICASONE PROPIONATE 50 MCG
SPRAY, SUSPENSION (ML) NASAL
Refills: 5 | COMMUNITY
Start: 2019-09-01 | End: 2019-12-04

## 2019-09-04 RX ORDER — PREDNISONE 20 MG/1
TABLET ORAL
Refills: 1 | COMMUNITY
Start: 2019-08-02 | End: 2019-09-04

## 2019-09-04 RX ORDER — FOLIC ACID 1 MG/1
TABLET ORAL
Qty: 120 TABLET | Refills: 2 | Status: SHIPPED | OUTPATIENT
Start: 2019-09-04 | End: 2020-03-25

## 2019-09-04 RX ORDER — PREDNISONE 5 MG/1
TABLET ORAL
Refills: 2 | COMMUNITY
Start: 2019-08-28 | End: 2019-09-04 | Stop reason: SDUPTHER

## 2019-09-04 RX ORDER — MYCOPHENOLATE MOFETIL 500 MG/1
500 TABLET ORAL DAILY
Qty: 30 TABLET | Refills: 3 | Status: SHIPPED | OUTPATIENT
Start: 2019-09-04 | End: 2019-11-06 | Stop reason: SDUPTHER

## 2019-09-04 RX ORDER — PREDNISONE 5 MG/1
15 TABLET ORAL DAILY
Qty: 90 TABLET | Refills: 2 | Status: SHIPPED | OUTPATIENT
Start: 2019-09-04 | End: 2019-10-04

## 2019-09-04 ASSESSMENT — ROUTINE ASSESSMENT OF PATIENT INDEX DATA (RAPID3)
PAIN SCORE: 1
FATIGUE SCORE: 2
PATIENT GLOBAL ASSESSMENT SCORE: 1
MDHAQ FUNCTION SCORE: .1
TOTAL RAPID3 SCORE: .78
AM STIFFNESS SCORE: 0, NO
PSYCHOLOGICAL DISTRESS SCORE: 0

## 2019-09-04 NOTE — PROGRESS NOTES
58 year old female with dermatomyositis s/p PD1 inhibitor used for breast cancer  TIF1 gamma +  Low dose steroids didn't help  Rash+ muscle weakness+dysphagia   IVIG and steroids initial treatment  mtx causes LFT derangement,anemia and leukopenia     Recent white count 3.10  H/h nml  plts 124 stable   CK,aldolase nml    Now on prednisone 20 mg daily  Only on IVIG    IVIG lasts for 3 weeks only  We need to taper prednisone    Imuran not an option since she is intermediate metaboliser     Plan     cellcept 500 mg daily for 4 weeks  Rpt cbc in 4 weeks  Then titrate to 1000 mg   Rpt cbc in 8 weeks  See her at that time with CMP,CK,ALDOLASE,ESR,CRP  Prednisone taper 1 mg every 2 weeks     Continue IVIG,Talk to  : how long she can be on IVIG       Answers for HPI/ROS submitted by the patient on 8/30/2019   fever: No  eye redness: No  headaches: No  shortness of breath: No  chest pain: No  trouble swallowing: No  diarrhea: No  constipation: No  unexpected weight change: No  genital sore: No  dysuria: No  During the last 3 days, have you had a skin rash?: No  Bruises or bleeds easily: No  cough: No

## 2019-09-04 NOTE — PATIENT INSTRUCTIONS
Steroid tapering   Decrease 1 mg every 2 weeks - start with 19mg daily (can do 10mg in the AM and 9mg in the PM).     Start Cellcept 500mg every day.     Repeat CBC, CMP, CPK, Aldolase in 1 month   - contact me 5 days after lab completion to see if we can increase cellcept to 500mg twice a day    Repeat labs at in 2 months.    Continue Vit D 1000IU and Calcium 1200mg daily

## 2019-09-04 NOTE — PROGRESS NOTES
I have r/pat the documentation by  and I agree with the recommendations,made my addendum attached to her note

## 2019-09-04 NOTE — PROGRESS NOTES
Rapid3 Question Responses and Scores 8/30/2019   MDHAQ Score 0.1   Psychologic Score 0   Pain Score 1   When you awakened in the morning OVER THE LAST WEEK, did you feel stiff? No   If Yes, please indicate the number of hours until you are as limber as you will be for the day -   Fatigue Score 2   Global Health Score 1   RAPID3 Score 0.77       Answers for HPI/ROS submitted by the patient on 8/30/2019   fever: No  eye redness: No  headaches: No  shortness of breath: No  chest pain: No  trouble swallowing: No  diarrhea: No  constipation: No  unexpected weight change: No  genital sore: No  dysuria: No  During the last 3 days, have you had a skin rash?: No  Bruises or bleeds easily: No  cough: No

## 2019-09-05 ENCOUNTER — PATIENT MESSAGE (OUTPATIENT)
Dept: RHEUMATOLOGY | Facility: CLINIC | Age: 59
End: 2019-09-05

## 2019-09-05 ENCOUNTER — TELEPHONE (OUTPATIENT)
Dept: RHEUMATOLOGY | Facility: CLINIC | Age: 59
End: 2019-09-05

## 2019-09-05 DIAGNOSIS — M33.13 DERMATOMYOSITIS: Primary | ICD-10-CM

## 2019-09-05 DIAGNOSIS — M60.9 MYOSITIS, UNSPECIFIED MYOSITIS TYPE, UNSPECIFIED SITE: ICD-10-CM

## 2019-09-06 ENCOUNTER — TELEPHONE (OUTPATIENT)
Dept: RHEUMATOLOGY | Facility: CLINIC | Age: 59
End: 2019-09-06

## 2019-09-09 ENCOUNTER — INFUSION (OUTPATIENT)
Dept: INFUSION THERAPY | Facility: HOSPITAL | Age: 59
End: 2019-09-09
Attending: INTERNAL MEDICINE
Payer: COMMERCIAL

## 2019-09-09 VITALS
TEMPERATURE: 99 F | SYSTOLIC BLOOD PRESSURE: 171 MMHG | BODY MASS INDEX: 29.75 KG/M2 | HEART RATE: 82 BPM | WEIGHT: 178.56 LBS | HEIGHT: 65 IN | DIASTOLIC BLOOD PRESSURE: 89 MMHG | RESPIRATION RATE: 18 BRPM

## 2019-09-09 DIAGNOSIS — R13.19 ESOPHAGEAL DYSPHAGIA: ICD-10-CM

## 2019-09-09 DIAGNOSIS — M33.13 DERMATOMYOSITIS: Primary | ICD-10-CM

## 2019-09-09 PROCEDURE — 96365 THER/PROPH/DIAG IV INF INIT: CPT

## 2019-09-09 PROCEDURE — 25000003 PHARM REV CODE 250: Performed by: INTERNAL MEDICINE

## 2019-09-09 PROCEDURE — 63600175 PHARM REV CODE 636 W HCPCS: Performed by: INTERNAL MEDICINE

## 2019-09-09 PROCEDURE — S0028 INJECTION, FAMOTIDINE, 20 MG: HCPCS | Performed by: INTERNAL MEDICINE

## 2019-09-09 PROCEDURE — 96366 THER/PROPH/DIAG IV INF ADDON: CPT

## 2019-09-09 PROCEDURE — A4216 STERILE WATER/SALINE, 10 ML: HCPCS | Performed by: INTERNAL MEDICINE

## 2019-09-09 PROCEDURE — 96375 TX/PRO/DX INJ NEW DRUG ADDON: CPT

## 2019-09-09 PROCEDURE — 96367 TX/PROPH/DG ADDL SEQ IV INF: CPT

## 2019-09-09 RX ORDER — FAMOTIDINE 10 MG/ML
20 INJECTION INTRAVENOUS
Status: CANCELLED | OUTPATIENT
Start: 2019-09-30

## 2019-09-09 RX ORDER — ACETAMINOPHEN 325 MG/1
650 TABLET ORAL
Status: CANCELLED | OUTPATIENT
Start: 2019-09-30

## 2019-09-09 RX ORDER — HEPARIN 100 UNIT/ML
500 SYRINGE INTRAVENOUS
Status: DISCONTINUED | OUTPATIENT
Start: 2019-09-09 | End: 2019-09-09 | Stop reason: HOSPADM

## 2019-09-09 RX ORDER — ACETAMINOPHEN 325 MG/1
650 TABLET ORAL
Status: COMPLETED | OUTPATIENT
Start: 2019-09-09 | End: 2019-09-09

## 2019-09-09 RX ORDER — SODIUM CHLORIDE 0.9 % (FLUSH) 0.9 %
10 SYRINGE (ML) INJECTION
Status: DISCONTINUED | OUTPATIENT
Start: 2019-09-09 | End: 2019-09-09 | Stop reason: HOSPADM

## 2019-09-09 RX ORDER — HEPARIN 100 UNIT/ML
500 SYRINGE INTRAVENOUS
Status: CANCELLED | OUTPATIENT
Start: 2019-09-30

## 2019-09-09 RX ORDER — SODIUM CHLORIDE 0.9 % (FLUSH) 0.9 %
10 SYRINGE (ML) INJECTION
Status: CANCELLED | OUTPATIENT
Start: 2019-09-30

## 2019-09-09 RX ORDER — FAMOTIDINE 10 MG/ML
20 INJECTION INTRAVENOUS
Status: COMPLETED | OUTPATIENT
Start: 2019-09-09 | End: 2019-09-09

## 2019-09-09 RX ADMIN — ACETAMINOPHEN 650 MG: 325 TABLET ORAL at 08:09

## 2019-09-09 RX ADMIN — Medication 10 ML: at 12:09

## 2019-09-09 RX ADMIN — SODIUM CHLORIDE: 0.9 INJECTION, SOLUTION INTRAVENOUS at 08:09

## 2019-09-09 RX ADMIN — HEPARIN SODIUM (PORCINE) LOCK FLUSH IV SOLN 100 UNIT/ML 500 UNITS: 100 SOLUTION at 12:09

## 2019-09-09 RX ADMIN — DIPHENHYDRAMINE HYDROCHLORIDE 50 MG: 50 INJECTION, SOLUTION INTRAMUSCULAR; INTRAVENOUS at 08:09

## 2019-09-09 RX ADMIN — FAMOTIDINE 20 MG: 10 INJECTION INTRAVENOUS at 08:09

## 2019-09-09 RX ADMIN — HUMAN IMMUNOGLOBULIN G 80 G: 40 LIQUID INTRAVENOUS at 08:09

## 2019-09-09 NOTE — NURSING
800  Pt here for IVIG infusion, no new complaints or concerns, reports no SE/reaction to prior IVIG infusions; discussed treatment plan for today, all questions answered and pt agrees to proceed

## 2019-09-09 NOTE — PLAN OF CARE
Problem: Adult Inpatient Plan of Care  Goal: Plan of Care Review  Outcome: Ongoing (interventions implemented as appropriate)  Infusion completed, pt tolerated well; pt instructed to remain well hydrated; discussed when to contact MD, when to report to ER; AVS given to and reviewed with pt and spouse, both verbalized understanding of all discussed and when to report next

## 2019-09-10 ENCOUNTER — PATIENT MESSAGE (OUTPATIENT)
Dept: INTERNAL MEDICINE | Facility: CLINIC | Age: 59
End: 2019-09-10

## 2019-09-10 ENCOUNTER — INFUSION (OUTPATIENT)
Dept: INFUSION THERAPY | Facility: HOSPITAL | Age: 59
End: 2019-09-10
Attending: INTERNAL MEDICINE
Payer: COMMERCIAL

## 2019-09-10 VITALS
TEMPERATURE: 98 F | RESPIRATION RATE: 16 BRPM | DIASTOLIC BLOOD PRESSURE: 82 MMHG | HEART RATE: 80 BPM | SYSTOLIC BLOOD PRESSURE: 136 MMHG

## 2019-09-10 DIAGNOSIS — R13.19 ESOPHAGEAL DYSPHAGIA: ICD-10-CM

## 2019-09-10 DIAGNOSIS — M33.13 DERMATOMYOSITIS: Primary | ICD-10-CM

## 2019-09-10 PROCEDURE — 25000003 PHARM REV CODE 250: Performed by: INTERNAL MEDICINE

## 2019-09-10 PROCEDURE — 96375 TX/PRO/DX INJ NEW DRUG ADDON: CPT

## 2019-09-10 PROCEDURE — 63600175 PHARM REV CODE 636 W HCPCS: Performed by: INTERNAL MEDICINE

## 2019-09-10 PROCEDURE — 96367 TX/PROPH/DG ADDL SEQ IV INF: CPT

## 2019-09-10 PROCEDURE — 96365 THER/PROPH/DIAG IV INF INIT: CPT

## 2019-09-10 PROCEDURE — S0028 INJECTION, FAMOTIDINE, 20 MG: HCPCS | Performed by: INTERNAL MEDICINE

## 2019-09-10 PROCEDURE — 96366 THER/PROPH/DIAG IV INF ADDON: CPT

## 2019-09-10 RX ORDER — FAMOTIDINE 10 MG/ML
20 INJECTION INTRAVENOUS
Status: COMPLETED | OUTPATIENT
Start: 2019-09-10 | End: 2019-09-10

## 2019-09-10 RX ORDER — ACETAMINOPHEN 325 MG/1
650 TABLET ORAL
Status: CANCELLED | OUTPATIENT
Start: 2019-10-07

## 2019-09-10 RX ORDER — HEPARIN 100 UNIT/ML
500 SYRINGE INTRAVENOUS
Status: CANCELLED | OUTPATIENT
Start: 2019-10-07

## 2019-09-10 RX ORDER — ACETAMINOPHEN 325 MG/1
650 TABLET ORAL
Status: COMPLETED | OUTPATIENT
Start: 2019-09-10 | End: 2019-09-10

## 2019-09-10 RX ORDER — FAMOTIDINE 10 MG/ML
20 INJECTION INTRAVENOUS
Status: CANCELLED | OUTPATIENT
Start: 2019-10-07

## 2019-09-10 RX ORDER — SODIUM CHLORIDE 0.9 % (FLUSH) 0.9 %
10 SYRINGE (ML) INJECTION
Status: DISCONTINUED | OUTPATIENT
Start: 2019-09-10 | End: 2019-09-10 | Stop reason: HOSPADM

## 2019-09-10 RX ORDER — SODIUM CHLORIDE 0.9 % (FLUSH) 0.9 %
10 SYRINGE (ML) INJECTION
Status: CANCELLED | OUTPATIENT
Start: 2019-10-07

## 2019-09-10 RX ORDER — HEPARIN 100 UNIT/ML
500 SYRINGE INTRAVENOUS
Status: DISCONTINUED | OUTPATIENT
Start: 2019-09-10 | End: 2019-09-10 | Stop reason: HOSPADM

## 2019-09-10 RX ADMIN — HEPARIN SODIUM (PORCINE) LOCK FLUSH IV SOLN 100 UNIT/ML 500 UNITS: 100 SOLUTION at 12:09

## 2019-09-10 RX ADMIN — SODIUM CHLORIDE: 9 INJECTION, SOLUTION INTRAVENOUS at 08:09

## 2019-09-10 RX ADMIN — FAMOTIDINE 20 MG: 10 INJECTION INTRAVENOUS at 08:09

## 2019-09-10 RX ADMIN — DIPHENHYDRAMINE HYDROCHLORIDE 50 MG: 50 INJECTION, SOLUTION INTRAMUSCULAR; INTRAVENOUS at 08:09

## 2019-09-10 RX ADMIN — HUMAN IMMUNOGLOBULIN G 80 G: 40 LIQUID INTRAVENOUS at 08:09

## 2019-09-10 RX ADMIN — ACETAMINOPHEN 650 MG: 325 TABLET ORAL at 08:09

## 2019-09-10 NOTE — TELEPHONE ENCOUNTER
Mycophenolate does not require a PA.    Co-pay: $0     Patient Assistance is NOT required.     Forwarding to clinical pharmacist for consult and shipment.     BERT

## 2019-09-10 NOTE — PLAN OF CARE
Problem: Adult Inpatient Plan of Care  Goal: Plan of Care Review  Outcome: Ongoing (interventions implemented as appropriate)  1245: Patient tolerated IVIG infusion well, VSS, NAD noted, denies any new issues.  Port flushed per protocol and de-accessed.  AVS given.  Released in stable condition, ambulated off unit accompanied by .

## 2019-09-12 ENCOUNTER — PATIENT MESSAGE (OUTPATIENT)
Dept: RHEUMATOLOGY | Facility: CLINIC | Age: 59
End: 2019-09-12

## 2019-09-12 ENCOUNTER — TELEPHONE (OUTPATIENT)
Dept: RHEUMATOLOGY | Facility: CLINIC | Age: 59
End: 2019-09-12

## 2019-09-12 NOTE — TELEPHONE ENCOUNTER
Called and spoke with patient    Patient tired from day 2 of 6 hours of returning back to work.  Patient denies any myalgia.      Informed patient to take it easy and cut back on the number of hours and days.  Starting slowly.  She will update me with any new symptoms

## 2019-09-19 ENCOUNTER — TELEPHONE (OUTPATIENT)
Dept: PHARMACY | Facility: CLINIC | Age: 59
End: 2019-09-19

## 2019-09-19 NOTE — TELEPHONE ENCOUNTER
Cellcept initial consult / shipment complete on . Pt has Dermatopolymyositis, which causes her to have stiffness and pain in her legs and shoulder blades. Normally she was experiencing morning stiffness that lasted 20 minutes. However, changes in her routine now cause morning stiffness to last a full hour. Pt is going back to work after being out since 2018. Today is only her 4th day back to work, and she has missed 2 days since starting back. Adding activities such as getting dressed for work have caused some stiffness and swelling. Current pain is 3/10. Pt denies muscle weakness. She is trying to ease back into work, and hopes that Cellcept will help. She is also planning to wean off of prednisone after starting cellcept. We discussed the patient's dose as well as potential side effects with Cellcept. Pt understands to hold her dose if she becomes ill. [facility] services, hours of operation, on call pharmacist, refill procedure, and welcome packet reviewed. Pt informed that I will touch base 1 week after she starts the medication. Pt confirmed shipping of cellcept on  for delivery on . Pt address and  verified. $0.00 copay in 004. Pt had no further questions or concerns.

## 2019-09-23 ENCOUNTER — PATIENT MESSAGE (OUTPATIENT)
Dept: RHEUMATOLOGY | Facility: CLINIC | Age: 59
End: 2019-09-23

## 2019-09-25 ENCOUNTER — OFFICE VISIT (OUTPATIENT)
Dept: HEMATOLOGY/ONCOLOGY | Facility: CLINIC | Age: 59
End: 2019-09-25
Payer: COMMERCIAL

## 2019-09-25 ENCOUNTER — HOSPITAL ENCOUNTER (OUTPATIENT)
Dept: RADIOLOGY | Facility: HOSPITAL | Age: 59
Discharge: HOME | End: 2019-09-25
Attending: INTERNAL MEDICINE
Payer: COMMERCIAL

## 2019-09-25 ENCOUNTER — PATIENT MESSAGE (OUTPATIENT)
Dept: HEMATOLOGY/ONCOLOGY | Facility: CLINIC | Age: 59
End: 2019-09-25

## 2019-09-25 VITALS
SYSTOLIC BLOOD PRESSURE: 149 MMHG | RESPIRATION RATE: 20 BRPM | OXYGEN SATURATION: 99 % | HEART RATE: 80 BPM | WEIGHT: 183.19 LBS | DIASTOLIC BLOOD PRESSURE: 90 MMHG | BODY MASS INDEX: 30.49 KG/M2

## 2019-09-25 DIAGNOSIS — C77.9 REGIONAL LYMPH NODE METASTASIS PRESENT: ICD-10-CM

## 2019-09-25 DIAGNOSIS — C50.412 MALIGNANT NEOPLASM OF UPPER-OUTER QUADRANT OF LEFT BREAST IN FEMALE, ESTROGEN RECEPTOR NEGATIVE: Primary | Chronic | ICD-10-CM

## 2019-09-25 DIAGNOSIS — C50.412 MALIGNANT NEOPLASM OF UPPER-OUTER QUADRANT OF LEFT BREAST IN FEMALE, ESTROGEN RECEPTOR NEGATIVE: ICD-10-CM

## 2019-09-25 DIAGNOSIS — Z17.1 MALIGNANT NEOPLASM OF UPPER-OUTER QUADRANT OF LEFT BREAST IN FEMALE, ESTROGEN RECEPTOR NEGATIVE: Primary | Chronic | ICD-10-CM

## 2019-09-25 DIAGNOSIS — Z17.1 MALIGNANT NEOPLASM OF UPPER-OUTER QUADRANT OF LEFT BREAST IN FEMALE, ESTROGEN RECEPTOR NEGATIVE: ICD-10-CM

## 2019-09-25 LAB — POCT GLUCOSE: 68 MG/DL (ref 70–110)

## 2019-09-25 PROCEDURE — 3008F PR BODY MASS INDEX (BMI) DOCUMENTED: ICD-10-PCS | Mod: CPTII,S$GLB,, | Performed by: INTERNAL MEDICINE

## 2019-09-25 PROCEDURE — 78815 PET IMAGE W/CT SKULL-THIGH: CPT | Mod: TC

## 2019-09-25 PROCEDURE — 3080F PR MOST RECENT DIASTOLIC BLOOD PRESSURE >= 90 MM HG: ICD-10-PCS | Mod: CPTII,S$GLB,, | Performed by: INTERNAL MEDICINE

## 2019-09-25 PROCEDURE — 78815 PET IMAGE W/CT SKULL-THIGH: CPT | Mod: 26,PS,, | Performed by: RADIOLOGY

## 2019-09-25 PROCEDURE — 99213 OFFICE O/P EST LOW 20 MIN: CPT | Mod: S$GLB,,, | Performed by: INTERNAL MEDICINE

## 2019-09-25 PROCEDURE — 3077F PR MOST RECENT SYSTOLIC BLOOD PRESSURE >= 140 MM HG: ICD-10-PCS | Mod: CPTII,S$GLB,, | Performed by: INTERNAL MEDICINE

## 2019-09-25 PROCEDURE — 99213 PR OFFICE/OUTPT VISIT, EST, LEVL III, 20-29 MIN: ICD-10-PCS | Mod: S$GLB,,, | Performed by: INTERNAL MEDICINE

## 2019-09-25 PROCEDURE — 3080F DIAST BP >= 90 MM HG: CPT | Mod: CPTII,S$GLB,, | Performed by: INTERNAL MEDICINE

## 2019-09-25 PROCEDURE — 3008F BODY MASS INDEX DOCD: CPT | Mod: CPTII,S$GLB,, | Performed by: INTERNAL MEDICINE

## 2019-09-25 PROCEDURE — 99999 PR PBB SHADOW E&M-EST. PATIENT-LVL III: ICD-10-PCS | Mod: PBBFAC,,, | Performed by: INTERNAL MEDICINE

## 2019-09-25 PROCEDURE — 99999 PR PBB SHADOW E&M-EST. PATIENT-LVL III: CPT | Mod: PBBFAC,,, | Performed by: INTERNAL MEDICINE

## 2019-09-25 PROCEDURE — 3077F SYST BP >= 140 MM HG: CPT | Mod: CPTII,S$GLB,, | Performed by: INTERNAL MEDICINE

## 2019-09-25 PROCEDURE — A9552 F18 FDG: HCPCS

## 2019-09-25 PROCEDURE — 78815 NM PET CT ROUTINE: ICD-10-PCS | Mod: 26,PS,, | Performed by: RADIOLOGY

## 2019-09-25 RX ORDER — CHOLECALCIFEROL (VITAMIN D3) 25 MCG
1000 TABLET ORAL DAILY
COMMUNITY

## 2019-09-25 RX ORDER — CALCIUM CARBONATE 600 MG
600 TABLET ORAL DAILY
COMMUNITY

## 2019-09-25 NOTE — PROGRESS NOTES
Subjective:       Patient ID: Ermelinda Verde is a 58 y.o. female.    Chief Complaint: No chief complaint on file.    HPI   Mrs Verde is  a very pleasant 58-year-old -American female who returns for a diagnosis of recurrent triple negative left breast cancer.    She is S/P 3 cycles of nab-paclitaxel and Atezolizumab (last treatment in January 2019).    Her treatment was complicated by the development of dermatomyositis which resulted in the lengthy hospitalization from January 22nd to February 13th.  She is followed by Rheumatology and has been treated with steroids, IVIG, and low-dose methotrexate.    Her last IVIG was on the 9th and 10th.  She started on CellCept recently and continues on prednisone 60 mg a day on a slow taper.  Her major symptoms are some pain in the muscles of her shoulders, less so in her hips.  She also has some raspiness of her voice and some throat congestion at times.  She seems to be swallowing well. Bowel function has been good.  She denies any shortness of breath        Onc History:  She developed a palpable abnormality in her left breast in January 2017 which she noted on self-examination.  A diagnostic mammogram on January 19 showed a greater than 1 cm nodule in the upper outer portion of left breast.  By ultrasound this was lobulated and hypoechoic measuring 1.75 x 1.51 x 1.96 cm.     On January 24, 2017 a core needle biopsy was performed which showed infiltrating ductal carcinoma, high grade.  The tumor was ER negative, MA negative, and HER-2 negative.  A follow-up ultrasound on December 6 showed 2.5 x 2.2 x 1.5 cm left breast mass.  There was no abnormality noted in the left axilla.     She underwent sentinel lymph node biopsy on February 22.  That showed 4 negative lymph nodes.     She had 4 cycles of  Wilbur-adjuvantTaxotere and Cytoxan completed  on 5/9/17.     On June 26 she underwent left mastectomy.  That revealed 2 foci of invasive high-grade carcinoma measuring 14  mm and 1.5 mm.  Margins were negative.    She completed 4 cycles of adjuvant Adriamycin on September 12, 2017.      In October 2018, she developed some left supraclavicular lymphadenopathy which turned out to be recurrence.     A fine-needle aspirate of the lymph node was performed on October 19th.  That showed metastatic carcinoma consistent with breast primary which was ER negative, SD negative and HER 2-negative.    She then received 3 cycles of Nab paclitaxel and atezolizumab.  She last received treatment on January 3, 2019.    PET-CT in March showed complete response.    She completed consolidation radiation therapy in May 2019.  Review of Systems   Constitutional: Positive for fatigue. Negative for appetite change and unexpected weight change.   Eyes: Negative for visual disturbance.   Respiratory: Negative for cough and shortness of breath.    Cardiovascular: Negative for chest pain.   Gastrointestinal: Negative for abdominal pain and diarrhea.   Genitourinary: Negative for frequency.   Musculoskeletal: Negative for back pain.        Shoulder and hip pain   Skin: Negative for rash.   Neurological: Positive for weakness (Mild). Negative for headaches.   Hematological: Negative for adenopathy.   Psychiatric/Behavioral: The patient is not nervous/anxious.        Objective:      Physical Exam   Constitutional: She is oriented to person, place, and time. She appears well-developed and well-nourished. No distress.   HENT:   Mouth/Throat: No oropharyngeal exudate.   Cardiovascular: Normal rate and regular rhythm.   Pulmonary/Chest: Effort normal and breath sounds normal. Right breast exhibits no mass, no nipple discharge and no skin change.       Abdominal: Soft. She exhibits no mass. There is no tenderness.   Lymphadenopathy:     She has no cervical adenopathy.   Neurological: She is alert and oriented to person, place, and time.   Psychiatric: She has a normal mood and affect. Her behavior is normal. Thought  content normal.   Vitals reviewed.      Assessment:     PET-CT -no evidence of recurrence  1. Malignant neoplasm of upper-outer quadrant of left breast in female, estrogen receptor negative    2. Regional lymph node metastasis present        Plan:       Return to clinic in 3 months time with follow-up imaging and blood work.

## 2019-10-03 ENCOUNTER — PATIENT MESSAGE (OUTPATIENT)
Dept: RHEUMATOLOGY | Facility: CLINIC | Age: 59
End: 2019-10-03

## 2019-10-09 ENCOUNTER — PATIENT MESSAGE (OUTPATIENT)
Dept: RHEUMATOLOGY | Facility: CLINIC | Age: 59
End: 2019-10-09

## 2019-10-10 ENCOUNTER — INFUSION (OUTPATIENT)
Dept: INFUSION THERAPY | Facility: HOSPITAL | Age: 59
End: 2019-10-10
Attending: INTERNAL MEDICINE
Payer: COMMERCIAL

## 2019-10-10 VITALS
SYSTOLIC BLOOD PRESSURE: 160 MMHG | HEART RATE: 89 BPM | OXYGEN SATURATION: 99 % | DIASTOLIC BLOOD PRESSURE: 88 MMHG | RESPIRATION RATE: 17 BRPM | HEIGHT: 65 IN | BODY MASS INDEX: 30.78 KG/M2 | TEMPERATURE: 98 F | WEIGHT: 184.75 LBS

## 2019-10-10 DIAGNOSIS — M33.13 DERMATOMYOSITIS: Primary | ICD-10-CM

## 2019-10-10 DIAGNOSIS — R13.19 ESOPHAGEAL DYSPHAGIA: ICD-10-CM

## 2019-10-10 PROCEDURE — 96375 TX/PRO/DX INJ NEW DRUG ADDON: CPT

## 2019-10-10 PROCEDURE — S0028 INJECTION, FAMOTIDINE, 20 MG: HCPCS | Performed by: INTERNAL MEDICINE

## 2019-10-10 PROCEDURE — 96365 THER/PROPH/DIAG IV INF INIT: CPT

## 2019-10-10 PROCEDURE — 96366 THER/PROPH/DIAG IV INF ADDON: CPT

## 2019-10-10 PROCEDURE — A4216 STERILE WATER/SALINE, 10 ML: HCPCS | Performed by: INTERNAL MEDICINE

## 2019-10-10 PROCEDURE — 96367 TX/PROPH/DG ADDL SEQ IV INF: CPT

## 2019-10-10 PROCEDURE — 63600175 PHARM REV CODE 636 W HCPCS: Mod: JG | Performed by: INTERNAL MEDICINE

## 2019-10-10 PROCEDURE — 25000003 PHARM REV CODE 250: Performed by: INTERNAL MEDICINE

## 2019-10-10 RX ORDER — SODIUM CHLORIDE 0.9 % (FLUSH) 0.9 %
10 SYRINGE (ML) INJECTION
Status: CANCELLED | OUTPATIENT
Start: 2019-11-07

## 2019-10-10 RX ORDER — HEPARIN 100 UNIT/ML
500 SYRINGE INTRAVENOUS
Status: CANCELLED | OUTPATIENT
Start: 2019-11-07

## 2019-10-10 RX ORDER — FAMOTIDINE 10 MG/ML
20 INJECTION INTRAVENOUS
Status: COMPLETED | OUTPATIENT
Start: 2019-10-10 | End: 2019-10-10

## 2019-10-10 RX ORDER — SODIUM CHLORIDE 0.9 % (FLUSH) 0.9 %
10 SYRINGE (ML) INJECTION
Status: DISCONTINUED | OUTPATIENT
Start: 2019-10-10 | End: 2019-10-10 | Stop reason: HOSPADM

## 2019-10-10 RX ORDER — FAMOTIDINE 10 MG/ML
20 INJECTION INTRAVENOUS
Status: CANCELLED | OUTPATIENT
Start: 2019-11-07

## 2019-10-10 RX ORDER — HEPARIN 100 UNIT/ML
500 SYRINGE INTRAVENOUS
Status: DISCONTINUED | OUTPATIENT
Start: 2019-10-10 | End: 2019-10-10 | Stop reason: HOSPADM

## 2019-10-10 RX ORDER — ACETAMINOPHEN 325 MG/1
650 TABLET ORAL
Status: CANCELLED | OUTPATIENT
Start: 2019-11-07

## 2019-10-10 RX ORDER — ACETAMINOPHEN 325 MG/1
650 TABLET ORAL
Status: COMPLETED | OUTPATIENT
Start: 2019-10-10 | End: 2019-10-10

## 2019-10-10 RX ADMIN — FAMOTIDINE 20 MG: 10 INJECTION INTRAVENOUS at 08:10

## 2019-10-10 RX ADMIN — Medication 10 ML: at 12:10

## 2019-10-10 RX ADMIN — HUMAN IMMUNOGLOBULIN G 85 G: 40 LIQUID INTRAVENOUS at 08:10

## 2019-10-10 RX ADMIN — DIPHENHYDRAMINE HYDROCHLORIDE 50 MG: 50 INJECTION, SOLUTION INTRAMUSCULAR; INTRAVENOUS at 08:10

## 2019-10-10 RX ADMIN — ACETAMINOPHEN 650 MG: 325 TABLET ORAL at 08:10

## 2019-10-10 RX ADMIN — HEPARIN SODIUM (PORCINE) LOCK FLUSH IV SOLN 100 UNIT/ML 500 UNITS: 100 SOLUTION at 12:10

## 2019-10-10 RX ADMIN — SODIUM CHLORIDE: 9 INJECTION, SOLUTION INTRAVENOUS at 07:10

## 2019-10-10 NOTE — PLAN OF CARE
Pt tolerated IVIG infusion well, with no complications or s/s of adverse reaction. VS stable throughout infusion. Right chest port positive for blood return, flushed with normal saline and heparin, secured, and left in place at time of discharge for treatment tomorrow 10/11/19. Pt discharged with no distress noted, ambulating independently, accompanied by . AVS printed and given to pt.

## 2019-10-11 ENCOUNTER — INFUSION (OUTPATIENT)
Dept: INFUSION THERAPY | Facility: HOSPITAL | Age: 59
End: 2019-10-11
Attending: INTERNAL MEDICINE
Payer: COMMERCIAL

## 2019-10-11 ENCOUNTER — OFFICE VISIT (OUTPATIENT)
Dept: INTERNAL MEDICINE | Facility: CLINIC | Age: 59
End: 2019-10-11
Payer: COMMERCIAL

## 2019-10-11 VITALS
DIASTOLIC BLOOD PRESSURE: 92 MMHG | SYSTOLIC BLOOD PRESSURE: 154 MMHG | BODY MASS INDEX: 30.89 KG/M2 | HEART RATE: 94 BPM | WEIGHT: 185.44 LBS | HEIGHT: 65 IN | OXYGEN SATURATION: 99 %

## 2019-10-11 VITALS
SYSTOLIC BLOOD PRESSURE: 177 MMHG | BODY MASS INDEX: 30.81 KG/M2 | RESPIRATION RATE: 18 BRPM | HEART RATE: 86 BPM | DIASTOLIC BLOOD PRESSURE: 109 MMHG | HEIGHT: 65 IN | OXYGEN SATURATION: 100 % | TEMPERATURE: 99 F | WEIGHT: 184.94 LBS

## 2019-10-11 DIAGNOSIS — M33.13 DERMATOMYOSITIS: Primary | ICD-10-CM

## 2019-10-11 DIAGNOSIS — R13.19 ESOPHAGEAL DYSPHAGIA: ICD-10-CM

## 2019-10-11 DIAGNOSIS — I16.0 HYPERTENSIVE URGENCY: Primary | ICD-10-CM

## 2019-10-11 PROCEDURE — S0028 INJECTION, FAMOTIDINE, 20 MG: HCPCS | Performed by: INTERNAL MEDICINE

## 2019-10-11 PROCEDURE — 96365 THER/PROPH/DIAG IV INF INIT: CPT

## 2019-10-11 PROCEDURE — 3077F PR MOST RECENT SYSTOLIC BLOOD PRESSURE >= 140 MM HG: ICD-10-PCS | Mod: CPTII,S$GLB,, | Performed by: STUDENT IN AN ORGANIZED HEALTH CARE EDUCATION/TRAINING PROGRAM

## 2019-10-11 PROCEDURE — A4216 STERILE WATER/SALINE, 10 ML: HCPCS | Performed by: INTERNAL MEDICINE

## 2019-10-11 PROCEDURE — 3008F BODY MASS INDEX DOCD: CPT | Mod: CPTII,S$GLB,, | Performed by: STUDENT IN AN ORGANIZED HEALTH CARE EDUCATION/TRAINING PROGRAM

## 2019-10-11 PROCEDURE — 99214 PR OFFICE/OUTPT VISIT, EST, LEVL IV, 30-39 MIN: ICD-10-PCS | Mod: S$GLB,,, | Performed by: STUDENT IN AN ORGANIZED HEALTH CARE EDUCATION/TRAINING PROGRAM

## 2019-10-11 PROCEDURE — 3008F PR BODY MASS INDEX (BMI) DOCUMENTED: ICD-10-PCS | Mod: CPTII,S$GLB,, | Performed by: STUDENT IN AN ORGANIZED HEALTH CARE EDUCATION/TRAINING PROGRAM

## 2019-10-11 PROCEDURE — 3078F DIAST BP <80 MM HG: CPT | Mod: CPTII,S$GLB,, | Performed by: STUDENT IN AN ORGANIZED HEALTH CARE EDUCATION/TRAINING PROGRAM

## 2019-10-11 PROCEDURE — 99214 OFFICE O/P EST MOD 30 MIN: CPT | Mod: S$GLB,,, | Performed by: STUDENT IN AN ORGANIZED HEALTH CARE EDUCATION/TRAINING PROGRAM

## 2019-10-11 PROCEDURE — 63600175 PHARM REV CODE 636 W HCPCS: Performed by: INTERNAL MEDICINE

## 2019-10-11 PROCEDURE — 3078F PR MOST RECENT DIASTOLIC BLOOD PRESSURE < 80 MM HG: ICD-10-PCS | Mod: CPTII,S$GLB,, | Performed by: STUDENT IN AN ORGANIZED HEALTH CARE EDUCATION/TRAINING PROGRAM

## 2019-10-11 PROCEDURE — 3077F SYST BP >= 140 MM HG: CPT | Mod: CPTII,S$GLB,, | Performed by: STUDENT IN AN ORGANIZED HEALTH CARE EDUCATION/TRAINING PROGRAM

## 2019-10-11 PROCEDURE — 96375 TX/PRO/DX INJ NEW DRUG ADDON: CPT

## 2019-10-11 PROCEDURE — 99999 PR PBB SHADOW E&M-EST. PATIENT-LVL III: CPT | Mod: PBBFAC,,, | Performed by: STUDENT IN AN ORGANIZED HEALTH CARE EDUCATION/TRAINING PROGRAM

## 2019-10-11 PROCEDURE — 96366 THER/PROPH/DIAG IV INF ADDON: CPT

## 2019-10-11 PROCEDURE — 25000003 PHARM REV CODE 250: Performed by: INTERNAL MEDICINE

## 2019-10-11 PROCEDURE — 99999 PR PBB SHADOW E&M-EST. PATIENT-LVL III: ICD-10-PCS | Mod: PBBFAC,,, | Performed by: STUDENT IN AN ORGANIZED HEALTH CARE EDUCATION/TRAINING PROGRAM

## 2019-10-11 PROCEDURE — 96367 TX/PROPH/DG ADDL SEQ IV INF: CPT

## 2019-10-11 RX ORDER — FAMOTIDINE 10 MG/ML
20 INJECTION INTRAVENOUS
Status: CANCELLED | OUTPATIENT
Start: 2019-11-07

## 2019-10-11 RX ORDER — SODIUM CHLORIDE 0.9 % (FLUSH) 0.9 %
10 SYRINGE (ML) INJECTION
Status: DISCONTINUED | OUTPATIENT
Start: 2019-10-11 | End: 2019-10-11 | Stop reason: HOSPADM

## 2019-10-11 RX ORDER — ACETAMINOPHEN 325 MG/1
650 TABLET ORAL
Status: CANCELLED | OUTPATIENT
Start: 2019-11-07

## 2019-10-11 RX ORDER — FAMOTIDINE 10 MG/ML
20 INJECTION INTRAVENOUS
Status: COMPLETED | OUTPATIENT
Start: 2019-10-11 | End: 2019-10-11

## 2019-10-11 RX ORDER — AMLODIPINE BESYLATE 5 MG/1
10 TABLET ORAL DAILY
Qty: 60 TABLET | Refills: 11 | Status: SHIPPED | OUTPATIENT
Start: 2019-10-11 | End: 2020-01-27

## 2019-10-11 RX ORDER — ACETAMINOPHEN 325 MG/1
650 TABLET ORAL
Status: COMPLETED | OUTPATIENT
Start: 2019-10-11 | End: 2019-10-11

## 2019-10-11 RX ORDER — HEPARIN 100 UNIT/ML
500 SYRINGE INTRAVENOUS
Status: CANCELLED | OUTPATIENT
Start: 2019-11-07

## 2019-10-11 RX ORDER — HEPARIN 100 UNIT/ML
500 SYRINGE INTRAVENOUS
Status: DISCONTINUED | OUTPATIENT
Start: 2019-10-11 | End: 2019-10-11 | Stop reason: HOSPADM

## 2019-10-11 RX ORDER — SODIUM CHLORIDE 0.9 % (FLUSH) 0.9 %
10 SYRINGE (ML) INJECTION
Status: CANCELLED | OUTPATIENT
Start: 2019-11-07

## 2019-10-11 RX ADMIN — SODIUM CHLORIDE: 0.9 INJECTION, SOLUTION INTRAVENOUS at 07:10

## 2019-10-11 RX ADMIN — HEPARIN 500 UNITS: 100 SYRINGE at 12:10

## 2019-10-11 RX ADMIN — DIPHENHYDRAMINE HYDROCHLORIDE 50 MG: 50 INJECTION, SOLUTION INTRAMUSCULAR; INTRAVENOUS at 07:10

## 2019-10-11 RX ADMIN — ACETAMINOPHEN 650 MG: 325 TABLET ORAL at 07:10

## 2019-10-11 RX ADMIN — FAMOTIDINE 20 MG: 10 INJECTION INTRAVENOUS at 07:10

## 2019-10-11 RX ADMIN — Medication 10 ML: at 12:10

## 2019-10-11 RX ADMIN — HUMAN IMMUNOGLOBULIN G 85 G: 40 LIQUID INTRAVENOUS at 08:10

## 2019-10-11 NOTE — PLAN OF CARE
Pt tolerated IVIG without adverse effects but BP returned to same elevated level that it was upon arrival.  Pt states BP meds dc in Jan due to low BP.  Notified Dr Campos- send to ED.  Pt instructed to go to ED due to elevated BP & f/u with primary not until next week.  Pt also c/o lightheadedness.   Provided AVS & verbalized understanding of RTC date. DC with family ambulating independently to ED

## 2019-10-11 NOTE — PROGRESS NOTES
I have reviewed and concur with the resident's history, physical, assessment, and plan.  I have personally interviewed and examined the patient    at bedside.  See below addendum for my evaluation and additional findings.  Pt. Seen for urgent care visit today in residency clinic for evaluation of elevated blood pressure which was noted while pt. Was receiving IVG infusion today.  Pt. With history of breast cancer and chemo induced dermatomyositis who had temporality discontinued her BP meds due to episodes of hypotension while in the hospital. Pt. Will resume her norvasc 10mg and pt. Is a good candidate for digital hypertension clinic.

## 2019-10-11 NOTE — PROGRESS NOTES
"Clinic Note  10/11/2019      Subjective:       Patient ID:  Ermelinda is a 58 y.o. female being seen for an urgent care visit    Chief Complaint: Hypertension    Patient is a 58 year old female with HTN, breast cancer, dermatomyositis secondary to immunotherapy (on IVIG, prednisone, methotrexate) who was sent to the clinic from infusion for elevated blood pressure after IVIG infusion this morning. Patient reports that she began her infusion this morning around 7:30am, and after completion of the infusion, the nurse noted that her pressures were as high as 170s/110s. During this time, she did not have any headache, palpitations, vision changes, slurred speech, chest pain, or shortness of breath. She was kept for observation for several hours during which pressure did not significantly improve and was sent to the clinic for evaluation. On arrival, patient was ambulatory and BP was 154/92. Of note, she had previously been on amlodipine for years for her blood pressure but was told not to take it anymore after a hospitalization earlier this year during which she had some episodes of hypotension. Pt takes her BP at home intermittently and reports that for the past few months it "has been high", with reads around 150s-170 systolics.     She has also had labs recently which showed that liver and renal function were wnl.      Review of Systems   Constitutional: Negative for chills, fever, malaise/fatigue and weight loss.   HENT: Negative for ear pain and hearing loss.    Eyes: Negative for blurred vision, double vision, photophobia and pain.   Respiratory: Negative for cough and shortness of breath.    Cardiovascular: Negative for chest pain, palpitations, orthopnea and leg swelling.   Gastrointestinal: Negative for nausea and vomiting.   Genitourinary: Negative for dysuria, flank pain and hematuria.   Musculoskeletal: Negative for back pain, falls, myalgias and neck pain.   Neurological: Negative for dizziness, tingling, " tremors, loss of consciousness, weakness and headaches.       Medication List with Changes/Refills   New Medications    AMLODIPINE (NORVASC) 5 MG TABLET    Take 2 tablets (10 mg total) by mouth once daily.   Current Medications    ACETAMINOPHEN (TYLENOL) 325 MG TABLET    650 mg by Tube route every 4 (four) hours as needed for Pain.    CALCIUM CARBONATE (OS-ESTELA) 600 MG CALCIUM (1,500 MG) TAB    Take 600 mg by mouth 2 (two) times daily with meals.    FERROUS SULFATE (FEOSOL) 325 MG (65 MG IRON) TAB TABLET    Take 1 tablet (325 mg total) by mouth every 12 (twelve) hours.    FLUTICASONE PROPIONATE (FLONASE) 50 MCG/ACTUATION NASAL SPRAY        FOLIC ACID (FOLVITE) 1 MG TABLET    TAKE 4 TABLETS(4 MG) VIA GTUBE EVERY DAY    MYCOPHENOLATE (CELLCEPT) 500 MG TAB    Take 1 tablet (500 mg total) by mouth once daily.    VITAMIN D (VITAMIN D3) 1000 UNITS TAB    Take 1,000 Units by mouth once daily.       Patient Active Problem List   Diagnosis    Breast cancer of upper-outer quadrant of left female breast    Encounter for antineoplastic chemotherapy    Adjustment disorder with depressed mood    Pre-diabetes    Regional lymph node metastasis present    Breast cancer in female    Dysphagia    Dermatomyositis    Polymyositis associated with autoimmune disease    Congestion of upper airway    Moderate malnutrition    Impaired functional mobility and endurance    Anemia    Immunosuppression due to drug therapy    Erosive esophagitis    Gastro-esophageal reflux disease with esophagitis    Chemotherapy-induced neuropathy    Hepatic cyst    Colon cancer screening             Health Maintenance Topics with due status: Not Due       Topic Last Completion Date    TETANUS VACCINE 03/08/2018    Pap Smear with HPV Cotest 04/12/2018    Mammogram 11/13/2018    Lipid Panel 04/04/2019    Colonoscopy 07/29/2019       Objective:      BP (!) 154/92 (BP Location: Right arm, Patient Position: Sitting, BP Method: Large (Manual))    "Pulse 94   Ht 5' 5" (1.651 m)   Wt 84.1 kg (185 lb 6.5 oz)   LMP  (LMP Unknown)   SpO2 99%   BMI 30.85 kg/m²   Estimated body mass index is 30.85 kg/m² as calculated from the following:    Height as of this encounter: 5' 5" (1.651 m).    Weight as of this encounter: 84.1 kg (185 lb 6.5 oz).  Physical Exam   Constitutional: She is oriented to person, place, and time and well-developed, well-nourished, and in no distress. No distress.   HENT:   Head: Normocephalic and atraumatic.   Eyes: Pupils are equal, round, and reactive to light. Conjunctivae and EOM are normal.   Neck: Normal range of motion. Neck supple. No JVD present.   Cardiovascular: Normal rate, regular rhythm, normal heart sounds and intact distal pulses.   No murmur heard.  Pulmonary/Chest: Breath sounds normal. No respiratory distress. She has no wheezes.   Abdominal: Soft. Bowel sounds are normal. She exhibits no distension. There is no tenderness.   Musculoskeletal: Normal range of motion. She exhibits no edema, tenderness or deformity.   Neurological: She is alert and oriented to person, place, and time. No cranial nerve deficit. Gait normal. GCS score is 15.   Skin: Skin is warm and dry. Rash noted. She is not diaphoretic.   Nursing note and vitals reviewed.        Assessment and Plan:     #hypertensive urgency, asymptomatic  -previously on amlodipine 10mg, d/c during hospital admission for dermatomyositis in February  -BPs have been elevated at home  -will restart amlodipine 10mg qd, with instructions to keep BP journal, taking twice per day    Spent greater than 45 minutes spent on medical decision making in addition to review of patient's history, vitals, labs and prior encounter notes.      Problem List Items Addressed This Visit     None          Follow Up:   No follow-ups on file.        Madhu Koch M.D.  Internal Medicine PGY-1  [facility]      "

## 2019-10-14 ENCOUNTER — TELEPHONE (OUTPATIENT)
Dept: RHEUMATOLOGY | Facility: CLINIC | Age: 59
End: 2019-10-14

## 2019-10-14 NOTE — TELEPHONE ENCOUNTER
Patient expressed that she is unable to tolerate returning back to work despite decreasing work load and number of hours.  Still feels very weak.     Letter provided to patient about disability.      Letter will be picked up by .

## 2019-10-14 NOTE — LETTER
Pt Name: Ermelinda Verde  YOB: 1960   Employer: [employer] to [id number]  Job Title:         To Whom It May Concern,     Ms Verde is currently under my care for rheumatological condition.  Patient had been progressing well and tried to return back to work.  After multiple attempts to decrease the number of working hours and work load, it was deemed that patient had not fully recover yet.  Patient is unable to perform the normal activities of her job requirement.  At this time, patient requires more time for recovery before attempting to return back to work at this time.      If you have any questions, please do not hesitate to contact me at [phone].  Thank you         Provider Signature/Printed Name:Clarissa Swift MD Date:10/14/2019     [facility]   [address]  [place] [address]

## 2019-10-15 ENCOUNTER — TELEPHONE (OUTPATIENT)
Dept: SURGERY | Facility: CLINIC | Age: 59
End: 2019-10-15

## 2019-10-15 DIAGNOSIS — Z12.31 BREAST CANCER SCREENING BY MAMMOGRAM: Primary | ICD-10-CM

## 2019-10-15 NOTE — TELEPHONE ENCOUNTER
Contacted the patient regarding scheduling follow up breast exam and mammogram with .  The patient is scheduled to be seen on Tuesday 11/26/19 mammogram 1:15 pm and 2 pm breast exam.  The patient voiced understanding of appointment date and times.  Reminder letter mailed to the patient.

## 2019-11-02 ENCOUNTER — PATIENT OUTREACH (OUTPATIENT)
Dept: ADMINISTRATIVE | Facility: OTHER | Age: 59
End: 2019-11-02

## 2019-11-05 NOTE — PROGRESS NOTES
RHEUMATOLOGY CLINIC FOLLOW UP VISIT    Chief complaints:- Myositis       HPI:-  Ermelinda Javed a 58 y.o.F with history of L sided infiltrating ductal carcinoma of the L breast (dx on Jan 2017), HTN, depression, gastritis, and recently diagnosed myositis (uncertain if it's autoimmune vs medication induced), present today with concern about myositis flare    Patient was initially send from hem/onc clinic for evaluation of possible inflammatory myositis.  Patient was hospitalized from 1/22/19 till 2/13/19 for myositis.      L breast cancer s/p completion of 4 cycles of deim-adjuvant taxotere and cytoxan (completed on 5/9/17) and mastectomy (6/26/17) and then 4 cycles of adjuvant Adriamycin (completed 9/12/17). In 10/2017 - patient developed L supraclavicular lymphadenopathy which FNA confirmed as reoccurrence of previously treated breast cancer.      Patient started on Atelizumab/Abraxane on 11/7/18. Per chart review, patient noticed rashes on the dorsum of her hands on 11/11/18. Seen in urgent care and given topical steroid cream. Then received two more infusions on Atelizumab/Abraxane on 11/21/18 and 12/5/18. Started to notice puffiness around the eyes on 12/11/18. Received another Abraxane infusion on 12/12/18 but this time with hydrocortisone 50 mg which helped reduce the swelling around the eyes. Developed swelling of the face again on 12/15/18. Next infusion of Abraxane done on 12/19/18 with Solucortef and Atelizumab held. Abraxane given again on 1/3/19 with no IV steroid. Patient developed swelling of the face on 1/4/19 and went to urgent care and given short course of prednisone 20 mg BID. On 1/15/19, patient seen in hem/onc clinic with c/o of pressure and tightness around neck. CT scan of chest and neck did not reveal any vascular compression. Started on prednisone 60 mg with taper. On 1/22/18 patient with c/o proximal muscle weakness. CPK found to be elevated around 4k. Patient  admitted and given solumedrol 80 mg IV on 1/22/18. Last infusion of Atelizumab on 12/19/18. Last infusion of Abraxane on 1/3/19. Given Solumedrol 1g x 1 on 1/23/18. Seen by Dermatology on 1/24/18 and biopsy done of skin rash. Given distribution of rashes, there was concern for dermatomyositis. Patient started on Solumedrol 125 mg IV BID at this time.     During her hospitalization patient was started on high dose steroid Solumedrol 80mg IV x 1, 1g x 1, and then 125mg BID until tapered down to medrol 48mg.  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  Patient was started on IVIG (400mg/kg/daily x 5 day) 1/29/19-2/2.   Patient started on MTX 20mg SQ 2/5 with folic acid. MTX SQ was transitioned to PO because concern about rehab administration.  Patient failed swallow eval and PEG tube was placed.        Denies any family history of autoimmune diseases.     No smoking, EtOH, recreational drug usage.     Denies any photosensitivity, joint swelling, unintentional weigh loss, abdominal pain, night sweats, CP, SOB.  +oral thrush.     Completed rehab at [facility].  Completed IVIG (2/28 and 3/1).  Had mild HA after infusion.  Doing well.  A lot stronger - able to do household chores since discharge.  Not back at baseline yet ~80%.  Mild weakness with increased activities.  Able to ambulate without assistance (but is still a fall precaution).  Tolerating diet via PEG tube.  Completed rehab.     MTX discontinued 2/18 with concern of toxicity (decrease blood counts and transaminatis).  Folic acid increased to 4mg daily.  Still on medrol 32mg daily with atorvaquone for PCP prophylaxis.  Bactrim d/c because of interaction with leucovorin.  Leucovorin 5mg daily started - Leucovorin discontinued.  Continues to be on folic acid 4mg daily    Radiation completed (28 days) completed May 8.      EGD/colonoscopy done on July 29 - normal.  PEG tube removed    Last PET scan (June 20) showed negative for metastasis.  At this time,  will monitor per oncology and repeat PET scan in Sept.  No treatment needed at this time.     Last visit, patient was experiencing flare.  Steroid increased to 10mg BID.  Patient increased it to 25mg daily.  Doing well with increase of steroid.       Patient states that she noticed increase myalgia and rash (helitropic and gottron) appearing around the same time.  Patient states that IVIG only works for ~3 weeks and then wears off.    Patient doing very well with PT - increased muscle strength.  She is planning to return back to work (parttime ~30 hours/week) next week with limitations (no heavy lifting and prolonged standing).  Patient is a .      Interval History     Patient was unable to tolerate going back to work even part time.  Patient experience extreme fatigue and lethargy during her time and was unable to perform her tasks.  Since then patient had stopped working and continued her PT.  Doing significantly better.    Patient states that she was having flu-like symptoms x 1 week.  She is having diffused arthralgia, nasal drainage, and feeling warm.  (No fever, Tmax was around 99). + sick contact (grandson)    Denies any dysphagia, alopecia, arthralgia, abdominal pain, CP, SOB, N/V, chills, or urinary symptoms.        Review of Systems   Constitutional: Negative for chills, diaphoresis, fever, malaise/fatigue and weight loss.   HENT: Negative for congestion, ear discharge, ear pain, hearing loss, nosebleeds, sinus pain and tinnitus.    Eyes: Negative for photophobia, pain, discharge and redness.   Respiratory: Negative for cough, hemoptysis, sputum production, shortness of breath, wheezing and stridor.    Cardiovascular: Negative for chest pain, palpitations, orthopnea, claudication, leg swelling and PND.   Gastrointestinal: Negative for abdominal pain, constipation, diarrhea, heartburn, nausea and vomiting.   Genitourinary: Negative for dysuria, frequency, hematuria and urgency.   Musculoskeletal:  Negative for back pain, joint pain, myalgias and neck pain.   Skin: Negative for rash.   Neurological: Negative for dizziness, tingling, tremors, weakness and headaches.   Endo/Heme/Allergies: Does not bruise/bleed easily.   Psychiatric/Behavioral: Negative for depression, hallucinations and suicidal ideas. The patient is not nervous/anxious and does not have insomnia.        Past Medical History:   Diagnosis Date    Breast cancer 2017    left    Depression     Dermatomyositis     Diverticulosis     Gastritis     Hypertension     Vitamin B12 deficiency 3/8/2018       Past Surgical History:   Procedure Laterality Date    BREAST BIOPSY Left     BREAST RECONSTRUCTION Left 2017     SECTION      COLONOSCOPY  2011    repeat in 10 yrs.    COLONOSCOPY N/A 2019    Procedure: COLONOSCOPY;  Surgeon: Pernell Rosas MD;  Location: [facility] (4TH FLR);  Service: Endoscopy;  Laterality: N/A;  Patient would like to use her PEG tube for bowel prep and then have her PEG tube removed after EGD and colonoscopy procedures while she is still sedated.    D&C      ESOPHAGOGASTRODUODENOSCOPY N/A 2019    Procedure: EGD (ESOPHAGOGASTRODUODENOSCOPY);  Surgeon: Pernell Rosas MD;  Location: [facility] (2ND FLR);  Service: Endoscopy;  Laterality: N/A;    ESOPHAGOGASTRODUODENOSCOPY N/A 2019    Procedure: EGD (ESOPHAGOGASTRODUODENOSCOPY);  Surgeon: Pernell Rosas MD;  Location: [facility] (4TH FLR);  Service: Endoscopy;  Laterality: N/A;  EGD for follow-up of erosive esophagitis per Dr. Rosas    Patient would like to use her PEG tube for bowel prep and then have her PEG tube removed after EGD and colonoscopy procedures while she is still sedated.    INSERTION OF TUNNELED CENTRAL VENOUS CATHETER (CVC) WITH SUBCUTANEOUS PORT Right 10/31/2018    Procedure: [id number];  Surgeon: Deo Palencia MD;  Location: [address];  Service: General;  Laterality: Right;    MASTECTOMY Left 2017     "MYOMECTOMY      PORTACATH PLACEMENT      SENTINEL LYMPH NODE BIOPSY  02/2017    left    TOTAL REDUCTION MAMMOPLASTY Right 2017    UPPER GASTROINTESTINAL ENDOSCOPY          Social History     Tobacco Use    Smoking status: Never Smoker    Smokeless tobacco: Never Used   Substance Use Topics    Alcohol use: Yes     Frequency: Never     Drinks per session: Patient refused     Binge frequency: Never     Comment: rare    Drug use: No       Family History   Problem Relation Age of Onset    Heart disease Father     Hypertension Father     Breast cancer Sister 52    Hypertension Mother     No Known Problems Brother     Thyroid disease Daughter         hyperthyroidism s/p thyroidectomy    No Known Problems Son     No Known Problems Brother     No Known Problems Brother     No Known Problems Sister     No Known Problems Daughter     Colon cancer Neg Hx     Ovarian cancer Neg Hx     Celiac disease Neg Hx     Cirrhosis Neg Hx     Colon polyps Neg Hx     Esophageal cancer Neg Hx     Inflammatory bowel disease Neg Hx     Liver cancer Neg Hx     Liver disease Neg Hx     Rectal cancer Neg Hx     Stomach cancer Neg Hx     Ulcerative colitis Neg Hx        Review of patient's allergies indicates:  No Known Allergies        Physical examination:-    Vitals:    11/06/19 1016   BP: 139/84   Pulse: 103   Weight: 83.5 kg (184 lb)   Height: 5' 5" (1.651 m)   PainSc:   3       Physical Exam   Constitutional: She is oriented to person, place, and time and well-developed, well-nourished, and in no distress.   HENT:   Head: Normocephalic and atraumatic.   No oral mouth ulcers   Eyes: Pupils are equal, round, and reactive to light. Conjunctivae are normal.   Neck: Neck supple. No tracheal deviation (tpmt) present.   Cardiovascular: Normal rate, regular rhythm and normal heart sounds.   Pulmonary/Chest: Effort normal and breath sounds normal.   Abdominal: Soft. Bowel sounds are normal. Rebound: pmt. "   Musculoskeletal: She exhibits no edema, tenderness or deformity.   Right Side Rheumatological Exam      Muscle Strength (0-5 scale):  Neck Flexion:  4.5  Neck Extension: 5  Deltoid:  4/5  Biceps: 5/5   Triceps:  5  : 5/5   Iliopsoas: 4/5  Quadriceps:  4/5   Distal Lower Extremity: 5     Left Side Rheumatological Exam      Muscle Strength (0-5 scale):  Neck Flexion:  4.5  Neck Extension: 5  Deltoid:  5  Biceps: /5   Triceps:  5  :  5  Iliopsoas: 4.5/5  Quadriceps:  4.5/5   Distal Lower Extremity: 5     ROM intact   Neurological: She is alert and oriented to person, place, and time. Gait normal.   Skin: Skin is warm and dry.   Guttron's papules on hands  Bilateral helitropic rash      Psychiatric: Mood, memory, affect and judgment normal.             Radiographs:-  Independent visualization of images done.   CXR (1/28) - clear lungs  PET scan - no pulmonary/GI activity.       Medication List with Changes/Refills   New Medications    AZELASTINE (ASTELIN) 137 MCG (0.1 %) NASAL SPRAY    1 spray (137 mcg total) by Nasal route 2 (two) times daily. for 7 days    LEVOCETIRIZINE (XYZAL) 5 MG TABLET    Take 1 tablet (5 mg total) by mouth every evening.    PREDNISONE (DELTASONE) 1 MG TABLET    Take 1 tablet (1 mg total) by mouth once daily. Please take 1-10mg with 1-1mg for a total of 11mg to be taken daily   Current Medications    ACETAMINOPHEN (TYLENOL) 325 MG TABLET    650 mg by Tube route every 4 (four) hours as needed for Pain.    AMLODIPINE (NORVASC) 5 MG TABLET    Take 2 tablets (10 mg total) by mouth once daily.    CALCIUM CARBONATE (OS-ESTELA) 600 MG CALCIUM (1,500 MG) TAB    Take 600 mg by mouth 2 (two) times daily with meals.    FERROUS SULFATE (FEOSOL) 325 MG (65 MG IRON) TAB TABLET    Take 1 tablet (325 mg total) by mouth every 12 (twelve) hours.    FLUTICASONE PROPIONATE (FLONASE) 50 MCG/ACTUATION NASAL SPRAY        FOLIC ACID (FOLVITE) 1 MG TABLET    TAKE 4 TABLETS(4 MG) VIA GTUBE EVERY DAY    OMEPRAZOLE  (PRILOSEC) 40 MG CAPSULE        VITAMIN D (VITAMIN D3) 1000 UNITS TAB    Take 1,000 Units by mouth once daily.   Changed and/or Refilled Medications    Modified Medication Previous Medication    MYCOPHENOLATE (CELLCEPT) 500 MG TAB mycophenolate (CELLCEPT) 500 mg Tab       Take 1 tablet (500 mg total) by mouth once daily.    Take 1 tablet (500 mg total) by mouth once daily.    PREDNISONE (DELTASONE) 10 MG TABLET predniSONE (DELTASONE) 10 MG tablet       Take 1 tablet (10 mg total) by mouth once daily.       Discontinued Medications    PREDNISONE (DELTASONE) 5 MG TABLET    Take 5 mg by mouth once daily.       Assessment/Plans:-   58 y.o.F with history of L sided infiltrating ductal carcinoma of the L breast (dx on Jan 2017), HTN, depression, gastritis, and recently diagnosed myositis (uncertain if it's autoimmune vs medication induced), present today for follow up because of concern about myositis flare.     Patient started on Atelizumab/Abraxane on 11/7/18.  Patient developed swelling of the face on 1/4/19 and went to urgent care and given short course of prednisone 20 mg BID. On 1/15/19, patient seen in hem/onc clinic with c/o of pressure and tightness around neck. CT scan of chest and neck did not reveal any vascular compression. Started on prednisone 60 mg with taper. On 1/22/18 patient with c/o proximal muscle weakness. CPK found to be elevated around 4k. Patient admitted and given solumedrol 80 mg IV on 1/22/18. Last infusion of Atelizumab on 12/19/18. Last infusion of Abraxane on 1/3/19. Given Solumedrol 1g x 1 on 1/23/18. Seen by Dermatology on 1/24/18 and biopsy done of skin rash. Given distribution of rashes, there was concern for dermatomyositis. Patient started on Solumedrol 125 mg IV BID at that time.  Skin biopsy resulted consistent with dermatomyositis.     Labs: CPK and Aldolase normalized. +DARCY 1:640 speckled with negative profile.  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 quantTB: indeterminate, T-spot: negative     Spirometry: normal, normal diffusion capacity. FVC: 81%, DLCO: 114%     Patient exhibits signs of dermatomyositis (heliotropic rash and gottron's papules).   Dermatomyositis can be associated with malignancy.  MRI of LUE showed edema of the deltoid and biceps.  Exam with proximal muscle weakness: b/l deltoids 4/5, b/l iliopsoas 4.4/5. 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   Given +TIF1 gamma positivity, most likely dermatomyositis is from underlying malignancy.      Failed 2nd swallow eval on 2/6/19. PEG placed 2/7/2019.     Current medications:  - prednisone 25mg  - IVIG 1/29/19-2/2, 2/28-3/1, 4/1-2, 5/6-7, 6/6-7, 7/7-8  - folic acid daily 4mg    Esophageal biopsy - negative for viral infection.  Nonspecific chronic inflammation.    EGD/Colonoscopy (July 2019) - normal. PEG tube removed     Fiboscan done Aug 2019 - showed fatty liver with no scarring/damage.,     At this time, patient failed steroid tapering (myalgia and rash).  Will increase steroid at this time to see if symptoms resolve.     Patient is an intermediate metabolizer based on TPMT.  Cannot start imuran.  Labs still neutropenic and thrombocytopenic at this time - uncertain of the cause (radiation induce BM fibrosis vs medication induce?)     Patient unable to tolerate steroid tapering (still on prednisone 25mg daily).  Will start Cellcept 500mg daily to see if we can taper steroid.  If labs worsen, consideration for Rituxan as steroid sparring agent or increase IVIG to 3 weeks.     Vaccination  completed - Prevnar and Shingles (completed Aug 2019) and flu vaccination for this season (Aug 2019)     Cellcept started (Sept 2019).  Currently on 500mg daily (due to leukopenia).     Currently having elevated inflammatory markers, worsening rash and arthralgia.  Also having flu-like symptoms with sick contacts.  Concern about possible flare from viral infection.  Recommendation to follow up with primary care physician for infectious work up.    Plan:  - Concern about flu-like symptoms - worried about flu.  Recommend follow up with PCP for rapid flu.   - Continue cellcept 500mg daily  - CBC, CMP, CPK, Aldolase, ESR, CRP to be done prior to next visit  - Continue prednisone 11mg at this time.  No further tapering at this time.  - If symptoms resolves, recommendation to increase Cellcept to 500mg BID and then start tapering of steroid as previously scheduled at 1mg/2 weeks   - continue folic acid 4mg daily  - continue muscle strengthening exercise daily  - continue PT   - 1200 mg dietary calcium and Vitamin D3 1000 mg daily  - continue IVIG as previously scheduled - scheduled for Nov 10/11 and Dec 9/10  - message/call immediately if worsening muscle weakness      # RTC in 8 weeks

## 2019-11-06 ENCOUNTER — OFFICE VISIT (OUTPATIENT)
Dept: INTERNAL MEDICINE | Facility: CLINIC | Age: 59
End: 2019-11-06
Payer: COMMERCIAL

## 2019-11-06 ENCOUNTER — OFFICE VISIT (OUTPATIENT)
Dept: RHEUMATOLOGY | Facility: CLINIC | Age: 59
End: 2019-11-06
Payer: COMMERCIAL

## 2019-11-06 ENCOUNTER — TELEPHONE (OUTPATIENT)
Dept: PHARMACY | Facility: CLINIC | Age: 59
End: 2019-11-06

## 2019-11-06 ENCOUNTER — PATIENT MESSAGE (OUTPATIENT)
Dept: RHEUMATOLOGY | Facility: CLINIC | Age: 59
End: 2019-11-06

## 2019-11-06 VITALS
OXYGEN SATURATION: 96 % | BODY MASS INDEX: 31.11 KG/M2 | SYSTOLIC BLOOD PRESSURE: 136 MMHG | DIASTOLIC BLOOD PRESSURE: 70 MMHG | TEMPERATURE: 99 F | HEIGHT: 65 IN | HEART RATE: 103 BPM | WEIGHT: 186.75 LBS

## 2019-11-06 VITALS
HEART RATE: 103 BPM | DIASTOLIC BLOOD PRESSURE: 84 MMHG | SYSTOLIC BLOOD PRESSURE: 139 MMHG | BODY MASS INDEX: 30.66 KG/M2 | HEIGHT: 65 IN | WEIGHT: 184 LBS

## 2019-11-06 DIAGNOSIS — J01.40 ACUTE NON-RECURRENT PANSINUSITIS: Primary | ICD-10-CM

## 2019-11-06 DIAGNOSIS — D64.9 ANEMIA, UNSPECIFIED TYPE: ICD-10-CM

## 2019-11-06 DIAGNOSIS — M33.13 DERMATOMYOSITIS: ICD-10-CM

## 2019-11-06 DIAGNOSIS — R09.82 ALLERGIC RHINITIS WITH POSTNASAL DRIP: ICD-10-CM

## 2019-11-06 DIAGNOSIS — D84.821 IMMUNOSUPPRESSION DUE TO DRUG THERAPY: ICD-10-CM

## 2019-11-06 DIAGNOSIS — M35.9 POLYMYOSITIS ASSOCIATED WITH AUTOIMMUNE DISEASE: ICD-10-CM

## 2019-11-06 DIAGNOSIS — M33.20 POLYMYOSITIS ASSOCIATED WITH AUTOIMMUNE DISEASE: ICD-10-CM

## 2019-11-06 DIAGNOSIS — J30.9 ALLERGIC RHINITIS WITH POSTNASAL DRIP: ICD-10-CM

## 2019-11-06 DIAGNOSIS — C50.412 MALIGNANT NEOPLASM OF UPPER-OUTER QUADRANT OF LEFT BREAST IN FEMALE, ESTROGEN RECEPTOR NEGATIVE: ICD-10-CM

## 2019-11-06 DIAGNOSIS — M60.9 MYOSITIS, UNSPECIFIED MYOSITIS TYPE, UNSPECIFIED SITE: ICD-10-CM

## 2019-11-06 DIAGNOSIS — Z79.899 IMMUNOSUPPRESSION DUE TO DRUG THERAPY: ICD-10-CM

## 2019-11-06 DIAGNOSIS — Z17.1 MALIGNANT NEOPLASM OF UPPER-OUTER QUADRANT OF LEFT BREAST IN FEMALE, ESTROGEN RECEPTOR NEGATIVE: ICD-10-CM

## 2019-11-06 DIAGNOSIS — E66.9 OBESITY (BMI 30-39.9): ICD-10-CM

## 2019-11-06 DIAGNOSIS — Z79.899 ENCOUNTER FOR LONG-TERM CURRENT USE OF HIGH RISK MEDICATION: ICD-10-CM

## 2019-11-06 PROCEDURE — 99214 PR OFFICE/OUTPT VISIT, EST, LEVL IV, 30-39 MIN: ICD-10-PCS | Mod: S$GLB,,, | Performed by: STUDENT IN AN ORGANIZED HEALTH CARE EDUCATION/TRAINING PROGRAM

## 2019-11-06 PROCEDURE — 3075F PR MOST RECENT SYSTOLIC BLOOD PRESS GE 130-139MM HG: ICD-10-PCS | Mod: CPTII,S$GLB,, | Performed by: STUDENT IN AN ORGANIZED HEALTH CARE EDUCATION/TRAINING PROGRAM

## 2019-11-06 PROCEDURE — 3008F BODY MASS INDEX DOCD: CPT | Mod: CPTII,S$GLB,, | Performed by: STUDENT IN AN ORGANIZED HEALTH CARE EDUCATION/TRAINING PROGRAM

## 2019-11-06 PROCEDURE — 99999 PR PBB SHADOW E&M-EST. PATIENT-LVL III: ICD-10-PCS | Mod: PBBFAC,,, | Performed by: STUDENT IN AN ORGANIZED HEALTH CARE EDUCATION/TRAINING PROGRAM

## 2019-11-06 PROCEDURE — 99999 PR PBB SHADOW E&M-EST. PATIENT-LVL III: CPT | Mod: PBBFAC,,, | Performed by: STUDENT IN AN ORGANIZED HEALTH CARE EDUCATION/TRAINING PROGRAM

## 2019-11-06 PROCEDURE — 3079F DIAST BP 80-89 MM HG: CPT | Mod: CPTII,S$GLB,, | Performed by: STUDENT IN AN ORGANIZED HEALTH CARE EDUCATION/TRAINING PROGRAM

## 2019-11-06 PROCEDURE — 3075F SYST BP GE 130 - 139MM HG: CPT | Mod: CPTII,S$GLB,, | Performed by: NURSE PRACTITIONER

## 2019-11-06 PROCEDURE — 3078F PR MOST RECENT DIASTOLIC BLOOD PRESSURE < 80 MM HG: ICD-10-PCS | Mod: CPTII,S$GLB,, | Performed by: NURSE PRACTITIONER

## 2019-11-06 PROCEDURE — 3075F SYST BP GE 130 - 139MM HG: CPT | Mod: CPTII,S$GLB,, | Performed by: STUDENT IN AN ORGANIZED HEALTH CARE EDUCATION/TRAINING PROGRAM

## 2019-11-06 PROCEDURE — 3078F DIAST BP <80 MM HG: CPT | Mod: CPTII,S$GLB,, | Performed by: NURSE PRACTITIONER

## 2019-11-06 PROCEDURE — 99999 PR PBB SHADOW E&M-EST. PATIENT-LVL III: CPT | Mod: PBBFAC,,, | Performed by: NURSE PRACTITIONER

## 2019-11-06 PROCEDURE — 99213 OFFICE O/P EST LOW 20 MIN: CPT | Mod: S$GLB,,, | Performed by: NURSE PRACTITIONER

## 2019-11-06 PROCEDURE — 3008F BODY MASS INDEX DOCD: CPT | Mod: CPTII,S$GLB,, | Performed by: NURSE PRACTITIONER

## 2019-11-06 PROCEDURE — 3075F PR MOST RECENT SYSTOLIC BLOOD PRESS GE 130-139MM HG: ICD-10-PCS | Mod: CPTII,S$GLB,, | Performed by: NURSE PRACTITIONER

## 2019-11-06 PROCEDURE — 99214 OFFICE O/P EST MOD 30 MIN: CPT | Mod: S$GLB,,, | Performed by: STUDENT IN AN ORGANIZED HEALTH CARE EDUCATION/TRAINING PROGRAM

## 2019-11-06 PROCEDURE — 99213 PR OFFICE/OUTPT VISIT, EST, LEVL III, 20-29 MIN: ICD-10-PCS | Mod: S$GLB,,, | Performed by: NURSE PRACTITIONER

## 2019-11-06 PROCEDURE — 3079F PR MOST RECENT DIASTOLIC BLOOD PRESSURE 80-89 MM HG: ICD-10-PCS | Mod: CPTII,S$GLB,, | Performed by: STUDENT IN AN ORGANIZED HEALTH CARE EDUCATION/TRAINING PROGRAM

## 2019-11-06 PROCEDURE — 3008F PR BODY MASS INDEX (BMI) DOCUMENTED: ICD-10-PCS | Mod: CPTII,S$GLB,, | Performed by: STUDENT IN AN ORGANIZED HEALTH CARE EDUCATION/TRAINING PROGRAM

## 2019-11-06 PROCEDURE — 99999 PR PBB SHADOW E&M-EST. PATIENT-LVL III: ICD-10-PCS | Mod: PBBFAC,,, | Performed by: NURSE PRACTITIONER

## 2019-11-06 PROCEDURE — 3008F PR BODY MASS INDEX (BMI) DOCUMENTED: ICD-10-PCS | Mod: CPTII,S$GLB,, | Performed by: NURSE PRACTITIONER

## 2019-11-06 RX ORDER — AZELASTINE 1 MG/ML
1 SPRAY, METERED NASAL 2 TIMES DAILY
Qty: 30 ML | Refills: 0 | Status: SHIPPED | OUTPATIENT
Start: 2019-11-06 | End: 2019-12-04

## 2019-11-06 RX ORDER — MYCOPHENOLATE MOFETIL 500 MG/1
500 TABLET ORAL DAILY
Qty: 30 TABLET | Refills: 3 | Status: SHIPPED | OUTPATIENT
Start: 2019-11-06 | End: 2019-12-04 | Stop reason: SDUPTHER

## 2019-11-06 RX ORDER — LEVOCETIRIZINE DIHYDROCHLORIDE 5 MG/1
5 TABLET, FILM COATED ORAL NIGHTLY
Qty: 30 TABLET | Refills: 11 | Status: SHIPPED | OUTPATIENT
Start: 2019-11-06 | End: 2020-03-25

## 2019-11-06 RX ORDER — PREDNISONE 1 MG/1
1 TABLET ORAL DAILY
Qty: 30 TABLET | Refills: 2 | Status: SHIPPED | OUTPATIENT
Start: 2019-11-06 | End: 2019-12-04

## 2019-11-06 RX ORDER — OMEPRAZOLE 40 MG/1
CAPSULE, DELAYED RELEASE ORAL
Refills: 11 | COMMUNITY
Start: 2019-09-30 | End: 2019-12-04

## 2019-11-06 RX ORDER — PREDNISONE 10 MG/1
TABLET ORAL
Refills: 1 | COMMUNITY
Start: 2019-09-24 | End: 2019-11-06

## 2019-11-06 RX ORDER — PREDNISONE 5 MG/1
5 TABLET ORAL DAILY
COMMUNITY
End: 2019-11-06

## 2019-11-06 RX ORDER — PREDNISONE 10 MG/1
10 TABLET ORAL DAILY
Qty: 30 TABLET | Refills: 2 | Status: SHIPPED | OUTPATIENT
Start: 2019-11-06 | End: 2019-12-04 | Stop reason: SDUPTHER

## 2019-11-06 ASSESSMENT — ROUTINE ASSESSMENT OF PATIENT INDEX DATA (RAPID3)
PATIENT GLOBAL ASSESSMENT SCORE: 1.5
PSYCHOLOGICAL DISTRESS SCORE: 2.2
AM STIFFNESS SCORE: 1, YES
FATIGUE SCORE: 1
MDHAQ FUNCTION SCORE: .8
PAIN SCORE: 1.5
TOTAL RAPID3 SCORE: 1.89

## 2019-11-06 NOTE — PROGRESS NOTES
I have reviewed the notes, assessments,documentation by  and I agree with her note and I have made a detailed addendum

## 2019-11-06 NOTE — PROGRESS NOTES
Subjective:       Patient ID: Ermelinda Verde is a 58 y.o. female.    Chief Complaint: Nasal Congestion and Fever    Pt of Dr. Weeks, here for sinus congestion and fever    Sinus Problem   This is a new problem. The current episode started 1 to 4 weeks ago (1 week). The problem is unchanged. There has been no fever (low grade fever 99.5). Her pain is at a severity of 0/10. She is experiencing no pain. Associated symptoms include congestion, headaches, sinus pressure and sneezing. Pertinent negatives include no chills, coughing, ear pain, hoarse voice, neck pain, shortness of breath or sore throat. (Postnasal drip  ) Treatments tried: robutissin. The treatment provided mild relief.     Review of Systems   Constitutional: Positive for fever. Negative for activity change, chills and fatigue.   HENT: Positive for congestion, postnasal drip, rhinorrhea, sinus pressure and sneezing. Negative for ear pain, hoarse voice, sinus pain, sore throat, trouble swallowing and voice change.    Eyes: Negative for pain and visual disturbance.   Respiratory: Negative for cough, chest tightness and shortness of breath.    Cardiovascular: Negative for chest pain, palpitations and leg swelling.   Gastrointestinal: Negative for abdominal pain, diarrhea, nausea and vomiting.   Musculoskeletal: Negative for arthralgias, back pain and neck pain.   Skin: Negative for pallor and rash.   Allergic/Immunologic: Negative for environmental allergies, food allergies and immunocompromised state.   Neurological: Positive for headaches. Negative for dizziness, weakness and light-headedness.   Hematological: Negative for adenopathy. Does not bruise/bleed easily.   Psychiatric/Behavioral: Negative for suicidal ideas.         Review of patient's allergies indicates:  No Known Allergies    Current Outpatient Medications:     acetaminophen (TYLENOL) 325 MG tablet, 650 mg by Tube route every 4 (four) hours as needed for Pain., Disp: , Rfl:     amLODIPine  (NORVASC) 5 MG tablet, Take 2 tablets (10 mg total) by mouth once daily., Disp: 60 tablet, Rfl: 11    calcium carbonate (OS-ESTELA) 600 mg calcium (1,500 mg) Tab, Take 600 mg by mouth 2 (two) times daily with meals., Disp: , Rfl:     ferrous sulfate (FEOSOL) 325 mg (65 mg iron) Tab tablet, Take 1 tablet (325 mg total) by mouth every 12 (twelve) hours., Disp: 60 tablet, Rfl: 4    fluticasone propionate (FLONASE) 50 mcg/actuation nasal spray, , Disp: , Rfl: 5    folic acid (FOLVITE) 1 MG tablet, TAKE 4 TABLETS(4 MG) VIA GTUBE EVERY DAY, Disp: 120 tablet, Rfl: 2    mycophenolate (CELLCEPT) 500 mg Tab, Take 1 tablet (500 mg total) by mouth once daily., Disp: 30 tablet, Rfl: 3    omeprazole (PRILOSEC) 40 MG capsule, , Disp: , Rfl: 11    predniSONE (DELTASONE) 1 MG tablet, Take 1 tablet (1 mg total) by mouth once daily. Please take 1-10mg with 1-1mg for a total of 11mg to be taken daily, Disp: 30 tablet, Rfl: 2    predniSONE (DELTASONE) 10 MG tablet, Take 1 tablet (10 mg total) by mouth once daily., Disp: 30 tablet, Rfl: 2    vitamin D (VITAMIN D3) 1000 units Tab, Take 1,000 Units by mouth once daily., Disp: , Rfl:     Patient Active Problem List   Diagnosis    Breast cancer of upper-outer quadrant of left female breast    Encounter for antineoplastic chemotherapy    Adjustment disorder with depressed mood    Pre-diabetes    Regional lymph node metastasis present    Breast cancer in female    Dysphagia    Dermatomyositis    Polymyositis associated with autoimmune disease    Congestion of upper airway    Moderate malnutrition    Impaired functional mobility and endurance    Anemia    Immunosuppression due to drug therapy    Erosive esophagitis    Gastro-esophageal reflux disease with esophagitis    Chemotherapy-induced neuropathy    Hepatic cyst    Colon cancer screening     Past Medical History:   Diagnosis Date    Breast cancer 01/01/2017    left    Depression     Dermatomyositis      Diverticulosis     Gastritis     Hypertension     Vitamin B12 deficiency 3/8/2018     Past Surgical History:   Procedure Laterality Date    BREAST BIOPSY Left     BREAST RECONSTRUCTION Left 2017     SECTION      COLONOSCOPY  2011    repeat in 10 yrs.    COLONOSCOPY N/A 2019    Procedure: COLONOSCOPY;  Surgeon: Pernell Rosas MD;  Location: [facility] (4TH FLR);  Service: Endoscopy;  Laterality: N/A;  Patient would like to use her PEG tube for bowel prep and then have her PEG tube removed after EGD and colonoscopy procedures while she is still sedated.    D&C      ESOPHAGOGASTRODUODENOSCOPY N/A 2019    Procedure: EGD (ESOPHAGOGASTRODUODENOSCOPY);  Surgeon: Pernell Rosas MD;  Location: [facility] (2ND FLR);  Service: Endoscopy;  Laterality: N/A;    ESOPHAGOGASTRODUODENOSCOPY N/A 2019    Procedure: EGD (ESOPHAGOGASTRODUODENOSCOPY);  Surgeon: Pernell Rosas MD;  Location: [facility] (4TH FLR);  Service: Endoscopy;  Laterality: N/A;  EGD for follow-up of erosive esophagitis per Dr. Rosas    Patient would like to use her PEG tube for bowel prep and then have her PEG tube removed after EGD and colonoscopy procedures while she is still sedated.    INSERTION OF TUNNELED CENTRAL VENOUS CATHETER (CVC) WITH SUBCUTANEOUS PORT Right 10/31/2018    Procedure: [id number];  Surgeon: Deo Palencia MD;  [address];  Service: General;  Laterality: Right;    MASTECTOMY Left 2017    MYOMECTOMY      PORTACATH PLACEMENT      SENTINEL LYMPH NODE BIOPSY  2017    left    TOTAL REDUCTION MAMMOPLASTY Right 2017    UPPER GASTROINTESTINAL ENDOSCOPY       Social History     Socioeconomic History    Marital status:      Spouse name: Not on file    Number of children: Not on file    Years of education: Not on file    Highest education level: Not on file   Occupational History    Occupation: banking   Social Needs    Financial resource strain: Not very hard    Food  "insecurity:     Worry: Never true     Inability: Never true    Transportation needs:     Medical: No     Non-medical: No   Tobacco Use    Smoking status: Never Smoker    Smokeless tobacco: Never Used   Substance and Sexual Activity    Alcohol use: Yes     Frequency: Never     Drinks per session: Patient refused     Binge frequency: Never     Comment: rare    Drug use: No    Sexual activity: Yes     Partners: Male     Birth control/protection: Post-menopausal   Lifestyle    Physical activity:     Days per week: 3 days     Minutes per session: 30 min    Stress: To some extent   Relationships    Social connections:     Talks on phone: Twice a week     Gets together: Once a week     Attends [employer] service: Not on file     Active member of club or organization: No     Attends meetings of clubs or organizations: Never     Relationship status:    Other Topics Concern    Not on file   Social History Narrative    Not on file     Family History   Problem Relation Age of Onset    Heart disease Father     Hypertension Father     Breast cancer Sister 52    Hypertension Mother     No Known Problems Brother     Thyroid disease Daughter         hyperthyroidism s/p thyroidectomy    No Known Problems Son     No Known Problems Brother     No Known Problems Brother     No Known Problems Sister     No Known Problems Daughter     Colon cancer Neg Hx     Ovarian cancer Neg Hx     Celiac disease Neg Hx     Cirrhosis Neg Hx     Colon polyps Neg Hx     Esophageal cancer Neg Hx     Inflammatory bowel disease Neg Hx     Liver cancer Neg Hx     Liver disease Neg Hx     Rectal cancer Neg Hx     Stomach cancer Neg Hx     Ulcerative colitis Neg Hx        Objective:       Vitals:    11/06/19 1319   BP: 136/70   Pulse: 103   Temp: 98.5 °F (36.9 °C)   SpO2: 96%   Weight: 84.7 kg (186 lb 11.7 oz)   Height: 5' 5" (1.651 m)   PainSc: 0-No pain       Body mass index is 31.07 kg/m².    Physical Exam "   Constitutional: She is oriented to person, place, and time. Vital signs are normal. She appears well-developed and well-nourished.   obese   HENT:   Head: Normocephalic.   Right Ear: Tympanic membrane, external ear and ear canal normal.   Left Ear: Tympanic membrane, external ear and ear canal normal.   Nose: Mucosal edema and rhinorrhea present. No sinus tenderness or nasal deformity. No epistaxis. Right sinus exhibits no maxillary sinus tenderness and no frontal sinus tenderness. Left sinus exhibits no maxillary sinus tenderness and no frontal sinus tenderness.   Mouth/Throat: Uvula is midline, oropharynx is clear and moist and mucous membranes are normal. Mucous membranes are not pale. No oropharyngeal exudate, posterior oropharyngeal edema, posterior oropharyngeal erythema or tonsillar abscesses.   Clear PND noted on exam    Clearing throat on exam   Eyes: Pupils are equal, round, and reactive to light. Conjunctivae, EOM and lids are normal.   Neck: Trachea normal, normal range of motion and phonation normal. Neck supple. No JVD present. Carotid bruit is not present. No thyromegaly present.   Cardiovascular: Normal rate, regular rhythm, normal heart sounds and intact distal pulses.   Pulmonary/Chest: Effort normal and breath sounds normal.   Abdominal: Normal appearance.   Neurological: She is alert and oriented to person, place, and time.   Skin: Skin is warm, dry and intact. Capillary refill takes less than 2 seconds.   Psychiatric: She has a normal mood and affect. Her speech is normal and behavior is normal. Judgment and thought content normal. Cognition and memory are normal.   Nursing note and vitals reviewed.      Assessment:       1. Acute non-recurrent pansinusitis    2. Allergic rhinitis with postnasal drip    3. BMI 31.0-31.9,adult    4. Obesity (BMI 30-39.9)        Plan:       Ermelinda was seen today for nasal congestion and fever.    Diagnoses and all orders for this visit:    Acute non-recurrent  pansinusitis  -     levocetirizine (XYZAL) 5 MG tablet; Take 1 tablet (5 mg total) by mouth every evening.  -     azelastine (ASTELIN) 137 mcg (0.1 %) nasal spray; 1 spray (137 mcg total) by Nasal route 2 (two) times daily. for 7 days    Allergic rhinitis with postnasal drip  -     levocetirizine (XYZAL) 5 MG tablet; Take 1 tablet (5 mg total) by mouth every evening.  -     azelastine (ASTELIN) 137 mcg (0.1 %) nasal spray; 1 spray (137 mcg total) by Nasal route 2 (two) times daily. for 7 days    BMI 31.0-31.9,adult  BMI reviewed    Obesity (BMI 30-39.9)  BMI reviewed.    Diet and exercise to lose weight.    .Pt instructed that this is viral, antibiotics not indicated.    Rest, fluids, self limiting.    May take Tylenol or Motrin otc prn pain.    Meds as prescribed.    Rest and Fluids, Tylenol or Motrin otc as needed for pain and or fever    Warm liquids to thin mucus    Allergen avoidance discussed, humidified air recommended    Warm salt water gargles as needed for throat pain    Nasal spray, taught how to correctly use    Self care instructions provided in AVS    Follow up if symptoms worsen or fail to improve.

## 2019-11-06 NOTE — PROGRESS NOTES
58 year old female with dermatomyositis s/p PD1 inhibitor used for breast cancer  TIF1 gamma +  Low dose steroids didn't help  Rash+ muscle weakness+dysphagia   IVIG and steroids initial treatment  mtx causes LFT derangement,anemia and leukopenia     On IVIG  On cellcept 500 mg daily  On prednisone 11 mg     Imuran not an option since she is intermediate metaboliser     Currently flu like symptoms   All labs abnormal    Plan     Prednisone 11 mg for 4 weeks  Flu testing  Once she is fine increase cellcept to 1000 mg   IVIG   Labs in 4 weeks and then prednisone taper           Answers for HPI/ROS submitted by the patient on 11/3/2019   fever: No  eye redness: Yes  headaches: Yes  shortness of breath: No  chest pain: No  trouble swallowing: No  diarrhea: No  constipation: Yes  unexpected weight change: No  genital sore: No  dysuria: No  During the last 3 days, have you had a skin rash?: No  Bruises or bleeds easily: No  cough: No

## 2019-11-06 NOTE — TELEPHONE ENCOUNTER
Spoke with patient in regards to her prescription insurance. Patient stated that her coverage should be active between 11/7-11/8/19. We will continue to check her eligibility and then schedule her refill once we can.

## 2019-11-06 NOTE — PATIENT INSTRUCTIONS
.Pt instructed that this is viral, antibiotics not indicated.    Rest, fluids, self limiting.    May take Tylenol or Motrin otc prn pain.    Meds as prescribed.    Rest and Fluids, Tylenol or Motrin otc as needed for pain and or fever    Warm liquids to thin mucus    Allergen avoidance discussed, humidified air recommended    Warm salt water gargles as needed for throat pain    Nasal spray, taught how to correctly use      Sinusitis (No Antibiotics)    The sinuses are air-filled spaces within the bones of the face. They connect to the inside of the nose. Sinusitis is an inflammation of the tissue lining the sinus cavity. Sinus inflammation can occur during a cold. It can also be due to allergies to pollens and other particles in the air. It can cause symptoms such as sinus congestion, headache, sore throat, facial swelling and fullness. It may also cause a low-grade fever. No infection is present, and no antibiotic treatment is needed.  Home care  · Drink plenty of water, hot tea, and other liquids. This may help thin mucus. It also may promote sinus drainage.  · Heat may help soothe painful areas of the face. Use a towel soaked in hot water. Or,  the shower and direct the hot spray onto your face. Using a vaporizer along with a menthol rub at night may also help.   · An expectorant containing guaifenesin may help thin the mucus and promote drainage from the sinuses.  · Over-the-counter decongestants may be used unless a similar medicine was prescribed. Nasal sprays work the fastest. Use one that contains phenylephrine or oxymetazoline. First blow the nose gently. Then use the spray. Do not use these medicines more often than directed on the label or symptoms may get worse. You may also use tablets containing pseudoephedrine. Avoid products that combine ingredients, because side effects may be increased. Read labels. You can also ask the pharmacist for help. (NOTE: Persons with high blood pressure should not  use decongestants. They can raise blood pressure.)  · Over-the-counter antihistamines may help if allergies contributed to your sinusitis.    · Use acetaminophen or ibuprofen to control pain, unless another pain medicine was prescribed. (If you have chronic liver or kidney disease or ever had a stomach ulcer, talk with your doctor before using these medicines. Aspirin should never be used in anyone under 18 years of age who is ill with a fever. It may cause severe liver damage.)  · Use nasal rinses or irrigation as instructed by your health care provider.  · Don't smoke. This can worsen symptoms.  Follow-up care  Follow up with your healthcare provider or our staff if you are not improving within the next week.  When to seek medical advice  Call your healthcare provider if any of these occur:  · Green or yellow discharge from the nose or into the throat  · Facial pain or headache becoming more severe  · Stiff neck  · Unusual drowsiness or confusion  · Swelling of the forehead or eyelids  · Vision problems, including blurred or double vision  · Fever of 100.4ºF (38ºC) or higher, or as directed by your healthcare provider  · Seizure  · Breathing problems  · Symptoms not resolving within 10 days  Date Last Reviewed: 4/13/2015  © 4907-5184 [employer]. [address] [place] [address], [place] [address]. All rights reserved. This information is not intended as a substitute for professional medical care. Always follow your healthcare professional's instructions.

## 2019-11-06 NOTE — PROGRESS NOTES
Rapid3 Question Responses and Scores 11/3/2019   MDHAQ Score 0.8   Psychologic Score 2.2   Pain Score 1.5   When you awakened in the morning OVER THE LAST WEEK, did you feel stiff? Yes   If Yes, please indicate the number of hours until you are as limber as you will be for the day 1   Fatigue Score 1   Global Health Score 1.5   RAPID3 Score 1.88       Answers for HPI/ROS submitted by the patient on 11/3/2019   fever: No  eye redness: Yes  headaches: Yes  shortness of breath: No  chest pain: No  trouble swallowing: No  diarrhea: No  constipation: Yes  unexpected weight change: No  genital sore: No  dysuria: No  During the last 3 days, have you had a skin rash?: No  Bruises or bleeds easily: No  cough: No

## 2019-11-08 ENCOUNTER — TELEPHONE (OUTPATIENT)
Dept: PHARMACY | Facility: CLINIC | Age: 59
End: 2019-11-08

## 2019-11-11 ENCOUNTER — INFUSION (OUTPATIENT)
Dept: INFUSION THERAPY | Facility: HOSPITAL | Age: 59
End: 2019-11-11
Attending: INTERNAL MEDICINE
Payer: COMMERCIAL

## 2019-11-11 ENCOUNTER — TELEPHONE (OUTPATIENT)
Dept: PHARMACY | Facility: CLINIC | Age: 59
End: 2019-11-11

## 2019-11-11 VITALS
HEART RATE: 94 BPM | HEIGHT: 65 IN | BODY MASS INDEX: 31.11 KG/M2 | RESPIRATION RATE: 18 BRPM | WEIGHT: 186.75 LBS | TEMPERATURE: 98 F | SYSTOLIC BLOOD PRESSURE: 133 MMHG | DIASTOLIC BLOOD PRESSURE: 71 MMHG

## 2019-11-11 DIAGNOSIS — M33.13 DERMATOMYOSITIS: Primary | ICD-10-CM

## 2019-11-11 DIAGNOSIS — R13.19 ESOPHAGEAL DYSPHAGIA: ICD-10-CM

## 2019-11-11 PROCEDURE — 25000003 PHARM REV CODE 250: Performed by: INTERNAL MEDICINE

## 2019-11-11 PROCEDURE — 96367 TX/PROPH/DG ADDL SEQ IV INF: CPT

## 2019-11-11 PROCEDURE — 96366 THER/PROPH/DIAG IV INF ADDON: CPT

## 2019-11-11 PROCEDURE — S0028 INJECTION, FAMOTIDINE, 20 MG: HCPCS | Performed by: INTERNAL MEDICINE

## 2019-11-11 PROCEDURE — A4216 STERILE WATER/SALINE, 10 ML: HCPCS | Performed by: INTERNAL MEDICINE

## 2019-11-11 PROCEDURE — 63600175 PHARM REV CODE 636 W HCPCS: Performed by: INTERNAL MEDICINE

## 2019-11-11 PROCEDURE — 96365 THER/PROPH/DIAG IV INF INIT: CPT

## 2019-11-11 PROCEDURE — 96375 TX/PRO/DX INJ NEW DRUG ADDON: CPT

## 2019-11-11 RX ORDER — FAMOTIDINE 10 MG/ML
20 INJECTION INTRAVENOUS
Status: COMPLETED | OUTPATIENT
Start: 2019-11-11 | End: 2019-11-11

## 2019-11-11 RX ORDER — FAMOTIDINE 10 MG/ML
20 INJECTION INTRAVENOUS
Status: CANCELLED | OUTPATIENT
Start: 2019-12-02

## 2019-11-11 RX ORDER — HEPARIN 100 UNIT/ML
500 SYRINGE INTRAVENOUS
Status: DISCONTINUED | OUTPATIENT
Start: 2019-11-11 | End: 2019-11-11 | Stop reason: HOSPADM

## 2019-11-11 RX ORDER — HEPARIN 100 UNIT/ML
500 SYRINGE INTRAVENOUS
Status: CANCELLED | OUTPATIENT
Start: 2019-12-02

## 2019-11-11 RX ORDER — SODIUM CHLORIDE 0.9 % (FLUSH) 0.9 %
10 SYRINGE (ML) INJECTION
Status: CANCELLED | OUTPATIENT
Start: 2019-12-02

## 2019-11-11 RX ORDER — SODIUM CHLORIDE 0.9 % (FLUSH) 0.9 %
10 SYRINGE (ML) INJECTION
Status: DISCONTINUED | OUTPATIENT
Start: 2019-11-11 | End: 2019-11-11 | Stop reason: HOSPADM

## 2019-11-11 RX ORDER — ACETAMINOPHEN 325 MG/1
650 TABLET ORAL
Status: COMPLETED | OUTPATIENT
Start: 2019-11-11 | End: 2019-11-11

## 2019-11-11 RX ORDER — ACETAMINOPHEN 325 MG/1
650 TABLET ORAL
Status: CANCELLED | OUTPATIENT
Start: 2019-12-02

## 2019-11-11 RX ADMIN — SODIUM CHLORIDE: 0.9 INJECTION, SOLUTION INTRAVENOUS at 08:11

## 2019-11-11 RX ADMIN — DIPHENHYDRAMINE HYDROCHLORIDE 50 MG: 50 INJECTION, SOLUTION INTRAMUSCULAR; INTRAVENOUS at 08:11

## 2019-11-11 RX ADMIN — ACETAMINOPHEN 650 MG: 325 TABLET ORAL at 08:11

## 2019-11-11 RX ADMIN — HEPARIN 500 UNITS: 100 SYRINGE at 12:11

## 2019-11-11 RX ADMIN — FAMOTIDINE 20 MG: 10 INJECTION, SOLUTION INTRAVENOUS at 08:11

## 2019-11-11 RX ADMIN — Medication 10 ML: at 12:11

## 2019-11-11 RX ADMIN — HUMAN IMMUNOGLOBULIN G 85 G: 20 LIQUID INTRAVENOUS at 08:11

## 2019-11-11 NOTE — PLAN OF CARE
0745-Labs , hx, and medications reviewed, patient is here for day 1 ivig. Assessment completed. Discussed plan of care with patient. Patient in agreement. Chair reclined and warm blanket and snack offered.

## 2019-11-12 ENCOUNTER — INFUSION (OUTPATIENT)
Dept: INFUSION THERAPY | Facility: HOSPITAL | Age: 59
End: 2019-11-12
Attending: INTERNAL MEDICINE
Payer: COMMERCIAL

## 2019-11-12 VITALS
RESPIRATION RATE: 18 BRPM | TEMPERATURE: 99 F | HEART RATE: 100 BPM | DIASTOLIC BLOOD PRESSURE: 74 MMHG | SYSTOLIC BLOOD PRESSURE: 140 MMHG

## 2019-11-12 DIAGNOSIS — M33.13 DERMATOMYOSITIS: Primary | ICD-10-CM

## 2019-11-12 DIAGNOSIS — R13.19 ESOPHAGEAL DYSPHAGIA: ICD-10-CM

## 2019-11-12 PROCEDURE — 96375 TX/PRO/DX INJ NEW DRUG ADDON: CPT

## 2019-11-12 PROCEDURE — 63600175 PHARM REV CODE 636 W HCPCS: Performed by: INTERNAL MEDICINE

## 2019-11-12 PROCEDURE — 96366 THER/PROPH/DIAG IV INF ADDON: CPT

## 2019-11-12 PROCEDURE — 25000003 PHARM REV CODE 250: Performed by: INTERNAL MEDICINE

## 2019-11-12 PROCEDURE — 96365 THER/PROPH/DIAG IV INF INIT: CPT

## 2019-11-12 PROCEDURE — 96367 TX/PROPH/DG ADDL SEQ IV INF: CPT

## 2019-11-12 PROCEDURE — S0028 INJECTION, FAMOTIDINE, 20 MG: HCPCS | Performed by: INTERNAL MEDICINE

## 2019-11-12 RX ORDER — SODIUM CHLORIDE 0.9 % (FLUSH) 0.9 %
10 SYRINGE (ML) INJECTION
Status: DISCONTINUED | OUTPATIENT
Start: 2019-11-12 | End: 2019-11-12 | Stop reason: HOSPADM

## 2019-11-12 RX ORDER — HEPARIN 100 UNIT/ML
500 SYRINGE INTRAVENOUS
Status: DISCONTINUED | OUTPATIENT
Start: 2019-11-12 | End: 2019-11-12 | Stop reason: HOSPADM

## 2019-11-12 RX ORDER — FAMOTIDINE 10 MG/ML
20 INJECTION INTRAVENOUS
Status: COMPLETED | OUTPATIENT
Start: 2019-11-12 | End: 2019-11-12

## 2019-11-12 RX ORDER — ACETAMINOPHEN 325 MG/1
650 TABLET ORAL
Status: COMPLETED | OUTPATIENT
Start: 2019-11-12 | End: 2019-11-12

## 2019-11-12 RX ORDER — SODIUM CHLORIDE 0.9 % (FLUSH) 0.9 %
10 SYRINGE (ML) INJECTION
Status: CANCELLED | OUTPATIENT
Start: 2019-12-09

## 2019-11-12 RX ORDER — ACETAMINOPHEN 325 MG/1
650 TABLET ORAL
Status: CANCELLED | OUTPATIENT
Start: 2019-12-09

## 2019-11-12 RX ORDER — FAMOTIDINE 10 MG/ML
20 INJECTION INTRAVENOUS
Status: CANCELLED | OUTPATIENT
Start: 2019-12-09

## 2019-11-12 RX ORDER — HEPARIN 100 UNIT/ML
500 SYRINGE INTRAVENOUS
Status: CANCELLED | OUTPATIENT
Start: 2019-12-09

## 2019-11-12 RX ADMIN — SODIUM CHLORIDE: 0.9 INJECTION, SOLUTION INTRAVENOUS at 08:11

## 2019-11-12 RX ADMIN — DIPHENHYDRAMINE HYDROCHLORIDE 50 MG: 50 INJECTION, SOLUTION INTRAMUSCULAR; INTRAVENOUS at 08:11

## 2019-11-12 RX ADMIN — HEPARIN 500 UNITS: 100 SYRINGE at 12:11

## 2019-11-12 RX ADMIN — ACETAMINOPHEN 650 MG: 325 TABLET ORAL at 08:11

## 2019-11-12 RX ADMIN — HUMAN IMMUNOGLOBULIN G 85 G: 5 LIQUID INTRAVENOUS at 08:11

## 2019-11-12 RX ADMIN — FAMOTIDINE 20 MG: 10 INJECTION, SOLUTION INTRAVENOUS at 08:11

## 2019-11-12 NOTE — PLAN OF CARE
Pt tolerated IVIG with no complications. VSS. Pt instructed to call MD with any problems. NAD. Pt discharged home independently.

## 2019-11-25 NOTE — PROGRESS NOTES
Breast Surgery  [facility]  Department of Surgery        REFERRING PROVIDER: Alfredo English  MEDICAL ONCOLOGIST: Dr. Alex Reyna  RADIATION ONCOLOGIST:  Dr. Jose Lopez    Chief Complaint: recurrent breast cancer, oncological follow up        Subjective:      Patient ID: Ermelinda Verde is a 56 y.o. female who recently was diagnosed with metastatic breast cancer to supraclavicular and cervical chain nodes and has now completed chemotherapy with nab-paclitaxel and Atezolizumab with Dr. Reyna. She has also completed radiation with Dr. Lopez.  SHe had a difficult time with treatment developing dermatomyositis requiring prolonged hospitalization and PEG tube, which has since been removed.  She otherwise has no complaints. No CP, SOB, Back or bony pain, HA or vision changes.  She is s/p left skin sparing total mastectomy with SCOTT flap reconstruction (Dr. Sewell) on 6/26/17.   Due to the fact that she had residual malignancy at time of surgery, she received additional chemotherapy with single agent Adriamycin for 4 cycles given in a dose dense fashion.     Oncologic History:  She developed a palpable abnormality in her left breast in January 2017 which she noted on self-examination. A diagnostic mammogram on January 19 showed a greater than 1 cm nodule in the upper outer portion of left breast.  By ultrasound this was lobulated and hypoechoic measuring 1.75 x 1.51 x 1.96 cm. On January 24, 2017 a core needle biopsy was performed which showed infiltrating ductal carcinoma, high grade.  The tumor was ER negative, AZ negative, and HER-2 negative. A follow-up ultrasound on February 6 showed 2.5 x 2.2 x 1.5 cm left breast mass.  There was no abnormality noted in the left axilla. She underwent sentinel lymph node biopsy on February 22.  That showed 4 negative lymph nodes.     She had 4 cycles of  Wilbur-adjuvantTaxotere and Cytoxan completed  on 5/9/17.     On June 26 she underwent left mastectomy.  That revealed 2  foci of invasive high-grade carcinoma measuring 14 mm and 1.5 mm.  Margins were negative.     She completed 4 cycles of adjuvant Adriamycin on 2017.     She then developed a palpable supraclavicular LN in Oct 2018 which was biopsied and showed triple negative metastatic adenocarcinoma. PET scan (10/11/18) showed multiple enlarged left-sided hypermetabolic nodes are present involving the left lower cervical/supraclavicular chain, infraclavicular region, and retropectoral region.  Index left supraclavicular node (for axis measuring 2.0 cm) has SUV max 27.2 and index left retropectoral node (ill-defined with short axis measuring approximately 2.1 cm) has SUV max 27.2    BRCA 1/2 negative.    Interval History:   57 yo female presents for oncological follow up. She is s/p left skin sparing total mastectomy with SCOTT flap reconstruction on 17. She is doing well. Has not noticed any new masses in contralateral breast, no nipple discharge or overlying skin changes.  No back or bony pain, no cough or shortness of breath, no nausea/ vomiting, no vision change.  She does continue to have fullness in the left supraclavicular region however no distinct palpable lymphadenopathy          Past Medical History:   Diagnosis Date    Cancer       breast    Hypertension              Past Surgical History:   Procedure Laterality Date    BREAST SURGERY   2017     left breast bx     SECTION        D&C        PORTACATH PLACEMENT        SENTINEL LYMPH NODE BIOPSY   2017     left    UTERINE FIBROID SURGERY                 Current Outpatient Prescriptions on File Prior to Visit   Medication Sig Dispense Refill    amlodipine-benazepril (LOTREL) 10-40 mg per capsule TK 1 C PO QD   3    CALCIUM CARBONATE (CALCIUM 500 ORAL) Take by mouth.        cyanocobalamin (VITAMIN B-12) 500 MCG tablet Take 500 mcg by mouth once daily.        DOCUSATE CALCIUM (STOOL SOFTENER ORAL) Take by mouth.         "hydrochlorothiazide (HYDRODIURIL) 25 MG tablet TK 1 T PO ONCE A DAY   3    metoprolol succinate (TOPROL-XL) 50 MG 24 hr tablet TK 1/2 T PO QD   3    potassium chloride SA (K-DUR,KLOR-CON) 20 MEQ tablet TK 1 T PO  QD   3    ferrous gluconate (FERGON) 324 MG tablet TK 1 T PO QD   3      No current facility-administered medications on file prior to visit.       Social History   Social History            Social History    Marital status:        Spouse name: N/A    Number of children: N/A    Years of education: N/A          Occupational History    Not on file.             Social History Main Topics    Smoking status: Never Smoker    Smokeless tobacco: Not on file    Alcohol use Yes         Comment: wine    Drug use: No    Sexual activity: Not on file           Other Topics Concern    Not on file          Social History Narrative    No narrative on file                Family History   Problem Relation Age of Onset    Heart disease Father      Breast cancer Sister         at age 52 or 53        Objective:     BP (!) 154/89 (BP Location: Right arm, Patient Position: Sitting, BP Method: Medium (Automatic))   Pulse 106   Temp 98.4 °F (36.9 °C) (Oral)   Ht 5' 5" (1.651 m)   Wt 84.7 kg (186 lb 11.7 oz)   LMP  (LMP Unknown)   BMI 31.07 kg/m²       Physical Exam   Constitutional: She is oriented to person, place, and time. She appears well-developed and well-nourished.   Neck: Normal range of motion. Neck supple. There is a tiny remnant of sub-cm node at the base of the neck in the supraclavicular space, could be the coil marker just below skin surface.  I do not feel additional LAD, no axillary LAD.  Cardiovascular: Normal rate, regular rhythm and normal heart sounds.    Pulmonary/Chest: Effort normal and breath sounds normal.   Well healed incisional scars of the left and right breast around the nipple and inferior to the nipple. No palapble mass or axillary adenopathy.  There continues to be " fullness in her left supraclavicular region but no distinct palpable nodes are present both in the cervical or supraclavicular chains    Abdominal: Soft. Bowel sounds are normal.   Musculoskeletal: Normal range of motion.   Neurological: She is alert and oriented to person, place, and time.      Supraclavicular LN Bx 10/19/18:  Positive for metastatic adenocarcinoma, ER/CO -, HER2 -    Imaging:  PET 10/11/18:  Multiple enlarged left-sided hypermetabolic nodes are present involving the left lower cervical/supraclavicular chain, infraclavicular region, and retropectoral region.  Index left supraclavicular node (for axis measuring 2.0 cm) has SUV max 27.2 and index left retropectoral node (ill-defined with short axis measuring approximately 2.1 cm) has SUV max 27.2.    Right Mammogram 11/13/18:  Computer-aided detection was utilized in the interpretation of this examination. This procedure was performed using tomosynthesis.       The breast is heterogeneously dense, which may obscure small masses. There is no evidence of suspicious masses, microcalcifications or architectural distortion.  Status post right breast reduction. Status post contralateral mastectomy.  Only the remaining breast has been imaged.      Impression:  No mammographic evidence of malignancy.     BI-RADS Category 1: Negative     Recommendation:  Routine screening mammogram in 1 year is recommended.     Assessment:       Ms. Wadsworth is a 58 yo F s/p left total mastectomy skin sparing with DEIP flap reconstruction on 6/26/17 for [id number], Stage IA triple negative IDC presenting with biopsy proven metastatic disease in her left supraclavicular LN, and more PET +  Nodes along left cervical and retropectoral LN chain, now completed chemotherapy and XRT.    Plan:      - most recent PET without evidence of disease but pleural nodule did exist which recommended close follow-up.  Dr. Reyna has a repeat PET scheduled next month  - Follow up with [facility] in 1 year    - Contralateral mammogram due today, next due in one year  - Continue follow up with medical oncology  - Call with any questions or concerns

## 2019-11-26 ENCOUNTER — HOSPITAL ENCOUNTER (OUTPATIENT)
Dept: RADIOLOGY | Facility: HOSPITAL | Age: 59
Discharge: HOME | End: 2019-11-26
Attending: SURGERY
Payer: COMMERCIAL

## 2019-11-26 ENCOUNTER — OFFICE VISIT (OUTPATIENT)
Dept: SURGERY | Facility: CLINIC | Age: 59
End: 2019-11-26
Payer: COMMERCIAL

## 2019-11-26 VITALS
BODY MASS INDEX: 31.11 KG/M2 | HEART RATE: 106 BPM | SYSTOLIC BLOOD PRESSURE: 154 MMHG | HEIGHT: 65 IN | TEMPERATURE: 98 F | HEIGHT: 65 IN | WEIGHT: 186.75 LBS | WEIGHT: 186.75 LBS | BODY MASS INDEX: 31.11 KG/M2 | DIASTOLIC BLOOD PRESSURE: 89 MMHG

## 2019-11-26 DIAGNOSIS — Z17.1 MALIGNANT NEOPLASM OF UPPER-OUTER QUADRANT OF LEFT BREAST IN FEMALE, ESTROGEN RECEPTOR NEGATIVE: Primary | ICD-10-CM

## 2019-11-26 DIAGNOSIS — C50.412 MALIGNANT NEOPLASM OF UPPER-OUTER QUADRANT OF LEFT BREAST IN FEMALE, ESTROGEN RECEPTOR NEGATIVE: Primary | ICD-10-CM

## 2019-11-26 DIAGNOSIS — C50.412 MALIGNANT NEOPLASM OF UPPER-OUTER QUADRANT OF LEFT BREAST IN FEMALE, ESTROGEN RECEPTOR NEGATIVE: Primary | Chronic | ICD-10-CM

## 2019-11-26 DIAGNOSIS — Z12.31 BREAST CANCER SCREENING BY MAMMOGRAM: ICD-10-CM

## 2019-11-26 DIAGNOSIS — Z17.1 MALIGNANT NEOPLASM OF UPPER-OUTER QUADRANT OF LEFT BREAST IN FEMALE, ESTROGEN RECEPTOR NEGATIVE: Primary | Chronic | ICD-10-CM

## 2019-11-26 PROCEDURE — 3077F SYST BP >= 140 MM HG: CPT | Mod: CPTII,S$GLB,, | Performed by: SURGERY

## 2019-11-26 PROCEDURE — 3077F PR MOST RECENT SYSTOLIC BLOOD PRESSURE >= 140 MM HG: ICD-10-PCS | Mod: CPTII,S$GLB,, | Performed by: SURGERY

## 2019-11-26 PROCEDURE — 99999 PR PBB SHADOW E&M-EST. PATIENT-LVL III: ICD-10-PCS | Mod: PBBFAC,,, | Performed by: SURGERY

## 2019-11-26 PROCEDURE — 77067 SCR MAMMO BI INCL CAD: CPT | Mod: TC,PO

## 2019-11-26 PROCEDURE — 99999 PR PBB SHADOW E&M-EST. PATIENT-LVL III: CPT | Mod: PBBFAC,,, | Performed by: SURGERY

## 2019-11-26 PROCEDURE — 77067 SCR MAMMO BI INCL CAD: CPT | Mod: 26,,, | Performed by: RADIOLOGY

## 2019-11-26 PROCEDURE — 3079F DIAST BP 80-89 MM HG: CPT | Mod: CPTII,S$GLB,, | Performed by: SURGERY

## 2019-11-26 PROCEDURE — 3008F BODY MASS INDEX DOCD: CPT | Mod: CPTII,S$GLB,, | Performed by: SURGERY

## 2019-11-26 PROCEDURE — 99212 PR OFFICE/OUTPT VISIT, EST, LEVL II, 10-19 MIN: ICD-10-PCS | Mod: S$GLB,,, | Performed by: SURGERY

## 2019-11-26 PROCEDURE — 77063 BREAST TOMOSYNTHESIS BI: CPT | Mod: 26,,, | Performed by: RADIOLOGY

## 2019-11-26 PROCEDURE — 77063 MAMMO DIGITAL SCREENING RIGHT WITH TOMOSYNTHESIS_CAD: ICD-10-PCS | Mod: 26,,, | Performed by: RADIOLOGY

## 2019-11-26 PROCEDURE — 99212 OFFICE O/P EST SF 10 MIN: CPT | Mod: S$GLB,,, | Performed by: SURGERY

## 2019-11-26 PROCEDURE — 3079F PR MOST RECENT DIASTOLIC BLOOD PRESSURE 80-89 MM HG: ICD-10-PCS | Mod: CPTII,S$GLB,, | Performed by: SURGERY

## 2019-11-26 PROCEDURE — 77067 MAMMO DIGITAL SCREENING RIGHT WITH TOMOSYNTHESIS_CAD: ICD-10-PCS | Mod: 26,,, | Performed by: RADIOLOGY

## 2019-11-26 PROCEDURE — 3008F PR BODY MASS INDEX (BMI) DOCUMENTED: ICD-10-PCS | Mod: CPTII,S$GLB,, | Performed by: SURGERY

## 2019-12-04 ENCOUNTER — OFFICE VISIT (OUTPATIENT)
Dept: RHEUMATOLOGY | Facility: CLINIC | Age: 59
End: 2019-12-04
Payer: COMMERCIAL

## 2019-12-04 VITALS
HEART RATE: 105 BPM | BODY MASS INDEX: 31.51 KG/M2 | HEIGHT: 65 IN | WEIGHT: 189.13 LBS | DIASTOLIC BLOOD PRESSURE: 84 MMHG | SYSTOLIC BLOOD PRESSURE: 135 MMHG

## 2019-12-04 DIAGNOSIS — M33.20 POLYMYOSITIS ASSOCIATED WITH AUTOIMMUNE DISEASE: ICD-10-CM

## 2019-12-04 DIAGNOSIS — C50.412 MALIGNANT NEOPLASM OF UPPER-OUTER QUADRANT OF LEFT BREAST IN FEMALE, ESTROGEN RECEPTOR NEGATIVE: ICD-10-CM

## 2019-12-04 DIAGNOSIS — Z79.899 IMMUNOSUPPRESSION DUE TO DRUG THERAPY: ICD-10-CM

## 2019-12-04 DIAGNOSIS — D64.9 ANEMIA, UNSPECIFIED TYPE: ICD-10-CM

## 2019-12-04 DIAGNOSIS — Z79.899 ENCOUNTER FOR LONG-TERM CURRENT USE OF HIGH RISK MEDICATION: ICD-10-CM

## 2019-12-04 DIAGNOSIS — G62.0 CHEMOTHERAPY-INDUCED NEUROPATHY: Primary | ICD-10-CM

## 2019-12-04 DIAGNOSIS — M60.9 MYOSITIS, UNSPECIFIED MYOSITIS TYPE, UNSPECIFIED SITE: ICD-10-CM

## 2019-12-04 DIAGNOSIS — D84.821 IMMUNOSUPPRESSION DUE TO DRUG THERAPY: ICD-10-CM

## 2019-12-04 DIAGNOSIS — T45.1X5A CHEMOTHERAPY-INDUCED NEUROPATHY: Primary | ICD-10-CM

## 2019-12-04 DIAGNOSIS — M35.9 POLYMYOSITIS ASSOCIATED WITH AUTOIMMUNE DISEASE: ICD-10-CM

## 2019-12-04 DIAGNOSIS — Z17.1 MALIGNANT NEOPLASM OF UPPER-OUTER QUADRANT OF LEFT BREAST IN FEMALE, ESTROGEN RECEPTOR NEGATIVE: ICD-10-CM

## 2019-12-04 DIAGNOSIS — M33.13 DERMATOMYOSITIS: ICD-10-CM

## 2019-12-04 PROCEDURE — 99214 OFFICE O/P EST MOD 30 MIN: CPT | Mod: S$GLB,,, | Performed by: STUDENT IN AN ORGANIZED HEALTH CARE EDUCATION/TRAINING PROGRAM

## 2019-12-04 PROCEDURE — 3075F PR MOST RECENT SYSTOLIC BLOOD PRESS GE 130-139MM HG: ICD-10-PCS | Mod: CPTII,S$GLB,, | Performed by: STUDENT IN AN ORGANIZED HEALTH CARE EDUCATION/TRAINING PROGRAM

## 2019-12-04 PROCEDURE — 3008F PR BODY MASS INDEX (BMI) DOCUMENTED: ICD-10-PCS | Mod: CPTII,S$GLB,, | Performed by: STUDENT IN AN ORGANIZED HEALTH CARE EDUCATION/TRAINING PROGRAM

## 2019-12-04 PROCEDURE — 3075F SYST BP GE 130 - 139MM HG: CPT | Mod: CPTII,S$GLB,, | Performed by: STUDENT IN AN ORGANIZED HEALTH CARE EDUCATION/TRAINING PROGRAM

## 2019-12-04 PROCEDURE — 3008F BODY MASS INDEX DOCD: CPT | Mod: CPTII,S$GLB,, | Performed by: STUDENT IN AN ORGANIZED HEALTH CARE EDUCATION/TRAINING PROGRAM

## 2019-12-04 PROCEDURE — 3079F DIAST BP 80-89 MM HG: CPT | Mod: CPTII,S$GLB,, | Performed by: STUDENT IN AN ORGANIZED HEALTH CARE EDUCATION/TRAINING PROGRAM

## 2019-12-04 PROCEDURE — 99214 PR OFFICE/OUTPT VISIT, EST, LEVL IV, 30-39 MIN: ICD-10-PCS | Mod: S$GLB,,, | Performed by: STUDENT IN AN ORGANIZED HEALTH CARE EDUCATION/TRAINING PROGRAM

## 2019-12-04 PROCEDURE — 99999 PR PBB SHADOW E&M-EST. PATIENT-LVL IV: CPT | Mod: PBBFAC,,, | Performed by: STUDENT IN AN ORGANIZED HEALTH CARE EDUCATION/TRAINING PROGRAM

## 2019-12-04 PROCEDURE — 99999 PR PBB SHADOW E&M-EST. PATIENT-LVL IV: ICD-10-PCS | Mod: PBBFAC,,, | Performed by: STUDENT IN AN ORGANIZED HEALTH CARE EDUCATION/TRAINING PROGRAM

## 2019-12-04 PROCEDURE — 3079F PR MOST RECENT DIASTOLIC BLOOD PRESSURE 80-89 MM HG: ICD-10-PCS | Mod: CPTII,S$GLB,, | Performed by: STUDENT IN AN ORGANIZED HEALTH CARE EDUCATION/TRAINING PROGRAM

## 2019-12-04 RX ORDER — MYCOPHENOLATE MOFETIL 500 MG/1
500 TABLET ORAL 2 TIMES DAILY
Qty: 30 TABLET | Refills: 3 | Status: SHIPPED | OUTPATIENT
Start: 2019-12-04 | End: 2020-02-04

## 2019-12-04 RX ORDER — PREDNISONE 5 MG/1
5 TABLET ORAL DAILY
Qty: 30 TABLET | Refills: 2 | Status: SHIPPED | OUTPATIENT
Start: 2019-12-04 | End: 2019-12-31 | Stop reason: SDUPTHER

## 2019-12-04 RX ORDER — PREDNISONE 10 MG/1
10 TABLET ORAL DAILY
Qty: 30 TABLET | Refills: 2 | Status: SHIPPED | OUTPATIENT
Start: 2019-12-04 | End: 2020-01-03

## 2019-12-04 NOTE — PROGRESS NOTES
I have reviewed the notes,documentation by  and I agree with her recommendations and I have made an addendum to her note

## 2019-12-04 NOTE — PROGRESS NOTES
RHEUMATOLOGY CLINIC FOLLOW UP VISIT    Chief complaints:- Myositis       HPI:-  Ermelinda Javed a 59 y.o.F with history of L sided infiltrating ductal carcinoma of the L breast (dx on Jan 2017), HTN, depression, gastritis, and recently diagnosed myositis (uncertain if it's autoimmune vs medication induced), present today with concern about myositis flare    Patient was initially send from hem/onc clinic for evaluation of possible inflammatory myositis.  Patient was hospitalized from 1/22/19 till 2/13/19 for myositis.      L breast cancer s/p completion of 4 cycles of demi-adjuvant taxotere and cytoxan (completed on 5/9/17) and mastectomy (6/26/17) and then 4 cycles of adjuvant Adriamycin (completed 9/12/17). In 10/2017 - patient developed L supraclavicular lymphadenopathy which FNA confirmed as reoccurrence of previously treated breast cancer.      Patient started on Atelizumab/Abraxane on 11/7/18. Per chart review, patient noticed rashes on the dorsum of her hands on 11/11/18. Seen in urgent care and given topical steroid cream. Then received two more infusions on Atelizumab/Abraxane on 11/21/18 and 12/5/18. Started to notice puffiness around the eyes on 12/11/18. Received another Abraxane infusion on 12/12/18 but this time with hydrocortisone 50 mg which helped reduce the swelling around the eyes. Developed swelling of the face again on 12/15/18. Next infusion of Abraxane done on 12/19/18 with Solucortef and Atelizumab held. Abraxane given again on 1/3/19 with no IV steroid. Patient developed swelling of the face on 1/4/19 and went to urgent care and given short course of prednisone 20 mg BID. On 1/15/19, patient seen in hem/onc clinic with c/o of pressure and tightness around neck. CT scan of chest and neck did not reveal any vascular compression. Started on prednisone 60 mg with taper. On 1/22/18 patient with c/o proximal muscle weakness. CPK found to be elevated around 4k. Patient  admitted and given solumedrol 80 mg IV on 1/22/18. Last infusion of Atelizumab on 12/19/18. Last infusion of Abraxane on 1/3/19. Given Solumedrol 1g x 1 on 1/23/18. Seen by Dermatology on 1/24/18 and biopsy done of skin rash. Given distribution of rashes, there was concern for dermatomyositis. Patient started on Solumedrol 125 mg IV BID at this time.     During her hospitalization patient was started on high dose steroid Solumedrol 80mg IV x 1, 1g x 1, and then 125mg BID until tapered down to medrol 48mg.  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  Patient was started on IVIG (400mg/kg/daily x 5 day) 1/29/19-2/2.   Patient started on MTX 20mg SQ 2/5 with folic acid. MTX SQ was transitioned to PO because concern about rehab administration.  Patient failed swallow eval and PEG tube was placed.        Denies any family history of autoimmune diseases.     No smoking, EtOH, recreational drug usage.     Denies any photosensitivity, joint swelling, unintentional weigh loss, abdominal pain, night sweats, CP, SOB.  +oral thrush.     Completed rehab at [facility].  Completed IVIG (2/28 and 3/1).  Had mild HA after infusion.  Doing well.  A lot stronger - able to do household chores since discharge.  Not back at baseline yet ~80%.  Mild weakness with increased activities.  Able to ambulate without assistance (but is still a fall precaution).  Tolerating diet via PEG tube.  Completed rehab.     MTX discontinued 2/18 with concern of toxicity (decrease blood counts and transaminatis).  Folic acid increased to 4mg daily.  Still on medrol 32mg daily with atorvaquone for PCP prophylaxis.  Bactrim d/c because of interaction with leucovorin.  Leucovorin 5mg daily started - Leucovorin discontinued.  Continues to be on folic acid 4mg daily    Radiation completed (28 days) completed May 8.      EGD/colonoscopy done on July 29 - normal.  PEG tube removed    Last PET scan (June 20) showed negative for metastasis.  At this time,  will monitor per oncology and repeat PET scan in Sept.  No treatment needed at this time.     Last visit, patient was started on cellcept 500mg daily with prednisone 11mg.      Interval History     Patient continues to feel generalized weakness with rash in bilateral eyes and hands (with accompany swelling).  Tolerating diet well with any signs of dysphagia.      Denies any dysphagia, alopecia, arthralgia, abdominal pain, CP, SOB, N/V, chills, or urinary symptoms.        Review of Systems   Constitutional: Negative for chills, diaphoresis, fever, malaise/fatigue and weight loss.   HENT: Negative for congestion, ear discharge, ear pain, hearing loss, nosebleeds, sinus pain and tinnitus.    Eyes: Negative for photophobia, pain, discharge and redness.   Respiratory: Negative for cough, hemoptysis, sputum production, shortness of breath, wheezing and stridor.    Cardiovascular: Negative for chest pain, palpitations, orthopnea, claudication, leg swelling and PND.   Gastrointestinal: Negative for abdominal pain, constipation, diarrhea, heartburn, nausea and vomiting.   Genitourinary: Negative for dysuria, frequency, hematuria and urgency.   Musculoskeletal: Positive for myalgias. Negative for back pain, joint pain and neck pain.   Skin: Positive for rash.   Neurological: Positive for weakness. Negative for dizziness, tingling, tremors and headaches.   Endo/Heme/Allergies: Does not bruise/bleed easily.   Psychiatric/Behavioral: Negative for depression, hallucinations and suicidal ideas. The patient is not nervous/anxious and does not have insomnia.         Past Medical History:   Diagnosis Date    Breast cancer 2017    left    Depression     Dermatomyositis     Diverticulosis     Gastritis     Hypertension     Vitamin B12 deficiency 3/8/2018       Past Surgical History:   Procedure Laterality Date    BREAST BIOPSY Left     BREAST RECONSTRUCTION Left 2017     SECTION      COLONOSCOPY  2011     repeat in 10 yrs.    COLONOSCOPY N/A 7/29/2019    Procedure: COLONOSCOPY;  Surgeon: Pernell Rosas MD;  Location: [facility] (4TH FLR);  Service: Endoscopy;  Laterality: N/A;  Patient would like to use her PEG tube for bowel prep and then have her PEG tube removed after EGD and colonoscopy procedures while she is still sedated.    D&C      ESOPHAGOGASTRODUODENOSCOPY N/A 1/29/2019    Procedure: EGD (ESOPHAGOGASTRODUODENOSCOPY);  Surgeon: Pernell Rosas MD;  Location: [facility] (2ND FLR);  Service: Endoscopy;  Laterality: N/A;    ESOPHAGOGASTRODUODENOSCOPY N/A 7/29/2019    Procedure: EGD (ESOPHAGOGASTRODUODENOSCOPY);  Surgeon: Pernell Rosas MD;  Location: [facility] (4TH FLR);  Service: Endoscopy;  Laterality: N/A;  EGD for follow-up of erosive esophagitis per Dr. Rosas    Patient would like to use her PEG tube for bowel prep and then have her PEG tube removed after EGD and colonoscopy procedures while she is still sedated.    INSERTION OF TUNNELED CENTRAL VENOUS CATHETER (CVC) WITH SUBCUTANEOUS PORT Right 10/31/2018    Procedure: [id number];  Surgeon: Deo Palencia MD;  [facility];  Service: General;  Laterality: Right;    MASTECTOMY Left 06/26/2017    MYOMECTOMY      PORTACATH PLACEMENT      SENTINEL LYMPH NODE BIOPSY  02/2017    left    TOTAL REDUCTION MAMMOPLASTY Right 2017    UPPER GASTROINTESTINAL ENDOSCOPY          Social History     Tobacco Use    Smoking status: Never Smoker    Smokeless tobacco: Never Used   Substance Use Topics    Alcohol use: Yes     Frequency: Never     Drinks per session: Patient refused     Binge frequency: Never     Comment: rare    Drug use: No       Family History   Problem Relation Age of Onset    Heart disease Father     Hypertension Father     Breast cancer Sister 52    Hypertension Mother     No Known Problems Brother     Thyroid disease Daughter         hyperthyroidism s/p thyroidectomy    No Known Problems Son     No Known Problems Brother  "    No Known Problems Brother     No Known Problems Sister     No Known Problems Daughter     Colon cancer Neg Hx     Ovarian cancer Neg Hx     Celiac disease Neg Hx     Cirrhosis Neg Hx     Colon polyps Neg Hx     Esophageal cancer Neg Hx     Inflammatory bowel disease Neg Hx     Liver cancer Neg Hx     Liver disease Neg Hx     Rectal cancer Neg Hx     Stomach cancer Neg Hx     Ulcerative colitis Neg Hx        Review of patient's allergies indicates:  No Known Allergies        Physical examination:-    Vitals:    12/04/19 1321   BP: 135/84   Pulse: 105   Weight: 85.8 kg (189 lb 2.5 oz)   Height: 5' 5" (1.651 m)   PainSc: 0-No pain       Physical Exam   Constitutional: She is oriented to person, place, and time and well-developed, well-nourished, and in no distress.   HENT:   Head: Normocephalic and atraumatic.   No oral mouth ulcers   Eyes: Pupils are equal, round, and reactive to light. Conjunctivae are normal.   Neck: Neck supple. No tracheal deviation (tpmt) present.   Cardiovascular: Normal rate, regular rhythm and normal heart sounds.   Pulmonary/Chest: Effort normal and breath sounds normal.   Abdominal: Soft. Bowel sounds are normal. Rebound: pmt.   Musculoskeletal: She exhibits edema. She exhibits no tenderness or deformity.   Right Side Rheumatological Exam      Muscle Strength (0-5 scale):  Neck Flexion:  4.5  Neck Extension: 5  Deltoid:  4/5  Biceps: 5/5   Triceps:  5  : 5/5   Iliopsoas: 4/5  Quadriceps:  4/5   Distal Lower Extremity: 5     Left Side Rheumatological Exam      Muscle Strength (0-5 scale):  Neck Flexion:  4.5  Neck Extension: 5  Deltoid:  5  Biceps: /5   Triceps:  5  :  5  Iliopsoas: 4.5/5  Quadriceps:  4.5/5   Distal Lower Extremity: 5     ROM intact  Bilateral hand swelling of MCP and PIP    Neurological: She is alert and oriented to person, place, and time. Gait normal.   Skin: Skin is warm and dry.   Guttron's papules on hands  Bilateral helitropic rash    "   Psychiatric: Mood, memory, affect and judgment normal.             Radiographs:-  Independent visualization of images done.   CXR (1/28) - clear lungs  PET scan - no pulmonary/GI activity.       Medication List with Changes/Refills   Current Medications    AMLODIPINE (NORVASC) 5 MG TABLET    Take 2 tablets (10 mg total) by mouth once daily.    CALCIUM CARBONATE (OS-ESTELA) 600 MG CALCIUM (1,500 MG) TAB    Take 600 mg by mouth 2 (two) times daily with meals.    FERROUS SULFATE (FEOSOL) 325 MG (65 MG IRON) TAB TABLET    Take 1 tablet (325 mg total) by mouth every 12 (twelve) hours.    FOLIC ACID (FOLVITE) 1 MG TABLET    TAKE 4 TABLETS(4 MG) VIA GTUBE EVERY DAY    LEVOCETIRIZINE (XYZAL) 5 MG TABLET    Take 1 tablet (5 mg total) by mouth every evening.    MYCOPHENOLATE (CELLCEPT) 500 MG TAB    Take 1 tablet (500 mg total) by mouth once daily.    PREDNISONE (DELTASONE) 1 MG TABLET    Take 1 tablet (1 mg total) by mouth once daily. Please take 1-10mg with 1-1mg for a total of 11mg to be taken daily    PREDNISONE (DELTASONE) 10 MG TABLET    Take 1 tablet (10 mg total) by mouth once daily.    VITAMIN D (VITAMIN D3) 1000 UNITS TAB    Take 1,000 Units by mouth once daily.   Discontinued Medications    ACETAMINOPHEN (TYLENOL) 325 MG TABLET    650 mg by Tube route every 4 (four) hours as needed for Pain.    AZELASTINE (ASTELIN) 137 MCG (0.1 %) NASAL SPRAY    1 spray (137 mcg total) by Nasal route 2 (two) times daily. for 7 days    FLUTICASONE PROPIONATE (FLONASE) 50 MCG/ACTUATION NASAL SPRAY        OMEPRAZOLE (PRILOSEC) 40 MG CAPSULE           Assessment/Plans:-   58 y.o.F with history of L sided infiltrating ductal carcinoma of the L breast (dx on Jan 2017), HTN, depression, gastritis, and recently diagnosed myositis (uncertain if it's autoimmune vs medication induced), present today for follow up because of concern about myositis flare.     Patient started on Atelizumab/Abraxane on 11/7/18.  Patient developed swelling of the  face on 1/4/19 and went to urgent care and given short course of prednisone 20 mg BID. On 1/15/19, patient seen in hem/onc clinic with c/o of pressure and tightness around neck. CT scan of chest and neck did not reveal any vascular compression. Started on prednisone 60 mg with taper. On 1/22/18 patient with c/o proximal muscle weakness. CPK found to be elevated around 4k. Patient admitted and given solumedrol 80 mg IV on 1/22/18. Last infusion of Atelizumab on 12/19/18. Last infusion of Abraxane on 1/3/19. Given Solumedrol 1g x 1 on 1/23/18. Seen by Dermatology on 1/24/18 and biopsy done of skin rash. Given distribution of rashes, there was concern for dermatomyositis. Patient started on Solumedrol 125 mg IV BID at that time.  Skin biopsy resulted consistent with dermatomyositis.     Labs: CPK and Aldolase normalized. +DARCY 1:640 speckled with negative profile.  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 quantTB: indeterminate, T-spot: negative     Spirometry: normal, normal diffusion capacity. FVC: 81%, DLCO: 114%     Patient exhibits signs of dermatomyositis (heliotropic rash and gottron's papules).   Dermatomyositis can be associated with malignancy.  MRI of LUE showed edema of the deltoid and biceps.  Exam with proximal muscle weakness: b/l deltoids 4/5, b/l iliopsoas 4.4/5. 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   Given +TIF1 gamma positivity, most likely dermatomyositis is from underlying malignancy.      Failed 2nd swallow eval on 2/6/19. PEG placed 2/7/2019.     Current medications:  -  prednisone 11mg   - IVIG Started Jan 2019 - now (next dose is Dec 6/7)  - folic acid daily 4mg    Esophageal biopsy - negative for viral infection.  Nonspecific chronic inflammation.    EGD/Colonoscopy (July 2019) - normal. PEG tube removed     Fiboscan done Aug 2019 - showed fatty liver with no scarring/damage.,     At this time, patient failed steroid tapering (myalgia and rash) and having flare with current treatment of Cellcept 500mg daily and prednisone 11mg.  Will increase steroid to 15mg daily until flare resolves.  Cellcept will be increased to 500mg BID.      Patient is an intermediate metabolizer based on TPMT.  Cannot start imuran.  Labs still neutropenic and thrombocytopenic at this time - uncertain of the cause (radiation induce BM fibrosis vs medication induce?)     Patient unable to tolerate steroid tapering.  Was started on Cellcept 500mg daily at the last visit, appears to be insufficient - will be increase in dosage (mild drop in leukopenia and elevation of AST).  Consideration for Rituxan as steroid sparring agent or increase IVIG to 3 weeks.     Vaccination completed - Prevnar and Shingles (completed Aug 2019) and flu vaccination for this season (Aug 2019)     Cellcept started (Sept 2019).  Currently on 500mg daily (due to leukopenia).     Currently having elevated inflammatory markers, worsening rash and arthralgia.  Myositis flare vs sinusitis/viral symptoms (+sick contacts)    Plan:  - Increase Cellcept 500mg to BID daily  - CBC, CMP, CPK, Aldolase, ESR, CRP to be done prior to next visit  - Increase prednisone to 15mg x 2 weeks and then taper down to 11mg until follow up  - continue folic acid 4mg daily  - continue muscle strengthening exercise daily  - continue PT   - 1200 mg dietary calcium and Vitamin D3 1000 mg daily  - continue IVIG as previously scheduled - scheduled for Dec.  Needs scheduling for Jan  - message/call immediately if worsening muscle weakness  - Disability paperwork  filled out and return back to patient - copy scan to file   - Information about rituxan provided to patient.  All questions answered    # RTC in 4-6 weeks

## 2019-12-04 NOTE — PROGRESS NOTES
58 year old female with dermatomyositis s/p PD1 inhibitor used for breast cancer  TIF1 gamma +    Low dose steroids didn't help    Rash+ muscle weakness+dysphagia     IVIG and steroids initial treatment  mtx causes LFT derangement,anemia and leukopenia     On IVIG  On cellcept 500 mg daily  On prednisone 11 mg     Imuran not an option since she is intermediate metaboliser     Last time she had flu like symptoms    So we didn't make any changes to meds    Rpt labs     Ck,aldolase nml  CRP nml  ESR 80  White count 2.10  H/h 11.8/36.1  AST 76  ALT nml    AST went up s/p cellcept   White count dropped to 2.10  H/h 11.8/36.1    Plan     Try 1000 mg cellcept and continue prednisone same dose and IVIG     In 4 weeks rpt CBC,CMP : If any worsening in leukopenia and transaminases then we will switch to rituxan    Increase prednisone to 15 mg             Answers for HPI/ROS submitted by the patient on 11/29/2019   fever: No  eye redness: No  headaches: No  shortness of breath: No  chest pain: No  trouble swallowing: No  diarrhea: No  constipation: Yes  unexpected weight change: No  genital sore: No  dysuria: No  During the last 3 days, have you had a skin rash?: Yes  Bruises or bleeds easily: No  cough: No

## 2019-12-09 ENCOUNTER — INFUSION (OUTPATIENT)
Dept: INFUSION THERAPY | Facility: HOSPITAL | Age: 59
End: 2019-12-09
Attending: INTERNAL MEDICINE
Payer: COMMERCIAL

## 2019-12-09 VITALS
DIASTOLIC BLOOD PRESSURE: 83 MMHG | HEIGHT: 65 IN | HEART RATE: 87 BPM | TEMPERATURE: 98 F | BODY MASS INDEX: 31.51 KG/M2 | WEIGHT: 189.13 LBS | SYSTOLIC BLOOD PRESSURE: 148 MMHG | RESPIRATION RATE: 18 BRPM

## 2019-12-09 DIAGNOSIS — M33.13 DERMATOMYOSITIS: Primary | ICD-10-CM

## 2019-12-09 DIAGNOSIS — R13.19 ESOPHAGEAL DYSPHAGIA: ICD-10-CM

## 2019-12-09 PROCEDURE — 25000003 PHARM REV CODE 250: Performed by: INTERNAL MEDICINE

## 2019-12-09 PROCEDURE — 96366 THER/PROPH/DIAG IV INF ADDON: CPT

## 2019-12-09 PROCEDURE — 63600175 PHARM REV CODE 636 W HCPCS: Performed by: INTERNAL MEDICINE

## 2019-12-09 PROCEDURE — A4216 STERILE WATER/SALINE, 10 ML: HCPCS | Performed by: INTERNAL MEDICINE

## 2019-12-09 PROCEDURE — 96375 TX/PRO/DX INJ NEW DRUG ADDON: CPT

## 2019-12-09 PROCEDURE — 96367 TX/PROPH/DG ADDL SEQ IV INF: CPT

## 2019-12-09 PROCEDURE — 96365 THER/PROPH/DIAG IV INF INIT: CPT

## 2019-12-09 PROCEDURE — S0028 INJECTION, FAMOTIDINE, 20 MG: HCPCS | Performed by: INTERNAL MEDICINE

## 2019-12-09 RX ORDER — SODIUM CHLORIDE 0.9 % (FLUSH) 0.9 %
10 SYRINGE (ML) INJECTION
Status: DISCONTINUED | OUTPATIENT
Start: 2019-12-09 | End: 2019-12-09 | Stop reason: HOSPADM

## 2019-12-09 RX ORDER — FAMOTIDINE 10 MG/ML
20 INJECTION INTRAVENOUS
Status: COMPLETED | OUTPATIENT
Start: 2019-12-09 | End: 2019-12-09

## 2019-12-09 RX ORDER — HEPARIN 100 UNIT/ML
500 SYRINGE INTRAVENOUS
Status: DISCONTINUED | OUTPATIENT
Start: 2019-12-09 | End: 2019-12-09 | Stop reason: HOSPADM

## 2019-12-09 RX ORDER — SODIUM CHLORIDE 0.9 % (FLUSH) 0.9 %
10 SYRINGE (ML) INJECTION
Status: CANCELLED | OUTPATIENT
Start: 2020-01-06

## 2019-12-09 RX ORDER — HEPARIN 100 UNIT/ML
500 SYRINGE INTRAVENOUS
Status: CANCELLED | OUTPATIENT
Start: 2020-01-06

## 2019-12-09 RX ORDER — FAMOTIDINE 10 MG/ML
20 INJECTION INTRAVENOUS
Status: CANCELLED | OUTPATIENT
Start: 2020-01-06

## 2019-12-09 RX ORDER — ACETAMINOPHEN 325 MG/1
650 TABLET ORAL
Status: COMPLETED | OUTPATIENT
Start: 2019-12-09 | End: 2019-12-09

## 2019-12-09 RX ORDER — ACETAMINOPHEN 325 MG/1
650 TABLET ORAL
Status: CANCELLED | OUTPATIENT
Start: 2020-01-06

## 2019-12-09 RX ADMIN — Medication 10 ML: at 12:12

## 2019-12-09 RX ADMIN — ACETAMINOPHEN 650 MG: 325 TABLET ORAL at 07:12

## 2019-12-09 RX ADMIN — HUMAN IMMUNOGLOBULIN G 85 G: 40 LIQUID INTRAVENOUS at 08:12

## 2019-12-09 RX ADMIN — HEPARIN 500 UNITS: 100 SYRINGE at 12:12

## 2019-12-09 RX ADMIN — SODIUM CHLORIDE: 0.9 INJECTION, SOLUTION INTRAVENOUS at 07:12

## 2019-12-09 RX ADMIN — FAMOTIDINE 20 MG: 10 INJECTION, SOLUTION INTRAVENOUS at 07:12

## 2019-12-09 RX ADMIN — DIPHENHYDRAMINE HYDROCHLORIDE 50 MG: 50 INJECTION, SOLUTION INTRAMUSCULAR; INTRAVENOUS at 07:12

## 2019-12-09 NOTE — PLAN OF CARE
1203-Patient tolerated treatment well, port was left accessed for treatment tomorrow. Discharged without complaints or S/S of adverse event. AVS given.  Instructed to call provider for any questions or concerns. Patient verbalized she will return in the morning for day 2.

## 2019-12-09 NOTE — PLAN OF CARE
0722-Labs , hx, and medications reviewed, patient is here for D1/2 of IVIG. Assessment completed. Discussed plan of care with patient. Patient in agreement. Chair reclined and warm blanket and snack offered.

## 2019-12-10 ENCOUNTER — TELEPHONE (OUTPATIENT)
Dept: PHARMACY | Facility: CLINIC | Age: 59
End: 2019-12-10

## 2019-12-10 ENCOUNTER — INFUSION (OUTPATIENT)
Dept: INFUSION THERAPY | Facility: HOSPITAL | Age: 59
End: 2019-12-10
Attending: INTERNAL MEDICINE
Payer: COMMERCIAL

## 2019-12-10 VITALS
DIASTOLIC BLOOD PRESSURE: 74 MMHG | TEMPERATURE: 98 F | HEART RATE: 84 BPM | SYSTOLIC BLOOD PRESSURE: 121 MMHG | RESPIRATION RATE: 18 BRPM

## 2019-12-10 DIAGNOSIS — R13.19 ESOPHAGEAL DYSPHAGIA: ICD-10-CM

## 2019-12-10 DIAGNOSIS — M33.13 DERMATOMYOSITIS: Primary | ICD-10-CM

## 2019-12-10 PROCEDURE — 25000003 PHARM REV CODE 250: Performed by: INTERNAL MEDICINE

## 2019-12-10 PROCEDURE — 63600175 PHARM REV CODE 636 W HCPCS: Performed by: INTERNAL MEDICINE

## 2019-12-10 PROCEDURE — S0028 INJECTION, FAMOTIDINE, 20 MG: HCPCS | Performed by: INTERNAL MEDICINE

## 2019-12-10 PROCEDURE — 96375 TX/PRO/DX INJ NEW DRUG ADDON: CPT

## 2019-12-10 PROCEDURE — 96367 TX/PROPH/DG ADDL SEQ IV INF: CPT

## 2019-12-10 PROCEDURE — 96366 THER/PROPH/DIAG IV INF ADDON: CPT

## 2019-12-10 PROCEDURE — 96365 THER/PROPH/DIAG IV INF INIT: CPT

## 2019-12-10 RX ORDER — ACETAMINOPHEN 325 MG/1
650 TABLET ORAL
Status: COMPLETED | OUTPATIENT
Start: 2019-12-10 | End: 2019-12-10

## 2019-12-10 RX ORDER — ACETAMINOPHEN 325 MG/1
650 TABLET ORAL
Status: CANCELLED | OUTPATIENT
Start: 2020-01-07

## 2019-12-10 RX ORDER — SODIUM CHLORIDE 0.9 % (FLUSH) 0.9 %
10 SYRINGE (ML) INJECTION
Status: DISCONTINUED | OUTPATIENT
Start: 2019-12-10 | End: 2019-12-10 | Stop reason: HOSPADM

## 2019-12-10 RX ORDER — SODIUM CHLORIDE 0.9 % (FLUSH) 0.9 %
10 SYRINGE (ML) INJECTION
Status: CANCELLED | OUTPATIENT
Start: 2020-01-07

## 2019-12-10 RX ORDER — HEPARIN 100 UNIT/ML
500 SYRINGE INTRAVENOUS
Status: DISCONTINUED | OUTPATIENT
Start: 2019-12-10 | End: 2019-12-10 | Stop reason: HOSPADM

## 2019-12-10 RX ORDER — HEPARIN 100 UNIT/ML
500 SYRINGE INTRAVENOUS
Status: CANCELLED | OUTPATIENT
Start: 2020-01-07

## 2019-12-10 RX ORDER — FAMOTIDINE 10 MG/ML
20 INJECTION INTRAVENOUS
Status: CANCELLED | OUTPATIENT
Start: 2020-01-07

## 2019-12-10 RX ORDER — FAMOTIDINE 10 MG/ML
20 INJECTION INTRAVENOUS
Status: COMPLETED | OUTPATIENT
Start: 2019-12-10 | End: 2019-12-10

## 2019-12-10 RX ADMIN — FAMOTIDINE 20 MG: 10 INJECTION, SOLUTION INTRAVENOUS at 07:12

## 2019-12-10 RX ADMIN — SODIUM CHLORIDE: 0.9 INJECTION, SOLUTION INTRAVENOUS at 07:12

## 2019-12-10 RX ADMIN — HEPARIN SODIUM (PORCINE) LOCK FLUSH IV SOLN 100 UNIT/ML 500 UNITS: 100 SOLUTION at 12:12

## 2019-12-10 RX ADMIN — DIPHENHYDRAMINE HYDROCHLORIDE 50 MG: 50 INJECTION, SOLUTION INTRAMUSCULAR; INTRAVENOUS at 07:12

## 2019-12-10 RX ADMIN — ACETAMINOPHEN 650 MG: 325 TABLET ORAL at 07:12

## 2019-12-10 RX ADMIN — HUMAN IMMUNOGLOBULIN G 85 G: 40 LIQUID INTRAVENOUS at 08:12

## 2019-12-10 NOTE — PLAN OF CARE
Patient tolerated IVIG with no complications. VSS. NAD. Pt instructed to call MD with any problems. Pt discharged home independently. Pt to RTC on 1/6/20 and 1/7/20 for IVIG. AVS given.

## 2019-12-16 ENCOUNTER — TELEPHONE (OUTPATIENT)
Dept: HEMATOLOGY/ONCOLOGY | Facility: CLINIC | Age: 59
End: 2019-12-16

## 2019-12-16 DIAGNOSIS — C77.9 REGIONAL LYMPH NODE METASTASIS PRESENT: ICD-10-CM

## 2019-12-16 DIAGNOSIS — Z17.1 MALIGNANT NEOPLASM OF UPPER-OUTER QUADRANT OF LEFT BREAST IN FEMALE, ESTROGEN RECEPTOR NEGATIVE: Primary | Chronic | ICD-10-CM

## 2019-12-16 DIAGNOSIS — C50.412 MALIGNANT NEOPLASM OF UPPER-OUTER QUADRANT OF LEFT BREAST IN FEMALE, ESTROGEN RECEPTOR NEGATIVE: Primary | Chronic | ICD-10-CM

## 2019-12-17 ENCOUNTER — PATIENT MESSAGE (OUTPATIENT)
Dept: RHEUMATOLOGY | Facility: CLINIC | Age: 59
End: 2019-12-17

## 2019-12-18 ENCOUNTER — LAB VISIT (OUTPATIENT)
Dept: LAB | Facility: HOSPITAL | Age: 59
End: 2019-12-18
Attending: INTERNAL MEDICINE

## 2019-12-18 ENCOUNTER — OFFICE VISIT (OUTPATIENT)
Dept: RHEUMATOLOGY | Facility: CLINIC | Age: 59
End: 2019-12-18
Payer: COMMERCIAL

## 2019-12-18 VITALS
HEART RATE: 93 BPM | DIASTOLIC BLOOD PRESSURE: 89 MMHG | WEIGHT: 190.69 LBS | HEIGHT: 65 IN | SYSTOLIC BLOOD PRESSURE: 156 MMHG | BODY MASS INDEX: 31.77 KG/M2

## 2019-12-18 DIAGNOSIS — Z17.1 MALIGNANT NEOPLASM OF UPPER-OUTER QUADRANT OF LEFT BREAST IN FEMALE, ESTROGEN RECEPTOR NEGATIVE: Chronic | ICD-10-CM

## 2019-12-18 DIAGNOSIS — Z74.09 IMPAIRED FUNCTIONAL MOBILITY AND ENDURANCE: ICD-10-CM

## 2019-12-18 DIAGNOSIS — M33.13 DERMATOMYOSITIS: ICD-10-CM

## 2019-12-18 DIAGNOSIS — C50.412 MALIGNANT NEOPLASM OF UPPER-OUTER QUADRANT OF LEFT BREAST IN FEMALE, ESTROGEN RECEPTOR NEGATIVE: Chronic | ICD-10-CM

## 2019-12-18 DIAGNOSIS — Z79.899 IMMUNOSUPPRESSION DUE TO DRUG THERAPY: ICD-10-CM

## 2019-12-18 DIAGNOSIS — M35.9 POLYMYOSITIS ASSOCIATED WITH AUTOIMMUNE DISEASE: ICD-10-CM

## 2019-12-18 DIAGNOSIS — C77.9 REGIONAL LYMPH NODE METASTASIS PRESENT: ICD-10-CM

## 2019-12-18 DIAGNOSIS — M33.20 POLYMYOSITIS ASSOCIATED WITH AUTOIMMUNE DISEASE: ICD-10-CM

## 2019-12-18 DIAGNOSIS — R21 RASH: Primary | ICD-10-CM

## 2019-12-18 DIAGNOSIS — D84.821 IMMUNOSUPPRESSION DUE TO DRUG THERAPY: ICD-10-CM

## 2019-12-18 LAB
ALBUMIN SERPL BCP-MCNC: 3.3 G/DL (ref 3.5–5.2)
ALP SERPL-CCNC: 55 U/L (ref 55–135)
ALT SERPL W/O P-5'-P-CCNC: 24 U/L (ref 10–44)
ANION GAP SERPL CALC-SCNC: 7 MMOL/L (ref 8–16)
AST SERPL-CCNC: 53 U/L (ref 10–40)
BILIRUB SERPL-MCNC: 0.3 MG/DL (ref 0.1–1)
BUN SERPL-MCNC: 9 MG/DL (ref 6–20)
CALCIUM SERPL-MCNC: 9.2 MG/DL (ref 8.7–10.5)
CHLORIDE SERPL-SCNC: 101 MMOL/L (ref 95–110)
CK SERPL-CCNC: 91 U/L (ref 20–180)
CO2 SERPL-SCNC: 27 MMOL/L (ref 23–29)
CREAT SERPL-MCNC: 0.8 MG/DL (ref 0.5–1.4)
CRP SERPL-MCNC: 2.8 MG/L (ref 0–8.2)
ERYTHROCYTE [DISTWIDTH] IN BLOOD BY AUTOMATED COUNT: 16 % (ref 11.5–14.5)
ERYTHROCYTE [SEDIMENTATION RATE] IN BLOOD BY WESTERGREN METHOD: 119 MM/HR (ref 0–36)
EST. GFR  (AFRICAN AMERICAN): >60 ML/MIN/1.73 M^2
EST. GFR  (NON AFRICAN AMERICAN): >60 ML/MIN/1.73 M^2
GLUCOSE SERPL-MCNC: 98 MG/DL (ref 70–110)
HCT VFR BLD AUTO: 38.9 % (ref 37–48.5)
HGB BLD-MCNC: 11.7 G/DL (ref 12–16)
IMM GRANULOCYTES # BLD AUTO: 0.01 K/UL (ref 0–0.04)
MCH RBC QN AUTO: 27.2 PG (ref 27–31)
MCHC RBC AUTO-ENTMCNC: 30.1 G/DL (ref 32–36)
MCV RBC AUTO: 91 FL (ref 82–98)
NEUTROPHILS # BLD AUTO: 0.9 K/UL (ref 1.8–7.7)
PLATELET # BLD AUTO: 139 K/UL (ref 150–350)
PMV BLD AUTO: 9.6 FL (ref 9.2–12.9)
POTASSIUM SERPL-SCNC: 3.5 MMOL/L (ref 3.5–5.1)
PROT SERPL-MCNC: 9.4 G/DL (ref 6–8.4)
RBC # BLD AUTO: 4.3 M/UL (ref 4–5.4)
SODIUM SERPL-SCNC: 135 MMOL/L (ref 136–145)
WBC # BLD AUTO: 2.04 K/UL (ref 3.9–12.7)

## 2019-12-18 PROCEDURE — 99999 PR PBB SHADOW E&M-EST. PATIENT-LVL IV: CPT | Mod: PBBFAC,,, | Performed by: STUDENT IN AN ORGANIZED HEALTH CARE EDUCATION/TRAINING PROGRAM

## 2019-12-18 PROCEDURE — 3079F PR MOST RECENT DIASTOLIC BLOOD PRESSURE 80-89 MM HG: ICD-10-PCS | Mod: CPTII,S$GLB,, | Performed by: STUDENT IN AN ORGANIZED HEALTH CARE EDUCATION/TRAINING PROGRAM

## 2019-12-18 PROCEDURE — 99214 OFFICE O/P EST MOD 30 MIN: CPT | Mod: S$GLB,,, | Performed by: STUDENT IN AN ORGANIZED HEALTH CARE EDUCATION/TRAINING PROGRAM

## 2019-12-18 PROCEDURE — 99214 PR OFFICE/OUTPT VISIT, EST, LEVL IV, 30-39 MIN: ICD-10-PCS | Mod: S$GLB,,, | Performed by: STUDENT IN AN ORGANIZED HEALTH CARE EDUCATION/TRAINING PROGRAM

## 2019-12-18 PROCEDURE — 85027 COMPLETE CBC AUTOMATED: CPT

## 2019-12-18 PROCEDURE — 86140 C-REACTIVE PROTEIN: CPT

## 2019-12-18 PROCEDURE — 85652 RBC SED RATE AUTOMATED: CPT

## 2019-12-18 PROCEDURE — 80053 COMPREHEN METABOLIC PANEL: CPT

## 2019-12-18 PROCEDURE — 3008F BODY MASS INDEX DOCD: CPT | Mod: CPTII,S$GLB,, | Performed by: STUDENT IN AN ORGANIZED HEALTH CARE EDUCATION/TRAINING PROGRAM

## 2019-12-18 PROCEDURE — 99999 PR PBB SHADOW E&M-EST. PATIENT-LVL IV: ICD-10-PCS | Mod: PBBFAC,,, | Performed by: STUDENT IN AN ORGANIZED HEALTH CARE EDUCATION/TRAINING PROGRAM

## 2019-12-18 PROCEDURE — 3008F PR BODY MASS INDEX (BMI) DOCUMENTED: ICD-10-PCS | Mod: CPTII,S$GLB,, | Performed by: STUDENT IN AN ORGANIZED HEALTH CARE EDUCATION/TRAINING PROGRAM

## 2019-12-18 PROCEDURE — 3077F PR MOST RECENT SYSTOLIC BLOOD PRESSURE >= 140 MM HG: ICD-10-PCS | Mod: CPTII,S$GLB,, | Performed by: STUDENT IN AN ORGANIZED HEALTH CARE EDUCATION/TRAINING PROGRAM

## 2019-12-18 PROCEDURE — 36415 COLL VENOUS BLD VENIPUNCTURE: CPT

## 2019-12-18 PROCEDURE — 3077F SYST BP >= 140 MM HG: CPT | Mod: CPTII,S$GLB,, | Performed by: STUDENT IN AN ORGANIZED HEALTH CARE EDUCATION/TRAINING PROGRAM

## 2019-12-18 PROCEDURE — 3079F DIAST BP 80-89 MM HG: CPT | Mod: CPTII,S$GLB,, | Performed by: STUDENT IN AN ORGANIZED HEALTH CARE EDUCATION/TRAINING PROGRAM

## 2019-12-18 PROCEDURE — 82550 ASSAY OF CK (CPK): CPT

## 2019-12-18 RX ORDER — CLOBETASOL PROPIONATE 0.5 MG/G
CREAM TOPICAL 2 TIMES DAILY
Qty: 45 G | Refills: 1 | Status: SHIPPED | OUTPATIENT
Start: 2019-12-18 | End: 2020-06-17 | Stop reason: SDUPTHER

## 2019-12-18 ASSESSMENT — ROUTINE ASSESSMENT OF PATIENT INDEX DATA (RAPID3)
WHEN YOU AWAKENED IN THE MORNING OVER THE LAST WEEK, PLEASE INDICATE THE AMOUNT OF TIME IT TAKES UNTIL YOU ARE AS LIMBER AS YOU WILL BE FOR THE DAY: 1HR
PAIN SCORE: 3
FATIGUE SCORE: 1.5
PSYCHOLOGICAL DISTRESS SCORE: 3.3
AM STIFFNESS SCORE: 1, YES
TOTAL RAPID3 SCORE: 2.22
PATIENT GLOBAL ASSESSMENT SCORE: 2
MDHAQ FUNCTION SCORE: .5

## 2019-12-18 NOTE — PROGRESS NOTES
Rapid3 Question Responses and Scores 12/17/2019   MDHAQ Score 0.5   Psychologic Score 3.3   Pain Score 3   When you awakened in the morning OVER THE LAST WEEK, did you feel stiff? Yes   If Yes, please indicate the number of hours until you are as limber as you will be for the day 1   Fatigue Score 1.5   Global Health Score 2   RAPID3 Score 2.22

## 2019-12-18 NOTE — PROGRESS NOTES
59 year old female with dermatomyositis s/p PD1 inhibitor used for breast cancer  TIF1 gamma +    Low dose steroids didn't help    Rash+ muscle weakness+dysphagia     IVIG and steroids initial treatment  mtx causes LFT derangement,anemia and leukopenia     On IVIG  On cellcept 500 mg twice daily for a week  On prednisone 15 mg     Imuran not an option since she is intermediate metaboliser     Leukopenia 2.04  Anemia stable  plts have again dropped to 139  AST better  ALT same and stable   Last ck,aldolase nml  ESR 80  CRP nml    Rash : heliotrope rash has gotten better  Gottrons papules not better  New rash on the thighs and elbows     Continue IVIG : Change to 1 mg/kg bw every 2 weeks   Continue same dose of cellcept  Dermatology  CBC,CMP rpt in 4 weeks       Answers for HPI/ROS submitted by the patient on 12/17/2019   fever: No  eye redness: Yes  headaches: No  shortness of breath: No  chest pain: No  trouble swallowing: No  diarrhea: No  constipation: Yes  unexpected weight change: No  genital sore: No  dysuria: No  During the last 3 days, have you had a skin rash?: Yes  Bruises or bleeds easily: No  cough: No

## 2019-12-19 ENCOUNTER — TELEPHONE (OUTPATIENT)
Dept: ADMINISTRATIVE | Facility: OTHER | Age: 59
End: 2019-12-19

## 2019-12-19 NOTE — TELEPHONE ENCOUNTER
Left voice message for patient to return call to schedule appointment from referral to Dermatology department.  Brynn BULLARD [phone]

## 2019-12-20 ENCOUNTER — INITIAL CONSULT (OUTPATIENT)
Dept: DERMATOLOGY | Facility: CLINIC | Age: 59
End: 2019-12-20
Payer: COMMERCIAL

## 2019-12-20 DIAGNOSIS — L29.9 PRURITUS: ICD-10-CM

## 2019-12-20 DIAGNOSIS — L98.9 DISEASE OF SKIN AND SUBCUTANEOUS TISSUE: Primary | ICD-10-CM

## 2019-12-20 PROCEDURE — 99999 PR PBB SHADOW E&M-EST. PATIENT-LVL III: ICD-10-PCS | Mod: PBBFAC,,, | Performed by: DERMATOLOGY

## 2019-12-20 PROCEDURE — 88305 TISSUE EXAM BY PATHOLOGIST: CPT | Performed by: PATHOLOGY

## 2019-12-20 PROCEDURE — 99203 PR OFFICE/OUTPT VISIT, NEW, LEVL III, 30-44 MIN: ICD-10-PCS | Mod: 25,S$GLB,, | Performed by: DERMATOLOGY

## 2019-12-20 PROCEDURE — 11104 PUNCH BX SKIN SINGLE LESION: CPT | Mod: S$GLB,,, | Performed by: DERMATOLOGY

## 2019-12-20 PROCEDURE — 88305 TISSUE EXAM BY PATHOLOGIST: CPT | Mod: 26,,, | Performed by: PATHOLOGY

## 2019-12-20 PROCEDURE — 99203 OFFICE O/P NEW LOW 30 MIN: CPT | Mod: 25,S$GLB,, | Performed by: DERMATOLOGY

## 2019-12-20 PROCEDURE — 99999 PR PBB SHADOW E&M-EST. PATIENT-LVL III: CPT | Mod: PBBFAC,,, | Performed by: DERMATOLOGY

## 2019-12-20 PROCEDURE — 11104 PR PUNCH BIOPSY, SKIN, SINGLE LESION: ICD-10-PCS | Mod: S$GLB,,, | Performed by: DERMATOLOGY

## 2019-12-20 PROCEDURE — 88305 TISSUE EXAM BY PATHOLOGIST: ICD-10-PCS | Mod: 26,,, | Performed by: PATHOLOGY

## 2019-12-20 RX ORDER — TRIAMCINOLONE ACETONIDE 0.25 MG/G
CREAM TOPICAL
Qty: 80 G | Refills: 3 | Status: SHIPPED | OUTPATIENT
Start: 2019-12-20 | End: 2021-03-25 | Stop reason: SDUPTHER

## 2019-12-20 NOTE — PROGRESS NOTES
Subjective:       Patient ID:  Ermelinda Verde is a 59 y.o. female who presents for   Chief Complaint   Patient presents with    Rash     Rash  - Initial  Affected locations: face, left upper leg, right upper leg, left arm and right arm  Duration: 2 weeks  Signs / symptoms: itching  Aggravated by: nothing  Relieving factors/Treatments tried: nothing      Pt presents today for rash on the body x 2 weeks. States that the rash itches but denies any burning or bleeding.   No previous or current treatment to rash. Was prescribed clobetasol cream but it is currently on back order at the pharmacy.  Denies any other concerns on skin today.     Has dermatomyositis which pt states was diagnosed in Jan this yr. Improved after steroids, but recently started flaring again.  Currently on 15mg prednisone daily. Started cellcept in Sept and just doubled the dose a few weeks ago. Pt states her rheumatologist wants to make sure this rash isn't a medication reaction.    Per rheum note:  IVIG and steroids initial treatment  mtx causes LFT derangement,anemia and leukopenia   On IVIG  On cellcept 500 mg twice daily for a week  On prednisone 15 mg   Imuran not an option since she is intermediate metaboliser      Review of Systems   Constitutional: Negative for fever, chills, weight loss, weight gain, fatigue, night sweats and malaise.   Skin: Positive for rash. Negative for daily sunscreen use, activity-related sunscreen use and recent sunburn.   Hematologic/Lymphatic: Does not bruise/bleed easily.        Objective:    Physical Exam   Constitutional: She appears well-developed and well-nourished. No distress.   Neurological: She is alert and oriented to person, place, and time. She is not disoriented.   Psychiatric: She has a normal mood and affect.   Skin:   Areas Examined (abnormalities noted in diagram):   Scalp / Hair Palpated and Inspected  Head / Face Inspection Performed  Neck Inspection Performed  Chest / Axilla Inspection  Performed  Abdomen Inspection Performed  Genitals / Buttocks / Groin Inspection Performed  Back Inspection Performed  RUE Inspected  LUE Inspection Performed  RLE Inspected  LLE Inspection Performed  Nails and Digits Inspection Performed                       Diagram Legend     Erythematous scaling macule/papule c/w actinic keratosis       Vascular papule c/w angioma      Pigmented verrucoid papule/plaque c/w seborrheic keratosis      Yellow umbilicated papule c/w sebaceous hyperplasia      Irregularly shaped tan macule c/w lentigo     1-2 mm smooth white papules consistent with Milia      Movable subcutaneous cyst with punctum c/w epidermal inclusion cyst      Subcutaneous movable cyst c/w pilar cyst      Firm pink to brown papule c/w dermatofibroma      Pedunculated fleshy papule(s) c/w skin tag(s)      Evenly pigmented macule c/w junctional nevus     Mildly variegated pigmented, slightly irregular-bordered macule c/w mildly atypical nevus      Flesh colored to evenly pigmented papule c/w intradermal nevus       Pink pearly papule/plaque c/w basal cell carcinoma      Erythematous hyperkeratotic cursted plaque c/w SCC      Surgical scar with no sign of skin cancer recurrence      Open and closed comedones      Inflammatory papules and pustules      Verrucoid papule consistent consistent with wart     Erythematous eczematous patches and plaques     Dystrophic onycholytic nail with subungual debris c/w onychomycosis     Umbilicated papule    Erythematous-base heme-crusted tan verrucoid plaque consistent with inflamed seborrheic keratosis     Erythematous Silvery Scaling Plaque c/w Psoriasis     See annotation      Assessment / Plan:      Disease of skin and subcutaneous tissue  Punch biopsy procedure note:  Punch biopsy performed after verbal consent obtained. Area marked and prepped with alcohol. Approximately 1cc of 1% lidocaine with epinephrine injected. 4 mm disposable punch used to remove lesion. Hemostasis  obtained and biopsy site closed with 1 - 2 Prolene sutures. Wound care instructions reviewed with patient and handout given.    -     Specimen to Pathology, Dermatology  Pathology Orders:     Normal Orders This Visit    Specimen to Pathology, Dermatology     Comments:    Number of Specimens:->1  ------------------------->-------------------------  Spec 1 Procedure:->Biopsy  Spec 1 Clinical Impression:->r/o dermatomyositis vs. drug  eruption vs. other  Spec 1 Source:->R thigh    Questions:    Procedure Type:  Dermatology and skin neoplasms    Number of Specimens:  1    ------------------------:  -------------------------    Spec 1 Procedure:  Biopsy    Spec 1 Clinical Impression:  r/o dermatomyositis vs. drug eruption vs. other    Spec 1 Source:  R thigh        Rash around eyes appears to be heliotrope rash from dermatomyositis, and she also has Gottron's papules overlying knuckles. Rash on thigh biopsied today. Will prescribe TAC 0.025% for pt to use on face, and she can use the clobetasol cream on the legs, elbows, hands when it comes in.    Pruritus  -     triamcinolone acetonide 0.025% (KENALOG) 0.025 % cream; AAA on face BID x 1-2 wks then prn flares only  Dispense: 80 g; Refill: 3      Follow up in about 2 weeks (around 1/3/2020) for suture removal and biopsy results.

## 2019-12-20 NOTE — PATIENT INSTRUCTIONS
"Punch Biopsy Wound Care    Your doctor has performed a punch biopsy today.  A band aid and antibiotic ointment has been placed over the site.  This should remain in place for 24 hours.  It is recommended that you keep the area dry for the first 24 hours.  After 24 hours, you may remove the band aid and wash the area with warm soap and water and apply Vaseline jelly.  Many patients prefer to use Neosporin or Bacitracin ointment.  This is acceptable; however know that you can develop an allergy to this medication even if you have used it safely for years.  It is important to keep the area moist.  Letting it dry out and get air slows healing time, will worsen the scar, and make it more difficult to remove the stitches if they were placed.  Band aid is optional after first 24 hours.      If you notice increasing redness, tenderness, pain, or yellow drainage at the biopsy or surgical site, please notify your doctor.  These are signs of an infection.    If your biopsy/surgical site is bleeding, apply firm pressure for 15 minutes straight.  Repeat for another 15 minutes, if it is still bleeding.   If the surgical site continues to bleed, then please contact your doctor.      For [facility] users:   You will receive a [facility] notification after the pathologist has finished reviewing your biopsy specimen. Pathology results, however, will not be released online so you will see a "no content" message. Once your dermatologist reviews and clinically correlates your biopsy results, you will either receive a letter in the mail with the results of a phone call from your doctor's office if further explanation or treatment is warranted.       [address] [place] [address]/ [phone] FAX/ [email or web address]       Summer Sun Protection      The [facility]at[facility] would like to remind you of the importance of sun protection all year round and particularly during the summer when the suns " rays are the strongest. It has been proven that both acute and chronic sun exposure damages our cells and leads to skin cancer. Beyond skin cancer, the sun causes 90% of the symptoms of pre-mature skin aging, including wrinkles, lentigines (brown spots), and thin, easily bruised skin. Proper sun protection can help prevent these unwanted conditions.    Many patients report that the dont go in the sun. It has been shown that the average person receives 18 hours of incidental sun exposure per week during activities such as walking through parking lots, driving, or sitting next to windows. This accumulates to several bad sunburns per year!    In choosing sunscreen, you want one that protects against both UVA and UVB rays. It is recommended that you use one of SPF 30 or higher. It is important to apply the sunscreen about 20 minutes prior to sun exposure. Most sunscreens are chemical sunscreens and a reaction must take place in the skin so that they are effective. If they are applied and then you are immediately exposed to the sun or start sweating, this reaction has not had time to take place and you are therefore unprotected. Sunscreen needs to be reapplied every 2 hours if you are participating in water sports or sweating. We recommend Elta MD or Neutrogena Ultra Sheer Dry Touch SPF 55 for daily use; however there are many options and it is most important for you to find one that you will use on a consistent basis.    If you have sensitive skin, you may do best with a sunscreen that contains only physical blockers such as titanium dioxide or zinc oxide. These are typically thicker and harder to apply, however they afford very good protection. Neutrogena Sensitive Skin, Blue Lizard Sensitive Skin (pink top) or Neutrogena Pure and Free are popular ones.     Aside from sunscreen, clothes with UV protection, wide brimmed hats, and sunglasses are other means of sun protection that we recommend.                         [facility]  [address]  [facility] [address]  Dept: [phone]  Dept Fax: [phone]

## 2019-12-20 NOTE — LETTER
December 22, 2019      Clarissa Swift MD  [address]  [facility] [address]           [facility]  [address]  [facility] [address]  Phone: [phone]  Fax: [phone]          Patient: Ermelinda Verde   MR Number: [id number]   YOB: 1960   Date of Visit: 12/20/2019       Dear Dr. Clarisas Swift:    Thank you for referring Ermelinda Verde to me for evaluation. Attached you will find relevant portions of my assessment and plan of care.    If you have questions, please do not hesitate to call me. I look forward to following Ermelinda Verde along with you.    Sincerely,    Angela Kan MD    Enclosure  CC:  No Recipients    If you would like to receive this communication electronically, please contact [email or web address] or [phone] to request more information on [employer] Link access.    For providers and/or their staff who would like to refer a patient to [facility], please contact us through our one-stop-shop provider referral line, [facility], at [phone].    If you feel you have received this communication in error or would no longer like to receive these types of communications, please e-mail [email or web address]

## 2019-12-23 NOTE — PROGRESS NOTES
Subjective:       Patient ID: Ermelinda Verde is a 59 y.o. female.    Chief Complaint: No chief complaint on file.    HPI Mrs Verde is  a very pleasant 58-year-old -American female who returns for a diagnosis of recurrent triple negative left breast cancer.    She is S/P 3 cycles of nab-paclitaxel and Atezolizumab (last treatment in January 2019).    Her treatment was complicated by the development of dermatomyositis which resulted in the lengthy hospitalization from January 22nd to February 13th.  She is followed by Rheumatology and has been treated with steroids, IVIG, and low-dose methotrexate.     currently she is on CellCept  1000 mg and prednisone 15 mg.  She also gets IVIG monthly.  She has had ongoing problems with her myositis and some recent pain in her shoulders and neck.  She also has ongoing problems with skin changes.    Her swallowing has been good.  She has no shortness of breath.  Her bowel function has been sluggish.          Onc History:  She developed a palpable abnormality in her left breast in January 2017 which she noted on self-examination.  A diagnostic mammogram on January 19 showed a greater than 1 cm nodule in the upper outer portion of left breast.  By ultrasound this was lobulated and hypoechoic measuring 1.75 x 1.51 x 1.96 cm.     On January 24, 2017 a core needle biopsy was performed which showed infiltrating ductal carcinoma, high grade.  The tumor was ER negative, DC negative, and HER-2 negative.  A follow-up ultrasound on December 6 showed 2.5 x 2.2 x 1.5 cm left breast mass.  There was no abnormality noted in the left axilla.     She underwent sentinel lymph node biopsy on February 22.  That showed 4 negative lymph nodes.     She had 4 cycles of  Wilbur-adjuvantTaxotere and Cytoxan completed  on 5/9/17.     On June 26 she underwent left mastectomy.  That revealed 2 foci of invasive high-grade carcinoma measuring 14 mm and 1.5 mm.  Margins were negative.    She completed 4  cycles of adjuvant Adriamycin on September 12, 2017.      In October 2018, she developed some left supraclavicular lymphadenopathy which turned out to be recurrence.     A fine-needle aspirate of the lymph node was performed on October 19th.  That showed metastatic carcinoma consistent with breast primary which was ER negative, MO negative and HER 2-negative.    She then received 3 cycles of Nab paclitaxel and atezolizumab.  She last received treatment on January 3, 2019.    PET-CT in March showed complete response.    She completed consolidation radiation therapy in May 2019.  Review of Systems   Constitutional: Negative for appetite change and unexpected weight change.   Eyes: Negative for visual disturbance.   Respiratory: Negative for cough and shortness of breath.    Cardiovascular: Negative for chest pain.   Gastrointestinal: Negative for abdominal pain and diarrhea.   Genitourinary: Negative for frequency.   Musculoskeletal: Negative for back pain.   Skin: Positive for rash.   Neurological: Negative for headaches.   Hematological: Negative for adenopathy.   Psychiatric/Behavioral: The patient is not nervous/anxious.        Objective:      Physical Exam   Constitutional: She is oriented to person, place, and time. She appears well-developed and well-nourished. No distress.   HENT:   Mouth/Throat: No oropharyngeal exudate.   Cardiovascular: Normal rate and regular rhythm.   Pulmonary/Chest: Effort normal and breath sounds normal.   Abdominal: Soft. She exhibits no mass. There is no tenderness.   Lymphadenopathy:     She has no cervical adenopathy.   Neurological: She is alert and oriented to person, place, and time.   Psychiatric: She has a normal mood and affect. Her behavior is normal. Thought content normal.   Vitals reviewed.      Assessment:     CT scans-  No evidence recurrent disease  1. Malignant neoplasm of upper-outer quadrant of left breast in female, estrogen receptor negative        Plan:         return in 3 months with repeat scans and blood work.

## 2019-12-24 ENCOUNTER — HOSPITAL ENCOUNTER (OUTPATIENT)
Dept: RADIOLOGY | Facility: HOSPITAL | Age: 59
Discharge: HOME | End: 2019-12-24
Attending: INTERNAL MEDICINE
Payer: COMMERCIAL

## 2019-12-24 ENCOUNTER — OFFICE VISIT (OUTPATIENT)
Dept: HEMATOLOGY/ONCOLOGY | Facility: CLINIC | Age: 59
End: 2019-12-24
Payer: COMMERCIAL

## 2019-12-24 VITALS
WEIGHT: 186.94 LBS | RESPIRATION RATE: 16 BRPM | DIASTOLIC BLOOD PRESSURE: 86 MMHG | OXYGEN SATURATION: 99 % | SYSTOLIC BLOOD PRESSURE: 155 MMHG | HEART RATE: 97 BPM | HEIGHT: 65 IN | BODY MASS INDEX: 31.14 KG/M2 | TEMPERATURE: 98 F

## 2019-12-24 DIAGNOSIS — C77.9 REGIONAL LYMPH NODE METASTASIS PRESENT: ICD-10-CM

## 2019-12-24 DIAGNOSIS — Z17.1 MALIGNANT NEOPLASM OF UPPER-OUTER QUADRANT OF LEFT BREAST IN FEMALE, ESTROGEN RECEPTOR NEGATIVE: ICD-10-CM

## 2019-12-24 DIAGNOSIS — Z17.1 MALIGNANT NEOPLASM OF UPPER-OUTER QUADRANT OF LEFT BREAST IN FEMALE, ESTROGEN RECEPTOR NEGATIVE: Primary | Chronic | ICD-10-CM

## 2019-12-24 DIAGNOSIS — C50.412 MALIGNANT NEOPLASM OF UPPER-OUTER QUADRANT OF LEFT BREAST IN FEMALE, ESTROGEN RECEPTOR NEGATIVE: Primary | Chronic | ICD-10-CM

## 2019-12-24 DIAGNOSIS — C50.412 MALIGNANT NEOPLASM OF UPPER-OUTER QUADRANT OF LEFT BREAST IN FEMALE, ESTROGEN RECEPTOR NEGATIVE: Chronic | ICD-10-CM

## 2019-12-24 DIAGNOSIS — C50.412 MALIGNANT NEOPLASM OF UPPER-OUTER QUADRANT OF LEFT BREAST IN FEMALE, ESTROGEN RECEPTOR NEGATIVE: ICD-10-CM

## 2019-12-24 DIAGNOSIS — Z17.1 MALIGNANT NEOPLASM OF UPPER-OUTER QUADRANT OF LEFT BREAST IN FEMALE, ESTROGEN RECEPTOR NEGATIVE: Chronic | ICD-10-CM

## 2019-12-24 PROCEDURE — 3079F DIAST BP 80-89 MM HG: CPT | Mod: CPTII,S$GLB,, | Performed by: INTERNAL MEDICINE

## 2019-12-24 PROCEDURE — 3008F BODY MASS INDEX DOCD: CPT | Mod: CPTII,S$GLB,, | Performed by: INTERNAL MEDICINE

## 2019-12-24 PROCEDURE — 71260 CT THORAX DX C+: CPT | Mod: 26,,, | Performed by: RADIOLOGY

## 2019-12-24 PROCEDURE — 74177 CT ABD & PELVIS W/CONTRAST: CPT | Mod: 26,,, | Performed by: RADIOLOGY

## 2019-12-24 PROCEDURE — 3077F SYST BP >= 140 MM HG: CPT | Mod: CPTII,S$GLB,, | Performed by: INTERNAL MEDICINE

## 2019-12-24 PROCEDURE — 25500020 PHARM REV CODE 255: Performed by: INTERNAL MEDICINE

## 2019-12-24 PROCEDURE — 74177 CT CHEST ABDOMEN PELVIS WITH CONTRAST (XPD): ICD-10-PCS | Mod: 26,,, | Performed by: RADIOLOGY

## 2019-12-24 PROCEDURE — 74177 CT ABD & PELVIS W/CONTRAST: CPT | Mod: TC

## 2019-12-24 PROCEDURE — 3008F PR BODY MASS INDEX (BMI) DOCUMENTED: ICD-10-PCS | Mod: CPTII,S$GLB,, | Performed by: INTERNAL MEDICINE

## 2019-12-24 PROCEDURE — 99999 PR PBB SHADOW E&M-EST. PATIENT-LVL IV: CPT | Mod: PBBFAC,,, | Performed by: INTERNAL MEDICINE

## 2019-12-24 PROCEDURE — 99999 PR PBB SHADOW E&M-EST. PATIENT-LVL IV: ICD-10-PCS | Mod: PBBFAC,,, | Performed by: INTERNAL MEDICINE

## 2019-12-24 PROCEDURE — 99214 PR OFFICE/OUTPT VISIT, EST, LEVL IV, 30-39 MIN: ICD-10-PCS | Mod: S$GLB,,, | Performed by: INTERNAL MEDICINE

## 2019-12-24 PROCEDURE — 3077F PR MOST RECENT SYSTOLIC BLOOD PRESSURE >= 140 MM HG: ICD-10-PCS | Mod: CPTII,S$GLB,, | Performed by: INTERNAL MEDICINE

## 2019-12-24 PROCEDURE — 3079F PR MOST RECENT DIASTOLIC BLOOD PRESSURE 80-89 MM HG: ICD-10-PCS | Mod: CPTII,S$GLB,, | Performed by: INTERNAL MEDICINE

## 2019-12-24 PROCEDURE — 99214 OFFICE O/P EST MOD 30 MIN: CPT | Mod: S$GLB,,, | Performed by: INTERNAL MEDICINE

## 2019-12-24 PROCEDURE — 71260 CT CHEST ABDOMEN PELVIS WITH CONTRAST (XPD): ICD-10-PCS | Mod: 26,,, | Performed by: RADIOLOGY

## 2019-12-24 RX ADMIN — IOHEXOL 100 ML: 350 INJECTION, SOLUTION INTRAVENOUS at 10:12

## 2019-12-31 DIAGNOSIS — Z79.899 IMMUNOSUPPRESSION DUE TO DRUG THERAPY: ICD-10-CM

## 2019-12-31 DIAGNOSIS — M35.9 POLYMYOSITIS ASSOCIATED WITH AUTOIMMUNE DISEASE: ICD-10-CM

## 2019-12-31 DIAGNOSIS — D84.821 IMMUNOSUPPRESSION DUE TO DRUG THERAPY: ICD-10-CM

## 2019-12-31 DIAGNOSIS — D64.9 ANEMIA, UNSPECIFIED TYPE: ICD-10-CM

## 2019-12-31 DIAGNOSIS — T45.1X5A CHEMOTHERAPY-INDUCED NEUROPATHY: ICD-10-CM

## 2019-12-31 DIAGNOSIS — M33.13 DERMATOMYOSITIS: ICD-10-CM

## 2019-12-31 DIAGNOSIS — Z79.899 ENCOUNTER FOR LONG-TERM CURRENT USE OF HIGH RISK MEDICATION: ICD-10-CM

## 2019-12-31 DIAGNOSIS — C50.412 MALIGNANT NEOPLASM OF UPPER-OUTER QUADRANT OF LEFT BREAST IN FEMALE, ESTROGEN RECEPTOR NEGATIVE: ICD-10-CM

## 2019-12-31 DIAGNOSIS — M33.20 POLYMYOSITIS ASSOCIATED WITH AUTOIMMUNE DISEASE: ICD-10-CM

## 2019-12-31 DIAGNOSIS — Z17.1 MALIGNANT NEOPLASM OF UPPER-OUTER QUADRANT OF LEFT BREAST IN FEMALE, ESTROGEN RECEPTOR NEGATIVE: ICD-10-CM

## 2019-12-31 DIAGNOSIS — G62.0 CHEMOTHERAPY-INDUCED NEUROPATHY: ICD-10-CM

## 2019-12-31 DIAGNOSIS — M60.9 MYOSITIS, UNSPECIFIED MYOSITIS TYPE, UNSPECIFIED SITE: ICD-10-CM

## 2019-12-31 RX ORDER — PREDNISONE 5 MG/1
5 TABLET ORAL DAILY
Qty: 30 TABLET | Refills: 2 | Status: SHIPPED | OUTPATIENT
Start: 2019-12-31 | End: 2020-01-30

## 2020-01-02 LAB
COMMENT: NORMAL
FINAL PATHOLOGIC DIAGNOSIS: NORMAL
GROSS: NORMAL
MICROSCOPIC EXAM: NORMAL

## 2020-01-03 ENCOUNTER — CLINICAL SUPPORT (OUTPATIENT)
Dept: DERMATOLOGY | Facility: CLINIC | Age: 60
End: 2020-01-03
Payer: COMMERCIAL

## 2020-01-03 DIAGNOSIS — Z48.02 VISIT FOR SUTURE REMOVAL: Primary | ICD-10-CM

## 2020-01-03 PROCEDURE — 99999 PR PBB SHADOW E&M-EST. PATIENT-LVL III: CPT | Mod: PBBFAC,,,

## 2020-01-03 PROCEDURE — 99499 NO LOS: ICD-10-PCS | Mod: S$GLB,,, | Performed by: DERMATOLOGY

## 2020-01-03 PROCEDURE — 99999 PR PBB SHADOW E&M-EST. PATIENT-LVL III: ICD-10-PCS | Mod: PBBFAC,,,

## 2020-01-03 PROCEDURE — 99499 UNLISTED E&M SERVICE: CPT | Mod: S$GLB,,, | Performed by: DERMATOLOGY

## 2020-01-05 ENCOUNTER — PATIENT MESSAGE (OUTPATIENT)
Dept: RHEUMATOLOGY | Facility: CLINIC | Age: 60
End: 2020-01-05

## 2020-01-06 ENCOUNTER — INFUSION (OUTPATIENT)
Dept: INFUSION THERAPY | Facility: HOSPITAL | Age: 60
End: 2020-01-06
Attending: INTERNAL MEDICINE
Payer: COMMERCIAL

## 2020-01-06 VITALS
HEART RATE: 94 BPM | SYSTOLIC BLOOD PRESSURE: 127 MMHG | BODY MASS INDEX: 31.7 KG/M2 | RESPIRATION RATE: 16 BRPM | OXYGEN SATURATION: 99 % | WEIGHT: 190.25 LBS | TEMPERATURE: 99 F | DIASTOLIC BLOOD PRESSURE: 66 MMHG | HEIGHT: 65 IN

## 2020-01-06 DIAGNOSIS — M33.13 DERMATOMYOSITIS: Primary | ICD-10-CM

## 2020-01-06 DIAGNOSIS — R13.19 ESOPHAGEAL DYSPHAGIA: ICD-10-CM

## 2020-01-06 PROCEDURE — 63600175 PHARM REV CODE 636 W HCPCS: Mod: JG | Performed by: STUDENT IN AN ORGANIZED HEALTH CARE EDUCATION/TRAINING PROGRAM

## 2020-01-06 PROCEDURE — 96366 THER/PROPH/DIAG IV INF ADDON: CPT

## 2020-01-06 PROCEDURE — 25000003 PHARM REV CODE 250: Performed by: INTERNAL MEDICINE

## 2020-01-06 PROCEDURE — S0028 INJECTION, FAMOTIDINE, 20 MG: HCPCS | Performed by: INTERNAL MEDICINE

## 2020-01-06 PROCEDURE — 96367 TX/PROPH/DG ADDL SEQ IV INF: CPT

## 2020-01-06 PROCEDURE — 63600175 PHARM REV CODE 636 W HCPCS: Performed by: INTERNAL MEDICINE

## 2020-01-06 PROCEDURE — 96375 TX/PRO/DX INJ NEW DRUG ADDON: CPT

## 2020-01-06 PROCEDURE — 96365 THER/PROPH/DIAG IV INF INIT: CPT

## 2020-01-06 RX ORDER — ACETAMINOPHEN 325 MG/1
650 TABLET ORAL
Status: CANCELLED | OUTPATIENT
Start: 2020-01-20

## 2020-01-06 RX ORDER — FAMOTIDINE 10 MG/ML
20 INJECTION INTRAVENOUS
Status: COMPLETED | OUTPATIENT
Start: 2020-01-06 | End: 2020-01-06

## 2020-01-06 RX ORDER — HEPARIN 100 UNIT/ML
500 SYRINGE INTRAVENOUS
Status: CANCELLED | OUTPATIENT
Start: 2020-01-20

## 2020-01-06 RX ORDER — SODIUM CHLORIDE 0.9 % (FLUSH) 0.9 %
10 SYRINGE (ML) INJECTION
Status: DISCONTINUED | OUTPATIENT
Start: 2020-01-06 | End: 2020-01-06 | Stop reason: HOSPADM

## 2020-01-06 RX ORDER — SODIUM CHLORIDE 0.9 % (FLUSH) 0.9 %
10 SYRINGE (ML) INJECTION
Status: CANCELLED | OUTPATIENT
Start: 2020-01-20

## 2020-01-06 RX ORDER — FAMOTIDINE 10 MG/ML
20 INJECTION INTRAVENOUS
Status: CANCELLED | OUTPATIENT
Start: 2020-01-20

## 2020-01-06 RX ORDER — ACETAMINOPHEN 325 MG/1
650 TABLET ORAL
Status: COMPLETED | OUTPATIENT
Start: 2020-01-06 | End: 2020-01-06

## 2020-01-06 RX ORDER — HEPARIN 100 UNIT/ML
500 SYRINGE INTRAVENOUS
Status: DISCONTINUED | OUTPATIENT
Start: 2020-01-06 | End: 2020-01-06 | Stop reason: HOSPADM

## 2020-01-06 RX ADMIN — SODIUM CHLORIDE: 0.9 INJECTION, SOLUTION INTRAVENOUS at 09:01

## 2020-01-06 RX ADMIN — ACETAMINOPHEN 650 MG: 325 TABLET ORAL at 09:01

## 2020-01-06 RX ADMIN — FAMOTIDINE 20 MG: 10 INJECTION, SOLUTION INTRAVENOUS at 09:01

## 2020-01-06 RX ADMIN — HUMAN IMMUNOGLOBULIN G 85 G: 40 LIQUID INTRAVENOUS at 10:01

## 2020-01-06 RX ADMIN — DIPHENHYDRAMINE HYDROCHLORIDE 50 MG: 50 INJECTION, SOLUTION INTRAMUSCULAR; INTRAVENOUS at 09:01

## 2020-01-06 RX ADMIN — HEPARIN 500 UNITS: 100 SYRINGE at 02:01

## 2020-01-06 NOTE — PLAN OF CARE
Pt received IVIG today and tolerated well, without complications. Educated patient about IVIG (indications, side effects, possible reactions, c) and verbalized understanding.  VSS. CW port positive for blood return, saline flushed, Heparin flush instilled to dwell and de accessed prior to DC. Pt DC with no distress noted, ambulated off of unit escorted via family

## 2020-01-08 ENCOUNTER — TELEPHONE (OUTPATIENT)
Dept: PHARMACY | Facility: CLINIC | Age: 60
End: 2020-01-08

## 2020-01-09 NOTE — TELEPHONE ENCOUNTER
Attempted to follow-up with patient's reports of swelling, no answer, left voicemail for call back.

## 2020-01-14 DIAGNOSIS — D50.9 IRON DEFICIENCY ANEMIA, UNSPECIFIED IRON DEFICIENCY ANEMIA TYPE: ICD-10-CM

## 2020-01-14 RX ORDER — FERROUS SULFATE 325(65) MG
TABLET ORAL
Qty: 60 TABLET | Refills: 4 | Status: SHIPPED | OUTPATIENT
Start: 2020-01-14 | End: 2020-02-14

## 2020-01-21 ENCOUNTER — INFUSION (OUTPATIENT)
Dept: INFUSION THERAPY | Facility: HOSPITAL | Age: 60
End: 2020-01-21
Attending: INTERNAL MEDICINE
Payer: COMMERCIAL

## 2020-01-21 VITALS
WEIGHT: 190.25 LBS | TEMPERATURE: 98 F | DIASTOLIC BLOOD PRESSURE: 73 MMHG | RESPIRATION RATE: 18 BRPM | BODY MASS INDEX: 31.7 KG/M2 | SYSTOLIC BLOOD PRESSURE: 133 MMHG | HEART RATE: 94 BPM | HEIGHT: 65 IN

## 2020-01-21 DIAGNOSIS — M33.13 DERMATOMYOSITIS: Primary | ICD-10-CM

## 2020-01-21 DIAGNOSIS — R13.19 ESOPHAGEAL DYSPHAGIA: ICD-10-CM

## 2020-01-21 PROCEDURE — 63600175 PHARM REV CODE 636 W HCPCS: Performed by: INTERNAL MEDICINE

## 2020-01-21 PROCEDURE — 96367 TX/PROPH/DG ADDL SEQ IV INF: CPT

## 2020-01-21 PROCEDURE — 63600175 PHARM REV CODE 636 W HCPCS: Mod: JG | Performed by: STUDENT IN AN ORGANIZED HEALTH CARE EDUCATION/TRAINING PROGRAM

## 2020-01-21 PROCEDURE — 25000003 PHARM REV CODE 250: Performed by: INTERNAL MEDICINE

## 2020-01-21 PROCEDURE — A4216 STERILE WATER/SALINE, 10 ML: HCPCS | Performed by: INTERNAL MEDICINE

## 2020-01-21 PROCEDURE — S0028 INJECTION, FAMOTIDINE, 20 MG: HCPCS | Performed by: INTERNAL MEDICINE

## 2020-01-21 PROCEDURE — 96365 THER/PROPH/DIAG IV INF INIT: CPT

## 2020-01-21 PROCEDURE — 96375 TX/PRO/DX INJ NEW DRUG ADDON: CPT

## 2020-01-21 PROCEDURE — 96366 THER/PROPH/DIAG IV INF ADDON: CPT

## 2020-01-21 RX ORDER — FAMOTIDINE 10 MG/ML
20 INJECTION INTRAVENOUS
Status: CANCELLED | OUTPATIENT
Start: 2020-02-03

## 2020-01-21 RX ORDER — SODIUM CHLORIDE 0.9 % (FLUSH) 0.9 %
10 SYRINGE (ML) INJECTION
Status: CANCELLED | OUTPATIENT
Start: 2020-02-03

## 2020-01-21 RX ORDER — HEPARIN 100 UNIT/ML
500 SYRINGE INTRAVENOUS
Status: CANCELLED | OUTPATIENT
Start: 2020-02-03

## 2020-01-21 RX ORDER — SODIUM CHLORIDE 0.9 % (FLUSH) 0.9 %
10 SYRINGE (ML) INJECTION
Status: DISCONTINUED | OUTPATIENT
Start: 2020-01-21 | End: 2020-01-21 | Stop reason: HOSPADM

## 2020-01-21 RX ORDER — HEPARIN 100 UNIT/ML
500 SYRINGE INTRAVENOUS
Status: DISCONTINUED | OUTPATIENT
Start: 2020-01-21 | End: 2020-01-21 | Stop reason: HOSPADM

## 2020-01-21 RX ORDER — ACETAMINOPHEN 325 MG/1
650 TABLET ORAL
Status: CANCELLED | OUTPATIENT
Start: 2020-02-03

## 2020-01-21 RX ORDER — FAMOTIDINE 10 MG/ML
20 INJECTION INTRAVENOUS
Status: COMPLETED | OUTPATIENT
Start: 2020-01-21 | End: 2020-01-21

## 2020-01-21 RX ORDER — ACETAMINOPHEN 325 MG/1
650 TABLET ORAL
Status: COMPLETED | OUTPATIENT
Start: 2020-01-21 | End: 2020-01-21

## 2020-01-21 RX ADMIN — DIPHENHYDRAMINE HYDROCHLORIDE 50 MG: 50 INJECTION INTRAMUSCULAR; INTRAVENOUS at 09:01

## 2020-01-21 RX ADMIN — HUMAN IMMUNOGLOBULIN G 85 G: 40 LIQUID INTRAVENOUS at 09:01

## 2020-01-21 RX ADMIN — SODIUM CHLORIDE: 9 INJECTION, SOLUTION INTRAVENOUS at 08:01

## 2020-01-21 RX ADMIN — FAMOTIDINE 20 MG: 10 INJECTION, SOLUTION INTRAVENOUS at 09:01

## 2020-01-21 RX ADMIN — Medication 10 ML: at 02:01

## 2020-01-21 RX ADMIN — HEPARIN 500 UNITS: 100 SYRINGE at 02:01

## 2020-01-21 RX ADMIN — ACETAMINOPHEN 650 MG: 325 TABLET ORAL at 09:01

## 2020-01-21 NOTE — PLAN OF CARE
Problem: Adult Inpatient Plan of Care  Goal: Optimal Comfort and Wellbeing  Intervention: Provide Person-Centered Care  Flowsheets (Taken 1/21/2020 0910)  Trust Relationship/Rapport: care explained; reassurance provided; choices provided; thoughts/feelings acknowledged; emotional support provided; empathic listening provided; questions encouraged; questions answered

## 2020-01-21 NOTE — PLAN OF CARE
Pt tolerated IVIG today. NAD. Port flushed + blood return present, flushed. Hep lock and deaccesed.AVS given to pt. Discharged home. Ambulated independently.

## 2020-01-22 ENCOUNTER — TELEPHONE (OUTPATIENT)
Dept: INTERNAL MEDICINE | Facility: CLINIC | Age: 60
End: 2020-01-22

## 2020-01-22 NOTE — TELEPHONE ENCOUNTER
Called and left a vm for the pt to see what date she wanted to keep asked for a call back to confirm.

## 2020-01-22 NOTE — TELEPHONE ENCOUNTER
She has appt to see me Mon and also in march. Please see which one is correct and cancel incorrect.

## 2020-01-27 ENCOUNTER — OFFICE VISIT (OUTPATIENT)
Dept: INTERNAL MEDICINE | Facility: CLINIC | Age: 60
End: 2020-01-27
Payer: COMMERCIAL

## 2020-01-27 ENCOUNTER — CLINICAL SUPPORT (OUTPATIENT)
Dept: INTERNAL MEDICINE | Facility: CLINIC | Age: 60
End: 2020-01-27
Payer: COMMERCIAL

## 2020-01-27 VITALS
TEMPERATURE: 98 F | WEIGHT: 192.88 LBS | HEART RATE: 93 BPM | SYSTOLIC BLOOD PRESSURE: 116 MMHG | BODY MASS INDEX: 32.14 KG/M2 | DIASTOLIC BLOOD PRESSURE: 64 MMHG | HEIGHT: 65 IN

## 2020-01-27 DIAGNOSIS — M33.13 DERMATOMYOSITIS: ICD-10-CM

## 2020-01-27 DIAGNOSIS — D64.9 NORMOCYTIC ANEMIA: ICD-10-CM

## 2020-01-27 DIAGNOSIS — Z17.1 MALIGNANT NEOPLASM OF UPPER-OUTER QUADRANT OF LEFT BREAST IN FEMALE, ESTROGEN RECEPTOR NEGATIVE: Chronic | ICD-10-CM

## 2020-01-27 DIAGNOSIS — T45.1X5A CHEMOTHERAPY-INDUCED NEUROPATHY: ICD-10-CM

## 2020-01-27 DIAGNOSIS — G62.0 CHEMOTHERAPY-INDUCED NEUROPATHY: ICD-10-CM

## 2020-01-27 DIAGNOSIS — D84.821 IMMUNOSUPPRESSION DUE TO DRUG THERAPY: ICD-10-CM

## 2020-01-27 DIAGNOSIS — J31.0 CHRONIC RHINITIS: ICD-10-CM

## 2020-01-27 DIAGNOSIS — C50.412 MALIGNANT NEOPLASM OF UPPER-OUTER QUADRANT OF LEFT BREAST IN FEMALE, ESTROGEN RECEPTOR NEGATIVE: Chronic | ICD-10-CM

## 2020-01-27 DIAGNOSIS — R00.2 PALPITATION: ICD-10-CM

## 2020-01-27 DIAGNOSIS — I10 BENIGN ESSENTIAL HYPERTENSION: Primary | ICD-10-CM

## 2020-01-27 DIAGNOSIS — Z79.52 CURRENT CHRONIC USE OF SYSTEMIC STEROIDS: ICD-10-CM

## 2020-01-27 DIAGNOSIS — Z79.899 IMMUNOSUPPRESSION DUE TO DRUG THERAPY: ICD-10-CM

## 2020-01-27 DIAGNOSIS — R73.03 PREDIABETES: ICD-10-CM

## 2020-01-27 DIAGNOSIS — Z74.09 IMPAIRED FUNCTIONAL MOBILITY AND ENDURANCE: ICD-10-CM

## 2020-01-27 PROBLEM — E44.0 MODERATE MALNUTRITION: Status: RESOLVED | Noted: 2019-02-05 | Resolved: 2020-01-27

## 2020-01-27 PROBLEM — F43.21 ADJUSTMENT DISORDER WITH DEPRESSED MOOD: Status: RESOLVED | Noted: 2018-03-11 | Resolved: 2020-01-27

## 2020-01-27 PROBLEM — C77.9 REGIONAL LYMPH NODE METASTASIS PRESENT: Status: RESOLVED | Noted: 2018-10-26 | Resolved: 2020-01-27

## 2020-01-27 PROCEDURE — 99999 PR PBB SHADOW E&M-EST. PATIENT-LVL IV: CPT | Mod: PBBFAC,,, | Performed by: INTERNAL MEDICINE

## 2020-01-27 PROCEDURE — 90732 PNEUMOCOCCAL POLYSACCHARIDE VACCINE 23-VALENT =>2YO SQ IM: ICD-10-PCS | Mod: S$GLB,,, | Performed by: INTERNAL MEDICINE

## 2020-01-27 PROCEDURE — 99999 PR PBB SHADOW E&M-EST. PATIENT-LVL I: CPT | Mod: PBBFAC,,,

## 2020-01-27 PROCEDURE — 99999 PR PBB SHADOW E&M-EST. PATIENT-LVL IV: ICD-10-PCS | Mod: PBBFAC,,, | Performed by: INTERNAL MEDICINE

## 2020-01-27 PROCEDURE — 3074F PR MOST RECENT SYSTOLIC BLOOD PRESSURE < 130 MM HG: ICD-10-PCS | Mod: CPTII,S$GLB,, | Performed by: INTERNAL MEDICINE

## 2020-01-27 PROCEDURE — 3078F PR MOST RECENT DIASTOLIC BLOOD PRESSURE < 80 MM HG: ICD-10-PCS | Mod: CPTII,S$GLB,, | Performed by: INTERNAL MEDICINE

## 2020-01-27 PROCEDURE — 3074F SYST BP LT 130 MM HG: CPT | Mod: CPTII,S$GLB,, | Performed by: INTERNAL MEDICINE

## 2020-01-27 PROCEDURE — 99999 PR PBB SHADOW E&M-EST. PATIENT-LVL I: ICD-10-PCS | Mod: PBBFAC,,,

## 2020-01-27 PROCEDURE — 90732 PPSV23 VACC 2 YRS+ SUBQ/IM: CPT | Mod: S$GLB,,, | Performed by: INTERNAL MEDICINE

## 2020-01-27 PROCEDURE — 3078F DIAST BP <80 MM HG: CPT | Mod: CPTII,S$GLB,, | Performed by: INTERNAL MEDICINE

## 2020-01-27 PROCEDURE — 3008F PR BODY MASS INDEX (BMI) DOCUMENTED: ICD-10-PCS | Mod: CPTII,S$GLB,, | Performed by: INTERNAL MEDICINE

## 2020-01-27 PROCEDURE — 99215 PR OFFICE/OUTPT VISIT, EST, LEVL V, 40-54 MIN: ICD-10-PCS | Mod: 25,S$GLB,, | Performed by: INTERNAL MEDICINE

## 2020-01-27 PROCEDURE — 3008F BODY MASS INDEX DOCD: CPT | Mod: CPTII,S$GLB,, | Performed by: INTERNAL MEDICINE

## 2020-01-27 PROCEDURE — 90471 PNEUMOCOCCAL POLYSACCHARIDE VACCINE 23-VALENT =>2YO SQ IM: ICD-10-PCS | Mod: S$GLB,,, | Performed by: INTERNAL MEDICINE

## 2020-01-27 PROCEDURE — 99215 OFFICE O/P EST HI 40 MIN: CPT | Mod: 25,S$GLB,, | Performed by: INTERNAL MEDICINE

## 2020-01-27 PROCEDURE — 90471 IMMUNIZATION ADMIN: CPT | Mod: S$GLB,,, | Performed by: INTERNAL MEDICINE

## 2020-01-27 RX ORDER — AMLODIPINE BESYLATE 10 MG/1
10 TABLET ORAL DAILY
Qty: 90 TABLET | Refills: 3 | Status: SHIPPED | OUTPATIENT
Start: 2020-01-27 | End: 2021-01-08

## 2020-01-27 RX ORDER — AZELASTINE 1 MG/ML
1 SPRAY, METERED NASAL 2 TIMES DAILY
Qty: 30 ML | Refills: 2 | Status: SHIPPED | OUTPATIENT
Start: 2020-01-27 | End: 2021-06-07 | Stop reason: SDUPTHER

## 2020-01-27 NOTE — PROGRESS NOTES
Subjective:       Patient ID: Ermelinda Verde is a 59 y.o. female.    Chief Complaint: Follow-up    HPI   Pt is known to me.   Last seen her 6/18/19.   Recurrent L breast CA s/p L mastectomy 2017, s/p chemoXRT.   Chemo induced toes and fingers neuropathy.   Follows w/ Dr. Reyna, last seen 12/24/19.   CT C/A/P 12/24/19 - neg for recurrent disease.   R MMG 10/15/19 neg.    Hospitalized 1/2019 for new dx of dermatomyositis (confirmed on skin biopsy).  During hospitalization, failed swallow study-->PEG  EGD/Cscope 7/2019 normal and so s/p PEG removal  Follows w/ Dr. Swift and Dr. FUNEZ in rheum.  Cellcept 500mg BID, prednisone 15mg daily, monthly IVIG. Previous MTX-->pancytopenia  Last saw Dr. Kan 12/22/19. Skin biopsy consistent w/ dermatomyositis.  Reports has most recent puffiness in the hand and face. Has been on prednisone for the last yr.   Reviewed her flowsheet and since PEG removal in July, has gradual weight gain.   PDM - last a1c 1/22/19.    HTN - amlodipine 10mg daily.     Review of Systems   Constitutional: Negative for activity change and unexpected weight change.   HENT: Positive for postnasal drip. Negative for congestion, hearing loss, rhinorrhea and trouble swallowing.    Eyes: Negative for discharge and visual disturbance.   Respiratory: Negative for cough, chest tightness, shortness of breath and wheezing.    Cardiovascular: Negative for chest pain and palpitations.   Gastrointestinal: Negative for blood in stool, constipation, diarrhea and vomiting.   Endocrine: Negative for polydipsia and polyuria.   Genitourinary: Negative for difficulty urinating, dysuria, hematuria and menstrual problem.   Musculoskeletal: Positive for joint swelling. Negative for arthralgias and neck pain.   Neurological: Positive for weakness (diffuse. hasn't been exercising. no joint/muscle pains currently). Negative for headaches.   Psychiatric/Behavioral: Negative for confusion and dysphoric mood.       Objective:     "  Physical Exam    /64 (BP Location: Right arm, Patient Position: Sitting, BP Method: Large (Manual))   Pulse 93   Temp 98.2 °F (36.8 °C)   Ht 5' 5" (1.651 m)   Wt 87.5 kg (192 lb 14.4 oz)   LMP  (LMP Unknown)   BMI 32.10 kg/m²     GEN - A+OX4, NAD   HEENT - PERRL, EOMI, OP clear. MMM. TM normal.   Neck - No thyromegaly or cervical LAD. No thyroid masses felt.  CV - RRR, I/VI GLORIA best at LUSB.   Chest - CTAB, no wheezing or rhonchi  Abd - S/NT/ND/+BS.   Ext - 2+BDP and radial pulses. No LE edema.  Neuro - 5/5 BUE and BLE strength. Normal gait.  Skin - erythematous area around eyes, chest, hands. Taut skin.    Labs reviewed.    Assessment/Plan     Ermelinda was seen today for follow-up.    Diagnoses and all orders for this visit:    Benign essential hypertension - Stable and controlled. Continue current medications.  -     amLODIPine (NORVASC) 10 MG tablet; Take 1 tablet (10 mg total) by mouth once daily.    Dermatomyositis - cont current meds. F/u w/ rheum.  -     Ambulatory consult to Physical Therapy    Current chronic use of systemic steroids - IGT on labs.  -     Hemoglobin A1c; Future  -     Lipid panel; Future  -     DXA Bone Density Spine And Hip; Future    Prediabetes  -     Hemoglobin A1c; Future    Normocytic anemia - likely aocd. On iron BID and folic acid daily.   -     Ferritin; Future  -     Folate; Future  -     Iron and TIBC; Future  -     Vitamin B12; Future    Immunosuppression due to drug therapy - no signs of active infection.     Malignant neoplasm of upper-outer quadrant of left breast in female, estrogen receptor negative - f/u w/ H/O.     Palpitation  -     Echo Color Flow Doppler? Yes    Chronic rhinitis  -     azelastine (ASTELIN) 137 mcg (0.1 %) nasal spray; 1 spray (137 mcg total) by Nasal route 2 (two) times daily.    Chemotherapy-induced neuropathy - discussed foot hygiene. Not bothersome enough to be on medicines.  -     Ambulatory consult to Physical Therapy    Impaired " functional mobility and endurance  -     Ambulatory consult to Physical Therapy    45 minutes was spent on patient with over half the time was spent in coordination of care and/or counseling.  Pneumovax today.  Follow up in about 4 months (around 5/27/2020).      Reta Weeks MD  Department of Internal Medicine - [facility]  10:24 AM

## 2020-02-03 ENCOUNTER — INFUSION (OUTPATIENT)
Dept: INFUSION THERAPY | Facility: HOSPITAL | Age: 60
End: 2020-02-03
Attending: INTERNAL MEDICINE
Payer: COMMERCIAL

## 2020-02-03 VITALS
TEMPERATURE: 98 F | DIASTOLIC BLOOD PRESSURE: 66 MMHG | RESPIRATION RATE: 18 BRPM | SYSTOLIC BLOOD PRESSURE: 131 MMHG | HEART RATE: 95 BPM

## 2020-02-03 DIAGNOSIS — R13.19 ESOPHAGEAL DYSPHAGIA: ICD-10-CM

## 2020-02-03 DIAGNOSIS — M33.13 DERMATOMYOSITIS: Primary | ICD-10-CM

## 2020-02-03 PROCEDURE — 63600175 PHARM REV CODE 636 W HCPCS: Performed by: INTERNAL MEDICINE

## 2020-02-03 PROCEDURE — 96367 TX/PROPH/DG ADDL SEQ IV INF: CPT

## 2020-02-03 PROCEDURE — 25000003 PHARM REV CODE 250: Performed by: INTERNAL MEDICINE

## 2020-02-03 PROCEDURE — 96375 TX/PRO/DX INJ NEW DRUG ADDON: CPT

## 2020-02-03 PROCEDURE — 96366 THER/PROPH/DIAG IV INF ADDON: CPT

## 2020-02-03 PROCEDURE — 63600175 PHARM REV CODE 636 W HCPCS: Mod: JG | Performed by: STUDENT IN AN ORGANIZED HEALTH CARE EDUCATION/TRAINING PROGRAM

## 2020-02-03 PROCEDURE — 96365 THER/PROPH/DIAG IV INF INIT: CPT

## 2020-02-03 PROCEDURE — S0028 INJECTION, FAMOTIDINE, 20 MG: HCPCS | Performed by: INTERNAL MEDICINE

## 2020-02-03 RX ORDER — SODIUM CHLORIDE 0.9 % (FLUSH) 0.9 %
10 SYRINGE (ML) INJECTION
Status: DISCONTINUED | OUTPATIENT
Start: 2020-02-03 | End: 2020-02-03 | Stop reason: HOSPADM

## 2020-02-03 RX ORDER — FAMOTIDINE 10 MG/ML
20 INJECTION INTRAVENOUS
Status: CANCELLED | OUTPATIENT
Start: 2020-02-17

## 2020-02-03 RX ORDER — HEPARIN 100 UNIT/ML
500 SYRINGE INTRAVENOUS
Status: DISCONTINUED | OUTPATIENT
Start: 2020-02-03 | End: 2020-02-03 | Stop reason: HOSPADM

## 2020-02-03 RX ORDER — FAMOTIDINE 10 MG/ML
20 INJECTION INTRAVENOUS
Status: COMPLETED | OUTPATIENT
Start: 2020-02-03 | End: 2020-02-03

## 2020-02-03 RX ORDER — SODIUM CHLORIDE 0.9 % (FLUSH) 0.9 %
10 SYRINGE (ML) INJECTION
Status: CANCELLED | OUTPATIENT
Start: 2020-02-17

## 2020-02-03 RX ORDER — HEPARIN 100 UNIT/ML
500 SYRINGE INTRAVENOUS
Status: CANCELLED | OUTPATIENT
Start: 2020-02-17

## 2020-02-03 RX ORDER — ACETAMINOPHEN 325 MG/1
650 TABLET ORAL
Status: CANCELLED | OUTPATIENT
Start: 2020-02-17

## 2020-02-03 RX ORDER — ACETAMINOPHEN 325 MG/1
650 TABLET ORAL
Status: COMPLETED | OUTPATIENT
Start: 2020-02-03 | End: 2020-02-03

## 2020-02-03 RX ADMIN — HEPARIN 500 UNITS: 100 SYRINGE at 12:02

## 2020-02-03 RX ADMIN — FAMOTIDINE 20 MG: 10 INJECTION, SOLUTION INTRAVENOUS at 08:02

## 2020-02-03 RX ADMIN — HUMAN IMMUNOGLOBULIN G 85 G: 40 LIQUID INTRAVENOUS at 08:02

## 2020-02-03 RX ADMIN — ACETAMINOPHEN 650 MG: 325 TABLET ORAL at 08:02

## 2020-02-03 RX ADMIN — SODIUM CHLORIDE: 9 INJECTION, SOLUTION INTRAVENOUS at 08:02

## 2020-02-03 RX ADMIN — DIPHENHYDRAMINE HYDROCHLORIDE 50 MG: 50 INJECTION, SOLUTION INTRAMUSCULAR; INTRAVENOUS at 08:02

## 2020-02-04 DIAGNOSIS — M33.13 DERMATOMYOSITIS: ICD-10-CM

## 2020-02-04 DIAGNOSIS — Z79.899 ENCOUNTER FOR LONG-TERM CURRENT USE OF HIGH RISK MEDICATION: ICD-10-CM

## 2020-02-04 DIAGNOSIS — D84.821 IMMUNOSUPPRESSION DUE TO DRUG THERAPY: ICD-10-CM

## 2020-02-04 DIAGNOSIS — M35.9 POLYMYOSITIS ASSOCIATED WITH AUTOIMMUNE DISEASE: ICD-10-CM

## 2020-02-04 DIAGNOSIS — M33.20 POLYMYOSITIS ASSOCIATED WITH AUTOIMMUNE DISEASE: ICD-10-CM

## 2020-02-04 DIAGNOSIS — Z17.1 MALIGNANT NEOPLASM OF UPPER-OUTER QUADRANT OF LEFT BREAST IN FEMALE, ESTROGEN RECEPTOR NEGATIVE: ICD-10-CM

## 2020-02-04 DIAGNOSIS — D64.9 ANEMIA, UNSPECIFIED TYPE: ICD-10-CM

## 2020-02-04 DIAGNOSIS — M60.9 MYOSITIS, UNSPECIFIED MYOSITIS TYPE, UNSPECIFIED SITE: ICD-10-CM

## 2020-02-04 DIAGNOSIS — C50.412 MALIGNANT NEOPLASM OF UPPER-OUTER QUADRANT OF LEFT BREAST IN FEMALE, ESTROGEN RECEPTOR NEGATIVE: ICD-10-CM

## 2020-02-04 DIAGNOSIS — Z79.899 IMMUNOSUPPRESSION DUE TO DRUG THERAPY: ICD-10-CM

## 2020-02-05 ENCOUNTER — PATIENT MESSAGE (OUTPATIENT)
Dept: RHEUMATOLOGY | Facility: CLINIC | Age: 60
End: 2020-02-05

## 2020-02-05 RX ORDER — MYCOPHENOLATE MOFETIL 500 MG/1
500 TABLET ORAL 2 TIMES DAILY
Qty: 30 TABLET | Refills: 3 | Status: SHIPPED | OUTPATIENT
Start: 2020-02-05 | End: 2020-02-19 | Stop reason: SDUPTHER

## 2020-02-06 ENCOUNTER — HOSPITAL ENCOUNTER (OUTPATIENT)
Dept: CARDIOLOGY | Facility: CLINIC | Age: 60
Discharge: HOME | End: 2020-02-06
Attending: INTERNAL MEDICINE
Payer: COMMERCIAL

## 2020-02-06 ENCOUNTER — HOSPITAL ENCOUNTER (OUTPATIENT)
Dept: RADIOLOGY | Facility: CLINIC | Age: 60
Discharge: HOME | End: 2020-02-06
Attending: INTERNAL MEDICINE
Payer: COMMERCIAL

## 2020-02-06 ENCOUNTER — TELEPHONE (OUTPATIENT)
Dept: PHARMACY | Facility: CLINIC | Age: 60
End: 2020-02-06

## 2020-02-06 VITALS
HEIGHT: 65 IN | SYSTOLIC BLOOD PRESSURE: 130 MMHG | HEART RATE: 96 BPM | WEIGHT: 193 LBS | DIASTOLIC BLOOD PRESSURE: 66 MMHG | BODY MASS INDEX: 32.15 KG/M2

## 2020-02-06 DIAGNOSIS — Z79.52 CURRENT CHRONIC USE OF SYSTEMIC STEROIDS: ICD-10-CM

## 2020-02-06 LAB
ASCENDING AORTA: 3.35 CM
AV INDEX (PROSTH): 0.81
AV MEAN GRADIENT: 7 MMHG
AV PEAK GRADIENT: 14 MMHG
AV VALVE AREA: 3.08 CM2
AV VELOCITY RATIO: 0.73
BSA FOR ECHO PROCEDURE: 2 M2
CV ECHO LV RWT: 0.39 CM
DOP CALC AO PEAK VEL: 1.9 M/S
DOP CALC AO VTI: 30.98 CM
DOP CALC LVOT AREA: 3.8 CM2
DOP CALC LVOT DIAMETER: 2.2 CM
DOP CALC LVOT PEAK VEL: 1.38 M/S
DOP CALC LVOT STROKE VOLUME: 95.33 CM3
DOP CALCLVOT PEAK VEL VTI: 25.09 CM
E WAVE DECELERATION TIME: 159.18 MSEC
E/A RATIO: 0.71
E/E' RATIO: 10.5 M/S
ECHO LV POSTERIOR WALL: 0.9 CM (ref 0.6–1.1)
FRACTIONAL SHORTENING: 31 % (ref 28–44)
INTERVENTRICULAR SEPTUM: 1 CM (ref 0.6–1.1)
LA MAJOR: 6.06 CM
LA MINOR: 6.28 CM
LA WIDTH: 3.79 CM
LEFT ATRIUM SIZE: 2.94 CM
LEFT ATRIUM VOLUME INDEX: 30 ML/M2
LEFT ATRIUM VOLUME: 58.42 CM3
LEFT INTERNAL DIMENSION IN SYSTOLE: 3.17 CM (ref 2.1–4)
LEFT VENTRICLE DIASTOLIC VOLUME INDEX: 49.87 ML/M2
LEFT VENTRICLE DIASTOLIC VOLUME: 97.17 ML
LEFT VENTRICLE MASS INDEX: 76 G/M2
LEFT VENTRICLE SYSTOLIC VOLUME INDEX: 20.5 ML/M2
LEFT VENTRICLE SYSTOLIC VOLUME: 39.95 ML
LEFT VENTRICULAR INTERNAL DIMENSION IN DIASTOLE: 4.6 CM (ref 3.5–6)
LEFT VENTRICULAR MASS: 148.1 G
LV LATERAL E/E' RATIO: 9.33 M/S
LV SEPTAL E/E' RATIO: 12 M/S
MV PEAK A VEL: 1.19 M/S
MV PEAK E VEL: 0.84 M/S
PISA TR MAX VEL: 2.33 M/S
RA MAJOR: 4.96 CM
RA PRESSURE: 3 MMHG
RA WIDTH: 3.18 CM
RIGHT VENTRICULAR END-DIASTOLIC DIMENSION: 3.51 CM
SINUS: 3.43 CM
STJ: 3.23 CM
TDI LATERAL: 0.09 M/S
TDI SEPTAL: 0.07 M/S
TDI: 0.08 M/S
TR MAX PG: 22 MMHG
TRICUSPID ANNULAR PLANE SYSTOLIC EXCURSION: 1.96 CM
TV REST PULMONARY ARTERY PRESSURE: 25 MMHG

## 2020-02-06 PROCEDURE — 93306 TTE W/DOPPLER COMPLETE: CPT | Mod: 26,,, | Performed by: INTERNAL MEDICINE

## 2020-02-06 PROCEDURE — 77080 DEXA BONE DENSITY SPINE HIP: ICD-10-PCS | Mod: 26,,, | Performed by: INTERNAL MEDICINE

## 2020-02-06 PROCEDURE — 77080 DXA BONE DENSITY AXIAL: CPT | Mod: 26,,, | Performed by: INTERNAL MEDICINE

## 2020-02-06 PROCEDURE — 93306 ECHO (CUPID ONLY): ICD-10-PCS | Mod: 26,,, | Performed by: INTERNAL MEDICINE

## 2020-02-06 PROCEDURE — 77080 DXA BONE DENSITY AXIAL: CPT | Mod: TC

## 2020-02-06 PROCEDURE — 93306 TTE W/DOPPLER COMPLETE: CPT

## 2020-02-10 ENCOUNTER — TELEPHONE (OUTPATIENT)
Dept: RHEUMATOLOGY | Facility: CLINIC | Age: 60
End: 2020-02-10

## 2020-02-12 ENCOUNTER — TELEPHONE (OUTPATIENT)
Dept: RHEUMATOLOGY | Facility: CLINIC | Age: 60
End: 2020-02-12

## 2020-02-12 NOTE — LETTER
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  Patient continues to have generalized weakness and fatigue that is limiting her ability to return back to work.  Since Nov 2019, patient continues to exhibit worsening of symptoms.  She had previously tried to return back to work (between Sept - Oct 2019) but unable to fulfill her work requirement due to her disease process.  Patient is unable to lift anything above 5lbs in all her bilateral upper extremities.  Patient is unable to perform activities such as long periods of standing/walking (>2hours/time).  If you have any questions, please feel free to contact me at [phone].    Thank you,             Dr. Clarissa Swift

## 2020-02-14 ENCOUNTER — TELEPHONE (OUTPATIENT)
Dept: INTERNAL MEDICINE | Facility: CLINIC | Age: 60
End: 2020-02-14

## 2020-02-14 NOTE — TELEPHONE ENCOUNTER
Please call and notify pt:    Her previous anemia is consistent with anemia of chronic disease. STOP iron at this time as her iron stores are very high. Her anemia has also resolved. White cell count is still low. Sugars are ok.   b12 level is high. Recommend cutting down to every other day. Folate level is also high. I recommend she check w/ the rheumatologist to see if she can decrease on dosage of folic acid.    Total cholesterol is high. I recommend low fat diet at this time and repeat at next visit. If still elevated, may eventually need to be started on cholesterol medicine.

## 2020-02-14 NOTE — TELEPHONE ENCOUNTER
Pt informed of all results and to take the B12 every other day and to check with the rheumatologist about decreasing her folic acid. Also informed pt to follow a low fat diet. Pt verbally understood.

## 2020-02-17 ENCOUNTER — INFUSION (OUTPATIENT)
Dept: INFUSION THERAPY | Facility: HOSPITAL | Age: 60
End: 2020-02-17
Attending: INTERNAL MEDICINE
Payer: COMMERCIAL

## 2020-02-17 VITALS
TEMPERATURE: 98 F | HEART RATE: 95 BPM | SYSTOLIC BLOOD PRESSURE: 139 MMHG | BODY MASS INDEX: 32.32 KG/M2 | DIASTOLIC BLOOD PRESSURE: 74 MMHG | HEIGHT: 65 IN | WEIGHT: 194 LBS | RESPIRATION RATE: 18 BRPM

## 2020-02-17 DIAGNOSIS — M33.13 DERMATOMYOSITIS: Primary | ICD-10-CM

## 2020-02-17 DIAGNOSIS — R13.19 ESOPHAGEAL DYSPHAGIA: ICD-10-CM

## 2020-02-17 PROCEDURE — 96365 THER/PROPH/DIAG IV INF INIT: CPT

## 2020-02-17 PROCEDURE — 25000003 PHARM REV CODE 250: Performed by: INTERNAL MEDICINE

## 2020-02-17 PROCEDURE — 96367 TX/PROPH/DG ADDL SEQ IV INF: CPT

## 2020-02-17 PROCEDURE — 96375 TX/PRO/DX INJ NEW DRUG ADDON: CPT

## 2020-02-17 PROCEDURE — S0028 INJECTION, FAMOTIDINE, 20 MG: HCPCS | Performed by: INTERNAL MEDICINE

## 2020-02-17 PROCEDURE — 63600175 PHARM REV CODE 636 W HCPCS: Mod: JG | Performed by: STUDENT IN AN ORGANIZED HEALTH CARE EDUCATION/TRAINING PROGRAM

## 2020-02-17 PROCEDURE — 96366 THER/PROPH/DIAG IV INF ADDON: CPT

## 2020-02-17 PROCEDURE — 63600175 PHARM REV CODE 636 W HCPCS: Performed by: INTERNAL MEDICINE

## 2020-02-17 PROCEDURE — A4216 STERILE WATER/SALINE, 10 ML: HCPCS | Performed by: INTERNAL MEDICINE

## 2020-02-17 RX ORDER — HEPARIN 100 UNIT/ML
500 SYRINGE INTRAVENOUS
Status: CANCELLED | OUTPATIENT
Start: 2020-03-02

## 2020-02-17 RX ORDER — SODIUM CHLORIDE 0.9 % (FLUSH) 0.9 %
10 SYRINGE (ML) INJECTION
Status: DISCONTINUED | OUTPATIENT
Start: 2020-02-17 | End: 2020-02-17 | Stop reason: HOSPADM

## 2020-02-17 RX ORDER — FAMOTIDINE 10 MG/ML
20 INJECTION INTRAVENOUS
Status: CANCELLED | OUTPATIENT
Start: 2020-03-02

## 2020-02-17 RX ORDER — HEPARIN 100 UNIT/ML
500 SYRINGE INTRAVENOUS
Status: DISCONTINUED | OUTPATIENT
Start: 2020-02-17 | End: 2020-02-17 | Stop reason: HOSPADM

## 2020-02-17 RX ORDER — SODIUM CHLORIDE 0.9 % (FLUSH) 0.9 %
10 SYRINGE (ML) INJECTION
Status: CANCELLED | OUTPATIENT
Start: 2020-03-02

## 2020-02-17 RX ORDER — FAMOTIDINE 10 MG/ML
20 INJECTION INTRAVENOUS
Status: COMPLETED | OUTPATIENT
Start: 2020-02-17 | End: 2020-02-17

## 2020-02-17 RX ORDER — ACETAMINOPHEN 325 MG/1
650 TABLET ORAL
Status: COMPLETED | OUTPATIENT
Start: 2020-02-17 | End: 2020-02-17

## 2020-02-17 RX ORDER — ACETAMINOPHEN 325 MG/1
650 TABLET ORAL
Status: CANCELLED | OUTPATIENT
Start: 2020-03-02

## 2020-02-17 RX ADMIN — ACETAMINOPHEN 650 MG: 325 TABLET ORAL at 08:02

## 2020-02-17 RX ADMIN — SODIUM CHLORIDE: 9 INJECTION, SOLUTION INTRAVENOUS at 08:02

## 2020-02-17 RX ADMIN — HEPARIN 500 UNITS: 100 SYRINGE at 12:02

## 2020-02-17 RX ADMIN — Medication 10 ML: at 12:02

## 2020-02-17 RX ADMIN — DIPHENHYDRAMINE HYDROCHLORIDE 50 MG: 50 INJECTION, SOLUTION INTRAMUSCULAR; INTRAVENOUS at 08:02

## 2020-02-17 RX ADMIN — HUMAN IMMUNOGLOBULIN G 90 G: 40 LIQUID INTRAVENOUS at 09:02

## 2020-02-17 RX ADMIN — FAMOTIDINE 20 MG: 10 INJECTION, SOLUTION INTRAVENOUS at 08:02

## 2020-02-19 ENCOUNTER — OFFICE VISIT (OUTPATIENT)
Dept: RHEUMATOLOGY | Facility: CLINIC | Age: 60
End: 2020-02-19
Payer: COMMERCIAL

## 2020-02-19 VITALS
SYSTOLIC BLOOD PRESSURE: 135 MMHG | BODY MASS INDEX: 32.95 KG/M2 | WEIGHT: 197.75 LBS | DIASTOLIC BLOOD PRESSURE: 79 MMHG | HEIGHT: 65 IN

## 2020-02-19 DIAGNOSIS — M60.9 MYOSITIS, UNSPECIFIED MYOSITIS TYPE, UNSPECIFIED SITE: ICD-10-CM

## 2020-02-19 DIAGNOSIS — M33.20 POLYMYOSITIS ASSOCIATED WITH AUTOIMMUNE DISEASE: ICD-10-CM

## 2020-02-19 DIAGNOSIS — C50.412 MALIGNANT NEOPLASM OF UPPER-OUTER QUADRANT OF LEFT BREAST IN FEMALE, ESTROGEN RECEPTOR NEGATIVE: ICD-10-CM

## 2020-02-19 DIAGNOSIS — Z79.899 ENCOUNTER FOR LONG-TERM CURRENT USE OF HIGH RISK MEDICATION: ICD-10-CM

## 2020-02-19 DIAGNOSIS — Z79.899 IMMUNOSUPPRESSION DUE TO DRUG THERAPY: ICD-10-CM

## 2020-02-19 DIAGNOSIS — M33.13 DERMATOMYOSITIS: ICD-10-CM

## 2020-02-19 DIAGNOSIS — M35.9 POLYMYOSITIS ASSOCIATED WITH AUTOIMMUNE DISEASE: ICD-10-CM

## 2020-02-19 DIAGNOSIS — D84.821 IMMUNOSUPPRESSION DUE TO DRUG THERAPY: ICD-10-CM

## 2020-02-19 DIAGNOSIS — D64.9 ANEMIA, UNSPECIFIED TYPE: ICD-10-CM

## 2020-02-19 DIAGNOSIS — Z17.1 MALIGNANT NEOPLASM OF UPPER-OUTER QUADRANT OF LEFT BREAST IN FEMALE, ESTROGEN RECEPTOR NEGATIVE: ICD-10-CM

## 2020-02-19 PROCEDURE — 3078F PR MOST RECENT DIASTOLIC BLOOD PRESSURE < 80 MM HG: ICD-10-PCS | Mod: CPTII,S$GLB,, | Performed by: STUDENT IN AN ORGANIZED HEALTH CARE EDUCATION/TRAINING PROGRAM

## 2020-02-19 PROCEDURE — 3075F SYST BP GE 130 - 139MM HG: CPT | Mod: CPTII,S$GLB,, | Performed by: STUDENT IN AN ORGANIZED HEALTH CARE EDUCATION/TRAINING PROGRAM

## 2020-02-19 PROCEDURE — 99999 PR PBB SHADOW E&M-EST. PATIENT-LVL IV: ICD-10-PCS | Mod: PBBFAC,,, | Performed by: STUDENT IN AN ORGANIZED HEALTH CARE EDUCATION/TRAINING PROGRAM

## 2020-02-19 PROCEDURE — 3008F PR BODY MASS INDEX (BMI) DOCUMENTED: ICD-10-PCS | Mod: CPTII,S$GLB,, | Performed by: STUDENT IN AN ORGANIZED HEALTH CARE EDUCATION/TRAINING PROGRAM

## 2020-02-19 PROCEDURE — 99214 OFFICE O/P EST MOD 30 MIN: CPT | Mod: S$GLB,,, | Performed by: STUDENT IN AN ORGANIZED HEALTH CARE EDUCATION/TRAINING PROGRAM

## 2020-02-19 PROCEDURE — 99214 PR OFFICE/OUTPT VISIT, EST, LEVL IV, 30-39 MIN: ICD-10-PCS | Mod: S$GLB,,, | Performed by: STUDENT IN AN ORGANIZED HEALTH CARE EDUCATION/TRAINING PROGRAM

## 2020-02-19 PROCEDURE — 3008F BODY MASS INDEX DOCD: CPT | Mod: CPTII,S$GLB,, | Performed by: STUDENT IN AN ORGANIZED HEALTH CARE EDUCATION/TRAINING PROGRAM

## 2020-02-19 PROCEDURE — 3075F PR MOST RECENT SYSTOLIC BLOOD PRESS GE 130-139MM HG: ICD-10-PCS | Mod: CPTII,S$GLB,, | Performed by: STUDENT IN AN ORGANIZED HEALTH CARE EDUCATION/TRAINING PROGRAM

## 2020-02-19 PROCEDURE — 3078F DIAST BP <80 MM HG: CPT | Mod: CPTII,S$GLB,, | Performed by: STUDENT IN AN ORGANIZED HEALTH CARE EDUCATION/TRAINING PROGRAM

## 2020-02-19 PROCEDURE — 99999 PR PBB SHADOW E&M-EST. PATIENT-LVL IV: CPT | Mod: PBBFAC,,, | Performed by: STUDENT IN AN ORGANIZED HEALTH CARE EDUCATION/TRAINING PROGRAM

## 2020-02-19 RX ORDER — MYCOPHENOLATE MOFETIL 500 MG/1
500 TABLET ORAL DAILY
Qty: 30 TABLET | Refills: 3 | Status: SHIPPED | OUTPATIENT
Start: 2020-02-19 | End: 2020-03-26

## 2020-02-19 RX ORDER — PREDNISONE 1 MG/1
TABLET ORAL
COMMUNITY
Start: 2020-02-15 | End: 2020-03-05 | Stop reason: SDUPTHER

## 2020-02-19 RX ORDER — PREDNISONE 10 MG/1
TABLET ORAL
COMMUNITY
Start: 2020-02-03 | End: 2020-03-25 | Stop reason: SDUPTHER

## 2020-02-19 ASSESSMENT — ROUTINE ASSESSMENT OF PATIENT INDEX DATA (RAPID3)
PATIENT GLOBAL ASSESSMENT SCORE: 1
PAIN SCORE: 2
AM STIFFNESS SCORE: 1, YES
TOTAL RAPID3 SCORE: 1.44
PSYCHOLOGICAL DISTRESS SCORE: 3.3
MDHAQ FUNCTION SCORE: .4
FATIGUE SCORE: 1

## 2020-02-19 NOTE — PROGRESS NOTES
Rapid3 Question Responses and Scores 2/15/2020   MDHAQ Score 0.4   Psychologic Score 3.3   Pain Score 2   When you awakened in the morning OVER THE LAST WEEK, did you feel stiff? Yes   If Yes, please indicate the number of hours until you are as limber as you will be for the day 1.5   Fatigue Score 1   Global Health Score 1   RAPID3 Score 1.44       Answers for HPI/ROS submitted by the patient on 2/15/2020   fever: No  eye redness: No  headaches: No  shortness of breath: No  chest pain: No  trouble swallowing: No  diarrhea: No  constipation: No  unexpected weight change: No  genital sore: No  dysuria: No  During the last 3 days, have you had a skin rash?: Yes  Bruises or bleeds easily: No  cough: No

## 2020-02-19 NOTE — PROGRESS NOTES
59 year old female with dermatomyositis s/p PD1 inhibitor used for breast cancer  TIF1 gamma +    Low dose steroids didn't help    Rash+ muscle weakness+dysphagia     IVIG and steroids initial treatment  mtx causes LFT derangement,anemia and leukopenia     On IVIG,now 1 g/kg bw ever 2 weeks  On cellcept 500 mg bid  On prednisone 9 mg    Imuran not an option since she is intermediate metaboliser     Leukopenia 2.00  Anemia improved  plts nml    AST went up  ALT nml  GFR nml    ESR,CRP nml  ESR big drop    Last ck,aldolase nml    Clinically she is better  No dysphagia  No muscle weakness on exam today  Rash is objectively better    Fluid overload    Drop prednisone 1 mg every 2 weeks  cellcept 500 mg daily taper down  Continue IVIG : Continue 1 mg/kg bw every 2 weeks     Follow labs in 4 weeks      Answers for HPI/ROS submitted by the patient on 2/15/2020   fever: No  eye redness: No  headaches: No  shortness of breath: No  chest pain: No  trouble swallowing: No  diarrhea: No  constipation: No  unexpected weight change: No  genital sore: No  dysuria: No  During the last 3 days, have you had a skin rash?: Yes  Bruises or bleeds easily: No  cough: No

## 2020-02-19 NOTE — PROGRESS NOTES
RHEUMATOLOGY CLINIC FOLLOW UP VISIT    Chief complaints:- Myositis       HPI:-  Ermelinda Javed a 59 y.o.F with history of L sided infiltrating ductal carcinoma of the L breast (dx on Jan 2017), HTN, depression, gastritis, and recently diagnosed myositis (uncertain if it's autoimmune vs medication induced), present today with concern about myositis flare    Patient was initially send from hem/onc clinic for evaluation of possible inflammatory myositis.  Patient was hospitalized from 1/22/19 till 2/13/19 for myositis.      L breast cancer s/p completion of 4 cycles of demi-adjuvant taxotere and cytoxan (completed on 5/9/17) and mastectomy (6/26/17) and then 4 cycles of adjuvant Adriamycin (completed 9/12/17). In 10/2017 - patient developed L supraclavicular lymphadenopathy which FNA confirmed as reoccurrence of previously treated breast cancer.      Patient started on Atelizumab/Abraxane on 11/7/18. Per chart review, patient noticed rashes on the dorsum of her hands on 11/11/18. Seen in urgent care and given topical steroid cream. Then received two more infusions on Atelizumab/Abraxane on 11/21/18 and 12/5/18. Started to notice puffiness around the eyes on 12/11/18. Received another Abraxane infusion on 12/12/18 but this time with hydrocortisone 50 mg which helped reduce the swelling around the eyes. Developed swelling of the face again on 12/15/18. Next infusion of Abraxane done on 12/19/18 with Solucortef and Atelizumab held. Abraxane given again on 1/3/19 with no IV steroid. Patient developed swelling of the face on 1/4/19 and went to urgent care and given short course of prednisone 20 mg BID. On 1/15/19, patient seen in hem/onc clinic with c/o of pressure and tightness around neck. CT scan of chest and neck did not reveal any vascular compression. Started on prednisone 60 mg with taper. On 1/22/18 patient with c/o proximal muscle weakness. CPK found to be elevated around 4k. Patient  admitted and given solumedrol 80 mg IV on 1/22/18. Last infusion of Atelizumab on 12/19/18. Last infusion of Abraxane on 1/3/19. Given Solumedrol 1g x 1 on 1/23/18. Seen by Dermatology on 1/24/18 and biopsy done of skin rash. Given distribution of rashes, there was concern for dermatomyositis. Patient started on Solumedrol 125 mg IV BID at this time.     During her hospitalization patient was started on high dose steroid Solumedrol 80mg IV x 1, 1g x 1, and then 125mg BID until tapered down to medrol 48mg.  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  Patient was started on IVIG (400mg/kg/daily x 5 day) 1/29/19-2/2.   Patient started on MTX 20mg SQ (Feb 5 2019) with folic acid. MTX SQ was transitioned to PO because concern about rehab administration.  Patient failed swallow eval and PEG tube was placed.        Denies any family history of autoimmune diseases.     No smoking, EtOH, recreational drug usage.     Denies any photosensitivity, joint swelling, unintentional weigh loss, abdominal pain, night sweats, CP, SOB.  +oral thrush.     Completed rehab at [facility].  Completed IVIG (2/28 and 3/1).  Had mild HA after infusion.  Doing well.  A lot stronger - able to do household chores since discharge.  Not back at baseline yet ~80%.  Mild weakness with increased activities.  Able to ambulate without assistance (but is still a fall precaution).  Tolerating diet via PEG tube.  Completed rehab.     MTX discontinued 2/18 with concern of toxicity (decrease blood counts and transaminatis).  Folic acid increased to 4mg daily.  Still on medrol 32mg daily with atorvaquone for PCP prophylaxis.  Bactrim d/c because of interaction with leucovorin.  Leucovorin 5mg daily started - Leucovorin discontinued.  Continues to be on folic acid 4mg daily    Radiation completed (28 days) completed May 8.      EGD/colonoscopy done on July 29 - normal.  PEG tube removed    Last PET scan (June 20) showed negative for metastasis.  At  this time, will monitor per oncology and repeat PET scan in Sept.  No treatment needed at this time.     At the last visit, Cellcept was increased to 500mg BID and prednisone 15mg daily.       Interval History     Rash was biopsied and evaluated by dermatology.  Biopsy report conclusive of dermatomyositis.  Was given topical steroid which provided minimal alleviation of symptoms.  +pruitius     Patient complaining of generalized swelling that started since IVIG had been changed to 1g/day every 2 weeks.  Patient also complaining of gum and tongue hypersensitivity.  Improvement of myalgia.     Denies any dysphagia, alopecia, arthralgia, abdominal pain, CP, SOB, N/V, chills, or urinary symptoms.        Review of Systems   Constitutional: Negative for chills, diaphoresis, fever, malaise/fatigue and weight loss.   HENT: Negative for congestion, ear discharge, ear pain, hearing loss, nosebleeds, sinus pain and tinnitus.    Eyes: Negative for photophobia, pain, discharge and redness.   Respiratory: Negative for cough, hemoptysis, sputum production, shortness of breath, wheezing and stridor.    Cardiovascular: Negative for chest pain, palpitations, orthopnea, claudication, leg swelling and PND.   Gastrointestinal: Negative for abdominal pain, constipation, diarrhea, heartburn, nausea and vomiting.   Genitourinary: Negative for dysuria, frequency, hematuria and urgency.   Musculoskeletal: Positive for myalgias. Negative for back pain, joint pain and neck pain.   Skin: Positive for itching and rash.   Neurological: Positive for weakness. Negative for dizziness, tingling, tremors and headaches.   Endo/Heme/Allergies: Does not bruise/bleed easily.   Psychiatric/Behavioral: Negative for depression, hallucinations and suicidal ideas. The patient is not nervous/anxious and does not have insomnia.         Past Medical History:   Diagnosis Date    Breast cancer 01/01/2017    left    Depression     Dermatomyositis      Diverticulosis     Gastritis     Hypertension     Vitamin B12 deficiency 3/8/2018       Past Surgical History:   Procedure Laterality Date    BREAST BIOPSY Left     BREAST RECONSTRUCTION Left 2017     SECTION      COLONOSCOPY  2011    repeat in 10 yrs.    COLONOSCOPY N/A 2019    Procedure: COLONOSCOPY;  Surgeon: Pernell Rosas MD;  Location: [facility] (4TH FLR);  Service: Endoscopy;  Laterality: N/A;  Patient would like to use her PEG tube for bowel prep and then have her PEG tube removed after EGD and colonoscopy procedures while she is still sedated.    D&C      ESOPHAGOGASTRODUODENOSCOPY N/A 2019    Procedure: EGD (ESOPHAGOGASTRODUODENOSCOPY);  Surgeon: Pernell Rosas MD;  Location: [facility] (2ND FLR);  Service: Endoscopy;  Laterality: N/A;    ESOPHAGOGASTRODUODENOSCOPY N/A 2019    Procedure: EGD (ESOPHAGOGASTRODUODENOSCOPY);  Surgeon: Pernell Rosas MD;  Location: [facility] (4TH FLR);  Service: Endoscopy;  Laterality: N/A;  EGD for follow-up of erosive esophagitis per Dr. Rosas    Patient would like to use her PEG tube for bowel prep and then have her PEG tube removed after EGD and colonoscopy procedures while she is still sedated.    INSERTION OF TUNNELED CENTRAL VENOUS CATHETER (CVC) WITH SUBCUTANEOUS PORT Right 10/31/2018    Procedure: [id number];  Surgeon: Deo Palencia MD;  Location: [address];  Service: General;  Laterality: Right;    MASTECTOMY Left 2017    MYOMECTOMY      PORTACATH PLACEMENT      SENTINEL LYMPH NODE BIOPSY  2017    left    TOTAL REDUCTION MAMMOPLASTY Right 2017    UPPER GASTROINTESTINAL ENDOSCOPY          Social History     Tobacco Use    Smoking status: Never Smoker    Smokeless tobacco: Never Used   Substance Use Topics    Alcohol use: Yes     Frequency: Never     Drinks per session: Patient refused     Binge frequency: Never     Comment: rare    Drug use: No       Family History   Problem Relation Age of Onset     Heart disease Father     Hypertension Father     Breast cancer Sister 52    Hypertension Mother     No Known Problems Brother     Thyroid disease Daughter         hyperthyroidism s/p thyroidectomy    No Known Problems Son     No Known Problems Brother     No Known Problems Brother     No Known Problems Sister     No Known Problems Daughter     Colon cancer Neg Hx     Ovarian cancer Neg Hx     Celiac disease Neg Hx     Cirrhosis Neg Hx     Colon polyps Neg Hx     Esophageal cancer Neg Hx     Inflammatory bowel disease Neg Hx     Liver cancer Neg Hx     Liver disease Neg Hx     Rectal cancer Neg Hx     Stomach cancer Neg Hx     Ulcerative colitis Neg Hx     Melanoma Neg Hx        Review of patient's allergies indicates:  No Known Allergies        Physical examination:-    There were no vitals filed for this visit.    Physical Exam   Constitutional: She is oriented to person, place, and time and well-developed, well-nourished, and in no distress.   HENT:   Head: Normocephalic and atraumatic.   No oral mouth ulcers   Eyes: Pupils are equal, round, and reactive to light. Conjunctivae are normal.   Neck: Neck supple. No tracheal deviation (tpmt) present.   Cardiovascular: Normal rate, regular rhythm and normal heart sounds.   Pulmonary/Chest: Effort normal and breath sounds normal.   Abdominal: Soft. Bowel sounds are normal. Rebound: pmt.   Musculoskeletal: She exhibits edema. She exhibits no tenderness or deformity.   Right Side Rheumatological Exam      Muscle Strength (0-5 scale):  Neck Flexion:  4.5  Neck Extension: 5  Deltoid:  4/5  Biceps: 5/5   Triceps:  5  : 5/5   Iliopsoas: 4/5  Quadriceps:  4/5   Distal Lower Extremity: 5     Left Side Rheumatological Exam      Muscle Strength (0-5 scale):  Neck Flexion:  4.5  Neck Extension: 5  Deltoid:  5  Biceps: /5   Triceps:  5  :  5  Iliopsoas: 4.5/5  Quadriceps:  4.5/5   Distal Lower Extremity: 5     ROM intact     Neurological: She is alert  and oriented to person, place, and time. Gait normal.   Skin: Skin is warm and dry.   Guttron's papules on hands  Bilateral helitropic rash - improvement since last clinic visit  Bilateral anterior thigh - erythematous (mild) non raised rash   Elbows - hypopigmentation   Psychiatric: Mood, memory, affect and judgment normal.             Radiographs:-  Independent visualization of images done.   CXR (1/28) - clear lungs  PET scan - no pulmonary/GI activity.       Medication List with Changes/Refills   Current Medications    AMLODIPINE (NORVASC) 10 MG TABLET    Take 1 tablet (10 mg total) by mouth once daily.    AZELASTINE (ASTELIN) 137 MCG (0.1 %) NASAL SPRAY    1 spray (137 mcg total) by Nasal route 2 (two) times daily.    CALCIUM CARBONATE (OS-ESTELA) 600 MG CALCIUM (1,500 MG) TAB    Take 600 mg by mouth 2 (two) times daily with meals.    CLOBETASOL (TEMOVATE) 0.05 % CREAM    Apply topically 2 (two) times daily.    FOLIC ACID (FOLVITE) 1 MG TABLET    TAKE 4 TABLETS(4 MG) VIA GTUBE EVERY DAY    LEVOCETIRIZINE (XYZAL) 5 MG TABLET    Take 1 tablet (5 mg total) by mouth every evening.    MYCOPHENOLATE (CELLCEPT) 500 MG TAB    Take 1 tablet (500 mg total) by mouth 2 (two) times daily.    TRIAMCINOLONE ACETONIDE 0.025% (KENALOG) 0.025 % CREAM    AAA on face BID x 1-2 wks then prn flares only    VITAMIN D (VITAMIN D3) 1000 UNITS TAB    Take 1,000 Units by mouth once daily.       Assessment/Plans:-   58 y.o.F with history of L sided infiltrating ductal carcinoma of the L breast (dx on Jan 2017), HTN, depression, gastritis, and recently diagnosed myositis (uncertain if it's autoimmune vs medication induced), present today for follow up because of concern about myositis flare.     Patient started on Atelizumab/Abraxane on 11/7/18.  Patient developed swelling of the face on 1/4/19 and went to urgent care and given short course of prednisone 20 mg BID. On 1/15/19, patient seen in hem/onc clinic with c/o of pressure and tightness  around neck. CT scan of chest and neck did not reveal any vascular compression. Started on prednisone 60 mg with taper. On 1/22/18 patient with c/o proximal muscle weakness. CPK found to be elevated around 4k. Patient admitted and given solumedrol 80 mg IV on 1/22/18. Last infusion of Atelizumab on 12/19/18. Last infusion of Abraxane on 1/3/19. Given Solumedrol 1g x 1 on 1/23/18. Seen by Dermatology on 1/24/18 and biopsy done of skin rash. Given distribution of rashes, there was concern for dermatomyositis. Patient started on Solumedrol 125 mg IV BID at that time.  Skin biopsy resulted consistent with dermatomyositis.     Labs: CPK and Aldolase normalized. +DARCY 1:640 speckled with negative profile.  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 quantTB: indeterminate, T-spot: negative     Spirometry: normal, normal diffusion capacity. FVC: 81%, DLCO: 114%     Patient exhibits signs of dermatomyositis (heliotropic rash and gottron's papules).   Dermatomyositis can be associated with malignancy.  MRI of LUE showed edema of the deltoid and biceps.  Exam with proximal muscle weakness: b/l deltoids 4/5, b/l iliopsoas 4.4/5. 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   Given +TIF1 gamma positivity, most likely dermatomyositis is from underlying malignancy.      Failed 2nd swallow eval on 2/6/19. PEG placed 2/7/2019.     Current medications:  - prednisone 15mg   - IVIG Started Jan 2019 - now (last dose Dec 10/11)  - folic acid daily 4mg    Esophageal biopsy - negative for viral infection.  Nonspecific chronic  inflammation.    EGD/Colonoscopy (July 2019) - normal. PEG tube removed     Fiboscan done Aug 2019 - showed fatty liver with no scarring/damage.,     At this time, patient failed steroid tapering (myalgia and rash).  Currently on Cellcept 500mg BID and prednisone 15mg daily. Cellcept was recently increased - uncertain if that is effective.     Recent studies demonstrated increased efficacy in IVIG when spaced out (Q2w instead of Q4w)    Patient is an intermediate metabolizer based on TPMT.  Cannot start imuran.  Labs still neutropenic and thrombocytopenic at this time - uncertain of the cause (radiation induce BM fibrosis vs medication induce?)     Patient unable to tolerate steroid tapering.  Was started on Cellcept 500mg daily at the last visit, appears to be insufficient - will be increase in dosage (mild drop in leukopenia and elevation of AST).  Consideration for Rituxan as steroid sparring agent or increase IVIG to 3 weeks.      Vaccination completed - Prevnar and Shingles (completed Aug 2019) and flu vaccination for this season (Aug 2019)     Cellcept started (Sept 2019). Cellcept increased to 500mg BID at the last visit.     Recent labs show worsening leukopenia and mild elevation of AST.  Patient also complaining of gum/tongue hypersensitivity with generalized swelling.  Concern about Cellcept toxicity.  Will decrease cellcept to daily.  Patient's symptoms of myalgia and rash appears to be improving.       Plan:  - Decrease Cellcept 500mg to daily from BID  - CBC, CMP, CPK, Aldolase, ESR, CRP to be done prior to next visit  - Continue prednisone tapering.  Currently at 9mg - continue 1mg taper every 2 weeks   - continue folic acid 4mg daily  - continue muscle strengthening exercise daily  - referral to PT   - 1200 mg dietary calcium and Vitamin D3 1000 mg daily  - continue IVIG at 1g/kg x 1 dose every 2 weeks   - message/call immediately if worsening muscle weakness  - Information about rituxan provided to  patient.  All questions answered  - Education provided on the importance of sunscreen usage       # RTC in 4-6 weeks

## 2020-02-26 ENCOUNTER — PATIENT MESSAGE (OUTPATIENT)
Dept: RHEUMATOLOGY | Facility: CLINIC | Age: 60
End: 2020-02-26

## 2020-02-27 ENCOUNTER — PATIENT MESSAGE (OUTPATIENT)
Dept: RHEUMATOLOGY | Facility: CLINIC | Age: 60
End: 2020-02-27

## 2020-03-02 ENCOUNTER — PATIENT MESSAGE (OUTPATIENT)
Dept: RHEUMATOLOGY | Facility: CLINIC | Age: 60
End: 2020-03-02

## 2020-03-04 ENCOUNTER — PATIENT MESSAGE (OUTPATIENT)
Dept: RHEUMATOLOGY | Facility: CLINIC | Age: 60
End: 2020-03-04

## 2020-03-05 ENCOUNTER — TELEPHONE (OUTPATIENT)
Dept: RHEUMATOLOGY | Facility: CLINIC | Age: 60
End: 2020-03-05

## 2020-03-05 DIAGNOSIS — M33.13 DERMATOMYOSITIS: Primary | ICD-10-CM

## 2020-03-05 RX ORDER — PREDNISONE 1 MG/1
4 TABLET ORAL DAILY
Qty: 120 TABLET | Refills: 0 | Status: SHIPPED | OUTPATIENT
Start: 2020-03-05 | End: 2020-03-25 | Stop reason: SDUPTHER

## 2020-03-10 ENCOUNTER — INFUSION (OUTPATIENT)
Dept: INFUSION THERAPY | Facility: HOSPITAL | Age: 60
End: 2020-03-10
Attending: INTERNAL MEDICINE
Payer: COMMERCIAL

## 2020-03-10 VITALS
RESPIRATION RATE: 18 BRPM | TEMPERATURE: 98 F | SYSTOLIC BLOOD PRESSURE: 138 MMHG | WEIGHT: 192.25 LBS | HEART RATE: 97 BPM | BODY MASS INDEX: 32.03 KG/M2 | HEIGHT: 65 IN | DIASTOLIC BLOOD PRESSURE: 67 MMHG

## 2020-03-10 DIAGNOSIS — R13.19 ESOPHAGEAL DYSPHAGIA: ICD-10-CM

## 2020-03-10 DIAGNOSIS — M33.13 DERMATOMYOSITIS: Primary | ICD-10-CM

## 2020-03-10 PROCEDURE — 96365 THER/PROPH/DIAG IV INF INIT: CPT

## 2020-03-10 PROCEDURE — 96367 TX/PROPH/DG ADDL SEQ IV INF: CPT

## 2020-03-10 PROCEDURE — 25000003 PHARM REV CODE 250: Performed by: INTERNAL MEDICINE

## 2020-03-10 PROCEDURE — S0028 INJECTION, FAMOTIDINE, 20 MG: HCPCS | Performed by: INTERNAL MEDICINE

## 2020-03-10 PROCEDURE — A4216 STERILE WATER/SALINE, 10 ML: HCPCS | Performed by: INTERNAL MEDICINE

## 2020-03-10 PROCEDURE — 96375 TX/PRO/DX INJ NEW DRUG ADDON: CPT

## 2020-03-10 PROCEDURE — 63600175 PHARM REV CODE 636 W HCPCS: Performed by: INTERNAL MEDICINE

## 2020-03-10 PROCEDURE — 96366 THER/PROPH/DIAG IV INF ADDON: CPT

## 2020-03-10 PROCEDURE — 63600175 PHARM REV CODE 636 W HCPCS: Mod: JG | Performed by: STUDENT IN AN ORGANIZED HEALTH CARE EDUCATION/TRAINING PROGRAM

## 2020-03-10 RX ORDER — ACETAMINOPHEN 325 MG/1
650 TABLET ORAL
Status: COMPLETED | OUTPATIENT
Start: 2020-03-10 | End: 2020-03-10

## 2020-03-10 RX ORDER — HEPARIN 100 UNIT/ML
500 SYRINGE INTRAVENOUS
Status: DISCONTINUED | OUTPATIENT
Start: 2020-03-10 | End: 2020-03-10 | Stop reason: HOSPADM

## 2020-03-10 RX ORDER — ACETAMINOPHEN 325 MG/1
650 TABLET ORAL
Status: CANCELLED | OUTPATIENT
Start: 2020-03-16

## 2020-03-10 RX ORDER — FAMOTIDINE 10 MG/ML
20 INJECTION INTRAVENOUS
Status: COMPLETED | OUTPATIENT
Start: 2020-03-10 | End: 2020-03-10

## 2020-03-10 RX ORDER — HEPARIN 100 UNIT/ML
500 SYRINGE INTRAVENOUS
Status: CANCELLED | OUTPATIENT
Start: 2020-03-16

## 2020-03-10 RX ORDER — SODIUM CHLORIDE 0.9 % (FLUSH) 0.9 %
10 SYRINGE (ML) INJECTION
Status: CANCELLED | OUTPATIENT
Start: 2020-03-16

## 2020-03-10 RX ORDER — FAMOTIDINE 10 MG/ML
20 INJECTION INTRAVENOUS
Status: CANCELLED | OUTPATIENT
Start: 2020-03-16

## 2020-03-10 RX ORDER — SODIUM CHLORIDE 0.9 % (FLUSH) 0.9 %
10 SYRINGE (ML) INJECTION
Status: DISCONTINUED | OUTPATIENT
Start: 2020-03-10 | End: 2020-03-10 | Stop reason: HOSPADM

## 2020-03-10 RX ADMIN — DIPHENHYDRAMINE HYDROCHLORIDE 50 MG: 50 INJECTION INTRAMUSCULAR; INTRAVENOUS at 12:03

## 2020-03-10 RX ADMIN — FAMOTIDINE 20 MG: 10 INJECTION, SOLUTION INTRAVENOUS at 12:03

## 2020-03-10 RX ADMIN — ACETAMINOPHEN 650 MG: 325 TABLET ORAL at 12:03

## 2020-03-10 RX ADMIN — Medication 10 ML: at 04:03

## 2020-03-10 RX ADMIN — HEPARIN 500 UNITS: 100 SYRINGE at 04:03

## 2020-03-10 RX ADMIN — HUMAN IMMUNOGLOBULIN G 85 G: 40 LIQUID INTRAVENOUS at 01:03

## 2020-03-10 RX ADMIN — SODIUM CHLORIDE: 9 INJECTION, SOLUTION INTRAVENOUS at 12:03

## 2020-03-10 NOTE — PLAN OF CARE
1448 pt tolerated IVIG infusion without issue, pt to rtc 3/24/20, no distress noted upon d/c to home

## 2020-03-10 NOTE — PLAN OF CARE
1215 pt here for IVIG infusion for myositis, pt with facial swelling and rash noted from myositis, no other complaints at this time, hx, meds, allergies reviewed, pt belen contreras chair, continue to monitor

## 2020-03-18 ENCOUNTER — TELEPHONE (OUTPATIENT)
Dept: RHEUMATOLOGY | Facility: CLINIC | Age: 60
End: 2020-03-18

## 2020-03-18 NOTE — TELEPHONE ENCOUNTER
----- Message from Luz Maria Crabtree sent at 3/18/2020  4:11 PM CDT -----  Contact: pt  Patient would like to receive a call back regarding a message to reschedule her appointment. Patient states that she is in pain and she does not want to cancel. Please contact pt to advise.    Contact info

## 2020-03-18 NOTE — TELEPHONE ENCOUNTER
Returned patient's call.  No answer.  Message left for patient, that someone will call her tomorrow morning.

## 2020-03-19 ENCOUNTER — PATIENT MESSAGE (OUTPATIENT)
Dept: RHEUMATOLOGY | Facility: CLINIC | Age: 60
End: 2020-03-19

## 2020-03-19 NOTE — TELEPHONE ENCOUNTER
I called the patient and left a detailed message letting the patient know that if she insists on being seen by Dr. Swift that she will have to have her  stay in the car and she will have to be screened before come in for her appointment or she can do the virtual visit insisted by the department with her condition. I asked her to call or message on the portal to let me know what she plans to do and forwarded the message to the provider

## 2020-03-19 NOTE — TELEPHONE ENCOUNTER
----- Message from Carmen Saini RN sent at 3/18/2020  6:03 PM CDT -----  Contact: pt      ----- Message -----  From: Luz Maria Crabtree  Sent: 3/18/2020   4:11 PM CDT  To: Jeniffer Romo Staff    Patient would like to receive a call back regarding a message to reschedule her appointment. Patient states that she is in pain and she does not want to cancel. Please contact pt to advise.    Contact info

## 2020-03-20 ENCOUNTER — PATIENT MESSAGE (OUTPATIENT)
Dept: RHEUMATOLOGY | Facility: CLINIC | Age: 60
End: 2020-03-20

## 2020-03-23 ENCOUNTER — PATIENT MESSAGE (OUTPATIENT)
Dept: HEMATOLOGY/ONCOLOGY | Facility: CLINIC | Age: 60
End: 2020-03-23

## 2020-03-23 ENCOUNTER — TELEPHONE (OUTPATIENT)
Dept: INFUSION THERAPY | Facility: HOSPITAL | Age: 60
End: 2020-03-23

## 2020-03-24 ENCOUNTER — INFUSION (OUTPATIENT)
Dept: INFUSION THERAPY | Facility: HOSPITAL | Age: 60
End: 2020-03-24
Attending: INTERNAL MEDICINE
Payer: COMMERCIAL

## 2020-03-24 VITALS
TEMPERATURE: 99 F | HEIGHT: 65 IN | WEIGHT: 192.44 LBS | BODY MASS INDEX: 32.06 KG/M2 | DIASTOLIC BLOOD PRESSURE: 68 MMHG | RESPIRATION RATE: 18 BRPM | HEART RATE: 97 BPM | SYSTOLIC BLOOD PRESSURE: 135 MMHG

## 2020-03-24 DIAGNOSIS — M33.13 DERMATOMYOSITIS: Primary | ICD-10-CM

## 2020-03-24 DIAGNOSIS — R13.19 ESOPHAGEAL DYSPHAGIA: ICD-10-CM

## 2020-03-24 PROCEDURE — 63600175 PHARM REV CODE 636 W HCPCS: Performed by: INTERNAL MEDICINE

## 2020-03-24 PROCEDURE — 25000003 PHARM REV CODE 250: Performed by: INTERNAL MEDICINE

## 2020-03-24 PROCEDURE — S0028 INJECTION, FAMOTIDINE, 20 MG: HCPCS | Performed by: INTERNAL MEDICINE

## 2020-03-24 PROCEDURE — 63600175 PHARM REV CODE 636 W HCPCS: Mod: JG | Performed by: STUDENT IN AN ORGANIZED HEALTH CARE EDUCATION/TRAINING PROGRAM

## 2020-03-24 PROCEDURE — 96375 TX/PRO/DX INJ NEW DRUG ADDON: CPT

## 2020-03-24 PROCEDURE — 96367 TX/PROPH/DG ADDL SEQ IV INF: CPT

## 2020-03-24 PROCEDURE — 96366 THER/PROPH/DIAG IV INF ADDON: CPT

## 2020-03-24 PROCEDURE — 96365 THER/PROPH/DIAG IV INF INIT: CPT

## 2020-03-24 RX ORDER — SODIUM CHLORIDE 0.9 % (FLUSH) 0.9 %
10 SYRINGE (ML) INJECTION
Status: CANCELLED | OUTPATIENT
Start: 2020-04-07

## 2020-03-24 RX ORDER — HEPARIN 100 UNIT/ML
500 SYRINGE INTRAVENOUS
Status: CANCELLED | OUTPATIENT
Start: 2020-04-07

## 2020-03-24 RX ORDER — FAMOTIDINE 10 MG/ML
20 INJECTION INTRAVENOUS
Status: CANCELLED | OUTPATIENT
Start: 2020-04-07

## 2020-03-24 RX ORDER — ACETAMINOPHEN 325 MG/1
650 TABLET ORAL
Status: COMPLETED | OUTPATIENT
Start: 2020-03-24 | End: 2020-03-24

## 2020-03-24 RX ORDER — ACETAMINOPHEN 325 MG/1
650 TABLET ORAL
Status: CANCELLED | OUTPATIENT
Start: 2020-04-07

## 2020-03-24 RX ORDER — HEPARIN 100 UNIT/ML
500 SYRINGE INTRAVENOUS
Status: DISCONTINUED | OUTPATIENT
Start: 2020-03-24 | End: 2020-03-24 | Stop reason: HOSPADM

## 2020-03-24 RX ORDER — SODIUM CHLORIDE 0.9 % (FLUSH) 0.9 %
10 SYRINGE (ML) INJECTION
Status: DISCONTINUED | OUTPATIENT
Start: 2020-03-24 | End: 2020-03-24 | Stop reason: HOSPADM

## 2020-03-24 RX ORDER — FAMOTIDINE 10 MG/ML
20 INJECTION INTRAVENOUS
Status: COMPLETED | OUTPATIENT
Start: 2020-03-24 | End: 2020-03-24

## 2020-03-24 RX ADMIN — HUMAN IMMUNOGLOBULIN G 85 G: 40 LIQUID INTRAVENOUS at 02:03

## 2020-03-24 RX ADMIN — FAMOTIDINE 20 MG: 10 INJECTION INTRAVENOUS at 01:03

## 2020-03-24 RX ADMIN — SODIUM CHLORIDE: 9 INJECTION, SOLUTION INTRAVENOUS at 01:03

## 2020-03-24 RX ADMIN — HEPARIN 500 UNITS: 100 SYRINGE at 05:03

## 2020-03-24 RX ADMIN — DIPHENHYDRAMINE HYDROCHLORIDE 50 MG: 50 INJECTION, SOLUTION INTRAMUSCULAR; INTRAVENOUS at 01:03

## 2020-03-24 RX ADMIN — ACETAMINOPHEN 650 MG: 325 TABLET ORAL at 01:03

## 2020-03-24 NOTE — PLAN OF CARE
1315 pt here for IVIG infusion, labs, hx, meds, allergies reviewed, pt with no complaints at this time, pt reclined in chair, continue to monitor

## 2020-03-24 NOTE — PLAN OF CARE
Patient tolerated IVIG infusion with no complications. VS stable post-treatment. Patient care transferred from EVER Singh to LUISA Pearl RN at 1700. Port flushed, heparin locked, and de-accessed prior to discharge. AVS provided. Discharged home in no acute distress.

## 2020-03-25 ENCOUNTER — PATIENT MESSAGE (OUTPATIENT)
Dept: RHEUMATOLOGY | Facility: CLINIC | Age: 60
End: 2020-03-25

## 2020-03-25 ENCOUNTER — OFFICE VISIT (OUTPATIENT)
Dept: RHEUMATOLOGY | Facility: CLINIC | Age: 60
End: 2020-03-25
Payer: COMMERCIAL

## 2020-03-25 DIAGNOSIS — M35.9 POLYMYOSITIS ASSOCIATED WITH AUTOIMMUNE DISEASE: ICD-10-CM

## 2020-03-25 DIAGNOSIS — M33.20 POLYMYOSITIS ASSOCIATED WITH AUTOIMMUNE DISEASE: ICD-10-CM

## 2020-03-25 DIAGNOSIS — Z79.899 IMMUNOSUPPRESSION DUE TO DRUG THERAPY: ICD-10-CM

## 2020-03-25 DIAGNOSIS — D84.821 IMMUNOSUPPRESSION DUE TO DRUG THERAPY: ICD-10-CM

## 2020-03-25 DIAGNOSIS — T45.1X5A CHEMOTHERAPY-INDUCED NEUROPATHY: ICD-10-CM

## 2020-03-25 DIAGNOSIS — M33.13 DERMATOMYOSITIS: Primary | ICD-10-CM

## 2020-03-25 DIAGNOSIS — G62.0 CHEMOTHERAPY-INDUCED NEUROPATHY: ICD-10-CM

## 2020-03-25 PROCEDURE — 99213 OFFICE O/P EST LOW 20 MIN: CPT | Mod: 95,,, | Performed by: STUDENT IN AN ORGANIZED HEALTH CARE EDUCATION/TRAINING PROGRAM

## 2020-03-25 PROCEDURE — 99213 PR OFFICE/OUTPT VISIT, EST, LEVL III, 20-29 MIN: ICD-10-PCS | Mod: 95,,, | Performed by: STUDENT IN AN ORGANIZED HEALTH CARE EDUCATION/TRAINING PROGRAM

## 2020-03-25 RX ORDER — PREDNISONE 10 MG/1
20 TABLET ORAL DAILY
Qty: 60 TABLET | Refills: 0 | Status: SHIPPED | OUTPATIENT
Start: 2020-03-25 | End: 2020-04-24

## 2020-03-25 RX ORDER — PREDNISONE 1 MG/1
4 TABLET ORAL DAILY
Qty: 120 TABLET | Refills: 0 | Status: SHIPPED | OUTPATIENT
Start: 2020-03-25 | End: 2020-04-24

## 2020-03-25 ASSESSMENT — ROUTINE ASSESSMENT OF PATIENT INDEX DATA (RAPID3)
MDHAQ FUNCTION SCORE: .4
FATIGUE SCORE: 2
PSYCHOLOGICAL DISTRESS SCORE: 3.3
TOTAL RAPID3 SCORE: 2.94
PAIN SCORE: 3.5
AM STIFFNESS SCORE: 1, YES
PATIENT GLOBAL ASSESSMENT SCORE: 4

## 2020-03-25 NOTE — PROGRESS NOTES
MEDICATION, ALLERGIES, AND PHARMACY VERIFIED  Answers for HPI/ROS submitted by the patient on 3/19/2020   fever: No  eye redness: No  headaches: No  shortness of breath: No  chest pain: No  trouble swallowing: No  diarrhea: No  constipation: No  unexpected weight change: No  genital sore: No  dysuria: No  During the last 3 days, have you had a skin rash?: Yes  Bruises or bleeds easily: No  cough: No

## 2020-03-25 NOTE — PROGRESS NOTES
RHEUMATOLOGY CLINIC FOLLOW UP VISIT      Chief complaints:- Myositis     The patient location is: home   The chief complaint leading to consultation is: Myositis follow up   Visit type: Virtual visit with synchronous audio and video  Total time spent with patient: 45 mins   Each patient to whom he or she provides medical services by telemedicine is:  (1) informed of the relationship between the physician and patient and the respective role of any other health care provider with respect to management of the patient; and (2) notified that he or she may decline to receive medical services by telemedicine and may withdraw from such care at any time.    Notes:   Since the last clinical visit, patient states that she did not tolerate steroid tapering.  Worsening edema and rash when she was down to prednisone 7mg.  Increased it back to 10mg just a couple days ago.   Did not see any differences.  Tolerated last IVIG (yesterday).    Patient states that she had not felt good since the increase in cellcept from 500mg daily to bid.  No difference after decreasing the back to daily.      Denies any dysphagia, myalgia, SOB, urinary/bowel symptoms, alopecia, mouth ulcers, unintentional weigh loss     HPI:-  Ermelinda Javed a 59 y.o.F with history of L sided infiltrating ductal carcinoma of the L breast (dx on Jan 2017), HTN, depression, gastritis, and recently diagnosed myositis (uncertain if it's autoimmune vs medication induced), present today with concern about myositis flare    Patient was initially send from hem/onc clinic for evaluation of possible inflammatory myositis.  Patient was hospitalized from 1/22/19 till 2/13/19 for myositis.      L breast cancer s/p completion of 4 cycles of demi-adjuvant taxotere and cytoxan (completed on 5/9/17) and mastectomy (6/26/17) and then 4 cycles of adjuvant Adriamycin (completed 9/12/17). In 10/2017 - patient developed L supraclavicular lymphadenopathy which  FNA confirmed as reoccurrence of previously treated breast cancer.      Patient started on Atelizumab/Abraxane on 11/7/18. Per chart review, patient noticed rashes on the dorsum of her hands on 11/11/18. Seen in urgent care and given topical steroid cream. Then received two more infusions on Atelizumab/Abraxane on 11/21/18 and 12/5/18. Started to notice puffiness around the eyes on 12/11/18. Received another Abraxane infusion on 12/12/18 but this time with hydrocortisone 50 mg which helped reduce the swelling around the eyes. Developed swelling of the face again on 12/15/18. Next infusion of Abraxane done on 12/19/18 with Solucortef and Atelizumab held. Abraxane given again on 1/3/19 with no IV steroid. Patient developed swelling of the face on 1/4/19 and went to urgent care and given short course of prednisone 20 mg BID. On 1/15/19, patient seen in hem/onc clinic with c/o of pressure and tightness around neck. CT scan of chest and neck did not reveal any vascular compression. Started on prednisone 60 mg with taper. On 1/22/18 patient with c/o proximal muscle weakness. CPK found to be elevated around 4k. Patient admitted and given solumedrol 80 mg IV on 1/22/18. Last infusion of Atelizumab on 12/19/18. Last infusion of Abraxane on 1/3/19. Given Solumedrol 1g x 1 on 1/23/18. Seen by Dermatology on 1/24/18 and biopsy done of skin rash. Given distribution of rashes, there was concern for dermatomyositis. Patient started on Solumedrol 125 mg IV BID at this time.     During her hospitalization patient was started on high dose steroid Solumedrol 80mg IV x 1, 1g x 1, and then 125mg BID until tapered down to medrol 48mg.  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  Patient was started on IVIG (400mg/kg/daily x 5 day) 1/29/19-2/2.   Patient started on MTX 20mg SQ (Feb 5 2019) with folic acid. MTX SQ was transitioned to PO because concern about rehab administration.  Patient failed swallow eval and PEG tube was  placed.        Denies any family history of autoimmune diseases.     No smoking, EtOH, recreational drug usage.     Denies any photosensitivity, joint swelling, unintentional weigh loss, abdominal pain, night sweats, CP, SOB.  +oral thrush.     Completed rehab at [facility].  Completed IVIG (2/28 and 3/1).  Had mild HA after infusion.  Doing well.  A lot stronger - able to do household chores since discharge.  Not back at baseline yet ~80%.  Mild weakness with increased activities.  Able to ambulate without assistance (but is still a fall precaution).  Tolerating diet via PEG tube.  Completed rehab.     MTX discontinued 2/18 with concern of toxicity (decrease blood counts and transaminatis).  Folic acid increased to 4mg daily.  Still on medrol 32mg daily with atorvaquone for PCP prophylaxis.  Bactrim d/c because of interaction with leucovorin.  Leucovorin 5mg daily started - Leucovorin discontinued.  Continues to be on folic acid 4mg daily    Radiation completed (28 days) completed May 8.      EGD/colonoscopy done on July 29 - normal.  PEG tube removed    Last PET scan (June 20) showed negative for metastasis.  At this time, will monitor per oncology and repeat PET scan in Sept.  No treatment needed at this time.     At the last visit, Cellcept was increased to 500mg BID and prednisone 15mg daily.       Interval History     Rash was biopsied and evaluated by dermatology.  Biopsy report conclusive of dermatomyositis.  Was given topical steroid which provided minimal alleviation of symptoms.  +pruitius     Patient complaining of generalized swelling that started since IVIG had been changed to 1g/day every 2 weeks.  Patient also complaining of gum and tongue hypersensitivity.  Improvement of myalgia.     Denies any dysphagia, alopecia, arthralgia, abdominal pain, CP, SOB, N/V, chills, or urinary symptoms.        Review of Systems   Constitutional: Negative for chills, diaphoresis, fever, malaise/fatigue and weight loss.    HENT: Negative for congestion, ear discharge, ear pain, hearing loss, nosebleeds, sinus pain and tinnitus.    Eyes: Negative for photophobia, pain, discharge and redness.   Respiratory: Negative for cough, hemoptysis, sputum production, shortness of breath, wheezing and stridor.    Cardiovascular: Negative for chest pain, palpitations, orthopnea, claudication, leg swelling and PND.   Gastrointestinal: Negative for abdominal pain, constipation, diarrhea, heartburn, nausea and vomiting.   Genitourinary: Negative for dysuria, frequency, hematuria and urgency.   Musculoskeletal: Positive for myalgias. Negative for back pain, joint pain and neck pain.   Skin: Positive for itching and rash.   Neurological: Positive for weakness. Negative for dizziness, tingling, tremors and headaches.   Endo/Heme/Allergies: Does not bruise/bleed easily.   Psychiatric/Behavioral: Negative for depression, hallucinations and suicidal ideas. The patient is not nervous/anxious and does not have insomnia.         Past Medical History:   Diagnosis Date    Breast cancer 2017    left    Depression     Dermatomyositis     Diverticulosis     Gastritis     Hypertension     Vitamin B12 deficiency 3/8/2018       Past Surgical History:   Procedure Laterality Date    BREAST BIOPSY Left     BREAST RECONSTRUCTION Left 2017     SECTION      COLONOSCOPY  2011    repeat in 10 yrs.    COLONOSCOPY N/A 2019    Procedure: COLONOSCOPY;  Surgeon: Pernell Rosas MD;  Location: [facility] ([facility] FLR);  Service: Endoscopy;  Laterality: N/A;  Patient would like to use her PEG tube for bowel prep and then have her PEG tube removed after EGD and colonoscopy procedures while she is still sedated.    D&C      ESOPHAGOGASTRODUODENOSCOPY N/A 2019    Procedure: EGD (ESOPHAGOGASTRODUODENOSCOPY);  Surgeon: Pernell Rosas MD;  [facility]  Service: Endoscopy;  Laterality: N/A;    ESOPHAGOGASTRODUODENOSCOPY N/A 2019     Procedure: EGD (ESOPHAGOGASTRODUODENOSCOPY);  Surgeon: Pernell Rosas MD;  Location: [facility] (4TH FLR);  Service: Endoscopy;  Laterality: N/A;  EGD for follow-up of erosive esophagitis per Dr. Rosas    Patient would like to use her PEG tube for bowel prep and then have her PEG tube removed after EGD and colonoscopy procedures while she is still sedated.    INSERTION OF TUNNELED CENTRAL VENOUS CATHETER (CVC) WITH SUBCUTANEOUS PORT Right 10/31/2018    Procedure: [id number];  Surgeon: Deo Palencia MD;  [facility];  Service: General;  Laterality: Right;    MASTECTOMY Left 06/26/2017    MYOMECTOMY      PORTACATH PLACEMENT      SENTINEL LYMPH NODE BIOPSY  02/2017    left    TOTAL REDUCTION MAMMOPLASTY Right 2017    UPPER GASTROINTESTINAL ENDOSCOPY          Social History     Tobacco Use    Smoking status: Never Smoker    Smokeless tobacco: Never Used   Substance Use Topics    Alcohol use: Yes     Frequency: Never     Drinks per session: Patient refused     Binge frequency: Never     Comment: rare    Drug use: No       Family History   Problem Relation Age of Onset    Heart disease Father     Hypertension Father     Breast cancer Sister 52    Hypertension Mother     No Known Problems Brother     Thyroid disease Daughter         hyperthyroidism s/p thyroidectomy    No Known Problems Son     No Known Problems Brother     No Known Problems Brother     No Known Problems Sister     No Known Problems Daughter     Colon cancer Neg Hx     Ovarian cancer Neg Hx     Celiac disease Neg Hx     Cirrhosis Neg Hx     Colon polyps Neg Hx     Esophageal cancer Neg Hx     Inflammatory bowel disease Neg Hx     Liver cancer Neg Hx     Liver disease Neg Hx     Rectal cancer Neg Hx     Stomach cancer Neg Hx     Ulcerative colitis Neg Hx     Melanoma Neg Hx        Review of patient's allergies indicates:  No Known Allergies        Physical examination:-    There were no vitals filed  for this visit.    Physical Exam   Constitutional: She is oriented to person, place, and time.   Musculoskeletal:   ROM intact    Neurological: She is alert and oriented to person, place, and time. Gait normal.   Skin:   Periorbital swelling and edema   Shawl sign              Radiographs:-  Independent visualization of images done.   CXR (1/28) - clear lungs  PET scan - no pulmonary/GI activity.       Medication List with Changes/Refills   Current Medications    AMLODIPINE (NORVASC) 10 MG TABLET    Take 1 tablet (10 mg total) by mouth once daily.    AZELASTINE (ASTELIN) 137 MCG (0.1 %) NASAL SPRAY    1 spray (137 mcg total) by Nasal route 2 (two) times daily.    CALCIUM CARBONATE (OS-ESTELA) 600 MG CALCIUM (1,500 MG) TAB    Take 600 mg by mouth 2 (two) times daily with meals.    CLOBETASOL (TEMOVATE) 0.05 % CREAM    Apply topically 2 (two) times daily.    MYCOPHENOLATE (CELLCEPT) 500 MG TAB    Take 1 tablet (500 mg total) by mouth once daily.    PREDNISONE (DELTASONE) 1 MG TABLET    Take 4 tablets (4 mg total) by mouth once daily.    PREDNISONE (DELTASONE) 10 MG TABLET        TRIAMCINOLONE ACETONIDE 0.025% (KENALOG) 0.025 % CREAM    AAA on face BID x 1-2 wks then prn flares only    VITAMIN D (VITAMIN D3) 1000 UNITS TAB    Take 1,000 Units by mouth once daily.   Discontinued Medications    FOLIC ACID (FOLVITE) 1 MG TABLET    TAKE 4 TABLETS(4 MG) VIA GTUBE EVERY DAY    LEVOCETIRIZINE (XYZAL) 5 MG TABLET    Take 1 tablet (5 mg total) by mouth every evening.       Assessment/Plans:-   58 y.o.F with history of L sided infiltrating ductal carcinoma of the L breast (dx on Jan 2017), HTN, depression, gastritis, and recently diagnosed myositis (uncertain if it's autoimmune vs medication induced), present today for follow up because of concern about myositis flare.     Patient started on Atelizumab/Abraxane on 11/7/18.  Patient developed swelling of the face on 1/4/19 and went to urgent care and given short course of prednisone  20 mg BID. On 1/15/19, patient seen in hem/onc clinic with c/o of pressure and tightness around neck. CT scan of chest and neck did not reveal any vascular compression. Started on prednisone 60 mg with taper. On 1/22/18 patient with c/o proximal muscle weakness. CPK found to be elevated around 4k. Patient admitted and given solumedrol 80 mg IV on 1/22/18. Last infusion of Atelizumab on 12/19/18. Last infusion of Abraxane on 1/3/19. Given Solumedrol 1g x 1 on 1/23/18. Seen by Dermatology on 1/24/18 and biopsy done of skin rash. Given distribution of rashes, there was concern for dermatomyositis. Patient started on Solumedrol 125 mg IV BID at that time.  Skin biopsy resulted consistent with dermatomyositis.     Labs: CPK and Aldolase normalized. +DARCY 1:640 speckled with negative profile.  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 quantTB: indeterminate, T-spot: negative     Spirometry: normal, normal diffusion capacity. FVC: 81%, DLCO: 114%     Patient exhibits signs of dermatomyositis (heliotropic rash and gottron's papules).   Dermatomyositis can be associated with malignancy.  MRI of LUE showed edema of the deltoid and biceps.  Exam with proximal muscle weakness: b/l deltoids 4/5, b/l iliopsoas 4.4/5. 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   Given +TIF1 gamma positivity, most likely dermatomyositis is from underlying malignancy.      Failed 2nd swallow eval on 2/6/19. PEG placed 2/7/2019.     Current medications:  - prednisone 15mg   - IVIG Started Jan 2019 - now (last dose Dec 10/11)  - folic  acid daily 4mg    Esophageal biopsy - negative for viral infection.  Nonspecific chronic inflammation.    EGD/Colonoscopy (July 2019) - normal. PEG tube removed     Fiboscan done Aug 2019 - showed fatty liver with no scarring/damage.,     At this time, patient failed steroid tapering (myalgia and rash).  Currently on Cellcept 500mg BID and prednisone 15mg daily. Cellcept was recently increased - uncertain if that is effective.     Recent studies demonstrated increased efficacy in IVIG when spaced out (Q2w instead of Q4w)    Patient is an intermediate metabolizer based on TPMT.  Cannot start imuran.  Labs still neutropenic and thrombocytopenic at this time - uncertain of the cause (radiation induce BM fibrosis vs medication induce?)     Patient unable to tolerate steroid tapering.  Was started on Cellcept 500mg daily at the last visit, appears to be insufficient - will be increase in dosage (mild drop in leukopenia and elevation of AST).  Consideration for Rituxan as steroid sparring agent or increase IVIG to 3 weeks.      Vaccination completed - Prevnar and Shingles (completed Aug 2019) and flu vaccination for this season (Aug 2019)     Cellcept started (Sept 2019). Patient did not tolerate cellcept increase (leukopenia).      Recent labs mild elevation of AST and elevated ESR.  Patient also complaining of gum/tongue hypersensitivity with generalized swelling.  Concern about Cellcept toxicity.  Appears that cellcept is ineffective in this patient and unable to tolerate increase.     Will plan for patient to start Rituxan.  Concern about flare - will increase prednisone to 20mg daily until Rituxan is initiated.      Plan:  - Discontinue cellcept 500mg daily  - CBC, CMP, CPK, Aldolase, ESR, CRP to be done prior to next visit  - Increase prednisone to 20mg daily  - continue folic acid 4mg daily  - continue muscle strengthening exercise daily  - referral to PT   - 1200 mg dietary calcium and Vitamin D3 1000 mg daily  -  continue IVIG at 1g/kg x 1 dose every 2 weeks   - Start Rituxan 1000mg x 2 - therapy plan in   - Education provided to patient about potential side effect and usage.    - Patient consent to there usage of Rituxan - on the phone with two witness in place (Dr. Swift and Amada Murrell MA).  All questions answered  - message/call immediately if worsening muscle weakness  - Information about rituxan provided to patient.  All questions answered  - Education provided on the importance of sunscreen usage       # RTC in 2 weeks

## 2020-03-25 NOTE — PROGRESS NOTES
Rapid3 Question Responses and Scores 3/19/2020   MDHAQ Score 0.4   Psychologic Score 3.3   Pain Score 3.5   When you awakened in the morning OVER THE LAST WEEK, did you feel stiff? Yes   If Yes, please indicate the number of hours until you are as limber as you will be for the day 1   Fatigue Score 2   Global Health Score 4   RAPID3 Score 2.94       Answers for HPI/ROS submitted by the patient on 3/19/2020   fever: No  eye redness: No  headaches: No  shortness of breath: No  chest pain: No  trouble swallowing: No  diarrhea: No  constipation: No  unexpected weight change: No  genital sore: No  dysuria: No  During the last 3 days, have you had a skin rash?: Yes  Bruises or bleeds easily: No  cough: No

## 2020-03-25 NOTE — PROGRESS NOTES
59 year old female with dermatomyositis s/p PD1 inhibitor used for breast cancer  TIF1 gamma +    Low dose steroids didn't help    Rash+ muscle weakness+dysphagia     IVIG and steroids initial treatment  mtx causes LFT derangement,anemia and leukopenia     On IVIG,now 1 g/kg bw ever 2 weeks  On cellcept 500 mg daily     Imuran not an option since she is intermediate metaboliser     On prednisone 10 mg  She has periorbital edema  She has rash on her neck and thighs  She doesn't feel great      Aldolase nml  ESR   CRP  AST 89  ALT nml  White count 2.60  ESR 70  CRP nml    She has clearly not responded to IVIG and low dose cellcept is not working  Titrating cellcept is not an option    So will go ahead and stop the cellcept  IVIG until rituximab gets approved     But will give her higher steroid dose  Go ahead with rituximab   Tb and hepatitis labs   Close follow ups  Immunogobulin checks          Answers for HPI/ROS submitted by the patient on 3/19/2020   fever: No  eye redness: No  headaches: No  shortness of breath: No  chest pain: No  trouble swallowing: No  diarrhea: No  constipation: No  unexpected weight change: No  genital sore: No  dysuria: No  During the last 3 days, have you had a skin rash?: Yes  Bruises or bleeds easily: No  cough: No

## 2020-03-26 ENCOUNTER — PATIENT MESSAGE (OUTPATIENT)
Dept: RHEUMATOLOGY | Facility: CLINIC | Age: 60
End: 2020-03-26

## 2020-03-26 ENCOUNTER — TELEPHONE (OUTPATIENT)
Dept: RHEUMATOLOGY | Facility: CLINIC | Age: 60
End: 2020-03-26

## 2020-03-26 ENCOUNTER — PATIENT MESSAGE (OUTPATIENT)
Dept: HEMATOLOGY/ONCOLOGY | Facility: CLINIC | Age: 60
End: 2020-03-26

## 2020-03-26 DIAGNOSIS — M33.13 DERMATOMYOSITIS: Primary | ICD-10-CM

## 2020-03-27 ENCOUNTER — PATIENT MESSAGE (OUTPATIENT)
Dept: HEMATOLOGY/ONCOLOGY | Facility: CLINIC | Age: 60
End: 2020-03-27

## 2020-03-27 ENCOUNTER — LAB VISIT (OUTPATIENT)
Dept: LAB | Facility: HOSPITAL | Age: 60
End: 2020-03-27
Payer: COMMERCIAL

## 2020-03-27 ENCOUNTER — TELEPHONE (OUTPATIENT)
Dept: RHEUMATOLOGY | Facility: CLINIC | Age: 60
End: 2020-03-27

## 2020-03-27 ENCOUNTER — HOSPITAL ENCOUNTER (OUTPATIENT)
Dept: RADIOLOGY | Facility: HOSPITAL | Age: 60
Discharge: HOME | End: 2020-03-27
Attending: INTERNAL MEDICINE
Payer: COMMERCIAL

## 2020-03-27 DIAGNOSIS — K75.9 HEPATITIS: Primary | ICD-10-CM

## 2020-03-27 DIAGNOSIS — C50.412 MALIGNANT NEOPLASM OF UPPER-OUTER QUADRANT OF LEFT BREAST IN FEMALE, ESTROGEN RECEPTOR NEGATIVE: ICD-10-CM

## 2020-03-27 DIAGNOSIS — Z17.1 MALIGNANT NEOPLASM OF UPPER-OUTER QUADRANT OF LEFT BREAST IN FEMALE, ESTROGEN RECEPTOR NEGATIVE: ICD-10-CM

## 2020-03-27 DIAGNOSIS — C77.9 REGIONAL LYMPH NODE METASTASIS PRESENT: ICD-10-CM

## 2020-03-27 DIAGNOSIS — M33.13 DERMATOMYOSITIS: ICD-10-CM

## 2020-03-27 LAB
HBV SURFACE AB SER-ACNC: POSITIVE M[IU]/ML
HBV SURFACE AG SERPL QL IA: NEGATIVE
HCV AB SERPL QL IA: NEGATIVE
POCT GLUCOSE: 83 MG/DL (ref 70–110)

## 2020-03-27 PROCEDURE — 87340 HEPATITIS B SURFACE AG IA: CPT

## 2020-03-27 PROCEDURE — 78815 NM PET CT ROUTINE: ICD-10-PCS | Mod: 26,PS,, | Performed by: RADIOLOGY

## 2020-03-27 PROCEDURE — A9552 F18 FDG: HCPCS

## 2020-03-27 PROCEDURE — 36415 COLL VENOUS BLD VENIPUNCTURE: CPT

## 2020-03-27 PROCEDURE — 78815 PET IMAGE W/CT SKULL-THIGH: CPT | Mod: TC

## 2020-03-27 PROCEDURE — 78815 PET IMAGE W/CT SKULL-THIGH: CPT | Mod: 26,PS,, | Performed by: RADIOLOGY

## 2020-03-27 PROCEDURE — 86803 HEPATITIS C AB TEST: CPT

## 2020-03-27 PROCEDURE — 86706 HEP B SURFACE ANTIBODY: CPT

## 2020-03-30 ENCOUNTER — PATIENT MESSAGE (OUTPATIENT)
Dept: HEMATOLOGY/ONCOLOGY | Facility: CLINIC | Age: 60
End: 2020-03-30

## 2020-03-30 ENCOUNTER — PATIENT MESSAGE (OUTPATIENT)
Dept: RHEUMATOLOGY | Facility: CLINIC | Age: 60
End: 2020-03-30

## 2020-03-30 ENCOUNTER — OFFICE VISIT (OUTPATIENT)
Dept: HEMATOLOGY/ONCOLOGY | Facility: CLINIC | Age: 60
End: 2020-03-30
Payer: COMMERCIAL

## 2020-03-30 DIAGNOSIS — C50.412 MALIGNANT NEOPLASM OF UPPER-OUTER QUADRANT OF LEFT BREAST IN FEMALE, ESTROGEN RECEPTOR NEGATIVE: Primary | Chronic | ICD-10-CM

## 2020-03-30 DIAGNOSIS — Z17.1 MALIGNANT NEOPLASM OF UPPER-OUTER QUADRANT OF LEFT BREAST IN FEMALE, ESTROGEN RECEPTOR NEGATIVE: Primary | Chronic | ICD-10-CM

## 2020-03-30 PROCEDURE — 99213 OFFICE O/P EST LOW 20 MIN: CPT | Mod: 95,,, | Performed by: INTERNAL MEDICINE

## 2020-03-30 PROCEDURE — 99213 PR OFFICE/OUTPT VISIT, EST, LEVL III, 20-29 MIN: ICD-10-PCS | Mod: 95,,, | Performed by: INTERNAL MEDICINE

## 2020-03-30 NOTE — PROGRESS NOTES
Subjective:       Patient ID: Ermelinda Verde is a 59 y.o. female.    Chief Complaint: No chief complaint on file.    HPI Mrs Verde is  a very pleasant 59-year-old -American female who returns for a diagnosis of recurrent triple negative left breast cancer.      The patient location is:  home.  The chief complaint leading to consultation is:  Breast cancer.  Visit type: Virtual visit with synchronous audio and video  Total time spent with patient:  20 minutes, including chart review, scan and lab review and documentation.  Each patient to whom he or she provides medical services by telemedicine is:  (1) informed of the relationship between the physician and patient and the respective role of any other health care provider with respect to management of the patient; and (2) notified that he or she may decline to receive medical services by telemedicine and may withdraw from such care at any time.    She is S/P 3 cycles of nab-paclitaxel and Atezolizumab (last treatment in January 2019).  That treatment resulted in complete remission.  CT scans in December 2019 were negative.    Her treatment was complicated by the development of dermatomyositis which resulted in the lengthy hospitalization from January 22nd to February 13th, 2019.  She is followed by Rheumatology and has been treated with steroids, IVIG, and low-dose methotrexate, and Cell-cept.    Her major recent problem has been a flare of her dermatomyositis associated with some swelling in worsening rash as well as worsening muscle weakness.  Her CellCept has been discontinued and she is on 20 mg of prednisone per day.  She has had some sinus congestion but no shortness of breath.  Appetite been good and her bowel function has been normal.  She does have some decrease in energy related to the flare of her myositis.          Onc History:  She developed a palpable abnormality in her left breast in January 2017 which she noted on self-examination.  A  diagnostic mammogram on January 19 showed a greater than 1 cm nodule in the upper outer portion of left breast.  By ultrasound this was lobulated and hypoechoic measuring 1.75 x 1.51 x 1.96 cm.     On January 24, 2017 a core needle biopsy was performed which showed infiltrating ductal carcinoma, high grade.  The tumor was ER negative, AZ negative, and HER-2 negative.  A follow-up ultrasound on December 6 showed 2.5 x 2.2 x 1.5 cm left breast mass.  There was no abnormality noted in the left axilla.     She underwent sentinel lymph node biopsy on February 22.  That showed 4 negative lymph nodes.     She had 4 cycles of  Wilbur-adjuvantTaxotere and Cytoxan completed  on 5/9/17.     On June 26 she underwent left mastectomy.  That revealed 2 foci of invasive high-grade carcinoma measuring 14 mm and 1.5 mm.  Margins were negative.    She completed 4 cycles of adjuvant Adriamycin on September 12, 2017.      In October 2018, she developed some left supraclavicular lymphadenopathy which turned out to be recurrence.     A fine-needle aspirate of the lymph node was performed on October 19th.  That showed metastatic carcinoma consistent with breast primary which was ER negative, AZ negative and HER 2-negative.    She then received 3 cycles of Nab paclitaxel and atezolizumab.  She last received treatment on January 3, 2019.    PET-CT in March, 2019 showed complete response.    She completed consolidation radiation therapy in May 2019.  Review of Systems   Constitutional: Negative for appetite change and unexpected weight change.   Eyes: Negative for visual disturbance.   Respiratory: Negative for cough and shortness of breath.    Cardiovascular: Negative for chest pain.   Gastrointestinal: Negative for abdominal pain and diarrhea.   Genitourinary: Negative for frequency.   Musculoskeletal: Negative for back pain.   Skin: Positive for rash.   Neurological: Negative for headaches.   Hematological: Negative for adenopathy.    Psychiatric/Behavioral: The patient is nervous/anxious.        Objective:      Physical Exam    Assessment:     PET-CT from March 27, 2020-Focal increased radiotracer uptake posterior to the right mandibular angle, with SUV max of 4.7.  There is extensive beam hardening artifact within this region from dental hardware which limits noncontrast CT evaluation.  Given limitation it is favored that this uptake represents a right level 2 cervical lymph node.  Two additional regions of increased radiotracer uptake identified within two normal sized right level 5 lymph nodes with SUV max of 3.4.  CBC shows white count of 2700 with 38% granulocytes, hemoglobin and platelet count are normal.  Metabolic profile shows creatinine 0.6, AST 84, ALT 28, bilirubin 0.5.  Hepatitis profile shows hepatitis B surface antibody positive.  1. Malignant neoplasm of upper-outer quadrant of left breast in female, estrogen receptor negative        Plan:       At this point we will need to monitor the right cervical lymph node findings.    Hopefully they do represent reactive adenopathy related to her underlying rheumatological disorder.  Will plan to see her for a physical examination in approximately 1 month.        As she is hepatitis B surface antigen and anti hepatitis B core antibody negative, she should be able to receive Rituxan therapy.

## 2020-04-02 RX ORDER — HEPARIN 100 UNIT/ML
500 SYRINGE INTRAVENOUS
Status: CANCELLED | OUTPATIENT
Start: 2020-04-02

## 2020-04-02 RX ORDER — FAMOTIDINE 10 MG/ML
20 INJECTION INTRAVENOUS
Status: CANCELLED | OUTPATIENT
Start: 2020-04-02

## 2020-04-02 RX ORDER — SODIUM CHLORIDE 0.9 % (FLUSH) 0.9 %
10 SYRINGE (ML) INJECTION
Status: CANCELLED | OUTPATIENT
Start: 2020-04-02

## 2020-04-02 RX ORDER — ACETAMINOPHEN 325 MG/1
650 TABLET ORAL
Status: CANCELLED | OUTPATIENT
Start: 2020-04-02

## 2020-04-03 ENCOUNTER — OFFICE VISIT (OUTPATIENT)
Dept: HEPATOLOGY | Facility: CLINIC | Age: 60
End: 2020-04-03
Payer: COMMERCIAL

## 2020-04-03 ENCOUNTER — PATIENT MESSAGE (OUTPATIENT)
Dept: RHEUMATOLOGY | Facility: CLINIC | Age: 60
End: 2020-04-03

## 2020-04-03 ENCOUNTER — PATIENT MESSAGE (OUTPATIENT)
Dept: HEPATOLOGY | Facility: CLINIC | Age: 60
End: 2020-04-03

## 2020-04-03 DIAGNOSIS — M33.13 DERMATOMYOSITIS: ICD-10-CM

## 2020-04-03 DIAGNOSIS — R74.01 ELEVATED AST (SGOT): Primary | ICD-10-CM

## 2020-04-03 PROCEDURE — 99213 OFFICE O/P EST LOW 20 MIN: CPT | Mod: 95,,, | Performed by: NURSE PRACTITIONER

## 2020-04-03 PROCEDURE — 99213 PR OFFICE/OUTPT VISIT, EST, LEVL III, 20-29 MIN: ICD-10-PCS | Mod: 95,,, | Performed by: NURSE PRACTITIONER

## 2020-04-03 NOTE — Clinical Note
She should be fine to start Rituxan. Her HBV core Ab was negative last year and no risks for exposure since. Hep B surface Ab now (+) from vaccination. Let me know if anything else is needed from our end.

## 2020-04-03 NOTE — PROGRESS NOTES
[facility] - Established Patient    Last Clinic Visit: 7/11/19    HPI: This is a 59 y.o. Black or  female here for follow-up for elevated liver enzymes, (+) hep B surface Ab    Video Visit  The patient location is: Home, [place]   The chief complaint leading to consultation is: F/u for elevated liver enzymes, (+) hep B surface Ab  Visit type: Virtual visit with synchronous audio and video  Total time spent with patient: 15 min  Each patient to whom he or she provides medical services by telemedicine is:  (1) informed of the relationship between the physician and patient and the respective role of any other health care provider with respect to management of the patient; and (2) notified that he or she may decline to receive medical services by telemedicine and may withdraw from such care at any time.      She was initially referred to our clinic last year for hepatic cysts, elevated liver enzymes, thrombocytopenia. Cysts simple-minimally complex, largest measuring 2.1 cm. Recommendation for repeat US in 1 year (7/2020).    Her transaminases became elevated starting 12/2018, thrombocytopenia started at this time as well. Diagnosed with breast CA 1/2017. Treatment regimen included chemotherapy, mastectomy, additional cycles of adjuvant chemo (last treatment Jan 2019). She is currently in remission.     Her treatment was complicated by the development of dermatomyositis which resulted in the lengthy hospitalization from January 22nd to February 13th, 2019.  She is followed by Rheumatology and has been treated with steroids, IVIG, MTX, and cellcept. Elevated enzymes suspected due to chemo + dermatomyositis.    (+) autoimmune markers noted: DARCY 1:160, ASMA 1:40, IgG [id number]    Fibroscan F0-1, <S1     Interval history:   Enzymes normalized but AST now trending up, 80s. ALT normal.  She has been having a dermatomyositis flare since mid-Feb. S/s include facial swelling, full body rash, muscle  weakness and fatigue, joint stiffness. CK normal last month.     Currently on prednisone 20 mg + IVIG.  Plans to start Rituxan.    She was advised to f/u with us prior to starting Rituxan.  Review of hep B labs:   Hep B surface Ab (+)  Hep B surface Ag (-)  Hep B core Ab (-)  -- 2019    Denies symptoms of hepatic decompensation including jaundice, ascites, cognitive problems to suggest hepatic encephalopathy, or GI bleeding.          Review of patient's allergies indicates:  No Known Allergies     Current Outpatient Medications on File Prior to Visit   Medication Sig Dispense Refill    amLODIPine (NORVASC) 10 MG tablet Take 1 tablet (10 mg total) by mouth once daily. 90 tablet 3    azelastine (ASTELIN) 137 mcg (0.1 %) nasal spray 1 spray (137 mcg total) by Nasal route 2 (two) times daily. 30 mL 2    calcium carbonate (OS-ESTELA) 600 mg calcium (1,500 mg) Tab Take 600 mg by mouth 2 (two) times daily with meals.      predniSONE (DELTASONE) 1 MG tablet Take 4 tablets (4 mg total) by mouth once daily. 120 tablet 0    predniSONE (DELTASONE) 10 MG tablet Take 2 tablets (20 mg total) by mouth once daily. 60 tablet 0    triamcinolone acetonide 0.025% (KENALOG) 0.025 % cream AAA on face BID x 1-2 wks then prn flares only 80 g 3    vitamin D (VITAMIN D3) 1000 units Tab Take 1,000 Units by mouth once daily.      clobetasol (TEMOVATE) 0.05 % cream Apply topically 2 (two) times daily. 45 g 1    [DISCONTINUED] mycophenolate (CELLCEPT) 500 mg Tab Take 1 tablet (500 mg total) by mouth once daily. 30 tablet 3     No current facility-administered medications on file prior to visit.          PMHX:  has a past medical history of Breast cancer (2017), Depression, Dermatomyositis, Diverticulosis, Gastritis, Hypertension, and Vitamin B12 deficiency (3/8/2018).    PSHX:  has a past surgical history that includes D&C;  section; Portacath placement; [place] lymph node biopsy (2017); Breast biopsy (Left); Myomectomy;  Colonoscopy (11/08/2011); Breast reconstruction (Left, 2017); Total Reduction Mammoplasty (Right, 2017); Insertion of tunneled central venous catheter (CVC) with subcutaneous port (Right, 10/31/2018); Mastectomy (Left, 06/26/2017); Esophagogastroduodenoscopy (N/A, 1/29/2019); Upper gastrointestinal endoscopy; Esophagogastroduodenoscopy (N/A, 7/29/2019); and Colonoscopy (N/A, 7/29/2019).    FAMILY HISTORY:   Family History   Problem Relation Age of Onset    Heart disease Father     Hypertension Father     Breast cancer Sister 52    Hypertension Mother     No Known Problems Brother     Thyroid disease Daughter         hyperthyroidism s/p thyroidectomy    No Known Problems Son     No Known Problems Brother     No Known Problems Brother     No Known Problems Sister     No Known Problems Daughter     Colon cancer Neg Hx     Ovarian cancer Neg Hx     Celiac disease Neg Hx     Cirrhosis Neg Hx     Colon polyps Neg Hx     Esophageal cancer Neg Hx     Inflammatory bowel disease Neg Hx     Liver cancer Neg Hx     Liver disease Neg Hx     Rectal cancer Neg Hx     Stomach cancer Neg Hx     Ulcerative colitis Neg Hx     Melanoma Neg Hx        SOCIAL HISTORY:   Social History     Tobacco Use   Smoking Status Never Smoker   Smokeless Tobacco Never Used       Social History     Substance and Sexual Activity   Alcohol Use Yes    Frequency: Never    Drinks per session: Patient refused    Binge frequency: Never    Comment: rare       Social History     Substance and Sexual Activity   Drug Use No         Review of Systems   Constitutional: Negative for appetite change, chills, fever and unexpected weight change. (+) fatigue.  Eyes: Negative for visual disturbance.   Respiratory: Negative for cough and shortness of breath.    Cardiovascular: Negative for chest pain, palpitations and leg swelling.   Gastrointestinal: Negative for abdominal distention, abdominal pain, blood in stool, constipation, diarrhea,  nausea and vomiting. No change in stool color.  Genitourinary: Negative for dysuria and hematuria. Denies dark urine.   Musculoskeletal: Negative for arthralgias, gait problem, joint swelling. (+) muscle weakness, joint stiffness    Skin: (+) skin reddened, rash. No wound. Denies itching. (+) facial swelling.  Neurological: Negative for dizziness, tremors, speech difficulty, and headaches.   Hematological: Does not bruise/bleed easily.   Psychiatric/Behavioral: Negative for confusion, decreased concentration and sleep disturbance. Denies memory loss. Denies depression. The patient is not nervous/anxious.        Physical Exam   Constitutional: No distress. Alert and oriented to person, place, and time.  Eyes: No scleral icterus.   Pulmonary/Chest: No evidence of respiratory distress.   Skin: Face reddened, slightly swollen. No jaundice.  Psychiatric: Normal mood and affect. Speech, behavior, and thought content normal. No depression or anxiety noted.         LABS:  Lab Results   Component Value Date    WBC 2.70 (L) 03/30/2020    HGB 12.0 03/30/2020    HCT 37.1 03/30/2020     03/30/2020     03/30/2020    K 3.6 03/30/2020    CREATININE 0.60 (L) 03/30/2020    ALT 28 03/30/2020    AST 84 (H) 03/30/2020    ALKPHOS 65 03/30/2020    BILITOT 0.5 03/30/2020    BILIDIR 0.4 (H) 03/26/2020    ALBUMIN 4.2 03/30/2020    INR 1.0 06/27/2019    SMOOTHMUSCAB Positive 1:40 (A) 06/27/2019    AMAIFA Negative 1:40 06/27/2019    IGGSERUM 3763 (H) 03/26/2020    ANASCREEN Positive (A) 06/27/2019    FERRITIN 572.0 (H) 02/12/2020    FESATURATED 22 08/27/2019    AWSAT0WHNTSN MM 06/27/2019    EOFOP0VWNLIV 129 06/27/2019    CERULOPLSM 36.0 06/27/2019    CHOL 222 (H) 02/12/2020    TRIG 103 02/12/2020    LDLCALC 108 02/12/2020    HGBA1C 5.9 02/12/2020       Hepatitis A Antibody IgG   Date Value Ref Range Status   06/27/2019 Positive (A)  Final       Hepatitis B Surface Ag   Date Value Ref Range Status   03/27/2020 Negative Negative  Final     Hep B Core Total Ab   Date Value Ref Range Status   01/28/2019 Negative  Final     Hep B S Ab   Date Value Ref Range Status   03/27/2020 Positive (A)  Final     Hepatitis C Ab   Date Value Ref Range Status   03/27/2020 Negative Negative Final     Immunization History   Administered Date(s) Administered    Influenza - Quadrivalent - Intranasal (2-49 years old) 10/24/2018    Influenza - Quadrivalent - PF (6 months and older) 10/24/2018, 08/19/2019    Pneumococcal Conjugate - 13 Valent 06/18/2019    Pneumococcal Polysaccharide - 23 Valent 01/27/2020    Tdap 03/08/2018    Zoster Recombinant 06/19/2019, 08/19/2019          DIAGNOSTIC STUDIES:  ABD. US    FINDINGS:  The liver measures 14.5 cm and demonstrates multiple simple to mildly complex cysts, which contain septations measuring 1-3 mm, similar to prior exam.  The largest within the left hepatic lobe measures up to 1.1 cm in the largest within the right hepatic lobe contains a thin septation measuring up to 2.1 cm.  No intra or extrahepatic bile duct dilatation. The common duct measures 3 mm.    The middle, right, and left hepatic veins are patent and unremarkable. The main, right, and left branches of the portal vein demonstrate hepatopedal flow and are unremarkable.    The pancreas and visualized aorta are unremarkable. The gallbladder contains a few tiny floating echogenic foci, favored represent biliary crystals.  The spleen measures 10.0 x 3.2 cm and is unremarkable.    The hepatic arterial system is unremarkable. The celiac artery is unremarkable.    The IVC, SMV, and splenic vein are patent and demonstrate proper directional flow.  Left kidney demonstrates an extra renal pelvis versus pelviectasis.  Right kidney is unremarkable.  There is no ascites.      Impression       1. Multiple simple to mildly complex hepatic cysts, similar to prior exam.  2. Gallbladder contains a few tiny biliary crystals.  3. Left kidney demonstrates an extrarenal  pelvis versus mild pelvic fullness.  4. Satisfactory Doppler evaluation of the liver vasculature.           ASSESSMENT / PLAN:  59 y.o. Black or  female with:    1. Elevated AST  -- Likely due to dermatomyositis flare, continue to trend  -- (+) autoimmune markers noted. Low suspicion for AIH at this time as she is currently on prednisone 20 mg and AST still elevated (ALT normal)   -- Fibroscan last year F0-F1, no evidence of fibrosis    2. Dermatomyositis  -- On prednisone + IVIG. Plans to start Rituxan.  -- Reviewed hep B serologies with her. (+) hep B surface Ab indicates immunity. This was negative 1/2019 but she received hep B vaccines 2/2019 and subsequent (+) titer noted 6/2019.  -- Hep B core Ab and hep B surface Ag negative; no evidence of prior exposure or current/active hep B infection. No risk for exposure over the last year.   -- No contraindication to Rituxan from hepatology standpoint, does not require HBV prophylaxis     3. Hepatic cysts  -- 1 year surveillance US as scheduled, 7/2020          No follow-up scheduled at this time.  Can return to hepatology PRN.          Thank you for allowing me to participate in the care of JULI Zepeda-MONA  Hepatology and Liver Transplant

## 2020-04-06 ENCOUNTER — TELEPHONE (OUTPATIENT)
Dept: RHEUMATOLOGY | Facility: CLINIC | Age: 60
End: 2020-04-06

## 2020-04-06 DIAGNOSIS — B37.0 ORAL THRUSH: Primary | ICD-10-CM

## 2020-04-06 RX ORDER — NYSTATIN 100000 [USP'U]/ML
4 SUSPENSION ORAL 4 TIMES DAILY
Qty: 480 ML | Refills: 0 | Status: SHIPPED | OUTPATIENT
Start: 2020-04-06 | End: 2020-05-06

## 2020-04-07 ENCOUNTER — INFUSION (OUTPATIENT)
Dept: INFUSION THERAPY | Facility: HOSPITAL | Age: 60
End: 2020-04-07
Attending: INTERNAL MEDICINE
Payer: COMMERCIAL

## 2020-04-07 VITALS
BODY MASS INDEX: 31.22 KG/M2 | RESPIRATION RATE: 18 BRPM | HEIGHT: 65 IN | TEMPERATURE: 98 F | WEIGHT: 187.38 LBS | HEART RATE: 77 BPM | OXYGEN SATURATION: 99 % | DIASTOLIC BLOOD PRESSURE: 68 MMHG | SYSTOLIC BLOOD PRESSURE: 133 MMHG

## 2020-04-07 DIAGNOSIS — M33.13 DERMATOMYOSITIS: Primary | ICD-10-CM

## 2020-04-07 DIAGNOSIS — R13.19 ESOPHAGEAL DYSPHAGIA: ICD-10-CM

## 2020-04-07 PROCEDURE — 25000003 PHARM REV CODE 250: Performed by: INTERNAL MEDICINE

## 2020-04-07 PROCEDURE — 96367 TX/PROPH/DG ADDL SEQ IV INF: CPT

## 2020-04-07 PROCEDURE — 63600175 PHARM REV CODE 636 W HCPCS: Mod: JG | Performed by: STUDENT IN AN ORGANIZED HEALTH CARE EDUCATION/TRAINING PROGRAM

## 2020-04-07 PROCEDURE — 96365 THER/PROPH/DIAG IV INF INIT: CPT

## 2020-04-07 PROCEDURE — 96375 TX/PRO/DX INJ NEW DRUG ADDON: CPT

## 2020-04-07 PROCEDURE — 63600175 PHARM REV CODE 636 W HCPCS: Performed by: INTERNAL MEDICINE

## 2020-04-07 PROCEDURE — 96366 THER/PROPH/DIAG IV INF ADDON: CPT

## 2020-04-07 PROCEDURE — S0028 INJECTION, FAMOTIDINE, 20 MG: HCPCS | Performed by: INTERNAL MEDICINE

## 2020-04-07 RX ORDER — ACETAMINOPHEN 325 MG/1
650 TABLET ORAL
Status: CANCELLED | OUTPATIENT
Start: 2020-04-21

## 2020-04-07 RX ORDER — HEPARIN 100 UNIT/ML
500 SYRINGE INTRAVENOUS
Status: DISCONTINUED | OUTPATIENT
Start: 2020-04-07 | End: 2020-04-07 | Stop reason: HOSPADM

## 2020-04-07 RX ORDER — SODIUM CHLORIDE 0.9 % (FLUSH) 0.9 %
10 SYRINGE (ML) INJECTION
Status: DISCONTINUED | OUTPATIENT
Start: 2020-04-07 | End: 2020-04-07 | Stop reason: HOSPADM

## 2020-04-07 RX ORDER — FAMOTIDINE 10 MG/ML
20 INJECTION INTRAVENOUS
Status: CANCELLED | OUTPATIENT
Start: 2020-04-21

## 2020-04-07 RX ORDER — HEPARIN 100 UNIT/ML
500 SYRINGE INTRAVENOUS
Status: CANCELLED | OUTPATIENT
Start: 2020-04-21

## 2020-04-07 RX ORDER — FAMOTIDINE 10 MG/ML
20 INJECTION INTRAVENOUS
Status: COMPLETED | OUTPATIENT
Start: 2020-04-07 | End: 2020-04-07

## 2020-04-07 RX ORDER — SODIUM CHLORIDE 0.9 % (FLUSH) 0.9 %
10 SYRINGE (ML) INJECTION
Status: CANCELLED | OUTPATIENT
Start: 2020-04-21

## 2020-04-07 RX ORDER — ACETAMINOPHEN 325 MG/1
650 TABLET ORAL
Status: COMPLETED | OUTPATIENT
Start: 2020-04-07 | End: 2020-04-07

## 2020-04-07 RX ADMIN — DIPHENHYDRAMINE HYDROCHLORIDE 50 MG: 50 INJECTION INTRAMUSCULAR; INTRAVENOUS at 10:04

## 2020-04-07 RX ADMIN — FAMOTIDINE 20 MG: 10 INJECTION INTRAVENOUS at 10:04

## 2020-04-07 RX ADMIN — HEPARIN 500 UNITS: 100 SYRINGE at 03:04

## 2020-04-07 RX ADMIN — HUMAN IMMUNOGLOBULIN G 85 G: 40 LIQUID INTRAVENOUS at 11:04

## 2020-04-07 RX ADMIN — ACETAMINOPHEN 650 MG: 325 TABLET ORAL at 10:04

## 2020-04-07 RX ADMIN — SODIUM CHLORIDE: 0.9 INJECTION, SOLUTION INTRAVENOUS at 10:04

## 2020-04-07 NOTE — PLAN OF CARE
Pt ambulatory to clinic for IVIG infusion. Only sig complaint today is redness, swelling and irritation to face, rash to trunk. No worse today. Pt start rituxan next week. Port accessed without difficulty. Good blood return. IVIG infused without difficulty. Pt tolerated well. Port flushed with NS and Heparin. De accessed with matute needle intact. Provided pt with [facility] info on Rituxan. Ambulatory from clinic in [facility].

## 2020-04-08 ENCOUNTER — OFFICE VISIT (OUTPATIENT)
Dept: RHEUMATOLOGY | Facility: CLINIC | Age: 60
End: 2020-04-08
Payer: COMMERCIAL

## 2020-04-08 DIAGNOSIS — M33.13 DERMATOMYOSITIS: ICD-10-CM

## 2020-04-08 DIAGNOSIS — M33.20 POLYMYOSITIS ASSOCIATED WITH AUTOIMMUNE DISEASE: ICD-10-CM

## 2020-04-08 DIAGNOSIS — Z74.09 IMPAIRED FUNCTIONAL MOBILITY AND ENDURANCE: ICD-10-CM

## 2020-04-08 DIAGNOSIS — Z79.899 IMMUNOSUPPRESSION DUE TO DRUG THERAPY: Primary | ICD-10-CM

## 2020-04-08 DIAGNOSIS — M35.9 POLYMYOSITIS ASSOCIATED WITH AUTOIMMUNE DISEASE: ICD-10-CM

## 2020-04-08 DIAGNOSIS — D84.821 IMMUNOSUPPRESSION DUE TO DRUG THERAPY: Primary | ICD-10-CM

## 2020-04-08 PROCEDURE — 99214 PR OFFICE/OUTPT VISIT, EST, LEVL IV, 30-39 MIN: ICD-10-PCS | Mod: 95,,, | Performed by: STUDENT IN AN ORGANIZED HEALTH CARE EDUCATION/TRAINING PROGRAM

## 2020-04-08 PROCEDURE — 99214 OFFICE O/P EST MOD 30 MIN: CPT | Mod: 95,,, | Performed by: STUDENT IN AN ORGANIZED HEALTH CARE EDUCATION/TRAINING PROGRAM

## 2020-04-08 ASSESSMENT — ROUTINE ASSESSMENT OF PATIENT INDEX DATA (RAPID3)
TOTAL RAPID3 SCORE: 1.78
WHEN YOU AWAKENED IN THE MORNING OVER THE LAST WEEK, PLEASE INDICATE THE AMOUNT OF TIME IT TAKES UNTIL YOU ARE AS LIMBER AS YOU WILL BE FOR THE DAY: 1
AM STIFFNESS SCORE: 1, YES
FATIGUE SCORE: 2
MDHAQ FUNCTION SCORE: .7
PAIN SCORE: 1.5
PATIENT GLOBAL ASSESSMENT SCORE: 1.5
PSYCHOLOGICAL DISTRESS SCORE: 3.3

## 2020-04-08 NOTE — PROGRESS NOTES
Prednisone 20 mg daily, Pre-charting done. Davida Piper MA  Answers for HPI/ROS submitted by the patient on 4/6/2020   fever: No  eye redness: No  headaches: No  shortness of breath: No  chest pain: No  trouble swallowing: No  diarrhea: No  constipation: No  unexpected weight change: No  genital sore: No  dysuria: No  During the last 3 days, have you had a skin rash?: Yes  Bruises or bleeds easily: No  cough: No

## 2020-04-08 NOTE — PROGRESS NOTES
Rapid3 Question Responses and Scores 4/6/2020   MDHAQ Score 0.7   Psychologic Score 3.3   Pain Score 1.5   When you awakened in the morning OVER THE LAST WEEK, did you feel stiff? Yes   If Yes, please indicate the number of hours until you are as limber as you will be for the day 1   Fatigue Score 2   Global Health Score 1.5   RAPID3 Score 1.77       Answers for HPI/ROS submitted by the patient on 4/6/2020   fever: No  eye redness: No  headaches: No  shortness of breath: No  chest pain: No  trouble swallowing: No  diarrhea: No  constipation: No  unexpected weight change: No  genital sore: No  dysuria: No  During the last 3 days, have you had a skin rash?: Yes  Bruises or bleeds easily: No  cough: No

## 2020-04-08 NOTE — PROGRESS NOTES
I have personally taken the history  to verify the fellow's notation, and I agree with the impression and plan stated.      Plan is to switch from IVIg to RTX to control TIFF1+/malignancy associated dermatomyositis

## 2020-04-08 NOTE — PROGRESS NOTES
The patient location is: home   The chief complaint leading to consultation is: Myositis  Visit type: Virtual visit with synchronous audio and video  Total time spent with patient: 32 minutes  Each patient to whom he or she provides medical services by telemedicine is:  (1) informed of the relationship between the physician and patient and the respective role of any other health care provider with respect to management of the patient; and (2) notified that he or she may decline to receive medical services by telemedicine and may withdraw from such care at any time.    Notes:     Last clinical visit, patient states that she did not tolerate steroid tapering.  Worsening edema and rash when she was down to prednisone 7mg.  Increased it back to 10mg just a couple days and then 20mg x 2 weeks.     Cellcept discontinued due to worsening leukopenia and other side effects.     Patient states that she is doing better since the increase of steroid.  Swelling and arthralgia had subsided.  She is still having rash (bilateral orbital, hands, and thighs)    Received IVIG yesterday - slight improvement in symptoms.  Scheduled for Rituxan April 13 and 27.     Denies any dysphagia, myalgia, SOB, urinary/bowel symptoms, alopecia, mouth ulcers, unintentional weigh loss                                     RHEUMATOLOGY CLINIC FOLLOW UP VISIT      Chief complaints:- Myositis       HPI:-  Ermelinda Javed a 59 y.o.F with history of L sided infiltrating ductal carcinoma of the L breast (dx on Jan 2017), HTN, depression, gastritis, and recently diagnosed myositis (uncertain if it's autoimmune vs medication induced), present today with concern about myositis flare    Patient was initially send from hem/onc clinic for evaluation of possible inflammatory myositis.  Patient was hospitalized from 1/22/19 till 2/13/19 for myositis.      L breast cancer s/p completion of 4 cycles of demi-adjuvant taxotere and cytoxan (completed on 5/9/17) and  mastectomy (6/26/17) and then 4 cycles of adjuvant Adriamycin (completed 9/12/17). In 10/2017 - patient developed L supraclavicular lymphadenopathy which FNA confirmed as reoccurrence of previously treated breast cancer.      Patient started on Atelizumab/Abraxane on 11/7/18. Per chart review, patient noticed rashes on the dorsum of her hands on 11/11/18. Seen in urgent care and given topical steroid cream. Then received two more infusions on Atelizumab/Abraxane on 11/21/18 and 12/5/18. Started to notice puffiness around the eyes on 12/11/18. Received another Abraxane infusion on 12/12/18 but this time with hydrocortisone 50 mg which helped reduce the swelling around the eyes. Developed swelling of the face again on 12/15/18. Next infusion of Abraxane done on 12/19/18 with Solucortef and Atelizumab held. Abraxane given again on 1/3/19 with no IV steroid. Patient developed swelling of the face on 1/4/19 and went to urgent care and given short course of prednisone 20 mg BID. On 1/15/19, patient seen in hem/onc clinic with c/o of pressure and tightness around neck. CT scan of chest and neck did not reveal any vascular compression. Started on prednisone 60 mg with taper. On 1/22/18 patient with c/o proximal muscle weakness. CPK found to be elevated around 4k. Patient admitted and given solumedrol 80 mg IV on 1/22/18. Last infusion of Atelizumab on 12/19/18. Last infusion of Abraxane on 1/3/19. Given Solumedrol 1g x 1 on 1/23/18. Seen by Dermatology on 1/24/18 and biopsy done of skin rash. Given distribution of rashes, there was concern for dermatomyositis. Patient started on Solumedrol 125 mg IV BID at this time.     During her hospitalization patient was started on high dose steroid Solumedrol 80mg IV x 1, 1g x 1, and then 125mg BID until tapered down to medrol 48mg.  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  Patient was started on IVIG (400mg/kg/daily x 5 day) 1/29/19-2/2.   Patient started on MTX 20mg  SQ (Feb 5 2019) with folic acid. MTX SQ was transitioned to PO because concern about rehab administration.  Patient failed swallow eval and PEG tube was placed.        Denies any family history of autoimmune diseases.     No smoking, EtOH, recreational drug usage.     Denies any photosensitivity, joint swelling, unintentional weigh loss, abdominal pain, night sweats, CP, SOB.  +oral thrush.     Completed rehab at [facility].  Completed IVIG (2/28 and 3/1).  Had mild HA after infusion.  Doing well.  A lot stronger - able to do household chores since discharge.  Not back at baseline yet ~80%.  Mild weakness with increased activities.  Able to ambulate without assistance (but is still a fall precaution).  Tolerating diet via PEG tube.  Completed rehab.     MTX discontinued 2/18 with concern of toxicity (decrease blood counts and transaminatis).  Folic acid increased to 4mg daily.  Still on medrol 32mg daily with atorvaquone for PCP prophylaxis.  Bactrim d/c because of interaction with leucovorin.  Leucovorin 5mg daily started - Leucovorin discontinued.  Continues to be on folic acid 4mg daily    Radiation completed (28 days) completed May 8.      EGD/colonoscopy done on July 29 - normal.  PEG tube removed    Last PET scan (June 20) showed negative for metastasis.  At this time, will monitor per oncology and repeat PET scan in Sept.  No treatment needed at this time.     At the last visit, Cellcept was increased to 500mg BID and prednisone 15mg daily.       Interval History     Rash was biopsied and evaluated by dermatology.  Biopsy report conclusive of dermatomyositis.  Was given topical steroid which provided minimal alleviation of symptoms.  +pruitius     Patient complaining of generalized swelling that started since IVIG had been changed to 1g/day every 2 weeks.  Patient also complaining of gum and tongue hypersensitivity.  Improvement of myalgia.     Denies any dysphagia, alopecia, arthralgia, abdominal pain, CP, SOB,  N/V, chills, or urinary symptoms.        Review of Systems   Constitutional: Negative for chills, diaphoresis, fever, malaise/fatigue and weight loss.   HENT: Negative for congestion, ear discharge, ear pain, hearing loss, nosebleeds, sinus pain and tinnitus.    Eyes: Negative for photophobia, pain, discharge and redness.   Respiratory: Negative for cough, hemoptysis, sputum production, shortness of breath, wheezing and stridor.    Cardiovascular: Negative for chest pain, palpitations, orthopnea, claudication, leg swelling and PND.   Gastrointestinal: Negative for abdominal pain, constipation, diarrhea, heartburn, nausea and vomiting.   Genitourinary: Negative for dysuria, frequency, hematuria and urgency.   Musculoskeletal: Positive for myalgias. Negative for back pain, joint pain and neck pain.   Skin: Positive for itching and rash.   Neurological: Positive for weakness. Negative for dizziness, tingling, tremors and headaches.   Endo/Heme/Allergies: Does not bruise/bleed easily.   Psychiatric/Behavioral: Negative for depression, hallucinations and suicidal ideas. The patient is not nervous/anxious and does not have insomnia.         Past Medical History:   Diagnosis Date    Breast cancer 2017    left    Depression     Dermatomyositis     Diverticulosis     Gastritis     Hypertension     Vitamin B12 deficiency 3/8/2018       Past Surgical History:   Procedure Laterality Date    BREAST BIOPSY Left     BREAST RECONSTRUCTION Left 2017     SECTION      COLONOSCOPY  2011    repeat in 10 yrs.    COLONOSCOPY N/A 2019    Procedure: COLONOSCOPY;  Surgeon: Pernell Rosas MD;  Location: [address];  Service: Endoscopy;  Laterality: N/A;  Patient would like to use her PEG tube for bowel prep and then have her PEG tube removed after EGD and colonoscopy procedures while she is still sedated.    D&C      ESOPHAGOGASTRODUODENOSCOPY N/A 2019    Procedure: EGD  (ESOPHAGOGASTRODUODENOSCOPY);  Surgeon: Pernell Rosas MD;  Location: [facility] (2ND FLR);  Service: Endoscopy;  Laterality: N/A;    ESOPHAGOGASTRODUODENOSCOPY N/A 7/29/2019    Procedure: EGD (ESOPHAGOGASTRODUODENOSCOPY);  Surgeon: Pernell Rosas MD;  Location: [facility] (4TH FLR);  Service: Endoscopy;  Laterality: N/A;  EGD for follow-up of erosive esophagitis per Dr. Rosas    Patient would like to use her PEG tube for bowel prep and then have her PEG tube removed after EGD and colonoscopy procedures while she is still sedated.    INSERTION OF TUNNELED CENTRAL VENOUS CATHETER (CVC) WITH SUBCUTANEOUS PORT Right 10/31/2018    Procedure: [id number];  Surgeon: Deo Palencia MD;  Location: [facility] OR 2ND FLR;  Service: General;  Laterality: Right;    MASTECTOMY Left 06/26/2017    MYOMECTOMY      PORTACATH PLACEMENT      SENTINEL LYMPH NODE BIOPSY  02/2017    left    TOTAL REDUCTION MAMMOPLASTY Right 2017    UPPER GASTROINTESTINAL ENDOSCOPY          Social History     Tobacco Use    Smoking status: Never Smoker    Smokeless tobacco: Never Used   Substance Use Topics    Alcohol use: Yes     Frequency: Never     Drinks per session: Patient refused     Binge frequency: Never     Comment: rare    Drug use: No       Family History   Problem Relation Age of Onset    Heart disease Father     Hypertension Father     Breast cancer Sister 52    Hypertension Mother     No Known Problems Brother     Thyroid disease Daughter         hyperthyroidism s/p thyroidectomy    No Known Problems Son     No Known Problems Brother     No Known Problems Brother     No Known Problems Sister     No Known Problems Daughter     Colon cancer Neg Hx     Ovarian cancer Neg Hx     Celiac disease Neg Hx     Cirrhosis Neg Hx     Colon polyps Neg Hx     Esophageal cancer Neg Hx     Inflammatory bowel disease Neg Hx     Liver cancer Neg Hx     Liver disease Neg Hx     Rectal cancer Neg Hx     Stomach cancer Neg Hx      Ulcerative colitis Neg Hx     Melanoma Neg Hx        Review of patient's allergies indicates:  No Known Allergies        Physical examination:-    There were no vitals filed for this visit.    Physical Exam   Constitutional: She is oriented to person, place, and time.   Musculoskeletal:   ROM intact    Neurological: She is alert and oriented to person, place, and time. Gait normal.   Skin:   Periorbital swelling and edema - improvement  Shawl sign              Radiographs:-  Independent visualization of images done.   CXR (1/28) - clear lungs  PET scan - no pulmonary/GI activity.       Medication List with Changes/Refills   Current Medications    AMLODIPINE (NORVASC) 10 MG TABLET    Take 1 tablet (10 mg total) by mouth once daily.    AZELASTINE (ASTELIN) 137 MCG (0.1 %) NASAL SPRAY    1 spray (137 mcg total) by Nasal route 2 (two) times daily.    CALCIUM CARBONATE (OS-ESTELA) 600 MG CALCIUM (1,500 MG) TAB    Take 600 mg by mouth 2 (two) times daily with meals.    CLOBETASOL (TEMOVATE) 0.05 % CREAM    Apply topically 2 (two) times daily.    NYSTATIN (MYCOSTATIN) 100,000 UNIT/ML SUSPENSION    Take 4 mLs (400,000 Units total) by mouth 4 (four) times daily.    PREDNISONE (DELTASONE) 1 MG TABLET    Take 4 tablets (4 mg total) by mouth once daily.    PREDNISONE (DELTASONE) 10 MG TABLET    Take 2 tablets (20 mg total) by mouth once daily.    TRIAMCINOLONE ACETONIDE 0.025% (KENALOG) 0.025 % CREAM    AAA on face BID x 1-2 wks then prn flares only    VITAMIN D (VITAMIN D3) 1000 UNITS TAB    Take 1,000 Units by mouth once daily.       Assessment/Plans:-  59 y.o.F with history of L sided infiltrating ductal carcinoma of the L breast (dx on Jan 2017), HTN, depression, gastritis, and recently diagnosed myositis (uncertain if it's autoimmune vs medication induced), present today for follow up because of concern about myositis flare.     Patient started on Atelizumab/Abraxane on 11/7/18.  Patient developed swelling of the face on  1/4/19 and went to urgent care and given short course of prednisone 20 mg BID. On 1/15/19, patient seen in hem/onc clinic with c/o of pressure and tightness around neck. CT scan of chest and neck did not reveal any vascular compression. Started on prednisone 60 mg with taper. On 1/22/18 patient with c/o proximal muscle weakness. CPK found to be elevated around 4k. Patient admitted and given solumedrol 80 mg IV on 1/22/18. Last infusion of Atelizumab on 12/19/18. Last infusion of Abraxane on 1/3/19. Given Solumedrol 1g x 1 on 1/23/18. Seen by Dermatology on 1/24/18 and biopsy done of skin rash. Given distribution of rashes, there was concern for dermatomyositis. Patient started on Solumedrol 125 mg IV BID at that time.  Skin biopsy resulted consistent with dermatomyositis.     Labs: CPK and Aldolase normalized. +DARCY 1:640 speckled with negative profile.  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 quantTB: indeterminate, T-spot: negative     Spirometry: normal, normal diffusion capacity. FVC: 81%, DLCO: 114%     Patient exhibits signs of dermatomyositis (heliotropic rash and gottron's papules).   Dermatomyositis can be associated with malignancy.  MRI of LUE showed edema of the deltoid and biceps.  Exam with proximal muscle weakness: b/l deltoids 4/5, b/l iliopsoas 4.4/5. 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   Given +TIF1 gamma positivity, most likely dermatomyositis is from underlying malignancy.      Failed 2nd swallow eval on 2/6/19. PEG placed 2/7/2019.     Current medications:  - prednisone  20mg   - IVIG Started Jan 2019 - last dose April 7  - folic acid daily 4mg    Esophageal biopsy - negative for viral infection.  Nonspecific chronic inflammation.    EGD/Colonoscopy (July 2019) - normal. PEG tube removed     Fiboscan done Aug 2019 - showed fatty liver with no scarring/damage.,     Failed Cellcept - worsening leukopenia, elevated LFTs, and other side effects without improvement of symptoms     Recent studies demonstrated increased efficacy in IVIG when spaced out (Q2w instead of Q4w)    Patient is an intermediate metabolizer based on TPMT.  Cannot start imuran.  Labs still neutropenic and thrombocytopenic at this time - uncertain of the cause (radiation induce BM fibrosis vs medication induce?)     Vaccination completed - Prevnar and Shingles (completed Aug 2019) and flu vaccination for this season (Aug 2019)     Cellcept started (Sept 2019). Patient did not tolerate cellcept increase (leukopenia), elevated LFTs, and gum sensitivity.            Plan:  - CBC, CMP, CPK, Aldolase, ESR, CRP to be done prior to next visit  - Continue prednisone to 20mg daily  - continue folic acid 4mg daily  - continue muscle strengthening exercise daily  - 1200 mg dietary calcium and Vitamin D3 1000 mg daily  - discontinue IVIG    - Start Rituxan 1000mg x 2 - therapy plan in (scheduled for April 13 and 27)  - message/call immediately if worsening muscle weakness  - Education provided on the importance of sunscreen usage       # RTC in 4 weeks   Answers for HPI/ROS submitted by the patient on 4/6/2020   trouble swallowing: No  unexpected weight change: No  genital sore: No

## 2020-04-13 ENCOUNTER — INFUSION (OUTPATIENT)
Dept: INFUSION THERAPY | Facility: HOSPITAL | Age: 60
End: 2020-04-13
Attending: INTERNAL MEDICINE
Payer: COMMERCIAL

## 2020-04-13 VITALS
WEIGHT: 185.19 LBS | SYSTOLIC BLOOD PRESSURE: 146 MMHG | HEART RATE: 95 BPM | OXYGEN SATURATION: 100 % | TEMPERATURE: 98 F | HEIGHT: 65 IN | BODY MASS INDEX: 30.85 KG/M2 | DIASTOLIC BLOOD PRESSURE: 73 MMHG | RESPIRATION RATE: 17 BRPM

## 2020-04-13 DIAGNOSIS — R13.19 ESOPHAGEAL DYSPHAGIA: ICD-10-CM

## 2020-04-13 DIAGNOSIS — M33.13 DERMATOMYOSITIS: Primary | ICD-10-CM

## 2020-04-13 PROCEDURE — 63600175 PHARM REV CODE 636 W HCPCS: Mod: JG | Performed by: INTERNAL MEDICINE

## 2020-04-13 PROCEDURE — 25000003 PHARM REV CODE 250: Performed by: INTERNAL MEDICINE

## 2020-04-13 PROCEDURE — S0028 INJECTION, FAMOTIDINE, 20 MG: HCPCS | Performed by: INTERNAL MEDICINE

## 2020-04-13 PROCEDURE — 96367 TX/PROPH/DG ADDL SEQ IV INF: CPT

## 2020-04-13 PROCEDURE — 96413 CHEMO IV INFUSION 1 HR: CPT

## 2020-04-13 PROCEDURE — 96375 TX/PRO/DX INJ NEW DRUG ADDON: CPT

## 2020-04-13 PROCEDURE — 96415 CHEMO IV INFUSION ADDL HR: CPT

## 2020-04-13 RX ORDER — HEPARIN 100 UNIT/ML
500 SYRINGE INTRAVENOUS
Status: CANCELLED | OUTPATIENT
Start: 2020-04-21

## 2020-04-13 RX ORDER — ACETAMINOPHEN 325 MG/1
650 TABLET ORAL
Status: COMPLETED | OUTPATIENT
Start: 2020-04-13 | End: 2020-04-13

## 2020-04-13 RX ORDER — SODIUM CHLORIDE 0.9 % (FLUSH) 0.9 %
10 SYRINGE (ML) INJECTION
Status: CANCELLED | OUTPATIENT
Start: 2020-04-21

## 2020-04-13 RX ORDER — HEPARIN 100 UNIT/ML
500 SYRINGE INTRAVENOUS
Status: DISCONTINUED | OUTPATIENT
Start: 2020-04-13 | End: 2020-04-13 | Stop reason: HOSPADM

## 2020-04-13 RX ORDER — FAMOTIDINE 10 MG/ML
20 INJECTION INTRAVENOUS
Status: CANCELLED | OUTPATIENT
Start: 2020-04-21

## 2020-04-13 RX ORDER — SODIUM CHLORIDE 0.9 % (FLUSH) 0.9 %
10 SYRINGE (ML) INJECTION
Status: DISCONTINUED | OUTPATIENT
Start: 2020-04-13 | End: 2020-04-13 | Stop reason: HOSPADM

## 2020-04-13 RX ORDER — ACETAMINOPHEN 325 MG/1
650 TABLET ORAL
Status: CANCELLED | OUTPATIENT
Start: 2020-04-21

## 2020-04-13 RX ORDER — FAMOTIDINE 10 MG/ML
20 INJECTION INTRAVENOUS
Status: COMPLETED | OUTPATIENT
Start: 2020-04-13 | End: 2020-04-13

## 2020-04-13 RX ADMIN — FAMOTIDINE 20 MG: 10 INJECTION INTRAVENOUS at 08:04

## 2020-04-13 RX ADMIN — RITUXIMAB 1000 MG: 10 INJECTION, SOLUTION INTRAVENOUS at 08:04

## 2020-04-13 RX ADMIN — HEPARIN 500 UNITS: 100 SYRINGE at 12:04

## 2020-04-13 RX ADMIN — ACETAMINOPHEN 650 MG: 325 TABLET ORAL at 07:04

## 2020-04-13 RX ADMIN — DEXTROSE: 50 INJECTION, SOLUTION INTRAVENOUS at 07:04

## 2020-04-13 RX ADMIN — SODIUM CHLORIDE: 0.9 INJECTION, SOLUTION INTRAVENOUS at 07:04

## 2020-04-13 RX ADMIN — DIPHENHYDRAMINE HYDROCHLORIDE 25 MG: 50 INJECTION, SOLUTION INTRAMUSCULAR; INTRAVENOUS at 08:04

## 2020-04-13 NOTE — PLAN OF CARE
Pt tolerated Rituxan cycle 1 day 1 well, with no complications or s/s of adverse reaction. VS stable throughout infusion. Pt education provided regarding Rituxan (indications, side effects, precautions) and pt verbalized understanding. Right chest port positive for blood return, flushed with normal saline and heparin and de-accessed prior to discharge. Site dressed with band-aid. RTC 4/27/20 for next Rituxan infusion, pt verbalized understanding. Pt discharged with no distress noted and ambulated indepedently out of infusion center. AVS printed and given to pt.

## 2020-04-20 ENCOUNTER — PATIENT MESSAGE (OUTPATIENT)
Dept: RHEUMATOLOGY | Facility: CLINIC | Age: 60
End: 2020-04-20

## 2020-04-24 ENCOUNTER — TELEPHONE (OUTPATIENT)
Dept: HEMATOLOGY/ONCOLOGY | Facility: CLINIC | Age: 60
End: 2020-04-24

## 2020-04-24 ENCOUNTER — CLINICAL SUPPORT (OUTPATIENT)
Dept: URGENT CARE | Facility: CLINIC | Age: 60
End: 2020-04-24
Payer: COMMERCIAL

## 2020-04-24 DIAGNOSIS — Z13.9 SCREENING FOR CONDITION: ICD-10-CM

## 2020-04-24 DIAGNOSIS — Z13.9 SCREENING FOR CONDITION: Primary | ICD-10-CM

## 2020-04-24 DIAGNOSIS — M60.9 MYOSITIS, UNSPECIFIED MYOSITIS TYPE, UNSPECIFIED SITE: Primary | ICD-10-CM

## 2020-04-24 PROCEDURE — U0002 COVID-19 LAB TEST NON-CDC: HCPCS

## 2020-04-24 NOTE — TELEPHONE ENCOUNTER
----- Message from Brando Schafer, RN sent at 4/24/2020 10:44 AM CDT -----  Regarding: covid testing  This pt is schedule for Rituxan on Monday 4/27 for rheumatololgy.    Pt does not have an order for covid test and will need to be schedule.    Thank you,  Brando Schafer BSN, RN  RN , Chemo Infusion  [facility]

## 2020-04-24 NOTE — PROGRESS NOTES
04/24/2020      In an effort to protect our immunocompromised patients from potential exposure to COVID-19, [facility] will now require all patients receiving an infusion, an injection, and/or radiation therapy to be tested for COVID-19 prior to their appointment.  All patients currently under treatment will be tested immediately, and patients initiating new treatment cycles or with one-time appointments (injections, transfusions, etc.) must be tested within 72 hours of their appointment.     Placed COVID-19 test order for patient.  A member of our team is to contact the patient in the near future to explain this process and the rationale behind it, to ask the COVID-19 screening questions, and to get the patient scheduled for their COVID-19 test.     The above was completed in accordance with instructions and guidelines set forth by [facility].     Signed,    Joshua Villagran, GEOVANNA     Date:  04/24/2020

## 2020-04-25 LAB — SARS-COV-2 RNA RESP QL NAA+PROBE: NOT DETECTED

## 2020-04-27 ENCOUNTER — INFUSION (OUTPATIENT)
Dept: INFUSION THERAPY | Facility: HOSPITAL | Age: 60
End: 2020-04-27
Attending: INTERNAL MEDICINE
Payer: COMMERCIAL

## 2020-04-27 VITALS
WEIGHT: 183.88 LBS | SYSTOLIC BLOOD PRESSURE: 113 MMHG | HEIGHT: 65 IN | HEART RATE: 70 BPM | TEMPERATURE: 99 F | RESPIRATION RATE: 18 BRPM | DIASTOLIC BLOOD PRESSURE: 70 MMHG | BODY MASS INDEX: 30.64 KG/M2

## 2020-04-27 DIAGNOSIS — M33.13 DERMATOMYOSITIS: Primary | ICD-10-CM

## 2020-04-27 PROCEDURE — 25000003 PHARM REV CODE 250: Performed by: INTERNAL MEDICINE

## 2020-04-27 PROCEDURE — 96415 CHEMO IV INFUSION ADDL HR: CPT

## 2020-04-27 PROCEDURE — 96413 CHEMO IV INFUSION 1 HR: CPT

## 2020-04-27 PROCEDURE — 96375 TX/PRO/DX INJ NEW DRUG ADDON: CPT

## 2020-04-27 PROCEDURE — 63600175 PHARM REV CODE 636 W HCPCS: Performed by: INTERNAL MEDICINE

## 2020-04-27 PROCEDURE — S0028 INJECTION, FAMOTIDINE, 20 MG: HCPCS | Performed by: INTERNAL MEDICINE

## 2020-04-27 PROCEDURE — 96367 TX/PROPH/DG ADDL SEQ IV INF: CPT

## 2020-04-27 RX ORDER — ACETAMINOPHEN 325 MG/1
650 TABLET ORAL
Status: CANCELLED | OUTPATIENT
Start: 2020-05-04

## 2020-04-27 RX ORDER — HEPARIN 100 UNIT/ML
500 SYRINGE INTRAVENOUS
Status: CANCELLED | OUTPATIENT
Start: 2020-05-04

## 2020-04-27 RX ORDER — FAMOTIDINE 10 MG/ML
20 INJECTION INTRAVENOUS
Status: COMPLETED | OUTPATIENT
Start: 2020-04-27 | End: 2020-04-27

## 2020-04-27 RX ORDER — HEPARIN 100 UNIT/ML
500 SYRINGE INTRAVENOUS
Status: DISCONTINUED | OUTPATIENT
Start: 2020-04-27 | End: 2020-04-27 | Stop reason: HOSPADM

## 2020-04-27 RX ORDER — FAMOTIDINE 10 MG/ML
20 INJECTION INTRAVENOUS
Status: CANCELLED | OUTPATIENT
Start: 2020-05-04

## 2020-04-27 RX ORDER — ACETAMINOPHEN 325 MG/1
650 TABLET ORAL
Status: COMPLETED | OUTPATIENT
Start: 2020-04-27 | End: 2020-04-27

## 2020-04-27 RX ORDER — SODIUM CHLORIDE 0.9 % (FLUSH) 0.9 %
10 SYRINGE (ML) INJECTION
Status: CANCELLED | OUTPATIENT
Start: 2020-05-04

## 2020-04-27 RX ADMIN — DIPHENHYDRAMINE HYDROCHLORIDE 25 MG: 50 INJECTION, SOLUTION INTRAMUSCULAR; INTRAVENOUS at 09:04

## 2020-04-27 RX ADMIN — ACETAMINOPHEN 650 MG: 325 TABLET ORAL at 08:04

## 2020-04-27 RX ADMIN — RITUXIMAB 1000 MG: 10 INJECTION, SOLUTION INTRAVENOUS at 09:04

## 2020-04-27 RX ADMIN — HEPARIN 500 UNITS: 100 SYRINGE at 01:04

## 2020-04-27 RX ADMIN — FAMOTIDINE 20 MG: 10 INJECTION INTRAVENOUS at 08:04

## 2020-04-27 RX ADMIN — DEXTROSE: 50 INJECTION, SOLUTION INTRAVENOUS at 09:04

## 2020-04-27 NOTE — PROGRESS NOTES
To:  Staff of Dr. Reyna:    This patient will also need to be phoned with her COVID-19 test results, as she may be expecting a call whether positive or negative.  Please phone this patient to let her know that her COVID-19 test came back negative and that, based on that result, she can proceed with her appointment(s) as indicated by her treatment provider(s).  If my assistance is needed, don't hesitate to reach out.      Thanks,  Joshua Villagran, DNP, APRN, FNP-BC, AOCNP  [facility], 3rd Floor  [facility] [address], [place], [place]  [id number] [phone]

## 2020-04-27 NOTE — PROGRESS NOTES
The patient was notified of this result via message, and this result and accompanying note were forwarded to the patient's hematology/oncology team.

## 2020-04-27 NOTE — PLAN OF CARE
Pt tolerated Rituxan with no complications. VSS. Pt instructed to call MD with any problems. NAD. Pt discharged home independently.

## 2020-04-28 ENCOUNTER — TELEPHONE (OUTPATIENT)
Dept: RHEUMATOLOGY | Facility: CLINIC | Age: 60
End: 2020-04-28

## 2020-04-29 ENCOUNTER — OFFICE VISIT (OUTPATIENT)
Dept: RHEUMATOLOGY | Facility: CLINIC | Age: 60
End: 2020-04-29
Payer: COMMERCIAL

## 2020-04-29 DIAGNOSIS — Z79.899 IMMUNOSUPPRESSION DUE TO DRUG THERAPY: ICD-10-CM

## 2020-04-29 DIAGNOSIS — M33.20 POLYMYOSITIS ASSOCIATED WITH AUTOIMMUNE DISEASE: ICD-10-CM

## 2020-04-29 DIAGNOSIS — M60.9 MYOSITIS, UNSPECIFIED MYOSITIS TYPE, UNSPECIFIED SITE: Primary | ICD-10-CM

## 2020-04-29 DIAGNOSIS — M35.9 POLYMYOSITIS ASSOCIATED WITH AUTOIMMUNE DISEASE: ICD-10-CM

## 2020-04-29 DIAGNOSIS — C50.412 MALIGNANT NEOPLASM OF UPPER-OUTER QUADRANT OF LEFT BREAST IN FEMALE, ESTROGEN RECEPTOR NEGATIVE: ICD-10-CM

## 2020-04-29 DIAGNOSIS — D84.821 IMMUNOSUPPRESSION DUE TO DRUG THERAPY: ICD-10-CM

## 2020-04-29 DIAGNOSIS — Z79.899 ENCOUNTER FOR LONG-TERM CURRENT USE OF HIGH RISK MEDICATION: ICD-10-CM

## 2020-04-29 DIAGNOSIS — Z17.1 MALIGNANT NEOPLASM OF UPPER-OUTER QUADRANT OF LEFT BREAST IN FEMALE, ESTROGEN RECEPTOR NEGATIVE: ICD-10-CM

## 2020-04-29 PROCEDURE — 99214 OFFICE O/P EST MOD 30 MIN: CPT | Mod: 95,,, | Performed by: STUDENT IN AN ORGANIZED HEALTH CARE EDUCATION/TRAINING PROGRAM

## 2020-04-29 PROCEDURE — 99214 PR OFFICE/OUTPT VISIT, EST, LEVL IV, 30-39 MIN: ICD-10-PCS | Mod: 95,,, | Performed by: STUDENT IN AN ORGANIZED HEALTH CARE EDUCATION/TRAINING PROGRAM

## 2020-04-29 RX ORDER — PREDNISONE 5 MG/1
15 TABLET ORAL DAILY
Qty: 90 TABLET | Refills: 0 | Status: SHIPPED | OUTPATIENT
Start: 2020-04-29 | End: 2020-05-29

## 2020-04-29 ASSESSMENT — ROUTINE ASSESSMENT OF PATIENT INDEX DATA (RAPID3)
PAIN SCORE: 1
AM STIFFNESS SCORE: 1, YES
MDHAQ FUNCTION SCORE: .5
PSYCHOLOGICAL DISTRESS SCORE: 3.3
WHEN YOU AWAKENED IN THE MORNING OVER THE LAST WEEK, PLEASE INDICATE THE AMOUNT OF TIME IT TAKES UNTIL YOU ARE AS LIMBER AS YOU WILL BE FOR THE DAY: 1HR
TOTAL RAPID3 SCORE: 1.22
FATIGUE SCORE: 1
PATIENT GLOBAL ASSESSMENT SCORE: 1

## 2020-04-29 NOTE — PROGRESS NOTES
Rapid3 Question Responses and Scores 4/28/2020   MDHAQ Score 0.5   Psychologic Score 3.3   Pain Score 1   When you awakened in the morning OVER THE LAST WEEK, did you feel stiff? Yes   If Yes, please indicate the number of hours until you are as limber as you will be for the day 1   Fatigue Score 1   Global Health Score 1   RAPID3 Score 1.22

## 2020-04-29 NOTE — PROGRESS NOTES
The patient location is: home   The chief complaint leading to consultation is: Myositis  Visit type: Virtual visit with synchronous audio and video  Total time spent with patient: 32 minutes  Each patient to whom he or she provides medical services by telemedicine is:  (1) informed of the relationship between the physician and patient and the respective role of any other health care provider with respect to management of the patient; and (2) notified that he or she may decline to receive medical services by telemedicine and may withdraw from such care at any time.    Notes:   Patient completed Rituxan 1000mg x 2 (last dose was April 27).  Patient tolerated the infusion without any complications.  She feels significant better.  Swelling subsided and rash resolving.  Patient feels weak (not myalgia) - more deconditioned.  Denies any dysphagia, arthralgia, new rash, abdominal pain, N/V, fever/chills, urinary symptoms.     Last IVIG was early April 2020.  Currently on 20mg of prednisone.       Last clinical visit, patient states that she did not tolerate steroid tapering.  Worsening edema and rash when she was down to prednisone 7mg.  Increased it back to 10mg just a couple days and then 20mg x 2 weeks.     Cellcept discontinued due to worsening leukopenia and other side effects.     Patient states that she is doing better since the increase of steroid.  Swelling and arthralgia had subsided.  She is still having rash (bilateral orbital, hands, and thighs)    Received IVIG yesterday - slight improvement in symptoms.  Scheduled for Rituxan April 13 and 27.     Denies any dysphagia, myalgia, SOB, urinary/bowel symptoms, alopecia, mouth ulcers, unintentional weigh loss                                     RHEUMATOLOGY CLINIC FOLLOW UP VISIT      Chief complaints:- Myositis       HPI:-  Ermelinda Javed a 59 y.o.F with history of L sided infiltrating ductal carcinoma of the L breast (dx on Jan 2017), HTN, depression,  gastritis, and recently diagnosed myositis (uncertain if it's autoimmune vs medication induced), present today with concern about myositis flare    Patient was initially send from hem/onc clinic for evaluation of possible inflammatory myositis.  Patient was hospitalized from 1/22/19 till 2/13/19 for myositis.      L breast cancer s/p completion of 4 cycles of demi-adjuvant taxotere and cytoxan (completed on 5/9/17) and mastectomy (6/26/17) and then 4 cycles of adjuvant Adriamycin (completed 9/12/17). In 10/2017 - patient developed L supraclavicular lymphadenopathy which FNA confirmed as reoccurrence of previously treated breast cancer.      Patient started on Atelizumab/Abraxane on 11/7/18. Per chart review, patient noticed rashes on the dorsum of her hands on 11/11/18. Seen in urgent care and given topical steroid cream. Then received two more infusions on Atelizumab/Abraxane on 11/21/18 and 12/5/18. Started to notice puffiness around the eyes on 12/11/18. Received another Abraxane infusion on 12/12/18 but this time with hydrocortisone 50 mg which helped reduce the swelling around the eyes. Developed swelling of the face again on 12/15/18. Next infusion of Abraxane done on 12/19/18 with Solucortef and Atelizumab held. Abraxane given again on 1/3/19 with no IV steroid. Patient developed swelling of the face on 1/4/19 and went to urgent care and given short course of prednisone 20 mg BID. On 1/15/19, patient seen in hem/onc clinic with c/o of pressure and tightness around neck. CT scan of chest and neck did not reveal any vascular compression. Started on prednisone 60 mg with taper. On 1/22/18 patient with c/o proximal muscle weakness. CPK found to be elevated around 4k. Patient admitted and given solumedrol 80 mg IV on 1/22/18. Last infusion of Atelizumab on 12/19/18. Last infusion of Abraxane on 1/3/19. Given Solumedrol 1g x 1 on 1/23/18. Seen by Dermatology on 1/24/18 and biopsy done of skin rash. Given  distribution of rashes, there was concern for dermatomyositis. Patient started on Solumedrol 125 mg IV BID at this time.     During her hospitalization patient was started on high dose steroid Solumedrol 80mg IV x 1, 1g x 1, and then 125mg BID until tapered down to medrol 48mg.  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  Patient was started on IVIG (400mg/kg/daily x 5 day) 1/29/19-2/2.   Patient started on MTX 20mg SQ (Feb 5 2019) with folic acid. MTX SQ was transitioned to PO because concern about rehab administration.  Patient failed swallow eval and PEG tube was placed.        Denies any family history of autoimmune diseases.     No smoking, EtOH, recreational drug usage.     Denies any photosensitivity, joint swelling, unintentional weigh loss, abdominal pain, night sweats, CP, SOB.  +oral thrush.     Completed rehab at [facility].  Completed IVIG (2/28 and 3/1).  Had mild HA after infusion.  Doing well.  A lot stronger - able to do household chores since discharge.  Not back at baseline yet ~80%.  Mild weakness with increased activities.  Able to ambulate without assistance (but is still a fall precaution).  Tolerating diet via PEG tube.  Completed rehab.     MTX discontinued 2/18 with concern of toxicity (decrease blood counts and transaminatis).  Folic acid increased to 4mg daily.  Still on medrol 32mg daily with atorvaquone for PCP prophylaxis.  Bactrim d/c because of interaction with leucovorin.  Leucovorin 5mg daily started - Leucovorin discontinued.  Continues to be on folic acid 4mg daily    Radiation completed (28 days) completed May 8.      EGD/colonoscopy done on July 29 - normal.  PEG tube removed    Last PET scan (June 20) showed negative for metastasis.  At this time, will monitor per oncology and repeat PET scan in Sept.  No treatment needed at this time.     At the last visit, Cellcept was increased to 500mg BID and prednisone 15mg daily.       Interval History     Rash was biopsied  and evaluated by dermatology.  Biopsy report conclusive of dermatomyositis.  Was given topical steroid which provided minimal alleviation of symptoms.  +pruitius     Patient complaining of generalized swelling that started since IVIG had been changed to 1g/day every 2 weeks.  Patient also complaining of gum and tongue hypersensitivity.  Improvement of myalgia.     Denies any dysphagia, alopecia, arthralgia, abdominal pain, CP, SOB, N/V, chills, or urinary symptoms.        Review of Systems   Constitutional: Negative for chills, diaphoresis, fever, malaise/fatigue and weight loss.   HENT: Negative for congestion, ear discharge, ear pain, hearing loss, nosebleeds, sinus pain and tinnitus.    Eyes: Negative for photophobia, pain, discharge and redness.   Respiratory: Negative for cough, hemoptysis, sputum production, shortness of breath, wheezing and stridor.    Cardiovascular: Negative for chest pain, palpitations, orthopnea, claudication, leg swelling and PND.   Gastrointestinal: Negative for abdominal pain, constipation, diarrhea, heartburn, nausea and vomiting.   Genitourinary: Negative for dysuria, frequency, hematuria and urgency.   Musculoskeletal: Positive for myalgias. Negative for back pain, joint pain and neck pain.   Skin: Positive for itching and rash.   Neurological: Positive for weakness. Negative for dizziness, tingling, tremors and headaches.   Endo/Heme/Allergies: Does not bruise/bleed easily.   Psychiatric/Behavioral: Negative for depression, hallucinations and suicidal ideas. The patient is not nervous/anxious and does not have insomnia.         Past Medical History:   Diagnosis Date    Breast cancer 2017    left    Depression     Dermatomyositis     Diverticulosis     Gastritis     Hypertension     Vitamin B12 deficiency 3/8/2018       Past Surgical History:   Procedure Laterality Date    BREAST BIOPSY Left     BREAST RECONSTRUCTION Left 2017     SECTION      COLONOSCOPY   11/08/2011    repeat in 10 yrs.    COLONOSCOPY N/A 7/29/2019    Procedure: COLONOSCOPY;  Surgeon: Pernell Rosas MD;  Location: [facility] (4TH FLR);  Service: Endoscopy;  Laterality: N/A;  Patient would like to use her PEG tube for bowel prep and then have her PEG tube removed after EGD and colonoscopy procedures while she is still sedated.    D&C      ESOPHAGOGASTRODUODENOSCOPY N/A 1/29/2019    Procedure: EGD (ESOPHAGOGASTRODUODENOSCOPY);  Surgeon: Pernell Rosas MD;  Location: [facility] (2ND FLR);  Service: Endoscopy;  Laterality: N/A;    ESOPHAGOGASTRODUODENOSCOPY N/A 7/29/2019    Procedure: EGD (ESOPHAGOGASTRODUODENOSCOPY);  Surgeon: Pernell Rosas MD;  Location: [facility] (4TH FLR);  Service: Endoscopy;  Laterality: N/A;  EGD for follow-up of erosive esophagitis per Dr. Rosas    Patient would like to use her PEG tube for bowel prep and then have her PEG tube removed after EGD and colonoscopy procedures while she is still sedated.    INSERTION OF TUNNELED CENTRAL VENOUS CATHETER (CVC) WITH SUBCUTANEOUS PORT Right 10/31/2018    Procedure: [id number];  Surgeon: Deo Palencia MD;  [address];  Service: General;  Laterality: Right;    MASTECTOMY Left 06/26/2017    MYOMECTOMY      PORTACATH PLACEMENT      SENTINEL LYMPH NODE BIOPSY  02/2017    left    TOTAL REDUCTION MAMMOPLASTY Right 2017    UPPER GASTROINTESTINAL ENDOSCOPY          Social History     Tobacco Use    Smoking status: Never Smoker    Smokeless tobacco: Never Used   Substance Use Topics    Alcohol use: Yes     Frequency: Never     Drinks per session: Patient refused     Binge frequency: Never     Comment: rare    Drug use: No       Family History   Problem Relation Age of Onset    Heart disease Father     Hypertension Father     Breast cancer Sister 52    Hypertension Mother     No Known Problems Brother     Thyroid disease Daughter         hyperthyroidism s/p thyroidectomy    No Known Problems Son     No Known  Problems Brother     No Known Problems Brother     No Known Problems Sister     No Known Problems Daughter     Colon cancer Neg Hx     Ovarian cancer Neg Hx     Celiac disease Neg Hx     Cirrhosis Neg Hx     Colon polyps Neg Hx     Esophageal cancer Neg Hx     Inflammatory bowel disease Neg Hx     Liver cancer Neg Hx     Liver disease Neg Hx     Rectal cancer Neg Hx     Stomach cancer Neg Hx     Ulcerative colitis Neg Hx     Melanoma Neg Hx        Review of patient's allergies indicates:  No Known Allergies        Physical examination:-    There were no vitals filed for this visit.    Physical Exam   Constitutional: She is oriented to person, place, and time.   Musculoskeletal:   ROM intact    Neurological: She is alert and oriented to person, place, and time. Gait normal.   Skin:   Periorbital swelling and edema - improvement  Shawl sign              Radiographs:-  Independent visualization of images done.   CXR (1/28) - clear lungs  PET scan - no pulmonary/GI activity.       Medication List with Changes/Refills   Current Medications    AMLODIPINE (NORVASC) 10 MG TABLET    Take 1 tablet (10 mg total) by mouth once daily.    AZELASTINE (ASTELIN) 137 MCG (0.1 %) NASAL SPRAY    1 spray (137 mcg total) by Nasal route 2 (two) times daily.    CALCIUM CARBONATE (OS-ESTELA) 600 MG CALCIUM (1,500 MG) TAB    Take 600 mg by mouth 2 (two) times daily with meals.    CLOBETASOL (TEMOVATE) 0.05 % CREAM    Apply topically 2 (two) times daily.    NYSTATIN (MYCOSTATIN) 100,000 UNIT/ML SUSPENSION    Take 4 mLs (400,000 Units total) by mouth 4 (four) times daily.    TRIAMCINOLONE ACETONIDE 0.025% (KENALOG) 0.025 % CREAM    AAA on face BID x 1-2 wks then prn flares only    VITAMIN D (VITAMIN D3) 1000 UNITS TAB    Take 1,000 Units by mouth once daily.       Assessment/Plans:-  59 y.o.F with history of L sided infiltrating ductal carcinoma of the L breast (dx on Jan 2017), HTN, depression, gastritis, and recently diagnosed  myositis (uncertain if it's autoimmune vs medication induced), present today for follow up because of concern about myositis flare.     Patient started on Atelizumab/Abraxane on 11/7/18.  Patient developed swelling of the face on 1/4/19 and went to urgent care and given short course of prednisone 20 mg BID. On 1/15/19, patient seen in hem/onc clinic with c/o of pressure and tightness around neck. CT scan of chest and neck did not reveal any vascular compression. Started on prednisone 60 mg with taper. On 1/22/18 patient with c/o proximal muscle weakness. CPK found to be elevated around 4k. Patient admitted and given solumedrol 80 mg IV on 1/22/18. Last infusion of Atelizumab on 12/19/18. Last infusion of Abraxane on 1/3/19. Given Solumedrol 1g x 1 on 1/23/18. Seen by Dermatology on 1/24/18 and biopsy done of skin rash. Given distribution of rashes, there was concern for dermatomyositis. Patient started on Solumedrol 125 mg IV BID at that time.  Skin biopsy resulted consistent with dermatomyositis.     Labs: CPK and Aldolase normalized. +DARCY 1:640 speckled with negative profile.  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 quantTB: indeterminate, T-spot: negative     Spirometry: normal, normal diffusion capacity. FVC: 81%, DLCO: 114%     Patient exhibits signs of dermatomyositis (heliotropic rash and gottron's papules).   Dermatomyositis can be associated with malignancy.  MRI of LUE showed edema of the deltoid and biceps.  Exam with proximal muscle weakness: b/l deltoids 4/5, b/l iliopsoas 4.4/5. 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      Patient either has Dermatomyositis induced by checkpoint inhibitor treatment (Atelizumab which is anti-PDL1) or has Dermatomyositis  related to her underlying breast cancer.   Given +TIF1 gamma positivity, most likely dermatomyositis is from underlying malignancy.      Failed 2nd swallow eval on 2/6/19. PEG placed 2/7/2019.     Current medications:  - prednisone 20mg   - IVIG Started Jan 2019 - last dose April 7  - Rituxan 1000mg x 2 (last dose April 27)    Esophageal biopsy - negative for viral infection.  Nonspecific chronic inflammation.    EGD/Colonoscopy (July 2019) - normal. PEG tube removed     Fiboscan done Aug 2019 - showed fatty liver with no scarring/damage.,     Failed Cellcept - worsening leukopenia, elevated LFTs, and other side effects without improvement of symptoms     Recent studies demonstrated increased efficacy in IVIG when spaced out (Q2w instead of Q4w)    Patient is an intermediate metabolizer based on TPMT.  Cannot start imuran.  Labs still neutropenic and thrombocytopenic at this time - uncertain of the cause (radiation induce BM fibrosis vs medication induce?)     Vaccination completed - Prevnar and Shingles (completed Aug 2019) and flu vaccination for this season (Aug 2019)     Dermatomyositis - currently on Rituxan and Prednisone 20mg daily.  Tolerating well and improvement of symptoms.  Plan is taper steroid and continue Rituxan 1000mg x 2 every 6 months.     Plan:  - CBC, CMP, CPK, Aldolase, ESR, CRP to be done in 4 weeks and then prior to next visit  - Taper steroid (currently on prednisone 20mg) - will decrease 5mg every 2 weeks.  Hold at 10mg until next lab.   - continue muscle strengthening exercise daily  - 1200 mg dietary calcium and Vitamin D3 1000 mg daily  - discontinue IVIG    - Next Rituxan dose due after Oct 27.  Will need Hep panel, Immunoglobulin level, and rituxan sensitivity prior   - message/call immediately if worsening muscle weakness  - Education provided on the importance of sunscreen usage       # RTC in 6 weeks     Answers for HPI/ROS submitted by the patient on 4/6/2020   trouble swallowing:  No  unexpected weight change: No  genital sore: No

## 2020-04-29 NOTE — PROGRESS NOTES
59 year old female with dermatomyositis s/p PD1 inhibitor used for breast cancer  TIF1 gamma +    Low dose steroids didn't help    Rash+ muscle weakness+dysphagia     IVIG and steroids initial treatment  mtx causes LFT derangement,anemia and leukopenia     Last time she was on  IVIG, 1 g/kg bw ever 2 weeks  She was on cellcept 500 mg daily   Imuran not an option since she is intermediate metaboliser   She was on prednisone 10 mg    She had significant periorbital edema,rash on the neck and thighs    We didn't think she was responding to the treatment     So we gave her rituximab 1000 mg x 2    She is also   On prednisone 20 mg    She has done well  Rash and weakness and the swelling which she kept reporting got better too    Labs     Ck,aldolase nml  AST still high 85  ALT nml  GFR nml  CBC nml  ESR down to 56  CRP nml    Looks like we are noticing response to rituximab    She will continue this dose every 6 months 1000 mg x 2 as maintanence regimen too    Labs in a month : revisit CMP abnormalities and check inf markers      Answers for HPI/ROS submitted by the patient on 4/28/2020   fever: No  eye redness: No  headaches: No  shortness of breath: No  chest pain: No  trouble swallowing: No  diarrhea: No  constipation: No  unexpected weight change: No  genital sore: No  dysuria: No  During the last 3 days, have you had a skin rash?: Yes  Bruises or bleeds easily: No  cough: No

## 2020-05-06 NOTE — PROGRESS NOTES
Subjective:       Patient ID: Ermelinda Verde is a 59 y.o. female.    Chief Complaint: No chief complaint on file.    HPI Mrs Verde is  a very pleasant 59-year-old -American female who returns for a diagnosis of recurrent triple negative left breast cancer.      She is S/P 3 cycles of nab-paclitaxel and Atezolizumab (last treatment in January 2019).  That treatment resulted in complete remission.   PET-CT from March 27, 2020-Focal increased radiotracer uptake posterior to the right mandibular angle, with SUV max of 4.7,  it is favored that this uptake represents a right level 2 cervical lymph node.  Two additional regions of increased radiotracer uptake identified within two normal sized right level 5 lymph nodes with SUV max of 3.4     Her treatment has been complicated by the development of dermatomyositis which resulted in the lengthy hospitalization from January 22nd to February 13th, 2019.  She is followed by Rheumatology and has been treated with steroids, IVIG, and low-dose methotrexate, and Cell-cept.  She received rituximab therapy 4/13/2- and 4/27/20.  Labs on April 22nd showed normal CK and aldolase, CBC and metabolic profile showed no significant abnormalities other than an AST of 85.      Feels as though her symptoms have been a bit better since the Rituxan treatment although she continues to have a very pruritic skin rash.  Muscle stiffness is improved.  She is swallowing well.  She has no shortness of breath or fever.  Bowel function has been normal.       Onc History:  She developed a palpable abnormality in her left breast in January 2017 which she noted on self-examination.  A diagnostic mammogram on January 19 showed a greater than 1 cm nodule in the upper outer portion of left breast.  By ultrasound this was lobulated and hypoechoic measuring 1.75 x 1.51 x 1.96 cm.     On January 24, 2017 a core needle biopsy was performed which showed infiltrating ductal carcinoma, high grade.  The  tumor was ER negative, WY negative, and HER-2 negative.  A follow-up ultrasound on December 6 showed 2.5 x 2.2 x 1.5 cm left breast mass.  There was no abnormality noted in the left axilla.     She underwent sentinel lymph node biopsy on February 22.  That showed 4 negative lymph nodes.     She had 4 cycles of  Wilbur-adjuvantTaxotere and Cytoxan completed  on 5/9/17.     On June 26 she underwent left mastectomy.  That revealed 2 foci of invasive high-grade carcinoma measuring 14 mm and 1.5 mm.  Margins were negative.    She completed 4 cycles of adjuvant Adriamycin on September 12, 2017.      In October 2018, she developed some left supraclavicular lymphadenopathy which turned out to be recurrence.     A fine-needle aspirate of the lymph node was performed on October 19th.  That showed metastatic carcinoma consistent with breast primary which was ER negative, WY negative and HER 2-negative.    She then received 3 cycles of Nab paclitaxel and atezolizumab.  She last received treatment on January 3, 2019.    PET-CT in March, 2019 showed complete response.    She then had consolidative radiation therapy in May 2019.  Review of Systems   Constitutional: Negative for appetite change and unexpected weight change.   Eyes: Negative for visual disturbance.   Respiratory: Negative for cough and shortness of breath.    Cardiovascular: Negative for chest pain.   Gastrointestinal: Negative for abdominal pain and diarrhea.   Genitourinary: Negative for frequency.   Musculoskeletal: Negative for back pain.        Stiffness   Skin: Positive for rash.   Neurological: Positive for weakness. Negative for headaches.   Hematological: Negative for adenopathy.   Psychiatric/Behavioral: The patient is nervous/anxious.        Objective:      Physical Exam   Constitutional: She is oriented to person, place, and time. She appears well-developed and well-nourished. No distress.   Eyes: No scleral icterus.   Cardiovascular: Normal rate and  regular rhythm.   Pulmonary/Chest: Effort normal and breath sounds normal. She has no wheezes. She has no rales. Right breast exhibits no mass, no nipple discharge and no skin change.       Abdominal: Soft. She exhibits no mass. There is no tenderness.   Lymphadenopathy:     She has no cervical adenopathy.   Neurological: She is alert and oriented to person, place, and time.   Skin: Rash noted. There is erythema.   Psychiatric: She has a normal mood and affect. Her behavior is normal. Thought content normal.   Vitals reviewed.      Assessment:       1. Malignant neoplasm of upper-outer quadrant of left breast in female, estrogen receptor negative    2. Polymyositis associated with autoimmune disease        Plan:       Return in 2 months with follow-up PET scan.

## 2020-05-06 NOTE — PROGRESS NOTES
Answers for HPI/ROS submitted by the patient on 5/4/2020   appetite change : No  unexpected weight change: No  visual disturbance: No  cough: No  shortness of breath: No  chest pain: No  abdominal pain: No  diarrhea: No  frequency: No  back pain: No  rash: Yes  headaches: No  adenopathy: No  nervous/ anxious: Yes

## 2020-05-07 ENCOUNTER — OFFICE VISIT (OUTPATIENT)
Dept: HEMATOLOGY/ONCOLOGY | Facility: CLINIC | Age: 60
End: 2020-05-07
Payer: COMMERCIAL

## 2020-05-07 VITALS
DIASTOLIC BLOOD PRESSURE: 69 MMHG | SYSTOLIC BLOOD PRESSURE: 133 MMHG | HEIGHT: 65 IN | OXYGEN SATURATION: 100 % | HEART RATE: 104 BPM | WEIGHT: 185.44 LBS | RESPIRATION RATE: 16 BRPM | BODY MASS INDEX: 30.89 KG/M2 | TEMPERATURE: 98 F

## 2020-05-07 DIAGNOSIS — M33.20 POLYMYOSITIS ASSOCIATED WITH AUTOIMMUNE DISEASE: ICD-10-CM

## 2020-05-07 DIAGNOSIS — M35.9 POLYMYOSITIS ASSOCIATED WITH AUTOIMMUNE DISEASE: ICD-10-CM

## 2020-05-07 DIAGNOSIS — Z13.9 SCREENING FOR CONDITION: Primary | ICD-10-CM

## 2020-05-07 DIAGNOSIS — C50.412 MALIGNANT NEOPLASM OF UPPER-OUTER QUADRANT OF LEFT BREAST IN FEMALE, ESTROGEN RECEPTOR NEGATIVE: Primary | Chronic | ICD-10-CM

## 2020-05-07 DIAGNOSIS — Z17.1 MALIGNANT NEOPLASM OF UPPER-OUTER QUADRANT OF LEFT BREAST IN FEMALE, ESTROGEN RECEPTOR NEGATIVE: Primary | Chronic | ICD-10-CM

## 2020-05-07 DIAGNOSIS — Z17.1 MALIGNANT NEOPLASM OF UPPER-OUTER QUADRANT OF LEFT BREAST IN FEMALE, ESTROGEN RECEPTOR NEGATIVE: Primary | ICD-10-CM

## 2020-05-07 DIAGNOSIS — C50.412 MALIGNANT NEOPLASM OF UPPER-OUTER QUADRANT OF LEFT BREAST IN FEMALE, ESTROGEN RECEPTOR NEGATIVE: Primary | ICD-10-CM

## 2020-05-07 DIAGNOSIS — Z13.9 SCREENING FOR CONDITION: ICD-10-CM

## 2020-05-07 DIAGNOSIS — C50.412 MALIGNANT NEOPLASM OF UPPER-OUTER QUADRANT OF LEFT BREAST IN FEMALE, ESTROGEN RECEPTOR NEGATIVE: ICD-10-CM

## 2020-05-07 DIAGNOSIS — Z17.1 MALIGNANT NEOPLASM OF UPPER-OUTER QUADRANT OF LEFT BREAST IN FEMALE, ESTROGEN RECEPTOR NEGATIVE: ICD-10-CM

## 2020-05-07 PROCEDURE — 99999 PR PBB SHADOW E&M-EST. PATIENT-LVL III: ICD-10-PCS | Mod: PBBFAC,,, | Performed by: INTERNAL MEDICINE

## 2020-05-07 PROCEDURE — 99213 OFFICE O/P EST LOW 20 MIN: CPT | Mod: S$GLB,,, | Performed by: INTERNAL MEDICINE

## 2020-05-07 PROCEDURE — 3008F BODY MASS INDEX DOCD: CPT | Mod: CPTII,S$GLB,, | Performed by: INTERNAL MEDICINE

## 2020-05-07 PROCEDURE — 3075F PR MOST RECENT SYSTOLIC BLOOD PRESS GE 130-139MM HG: ICD-10-PCS | Mod: CPTII,S$GLB,, | Performed by: INTERNAL MEDICINE

## 2020-05-07 PROCEDURE — 3008F PR BODY MASS INDEX (BMI) DOCUMENTED: ICD-10-PCS | Mod: CPTII,S$GLB,, | Performed by: INTERNAL MEDICINE

## 2020-05-07 PROCEDURE — 3078F PR MOST RECENT DIASTOLIC BLOOD PRESSURE < 80 MM HG: ICD-10-PCS | Mod: CPTII,S$GLB,, | Performed by: INTERNAL MEDICINE

## 2020-05-07 PROCEDURE — 3078F DIAST BP <80 MM HG: CPT | Mod: CPTII,S$GLB,, | Performed by: INTERNAL MEDICINE

## 2020-05-07 PROCEDURE — 3075F SYST BP GE 130 - 139MM HG: CPT | Mod: CPTII,S$GLB,, | Performed by: INTERNAL MEDICINE

## 2020-05-07 PROCEDURE — 99213 PR OFFICE/OUTPT VISIT, EST, LEVL III, 20-29 MIN: ICD-10-PCS | Mod: S$GLB,,, | Performed by: INTERNAL MEDICINE

## 2020-05-07 PROCEDURE — 99999 PR PBB SHADOW E&M-EST. PATIENT-LVL III: CPT | Mod: PBBFAC,,, | Performed by: INTERNAL MEDICINE

## 2020-05-18 ENCOUNTER — PATIENT MESSAGE (OUTPATIENT)
Dept: RHEUMATOLOGY | Facility: CLINIC | Age: 60
End: 2020-05-18

## 2020-05-18 ENCOUNTER — TELEPHONE (OUTPATIENT)
Dept: RHEUMATOLOGY | Facility: CLINIC | Age: 60
End: 2020-05-18

## 2020-05-18 NOTE — TELEPHONE ENCOUNTER
I spoke with Ms. Verde. She reports weakness, swelling of her face, and legs, and recurrence of her heliotrope rash (she sent a picture) since decreasing her prednisone dose to 10mg two days ago. We will increase her prednisone dose to 30mg x 1 week, then have her hold at 20mg and reach out to Dr. Swift and Dr. Madera to update them at that time.       Patient discussed with Dr. Blaze Sandoval M.D.  Rheumatology, PGY 4

## 2020-05-20 ENCOUNTER — TELEPHONE (OUTPATIENT)
Dept: INTERNAL MEDICINE | Facility: CLINIC | Age: 60
End: 2020-05-20

## 2020-05-20 NOTE — TELEPHONE ENCOUNTER
----- Message from Richard Hackett sent at 5/20/2020  8:19 AM CDT -----  Contact: self   Patient state she have an appointment on 06/02 and asking if there is any blood work she need to have done. Please call and advise.

## 2020-05-22 ENCOUNTER — PATIENT MESSAGE (OUTPATIENT)
Dept: INTERNAL MEDICINE | Facility: CLINIC | Age: 60
End: 2020-05-22

## 2020-05-22 ENCOUNTER — PATIENT MESSAGE (OUTPATIENT)
Dept: RHEUMATOLOGY | Facility: CLINIC | Age: 60
End: 2020-05-22

## 2020-05-26 ENCOUNTER — TELEPHONE (OUTPATIENT)
Dept: RHEUMATOLOGY | Facility: CLINIC | Age: 60
End: 2020-05-26

## 2020-05-26 ENCOUNTER — PATIENT MESSAGE (OUTPATIENT)
Dept: RHEUMATOLOGY | Facility: CLINIC | Age: 60
End: 2020-05-26

## 2020-05-26 DIAGNOSIS — M33.20 POLYMYOSITIS ASSOCIATED WITH AUTOIMMUNE DISEASE: ICD-10-CM

## 2020-05-26 DIAGNOSIS — M35.9 POLYMYOSITIS ASSOCIATED WITH AUTOIMMUNE DISEASE: ICD-10-CM

## 2020-05-26 DIAGNOSIS — D84.821 IMMUNOSUPPRESSION DUE TO DRUG THERAPY: ICD-10-CM

## 2020-05-26 DIAGNOSIS — M33.13 DERMATOMYOSITIS: ICD-10-CM

## 2020-05-26 DIAGNOSIS — Z79.899 LONG-TERM USE OF PLAQUENIL: ICD-10-CM

## 2020-05-26 DIAGNOSIS — Z79.899 IMMUNOSUPPRESSION DUE TO DRUG THERAPY: ICD-10-CM

## 2020-05-26 DIAGNOSIS — Z79.899 ENCOUNTER FOR LONG-TERM CURRENT USE OF HIGH RISK MEDICATION: ICD-10-CM

## 2020-05-26 DIAGNOSIS — M60.9 MYOSITIS, UNSPECIFIED MYOSITIS TYPE, UNSPECIFIED SITE: Primary | ICD-10-CM

## 2020-05-26 RX ORDER — HYDROXYCHLOROQUINE SULFATE 200 MG/1
200 TABLET, FILM COATED ORAL 2 TIMES DAILY
Qty: 60 TABLET | Refills: 5 | Status: SHIPPED | OUTPATIENT
Start: 2020-05-26 | End: 2020-06-17 | Stop reason: SDUPTHER

## 2020-05-26 NOTE — TELEPHONE ENCOUNTER
Case discussed with Dr. FUNEZ and Dr. Thakur.      Recommendation for patient to start on HCQ given that patient recently received Rituxan (April 27).  Mixed results about skin manifestation of dermatomyositis.  Informed patient about this.  Currently patient is on 30mg of prednisone and symptoms resolved.  If she develops rash again - she is to discontinue HCQ immediately and notify us.    Recommendation patient to get baseline eye exam - ophth referral in.    Patient will decrease prednisone to 25mg x 2 weeks and then stay on 20mg until follow up.     If failed HCQ and/or fail steroid tapering - other consideration includes Xeljanz and Tacrolimus

## 2020-06-02 ENCOUNTER — OFFICE VISIT (OUTPATIENT)
Dept: INTERNAL MEDICINE | Facility: CLINIC | Age: 60
End: 2020-06-02
Payer: COMMERCIAL

## 2020-06-02 VITALS
HEIGHT: 65 IN | HEART RATE: 97 BPM | BODY MASS INDEX: 30.34 KG/M2 | OXYGEN SATURATION: 99 % | SYSTOLIC BLOOD PRESSURE: 126 MMHG | WEIGHT: 182.13 LBS | DIASTOLIC BLOOD PRESSURE: 70 MMHG

## 2020-06-02 DIAGNOSIS — Z79.52 CURRENT CHRONIC USE OF SYSTEMIC STEROIDS: ICD-10-CM

## 2020-06-02 DIAGNOSIS — R73.03 PREDIABETES: ICD-10-CM

## 2020-06-02 DIAGNOSIS — I10 BENIGN ESSENTIAL HYPERTENSION: Primary | ICD-10-CM

## 2020-06-02 DIAGNOSIS — M33.13 DERMATOMYOSITIS: ICD-10-CM

## 2020-06-02 PROCEDURE — 3008F PR BODY MASS INDEX (BMI) DOCUMENTED: ICD-10-PCS | Mod: CPTII,S$GLB,, | Performed by: INTERNAL MEDICINE

## 2020-06-02 PROCEDURE — 99999 PR PBB SHADOW E&M-EST. PATIENT-LVL III: ICD-10-PCS | Mod: PBBFAC,,, | Performed by: INTERNAL MEDICINE

## 2020-06-02 PROCEDURE — 99214 PR OFFICE/OUTPT VISIT, EST, LEVL IV, 30-39 MIN: ICD-10-PCS | Mod: S$GLB,,, | Performed by: INTERNAL MEDICINE

## 2020-06-02 PROCEDURE — 99214 OFFICE O/P EST MOD 30 MIN: CPT | Mod: S$GLB,,, | Performed by: INTERNAL MEDICINE

## 2020-06-02 PROCEDURE — 3008F BODY MASS INDEX DOCD: CPT | Mod: CPTII,S$GLB,, | Performed by: INTERNAL MEDICINE

## 2020-06-02 PROCEDURE — 99999 PR PBB SHADOW E&M-EST. PATIENT-LVL III: CPT | Mod: PBBFAC,,, | Performed by: INTERNAL MEDICINE

## 2020-06-02 PROCEDURE — 3074F SYST BP LT 130 MM HG: CPT | Mod: CPTII,S$GLB,, | Performed by: INTERNAL MEDICINE

## 2020-06-02 PROCEDURE — 3078F DIAST BP <80 MM HG: CPT | Mod: CPTII,S$GLB,, | Performed by: INTERNAL MEDICINE

## 2020-06-02 PROCEDURE — 3078F PR MOST RECENT DIASTOLIC BLOOD PRESSURE < 80 MM HG: ICD-10-PCS | Mod: CPTII,S$GLB,, | Performed by: INTERNAL MEDICINE

## 2020-06-02 PROCEDURE — 3074F PR MOST RECENT SYSTOLIC BLOOD PRESSURE < 130 MM HG: ICD-10-PCS | Mod: CPTII,S$GLB,, | Performed by: INTERNAL MEDICINE

## 2020-06-02 RX ORDER — PREDNISONE 20 MG/1
TABLET ORAL
COMMUNITY
Start: 2020-05-24 | End: 2020-10-06

## 2020-06-02 NOTE — PROGRESS NOTES
"Subjective:       Patient ID: Ermelinda Verde is a 59 y.o. female.    Chief Complaint: Follow-up    HPI   Undergoing a dermatomyositis flareup a mo ago.   Increased prednisone up to 30mg. This helped w/ the swelling. Current dose on 20mg daily. Has f/u appt w/ Dr. Swift in a few weeks.   Reports no reflux/abdominal pain. If she eats something w/ a little bit of spice, her mouth would feel like it's burning.   On plaquenil 200mg BID. S/p rituxan - last infusion in April.     Recurrent breast CA s/p chemo.   Follows w/ Dr. Reyna. Repeat PET in July. Reviewed PET/CT from March.     Liver cyst and elevated AST. Saw hepatologist.  Scheduled for US of liver in July.     a1c 5.9 2/12/20.     HTN well controlled on amlodipine 10mg daily.    Has been working on her strength w/ 5lbs weights. Doing much better.     Review of Systems   Constitutional: Negative for activity change and unexpected weight change.   HENT: Negative for hearing loss, rhinorrhea and trouble swallowing.    Eyes: Negative for discharge and visual disturbance.   Respiratory: Negative for chest tightness and wheezing.    Cardiovascular: Negative for chest pain and palpitations.   Gastrointestinal: Positive for constipation. Negative for blood in stool, diarrhea and vomiting.   Endocrine: Negative for polydipsia and polyuria.   Genitourinary: Negative for difficulty urinating, dysuria, hematuria and menstrual problem.   Musculoskeletal: Negative for arthralgias, joint swelling and neck pain.   Neurological: Positive for weakness. Negative for headaches.   Psychiatric/Behavioral: Negative for confusion and dysphoric mood.         Objective:      Physical Exam    /70 (BP Location: Right arm, Patient Position: Sitting, BP Method: Large (Manual))   Pulse 97   Ht 5' 5" (1.651 m)   Wt 82.6 kg (182 lb 1.6 oz)   LMP  (LMP Unknown)   SpO2 99%   BMI 30.30 kg/m²     Gen - A+OX4, NAD  HEENT - PERRL, OP clear. MMM. Patch of erythema under the L tongue. "   Neck - no LAD but does have fullness at the L supraclavicular area.  Chest - taut skin w/ hyperpigmentation of the L upper chest. CTAB, no wheezing/rhonchi/crackles  CV - RRR, no m/r  Abd - S/NT/ND/+BS  Ext -2 + B radial pulses. No LE edema. Normal gait.   MSK - 5/5 BUE and BLE m strength. As above.     Previous labs reviewed.     Assessment/Plan     Ermelinda was seen today for follow-up.    Diagnoses and all orders for this visit:    Benign essential hypertension - Stable and controlled. Continue current medications.    Dermatomyositis - currently on prednisone 20 till appt w/ rheum. On plaquenil 200mg BID.     Current chronic use of systemic steroids  -     Hemoglobin A1C; Future  -     Hemoglobin A1C    Prediabetes  -     Hemoglobin A1C; Future  -     Hemoglobin A1C      Follow up in about 4 months (around 10/2/2020).      Reta Weeks MD  [facility] of [facility] [place] [place]  10:51 AM

## 2020-06-17 ENCOUNTER — LAB VISIT (OUTPATIENT)
Dept: LAB | Facility: HOSPITAL | Age: 60
End: 2020-06-17
Attending: STUDENT IN AN ORGANIZED HEALTH CARE EDUCATION/TRAINING PROGRAM
Payer: COMMERCIAL

## 2020-06-17 ENCOUNTER — OFFICE VISIT (OUTPATIENT)
Dept: RHEUMATOLOGY | Facility: CLINIC | Age: 60
End: 2020-06-17
Payer: COMMERCIAL

## 2020-06-17 ENCOUNTER — PATIENT OUTREACH (OUTPATIENT)
Dept: ADMINISTRATIVE | Facility: OTHER | Age: 60
End: 2020-06-17

## 2020-06-17 ENCOUNTER — TELEPHONE (OUTPATIENT)
Dept: NEUROLOGY | Facility: CLINIC | Age: 60
End: 2020-06-17

## 2020-06-17 VITALS
TEMPERATURE: 98 F | BODY MASS INDEX: 31.07 KG/M2 | DIASTOLIC BLOOD PRESSURE: 77 MMHG | HEART RATE: 104 BPM | SYSTOLIC BLOOD PRESSURE: 140 MMHG | WEIGHT: 186.5 LBS | HEIGHT: 65 IN

## 2020-06-17 DIAGNOSIS — Z79.899 IMMUNOSUPPRESSION DUE TO DRUG THERAPY: ICD-10-CM

## 2020-06-17 DIAGNOSIS — Z17.1 MALIGNANT NEOPLASM OF UPPER-OUTER QUADRANT OF LEFT BREAST IN FEMALE, ESTROGEN RECEPTOR NEGATIVE: ICD-10-CM

## 2020-06-17 DIAGNOSIS — R21 RASH: ICD-10-CM

## 2020-06-17 DIAGNOSIS — M35.9 POLYMYOSITIS ASSOCIATED WITH AUTOIMMUNE DISEASE: ICD-10-CM

## 2020-06-17 DIAGNOSIS — M33.13 DERMATOMYOSITIS: ICD-10-CM

## 2020-06-17 DIAGNOSIS — C50.412 MALIGNANT NEOPLASM OF UPPER-OUTER QUADRANT OF LEFT BREAST IN FEMALE, ESTROGEN RECEPTOR NEGATIVE: ICD-10-CM

## 2020-06-17 DIAGNOSIS — C50.919 MALIGNANT NEOPLASM OF FEMALE BREAST, UNSPECIFIED ESTROGEN RECEPTOR STATUS, UNSPECIFIED LATERALITY, UNSPECIFIED SITE OF BREAST: ICD-10-CM

## 2020-06-17 DIAGNOSIS — M79.10 MYALGIA: ICD-10-CM

## 2020-06-17 DIAGNOSIS — M60.9 MYOSITIS, UNSPECIFIED MYOSITIS TYPE, UNSPECIFIED SITE: ICD-10-CM

## 2020-06-17 DIAGNOSIS — G62.0 CHEMOTHERAPY-INDUCED NEUROPATHY: Primary | ICD-10-CM

## 2020-06-17 DIAGNOSIS — D84.821 IMMUNOSUPPRESSION DUE TO DRUG THERAPY: ICD-10-CM

## 2020-06-17 DIAGNOSIS — T45.1X5A CHEMOTHERAPY-INDUCED NEUROPATHY: Primary | ICD-10-CM

## 2020-06-17 DIAGNOSIS — Z79.899 LONG-TERM USE OF PLAQUENIL: ICD-10-CM

## 2020-06-17 DIAGNOSIS — Z79.899 ENCOUNTER FOR LONG-TERM CURRENT USE OF HIGH RISK MEDICATION: ICD-10-CM

## 2020-06-17 DIAGNOSIS — M33.20 POLYMYOSITIS ASSOCIATED WITH AUTOIMMUNE DISEASE: ICD-10-CM

## 2020-06-17 PROCEDURE — 3008F BODY MASS INDEX DOCD: CPT | Mod: CPTII,S$GLB,, | Performed by: STUDENT IN AN ORGANIZED HEALTH CARE EDUCATION/TRAINING PROGRAM

## 2020-06-17 PROCEDURE — 36415 COLL VENOUS BLD VENIPUNCTURE: CPT

## 2020-06-17 PROCEDURE — 3077F SYST BP >= 140 MM HG: CPT | Mod: CPTII,S$GLB,, | Performed by: STUDENT IN AN ORGANIZED HEALTH CARE EDUCATION/TRAINING PROGRAM

## 2020-06-17 PROCEDURE — 99999 PR PBB SHADOW E&M-EST. PATIENT-LVL IV: CPT | Mod: PBBFAC,,, | Performed by: STUDENT IN AN ORGANIZED HEALTH CARE EDUCATION/TRAINING PROGRAM

## 2020-06-17 PROCEDURE — 3008F PR BODY MASS INDEX (BMI) DOCUMENTED: ICD-10-PCS | Mod: CPTII,S$GLB,, | Performed by: STUDENT IN AN ORGANIZED HEALTH CARE EDUCATION/TRAINING PROGRAM

## 2020-06-17 PROCEDURE — 3078F PR MOST RECENT DIASTOLIC BLOOD PRESSURE < 80 MM HG: ICD-10-PCS | Mod: CPTII,S$GLB,, | Performed by: STUDENT IN AN ORGANIZED HEALTH CARE EDUCATION/TRAINING PROGRAM

## 2020-06-17 PROCEDURE — 99214 OFFICE O/P EST MOD 30 MIN: CPT | Mod: S$GLB,,, | Performed by: STUDENT IN AN ORGANIZED HEALTH CARE EDUCATION/TRAINING PROGRAM

## 2020-06-17 PROCEDURE — 83519 RIA NONANTIBODY: CPT

## 2020-06-17 PROCEDURE — 99999 PR PBB SHADOW E&M-EST. PATIENT-LVL IV: ICD-10-PCS | Mod: PBBFAC,,, | Performed by: STUDENT IN AN ORGANIZED HEALTH CARE EDUCATION/TRAINING PROGRAM

## 2020-06-17 PROCEDURE — 99214 PR OFFICE/OUTPT VISIT, EST, LEVL IV, 30-39 MIN: ICD-10-PCS | Mod: S$GLB,,, | Performed by: STUDENT IN AN ORGANIZED HEALTH CARE EDUCATION/TRAINING PROGRAM

## 2020-06-17 PROCEDURE — 3077F PR MOST RECENT SYSTOLIC BLOOD PRESSURE >= 140 MM HG: ICD-10-PCS | Mod: CPTII,S$GLB,, | Performed by: STUDENT IN AN ORGANIZED HEALTH CARE EDUCATION/TRAINING PROGRAM

## 2020-06-17 PROCEDURE — 3078F DIAST BP <80 MM HG: CPT | Mod: CPTII,S$GLB,, | Performed by: STUDENT IN AN ORGANIZED HEALTH CARE EDUCATION/TRAINING PROGRAM

## 2020-06-17 PROCEDURE — 83520 IMMUNOASSAY QUANT NOS NONAB: CPT

## 2020-06-17 RX ORDER — CLOBETASOL PROPIONATE 0.5 MG/G
CREAM TOPICAL 2 TIMES DAILY
Qty: 60 G | Refills: 2 | Status: SHIPPED | OUTPATIENT
Start: 2020-06-17 | End: 2021-09-30

## 2020-06-17 RX ORDER — HYDROXYCHLOROQUINE SULFATE 200 MG/1
200 TABLET, FILM COATED ORAL 2 TIMES DAILY
Qty: 60 TABLET | Refills: 5 | Status: SHIPPED | OUTPATIENT
Start: 2020-06-17 | End: 2020-07-17

## 2020-06-17 NOTE — PROGRESS NOTES
RHEUMATOLOGY CLINIC FOLLOW UP VISIT  Chief complaints:-  To follow up for Myositis follow up     HPI:-  Ermelinda Javed a 59 y.o. pleasant female  with history of L sided infiltrating ductal carcinoma of the L breast (dx on Jan 2017), HTN, depression, gastritis, and recently diagnosed myositis (uncertain if it's autoimmune vs medication induced), present today with concern about myositis flare     Patient was initially send from hem/onc clinic for evaluation of possible inflammatory myositis.  Patient was hospitalized from 1/22/19 till 2/13/19 for myositis.      L breast cancer s/p completion of 4 cycles of demi-adjuvant taxotere and cytoxan (completed on 5/9/17) and mastectomy (6/26/17) and then 4 cycles of adjuvant Adriamycin (completed 9/12/17). In 10/2017 - patient developed L supraclavicular lymphadenopathy which FNA confirmed as reoccurrence of previously treated breast cancer.      Patient started on Atelizumab/Abraxane on 11/7/18. Per chart review, patient noticed rashes on the dorsum of her hands on 11/11/18. Seen in urgent care and given topical steroid cream. Then received two more infusions on Atelizumab/Abraxane on 11/21/18 and 12/5/18. Started to notice puffiness around the eyes on 12/11/18. Received another Abraxane infusion on 12/12/18 but this time with hydrocortisone 50 mg which helped reduce the swelling around the eyes. Developed swelling of the face again on 12/15/18. Next infusion of Abraxane done on 12/19/18 with Solucortef and Atelizumab held. Abraxane given again on 1/3/19 with no IV steroid. Patient developed swelling of the face on 1/4/19 and went to urgent care and given short course of prednisone 20 mg BID. On 1/15/19, patient seen in hem/onc clinic with c/o of pressure and tightness around neck. CT scan of chest and neck did not reveal any vascular compression. Started on prednisone 60 mg with taper. On 1/22/18 patient with c/o  proximal muscle weakness. CPK found to be elevated around 4k. Patient admitted and given solumedrol 80 mg IV on 1/22/18. Last infusion of Atelizumab on 12/19/18. Last infusion of Abraxane on 1/3/19. Given Solumedrol 1g x 1 on 1/23/18. Seen by Dermatology on 1/24/18 and biopsy done of skin rash. Given distribution of rashes, there was concern for dermatomyositis. Patient started on Solumedrol 125 mg IV BID at this time.      During her hospitalization patient was started on high dose steroid Solumedrol 80mg IV x 1, 1g x 1, and then 125mg BID until tapered down to medrol 48mg.  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  Patient was started on IVIG (400mg/kg/daily x 5 day) 1/29/19-2/2.   Patient started on MTX 20mg SQ (Feb 5 2019) with folic acid. MTX SQ was transitioned to PO because concern about rehab administration.  Patient failed swallow eval and PEG tube was placed.        Denies any family history of autoimmune diseases.     No smoking, EtOH, recreational drug usage.     Denies any photosensitivity, joint swelling, unintentional weigh loss, abdominal pain, night sweats, CP, SOB.  +oral thrush.      Completed rehab at [facility].  Completed IVIG (2/28 and 3/1).  Had mild HA after infusion.  Doing well.  A lot stronger - able to do household chores since discharge.  Not back at baseline yet ~80%.  Mild weakness with increased activities.  Able to ambulate without assistance (but is still a fall precaution).  Tolerating diet via PEG tube.  Completed rehab.      MTX discontinued 2/18 with concern of toxicity (decrease blood counts and transaminatis).  Folic acid increased to 4mg daily.  Still on medrol 32mg daily with atorvaquone for PCP prophylaxis.  Bactrim d/c because of interaction with leucovorin.  Leucovorin 5mg daily started - Leucovorin discontinued.  Continues to be on folic acid 4mg daily     Radiation completed (28 days) completed May 8.       EGD/colonoscopy done on July 29 - normal.  PEG  tube removed     Last PET scan (June 20) showed negative for metastasis.  At this time, will monitor per oncology and repeat PET scan in Sept.  No treatment needed at this time.      Rash was biopsied and evaluated by dermatology.  Biopsy report conclusive of dermatomyositis.  Was given topical steroid which provided minimal alleviation of symptoms.  +pruitius      Interval history  Completed Rituxan on 4/27 (1g x 2).  Had a flare after infusion requiring increase of steroid.  Since then patient had been doing well - improving of myalgia and rash.  Still having mild edema and generalized weakness (especially in the afternoon/evening).       Patient was started on HCQ - compliant on all home medications.  Continues to work out daily.  Finds most difficulty with getting in and out of the car.   Denies any dysphagia, alopecia, arthralgia, abdominal pain, CP, SOB, N/V, chills, or urinary symptoms.      Review of Systems   Constitutional: Negative for chills, diaphoresis, fever, malaise/fatigue and weight loss.   HENT: Negative for congestion, ear discharge, ear pain, hearing loss, nosebleeds, sinus pain and tinnitus.    Eyes: Positive for discharge. Negative for photophobia, pain and redness.   Respiratory: Negative for cough, hemoptysis, sputum production, shortness of breath, wheezing and stridor.    Cardiovascular: Negative for chest pain, palpitations, orthopnea, claudication, leg swelling and PND.   Gastrointestinal: Positive for constipation. Negative for abdominal pain, diarrhea, heartburn, nausea and vomiting.   Genitourinary: Negative for dysuria, frequency, hematuria and urgency.   Musculoskeletal: Positive for myalgias. Negative for back pain, joint pain and neck pain.   Skin: Positive for rash.   Neurological: Negative for dizziness, tingling, tremors, weakness and headaches.   Endo/Heme/Allergies: Does not bruise/bleed easily.   Psychiatric/Behavioral: Negative for depression, hallucinations and suicidal  ideas. The patient is not nervous/anxious and does not have insomnia.        Past Medical History:   Diagnosis Date    Breast cancer 2017    left    Depression     Dermatomyositis     Diverticulosis     Gastritis     Hypertension     Vitamin B12 deficiency 3/8/2018       Past Surgical History:   Procedure Laterality Date    BREAST BIOPSY Left     BREAST RECONSTRUCTION Left 2017     SECTION      COLONOSCOPY  2011    repeat in 10 yrs.    COLONOSCOPY N/A 2019    Procedure: COLONOSCOPY;  Surgeon: Pernell Rosas MD;  Location: [facility] (4TH FLR);  Service: Endoscopy;  Laterality: N/A;  Patient would like to use her PEG tube for bowel prep and then have her PEG tube removed after EGD and colonoscopy procedures while she is still sedated.    D&C      ESOPHAGOGASTRODUODENOSCOPY N/A 2019    Procedure: EGD (ESOPHAGOGASTRODUODENOSCOPY);  Surgeon: Pernell Rosas MD;  Location: [facility] (2ND FLR);  Service: Endoscopy;  Laterality: N/A;    ESOPHAGOGASTRODUODENOSCOPY N/A 2019    Procedure: EGD (ESOPHAGOGASTRODUODENOSCOPY);  Surgeon: Pernell Rosas MD;  Location: [facility] (4TH FLR);  Service: Endoscopy;  Laterality: N/A;  EGD for follow-up of erosive esophagitis per Dr. Rosas    Patient would like to use her PEG tube for bowel prep and then have her PEG tube removed after EGD and colonoscopy procedures while she is still sedated.    INSERTION OF TUNNELED CENTRAL VENOUS CATHETER (CVC) WITH SUBCUTANEOUS PORT Right 10/31/2018    Procedure: [id number];  Surgeon: Deo Palencia MD;  Location: [address];  Service: General;  Laterality: Right;    MASTECTOMY Left 2017    MYOMECTOMY      PORTACATH PLACEMENT      SENTINEL LYMPH NODE BIOPSY  2017    left    TOTAL REDUCTION MAMMOPLASTY Right 2017    UPPER GASTROINTESTINAL ENDOSCOPY          Social History     Tobacco Use    Smoking status: Never Smoker    Smokeless tobacco: Never Used   Substance Use Topics     Alcohol use: Yes     Frequency: Never     Drinks per session: Patient refused     Binge frequency: Never     Comment: rare    Drug use: No       Family History   Problem Relation Age of Onset    Heart disease Father     Hypertension Father     Breast cancer Sister 52    Hypertension Mother     No Known Problems Brother     Thyroid disease Daughter         hyperthyroidism s/p thyroidectomy    No Known Problems Son     No Known Problems Brother     No Known Problems Brother     No Known Problems Sister     No Known Problems Daughter     Colon cancer Neg Hx     Ovarian cancer Neg Hx     Celiac disease Neg Hx     Cirrhosis Neg Hx     Colon polyps Neg Hx     Esophageal cancer Neg Hx     Inflammatory bowel disease Neg Hx     Liver cancer Neg Hx     Liver disease Neg Hx     Rectal cancer Neg Hx     Stomach cancer Neg Hx     Ulcerative colitis Neg Hx     Melanoma Neg Hx        Review of patient's allergies indicates:  No Known Allergies    There were no vitals filed for this visit.    Physical Exam   Constitutional: She is oriented to person, place, and time and well-developed, well-nourished, and in no distress.   HENT:   Head: Normocephalic and atraumatic.   Eyes: Pupils are equal, round, and reactive to light. EOM are normal.   Neck: Normal range of motion. Neck supple.   Cardiovascular: Normal rate, regular rhythm and normal heart sounds.   Pulmonary/Chest: Effort normal and breath sounds normal.   Abdominal: Soft. Bowel sounds are normal.   Musculoskeletal: Normal range of motion.   Neurological: She is alert and oriented to person, place, and time. Gait normal. GCS score is 15.   Skin: Skin is warm and dry. No rash noted. No erythema.   guttron's papules on bilateral hands   heliotropic rash      Psychiatric: Mood, memory, affect and judgment normal.       Labs:  Results for ROMEO PEREZ (MRN [id number]) as of 6/17/2020 10:12   Ref. Range 4/22/2020 11:51 4/24/2020 11:43 5/25/2020 09:34  6/10/2020 11:25 6/10/2020 11:26   WBC Latest Ref Range: 3.90 - 12.70 K/uL 4.30  3.60 (L) 4.60    RBC Latest Ref Range: 4.00 - 5.40 M/uL 4.49  4.52 4.47    Hemoglobin Latest Ref Range: 12.0 - 16.0 g/dL 12.1  12.5 12.4    Hematocrit Latest Ref Range: 37.0 - 48.5 % 38.2  38.7 38.4    MCV Latest Ref Range: 82 - 98 fL 85  86 86    MCH Latest Ref Range: 27.0 - 31.0 pg 27.0  27.7 27.8    MCHC Latest Ref Range: 32.0 - 36.0 g/dL 31.8 (L)  32.4 32.4    RDW Latest Ref Range: 11.5 - 14.5 % 16.0 (H)  17.2 (H) 17.3 (H)    Platelets Latest Ref Range: 150 - 350 K/uL 157  206 164    MPV Latest Ref Range: 7.4 - 10.4 fL 7.3 (L)  7.0 (L) 7.2 (L)    Gran% Latest Ref Range: 38.0 - 73.0 % 66.0  62.8 66.2    Gran # (ANC) Latest Ref Range: 1.8 - 7.7 K/uL 2.8  2.2 3.0    Lymph% Latest Ref Range: 18.0 - 48.0 % 15.3 (L)  15.5 (L) 14.6 (L)    Lymph # Latest Ref Range: 1.0 - 4.8 K/uL 0.7 (L)  0.6 (L) 0.7 (L)    Mono% Latest Ref Range: 4.0 - 15.0 % 11.2  17.7 (H) 14.2    Mono # Latest Ref Range: 0.3 - 1.0 K/uL 0.5  0.6 0.6    Eosinophil% Latest Ref Range: 0.0 - 8.0 % 6.5  3.1 3.9    Eos # Latest Ref Range: 0.0 - 0.5 K/uL 0.3  0.1 0.2    Basophil% Latest Ref Range: 0.0 - 1.9 % 1.0  0.9 1.1    Baso # Latest Ref Range: 0.00 - 0.20 K/uL 0.00  0.00 0.00    nRBC Latest Ref Range: 0 /100 WBC 0  0 0    Differential Method Unknown Automated  Automated Automated    Sed Rate Latest Ref Range: 0 - 30 mm/Hr 56 (H)  91 (H)  73 (H)   Sodium Latest Ref Range: 136 - 145 mmol/L 135 (L)  139 137    Potassium Latest Ref Range: 3.5 - 5.1 mmol/L 3.8  3.8 3.8    Chloride Latest Ref Range: 95 - 110 mmol/L 98  102 102    CO2 Latest Ref Range: 23 - 29 mmol/L 31 (H)  29 29    BUN, Bld Latest Ref Range: 7 - 17 mg/dL 17  12 13    Creatinine Latest Ref Range: 0.70 - 1.20 mg/dL 0.70  0.60 (L) 0.70    eGFR if non African American Latest Ref Range: >60 mL/min/1.73 m^2 >60  >60 >60    eGFR if  Latest Ref Range: >60 mL/min/1.73 m^2 >60  >60 >60    Glucose Latest  Ref Range: 74 - 106 mg/dL 114 (H)  89 93    Calcium Latest Ref Range: 8.4 - 10.2 mg/dL 9.8  9.8 9.7    Alkaline Phosphatase Latest Ref Range: 38 - 145 U/L 65  63 58    PROTEIN TOTAL Latest Ref Range: 6.3 - 8.2 g/dL 9.3 (H)  8.9 (H) 8.4 (H)    Albumin Latest Ref Range: 3.5 - 5.2 g/dL 4.1  4.2 4.1    BILIRUBIN TOTAL Latest Ref Range: 0.2 - 1.3 mg/dL 0.4  0.5 0.4    AST Latest Ref Range: 14 - 36 U/L 85 (H)  64 (H) 62 (H)    ALT Latest Ref Range: 10 - 44 U/L 28  26 22    CRP Latest Ref Range: 0.0 - 10.0 mg/L <5.0  <5.0 <5.0    CPK Latest Ref Range: 30 - 135 U/L 115  79 101    Hemoglobin A1C External Latest Ref Range: 4.0 - 6.0 %     6.0   SARS-CoV2 (COVID-19) Qualitative PCR Latest Ref Range: Not Detected   Not Detected      Aldolase Latest Ref Range: < OR = 8.1 U/L 5.9  5.5  4.7     Medication List with Changes/Refills   Current Medications    AMLODIPINE (NORVASC) 10 MG TABLET    Take 1 tablet (10 mg total) by mouth once daily.    AZELASTINE (ASTELIN) 137 MCG (0.1 %) NASAL SPRAY    1 spray (137 mcg total) by Nasal route 2 (two) times daily.    CALCIUM CARBONATE (OS-ESTELA) 600 MG CALCIUM (1,500 MG) TAB    Take 600 mg by mouth 2 (two) times daily with meals.    CLOBETASOL (TEMOVATE) 0.05 % CREAM    Apply topically 2 (two) times daily.    HYDROXYCHLOROQUINE (PLAQUENIL) 200 MG TABLET    Take 1 tablet (200 mg total) by mouth 2 (two) times daily.    PREDNISONE (DELTASONE) 20 MG TABLET        TRIAMCINOLONE ACETONIDE 0.025% (KENALOG) 0.025 % CREAM    AAA on face BID x 1-2 wks then prn flares only    VITAMIN D (VITAMIN D3) 1000 UNITS TAB    Take 1,000 Units by mouth once daily.       Assessment/Plans:-  59 y.o.F with history of L sided infiltrating ductal carcinoma of the L breast (dx on Jan 2017), HTN, depression, gastritis, and recently diagnosed myositis (uncertain if it's autoimmune vs medication induced), present today for follow up because of concern about myositis flare.     Patient started on Atelizumab/Abraxane on  11/7/18.  Patient developed swelling of the face on 1/4/19 and went to urgent care and given short course of prednisone 20 mg BID. On 1/15/19, patient seen in hem/onc clinic with c/o of pressure and tightness around neck. CT scan of chest and neck did not reveal any vascular compression. Started on prednisone 60 mg with taper. On 1/22/18 patient with c/o proximal muscle weakness. CPK found to be elevated around 4k. Patient admitted and given solumedrol 80 mg IV on 1/22/18. Last infusion of Atelizumab on 12/19/18. Last infusion of Abraxane on 1/3/19. Given Solumedrol 1g x 1 on 1/23/18. Seen by Dermatology on 1/24/18 and biopsy done of skin rash. Given distribution of rashes, there was concern for dermatomyositis. Patient started on Solumedrol 125 mg IV BID at that time.  Skin biopsy resulted consistent with dermatomyositis.     Labs: CPK and Aldolase normalized. +DARCY 1:640 speckled with negative profile.  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 quantTB: indeterminate, T-spot: negative     Spirometry: normal, normal diffusion capacity. FVC: 81%, DLCO: 114%     Patient exhibits signs of dermatomyositis (heliotropic rash and gottron's papules).   Dermatomyositis can be associated with malignancy.  MRI of LUE showed edema of the deltoid and biceps.  Exam with proximal muscle weakness: b/l deltoids 4/5, b/l iliopsoas 4.4/5. 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   Given +TIF1 gamma positivity, most likely dermatomyositis is from underlying malignancy.      Failed 2nd swallow eval on 2/6/19. PEG  placed 2/7/2019.     Current medications:  - prednisone 20mg   - IVIG Started Jan 2019 - last dose April 7  - Rituxan 1000mg x 2 (last dose April 27)  - HCQ 200mg BID      Esophageal biopsy - negative for viral infection.  Nonspecific chronic inflammation.     EGD/Colonoscopy (July 2019) - normal. PEG tube removed      Fiboscan done Aug 2019 - showed fatty liver with no scarring/damage.,      Failed Cellcept - worsening leukopenia, elevated LFTs, and other side effects without improvement of symptoms      Recent studies demonstrated increased efficacy in IVIG when spaced out (Q2w instead of Q4w)     Patient is an intermediate metabolizer based on TPMT.  Cannot start imuran.  Labs still neutropenic and thrombocytopenic at this time - uncertain of the cause (radiation induce BM fibrosis vs medication induce?)      Vaccination completed - Prevnar and Shingles (completed Aug 2019) and flu vaccination for this season (Aug 2019)      Dermatomyositis - currently on Rituxan and Prednisone 20mg daily.  Tolerating well and improvement of symptoms.  Plan is taper steroid and continue Rituxan 1000mg x 2 every 6 months.     It appears that patient continued to fail steroid tapering on multiple occasion.  Patient is uptodate on all CA screening - next PET scan is July 2020.  Other consideration for continued myalgia includes neurological condition such as MG.  Will other MG panel and referral to neurology.     If patient continues to failed steroid tapering - consideration for tacrolimus + IVIG, Xeljanz, plasmapharesis/plasma exchange.      Plan:  - CBC, CMP, CPK, Aldolase, ESR, CRP to be done in 4 weeks and then prior to next visit  - Taper steroid (currently on prednisone 20mg) - will decrease 5mg every 4 weeks.  Hold at 10mg until next lab.   - continue muscle strengthening exercise daily  - 1200 mg dietary calcium and Vitamin D3 1000 mg daily  - MG panel   - referral to neurology   - continue HCQ 200mg BID   - Next Rituxan  dose due after Oct 27.  Will need Hep panel, Immunoglobulin level, and rituxan sensitivity prior   - message/call immediately if worsening muscle weakness  - Education provided on the importance of sunscreen usage         Follow up in 6 weeks

## 2020-06-17 NOTE — PROGRESS NOTES
59 year old female with dermatomyositis s/p PD1 inhibitor used for breast cancer  TIF1 gamma +    Low dose steroids didn't help    Rash+ muscle weakness+dysphagia     IVIG and steroids initial treatment  mtx causes LFT derangement,anemia and leukopenia   She didn't respond to IVIG, 1 g/kg bw ever 2 weeks  She was on cellcept 500 mg daily and she had cytopenias   Imuran not an option since she is intermediate metaboliser   She was on prednisone 10 mg    She had significant periorbital edema,rash on the neck and thighs    We didn't think she was responding to the treatment     So we gave her rituximab 1000 mg x 2    She did well and then she flared again may 2020    We had to go up on the dose to 30 mg and then tapered it to 20 mg  We started plaquenil  We want to start her on IVONNE inhibitors     CT chest abdomen and pelvis nml  Colonoscopy nml  PET lymphadenopathy  Echo nml    It almost looks like she needs prednisone 20 mg and more  I am not sure if rituximab or IVIG worked  Because she went back using the higher dose of prednisone sooner    mtx might be an option again        Plan       Slow taper of prednisone    If flares : ivig/tacrolimus ??? And PLEX and plasmapheresis     MG panel and neuro evaluation             Answers for HPI/ROS submitted by the patient on 6/15/2020   fever: No  eye redness: Yes  headaches: No  shortness of breath: No  chest pain: No  trouble swallowing: No  diarrhea: No  constipation: Yes  unexpected weight change: No  genital sore: No  dysuria: No  During the last 3 days, have you had a skin rash?: No  Bruises or bleeds easily: No  cough: No

## 2020-06-23 LAB
ACHR BIND AB SER-SCNC: 0 NMOL/L
ACHR MOD AB/ACHR TOTAL SFR SER: 0 %
STRIA MUS AB TITR SER: NEGATIVE TITER
VGCC-N BIND AB SER-SCNC: NORMAL PMOL/L

## 2020-06-30 ENCOUNTER — PATIENT MESSAGE (OUTPATIENT)
Dept: INTERNAL MEDICINE | Facility: CLINIC | Age: 60
End: 2020-06-30

## 2020-07-01 ENCOUNTER — PATIENT MESSAGE (OUTPATIENT)
Dept: INTERNAL MEDICINE | Facility: CLINIC | Age: 60
End: 2020-07-01

## 2020-07-01 ENCOUNTER — TELEPHONE (OUTPATIENT)
Dept: HEMATOLOGY/ONCOLOGY | Facility: CLINIC | Age: 60
End: 2020-07-01

## 2020-07-01 DIAGNOSIS — C50.412 MALIGNANT NEOPLASM OF UPPER-OUTER QUADRANT OF LEFT BREAST IN FEMALE, ESTROGEN RECEPTOR NEGATIVE: Primary | Chronic | ICD-10-CM

## 2020-07-01 DIAGNOSIS — Z17.1 MALIGNANT NEOPLASM OF UPPER-OUTER QUADRANT OF LEFT BREAST IN FEMALE, ESTROGEN RECEPTOR NEGATIVE: Primary | Chronic | ICD-10-CM

## 2020-07-01 NOTE — TELEPHONE ENCOUNTER
Spoke with pt. Pt stated she started with the body aches and congestion 2 days ago. Stated this morning her temp was 100.4. No shortness of breath. Pt stated she has been coughing up green mucus. Don't think she's been around no one with the COVID virus. Stated on April 24th she was negative for the virus.

## 2020-07-02 ENCOUNTER — TELEPHONE (OUTPATIENT)
Dept: HEMATOLOGY/ONCOLOGY | Facility: CLINIC | Age: 60
End: 2020-07-02

## 2020-07-02 ENCOUNTER — OFFICE VISIT (OUTPATIENT)
Dept: URGENT CARE | Facility: CLINIC | Age: 60
End: 2020-07-02
Payer: COMMERCIAL

## 2020-07-02 VITALS
OXYGEN SATURATION: 98 % | HEART RATE: 85 BPM | SYSTOLIC BLOOD PRESSURE: 134 MMHG | WEIGHT: 180 LBS | DIASTOLIC BLOOD PRESSURE: 70 MMHG | RESPIRATION RATE: 18 BRPM | BODY MASS INDEX: 29.99 KG/M2 | TEMPERATURE: 99 F | HEIGHT: 65 IN

## 2020-07-02 DIAGNOSIS — B96.89 ACUTE BACTERIAL BRONCHITIS: ICD-10-CM

## 2020-07-02 DIAGNOSIS — Z20.822 CLOSE EXPOSURE TO COVID-19 VIRUS: Primary | ICD-10-CM

## 2020-07-02 DIAGNOSIS — R50.9 FEVER: ICD-10-CM

## 2020-07-02 DIAGNOSIS — J20.8 ACUTE BACTERIAL BRONCHITIS: ICD-10-CM

## 2020-07-02 DIAGNOSIS — R05.9 COUGH: ICD-10-CM

## 2020-07-02 PROCEDURE — 99214 PR OFFICE/OUTPT VISIT, EST, LEVL IV, 30-39 MIN: ICD-10-PCS | Mod: S$GLB,,, | Performed by: NURSE PRACTITIONER

## 2020-07-02 PROCEDURE — U0003 INFECTIOUS AGENT DETECTION BY NUCLEIC ACID (DNA OR RNA); SEVERE ACUTE RESPIRATORY SYNDROME CORONAVIRUS 2 (SARS-COV-2) (CORONAVIRUS DISEASE [COVID-19]), AMPLIFIED PROBE TECHNIQUE, MAKING USE OF HIGH THROUGHPUT TECHNOLOGIES AS DESCRIBED BY CMS-2020-01-R: HCPCS

## 2020-07-02 PROCEDURE — 99214 OFFICE O/P EST MOD 30 MIN: CPT | Mod: S$GLB,,, | Performed by: NURSE PRACTITIONER

## 2020-07-02 RX ORDER — PREDNISONE 10 MG/1
TABLET ORAL
COMMUNITY
Start: 2020-06-24 | End: 2020-07-02

## 2020-07-02 RX ORDER — AZITHROMYCIN 250 MG/1
TABLET, FILM COATED ORAL
Qty: 6 TABLET | Refills: 0 | Status: SHIPPED | OUTPATIENT
Start: 2020-07-02 | End: 2020-07-07

## 2020-07-02 NOTE — TELEPHONE ENCOUNTER
"Called patient to reschedule the scans. Dr Reyna put in ct scans I called the [facility] to schedule ct's around  [employer] [employer]. They will try to work in patient in the morning after US.    Rescheduled the appt with Dr Reyna to Friday 7/10.      ----- Message from Flavia Barillas RN sent at 7/2/2020 10:21 AM CDT -----  Regarding: FW: Appt cancelled  Can someone please reach out to help with this?  ----- Message -----  From: Audrey Munoz  Sent: 7/2/2020  10:17 AM CDT  To: [facility] Staff  Subject: Appt cancelled                                   Patient Assist    Name of caller: Ermelinda   Provider name: polo MENESES MD   Contact Preference:  [phone]  Is this regarding current patient or new patient?: current   What is the nature of the call?    - pt states that the hospital cancelled the appt for the scan because   they haven't received the approval letter from the insurance and   everything will have to be r/s Please call .     Additional Notes:   "Thank you for all that you do for our patients'"              "

## 2020-07-02 NOTE — PROGRESS NOTES
"Subjective:       Patient ID: Ermelinda Verde is a 59 y.o. female.    Vitals:  height is 5' 5" (1.651 m) and weight is 81.6 kg (180 lb). Her oral temperature is 99.4 °F (37.4 °C). Her blood pressure is 134/70 and her pulse is 85. Her respiration is 18 and oxygen saturation is 98%.     Chief Complaint: Fever    Patient presents with cough, green sputum production and congestion with fever for the last 3 days, t max 100.4F this morning. Patient also states she feels achy . No other symptoms reported at this time.  No chest pain, no shortness of breath or dyspnea on exertion, no wheezing, no difficulty breathing. On daily prednisone and placquenil for recent dermatomyositis flare currently.     Fever   This is a new problem. The problem occurs intermittently. The problem has been unchanged. The temperature was taken using an oral thermometer. Associated symptoms include congestion, coughing and muscle aches. Pertinent negatives include no abdominal pain, chest pain, diarrhea, ear pain, headaches, nausea, rash, sleepiness, sore throat, urinary pain, vomiting or wheezing. Treatments tried: tylenol  The treatment provided mild relief.   Risk factors: immunosuppression    Risk factors: no contaminated food, no recent sickness, no recent travel and no sick contacts        Constitution: Positive for fatigue and fever. Negative for appetite change, chills and generalized weakness.        No loss of taste or smell.   HENT: Positive for congestion and sinus pressure. Negative for ear pain, postnasal drip, sinus pain, sore throat, trouble swallowing and voice change.    Neck: Negative for neck pain, neck stiffness and painful lymph nodes.   Cardiovascular: Negative for chest pain and leg swelling.   Eyes: Negative for eye discharge, eye itching, eye redness, double vision and blurred vision.   Respiratory: Positive for cough and sputum production. Negative for chest tightness, bloody sputum, COPD, shortness of breath, stridor, " wheezing and asthma.    Gastrointestinal: Negative for abdominal pain, nausea, vomiting, diarrhea, bright red blood in stool, dark colored stools, rectal bleeding and rectal pain.   Genitourinary: Negative for dysuria, frequency, urgency, urine decreased, flank pain, hematuria and history of kidney stones.   Musculoskeletal: Positive for muscle ache. Negative for joint pain, joint swelling and muscle cramps.   Skin: Positive for erythema. Negative for color change, pale, rash and bruising.   Allergic/Immunologic: Negative for seasonal allergies and asthma.   Neurological: Negative for dizziness, history of vertigo, light-headedness, passing out and headaches.   Hematologic/Lymphatic: Negative for swollen lymph nodes and easy bruising/bleeding. Does not bruise/bleed easily.   Psychiatric/Behavioral: Negative for nervous/anxious, sleep disturbance and depression. The patient is not nervous/anxious.        Objective:      Physical Exam   Constitutional: She is oriented to person, place, and time.  Non-toxic appearance. She does not appear ill. No distress.   HENT:   Head: Normocephalic and atraumatic.   Right Ear: External ear normal.   Left Ear: External ear normal.   Eyes: Conjunctivae are normal. Right eye exhibits no discharge. Left eye exhibits no discharge. No scleral icterus.   Neck: Normal range of motion. No neck rigidity.   Cardiovascular: Normal rate.   Pulmonary/Chest: Effort normal. No respiratory distress. She has no wheezes. She exhibits no tenderness (Per patient).    Comments: Talkative, speaking in full sentences without difficulty.  No audible wheezing.  No coughing currently.  Patient states cough is mild and intermittent.    Abdominal: Normal appearance. There is no abdominal tenderness ( per patient).   Neurological: She is alert and oriented to person, place, and time.   Skin: Skin is warm, dry and not diaphoretic.   Pt with mild facial erythema consistent with dermomyositis. Lesions:  erythema    Comments: Pt with mild facial erythema consistent with dermomyositis.     Psychiatric: Her behavior is normal. Mood, judgment and thought content normal.         Assessment:       1. Close Exposure to Covid-19 Virus    2. Acute bacterial bronchitis    3. Cough    4. Fever        Plan:       Alert, nontoxic and in NAD. Afebrile at present.  Patient with no evidence of respiratory distress.  Patient with bacterial tracheal bronchitis symptoms.  Will test for COVID.  Advised on COVID testing, signs and symptoms of COVID, symptomatic management at home, signs and symptoms to seek emergency care, [facility] quarantine guidelines for COVID.  Patient verbalized understanding and agreement treatment plan.    Close Exposure to Covid-19 Virus  -     COVID-19 Routine Screening  -     azithromycin (Z-CASTRO) 250 MG tablet; Take 2 tablets by mouth on day 1; Take 1 tablet by mouth on days 2-5  Dispense: 6 tablet; Refill: 0    Acute bacterial bronchitis  -     azithromycin (Z-CASTRO) 250 MG tablet; Take 2 tablets by mouth on day 1; Take 1 tablet by mouth on days 2-5  Dispense: 6 tablet; Refill: 0    Cough  -     COVID-19 Routine Screening    Fever  -     COVID-19 Routine Screening         Patient Instructions   ·     Bronchitis, Antibiotic Treatment (Adult)    Bronchitis is an infection of the air passages (bronchial tubes) in your lungs. It often occurs when you have a cold. This illness is contagious during the first few days and is spread through the air by coughing and sneezing, or by direct contact (touching the sick person and then touching your own eyes, nose, or mouth).  Symptoms of bronchitis include cough with mucus (phlegm) and low-grade fever. Bronchitis usually lasts 7 to 14 days. Mild cases can be treated with simple home remedies. More severe infection is treated with an antibiotic.  Home care  Follow these guidelines when caring for yourself at home:  If your symptoms are severe, rest at home for the first 2 to 3 days. When you  go back to your usual activities, don't let yourself get too tired.  Do not smoke. Also avoid being exposed to secondhand smoke.  You may use over-the-counter medicines to control fever or pain, unless another medicine was prescribed. (Note: If you have chronic liver or kidney disease or have ever had a stomach ulcer or gastrointestinal bleeding, talk with your healthcare provider before using these medicines. Also talk to your provider if you are taking medicine to prevent blood clots.) Aspirin should never be given to anyone younger than 18 years of age who is ill with a viral infection or fever. It may cause severe liver or brain damage.  Your appetite may be poor, so a light diet is fine. Avoid dehydration by drinking 6 to 8 glasses of fluids per day (such as water, soft drinks, sports drinks, juices, tea, or soup). Extra fluids will help loosen secretions in the nose and lungs.  Over-the-counter cough, cold, and sore-throat medicines will not shorten the length of the illness, but they may be helpful to reduce symptoms. (Note: Do not use decongestants if you have high blood pressure.)  Finish all antibiotic medicine. Do this even if you are feeling better after only a few days.  Follow-up care  Follow up with your healthcare provider, or as advised. If you had an X-ray or ECG (electrocardiogram), a specialist will review it. You will be notified of any new findings that may affect your care.  Note: If you are age 65 or older, or if you have a chronic lung disease or condition that affects your immune system, or you smoke, talk to your healthcare provider about having pneumococcal vaccinations and a yearly influenza vaccination (flu shot).  When to seek medical advice  Call your healthcare provider right away if any of these occur:  Fever of 100.4°F (38°C) or higher  Coughing up increased amounts of colored sputum  Weakness, drowsiness, headache, facial pain, ear pain, or a stiff neck  Call 911, or get immediate  medical care  Contact emergency services right away if any of these occur.  Coughing up blood  Worsening weakness, drowsiness, headache, or stiff neck  Trouble breathing, wheezing, or pain with breathing  Date Last Reviewed: 9/13/2015 © 2000-2017 [employer]. [address] [place] [address]. All rights reserved. This information is not intended as a substitute for professional medical care. Always follow your healthcare professional's instructions.      Instructions for Patients with Confirmed or Suspected COVID-19    If you are awaiting your test result, you will either be called or it will be released to the patient portal.  If you have any questions about your test, please visit [email or web address] or call our COVID-19 information line at [phone].      Preventing the Spread of Coronavirus Disease 2019 (COVID-19) in Homes and Residential Communities -- Patients     Prevention steps for people with confirmed or suspected COVID-19 (including persons under investigation) who do not need to be hospitalized and people with confirmed COVID-19 who were hospitalized and determined to be medically stable to go home.     Stay home except to get medical care.   Separate yourself from other people and animals in your home.   Call ahead before visiting your doctor.   Wear a face mask.   Cover your coughs and sneezes.   Clean your hands often.   Avoid sharing personal household items.   Clean all high-touch surfaces every day.   Monitor your symptoms. Seek prompt medical attention if your illness is worsening (e.g., difficulty breathing). Before seeking care, call your healthcare provider.   If you have a medical emergency and must call 911, notify the dispatcher that you have or are being evaluated for COVID-19. If possible, put on a face mask before emergency medical services arrive.   Use the following symptom-based strategy to return to normal activity following a suspected or  confirmed case of COVID-19. Continue isolation until:   At least 3 days (72 hours) have passed since recovery defined as resolution of fever without the use of fever-reducing medications and improvement in respiratory symptoms (e.g. cough, shortness of breath), and   At least 10 days have passed since symptoms first appeared.     Precautions for household members, intimate partners and caregivers in a non-healthcare setting of a patient with symptomatic laboratory-confirmed COVID-19 or a patient under investigation.     Household members, intimate partners and caregivers in a non-healthcare setting may have close contact with a person with symptomatic, laboratory-confirmed COVID-19 or a person under investigation. Close contacts should monitor their health; they should call their healthcare provider right away if they develop symptoms suggestive of COVID-19 (e.g., fever, cough, shortness of breath). Close contacts should also follow these recommendations:     Stay home for the duration of the time recommended by healthcare provider, except to get medical care. Separate yourself from other people and animals in the home.  Monitor the patients symptoms. If the patient is getting sicker, call his or her healthcare provider. If the patient has a medical emergency and you need to call 911, notify the dispatch personnel that the patient has or is being evaluated for COVID-19.   Wear a facemask when around other people such as sharing a room or vehicle and before entering a healthcare provider's office.  Cover coughs and sneezes with a tissue. Throw used tissues in a lined trash can immediately and wash hands.  Clean hands often with soap and water for at least 20 seconds or with an alcohol-based hand , rubbing hands together until they feel dry. Avoid touching your eyes, nose, and mouth with unwashed hands.  Clean all high-touch; surfaces every day, including counters, tabletops, doorknobs, bathroom fixtures,  toilets, phones, keyboards, tablets, bedside tables, etc. Use a household cleaning spray or wipe according to label instructions.  Avoid sharing personal household items such as dishes, drinking glasses, cups, towels, bedding, etc. After these items are used, they should be washed thoroughly with soap and water.  Use the following symptom-based strategy to return to normal activity following a suspected or confirmed case of COVID-19. Continue isolation until:   At least 3 days (72 hours) have passed since recovery defined as resolution of fever without the use of fever-reducing medications and improvement in respiratory symptoms (e.g. cough, shortness of breath), and   At least 10 days have passed since symptoms first appeared.  ·   ·   · Follow up with your primary care in 2-5 days if symptoms have not improved, or you may return here.  · If you were referred to a specialist, please follow up with that specialty.  · If you were prescribed antibiotics, please take them to completion.  · If you were prescribed a narcotic or any medication with sedative effects, do not drive or operate heavy equipment or machinery while taking these medications.  · You must understand that you have received treatment at an Urgent Care facility only, and that you may be released before all of your medical problems are known or treated. Urgent Care facilities are not equipped to handle life threatening emergencies. It is recommended that you go to an Emergency Department for further evaluation of worsening or concerning symptoms, or possibly life threatening conditions as discussed.                                        If you  smoke, please stop smoking

## 2020-07-02 NOTE — PATIENT INSTRUCTIONS
·     Bronchitis, Antibiotic Treatment (Adult)    Bronchitis is an infection of the air passages (bronchial tubes) in your lungs. It often occurs when you have a cold. This illness is contagious during the first few days and is spread through the air by coughing and sneezing, or by direct contact (touching the sick person and then touching your own eyes, nose, or mouth).  Symptoms of bronchitis include cough with mucus (phlegm) and low-grade fever. Bronchitis usually lasts 7 to 14 days. Mild cases can be treated with simple home remedies. More severe infection is treated with an antibiotic.  Home care  Follow these guidelines when caring for yourself at home:  If your symptoms are severe, rest at home for the first 2 to 3 days. When you go back to your usual activities, don't let yourself get too tired.  Do not smoke. Also avoid being exposed to secondhand smoke.  You may use over-the-counter medicines to control fever or pain, unless another medicine was prescribed. (Note: If you have chronic liver or kidney disease or have ever had a stomach ulcer or gastrointestinal bleeding, talk with your healthcare provider before using these medicines. Also talk to your provider if you are taking medicine to prevent blood clots.) Aspirin should never be given to anyone younger than 18 years of age who is ill with a viral infection or fever. It may cause severe liver or brain damage.  Your appetite may be poor, so a light diet is fine. Avoid dehydration by drinking 6 to 8 glasses of fluids per day (such as water, soft drinks, sports drinks, juices, tea, or soup). Extra fluids will help loosen secretions in the nose and lungs.  Over-the-counter cough, cold, and sore-throat medicines will not shorten the length of the illness, but they may be helpful to reduce symptoms. (Note: Do not use decongestants if you have high blood pressure.)  Finish all antibiotic medicine. Do this even if you are feeling better after only a few  days.  Follow-up care  Follow up with your healthcare provider, or as advised. If you had an X-ray or ECG (electrocardiogram), a specialist will review it. You will be notified of any new findings that may affect your care.  Note: If you are age 65 or older, or if you have a chronic lung disease or condition that affects your immune system, or you smoke, talk to your healthcare provider about having pneumococcal vaccinations and a yearly influenza vaccination (flu shot).  When to seek medical advice  Call your healthcare provider right away if any of these occur:  Fever of 100.4°F (38°C) or higher  Coughing up increased amounts of colored sputum  Weakness, drowsiness, headache, facial pain, ear pain, or a stiff neck  Call 911, or get immediate medical care  Contact emergency services right away if any of these occur.  Coughing up blood  Worsening weakness, drowsiness, headache, or stiff neck  Trouble breathing, wheezing, or pain with breathing  Date Last Reviewed: 9/13/2015  © 6230-7712 [employer]. [address] [place] [address]. All rights reserved. This information is not intended as a substitute for professional medical care. Always follow your healthcare professional's instructions.      Instructions for Patients with Confirmed or Suspected COVID-19    If you are awaiting your test result, you will either be called or it will be released to the patient portal.  If you have any questions about your test, please visit [email or web address] or call our COVID-19 information line at [phone].      Preventing the Spread of Coronavirus Disease 2019 (COVID-19) in Homes and Residential Communities -- Patients     Prevention steps for people with confirmed or suspected COVID-19 (including persons under investigation) who do not need to be hospitalized and people with confirmed COVID-19 who were hospitalized and determined to be medically stable to go home.     Stay home except to get  medical care.   Separate yourself from other people and animals in your home.   Call ahead before visiting your doctor.   Wear a face mask.   Cover your coughs and sneezes.   Clean your hands often.   Avoid sharing personal household items.   Clean all high-touch surfaces every day.   Monitor your symptoms. Seek prompt medical attention if your illness is worsening (e.g., difficulty breathing). Before seeking care, call your healthcare provider.   If you have a medical emergency and must call 911, notify the dispatcher that you have or are being evaluated for COVID-19. If possible, put on a face mask before emergency medical services arrive.   Use the following symptom-based strategy to return to normal activity following a suspected or confirmed case of COVID-19. Continue isolation until:   At least 3 days (72 hours) have passed since recovery defined as resolution of fever without the use of fever-reducing medications and improvement in respiratory symptoms (e.g. cough, shortness of breath), and   At least 10 days have passed since symptoms first appeared.     Precautions for household members, intimate partners and caregivers in a non-healthcare setting of a patient with symptomatic laboratory-confirmed COVID-19 or a patient under investigation.     Household members, intimate partners and caregivers in a non-healthcare setting may have close contact with a person with symptomatic, laboratory-confirmed COVID-19 or a person under investigation. Close contacts should monitor their health; they should call their healthcare provider right away if they develop symptoms suggestive of COVID-19 (e.g., fever, cough, shortness of breath). Close contacts should also follow these recommendations:     Stay home for the duration of the time recommended by healthcare provider, except to get medical care. Separate yourself from other people and animals in the home.  Monitor the patients symptoms. If the patient is getting  sicker, call his or her healthcare provider. If the patient has a medical emergency and you need to call 911, notify the dispatch personnel that the patient has or is being evaluated for COVID-19.   Wear a facemask when around other people such as sharing a room or vehicle and before entering a healthcare provider's office.  Cover coughs and sneezes with a tissue. Throw used tissues in a lined trash can immediately and wash hands.  Clean hands often with soap and water for at least 20 seconds or with an alcohol-based hand , rubbing hands together until they feel dry. Avoid touching your eyes, nose, and mouth with unwashed hands.  Clean all high-touch; surfaces every day, including counters, tabletops, doorknobs, bathroom fixtures, toilets, phones, keyboards, tablets, bedside tables, etc. Use a household cleaning spray or wipe according to label instructions.  Avoid sharing personal household items such as dishes, drinking glasses, cups, towels, bedding, etc. After these items are used, they should be washed thoroughly with soap and water.  Use the following symptom-based strategy to return to normal activity following a suspected or confirmed case of COVID-19. Continue isolation until:   At least 3 days (72 hours) have passed since recovery defined as resolution of fever without the use of fever-reducing medications and improvement in respiratory symptoms (e.g. cough, shortness of breath), and   At least 10 days have passed since symptoms first appeared.  ·   ·   · Follow up with your primary care in 2-5 days if symptoms have not improved, or you may return here.  · If you were referred to a specialist, please follow up with that specialty.  · If you were prescribed antibiotics, please take them to completion.  · If you were prescribed a narcotic or any medication with sedative effects, do not drive or operate heavy equipment or machinery while taking these medications.  · You must understand that you  have received treatment at an Urgent Care facility only, and that you may be released before all of your medical problems are known or treated. Urgent Care facilities are not equipped to handle life threatening emergencies. It is recommended that you go to an Emergency Department for further evaluation of worsening or concerning symptoms, or possibly life threatening conditions as discussed.                                        If you  smoke, please stop smoking

## 2020-07-05 LAB — SARS-COV-2 RNA RESP QL NAA+PROBE: NOT DETECTED

## 2020-07-08 ENCOUNTER — HOSPITAL ENCOUNTER (OUTPATIENT)
Dept: RADIOLOGY | Facility: HOSPITAL | Age: 60
Discharge: HOME | End: 2020-07-08
Attending: INTERNAL MEDICINE
Payer: COMMERCIAL

## 2020-07-08 ENCOUNTER — HOSPITAL ENCOUNTER (OUTPATIENT)
Dept: RADIOLOGY | Facility: HOSPITAL | Age: 60
Discharge: HOME | End: 2020-07-08
Attending: NURSE PRACTITIONER
Payer: COMMERCIAL

## 2020-07-08 DIAGNOSIS — K76.89 HEPATIC CYST: ICD-10-CM

## 2020-07-08 DIAGNOSIS — C50.412 MALIGNANT NEOPLASM OF UPPER-OUTER QUADRANT OF LEFT BREAST IN FEMALE, ESTROGEN RECEPTOR NEGATIVE: Chronic | ICD-10-CM

## 2020-07-08 DIAGNOSIS — Z17.1 MALIGNANT NEOPLASM OF UPPER-OUTER QUADRANT OF LEFT BREAST IN FEMALE, ESTROGEN RECEPTOR NEGATIVE: Chronic | ICD-10-CM

## 2020-07-08 PROCEDURE — 74177 CT ABD & PELVIS W/CONTRAST: CPT | Mod: TC

## 2020-07-08 PROCEDURE — 74177 CT ABDOMEN PELVIS WITH CONTRAST: ICD-10-PCS | Mod: 26,,, | Performed by: INTERNAL MEDICINE

## 2020-07-08 PROCEDURE — 74177 CT ABD & PELVIS W/CONTRAST: CPT | Mod: 26,,, | Performed by: INTERNAL MEDICINE

## 2020-07-08 PROCEDURE — 76700 US EXAM ABDOM COMPLETE: CPT | Mod: 26,,, | Performed by: RADIOLOGY

## 2020-07-08 PROCEDURE — 76700 US EXAM ABDOM COMPLETE: CPT | Mod: TC

## 2020-07-08 PROCEDURE — 76700 US ABDOMEN COMPLETE: ICD-10-PCS | Mod: 26,,, | Performed by: RADIOLOGY

## 2020-07-08 PROCEDURE — 25500020 PHARM REV CODE 255: Performed by: INTERNAL MEDICINE

## 2020-07-08 RX ADMIN — IOHEXOL 100 ML: 350 INJECTION, SOLUTION INTRAVENOUS at 11:07

## 2020-07-09 NOTE — PROGRESS NOTES
Subjective:       Patient ID: Ermelinda Verde is a 59 y.o. female.    Chief Complaint: No chief complaint on file.    HPI Mrs Verde is  a very pleasant 59-year-old -American female who returns for a diagnosis of recurrent triple negative left breast cancer.    Her course has been complicated by the development of dermatomyositis which necessitated stopping her chemo immunotherapy.    The dermatomyositis has been her biggest issue.  She is currently on prednisone 50 mg a day and Plaquenil.  She has significant ongoing rash on her face and extremities and reports that her muscle weakness is stable.  She has no swallowing problems.  Last week she developed fever and went to urgent care and was treated with a Zithromax I sent.  The fever resolved after 2 or 3 days.  She has no cough or shortness of breath.  Bowel function has been normal.         Onc History:  She developed a palpable abnormality in her left breast in January 2017 which she noted on self-examination.  A diagnostic mammogram on January 19 showed a greater than 1 cm nodule in the upper outer portion of left breast.  By ultrasound this was lobulated and hypoechoic measuring 1.75 x 1.51 x 1.96 cm.     On January 24, 2017 a core needle biopsy was performed which showed infiltrating ductal carcinoma, high grade.  The tumor was ER negative, MT negative, and HER-2 negative.  A follow-up ultrasound on December 6 showed 2.5 x 2.2 x 1.5 cm left breast mass.  There was no abnormality noted in the left axilla.     She underwent sentinel lymph node biopsy on February 22.  That showed 4 negative lymph nodes.     She had 4 cycles of  Wilbur-adjuvantTaxotere and Cytoxan completed  on 5/9/17.     On June 26 she underwent left mastectomy.  That revealed 2 foci of invasive high-grade carcinoma measuring 14 mm and 1.5 mm.  Margins were negative.    She completed 4 cycles of adjuvant Adriamycin on September 12, 2017.      In October 2018, she developed some left  supraclavicular lymphadenopathy which turned out to be recurrence.     A fine-needle aspirate of the lymph node was performed on October 19th.  That showed metastatic carcinoma consistent with breast primary which was ER negative, OK negative and HER 2-negative.    She then received 3 cycles of Nab paclitaxel and atezolizumab.  She last received treatment on January 3, 2019.    PET-CT in March, 2019 showed complete response.    She then had consolidative radiation therapy in May 2019.    Her treatment has been complicated by the development of dermatomyositis which resulted in the lengthy hospitalization from January 22nd to February 13th, 2019.  She is followed by Rheumatology and has been treated with steroids, IVIG, and low-dose methotrexate, and Cell-cept.  She received rituximab therapy 4/13/2- and 4/27/20.  Review of Systems   Constitutional: Negative for appetite change and unexpected weight change.   Eyes: Negative for visual disturbance.   Respiratory: Negative for cough and shortness of breath.    Cardiovascular: Negative for chest pain.   Gastrointestinal: Negative for abdominal pain and diarrhea.   Genitourinary: Negative for frequency.   Musculoskeletal: Negative for back pain.        Stiffness   Skin: Positive for rash.   Neurological: Positive for weakness. Negative for headaches.   Hematological: Negative for adenopathy.   Psychiatric/Behavioral: The patient is nervous/anxious.        Objective:      Physical Exam  Vitals signs reviewed.   Constitutional:       General: She is not in acute distress.     Appearance: She is well-developed.   Eyes:      General: No scleral icterus.  Cardiovascular:      Rate and Rhythm: Normal rate and regular rhythm.   Pulmonary:      Effort: Pulmonary effort is normal.      Breath sounds: Normal breath sounds. No wheezing or rales.   Chest:      Breasts:         Right: No mass, nipple discharge or skin change.         Abdominal:      Palpations: Abdomen is soft. There  is no mass.      Tenderness: There is no abdominal tenderness.   Lymphadenopathy:      Cervical: Cervical adenopathy present.      Right cervical: Superficial cervical adenopathy (1 cm) present.      Upper Body:      Right upper body: No supraclavicular or axillary adenopathy.      Left upper body: No supraclavicular or axillary adenopathy.   Skin:     Findings: Rash (face and arms) present.   Neurological:      Mental Status: She is alert and oriented to person, place, and time.   Psychiatric:         Behavior: Behavior normal.         Thought Content: Thought content normal.         Assessment:     CT shows no evidence of recurrent disease.  1. Malignant neoplasm of upper-outer quadrant of left breast in female, estrogen receptor negative        Plan:       She has Rheumatology F/U later this month.  Return in 3 months with labs and scans.      Port Flush

## 2020-07-10 ENCOUNTER — INFUSION (OUTPATIENT)
Dept: INFUSION THERAPY | Facility: HOSPITAL | Age: 60
End: 2020-07-10
Attending: INTERNAL MEDICINE
Payer: COMMERCIAL

## 2020-07-10 ENCOUNTER — OFFICE VISIT (OUTPATIENT)
Dept: HEMATOLOGY/ONCOLOGY | Facility: CLINIC | Age: 60
End: 2020-07-10
Payer: COMMERCIAL

## 2020-07-10 VITALS
WEIGHT: 182.75 LBS | TEMPERATURE: 98 F | HEART RATE: 109 BPM | SYSTOLIC BLOOD PRESSURE: 142 MMHG | OXYGEN SATURATION: 99 % | BODY MASS INDEX: 30.45 KG/M2 | HEIGHT: 65 IN | RESPIRATION RATE: 18 BRPM | DIASTOLIC BLOOD PRESSURE: 66 MMHG

## 2020-07-10 DIAGNOSIS — R13.19 ESOPHAGEAL DYSPHAGIA: ICD-10-CM

## 2020-07-10 DIAGNOSIS — M33.13 DERMATOMYOSITIS: Primary | ICD-10-CM

## 2020-07-10 DIAGNOSIS — C50.412 MALIGNANT NEOPLASM OF UPPER-OUTER QUADRANT OF LEFT BREAST IN FEMALE, ESTROGEN RECEPTOR NEGATIVE: Primary | Chronic | ICD-10-CM

## 2020-07-10 DIAGNOSIS — Z17.1 MALIGNANT NEOPLASM OF UPPER-OUTER QUADRANT OF LEFT BREAST IN FEMALE, ESTROGEN RECEPTOR NEGATIVE: Primary | Chronic | ICD-10-CM

## 2020-07-10 PROCEDURE — 99999 PR PBB SHADOW E&M-EST. PATIENT-LVL IV: ICD-10-PCS | Mod: PBBFAC,,, | Performed by: INTERNAL MEDICINE

## 2020-07-10 PROCEDURE — 99999 PR PBB SHADOW E&M-EST. PATIENT-LVL IV: CPT | Mod: PBBFAC,,, | Performed by: INTERNAL MEDICINE

## 2020-07-10 PROCEDURE — 25000003 PHARM REV CODE 250: Performed by: INTERNAL MEDICINE

## 2020-07-10 PROCEDURE — A4216 STERILE WATER/SALINE, 10 ML: HCPCS | Performed by: INTERNAL MEDICINE

## 2020-07-10 PROCEDURE — 3077F SYST BP >= 140 MM HG: CPT | Mod: CPTII,S$GLB,, | Performed by: INTERNAL MEDICINE

## 2020-07-10 PROCEDURE — 99214 PR OFFICE/OUTPT VISIT, EST, LEVL IV, 30-39 MIN: ICD-10-PCS | Mod: S$GLB,,, | Performed by: INTERNAL MEDICINE

## 2020-07-10 PROCEDURE — 63600175 PHARM REV CODE 636 W HCPCS: Performed by: INTERNAL MEDICINE

## 2020-07-10 PROCEDURE — 3008F PR BODY MASS INDEX (BMI) DOCUMENTED: ICD-10-PCS | Mod: CPTII,S$GLB,, | Performed by: INTERNAL MEDICINE

## 2020-07-10 PROCEDURE — 3078F DIAST BP <80 MM HG: CPT | Mod: CPTII,S$GLB,, | Performed by: INTERNAL MEDICINE

## 2020-07-10 PROCEDURE — 3077F PR MOST RECENT SYSTOLIC BLOOD PRESSURE >= 140 MM HG: ICD-10-PCS | Mod: CPTII,S$GLB,, | Performed by: INTERNAL MEDICINE

## 2020-07-10 PROCEDURE — 3008F BODY MASS INDEX DOCD: CPT | Mod: CPTII,S$GLB,, | Performed by: INTERNAL MEDICINE

## 2020-07-10 PROCEDURE — 96523 IRRIG DRUG DELIVERY DEVICE: CPT

## 2020-07-10 PROCEDURE — 3078F PR MOST RECENT DIASTOLIC BLOOD PRESSURE < 80 MM HG: ICD-10-PCS | Mod: CPTII,S$GLB,, | Performed by: INTERNAL MEDICINE

## 2020-07-10 PROCEDURE — 99214 OFFICE O/P EST MOD 30 MIN: CPT | Mod: S$GLB,,, | Performed by: INTERNAL MEDICINE

## 2020-07-10 RX ORDER — SODIUM CHLORIDE 0.9 % (FLUSH) 0.9 %
10 SYRINGE (ML) INJECTION
Status: CANCELLED | OUTPATIENT
Start: 2020-07-17

## 2020-07-10 RX ORDER — HEPARIN 100 UNIT/ML
500 SYRINGE INTRAVENOUS
Status: DISCONTINUED | OUTPATIENT
Start: 2020-07-10 | End: 2020-07-10 | Stop reason: HOSPADM

## 2020-07-10 RX ORDER — SODIUM CHLORIDE 0.9 % (FLUSH) 0.9 %
10 SYRINGE (ML) INJECTION
Status: DISCONTINUED | OUTPATIENT
Start: 2020-07-10 | End: 2020-07-10 | Stop reason: HOSPADM

## 2020-07-10 RX ORDER — ACETAMINOPHEN 325 MG/1
650 TABLET ORAL
Status: CANCELLED | OUTPATIENT
Start: 2020-07-17

## 2020-07-10 RX ORDER — HEPARIN 100 UNIT/ML
500 SYRINGE INTRAVENOUS
Status: CANCELLED | OUTPATIENT
Start: 2020-07-17

## 2020-07-10 RX ORDER — FAMOTIDINE 10 MG/ML
20 INJECTION INTRAVENOUS
Status: CANCELLED | OUTPATIENT
Start: 2020-07-17

## 2020-07-10 RX ADMIN — HEPARIN 500 UNITS: 100 SYRINGE at 10:07

## 2020-07-10 RX ADMIN — SODIUM CHLORIDE, PRESERVATIVE FREE 10 ML: 5 INJECTION INTRAVENOUS at 10:07

## 2020-07-10 NOTE — NURSING
1020 pt here for port flush, site cleaned per policy and accessed without issue, good blood return, port flushed and heparinized, deaccessed without issue, no distress noted upon d/c to home

## 2020-07-13 DIAGNOSIS — R74.01 ELEVATED AST (SGOT): Primary | ICD-10-CM

## 2020-07-29 ENCOUNTER — TELEPHONE (OUTPATIENT)
Dept: PHARMACY | Facility: CLINIC | Age: 60
End: 2020-07-29

## 2020-07-29 ENCOUNTER — OFFICE VISIT (OUTPATIENT)
Dept: RHEUMATOLOGY | Facility: CLINIC | Age: 60
End: 2020-07-29
Payer: COMMERCIAL

## 2020-07-29 VITALS
HEART RATE: 102 BPM | SYSTOLIC BLOOD PRESSURE: 115 MMHG | BODY MASS INDEX: 31.51 KG/M2 | HEIGHT: 65 IN | TEMPERATURE: 98 F | DIASTOLIC BLOOD PRESSURE: 61 MMHG | WEIGHT: 189.13 LBS

## 2020-07-29 DIAGNOSIS — M33.13 DERMATOMYOSITIS: Primary | ICD-10-CM

## 2020-07-29 PROCEDURE — 3008F BODY MASS INDEX DOCD: CPT | Mod: CPTII,S$GLB,, | Performed by: INTERNAL MEDICINE

## 2020-07-29 PROCEDURE — 99999 PR PBB SHADOW E&M-EST. PATIENT-LVL III: ICD-10-PCS | Mod: PBBFAC,,, | Performed by: INTERNAL MEDICINE

## 2020-07-29 PROCEDURE — 3074F PR MOST RECENT SYSTOLIC BLOOD PRESSURE < 130 MM HG: ICD-10-PCS | Mod: CPTII,S$GLB,, | Performed by: INTERNAL MEDICINE

## 2020-07-29 PROCEDURE — 3008F PR BODY MASS INDEX (BMI) DOCUMENTED: ICD-10-PCS | Mod: CPTII,S$GLB,, | Performed by: INTERNAL MEDICINE

## 2020-07-29 PROCEDURE — 99214 PR OFFICE/OUTPT VISIT, EST, LEVL IV, 30-39 MIN: ICD-10-PCS | Mod: S$GLB,,, | Performed by: INTERNAL MEDICINE

## 2020-07-29 PROCEDURE — 99999 PR PBB SHADOW E&M-EST. PATIENT-LVL III: CPT | Mod: PBBFAC,,, | Performed by: INTERNAL MEDICINE

## 2020-07-29 PROCEDURE — 99214 OFFICE O/P EST MOD 30 MIN: CPT | Mod: S$GLB,,, | Performed by: INTERNAL MEDICINE

## 2020-07-29 PROCEDURE — 3078F DIAST BP <80 MM HG: CPT | Mod: CPTII,S$GLB,, | Performed by: INTERNAL MEDICINE

## 2020-07-29 PROCEDURE — 3074F SYST BP LT 130 MM HG: CPT | Mod: CPTII,S$GLB,, | Performed by: INTERNAL MEDICINE

## 2020-07-29 PROCEDURE — 3078F PR MOST RECENT DIASTOLIC BLOOD PRESSURE < 80 MM HG: ICD-10-PCS | Mod: CPTII,S$GLB,, | Performed by: INTERNAL MEDICINE

## 2020-07-29 RX ORDER — HEPARIN 100 UNIT/ML
500 SYRINGE INTRAVENOUS
Status: CANCELLED | OUTPATIENT
Start: 2020-07-29

## 2020-07-29 RX ORDER — FAMOTIDINE 10 MG/ML
20 INJECTION INTRAVENOUS
Status: CANCELLED | OUTPATIENT
Start: 2020-07-29

## 2020-07-29 RX ORDER — ACETAMINOPHEN 325 MG/1
650 TABLET ORAL
Status: CANCELLED | OUTPATIENT
Start: 2020-07-29

## 2020-07-29 RX ORDER — TOFACITINIB 5 MG/1
5 TABLET, FILM COATED ORAL 2 TIMES DAILY
Qty: 60 TABLET | Refills: 11 | Status: SHIPPED | OUTPATIENT
Start: 2020-07-29 | End: 2021-08-24 | Stop reason: SDUPTHER

## 2020-07-29 RX ORDER — SODIUM CHLORIDE 0.9 % (FLUSH) 0.9 %
10 SYRINGE (ML) INJECTION
Status: CANCELLED | OUTPATIENT
Start: 2020-07-29

## 2020-07-29 NOTE — TELEPHONE ENCOUNTER
Informed Patient  that [facility] received prescription for Xeljanz and prior authorization is required.  [facility] will be back in touch once insurance determination is received.

## 2020-07-29 NOTE — PROGRESS NOTES
RHEUMATOLOGY CLINIC FOLLOW UP VISIT  Chief complaints:-  To follow up for Myositis follow up     HPI:-  Ermelinda Javed a 59 y.o. pleasant female  with history of L sided infiltrating ductal carcinoma of the L breast (dx on Jan 2017), HTN, depression, gastritis, and recently diagnosed myositis (uncertain if it's autoimmune vs medication induced), present today with concern about myositis flare     Patient was initially send from hem/onc clinic for evaluation of possible inflammatory myositis.  Patient was hospitalized from 1/22/19 till 2/13/19 for myositis.      L breast cancer s/p completion of 4 cycles of demi-adjuvant taxotere and cytoxan (completed on 5/9/17) and mastectomy (6/26/17) and then 4 cycles of adjuvant Adriamycin (completed 9/12/17). In 10/2017 - patient developed L supraclavicular lymphadenopathy which FNA confirmed as reoccurrence of previously treated breast cancer.      Patient started on Atelizumab/Abraxane on 11/7/18. Per chart review, patient noticed rashes on the dorsum of her hands on 11/11/18. Seen in urgent care and given topical steroid cream. Then received two more infusions on Atelizumab/Abraxane on 11/21/18 and 12/5/18. Started to notice puffiness around the eyes on 12/11/18. Received another Abraxane infusion on 12/12/18 but this time with hydrocortisone 50 mg which helped reduce the swelling around the eyes. Developed swelling of the face again on 12/15/18. Next infusion of Abraxane done on 12/19/18 with Solucortef and Atelizumab held. Abraxane given again on 1/3/19 with no IV steroid. Patient developed swelling of the face on 1/4/19 and went to urgent care and given short course of prednisone 20 mg BID. On 1/15/19, patient seen in hem/onc clinic with c/o of pressure and tightness around neck. CT scan of chest and neck did not reveal any vascular compression. Started on prednisone 60 mg with taper. On 1/22/18 patient with c/o  proximal muscle weakness. CPK found to be elevated around 4k. Patient admitted and given solumedrol 80 mg IV on 1/22/18. Last infusion of Atelizumab on 12/19/18. Last infusion of Abraxane on 1/3/19. Given Solumedrol 1g x 1 on 1/23/18. Seen by Dermatology on 1/24/18 and biopsy done of skin rash. Given distribution of rashes, there was concern for dermatomyositis. Patient started on Solumedrol 125 mg IV BID at this time.      During her hospitalization patient was started on high dose steroid Solumedrol 80mg IV x 1, 1g x 1, and then 125mg BID until tapered down to medrol 48mg.  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  Patient was started on IVIG (400mg/kg/daily x 5 day) 1/29/19-2/2.   Patient started on MTX 20mg SQ (Feb 5 2019) with folic acid. MTX SQ was transitioned to PO because concern about rehab administration.  Patient failed swallow eval and PEG tube was placed.        Denies any family history of autoimmune diseases.     No smoking, EtOH, recreational drug usage.     Denies any photosensitivity, joint swelling, unintentional weigh loss, abdominal pain, night sweats, CP, SOB.  +oral thrush.      Completed rehab at [facility].  Completed IVIG (2/28 and 3/1).  Had mild HA after infusion.  Doing well.  A lot stronger - able to do household chores since discharge.  Not back at baseline yet ~80%.  Mild weakness with increased activities.  Able to ambulate without assistance (but is still a fall precaution).  Tolerating diet via PEG tube.  Completed rehab.      MTX discontinued 2/18 with concern of toxicity (decrease blood counts and transaminatis).  Folic acid increased to 4mg daily.  Still on medrol 32mg daily with atorvaquone for PCP prophylaxis.  Bactrim d/c because of interaction with leucovorin.  Leucovorin 5mg daily started - Leucovorin discontinued.  Continues to be on folic acid 4mg daily     Radiation completed (28 days) completed May 8.       EGD/colonoscopy done on July 29 - normal.  PEG  tube removed     Last PET scan (June 20) showed negative for metastasis.  At this time, will monitor per oncology and repeat PET scan in Sept.  No treatment needed at this time.      Rash was biopsied and evaluated by dermatology.   Biopsy report conclusive of dermatomyositis.  Was given topical steroid which provided minimal alleviation of symptoms.  +paco      6/2020    Completed Rituxan on 4/27 (1g x 2).  Had a flare after infusion requiring increase of steroid.  Since then patient had been doing well - improving of myalgia and rash.  Still having mild edema and generalized weakness (especially in the afternoon/evening).       Patient was started on HCQ - compliant on all home medications.  Continues to work out daily.  Finds most difficulty with getting in and out of the car.   Denies any dysphagia, alopecia, arthralgia, abdominal pain, CP, SOB, N/V, chills, or urinary symptoms.      7/2020    Rash all over her body  Red rash on the left leg  Face once again has erythema and heliotrope rash  Prednisone down to 10 mg and looks like she has a flare again  On plaquenil 200 mg bid  Wears sunscreen  Avoids the direct sun    Most recent labs     Ck nml  Aldolase nml  ESR 53  CRP nml  Mild anemia  AST 67    Review of Systems   Constitutional: Negative for chills, diaphoresis, fever, malaise/fatigue and weight loss.   HENT: Negative for congestion, ear discharge, ear pain, hearing loss, nosebleeds, sinus pain and tinnitus.    Eyes: Positive for discharge. Negative for photophobia, pain and redness.   Respiratory: Negative for cough, hemoptysis, sputum production, shortness of breath, wheezing and stridor.    Cardiovascular: Negative for chest pain, palpitations, orthopnea, claudication, leg swelling and PND.   Gastrointestinal: Positive for constipation. Negative for abdominal pain, diarrhea, heartburn, nausea and vomiting.   Genitourinary: Negative for dysuria, frequency, hematuria and urgency.   Musculoskeletal:  Positive for myalgias. Negative for back pain, joint pain and neck pain.   Skin: Positive for rash.   Neurological: Negative for dizziness, tingling, tremors, weakness and headaches.   Endo/Heme/Allergies: Does not bruise/bleed easily.   Psychiatric/Behavioral: Negative for depression, hallucinations and suicidal ideas. The patient is not nervous/anxious and does not have insomnia.        Past Medical History:   Diagnosis Date    Breast cancer 2017    left    Depression     Dermatomyositis     Diverticulosis     Gastritis     Hypertension     Vitamin B12 deficiency 3/8/2018       Past Surgical History:   Procedure Laterality Date    BREAST BIOPSY Left     BREAST RECONSTRUCTION Left 2017     SECTION      COLONOSCOPY  2011    repeat in 10 yrs.    COLONOSCOPY N/A 2019    Procedure: COLONOSCOPY;  Surgeon: Pernell Rosas MD;  Location: [facility] (4TH FLR);  Service: Endoscopy;  Laterality: N/A;  Patient would like to use her PEG tube for bowel prep and then have her PEG tube removed after EGD and colonoscopy procedures while she is still sedated.    D&C      ESOPHAGOGASTRODUODENOSCOPY N/A 2019    Procedure: EGD (ESOPHAGOGASTRODUODENOSCOPY);  Surgeon: Pernell Rosas MD;  Location: [facility] (2ND FLR);  Service: Endoscopy;  Laterality: N/A;    ESOPHAGOGASTRODUODENOSCOPY N/A 2019    Procedure: EGD (ESOPHAGOGASTRODUODENOSCOPY);  Surgeon: Pernell Rosas MD;  Location: [facility] (4TH FLR);  Service: Endoscopy;  Laterality: N/A;  EGD for follow-up of erosive esophagitis per Dr. Rosas    Patient would like to use her PEG tube for bowel prep and then have her PEG tube removed after EGD and colonoscopy procedures while she is still sedated.    INSERTION OF TUNNELED CENTRAL VENOUS CATHETER (CVC) WITH SUBCUTANEOUS PORT Right 10/31/2018    Procedure: [id number];  Surgeon: Deo Palencia MD;  Location: [address];  Service: General;  Laterality: Right;    MASTECTOMY Left  "06/26/2017    MYOMECTOMY      PORTACATH PLACEMENT      SENTINEL LYMPH NODE BIOPSY  02/2017    left    TOTAL REDUCTION MAMMOPLASTY Right 2017    UPPER GASTROINTESTINAL ENDOSCOPY          Social History     Tobacco Use    Smoking status: Never Smoker    Smokeless tobacco: Never Used   Substance Use Topics    Alcohol use: Yes     Frequency: Never     Drinks per session: Patient refused     Binge frequency: Never     Comment: rare    Drug use: No       Family History   Problem Relation Age of Onset    Heart disease Father     Hypertension Father     Breast cancer Sister 52    Hypertension Mother     No Known Problems Brother     Thyroid disease Daughter         hyperthyroidism s/p thyroidectomy    No Known Problems Son     No Known Problems Brother     No Known Problems Brother     No Known Problems Sister     No Known Problems Daughter     Colon cancer Neg Hx     Ovarian cancer Neg Hx     Celiac disease Neg Hx     Cirrhosis Neg Hx     Colon polyps Neg Hx     Esophageal cancer Neg Hx     Inflammatory bowel disease Neg Hx     Liver cancer Neg Hx     Liver disease Neg Hx     Rectal cancer Neg Hx     Stomach cancer Neg Hx     Ulcerative colitis Neg Hx     Melanoma Neg Hx        Review of patient's allergies indicates:  No Known Allergies    Vitals:    07/29/20 0848   BP: 115/61   Pulse: 102   Temp: 97.6 °F (36.4 °C)   TempSrc: Temporal   Weight: 85.8 kg (189 lb 2.5 oz)   Height: 5' 5" (1.651 m)   PainSc:   2       Physical Exam   Constitutional: She is oriented to person, place, and time and well-developed, well-nourished, and in no distress.   HENT:   Head: Normocephalic and atraumatic.   Eyes: Pupils are equal, round, and reactive to light. EOM are normal.   Neck: Normal range of motion. Neck supple.   Cardiovascular: Normal rate, regular rhythm and normal heart sounds.   Pulmonary/Chest: Effort normal and breath sounds normal.   Abdominal: Soft. Bowel sounds are normal. "   Musculoskeletal: Normal range of motion.   Neurological: She is alert and oriented to person, place, and time. Gait normal. GCS score is 15.   Skin: Skin is warm and dry. No rash noted. No erythema.   guttron's papules on bilateral hands   heliotropic rash      Psychiatric: Mood, memory, affect and judgment normal.     Erythematous rash on the legs     Labs:  Results for ROMEO PEREZ (MRN [id number]) as of 6/17/2020 10:12   Ref. Range 4/22/2020 11:51 4/24/2020 11:43 5/25/2020 09:34 6/10/2020 11:25 6/10/2020 11:26   WBC Latest Ref Range: 3.90 - 12.70 K/uL 4.30  3.60 (L) 4.60    RBC Latest Ref Range: 4.00 - 5.40 M/uL 4.49  4.52 4.47    Hemoglobin Latest Ref Range: 12.0 - 16.0 g/dL 12.1  12.5 12.4    Hematocrit Latest Ref Range: 37.0 - 48.5 % 38.2  38.7 38.4    MCV Latest Ref Range: 82 - 98 fL 85  86 86    MCH Latest Ref Range: 27.0 - 31.0 pg 27.0  27.7 27.8    MCHC Latest Ref Range: 32.0 - 36.0 g/dL 31.8 (L)  32.4 32.4    RDW Latest Ref Range: 11.5 - 14.5 % 16.0 (H)  17.2 (H) 17.3 (H)    Platelets Latest Ref Range: 150 - 350 K/uL 157  206 164    MPV Latest Ref Range: 7.4 - 10.4 fL 7.3 (L)  7.0 (L) 7.2 (L)    Gran% Latest Ref Range: 38.0 - 73.0 % 66.0  62.8 66.2    Gran # (ANC) Latest Ref Range: 1.8 - 7.7 K/uL 2.8  2.2 3.0    Lymph% Latest Ref Range: 18.0 - 48.0 % 15.3 (L)  15.5 (L) 14.6 (L)    Lymph # Latest Ref Range: 1.0 - 4.8 K/uL 0.7 (L)  0.6 (L) 0.7 (L)    Mono% Latest Ref Range: 4.0 - 15.0 % 11.2  17.7 (H) 14.2    Mono # Latest Ref Range: 0.3 - 1.0 K/uL 0.5  0.6 0.6    Eosinophil% Latest Ref Range: 0.0 - 8.0 % 6.5  3.1 3.9    Eos # Latest Ref Range: 0.0 - 0.5 K/uL 0.3  0.1 0.2    Basophil% Latest Ref Range: 0.0 - 1.9 % 1.0  0.9 1.1    Baso # Latest Ref Range: 0.00 - 0.20 K/uL 0.00  0.00 0.00    nRBC Latest Ref Range: 0 /100 WBC 0  0 0    Differential Method Unknown Automated  Automated Automated    Sed Rate Latest Ref Range: 0 - 30 mm/Hr 56 (H)  91 (H)  73 (H)   Sodium Latest Ref Range: 136 - 145 mmol/L 135  (L)  139 137    Potassium Latest Ref Range: 3.5 - 5.1 mmol/L 3.8  3.8 3.8    Chloride Latest Ref Range: 95 - 110 mmol/L 98  102 102    CO2 Latest Ref Range: 23 - 29 mmol/L 31 (H)  29 29    BUN, Bld Latest Ref Range: 7 - 17 mg/dL 17  12 13    Creatinine Latest Ref Range: 0.70 - 1.20 mg/dL 0.70  0.60 (L) 0.70    eGFR if non African American Latest Ref Range: >60 mL/min/1.73 m^2 >60  >60 >60    eGFR if  Latest Ref Range: >60 mL/min/1.73 m^2 >60  >60 >60    Glucose Latest Ref Range: 74 - 106 mg/dL 114 (H)  89 93    Calcium Latest Ref Range: 8.4 - 10.2 mg/dL 9.8  9.8 9.7    Alkaline Phosphatase Latest Ref Range: 38 - 145 U/L 65  63 58    PROTEIN TOTAL Latest Ref Range: 6.3 - 8.2 g/dL 9.3 (H)  8.9 (H) 8.4 (H)    Albumin Latest Ref Range: 3.5 - 5.2 g/dL 4.1  4.2 4.1    BILIRUBIN TOTAL Latest Ref Range: 0.2 - 1.3 mg/dL 0.4  0.5 0.4    AST Latest Ref Range: 14 - 36 U/L 85 (H)  64 (H) 62 (H)    ALT Latest Ref Range: 10 - 44 U/L 28  26 22    CRP Latest Ref Range: 0.0 - 10.0 mg/L <5.0  <5.0 <5.0    CPK Latest Ref Range: 30 - 135 U/L 115  79 101    Hemoglobin A1C External Latest Ref Range: 4.0 - 6.0 %     6.0   SARS-CoV2 (COVID-19) Qualitative PCR Latest Ref Range: Not Detected   Not Detected      Aldolase Latest Ref Range: < OR = 8.1 U/L 5.9  5.5  4.7     Medication List with Changes/Refills   New Medications    TOFACITINIB (XELJANZ) 5 MG TAB    Take 5 mg by mouth 2 (two) times daily.   Current Medications    AMLODIPINE (NORVASC) 10 MG TABLET    Take 1 tablet (10 mg total) by mouth once daily.    AZELASTINE (ASTELIN) 137 MCG (0.1 %) NASAL SPRAY    1 spray (137 mcg total) by Nasal route 2 (two) times daily.    CALCIUM CARBONATE (OS-ESTELA) 600 MG CALCIUM (1,500 MG) TAB    Take 600 mg by mouth 2 (two) times daily with meals.    CLOBETASOL (TEMOVATE) 0.05 % CREAM    Apply topically 2 (two) times daily.    PREDNISONE (DELTASONE) 20 MG TABLET        TRIAMCINOLONE ACETONIDE 0.025% (KENALOG) 0.025 % CREAM    AAA on face  BID x 1-2 wks then prn flares only    VITAMIN D (VITAMIN D3) 1000 UNITS TAB    Take 1,000 Units by mouth once daily.       Assessment/Plans:-  59 y.o.F with history of L sided infiltrating ductal carcinoma of the L breast (dx on Jan 2017), HTN, depression, gastritis, and recently diagnosed myositis (uncertain if it's autoimmune vs medication induced), present today for follow up because of concern about myositis flare.     Patient started on Atelizumab/Abraxane on 11/7/18.  Patient developed swelling of the face on 1/4/19 and went to urgent care and given short course of prednisone 20 mg BID. On 1/15/19, patient seen in hem/onc clinic with c/o of pressure and tightness around neck. CT scan of chest and neck did not reveal any vascular compression. Started on prednisone 60 mg with taper. On 1/22/18 patient with c/o proximal muscle weakness. CPK found to be elevated around 4k. Patient admitted and given solumedrol 80 mg IV on 1/22/18. Last infusion of Atelizumab on 12/19/18. Last infusion of Abraxane on 1/3/19. Given Solumedrol 1g x 1 on 1/23/18. Seen by Dermatology on 1/24/18 and biopsy done of skin rash. Given distribution of rashes, there was concern for dermatomyositis. Patient started on Solumedrol 125 mg IV BID at that time.  Skin biopsy resulted consistent with dermatomyositis.     Labs: CPK and Aldolase normalized. +DARCY 1:640 speckled with negative profile.  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 quantTB: indeterminate, T-spot: negative     Spirometry: normal, normal diffusion capacity. FVC: 81%, DLCO: 114%     Patient exhibits signs of dermatomyositis (heliotropic rash and gottron's papules).   Dermatomyositis can be associated with malignancy.  MRI of LUE showed edema of the deltoid and biceps.  Exam with proximal  muscle weakness: b/l deltoids 4/5, b/l iliopsoas 4.4/5. 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   Given +TIF1 gamma positivity, most likely dermatomyositis is from underlying malignancy.      Failed 2nd swallow eval on 2/6/19. PEG placed 2/7/2019.     Current medications:  - prednisone 20mg   - IVIG Started Jan 2019 - last dose April 7  - Rituxan 1000mg x 2 (last dose April 27)  - HCQ 200mg BID      Esophageal biopsy - negative for viral infection.  Nonspecific chronic inflammation.     EGD/Colonoscopy (July 2019) - normal. PEG tube removed      Fiboscan done Aug 2019 - showed fatty liver with no scarring/damage.,      Failed Cellcept - worsening leukopenia, elevated LFTs, and other side effects without improvement of symptoms      Recent studies demonstrated increased efficacy in IVIG when spaced out (Q2w instead of Q4w)     Patient is an intermediate metabolizer based on TPMT.  Cannot start imuran.  Labs still neutropenic and thrombocytopenic at this time - uncertain of the cause (radiation induce BM fibrosis vs medication induce?)      Vaccination completed - Prevnar and Shingles (completed Aug 2019) and flu vaccination for this season (Aug 2019)      Dermatomyositis - currently on Rituxan and Prednisone 20mg daily.  Tolerating well and improvement of symptoms.  Plan is taper steroid and continue Rituxan 1000mg x 2 every 6 months.     It appears that patient continued to fail steroid tapering on multiple occasion.  Patient is uptodate on all CA screening - next PET scan is July 2020.  Other consideration for continued myalgia includes neurological condition such as MG.  Will other MG panel and referral to neurology.     If patient continues to failed steroid tapering -  consideration for tacrolimus + IVIG, Xeljanz, plasmapharesis/plasma exchange.       My addendum last time    59 year old female with dermatomyositis s/p PD1 inhibitor used for breast cancer  TIF1 gamma +    Low dose steroids didn't help    Rash+ muscle weakness+dysphagia     IVIG and steroids initial treatment    mtx causes LFT derangement,anemia and leukopenia   She didn't respond to IVIG, initially we gave IVIG 2 g/kg every 4 weeks and then 1 g/kg bw ever 2 weeks : poor response   She was on cellcept 500 mg daily and she had cytopenias   Imuran not an option since she is intermediate metaboliser     She had significant periorbital edema,rash on the neck and thighs    We didn't think she was responding to the treatment     So we gave her rituximab 1000 mg x 2    She did well and then she flared again may 2020    We had to go up on the dose to 30 mg and then tapered it to 20 mg  We started plaquenil    CT chest abdomen and pelvis nml  Colonoscopy nml  PET lymphadenopathy  Echo nml    Myasthenia gravis panel negative     Now on prednisone taper down to 10 mg   Once again skin has flared    Most recent labs     Ck nml  Aldolase nml  ESR 53  CRP nml  Mild anemia  AST 67    Refractory dermatomyositis    Plan         -never had blood clot  -never had diverticulitis      Will resume IVIG  Will offer tofacitinib 5 mg bid    D/c plaquenil    Stay on prednisone 10 mg    Sun screen overtly emphasised    - continue muscle strengthening exercise daily  - 1200 mg dietary calcium and Vitamin D3 1000 mg daily      Follow up in 2 months         Answers for HPI/ROS submitted by the patient on 7/25/2020   trouble swallowing: No  unexpected weight change: No  genital sore: No

## 2020-07-30 ENCOUNTER — INFUSION (OUTPATIENT)
Dept: INFECTIOUS DISEASES | Facility: HOSPITAL | Age: 60
End: 2020-07-30
Attending: INTERNAL MEDICINE
Payer: COMMERCIAL

## 2020-07-30 VITALS
DIASTOLIC BLOOD PRESSURE: 73 MMHG | HEART RATE: 100 BPM | SYSTOLIC BLOOD PRESSURE: 138 MMHG | OXYGEN SATURATION: 99 % | RESPIRATION RATE: 16 BRPM | WEIGHT: 187.06 LBS | BODY MASS INDEX: 31.13 KG/M2 | TEMPERATURE: 98 F

## 2020-07-30 DIAGNOSIS — R13.19 ESOPHAGEAL DYSPHAGIA: ICD-10-CM

## 2020-07-30 DIAGNOSIS — M33.13 DERMATOMYOSITIS: Primary | ICD-10-CM

## 2020-07-30 PROCEDURE — 96367 TX/PROPH/DG ADDL SEQ IV INF: CPT

## 2020-07-30 PROCEDURE — 96365 THER/PROPH/DIAG IV INF INIT: CPT

## 2020-07-30 PROCEDURE — 63600175 PHARM REV CODE 636 W HCPCS: Mod: JG | Performed by: INTERNAL MEDICINE

## 2020-07-30 PROCEDURE — S0028 INJECTION, FAMOTIDINE, 20 MG: HCPCS | Performed by: INTERNAL MEDICINE

## 2020-07-30 PROCEDURE — 25000003 PHARM REV CODE 250: Performed by: INTERNAL MEDICINE

## 2020-07-30 PROCEDURE — 96366 THER/PROPH/DIAG IV INF ADDON: CPT

## 2020-07-30 PROCEDURE — 96375 TX/PRO/DX INJ NEW DRUG ADDON: CPT

## 2020-07-30 RX ORDER — FAMOTIDINE 10 MG/ML
20 INJECTION INTRAVENOUS
Status: COMPLETED | OUTPATIENT
Start: 2020-07-30 | End: 2020-07-30

## 2020-07-30 RX ORDER — SODIUM CHLORIDE 0.9 % (FLUSH) 0.9 %
10 SYRINGE (ML) INJECTION
Status: DISCONTINUED | OUTPATIENT
Start: 2020-07-30 | End: 2020-07-30 | Stop reason: HOSPADM

## 2020-07-30 RX ORDER — FAMOTIDINE 10 MG/ML
20 INJECTION INTRAVENOUS
Status: CANCELLED | OUTPATIENT
Start: 2020-08-27

## 2020-07-30 RX ORDER — ACETAMINOPHEN 325 MG/1
650 TABLET ORAL
Status: CANCELLED | OUTPATIENT
Start: 2020-08-27

## 2020-07-30 RX ORDER — ACETAMINOPHEN 325 MG/1
650 TABLET ORAL
Status: COMPLETED | OUTPATIENT
Start: 2020-07-30 | End: 2020-07-30

## 2020-07-30 RX ORDER — HEPARIN 100 UNIT/ML
500 SYRINGE INTRAVENOUS
Status: CANCELLED | OUTPATIENT
Start: 2020-08-27

## 2020-07-30 RX ORDER — SODIUM CHLORIDE 0.9 % (FLUSH) 0.9 %
10 SYRINGE (ML) INJECTION
Status: CANCELLED | OUTPATIENT
Start: 2020-08-27

## 2020-07-30 RX ADMIN — HUMAN IMMUNOGLOBULIN G 85 G: 40 LIQUID INTRAVENOUS at 10:07

## 2020-07-30 RX ADMIN — ACETAMINOPHEN 650 MG: 325 TABLET ORAL at 09:07

## 2020-07-30 RX ADMIN — FAMOTIDINE 20 MG: 10 INJECTION, SOLUTION INTRAVENOUS at 09:07

## 2020-07-30 RX ADMIN — SODIUM CHLORIDE: 0.9 INJECTION, SOLUTION INTRAVENOUS at 09:07

## 2020-07-30 RX ADMIN — DIPHENHYDRAMINE HYDROCHLORIDE 50 MG: 50 INJECTION, SOLUTION INTRAMUSCULAR; INTRAVENOUS at 09:07

## 2020-07-30 NOTE — PROGRESS NOTES
Pt arrived to Infusion suite with Lupus flair, face and upper torso red, with swelling to both eyelids noted, denies SOB, pt saw Dr. FUNEZ yesterday, aware of flair, on steroid daily.    Pt arrived to Infusion suite for 1/2 Privigen 85 gms, received tylenol 650 mg po, pepcid 20 mg ivp, and benadryl 50mg/50cc NS ivpb 30 mins prior to Privigen infusion. Pt states that the last Privigen infusion was in Feb/2020, was changed to other medication, did fine with past Privigen infusions. Started infusion @ 24cc/hr and max rate of 216 cc/hr, tolerated well.

## 2020-07-31 ENCOUNTER — INFUSION (OUTPATIENT)
Dept: INFECTIOUS DISEASES | Facility: HOSPITAL | Age: 60
End: 2020-07-31
Attending: INTERNAL MEDICINE
Payer: COMMERCIAL

## 2020-07-31 VITALS
DIASTOLIC BLOOD PRESSURE: 72 MMHG | RESPIRATION RATE: 18 BRPM | WEIGHT: 185.88 LBS | OXYGEN SATURATION: 99 % | SYSTOLIC BLOOD PRESSURE: 154 MMHG | BODY MASS INDEX: 30.97 KG/M2 | HEIGHT: 65 IN | TEMPERATURE: 99 F | HEART RATE: 108 BPM

## 2020-07-31 DIAGNOSIS — R13.19 ESOPHAGEAL DYSPHAGIA: ICD-10-CM

## 2020-07-31 DIAGNOSIS — M33.13 DERMATOMYOSITIS: Primary | ICD-10-CM

## 2020-07-31 PROCEDURE — 96375 TX/PRO/DX INJ NEW DRUG ADDON: CPT

## 2020-07-31 PROCEDURE — 96366 THER/PROPH/DIAG IV INF ADDON: CPT

## 2020-07-31 PROCEDURE — S0028 INJECTION, FAMOTIDINE, 20 MG: HCPCS | Performed by: INTERNAL MEDICINE

## 2020-07-31 PROCEDURE — 25000003 PHARM REV CODE 250: Performed by: INTERNAL MEDICINE

## 2020-07-31 PROCEDURE — 96365 THER/PROPH/DIAG IV INF INIT: CPT

## 2020-07-31 PROCEDURE — 96367 TX/PROPH/DG ADDL SEQ IV INF: CPT

## 2020-07-31 PROCEDURE — 63600175 PHARM REV CODE 636 W HCPCS: Mod: JG | Performed by: INTERNAL MEDICINE

## 2020-07-31 RX ORDER — HEPARIN 100 UNIT/ML
500 SYRINGE INTRAVENOUS
Status: DISCONTINUED | OUTPATIENT
Start: 2020-07-31 | End: 2020-07-31 | Stop reason: HOSPADM

## 2020-07-31 RX ORDER — FAMOTIDINE 10 MG/ML
20 INJECTION INTRAVENOUS
Status: COMPLETED | OUTPATIENT
Start: 2020-07-31 | End: 2020-07-31

## 2020-07-31 RX ORDER — ACETAMINOPHEN 325 MG/1
650 TABLET ORAL
Status: CANCELLED | OUTPATIENT
Start: 2020-08-27

## 2020-07-31 RX ORDER — ACETAMINOPHEN 325 MG/1
650 TABLET ORAL
Status: COMPLETED | OUTPATIENT
Start: 2020-07-31 | End: 2020-07-31

## 2020-07-31 RX ORDER — SODIUM CHLORIDE 0.9 % (FLUSH) 0.9 %
10 SYRINGE (ML) INJECTION
Status: CANCELLED | OUTPATIENT
Start: 2020-08-27

## 2020-07-31 RX ORDER — HEPARIN 100 UNIT/ML
500 SYRINGE INTRAVENOUS
Status: CANCELLED | OUTPATIENT
Start: 2020-08-27

## 2020-07-31 RX ORDER — SODIUM CHLORIDE 0.9 % (FLUSH) 0.9 %
10 SYRINGE (ML) INJECTION
Status: DISCONTINUED | OUTPATIENT
Start: 2020-07-31 | End: 2020-07-31 | Stop reason: HOSPADM

## 2020-07-31 RX ORDER — FAMOTIDINE 10 MG/ML
20 INJECTION INTRAVENOUS
Status: CANCELLED | OUTPATIENT
Start: 2020-08-27

## 2020-07-31 RX ADMIN — FAMOTIDINE 20 MG: 10 INJECTION, SOLUTION INTRAVENOUS at 08:07

## 2020-07-31 RX ADMIN — DIPHENHYDRAMINE HYDROCHLORIDE 50 MG: 50 INJECTION, SOLUTION INTRAMUSCULAR; INTRAVENOUS at 08:07

## 2020-07-31 RX ADMIN — SODIUM CHLORIDE: 9 INJECTION, SOLUTION INTRAVENOUS at 08:07

## 2020-07-31 RX ADMIN — ACETAMINOPHEN 650 MG: 325 TABLET ORAL at 08:07

## 2020-07-31 RX ADMIN — HUMAN IMMUNOGLOBULIN G 85 G: 40 LIQUID INTRAVENOUS at 08:07

## 2020-07-31 NOTE — PROGRESS NOTES
Pt arrived to Infusion suite with Lupus flair, face and upper torso red, with swelling to both eyelids noted, denies SOB, pt saw Dr. FUNEZ yesterday, aware of flair, on steroid daily.    Pt arrived to Infusion suite for 1/2 Privigen 85 gms, received tylenol 650 mg po, pepcid 20 mg ivp, and benadryl 50mg/50cc NS ivpb 30 mins prior to Privigen infusion. Pt states that the last Privigen infusion was in Feb/2020, was changed to other medication, did fine with past Privigen infusions. Started infusion @ 24cc/hr and max rate of 240 cc/hr, tolerated well.

## 2020-07-31 NOTE — PLAN OF CARE
Patient resting in [facility], [facility].  Education provided on handwashing and infection control..

## 2020-08-10 NOTE — TELEPHONE ENCOUNTER
Initial Xejanz consult completed on 8/10 . Xeljanz will be shipped on  to arrive at patient's home on  via [employer]. $ 0 copay. Patient will start Xeljanz on . Address confirmed, CC on file. Confirmed 2 patient identifiers - name and . Therapy Appropriate.     Patient was counseled on the administration directions for Xeljanz:  -Take 1 tablet (5mg) by mouth twice daily, with or without food  -If dose is missed, skip the missed dose and go back to your normal time.  Do not take 2 doses at the same time or extra doses    Patient was counseled on the following possible side effects, which include, but are not limited to:   -diarrhea, headache, cold like symptoms - runny nose, stuffy nose, sore throat.  Contact provider if allergic reaction, changes in heartbeat, muscle pain or weakness, signs of infection, liver problems - dark urine, fatigue, light-colored stools, yellow skin or eyes.       DDIs:  Medication list reviewed, no DDIs of concern      Patient confirmed understanding. Patient did not have additional questions.  Patient will start Xeljanz on . Consultation included: indication; goals of treatment; administration; storage and handling; side effects; how to handle side effects; the importance of compliance; how to handle missed doses; the importance of laboratory monitoring; the importance of keeping all follow up appointments.  Patient understands to report any medication changes to [facility] and provider. All questions answered and addressed to patients satisfaction. [facility] to contact patient in 3 weeks for refills.     Rosi Barrientos, PharmD  [facility]  [phone]

## 2020-08-13 RX ORDER — POTASSIUM CHLORIDE 600 MG/1
8 TABLET, FILM COATED, EXTENDED RELEASE ORAL DAILY
Qty: 30 TABLET | Refills: 1 | Status: SHIPPED | OUTPATIENT
Start: 2020-08-13 | End: 2020-09-12

## 2020-08-13 RX ORDER — FUROSEMIDE 20 MG/1
20 TABLET ORAL DAILY
Qty: 30 TABLET | Refills: 1 | Status: SHIPPED | OUTPATIENT
Start: 2020-08-13 | End: 2020-10-06

## 2020-08-27 ENCOUNTER — OFFICE VISIT (OUTPATIENT)
Dept: NEUROLOGY | Facility: CLINIC | Age: 60
End: 2020-08-27
Payer: COMMERCIAL

## 2020-08-27 VITALS
BODY MASS INDEX: 30.01 KG/M2 | SYSTOLIC BLOOD PRESSURE: 140 MMHG | DIASTOLIC BLOOD PRESSURE: 73 MMHG | HEART RATE: 96 BPM | HEIGHT: 65 IN | WEIGHT: 180.13 LBS

## 2020-08-27 DIAGNOSIS — M33.13 DERMATOMYOSITIS: ICD-10-CM

## 2020-08-27 DIAGNOSIS — M79.10 MYALGIA: ICD-10-CM

## 2020-08-27 PROCEDURE — 99999 PR PBB SHADOW E&M-EST. PATIENT-LVL IV: CPT | Mod: PBBFAC,,, | Performed by: PSYCHIATRY & NEUROLOGY

## 2020-08-27 PROCEDURE — 3077F PR MOST RECENT SYSTOLIC BLOOD PRESSURE >= 140 MM HG: ICD-10-PCS | Mod: CPTII,S$GLB,, | Performed by: PSYCHIATRY & NEUROLOGY

## 2020-08-27 PROCEDURE — 99204 PR OFFICE/OUTPT VISIT, NEW, LEVL IV, 45-59 MIN: ICD-10-PCS | Mod: S$GLB,,, | Performed by: PSYCHIATRY & NEUROLOGY

## 2020-08-27 PROCEDURE — 3077F SYST BP >= 140 MM HG: CPT | Mod: CPTII,S$GLB,, | Performed by: PSYCHIATRY & NEUROLOGY

## 2020-08-27 PROCEDURE — 3008F BODY MASS INDEX DOCD: CPT | Mod: CPTII,S$GLB,, | Performed by: PSYCHIATRY & NEUROLOGY

## 2020-08-27 PROCEDURE — 99999 PR PBB SHADOW E&M-EST. PATIENT-LVL IV: ICD-10-PCS | Mod: PBBFAC,,, | Performed by: PSYCHIATRY & NEUROLOGY

## 2020-08-27 PROCEDURE — 3078F PR MOST RECENT DIASTOLIC BLOOD PRESSURE < 80 MM HG: ICD-10-PCS | Mod: CPTII,S$GLB,, | Performed by: PSYCHIATRY & NEUROLOGY

## 2020-08-27 PROCEDURE — 3008F PR BODY MASS INDEX (BMI) DOCUMENTED: ICD-10-PCS | Mod: CPTII,S$GLB,, | Performed by: PSYCHIATRY & NEUROLOGY

## 2020-08-27 PROCEDURE — 99204 OFFICE O/P NEW MOD 45 MIN: CPT | Mod: S$GLB,,, | Performed by: PSYCHIATRY & NEUROLOGY

## 2020-08-27 PROCEDURE — 3078F DIAST BP <80 MM HG: CPT | Mod: CPTII,S$GLB,, | Performed by: PSYCHIATRY & NEUROLOGY

## 2020-08-27 NOTE — LETTER
August 27, 2020      Clarissa Swift MD  [address]  [facility] [address]           [facility] - Neurology 7th Fl  [address]  [facility] [address]  Phone: [phone]  Fax: [phone]          Patient: Ermelinda Verde   MR Number: [id number]   YOB: 1960   Date of Visit: 8/27/2020       Dear Dr. Clarissa Swift:    Thank you for referring Ermelinda Verde to me for evaluation. Attached you will find relevant portions of my assessment and plan of care.    If you have questions, please do not hesitate to call me. I look forward to following Ermelinda Verde along with you.    Sincerely,    Felix Patrick MD    Enclosure  CC:  No Recipients    If you would like to receive this communication electronically, please contact [email or web address] or [phone] to request more information on [employer] access.    For providers and/or their staff who would like to refer a patient to [facility], please contact us through our one-stop-shop provider referral line, [facility], at [phone].    If you feel you have received this communication in error or would no longer like to receive these types of communications, please e-mail [email or web address]

## 2020-08-27 NOTE — PROGRESS NOTES
[facility] - NEUROLOGY 7TH [place] [facility], [place]    Date: August 27, 2020   Patient Name: Ermelinda Verde   MRN: [id number]   PCP: Reta Weeks  Referring Provider: Clarissa Swift, *    Assessment:      This is Ermelinda Verde, 59 y.o. female with history of breast cancer and dermatomyositis, myasthenia Ab negative and would not expect such prominent pain with that diagnosis, responding to current treatment.     Plan:      -  Follow up as needed       I discussed side effects of the medications. I asked the patient to  stop the medication if she notices serious adverse effects as we discussed and to seek immediate medical attention at an ER.     Felix Patrick MD  [facility]   Department of Neurology    Subjective:      HPI:   Ms. Ermelinda Verde is a 59 y.o. female who presents with a chief complaint of myalgia    Patient was diagnosed with infiltrating ductal carcinoma January 2017 after noting breast nodule and completed four cycles demi-adjuvant Taxotere and Cytoxan May 2017 followed by mastectomy then four cycles of adjuvant Adriamycin completed September 2017.  She had recurrence October 2018 with initiation of paclitaxel and atezolizumab in Novemeber and shortly after developed proximal arm and leg pain and weakness leading to hospitalization January-February 2019 with diagnosis of dermatomyositis presumed due to underlying malignancy due to +TIF1 gamma positivity, skin biopsy showing vacuolar interface dermatitis consistent with diagnosis.  She has been tried on various agents including steroids, cellcept, MTX, IVIG, Rituximab with either lack of response or inability to tolerate but started tofacitinib a month ago with improvement in pain and strength.    PAST MEDICAL HISTORY:  Past Medical History:   Diagnosis Date    Breast cancer 01/01/2017    left    Depression     Dermatomyositis     Diverticulosis     Gastritis     Hypertension     Vitamin  B12 deficiency 3/8/2018       PAST SURGICAL HISTORY:  Past Surgical History:   Procedure Laterality Date    BREAST BIOPSY Left     BREAST RECONSTRUCTION Left 2017     SECTION      COLONOSCOPY  2011    repeat in 10 yrs.    COLONOSCOPY N/A 2019    Procedure: COLONOSCOPY;  Surgeon: Pernell Rosas MD;  Location: [facility] (4TH FLR);  Service: Endoscopy;  Laterality: N/A;  Patient would like to use her PEG tube for bowel prep and then have her PEG tube removed after EGD and colonoscopy procedures while she is still sedated.    D&C      ESOPHAGOGASTRODUODENOSCOPY N/A 2019    Procedure: EGD (ESOPHAGOGASTRODUODENOSCOPY);  Surgeon: Pernell Rosas MD;  Location: [facility] (2ND FLR);  Service: Endoscopy;  Laterality: N/A;    ESOPHAGOGASTRODUODENOSCOPY N/A 2019    Procedure: EGD (ESOPHAGOGASTRODUODENOSCOPY);  Surgeon: Pernell Rosas MD;  Location: [facility] (4TH FLR);  Service: Endoscopy;  Laterality: N/A;  EGD for follow-up of erosive esophagitis per Dr. Rosas    Patient would like to use her PEG tube for bowel prep and then have her PEG tube removed after EGD and colonoscopy procedures while she is still sedated.    INSERTION OF TUNNELED CENTRAL VENOUS CATHETER (CVC) WITH SUBCUTANEOUS PORT Right 10/31/2018    Procedure: [id number];  Surgeon: Deo Palencia MD;  Location: [facility] 2ND FLR;  Service: General;  Laterality: Right;    MASTECTOMY Left 2017    MYOMECTOMY      PORTACATH PLACEMENT      SENTINEL LYMPH NODE BIOPSY  2017    left    TOTAL REDUCTION MAMMOPLASTY Right 2017    UPPER GASTROINTESTINAL ENDOSCOPY         CURRENT MEDS:  Current Outpatient Medications   Medication Sig Dispense Refill    amLODIPine (NORVASC) 10 MG tablet Take 1 tablet (10 mg total) by mouth once daily. 90 tablet 3    azelastine (ASTELIN) 137 mcg (0.1 %) nasal spray 1 spray (137 mcg total) by Nasal route 2 (two) times daily. 30 mL 2    calcium carbonate (OS-ESTELA) 600 mg calcium (1,500 mg) Tab  Take 600 mg by mouth 2 (two) times daily with meals.      furosemide (LASIX) 20 MG tablet Take 1 tablet (20 mg total) by mouth once daily. 30 tablet 1    potassium chloride (KLOR-CON) 8 MEQ TbSR Take 1 tablet (8 mEq total) by mouth once daily. 30 tablet 1    predniSONE (DELTASONE) 20 MG tablet       tofacitinib (XELJANZ) 5 mg Tab Take 5 mg by mouth 2 (two) times daily. 60 tablet 11    triamcinolone acetonide 0.025% (KENALOG) 0.025 % cream AAA on face BID x 1-2 wks then prn flares only 80 g 3    vitamin D (VITAMIN D3) 1000 units Tab Take 1,000 Units by mouth once daily.      clobetasoL (TEMOVATE) 0.05 % cream Apply topically 2 (two) times daily. 60 g 2     No current facility-administered medications for this visit.        ALLERGIES:  Review of patient's allergies indicates:  No Known Allergies    FAMILY HISTORY:  Family History   Problem Relation Age of Onset    Heart disease Father     Hypertension Father     Breast cancer Sister 52    Hypertension Mother     No Known Problems Brother     Thyroid disease Daughter         hyperthyroidism s/p thyroidectomy    No Known Problems Son     No Known Problems Brother     No Known Problems Brother     No Known Problems Sister     No Known Problems Daughter     Colon cancer Neg Hx     Ovarian cancer Neg Hx     Celiac disease Neg Hx     Cirrhosis Neg Hx     Colon polyps Neg Hx     Esophageal cancer Neg Hx     Inflammatory bowel disease Neg Hx     Liver cancer Neg Hx     Liver disease Neg Hx     Rectal cancer Neg Hx     Stomach cancer Neg Hx     Ulcerative colitis Neg Hx     Melanoma Neg Hx        SOCIAL HISTORY:  Social History     Tobacco Use    Smoking status: Never Smoker    Smokeless tobacco: Never Used   Substance Use Topics    Alcohol use: Yes     Frequency: Never     Drinks per session: Patient refused     Binge frequency: Never     Comment: rare    Drug use: No       Review of Systems:  12 review of systems is negative except for the  "symptoms mentioned in HPI.        Objective:     Vitals:    08/27/20 1050   BP: (!) 140/73   Pulse: 96   Weight: 81.7 kg (180 lb 1.9 oz)   Height: 5' 5" (1.651 m)       General: NAD, well nourished   Eyes: no tearing, discharge, no erythema   ENT: moist mucous membranes of the oral cavity, nares patent    Neck: Supple, full range of motion  Cardiovascular: Warm and well perfused, pulses equal and symmetrical  Lungs: Normal work of breathing, normal chest wall excursions  Skin: + gottron papules  Psychiatry: Mood and affect are appropriate   Abdomen: soft, non tender, non distended  Extremeties: No cyanosis, clubbing     Neurological   MENTAL STATUS: Alert and oriented to person, place, and time. Attention and concentration within normal limits. Speech without dysarthria, able to name and repeat without difficulty. Recent and remote memory within normal limits   CRANIAL NERVES: Visual fields intact. PERRL. EOMI. Facial sensation intact. Face symmetrical. Hearing grossly intact. Full shoulder shrug bilaterally. Tongue protrudes midline   SENSORY: Sensation is intact to light touch throughout.    MOTOR: Normal bulk and tone. No pronator drift.  4/5 proximal and 5/5 distal in all extremities    REFLEXES: BR and patella 2+, remainder quiet  CEREBELLAR/COORDINATION/GAIT: Gait cautious and mildly unsteady.  Finger to nose intact.  "

## 2020-08-31 ENCOUNTER — TELEPHONE (OUTPATIENT)
Dept: INFECTIOUS DISEASES | Facility: HOSPITAL | Age: 60
End: 2020-08-31

## 2020-09-01 ENCOUNTER — INFUSION (OUTPATIENT)
Dept: INFECTIOUS DISEASES | Facility: HOSPITAL | Age: 60
End: 2020-09-01
Attending: INTERNAL MEDICINE
Payer: COMMERCIAL

## 2020-09-01 VITALS
DIASTOLIC BLOOD PRESSURE: 81 MMHG | OXYGEN SATURATION: 97 % | HEART RATE: 97 BPM | TEMPERATURE: 99 F | BODY MASS INDEX: 29.99 KG/M2 | RESPIRATION RATE: 18 BRPM | HEIGHT: 65 IN | SYSTOLIC BLOOD PRESSURE: 148 MMHG | WEIGHT: 180 LBS

## 2020-09-01 DIAGNOSIS — M33.13 DERMATOMYOSITIS: Primary | ICD-10-CM

## 2020-09-01 DIAGNOSIS — R13.19 ESOPHAGEAL DYSPHAGIA: ICD-10-CM

## 2020-09-01 PROCEDURE — 96366 THER/PROPH/DIAG IV INF ADDON: CPT

## 2020-09-01 PROCEDURE — S0028 INJECTION, FAMOTIDINE, 20 MG: HCPCS | Performed by: INTERNAL MEDICINE

## 2020-09-01 PROCEDURE — 25000003 PHARM REV CODE 250: Performed by: INTERNAL MEDICINE

## 2020-09-01 PROCEDURE — 96375 TX/PRO/DX INJ NEW DRUG ADDON: CPT

## 2020-09-01 PROCEDURE — 96365 THER/PROPH/DIAG IV INF INIT: CPT

## 2020-09-01 PROCEDURE — 96367 TX/PROPH/DG ADDL SEQ IV INF: CPT

## 2020-09-01 PROCEDURE — 63600175 PHARM REV CODE 636 W HCPCS: Performed by: INTERNAL MEDICINE

## 2020-09-01 RX ORDER — SODIUM CHLORIDE 0.9 % (FLUSH) 0.9 %
10 SYRINGE (ML) INJECTION
Status: DISCONTINUED | OUTPATIENT
Start: 2020-09-01 | End: 2020-09-01 | Stop reason: HOSPADM

## 2020-09-01 RX ORDER — HEPARIN 100 UNIT/ML
500 SYRINGE INTRAVENOUS
Status: CANCELLED | OUTPATIENT
Start: 2020-09-22

## 2020-09-01 RX ORDER — HEPARIN 100 UNIT/ML
500 SYRINGE INTRAVENOUS
Status: DISCONTINUED | OUTPATIENT
Start: 2020-09-01 | End: 2020-09-01 | Stop reason: HOSPADM

## 2020-09-01 RX ORDER — FAMOTIDINE 10 MG/ML
20 INJECTION INTRAVENOUS
Status: COMPLETED | OUTPATIENT
Start: 2020-09-01 | End: 2020-09-01

## 2020-09-01 RX ORDER — FAMOTIDINE 10 MG/ML
20 INJECTION INTRAVENOUS
Status: CANCELLED | OUTPATIENT
Start: 2020-09-22

## 2020-09-01 RX ORDER — SODIUM CHLORIDE 0.9 % (FLUSH) 0.9 %
10 SYRINGE (ML) INJECTION
Status: CANCELLED | OUTPATIENT
Start: 2020-09-22

## 2020-09-01 RX ORDER — ACETAMINOPHEN 325 MG/1
650 TABLET ORAL
Status: CANCELLED | OUTPATIENT
Start: 2020-09-22

## 2020-09-01 RX ORDER — ACETAMINOPHEN 325 MG/1
650 TABLET ORAL
Status: COMPLETED | OUTPATIENT
Start: 2020-09-01 | End: 2020-09-01

## 2020-09-01 RX ADMIN — FAMOTIDINE 20 MG: 10 INJECTION INTRAVENOUS at 09:09

## 2020-09-01 RX ADMIN — DIPHENHYDRAMINE HYDROCHLORIDE 50 MG: 50 INJECTION, SOLUTION INTRAMUSCULAR; INTRAVENOUS at 09:09

## 2020-09-01 RX ADMIN — HUMAN IMMUNOGLOBULIN G 80 G: 40 LIQUID INTRAVENOUS at 10:09

## 2020-09-01 RX ADMIN — ACETAMINOPHEN 650 MG: 325 TABLET ORAL at 09:09

## 2020-09-01 RX ADMIN — SODIUM CHLORIDE: 9 INJECTION, SOLUTION INTRAVENOUS at 09:09

## 2020-09-01 NOTE — PROGRESS NOTES
Pt arrived to Infusion suite with Lupus flair, face and upper torso red, with swelling to both eyelids noted, ashia SOB, Dr. Madera aware. Patient stated she has been on new  medication, Xeljanz, for approximately three weeks    Pt arrived to Infusion suite for 1/2 Privigen 80 gms, Premedications of Tylenol 650 mg po, pepcid 20 mg IVP, and benadryl 50 mg IVPB given 30 minutes prior to infusion.   NS @ 25 ml/hr ran concurrently with Privigen  Started infusion @ 24cc/hr and increased every 15 minutes per  protocol to a max rate of 240 cc/hr, tolerated infusion well and left in NAD.

## 2020-09-02 ENCOUNTER — INFUSION (OUTPATIENT)
Dept: INFECTIOUS DISEASES | Facility: HOSPITAL | Age: 60
End: 2020-09-02
Attending: INTERNAL MEDICINE
Payer: COMMERCIAL

## 2020-09-02 VITALS
DIASTOLIC BLOOD PRESSURE: 86 MMHG | RESPIRATION RATE: 18 BRPM | HEIGHT: 65 IN | BODY MASS INDEX: 29.79 KG/M2 | OXYGEN SATURATION: 99 % | TEMPERATURE: 98 F | SYSTOLIC BLOOD PRESSURE: 150 MMHG | WEIGHT: 178.81 LBS | HEART RATE: 86 BPM

## 2020-09-02 DIAGNOSIS — M33.13 DERMATOMYOSITIS: Primary | ICD-10-CM

## 2020-09-02 DIAGNOSIS — R13.19 ESOPHAGEAL DYSPHAGIA: ICD-10-CM

## 2020-09-02 PROCEDURE — 96365 THER/PROPH/DIAG IV INF INIT: CPT

## 2020-09-02 PROCEDURE — 63600175 PHARM REV CODE 636 W HCPCS: Mod: JG | Performed by: INTERNAL MEDICINE

## 2020-09-02 PROCEDURE — 25000003 PHARM REV CODE 250: Performed by: INTERNAL MEDICINE

## 2020-09-02 PROCEDURE — 96375 TX/PRO/DX INJ NEW DRUG ADDON: CPT

## 2020-09-02 PROCEDURE — 96366 THER/PROPH/DIAG IV INF ADDON: CPT

## 2020-09-02 PROCEDURE — 96367 TX/PROPH/DG ADDL SEQ IV INF: CPT

## 2020-09-02 PROCEDURE — S0028 INJECTION, FAMOTIDINE, 20 MG: HCPCS | Performed by: INTERNAL MEDICINE

## 2020-09-02 RX ORDER — ACETAMINOPHEN 325 MG/1
650 TABLET ORAL
Status: COMPLETED | OUTPATIENT
Start: 2020-09-02 | End: 2020-09-02

## 2020-09-02 RX ORDER — HEPARIN 100 UNIT/ML
500 SYRINGE INTRAVENOUS
Status: CANCELLED | OUTPATIENT
Start: 2020-09-29

## 2020-09-02 RX ORDER — FAMOTIDINE 10 MG/ML
20 INJECTION INTRAVENOUS
Status: COMPLETED | OUTPATIENT
Start: 2020-09-02 | End: 2020-09-02

## 2020-09-02 RX ORDER — SODIUM CHLORIDE 0.9 % (FLUSH) 0.9 %
10 SYRINGE (ML) INJECTION
Status: CANCELLED | OUTPATIENT
Start: 2020-09-29

## 2020-09-02 RX ORDER — ACETAMINOPHEN 325 MG/1
650 TABLET ORAL
Status: CANCELLED | OUTPATIENT
Start: 2020-09-29

## 2020-09-02 RX ORDER — HEPARIN 100 UNIT/ML
500 SYRINGE INTRAVENOUS
Status: DISCONTINUED | OUTPATIENT
Start: 2020-09-02 | End: 2020-09-02 | Stop reason: HOSPADM

## 2020-09-02 RX ORDER — FAMOTIDINE 10 MG/ML
20 INJECTION INTRAVENOUS
Status: CANCELLED | OUTPATIENT
Start: 2020-09-29

## 2020-09-02 RX ORDER — SODIUM CHLORIDE 0.9 % (FLUSH) 0.9 %
10 SYRINGE (ML) INJECTION
Status: DISCONTINUED | OUTPATIENT
Start: 2020-09-02 | End: 2020-09-02 | Stop reason: HOSPADM

## 2020-09-02 RX ADMIN — ACETAMINOPHEN 650 MG: 325 TABLET ORAL at 09:09

## 2020-09-02 RX ADMIN — DIPHENHYDRAMINE HYDROCHLORIDE 50 MG: 50 INJECTION, SOLUTION INTRAMUSCULAR; INTRAVENOUS at 09:09

## 2020-09-02 RX ADMIN — FAMOTIDINE 20 MG: 10 INJECTION INTRAVENOUS at 09:09

## 2020-09-02 RX ADMIN — SODIUM CHLORIDE: 0.9 INJECTION, SOLUTION INTRAVENOUS at 09:09

## 2020-09-02 RX ADMIN — HUMAN IMMUNOGLOBULIN G 80 G: 40 LIQUID INTRAVENOUS at 09:09

## 2020-09-03 ENCOUNTER — PATIENT OUTREACH (OUTPATIENT)
Dept: ADMINISTRATIVE | Facility: OTHER | Age: 60
End: 2020-09-03

## 2020-09-04 ENCOUNTER — OFFICE VISIT (OUTPATIENT)
Dept: OPTOMETRY | Facility: CLINIC | Age: 60
End: 2020-09-04
Payer: COMMERCIAL

## 2020-09-04 ENCOUNTER — CLINICAL SUPPORT (OUTPATIENT)
Dept: OPHTHALMOLOGY | Facility: CLINIC | Age: 60
End: 2020-09-04
Payer: COMMERCIAL

## 2020-09-04 DIAGNOSIS — M33.13 DERMATOMYOSITIS: ICD-10-CM

## 2020-09-04 DIAGNOSIS — Z79.899 ENCOUNTER FOR LONG-TERM (CURRENT) USE OF HIGH-RISK MEDICATION: Primary | ICD-10-CM

## 2020-09-04 DIAGNOSIS — H25.13 NUCLEAR SCLEROSIS, BILATERAL: ICD-10-CM

## 2020-09-04 PROCEDURE — 99999 PR PBB SHADOW E&M-EST. PATIENT-LVL III: CPT | Mod: PBBFAC,,, | Performed by: OPTOMETRIST

## 2020-09-04 PROCEDURE — 92134 OCT, RETINA - OU - BOTH EYES: ICD-10-PCS | Mod: S$GLB,,, | Performed by: OPTOMETRIST

## 2020-09-04 PROCEDURE — 92134 CPTRZ OPH DX IMG PST SGM RTA: CPT | Mod: S$GLB,,, | Performed by: OPTOMETRIST

## 2020-09-04 PROCEDURE — 92083 EXTENDED VISUAL FIELD XM: CPT | Mod: S$GLB,,, | Performed by: OPTOMETRIST

## 2020-09-04 PROCEDURE — 92004 PR EYE EXAM, NEW PATIENT,COMPREHESV: ICD-10-PCS | Mod: S$GLB,,, | Performed by: OPTOMETRIST

## 2020-09-04 PROCEDURE — 92083 HUMPHREY VISUAL FIELD - OU - BOTH EYES: ICD-10-PCS | Mod: S$GLB,,, | Performed by: OPTOMETRIST

## 2020-09-04 PROCEDURE — 92004 COMPRE OPH EXAM NEW PT 1/>: CPT | Mod: S$GLB,,, | Performed by: OPTOMETRIST

## 2020-09-04 PROCEDURE — 99999 PR PBB SHADOW E&M-EST. PATIENT-LVL III: ICD-10-PCS | Mod: PBBFAC,,, | Performed by: OPTOMETRIST

## 2020-09-04 NOTE — PROGRESS NOTES
HPI     DLE:  April 2019 [facility]  Plaquenil stopped 2/2 limited Benefit so stopped since making appointment    No eye surgery   No eyedrops    Pt here for check of ocular health.  Pt has been off of plaquenil for 3   weeks now.  Pt states flashes and floaters.  Pt states she has itching OU   x few weeks ago and has gotten a little better.  Pt denies headaches or   eye pain. Pt denies burning or tearing. Pt would like her eyeglasses   checked to see if she needs a new prescription.     Last edited by Jin Loera, OD on 9/4/2020  9:43 AM. (History)            Assessment /Plan     For exam results, see Encounter Report.    Encounter for long-term (current) use of high-risk medication  -     Vang Visual Field - OU - Extended - Both Eyes  -     OCT, Retina - OU - Both Eyes  Dermatomyositis  -     Vang Visual Field - OU - Extended - Both Eyes  -     OCT, Retina - OU - Both Eyes  -No retinopathy noted today. No annual F/U necessary since therapy stopped    Nuclear sclerosis, bilateral  -Educated patient on presence of cataracts at today's exam, monitor at annual dilated fundus exam. 8+ years surgical estimate.      RTC 1 yr

## 2020-09-04 NOTE — PROGRESS NOTES
OCT DONE OU     10-2 SS PLAQ DONE OU     REL & FIX =  GOOD OD                         POOR  OS        COOP =   GOOD     PATIENT DENIES ANY ALLERGIES TO LATEX/ADHESIVES AT THIS TIME  PATIENT HAD A LOT OF FIXATION LOSS WITH OS STARTED TEST OVER   1X STILL HAD SAME RESULTS WITH OS PATIENT WAS FOLLOWING THE   FLASHING LIGHTS WITH OS     JTHOMAS    NO MRX     USED AGE CORRECTION LENS FOR TEST    +3.25  OD   +3.25  OS

## 2020-09-08 ENCOUNTER — TELEPHONE (OUTPATIENT)
Dept: PHARMACY | Facility: CLINIC | Age: 60
End: 2020-09-08

## 2020-09-14 ENCOUNTER — OFFICE VISIT (OUTPATIENT)
Dept: OTOLARYNGOLOGY | Facility: CLINIC | Age: 60
End: 2020-09-14
Payer: COMMERCIAL

## 2020-09-14 ENCOUNTER — CLINICAL SUPPORT (OUTPATIENT)
Dept: AUDIOLOGY | Facility: CLINIC | Age: 60
End: 2020-09-14
Payer: COMMERCIAL

## 2020-09-14 VITALS — HEART RATE: 91 BPM | SYSTOLIC BLOOD PRESSURE: 139 MMHG | DIASTOLIC BLOOD PRESSURE: 81 MMHG

## 2020-09-14 DIAGNOSIS — J34.3 HYPERTROPHY OF BOTH INFERIOR NASAL TURBINATES: ICD-10-CM

## 2020-09-14 DIAGNOSIS — H61.22 IMPACTED CERUMEN OF LEFT EAR: ICD-10-CM

## 2020-09-14 DIAGNOSIS — H93.13 TINNITUS, BILATERAL: ICD-10-CM

## 2020-09-14 DIAGNOSIS — R68.89 THROAT CONGESTION: Primary | ICD-10-CM

## 2020-09-14 DIAGNOSIS — H91.93 HIGH FREQUENCY HEARING LOSS OF BOTH EARS: Primary | ICD-10-CM

## 2020-09-14 DIAGNOSIS — H90.3 HEARING LOSS, SENSORINEURAL, HIGH FREQUENCY, BILATERAL: ICD-10-CM

## 2020-09-14 DIAGNOSIS — R09.82 POST-NASAL DRIP: ICD-10-CM

## 2020-09-14 DIAGNOSIS — H61.22 IMPACTED CERUMEN, LEFT EAR: ICD-10-CM

## 2020-09-14 PROCEDURE — 3079F PR MOST RECENT DIASTOLIC BLOOD PRESSURE 80-89 MM HG: ICD-10-PCS | Mod: CPTII,S$GLB,, | Performed by: OTOLARYNGOLOGY

## 2020-09-14 PROCEDURE — 99203 PR OFFICE/OUTPT VISIT, NEW, LEVL III, 30-44 MIN: ICD-10-PCS | Mod: 25,S$GLB,, | Performed by: OTOLARYNGOLOGY

## 2020-09-14 PROCEDURE — 92557 COMPREHENSIVE HEARING TEST: CPT | Mod: S$GLB,,, | Performed by: AUDIOLOGIST

## 2020-09-14 PROCEDURE — 99999 PR PBB SHADOW E&M-EST. PATIENT-LVL I: ICD-10-PCS | Mod: PBBFAC,,,

## 2020-09-14 PROCEDURE — 3075F SYST BP GE 130 - 139MM HG: CPT | Mod: CPTII,S$GLB,, | Performed by: OTOLARYNGOLOGY

## 2020-09-14 PROCEDURE — 99999 PR PBB SHADOW E&M-EST. PATIENT-LVL IV: CPT | Mod: PBBFAC,,, | Performed by: OTOLARYNGOLOGY

## 2020-09-14 PROCEDURE — 3079F DIAST BP 80-89 MM HG: CPT | Mod: CPTII,S$GLB,, | Performed by: OTOLARYNGOLOGY

## 2020-09-14 PROCEDURE — 99999 PR PBB SHADOW E&M-EST. PATIENT-LVL IV: ICD-10-PCS | Mod: PBBFAC,,, | Performed by: OTOLARYNGOLOGY

## 2020-09-14 PROCEDURE — 69210 PR REMOVAL IMPACTED CERUMEN REQUIRING INSTRUMENTATION, UNILATERAL: ICD-10-PCS | Mod: S$GLB,,, | Performed by: OTOLARYNGOLOGY

## 2020-09-14 PROCEDURE — 92567 PR TYMPA2METRY: ICD-10-PCS | Mod: S$GLB,,, | Performed by: AUDIOLOGIST

## 2020-09-14 PROCEDURE — 3075F PR MOST RECENT SYSTOLIC BLOOD PRESS GE 130-139MM HG: ICD-10-PCS | Mod: CPTII,S$GLB,, | Performed by: OTOLARYNGOLOGY

## 2020-09-14 PROCEDURE — 92567 TYMPANOMETRY: CPT | Mod: S$GLB,,, | Performed by: AUDIOLOGIST

## 2020-09-14 PROCEDURE — 99203 OFFICE O/P NEW LOW 30 MIN: CPT | Mod: 25,S$GLB,, | Performed by: OTOLARYNGOLOGY

## 2020-09-14 PROCEDURE — 99999 PR PBB SHADOW E&M-EST. PATIENT-LVL I: CPT | Mod: PBBFAC,,,

## 2020-09-14 PROCEDURE — 69210 REMOVE IMPACTED EAR WAX UNI: CPT | Mod: S$GLB,,, | Performed by: OTOLARYNGOLOGY

## 2020-09-14 PROCEDURE — 92557 PR COMPREHENSIVE HEARING TEST: ICD-10-PCS | Mod: S$GLB,,, | Performed by: AUDIOLOGIST

## 2020-09-14 NOTE — PROGRESS NOTES
Ermelinda Verde was seen today for a hearing evaluation.     Pure tone audiometry revealed a mild high frequency hearing loss, AU  SRT and PTA are in good agreement bilaterally.  Excellent speech discrimination for the right ear: Excellent  speech discrimination for the left ear   Tympanometry revealed Type A for the right ear, Type A for the left ear    Recommendations:  1. Otologic Evaluation  2. Annual Audiogram or repeat audiogram as needed  3. Hearing Protection

## 2020-09-14 NOTE — PATIENT INSTRUCTIONS
Piece of cerumen removed from deep AS eac   Audiometry reviewed  Pt. is a marginal candidate for amplification  Monitor hearing yearly  Tinnitus literature provided  Trial of NSS ( otc Flonase/fluticasone); 1-2 sprays @ lateral nostril once a day may help  Consider office endoscopy for complete exam prn   Continue use of AYR nasal mist prn  Consider addition of otc Mucinex BID to thin secretions  Hydration encouraged  May try nasal rinses with distilled water prn; instruction sheets provided

## 2020-09-14 NOTE — PROGRESS NOTES
"Subjective:       Patient ID: Ermelinda Verde is a 59 y.o. female.    Chief Complaint: Tinnitus    HPI: Ms. Verde is a 59-year-old  female with a history of autoimmune dermato and polymyositis treated with Zelganz twice daily at this point.  She is followed by rheumatology service here.  She is also status post treatment for left breast cancer having completed 4 cycles of neoadjuvant Taxotere and Cytoxan then subsequent mastectomy 06/26/2017 and then 4 cycles of adjuvant Adriamycin completed in September 2017.  She recurrence of left supraclavicular lymphadenopathy confirmed as recurrence of previously treated breast cancer and she was started on Atelizumab/Abraxane in late 2018/early 2019.  She has been treated subsequently with prednisone courses, Solu-Medrol and methotrexate with folic acid.  The methotrexate was discontinued in February 2018.Her sed rate measured 53, one month ago.  Her chief complaint is ringing perception in her ears x months.  The tinnitus erception is worse in quiet environments.  It is described as constant high-pitched like"cicadas".  She cannot remember any previous audiometric testing prior to today's study.  She also complains of viscous mucus collecting in her throat draining from her nasopharynx or sinuses.  She has been using AYR saline mist on a p.r.n. basis.    The mucus releases when she showers and exposed to steam.  She indicates dry viscous mucus collecting her throat which is bothersome.    She denies khai GERD symptoms.  She swallows a mucus frequently during the night.  It will not move and she often has to get up and expectorate thick mucus from her throat at night.    Past Medical History:   Diagnosis Date    Breast cancer 01/01/2017    left    Depression     Dermatomyositis     Diverticulosis     Gastritis     Hypertension     Vitamin B12 deficiency 3/8/2018     Past Surgical History:   Procedure Laterality Date    BREAST BIOPSY Left     " BREAST RECONSTRUCTION Left 2017     SECTION      COLONOSCOPY  2011    repeat in 10 yrs.    COLONOSCOPY N/A 2019    Procedure: COLONOSCOPY;  Surgeon: Pernell Rosas MD;  Location: [facility] (4TH FLR);  Service: Endoscopy;  Laterality: N/A;  Patient would like to use her PEG tube for bowel prep and then have her PEG tube removed after EGD and colonoscopy procedures while she is still sedated.    D&C      ESOPHAGOGASTRODUODENOSCOPY N/A 2019    Procedure: EGD (ESOPHAGOGASTRODUODENOSCOPY);  Surgeon: Pernell Rosas MD;  Location: [facility] ENDO (2ND FLR);  Service: Endoscopy;  Laterality: N/A;    ESOPHAGOGASTRODUODENOSCOPY N/A 2019    Procedure: EGD (ESOPHAGOGASTRODUODENOSCOPY);  Surgeon: Pernell Rosas MD;  Location: [facility] ENDO (4TH FLR);  Service: Endoscopy;  Laterality: N/A;  EGD for follow-up of erosive esophagitis per Dr. Rosas    Patient would like to use her PEG tube for bowel prep and then have her PEG tube removed after EGD and colonoscopy procedures while she is still sedated.    INSERTION OF TUNNELED CENTRAL VENOUS CATHETER (CVC) WITH SUBCUTANEOUS PORT Right 10/31/2018    Procedure: [id number];  Surgeon: Deo Palencia MD;  Location: [facility] OR 2ND FLR;  Service: General;  Laterality: Right;    MASTECTOMY Left 2017    MYOMECTOMY      PORTACATH PLACEMENT      SENTINEL LYMPH NODE BIOPSY  2017    left    TOTAL REDUCTION MAMMOPLASTY Right 2017    UPPER GASTROINTESTINAL ENDOSCOPY       Current Outpatient Medications on File Prior to Visit   Medication Sig Dispense Refill    amLODIPine (NORVASC) 10 MG tablet Take 1 tablet (10 mg total) by mouth once daily. 90 tablet 3    azelastine (ASTELIN) 137 mcg (0.1 %) nasal spray 1 spray (137 mcg total) by Nasal route 2 (two) times daily. 30 mL 2    calcium carbonate (OS-ESTELA) 600 mg calcium (1,500 mg) Tab Take 600 mg by mouth 2 (two) times daily with meals.      predniSONE (DELTASONE) 20 MG tablet       tofacitinib (XELJANZ) 5 mg  Tab Take 5 mg by mouth 2 (two) times daily. 60 tablet 11    triamcinolone acetonide 0.025% (KENALOG) 0.025 % cream AAA on face BID x 1-2 wks then prn flares only 80 g 3    vitamin D (VITAMIN D3) 1000 units Tab Take 1,000 Units by mouth once daily.      clobetasoL (TEMOVATE) 0.05 % cream Apply topically 2 (two) times daily. 60 g 2    furosemide (LASIX) 20 MG tablet Take 1 tablet (20 mg total) by mouth once daily. (Patient not taking: Reported on 9/4/2020) 30 tablet 1    [DISCONTINUED] mycophenolate (CELLCEPT) 500 mg Tab Take 1 tablet (500 mg total) by mouth once daily. 30 tablet 3     No current facility-administered medications on file prior to visit.            Review of Systems      The patient completed an audiometric study performed by the [facility] audiology service today.  The study is duplicated below the results are reviewed with her in detail.  Objective:           General:  Alert and oriented lady in no acute distress  Both ears were examined under the microscope in the micro procedure room.  Procedure:  A deeply imbedded dry cerumen impactions removed from the surface of the left TM with micro forceps  Physical Exam  Constitutional:       Appearance: She is well-developed.   HENT:      Head: Normocephalic.      Jaw: No trismus.      Right Ear: Tympanic membrane and external ear normal. No decreased hearing noted. No drainage. No foreign body. No mastoid tenderness. Tympanic membrane is not perforated.      Left Ear: Tympanic membrane and external ear normal. No decreased hearing noted. No drainage. No foreign body. No mastoid tenderness. Tympanic membrane is not perforated.      Ears:        Nose: Nose normal. No nasal deformity or septal deviation.      Right Sinus: No maxillary sinus tenderness or frontal sinus tenderness.      Left Sinus: No maxillary sinus tenderness or frontal sinus tenderness.        Mouth/Throat:      Mouth: No oral lesions.      Pharynx: Uvula midline. No oropharyngeal exudate or  uvula swelling.      Tonsils: No tonsillar abscesses.     Neck:      Musculoskeletal: Neck supple.      Thyroid: No thyromegaly.      Trachea: No tracheal deviation.   Pulmonary:      Effort: Pulmonary effort is normal.      Breath sounds: No stridor.   Lymphadenopathy:      Cervical: No cervical adenopathy.   Skin:     Findings: No rash.   Neurological:      Mental Status: She is alert and oriented to person, place, and time.         Assessment:       1. Throat congestion ; feeling of viscous mucous collecting in throat/from nasopharynx   2. Post-nasal drip    3. Hypertrophy of both inferior nasal turbinates    4. Impacted cerumen of left ear    5. Tinnitus, bilateral    6. Hearing loss, sensorineural, high frequency, bilateral     7.     Hx auto-immune myositis Rx Xeljanz  8.      Hx breat CA s/p surgery/chemoRx   9.      Geographic tongue  Plan:     Audiometry reviewed  Pt. is a marginal candidate for amplification  Monitor hearing yearly  Tinnitus literature provided  Trial of NSS ( otc Flonase/fluticasone); 1-2 sprays @ lateral nostril once a day may help  Consider office endoscopy for complete exam prn   Continue use of AYR nasal mist prn  Consider addition of otc Mucinex BID to thin secretions  Hydration encouraged

## 2020-09-17 ENCOUNTER — PATIENT MESSAGE (OUTPATIENT)
Dept: RHEUMATOLOGY | Facility: CLINIC | Age: 60
End: 2020-09-17

## 2020-09-23 ENCOUNTER — OFFICE VISIT (OUTPATIENT)
Dept: RHEUMATOLOGY | Facility: CLINIC | Age: 60
End: 2020-09-23
Payer: COMMERCIAL

## 2020-09-23 VITALS
HEART RATE: 109 BPM | DIASTOLIC BLOOD PRESSURE: 70 MMHG | WEIGHT: 180.75 LBS | HEIGHT: 65 IN | BODY MASS INDEX: 30.12 KG/M2 | SYSTOLIC BLOOD PRESSURE: 130 MMHG

## 2020-09-23 DIAGNOSIS — J32.9 SINUSITIS, UNSPECIFIED CHRONICITY, UNSPECIFIED LOCATION: ICD-10-CM

## 2020-09-23 DIAGNOSIS — D70.2 OTHER DRUG-INDUCED NEUTROPENIA: Primary | ICD-10-CM

## 2020-09-23 PROCEDURE — 3008F BODY MASS INDEX DOCD: CPT | Mod: CPTII,S$GLB,, | Performed by: INTERNAL MEDICINE

## 2020-09-23 PROCEDURE — 3078F DIAST BP <80 MM HG: CPT | Mod: CPTII,S$GLB,, | Performed by: INTERNAL MEDICINE

## 2020-09-23 PROCEDURE — 3075F PR MOST RECENT SYSTOLIC BLOOD PRESS GE 130-139MM HG: ICD-10-PCS | Mod: CPTII,S$GLB,, | Performed by: INTERNAL MEDICINE

## 2020-09-23 PROCEDURE — 99999 PR PBB SHADOW E&M-EST. PATIENT-LVL III: CPT | Mod: PBBFAC,,, | Performed by: INTERNAL MEDICINE

## 2020-09-23 PROCEDURE — 3078F PR MOST RECENT DIASTOLIC BLOOD PRESSURE < 80 MM HG: ICD-10-PCS | Mod: CPTII,S$GLB,, | Performed by: INTERNAL MEDICINE

## 2020-09-23 PROCEDURE — 99999 PR PBB SHADOW E&M-EST. PATIENT-LVL III: ICD-10-PCS | Mod: PBBFAC,,, | Performed by: INTERNAL MEDICINE

## 2020-09-23 PROCEDURE — 3008F PR BODY MASS INDEX (BMI) DOCUMENTED: ICD-10-PCS | Mod: CPTII,S$GLB,, | Performed by: INTERNAL MEDICINE

## 2020-09-23 PROCEDURE — 99214 OFFICE O/P EST MOD 30 MIN: CPT | Mod: S$GLB,,, | Performed by: INTERNAL MEDICINE

## 2020-09-23 PROCEDURE — 99214 PR OFFICE/OUTPT VISIT, EST, LEVL IV, 30-39 MIN: ICD-10-PCS | Mod: S$GLB,,, | Performed by: INTERNAL MEDICINE

## 2020-09-23 PROCEDURE — 3075F SYST BP GE 130 - 139MM HG: CPT | Mod: CPTII,S$GLB,, | Performed by: INTERNAL MEDICINE

## 2020-09-23 RX ORDER — PREDNISONE 1 MG/1
TABLET ORAL
Qty: 60 TABLET | Refills: 2 | Status: SHIPPED | OUTPATIENT
Start: 2020-09-23 | End: 2020-11-23

## 2020-09-23 RX ORDER — AMOXICILLIN 875 MG/1
875 TABLET, FILM COATED ORAL EVERY 12 HOURS
Qty: 14 TABLET | Refills: 0 | Status: SHIPPED | OUTPATIENT
Start: 2020-09-23 | End: 2020-09-30

## 2020-09-23 NOTE — PROGRESS NOTES
RHEUMATOLOGY CLINIC FOLLOW UP VISIT  Chief complaints:-  To follow up for Myositis follow up     HPI:-  Ermelinda Javed a 59 y.o. pleasant female  with history of L sided infiltrating ductal carcinoma of the L breast (dx on Jan 2017), HTN, depression, gastritis, and recently diagnosed myositis (uncertain if it's autoimmune vs medication induced), present today with concern about myositis flare     Patient was initially send from hem/onc clinic for evaluation of possible inflammatory myositis.  Patient was hospitalized from 1/22/19 till 2/13/19 for myositis.      L breast cancer s/p completion of 4 cycles of demi-adjuvant taxotere and cytoxan (completed on 5/9/17) and mastectomy (6/26/17) and then 4 cycles of adjuvant Adriamycin (completed 9/12/17). In 10/2017 - patient developed L supraclavicular lymphadenopathy which FNA confirmed as reoccurrence of previously treated breast cancer.      Patient started on Atelizumab/Abraxane on 11/7/18. Per chart review, patient noticed rashes on the dorsum of her hands on 11/11/18. Seen in urgent care and given topical steroid cream. Then received two more infusions on Atelizumab/Abraxane on 11/21/18 and 12/5/18. Started to notice puffiness around the eyes on 12/11/18. Received another Abraxane infusion on 12/12/18 but this time with hydrocortisone 50 mg which helped reduce the swelling around the eyes. Developed swelling of the face again on 12/15/18. Next infusion of Abraxane done on 12/19/18 with Solucortef and Atelizumab held. Abraxane given again on 1/3/19 with no IV steroid. Patient developed swelling of the face on 1/4/19 and went to urgent care and given short course of prednisone 20 mg BID. On 1/15/19, patient seen in hem/onc clinic with c/o of pressure and tightness around neck. CT scan of chest and neck did not reveal any vascular compression. Started on prednisone 60 mg with taper. On 1/22/18 patient with c/o  proximal muscle weakness. CPK found to be elevated around 4k. Patient admitted and given solumedrol 80 mg IV on 1/22/18. Last infusion of Atelizumab on 12/19/18. Last infusion of Abraxane on 1/3/19. Given Solumedrol 1g x 1 on 1/23/18. Seen by Dermatology on 1/24/18 and biopsy done of skin rash. Given distribution of rashes, there was concern for dermatomyositis. Patient started on Solumedrol 125 mg IV BID at this time.      During her hospitalization patient was started on high dose steroid Solumedrol 80mg IV x 1, 1g x 1, and then 125mg BID until tapered down to medrol 48mg.  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  Patient was started on IVIG (400mg/kg/daily x 5 day) 1/29/19-2/2.   Patient started on MTX 20mg SQ (Feb 5 2019) with folic acid. MTX SQ was transitioned to PO because concern about rehab administration.  Patient failed swallow eval and PEG tube was placed.        Denies any family history of autoimmune diseases.     No smoking, EtOH, recreational drug usage.     Denies any photosensitivity, joint swelling, unintentional weigh loss, abdominal pain, night sweats, CP, SOB.  +oral thrush.      Completed rehab at [facility].  Completed IVIG (2/28 and 3/1).  Had mild HA after infusion.  Doing well.  A lot stronger - able to do household chores since discharge.  Not back at baseline yet ~80%.  Mild weakness with increased activities.  Able to ambulate without assistance (but is still a fall precaution).  Tolerating diet via PEG tube.  Completed rehab.      MTX discontinued 2/18 with concern of toxicity (decrease blood counts and transaminatis).  Folic acid increased to 4mg daily.  Still on medrol 32mg daily with atorvaquone for PCP prophylaxis.  Bactrim d/c because of interaction with leucovorin.  Leucovorin 5mg daily started - Leucovorin discontinued.  Continues to be on folic acid 4mg daily     Radiation completed (28 days) completed May 8.       EGD/colonoscopy done on July 29 - normal.  PEG  tube removed     Last PET scan (June 20) showed negative for metastasis.  At this time, will monitor per oncology and repeat PET scan in Sept.  No treatment needed at this time.      Rash was biopsied and evaluated by dermatology.   Biopsy report conclusive of dermatomyositis.  Was given topical steroid which provided minimal alleviation of symptoms.  +paco      6/2020    Completed Rituxan on 4/27 (1g x 2).  Had a flare after infusion requiring increase of steroid.  Since then patient had been doing well - improving of myalgia and rash.  Still having mild edema and generalized weakness (especially in the afternoon/evening).       Patient was started on HCQ - compliant on all home medications.  Continues to work out daily.  Finds most difficulty with getting in and out of the car.   Denies any dysphagia, alopecia, arthralgia, abdominal pain, CP, SOB, N/V, chills, or urinary symptoms.      7/2020    Rash all over her body  Red rash on the left leg  Face once again has erythema and heliotrope rash  Prednisone down to 10 mg and looks like she has a flare again  On plaquenil 200 mg bid  Wears sunscreen  Avoids the direct sun    Most recent labs     Ck nml  Aldolase nml  ESR 53  CRP nml  Mild anemia  AST 67      9/2020    She started xeljanz 5 mg bid mid august  She feels well  Swelling is better  Her weakness and fatigue is all gone  Face still looks red   Chest is all better and upper arm is great  Itch and rash seems better   Rash on the legs is gone     CK,aldolase nml  CRP nml   now   AST 61  GFR nml  ALT nml    plts 145 but stable  H/h 11.6/35.3    But the white count has dropped to 2.70    It has been low on and off since 2019 so this is not new     She has a sinus issue going on   She has greenish d/c  She has a tinge of blood     Review of Systems   Constitutional: Negative for chills, diaphoresis, fever, malaise/fatigue and weight loss.   HENT: Negative for congestion, ear discharge, ear pain, hearing  loss, nosebleeds, sinus pain and tinnitus.    Eyes: Positive for discharge. Negative for photophobia, pain and redness.   Respiratory: Negative for cough, hemoptysis, sputum production, shortness of breath, wheezing and stridor.    Cardiovascular: Negative for chest pain, palpitations, orthopnea, claudication, leg swelling and PND.   Gastrointestinal: Positive for constipation. Negative for abdominal pain, diarrhea, heartburn, nausea and vomiting.   Genitourinary: Negative for dysuria, frequency, hematuria and urgency.   Musculoskeletal: Positive for myalgias. Negative for back pain, joint pain and neck pain.   Skin: Positive for rash.   Neurological: Negative for dizziness, tingling, tremors, weakness and headaches.   Endo/Heme/Allergies: Does not bruise/bleed easily.   Psychiatric/Behavioral: Negative for depression, hallucinations and suicidal ideas. The patient is not nervous/anxious and does not have insomnia.        Past Medical History:   Diagnosis Date    Breast cancer 2017    left    Depression     Dermatomyositis     Diverticulosis     Gastritis     Hypertension     Vitamin B12 deficiency 3/8/2018       Past Surgical History:   Procedure Laterality Date    BREAST BIOPSY Left     BREAST RECONSTRUCTION Left 2017     SECTION      COLONOSCOPY  2011    repeat in 10 yrs.    COLONOSCOPY N/A 2019    Procedure: COLONOSCOPY;  Surgeon: Pernell Rosas MD;  Location: [facility] (4TH FLR);  Service: Endoscopy;  Laterality: N/A;  Patient would like to use her PEG tube for bowel prep and then have her PEG tube removed after EGD and colonoscopy procedures while she is still sedated.    D&C      ESOPHAGOGASTRODUODENOSCOPY N/A 2019    Procedure: EGD (ESOPHAGOGASTRODUODENOSCOPY);  Surgeon: Pernell Rosas MD;  Location: [facility] (2ND FLR);  Service: Endoscopy;  Laterality: N/A;    ESOPHAGOGASTRODUODENOSCOPY N/A 2019    Procedure: EGD (ESOPHAGOGASTRODUODENOSCOPY);  Surgeon: Pernell Rosas  "MD;  Location: [facility] (4TH FLR);  Service: Endoscopy;  Laterality: N/A;  EGD for follow-up of erosive esophagitis per Dr. Rosas    Patient would like to use her PEG tube for bowel prep and then have her PEG tube removed after EGD and colonoscopy procedures while she is still sedated.    INSERTION OF TUNNELED CENTRAL VENOUS CATHETER (CVC) WITH SUBCUTANEOUS PORT Right 10/31/2018    Procedure: [id number];  Surgeon: Deo Palencia MD;  Location[facility] OR [address] [place] [address];  Service: General;  Laterality: Right;    MASTECTOMY Left 06/26/2017    MYOMECTOMY      PORTACATH PLACEMENT      SENTINEL LYMPH NODE BIOPSY  02/2017    left    TOTAL REDUCTION MAMMOPLASTY Right 2017    UPPER GASTROINTESTINAL ENDOSCOPY          Social History     Tobacco Use    Smoking status: Never Smoker    Smokeless tobacco: Never Used   Substance Use Topics    Alcohol use: Yes     Frequency: Never     Drinks per session: Patient refused     Binge frequency: Never     Comment: rare    Drug use: No       Family History   Problem Relation Age of Onset    Heart disease Father     Hypertension Father     Breast cancer Sister 52    Hypertension Mother     No Known Problems Brother     Thyroid disease Daughter         hyperthyroidism s/p thyroidectomy    No Known Problems Son     No Known Problems Brother     No Known Problems Brother     No Known Problems Sister     No Known Problems Daughter     Colon cancer Neg Hx     Ovarian cancer Neg Hx     Celiac disease Neg Hx     Cirrhosis Neg Hx     Colon polyps Neg Hx     Esophageal cancer Neg Hx     Inflammatory bowel disease Neg Hx     Liver cancer Neg Hx     Liver disease Neg Hx     Rectal cancer Neg Hx     Stomach cancer Neg Hx     Ulcerative colitis Neg Hx     Melanoma Neg Hx        Review of patient's allergies indicates:  No Known Allergies    Vitals:    09/23/20 0915   BP: 130/70   Pulse: 109   Weight: 82 kg (180 lb 12.4 oz)   Height: 5' 5" (1.651 m)   PainSc:   1 "       Physical Exam   Constitutional: She is oriented to person, place, and time and well-developed, well-nourished, and in no distress.   HENT:   Head: Normocephalic and atraumatic.   Eyes: Pupils are equal, round, and reactive to light. EOM are normal.   Neck: Normal range of motion. Neck supple.   Cardiovascular: Normal rate, regular rhythm and normal heart sounds.   Pulmonary/Chest: Effort normal and breath sounds normal.   Abdominal: Soft. Bowel sounds are normal.   Musculoskeletal: Normal range of motion.   Neurological: She is alert and oriented to person, place, and time. Gait normal. GCS score is 15.   Skin: Skin is warm and dry. No rash noted. No erythema.   guttron's papules on bilateral hands   heliotropic rash      Psychiatric: Mood, memory, affect and judgment normal.     Erythematous rash on the legs     Labs:  Results for ROMEO PEREZ (MRN [id number]) as of 6/17/2020 10:12   Ref. Range 4/22/2020 11:51 4/24/2020 11:43 5/25/2020 09:34 6/10/2020 11:25 6/10/2020 11:26   WBC Latest Ref Range: 3.90 - 12.70 K/uL 4.30  3.60 (L) 4.60    RBC Latest Ref Range: 4.00 - 5.40 M/uL 4.49  4.52 4.47    Hemoglobin Latest Ref Range: 12.0 - 16.0 g/dL 12.1  12.5 12.4    Hematocrit Latest Ref Range: 37.0 - 48.5 % 38.2  38.7 38.4    MCV Latest Ref Range: 82 - 98 fL 85  86 86    MCH Latest Ref Range: 27.0 - 31.0 pg 27.0  27.7 27.8    MCHC Latest Ref Range: 32.0 - 36.0 g/dL 31.8 (L)  32.4 32.4    RDW Latest Ref Range: 11.5 - 14.5 % 16.0 (H)  17.2 (H) 17.3 (H)    Platelets Latest Ref Range: 150 - 350 K/uL 157  206 164    MPV Latest Ref Range: 7.4 - 10.4 fL 7.3 (L)  7.0 (L) 7.2 (L)    Gran% Latest Ref Range: 38.0 - 73.0 % 66.0  62.8 66.2    Gran # (ANC) Latest Ref Range: 1.8 - 7.7 K/uL 2.8  2.2 3.0    Lymph% Latest Ref Range: 18.0 - 48.0 % 15.3 (L)  15.5 (L) 14.6 (L)    Lymph # Latest Ref Range: 1.0 - 4.8 K/uL 0.7 (L)  0.6 (L) 0.7 (L)    Mono% Latest Ref Range: 4.0 - 15.0 % 11.2  17.7 (H) 14.2    Mono # Latest Ref Range: 0.3  - 1.0 K/uL 0.5  0.6 0.6    Eosinophil% Latest Ref Range: 0.0 - 8.0 % 6.5  3.1 3.9    Eos # Latest Ref Range: 0.0 - 0.5 K/uL 0.3  0.1 0.2    Basophil% Latest Ref Range: 0.0 - 1.9 % 1.0  0.9 1.1    Baso # Latest Ref Range: 0.00 - 0.20 K/uL 0.00  0.00 0.00    nRBC Latest Ref Range: 0 /100 WBC 0  0 0    Differential Method Unknown Automated  Automated Automated    Sed Rate Latest Ref Range: 0 - 30 mm/Hr 56 (H)  91 (H)  73 (H)   Sodium Latest Ref Range: 136 - 145 mmol/L 135 (L)  139 137    Potassium Latest Ref Range: 3.5 - 5.1 mmol/L 3.8  3.8 3.8    Chloride Latest Ref Range: 95 - 110 mmol/L 98  102 102    CO2 Latest Ref Range: 23 - 29 mmol/L 31 (H)  29 29    BUN, Bld Latest Ref Range: 7 - 17 mg/dL 17  12 13    Creatinine Latest Ref Range: 0.70 - 1.20 mg/dL 0.70  0.60 (L) 0.70    eGFR if non African American Latest Ref Range: >60 mL/min/1.73 m^2 >60  >60 >60    eGFR if  Latest Ref Range: >60 mL/min/1.73 m^2 >60  >60 >60    Glucose Latest Ref Range: 74 - 106 mg/dL 114 (H)  89 93    Calcium Latest Ref Range: 8.4 - 10.2 mg/dL 9.8  9.8 9.7    Alkaline Phosphatase Latest Ref Range: 38 - 145 U/L 65  63 58    PROTEIN TOTAL Latest Ref Range: 6.3 - 8.2 g/dL 9.3 (H)  8.9 (H) 8.4 (H)    Albumin Latest Ref Range: 3.5 - 5.2 g/dL 4.1  4.2 4.1    BILIRUBIN TOTAL Latest Ref Range: 0.2 - 1.3 mg/dL 0.4  0.5 0.4    AST Latest Ref Range: 14 - 36 U/L 85 (H)  64 (H) 62 (H)    ALT Latest Ref Range: 10 - 44 U/L 28  26 22    CRP Latest Ref Range: 0.0 - 10.0 mg/L <5.0  <5.0 <5.0    CPK Latest Ref Range: 30 - 135 U/L 115  79 101    Hemoglobin A1C External Latest Ref Range: 4.0 - 6.0 %     6.0   SARS-CoV2 (COVID-19) Qualitative PCR Latest Ref Range: Not Detected   Not Detected      Aldolase Latest Ref Range: < OR = 8.1 U/L 5.9  5.5  4.7     Medication List with Changes/Refills   New Medications    AMOXICILLIN (AMOXIL) 875 MG TABLET    Take 1 tablet (875 mg total) by mouth every 12 (twelve) hours. for 7 days    PREDNISONE (DELTASONE)  1 MG TABLET    4 mg daily for a week,3 mg daily for a week,2 mg daily for a week,1 mg daily for a week and stop   Current Medications    AMLODIPINE (NORVASC) 10 MG TABLET    Take 1 tablet (10 mg total) by mouth once daily.    AZELASTINE (ASTELIN) 137 MCG (0.1 %) NASAL SPRAY    1 spray (137 mcg total) by Nasal route 2 (two) times daily.    CALCIUM CARBONATE (OS-ESTELA) 600 MG CALCIUM (1,500 MG) TAB    Take 600 mg by mouth 2 (two) times daily with meals.    CLOBETASOL (TEMOVATE) 0.05 % CREAM    Apply topically 2 (two) times daily.    FUROSEMIDE (LASIX) 20 MG TABLET    Take 1 tablet (20 mg total) by mouth once daily.    PREDNISONE (DELTASONE) 20 MG TABLET        TOFACITINIB (XELJANZ) 5 MG TAB    Take 5 mg by mouth 2 (two) times daily.    TRIAMCINOLONE ACETONIDE 0.025% (KENALOG) 0.025 % CREAM    AAA on face BID x 1-2 wks then prn flares only    VITAMIN D (VITAMIN D3) 1000 UNITS TAB    Take 1,000 Units by mouth once daily.       Assessment/Plans:-  59 y.o.F with history of L sided infiltrating ductal carcinoma of the L breast (dx on Jan 2017), HTN, depression, gastritis, and recently diagnosed myositis (uncertain if it's autoimmune vs medication induced), present today for follow up because of concern about myositis flare.     Patient started on Atelizumab/Abraxane on 11/7/18.  Patient developed swelling of the face on 1/4/19 and went to urgent care and given short course of prednisone 20 mg BID. On 1/15/19, patient seen in hem/onc clinic with c/o of pressure and tightness around neck. CT scan of chest and neck did not reveal any vascular compression. Started on prednisone 60 mg with taper. On 1/22/18 patient with c/o proximal muscle weakness. CPK found to be elevated around 4k. Patient admitted and given solumedrol 80 mg IV on 1/22/18. Last infusion of Atelizumab on 12/19/18. Last infusion of Abraxane on 1/3/19. Given Solumedrol 1g x 1 on 1/23/18. Seen by Dermatology on 1/24/18 and biopsy done of skin rash. Given  distribution of rashes, there was concern for dermatomyositis. Patient started on Solumedrol 125 mg IV BID at that time.  Skin biopsy resulted consistent with dermatomyositis.     Labs: CPK and Aldolase normalized. +DARCY 1:640 speckled with negative profile.  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 quantTB: indeterminate, T-spot: negative     Spirometry: normal, normal diffusion capacity. FVC: 81%, DLCO: 114%     Patient exhibits signs of dermatomyositis (heliotropic rash and gottron's papules).   Dermatomyositis can be associated with malignancy.  MRI of LUE showed edema of the deltoid and biceps.  Exam with proximal muscle weakness: b/l deltoids 4/5, b/l iliopsoas 4.4/5. 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   Given +TIF1 gamma positivity, most likely dermatomyositis is from underlying malignancy.      Failed 2nd swallow eval on 2/6/19. PEG placed 2/7/2019.     Current medications:  - prednisone 20mg   - IVIG Started Jan 2019 - last dose April 7  - Rituxan 1000mg x 2 (last dose April 27)  - HCQ 200mg BID      Esophageal biopsy - negative for viral infection.  Nonspecific chronic inflammation.     EGD/Colonoscopy (July 2019) - normal. PEG tube removed      Fiboscan done Aug 2019 - showed fatty liver with no scarring/damage.,      Failed Cellcept - worsening leukopenia, elevated LFTs, and other side effects without improvement of symptoms      Recent studies demonstrated increased efficacy in IVIG when spaced out (Q2w instead of Q4w)     Patient is an intermediate metabolizer based on TPMT.   Cannot start imuran.  Labs still neutropenic and thrombocytopenic at this time - uncertain of the cause (radiation induce BM fibrosis vs medication induce?)      Vaccination completed - Prevnar and Shingles (completed Aug 2019) and flu vaccination for this season (Aug 2019)      Dermatomyositis - currently on Rituxan and Prednisone 20mg daily.  Tolerating well and improvement of symptoms.  Plan is taper steroid and continue Rituxan 1000mg x 2 every 6 months.     It appears that patient continued to fail steroid tapering on multiple occasion.  Patient is uptodate on all CA screening - next PET scan is July 2020.  Other consideration for continued myalgia includes neurological condition such as MG.  Will other MG panel and referral to neurology.     If patient continues to failed steroid tapering - consideration for tacrolimus + IVIG, Xeljanz, plasmapharesis/plasma exchange.       My addendum last time    59 year old female with dermatomyositis s/p PD1 inhibitor used for breast cancer  TIF1 gamma +    Low dose steroids didn't help    Rash+ muscle weakness+dysphagia     IVIG and steroids initial treatment    mtx causes LFT derangement,anemia and leukopenia   She didn't respond to IVIG, initially we gave IVIG 2 g/kg every 4 weeks and then 1 g/kg bw ever 2 weeks : poor response   She was on cellcept 500 mg daily and she had cytopenias   Imuran not an option since she is intermediate metaboliser     She had significant periorbital edema,rash on the neck and thighs    We didn't think she was responding to the treatment     So we gave her rituximab 1000 mg x 2    She did well and then she flared again may 2020    We had to go up on the dose to 30 mg and then tapered it to 20 mg  We started plaquenil    CT chest abdomen and pelvis nml  Colonoscopy nml  PET lymphadenopathy  Echo nml    Myasthenia gravis panel negative     Last time she needed prednisone 10 mg     She was weak and her skin had flared    Refractory  dermatomyositis      We resumed IVIG and xeljanz 5 mg bid     She has done really well    9/2020    She started xeljanz 5 mg bid mid august  She feels well  Swelling is better  Her weakness and fatigue is all gone  Face still looks red   Chest is all better and upper arm is great  Itch and rash seems better   Rash on the legs is gone     CK,aldolase nml  CRP nml   now   AST 61  GFR nml  ALT nml    plts 145 but stable  H/h 11.6/35.3    But the white count has dropped to 2.70    It has been low on and off since 2019 so this is not new     She has a sinus issue going on   She has greenish d/c  She has a tinge of blood       Her lymphocyte count is 900 cells/mm3    Lymphopenia    In the controlled clinical trials, confirmed decreases in absolute lymphocyte counts below 500 cells/mm3 occurred in 0.04% of patients for the 5 mg twice daily and 10 mg twice daily XELJANZ groups combined during the first 3 months of exposure.    Confirmed lymphocyte counts less than 500 cells/mm3 were associated with an increased incidence of treated and serious infections      Her neutrophil count is 1300 cells/mm3    Neutropenia    In the controlled clinical trials, confirmed decreases in ANC below 1000 cells/mm3 occurred in 0.07% of patients for the 5 mg twice daily and 10 mg twice daily XELJANZ groups combined during the first 3 months of exposure.    There were no confirmed decreases in ANC below 500 cells/mm3 observed in any treatment group.    There was no clear relationship between neutropenia and the occurrence of serious infections.    In the long-term safety population, the pattern and incidence of confirmed decreases in ANC remained consistent with what was seen in the controlled clinical trials        Lymphocyte Abnormalities    Treatment with XELJANZ was associated with initial lymphocytosis at one month of exposure followed by a gradual decrease in mean absolute lymphocyte counts below the baseline of approximately 10%  during 12 months of therapy. Lymphocyte counts less than 500 cells/mm3 were associated with an increased incidence of treated and serious infections.    Avoid initiation of XELJANZ/XELJANZ XR treatment in patients with a low lymphocyte count (i.e., less than 500 cells/mm3). In patients who develop a confirmed absolute lymphocyte count less than 500 cells/mm3, treatment with XELJANZ/XELJANZ XR is not recommended.    Monitor lymphocyte counts at baseline and every 3 months thereafter.       Neutropenia    Treatment with XELJANZ was associated with an increased incidence of neutropenia (less than 2000 cells/mm3) compared to placebo.    Avoid initiation of XELJANZ/XELJANZ XR treatment in patients with a low neutrophil count (i.e., ANC less than 1000 cells/mm3). For patients who develop a persistent ANC of 500 to 1000 cells/mm3, interrupt XELJANZ/XELJANZ XR dosing until ANC is greater than or equal to 1000 cells/mm3. In patients who develop an ANC less than 500 cells/mm3, treatment with XELJANZ/XELJANZ XR is not recommended.    Monitor neutrophil counts at baseline and after 4-8 weeks of treatment and every 3 months thereafter.     Anemia    Avoid initiation of XELJANZ/XELJANZ XR treatment in patients with a low hemoglobin level (i.e., less than 9 g/dL). Treatment with XELJANZ/XELJANZ XR should be interrupted in patients who develop hemoglobin levels less than 8 g/dL or whose hemoglobin level drops greater than 2 g/dL on treatment.    So at this point we decided to continue the xeljanz 5 mg bid and IVIG       Plan     -never had blood clot  -never had diverticulitis    CONTINUE  IVIG  tofacitinib 5 mg bid    We are tapering prednisone 1 mg a week and she is down to 4 mg daily    Sun screen overtly emphasised    - continue muscle strengthening exercise daily  - 1200 mg dietary calcium and Vitamin D3 1000 mg daily    Will give a course of antibiotics for the sinus infection???  Amoxicillin  Rpt ESR in a month  Rpt CBC in a  month    Follow up in 2 months     If CBC shows further worsening then I might have to d.c xeljanz  But if stable then I will check labs frequently and we will keep talking if any infections and then re visit xeljanz d/c vs continuation          Answers for HPI/ROS submitted by the patient on 9/20/2020   mouth sores: No  trouble swallowing: No  unexpected weight change: No  genital sore: No

## 2020-09-23 NOTE — PROGRESS NOTES
Rapid3 Question Responses and Scores 9/20/2020   MDHAQ Score 0.6   Psychologic Score 2.2   Pain Score 1   When you awakened in the morning OVER THE LAST WEEK, did you feel stiff? Yes   If Yes, please indicate the number of hours until you are as limber as you will be for the day 2   Fatigue Score 0.5   Global Health Score 1.5   RAPID3 Score 1.5         Answers for HPI/ROS submitted by the patient on 9/20/2020   fever: No  eye redness: No  mouth sores: No  headaches: No  shortness of breath: No  chest pain: No  trouble swallowing: No  diarrhea: No  constipation: No  unexpected weight change: No  genital sore: No  dysuria: No  During the last 3 days, have you had a skin rash?: Yes  Bruises or bleeds easily: No  cough: No

## 2020-09-28 ENCOUNTER — TELEPHONE (OUTPATIENT)
Dept: INFECTIOUS DISEASES | Facility: HOSPITAL | Age: 60
End: 2020-09-28

## 2020-09-28 ENCOUNTER — IMMUNIZATION (OUTPATIENT)
Dept: PHARMACY | Facility: CLINIC | Age: 60
End: 2020-09-28
Payer: COMMERCIAL

## 2020-09-29 ENCOUNTER — TELEPHONE (OUTPATIENT)
Dept: RHEUMATOLOGY | Facility: CLINIC | Age: 60
End: 2020-09-29

## 2020-09-29 ENCOUNTER — INFUSION (OUTPATIENT)
Dept: INFECTIOUS DISEASES | Facility: HOSPITAL | Age: 60
End: 2020-09-29
Attending: INTERNAL MEDICINE
Payer: COMMERCIAL

## 2020-09-29 VITALS
BODY MASS INDEX: 29.53 KG/M2 | OXYGEN SATURATION: 98 % | TEMPERATURE: 99 F | WEIGHT: 177.5 LBS | HEART RATE: 95 BPM | RESPIRATION RATE: 18 BRPM | SYSTOLIC BLOOD PRESSURE: 150 MMHG | DIASTOLIC BLOOD PRESSURE: 75 MMHG

## 2020-09-29 DIAGNOSIS — R13.19 ESOPHAGEAL DYSPHAGIA: ICD-10-CM

## 2020-09-29 DIAGNOSIS — M33.13 DERMATOMYOSITIS: Primary | ICD-10-CM

## 2020-09-29 PROCEDURE — 96375 TX/PRO/DX INJ NEW DRUG ADDON: CPT

## 2020-09-29 PROCEDURE — 96367 TX/PROPH/DG ADDL SEQ IV INF: CPT

## 2020-09-29 PROCEDURE — 25000003 PHARM REV CODE 250: Performed by: INTERNAL MEDICINE

## 2020-09-29 PROCEDURE — S0028 INJECTION, FAMOTIDINE, 20 MG: HCPCS | Performed by: INTERNAL MEDICINE

## 2020-09-29 PROCEDURE — 96366 THER/PROPH/DIAG IV INF ADDON: CPT

## 2020-09-29 PROCEDURE — 63600175 PHARM REV CODE 636 W HCPCS: Mod: JG | Performed by: INTERNAL MEDICINE

## 2020-09-29 PROCEDURE — 96365 THER/PROPH/DIAG IV INF INIT: CPT

## 2020-09-29 RX ORDER — HEPARIN 100 UNIT/ML
500 SYRINGE INTRAVENOUS
Status: CANCELLED | OUTPATIENT
Start: 2020-10-27

## 2020-09-29 RX ORDER — FAMOTIDINE 10 MG/ML
20 INJECTION INTRAVENOUS
Status: CANCELLED | OUTPATIENT
Start: 2020-10-27

## 2020-09-29 RX ORDER — SODIUM CHLORIDE 0.9 % (FLUSH) 0.9 %
10 SYRINGE (ML) INJECTION
Status: CANCELLED | OUTPATIENT
Start: 2020-10-27

## 2020-09-29 RX ORDER — ACETAMINOPHEN 325 MG/1
650 TABLET ORAL
Status: COMPLETED | OUTPATIENT
Start: 2020-09-29 | End: 2020-09-29

## 2020-09-29 RX ORDER — HEPARIN 100 UNIT/ML
500 SYRINGE INTRAVENOUS
Status: DISCONTINUED | OUTPATIENT
Start: 2020-09-29 | End: 2020-09-29 | Stop reason: HOSPADM

## 2020-09-29 RX ORDER — FAMOTIDINE 10 MG/ML
20 INJECTION INTRAVENOUS
Status: COMPLETED | OUTPATIENT
Start: 2020-09-29 | End: 2020-09-29

## 2020-09-29 RX ORDER — ACETAMINOPHEN 325 MG/1
650 TABLET ORAL
Status: CANCELLED | OUTPATIENT
Start: 2020-10-27

## 2020-09-29 RX ORDER — SODIUM CHLORIDE 0.9 % (FLUSH) 0.9 %
10 SYRINGE (ML) INJECTION
Status: DISCONTINUED | OUTPATIENT
Start: 2020-09-29 | End: 2020-09-29 | Stop reason: HOSPADM

## 2020-09-29 RX ADMIN — HEPARIN 500 UNITS: 100 SYRINGE at 04:09

## 2020-09-29 RX ADMIN — SODIUM CHLORIDE: 0.9 INJECTION, SOLUTION INTRAVENOUS at 09:09

## 2020-09-29 RX ADMIN — FAMOTIDINE 20 MG: 10 INJECTION INTRAVENOUS at 09:09

## 2020-09-29 RX ADMIN — DIPHENHYDRAMINE HYDROCHLORIDE 50 MG: 50 INJECTION, SOLUTION INTRAMUSCULAR; INTRAVENOUS at 09:09

## 2020-09-29 RX ADMIN — ACETAMINOPHEN 650 MG: 325 TABLET ORAL at 09:09

## 2020-09-29 RX ADMIN — HUMAN IMMUNOGLOBULIN G 80 G: 40 LIQUID INTRAVENOUS at 10:09

## 2020-09-29 NOTE — TELEPHONE ENCOUNTER
----- Message from Kathryn Person sent at 9/29/2020  1:37 PM CDT -----  Regarding: Requesting a callback  Contact: Ermelinda  Type:  Needs Medical Advice    Who Called: Pt  Would the patient rather a call back or a response via [facility]? callback  Best Call Back Number: [phone]  Additional Information: Says she need to talk with nurse regarding additional info that was needed for her medication

## 2020-09-29 NOTE — PROGRESS NOTES
Pt arrived to Infusion suite with Lupus flair, face and upper torso red, with swelling to both eyelids noted, denies SOB, on steroid daily.    Pt arrived to Infusion suite for 1/2 Privigen 85 gms, received tylenol 650 mg po, pepcid 20 mg ivp, and benadryl 50mg/50cc NS ivpb 30 mins prior to Privigen infusion. Started infusion @ 24cc/hr and max rate of 216 cc/hr, tolerated well.

## 2020-09-30 ENCOUNTER — PATIENT MESSAGE (OUTPATIENT)
Dept: RHEUMATOLOGY | Facility: CLINIC | Age: 60
End: 2020-09-30

## 2020-09-30 ENCOUNTER — INFUSION (OUTPATIENT)
Dept: INFECTIOUS DISEASES | Facility: HOSPITAL | Age: 60
End: 2020-09-30
Attending: INTERNAL MEDICINE
Payer: COMMERCIAL

## 2020-09-30 VITALS
BODY MASS INDEX: 29.34 KG/M2 | SYSTOLIC BLOOD PRESSURE: 153 MMHG | RESPIRATION RATE: 17 BRPM | DIASTOLIC BLOOD PRESSURE: 78 MMHG | HEART RATE: 97 BPM | TEMPERATURE: 99 F | WEIGHT: 176.13 LBS | OXYGEN SATURATION: 99 % | HEIGHT: 65 IN

## 2020-09-30 DIAGNOSIS — M33.13 DERMATOMYOSITIS: Primary | ICD-10-CM

## 2020-09-30 DIAGNOSIS — R13.19 ESOPHAGEAL DYSPHAGIA: ICD-10-CM

## 2020-09-30 PROCEDURE — 96367 TX/PROPH/DG ADDL SEQ IV INF: CPT

## 2020-09-30 PROCEDURE — 25000003 PHARM REV CODE 250: Performed by: INTERNAL MEDICINE

## 2020-09-30 PROCEDURE — 96375 TX/PRO/DX INJ NEW DRUG ADDON: CPT

## 2020-09-30 PROCEDURE — 63600175 PHARM REV CODE 636 W HCPCS: Mod: JG | Performed by: INTERNAL MEDICINE

## 2020-09-30 PROCEDURE — 96366 THER/PROPH/DIAG IV INF ADDON: CPT

## 2020-09-30 PROCEDURE — S0028 INJECTION, FAMOTIDINE, 20 MG: HCPCS | Performed by: INTERNAL MEDICINE

## 2020-09-30 PROCEDURE — 96365 THER/PROPH/DIAG IV INF INIT: CPT

## 2020-09-30 RX ORDER — FAMOTIDINE 10 MG/ML
20 INJECTION INTRAVENOUS
Status: CANCELLED | OUTPATIENT
Start: 2020-10-28

## 2020-09-30 RX ORDER — SODIUM CHLORIDE 0.9 % (FLUSH) 0.9 %
10 SYRINGE (ML) INJECTION
Status: DISCONTINUED | OUTPATIENT
Start: 2020-09-30 | End: 2020-09-30 | Stop reason: HOSPADM

## 2020-09-30 RX ORDER — SODIUM CHLORIDE 0.9 % (FLUSH) 0.9 %
10 SYRINGE (ML) INJECTION
Status: CANCELLED | OUTPATIENT
Start: 2020-10-28

## 2020-09-30 RX ORDER — HEPARIN 100 UNIT/ML
500 SYRINGE INTRAVENOUS
Status: DISCONTINUED | OUTPATIENT
Start: 2020-09-30 | End: 2020-09-30 | Stop reason: HOSPADM

## 2020-09-30 RX ORDER — ACETAMINOPHEN 325 MG/1
650 TABLET ORAL
Status: COMPLETED | OUTPATIENT
Start: 2020-09-30 | End: 2020-09-30

## 2020-09-30 RX ORDER — FAMOTIDINE 10 MG/ML
20 INJECTION INTRAVENOUS
Status: COMPLETED | OUTPATIENT
Start: 2020-09-30 | End: 2020-09-30

## 2020-09-30 RX ORDER — ACETAMINOPHEN 325 MG/1
650 TABLET ORAL
Status: CANCELLED | OUTPATIENT
Start: 2020-10-28

## 2020-09-30 RX ORDER — HEPARIN 100 UNIT/ML
500 SYRINGE INTRAVENOUS
Status: CANCELLED | OUTPATIENT
Start: 2020-10-28

## 2020-09-30 RX ADMIN — HUMAN IMMUNOGLOBULIN G 80 G: 40 LIQUID INTRAVENOUS at 09:09

## 2020-09-30 RX ADMIN — FAMOTIDINE 20 MG: 10 INJECTION INTRAVENOUS at 09:09

## 2020-09-30 RX ADMIN — DIPHENHYDRAMINE HYDROCHLORIDE 50 MG: 50 INJECTION, SOLUTION INTRAMUSCULAR; INTRAVENOUS at 09:09

## 2020-09-30 RX ADMIN — ACETAMINOPHEN 650 MG: 325 TABLET ORAL at 09:09

## 2020-09-30 RX ADMIN — SODIUM CHLORIDE: 0.9 INJECTION, SOLUTION INTRAVENOUS at 09:09

## 2020-09-30 NOTE — PROGRESS NOTES
Pt arrived to Infusion suite with Lupus flair, face and upper torso red, with swelling to both eyelids noted, denies SOB, on steroid daily.    Pt arrived to Infusion suite for 1/2 Privigen 85 gms, received tylenol 650 mg po, pepcid 20 mg ivp, and benadryl 50mg/50cc NS ivpb 30 mins prior to Privigen infusion. Started infusion @ 48cc/hr and max rate of 240 cc/hr, tolerated well.

## 2020-10-02 ENCOUNTER — TELEPHONE (OUTPATIENT)
Dept: PHARMACY | Facility: CLINIC | Age: 60
End: 2020-10-02

## 2020-10-05 ENCOUNTER — PATIENT MESSAGE (OUTPATIENT)
Dept: SURGERY | Facility: CLINIC | Age: 60
End: 2020-10-05

## 2020-10-05 DIAGNOSIS — Z12.31 ENCOUNTER FOR SCREENING MAMMOGRAM FOR BREAST CANCER: Primary | ICD-10-CM

## 2020-10-06 ENCOUNTER — OFFICE VISIT (OUTPATIENT)
Dept: INTERNAL MEDICINE | Facility: CLINIC | Age: 60
End: 2020-10-06
Payer: COMMERCIAL

## 2020-10-06 VITALS
DIASTOLIC BLOOD PRESSURE: 74 MMHG | SYSTOLIC BLOOD PRESSURE: 136 MMHG | HEART RATE: 94 BPM | WEIGHT: 177.94 LBS | HEIGHT: 65 IN | BODY MASS INDEX: 29.65 KG/M2 | OXYGEN SATURATION: 98 %

## 2020-10-06 DIAGNOSIS — R73.02 IGT (IMPAIRED GLUCOSE TOLERANCE): ICD-10-CM

## 2020-10-06 DIAGNOSIS — Z79.52 CURRENT CHRONIC USE OF SYSTEMIC STEROIDS: ICD-10-CM

## 2020-10-06 DIAGNOSIS — D61.818 PANCYTOPENIA: ICD-10-CM

## 2020-10-06 DIAGNOSIS — I10 BENIGN ESSENTIAL HYPERTENSION: Primary | ICD-10-CM

## 2020-10-06 DIAGNOSIS — D84.9 IMMUNOSUPPRESSION: ICD-10-CM

## 2020-10-06 DIAGNOSIS — M33.20 POLYMYOSITIS: ICD-10-CM

## 2020-10-06 PROCEDURE — 3078F DIAST BP <80 MM HG: CPT | Mod: CPTII,S$GLB,, | Performed by: INTERNAL MEDICINE

## 2020-10-06 PROCEDURE — 3078F PR MOST RECENT DIASTOLIC BLOOD PRESSURE < 80 MM HG: ICD-10-PCS | Mod: CPTII,S$GLB,, | Performed by: INTERNAL MEDICINE

## 2020-10-06 PROCEDURE — 99999 PR PBB SHADOW E&M-EST. PATIENT-LVL III: CPT | Mod: PBBFAC,,, | Performed by: INTERNAL MEDICINE

## 2020-10-06 PROCEDURE — 99214 PR OFFICE/OUTPT VISIT, EST, LEVL IV, 30-39 MIN: ICD-10-PCS | Mod: S$GLB,,, | Performed by: INTERNAL MEDICINE

## 2020-10-06 PROCEDURE — 3008F BODY MASS INDEX DOCD: CPT | Mod: CPTII,S$GLB,, | Performed by: INTERNAL MEDICINE

## 2020-10-06 PROCEDURE — 3008F PR BODY MASS INDEX (BMI) DOCUMENTED: ICD-10-PCS | Mod: CPTII,S$GLB,, | Performed by: INTERNAL MEDICINE

## 2020-10-06 PROCEDURE — 99999 PR PBB SHADOW E&M-EST. PATIENT-LVL III: ICD-10-PCS | Mod: PBBFAC,,, | Performed by: INTERNAL MEDICINE

## 2020-10-06 PROCEDURE — 3075F SYST BP GE 130 - 139MM HG: CPT | Mod: CPTII,S$GLB,, | Performed by: INTERNAL MEDICINE

## 2020-10-06 PROCEDURE — 99214 OFFICE O/P EST MOD 30 MIN: CPT | Mod: S$GLB,,, | Performed by: INTERNAL MEDICINE

## 2020-10-06 PROCEDURE — 3075F PR MOST RECENT SYSTOLIC BLOOD PRESS GE 130-139MM HG: ICD-10-PCS | Mod: CPTII,S$GLB,, | Performed by: INTERNAL MEDICINE

## 2020-10-06 NOTE — PROGRESS NOTES
Subjective:       Patient ID: Ermelinda Verde is a 59 y.o. female.    Chief Complaint: Follow-up    HPI   Pt is well known to me.   Changed to xeljanz 5mg BID in August and IVIG monthly. Feels MUCH better. Does still have some joint stiffness but pain is much improved. Redness and swelling have improved. Still w/ some redness around the face and eyes but overall swelling is much better. At our last visit in June, pt was on prednisone 20mg daily. Down to 2mg daily.   Walks the long driveway 3x/week. Bike for about 15-30 min 3x/week. Started out w/ 10 min but able to work her way up. Folding clothes are easy now (prior to xeljanz, unable to do that). Sweep daily and washing dishes.   CPK WNL.   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 Takes Ca and vit D 1200mg and 1000 units daily.    HTN - amlodipine 10mg daily.     Did have sinus issues about a week ago. S/p amoxicillin. Started back on astelin. Doing better now. No fevers/chills.   WBC - 2.7 two weeks ago. Hgb also mildly low at 11.6 and PLT low at 145.    Review of Systems   Constitutional: Negative for activity change, chills, fever and unexpected weight change.   HENT: Negative for congestion, hearing loss, rhinorrhea and trouble swallowing.    Eyes: Negative for discharge and visual disturbance.   Respiratory: Negative for chest tightness and wheezing.    Cardiovascular: Negative for chest pain and palpitations.   Gastrointestinal: Positive for constipation. Negative for blood in stool, diarrhea and vomiting.   Endocrine: Negative for polydipsia and polyuria.   Genitourinary: Negative for difficulty urinating, dysuria, hematuria and menstrual problem.   Musculoskeletal: Negative for arthralgias, joint swelling and neck pain.   Neurological: Negative for weakness and headaches.   Psychiatric/Behavioral: Negative for confusion and dysphoric mood.         Objective:      Physical Exam    /74 (BP Location: Left arm, Patient Position: Sitting, BP Method: Large (Manual))    "Pulse 94   Ht 5' 5" (1.651 m)   Wt 80.7 kg (177 lb 14.6 oz)   LMP  (LMP Unknown)   SpO2 98%   BMI 29.61 kg/m²     GEN - A+OX4, NAD   HEENT - PERRL, EOMI, OP clear. TM normal.   Neck - No thyromegaly or cervical LAD. No thyroid masses felt.  CV - RRR, no m/r   Chest - CTAB, no wheezing or rhonchi  Abd - S/NT/ND/+BS.   Ext - 2+BDP and radial pulses. No C/C/E.  Neuro - 5/5 BUE and BLE strength. Decreased DTRs.   Skin - erythema of B upper lids and also mild around the face, knuckles and chest.     Labs reviewed.     Assessment/Plan     Devjacob was seen today for follow-up.    Diagnoses and all orders for this visit:    Benign essential hypertension - Stable and controlled. Continue current medications.    Polymyositis - doing so much better on the Xeljanz! Weaning prednisone w/ rheumatologist.     Immunosuppression - sinus infection cleared s/p amoxicillin. Pt to let me know if she has any symptoms of infection b/c given her immunosuppressed status, lower threshold for antibiotics.     IGT (impaired glucose tolerance) - likely due to chronic prednisone.   -     Hemoglobin A1C; Future    Current chronic use of systemic steroids  -     Hemoglobin A1C; Future  -     Comprehensive Metabolic Panel; Future    Pancytopenia - has labs w/ Dr. Reyna on Friday.   Mildly elevated ALT. Recent CT A/P w/ some cyst in the liver. Has upcoming repeat CT.     Already had flu vaccine.     Follow up in about 4 months (around 2/6/2021).      Reta Weeks MD  Department of Internal Medicine - [facility]  10:35 AM    "

## 2020-10-09 ENCOUNTER — HOSPITAL ENCOUNTER (OUTPATIENT)
Dept: RADIOLOGY | Facility: HOSPITAL | Age: 60
Discharge: HOME | End: 2020-10-09
Attending: INTERNAL MEDICINE
Payer: COMMERCIAL

## 2020-10-09 DIAGNOSIS — Z17.1 MALIGNANT NEOPLASM OF UPPER-OUTER QUADRANT OF LEFT BREAST IN FEMALE, ESTROGEN RECEPTOR NEGATIVE: ICD-10-CM

## 2020-10-09 DIAGNOSIS — Z17.1 MALIGNANT NEOPLASM OF UPPER-OUTER QUADRANT OF LEFT BREAST IN FEMALE, ESTROGEN RECEPTOR NEGATIVE: Chronic | ICD-10-CM

## 2020-10-09 DIAGNOSIS — C50.412 MALIGNANT NEOPLASM OF UPPER-OUTER QUADRANT OF LEFT BREAST IN FEMALE, ESTROGEN RECEPTOR NEGATIVE: Chronic | ICD-10-CM

## 2020-10-09 DIAGNOSIS — C50.412 MALIGNANT NEOPLASM OF UPPER-OUTER QUADRANT OF LEFT BREAST IN FEMALE, ESTROGEN RECEPTOR NEGATIVE: ICD-10-CM

## 2020-10-09 PROCEDURE — 70491 CT SOFT TISSUE NECK W/DYE: CPT | Mod: 26,,, | Performed by: RADIOLOGY

## 2020-10-09 PROCEDURE — 71260 CT THORAX DX C+: CPT | Mod: 26,,, | Performed by: RADIOLOGY

## 2020-10-09 PROCEDURE — 70491 CT NECK CHEST WITH CONTRAST (XPD): ICD-10-PCS | Mod: 26,,, | Performed by: RADIOLOGY

## 2020-10-09 PROCEDURE — 25500020 PHARM REV CODE 255: Performed by: INTERNAL MEDICINE

## 2020-10-09 PROCEDURE — 74177 CT ABD & PELVIS W/CONTRAST: CPT | Mod: 26,,, | Performed by: RADIOLOGY

## 2020-10-09 PROCEDURE — 74177 CT ABDOMEN PELVIS WITH CONTRAST: ICD-10-PCS | Mod: 26,,, | Performed by: RADIOLOGY

## 2020-10-09 PROCEDURE — 74177 CT ABD & PELVIS W/CONTRAST: CPT | Mod: TC

## 2020-10-09 PROCEDURE — 71260 CT NECK CHEST WITH CONTRAST (XPD): ICD-10-PCS | Mod: 26,,, | Performed by: RADIOLOGY

## 2020-10-09 PROCEDURE — 70491 CT SOFT TISSUE NECK W/DYE: CPT | Mod: TC

## 2020-10-09 RX ADMIN — IOHEXOL 100 ML: 350 INJECTION, SOLUTION INTRAVENOUS at 09:10

## 2020-10-09 NOTE — PROGRESS NOTES
Subjective:       Patient ID: Ermelinda Verde is a 59 y.o. female.    Chief Complaint: No chief complaint on file.    HPI Mrs Verde is  a very pleasant 59-year-old -American female who returns for a diagnosis of recurrent triple negative left breast cancer.    Her course has been complicated by the development of dermatomyositis which necessitated stopping her chemo immunotherapy.    The dermatomyositis has been her biggest issue. She started tofacitinib in August and has had significant improvement in her rash and weakness.  She is currently on 2 mg of prednisone.  Her swallowing has been good.  She has shortness of breath.  She has some occasional arthralgias especially in the right knee.         Onc History:  She developed a palpable abnormality in her left breast in January 2017 which she noted on self-examination.  A diagnostic mammogram on January 19 showed a greater than 1 cm nodule in the upper outer portion of left breast.  By ultrasound this was lobulated and hypoechoic measuring 1.75 x 1.51 x 1.96 cm.     On January 24, 2017 a core needle biopsy was performed which showed infiltrating ductal carcinoma, high grade.  The tumor was ER negative, MD negative, and HER-2 negative.  A follow-up ultrasound on December 6 showed 2.5 x 2.2 x 1.5 cm left breast mass.  There was no abnormality noted in the left axilla.     She underwent sentinel lymph node biopsy on February 22.  That showed 4 negative lymph nodes.     She had 4 cycles of  Wilbur-adjuvantTaxotere and Cytoxan completed  on 5/9/17.     On June 26 she underwent left mastectomy.  That revealed 2 foci of invasive high-grade carcinoma measuring 14 mm and 1.5 mm.  Margins were negative.    She completed 4 cycles of adjuvant Adriamycin on September 12, 2017.      In October 2018, she developed some left supraclavicular lymphadenopathy which turned out to be recurrence.     A fine-needle aspirate of the lymph node was performed on October 19th.  That  showed metastatic carcinoma consistent with breast primary which was ER negative, NV negative and HER 2-negative.    She then received 3 cycles of Nab paclitaxel and atezolizumab.  She last received treatment on January 3, 2019.    PET-CT in March, 2019 showed complete response.    She then had consolidative radiation therapy in May 2019.    Her treatment has been complicated by the development of dermatomyositis which resulted in the lengthy hospitalization from January 22nd to February 13th, 2019.  She is followed by Rheumatology and has been treated with steroids, IVIG, and low-dose methotrexate, and Cell-cept.  She received rituximab therapy 4/13/2- and 4/27/20.  Review of Systems   Constitutional: Negative for appetite change and unexpected weight change.   Eyes: Negative for visual disturbance.   Respiratory: Negative for cough and shortness of breath.    Cardiovascular: Negative for chest pain.   Gastrointestinal: Negative for abdominal pain and diarrhea.   Genitourinary: Negative for frequency.   Musculoskeletal: Positive for arthralgias. Negative for back pain.   Skin: Positive for rash ( improved).   Neurological: Positive for weakness (Improved). Negative for headaches.   Hematological: Negative for adenopathy.   Psychiatric/Behavioral: The patient is not nervous/anxious.        Objective:      Physical Exam  Vitals signs reviewed.   Constitutional:       General: She is not in acute distress.     Appearance: She is well-developed.   Eyes:      General: No scleral icterus.  Cardiovascular:      Rate and Rhythm: Normal rate and regular rhythm.   Pulmonary:      Effort: Pulmonary effort is normal.      Breath sounds: Normal breath sounds. No wheezing or rales.   Chest:      Breasts:         Right: No mass, nipple discharge or skin change.         Abdominal:      Palpations: Abdomen is soft. There is no mass.      Tenderness: There is no abdominal tenderness.   Lymphadenopathy:      Cervical: No cervical  adenopathy.      Upper Body:      Right upper body: No supraclavicular or axillary adenopathy.      Left upper body: No supraclavicular or axillary adenopathy.   Skin:     Findings: Rash (Erythema of the face) present.   Neurological:      Mental Status: She is alert and oriented to person, place, and time.   Psychiatric:         Behavior: Behavior normal.         Thought Content: Thought content normal.         Assessment:     CT shows no evidence of recurrent disease.  CBC - WBC 2140 with   1. Malignant neoplasm of upper-outer quadrant of left breast in female, estrogen receptor negative        Plan:       Repeat CBC today - .  F/U with Rheumatology  Return in 3 months with labs and scans.      [place]

## 2020-10-12 ENCOUNTER — OFFICE VISIT (OUTPATIENT)
Dept: HEMATOLOGY/ONCOLOGY | Facility: CLINIC | Age: 60
End: 2020-10-12
Payer: COMMERCIAL

## 2020-10-12 ENCOUNTER — PATIENT MESSAGE (OUTPATIENT)
Dept: RHEUMATOLOGY | Facility: CLINIC | Age: 60
End: 2020-10-12

## 2020-10-12 ENCOUNTER — LAB VISIT (OUTPATIENT)
Dept: LAB | Facility: HOSPITAL | Age: 60
End: 2020-10-12
Attending: INTERNAL MEDICINE
Payer: COMMERCIAL

## 2020-10-12 VITALS
TEMPERATURE: 98 F | HEART RATE: 94 BPM | WEIGHT: 175.94 LBS | BODY MASS INDEX: 29.31 KG/M2 | RESPIRATION RATE: 16 BRPM | OXYGEN SATURATION: 100 % | HEIGHT: 65 IN | SYSTOLIC BLOOD PRESSURE: 149 MMHG | DIASTOLIC BLOOD PRESSURE: 73 MMHG

## 2020-10-12 DIAGNOSIS — C50.412 MALIGNANT NEOPLASM OF UPPER-OUTER QUADRANT OF LEFT BREAST IN FEMALE, ESTROGEN RECEPTOR NEGATIVE: Chronic | ICD-10-CM

## 2020-10-12 DIAGNOSIS — D70.9 NEUTROPENIA, UNSPECIFIED TYPE: Primary | ICD-10-CM

## 2020-10-12 DIAGNOSIS — Z17.1 MALIGNANT NEOPLASM OF UPPER-OUTER QUADRANT OF LEFT BREAST IN FEMALE, ESTROGEN RECEPTOR NEGATIVE: Primary | Chronic | ICD-10-CM

## 2020-10-12 DIAGNOSIS — C50.412 MALIGNANT NEOPLASM OF UPPER-OUTER QUADRANT OF LEFT BREAST IN FEMALE, ESTROGEN RECEPTOR NEGATIVE: Primary | Chronic | ICD-10-CM

## 2020-10-12 DIAGNOSIS — Z17.1 MALIGNANT NEOPLASM OF UPPER-OUTER QUADRANT OF LEFT BREAST IN FEMALE, ESTROGEN RECEPTOR NEGATIVE: Chronic | ICD-10-CM

## 2020-10-12 LAB
ANISOCYTOSIS BLD QL SMEAR: SLIGHT
BASOPHILS # BLD AUTO: 0.02 K/UL (ref 0–0.2)
BASOPHILS NFR BLD: 0.7 % (ref 0–1.9)
DIFFERENTIAL METHOD: ABNORMAL
EOSINOPHIL # BLD AUTO: 0.1 K/UL (ref 0–0.5)
EOSINOPHIL NFR BLD: 3.3 % (ref 0–8)
ERYTHROCYTE [DISTWIDTH] IN BLOOD BY AUTOMATED COUNT: 17 % (ref 11.5–14.5)
HCT VFR BLD AUTO: 37.9 % (ref 37–48.5)
HGB BLD-MCNC: 11.5 G/DL (ref 12–16)
HYPOCHROMIA BLD QL SMEAR: ABNORMAL
IMM GRANULOCYTES # BLD AUTO: 0 K/UL (ref 0–0.04)
IMM GRANULOCYTES NFR BLD AUTO: 0 % (ref 0–0.5)
LYMPHOCYTES # BLD AUTO: 1.4 K/UL (ref 1–4.8)
LYMPHOCYTES NFR BLD: 52.2 % (ref 18–48)
MCH RBC QN AUTO: 26.2 PG (ref 27–31)
MCHC RBC AUTO-ENTMCNC: 30.3 G/DL (ref 32–36)
MCV RBC AUTO: 86 FL (ref 82–98)
MONOCYTES # BLD AUTO: 0.6 K/UL (ref 0.3–1)
MONOCYTES NFR BLD: 20.6 % (ref 4–15)
NEUTROPHILS # BLD AUTO: 0.6 K/UL (ref 1.8–7.7)
NEUTROPHILS NFR BLD: 23.2 % (ref 38–73)
NRBC BLD-RTO: 0 /100 WBC
OVALOCYTES BLD QL SMEAR: ABNORMAL
PLATELET # BLD AUTO: 173 K/UL (ref 150–350)
PMV BLD AUTO: 8.7 FL (ref 9.2–12.9)
POIKILOCYTOSIS BLD QL SMEAR: SLIGHT
POLYCHROMASIA BLD QL SMEAR: ABNORMAL
RBC # BLD AUTO: 4.39 M/UL (ref 4–5.4)
WBC # BLD AUTO: 2.72 K/UL (ref 3.9–12.7)

## 2020-10-12 PROCEDURE — 99999 PR PBB SHADOW E&M-EST. PATIENT-LVL V: ICD-10-PCS | Mod: PBBFAC,,, | Performed by: INTERNAL MEDICINE

## 2020-10-12 PROCEDURE — 3078F DIAST BP <80 MM HG: CPT | Mod: CPTII,S$GLB,, | Performed by: INTERNAL MEDICINE

## 2020-10-12 PROCEDURE — 3077F PR MOST RECENT SYSTOLIC BLOOD PRESSURE >= 140 MM HG: ICD-10-PCS | Mod: CPTII,S$GLB,, | Performed by: INTERNAL MEDICINE

## 2020-10-12 PROCEDURE — 99214 PR OFFICE/OUTPT VISIT, EST, LEVL IV, 30-39 MIN: ICD-10-PCS | Mod: S$GLB,,, | Performed by: INTERNAL MEDICINE

## 2020-10-12 PROCEDURE — 3077F SYST BP >= 140 MM HG: CPT | Mod: CPTII,S$GLB,, | Performed by: INTERNAL MEDICINE

## 2020-10-12 PROCEDURE — 85025 COMPLETE CBC W/AUTO DIFF WBC: CPT

## 2020-10-12 PROCEDURE — 99214 OFFICE O/P EST MOD 30 MIN: CPT | Mod: S$GLB,,, | Performed by: INTERNAL MEDICINE

## 2020-10-12 PROCEDURE — 3078F PR MOST RECENT DIASTOLIC BLOOD PRESSURE < 80 MM HG: ICD-10-PCS | Mod: CPTII,S$GLB,, | Performed by: INTERNAL MEDICINE

## 2020-10-12 PROCEDURE — 99999 PR PBB SHADOW E&M-EST. PATIENT-LVL V: CPT | Mod: PBBFAC,,, | Performed by: INTERNAL MEDICINE

## 2020-10-12 PROCEDURE — 36415 COLL VENOUS BLD VENIPUNCTURE: CPT

## 2020-10-12 PROCEDURE — 3008F PR BODY MASS INDEX (BMI) DOCUMENTED: ICD-10-PCS | Mod: CPTII,S$GLB,, | Performed by: INTERNAL MEDICINE

## 2020-10-12 PROCEDURE — 3008F BODY MASS INDEX DOCD: CPT | Mod: CPTII,S$GLB,, | Performed by: INTERNAL MEDICINE

## 2020-10-12 RX ORDER — LEVOCETIRIZINE DIHYDROCHLORIDE 5 MG/1
TABLET, FILM COATED ORAL
COMMUNITY
Start: 2020-10-02 | End: 2020-12-11

## 2020-10-13 ENCOUNTER — PATIENT MESSAGE (OUTPATIENT)
Dept: RHEUMATOLOGY | Facility: CLINIC | Age: 60
End: 2020-10-13

## 2020-10-19 ENCOUNTER — PATIENT MESSAGE (OUTPATIENT)
Dept: RHEUMATOLOGY | Facility: CLINIC | Age: 60
End: 2020-10-19

## 2020-10-21 ENCOUNTER — PATIENT MESSAGE (OUTPATIENT)
Dept: RHEUMATOLOGY | Facility: CLINIC | Age: 60
End: 2020-10-21

## 2020-10-27 ENCOUNTER — INFUSION (OUTPATIENT)
Dept: INFECTIOUS DISEASES | Facility: HOSPITAL | Age: 60
End: 2020-10-27
Attending: INTERNAL MEDICINE
Payer: COMMERCIAL

## 2020-10-27 VITALS
SYSTOLIC BLOOD PRESSURE: 129 MMHG | HEIGHT: 65 IN | HEART RATE: 94 BPM | WEIGHT: 172.31 LBS | DIASTOLIC BLOOD PRESSURE: 67 MMHG | OXYGEN SATURATION: 99 % | RESPIRATION RATE: 16 BRPM | TEMPERATURE: 98 F | BODY MASS INDEX: 28.71 KG/M2

## 2020-10-27 DIAGNOSIS — M33.13 DERMATOMYOSITIS: Primary | ICD-10-CM

## 2020-10-27 DIAGNOSIS — R13.19 ESOPHAGEAL DYSPHAGIA: ICD-10-CM

## 2020-10-27 PROCEDURE — 96366 THER/PROPH/DIAG IV INF ADDON: CPT

## 2020-10-27 PROCEDURE — 96367 TX/PROPH/DG ADDL SEQ IV INF: CPT

## 2020-10-27 PROCEDURE — 96365 THER/PROPH/DIAG IV INF INIT: CPT

## 2020-10-27 PROCEDURE — 63600175 PHARM REV CODE 636 W HCPCS: Performed by: INTERNAL MEDICINE

## 2020-10-27 PROCEDURE — 96375 TX/PRO/DX INJ NEW DRUG ADDON: CPT

## 2020-10-27 PROCEDURE — 25000003 PHARM REV CODE 250: Performed by: INTERNAL MEDICINE

## 2020-10-27 RX ORDER — FAMOTIDINE 10 MG/ML
20 INJECTION INTRAVENOUS
Status: COMPLETED | OUTPATIENT
Start: 2020-10-27 | End: 2020-10-27

## 2020-10-27 RX ORDER — HEPARIN 100 UNIT/ML
500 SYRINGE INTRAVENOUS
Status: DISCONTINUED | OUTPATIENT
Start: 2020-10-27 | End: 2020-10-27 | Stop reason: HOSPADM

## 2020-10-27 RX ORDER — SODIUM CHLORIDE 0.9 % (FLUSH) 0.9 %
10 SYRINGE (ML) INJECTION
Status: CANCELLED | OUTPATIENT
Start: 2020-11-24

## 2020-10-27 RX ORDER — FAMOTIDINE 10 MG/ML
20 INJECTION INTRAVENOUS
Status: CANCELLED | OUTPATIENT
Start: 2020-11-24

## 2020-10-27 RX ORDER — ACETAMINOPHEN 325 MG/1
650 TABLET ORAL
Status: COMPLETED | OUTPATIENT
Start: 2020-10-27 | End: 2020-10-27

## 2020-10-27 RX ORDER — ACETAMINOPHEN 325 MG/1
650 TABLET ORAL
Status: CANCELLED | OUTPATIENT
Start: 2020-11-24

## 2020-10-27 RX ORDER — HEPARIN 100 UNIT/ML
500 SYRINGE INTRAVENOUS
Status: CANCELLED | OUTPATIENT
Start: 2020-11-24

## 2020-10-27 RX ORDER — SODIUM CHLORIDE 0.9 % (FLUSH) 0.9 %
10 SYRINGE (ML) INJECTION
Status: DISCONTINUED | OUTPATIENT
Start: 2020-10-27 | End: 2020-10-27 | Stop reason: HOSPADM

## 2020-10-27 RX ADMIN — ACETAMINOPHEN 650 MG: 325 TABLET ORAL at 09:10

## 2020-10-27 RX ADMIN — SODIUM CHLORIDE: 0.9 INJECTION, SOLUTION INTRAVENOUS at 09:10

## 2020-10-27 RX ADMIN — FAMOTIDINE 20 MG: 10 INJECTION INTRAVENOUS at 09:10

## 2020-10-27 RX ADMIN — HUMAN IMMUNOGLOBULIN G 80 G: 40 LIQUID INTRAVENOUS at 10:10

## 2020-10-27 RX ADMIN — DIPHENHYDRAMINE HYDROCHLORIDE 50 MG: 50 INJECTION INTRAMUSCULAR; INTRAVENOUS at 09:10

## 2020-10-27 NOTE — PROGRESS NOTES
Pt arrived to Infusion suite with Lupus flair, face and upper torso red, with swelling to both eyelids noted, denies SOB, on steroid daily.    Pt arrived to Infusion suite for 1/2 Privigen 85 gms, received Tylenol 650 mg PO, Pepcid 20 mg IVP, and Benadryl 50 mg IVPB 30 mins prior to infusion. NS @ 25 ml/hr ran concurrently with Privigen.  Started infusion @ 48cc/hr and max rate of 240 cc/hr, tolerated infusion well and left in NAD.

## 2020-10-29 ENCOUNTER — INFUSION (OUTPATIENT)
Dept: INFECTIOUS DISEASES | Facility: HOSPITAL | Age: 60
End: 2020-10-29
Attending: INTERNAL MEDICINE
Payer: COMMERCIAL

## 2020-10-29 VITALS
BODY MASS INDEX: 28.85 KG/M2 | OXYGEN SATURATION: 97 % | WEIGHT: 173.38 LBS | RESPIRATION RATE: 16 BRPM | HEART RATE: 100 BPM | SYSTOLIC BLOOD PRESSURE: 140 MMHG | TEMPERATURE: 98 F | DIASTOLIC BLOOD PRESSURE: 79 MMHG

## 2020-10-29 DIAGNOSIS — R13.19 ESOPHAGEAL DYSPHAGIA: ICD-10-CM

## 2020-10-29 DIAGNOSIS — M33.13 DERMATOMYOSITIS: Primary | ICD-10-CM

## 2020-10-29 PROCEDURE — 63600175 PHARM REV CODE 636 W HCPCS: Performed by: INTERNAL MEDICINE

## 2020-10-29 PROCEDURE — 96375 TX/PRO/DX INJ NEW DRUG ADDON: CPT

## 2020-10-29 PROCEDURE — 25000003 PHARM REV CODE 250: Performed by: INTERNAL MEDICINE

## 2020-10-29 PROCEDURE — 96367 TX/PROPH/DG ADDL SEQ IV INF: CPT

## 2020-10-29 PROCEDURE — 96366 THER/PROPH/DIAG IV INF ADDON: CPT

## 2020-10-29 PROCEDURE — 96365 THER/PROPH/DIAG IV INF INIT: CPT

## 2020-10-29 RX ORDER — FAMOTIDINE 10 MG/ML
20 INJECTION INTRAVENOUS
Status: COMPLETED | OUTPATIENT
Start: 2020-10-29 | End: 2020-10-29

## 2020-10-29 RX ORDER — FAMOTIDINE 10 MG/ML
20 INJECTION INTRAVENOUS
Status: CANCELLED | OUTPATIENT
Start: 2020-11-24

## 2020-10-29 RX ORDER — SODIUM CHLORIDE 0.9 % (FLUSH) 0.9 %
10 SYRINGE (ML) INJECTION
Status: DISCONTINUED | OUTPATIENT
Start: 2020-10-29 | End: 2020-10-29 | Stop reason: HOSPADM

## 2020-10-29 RX ORDER — ACETAMINOPHEN 325 MG/1
650 TABLET ORAL
Status: CANCELLED | OUTPATIENT
Start: 2020-11-24

## 2020-10-29 RX ORDER — HEPARIN 100 UNIT/ML
500 SYRINGE INTRAVENOUS
Status: CANCELLED | OUTPATIENT
Start: 2020-11-24

## 2020-10-29 RX ORDER — ACETAMINOPHEN 325 MG/1
650 TABLET ORAL
Status: COMPLETED | OUTPATIENT
Start: 2020-10-29 | End: 2020-10-29

## 2020-10-29 RX ORDER — SODIUM CHLORIDE 0.9 % (FLUSH) 0.9 %
10 SYRINGE (ML) INJECTION
Status: CANCELLED | OUTPATIENT
Start: 2020-11-24

## 2020-10-29 RX ADMIN — FAMOTIDINE 20 MG: 10 INJECTION INTRAVENOUS at 08:10

## 2020-10-29 RX ADMIN — HUMAN IMMUNOGLOBULIN G 80 G: 40 LIQUID INTRAVENOUS at 09:10

## 2020-10-29 RX ADMIN — ACETAMINOPHEN 650 MG: 325 TABLET ORAL at 08:10

## 2020-10-29 RX ADMIN — SODIUM CHLORIDE: 0.9 INJECTION, SOLUTION INTRAVENOUS at 08:10

## 2020-10-29 RX ADMIN — DIPHENHYDRAMINE HYDROCHLORIDE 50 MG: 50 INJECTION, SOLUTION INTRAMUSCULAR; INTRAVENOUS at 08:10

## 2020-10-29 NOTE — PROGRESS NOTES
Pt arrived to Infusion suite with Lupus flair, face and upper torso red, with swelling to both eyelids noted, denies SOB, on steroid daily.    Pt arrived to Infusion suite for 2/2 Privigen 80 gms, received tylenol 650 mg po, pepcid 20 mg ivp, and benadryl 50mg/50cc NS ivpb 30 mins prior to Privigen infusion. Started infusion @ 48cc/hr and max rate of 240 cc/hr, tolerated well.

## 2020-11-02 ENCOUNTER — SPECIALTY PHARMACY (OUTPATIENT)
Dept: PHARMACY | Facility: CLINIC | Age: 60
End: 2020-11-02

## 2020-11-02 NOTE — TELEPHONE ENCOUNTER
Specialty Pharmacy - Refill Coordination    Specialty Medication Orders Linked to Encounter      Most Recent Value   Medication #1  tofacitinib (XELJANZ) 5 mg Tab (Order#[id number], Rx#[id number])              Current Outpatient Medications   Medication Sig    amLODIPine (NORVASC) 10 MG tablet Take 1 tablet (10 mg total) by mouth once daily.    azelastine (ASTELIN) 137 mcg (0.1 %) nasal spray 1 spray (137 mcg total) by Nasal route 2 (two) times daily.    calcium carbonate (OS-ESTELA) 600 mg calcium (1,500 mg) Tab Take 600 mg by mouth 2 (two) times daily with meals.    clobetasoL (TEMOVATE) 0.05 % cream Apply topically 2 (two) times daily.    levocetirizine (XYZAL) 5 MG tablet     predniSONE (DELTASONE) 1 MG tablet 4 mg daily for a week,3 mg daily for a week,2 mg daily for a week,1 mg daily for a week and stop    tofacitinib (XELJANZ) 5 mg Tab Take 5 mg by mouth 2 (two) times daily.    triamcinolone acetonide 0.025% (KENALOG) 0.025 % cream AAA on face BID x 1-2 wks then prn flares only    vitamin D (VITAMIN D3) 1000 units Tab Take 1,000 Units by mouth once daily.   Last reviewed on 11/2/2020  3:22 PM by Marivel Rausch    Review of patient's allergies indicates:  No Known Allergies Last reviewed on  11/2/2020 3:22 PM by Marivel Rausch      Tasks added this encounter   No tasks added.   Tasks due within next 3 months   11/1/2020 - Clinical - Follow Up Assesement (90 day)  11/1/2020 - Refill Call   Pt answered and stated that her Dr had changed her dose to 1 a day so she has about 20 tabs on hand currently. I'm alerting her [facility].  Marivel Rausch  [facility]  [address] [place] [id number]  Phone: [phone]  Fax: [phone]

## 2020-11-13 ENCOUNTER — SPECIALTY PHARMACY (OUTPATIENT)
Dept: PHARMACY | Facility: CLINIC | Age: 60
End: 2020-11-13

## 2020-11-13 NOTE — TELEPHONE ENCOUNTER
11/13 WWB  Incoming call from pt for refill for xeljanz. Pt reports that she has 15 doses on hand and pt needs a PA this email has been sent to PA team. [facility] will follow up on 11/20 to schedule. Specialty Pharmacy - Refill Coordination    Specialty Medication Orders Linked to Encounter      Most Recent Value   Medication #1  tofacitinib (XELJANZ) 5 mg Tab (Order#[id number], Rx#[id number])              Current Outpatient Medications   Medication Sig    amLODIPine (NORVASC) 10 MG tablet Take 1 tablet (10 mg total) by mouth once daily.    azelastine (ASTELIN) 137 mcg (0.1 %) nasal spray 1 spray (137 mcg total) by Nasal route 2 (two) times daily.    calcium carbonate (OS-ESTELA) 600 mg calcium (1,500 mg) Tab Take 600 mg by mouth 2 (two) times daily with meals.    clobetasoL (TEMOVATE) 0.05 % cream Apply topically 2 (two) times daily.    levocetirizine (XYZAL) 5 MG tablet     predniSONE (DELTASONE) 1 MG tablet 4 mg daily for a week,3 mg daily for a week,2 mg daily for a week,1 mg daily for a week and stop    tofacitinib (XELJANZ) 5 mg Tab Take 5 mg by mouth 2 (two) times daily.    triamcinolone acetonide 0.025% (KENALOG) 0.025 % cream AAA on face BID x 1-2 wks then prn flares only    vitamin D (VITAMIN D3) 1000 units Tab Take 1,000 Units by mouth once daily.   Last reviewed on 11/2/2020  3:22 PM by Marivel Rausch    Review of patient's allergies indicates:  No Known Allergies Last reviewed on  11/2/2020 3:22 PM by Marivel Rausch      Tasks added this encounter   No tasks added.   Tasks due within next 3 months   11/1/2020 - Clinical - Follow Up Assesement (90 day)  11/20/2020 - Refill Call   11/13 WWB  Incoming call from pt for refill for xeljanz. Pt reports that she has 15 doses on hand and pt needs a PA this email has been sent to PA team. [facility] will follow up on 11/20 to schedule.     Alea IsaacsElisa  [facility] - [facility] Pharm[facility]  [address] [facility] [place] [address]  Phone:  [phone]  Fax: [phone]

## 2020-11-16 NOTE — TELEPHONE ENCOUNTER
DOCUMENTATION ONLY:    XELJANZ 5mg REAUTHORIZATION submitted via CMM. (Key: [id number]).    Katia Shrestha, PharmD  Clinical Pharmacist  [facility]   [phone]

## 2020-11-17 ENCOUNTER — PATIENT MESSAGE (OUTPATIENT)
Dept: RHEUMATOLOGY | Facility: CLINIC | Age: 60
End: 2020-11-17

## 2020-11-18 DIAGNOSIS — M60.9 MYOSITIS, UNSPECIFIED MYOSITIS TYPE, UNSPECIFIED SITE: Primary | ICD-10-CM

## 2020-11-18 NOTE — TELEPHONE ENCOUNTER
DOCUMENTATION ONLY:    XELJANZ 5mg REAUTHORIZATION  DENIED due to non- FDA approved diagnosis. PA#[id number].    Will proceed with URGENT appeal.     Katia Shrestha, PharmD  Clinical Pharmacist  [facility]   [phone]

## 2020-11-19 ENCOUNTER — TELEPHONE (OUTPATIENT)
Dept: RHEUMATOLOGY | Facility: CLINIC | Age: 60
End: 2020-11-19

## 2020-11-19 NOTE — TELEPHONE ENCOUNTER
Faxed Appeal for Xeljanz 5MG continuation of therapy reconsideration as URGENT to [employer] Clinical Appeals Department ([phone]).     Katia Shrestha, PharmD  Clinical Pharmacist  [facility]   [phone]

## 2020-11-23 ENCOUNTER — SPECIALTY PHARMACY (OUTPATIENT)
Dept: PHARMACY | Facility: CLINIC | Age: 60
End: 2020-11-23

## 2020-11-24 ENCOUNTER — OFFICE VISIT (OUTPATIENT)
Dept: RHEUMATOLOGY | Facility: CLINIC | Age: 60
End: 2020-11-24
Payer: COMMERCIAL

## 2020-11-24 VITALS
HEIGHT: 65 IN | WEIGHT: 175.69 LBS | BODY MASS INDEX: 29.27 KG/M2 | HEART RATE: 108 BPM | SYSTOLIC BLOOD PRESSURE: 133 MMHG | DIASTOLIC BLOOD PRESSURE: 76 MMHG

## 2020-11-24 DIAGNOSIS — M33.13 DERMATOMYOSITIS: Primary | ICD-10-CM

## 2020-11-24 PROCEDURE — 3008F PR BODY MASS INDEX (BMI) DOCUMENTED: ICD-10-PCS | Mod: CPTII,S$GLB,, | Performed by: INTERNAL MEDICINE

## 2020-11-24 PROCEDURE — 3008F BODY MASS INDEX DOCD: CPT | Mod: CPTII,S$GLB,, | Performed by: INTERNAL MEDICINE

## 2020-11-24 PROCEDURE — 3075F PR MOST RECENT SYSTOLIC BLOOD PRESS GE 130-139MM HG: ICD-10-PCS | Mod: CPTII,S$GLB,, | Performed by: INTERNAL MEDICINE

## 2020-11-24 PROCEDURE — 1125F AMNT PAIN NOTED PAIN PRSNT: CPT | Mod: S$GLB,,, | Performed by: INTERNAL MEDICINE

## 2020-11-24 PROCEDURE — 3078F PR MOST RECENT DIASTOLIC BLOOD PRESSURE < 80 MM HG: ICD-10-PCS | Mod: CPTII,S$GLB,, | Performed by: INTERNAL MEDICINE

## 2020-11-24 PROCEDURE — 99214 OFFICE O/P EST MOD 30 MIN: CPT | Mod: S$GLB,,, | Performed by: INTERNAL MEDICINE

## 2020-11-24 PROCEDURE — 3078F DIAST BP <80 MM HG: CPT | Mod: CPTII,S$GLB,, | Performed by: INTERNAL MEDICINE

## 2020-11-24 PROCEDURE — 99999 PR PBB SHADOW E&M-EST. PATIENT-LVL III: ICD-10-PCS | Mod: PBBFAC,,, | Performed by: INTERNAL MEDICINE

## 2020-11-24 PROCEDURE — 1125F PR PAIN SEVERITY QUANTIFIED, PAIN PRESENT: ICD-10-PCS | Mod: S$GLB,,, | Performed by: INTERNAL MEDICINE

## 2020-11-24 PROCEDURE — 3075F SYST BP GE 130 - 139MM HG: CPT | Mod: CPTII,S$GLB,, | Performed by: INTERNAL MEDICINE

## 2020-11-24 PROCEDURE — 99214 PR OFFICE/OUTPT VISIT, EST, LEVL IV, 30-39 MIN: ICD-10-PCS | Mod: S$GLB,,, | Performed by: INTERNAL MEDICINE

## 2020-11-24 PROCEDURE — 99999 PR PBB SHADOW E&M-EST. PATIENT-LVL III: CPT | Mod: PBBFAC,,, | Performed by: INTERNAL MEDICINE

## 2020-11-24 RX ORDER — PNV NO.95/FERROUS FUM/FOLIC AC 28MG-0.8MG
1000 TABLET ORAL DAILY
COMMUNITY
End: 2021-12-16

## 2020-11-24 RX ORDER — MAGNESIUM 30 MG
1 TABLET ORAL DAILY
COMMUNITY

## 2020-11-24 NOTE — PROGRESS NOTES
Rapid3 Question Responses and Scores 11/20/2020   MDHAQ Score 0.8   Psychologic Score 3.3   Pain Score 2   When you awakened in the morning OVER THE LAST WEEK, did you feel stiff? Yes   If Yes, please indicate the number of hours until you are as limber as you will be for the day 2   Fatigue Score 2   Global Health Score 3   RAPID3 Score 2.55     Answers for HPI/ROS submitted by the patient on 11/20/2020   fever: No  eye redness: No  mouth sores: No  headaches: No  shortness of breath: No  chest pain: No  trouble swallowing: No  diarrhea: No  constipation: No  unexpected weight change: No  genital sore: No  dysuria: No  During the last 3 days, have you had a skin rash?: No  Bruises or bleeds easily: No  cough: No

## 2020-11-24 NOTE — PROGRESS NOTES
RHEUMATOLOGY CLINIC FOLLOW UP VISIT  Chief complaints:-  To follow up for Myositis follow up     HPI:-  Eremlinda Javed a 59 y.o. pleasant female  with history of L sided infiltrating ductal carcinoma of the L breast (dx on Jan 2017), HTN, depression, gastritis, and recently diagnosed myositis (uncertain if it's autoimmune vs medication induced), present today with concern about myositis flare     Patient was initially send from hem/onc clinic for evaluation of possible inflammatory myositis.  Patient was hospitalized from 1/22/19 till 2/13/19 for myositis.      L breast cancer s/p completion of 4 cycles of demi-adjuvant taxotere and cytoxan (completed on 5/9/17) and mastectomy (6/26/17) and then 4 cycles of adjuvant Adriamycin (completed 9/12/17). In 10/2017 - patient developed L supraclavicular lymphadenopathy which FNA confirmed as reoccurrence of previously treated breast cancer.      Patient started on Atelizumab/Abraxane on 11/7/18. Per chart review, patient noticed rashes on the dorsum of her hands on 11/11/18. Seen in urgent care and given topical steroid cream. Then received two more infusions on Atelizumab/Abraxane on 11/21/18 and 12/5/18. Started to notice puffiness around the eyes on 12/11/18. Received another Abraxane infusion on 12/12/18 but this time with hydrocortisone 50 mg which helped reduce the swelling around the eyes. Developed swelling of the face again on 12/15/18. Next infusion of Abraxane done on 12/19/18 with Solucortef and Atelizumab held. Abraxane given again on 1/3/19 with no IV steroid. Patient developed swelling of the face on 1/4/19 and went to urgent care and given short course of prednisone 20 mg BID. On 1/15/19, patient seen in hem/onc clinic with c/o of pressure and tightness around neck. CT scan of chest and neck did not reveal any vascular compression. Started on prednisone 60 mg with taper. On 1/22/18 patient with c/o  proximal muscle weakness. CPK found to be elevated around 4k. Patient admitted and given solumedrol 80 mg IV on 1/22/18. Last infusion of Atelizumab on 12/19/18. Last infusion of Abraxane on 1/3/19. Given Solumedrol 1g x 1 on 1/23/18. Seen by Dermatology on 1/24/18 and biopsy done of skin rash. Given distribution of rashes, there was concern for dermatomyositis. Patient started on Solumedrol 125 mg IV BID at this time.      During her hospitalization patient was started on high dose steroid Solumedrol 80mg IV x 1, 1g x 1, and then 125mg BID until tapered down to medrol 48mg.  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  Patient was started on IVIG (400mg/kg/daily x 5 day) 1/29/19-2/2.   Patient started on MTX 20mg SQ (Feb 5 2019) with folic acid. MTX SQ was transitioned to PO because concern about rehab administration.  Patient failed swallow eval and PEG tube was placed.        Denies any family history of autoimmune diseases.     No smoking, EtOH, recreational drug usage.     Denies any photosensitivity, joint swelling, unintentional weigh loss, abdominal pain, night sweats, CP, SOB.  +oral thrush.      Completed rehab at [facility].  Completed IVIG (2/28 and 3/1).  Had mild HA after infusion.  Doing well.  A lot stronger - able to do household chores since discharge.  Not back at baseline yet ~80%.  Mild weakness with increased activities.  Able to ambulate without assistance (but is still a fall precaution).  Tolerating diet via PEG tube.  Completed rehab.      MTX discontinued 2/18 with concern of toxicity (decrease blood counts and transaminatis).  Folic acid increased to 4mg daily.  Still on medrol 32mg daily with atorvaquone for PCP prophylaxis.  Bactrim d/c because of interaction with leucovorin.  Leucovorin 5mg daily started - Leucovorin discontinued.  Continues to be on folic acid 4mg daily     Radiation completed (28 days) completed May 8.       EGD/colonoscopy done on July 29 - normal.  PEG  tube removed     Last PET scan (June 20) showed negative for metastasis.  At this time, will monitor per oncology and repeat PET scan in Sept.  No treatment needed at this time.      Rash was biopsied and evaluated by dermatology.   Biopsy report conclusive of dermatomyositis.  Was given topical steroid which provided minimal alleviation of symptoms.  +paco      6/2020    Completed Rituxan on 4/27 (1g x 2).  Had a flare after infusion requiring increase of steroid.  Since then patient had been doing well - improving of myalgia and rash.  Still having mild edema and generalized weakness (especially in the afternoon/evening).       Patient was started on HCQ - compliant on all home medications.  Continues to work out daily.  Finds most difficulty with getting in and out of the car.   Denies any dysphagia, alopecia, arthralgia, abdominal pain, CP, SOB, N/V, chills, or urinary symptoms.      7/2020    Rash all over her body  Red rash on the left leg  Face once again has erythema and heliotrope rash  Prednisone down to 10 mg and looks like she has a flare again  On plaquenil 200 mg bid  Wears sunscreen  Avoids the direct sun    Most recent labs     Ck nml  Aldolase nml  ESR 53  CRP nml  Mild anemia  AST 67      9/2020    She started xeljanz 5 mg bid mid august  She feels well  Swelling is better  Her weakness and fatigue is all gone  Face still looks red   Chest is all better and upper arm is great  Itch and rash seems better   Rash on the legs is gone     CK,aldolase nml  CRP nml   now   AST 61  GFR nml  ALT nml    plts 145 but stable  H/h 11.6/35.3    But the white count has dropped to 2.70    It has been low on and off since 2019 so this is not new     She has a sinus issue going on   She has greenish d/c  She has a tinge of blood       10/12/2020  We cut the xeljanz dose to 5 mg daily  No more swelling  Face looks clear,not much redness  The eyelid is not puffy and not intensely erythematous   Along the  nasal folds there is some redness but again not intense  On the MCPs and Dorsal hands she has some erythema which is more pinkish  Not itchy  Sometimes these pink areas can turn pigmented  Now complaint to sunscreen      White count has improved from 2.72 to 2.80  H/h 11.3/34.7  plts nml  ANC has improved from 0.6 to 1.4  Lymphocyte count 0.9    Review of Systems   Constitutional: Negative for chills, diaphoresis, fever, malaise/fatigue and weight loss.   HENT: Negative for congestion, ear discharge, ear pain, hearing loss, nosebleeds, sinus pain and tinnitus.    Eyes: Positive for discharge. Negative for photophobia, pain and redness.   Respiratory: Negative for cough, hemoptysis, sputum production, shortness of breath, wheezing and stridor.    Cardiovascular: Negative for chest pain, palpitations, orthopnea, claudication, leg swelling and PND.   Gastrointestinal: Positive for constipation. Negative for abdominal pain, diarrhea, heartburn, nausea and vomiting.   Genitourinary: Negative for dysuria, frequency, hematuria and urgency.   Musculoskeletal: Positive for myalgias. Negative for back pain, joint pain and neck pain.   Skin: Positive for rash.   Neurological: Negative for dizziness, tingling, tremors, weakness and headaches.   Endo/Heme/Allergies: Does not bruise/bleed easily.   Psychiatric/Behavioral: Negative for depression, hallucinations and suicidal ideas. The patient is not nervous/anxious and does not have insomnia.        Past Medical History:   Diagnosis Date    Breast cancer 2017    left    Depression     Dermatomyositis     Diverticulosis     Gastritis     Hypertension     Vitamin B12 deficiency 3/8/2018       Past Surgical History:   Procedure Laterality Date    BREAST BIOPSY Left     BREAST RECONSTRUCTION Left 2017     SECTION      COLONOSCOPY  2011    repeat in 10 yrs.    COLONOSCOPY N/A 2019    Procedure: COLONOSCOPY;  Surgeon: Pernell Rosas MD;  Location: [facility]  ENDO (4TH FLR);  Service: Endoscopy;  Laterality: N/A;  Patient would like to use her PEG tube for bowel prep and then have her PEG tube removed after EGD and colonoscopy procedures while she is still sedated.    D&C      ESOPHAGOGASTRODUODENOSCOPY N/A 1/29/2019    Procedure: EGD (ESOPHAGOGASTRODUODENOSCOPY);  Surgeon: Pernell Rosas MD;  Location: [facility] (2ND FLR);  Service: Endoscopy;  Laterality: N/A;    ESOPHAGOGASTRODUODENOSCOPY N/A 7/29/2019    Procedure: EGD (ESOPHAGOGASTRODUODENOSCOPY);  Surgeon: Pernell Rosas MD;  Location: [facility] ENDO (4TH FLR);  Service: Endoscopy;  Laterality: N/A;  EGD for follow-up of erosive esophagitis per Dr. Rosas    Patient would like to use her PEG tube for bowel prep and then have her PEG tube removed after EGD and colonoscopy procedures while she is still sedated.    INSERTION OF TUNNELED CENTRAL VENOUS CATHETER (CVC) WITH SUBCUTANEOUS PORT Right 10/31/2018    Procedure: [id number];  Surgeon: Deo Palencia MD;  [address]  Service: General;  Laterality: Right;    MASTECTOMY Left 06/26/2017    MYOMECTOMY      PORTACATH PLACEMENT      SENTINEL LYMPH NODE BIOPSY  02/2017    left    TOTAL REDUCTION MAMMOPLASTY Right 2017    UPPER GASTROINTESTINAL ENDOSCOPY          Social History     Tobacco Use    Smoking status: Never Smoker    Smokeless tobacco: Never Used   Substance Use Topics    Alcohol use: Yes     Frequency: Never     Drinks per session: Patient refused     Binge frequency: Never     Comment: rare    Drug use: No       Family History   Problem Relation Age of Onset    Heart disease Father     Hypertension Father     Breast cancer Sister 52    Hypertension Mother     No Known Problems Brother     Thyroid disease Daughter         hyperthyroidism s/p thyroidectomy    No Known Problems Son     No Known Problems Brother     No Known Problems Brother     No Known Problems Sister     No Known Problems Daughter     Colon cancer Neg  "Hx     Ovarian cancer Neg Hx     Celiac disease Neg Hx     Cirrhosis Neg Hx     Colon polyps Neg Hx     Esophageal cancer Neg Hx     Inflammatory bowel disease Neg Hx     Liver cancer Neg Hx     Liver disease Neg Hx     Rectal cancer Neg Hx     Stomach cancer Neg Hx     Ulcerative colitis Neg Hx     Melanoma Neg Hx        Review of patient's allergies indicates:  No Known Allergies    Vitals:    11/24/20 0839   BP: 133/76   Pulse: 108   Weight: 79.7 kg (175 lb 11.3 oz)   Height: 5' 5" (1.651 m)   PainSc:   2       Physical Exam   Constitutional: She is oriented to person, place, and time and well-developed, well-nourished, and in no distress.   HENT:   Head: Normocephalic and atraumatic.   Eyes: Pupils are equal, round, and reactive to light. EOM are normal.   Neck: Normal range of motion. Neck supple.   Cardiovascular: Normal rate, regular rhythm and normal heart sounds.   Pulmonary/Chest: Effort normal and breath sounds normal.   Abdominal: Soft. Bowel sounds are normal.   Musculoskeletal: Normal range of motion.   Neurological: She is alert and oriented to person, place, and time. Gait normal. GCS score is 15.   Skin: Skin is warm and dry. No rash noted. No erythema.   guttron's papules on bilateral hands   heliotropic rash      Psychiatric: Mood, memory, affect and judgment normal.     Erythematous rash on the legs     Labs:  Results for ROMEO PEREZ (MRN [id number]) as of 6/17/2020 10:12   Ref. Range 4/22/2020 11:51 4/24/2020 11:43 5/25/2020 09:34 6/10/2020 11:25 6/10/2020 11:26   WBC Latest Ref Range: 3.90 - 12.70 K/uL 4.30  3.60 (L) 4.60    RBC Latest Ref Range: 4.00 - 5.40 M/uL 4.49  4.52 4.47    Hemoglobin Latest Ref Range: 12.0 - 16.0 g/dL 12.1  12.5 12.4    Hematocrit Latest Ref Range: 37.0 - 48.5 % 38.2  38.7 38.4    MCV Latest Ref Range: 82 - 98 fL 85  86 86    MCH Latest Ref Range: 27.0 - 31.0 pg 27.0  27.7 27.8    MCHC Latest Ref Range: 32.0 - 36.0 g/dL 31.8 (L)  32.4 32.4    RDW Latest " Ref Range: 11.5 - 14.5 % 16.0 (H)  17.2 (H) 17.3 (H)    Platelets Latest Ref Range: 150 - 350 K/uL 157  206 164    MPV Latest Ref Range: 7.4 - 10.4 fL 7.3 (L)  7.0 (L) 7.2 (L)    Gran% Latest Ref Range: 38.0 - 73.0 % 66.0  62.8 66.2    Gran # (ANC) Latest Ref Range: 1.8 - 7.7 K/uL 2.8  2.2 3.0    Lymph% Latest Ref Range: 18.0 - 48.0 % 15.3 (L)  15.5 (L) 14.6 (L)    Lymph # Latest Ref Range: 1.0 - 4.8 K/uL 0.7 (L)  0.6 (L) 0.7 (L)    Mono% Latest Ref Range: 4.0 - 15.0 % 11.2  17.7 (H) 14.2    Mono # Latest Ref Range: 0.3 - 1.0 K/uL 0.5  0.6 0.6    Eosinophil% Latest Ref Range: 0.0 - 8.0 % 6.5  3.1 3.9    Eos # Latest Ref Range: 0.0 - 0.5 K/uL 0.3  0.1 0.2    Basophil% Latest Ref Range: 0.0 - 1.9 % 1.0  0.9 1.1    Baso # Latest Ref Range: 0.00 - 0.20 K/uL 0.00  0.00 0.00    nRBC Latest Ref Range: 0 /100 WBC 0  0 0    Differential Method Unknown Automated  Automated Automated    Sed Rate Latest Ref Range: 0 - 30 mm/Hr 56 (H)  91 (H)  73 (H)   Sodium Latest Ref Range: 136 - 145 mmol/L 135 (L)  139 137    Potassium Latest Ref Range: 3.5 - 5.1 mmol/L 3.8  3.8 3.8    Chloride Latest Ref Range: 95 - 110 mmol/L 98  102 102    CO2 Latest Ref Range: 23 - 29 mmol/L 31 (H)  29 29    BUN, Bld Latest Ref Range: 7 - 17 mg/dL 17  12 13    Creatinine Latest Ref Range: 0.70 - 1.20 mg/dL 0.70  0.60 (L) 0.70    eGFR if non African American Latest Ref Range: >60 mL/min/1.73 m^2 >60  >60 >60    eGFR if  Latest Ref Range: >60 mL/min/1.73 m^2 >60  >60 >60    Glucose Latest Ref Range: 74 - 106 mg/dL 114 (H)  89 93    Calcium Latest Ref Range: 8.4 - 10.2 mg/dL 9.8  9.8 9.7    Alkaline Phosphatase Latest Ref Range: 38 - 145 U/L 65  63 58    PROTEIN TOTAL Latest Ref Range: 6.3 - 8.2 g/dL 9.3 (H)  8.9 (H) 8.4 (H)    Albumin Latest Ref Range: 3.5 - 5.2 g/dL 4.1  4.2 4.1    BILIRUBIN TOTAL Latest Ref Range: 0.2 - 1.3 mg/dL 0.4  0.5 0.4    AST Latest Ref Range: 14 - 36 U/L 85 (H)  64 (H) 62 (H)    ALT Latest Ref Range: 10 - 44 U/L  28  26 22    CRP Latest Ref Range: 0.0 - 10.0 mg/L <5.0  <5.0 <5.0    CPK Latest Ref Range: 30 - 135 U/L 115  79 101    Hemoglobin A1C External Latest Ref Range: 4.0 - 6.0 %     6.0   SARS-CoV2 (COVID-19) Qualitative PCR Latest Ref Range: Not Detected   Not Detected      Aldolase Latest Ref Range: < OR = 8.1 U/L 5.9  5.5  4.7     Medication List with Changes/Refills   Current Medications    AMLODIPINE (NORVASC) 10 MG TABLET    Take 1 tablet (10 mg total) by mouth once daily.    AZELASTINE (ASTELIN) 137 MCG (0.1 %) NASAL SPRAY    1 spray (137 mcg total) by Nasal route 2 (two) times daily.    CALCIUM CARBONATE (OS-ESTELA) 600 MG CALCIUM (1,500 MG) TAB    Take 600 mg by mouth 2 (two) times daily with meals.    CLOBETASOL (TEMOVATE) 0.05 % CREAM    Apply topically 2 (two) times daily.    LEVOCETIRIZINE (XYZAL) 5 MG TABLET        MAGNESIUM 30 MG TAB    Take by mouth once.    MULTIVITAMIN CAPSULE    Take 1 capsule by mouth once daily.    TOFACITINIB (XELJANZ) 5 MG TAB    Take 5 mg by mouth 2 (two) times daily.    TRIAMCINOLONE ACETONIDE 0.025% (KENALOG) 0.025 % CREAM    AAA on face BID x 1-2 wks then prn flares only    VITAMIN D (VITAMIN D3) 1000 UNITS TAB    Take 1,000 Units by mouth once daily.    VITAMIN E 1000 UNIT CAPSULE    Take 1,000 Units by mouth once daily.       Assessment/Plans:-  59 y.o.F with history of L sided infiltrating ductal carcinoma of the L breast (dx on Jan 2017), HTN, depression, gastritis, and recently diagnosed myositis (uncertain if it's autoimmune vs medication induced), present today for follow up because of concern about myositis flare.     Patient started on Atelizumab/Abraxane on 11/7/18.  Patient developed swelling of the face on 1/4/19 and went to urgent care and given short course of prednisone 20 mg BID. On 1/15/19, patient seen in hem/onc clinic with c/o of pressure and tightness around neck. CT scan of chest and neck did not reveal any vascular compression. Started on prednisone 60 mg  with taper. On 1/22/18 patient with c/o proximal muscle weakness. CPK found to be elevated around 4k. Patient admitted and given solumedrol 80 mg IV on 1/22/18. Last infusion of Atelizumab on 12/19/18. Last infusion of Abraxane on 1/3/19. Given Solumedrol 1g x 1 on 1/23/18. Seen by Dermatology on 1/24/18 and biopsy done of skin rash. Given distribution of rashes, there was concern for dermatomyositis. Patient started on Solumedrol 125 mg IV BID at that time.  Skin biopsy resulted consistent with dermatomyositis.     Labs: CPK and Aldolase normalized. +DARCY 1:640 speckled with negative profile.  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 quantTB: indeterminate, T-spot: negative     Spirometry: normal, normal diffusion capacity. FVC: 81%, DLCO: 114%     Patient exhibits signs of dermatomyositis (heliotropic rash and gottron's papules).   Dermatomyositis can be associated with malignancy.  MRI of LUE showed edema of the deltoid and biceps.  Exam with proximal muscle weakness: b/l deltoids 4/5, b/l iliopsoas 4.4/5. 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   Given +TIF1 gamma positivity, most likely dermatomyositis is from underlying malignancy.      Failed 2nd swallow eval on 2/6/19. PEG placed 2/7/2019.     Current medications:  - prednisone 20mg   - IVIG Started Jan 2019 - last dose April 7  - Rituxan 1000mg x 2 (last dose April 27)  - HCQ 200mg BID      Esophageal biopsy - negative for viral infection.  Nonspecific chronic inflammation.     EGD/Colonoscopy (July 2019) - normal. PEG tube removed       Fiboscan done Aug 2019 - showed fatty liver with no scarring/damage.,      Failed Cellcept - worsening leukopenia, elevated LFTs, and other side effects without improvement of symptoms      Recent studies demonstrated increased efficacy in IVIG when spaced out (Q2w instead of Q4w)     Patient is an intermediate metabolizer based on TPMT.  Cannot start imuran.  Labs still neutropenic and thrombocytopenic at this time - uncertain of the cause (radiation induce BM fibrosis vs medication induce?)      Vaccination completed - Prevnar and Shingles (completed Aug 2019) and flu vaccination for this season (Aug 2019)      Dermatomyositis - currently on Rituxan and Prednisone 20mg daily.  Tolerating well and improvement of symptoms.  Plan is taper steroid and continue Rituxan 1000mg x 2 every 6 months.     It appears that patient continued to fail steroid tapering on multiple occasion.  Patient is uptodate on all CA screening - next PET scan is July 2020.  Other consideration for continued myalgia includes neurological condition such as MG.  Will other MG panel and referral to neurology.     If patient continues to failed steroid tapering - consideration for tacrolimus + IVIG, Xeljanz, plasmapharesis/plasma exchange.       My addendum last time    59 year old female with dermatomyositis s/p PD1 inhibitor used for breast cancer  TIF1 gamma +    Low dose steroids didn't help    Rash+ muscle weakness+dysphagia     IVIG and steroids initial treatment    mtx causes LFT derangement,anemia and leukopenia   She didn't respond to IVIG, initially we gave IVIG 2 g/kg every 4 weeks and then 1 g/kg bw ever 2 weeks : poor response   She was on cellcept 500 mg daily and she had cytopenias   Imuran not an option since she is intermediate metaboliser     She had significant periorbital edema,rash on the neck and thighs    We didn't think she was responding to the treatment     So we gave her rituximab 1000 mg x 2    She did well and then  she flared again may 2020    We had to go up on the dose to 30 mg and then tapered it to 20 mg  We started plaquenil    CT chest abdomen and pelvis nml  Colonoscopy nml  PET lymphadenopathy  Echo nml    Myasthenia gravis panel negative     Last time she needed prednisone 10 mg     She was weak and her skin had flared    Refractory dermatomyositis      We resumed IVIG and xeljanz 5 mg bid     She has done really well    9/2020    She started xeljanz 5 mg bid mid august  She feels well  Swelling is better  Her weakness and fatigue is all gone  Face still looks red   Chest is all better and upper arm is great  Itch and rash seems better   Rash on the legs is gone     CK,aldolase nml  CRP nml   now   AST 61  GFR nml  ALT nml    plts 145 but stable  H/h 11.6/35.3    But the white count has dropped to 2.70    It has been low on and off since 2019 so this is not new     Her lymphocyte count is 900 cells/mm3    Lymphopenia    In the controlled clinical trials, confirmed decreases in absolute lymphocyte counts below 500 cells/mm3 occurred in 0.04% of patients for the 5 mg twice daily and 10 mg twice daily XELJANZ groups combined during the first 3 months of exposure.    Confirmed lymphocyte counts less than 500 cells/mm3 were associated with an increased incidence of treated and serious infections      Her neutrophil count is 1300 cells/mm3    Neutropenia    In the controlled clinical trials, confirmed decreases in ANC below 1000 cells/mm3 occurred in 0.07% of patients for the 5 mg twice daily and 10 mg twice daily XELJANZ groups combined during the first 3 months of exposure.    There were no confirmed decreases in ANC below 500 cells/mm3 observed in any treatment group.    There was no clear relationship between neutropenia and the occurrence of serious infections.    In the long-term safety population, the pattern and incidence of confirmed decreases in ANC remained consistent with what was seen in the controlled  clinical trials        Lymphocyte Abnormalities    Treatment with XELJANZ was associated with initial lymphocytosis at one month of exposure followed by a gradual decrease in mean absolute lymphocyte counts below the baseline of approximately 10% during 12 months of therapy. Lymphocyte counts less than 500 cells/mm3 were associated with an increased incidence of treated and serious infections.    Avoid initiation of XELJANZ/XELJANZ XR treatment in patients with a low lymphocyte count (i.e., less than 500 cells/mm3). In patients who develop a confirmed absolute lymphocyte count less than 500 cells/mm3, treatment with XELJANZ/XELJANZ XR is not recommended.    Monitor lymphocyte counts at baseline and every 3 months thereafter.       Neutropenia    Treatment with XELJANZ was associated with an increased incidence of neutropenia (less than 2000 cells/mm3) compared to placebo.    Avoid initiation of XELJANZ/XELJANZ XR treatment in patients with a low neutrophil count (i.e., ANC less than 1000 cells/mm3). For patients who develop a persistent ANC of 500 to 1000 cells/mm3, interrupt XELJANZ/XELJANZ XR dosing until ANC is greater than or equal to 1000 cells/mm3. In patients who develop an ANC less than 500 cells/mm3, treatment with XELJANZ/XELJANZ XR is not recommended.    Monitor neutrophil counts at baseline and after 4-8 weeks of treatment and every 3 months thereafter.     Anemia    Avoid initiation of XELJANZ/XELJANZ XR treatment in patients with a low hemoglobin level (i.e., less than 9 g/dL). Treatment with XELJANZ/XELJANZ XR should be interrupted in patients who develop hemoglobin levels less than 8 g/dL or whose hemoglobin level drops greater than 2 g/dL on treatment.    So at this point we decided to continue the xeljanz 5 mg bid and IVIG     But we sent her to hematology and the counts further dropped     10/12/2020  We cut the xeljanz dose to 5 mg daily  No more swelling  Face looks clear,not much redness  The  eyelid is not puffy and not intensely erythematous   Along the nasal folds there is some redness but again not intense  On the MCPs and Dorsal hands she has some erythema which is more pinkish  Not itchy  Sometimes these pink areas can turn pigmented  Now complaint to sunscreen      White count has improved from 2.72 to 2.80  H/h 11.3/34.7  plts nml  ANC has improved from 0.6 to 1.4  Lymphocyte count 0.9    Ck,aldolase nml  CRP nml  ESR 65      Plan     -never had blood clot  -never had diverticulitis    CONTINUE  IVIG  tofacitinib 5 mg daily    We completely tapered off the steroids     Sun screen overtly emphasised    - continue muscle strengthening exercise daily  - 1200 mg dietary calcium and Vitamin D3 1000 mg daily    Follow up in 3 months     Labs in 6 weeks,message on the portal  Labs in 12 weeks  See  in January and see what he says     If CBC shows further worsening then I might have to d.c xeljanz  But if stable then I will check labs frequently and we will keep talking if any infections and then re visit xeljanz d/c vs continuation    Suggested to see dermatology periodically and take their opinion on the clearance of skin disease and if any thing else needs to be done        Answers for HPI/ROS submitted by the patient on 11/20/2020   mouth sores: No  trouble swallowing: No  unexpected weight change: No  genital sore: No

## 2020-11-30 NOTE — PROGRESS NOTES
Breast Surgery  [facility]  Department of Surgery        REFERRING PROVIDER: Alfredo English  MEDICAL ONCOLOGIST: Dr. Alex Reyna  RADIATION ONCOLOGIST:  Dr. Jose Lopez    Chief Complaint: recurrent breast cancer, oncological follow up        Subjective:      Patient ID: Ermelinda Verde is a 56 y.o. female who recently was diagnosed with metastatic breast cancer to supraclavicular and cervical chain nodes and has now completed chemotherapy with nab-paclitaxel and Atezolizumab with Dr. Reyna. She has also completed radiation with Dr. Lopez.  SHe had a difficult time with treatment developing dermatomyositis requiring prolonged hospitalization and PEG tube, which has since been removed.  She otherwise has no complaints but continues to struggle with treatment for the DM. No CP, SOB, Back or bony pain, HA or vision changes.  She is s/p left skin sparing total mastectomy with SCOTT flap reconstruction (Dr. Sewell) on 6/26/17.   Due to the fact that she had residual malignancy at time of surgery, she received additional chemotherapy with single agent Adriamycin for 4 cycles given in a dose dense fashion.     Oncologic History:  She developed a palpable abnormality in her left breast in January 2017 which she noted on self-examination. A diagnostic mammogram on January 19 showed a greater than 1 cm nodule in the upper outer portion of left breast.  By ultrasound this was lobulated and hypoechoic measuring 1.75 x 1.51 x 1.96 cm. On January 24, 2017 a core needle biopsy was performed which showed infiltrating ductal carcinoma, high grade.  The tumor was ER negative, CA negative, and HER-2 negative. A follow-up ultrasound on February 6 showed 2.5 x 2.2 x 1.5 cm left breast mass.  There was no abnormality noted in the left axilla. She underwent sentinel lymph node biopsy on February 22.  That showed 4 negative lymph nodes.     She had 4 cycles of  Wilbur-adjuvantTaxotere and Cytoxan completed  on 5/9/17.     On June   she underwent left mastectomy.  That revealed 2 foci of invasive high-grade carcinoma measuring 14 mm and 1.5 mm.  Margins were negative.     She completed 4 cycles of adjuvant Adriamycin on 2017.     She then developed a palpable supraclavicular LN in Oct 2018 which was biopsied and showed triple negative metastatic adenocarcinoma. PET scan (10/11/18) showed multiple enlarged left-sided hypermetabolic nodes are present involving the left lower cervical/supraclavicular chain, infraclavicular region, and retropectoral region.  Index left supraclavicular node (for axis measuring 2.0 cm) has SUV max 27.2 and index left retropectoral node (ill-defined with short axis measuring approximately 2.1 cm) has SUV max 27.2    BRCA 1/2 negative.    Interval History:   59 yo female presents for oncological follow up. She is s/p left skin sparing total mastectomy with SCOTT flap reconstruction on 17. She is doing well. Has not noticed any new masses in contralateral breast, no nipple discharge or overlying skin changes.  No back or bony pain, no cough or shortness of breath, no nausea/ vomiting, no vision change.  She does continue to have fullness in the left supraclavicular region however no distinct palpable lymphadenopathy.  SHe is home on disability and has not been able to work due to the dematomyositis.          Past Medical History:   Diagnosis Date    Cancer       breast    Hypertension              Past Surgical History:   Procedure Laterality Date    BREAST SURGERY   2017     left breast bx     SECTION        D&C        PORTACATH PLACEMENT        SENTINEL LYMPH NODE BIOPSY   2017     left    UTERINE FIBROID SURGERY                 Current Outpatient Prescriptions on File Prior to Visit   Medication Sig Dispense Refill    amlodipine-benazepril (LOTREL) 10-40 mg per capsule TK 1 C PO QD   3    CALCIUM CARBONATE (CALCIUM 500 ORAL) Take by mouth.        cyanocobalamin (VITAMIN  "B-12) 500 MCG tablet Take 500 mcg by mouth once daily.        DOCUSATE CALCIUM (STOOL SOFTENER ORAL) Take by mouth.        hydrochlorothiazide (HYDRODIURIL) 25 MG tablet TK 1 T PO ONCE A DAY   3    metoprolol succinate (TOPROL-XL) 50 MG 24 hr tablet TK 1/2 T PO QD   3    potassium chloride SA (K-DUR,KLOR-CON) 20 MEQ tablet TK 1 T PO  QD   3    ferrous gluconate (FERGON) 324 MG tablet TK 1 T PO QD   3      No current facility-administered medications on file prior to visit.       Social History   Social History            Social History    Marital status:        Spouse name: N/A    Number of children: N/A    Years of education: N/A          Occupational History    Not on file.             Social History Main Topics    Smoking status: Never Smoker    Smokeless tobacco: Not on file    Alcohol use Yes         Comment: wine    Drug use: No    Sexual activity: Not on file           Other Topics Concern    Not on file          Social History Narrative    No narrative on file                Family History   Problem Relation Age of Onset    Heart disease Father      Breast cancer Sister         at age 52 or 53        Objective:     BP (!) 155/86 (BP Location: Right arm, Patient Position: Sitting, BP Method: Medium (Automatic))   Pulse 95   Ht 5' 5" (1.651 m)   Wt 78 kg (171 lb 15.3 oz)   LMP  (LMP Unknown)   BMI 28.62 kg/m²       Physical Exam   Multiple areas of skin with patchy scaly surface, face, hands, etc.  Constitutional: She is oriented to person, place, and time. She appears well-developed and well-nourished.   Neck: Normal range of motion. Neck supple. There is a tiny remnant of sub-cm node at the base of the neck in the supraclavicular space, could be the coil marker just below skin surface.  I do not feel additional LAD, no axillary LAD.  Cardiovascular: Normal rate, regular rhythm and normal heart sounds.    Pulmonary/Chest: Effort normal and breath sounds normal.   Well healed " incisional scars of the left and right breast around the nipple and inferior to the nipple. No palapble mass or axillary adenopathy.  There continues to be fullness in her left supraclavicular region but no distinct palpable nodes are present both in the cervical or supraclavicular chains .  THere are no breast masses, no skin nodules and no axillary LAD.  Abdominal: Soft. Bowel sounds are normal.   Musculoskeletal: Normal range of motion.   Neurological: She is alert and oriented to person, place, and time.      Supraclavicular LN Bx 10/19/18:  Positive for metastatic adenocarcinoma, ER/CT -, HER2 -    Imaging:  PET 10/11/18:  Multiple enlarged left-sided hypermetabolic nodes are present involving the left lower cervical/supraclavicular chain, infraclavicular region, and retropectoral region.  Index left supraclavicular node (for axis measuring 2.0 cm) has SUV max 27.2 and index left retropectoral node (ill-defined with short axis measuring approximately 2.1 cm) has SUV max 27.2.    Right Mammogram 11/13/18:  Computer-aided detection was utilized in the interpretation of this examination. This procedure was performed using tomosynthesis.       The breast is heterogeneously dense, which may obscure small masses. There is no evidence of suspicious masses, microcalcifications or architectural distortion.  Status post right breast reduction. Status post contralateral mastectomy.  Only the remaining breast has been imaged.      Impression:  No mammographic evidence of malignancy.     BI-RADS Category 1: Negative     Recommendation:  Routine screening mammogram in 1 year is recommended.     Assessment:       Ms. Wadsworth is a 58 yo F s/p left total mastectomy skin sparing with DEIP flap reconstruction on 6/26/17 for [id number], Stage IA triple negative IDC presenting with biopsy proven metastatic disease as a recurrence in her left supraclavicular LN, and more PET +  Nodes along left cervical and retropectoral LN chain, now  completed chemotherapy and XRT.    Plan:      - most recent CT CAP without evidence of disease   - Follow up with [facility] in 1 year   - Contralateral mammogram due today, BIRADS 2, next due in one year  - Continue follow up with medical oncology  - Call with any questions or concerns

## 2020-12-01 ENCOUNTER — HOSPITAL ENCOUNTER (OUTPATIENT)
Dept: RADIOLOGY | Facility: HOSPITAL | Age: 60
Discharge: HOME | End: 2020-12-01
Attending: SURGERY
Payer: COMMERCIAL

## 2020-12-01 ENCOUNTER — OFFICE VISIT (OUTPATIENT)
Dept: SURGERY | Facility: CLINIC | Age: 60
End: 2020-12-01
Payer: COMMERCIAL

## 2020-12-01 VITALS
HEART RATE: 95 BPM | BODY MASS INDEX: 28.65 KG/M2 | DIASTOLIC BLOOD PRESSURE: 86 MMHG | HEIGHT: 65 IN | WEIGHT: 171.94 LBS | SYSTOLIC BLOOD PRESSURE: 155 MMHG

## 2020-12-01 DIAGNOSIS — Z12.31 BREAST CANCER SCREENING BY MAMMOGRAM: ICD-10-CM

## 2020-12-01 DIAGNOSIS — C50.412 MALIGNANT NEOPLASM OF UPPER-OUTER QUADRANT OF LEFT BREAST IN FEMALE, ESTROGEN RECEPTOR NEGATIVE: ICD-10-CM

## 2020-12-01 DIAGNOSIS — Z17.1 MALIGNANT NEOPLASM OF UPPER-OUTER QUADRANT OF LEFT BREAST IN FEMALE, ESTROGEN RECEPTOR NEGATIVE: Chronic | ICD-10-CM

## 2020-12-01 DIAGNOSIS — C50.412 MALIGNANT NEOPLASM OF UPPER-OUTER QUADRANT OF LEFT BREAST IN FEMALE, ESTROGEN RECEPTOR NEGATIVE: Chronic | ICD-10-CM

## 2020-12-01 DIAGNOSIS — Z17.1 MALIGNANT NEOPLASM OF UPPER-OUTER QUADRANT OF LEFT BREAST IN FEMALE, ESTROGEN RECEPTOR NEGATIVE: ICD-10-CM

## 2020-12-01 DIAGNOSIS — Z12.31 ENCOUNTER FOR SCREENING MAMMOGRAM FOR BREAST CANCER: Primary | ICD-10-CM

## 2020-12-01 PROCEDURE — 3079F PR MOST RECENT DIASTOLIC BLOOD PRESSURE 80-89 MM HG: ICD-10-PCS | Mod: CPTII,S$GLB,, | Performed by: SURGERY

## 2020-12-01 PROCEDURE — 3077F SYST BP >= 140 MM HG: CPT | Mod: CPTII,S$GLB,, | Performed by: SURGERY

## 2020-12-01 PROCEDURE — 99999 PR PBB SHADOW E&M-EST. PATIENT-LVL III: ICD-10-PCS | Mod: PBBFAC,,, | Performed by: SURGERY

## 2020-12-01 PROCEDURE — 99213 OFFICE O/P EST LOW 20 MIN: CPT | Mod: S$GLB,,, | Performed by: SURGERY

## 2020-12-01 PROCEDURE — 77067 SCR MAMMO BI INCL CAD: CPT | Mod: 26,,, | Performed by: RADIOLOGY

## 2020-12-01 PROCEDURE — 77063 BREAST TOMOSYNTHESIS BI: CPT | Mod: 26,,, | Performed by: RADIOLOGY

## 2020-12-01 PROCEDURE — 77067 MAMMO DIGITAL SCREENING RIGHT WITH TOMOSYNTHESIS_CAD: ICD-10-PCS | Mod: 26,,, | Performed by: RADIOLOGY

## 2020-12-01 PROCEDURE — 99999 PR PBB SHADOW E&M-EST. PATIENT-LVL III: CPT | Mod: PBBFAC,,, | Performed by: SURGERY

## 2020-12-01 PROCEDURE — 99213 PR OFFICE/OUTPT VISIT, EST, LEVL III, 20-29 MIN: ICD-10-PCS | Mod: S$GLB,,, | Performed by: SURGERY

## 2020-12-01 PROCEDURE — 77063 MAMMO DIGITAL SCREENING RIGHT WITH TOMOSYNTHESIS_CAD: ICD-10-PCS | Mod: 26,,, | Performed by: RADIOLOGY

## 2020-12-01 PROCEDURE — 77067 SCR MAMMO BI INCL CAD: CPT | Mod: TC

## 2020-12-01 PROCEDURE — 3008F PR BODY MASS INDEX (BMI) DOCUMENTED: ICD-10-PCS | Mod: CPTII,S$GLB,, | Performed by: SURGERY

## 2020-12-01 PROCEDURE — 1126F AMNT PAIN NOTED NONE PRSNT: CPT | Mod: S$GLB,,, | Performed by: SURGERY

## 2020-12-01 PROCEDURE — 1126F PR PAIN SEVERITY QUANTIFIED, NO PAIN PRESENT: ICD-10-PCS | Mod: S$GLB,,, | Performed by: SURGERY

## 2020-12-01 PROCEDURE — 3077F PR MOST RECENT SYSTOLIC BLOOD PRESSURE >= 140 MM HG: ICD-10-PCS | Mod: CPTII,S$GLB,, | Performed by: SURGERY

## 2020-12-01 PROCEDURE — 3079F DIAST BP 80-89 MM HG: CPT | Mod: CPTII,S$GLB,, | Performed by: SURGERY

## 2020-12-01 PROCEDURE — 3008F BODY MASS INDEX DOCD: CPT | Mod: CPTII,S$GLB,, | Performed by: SURGERY

## 2020-12-02 ENCOUNTER — INFUSION (OUTPATIENT)
Dept: INFECTIOUS DISEASES | Facility: HOSPITAL | Age: 60
End: 2020-12-02
Attending: INTERNAL MEDICINE
Payer: COMMERCIAL

## 2020-12-02 VITALS
BODY MASS INDEX: 28.62 KG/M2 | RESPIRATION RATE: 16 BRPM | DIASTOLIC BLOOD PRESSURE: 79 MMHG | TEMPERATURE: 98 F | OXYGEN SATURATION: 100 % | HEART RATE: 100 BPM | WEIGHT: 171.94 LBS | SYSTOLIC BLOOD PRESSURE: 145 MMHG

## 2020-12-02 DIAGNOSIS — R13.19 ESOPHAGEAL DYSPHAGIA: ICD-10-CM

## 2020-12-02 DIAGNOSIS — M33.13 DERMATOMYOSITIS: Primary | ICD-10-CM

## 2020-12-02 PROCEDURE — 96365 THER/PROPH/DIAG IV INF INIT: CPT

## 2020-12-02 PROCEDURE — 96375 TX/PRO/DX INJ NEW DRUG ADDON: CPT

## 2020-12-02 PROCEDURE — 96367 TX/PROPH/DG ADDL SEQ IV INF: CPT

## 2020-12-02 PROCEDURE — 25000003 PHARM REV CODE 250: Performed by: INTERNAL MEDICINE

## 2020-12-02 PROCEDURE — 63600175 PHARM REV CODE 636 W HCPCS: Mod: JG | Performed by: INTERNAL MEDICINE

## 2020-12-02 PROCEDURE — 96366 THER/PROPH/DIAG IV INF ADDON: CPT

## 2020-12-02 RX ORDER — SODIUM CHLORIDE 0.9 % (FLUSH) 0.9 %
10 SYRINGE (ML) INJECTION
Status: CANCELLED | OUTPATIENT
Start: 2020-12-23

## 2020-12-02 RX ORDER — FAMOTIDINE 10 MG/ML
20 INJECTION INTRAVENOUS
Status: COMPLETED | OUTPATIENT
Start: 2020-12-02 | End: 2020-12-02

## 2020-12-02 RX ORDER — ACETAMINOPHEN 325 MG/1
650 TABLET ORAL
Status: COMPLETED | OUTPATIENT
Start: 2020-12-02 | End: 2020-12-02

## 2020-12-02 RX ORDER — FAMOTIDINE 10 MG/ML
20 INJECTION INTRAVENOUS
Status: CANCELLED | OUTPATIENT
Start: 2020-12-23

## 2020-12-02 RX ORDER — ACETAMINOPHEN 325 MG/1
650 TABLET ORAL
Status: CANCELLED | OUTPATIENT
Start: 2020-12-23

## 2020-12-02 RX ORDER — HEPARIN 100 UNIT/ML
500 SYRINGE INTRAVENOUS
Status: CANCELLED | OUTPATIENT
Start: 2020-12-23

## 2020-12-02 RX ORDER — SODIUM CHLORIDE 0.9 % (FLUSH) 0.9 %
10 SYRINGE (ML) INJECTION
Status: DISCONTINUED | OUTPATIENT
Start: 2020-12-02 | End: 2020-12-02 | Stop reason: HOSPADM

## 2020-12-02 RX ADMIN — HUMAN IMMUNOGLOBULIN G 80 G: 20 LIQUID INTRAVENOUS at 09:12

## 2020-12-02 RX ADMIN — SODIUM CHLORIDE: 0.9 INJECTION, SOLUTION INTRAVENOUS at 08:12

## 2020-12-02 RX ADMIN — ACETAMINOPHEN 650 MG: 325 TABLET ORAL at 08:12

## 2020-12-02 RX ADMIN — DIPHENHYDRAMINE HYDROCHLORIDE 50 MG: 50 INJECTION INTRAMUSCULAR; INTRAVENOUS at 08:12

## 2020-12-02 RX ADMIN — FAMOTIDINE 20 MG: 10 INJECTION INTRAVENOUS at 08:12

## 2020-12-02 NOTE — PROGRESS NOTES
Pt arrived to Infusion suite with Lupus flair, face and upper torso red, with swelling to both eyelids noted, denies SOB.    Pt arrived to Infusion suite for 1/2 Privigen 80 gms, received tylenol 650 mg po, pepcid 20 mg ivp, and benadryl 50mg/50cc NS ivpb 30 mins prior to Privigen infusion. Started infusion @ 48cc/hr and max rate of 240 cc/hr, tolerated well.

## 2020-12-03 ENCOUNTER — INFUSION (OUTPATIENT)
Dept: INFECTIOUS DISEASES | Facility: HOSPITAL | Age: 60
End: 2020-12-03
Attending: INTERNAL MEDICINE
Payer: COMMERCIAL

## 2020-12-03 VITALS
HEIGHT: 65 IN | HEART RATE: 103 BPM | DIASTOLIC BLOOD PRESSURE: 69 MMHG | TEMPERATURE: 98 F | BODY MASS INDEX: 28.5 KG/M2 | WEIGHT: 171.06 LBS | SYSTOLIC BLOOD PRESSURE: 137 MMHG | OXYGEN SATURATION: 99 % | RESPIRATION RATE: 18 BRPM

## 2020-12-03 DIAGNOSIS — M33.13 DERMATOMYOSITIS: Primary | ICD-10-CM

## 2020-12-03 DIAGNOSIS — R13.19 ESOPHAGEAL DYSPHAGIA: ICD-10-CM

## 2020-12-03 PROCEDURE — 96365 THER/PROPH/DIAG IV INF INIT: CPT

## 2020-12-03 PROCEDURE — 96366 THER/PROPH/DIAG IV INF ADDON: CPT

## 2020-12-03 PROCEDURE — 96375 TX/PRO/DX INJ NEW DRUG ADDON: CPT

## 2020-12-03 PROCEDURE — 96367 TX/PROPH/DG ADDL SEQ IV INF: CPT

## 2020-12-03 PROCEDURE — 63600175 PHARM REV CODE 636 W HCPCS: Performed by: INTERNAL MEDICINE

## 2020-12-03 PROCEDURE — 25000003 PHARM REV CODE 250: Performed by: INTERNAL MEDICINE

## 2020-12-03 RX ORDER — FAMOTIDINE 10 MG/ML
20 INJECTION INTRAVENOUS
Status: COMPLETED | OUTPATIENT
Start: 2020-12-03 | End: 2020-12-03

## 2020-12-03 RX ORDER — ACETAMINOPHEN 325 MG/1
650 TABLET ORAL
Status: CANCELLED | OUTPATIENT
Start: 2020-12-23

## 2020-12-03 RX ORDER — FAMOTIDINE 10 MG/ML
20 INJECTION INTRAVENOUS
Status: CANCELLED | OUTPATIENT
Start: 2020-12-23

## 2020-12-03 RX ORDER — SODIUM CHLORIDE 0.9 % (FLUSH) 0.9 %
10 SYRINGE (ML) INJECTION
Status: DISCONTINUED | OUTPATIENT
Start: 2020-12-03 | End: 2020-12-03 | Stop reason: HOSPADM

## 2020-12-03 RX ORDER — SODIUM CHLORIDE 0.9 % (FLUSH) 0.9 %
10 SYRINGE (ML) INJECTION
Status: CANCELLED | OUTPATIENT
Start: 2020-12-23

## 2020-12-03 RX ORDER — HEPARIN 100 UNIT/ML
500 SYRINGE INTRAVENOUS
Status: DISCONTINUED | OUTPATIENT
Start: 2020-12-03 | End: 2020-12-03 | Stop reason: HOSPADM

## 2020-12-03 RX ORDER — ACETAMINOPHEN 325 MG/1
650 TABLET ORAL
Status: COMPLETED | OUTPATIENT
Start: 2020-12-03 | End: 2020-12-03

## 2020-12-03 RX ORDER — HEPARIN 100 UNIT/ML
500 SYRINGE INTRAVENOUS
Status: CANCELLED | OUTPATIENT
Start: 2020-12-23

## 2020-12-03 RX ADMIN — SODIUM CHLORIDE: 9 INJECTION, SOLUTION INTRAVENOUS at 09:12

## 2020-12-03 RX ADMIN — HUMAN IMMUNOGLOBULIN G 80 G: 40 LIQUID INTRAVENOUS at 09:12

## 2020-12-03 RX ADMIN — FAMOTIDINE 20 MG: 10 INJECTION INTRAVENOUS at 09:12

## 2020-12-03 RX ADMIN — ACETAMINOPHEN 650 MG: 325 TABLET ORAL at 09:12

## 2020-12-03 RX ADMIN — DIPHENHYDRAMINE HYDROCHLORIDE 50 MG: 50 INJECTION INTRAMUSCULAR; INTRAVENOUS at 09:12

## 2020-12-03 NOTE — PROGRESS NOTES
Pt arrived to Infusion suite with Lupus flair, face and upper torso red, with swelling to both eyelids noted, denies SOB.    Pt arrived to Infusion suite for 2/2 Privigen 80 gms, received tylenol 650 mg po, pepcid 20 mg ivp, and benadryl 50mg/50cc NS ivpb 30 mins prior to Privigen infusion. Started infusion @ 48cc/hr and max rate of 240 cc/hr, tolerated well.

## 2020-12-15 ENCOUNTER — OFFICE VISIT (OUTPATIENT)
Dept: OBSTETRICS AND GYNECOLOGY | Facility: CLINIC | Age: 60
End: 2020-12-15
Payer: COMMERCIAL

## 2020-12-15 VITALS
SYSTOLIC BLOOD PRESSURE: 122 MMHG | OXYGEN SATURATION: 100 % | HEIGHT: 65 IN | WEIGHT: 174 LBS | DIASTOLIC BLOOD PRESSURE: 70 MMHG | HEART RATE: 89 BPM | RESPIRATION RATE: 12 BRPM | BODY MASS INDEX: 28.99 KG/M2

## 2020-12-15 DIAGNOSIS — Z01.419 ENCNTR FOR GYN EXAM (GENERAL) (ROUTINE) W/O ABN FINDINGS: Primary | ICD-10-CM

## 2020-12-15 DIAGNOSIS — Z12.4 CERVICAL CANCER SCREENING: ICD-10-CM

## 2020-12-15 PROCEDURE — 99396 PR PREVENTIVE VISIT,EST,40-64: ICD-10-PCS | Mod: S$GLB,,, | Performed by: STUDENT IN AN ORGANIZED HEALTH CARE EDUCATION/TRAINING PROGRAM

## 2020-12-15 PROCEDURE — 87624 HPV HI-RISK TYP POOLED RSLT: CPT

## 2020-12-15 PROCEDURE — 3078F PR MOST RECENT DIASTOLIC BLOOD PRESSURE < 80 MM HG: ICD-10-PCS | Mod: CPTII,S$GLB,, | Performed by: STUDENT IN AN ORGANIZED HEALTH CARE EDUCATION/TRAINING PROGRAM

## 2020-12-15 PROCEDURE — 99999 PR PBB SHADOW E&M-EST. PATIENT-LVL IV: ICD-10-PCS | Mod: PBBFAC,,, | Performed by: STUDENT IN AN ORGANIZED HEALTH CARE EDUCATION/TRAINING PROGRAM

## 2020-12-15 PROCEDURE — 3008F BODY MASS INDEX DOCD: CPT | Mod: CPTII,S$GLB,, | Performed by: STUDENT IN AN ORGANIZED HEALTH CARE EDUCATION/TRAINING PROGRAM

## 2020-12-15 PROCEDURE — 1126F AMNT PAIN NOTED NONE PRSNT: CPT | Mod: S$GLB,,, | Performed by: STUDENT IN AN ORGANIZED HEALTH CARE EDUCATION/TRAINING PROGRAM

## 2020-12-15 PROCEDURE — 1126F PR PAIN SEVERITY QUANTIFIED, NO PAIN PRESENT: ICD-10-PCS | Mod: S$GLB,,, | Performed by: STUDENT IN AN ORGANIZED HEALTH CARE EDUCATION/TRAINING PROGRAM

## 2020-12-15 PROCEDURE — 99999 PR PBB SHADOW E&M-EST. PATIENT-LVL IV: CPT | Mod: PBBFAC,,, | Performed by: STUDENT IN AN ORGANIZED HEALTH CARE EDUCATION/TRAINING PROGRAM

## 2020-12-15 PROCEDURE — 3074F PR MOST RECENT SYSTOLIC BLOOD PRESSURE < 130 MM HG: ICD-10-PCS | Mod: CPTII,S$GLB,, | Performed by: STUDENT IN AN ORGANIZED HEALTH CARE EDUCATION/TRAINING PROGRAM

## 2020-12-15 PROCEDURE — 88175 CYTOPATH C/V AUTO FLUID REDO: CPT

## 2020-12-15 PROCEDURE — 3078F DIAST BP <80 MM HG: CPT | Mod: CPTII,S$GLB,, | Performed by: STUDENT IN AN ORGANIZED HEALTH CARE EDUCATION/TRAINING PROGRAM

## 2020-12-15 PROCEDURE — 99396 PREV VISIT EST AGE 40-64: CPT | Mod: S$GLB,,, | Performed by: STUDENT IN AN ORGANIZED HEALTH CARE EDUCATION/TRAINING PROGRAM

## 2020-12-15 PROCEDURE — 3008F PR BODY MASS INDEX (BMI) DOCUMENTED: ICD-10-PCS | Mod: CPTII,S$GLB,, | Performed by: STUDENT IN AN ORGANIZED HEALTH CARE EDUCATION/TRAINING PROGRAM

## 2020-12-15 PROCEDURE — 3074F SYST BP LT 130 MM HG: CPT | Mod: CPTII,S$GLB,, | Performed by: STUDENT IN AN ORGANIZED HEALTH CARE EDUCATION/TRAINING PROGRAM

## 2020-12-15 NOTE — PROGRESS NOTES
Subjective:    Patient ID: Ermelinda Verde is a 60 y.o. y.o. female.     Chief Complaint: Annual Well Woman Exam     History of Present Illness:  Ermelinda presents today for Annual Well Woman exam. She describes her menses as absent.She denies pelvic pain.  She denies breast tenderness, masses, nipple discharge. She denies difficulty with urination or bowel movements. She admits to menopausal symptoms such as hotflashes, vaginal dryness, and night sweats. She denies bloating, early satiety, or weight changes. She is sexually active.      Menstrual History:   No LMP recorded (lmp unknown). Patient is postmenopausal..     OB History        6    Para   5    Term   5            AB   1    Living           SAB   1    TAB        Ectopic        Multiple        Live Births                     The following portions of the patient's history were reviewed and updated as appropriate: allergies, current medications, past family history, past medical history, past social history, past surgical history and problem list.    ROS:   CONSTITUTIONAL: Negative for fever, chills, diaphoresis, weakness, fatigue, weight loss, weight gain  ENT: negative for sore throat, nasal congestion, nasal discharge, epistaxis, hearing loss. Positive for tinnitus  EYES: negative for blurry vision, decreased vision, loss of vision, eye pain, diplopia, photophobia, discharge  SKIN: Negative for rash, itching, hives  RESPIRATORY: negative for cough, hemoptysis, shortness of breath, pleuritic chest pain, wheezing  CARDIOVASCULAR: negative for chest pain, dyspnea on exertion, orthopnea, paroxysmal nocturnal dyspnea, edema, palpitations  BREAST: negative for breast  tenderness, breast mass, nipple discharge, or skin changes  GASTROINTESTINAL: negative for abdominal pain, flank pain, nausea, vomiting, diarrhea, constipation, black stool, blood in stool  GENITOURINARY: negative for abnormal vaginal bleeding, amenorrhea, decreased libido,  dysuria, genital sores, hematuria, incontinence, menorrhagia, pelvic pain, urinary frequency, vaginal discharge  HEMATOLOGIC/LYMPHATIC: negative for swollen lymph nodes, bleeding, bruising  MUSCULOSKELETAL: negative for back pain, joint pain, joint stiffness, joint swelling, muscle pain, muscle weakness  NEUROLOGICAL: negative for dizzy/vertigo, headache, focal weakness, numbness/tingling, speech problems, loss of consciousness, confusion, memory loss  BEHAVORIAL/PSYCH: negative for anxiety, depression, psychosis  ENDOCRINE: negative for polydipsia/polyuria, palpitations, skin changes, temperature intolerance, unexpected weight changes  ALLERGIC/IMMUNOLOGIC: negative for urticaria, hay fever, angioedema      Objective:    Vital Signs:  Vitals:    12/15/20 1120   BP: 122/70   Pulse: 89   Resp: 12       Physical Exam:  General:  alert, cooperative, no distress   Skin:  Skin color, texture, turgor normal. No rashes or lesions   HEENT:  conjunctivae/corneas clear. PERRL.   Neck: supple, trachea midline, no adenopathy or thyromegally   Respiratory:  Normal effort   Breasts:  status post mastectomy on the left, mastectomy site well healed without palpable abnormalities, reconstructive implant at mastectomy site without palpable abnormalities, no discharge, erythema, tenderness, or palpable masses; no axillary lymphadenopathy   Abdomen:  soft, nontender, no palpable masses   Pelvis: External genitalia: normal general appearance  Urinary system: urethral meatus normal, bladder nontender  Vaginal: normal mucosa without prolapse or lesions  Cervix: normal appearance  Uterus: normal size, shape, position  Adnexa: normal size, nontender bilaterally   Extremities: Normal ROM; no edema, no cyanosis   Neurologial: Normal strength and tone. No focal numbness or weakness.   Psychiatric: normal mood, speech, dress, and thought processes         Assessment:       Healthy female exam.     1. Encntr for gyn exam (general) (routine) w/o  abn findings    2. Cervical cancer screening          Plan:      Problem List Items Addressed This Visit     None      Visit Diagnoses     Encntr for gyn exam (general) (routine) w/o abn findings    -  Primary    Cervical cancer screening        Relevant Orders    Liquid-Based Pap Smear, Screening    HPV High Risk Genotypes, PCR        COUNSELING:  Ermelinda was counseled on  A.C.O.G. Pap guidelines and recommendations for yearly pelvic exams in addition to recommendations for monthly self breast exams; to see her PCP for other health maintenance.

## 2020-12-21 LAB
HPV HR 12 DNA SPEC QL NAA+PROBE: NEGATIVE
HPV16 AG SPEC QL: NEGATIVE
HPV18 DNA SPEC QL NAA+PROBE: NEGATIVE

## 2020-12-22 ENCOUNTER — PATIENT MESSAGE (OUTPATIENT)
Dept: PHARMACY | Facility: CLINIC | Age: 60
End: 2020-12-22

## 2020-12-29 ENCOUNTER — SPECIALTY PHARMACY (OUTPATIENT)
Dept: PHARMACY | Facility: CLINIC | Age: 60
End: 2020-12-29

## 2020-12-29 NOTE — TELEPHONE ENCOUNTER
Specialty Pharmacy - Refill Coordination    Specialty Medication Orders Linked to Encounter      Most Recent Value   Medication #1  tofacitinib (XELJANZ) 5 mg Tab (Order#[id number], Rx#[id number])          Refill Questions - Documented Responses      Most Recent Value   Relationship to patient of person spoken to?  Self   HIPAA/medical authority confirmed?  Yes   Any changes in contact preferences or allowed representatives?  No   Has the patient had any insurance changes?  No   Has the patient had any changes to specialty medication, dose, or instructions?  No   Has the patient started taking any new medications, herbals, or supplements?  No   Has the patient been diagnosed with any new medical conditions?  No   Does the patient have any new allergies to medications or foods?  No   Does the patient have any concerns about side effects?  No   Can the patient store medication/sharps container properly (at the correct temperature, away from children/pets, etc.)?  Yes   Can the patient call emergency services (911) in the event of an emergency?  Yes   Does the patient have any concerns or questions about taking or administering this medication as prescribed?  No   How many doses did the patient miss in the past 4 weeks or since the last fill?  0   How many doses does the patient have on hand?  10   How many days does the patient report on hand quantity will last?  5   Does the number of doses/days supply remaining match pharmacy expected amounts?  Yes   Does the patient feel that this medication is effective?  Yes   During the past 4 weeks, has patient missed any activities due to condition or medication?  No   During the past 4 weeks, did patient have any of the following urgent care visits?  None   How will the patient receive the medication?  Mail   When does the patient need to receive the medication?  01/03/21   Shipping Address  Home   Address in [facility] confirmed and updated if neccessary?  Yes    Expected Copay ($)  0   Is the patient able to afford the medication copay?  Yes   Payment Method  zero copay   Days supply of Refill  30   Would patient like to speak to a pharmacist?  No   Do you want to trigger an intervention?  No   Do you want to trigger an additional referral task?  No   Refill activity completed?  Yes   Refill activity plan  Refill scheduled   Shipment/Pickup Date:  12/29/20          Current Outpatient Medications   Medication Sig    amLODIPine (NORVASC) 10 MG tablet Take 1 tablet (10 mg total) by mouth once daily.    azelastine (ASTELIN) 137 mcg (0.1 %) nasal spray 1 spray (137 mcg total) by Nasal route 2 (two) times daily.    calcium carbonate (OS-ESTELA) 600 mg calcium (1,500 mg) Tab Take 600 mg by mouth 2 (two) times daily with meals.    clobetasoL (TEMOVATE) 0.05 % cream Apply topically 2 (two) times daily.    levocetirizine (XYZAL) 5 MG tablet TAKE 1 TABLET(5 MG) BY MOUTH EVERY EVENING    magnesium 30 mg Tab Take by mouth once.    multivitamin capsule Take 1 capsule by mouth once daily.    tofacitinib (XELJANZ) 5 mg Tab Take 5 mg by mouth 2 (two) times daily.    triamcinolone acetonide 0.025% (KENALOG) 0.025 % cream AAA on face BID x 1-2 wks then prn flares only    vitamin D (VITAMIN D3) 1000 units Tab Take 1,000 Units by mouth once daily.    vitamin E 1000 UNIT capsule Take 1,000 Units by mouth once daily.   Last reviewed on 12/15/2020 11:43 AM by Erin Mcdaniel MD    Review of patient's allergies indicates:  No Known Allergies Last reviewed on  12/15/2020 11:18 AM by Delmis Phan      Tasks added this encounter   No tasks added.   Tasks due within next 3 months   12/21/2020 - Refill Call (Auto Added)  2/14/2021 - Clinical - Follow Up Assesement (90 day)     Ayah Almeida  [facility]  [address] [place] [id number]  Phone: [phone]  Fax: [phone]

## 2021-01-05 ENCOUNTER — INFUSION (OUTPATIENT)
Dept: INFECTIOUS DISEASES | Facility: HOSPITAL | Age: 61
End: 2021-01-05
Attending: INTERNAL MEDICINE
Payer: COMMERCIAL

## 2021-01-05 VITALS
HEART RATE: 98 BPM | DIASTOLIC BLOOD PRESSURE: 67 MMHG | SYSTOLIC BLOOD PRESSURE: 144 MMHG | RESPIRATION RATE: 18 BRPM | BODY MASS INDEX: 28.56 KG/M2 | TEMPERATURE: 98 F | OXYGEN SATURATION: 100 % | WEIGHT: 171.44 LBS | HEIGHT: 65 IN

## 2021-01-05 DIAGNOSIS — M33.13 DERMATOMYOSITIS: Primary | ICD-10-CM

## 2021-01-05 DIAGNOSIS — R13.19 ESOPHAGEAL DYSPHAGIA: ICD-10-CM

## 2021-01-05 PROCEDURE — 96366 THER/PROPH/DIAG IV INF ADDON: CPT

## 2021-01-05 PROCEDURE — 25000003 PHARM REV CODE 250: Performed by: INTERNAL MEDICINE

## 2021-01-05 PROCEDURE — 63600175 PHARM REV CODE 636 W HCPCS: Performed by: INTERNAL MEDICINE

## 2021-01-05 PROCEDURE — 96367 TX/PROPH/DG ADDL SEQ IV INF: CPT

## 2021-01-05 PROCEDURE — 96365 THER/PROPH/DIAG IV INF INIT: CPT

## 2021-01-05 PROCEDURE — 96375 TX/PRO/DX INJ NEW DRUG ADDON: CPT

## 2021-01-05 RX ORDER — FAMOTIDINE 10 MG/ML
20 INJECTION INTRAVENOUS
Status: COMPLETED | OUTPATIENT
Start: 2021-01-05 | End: 2021-01-05

## 2021-01-05 RX ORDER — FAMOTIDINE 10 MG/ML
20 INJECTION INTRAVENOUS
Status: CANCELLED | OUTPATIENT
Start: 2021-01-26

## 2021-01-05 RX ORDER — HEPARIN 100 UNIT/ML
500 SYRINGE INTRAVENOUS
Status: DISCONTINUED | OUTPATIENT
Start: 2021-01-05 | End: 2021-01-05 | Stop reason: HOSPADM

## 2021-01-05 RX ORDER — ACETAMINOPHEN 325 MG/1
650 TABLET ORAL
Status: CANCELLED | OUTPATIENT
Start: 2021-01-26

## 2021-01-05 RX ORDER — SODIUM CHLORIDE 0.9 % (FLUSH) 0.9 %
10 SYRINGE (ML) INJECTION
Status: DISCONTINUED | OUTPATIENT
Start: 2021-01-05 | End: 2021-01-05 | Stop reason: HOSPADM

## 2021-01-05 RX ORDER — ACETAMINOPHEN 325 MG/1
650 TABLET ORAL
Status: COMPLETED | OUTPATIENT
Start: 2021-01-05 | End: 2021-01-05

## 2021-01-05 RX ORDER — SODIUM CHLORIDE 0.9 % (FLUSH) 0.9 %
10 SYRINGE (ML) INJECTION
Status: CANCELLED | OUTPATIENT
Start: 2021-01-26

## 2021-01-05 RX ORDER — HEPARIN 100 UNIT/ML
500 SYRINGE INTRAVENOUS
Status: CANCELLED | OUTPATIENT
Start: 2021-01-26

## 2021-01-05 RX ADMIN — SODIUM CHLORIDE: 0.9 INJECTION, SOLUTION INTRAVENOUS at 08:01

## 2021-01-05 RX ADMIN — ACETAMINOPHEN 650 MG: 325 TABLET ORAL at 08:01

## 2021-01-05 RX ADMIN — DIPHENHYDRAMINE HYDROCHLORIDE 50 MG: 50 INJECTION INTRAMUSCULAR; INTRAVENOUS at 08:01

## 2021-01-05 RX ADMIN — FAMOTIDINE 20 MG: 10 INJECTION INTRAVENOUS at 08:01

## 2021-01-05 RX ADMIN — HUMAN IMMUNOGLOBULIN G 80 G: 40 LIQUID INTRAVENOUS at 09:01

## 2021-01-06 ENCOUNTER — INFUSION (OUTPATIENT)
Dept: INFECTIOUS DISEASES | Facility: HOSPITAL | Age: 61
End: 2021-01-06
Attending: INTERNAL MEDICINE
Payer: COMMERCIAL

## 2021-01-06 VITALS
OXYGEN SATURATION: 99 % | BODY MASS INDEX: 28.16 KG/M2 | HEART RATE: 98 BPM | HEIGHT: 65 IN | DIASTOLIC BLOOD PRESSURE: 64 MMHG | TEMPERATURE: 99 F | RESPIRATION RATE: 18 BRPM | SYSTOLIC BLOOD PRESSURE: 140 MMHG | WEIGHT: 169 LBS

## 2021-01-06 DIAGNOSIS — M33.13 DERMATOMYOSITIS: Primary | ICD-10-CM

## 2021-01-06 DIAGNOSIS — R13.19 ESOPHAGEAL DYSPHAGIA: ICD-10-CM

## 2021-01-06 PROCEDURE — 96367 TX/PROPH/DG ADDL SEQ IV INF: CPT

## 2021-01-06 PROCEDURE — 96365 THER/PROPH/DIAG IV INF INIT: CPT

## 2021-01-06 PROCEDURE — 25000003 PHARM REV CODE 250: Performed by: INTERNAL MEDICINE

## 2021-01-06 PROCEDURE — 96375 TX/PRO/DX INJ NEW DRUG ADDON: CPT

## 2021-01-06 PROCEDURE — 63600175 PHARM REV CODE 636 W HCPCS: Performed by: INTERNAL MEDICINE

## 2021-01-06 PROCEDURE — 96366 THER/PROPH/DIAG IV INF ADDON: CPT

## 2021-01-06 RX ORDER — FAMOTIDINE 10 MG/ML
20 INJECTION INTRAVENOUS
Status: CANCELLED | OUTPATIENT
Start: 2021-02-02

## 2021-01-06 RX ORDER — ACETAMINOPHEN 325 MG/1
650 TABLET ORAL
Status: COMPLETED | OUTPATIENT
Start: 2021-01-06 | End: 2021-01-06

## 2021-01-06 RX ORDER — FAMOTIDINE 10 MG/ML
20 INJECTION INTRAVENOUS
Status: COMPLETED | OUTPATIENT
Start: 2021-01-06 | End: 2021-01-06

## 2021-01-06 RX ORDER — HEPARIN 100 UNIT/ML
500 SYRINGE INTRAVENOUS
Status: DISCONTINUED | OUTPATIENT
Start: 2021-01-06 | End: 2021-01-06 | Stop reason: HOSPADM

## 2021-01-06 RX ORDER — ACETAMINOPHEN 325 MG/1
650 TABLET ORAL
Status: CANCELLED | OUTPATIENT
Start: 2021-02-02

## 2021-01-06 RX ORDER — SODIUM CHLORIDE 0.9 % (FLUSH) 0.9 %
10 SYRINGE (ML) INJECTION
Status: DISCONTINUED | OUTPATIENT
Start: 2021-01-06 | End: 2021-01-06 | Stop reason: HOSPADM

## 2021-01-06 RX ORDER — HEPARIN 100 UNIT/ML
500 SYRINGE INTRAVENOUS
Status: CANCELLED | OUTPATIENT
Start: 2021-02-02

## 2021-01-06 RX ORDER — SODIUM CHLORIDE 0.9 % (FLUSH) 0.9 %
10 SYRINGE (ML) INJECTION
Status: CANCELLED | OUTPATIENT
Start: 2021-02-02

## 2021-01-06 RX ADMIN — ACETAMINOPHEN 650 MG: 325 TABLET ORAL at 08:01

## 2021-01-06 RX ADMIN — DIPHENHYDRAMINE HYDROCHLORIDE 50 MG: 50 INJECTION INTRAMUSCULAR; INTRAVENOUS at 08:01

## 2021-01-06 RX ADMIN — FAMOTIDINE 20 MG: 10 INJECTION INTRAVENOUS at 08:01

## 2021-01-06 RX ADMIN — SODIUM CHLORIDE: 0.9 INJECTION, SOLUTION INTRAVENOUS at 08:01

## 2021-01-06 RX ADMIN — HUMAN IMMUNOGLOBULIN G 75 G: 40 LIQUID INTRAVENOUS at 09:01

## 2021-01-19 ENCOUNTER — LAB VISIT (OUTPATIENT)
Dept: PRIMARY CARE CLINIC | Facility: OTHER | Age: 61
End: 2021-01-19
Attending: INTERNAL MEDICINE
Payer: COMMERCIAL

## 2021-01-19 DIAGNOSIS — Z20.822 ENCOUNTER FOR LABORATORY TESTING FOR COVID-19 VIRUS: ICD-10-CM

## 2021-01-19 PROCEDURE — U0003 INFECTIOUS AGENT DETECTION BY NUCLEIC ACID (DNA OR RNA); SEVERE ACUTE RESPIRATORY SYNDROME CORONAVIRUS 2 (SARS-COV-2) (CORONAVIRUS DISEASE [COVID-19]), AMPLIFIED PROBE TECHNIQUE, MAKING USE OF HIGH THROUGHPUT TECHNOLOGIES AS DESCRIBED BY CMS-2020-01-R: HCPCS

## 2021-01-21 LAB — SARS-COV-2 RNA RESP QL NAA+PROBE: NOT DETECTED

## 2021-01-24 LAB
FINAL PATHOLOGIC DIAGNOSIS: NORMAL
Lab: NORMAL

## 2021-01-29 ENCOUNTER — HOSPITAL ENCOUNTER (OUTPATIENT)
Dept: RADIOLOGY | Facility: HOSPITAL | Age: 61
Discharge: HOME | End: 2021-01-29
Attending: INTERNAL MEDICINE
Payer: COMMERCIAL

## 2021-01-29 ENCOUNTER — SPECIALTY PHARMACY (OUTPATIENT)
Dept: PHARMACY | Facility: CLINIC | Age: 61
End: 2021-01-29

## 2021-01-29 DIAGNOSIS — Z17.1 MALIGNANT NEOPLASM OF UPPER-OUTER QUADRANT OF LEFT BREAST IN FEMALE, ESTROGEN RECEPTOR NEGATIVE: Chronic | ICD-10-CM

## 2021-01-29 DIAGNOSIS — Z17.1 MALIGNANT NEOPLASM OF UPPER-OUTER QUADRANT OF LEFT BREAST IN FEMALE, ESTROGEN RECEPTOR NEGATIVE: ICD-10-CM

## 2021-01-29 DIAGNOSIS — C50.412 MALIGNANT NEOPLASM OF UPPER-OUTER QUADRANT OF LEFT BREAST IN FEMALE, ESTROGEN RECEPTOR NEGATIVE: ICD-10-CM

## 2021-01-29 DIAGNOSIS — C50.412 MALIGNANT NEOPLASM OF UPPER-OUTER QUADRANT OF LEFT BREAST IN FEMALE, ESTROGEN RECEPTOR NEGATIVE: Chronic | ICD-10-CM

## 2021-01-29 PROCEDURE — 71260 CT NECK CHEST WITH CONTRAST (XPD): ICD-10-PCS | Mod: 26,,, | Performed by: RADIOLOGY

## 2021-01-29 PROCEDURE — 25500020 PHARM REV CODE 255: Performed by: INTERNAL MEDICINE

## 2021-01-29 PROCEDURE — 74177 CT ABD & PELVIS W/CONTRAST: CPT | Mod: TC

## 2021-01-29 PROCEDURE — 74177 CT ABD & PELVIS W/CONTRAST: CPT | Mod: 26,,, | Performed by: RADIOLOGY

## 2021-01-29 PROCEDURE — 70491 CT SOFT TISSUE NECK W/DYE: CPT | Mod: TC

## 2021-01-29 PROCEDURE — 70491 CT SOFT TISSUE NECK W/DYE: CPT | Mod: 26,,, | Performed by: RADIOLOGY

## 2021-01-29 PROCEDURE — 74177 CT ABDOMEN PELVIS WITH CONTRAST: ICD-10-PCS | Mod: 26,,, | Performed by: RADIOLOGY

## 2021-01-29 PROCEDURE — 71260 CT THORAX DX C+: CPT | Mod: 26,,, | Performed by: RADIOLOGY

## 2021-01-29 PROCEDURE — 70491 CT NECK CHEST WITH CONTRAST (XPD): ICD-10-PCS | Mod: 26,,, | Performed by: RADIOLOGY

## 2021-01-29 RX ADMIN — IOHEXOL 100 ML: 350 INJECTION, SOLUTION INTRAVENOUS at 10:01

## 2021-02-01 ENCOUNTER — SPECIALTY PHARMACY (OUTPATIENT)
Dept: PHARMACY | Facility: CLINIC | Age: 61
End: 2021-02-01

## 2021-02-01 ENCOUNTER — OFFICE VISIT (OUTPATIENT)
Dept: HEMATOLOGY/ONCOLOGY | Facility: CLINIC | Age: 61
End: 2021-02-01
Payer: COMMERCIAL

## 2021-02-01 VITALS
DIASTOLIC BLOOD PRESSURE: 85 MMHG | BODY MASS INDEX: 28.02 KG/M2 | HEART RATE: 95 BPM | WEIGHT: 168.19 LBS | RESPIRATION RATE: 18 BRPM | OXYGEN SATURATION: 100 % | SYSTOLIC BLOOD PRESSURE: 130 MMHG | HEIGHT: 65 IN

## 2021-02-01 DIAGNOSIS — C50.412 MALIGNANT NEOPLASM OF UPPER-OUTER QUADRANT OF LEFT BREAST IN FEMALE, ESTROGEN RECEPTOR NEGATIVE: Primary | Chronic | ICD-10-CM

## 2021-02-01 DIAGNOSIS — Z17.1 MALIGNANT NEOPLASM OF UPPER-OUTER QUADRANT OF LEFT BREAST IN FEMALE, ESTROGEN RECEPTOR NEGATIVE: Primary | Chronic | ICD-10-CM

## 2021-02-01 LAB
CREAT SERPL-MCNC: 0.5 MG/DL (ref 0.5–1.4)
SAMPLE: NORMAL

## 2021-02-01 PROCEDURE — 99999 PR PBB SHADOW E&M-EST. PATIENT-LVL IV: CPT | Mod: PBBFAC,,, | Performed by: INTERNAL MEDICINE

## 2021-02-01 PROCEDURE — 3079F DIAST BP 80-89 MM HG: CPT | Mod: CPTII,S$GLB,, | Performed by: INTERNAL MEDICINE

## 2021-02-01 PROCEDURE — 3075F PR MOST RECENT SYSTOLIC BLOOD PRESS GE 130-139MM HG: ICD-10-PCS | Mod: CPTII,S$GLB,, | Performed by: INTERNAL MEDICINE

## 2021-02-01 PROCEDURE — 3079F PR MOST RECENT DIASTOLIC BLOOD PRESSURE 80-89 MM HG: ICD-10-PCS | Mod: CPTII,S$GLB,, | Performed by: INTERNAL MEDICINE

## 2021-02-01 PROCEDURE — 99214 OFFICE O/P EST MOD 30 MIN: CPT | Mod: S$GLB,,, | Performed by: INTERNAL MEDICINE

## 2021-02-01 PROCEDURE — 1126F PR PAIN SEVERITY QUANTIFIED, NO PAIN PRESENT: ICD-10-PCS | Mod: S$GLB,,, | Performed by: INTERNAL MEDICINE

## 2021-02-01 PROCEDURE — 3008F PR BODY MASS INDEX (BMI) DOCUMENTED: ICD-10-PCS | Mod: CPTII,S$GLB,, | Performed by: INTERNAL MEDICINE

## 2021-02-01 PROCEDURE — 99999 PR PBB SHADOW E&M-EST. PATIENT-LVL IV: ICD-10-PCS | Mod: PBBFAC,,, | Performed by: INTERNAL MEDICINE

## 2021-02-01 PROCEDURE — 99214 PR OFFICE/OUTPT VISIT, EST, LEVL IV, 30-39 MIN: ICD-10-PCS | Mod: S$GLB,,, | Performed by: INTERNAL MEDICINE

## 2021-02-01 PROCEDURE — 1126F AMNT PAIN NOTED NONE PRSNT: CPT | Mod: S$GLB,,, | Performed by: INTERNAL MEDICINE

## 2021-02-01 PROCEDURE — 3075F SYST BP GE 130 - 139MM HG: CPT | Mod: CPTII,S$GLB,, | Performed by: INTERNAL MEDICINE

## 2021-02-01 PROCEDURE — 3008F BODY MASS INDEX DOCD: CPT | Mod: CPTII,S$GLB,, | Performed by: INTERNAL MEDICINE

## 2021-02-02 ENCOUNTER — INFUSION (OUTPATIENT)
Dept: INFECTIOUS DISEASES | Facility: HOSPITAL | Age: 61
End: 2021-02-02
Attending: INTERNAL MEDICINE
Payer: COMMERCIAL

## 2021-02-02 VITALS
RESPIRATION RATE: 18 BRPM | OXYGEN SATURATION: 99 % | SYSTOLIC BLOOD PRESSURE: 131 MMHG | WEIGHT: 167.13 LBS | BODY MASS INDEX: 27.85 KG/M2 | HEIGHT: 65 IN | DIASTOLIC BLOOD PRESSURE: 67 MMHG | HEART RATE: 100 BPM | TEMPERATURE: 98 F

## 2021-02-02 DIAGNOSIS — R13.19 ESOPHAGEAL DYSPHAGIA: ICD-10-CM

## 2021-02-02 DIAGNOSIS — M33.13 DERMATOMYOSITIS: Primary | ICD-10-CM

## 2021-02-02 PROCEDURE — 96365 THER/PROPH/DIAG IV INF INIT: CPT

## 2021-02-02 PROCEDURE — 96375 TX/PRO/DX INJ NEW DRUG ADDON: CPT

## 2021-02-02 PROCEDURE — 63600175 PHARM REV CODE 636 W HCPCS: Mod: JG | Performed by: INTERNAL MEDICINE

## 2021-02-02 PROCEDURE — 96367 TX/PROPH/DG ADDL SEQ IV INF: CPT

## 2021-02-02 PROCEDURE — 25000003 PHARM REV CODE 250: Performed by: INTERNAL MEDICINE

## 2021-02-02 PROCEDURE — 96366 THER/PROPH/DIAG IV INF ADDON: CPT

## 2021-02-02 RX ORDER — ACETAMINOPHEN 325 MG/1
650 TABLET ORAL
Status: CANCELLED | OUTPATIENT
Start: 2021-03-02

## 2021-02-02 RX ORDER — FAMOTIDINE 10 MG/ML
20 INJECTION INTRAVENOUS
Status: COMPLETED | OUTPATIENT
Start: 2021-02-02 | End: 2021-02-02

## 2021-02-02 RX ORDER — HEPARIN 100 UNIT/ML
500 SYRINGE INTRAVENOUS
Status: CANCELLED | OUTPATIENT
Start: 2021-03-02

## 2021-02-02 RX ORDER — HEPARIN 100 UNIT/ML
500 SYRINGE INTRAVENOUS
Status: DISCONTINUED | OUTPATIENT
Start: 2021-02-02 | End: 2021-02-02 | Stop reason: HOSPADM

## 2021-02-02 RX ORDER — SODIUM CHLORIDE 0.9 % (FLUSH) 0.9 %
10 SYRINGE (ML) INJECTION
Status: DISCONTINUED | OUTPATIENT
Start: 2021-02-02 | End: 2021-02-02 | Stop reason: HOSPADM

## 2021-02-02 RX ORDER — FAMOTIDINE 10 MG/ML
20 INJECTION INTRAVENOUS
Status: CANCELLED | OUTPATIENT
Start: 2021-03-02

## 2021-02-02 RX ORDER — ACETAMINOPHEN 325 MG/1
650 TABLET ORAL
Status: COMPLETED | OUTPATIENT
Start: 2021-02-02 | End: 2021-02-02

## 2021-02-02 RX ORDER — SODIUM CHLORIDE 0.9 % (FLUSH) 0.9 %
10 SYRINGE (ML) INJECTION
Status: CANCELLED | OUTPATIENT
Start: 2021-03-02

## 2021-02-02 RX ADMIN — DIPHENHYDRAMINE HYDROCHLORIDE 50 MG: 50 INJECTION INTRAMUSCULAR; INTRAVENOUS at 09:02

## 2021-02-02 RX ADMIN — HUMAN IMMUNOGLOBULIN G 75 G: 5 LIQUID INTRAVENOUS at 09:02

## 2021-02-02 RX ADMIN — ACETAMINOPHEN 650 MG: 325 TABLET ORAL at 09:02

## 2021-02-02 RX ADMIN — SODIUM CHLORIDE: 0.9 INJECTION, SOLUTION INTRAVENOUS at 09:02

## 2021-02-02 RX ADMIN — FAMOTIDINE 20 MG: 10 INJECTION INTRAVENOUS at 09:02

## 2021-02-03 ENCOUNTER — INFUSION (OUTPATIENT)
Dept: INFECTIOUS DISEASES | Facility: HOSPITAL | Age: 61
End: 2021-02-03
Attending: INTERNAL MEDICINE
Payer: COMMERCIAL

## 2021-02-03 VITALS
HEART RATE: 105 BPM | WEIGHT: 168.31 LBS | RESPIRATION RATE: 18 BRPM | DIASTOLIC BLOOD PRESSURE: 72 MMHG | HEIGHT: 65 IN | OXYGEN SATURATION: 100 % | TEMPERATURE: 98 F | SYSTOLIC BLOOD PRESSURE: 147 MMHG | BODY MASS INDEX: 28.04 KG/M2

## 2021-02-03 DIAGNOSIS — R13.19 ESOPHAGEAL DYSPHAGIA: ICD-10-CM

## 2021-02-03 DIAGNOSIS — M33.13 DERMATOMYOSITIS: Primary | ICD-10-CM

## 2021-02-03 PROCEDURE — 63600175 PHARM REV CODE 636 W HCPCS: Mod: JG | Performed by: INTERNAL MEDICINE

## 2021-02-03 PROCEDURE — 96367 TX/PROPH/DG ADDL SEQ IV INF: CPT

## 2021-02-03 PROCEDURE — 96365 THER/PROPH/DIAG IV INF INIT: CPT

## 2021-02-03 PROCEDURE — 25000003 PHARM REV CODE 250: Performed by: INTERNAL MEDICINE

## 2021-02-03 PROCEDURE — 96375 TX/PRO/DX INJ NEW DRUG ADDON: CPT

## 2021-02-03 PROCEDURE — 96366 THER/PROPH/DIAG IV INF ADDON: CPT

## 2021-02-03 RX ORDER — ACETAMINOPHEN 325 MG/1
650 TABLET ORAL
Status: CANCELLED | OUTPATIENT
Start: 2021-03-03

## 2021-02-03 RX ORDER — HEPARIN 100 UNIT/ML
500 SYRINGE INTRAVENOUS
Status: CANCELLED | OUTPATIENT
Start: 2021-03-03

## 2021-02-03 RX ORDER — HEPARIN 100 UNIT/ML
500 SYRINGE INTRAVENOUS
Status: DISCONTINUED | OUTPATIENT
Start: 2021-02-03 | End: 2021-02-03 | Stop reason: HOSPADM

## 2021-02-03 RX ORDER — SODIUM CHLORIDE 0.9 % (FLUSH) 0.9 %
10 SYRINGE (ML) INJECTION
Status: DISCONTINUED | OUTPATIENT
Start: 2021-02-03 | End: 2021-02-03 | Stop reason: HOSPADM

## 2021-02-03 RX ORDER — FAMOTIDINE 10 MG/ML
20 INJECTION INTRAVENOUS
Status: CANCELLED | OUTPATIENT
Start: 2021-03-03

## 2021-02-03 RX ORDER — SODIUM CHLORIDE 0.9 % (FLUSH) 0.9 %
10 SYRINGE (ML) INJECTION
Status: CANCELLED | OUTPATIENT
Start: 2021-03-03

## 2021-02-03 RX ORDER — FAMOTIDINE 10 MG/ML
20 INJECTION INTRAVENOUS
Status: COMPLETED | OUTPATIENT
Start: 2021-02-03 | End: 2021-02-03

## 2021-02-03 RX ORDER — ACETAMINOPHEN 325 MG/1
650 TABLET ORAL
Status: COMPLETED | OUTPATIENT
Start: 2021-02-03 | End: 2021-02-03

## 2021-02-03 RX ADMIN — DIPHENHYDRAMINE HYDROCHLORIDE 50 MG: 50 INJECTION INTRAMUSCULAR; INTRAVENOUS at 08:02

## 2021-02-03 RX ADMIN — ACETAMINOPHEN 650 MG: 325 TABLET ORAL at 08:02

## 2021-02-03 RX ADMIN — SODIUM CHLORIDE: 0.9 INJECTION, SOLUTION INTRAVENOUS at 08:02

## 2021-02-03 RX ADMIN — HUMAN IMMUNOGLOBULIN G 75 G: 40 LIQUID INTRAVENOUS at 09:02

## 2021-02-03 RX ADMIN — FAMOTIDINE 20 MG: 10 INJECTION INTRAVENOUS at 08:02

## 2021-02-08 ENCOUNTER — TELEPHONE (OUTPATIENT)
Dept: HEMATOLOGY/ONCOLOGY | Facility: CLINIC | Age: 61
End: 2021-02-08

## 2021-02-23 ENCOUNTER — PATIENT MESSAGE (OUTPATIENT)
Dept: RHEUMATOLOGY | Facility: CLINIC | Age: 61
End: 2021-02-23

## 2021-02-25 ENCOUNTER — OFFICE VISIT (OUTPATIENT)
Dept: RHEUMATOLOGY | Facility: CLINIC | Age: 61
End: 2021-02-25
Payer: COMMERCIAL

## 2021-02-25 VITALS
BODY MASS INDEX: 29.39 KG/M2 | WEIGHT: 176.56 LBS | DIASTOLIC BLOOD PRESSURE: 85 MMHG | SYSTOLIC BLOOD PRESSURE: 142 MMHG | HEART RATE: 91 BPM

## 2021-02-25 DIAGNOSIS — M33.13 DERMATOMYOSITIS: Primary | ICD-10-CM

## 2021-02-25 PROCEDURE — 3077F PR MOST RECENT SYSTOLIC BLOOD PRESSURE >= 140 MM HG: ICD-10-PCS | Mod: CPTII,S$GLB,, | Performed by: INTERNAL MEDICINE

## 2021-02-25 PROCEDURE — 3077F SYST BP >= 140 MM HG: CPT | Mod: CPTII,S$GLB,, | Performed by: INTERNAL MEDICINE

## 2021-02-25 PROCEDURE — 3079F PR MOST RECENT DIASTOLIC BLOOD PRESSURE 80-89 MM HG: ICD-10-PCS | Mod: CPTII,S$GLB,, | Performed by: INTERNAL MEDICINE

## 2021-02-25 PROCEDURE — 99214 PR OFFICE/OUTPT VISIT, EST, LEVL IV, 30-39 MIN: ICD-10-PCS | Mod: S$GLB,,, | Performed by: INTERNAL MEDICINE

## 2021-02-25 PROCEDURE — 3008F PR BODY MASS INDEX (BMI) DOCUMENTED: ICD-10-PCS | Mod: CPTII,S$GLB,, | Performed by: INTERNAL MEDICINE

## 2021-02-25 PROCEDURE — 3008F BODY MASS INDEX DOCD: CPT | Mod: CPTII,S$GLB,, | Performed by: INTERNAL MEDICINE

## 2021-02-25 PROCEDURE — 99999 PR PBB SHADOW E&M-EST. PATIENT-LVL III: ICD-10-PCS | Mod: PBBFAC,,, | Performed by: INTERNAL MEDICINE

## 2021-02-25 PROCEDURE — 3079F DIAST BP 80-89 MM HG: CPT | Mod: CPTII,S$GLB,, | Performed by: INTERNAL MEDICINE

## 2021-02-25 PROCEDURE — 99999 PR PBB SHADOW E&M-EST. PATIENT-LVL III: CPT | Mod: PBBFAC,,, | Performed by: INTERNAL MEDICINE

## 2021-02-25 PROCEDURE — 1126F AMNT PAIN NOTED NONE PRSNT: CPT | Mod: S$GLB,,, | Performed by: INTERNAL MEDICINE

## 2021-02-25 PROCEDURE — 1126F PR PAIN SEVERITY QUANTIFIED, NO PAIN PRESENT: ICD-10-PCS | Mod: S$GLB,,, | Performed by: INTERNAL MEDICINE

## 2021-02-25 PROCEDURE — 99214 OFFICE O/P EST MOD 30 MIN: CPT | Mod: S$GLB,,, | Performed by: INTERNAL MEDICINE

## 2021-02-25 ASSESSMENT — ROUTINE ASSESSMENT OF PATIENT INDEX DATA (RAPID3)
AM STIFFNESS SCORE: 1, YES
PAIN SCORE: 1
PSYCHOLOGICAL DISTRESS SCORE: 3.3
MDHAQ FUNCTION SCORE: 0.6
TOTAL RAPID3 SCORE: 1.83
PATIENT GLOBAL ASSESSMENT SCORE: 2.5
FATIGUE SCORE: 1.5

## 2021-03-02 ENCOUNTER — INFUSION (OUTPATIENT)
Dept: INFECTIOUS DISEASES | Facility: HOSPITAL | Age: 61
End: 2021-03-02
Attending: INTERNAL MEDICINE
Payer: COMMERCIAL

## 2021-03-02 VITALS
SYSTOLIC BLOOD PRESSURE: 133 MMHG | RESPIRATION RATE: 18 BRPM | HEIGHT: 65 IN | DIASTOLIC BLOOD PRESSURE: 72 MMHG | BODY MASS INDEX: 28.65 KG/M2 | HEART RATE: 100 BPM | TEMPERATURE: 99 F | WEIGHT: 171.94 LBS | OXYGEN SATURATION: 100 %

## 2021-03-02 DIAGNOSIS — R13.19 ESOPHAGEAL DYSPHAGIA: ICD-10-CM

## 2021-03-02 DIAGNOSIS — M33.13 DERMATOMYOSITIS: Primary | ICD-10-CM

## 2021-03-02 PROCEDURE — 96365 THER/PROPH/DIAG IV INF INIT: CPT

## 2021-03-02 PROCEDURE — 63600175 PHARM REV CODE 636 W HCPCS: Performed by: INTERNAL MEDICINE

## 2021-03-02 PROCEDURE — 96375 TX/PRO/DX INJ NEW DRUG ADDON: CPT

## 2021-03-02 PROCEDURE — 96367 TX/PROPH/DG ADDL SEQ IV INF: CPT

## 2021-03-02 PROCEDURE — 96366 THER/PROPH/DIAG IV INF ADDON: CPT

## 2021-03-02 PROCEDURE — 25000003 PHARM REV CODE 250: Performed by: INTERNAL MEDICINE

## 2021-03-02 RX ORDER — HEPARIN 100 UNIT/ML
500 SYRINGE INTRAVENOUS
Status: CANCELLED | OUTPATIENT
Start: 2021-03-30

## 2021-03-02 RX ORDER — FAMOTIDINE 10 MG/ML
20 INJECTION INTRAVENOUS
Status: CANCELLED | OUTPATIENT
Start: 2021-03-30

## 2021-03-02 RX ORDER — FAMOTIDINE 10 MG/ML
20 INJECTION INTRAVENOUS
Status: COMPLETED | OUTPATIENT
Start: 2021-03-02 | End: 2021-03-02

## 2021-03-02 RX ORDER — SODIUM CHLORIDE 0.9 % (FLUSH) 0.9 %
10 SYRINGE (ML) INJECTION
Status: CANCELLED | OUTPATIENT
Start: 2021-03-30

## 2021-03-02 RX ORDER — ACETAMINOPHEN 325 MG/1
650 TABLET ORAL
Status: COMPLETED | OUTPATIENT
Start: 2021-03-02 | End: 2021-03-02

## 2021-03-02 RX ORDER — HEPARIN 100 UNIT/ML
500 SYRINGE INTRAVENOUS
Status: DISCONTINUED | OUTPATIENT
Start: 2021-03-02 | End: 2021-03-02 | Stop reason: HOSPADM

## 2021-03-02 RX ORDER — SODIUM CHLORIDE 0.9 % (FLUSH) 0.9 %
10 SYRINGE (ML) INJECTION
Status: DISCONTINUED | OUTPATIENT
Start: 2021-03-02 | End: 2021-03-02 | Stop reason: HOSPADM

## 2021-03-02 RX ORDER — ACETAMINOPHEN 325 MG/1
650 TABLET ORAL
Status: CANCELLED | OUTPATIENT
Start: 2021-03-30

## 2021-03-02 RX ADMIN — HUMAN IMMUNOGLOBULIN G 80 G: 40 LIQUID INTRAVENOUS at 09:03

## 2021-03-02 RX ADMIN — DIPHENHYDRAMINE HYDROCHLORIDE 50 MG: 50 INJECTION INTRAMUSCULAR; INTRAVENOUS at 08:03

## 2021-03-02 RX ADMIN — FAMOTIDINE 20 MG: 10 INJECTION INTRAVENOUS at 08:03

## 2021-03-02 RX ADMIN — ACETAMINOPHEN 650 MG: 325 TABLET ORAL at 08:03

## 2021-03-02 RX ADMIN — SODIUM CHLORIDE: 0.9 INJECTION, SOLUTION INTRAVENOUS at 08:03

## 2021-03-03 ENCOUNTER — INFUSION (OUTPATIENT)
Dept: INFECTIOUS DISEASES | Facility: HOSPITAL | Age: 61
End: 2021-03-03
Attending: INTERNAL MEDICINE
Payer: COMMERCIAL

## 2021-03-03 ENCOUNTER — TELEPHONE (OUTPATIENT)
Dept: HEMATOLOGY/ONCOLOGY | Facility: CLINIC | Age: 61
End: 2021-03-03

## 2021-03-03 VITALS
SYSTOLIC BLOOD PRESSURE: 126 MMHG | BODY MASS INDEX: 28.27 KG/M2 | RESPIRATION RATE: 16 BRPM | WEIGHT: 169.88 LBS | DIASTOLIC BLOOD PRESSURE: 69 MMHG | TEMPERATURE: 99 F | OXYGEN SATURATION: 97 %

## 2021-03-03 DIAGNOSIS — R13.19 ESOPHAGEAL DYSPHAGIA: ICD-10-CM

## 2021-03-03 DIAGNOSIS — M33.13 DERMATOMYOSITIS: Primary | ICD-10-CM

## 2021-03-03 PROCEDURE — 25000003 PHARM REV CODE 250: Performed by: INTERNAL MEDICINE

## 2021-03-03 PROCEDURE — 63600175 PHARM REV CODE 636 W HCPCS: Performed by: INTERNAL MEDICINE

## 2021-03-03 PROCEDURE — 96367 TX/PROPH/DG ADDL SEQ IV INF: CPT

## 2021-03-03 PROCEDURE — 96375 TX/PRO/DX INJ NEW DRUG ADDON: CPT

## 2021-03-03 PROCEDURE — 96366 THER/PROPH/DIAG IV INF ADDON: CPT

## 2021-03-03 PROCEDURE — 96365 THER/PROPH/DIAG IV INF INIT: CPT

## 2021-03-03 RX ORDER — SODIUM CHLORIDE 0.9 % (FLUSH) 0.9 %
10 SYRINGE (ML) INJECTION
Status: CANCELLED | OUTPATIENT
Start: 2021-03-31

## 2021-03-03 RX ORDER — FAMOTIDINE 10 MG/ML
20 INJECTION INTRAVENOUS
Status: COMPLETED | OUTPATIENT
Start: 2021-03-03 | End: 2021-03-03

## 2021-03-03 RX ORDER — FAMOTIDINE 10 MG/ML
20 INJECTION INTRAVENOUS
Status: CANCELLED | OUTPATIENT
Start: 2021-03-31

## 2021-03-03 RX ORDER — ACETAMINOPHEN 325 MG/1
650 TABLET ORAL
Status: COMPLETED | OUTPATIENT
Start: 2021-03-03 | End: 2021-03-03

## 2021-03-03 RX ORDER — HEPARIN 100 UNIT/ML
500 SYRINGE INTRAVENOUS
Status: CANCELLED | OUTPATIENT
Start: 2021-03-31

## 2021-03-03 RX ORDER — ACETAMINOPHEN 325 MG/1
650 TABLET ORAL
Status: CANCELLED | OUTPATIENT
Start: 2021-03-31

## 2021-03-03 RX ADMIN — FAMOTIDINE 20 MG: 10 INJECTION INTRAVENOUS at 08:03

## 2021-03-03 RX ADMIN — ACETAMINOPHEN 650 MG: 325 TABLET ORAL at 08:03

## 2021-03-03 RX ADMIN — SODIUM CHLORIDE: 0.9 INJECTION, SOLUTION INTRAVENOUS at 08:03

## 2021-03-03 RX ADMIN — HUMAN IMMUNOGLOBULIN G 75 G: 40 LIQUID INTRAVENOUS at 09:03

## 2021-03-03 RX ADMIN — DIPHENHYDRAMINE HYDROCHLORIDE 50 MG: 50 INJECTION INTRAMUSCULAR; INTRAVENOUS at 08:03

## 2021-03-05 ENCOUNTER — OFFICE VISIT (OUTPATIENT)
Dept: HEMATOLOGY/ONCOLOGY | Facility: CLINIC | Age: 61
End: 2021-03-05
Payer: COMMERCIAL

## 2021-03-05 ENCOUNTER — HOSPITAL ENCOUNTER (OUTPATIENT)
Dept: RADIOLOGY | Facility: OTHER | Age: 61
Discharge: HOME | End: 2021-03-05
Attending: NURSE PRACTITIONER
Payer: COMMERCIAL

## 2021-03-05 ENCOUNTER — PATIENT MESSAGE (OUTPATIENT)
Dept: HEMATOLOGY/ONCOLOGY | Facility: CLINIC | Age: 61
End: 2021-03-05

## 2021-03-05 ENCOUNTER — SPECIALTY PHARMACY (OUTPATIENT)
Dept: PHARMACY | Facility: CLINIC | Age: 61
End: 2021-03-05

## 2021-03-05 ENCOUNTER — TELEPHONE (OUTPATIENT)
Dept: HEMATOLOGY/ONCOLOGY | Facility: CLINIC | Age: 61
End: 2021-03-05

## 2021-03-05 VITALS
BODY MASS INDEX: 28.5 KG/M2 | DIASTOLIC BLOOD PRESSURE: 85 MMHG | HEART RATE: 111 BPM | WEIGHT: 171.06 LBS | HEIGHT: 65 IN | OXYGEN SATURATION: 100 % | TEMPERATURE: 99 F | SYSTOLIC BLOOD PRESSURE: 157 MMHG | RESPIRATION RATE: 18 BRPM

## 2021-03-05 DIAGNOSIS — R59.9 SWELLING OF LYMPH NODES: Primary | ICD-10-CM

## 2021-03-05 DIAGNOSIS — R59.9 SWELLING OF LYMPH NODES: ICD-10-CM

## 2021-03-05 PROCEDURE — 1126F AMNT PAIN NOTED NONE PRSNT: CPT | Mod: S$GLB,,, | Performed by: NURSE PRACTITIONER

## 2021-03-05 PROCEDURE — 99214 OFFICE O/P EST MOD 30 MIN: CPT | Mod: S$GLB,,, | Performed by: NURSE PRACTITIONER

## 2021-03-05 PROCEDURE — 3008F BODY MASS INDEX DOCD: CPT | Mod: CPTII,S$GLB,, | Performed by: NURSE PRACTITIONER

## 2021-03-05 PROCEDURE — 99999 PR PBB SHADOW E&M-EST. PATIENT-LVL IV: CPT | Mod: PBBFAC,,, | Performed by: NURSE PRACTITIONER

## 2021-03-05 PROCEDURE — 1126F PR PAIN SEVERITY QUANTIFIED, NO PAIN PRESENT: ICD-10-PCS | Mod: S$GLB,,, | Performed by: NURSE PRACTITIONER

## 2021-03-05 PROCEDURE — 76536 US SOFT TISSUE HEAD NECK THYROID: ICD-10-PCS | Mod: 26,,, | Performed by: RADIOLOGY

## 2021-03-05 PROCEDURE — 99999 PR PBB SHADOW E&M-EST. PATIENT-LVL IV: ICD-10-PCS | Mod: PBBFAC,,, | Performed by: NURSE PRACTITIONER

## 2021-03-05 PROCEDURE — 76536 US EXAM OF HEAD AND NECK: CPT | Mod: TC

## 2021-03-05 PROCEDURE — 76536 US EXAM OF HEAD AND NECK: CPT | Mod: 26,,, | Performed by: RADIOLOGY

## 2021-03-05 PROCEDURE — 99214 PR OFFICE/OUTPT VISIT, EST, LEVL IV, 30-39 MIN: ICD-10-PCS | Mod: S$GLB,,, | Performed by: NURSE PRACTITIONER

## 2021-03-05 PROCEDURE — 3008F PR BODY MASS INDEX (BMI) DOCUMENTED: ICD-10-PCS | Mod: CPTII,S$GLB,, | Performed by: NURSE PRACTITIONER

## 2021-03-08 ENCOUNTER — OFFICE VISIT (OUTPATIENT)
Dept: ENDOCRINOLOGY | Facility: CLINIC | Age: 61
End: 2021-03-08
Payer: COMMERCIAL

## 2021-03-08 ENCOUNTER — LAB VISIT (OUTPATIENT)
Dept: LAB | Facility: HOSPITAL | Age: 61
End: 2021-03-08
Payer: COMMERCIAL

## 2021-03-08 VITALS
RESPIRATION RATE: 16 BRPM | SYSTOLIC BLOOD PRESSURE: 150 MMHG | HEART RATE: 72 BPM | WEIGHT: 170 LBS | BODY MASS INDEX: 28.29 KG/M2 | DIASTOLIC BLOOD PRESSURE: 72 MMHG | OXYGEN SATURATION: 99 %

## 2021-03-08 DIAGNOSIS — M33.13 DERMATOMYOSITIS: ICD-10-CM

## 2021-03-08 DIAGNOSIS — R09.89 ABNORMAL VASCULAR FLOW: ICD-10-CM

## 2021-03-08 DIAGNOSIS — R59.9 SWELLING OF LYMPH NODES: ICD-10-CM

## 2021-03-08 DIAGNOSIS — E06.9 THYROIDITIS: Primary | ICD-10-CM

## 2021-03-08 LAB
THYROPEROXIDASE IGG SERPL-ACNC: 98.4 IU/ML
TSH SERPL DL<=0.005 MIU/L-ACNC: 1.4 UIU/ML (ref 0.4–4)

## 2021-03-08 PROCEDURE — 3078F PR MOST RECENT DIASTOLIC BLOOD PRESSURE < 80 MM HG: ICD-10-PCS | Mod: CPTII,S$GLB,, | Performed by: STUDENT IN AN ORGANIZED HEALTH CARE EDUCATION/TRAINING PROGRAM

## 2021-03-08 PROCEDURE — 3077F SYST BP >= 140 MM HG: CPT | Mod: CPTII,S$GLB,, | Performed by: STUDENT IN AN ORGANIZED HEALTH CARE EDUCATION/TRAINING PROGRAM

## 2021-03-08 PROCEDURE — 99204 PR OFFICE/OUTPT VISIT, NEW, LEVL IV, 45-59 MIN: ICD-10-PCS | Mod: S$GLB,,, | Performed by: STUDENT IN AN ORGANIZED HEALTH CARE EDUCATION/TRAINING PROGRAM

## 2021-03-08 PROCEDURE — 99204 OFFICE O/P NEW MOD 45 MIN: CPT | Mod: S$GLB,,, | Performed by: STUDENT IN AN ORGANIZED HEALTH CARE EDUCATION/TRAINING PROGRAM

## 2021-03-08 PROCEDURE — 3077F PR MOST RECENT SYSTOLIC BLOOD PRESSURE >= 140 MM HG: ICD-10-PCS | Mod: CPTII,S$GLB,, | Performed by: STUDENT IN AN ORGANIZED HEALTH CARE EDUCATION/TRAINING PROGRAM

## 2021-03-08 PROCEDURE — 99999 PR PBB SHADOW E&M-EST. PATIENT-LVL IV: CPT | Mod: PBBFAC,,, | Performed by: STUDENT IN AN ORGANIZED HEALTH CARE EDUCATION/TRAINING PROGRAM

## 2021-03-08 PROCEDURE — 3078F DIAST BP <80 MM HG: CPT | Mod: CPTII,S$GLB,, | Performed by: STUDENT IN AN ORGANIZED HEALTH CARE EDUCATION/TRAINING PROGRAM

## 2021-03-08 PROCEDURE — 36415 COLL VENOUS BLD VENIPUNCTURE: CPT | Performed by: NURSE PRACTITIONER

## 2021-03-08 PROCEDURE — 86376 MICROSOMAL ANTIBODY EACH: CPT | Performed by: NURSE PRACTITIONER

## 2021-03-08 PROCEDURE — 84443 ASSAY THYROID STIM HORMONE: CPT | Performed by: NURSE PRACTITIONER

## 2021-03-08 PROCEDURE — 99999 PR PBB SHADOW E&M-EST. PATIENT-LVL IV: ICD-10-PCS | Mod: PBBFAC,,, | Performed by: STUDENT IN AN ORGANIZED HEALTH CARE EDUCATION/TRAINING PROGRAM

## 2021-03-09 ENCOUNTER — PATIENT MESSAGE (OUTPATIENT)
Dept: ENDOCRINOLOGY | Facility: CLINIC | Age: 61
End: 2021-03-09

## 2021-03-09 DIAGNOSIS — E06.9 THYROIDITIS: Primary | ICD-10-CM

## 2021-03-11 ENCOUNTER — DOCUMENTATION ONLY (OUTPATIENT)
Dept: HEMATOLOGY/ONCOLOGY | Facility: CLINIC | Age: 61
End: 2021-03-11

## 2021-03-11 ENCOUNTER — PATIENT MESSAGE (OUTPATIENT)
Dept: HEMATOLOGY/ONCOLOGY | Facility: CLINIC | Age: 61
End: 2021-03-11

## 2021-03-11 DIAGNOSIS — R59.9 SWELLING OF LYMPH NODES: Primary | ICD-10-CM

## 2021-03-17 ENCOUNTER — CLINICAL SUPPORT (OUTPATIENT)
Dept: INTERVENTIONAL RADIOLOGY/VASCULAR | Facility: CLINIC | Age: 61
End: 2021-03-17
Payer: COMMERCIAL

## 2021-03-17 DIAGNOSIS — C50.412 MALIGNANT NEOPLASM OF UPPER-OUTER QUADRANT OF LEFT BREAST IN FEMALE, ESTROGEN RECEPTOR NEGATIVE: Primary | ICD-10-CM

## 2021-03-17 DIAGNOSIS — R59.1 LYMPHADENOPATHY: ICD-10-CM

## 2021-03-17 DIAGNOSIS — Z17.1 MALIGNANT NEOPLASM OF UPPER-OUTER QUADRANT OF LEFT BREAST IN FEMALE, ESTROGEN RECEPTOR NEGATIVE: Primary | ICD-10-CM

## 2021-03-17 PROCEDURE — 99204 PR OFFICE/OUTPT VISIT, NEW, LEVL IV, 45-59 MIN: ICD-10-PCS | Mod: 95,,, | Performed by: PHYSICIAN ASSISTANT

## 2021-03-17 PROCEDURE — 99204 OFFICE O/P NEW MOD 45 MIN: CPT | Mod: 95,,, | Performed by: PHYSICIAN ASSISTANT

## 2021-03-19 ENCOUNTER — TELEPHONE (OUTPATIENT)
Dept: RHEUMATOLOGY | Facility: CLINIC | Age: 61
End: 2021-03-19

## 2021-03-25 ENCOUNTER — OFFICE VISIT (OUTPATIENT)
Dept: DERMATOLOGY | Facility: CLINIC | Age: 61
End: 2021-03-25
Payer: COMMERCIAL

## 2021-03-25 DIAGNOSIS — L29.9 PRURITUS: ICD-10-CM

## 2021-03-25 DIAGNOSIS — M33.13 DERMATOMYOSITIS: Primary | ICD-10-CM

## 2021-03-25 DIAGNOSIS — L21.9 SEBORRHEIC DERMATITIS: ICD-10-CM

## 2021-03-25 PROCEDURE — 99999 PR PBB SHADOW E&M-EST. PATIENT-LVL III: CPT | Mod: PBBFAC,,, | Performed by: DERMATOLOGY

## 2021-03-25 PROCEDURE — 99999 PR PBB SHADOW E&M-EST. PATIENT-LVL III: ICD-10-PCS | Mod: PBBFAC,,, | Performed by: DERMATOLOGY

## 2021-03-25 PROCEDURE — 1126F AMNT PAIN NOTED NONE PRSNT: CPT | Mod: S$GLB,,, | Performed by: DERMATOLOGY

## 2021-03-25 PROCEDURE — 1126F PR PAIN SEVERITY QUANTIFIED, NO PAIN PRESENT: ICD-10-PCS | Mod: S$GLB,,, | Performed by: DERMATOLOGY

## 2021-03-25 PROCEDURE — 99214 OFFICE O/P EST MOD 30 MIN: CPT | Mod: S$GLB,,, | Performed by: DERMATOLOGY

## 2021-03-25 PROCEDURE — 99214 PR OFFICE/OUTPT VISIT, EST, LEVL IV, 30-39 MIN: ICD-10-PCS | Mod: S$GLB,,, | Performed by: DERMATOLOGY

## 2021-03-25 RX ORDER — KETOCONAZOLE 20 MG/G
CREAM TOPICAL 2 TIMES DAILY
Qty: 60 G | Refills: 5 | Status: SHIPPED | OUTPATIENT
Start: 2021-03-25 | End: 2021-09-30

## 2021-03-25 RX ORDER — TRIAMCINOLONE ACETONIDE 0.25 MG/G
CREAM TOPICAL
Qty: 80 G | Refills: 3 | Status: SHIPPED | OUTPATIENT
Start: 2021-03-25 | End: 2021-09-30

## 2021-03-25 RX ORDER — TRIAMCINOLONE ACETONIDE 1 MG/G
OINTMENT TOPICAL 2 TIMES DAILY
Qty: 454 G | Refills: 3 | Status: SHIPPED | OUTPATIENT
Start: 2021-03-25 | End: 2021-09-30

## 2021-03-29 ENCOUNTER — IMMUNIZATION (OUTPATIENT)
Dept: PRIMARY CARE CLINIC | Facility: CLINIC | Age: 61
End: 2021-03-29
Payer: COMMERCIAL

## 2021-03-29 DIAGNOSIS — Z23 NEED FOR VACCINATION: Primary | ICD-10-CM

## 2021-03-29 PROCEDURE — 0011A PR IMMUNIZ ADMIN, SARS-COV-2 COVID-19 VACC, 100MCG/0.5ML, 1ST DOSE: ICD-10-PCS | Mod: CV19,S$GLB,, | Performed by: INTERNAL MEDICINE

## 2021-03-29 PROCEDURE — 91301 PR SARS-COV-2 COVID-19 VACCINE, NO PRSV, 100MCG/0.5ML, IM: CPT | Mod: S$GLB,,, | Performed by: INTERNAL MEDICINE

## 2021-03-29 PROCEDURE — 0011A PR IMMUNIZ ADMIN, SARS-COV-2 COVID-19 VACC, 100MCG/0.5ML, 1ST DOSE: CPT | Mod: CV19,S$GLB,, | Performed by: INTERNAL MEDICINE

## 2021-03-29 PROCEDURE — 91301 PR SARS-COV-2 COVID-19 VACCINE, NO PRSV, 100MCG/0.5ML, IM: ICD-10-PCS | Mod: S$GLB,,, | Performed by: INTERNAL MEDICINE

## 2021-03-29 RX ADMIN — Medication 0.5 ML: at 11:03

## 2021-04-01 DIAGNOSIS — R59.1 LYMPHADENOPATHY: Primary | ICD-10-CM

## 2021-04-05 ENCOUNTER — HOSPITAL ENCOUNTER (OUTPATIENT)
Dept: INTERVENTIONAL RADIOLOGY/VASCULAR | Facility: HOSPITAL | Age: 61
Discharge: HOME | End: 2021-04-05
Attending: NURSE PRACTITIONER
Payer: COMMERCIAL

## 2021-04-05 VITALS
WEIGHT: 168 LBS | HEIGHT: 65 IN | TEMPERATURE: 98 F | DIASTOLIC BLOOD PRESSURE: 75 MMHG | BODY MASS INDEX: 27.99 KG/M2 | HEART RATE: 83 BPM | OXYGEN SATURATION: 100 % | SYSTOLIC BLOOD PRESSURE: 131 MMHG | RESPIRATION RATE: 20 BRPM

## 2021-04-05 DIAGNOSIS — R59.9 SWELLING OF LYMPH NODES: ICD-10-CM

## 2021-04-05 PROCEDURE — 99153 MOD SED SAME PHYS/QHP EA: CPT | Performed by: RADIOLOGY

## 2021-04-05 PROCEDURE — 38505 NEEDLE BIOPSY LYMPH NODES: CPT | Performed by: RADIOLOGY

## 2021-04-05 PROCEDURE — 76942 ECHO GUIDE FOR BIOPSY: CPT | Mod: TC | Performed by: RADIOLOGY

## 2021-04-05 PROCEDURE — 76942 PR U/S GUIDANCE FOR NEEDLE GUIDANCE: ICD-10-PCS | Mod: 26,,, | Performed by: RADIOLOGY

## 2021-04-05 PROCEDURE — 88189 PR  FLOWCYTOMETRY/READ, 16 & > MARKERS: ICD-10-PCS | Mod: ,,, | Performed by: PATHOLOGY

## 2021-04-05 PROCEDURE — 27201068 IR BIOPSY LYMPH NODE

## 2021-04-05 PROCEDURE — 63600175 PHARM REV CODE 636 W HCPCS: Performed by: RADIOLOGY

## 2021-04-05 PROCEDURE — 76942 ECHO GUIDE FOR BIOPSY: CPT | Mod: 26,,, | Performed by: RADIOLOGY

## 2021-04-05 PROCEDURE — 88185 FLOWCYTOMETRY/TC ADD-ON: CPT | Mod: 59 | Performed by: PATHOLOGY

## 2021-04-05 PROCEDURE — 99152 MOD SED SAME PHYS/QHP 5/>YRS: CPT | Performed by: RADIOLOGY

## 2021-04-05 PROCEDURE — 88184 FLOWCYTOMETRY/ TC 1 MARKER: CPT | Performed by: PATHOLOGY

## 2021-04-05 PROCEDURE — 38505 IR BIOPSY LYMPH NODE: ICD-10-PCS | Mod: ,,, | Performed by: RADIOLOGY

## 2021-04-05 PROCEDURE — 99152 MOD SED SAME PHYS/QHP 5/>YRS: CPT | Mod: ,,, | Performed by: RADIOLOGY

## 2021-04-05 PROCEDURE — 88189 FLOWCYTOMETRY/READ 16 & >: CPT | Mod: ,,, | Performed by: PATHOLOGY

## 2021-04-05 PROCEDURE — 99152 PR MOD CONSCIOUS SEDATION, SAME PHYS, 5+ YRS, FIRST 15 MIN: ICD-10-PCS | Mod: ,,, | Performed by: RADIOLOGY

## 2021-04-05 RX ORDER — MIDAZOLAM HYDROCHLORIDE 1 MG/ML
1 INJECTION INTRAMUSCULAR; INTRAVENOUS
Status: DISCONTINUED | OUTPATIENT
Start: 2021-04-05 | End: 2021-04-06 | Stop reason: HOSPADM

## 2021-04-05 RX ORDER — SODIUM CHLORIDE 9 MG/ML
INJECTION, SOLUTION INTRAVENOUS CONTINUOUS
Status: DISCONTINUED | OUTPATIENT
Start: 2021-04-05 | End: 2021-04-06 | Stop reason: HOSPADM

## 2021-04-05 RX ORDER — MIDAZOLAM HYDROCHLORIDE 1 MG/ML
INJECTION INTRAMUSCULAR; INTRAVENOUS CODE/TRAUMA/SEDATION MEDICATION
Status: DISCONTINUED | OUTPATIENT
Start: 2021-04-05 | End: 2021-04-06 | Stop reason: HOSPADM

## 2021-04-05 RX ORDER — FENTANYL CITRATE 50 UG/ML
50 INJECTION, SOLUTION INTRAMUSCULAR; INTRAVENOUS
Status: DISCONTINUED | OUTPATIENT
Start: 2021-04-05 | End: 2021-04-06 | Stop reason: HOSPADM

## 2021-04-05 RX ORDER — FENTANYL CITRATE 50 UG/ML
INJECTION, SOLUTION INTRAMUSCULAR; INTRAVENOUS CODE/TRAUMA/SEDATION MEDICATION
Status: DISCONTINUED | OUTPATIENT
Start: 2021-04-05 | End: 2021-04-06 | Stop reason: HOSPADM

## 2021-04-05 RX ADMIN — MIDAZOLAM HYDROCHLORIDE 1 MG: 1 INJECTION, SOLUTION INTRAMUSCULAR; INTRAVENOUS at 04:04

## 2021-04-05 RX ADMIN — MIDAZOLAM HYDROCHLORIDE 1 MG: 1 INJECTION, SOLUTION INTRAMUSCULAR; INTRAVENOUS at 03:04

## 2021-04-05 RX ADMIN — FENTANYL CITRATE 50 MCG: 50 INJECTION, SOLUTION INTRAMUSCULAR; INTRAVENOUS at 03:04

## 2021-04-05 RX ADMIN — FENTANYL CITRATE 50 MCG: 50 INJECTION, SOLUTION INTRAMUSCULAR; INTRAVENOUS at 04:04

## 2021-04-06 ENCOUNTER — INFUSION (OUTPATIENT)
Dept: INFECTIOUS DISEASES | Facility: HOSPITAL | Age: 61
End: 2021-04-06
Attending: INTERNAL MEDICINE
Payer: COMMERCIAL

## 2021-04-06 ENCOUNTER — PATIENT MESSAGE (OUTPATIENT)
Dept: PHARMACY | Facility: CLINIC | Age: 61
End: 2021-04-06

## 2021-04-06 VITALS
HEART RATE: 108 BPM | BODY MASS INDEX: 28.21 KG/M2 | RESPIRATION RATE: 18 BRPM | HEIGHT: 65 IN | TEMPERATURE: 98 F | OXYGEN SATURATION: 100 % | WEIGHT: 169.31 LBS | DIASTOLIC BLOOD PRESSURE: 80 MMHG | SYSTOLIC BLOOD PRESSURE: 144 MMHG

## 2021-04-06 DIAGNOSIS — R13.19 ESOPHAGEAL DYSPHAGIA: ICD-10-CM

## 2021-04-06 DIAGNOSIS — M33.13 DERMATOMYOSITIS: Primary | ICD-10-CM

## 2021-04-06 PROCEDURE — 96367 TX/PROPH/DG ADDL SEQ IV INF: CPT

## 2021-04-06 PROCEDURE — 96365 THER/PROPH/DIAG IV INF INIT: CPT

## 2021-04-06 PROCEDURE — 96375 TX/PRO/DX INJ NEW DRUG ADDON: CPT

## 2021-04-06 PROCEDURE — 63600175 PHARM REV CODE 636 W HCPCS: Mod: JG | Performed by: INTERNAL MEDICINE

## 2021-04-06 PROCEDURE — 96366 THER/PROPH/DIAG IV INF ADDON: CPT

## 2021-04-06 PROCEDURE — 25000003 PHARM REV CODE 250: Performed by: INTERNAL MEDICINE

## 2021-04-06 RX ORDER — SODIUM CHLORIDE 0.9 % (FLUSH) 0.9 %
10 SYRINGE (ML) INJECTION
Status: DISCONTINUED | OUTPATIENT
Start: 2021-04-06 | End: 2021-04-06 | Stop reason: HOSPADM

## 2021-04-06 RX ORDER — FAMOTIDINE 10 MG/ML
20 INJECTION INTRAVENOUS
Status: CANCELLED | OUTPATIENT
Start: 2021-04-27

## 2021-04-06 RX ORDER — HEPARIN 100 UNIT/ML
500 SYRINGE INTRAVENOUS
Status: CANCELLED | OUTPATIENT
Start: 2021-04-27

## 2021-04-06 RX ORDER — ACETAMINOPHEN 325 MG/1
650 TABLET ORAL
Status: CANCELLED | OUTPATIENT
Start: 2021-04-27

## 2021-04-06 RX ORDER — HEPARIN 100 UNIT/ML
500 SYRINGE INTRAVENOUS
Status: DISCONTINUED | OUTPATIENT
Start: 2021-04-06 | End: 2021-04-06 | Stop reason: HOSPADM

## 2021-04-06 RX ORDER — FAMOTIDINE 10 MG/ML
20 INJECTION INTRAVENOUS
Status: COMPLETED | OUTPATIENT
Start: 2021-04-06 | End: 2021-04-06

## 2021-04-06 RX ORDER — ACETAMINOPHEN 325 MG/1
650 TABLET ORAL
Status: COMPLETED | OUTPATIENT
Start: 2021-04-06 | End: 2021-04-06

## 2021-04-06 RX ORDER — SODIUM CHLORIDE 0.9 % (FLUSH) 0.9 %
10 SYRINGE (ML) INJECTION
Status: CANCELLED | OUTPATIENT
Start: 2021-04-27

## 2021-04-06 RX ADMIN — HUMAN IMMUNOGLOBULIN G 75 G: 5 LIQUID INTRAVENOUS at 09:04

## 2021-04-06 RX ADMIN — FAMOTIDINE 20 MG: 10 INJECTION INTRAVENOUS at 08:04

## 2021-04-06 RX ADMIN — ACETAMINOPHEN 650 MG: 325 TABLET ORAL at 08:04

## 2021-04-06 RX ADMIN — SODIUM CHLORIDE: 0.9 INJECTION, SOLUTION INTRAVENOUS at 08:04

## 2021-04-06 RX ADMIN — DIPHENHYDRAMINE HYDROCHLORIDE 50 MG: 50 INJECTION INTRAMUSCULAR; INTRAVENOUS at 08:04

## 2021-04-07 ENCOUNTER — INFUSION (OUTPATIENT)
Dept: INFECTIOUS DISEASES | Facility: HOSPITAL | Age: 61
End: 2021-04-07
Attending: INTERNAL MEDICINE
Payer: COMMERCIAL

## 2021-04-07 VITALS
HEART RATE: 100 BPM | DIASTOLIC BLOOD PRESSURE: 76 MMHG | TEMPERATURE: 98 F | SYSTOLIC BLOOD PRESSURE: 149 MMHG | RESPIRATION RATE: 18 BRPM | HEIGHT: 65 IN | WEIGHT: 169.06 LBS | OXYGEN SATURATION: 100 % | BODY MASS INDEX: 28.17 KG/M2

## 2021-04-07 DIAGNOSIS — M33.13 DERMATOMYOSITIS: Primary | ICD-10-CM

## 2021-04-07 DIAGNOSIS — R13.19 ESOPHAGEAL DYSPHAGIA: ICD-10-CM

## 2021-04-07 PROCEDURE — 96375 TX/PRO/DX INJ NEW DRUG ADDON: CPT

## 2021-04-07 PROCEDURE — 96367 TX/PROPH/DG ADDL SEQ IV INF: CPT

## 2021-04-07 PROCEDURE — 63600175 PHARM REV CODE 636 W HCPCS: Mod: JG | Performed by: INTERNAL MEDICINE

## 2021-04-07 PROCEDURE — 96366 THER/PROPH/DIAG IV INF ADDON: CPT

## 2021-04-07 PROCEDURE — 25000003 PHARM REV CODE 250: Performed by: INTERNAL MEDICINE

## 2021-04-07 PROCEDURE — 96365 THER/PROPH/DIAG IV INF INIT: CPT

## 2021-04-07 RX ORDER — FAMOTIDINE 10 MG/ML
20 INJECTION INTRAVENOUS
Status: COMPLETED | OUTPATIENT
Start: 2021-04-07 | End: 2021-04-07

## 2021-04-07 RX ORDER — FAMOTIDINE 10 MG/ML
20 INJECTION INTRAVENOUS
Status: CANCELLED | OUTPATIENT
Start: 2021-05-04

## 2021-04-07 RX ORDER — SODIUM CHLORIDE 0.9 % (FLUSH) 0.9 %
10 SYRINGE (ML) INJECTION
Status: CANCELLED | OUTPATIENT
Start: 2021-05-04

## 2021-04-07 RX ORDER — ACETAMINOPHEN 325 MG/1
650 TABLET ORAL
Status: COMPLETED | OUTPATIENT
Start: 2021-04-07 | End: 2021-04-07

## 2021-04-07 RX ORDER — HEPARIN 100 UNIT/ML
500 SYRINGE INTRAVENOUS
Status: CANCELLED | OUTPATIENT
Start: 2021-05-04

## 2021-04-07 RX ORDER — SODIUM CHLORIDE 0.9 % (FLUSH) 0.9 %
10 SYRINGE (ML) INJECTION
Status: DISCONTINUED | OUTPATIENT
Start: 2021-04-07 | End: 2021-04-07 | Stop reason: HOSPADM

## 2021-04-07 RX ORDER — HEPARIN 100 UNIT/ML
500 SYRINGE INTRAVENOUS
Status: DISCONTINUED | OUTPATIENT
Start: 2021-04-07 | End: 2021-04-07 | Stop reason: HOSPADM

## 2021-04-07 RX ORDER — ACETAMINOPHEN 325 MG/1
650 TABLET ORAL
Status: CANCELLED | OUTPATIENT
Start: 2021-05-04

## 2021-04-07 RX ADMIN — HUMAN IMMUNOGLOBULIN G 75 G: 5 LIQUID INTRAVENOUS at 09:04

## 2021-04-07 RX ADMIN — FAMOTIDINE 20 MG: 10 INJECTION INTRAVENOUS at 08:04

## 2021-04-07 RX ADMIN — SODIUM CHLORIDE: 0.9 INJECTION, SOLUTION INTRAVENOUS at 08:04

## 2021-04-07 RX ADMIN — DIPHENHYDRAMINE HYDROCHLORIDE 50 MG: 50 INJECTION INTRAMUSCULAR; INTRAVENOUS at 08:04

## 2021-04-07 RX ADMIN — ACETAMINOPHEN 650 MG: 325 TABLET ORAL at 08:04

## 2021-04-08 ENCOUNTER — PATIENT MESSAGE (OUTPATIENT)
Dept: PHARMACY | Facility: CLINIC | Age: 61
End: 2021-04-08

## 2021-04-08 LAB
FLOW CYTOMETRY ANTIBODIES ANALYZED - TISSUE: NORMAL
FLOW CYTOMETRY COMMENT - TISSUE: NORMAL
FLOW CYTOMETRY INTERPRETATION - TISSUE: NORMAL
SPECIMEN TYPE - TISSUE: NORMAL

## 2021-04-12 ENCOUNTER — SPECIALTY PHARMACY (OUTPATIENT)
Dept: PHARMACY | Facility: CLINIC | Age: 61
End: 2021-04-12

## 2021-04-12 LAB
ADEQUACY: NORMAL
COMMENT: NORMAL
FINAL PATHOLOGIC DIAGNOSIS: NORMAL
GROSS: NORMAL
Lab: NORMAL

## 2021-04-13 ENCOUNTER — TELEPHONE (OUTPATIENT)
Dept: HEMATOLOGY/ONCOLOGY | Facility: CLINIC | Age: 61
End: 2021-04-13

## 2021-04-13 DIAGNOSIS — M79.89 LEFT ARM SWELLING: Primary | ICD-10-CM

## 2021-04-22 ENCOUNTER — OFFICE VISIT (OUTPATIENT)
Dept: INTERNAL MEDICINE | Facility: CLINIC | Age: 61
End: 2021-04-22
Payer: COMMERCIAL

## 2021-04-22 VITALS
OXYGEN SATURATION: 99 % | HEIGHT: 65 IN | BODY MASS INDEX: 28.21 KG/M2 | DIASTOLIC BLOOD PRESSURE: 84 MMHG | SYSTOLIC BLOOD PRESSURE: 134 MMHG | WEIGHT: 169.31 LBS | HEART RATE: 101 BPM

## 2021-04-22 DIAGNOSIS — E06.3 AUTOIMMUNE THYROIDITIS: Primary | ICD-10-CM

## 2021-04-22 DIAGNOSIS — Q27.8 ABNORMALITY OF SYSTEMIC VEIN: ICD-10-CM

## 2021-04-22 DIAGNOSIS — R59.0 CERVICAL LYMPHADENOPATHY: ICD-10-CM

## 2021-04-22 DIAGNOSIS — Z17.1 MALIGNANT NEOPLASM OF UPPER-OUTER QUADRANT OF LEFT BREAST IN FEMALE, ESTROGEN RECEPTOR NEGATIVE: Chronic | ICD-10-CM

## 2021-04-22 DIAGNOSIS — R74.01 ELEVATED ALT MEASUREMENT: ICD-10-CM

## 2021-04-22 DIAGNOSIS — I65.23 CALCIFICATION OF BOTH CAROTID ARTERIES: ICD-10-CM

## 2021-04-22 DIAGNOSIS — M33.13 DERMATOMYOSITIS: ICD-10-CM

## 2021-04-22 DIAGNOSIS — C50.412 MALIGNANT NEOPLASM OF UPPER-OUTER QUADRANT OF LEFT BREAST IN FEMALE, ESTROGEN RECEPTOR NEGATIVE: Chronic | ICD-10-CM

## 2021-04-22 PROCEDURE — 3008F PR BODY MASS INDEX (BMI) DOCUMENTED: ICD-10-PCS | Mod: CPTII,S$GLB,, | Performed by: INTERNAL MEDICINE

## 2021-04-22 PROCEDURE — 1125F PR PAIN SEVERITY QUANTIFIED, PAIN PRESENT: ICD-10-PCS | Mod: S$GLB,,, | Performed by: INTERNAL MEDICINE

## 2021-04-22 PROCEDURE — 99214 PR OFFICE/OUTPT VISIT, EST, LEVL IV, 30-39 MIN: ICD-10-PCS | Mod: S$GLB,,, | Performed by: INTERNAL MEDICINE

## 2021-04-22 PROCEDURE — 3008F BODY MASS INDEX DOCD: CPT | Mod: CPTII,S$GLB,, | Performed by: INTERNAL MEDICINE

## 2021-04-22 PROCEDURE — 1125F AMNT PAIN NOTED PAIN PRSNT: CPT | Mod: S$GLB,,, | Performed by: INTERNAL MEDICINE

## 2021-04-22 PROCEDURE — 99999 PR PBB SHADOW E&M-EST. PATIENT-LVL V: ICD-10-PCS | Mod: PBBFAC,,, | Performed by: INTERNAL MEDICINE

## 2021-04-22 PROCEDURE — 99214 OFFICE O/P EST MOD 30 MIN: CPT | Mod: S$GLB,,, | Performed by: INTERNAL MEDICINE

## 2021-04-22 PROCEDURE — 99999 PR PBB SHADOW E&M-EST. PATIENT-LVL V: CPT | Mod: PBBFAC,,, | Performed by: INTERNAL MEDICINE

## 2021-04-28 ENCOUNTER — IMMUNIZATION (OUTPATIENT)
Dept: PRIMARY CARE CLINIC | Facility: CLINIC | Age: 61
End: 2021-04-28
Payer: COMMERCIAL

## 2021-04-28 ENCOUNTER — OFFICE VISIT (OUTPATIENT)
Dept: CARDIOLOGY | Facility: CLINIC | Age: 61
End: 2021-04-28
Payer: COMMERCIAL

## 2021-04-28 ENCOUNTER — HOSPITAL ENCOUNTER (OUTPATIENT)
Dept: RADIOLOGY | Facility: HOSPITAL | Age: 61
Discharge: HOME | End: 2021-04-28
Attending: INTERNAL MEDICINE
Payer: COMMERCIAL

## 2021-04-28 VITALS
HEART RATE: 101 BPM | HEIGHT: 65 IN | DIASTOLIC BLOOD PRESSURE: 80 MMHG | SYSTOLIC BLOOD PRESSURE: 132 MMHG | BODY MASS INDEX: 28.76 KG/M2 | WEIGHT: 172.63 LBS | OXYGEN SATURATION: 100 %

## 2021-04-28 DIAGNOSIS — Z23 NEED FOR VACCINATION: Primary | ICD-10-CM

## 2021-04-28 DIAGNOSIS — Z17.1 MALIGNANT NEOPLASM OF UPPER-OUTER QUADRANT OF LEFT BREAST IN FEMALE, ESTROGEN RECEPTOR NEGATIVE: ICD-10-CM

## 2021-04-28 DIAGNOSIS — I65.23 CALCIFICATION OF BOTH CAROTID ARTERIES: ICD-10-CM

## 2021-04-28 DIAGNOSIS — R09.89 ABNORMAL VASCULAR FLOW: Primary | ICD-10-CM

## 2021-04-28 DIAGNOSIS — T45.1X5A CHEMOTHERAPY-INDUCED NEUROPATHY: ICD-10-CM

## 2021-04-28 DIAGNOSIS — G62.0 CHEMOTHERAPY-INDUCED NEUROPATHY: ICD-10-CM

## 2021-04-28 DIAGNOSIS — Q27.8 ABNORMALITY OF SYSTEMIC VEIN: ICD-10-CM

## 2021-04-28 DIAGNOSIS — C50.412 MALIGNANT NEOPLASM OF UPPER-OUTER QUADRANT OF LEFT BREAST IN FEMALE, ESTROGEN RECEPTOR NEGATIVE: ICD-10-CM

## 2021-04-28 PROCEDURE — 99204 OFFICE O/P NEW MOD 45 MIN: CPT | Mod: S$GLB,,, | Performed by: INTERNAL MEDICINE

## 2021-04-28 PROCEDURE — 0012A PR IMMUNIZ ADMIN, SARS-COV-2 COVID-19 VACC, 100MCG/0.5ML, 2ND DOSE: ICD-10-PCS | Mod: CV19,S$GLB,, | Performed by: INTERNAL MEDICINE

## 2021-04-28 PROCEDURE — 93880 EXTRACRANIAL BILAT STUDY: CPT | Mod: TC

## 2021-04-28 PROCEDURE — 3008F BODY MASS INDEX DOCD: CPT | Mod: CPTII,S$GLB,, | Performed by: INTERNAL MEDICINE

## 2021-04-28 PROCEDURE — 99999 PR PBB SHADOW E&M-EST. PATIENT-LVL IV: CPT | Mod: PBBFAC,,, | Performed by: INTERNAL MEDICINE

## 2021-04-28 PROCEDURE — 91301 PR SARS-COV-2 COVID-19 VACCINE, NO PRSV, 100MCG/0.5ML, IM: ICD-10-PCS | Mod: S$GLB,,, | Performed by: INTERNAL MEDICINE

## 2021-04-28 PROCEDURE — 1126F AMNT PAIN NOTED NONE PRSNT: CPT | Mod: S$GLB,,, | Performed by: INTERNAL MEDICINE

## 2021-04-28 PROCEDURE — 1126F PR PAIN SEVERITY QUANTIFIED, NO PAIN PRESENT: ICD-10-PCS | Mod: S$GLB,,, | Performed by: INTERNAL MEDICINE

## 2021-04-28 PROCEDURE — 91301 PR SARS-COV-2 COVID-19 VACCINE, NO PRSV, 100MCG/0.5ML, IM: CPT | Mod: S$GLB,,, | Performed by: INTERNAL MEDICINE

## 2021-04-28 PROCEDURE — 93880 EXTRACRANIAL BILAT STUDY: CPT | Mod: 26,,, | Performed by: RADIOLOGY

## 2021-04-28 PROCEDURE — 99999 PR PBB SHADOW E&M-EST. PATIENT-LVL IV: ICD-10-PCS | Mod: PBBFAC,,, | Performed by: INTERNAL MEDICINE

## 2021-04-28 PROCEDURE — 0012A PR IMMUNIZ ADMIN, SARS-COV-2 COVID-19 VACC, 100MCG/0.5ML, 2ND DOSE: CPT | Mod: CV19,S$GLB,, | Performed by: INTERNAL MEDICINE

## 2021-04-28 PROCEDURE — 93880 US CAROTID BILATERAL: ICD-10-PCS | Mod: 26,,, | Performed by: RADIOLOGY

## 2021-04-28 PROCEDURE — 3008F PR BODY MASS INDEX (BMI) DOCUMENTED: ICD-10-PCS | Mod: CPTII,S$GLB,, | Performed by: INTERNAL MEDICINE

## 2021-04-28 PROCEDURE — 99204 PR OFFICE/OUTPT VISIT, NEW, LEVL IV, 45-59 MIN: ICD-10-PCS | Mod: S$GLB,,, | Performed by: INTERNAL MEDICINE

## 2021-04-28 RX ADMIN — Medication 0.5 ML: at 11:04

## 2021-04-29 ENCOUNTER — OFFICE VISIT (OUTPATIENT)
Dept: DERMATOLOGY | Facility: CLINIC | Age: 61
End: 2021-04-29
Payer: COMMERCIAL

## 2021-04-29 ENCOUNTER — TELEPHONE (OUTPATIENT)
Dept: INTERNAL MEDICINE | Facility: CLINIC | Age: 61
End: 2021-04-29

## 2021-04-29 DIAGNOSIS — I65.23 BILATERAL CAROTID ARTERY STENOSIS: Primary | ICD-10-CM

## 2021-04-29 DIAGNOSIS — R21 FACIAL RASH: ICD-10-CM

## 2021-04-29 DIAGNOSIS — M33.13 DERMATOMYOSITIS: Primary | ICD-10-CM

## 2021-04-29 PROCEDURE — 99999 PR PBB SHADOW E&M-EST. PATIENT-LVL III: CPT | Mod: PBBFAC,,, | Performed by: DERMATOLOGY

## 2021-04-29 PROCEDURE — 99214 OFFICE O/P EST MOD 30 MIN: CPT | Mod: S$GLB,,, | Performed by: DERMATOLOGY

## 2021-04-29 PROCEDURE — 1126F PR PAIN SEVERITY QUANTIFIED, NO PAIN PRESENT: ICD-10-PCS | Mod: S$GLB,,, | Performed by: DERMATOLOGY

## 2021-04-29 PROCEDURE — 99999 PR PBB SHADOW E&M-EST. PATIENT-LVL III: ICD-10-PCS | Mod: PBBFAC,,, | Performed by: DERMATOLOGY

## 2021-04-29 PROCEDURE — 99214 PR OFFICE/OUTPT VISIT, EST, LEVL IV, 30-39 MIN: ICD-10-PCS | Mod: S$GLB,,, | Performed by: DERMATOLOGY

## 2021-04-29 PROCEDURE — 1126F AMNT PAIN NOTED NONE PRSNT: CPT | Mod: S$GLB,,, | Performed by: DERMATOLOGY

## 2021-04-29 RX ORDER — KETOCONAZOLE 20 MG/ML
SHAMPOO, SUSPENSION TOPICAL
Qty: 120 ML | Refills: 5 | Status: SHIPPED | OUTPATIENT
Start: 2021-04-29 | End: 2023-08-27

## 2021-04-29 RX ORDER — PIMECROLIMUS 10 MG/G
CREAM TOPICAL 2 TIMES DAILY PRN
Qty: 30 G | Refills: 3 | Status: SHIPPED | OUTPATIENT
Start: 2021-04-29 | End: 2021-09-30

## 2021-04-29 RX ORDER — BETAMETHASONE DIPROPIONATE 0.5 MG/G
CREAM TOPICAL 2 TIMES DAILY PRN
Qty: 60 G | Refills: 3 | Status: SHIPPED | OUTPATIENT
Start: 2021-04-29 | End: 2021-09-30

## 2021-04-29 RX ORDER — SULFACETAMIDE SODIUM, SULFUR 100; 50 MG/G; MG/G
EMULSION TOPICAL
Qty: 227 G | Refills: 5 | Status: SHIPPED | OUTPATIENT
Start: 2021-04-29 | End: 2022-05-17

## 2021-04-30 ENCOUNTER — PATIENT MESSAGE (OUTPATIENT)
Dept: INTERNAL MEDICINE | Facility: CLINIC | Age: 61
End: 2021-04-30

## 2021-04-30 ENCOUNTER — HOSPITAL ENCOUNTER (OUTPATIENT)
Dept: RADIOLOGY | Facility: HOSPITAL | Age: 61
Discharge: HOME | End: 2021-04-30
Attending: INTERNAL MEDICINE
Payer: COMMERCIAL

## 2021-04-30 DIAGNOSIS — Z17.1 MALIGNANT NEOPLASM OF UPPER-OUTER QUADRANT OF LEFT BREAST IN FEMALE, ESTROGEN RECEPTOR NEGATIVE: ICD-10-CM

## 2021-04-30 DIAGNOSIS — C50.412 MALIGNANT NEOPLASM OF UPPER-OUTER QUADRANT OF LEFT BREAST IN FEMALE, ESTROGEN RECEPTOR NEGATIVE: Chronic | ICD-10-CM

## 2021-04-30 DIAGNOSIS — C50.412 MALIGNANT NEOPLASM OF UPPER-OUTER QUADRANT OF LEFT BREAST IN FEMALE, ESTROGEN RECEPTOR NEGATIVE: ICD-10-CM

## 2021-04-30 DIAGNOSIS — Z17.1 MALIGNANT NEOPLASM OF UPPER-OUTER QUADRANT OF LEFT BREAST IN FEMALE, ESTROGEN RECEPTOR NEGATIVE: Chronic | ICD-10-CM

## 2021-04-30 PROCEDURE — 71260 CT CHEST ABDOMEN PELVIS WITH CONTRAST (XPD): ICD-10-PCS | Mod: 26,,, | Performed by: INTERNAL MEDICINE

## 2021-04-30 PROCEDURE — 74177 CT ABD & PELVIS W/CONTRAST: CPT | Mod: TC

## 2021-04-30 PROCEDURE — 71260 CT THORAX DX C+: CPT | Mod: TC

## 2021-04-30 PROCEDURE — 71260 CT THORAX DX C+: CPT | Mod: 26,,, | Performed by: INTERNAL MEDICINE

## 2021-04-30 PROCEDURE — 25500020 PHARM REV CODE 255: Performed by: INTERNAL MEDICINE

## 2021-04-30 PROCEDURE — 74177 CT CHEST ABDOMEN PELVIS WITH CONTRAST (XPD): ICD-10-PCS | Mod: 26,,, | Performed by: INTERNAL MEDICINE

## 2021-04-30 PROCEDURE — 74177 CT ABD & PELVIS W/CONTRAST: CPT | Mod: 26,,, | Performed by: INTERNAL MEDICINE

## 2021-04-30 RX ADMIN — IOHEXOL 15 ML: 350 INJECTION, SOLUTION INTRAVENOUS at 10:04

## 2021-04-30 RX ADMIN — IOHEXOL 75 ML: 350 INJECTION, SOLUTION INTRAVENOUS at 11:04

## 2021-05-02 PROBLEM — Z17.1 MALIGNANT NEOPLASM OF UPPER-OUTER QUADRANT OF LEFT BREAST IN FEMALE, ESTROGEN RECEPTOR NEGATIVE: Status: ACTIVE | Noted: 2017-02-09

## 2021-05-03 ENCOUNTER — OFFICE VISIT (OUTPATIENT)
Dept: HEMATOLOGY/ONCOLOGY | Facility: CLINIC | Age: 61
End: 2021-05-03
Payer: COMMERCIAL

## 2021-05-03 VITALS
BODY MASS INDEX: 28.83 KG/M2 | DIASTOLIC BLOOD PRESSURE: 76 MMHG | SYSTOLIC BLOOD PRESSURE: 139 MMHG | HEIGHT: 65 IN | RESPIRATION RATE: 18 BRPM | HEART RATE: 104 BPM | WEIGHT: 173.06 LBS | TEMPERATURE: 98 F | OXYGEN SATURATION: 99 %

## 2021-05-03 DIAGNOSIS — Z17.1 MALIGNANT NEOPLASM OF UPPER-OUTER QUADRANT OF LEFT BREAST IN FEMALE, ESTROGEN RECEPTOR NEGATIVE: Primary | Chronic | ICD-10-CM

## 2021-05-03 DIAGNOSIS — M33.20 POLYMYOSITIS ASSOCIATED WITH AUTOIMMUNE DISEASE: ICD-10-CM

## 2021-05-03 DIAGNOSIS — C50.412 MALIGNANT NEOPLASM OF UPPER-OUTER QUADRANT OF LEFT BREAST IN FEMALE, ESTROGEN RECEPTOR NEGATIVE: Primary | Chronic | ICD-10-CM

## 2021-05-03 DIAGNOSIS — M35.9 POLYMYOSITIS ASSOCIATED WITH AUTOIMMUNE DISEASE: ICD-10-CM

## 2021-05-03 PROCEDURE — 3008F PR BODY MASS INDEX (BMI) DOCUMENTED: ICD-10-PCS | Mod: CPTII,S$GLB,, | Performed by: INTERNAL MEDICINE

## 2021-05-03 PROCEDURE — 1126F AMNT PAIN NOTED NONE PRSNT: CPT | Mod: S$GLB,,, | Performed by: INTERNAL MEDICINE

## 2021-05-03 PROCEDURE — 3008F BODY MASS INDEX DOCD: CPT | Mod: CPTII,S$GLB,, | Performed by: INTERNAL MEDICINE

## 2021-05-03 PROCEDURE — 99999 PR PBB SHADOW E&M-EST. PATIENT-LVL IV: ICD-10-PCS | Mod: PBBFAC,,, | Performed by: INTERNAL MEDICINE

## 2021-05-03 PROCEDURE — 1126F PR PAIN SEVERITY QUANTIFIED, NO PAIN PRESENT: ICD-10-PCS | Mod: S$GLB,,, | Performed by: INTERNAL MEDICINE

## 2021-05-03 PROCEDURE — 99999 PR PBB SHADOW E&M-EST. PATIENT-LVL IV: CPT | Mod: PBBFAC,,, | Performed by: INTERNAL MEDICINE

## 2021-05-03 PROCEDURE — 99214 PR OFFICE/OUTPT VISIT, EST, LEVL IV, 30-39 MIN: ICD-10-PCS | Mod: S$GLB,,, | Performed by: INTERNAL MEDICINE

## 2021-05-03 PROCEDURE — 99214 OFFICE O/P EST MOD 30 MIN: CPT | Mod: S$GLB,,, | Performed by: INTERNAL MEDICINE

## 2021-05-04 ENCOUNTER — OFFICE VISIT (OUTPATIENT)
Dept: RHEUMATOLOGY | Facility: CLINIC | Age: 61
End: 2021-05-04
Payer: COMMERCIAL

## 2021-05-04 ENCOUNTER — INFUSION (OUTPATIENT)
Dept: INFECTIOUS DISEASES | Facility: HOSPITAL | Age: 61
End: 2021-05-04
Attending: INTERNAL MEDICINE
Payer: COMMERCIAL

## 2021-05-04 ENCOUNTER — PATIENT MESSAGE (OUTPATIENT)
Dept: HEMATOLOGY/ONCOLOGY | Facility: CLINIC | Age: 61
End: 2021-05-04

## 2021-05-04 VITALS
RESPIRATION RATE: 18 BRPM | DIASTOLIC BLOOD PRESSURE: 71 MMHG | HEIGHT: 65 IN | HEART RATE: 96 BPM | SYSTOLIC BLOOD PRESSURE: 143 MMHG | BODY MASS INDEX: 28.76 KG/M2 | TEMPERATURE: 98 F | WEIGHT: 172.63 LBS | OXYGEN SATURATION: 100 %

## 2021-05-04 VITALS
WEIGHT: 175.94 LBS | SYSTOLIC BLOOD PRESSURE: 120 MMHG | BODY MASS INDEX: 29.31 KG/M2 | DIASTOLIC BLOOD PRESSURE: 72 MMHG | HEIGHT: 65 IN | HEART RATE: 92 BPM

## 2021-05-04 DIAGNOSIS — M33.13 DERMATOMYOSITIS: Primary | ICD-10-CM

## 2021-05-04 DIAGNOSIS — M33.20 POLYMYOSITIS ASSOCIATED WITH AUTOIMMUNE DISEASE: ICD-10-CM

## 2021-05-04 DIAGNOSIS — Z17.1 MALIGNANT NEOPLASM OF UPPER-OUTER QUADRANT OF LEFT BREAST IN FEMALE, ESTROGEN RECEPTOR NEGATIVE: Primary | ICD-10-CM

## 2021-05-04 DIAGNOSIS — M35.9 POLYMYOSITIS ASSOCIATED WITH AUTOIMMUNE DISEASE: ICD-10-CM

## 2021-05-04 DIAGNOSIS — R13.19 ESOPHAGEAL DYSPHAGIA: ICD-10-CM

## 2021-05-04 DIAGNOSIS — C50.412 MALIGNANT NEOPLASM OF UPPER-OUTER QUADRANT OF LEFT BREAST IN FEMALE, ESTROGEN RECEPTOR NEGATIVE: Primary | ICD-10-CM

## 2021-05-04 PROCEDURE — 3008F PR BODY MASS INDEX (BMI) DOCUMENTED: ICD-10-PCS | Mod: CPTII,S$GLB,, | Performed by: INTERNAL MEDICINE

## 2021-05-04 PROCEDURE — 99214 OFFICE O/P EST MOD 30 MIN: CPT | Mod: S$GLB,,, | Performed by: INTERNAL MEDICINE

## 2021-05-04 PROCEDURE — 99999 PR PBB SHADOW E&M-EST. PATIENT-LVL IV: CPT | Mod: PBBFAC,,, | Performed by: INTERNAL MEDICINE

## 2021-05-04 PROCEDURE — 63600175 PHARM REV CODE 636 W HCPCS: Mod: JG | Performed by: INTERNAL MEDICINE

## 2021-05-04 PROCEDURE — 96366 THER/PROPH/DIAG IV INF ADDON: CPT

## 2021-05-04 PROCEDURE — 3008F BODY MASS INDEX DOCD: CPT | Mod: CPTII,S$GLB,, | Performed by: INTERNAL MEDICINE

## 2021-05-04 PROCEDURE — 96367 TX/PROPH/DG ADDL SEQ IV INF: CPT

## 2021-05-04 PROCEDURE — 1125F PR PAIN SEVERITY QUANTIFIED, PAIN PRESENT: ICD-10-PCS | Mod: S$GLB,,, | Performed by: INTERNAL MEDICINE

## 2021-05-04 PROCEDURE — 96375 TX/PRO/DX INJ NEW DRUG ADDON: CPT

## 2021-05-04 PROCEDURE — 25000003 PHARM REV CODE 250: Performed by: INTERNAL MEDICINE

## 2021-05-04 PROCEDURE — 99214 PR OFFICE/OUTPT VISIT, EST, LEVL IV, 30-39 MIN: ICD-10-PCS | Mod: S$GLB,,, | Performed by: INTERNAL MEDICINE

## 2021-05-04 PROCEDURE — 99999 PR PBB SHADOW E&M-EST. PATIENT-LVL IV: ICD-10-PCS | Mod: PBBFAC,,, | Performed by: INTERNAL MEDICINE

## 2021-05-04 PROCEDURE — 1125F AMNT PAIN NOTED PAIN PRSNT: CPT | Mod: S$GLB,,, | Performed by: INTERNAL MEDICINE

## 2021-05-04 PROCEDURE — 96365 THER/PROPH/DIAG IV INF INIT: CPT

## 2021-05-04 RX ORDER — HEPARIN 100 UNIT/ML
500 SYRINGE INTRAVENOUS
Status: CANCELLED | OUTPATIENT
Start: 2021-06-01

## 2021-05-04 RX ORDER — FAMOTIDINE 10 MG/ML
20 INJECTION INTRAVENOUS
Status: COMPLETED | OUTPATIENT
Start: 2021-05-04 | End: 2021-05-04

## 2021-05-04 RX ORDER — FAMOTIDINE 10 MG/ML
20 INJECTION INTRAVENOUS
Status: CANCELLED | OUTPATIENT
Start: 2021-06-01

## 2021-05-04 RX ORDER — ACETAMINOPHEN 325 MG/1
650 TABLET ORAL
Status: CANCELLED | OUTPATIENT
Start: 2021-06-01

## 2021-05-04 RX ORDER — ASPIRIN 81 MG/1
81 TABLET ORAL DAILY
COMMUNITY
End: 2021-12-16

## 2021-05-04 RX ORDER — SODIUM CHLORIDE 0.9 % (FLUSH) 0.9 %
10 SYRINGE (ML) INJECTION
Status: DISCONTINUED | OUTPATIENT
Start: 2021-05-04 | End: 2021-05-04 | Stop reason: HOSPADM

## 2021-05-04 RX ORDER — SODIUM CHLORIDE 0.9 % (FLUSH) 0.9 %
10 SYRINGE (ML) INJECTION
Status: CANCELLED | OUTPATIENT
Start: 2021-06-01

## 2021-05-04 RX ORDER — HEPARIN 100 UNIT/ML
500 SYRINGE INTRAVENOUS
Status: DISCONTINUED | OUTPATIENT
Start: 2021-05-04 | End: 2021-05-04 | Stop reason: HOSPADM

## 2021-05-04 RX ORDER — ACETAMINOPHEN 325 MG/1
650 TABLET ORAL
Status: COMPLETED | OUTPATIENT
Start: 2021-05-04 | End: 2021-05-04

## 2021-05-04 RX ADMIN — SODIUM CHLORIDE: 0.9 INJECTION, SOLUTION INTRAVENOUS at 09:05

## 2021-05-04 RX ADMIN — ACETAMINOPHEN 650 MG: 325 TABLET ORAL at 09:05

## 2021-05-04 RX ADMIN — FAMOTIDINE 20 MG: 10 INJECTION INTRAVENOUS at 09:05

## 2021-05-04 RX ADMIN — DIPHENHYDRAMINE HYDROCHLORIDE 50 MG: 50 INJECTION INTRAMUSCULAR; INTRAVENOUS at 09:05

## 2021-05-04 RX ADMIN — HUMAN IMMUNOGLOBULIN G 80 G: 40 LIQUID INTRAVENOUS at 10:05

## 2021-05-05 ENCOUNTER — INFUSION (OUTPATIENT)
Dept: INFECTIOUS DISEASES | Facility: HOSPITAL | Age: 61
End: 2021-05-05
Attending: INTERNAL MEDICINE
Payer: COMMERCIAL

## 2021-05-05 ENCOUNTER — TELEPHONE (OUTPATIENT)
Dept: PHARMACY | Facility: CLINIC | Age: 61
End: 2021-05-05

## 2021-05-05 VITALS
TEMPERATURE: 98 F | OXYGEN SATURATION: 99 % | DIASTOLIC BLOOD PRESSURE: 86 MMHG | BODY MASS INDEX: 28.43 KG/M2 | SYSTOLIC BLOOD PRESSURE: 147 MMHG | HEIGHT: 65 IN | WEIGHT: 170.63 LBS | RESPIRATION RATE: 19 BRPM | HEART RATE: 110 BPM

## 2021-05-05 DIAGNOSIS — R13.19 ESOPHAGEAL DYSPHAGIA: ICD-10-CM

## 2021-05-05 DIAGNOSIS — M33.13 DERMATOMYOSITIS: Primary | ICD-10-CM

## 2021-05-05 PROCEDURE — 96375 TX/PRO/DX INJ NEW DRUG ADDON: CPT

## 2021-05-05 PROCEDURE — 96367 TX/PROPH/DG ADDL SEQ IV INF: CPT

## 2021-05-05 PROCEDURE — 96366 THER/PROPH/DIAG IV INF ADDON: CPT

## 2021-05-05 PROCEDURE — 25000003 PHARM REV CODE 250: Performed by: INTERNAL MEDICINE

## 2021-05-05 PROCEDURE — 63600175 PHARM REV CODE 636 W HCPCS: Performed by: INTERNAL MEDICINE

## 2021-05-05 PROCEDURE — 96365 THER/PROPH/DIAG IV INF INIT: CPT

## 2021-05-05 RX ORDER — FAMOTIDINE 10 MG/ML
20 INJECTION INTRAVENOUS
Status: COMPLETED | OUTPATIENT
Start: 2021-05-05 | End: 2021-05-05

## 2021-05-05 RX ORDER — SODIUM CHLORIDE 0.9 % (FLUSH) 0.9 %
10 SYRINGE (ML) INJECTION
Status: DISCONTINUED | OUTPATIENT
Start: 2021-05-05 | End: 2021-05-05 | Stop reason: HOSPADM

## 2021-05-05 RX ORDER — ACETAMINOPHEN 325 MG/1
650 TABLET ORAL
Status: COMPLETED | OUTPATIENT
Start: 2021-05-05 | End: 2021-05-05

## 2021-05-05 RX ORDER — ACETAMINOPHEN 325 MG/1
650 TABLET ORAL
Status: CANCELLED | OUTPATIENT
Start: 2021-06-02

## 2021-05-05 RX ORDER — SODIUM CHLORIDE 0.9 % (FLUSH) 0.9 %
10 SYRINGE (ML) INJECTION
Status: CANCELLED | OUTPATIENT
Start: 2021-06-02

## 2021-05-05 RX ORDER — HEPARIN 100 UNIT/ML
500 SYRINGE INTRAVENOUS
Status: CANCELLED | OUTPATIENT
Start: 2021-06-02

## 2021-05-05 RX ORDER — FAMOTIDINE 10 MG/ML
20 INJECTION INTRAVENOUS
Status: CANCELLED | OUTPATIENT
Start: 2021-06-02

## 2021-05-05 RX ADMIN — ACETAMINOPHEN 650 MG: 325 TABLET ORAL at 09:05

## 2021-05-05 RX ADMIN — HUMAN IMMUNOGLOBULIN G 75 G: 5 LIQUID INTRAVENOUS at 09:05

## 2021-05-05 RX ADMIN — SODIUM CHLORIDE: 0.9 INJECTION, SOLUTION INTRAVENOUS at 09:05

## 2021-05-05 RX ADMIN — DIPHENHYDRAMINE HYDROCHLORIDE 50 MG: 50 INJECTION INTRAMUSCULAR; INTRAVENOUS at 09:05

## 2021-05-05 RX ADMIN — FAMOTIDINE 20 MG: 10 INJECTION INTRAVENOUS at 09:05

## 2021-05-06 ENCOUNTER — TELEPHONE (OUTPATIENT)
Dept: DERMATOLOGY | Facility: CLINIC | Age: 61
End: 2021-05-06

## 2021-05-07 ENCOUNTER — TELEPHONE (OUTPATIENT)
Dept: INTERVENTIONAL RADIOLOGY/VASCULAR | Facility: HOSPITAL | Age: 61
End: 2021-05-07

## 2021-05-10 ENCOUNTER — OFFICE VISIT (OUTPATIENT)
Dept: DERMATOLOGY | Facility: CLINIC | Age: 61
End: 2021-05-10
Payer: COMMERCIAL

## 2021-05-10 ENCOUNTER — TELEPHONE (OUTPATIENT)
Dept: INTERNAL MEDICINE | Facility: CLINIC | Age: 61
End: 2021-05-10

## 2021-05-10 DIAGNOSIS — L94.4 GOTTRON'S PAPULES: Primary | ICD-10-CM

## 2021-05-10 PROCEDURE — 1126F PR PAIN SEVERITY QUANTIFIED, NO PAIN PRESENT: ICD-10-PCS | Mod: S$GLB,,, | Performed by: DERMATOLOGY

## 2021-05-10 PROCEDURE — 11901 INJECT SKIN LESIONS >7: CPT | Mod: S$GLB,,, | Performed by: DERMATOLOGY

## 2021-05-10 PROCEDURE — 99999 PR PBB SHADOW E&M-EST. PATIENT-LVL II: CPT | Mod: PBBFAC,,, | Performed by: DERMATOLOGY

## 2021-05-10 PROCEDURE — 11901 PR INJECTION, SKIN LESIONS, 8 OR MORE: ICD-10-PCS | Mod: S$GLB,,, | Performed by: DERMATOLOGY

## 2021-05-10 PROCEDURE — 1126F AMNT PAIN NOTED NONE PRSNT: CPT | Mod: S$GLB,,, | Performed by: DERMATOLOGY

## 2021-05-10 PROCEDURE — 99999 PR PBB SHADOW E&M-EST. PATIENT-LVL II: ICD-10-PCS | Mod: PBBFAC,,, | Performed by: DERMATOLOGY

## 2021-05-10 PROCEDURE — 99499 UNLISTED E&M SERVICE: CPT | Mod: S$GLB,,, | Performed by: DERMATOLOGY

## 2021-05-10 PROCEDURE — 99499 NO LOS: ICD-10-PCS | Mod: S$GLB,,, | Performed by: DERMATOLOGY

## 2021-05-10 RX ORDER — NIFEDIPINE 10 MG/1
10 CAPSULE ORAL 2 TIMES DAILY
Qty: 60 CAPSULE | Refills: 3 | Status: SHIPPED | OUTPATIENT
Start: 2021-05-10 | End: 2021-09-30

## 2021-05-11 ENCOUNTER — HOSPITAL ENCOUNTER (OUTPATIENT)
Dept: INTERVENTIONAL RADIOLOGY/VASCULAR | Facility: HOSPITAL | Age: 61
Discharge: HOME | End: 2021-05-11
Attending: INTERNAL MEDICINE
Payer: COMMERCIAL

## 2021-05-11 DIAGNOSIS — Z17.1 MALIGNANT NEOPLASM OF UPPER-OUTER QUADRANT OF LEFT BREAST IN FEMALE, ESTROGEN RECEPTOR NEGATIVE: ICD-10-CM

## 2021-05-11 DIAGNOSIS — M33.20 POLYMYOSITIS ASSOCIATED WITH AUTOIMMUNE DISEASE: ICD-10-CM

## 2021-05-11 DIAGNOSIS — M35.9 POLYMYOSITIS ASSOCIATED WITH AUTOIMMUNE DISEASE: ICD-10-CM

## 2021-05-11 DIAGNOSIS — C50.412 MALIGNANT NEOPLASM OF UPPER-OUTER QUADRANT OF LEFT BREAST IN FEMALE, ESTROGEN RECEPTOR NEGATIVE: ICD-10-CM

## 2021-05-11 PROCEDURE — 36598 IR CATHETER DECLOT THROMBOLYTIC: ICD-10-PCS | Mod: LT,,, | Performed by: RADIOLOGY

## 2021-05-11 PROCEDURE — 36598 INJ W/FLUOR EVAL CV DEVICE: CPT | Performed by: RADIOLOGY

## 2021-05-11 PROCEDURE — 25500020 PHARM REV CODE 255: Performed by: INTERNAL MEDICINE

## 2021-05-11 RX ADMIN — IOHEXOL 3 ML: 300 INJECTION, SOLUTION INTRAVENOUS at 09:05

## 2021-05-13 NOTE — SUBJECTIVE & OBJECTIVE
Progress Note      Patient Name: Julius Gonzales Jr.   Patient ID: [id number]   Sex: Male   YOB: 1979    Primary Care Provider: Carol FREEMAN   Referring Provider: Carol FREEMAN    Visit Date: July 21, 2020    Provider: LYDIA Rollins   Location: [facility]   Location Address: [address] [place] [address], [address]  [place], [place]  [id number]   Location Phone: [phone]          Chief Complaint  · 3 month f/u  · Hypertension  · Hyperlipidemia      History Of Present Illness  Video Conferencing Visit  Julius Gonzales Jr. is a 41 year old /White male who is presenting for evaluation via video conferencing via [employer]. Verbal consent obtained before beginning visit.   The following staff were present during this visit: Maria Victoria Funes   Julius Gonzales Jr. is a 41 year old /White male who presents for evaluation and treatment of:      the patient presents on facetOmada today and he has h/o hypertension and hyperlipidemia he states his BP is 140/80 he is still on Lipitor and states he is tolerating this ok he has recently been started on Pristiq he was having some anorgasmia and so Wellbutrin was initiated unfortunately the Wellbutrin did not help so the patient began taking the Pristiq QOD and he feels good and he anorgasmia is improved       Past Medical History  Disease Name Date Onset Notes   Hyperlipidemia --  --    Hypertension --  --          Past Surgical History  Procedure Name Date Notes   Hernia 2001 --          Medication List  Name Date Started Instructions   atorvastatin 10 mg oral tablet 04/14/2020 take 1 tablet (10 mg) by oral route once daily at bedtime for 90 days   lorazepam 2 mg oral tablet 03/10/2020 take 1 tablet (2 mg) by oral route 2 times per day as needed for 30 days   Norvasc 2.5 mg oral tablet 03/03/2020 take 1 tablet by oral route 2 times a day for 90 days   Pristiq 50 mg oral tablet extended release 24 hr 03/09/2020 take 1 tablet (50 mg) by oral  "Interval History: NAEON. Patient has no acute complaints. Feeling stronger than day prior but still experiencing some weakness in proximal upper extremities, described as "heaviness"      Oncology Treatment Plan:   OP BREAST DOCETAXEL Q3W    Medications:  Continuous Infusions:   sodium chloride 0.9% 125 mL/hr at 01/24/19 1052     Scheduled Meds:   amLODIPine  10 mg Oral Daily    calcium carbonate  500 mg Oral Daily    cetirizine  10 mg Oral Daily    cyanocobalamin  500 mcg Oral Daily    enoxaparin  40 mg Subcutaneous Daily    escitalopram oxalate  10 mg Oral Daily    famotidine  20 mg Oral BID    fluticasone  1 spray Each Nare Daily    methylPREDNISolone sodium succinate  125 mg Intravenous BID    nystatin  500,000 Units Oral QID    potassium, sodium phosphates  2 packet Oral Q4H    vitamin D  1,000 Units Oral Daily     PRN Meds:acetaminophen, ALPRAZolam, dextrose 50%, dextrose 50%, glucagon (human recombinant), glucose, glucose, insulin aspart U-100, ondansetron, oxyCODONE, polyethylene glycol, promethazine, sodium chloride 0.9%     Review of Systems   Constitutional: Positive for fatigue. Negative for activity change, appetite change, chills, diaphoresis, fever and unexpected weight change.   HENT: Positive for facial swelling. Negative for congestion, ear discharge, hearing loss, postnasal drip, sinus pressure, sneezing, sore throat and tinnitus.    Eyes: Negative for photophobia, pain and redness.   Respiratory: Negative for apnea, cough, choking, chest tightness, shortness of breath and stridor.    Cardiovascular: Negative for chest pain, palpitations and leg swelling.   Gastrointestinal: Negative for abdominal pain, anal bleeding, constipation, diarrhea, nausea and rectal pain.   Endocrine: Negative for polyuria.   Genitourinary: Negative for dysuria and hematuria.   Musculoskeletal: Positive for myalgias. Negative for arthralgias, back pain, gait problem, joint swelling, neck pain and neck " route once daily in am   propranolol 60 mg oral capsule,extended release 24 hr 03/03/2020 take 1 capsule (60 mg) by oral route once daily qhs   sildenafil 50 mg oral tablet 03/03/2020 take 1 tablet (50 mg) by oral route once daily as needed approximately 1 hour before sexual activity for 30 days   Wellbutrin  mg oral tablet sustained-release 12 hr 04/14/2020 take 1 tablet (150 mg) by oral route once daily 1-2 hours prior to sexual activity         Allergy List  Allergen Name Date Reaction Notes   NO KNOWN DRUG ALLERGIES --  --  --    Diovan HCT --  Swelling --    NSAIDS --  --  angioedema         Family Medical History  Disease Name Relative/Age Notes   Hyperlipidemia Father/   --    Hypertension Father/   --    Heart Attack (MI) Grandfather (maternal)/  Grandmother (maternal)/  Grandmother (paternal)/  Uncle/   --          Social History  Finding Status Start/Stop Quantity Notes   Alcohol Current some day --/-- occasionally --     --  --/-- --  --    No known infection risk --  --/-- --  --    Smokeless tobacco Current every day 15/-- 1 can 05/02/2018 - currently everyday    Tobacco Never --/-- --  05/02/2018 - never never been a smoker         Immunizations  NameDate Admin Mfg Trade Name Lot Number Route Inj VIS Given VIS Publication   InfluenzaRefused 03/10/2016 NE Not Entered  NE NE     Comments: Pt refused.   InfluenzaRefused 09/09/2015 NE Not Entered  NE NE     Comments: Pt does not get flu vaccine.   Tdap09/01/2012 NE Not Entered  NE NE 09/09/2015 01/24/2012   Comments:          Review of Systems  · Constitutional  o Denies  o : fever, weight gain, weight loss, malaise/fatigue  · Eyes  o Denies  o : diplopia, recent changes, blurred vision,  · HENT  o Denies  o : sore throat, hearing changes, tinnitus, nose bleeding, sinus pain, nasal discharge, ear pain  · Cardiovascular  o Admits  o : HTN, hyperlipidemia  o Denies  o : palpitation, chest pain, claudication, pedal  stiffness.   Skin: Positive for rash. Negative for color change and pallor.   Allergic/Immunologic: Negative for immunocompromised state.   Neurological: Negative for dizziness, seizures and numbness.   Hematological: Positive for adenopathy. Does not bruise/bleed easily.   Psychiatric/Behavioral: Negative for agitation and behavioral problems.     Objective:     Vital Signs (Most Recent):  Temp: 97.9 °F (36.6 °C) (01/24/19 0800)  Pulse: 106 (01/24/19 0800)  Resp: 19 (01/24/19 0800)  BP: 133/70 (01/24/19 0800)  SpO2: 98 % (01/24/19 0800) Vital Signs (24h Range):  Temp:  [97.5 °F (36.4 °C)-97.9 °F (36.6 °C)] 97.9 °F (36.6 °C)  Pulse:  [] 106  Resp:  [18-19] 19  SpO2:  [95 %-100 %] 98 %  BP: (133-185)/(70-86) 133/70     Weight: 88.7 kg (195 lb 9.6 oz)  Body mass index is 32.55 kg/m².  Body surface area is 2.02 meters squared.      Intake/Output Summary (Last 24 hours) at 1/24/2019 1112  Last data filed at 1/24/2019 1026  Gross per 24 hour   Intake 6861.67 ml   Output 3900 ml   Net 2961.67 ml       Physical Exam   Constitutional: She is oriented to person, place, and time. She appears well-developed and well-nourished. No distress.   HENT:   Head: Atraumatic.   Nose: Nose normal.   Mouth/Throat: Oropharyngeal exudate (white tongue exudate) present.   Face appears swollen   Eyes: Conjunctivae and EOM are normal. Pupils are equal, round, and reactive to light. Right eye exhibits no discharge. Left eye exhibits no discharge. No scleral icterus.   Neck: Normal range of motion. Neck supple. No tracheal deviation present.   Cardiovascular: Normal rate, regular rhythm and intact distal pulses. Exam reveals no gallop and no friction rub.   Murmur (systolic murmur appreciated over left costal margin) heard.  Pulmonary/Chest: Effort normal and breath sounds normal. No respiratory distress. She has no wheezes. She has no rales. She exhibits no tenderness.   Abdominal: Soft. Bowel sounds are normal. She exhibits no  distension and no mass. There is no tenderness. There is no rebound and no guarding.   Musculoskeletal: Normal range of motion. She exhibits edema (facial edema, upper extremity edema). She exhibits no tenderness or deformity.   Neurological: She is alert and oriented to person, place, and time. No cranial nerve deficit or sensory deficit.     3/5 shoulder strength on abduction; range of motion limited by weakness  5/5 extension of forearms strength against resistance  5/5 lower extremity extensors     Skin: Skin is warm and dry. Capillary refill takes less than 2 seconds. No rash noted. She is not diaphoretic. No erythema. No pallor.   Psychiatric: She has a normal mood and affect.       Significant Labs:   CBC:   Recent Labs   Lab 01/23/19  0300 01/24/19  0340   WBC 9.11 9.03   HGB 10.5* 9.8*   HCT 33.4* 30.7*    197    and CMP:   Recent Labs   Lab 01/22/19  2224 01/23/19  0300 01/24/19  0340   * 134* 138   K 3.7 3.9 3.4*   CL 96 100 107   CO2 26 23 24   * 132* 137*   BUN 20 16 7   CREATININE 0.8 0.7 0.5   CALCIUM 8.7 8.4* 8.3*   PROT 6.7 6.0 5.8*   ALBUMIN 3.0* 2.6* 2.5*   BILITOT 0.4 0.3 0.3   ALKPHOS 96 83 77   * 241* 190*   ALT 73* 64* 57*   ANIONGAP 8 11 7*   EGFRNONAA >60.0 >60.0 >60.0       Diagnostic Results:  I have reviewed and interpreted all pertinent imaging results/findings within the past 24 hours.   MRI Humerus W WO Contrast Left   Final Result   Abnormal      1. Diffuse edema and enhancement of the visualized left upper extremity musculature, most pronounced proximally, most notably of the deltoid and biceps musculature as detailed above.  Findings are nonspecific although could relate to a nonspecific myelitis particularly in light of patient's reported history.  Clinical correlation with appropriate history and lab markers advised.   2. No evidence of abscess or osteomyelitis.   This report was flagged in Epic as abnormal.         Electronically signed by: Lynn Amato  edema  · Respiratory  o Denies  o : shortness of breath, ALCALA, cough  · Gastrointestinal  o Denies  o : nausea, vomiting, reflux, diarrhea, constipation, abdominal pain,  · Genitourinary  o Admits  o : anorgasmia  o Denies  o : frequency, urgency, dysuria, incontinence, nocturia,  · Neurologic  o Denies  o : unsteady gait, weakness, dizziness, H/A  · Musculoskeletal  o Denies  o : myalgias, joint pain, joint swelling  · Endocrine  o Denies  o : heat intolerance, cold intolerance, polyuria, polydipsia  · Psychiatric  o Admits  o : anx/dep  o Denies  o : suicidal ideation, homicidal ideation, mood changes, hallucinations, memory      Physical Examination  · Constitutional  o Appearance  o : well-nourished, well developed, alert, in no acute distress  · Head and Face  o Head  o :   § Inspection  § : atraumatic, normocephalic  o Face  o :   § Inspection  § : no facial lesions  · Ears, Nose, Mouth and Throat  o Ears  o :   § External Ears  § : appearance within normal limits, no lesions present  o Nose  o :   § External Nose  § : normal stucture noted.  · Neck  o Inspection/Palpation  o : normal appearance, no masses or tenderness, trachea midline  o Range of Motion  o : range of motion within normal limits  · Respiratory  o Respiratory Effort  o : breathing unlabored  · Skin and Subcutaneous Tissue  o General Inspection  o : no rashes or lesions present, no areas of discoloration  · Neurologic  o Mental Status Examination  o :   § Orientation  § : grossly oriented to person, place and time  o Cranial Nerves  o : cranial nerves intact and symmetric throughout  · Psychiatric  o Mood and Affect  o : mood normal, affect appropriate, denies any SI/HI          Assessment  · Anxiety disorder     300.00/F41.9  · Depression     311/F32.9  · Hyperlipidemia     272.4/E78.5  · Other long term (current) drug therapy     V58.69/Z79.899  · Hypertension     401.9/I10      Plan  · Orders  o [facility] ([id number]) - 401.9/I10, V58.69/Z79.899,  272.4/E78.5 - 07/21/2020  o Lipid Panel [facility] ([id number]) - 401.9/I10, V58.69/Z79.899, 272.4/E78.5 - 07/21/2020  o ACO-39: Current medications updated and reviewed () - - 07/21/2020  · Medications  o Medications have been Reconciled  o Transition of Care or Provider Policy  · Instructions  o Advised that cheeses and other sources of dairy fats, animal fats, fast food, and the extras (candy, pastries, pies, doughnuts and cookies) all contain LDL raising nutrients. Advised to increase fruits, vegetables, whole grains, and to monitor portion sizes.   o Handouts were given to patient:   o Patient is taking medications as prescribed and doing well.   o Take all medications as prescribed/directed.  o Patient was educated/instructed on their diagnosis, treatment and medications prior to discharge from the clinic today.  o Patient instructed to seek medical attention urgently for new or worsening symptoms.  o Call the office with any concerns or questions.  o Chronic conditions reviewed and taken into consideration for today's treatment plan.  · Disposition  o Call or Return if symptoms worsen or persist.  o follow up 6 months  o follow up prn or as needed  o Care Transition            Electronically Signed by: LYDIA Rollins -Author on July 22, 2020 12:00:49 AM   MD   Date:    01/23/2019   Time:    23:17      Chest X-ray, PA And Lateral   Final Result   Abnormal      Port catheter with its tip directed toward the innominate vein.  Significant dextroscoliosis.      Surgical staple overlies the left upper quadrant appears new compared to prior.  This is an uncertain location on this single projection.      This report was flagged in Epic as abnormal.         Electronically signed by: Raghu Fung MD   Date:    01/23/2019   Time:    07:38

## 2021-05-14 ENCOUNTER — PATIENT MESSAGE (OUTPATIENT)
Dept: PHARMACY | Facility: CLINIC | Age: 61
End: 2021-05-14

## 2021-05-19 ENCOUNTER — SPECIALTY PHARMACY (OUTPATIENT)
Dept: PHARMACY | Facility: CLINIC | Age: 61
End: 2021-05-19

## 2021-05-25 ENCOUNTER — PATIENT MESSAGE (OUTPATIENT)
Dept: HEMATOLOGY/ONCOLOGY | Facility: CLINIC | Age: 61
End: 2021-05-25

## 2021-05-25 DIAGNOSIS — C50.412 MALIGNANT NEOPLASM OF UPPER-OUTER QUADRANT OF LEFT BREAST IN FEMALE, ESTROGEN RECEPTOR NEGATIVE: Primary | ICD-10-CM

## 2021-05-25 DIAGNOSIS — Z17.1 MALIGNANT NEOPLASM OF UPPER-OUTER QUADRANT OF LEFT BREAST IN FEMALE, ESTROGEN RECEPTOR NEGATIVE: Primary | ICD-10-CM

## 2021-05-31 ENCOUNTER — TELEPHONE (OUTPATIENT)
Dept: INTERVENTIONAL RADIOLOGY/VASCULAR | Facility: HOSPITAL | Age: 61
End: 2021-05-31

## 2021-06-02 ENCOUNTER — INFUSION (OUTPATIENT)
Dept: INFECTIOUS DISEASES | Facility: HOSPITAL | Age: 61
End: 2021-06-02
Attending: INTERNAL MEDICINE
Payer: COMMERCIAL

## 2021-06-02 VITALS
DIASTOLIC BLOOD PRESSURE: 72 MMHG | OXYGEN SATURATION: 100 % | RESPIRATION RATE: 18 BRPM | HEIGHT: 65 IN | HEART RATE: 105 BPM | BODY MASS INDEX: 28.86 KG/M2 | WEIGHT: 173.19 LBS | SYSTOLIC BLOOD PRESSURE: 138 MMHG | TEMPERATURE: 98 F

## 2021-06-02 DIAGNOSIS — R13.19 ESOPHAGEAL DYSPHAGIA: ICD-10-CM

## 2021-06-02 DIAGNOSIS — M33.13 DERMATOMYOSITIS: Primary | ICD-10-CM

## 2021-06-02 PROCEDURE — 96366 THER/PROPH/DIAG IV INF ADDON: CPT

## 2021-06-02 PROCEDURE — 96365 THER/PROPH/DIAG IV INF INIT: CPT

## 2021-06-02 PROCEDURE — 25000003 PHARM REV CODE 250: Performed by: INTERNAL MEDICINE

## 2021-06-02 PROCEDURE — 96367 TX/PROPH/DG ADDL SEQ IV INF: CPT

## 2021-06-02 PROCEDURE — 96375 TX/PRO/DX INJ NEW DRUG ADDON: CPT

## 2021-06-02 PROCEDURE — 63600175 PHARM REV CODE 636 W HCPCS: Performed by: INTERNAL MEDICINE

## 2021-06-02 RX ORDER — FAMOTIDINE 10 MG/ML
20 INJECTION INTRAVENOUS
Status: COMPLETED | OUTPATIENT
Start: 2021-06-02 | End: 2021-06-02

## 2021-06-02 RX ORDER — ACETAMINOPHEN 325 MG/1
650 TABLET ORAL
Status: CANCELLED | OUTPATIENT
Start: 2021-06-30

## 2021-06-02 RX ORDER — FAMOTIDINE 10 MG/ML
20 INJECTION INTRAVENOUS
Status: CANCELLED | OUTPATIENT
Start: 2021-06-30

## 2021-06-02 RX ORDER — HEPARIN 100 UNIT/ML
500 SYRINGE INTRAVENOUS
Status: DISCONTINUED | OUTPATIENT
Start: 2021-06-02 | End: 2021-06-02 | Stop reason: HOSPADM

## 2021-06-02 RX ORDER — SODIUM CHLORIDE 0.9 % (FLUSH) 0.9 %
10 SYRINGE (ML) INJECTION
Status: DISCONTINUED | OUTPATIENT
Start: 2021-06-02 | End: 2021-06-02 | Stop reason: HOSPADM

## 2021-06-02 RX ORDER — ACETAMINOPHEN 325 MG/1
650 TABLET ORAL
Status: COMPLETED | OUTPATIENT
Start: 2021-06-02 | End: 2021-06-02

## 2021-06-02 RX ORDER — HEPARIN 100 UNIT/ML
500 SYRINGE INTRAVENOUS
Status: CANCELLED | OUTPATIENT
Start: 2021-06-30

## 2021-06-02 RX ORDER — SODIUM CHLORIDE 0.9 % (FLUSH) 0.9 %
10 SYRINGE (ML) INJECTION
Status: CANCELLED | OUTPATIENT
Start: 2021-06-30

## 2021-06-02 RX ADMIN — ACETAMINOPHEN 650 MG: 325 TABLET ORAL at 09:06

## 2021-06-02 RX ADMIN — FAMOTIDINE 20 MG: 10 INJECTION INTRAVENOUS at 09:06

## 2021-06-02 RX ADMIN — SODIUM CHLORIDE: 0.9 INJECTION, SOLUTION INTRAVENOUS at 09:06

## 2021-06-02 RX ADMIN — DIPHENHYDRAMINE HYDROCHLORIDE 50 MG: 50 INJECTION INTRAMUSCULAR; INTRAVENOUS at 09:06

## 2021-06-02 RX ADMIN — HUMAN IMMUNOGLOBULIN G 80 G: 40 LIQUID INTRAVENOUS at 09:06

## 2021-06-03 ENCOUNTER — INFUSION (OUTPATIENT)
Dept: INFECTIOUS DISEASES | Facility: HOSPITAL | Age: 61
End: 2021-06-03
Attending: INTERNAL MEDICINE
Payer: COMMERCIAL

## 2021-06-03 VITALS
DIASTOLIC BLOOD PRESSURE: 77 MMHG | RESPIRATION RATE: 19 BRPM | WEIGHT: 164 LBS | OXYGEN SATURATION: 99 % | HEIGHT: 65 IN | BODY MASS INDEX: 27.32 KG/M2 | HEART RATE: 91 BPM | SYSTOLIC BLOOD PRESSURE: 143 MMHG | TEMPERATURE: 99 F

## 2021-06-03 DIAGNOSIS — M33.13 DERMATOMYOSITIS: Primary | ICD-10-CM

## 2021-06-03 DIAGNOSIS — C50.412 MALIGNANT NEOPLASM OF UPPER-OUTER QUADRANT OF LEFT BREAST IN FEMALE, ESTROGEN RECEPTOR NEGATIVE: Primary | ICD-10-CM

## 2021-06-03 DIAGNOSIS — Z17.1 MALIGNANT NEOPLASM OF UPPER-OUTER QUADRANT OF LEFT BREAST IN FEMALE, ESTROGEN RECEPTOR NEGATIVE: Primary | ICD-10-CM

## 2021-06-03 DIAGNOSIS — R13.19 ESOPHAGEAL DYSPHAGIA: ICD-10-CM

## 2021-06-03 PROCEDURE — 96365 THER/PROPH/DIAG IV INF INIT: CPT

## 2021-06-03 PROCEDURE — 25000003 PHARM REV CODE 250: Performed by: INTERNAL MEDICINE

## 2021-06-03 PROCEDURE — 63600175 PHARM REV CODE 636 W HCPCS: Mod: JG | Performed by: INTERNAL MEDICINE

## 2021-06-03 PROCEDURE — 96366 THER/PROPH/DIAG IV INF ADDON: CPT

## 2021-06-03 PROCEDURE — 96375 TX/PRO/DX INJ NEW DRUG ADDON: CPT

## 2021-06-03 PROCEDURE — 96367 TX/PROPH/DG ADDL SEQ IV INF: CPT

## 2021-06-03 RX ORDER — FAMOTIDINE 10 MG/ML
20 INJECTION INTRAVENOUS
Status: CANCELLED | OUTPATIENT
Start: 2021-06-30

## 2021-06-03 RX ORDER — SODIUM CHLORIDE 0.9 % (FLUSH) 0.9 %
10 SYRINGE (ML) INJECTION
Status: CANCELLED | OUTPATIENT
Start: 2021-06-30

## 2021-06-03 RX ORDER — HEPARIN 100 UNIT/ML
500 SYRINGE INTRAVENOUS
Status: CANCELLED | OUTPATIENT
Start: 2021-06-30

## 2021-06-03 RX ORDER — MIDAZOLAM HYDROCHLORIDE 1 MG/ML
1 INJECTION INTRAMUSCULAR; INTRAVENOUS
Status: CANCELLED | OUTPATIENT
Start: 2021-06-03

## 2021-06-03 RX ORDER — SODIUM CHLORIDE 0.9 % (FLUSH) 0.9 %
10 SYRINGE (ML) INJECTION
Status: DISCONTINUED | OUTPATIENT
Start: 2021-06-03 | End: 2021-06-03 | Stop reason: HOSPADM

## 2021-06-03 RX ORDER — FAMOTIDINE 10 MG/ML
20 INJECTION INTRAVENOUS
Status: COMPLETED | OUTPATIENT
Start: 2021-06-03 | End: 2021-06-03

## 2021-06-03 RX ORDER — ACETAMINOPHEN 325 MG/1
650 TABLET ORAL
Status: CANCELLED | OUTPATIENT
Start: 2021-06-30

## 2021-06-03 RX ORDER — ACETAMINOPHEN 325 MG/1
650 TABLET ORAL
Status: COMPLETED | OUTPATIENT
Start: 2021-06-03 | End: 2021-06-03

## 2021-06-03 RX ORDER — FENTANYL CITRATE 50 UG/ML
50 INJECTION, SOLUTION INTRAMUSCULAR; INTRAVENOUS
Status: CANCELLED | OUTPATIENT
Start: 2021-06-03

## 2021-06-03 RX ADMIN — SODIUM CHLORIDE: 0.9 INJECTION, SOLUTION INTRAVENOUS at 08:06

## 2021-06-03 RX ADMIN — ACETAMINOPHEN 650 MG: 325 TABLET ORAL at 08:06

## 2021-06-03 RX ADMIN — HUMAN IMMUNOGLOBULIN G 75 G: 5 LIQUID INTRAVENOUS at 09:06

## 2021-06-03 RX ADMIN — DIPHENHYDRAMINE HYDROCHLORIDE 50 MG: 50 INJECTION INTRAMUSCULAR; INTRAVENOUS at 08:06

## 2021-06-03 RX ADMIN — FAMOTIDINE 20 MG: 10 INJECTION INTRAVENOUS at 08:06

## 2021-06-07 ENCOUNTER — CLINICAL SUPPORT (OUTPATIENT)
Dept: INTERNAL MEDICINE | Facility: CLINIC | Age: 61
End: 2021-06-07
Payer: COMMERCIAL

## 2021-06-07 VITALS — DIASTOLIC BLOOD PRESSURE: 74 MMHG | HEART RATE: 95 BPM | SYSTOLIC BLOOD PRESSURE: 130 MMHG

## 2021-06-07 DIAGNOSIS — J31.0 CHRONIC RHINITIS: ICD-10-CM

## 2021-06-07 PROCEDURE — 99999 PR PBB SHADOW E&M-EST. PATIENT-LVL I: CPT | Mod: PBBFAC,,,

## 2021-06-07 PROCEDURE — 99999 PR PBB SHADOW E&M-EST. PATIENT-LVL I: ICD-10-PCS | Mod: PBBFAC,,,

## 2021-06-07 RX ORDER — AZELASTINE 1 MG/ML
1 SPRAY, METERED NASAL 2 TIMES DAILY
Qty: 30 ML | Refills: 2 | Status: SHIPPED | OUTPATIENT
Start: 2021-06-07 | End: 2022-07-19 | Stop reason: SDUPTHER

## 2021-06-09 ENCOUNTER — PATIENT MESSAGE (OUTPATIENT)
Dept: INTERNAL MEDICINE | Facility: CLINIC | Age: 61
End: 2021-06-09

## 2021-06-10 ENCOUNTER — HOSPITAL ENCOUNTER (OUTPATIENT)
Dept: INTERVENTIONAL RADIOLOGY/VASCULAR | Facility: HOSPITAL | Age: 61
Discharge: HOME | End: 2021-06-10
Attending: INTERNAL MEDICINE
Payer: COMMERCIAL

## 2021-06-10 VITALS
BODY MASS INDEX: 28.62 KG/M2 | TEMPERATURE: 98 F | RESPIRATION RATE: 16 BRPM | SYSTOLIC BLOOD PRESSURE: 129 MMHG | DIASTOLIC BLOOD PRESSURE: 77 MMHG | WEIGHT: 172 LBS | HEART RATE: 77 BPM | OXYGEN SATURATION: 100 %

## 2021-06-10 DIAGNOSIS — Z17.1 MALIGNANT NEOPLASM OF UPPER-OUTER QUADRANT OF LEFT BREAST IN FEMALE, ESTROGEN RECEPTOR NEGATIVE: ICD-10-CM

## 2021-06-10 DIAGNOSIS — C50.412 MALIGNANT NEOPLASM OF UPPER-OUTER QUADRANT OF LEFT BREAST IN FEMALE, ESTROGEN RECEPTOR NEGATIVE: ICD-10-CM

## 2021-06-10 PROCEDURE — 99152 MOD SED SAME PHYS/QHP 5/>YRS: CPT | Performed by: STUDENT IN AN ORGANIZED HEALTH CARE EDUCATION/TRAINING PROGRAM

## 2021-06-10 PROCEDURE — 63600175 PHARM REV CODE 636 W HCPCS: Performed by: PHYSICIAN ASSISTANT

## 2021-06-10 PROCEDURE — 99152 MOD SED SAME PHYS/QHP 5/>YRS: CPT | Mod: ,,, | Performed by: STUDENT IN AN ORGANIZED HEALTH CARE EDUCATION/TRAINING PROGRAM

## 2021-06-10 PROCEDURE — 36590 IR TUNNELED CATH REMOVAL W/PORT: ICD-10-PCS | Mod: ,,, | Performed by: STUDENT IN AN ORGANIZED HEALTH CARE EDUCATION/TRAINING PROGRAM

## 2021-06-10 PROCEDURE — 63600175 PHARM REV CODE 636 W HCPCS: Performed by: STUDENT IN AN ORGANIZED HEALTH CARE EDUCATION/TRAINING PROGRAM

## 2021-06-10 PROCEDURE — 36590 REMOVAL TUNNELED CV CATH: CPT | Performed by: STUDENT IN AN ORGANIZED HEALTH CARE EDUCATION/TRAINING PROGRAM

## 2021-06-10 PROCEDURE — 99152 PR MOD CONSCIOUS SEDATION, SAME PHYS, 5+ YRS, FIRST 15 MIN: ICD-10-PCS | Mod: ,,, | Performed by: STUDENT IN AN ORGANIZED HEALTH CARE EDUCATION/TRAINING PROGRAM

## 2021-06-10 PROCEDURE — A4550 SURGICAL TRAYS: HCPCS

## 2021-06-10 RX ORDER — MIDAZOLAM HYDROCHLORIDE 1 MG/ML
INJECTION INTRAMUSCULAR; INTRAVENOUS CODE/TRAUMA/SEDATION MEDICATION
Status: COMPLETED | OUTPATIENT
Start: 2021-06-10 | End: 2021-06-10

## 2021-06-10 RX ORDER — SODIUM CHLORIDE 9 MG/ML
INJECTION, SOLUTION INTRAVENOUS CONTINUOUS
Status: DISCONTINUED | OUTPATIENT
Start: 2021-06-10 | End: 2021-06-11 | Stop reason: HOSPADM

## 2021-06-10 RX ORDER — FENTANYL CITRATE 50 UG/ML
INJECTION, SOLUTION INTRAMUSCULAR; INTRAVENOUS CODE/TRAUMA/SEDATION MEDICATION
Status: COMPLETED | OUTPATIENT
Start: 2021-06-10 | End: 2021-06-10

## 2021-06-10 RX ORDER — ONDANSETRON 2 MG/ML
4 INJECTION INTRAMUSCULAR; INTRAVENOUS EVERY 6 HOURS PRN
Status: DISCONTINUED | OUTPATIENT
Start: 2021-06-10 | End: 2021-06-11 | Stop reason: HOSPADM

## 2021-06-10 RX ORDER — CEFAZOLIN SODIUM 1 G/3ML
1 INJECTION, POWDER, FOR SOLUTION INTRAMUSCULAR; INTRAVENOUS
Status: COMPLETED | OUTPATIENT
Start: 2021-06-10 | End: 2021-06-10

## 2021-06-10 RX ADMIN — FENTANYL CITRATE 50 MCG: 50 INJECTION, SOLUTION INTRAMUSCULAR; INTRAVENOUS at 02:06

## 2021-06-10 RX ADMIN — CEFAZOLIN 1 G: 330 INJECTION, POWDER, FOR SOLUTION INTRAMUSCULAR; INTRAVENOUS at 02:06

## 2021-06-10 RX ADMIN — MIDAZOLAM HYDROCHLORIDE 1 MG: 1 INJECTION INTRAMUSCULAR; INTRAVENOUS at 02:06

## 2021-06-22 ENCOUNTER — PATIENT MESSAGE (OUTPATIENT)
Dept: PHARMACY | Facility: CLINIC | Age: 61
End: 2021-06-22

## 2021-06-23 ENCOUNTER — SPECIALTY PHARMACY (OUTPATIENT)
Dept: PHARMACY | Facility: CLINIC | Age: 61
End: 2021-06-23

## 2021-06-30 ENCOUNTER — INFUSION (OUTPATIENT)
Dept: INFECTIOUS DISEASES | Facility: HOSPITAL | Age: 61
End: 2021-06-30
Attending: INTERNAL MEDICINE
Payer: COMMERCIAL

## 2021-06-30 VITALS
SYSTOLIC BLOOD PRESSURE: 156 MMHG | BODY MASS INDEX: 28.78 KG/M2 | HEIGHT: 65 IN | HEART RATE: 89 BPM | DIASTOLIC BLOOD PRESSURE: 86 MMHG | OXYGEN SATURATION: 100 % | WEIGHT: 172.75 LBS | TEMPERATURE: 99 F | RESPIRATION RATE: 18 BRPM

## 2021-06-30 DIAGNOSIS — R13.19 ESOPHAGEAL DYSPHAGIA: ICD-10-CM

## 2021-06-30 DIAGNOSIS — M33.13 DERMATOMYOSITIS: Primary | ICD-10-CM

## 2021-06-30 PROCEDURE — 25000003 PHARM REV CODE 250: Performed by: INTERNAL MEDICINE

## 2021-06-30 PROCEDURE — 96367 TX/PROPH/DG ADDL SEQ IV INF: CPT

## 2021-06-30 PROCEDURE — 63600175 PHARM REV CODE 636 W HCPCS: Performed by: INTERNAL MEDICINE

## 2021-06-30 PROCEDURE — 96365 THER/PROPH/DIAG IV INF INIT: CPT

## 2021-06-30 PROCEDURE — 96375 TX/PRO/DX INJ NEW DRUG ADDON: CPT

## 2021-06-30 PROCEDURE — 96366 THER/PROPH/DIAG IV INF ADDON: CPT

## 2021-06-30 RX ORDER — FAMOTIDINE 10 MG/ML
20 INJECTION INTRAVENOUS
Status: CANCELLED | OUTPATIENT
Start: 2021-07-28

## 2021-06-30 RX ORDER — HEPARIN 100 UNIT/ML
500 SYRINGE INTRAVENOUS
Status: DISCONTINUED | OUTPATIENT
Start: 2021-06-30 | End: 2021-06-30 | Stop reason: HOSPADM

## 2021-06-30 RX ORDER — FAMOTIDINE 10 MG/ML
20 INJECTION INTRAVENOUS
Status: COMPLETED | OUTPATIENT
Start: 2021-06-30 | End: 2021-06-30

## 2021-06-30 RX ORDER — SODIUM CHLORIDE 0.9 % (FLUSH) 0.9 %
10 SYRINGE (ML) INJECTION
Status: CANCELLED | OUTPATIENT
Start: 2021-07-28

## 2021-06-30 RX ORDER — ACETAMINOPHEN 325 MG/1
650 TABLET ORAL
Status: COMPLETED | OUTPATIENT
Start: 2021-06-30 | End: 2021-06-30

## 2021-06-30 RX ORDER — SODIUM CHLORIDE 0.9 % (FLUSH) 0.9 %
10 SYRINGE (ML) INJECTION
Status: DISCONTINUED | OUTPATIENT
Start: 2021-06-30 | End: 2021-06-30 | Stop reason: HOSPADM

## 2021-06-30 RX ORDER — HEPARIN 100 UNIT/ML
500 SYRINGE INTRAVENOUS
Status: CANCELLED | OUTPATIENT
Start: 2021-07-28

## 2021-06-30 RX ORDER — ACETAMINOPHEN 325 MG/1
650 TABLET ORAL
Status: CANCELLED | OUTPATIENT
Start: 2021-07-28

## 2021-06-30 RX ADMIN — SODIUM CHLORIDE: 0.9 INJECTION, SOLUTION INTRAVENOUS at 08:06

## 2021-06-30 RX ADMIN — FAMOTIDINE 20 MG: 10 INJECTION INTRAVENOUS at 08:06

## 2021-06-30 RX ADMIN — ACETAMINOPHEN 650 MG: 325 TABLET ORAL at 08:06

## 2021-06-30 RX ADMIN — HUMAN IMMUNOGLOBULIN G 80 G: 20 LIQUID INTRAVENOUS at 09:06

## 2021-06-30 RX ADMIN — DIPHENHYDRAMINE HYDROCHLORIDE 50 MG: 50 INJECTION INTRAMUSCULAR; INTRAVENOUS at 08:06

## 2021-07-01 ENCOUNTER — INFUSION (OUTPATIENT)
Dept: INFECTIOUS DISEASES | Facility: HOSPITAL | Age: 61
End: 2021-07-01
Attending: INTERNAL MEDICINE
Payer: MEDICARE

## 2021-07-01 VITALS
RESPIRATION RATE: 18 BRPM | HEART RATE: 102 BPM | BODY MASS INDEX: 28.96 KG/M2 | TEMPERATURE: 98 F | SYSTOLIC BLOOD PRESSURE: 146 MMHG | HEIGHT: 65 IN | OXYGEN SATURATION: 99 % | WEIGHT: 173.81 LBS | DIASTOLIC BLOOD PRESSURE: 76 MMHG

## 2021-07-01 DIAGNOSIS — R13.19 ESOPHAGEAL DYSPHAGIA: ICD-10-CM

## 2021-07-01 DIAGNOSIS — M33.13 DERMATOMYOSITIS: Primary | ICD-10-CM

## 2021-07-01 PROCEDURE — 63600175 PHARM REV CODE 636 W HCPCS: Mod: JG | Performed by: INTERNAL MEDICINE

## 2021-07-01 PROCEDURE — 96365 THER/PROPH/DIAG IV INF INIT: CPT

## 2021-07-01 PROCEDURE — 96366 THER/PROPH/DIAG IV INF ADDON: CPT

## 2021-07-01 PROCEDURE — 96375 TX/PRO/DX INJ NEW DRUG ADDON: CPT

## 2021-07-01 PROCEDURE — 96367 TX/PROPH/DG ADDL SEQ IV INF: CPT

## 2021-07-01 PROCEDURE — 25000003 PHARM REV CODE 250: Performed by: INTERNAL MEDICINE

## 2021-07-01 RX ORDER — SODIUM CHLORIDE 0.9 % (FLUSH) 0.9 %
10 SYRINGE (ML) INJECTION
Status: DISCONTINUED | OUTPATIENT
Start: 2021-07-01 | End: 2021-07-01 | Stop reason: HOSPADM

## 2021-07-01 RX ORDER — ACETAMINOPHEN 325 MG/1
650 TABLET ORAL
Status: COMPLETED | OUTPATIENT
Start: 2021-07-01 | End: 2021-07-01

## 2021-07-01 RX ORDER — ACETAMINOPHEN 325 MG/1
650 TABLET ORAL
Status: CANCELLED | OUTPATIENT
Start: 2021-07-29

## 2021-07-01 RX ORDER — FAMOTIDINE 10 MG/ML
20 INJECTION INTRAVENOUS
Status: COMPLETED | OUTPATIENT
Start: 2021-07-01 | End: 2021-07-01

## 2021-07-01 RX ORDER — FAMOTIDINE 10 MG/ML
20 INJECTION INTRAVENOUS
Status: CANCELLED | OUTPATIENT
Start: 2021-07-29

## 2021-07-01 RX ORDER — HEPARIN 100 UNIT/ML
500 SYRINGE INTRAVENOUS
Status: CANCELLED | OUTPATIENT
Start: 2021-07-29

## 2021-07-01 RX ORDER — SODIUM CHLORIDE 0.9 % (FLUSH) 0.9 %
10 SYRINGE (ML) INJECTION
Status: CANCELLED | OUTPATIENT
Start: 2021-07-29

## 2021-07-01 RX ORDER — HEPARIN 100 UNIT/ML
500 SYRINGE INTRAVENOUS
Status: DISCONTINUED | OUTPATIENT
Start: 2021-07-01 | End: 2021-07-01 | Stop reason: HOSPADM

## 2021-07-01 RX ADMIN — ACETAMINOPHEN 650 MG: 325 TABLET ORAL at 09:07

## 2021-07-01 RX ADMIN — DIPHENHYDRAMINE HYDROCHLORIDE 50 MG: 50 INJECTION INTRAMUSCULAR; INTRAVENOUS at 09:07

## 2021-07-01 RX ADMIN — HUMAN IMMUNOGLOBULIN G 80 G: 20 LIQUID INTRAVENOUS at 10:07

## 2021-07-01 RX ADMIN — SODIUM CHLORIDE: 0.9 INJECTION, SOLUTION INTRAVENOUS at 09:07

## 2021-07-01 RX ADMIN — FAMOTIDINE 20 MG: 10 INJECTION INTRAVENOUS at 09:07

## 2021-07-16 ENCOUNTER — PATIENT MESSAGE (OUTPATIENT)
Dept: INTERNAL MEDICINE | Facility: CLINIC | Age: 61
End: 2021-07-16

## 2021-07-21 ENCOUNTER — PATIENT MESSAGE (OUTPATIENT)
Dept: PHARMACY | Facility: CLINIC | Age: 61
End: 2021-07-21

## 2021-07-27 ENCOUNTER — SPECIALTY PHARMACY (OUTPATIENT)
Dept: PHARMACY | Facility: CLINIC | Age: 61
End: 2021-07-27

## 2021-07-29 ENCOUNTER — INFUSION (OUTPATIENT)
Dept: INFECTIOUS DISEASES | Facility: HOSPITAL | Age: 61
End: 2021-07-29
Attending: INTERNAL MEDICINE
Payer: MEDICARE

## 2021-07-29 VITALS
BODY MASS INDEX: 28.81 KG/M2 | WEIGHT: 172.94 LBS | HEIGHT: 65 IN | HEART RATE: 99 BPM | SYSTOLIC BLOOD PRESSURE: 150 MMHG | DIASTOLIC BLOOD PRESSURE: 85 MMHG | TEMPERATURE: 99 F

## 2021-07-29 DIAGNOSIS — M33.13 DERMATOMYOSITIS: Primary | ICD-10-CM

## 2021-07-29 DIAGNOSIS — R13.19 ESOPHAGEAL DYSPHAGIA: ICD-10-CM

## 2021-07-29 PROCEDURE — 25000003 PHARM REV CODE 250: Performed by: INTERNAL MEDICINE

## 2021-07-29 PROCEDURE — 96367 TX/PROPH/DG ADDL SEQ IV INF: CPT

## 2021-07-29 PROCEDURE — 96365 THER/PROPH/DIAG IV INF INIT: CPT

## 2021-07-29 PROCEDURE — 96375 TX/PRO/DX INJ NEW DRUG ADDON: CPT

## 2021-07-29 PROCEDURE — 63600175 PHARM REV CODE 636 W HCPCS: Mod: JG | Performed by: INTERNAL MEDICINE

## 2021-07-29 PROCEDURE — 96366 THER/PROPH/DIAG IV INF ADDON: CPT

## 2021-07-29 RX ORDER — ACETAMINOPHEN 325 MG/1
650 TABLET ORAL
Status: COMPLETED | OUTPATIENT
Start: 2021-07-29 | End: 2021-07-29

## 2021-07-29 RX ORDER — FAMOTIDINE 10 MG/ML
20 INJECTION INTRAVENOUS
Status: COMPLETED | OUTPATIENT
Start: 2021-07-29 | End: 2021-07-29

## 2021-07-29 RX ORDER — SODIUM CHLORIDE 0.9 % (FLUSH) 0.9 %
10 SYRINGE (ML) INJECTION
Status: DISCONTINUED | OUTPATIENT
Start: 2021-07-29 | End: 2021-07-29 | Stop reason: HOSPADM

## 2021-07-29 RX ORDER — HEPARIN 100 UNIT/ML
500 SYRINGE INTRAVENOUS
Status: DISCONTINUED | OUTPATIENT
Start: 2021-07-29 | End: 2021-07-29 | Stop reason: HOSPADM

## 2021-07-29 RX ORDER — SODIUM CHLORIDE 0.9 % (FLUSH) 0.9 %
10 SYRINGE (ML) INJECTION
Status: CANCELLED | OUTPATIENT
Start: 2021-08-26

## 2021-07-29 RX ORDER — FAMOTIDINE 10 MG/ML
20 INJECTION INTRAVENOUS
Status: CANCELLED | OUTPATIENT
Start: 2021-08-26

## 2021-07-29 RX ORDER — ACETAMINOPHEN 325 MG/1
650 TABLET ORAL
Status: CANCELLED | OUTPATIENT
Start: 2021-08-26

## 2021-07-29 RX ORDER — HEPARIN 100 UNIT/ML
500 SYRINGE INTRAVENOUS
Status: CANCELLED | OUTPATIENT
Start: 2021-08-26

## 2021-07-29 RX ADMIN — HUMAN IMMUNOGLOBULIN G 80 G: 20 LIQUID INTRAVENOUS at 09:07

## 2021-07-29 RX ADMIN — SODIUM CHLORIDE: 0.9 INJECTION, SOLUTION INTRAVENOUS at 08:07

## 2021-07-29 RX ADMIN — ACETAMINOPHEN 650 MG: 325 TABLET ORAL at 08:07

## 2021-07-29 RX ADMIN — DIPHENHYDRAMINE HYDROCHLORIDE 50 MG: 50 INJECTION INTRAMUSCULAR; INTRAVENOUS at 08:07

## 2021-07-29 RX ADMIN — FAMOTIDINE 20 MG: 10 INJECTION INTRAVENOUS at 08:07

## 2021-07-30 ENCOUNTER — INFUSION (OUTPATIENT)
Dept: INFECTIOUS DISEASES | Facility: HOSPITAL | Age: 61
End: 2021-07-30
Attending: INTERNAL MEDICINE
Payer: MEDICARE

## 2021-07-30 VITALS
TEMPERATURE: 99 F | DIASTOLIC BLOOD PRESSURE: 83 MMHG | HEART RATE: 66 BPM | SYSTOLIC BLOOD PRESSURE: 144 MMHG | RESPIRATION RATE: 17 BRPM | OXYGEN SATURATION: 100 % | BODY MASS INDEX: 28.87 KG/M2 | WEIGHT: 173.31 LBS | HEIGHT: 65 IN

## 2021-07-30 DIAGNOSIS — R13.19 ESOPHAGEAL DYSPHAGIA: ICD-10-CM

## 2021-07-30 DIAGNOSIS — M33.13 DERMATOMYOSITIS: Primary | ICD-10-CM

## 2021-07-30 PROCEDURE — 96367 TX/PROPH/DG ADDL SEQ IV INF: CPT

## 2021-07-30 PROCEDURE — 96365 THER/PROPH/DIAG IV INF INIT: CPT

## 2021-07-30 PROCEDURE — 96366 THER/PROPH/DIAG IV INF ADDON: CPT

## 2021-07-30 PROCEDURE — 96375 TX/PRO/DX INJ NEW DRUG ADDON: CPT

## 2021-07-30 PROCEDURE — 25000003 PHARM REV CODE 250: Performed by: INTERNAL MEDICINE

## 2021-07-30 PROCEDURE — 63600175 PHARM REV CODE 636 W HCPCS: Performed by: INTERNAL MEDICINE

## 2021-07-30 RX ORDER — FAMOTIDINE 10 MG/ML
20 INJECTION INTRAVENOUS
Status: COMPLETED | OUTPATIENT
Start: 2021-07-30 | End: 2021-07-30

## 2021-07-30 RX ORDER — FAMOTIDINE 10 MG/ML
20 INJECTION INTRAVENOUS
Status: CANCELLED | OUTPATIENT
Start: 2021-08-26

## 2021-07-30 RX ORDER — SODIUM CHLORIDE 0.9 % (FLUSH) 0.9 %
10 SYRINGE (ML) INJECTION
Status: CANCELLED | OUTPATIENT
Start: 2021-08-26

## 2021-07-30 RX ORDER — SODIUM CHLORIDE 0.9 % (FLUSH) 0.9 %
10 SYRINGE (ML) INJECTION
Status: DISCONTINUED | OUTPATIENT
Start: 2021-07-30 | End: 2021-07-30 | Stop reason: HOSPADM

## 2021-07-30 RX ORDER — ACETAMINOPHEN 325 MG/1
650 TABLET ORAL
Status: CANCELLED | OUTPATIENT
Start: 2021-08-26

## 2021-07-30 RX ORDER — HEPARIN 100 UNIT/ML
500 SYRINGE INTRAVENOUS
Status: CANCELLED | OUTPATIENT
Start: 2021-08-26

## 2021-07-30 RX ORDER — ACETAMINOPHEN 325 MG/1
650 TABLET ORAL
Status: COMPLETED | OUTPATIENT
Start: 2021-07-30 | End: 2021-07-30

## 2021-07-30 RX ADMIN — HUMAN IMMUNOGLOBULIN G 80 G: 20 LIQUID INTRAVENOUS at 10:07

## 2021-07-30 RX ADMIN — ACETAMINOPHEN 650 MG: 325 TABLET ORAL at 10:07

## 2021-07-30 RX ADMIN — DIPHENHYDRAMINE HYDROCHLORIDE 50 MG: 50 INJECTION INTRAMUSCULAR; INTRAVENOUS at 10:07

## 2021-07-30 RX ADMIN — FAMOTIDINE 20 MG: 10 INJECTION INTRAVENOUS at 10:07

## 2021-07-30 RX ADMIN — SODIUM CHLORIDE: 0.9 INJECTION, SOLUTION INTRAVENOUS at 10:07

## 2021-08-03 ENCOUNTER — OFFICE VISIT (OUTPATIENT)
Dept: HEMATOLOGY/ONCOLOGY | Facility: CLINIC | Age: 61
End: 2021-08-03
Payer: MEDICARE

## 2021-08-03 VITALS
SYSTOLIC BLOOD PRESSURE: 160 MMHG | TEMPERATURE: 98 F | WEIGHT: 173.31 LBS | DIASTOLIC BLOOD PRESSURE: 82 MMHG | HEIGHT: 65 IN | HEART RATE: 107 BPM | BODY MASS INDEX: 28.87 KG/M2

## 2021-08-03 DIAGNOSIS — G62.0 CHEMOTHERAPY-INDUCED NEUROPATHY: ICD-10-CM

## 2021-08-03 DIAGNOSIS — C50.412 MALIGNANT NEOPLASM OF UPPER-OUTER QUADRANT OF LEFT BREAST IN FEMALE, ESTROGEN RECEPTOR NEGATIVE: Primary | ICD-10-CM

## 2021-08-03 DIAGNOSIS — T45.1X5A CHEMOTHERAPY-INDUCED NEUROPATHY: ICD-10-CM

## 2021-08-03 DIAGNOSIS — Z17.1 MALIGNANT NEOPLASM OF UPPER-OUTER QUADRANT OF LEFT BREAST IN FEMALE, ESTROGEN RECEPTOR NEGATIVE: Primary | ICD-10-CM

## 2021-08-03 DIAGNOSIS — K21.00 GASTROESOPHAGEAL REFLUX DISEASE WITH ESOPHAGITIS WITHOUT HEMORRHAGE: ICD-10-CM

## 2021-08-03 PROCEDURE — 1160F RVW MEDS BY RX/DR IN RCRD: CPT | Mod: CPTII,S$GLB,, | Performed by: NURSE PRACTITIONER

## 2021-08-03 PROCEDURE — 1160F PR REVIEW ALL MEDS BY PRESCRIBER/CLIN PHARMACIST DOCUMENTED: ICD-10-PCS | Mod: CPTII,S$GLB,, | Performed by: NURSE PRACTITIONER

## 2021-08-03 PROCEDURE — 3079F DIAST BP 80-89 MM HG: CPT | Mod: CPTII,S$GLB,, | Performed by: NURSE PRACTITIONER

## 2021-08-03 PROCEDURE — 99499 RISK ADDL DX/OHS AUDIT: ICD-10-PCS | Mod: HCNC,S$GLB,, | Performed by: NURSE PRACTITIONER

## 2021-08-03 PROCEDURE — 99214 OFFICE O/P EST MOD 30 MIN: CPT | Mod: S$GLB,,, | Performed by: NURSE PRACTITIONER

## 2021-08-03 PROCEDURE — 1126F PR PAIN SEVERITY QUANTIFIED, NO PAIN PRESENT: ICD-10-PCS | Mod: CPTII,S$GLB,, | Performed by: NURSE PRACTITIONER

## 2021-08-03 PROCEDURE — 3077F SYST BP >= 140 MM HG: CPT | Mod: CPTII,S$GLB,, | Performed by: NURSE PRACTITIONER

## 2021-08-03 PROCEDURE — 99214 PR OFFICE/OUTPT VISIT, EST, LEVL IV, 30-39 MIN: ICD-10-PCS | Mod: S$GLB,,, | Performed by: NURSE PRACTITIONER

## 2021-08-03 PROCEDURE — 1126F AMNT PAIN NOTED NONE PRSNT: CPT | Mod: CPTII,S$GLB,, | Performed by: NURSE PRACTITIONER

## 2021-08-03 PROCEDURE — 99499 UNLISTED E&M SERVICE: CPT | Mod: HCNC,S$GLB,, | Performed by: NURSE PRACTITIONER

## 2021-08-03 PROCEDURE — 99999 PR PBB SHADOW E&M-EST. PATIENT-LVL IV: CPT | Mod: PBBFAC,,, | Performed by: NURSE PRACTITIONER

## 2021-08-03 PROCEDURE — 1159F PR MEDICATION LIST DOCUMENTED IN MEDICAL RECORD: ICD-10-PCS | Mod: CPTII,S$GLB,, | Performed by: NURSE PRACTITIONER

## 2021-08-03 PROCEDURE — 3008F PR BODY MASS INDEX (BMI) DOCUMENTED: ICD-10-PCS | Mod: CPTII,S$GLB,, | Performed by: NURSE PRACTITIONER

## 2021-08-03 PROCEDURE — 1159F MED LIST DOCD IN RCRD: CPT | Mod: CPTII,S$GLB,, | Performed by: NURSE PRACTITIONER

## 2021-08-03 PROCEDURE — 3079F PR MOST RECENT DIASTOLIC BLOOD PRESSURE 80-89 MM HG: ICD-10-PCS | Mod: CPTII,S$GLB,, | Performed by: NURSE PRACTITIONER

## 2021-08-03 PROCEDURE — 3008F BODY MASS INDEX DOCD: CPT | Mod: CPTII,S$GLB,, | Performed by: NURSE PRACTITIONER

## 2021-08-03 PROCEDURE — 3077F PR MOST RECENT SYSTOLIC BLOOD PRESSURE >= 140 MM HG: ICD-10-PCS | Mod: CPTII,S$GLB,, | Performed by: NURSE PRACTITIONER

## 2021-08-03 PROCEDURE — 99999 PR PBB SHADOW E&M-EST. PATIENT-LVL IV: ICD-10-PCS | Mod: PBBFAC,,, | Performed by: NURSE PRACTITIONER

## 2021-08-24 DIAGNOSIS — M33.13 DERMATOMYOSITIS: ICD-10-CM

## 2021-08-24 RX ORDER — TOFACITINIB 5 MG/1
5 TABLET, FILM COATED ORAL 2 TIMES DAILY
Qty: 60 TABLET | Refills: 11 | Status: SHIPPED | OUTPATIENT
Start: 2021-08-24 | End: 2021-10-07

## 2021-08-25 ENCOUNTER — INFUSION (OUTPATIENT)
Dept: INFECTIOUS DISEASES | Facility: HOSPITAL | Age: 61
End: 2021-08-25
Attending: INTERNAL MEDICINE
Payer: MEDICARE

## 2021-08-25 VITALS
BODY MASS INDEX: 29.2 KG/M2 | TEMPERATURE: 99 F | OXYGEN SATURATION: 98 % | RESPIRATION RATE: 18 BRPM | WEIGHT: 175.25 LBS | DIASTOLIC BLOOD PRESSURE: 81 MMHG | HEART RATE: 99 BPM | SYSTOLIC BLOOD PRESSURE: 150 MMHG | HEIGHT: 65 IN

## 2021-08-25 DIAGNOSIS — M33.13 DERMATOMYOSITIS: Primary | ICD-10-CM

## 2021-08-25 DIAGNOSIS — R13.19 ESOPHAGEAL DYSPHAGIA: ICD-10-CM

## 2021-08-25 PROCEDURE — 96375 TX/PRO/DX INJ NEW DRUG ADDON: CPT

## 2021-08-25 PROCEDURE — 96367 TX/PROPH/DG ADDL SEQ IV INF: CPT

## 2021-08-25 PROCEDURE — 96365 THER/PROPH/DIAG IV INF INIT: CPT

## 2021-08-25 PROCEDURE — 96366 THER/PROPH/DIAG IV INF ADDON: CPT

## 2021-08-25 PROCEDURE — 63600175 PHARM REV CODE 636 W HCPCS: Mod: JG | Performed by: INTERNAL MEDICINE

## 2021-08-25 PROCEDURE — 25000003 PHARM REV CODE 250: Performed by: INTERNAL MEDICINE

## 2021-08-25 RX ORDER — HEPARIN 100 UNIT/ML
500 SYRINGE INTRAVENOUS
Status: CANCELLED | OUTPATIENT
Start: 2021-09-22

## 2021-08-25 RX ORDER — SODIUM CHLORIDE 0.9 % (FLUSH) 0.9 %
10 SYRINGE (ML) INJECTION
Status: CANCELLED | OUTPATIENT
Start: 2021-09-22

## 2021-08-25 RX ORDER — ACETAMINOPHEN 325 MG/1
650 TABLET ORAL
Status: COMPLETED | OUTPATIENT
Start: 2021-08-25 | End: 2021-08-25

## 2021-08-25 RX ORDER — HEPARIN 100 UNIT/ML
500 SYRINGE INTRAVENOUS
Status: DISCONTINUED | OUTPATIENT
Start: 2021-08-25 | End: 2021-08-25 | Stop reason: HOSPADM

## 2021-08-25 RX ORDER — SODIUM CHLORIDE 0.9 % (FLUSH) 0.9 %
10 SYRINGE (ML) INJECTION
Status: DISCONTINUED | OUTPATIENT
Start: 2021-08-25 | End: 2021-08-25 | Stop reason: HOSPADM

## 2021-08-25 RX ORDER — FAMOTIDINE 10 MG/ML
20 INJECTION INTRAVENOUS
Status: COMPLETED | OUTPATIENT
Start: 2021-08-25 | End: 2021-08-25

## 2021-08-25 RX ORDER — FAMOTIDINE 10 MG/ML
20 INJECTION INTRAVENOUS
Status: CANCELLED | OUTPATIENT
Start: 2021-09-22

## 2021-08-25 RX ORDER — ACETAMINOPHEN 325 MG/1
650 TABLET ORAL
Status: CANCELLED | OUTPATIENT
Start: 2021-09-22

## 2021-08-25 RX ADMIN — ACETAMINOPHEN 650 MG: 325 TABLET ORAL at 09:08

## 2021-08-25 RX ADMIN — SODIUM CHLORIDE: 0.9 INJECTION, SOLUTION INTRAVENOUS at 09:08

## 2021-08-25 RX ADMIN — HUMAN IMMUNOGLOBULIN G 80 G: 20 LIQUID INTRAVENOUS at 10:08

## 2021-08-25 RX ADMIN — DIPHENHYDRAMINE HYDROCHLORIDE 50 MG: 50 INJECTION INTRAMUSCULAR; INTRAVENOUS at 09:08

## 2021-08-25 RX ADMIN — FAMOTIDINE 20 MG: 10 INJECTION INTRAVENOUS at 09:08

## 2021-08-26 ENCOUNTER — PATIENT MESSAGE (OUTPATIENT)
Dept: RHEUMATOLOGY | Facility: CLINIC | Age: 61
End: 2021-08-26

## 2021-08-26 ENCOUNTER — INFUSION (OUTPATIENT)
Dept: INFECTIOUS DISEASES | Facility: HOSPITAL | Age: 61
End: 2021-08-26
Attending: INTERNAL MEDICINE
Payer: MEDICARE

## 2021-08-26 VITALS
RESPIRATION RATE: 18 BRPM | DIASTOLIC BLOOD PRESSURE: 83 MMHG | TEMPERATURE: 98 F | OXYGEN SATURATION: 100 % | SYSTOLIC BLOOD PRESSURE: 154 MMHG | HEIGHT: 65 IN | WEIGHT: 174.19 LBS | BODY MASS INDEX: 29.02 KG/M2 | HEART RATE: 105 BPM

## 2021-08-26 DIAGNOSIS — R13.19 ESOPHAGEAL DYSPHAGIA: ICD-10-CM

## 2021-08-26 DIAGNOSIS — M33.13 DERMATOMYOSITIS: Primary | ICD-10-CM

## 2021-08-26 PROCEDURE — 96375 TX/PRO/DX INJ NEW DRUG ADDON: CPT

## 2021-08-26 PROCEDURE — 96366 THER/PROPH/DIAG IV INF ADDON: CPT

## 2021-08-26 PROCEDURE — 63600175 PHARM REV CODE 636 W HCPCS: Performed by: INTERNAL MEDICINE

## 2021-08-26 PROCEDURE — 96365 THER/PROPH/DIAG IV INF INIT: CPT

## 2021-08-26 PROCEDURE — 25000003 PHARM REV CODE 250: Performed by: INTERNAL MEDICINE

## 2021-08-26 PROCEDURE — 96367 TX/PROPH/DG ADDL SEQ IV INF: CPT

## 2021-08-26 RX ORDER — HEPARIN 100 UNIT/ML
500 SYRINGE INTRAVENOUS
Status: CANCELLED | OUTPATIENT
Start: 2021-09-23

## 2021-08-26 RX ORDER — ACETAMINOPHEN 325 MG/1
650 TABLET ORAL
Status: COMPLETED | OUTPATIENT
Start: 2021-08-26 | End: 2021-08-26

## 2021-08-26 RX ORDER — HEPARIN 100 UNIT/ML
500 SYRINGE INTRAVENOUS
Status: DISCONTINUED | OUTPATIENT
Start: 2021-08-26 | End: 2021-08-26 | Stop reason: HOSPADM

## 2021-08-26 RX ORDER — SODIUM CHLORIDE 0.9 % (FLUSH) 0.9 %
10 SYRINGE (ML) INJECTION
Status: CANCELLED | OUTPATIENT
Start: 2021-09-23

## 2021-08-26 RX ORDER — FAMOTIDINE 10 MG/ML
20 INJECTION INTRAVENOUS
Status: COMPLETED | OUTPATIENT
Start: 2021-08-26 | End: 2021-08-26

## 2021-08-26 RX ORDER — ACETAMINOPHEN 325 MG/1
650 TABLET ORAL
Status: CANCELLED | OUTPATIENT
Start: 2021-09-23

## 2021-08-26 RX ORDER — SODIUM CHLORIDE 0.9 % (FLUSH) 0.9 %
10 SYRINGE (ML) INJECTION
Status: DISCONTINUED | OUTPATIENT
Start: 2021-08-26 | End: 2021-08-26 | Stop reason: HOSPADM

## 2021-08-26 RX ORDER — FAMOTIDINE 10 MG/ML
20 INJECTION INTRAVENOUS
Status: CANCELLED | OUTPATIENT
Start: 2021-09-23

## 2021-08-26 RX ADMIN — SODIUM CHLORIDE: 0.9 INJECTION, SOLUTION INTRAVENOUS at 08:08

## 2021-08-26 RX ADMIN — HUMAN IMMUNOGLOBULIN G 80 G: 40 LIQUID INTRAVENOUS at 09:08

## 2021-08-26 RX ADMIN — ACETAMINOPHEN 650 MG: 325 TABLET ORAL at 08:08

## 2021-08-26 RX ADMIN — DIPHENHYDRAMINE HYDROCHLORIDE 50 MG: 50 INJECTION INTRAMUSCULAR; INTRAVENOUS at 08:08

## 2021-08-26 RX ADMIN — FAMOTIDINE 20 MG: 10 INJECTION INTRAVENOUS at 08:08

## 2021-08-27 ENCOUNTER — SPECIALTY PHARMACY (OUTPATIENT)
Dept: PHARMACY | Facility: CLINIC | Age: 61
End: 2021-08-27

## 2021-09-06 ENCOUNTER — PATIENT MESSAGE (OUTPATIENT)
Dept: RHEUMATOLOGY | Facility: CLINIC | Age: 61
End: 2021-09-06

## 2021-09-23 ENCOUNTER — INFUSION (OUTPATIENT)
Dept: INFECTIOUS DISEASES | Facility: HOSPITAL | Age: 61
End: 2021-09-23
Attending: INTERNAL MEDICINE
Payer: MEDICARE

## 2021-09-23 VITALS
DIASTOLIC BLOOD PRESSURE: 84 MMHG | HEART RATE: 100 BPM | RESPIRATION RATE: 18 BRPM | BODY MASS INDEX: 28.54 KG/M2 | TEMPERATURE: 98 F | SYSTOLIC BLOOD PRESSURE: 152 MMHG | OXYGEN SATURATION: 99 % | HEIGHT: 65 IN | WEIGHT: 171.31 LBS

## 2021-09-23 DIAGNOSIS — R13.19 ESOPHAGEAL DYSPHAGIA: ICD-10-CM

## 2021-09-23 DIAGNOSIS — M33.13 DERMATOMYOSITIS: Primary | ICD-10-CM

## 2021-09-23 PROCEDURE — 96365 THER/PROPH/DIAG IV INF INIT: CPT

## 2021-09-23 PROCEDURE — 63600175 PHARM REV CODE 636 W HCPCS: Performed by: INTERNAL MEDICINE

## 2021-09-23 PROCEDURE — 25000003 PHARM REV CODE 250: Performed by: INTERNAL MEDICINE

## 2021-09-23 PROCEDURE — 96375 TX/PRO/DX INJ NEW DRUG ADDON: CPT

## 2021-09-23 PROCEDURE — 96367 TX/PROPH/DG ADDL SEQ IV INF: CPT

## 2021-09-23 PROCEDURE — 96366 THER/PROPH/DIAG IV INF ADDON: CPT

## 2021-09-23 RX ORDER — ACETAMINOPHEN 325 MG/1
650 TABLET ORAL
Status: CANCELLED | OUTPATIENT
Start: 2021-10-21

## 2021-09-23 RX ORDER — SODIUM CHLORIDE 0.9 % (FLUSH) 0.9 %
10 SYRINGE (ML) INJECTION
Status: DISCONTINUED | OUTPATIENT
Start: 2021-09-23 | End: 2021-09-23 | Stop reason: HOSPADM

## 2021-09-23 RX ORDER — HEPARIN 100 UNIT/ML
500 SYRINGE INTRAVENOUS
Status: CANCELLED | OUTPATIENT
Start: 2021-10-21

## 2021-09-23 RX ORDER — FAMOTIDINE 10 MG/ML
20 INJECTION INTRAVENOUS
Status: CANCELLED | OUTPATIENT
Start: 2021-10-21

## 2021-09-23 RX ORDER — FAMOTIDINE 10 MG/ML
20 INJECTION INTRAVENOUS
Status: COMPLETED | OUTPATIENT
Start: 2021-09-23 | End: 2021-09-23

## 2021-09-23 RX ORDER — SODIUM CHLORIDE 0.9 % (FLUSH) 0.9 %
10 SYRINGE (ML) INJECTION
Status: CANCELLED | OUTPATIENT
Start: 2021-10-21

## 2021-09-23 RX ORDER — ACETAMINOPHEN 325 MG/1
650 TABLET ORAL
Status: COMPLETED | OUTPATIENT
Start: 2021-09-23 | End: 2021-09-23

## 2021-09-23 RX ADMIN — SODIUM CHLORIDE: 0.9 INJECTION, SOLUTION INTRAVENOUS at 09:09

## 2021-09-23 RX ADMIN — ACETAMINOPHEN 650 MG: 325 TABLET ORAL at 09:09

## 2021-09-23 RX ADMIN — DIPHENHYDRAMINE HYDROCHLORIDE 50 MG: 50 INJECTION INTRAMUSCULAR; INTRAVENOUS at 10:09

## 2021-09-23 RX ADMIN — HUMAN IMMUNOGLOBULIN G 80 G: 20 LIQUID INTRAVENOUS at 10:09

## 2021-09-23 RX ADMIN — FAMOTIDINE 20 MG: 10 INJECTION INTRAVENOUS at 09:09

## 2021-09-24 ENCOUNTER — INFUSION (OUTPATIENT)
Dept: INFECTIOUS DISEASES | Facility: HOSPITAL | Age: 61
End: 2021-09-24
Attending: INTERNAL MEDICINE
Payer: MEDICARE

## 2021-09-24 VITALS
HEART RATE: 100 BPM | BODY MASS INDEX: 28.51 KG/M2 | SYSTOLIC BLOOD PRESSURE: 147 MMHG | RESPIRATION RATE: 18 BRPM | DIASTOLIC BLOOD PRESSURE: 84 MMHG | WEIGHT: 171.31 LBS | TEMPERATURE: 98 F | OXYGEN SATURATION: 100 %

## 2021-09-24 DIAGNOSIS — M33.13 DERMATOMYOSITIS: Primary | ICD-10-CM

## 2021-09-24 DIAGNOSIS — R13.19 ESOPHAGEAL DYSPHAGIA: ICD-10-CM

## 2021-09-24 PROCEDURE — 25000003 PHARM REV CODE 250: Mod: HCNC | Performed by: INTERNAL MEDICINE

## 2021-09-24 PROCEDURE — 96375 TX/PRO/DX INJ NEW DRUG ADDON: CPT | Mod: HCNC

## 2021-09-24 PROCEDURE — 96366 THER/PROPH/DIAG IV INF ADDON: CPT | Mod: HCNC

## 2021-09-24 PROCEDURE — 96365 THER/PROPH/DIAG IV INF INIT: CPT | Mod: HCNC

## 2021-09-24 PROCEDURE — 96367 TX/PROPH/DG ADDL SEQ IV INF: CPT | Mod: HCNC

## 2021-09-24 PROCEDURE — 63600175 PHARM REV CODE 636 W HCPCS: Mod: JG,HCNC | Performed by: INTERNAL MEDICINE

## 2021-09-24 RX ORDER — HEPARIN 100 UNIT/ML
500 SYRINGE INTRAVENOUS
Status: CANCELLED | OUTPATIENT
Start: 2021-10-21

## 2021-09-24 RX ORDER — FAMOTIDINE 10 MG/ML
20 INJECTION INTRAVENOUS
Status: CANCELLED | OUTPATIENT
Start: 2021-10-21

## 2021-09-24 RX ORDER — ACETAMINOPHEN 325 MG/1
650 TABLET ORAL
Status: CANCELLED | OUTPATIENT
Start: 2021-10-21

## 2021-09-24 RX ORDER — ACETAMINOPHEN 325 MG/1
650 TABLET ORAL
Status: COMPLETED | OUTPATIENT
Start: 2021-09-24 | End: 2021-09-24

## 2021-09-24 RX ORDER — SODIUM CHLORIDE 0.9 % (FLUSH) 0.9 %
10 SYRINGE (ML) INJECTION
Status: DISCONTINUED | OUTPATIENT
Start: 2021-09-24 | End: 2021-09-24 | Stop reason: HOSPADM

## 2021-09-24 RX ORDER — SODIUM CHLORIDE 0.9 % (FLUSH) 0.9 %
10 SYRINGE (ML) INJECTION
Status: CANCELLED | OUTPATIENT
Start: 2021-10-21

## 2021-09-24 RX ORDER — FAMOTIDINE 10 MG/ML
20 INJECTION INTRAVENOUS
Status: COMPLETED | OUTPATIENT
Start: 2021-09-24 | End: 2021-09-24

## 2021-09-24 RX ADMIN — FAMOTIDINE 20 MG: 10 INJECTION INTRAVENOUS at 09:09

## 2021-09-24 RX ADMIN — ACETAMINOPHEN 650 MG: 325 TABLET ORAL at 09:09

## 2021-09-24 RX ADMIN — DIPHENHYDRAMINE HYDROCHLORIDE 50 MG: 50 INJECTION INTRAMUSCULAR; INTRAVENOUS at 09:09

## 2021-09-24 RX ADMIN — SODIUM CHLORIDE: 0.9 INJECTION, SOLUTION INTRAVENOUS at 09:09

## 2021-09-24 RX ADMIN — HUMAN IMMUNOGLOBULIN G 80 G: 20 LIQUID INTRAVENOUS at 10:09

## 2021-09-30 ENCOUNTER — HOSPITAL ENCOUNTER (OUTPATIENT)
Dept: RADIOLOGY | Facility: HOSPITAL | Age: 61
Discharge: HOME | End: 2021-09-30
Attending: INTERNAL MEDICINE
Payer: MEDICARE

## 2021-09-30 ENCOUNTER — OFFICE VISIT (OUTPATIENT)
Dept: INTERNAL MEDICINE | Facility: CLINIC | Age: 61
End: 2021-09-30
Payer: MEDICARE

## 2021-09-30 ENCOUNTER — IMMUNIZATION (OUTPATIENT)
Dept: INTERNAL MEDICINE | Facility: CLINIC | Age: 61
End: 2021-09-30
Payer: MEDICARE

## 2021-09-30 ENCOUNTER — PATIENT MESSAGE (OUTPATIENT)
Dept: RHEUMATOLOGY | Facility: CLINIC | Age: 61
End: 2021-09-30

## 2021-09-30 VITALS
BODY MASS INDEX: 28.72 KG/M2 | SYSTOLIC BLOOD PRESSURE: 140 MMHG | HEIGHT: 65 IN | OXYGEN SATURATION: 97 % | HEART RATE: 108 BPM | TEMPERATURE: 99 F | DIASTOLIC BLOOD PRESSURE: 90 MMHG | WEIGHT: 172.38 LBS

## 2021-09-30 DIAGNOSIS — R05.3 CHRONIC COUGH: ICD-10-CM

## 2021-09-30 DIAGNOSIS — Z85.3 HISTORY OF BREAST CANCER: ICD-10-CM

## 2021-09-30 DIAGNOSIS — I10 BENIGN ESSENTIAL HYPERTENSION: Primary | ICD-10-CM

## 2021-09-30 DIAGNOSIS — R74.8 ELEVATED ALKALINE PHOSPHATASE LEVEL: ICD-10-CM

## 2021-09-30 DIAGNOSIS — M33.13 DERMATOMYOSITIS: ICD-10-CM

## 2021-09-30 DIAGNOSIS — E55.9 VITAMIN D DEFICIENCY: ICD-10-CM

## 2021-09-30 DIAGNOSIS — N63.10 BREAST MASS, RIGHT: ICD-10-CM

## 2021-09-30 PROCEDURE — G0008 ADMIN INFLUENZA VIRUS VAC: HCPCS | Mod: HCNC,S$GLB,, | Performed by: INTERNAL MEDICINE

## 2021-09-30 PROCEDURE — 3077F PR MOST RECENT SYSTOLIC BLOOD PRESSURE >= 140 MM HG: ICD-10-PCS | Mod: HCNC,CPTII,S$GLB, | Performed by: INTERNAL MEDICINE

## 2021-09-30 PROCEDURE — 1160F RVW MEDS BY RX/DR IN RCRD: CPT | Mod: HCNC,CPTII,S$GLB, | Performed by: INTERNAL MEDICINE

## 2021-09-30 PROCEDURE — 99214 OFFICE O/P EST MOD 30 MIN: CPT | Mod: 25,HCNC,S$GLB, | Performed by: INTERNAL MEDICINE

## 2021-09-30 PROCEDURE — 99999 PR PBB SHADOW E&M-EST. PATIENT-LVL IV: ICD-10-PCS | Mod: PBBFAC,HCNC,, | Performed by: INTERNAL MEDICINE

## 2021-09-30 PROCEDURE — 1159F PR MEDICATION LIST DOCUMENTED IN MEDICAL RECORD: ICD-10-PCS | Mod: HCNC,CPTII,S$GLB, | Performed by: INTERNAL MEDICINE

## 2021-09-30 PROCEDURE — 99499 UNLISTED E&M SERVICE: CPT | Mod: HCNC,S$GLB,, | Performed by: INTERNAL MEDICINE

## 2021-09-30 PROCEDURE — 3077F SYST BP >= 140 MM HG: CPT | Mod: HCNC,CPTII,S$GLB, | Performed by: INTERNAL MEDICINE

## 2021-09-30 PROCEDURE — 3008F PR BODY MASS INDEX (BMI) DOCUMENTED: ICD-10-PCS | Mod: HCNC,CPTII,S$GLB, | Performed by: INTERNAL MEDICINE

## 2021-09-30 PROCEDURE — 3008F BODY MASS INDEX DOCD: CPT | Mod: HCNC,CPTII,S$GLB, | Performed by: INTERNAL MEDICINE

## 2021-09-30 PROCEDURE — 99499 RISK ADDL DX/OHS AUDIT: ICD-10-PCS | Mod: HCNC,S$GLB,, | Performed by: INTERNAL MEDICINE

## 2021-09-30 PROCEDURE — 71046 X-RAY EXAM CHEST 2 VIEWS: CPT | Mod: 26,HCNC,, | Performed by: RADIOLOGY

## 2021-09-30 PROCEDURE — 1159F MED LIST DOCD IN RCRD: CPT | Mod: HCNC,CPTII,S$GLB, | Performed by: INTERNAL MEDICINE

## 2021-09-30 PROCEDURE — 90686 IIV4 VACC NO PRSV 0.5 ML IM: CPT | Mod: HCNC,S$GLB,, | Performed by: INTERNAL MEDICINE

## 2021-09-30 PROCEDURE — 1160F PR REVIEW ALL MEDS BY PRESCRIBER/CLIN PHARMACIST DOCUMENTED: ICD-10-PCS | Mod: HCNC,CPTII,S$GLB, | Performed by: INTERNAL MEDICINE

## 2021-09-30 PROCEDURE — 3080F PR MOST RECENT DIASTOLIC BLOOD PRESSURE >= 90 MM HG: ICD-10-PCS | Mod: HCNC,CPTII,S$GLB, | Performed by: INTERNAL MEDICINE

## 2021-09-30 PROCEDURE — 90686 FLU VACCINE (QUAD) GREATER THAN OR EQUAL TO 3YO PRESERVATIVE FREE IM: ICD-10-PCS | Mod: HCNC,S$GLB,, | Performed by: INTERNAL MEDICINE

## 2021-09-30 PROCEDURE — G0008 FLU VACCINE (QUAD) GREATER THAN OR EQUAL TO 3YO PRESERVATIVE FREE IM: ICD-10-PCS | Mod: HCNC,S$GLB,, | Performed by: INTERNAL MEDICINE

## 2021-09-30 PROCEDURE — 71046 XR CHEST PA AND LATERAL: ICD-10-PCS | Mod: 26,HCNC,, | Performed by: RADIOLOGY

## 2021-09-30 PROCEDURE — 71046 X-RAY EXAM CHEST 2 VIEWS: CPT | Mod: TC,HCNC

## 2021-09-30 PROCEDURE — 99999 PR PBB SHADOW E&M-EST. PATIENT-LVL IV: CPT | Mod: PBBFAC,HCNC,, | Performed by: INTERNAL MEDICINE

## 2021-09-30 PROCEDURE — 99214 PR OFFICE/OUTPT VISIT, EST, LEVL IV, 30-39 MIN: ICD-10-PCS | Mod: 25,HCNC,S$GLB, | Performed by: INTERNAL MEDICINE

## 2021-09-30 PROCEDURE — 3080F DIAST BP >= 90 MM HG: CPT | Mod: HCNC,CPTII,S$GLB, | Performed by: INTERNAL MEDICINE

## 2021-09-30 RX ORDER — NIFEDIPINE 30 MG/1
30 TABLET, EXTENDED RELEASE ORAL DAILY
Qty: 30 TABLET | Refills: 2 | Status: SHIPPED | OUTPATIENT
Start: 2021-09-30 | End: 2021-12-16 | Stop reason: SDUPTHER

## 2021-09-30 RX ORDER — AMOXICILLIN AND CLAVULANATE POTASSIUM 875; 125 MG/1; MG/1
1 TABLET, FILM COATED ORAL EVERY 12 HOURS
Qty: 20 TABLET | Refills: 0 | Status: SHIPPED | OUTPATIENT
Start: 2021-09-30 | End: 2021-10-10

## 2021-10-01 ENCOUNTER — HOSPITAL ENCOUNTER (OUTPATIENT)
Dept: RADIOLOGY | Facility: HOSPITAL | Age: 61
Discharge: HOME | End: 2021-10-01
Attending: INTERNAL MEDICINE
Payer: MEDICARE

## 2021-10-01 VITALS — BODY MASS INDEX: 28.49 KG/M2 | WEIGHT: 171 LBS | HEIGHT: 65 IN

## 2021-10-01 DIAGNOSIS — N63.10 LUMP OF BREAST, RIGHT: ICD-10-CM

## 2021-10-01 DIAGNOSIS — N63.10 BREAST MASS, RIGHT: ICD-10-CM

## 2021-10-01 DIAGNOSIS — Z85.3 HISTORY OF BREAST CANCER: ICD-10-CM

## 2021-10-01 PROCEDURE — 77065 DX MAMMO INCL CAD UNI: CPT | Mod: 26,HCNC,RT, | Performed by: RADIOLOGY

## 2021-10-01 PROCEDURE — 76642 US BREAST RIGHT LIMITED: ICD-10-PCS | Mod: 26,HCNC,RT, | Performed by: RADIOLOGY

## 2021-10-01 PROCEDURE — 77061 BREAST TOMOSYNTHESIS UNI: CPT | Mod: TC,HCNC,RT

## 2021-10-01 PROCEDURE — 76642 ULTRASOUND BREAST LIMITED: CPT | Mod: TC,HCNC,RT

## 2021-10-01 PROCEDURE — 77061 MAMMO DIGITAL DIAGNOSTIC RIGHT WITH TOMO: ICD-10-PCS | Mod: 26,HCNC,RT, | Performed by: RADIOLOGY

## 2021-10-01 PROCEDURE — 76642 ULTRASOUND BREAST LIMITED: CPT | Mod: 26,HCNC,RT, | Performed by: RADIOLOGY

## 2021-10-01 PROCEDURE — 77065 MAMMO DIGITAL DIAGNOSTIC RIGHT WITH TOMO: ICD-10-PCS | Mod: 26,HCNC,RT, | Performed by: RADIOLOGY

## 2021-10-01 PROCEDURE — 77061 BREAST TOMOSYNTHESIS UNI: CPT | Mod: 26,HCNC,RT, | Performed by: RADIOLOGY

## 2021-10-07 ENCOUNTER — OFFICE VISIT (OUTPATIENT)
Dept: RHEUMATOLOGY | Facility: CLINIC | Age: 61
End: 2021-10-07
Payer: MEDICARE

## 2021-10-07 ENCOUNTER — HOSPITAL ENCOUNTER (OUTPATIENT)
Dept: RADIOLOGY | Facility: HOSPITAL | Age: 61
Discharge: HOME | End: 2021-10-07
Attending: INTERNAL MEDICINE
Payer: MEDICARE

## 2021-10-07 VITALS
HEART RATE: 99 BPM | DIASTOLIC BLOOD PRESSURE: 83 MMHG | BODY MASS INDEX: 29.5 KG/M2 | WEIGHT: 177.25 LBS | SYSTOLIC BLOOD PRESSURE: 146 MMHG

## 2021-10-07 DIAGNOSIS — M79.672 LEFT FOOT PAIN: Primary | ICD-10-CM

## 2021-10-07 DIAGNOSIS — M79.672 LEFT FOOT PAIN: ICD-10-CM

## 2021-10-07 DIAGNOSIS — M33.13 DERMATOMYOSITIS: ICD-10-CM

## 2021-10-07 PROCEDURE — 73630 XR FOOT COMPLETE 3 VIEW LEFT: ICD-10-PCS | Mod: 26,HCNC,LT, | Performed by: RADIOLOGY

## 2021-10-07 PROCEDURE — 3079F PR MOST RECENT DIASTOLIC BLOOD PRESSURE 80-89 MM HG: ICD-10-PCS | Mod: HCNC,CPTII,S$GLB, | Performed by: INTERNAL MEDICINE

## 2021-10-07 PROCEDURE — 1160F RVW MEDS BY RX/DR IN RCRD: CPT | Mod: HCNC,CPTII,S$GLB, | Performed by: INTERNAL MEDICINE

## 2021-10-07 PROCEDURE — 3077F PR MOST RECENT SYSTOLIC BLOOD PRESSURE >= 140 MM HG: ICD-10-PCS | Mod: HCNC,CPTII,S$GLB, | Performed by: INTERNAL MEDICINE

## 2021-10-07 PROCEDURE — 73630 X-RAY EXAM OF FOOT: CPT | Mod: TC,HCNC,LT

## 2021-10-07 PROCEDURE — 3008F PR BODY MASS INDEX (BMI) DOCUMENTED: ICD-10-PCS | Mod: HCNC,CPTII,S$GLB, | Performed by: INTERNAL MEDICINE

## 2021-10-07 PROCEDURE — 99999 PR PBB SHADOW E&M-EST. PATIENT-LVL IV: CPT | Mod: PBBFAC,HCNC,, | Performed by: INTERNAL MEDICINE

## 2021-10-07 PROCEDURE — 99499 UNLISTED E&M SERVICE: CPT | Mod: S$GLB,,, | Performed by: INTERNAL MEDICINE

## 2021-10-07 PROCEDURE — 3008F BODY MASS INDEX DOCD: CPT | Mod: HCNC,CPTII,S$GLB, | Performed by: INTERNAL MEDICINE

## 2021-10-07 PROCEDURE — 99999 PR PBB SHADOW E&M-EST. PATIENT-LVL IV: ICD-10-PCS | Mod: PBBFAC,HCNC,, | Performed by: INTERNAL MEDICINE

## 2021-10-07 PROCEDURE — 99214 OFFICE O/P EST MOD 30 MIN: CPT | Mod: HCNC,S$GLB,, | Performed by: INTERNAL MEDICINE

## 2021-10-07 PROCEDURE — 99499 RISK ADDL DX/OHS AUDIT: ICD-10-PCS | Mod: S$GLB,,, | Performed by: INTERNAL MEDICINE

## 2021-10-07 PROCEDURE — 99214 PR OFFICE/OUTPT VISIT, EST, LEVL IV, 30-39 MIN: ICD-10-PCS | Mod: HCNC,S$GLB,, | Performed by: INTERNAL MEDICINE

## 2021-10-07 PROCEDURE — 73630 X-RAY EXAM OF FOOT: CPT | Mod: 26,HCNC,LT, | Performed by: RADIOLOGY

## 2021-10-07 PROCEDURE — 3077F SYST BP >= 140 MM HG: CPT | Mod: HCNC,CPTII,S$GLB, | Performed by: INTERNAL MEDICINE

## 2021-10-07 PROCEDURE — 3079F DIAST BP 80-89 MM HG: CPT | Mod: HCNC,CPTII,S$GLB, | Performed by: INTERNAL MEDICINE

## 2021-10-07 PROCEDURE — 1160F PR REVIEW ALL MEDS BY PRESCRIBER/CLIN PHARMACIST DOCUMENTED: ICD-10-PCS | Mod: HCNC,CPTII,S$GLB, | Performed by: INTERNAL MEDICINE

## 2021-10-07 PROCEDURE — 1159F PR MEDICATION LIST DOCUMENTED IN MEDICAL RECORD: ICD-10-PCS | Mod: HCNC,CPTII,S$GLB, | Performed by: INTERNAL MEDICINE

## 2021-10-07 PROCEDURE — 1159F MED LIST DOCD IN RCRD: CPT | Mod: HCNC,CPTII,S$GLB, | Performed by: INTERNAL MEDICINE

## 2021-10-07 RX ORDER — TOFACITINIB 5 MG/1
TABLET, FILM COATED ORAL
Qty: 45 TABLET | Refills: 11 | Status: SHIPPED | OUTPATIENT
Start: 2021-10-07 | End: 2022-01-31

## 2021-10-14 ENCOUNTER — OFFICE VISIT (OUTPATIENT)
Dept: PODIATRY | Facility: CLINIC | Age: 61
End: 2021-10-14
Payer: MEDICARE

## 2021-10-14 VITALS
HEART RATE: 99 BPM | WEIGHT: 177.69 LBS | BODY MASS INDEX: 29.61 KG/M2 | HEIGHT: 65 IN | DIASTOLIC BLOOD PRESSURE: 84 MMHG | SYSTOLIC BLOOD PRESSURE: 141 MMHG

## 2021-10-14 DIAGNOSIS — M25.872 ANKLE IMPINGEMENT SYNDROME, LEFT: ICD-10-CM

## 2021-10-14 DIAGNOSIS — M76.822 POSTERIOR TIBIAL TENDON DYSFUNCTION (PTTD) OF LEFT LOWER EXTREMITY: Primary | ICD-10-CM

## 2021-10-14 PROCEDURE — 1160F PR REVIEW ALL MEDS BY PRESCRIBER/CLIN PHARMACIST DOCUMENTED: ICD-10-PCS | Mod: HCNC,CPTII,S$GLB, | Performed by: PODIATRIST

## 2021-10-14 PROCEDURE — 99204 OFFICE O/P NEW MOD 45 MIN: CPT | Mod: HCNC,S$GLB,, | Performed by: PODIATRIST

## 2021-10-14 PROCEDURE — 3008F PR BODY MASS INDEX (BMI) DOCUMENTED: ICD-10-PCS | Mod: HCNC,CPTII,S$GLB, | Performed by: PODIATRIST

## 2021-10-14 PROCEDURE — 99204 PR OFFICE/OUTPT VISIT, NEW, LEVL IV, 45-59 MIN: ICD-10-PCS | Mod: HCNC,S$GLB,, | Performed by: PODIATRIST

## 2021-10-14 PROCEDURE — 1160F RVW MEDS BY RX/DR IN RCRD: CPT | Mod: HCNC,CPTII,S$GLB, | Performed by: PODIATRIST

## 2021-10-14 PROCEDURE — 3077F PR MOST RECENT SYSTOLIC BLOOD PRESSURE >= 140 MM HG: ICD-10-PCS | Mod: HCNC,CPTII,S$GLB, | Performed by: PODIATRIST

## 2021-10-14 PROCEDURE — 3079F DIAST BP 80-89 MM HG: CPT | Mod: HCNC,CPTII,S$GLB, | Performed by: PODIATRIST

## 2021-10-14 PROCEDURE — 99999 PR PBB SHADOW E&M-EST. PATIENT-LVL IV: ICD-10-PCS | Mod: PBBFAC,HCNC,, | Performed by: PODIATRIST

## 2021-10-14 PROCEDURE — 3077F SYST BP >= 140 MM HG: CPT | Mod: HCNC,CPTII,S$GLB, | Performed by: PODIATRIST

## 2021-10-14 PROCEDURE — 1159F PR MEDICATION LIST DOCUMENTED IN MEDICAL RECORD: ICD-10-PCS | Mod: HCNC,CPTII,S$GLB, | Performed by: PODIATRIST

## 2021-10-14 PROCEDURE — 3008F BODY MASS INDEX DOCD: CPT | Mod: HCNC,CPTII,S$GLB, | Performed by: PODIATRIST

## 2021-10-14 PROCEDURE — 3079F PR MOST RECENT DIASTOLIC BLOOD PRESSURE 80-89 MM HG: ICD-10-PCS | Mod: HCNC,CPTII,S$GLB, | Performed by: PODIATRIST

## 2021-10-14 PROCEDURE — 99999 PR PBB SHADOW E&M-EST. PATIENT-LVL IV: CPT | Mod: PBBFAC,HCNC,, | Performed by: PODIATRIST

## 2021-10-14 PROCEDURE — 1159F MED LIST DOCD IN RCRD: CPT | Mod: HCNC,CPTII,S$GLB, | Performed by: PODIATRIST

## 2021-10-14 RX ORDER — DICLOFENAC SODIUM 10 MG/G
2 GEL TOPICAL 4 TIMES DAILY
Qty: 100 G | Refills: 3 | Status: SHIPPED | OUTPATIENT
Start: 2021-10-14 | End: 2023-08-27

## 2021-10-16 ENCOUNTER — SPECIALTY PHARMACY (OUTPATIENT)
Dept: PHARMACY | Facility: CLINIC | Age: 61
End: 2021-10-16

## 2021-10-21 ENCOUNTER — INFUSION (OUTPATIENT)
Dept: INFECTIOUS DISEASES | Facility: HOSPITAL | Age: 61
End: 2021-10-21
Attending: INTERNAL MEDICINE
Payer: MEDICARE

## 2021-10-21 VITALS
RESPIRATION RATE: 18 BRPM | OXYGEN SATURATION: 100 % | WEIGHT: 174.06 LBS | HEART RATE: 100 BPM | HEIGHT: 65 IN | BODY MASS INDEX: 29 KG/M2 | TEMPERATURE: 98 F | SYSTOLIC BLOOD PRESSURE: 169 MMHG | DIASTOLIC BLOOD PRESSURE: 91 MMHG

## 2021-10-21 DIAGNOSIS — R13.19 ESOPHAGEAL DYSPHAGIA: ICD-10-CM

## 2021-10-21 DIAGNOSIS — M33.13 DERMATOMYOSITIS: Primary | ICD-10-CM

## 2021-10-21 PROCEDURE — 63600175 PHARM REV CODE 636 W HCPCS: Mod: HCNC | Performed by: INTERNAL MEDICINE

## 2021-10-21 PROCEDURE — 96375 TX/PRO/DX INJ NEW DRUG ADDON: CPT | Mod: HCNC

## 2021-10-21 PROCEDURE — 25000003 PHARM REV CODE 250: Mod: HCNC | Performed by: INTERNAL MEDICINE

## 2021-10-21 PROCEDURE — 96365 THER/PROPH/DIAG IV INF INIT: CPT | Mod: HCNC

## 2021-10-21 PROCEDURE — 96367 TX/PROPH/DG ADDL SEQ IV INF: CPT | Mod: HCNC

## 2021-10-21 PROCEDURE — 96366 THER/PROPH/DIAG IV INF ADDON: CPT | Mod: HCNC

## 2021-10-21 RX ORDER — ACETAMINOPHEN 325 MG/1
650 TABLET ORAL
Status: COMPLETED | OUTPATIENT
Start: 2021-10-21 | End: 2021-10-21

## 2021-10-21 RX ORDER — FAMOTIDINE 10 MG/ML
20 INJECTION INTRAVENOUS
Status: COMPLETED | OUTPATIENT
Start: 2021-10-21 | End: 2021-10-21

## 2021-10-21 RX ORDER — HEPARIN 100 UNIT/ML
500 SYRINGE INTRAVENOUS
Status: CANCELLED | OUTPATIENT
Start: 2021-11-18

## 2021-10-21 RX ORDER — SODIUM CHLORIDE 0.9 % (FLUSH) 0.9 %
10 SYRINGE (ML) INJECTION
Status: DISCONTINUED | OUTPATIENT
Start: 2021-10-21 | End: 2021-10-21 | Stop reason: HOSPADM

## 2021-10-21 RX ORDER — FAMOTIDINE 10 MG/ML
20 INJECTION INTRAVENOUS
Status: CANCELLED | OUTPATIENT
Start: 2021-11-18

## 2021-10-21 RX ORDER — ACETAMINOPHEN 325 MG/1
650 TABLET ORAL
Status: CANCELLED | OUTPATIENT
Start: 2021-11-18

## 2021-10-21 RX ORDER — SODIUM CHLORIDE 0.9 % (FLUSH) 0.9 %
10 SYRINGE (ML) INJECTION
Status: CANCELLED | OUTPATIENT
Start: 2021-11-18

## 2021-10-21 RX ADMIN — FAMOTIDINE 20 MG: 10 INJECTION INTRAVENOUS at 09:10

## 2021-10-21 RX ADMIN — HUMAN IMMUNOGLOBULIN G 80 G: 40 LIQUID INTRAVENOUS at 09:10

## 2021-10-21 RX ADMIN — DIPHENHYDRAMINE HYDROCHLORIDE 50 MG: 50 INJECTION INTRAMUSCULAR; INTRAVENOUS at 09:10

## 2021-10-21 RX ADMIN — ACETAMINOPHEN 650 MG: 325 TABLET ORAL at 09:10

## 2021-10-21 RX ADMIN — SODIUM CHLORIDE: 0.9 INJECTION, SOLUTION INTRAVENOUS at 09:10

## 2021-10-22 ENCOUNTER — INFUSION (OUTPATIENT)
Dept: INFECTIOUS DISEASES | Facility: HOSPITAL | Age: 61
End: 2021-10-22
Attending: INTERNAL MEDICINE
Payer: MEDICARE

## 2021-10-22 VITALS
HEIGHT: 65 IN | HEART RATE: 102 BPM | SYSTOLIC BLOOD PRESSURE: 164 MMHG | OXYGEN SATURATION: 99 % | TEMPERATURE: 98 F | RESPIRATION RATE: 17 BRPM | WEIGHT: 173.38 LBS | DIASTOLIC BLOOD PRESSURE: 93 MMHG | BODY MASS INDEX: 28.89 KG/M2

## 2021-10-22 DIAGNOSIS — R13.19 ESOPHAGEAL DYSPHAGIA: ICD-10-CM

## 2021-10-22 DIAGNOSIS — M33.13 DERMATOMYOSITIS: Primary | ICD-10-CM

## 2021-10-22 PROCEDURE — 96365 THER/PROPH/DIAG IV INF INIT: CPT | Mod: HCNC

## 2021-10-22 PROCEDURE — 63600175 PHARM REV CODE 636 W HCPCS: Mod: HCNC | Performed by: INTERNAL MEDICINE

## 2021-10-22 PROCEDURE — 96375 TX/PRO/DX INJ NEW DRUG ADDON: CPT | Mod: HCNC

## 2021-10-22 PROCEDURE — 96366 THER/PROPH/DIAG IV INF ADDON: CPT | Mod: HCNC

## 2021-10-22 PROCEDURE — 25000003 PHARM REV CODE 250: Mod: HCNC | Performed by: INTERNAL MEDICINE

## 2021-10-22 PROCEDURE — 96367 TX/PROPH/DG ADDL SEQ IV INF: CPT | Mod: HCNC

## 2021-10-22 RX ORDER — SODIUM CHLORIDE 0.9 % (FLUSH) 0.9 %
10 SYRINGE (ML) INJECTION
Status: CANCELLED | OUTPATIENT
Start: 2021-11-19

## 2021-10-22 RX ORDER — HEPARIN 100 UNIT/ML
500 SYRINGE INTRAVENOUS
Status: CANCELLED | OUTPATIENT
Start: 2021-11-19

## 2021-10-22 RX ORDER — SODIUM CHLORIDE 0.9 % (FLUSH) 0.9 %
10 SYRINGE (ML) INJECTION
Status: DISCONTINUED | OUTPATIENT
Start: 2021-10-22 | End: 2021-10-22 | Stop reason: HOSPADM

## 2021-10-22 RX ORDER — ACETAMINOPHEN 325 MG/1
650 TABLET ORAL
Status: COMPLETED | OUTPATIENT
Start: 2021-10-22 | End: 2021-10-22

## 2021-10-22 RX ORDER — FAMOTIDINE 10 MG/ML
20 INJECTION INTRAVENOUS
Status: COMPLETED | OUTPATIENT
Start: 2021-10-22 | End: 2021-10-22

## 2021-10-22 RX ORDER — FAMOTIDINE 10 MG/ML
20 INJECTION INTRAVENOUS
Status: CANCELLED | OUTPATIENT
Start: 2021-11-19

## 2021-10-22 RX ORDER — ACETAMINOPHEN 325 MG/1
650 TABLET ORAL
Status: CANCELLED | OUTPATIENT
Start: 2021-11-19

## 2021-10-22 RX ADMIN — SODIUM CHLORIDE: 0.9 INJECTION, SOLUTION INTRAVENOUS at 08:10

## 2021-10-22 RX ADMIN — FAMOTIDINE 20 MG: 10 INJECTION INTRAVENOUS at 08:10

## 2021-10-22 RX ADMIN — DIPHENHYDRAMINE HYDROCHLORIDE 50 MG: 50 INJECTION INTRAMUSCULAR; INTRAVENOUS at 09:10

## 2021-10-22 RX ADMIN — HUMAN IMMUNOGLOBULIN G 80 G: 20 LIQUID INTRAVENOUS at 09:10

## 2021-10-22 RX ADMIN — ACETAMINOPHEN 650 MG: 325 TABLET ORAL at 08:10

## 2021-10-26 ENCOUNTER — SPECIALTY PHARMACY (OUTPATIENT)
Dept: PHARMACY | Facility: CLINIC | Age: 61
End: 2021-10-26
Payer: COMMERCIAL

## 2021-11-17 ENCOUNTER — INFUSION (OUTPATIENT)
Dept: INFECTIOUS DISEASES | Facility: HOSPITAL | Age: 61
End: 2021-11-17
Attending: INTERNAL MEDICINE
Payer: MEDICARE

## 2021-11-17 VITALS — BODY MASS INDEX: 29.57 KG/M2 | WEIGHT: 177.5 LBS | HEIGHT: 65 IN

## 2021-11-17 DIAGNOSIS — M33.13 DERMATOMYOSITIS: Primary | ICD-10-CM

## 2021-11-17 DIAGNOSIS — R13.19 ESOPHAGEAL DYSPHAGIA: ICD-10-CM

## 2021-11-17 PROCEDURE — 96375 TX/PRO/DX INJ NEW DRUG ADDON: CPT | Mod: HCNC

## 2021-11-17 PROCEDURE — 96367 TX/PROPH/DG ADDL SEQ IV INF: CPT | Mod: HCNC

## 2021-11-17 PROCEDURE — 96365 THER/PROPH/DIAG IV INF INIT: CPT | Mod: HCNC

## 2021-11-17 PROCEDURE — 96366 THER/PROPH/DIAG IV INF ADDON: CPT | Mod: HCNC

## 2021-11-17 PROCEDURE — 25000003 PHARM REV CODE 250: Mod: HCNC | Performed by: INTERNAL MEDICINE

## 2021-11-17 PROCEDURE — 63600175 PHARM REV CODE 636 W HCPCS: Mod: JG,HCNC | Performed by: INTERNAL MEDICINE

## 2021-11-17 RX ORDER — FAMOTIDINE 10 MG/ML
20 INJECTION INTRAVENOUS
Status: CANCELLED | OUTPATIENT
Start: 2021-12-15

## 2021-11-17 RX ORDER — ACETAMINOPHEN 325 MG/1
650 TABLET ORAL
Status: COMPLETED | OUTPATIENT
Start: 2021-11-17 | End: 2021-11-17

## 2021-11-17 RX ORDER — SODIUM CHLORIDE 0.9 % (FLUSH) 0.9 %
10 SYRINGE (ML) INJECTION
Status: CANCELLED | OUTPATIENT
Start: 2021-12-15

## 2021-11-17 RX ORDER — FAMOTIDINE 10 MG/ML
20 INJECTION INTRAVENOUS
Status: COMPLETED | OUTPATIENT
Start: 2021-11-17 | End: 2021-11-17

## 2021-11-17 RX ORDER — ACETAMINOPHEN 325 MG/1
650 TABLET ORAL
Status: CANCELLED | OUTPATIENT
Start: 2021-12-15

## 2021-11-17 RX ORDER — HEPARIN 100 UNIT/ML
500 SYRINGE INTRAVENOUS
Status: CANCELLED | OUTPATIENT
Start: 2021-12-15

## 2021-11-17 RX ORDER — SODIUM CHLORIDE 0.9 % (FLUSH) 0.9 %
10 SYRINGE (ML) INJECTION
Status: DISCONTINUED | OUTPATIENT
Start: 2021-11-17 | End: 2021-11-17 | Stop reason: HOSPADM

## 2021-11-17 RX ADMIN — DIPHENHYDRAMINE HYDROCHLORIDE 50 MG: 50 INJECTION INTRAMUSCULAR; INTRAVENOUS at 09:11

## 2021-11-17 RX ADMIN — ACETAMINOPHEN 650 MG: 325 TABLET ORAL at 09:11

## 2021-11-17 RX ADMIN — FAMOTIDINE 20 MG: 10 INJECTION INTRAVENOUS at 09:11

## 2021-11-17 RX ADMIN — SODIUM CHLORIDE: 0.9 INJECTION, SOLUTION INTRAVENOUS at 09:11

## 2021-11-17 RX ADMIN — HUMAN IMMUNOGLOBULIN G 80 G: 40 LIQUID INTRAVENOUS at 09:11

## 2021-11-18 ENCOUNTER — INFUSION (OUTPATIENT)
Dept: INFECTIOUS DISEASES | Facility: HOSPITAL | Age: 61
End: 2021-11-18
Attending: INTERNAL MEDICINE
Payer: MEDICARE

## 2021-11-18 VITALS
HEART RATE: 105 BPM | TEMPERATURE: 98 F | SYSTOLIC BLOOD PRESSURE: 153 MMHG | DIASTOLIC BLOOD PRESSURE: 90 MMHG | RESPIRATION RATE: 16 BRPM | BODY MASS INDEX: 29 KG/M2 | OXYGEN SATURATION: 100 % | WEIGHT: 174.25 LBS

## 2021-11-18 DIAGNOSIS — R13.19 ESOPHAGEAL DYSPHAGIA: ICD-10-CM

## 2021-11-18 DIAGNOSIS — M33.13 DERMATOMYOSITIS: Primary | ICD-10-CM

## 2021-11-18 PROCEDURE — 96365 THER/PROPH/DIAG IV INF INIT: CPT

## 2021-11-18 PROCEDURE — 25000003 PHARM REV CODE 250: Mod: HCNC | Performed by: INTERNAL MEDICINE

## 2021-11-18 PROCEDURE — 96367 TX/PROPH/DG ADDL SEQ IV INF: CPT | Mod: HCNC

## 2021-11-18 PROCEDURE — 96366 THER/PROPH/DIAG IV INF ADDON: CPT | Mod: HCNC

## 2021-11-18 PROCEDURE — 96375 TX/PRO/DX INJ NEW DRUG ADDON: CPT | Mod: HCNC

## 2021-11-18 PROCEDURE — 63600175 PHARM REV CODE 636 W HCPCS: Mod: HCNC | Performed by: INTERNAL MEDICINE

## 2021-11-18 RX ORDER — ACETAMINOPHEN 325 MG/1
650 TABLET ORAL
Status: CANCELLED | OUTPATIENT
Start: 2021-12-16

## 2021-11-18 RX ORDER — FAMOTIDINE 10 MG/ML
20 INJECTION INTRAVENOUS
Status: CANCELLED | OUTPATIENT
Start: 2021-12-16

## 2021-11-18 RX ORDER — FAMOTIDINE 10 MG/ML
20 INJECTION INTRAVENOUS
Status: COMPLETED | OUTPATIENT
Start: 2021-11-18 | End: 2021-11-18

## 2021-11-18 RX ORDER — HEPARIN 100 UNIT/ML
500 SYRINGE INTRAVENOUS
Status: CANCELLED | OUTPATIENT
Start: 2021-12-16

## 2021-11-18 RX ORDER — SODIUM CHLORIDE 0.9 % (FLUSH) 0.9 %
10 SYRINGE (ML) INJECTION
Status: CANCELLED | OUTPATIENT
Start: 2021-12-16

## 2021-11-18 RX ORDER — SODIUM CHLORIDE 0.9 % (FLUSH) 0.9 %
10 SYRINGE (ML) INJECTION
Status: DISCONTINUED | OUTPATIENT
Start: 2021-11-18 | End: 2021-11-18 | Stop reason: HOSPADM

## 2021-11-18 RX ORDER — ACETAMINOPHEN 325 MG/1
650 TABLET ORAL
Status: COMPLETED | OUTPATIENT
Start: 2021-11-18 | End: 2021-11-18

## 2021-11-18 RX ORDER — HEPARIN 100 UNIT/ML
500 SYRINGE INTRAVENOUS
Status: DISCONTINUED | OUTPATIENT
Start: 2021-11-18 | End: 2021-11-18 | Stop reason: HOSPADM

## 2021-11-18 RX ADMIN — SODIUM CHLORIDE: 0.9 INJECTION, SOLUTION INTRAVENOUS at 09:11

## 2021-11-18 RX ADMIN — FAMOTIDINE 20 MG: 10 INJECTION INTRAVENOUS at 08:11

## 2021-11-18 RX ADMIN — HUMAN IMMUNOGLOBULIN G 80 G: 40 LIQUID INTRAVENOUS at 09:11

## 2021-11-18 RX ADMIN — ACETAMINOPHEN 650 MG: 325 TABLET ORAL at 08:11

## 2021-11-18 RX ADMIN — DIPHENHYDRAMINE HYDROCHLORIDE 50 MG: 50 INJECTION INTRAMUSCULAR; INTRAVENOUS at 09:11

## 2021-11-30 ENCOUNTER — PATIENT MESSAGE (OUTPATIENT)
Dept: HEMATOLOGY/ONCOLOGY | Facility: CLINIC | Age: 61
End: 2021-11-30
Payer: MEDICARE

## 2021-11-30 ENCOUNTER — OFFICE VISIT (OUTPATIENT)
Dept: HEMATOLOGY/ONCOLOGY | Facility: CLINIC | Age: 61
End: 2021-11-30
Payer: MEDICARE

## 2021-11-30 VITALS
HEART RATE: 103 BPM | BODY MASS INDEX: 30.08 KG/M2 | RESPIRATION RATE: 16 BRPM | HEIGHT: 65 IN | WEIGHT: 180.56 LBS | OXYGEN SATURATION: 98 % | SYSTOLIC BLOOD PRESSURE: 161 MMHG | DIASTOLIC BLOOD PRESSURE: 92 MMHG

## 2021-11-30 DIAGNOSIS — Z17.1 MALIGNANT NEOPLASM OF UPPER-OUTER QUADRANT OF LEFT BREAST IN FEMALE, ESTROGEN RECEPTOR NEGATIVE: Primary | ICD-10-CM

## 2021-11-30 DIAGNOSIS — M33.20 POLYMYOSITIS ASSOCIATED WITH AUTOIMMUNE DISEASE: ICD-10-CM

## 2021-11-30 DIAGNOSIS — M35.9 POLYMYOSITIS ASSOCIATED WITH AUTOIMMUNE DISEASE: ICD-10-CM

## 2021-11-30 DIAGNOSIS — C50.412 MALIGNANT NEOPLASM OF UPPER-OUTER QUADRANT OF LEFT BREAST IN FEMALE, ESTROGEN RECEPTOR NEGATIVE: Primary | ICD-10-CM

## 2021-11-30 PROCEDURE — 99499 RISK ADDL DX/OHS AUDIT: ICD-10-PCS | Mod: S$GLB,,, | Performed by: INTERNAL MEDICINE

## 2021-11-30 PROCEDURE — 99213 OFFICE O/P EST LOW 20 MIN: CPT | Mod: S$PBB,HCNC,, | Performed by: INTERNAL MEDICINE

## 2021-11-30 PROCEDURE — 99499 UNLISTED E&M SERVICE: CPT | Mod: S$GLB,,, | Performed by: INTERNAL MEDICINE

## 2021-11-30 PROCEDURE — 99213 PR OFFICE/OUTPT VISIT, EST, LEVL III, 20-29 MIN: ICD-10-PCS | Mod: S$PBB,HCNC,, | Performed by: INTERNAL MEDICINE

## 2021-11-30 PROCEDURE — 99999 PR PBB SHADOW E&M-EST. PATIENT-LVL IV: ICD-10-PCS | Mod: PBBFAC,HCNC,, | Performed by: INTERNAL MEDICINE

## 2021-11-30 PROCEDURE — 99999 PR PBB SHADOW E&M-EST. PATIENT-LVL IV: CPT | Mod: PBBFAC,HCNC,, | Performed by: INTERNAL MEDICINE

## 2021-12-02 ENCOUNTER — SPECIALTY PHARMACY (OUTPATIENT)
Dept: PHARMACY | Facility: CLINIC | Age: 61
End: 2021-12-02

## 2021-12-02 ENCOUNTER — PATIENT MESSAGE (OUTPATIENT)
Dept: PHARMACY | Facility: CLINIC | Age: 61
End: 2021-12-02
Payer: MEDICARE

## 2021-12-02 NOTE — TELEPHONE ENCOUNTER
Incoming call from patient returning our call for refill. Patient is now insured through [employer] and Xeljanz will require a PA. Forwarding to assigned [facility] Katia Shrestha PharmD to submit a new PA. Patient reports an on hand qty of 7 days.

## 2021-12-02 NOTE — TELEPHONE ENCOUNTER
Pt has new [facility] Medicare insurance. Xeljanz PA submitted via [facility]- (Key: [id number])    Xeljanz denied due to off-label use. Urgent appeal faxed 12/3 for continuation of therapy

## 2021-12-08 ENCOUNTER — PATIENT MESSAGE (OUTPATIENT)
Dept: DERMATOLOGY | Facility: CLINIC | Age: 61
End: 2021-12-08

## 2021-12-08 ENCOUNTER — OFFICE VISIT (OUTPATIENT)
Dept: DERMATOLOGY | Facility: CLINIC | Age: 61
End: 2021-12-08
Payer: MEDICARE

## 2021-12-08 DIAGNOSIS — B35.3 TINEA PEDIS OF BOTH FEET: Primary | ICD-10-CM

## 2021-12-08 DIAGNOSIS — M33.13 DERMATOMYOSITIS: ICD-10-CM

## 2021-12-08 DIAGNOSIS — L94.4 GOTTRON'S PAPULES: ICD-10-CM

## 2021-12-08 PROCEDURE — 87220 TISSUE EXAM FOR FUNGI: CPT | Mod: HCNC,S$GLB,, | Performed by: DERMATOLOGY

## 2021-12-08 PROCEDURE — 99499 UNLISTED E&M SERVICE: CPT | Mod: S$PBB,HCNC,, | Performed by: DERMATOLOGY

## 2021-12-08 PROCEDURE — 99214 PR OFFICE/OUTPT VISIT, EST, LEVL IV, 30-39 MIN: ICD-10-PCS | Mod: 25,HCNC,S$GLB, | Performed by: DERMATOLOGY

## 2021-12-08 PROCEDURE — 99214 OFFICE O/P EST MOD 30 MIN: CPT | Mod: 25,HCNC,S$GLB, | Performed by: DERMATOLOGY

## 2021-12-08 PROCEDURE — 99999 PR PBB SHADOW E&M-EST. PATIENT-LVL III: CPT | Mod: PBBFAC,HCNC,, | Performed by: DERMATOLOGY

## 2021-12-08 PROCEDURE — 99499 RISK ADDL DX/OHS AUDIT: ICD-10-PCS | Mod: S$PBB,HCNC,, | Performed by: DERMATOLOGY

## 2021-12-08 PROCEDURE — 99999 PR PBB SHADOW E&M-EST. PATIENT-LVL III: ICD-10-PCS | Mod: PBBFAC,HCNC,, | Performed by: DERMATOLOGY

## 2021-12-08 PROCEDURE — 87220 PR  TISSUE EXAM BY KOH: ICD-10-PCS | Mod: HCNC,S$GLB,, | Performed by: DERMATOLOGY

## 2021-12-13 NOTE — TELEPHONE ENCOUNTER
Outgoing call made to [facility] Clincal Review Department to chack status of Xeljanz 5mg Appeal- [phone]. Appeal DENIED due to off-label use that not supported by Medicare plan. Requested that the denial letter be faxed to [phone], so that [facility] may proceed with external second level appeal.     Outgoing call to patient to inform her of Xeljanz Appeal denial. Patient states that she received a letter in the mail about the denial. Explained the option for external review. Once [facility] receives denial letter, will proceed with external review. Of note: Patient states that she will have a new plan in Jan 2022 if her current plan will not approve Xeljanz.     Katia Shrestha, PharmD  Clinical Pharmacist  [facility]   [phone]

## 2021-12-14 ENCOUNTER — IMMUNIZATION (OUTPATIENT)
Dept: PHARMACY | Facility: CLINIC | Age: 61
End: 2021-12-14
Payer: MEDICARE

## 2021-12-14 ENCOUNTER — OFFICE VISIT (OUTPATIENT)
Dept: PODIATRY | Facility: CLINIC | Age: 61
End: 2021-12-14
Payer: MEDICARE

## 2021-12-14 ENCOUNTER — TELEPHONE (OUTPATIENT)
Dept: INTERNAL MEDICINE | Facility: CLINIC | Age: 61
End: 2021-12-14

## 2021-12-14 VITALS
DIASTOLIC BLOOD PRESSURE: 85 MMHG | BODY MASS INDEX: 30.05 KG/M2 | WEIGHT: 180.56 LBS | HEART RATE: 97 BPM | SYSTOLIC BLOOD PRESSURE: 137 MMHG

## 2021-12-14 DIAGNOSIS — M54.42 CHRONIC LEFT-SIDED LOW BACK PAIN WITH LEFT-SIDED SCIATICA: ICD-10-CM

## 2021-12-14 DIAGNOSIS — G89.29 CHRONIC LEFT-SIDED LOW BACK PAIN WITH LEFT-SIDED SCIATICA: ICD-10-CM

## 2021-12-14 DIAGNOSIS — M25.872 ANKLE IMPINGEMENT SYNDROME, LEFT: Primary | ICD-10-CM

## 2021-12-14 DIAGNOSIS — Z23 NEED FOR VACCINATION: Primary | ICD-10-CM

## 2021-12-14 PROCEDURE — 99999 PR PBB SHADOW E&M-EST. PATIENT-LVL IV: ICD-10-PCS | Mod: PBBFAC,HCNC,, | Performed by: PODIATRIST

## 2021-12-14 PROCEDURE — 99999 PR PBB SHADOW E&M-EST. PATIENT-LVL IV: CPT | Mod: PBBFAC,HCNC,, | Performed by: PODIATRIST

## 2021-12-14 PROCEDURE — 99213 OFFICE O/P EST LOW 20 MIN: CPT | Mod: HCNC,S$GLB,, | Performed by: PODIATRIST

## 2021-12-14 PROCEDURE — 99213 PR OFFICE/OUTPT VISIT, EST, LEVL III, 20-29 MIN: ICD-10-PCS | Mod: HCNC,S$GLB,, | Performed by: PODIATRIST

## 2021-12-16 ENCOUNTER — OFFICE VISIT (OUTPATIENT)
Dept: INTERNAL MEDICINE | Facility: CLINIC | Age: 61
End: 2021-12-16
Payer: MEDICARE

## 2021-12-16 VITALS
HEART RATE: 88 BPM | WEIGHT: 178.56 LBS | TEMPERATURE: 98 F | BODY MASS INDEX: 29.75 KG/M2 | OXYGEN SATURATION: 99 % | DIASTOLIC BLOOD PRESSURE: 86 MMHG | SYSTOLIC BLOOD PRESSURE: 130 MMHG | HEIGHT: 65 IN

## 2021-12-16 DIAGNOSIS — M33.13 DERMATOMYOSITIS: Primary | ICD-10-CM

## 2021-12-16 DIAGNOSIS — Z17.1 MALIGNANT NEOPLASM OF UPPER-OUTER QUADRANT OF LEFT BREAST IN FEMALE, ESTROGEN RECEPTOR NEGATIVE: ICD-10-CM

## 2021-12-16 DIAGNOSIS — T45.1X5A CHEMOTHERAPY-INDUCED NEUROPATHY: ICD-10-CM

## 2021-12-16 DIAGNOSIS — M54.32 LEFT SIDED SCIATICA: ICD-10-CM

## 2021-12-16 DIAGNOSIS — I10 BENIGN ESSENTIAL HYPERTENSION: ICD-10-CM

## 2021-12-16 DIAGNOSIS — G62.0 CHEMOTHERAPY-INDUCED NEUROPATHY: ICD-10-CM

## 2021-12-16 DIAGNOSIS — C50.412 MALIGNANT NEOPLASM OF UPPER-OUTER QUADRANT OF LEFT BREAST IN FEMALE, ESTROGEN RECEPTOR NEGATIVE: ICD-10-CM

## 2021-12-16 PROCEDURE — 99214 PR OFFICE/OUTPT VISIT, EST, LEVL IV, 30-39 MIN: ICD-10-PCS | Mod: HCNC,S$GLB,, | Performed by: INTERNAL MEDICINE

## 2021-12-16 PROCEDURE — 99499 RISK ADDL DX/OHS AUDIT: ICD-10-PCS | Mod: S$PBB,,, | Performed by: INTERNAL MEDICINE

## 2021-12-16 PROCEDURE — 99499 UNLISTED E&M SERVICE: CPT | Mod: S$PBB,,, | Performed by: INTERNAL MEDICINE

## 2021-12-16 PROCEDURE — 99999 PR PBB SHADOW E&M-EST. PATIENT-LVL V: CPT | Mod: PBBFAC,HCNC,, | Performed by: INTERNAL MEDICINE

## 2021-12-16 PROCEDURE — 99999 PR PBB SHADOW E&M-EST. PATIENT-LVL V: ICD-10-PCS | Mod: PBBFAC,HCNC,, | Performed by: INTERNAL MEDICINE

## 2021-12-16 PROCEDURE — 99214 OFFICE O/P EST MOD 30 MIN: CPT | Mod: HCNC,S$GLB,, | Performed by: INTERNAL MEDICINE

## 2021-12-16 RX ORDER — NIFEDIPINE 30 MG/1
30 TABLET, EXTENDED RELEASE ORAL DAILY
Qty: 90 TABLET | Refills: 3 | Status: SHIPPED | OUTPATIENT
Start: 2021-12-16 | End: 2022-03-22

## 2021-12-16 NOTE — TELEPHONE ENCOUNTER
Contacted plan for a status check on the Xegeorgejanz appeal. Rep, Imelda, stated that the appeal was denied. Confirmed that she is resending the decision letter to [phone].

## 2021-12-20 NOTE — TELEPHONE ENCOUNTER
Contacted patient regarding Xeljanz denial. AOR is required; patient asked that the AOR form be emailed to her. Patient understands to sign and date AOR form to [facility] to proceed with external appeal.    AOR form emailed.    Katia Shrestha, PharmD  Clinical Pharmacist  [facility]   [phone]

## 2021-12-21 ENCOUNTER — INFUSION (OUTPATIENT)
Dept: INFECTIOUS DISEASES | Facility: HOSPITAL | Age: 61
End: 2021-12-21
Attending: INTERNAL MEDICINE
Payer: MEDICARE

## 2021-12-21 VITALS
DIASTOLIC BLOOD PRESSURE: 88 MMHG | BODY MASS INDEX: 30.01 KG/M2 | OXYGEN SATURATION: 100 % | SYSTOLIC BLOOD PRESSURE: 161 MMHG | TEMPERATURE: 98 F | HEART RATE: 104 BPM | RESPIRATION RATE: 16 BRPM | WEIGHT: 180.31 LBS

## 2021-12-21 DIAGNOSIS — R13.19 ESOPHAGEAL DYSPHAGIA: ICD-10-CM

## 2021-12-21 DIAGNOSIS — M33.13 DERMATOMYOSITIS: Primary | ICD-10-CM

## 2021-12-21 PROCEDURE — 96366 THER/PROPH/DIAG IV INF ADDON: CPT | Mod: HCNC

## 2021-12-21 PROCEDURE — 96367 TX/PROPH/DG ADDL SEQ IV INF: CPT | Mod: HCNC

## 2021-12-21 PROCEDURE — 96365 THER/PROPH/DIAG IV INF INIT: CPT | Mod: HCNC

## 2021-12-21 PROCEDURE — 63600175 PHARM REV CODE 636 W HCPCS: Mod: HCNC | Performed by: INTERNAL MEDICINE

## 2021-12-21 PROCEDURE — 25000003 PHARM REV CODE 250: Mod: HCNC | Performed by: INTERNAL MEDICINE

## 2021-12-21 PROCEDURE — 96375 TX/PRO/DX INJ NEW DRUG ADDON: CPT | Mod: HCNC

## 2021-12-21 RX ORDER — ACETAMINOPHEN 325 MG/1
650 TABLET ORAL
Status: CANCELLED | OUTPATIENT
Start: 2022-01-11

## 2021-12-21 RX ORDER — SODIUM CHLORIDE 0.9 % (FLUSH) 0.9 %
10 SYRINGE (ML) INJECTION
Status: DISCONTINUED | OUTPATIENT
Start: 2021-12-21 | End: 2021-12-21 | Stop reason: HOSPADM

## 2021-12-21 RX ORDER — FAMOTIDINE 10 MG/ML
20 INJECTION INTRAVENOUS
Status: COMPLETED | OUTPATIENT
Start: 2021-12-21 | End: 2021-12-21

## 2021-12-21 RX ORDER — SODIUM CHLORIDE 0.9 % (FLUSH) 0.9 %
10 SYRINGE (ML) INJECTION
Status: CANCELLED | OUTPATIENT
Start: 2022-01-11

## 2021-12-21 RX ORDER — FAMOTIDINE 10 MG/ML
20 INJECTION INTRAVENOUS
Status: CANCELLED | OUTPATIENT
Start: 2022-01-11

## 2021-12-21 RX ORDER — HEPARIN 100 UNIT/ML
500 SYRINGE INTRAVENOUS
Status: CANCELLED | OUTPATIENT
Start: 2022-01-11

## 2021-12-21 RX ORDER — ACETAMINOPHEN 325 MG/1
650 TABLET ORAL
Status: COMPLETED | OUTPATIENT
Start: 2021-12-21 | End: 2021-12-21

## 2021-12-21 RX ADMIN — DIPHENHYDRAMINE HYDROCHLORIDE 50 MG: 50 INJECTION INTRAMUSCULAR; INTRAVENOUS at 08:12

## 2021-12-21 RX ADMIN — ACETAMINOPHEN 650 MG: 325 TABLET ORAL at 08:12

## 2021-12-21 RX ADMIN — HUMAN IMMUNOGLOBULIN G 80 G: 40 LIQUID INTRAVENOUS at 09:12

## 2021-12-21 RX ADMIN — FAMOTIDINE 20 MG: 10 INJECTION, SOLUTION INTRAVENOUS at 08:12

## 2021-12-21 RX ADMIN — SODIUM CHLORIDE: 0.9 INJECTION, SOLUTION INTRAVENOUS at 08:12

## 2021-12-21 NOTE — TELEPHONE ENCOUNTER
Signed AOR form received from patient. Appeal faxed for IRO of denial to expedited appeals department: [phone].    Katia Shrestha, PharmD  Clinical Pharmacist  [facility]   [phone]

## 2021-12-22 ENCOUNTER — INFUSION (OUTPATIENT)
Dept: INFECTIOUS DISEASES | Facility: HOSPITAL | Age: 61
End: 2021-12-22
Attending: INTERNAL MEDICINE
Payer: MEDICARE

## 2021-12-22 VITALS
SYSTOLIC BLOOD PRESSURE: 166 MMHG | BODY MASS INDEX: 30.12 KG/M2 | OXYGEN SATURATION: 100 % | HEART RATE: 108 BPM | WEIGHT: 181 LBS | DIASTOLIC BLOOD PRESSURE: 86 MMHG | TEMPERATURE: 98 F | RESPIRATION RATE: 16 BRPM

## 2021-12-22 DIAGNOSIS — R13.19 ESOPHAGEAL DYSPHAGIA: ICD-10-CM

## 2021-12-22 DIAGNOSIS — M33.13 DERMATOMYOSITIS: Primary | ICD-10-CM

## 2021-12-22 PROCEDURE — 96375 TX/PRO/DX INJ NEW DRUG ADDON: CPT | Mod: HCNC

## 2021-12-22 PROCEDURE — 96365 THER/PROPH/DIAG IV INF INIT: CPT | Mod: HCNC

## 2021-12-22 PROCEDURE — 96367 TX/PROPH/DG ADDL SEQ IV INF: CPT | Mod: HCNC

## 2021-12-22 PROCEDURE — 63600175 PHARM REV CODE 636 W HCPCS: Mod: JG,HCNC | Performed by: INTERNAL MEDICINE

## 2021-12-22 PROCEDURE — 96366 THER/PROPH/DIAG IV INF ADDON: CPT | Mod: HCNC

## 2021-12-22 PROCEDURE — 25000003 PHARM REV CODE 250: Mod: HCNC | Performed by: INTERNAL MEDICINE

## 2021-12-22 RX ORDER — SODIUM CHLORIDE 0.9 % (FLUSH) 0.9 %
10 SYRINGE (ML) INJECTION
Status: DISCONTINUED | OUTPATIENT
Start: 2021-12-22 | End: 2021-12-22 | Stop reason: HOSPADM

## 2021-12-22 RX ORDER — FAMOTIDINE 10 MG/ML
20 INJECTION INTRAVENOUS
Status: COMPLETED | OUTPATIENT
Start: 2021-12-22 | End: 2021-12-22

## 2021-12-22 RX ORDER — ACETAMINOPHEN 325 MG/1
650 TABLET ORAL
Status: COMPLETED | OUTPATIENT
Start: 2021-12-22 | End: 2021-12-22

## 2021-12-22 RX ORDER — SODIUM CHLORIDE 0.9 % (FLUSH) 0.9 %
10 SYRINGE (ML) INJECTION
Status: CANCELLED | OUTPATIENT
Start: 2022-01-11

## 2021-12-22 RX ORDER — ACETAMINOPHEN 325 MG/1
650 TABLET ORAL
Status: CANCELLED | OUTPATIENT
Start: 2022-01-11

## 2021-12-22 RX ORDER — HEPARIN 100 UNIT/ML
500 SYRINGE INTRAVENOUS
Status: CANCELLED | OUTPATIENT
Start: 2022-01-11

## 2021-12-22 RX ORDER — FAMOTIDINE 10 MG/ML
20 INJECTION INTRAVENOUS
Status: CANCELLED | OUTPATIENT
Start: 2022-01-11

## 2021-12-22 RX ADMIN — FAMOTIDINE 20 MG: 10 INJECTION, SOLUTION INTRAVENOUS at 08:12

## 2021-12-22 RX ADMIN — DIPHENHYDRAMINE HYDROCHLORIDE 50 MG: 50 INJECTION INTRAMUSCULAR; INTRAVENOUS at 08:12

## 2021-12-22 RX ADMIN — SODIUM CHLORIDE: 0.9 INJECTION, SOLUTION INTRAVENOUS at 08:12

## 2021-12-22 RX ADMIN — HUMAN IMMUNOGLOBULIN G 80 G: 40 LIQUID INTRAVENOUS at 09:12

## 2021-12-22 RX ADMIN — ACETAMINOPHEN 650 MG: 325 TABLET ORAL at 08:12

## 2021-12-29 NOTE — TELEPHONE ENCOUNTER
Xeljanz Second Level/ IRO Appeal DENIED due to off- label diagnosis. No further appeals available.    Informed patient of Xeljanz denial and appeal options exhausted on current plan. Patient will have a new Rx insurance plan as of Jan 1 2022. Plan information added to [facility]. Will process claim once coverage is active, and proceed with PA on new insurance. [facility] will follow up with patient in 2022. Patient voiced understanding.    Katia Shrestha, PharmD  Clinical Pharmacist  [facility]   [phone]

## 2022-01-07 ENCOUNTER — PATIENT MESSAGE (OUTPATIENT)
Dept: INTERNAL MEDICINE | Facility: CLINIC | Age: 62
End: 2022-01-07
Payer: MEDICARE

## 2022-01-07 ENCOUNTER — LAB VISIT (OUTPATIENT)
Dept: LAB | Facility: HOSPITAL | Age: 62
End: 2022-01-07
Attending: INTERNAL MEDICINE
Payer: COMMERCIAL

## 2022-01-07 DIAGNOSIS — M33.13 DERMATOMYOSITIS: ICD-10-CM

## 2022-01-07 LAB
ALBUMIN SERPL BCP-MCNC: 3.6 G/DL (ref 3.5–5.2)
ALP SERPL-CCNC: 124 U/L (ref 55–135)
ALT SERPL W/O P-5'-P-CCNC: 16 U/L (ref 10–44)
ANION GAP SERPL CALC-SCNC: 8 MMOL/L (ref 8–16)
AST SERPL-CCNC: 31 U/L (ref 10–40)
BASOPHILS # BLD AUTO: 0.04 K/UL (ref 0–0.2)
BASOPHILS NFR BLD: 0.9 % (ref 0–1.9)
BILIRUB SERPL-MCNC: 0.2 MG/DL (ref 0.1–1)
BUN SERPL-MCNC: 10 MG/DL (ref 8–23)
CALCIUM SERPL-MCNC: 9.8 MG/DL (ref 8.7–10.5)
CHLORIDE SERPL-SCNC: 104 MMOL/L (ref 95–110)
CK SERPL-CCNC: 200 U/L (ref 20–180)
CO2 SERPL-SCNC: 25 MMOL/L (ref 23–29)
CREAT SERPL-MCNC: 0.8 MG/DL (ref 0.5–1.4)
CRP SERPL-MCNC: 8 MG/L (ref 0–8.2)
DIFFERENTIAL METHOD: ABNORMAL
EOSINOPHIL # BLD AUTO: 0.1 K/UL (ref 0–0.5)
EOSINOPHIL NFR BLD: 1.7 % (ref 0–8)
ERYTHROCYTE [DISTWIDTH] IN BLOOD BY AUTOMATED COUNT: 15.3 % (ref 11.5–14.5)
ERYTHROCYTE [SEDIMENTATION RATE] IN BLOOD BY WESTERGREN METHOD: 67 MM/HR (ref 0–36)
EST. GFR  (AFRICAN AMERICAN): >60 ML/MIN/1.73 M^2
EST. GFR  (NON AFRICAN AMERICAN): >60 ML/MIN/1.73 M^2
GLUCOSE SERPL-MCNC: 87 MG/DL (ref 70–110)
HCT VFR BLD AUTO: 39.1 % (ref 37–48.5)
HGB BLD-MCNC: 12 G/DL (ref 12–16)
IMM GRANULOCYTES # BLD AUTO: 0.01 K/UL (ref 0–0.04)
IMM GRANULOCYTES NFR BLD AUTO: 0.2 % (ref 0–0.5)
LYMPHOCYTES # BLD AUTO: 2.7 K/UL (ref 1–4.8)
LYMPHOCYTES NFR BLD: 58.5 % (ref 18–48)
MCH RBC QN AUTO: 27.3 PG (ref 27–31)
MCHC RBC AUTO-ENTMCNC: 30.7 G/DL (ref 32–36)
MCV RBC AUTO: 89 FL (ref 82–98)
MONOCYTES # BLD AUTO: 0.5 K/UL (ref 0.3–1)
MONOCYTES NFR BLD: 10.1 % (ref 4–15)
NEUTROPHILS # BLD AUTO: 1.3 K/UL (ref 1.8–7.7)
NEUTROPHILS NFR BLD: 28.6 % (ref 38–73)
NRBC BLD-RTO: 0 /100 WBC
PLATELET # BLD AUTO: 251 K/UL (ref 150–450)
PMV BLD AUTO: 9.3 FL (ref 9.2–12.9)
POTASSIUM SERPL-SCNC: 4.1 MMOL/L (ref 3.5–5.1)
PROT SERPL-MCNC: 10.2 G/DL (ref 6–8.4)
RBC # BLD AUTO: 4.39 M/UL (ref 4–5.4)
SODIUM SERPL-SCNC: 137 MMOL/L (ref 136–145)
WBC # BLD AUTO: 4.67 K/UL (ref 3.9–12.7)

## 2022-01-07 PROCEDURE — 85652 RBC SED RATE AUTOMATED: CPT | Performed by: INTERNAL MEDICINE

## 2022-01-07 PROCEDURE — 82085 ASSAY OF ALDOLASE: CPT | Performed by: INTERNAL MEDICINE

## 2022-01-07 PROCEDURE — 86140 C-REACTIVE PROTEIN: CPT | Performed by: INTERNAL MEDICINE

## 2022-01-07 PROCEDURE — 82550 ASSAY OF CK (CPK): CPT | Performed by: INTERNAL MEDICINE

## 2022-01-07 PROCEDURE — 80053 COMPREHEN METABOLIC PANEL: CPT | Performed by: INTERNAL MEDICINE

## 2022-01-07 PROCEDURE — 85025 COMPLETE CBC W/AUTO DIFF WBC: CPT | Performed by: INTERNAL MEDICINE

## 2022-01-07 PROCEDURE — 36415 COLL VENOUS BLD VENIPUNCTURE: CPT | Performed by: INTERNAL MEDICINE

## 2022-01-10 LAB — ALDOLASE SERPL-CCNC: 6.3 U/L (ref 1.2–7.6)

## 2022-01-10 NOTE — TELEPHONE ENCOUNTER
Xeljanz 5mg copay on new insurance is $1244.55 for transitional fill.    Xeljanz 5MG tablets Prior Authorization DENIED due to off-label diagnosis. Case ID: [id number]. First Level Appeal faxed to [phone].    Informed patient of Xeljanz Denial on her new insurance. Explained that [facility] will continue the appeals process. However, even if approved, Xeljanz will still be very expensive and will require enrollment in  PAP Program. Patient elects to get started with the PAP application and requests that it is emailed to her to start. Patient understands to complete application and return with proof of income.     Katia Shrestha, PharmD  Clinical Pharmacist  [facility]   [phone]

## 2022-01-18 ENCOUNTER — PATIENT MESSAGE (OUTPATIENT)
Dept: PRIMARY CARE CLINIC | Facility: CLINIC | Age: 62
End: 2022-01-18
Payer: MEDICARE

## 2022-01-18 ENCOUNTER — INFUSION (OUTPATIENT)
Dept: INFECTIOUS DISEASES | Facility: HOSPITAL | Age: 62
End: 2022-01-18
Attending: INTERNAL MEDICINE
Payer: MEDICARE

## 2022-01-18 VITALS
BODY MASS INDEX: 30.43 KG/M2 | TEMPERATURE: 98 F | SYSTOLIC BLOOD PRESSURE: 155 MMHG | RESPIRATION RATE: 18 BRPM | DIASTOLIC BLOOD PRESSURE: 75 MMHG | HEART RATE: 98 BPM | WEIGHT: 182.88 LBS | OXYGEN SATURATION: 100 %

## 2022-01-18 DIAGNOSIS — R13.19 ESOPHAGEAL DYSPHAGIA: ICD-10-CM

## 2022-01-18 DIAGNOSIS — M33.13 DERMATOMYOSITIS: Primary | ICD-10-CM

## 2022-01-18 PROCEDURE — 96375 TX/PRO/DX INJ NEW DRUG ADDON: CPT

## 2022-01-18 PROCEDURE — 96365 THER/PROPH/DIAG IV INF INIT: CPT

## 2022-01-18 PROCEDURE — 96367 TX/PROPH/DG ADDL SEQ IV INF: CPT

## 2022-01-18 PROCEDURE — 25000003 PHARM REV CODE 250: Performed by: INTERNAL MEDICINE

## 2022-01-18 PROCEDURE — 96366 THER/PROPH/DIAG IV INF ADDON: CPT

## 2022-01-18 PROCEDURE — 63600175 PHARM REV CODE 636 W HCPCS: Mod: JG | Performed by: INTERNAL MEDICINE

## 2022-01-18 RX ORDER — ACETAMINOPHEN 325 MG/1
650 TABLET ORAL
Status: COMPLETED | OUTPATIENT
Start: 2022-01-18 | End: 2022-01-18

## 2022-01-18 RX ORDER — HEPARIN 100 UNIT/ML
500 SYRINGE INTRAVENOUS
Status: DISCONTINUED | OUTPATIENT
Start: 2022-01-18 | End: 2022-01-18 | Stop reason: HOSPADM

## 2022-01-18 RX ORDER — FAMOTIDINE 10 MG/ML
20 INJECTION INTRAVENOUS
Status: COMPLETED | OUTPATIENT
Start: 2022-01-18 | End: 2022-01-18

## 2022-01-18 RX ORDER — SODIUM CHLORIDE 0.9 % (FLUSH) 0.9 %
10 SYRINGE (ML) INJECTION
Status: DISCONTINUED | OUTPATIENT
Start: 2022-01-18 | End: 2022-01-18 | Stop reason: HOSPADM

## 2022-01-18 RX ADMIN — DIPHENHYDRAMINE HYDROCHLORIDE 50 MG: 50 INJECTION INTRAMUSCULAR; INTRAVENOUS at 08:01

## 2022-01-18 RX ADMIN — FAMOTIDINE 20 MG: 10 INJECTION, SOLUTION INTRAVENOUS at 08:01

## 2022-01-18 RX ADMIN — ACETAMINOPHEN 650 MG: 325 TABLET ORAL at 08:01

## 2022-01-18 RX ADMIN — HUMAN IMMUNOGLOBULIN G 85 G: 40 LIQUID INTRAVENOUS at 09:01

## 2022-01-18 RX ADMIN — SODIUM CHLORIDE: 0.9 INJECTION, SOLUTION INTRAVENOUS at 08:01

## 2022-01-18 NOTE — PROGRESS NOTES
Arrived to Infusion suite for 1/2 Privigen infusion. Tylenol 650 mg po, Pepcid 20 mg IVP, and Benadryl 50 mg IVPB administered.  Privigen initiated 30 minutes after pre-meds. IVIG started @ 24ml/hr per MD and pharmacy orders, 59-93-, titrated every 30 mins, NS at 25cc/hr running concurrently with IVIG. Tolerated without difficulty.Left unit in NAD.

## 2022-01-18 NOTE — TELEPHONE ENCOUNTER
Tried to contact pt / Left a message for patient to call the office back. Will send portal message.

## 2022-01-19 ENCOUNTER — INFUSION (OUTPATIENT)
Dept: INFECTIOUS DISEASES | Facility: HOSPITAL | Age: 62
End: 2022-01-19
Attending: INTERNAL MEDICINE
Payer: MEDICARE

## 2022-01-19 VITALS
BODY MASS INDEX: 30.45 KG/M2 | RESPIRATION RATE: 17 BRPM | OXYGEN SATURATION: 99 % | DIASTOLIC BLOOD PRESSURE: 88 MMHG | HEIGHT: 65 IN | HEART RATE: 100 BPM | WEIGHT: 182.75 LBS | TEMPERATURE: 99 F | SYSTOLIC BLOOD PRESSURE: 148 MMHG

## 2022-01-19 DIAGNOSIS — M33.13 DERMATOMYOSITIS: Primary | ICD-10-CM

## 2022-01-19 DIAGNOSIS — R13.19 ESOPHAGEAL DYSPHAGIA: ICD-10-CM

## 2022-01-19 PROCEDURE — 96367 TX/PROPH/DG ADDL SEQ IV INF: CPT

## 2022-01-19 PROCEDURE — 25000003 PHARM REV CODE 250: Performed by: INTERNAL MEDICINE

## 2022-01-19 PROCEDURE — 96366 THER/PROPH/DIAG IV INF ADDON: CPT

## 2022-01-19 PROCEDURE — 63600175 PHARM REV CODE 636 W HCPCS: Performed by: INTERNAL MEDICINE

## 2022-01-19 PROCEDURE — 96365 THER/PROPH/DIAG IV INF INIT: CPT

## 2022-01-19 PROCEDURE — 96375 TX/PRO/DX INJ NEW DRUG ADDON: CPT

## 2022-01-19 RX ORDER — FAMOTIDINE 10 MG/ML
20 INJECTION INTRAVENOUS
Status: COMPLETED | OUTPATIENT
Start: 2022-01-19 | End: 2022-01-19

## 2022-01-19 RX ORDER — SODIUM CHLORIDE 0.9 % (FLUSH) 0.9 %
10 SYRINGE (ML) INJECTION
Status: DISCONTINUED | OUTPATIENT
Start: 2022-01-19 | End: 2022-01-19 | Stop reason: HOSPADM

## 2022-01-19 RX ORDER — ACETAMINOPHEN 325 MG/1
650 TABLET ORAL
Status: COMPLETED | OUTPATIENT
Start: 2022-01-19 | End: 2022-01-19

## 2022-01-19 RX ADMIN — FAMOTIDINE 20 MG: 10 INJECTION, SOLUTION INTRAVENOUS at 08:01

## 2022-01-19 RX ADMIN — SODIUM CHLORIDE: 0.9 INJECTION, SOLUTION INTRAVENOUS at 08:01

## 2022-01-19 RX ADMIN — ACETAMINOPHEN 650 MG: 325 TABLET ORAL at 08:01

## 2022-01-19 RX ADMIN — DIPHENHYDRAMINE HYDROCHLORIDE 50 MG: 50 INJECTION INTRAMUSCULAR; INTRAVENOUS at 08:01

## 2022-01-19 RX ADMIN — HUMAN IMMUNOGLOBULIN G 85 G: 40 LIQUID INTRAVENOUS at 09:01

## 2022-01-19 NOTE — TELEPHONE ENCOUNTER
Incoming call from patient regarding Xeljanz- patient states that she got a call from her insurance on 1/17 that Xeljanz was approved. Claim still stating that it is paying through a transition fill. [facility] can call insurance to see if appeal was approved. Informed patient that copay will still be expensive, and we can still proceed with PAP. Patient agrees computer is broken and patient requested that [facility] mail the application to her. Confirmed address on file. Will forward to FA team to mail application.

## 2022-01-19 NOTE — PROGRESS NOTES
Arrived for Privigen infusion 2/2. Pre-Meds of; Tylenol 650 mg po, Pepcid 20 mg IVP, and Benadryl 50 mg IVPB Privigen initiated 30 minutes after pre-meds. Privigen initiated at the following rates with titration every 30 minutes; 24ml/hr-48ml/hr-96ml/hr-192ml/hr, NS at 25ml/hr concurrently with Privigen. Tolerated without any difficulty.Left unit in NAD.

## 2022-01-24 NOTE — TELEPHONE ENCOUNTER
Xeljanz 5mg First Level Appeal DENIED due to non-FDA approved diagnosis. External, Second Level Appeal available. Faxed appeal to [phone]. External Review has 7 day turn around time.    Katia Shrestha, PharmD  Clinical Pharmacist  [facility]   [phone]

## 2022-01-31 ENCOUNTER — OFFICE VISIT (OUTPATIENT)
Dept: RHEUMATOLOGY | Facility: CLINIC | Age: 62
End: 2022-01-31
Payer: COMMERCIAL

## 2022-01-31 VITALS — WEIGHT: 184.75 LBS | BODY MASS INDEX: 30.74 KG/M2

## 2022-01-31 DIAGNOSIS — M33.13 DERMATOMYOSITIS: Primary | ICD-10-CM

## 2022-01-31 PROCEDURE — 3008F BODY MASS INDEX DOCD: CPT | Mod: CPTII,S$GLB,, | Performed by: INTERNAL MEDICINE

## 2022-01-31 PROCEDURE — 3008F PR BODY MASS INDEX (BMI) DOCUMENTED: ICD-10-PCS | Mod: CPTII,S$GLB,, | Performed by: INTERNAL MEDICINE

## 2022-01-31 PROCEDURE — 1159F MED LIST DOCD IN RCRD: CPT | Mod: CPTII,S$GLB,, | Performed by: INTERNAL MEDICINE

## 2022-01-31 PROCEDURE — 99999 PR PBB SHADOW E&M-EST. PATIENT-LVL III: CPT | Mod: PBBFAC,,, | Performed by: INTERNAL MEDICINE

## 2022-01-31 PROCEDURE — 99214 OFFICE O/P EST MOD 30 MIN: CPT | Mod: S$GLB,,, | Performed by: INTERNAL MEDICINE

## 2022-01-31 PROCEDURE — 99214 PR OFFICE/OUTPT VISIT, EST, LEVL IV, 30-39 MIN: ICD-10-PCS | Mod: S$GLB,,, | Performed by: INTERNAL MEDICINE

## 2022-01-31 PROCEDURE — 99999 PR PBB SHADOW E&M-EST. PATIENT-LVL III: ICD-10-PCS | Mod: PBBFAC,,, | Performed by: INTERNAL MEDICINE

## 2022-01-31 PROCEDURE — 1159F PR MEDICATION LIST DOCUMENTED IN MEDICAL RECORD: ICD-10-PCS | Mod: CPTII,S$GLB,, | Performed by: INTERNAL MEDICINE

## 2022-01-31 RX ORDER — TOFACITINIB 5 MG/1
TABLET, FILM COATED ORAL
Qty: 45 TABLET | Refills: 11 | OUTPATIENT
Start: 2022-01-31 | End: 2022-05-04 | Stop reason: SDUPTHER

## 2022-01-31 NOTE — PROGRESS NOTES
RHEUMATOLOGY CLINIC FOLLOW UP VISIT  Chief complaints:-  To follow up for Myositis follow up     HPI:-  Ermelinda Javed a 61 y.o. pleasant female  with history of L sided infiltrating ductal carcinoma of the L breast (dx on Jan 2017), HTN, depression, gastritis, and recently diagnosed myositis (uncertain if it's autoimmune vs medication induced), present today with concern about myositis flare     Patient was initially send from hem/onc clinic for evaluation of possible inflammatory myositis.  Patient was hospitalized from 1/22/19 till 2/13/19 for myositis.      L breast cancer s/p completion of 4 cycles of demi-adjuvant taxotere and cytoxan (completed on 5/9/17) and mastectomy (6/26/17) and then 4 cycles of adjuvant Adriamycin (completed 9/12/17). In 10/2017 - patient developed L supraclavicular lymphadenopathy which FNA confirmed as reoccurrence of previously treated breast cancer.      Patient started on Atelizumab/Abraxane on 11/7/18. Per chart review, patient noticed rashes on the dorsum of her hands on 11/11/18. Seen in urgent care and given topical steroid cream. Then received two more infusions on Atelizumab/Abraxane on 11/21/18 and 12/5/18. Started to notice puffiness around the eyes on 12/11/18. Received another Abraxane infusion on 12/12/18 but this time with hydrocortisone 50 mg which helped reduce the swelling around the eyes. Developed swelling of the face again on 12/15/18. Next infusion of Abraxane done on 12/19/18 with Solucortef and Atelizumab held. Abraxane given again on 1/3/19 with no IV steroid. Patient developed swelling of the face on 1/4/19 and went to urgent care and given short course of prednisone 20 mg BID. On 1/15/19, patient seen in hem/onc clinic with c/o of pressure and tightness around neck. CT scan of chest and neck did not reveal any vascular compression. Started on prednisone 60 mg with taper. On 1/22/18 patient with c/o  proximal muscle weakness. CPK found to be elevated around 4k. Patient admitted and given solumedrol 80 mg IV on 1/22/18. Last infusion of Atelizumab on 12/19/18. Last infusion of Abraxane on 1/3/19. Given Solumedrol 1g x 1 on 1/23/18. Seen by Dermatology on 1/24/18 and biopsy done of skin rash. Given distribution of rashes, there was concern for dermatomyositis. Patient started on Solumedrol 125 mg IV BID at this time.      During her hospitalization patient was started on high dose steroid Solumedrol 80mg IV x 1, 1g x 1, and then 125mg BID until tapered down to medrol 48mg.  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  Patient was started on IVIG (400mg/kg/daily x 5 day) 1/29/19-2/2.   Patient started on MTX 20mg SQ (Feb 5 2019) with folic acid. MTX SQ was transitioned to PO because concern about rehab administration.  Patient failed swallow eval and PEG tube was placed.        Denies any family history of autoimmune diseases.     No smoking, EtOH, recreational drug usage.     Denies any photosensitivity, joint swelling, unintentional weigh loss, abdominal pain, night sweats, CP, SOB.  +oral thrush.      Completed rehab at [facility].  Completed IVIG (2/28 and 3/1).  Had mild HA after infusion.  Doing well.  A lot stronger - able to do household chores since discharge.  Not back at baseline yet ~80%.  Mild weakness with increased activities.  Able to ambulate without assistance (but is still a fall precaution).  Tolerating diet via PEG tube.  Completed rehab.      MTX discontinued 2/18 with concern of toxicity (decrease blood counts and transaminatis).  Folic acid increased to 4mg daily.  Still on medrol 32mg daily with atorvaquone for PCP prophylaxis.  Bactrim d/c because of interaction with leucovorin.  Leucovorin 5mg daily started - Leucovorin discontinued.  Continues to be on folic acid 4mg daily     Radiation completed (28 days) completed May 8.       EGD/colonoscopy done on July 29 - normal.  PEG  tube removed     Last PET scan (June 20) showed negative for metastasis.  At this time, will monitor per oncology and repeat PET scan in Sept.  No treatment needed at this time.      Rash was biopsied and evaluated by dermatology.   Biopsy report conclusive of dermatomyositis.  Was given topical steroid which provided minimal alleviation of symptoms.  +paco      6/2020    Completed Rituxan on 4/27 (1g x 2).  Had a flare after infusion requiring increase of steroid.  Since then patient had been doing well - improving of myalgia and rash.  Still having mild edema and generalized weakness (especially in the afternoon/evening).       Patient was started on HCQ - compliant on all home medications.  Continues to work out daily.  Finds most difficulty with getting in and out of the car.   Denies any dysphagia, alopecia, arthralgia, abdominal pain, CP, SOB, N/V, chills, or urinary symptoms.      7/2020    Rash all over her body  Red rash on the left leg  Face once again has erythema and heliotrope rash  Prednisone down to 10 mg and looks like she has a flare again  On plaquenil 200 mg bid  Wears sunscreen  Avoids the direct sun    Most recent labs     Ck nml  Aldolase nml  ESR 53  CRP nml  Mild anemia  AST 67      9/2020    She started xeljanz 5 mg bid mid august  She feels well  Swelling is better  Her weakness and fatigue is all gone  Face still looks red   Chest is all better and upper arm is great  Itch and rash seems better   Rash on the legs is gone     CK,aldolase nml  CRP nml   now   AST 61  GFR nml  ALT nml    plts 145 but stable  H/h 11.6/35.3    But the white count has dropped to 2.70    It has been low on and off since 2019 so this is not new     She has a sinus issue going on   She has greenish d/c  She has a tinge of blood       10/12/2020  We cut the xeljanz dose to 5 mg daily  No more swelling  Face looks clear,not much redness  The eyelid is not puffy and not intensely erythematous   Along the  nasal folds there is some redness but again not intense  On the MCPs and Dorsal hands she has some erythema which is more pinkish  Not itchy  Sometimes these pink areas can turn pigmented  Now complaint to sunscreen      White count has improved from 2.72 to 2.80  H/h 11.3/34.7  plts nml  ANC has improved from 0.6 to 1.4  Lymphocyte count 0.9    2/2021    She has fatigue if she has to do a lot of things  Facial rash is stable  On the hands : redness is stable  Sometimes left ankle and left knee is swollen,painful/tight   Not muscular weakness but she feels skinny  BMI is 29.39 though    White count 4.30  H/H 11.6/35.4  plts 223  Lymphocytes nml    AST went upto 52  Rest of LFTs ok  GFR nml  ESR > 120  CRP nml  CK,aldolase nml    2/2021 we made her xeljanz 7.5 mg daily  Gets IVIG     4/2021  Dermatology said    Patient appears to have 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 No need for addition of systemic steroids with controlled myopathy and otherwise absence of systemic disease due to lack of efficacy in controlling cutaneous disease, side effect profile.   Currently with evidence of both nonvasculopathic cutaneous disease (heliotrophe, facial rash, Gottron's papules) as well as a degree vasculopathic disease (nailfold vascular changes, evidence digital pulp ulcers/lesions, associated pain at these locations). Overall patient doing well, states QOL not greatly affected by residual rashes.      Considerations for comorbid rosacea/seb derm at face (micropustules on exam today; some scaling at brows, nasal creases noted).      Recommendations:  - Strict sun protection measures always.   - Optimization of topicals as above: Elidel mixed 50/50 with ketoconazole cream BID PRN rash at face; Plexion wash daily at face; transition to Diprolene cream for rash at hands.   - Offered ILK to rash at hands, patient again declined.   - Consideration for  addition of vasodilatory agent (e.g. nifedipine) for relief in associated pain at fingertip lesions.   - Otherwise c/w rheumatologic management with IVIG, tofacitinib.      Labs 4/2021    H/h 11.3/36.2  nml white count 5.27  plts nml    5/2021    Patient would like to consider steroid/intralesional kenalog injections  She had stopped xeljanz on 4/28 for the covid vaccine   White count was done 2 days after 4/28 which is on 4/30 and the count was 5.27  She got covid vaccine on 3/29   Stopped xeljanz till 4/6  Did the blood work on 4/8 and was on xeljanz at that time  So her white count was 3.80  Prior to that she was 4.30 and 5.19    CMP nml    10/2021    She is doing really well  She has not needed steroid creams or injections  Skin looks really good  She is not puffy,she is not weak    Left foot has a stabbing pain after she walks around for some time  Some aches and pains    8/2021    CRP 15.8  ESR > 130  She had sinus issues at this time  Ck,aldolase nml  AST still 51  ALT nml  GFR nml  H/h 11.6/34.7  nml white count,4.90  nml plts  GGT nml  Vit d nml    1/2022    She is doing really really well  No more red and pink rashes  No more edema  On xeljanz 5 mg daily/was on 7.5 mg daily   Insurance is denying-so appeal     Labs     Ck,aldolase nml  CRP nml  ESR 67  CMP nml  CBC nml      Review of Systems   Constitutional: Negative for chills, diaphoresis, fever, malaise/fatigue and weight loss.   HENT: Negative for congestion, ear discharge, ear pain, hearing loss, nosebleeds, sinus pain and tinnitus.    Eyes: Positive for discharge. Negative for photophobia, pain and redness.   Respiratory: Negative for cough, hemoptysis, sputum production, shortness of breath, wheezing and stridor.    Cardiovascular: Negative for chest pain, palpitations, orthopnea, claudication, leg swelling and PND.   Gastrointestinal: Positive for constipation. Negative for abdominal pain, diarrhea, heartburn, nausea and vomiting.   Genitourinary:  Negative for dysuria, frequency, hematuria and urgency.   Musculoskeletal: Positive for myalgias. Negative for back pain, joint pain and neck pain.   Skin: Positive for rash.   Neurological: Negative for dizziness, tingling, tremors, weakness and headaches.   Endo/Heme/Allergies: Does not bruise/bleed easily.   Psychiatric/Behavioral: Negative for depression, hallucinations and suicidal ideas. The patient is not nervous/anxious and does not have insomnia.        Past Medical History:   Diagnosis Date    Anemia 2019    Anxiety 2018    Breast cancer 2017    left    Depression     Dermatomyositis     Diverticulosis     Erosive esophagitis     Gastritis     Hepatic cyst     Hypertension     Immune disorder 2019    Joint pain 2019    Keloid cicatrix 2005    Thyroiditis     Vitamin B12 deficiency 3/8/2018       Past Surgical History:   Procedure Laterality Date    BREAST BIOPSY Left     BREAST RECONSTRUCTION Left 2017     SECTION      COLONOSCOPY  2011    repeat in 10 yrs.    COLONOSCOPY N/A 2019    Procedure: COLONOSCOPY;  Surgeon: Pernell Rosas MD;  Location: [facility] (4TH FLR);  Service: Endoscopy;  Laterality: N/A;  Patient would like to use her PEG tube for bowel prep and then have her PEG tube removed after EGD and colonoscopy procedures while she is still sedated.    D&C      ESOPHAGOGASTRODUODENOSCOPY N/A 2019    Procedure: EGD (ESOPHAGOGASTRODUODENOSCOPY);  Surgeon: Pernell Rosas MD;  Location: [facility] (2ND FLR);  Service: Endoscopy;  Laterality: N/A;    ESOPHAGOGASTRODUODENOSCOPY N/A 2019    Procedure: EGD (ESOPHAGOGASTRODUODENOSCOPY);  Surgeon: Pernell Rosas MD;  Location: [facility] (4TH FLR);  Service: Endoscopy;  Laterality: N/A;  EGD for follow-up of erosive esophagitis per Dr. Rosas    Patient would like to use her PEG tube for bowel prep and then have her PEG tube removed after EGD and colonoscopy procedures while she is still sedated.    INSERTION OF  TUNNELED CENTRAL VENOUS CATHETER (CVC) WITH SUBCUTANEOUS PORT Right 10/31/2018    Procedure: [id number];  Surgeon: Deo Palencia MD;  Location: [facility] OR [address] [place] [address];  Service: General;  Laterality: Right;    MASTECTOMY Left 06/26/2017    MYOMECTOMY      PORTACATH PLACEMENT      SENTINEL LYMPH NODE BIOPSY  02/2017    left    SKIN BIOPSY  2019    TOTAL REDUCTION MAMMOPLASTY Right 2017    UPPER GASTROINTESTINAL ENDOSCOPY          Social History     Tobacco Use    Smoking status: Never Smoker    Smokeless tobacco: Never Used   Substance Use Topics    Alcohol use: Not Currently     Alcohol/week: 1.0 standard drink     Types: 1 Glasses of wine per week     Comment: rare    Drug use: No       Family History   Problem Relation Age of Onset    Heart disease Father     Hypertension Father     Breast cancer Sister 52    Hypertension Mother     No Known Problems Brother     Thyroid disease Daughter         hyperthyroidism s/p thyroidectomy    No Known Problems Son     No Known Problems Brother     No Known Problems Brother     No Known Problems Sister     No Known Problems Daughter     Colon cancer Neg Hx     Ovarian cancer Neg Hx     Celiac disease Neg Hx     Cirrhosis Neg Hx     Colon polyps Neg Hx     Esophageal cancer Neg Hx     Inflammatory bowel disease Neg Hx     Liver cancer Neg Hx     Liver disease Neg Hx     Rectal cancer Neg Hx     Stomach cancer Neg Hx     Ulcerative colitis Neg Hx     Melanoma Neg Hx        Review of patient's allergies indicates:  No Known Allergies    Vitals:    01/31/22 0905   Weight: 83.8 kg (184 lb 11.9 oz)   PainSc: 0-No pain       Physical Exam  HENT:      Head: Normocephalic and atraumatic.   Eyes:      Pupils: Pupils are equal, round, and reactive to light.   Cardiovascular:      Rate and Rhythm: Normal rate and regular rhythm.      Heart sounds: Normal heart sounds.   Pulmonary:      Effort: Pulmonary effort is normal.      Breath sounds:  Normal breath sounds.   Abdominal:      General: Bowel sounds are normal.      Palpations: Abdomen is soft.   Musculoskeletal:         General: Normal range of motion.      Cervical back: Normal range of motion and neck supple.   Skin:     General: Skin is warm and dry.      Findings: No erythema or rash.      Comments: guttron's papules on bilateral hands   heliotropic rash      Neurological:      Mental Status: She is alert and oriented to person, place, and time.      Gait: Gait is intact.   Psychiatric:         Mood and Affect: Mood and affect normal.         Cognition and Memory: Memory normal.         Judgment: Judgment normal.       Digital pitting +    Labs:  Results for ROMEO PEREZ (MRN [id number]) as of 6/17/2020 10:12   Ref. Range 4/22/2020 11:51 4/24/2020 11:43 5/25/2020 09:34 6/10/2020 11:25 6/10/2020 11:26   WBC Latest Ref Range: 3.90 - 12.70 K/uL 4.30  3.60 (L) 4.60    RBC Latest Ref Range: 4.00 - 5.40 M/uL 4.49  4.52 4.47    Hemoglobin Latest Ref Range: 12.0 - 16.0 g/dL 12.1  12.5 12.4    Hematocrit Latest Ref Range: 37.0 - 48.5 % 38.2  38.7 38.4    MCV Latest Ref Range: 82 - 98 fL 85  86 86    MCH Latest Ref Range: 27.0 - 31.0 pg 27.0  27.7 27.8    MCHC Latest Ref Range: 32.0 - 36.0 g/dL 31.8 (L)  32.4 32.4    RDW Latest Ref Range: 11.5 - 14.5 % 16.0 (H)  17.2 (H) 17.3 (H)    Platelets Latest Ref Range: 150 - 350 K/uL 157  206 164    MPV Latest Ref Range: 7.4 - 10.4 fL 7.3 (L)  7.0 (L) 7.2 (L)    Gran% Latest Ref Range: 38.0 - 73.0 % 66.0  62.8 66.2    Gran # (ANC) Latest Ref Range: 1.8 - 7.7 K/uL 2.8  2.2 3.0    Lymph% Latest Ref Range: 18.0 - 48.0 % 15.3 (L)  15.5 (L) 14.6 (L)    Lymph # Latest Ref Range: 1.0 - 4.8 K/uL 0.7 (L)  0.6 (L) 0.7 (L)    Mono% Latest Ref Range: 4.0 - 15.0 % 11.2  17.7 (H) 14.2    Mono # Latest Ref Range: 0.3 - 1.0 K/uL 0.5  0.6 0.6    Eosinophil% Latest Ref Range: 0.0 - 8.0 % 6.5  3.1 3.9    Eos # Latest Ref Range: 0.0 - 0.5 K/uL 0.3  0.1 0.2    Basophil% Latest Ref  Range: 0.0 - 1.9 % 1.0  0.9 1.1    Baso # Latest Ref Range: 0.00 - 0.20 K/uL 0.00  0.00 0.00    nRBC Latest Ref Range: 0 /100 WBC 0  0 0    Differential Method Unknown Automated  Automated Automated    Sed Rate Latest Ref Range: 0 - 30 mm/Hr 56 (H)  91 (H)  73 (H)   Sodium Latest Ref Range: 136 - 145 mmol/L 135 (L)  139 137    Potassium Latest Ref Range: 3.5 - 5.1 mmol/L 3.8  3.8 3.8    Chloride Latest Ref Range: 95 - 110 mmol/L 98  102 102    CO2 Latest Ref Range: 23 - 29 mmol/L 31 (H)  29 29    BUN, Bld Latest Ref Range: 7 - 17 mg/dL 17  12 13    Creatinine Latest Ref Range: 0.70 - 1.20 mg/dL 0.70  0.60 (L) 0.70    eGFR if non African American Latest Ref Range: >60 mL/min/1.73 m^2 >60  >60 >60    eGFR if  Latest Ref Range: >60 mL/min/1.73 m^2 >60  >60 >60    Glucose Latest Ref Range: 74 - 106 mg/dL 114 (H)  89 93    Calcium Latest Ref Range: 8.4 - 10.2 mg/dL 9.8  9.8 9.7    Alkaline Phosphatase Latest Ref Range: 38 - 145 U/L 65  63 58    PROTEIN TOTAL Latest Ref Range: 6.3 - 8.2 g/dL 9.3 (H)  8.9 (H) 8.4 (H)    Albumin Latest Ref Range: 3.5 - 5.2 g/dL 4.1  4.2 4.1    BILIRUBIN TOTAL Latest Ref Range: 0.2 - 1.3 mg/dL 0.4  0.5 0.4    AST Latest Ref Range: 14 - 36 U/L 85 (H)  64 (H) 62 (H)    ALT Latest Ref Range: 10 - 44 U/L 28  26 22    CRP Latest Ref Range: 0.0 - 10.0 mg/L <5.0  <5.0 <5.0    CPK Latest Ref Range: 30 - 135 U/L 115  79 101    Hemoglobin A1C External Latest Ref Range: 4.0 - 6.0 %     6.0   SARS-CoV2 (COVID-19) Qualitative PCR Latest Ref Range: Not Detected   Not Detected      Aldolase Latest Ref Range: < OR = 8.1 U/L 5.9  5.5  4.7     Medication List with Changes/Refills   Current Medications    AZELASTINE (ASTELIN) 137 MCG (0.1 %) NASAL SPRAY    1 spray (137 mcg total) by Nasal route 2 (two) times daily.    CALCIUM CARBONATE (OS-ESTELA) 600 MG CALCIUM (1,500 MG) TAB    Take 600 mg by mouth 2 (two) times daily with meals.    DICLOFENAC SODIUM (VOLTAREN) 1 % GEL    Apply 2 g topically 4  (four) times daily. Apply to areas of left ankle pain    KETOCONAZOLE (NIZORAL) 2 % SHAMPOO    Wash scalp and ears with medicated shampoo at least 2x/week - let sit at least 5 minutes prior to rinsing    LEVOCETIRIZINE (XYZAL) 5 MG TABLET    TAKE 1 TABLET(5 MG) BY MOUTH EVERY EVENING    MAGNESIUM 30 MG TAB    Take by mouth once.    MULTIVITAMIN CAPSULE    Take 1 capsule by mouth once daily.    NIFEDIPINE (PROCARDIA-XL) 30 MG (OSM) 24 HR TABLET    Take 1 tablet (30 mg total) by mouth once daily.    SULFACETAMIDE SODIUM-SULFUR 10-5 % (W/W) CLSR    Wash on face 1-2 times a day    VITAMIN D (VITAMIN D3) 1000 UNITS TAB    Take 1,000 Units by mouth once daily.   Changed and/or Refilled Medications    Modified Medication Previous Medication    TOFACITINIB (XELJANZ) 5 MG TAB tofacitinib (XELJANZ) 5 mg Tab       Alternate taking 1 tablet by mouth every other day with taking 2 tablets by mouth every other day.    Alternate taking 1 tablet by mouth every other day with taking 2 tablets by mouth every other day.       Assessment/Plans:-  59 y.o.F with history of L sided infiltrating ductal carcinoma of the L breast (dx on Jan 2017), HTN, depression, gastritis, and recently diagnosed myositis (uncertain if it's autoimmune vs medication induced), present today for follow up because of concern about myositis flare.     Patient started on Atelizumab/Abraxane on 11/7/18.  Patient developed swelling of the face on 1/4/19 and went to urgent care and given short course of prednisone 20 mg BID. On 1/15/19, patient seen in hem/onc clinic with c/o of pressure and tightness around neck. CT scan of chest and neck did not reveal any vascular compression. Started on prednisone 60 mg with taper. On 1/22/18 patient with c/o proximal muscle weakness. CPK found to be elevated around 4k. Patient admitted and given solumedrol 80 mg IV on 1/22/18. Last infusion of Atelizumab on 12/19/18. Last infusion of Abraxane on 1/3/19. Given Solumedrol 1g x 1  on 1/23/18. Seen by Dermatology on 1/24/18 and biopsy done of skin rash. Given distribution of rashes, there was concern for dermatomyositis. Patient started on Solumedrol 125 mg IV BID at that time.  Skin biopsy resulted consistent with dermatomyositis.     Labs: CPK and Aldolase normalized. +DARCY 1:640 speckled with negative profile.  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 quantTB: indeterminate, T-spot: negative     Spirometry: normal, normal diffusion capacity. FVC: 81%, DLCO: 114%     Patient exhibits signs of dermatomyositis (heliotropic rash and gottron's papules).   Dermatomyositis can be associated with malignancy.  MRI of LUE showed edema of the deltoid and biceps.  Exam with proximal muscle weakness: b/l deltoids 4/5, b/l iliopsoas 4.4/5. 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   Given +TIF1 gamma positivity, most likely dermatomyositis is from underlying malignancy.      Failed 2nd swallow eval on 2/6/19. PEG placed 2/7/2019.     Current medications:  - prednisone 20mg   - IVIG Started Jan 2019 - last dose April 7  - Rituxan 1000mg x 2 (last dose April 27)  - HCQ 200mg BID      Esophageal biopsy - negative for viral infection.  Nonspecific chronic inflammation.     EGD/Colonoscopy (July 2019) - normal. PEG tube removed      Fiboscan done Aug 2019 - showed fatty liver with no scarring/damage.,      Failed Cellcept - worsening leukopenia, elevated LFTs, and other side effects without improvement of symptoms      Recent studies demonstrated increased efficacy in IVIG when spaced out  (Q2w instead of Q4w)     Patient is an intermediate metabolizer based on TPMT.  Cannot start imuran.  Labs still neutropenic and thrombocytopenic at this time - uncertain of the cause (radiation induce BM fibrosis vs medication induce?)      Vaccination completed - Prevnar and Shingles (completed Aug 2019) and flu vaccination for this season (Aug 2019)      Dermatomyositis - currently on Rituxan and Prednisone 20mg daily.  Tolerating well and improvement of symptoms.  Plan is taper steroid and continue Rituxan 1000mg x 2 every 6 months.     It appears that patient continued to fail steroid tapering on multiple occasion.  Patient is uptodate on all CA screening - next PET scan is July 2020.  Other consideration for continued myalgia includes neurological condition such as MG.  Will other MG panel and referral to neurology.     If patient continues to failed steroid tapering - consideration for tacrolimus + IVIG, Xeljanz, plasmapharesis/plasma exchange.       My addendum last time    60 year old female with dermatomyositis s/p PD1 inhibitor used for breast cancer  TIF1 gamma +    Low dose steroids didn't help    Rash+ muscle weakness+dysphagia     IVIG and steroids initial treatment    mtx caused LFT derangement,anemia and leukopenia   She didn't respond to IVIG, initially we gave IVIG 2 g/kg every 4 weeks and then 1 g/kg bw ever 2 weeks : poor response   She was on cellcept 500 mg daily and she had cytopenias   Imuran not an option since she is intermediate metaboliser     She had significant periorbital edema,rash on the neck and thighs    We didn't think she was responding to the treatment     So we gave her rituximab 1000 mg x 2    She did well and then she flared again may 2020    We had to go up on the dose to 30 mg and then tapered it to 20 mg  We started plaquenil    CT chest abdomen and pelvis nml  Colonoscopy nml  PET lymphadenopathy  Echo nml    Myasthenia gravis panel negative     Last time she needed  prednisone 10 mg     She was weak and her skin had flared    Refractory dermatomyositis    We resumed IVIG and xeljanz 5 mg bid     She had done really well        9/2020    She started xeljanz 5 mg bid mid august  She feels well  Swelling is better  Her weakness and fatigue is all gone  Face still looks red   Chest is all better and upper arm is great  Itch and rash seems better   Rash on the legs is gone     CK,aldolase nml  CRP nml   now   AST 61  GFR nml  ALT nml    plts 145 but stable  H/h 11.6/35.3    But the white count has dropped to 2.70    It has been low on and off since 2019 so this is not new     Her lymphocyte count is 900 cells/mm3    Lymphopenia    In the controlled clinical trials, confirmed decreases in absolute lymphocyte counts below 500 cells/mm3 occurred in 0.04% of patients for the 5 mg twice daily and 10 mg twice daily XELJANZ groups combined during the first 3 months of exposure.    Confirmed lymphocyte counts less than 500 cells/mm3 were associated with an increased incidence of treated and serious infections      Her neutrophil count is 1300 cells/mm3    Neutropenia    In the controlled clinical trials, confirmed decreases in ANC below 1000 cells/mm3 occurred in 0.07% of patients for the 5 mg twice daily and 10 mg twice daily XELJANZ groups combined during the first 3 months of exposure.    There were no confirmed decreases in ANC below 500 cells/mm3 observed in any treatment group.    There was no clear relationship between neutropenia and the occurrence of serious infections.    In the long-term safety population, the pattern and incidence of confirmed decreases in ANC remained consistent with what was seen in the controlled clinical trials        Lymphocyte Abnormalities    Treatment with XELJANZ was associated with initial lymphocytosis at one month of exposure followed by a gradual decrease in mean absolute lymphocyte counts below the baseline of approximately 10% during 12  months of therapy. Lymphocyte counts less than 500 cells/mm3 were associated with an increased incidence of treated and serious infections.    Avoid initiation of XELJANZ/XELJANZ XR treatment in patients with a low lymphocyte count (i.e., less than 500 cells/mm3). In patients who develop a confirmed absolute lymphocyte count less than 500 cells/mm3, treatment with XELJANZ/XELJANZ XR is not recommended.    Monitor lymphocyte counts at baseline and every 3 months thereafter.       Neutropenia    Treatment with XELJANZ was associated with an increased incidence of neutropenia (less than 2000 cells/mm3) compared to placebo.    Avoid initiation of XELJANZ/XELJANZ XR treatment in patients with a low neutrophil count (i.e., ANC less than 1000 cells/mm3). For patients who develop a persistent ANC of 500 to 1000 cells/mm3, interrupt XELJANZ/XELJANZ XR dosing until ANC is greater than or equal to 1000 cells/mm3. In patients who develop an ANC less than 500 cells/mm3, treatment with XELJANZ/XELJANZ XR is not recommended.    Monitor neutrophil counts at baseline and after 4-8 weeks of treatment and every 3 months thereafter.     Anemia    Avoid initiation of XELJANZ/XELJANZ XR treatment in patients with a low hemoglobin level (i.e., less than 9 g/dL). Treatment with XELJANZ/XELJANZ XR should be interrupted in patients who develop hemoglobin levels less than 8 g/dL or whose hemoglobin level drops greater than 2 g/dL on treatment.    So at this point we decided to continue the xeljanz 5 mg bid and IVIG     But we sent her to hematology and the counts further dropped     10/12/2020  We cut the xeljanz dose to 5 mg daily  No more swelling  Face looks clear,not much redness  The eyelid is not puffy and not intensely erythematous   Along the nasal folds there is some redness but again not intense  On the MCPs and Dorsal hands she has some erythema which is more pinkish  Not itchy  Sometimes these pink areas can turn pigmented  Now  complaint to sunscreen      White count has improved from 2.72 to 2.80  H/h 11.3/34.7  plts nml  ANC has improved from 0.6 to 1.4  Lymphocyte count 0.9    Ck,aldolase nml  CRP nml  ESR 65      2/2021  Now completely off prednisone    Some hot flashes at nights  Not documented temperatures/some night sweats  Recent cancer screening negative  Ct chest,abdomen and pelvis and chest    She has fatigue if she has to do a lot of things  Facial rash is stable  On the hands : redness is stable  Sometimes left ankle and left knee is swollen,painful/tight   Not muscular weakness but she feels skinny  BMI is 29.39 though    White count 4.30  H/H 11.6/35.4  plts 223  Lymphocytes nml    AST went upto 52  Rest of LFTs ok  GFR nml  ESR > 120  CRP nml  CK,aldolase nml        2/2021 we made her xeljanz 7.5 mg daily  Gets IVIG     4/2021  Dermatology said    Patient appears to have 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 No need for addition of systemic steroids with controlled myopathy and otherwise absence of systemic disease due to lack of efficacy in controlling cutaneous disease, side effect profile.   Currently with evidence of both nonvasculopathic cutaneous disease (heliotrophe, facial rash, Gottron's papules) as well as a degree vasculopathic disease (nailfold vascular changes, evidence digital pulp ulcers/lesions, associated pain at these locations). Overall patient doing well, states QOL not greatly affected by residual rashes.      Considerations for comorbid rosacea/seb derm at face (micropustules on exam today; some scaling at brows, nasal creases noted).      Recommendations:  - Strict sun protection measures always.   - Optimization of topicals as above: Elidel mixed 50/50 with ketoconazole cream BID PRN rash at face; Plexion wash daily at face; transition to Diprolene cream for rash at hands.   - Offered ILK to rash at hands, patient again  declined.   - Consideration for addition of vasodilatory agent (e.g. nifedipine) for relief in associated pain at fingertip lesions.   - Otherwise c/w rheumatologic management with IVIG, tofacitinib.      Labs 4/2021    H/h 11.3/36.2  nml white count 5.27  plts nml    5/2021    Patient would like to consider steroid/intralesional kenalog injections  She had stopped xeljanz on 4/28 for the covid vaccine   White count was done 2 days after 4/28 which is on 4/30 and the count was 5.27  She got covid vaccine on 3/29   Stopped xeljanz till 4/6  Did the blood work on 4/8 and was on xeljanz at that time  So her white count was 3.80  Prior to that she was 4.30 and 5.19    CMP nml      5/2021  She has shown remarkable improvement   She used to be very erythematous on the face and the hands,but that has significantly resolved  She had rashes on the chest,upper back and legs and that has resolved  She had calcinosis and that has resolved  She has no muscle weakness  All she has is mild erythema on the forehead,nose,around the nasolabial fold,heliotrope rash on the eyelids,gottrons papules on the MCPs and PIPs   She has digital pitting with no active ulcers  She has nail fold capillary changes     10/2021    She is doing really well  She has not needed steroid creams or injections  Skin looks really good  She is not puffy,she is not weak    Left foot has a stabbing pain after she walks around for some time  Some aches and pains-exam nml  May be neuropathy    8/2021    CRP 15.8  ESR > 130  She had sinus issues at this time  Ck,aldolase nml  AST still 51  ALT nml  GFR nml  H/h 11.6/34.7  nml white count,4.90  nml plts  GGT nml  Vit d nml    1/2022    She is doing really really well  No more red and pink rashes  No more edema  On xeljanz 5 mg daily/was on 7.5 mg daily   Insurance is denying-so appeal   Patient really needs to continue the current regimen since she has done really well,she has tried and failed many other drugs and  stopping xeljanz will only cause flare up of the disease    Labs     Ck,aldolase nml  CRP nml  ESR 67  CMP nml  CBC nml    Plan     -never had blood clot  -never had diverticulitis    Left foot xray/podiatry    CONTINUE  IVIG  Continue   tofacitinib 7.5 mg daily  One tab and 2 tabs alternate days   Appeal happening     Off steroids    Will wait for 3 months-make sure she has stable disease  Only after I make sure nothing is flaring again-for 6 months I feel confident to slowly taper IVIG,Will make it every 6 weeks and continue xeljanz   No changes will be made now and next visit     Dermatology -f/u    Sun screen overtly emphasised    - continue muscle strengthening exercise daily      CBC,CMP,ESR,CRP,ck,aldolase in 3 months    Follow up in 3 months     I had referred her to rheum-derm clinic,but looks like she is stable enough to be seen by me and derm separately    Vaccines  Covid x 2  Booster utd  4th one -5 months after the last dose  Flu utd  Pneumonia utd  Shingles     DXA-osteopenia     - 1200 mg dietary calcium and Vitamin D3 1000 mg daily      Answers for HPI/ROS submitted by the patient on 10/7/2021  mouth sores: No  trouble swallowing: No  unexpected weight change: No  genital sore: No        Answers for HPI/ROS submitted by the patient on 1/26/2022  mouth sores: No  trouble swallowing: No  unexpected weight change: No  genital sore: No

## 2022-02-15 ENCOUNTER — INFUSION (OUTPATIENT)
Dept: INFECTIOUS DISEASES | Facility: HOSPITAL | Age: 62
End: 2022-02-15
Attending: INTERNAL MEDICINE
Payer: MEDICARE

## 2022-02-15 VITALS
SYSTOLIC BLOOD PRESSURE: 156 MMHG | TEMPERATURE: 99 F | RESPIRATION RATE: 18 BRPM | WEIGHT: 178.44 LBS | OXYGEN SATURATION: 98 % | HEIGHT: 65 IN | BODY MASS INDEX: 29.73 KG/M2 | HEART RATE: 95 BPM | DIASTOLIC BLOOD PRESSURE: 74 MMHG

## 2022-02-15 DIAGNOSIS — R13.19 ESOPHAGEAL DYSPHAGIA: ICD-10-CM

## 2022-02-15 DIAGNOSIS — M33.13 DERMATOMYOSITIS: Primary | ICD-10-CM

## 2022-02-15 PROCEDURE — 96367 TX/PROPH/DG ADDL SEQ IV INF: CPT

## 2022-02-15 PROCEDURE — 96365 THER/PROPH/DIAG IV INF INIT: CPT

## 2022-02-15 PROCEDURE — 96366 THER/PROPH/DIAG IV INF ADDON: CPT

## 2022-02-15 PROCEDURE — 96375 TX/PRO/DX INJ NEW DRUG ADDON: CPT

## 2022-02-15 PROCEDURE — 25000003 PHARM REV CODE 250: Performed by: INTERNAL MEDICINE

## 2022-02-15 PROCEDURE — 63600175 PHARM REV CODE 636 W HCPCS: Mod: JG | Performed by: INTERNAL MEDICINE

## 2022-02-15 RX ORDER — FAMOTIDINE 10 MG/ML
20 INJECTION INTRAVENOUS
Status: COMPLETED | OUTPATIENT
Start: 2022-02-15 | End: 2022-02-15

## 2022-02-15 RX ORDER — ACETAMINOPHEN 325 MG/1
650 TABLET ORAL
Status: COMPLETED | OUTPATIENT
Start: 2022-02-15 | End: 2022-02-15

## 2022-02-15 RX ORDER — SODIUM CHLORIDE 0.9 % (FLUSH) 0.9 %
10 SYRINGE (ML) INJECTION
Status: DISCONTINUED | OUTPATIENT
Start: 2022-02-15 | End: 2022-02-15 | Stop reason: HOSPADM

## 2022-02-15 RX ADMIN — DIPHENHYDRAMINE HYDROCHLORIDE 50 MG: 50 INJECTION INTRAMUSCULAR; INTRAVENOUS at 08:02

## 2022-02-15 RX ADMIN — HUMAN IMMUNOGLOBULIN G 80 G: 40 LIQUID INTRAVENOUS at 09:02

## 2022-02-15 RX ADMIN — SODIUM CHLORIDE: 0.9 INJECTION, SOLUTION INTRAVENOUS at 08:02

## 2022-02-15 RX ADMIN — FAMOTIDINE 20 MG: 10 INJECTION INTRAVENOUS at 08:02

## 2022-02-15 RX ADMIN — ACETAMINOPHEN 650 MG: 325 TABLET ORAL at 08:02

## 2022-02-15 NOTE — PROGRESS NOTES
Arrived for Privigen infusion 1/2. Pre-Meds of; Tylenol 650 mg po, Pepcid 20 mg IVP, and Benadryl 50 mg IVPB Privigen initiated 30 minutes after pre-meds. Privigen initiated at the following rates with titration every 30 minutes; 24ml/hr-48ml/hr-97ml/hr-194ml/hr, NS at 25ml/hr concurrently with Privigen. Tolerated without any difficulty.Left unit in NAD.

## 2022-02-16 ENCOUNTER — PATIENT MESSAGE (OUTPATIENT)
Dept: RHEUMATOLOGY | Facility: CLINIC | Age: 62
End: 2022-02-16
Payer: MEDICARE

## 2022-02-16 ENCOUNTER — INFUSION (OUTPATIENT)
Dept: INFECTIOUS DISEASES | Facility: HOSPITAL | Age: 62
End: 2022-02-16
Attending: INTERNAL MEDICINE
Payer: MEDICARE

## 2022-02-16 VITALS
SYSTOLIC BLOOD PRESSURE: 150 MMHG | DIASTOLIC BLOOD PRESSURE: 78 MMHG | RESPIRATION RATE: 18 BRPM | BODY MASS INDEX: 29.66 KG/M2 | WEIGHT: 178.25 LBS | TEMPERATURE: 98 F | OXYGEN SATURATION: 100 % | HEART RATE: 104 BPM

## 2022-02-16 DIAGNOSIS — M33.13 DERMATOMYOSITIS: Primary | ICD-10-CM

## 2022-02-16 DIAGNOSIS — R13.19 ESOPHAGEAL DYSPHAGIA: ICD-10-CM

## 2022-02-16 PROCEDURE — 96375 TX/PRO/DX INJ NEW DRUG ADDON: CPT

## 2022-02-16 PROCEDURE — 96367 TX/PROPH/DG ADDL SEQ IV INF: CPT

## 2022-02-16 PROCEDURE — 63600175 PHARM REV CODE 636 W HCPCS: Performed by: INTERNAL MEDICINE

## 2022-02-16 PROCEDURE — 96365 THER/PROPH/DIAG IV INF INIT: CPT

## 2022-02-16 PROCEDURE — 25000003 PHARM REV CODE 250: Performed by: INTERNAL MEDICINE

## 2022-02-16 PROCEDURE — 96366 THER/PROPH/DIAG IV INF ADDON: CPT

## 2022-02-16 RX ORDER — SODIUM CHLORIDE 0.9 % (FLUSH) 0.9 %
10 SYRINGE (ML) INJECTION
Status: DISCONTINUED | OUTPATIENT
Start: 2022-02-16 | End: 2022-02-16 | Stop reason: HOSPADM

## 2022-02-16 RX ORDER — ACETAMINOPHEN 325 MG/1
650 TABLET ORAL
Status: COMPLETED | OUTPATIENT
Start: 2022-02-16 | End: 2022-02-16

## 2022-02-16 RX ORDER — FAMOTIDINE 10 MG/ML
20 INJECTION INTRAVENOUS
Status: COMPLETED | OUTPATIENT
Start: 2022-02-16 | End: 2022-02-16

## 2022-02-16 RX ORDER — HEPARIN 100 UNIT/ML
500 SYRINGE INTRAVENOUS
Status: DISCONTINUED | OUTPATIENT
Start: 2022-02-16 | End: 2022-02-16 | Stop reason: HOSPADM

## 2022-02-16 RX ADMIN — FAMOTIDINE 20 MG: 10 INJECTION INTRAVENOUS at 08:02

## 2022-02-16 RX ADMIN — SODIUM CHLORIDE: 0.9 INJECTION, SOLUTION INTRAVENOUS at 08:02

## 2022-02-16 RX ADMIN — DIPHENHYDRAMINE HYDROCHLORIDE 50 MG: 50 INJECTION INTRAMUSCULAR; INTRAVENOUS at 08:02

## 2022-02-16 RX ADMIN — HUMAN IMMUNOGLOBULIN G 80 G: 20 LIQUID INTRAVENOUS at 09:02

## 2022-02-16 RX ADMIN — ACETAMINOPHEN 650 MG: 325 TABLET ORAL at 08:02

## 2022-02-16 NOTE — PROGRESS NOTES
Arrived for Privigen infusion 2/2. Tylenol 650 mg po, Pepcid 20 mg IVP, and Benadryl 50 mg IVPB administered 30 minutes after pre-medications. Privigen initiated at the following rates with titration every 30 minutes; 24ml/hr-48ml/hr-96ml/hr-193ml/hr, NS at 25ml/hr concurrently with Privigen. Tolerated without any difficulty.Left unit in NAD.

## 2022-03-16 ENCOUNTER — OFFICE VISIT (OUTPATIENT)
Dept: PRIMARY CARE CLINIC | Facility: CLINIC | Age: 62
End: 2022-03-16
Payer: MEDICARE

## 2022-03-16 ENCOUNTER — PATIENT MESSAGE (OUTPATIENT)
Dept: PRIMARY CARE CLINIC | Facility: CLINIC | Age: 62
End: 2022-03-16

## 2022-03-16 ENCOUNTER — PATIENT MESSAGE (OUTPATIENT)
Dept: PRIMARY CARE CLINIC | Facility: CLINIC | Age: 62
End: 2022-03-16
Payer: MEDICARE

## 2022-03-16 ENCOUNTER — INFUSION (OUTPATIENT)
Dept: INFECTIOUS DISEASES | Facility: HOSPITAL | Age: 62
End: 2022-03-16
Attending: INTERNAL MEDICINE
Payer: MEDICARE

## 2022-03-16 VITALS
SYSTOLIC BLOOD PRESSURE: 142 MMHG | TEMPERATURE: 98 F | RESPIRATION RATE: 18 BRPM | HEART RATE: 104 BPM | BODY MASS INDEX: 29.8 KG/M2 | DIASTOLIC BLOOD PRESSURE: 72 MMHG | HEIGHT: 65 IN | WEIGHT: 178.88 LBS

## 2022-03-16 DIAGNOSIS — B96.89 BACTERIAL SINUSITIS: Primary | ICD-10-CM

## 2022-03-16 DIAGNOSIS — M33.13 DERMATOMYOSITIS: Primary | ICD-10-CM

## 2022-03-16 DIAGNOSIS — R13.19 ESOPHAGEAL DYSPHAGIA: ICD-10-CM

## 2022-03-16 DIAGNOSIS — D84.821 IMMUNOSUPPRESSION DUE TO DRUG THERAPY: ICD-10-CM

## 2022-03-16 DIAGNOSIS — Z79.899 IMMUNOSUPPRESSION DUE TO DRUG THERAPY: ICD-10-CM

## 2022-03-16 DIAGNOSIS — J32.9 BACTERIAL SINUSITIS: Primary | ICD-10-CM

## 2022-03-16 PROCEDURE — 96375 TX/PRO/DX INJ NEW DRUG ADDON: CPT

## 2022-03-16 PROCEDURE — 99213 PR OFFICE/OUTPT VISIT, EST, LEVL III, 20-29 MIN: ICD-10-PCS | Mod: 95,,, | Performed by: INTERNAL MEDICINE

## 2022-03-16 PROCEDURE — 96365 THER/PROPH/DIAG IV INF INIT: CPT

## 2022-03-16 PROCEDURE — 1160F PR REVIEW ALL MEDS BY PRESCRIBER/CLIN PHARMACIST DOCUMENTED: ICD-10-PCS | Mod: CPTII,95,, | Performed by: INTERNAL MEDICINE

## 2022-03-16 PROCEDURE — 1159F PR MEDICATION LIST DOCUMENTED IN MEDICAL RECORD: ICD-10-PCS | Mod: CPTII,95,, | Performed by: INTERNAL MEDICINE

## 2022-03-16 PROCEDURE — 96366 THER/PROPH/DIAG IV INF ADDON: CPT

## 2022-03-16 PROCEDURE — 1159F MED LIST DOCD IN RCRD: CPT | Mod: CPTII,95,, | Performed by: INTERNAL MEDICINE

## 2022-03-16 PROCEDURE — 1160F RVW MEDS BY RX/DR IN RCRD: CPT | Mod: CPTII,95,, | Performed by: INTERNAL MEDICINE

## 2022-03-16 PROCEDURE — 63600175 PHARM REV CODE 636 W HCPCS: Mod: JG | Performed by: INTERNAL MEDICINE

## 2022-03-16 PROCEDURE — 99213 OFFICE O/P EST LOW 20 MIN: CPT | Mod: 95,,, | Performed by: INTERNAL MEDICINE

## 2022-03-16 PROCEDURE — 96367 TX/PROPH/DG ADDL SEQ IV INF: CPT

## 2022-03-16 PROCEDURE — 25000003 PHARM REV CODE 250: Performed by: INTERNAL MEDICINE

## 2022-03-16 RX ORDER — FAMOTIDINE 10 MG/ML
20 INJECTION INTRAVENOUS
Status: COMPLETED | OUTPATIENT
Start: 2022-03-16 | End: 2022-03-16

## 2022-03-16 RX ORDER — ACETAMINOPHEN 325 MG/1
650 TABLET ORAL
Status: COMPLETED | OUTPATIENT
Start: 2022-03-16 | End: 2022-03-16

## 2022-03-16 RX ORDER — SODIUM CHLORIDE 0.9 % (FLUSH) 0.9 %
10 SYRINGE (ML) INJECTION
Status: DISCONTINUED | OUTPATIENT
Start: 2022-03-16 | End: 2022-03-16 | Stop reason: HOSPADM

## 2022-03-16 RX ORDER — AMOXICILLIN AND CLAVULANATE POTASSIUM 875; 125 MG/1; MG/1
1 TABLET, FILM COATED ORAL EVERY 12 HOURS
Qty: 20 TABLET | Refills: 0 | Status: SHIPPED | OUTPATIENT
Start: 2022-03-16 | End: 2022-03-26

## 2022-03-16 RX ORDER — PROMETHAZINE HYDROCHLORIDE AND CODEINE PHOSPHATE 6.25; 1 MG/5ML; MG/5ML
5 SOLUTION ORAL NIGHTLY PRN
Qty: 180 ML | Refills: 0 | Status: SHIPPED | OUTPATIENT
Start: 2022-03-16 | End: 2022-05-17

## 2022-03-16 RX ORDER — HEPARIN 100 UNIT/ML
500 SYRINGE INTRAVENOUS
Status: DISCONTINUED | OUTPATIENT
Start: 2022-03-16 | End: 2022-03-16 | Stop reason: HOSPADM

## 2022-03-16 RX ADMIN — SODIUM CHLORIDE: 0.9 INJECTION, SOLUTION INTRAVENOUS at 09:03

## 2022-03-16 RX ADMIN — DIPHENHYDRAMINE HYDROCHLORIDE 50 MG: 50 INJECTION, SOLUTION INTRAMUSCULAR; INTRAVENOUS at 09:03

## 2022-03-16 RX ADMIN — ACETAMINOPHEN 650 MG: 325 TABLET ORAL at 09:03

## 2022-03-16 RX ADMIN — FAMOTIDINE 20 MG: 10 INJECTION, SOLUTION INTRAVENOUS at 09:03

## 2022-03-16 RX ADMIN — HUMAN IMMUNOGLOBULIN G 80 G: 20 LIQUID INTRAVENOUS at 10:03

## 2022-03-16 NOTE — PROGRESS NOTES
"The patient location is: [place], [place]  The chief complaint leading to consultation is: sinus congestion    Visit type: audiovisual    Face to Face time with patient: 5 min  10 minutes of total time spent on the encounter, which includes face to face time and non-face to face time preparing to see the patient (eg, review of tests), Obtaining and/or reviewing separately obtained history, Documenting clinical information in the electronic or other health record, Independently interpreting results (not separately reported) and communicating results to the patient/family/caregiver, or Care coordination (not separately reported).     Each patient to whom he or she provides medical services by telemedicine is:  (1) informed of the relationship between the physician and patient and the respective role of any other health care provider with respect to management of the patient; and (2) notified that he or she may decline to receive medical services by telemedicine and may withdraw from such care at any time.    Notes:     Subjective:       Patient ID: Ermelinda Verde is a 61 y.o. female.    HPI  Pt sent the following patient message via portal:  "Can you give me something for my sinus congestion. The mucus is green and the pressure is bad."  3 days ago, started w/ sinus congestion. Worse yesterday. Green sputum. Postnasal drip. Sinuses are painful. Coughing when lying back. No SOB/wheezing. Monday had subjective fever. Took tylenol.   On OTC xyzal and robitussin. Doesn't feel helpful.    Of note, pt ihas dermatomyositis and on tofacitinib and IVIG.    Review of Systems   Constitutional: Negative for activity change and unexpected weight change.   HENT: Positive for rhinorrhea. Negative for hearing loss and trouble swallowing.    Eyes: Negative for discharge and visual disturbance.   Respiratory: Negative for chest tightness and wheezing.    Cardiovascular: Negative for chest pain and palpitations. "   Gastrointestinal: Negative for blood in stool, constipation, diarrhea and vomiting.   Endocrine: Negative for polydipsia and polyuria.   Genitourinary: Negative for difficulty urinating, dysuria, hematuria and menstrual problem.   Musculoskeletal: Positive for arthralgias. Negative for joint swelling and neck pain.   Neurological: Positive for headaches. Negative for weakness.   Psychiatric/Behavioral: Positive for dysphoric mood. Negative for confusion.         Objective:      Physical Exam      Gen - A+OX4, NAD  CHEST - completing full sentences. Normal work of breathing.       Assessment/Plan     Diagnoses and all orders for this visit:    Bacterial sinusitis  -     promethazine-codeine 6.25-10 mg/5 ml (PHENERGAN WITH CODEINE) 6.25-10 mg/5 mL syrup; Take 5 mLs by mouth nightly as needed for Cough.  -     amoxicillin-clavulanate 875-125mg (AUGMENTIN) 875-125 mg per tablet; Take 1 tablet by mouth every 12 (twelve) hours. for 10 days    Immunosuppression due to drug therapy  Low threshold for antibiotics.     Follow up if symptoms worsen or fail to improve.      Reta Weeks MD  [facility] of Internal Medicine [facility]  11:35 AM

## 2022-03-16 NOTE — PROGRESS NOTES
Arrived for Privigen infusion 1/2. Tylenol 650 mg po, Pepcid 20 mg IVP, and Benadryl 50 mg IVPB administered 30 minutes after pre-medications. Privigen initiated at the following rates with titration every 30 minutes; 24ml/hr-48ml/hr-96ml/hr-192ml/hr, NS at 25ml/hr concurrently with Privigen. Tolerated without any difficulty.Left unit in NAD.

## 2022-03-18 ENCOUNTER — INFUSION (OUTPATIENT)
Dept: INFECTIOUS DISEASES | Facility: HOSPITAL | Age: 62
End: 2022-03-18
Attending: INTERNAL MEDICINE
Payer: MEDICARE

## 2022-03-18 VITALS
TEMPERATURE: 99 F | RESPIRATION RATE: 20 BRPM | HEIGHT: 65 IN | OXYGEN SATURATION: 99 % | WEIGHT: 180.31 LBS | BODY MASS INDEX: 30.04 KG/M2 | SYSTOLIC BLOOD PRESSURE: 156 MMHG | DIASTOLIC BLOOD PRESSURE: 76 MMHG | HEART RATE: 98 BPM

## 2022-03-18 DIAGNOSIS — R13.19 ESOPHAGEAL DYSPHAGIA: ICD-10-CM

## 2022-03-18 DIAGNOSIS — M33.13 DERMATOMYOSITIS: Primary | ICD-10-CM

## 2022-03-18 PROCEDURE — 25000003 PHARM REV CODE 250: Performed by: INTERNAL MEDICINE

## 2022-03-18 PROCEDURE — 96365 THER/PROPH/DIAG IV INF INIT: CPT

## 2022-03-18 PROCEDURE — 96367 TX/PROPH/DG ADDL SEQ IV INF: CPT

## 2022-03-18 PROCEDURE — 63600175 PHARM REV CODE 636 W HCPCS: Performed by: INTERNAL MEDICINE

## 2022-03-18 PROCEDURE — 96366 THER/PROPH/DIAG IV INF ADDON: CPT

## 2022-03-18 PROCEDURE — 96375 TX/PRO/DX INJ NEW DRUG ADDON: CPT

## 2022-03-18 RX ORDER — ACETAMINOPHEN 325 MG/1
650 TABLET ORAL
Status: COMPLETED | OUTPATIENT
Start: 2022-03-18 | End: 2022-03-18

## 2022-03-18 RX ORDER — HEPARIN 100 UNIT/ML
500 SYRINGE INTRAVENOUS
Status: DISCONTINUED | OUTPATIENT
Start: 2022-03-18 | End: 2022-03-18 | Stop reason: HOSPADM

## 2022-03-18 RX ORDER — SODIUM CHLORIDE 0.9 % (FLUSH) 0.9 %
10 SYRINGE (ML) INJECTION
Status: DISCONTINUED | OUTPATIENT
Start: 2022-03-18 | End: 2022-03-18 | Stop reason: HOSPADM

## 2022-03-18 RX ORDER — FAMOTIDINE 10 MG/ML
20 INJECTION INTRAVENOUS
Status: COMPLETED | OUTPATIENT
Start: 2022-03-18 | End: 2022-03-18

## 2022-03-18 RX ADMIN — HUMAN IMMUNOGLOBULIN G 80 G: 40 LIQUID INTRAVENOUS at 10:03

## 2022-03-18 RX ADMIN — SODIUM CHLORIDE: 0.9 INJECTION, SOLUTION INTRAVENOUS at 09:03

## 2022-03-18 RX ADMIN — FAMOTIDINE 20 MG: 10 INJECTION, SOLUTION INTRAVENOUS at 09:03

## 2022-03-18 RX ADMIN — ACETAMINOPHEN 650 MG: 325 TABLET ORAL at 09:03

## 2022-03-18 RX ADMIN — DIPHENHYDRAMINE HYDROCHLORIDE 50 MG: 50 INJECTION, SOLUTION INTRAMUSCULAR; INTRAVENOUS at 09:03

## 2022-03-18 NOTE — PROGRESS NOTES
Arrived for Privigen infusion 1/2. Tylenol 650 mg po, Pepcid 20 mg IVP, and Benadryl 50 mg IVPB administered 30 minutes after pre-medications. Privigen initiated at the following rates with titration every 30 minutes; 24ml/hr-49ml/hr-98ml/hr-196ml/hr, NS at 25ml/hr concurrently with Privigen. Tolerated without any difficulty.Left unit in NAD.

## 2022-03-22 ENCOUNTER — OFFICE VISIT (OUTPATIENT)
Dept: PRIMARY CARE CLINIC | Facility: CLINIC | Age: 62
End: 2022-03-22
Payer: MEDICARE

## 2022-03-22 VITALS
SYSTOLIC BLOOD PRESSURE: 135 MMHG | OXYGEN SATURATION: 99 % | WEIGHT: 179.69 LBS | HEART RATE: 95 BPM | HEIGHT: 65 IN | BODY MASS INDEX: 29.94 KG/M2 | DIASTOLIC BLOOD PRESSURE: 88 MMHG

## 2022-03-22 DIAGNOSIS — Z13.220 ENCOUNTER FOR LIPID SCREENING FOR CARDIOVASCULAR DISEASE: ICD-10-CM

## 2022-03-22 DIAGNOSIS — I10 BENIGN ESSENTIAL HYPERTENSION: ICD-10-CM

## 2022-03-22 DIAGNOSIS — G62.0 CHEMOTHERAPY-INDUCED NEUROPATHY: ICD-10-CM

## 2022-03-22 DIAGNOSIS — Z85.3 HISTORY OF BREAST CANCER: ICD-10-CM

## 2022-03-22 DIAGNOSIS — D84.9 IMMUNOSUPPRESSION: ICD-10-CM

## 2022-03-22 DIAGNOSIS — M33.13 DERMATOMYOSITIS: Primary | ICD-10-CM

## 2022-03-22 DIAGNOSIS — Z13.6 ENCOUNTER FOR LIPID SCREENING FOR CARDIOVASCULAR DISEASE: ICD-10-CM

## 2022-03-22 DIAGNOSIS — T45.1X5A CHEMOTHERAPY-INDUCED NEUROPATHY: ICD-10-CM

## 2022-03-22 PROCEDURE — 3008F PR BODY MASS INDEX (BMI) DOCUMENTED: ICD-10-PCS | Mod: CPTII,S$GLB,, | Performed by: INTERNAL MEDICINE

## 2022-03-22 PROCEDURE — 99999 PR PBB SHADOW E&M-EST. PATIENT-LVL V: ICD-10-PCS | Mod: PBBFAC,,, | Performed by: INTERNAL MEDICINE

## 2022-03-22 PROCEDURE — 1159F MED LIST DOCD IN RCRD: CPT | Mod: CPTII,S$GLB,, | Performed by: INTERNAL MEDICINE

## 2022-03-22 PROCEDURE — 99214 OFFICE O/P EST MOD 30 MIN: CPT | Mod: S$GLB,,, | Performed by: INTERNAL MEDICINE

## 2022-03-22 PROCEDURE — 3008F BODY MASS INDEX DOCD: CPT | Mod: CPTII,S$GLB,, | Performed by: INTERNAL MEDICINE

## 2022-03-22 PROCEDURE — 99999 PR PBB SHADOW E&M-EST. PATIENT-LVL V: CPT | Mod: PBBFAC,,, | Performed by: INTERNAL MEDICINE

## 2022-03-22 PROCEDURE — 3075F SYST BP GE 130 - 139MM HG: CPT | Mod: CPTII,S$GLB,, | Performed by: INTERNAL MEDICINE

## 2022-03-22 PROCEDURE — 3075F PR MOST RECENT SYSTOLIC BLOOD PRESS GE 130-139MM HG: ICD-10-PCS | Mod: CPTII,S$GLB,, | Performed by: INTERNAL MEDICINE

## 2022-03-22 PROCEDURE — 99214 PR OFFICE/OUTPT VISIT, EST, LEVL IV, 30-39 MIN: ICD-10-PCS | Mod: S$GLB,,, | Performed by: INTERNAL MEDICINE

## 2022-03-22 PROCEDURE — 1160F PR REVIEW ALL MEDS BY PRESCRIBER/CLIN PHARMACIST DOCUMENTED: ICD-10-PCS | Mod: CPTII,S$GLB,, | Performed by: INTERNAL MEDICINE

## 2022-03-22 PROCEDURE — 1159F PR MEDICATION LIST DOCUMENTED IN MEDICAL RECORD: ICD-10-PCS | Mod: CPTII,S$GLB,, | Performed by: INTERNAL MEDICINE

## 2022-03-22 PROCEDURE — 3079F PR MOST RECENT DIASTOLIC BLOOD PRESSURE 80-89 MM HG: ICD-10-PCS | Mod: CPTII,S$GLB,, | Performed by: INTERNAL MEDICINE

## 2022-03-22 PROCEDURE — 3079F DIAST BP 80-89 MM HG: CPT | Mod: CPTII,S$GLB,, | Performed by: INTERNAL MEDICINE

## 2022-03-22 PROCEDURE — 1160F RVW MEDS BY RX/DR IN RCRD: CPT | Mod: CPTII,S$GLB,, | Performed by: INTERNAL MEDICINE

## 2022-03-22 RX ORDER — NIFEDIPINE 60 MG/1
60 TABLET, EXTENDED RELEASE ORAL DAILY
Qty: 30 TABLET | Refills: 3 | Status: SHIPPED | OUTPATIENT
Start: 2022-03-22 | End: 2022-07-19

## 2022-03-22 NOTE — PROGRESS NOTES
"Subjective:       Patient ID: Ermelinda Verde is a 61 y.o. female.    Chief Complaint: Follow-up    HPI   Finally settling in into temporary apt from the hotel.     Bacterial sinusitis - rx augmentin x 10 days and promethazine-codeine prn cough.   Less pressure in the head. Sinuses are draining now. Cough not as bad.     Dermatomyositis - on xeljanz 5mg daily and IVIG.   Follows w/ Dr. FUNEZ in rheum.    HTN - procardia xl 30mg daily. BP were high prior to IVIG infusions. Does not know where her BP cuff is but will eventually find it.     L infiltrating ductal carcinoma of the L breast 1/2017 s/p L mastectomy.   Had recurrent breast CA s/p chemo XRT.   Chemo induced neuropathy. Numbness and tingling of the legs/feet. Starting to feel achy muscles in the calfs. Hands are ok.   Diagnostic L MMG 10/1/21 - neg.  Saw Dr. Reyna 11/30/21 - f/u in 4 mo w/ labs and CTs. - scheduled.     Review of Systems   Constitutional: Negative for chills and fever.   HENT: Positive for rhinorrhea. Negative for congestion, postnasal drip and sinus pressure.    Respiratory: Positive for cough (just a little). Negative for shortness of breath and wheezing.    Cardiovascular: Negative for chest pain, palpitations and leg swelling.   Gastrointestinal: Negative for abdominal pain, blood in stool, constipation, diarrhea, nausea and vomiting.   Genitourinary: Negative for dysuria and hematuria.   Musculoskeletal: Positive for arthralgias (B legs - tingling and sore to the touch).   Skin: Negative for rash.   Neurological: Positive for numbness. Negative for headaches.   Psychiatric/Behavioral: Negative for dysphoric mood. The patient is nervous/anxious.          Objective:      Physical Exam    /88   Pulse 95   Ht 5' 5" (1.651 m)   Wt 81.5 kg (179 lb 10.8 oz)   LMP  (LMP Unknown)   SpO2 99%   BMI 29.90 kg/m²     GEN - A+OX4, NAD   HEENT - PERRL, EOMI, OP clear. MMM. TM normal.   CV - RRR, no m/r   Chest - CTAB, no wheezing or " rhonchi  Abd - S/NT/ND/+BS.   Ext - 2+BDP and radial pulses. No LE edema.   MSK - no spinal tenderness to palpation. Normal gait.   Skin - No rash. Taut skin.     Previous labs reviewed     Assessment/Plan     Ermelinda was seen today for follow-up.    Diagnoses and all orders for this visit:    Dermatomyositis - consult w/ pharmacy assistance about xeljanz. F/u w/ Dr. RAY   -     Ambulatory referral/consult to Pharmacy Assistance; Future    Immunosuppression - currently getting over sinus infection. Complete course of augmentin.     Benign essential hypertension - go up on procardia from 30 to 60mg daily. BP check in 2 weeks nurse's visit.   -     NIFEdipine (PROCARDIA-XL) 60 MG (OSM) 24 hr tablet; Take 1 tablet (60 mg total) by mouth once daily.    History of breast cancer - has f/u w/ Dr. Reyna upcoming.     Chemotherapy-induced neuropathy - discussed good foot hygiene and checking feet daily.     Encounter for lipid screening for cardiovascular disease  -     Lipid Panel; Future      Follow up in about 4 months (around 7/22/2022).      Reta Weeks MD  Department of Internal Medicine - [facility]  10:21 AM

## 2022-03-24 ENCOUNTER — PATIENT MESSAGE (OUTPATIENT)
Dept: PHARMACY | Facility: CLINIC | Age: 62
End: 2022-03-24
Payer: MEDICARE

## 2022-03-24 NOTE — TELEPHONE ENCOUNTER
BI: Complete Xeljanz    Estimated Copay: $8697.14. Pt is in the initial benefits stage of Medicare Part D. No LIS.  OSP is in network  RX paid under a transition fill. PA was denied, appeal was denied, and waiting on decision of 2nd level of appeal    Will start PAP on the meantime.

## 2022-03-24 NOTE — TELEPHONE ENCOUNTER
Called [employer] to check to see if they approve PAP for off-label uses and how many denials they need from the insurance company. Rep stated that they do approve off-label uses and there has to be 1 original denial and 2 appeals denied (total of 3 denials).     Rep also checked to see if the pt has an account with [employer] and a PAP was submitted today. They received the patient portion and proof of income and the [employer] portion. She asked me to fax the 2 denials that I have and she said they will need the 2nd level of appeal denial when a decision is made.     Faxed PA denial and 1st level appeal denial to [employer] ([phone]). Will send a staff msg to [employer] informing then that we are still waiting on the 2nd appeal decision.

## 2022-03-24 NOTE — PROGRESS NOTES
Mailed patient a co-pay savings card. With this card she is eligible to save maximum benefit $6,000 - $15,000 per calendar year. Card expires 12/31/23

## 2022-03-25 NOTE — TELEPHONE ENCOUNTER
Payton from [employer] (indepdent reviewer) called regarding Xeljanz appeal- she said 2nd level appeal was denied in Dec 2021.  It must have been patient's original plan.  Advised rep that  [employer]P submitted 2nd level appeal in Jan with new plan.  Per rep, cannot re-open request even if its a new plan. Cannot re-consider 2nd level appeal denial within 60 days of decision.   Needs to go to administrative appeal.  Rep was unable to generate updated letter with 2nd level denial with more updated date.  Obtained Payton's call back #.  Will review information with supervisor for next steps.      Payton- [phone]

## 2022-03-30 ENCOUNTER — TELEPHONE (OUTPATIENT)
Dept: RHEUMATOLOGY | Facility: CLINIC | Age: 62
End: 2022-03-30
Payer: MEDICARE

## 2022-03-31 ENCOUNTER — PATIENT MESSAGE (OUTPATIENT)
Dept: HEMATOLOGY/ONCOLOGY | Facility: CLINIC | Age: 62
End: 2022-03-31
Payer: MEDICARE

## 2022-04-03 ENCOUNTER — PATIENT MESSAGE (OUTPATIENT)
Dept: HEMATOLOGY/ONCOLOGY | Facility: CLINIC | Age: 62
End: 2022-04-03
Payer: MEDICARE

## 2022-04-06 ENCOUNTER — CLINICAL SUPPORT (OUTPATIENT)
Dept: PRIMARY CARE CLINIC | Facility: CLINIC | Age: 62
End: 2022-04-06
Payer: MEDICARE

## 2022-04-06 VITALS
RESPIRATION RATE: 18 BRPM | WEIGHT: 180.13 LBS | BODY MASS INDEX: 29.97 KG/M2 | SYSTOLIC BLOOD PRESSURE: 142 MMHG | DIASTOLIC BLOOD PRESSURE: 63 MMHG

## 2022-04-06 PROCEDURE — 99999 PR PBB SHADOW E&M-EST. PATIENT-LVL II: CPT | Mod: PBBFAC,,,

## 2022-04-06 PROCEDURE — 99999 PR PBB SHADOW E&M-EST. PATIENT-LVL II: ICD-10-PCS | Mod: PBBFAC,,,

## 2022-04-06 NOTE — PROGRESS NOTES
Pt. Here for b/p check. ID by name and    Ms. Wadsworth currently lives in [place], driving in this am.  States she was in a rush this am. Had patient use restroom empty bladder.   Said she took her b/p meds this am.  Manual b/p to right arm. 142/63.    Taught patient importance of taking b/p q am after going to bathroom, and finding a quiet spot  To sit and take b/p with feet flat on floor and arm at heart level.   Pt states she will go get a b/p machine as her old one is not working.   Let her know that she can message us with home b/p readings  Patient given b/p log to take home.

## 2022-04-08 NOTE — HOSPITAL COURSE
01/31/2019: Participated with OT.  Sit to stand and transfers SV.  Ambulated 25 ft SV with 1 minor LOB with self-corrected.  Grooming SV.  02/07/2019: Participated with therapy.  Sit to stand (I) and transfers SBA & RW.  Ambulated 28 FT sba & rw.  02/9/2019: Participated with therapy.  Bed mobility SBA.  Sit to stand SV. UBD Sandra and grooming/toileting SBA.  02/11/2019: Participated with therapy.  Bed mobility, sit to stand  (I) and transfers (I)-SBA.  Ambulated 462 SV.     Caller verified medication dosage, frequency and dispense quantity. The medication(s) are set up as pending and waiting for your approval. The preferred pharmacy has been set up and verified.         PER PATIENT HAS ENOUGH UNTIL        Today  .TOLD PATIENT TO GIVE US A 3 DAY NOTICE NEXT TIME BEFORE THEY RUN OUT.

## 2022-04-09 NOTE — TELEPHONE ENCOUNTER
Incoming call from the patient stating she received Xeljanz copay card in the mail. Advised she cannot use copay card as she is medicare, No other questions or concenrs.

## 2022-04-11 NOTE — TELEPHONE ENCOUNTER
Incoming call from patient to inquire if she can use the Xeljanz copay card without her insurance. Discussed the copay card would have to be billed secondary to her insurance and she cannot do that since she has Medicare. Patient verbalized understanding. Routing assigned Rph and supervisor to follow up on the next steps for the PAP process.

## 2022-04-12 ENCOUNTER — INFUSION (OUTPATIENT)
Dept: INFECTIOUS DISEASES | Facility: HOSPITAL | Age: 62
End: 2022-04-12
Attending: INTERNAL MEDICINE
Payer: MEDICARE

## 2022-04-12 VITALS
HEIGHT: 65 IN | WEIGHT: 181.56 LBS | SYSTOLIC BLOOD PRESSURE: 135 MMHG | OXYGEN SATURATION: 100 % | TEMPERATURE: 98 F | RESPIRATION RATE: 17 BRPM | BODY MASS INDEX: 30.25 KG/M2 | HEART RATE: 103 BPM | DIASTOLIC BLOOD PRESSURE: 80 MMHG

## 2022-04-12 DIAGNOSIS — M33.13 DERMATOMYOSITIS: Primary | ICD-10-CM

## 2022-04-12 DIAGNOSIS — R13.19 ESOPHAGEAL DYSPHAGIA: ICD-10-CM

## 2022-04-12 PROCEDURE — 63600175 PHARM REV CODE 636 W HCPCS: Mod: JG | Performed by: INTERNAL MEDICINE

## 2022-04-12 PROCEDURE — 96365 THER/PROPH/DIAG IV INF INIT: CPT

## 2022-04-12 PROCEDURE — 63600175 PHARM REV CODE 636 W HCPCS: Performed by: INTERNAL MEDICINE

## 2022-04-12 PROCEDURE — 96366 THER/PROPH/DIAG IV INF ADDON: CPT

## 2022-04-12 PROCEDURE — 96375 TX/PRO/DX INJ NEW DRUG ADDON: CPT

## 2022-04-12 PROCEDURE — 25000003 PHARM REV CODE 250: Performed by: INTERNAL MEDICINE

## 2022-04-12 RX ORDER — SODIUM CHLORIDE 0.9 % (FLUSH) 0.9 %
10 SYRINGE (ML) INJECTION
Status: CANCELLED | OUTPATIENT
Start: 2022-05-10

## 2022-04-12 RX ORDER — DIPHENHYDRAMINE HYDROCHLORIDE 50 MG/ML
50 INJECTION INTRAMUSCULAR; INTRAVENOUS
Status: CANCELLED
Start: 2022-05-08

## 2022-04-12 RX ORDER — SODIUM CHLORIDE 0.9 % (FLUSH) 0.9 %
10 SYRINGE (ML) INJECTION
Status: DISCONTINUED | OUTPATIENT
Start: 2022-04-12 | End: 2022-04-12 | Stop reason: HOSPADM

## 2022-04-12 RX ORDER — DIPHENHYDRAMINE HYDROCHLORIDE 50 MG/ML
50 INJECTION INTRAMUSCULAR; INTRAVENOUS
Status: COMPLETED | OUTPATIENT
Start: 2022-04-12 | End: 2022-04-12

## 2022-04-12 RX ORDER — HEPARIN 100 UNIT/ML
500 SYRINGE INTRAVENOUS
Status: DISCONTINUED | OUTPATIENT
Start: 2022-04-12 | End: 2022-04-12 | Stop reason: HOSPADM

## 2022-04-12 RX ORDER — FAMOTIDINE 10 MG/ML
20 INJECTION INTRAVENOUS
Status: CANCELLED | OUTPATIENT
Start: 2022-05-10

## 2022-04-12 RX ORDER — FAMOTIDINE 10 MG/ML
20 INJECTION INTRAVENOUS
Status: COMPLETED | OUTPATIENT
Start: 2022-04-12 | End: 2022-04-12

## 2022-04-12 RX ORDER — ACETAMINOPHEN 325 MG/1
650 TABLET ORAL
Status: CANCELLED | OUTPATIENT
Start: 2022-05-10

## 2022-04-12 RX ORDER — ACETAMINOPHEN 325 MG/1
650 TABLET ORAL
Status: COMPLETED | OUTPATIENT
Start: 2022-04-12 | End: 2022-04-12

## 2022-04-12 RX ORDER — DIPHENHYDRAMINE HYDROCHLORIDE 50 MG/ML
50 INJECTION INTRAMUSCULAR; INTRAVENOUS
Status: CANCELLED
Start: 2022-04-12

## 2022-04-12 RX ORDER — FAMOTIDINE 10 MG/ML
20 INJECTION INTRAVENOUS
Status: CANCELLED | OUTPATIENT
Start: 2022-04-12

## 2022-04-12 RX ORDER — HEPARIN 100 UNIT/ML
500 SYRINGE INTRAVENOUS
Status: CANCELLED | OUTPATIENT
Start: 2022-05-10

## 2022-04-12 RX ORDER — SODIUM CHLORIDE 0.9 % (FLUSH) 0.9 %
10 SYRINGE (ML) INJECTION
Status: CANCELLED | OUTPATIENT
Start: 2022-04-12

## 2022-04-12 RX ORDER — ACETAMINOPHEN 325 MG/1
650 TABLET ORAL
Status: CANCELLED | OUTPATIENT
Start: 2022-04-12

## 2022-04-12 RX ORDER — HEPARIN 100 UNIT/ML
500 SYRINGE INTRAVENOUS
Status: CANCELLED | OUTPATIENT
Start: 2022-04-12

## 2022-04-12 RX ADMIN — FAMOTIDINE 20 MG: 10 INJECTION, SOLUTION INTRAVENOUS at 09:04

## 2022-04-12 RX ADMIN — HUMAN IMMUNOGLOBULIN G 80 G: 40 LIQUID INTRAVENOUS at 09:04

## 2022-04-12 RX ADMIN — SODIUM CHLORIDE: 0.9 INJECTION, SOLUTION INTRAVENOUS at 09:04

## 2022-04-12 RX ADMIN — DIPHENHYDRAMINE HYDROCHLORIDE 50 MG: 50 INJECTION INTRAMUSCULAR; INTRAVENOUS at 09:04

## 2022-04-12 RX ADMIN — ACETAMINOPHEN 650 MG: 325 TABLET ORAL at 09:04

## 2022-04-12 NOTE — PROGRESS NOTES
Arrived for Privigen infusion 1/2. Tylenol 650 mg po, Pepcid 20 mg IVP, and Benadryl 50 IVP administered 30 minutes after pre-medications. Privigen initiated at the following rates with titration every 30 minutes; 25ml/hr-49ml/hr-99ml/hr-198ml/hr, NS at 25ml/hr concurrently with Privigen. Tolerated without any difficulty.Left unit in NAD.

## 2022-04-13 NOTE — TELEPHONE ENCOUNTER
Called [employer] to confirm we needed a denial for a 2nd level appeal and the rep confirmed we do need the 2nd level appeal denial.     After discussing with supervisor, she suggested we submit a new PA. Submitted urgent PA, XIMENA Key [id number]. Coverage Determination was sent back as denied.     CaseId:[id number];Status:Denied;Review Type:Prior Auth;Appeal Information: Attention:MEDICARE [employer]INICAL [employer] [facility] [address] [address],[place], [place],[address] Phone:[phone] Fax:[phone]

## 2022-04-14 ENCOUNTER — PATIENT MESSAGE (OUTPATIENT)
Dept: RHEUMATOLOGY | Facility: CLINIC | Age: 62
End: 2022-04-14
Payer: MEDICARE

## 2022-04-18 ENCOUNTER — PATIENT MESSAGE (OUTPATIENT)
Dept: ADMINISTRATIVE | Facility: OTHER | Age: 62
End: 2022-04-18
Payer: MEDICARE

## 2022-04-18 ENCOUNTER — TELEPHONE (OUTPATIENT)
Dept: HEMATOLOGY/ONCOLOGY | Facility: CLINIC | Age: 62
End: 2022-04-18
Payer: MEDICARE

## 2022-04-18 NOTE — TELEPHONE ENCOUNTER
Called [employer] [place] [phone] to check the status of Appeal. Rep stated the appeal is still in review.

## 2022-04-18 NOTE — TELEPHONE ENCOUNTER
During initial consult, pt wanted documents sent with delivery. Patient portion of PAP sent with delivery.

## 2022-04-18 NOTE — TELEPHONE ENCOUNTER
"Returned call for follow up.  Pt required to have CMP drawn before CT scan.  Called pt, scheduled CMP at [facility] lab on Thursday 4/21 at 0900 before CT on Friday 4/22 at 1200.  Pt agrees to date, time and place of both.   Asked to contact office for questions or concerns.             Returned call, dept closed for the day. Will call tomorrow to follow up.                  ----- Message from Kortney Coronado sent at 4/18/2022 12:04 PM CDT -----  Regarding: Orders  Consult/Advisory:           Name Of Caller: Diagnostic Imaging    Contact Preference?: [phone]      What is the nature of the call?: need CMP put in prior to CT on Wednesday           Additional Notes:  "Thank you for all that you do for our patients'"       "

## 2022-04-20 NOTE — TELEPHONE ENCOUNTER
Appeal denied per [facility] CaseId:[id number];Status:Denied;Review Type:Prior Auth;Appeal Information: Attention:[employer] [facility] [employer] [address] [address],[place], [place],[address] Phone:[phone] Fax:[phone];

## 2022-04-21 ENCOUNTER — PATIENT MESSAGE (OUTPATIENT)
Dept: HEMATOLOGY/ONCOLOGY | Facility: CLINIC | Age: 62
End: 2022-04-21
Payer: MEDICARE

## 2022-04-21 ENCOUNTER — TELEPHONE (OUTPATIENT)
Dept: PRIMARY CARE CLINIC | Facility: CLINIC | Age: 62
End: 2022-04-21
Payer: MEDICARE

## 2022-04-22 ENCOUNTER — PATIENT MESSAGE (OUTPATIENT)
Dept: HEMATOLOGY/ONCOLOGY | Facility: CLINIC | Age: 62
End: 2022-04-22
Payer: MEDICARE

## 2022-04-22 NOTE — TELEPHONE ENCOUNTER
Received appeal denial letter.     Submitted external appeal reconsideration to [facility] [employer]. Faxed to [phone].

## 2022-04-29 NOTE — TELEPHONE ENCOUNTER
Outgoing call to [employer] ([phone]) to check the status of Xeljanz external appeal. Had to leave a voicemail. Will f/u 5/2.

## 2022-05-02 ENCOUNTER — PATIENT MESSAGE (OUTPATIENT)
Dept: PHARMACY | Facility: CLINIC | Age: 62
End: 2022-05-02
Payer: MEDICARE

## 2022-05-02 ENCOUNTER — PATIENT MESSAGE (OUTPATIENT)
Dept: PRIMARY CARE CLINIC | Facility: CLINIC | Age: 62
End: 2022-05-02
Payer: MEDICARE

## 2022-05-02 NOTE — TELEPHONE ENCOUNTER
Received external appeal denial from [employer].     Faxed PA denial, 1st level appeal and 2nd level appeal to [employer]. Outgoing call to [employer] to make sure nothing else was needed and the rep stated just the denials were needed.     Informed Dr FUNEZ and pt of status of PAP.

## 2022-05-04 DIAGNOSIS — M33.13 DERMATOMYOSITIS: ICD-10-CM

## 2022-05-04 RX ORDER — TOFACITINIB 5 MG/1
TABLET, FILM COATED ORAL
Qty: 45 TABLET | Refills: 11 | COMMUNITY
Start: 2022-05-04 | End: 2022-09-13

## 2022-05-05 ENCOUNTER — CLINICAL SUPPORT (OUTPATIENT)
Dept: PRIMARY CARE CLINIC | Facility: CLINIC | Age: 62
End: 2022-05-05
Payer: COMMERCIAL

## 2022-05-05 VITALS — SYSTOLIC BLOOD PRESSURE: 142 MMHG | DIASTOLIC BLOOD PRESSURE: 71 MMHG

## 2022-05-05 PROCEDURE — 99999 PR PBB SHADOW E&M-EST. PATIENT-LVL I: CPT | Mod: PBBFAC,,,

## 2022-05-05 PROCEDURE — 99999 PR PBB SHADOW E&M-EST. PATIENT-LVL I: ICD-10-PCS | Mod: PBBFAC,,,

## 2022-05-05 NOTE — TELEPHONE ENCOUNTER
Outgoing call to [employer] to check the status of Xeljanz PAP. Rep stated that they did not receive my fax on 5/2. Re-faxed denials on 5/5 and received confirmation. Will F/u.

## 2022-05-05 NOTE — PROGRESS NOTES
Ms. Wadsworth is here today for her nurse visit to check blood pressure.   Verified by name and .  States she has been checking her b/p at home, but not daily, states her mornings are very rushed and hectic.   Blood pressure manually taken on right arm, 142/71.   She says that her home blood pressures have been close to this when she takes them.( after medication)    She forgot to bring her log, and machine today.  She will send us her numbers on my chart, or can call with them.     Gave her instructions on proper technique to take home blood pressures. ( handout)   MD will be notified of b/p reading, and we will touch base with her next week to see how she is doing.

## 2022-05-06 NOTE — TELEPHONE ENCOUNTER
Status check with [employer], Spoke with rep Alondra. Rep confirmed appeal denials were received and attached to patient's case today. Will take up to 48 hours to review and reach final decision. Rep advised to follow up on 5/11. Will continue to follow up until final determination is made.

## 2022-05-10 ENCOUNTER — INFUSION (OUTPATIENT)
Dept: INFECTIOUS DISEASES | Facility: HOSPITAL | Age: 62
End: 2022-05-10
Attending: INTERNAL MEDICINE
Payer: MEDICARE

## 2022-05-10 VITALS
WEIGHT: 184.75 LBS | SYSTOLIC BLOOD PRESSURE: 146 MMHG | TEMPERATURE: 98 F | HEART RATE: 95 BPM | DIASTOLIC BLOOD PRESSURE: 80 MMHG | BODY MASS INDEX: 30.74 KG/M2 | OXYGEN SATURATION: 99 %

## 2022-05-10 DIAGNOSIS — R13.19 ESOPHAGEAL DYSPHAGIA: ICD-10-CM

## 2022-05-10 DIAGNOSIS — M33.13 DERMATOMYOSITIS: Primary | ICD-10-CM

## 2022-05-10 PROCEDURE — 96365 THER/PROPH/DIAG IV INF INIT: CPT

## 2022-05-10 PROCEDURE — 96366 THER/PROPH/DIAG IV INF ADDON: CPT

## 2022-05-10 PROCEDURE — 25000003 PHARM REV CODE 250: Performed by: INTERNAL MEDICINE

## 2022-05-10 PROCEDURE — 63600175 PHARM REV CODE 636 W HCPCS: Performed by: INTERNAL MEDICINE

## 2022-05-10 PROCEDURE — 96375 TX/PRO/DX INJ NEW DRUG ADDON: CPT

## 2022-05-10 RX ORDER — FAMOTIDINE 10 MG/ML
20 INJECTION INTRAVENOUS
Status: COMPLETED | OUTPATIENT
Start: 2022-05-10 | End: 2022-05-10

## 2022-05-10 RX ORDER — DIPHENHYDRAMINE HYDROCHLORIDE 50 MG/ML
50 INJECTION INTRAMUSCULAR; INTRAVENOUS
Status: CANCELLED
Start: 2022-06-07

## 2022-05-10 RX ORDER — DIPHENHYDRAMINE HYDROCHLORIDE 50 MG/ML
50 INJECTION INTRAMUSCULAR; INTRAVENOUS
Status: COMPLETED | OUTPATIENT
Start: 2022-05-10 | End: 2022-05-10

## 2022-05-10 RX ORDER — HEPARIN 100 UNIT/ML
500 SYRINGE INTRAVENOUS
Status: DISCONTINUED | OUTPATIENT
Start: 2022-05-10 | End: 2022-05-10 | Stop reason: HOSPADM

## 2022-05-10 RX ORDER — SODIUM CHLORIDE 0.9 % (FLUSH) 0.9 %
10 SYRINGE (ML) INJECTION
Status: CANCELLED | OUTPATIENT
Start: 2022-06-07

## 2022-05-10 RX ORDER — ACETAMINOPHEN 325 MG/1
650 TABLET ORAL
Status: CANCELLED | OUTPATIENT
Start: 2022-06-07

## 2022-05-10 RX ORDER — FAMOTIDINE 10 MG/ML
20 INJECTION INTRAVENOUS
Status: CANCELLED | OUTPATIENT
Start: 2022-06-07

## 2022-05-10 RX ORDER — ACETAMINOPHEN 325 MG/1
650 TABLET ORAL
Status: COMPLETED | OUTPATIENT
Start: 2022-05-10 | End: 2022-05-10

## 2022-05-10 RX ORDER — HEPARIN 100 UNIT/ML
500 SYRINGE INTRAVENOUS
Status: CANCELLED | OUTPATIENT
Start: 2022-06-07

## 2022-05-10 RX ORDER — SODIUM CHLORIDE 0.9 % (FLUSH) 0.9 %
10 SYRINGE (ML) INJECTION
Status: DISCONTINUED | OUTPATIENT
Start: 2022-05-10 | End: 2022-05-10 | Stop reason: HOSPADM

## 2022-05-10 RX ADMIN — SODIUM CHLORIDE: 0.9 INJECTION, SOLUTION INTRAVENOUS at 08:05

## 2022-05-10 RX ADMIN — DIPHENHYDRAMINE HYDROCHLORIDE 50 MG: 50 INJECTION INTRAMUSCULAR; INTRAVENOUS at 08:05

## 2022-05-10 RX ADMIN — FAMOTIDINE 20 MG: 10 INJECTION, SOLUTION INTRAVENOUS at 08:05

## 2022-05-10 RX ADMIN — HUMAN IMMUNOGLOBULIN G 85 G: 40 LIQUID INTRAVENOUS at 09:05

## 2022-05-10 RX ADMIN — ACETAMINOPHEN 650 MG: 325 TABLET ORAL at 08:05

## 2022-05-10 NOTE — TELEPHONE ENCOUNTER
Outgoing call to [employer] to check the status of [employer] PAP application. Rep stated the application is still pending review for a final determination.  Rep confirmed receival of PA approval and both appeals.

## 2022-05-10 NOTE — PROGRESS NOTES
Arrived for Privigen infusion 1/2. Tylenol 650 mg po, Pepcid 20 mg IVP, and Benadryl 50 IVP administered 30 minutes after pre-medications. Privigen initiated at the following rates with titration every 30 minutes; 25ml/hr-50ml/hr-101ml/hr-201ml/hr, NS at 25ml/hr concurrently with Privigen. Tolerated without any difficulty.Left unit in NAD.

## 2022-05-11 ENCOUNTER — INFUSION (OUTPATIENT)
Dept: INFECTIOUS DISEASES | Facility: HOSPITAL | Age: 62
End: 2022-05-11
Attending: INTERNAL MEDICINE
Payer: MEDICARE

## 2022-05-11 VITALS
TEMPERATURE: 98 F | BODY MASS INDEX: 30.45 KG/M2 | WEIGHT: 183 LBS | SYSTOLIC BLOOD PRESSURE: 151 MMHG | DIASTOLIC BLOOD PRESSURE: 83 MMHG | OXYGEN SATURATION: 100 % | RESPIRATION RATE: 16 BRPM | HEART RATE: 102 BPM

## 2022-05-11 DIAGNOSIS — R13.19 ESOPHAGEAL DYSPHAGIA: ICD-10-CM

## 2022-05-11 DIAGNOSIS — M33.13 DERMATOMYOSITIS: Primary | ICD-10-CM

## 2022-05-11 PROCEDURE — 96365 THER/PROPH/DIAG IV INF INIT: CPT

## 2022-05-11 PROCEDURE — 25000003 PHARM REV CODE 250: Performed by: INTERNAL MEDICINE

## 2022-05-11 PROCEDURE — 96366 THER/PROPH/DIAG IV INF ADDON: CPT

## 2022-05-11 PROCEDURE — 63600175 PHARM REV CODE 636 W HCPCS: Performed by: INTERNAL MEDICINE

## 2022-05-11 PROCEDURE — 96375 TX/PRO/DX INJ NEW DRUG ADDON: CPT

## 2022-05-11 RX ORDER — DIPHENHYDRAMINE HYDROCHLORIDE 50 MG/ML
50 INJECTION INTRAMUSCULAR; INTRAVENOUS
Status: COMPLETED | OUTPATIENT
Start: 2022-05-11 | End: 2022-05-11

## 2022-05-11 RX ORDER — DIPHENHYDRAMINE HYDROCHLORIDE 50 MG/ML
50 INJECTION INTRAMUSCULAR; INTRAVENOUS
Status: CANCELLED
Start: 2022-06-08

## 2022-05-11 RX ORDER — ACETAMINOPHEN 325 MG/1
650 TABLET ORAL
Status: CANCELLED | OUTPATIENT
Start: 2022-06-08

## 2022-05-11 RX ORDER — HEPARIN 100 UNIT/ML
500 SYRINGE INTRAVENOUS
Status: DISCONTINUED | OUTPATIENT
Start: 2022-05-11 | End: 2022-05-11 | Stop reason: HOSPADM

## 2022-05-11 RX ORDER — FAMOTIDINE 10 MG/ML
20 INJECTION INTRAVENOUS
Status: CANCELLED | OUTPATIENT
Start: 2022-06-08

## 2022-05-11 RX ORDER — HEPARIN 100 UNIT/ML
500 SYRINGE INTRAVENOUS
Status: CANCELLED | OUTPATIENT
Start: 2022-06-08

## 2022-05-11 RX ORDER — SODIUM CHLORIDE 0.9 % (FLUSH) 0.9 %
10 SYRINGE (ML) INJECTION
Status: CANCELLED | OUTPATIENT
Start: 2022-06-08

## 2022-05-11 RX ORDER — SODIUM CHLORIDE 0.9 % (FLUSH) 0.9 %
10 SYRINGE (ML) INJECTION
Status: DISCONTINUED | OUTPATIENT
Start: 2022-05-11 | End: 2022-05-11 | Stop reason: HOSPADM

## 2022-05-11 RX ORDER — FAMOTIDINE 10 MG/ML
20 INJECTION INTRAVENOUS
Status: COMPLETED | OUTPATIENT
Start: 2022-05-11 | End: 2022-05-11

## 2022-05-11 RX ORDER — ACETAMINOPHEN 325 MG/1
650 TABLET ORAL
Status: COMPLETED | OUTPATIENT
Start: 2022-05-11 | End: 2022-05-11

## 2022-05-11 RX ADMIN — SODIUM CHLORIDE: 0.9 INJECTION, SOLUTION INTRAVENOUS at 08:05

## 2022-05-11 RX ADMIN — HUMAN IMMUNOGLOBULIN G 85 G: 5 LIQUID INTRAVENOUS at 09:05

## 2022-05-11 RX ADMIN — FAMOTIDINE 20 MG: 10 INJECTION, SOLUTION INTRAVENOUS at 08:05

## 2022-05-11 RX ADMIN — ACETAMINOPHEN 650 MG: 325 TABLET ORAL at 08:05

## 2022-05-11 RX ADMIN — DIPHENHYDRAMINE HYDROCHLORIDE 50 MG: 50 INJECTION INTRAMUSCULAR; INTRAVENOUS at 08:05

## 2022-05-11 NOTE — PROGRESS NOTES
Arrived for Privigen infusion 2/2. Tylenol 650 mg po, Pepcid 20 mg IVP, and Benadryl 50 IVP administered 30 minutes after pre-medications. Privigen initiated at the following rates with titration every 30 minutes; 24ml/hr-49ml/hr-99ml/hr-199ml/hr, NS at 25ml/hr concurrently with Privigen. Tolerated without any difficulty.Left unit in NAD.

## 2022-05-13 NOTE — TELEPHONE ENCOUNTER
Patient is approved for [employer] Patient Assistance Program as of 05/10/22 through 12/31/22 and will receive Xeljanz  free of charge through the programs dispensing pharmacy.   Rep stated they reached out to the pt to set up delivery and was scheduled for 5/12/22. Made an outgoing call to pt to confirm but no answer, LVM and sent a [facility] message.     Pt responded to [facility] message. She was notified of approval and delivery was scheduled for 5/12.

## 2022-05-16 ENCOUNTER — PATIENT OUTREACH (OUTPATIENT)
Dept: ADMINISTRATIVE | Facility: OTHER | Age: 62
End: 2022-05-16
Payer: MEDICARE

## 2022-05-16 NOTE — PROGRESS NOTES
Health Maintenance Due   Topic Date Due    COVID-19 Vaccine (4 - Booster for Moderna series) 03/14/2022     Updates were requested from care everywhere.  Chart was reviewed for overdue Proactive [facility] Encounters ([facility]) topics (CRS, Breast Cancer Screening, Eye exam)  Health Maintenance has been updated.  LINKS immunization registry triggered.  Immunizations were reconciled.

## 2022-05-17 ENCOUNTER — LAB VISIT (OUTPATIENT)
Dept: LAB | Facility: HOSPITAL | Age: 62
End: 2022-05-17
Attending: INTERNAL MEDICINE
Payer: MEDICARE

## 2022-05-17 ENCOUNTER — OFFICE VISIT (OUTPATIENT)
Dept: RHEUMATOLOGY | Facility: CLINIC | Age: 62
End: 2022-05-17
Payer: MEDICARE

## 2022-05-17 ENCOUNTER — TELEPHONE (OUTPATIENT)
Dept: PRIMARY CARE CLINIC | Facility: CLINIC | Age: 62
End: 2022-05-17
Payer: MEDICARE

## 2022-05-17 ENCOUNTER — PATIENT MESSAGE (OUTPATIENT)
Dept: PRIMARY CARE CLINIC | Facility: CLINIC | Age: 62
End: 2022-05-17
Payer: MEDICARE

## 2022-05-17 VITALS
SYSTOLIC BLOOD PRESSURE: 148 MMHG | HEART RATE: 87 BPM | HEIGHT: 65 IN | DIASTOLIC BLOOD PRESSURE: 91 MMHG | WEIGHT: 186.5 LBS | BODY MASS INDEX: 31.07 KG/M2

## 2022-05-17 DIAGNOSIS — Z17.1 MALIGNANT NEOPLASM OF UPPER-OUTER QUADRANT OF LEFT BREAST IN FEMALE, ESTROGEN RECEPTOR NEGATIVE: ICD-10-CM

## 2022-05-17 DIAGNOSIS — Z13.220 ENCOUNTER FOR LIPID SCREENING FOR CARDIOVASCULAR DISEASE: ICD-10-CM

## 2022-05-17 DIAGNOSIS — Z13.6 ENCOUNTER FOR LIPID SCREENING FOR CARDIOVASCULAR DISEASE: ICD-10-CM

## 2022-05-17 DIAGNOSIS — E78.5 HYPERLIPIDEMIA, UNSPECIFIED HYPERLIPIDEMIA TYPE: Primary | ICD-10-CM

## 2022-05-17 DIAGNOSIS — C50.412 MALIGNANT NEOPLASM OF UPPER-OUTER QUADRANT OF LEFT BREAST IN FEMALE, ESTROGEN RECEPTOR NEGATIVE: ICD-10-CM

## 2022-05-17 DIAGNOSIS — M33.13 DERMATOMYOSITIS: Primary | ICD-10-CM

## 2022-05-17 LAB
ALBUMIN SERPL BCP-MCNC: 3.6 G/DL (ref 3.5–5.2)
ALP SERPL-CCNC: 140 U/L (ref 55–135)
ALT SERPL W/O P-5'-P-CCNC: 14 U/L (ref 10–44)
ANION GAP SERPL CALC-SCNC: 7 MMOL/L (ref 8–16)
AST SERPL-CCNC: 30 U/L (ref 10–40)
BILIRUB SERPL-MCNC: 0.2 MG/DL (ref 0.1–1)
BUN SERPL-MCNC: 9 MG/DL (ref 8–23)
CALCIUM SERPL-MCNC: 9.7 MG/DL (ref 8.7–10.5)
CHLORIDE SERPL-SCNC: 101 MMOL/L (ref 95–110)
CHOLEST SERPL-MCNC: 215 MG/DL (ref 120–199)
CHOLEST/HDLC SERPL: 3.7 {RATIO} (ref 2–5)
CO2 SERPL-SCNC: 27 MMOL/L (ref 23–29)
CREAT SERPL-MCNC: 0.8 MG/DL (ref 0.5–1.4)
ERYTHROCYTE [DISTWIDTH] IN BLOOD BY AUTOMATED COUNT: 15.1 % (ref 11.5–14.5)
EST. GFR  (AFRICAN AMERICAN): >60 ML/MIN/1.73 M^2
EST. GFR  (NON AFRICAN AMERICAN): >60 ML/MIN/1.73 M^2
GLUCOSE SERPL-MCNC: 75 MG/DL (ref 70–110)
HCT VFR BLD AUTO: 40.1 % (ref 37–48.5)
HDLC SERPL-MCNC: 58 MG/DL (ref 40–75)
HDLC SERPL: 27 % (ref 20–50)
HGB BLD-MCNC: 12.4 G/DL (ref 12–16)
IMM GRANULOCYTES # BLD AUTO: 0 K/UL (ref 0–0.04)
LDLC SERPL CALC-MCNC: 130.6 MG/DL (ref 63–159)
MCH RBC QN AUTO: 26.6 PG (ref 27–31)
MCHC RBC AUTO-ENTMCNC: 30.9 G/DL (ref 32–36)
MCV RBC AUTO: 86 FL (ref 82–98)
NEUTROPHILS # BLD AUTO: 1.1 K/UL (ref 1.8–7.7)
NONHDLC SERPL-MCNC: 157 MG/DL
PLATELET # BLD AUTO: 243 K/UL (ref 150–450)
PMV BLD AUTO: 9.1 FL (ref 9.2–12.9)
POTASSIUM SERPL-SCNC: 3.9 MMOL/L (ref 3.5–5.1)
PROT SERPL-MCNC: 11.8 G/DL (ref 6–8.4)
RBC # BLD AUTO: 4.66 M/UL (ref 4–5.4)
SODIUM SERPL-SCNC: 135 MMOL/L (ref 136–145)
TRIGL SERPL-MCNC: 132 MG/DL (ref 30–150)
WBC # BLD AUTO: 5.31 K/UL (ref 3.9–12.7)

## 2022-05-17 PROCEDURE — 1159F MED LIST DOCD IN RCRD: CPT | Mod: CPTII,S$GLB,, | Performed by: INTERNAL MEDICINE

## 2022-05-17 PROCEDURE — 3077F PR MOST RECENT SYSTOLIC BLOOD PRESSURE >= 140 MM HG: ICD-10-PCS | Mod: CPTII,S$GLB,, | Performed by: INTERNAL MEDICINE

## 2022-05-17 PROCEDURE — 1160F PR REVIEW ALL MEDS BY PRESCRIBER/CLIN PHARMACIST DOCUMENTED: ICD-10-PCS | Mod: CPTII,S$GLB,, | Performed by: INTERNAL MEDICINE

## 2022-05-17 PROCEDURE — 80061 LIPID PANEL: CPT | Performed by: INTERNAL MEDICINE

## 2022-05-17 PROCEDURE — 99999 PR PBB SHADOW E&M-EST. PATIENT-LVL III: ICD-10-PCS | Mod: PBBFAC,,, | Performed by: INTERNAL MEDICINE

## 2022-05-17 PROCEDURE — 85027 COMPLETE CBC AUTOMATED: CPT | Performed by: INTERNAL MEDICINE

## 2022-05-17 PROCEDURE — 1159F PR MEDICATION LIST DOCUMENTED IN MEDICAL RECORD: ICD-10-PCS | Mod: CPTII,S$GLB,, | Performed by: INTERNAL MEDICINE

## 2022-05-17 PROCEDURE — 3077F SYST BP >= 140 MM HG: CPT | Mod: CPTII,S$GLB,, | Performed by: INTERNAL MEDICINE

## 2022-05-17 PROCEDURE — 99214 OFFICE O/P EST MOD 30 MIN: CPT | Mod: S$GLB,,, | Performed by: INTERNAL MEDICINE

## 2022-05-17 PROCEDURE — 99214 PR OFFICE/OUTPT VISIT, EST, LEVL IV, 30-39 MIN: ICD-10-PCS | Mod: S$GLB,,, | Performed by: INTERNAL MEDICINE

## 2022-05-17 PROCEDURE — 3080F DIAST BP >= 90 MM HG: CPT | Mod: CPTII,S$GLB,, | Performed by: INTERNAL MEDICINE

## 2022-05-17 PROCEDURE — 1160F RVW MEDS BY RX/DR IN RCRD: CPT | Mod: CPTII,S$GLB,, | Performed by: INTERNAL MEDICINE

## 2022-05-17 PROCEDURE — 99999 PR PBB SHADOW E&M-EST. PATIENT-LVL III: CPT | Mod: PBBFAC,,, | Performed by: INTERNAL MEDICINE

## 2022-05-17 PROCEDURE — 3008F BODY MASS INDEX DOCD: CPT | Mod: CPTII,S$GLB,, | Performed by: INTERNAL MEDICINE

## 2022-05-17 PROCEDURE — 3080F PR MOST RECENT DIASTOLIC BLOOD PRESSURE >= 90 MM HG: ICD-10-PCS | Mod: CPTII,S$GLB,, | Performed by: INTERNAL MEDICINE

## 2022-05-17 PROCEDURE — 3008F PR BODY MASS INDEX (BMI) DOCUMENTED: ICD-10-PCS | Mod: CPTII,S$GLB,, | Performed by: INTERNAL MEDICINE

## 2022-05-17 PROCEDURE — 80053 COMPREHEN METABOLIC PANEL: CPT | Mod: 91 | Performed by: INTERNAL MEDICINE

## 2022-05-17 NOTE — TELEPHONE ENCOUNTER
Please call and notify pt:    Your cholesterol is high. All your risk factors added together indicates that you should be started on a cholesterol medication to decrease the risk of a future cardiovascular event (such as stroke, heart attack, etc). I would like to start you on a medication called rosuvastatin (crestor) 10mg nightly (I'm starting on a low dose and may eventually have to go up on the dosage). Most common side effects include some nausea and upset stomach and muscle cramps. It can potentially worsen your glucose a little. Rare but serious side effects include liver issues and muscle breakdown--let pt know that I checked w/ her rheumatologist who says ok to start b/c in the past she's been hesitant due to her myositis.     If you are ok with it, let me know and I'll send the script to the pharmacy. And please follow up with me in 3 months to recheck the cholesterol and liver function.

## 2022-05-17 NOTE — PROGRESS NOTES
Answers for HPI/ROS submitted by the patient on 5/16/2022  fever: No  eye redness: No  mouth sores: No  headaches: No  shortness of breath: No  chest pain: No  trouble swallowing: No  diarrhea: No  constipation: Yes  unexpected weight change: No  genital sore: No  dysuria: No  During the last 3 days, have you had a skin rash?: No  Bruises or bleeds easily: No  cough: No

## 2022-05-17 NOTE — PROGRESS NOTES
RHEUMATOLOGY CLINIC FOLLOW UP VISIT  Chief complaints:-  To follow up for Myositis follow up     HPI:-  Ermelinda Javed a 61 y.o. pleasant female  with history of L sided infiltrating ductal carcinoma of the L breast (dx on Jan 2017), HTN, depression, gastritis, and recently diagnosed myositis (uncertain if it's autoimmune vs medication induced), present today with concern about myositis flare     Patient was initially send from hem/onc clinic for evaluation of possible inflammatory myositis.  Patient was hospitalized from 1/22/19 till 2/13/19 for myositis.      L breast cancer s/p completion of 4 cycles of demi-adjuvant taxotere and cytoxan (completed on 5/9/17) and mastectomy (6/26/17) and then 4 cycles of adjuvant Adriamycin (completed 9/12/17). In 10/2017 - patient developed L supraclavicular lymphadenopathy which FNA confirmed as reoccurrence of previously treated breast cancer.      Patient started on Atelizumab/Abraxane on 11/7/18. Per chart review, patient noticed rashes on the dorsum of her hands on 11/11/18. Seen in urgent care and given topical steroid cream. Then received two more infusions on Atelizumab/Abraxane on 11/21/18 and 12/5/18. Started to notice puffiness around the eyes on 12/11/18. Received another Abraxane infusion on 12/12/18 but this time with hydrocortisone 50 mg which helped reduce the swelling around the eyes. Developed swelling of the face again on 12/15/18. Next infusion of Abraxane done on 12/19/18 with Solucortef and Atelizumab held. Abraxane given again on 1/3/19 with no IV steroid. Patient developed swelling of the face on 1/4/19 and went to urgent care and given short course of prednisone 20 mg BID. On 1/15/19, patient seen in hem/onc clinic with c/o of pressure and tightness around neck. CT scan of chest and neck did not reveal any vascular compression. Started on prednisone 60 mg with taper. On 1/22/18 patient with c/o  proximal muscle weakness. CPK found to be elevated around 4k. Patient admitted and given solumedrol 80 mg IV on 1/22/18. Last infusion of Atelizumab on 12/19/18. Last infusion of Abraxane on 1/3/19. Given Solumedrol 1g x 1 on 1/23/18. Seen by Dermatology on 1/24/18 and biopsy done of skin rash. Given distribution of rashes, there was concern for dermatomyositis. Patient started on Solumedrol 125 mg IV BID at this time.      During her hospitalization patient was started on high dose steroid Solumedrol 80mg IV x 1, 1g x 1, and then 125mg BID until tapered down to medrol 48mg.  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  Patient was started on IVIG (400mg/kg/daily x 5 day) 1/29/19-2/2.   Patient started on MTX 20mg SQ (Feb 5 2019) with folic acid. MTX SQ was transitioned to PO because concern about rehab administration.  Patient failed swallow eval and PEG tube was placed.        Denies any family history of autoimmune diseases.     No smoking, EtOH, recreational drug usage.     Denies any photosensitivity, joint swelling, unintentional weigh loss, abdominal pain, night sweats, CP, SOB.  +oral thrush.      Completed rehab at [facility].  Completed IVIG (2/28 and 3/1).  Had mild HA after infusion.  Doing well.  A lot stronger - able to do household chores since discharge.  Not back at baseline yet ~80%.  Mild weakness with increased activities.  Able to ambulate without assistance (but is still a fall precaution).  Tolerating diet via PEG tube.  Completed rehab.      MTX discontinued 2/18 with concern of toxicity (decrease blood counts and transaminatis).  Folic acid increased to 4mg daily.  Still on medrol 32mg daily with atorvaquone for PCP prophylaxis.  Bactrim d/c because of interaction with leucovorin.  Leucovorin 5mg daily started - Leucovorin discontinued.  Continues to be on folic acid 4mg daily     Radiation completed (28 days) completed May 8.       EGD/colonoscopy done on July 29 - normal.  PEG  tube removed     Last PET scan (June 20) showed negative for metastasis.  At this time, will monitor per oncology and repeat PET scan in Sept.  No treatment needed at this time.      Rash was biopsied and evaluated by dermatology.   Biopsy report conclusive of dermatomyositis.  Was given topical steroid which provided minimal alleviation of symptoms.  +paco      6/2020    Completed Rituxan on 4/27 (1g x 2).  Had a flare after infusion requiring increase of steroid.  Since then patient had been doing well - improving of myalgia and rash.  Still having mild edema and generalized weakness (especially in the afternoon/evening).       Patient was started on HCQ - compliant on all home medications.  Continues to work out daily.  Finds most difficulty with getting in and out of the car.   Denies any dysphagia, alopecia, arthralgia, abdominal pain, CP, SOB, N/V, chills, or urinary symptoms.      7/2020    Rash all over her body  Red rash on the left leg  Face once again has erythema and heliotrope rash  Prednisone down to 10 mg and looks like she has a flare again  On plaquenil 200 mg bid  Wears sunscreen  Avoids the direct sun    Most recent labs     Ck nml  Aldolase nml  ESR 53  CRP nml  Mild anemia  AST 67      9/2020    She started xeljanz 5 mg bid mid august  She feels well  Swelling is better  Her weakness and fatigue is all gone  Face still looks red   Chest is all better and upper arm is great  Itch and rash seems better   Rash on the legs is gone     CK,aldolase nml  CRP nml   now   AST 61  GFR nml  ALT nml    plts 145 but stable  H/h 11.6/35.3    But the white count has dropped to 2.70    It has been low on and off since 2019 so this is not new     She has a sinus issue going on   She has greenish d/c  She has a tinge of blood       10/12/2020  We cut the xeljanz dose to 5 mg daily  No more swelling  Face looks clear,not much redness  The eyelid is not puffy and not intensely erythematous   Along the  nasal folds there is some redness but again not intense  On the MCPs and Dorsal hands she has some erythema which is more pinkish  Not itchy  Sometimes these pink areas can turn pigmented  Now complaint to sunscreen      White count has improved from 2.72 to 2.80  H/h 11.3/34.7  plts nml  ANC has improved from 0.6 to 1.4  Lymphocyte count 0.9    2/2021    She has fatigue if she has to do a lot of things  Facial rash is stable  On the hands : redness is stable  Sometimes left ankle and left knee is swollen,painful/tight   Not muscular weakness but she feels skinny  BMI is 29.39 though    White count 4.30  H/H 11.6/35.4  plts 223  Lymphocytes nml    AST went upto 52  Rest of LFTs ok  GFR nml  ESR > 120  CRP nml  CK,aldolase nml    2/2021 we made her xeljanz 7.5 mg daily  Gets IVIG     4/2021  Dermatology said    Patient appears to have 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 No need for addition of systemic steroids with controlled myopathy and otherwise absence of systemic disease due to lack of efficacy in controlling cutaneous disease, side effect profile.   Currently with evidence of both nonvasculopathic cutaneous disease (heliotrophe, facial rash, Gottron's papules) as well as a degree vasculopathic disease (nailfold vascular changes, evidence digital pulp ulcers/lesions, associated pain at these locations). Overall patient doing well, states QOL not greatly affected by residual rashes.      Considerations for comorbid rosacea/seb derm at face (micropustules on exam today; some scaling at brows, nasal creases noted).      Recommendations:  - Strict sun protection measures always.   - Optimization of topicals as above: Elidel mixed 50/50 with ketoconazole cream BID PRN rash at face; Plexion wash daily at face; transition to Diprolene cream for rash at hands.   - Offered ILK to rash at hands, patient again declined.   - Consideration for  addition of vasodilatory agent (e.g. nifedipine) for relief in associated pain at fingertip lesions.   - Otherwise c/w rheumatologic management with IVIG, tofacitinib.      Labs 4/2021    H/h 11.3/36.2  nml white count 5.27  plts nml    5/2021    Patient would like to consider steroid/intralesional kenalog injections  She had stopped xeljanz on 4/28 for the covid vaccine   White count was done 2 days after 4/28 which is on 4/30 and the count was 5.27  She got covid vaccine on 3/29   Stopped xeljanz till 4/6  Did the blood work on 4/8 and was on xeljanz at that time  So her white count was 3.80  Prior to that she was 4.30 and 5.19    CMP nml    10/2021    She is doing really well  She has not needed steroid creams or injections  Skin looks really good  She is not puffy,she is not weak    Left foot has a stabbing pain after she walks around for some time  Some aches and pains    8/2021    CRP 15.8  ESR > 130  She had sinus issues at this time  Ck,aldolase nml  AST still 51  ALT nml  GFR nml  H/h 11.6/34.7  nml white count,4.90  nml plts  GGT nml  Vit d nml    1/2022    She is doing really really well  No more red and pink rashes  No more edema  On xeljanz 5 mg daily/was on 7.5 mg daily   Insurance is denying-so appeal     Labs     Ck,aldolase nml  CRP nml  ESR 67  CMP nml  CBC nml      5/2022    She took xeljanz 5 mg daily for 2 to 3 months and then didn't have medication for a month- thankfully has not flared in the skin and muscles  Today's labs are pending   IVIG - is still monthly       Review of Systems   Constitutional: Negative for chills, diaphoresis, fever, malaise/fatigue and weight loss.   HENT: Negative for congestion, ear discharge, ear pain, hearing loss, nosebleeds, sinus pain and tinnitus.    Eyes: Positive for discharge. Negative for photophobia, pain and redness.   Respiratory: Negative for cough, hemoptysis, sputum production, shortness of breath, wheezing and stridor.    Cardiovascular: Negative  for chest pain, palpitations, orthopnea, claudication, leg swelling and PND.   Gastrointestinal: Positive for constipation. Negative for abdominal pain, diarrhea, heartburn, nausea and vomiting.   Genitourinary: Negative for dysuria, frequency, hematuria and urgency.   Musculoskeletal: Positive for myalgias. Negative for back pain, joint pain and neck pain.   Skin: Positive for rash.   Neurological: Negative for dizziness, tingling, tremors, weakness and headaches.   Endo/Heme/Allergies: Does not bruise/bleed easily.   Psychiatric/Behavioral: Negative for depression, hallucinations and suicidal ideas. The patient is not nervous/anxious and does not have insomnia.        Past Medical History:   Diagnosis Date    Anemia 2019    Anxiety 2018    Breast cancer 2017    left    Depression     Dermatomyositis     Diverticulosis     Erosive esophagitis     Gastritis     Hepatic cyst     Hypertension     Immune disorder 2019    Joint pain 2019    Keloid cicatrix 2005    Thyroiditis     Vitamin B12 deficiency 3/8/2018       Past Surgical History:   Procedure Laterality Date    BREAST BIOPSY Left     BREAST RECONSTRUCTION Left 2017     SECTION      COLONOSCOPY  2011    repeat in 10 yrs.    COLONOSCOPY N/A 2019    Procedure: COLONOSCOPY;  Surgeon: Pernell Rosas MD;  Location: [facility] (4TH FLR);  Service: Endoscopy;  Laterality: N/A;  Patient would like to use her PEG tube for bowel prep and then have her PEG tube removed after EGD and colonoscopy procedures while she is still sedated.    D&C      ESOPHAGOGASTRODUODENOSCOPY N/A 2019    Procedure: EGD (ESOPHAGOGASTRODUODENOSCOPY);  Surgeon: Pernell Rosas MD;  Location: [facility] (2ND FLR);  Service: Endoscopy;  Laterality: N/A;    ESOPHAGOGASTRODUODENOSCOPY N/A 2019    Procedure: EGD (ESOPHAGOGASTRODUODENOSCOPY);  Surgeon: Pernell Rosas MD;  Location: [facility] (4TH FLR);  Service: Endoscopy;  Laterality: N/A;  EGD for follow-up  "of erosive esophagitis per Dr. Rosas    Patient would like to use her PEG tube for bowel prep and then have her PEG tube removed after EGD and colonoscopy procedures while she is still sedated.    INSERTION OF TUNNELED CENTRAL VENOUS CATHETER (CVC) WITH SUBCUTANEOUS PORT Right 10/31/2018    Procedure: [id number];  Surgeon: Deo Palencia MD;  Location: [facility] OR [address] [place] [address];  Service: General;  Laterality: Right;    MASTECTOMY Left 06/26/2017    MYOMECTOMY      PORTACATH PLACEMENT      SENTINEL LYMPH NODE BIOPSY  02/2017    left    SKIN BIOPSY  2019    TOTAL REDUCTION MAMMOPLASTY Right 2017    UPPER GASTROINTESTINAL ENDOSCOPY          Social History     Tobacco Use    Smoking status: Never Smoker    Smokeless tobacco: Never Used   Substance Use Topics    Alcohol use: Not Currently     Alcohol/week: 1.0 standard drink     Types: 1 Glasses of wine per week     Comment: rare    Drug use: No       Family History   Problem Relation Age of Onset    Heart disease Father     Hypertension Father     Breast cancer Sister 52    Hypertension Mother     No Known Problems Brother     Thyroid disease Daughter         hyperthyroidism s/p thyroidectomy    No Known Problems Son     No Known Problems Brother     No Known Problems Brother     No Known Problems Sister     No Known Problems Daughter     Colon cancer Neg Hx     Ovarian cancer Neg Hx     Celiac disease Neg Hx     Cirrhosis Neg Hx     Colon polyps Neg Hx     Esophageal cancer Neg Hx     Inflammatory bowel disease Neg Hx     Liver cancer Neg Hx     Liver disease Neg Hx     Rectal cancer Neg Hx     Stomach cancer Neg Hx     Ulcerative colitis Neg Hx     Melanoma Neg Hx        Review of patient's allergies indicates:  No Known Allergies    Vitals:    05/17/22 1052   BP: (!) 148/91   Pulse: 87   Weight: 84.6 kg (186 lb 8.2 oz)   Height: 5' 5" (1.651 m)   PainSc:   1       Physical Exam  HENT:      Head: Normocephalic and atraumatic. "   Eyes:      Pupils: Pupils are equal, round, and reactive to light.   Cardiovascular:      Rate and Rhythm: Normal rate and regular rhythm.      Heart sounds: Normal heart sounds.   Pulmonary:      Effort: Pulmonary effort is normal.      Breath sounds: Normal breath sounds.   Abdominal:      General: Bowel sounds are normal.      Palpations: Abdomen is soft.   Musculoskeletal:         General: Normal range of motion.      Cervical back: Normal range of motion and neck supple.   Skin:     General: Skin is warm and dry.      Findings: No erythema or rash.      Comments: guttron's papules on bilateral hands   heliotropic rash      Neurological:      Mental Status: She is alert and oriented to person, place, and time.      Gait: Gait is intact.   Psychiatric:         Mood and Affect: Mood and affect normal.         Cognition and Memory: Memory normal.         Judgment: Judgment normal.       Digital pitting +    Labs:  Results for ROMEO PEREZ (MRN [id number]) as of 6/17/2020 10:12   Ref. Range 4/22/2020 11:51 4/24/2020 11:43 5/25/2020 09:34 6/10/2020 11:25 6/10/2020 11:26   WBC Latest Ref Range: 3.90 - 12.70 K/uL 4.30  3.60 (L) 4.60    RBC Latest Ref Range: 4.00 - 5.40 M/uL 4.49  4.52 4.47    Hemoglobin Latest Ref Range: 12.0 - 16.0 g/dL 12.1  12.5 12.4    Hematocrit Latest Ref Range: 37.0 - 48.5 % 38.2  38.7 38.4    MCV Latest Ref Range: 82 - 98 fL 85  86 86    MCH Latest Ref Range: 27.0 - 31.0 pg 27.0  27.7 27.8    MCHC Latest Ref Range: 32.0 - 36.0 g/dL 31.8 (L)  32.4 32.4    RDW Latest Ref Range: 11.5 - 14.5 % 16.0 (H)  17.2 (H) 17.3 (H)    Platelets Latest Ref Range: 150 - 350 K/uL 157  206 164    MPV Latest Ref Range: 7.4 - 10.4 fL 7.3 (L)  7.0 (L) 7.2 (L)    Gran% Latest Ref Range: 38.0 - 73.0 % 66.0  62.8 66.2    Gran # (ANC) Latest Ref Range: 1.8 - 7.7 K/uL 2.8  2.2 3.0    Lymph% Latest Ref Range: 18.0 - 48.0 % 15.3 (L)  15.5 (L) 14.6 (L)    Lymph # Latest Ref Range: 1.0 - 4.8 K/uL 0.7 (L)  0.6 (L) 0.7  (L)    Mono% Latest Ref Range: 4.0 - 15.0 % 11.2  17.7 (H) 14.2    Mono # Latest Ref Range: 0.3 - 1.0 K/uL 0.5  0.6 0.6    Eosinophil% Latest Ref Range: 0.0 - 8.0 % 6.5  3.1 3.9    Eos # Latest Ref Range: 0.0 - 0.5 K/uL 0.3  0.1 0.2    Basophil% Latest Ref Range: 0.0 - 1.9 % 1.0  0.9 1.1    Baso # Latest Ref Range: 0.00 - 0.20 K/uL 0.00  0.00 0.00    nRBC Latest Ref Range: 0 /100 WBC 0  0 0    Differential Method Unknown Automated  Automated Automated    Sed Rate Latest Ref Range: 0 - 30 mm/Hr 56 (H)  91 (H)  73 (H)   Sodium Latest Ref Range: 136 - 145 mmol/L 135 (L)  139 137    Potassium Latest Ref Range: 3.5 - 5.1 mmol/L 3.8  3.8 3.8    Chloride Latest Ref Range: 95 - 110 mmol/L 98  102 102    CO2 Latest Ref Range: 23 - 29 mmol/L 31 (H)  29 29    BUN, Bld Latest Ref Range: 7 - 17 mg/dL 17  12 13    Creatinine Latest Ref Range: 0.70 - 1.20 mg/dL 0.70  0.60 (L) 0.70    eGFR if non African American Latest Ref Range: >60 mL/min/1.73 m^2 >60  >60 >60    eGFR if  Latest Ref Range: >60 mL/min/1.73 m^2 >60  >60 >60    Glucose Latest Ref Range: 74 - 106 mg/dL 114 (H)  89 93    Calcium Latest Ref Range: 8.4 - 10.2 mg/dL 9.8  9.8 9.7    Alkaline Phosphatase Latest Ref Range: 38 - 145 U/L 65  63 58    PROTEIN TOTAL Latest Ref Range: 6.3 - 8.2 g/dL 9.3 (H)  8.9 (H) 8.4 (H)    Albumin Latest Ref Range: 3.5 - 5.2 g/dL 4.1  4.2 4.1    BILIRUBIN TOTAL Latest Ref Range: 0.2 - 1.3 mg/dL 0.4  0.5 0.4    AST Latest Ref Range: 14 - 36 U/L 85 (H)  64 (H) 62 (H)    ALT Latest Ref Range: 10 - 44 U/L 28  26 22    CRP Latest Ref Range: 0.0 - 10.0 mg/L <5.0  <5.0 <5.0    CPK Latest Ref Range: 30 - 135 U/L 115  79 101    Hemoglobin A1C External Latest Ref Range: 4.0 - 6.0 %     6.0   SARS-CoV2 (COVID-19) Qualitative PCR Latest Ref Range: Not Detected   Not Detected      Aldolase Latest Ref Range: < OR = 8.1 U/L 5.9  5.5  4.7     Medication List with Changes/Refills   Current Medications    AZELASTINE (ASTELIN) 137 MCG (0.1 %)  NASAL SPRAY    1 spray (137 mcg total) by Nasal route 2 (two) times daily.    CALCIUM CARBONATE (OS-ESTELA) 600 MG CALCIUM (1,500 MG) TAB    Take 600 mg by mouth 2 (two) times daily with meals.    DICLOFENAC SODIUM (VOLTAREN) 1 % GEL    Apply 2 g topically 4 (four) times daily. Apply to areas of left ankle pain    KETOCONAZOLE (NIZORAL) 2 % SHAMPOO    Wash scalp and ears with medicated shampoo at least 2x/week - let sit at least 5 minutes prior to rinsing    LEVOCETIRIZINE (XYZAL) 5 MG TABLET    TAKE 1 TABLET(5 MG) BY MOUTH EVERY EVENING    MAGNESIUM 30 MG TAB    Take by mouth once.    MULTIVITAMIN CAPSULE    Take 1 capsule by mouth once daily.    NIFEDIPINE (PROCARDIA-XL) 60 MG (OSM) 24 HR TABLET    Take 1 tablet (60 mg total) by mouth once daily.    TOFACITINIB (XELJANZ) 5 MG TAB    Alternate taking 1 tablet by mouth every other day with taking 2 tablets by mouth every other day.    VITAMIN D (VITAMIN D3) 1000 UNITS TAB    Take 1,000 Units by mouth once daily.   Discontinued Medications    PROMETHAZINE-CODEINE 6.25-10 MG/5 ML (PHENERGAN WITH CODEINE) 6.25-10 MG/5 ML SYRUP    Take 5 mLs by mouth nightly as needed for Cough.    SULFACETAMIDE SODIUM-SULFUR 10-5 % (W/W) CLSR    Wash on face 1-2 times a day       Assessment/Plans:-  59 y.o.F with history of L sided infiltrating ductal carcinoma of the L breast (dx on Jan 2017), HTN, depression, gastritis, and recently diagnosed myositis (uncertain if it's autoimmune vs medication induced), present today for follow up because of concern about myositis flare.     Patient started on Atelizumab/Abraxane on 11/7/18.  Patient developed swelling of the face on 1/4/19 and went to urgent care and given short course of prednisone 20 mg BID. On 1/15/19, patient seen in hem/onc clinic with c/o of pressure and tightness around neck. CT scan of chest and neck did not reveal any vascular compression. Started on prednisone 60 mg with taper. On 1/22/18 patient with c/o proximal muscle  weakness. CPK found to be elevated around 4k. Patient admitted and given solumedrol 80 mg IV on 1/22/18. Last infusion of Atelizumab on 12/19/18. Last infusion of Abraxane on 1/3/19. Given Solumedrol 1g x 1 on 1/23/18. Seen by Dermatology on 1/24/18 and biopsy done of skin rash. Given distribution of rashes, there was concern for dermatomyositis. Patient started on Solumedrol 125 mg IV BID at that time.  Skin biopsy resulted consistent with dermatomyositis.     Labs: CPK and Aldolase normalized. +DARCY 1:640 speckled with negative profile.  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 quantTB: indeterminate, T-spot: negative     Spirometry: normal, normal diffusion capacity. FVC: 81%, DLCO: 114%     Patient exhibits signs of dermatomyositis (heliotropic rash and gottron's papules).   Dermatomyositis can be associated with malignancy.  MRI of LUE showed edema of the deltoid and biceps.  Exam with proximal muscle weakness: b/l deltoids 4/5, b/l iliopsoas 4.4/5. 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   Given +TIF1 gamma positivity, most likely dermatomyositis is from underlying malignancy.      Failed 2nd swallow eval on 2/6/19. PEG placed 2/7/2019.     Current medications:  - prednisone 20mg   - IVIG Started Jan 2019 - last dose April 7  - Rituxan 1000mg x 2 (last dose April 27)  - HCQ 200mg BID      Esophageal biopsy - negative for viral infection.  Nonspecific chronic inflammation.     EGD/Colonoscopy (July 2019) - normal. PEG tube removed      Fiboscan done Aug 2019 - showed fatty liver with no  scarring/damage.,      Failed Cellcept - worsening leukopenia, elevated LFTs, and other side effects without improvement of symptoms      Recent studies demonstrated increased efficacy in IVIG when spaced out (Q2w instead of Q4w)     Patient is an intermediate metabolizer based on TPMT.  Cannot start imuran.  Labs still neutropenic and thrombocytopenic at this time - uncertain of the cause (radiation induce BM fibrosis vs medication induce?)      Vaccination completed - Prevnar and Shingles (completed Aug 2019) and flu vaccination for this season (Aug 2019)      Dermatomyositis - currently on Rituxan and Prednisone 20mg daily.  Tolerating well and improvement of symptoms.  Plan is taper steroid and continue Rituxan 1000mg x 2 every 6 months.     It appears that patient continued to fail steroid tapering on multiple occasion.  Patient is uptodate on all CA screening - next PET scan is July 2020.  Other consideration for continued myalgia includes neurological condition such as MG.  Will other MG panel and referral to neurology.     If patient continues to failed steroid tapering - consideration for tacrolimus + IVIG, Xeljanz, plasmapharesis/plasma exchange.       My addendum last time    60 year old female with dermatomyositis s/p PD1 inhibitor used for breast cancer  TIF1 gamma +    Low dose steroids didn't help    Rash+ muscle weakness+dysphagia     IVIG and steroids initial treatment    mtx caused LFT derangement,anemia and leukopenia   She didn't respond to IVIG, initially we gave IVIG 2 g/kg every 4 weeks and then 1 g/kg bw ever 2 weeks : poor response   She was on cellcept 500 mg daily and she had cytopenias   Imuran not an option since she is intermediate metaboliser     She had significant periorbital edema,rash on the neck and thighs    We didn't think she was responding to the treatment     So we gave her rituximab 1000 mg x 2    She did well and then she flared again may 2020    We had to go up on the dose  to 30 mg and then tapered it to 20 mg  We started plaquenil    CT chest abdomen and pelvis nml  Colonoscopy nml  PET lymphadenopathy  Echo nml    Myasthenia gravis panel negative     Last time she needed prednisone 10 mg     She was weak and her skin had flared    Refractory dermatomyositis    We resumed IVIG and xeljanz 5 mg bid     She had done really well        9/2020    She started xeljanz 5 mg bid mid august  She feels well  Swelling is better  Her weakness and fatigue is all gone  Face still looks red   Chest is all better and upper arm is great  Itch and rash seems better   Rash on the legs is gone     CK,aldolase nml  CRP nml   now   AST 61  GFR nml  ALT nml    plts 145 but stable  H/h 11.6/35.3    But the white count has dropped to 2.70    It has been low on and off since 2019 so this is not new     Her lymphocyte count is 900 cells/mm3    Lymphopenia    In the controlled clinical trials, confirmed decreases in absolute lymphocyte counts below 500 cells/mm3 occurred in 0.04% of patients for the 5 mg twice daily and 10 mg twice daily XELJANZ groups combined during the first 3 months of exposure.    Confirmed lymphocyte counts less than 500 cells/mm3 were associated with an increased incidence of treated and serious infections      Her neutrophil count is 1300 cells/mm3    Neutropenia    In the controlled clinical trials, confirmed decreases in ANC below 1000 cells/mm3 occurred in 0.07% of patients for the 5 mg twice daily and 10 mg twice daily XELJANZ groups combined during the first 3 months of exposure.    There were no confirmed decreases in ANC below 500 cells/mm3 observed in any treatment group.    There was no clear relationship between neutropenia and the occurrence of serious infections.    In the long-term safety population, the pattern and incidence of confirmed decreases in ANC remained consistent with what was seen in the controlled clinical trials        Lymphocyte  Abnormalities    Treatment with XELJANZ was associated with initial lymphocytosis at one month of exposure followed by a gradual decrease in mean absolute lymphocyte counts below the baseline of approximately 10% during 12 months of therapy. Lymphocyte counts less than 500 cells/mm3 were associated with an increased incidence of treated and serious infections.    Avoid initiation of XELJANZ/XELJANZ XR treatment in patients with a low lymphocyte count (i.e., less than 500 cells/mm3). In patients who develop a confirmed absolute lymphocyte count less than 500 cells/mm3, treatment with XELJANZ/XELJANZ XR is not recommended.    Monitor lymphocyte counts at baseline and every 3 months thereafter.       Neutropenia    Treatment with XELJANZ was associated with an increased incidence of neutropenia (less than 2000 cells/mm3) compared to placebo.    Avoid initiation of XELJANZ/XELJANZ XR treatment in patients with a low neutrophil count (i.e., ANC less than 1000 cells/mm3). For patients who develop a persistent ANC of 500 to 1000 cells/mm3, interrupt XELJANZ/XELJANZ XR dosing until ANC is greater than or equal to 1000 cells/mm3. In patients who develop an ANC less than 500 cells/mm3, treatment with XELJANZ/XELJANZ XR is not recommended.    Monitor neutrophil counts at baseline and after 4-8 weeks of treatment and every 3 months thereafter.     Anemia    Avoid initiation of XELJANZ/XELJANZ XR treatment in patients with a low hemoglobin level (i.e., less than 9 g/dL). Treatment with XELJANZ/XELJANZ XR should be interrupted in patients who develop hemoglobin levels less than 8 g/dL or whose hemoglobin level drops greater than 2 g/dL on treatment.    So at this point we decided to continue the xeljanz 5 mg bid and IVIG     But we sent her to hematology and the counts further dropped     10/12/2020  We cut the xeljanz dose to 5 mg daily  No more swelling  Face looks clear,not much redness  The eyelid is not puffy and not  intensely erythematous   Along the nasal folds there is some redness but again not intense  On the MCPs and Dorsal hands she has some erythema which is more pinkish  Not itchy  Sometimes these pink areas can turn pigmented  Now complaint to sunscreen      White count has improved from 2.72 to 2.80  H/h 11.3/34.7  plts nml  ANC has improved from 0.6 to 1.4  Lymphocyte count 0.9    Ck,aldolase nml  CRP nml  ESR 65      2/2021  Now completely off prednisone    Some hot flashes at nights  Not documented temperatures/some night sweats  Recent cancer screening negative  Ct chest,abdomen and pelvis and chest    She has fatigue if she has to do a lot of things  Facial rash is stable  On the hands : redness is stable  Sometimes left ankle and left knee is swollen,painful/tight   Not muscular weakness but she feels skinny  BMI is 29.39 though    White count 4.30  H/H 11.6/35.4  plts 223  Lymphocytes nml    AST went upto 52  Rest of LFTs ok  GFR nml  ESR > 120  CRP nml  CK,aldolase nml        2/2021 we made her xeljanz 7.5 mg daily  Gets IVIG     4/2021  Dermatology said    Patient appears to have 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 No need for addition of systemic steroids with controlled myopathy and otherwise absence of systemic disease due to lack of efficacy in controlling cutaneous disease, side effect profile.   Currently with evidence of both nonvasculopathic cutaneous disease (heliotrophe, facial rash, Gottron's papules) as well as a degree vasculopathic disease (nailfold vascular changes, evidence digital pulp ulcers/lesions, associated pain at these locations). Overall patient doing well, states QOL not greatly affected by residual rashes.      Considerations for comorbid rosacea/seb derm at face (micropustules on exam today; some scaling at brows, nasal creases noted).      Recommendations:  - Strict sun protection measures always.   -  Optimization of topicals as above: Elidel mixed 50/50 with ketoconazole cream BID PRN rash at face; Plexion wash daily at face; transition to Diprolene cream for rash at hands.   - Offered ILK to rash at hands, patient again declined.   - Consideration for addition of vasodilatory agent (e.g. nifedipine) for relief in associated pain at fingertip lesions.   - Otherwise c/w rheumatologic management with IVIG, tofacitinib.      Labs 4/2021    H/h 11.3/36.2  nml white count 5.27  plts nml    5/2021    Patient would like to consider steroid/intralesional kenalog injections  She had stopped xeljanz on 4/28 for the covid vaccine   White count was done 2 days after 4/28 which is on 4/30 and the count was 5.27  She got covid vaccine on 3/29   Stopped xeljanz till 4/6  Did the blood work on 4/8 and was on xeljanz at that time  So her white count was 3.80  Prior to that she was 4.30 and 5.19    CMP nml      5/2021  She has shown remarkable improvement   She used to be very erythematous on the face and the hands,but that has significantly resolved  She had rashes on the chest,upper back and legs and that has resolved  She had calcinosis and that has resolved  She has no muscle weakness  All she has is mild erythema on the forehead,nose,around the nasolabial fold,heliotrope rash on the eyelids,gottrons papules on the MCPs and PIPs   She has digital pitting with no active ulcers  She has nail fold capillary changes     10/2021    She is doing really well  She has not needed steroid creams or injections  Skin looks really good  She is not puffy,she is not weak    Left foot has a stabbing pain after she walks around for some time  Some aches and pains-exam nml  May be neuropathy    8/2021    CRP 15.8  ESR > 130  She had sinus issues at this time  Ck,aldolase nml  AST still 51  ALT nml  GFR nml  H/h 11.6/34.7  nml white count,4.90  nml plts  GGT nml  Vit d nml    1/2022    She is doing really really well  No more red and pink  rashes  No more edema  On xeljanz 5 mg daily/was on 7.5 mg daily   Insurance is denying-so appeal   Patient really needs to continue the current regimen since she has done really well,she has tried and failed many other drugs and stopping xeljanz will only cause flare up of the disease    Labs     Ck,aldolase nml  CRP nml  ESR 67  CMP nml  CBC nml      5/2022    She took xeljanz 5 mg daily for 2 to 3 months and then didn't have medication for a month- thankfully has not flared in the skin and muscles  Today's labs are pending - protein high,LFTs nml,CK slowly trending up,CRP mild elevation  IVIG - is still monthly   Exam-no rash and muscle strength normal    Plan     -never had blood clot  -never had diverticulitis    Will just continue xeljanz 5 mg daily    Will cut back IVIG to 1 g /kg not  2 g/kg     Off steroids    Will wait for 3 months-make sure she has stable disease    Dermatology -f/u    Sun screen overtly emphasised    - continue muscle strengthening exercise daily    CBC,CMP,ESR,CRP,ck,aldolase in 3 months    Follow up in 3 months     I had referred her to rheum-derm clinic,but looks like she is stable enough to be seen by me and derm separately    Vaccines  Covid x 2  Booster utd  4th one -5 months after the last dose  Flu utd  Pneumonia utd  Shingles     DXA-osteopenia     - 1200 mg dietary calcium and Vitamin D3 1000 mg daily        Answers for HPI/ROS submitted by the patient on 5/16/2022  mouth sores: No  trouble swallowing: No  unexpected weight change: No  genital sore: No

## 2022-05-18 ENCOUNTER — TELEPHONE (OUTPATIENT)
Dept: PRIMARY CARE CLINIC | Facility: CLINIC | Age: 62
End: 2022-05-18
Payer: MEDICARE

## 2022-05-18 RX ORDER — ROSUVASTATIN CALCIUM 10 MG/1
10 TABLET, COATED ORAL DAILY
Qty: 90 TABLET | Refills: 3 | Status: SHIPPED | OUTPATIENT
Start: 2022-05-18 | End: 2023-03-10 | Stop reason: SDUPTHER

## 2022-05-18 NOTE — PROGRESS NOTES
Subjective:       Patient ID: Ermelinda Verde is a 61 y.o. female.    Chief Complaint: No chief complaint on file.    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      Her course was complicated by the development of auto-immune dermatomyositis.  She has been on IVIG and tofacitinib.     She is doing well with improvement in her skin and coloration. Some ankle swelling.   She denies any shortness of breath. Her swallowing has been good.         Onc History:  She developed a palpable abnormality in her left breast in January 2017 which she noted on self-examination.  A diagnostic mammogram on January 19 showed a greater than 1 cm nodule in the upper outer portion of left breast.  By ultrasound this was lobulated and hypoechoic measuring 1.75 x 1.51 x 1.96 cm.     On January 24, 2017 a core needle biopsy was performed which showed infiltrating ductal carcinoma, high grade.  The tumor was ER negative, MI negative, and HER-2 negative.  A follow-up ultrasound on December 6 showed 2.5 x 2.2 x 1.5 cm left breast mass.  There was no abnormality noted in the left axilla.     She underwent sentinel lymph node biopsy on February 22.  That showed 4 negative lymph nodes.     She had 4 cycles of  Wilbur-adjuvantTaxotere and Cytoxan completed  on 5/9/17.     On June 26 she underwent left mastectomy.  That revealed 2 foci of invasive high-grade carcinoma measuring 14 mm and 1.5 mm.  Margins were negative.    She completed 4 cycles of adjuvant Adriamycin on September 12, 2017.      In October 2018, she developed some left supraclavicular lymphadenopathy which turned out to be recurrence.     A fine-needle aspirate of the lymph node was performed on October 19th.  That showed metastatic carcinoma consistent with breast primary which was ER negative, MI negative and HER 2-negative.    She then received 3 cycles of Nab paclitaxel and atezolizumab.   She last received treatment on January 3, 2019.    PET-CT in March, 2019 showed complete response.    She then had consolidative radiation therapy in May 2019.    Her treatment has been complicated by the development of dermatomyositis which resulted in the lengthy hospitalization from January 22nd to February 13th, 2019.  She is followed by Rheumatology and has been treated with steroids, IVIG, and low-dose methotrexate, and Cell-cept.  She received rituximab therapy 4/13/2- and 4/27/20.  Review of Systems   Constitutional: Negative for appetite change and unexpected weight change.   HENT: Negative for mouth sores.    Eyes: Negative for visual disturbance.   Respiratory: Negative for cough and shortness of breath.    Cardiovascular: Negative for chest pain.   Gastrointestinal: Negative for abdominal pain, constipation and diarrhea.   Genitourinary: Negative for frequency.   Musculoskeletal: Positive for arthralgias (mild). Negative for back pain.   Skin: Positive for rash ( improved).   Neurological: Negative for weakness and headaches.   Hematological: Negative for adenopathy.   Psychiatric/Behavioral: The patient is not nervous/anxious.        Objective:      Physical Exam  Vitals reviewed.   Constitutional:       General: She is not in acute distress.     Appearance: She is well-developed.   Eyes:      General: No scleral icterus.  Cardiovascular:      Rate and Rhythm: Normal rate and regular rhythm.   Pulmonary:      Effort: Pulmonary effort is normal.      Breath sounds: Normal breath sounds. No wheezing or rales.   Chest:   Breasts:      Right: Normal. No mass, nipple discharge, skin change, axillary adenopathy or supraclavicular adenopathy.      Left: No axillary adenopathy or supraclavicular adenopathy.         Abdominal:      Palpations: Abdomen is soft. There is no mass.      Tenderness: There is no abdominal tenderness.   Lymphadenopathy:      Cervical: No cervical adenopathy.      Upper Body:      Right  upper body: No supraclavicular or axillary adenopathy.      Left upper body: No supraclavicular or axillary adenopathy.   Skin:     Findings: No rash.      Comments: tigtness of skin in chest /neck with post radiation change, left chest wall   Neurological:      Mental Status: She is alert and oriented to person, place, and time.   Psychiatric:         Behavior: Behavior normal.         Thought Content: Thought content normal.         Assessment:       1. Malignant neoplasm of upper-outer quadrant of left breast in female, estrogen receptor negative    2. Polymyositis associated with autoimmune disease      3. Elevated total protein - likely polyclonal inflammatory response  Plan:       CT delayed due to contrast shortage.Reschedule when available.  Mammogram in October - I will see her then.     SPEP with next bloodwork    Route Chart for Scheduling    Med Onc Chart Routing      Follow up with physician . 5 months with mammo   Follow up with STEVIE    Labs CBC and CMP   Lab interval:  SPEP with next scheduled bloodwork (CBC<CMP in Oct)   Imaging CT chest abdomen pelvis and mammogram   CT s when IV contrast is available - so on Hold for now; Mammo in October and see me   Pharmacy appointment    Other referrals

## 2022-05-18 NOTE — TELEPHONE ENCOUNTER
----- Message from Emily Guaman sent at 5/18/2022  1:49 PM CDT -----  Contact: pt [phone]  Patient is returning a phone call.  Who left a message for the patient: Tabby Rios RN   Does patient know what this is regarding: n/a  Would you like a call back, or a response through your [facility] portal?:   call    Please call and advise.    Thank You

## 2022-05-18 NOTE — TELEPHONE ENCOUNTER
Let pt know about cholesterol.  She is agreeable to starting Crestor.at this time. She has a f/u in July with MD for blood pressure.

## 2022-05-19 ENCOUNTER — OFFICE VISIT (OUTPATIENT)
Dept: HEMATOLOGY/ONCOLOGY | Facility: CLINIC | Age: 62
End: 2022-05-19
Payer: MEDICARE

## 2022-05-19 VITALS
BODY MASS INDEX: 30.5 KG/M2 | WEIGHT: 183.06 LBS | HEART RATE: 103 BPM | SYSTOLIC BLOOD PRESSURE: 168 MMHG | OXYGEN SATURATION: 100 % | DIASTOLIC BLOOD PRESSURE: 84 MMHG | HEIGHT: 65 IN | RESPIRATION RATE: 16 BRPM

## 2022-05-19 DIAGNOSIS — M35.9 POLYMYOSITIS ASSOCIATED WITH AUTOIMMUNE DISEASE: ICD-10-CM

## 2022-05-19 DIAGNOSIS — M33.20 POLYMYOSITIS ASSOCIATED WITH AUTOIMMUNE DISEASE: ICD-10-CM

## 2022-05-19 DIAGNOSIS — C50.412 MALIGNANT NEOPLASM OF UPPER-OUTER QUADRANT OF LEFT BREAST IN FEMALE, ESTROGEN RECEPTOR NEGATIVE: Primary | ICD-10-CM

## 2022-05-19 DIAGNOSIS — Z17.1 MALIGNANT NEOPLASM OF UPPER-OUTER QUADRANT OF LEFT BREAST IN FEMALE, ESTROGEN RECEPTOR NEGATIVE: Primary | ICD-10-CM

## 2022-05-19 PROCEDURE — 3077F SYST BP >= 140 MM HG: CPT | Mod: CPTII,S$GLB,, | Performed by: INTERNAL MEDICINE

## 2022-05-19 PROCEDURE — 99999 PR PBB SHADOW E&M-EST. PATIENT-LVL III: ICD-10-PCS | Mod: PBBFAC,,, | Performed by: INTERNAL MEDICINE

## 2022-05-19 PROCEDURE — 3077F PR MOST RECENT SYSTOLIC BLOOD PRESSURE >= 140 MM HG: ICD-10-PCS | Mod: CPTII,S$GLB,, | Performed by: INTERNAL MEDICINE

## 2022-05-19 PROCEDURE — 1159F PR MEDICATION LIST DOCUMENTED IN MEDICAL RECORD: ICD-10-PCS | Mod: CPTII,S$GLB,, | Performed by: INTERNAL MEDICINE

## 2022-05-19 PROCEDURE — 99999 PR PBB SHADOW E&M-EST. PATIENT-LVL III: CPT | Mod: PBBFAC,,, | Performed by: INTERNAL MEDICINE

## 2022-05-19 PROCEDURE — 3008F BODY MASS INDEX DOCD: CPT | Mod: CPTII,S$GLB,, | Performed by: INTERNAL MEDICINE

## 2022-05-19 PROCEDURE — 3079F PR MOST RECENT DIASTOLIC BLOOD PRESSURE 80-89 MM HG: ICD-10-PCS | Mod: CPTII,S$GLB,, | Performed by: INTERNAL MEDICINE

## 2022-05-19 PROCEDURE — 99213 PR OFFICE/OUTPT VISIT, EST, LEVL III, 20-29 MIN: ICD-10-PCS | Mod: S$GLB,,, | Performed by: INTERNAL MEDICINE

## 2022-05-19 PROCEDURE — 3079F DIAST BP 80-89 MM HG: CPT | Mod: CPTII,S$GLB,, | Performed by: INTERNAL MEDICINE

## 2022-05-19 PROCEDURE — 3008F PR BODY MASS INDEX (BMI) DOCUMENTED: ICD-10-PCS | Mod: CPTII,S$GLB,, | Performed by: INTERNAL MEDICINE

## 2022-05-19 PROCEDURE — 99213 OFFICE O/P EST LOW 20 MIN: CPT | Mod: S$GLB,,, | Performed by: INTERNAL MEDICINE

## 2022-05-19 PROCEDURE — 1159F MED LIST DOCD IN RCRD: CPT | Mod: CPTII,S$GLB,, | Performed by: INTERNAL MEDICINE

## 2022-05-24 ENCOUNTER — HOSPITAL ENCOUNTER (OUTPATIENT)
Dept: RADIOLOGY | Facility: HOSPITAL | Age: 62
Discharge: HOME | End: 2022-05-24
Attending: INTERNAL MEDICINE
Payer: MEDICARE

## 2022-05-24 DIAGNOSIS — C50.412 MALIGNANT NEOPLASM OF UPPER-OUTER QUADRANT OF LEFT BREAST IN FEMALE, ESTROGEN RECEPTOR NEGATIVE: ICD-10-CM

## 2022-05-24 DIAGNOSIS — Z17.1 MALIGNANT NEOPLASM OF UPPER-OUTER QUADRANT OF LEFT BREAST IN FEMALE, ESTROGEN RECEPTOR NEGATIVE: ICD-10-CM

## 2022-05-24 PROCEDURE — 74176 CT ABD & PELVIS W/O CONTRAST: CPT | Mod: TC

## 2022-05-24 PROCEDURE — 74176 CT ABDOMEN PELVIS WITHOUT CONTRAST: ICD-10-PCS | Mod: 26,,, | Performed by: RADIOLOGY

## 2022-05-24 PROCEDURE — 74176 CT ABD & PELVIS W/O CONTRAST: CPT | Mod: 26,,, | Performed by: RADIOLOGY

## 2022-05-24 NOTE — PLAN OF CARE
Problem: Adult Inpatient Plan of Care  Goal: Plan of Care Review  Outcome: Ongoing (interventions implemented as appropriate)  Patient AAOX4, up with stand-by assistance to bathroom. Fall precautions maintained;  at bedside. Rheumatology and Dermatology consulted. 1g of solu medrol given today. Edema noted to face, shoulders, upper and lower extremities. MRI of shoulder with contrast outstanding. IVF maintained. Patient free of pain/nausea, stable, and will continue to monitor.       show

## 2022-06-08 ENCOUNTER — INFUSION (OUTPATIENT)
Dept: INFECTIOUS DISEASES | Facility: HOSPITAL | Age: 62
End: 2022-06-08
Attending: INTERNAL MEDICINE
Payer: MEDICARE

## 2022-06-08 VITALS
OXYGEN SATURATION: 98 % | HEART RATE: 102 BPM | BODY MASS INDEX: 31.09 KG/M2 | SYSTOLIC BLOOD PRESSURE: 149 MMHG | WEIGHT: 186.81 LBS | TEMPERATURE: 99 F | DIASTOLIC BLOOD PRESSURE: 77 MMHG

## 2022-06-08 DIAGNOSIS — M33.13 DERMATOMYOSITIS: Primary | ICD-10-CM

## 2022-06-08 DIAGNOSIS — R13.19 ESOPHAGEAL DYSPHAGIA: ICD-10-CM

## 2022-06-08 PROCEDURE — 96365 THER/PROPH/DIAG IV INF INIT: CPT

## 2022-06-08 PROCEDURE — 96366 THER/PROPH/DIAG IV INF ADDON: CPT

## 2022-06-08 PROCEDURE — 25000003 PHARM REV CODE 250: Performed by: INTERNAL MEDICINE

## 2022-06-08 PROCEDURE — 63600175 PHARM REV CODE 636 W HCPCS: Performed by: INTERNAL MEDICINE

## 2022-06-08 PROCEDURE — 96375 TX/PRO/DX INJ NEW DRUG ADDON: CPT

## 2022-06-08 RX ORDER — SODIUM CHLORIDE 0.9 % (FLUSH) 0.9 %
10 SYRINGE (ML) INJECTION
Status: DISCONTINUED | OUTPATIENT
Start: 2022-06-08 | End: 2022-06-08 | Stop reason: HOSPADM

## 2022-06-08 RX ORDER — DIPHENHYDRAMINE HYDROCHLORIDE 50 MG/ML
50 INJECTION INTRAMUSCULAR; INTRAVENOUS
Status: COMPLETED | OUTPATIENT
Start: 2022-06-08 | End: 2022-06-08

## 2022-06-08 RX ORDER — ACETAMINOPHEN 325 MG/1
650 TABLET ORAL
Status: CANCELLED | OUTPATIENT
Start: 2022-07-06

## 2022-06-08 RX ORDER — DIPHENHYDRAMINE HYDROCHLORIDE 50 MG/ML
50 INJECTION INTRAMUSCULAR; INTRAVENOUS
Status: CANCELLED
Start: 2022-07-06

## 2022-06-08 RX ORDER — HEPARIN 100 UNIT/ML
500 SYRINGE INTRAVENOUS
Status: CANCELLED | OUTPATIENT
Start: 2022-07-06

## 2022-06-08 RX ORDER — SODIUM CHLORIDE 0.9 % (FLUSH) 0.9 %
10 SYRINGE (ML) INJECTION
Status: CANCELLED | OUTPATIENT
Start: 2022-07-06

## 2022-06-08 RX ORDER — HEPARIN 100 UNIT/ML
500 SYRINGE INTRAVENOUS
Status: DISCONTINUED | OUTPATIENT
Start: 2022-06-08 | End: 2022-06-08 | Stop reason: HOSPADM

## 2022-06-08 RX ORDER — FAMOTIDINE 10 MG/ML
20 INJECTION INTRAVENOUS
Status: CANCELLED | OUTPATIENT
Start: 2022-07-06

## 2022-06-08 RX ORDER — FAMOTIDINE 10 MG/ML
20 INJECTION INTRAVENOUS
Status: COMPLETED | OUTPATIENT
Start: 2022-06-08 | End: 2022-06-08

## 2022-06-08 RX ORDER — ACETAMINOPHEN 325 MG/1
650 TABLET ORAL
Status: COMPLETED | OUTPATIENT
Start: 2022-06-08 | End: 2022-06-08

## 2022-06-08 RX ADMIN — ACETAMINOPHEN 650 MG: 325 TABLET ORAL at 08:06

## 2022-06-08 RX ADMIN — FAMOTIDINE 20 MG: 10 INJECTION INTRAVENOUS at 08:06

## 2022-06-08 RX ADMIN — DIPHENHYDRAMINE HYDROCHLORIDE 50 MG: 50 INJECTION INTRAMUSCULAR; INTRAVENOUS at 08:06

## 2022-06-08 RX ADMIN — HUMAN IMMUNOGLOBULIN G 40 G: 40 LIQUID INTRAVENOUS at 09:06

## 2022-06-08 RX ADMIN — SODIUM CHLORIDE: 0.9 INJECTION, SOLUTION INTRAVENOUS at 09:06

## 2022-06-08 NOTE — PROGRESS NOTES
Arrived for Privigen infusion 2/2. Tylenol 650 mg po, Pepcid 20 mg IVP, and Benadryl 50 IVP administered 30 minutes after pre-medications. Privigen initiated at the following rates with titration every 30 minutes; 25ml/hr-50ml/hr-101ml/hr-203ml/hr, NS at 25ml/hr concurrently with Privigen. Tolerated without any difficulty.Left unit in NAD.

## 2022-06-16 NOTE — OP NOTE
DATE OF PROCEDURE:  06/26/2017    SURGEON:  Sukhjinder Sewell M.D.    CO-SURGEON: Kamran Khoobehi.    General anesthesia.    PREOPERATIVE DIAGNOSIS:  The patient with a left breast cancer and underwent   nipple sparing mastectomy of the left breast and immediate breast reconstruction   using deep inferior epigastric artery  flap.  The patient's   mastectomy weighed 977 g and the patient's flap from the right side weighed 891   g.    HISTORY OF PRESENT ILLNESS:  The patient comes with diagnosis of breast cancer   in the left side and the patient would like to have a breast reconstruction   after mastectomy and the patient underwent a left nipple-sparing mastectomy with   Dr. Wilekrson, while Dr. Wilkerson was doing mastectomy.  We proceeded with doing a   prisoner flap.    Dr. Dudley started this dissection and assisting him with raising the flap from   the right abdomen and CT was performed to identify the perforators and to use   the lateral row perforators.  The patient had a previous CT angiography but did   not find perforators, so when they biopsied the perforators then proceeded with   dissection through the rectus muscle and rectus sheath, identified the main   pedicle and dissected down until we found a nice pedicle length and size.  At   this time, the surgeon finished with the mastectomy, so we proceeded with   exposing the pectoralis muscle.  The incision was made through the pectoralis   muscle, identified the cartilage and third intercostal space entered and   removing the costochondral and intercostal muscle and then proceeded with   identifying the internal mammary artery and vein.  Then proceeded to bring the   flap and the internal mammary vein coupled to the flap vein using a 2.5  x2 and   then used a Hemoclip to ligate the internal mammary artery and then the double    clips were used to approximate the flap artery and internal mammary   Artery. Under microscope magnification 9-0 BV  100 used to anastomose    In a running fashion  the intermammary artery to the flap artery.  Then, the clamp   was removed and the patient had excellent arterial flow to the flap then   proceeded with removing and deep utilizing the flap and SPY visualization was   performed to utilize to make sure the perfusion to the skin flap and also a   thickened flap and also the flap from the abdomen was adequate.  We then   proceeded with using 2-0 Vicryl to suspend the flap in position with shaping the   flap and also closing the axilla, and then proceeded shaping the flap at the   same time while shaping the breast into the position.  Dr. Dudley closed the   abdominal donor site using a 0 Quill for deep layer.  After the closing the   fascia and then using a EVER drain was placed in position and also Exparel was   injected into the rectus sheath and proceeded with the EVER drain and closed the   deep layer with 0 Quill and 2-0 Quill for superficial layer was applied.  The   patient tolerated the procedure well.  The patient flap skin pedicle used for   Monitoring. Excellent arterial and venous flow was noted at end of the procedure.        /nuzhat [id number] elaine(s)        ROHINI/IN  dd: 09/18/2017 16:44:25 (CDT)  td: 09/18/2017 19:41:41 (CDT)  Doc ID   #[id number]  Job ID #[id number]    CC:      PROVIDER:[[id number]]]

## 2022-07-07 ENCOUNTER — INFUSION (OUTPATIENT)
Dept: INFECTIOUS DISEASES | Facility: HOSPITAL | Age: 62
End: 2022-07-07
Attending: INTERNAL MEDICINE
Payer: MEDICARE

## 2022-07-07 VITALS
RESPIRATION RATE: 18 BRPM | HEART RATE: 98 BPM | TEMPERATURE: 98 F | OXYGEN SATURATION: 99 % | WEIGHT: 190.25 LBS | DIASTOLIC BLOOD PRESSURE: 84 MMHG | BODY MASS INDEX: 31.66 KG/M2 | SYSTOLIC BLOOD PRESSURE: 130 MMHG

## 2022-07-07 DIAGNOSIS — M33.13 DERMATOMYOSITIS: Primary | ICD-10-CM

## 2022-07-07 DIAGNOSIS — R13.19 ESOPHAGEAL DYSPHAGIA: ICD-10-CM

## 2022-07-07 PROCEDURE — 96366 THER/PROPH/DIAG IV INF ADDON: CPT

## 2022-07-07 PROCEDURE — 96365 THER/PROPH/DIAG IV INF INIT: CPT

## 2022-07-07 PROCEDURE — 96375 TX/PRO/DX INJ NEW DRUG ADDON: CPT

## 2022-07-07 PROCEDURE — 25000003 PHARM REV CODE 250: Performed by: INTERNAL MEDICINE

## 2022-07-07 PROCEDURE — 63600175 PHARM REV CODE 636 W HCPCS: Mod: JG | Performed by: INTERNAL MEDICINE

## 2022-07-07 RX ORDER — ACETAMINOPHEN 325 MG/1
650 TABLET ORAL
Status: COMPLETED | OUTPATIENT
Start: 2022-07-07 | End: 2022-07-07

## 2022-07-07 RX ORDER — ACETAMINOPHEN 325 MG/1
650 TABLET ORAL
Status: CANCELLED | OUTPATIENT
Start: 2022-08-04

## 2022-07-07 RX ORDER — SODIUM CHLORIDE 0.9 % (FLUSH) 0.9 %
10 SYRINGE (ML) INJECTION
Status: DISCONTINUED | OUTPATIENT
Start: 2022-07-07 | End: 2022-07-07 | Stop reason: HOSPADM

## 2022-07-07 RX ORDER — DIPHENHYDRAMINE HYDROCHLORIDE 50 MG/ML
50 INJECTION INTRAMUSCULAR; INTRAVENOUS
Status: COMPLETED | OUTPATIENT
Start: 2022-07-07 | End: 2022-07-07

## 2022-07-07 RX ORDER — DIPHENHYDRAMINE HYDROCHLORIDE 50 MG/ML
50 INJECTION INTRAMUSCULAR; INTRAVENOUS
Status: CANCELLED
Start: 2022-08-04

## 2022-07-07 RX ORDER — SODIUM CHLORIDE 0.9 % (FLUSH) 0.9 %
10 SYRINGE (ML) INJECTION
Status: CANCELLED | OUTPATIENT
Start: 2022-08-04

## 2022-07-07 RX ORDER — HEPARIN 100 UNIT/ML
500 SYRINGE INTRAVENOUS
Status: CANCELLED | OUTPATIENT
Start: 2022-08-04

## 2022-07-07 RX ORDER — FAMOTIDINE 10 MG/ML
20 INJECTION INTRAVENOUS
Status: COMPLETED | OUTPATIENT
Start: 2022-07-07 | End: 2022-07-07

## 2022-07-07 RX ORDER — FAMOTIDINE 10 MG/ML
20 INJECTION INTRAVENOUS
Status: CANCELLED | OUTPATIENT
Start: 2022-08-04

## 2022-07-07 RX ADMIN — ACETAMINOPHEN 650 MG: 325 TABLET ORAL at 08:07

## 2022-07-07 RX ADMIN — FAMOTIDINE 20 MG: 10 INJECTION INTRAVENOUS at 09:07

## 2022-07-07 RX ADMIN — HUMAN IMMUNOGLOBULIN G 85 G: 40 LIQUID INTRAVENOUS at 09:07

## 2022-07-07 RX ADMIN — DIPHENHYDRAMINE HYDROCHLORIDE 50 MG: 50 INJECTION, SOLUTION INTRAMUSCULAR; INTRAVENOUS at 09:07

## 2022-07-07 RX ADMIN — SODIUM CHLORIDE: 9 INJECTION, SOLUTION INTRAVENOUS at 09:07

## 2022-07-07 NOTE — PROGRESS NOTES
Arrived for Privigen infusion. Tylenol 650 mg po, Pepcid 20 mg IVP, and Benadryl 50 IVP administered 30 minutes after pre-medications. Privigen initiated at the following rates with titration every 30 minutes; 26ml/hr-52ml/hr-104ml/hr-207ml/hr, NS  250cc bag @ 25ml/hr concurrently with Privigen. Tolerated without any difficulty.Left unit in NAD.

## 2022-07-18 ENCOUNTER — TELEPHONE (OUTPATIENT)
Dept: PRIMARY CARE CLINIC | Facility: CLINIC | Age: 62
End: 2022-07-18
Payer: MEDICARE

## 2022-07-18 NOTE — TELEPHONE ENCOUNTER
Called pt to confirm appt with Dr. Weeks no answer LVM to call [phone] if pt have any questions or concerns

## 2022-07-19 ENCOUNTER — OFFICE VISIT (OUTPATIENT)
Dept: PRIMARY CARE CLINIC | Facility: CLINIC | Age: 62
End: 2022-07-19
Payer: MEDICARE

## 2022-07-19 VITALS
BODY MASS INDEX: 31.19 KG/M2 | DIASTOLIC BLOOD PRESSURE: 82 MMHG | OXYGEN SATURATION: 99 % | HEART RATE: 94 BPM | SYSTOLIC BLOOD PRESSURE: 142 MMHG | HEIGHT: 65 IN | WEIGHT: 187.19 LBS

## 2022-07-19 DIAGNOSIS — R22.42 LOWER LEG MASS, LEFT: ICD-10-CM

## 2022-07-19 DIAGNOSIS — J31.0 CHRONIC RHINITIS: ICD-10-CM

## 2022-07-19 DIAGNOSIS — E78.5 HYPERLIPIDEMIA, UNSPECIFIED HYPERLIPIDEMIA TYPE: ICD-10-CM

## 2022-07-19 DIAGNOSIS — I10 BENIGN ESSENTIAL HYPERTENSION: Primary | ICD-10-CM

## 2022-07-19 PROCEDURE — 99214 OFFICE O/P EST MOD 30 MIN: CPT | Mod: S$GLB,,, | Performed by: INTERNAL MEDICINE

## 2022-07-19 PROCEDURE — 1159F MED LIST DOCD IN RCRD: CPT | Mod: CPTII,S$GLB,, | Performed by: INTERNAL MEDICINE

## 2022-07-19 PROCEDURE — 3079F PR MOST RECENT DIASTOLIC BLOOD PRESSURE 80-89 MM HG: ICD-10-PCS | Mod: CPTII,S$GLB,, | Performed by: INTERNAL MEDICINE

## 2022-07-19 PROCEDURE — 99214 PR OFFICE/OUTPT VISIT, EST, LEVL IV, 30-39 MIN: ICD-10-PCS | Mod: S$GLB,,, | Performed by: INTERNAL MEDICINE

## 2022-07-19 PROCEDURE — 1160F RVW MEDS BY RX/DR IN RCRD: CPT | Mod: CPTII,S$GLB,, | Performed by: INTERNAL MEDICINE

## 2022-07-19 PROCEDURE — 99999 PR PBB SHADOW E&M-EST. PATIENT-LVL IV: ICD-10-PCS | Mod: PBBFAC,,, | Performed by: INTERNAL MEDICINE

## 2022-07-19 PROCEDURE — 1160F PR REVIEW ALL MEDS BY PRESCRIBER/CLIN PHARMACIST DOCUMENTED: ICD-10-PCS | Mod: CPTII,S$GLB,, | Performed by: INTERNAL MEDICINE

## 2022-07-19 PROCEDURE — 3079F DIAST BP 80-89 MM HG: CPT | Mod: CPTII,S$GLB,, | Performed by: INTERNAL MEDICINE

## 2022-07-19 PROCEDURE — 3008F BODY MASS INDEX DOCD: CPT | Mod: CPTII,S$GLB,, | Performed by: INTERNAL MEDICINE

## 2022-07-19 PROCEDURE — 3077F PR MOST RECENT SYSTOLIC BLOOD PRESSURE >= 140 MM HG: ICD-10-PCS | Mod: CPTII,S$GLB,, | Performed by: INTERNAL MEDICINE

## 2022-07-19 PROCEDURE — 3077F SYST BP >= 140 MM HG: CPT | Mod: CPTII,S$GLB,, | Performed by: INTERNAL MEDICINE

## 2022-07-19 PROCEDURE — 3008F PR BODY MASS INDEX (BMI) DOCUMENTED: ICD-10-PCS | Mod: CPTII,S$GLB,, | Performed by: INTERNAL MEDICINE

## 2022-07-19 PROCEDURE — 99999 PR PBB SHADOW E&M-EST. PATIENT-LVL IV: CPT | Mod: PBBFAC,,, | Performed by: INTERNAL MEDICINE

## 2022-07-19 PROCEDURE — 1159F PR MEDICATION LIST DOCUMENTED IN MEDICAL RECORD: ICD-10-PCS | Mod: CPTII,S$GLB,, | Performed by: INTERNAL MEDICINE

## 2022-07-19 RX ORDER — AZELASTINE 1 MG/ML
1 SPRAY, METERED NASAL 2 TIMES DAILY
Qty: 30 ML | Refills: 2 | Status: SHIPPED | OUTPATIENT
Start: 2022-07-19 | End: 2023-08-27

## 2022-07-19 RX ORDER — CHLORTHALIDONE 25 MG/1
25 TABLET ORAL DAILY
Qty: 90 TABLET | Refills: 3 | Status: SHIPPED | OUTPATIENT
Start: 2022-07-19 | End: 2023-08-27

## 2022-07-19 NOTE — PROGRESS NOTES
"Subjective:       Patient ID: Ermelinda Verde is a 61 y.o. female.    Chief Complaint: HTN follow up    HPI   HTN - at last visit, inc nifedipine xl 60mg daily (also on it due potentially helping w/ fingertip lesions - since Dec). Reports increased leg swelling. Some weight gain overall.  No leg pains. Legs feel tight.   Checks BP daily and 130s/80s.  No longer w/ fingertip lesions. Reports since being on nifedipine, just doesn't feel right. No SOB/HUFFMAN/CP/palpitations.   No leg pains/back pain.     Chronic rhinitis - controlled on astelin prn and saline prn.   Nasal congestion/postnasal drip. Dry mouth.     Hyperlipidemia - started on crestor. No muscle pains.    Review of Systems  Comprehensive review of systems otherwise negative. See history/subjective section for more details.    Objective:      Physical Exam    BP (!) 142/82 (BP Location: Left arm, Patient Position: Sitting, BP Method: Large (Manual))   Pulse 94   Ht 5' 5" (1.651 m)   Wt 84.9 kg (187 lb 2.7 oz)   LMP  (LMP Unknown)   SpO2 99%   BMI 31.15 kg/m²     GEN - A+OX4, NAD   HEENT - PERRL, EOMI, OP clear. MMM. TM normal.   CV - RRR, no m/r   Chest - CTAB, no wheezing or rhonchi  Abd - S/NT/ND/+BS.   Ext - 2+BDP and radial pulses. No LE edema.   MSK - no spinal tenderness to palpation. Normal gait. LLE medial calf w/ hard well defined mobile, nontender mass  Skin - No rash. Taut skin.     Previous labs reviewed.     Assessment/Plan     Diagnoses and all orders for this visit:    Benign essential hypertension - stop procardia due to leg swelling. Start chlorthalidone if fingertip lesions return, can add back on low dose nifedipine.     Chronic rhinitis  -     azelastine (ASTELIN) 137 mcg (0.1 %) nasal spray; 1 spray (137 mcg total) by Nasal route 2 (two) times daily.    Hyperlipidemia, unspecified hyperlipidemia type - cont crestor. Repeat FLP.   -     Comprehensive Metabolic Panel; Future    Lower leg mass, left  -     US Soft Tissue, Lower " Extremity, Left; Future    Other orders  -     chlorthalidone (HYGROTEN) 25 MG Tab; Take 1 tablet (25 mg total) by mouth once daily.    ACP documents given.   Pt is due for f/u w/ Dr. FUNEZ in August but was not able to make appt through portal till end of the yr. Will send message to staff.     Follow up in about 4 weeks (around 8/16/2022).      Reta Weeks MD  Department of Internal Medicine - [facility]  9:35 AM

## 2022-07-20 ENCOUNTER — TELEPHONE (OUTPATIENT)
Dept: PRIMARY CARE CLINIC | Facility: CLINIC | Age: 62
End: 2022-07-20
Payer: MEDICARE

## 2022-07-20 DIAGNOSIS — R22.42 LEG MASS, LEFT: Primary | ICD-10-CM

## 2022-07-20 NOTE — TELEPHONE ENCOUNTER
Pt advised of labs, elevated AST. Let her know we will monitor this.  Advised of leg u/s and MD wanting CT scan. She will schedule this when she is able to go.  She will let us know if she needs help scheduling.

## 2022-07-20 NOTE — TELEPHONE ENCOUNTER
Please call and notify pt:    Electrolytes and kidney functions are normal. AST, one of the liver enzymes, is very mildly elevated but this has happened before and returned to normal. We'll continue to monitor it.    Ultrasound of the lower leg mass shows a about 1cm solid mass. I recommend getting a CT w/ contrast to look at it. There's a [place] shortage of contrast so she likely can't be scheduled till at least the end of August. Since it's been there for a long time and hasn't grown in size (per her), it's ok to wait till then if she's ok w/ it.

## 2022-08-04 ENCOUNTER — INFUSION (OUTPATIENT)
Dept: INFECTIOUS DISEASES | Facility: HOSPITAL | Age: 62
End: 2022-08-04
Attending: INTERNAL MEDICINE
Payer: MEDICARE

## 2022-08-04 VITALS
HEIGHT: 65 IN | BODY MASS INDEX: 30.97 KG/M2 | HEART RATE: 98 BPM | DIASTOLIC BLOOD PRESSURE: 76 MMHG | TEMPERATURE: 98 F | WEIGHT: 185.88 LBS | SYSTOLIC BLOOD PRESSURE: 159 MMHG

## 2022-08-04 DIAGNOSIS — R13.19 ESOPHAGEAL DYSPHAGIA: ICD-10-CM

## 2022-08-04 DIAGNOSIS — M33.13 DERMATOMYOSITIS: Primary | ICD-10-CM

## 2022-08-04 PROCEDURE — 96375 TX/PRO/DX INJ NEW DRUG ADDON: CPT

## 2022-08-04 PROCEDURE — 96365 THER/PROPH/DIAG IV INF INIT: CPT

## 2022-08-04 PROCEDURE — 63600175 PHARM REV CODE 636 W HCPCS: Performed by: INTERNAL MEDICINE

## 2022-08-04 PROCEDURE — 96366 THER/PROPH/DIAG IV INF ADDON: CPT

## 2022-08-04 PROCEDURE — 25000003 PHARM REV CODE 250: Performed by: INTERNAL MEDICINE

## 2022-08-04 RX ORDER — ACETAMINOPHEN 325 MG/1
650 TABLET ORAL
Status: CANCELLED | OUTPATIENT
Start: 2022-09-01

## 2022-08-04 RX ORDER — HEPARIN 100 UNIT/ML
500 SYRINGE INTRAVENOUS
Status: DISCONTINUED | OUTPATIENT
Start: 2022-08-04 | End: 2022-08-04 | Stop reason: HOSPADM

## 2022-08-04 RX ORDER — FAMOTIDINE 10 MG/ML
20 INJECTION INTRAVENOUS
Status: CANCELLED | OUTPATIENT
Start: 2022-09-01

## 2022-08-04 RX ORDER — SODIUM CHLORIDE 0.9 % (FLUSH) 0.9 %
10 SYRINGE (ML) INJECTION
Status: DISCONTINUED | OUTPATIENT
Start: 2022-08-04 | End: 2022-08-04 | Stop reason: HOSPADM

## 2022-08-04 RX ORDER — DIPHENHYDRAMINE HYDROCHLORIDE 50 MG/ML
50 INJECTION INTRAMUSCULAR; INTRAVENOUS
Status: COMPLETED | OUTPATIENT
Start: 2022-08-04 | End: 2022-08-04

## 2022-08-04 RX ORDER — DIPHENHYDRAMINE HYDROCHLORIDE 50 MG/ML
50 INJECTION INTRAMUSCULAR; INTRAVENOUS
Status: CANCELLED
Start: 2022-09-01

## 2022-08-04 RX ORDER — FAMOTIDINE 10 MG/ML
20 INJECTION INTRAVENOUS
Status: COMPLETED | OUTPATIENT
Start: 2022-08-04 | End: 2022-08-04

## 2022-08-04 RX ORDER — ACETAMINOPHEN 325 MG/1
650 TABLET ORAL
Status: COMPLETED | OUTPATIENT
Start: 2022-08-04 | End: 2022-08-04

## 2022-08-04 RX ORDER — HEPARIN 100 UNIT/ML
500 SYRINGE INTRAVENOUS
Status: CANCELLED | OUTPATIENT
Start: 2022-09-01

## 2022-08-04 RX ORDER — SODIUM CHLORIDE 0.9 % (FLUSH) 0.9 %
10 SYRINGE (ML) INJECTION
Status: CANCELLED | OUTPATIENT
Start: 2022-09-01

## 2022-08-04 RX ADMIN — FAMOTIDINE 20 MG: 10 INJECTION INTRAVENOUS at 08:08

## 2022-08-04 RX ADMIN — ACETAMINOPHEN 650 MG: 325 TABLET ORAL at 08:08

## 2022-08-04 RX ADMIN — DIPHENHYDRAMINE HYDROCHLORIDE 50 MG: 50 INJECTION, SOLUTION INTRAMUSCULAR; INTRAVENOUS at 08:08

## 2022-08-04 RX ADMIN — HUMAN IMMUNOGLOBULIN G 85 G: 5 LIQUID INTRAVENOUS at 08:08

## 2022-08-04 RX ADMIN — SODIUM CHLORIDE: 0.9 INJECTION, SOLUTION INTRAVENOUS at 08:08

## 2022-08-04 NOTE — PROGRESS NOTES
Arrived for Privigen infusion. Tylenol 650 mg po, Pepcid 20 mg IVP, and Benadryl 50 IVP administered 30 minutes after pre-medications. Privigen initiated at the following rates with titration every 30 minutes; 25ml/hr-50ml/hr-101ml/hr-202ml/hr, NS  250cc bag @ 25ml/hr concurrently with Privigen. Tolerated without any difficulty.Left unit in NAD.

## 2022-08-22 ENCOUNTER — PATIENT MESSAGE (OUTPATIENT)
Dept: RHEUMATOLOGY | Facility: CLINIC | Age: 62
End: 2022-08-22
Payer: MEDICARE

## 2022-08-27 ENCOUNTER — PATIENT MESSAGE (OUTPATIENT)
Dept: PRIMARY CARE CLINIC | Facility: CLINIC | Age: 62
End: 2022-08-27
Payer: MEDICARE

## 2022-08-29 ENCOUNTER — PATIENT MESSAGE (OUTPATIENT)
Dept: PRIMARY CARE CLINIC | Facility: CLINIC | Age: 62
End: 2022-08-29
Payer: MEDICARE

## 2022-08-31 ENCOUNTER — INFUSION (OUTPATIENT)
Dept: INFECTIOUS DISEASES | Facility: HOSPITAL | Age: 62
End: 2022-08-31
Attending: INTERNAL MEDICINE
Payer: MEDICARE

## 2022-08-31 VITALS
SYSTOLIC BLOOD PRESSURE: 152 MMHG | HEART RATE: 88 BPM | TEMPERATURE: 98 F | WEIGHT: 185.06 LBS | DIASTOLIC BLOOD PRESSURE: 84 MMHG | BODY MASS INDEX: 30.8 KG/M2

## 2022-08-31 DIAGNOSIS — M33.13 DERMATOMYOSITIS: Primary | ICD-10-CM

## 2022-08-31 DIAGNOSIS — R13.19 ESOPHAGEAL DYSPHAGIA: ICD-10-CM

## 2022-08-31 PROCEDURE — 96375 TX/PRO/DX INJ NEW DRUG ADDON: CPT

## 2022-08-31 PROCEDURE — 63600175 PHARM REV CODE 636 W HCPCS: Mod: JG | Performed by: INTERNAL MEDICINE

## 2022-08-31 PROCEDURE — 96366 THER/PROPH/DIAG IV INF ADDON: CPT

## 2022-08-31 PROCEDURE — 63600175 PHARM REV CODE 636 W HCPCS: Performed by: INTERNAL MEDICINE

## 2022-08-31 PROCEDURE — 25000003 PHARM REV CODE 250: Performed by: INTERNAL MEDICINE

## 2022-08-31 PROCEDURE — 96365 THER/PROPH/DIAG IV INF INIT: CPT

## 2022-08-31 RX ORDER — FAMOTIDINE 10 MG/ML
20 INJECTION INTRAVENOUS
Status: COMPLETED | OUTPATIENT
Start: 2022-08-31 | End: 2022-08-31

## 2022-08-31 RX ORDER — DIPHENHYDRAMINE HYDROCHLORIDE 50 MG/ML
50 INJECTION INTRAMUSCULAR; INTRAVENOUS
Status: DISCONTINUED | OUTPATIENT
Start: 2022-08-31 | End: 2022-08-31 | Stop reason: HOSPADM

## 2022-08-31 RX ORDER — ACETAMINOPHEN 325 MG/1
650 TABLET ORAL
Status: COMPLETED | OUTPATIENT
Start: 2022-08-31 | End: 2022-08-31

## 2022-08-31 RX ORDER — HEPARIN 100 UNIT/ML
500 SYRINGE INTRAVENOUS
Status: DISCONTINUED | OUTPATIENT
Start: 2022-08-31 | End: 2022-08-31 | Stop reason: HOSPADM

## 2022-08-31 RX ORDER — SODIUM CHLORIDE 0.9 % (FLUSH) 0.9 %
10 SYRINGE (ML) INJECTION
Status: DISCONTINUED | OUTPATIENT
Start: 2022-08-31 | End: 2022-08-31 | Stop reason: HOSPADM

## 2022-08-31 RX ORDER — ACETAMINOPHEN 325 MG/1
650 TABLET ORAL
Status: CANCELLED | OUTPATIENT
Start: 2022-09-28

## 2022-08-31 RX ORDER — SODIUM CHLORIDE 0.9 % (FLUSH) 0.9 %
10 SYRINGE (ML) INJECTION
Status: CANCELLED | OUTPATIENT
Start: 2022-09-28

## 2022-08-31 RX ORDER — DIPHENHYDRAMINE HYDROCHLORIDE 50 MG/ML
50 INJECTION INTRAMUSCULAR; INTRAVENOUS
Status: COMPLETED | OUTPATIENT
Start: 2022-08-31 | End: 2022-08-31

## 2022-08-31 RX ORDER — DIPHENHYDRAMINE HYDROCHLORIDE 50 MG/ML
50 INJECTION INTRAMUSCULAR; INTRAVENOUS
Status: CANCELLED
Start: 2022-09-28

## 2022-08-31 RX ORDER — FAMOTIDINE 10 MG/ML
20 INJECTION INTRAVENOUS
Status: CANCELLED | OUTPATIENT
Start: 2022-09-28

## 2022-08-31 RX ORDER — HEPARIN 100 UNIT/ML
500 SYRINGE INTRAVENOUS
Status: CANCELLED | OUTPATIENT
Start: 2022-09-28

## 2022-08-31 RX ADMIN — DIPHENHYDRAMINE HYDROCHLORIDE 50 MG: 50 INJECTION INTRAMUSCULAR; INTRAVENOUS at 08:08

## 2022-08-31 RX ADMIN — HUMAN IMMUNOGLOBULIN G 85 G: 5 LIQUID INTRAVENOUS at 08:08

## 2022-08-31 RX ADMIN — ACETAMINOPHEN 325MG 650 MG: 325 TABLET ORAL at 08:08

## 2022-08-31 RX ADMIN — FAMOTIDINE 20 MG: 10 INJECTION, SOLUTION INTRAVENOUS at 08:08

## 2022-08-31 RX ADMIN — SODIUM CHLORIDE: 0.9 INJECTION, SOLUTION INTRAVENOUS at 08:08

## 2022-08-31 NOTE — PROGRESS NOTES
Arrived for Privigen infusion. Tylenol 650 mg po, Pepcid 20 mg IVP, and Benadryl 50 IVP administered 30 minutes after pre-medications. Privigen initiated at the following rates with titration every 30 minutes; 25ml/hr-50ml/hr-100 ml/hr-201ml/hr, NS  250cc bag @ 25ml/hr concurrently with Privigen. Tolerated without any difficulty.Left unit in NAD.

## 2022-09-13 ENCOUNTER — LAB VISIT (OUTPATIENT)
Dept: LAB | Facility: HOSPITAL | Age: 62
End: 2022-09-13
Attending: INTERNAL MEDICINE
Payer: MEDICARE

## 2022-09-13 ENCOUNTER — OFFICE VISIT (OUTPATIENT)
Dept: RHEUMATOLOGY | Facility: CLINIC | Age: 62
End: 2022-09-13
Payer: MEDICARE

## 2022-09-13 ENCOUNTER — OFFICE VISIT (OUTPATIENT)
Dept: PRIMARY CARE CLINIC | Facility: CLINIC | Age: 62
End: 2022-09-13
Payer: MEDICARE

## 2022-09-13 VITALS
BODY MASS INDEX: 30.45 KG/M2 | DIASTOLIC BLOOD PRESSURE: 84 MMHG | OXYGEN SATURATION: 100 % | HEIGHT: 65 IN | WEIGHT: 182.75 LBS | HEART RATE: 86 BPM | SYSTOLIC BLOOD PRESSURE: 156 MMHG

## 2022-09-13 VITALS
TEMPERATURE: 98 F | HEIGHT: 65 IN | SYSTOLIC BLOOD PRESSURE: 163 MMHG | BODY MASS INDEX: 30.64 KG/M2 | WEIGHT: 183.88 LBS | DIASTOLIC BLOOD PRESSURE: 89 MMHG | HEART RATE: 84 BPM

## 2022-09-13 DIAGNOSIS — I10 BENIGN ESSENTIAL HYPERTENSION: ICD-10-CM

## 2022-09-13 DIAGNOSIS — M33.13 DERMATOMYOSITIS: Primary | ICD-10-CM

## 2022-09-13 DIAGNOSIS — M81.0 OSTEOPOROSIS, UNSPECIFIED OSTEOPOROSIS TYPE, UNSPECIFIED PATHOLOGICAL FRACTURE PRESENCE: ICD-10-CM

## 2022-09-13 DIAGNOSIS — M33.13 DERMATOMYOSITIS: ICD-10-CM

## 2022-09-13 DIAGNOSIS — R22.42 LEG MASS, LEFT: ICD-10-CM

## 2022-09-13 DIAGNOSIS — M85.80 OSTEOPENIA, UNSPECIFIED LOCATION: ICD-10-CM

## 2022-09-13 DIAGNOSIS — I10 BENIGN ESSENTIAL HYPERTENSION: Primary | ICD-10-CM

## 2022-09-13 LAB
ANION GAP SERPL CALC-SCNC: 11 MMOL/L (ref 8–16)
BUN SERPL-MCNC: 14 MG/DL (ref 8–23)
CALCIUM SERPL-MCNC: 9.7 MG/DL (ref 8.7–10.5)
CHLORIDE SERPL-SCNC: 98 MMOL/L (ref 95–110)
CO2 SERPL-SCNC: 27 MMOL/L (ref 23–29)
CREAT SERPL-MCNC: 0.8 MG/DL (ref 0.5–1.4)
EST. GFR  (NO RACE VARIABLE): >60 ML/MIN/1.73 M^2
GLUCOSE SERPL-MCNC: 92 MG/DL (ref 70–110)
POTASSIUM SERPL-SCNC: 3.7 MMOL/L (ref 3.5–5.1)
SODIUM SERPL-SCNC: 136 MMOL/L (ref 136–145)

## 2022-09-13 PROCEDURE — 3077F PR MOST RECENT SYSTOLIC BLOOD PRESSURE >= 140 MM HG: ICD-10-PCS | Mod: CPTII,S$GLB,, | Performed by: INTERNAL MEDICINE

## 2022-09-13 PROCEDURE — 1159F PR MEDICATION LIST DOCUMENTED IN MEDICAL RECORD: ICD-10-PCS | Mod: CPTII,S$GLB,, | Performed by: INTERNAL MEDICINE

## 2022-09-13 PROCEDURE — 99214 OFFICE O/P EST MOD 30 MIN: CPT | Mod: S$GLB,,, | Performed by: INTERNAL MEDICINE

## 2022-09-13 PROCEDURE — 99214 PR OFFICE/OUTPT VISIT, EST, LEVL IV, 30-39 MIN: ICD-10-PCS | Mod: S$GLB,,, | Performed by: INTERNAL MEDICINE

## 2022-09-13 PROCEDURE — 3008F PR BODY MASS INDEX (BMI) DOCUMENTED: ICD-10-PCS | Mod: CPTII,S$GLB,, | Performed by: INTERNAL MEDICINE

## 2022-09-13 PROCEDURE — 3077F SYST BP >= 140 MM HG: CPT | Mod: CPTII,S$GLB,, | Performed by: INTERNAL MEDICINE

## 2022-09-13 PROCEDURE — 3008F BODY MASS INDEX DOCD: CPT | Mod: CPTII,S$GLB,, | Performed by: INTERNAL MEDICINE

## 2022-09-13 PROCEDURE — 36415 COLL VENOUS BLD VENIPUNCTURE: CPT | Mod: PN | Performed by: INTERNAL MEDICINE

## 2022-09-13 PROCEDURE — 99999 PR PBB SHADOW E&M-EST. PATIENT-LVL III: CPT | Mod: PBBFAC,,, | Performed by: INTERNAL MEDICINE

## 2022-09-13 PROCEDURE — 1160F RVW MEDS BY RX/DR IN RCRD: CPT | Mod: CPTII,S$GLB,, | Performed by: INTERNAL MEDICINE

## 2022-09-13 PROCEDURE — 1159F MED LIST DOCD IN RCRD: CPT | Mod: CPTII,S$GLB,, | Performed by: INTERNAL MEDICINE

## 2022-09-13 PROCEDURE — 3079F DIAST BP 80-89 MM HG: CPT | Mod: CPTII,S$GLB,, | Performed by: INTERNAL MEDICINE

## 2022-09-13 PROCEDURE — 80048 BASIC METABOLIC PNL TOTAL CA: CPT | Performed by: INTERNAL MEDICINE

## 2022-09-13 PROCEDURE — 99999 PR PBB SHADOW E&M-EST. PATIENT-LVL III: ICD-10-PCS | Mod: PBBFAC,,, | Performed by: INTERNAL MEDICINE

## 2022-09-13 PROCEDURE — 3079F PR MOST RECENT DIASTOLIC BLOOD PRESSURE 80-89 MM HG: ICD-10-PCS | Mod: CPTII,S$GLB,, | Performed by: INTERNAL MEDICINE

## 2022-09-13 PROCEDURE — 1160F PR REVIEW ALL MEDS BY PRESCRIBER/CLIN PHARMACIST DOCUMENTED: ICD-10-PCS | Mod: CPTII,S$GLB,, | Performed by: INTERNAL MEDICINE

## 2022-09-13 RX ORDER — NIFEDIPINE 30 MG/1
30 TABLET, EXTENDED RELEASE ORAL DAILY
Qty: 90 TABLET | Refills: 3 | Status: SHIPPED | OUTPATIENT
Start: 2022-09-13 | End: 2022-12-16

## 2022-09-13 ASSESSMENT — ROUTINE ASSESSMENT OF PATIENT INDEX DATA (RAPID3)
FATIGUE SCORE: 0.5
AM STIFFNESS SCORE: 1, YES
PSYCHOLOGICAL DISTRESS SCORE: 3.3
TOTAL RAPID3 SCORE: 0.72
WHEN YOU AWAKENED IN THE MORNING OVER THE LAST WEEK, PLEASE INDICATE THE AMOUNT OF TIME IT TAKES UNTIL YOU ARE AS LIMBER AS YOU WILL BE FOR THE DAY: 1 HOUR
PATIENT GLOBAL ASSESSMENT SCORE: 1
PAIN SCORE: 0.5
MDHAQ FUNCTION SCORE: 0.2

## 2022-09-13 NOTE — PROGRESS NOTES
RHEUMATOLOGY CLINIC FOLLOW UP VISIT  Chief complaints:-  To follow up for Myositis follow up     HPI:-  Ermelinda Javed a 61 y.o. pleasant female  with history of L sided infiltrating ductal carcinoma of the L breast (dx on Jan 2017), HTN, depression, gastritis, and recently diagnosed myositis (uncertain if it's autoimmune vs medication induced), present today with concern about myositis flare     Patient was initially send from hem/onc clinic for evaluation of possible inflammatory myositis.  Patient was hospitalized from 1/22/19 till 2/13/19 for myositis.      L breast cancer s/p completion of 4 cycles of demi-adjuvant taxotere and cytoxan (completed on 5/9/17) and mastectomy (6/26/17) and then 4 cycles of adjuvant Adriamycin (completed 9/12/17). In 10/2017 - patient developed L supraclavicular lymphadenopathy which FNA confirmed as reoccurrence of previously treated breast cancer.      Patient started on Atelizumab/Abraxane on 11/7/18. Per chart review, patient noticed rashes on the dorsum of her hands on 11/11/18. Seen in urgent care and given topical steroid cream. Then received two more infusions on Atelizumab/Abraxane on 11/21/18 and 12/5/18. Started to notice puffiness around the eyes on 12/11/18. Received another Abraxane infusion on 12/12/18 but this time with hydrocortisone 50 mg which helped reduce the swelling around the eyes. Developed swelling of the face again on 12/15/18. Next infusion of Abraxane done on 12/19/18 with Solucortef and Atelizumab held. Abraxane given again on 1/3/19 with no IV steroid. Patient developed swelling of the face on 1/4/19 and went to urgent care and given short course of prednisone 20 mg BID. On 1/15/19, patient seen in hem/onc clinic with c/o of pressure and tightness around neck. CT scan of chest and neck did not reveal any vascular compression. Started on prednisone 60 mg with taper. On 1/22/18 patient with c/o  proximal muscle weakness. CPK found to be elevated around 4k. Patient admitted and given solumedrol 80 mg IV on 1/22/18. Last infusion of Atelizumab on 12/19/18. Last infusion of Abraxane on 1/3/19. Given Solumedrol 1g x 1 on 1/23/18. Seen by Dermatology on 1/24/18 and biopsy done of skin rash. Given distribution of rashes, there was concern for dermatomyositis. Patient started on Solumedrol 125 mg IV BID at this time.      During her hospitalization patient was started on high dose steroid Solumedrol 80mg IV x 1, 1g x 1, and then 125mg BID until tapered down to medrol 48mg.  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  Patient was started on IVIG (400mg/kg/daily x 5 day) 1/29/19-2/2.   Patient started on MTX 20mg SQ (Feb 5 2019) with folic acid. MTX SQ was transitioned to PO because concern about rehab administration.  Patient failed swallow eval and PEG tube was placed.        Denies any family history of autoimmune diseases.     No smoking, EtOH, recreational drug usage.     Denies any photosensitivity, joint swelling, unintentional weigh loss, abdominal pain, night sweats, CP, SOB.  +oral thrush.      Completed rehab at [facility].  Completed IVIG (2/28 and 3/1).  Had mild HA after infusion.  Doing well.  A lot stronger - able to do household chores since discharge.  Not back at baseline yet ~80%.  Mild weakness with increased activities.  Able to ambulate without assistance (but is still a fall precaution).  Tolerating diet via PEG tube.  Completed rehab.      MTX discontinued 2/18 with concern of toxicity (decrease blood counts and transaminatis).  Folic acid increased to 4mg daily.  Still on medrol 32mg daily with atorvaquone for PCP prophylaxis.  Bactrim d/c because of interaction with leucovorin.  Leucovorin 5mg daily started - Leucovorin discontinued.  Continues to be on folic acid 4mg daily     Radiation completed (28 days) completed May 8.       EGD/colonoscopy done on July 29 - normal.  PEG  tube removed     Last PET scan (June 20) showed negative for metastasis.  At this time, will monitor per oncology and repeat PET scan in Sept.  No treatment needed at this time.      Rash was biopsied and evaluated by dermatology.   Biopsy report conclusive of dermatomyositis.  Was given topical steroid which provided minimal alleviation of symptoms.  +paco      6/2020    Completed Rituxan on 4/27 (1g x 2).  Had a flare after infusion requiring increase of steroid.  Since then patient had been doing well - improving of myalgia and rash.  Still having mild edema and generalized weakness (especially in the afternoon/evening).       Patient was started on HCQ - compliant on all home medications.  Continues to work out daily.  Finds most difficulty with getting in and out of the car.   Denies any dysphagia, alopecia, arthralgia, abdominal pain, CP, SOB, N/V, chills, or urinary symptoms.      7/2020    Rash all over her body  Red rash on the left leg  Face once again has erythema and heliotrope rash  Prednisone down to 10 mg and looks like she has a flare again  On plaquenil 200 mg bid  Wears sunscreen  Avoids the direct sun    Most recent labs     Ck nml  Aldolase nml  ESR 53  CRP nml  Mild anemia  AST 67      9/2020    She started xeljanz 5 mg bid mid august  She feels well  Swelling is better  Her weakness and fatigue is all gone  Face still looks red   Chest is all better and upper arm is great  Itch and rash seems better   Rash on the legs is gone     CK,aldolase nml  CRP nml   now   AST 61  GFR nml  ALT nml    plts 145 but stable  H/h 11.6/35.3    But the white count has dropped to 2.70    It has been low on and off since 2019 so this is not new     She has a sinus issue going on   She has greenish d/c  She has a tinge of blood       10/12/2020  We cut the xeljanz dose to 5 mg daily  No more swelling  Face looks clear,not much redness  The eyelid is not puffy and not intensely erythematous   Along the  nasal folds there is some redness but again not intense  On the MCPs and Dorsal hands she has some erythema which is more pinkish  Not itchy  Sometimes these pink areas can turn pigmented  Now complaint to sunscreen      White count has improved from 2.72 to 2.80  H/h 11.3/34.7  plts nml  ANC has improved from 0.6 to 1.4  Lymphocyte count 0.9    2/2021    She has fatigue if she has to do a lot of things  Facial rash is stable  On the hands : redness is stable  Sometimes left ankle and left knee is swollen,painful/tight   Not muscular weakness but she feels skinny  BMI is 29.39 though    White count 4.30  H/H 11.6/35.4  plts 223  Lymphocytes nml    AST went upto 52  Rest of LFTs ok  GFR nml  ESR > 120  CRP nml  CK,aldolase nml    2/2021 we made her xeljanz 7.5 mg daily  Gets IVIG     4/2021  Dermatology said    Patient appears to have 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 No need for addition of systemic steroids with controlled myopathy and otherwise absence of systemic disease due to lack of efficacy in controlling cutaneous disease, side effect profile.   Currently with evidence of both nonvasculopathic cutaneous disease (heliotrophe, facial rash, Gottron's papules) as well as a degree vasculopathic disease (nailfold vascular changes, evidence digital pulp ulcers/lesions, associated pain at these locations). Overall patient doing well, states QOL not greatly affected by residual rashes.      Considerations for comorbid rosacea/seb derm at face (micropustules on exam today; some scaling at brows, nasal creases noted).      Recommendations:  - Strict sun protection measures always.   - Optimization of topicals as above: Elidel mixed 50/50 with ketoconazole cream BID PRN rash at face; Plexion wash daily at face; transition to Diprolene cream for rash at hands.   - Offered ILK to rash at hands, patient again declined.   - Consideration for  addition of vasodilatory agent (e.g. nifedipine) for relief in associated pain at fingertip lesions.   - Otherwise c/w rheumatologic management with IVIG, tofacitinib.      Labs 4/2021    H/h 11.3/36.2  nml white count 5.27  plts nml    5/2021    Patient would like to consider steroid/intralesional kenalog injections  She had stopped xeljanz on 4/28 for the covid vaccine   White count was done 2 days after 4/28 which is on 4/30 and the count was 5.27  She got covid vaccine on 3/29   Stopped xeljanz till 4/6  Did the blood work on 4/8 and was on xeljanz at that time  So her white count was 3.80  Prior to that she was 4.30 and 5.19    CMP nml    10/2021    She is doing really well  She has not needed steroid creams or injections  Skin looks really good  She is not puffy,she is not weak    Left foot has a stabbing pain after she walks around for some time  Some aches and pains    8/2021    CRP 15.8  ESR > 130  She had sinus issues at this time  Ck,aldolase nml  AST still 51  ALT nml  GFR nml  H/h 11.6/34.7  nml white count,4.90  nml plts  GGT nml  Vit d nml    1/2022    She is doing really really well  No more red and pink rashes  No more edema  On xeljanz 5 mg daily/was on 7.5 mg daily   Insurance is denying-so appeal     Labs     Ck,aldolase nml  CRP nml  ESR 67  CMP nml  CBC nml      5/2022    She took xeljanz 5 mg daily for 2 to 3 months and then didn't have medication for a month- thankfully has not flared in the skin and muscles  Today's labs are pending   IVIG - is still monthly     9/2022      On xeljanz 5 mg daily  She feels great  IVIG is monthly    Right hand -looks a little flared - this is her driving hand     Aldolase nml  -Stable  CRP nml  ESR Down to 67,it was > 120  CMP- AST upto 50  ALT nml  CBC - H/H 11.5/34.1,Nml plts,white count       Review of Systems   Constitutional:  Negative for chills, diaphoresis, fever, malaise/fatigue and weight loss.   HENT:  Negative for congestion, ear discharge,  ear pain, hearing loss, nosebleeds, sinus pain and tinnitus.    Eyes:  Positive for discharge. Negative for photophobia, pain and redness.   Respiratory:  Negative for cough, hemoptysis, sputum production, shortness of breath, wheezing and stridor.    Cardiovascular:  Negative for chest pain, palpitations, orthopnea, claudication, leg swelling and PND.   Gastrointestinal:  Positive for constipation. Negative for abdominal pain, diarrhea, heartburn, nausea and vomiting.   Genitourinary:  Negative for dysuria, frequency, hematuria and urgency.   Musculoskeletal:  Positive for myalgias. Negative for back pain, joint pain and neck pain.   Skin:  Positive for rash.   Neurological:  Negative for dizziness, tingling, tremors, weakness and headaches.   Endo/Heme/Allergies:  Does not bruise/bleed easily.   Psychiatric/Behavioral:  Negative for depression, hallucinations and suicidal ideas. The patient is not nervous/anxious and does not have insomnia.      Past Medical History:   Diagnosis Date    Anemia 2019    Anxiety 2018    Breast cancer 2017    left    Depression     Dermatomyositis     Diverticulosis     Erosive esophagitis     Gastritis     Hepatic cyst     Hypertension     Immune disorder 2019    Joint pain 2019    Keloid cicatrix 2005    Thyroiditis     Vitamin B12 deficiency 3/8/2018       Past Surgical History:   Procedure Laterality Date    BREAST BIOPSY Left     BREAST RECONSTRUCTION Left 2017     SECTION      COLONOSCOPY  2011    repeat in 10 yrs.    COLONOSCOPY N/A 2019    Procedure: COLONOSCOPY;  Surgeon: Pernell Rosas MD;  Location: [address]);  Service: Endoscopy;  Laterality: N/A;  Patient would like to use her PEG tube for bowel prep and then have her PEG tube removed after EGD and colonoscopy procedures while she is still sedated.    D&C      ESOPHAGOGASTRODUODENOSCOPY N/A 2019    Procedure: EGD (ESOPHAGOGASTRODUODENOSCOPY);  Surgeon: Pernell Rosas MD;  Location: [facility]  ENDO (2ND FLR);  Service: Endoscopy;  Laterality: N/A;    ESOPHAGOGASTRODUODENOSCOPY N/A 7/29/2019    Procedure: EGD (ESOPHAGOGASTRODUODENOSCOPY);  Surgeon: Pernell Rosas MD;  Location: [facility] (4TH FLR);  Service: Endoscopy;  Laterality: N/A;  EGD for follow-up of erosive esophagitis per Dr. Rosas    Patient would like to use her PEG tube for bowel prep and then have her PEG tube removed after EGD and colonoscopy procedures while she is still sedated.    INSERTION OF TUNNELED CENTRAL VENOUS CATHETER (CVC) WITH SUBCUTANEOUS PORT Right 10/31/2018    Procedure: [id number];  Surgeon: Deo Palencia MD;  [facility];  Service: General;  Laterality: Right;    MASTECTOMY Left 06/26/2017    MYOMECTOMY      PORTACATH PLACEMENT      SENTINEL LYMPH NODE BIOPSY  02/2017    left    SKIN BIOPSY  2019    TOTAL REDUCTION MAMMOPLASTY Right 2017    UPPER GASTROINTESTINAL ENDOSCOPY          Social History     Tobacco Use    Smoking status: Never    Smokeless tobacco: Never   Substance Use Topics    Alcohol use: Not Currently     Alcohol/week: 1.0 standard drink     Types: 1 Glasses of wine per week     Comment: rare    Drug use: No       Family History   Problem Relation Age of Onset    Heart disease Father     Hypertension Father     Breast cancer Sister 52    Hypertension Mother     No Known Problems Brother     Thyroid disease Daughter         hyperthyroidism s/p thyroidectomy    No Known Problems Son     No Known Problems Brother     No Known Problems Brother     No Known Problems Sister     No Known Problems Daughter     Colon cancer Neg Hx     Ovarian cancer Neg Hx     Celiac disease Neg Hx     Cirrhosis Neg Hx     Colon polyps Neg Hx     Esophageal cancer Neg Hx     Inflammatory bowel disease Neg Hx     Liver cancer Neg Hx     Liver disease Neg Hx     Rectal cancer Neg Hx     Stomach cancer Neg Hx     Ulcerative colitis Neg Hx     Melanoma Neg Hx        Review of patient's allergies indicates:  No  "Known Allergies    Vitals:    09/13/22 1102   BP: (!) 163/89   Pulse: 84   Temp: 97.9 °F (36.6 °C)   Weight: 83.4 kg (183 lb 13.8 oz)   Height: 5' 5" (1.651 m)   PainSc:   1       Physical Exam  HENT:      Head: Normocephalic and atraumatic.   Eyes:      Pupils: Pupils are equal, round, and reactive to light.   Cardiovascular:      Rate and Rhythm: Normal rate and regular rhythm.      Heart sounds: Normal heart sounds.   Pulmonary:      Effort: Pulmonary effort is normal.      Breath sounds: Normal breath sounds.   Abdominal:      General: Bowel sounds are normal.      Palpations: Abdomen is soft.   Musculoskeletal:         General: Normal range of motion.      Cervical back: Normal range of motion and neck supple.   Skin:     General: Skin is warm and dry.      Findings: No erythema or rash.      Comments: Right hand - rash is back again,left hand not so prominent     Neurological:      Mental Status: She is alert and oriented to person, place, and time.      Gait: Gait is intact.   Psychiatric:         Mood and Affect: Mood and affect normal.         Cognition and Memory: Memory normal.         Judgment: Judgment normal.     Digital pitting +    Labs:  Results for ROMEO PEREZ (MRN [id number]) as of 6/17/2020 10:12   Ref. Range 4/22/2020 11:51 4/24/2020 11:43 5/25/2020 09:34 6/10/2020 11:25 6/10/2020 11:26   WBC Latest Ref Range: 3.90 - 12.70 K/uL 4.30  3.60 (L) 4.60    RBC Latest Ref Range: 4.00 - 5.40 M/uL 4.49  4.52 4.47    Hemoglobin Latest Ref Range: 12.0 - 16.0 g/dL 12.1  12.5 12.4    Hematocrit Latest Ref Range: 37.0 - 48.5 % 38.2  38.7 38.4    MCV Latest Ref Range: 82 - 98 fL 85  86 86    MCH Latest Ref Range: 27.0 - 31.0 pg 27.0  27.7 27.8    MCHC Latest Ref Range: 32.0 - 36.0 g/dL 31.8 (L)  32.4 32.4    RDW Latest Ref Range: 11.5 - 14.5 % 16.0 (H)  17.2 (H) 17.3 (H)    Platelets Latest Ref Range: 150 - 350 K/uL 157  206 164    MPV Latest Ref Range: 7.4 - 10.4 fL 7.3 (L)  7.0 (L) 7.2 (L)    Gran% " Latest Ref Range: 38.0 - 73.0 % 66.0  62.8 66.2    Gran # (ANC) Latest Ref Range: 1.8 - 7.7 K/uL 2.8  2.2 3.0    Lymph% Latest Ref Range: 18.0 - 48.0 % 15.3 (L)  15.5 (L) 14.6 (L)    Lymph # Latest Ref Range: 1.0 - 4.8 K/uL 0.7 (L)  0.6 (L) 0.7 (L)    Mono% Latest Ref Range: 4.0 - 15.0 % 11.2  17.7 (H) 14.2    Mono # Latest Ref Range: 0.3 - 1.0 K/uL 0.5  0.6 0.6    Eosinophil% Latest Ref Range: 0.0 - 8.0 % 6.5  3.1 3.9    Eos # Latest Ref Range: 0.0 - 0.5 K/uL 0.3  0.1 0.2    Basophil% Latest Ref Range: 0.0 - 1.9 % 1.0  0.9 1.1    Baso # Latest Ref Range: 0.00 - 0.20 K/uL 0.00  0.00 0.00    nRBC Latest Ref Range: 0 /100 WBC 0  0 0    Differential Method Unknown Automated  Automated Automated    Sed Rate Latest Ref Range: 0 - 30 mm/Hr 56 (H)  91 (H)  73 (H)   Sodium Latest Ref Range: 136 - 145 mmol/L 135 (L)  139 137    Potassium Latest Ref Range: 3.5 - 5.1 mmol/L 3.8  3.8 3.8    Chloride Latest Ref Range: 95 - 110 mmol/L 98  102 102    CO2 Latest Ref Range: 23 - 29 mmol/L 31 (H)  29 29    BUN, Bld Latest Ref Range: 7 - 17 mg/dL 17  12 13    Creatinine Latest Ref Range: 0.70 - 1.20 mg/dL 0.70  0.60 (L) 0.70    eGFR if non African American Latest Ref Range: >60 mL/min/1.73 m^2 >60  >60 >60    eGFR if  Latest Ref Range: >60 mL/min/1.73 m^2 >60  >60 >60    Glucose Latest Ref Range: 74 - 106 mg/dL 114 (H)  89 93    Calcium Latest Ref Range: 8.4 - 10.2 mg/dL 9.8  9.8 9.7    Alkaline Phosphatase Latest Ref Range: 38 - 145 U/L 65  63 58    PROTEIN TOTAL Latest Ref Range: 6.3 - 8.2 g/dL 9.3 (H)  8.9 (H) 8.4 (H)    Albumin Latest Ref Range: 3.5 - 5.2 g/dL 4.1  4.2 4.1    BILIRUBIN TOTAL Latest Ref Range: 0.2 - 1.3 mg/dL 0.4  0.5 0.4    AST Latest Ref Range: 14 - 36 U/L 85 (H)  64 (H) 62 (H)    ALT Latest Ref Range: 10 - 44 U/L 28  26 22    CRP Latest Ref Range: 0.0 - 10.0 mg/L <5.0  <5.0 <5.0    CPK Latest Ref Range: 30 - 135 U/L 115  79 101    Hemoglobin A1C External Latest Ref Range: 4.0 - 6.0 %     6.0    SARS-CoV2 (COVID-19) Qualitative PCR Latest Ref Range: Not Detected   Not Detected      Aldolase Latest Ref Range: < OR = 8.1 U/L 5.9  5.5  4.7     Medication List with Changes/Refills   Current Medications    AZELASTINE (ASTELIN) 137 MCG (0.1 %) NASAL SPRAY    1 spray (137 mcg total) by Nasal route 2 (two) times daily.    CALCIUM CARBONATE (OS-ESTELA) 600 MG CALCIUM (1,500 MG) TAB    Take 600 mg by mouth 2 (two) times daily with meals.    CHLORTHALIDONE (HYGROTEN) 25 MG TAB    Take 1 tablet (25 mg total) by mouth once daily.    DICLOFENAC SODIUM (VOLTAREN) 1 % GEL    Apply 2 g topically 4 (four) times daily. Apply to areas of left ankle pain    KETOCONAZOLE (NIZORAL) 2 % SHAMPOO    Wash scalp and ears with medicated shampoo at least 2x/week - let sit at least 5 minutes prior to rinsing    LEVOCETIRIZINE (XYZAL) 5 MG TABLET    TAKE 1 TABLET(5 MG) BY MOUTH EVERY EVENING    MAGNESIUM 30 MG TAB    Take by mouth once.    MULTIVITAMIN CAPSULE    Take 1 capsule by mouth once daily.    NIFEDIPINE (PROCARDIA-XL) 30 MG (OSM) 24 HR TABLET    Take 1 tablet (30 mg total) by mouth once daily.    ROSUVASTATIN (CRESTOR) 10 MG TABLET    Take 1 tablet (10 mg total) by mouth once daily.    VITAMIN D (VITAMIN D3) 1000 UNITS TAB    Take 1,000 Units by mouth once daily.   Discontinued Medications    TOFACITINIB (XELJANZ) 5 MG TAB    Alternate taking 1 tablet by mouth every other day with taking 2 tablets by mouth every other day.       Assessment/Plans:-  59 y.o.F with history of L sided infiltrating ductal carcinoma of the L breast (dx on Jan 2017), HTN, depression, gastritis, and recently diagnosed myositis (uncertain if it's autoimmune vs medication induced), present today for follow up because of concern about myositis flare.     Patient started on Atelizumab/Abraxane on 11/7/18.  Patient developed swelling of the face on 1/4/19 and went to urgent care and given short course of prednisone 20 mg BID. On 1/15/19, patient seen in  hem/onc clinic with c/o of pressure and tightness around neck. CT scan of chest and neck did not reveal any vascular compression. Started on prednisone 60 mg with taper. On 1/22/18 patient with c/o proximal muscle weakness. CPK found to be elevated around 4k. Patient admitted and given solumedrol 80 mg IV on 1/22/18. Last infusion of Atelizumab on 12/19/18. Last infusion of Abraxane on 1/3/19. Given Solumedrol 1g x 1 on 1/23/18. Seen by Dermatology on 1/24/18 and biopsy done of skin rash. Given distribution of rashes, there was concern for dermatomyositis. Patient started on Solumedrol 125 mg IV BID at that time.  Skin biopsy resulted consistent with dermatomyositis.     Labs: CPK and Aldolase normalized. +DARCY 1:640 speckled with negative profile.  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 quantTB: indeterminate, T-spot: negative     Spirometry: normal, normal diffusion capacity. FVC: 81%, DLCO: 114%     Patient exhibits signs of dermatomyositis (heliotropic rash and gottron's papules).   Dermatomyositis can be associated with malignancy.  MRI of LUE showed edema of the deltoid and biceps.  Exam with proximal muscle weakness: b/l deltoids 4/5, b/l iliopsoas 4.4/5. 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   Given +TIF1 gamma positivity, most likely dermatomyositis is from underlying malignancy.      Failed 2nd swallow eval on 2/6/19. PEG placed 2/7/2019.     Current medications:  - prednisone 20mg   - IVIG Started Jan 2019 - last dose April 7  - Rituxan 1000mg x 2 (last dose April 27)  - HCQ  200mg BID      Esophageal biopsy - negative for viral infection.  Nonspecific chronic inflammation.     EGD/Colonoscopy (July 2019) - normal. PEG tube removed      Fiboscan done Aug 2019 - showed fatty liver with no scarring/damage.,      Failed Cellcept - worsening leukopenia, elevated LFTs, and other side effects without improvement of symptoms      Recent studies demonstrated increased efficacy in IVIG when spaced out (Q2w instead of Q4w)     Patient is an intermediate metabolizer based on TPMT.  Cannot start imuran.  Labs still neutropenic and thrombocytopenic at this time - uncertain of the cause (radiation induce BM fibrosis vs medication induce?)      Vaccination completed - Prevnar and Shingles (completed Aug 2019) and flu vaccination for this season (Aug 2019)      Dermatomyositis - currently on Rituxan and Prednisone 20mg daily.  Tolerating well and improvement of symptoms.  Plan is taper steroid and continue Rituxan 1000mg x 2 every 6 months.     It appears that patient continued to fail steroid tapering on multiple occasion.  Patient is uptodate on all CA screening - next PET scan is July 2020.  Other consideration for continued myalgia includes neurological condition such as MG.  Will other MG panel and referral to neurology.     If patient continues to failed steroid tapering - consideration for tacrolimus + IVIG, Xeljanz, plasmapharesis/plasma exchange.       My addendum last time    60 year old female with dermatomyositis s/p PD1 inhibitor used for breast cancer  TIF1 gamma +    Low dose steroids didn't help    Rash+ muscle weakness+dysphagia     IVIG and steroids initial treatment    mtx caused LFT derangement,anemia and leukopenia   She didn't respond to IVIG, initially we gave IVIG 2 g/kg every 4 weeks and then 1 g/kg bw ever 2 weeks : poor response   She was on cellcept 500 mg daily and she had cytopenias   Imuran not an option since she is intermediate metaboliser     She had significant  periorbital edema,rash on the neck and thighs    We didn't think she was responding to the treatment     So we gave her rituximab 1000 mg x 2    She did well and then she flared again may 2020    We had to go up on the dose to 30 mg and then tapered it to 20 mg  We started plaquenil    CT chest abdomen and pelvis nml  Colonoscopy nml  PET lymphadenopathy  Echo nml    Myasthenia gravis panel negative     Last time she needed prednisone 10 mg     She was weak and her skin had flared    Refractory dermatomyositis    We resumed IVIG and xeljanz 5 mg bid     She had done really well        9/2020    She started xeljanz 5 mg bid mid august  She feels well  Swelling is better  Her weakness and fatigue is all gone  Face still looks red   Chest is all better and upper arm is great  Itch and rash seems better   Rash on the legs is gone     CK,aldolase nml  CRP nml   now   AST 61  GFR nml  ALT nml    plts 145 but stable  H/h 11.6/35.3    But the white count has dropped to 2.70    It has been low on and off since 2019 so this is not new     Her lymphocyte count is 900 cells/mm3    Lymphopenia    In the controlled clinical trials, confirmed decreases in absolute lymphocyte counts below 500 cells/mm3 occurred in 0.04% of patients for the 5 mg twice daily and 10 mg twice daily XELJANZ groups combined during the first 3 months of exposure.    Confirmed lymphocyte counts less than 500 cells/mm3 were associated with an increased incidence of treated and serious infections      Her neutrophil count is 1300 cells/mm3    Neutropenia    In the controlled clinical trials, confirmed decreases in ANC below 1000 cells/mm3 occurred in 0.07% of patients for the 5 mg twice daily and 10 mg twice daily XELJANZ groups combined during the first 3 months of exposure.    There were no confirmed decreases in ANC below 500 cells/mm3 observed in any treatment group.    There was no clear relationship between neutropenia and the occurrence of  serious infections.    In the long-term safety population, the pattern and incidence of confirmed decreases in ANC remained consistent with what was seen in the controlled clinical trials        Lymphocyte Abnormalities    Treatment with XELJANZ was associated with initial lymphocytosis at one month of exposure followed by a gradual decrease in mean absolute lymphocyte counts below the baseline of approximately 10% during 12 months of therapy. Lymphocyte counts less than 500 cells/mm3 were associated with an increased incidence of treated and serious infections.    Avoid initiation of XELJANZ/XELJANZ XR treatment in patients with a low lymphocyte count (i.e., less than 500 cells/mm3). In patients who develop a confirmed absolute lymphocyte count less than 500 cells/mm3, treatment with XELJANZ/XELJANZ XR is not recommended.    Monitor lymphocyte counts at baseline and every 3 months thereafter.       Neutropenia    Treatment with XELJANZ was associated with an increased incidence of neutropenia (less than 2000 cells/mm3) compared to placebo.    Avoid initiation of XELJANZ/XELJANZ XR treatment in patients with a low neutrophil count (i.e., ANC less than 1000 cells/mm3). For patients who develop a persistent ANC of 500 to 1000 cells/mm3, interrupt XELJANZ/XELJANZ XR dosing until ANC is greater than or equal to 1000 cells/mm3. In patients who develop an ANC less than 500 cells/mm3, treatment with XELJANZ/XELJANZ XR is not recommended.    Monitor neutrophil counts at baseline and after 4-8 weeks of treatment and every 3 months thereafter.     Anemia    Avoid initiation of XELJANZ/XELJANZ XR treatment in patients with a low hemoglobin level (i.e., less than 9 g/dL). Treatment with XELJANZ/XELJANZ XR should be interrupted in patients who develop hemoglobin levels less than 8 g/dL or whose hemoglobin level drops greater than 2 g/dL on treatment.    So at this point we decided to continue the xeljanz 5 mg bid and IVIG      But we sent her to hematology and the counts further dropped     10/12/2020  We cut the xeljanz dose to 5 mg daily  No more swelling  Face looks clear,not much redness  The eyelid is not puffy and not intensely erythematous   Along the nasal folds there is some redness but again not intense  On the MCPs and Dorsal hands she has some erythema which is more pinkish  Not itchy  Sometimes these pink areas can turn pigmented  Now complaint to sunscreen      White count has improved from 2.72 to 2.80  H/h 11.3/34.7  plts nml  ANC has improved from 0.6 to 1.4  Lymphocyte count 0.9    Ck,aldolase nml  CRP nml  ESR 65      2/2021  Now completely off prednisone    Some hot flashes at nights  Not documented temperatures/some night sweats  Recent cancer screening negative  Ct chest,abdomen and pelvis and chest    She has fatigue if she has to do a lot of things  Facial rash is stable  On the hands : redness is stable  Sometimes left ankle and left knee is swollen,painful/tight   Not muscular weakness but she feels skinny  BMI is 29.39 though    White count 4.30  H/H 11.6/35.4  plts 223  Lymphocytes nml    AST went upto 52  Rest of LFTs ok  GFR nml  ESR > 120  CRP nml  CK,aldolase nml        2/2021 we made her xeljanz 7.5 mg daily  Gets IVIG     4/2021  Dermatology said    Patient appears to have 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 No need for addition of systemic steroids with controlled myopathy and otherwise absence of systemic disease due to lack of efficacy in controlling cutaneous disease, side effect profile.   Currently with evidence of both nonvasculopathic cutaneous disease (heliotrophe, facial rash, Gottron's papules) as well as a degree vasculopathic disease (nailfold vascular changes, evidence digital pulp ulcers/lesions, associated pain at these locations). Overall patient doing well, states QOL not greatly affected by residual  rashes.      Considerations for comorbid rosacea/seb derm at face (micropustules on exam today; some scaling at brows, nasal creases noted).      Recommendations:  - Strict sun protection measures always.   - Optimization of topicals as above: Elidel mixed 50/50 with ketoconazole cream BID PRN rash at face; Plexion wash daily at face; transition to Diprolene cream for rash at hands.   - Offered ILK to rash at hands, patient again declined.   - Consideration for addition of vasodilatory agent (e.g. nifedipine) for relief in associated pain at fingertip lesions.   - Otherwise c/w rheumatologic management with IVIG, tofacitinib.      Labs 4/2021    H/h 11.3/36.2  nml white count 5.27  plts nml    5/2021    Patient would like to consider steroid/intralesional kenalog injections  She had stopped xeljanz on 4/28 for the covid vaccine   White count was done 2 days after 4/28 which is on 4/30 and the count was 5.27  She got covid vaccine on 3/29   Stopped xeljanz till 4/6  Did the blood work on 4/8 and was on xeljanz at that time  So her white count was 3.80  Prior to that she was 4.30 and 5.19    CMP nml      5/2021  She has shown remarkable improvement   She used to be very erythematous on the face and the hands,but that has significantly resolved  She had rashes on the chest,upper back and legs and that has resolved  She had calcinosis and that has resolved  She has no muscle weakness  All she has is mild erythema on the forehead,nose,around the nasolabial fold,heliotrope rash on the eyelids,gottrons papules on the MCPs and PIPs   She has digital pitting with no active ulcers  She has nail fold capillary changes     10/2021    She is doing really well  She has not needed steroid creams or injections  Skin looks really good  She is not puffy,she is not weak    Left foot has a stabbing pain after she walks around for some time  Some aches and pains-exam nml  May be neuropathy    8/2021    CRP 15.8  ESR > 130  She had sinus  issues at this time  Ck,aldolase nml  AST still 51  ALT nml  GFR nml  H/h 11.6/34.7  nml white count,4.90  nml plts  GGT nml  Vit d nml    1/2022    She is doing really really well  No more red and pink rashes  No more edema  On xeljanz 5 mg daily/was on 7.5 mg daily   Insurance is denying-so appeal   Patient really needs to continue the current regimen since she has done really well,she has tried and failed many other drugs and stopping xeljanz will only cause flare up of the disease    Labs     Ck,aldolase nml  CRP nml  ESR 67  CMP nml  CBC nml      5/2022    She took xeljanz 5 mg daily for 2 to 3 months and then didn't have medication for a month- thankfully has not flared in the skin and muscles  Today's labs are pending - protein high,LFTs nml,CK slowly trending up,CRP mild elevation  IVIG - is still monthly   Exam-no rash and muscle strength normal      9/2022      On xeljanz 5 mg daily  She feels great  IVIG is monthly-but we have cut the dose to 1 g/kg monthly not 2 g/kg monthly    Right hand -looks a little flared - this is her driving hand     Aldolase nml  -Dclwbo-izgpis trending up  CRP nml  ESR Down to 67,it was > 120  CMP- AST upto 50  ALT nml  CBC - H/H 11.5/34.1,Nml plts,white count       Plan     -never had blood clot  -never had diverticulitis    Will just continue xeljanz but increase to 7.5 mg daily    For now we will continue IVIG 1 g/kg   Next time if needed we can double up the dose to 2 g/kg    Off steroids    Will wait for 3 months-make sure she has stable disease    Dermatology -f/u    Sun screen overtly emphasised    - continue muscle strengthening exercise daily    CBC,CMP,ESR,CRP,ck,aldolase in 3 months    Follow up in 3 months     I had referred her to rheum-derm clinic,but looks like she is stable enough to be seen by me and derm separately    Vaccines  Covid x 2  Booster needed  Flu utd  Pneumonia utd  Shingles     DXA-osteopenia -2020  Needs a new one  - 1200 mg dietary calcium  and Vitamin D3 1000 mg daily      Answers submitted by the patient for this visit:  Rheumatology Questionnaire (Submitted on 9/12/2022)  mouth sores: No  trouble swallowing: No  unexpected weight change: No  genital sore: No

## 2022-09-13 NOTE — PROGRESS NOTES
"Subjective:       Patient ID: Ermelinda Verde is a 61 y.o. female.    Chief Complaint: HTN f/u    HPI  At our last visit, stopped on nifedipine 60mg daily (for BP and also fingertip lesions) due to leg swelling and weight gain. Started on chlorthalidone 25mg daily. Here for f/u.   Of note, last echo 1/27/20 w/ normal EF 60%.  Thinks the leg swelling has resolved. BP is still in the 150s at home.      At our last visit, also discussed LLE palpable mass. US 7/20/22 showed:  In the area the palpable abnormality in the lower left medial leg is a 0.8 x 0.9 x 1.5 cm solid mass with a slightly irregular border with no flow considered indeterminate recommend close follow-up.  Concern continues a CT scan with IV contrast may be helpful for further evaluation  -->ordered CT tib fib but has not been done yet. Notified that there is still a shortage of contrast.   Reports the "knot" is so much smaller now that the swelling is much improved. Has been there since at least 2019.     Currently on Privigen infusion every 4 weeks for dermatomyositis. Last one 8/31/22. Still taking xeljanz.     Review of Systems  Comprehensive review of systems otherwise negative. See history/subjective section for more details.    Objective:      Physical Exam    BP (!) 156/84 (BP Location: Left arm, Patient Position: Sitting, BP Method: Large (Manual))   Pulse 86   Ht 5' 5" (1.651 m)   Wt 82.9 kg (182 lb 12.2 oz)   LMP  (LMP Unknown)   SpO2 100%   BMI 30.41 kg/m²     GEN - A+OX4, NAD   HEENT - PERRL, EOMI, OP clear. MMM. TM normal.   CV - RRR, no m/r   Chest - CTAB, no wheezing or rhonchi  Abd - S/NT/ND/+BS.   Ext - 2+BDP and radial pulses. No LE edema.   MSK - no spinal tenderness to palpation. Normal gait. LLE medial calf w/ hard well defined mobile, nontender mass that's smaller than at previous visit.   Skin - No rash. Taut skin.      Labs reviewed.     Assessment/Plan     Ermelinda was seen today for follow-up.    Diagnoses and all orders " for this visit:    Benign essential hypertension - add back procardia xl 30mg daily (had trouble w/ LE edema on the 60mg daily) to chlorthalidone 25mg daily. BMP today.   -     BASIC METABOLIC PANEL; Future  -     NIFEdipine (PROCARDIA-XL) 30 MG (OSM) 24 hr tablet; Take 1 tablet (30 mg total) by mouth once daily.    Leg mass, left - still w/ contrast shortage. Will repeat US in Jan 2023. Smaller now that leg swelling improved off of procardia 60.     Dermatomyositis - cont current meds. Reports doing OK.     Schedule MMG for Dec.   Reminded about ACP.   Follow up in about 5 weeks (around 10/18/2022).      Reta Weeks MD  Department of Internal Medicine - [facility]  7:31 AM

## 2022-09-13 NOTE — PROGRESS NOTES
Rapid3 Question Responses and Scores 9/12/2022   MDHAQ Score 0.2   Psychologic Score 3.3   Pain Score 0.5   When you awakened in the morning OVER THE LAST WEEK, did you feel stiff? Yes   If Yes, please indicate the number of hours until you are as limber as you will be for the day 1   Fatigue Score 0.5   Global Health Score 1   RAPID3 Score 0.72     Answers submitted by the patient for this visit:  Rheumatology Questionnaire (Submitted on 9/12/2022)  fever: No  eye redness: No  mouth sores: No  headaches: No  shortness of breath: No  chest pain: No  trouble swallowing: No  diarrhea: No  constipation: No  unexpected weight change: No  genital sore: No  dysuria: No  During the last 3 days, have you had a skin rash?: No  Bruises or bleeds easily: No  cough: No

## 2022-09-21 ENCOUNTER — IMMUNIZATION (OUTPATIENT)
Dept: FAMILY MEDICINE | Facility: CLINIC | Age: 62
End: 2022-09-21
Payer: MEDICARE

## 2022-09-21 DIAGNOSIS — Z23 NEED FOR VACCINATION: Primary | ICD-10-CM

## 2022-09-21 PROCEDURE — 91313 COVID-19, MRNA, LNP-S, BIVALENT BOOSTER, PF, 50 MCG/0.5 ML: ICD-10-PCS | Mod: S$GLB,,, | Performed by: FAMILY MEDICINE

## 2022-09-21 PROCEDURE — 91313 COVID-19, MRNA, LNP-S, BIVALENT BOOSTER, PF, 50 MCG/0.5 ML: CPT | Mod: S$GLB,,, | Performed by: FAMILY MEDICINE

## 2022-09-21 PROCEDURE — 0134A COVID-19, MRNA, LNP-S, BIVALENT BOOSTER, PF, 50 MCG/0.5 ML: CPT | Mod: PBBFAC | Performed by: FAMILY MEDICINE

## 2022-09-28 ENCOUNTER — INFUSION (OUTPATIENT)
Dept: INFECTIOUS DISEASES | Facility: HOSPITAL | Age: 62
End: 2022-09-28
Attending: INTERNAL MEDICINE
Payer: MEDICARE

## 2022-09-28 VITALS
HEART RATE: 98 BPM | RESPIRATION RATE: 16 BRPM | SYSTOLIC BLOOD PRESSURE: 152 MMHG | WEIGHT: 182.88 LBS | OXYGEN SATURATION: 100 % | BODY MASS INDEX: 30.43 KG/M2 | DIASTOLIC BLOOD PRESSURE: 81 MMHG

## 2022-09-28 DIAGNOSIS — M33.13 DERMATOMYOSITIS: Primary | ICD-10-CM

## 2022-09-28 DIAGNOSIS — R13.19 ESOPHAGEAL DYSPHAGIA: ICD-10-CM

## 2022-09-28 PROCEDURE — 96365 THER/PROPH/DIAG IV INF INIT: CPT

## 2022-09-28 PROCEDURE — 63600175 PHARM REV CODE 636 W HCPCS: Mod: JG | Performed by: INTERNAL MEDICINE

## 2022-09-28 PROCEDURE — 25000003 PHARM REV CODE 250: Performed by: INTERNAL MEDICINE

## 2022-09-28 PROCEDURE — 96366 THER/PROPH/DIAG IV INF ADDON: CPT

## 2022-09-28 PROCEDURE — 96375 TX/PRO/DX INJ NEW DRUG ADDON: CPT

## 2022-09-28 RX ORDER — SODIUM CHLORIDE 0.9 % (FLUSH) 0.9 %
10 SYRINGE (ML) INJECTION
Status: DISCONTINUED | OUTPATIENT
Start: 2022-09-28 | End: 2022-09-28 | Stop reason: HOSPADM

## 2022-09-28 RX ORDER — HEPARIN 100 UNIT/ML
500 SYRINGE INTRAVENOUS
Status: CANCELLED | OUTPATIENT
Start: 2022-10-26

## 2022-09-28 RX ORDER — SODIUM CHLORIDE 0.9 % (FLUSH) 0.9 %
10 SYRINGE (ML) INJECTION
Status: CANCELLED | OUTPATIENT
Start: 2022-10-26

## 2022-09-28 RX ORDER — ACETAMINOPHEN 325 MG/1
650 TABLET ORAL
Status: COMPLETED | OUTPATIENT
Start: 2022-09-28 | End: 2022-09-28

## 2022-09-28 RX ORDER — FAMOTIDINE 10 MG/ML
20 INJECTION INTRAVENOUS
Status: COMPLETED | OUTPATIENT
Start: 2022-09-28 | End: 2022-09-28

## 2022-09-28 RX ORDER — DIPHENHYDRAMINE HYDROCHLORIDE 50 MG/ML
50 INJECTION INTRAMUSCULAR; INTRAVENOUS
Status: CANCELLED
Start: 2022-10-26

## 2022-09-28 RX ORDER — ACETAMINOPHEN 325 MG/1
650 TABLET ORAL
Status: CANCELLED | OUTPATIENT
Start: 2022-10-26

## 2022-09-28 RX ORDER — DIPHENHYDRAMINE HYDROCHLORIDE 50 MG/ML
50 INJECTION INTRAMUSCULAR; INTRAVENOUS
Status: COMPLETED | OUTPATIENT
Start: 2022-09-28 | End: 2022-09-28

## 2022-09-28 RX ORDER — FAMOTIDINE 10 MG/ML
20 INJECTION INTRAVENOUS
Status: CANCELLED | OUTPATIENT
Start: 2022-10-26

## 2022-09-28 RX ORDER — DIPHENHYDRAMINE HYDROCHLORIDE 50 MG/ML
50 INJECTION INTRAMUSCULAR; INTRAVENOUS ONCE
Status: DISCONTINUED | OUTPATIENT
Start: 2022-09-28 | End: 2023-12-18

## 2022-09-28 RX ADMIN — ACETAMINOPHEN 325MG 650 MG: 325 TABLET ORAL at 08:09

## 2022-09-28 RX ADMIN — HUMAN IMMUNOGLOBULIN G 85 G: 40 LIQUID INTRAVENOUS at 08:09

## 2022-09-28 RX ADMIN — FAMOTIDINE 20 MG: 10 INJECTION INTRAVENOUS at 08:09

## 2022-09-28 RX ADMIN — SODIUM CHLORIDE: 0.9 INJECTION, SOLUTION INTRAVENOUS at 08:09

## 2022-09-28 RX ADMIN — DIPHENHYDRAMINE HYDROCHLORIDE 50 MG: 50 INJECTION INTRAMUSCULAR; INTRAVENOUS at 08:09

## 2022-09-28 NOTE — PROGRESS NOTES
Arrived for Privigen infusion. Tylenol 650 mg po, Pepcid 20 mg IVP, and Benadryl 50 IVP administered 30 minutes after pre-medications. Privigen initiated at the following rates with titration every 30 minutes; 25ml/hr-50ml/hr-100 ml/hr-199ml/hr, NS  250cc bag @ 25ml/hr concurrently with Privigen. Tolerated without any difficulty.Left unit in NAD.

## 2022-10-18 ENCOUNTER — PATIENT MESSAGE (OUTPATIENT)
Dept: PRIMARY CARE CLINIC | Facility: CLINIC | Age: 62
End: 2022-10-18
Payer: MEDICARE

## 2022-10-21 ENCOUNTER — TELEPHONE (OUTPATIENT)
Dept: PRIMARY CARE CLINIC | Facility: CLINIC | Age: 62
End: 2022-10-21
Payer: MEDICARE

## 2022-10-25 ENCOUNTER — HOSPITAL ENCOUNTER (OUTPATIENT)
Dept: RADIOLOGY | Facility: CLINIC | Age: 62
Discharge: HOME | End: 2022-10-25
Attending: INTERNAL MEDICINE
Payer: MEDICARE

## 2022-10-25 DIAGNOSIS — M81.0 OSTEOPOROSIS, UNSPECIFIED OSTEOPOROSIS TYPE, UNSPECIFIED PATHOLOGICAL FRACTURE PRESENCE: ICD-10-CM

## 2022-10-25 PROCEDURE — 77080 DXA BONE DENSITY AXIAL: CPT | Mod: TC

## 2022-10-25 PROCEDURE — 77080 DEXA BONE DENSITY SPINE HIP: ICD-10-PCS | Mod: 26,,, | Performed by: INTERNAL MEDICINE

## 2022-10-25 PROCEDURE — 77080 DXA BONE DENSITY AXIAL: CPT | Mod: 26,,, | Performed by: INTERNAL MEDICINE

## 2022-10-26 ENCOUNTER — INFUSION (OUTPATIENT)
Dept: INFECTIOUS DISEASES | Facility: HOSPITAL | Age: 62
End: 2022-10-26
Attending: INTERNAL MEDICINE
Payer: MEDICARE

## 2022-10-26 VITALS
BODY MASS INDEX: 29.86 KG/M2 | WEIGHT: 179.44 LBS | HEART RATE: 100 BPM | TEMPERATURE: 98 F | DIASTOLIC BLOOD PRESSURE: 85 MMHG | SYSTOLIC BLOOD PRESSURE: 146 MMHG | RESPIRATION RATE: 18 BRPM | OXYGEN SATURATION: 100 %

## 2022-10-26 DIAGNOSIS — R13.19 ESOPHAGEAL DYSPHAGIA: ICD-10-CM

## 2022-10-26 DIAGNOSIS — M33.13 DERMATOMYOSITIS: Primary | ICD-10-CM

## 2022-10-26 PROCEDURE — 63600175 PHARM REV CODE 636 W HCPCS: Mod: JG | Performed by: INTERNAL MEDICINE

## 2022-10-26 PROCEDURE — 96375 TX/PRO/DX INJ NEW DRUG ADDON: CPT

## 2022-10-26 PROCEDURE — 96365 THER/PROPH/DIAG IV INF INIT: CPT

## 2022-10-26 PROCEDURE — 25000003 PHARM REV CODE 250: Performed by: INTERNAL MEDICINE

## 2022-10-26 PROCEDURE — 96366 THER/PROPH/DIAG IV INF ADDON: CPT

## 2022-10-26 RX ORDER — FAMOTIDINE 10 MG/ML
20 INJECTION INTRAVENOUS
Status: COMPLETED | OUTPATIENT
Start: 2022-10-26 | End: 2022-10-26

## 2022-10-26 RX ORDER — FAMOTIDINE 10 MG/ML
20 INJECTION INTRAVENOUS
Status: CANCELLED | OUTPATIENT
Start: 2022-11-23

## 2022-10-26 RX ORDER — DIPHENHYDRAMINE HYDROCHLORIDE 50 MG/ML
50 INJECTION INTRAMUSCULAR; INTRAVENOUS
Status: COMPLETED | OUTPATIENT
Start: 2022-10-26 | End: 2022-10-26

## 2022-10-26 RX ORDER — HEPARIN 100 UNIT/ML
500 SYRINGE INTRAVENOUS
Status: CANCELLED | OUTPATIENT
Start: 2022-11-23

## 2022-10-26 RX ORDER — ACETAMINOPHEN 325 MG/1
650 TABLET ORAL
Status: COMPLETED | OUTPATIENT
Start: 2022-10-26 | End: 2022-10-26

## 2022-10-26 RX ORDER — SODIUM CHLORIDE 0.9 % (FLUSH) 0.9 %
10 SYRINGE (ML) INJECTION
Status: CANCELLED | OUTPATIENT
Start: 2022-11-23

## 2022-10-26 RX ORDER — ACETAMINOPHEN 325 MG/1
650 TABLET ORAL
Status: CANCELLED | OUTPATIENT
Start: 2022-11-23

## 2022-10-26 RX ORDER — SODIUM CHLORIDE 0.9 % (FLUSH) 0.9 %
10 SYRINGE (ML) INJECTION
Status: DISCONTINUED | OUTPATIENT
Start: 2022-10-26 | End: 2022-10-26 | Stop reason: HOSPADM

## 2022-10-26 RX ORDER — DIPHENHYDRAMINE HYDROCHLORIDE 50 MG/ML
50 INJECTION INTRAMUSCULAR; INTRAVENOUS
Status: CANCELLED
Start: 2022-11-23

## 2022-10-26 RX ADMIN — ACETAMINOPHEN 650 MG: 325 TABLET ORAL at 08:10

## 2022-10-26 RX ADMIN — FAMOTIDINE 20 MG: 10 INJECTION, SOLUTION INTRAVENOUS at 08:10

## 2022-10-26 RX ADMIN — DIPHENHYDRAMINE HYDROCHLORIDE 50 MG: 50 INJECTION, SOLUTION INTRAMUSCULAR; INTRAVENOUS at 08:10

## 2022-10-26 RX ADMIN — HUMAN IMMUNOGLOBULIN G 80 G: 20 LIQUID INTRAVENOUS at 08:10

## 2022-10-26 RX ADMIN — SODIUM CHLORIDE: 0.9 INJECTION, SOLUTION INTRAVENOUS at 08:10

## 2022-10-26 NOTE — PROGRESS NOTES
Arrived for Privigen infusion. Tylenol 650 mg po, Pepcid 20 mg IVP, and Benadryl 50 IVP administered 30 minutes after pre-medications. Privigen initiated at the following rates with titration every 30 minutes; 24ml/hr-48ml/hr-97 ml/hr-195ml/hr, NS  250cc bag @ 25ml/hr concurrently with Privigen. Tolerated without any difficulty.Left unit in NAD.

## 2022-11-10 ENCOUNTER — OFFICE VISIT (OUTPATIENT)
Dept: PRIMARY CARE CLINIC | Facility: CLINIC | Age: 62
End: 2022-11-10
Payer: MEDICARE

## 2022-11-10 VITALS
OXYGEN SATURATION: 100 % | HEIGHT: 65 IN | WEIGHT: 179.88 LBS | BODY MASS INDEX: 29.97 KG/M2 | HEART RATE: 94 BPM | DIASTOLIC BLOOD PRESSURE: 80 MMHG | SYSTOLIC BLOOD PRESSURE: 134 MMHG

## 2022-11-10 DIAGNOSIS — I70.0 AORTIC ATHEROSCLEROSIS: ICD-10-CM

## 2022-11-10 DIAGNOSIS — I73.9 PAD (PERIPHERAL ARTERY DISEASE): ICD-10-CM

## 2022-11-10 DIAGNOSIS — I10 BENIGN ESSENTIAL HYPERTENSION: Primary | ICD-10-CM

## 2022-11-10 DIAGNOSIS — E78.5 HYPERLIPIDEMIA, UNSPECIFIED HYPERLIPIDEMIA TYPE: ICD-10-CM

## 2022-11-10 PROCEDURE — 99214 OFFICE O/P EST MOD 30 MIN: CPT | Mod: S$GLB,,, | Performed by: INTERNAL MEDICINE

## 2022-11-10 PROCEDURE — 1160F PR REVIEW ALL MEDS BY PRESCRIBER/CLIN PHARMACIST DOCUMENTED: ICD-10-PCS | Mod: CPTII,S$GLB,, | Performed by: INTERNAL MEDICINE

## 2022-11-10 PROCEDURE — 1159F PR MEDICATION LIST DOCUMENTED IN MEDICAL RECORD: ICD-10-PCS | Mod: CPTII,S$GLB,, | Performed by: INTERNAL MEDICINE

## 2022-11-10 PROCEDURE — 3075F SYST BP GE 130 - 139MM HG: CPT | Mod: CPTII,S$GLB,, | Performed by: INTERNAL MEDICINE

## 2022-11-10 PROCEDURE — 99999 PR PBB SHADOW E&M-EST. PATIENT-LVL IV: CPT | Mod: PBBFAC,,, | Performed by: INTERNAL MEDICINE

## 2022-11-10 PROCEDURE — 3079F PR MOST RECENT DIASTOLIC BLOOD PRESSURE 80-89 MM HG: ICD-10-PCS | Mod: CPTII,S$GLB,, | Performed by: INTERNAL MEDICINE

## 2022-11-10 PROCEDURE — 3075F PR MOST RECENT SYSTOLIC BLOOD PRESS GE 130-139MM HG: ICD-10-PCS | Mod: CPTII,S$GLB,, | Performed by: INTERNAL MEDICINE

## 2022-11-10 PROCEDURE — 99999 PR PBB SHADOW E&M-EST. PATIENT-LVL IV: ICD-10-PCS | Mod: PBBFAC,,, | Performed by: INTERNAL MEDICINE

## 2022-11-10 PROCEDURE — 1159F MED LIST DOCD IN RCRD: CPT | Mod: CPTII,S$GLB,, | Performed by: INTERNAL MEDICINE

## 2022-11-10 PROCEDURE — 1160F RVW MEDS BY RX/DR IN RCRD: CPT | Mod: CPTII,S$GLB,, | Performed by: INTERNAL MEDICINE

## 2022-11-10 PROCEDURE — 3079F DIAST BP 80-89 MM HG: CPT | Mod: CPTII,S$GLB,, | Performed by: INTERNAL MEDICINE

## 2022-11-10 PROCEDURE — 3008F BODY MASS INDEX DOCD: CPT | Mod: CPTII,S$GLB,, | Performed by: INTERNAL MEDICINE

## 2022-11-10 PROCEDURE — 3008F PR BODY MASS INDEX (BMI) DOCUMENTED: ICD-10-PCS | Mod: CPTII,S$GLB,, | Performed by: INTERNAL MEDICINE

## 2022-11-10 PROCEDURE — 99214 PR OFFICE/OUTPT VISIT, EST, LEVL IV, 30-39 MIN: ICD-10-PCS | Mod: S$GLB,,, | Performed by: INTERNAL MEDICINE

## 2022-11-10 NOTE — PROGRESS NOTES
"Subjective:       Patient ID: Ermelinda Verde is a 61 y.o. female.    Chief Complaint: HTN Follow-up    HPI  HTN - at our last visit, added back procardia xl 30mg daily (60mg-->LE edema) and cont on chlorthalidone 25mg daily.   Checks BP at home and SBP is 120s-130s.   Read about side effects of nifedipine and was a little concerned about mention of angina.   Has dermatomyositis. Follows w/ Dr. FUNEZ. The regimen is working out for her.   Also was concerned about staying on crestor for her cholesterol.     Review of Systems  Comprehensive review of systems otherwise negative. See history/subjective section for more details.    Objective:      Physical Exam    /80 (BP Location: Left arm, Patient Position: Sitting, BP Method: Large (Manual))   Pulse 94   Ht 5' 5" (1.651 m)   Wt 81.6 kg (179 lb 14.3 oz)   LMP  (LMP Unknown)   SpO2 100%   BMI 29.94 kg/m²     GEN - A+OX4, NAD   HEENT - PERRL, EOMI, OP clear. MMM. TM normal.   CV - RRR, no m/r   Chest - CTAB, no wheezing or rhonchi  Abd - S/NT/ND/+BS.   Ext - 2+BDP and radial pulses. No LE edema.   MSK - no spinal tenderness to palpation. Normal gait.   Skin - No rash. Taut skin.     Previous labs reviewed.   Reviewed findings on her CT A/P 5/24/22:  FINDINGS:  LUNG BASES, MEDIASTINUM AND THORACIC SOFT TISSUES: Partially visualized postsurgical changes of left mastectomy and reconstruction.  Stable appearing circumscribed 1.3 cm fat density lesion at the medial aspect of the left breast, likely postsurgical and unchanged compared to prior.  Coronary arterial atherosclerosis.  Unchanged small linear opacities in the left lung base, likely subsegmental atelectasis.     HEPATOBILIARY: Stable, numerous hypodense lesions throughout the hepatic parenchyma with the largest measuring 2.5 cm, likely simple cysts.  8 mm hypodensity inferior aspect of the right lobe series 2, image 82 not definitely seen on the prior study.  No biliary ductal dilatation. Normal " gallbladder.     SPLEEN:  No splenomegaly.     PANCREAS: 4 mm hypodensity pancreatic tail series 2, image 59. no ductal dilatation.     ADRENALS:   No adrenal nodules.     KIDNEYS/URETERS:  No hydronephrosis, stones, or solid mass lesions.     PELVIC ORGANS/BLADDER: Bladder is mildly distended without focally thickened wall.  Stable appearance of lobulated, bulky uterus with numerous calcifications, likely calcified fibroids, unchanged prior.     PERITONEUM / RETROPERITONEUM:  No free air or fluid.     LYMPH NODES: Scattered nonenlarged para-aortic nodes.  No lymphadenopathy.     VESSELS: Trace calcific aortoiliac atherosclerosis.     GI TRACT:  No distention or wall thickening. Normal appendix.     BONES AND ABDOMINAL SOFT TISSUES: Osteopenia.  Stable appearing dextroscoliosis of the thoracic spine and levoscoliosis of the lumbar spine.  No fractures or focal osseous lesions.     Assessment/Plan     Devery was seen today for follow-up.    Diagnoses and all orders for this visit:    Benign essential hypertension - Stable and controlled. Continue current medications.  Discussed risks vs benefits of nifedipine. Pt ok w/ remaining on it.     Aortic atherosclerosis - discussed aortoiliac atherosclerosis and coronary atherosclerosis on CT scan. Thus goal LDL should be <70 and it is at goal w/ crestor 10mg daily currently. No claudication or active cardiac symptoms. Ok w/ remaining on crestor after explaining risks vs benefits lana w/ lowering cardiovascular event risks.     PAD (peripheral artery disease) - as above.     Hyperlipidemia, unspecified hyperlipidemia type - as above.     Pt has labs scheduled for Dr. FUNEZ in Dec.     Follow up in about 4 months (around 3/10/2023).      Reta Weeks MD  Department of Internal Medicine - [facility]  10:37 AM

## 2022-11-25 ENCOUNTER — INFUSION (OUTPATIENT)
Dept: INFECTIOUS DISEASES | Facility: HOSPITAL | Age: 62
End: 2022-11-25
Attending: INTERNAL MEDICINE
Payer: MEDICARE

## 2022-11-25 VITALS
TEMPERATURE: 98 F | DIASTOLIC BLOOD PRESSURE: 78 MMHG | WEIGHT: 178.81 LBS | RESPIRATION RATE: 18 BRPM | HEART RATE: 110 BPM | SYSTOLIC BLOOD PRESSURE: 142 MMHG | OXYGEN SATURATION: 100 % | BODY MASS INDEX: 29.75 KG/M2

## 2022-11-25 DIAGNOSIS — R13.19 ESOPHAGEAL DYSPHAGIA: ICD-10-CM

## 2022-11-25 DIAGNOSIS — M33.13 DERMATOMYOSITIS: Primary | ICD-10-CM

## 2022-11-25 PROCEDURE — 96365 THER/PROPH/DIAG IV INF INIT: CPT

## 2022-11-25 PROCEDURE — 63600175 PHARM REV CODE 636 W HCPCS: Performed by: INTERNAL MEDICINE

## 2022-11-25 PROCEDURE — 96366 THER/PROPH/DIAG IV INF ADDON: CPT

## 2022-11-25 PROCEDURE — 25000003 PHARM REV CODE 250: Performed by: INTERNAL MEDICINE

## 2022-11-25 PROCEDURE — 96375 TX/PRO/DX INJ NEW DRUG ADDON: CPT

## 2022-11-25 RX ORDER — DIPHENHYDRAMINE HYDROCHLORIDE 50 MG/ML
50 INJECTION INTRAMUSCULAR; INTRAVENOUS
Status: CANCELLED
Start: 2022-12-23

## 2022-11-25 RX ORDER — ACETAMINOPHEN 325 MG/1
650 TABLET ORAL
Status: COMPLETED | OUTPATIENT
Start: 2022-11-25 | End: 2022-11-25

## 2022-11-25 RX ORDER — FAMOTIDINE 10 MG/ML
20 INJECTION INTRAVENOUS
Status: CANCELLED | OUTPATIENT
Start: 2022-12-23

## 2022-11-25 RX ORDER — HEPARIN 100 UNIT/ML
500 SYRINGE INTRAVENOUS
Status: CANCELLED | OUTPATIENT
Start: 2022-12-23

## 2022-11-25 RX ORDER — SODIUM CHLORIDE 0.9 % (FLUSH) 0.9 %
10 SYRINGE (ML) INJECTION
Status: CANCELLED | OUTPATIENT
Start: 2022-12-23

## 2022-11-25 RX ORDER — DIPHENHYDRAMINE HYDROCHLORIDE 50 MG/ML
50 INJECTION INTRAMUSCULAR; INTRAVENOUS ONCE
Status: DISCONTINUED | OUTPATIENT
Start: 2022-11-25 | End: 2023-12-18

## 2022-11-25 RX ORDER — SODIUM CHLORIDE 0.9 % (FLUSH) 0.9 %
10 SYRINGE (ML) INJECTION
Status: DISCONTINUED | OUTPATIENT
Start: 2022-11-25 | End: 2022-11-25 | Stop reason: HOSPADM

## 2022-11-25 RX ORDER — FAMOTIDINE 10 MG/ML
20 INJECTION INTRAVENOUS
Status: COMPLETED | OUTPATIENT
Start: 2022-11-25 | End: 2022-11-25

## 2022-11-25 RX ORDER — ACETAMINOPHEN 325 MG/1
650 TABLET ORAL
Status: CANCELLED | OUTPATIENT
Start: 2022-12-23

## 2022-11-25 RX ORDER — DIPHENHYDRAMINE HYDROCHLORIDE 50 MG/ML
50 INJECTION INTRAMUSCULAR; INTRAVENOUS
Status: COMPLETED | OUTPATIENT
Start: 2022-11-25 | End: 2022-11-25

## 2022-11-25 RX ADMIN — ACETAMINOPHEN 650 MG: 325 TABLET ORAL at 08:11

## 2022-11-25 RX ADMIN — DIPHENHYDRAMINE HYDROCHLORIDE 50 MG: 50 INJECTION INTRAMUSCULAR; INTRAVENOUS at 08:11

## 2022-11-25 RX ADMIN — FAMOTIDINE 20 MG: 10 INJECTION INTRAVENOUS at 08:11

## 2022-11-25 RX ADMIN — HUMAN IMMUNOGLOBULIN G 80 G: 40 LIQUID INTRAVENOUS at 08:11

## 2022-11-25 RX ADMIN — SODIUM CHLORIDE: 0.9 INJECTION, SOLUTION INTRAVENOUS at 08:11

## 2022-11-25 NOTE — PROGRESS NOTES
Arrived for Privigen infusion. Tylenol 650 mg po, Pepcid 20 mg IVP, and Benadryl 50 IVP administered 30 minutes after pre-medications. Privigen initiated at the following rates with titration every 30 minutes; 24ml/hr-48ml/hr-97 ml/hr-194ml/hr, NS  250cc bag @ 25ml/hr concurrently with Privigen. Tolerated without any difficulty.Left unit in NAD.

## 2022-11-28 ENCOUNTER — PES CALL (OUTPATIENT)
Dept: ADMINISTRATIVE | Facility: CLINIC | Age: 62
End: 2022-11-28
Payer: MEDICARE

## 2022-12-01 NOTE — PROGRESS NOTES
Subjective:       Patient ID: Ermelinda Verde is a 61 y.o. female.    Chief Complaint: No chief complaint on file.    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      Her course was complicated by the development of auto-immune dermatomyositis.  She has been on IVIG and tofacitinib.     She still has some tightness in her neck. Her strength has been stable. She has no shortness of breath or pain.       Onc History:  She developed a palpable abnormality in her left breast in January 2017 which she noted on self-examination.  A diagnostic mammogram on January 19 showed a greater than 1 cm nodule in the upper outer portion of left breast.  By ultrasound this was lobulated and hypoechoic measuring 1.75 x 1.51 x 1.96 cm.     On January 24, 2017 a core needle biopsy was performed which showed infiltrating ductal carcinoma, high grade.  The tumor was ER negative, IN negative, and HER-2 negative.  A follow-up ultrasound on December 6 showed 2.5 x 2.2 x 1.5 cm left breast mass.  There was no abnormality noted in the left axilla.     She underwent sentinel lymph node biopsy on February 22.  That showed 4 negative lymph nodes.     She had 4 cycles of  Wilbur-adjuvantTaxotere and Cytoxan completed  on 5/9/17.     On June 26 she underwent left mastectomy.  That revealed 2 foci of invasive high-grade carcinoma measuring 14 mm and 1.5 mm.  Margins were negative.    She completed 4 cycles of adjuvant Adriamycin on September 12, 2017.      In October 2018, she developed some left supraclavicular lymphadenopathy which turned out to be recurrence.     A fine-needle aspirate of the lymph node was performed on October 19th.  That showed metastatic carcinoma consistent with breast primary which was ER negative, IN negative and HER 2-negative.    She then received 3 cycles of Nab paclitaxel and atezolizumab.  She last received treatment on January 3,  2019.    PET-CT in March, 2019 showed complete response.    She then had consolidative radiation therapy in May 2019.    Her treatment was complicated by the development of dermatomyositis which resulted in the lengthy hospitalization from January 22nd to February 13th, 2019.  She is followed by Rheumatology and has been treated with steroids, IVIG, and low-dose methotrexate, and Cell-cept.  She received rituximab therapy 4/13/2- and 4/27/20.      CT - 5/24/22  -KASSANDRA  Review of Systems   Constitutional:  Negative for appetite change and unexpected weight change.   HENT:  Negative for mouth sores.    Eyes:  Negative for visual disturbance.   Respiratory:  Negative for cough and shortness of breath.    Cardiovascular:  Negative for chest pain.   Gastrointestinal:  Negative for abdominal pain, constipation and diarrhea.   Genitourinary:  Negative for frequency.   Musculoskeletal:  Positive for arthralgias (mild). Negative for back pain.   Skin:  Positive for rash (improved on face).   Neurological:  Negative for weakness and headaches.   Hematological:  Negative for adenopathy.   Psychiatric/Behavioral:  The patient is not nervous/anxious.      Objective:      Physical Exam  Vitals reviewed.   Constitutional:       General: She is not in acute distress.     Appearance: She is well-developed.   Eyes:      General: No scleral icterus.  Cardiovascular:      Rate and Rhythm: Normal rate and regular rhythm.   Pulmonary:      Effort: Pulmonary effort is normal.      Breath sounds: Normal breath sounds. No wheezing or rales.   Chest:   Breasts:     Right: Normal. No mass, nipple discharge or skin change.       Abdominal:      Palpations: Abdomen is soft. There is no mass.      Tenderness: There is no abdominal tenderness.   Lymphadenopathy:      Cervical: No cervical adenopathy.      Upper Body:      Right upper body: No supraclavicular or axillary adenopathy.      Left upper body: No supraclavicular or axillary adenopathy.    Skin:     Findings: No rash.      Comments: tigtness of skin in chest /neck with post radiation change, left chest wall   Neurological:      Mental Status: She is alert and oriented to person, place, and time.   Psychiatric:         Behavior: Behavior normal.         Thought Content: Thought content normal.       Assessment:     Mammmo -  1. Malignant neoplasm of upper-outer quadrant of left breast in female, estrogen receptor negative    2. Polymyositis associated with autoimmune disease        Plan:     RTC 6 M    Route Chart for Scheduling    Med Onc Chart Routing      Follow up with physician 6 months.   Follow up with STEVIE    Infusion scheduling note    Injection scheduling note    Labs    Imaging    Pharmacy appointment    Other referrals          Therapy Plan Information  Pre-Medications  acetaminophen tablet 650 mg  650 mg, Oral, Every 4 weeks  diphenhydrAMINE (BENADRYL) 50 mg in NS 50 mL IVPB  50 mg, Intravenous, Every 4 weeks  famotidine (PF) injection 20 mg  20 mg, Intravenous, Every 4 weeks  4. Pre-Medications  diphenhydrAMINE injection 50 mg  50 mg, Intravenous, Every 4 weeks  Medications  Immune Globulin G (IGG)-PRO-IGA 10 % injection (Privigen) 10 % injection 80 g  1 g/kg = 80 g, Intravenous, Every 4 weeks  Flushes  sodium chloride 0.9% 250 mL flush bag  Intravenous, Every 4 weeks  sodium chloride 0.9% flush 10 mL  10 mL, Intravenous, Every 4 weeks  heparin, porcine (PF) 100 unit/mL injection flush 500 Units  500 Units, Intravenous, Every 4 weeks  alteplase injection 2 mg  2 mg, Intra-Catheter, Every 4 weeks

## 2022-12-05 ENCOUNTER — HOSPITAL ENCOUNTER (OUTPATIENT)
Dept: RADIOLOGY | Facility: HOSPITAL | Age: 62
Discharge: HOME | End: 2022-12-05
Attending: INTERNAL MEDICINE
Payer: MEDICARE

## 2022-12-05 ENCOUNTER — OFFICE VISIT (OUTPATIENT)
Dept: HEMATOLOGY/ONCOLOGY | Facility: CLINIC | Age: 62
End: 2022-12-05
Payer: MEDICARE

## 2022-12-05 VITALS
HEIGHT: 65 IN | TEMPERATURE: 98 F | SYSTOLIC BLOOD PRESSURE: 148 MMHG | WEIGHT: 181.44 LBS | HEART RATE: 108 BPM | OXYGEN SATURATION: 99 % | DIASTOLIC BLOOD PRESSURE: 86 MMHG | RESPIRATION RATE: 18 BRPM | BODY MASS INDEX: 30.23 KG/M2

## 2022-12-05 DIAGNOSIS — Z12.31 ENCOUNTER FOR SCREENING MAMMOGRAM FOR MALIGNANT NEOPLASM OF BREAST: ICD-10-CM

## 2022-12-05 DIAGNOSIS — Z17.1 MALIGNANT NEOPLASM OF UPPER-OUTER QUADRANT OF LEFT BREAST IN FEMALE, ESTROGEN RECEPTOR NEGATIVE: ICD-10-CM

## 2022-12-05 DIAGNOSIS — C50.412 MALIGNANT NEOPLASM OF UPPER-OUTER QUADRANT OF LEFT BREAST IN FEMALE, ESTROGEN RECEPTOR NEGATIVE: ICD-10-CM

## 2022-12-05 DIAGNOSIS — C50.412 MALIGNANT NEOPLASM OF UPPER-OUTER QUADRANT OF LEFT BREAST IN FEMALE, ESTROGEN RECEPTOR NEGATIVE: Primary | ICD-10-CM

## 2022-12-05 DIAGNOSIS — Z17.1 MALIGNANT NEOPLASM OF UPPER-OUTER QUADRANT OF LEFT BREAST IN FEMALE, ESTROGEN RECEPTOR NEGATIVE: Primary | ICD-10-CM

## 2022-12-05 DIAGNOSIS — M35.9 POLYMYOSITIS ASSOCIATED WITH AUTOIMMUNE DISEASE: ICD-10-CM

## 2022-12-05 DIAGNOSIS — M33.20 POLYMYOSITIS ASSOCIATED WITH AUTOIMMUNE DISEASE: ICD-10-CM

## 2022-12-05 PROCEDURE — 77067 MAMMO DIGITAL SCREENING RIGHT WITH TOMO: ICD-10-PCS | Mod: 26,52,, | Performed by: RADIOLOGY

## 2022-12-05 PROCEDURE — 77067 SCR MAMMO BI INCL CAD: CPT | Mod: 26,52,, | Performed by: RADIOLOGY

## 2022-12-05 PROCEDURE — 99213 OFFICE O/P EST LOW 20 MIN: CPT | Mod: S$GLB,,, | Performed by: INTERNAL MEDICINE

## 2022-12-05 PROCEDURE — 1159F MED LIST DOCD IN RCRD: CPT | Mod: CPTII,S$GLB,, | Performed by: INTERNAL MEDICINE

## 2022-12-05 PROCEDURE — 99999 PR PBB SHADOW E&M-EST. PATIENT-LVL IV: ICD-10-PCS | Mod: PBBFAC,,, | Performed by: INTERNAL MEDICINE

## 2022-12-05 PROCEDURE — 3079F DIAST BP 80-89 MM HG: CPT | Mod: CPTII,S$GLB,, | Performed by: INTERNAL MEDICINE

## 2022-12-05 PROCEDURE — 1159F PR MEDICATION LIST DOCUMENTED IN MEDICAL RECORD: ICD-10-PCS | Mod: CPTII,S$GLB,, | Performed by: INTERNAL MEDICINE

## 2022-12-05 PROCEDURE — 3008F BODY MASS INDEX DOCD: CPT | Mod: CPTII,S$GLB,, | Performed by: INTERNAL MEDICINE

## 2022-12-05 PROCEDURE — 77063 BREAST TOMOSYNTHESIS BI: CPT | Mod: TC

## 2022-12-05 PROCEDURE — 3079F PR MOST RECENT DIASTOLIC BLOOD PRESSURE 80-89 MM HG: ICD-10-PCS | Mod: CPTII,S$GLB,, | Performed by: INTERNAL MEDICINE

## 2022-12-05 PROCEDURE — 99213 PR OFFICE/OUTPT VISIT, EST, LEVL III, 20-29 MIN: ICD-10-PCS | Mod: S$GLB,,, | Performed by: INTERNAL MEDICINE

## 2022-12-05 PROCEDURE — 3077F PR MOST RECENT SYSTOLIC BLOOD PRESSURE >= 140 MM HG: ICD-10-PCS | Mod: CPTII,S$GLB,, | Performed by: INTERNAL MEDICINE

## 2022-12-05 PROCEDURE — 77063 MAMMO DIGITAL SCREENING RIGHT WITH TOMO: ICD-10-PCS | Mod: 26,52,, | Performed by: RADIOLOGY

## 2022-12-05 PROCEDURE — 3008F PR BODY MASS INDEX (BMI) DOCUMENTED: ICD-10-PCS | Mod: CPTII,S$GLB,, | Performed by: INTERNAL MEDICINE

## 2022-12-05 PROCEDURE — 99999 PR PBB SHADOW E&M-EST. PATIENT-LVL IV: CPT | Mod: PBBFAC,,, | Performed by: INTERNAL MEDICINE

## 2022-12-05 PROCEDURE — 3077F SYST BP >= 140 MM HG: CPT | Mod: CPTII,S$GLB,, | Performed by: INTERNAL MEDICINE

## 2022-12-05 PROCEDURE — 77063 BREAST TOMOSYNTHESIS BI: CPT | Mod: 26,52,, | Performed by: RADIOLOGY

## 2022-12-07 ENCOUNTER — OFFICE VISIT (OUTPATIENT)
Dept: INTERNAL MEDICINE | Facility: CLINIC | Age: 62
End: 2022-12-07
Payer: MEDICARE

## 2022-12-07 VITALS
DIASTOLIC BLOOD PRESSURE: 88 MMHG | HEIGHT: 65 IN | HEART RATE: 94 BPM | WEIGHT: 181 LBS | OXYGEN SATURATION: 99 % | SYSTOLIC BLOOD PRESSURE: 136 MMHG | BODY MASS INDEX: 30.16 KG/M2

## 2022-12-07 DIAGNOSIS — M33.13 DERMATOMYOSITIS: ICD-10-CM

## 2022-12-07 DIAGNOSIS — K21.00 GASTROESOPHAGEAL REFLUX DISEASE WITH ESOPHAGITIS WITHOUT HEMORRHAGE: ICD-10-CM

## 2022-12-07 DIAGNOSIS — M33.20 POLYMYOSITIS ASSOCIATED WITH AUTOIMMUNE DISEASE: ICD-10-CM

## 2022-12-07 DIAGNOSIS — Z79.899 IMMUNOSUPPRESSION DUE TO DRUG THERAPY: ICD-10-CM

## 2022-12-07 DIAGNOSIS — M35.9 POLYMYOSITIS ASSOCIATED WITH AUTOIMMUNE DISEASE: ICD-10-CM

## 2022-12-07 DIAGNOSIS — D84.821 IMMUNOSUPPRESSION DUE TO DRUG THERAPY: ICD-10-CM

## 2022-12-07 DIAGNOSIS — T45.1X5A CHEMOTHERAPY-INDUCED NEUROPATHY: ICD-10-CM

## 2022-12-07 DIAGNOSIS — R73.03 PRE-DIABETES: ICD-10-CM

## 2022-12-07 DIAGNOSIS — G62.0 CHEMOTHERAPY-INDUCED NEUROPATHY: ICD-10-CM

## 2022-12-07 DIAGNOSIS — Z17.1 MALIGNANT NEOPLASM OF UPPER-OUTER QUADRANT OF LEFT BREAST IN FEMALE, ESTROGEN RECEPTOR NEGATIVE: ICD-10-CM

## 2022-12-07 DIAGNOSIS — C50.412 MALIGNANT NEOPLASM OF UPPER-OUTER QUADRANT OF LEFT BREAST IN FEMALE, ESTROGEN RECEPTOR NEGATIVE: ICD-10-CM

## 2022-12-07 DIAGNOSIS — Z00.00 ENCOUNTER FOR PREVENTIVE HEALTH EXAMINATION: Primary | ICD-10-CM

## 2022-12-07 DIAGNOSIS — I73.9 PAD (PERIPHERAL ARTERY DISEASE): ICD-10-CM

## 2022-12-07 DIAGNOSIS — I70.0 AORTIC ATHEROSCLEROSIS: ICD-10-CM

## 2022-12-07 DIAGNOSIS — R26.9 ABNORMALITY OF GAIT AND MOBILITY: ICD-10-CM

## 2022-12-07 PROBLEM — R09.89 ABNORMAL VASCULAR FLOW: Status: RESOLVED | Noted: 2021-03-08 | Resolved: 2022-12-07

## 2022-12-07 PROBLEM — R59.9 SWELLING OF LYMPH NODES: Status: RESOLVED | Noted: 2021-03-08 | Resolved: 2022-12-07

## 2022-12-07 PROBLEM — Z12.11 COLON CANCER SCREENING: Status: RESOLVED | Noted: 2019-07-29 | Resolved: 2022-12-07

## 2022-12-07 PROCEDURE — 1160F PR REVIEW ALL MEDS BY PRESCRIBER/CLIN PHARMACIST DOCUMENTED: ICD-10-PCS | Mod: CPTII,S$GLB,, | Performed by: NURSE PRACTITIONER

## 2022-12-07 PROCEDURE — 3079F PR MOST RECENT DIASTOLIC BLOOD PRESSURE 80-89 MM HG: ICD-10-PCS | Mod: CPTII,S$GLB,, | Performed by: NURSE PRACTITIONER

## 2022-12-07 PROCEDURE — 99999 PR PBB SHADOW E&M-EST. PATIENT-LVL V: CPT | Mod: PBBFAC,,, | Performed by: NURSE PRACTITIONER

## 2022-12-07 PROCEDURE — 1160F RVW MEDS BY RX/DR IN RCRD: CPT | Mod: CPTII,S$GLB,, | Performed by: NURSE PRACTITIONER

## 2022-12-07 PROCEDURE — 3079F DIAST BP 80-89 MM HG: CPT | Mod: CPTII,S$GLB,, | Performed by: NURSE PRACTITIONER

## 2022-12-07 PROCEDURE — 3008F BODY MASS INDEX DOCD: CPT | Mod: CPTII,S$GLB,, | Performed by: NURSE PRACTITIONER

## 2022-12-07 PROCEDURE — 3075F PR MOST RECENT SYSTOLIC BLOOD PRESS GE 130-139MM HG: ICD-10-PCS | Mod: CPTII,S$GLB,, | Performed by: NURSE PRACTITIONER

## 2022-12-07 PROCEDURE — 1159F MED LIST DOCD IN RCRD: CPT | Mod: CPTII,S$GLB,, | Performed by: NURSE PRACTITIONER

## 2022-12-07 PROCEDURE — G0439 PPPS, SUBSEQ VISIT: HCPCS | Mod: S$GLB,,, | Performed by: NURSE PRACTITIONER

## 2022-12-07 PROCEDURE — 1159F PR MEDICATION LIST DOCUMENTED IN MEDICAL RECORD: ICD-10-PCS | Mod: CPTII,S$GLB,, | Performed by: NURSE PRACTITIONER

## 2022-12-07 PROCEDURE — G0439 PR MEDICARE ANNUAL WELLNESS SUBSEQUENT VISIT: ICD-10-PCS | Mod: S$GLB,,, | Performed by: NURSE PRACTITIONER

## 2022-12-07 PROCEDURE — 3008F PR BODY MASS INDEX (BMI) DOCUMENTED: ICD-10-PCS | Mod: CPTII,S$GLB,, | Performed by: NURSE PRACTITIONER

## 2022-12-07 PROCEDURE — 99999 PR PBB SHADOW E&M-EST. PATIENT-LVL V: ICD-10-PCS | Mod: PBBFAC,,, | Performed by: NURSE PRACTITIONER

## 2022-12-07 PROCEDURE — 3075F SYST BP GE 130 - 139MM HG: CPT | Mod: CPTII,S$GLB,, | Performed by: NURSE PRACTITIONER

## 2022-12-07 NOTE — PATIENT INSTRUCTIONS
Counseling and Referral of Other Preventative  (Italic type indicates deductible and co-insurance are waived)    Patient Name: Ermelinda Verde  Today's Date: 12/7/2022    Health Maintenance         Date Due Completion Date    Hemoglobin A1c (Prediabetes) 10/09/2021 10/9/2020    Mammogram 10/01/2022 10/1/2021    High Dose Statin 12/05/2023 12/5/2022    DEXA Scan 10/25/2024 10/25/2022    Pneumococcal Vaccines (Age 0-64) (3 - PPSV23 if available, else PCV20) 01/27/2025 1/27/2020    Cervical Cancer Screening 12/15/2025 12/15/2020    Lipid Panel 07/20/2027 7/20/2022    TETANUS VACCINE 03/08/2028 3/8/2018    Colorectal Cancer Screening 07/29/2029 7/29/2019    Override on 11/8/2011: Done    Override on 1/1/2011: Done          Orders Placed This Encounter   Procedures    Hemoglobin A1C     The following information is provided to all patients.  This information is to help you find resources for any of the problems found today that may be affecting your health:                Living healthy guide: www.[facility].[place].gov      Understanding Diabetes: www.diabetes.org      Eating healthy: www.cdc.gov/healthyweight      CDC home safety checklist: www.cdc.gov/steadi/patient.html      Agency on Aging: www.goea.[place].[facility]      Alcoholics anonymous (AA): www.aa.org      Physical Activity: www.allen.nih.gov/yj4pbho      Tobacco use: www.quitwithusla.org   0

## 2022-12-07 NOTE — PROGRESS NOTES
"Ermelinda Verde presented for a  Medicare AWV and comprehensive Health Risk Assessment today. The following components were reviewed and updated:    Medical history  Family History  Social history  Allergies and Current Medications  Health Risk Assessment  Health Maintenance  Care Team         ** See Completed Assessments for Annual Wellness Visit within the encounter summary.**         The following assessments were completed:  Living Situation  CAGE  Depression Screening  Timed Get Up and Go  Whisper Test  Cognitive Function Screening      Nutrition Screening  ADL Screening  PAQ Screening  OPIOID Screening: Patient does not have a prescription for narcotics. Patient does not use substance         Vitals:    12/07/22 0816 12/07/22 0826   BP:  136/88   BP Location:  Right arm   Pulse:  94   SpO2:  99%   Weight: 82.1 kg (181 lb)    Height: 5' 5" (1.651 m)      Body mass index is 30.12 kg/m².  Physical Exam  Vitals and nursing note reviewed.   Constitutional:       Appearance: She is well-developed.   HENT:      Head: Normocephalic.   Cardiovascular:      Rate and Rhythm: Normal rate and regular rhythm.   Pulmonary:      Effort: Pulmonary effort is normal.      Breath sounds: Normal breath sounds.   Abdominal:      General: Bowel sounds are normal.      Palpations: Abdomen is soft.   Musculoskeletal:         General: Normal range of motion.   Skin:     General: Skin is warm and dry.   Neurological:      Mental Status: She is alert and oriented to person, place, and time.      Motor: No abnormal muscle tone.   Psychiatric:         Mood and Affect: Mood normal.             Diagnoses and health risks identified today and associated recommendations/orders:    1. Encounter for preventive health examination  Here for Health Risk Assessment/Annual Wellness Visit.  Health maintenance reviewed and updated. Follow up in one year.     2. Aortic atherosclerosis  Chronic, stable on current medications. Noted CT Abdomen/Pelvis " 5/24/22. Followed by PCP.     3. PAD (peripheral artery disease)  Chronic, stable on current medications. Followed by PCP.     4. Malignant neoplasm of upper-outer quadrant of left breast in female, estrogen receptor negative  Chronic, stable.  Followed by Oncology, PCP.     5. Chemotherapy-induced neuropathy  Chronic, stable. Followed by PCP, Oncology, Rheumatology.     6. Pre-diabetes  Chronic, stable with diet. Last A1c 6.0. Followed by PCP.  - Hemoglobin A1C; Future    7. Gastroesophageal reflux disease with esophagitis without hemorrhage  Chronic, stable.  Followed by PCP.    8. Polymyositis associated with autoimmune disease  Chronic, stable on current medications. Followed by Rheumatology, PCP.     9. Dermatomyositis  Chronic, stable on current medications. Followed by Rheumatology, PCP.     10. Immunosuppression due to drug therapy  Chronic, stable. Followed by PCP, Oncology, Rheumatology.    11. Abnormality of gait and mobility  Chronic, due to joint pain/stiffness. No reported fall, no assistive device for ambulation. Followed by PCP.      Provided Devery with a 5-10 year written screening schedule and personal prevention plan. Recommendations were developed using the USPSTF age appropriate recommendations. Education, counseling, and referrals were provided as needed. After Visit Summary printed and given to patient which includes a list of additional screenings\tests needed.    Follow up in 3 months (on 3/10/2023).with PCP    Irina Smith NP

## 2022-12-21 ENCOUNTER — INFUSION (OUTPATIENT)
Dept: INFECTIOUS DISEASES | Facility: HOSPITAL | Age: 62
End: 2022-12-21
Attending: INTERNAL MEDICINE
Payer: MEDICARE

## 2022-12-21 VITALS
SYSTOLIC BLOOD PRESSURE: 144 MMHG | RESPIRATION RATE: 18 BRPM | TEMPERATURE: 98 F | WEIGHT: 181 LBS | DIASTOLIC BLOOD PRESSURE: 88 MMHG | HEART RATE: 102 BPM | BODY MASS INDEX: 30.12 KG/M2

## 2022-12-21 DIAGNOSIS — R13.19 ESOPHAGEAL DYSPHAGIA: ICD-10-CM

## 2022-12-21 DIAGNOSIS — M33.13 DERMATOMYOSITIS: Primary | ICD-10-CM

## 2022-12-21 PROCEDURE — 25000003 PHARM REV CODE 250: Performed by: INTERNAL MEDICINE

## 2022-12-21 PROCEDURE — 63600175 PHARM REV CODE 636 W HCPCS: Mod: JG | Performed by: INTERNAL MEDICINE

## 2022-12-21 PROCEDURE — 96375 TX/PRO/DX INJ NEW DRUG ADDON: CPT

## 2022-12-21 PROCEDURE — 96366 THER/PROPH/DIAG IV INF ADDON: CPT

## 2022-12-21 PROCEDURE — 96365 THER/PROPH/DIAG IV INF INIT: CPT

## 2022-12-21 RX ORDER — DIPHENHYDRAMINE HYDROCHLORIDE 50 MG/ML
50 INJECTION INTRAMUSCULAR; INTRAVENOUS
Status: COMPLETED | OUTPATIENT
Start: 2022-12-21 | End: 2022-12-21

## 2022-12-21 RX ORDER — FAMOTIDINE 10 MG/ML
20 INJECTION INTRAVENOUS
Status: CANCELLED | OUTPATIENT
Start: 2023-01-18

## 2022-12-21 RX ORDER — ACETAMINOPHEN 325 MG/1
650 TABLET ORAL
Status: CANCELLED | OUTPATIENT
Start: 2023-01-18

## 2022-12-21 RX ORDER — FAMOTIDINE 10 MG/ML
20 INJECTION INTRAVENOUS
Status: COMPLETED | OUTPATIENT
Start: 2022-12-21 | End: 2022-12-21

## 2022-12-21 RX ORDER — SODIUM CHLORIDE 0.9 % (FLUSH) 0.9 %
10 SYRINGE (ML) INJECTION
Status: DISCONTINUED | OUTPATIENT
Start: 2022-12-21 | End: 2022-12-21 | Stop reason: HOSPADM

## 2022-12-21 RX ORDER — DIPHENHYDRAMINE HYDROCHLORIDE 50 MG/ML
50 INJECTION INTRAMUSCULAR; INTRAVENOUS
Status: CANCELLED
Start: 2023-01-18

## 2022-12-21 RX ORDER — SODIUM CHLORIDE 0.9 % (FLUSH) 0.9 %
10 SYRINGE (ML) INJECTION
Status: CANCELLED | OUTPATIENT
Start: 2023-01-18

## 2022-12-21 RX ORDER — HEPARIN 100 UNIT/ML
500 SYRINGE INTRAVENOUS
Status: DISCONTINUED | OUTPATIENT
Start: 2022-12-21 | End: 2022-12-21 | Stop reason: HOSPADM

## 2022-12-21 RX ORDER — ACETAMINOPHEN 325 MG/1
650 TABLET ORAL
Status: COMPLETED | OUTPATIENT
Start: 2022-12-21 | End: 2022-12-21

## 2022-12-21 RX ORDER — HEPARIN 100 UNIT/ML
500 SYRINGE INTRAVENOUS
Status: CANCELLED | OUTPATIENT
Start: 2023-01-18

## 2022-12-21 RX ADMIN — HUMAN IMMUNOGLOBULIN G 80 G: 20 LIQUID INTRAVENOUS at 08:12

## 2022-12-21 RX ADMIN — DIPHENHYDRAMINE HYDROCHLORIDE 50 MG: 50 INJECTION INTRAMUSCULAR; INTRAVENOUS at 08:12

## 2022-12-21 RX ADMIN — FAMOTIDINE 20 MG: 10 INJECTION, SOLUTION INTRAVENOUS at 08:12

## 2022-12-21 RX ADMIN — ACETAMINOPHEN 650 MG: 325 TABLET ORAL at 08:12

## 2022-12-21 RX ADMIN — SODIUM CHLORIDE: 9 INJECTION, SOLUTION INTRAVENOUS at 08:12

## 2022-12-21 NOTE — PROGRESS NOTES
Arrived for Privigen infusion. Tylenol 650 mg po, Pepcid 20 mg IVP, and Benadryl 50 IVP administered 30 minutes prior to infusion.  Privigen initiated at the following rates with titration every 30 minutes; 24ml/hr-49 ml/hr-98 ml/hr-197 ml/hr, NS  250cc bag @ 25ml/hr concurrently with Privigen. Tolerated without any difficulty.Left unit in NAD.

## 2022-12-28 ENCOUNTER — PATIENT MESSAGE (OUTPATIENT)
Dept: HEMATOLOGY/ONCOLOGY | Facility: CLINIC | Age: 62
End: 2022-12-28
Payer: MEDICARE

## 2023-01-19 ENCOUNTER — INFUSION (OUTPATIENT)
Dept: INFECTIOUS DISEASES | Facility: HOSPITAL | Age: 63
End: 2023-01-19
Attending: INTERNAL MEDICINE
Payer: MEDICARE

## 2023-01-19 VITALS
SYSTOLIC BLOOD PRESSURE: 157 MMHG | TEMPERATURE: 98 F | OXYGEN SATURATION: 99 % | HEART RATE: 108 BPM | BODY MASS INDEX: 30.63 KG/M2 | DIASTOLIC BLOOD PRESSURE: 78 MMHG | RESPIRATION RATE: 18 BRPM | WEIGHT: 184.06 LBS

## 2023-01-19 DIAGNOSIS — R13.19 ESOPHAGEAL DYSPHAGIA: ICD-10-CM

## 2023-01-19 DIAGNOSIS — M33.13 DERMATOMYOSITIS: Primary | ICD-10-CM

## 2023-01-19 PROCEDURE — 96365 THER/PROPH/DIAG IV INF INIT: CPT

## 2023-01-19 PROCEDURE — 63600175 PHARM REV CODE 636 W HCPCS: Performed by: INTERNAL MEDICINE

## 2023-01-19 PROCEDURE — 96375 TX/PRO/DX INJ NEW DRUG ADDON: CPT

## 2023-01-19 PROCEDURE — 25000003 PHARM REV CODE 250: Performed by: INTERNAL MEDICINE

## 2023-01-19 PROCEDURE — 96366 THER/PROPH/DIAG IV INF ADDON: CPT

## 2023-01-19 RX ORDER — FAMOTIDINE 10 MG/ML
20 INJECTION INTRAVENOUS
Status: COMPLETED | OUTPATIENT
Start: 2023-01-19 | End: 2023-01-19

## 2023-01-19 RX ORDER — SODIUM CHLORIDE 0.9 % (FLUSH) 0.9 %
10 SYRINGE (ML) INJECTION
Status: CANCELLED | OUTPATIENT
Start: 2023-02-16

## 2023-01-19 RX ORDER — DIPHENHYDRAMINE HYDROCHLORIDE 50 MG/ML
50 INJECTION INTRAMUSCULAR; INTRAVENOUS
Status: CANCELLED
Start: 2023-02-16

## 2023-01-19 RX ORDER — DIPHENHYDRAMINE HYDROCHLORIDE 50 MG/ML
50 INJECTION INTRAMUSCULAR; INTRAVENOUS
Status: COMPLETED | OUTPATIENT
Start: 2023-01-19 | End: 2023-01-19

## 2023-01-19 RX ORDER — ACETAMINOPHEN 325 MG/1
650 TABLET ORAL
Status: COMPLETED | OUTPATIENT
Start: 2023-01-19 | End: 2023-01-19

## 2023-01-19 RX ORDER — ACETAMINOPHEN 325 MG/1
650 TABLET ORAL
Status: CANCELLED | OUTPATIENT
Start: 2023-02-16

## 2023-01-19 RX ORDER — SODIUM CHLORIDE 0.9 % (FLUSH) 0.9 %
10 SYRINGE (ML) INJECTION
Status: DISCONTINUED | OUTPATIENT
Start: 2023-01-19 | End: 2023-01-19 | Stop reason: HOSPADM

## 2023-01-19 RX ORDER — FAMOTIDINE 10 MG/ML
20 INJECTION INTRAVENOUS
Status: CANCELLED | OUTPATIENT
Start: 2023-02-16

## 2023-01-19 RX ORDER — HEPARIN 100 UNIT/ML
500 SYRINGE INTRAVENOUS
Status: CANCELLED | OUTPATIENT
Start: 2023-02-16

## 2023-01-19 RX ADMIN — ACETAMINOPHEN 650 MG: 325 TABLET ORAL at 08:01

## 2023-01-19 RX ADMIN — FAMOTIDINE 20 MG: 10 INJECTION, SOLUTION INTRAVENOUS at 08:01

## 2023-01-19 RX ADMIN — SODIUM CHLORIDE: 9 INJECTION, SOLUTION INTRAVENOUS at 09:01

## 2023-01-19 RX ADMIN — DIPHENHYDRAMINE HYDROCHLORIDE 50 MG: 50 INJECTION, SOLUTION INTRAMUSCULAR; INTRAVENOUS at 08:01

## 2023-01-19 RX ADMIN — HUMAN IMMUNOGLOBULIN G 85 G: 40 LIQUID INTRAVENOUS at 09:01

## 2023-01-19 NOTE — PROGRESS NOTES
Arrived for Privigen infusion. Tylenol 650 mg po, Pepcid 20 mg IVP, and Benadryl 50 IVP administered 30 minutes prior to infusion.  Privigen initiated at the following rates with titration every 30 minutes; 25ml/hr-50 ml/hr-100 ml/hr-200 ml/hr, NS  250cc bag @ 25ml/hr concurrently with Privigen. Tolerated without any difficulty.Left unit in NAD.

## 2023-01-20 ENCOUNTER — PATIENT MESSAGE (OUTPATIENT)
Dept: RHEUMATOLOGY | Facility: CLINIC | Age: 63
End: 2023-01-20
Payer: MEDICARE

## 2023-01-24 ENCOUNTER — SPECIALTY PHARMACY (OUTPATIENT)
Dept: PHARMACY | Facility: CLINIC | Age: 63
End: 2023-01-24
Payer: MEDICARE

## 2023-01-24 ENCOUNTER — PATIENT MESSAGE (OUTPATIENT)
Dept: RHEUMATOLOGY | Facility: CLINIC | Age: 63
End: 2023-01-24
Payer: MEDICARE

## 2023-01-24 RX ORDER — TOFACITINIB 5 MG/1
5 TABLET, FILM COATED ORAL 2 TIMES DAILY
Qty: 60 TABLET | Refills: 11 | Status: SHIPPED | OUTPATIENT
Start: 2023-01-24 | End: 2023-02-23

## 2023-01-24 NOTE — TELEPHONE ENCOUNTER
Ramona, this is Sydnee Briones with [facility].  We are working on your prescription that your doctor has sent us. We will be working with your insurance to get this approved for you. We will be calling you along the way with updates on your medication.  If you have any questions, you can reach us at [phone].  Welcome call outcome: Patient/caregiver reached.     Xeljanz rx received   -PA submitted if needed for PAP renewal.   -PA submitted CMM key: [id number]

## 2023-02-07 NOTE — TELEPHONE ENCOUNTER
Appeal denied.   Outgoing call to patient to inform and to check status of PAP renewal process.   PAP approved about a week ago per patient.   Through 12/31/2023.

## 2023-02-15 ENCOUNTER — INFUSION (OUTPATIENT)
Dept: INFECTIOUS DISEASES | Facility: HOSPITAL | Age: 63
End: 2023-02-15
Attending: INTERNAL MEDICINE
Payer: MEDICARE

## 2023-02-15 VITALS
OXYGEN SATURATION: 98 % | BODY MASS INDEX: 30.5 KG/M2 | TEMPERATURE: 98 F | HEART RATE: 112 BPM | DIASTOLIC BLOOD PRESSURE: 82 MMHG | WEIGHT: 183.31 LBS | SYSTOLIC BLOOD PRESSURE: 137 MMHG

## 2023-02-15 DIAGNOSIS — M33.13 DERMATOMYOSITIS: Primary | ICD-10-CM

## 2023-02-15 DIAGNOSIS — R13.19 ESOPHAGEAL DYSPHAGIA: ICD-10-CM

## 2023-02-15 PROCEDURE — 25000003 PHARM REV CODE 250: Performed by: INTERNAL MEDICINE

## 2023-02-15 PROCEDURE — 96366 THER/PROPH/DIAG IV INF ADDON: CPT

## 2023-02-15 PROCEDURE — 63600175 PHARM REV CODE 636 W HCPCS: Mod: JG | Performed by: INTERNAL MEDICINE

## 2023-02-15 PROCEDURE — 96365 THER/PROPH/DIAG IV INF INIT: CPT

## 2023-02-15 PROCEDURE — 96375 TX/PRO/DX INJ NEW DRUG ADDON: CPT

## 2023-02-15 RX ORDER — SODIUM CHLORIDE 0.9 % (FLUSH) 0.9 %
10 SYRINGE (ML) INJECTION
Status: CANCELLED | OUTPATIENT
Start: 2023-03-15

## 2023-02-15 RX ORDER — HEPARIN 100 UNIT/ML
500 SYRINGE INTRAVENOUS
Status: CANCELLED | OUTPATIENT
Start: 2023-03-15

## 2023-02-15 RX ORDER — ACETAMINOPHEN 325 MG/1
650 TABLET ORAL
Status: COMPLETED | OUTPATIENT
Start: 2023-02-15 | End: 2023-02-15

## 2023-02-15 RX ORDER — HEPARIN 100 UNIT/ML
500 SYRINGE INTRAVENOUS
Status: DISCONTINUED | OUTPATIENT
Start: 2023-02-15 | End: 2023-02-15 | Stop reason: HOSPADM

## 2023-02-15 RX ORDER — DIPHENHYDRAMINE HYDROCHLORIDE 50 MG/ML
50 INJECTION INTRAMUSCULAR; INTRAVENOUS
Status: CANCELLED
Start: 2023-03-15

## 2023-02-15 RX ORDER — FAMOTIDINE 10 MG/ML
20 INJECTION INTRAVENOUS
Status: COMPLETED | OUTPATIENT
Start: 2023-02-15 | End: 2023-02-15

## 2023-02-15 RX ORDER — SODIUM CHLORIDE 0.9 % (FLUSH) 0.9 %
10 SYRINGE (ML) INJECTION
Status: DISCONTINUED | OUTPATIENT
Start: 2023-02-15 | End: 2023-02-15 | Stop reason: HOSPADM

## 2023-02-15 RX ORDER — FAMOTIDINE 10 MG/ML
20 INJECTION INTRAVENOUS
Status: CANCELLED | OUTPATIENT
Start: 2023-03-15

## 2023-02-15 RX ORDER — DIPHENHYDRAMINE HYDROCHLORIDE 50 MG/ML
50 INJECTION INTRAMUSCULAR; INTRAVENOUS
Status: COMPLETED | OUTPATIENT
Start: 2023-02-15 | End: 2023-02-15

## 2023-02-15 RX ORDER — ACETAMINOPHEN 325 MG/1
650 TABLET ORAL
Status: CANCELLED | OUTPATIENT
Start: 2023-03-15

## 2023-02-15 RX ADMIN — ACETAMINOPHEN 650 MG: 325 TABLET ORAL at 08:02

## 2023-02-15 RX ADMIN — HUMAN IMMUNOGLOBULIN G 85 G: 40 LIQUID INTRAVENOUS at 09:02

## 2023-02-15 RX ADMIN — SODIUM CHLORIDE: 9 INJECTION, SOLUTION INTRAVENOUS at 08:02

## 2023-02-15 RX ADMIN — FAMOTIDINE 20 MG: 10 INJECTION, SOLUTION INTRAVENOUS at 08:02

## 2023-02-15 RX ADMIN — DIPHENHYDRAMINE HYDROCHLORIDE 50 MG: 50 INJECTION, SOLUTION INTRAMUSCULAR; INTRAVENOUS at 08:02

## 2023-02-15 NOTE — PROGRESS NOTES
Arrived for Privigen infusion. Tylenol 650 mg po, Pepcid 20 mg IVP, and Benadryl 50 IVP administered 30 minutes prior to infusion.  Privigen initiated at the following rates with titration every 30 minutes; 24ml/hr-49 ml/hr-99 ml/hr-199 ml/hr, NS  250cc bag @ 25ml/hr concurrently with Privigen. Tolerated without any difficulty.Left unit in NAD.

## 2023-03-10 ENCOUNTER — LAB VISIT (OUTPATIENT)
Dept: LAB | Facility: HOSPITAL | Age: 63
End: 2023-03-10
Attending: INTERNAL MEDICINE
Payer: MEDICARE

## 2023-03-10 ENCOUNTER — OFFICE VISIT (OUTPATIENT)
Dept: PRIMARY CARE CLINIC | Facility: CLINIC | Age: 63
End: 2023-03-10
Payer: MEDICARE

## 2023-03-10 VITALS
OXYGEN SATURATION: 100 % | HEART RATE: 88 BPM | SYSTOLIC BLOOD PRESSURE: 132 MMHG | BODY MASS INDEX: 29.87 KG/M2 | WEIGHT: 179.25 LBS | DIASTOLIC BLOOD PRESSURE: 80 MMHG | HEIGHT: 65 IN | TEMPERATURE: 98 F

## 2023-03-10 DIAGNOSIS — Q45.3 PANCREATIC ABNORMALITY: ICD-10-CM

## 2023-03-10 DIAGNOSIS — M33.13 DERMATOMYOSITIS: ICD-10-CM

## 2023-03-10 DIAGNOSIS — I70.0 AORTIC ATHEROSCLEROSIS: ICD-10-CM

## 2023-03-10 DIAGNOSIS — Z85.3 HISTORY OF BREAST CANCER: ICD-10-CM

## 2023-03-10 DIAGNOSIS — I10 BENIGN ESSENTIAL HYPERTENSION: ICD-10-CM

## 2023-03-10 DIAGNOSIS — E87.6 HYPOKALEMIA: ICD-10-CM

## 2023-03-10 DIAGNOSIS — E78.5 HYPERLIPIDEMIA, UNSPECIFIED HYPERLIPIDEMIA TYPE: ICD-10-CM

## 2023-03-10 DIAGNOSIS — R73.02 IGT (IMPAIRED GLUCOSE TOLERANCE): ICD-10-CM

## 2023-03-10 DIAGNOSIS — I10 BENIGN ESSENTIAL HYPERTENSION: Primary | ICD-10-CM

## 2023-03-10 DIAGNOSIS — N18.2 CKD (CHRONIC KIDNEY DISEASE) STAGE 2, GFR 60-89 ML/MIN: ICD-10-CM

## 2023-03-10 DIAGNOSIS — I73.9 PAD (PERIPHERAL ARTERY DISEASE): ICD-10-CM

## 2023-03-10 DIAGNOSIS — G62.0 CHEMOTHERAPY-INDUCED PERIPHERAL NEUROPATHY: ICD-10-CM

## 2023-03-10 DIAGNOSIS — D84.9 IMMUNOSUPPRESSION: ICD-10-CM

## 2023-03-10 DIAGNOSIS — T45.1X5A CHEMOTHERAPY-INDUCED PERIPHERAL NEUROPATHY: ICD-10-CM

## 2023-03-10 PROBLEM — C50.412 MALIGNANT NEOPLASM OF UPPER-OUTER QUADRANT OF LEFT BREAST IN FEMALE, ESTROGEN RECEPTOR NEGATIVE: Status: RESOLVED | Noted: 2017-02-09 | Resolved: 2023-03-10

## 2023-03-10 PROBLEM — Z17.1 MALIGNANT NEOPLASM OF UPPER-OUTER QUADRANT OF LEFT BREAST IN FEMALE, ESTROGEN RECEPTOR NEGATIVE: Status: RESOLVED | Noted: 2017-02-09 | Resolved: 2023-03-10

## 2023-03-10 LAB
ALBUMIN SERPL BCP-MCNC: 4.6 G/DL (ref 3.5–5.2)
ALP SERPL-CCNC: 108 U/L (ref 55–135)
ALT SERPL W/O P-5'-P-CCNC: 15 U/L (ref 10–44)
ANION GAP SERPL CALC-SCNC: 10 MMOL/L (ref 8–16)
AST SERPL-CCNC: 28 U/L (ref 10–40)
BILIRUB SERPL-MCNC: 0.4 MG/DL (ref 0.1–1)
BUN SERPL-MCNC: 12 MG/DL (ref 8–23)
CALCIUM SERPL-MCNC: 11.1 MG/DL (ref 8.7–10.5)
CHLORIDE SERPL-SCNC: 98 MMOL/L (ref 95–110)
CO2 SERPL-SCNC: 27 MMOL/L (ref 23–29)
CREAT SERPL-MCNC: 0.9 MG/DL (ref 0.5–1.4)
CREAT SERPL-MCNC: 0.9 MG/DL (ref 0.5–1.4)
EST. GFR  (NO RACE VARIABLE): >60 ML/MIN/1.73 M^2
EST. GFR  (NO RACE VARIABLE): >60 ML/MIN/1.73 M^2
ESTIMATED AVG GLUCOSE: 146 MG/DL (ref 68–131)
GLUCOSE SERPL-MCNC: 95 MG/DL (ref 70–110)
HBA1C MFR BLD: 6.7 % (ref 4–5.6)
MAGNESIUM SERPL-MCNC: 2.2 MG/DL (ref 1.6–2.6)
POTASSIUM SERPL-SCNC: 3.5 MMOL/L (ref 3.5–5.1)
PROT SERPL-MCNC: 11 G/DL (ref 6–8.4)
PTH-INTACT SERPL-MCNC: 72.1 PG/ML (ref 9–77)
SODIUM SERPL-SCNC: 135 MMOL/L (ref 136–145)

## 2023-03-10 PROCEDURE — 83735 ASSAY OF MAGNESIUM: CPT | Performed by: INTERNAL MEDICINE

## 2023-03-10 PROCEDURE — 36415 COLL VENOUS BLD VENIPUNCTURE: CPT | Mod: PN | Performed by: INTERNAL MEDICINE

## 2023-03-10 PROCEDURE — 99999 PR PBB SHADOW E&M-EST. PATIENT-LVL V: ICD-10-PCS | Mod: PBBFAC,,, | Performed by: INTERNAL MEDICINE

## 2023-03-10 PROCEDURE — 99214 PR OFFICE/OUTPT VISIT, EST, LEVL IV, 30-39 MIN: ICD-10-PCS | Mod: S$GLB,,, | Performed by: INTERNAL MEDICINE

## 2023-03-10 PROCEDURE — 3079F PR MOST RECENT DIASTOLIC BLOOD PRESSURE 80-89 MM HG: ICD-10-PCS | Mod: CPTII,S$GLB,, | Performed by: INTERNAL MEDICINE

## 2023-03-10 PROCEDURE — 3075F PR MOST RECENT SYSTOLIC BLOOD PRESS GE 130-139MM HG: ICD-10-PCS | Mod: CPTII,S$GLB,, | Performed by: INTERNAL MEDICINE

## 2023-03-10 PROCEDURE — 80053 COMPREHEN METABOLIC PANEL: CPT | Performed by: INTERNAL MEDICINE

## 2023-03-10 PROCEDURE — 83036 HEMOGLOBIN GLYCOSYLATED A1C: CPT | Performed by: INTERNAL MEDICINE

## 2023-03-10 PROCEDURE — 3079F DIAST BP 80-89 MM HG: CPT | Mod: CPTII,S$GLB,, | Performed by: INTERNAL MEDICINE

## 2023-03-10 PROCEDURE — 1160F RVW MEDS BY RX/DR IN RCRD: CPT | Mod: CPTII,S$GLB,, | Performed by: INTERNAL MEDICINE

## 2023-03-10 PROCEDURE — 83970 ASSAY OF PARATHORMONE: CPT | Performed by: INTERNAL MEDICINE

## 2023-03-10 PROCEDURE — 1160F PR REVIEW ALL MEDS BY PRESCRIBER/CLIN PHARMACIST DOCUMENTED: ICD-10-PCS | Mod: CPTII,S$GLB,, | Performed by: INTERNAL MEDICINE

## 2023-03-10 PROCEDURE — 1159F PR MEDICATION LIST DOCUMENTED IN MEDICAL RECORD: ICD-10-PCS | Mod: CPTII,S$GLB,, | Performed by: INTERNAL MEDICINE

## 2023-03-10 PROCEDURE — 99214 OFFICE O/P EST MOD 30 MIN: CPT | Mod: S$GLB,,, | Performed by: INTERNAL MEDICINE

## 2023-03-10 PROCEDURE — 3008F PR BODY MASS INDEX (BMI) DOCUMENTED: ICD-10-PCS | Mod: CPTII,S$GLB,, | Performed by: INTERNAL MEDICINE

## 2023-03-10 PROCEDURE — 99999 PR PBB SHADOW E&M-EST. PATIENT-LVL V: CPT | Mod: PBBFAC,,, | Performed by: INTERNAL MEDICINE

## 2023-03-10 PROCEDURE — 1159F MED LIST DOCD IN RCRD: CPT | Mod: CPTII,S$GLB,, | Performed by: INTERNAL MEDICINE

## 2023-03-10 PROCEDURE — 3075F SYST BP GE 130 - 139MM HG: CPT | Mod: CPTII,S$GLB,, | Performed by: INTERNAL MEDICINE

## 2023-03-10 PROCEDURE — 3008F BODY MASS INDEX DOCD: CPT | Mod: CPTII,S$GLB,, | Performed by: INTERNAL MEDICINE

## 2023-03-10 RX ORDER — ROSUVASTATIN CALCIUM 10 MG/1
10 TABLET, COATED ORAL DAILY
Qty: 90 TABLET | Refills: 3 | Status: SHIPPED | OUTPATIENT
Start: 2023-03-10 | End: 2023-05-15 | Stop reason: SDUPTHER

## 2023-03-10 NOTE — PROGRESS NOTES
Subjective:       Patient ID: Ermelinda Verde is a 62 y.o. female.    Chief Complaint: Hypertension    HPI  HTN - Chlorthalidone 25mg daily, procardia xl 30mg daily  K was mildly low at 3.4. needs repeat w/ Mg.  Checks BP at home sometimes. Usually 120s first thing in the morning and then 130s later at night.   No muscle cramps. When she yawns some pulling in the neck but otherwise no issues.   TTE 2/6/2020 -   Normal left ventricular systolic function. The estimated ejection fraction is 60%.  Normal LV diastolic function.  Normal right ventricular systolic function.  The estimated PA systolic pressure is 25 mmHg.  Normal central venous pressure (3 mmHg).    CKD2 - baseline Cr 0.7- 0.88 but most recentl Cr 1.15, eGFR 54 12/2022. Needs to repeat.   Normocytic anemia - Hgb 11.5 12/2022    HLD - crestor 10mg daily  CT A/P 5/2022 - VESSELS: Trace calcific aortoiliac atherosclerosis.  LDL - 7/20/22 59  No claudication.    Dermatomyositis - xeljanz 7.5mg daily and IVIG.   Follows w/ Dr. RAY LOV 9/13/22. F/u in 3 mo. Has appt 3/22/23.   CPK stable despite starting crestor 10.  , CRP, CPK, aldolase wnl 12/2022  Derm - pt reports f/u prn. Has all the creams that was previously rx.    L breast CA 2017 s/p chemo and L mastectomy.  Recurrent breast CA w/ mets to L supraclavicular LN s/p chemo 2018. S/p XRT 2019.   Chemo induced peripheral neuropathy. Numbness/tingling in the fingers and toes but that's not changed.   Follows w/ Dr. Reyna - LOV 12/5/22. F/u 6 mo.  Last R MMG 12/5/22 - neg.    4mm hypodensity pancreatic tail on CT A/P 5/24/22    Occasional R knee stiffness that improves after walking.    Review of Systems   Constitutional:  Negative for chills and fever.   HENT:  Positive for postnasal drip (spits out phlegm in the mornings.using astelin at night) and rhinorrhea. Negative for congestion and sinus pressure.    Eyes:  Negative for visual disturbance.   Respiratory:  Negative for cough, shortness of breath  "and wheezing.    Cardiovascular:  Negative for chest pain, palpitations and leg swelling.   Gastrointestinal:  Positive for constipation (started metamucil. doesn't drink a lot of water. dry mouth. doesn't exercise much.). Negative for abdominal pain, diarrhea, nausea and vomiting.   Endocrine: Negative for cold intolerance and heat intolerance.   Genitourinary:  Negative for dysuria and frequency.   Musculoskeletal:  Positive for arthralgias (tolerable joint pains) and myalgias (not bad. controlled on current regimen per pt.).   Skin:  Positive for color change (some pale areas on the knuckles. but otherwise all the redness has improved.). Negative for rash.   Allergic/Immunologic: Positive for environmental allergies.   Neurological:  Positive for numbness. Negative for dizziness, weakness, light-headedness and headaches.   Hematological:  Does not bruise/bleed easily.   Psychiatric/Behavioral:  Negative for dysphoric mood. The patient is not nervous/anxious.        Objective:      Physical Exam    /80   Pulse 88   Temp 97.8 °F (36.6 °C) (Oral)   Ht 5' 5" (1.651 m)   Wt 81.3 kg (179 lb 3.7 oz)   LMP  (LMP Unknown)   SpO2 100%   BMI 29.83 kg/m²     GEN - A+OX4, NAD   HEENT - PERRL, EOMI, OP clear. MMM.   Neck - No thyromegaly or cervical LAD. No thyroid masses felt.  CV - RRR, no m/r   Chest - CTAB, no wheezing or rhonchi  Abd - S/NT/ND/+BS.   Ext - 2+LDP, 1+ r dp and 2+ radial pulses. No LE edema.   Neuro - 5/5 BUE and BLE strength. Normal gait.   LN - No axillary LAD.   MSK - scoliosis. Normal gait.   Skin - hypopigmentation of the L chest where previous radiation treatment was. Some hypopigmentation of knuckles. Taut skin.    LABS REVIEWED W/ PT.    Assessment/Plan     Ermelinda was seen today for hypertension.    Diagnoses and all orders for this visit:    Benign essential hypertension - Stable and controlled. Continue current medications.  -     Comprehensive Metabolic Panel; Future    Hypokalemia - " likely due to chlorthalidone.  May start potassium supplement if still low.  -     Comprehensive Metabolic Panel; Future  -     Magnesium; Future    CKD (chronic kidney disease) stage 2, GFR 60-89 ml/min - kidney function mildly worsened in December.  Will recheck CMP today.  -     PTH, intact; Future    Hyperlipidemia, unspecified hyperlipidemia type  -     rosuvastatin (CRESTOR) 10 MG tablet; Take 1 tablet (10 mg total) by mouth once daily.  -     Comprehensive Metabolic Panel; Future    Aortic atherosclerosis  -     rosuvastatin (CRESTOR) 10 MG tablet; Take 1 tablet (10 mg total) by mouth once daily.  -     Comprehensive Metabolic Panel; Future    PAD (peripheral artery disease)  -     rosuvastatin (CRESTOR) 10 MG tablet; Take 1 tablet (10 mg total) by mouth once daily.  -     Comprehensive Metabolic Panel; Future    Dermatomyositis - continue Xeljanz and IVIG.  Follow-up with Rheumatology.  -     Comprehensive Metabolic Panel; Future    Immunosuppression - no acute infection.    Chemotherapy-induced peripheral neuropathy - stable.  Discussed good foot hygiene.    Pancreatic abnormality - 4mm pancreatic hypodensity on CT 2022. Repeat CT A/P pancreas protocol. H/o recurrent breast CA.   -     CT Abdomen Pelvis W Wo Contrast; Future  -     Creatinine, serum; Future    History of breast cancer  -     CT Abdomen Pelvis W Wo Contrast; Future  -     Creatinine, serum; Future    IGT (impaired glucose tolerance)  -     Hemoglobin A1C; Future    Discussed and given ACP to fill out.     Follow up in about 4 months (around 7/10/2023).      Reta Weeks MD  Department of Internal Medicine - [facility]  9:20 AM

## 2023-03-13 ENCOUNTER — TELEPHONE (OUTPATIENT)
Dept: PRIMARY CARE CLINIC | Facility: CLINIC | Age: 63
End: 2023-03-13
Payer: MEDICARE

## 2023-03-13 DIAGNOSIS — E11.9 NEWLY DIAGNOSED DIABETES: Primary | ICD-10-CM

## 2023-03-13 NOTE — TELEPHONE ENCOUNTER
Please call and notify pt:  Her kidney and liver functions are ok.   Calcium is high. Please see if she's taking any MVI/calcium supplements. If so, discontinue it altogether. Parathyroid hormone level is normal.   Previously her sugars were in the prediabetes range but when we just checked it, it is now in the diabetes range (cut off is 6.5 and hers is 6.7).   It's not bad enough for us to start medicine at this time but I want her to see the diabetic educator. Referral in. F/u in 2 instead of the original 4 months planned.

## 2023-03-14 NOTE — TELEPHONE ENCOUNTER
Alison Mcduffie Staff  Caller: pt [phone] (Today, 11:28 AM)  Patient is returning a phone call.   Who left a message for the patient: Nurse   Does patient know what this is regarding:  result   Would you like a call back, or a response through your [facility] portal?:   call   Comments:  Patient was reminded to check portal message

## 2023-03-14 NOTE — TELEPHONE ENCOUNTER
Pt updated. She will stop MVI. Appt made in May.   DM education already contacted pt.  Advised to cut white breads and white rice, complex brown colored carbs in moderation. Walking 30 min after eating.   She will ask  about letter he left with us. I let her know that Dr Weeks cannot legally state he has not had hx of diabetes, but can provide medical history if needed.   Let her know that I did put a message on his portal.

## 2023-03-15 ENCOUNTER — INFUSION (OUTPATIENT)
Dept: INFECTIOUS DISEASES | Facility: HOSPITAL | Age: 63
End: 2023-03-15
Attending: INTERNAL MEDICINE
Payer: MEDICARE

## 2023-03-15 VITALS
BODY MASS INDEX: 29.83 KG/M2 | RESPIRATION RATE: 18 BRPM | SYSTOLIC BLOOD PRESSURE: 145 MMHG | WEIGHT: 179.25 LBS | DIASTOLIC BLOOD PRESSURE: 76 MMHG | HEART RATE: 105 BPM | TEMPERATURE: 98 F | OXYGEN SATURATION: 100 %

## 2023-03-15 DIAGNOSIS — R13.19 ESOPHAGEAL DYSPHAGIA: ICD-10-CM

## 2023-03-15 DIAGNOSIS — M33.13 DERMATOMYOSITIS: Primary | ICD-10-CM

## 2023-03-15 PROCEDURE — 96375 TX/PRO/DX INJ NEW DRUG ADDON: CPT

## 2023-03-15 PROCEDURE — 25000003 PHARM REV CODE 250: Performed by: INTERNAL MEDICINE

## 2023-03-15 PROCEDURE — 96366 THER/PROPH/DIAG IV INF ADDON: CPT

## 2023-03-15 PROCEDURE — 96365 THER/PROPH/DIAG IV INF INIT: CPT

## 2023-03-15 PROCEDURE — 63600175 PHARM REV CODE 636 W HCPCS: Performed by: INTERNAL MEDICINE

## 2023-03-15 RX ORDER — HEPARIN 100 UNIT/ML
500 SYRINGE INTRAVENOUS
Status: CANCELLED | OUTPATIENT
Start: 2023-04-12

## 2023-03-15 RX ORDER — FAMOTIDINE 10 MG/ML
20 INJECTION INTRAVENOUS
Status: CANCELLED | OUTPATIENT
Start: 2023-04-12

## 2023-03-15 RX ORDER — DIPHENHYDRAMINE HYDROCHLORIDE 50 MG/ML
50 INJECTION INTRAMUSCULAR; INTRAVENOUS
Status: CANCELLED
Start: 2023-04-12

## 2023-03-15 RX ORDER — SODIUM CHLORIDE 0.9 % (FLUSH) 0.9 %
10 SYRINGE (ML) INJECTION
Status: CANCELLED | OUTPATIENT
Start: 2023-04-12

## 2023-03-15 RX ORDER — FAMOTIDINE 10 MG/ML
20 INJECTION INTRAVENOUS
Status: COMPLETED | OUTPATIENT
Start: 2023-03-15 | End: 2023-03-15

## 2023-03-15 RX ORDER — DIPHENHYDRAMINE HYDROCHLORIDE 50 MG/ML
50 INJECTION INTRAMUSCULAR; INTRAVENOUS
Status: COMPLETED | OUTPATIENT
Start: 2023-03-15 | End: 2023-03-15

## 2023-03-15 RX ORDER — ACETAMINOPHEN 325 MG/1
650 TABLET ORAL
Status: CANCELLED | OUTPATIENT
Start: 2023-04-12

## 2023-03-15 RX ORDER — ACETAMINOPHEN 325 MG/1
650 TABLET ORAL
Status: COMPLETED | OUTPATIENT
Start: 2023-03-15 | End: 2023-03-15

## 2023-03-15 RX ORDER — SODIUM CHLORIDE 0.9 % (FLUSH) 0.9 %
10 SYRINGE (ML) INJECTION
Status: DISCONTINUED | OUTPATIENT
Start: 2023-03-15 | End: 2023-03-15 | Stop reason: HOSPADM

## 2023-03-15 RX ADMIN — HUMAN IMMUNOGLOBULIN G 80 G: 40 LIQUID INTRAVENOUS at 09:03

## 2023-03-15 RX ADMIN — SODIUM CHLORIDE: 9 INJECTION, SOLUTION INTRAVENOUS at 08:03

## 2023-03-15 RX ADMIN — ACETAMINOPHEN 650 MG: 325 TABLET ORAL at 08:03

## 2023-03-15 RX ADMIN — FAMOTIDINE 20 MG: 10 INJECTION, SOLUTION INTRAVENOUS at 08:03

## 2023-03-15 RX ADMIN — DIPHENHYDRAMINE HYDROCHLORIDE 50 MG: 50 INJECTION INTRAMUSCULAR; INTRAVENOUS at 08:03

## 2023-03-15 NOTE — PROGRESS NOTES
Arrived for Privigen infusion. Tylenol 650 mg po, Pepcid 20 mg IVP, and Benadryl 50 IVP administered 30 minutes prior to infusion.  Privigen initiated at the following rates with titration every 30 minutes; 24ml/hr-48 ml/hr-97 ml/hr-195 ml/hr, NS  250cc bag @ 25ml/hr concurrently with Privigen. Tolerated without any difficulty.Left unit in NAD.

## 2023-03-22 ENCOUNTER — PATIENT MESSAGE (OUTPATIENT)
Dept: RHEUMATOLOGY | Facility: CLINIC | Age: 63
End: 2023-03-22

## 2023-03-22 ENCOUNTER — OFFICE VISIT (OUTPATIENT)
Dept: RHEUMATOLOGY | Facility: CLINIC | Age: 63
End: 2023-03-22
Payer: MEDICARE

## 2023-03-22 ENCOUNTER — LAB VISIT (OUTPATIENT)
Dept: LAB | Facility: HOSPITAL | Age: 63
End: 2023-03-22
Attending: INTERNAL MEDICINE
Payer: MEDICARE

## 2023-03-22 VITALS
HEIGHT: 65 IN | BODY MASS INDEX: 30.49 KG/M2 | SYSTOLIC BLOOD PRESSURE: 148 MMHG | DIASTOLIC BLOOD PRESSURE: 79 MMHG | HEART RATE: 97 BPM | WEIGHT: 183 LBS

## 2023-03-22 DIAGNOSIS — M33.13 DERMATOMYOSITIS: Primary | ICD-10-CM

## 2023-03-22 DIAGNOSIS — M33.13 DERMATOMYOSITIS: ICD-10-CM

## 2023-03-22 LAB
BASOPHILS # BLD AUTO: 0.04 K/UL (ref 0–0.2)
BASOPHILS NFR BLD: 0.7 % (ref 0–1.9)
CK SERPL-CCNC: 242 U/L (ref 20–180)
CRP SERPL-MCNC: 12 MG/L (ref 0–8.2)
DIFFERENTIAL METHOD: ABNORMAL
EOSINOPHIL # BLD AUTO: 0.1 K/UL (ref 0–0.5)
EOSINOPHIL NFR BLD: 2.3 % (ref 0–8)
ERYTHROCYTE [DISTWIDTH] IN BLOOD BY AUTOMATED COUNT: 15.4 % (ref 11.5–14.5)
ERYTHROCYTE [SEDIMENTATION RATE] IN BLOOD BY PHOTOMETRIC METHOD: 77 MM/HR (ref 0–36)
HCT VFR BLD AUTO: 36.6 % (ref 37–48.5)
HGB BLD-MCNC: 11.2 G/DL (ref 12–16)
IMM GRANULOCYTES # BLD AUTO: 0 K/UL (ref 0–0.04)
IMM GRANULOCYTES NFR BLD AUTO: 0 % (ref 0–0.5)
LYMPHOCYTES # BLD AUTO: 2.5 K/UL (ref 1–4.8)
LYMPHOCYTES NFR BLD: 44.9 % (ref 18–48)
MCH RBC QN AUTO: 26.5 PG (ref 27–31)
MCHC RBC AUTO-ENTMCNC: 30.6 G/DL (ref 32–36)
MCV RBC AUTO: 87 FL (ref 82–98)
MONOCYTES # BLD AUTO: 0.7 K/UL (ref 0.3–1)
MONOCYTES NFR BLD: 12.9 % (ref 4–15)
NEUTROPHILS # BLD AUTO: 2.2 K/UL (ref 1.8–7.7)
NEUTROPHILS NFR BLD: 39.2 % (ref 38–73)
NRBC BLD-RTO: 0 /100 WBC
PLATELET # BLD AUTO: 246 K/UL (ref 150–450)
PMV BLD AUTO: 8.9 FL (ref 9.2–12.9)
RBC # BLD AUTO: 4.23 M/UL (ref 4–5.4)
WBC # BLD AUTO: 5.57 K/UL (ref 3.9–12.7)

## 2023-03-22 PROCEDURE — 3044F PR MOST RECENT HEMOGLOBIN A1C LEVEL <7.0%: ICD-10-PCS | Mod: CPTII,S$GLB,, | Performed by: INTERNAL MEDICINE

## 2023-03-22 PROCEDURE — 99999 PR PBB SHADOW E&M-EST. PATIENT-LVL III: ICD-10-PCS | Mod: PBBFAC,,, | Performed by: INTERNAL MEDICINE

## 2023-03-22 PROCEDURE — 3008F BODY MASS INDEX DOCD: CPT | Mod: CPTII,S$GLB,, | Performed by: INTERNAL MEDICINE

## 2023-03-22 PROCEDURE — 1159F MED LIST DOCD IN RCRD: CPT | Mod: CPTII,S$GLB,, | Performed by: INTERNAL MEDICINE

## 2023-03-22 PROCEDURE — 85652 RBC SED RATE AUTOMATED: CPT | Performed by: INTERNAL MEDICINE

## 2023-03-22 PROCEDURE — 86140 C-REACTIVE PROTEIN: CPT | Performed by: INTERNAL MEDICINE

## 2023-03-22 PROCEDURE — 99214 PR OFFICE/OUTPT VISIT, EST, LEVL IV, 30-39 MIN: ICD-10-PCS | Mod: S$GLB,,, | Performed by: INTERNAL MEDICINE

## 2023-03-22 PROCEDURE — 3078F PR MOST RECENT DIASTOLIC BLOOD PRESSURE < 80 MM HG: ICD-10-PCS | Mod: CPTII,S$GLB,, | Performed by: INTERNAL MEDICINE

## 2023-03-22 PROCEDURE — 3077F SYST BP >= 140 MM HG: CPT | Mod: CPTII,S$GLB,, | Performed by: INTERNAL MEDICINE

## 2023-03-22 PROCEDURE — 3078F DIAST BP <80 MM HG: CPT | Mod: CPTII,S$GLB,, | Performed by: INTERNAL MEDICINE

## 2023-03-22 PROCEDURE — 85025 COMPLETE CBC W/AUTO DIFF WBC: CPT | Performed by: INTERNAL MEDICINE

## 2023-03-22 PROCEDURE — 3008F PR BODY MASS INDEX (BMI) DOCUMENTED: ICD-10-PCS | Mod: CPTII,S$GLB,, | Performed by: INTERNAL MEDICINE

## 2023-03-22 PROCEDURE — 36415 COLL VENOUS BLD VENIPUNCTURE: CPT | Performed by: INTERNAL MEDICINE

## 2023-03-22 PROCEDURE — 1159F PR MEDICATION LIST DOCUMENTED IN MEDICAL RECORD: ICD-10-PCS | Mod: CPTII,S$GLB,, | Performed by: INTERNAL MEDICINE

## 2023-03-22 PROCEDURE — 82085 ASSAY OF ALDOLASE: CPT | Performed by: INTERNAL MEDICINE

## 2023-03-22 PROCEDURE — 99999 PR PBB SHADOW E&M-EST. PATIENT-LVL III: CPT | Mod: PBBFAC,,, | Performed by: INTERNAL MEDICINE

## 2023-03-22 PROCEDURE — 1160F RVW MEDS BY RX/DR IN RCRD: CPT | Mod: CPTII,S$GLB,, | Performed by: INTERNAL MEDICINE

## 2023-03-22 PROCEDURE — 1160F PR REVIEW ALL MEDS BY PRESCRIBER/CLIN PHARMACIST DOCUMENTED: ICD-10-PCS | Mod: CPTII,S$GLB,, | Performed by: INTERNAL MEDICINE

## 2023-03-22 PROCEDURE — 82550 ASSAY OF CK (CPK): CPT | Performed by: INTERNAL MEDICINE

## 2023-03-22 PROCEDURE — 3044F HG A1C LEVEL LT 7.0%: CPT | Mod: CPTII,S$GLB,, | Performed by: INTERNAL MEDICINE

## 2023-03-22 PROCEDURE — 3077F PR MOST RECENT SYSTOLIC BLOOD PRESSURE >= 140 MM HG: ICD-10-PCS | Mod: CPTII,S$GLB,, | Performed by: INTERNAL MEDICINE

## 2023-03-22 PROCEDURE — 99214 OFFICE O/P EST MOD 30 MIN: CPT | Mod: S$GLB,,, | Performed by: INTERNAL MEDICINE

## 2023-03-22 RX ORDER — FAMOTIDINE 10 MG/ML
20 INJECTION INTRAVENOUS
Status: CANCELLED | OUTPATIENT
Start: 2023-04-12

## 2023-03-22 RX ORDER — ACETAMINOPHEN 325 MG/1
650 TABLET ORAL
Status: CANCELLED | OUTPATIENT
Start: 2023-04-12

## 2023-03-22 RX ORDER — SODIUM CHLORIDE 0.9 % (FLUSH) 0.9 %
10 SYRINGE (ML) INJECTION
Status: CANCELLED | OUTPATIENT
Start: 2023-04-12

## 2023-03-22 RX ORDER — DIPHENHYDRAMINE HYDROCHLORIDE 50 MG/ML
50 INJECTION INTRAMUSCULAR; INTRAVENOUS
Status: CANCELLED
Start: 2023-04-12

## 2023-03-22 RX ORDER — HEPARIN 100 UNIT/ML
500 SYRINGE INTRAVENOUS
Status: CANCELLED | OUTPATIENT
Start: 2023-04-12

## 2023-03-22 NOTE — PROGRESS NOTES
Rapid3 Question Responses and Scores 3/21/2023   MDHAQ Score 0.3   Psychologic Score 3.3   Pain Score 1.5   When you awakened in the morning OVER THE LAST WEEK, did you feel stiff? Yes   If Yes, please indicate the number of hours until you are as limber as you will be for the day 1   Fatigue Score 1   Global Health Score 2   RAPID3 Score 1.5       Answers submitted by the patient for this visit:  Rheumatology Questionnaire (Submitted on 3/21/2023)  fever: No  eye redness: No  mouth sores: No  headaches: No  shortness of breath: No  chest pain: No  trouble swallowing: No  diarrhea: No  constipation: Yes  unexpected weight change: No  genital sore: No  dysuria: No  During the last 3 days, have you had a skin rash?: No  Bruises or bleeds easily: No  cough: No

## 2023-03-22 NOTE — PROGRESS NOTES
RHEUMATOLOGY CLINIC FOLLOW UP VISIT  Chief complaints:-  To follow up for Myositis follow up     HPI:-  Ermelinda Javed a 62 y.o. pleasant female  with history of L sided infiltrating ductal carcinoma of the L breast (dx on Jan 2017), HTN, depression, gastritis, and recently diagnosed myositis (uncertain if it's autoimmune vs medication induced), present today with concern about myositis flare     Patient was initially send from hem/onc clinic for evaluation of possible inflammatory myositis.  Patient was hospitalized from 1/22/19 till 2/13/19 for myositis.      L breast cancer s/p completion of 4 cycles of demi-adjuvant taxotere and cytoxan (completed on 5/9/17) and mastectomy (6/26/17) and then 4 cycles of adjuvant Adriamycin (completed 9/12/17). In 10/2017 - patient developed L supraclavicular lymphadenopathy which FNA confirmed as reoccurrence of previously treated breast cancer.      Patient started on Atelizumab/Abraxane on 11/7/18. Per chart review, patient noticed rashes on the dorsum of her hands on 11/11/18. Seen in urgent care and given topical steroid cream. Then received two more infusions on Atelizumab/Abraxane on 11/21/18 and 12/5/18. Started to notice puffiness around the eyes on 12/11/18. Received another Abraxane infusion on 12/12/18 but this time with hydrocortisone 50 mg which helped reduce the swelling around the eyes. Developed swelling of the face again on 12/15/18. Next infusion of Abraxane done on 12/19/18 with Solucortef and Atelizumab held. Abraxane given again on 1/3/19 with no IV steroid. Patient developed swelling of the face on 1/4/19 and went to urgent care and given short course of prednisone 20 mg BID. On 1/15/19, patient seen in hem/onc clinic with c/o of pressure and tightness around neck. CT scan of chest and neck did not reveal any vascular compression. Started on prednisone 60 mg with taper. On 1/22/18 patient with c/o  proximal muscle weakness. CPK found to be elevated around 4k. Patient admitted and given solumedrol 80 mg IV on 1/22/18. Last infusion of Atelizumab on 12/19/18. Last infusion of Abraxane on 1/3/19. Given Solumedrol 1g x 1 on 1/23/18. Seen by Dermatology on 1/24/18 and biopsy done of skin rash. Given distribution of rashes, there was concern for dermatomyositis. Patient started on Solumedrol 125 mg IV BID at this time.      During her hospitalization patient was started on high dose steroid Solumedrol 80mg IV x 1, 1g x 1, and then 125mg BID until tapered down to medrol 48mg.  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  Patient was started on IVIG (400mg/kg/daily x 5 day) 1/29/19-2/2.   Patient started on MTX 20mg SQ (Feb 5 2019) with folic acid. MTX SQ was transitioned to PO because concern about rehab administration.  Patient failed swallow eval and PEG tube was placed.        Denies any family history of autoimmune diseases.     No smoking, EtOH, recreational drug usage.     Denies any photosensitivity, joint swelling, unintentional weigh loss, abdominal pain, night sweats, CP, SOB.  +oral thrush.      Completed rehab at [facility].  Completed IVIG (2/28 and 3/1).  Had mild HA after infusion.  Doing well.  A lot stronger - able to do household chores since discharge.  Not back at baseline yet ~80%.  Mild weakness with increased activities.  Able to ambulate without assistance (but is still a fall precaution).  Tolerating diet via PEG tube.  Completed rehab.      MTX discontinued 2/18 with concern of toxicity (decrease blood counts and transaminatis).  Folic acid increased to 4mg daily.  Still on medrol 32mg daily with atorvaquone for PCP prophylaxis.  Bactrim d/c because of interaction with leucovorin.  Leucovorin 5mg daily started - Leucovorin discontinued.  Continues to be on folic acid 4mg daily     Radiation completed (28 days) completed May 8.       EGD/colonoscopy done on July 29 - normal.  PEG  tube removed     Last PET scan (June 20) showed negative for metastasis.  At this time, will monitor per oncology and repeat PET scan in Sept.  No treatment needed at this time.      Rash was biopsied and evaluated by dermatology.   Biopsy report conclusive of dermatomyositis.  Was given topical steroid which provided minimal alleviation of symptoms.  +paco      6/2020    Completed Rituxan on 4/27 (1g x 2).  Had a flare after infusion requiring increase of steroid.  Since then patient had been doing well - improving of myalgia and rash.  Still having mild edema and generalized weakness (especially in the afternoon/evening).       Patient was started on HCQ - compliant on all home medications.  Continues to work out daily.  Finds most difficulty with getting in and out of the car.   Denies any dysphagia, alopecia, arthralgia, abdominal pain, CP, SOB, N/V, chills, or urinary symptoms.      7/2020    Rash all over her body  Red rash on the left leg  Face once again has erythema and heliotrope rash  Prednisone down to 10 mg and looks like she has a flare again  On plaquenil 200 mg bid  Wears sunscreen  Avoids the direct sun    Most recent labs     Ck nml  Aldolase nml  ESR 53  CRP nml  Mild anemia  AST 67      9/2020    She started xeljanz 5 mg bid mid august  She feels well  Swelling is better  Her weakness and fatigue is all gone  Face still looks red   Chest is all better and upper arm is great  Itch and rash seems better   Rash on the legs is gone     CK,aldolase nml  CRP nml   now   AST 61  GFR nml  ALT nml    plts 145 but stable  H/h 11.6/35.3    But the white count has dropped to 2.70    It has been low on and off since 2019 so this is not new     She has a sinus issue going on   She has greenish d/c  She has a tinge of blood       10/12/2020  We cut the xeljanz dose to 5 mg daily  No more swelling  Face looks clear,not much redness  The eyelid is not puffy and not intensely erythematous   Along the  nasal folds there is some redness but again not intense  On the MCPs and Dorsal hands she has some erythema which is more pinkish  Not itchy  Sometimes these pink areas can turn pigmented  Now complaint to sunscreen      White count has improved from 2.72 to 2.80  H/h 11.3/34.7  plts nml  ANC has improved from 0.6 to 1.4  Lymphocyte count 0.9    2/2021    She has fatigue if she has to do a lot of things  Facial rash is stable  On the hands : redness is stable  Sometimes left ankle and left knee is swollen,painful/tight   Not muscular weakness but she feels skinny  BMI is 29.39 though    White count 4.30  H/H 11.6/35.4  plts 223  Lymphocytes nml    AST went upto 52  Rest of LFTs ok  GFR nml  ESR > 120  CRP nml  CK,aldolase nml    2/2021 we made her xeljanz 7.5 mg daily  Gets IVIG     4/2021  Dermatology said    Patient appears to have 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 No need for addition of systemic steroids with controlled myopathy and otherwise absence of systemic disease due to lack of efficacy in controlling cutaneous disease, side effect profile.   Currently with evidence of both nonvasculopathic cutaneous disease (heliotrophe, facial rash, Gottron's papules) as well as a degree vasculopathic disease (nailfold vascular changes, evidence digital pulp ulcers/lesions, associated pain at these locations). Overall patient doing well, states QOL not greatly affected by residual rashes.      Considerations for comorbid rosacea/seb derm at face (micropustules on exam today; some scaling at brows, nasal creases noted).      Recommendations:  - Strict sun protection measures always.   - Optimization of topicals as above: Elidel mixed 50/50 with ketoconazole cream BID PRN rash at face; Plexion wash daily at face; transition to Diprolene cream for rash at hands.   - Offered ILK to rash at hands, patient again declined.   - Consideration for  addition of vasodilatory agent (e.g. nifedipine) for relief in associated pain at fingertip lesions.   - Otherwise c/w rheumatologic management with IVIG, tofacitinib.      Labs 4/2021    H/h 11.3/36.2  nml white count 5.27  plts nml    5/2021    Patient would like to consider steroid/intralesional kenalog injections  She had stopped xeljanz on 4/28 for the covid vaccine   White count was done 2 days after 4/28 which is on 4/30 and the count was 5.27  She got covid vaccine on 3/29   Stopped xeljanz till 4/6  Did the blood work on 4/8 and was on xeljanz at that time  So her white count was 3.80  Prior to that she was 4.30 and 5.19    CMP nml    10/2021    She is doing really well  She has not needed steroid creams or injections  Skin looks really good  She is not puffy,she is not weak    Left foot has a stabbing pain after she walks around for some time  Some aches and pains    8/2021    CRP 15.8  ESR > 130  She had sinus issues at this time  Ck,aldolase nml  AST still 51  ALT nml  GFR nml  H/h 11.6/34.7  nml white count,4.90  nml plts  GGT nml  Vit d nml    1/2022    She is doing really really well  No more red and pink rashes  No more edema  On xeljanz 5 mg daily/was on 7.5 mg daily   Insurance is denying-so appeal     Labs     Ck,aldolase nml  CRP nml  ESR 67  CMP nml  CBC nml      5/2022    She took xeljanz 5 mg daily for 2 to 3 months and then didn't have medication for a month- thankfully has not flared in the skin and muscles  Today's labs are pending   IVIG - is still monthly     9/2022      On xeljanz 5 mg daily  She feels great  IVIG is monthly    Right hand -looks a little flared - this is her driving hand     Aldolase nml  -Stable  CRP nml  ESR Down to 67,it was > 120  CMP- AST upto 50  ALT nml  CBC - H/H 11.5/34.1,Nml plts,white count       12/2022    H/h 11.5/35  Nml white and plt count  Ck 238  Aldolase nml      3/2023    Calcium 11.1  She was taking 2 calciums a day  Protein is also high  11  Creat nml  AST down to 28  ALT nml    3/2023    IVIG is 1 g/kg monthly since may 2022  Xeljanz 5 mg daily - since sep 2022    Skin-doing great    For 6 months she has done half the dose and she has still done well    Review of Systems   Constitutional:  Negative for chills, diaphoresis, fever, malaise/fatigue and weight loss.   HENT:  Negative for congestion, ear discharge, ear pain, hearing loss, nosebleeds, sinus pain and tinnitus.    Eyes:  Positive for discharge. Negative for photophobia, pain and redness.   Respiratory:  Negative for cough, hemoptysis, sputum production, shortness of breath, wheezing and stridor.    Cardiovascular:  Negative for chest pain, palpitations, orthopnea, claudication, leg swelling and PND.   Gastrointestinal:  Positive for constipation. Negative for abdominal pain, diarrhea, heartburn, nausea and vomiting.   Genitourinary:  Negative for dysuria, frequency, hematuria and urgency.   Musculoskeletal:  Positive for myalgias. Negative for back pain, joint pain and neck pain.   Skin:  Positive for rash.   Neurological:  Negative for dizziness, tingling, tremors, weakness and headaches.   Endo/Heme/Allergies:  Does not bruise/bleed easily.   Psychiatric/Behavioral:  Negative for depression, hallucinations and suicidal ideas. The patient is not nervous/anxious and does not have insomnia.      Past Medical History:   Diagnosis Date    Anemia 2019    Anxiety 2018    Breast cancer 2017    left    Depression     Dermatomyositis     Diverticulosis     Erosive esophagitis     Gastritis     Hepatic cyst     Hypertension     Immune disorder 2019    Joint pain 2019    Keloid cicatrix 2005    Thyroiditis     Vitamin B12 deficiency 3/8/2018       Past Surgical History:   Procedure Laterality Date    BREAST BIOPSY Left     BREAST RECONSTRUCTION Left 2017     SECTION      COLONOSCOPY  2011    repeat in 10 yrs.    COLONOSCOPY N/A 2019    Procedure: COLONOSCOPY;  Surgeon: Pernell  MD Ralph;  Location: [facility] (4TH FLR);  Service: Endoscopy;  Laterality: N/A;  Patient would like to use her PEG tube for bowel prep and then have her PEG tube removed after EGD and colonoscopy procedures while she is still sedated.    D&C      ESOPHAGOGASTRODUODENOSCOPY N/A 1/29/2019    Procedure: EGD (ESOPHAGOGASTRODUODENOSCOPY);  Surgeon: Pernell Rosas MD;  Location: [facility] (2ND FLR);  Service: Endoscopy;  Laterality: N/A;    ESOPHAGOGASTRODUODENOSCOPY N/A 7/29/2019    Procedure: EGD (ESOPHAGOGASTRODUODENOSCOPY);  Surgeon: Pernell Rosas MD;  Location: [facility] (4TH FLR);  Service: Endoscopy;  Laterality: N/A;  EGD for follow-up of erosive esophagitis per Dr. Rosas    Patient would like to use her PEG tube for bowel prep and then have her PEG tube removed after EGD and colonoscopy procedures while she is still sedated.    INSERTION OF TUNNELED CENTRAL VENOUS CATHETER (CVC) WITH SUBCUTANEOUS PORT Right 10/31/2018    Procedure: [id number];  Surgeon: Deo Palencia MD;  [address]  Service: General;  Laterality: Right;    MASTECTOMY Left 06/26/2017    MYOMECTOMY      PORTACATH PLACEMENT      SENTINEL LYMPH NODE BIOPSY  02/2017    left    SKIN BIOPSY  2019    TOTAL REDUCTION MAMMOPLASTY Right 2017    UPPER GASTROINTESTINAL ENDOSCOPY          Social History     Tobacco Use    Smoking status: Never    Smokeless tobacco: Never   Substance Use Topics    Alcohol use: Not Currently    Drug use: No       Family History   Problem Relation Age of Onset    Hypertension Mother     Heart disease Father     Hypertension Father     Breast cancer Sister 52    No Known Problems Sister     No Known Problems Brother     No Known Problems Brother     No Known Problems Brother     Thyroid disease Daughter         hyperthyroidism s/p thyroidectomy    No Known Problems Daughter     No Known Problems Son     Colon cancer Neg Hx     Ovarian cancer Neg Hx     Celiac disease Neg Hx     Cirrhosis Neg Hx     Colon  "polyps Neg Hx     Esophageal cancer Neg Hx     Inflammatory bowel disease Neg Hx     Liver cancer Neg Hx     Liver disease Neg Hx     Rectal cancer Neg Hx     Stomach cancer Neg Hx     Ulcerative colitis Neg Hx     Melanoma Neg Hx        Review of patient's allergies indicates:  No Known Allergies    Vitals:    03/22/23 1029   BP: (!) 148/79   Pulse: 97   Weight: 83 kg (182 lb 15.7 oz)   Height: 5' 5" (1.651 m)       Physical Exam  HENT:      Head: Normocephalic and atraumatic.   Eyes:      Pupils: Pupils are equal, round, and reactive to light.   Cardiovascular:      Rate and Rhythm: Normal rate and regular rhythm.      Heart sounds: Normal heart sounds.   Pulmonary:      Effort: Pulmonary effort is normal.      Breath sounds: Normal breath sounds.   Abdominal:      General: Bowel sounds are normal.      Palpations: Abdomen is soft.   Musculoskeletal:         General: Normal range of motion.      Cervical back: Normal range of motion and neck supple.   Skin:     General: Skin is warm and dry.      Findings: No erythema or rash.      Comments: Right hand - rash is back again,left hand not so prominent     Neurological:      Mental Status: She is alert and oriented to person, place, and time.      Gait: Gait is intact.   Psychiatric:         Mood and Affect: Mood and affect normal.         Cognition and Memory: Memory normal.         Judgment: Judgment normal.     Digital pitting +    Labs:  Results for ROMEO PEREZ (MRN [id number]) as of 6/17/2020 10:12   Ref. Range 4/22/2020 11:51 4/24/2020 11:43 5/25/2020 09:34 6/10/2020 11:25 6/10/2020 11:26   WBC Latest Ref Range: 3.90 - 12.70 K/uL 4.30  3.60 (L) 4.60    RBC Latest Ref Range: 4.00 - 5.40 M/uL 4.49  4.52 4.47    Hemoglobin Latest Ref Range: 12.0 - 16.0 g/dL 12.1  12.5 12.4    Hematocrit Latest Ref Range: 37.0 - 48.5 % 38.2  38.7 38.4    MCV Latest Ref Range: 82 - 98 fL 85  86 86    MCH Latest Ref Range: 27.0 - 31.0 pg 27.0  27.7 27.8    MCHC Latest Ref Range: " 32.0 - 36.0 g/dL 31.8 (L)  32.4 32.4    RDW Latest Ref Range: 11.5 - 14.5 % 16.0 (H)  17.2 (H) 17.3 (H)    Platelets Latest Ref Range: 150 - 350 K/uL 157  206 164    MPV Latest Ref Range: 7.4 - 10.4 fL 7.3 (L)  7.0 (L) 7.2 (L)    Gran% Latest Ref Range: 38.0 - 73.0 % 66.0  62.8 66.2    Gran # (ANC) Latest Ref Range: 1.8 - 7.7 K/uL 2.8  2.2 3.0    Lymph% Latest Ref Range: 18.0 - 48.0 % 15.3 (L)  15.5 (L) 14.6 (L)    Lymph # Latest Ref Range: 1.0 - 4.8 K/uL 0.7 (L)  0.6 (L) 0.7 (L)    Mono% Latest Ref Range: 4.0 - 15.0 % 11.2  17.7 (H) 14.2    Mono # Latest Ref Range: 0.3 - 1.0 K/uL 0.5  0.6 0.6    Eosinophil% Latest Ref Range: 0.0 - 8.0 % 6.5  3.1 3.9    Eos # Latest Ref Range: 0.0 - 0.5 K/uL 0.3  0.1 0.2    Basophil% Latest Ref Range: 0.0 - 1.9 % 1.0  0.9 1.1    Baso # Latest Ref Range: 0.00 - 0.20 K/uL 0.00  0.00 0.00    nRBC Latest Ref Range: 0 /100 WBC 0  0 0    Differential Method Unknown Automated  Automated Automated    Sed Rate Latest Ref Range: 0 - 30 mm/Hr 56 (H)  91 (H)  73 (H)   Sodium Latest Ref Range: 136 - 145 mmol/L 135 (L)  139 137    Potassium Latest Ref Range: 3.5 - 5.1 mmol/L 3.8  3.8 3.8    Chloride Latest Ref Range: 95 - 110 mmol/L 98  102 102    CO2 Latest Ref Range: 23 - 29 mmol/L 31 (H)  29 29    BUN, Bld Latest Ref Range: 7 - 17 mg/dL 17  12 13    Creatinine Latest Ref Range: 0.70 - 1.20 mg/dL 0.70  0.60 (L) 0.70    eGFR if non African American Latest Ref Range: >60 mL/min/1.73 m^2 >60  >60 >60    eGFR if  Latest Ref Range: >60 mL/min/1.73 m^2 >60  >60 >60    Glucose Latest Ref Range: 74 - 106 mg/dL 114 (H)  89 93    Calcium Latest Ref Range: 8.4 - 10.2 mg/dL 9.8  9.8 9.7    Alkaline Phosphatase Latest Ref Range: 38 - 145 U/L 65  63 58    PROTEIN TOTAL Latest Ref Range: 6.3 - 8.2 g/dL 9.3 (H)  8.9 (H) 8.4 (H)    Albumin Latest Ref Range: 3.5 - 5.2 g/dL 4.1  4.2 4.1    BILIRUBIN TOTAL Latest Ref Range: 0.2 - 1.3 mg/dL 0.4  0.5 0.4    AST Latest Ref Range: 14 - 36 U/L 85 (H)   64 (H) 62 (H)    ALT Latest Ref Range: 10 - 44 U/L 28  26 22    CRP Latest Ref Range: 0.0 - 10.0 mg/L <5.0  <5.0 <5.0    CPK Latest Ref Range: 30 - 135 U/L 115  79 101    Hemoglobin A1C External Latest Ref Range: 4.0 - 6.0 %     6.0   SARS-CoV2 (COVID-19) Qualitative PCR Latest Ref Range: Not Detected   Not Detected      Aldolase Latest Ref Range: < OR = 8.1 U/L 5.9  5.5  4.7     Medication List with Changes/Refills   Current Medications    AZELASTINE (ASTELIN) 137 MCG (0.1 %) NASAL SPRAY    1 spray (137 mcg total) by Nasal route 2 (two) times daily.    CALCIUM CARBONATE (OS-ESTELA) 600 MG CALCIUM (1,500 MG) TAB    Take 600 mg by mouth 2 (two) times daily with meals.    CHLORTHALIDONE (HYGROTEN) 25 MG TAB    Take 1 tablet (25 mg total) by mouth once daily.    DICLOFENAC SODIUM (VOLTAREN) 1 % GEL    Apply 2 g topically 4 (four) times daily. Apply to areas of left ankle pain    KETOCONAZOLE (NIZORAL) 2 % SHAMPOO    Wash scalp and ears with medicated shampoo at least 2x/week - let sit at least 5 minutes prior to rinsing    LEVOCETIRIZINE (XYZAL) 5 MG TABLET    Take 1 tablet (5 mg total) by mouth every evening.    MAGNESIUM 30 MG TAB    Take by mouth Daily.    MULTIVITAMIN CAPSULE    Take 1 capsule by mouth once daily.    NIFEDIPINE (PROCARDIA-XL) 30 MG (OSM) 24 HR TABLET    TAKE 1 TABLET(30 MG) BY MOUTH EVERY DAY    ROSUVASTATIN (CRESTOR) 10 MG TABLET    Take 1 tablet (10 mg total) by mouth once daily.    VITAMIN D (VITAMIN D3) 1000 UNITS TAB    Take 1,000 Units by mouth once daily.       Assessment/Plans:-  59 y.o.F with history of L sided infiltrating ductal carcinoma of the L breast (dx on Jan 2017), HTN, depression, gastritis, and recently diagnosed myositis (uncertain if it's autoimmune vs medication induced), present today for follow up because of concern about myositis flare.     Patient started on Atelizumab/Abraxane on 11/7/18.  Patient developed swelling of the face on 1/4/19 and went to urgent care and given  short course of prednisone 20 mg BID. On 1/15/19, patient seen in hem/onc clinic with c/o of pressure and tightness around neck. CT scan of chest and neck did not reveal any vascular compression. Started on prednisone 60 mg with taper. On 1/22/18 patient with c/o proximal muscle weakness. CPK found to be elevated around 4k. Patient admitted and given solumedrol 80 mg IV on 1/22/18. Last infusion of Atelizumab on 12/19/18. Last infusion of Abraxane on 1/3/19. Given Solumedrol 1g x 1 on 1/23/18. Seen by Dermatology on 1/24/18 and biopsy done of skin rash. Given distribution of rashes, there was concern for dermatomyositis. Patient started on Solumedrol 125 mg IV BID at that time.  Skin biopsy resulted consistent with dermatomyositis.     Labs: CPK and Aldolase normalized. +DARCY 1:640 speckled with negative profile.  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 quantTB: indeterminate, T-spot: negative     Spirometry: normal, normal diffusion capacity. FVC: 81%, DLCO: 114%     Patient exhibits signs of dermatomyositis (heliotropic rash and gottron's papules).   Dermatomyositis can be associated with malignancy.  MRI of LUE showed edema of the deltoid and biceps.  Exam with proximal muscle weakness: b/l deltoids 4/5, b/l iliopsoas 4.4/5. 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   Given +TIF1 gamma positivity, most likely dermatomyositis is from underlying malignancy.      Failed 2nd swallow eval on 2/6/19. PEG placed 2/7/2019.     Current medications:  - prednisone 20mg   - IVIG Started Jan 2019 - last  dose April 7  - Rituxan 1000mg x 2 (last dose April 27)  - HCQ 200mg BID      Esophageal biopsy - negative for viral infection.  Nonspecific chronic inflammation.     EGD/Colonoscopy (July 2019) - normal. PEG tube removed      Fiboscan done Aug 2019 - showed fatty liver with no scarring/damage.,      Failed Cellcept - worsening leukopenia, elevated LFTs, and other side effects without improvement of symptoms      Recent studies demonstrated increased efficacy in IVIG when spaced out (Q2w instead of Q4w)     Patient is an intermediate metabolizer based on TPMT.  Cannot start imuran.  Labs still neutropenic and thrombocytopenic at this time - uncertain of the cause (radiation induce BM fibrosis vs medication induce?)      Vaccination completed - Prevnar and Shingles (completed Aug 2019) and flu vaccination for this season (Aug 2019)      Dermatomyositis - currently on Rituxan and Prednisone 20mg daily.  Tolerating well and improvement of symptoms.  Plan is taper steroid and continue Rituxan 1000mg x 2 every 6 months.     It appears that patient continued to fail steroid tapering on multiple occasion.  Patient is uptodate on all CA screening - next PET scan is July 2020.  Other consideration for continued myalgia includes neurological condition such as MG.  Will other MG panel and referral to neurology.     If patient continues to failed steroid tapering - consideration for tacrolimus + IVIG, Xeljanz, plasmapharesis/plasma exchange.       My addendum last time    60 year old female with dermatomyositis s/p PD1 inhibitor used for breast cancer  TIF1 gamma +    Low dose steroids didn't help    Rash+ muscle weakness+dysphagia     IVIG and steroids initial treatment    mtx caused LFT derangement,anemia and leukopenia   She didn't respond to IVIG, initially we gave IVIG 2 g/kg every 4 weeks and then 1 g/kg bw ever 2 weeks : poor response   She was on cellcept 500 mg daily and she had cytopenias   Imuran not an option since  she is intermediate metaboliser     She had significant periorbital edema,rash on the neck and thighs    We didn't think she was responding to the treatment     So we gave her rituximab 1000 mg x 2    She did well and then she flared again may 2020    We had to go up on the dose to 30 mg and then tapered it to 20 mg  We started plaquenil    CT chest abdomen and pelvis nml  Colonoscopy nml  PET lymphadenopathy  Echo nml    Myasthenia gravis panel negative     Last time she needed prednisone 10 mg     She was weak and her skin had flared    Refractory dermatomyositis    We resumed IVIG and xeljanz 5 mg bid     She had done really well        9/2020    She started xeljanz 5 mg bid mid august  She feels well  Swelling is better  Her weakness and fatigue is all gone  Face still looks red   Chest is all better and upper arm is great  Itch and rash seems better   Rash on the legs is gone     CK,aldolase nml  CRP nml   now   AST 61  GFR nml  ALT nml    plts 145 but stable  H/h 11.6/35.3    But the white count has dropped to 2.70    It has been low on and off since 2019 so this is not new     Her lymphocyte count is 900 cells/mm3    Lymphopenia    In the controlled clinical trials, confirmed decreases in absolute lymphocyte counts below 500 cells/mm3 occurred in 0.04% of patients for the 5 mg twice daily and 10 mg twice daily XELJANZ groups combined during the first 3 months of exposure.    Confirmed lymphocyte counts less than 500 cells/mm3 were associated with an increased incidence of treated and serious infections      Her neutrophil count is 1300 cells/mm3    Neutropenia    In the controlled clinical trials, confirmed decreases in ANC below 1000 cells/mm3 occurred in 0.07% of patients for the 5 mg twice daily and 10 mg twice daily XELJANZ groups combined during the first 3 months of exposure.    There were no confirmed decreases in ANC below 500 cells/mm3 observed in any treatment group.    There was no clear  relationship between neutropenia and the occurrence of serious infections.    In the long-term safety population, the pattern and incidence of confirmed decreases in ANC remained consistent with what was seen in the controlled clinical trials        Lymphocyte Abnormalities    Treatment with XELJANZ was associated with initial lymphocytosis at one month of exposure followed by a gradual decrease in mean absolute lymphocyte counts below the baseline of approximately 10% during 12 months of therapy. Lymphocyte counts less than 500 cells/mm3 were associated with an increased incidence of treated and serious infections.    Avoid initiation of XELJANZ/XELJANZ XR treatment in patients with a low lymphocyte count (i.e., less than 500 cells/mm3). In patients who develop a confirmed absolute lymphocyte count less than 500 cells/mm3, treatment with XELJANZ/XELJANZ XR is not recommended.    Monitor lymphocyte counts at baseline and every 3 months thereafter.       Neutropenia    Treatment with XELJANZ was associated with an increased incidence of neutropenia (less than 2000 cells/mm3) compared to placebo.    Avoid initiation of XELJANZ/XELJANZ XR treatment in patients with a low neutrophil count (i.e., ANC less than 1000 cells/mm3). For patients who develop a persistent ANC of 500 to 1000 cells/mm3, interrupt XELJANZ/XELJANZ XR dosing until ANC is greater than or equal to 1000 cells/mm3. In patients who develop an ANC less than 500 cells/mm3, treatment with XELJANZ/XELJANZ XR is not recommended.    Monitor neutrophil counts at baseline and after 4-8 weeks of treatment and every 3 months thereafter.     Anemia    Avoid initiation of XELJANZ/XELJANZ XR treatment in patients with a low hemoglobin level (i.e., less than 9 g/dL). Treatment with XELJANZ/XELJANZ XR should be interrupted in patients who develop hemoglobin levels less than 8 g/dL or whose hemoglobin level drops greater than 2 g/dL on treatment.    So at this point we  decided to continue the xeljanz 5 mg bid and IVIG     But we sent her to hematology and the counts further dropped     10/12/2020  We cut the xeljanz dose to 5 mg daily  No more swelling  Face looks clear,not much redness  The eyelid is not puffy and not intensely erythematous   Along the nasal folds there is some redness but again not intense  On the MCPs and Dorsal hands she has some erythema which is more pinkish  Not itchy  Sometimes these pink areas can turn pigmented  Now complaint to sunscreen      White count has improved from 2.72 to 2.80  H/h 11.3/34.7  plts nml  ANC has improved from 0.6 to 1.4  Lymphocyte count 0.9    Ck,aldolase nml  CRP nml  ESR 65      2/2021  Now completely off prednisone    Some hot flashes at nights  Not documented temperatures/some night sweats  Recent cancer screening negative  Ct chest,abdomen and pelvis and chest    She has fatigue if she has to do a lot of things  Facial rash is stable  On the hands : redness is stable  Sometimes left ankle and left knee is swollen,painful/tight   Not muscular weakness but she feels skinny  BMI is 29.39 though    White count 4.30  H/H 11.6/35.4  plts 223  Lymphocytes nml    AST went upto 52  Rest of LFTs ok  GFR nml  ESR > 120  CRP nml  CK,aldolase nml        2/2021 we made her xeljanz 7.5 mg daily  Gets IVIG     4/2021  Dermatology said    Patient appears to have 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 No need for addition of systemic steroids with controlled myopathy and otherwise absence of systemic disease due to lack of efficacy in controlling cutaneous disease, side effect profile.   Currently with evidence of both nonvasculopathic cutaneous disease (heliotrophe, facial rash, Gottron's papules) as well as a degree vasculopathic disease (nailfold vascular changes, evidence digital pulp ulcers/lesions, associated pain at these locations). Overall patient doing well,  states QOL not greatly affected by residual rashes.      Considerations for comorbid rosacea/seb derm at face (micropustules on exam today; some scaling at brows, nasal creases noted).      Recommendations:  - Strict sun protection measures always.   - Optimization of topicals as above: Elidel mixed 50/50 with ketoconazole cream BID PRN rash at face; Plexion wash daily at face; transition to Diprolene cream for rash at hands.   - Offered ILK to rash at hands, patient again declined.   - Consideration for addition of vasodilatory agent (e.g. nifedipine) for relief in associated pain at fingertip lesions.   - Otherwise c/w rheumatologic management with IVIG, tofacitinib.      Labs 4/2021    H/h 11.3/36.2  nml white count 5.27  plts nml    5/2021    Patient would like to consider steroid/intralesional kenalog injections  She had stopped xeljanz on 4/28 for the covid vaccine   White count was done 2 days after 4/28 which is on 4/30 and the count was 5.27  She got covid vaccine on 3/29   Stopped xeljanz till 4/6  Did the blood work on 4/8 and was on xeljanz at that time  So her white count was 3.80  Prior to that she was 4.30 and 5.19    CMP nml      5/2021  She has shown remarkable improvement   She used to be very erythematous on the face and the hands,but that has significantly resolved  She had rashes on the chest,upper back and legs and that has resolved  She had calcinosis and that has resolved  She has no muscle weakness  All she has is mild erythema on the forehead,nose,around the nasolabial fold,heliotrope rash on the eyelids,gottrons papules on the MCPs and PIPs   She has digital pitting with no active ulcers  She has nail fold capillary changes     10/2021    She is doing really well  She has not needed steroid creams or injections  Skin looks really good  She is not puffy,she is not weak    Left foot has a stabbing pain after she walks around for some time  Some aches and pains-exam nml  May be  neuropathy    8/2021    CRP 15.8  ESR > 130  She had sinus issues at this time  Ck,aldolase nml  AST still 51  ALT nml  GFR nml  H/h 11.6/34.7  nml white count,4.90  nml plts  GGT nml  Vit d nml    1/2022    She is doing really really well  No more red and pink rashes  No more edema  On xeljanz 5 mg daily/was on 7.5 mg daily   Insurance is denying-so appeal   Patient really needs to continue the current regimen since she has done really well,she has tried and failed many other drugs and stopping xeljanz will only cause flare up of the disease    Labs     Ck,aldolase nml  CRP nml  ESR 67  CMP nml  CBC nml      5/2022    She took xeljanz 5 mg daily for 2 to 3 months and then didn't have medication for a month- thankfully has not flared in the skin and muscles  Today's labs are pending - protein high,LFTs nml,CK slowly trending up,CRP mild elevation  IVIG - is still monthly   Exam-no rash and muscle strength normal      9/2022      On xeljanz 5 mg daily  She feels great  IVIG is monthly-but we have cut the dose to 1 g/kg monthly not 2 g/kg monthly    Right hand -looks a little flared - this is her driving hand     Aldolase nml  -Swgwkv-ljwzrw trending up  CRP nml  ESR Down to 67,it was > 120  CMP- AST upto 50  ALT nml  CBC - H/H 11.5/34.1,Nml plts,white count       12/2022    H/h 11.5/35  Nml white and plt count  Ck 238  Aldolase nml      3/2023    Calcium 11.1  She was taking 2 calciums a day  Protein is also high 11  Creat nml  AST down to 28  ALT nml    3/2023    IVIG is 1 g/kg monthly since may 2022  Xeljanz 5 mg daily - since sep 2022  In September -we had decided to increase xeljanz to 7.5 mg but she didn't do it  She had a mild flare at that time    Skin-doing great    She didn't increase the dose but 5 mg is keeping her stable    For 6 months she has done half the dose and she has still done well    Plan     -never had blood clot  -never had diverticulitis    Will just continue xeljanz - 5 mg daily    For  now we will continue IVIG 1 g/kg - taper it to 1 g/kg every 8 weeks     Off steroids    Dermatology -f/u    Sun screen overtly emphasised    - continue muscle strengthening exercise daily    CBC,CMP,ESR,CRP,ck,aldolase in 3 months    Follow up in 3 months     I had referred her to rheum-derm clinic,but looks like she is stable enough to be seen by me and derm separately    Vaccines  Covid x 2  Booster needed  Flu utd  Pneumonia utd  Shingles     DXA-osteopenia -2020  DEXA 2022  LOW BONE MINERAL DENSITY WITH A SIGNIFICANT DECREASE OF 5% IN THE LUMBAR SPINE BMD COMPARED TO THE PRIOR STUDY.  THE ESTIMATED 10 YEAR PROBABILITY OF HIP FRACTURE IS 0.2% AND OF A MAJOR OSTEOPOROTIC FRACTURE 3.2% RESPECTIVELY USING FRAX.  WE GAVE HER  - 1200 mg dietary calcium and Vitamin D3 1000 mg daily    Cut the calcium to 600 mg daily because serum calcium is high  Vitamind d3 -doesn't know the dose                    Mammos are UTD      Answers submitted by the patient for this visit:  Rheumatology Questionnaire (Submitted on 9/12/2022)  mouth sores: No  trouble swallowing: No  unexpected weight change: No  genital sore: No    Answers submitted by the patient for this visit:  Rheumatology Questionnaire (Submitted on 3/21/2023)  mouth sores: No  trouble swallowing: No  unexpected weight change: No  genital sore: No

## 2023-03-24 LAB — ALDOLASE SERPL-CCNC: 9.1 U/L (ref 1.2–7.6)

## 2023-05-02 ENCOUNTER — HOSPITAL ENCOUNTER (OUTPATIENT)
Dept: RADIOLOGY | Facility: HOSPITAL | Age: 63
Discharge: HOME | End: 2023-05-02
Attending: INTERNAL MEDICINE
Payer: MEDICARE

## 2023-05-02 DIAGNOSIS — Z85.3 HISTORY OF BREAST CANCER: ICD-10-CM

## 2023-05-02 DIAGNOSIS — Q45.3 PANCREATIC ABNORMALITY: ICD-10-CM

## 2023-05-02 PROCEDURE — 74177 CT ABD & PELVIS W/CONTRAST: CPT | Mod: TC

## 2023-05-02 PROCEDURE — 74177 CT ABDOMEN PELVIS WITH CONTRAST: ICD-10-PCS | Mod: 26,,, | Performed by: RADIOLOGY

## 2023-05-02 PROCEDURE — 74177 CT ABD & PELVIS W/CONTRAST: CPT | Mod: 26,,, | Performed by: RADIOLOGY

## 2023-05-02 PROCEDURE — 25500020 PHARM REV CODE 255: Performed by: INTERNAL MEDICINE

## 2023-05-02 RX ADMIN — IOHEXOL 100 ML: 350 INJECTION, SOLUTION INTRAVENOUS at 10:05

## 2023-05-03 ENCOUNTER — PATIENT MESSAGE (OUTPATIENT)
Dept: PRIMARY CARE CLINIC | Facility: CLINIC | Age: 63
End: 2023-05-03
Payer: MEDICARE

## 2023-05-03 ENCOUNTER — TELEPHONE (OUTPATIENT)
Dept: PRIMARY CARE CLINIC | Facility: CLINIC | Age: 63
End: 2023-05-03
Payer: MEDICARE

## 2023-05-03 DIAGNOSIS — R93.89 ENDOMETRIAL THICKENING ON ULTRASOUND: Primary | ICD-10-CM

## 2023-05-03 RX ORDER — TOFACITINIB 5 MG/1
TABLET, FILM COATED ORAL
Qty: 60 TABLET | Refills: 0 | Status: SHIPPED | OUTPATIENT
Start: 2023-05-03 | End: 2023-06-09 | Stop reason: SDUPTHER

## 2023-05-03 NOTE — TELEPHONE ENCOUNTER
Please call and notify pt:  Pancreas lesion is stable. We'll go through CT in detail at her upcoming visit. They did see that the cervical canal is thicker than normal in someone who's postmenopausal. Recommended getting transvaginal US. Ordered. Can get prior to seeing me.

## 2023-05-04 ENCOUNTER — TELEPHONE (OUTPATIENT)
Dept: PRIMARY CARE CLINIC | Facility: CLINIC | Age: 63
End: 2023-05-04
Payer: MEDICARE

## 2023-05-04 ENCOUNTER — HOSPITAL ENCOUNTER (OUTPATIENT)
Dept: RADIOLOGY | Facility: HOSPITAL | Age: 63
Discharge: HOME | End: 2023-05-04
Attending: INTERNAL MEDICINE
Payer: MEDICARE

## 2023-05-04 DIAGNOSIS — R93.89 THICKENED ENDOMETRIUM: Primary | ICD-10-CM

## 2023-05-04 DIAGNOSIS — R93.89 ENDOMETRIAL THICKENING ON ULTRASOUND: ICD-10-CM

## 2023-05-04 PROCEDURE — 76830 TRANSVAGINAL US NON-OB: CPT | Mod: 26,,, | Performed by: RADIOLOGY

## 2023-05-04 PROCEDURE — 76830 US TRANSVAGINAL NON OB: ICD-10-PCS | Mod: 26,,, | Performed by: RADIOLOGY

## 2023-05-04 PROCEDURE — 76830 TRANSVAGINAL US NON-OB: CPT | Mod: TC

## 2023-05-04 NOTE — TELEPHONE ENCOUNTER
Please call and notify pt:   Pelvic ultrasound does show slightly thickened uterus lining. I want her to f/u w/ GYN. Referral in just in case if she doesn't have one.

## 2023-05-05 NOTE — TELEPHONE ENCOUNTER
Pt advised of US results. Has seen Erin Mcdaniel MD in 2020. Will msg her office to see if Ms Sun can be scheduled

## 2023-05-05 NOTE — TELEPHONE ENCOUNTER
Referral from Dr Weeks.  Made aware you will return to clinic on Tuesday.  Please review us results and advise on type of visit to schedule.

## 2023-05-09 NOTE — TELEPHONE ENCOUNTER
We can plan on doing an EMB. It is only slightly thickened but the report is also showing fluid within the cavity.

## 2023-05-10 ENCOUNTER — PATIENT MESSAGE (OUTPATIENT)
Dept: PRIMARY CARE CLINIC | Facility: CLINIC | Age: 63
End: 2023-05-10
Payer: MEDICARE

## 2023-05-15 DIAGNOSIS — I70.0 AORTIC ATHEROSCLEROSIS: ICD-10-CM

## 2023-05-15 DIAGNOSIS — I73.9 PAD (PERIPHERAL ARTERY DISEASE): ICD-10-CM

## 2023-05-15 DIAGNOSIS — E78.5 HYPERLIPIDEMIA, UNSPECIFIED HYPERLIPIDEMIA TYPE: ICD-10-CM

## 2023-05-15 RX ORDER — ROSUVASTATIN CALCIUM 10 MG/1
10 TABLET, COATED ORAL DAILY
Qty: 90 TABLET | Refills: 3 | Status: SHIPPED | OUTPATIENT
Start: 2023-05-15 | End: 2024-03-11

## 2023-05-16 ENCOUNTER — OFFICE VISIT (OUTPATIENT)
Dept: PRIMARY CARE CLINIC | Facility: CLINIC | Age: 63
End: 2023-05-16
Payer: MEDICARE

## 2023-05-16 ENCOUNTER — LAB VISIT (OUTPATIENT)
Dept: LAB | Facility: HOSPITAL | Age: 63
End: 2023-05-16
Attending: INTERNAL MEDICINE
Payer: MEDICARE

## 2023-05-16 VITALS
TEMPERATURE: 98 F | SYSTOLIC BLOOD PRESSURE: 138 MMHG | HEART RATE: 96 BPM | DIASTOLIC BLOOD PRESSURE: 86 MMHG | OXYGEN SATURATION: 100 % | BODY MASS INDEX: 29.79 KG/M2 | WEIGHT: 178.81 LBS | HEIGHT: 65 IN

## 2023-05-16 DIAGNOSIS — R93.89 THICKENED ENDOMETRIUM: ICD-10-CM

## 2023-05-16 DIAGNOSIS — E11.9 CONTROLLED TYPE 2 DIABETES MELLITUS WITHOUT COMPLICATION, WITHOUT LONG-TERM CURRENT USE OF INSULIN: ICD-10-CM

## 2023-05-16 DIAGNOSIS — E11.9 CONTROLLED TYPE 2 DIABETES MELLITUS WITHOUT COMPLICATION, WITHOUT LONG-TERM CURRENT USE OF INSULIN: Primary | ICD-10-CM

## 2023-05-16 LAB
ESTIMATED AVG GLUCOSE: 131 MG/DL (ref 68–131)
HBA1C MFR BLD: 6.2 % (ref 4–5.6)

## 2023-05-16 PROCEDURE — 1159F MED LIST DOCD IN RCRD: CPT | Mod: CPTII,,, | Performed by: INTERNAL MEDICINE

## 2023-05-16 PROCEDURE — 36415 COLL VENOUS BLD VENIPUNCTURE: CPT | Mod: PN | Performed by: INTERNAL MEDICINE

## 2023-05-16 PROCEDURE — 3075F SYST BP GE 130 - 139MM HG: CPT | Mod: CPTII,,, | Performed by: INTERNAL MEDICINE

## 2023-05-16 PROCEDURE — 1160F PR REVIEW ALL MEDS BY PRESCRIBER/CLIN PHARMACIST DOCUMENTED: ICD-10-PCS | Mod: CPTII,,, | Performed by: INTERNAL MEDICINE

## 2023-05-16 PROCEDURE — 3044F HG A1C LEVEL LT 7.0%: CPT | Mod: CPTII,,, | Performed by: INTERNAL MEDICINE

## 2023-05-16 PROCEDURE — 3008F BODY MASS INDEX DOCD: CPT | Mod: CPTII,,, | Performed by: INTERNAL MEDICINE

## 2023-05-16 PROCEDURE — 99214 OFFICE O/P EST MOD 30 MIN: CPT | Mod: ,,, | Performed by: INTERNAL MEDICINE

## 2023-05-16 PROCEDURE — 3079F PR MOST RECENT DIASTOLIC BLOOD PRESSURE 80-89 MM HG: ICD-10-PCS | Mod: CPTII,,, | Performed by: INTERNAL MEDICINE

## 2023-05-16 PROCEDURE — 3044F PR MOST RECENT HEMOGLOBIN A1C LEVEL <7.0%: ICD-10-PCS | Mod: CPTII,,, | Performed by: INTERNAL MEDICINE

## 2023-05-16 PROCEDURE — 1160F RVW MEDS BY RX/DR IN RCRD: CPT | Mod: CPTII,,, | Performed by: INTERNAL MEDICINE

## 2023-05-16 PROCEDURE — 99999 PR PBB SHADOW E&M-EST. PATIENT-LVL V: ICD-10-PCS | Mod: PBBFAC,,, | Performed by: INTERNAL MEDICINE

## 2023-05-16 PROCEDURE — 99214 PR OFFICE/OUTPT VISIT, EST, LEVL IV, 30-39 MIN: ICD-10-PCS | Mod: ,,, | Performed by: INTERNAL MEDICINE

## 2023-05-16 PROCEDURE — 1159F PR MEDICATION LIST DOCUMENTED IN MEDICAL RECORD: ICD-10-PCS | Mod: CPTII,,, | Performed by: INTERNAL MEDICINE

## 2023-05-16 PROCEDURE — 3075F PR MOST RECENT SYSTOLIC BLOOD PRESS GE 130-139MM HG: ICD-10-PCS | Mod: CPTII,,, | Performed by: INTERNAL MEDICINE

## 2023-05-16 PROCEDURE — 83036 HEMOGLOBIN GLYCOSYLATED A1C: CPT | Performed by: INTERNAL MEDICINE

## 2023-05-16 PROCEDURE — 3079F DIAST BP 80-89 MM HG: CPT | Mod: CPTII,,, | Performed by: INTERNAL MEDICINE

## 2023-05-16 PROCEDURE — 3008F PR BODY MASS INDEX (BMI) DOCUMENTED: ICD-10-PCS | Mod: CPTII,,, | Performed by: INTERNAL MEDICINE

## 2023-05-16 PROCEDURE — 99999 PR PBB SHADOW E&M-EST. PATIENT-LVL V: CPT | Mod: PBBFAC,,, | Performed by: INTERNAL MEDICINE

## 2023-05-16 NOTE — PATIENT INSTRUCTIONS
Urban [place],     Please note that it is important that you have your annual appointment with Dr. Weeks every year to get updated labs and updated refills on your medications. But it is equally important to us at 65+ that you also have your Annual Enhanced Wellness Visit with one of our wellness providers every year. This visit is a little different than the annual appointment with Dr. Weeks. They ask you a lot of questions, screen your for your risk of falling, and tests your memory. This gathers a lot of information for Dr. Weeks that wouldn't normally be obtained. The wellness provider will share the visit with Dr. Weeks and she will be informed of any abnormal screenings. Please let me know if you have any additional questions.    That department should be in touch with you soon to be scheduled but you can also call Rin Wick to be scheduled at [phone]. Please let me know if you have any issues with scheduling or if you have any additional issues.    Thank you!     KORIN

## 2023-05-16 NOTE — PROGRESS NOTES
"Subjective:       Patient ID: Ermelinda Verde is a 62 y.o. female.    Chief Complaint: DM    HPI  New onset DM at our last visit 3/10/23 - a1c was 6.7.   Went to DM education.   Eating less candies. Cut back on carbs and also sugars.   Exercising twice a week.   Lost 3 lbs in the last 2 months.     CT A/P 5/2/23 for pancreatic lesion.  0.3 cm hypoattenuating lesion within the pancreatic tail is overall stable without arterial uptake.  No evidence of new enhancing lesions or metastatic disease in patient with history of breast cancer.  Stable hypoattenuating lesions throughout the liver favored to represent simple hepatic cysts.  Although, multiple are too small to characterize.  Endocervical canal thickening in postmenopausal patient.  Can consider obtaining dedicated transvaginal ultrasound for further characterization.  Diverticulosis coli without evidence of diverticulitis.    PELVIC US 5/4/23  Borderline upper limits of normal of endometrial thickness with small amount of free fluid within the endometrial canal. Consultation with gynecology as clinically warranted.  Leiomyomatous uterus.    -->scheduled to see Dr. Shepherd in [place], her old OB/GYN. Couldn't get appt at [facility] till end of month.     No abdominal or pelvic pain. No issues w/ urinating. No fevers/chills.     Review of Systems  Comprehensive review of systems otherwise negative. See history/subjective section for more details.    Objective:      Physical Exam    /86   Pulse 96   Temp 98.2 °F (36.8 °C) (Oral)   Ht 5' 5" (1.651 m)   Wt 81.1 kg (178 lb 12.7 oz)   LMP  (LMP Unknown)   SpO2 100%   BMI 29.75 kg/m²     GEN - A+OX4, NAD   HEENT - PERRL, EOMI, OP clear. MMM.   Neck - No thyromegaly or cervical LAD. No thyroid masses felt.  CV - RRR, no m/r   Chest - CTAB, no wheezing or rhonchi  Abd - S/NT/ND/+BS.   Ext - no LE edema. 2+ radial pulses.  Protective Sensation (w/ 10 gram monofilament):  Right: Intact  Left: Intact    Visual " Inspection:  Normal -  Bilateral    Pedal Pulses:   Right: Present  Left: Present    Posterior Tibialis Pulses:   Right:Present  Left: Present    Neuro - 5/5 BUE and BLE strength. Normal gait.   MSK - scoliosis. Normal gait.     Labs reviewed w/ pt.    Assessment/Plan     Ermelinda was seen today for hypertension.    Diagnoses and all orders for this visit:    Controlled type 2 diabetes mellitus without complication, without long-term current use of insulin - making changes in terms of diet. Lost 3lbs in 2 mo. Work to inc exercise.   -     Hemoglobin A1C; Future  -     Microalbumin/Creatinine Ratio, Urine; Future    Thickened endometrium - free fluid in the uterus on pelvic US. Will be seeing her GYn in [place] tomorrow. Printed out US and CT scan results. Discussed likely will need biopsy.     Not interested in filling out ACP.     Follow up in about 4 months (around 9/16/2023).      Reta Weeks MD  Department of Internal Medicine - [facility]  10:28 AM

## 2023-05-19 ENCOUNTER — INFUSION (OUTPATIENT)
Dept: INFECTIOUS DISEASES | Facility: HOSPITAL | Age: 63
End: 2023-05-19
Attending: INTERNAL MEDICINE
Payer: MEDICARE

## 2023-05-19 VITALS
HEART RATE: 100 BPM | BODY MASS INDEX: 30.08 KG/M2 | TEMPERATURE: 98 F | WEIGHT: 180.75 LBS | SYSTOLIC BLOOD PRESSURE: 148 MMHG | DIASTOLIC BLOOD PRESSURE: 74 MMHG | OXYGEN SATURATION: 99 %

## 2023-05-19 DIAGNOSIS — R13.19 ESOPHAGEAL DYSPHAGIA: ICD-10-CM

## 2023-05-19 DIAGNOSIS — M33.13 DERMATOMYOSITIS: Primary | ICD-10-CM

## 2023-05-19 PROCEDURE — 96366 THER/PROPH/DIAG IV INF ADDON: CPT

## 2023-05-19 PROCEDURE — 96375 TX/PRO/DX INJ NEW DRUG ADDON: CPT

## 2023-05-19 PROCEDURE — 25000003 PHARM REV CODE 250: Performed by: INTERNAL MEDICINE

## 2023-05-19 PROCEDURE — 96365 THER/PROPH/DIAG IV INF INIT: CPT

## 2023-05-19 PROCEDURE — 63600175 PHARM REV CODE 636 W HCPCS: Mod: JZ,JG | Performed by: INTERNAL MEDICINE

## 2023-05-19 RX ORDER — HEPARIN 100 UNIT/ML
500 SYRINGE INTRAVENOUS
Status: DISCONTINUED | OUTPATIENT
Start: 2023-05-19 | End: 2023-05-19 | Stop reason: HOSPADM

## 2023-05-19 RX ORDER — SODIUM CHLORIDE 0.9 % (FLUSH) 0.9 %
10 SYRINGE (ML) INJECTION
Status: CANCELLED | OUTPATIENT
Start: 2023-06-09

## 2023-05-19 RX ORDER — DIPHENHYDRAMINE HYDROCHLORIDE 50 MG/ML
50 INJECTION INTRAMUSCULAR; INTRAVENOUS
Status: COMPLETED | OUTPATIENT
Start: 2023-05-19 | End: 2023-05-19

## 2023-05-19 RX ORDER — SODIUM CHLORIDE 0.9 % (FLUSH) 0.9 %
10 SYRINGE (ML) INJECTION
Status: DISCONTINUED | OUTPATIENT
Start: 2023-05-19 | End: 2023-05-19 | Stop reason: HOSPADM

## 2023-05-19 RX ORDER — FAMOTIDINE 10 MG/ML
20 INJECTION INTRAVENOUS
Status: COMPLETED | OUTPATIENT
Start: 2023-05-19 | End: 2023-05-19

## 2023-05-19 RX ORDER — ACETAMINOPHEN 325 MG/1
650 TABLET ORAL
Status: COMPLETED | OUTPATIENT
Start: 2023-05-19 | End: 2023-05-19

## 2023-05-19 RX ORDER — HEPARIN 100 UNIT/ML
500 SYRINGE INTRAVENOUS
Status: CANCELLED | OUTPATIENT
Start: 2023-06-09

## 2023-05-19 RX ORDER — FAMOTIDINE 10 MG/ML
20 INJECTION INTRAVENOUS
Status: CANCELLED | OUTPATIENT
Start: 2023-06-09

## 2023-05-19 RX ORDER — ACETAMINOPHEN 325 MG/1
650 TABLET ORAL
Status: CANCELLED | OUTPATIENT
Start: 2023-06-09

## 2023-05-19 RX ORDER — DIPHENHYDRAMINE HYDROCHLORIDE 50 MG/ML
50 INJECTION INTRAMUSCULAR; INTRAVENOUS
Status: CANCELLED
Start: 2023-06-09

## 2023-05-19 RX ADMIN — FAMOTIDINE 20 MG: 10 INJECTION, SOLUTION INTRAVENOUS at 09:05

## 2023-05-19 RX ADMIN — DIPHENHYDRAMINE HYDROCHLORIDE 50 MG: 50 INJECTION, SOLUTION INTRAMUSCULAR; INTRAVENOUS at 09:05

## 2023-05-19 RX ADMIN — HUMAN IMMUNOGLOBULIN G 80 G: 40 LIQUID INTRAVENOUS at 10:05

## 2023-05-19 RX ADMIN — SODIUM CHLORIDE: 9 INJECTION, SOLUTION INTRAVENOUS at 09:05

## 2023-05-19 RX ADMIN — ACETAMINOPHEN 650 MG: 325 TABLET ORAL at 09:05

## 2023-05-19 NOTE — PROGRESS NOTES
Arrived for Privigen infusion. Tylenol 650 mg po, Pepcid 20 mg IVP, and Benadryl 50 IVP administered 30 minutes prior to infusion.  Privigen initiated at the following rates with titration every 30 minutes; 24ml/hr-49ml/hr-98 ml/hr-196 ml/hr, NS  250cc bag @ 25ml/hr concurrently with Privigen. Tolerated without any difficulty.Left unit in NAD.

## 2023-05-30 ENCOUNTER — TELEPHONE (OUTPATIENT)
Dept: OBSTETRICS AND GYNECOLOGY | Facility: CLINIC | Age: 63
End: 2023-05-30
Payer: MEDICARE

## 2023-05-30 ENCOUNTER — TELEPHONE (OUTPATIENT)
Dept: HEMATOLOGY/ONCOLOGY | Facility: CLINIC | Age: 63
End: 2023-05-30
Payer: MEDICARE

## 2023-05-30 NOTE — TELEPHONE ENCOUNTER
Patient states she would like to cancel EMB. She states that she saw another physician for it. Appt cancelled.

## 2023-05-30 NOTE — TELEPHONE ENCOUNTER
Spoike to Dr Verma and relayed Dr Reyna's answer. Dr Verma verbalized understanding and thanked nurse

## 2023-05-30 NOTE — TELEPHONE ENCOUNTER
----- Message from Rebekah Starkey sent at 5/30/2023  9:27 AM CDT -----  Contact: smooth Verde  MRN: [id number]  Home Phone      [phone]  Work Phone      Not on file.  Mobile          [phone]    Patient Care Team:  Reta Weeks MD as PCP - General (Internal Medicine)  Mckayla Ariza LPN as Care Coordinator (Internal Medicine)  Alex Reyna MD as Consulting Physician (Hematology and Oncology)  Caleb Madera MD as Consulting Physician (Rheumatology)  Boone Gonsales DPM as Consulting Physician (Podiatry)  OB? No  What phone number can you be reached at? [phone]  Message: patient is needing to reschedule procedure 05/31

## 2023-05-30 NOTE — TELEPHONE ENCOUNTER
"----- Message from Alex Reyna MD sent at 5/30/2023 10:48 AM CDT -----  That is OK  ----- Message -----  From: Phoebe Lozano RN  Sent: 5/30/2023  10:35 AM CDT  To: Alex Reyna MD    See Dr's below    ----- Message -----  From: Sapphire Saini  Sent: 5/30/2023  10:27 AM CDT  To: [facility]GUE[facility] Staff    Regarding: Pt advice  Contact: Dr. Bayron Mc  calling to gt approval to use estrogen for a week for the pt.   Please call and adv      Confirmed contact below:   Contact Name: Dr. Verma   Phone Number: [phone]               Additional Notes:  "Thank you for all that you do for our patients"                                               "

## 2023-06-09 RX ORDER — TOFACITINIB 5 MG/1
TABLET, FILM COATED ORAL
Qty: 60 TABLET | Refills: 0 | Status: SHIPPED | OUTPATIENT
Start: 2023-06-09 | End: 2023-09-20

## 2023-06-12 ENCOUNTER — OFFICE VISIT (OUTPATIENT)
Dept: HEMATOLOGY/ONCOLOGY | Facility: CLINIC | Age: 63
End: 2023-06-12
Payer: MEDICARE

## 2023-06-12 VITALS
DIASTOLIC BLOOD PRESSURE: 81 MMHG | RESPIRATION RATE: 18 BRPM | HEART RATE: 100 BPM | BODY MASS INDEX: 30.37 KG/M2 | OXYGEN SATURATION: 100 % | SYSTOLIC BLOOD PRESSURE: 147 MMHG | HEIGHT: 65 IN | WEIGHT: 182.31 LBS | TEMPERATURE: 98 F

## 2023-06-12 DIAGNOSIS — Z85.3 HISTORY OF BREAST CANCER IN FEMALE: ICD-10-CM

## 2023-06-12 DIAGNOSIS — M35.9 POLYMYOSITIS ASSOCIATED WITH AUTOIMMUNE DISEASE: Primary | ICD-10-CM

## 2023-06-12 DIAGNOSIS — M33.20 POLYMYOSITIS ASSOCIATED WITH AUTOIMMUNE DISEASE: Primary | ICD-10-CM

## 2023-06-12 PROCEDURE — 3079F DIAST BP 80-89 MM HG: CPT | Mod: CPTII,S$GLB,, | Performed by: INTERNAL MEDICINE

## 2023-06-12 PROCEDURE — 99999 PR PBB SHADOW E&M-EST. PATIENT-LVL III: CPT | Mod: PBBFAC,,, | Performed by: INTERNAL MEDICINE

## 2023-06-12 PROCEDURE — 99213 OFFICE O/P EST LOW 20 MIN: CPT | Mod: S$GLB,,, | Performed by: INTERNAL MEDICINE

## 2023-06-12 PROCEDURE — 99999 PR PBB SHADOW E&M-EST. PATIENT-LVL III: ICD-10-PCS | Mod: PBBFAC,,, | Performed by: INTERNAL MEDICINE

## 2023-06-12 PROCEDURE — 3044F HG A1C LEVEL LT 7.0%: CPT | Mod: CPTII,S$GLB,, | Performed by: INTERNAL MEDICINE

## 2023-06-12 PROCEDURE — 3008F BODY MASS INDEX DOCD: CPT | Mod: CPTII,S$GLB,, | Performed by: INTERNAL MEDICINE

## 2023-06-12 PROCEDURE — 3077F PR MOST RECENT SYSTOLIC BLOOD PRESSURE >= 140 MM HG: ICD-10-PCS | Mod: CPTII,S$GLB,, | Performed by: INTERNAL MEDICINE

## 2023-06-12 PROCEDURE — 3060F PR POS MICROALBUMINURIA RESULT DOCUMENTED/REVIEW: ICD-10-PCS | Mod: CPTII,S$GLB,, | Performed by: INTERNAL MEDICINE

## 2023-06-12 PROCEDURE — 99213 PR OFFICE/OUTPT VISIT, EST, LEVL III, 20-29 MIN: ICD-10-PCS | Mod: S$GLB,,, | Performed by: INTERNAL MEDICINE

## 2023-06-12 PROCEDURE — 3044F PR MOST RECENT HEMOGLOBIN A1C LEVEL <7.0%: ICD-10-PCS | Mod: CPTII,S$GLB,, | Performed by: INTERNAL MEDICINE

## 2023-06-12 PROCEDURE — 3077F SYST BP >= 140 MM HG: CPT | Mod: CPTII,S$GLB,, | Performed by: INTERNAL MEDICINE

## 2023-06-12 PROCEDURE — 3079F PR MOST RECENT DIASTOLIC BLOOD PRESSURE 80-89 MM HG: ICD-10-PCS | Mod: CPTII,S$GLB,, | Performed by: INTERNAL MEDICINE

## 2023-06-12 PROCEDURE — 3066F PR DOCUMENTATION OF TREATMENT FOR NEPHROPATHY: ICD-10-PCS | Mod: CPTII,S$GLB,, | Performed by: INTERNAL MEDICINE

## 2023-06-12 PROCEDURE — 3066F NEPHROPATHY DOC TX: CPT | Mod: CPTII,S$GLB,, | Performed by: INTERNAL MEDICINE

## 2023-06-12 PROCEDURE — 3060F POS MICROALBUMINURIA REV: CPT | Mod: CPTII,S$GLB,, | Performed by: INTERNAL MEDICINE

## 2023-06-12 PROCEDURE — 3008F PR BODY MASS INDEX (BMI) DOCUMENTED: ICD-10-PCS | Mod: CPTII,S$GLB,, | Performed by: INTERNAL MEDICINE

## 2023-06-12 NOTE — PROGRESS NOTES
Subjective:       Patient ID: Ermelinda Verde is a 62 y.o. female.    Chief Complaint: No chief complaint on file.      HPI Mrs Verde is a 62-year-old -American female who returns for follow-up of a history of recurrent triple negative left breast cancer - S/P chemo-immunotherapy.      Her course has been complicated by the development of auto-immune dermatomyositis.  She has been on IVIG every 8 weeks and daily tofacitinib.  Her symptoms have been under good control with that combination.      Her biggest complaint has been some recurrent left-sided sciatica.    She has no shortness of breath..  Her swallowing has been normal.  She dose have some allergies and sinus congestion.             Onc History:  She developed a palpable abnormality in her left breast in January 2017 which she noted on self-examination.  A diagnostic mammogram on January 19 showed a greater than 1 cm nodule in the upper outer portion of left breast.  By ultrasound this was lobulated and hypoechoic measuring 1.75 x 1.51 x 1.96 cm.     On January 24, 2017 a core needle biopsy was performed which showed infiltrating ductal carcinoma, high grade.  The tumor was ER negative, W[place] negative, and HER-2 negative.  A follow-up ultrasound on December 6 showed 2.5 x 2.2 x 1.5 cm left breast mass.  There was no abnormality noted in the left axilla.     She underwent sentinel lymph node biopsy on February 22.  That showed 4 negative lymph nodes.     She had 4 cycles of  Wilbur-adjuvantTaxotere and Cytoxan completed  on 5/9/17.     On June 26 she underwent left mastectomy.  That revealed 2 foci of invasive high-grade carcinoma measuring 14 mm and 1.5 mm.  Margins were negative.    She completed 4 cycles of adjuvant Adriamycin on September 12, 2017.      In October 2018, she developed some left supraclavicular lymphadenopathy which turned out to be recurrence.     A fine-needle aspirate of the lymph node was performed on October 19th.  That showed  metastatic carcinoma consistent with breast primary which was ER negative, RI negative and HER 2-negative.    She then received 3 cycles of Nab paclitaxel and atezolizumab.  She last received treatment on January 3, 2019.    PET-CT in March, 2019 showed complete response.    She then had consolidative radiation therapy in May 2019.    Her treatment was complicated by the development of dermatomyositis which resulted in the lengthy hospitalization from January 22nd to February 13th, 2019.  She is followed by Rheumatology and has been treated with steroids, IVIG, and low-dose methotrexate, and Cell-cept.  She received rituximab therapy 4/13/2- and 4/27/20.    CT Abd/Pelvis in May 2023 - K[facility]ANDRA  Review of Systems   Constitutional:  Negative for appetite change and unexpected weight change.   HENT:  Positive for congestion and sinus pressure. Negative for mouth sores.    Eyes:  Negative for visual disturbance.   Respiratory:  Negative for cough and shortness of breath.    Cardiovascular:  Negative for chest pain.   Gastrointestinal:  Negative for abdominal pain, constipation and diarrhea.   Genitourinary:  Negative for frequency.   Musculoskeletal:  Positive for arthralgias. Negative for back pain.        Left sciatica   Skin:  Positive for rash.   Neurological:  Negative for weakness and headaches.   Hematological:  Negative for adenopathy.   Psychiatric/Behavioral:  The patient is not nervous/anxious.      Objective:      Physical Exam  Vitals reviewed.   Constitutional:       General: She is not in acute distress.     Appearance: She is well-developed.   Eyes:      General: No scleral icterus.  Cardiovascular:      Rate and Rhythm: Normal rate and regular rhythm.   Pulmonary:      Effort: Pulmonary effort is normal.      Breath sounds: Normal breath sounds. No wheezing or rales.   Chest:   Breasts:     Right: Normal. No mass, nipple discharge or skin change.       Abdominal:      Palpations: Abdomen is soft. There is no  mass.      Tenderness: There is no abdominal tenderness.   Lymphadenopathy:      Cervical: No cervical adenopathy.      Upper Body:      Right upper body: No supraclavicular or axillary adenopathy.      Left upper body: No supraclavicular or axillary adenopathy.   Skin:     Findings: No rash.      Comments: tigtness of skin in chest /neck with post radiation change, left chest wall   Neurological:      Mental Status: She is alert and oriented to person, place, and time.   Psychiatric:         Behavior: Behavior normal.         Thought Content: Thought content normal.       Assessment:     Mammmo -  1. Polymyositis associated with autoimmune disease    2. History of breast cancer in female        Plan:     RTC 6 M    Route Chart for Scheduling  Med Onc Route Chart for Scheduling      Therapy Plan Information  Pre-Medications  acetaminophen tablet 650 mg  650 mg, Oral, Every 8 weeks  famotidine (PF) injection 20 mg  20 mg, Intravenous, Every 8 weeks  4. Pre-Medications  diphenhydrAMINE injection 50 mg  50 mg, Intravenous, Every 8 weeks  Medications  Immune Globulin G (IGG)-PRO-IGA 10 % injection (Privigen) 10 % injection 80 g  1 g/kg = 80 g, Intravenous, Every 8 weeks  Flushes  sodium chloride 0.9% 250 mL flush bag  Intravenous, Every 8 weeks  sodium chloride 0.9% flush 10 mL  10 mL, Intravenous, Every 8 weeks  heparin, porcine (PF) 100 unit/mL injection flush 500 Units  500 Units, Intravenous, Every 8 weeks  alteplase injection 2 mg  2 mg, Intra-Catheter, Every 8 weeks

## 2023-06-21 ENCOUNTER — LAB VISIT (OUTPATIENT)
Dept: LAB | Facility: HOSPITAL | Age: 63
End: 2023-06-21
Attending: INTERNAL MEDICINE
Payer: MEDICARE

## 2023-06-21 ENCOUNTER — OFFICE VISIT (OUTPATIENT)
Dept: RHEUMATOLOGY | Facility: CLINIC | Age: 63
End: 2023-06-21
Payer: MEDICARE

## 2023-06-21 VITALS
DIASTOLIC BLOOD PRESSURE: 86 MMHG | HEIGHT: 65 IN | HEART RATE: 95 BPM | WEIGHT: 183 LBS | SYSTOLIC BLOOD PRESSURE: 138 MMHG | BODY MASS INDEX: 30.49 KG/M2

## 2023-06-21 DIAGNOSIS — M33.13 DERMATOMYOSITIS: Primary | ICD-10-CM

## 2023-06-21 DIAGNOSIS — M33.13 DERMATOMYOSITIS: ICD-10-CM

## 2023-06-21 LAB
ALBUMIN SERPL BCP-MCNC: 4.3 G/DL (ref 3.5–5.2)
ALDOLASE SERPL-CCNC: 2.9 U/L (ref 1.2–7.6)
ALP SERPL-CCNC: 95 U/L (ref 55–135)
ALT SERPL W/O P-5'-P-CCNC: 27 U/L (ref 10–44)
ANION GAP SERPL CALC-SCNC: 11 MMOL/L (ref 8–16)
AST SERPL-CCNC: 35 U/L (ref 10–40)
BASOPHILS # BLD AUTO: 0.04 K/UL (ref 0–0.2)
BASOPHILS NFR BLD: 0.5 % (ref 0–1.9)
BILIRUB SERPL-MCNC: 0.3 MG/DL (ref 0.1–1)
BUN SERPL-MCNC: 17 MG/DL (ref 8–23)
CALCIUM SERPL-MCNC: 10.2 MG/DL (ref 8.7–10.5)
CHLORIDE SERPL-SCNC: 100 MMOL/L (ref 95–110)
CK SERPL-CCNC: 262 U/L (ref 20–180)
CO2 SERPL-SCNC: 26 MMOL/L (ref 23–29)
CREAT SERPL-MCNC: 1 MG/DL (ref 0.5–1.4)
CRP SERPL-MCNC: 6.9 MG/L (ref 0–8.2)
DIFFERENTIAL METHOD: ABNORMAL
EOSINOPHIL # BLD AUTO: 0.1 K/UL (ref 0–0.5)
EOSINOPHIL NFR BLD: 1.3 % (ref 0–8)
ERYTHROCYTE [DISTWIDTH] IN BLOOD BY AUTOMATED COUNT: 16.1 % (ref 11.5–14.5)
ERYTHROCYTE [SEDIMENTATION RATE] IN BLOOD BY PHOTOMETRIC METHOD: 51 MM/HR (ref 0–36)
EST. GFR  (NO RACE VARIABLE): >60 ML/MIN/1.73 M^2
GLUCOSE SERPL-MCNC: 64 MG/DL (ref 70–110)
HCT VFR BLD AUTO: 35.9 % (ref 37–48.5)
HGB BLD-MCNC: 11.6 G/DL (ref 12–16)
IMM GRANULOCYTES # BLD AUTO: 0.01 K/UL (ref 0–0.04)
IMM GRANULOCYTES NFR BLD AUTO: 0.1 % (ref 0–0.5)
LYMPHOCYTES # BLD AUTO: 3.3 K/UL (ref 1–4.8)
LYMPHOCYTES NFR BLD: 43.8 % (ref 18–48)
MCH RBC QN AUTO: 27.4 PG (ref 27–31)
MCHC RBC AUTO-ENTMCNC: 32.3 G/DL (ref 32–36)
MCV RBC AUTO: 85 FL (ref 82–98)
MONOCYTES # BLD AUTO: 0.5 K/UL (ref 0.3–1)
MONOCYTES NFR BLD: 7.1 % (ref 4–15)
NEUTROPHILS # BLD AUTO: 3.5 K/UL (ref 1.8–7.7)
NEUTROPHILS NFR BLD: 47.2 % (ref 38–73)
NRBC BLD-RTO: 0 /100 WBC
PLATELET # BLD AUTO: 252 K/UL (ref 150–450)
PMV BLD AUTO: 8.9 FL (ref 9.2–12.9)
POTASSIUM SERPL-SCNC: 3.3 MMOL/L (ref 3.5–5.1)
PROT SERPL-MCNC: 9.8 G/DL (ref 6–8.4)
RBC # BLD AUTO: 4.24 M/UL (ref 4–5.4)
SODIUM SERPL-SCNC: 137 MMOL/L (ref 136–145)
WBC # BLD AUTO: 7.49 K/UL (ref 3.9–12.7)

## 2023-06-21 PROCEDURE — 3044F HG A1C LEVEL LT 7.0%: CPT | Mod: CPTII,S$GLB,, | Performed by: INTERNAL MEDICINE

## 2023-06-21 PROCEDURE — 99214 OFFICE O/P EST MOD 30 MIN: CPT | Mod: S$GLB,,, | Performed by: INTERNAL MEDICINE

## 2023-06-21 PROCEDURE — 1159F PR MEDICATION LIST DOCUMENTED IN MEDICAL RECORD: ICD-10-PCS | Mod: CPTII,S$GLB,, | Performed by: INTERNAL MEDICINE

## 2023-06-21 PROCEDURE — 3060F POS MICROALBUMINURIA REV: CPT | Mod: CPTII,S$GLB,, | Performed by: INTERNAL MEDICINE

## 2023-06-21 PROCEDURE — 1159F MED LIST DOCD IN RCRD: CPT | Mod: CPTII,S$GLB,, | Performed by: INTERNAL MEDICINE

## 2023-06-21 PROCEDURE — 3060F PR POS MICROALBUMINURIA RESULT DOCUMENTED/REVIEW: ICD-10-PCS | Mod: CPTII,S$GLB,, | Performed by: INTERNAL MEDICINE

## 2023-06-21 PROCEDURE — 80053 COMPREHEN METABOLIC PANEL: CPT | Performed by: INTERNAL MEDICINE

## 2023-06-21 PROCEDURE — 36415 COLL VENOUS BLD VENIPUNCTURE: CPT | Performed by: INTERNAL MEDICINE

## 2023-06-21 PROCEDURE — 3008F BODY MASS INDEX DOCD: CPT | Mod: CPTII,S$GLB,, | Performed by: INTERNAL MEDICINE

## 2023-06-21 PROCEDURE — 86140 C-REACTIVE PROTEIN: CPT | Performed by: INTERNAL MEDICINE

## 2023-06-21 PROCEDURE — 3044F PR MOST RECENT HEMOGLOBIN A1C LEVEL <7.0%: ICD-10-PCS | Mod: CPTII,S$GLB,, | Performed by: INTERNAL MEDICINE

## 2023-06-21 PROCEDURE — 99214 PR OFFICE/OUTPT VISIT, EST, LEVL IV, 30-39 MIN: ICD-10-PCS | Mod: S$GLB,,, | Performed by: INTERNAL MEDICINE

## 2023-06-21 PROCEDURE — 82085 ASSAY OF ALDOLASE: CPT | Performed by: INTERNAL MEDICINE

## 2023-06-21 PROCEDURE — 3079F DIAST BP 80-89 MM HG: CPT | Mod: CPTII,S$GLB,, | Performed by: INTERNAL MEDICINE

## 2023-06-21 PROCEDURE — 3066F NEPHROPATHY DOC TX: CPT | Mod: CPTII,S$GLB,, | Performed by: INTERNAL MEDICINE

## 2023-06-21 PROCEDURE — 3066F PR DOCUMENTATION OF TREATMENT FOR NEPHROPATHY: ICD-10-PCS | Mod: CPTII,S$GLB,, | Performed by: INTERNAL MEDICINE

## 2023-06-21 PROCEDURE — 82550 ASSAY OF CK (CPK): CPT | Performed by: INTERNAL MEDICINE

## 2023-06-21 PROCEDURE — 85025 COMPLETE CBC W/AUTO DIFF WBC: CPT | Performed by: INTERNAL MEDICINE

## 2023-06-21 PROCEDURE — 99999 PR PBB SHADOW E&M-EST. PATIENT-LVL III: ICD-10-PCS | Mod: PBBFAC,,, | Performed by: INTERNAL MEDICINE

## 2023-06-21 PROCEDURE — 3075F SYST BP GE 130 - 139MM HG: CPT | Mod: CPTII,S$GLB,, | Performed by: INTERNAL MEDICINE

## 2023-06-21 PROCEDURE — 85652 RBC SED RATE AUTOMATED: CPT | Performed by: INTERNAL MEDICINE

## 2023-06-21 PROCEDURE — 3008F PR BODY MASS INDEX (BMI) DOCUMENTED: ICD-10-PCS | Mod: CPTII,S$GLB,, | Performed by: INTERNAL MEDICINE

## 2023-06-21 PROCEDURE — 3075F PR MOST RECENT SYSTOLIC BLOOD PRESS GE 130-139MM HG: ICD-10-PCS | Mod: CPTII,S$GLB,, | Performed by: INTERNAL MEDICINE

## 2023-06-21 PROCEDURE — 99999 PR PBB SHADOW E&M-EST. PATIENT-LVL III: CPT | Mod: PBBFAC,,, | Performed by: INTERNAL MEDICINE

## 2023-06-21 PROCEDURE — 3079F PR MOST RECENT DIASTOLIC BLOOD PRESSURE 80-89 MM HG: ICD-10-PCS | Mod: CPTII,S$GLB,, | Performed by: INTERNAL MEDICINE

## 2023-06-21 RX ORDER — FAMOTIDINE 10 MG/ML
20 INJECTION INTRAVENOUS
Status: CANCELLED | OUTPATIENT
Start: 2023-06-21

## 2023-06-21 RX ORDER — ACETAMINOPHEN 325 MG/1
650 TABLET ORAL
Status: CANCELLED | OUTPATIENT
Start: 2023-06-21

## 2023-06-21 RX ORDER — DIPHENHYDRAMINE HYDROCHLORIDE 50 MG/ML
50 INJECTION INTRAMUSCULAR; INTRAVENOUS
Status: CANCELLED
Start: 2023-06-21

## 2023-06-21 RX ORDER — HEPARIN 100 UNIT/ML
500 SYRINGE INTRAVENOUS
Status: CANCELLED | OUTPATIENT
Start: 2023-06-21

## 2023-06-21 RX ORDER — SODIUM CHLORIDE 0.9 % (FLUSH) 0.9 %
10 SYRINGE (ML) INJECTION
Status: CANCELLED | OUTPATIENT
Start: 2023-06-21

## 2023-06-21 NOTE — PROGRESS NOTES
RHEUMATOLOGY CLINIC FOLLOW UP VISIT  Chief complaints:-  To follow up for Myositis follow up     HPI:-  Ermelinda Javed a 62 y.o. pleasant female  with history of L sided infiltrating ductal carcinoma of the L breast (dx on Jan 2017), HTN, depression, gastritis, and recently diagnosed myositis (uncertain if it's autoimmune vs medication induced), present today with concern about myositis flare     Patient was initially send from hem/onc clinic for evaluation of possible inflammatory myositis.  Patient was hospitalized from 1/22/19 till 2/13/19 for myositis.      L breast cancer s/p completion of 4 cycles of demi-adjuvant taxotere and cytoxan (completed on 5/9/17) and mastectomy (6/26/17) and then 4 cycles of adjuvant Adriamycin (completed 9/12/17). In 10/2017 - patient developed L supraclavicular lymphadenopathy which FNA confirmed as reoccurrence of previously treated breast cancer.      Patient started on Atelizumab/Abraxane on 11/7/18. Per chart review, patient noticed rashes on the dorsum of her hands on 11/11/18. Seen in urgent care and given topical steroid cream. Then received two more infusions on Atelizumab/Abraxane on 11/21/18 and 12/5/18. Started to notice puffiness around the eyes on 12/11/18. Received another Abraxane infusion on 12/12/18 but this time with hydrocortisone 50 mg which helped reduce the swelling around the eyes. Developed swelling of the face again on 12/15/18. Next infusion of Abraxane done on 12/19/18 with Solucortef and Atelizumab held. Abraxane given again on 1/3/19 with no IV steroid. Patient developed swelling of the face on 1/4/19 and went to urgent care and given short course of prednisone 20 mg BID. On 1/15/19, patient seen in hem/onc clinic with c/o of pressure and tightness around neck. CT scan of chest and neck did not reveal any vascular compression. Started on prednisone 60 mg with taper. On 1/22/18 patient with c/o  proximal muscle weakness. CPK found to be elevated around 4k. Patient admitted and given solumedrol 80 mg IV on 1/22/18. Last infusion of Atelizumab on 12/19/18. Last infusion of Abraxane on 1/3/19. Given Solumedrol 1g x 1 on 1/23/18. Seen by Dermatology on 1/24/18 and biopsy done of skin rash. Given distribution of rashes, there was concern for dermatomyositis. Patient started on Solumedrol 125 mg IV BID at this time.      During her hospitalization patient was started on high dose steroid Solumedrol 80mg IV x 1, 1g x 1, and then 125mg BID until tapered down to medrol 48mg.  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  Patient was started on IVIG (400mg/kg/daily x 5 day) 1/29/19-2/2.   Patient started on MTX 20mg SQ (Feb 5 2019) with folic acid. MTX SQ was transitioned to PO because concern about rehab administration.  Patient failed swallow eval and PEG tube was placed.        Denies any family history of autoimmune diseases.     No smoking, EtOH, recreational drug usage.     Denies any photosensitivity, joint swelling, unintentional weigh loss, abdominal pain, night sweats, CP, SOB.  +oral thrush.      Completed rehab at [facility].  Completed IVIG (2/28 and 3/1).  Had mild HA after infusion.  Doing well.  A lot stronger - able to do household chores since discharge.  Not back at baseline yet ~80%.  Mild weakness with increased activities.  Able to ambulate without assistance (but is still a fall precaution).  Tolerating diet via PEG tube.  Completed rehab.      MTX discontinued 2/18 with concern of toxicity (decrease blood counts and transaminatis).  Folic acid increased to 4mg daily.  Still on medrol 32mg daily with atorvaquone for PCP prophylaxis.  Bactrim d/c because of interaction with leucovorin.  Leucovorin 5mg daily started - Leucovorin discontinued.  Continues to be on folic acid 4mg daily     Radiation completed (28 days) completed May 8.       EGD/colonoscopy done on July 29 - normal.  PEG  tube removed     Last PET scan (June 20) showed negative for metastasis.  At this time, will monitor per oncology and repeat PET scan in Sept.  No treatment needed at this time.      Rash was biopsied and evaluated by dermatology.   Biopsy report conclusive of dermatomyositis.  Was given topical steroid which provided minimal alleviation of symptoms.  +paco      6/2020    Completed Rituxan on 4/27 (1g x 2).  Had a flare after infusion requiring increase of steroid.  Since then patient had been doing well - improving of myalgia and rash.  Still having mild edema and generalized weakness (especially in the afternoon/evening).       Patient was started on HCQ - compliant on all home medications.  Continues to work out daily.  Finds most difficulty with getting in and out of the car.   Denies any dysphagia, alopecia, arthralgia, abdominal pain, CP, SOB, N/V, chills, or urinary symptoms.      7/2020    Rash all over her body  Red rash on the left leg  Face once again has erythema and heliotrope rash  Prednisone down to 10 mg and looks like she has a flare again  On plaquenil 200 mg bid  Wears sunscreen  Avoids the direct sun    Most recent labs     Ck nml  Aldolase nml  ESR 53  CRP nml  Mild anemia  AST 67      9/2020    She started xeljanz 5 mg bid mid august  She feels well  Swelling is better  Her weakness and fatigue is all gone  Face still looks red   Chest is all better and upper arm is great  Itch and rash seems better   Rash on the legs is gone     CK,aldolase nml  CRP nml   now   AST 61  GFR nml  ALT nml    plts 145 but stable  H/h 11.6/35.3    But the white count has dropped to 2.70    It has been low on and off since 2019 so this is not new     She has a sinus issue going on   She has greenish d/c  She has a tinge of blood       10/12/2020  We cut the xeljanz dose to 5 mg daily  No more swelling  Face looks clear,not much redness  The eyelid is not puffy and not intensely erythematous   Along the  nasal folds there is some redness but again not intense  On the MCPs and Dorsal hands she has some erythema which is more pinkish  Not itchy  Sometimes these pink areas can turn pigmented  Now complaint to sunscreen      White count has improved from 2.72 to 2.80  H/h 11.3/34.7  plts nml  ANC has improved from 0.6 to 1.4  Lymphocyte count 0.9    2/2021    She has fatigue if she has to do a lot of things  Facial rash is stable  On the hands : redness is stable  Sometimes left ankle and left knee is swollen,painful/tight   Not muscular weakness but she feels skinny  BMI is 29.39 though    White count 4.30  H/H 11.6/35.4  plts 223  Lymphocytes nml    AST went upto 52  Rest of LFTs ok  GFR nml  ESR > 120  CRP nml  CK,aldolase nml    2/2021 we made her xeljanz 7.5 mg daily  Gets IVIG     4/2021  Dermatology said    Patient appears to have 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 No need for addition of systemic steroids with controlled myopathy and otherwise absence of systemic disease due to lack of efficacy in controlling cutaneous disease, side effect profile.   Currently with evidence of both nonvasculopathic cutaneous disease (heliotrophe, facial rash, Gottron's papules) as well as a degree vasculopathic disease (nailfold vascular changes, evidence digital pulp ulcers/lesions, associated pain at these locations). Overall patient doing well, states QOL not greatly affected by residual rashes.      Considerations for comorbid rosacea/seb derm at face (micropustules on exam today; some scaling at brows, nasal creases noted).      Recommendations:  - Strict sun protection measures always.   - Optimization of topicals as above: Elidel mixed 50/50 with ketoconazole cream BID PRN rash at face; Plexion wash daily at face; transition to Diprolene cream for rash at hands.   - Offered ILK to rash at hands, patient again declined.   - Consideration for  addition of vasodilatory agent (e.g. nifedipine) for relief in associated pain at fingertip lesions.   - Otherwise c/w rheumatologic management with IVIG, tofacitinib.      Labs 4/2021    H/h 11.3/36.2  nml white count 5.27  plts nml    5/2021    Patient would like to consider steroid/intralesional kenalog injections  She had stopped xeljanz on 4/28 for the covid vaccine   White count was done 2 days after 4/28 which is on 4/30 and the count was 5.27  She got covid vaccine on 3/29   Stopped xeljanz till 4/6  Did the blood work on 4/8 and was on xeljanz at that time  So her white count was 3.80  Prior to that she was 4.30 and 5.19    CMP nml    10/2021    She is doing really well  She has not needed steroid creams or injections  Skin looks really good  She is not puffy,she is not weak    Left foot has a stabbing pain after she walks around for some time  Some aches and pains    8/2021    CRP 15.8  ESR > 130  She had sinus issues at this time  Ck,aldolase nml  AST still 51  ALT nml  GFR nml  H/h 11.6/34.7  nml white count,4.90  nml plts  GGT nml  Vit d nml    1/2022    She is doing really really well  No more red and pink rashes  No more edema  On xeljanz 5 mg daily/was on 7.5 mg daily   Insurance is denying-so appeal     Labs     Ck,aldolase nml  CRP nml  ESR 67  CMP nml  CBC nml      5/2022    She took xeljanz 5 mg daily for 2 to 3 months and then didn't have medication for a month- thankfully has not flared in the skin and muscles  Today's labs are pending   IVIG - is still monthly     9/2022      On xeljanz 5 mg daily  She feels great  IVIG is monthly    Right hand -looks a little flared - this is her driving hand     Aldolase nml  -Stable  CRP nml  ESR Down to 67,it was > 120  CMP- AST upto 50  ALT nml  CBC - H/H 11.5/34.1,Nml plts,white count       12/2022    H/h 11.5/35  Nml white and plt count  Ck 238  Aldolase nml      3/2023    Calcium 11.1  She was taking 2 calciums a day  Protein is also high  11  Creat nml  AST down to 28  ALT nml    3/2023    IVIG is 1 g/kg monthly since may 2022  Xeljanz 5 mg daily - since sep 2022    Skin-doing great    For 6 months she has done half the dose and she has still done well    Review of Systems   Constitutional:  Negative for chills, diaphoresis, fever, malaise/fatigue and weight loss.   HENT:  Negative for congestion, ear discharge, ear pain, hearing loss, nosebleeds, sinus pain and tinnitus.    Eyes:  Positive for discharge. Negative for photophobia, pain and redness.   Respiratory:  Negative for cough, hemoptysis, sputum production, shortness of breath, wheezing and stridor.    Cardiovascular:  Negative for chest pain, palpitations, orthopnea, claudication, leg swelling and PND.   Gastrointestinal:  Positive for constipation. Negative for abdominal pain, diarrhea, heartburn, nausea and vomiting.   Genitourinary:  Negative for dysuria, frequency, hematuria and urgency.   Musculoskeletal:  Positive for myalgias. Negative for back pain, joint pain and neck pain.   Skin:  Positive for rash.   Neurological:  Negative for dizziness, tingling, tremors, weakness and headaches.   Endo/Heme/Allergies:  Does not bruise/bleed easily.   Psychiatric/Behavioral:  Negative for depression, hallucinations and suicidal ideas. The patient is not nervous/anxious and does not have insomnia.      Past Medical History:   Diagnosis Date    Anemia 2019    Anxiety 2018    Breast cancer 2017    left    Controlled type 2 diabetes mellitus without complication, without long-term current use of insulin 2023    Depression     Dermatomyositis     Diverticulosis     Erosive esophagitis     Gastritis     Hepatic cyst     Hypertension     Immune disorder 2019    Joint pain 2019    Keloid cicatrix 2005    Thyroiditis     Vitamin B12 deficiency 3/8/2018       Past Surgical History:   Procedure Laterality Date    BREAST BIOPSY Left     BREAST RECONSTRUCTION Left 2017     SECTION       COLONOSCOPY  11/08/2011    repeat in 10 yrs.    COLONOSCOPY N/A 7/29/2019    Procedure: COLONOSCOPY;  Surgeon: Pernell Rosas MD;  Location: [facility] (4TH FLR);  Service: Endoscopy;  Laterality: N/A;  Patient would like to use her PEG tube for bowel prep and then have her PEG tube removed after EGD and colonoscopy procedures while she is still sedated.    D&C      ESOPHAGOGASTRODUODENOSCOPY N/A 1/29/2019    Procedure: EGD (ESOPHAGOGASTRODUODENOSCOPY);  Surgeon: Pernell Rosas MD;  Location: [facility] (2ND FLR);  Service: Endoscopy;  Laterality: N/A;    ESOPHAGOGASTRODUODENOSCOPY N/A 7/29/2019    Procedure: EGD (ESOPHAGOGASTRODUODENOSCOPY);  Surgeon: Pernell Rosas MD;  Location: [facility] (4TH FLR);  Service: Endoscopy;  Laterality: N/A;  EGD for follow-up of erosive esophagitis per Dr. Rosas    Patient would like to use her PEG tube for bowel prep and then have her PEG tube removed after EGD and colonoscopy procedures while she is still sedated.    INSERTION OF TUNNELED CENTRAL VENOUS CATHETER (CVC) WITH SUBCUTANEOUS PORT Right 10/31/2018    Procedure: [id number];  Surgeon: Deo Palencia MD;  [address]  Service: General;  Laterality: Right;    MASTECTOMY Left 06/26/2017    MYOMECTOMY      PORTACATH PLACEMENT      SENTINEL LYMPH NODE BIOPSY  02/2017    left    SKIN BIOPSY  2019    TOTAL REDUCTION MAMMOPLASTY Right 2017    UPPER GASTROINTESTINAL ENDOSCOPY          Social History     Tobacco Use    Smoking status: Never    Smokeless tobacco: Never   Substance Use Topics    Alcohol use: Not Currently    Drug use: No       Family History   Problem Relation Age of Onset    Hypertension Mother     Heart disease Father     Hypertension Father     Breast cancer Sister 52    No Known Problems Sister     No Known Problems Brother     No Known Problems Brother     No Known Problems Brother     Thyroid disease Daughter         hyperthyroidism s/p thyroidectomy    No Known Problems Daughter     No Known  "Problems Son     Colon cancer Neg Hx     Ovarian cancer Neg Hx     Celiac disease Neg Hx     Cirrhosis Neg Hx     Colon polyps Neg Hx     Esophageal cancer Neg Hx     Inflammatory bowel disease Neg Hx     Liver cancer Neg Hx     Liver disease Neg Hx     Rectal cancer Neg Hx     Stomach cancer Neg Hx     Ulcerative colitis Neg Hx     Melanoma Neg Hx        Review of patient's allergies indicates:  No Known Allergies    Vitals:    06/21/23 1107   BP: 138/86   Pulse: 95   Weight: 83 kg (182 lb 15.7 oz)   Height: 5' 5" (1.651 m)   PainSc:   2   PainLoc: Leg       Physical Exam  HENT:      Head: Normocephalic and atraumatic.   Eyes:      Pupils: Pupils are equal, round, and reactive to light.   Cardiovascular:      Rate and Rhythm: Normal rate and regular rhythm.      Heart sounds: Normal heart sounds.   Pulmonary:      Effort: Pulmonary effort is normal.      Breath sounds: Normal breath sounds.   Abdominal:      General: Bowel sounds are normal.      Palpations: Abdomen is soft.   Musculoskeletal:         General: Normal range of motion.      Cervical back: Normal range of motion and neck supple.   Skin:     General: Skin is warm and dry.      Findings: No erythema or rash.      Comments: Right hand - rash is back again,left hand not so prominent     Neurological:      Mental Status: She is alert and oriented to person, place, and time.      Gait: Gait is intact.   Psychiatric:         Mood and Affect: Mood and affect normal.         Cognition and Memory: Memory normal.         Judgment: Judgment normal.     Digital pitting +    Labs:  Results for ROMEO PEREZ (MRN [id number]) as of 6/17/2020 10:12   Ref. Range 4/22/2020 11:51 4/24/2020 11:43 5/25/2020 09:34 6/10/2020 11:25 6/10/2020 11:26   WBC Latest Ref Range: 3.90 - 12.70 K/uL 4.30  3.60 (L) 4.60    RBC Latest Ref Range: 4.00 - 5.40 M/uL 4.49  4.52 4.47    Hemoglobin Latest Ref Range: 12.0 - 16.0 g/dL 12.1  12.5 12.4    Hematocrit Latest Ref Range: 37.0 - 48.5 % " 38.2  38.7 38.4    MCV Latest Ref Range: 82 - 98 fL 85  86 86    MCH Latest Ref Range: 27.0 - 31.0 pg 27.0  27.7 27.8    MCHC Latest Ref Range: 32.0 - 36.0 g/dL 31.8 (L)  32.4 32.4    RDW Latest Ref Range: 11.5 - 14.5 % 16.0 (H)  17.2 (H) 17.3 (H)    Platelets Latest Ref Range: 150 - 350 K/uL 157  206 164    MPV Latest Ref Range: 7.4 - 10.4 fL 7.3 (L)  7.0 (L) 7.2 (L)    Gran% Latest Ref Range: 38.0 - 73.0 % 66.0  62.8 66.2    Gran # (ANC) Latest Ref Range: 1.8 - 7.7 K/uL 2.8  2.2 3.0    Lymph% Latest Ref Range: 18.0 - 48.0 % 15.3 (L)  15.5 (L) 14.6 (L)    Lymph # Latest Ref Range: 1.0 - 4.8 K/uL 0.7 (L)  0.6 (L) 0.7 (L)    Mono% Latest Ref Range: 4.0 - 15.0 % 11.2  17.7 (H) 14.2    Mono # Latest Ref Range: 0.3 - 1.0 K/uL 0.5  0.6 0.6    Eosinophil% Latest Ref Range: 0.0 - 8.0 % 6.5  3.1 3.9    Eos # Latest Ref Range: 0.0 - 0.5 K/uL 0.3  0.1 0.2    Basophil% Latest Ref Range: 0.0 - 1.9 % 1.0  0.9 1.1    Baso # Latest Ref Range: 0.00 - 0.20 K/uL 0.00  0.00 0.00    nRBC Latest Ref Range: 0 /100 WBC 0  0 0    Differential Method Unknown Automated  Automated Automated    Sed Rate Latest Ref Range: 0 - 30 mm/Hr 56 (H)  91 (H)  73 (H)   Sodium Latest Ref Range: 136 - 145 mmol/L 135 (L)  139 137    Potassium Latest Ref Range: 3.5 - 5.1 mmol/L 3.8  3.8 3.8    Chloride Latest Ref Range: 95 - 110 mmol/L 98  102 102    CO2 Latest Ref Range: 23 - 29 mmol/L 31 (H)  29 29    BUN, Bld Latest Ref Range: 7 - 17 mg/dL 17  12 13    Creatinine Latest Ref Range: 0.70 - 1.20 mg/dL 0.70  0.60 (L) 0.70    eGFR if non African American Latest Ref Range: >60 mL/min/1.73 m^2 >60  >60 >60    eGFR if  Latest Ref Range: >60 mL/min/1.73 m^2 >60  >60 >60    Glucose Latest Ref Range: 74 - 106 mg/dL 114 (H)  89 93    Calcium Latest Ref Range: 8.4 - 10.2 mg/dL 9.8  9.8 9.7    Alkaline Phosphatase Latest Ref Range: 38 - 145 U/L 65  63 58    PROTEIN TOTAL Latest Ref Range: 6.3 - 8.2 g/dL 9.3 (H)  8.9 (H) 8.4 (H)    Albumin Latest Ref  Range: 3.5 - 5.2 g/dL 4.1  4.2 4.1    BILIRUBIN TOTAL Latest Ref Range: 0.2 - 1.3 mg/dL 0.4  0.5 0.4    AST Latest Ref Range: 14 - 36 U/L 85 (H)  64 (H) 62 (H)    ALT Latest Ref Range: 10 - 44 U/L 28  26 22    CRP Latest Ref Range: 0.0 - 10.0 mg/L <5.0  <5.0 <5.0    CPK Latest Ref Range: 30 - 135 U/L 115  79 101    Hemoglobin A1C External Latest Ref Range: 4.0 - 6.0 %     6.0   SARS-CoV2 (COVID-19) Qualitative PCR Latest Ref Range: Not Detected   Not Detected      Aldolase Latest Ref Range: < OR = 8.1 U/L 5.9  5.5  4.7     Medication List with Changes/Refills   Current Medications    AZELASTINE (ASTELIN) 137 MCG (0.1 %) NASAL SPRAY    1 spray (137 mcg total) by Nasal route 2 (two) times daily.    CALCIUM CARBONATE (OS-ESTELA) 600 MG CALCIUM (1,500 MG) TAB    Take 600 mg by mouth once daily.    CHLORTHALIDONE (HYGROTEN) 25 MG TAB    Take 1 tablet (25 mg total) by mouth once daily.    DICLOFENAC SODIUM (VOLTAREN) 1 % GEL    Apply 2 g topically 4 (four) times daily. Apply to areas of left ankle pain    KETOCONAZOLE (NIZORAL) 2 % SHAMPOO    Wash scalp and ears with medicated shampoo at least 2x/week - let sit at least 5 minutes prior to rinsing    LEVOCETIRIZINE (XYZAL) 5 MG TABLET    Take 1 tablet (5 mg total) by mouth every evening.    MAGNESIUM 30 MG TAB    Take by mouth Daily.    MULTIVITAMIN CAPSULE    Take 1 capsule by mouth once daily.    NIFEDIPINE (PROCARDIA-XL) 30 MG (OSM) 24 HR TABLET    TAKE 1 TABLET(30 MG) BY MOUTH EVERY DAY    ROSUVASTATIN (CRESTOR) 10 MG TABLET    Take 1 tablet (10 mg total) by mouth once daily.    VITAMIN D (VITAMIN D3) 1000 UNITS TAB    Take 1,000 Units by mouth once daily.    XELJANZ 5 MG TAB    Take 1 tablet by mouth twice a day as directed by physician.       Assessment/Plans:-  59 y.o.F with history of L sided infiltrating ductal carcinoma of the L breast (dx on Jan 2017), HTN, depression, gastritis, and recently diagnosed myositis (uncertain if it's autoimmune vs medication induced),  present today for follow up because of concern about myositis flare.     Patient started on Atelizumab/Abraxane on 11/7/18.  Patient developed swelling of the face on 1/4/19 and went to urgent care and given short course of prednisone 20 mg BID. On 1/15/19, patient seen in hem/onc clinic with c/o of pressure and tightness around neck. CT scan of chest and neck did not reveal any vascular compression. Started on prednisone 60 mg with taper. On 1/22/18 patient with c/o proximal muscle weakness. CPK found to be elevated around 4k. Patient admitted and given solumedrol 80 mg IV on 1/22/18. Last infusion of Atelizumab on 12/19/18. Last infusion of Abraxane on 1/3/19. Given Solumedrol 1g x 1 on 1/23/18. Seen by Dermatology on 1/24/18 and biopsy done of skin rash. Given distribution of rashes, there was concern for dermatomyositis. Patient started on Solumedrol 125 mg IV BID at that time.  Skin biopsy resulted consistent with dermatomyositis.     Labs: CPK and Aldolase normalized. +DARCY 1:640 speckled with negative profile.  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 quantTB: indeterminate, T-spot: negative     Spirometry: normal, normal diffusion capacity. FVC: 81%, DLCO: 114%     Patient exhibits signs of dermatomyositis (heliotropic rash and gottron's papules).   Dermatomyositis can be associated with malignancy.  MRI of LUE showed edema of the deltoid and biceps.  Exam with proximal muscle weakness: b/l deltoids 4/5, b/l iliopsoas 4.4/5. 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   Given +TIF1 gamma  positivity, most likely dermatomyositis is from underlying malignancy.      Failed 2nd swallow eval on 2/6/19. PEG placed 2/7/2019.     Current medications:  - prednisone 20mg   - IVIG Started Jan 2019 - last dose April 7  - Rituxan 1000mg x 2 (last dose April 27)  - HCQ 200mg BID      Esophageal biopsy - negative for viral infection.  Nonspecific chronic inflammation.     EGD/Colonoscopy (July 2019) - normal. PEG tube removed      Fiboscan done Aug 2019 - showed fatty liver with no scarring/damage.,      Failed Cellcept - worsening leukopenia, elevated LFTs, and other side effects without improvement of symptoms      Recent studies demonstrated increased efficacy in IVIG when spaced out (Q2w instead of Q4w)     Patient is an intermediate metabolizer based on TPMT.  Cannot start imuran.  Labs still neutropenic and thrombocytopenic at this time - uncertain of the cause (radiation induce BM fibrosis vs medication induce?)      Vaccination completed - Prevnar and Shingles (completed Aug 2019) and flu vaccination for this season (Aug 2019)      Dermatomyositis - currently on Rituxan and Prednisone 20mg daily.  Tolerating well and improvement of symptoms.  Plan is taper steroid and continue Rituxan 1000mg x 2 every 6 months.     It appears that patient continued to fail steroid tapering on multiple occasion.  Patient is uptodate on all CA screening - next PET scan is July 2020.  Other consideration for continued myalgia includes neurological condition such as MG.  Will other MG panel and referral to neurology.     If patient continues to failed steroid tapering - consideration for tacrolimus + IVIG, Xeljanz, plasmapharesis/plasma exchange.       My addendum last time    60 year old female with dermatomyositis s/p PD1 inhibitor used for breast cancer  TIF1 gamma +    Low dose steroids didn't help    Rash+ muscle weakness+dysphagia     IVIG and steroids initial treatment    mtx caused LFT derangement,anemia and leukopenia    She didn't respond to IVIG, initially we gave IVIG 2 g/kg every 4 weeks and then 1 g/kg bw ever 2 weeks : poor response   She was on cellcept 500 mg daily and she had cytopenias   Imuran not an option since she is intermediate metaboliser     She had significant periorbital edema,rash on the neck and thighs    We didn't think she was responding to the treatment     So we gave her rituximab 1000 mg x 2    She did well and then she flared again may 2020    We had to go up on the dose to 30 mg and then tapered it to 20 mg  We started plaquenil    CT chest abdomen and pelvis nml  Colonoscopy nml  PET lymphadenopathy  Echo nml    Myasthenia gravis panel negative     Last time she needed prednisone 10 mg     She was weak and her skin had flared    Refractory dermatomyositis    We resumed IVIG and xeljanz 5 mg bid     She had done really well        9/2020    She started xeljanz 5 mg bid mid august  She feels well  Swelling is better  Her weakness and fatigue is all gone  Face still looks red   Chest is all better and upper arm is great  Itch and rash seems better   Rash on the legs is gone     CK,aldolase nml  CRP nml   now   AST 61  GFR nml  ALT nml    plts 145 but stable  H/h 11.6/35.3    But the white count has dropped to 2.70    It has been low on and off since 2019 so this is not new     Her lymphocyte count is 900 cells/mm3    Lymphopenia    In the controlled clinical trials, confirmed decreases in absolute lymphocyte counts below 500 cells/mm3 occurred in 0.04% of patients for the 5 mg twice daily and 10 mg twice daily XELJANZ groups combined during the first 3 months of exposure.    Confirmed lymphocyte counts less than 500 cells/mm3 were associated with an increased incidence of treated and serious infections      Her neutrophil count is 1300 cells/mm3    Neutropenia    In the controlled clinical trials, confirmed decreases in ANC below 1000 cells/mm3 occurred in 0.07% of patients for the 5 mg twice  daily and 10 mg twice daily XELJANZ groups combined during the first 3 months of exposure.    There were no confirmed decreases in ANC below 500 cells/mm3 observed in any treatment group.    There was no clear relationship between neutropenia and the occurrence of serious infections.    In the long-term safety population, the pattern and incidence of confirmed decreases in ANC remained consistent with what was seen in the controlled clinical trials        Lymphocyte Abnormalities    Treatment with XELJANZ was associated with initial lymphocytosis at one month of exposure followed by a gradual decrease in mean absolute lymphocyte counts below the baseline of approximately 10% during 12 months of therapy. Lymphocyte counts less than 500 cells/mm3 were associated with an increased incidence of treated and serious infections.    Avoid initiation of XELJANZ/XELJANZ XR treatment in patients with a low lymphocyte count (i.e., less than 500 cells/mm3). In patients who develop a confirmed absolute lymphocyte count less than 500 cells/mm3, treatment with XELJANZ/XELJANZ XR is not recommended.    Monitor lymphocyte counts at baseline and every 3 months thereafter.       Neutropenia    Treatment with XELJANZ was associated with an increased incidence of neutropenia (less than 2000 cells/mm3) compared to placebo.    Avoid initiation of XELJANZ/XELJANZ XR treatment in patients with a low neutrophil count (i.e., ANC less than 1000 cells/mm3). For patients who develop a persistent ANC of 500 to 1000 cells/mm3, interrupt XELJANZ/XELJANZ XR dosing until ANC is greater than or equal to 1000 cells/mm3. In patients who develop an ANC less than 500 cells/mm3, treatment with XELJANZ/XELJANZ XR is not recommended.    Monitor neutrophil counts at baseline and after 4-8 weeks of treatment and every 3 months thereafter.     Anemia    Avoid initiation of XELJANZ/XELJANZ XR treatment in patients with a low hemoglobin level (i.e., less than 9  g/dL). Treatment with XELJANZ/XELJANZ XR should be interrupted in patients who develop hemoglobin levels less than 8 g/dL or whose hemoglobin level drops greater than 2 g/dL on treatment.    So at this point we decided to continue the xeljanz 5 mg bid and IVIG     But we sent her to hematology and the counts further dropped     10/12/2020  We cut the xeljanz dose to 5 mg daily  No more swelling  Face looks clear,not much redness  The eyelid is not puffy and not intensely erythematous   Along the nasal folds there is some redness but again not intense  On the MCPs and Dorsal hands she has some erythema which is more pinkish  Not itchy  Sometimes these pink areas can turn pigmented  Now complaint to sunscreen      White count has improved from 2.72 to 2.80  H/h 11.3/34.7  plts nml  ANC has improved from 0.6 to 1.4  Lymphocyte count 0.9    Ck,aldolase nml  CRP nml  ESR 65      2/2021  Now completely off prednisone    Some hot flashes at nights  Not documented temperatures/some night sweats  Recent cancer screening negative  Ct chest,abdomen and pelvis and chest    She has fatigue if she has to do a lot of things  Facial rash is stable  On the hands : redness is stable  Sometimes left ankle and left knee is swollen,painful/tight   Not muscular weakness but she feels skinny  BMI is 29.39 though    White count 4.30  H/H 11.6/35.4  plts 223  Lymphocytes nml    AST went upto 52  Rest of LFTs ok  GFR nml  ESR > 120  CRP nml  CK,aldolase nml        2/2021 we made her xeljanz 7.5 mg daily  Gets IVIG     4/2021  Dermatology said    Patient appears to have 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 No need for addition of systemic steroids with controlled myopathy and otherwise absence of systemic disease due to lack of efficacy in controlling cutaneous disease, side effect profile.   Currently with evidence of both nonvasculopathic cutaneous disease  (heliotrophe, facial rash, Gottron's papules) as well as a degree vasculopathic disease (nailfold vascular changes, evidence digital pulp ulcers/lesions, associated pain at these locations). Overall patient doing well, states QOL not greatly affected by residual rashes.      Considerations for comorbid rosacea/seb derm at face (micropustules on exam today; some scaling at brows, nasal creases noted).      Recommendations:  - Strict sun protection measures always.   - Optimization of topicals as above: Elidel mixed 50/50 with ketoconazole cream BID PRN rash at face; Plexion wash daily at face; transition to Diprolene cream for rash at hands.   - Offered ILK to rash at hands, patient again declined.   - Consideration for addition of vasodilatory agent (e.g. nifedipine) for relief in associated pain at fingertip lesions.   - Otherwise c/w rheumatologic management with IVIG, tofacitinib.      Labs 4/2021    H/h 11.3/36.2  nml white count 5.27  plts nml    5/2021    Patient would like to consider steroid/intralesional kenalog injections  She had stopped xeljanz on 4/28 for the covid vaccine   White count was done 2 days after 4/28 which is on 4/30 and the count was 5.27  She got covid vaccine on 3/29   Stopped xeljanz till 4/6  Did the blood work on 4/8 and was on xeljanz at that time  So her white count was 3.80  Prior to that she was 4.30 and 5.19    CMP nml      5/2021  She has shown remarkable improvement   She used to be very erythematous on the face and the hands,but that has significantly resolved  She had rashes on the chest,upper back and legs and that has resolved  She had calcinosis and that has resolved  She has no muscle weakness  All she has is mild erythema on the forehead,nose,around the nasolabial fold,heliotrope rash on the eyelids,gottrons papules on the MCPs and PIPs   She has digital pitting with no active ulcers  She has nail fold capillary changes     10/2021    She is doing really well  She has  not needed steroid creams or injections  Skin looks really good  She is not puffy,she is not weak    Left foot has a stabbing pain after she walks around for some time  Some aches and pains-exam nml  May be neuropathy    8/2021    CRP 15.8  ESR > 130  She had sinus issues at this time  Ck,aldolase nml  AST still 51  ALT nml  GFR nml  H/h 11.6/34.7  nml white count,4.90  nml plts  GGT nml  Vit d nml    1/2022    She is doing really really well  No more red and pink rashes  No more edema  On xeljanz 5 mg daily/was on 7.5 mg daily   Insurance is denying-so appeal   Patient really needs to continue the current regimen since she has done really well,she has tried and failed many other drugs and stopping xeljanz will only cause flare up of the disease    Labs     Ck,aldolase nml  CRP nml  ESR 67  CMP nml  CBC nml      5/2022    She took xeljanz 5 mg daily for 2 to 3 months and then didn't have medication for a month- thankfully has not flared in the skin and muscles  Today's labs are pending - protein high,LFTs nml,CK slowly trending up,CRP mild elevation  IVIG - is still monthly   Exam-no rash and muscle strength normal      9/2022      On xeljanz 5 mg daily  She feels great  IVIG is monthly-but we have cut the dose to 1 g/kg monthly not 2 g/kg monthly    Right hand -looks a little flared - this is her driving hand     Aldolase nml  -Dwzjoe-pdevdb trending up  CRP nml  ESR Down to 67,it was > 120  CMP- AST upto 50  ALT nml  CBC - H/H 11.5/34.1,Nml plts,white count       12/2022    H/h 11.5/35  Nml white and plt count  Ck 238  Aldolase nml      3/2023    Calcium 11.1  She was taking 2 calciums a day  Protein is also high 11  Creat nml  AST down to 28  ALT nml    3/2023    IVIG is 1 g/kg monthly since may 2022  Xeljanz 5 mg daily - since sep 2022  In September -we had decided to increase xeljanz to 7.5 mg but she didn't do it  She had a mild flare at that time    Skin-doing great    She didn't increase the dose but 5  mg is keeping her stable    For 6 months she has done half the dose and she has still done well    CRP 12  ESR 77  Aldolase 9.1  Ck 242  H/h 11.2/36.6    6/2023    She has muscle weakness with activity   This started after we dropped the dose of IVIG to every 8 weeks  Sciatica left side  Exam 5/5 strength  Bending over-some pain  Lifting baby- she has pain    Rash resolved    On xeljanz 5 mg daily      She has been taking crestor for a long time  So this might be a flare due to tapering off IVIG and not the statins      Plan     -never had blood clot  -never had diverticulitis    Labs today    Will just continue xeljanz - 5 mg daily    Lets go back to IVIG 2 G/KG BW EVERY 4 WEEKS    Off steroids    Dermatology -f/u    Sun screen overtly emphasised    - continue muscle strengthening exercise daily    CBC,CMP,ESR,CRP,ck,aldolase today and in 3 months    Follow up in 3 months     I had referred her to rheum-derm clinic,but looks like she is stable enough to be seen by me and derm separately    Vaccines  Covid x 2  Booster needed  Flu utd  Pneumonia utd  Shingles     DXA-osteopenia -2020  DEXA 2022  LOW BONE MINERAL DENSITY WITH A SIGNIFICANT DECREASE OF 5% IN THE LUMBAR SPINE BMD COMPARED TO THE PRIOR STUDY.  THE ESTIMATED 10 YEAR PROBABILITY OF HIP FRACTURE IS 0.2% AND OF A MAJOR OSTEOPOROTIC FRACTURE 3.2% RESPECTIVELY USING FRAX.  WE GAVE HER  - 1200 mg dietary calcium and Vitamin D3 1000 mg daily    Cut the calcium to 600 mg daily because serum calcium is high  Vitamind d3 -doesn't know the dose                    Mammos are UTD      Answers submitted by the patient for this visit:  Rheumatology Questionnaire (Submitted on 6/17/2023)  mouth sores: No  trouble swallowing: No  unexpected weight change: No  genital sore: No

## 2023-06-21 NOTE — PROGRESS NOTES
Rapid3 Question Responses and Scores 6/17/2023   MDHAQ Score 0.6   Psychologic Score 3.3   Pain Score 1.5   When you awakened in the morning OVER THE LAST WEEK, did you feel stiff? Yes   If Yes, please indicate the number of hours until you are as limber as you will be for the day 2   Fatigue Score 2   Global Health Score 2   RAPID3 Score 1.83     Answers submitted by the patient for this visit:  Rheumatology Questionnaire (Submitted on 6/17/2023)  fever: No  eye redness: No  mouth sores: No  headaches: No  shortness of breath: No  chest pain: No  trouble swallowing: No  diarrhea: No  constipation: No  unexpected weight change: No  genital sore: No  dysuria: No  During the last 3 days, have you had a skin rash?: No  Bruises or bleeds easily: No  cough: No

## 2023-07-12 ENCOUNTER — INFUSION (OUTPATIENT)
Dept: INFECTIOUS DISEASES | Facility: HOSPITAL | Age: 63
End: 2023-07-12
Attending: INTERNAL MEDICINE
Payer: MEDICARE

## 2023-07-12 VITALS
BODY MASS INDEX: 30.12 KG/M2 | TEMPERATURE: 98 F | HEART RATE: 99 BPM | DIASTOLIC BLOOD PRESSURE: 75 MMHG | OXYGEN SATURATION: 99 % | WEIGHT: 180.75 LBS | HEIGHT: 65 IN | RESPIRATION RATE: 18 BRPM | SYSTOLIC BLOOD PRESSURE: 143 MMHG

## 2023-07-12 DIAGNOSIS — M33.13 DERMATOMYOSITIS: Primary | ICD-10-CM

## 2023-07-12 DIAGNOSIS — R13.19 ESOPHAGEAL DYSPHAGIA: ICD-10-CM

## 2023-07-12 PROCEDURE — 96366 THER/PROPH/DIAG IV INF ADDON: CPT

## 2023-07-12 PROCEDURE — 96365 THER/PROPH/DIAG IV INF INIT: CPT

## 2023-07-12 PROCEDURE — 96375 TX/PRO/DX INJ NEW DRUG ADDON: CPT

## 2023-07-12 PROCEDURE — 25000003 PHARM REV CODE 250: Performed by: INTERNAL MEDICINE

## 2023-07-12 PROCEDURE — 96374 THER/PROPH/DIAG INJ IV PUSH: CPT

## 2023-07-12 PROCEDURE — 63600175 PHARM REV CODE 636 W HCPCS: Mod: JZ,JG | Performed by: INTERNAL MEDICINE

## 2023-07-12 RX ORDER — FAMOTIDINE 10 MG/ML
20 INJECTION INTRAVENOUS
Status: CANCELLED | OUTPATIENT
Start: 2023-07-19

## 2023-07-12 RX ORDER — HEPARIN 100 UNIT/ML
500 SYRINGE INTRAVENOUS
Status: DISCONTINUED | OUTPATIENT
Start: 2023-07-12 | End: 2023-07-12 | Stop reason: HOSPADM

## 2023-07-12 RX ORDER — ACETAMINOPHEN 325 MG/1
650 TABLET ORAL
Status: CANCELLED | OUTPATIENT
Start: 2023-07-19

## 2023-07-12 RX ORDER — SODIUM CHLORIDE 0.9 % (FLUSH) 0.9 %
10 SYRINGE (ML) INJECTION
Status: DISCONTINUED | OUTPATIENT
Start: 2023-07-12 | End: 2023-07-12 | Stop reason: HOSPADM

## 2023-07-12 RX ORDER — DIPHENHYDRAMINE HYDROCHLORIDE 50 MG/ML
50 INJECTION INTRAMUSCULAR; INTRAVENOUS
Status: CANCELLED
Start: 2023-07-19

## 2023-07-12 RX ORDER — SODIUM CHLORIDE 0.9 % (FLUSH) 0.9 %
10 SYRINGE (ML) INJECTION
Status: CANCELLED | OUTPATIENT
Start: 2023-07-19

## 2023-07-12 RX ORDER — ACETAMINOPHEN 325 MG/1
650 TABLET ORAL
Status: COMPLETED | OUTPATIENT
Start: 2023-07-12 | End: 2023-07-12

## 2023-07-12 RX ORDER — DIPHENHYDRAMINE HYDROCHLORIDE 50 MG/ML
50 INJECTION INTRAMUSCULAR; INTRAVENOUS
Status: COMPLETED | OUTPATIENT
Start: 2023-07-12 | End: 2023-07-12

## 2023-07-12 RX ORDER — HEPARIN 100 UNIT/ML
500 SYRINGE INTRAVENOUS
Status: CANCELLED | OUTPATIENT
Start: 2023-07-19

## 2023-07-12 RX ORDER — FAMOTIDINE 10 MG/ML
20 INJECTION INTRAVENOUS
Status: COMPLETED | OUTPATIENT
Start: 2023-07-12 | End: 2023-07-12

## 2023-07-12 RX ADMIN — ACETAMINOPHEN 650 MG: 325 TABLET ORAL at 08:07

## 2023-07-12 RX ADMIN — FAMOTIDINE 20 MG: 10 INJECTION, SOLUTION INTRAVENOUS at 08:07

## 2023-07-12 RX ADMIN — HUMAN IMMUNOGLOBULIN G 80 G: 40 LIQUID INTRAVENOUS at 09:07

## 2023-07-12 RX ADMIN — SODIUM CHLORIDE: 9 INJECTION, SOLUTION INTRAVENOUS at 08:07

## 2023-07-12 RX ADMIN — DIPHENHYDRAMINE HYDROCHLORIDE 50 MG: 50 INJECTION INTRAMUSCULAR; INTRAVENOUS at 08:07

## 2023-07-12 NOTE — PROGRESS NOTES
Arrived for Privigen infusion. Tylenol 650 mg po, Pepcid 20 mg IVP, and Benadryl 50 IVP administered 30 minutes prior to infusion.  Privigen initiated at the following rates with titration every 30 minutes; 25ml/hr-49ml/hr-98 ml/hr-196 ml/hr, NS  250cc bag @ 25ml/hr concurrently with Privigen. Tolerated without any difficulty.Left unit in NAD.

## 2023-07-13 ENCOUNTER — PATIENT MESSAGE (OUTPATIENT)
Dept: RHEUMATOLOGY | Facility: CLINIC | Age: 63
End: 2023-07-13
Payer: MEDICARE

## 2023-07-13 ENCOUNTER — PATIENT MESSAGE (OUTPATIENT)
Dept: PRIMARY CARE CLINIC | Facility: CLINIC | Age: 63
End: 2023-07-13
Payer: MEDICARE

## 2023-07-13 DIAGNOSIS — I10 BENIGN ESSENTIAL HYPERTENSION: ICD-10-CM

## 2023-07-13 RX ORDER — NIFEDIPINE 30 MG/1
30 TABLET, EXTENDED RELEASE ORAL DAILY
Qty: 90 TABLET | Refills: 3 | Status: SHIPPED | OUTPATIENT
Start: 2023-07-13

## 2023-07-13 NOTE — TELEPHONE ENCOUNTER
NIFEdipine (PROCARDIA-XL) 30 MG (OSM) 24 hr tablet 90 tablet 3 12/16/2022  No   Sig: TAKE 1 TABLET(30 MG) BY MOUTH EVERY DAY   Sent to pharmacy as: NIFEdipine (PROCARDIA-XL) 30 MG (OSM) 24 hr tablet   Class: Normal   Order: [id number]   Date/Time Signed: 12/16/2022 07:49       E-Prescribing Status: Receipt confirmed by pharmacy (12/16/2022  7:49 AM

## 2023-07-20 NOTE — ASSESSMENT & PLAN NOTE
59yo F with history of L sided infiltrating ductal carcinoma of the L breast (dx on Jan 2017), HTN, depression, and gastritis was send from hem/onc clinic for evaluation of possible inflammatory myositis.     Patient started on Atelizumab/Abraxane on 11/7/18. Per chart review, patient noticed rashes on the dorsum of her hands on 11/11/18. Seen in urgent care and given topical steroid cream. Then received two more infusions on Atelizumab/Abraxane on 11/21/18 and 12/5/18. Started to notice puffiness around the eyes on 12/11/18. Received another Abraxane infusion on 12/12/18 but this time with hydrocortisone 50 mg which helped reduce the swelling around the eyes. Developed swelling of the face again on 12/15/18. Next infusion of Abraxane done on 12/19/18 with Solucortef and Atelizumab held. Abraxane given again on 1/3/19 with no IV steroid. Patient developed swelling of the face on 1/4/19 and went to urgent care and given short course of prednisone 20 mg BID. On 1/15/19, patient seen in hem/onc clinic with c/o of pressure and tightness around neck. CT scan of chest and neck did not reveal any vascular compression. Started on prednisone 60 mg with taper. On 1/22/18 patient with c/o proximal muscle weakness. CPK found to be elevated around 4k. Patient admitted and given solumedrol 80 mg IV on 1/22/18. Last infusion of Atelizumab on 12/19/18. Last infusion of Abraxane on 1/3/19. Given Solumedrol 1g x 1 on 1/23/18. Seen by Dermatology on 1/24/18 and biopsy done of skin rash. Given distribution of rashes, there was concern for dermatomyositis. Patient started on Solumedrol 125 mg IV BID at this time.      Labs: ESR 50, CRP 31.1, CPK 4562 (trending down), Aldolase 13.6.  , ALT 64.  UA normal.  CBC unremarkable.  +DARCY 1:640 speckled with negative profile.  Complements normal. Normal GGT. RPR, HIV negative. Hep B/C negative.  Strongyloides negative.    Spirometry: normal, normal diffusion capacity. FVC: 81%, DLCO:  "114%    Case reports of docetaxel related inflammatory myositis had been documented. "...taxanes have been noted to cause disabling but transient arthralgia and myalgias; it is important to consider the possibility of inflammatory myopathy as a possible complication in patients undergoing treatment with these agents." - A case of docetaxel induced myositis and review of literature. Austin et al 2015.    Case reports in Rheumatology   Case reports of atezolizumb (immunomodulator) cause of myositis  - Lucy et al 2017 "Myositis as an adverse even of immune checkpoint blockade for cancer therapy.  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   Dermatomyositis can be associated with malignancy.  MRI of LUE showed edema of the deltoid and biceps.  Exam with proximal muscle weakness: b/l deltoids 4/5, b/l iliopsoas 4.4/5. 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      Inpatient treatments so far:  - Solumedrol 80 mg IV x 1 on 1/22/19  - Solumedrol 1 g IV x 1 on 1/23/19.  - Solumedrol 125 mg IV BID since 1/24/19 -->being tapered down.     Plan:  - chemotherapy can be re-started as it may help treat Dermatomyositis. Would recommend against checkpoint inhibitor, however, as it may have been a trigger for the dermatomyositis  - continue to monitor CPK, aldolase, AST/ALT  - Decrease Solumderol to 30mg IV q12h.  - continue IVIg 400 mg/kg daily x 5 days    - MTX 15 mg sc weekly (Tuesdays) with daily folic acid.   - recommend ID fast track consult for vaccinations.  - f/u Quant TB  - f/u esophageal biopsy results  - start physical therapy.   - f/u myomarker panel and HMGCR   - f/u with Dr. Swift and Dr. Campos in " Rheumatology on 2/15/19 at 2PM   Dupixent Counseling: I discussed with the patient the risks of dupilumab including but not limited to eye infection and irritation, cold sores, injection site reactions, worsening of asthma, allergic reactions and increased risk of parasitic infection.  Live vaccines should be avoided while taking dupilumab. Dupilumab will also interact with certain medications such as warfarin and cyclosporine. The patient understands that monitoring is required and they must alert us or the primary physician if symptoms of infection or other concerning signs are noted.

## 2023-08-08 ENCOUNTER — PATIENT MESSAGE (OUTPATIENT)
Dept: PRIMARY CARE CLINIC | Facility: CLINIC | Age: 63
End: 2023-08-08
Payer: COMMERCIAL

## 2023-08-09 ENCOUNTER — INFUSION (OUTPATIENT)
Dept: INFECTIOUS DISEASES | Facility: HOSPITAL | Age: 63
End: 2023-08-09
Payer: COMMERCIAL

## 2023-08-09 VITALS
OXYGEN SATURATION: 97 % | SYSTOLIC BLOOD PRESSURE: 152 MMHG | DIASTOLIC BLOOD PRESSURE: 84 MMHG | HEART RATE: 90 BPM | BODY MASS INDEX: 29.75 KG/M2 | HEIGHT: 65 IN | WEIGHT: 178.56 LBS | TEMPERATURE: 98 F | RESPIRATION RATE: 16 BRPM

## 2023-08-09 DIAGNOSIS — M33.13 DERMATOMYOSITIS: Primary | ICD-10-CM

## 2023-08-09 DIAGNOSIS — R13.19 ESOPHAGEAL DYSPHAGIA: ICD-10-CM

## 2023-08-09 PROCEDURE — 96375 TX/PRO/DX INJ NEW DRUG ADDON: CPT

## 2023-08-09 PROCEDURE — 25000003 PHARM REV CODE 250: Performed by: INTERNAL MEDICINE

## 2023-08-09 PROCEDURE — 96365 THER/PROPH/DIAG IV INF INIT: CPT

## 2023-08-09 PROCEDURE — 96366 THER/PROPH/DIAG IV INF ADDON: CPT

## 2023-08-09 PROCEDURE — 63600175 PHARM REV CODE 636 W HCPCS: Mod: JZ,JG | Performed by: INTERNAL MEDICINE

## 2023-08-09 RX ORDER — ACETAMINOPHEN 325 MG/1
650 TABLET ORAL
Status: COMPLETED | OUTPATIENT
Start: 2023-08-09 | End: 2023-08-09

## 2023-08-09 RX ORDER — ACETAMINOPHEN 325 MG/1
650 TABLET ORAL
Status: CANCELLED | OUTPATIENT
Start: 2023-09-06

## 2023-08-09 RX ORDER — FAMOTIDINE 10 MG/ML
20 INJECTION INTRAVENOUS
Status: CANCELLED | OUTPATIENT
Start: 2023-09-06

## 2023-08-09 RX ORDER — HEPARIN 100 UNIT/ML
500 SYRINGE INTRAVENOUS
Status: CANCELLED | OUTPATIENT
Start: 2023-09-06

## 2023-08-09 RX ORDER — SODIUM CHLORIDE 0.9 % (FLUSH) 0.9 %
10 SYRINGE (ML) INJECTION
Status: CANCELLED | OUTPATIENT
Start: 2023-09-06

## 2023-08-09 RX ORDER — SODIUM CHLORIDE 0.9 % (FLUSH) 0.9 %
10 SYRINGE (ML) INJECTION
Status: DISCONTINUED | OUTPATIENT
Start: 2023-08-09 | End: 2023-08-09 | Stop reason: HOSPADM

## 2023-08-09 RX ORDER — FAMOTIDINE 10 MG/ML
20 INJECTION INTRAVENOUS
Status: COMPLETED | OUTPATIENT
Start: 2023-08-09 | End: 2023-08-09

## 2023-08-09 RX ORDER — DIPHENHYDRAMINE HYDROCHLORIDE 50 MG/ML
50 INJECTION INTRAMUSCULAR; INTRAVENOUS
Status: CANCELLED
Start: 2023-09-06

## 2023-08-09 RX ORDER — DIPHENHYDRAMINE HYDROCHLORIDE 50 MG/ML
50 INJECTION INTRAMUSCULAR; INTRAVENOUS
Status: COMPLETED | OUTPATIENT
Start: 2023-08-09 | End: 2023-08-09

## 2023-08-09 RX ORDER — HEPARIN 100 UNIT/ML
500 SYRINGE INTRAVENOUS
Status: DISCONTINUED | OUTPATIENT
Start: 2023-08-09 | End: 2023-08-09 | Stop reason: HOSPADM

## 2023-08-09 RX ADMIN — DIPHENHYDRAMINE HYDROCHLORIDE 50 MG: 50 INJECTION, SOLUTION INTRAMUSCULAR; INTRAVENOUS at 09:08

## 2023-08-09 RX ADMIN — ACETAMINOPHEN 650 MG: 325 TABLET ORAL at 09:08

## 2023-08-09 RX ADMIN — FAMOTIDINE 20 MG: 10 INJECTION, SOLUTION INTRAVENOUS at 09:08

## 2023-08-09 RX ADMIN — SODIUM CHLORIDE: 9 INJECTION, SOLUTION INTRAVENOUS at 09:08

## 2023-08-09 RX ADMIN — HUMAN IMMUNOGLOBULIN G 80 G: 40 LIQUID INTRAVENOUS at 09:08

## 2023-08-09 NOTE — PROGRESS NOTES
Arrived for Privigen infusion. Tylenol 650 mg po, Pepcid 20 mg IVP, and Benadryl 50 IVP administered 30 minutes prior to infusion.  Privigen initiated at the following rates with titration every 30 minutes; 24ml/hr-48ml/hr-97 ml/hr-194 ml/hr, NS  250cc bag @ 25ml/hr concurrently with Privigen. Tolerated without any difficulty.Left unit in NAD.

## 2023-08-11 ENCOUNTER — TELEPHONE (OUTPATIENT)
Dept: ADMINISTRATIVE | Facility: CLINIC | Age: 63
End: 2023-08-11
Payer: COMMERCIAL

## 2023-08-11 NOTE — TELEPHONE ENCOUNTER
Called pt, informed pt I was calling to remind pt of her in office EAWV on 8/14/23; pt stated she needed to cancel the appt because she just started working; pt declined to reschedule at this time and requested a call back in a few weeks; appt canceled per pt's request

## 2023-08-27 ENCOUNTER — OFFICE VISIT (OUTPATIENT)
Dept: URGENT CARE | Facility: CLINIC | Age: 63
End: 2023-08-27
Payer: MEDICARE

## 2023-08-27 ENCOUNTER — PATIENT MESSAGE (OUTPATIENT)
Dept: PRIMARY CARE CLINIC | Facility: CLINIC | Age: 63
End: 2023-08-27
Payer: COMMERCIAL

## 2023-08-27 VITALS
RESPIRATION RATE: 17 BRPM | SYSTOLIC BLOOD PRESSURE: 121 MMHG | BODY MASS INDEX: 29.99 KG/M2 | DIASTOLIC BLOOD PRESSURE: 75 MMHG | OXYGEN SATURATION: 97 % | WEIGHT: 180 LBS | HEART RATE: 80 BPM | HEIGHT: 65 IN | TEMPERATURE: 97 F

## 2023-08-27 DIAGNOSIS — D89.9 IMMUNE DISORDER: ICD-10-CM

## 2023-08-27 DIAGNOSIS — Z85.3 HISTORY OF BREAST CANCER IN FEMALE: ICD-10-CM

## 2023-08-27 DIAGNOSIS — D64.9 ANEMIA, UNSPECIFIED TYPE: ICD-10-CM

## 2023-08-27 DIAGNOSIS — U07.1 COVID-19: Primary | ICD-10-CM

## 2023-08-27 DIAGNOSIS — U07.1 COVID-19 VIRUS DETECTED: ICD-10-CM

## 2023-08-27 DIAGNOSIS — R52 GENERALIZED BODY ACHES: ICD-10-CM

## 2023-08-27 DIAGNOSIS — I10 HYPERTENSION, UNSPECIFIED TYPE: ICD-10-CM

## 2023-08-27 LAB
CTP QC/QA: YES
SARS-COV-2 AG RESP QL IA.RAPID: POSITIVE

## 2023-08-27 PROCEDURE — 87811 SARS-COV-2 COVID19 W/OPTIC: CPT | Mod: QW,S$GLB,, | Performed by: PHYSICIAN ASSISTANT

## 2023-08-27 PROCEDURE — 87811 SARS CORONAVIRUS 2 ANTIGEN POCT, MANUAL READ: ICD-10-PCS | Mod: QW,S$GLB,, | Performed by: PHYSICIAN ASSISTANT

## 2023-08-27 PROCEDURE — 99214 PR OFFICE/OUTPT VISIT, EST, LEVL IV, 30-39 MIN: ICD-10-PCS | Mod: S$GLB,,, | Performed by: PHYSICIAN ASSISTANT

## 2023-08-27 PROCEDURE — 99214 OFFICE O/P EST MOD 30 MIN: CPT | Mod: S$GLB,,, | Performed by: PHYSICIAN ASSISTANT

## 2023-08-27 RX ORDER — PROMETHAZINE HYDROCHLORIDE AND DEXTROMETHORPHAN HYDROBROMIDE 6.25; 15 MG/5ML; MG/5ML
5 SYRUP ORAL EVERY 4 HOURS PRN
Qty: 180 ML | Refills: 0 | Status: SHIPPED | OUTPATIENT
Start: 2023-08-27 | End: 2023-09-06

## 2023-08-27 RX ORDER — DEXTROMETHORPHAN HYDROBROMIDE, GUAIFENESIN, PHENYLEPHRINE HYDROCHLORIDE 17.5; 400; 1 MG/1; MG/1; MG/1
1 TABLET ORAL
Qty: 20 TABLET | Refills: 0 | Status: SHIPPED | OUTPATIENT
Start: 2023-08-27 | End: 2023-09-25 | Stop reason: ALTCHOICE

## 2023-08-27 RX ORDER — IPRATROPIUM BROMIDE 21 UG/1
2 SPRAY, METERED NASAL 2 TIMES DAILY
Qty: 30 ML | Refills: 0 | Status: SHIPPED | OUTPATIENT
Start: 2023-08-27 | End: 2023-12-06 | Stop reason: SDUPTHER

## 2023-08-27 NOTE — PROGRESS NOTES
"Subjective:      Patient ID: Ermelinda Verde is a 62 y.o. female.    Vitals:  height is 5' 5" (1.651 m) and weight is 81.6 kg (180 lb). Her oral temperature is 97.1 °F (36.2 °C). Her blood pressure is 121/75 and her pulse is 80. Her respiration is 17 and oxygen saturation is 97%.     Chief Complaint: Sinus Problem    Pt is coming in with a cough and sinus issues. Pt's symptoms began on Thursday and felt achy. Pt states Friday way the worst. Pt has tried mucinex with mild relief.    Sinus Problem  This is a new problem. The current episode started in the past 7 days. The problem is unchanged. There has been no fever. Her pain is at a severity of 2/10. The pain is mild. Associated symptoms include congestion, coughing, headaches, sinus pressure and a sore throat. Pertinent negatives include no ear pain. Treatments tried: mucinex. The treatment provided mild relief.       HENT:  Positive for congestion, sinus pressure and sore throat. Negative for ear pain.    Respiratory:  Positive for cough.    Neurological:  Positive for headaches.      Objective:     Physical Exam   Constitutional: She is oriented to person, place, and time. She appears well-developed. She is cooperative.  Non-toxic appearance. She does not appear ill. No distress.   HENT:   Head: Normocephalic and atraumatic.   Ears:   Right Ear: Hearing, external ear and ear canal normal. impacted cerumen  Left Ear: Hearing, external ear and ear canal normal. impacted cerumen     Comments: Bilateral clear effusions  Nose: Rhinorrhea and congestion present.   Mouth/Throat: Mucous membranes are moist. No oropharyngeal exudate or posterior oropharyngeal erythema. Oropharynx is clear.   Eyes: Conjunctivae and lids are normal. No scleral icterus.   Neck: Phonation normal. Neck supple.   Cardiovascular: Normal rate, regular rhythm and normal heart sounds.   No murmur heard.Exam reveals no gallop and no friction rub.   Pulmonary/Chest: Effort normal and breath sounds " normal. No stridor. No respiratory distress. She has no wheezes. She has no rhonchi. She has no rales.         Comments: S/p left breast mastectomy    Abdominal: Normal appearance.   Neurological: She is alert and oriented to person, place, and time. Coordination normal.   Skin: Skin is intact, not diaphoretic and not pale.   Psychiatric: Her speech is normal and behavior is normal. Judgment and thought content normal.   Nursing note and vitals reviewed.      Assessment:     1. COVID-19    2. Generalized body aches    3. Immune disorder    4. History of breast cancer in female    5. Anemia, unspecified type    6. Hypertension, unspecified type        Plan:       COVID-19  -     promethazine-dextromethorphan (PROMETHAZINE-DM) 6.25-15 mg/5 mL Syrp; Take 5 mLs by mouth every 4 (four) hours as needed (cough).  Dispense: 180 mL; Refill: 0  -     ipratropium (ATROVENT) 21 mcg (0.03 %) nasal spray; 2 sprays by Each Nostril route 2 (two) times daily.  Dispense: 30 mL; Refill: 0  -     phenylephrine-DM-guaiFENesin (DECONEX DMX) 10-17.5-400 mg Tab; Take 1 tablet by mouth every 4 to 6 hours as needed (congestion/cough).  Dispense: 20 tablet; Refill: 0    Generalized body aches  -     SARS Coronavirus 2 Antigen, POCT Manual Read    Immune disorder    History of breast cancer in female    Anemia, unspecified type    Hypertension, unspecified type      Results for orders placed or performed in visit on 08/27/23   SARS Coronavirus 2 Antigen, POCT Manual Read   Result Value Ref Range    SARS Coronavirus 2 Antigen Positive (A) Negative     Acceptable Yes      *Note: Due to a large number of results and/or encounters for the requested time period, some results have not been displayed. A complete set of results can be found in Results Review.     Alternate ibuprofen and tylenol as needed for fever/pain/body aches every 4-6 hours. Rest, increase PO fluids.   Discussed with patient the importance of f/u with their primary  care provider. Urged to go to the ER for any worsening signs or symptoms.         Medical Decision Making:   Clinical Tests:   Lab Tests: Ordered and Reviewed       <> Summary of Lab: Covid positive

## 2023-08-27 NOTE — LETTER
August 27, 2023      [facility]  [address], [address]  [place] [address]  Phone: [phone]  Fax: [phone]       Patient: Ermelinda Verde   YOB: 1960  Date of Visit: 08/27/2023    To Whom It May Concern:    Herbie Verde  was at [facility] on 08/27/2023. The patient may return to work/school on 08/30/2023 as long as she is fever free and symptoms improve with no restrictions. If you have any questions or concerns, or if I can be of further assistance, please do not hesitate to contact me.    Sincerely,    Kayla Rubio PA-C

## 2023-08-28 ENCOUNTER — NURSE TRIAGE (OUTPATIENT)
Dept: ADMINISTRATIVE | Facility: CLINIC | Age: 63
End: 2023-08-28
Payer: COMMERCIAL

## 2023-08-28 NOTE — TELEPHONE ENCOUNTER
Paxlovid would inc xeljanz levels w/ potential serious side effects. Thus, not indicated for pt at this time. Symptomatic treatment. Please check on pt and give COVID precautions. Thanks!

## 2023-08-28 NOTE — TELEPHONE ENCOUNTER
Was seen in urgent care and is wondering if there is anything else she can do for COVID. Symptoms are about the same.  Reason for Disposition   [1] COVID-19 diagnosed by positive lab test (e.g., PCR, rapid self-test kit) AND [2] mild symptoms (e.g., cough, fever, others) AND [3] no complications or SOB    Additional Information   Negative: SEVERE difficulty breathing (e.g., struggling for each breath, speaks in single words)   Negative: Difficult to awaken or acting confused (e.g., disoriented, slurred speech)   Negative: Bluish (or gray) lips or face now   Negative: Shock suspected (e.g., cold/pale/clammy skin, too weak to stand, low BP, rapid pulse)   Negative: Sounds like a life-threatening emergency to the triager   Negative: [1] Diagnosed or suspected COVID-19 AND [2] symptoms lasting 3 or more weeks   Negative: [1] COVID-19 exposure AND [2] no symptoms   Negative: COVID-19 vaccine reaction suspected (e.g., fever, headache, muscle aches) occurring 1 to 3 days after getting vaccine   Negative: COVID-19 vaccine, questions about   Negative: [1] Lives with someone known to have influenza (flu test positive) AND [2] flu-like symptoms (e.g., cough, runny nose, sore throat, SOB; with or without fever)   Negative: [1] Possible COVID-19 symptoms AND [2] triager concerned about severity of symptoms or other causes   Negative: COVID-19 and breastfeeding, questions about   Negative: SEVERE or constant chest pain or pressure  (Exception: Mild central chest pain, present only when coughing.)   Negative: MODERATE difficulty breathing (e.g., speaks in phrases, SOB even at rest, pulse 100-120)   Negative: [1] Headache AND [2] stiff neck (can't touch chin to chest)   Negative: Oxygen level (e.g., pulse oximetry) 90 percent or lower   Negative: Chest pain or pressure  (Exception: MILD central chest pain, present only when coughing.)   Negative: [1] Drinking very little AND [2] dehydration suspected (e.g., no  urine > 12 hours, very dry mouth, very lightheaded)   Negative: Patient sounds very sick or weak to the triager   Negative: MILD difficulty breathing (e.g., minimal/no SOB at rest, SOB with walking, pulse <100)   Negative: Fever > 103 F (39.4 C)   Negative: [1] Fever > 101 F (38.3 C) AND [2] age > 60 years   Negative: [1] Fever > 100.0 F (37.8 C) AND [2] bedridden (e.g., CVA, chronic illness, recovering from surgery)   Negative: [1] HIGH RISK patient (e.g., weak immune system, age > 64 years, obesity with BMI 30 or higher, pregnant, chronic lung disease or other chronic medical condition) AND [2] COVID symptoms (e.g., cough, fever)  (Exceptions: Already seen by PCP and no new or worsening symptoms.)   Negative: [1] HIGH RISK patient AND [2] influenza is widespread in the community AND [3] ONE OR MORE respiratory symptoms: cough, sore throat, runny or stuffy nose   Negative: [1] HIGH RISK patient AND [2] influenza exposure within the last 7 days AND [3] ONE OR MORE respiratory symptoms: cough, sore throat, runny or stuffy nose   Negative: Fever present > 3 days (72 hours)   Negative: [1] Fever returns after gone for over 24 hours AND [2] symptoms worse or not improved   Negative: [1] Continuous (nonstop) coughing interferes with work or school AND [2] no improvement using cough treatment per Care Advice   Negative: [1] COVID-19 infection suspected by caller or triager AND [2] mild symptoms (cough, fever, or others) AND [3] negative COVID-19 rapid test   Negative: Cough present > 3 weeks   Negative: [1] COVID-19 diagnosed by positive lab test (e.g., PCR, rapid self-test kit) AND [2] NO symptoms (e.g., cough, fever, others)    Protocols used: Coronavirus (COVID-19) Diagnosed or [id number]

## 2023-08-29 NOTE — TELEPHONE ENCOUNTER
Looks like she called nurse on call yesterday. Please check on pt today. Recommend she check w/ rheumatologist Dr. Madera to see if cont IVIG ok next week.

## 2023-09-06 ENCOUNTER — INFUSION (OUTPATIENT)
Dept: INFECTIOUS DISEASES | Facility: HOSPITAL | Age: 63
End: 2023-09-06
Attending: INTERNAL MEDICINE
Payer: COMMERCIAL

## 2023-09-06 VITALS
RESPIRATION RATE: 18 BRPM | HEART RATE: 103 BPM | WEIGHT: 181.19 LBS | OXYGEN SATURATION: 98 % | BODY MASS INDEX: 30.19 KG/M2 | TEMPERATURE: 98 F | SYSTOLIC BLOOD PRESSURE: 151 MMHG | DIASTOLIC BLOOD PRESSURE: 79 MMHG | HEIGHT: 65 IN

## 2023-09-06 DIAGNOSIS — M33.13 DERMATOMYOSITIS: Primary | ICD-10-CM

## 2023-09-06 DIAGNOSIS — R13.19 ESOPHAGEAL DYSPHAGIA: ICD-10-CM

## 2023-09-06 PROCEDURE — 25000003 PHARM REV CODE 250: Performed by: INTERNAL MEDICINE

## 2023-09-06 PROCEDURE — 96366 THER/PROPH/DIAG IV INF ADDON: CPT

## 2023-09-06 PROCEDURE — 96365 THER/PROPH/DIAG IV INF INIT: CPT

## 2023-09-06 PROCEDURE — 63600175 PHARM REV CODE 636 W HCPCS: Performed by: INTERNAL MEDICINE

## 2023-09-06 RX ORDER — HEPARIN 100 UNIT/ML
500 SYRINGE INTRAVENOUS
Status: DISCONTINUED | OUTPATIENT
Start: 2023-09-06 | End: 2023-09-06 | Stop reason: HOSPADM

## 2023-09-06 RX ORDER — SODIUM CHLORIDE 0.9 % (FLUSH) 0.9 %
10 SYRINGE (ML) INJECTION
Status: CANCELLED | OUTPATIENT
Start: 2023-10-04

## 2023-09-06 RX ORDER — HEPARIN 100 UNIT/ML
500 SYRINGE INTRAVENOUS
Status: CANCELLED | OUTPATIENT
Start: 2023-10-04

## 2023-09-06 RX ORDER — SODIUM CHLORIDE 0.9 % (FLUSH) 0.9 %
10 SYRINGE (ML) INJECTION
Status: DISCONTINUED | OUTPATIENT
Start: 2023-09-06 | End: 2023-09-06 | Stop reason: HOSPADM

## 2023-09-06 RX ORDER — FAMOTIDINE 10 MG/ML
20 INJECTION INTRAVENOUS
Status: COMPLETED | OUTPATIENT
Start: 2023-09-06 | End: 2023-09-06

## 2023-09-06 RX ORDER — DIPHENHYDRAMINE HYDROCHLORIDE 50 MG/ML
50 INJECTION INTRAMUSCULAR; INTRAVENOUS
Status: CANCELLED
Start: 2023-10-04

## 2023-09-06 RX ORDER — ACETAMINOPHEN 325 MG/1
650 TABLET ORAL
Status: CANCELLED | OUTPATIENT
Start: 2023-10-04

## 2023-09-06 RX ORDER — DIPHENHYDRAMINE HYDROCHLORIDE 50 MG/ML
50 INJECTION INTRAMUSCULAR; INTRAVENOUS
Status: COMPLETED | OUTPATIENT
Start: 2023-09-06 | End: 2023-09-06

## 2023-09-06 RX ORDER — FAMOTIDINE 10 MG/ML
20 INJECTION INTRAVENOUS
Status: CANCELLED | OUTPATIENT
Start: 2023-10-04

## 2023-09-06 RX ORDER — ACETAMINOPHEN 325 MG/1
650 TABLET ORAL
Status: COMPLETED | OUTPATIENT
Start: 2023-09-06 | End: 2023-09-06

## 2023-09-06 RX ADMIN — FAMOTIDINE 20 MG: 10 INJECTION, SOLUTION INTRAVENOUS at 08:09

## 2023-09-06 RX ADMIN — ACETAMINOPHEN 650 MG: 325 TABLET ORAL at 08:09

## 2023-09-06 RX ADMIN — DIPHENHYDRAMINE HYDROCHLORIDE 50 MG: 50 INJECTION INTRAMUSCULAR; INTRAVENOUS at 08:09

## 2023-09-06 RX ADMIN — HUMAN IMMUNOGLOBULIN G 80 G: 40 LIQUID INTRAVENOUS at 09:09

## 2023-09-06 RX ADMIN — SODIUM CHLORIDE: 9 INJECTION, SOLUTION INTRAVENOUS at 08:09

## 2023-09-06 NOTE — PROGRESS NOTES
Arrived for Privigen infusion. Tylenol 650 mg po, Pepcid 20 mg IVP, and Benadryl 50 IVP administered 30 minutes prior to infusion.  Privigen initiated at the following rates with titration every 30 minutes; 25ml/hr-49ml/hr-99 ml/hr-197 ml/hr, NS  250cc bag @ 25ml/hr concurrently with Privigen. Tolerated without any difficulty.

## 2023-09-06 NOTE — LETTER
"Ermelinda Martinez" Saialeenapaolo was seen and treated in our Infusion Suite on 9/6/2023.  She may return to work on 9.7.2023.      If you have any questions or concerns, please don't hesitate to call.          Angela Obregon"

## 2023-09-06 NOTE — PLAN OF CARE
Patient counseled on infection prevention by excellent hand hygiene - verbalized understanding    Patient counseled on fall risk assessment and prevention at home and in public - verbalized understanding    Patient counseled on monitoring and reporting pain to provider - verbalized understanding

## 2023-09-08 NOTE — PLAN OF CARE
1241-Patient tolerated treatment well. Discharged without complaints or S/S of adverse event. AVS given.  Port left accessed for treatment tomorrow, patient verbalized will return for day two of treatment in the morning. Instructed to call provider for any questions or concerns.      35

## 2023-09-11 ENCOUNTER — PATIENT MESSAGE (OUTPATIENT)
Dept: ADMINISTRATIVE | Facility: HOSPITAL | Age: 63
End: 2023-09-11
Payer: COMMERCIAL

## 2023-09-19 DIAGNOSIS — M33.13 DERMATOMYOSITIS: Primary | ICD-10-CM

## 2023-09-20 RX ORDER — TOFACITINIB 5 MG/1
TABLET, FILM COATED ORAL
Qty: 60 TABLET | Refills: 0 | Status: SHIPPED | OUTPATIENT
Start: 2023-09-20 | End: 2023-10-20

## 2023-09-22 ENCOUNTER — PATIENT MESSAGE (OUTPATIENT)
Dept: RHEUMATOLOGY | Facility: CLINIC | Age: 63
End: 2023-09-22
Payer: COMMERCIAL

## 2023-09-22 ENCOUNTER — PATIENT MESSAGE (OUTPATIENT)
Dept: ADMINISTRATIVE | Facility: CLINIC | Age: 63
End: 2023-09-22
Payer: COMMERCIAL

## 2023-09-22 ENCOUNTER — TELEPHONE (OUTPATIENT)
Dept: ADMINISTRATIVE | Facility: CLINIC | Age: 63
End: 2023-09-22
Payer: COMMERCIAL

## 2023-09-25 ENCOUNTER — OFFICE VISIT (OUTPATIENT)
Dept: FAMILY MEDICINE | Facility: CLINIC | Age: 63
End: 2023-09-25
Payer: MEDICARE

## 2023-09-25 ENCOUNTER — TELEPHONE (OUTPATIENT)
Dept: ADMINISTRATIVE | Facility: CLINIC | Age: 63
End: 2023-09-25
Payer: COMMERCIAL

## 2023-09-25 VITALS — WEIGHT: 181 LBS | BODY MASS INDEX: 30.16 KG/M2 | HEIGHT: 65 IN

## 2023-09-25 DIAGNOSIS — M33.20 POLYMYOSITIS ASSOCIATED WITH AUTOIMMUNE DISEASE: ICD-10-CM

## 2023-09-25 DIAGNOSIS — D64.9 ANEMIA, UNSPECIFIED TYPE: ICD-10-CM

## 2023-09-25 DIAGNOSIS — K21.00 GASTROESOPHAGEAL REFLUX DISEASE WITH ESOPHAGITIS WITHOUT HEMORRHAGE: ICD-10-CM

## 2023-09-25 DIAGNOSIS — I70.0 AORTIC ATHEROSCLEROSIS: ICD-10-CM

## 2023-09-25 DIAGNOSIS — M35.9 POLYMYOSITIS ASSOCIATED WITH AUTOIMMUNE DISEASE: ICD-10-CM

## 2023-09-25 DIAGNOSIS — I73.9 PAD (PERIPHERAL ARTERY DISEASE): ICD-10-CM

## 2023-09-25 DIAGNOSIS — Z85.3 HISTORY OF BREAST CANCER IN FEMALE: ICD-10-CM

## 2023-09-25 DIAGNOSIS — D84.821 IMMUNOSUPPRESSION DUE TO DRUG THERAPY: ICD-10-CM

## 2023-09-25 DIAGNOSIS — T45.1X5A CHEMOTHERAPY-INDUCED NEUROPATHY: ICD-10-CM

## 2023-09-25 DIAGNOSIS — G62.0 CHEMOTHERAPY-INDUCED NEUROPATHY: ICD-10-CM

## 2023-09-25 DIAGNOSIS — E11.9 CONTROLLED TYPE 2 DIABETES MELLITUS WITHOUT COMPLICATION, WITHOUT LONG-TERM CURRENT USE OF INSULIN: ICD-10-CM

## 2023-09-25 DIAGNOSIS — M33.13 DERMATOMYOSITIS: ICD-10-CM

## 2023-09-25 DIAGNOSIS — Z79.899 IMMUNOSUPPRESSION DUE TO DRUG THERAPY: ICD-10-CM

## 2023-09-25 DIAGNOSIS — Z00.00 ENCOUNTER FOR PREVENTIVE HEALTH EXAMINATION: Primary | ICD-10-CM

## 2023-09-25 PROCEDURE — 3060F POS MICROALBUMINURIA REV: CPT | Mod: CPTII,95,,

## 2023-09-25 PROCEDURE — 3066F PR DOCUMENTATION OF TREATMENT FOR NEPHROPATHY: ICD-10-PCS | Mod: CPTII,95,,

## 2023-09-25 PROCEDURE — 1160F PR REVIEW ALL MEDS BY PRESCRIBER/CLIN PHARMACIST DOCUMENTED: ICD-10-PCS | Mod: CPTII,95,,

## 2023-09-25 PROCEDURE — 3008F BODY MASS INDEX DOCD: CPT | Mod: CPTII,95,,

## 2023-09-25 PROCEDURE — G0439 PR MEDICARE ANNUAL WELLNESS SUBSEQUENT VISIT: ICD-10-PCS | Mod: 95,,,

## 2023-09-25 PROCEDURE — 3060F PR POS MICROALBUMINURIA RESULT DOCUMENTED/REVIEW: ICD-10-PCS | Mod: CPTII,95,,

## 2023-09-25 PROCEDURE — 3044F PR MOST RECENT HEMOGLOBIN A1C LEVEL <7.0%: ICD-10-PCS | Mod: CPTII,95,,

## 2023-09-25 PROCEDURE — 1159F PR MEDICATION LIST DOCUMENTED IN MEDICAL RECORD: ICD-10-PCS | Mod: CPTII,95,,

## 2023-09-25 PROCEDURE — 1160F RVW MEDS BY RX/DR IN RCRD: CPT | Mod: CPTII,95,,

## 2023-09-25 PROCEDURE — G0439 PPPS, SUBSEQ VISIT: HCPCS | Mod: 95,,,

## 2023-09-25 PROCEDURE — 3066F NEPHROPATHY DOC TX: CPT | Mod: CPTII,95,,

## 2023-09-25 PROCEDURE — 3008F PR BODY MASS INDEX (BMI) DOCUMENTED: ICD-10-PCS | Mod: CPTII,95,,

## 2023-09-25 PROCEDURE — 3044F HG A1C LEVEL LT 7.0%: CPT | Mod: CPTII,95,,

## 2023-09-25 PROCEDURE — 1159F MED LIST DOCD IN RCRD: CPT | Mod: CPTII,95,,

## 2023-09-25 NOTE — PATIENT INSTRUCTIONS
Counseling and Referral of Other Preventative  (Italic type indicates deductible and co-insurance are waived)    Patient Name: Ermelinda Verde  Today's Date: 9/25/2023    Health Maintenance       Date Due Completion Date    Mammogram 12/05/2023 12/5/2022    Influenza Vaccine (1) 11/30/2023 (Originally 9/1/2023) 9/21/2022    COVID-19 Vaccine (5 - Moderna risk series) 11/30/2023 (Originally 11/16/2022) 9/21/2022    Hemoglobin A1c (Prediabetes) 05/16/2024 5/16/2023    High Dose Statin 08/27/2024 8/27/2023    DEXA Scan 10/25/2024 10/25/2022    Pneumococcal Vaccines (Age 0-64) (3 - PPSV23 or PCV20) 01/27/2025 1/27/2020    Cervical Cancer Screening 05/17/2026 5/17/2023    Lipid Panel 07/20/2027 7/20/2022    TETANUS VACCINE 03/08/2028 3/8/2018    Colorectal Cancer Screening 07/29/2029 7/29/2019    Override on 11/8/2011: Done    Override on 1/1/2011: Done        No orders of the defined types were placed in this encounter.    The following information is provided to all patients.  This information is to help you find resources for any of the problems found today that may be affecting your health:                Living healthy guide: www.[facility].[place].gov      Understanding Diabetes: www.diabetes.org      Eating healthy: www.cdc.gov/healthyweight      CDC home safety checklist: www.cdc.gov/steadi/patient.html      Agency on Aging: www.goea.[place].[facility]      Alcoholics anonymous (AA): www.aa.org      Physical Activity: www.allen.nih.gov/gu1vueb      Tobacco use: www.quitwithusla.org

## 2023-09-25 NOTE — TELEPHONE ENCOUNTER
Attempted to reach out to patient to schedule f.u. No answer Left voicemail; appt scheduled and mailed to patient.     Thank you,   Shahrzad

## 2023-09-25 NOTE — PROGRESS NOTES
"The patient location is: [place]  The chief complaint leading to consultation is: Medicare AWV    Visit type: audiovisual    Face to Face time with patient: 30  60 minutes of total time spent on the encounter, which includes face to face time and non-face to face time preparing to see the patient (eg, review of tests), Obtaining and/or reviewing separately obtained history, Documenting clinical information in the electronic or other health record, Independently interpreting results (not separately reported) and communicating results to the patient/family/caregiver, or Care coordination (not separately reported).         Each patient to whom he or she provides medical services by telemedicine is:  (1) informed of the relationship between the physician and patient and the respective role of any other health care provider with respect to management of the patient; and (2) notified that he or she may decline to receive medical services by telemedicine and may withdraw from such care at any time.    Notes:       Ermelinda Verde presented for a  Medicare AWV and comprehensive Health Risk Assessment today. The following components were reviewed and updated:    Medical history  Family History  Social history  Allergies and Current Medications  Health Risk Assessment  Health Maintenance  Care Team         ** See Completed Assessments for Annual Wellness Visit within the encounter summary.**         The following assessments were completed:  Living Situation  CAGE  Depression Screening  Fall Risk Assessment (MACH 10)  Hearing Assessment(HHI)  Cognitive Function Screening  Nutrition Screening  ADL Screening  PAQ Screening      Vitals:    09/25/23 1502   Weight: 82.1 kg (181 lb)   Height: 5' 5" (1.651 m)     Body mass index is 30.12 kg/m².  Physical Exam  Constitutional:       General: She is not in acute distress.     Appearance: Normal appearance. She is well-developed and well-groomed.   Skin:     Coloration: Skin is not " pale.   Neurological:      Mental Status: She is alert and oriented to person, place, and time.   Psychiatric:         Mood and Affect: Mood normal.         Speech: Speech normal.         Behavior: Behavior normal. Behavior is cooperative.         Thought Content: Thought content normal.               Diagnoses and health risks identified today and associated recommendations/orders:    1. Encounter for preventive health examination  Screenings performed, as noted above. Personal preventative testing needs reviewed.     2. Aortic atherosclerosis  Chronic; stable on medication. Followed by PCP.    3. PAD (peripheral artery disease)  Chronic; stable. Followed by PCP.    4. Polymyositis associated with autoimmune disease  5. Immunosuppression due to drug therapy  6. Dermatomyositis  Chronic; stable on medication. Followed by Rheumatology.     7. Chemotherapy-induced neuropathy  Chronic; stable. Followed by Oncology.     8. Controlled type 2 diabetes mellitus without complication, without long-term current use of insulin  Chronic; stable. Followed by PCP.    9. Anemia, unspecified type  Chronic; stable. Followed by PCP.     10. Gastroesophageal reflux disease with esophagitis without hemorrhage  Chronic; stable. Followed by PCP.     11. History of breast cancer in female  Followed by Oncology.       Review for Opioid Screening: Patient does not have rx for Opioids.    Review for Substance Use Disorders: Patient does not use substance.    Provided Devery with a 5-10 year written screening schedule and personal prevention plan. Recommendations were developed using the USPSTF age appropriate recommendations. Education, counseling, and referrals were provided as needed. After Visit Summary printed and given to patient which includes a list of additional screenings\tests needed.    Follow up in about 1 year (around 9/25/2024) for your next annual wellness visit.    Yareli Ramirez NP      Advance Care Planning     I offered  to discuss advanced care planning, including how to pick a person who would make decisions for you if you were unable to make them for yourself, called a health care power of , and what kind of decisions you might make such as use of life sustaining treatments such as ventilators and tube feeding when faced with a life limiting illness recorded on a living will that they will need to know. (How you want to be cared for as you near the end of your natural life)     X Patient is interested in learning more about how to make advanced directives.  I provided them paperwork and offered to discuss this with them.

## 2023-09-28 ENCOUNTER — OFFICE VISIT (OUTPATIENT)
Dept: PRIMARY CARE CLINIC | Facility: CLINIC | Age: 63
End: 2023-09-28
Payer: MEDICARE

## 2023-09-28 ENCOUNTER — LAB VISIT (OUTPATIENT)
Dept: LAB | Facility: HOSPITAL | Age: 63
End: 2023-09-28
Attending: INTERNAL MEDICINE
Payer: COMMERCIAL

## 2023-09-28 VITALS
SYSTOLIC BLOOD PRESSURE: 138 MMHG | OXYGEN SATURATION: 100 % | HEART RATE: 103 BPM | WEIGHT: 178.56 LBS | DIASTOLIC BLOOD PRESSURE: 74 MMHG | TEMPERATURE: 98 F | HEIGHT: 65 IN | BODY MASS INDEX: 29.75 KG/M2

## 2023-09-28 DIAGNOSIS — E11.29 CONTROLLED TYPE 2 DIABETES MELLITUS WITH MICROALBUMINURIA, WITHOUT LONG-TERM CURRENT USE OF INSULIN: Primary | ICD-10-CM

## 2023-09-28 DIAGNOSIS — E87.6 HYPOKALEMIA: ICD-10-CM

## 2023-09-28 DIAGNOSIS — R80.9 CONTROLLED TYPE 2 DIABETES MELLITUS WITH MICROALBUMINURIA, WITHOUT LONG-TERM CURRENT USE OF INSULIN: ICD-10-CM

## 2023-09-28 DIAGNOSIS — E78.5 HYPERLIPIDEMIA, UNSPECIFIED HYPERLIPIDEMIA TYPE: ICD-10-CM

## 2023-09-28 DIAGNOSIS — I70.0 AORTIC ATHEROSCLEROSIS: ICD-10-CM

## 2023-09-28 DIAGNOSIS — I10 BENIGN ESSENTIAL HYPERTENSION: ICD-10-CM

## 2023-09-28 DIAGNOSIS — K86.9 PANCREATIC LESION: ICD-10-CM

## 2023-09-28 DIAGNOSIS — R74.01 ELEVATED AST (SGOT): ICD-10-CM

## 2023-09-28 DIAGNOSIS — Z12.31 ENCOUNTER FOR SCREENING MAMMOGRAM FOR MALIGNANT NEOPLASM OF BREAST: ICD-10-CM

## 2023-09-28 DIAGNOSIS — E11.29 CONTROLLED TYPE 2 DIABETES MELLITUS WITH MICROALBUMINURIA, WITHOUT LONG-TERM CURRENT USE OF INSULIN: ICD-10-CM

## 2023-09-28 DIAGNOSIS — R80.9 CONTROLLED TYPE 2 DIABETES MELLITUS WITH MICROALBUMINURIA, WITHOUT LONG-TERM CURRENT USE OF INSULIN: Primary | ICD-10-CM

## 2023-09-28 LAB
ALBUMIN SERPL BCP-MCNC: 4.3 G/DL (ref 3.5–5.2)
ALP SERPL-CCNC: 102 U/L (ref 55–135)
ALT SERPL W/O P-5'-P-CCNC: 25 U/L (ref 10–44)
ANION GAP SERPL CALC-SCNC: 11 MMOL/L (ref 8–16)
AST SERPL-CCNC: 31 U/L (ref 10–40)
BILIRUB SERPL-MCNC: 0.2 MG/DL (ref 0.1–1)
BUN SERPL-MCNC: 12 MG/DL (ref 8–23)
CALCIUM SERPL-MCNC: 10.3 MG/DL (ref 8.7–10.5)
CHLORIDE SERPL-SCNC: 101 MMOL/L (ref 95–110)
CHOLEST SERPL-MCNC: 192 MG/DL (ref 120–199)
CHOLEST/HDLC SERPL: 2.7 {RATIO} (ref 2–5)
CO2 SERPL-SCNC: 26 MMOL/L (ref 23–29)
CREAT SERPL-MCNC: 0.9 MG/DL (ref 0.5–1.4)
EST. GFR  (NO RACE VARIABLE): >60 ML/MIN/1.73 M^2
ESTIMATED AVG GLUCOSE: 131 MG/DL (ref 68–131)
GLUCOSE SERPL-MCNC: 104 MG/DL (ref 70–110)
HBA1C MFR BLD: 6.2 % (ref 4–5.6)
HDLC SERPL-MCNC: 70 MG/DL (ref 40–75)
HDLC SERPL: 36.5 % (ref 20–50)
LDLC SERPL CALC-MCNC: 102 MG/DL (ref 63–159)
MAGNESIUM SERPL-MCNC: 2 MG/DL (ref 1.6–2.6)
NONHDLC SERPL-MCNC: 122 MG/DL
POTASSIUM SERPL-SCNC: 3.5 MMOL/L (ref 3.5–5.1)
PROT SERPL-MCNC: 10.3 G/DL (ref 6–8.4)
SODIUM SERPL-SCNC: 138 MMOL/L (ref 136–145)
TRIGL SERPL-MCNC: 100 MG/DL (ref 30–150)

## 2023-09-28 PROCEDURE — 99214 PR OFFICE/OUTPT VISIT, EST, LEVL IV, 30-39 MIN: ICD-10-PCS | Mod: S$GLB,,, | Performed by: INTERNAL MEDICINE

## 2023-09-28 PROCEDURE — 3078F DIAST BP <80 MM HG: CPT | Mod: CPTII,S$GLB,, | Performed by: INTERNAL MEDICINE

## 2023-09-28 PROCEDURE — 99999 PR PBB SHADOW E&M-EST. PATIENT-LVL V: CPT | Mod: PBBFAC,,, | Performed by: INTERNAL MEDICINE

## 2023-09-28 PROCEDURE — 3060F POS MICROALBUMINURIA REV: CPT | Mod: CPTII,S$GLB,, | Performed by: INTERNAL MEDICINE

## 2023-09-28 PROCEDURE — 3044F HG A1C LEVEL LT 7.0%: CPT | Mod: CPTII,S$GLB,, | Performed by: INTERNAL MEDICINE

## 2023-09-28 PROCEDURE — 1159F PR MEDICATION LIST DOCUMENTED IN MEDICAL RECORD: ICD-10-PCS | Mod: CPTII,S$GLB,, | Performed by: INTERNAL MEDICINE

## 2023-09-28 PROCEDURE — 83735 ASSAY OF MAGNESIUM: CPT | Performed by: INTERNAL MEDICINE

## 2023-09-28 PROCEDURE — 36415 COLL VENOUS BLD VENIPUNCTURE: CPT | Mod: PN | Performed by: INTERNAL MEDICINE

## 2023-09-28 PROCEDURE — 3008F PR BODY MASS INDEX (BMI) DOCUMENTED: ICD-10-PCS | Mod: CPTII,S$GLB,, | Performed by: INTERNAL MEDICINE

## 2023-09-28 PROCEDURE — 3066F PR DOCUMENTATION OF TREATMENT FOR NEPHROPATHY: ICD-10-PCS | Mod: CPTII,S$GLB,, | Performed by: INTERNAL MEDICINE

## 2023-09-28 PROCEDURE — 3066F NEPHROPATHY DOC TX: CPT | Mod: CPTII,S$GLB,, | Performed by: INTERNAL MEDICINE

## 2023-09-28 PROCEDURE — 3078F PR MOST RECENT DIASTOLIC BLOOD PRESSURE < 80 MM HG: ICD-10-PCS | Mod: CPTII,S$GLB,, | Performed by: INTERNAL MEDICINE

## 2023-09-28 PROCEDURE — 1159F MED LIST DOCD IN RCRD: CPT | Mod: CPTII,S$GLB,, | Performed by: INTERNAL MEDICINE

## 2023-09-28 PROCEDURE — 3060F PR POS MICROALBUMINURIA RESULT DOCUMENTED/REVIEW: ICD-10-PCS | Mod: CPTII,S$GLB,, | Performed by: INTERNAL MEDICINE

## 2023-09-28 PROCEDURE — 1160F RVW MEDS BY RX/DR IN RCRD: CPT | Mod: CPTII,S$GLB,, | Performed by: INTERNAL MEDICINE

## 2023-09-28 PROCEDURE — 80053 COMPREHEN METABOLIC PANEL: CPT | Performed by: INTERNAL MEDICINE

## 2023-09-28 PROCEDURE — 3008F BODY MASS INDEX DOCD: CPT | Mod: CPTII,S$GLB,, | Performed by: INTERNAL MEDICINE

## 2023-09-28 PROCEDURE — 3075F SYST BP GE 130 - 139MM HG: CPT | Mod: CPTII,S$GLB,, | Performed by: INTERNAL MEDICINE

## 2023-09-28 PROCEDURE — 99999 PR PBB SHADOW E&M-EST. PATIENT-LVL V: ICD-10-PCS | Mod: PBBFAC,,, | Performed by: INTERNAL MEDICINE

## 2023-09-28 PROCEDURE — 99214 OFFICE O/P EST MOD 30 MIN: CPT | Mod: S$GLB,,, | Performed by: INTERNAL MEDICINE

## 2023-09-28 PROCEDURE — 83036 HEMOGLOBIN GLYCOSYLATED A1C: CPT | Performed by: INTERNAL MEDICINE

## 2023-09-28 PROCEDURE — 1160F PR REVIEW ALL MEDS BY PRESCRIBER/CLIN PHARMACIST DOCUMENTED: ICD-10-PCS | Mod: CPTII,S$GLB,, | Performed by: INTERNAL MEDICINE

## 2023-09-28 PROCEDURE — 80061 LIPID PANEL: CPT | Performed by: INTERNAL MEDICINE

## 2023-09-28 PROCEDURE — 3075F PR MOST RECENT SYSTOLIC BLOOD PRESS GE 130-139MM HG: ICD-10-PCS | Mod: CPTII,S$GLB,, | Performed by: INTERNAL MEDICINE

## 2023-09-28 PROCEDURE — 3044F PR MOST RECENT HEMOGLOBIN A1C LEVEL <7.0%: ICD-10-PCS | Mod: CPTII,S$GLB,, | Performed by: INTERNAL MEDICINE

## 2023-09-28 NOTE — PATIENT INSTRUCTIONS
Flu vaccine at local pharmacy.  COVID vaccine in November since you had COVID in August.    Labs today.     Schedule mammogram for December.    Start checking blood pressures at home daily and send me the blood pressure readings in a week. Goal blood pressure is closer to 130/80 or less.

## 2023-09-28 NOTE — PROGRESS NOTES
"Subjective:       Patient ID: Ermelinda Verde is a 62 y.o. female.    Chief Complaint: Diabetes    HPI  MMG due in Dec     DM2 - diet controlled.  Last a1c 5/16/23 6.2.   MAC 5/16/23 70.6    Recent CMP a week ago w/ mildly low potassium and borderline AST at 48.  Started OTC potassium 99mg.     HTN - Procardia XL 30mg daily. Pt reports has still been taking her chlorthalidone 25mg at home till a week ago and stopped her potassium.     HLD - crestor 10mg daily. Due to repeat lipids.   Aortic atherosclerosis.     Stable 0.3cm lesion pancreatic tail on CT abd/pelvis 2023 compared to 2022 and 2021.   On CT 2023, caught endometrial thickening. Pt couldn't get soon appt at [facility] so should've followed up w/ outside OB/GYN for further eval - Dr. Dee Dee Shepherd in [place]. Pt reports had biopsy and "everything is fine"    Review of Systems   Constitutional:  Negative for chills and fever.   HENT:  Negative for congestion, postnasal drip, rhinorrhea and sinus pain.    Eyes:  Negative for visual disturbance.   Respiratory:  Negative for cough, shortness of breath and wheezing.    Cardiovascular:  Negative for chest pain, palpitations and leg swelling.   Gastrointestinal:  Positive for constipation (resolved w/ miralax. didn't feel like benefiber helped anymore.). Negative for abdominal pain, blood in stool, diarrhea, nausea and vomiting.   Genitourinary:  Negative for dysuria and frequency.   Musculoskeletal:  Positive for arthralgias and myalgias.   Skin:  Negative for rash.   Neurological:  Negative for dizziness, weakness, light-headedness and numbness.   Psychiatric/Behavioral:  Negative for dysphoric mood. The patient is not nervous/anxious.          Objective:      Physical Exam    /74 (BP Location: Right arm, Patient Position: Sitting, BP Method: Large (Manual))   Pulse 103   Temp 98.4 °F (36.9 °C) (Oral)   Ht 5' 5" (1.651 m)   Wt 81 kg (178 lb 9.2 oz)   LMP  (LMP Unknown)   SpO2 100%   BMI 29.72 kg/m² "     GEN - A+OX4, NAD   HEENT - PERRL, EOMI, OP clear. MMM.   Neck - No thyromegaly or cervical LAD. No thyroid masses felt.  CV - RRR, no m/r   Chest - CTAB, no wheezing or rhonchi  Abd - S/NT/ND/+BS.   Ext - no LE edema. 2+ radial pulses.  Skin - no rash. Taut skin.   MSK - no spinal tenderness to palpation.     Previous labs reviewed.     Assessment/Plan     Ermelinda was seen today for diabetes.    Diagnoses and all orders for this visit:    Controlled type 2 diabetes mellitus with microalbuminuria, without long-term current use of insulin - diet controlled.   -     Hemoglobin A1C; Future  -     Microalbumin/Creatinine Ratio, Urine; Future    Benign essential hypertension - pt was apparently still taking chlorthalidone. Stopped a week ago when she saw that her K was low. Rec to check BP at home daily and to let us know what it's running at home. Goal closer to 130/80.   -     Magnesium; Future    Hyperlipidemia, unspecified hyperlipidemia type - cont crestor 10.   -     Lipid Panel; Future  -     Comprehensive Metabolic Panel; Future    Aortic atherosclerosis - cont crestor 10.  -     Lipid Panel; Future  -     Comprehensive Metabolic Panel; Future    Encounter for screening mammogram for malignant neoplasm of breast  -     Mammo Digital Screening Bilat w/ Ashu; Future    Hypokalemia - pt stopped chlorthalidone. Started OTC potassium. Will check K to make sure ok.   -     Magnesium; Future  -     Comprehensive Metabolic Panel; Future    Elevated AST (SGOT) - liver ok on most recent CT. Will recheck AST. Was only mildly elevated.   -     Comprehensive Metabolic Panel; Future    Pancreatic lesion - stable on imaging x 3 yrs. Repeat next yr.    Flu vaccine at local pharmacy.  COVID vaccine in November since you had COVID in August.    Labs today.     Schedule mammogram for December.    Start checking blood pressures at home daily and send me the blood pressure readings in a week. Goal blood pressure is closer to 130/80  or less.     Advance Care Planning     Date: 09/28/2023    Power of   I initiated the process of voluntary advance care planning today and explained the importance of this process to the patient.  I introduced the concept of advance directives to the patient, as well. Then the patient received detailed information about the importance of designating a Health Care Power of  (HCPOA). She was also instructed to communicate with this person about their wishes for future healthcare, should she become sick and lose decision-making capacity. The patient has previously appointed a HCPOA. After our discussion, the patient has decided to complete a HCPOA and has appointed her significant other, health care agent:  Júnior  & health care agent number:  [phone]  I spent a total time of 5 minutes discussing this issue with the patient.       Follow up in about 5 months (around 2/28/2024).      Reta Weeks MD  Department of Internal Medicine - [facility]  2:29 PM

## 2023-09-29 ENCOUNTER — TELEPHONE (OUTPATIENT)
Dept: PRIMARY CARE CLINIC | Facility: CLINIC | Age: 63
End: 2023-09-29
Payer: COMMERCIAL

## 2023-09-29 DIAGNOSIS — E11.29 MICROALBUMINURIA DUE TO TYPE 2 DIABETES MELLITUS: Primary | ICD-10-CM

## 2023-09-29 DIAGNOSIS — R80.9 MICROALBUMINURIA DUE TO TYPE 2 DIABETES MELLITUS: Primary | ICD-10-CM

## 2023-09-29 NOTE — TELEPHONE ENCOUNTER
Please call and notify pt:  There's a little bit of protein spilling in the urine. Given the history of diabetes and her blood pressure being borderline at the visit, I would like to start a medicine called olmesartan 5mg daily. This medicine helps to protect the kidneys and also help to retain potassium. If she does decide to take this, can likely get off of her potassium supplement over the counter. Let me know what she thinks.

## 2023-09-30 ENCOUNTER — PATIENT MESSAGE (OUTPATIENT)
Dept: RHEUMATOLOGY | Facility: CLINIC | Age: 63
End: 2023-09-30
Payer: COMMERCIAL

## 2023-10-04 ENCOUNTER — INFUSION (OUTPATIENT)
Dept: INFECTIOUS DISEASES | Facility: HOSPITAL | Age: 63
End: 2023-10-04
Attending: FAMILY MEDICINE
Payer: COMMERCIAL

## 2023-10-04 VITALS
DIASTOLIC BLOOD PRESSURE: 81 MMHG | HEART RATE: 99 BPM | BODY MASS INDEX: 30.18 KG/M2 | SYSTOLIC BLOOD PRESSURE: 153 MMHG | OXYGEN SATURATION: 99 % | HEIGHT: 65 IN | TEMPERATURE: 98 F | WEIGHT: 181.13 LBS | RESPIRATION RATE: 16 BRPM

## 2023-10-04 DIAGNOSIS — R13.19 ESOPHAGEAL DYSPHAGIA: ICD-10-CM

## 2023-10-04 DIAGNOSIS — M33.13 DERMATOMYOSITIS: Primary | ICD-10-CM

## 2023-10-04 PROCEDURE — 25000003 PHARM REV CODE 250: Performed by: INTERNAL MEDICINE

## 2023-10-04 PROCEDURE — 96366 THER/PROPH/DIAG IV INF ADDON: CPT

## 2023-10-04 PROCEDURE — 96365 THER/PROPH/DIAG IV INF INIT: CPT

## 2023-10-04 PROCEDURE — 96376 TX/PRO/DX INJ SAME DRUG ADON: CPT

## 2023-10-04 PROCEDURE — 63600175 PHARM REV CODE 636 W HCPCS: Mod: JZ,JG | Performed by: INTERNAL MEDICINE

## 2023-10-04 RX ORDER — SODIUM CHLORIDE 0.9 % (FLUSH) 0.9 %
10 SYRINGE (ML) INJECTION
Status: CANCELLED | OUTPATIENT
Start: 2023-11-01

## 2023-10-04 RX ORDER — HEPARIN 100 UNIT/ML
500 SYRINGE INTRAVENOUS
Status: CANCELLED | OUTPATIENT
Start: 2023-11-01

## 2023-10-04 RX ORDER — SODIUM CHLORIDE 0.9 % (FLUSH) 0.9 %
10 SYRINGE (ML) INJECTION
Status: DISCONTINUED | OUTPATIENT
Start: 2023-10-04 | End: 2023-10-04 | Stop reason: HOSPADM

## 2023-10-04 RX ORDER — FAMOTIDINE 10 MG/ML
20 INJECTION INTRAVENOUS
Status: CANCELLED | OUTPATIENT
Start: 2023-11-01

## 2023-10-04 RX ORDER — ACETAMINOPHEN 325 MG/1
650 TABLET ORAL
Status: COMPLETED | OUTPATIENT
Start: 2023-10-04 | End: 2023-10-04

## 2023-10-04 RX ORDER — DIPHENHYDRAMINE HYDROCHLORIDE 50 MG/ML
50 INJECTION INTRAMUSCULAR; INTRAVENOUS
Status: COMPLETED | OUTPATIENT
Start: 2023-10-04 | End: 2023-10-04

## 2023-10-04 RX ORDER — HEPARIN 100 UNIT/ML
500 SYRINGE INTRAVENOUS
Status: DISCONTINUED | OUTPATIENT
Start: 2023-10-04 | End: 2023-10-04 | Stop reason: HOSPADM

## 2023-10-04 RX ORDER — DIPHENHYDRAMINE HYDROCHLORIDE 50 MG/ML
50 INJECTION INTRAMUSCULAR; INTRAVENOUS
Status: CANCELLED
Start: 2023-11-01

## 2023-10-04 RX ORDER — ACETAMINOPHEN 325 MG/1
650 TABLET ORAL
Status: CANCELLED | OUTPATIENT
Start: 2023-11-01

## 2023-10-04 RX ORDER — FAMOTIDINE 10 MG/ML
20 INJECTION INTRAVENOUS
Status: COMPLETED | OUTPATIENT
Start: 2023-10-04 | End: 2023-10-04

## 2023-10-04 RX ADMIN — ACETAMINOPHEN 650 MG: 325 TABLET ORAL at 08:10

## 2023-10-04 RX ADMIN — SODIUM CHLORIDE: 9 INJECTION, SOLUTION INTRAVENOUS at 08:10

## 2023-10-04 RX ADMIN — HUMAN IMMUNOGLOBULIN G 80 G: 40 LIQUID INTRAVENOUS at 09:10

## 2023-10-04 RX ADMIN — DIPHENHYDRAMINE HYDROCHLORIDE 50 MG: 50 INJECTION INTRAMUSCULAR; INTRAVENOUS at 08:10

## 2023-10-04 RX ADMIN — FAMOTIDINE 20 MG: 10 INJECTION, SOLUTION INTRAVENOUS at 08:10

## 2023-10-04 NOTE — TELEPHONE ENCOUNTER
Pt agreeable to trying olmesartan 5mg.   Let me know if she is Ok to stop K+ at this time, and any labs / sooner appts.    Thanks!

## 2023-10-05 RX ORDER — OLMESARTAN MEDOXOMIL 5 MG/1
5 TABLET ORAL DAILY
Qty: 90 TABLET | Refills: 3 | Status: SHIPPED | OUTPATIENT
Start: 2023-10-05 | End: 2024-10-04

## 2023-10-05 NOTE — TELEPHONE ENCOUNTER
Can you see how her BPs have been running b/c BP at clinic was high but she says at home is good. If >140 up top, would recommend nurse's visit. Can stop potassium. Repeat BMP and microalbumin in 2 weeks to make sure potassium is ok.

## 2023-10-07 ENCOUNTER — PATIENT MESSAGE (OUTPATIENT)
Dept: PRIMARY CARE CLINIC | Facility: CLINIC | Age: 63
End: 2023-10-07
Payer: COMMERCIAL

## 2023-10-11 VITALS — SYSTOLIC BLOOD PRESSURE: 136 MMHG | DIASTOLIC BLOOD PRESSURE: 81 MMHG

## 2023-10-18 ENCOUNTER — PATIENT MESSAGE (OUTPATIENT)
Dept: PRIMARY CARE CLINIC | Facility: CLINIC | Age: 63
End: 2023-10-18
Payer: COMMERCIAL

## 2023-10-20 ENCOUNTER — PATIENT MESSAGE (OUTPATIENT)
Dept: RHEUMATOLOGY | Facility: CLINIC | Age: 63
End: 2023-10-20
Payer: COMMERCIAL

## 2023-10-20 DIAGNOSIS — M33.13 DERMATOMYOSITIS: ICD-10-CM

## 2023-10-20 RX ORDER — TOFACITINIB 5 MG/1
TABLET, FILM COATED ORAL
Qty: 60 TABLET | Refills: 0 | Status: SHIPPED | OUTPATIENT
Start: 2023-10-20 | End: 2023-11-15

## 2023-10-26 ENCOUNTER — PATIENT MESSAGE (OUTPATIENT)
Dept: PRIMARY CARE CLINIC | Facility: CLINIC | Age: 63
End: 2023-10-26
Payer: COMMERCIAL

## 2023-10-31 ENCOUNTER — LAB VISIT (OUTPATIENT)
Dept: LAB | Facility: HOSPITAL | Age: 63
End: 2023-10-31
Attending: INTERNAL MEDICINE
Payer: COMMERCIAL

## 2023-10-31 ENCOUNTER — OFFICE VISIT (OUTPATIENT)
Dept: RHEUMATOLOGY | Facility: CLINIC | Age: 63
End: 2023-10-31
Payer: COMMERCIAL

## 2023-10-31 VITALS
SYSTOLIC BLOOD PRESSURE: 152 MMHG | HEART RATE: 102 BPM | DIASTOLIC BLOOD PRESSURE: 85 MMHG | HEIGHT: 65 IN | WEIGHT: 180.75 LBS | BODY MASS INDEX: 30.12 KG/M2

## 2023-10-31 DIAGNOSIS — E11.29 MICROALBUMINURIA DUE TO TYPE 2 DIABETES MELLITUS: ICD-10-CM

## 2023-10-31 DIAGNOSIS — R80.9 MICROALBUMINURIA DUE TO TYPE 2 DIABETES MELLITUS: ICD-10-CM

## 2023-10-31 DIAGNOSIS — M33.13 DERMATOMYOSITIS: Primary | ICD-10-CM

## 2023-10-31 LAB
ALBUMIN/CREAT UR: NORMAL UG/MG (ref 0–30)
CREAT UR-MCNC: 18 MG/DL (ref 15–325)
MICROALBUMIN UR DL<=1MG/L-MCNC: <5 UG/ML

## 2023-10-31 PROCEDURE — 99214 PR OFFICE/OUTPT VISIT, EST, LEVL IV, 30-39 MIN: ICD-10-PCS | Mod: S$GLB,,, | Performed by: INTERNAL MEDICINE

## 2023-10-31 PROCEDURE — 3079F DIAST BP 80-89 MM HG: CPT | Mod: CPTII,S$GLB,, | Performed by: INTERNAL MEDICINE

## 2023-10-31 PROCEDURE — 1159F MED LIST DOCD IN RCRD: CPT | Mod: CPTII,S$GLB,, | Performed by: INTERNAL MEDICINE

## 2023-10-31 PROCEDURE — 99999 PR PBB SHADOW E&M-EST. PATIENT-LVL III: CPT | Mod: PBBFAC,,, | Performed by: INTERNAL MEDICINE

## 2023-10-31 PROCEDURE — 3061F NEG MICROALBUMINURIA REV: CPT | Mod: CPTII,S$GLB,, | Performed by: INTERNAL MEDICINE

## 2023-10-31 PROCEDURE — 4010F PR ACE/ARB THEARPY RXD/TAKEN: ICD-10-PCS | Mod: CPTII,S$GLB,, | Performed by: INTERNAL MEDICINE

## 2023-10-31 PROCEDURE — 3066F PR DOCUMENTATION OF TREATMENT FOR NEPHROPATHY: ICD-10-PCS | Mod: CPTII,S$GLB,, | Performed by: INTERNAL MEDICINE

## 2023-10-31 PROCEDURE — 4010F ACE/ARB THERAPY RXD/TAKEN: CPT | Mod: CPTII,S$GLB,, | Performed by: INTERNAL MEDICINE

## 2023-10-31 PROCEDURE — 1159F PR MEDICATION LIST DOCUMENTED IN MEDICAL RECORD: ICD-10-PCS | Mod: CPTII,S$GLB,, | Performed by: INTERNAL MEDICINE

## 2023-10-31 PROCEDURE — 3077F PR MOST RECENT SYSTOLIC BLOOD PRESSURE >= 140 MM HG: ICD-10-PCS | Mod: CPTII,S$GLB,, | Performed by: INTERNAL MEDICINE

## 2023-10-31 PROCEDURE — 99214 OFFICE O/P EST MOD 30 MIN: CPT | Mod: S$GLB,,, | Performed by: INTERNAL MEDICINE

## 2023-10-31 PROCEDURE — 3077F SYST BP >= 140 MM HG: CPT | Mod: CPTII,S$GLB,, | Performed by: INTERNAL MEDICINE

## 2023-10-31 PROCEDURE — 3008F PR BODY MASS INDEX (BMI) DOCUMENTED: ICD-10-PCS | Mod: CPTII,S$GLB,, | Performed by: INTERNAL MEDICINE

## 2023-10-31 PROCEDURE — 3066F NEPHROPATHY DOC TX: CPT | Mod: CPTII,S$GLB,, | Performed by: INTERNAL MEDICINE

## 2023-10-31 PROCEDURE — 3044F HG A1C LEVEL LT 7.0%: CPT | Mod: CPTII,S$GLB,, | Performed by: INTERNAL MEDICINE

## 2023-10-31 PROCEDURE — 3044F PR MOST RECENT HEMOGLOBIN A1C LEVEL <7.0%: ICD-10-PCS | Mod: CPTII,S$GLB,, | Performed by: INTERNAL MEDICINE

## 2023-10-31 PROCEDURE — 3079F PR MOST RECENT DIASTOLIC BLOOD PRESSURE 80-89 MM HG: ICD-10-PCS | Mod: CPTII,S$GLB,, | Performed by: INTERNAL MEDICINE

## 2023-10-31 PROCEDURE — 3061F PR NEG MICROALBUMINURIA RESULT DOCUMENTED/REVIEW: ICD-10-PCS | Mod: CPTII,S$GLB,, | Performed by: INTERNAL MEDICINE

## 2023-10-31 PROCEDURE — 3008F BODY MASS INDEX DOCD: CPT | Mod: CPTII,S$GLB,, | Performed by: INTERNAL MEDICINE

## 2023-10-31 PROCEDURE — 82043 UR ALBUMIN QUANTITATIVE: CPT | Performed by: INTERNAL MEDICINE

## 2023-10-31 PROCEDURE — 99999 PR PBB SHADOW E&M-EST. PATIENT-LVL III: ICD-10-PCS | Mod: PBBFAC,,, | Performed by: INTERNAL MEDICINE

## 2023-10-31 NOTE — PROGRESS NOTES
RHEUMATOLOGY CLINIC FOLLOW UP VISIT  Chief complaints:-  To follow up for Myositis follow up     HPI:-  Ermelinda Javed a 62 y.o. pleasant female  with history of L sided infiltrating ductal carcinoma of the L breast (dx on Jan 2017), HTN, depression, gastritis, and recently diagnosed myositis (uncertain if it's autoimmune vs medication induced), present today with concern about myositis flare     Patient was initially send from hem/onc clinic for evaluation of possible inflammatory myositis.  Patient was hospitalized from 1/22/19 till 2/13/19 for myositis.      L breast cancer s/p completion of 4 cycles of demi-adjuvant taxotere and cytoxan (completed on 5/9/17) and mastectomy (6/26/17) and then 4 cycles of adjuvant Adriamycin (completed 9/12/17). In 10/2017 - patient developed L supraclavicular lymphadenopathy which FNA confirmed as reoccurrence of previously treated breast cancer.      Patient started on Atelizumab/Abraxane on 11/7/18. Per chart review, patient noticed rashes on the dorsum of her hands on 11/11/18. Seen in urgent care and given topical steroid cream. Then received two more infusions on Atelizumab/Abraxane on 11/21/18 and 12/5/18. Started to notice puffiness around the eyes on 12/11/18. Received another Abraxane infusion on 12/12/18 but this time with hydrocortisone 50 mg which helped reduce the swelling around the eyes. Developed swelling of the face again on 12/15/18. Next infusion of Abraxane done on 12/19/18 with Solucortef and Atelizumab held. Abraxane given again on 1/3/19 with no IV steroid. Patient developed swelling of the face on 1/4/19 and went to urgent care and given short course of prednisone 20 mg BID. On 1/15/19, patient seen in hem/onc clinic with c/o of pressure and tightness around neck. CT scan of chest and neck did not reveal any vascular compression. Started on prednisone 60 mg with taper. On 1/22/18 patient with c/o  proximal muscle weakness. CPK found to be elevated around 4k. Patient admitted and given solumedrol 80 mg IV on 1/22/18. Last infusion of Atelizumab on 12/19/18. Last infusion of Abraxane on 1/3/19. Given Solumedrol 1g x 1 on 1/23/18. Seen by Dermatology on 1/24/18 and biopsy done of skin rash. Given distribution of rashes, there was concern for dermatomyositis. Patient started on Solumedrol 125 mg IV BID at this time.      During her hospitalization patient was started on high dose steroid Solumedrol 80mg IV x 1, 1g x 1, and then 125mg BID until tapered down to medrol 48mg.  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  Patient was started on IVIG (400mg/kg/daily x 5 day) 1/29/19-2/2.   Patient started on MTX 20mg SQ (Feb 5 2019) with folic acid. MTX SQ was transitioned to PO because concern about rehab administration.  Patient failed swallow eval and PEG tube was placed.        Denies any family history of autoimmune diseases.     No smoking, EtOH, recreational drug usage.     Denies any photosensitivity, joint swelling, unintentional weigh loss, abdominal pain, night sweats, CP, SOB.  +oral thrush.      Completed rehab at [facility].  Completed IVIG (2/28 and 3/1).  Had mild HA after infusion.  Doing well.  A lot stronger - able to do household chores since discharge.  Not back at baseline yet ~80%.  Mild weakness with increased activities.  Able to ambulate without assistance (but is still a fall precaution).  Tolerating diet via PEG tube.  Completed rehab.      MTX discontinued 2/18 with concern of toxicity (decrease blood counts and transaminatis).  Folic acid increased to 4mg daily.  Still on medrol 32mg daily with atorvaquone for PCP prophylaxis.  Bactrim d/c because of interaction with leucovorin.  Leucovorin 5mg daily started - Leucovorin discontinued.  Continues to be on folic acid 4mg daily     Radiation completed (28 days) completed May 8.       EGD/colonoscopy done on July 29 - normal.  PEG  tube removed     Last PET scan (June 20) showed negative for metastasis.  At this time, will monitor per oncology and repeat PET scan in Sept.  No treatment needed at this time.      Rash was biopsied and evaluated by dermatology.   Biopsy report conclusive of dermatomyositis.  Was given topical steroid which provided minimal alleviation of symptoms.  +paco      6/2020    Completed Rituxan on 4/27 (1g x 2).  Had a flare after infusion requiring increase of steroid.  Since then patient had been doing well - improving of myalgia and rash.  Still having mild edema and generalized weakness (especially in the afternoon/evening).       Patient was started on HCQ - compliant on all home medications.  Continues to work out daily.  Finds most difficulty with getting in and out of the car.   Denies any dysphagia, alopecia, arthralgia, abdominal pain, CP, SOB, N/V, chills, or urinary symptoms.      7/2020    Rash all over her body  Red rash on the left leg  Face once again has erythema and heliotrope rash  Prednisone down to 10 mg and looks like she has a flare again  On plaquenil 200 mg bid  Wears sunscreen  Avoids the direct sun    Most recent labs     Ck nml  Aldolase nml  ESR 53  CRP nml  Mild anemia  AST 67      9/2020    She started xeljanz 5 mg bid mid august  She feels well  Swelling is better  Her weakness and fatigue is all gone  Face still looks red   Chest is all better and upper arm is great  Itch and rash seems better   Rash on the legs is gone     CK,aldolase nml  CRP nml   now   AST 61  GFR nml  ALT nml    plts 145 but stable  H/h 11.6/35.3    But the white count has dropped to 2.70    It has been low on and off since 2019 so this is not new     She has a sinus issue going on   She has greenish d/c  She has a tinge of blood       10/12/2020  We cut the xeljanz dose to 5 mg daily  No more swelling  Face looks clear,not much redness  The eyelid is not puffy and not intensely erythematous   Along the  nasal folds there is some redness but again not intense  On the MCPs and Dorsal hands she has some erythema which is more pinkish  Not itchy  Sometimes these pink areas can turn pigmented  Now complaint to sunscreen      White count has improved from 2.72 to 2.80  H/h 11.3/34.7  plts nml  ANC has improved from 0.6 to 1.4  Lymphocyte count 0.9    2/2021    She has fatigue if she has to do a lot of things  Facial rash is stable  On the hands : redness is stable  Sometimes left ankle and left knee is swollen,painful/tight   Not muscular weakness but she feels skinny  BMI is 29.39 though    White count 4.30  H/H 11.6/35.4  plts 223  Lymphocytes nml    AST went upto 52  Rest of LFTs ok  GFR nml  ESR > 120  CRP nml  CK,aldolase nml    2/2021 we made her xeljanz 7.5 mg daily  Gets IVIG     4/2021  Dermatology said    Patient appears to have 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 No need for addition of systemic steroids with controlled myopathy and otherwise absence of systemic disease due to lack of efficacy in controlling cutaneous disease, side effect profile.   Currently with evidence of both nonvasculopathic cutaneous disease (heliotrophe, facial rash, Gottron's papules) as well as a degree vasculopathic disease (nailfold vascular changes, evidence digital pulp ulcers/lesions, associated pain at these locations). Overall patient doing well, states QOL not greatly affected by residual rashes.      Considerations for comorbid rosacea/seb derm at face (micropustules on exam today; some scaling at brows, nasal creases noted).      Recommendations:  - Strict sun protection measures always.   - Optimization of topicals as above: Elidel mixed 50/50 with ketoconazole cream BID PRN rash at face; Plexion wash daily at face; transition to Diprolene cream for rash at hands.   - Offered ILK to rash at hands, patient again declined.   - Consideration for  addition of vasodilatory agent (e.g. nifedipine) for relief in associated pain at fingertip lesions.   - Otherwise c/w rheumatologic management with IVIG, tofacitinib.      Labs 4/2021    H/h 11.3/36.2  nml white count 5.27  plts nml    5/2021    Patient would like to consider steroid/intralesional kenalog injections  She had stopped xeljanz on 4/28 for the covid vaccine   White count was done 2 days after 4/28 which is on 4/30 and the count was 5.27  She got covid vaccine on 3/29   Stopped xeljanz till 4/6  Did the blood work on 4/8 and was on xeljanz at that time  So her white count was 3.80  Prior to that she was 4.30 and 5.19    CMP nml    10/2021    She is doing really well  She has not needed steroid creams or injections  Skin looks really good  She is not puffy,she is not weak    Left foot has a stabbing pain after she walks around for some time  Some aches and pains    8/2021    CRP 15.8  ESR > 130  She had sinus issues at this time  Ck,aldolase nml  AST still 51  ALT nml  GFR nml  H/h 11.6/34.7  nml white count,4.90  nml plts  GGT nml  Vit d nml    1/2022    She is doing really really well  No more red and pink rashes  No more edema  On xeljanz 5 mg daily/was on 7.5 mg daily   Insurance is denying-so appeal     Labs     Ck,aldolase nml  CRP nml  ESR 67  CMP nml  CBC nml      5/2022    She took xeljanz 5 mg daily for 2 to 3 months and then didn't have medication for a month- thankfully has not flared in the skin and muscles  Today's labs are pending   IVIG - is still monthly     9/2022      On xeljanz 5 mg daily  She feels great  IVIG is monthly    Right hand -looks a little flared - this is her driving hand     Aldolase nml  -Stable  CRP nml  ESR Down to 67,it was > 120  CMP- AST upto 50  ALT nml  CBC - H/H 11.5/34.1,Nml plts,white count       12/2022    H/h 11.5/35  Nml white and plt count  Ck 238  Aldolase nml      3/2023    Calcium 11.1  She was taking 2 calciums a day  Protein is also high  11  Creat nml  AST down to 28  ALT nml    3/2023    IVIG is 1 g/kg monthly since may 2022  Xeljanz 5 mg daily - since sep 2022    Skin-doing great    For 6 months she has done half the dose and she has still done well    6/2023  She has muscle weakness with activity   This started after we dropped the dose of IVIG to every 8 weeks  Sciatica left side  Exam 5/5 strength  Bending over-some pain  Lifting baby- she has pain     Rash resolved     On xeljanz 5 mg daily        She has been taking crestor for a long time  So this might be a flare due to tapering off IVIG and not the statins    10/2023    Taking xeljanz - 5 mg daily  IVIG 2 G/KG BW EVERY 4 WEEKS  She wants to do every 6 weeks    200 mg dietary calcium and Vitamin D3 1000 mg daily    Been feeling good overall. Muscle weakness is not getting worse and has been feeling better since doing IVIG monthly  No rashes recently   -Good strength 5/5    Joints are achy- worse with the cold weather- knees and ankles  -Good ROM and no TTP  -Tylenol helps with pain    No longer having sciatica    Muscle strengthening exercises at home- been doing them. Feels like it helps her strength     Had flu vaccination done    Labs 9/2023  ESR- >130  CRP- normal  CK- 239  Aldolase- normal  CBC- low H/H- 11.7/36.3  CMP- Potassium 3.3, calcium 10.4, total protein 11.3,   AST-48  ALT- normal  GFR- >60      Review of Systems   Constitutional:  Negative for chills, diaphoresis, fever, malaise/fatigue and weight loss.   HENT:  Negative for congestion, ear discharge, ear pain, hearing loss, nosebleeds, sinus pain and tinnitus.    Eyes:  Positive for discharge. Negative for photophobia, pain and redness.   Respiratory:  Negative for cough, hemoptysis, sputum production, shortness of breath, wheezing and stridor.    Cardiovascular:  Negative for chest pain, palpitations, orthopnea, claudication, leg swelling and PND.   Gastrointestinal:  Positive for constipation. Negative for abdominal pain,  diarrhea, heartburn, nausea and vomiting.   Genitourinary:  Negative for dysuria, frequency, hematuria and urgency.   Musculoskeletal:  Positive for myalgias. Negative for back pain, joint pain and neck pain.   Skin:  Positive for rash.   Neurological:  Negative for dizziness, tingling, tremors, weakness and headaches.   Endo/Heme/Allergies:  Does not bruise/bleed easily.   Psychiatric/Behavioral:  Negative for depression, hallucinations and suicidal ideas. The patient is not nervous/anxious and does not have insomnia.        Past Medical History:   Diagnosis Date    Anemia 2019    Anxiety 2018    Breast cancer 2017    left    Controlled type 2 diabetes mellitus without complication, without long-term current use of insulin 2023    Depression     Dermatomyositis     Diverticulosis     Erosive esophagitis     Gastritis     Hepatic cyst     Hypertension     Immune disorder 2019    Joint pain 2019    Keloid cicatrix 2005    Thyroiditis     Vitamin B12 deficiency 3/8/2018       Past Surgical History:   Procedure Laterality Date    BREAST BIOPSY Left     BREAST RECONSTRUCTION Left 2017     SECTION      COLONOSCOPY  2011    repeat in 10 yrs.    COLONOSCOPY N/A 2019    Procedure: COLONOSCOPY;  Surgeon: Pernell Rosas MD;  Location: [facility] (4TH FLR);  Service: Endoscopy;  Laterality: N/A;  Patient would like to use her PEG tube for bowel prep and then have her PEG tube removed after EGD and colonoscopy procedures while she is still sedated.    D&C      ESOPHAGOGASTRODUODENOSCOPY N/A 2019    Procedure: EGD (ESOPHAGOGASTRODUODENOSCOPY);  Surgeon: Pernell Rosas MD;  Location: [facility] ([facility] FLR);  Service: Endoscopy;  Laterality: N/A;    ESOPHAGOGASTRODUODENOSCOPY N/A 2019    Procedure: EGD (ESOPHAGOGASTRODUODENOSCOPY);  Surgeon: Pernell Rosas MD;  Location: [facility] (4TH FLR);  Service: Endoscopy;  Laterality: N/A;  EGD for follow-up of erosive esophagitis per Dr. Rosas    Patient would like to  "use her PEG tube for bowel prep and then have her PEG tube removed after EGD and colonoscopy procedures while she is still sedated.    INSERTION OF TUNNELED CENTRAL VENOUS CATHETER (CVC) WITH SUBCUTANEOUS PORT Right 10/31/2018    Procedure: [id number];  Surgeon: Deo Palencia MD;  Location: [facility] OR [address] [place] [address];  Service: General;  Laterality: Right;    MASTECTOMY Left 06/26/2017    MYOMECTOMY      PORTACATH PLACEMENT      SENTINEL LYMPH NODE BIOPSY  02/2017    left    SKIN BIOPSY  2019    TOTAL REDUCTION MAMMOPLASTY Right 2017    UPPER GASTROINTESTINAL ENDOSCOPY          Social History     Tobacco Use    Smoking status: Never    Smokeless tobacco: Never   Substance Use Topics    Alcohol use: Not Currently    Drug use: No       Family History   Problem Relation Age of Onset    Hypertension Mother     Heart disease Father     Hypertension Father     Breast cancer Sister 52    No Known Problems Sister     No Known Problems Brother     No Known Problems Brother     No Known Problems Brother     Thyroid disease Daughter         hyperthyroidism s/p thyroidectomy    No Known Problems Daughter     No Known Problems Son     Colon cancer Neg Hx     Ovarian cancer Neg Hx     Celiac disease Neg Hx     Cirrhosis Neg Hx     Colon polyps Neg Hx     Esophageal cancer Neg Hx     Inflammatory bowel disease Neg Hx     Liver cancer Neg Hx     Liver disease Neg Hx     Rectal cancer Neg Hx     Stomach cancer Neg Hx     Ulcerative colitis Neg Hx     Melanoma Neg Hx        Review of patient's allergies indicates:  No Known Allergies    Vitals:    10/31/23 1452   BP: (!) 152/85   Pulse: 102   Weight: 82 kg (180 lb 12.4 oz)   Height: 5' 5" (1.651 m)       Physical Exam  HENT:      Head: Normocephalic and atraumatic.   Eyes:      Pupils: Pupils are equal, round, and reactive to light.   Cardiovascular:      Rate and Rhythm: Normal rate and regular rhythm.      Heart sounds: Normal heart sounds.   Pulmonary:      Effort: Pulmonary " effort is normal.      Breath sounds: Normal breath sounds.   Abdominal:      General: Bowel sounds are normal.      Palpations: Abdomen is soft.   Musculoskeletal:         General: Normal range of motion.      Cervical back: Normal range of motion and neck supple.   Skin:     General: Skin is warm and dry.      Findings: No erythema or rash.      Comments: Right hand - rash is back again,left hand not so prominent     Neurological:      Mental Status: She is alert and oriented to person, place, and time.      Gait: Gait is intact.   Psychiatric:         Mood and Affect: Mood and affect normal.         Cognition and Memory: Memory normal.         Judgment: Judgment normal.       Digital pitting +    Labs:  Results for ROMEO PEREZ (MRN [id number]) as of 6/17/2020 10:12   Ref. Range 4/22/2020 11:51 4/24/2020 11:43 5/25/2020 09:34 6/10/2020 11:25 6/10/2020 11:26   WBC Latest Ref Range: 3.90 - 12.70 K/uL 4.30  3.60 (L) 4.60    RBC Latest Ref Range: 4.00 - 5.40 M/uL 4.49  4.52 4.47    Hemoglobin Latest Ref Range: 12.0 - 16.0 g/dL 12.1  12.5 12.4    Hematocrit Latest Ref Range: 37.0 - 48.5 % 38.2  38.7 38.4    MCV Latest Ref Range: 82 - 98 fL 85  86 86    MCH Latest Ref Range: 27.0 - 31.0 pg 27.0  27.7 27.8    MCHC Latest Ref Range: 32.0 - 36.0 g/dL 31.8 (L)  32.4 32.4    RDW Latest Ref Range: 11.5 - 14.5 % 16.0 (H)  17.2 (H) 17.3 (H)    Platelets Latest Ref Range: 150 - 350 K/uL 157  206 164    MPV Latest Ref Range: 7.4 - 10.4 fL 7.3 (L)  7.0 (L) 7.2 (L)    Gran% Latest Ref Range: 38.0 - 73.0 % 66.0  62.8 66.2    Gran # (ANC) Latest Ref Range: 1.8 - 7.7 K/uL 2.8  2.2 3.0    Lymph% Latest Ref Range: 18.0 - 48.0 % 15.3 (L)  15.5 (L) 14.6 (L)    Lymph # Latest Ref Range: 1.0 - 4.8 K/uL 0.7 (L)  0.6 (L) 0.7 (L)    Mono% Latest Ref Range: 4.0 - 15.0 % 11.2  17.7 (H) 14.2    Mono # Latest Ref Range: 0.3 - 1.0 K/uL 0.5  0.6 0.6    Eosinophil% Latest Ref Range: 0.0 - 8.0 % 6.5  3.1 3.9    Eos # Latest Ref Range: 0.0 - 0.5  K/uL 0.3  0.1 0.2    Basophil% Latest Ref Range: 0.0 - 1.9 % 1.0  0.9 1.1    Baso # Latest Ref Range: 0.00 - 0.20 K/uL 0.00  0.00 0.00    nRBC Latest Ref Range: 0 /100 WBC 0  0 0    Differential Method Unknown Automated  Automated Automated    Sed Rate Latest Ref Range: 0 - 30 mm/Hr 56 (H)  91 (H)  73 (H)   Sodium Latest Ref Range: 136 - 145 mmol/L 135 (L)  139 137    Potassium Latest Ref Range: 3.5 - 5.1 mmol/L 3.8  3.8 3.8    Chloride Latest Ref Range: 95 - 110 mmol/L 98  102 102    CO2 Latest Ref Range: 23 - 29 mmol/L 31 (H)  29 29    BUN, Bld Latest Ref Range: 7 - 17 mg/dL 17  12 13    Creatinine Latest Ref Range: 0.70 - 1.20 mg/dL 0.70  0.60 (L) 0.70    eGFR if non African American Latest Ref Range: >60 mL/min/1.73 m^2 >60  >60 >60    eGFR if  Latest Ref Range: >60 mL/min/1.73 m^2 >60  >60 >60    Glucose Latest Ref Range: 74 - 106 mg/dL 114 (H)  89 93    Calcium Latest Ref Range: 8.4 - 10.2 mg/dL 9.8  9.8 9.7    Alkaline Phosphatase Latest Ref Range: 38 - 145 U/L 65  63 58    PROTEIN TOTAL Latest Ref Range: 6.3 - 8.2 g/dL 9.3 (H)  8.9 (H) 8.4 (H)    Albumin Latest Ref Range: 3.5 - 5.2 g/dL 4.1  4.2 4.1    BILIRUBIN TOTAL Latest Ref Range: 0.2 - 1.3 mg/dL 0.4  0.5 0.4    AST Latest Ref Range: 14 - 36 U/L 85 (H)  64 (H) 62 (H)    ALT Latest Ref Range: 10 - 44 U/L 28  26 22    CRP Latest Ref Range: 0.0 - 10.0 mg/L <5.0  <5.0 <5.0    CPK Latest Ref Range: 30 - 135 U/L 115  79 101    Hemoglobin A1C External Latest Ref Range: 4.0 - 6.0 %     6.0   SARS-CoV2 (COVID-19) Qualitative PCR Latest Ref Range: Not Detected   Not Detected      Aldolase Latest Ref Range: < OR = 8.1 U/L 5.9  5.5  4.7     Medication List with Changes/Refills   Current Medications    CALCIUM CARBONATE (OS-ESTELA) 600 MG CALCIUM (1,500 MG) TAB    Take 600 mg by mouth once daily.    IPRATROPIUM (ATROVENT) 21 MCG (0.03 %) NASAL SPRAY    2 sprays by Each Nostril route 2 (two) times daily.    LEVOCETIRIZINE (XYZAL) 5 MG TABLET    Take 1  tablet (5 mg total) by mouth every evening.    MAGNESIUM 30 MG TAB    Take 1 tablet by mouth Daily.    MULTIVITAMIN CAPSULE    Take 1 capsule by mouth once daily.    NIFEDIPINE (PROCARDIA-XL) 30 MG (OSM) 24 HR TABLET    Take 1 tablet (30 mg total) by mouth once daily.    OLMESARTAN (BENICAR) 5 MG TAB    Take 1 tablet (5 mg total) by mouth once daily.    ROSUVASTATIN (CRESTOR) 10 MG TABLET    Take 1 tablet (10 mg total) by mouth once daily.    VITAMIN D (VITAMIN D3) 1000 UNITS TAB    Take 1,000 Units by mouth once daily.    XELJANZ 5 MG TAB    Take 1 tablet by mouth twice a day as directed by physician.       Assessment/Plans:-  59 y.o.F with history of L sided infiltrating ductal carcinoma of the L breast (dx on Jan 2017), HTN, depression, gastritis, and recently diagnosed myositis (uncertain if it's autoimmune vs medication induced), present today for follow up because of concern about myositis flare.     Patient started on Atelizumab/Abraxane on 11/7/18.  Patient developed swelling of the face on 1/4/19 and went to urgent care and given short course of prednisone 20 mg BID. On 1/15/19, patient seen in hem/onc clinic with c/o of pressure and tightness around neck. CT scan of chest and neck did not reveal any vascular compression. Started on prednisone 60 mg with taper. On 1/22/18 patient with c/o proximal muscle weakness. CPK found to be elevated around 4k. Patient admitted and given solumedrol 80 mg IV on 1/22/18. Last infusion of Atelizumab on 12/19/18. Last infusion of Abraxane on 1/3/19. Given Solumedrol 1g x 1 on 1/23/18. Seen by Dermatology on 1/24/18 and biopsy done of skin rash. Given distribution of rashes, there was concern for dermatomyositis. Patient started on Solumedrol 125 mg IV BID at that time.  Skin biopsy resulted consistent with dermatomyositis.     Labs: CPK and Aldolase normalized. +DARCY 1:640 speckled with negative profile.  Myomarker +TIF1 gamma - consistent of CAM (cancer associated  myositis)     Ferritin elevated at 641, iron on low side at 37, tibc low at 247, transferrin low 167, haptoglobin 153, retic slightly increased at 2.6%, ldh increased at 325. quantTB: indeterminate, T-spot: negative     Spirometry: normal, normal diffusion capacity. FVC: 81%, DLCO: 114%     Patient exhibits signs of dermatomyositis (heliotropic rash and gottron's papules).   Dermatomyositis can be associated with malignancy.  MRI of LUE showed edema of the deltoid and biceps.  Exam with proximal muscle weakness: b/l deltoids 4/5, b/l iliopsoas 4.4/5. 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   Given +TIF1 gamma positivity, most likely dermatomyositis is from underlying malignancy.      Failed 2nd swallow eval on 2/6/19. PEG placed 2/7/2019.     Current medications:  - prednisone 20mg   - IVIG Started Jan 2019 - last dose April 7  - Rituxan 1000mg x 2 (last dose April 27)  - HCQ 200mg BID      Esophageal biopsy - negative for viral infection.  Nonspecific chronic inflammation.     EGD/Colonoscopy (July 2019) - normal. PEG tube removed      Fiboscan done Aug 2019 - showed fatty liver with no scarring/damage.,      Failed Cellcept - worsening leukopenia, elevated LFTs, and other side effects without improvement of symptoms      Recent studies demonstrated increased efficacy in IVIG when spaced out (Q2w instead of Q4w)     Patient is an intermediate metabolizer based on TPMT.  Cannot start imuran.  Labs still neutropenic and thrombocytopenic at this time - uncertain of the cause (radiation induce BM fibrosis vs medication induce?)      Vaccination completed - Prevnar and Shingles (completed Aug 2019) and flu vaccination for this season (Aug 2019)      Dermatomyositis - currently on Rituxan  and Prednisone 20mg daily.  Tolerating well and improvement of symptoms.  Plan is taper steroid and continue Rituxan 1000mg x 2 every 6 months.     It appears that patient continued to fail steroid tapering on multiple occasion.  Patient is uptodate on all CA screening - next PET scan is July 2020.  Other consideration for continued myalgia includes neurological condition such as MG.  Will other MG panel and referral to neurology.     If patient continues to failed steroid tapering - consideration for tacrolimus + IVIG, Xeljanz, plasmapharesis/plasma exchange.       My addendum last time    60 year old female with dermatomyositis s/p PD1 inhibitor used for breast cancer  TIF1 gamma +    Low dose steroids didn't help    Rash+ muscle weakness+dysphagia     IVIG and steroids initial treatment    mtx caused LFT derangement,anemia and leukopenia   She didn't respond to IVIG, initially we gave IVIG 2 g/kg every 4 weeks and then 1 g/kg bw ever 2 weeks : poor response   She was on cellcept 500 mg daily and she had cytopenias   Imuran not an option since she is intermediate metaboliser     She had significant periorbital edema,rash on the neck and thighs    We didn't think she was responding to the treatment     So we gave her rituximab 1000 mg x 2    She did well and then she flared again may 2020    We had to go up on the dose to 30 mg and then tapered it to 20 mg  We started plaquenil    CT chest abdomen and pelvis nml  Colonoscopy nml  PET lymphadenopathy  Echo nml    Myasthenia gravis panel negative     Last time she needed prednisone 10 mg     She was weak and her skin had flared    Refractory dermatomyositis    We resumed IVIG and xeljanz 5 mg bid     She had done really well        9/2020    She started xeljanz 5 mg bid mid august  She feels well  Swelling is better  Her weakness and fatigue is all gone  Face still looks red   Chest is all better and upper arm is great  Itch and rash seems better   Rash on the legs  is gone     CK,aldolase nml  CRP nml   now   AST 61  GFR nml  ALT nml    plts 145 but stable  H/h 11.6/35.3    But the white count has dropped to 2.70    It has been low on and off since 2019 so this is not new     Her lymphocyte count is 900 cells/mm3    Lymphopenia    In the controlled clinical trials, confirmed decreases in absolute lymphocyte counts below 500 cells/mm3 occurred in 0.04% of patients for the 5 mg twice daily and 10 mg twice daily XELJANZ groups combined during the first 3 months of exposure.    Confirmed lymphocyte counts less than 500 cells/mm3 were associated with an increased incidence of treated and serious infections      Her neutrophil count is 1300 cells/mm3    Neutropenia    In the controlled clinical trials, confirmed decreases in ANC below 1000 cells/mm3 occurred in 0.07% of patients for the 5 mg twice daily and 10 mg twice daily XELJANZ groups combined during the first 3 months of exposure.    There were no confirmed decreases in ANC below 500 cells/mm3 observed in any treatment group.    There was no clear relationship between neutropenia and the occurrence of serious infections.    In the long-term safety population, the pattern and incidence of confirmed decreases in ANC remained consistent with what was seen in the controlled clinical trials        Lymphocyte Abnormalities    Treatment with XELJANZ was associated with initial lymphocytosis at one month of exposure followed by a gradual decrease in mean absolute lymphocyte counts below the baseline of approximately 10% during 12 months of therapy. Lymphocyte counts less than 500 cells/mm3 were associated with an increased incidence of treated and serious infections.    Avoid initiation of XELJANZ/XELJANZ XR treatment in patients with a low lymphocyte count (i.e., less than 500 cells/mm3). In patients who develop a confirmed absolute lymphocyte count less than 500 cells/mm3, treatment with XELJANZ/XELJANZ XR is not  recommended.    Monitor lymphocyte counts at baseline and every 3 months thereafter.       Neutropenia    Treatment with XELJANZ was associated with an increased incidence of neutropenia (less than 2000 cells/mm3) compared to placebo.    Avoid initiation of XELJANZ/XELJANZ XR treatment in patients with a low neutrophil count (i.e., ANC less than 1000 cells/mm3). For patients who develop a persistent ANC of 500 to 1000 cells/mm3, interrupt XELJANZ/XELJANZ XR dosing until ANC is greater than or equal to 1000 cells/mm3. In patients who develop an ANC less than 500 cells/mm3, treatment with XELJANZ/XELJANZ XR is not recommended.    Monitor neutrophil counts at baseline and after 4-8 weeks of treatment and every 3 months thereafter.     Anemia    Avoid initiation of XELJANZ/XELJANZ XR treatment in patients with a low hemoglobin level (i.e., less than 9 g/dL). Treatment with XELJANZ/XELJANZ XR should be interrupted in patients who develop hemoglobin levels less than 8 g/dL or whose hemoglobin level drops greater than 2 g/dL on treatment.    So at this point we decided to continue the xeljanz 5 mg bid and IVIG     But we sent her to hematology and the counts further dropped     10/12/2020  We cut the xeljanz dose to 5 mg daily  No more swelling  Face looks clear,not much redness  The eyelid is not puffy and not intensely erythematous   Along the nasal folds there is some redness but again not intense  On the MCPs and Dorsal hands she has some erythema which is more pinkish  Not itchy  Sometimes these pink areas can turn pigmented  Now complaint to sunscreen      White count has improved from 2.72 to 2.80  H/h 11.3/34.7  plts nml  ANC has improved from 0.6 to 1.4  Lymphocyte count 0.9    Ck,aldolase nml  CRP nml  ESR 65      2/2021  Now completely off prednisone    Some hot flashes at nights  Not documented temperatures/some night sweats  Recent cancer screening negative  Ct chest,abdomen and pelvis and chest    She has  fatigue if she has to do a lot of things  Facial rash is stable  On the hands : redness is stable  Sometimes left ankle and left knee is swollen,painful/tight   Not muscular weakness but she feels skinny  BMI is 29.39 though    White count 4.30  H/H 11.6/35.4  plts 223  Lymphocytes nml    AST went upto 52  Rest of LFTs ok  GFR nml  ESR > 120  CRP nml  CK,aldolase nml        2/2021 we made her xeljanz 7.5 mg daily  Gets IVIG     4/2021  Dermatology said    Patient appears to have 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 No need for addition of systemic steroids with controlled myopathy and otherwise absence of systemic disease due to lack of efficacy in controlling cutaneous disease, side effect profile.   Currently with evidence of both nonvasculopathic cutaneous disease (heliotrophe, facial rash, Gottron's papules) as well as a degree vasculopathic disease (nailfold vascular changes, evidence digital pulp ulcers/lesions, associated pain at these locations). Overall patient doing well, states QOL not greatly affected by residual rashes.      Considerations for comorbid rosacea/seb derm at face (micropustules on exam today; some scaling at brows, nasal creases noted).      Recommendations:  - Strict sun protection measures always.   - Optimization of topicals as above: Elidel mixed 50/50 with ketoconazole cream BID PRN rash at face; Plexion wash daily at face; transition to Diprolene cream for rash at hands.   - Offered ILK to rash at hands, patient again declined.   - Consideration for addition of vasodilatory agent (e.g. nifedipine) for relief in associated pain at fingertip lesions.   - Otherwise c/w rheumatologic management with IVIG, tofacitinib.      Labs 4/2021    H/h 11.3/36.2  nml white count 5.27  plts nml    5/2021    Patient would like to consider steroid/intralesional kenalog injections  She had stopped xeljanz on 4/28 for the  covid vaccine   White count was done 2 days after 4/28 which is on 4/30 and the count was 5.27  She got covid vaccine on 3/29   Stopped xeljanz till 4/6  Did the blood work on 4/8 and was on xeljanz at that time  So her white count was 3.80  Prior to that she was 4.30 and 5.19    CMP nml      5/2021  She has shown remarkable improvement   She used to be very erythematous on the face and the hands,but that has significantly resolved  She had rashes on the chest,upper back and legs and that has resolved  She had calcinosis and that has resolved  She has no muscle weakness  All she has is mild erythema on the forehead,nose,around the nasolabial fold,heliotrope rash on the eyelids,gottrons papules on the MCPs and PIPs   She has digital pitting with no active ulcers  She has nail fold capillary changes     10/2021    She is doing really well  She has not needed steroid creams or injections  Skin looks really good  She is not puffy,she is not weak    Left foot has a stabbing pain after she walks around for some time  Some aches and pains-exam nml  May be neuropathy    8/2021    CRP 15.8  ESR > 130  She had sinus issues at this time  Ck,aldolase nml  AST still 51  ALT nml  GFR nml  H/h 11.6/34.7  nml white count,4.90  nml plts  GGT nml  Vit d nml    1/2022    She is doing really really well  No more red and pink rashes  No more edema  On xeljanz 5 mg daily/was on 7.5 mg daily   Insurance is denying-so appeal   Patient really needs to continue the current regimen since she has done really well,she has tried and failed many other drugs and stopping xeljanz will only cause flare up of the disease    Labs     Ck,aldolase nml  CRP nml  ESR 67  CMP nml  CBC nml      5/2022    She took xeljanz 5 mg daily for 2 to 3 months and then didn't have medication for a month- thankfully has not flared in the skin and muscles  Today's labs are pending - protein high,LFTs nml,CK slowly trending up,CRP mild elevation  IVIG - is still monthly    Exam-no rash and muscle strength normal      9/2022      On xeljanz 5 mg daily  She feels great  IVIG is monthly-but we have cut the dose to 1 g/kg monthly not 2 g/kg monthly    Right hand -looks a little flared - this is her driving hand     Aldolase nml  -Ebgowp-psbdee trending up  CRP nml  ESR Down to 67,it was > 120  CMP- AST upto 50  ALT nml  CBC - H/H 11.5/34.1,Nml plts,white count       12/2022    H/h 11.5/35  Nml white and plt count  Ck 238  Aldolase nml      3/2023    Calcium 11.1  She was taking 2 calciums a day  Protein is also high 11  Creat nml  AST down to 28  ALT nml    3/2023    IVIG is 1 g/kg monthly since may 2022  Xeljanz 5 mg daily - since sep 2022  In September -we had decided to increase xeljanz to 7.5 mg but she didn't do it  She had a mild flare at that time    Skin-doing great    She didn't increase the dose but 5 mg is keeping her stable    For 6 months she has done half the dose and she has still done well    CRP 12  ESR 77  Aldolase 9.1  Ck 242  H/h 11.2/36.6    6/2023    She has muscle weakness with activity   This started after we dropped the dose of IVIG to every 8 weeks  Sciatica left side  Exam 5/5 strength  Bending over-some pain  Lifting baby- she has pain    Rash resolved    On xeljanz 5 mg daily      She has been taking crestor for a long time  So this might be a flare due to tapering off IVIG and not the statins        10/2023    Taking xeljanz - 5 mg daily  IVIG 2 G/KG BW EVERY 4 WEEKS  She wants to do every 6 weeks    200 mg dietary calcium and Vitamin D3 1000 mg daily    Been feeling good overall. Muscle weakness is not getting worse and has been feeling better since doing IVIG monthly  No rashes recently   -Good strength 5/5    Joints are achy- worse with the cold weather- knees and ankles  -Good ROM and no TTP  -Tylenol helps with pain    No longer having sciatica    Muscle strengthening exercises at home- been doing them. Feels like it helps her strength     Had  flu vaccination done    Labs 9/2023  ESR- >130  CRP- normal  CK- 239  Aldolase- normal  CBC- low H/H- 11.7/36.3  CMP- Potassium 3.3, calcium 10.4, total protein 11.3,   AST-48  ALT- normal  GFR- >60    Plan     -never had blood clot  -never had diverticulitis    Labs in 3 months    Will just continue xeljanz - 5 mg daily  We will get it approved for 5 mg bid since the disease flares up- and we will have to adjust the dose accordingly    We will change IVIG 2 G/KG BW EVERY 6 WEEKS    For the persistently elevated ESR- We will send off SPEP,ERVIN,free light chain ratio  We will monitor LFTs    Off steroids    Dermatology -f/u    Sun screen overtly emphasised    - continue muscle strengthening exercise daily    CBC,CMP,ESR,CRP,ck,aldolase today and in 3 months    Follow up in 3 months     I had referred her to rheum-derm clinic,but looks like she is stable enough to be seen by me and derm separately    Vaccines  Covid x 2  Booster needed  Flu utd  Pneumonia utd  Shingles     DXA-osteopenia -2020  DEXA 2022  LOW BONE MINERAL DENSITY WITH A SIGNIFICANT DECREASE OF 5% IN THE LUMBAR SPINE BMD COMPARED TO THE PRIOR STUDY.  THE ESTIMATED 10 YEAR PROBABILITY OF HIP FRACTURE IS 0.2% AND OF A MAJOR OSTEOPOROTIC FRACTURE 3.2% RESPECTIVELY USING FRAX.  WE GAVE HER  - 1200 mg dietary calcium and Vitamin D3 1000 mg daily    Cut the calcium to 600 mg daily because serum calcium is high  Vitamind d3 -doesn't know the dose                    Mammos are UTD      Answers submitted by the patient for this visit:  Rheumatology Questionnaire (Submitted on 6/17/2023)  mouth sores: No  trouble swallowing: No  unexpected weight change: No  genital sore: No

## 2023-10-31 NOTE — PROGRESS NOTES
10/26/2023     9:17 AM   Rapid3 Question Responses and Scores   MDHAQ Score 0.6   Psychologic Score 2.2   Pain Score 2   When you awakened in the morning OVER THE LAST WEEK, did you feel stiff? Yes   If Yes, please indicate the number of hours until you are as limber as you will be for the day 2   Fatigue Score 1   Global Health Score 1.5   RAPID3 Score 1.83     Answers submitted by the patient for this visit:  Rheumatology Questionnaire (Submitted on 10/26/2023)  fever: No  eye redness: No  mouth sores: No  headaches: No  shortness of breath: No  chest pain: No  trouble swallowing: No  diarrhea: No  constipation: No  unexpected weight change: No  genital sore: No  dysuria: No  During the last 3 days, have you had a skin rash?: No  Bruises or bleeds easily: No  cough: No

## 2023-11-02 ENCOUNTER — PATIENT MESSAGE (OUTPATIENT)
Dept: RHEUMATOLOGY | Facility: CLINIC | Age: 63
End: 2023-11-02
Payer: COMMERCIAL

## 2023-11-06 ENCOUNTER — PATIENT MESSAGE (OUTPATIENT)
Dept: INFECTIOUS DISEASES | Facility: HOSPITAL | Age: 63
End: 2023-11-06
Payer: COMMERCIAL

## 2023-11-15 DIAGNOSIS — M33.13 DERMATOMYOSITIS: ICD-10-CM

## 2023-11-15 RX ORDER — TOFACITINIB 5 MG/1
TABLET, FILM COATED ORAL
Qty: 60 TABLET | Refills: 0 | Status: SHIPPED | OUTPATIENT
Start: 2023-11-15 | End: 2024-02-08

## 2023-11-24 ENCOUNTER — INFUSION (OUTPATIENT)
Dept: INFECTIOUS DISEASES | Facility: HOSPITAL | Age: 63
End: 2023-11-24
Attending: INTERNAL MEDICINE
Payer: COMMERCIAL

## 2023-11-24 VITALS
HEART RATE: 107 BPM | OXYGEN SATURATION: 99 % | WEIGHT: 185.06 LBS | BODY MASS INDEX: 30.83 KG/M2 | TEMPERATURE: 98 F | DIASTOLIC BLOOD PRESSURE: 94 MMHG | HEIGHT: 65 IN | SYSTOLIC BLOOD PRESSURE: 173 MMHG | RESPIRATION RATE: 20 BRPM

## 2023-11-24 DIAGNOSIS — M33.13 DERMATOMYOSITIS: ICD-10-CM

## 2023-11-24 DIAGNOSIS — R13.19 ESOPHAGEAL DYSPHAGIA: Primary | ICD-10-CM

## 2023-11-24 PROCEDURE — 63600175 PHARM REV CODE 636 W HCPCS: Performed by: INTERNAL MEDICINE

## 2023-11-24 PROCEDURE — 96366 THER/PROPH/DIAG IV INF ADDON: CPT

## 2023-11-24 PROCEDURE — 25000003 PHARM REV CODE 250: Performed by: INTERNAL MEDICINE

## 2023-11-24 PROCEDURE — 96365 THER/PROPH/DIAG IV INF INIT: CPT

## 2023-11-24 RX ORDER — DIPHENHYDRAMINE HYDROCHLORIDE 50 MG/ML
50 INJECTION INTRAMUSCULAR; INTRAVENOUS
Status: CANCELLED
Start: 2023-12-01

## 2023-11-24 RX ORDER — FAMOTIDINE 10 MG/ML
20 INJECTION INTRAVENOUS
Status: COMPLETED | OUTPATIENT
Start: 2023-11-24 | End: 2023-11-24

## 2023-11-24 RX ORDER — ACETAMINOPHEN 325 MG/1
650 TABLET ORAL
Status: COMPLETED | OUTPATIENT
Start: 2023-11-24 | End: 2023-11-24

## 2023-11-24 RX ORDER — SODIUM CHLORIDE 0.9 % (FLUSH) 0.9 %
10 SYRINGE (ML) INJECTION
Status: DISCONTINUED | OUTPATIENT
Start: 2023-11-24 | End: 2023-11-24 | Stop reason: HOSPADM

## 2023-11-24 RX ORDER — SODIUM CHLORIDE 0.9 % (FLUSH) 0.9 %
10 SYRINGE (ML) INJECTION
Status: CANCELLED | OUTPATIENT
Start: 2023-12-01

## 2023-11-24 RX ORDER — DIPHENHYDRAMINE HYDROCHLORIDE 50 MG/ML
50 INJECTION INTRAMUSCULAR; INTRAVENOUS
Status: COMPLETED | OUTPATIENT
Start: 2023-11-24 | End: 2023-11-24

## 2023-11-24 RX ORDER — HEPARIN 100 UNIT/ML
500 SYRINGE INTRAVENOUS
Status: CANCELLED | OUTPATIENT
Start: 2023-12-01

## 2023-11-24 RX ORDER — ACETAMINOPHEN 325 MG/1
650 TABLET ORAL
Status: CANCELLED | OUTPATIENT
Start: 2023-12-01

## 2023-11-24 RX ORDER — FAMOTIDINE 10 MG/ML
20 INJECTION INTRAVENOUS
Status: CANCELLED | OUTPATIENT
Start: 2023-12-01

## 2023-11-24 RX ADMIN — DIPHENHYDRAMINE HYDROCHLORIDE 50 MG: 50 INJECTION INTRAMUSCULAR; INTRAVENOUS at 08:11

## 2023-11-24 RX ADMIN — HUMAN IMMUNOGLOBULIN G 85 G: 40 LIQUID INTRAVENOUS at 08:11

## 2023-11-24 RX ADMIN — ACETAMINOPHEN 650 MG: 325 TABLET ORAL at 08:11

## 2023-11-24 RX ADMIN — FAMOTIDINE 20 MG: 10 INJECTION, SOLUTION INTRAVENOUS at 08:11

## 2023-11-24 RX ADMIN — SODIUM CHLORIDE: 9 INJECTION, SOLUTION INTRAVENOUS at 08:11

## 2023-11-24 NOTE — PROGRESS NOTES
Arrived for Privigen infusion. Tylenol 650 mg po, Pepcid 20 mg IVP, and Benadryl 50 IVP administered 30 minutes prior to infusion.  Privigen initiated at the following rates with titration every 30 minutes; 25ml/hr-50ml/hr-101ml/hr-201 ml/hr, NS  250cc bag @ 25ml/hr concurrently with Privigen. Tolerated without any difficulty.

## 2023-12-06 ENCOUNTER — OFFICE VISIT (OUTPATIENT)
Dept: URGENT CARE | Facility: CLINIC | Age: 63
End: 2023-12-06
Payer: COMMERCIAL

## 2023-12-06 ENCOUNTER — PATIENT MESSAGE (OUTPATIENT)
Dept: RHEUMATOLOGY | Facility: CLINIC | Age: 63
End: 2023-12-06
Payer: COMMERCIAL

## 2023-12-06 VITALS
DIASTOLIC BLOOD PRESSURE: 79 MMHG | OXYGEN SATURATION: 100 % | HEIGHT: 65 IN | HEART RATE: 108 BPM | WEIGHT: 180 LBS | SYSTOLIC BLOOD PRESSURE: 135 MMHG | RESPIRATION RATE: 19 BRPM | TEMPERATURE: 100 F | BODY MASS INDEX: 29.99 KG/M2

## 2023-12-06 DIAGNOSIS — R68.89 FLU-LIKE SYMPTOMS: ICD-10-CM

## 2023-12-06 DIAGNOSIS — M79.621 PAIN IN RIGHT UPPER ARM: ICD-10-CM

## 2023-12-06 DIAGNOSIS — J10.1 INFLUENZA A: Primary | ICD-10-CM

## 2023-12-06 LAB
CTP QC/QA: YES
CTP QC/QA: YES
POC MOLECULAR INFLUENZA A AGN: POSITIVE
POC MOLECULAR INFLUENZA B AGN: NEGATIVE
SARS-COV-2 AG RESP QL IA.RAPID: NEGATIVE

## 2023-12-06 PROCEDURE — 87811 SARS-COV-2 COVID19 W/OPTIC: CPT | Mod: QW,S$GLB,, | Performed by: NURSE PRACTITIONER

## 2023-12-06 PROCEDURE — 87502 INFLUENZA DNA AMP PROBE: CPT | Mod: QW,S$GLB,, | Performed by: NURSE PRACTITIONER

## 2023-12-06 PROCEDURE — 99214 OFFICE O/P EST MOD 30 MIN: CPT | Mod: S$GLB,,, | Performed by: NURSE PRACTITIONER

## 2023-12-06 PROCEDURE — 99214 PR OFFICE/OUTPT VISIT, EST, LEVL IV, 30-39 MIN: ICD-10-PCS | Mod: S$GLB,,, | Performed by: NURSE PRACTITIONER

## 2023-12-06 PROCEDURE — 87811 SARS CORONAVIRUS 2 ANTIGEN POCT, MANUAL READ: ICD-10-PCS | Mod: QW,S$GLB,, | Performed by: NURSE PRACTITIONER

## 2023-12-06 PROCEDURE — 87502 POCT INFLUENZA A/B MOLECULAR: ICD-10-PCS | Mod: QW,S$GLB,, | Performed by: NURSE PRACTITIONER

## 2023-12-06 RX ORDER — OSELTAMIVIR PHOSPHATE 75 MG/1
75 CAPSULE ORAL 2 TIMES DAILY
Qty: 10 CAPSULE | Refills: 0 | Status: SHIPPED | OUTPATIENT
Start: 2023-12-06 | End: 2023-12-11

## 2023-12-06 RX ORDER — BENZONATATE 100 MG/1
100 CAPSULE ORAL 3 TIMES DAILY PRN
Qty: 30 CAPSULE | Refills: 0 | Status: SHIPPED | OUTPATIENT
Start: 2023-12-06 | End: 2023-12-16

## 2023-12-06 RX ORDER — IPRATROPIUM BROMIDE 21 UG/1
2 SPRAY, METERED NASAL 2 TIMES DAILY
Qty: 30 ML | Refills: 0 | Status: SHIPPED | OUTPATIENT
Start: 2023-12-06

## 2023-12-06 RX ORDER — MELOXICAM 15 MG/1
15 TABLET ORAL DAILY
Qty: 14 TABLET | Refills: 0 | Status: SHIPPED | OUTPATIENT
Start: 2023-12-06 | End: 2023-12-20

## 2023-12-06 NOTE — LETTER
December 6, 2023      [facility]  [address], [address]  [place] [address]  Phone: [phone]  Fax: [phone]       Patient: Ermelinda Verde   YOB: 1960  Date of Visit: 12/06/2023    To Whom It May Concern:    Herbie Verde  was at [facility] on 12/06/2023. The patient may return to work/school on 12/11/2023 with no restrictions. If you have any questions or concerns, or if I can be of further assistance, please do not hesitate to contact me.    Sincerely,     Ernestine Espinoza NP

## 2023-12-06 NOTE — PROGRESS NOTES
"Subjective:      Patient ID: Ermelinda Verde is a 63 y.o. female.    Vitals:  height is 5' 5" (1.651 m) and weight is 81.6 kg (180 lb). Her tympanic temperature is 99.9 °F (37.7 °C). Her blood pressure is 135/79 and her pulse is 108. Her respiration is 19 and oxygen saturation is 100%.     Chief Complaint: Cough and Arm Pain    Pt here with c/o cough and congestion and body aches. Pt c/o right arm pain. States she has a hx of dermatomyositis and she gets treatments every month.     Cough  This is a new problem. The current episode started yesterday. The problem has been gradually worsening. The cough is Productive of sputum and productive of purulent sputum. Associated symptoms include nasal congestion, postnasal drip, rhinorrhea and a sore throat. Pertinent negatives include no fever or headaches. Nothing aggravates the symptoms. Treatments tried: halls, robutussin. The treatment provided no relief. There is no history of asthma, bronchitis or environmental allergies.   Arm Pain   Incident onset: no incident. Incident location: none. The pain is present in the upper right arm. The quality of the pain is described as shooting. The pain radiates to the right neck. The pain is at a severity of 8/10. The pain is moderate. The pain has been Intermittent since the incident. Associated symptoms include muscle weakness (in the shoulder). Pertinent negatives include no numbness or tingling. Exacerbated by: sitting. Treatments tried: icy hot rub roll on. The treatment provided no relief.       Constitution: Negative for fever.   HENT:  Positive for postnasal drip and sore throat.    Respiratory:  Positive for cough.    Allergic/Immunologic: Negative for environmental allergies.   Neurological:  Negative for headaches and numbness.      Objective:     Physical Exam   Constitutional: She is oriented to person, place, and time. She appears well-developed. She is cooperative.  Non-toxic appearance. No distress.   HENT:   Head: " Normocephalic and atraumatic.   Ears:   Right Ear: Tympanic membrane, external ear and ear canal normal.   Left Ear: Tympanic membrane, external ear and ear canal normal.   Nose: Mucosal edema, rhinorrhea and congestion present. No nasal deformity. No epistaxis. Right sinus exhibits no maxillary sinus tenderness and no frontal sinus tenderness. Left sinus exhibits no maxillary sinus tenderness and no frontal sinus tenderness.   Mouth/Throat: Uvula is midline, oropharynx is clear and moist and mucous membranes are normal. No trismus in the jaw. Normal dentition. No uvula swelling. No oropharyngeal exudate, posterior oropharyngeal edema or posterior oropharyngeal erythema.   Eyes: Conjunctivae and lids are normal. No scleral icterus.   Neck: Trachea normal and phonation normal. Neck supple. There are no signs of injury. No torticollis present. No edema present. No erythema present. No neck rigidity present. No decreased range of motion present. No pain with movement present.   Cardiovascular: Normal rate, regular rhythm, normal heart sounds and normal pulses.   Pulmonary/Chest: Effort normal and breath sounds normal. No respiratory distress. She has no decreased breath sounds. She has no rhonchi.   Abdominal: Normal appearance.   Musculoskeletal: Normal range of motion.         General: No deformity. Normal range of motion.      Right shoulder: She exhibits normal range of motion, no tenderness, no bony tenderness, no swelling, no crepitus, no deformity, normal pulse and normal strength.      Cervical back: She exhibits no tenderness, no bony tenderness and no swelling.   Neurological: She is alert and oriented to person, place, and time. She exhibits normal muscle tone. Coordination normal.   Skin: Skin is warm, dry, intact, not diaphoretic and not pale.   Psychiatric: Her speech is normal and behavior is normal. Judgment and thought content normal.   Nursing note and vitals reviewed.      Assessment:     1.  Influenza A    2. Flu-like symptoms    3. Pain in right upper arm      Results for orders placed or performed in visit on 12/06/23   SARS Coronavirus 2 Antigen, POCT Manual Read   Result Value Ref Range    SARS Coronavirus 2 Antigen Negative Negative     Acceptable Yes    POCT Influenza A/B MOLECULAR   Result Value Ref Range    POC Molecular Influenza A Ag Positive (A) Negative, Not Reported    POC Molecular Influenza B Ag Negative Negative, Not Reported     Acceptable Yes      *Note: Due to a large number of results and/or encounters for the requested time period, some results have not been displayed. A complete set of results can be found in Results Review.      Plan:       Influenza A  -     oseltamivir (TAMIFLU) 75 MG capsule; Take 1 capsule (75 mg total) by mouth 2 (two) times daily. for 5 days  Dispense: 10 capsule; Refill: 0  -     benzonatate (TESSALON) 100 MG capsule; Take 1 capsule (100 mg total) by mouth 3 (three) times daily as needed for Cough.  Dispense: 30 capsule; Refill: 0  -     ipratropium (ATROVENT) 21 mcg (0.03 %) nasal spray; 2 sprays by Each Nostril route 2 (two) times daily.  Dispense: 30 mL; Refill: 0    Flu-like symptoms  -     SARS Coronavirus 2 Antigen, POCT Manual Read  -     POCT Influenza A/B MOLECULAR  -     benzonatate (TESSALON) 100 MG capsule; Take 1 capsule (100 mg total) by mouth 3 (three) times daily as needed for Cough.  Dispense: 30 capsule; Refill: 0    Pain in right upper arm  -     meloxicam (MOBIC) 15 MG tablet; Take 1 tablet (15 mg total) by mouth once daily. for 14 doses  Dispense: 14 tablet; Refill: 0

## 2023-12-11 ENCOUNTER — TELEPHONE (OUTPATIENT)
Dept: PRIMARY CARE CLINIC | Facility: CLINIC | Age: 63
End: 2023-12-11
Payer: COMMERCIAL

## 2023-12-11 NOTE — TELEPHONE ENCOUNTER
Patient is feeling a lot better. Started feeling better Friday 12/8. Some lingering congestion right now, but not too bad according to pt.  She will call with any questions or concerns

## 2023-12-11 NOTE — TELEPHONE ENCOUNTER
----- Message from Tripp Murray MD sent at 12/6/2023  5:10 PM CST -----  Seen at  today for flu A; could one of you check in with her early next week and make sure she's doing better? Thanks!

## 2023-12-11 NOTE — PROGRESS NOTES
Subjective:       Patient ID: Ermelinda Verde is a 63 y.o. female.    Chief Complaint: No chief complaint on file.      HPI Mrs Verde is a 62-year-old -American female who returns for follow-up of a history of recurrent triple negative left breast cancer - S/P chemo-immunotherapy.      Her course has been complicated by the development of auto-immune dermatomyositis.  She has been on IVIG every 8 weeks and daily tofacitinib.  Her symptoms have been under good control with that combination.      She had the flu last week - had body aches. Now back to normal.  With last week she has had some right shoulder pain in the trapezius area and suprascapular area and occasionally in the upper lateral arm.  These symptoms resolve with stretching and activity.  She denies any shortness of breath.  Her appetite and swallowing are good.         Onc History:  She developed a palpable abnormality in her left breast in January 2017 which she noted on self-examination.  A diagnostic mammogram on January 19 showed a greater than 1 cm nodule in the upper outer portion of left breast.  By ultrasound this was lobulated and hypoechoic measuring 1.75 x 1.51 x 1.96 cm.     On January 24, 2017 a core needle biopsy was performed which showed infiltrating ductal carcinoma, high grade.  The tumor was ER negative, MA negative, and HER-2 negative.  A follow-up ultrasound on December 6 showed 2.5 x 2.2 x 1.5 cm left breast mass.  There was no abnormality noted in the left axilla.     She underwent sentinel lymph node biopsy on February 22.  That showed 4 negative lymph nodes.     She had 4 cycles of  Wilbur-adjuvantTaxotere and Cytoxan completed  on 5/9/17.     On June 26 she underwent left mastectomy.  That revealed 2 foci of invasive high-grade carcinoma measuring 14 mm and 1.5 mm.  Margins were negative.    She completed 4 cycles of adjuvant Adriamycin on September 12, 2017.      In October 2018, she developed some left supraclavicular  lymphadenopathy which turned out to be recurrence.     A fine-needle aspirate of the lymph node was performed on October 19th.  That showed metastatic carcinoma consistent with breast primary which was ER negative, VT negative and HER 2-negative.    She then received 3 cycles of Nab paclitaxel and atezolizumab.  She last received treatment on January 3, 2019.    PET-CT in March, 2019 showed complete response.    She then had consolidative radiation therapy in May 2019.    Her treatment was complicated by the development of dermatomyositis which resulted in the lengthy hospitalization from January 22nd to February 13th, 2019.  She is followed by Rheumatology and has been treated with steroids, IVIG, and low-dose methotrexate, and Cell-cept.  She received rituximab therapy 4/13/2- and 4/27/20.    CT Abd/Pelvis in May 2023 - [facility]  Review of Systems   Constitutional:  Negative for appetite change and unexpected weight change.   HENT:  Negative for congestion, mouth sores and sinus pressure.    Eyes:  Negative for visual disturbance.   Respiratory:  Negative for cough and shortness of breath.    Cardiovascular:  Negative for chest pain.   Gastrointestinal:  Negative for abdominal pain, constipation and diarrhea.   Genitourinary:  Negative for frequency.   Musculoskeletal:  Positive for arthralgias (hips). Negative for back pain.        Right shoulder pain   Skin:  Negative for rash.   Neurological:  Negative for weakness and headaches.   Hematological:  Negative for adenopathy.   Psychiatric/Behavioral:  The patient is not nervous/anxious.        Objective:      Physical Exam  Vitals reviewed.   Constitutional:       General: She is not in acute distress.     Appearance: She is well-developed.   Eyes:      General: No scleral icterus.  Cardiovascular:      Rate and Rhythm: Normal rate and regular rhythm.   Pulmonary:      Effort: Pulmonary effort is normal.      Breath sounds: Normal breath sounds. No wheezing or rales.    Chest:   Breasts:     Right: Normal. No mass, nipple discharge or skin change.       Abdominal:      Palpations: Abdomen is soft. There is no mass.      Tenderness: There is no abdominal tenderness.   Lymphadenopathy:      Cervical: No cervical adenopathy.      Upper Body:      Right upper body: No supraclavicular or axillary adenopathy.      Left upper body: No supraclavicular or axillary adenopathy.   Skin:     Findings: No rash.      Comments: tigtness of skin in chest /neck with post radiation change, left chest wall   Neurological:      Mental Status: She is alert and oriented to person, place, and time.   Psychiatric:         Behavior: Behavior normal.         Thought Content: Thought content normal.       Assessment:       1. History of breast cancer in female        Plan:     RTC 6 M  Mammo scheduled 1/5/24        Route Chart for Scheduling    Med Onc Chart Routing      Follow up with physician 6 months.   Follow up with STEVIE    Infusion scheduling note    Injection scheduling note    Labs None   Scheduling:  Preferred lab:  Lab interval:     Imaging None      Pharmacy appointment No pharmacy appointment needed      Other referrals no referral to Oncology Primary Care needed -  no Massage appointment needed    No additional referrals needed

## 2023-12-12 ENCOUNTER — OFFICE VISIT (OUTPATIENT)
Dept: HEMATOLOGY/ONCOLOGY | Facility: CLINIC | Age: 63
End: 2023-12-12
Payer: MEDICARE

## 2023-12-12 VITALS
HEIGHT: 65 IN | OXYGEN SATURATION: 100 % | WEIGHT: 182.75 LBS | RESPIRATION RATE: 18 BRPM | SYSTOLIC BLOOD PRESSURE: 150 MMHG | HEART RATE: 98 BPM | BODY MASS INDEX: 30.45 KG/M2 | TEMPERATURE: 98 F | DIASTOLIC BLOOD PRESSURE: 81 MMHG

## 2023-12-12 DIAGNOSIS — Z85.3 HISTORY OF BREAST CANCER IN FEMALE: Primary | ICD-10-CM

## 2023-12-12 PROCEDURE — 3061F NEG MICROALBUMINURIA REV: CPT | Mod: CPTII,S$GLB,, | Performed by: INTERNAL MEDICINE

## 2023-12-12 PROCEDURE — 3008F PR BODY MASS INDEX (BMI) DOCUMENTED: ICD-10-PCS | Mod: CPTII,S$GLB,, | Performed by: INTERNAL MEDICINE

## 2023-12-12 PROCEDURE — 3066F PR DOCUMENTATION OF TREATMENT FOR NEPHROPATHY: ICD-10-PCS | Mod: CPTII,S$GLB,, | Performed by: INTERNAL MEDICINE

## 2023-12-12 PROCEDURE — 1159F MED LIST DOCD IN RCRD: CPT | Mod: CPTII,S$GLB,, | Performed by: INTERNAL MEDICINE

## 2023-12-12 PROCEDURE — 3061F PR NEG MICROALBUMINURIA RESULT DOCUMENTED/REVIEW: ICD-10-PCS | Mod: CPTII,S$GLB,, | Performed by: INTERNAL MEDICINE

## 2023-12-12 PROCEDURE — 3008F BODY MASS INDEX DOCD: CPT | Mod: CPTII,S$GLB,, | Performed by: INTERNAL MEDICINE

## 2023-12-12 PROCEDURE — 3079F PR MOST RECENT DIASTOLIC BLOOD PRESSURE 80-89 MM HG: ICD-10-PCS | Mod: CPTII,S$GLB,, | Performed by: INTERNAL MEDICINE

## 2023-12-12 PROCEDURE — 4010F PR ACE/ARB THEARPY RXD/TAKEN: ICD-10-PCS | Mod: CPTII,S$GLB,, | Performed by: INTERNAL MEDICINE

## 2023-12-12 PROCEDURE — 3079F DIAST BP 80-89 MM HG: CPT | Mod: CPTII,S$GLB,, | Performed by: INTERNAL MEDICINE

## 2023-12-12 PROCEDURE — 3077F SYST BP >= 140 MM HG: CPT | Mod: CPTII,S$GLB,, | Performed by: INTERNAL MEDICINE

## 2023-12-12 PROCEDURE — 1159F PR MEDICATION LIST DOCUMENTED IN MEDICAL RECORD: ICD-10-PCS | Mod: CPTII,S$GLB,, | Performed by: INTERNAL MEDICINE

## 2023-12-12 PROCEDURE — 99213 PR OFFICE/OUTPT VISIT, EST, LEVL III, 20-29 MIN: ICD-10-PCS | Mod: S$GLB,,, | Performed by: INTERNAL MEDICINE

## 2023-12-12 PROCEDURE — 99999 PR PBB SHADOW E&M-EST. PATIENT-LVL IV: CPT | Mod: PBBFAC,,, | Performed by: INTERNAL MEDICINE

## 2023-12-12 PROCEDURE — 3044F PR MOST RECENT HEMOGLOBIN A1C LEVEL <7.0%: ICD-10-PCS | Mod: CPTII,S$GLB,, | Performed by: INTERNAL MEDICINE

## 2023-12-12 PROCEDURE — 4010F ACE/ARB THERAPY RXD/TAKEN: CPT | Mod: CPTII,S$GLB,, | Performed by: INTERNAL MEDICINE

## 2023-12-12 PROCEDURE — 99999 PR PBB SHADOW E&M-EST. PATIENT-LVL IV: ICD-10-PCS | Mod: PBBFAC,,, | Performed by: INTERNAL MEDICINE

## 2023-12-12 PROCEDURE — 3077F PR MOST RECENT SYSTOLIC BLOOD PRESSURE >= 140 MM HG: ICD-10-PCS | Mod: CPTII,S$GLB,, | Performed by: INTERNAL MEDICINE

## 2023-12-12 PROCEDURE — 3044F HG A1C LEVEL LT 7.0%: CPT | Mod: CPTII,S$GLB,, | Performed by: INTERNAL MEDICINE

## 2023-12-12 PROCEDURE — 99213 OFFICE O/P EST LOW 20 MIN: CPT | Mod: S$GLB,,, | Performed by: INTERNAL MEDICINE

## 2023-12-12 PROCEDURE — 3066F NEPHROPATHY DOC TX: CPT | Mod: CPTII,S$GLB,, | Performed by: INTERNAL MEDICINE

## 2023-12-15 ENCOUNTER — HOSPITAL ENCOUNTER (OUTPATIENT)
Dept: RADIOLOGY | Facility: HOSPITAL | Age: 63
Discharge: HOME | End: 2023-12-15
Attending: INTERNAL MEDICINE
Payer: MEDICARE

## 2023-12-15 ENCOUNTER — PATIENT MESSAGE (OUTPATIENT)
Dept: RHEUMATOLOGY | Facility: CLINIC | Age: 63
End: 2023-12-15
Payer: COMMERCIAL

## 2023-12-15 DIAGNOSIS — Z12.31 ENCOUNTER FOR SCREENING MAMMOGRAM FOR MALIGNANT NEOPLASM OF BREAST: ICD-10-CM

## 2023-12-15 PROCEDURE — 77067 MAMMO DIGITAL SCREENING RIGHT WITH TOMO: ICD-10-PCS | Mod: 26,52,, | Performed by: RADIOLOGY

## 2023-12-15 PROCEDURE — 77067 SCR MAMMO BI INCL CAD: CPT | Mod: TC,52

## 2023-12-15 PROCEDURE — 77063 BREAST TOMOSYNTHESIS BI: CPT | Mod: 26,52,, | Performed by: RADIOLOGY

## 2023-12-15 PROCEDURE — 77067 SCR MAMMO BI INCL CAD: CPT | Mod: 26,52,, | Performed by: RADIOLOGY

## 2023-12-15 PROCEDURE — 77063 MAMMO DIGITAL SCREENING RIGHT WITH TOMO: ICD-10-PCS | Mod: 26,52,, | Performed by: RADIOLOGY

## 2023-12-18 DIAGNOSIS — R09.81 SINUS CONGESTION: Primary | ICD-10-CM

## 2023-12-18 RX ORDER — LEVOCETIRIZINE DIHYDROCHLORIDE 5 MG/1
5 TABLET, FILM COATED ORAL NIGHTLY
Qty: 30 TABLET | Refills: 8 | Status: SHIPPED | OUTPATIENT
Start: 2023-12-18

## 2023-12-26 ENCOUNTER — HOSPITAL ENCOUNTER (OUTPATIENT)
Dept: RADIOLOGY | Facility: HOSPITAL | Age: 63
Discharge: HOME | End: 2023-12-26
Attending: STUDENT IN AN ORGANIZED HEALTH CARE EDUCATION/TRAINING PROGRAM
Payer: MEDICARE

## 2023-12-26 ENCOUNTER — OFFICE VISIT (OUTPATIENT)
Dept: INTERNAL MEDICINE | Facility: CLINIC | Age: 63
End: 2023-12-26
Payer: COMMERCIAL

## 2023-12-26 VITALS
HEIGHT: 65 IN | HEART RATE: 100 BPM | BODY MASS INDEX: 31 KG/M2 | WEIGHT: 186.06 LBS | OXYGEN SATURATION: 97 % | SYSTOLIC BLOOD PRESSURE: 160 MMHG | RESPIRATION RATE: 16 BRPM | DIASTOLIC BLOOD PRESSURE: 80 MMHG

## 2023-12-26 DIAGNOSIS — M79.602 PAIN OF LEFT UPPER EXTREMITY: ICD-10-CM

## 2023-12-26 DIAGNOSIS — M79.602 PAIN OF LEFT UPPER EXTREMITY: Primary | ICD-10-CM

## 2023-12-26 PROCEDURE — 99212 PR OFFICE/OUTPT VISIT, EST, LEVL II, 10-19 MIN: ICD-10-PCS | Mod: S$GLB,,, | Performed by: STUDENT IN AN ORGANIZED HEALTH CARE EDUCATION/TRAINING PROGRAM

## 2023-12-26 PROCEDURE — 3079F DIAST BP 80-89 MM HG: CPT | Mod: CPTII,S$GLB,, | Performed by: STUDENT IN AN ORGANIZED HEALTH CARE EDUCATION/TRAINING PROGRAM

## 2023-12-26 PROCEDURE — 3066F PR DOCUMENTATION OF TREATMENT FOR NEPHROPATHY: ICD-10-PCS | Mod: CPTII,S$GLB,, | Performed by: STUDENT IN AN ORGANIZED HEALTH CARE EDUCATION/TRAINING PROGRAM

## 2023-12-26 PROCEDURE — 93971 EXTREMITY STUDY: CPT | Mod: 26,LT,, | Performed by: RADIOLOGY

## 2023-12-26 PROCEDURE — 3077F SYST BP >= 140 MM HG: CPT | Mod: CPTII,S$GLB,, | Performed by: STUDENT IN AN ORGANIZED HEALTH CARE EDUCATION/TRAINING PROGRAM

## 2023-12-26 PROCEDURE — 99212 OFFICE O/P EST SF 10 MIN: CPT | Mod: S$GLB,,, | Performed by: STUDENT IN AN ORGANIZED HEALTH CARE EDUCATION/TRAINING PROGRAM

## 2023-12-26 PROCEDURE — 1159F PR MEDICATION LIST DOCUMENTED IN MEDICAL RECORD: ICD-10-PCS | Mod: CPTII,S$GLB,, | Performed by: STUDENT IN AN ORGANIZED HEALTH CARE EDUCATION/TRAINING PROGRAM

## 2023-12-26 PROCEDURE — 4010F PR ACE/ARB THEARPY RXD/TAKEN: ICD-10-PCS | Mod: CPTII,S$GLB,, | Performed by: STUDENT IN AN ORGANIZED HEALTH CARE EDUCATION/TRAINING PROGRAM

## 2023-12-26 PROCEDURE — 3008F PR BODY MASS INDEX (BMI) DOCUMENTED: ICD-10-PCS | Mod: CPTII,S$GLB,, | Performed by: STUDENT IN AN ORGANIZED HEALTH CARE EDUCATION/TRAINING PROGRAM

## 2023-12-26 PROCEDURE — 3008F BODY MASS INDEX DOCD: CPT | Mod: CPTII,S$GLB,, | Performed by: STUDENT IN AN ORGANIZED HEALTH CARE EDUCATION/TRAINING PROGRAM

## 2023-12-26 PROCEDURE — 93971 EXTREMITY STUDY: CPT | Mod: TC,LT

## 2023-12-26 PROCEDURE — 3061F PR NEG MICROALBUMINURIA RESULT DOCUMENTED/REVIEW: ICD-10-PCS | Mod: CPTII,S$GLB,, | Performed by: STUDENT IN AN ORGANIZED HEALTH CARE EDUCATION/TRAINING PROGRAM

## 2023-12-26 PROCEDURE — 1159F MED LIST DOCD IN RCRD: CPT | Mod: CPTII,S$GLB,, | Performed by: STUDENT IN AN ORGANIZED HEALTH CARE EDUCATION/TRAINING PROGRAM

## 2023-12-26 PROCEDURE — 3077F PR MOST RECENT SYSTOLIC BLOOD PRESSURE >= 140 MM HG: ICD-10-PCS | Mod: CPTII,S$GLB,, | Performed by: STUDENT IN AN ORGANIZED HEALTH CARE EDUCATION/TRAINING PROGRAM

## 2023-12-26 PROCEDURE — 3079F PR MOST RECENT DIASTOLIC BLOOD PRESSURE 80-89 MM HG: ICD-10-PCS | Mod: CPTII,S$GLB,, | Performed by: STUDENT IN AN ORGANIZED HEALTH CARE EDUCATION/TRAINING PROGRAM

## 2023-12-26 PROCEDURE — 99999 PR PBB SHADOW E&M-EST. PATIENT-LVL III: CPT | Mod: PBBFAC,,, | Performed by: STUDENT IN AN ORGANIZED HEALTH CARE EDUCATION/TRAINING PROGRAM

## 2023-12-26 PROCEDURE — 3044F HG A1C LEVEL LT 7.0%: CPT | Mod: CPTII,S$GLB,, | Performed by: STUDENT IN AN ORGANIZED HEALTH CARE EDUCATION/TRAINING PROGRAM

## 2023-12-26 PROCEDURE — 99999 PR PBB SHADOW E&M-EST. PATIENT-LVL III: ICD-10-PCS | Mod: PBBFAC,,, | Performed by: STUDENT IN AN ORGANIZED HEALTH CARE EDUCATION/TRAINING PROGRAM

## 2023-12-26 PROCEDURE — 3066F NEPHROPATHY DOC TX: CPT | Mod: CPTII,S$GLB,, | Performed by: STUDENT IN AN ORGANIZED HEALTH CARE EDUCATION/TRAINING PROGRAM

## 2023-12-26 PROCEDURE — 93971 US UPPER EXTREMITY VEINS LEFT: ICD-10-PCS | Mod: 26,LT,, | Performed by: RADIOLOGY

## 2023-12-26 PROCEDURE — 4010F ACE/ARB THERAPY RXD/TAKEN: CPT | Mod: CPTII,S$GLB,, | Performed by: STUDENT IN AN ORGANIZED HEALTH CARE EDUCATION/TRAINING PROGRAM

## 2023-12-26 PROCEDURE — 3061F NEG MICROALBUMINURIA REV: CPT | Mod: CPTII,S$GLB,, | Performed by: STUDENT IN AN ORGANIZED HEALTH CARE EDUCATION/TRAINING PROGRAM

## 2023-12-26 PROCEDURE — 3044F PR MOST RECENT HEMOGLOBIN A1C LEVEL <7.0%: ICD-10-PCS | Mod: CPTII,S$GLB,, | Performed by: STUDENT IN AN ORGANIZED HEALTH CARE EDUCATION/TRAINING PROGRAM

## 2023-12-26 NOTE — PROGRESS NOTES
Subjective     Patient ID: Ermelinda Verde is a 63 y.o. female.    Chief Complaint: Arm Pain    Arm Pain   Pertinent negatives include no chest pain.     Patient is a 62 yo female patient who is here today for left arm pain over her bicep. She noted that her vein is distended and she feels a knot over her vein. Says it is a little better now. She also admits to tingling sensation in her 3rd and 4th digit on the left hand. Feels a tightness In that arm. She says does not lift anything heavy. States she felt a soreness, like it would feel after working out. Denies blood clots. This started 2 weeks ago. Did not apply ice, did not take any otc medications. No hx of blood clots and this never happened before. Has hx of left breast cancer with nodes removed.     Review of Systems   Constitutional:  Negative for chills and fever.   HENT:  Negative for rhinorrhea and sore throat.    Respiratory:  Negative for cough, chest tightness and shortness of breath.    Cardiovascular:  Negative for chest pain.   Gastrointestinal:  Negative for abdominal pain, blood in stool, constipation and diarrhea.   Genitourinary:  Negative for dysuria and hematuria.   Neurological:  Negative for dizziness, light-headedness and headaches.          Objective     Physical Exam  Vitals and nursing note reviewed.   Constitutional:       Appearance: Normal appearance.   Eyes:      Conjunctiva/sclera: Conjunctivae normal.   Cardiovascular:      Rate and Rhythm: Normal rate and regular rhythm.      Pulses: Normal pulses.      Heart sounds: Normal heart sounds.   Pulmonary:      Effort: Pulmonary effort is normal. No respiratory distress.   Musculoskeletal:      Right lower leg: No edema.      Left lower leg: No edema.      Comments: Left upper extremity without swelling or erythema. There is protrusion of the vein in the antecubital fossa. Mild tenderness with palpation over the bicep.    Skin:     General: Skin is warm.   Neurological:      Mental  Status: She is alert and oriented to person, place, and time.   Psychiatric:         Mood and Affect: Mood normal.         Behavior: Behavior normal.            Assessment and Plan     1. Pain of left upper extremity  Assessment & Plan:  Would like to rule out superficial or deep vein thrombosis. Describes pain as a soreness, could also be from her lifting something heavy including pots. Will order a left upper extremity doppler. 2+ radial pulses BL.    Orders:  -     US Upper Extremity Veins Left; Future; Expected date: 12/26/2023

## 2023-12-26 NOTE — ASSESSMENT & PLAN NOTE
Would like to rule out superficial or deep vein thrombosis. Describes pain as a soreness, could also be from her lifting something heavy including pots. Will order a left upper extremity doppler. 2+ radial pulses BL.

## 2023-12-27 ENCOUNTER — TELEPHONE (OUTPATIENT)
Dept: INTERNAL MEDICINE | Facility: CLINIC | Age: 63
End: 2023-12-27
Payer: COMMERCIAL

## 2023-12-27 ENCOUNTER — TELEPHONE (OUTPATIENT)
Dept: PRIMARY CARE CLINIC | Facility: CLINIC | Age: 63
End: 2023-12-27
Payer: COMMERCIAL

## 2023-12-27 NOTE — TELEPHONE ENCOUNTER
Appointment Request   Mianjacob WILLIAMSONJan Verde   Sent:   7:53 AM   To: [facility] Clinical Support Staff      Ermelinda Verde   MRN: [id number] : 1960   Pt Home: [phone]     Entered: [phone]        Message    Appointment Request From: Ermelinda Verde      With Provider: Reta Weeks MD [[facility] 65[facility] [place]]      Preferred Date Range: 2023 - 2023      Preferred Times: Any Time      Reason for visit: Pain in left arm      Comments:   Pain and swelling of veins

## 2023-12-27 NOTE — TELEPHONE ENCOUNTER
----- Message from Vane Mix MD sent at 12/27/2023  9:06 AM CST -----  Pls let patient know that there was no blood clot in her arm. Ask pt to give it a few days to see if the arm pain gets better. Apply ice 3-4 times a day, 10-15 mins each time.

## 2023-12-27 NOTE — TELEPHONE ENCOUNTER
Called PT. Informed PT that there was no blood clot in her arm. Informed PT to give it a few days to see if the arm pain gets better. Also informed PT to Apply ice 3-4 times a day, 10-15 mins each time.

## 2023-12-27 NOTE — TELEPHONE ENCOUNTER
----- Message from Zelda Hills sent at 12/27/2023  9:42 AM CST -----  Contact: Ermelinda   Patient is returning a phone call.  Who left a message for the patient: [place]   Does patient know what this is regarding:  results   Would you like a call back, or a response through your [facility] portal?: call back   Comments:

## 2024-01-02 ENCOUNTER — INFUSION (OUTPATIENT)
Dept: INFECTIOUS DISEASES | Facility: HOSPITAL | Age: 64
End: 2024-01-02
Payer: COMMERCIAL

## 2024-01-02 VITALS
SYSTOLIC BLOOD PRESSURE: 170 MMHG | BODY MASS INDEX: 30.8 KG/M2 | TEMPERATURE: 99 F | HEIGHT: 65 IN | HEART RATE: 110 BPM | OXYGEN SATURATION: 100 % | DIASTOLIC BLOOD PRESSURE: 90 MMHG | WEIGHT: 184.88 LBS | RESPIRATION RATE: 16 BRPM

## 2024-01-02 DIAGNOSIS — M33.90 DERMATOMYOSITIS: ICD-10-CM

## 2024-01-02 DIAGNOSIS — R13.19 ESOPHAGEAL DYSPHAGIA: Primary | ICD-10-CM

## 2024-01-02 DIAGNOSIS — M33.13 DERMATOMYOSITIS: ICD-10-CM

## 2024-01-02 PROCEDURE — 96365 THER/PROPH/DIAG IV INF INIT: CPT

## 2024-01-02 PROCEDURE — 25000003 PHARM REV CODE 250: Performed by: INTERNAL MEDICINE

## 2024-01-02 PROCEDURE — 63600175 PHARM REV CODE 636 W HCPCS: Mod: JZ,JG | Performed by: INTERNAL MEDICINE

## 2024-01-02 PROCEDURE — 96366 THER/PROPH/DIAG IV INF ADDON: CPT

## 2024-01-02 RX ORDER — DIPHENHYDRAMINE HYDROCHLORIDE 50 MG/ML
50 INJECTION, SOLUTION INTRAMUSCULAR; INTRAVENOUS
Status: CANCELLED
Start: 2024-01-16

## 2024-01-02 RX ORDER — FAMOTIDINE 10 MG/ML
20 INJECTION INTRAVENOUS
Status: CANCELLED | OUTPATIENT
Start: 2024-01-16

## 2024-01-02 RX ORDER — SODIUM CHLORIDE 0.9 % (FLUSH) 0.9 %
10 SYRINGE (ML) INJECTION
Status: DISCONTINUED | OUTPATIENT
Start: 2024-01-02 | End: 2024-01-02 | Stop reason: HOSPADM

## 2024-01-02 RX ORDER — ACETAMINOPHEN 325 MG/1
650 TABLET ORAL
Status: CANCELLED | OUTPATIENT
Start: 2024-01-16

## 2024-01-02 RX ORDER — SODIUM CHLORIDE 0.9 % (FLUSH) 0.9 %
10 SYRINGE (ML) INJECTION
Status: CANCELLED | OUTPATIENT
Start: 2024-01-16

## 2024-01-02 RX ORDER — HEPARIN 100 UNIT/ML
500 SYRINGE INTRAVENOUS
Status: CANCELLED | OUTPATIENT
Start: 2024-01-16

## 2024-01-02 RX ADMIN — SODIUM CHLORIDE: 9 INJECTION, SOLUTION INTRAVENOUS at 08:01

## 2024-01-02 RX ADMIN — HUMAN IMMUNOGLOBULIN G 85 G: 40 LIQUID INTRAVENOUS at 08:01

## 2024-01-02 NOTE — PROGRESS NOTES
Arrived for Privigen infusion. Tylenol 500 mg po, Pepcid 20 mg PO, and Benadryl 50 mg PO taken prior to arrival at clinic.      Privigen to be given at the following rates with titration every 30 minutes; 25ml/hr-50ml/hr-101ml/hr-201 ml/hr, NS  250cc bag @ 25ml/hr concurrently with Privigen.     Next appt given to patient. Limited head-to-toe assessment due to privacy issues and visit reason though the opportunity was given for patient to express any concerns

## 2024-01-22 ENCOUNTER — OFFICE VISIT (OUTPATIENT)
Dept: INTERNAL MEDICINE | Facility: CLINIC | Age: 64
End: 2024-01-22
Payer: COMMERCIAL

## 2024-01-22 VITALS
WEIGHT: 187.38 LBS | HEIGHT: 65 IN | BODY MASS INDEX: 31.22 KG/M2 | OXYGEN SATURATION: 99 % | SYSTOLIC BLOOD PRESSURE: 138 MMHG | DIASTOLIC BLOOD PRESSURE: 80 MMHG | HEART RATE: 105 BPM

## 2024-01-22 DIAGNOSIS — R26.9 ABNORMALITY OF GAIT AND MOBILITY: ICD-10-CM

## 2024-01-22 DIAGNOSIS — G62.0 CHEMOTHERAPY-INDUCED NEUROPATHY: ICD-10-CM

## 2024-01-22 DIAGNOSIS — Z00.00 ENCOUNTER FOR PREVENTIVE HEALTH EXAMINATION: Primary | ICD-10-CM

## 2024-01-22 DIAGNOSIS — E11.9 CONTROLLED TYPE 2 DIABETES MELLITUS WITHOUT COMPLICATION, WITHOUT LONG-TERM CURRENT USE OF INSULIN: ICD-10-CM

## 2024-01-22 DIAGNOSIS — E11.29 MICROALBUMINURIA DUE TO TYPE 2 DIABETES MELLITUS: ICD-10-CM

## 2024-01-22 DIAGNOSIS — M33.20 POLYMYOSITIS ASSOCIATED WITH AUTOIMMUNE DISEASE: ICD-10-CM

## 2024-01-22 DIAGNOSIS — Z85.3 HISTORY OF BREAST CANCER IN FEMALE: ICD-10-CM

## 2024-01-22 DIAGNOSIS — T45.1X5A CHEMOTHERAPY-INDUCED NEUROPATHY: ICD-10-CM

## 2024-01-22 DIAGNOSIS — M33.13 DERMATOMYOSITIS: ICD-10-CM

## 2024-01-22 DIAGNOSIS — Z00.00 ENCOUNTER FOR MEDICARE ANNUAL WELLNESS EXAM: ICD-10-CM

## 2024-01-22 DIAGNOSIS — R80.9 MICROALBUMINURIA DUE TO TYPE 2 DIABETES MELLITUS: ICD-10-CM

## 2024-01-22 DIAGNOSIS — D84.821 IMMUNOSUPPRESSION DUE TO DRUG THERAPY: ICD-10-CM

## 2024-01-22 DIAGNOSIS — M35.9 POLYMYOSITIS ASSOCIATED WITH AUTOIMMUNE DISEASE: ICD-10-CM

## 2024-01-22 DIAGNOSIS — Z79.899 IMMUNOSUPPRESSION DUE TO DRUG THERAPY: ICD-10-CM

## 2024-01-22 DIAGNOSIS — D64.9 ANEMIA, UNSPECIFIED TYPE: ICD-10-CM

## 2024-01-22 DIAGNOSIS — I70.0 AORTIC ATHEROSCLEROSIS: ICD-10-CM

## 2024-01-22 DIAGNOSIS — K21.00 GASTROESOPHAGEAL REFLUX DISEASE WITH ESOPHAGITIS WITHOUT HEMORRHAGE: ICD-10-CM

## 2024-01-22 DIAGNOSIS — I73.9 PAD (PERIPHERAL ARTERY DISEASE): ICD-10-CM

## 2024-01-22 PROCEDURE — 99999 PR PBB SHADOW E&M-EST. PATIENT-LVL V: CPT | Mod: PBBFAC,,, | Performed by: NURSE PRACTITIONER

## 2024-01-22 PROCEDURE — 1160F RVW MEDS BY RX/DR IN RCRD: CPT | Mod: CPTII,S$GLB,, | Performed by: NURSE PRACTITIONER

## 2024-01-22 PROCEDURE — 3075F SYST BP GE 130 - 139MM HG: CPT | Mod: CPTII,S$GLB,, | Performed by: NURSE PRACTITIONER

## 2024-01-22 PROCEDURE — 1159F MED LIST DOCD IN RCRD: CPT | Mod: CPTII,S$GLB,, | Performed by: NURSE PRACTITIONER

## 2024-01-22 PROCEDURE — 3079F DIAST BP 80-89 MM HG: CPT | Mod: CPTII,S$GLB,, | Performed by: NURSE PRACTITIONER

## 2024-01-22 PROCEDURE — G0439 PPPS, SUBSEQ VISIT: HCPCS | Mod: S$GLB,,, | Performed by: NURSE PRACTITIONER

## 2024-01-22 NOTE — PROGRESS NOTES
"Ermelinda Verde presented for a follow-up Medicare AWV today. The following components were reviewed and updated:    Medical history  Family History  Social history  Allergies and Current Medications  Health Risk Assessment  Health Maintenance  Care Team    **See Completed Assessments for Annual Wellness visit with in the encounter summary    The following assessments were completed:  Depression Screening  Cognitive function Screening    Timed Get Up Test  Whisper Test      Opioid documentation:      Patient does not have a current opioid prescription.          Vitals:    01/22/24 1431   BP: 138/80   BP Location: Right arm   Pulse: 105   SpO2: 99%   Weight: 85 kg (187 lb 6.3 oz)   Height: 5' 5" (1.651 m)     Body mass index is 31.18 kg/m².       Physical Exam  Vitals reviewed.   Constitutional:       Appearance: Normal appearance.   HENT:      Head: Normocephalic.   Cardiovascular:      Rate and Rhythm: Normal rate.   Pulmonary:      Effort: Pulmonary effort is normal.   Abdominal:      General: Bowel sounds are normal.   Musculoskeletal:         General: Normal range of motion.      Right lower leg: No edema.      Left lower leg: No edema.   Skin:     General: Skin is warm and dry.      Capillary Refill: Capillary refill takes less than 2 seconds.      Comments: Discoloration/swelling noted to bilateral hands, R>L.   Neurological:      Mental Status: She is alert and oriented to person, place, and time.   Psychiatric:         Mood and Affect: Mood normal.         Behavior: Behavior normal.         Thought Content: Thought content normal.         Judgment: Judgment normal.           Diagnoses and health risks identified today and associated recommendations/orders:  1. Encounter for preventive health examination  Assessments completed.  HM recommendations reviewed. Discussed covid booster and rsv vaccines.  F/u with PCP as instructed.    2. PAD (peripheral artery disease)  Chronic, stable on current regimen. Followed " by PCP.    3. Aortic atherosclerosis  Chronic, stable on current regimen. Followed by PCP. On statin.    4. Chemotherapy-induced neuropathy  Chronic, stable on current regimen. Followed by PCP / oncology.    5. Immunosuppression due to drug therapy  Chronic, stable on current regimen. Followed by rheumatology.    6. Polymyositis associated with autoimmune disease  Chronic, stable on current regimen. Followed by rheumatology.    7. Dermatomyositis  Chronic, stable on current regimen. Followed by rheumatology.    8. Controlled type 2 diabetes mellitus without complication, without long-term current use of insulin  Chronic, stable on current regimen. Followed by PCP.    9. Microalbuminuria due to type 2 diabetes mellitus  Chronic, stable on current regimen. Followed by PCP.    10. Gastroesophageal reflux disease with esophagitis without hemorrhage  Chronic, stable on current regimen. Followed by PCP.    11. Anemia, unspecified type  Chronic, stable on current regimen. Followed by PCP.    12. History of breast cancer in female  Chronic, stable on current regimen. Followed by hematology/oncology.    13. Abnormality of gait and mobility  Chronic, stable. No recent falls. Does not use assistive devices. Followed by PCP.    14. Encounter for Medicare annual wellness exam  Medicare HRA completed.  - Ambulatory Referral/Consult to Enhanced Annual Wellness Visit (eAWV)      Provided Devery with a 5-10 year written screening schedule and personal prevention plan. Recommendations were developed using the USPSTF age appropriate recommendations. Education, counseling, and referrals were provided as needed.  After Visit Summary printed and given to patient which includes a list of additional screenings\tests needed.    Follow up in about 1 year (around 1/22/2025) for Medicare AWV and with PCP as instructed.       Zehra Elena NP    I offered to discuss advanced care planning, including how to pick a person who would make  decisions for you if you were unable to make them for yourself, called a health care power of , and what kind of decisions you might make such as use of life sustaining treatments such as ventilators and tube feeding when faced with a life limiting illness recorded on a living will that they will need to know. (How you want to be cared for as you near the end of your natural life)     X Patient is interested in learning more about how to make advanced directives.  I provided them paperwork and offered to discuss this with them.

## 2024-01-22 NOTE — PATIENT INSTRUCTIONS
1. Schedule follow up with Reta Currie MD for end of Feb 2024.    2. RSV vaccine today.    3. Can consider covid booster in a couple of weeks.    Counseling and Referral of Other Preventative  (Italic type indicates deductible and co-insurance are waived)    Patient Name: Ermelinda Verde  Today's Date: 1/22/2024    Health Maintenance         Date Due Completion Date    RSV Vaccine (Age 60+ and Pregnant patients) (1 - 1-dose 60+ series) Never done ---    COVID-19 Vaccine (5 - 2023-24 season) 09/01/2023 9/21/2022    Hemoglobin A1c 03/28/2024 9/28/2023    Foot Exam 05/16/2024 5/16/2023    Lipid Panel 09/28/2024 9/28/2023    DEXA Scan 10/25/2024 10/25/2022    Diabetes Urine Screening 10/31/2024 10/31/2023    Eye Exam 11/08/2024 11/8/2023    Mammogram 12/15/2024 12/15/2023    High Dose Statin 12/26/2024 12/26/2023    Cervical Cancer Screening 05/17/2026 5/17/2023    TETANUS VACCINE 03/08/2028 3/8/2018    Colorectal Cancer Screening 07/29/2029 7/29/2019    Override on 11/8/2011: Done    Override on 1/1/2011: Done          No orders of the defined types were placed in this encounter.    The following information is provided to all patients.  This information is to help you find resources for any of the problems found today that may be affecting your health:                  Living healthy guide: www.[facility].[place].gov      Understanding Diabetes: www.diabetes.org      Eating healthy: www.cdc.gov/healthyweight      CDC home safety checklist: www.cdc.gov/steadi/patient.html      Agency on Aging: www.goea.[place].gov      Alcoholics anonymous (AA): www.aa.org      Physical Activity: www.allen.nih.gov/ji3mvhl      Tobacco use: www.quitwithusla.org

## 2024-02-08 ENCOUNTER — PATIENT MESSAGE (OUTPATIENT)
Dept: RHEUMATOLOGY | Facility: CLINIC | Age: 64
End: 2024-02-08

## 2024-02-08 ENCOUNTER — OFFICE VISIT (OUTPATIENT)
Dept: RHEUMATOLOGY | Facility: CLINIC | Age: 64
End: 2024-02-08
Payer: COMMERCIAL

## 2024-02-08 DIAGNOSIS — M33.13 DERMATOMYOSITIS: ICD-10-CM

## 2024-02-08 PROCEDURE — 1160F RVW MEDS BY RX/DR IN RCRD: CPT | Mod: CPTII,95,, | Performed by: INTERNAL MEDICINE

## 2024-02-08 PROCEDURE — 1159F MED LIST DOCD IN RCRD: CPT | Mod: CPTII,95,, | Performed by: INTERNAL MEDICINE

## 2024-02-08 PROCEDURE — 99214 OFFICE O/P EST MOD 30 MIN: CPT | Mod: 95,,, | Performed by: INTERNAL MEDICINE

## 2024-02-08 RX ORDER — TOFACITINIB 5 MG/1
TABLET, FILM COATED ORAL
Qty: 60 TABLET | Refills: 11 | Status: ACTIVE | OUTPATIENT
Start: 2024-02-08 | End: 2024-02-29

## 2024-02-08 NOTE — PROGRESS NOTES
10/26/2023     9:17 AM   Rapid3 Question Responses and Scores   MDHAQ Score 0.6   Psychologic Score 2.2   Pain Score 2   When you awakened in the morning OVER THE LAST WEEK, did you feel stiff? Yes   If Yes, please indicate the number of hours until you are as limber as you will be for the day 2   Fatigue Score 1   Global Health Score 1.5   RAPID3 Score 1.83     Answers submitted by the patient for this visit:  Rheumatology Questionnaire (Submitted on 10/26/2023)  fever: No  eye redness: No  mouth sores: No  headaches: No  shortness of breath: No  chest pain: No  trouble swallowing: No  diarrhea: No  constipation: No  unexpected weight change: No  genital sore: No  dysuria: No  During the last 3 days, have you had a skin rash?: No  Bruises or bleeds easily: No  cough: No        Answers submitted by the patient for this visit:  Rheumatology Questionnaire (Submitted on 2/7/2024)  fever: No  eye redness: No  mouth sores: No  headaches: No  shortness of breath: No  chest pain: No  trouble swallowing: No  diarrhea: No  constipation: Yes  unexpected weight change: No  genital sore: No  dysuria: No  During the last 3 days, have you had a skin rash?: Yes  Bruises or bleeds easily: No  cough: No

## 2024-02-08 NOTE — PROGRESS NOTES
RHEUMATOLOGY CLINIC FOLLOW UP VISIT  Chief complaints:-  To follow up for Myositis follow up     The patient location is: home  The chief complaint leading to consultation is: DM    Visit type: audiovisual    Face to Face time with patient: 20   minutes of total time spent on the encounter, which includes face to face time and non-face to face time preparing to see the patient (eg, review of tests), Obtaining and/or reviewing separately obtained history, Documenting clinical information in the electronic or other health record, Independently interpreting results (not separately reported) and communicating results to the patient/family/caregiver, or Care coordination (not separately reported).         Each patient to whom he or she provides medical services by telemedicine is:  (1) informed of the relationship between the physician and patient and the respective role of any other health care provider with respect to management of the patient; and (2) notified that he or she may decline to receive medical services by telemedicine and may withdraw from such care at any time.    Notes:       HPI:-  Ermelinda Javed a 63 y.o. pleasant female  with history of L sided infiltrating ductal carcinoma of the L breast (dx on Jan 2017), HTN, depression, gastritis, and recently diagnosed myositis (uncertain if it's autoimmune vs medication induced), present today with concern about myositis flare     Patient was initially send from hem/onc clinic for evaluation of possible inflammatory myositis.  Patient was hospitalized from 1/22/19 till 2/13/19 for myositis.      L breast cancer s/p completion of 4 cycles of demi-adjuvant taxotere and cytoxan (completed on 5/9/17) and mastectomy (6/26/17) and then 4 cycles of adjuvant Adriamycin (completed 9/12/17). In 10/2017 - patient developed L supraclavicular lymphadenopathy which FNA confirmed as reoccurrence of previously treated  breast cancer.      Patient started on Atelizumab/Abraxane on 11/7/18. Per chart review, patient noticed rashes on the dorsum of her hands on 11/11/18. Seen in urgent care and given topical steroid cream. Then received two more infusions on Atelizumab/Abraxane on 11/21/18 and 12/5/18. Started to notice puffiness around the eyes on 12/11/18. Received another Abraxane infusion on 12/12/18 but this time with hydrocortisone 50 mg which helped reduce the swelling around the eyes. Developed swelling of the face again on 12/15/18. Next infusion of Abraxane done on 12/19/18 with Solucortef and Atelizumab held. Abraxane given again on 1/3/19 with no IV steroid. Patient developed swelling of the face on 1/4/19 and went to urgent care and given short course of prednisone 20 mg BID. On 1/15/19, patient seen in hem/onc clinic with c/o of pressure and tightness around neck. CT scan of chest and neck did not reveal any vascular compression. Started on prednisone 60 mg with taper. On 1/22/18 patient with c/o proximal muscle weakness. CPK found to be elevated around 4k. Patient admitted and given solumedrol 80 mg IV on 1/22/18. Last infusion of Atelizumab on 12/19/18. Last infusion of Abraxane on 1/3/19. Given Solumedrol 1g x 1 on 1/23/18. Seen by Dermatology on 1/24/18 and biopsy done of skin rash. Given distribution of rashes, there was concern for dermatomyositis. Patient started on Solumedrol 125 mg IV BID at this time.      During her hospitalization patient was started on high dose steroid Solumedrol 80mg IV x 1, 1g x 1, and then 125mg BID until tapered down to medrol 48mg.  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  Patient was started on IVIG (400mg/kg/daily x 5 day) 1/29/19-2/2.   Patient started on MTX 20mg SQ (Feb 5 2019) with folic acid. MTX SQ was transitioned to PO because concern about rehab administration.  Patient failed swallow eval and PEG tube was placed.        Denies any family history of  autoimmune diseases.     No smoking, EtOH, recreational drug usage.     Denies any photosensitivity, joint swelling, unintentional weigh loss, abdominal pain, night sweats, CP, SOB.  +oral thrush.      Completed rehab at [facility].  Completed IVIG (2/28 and 3/1).  Had mild HA after infusion.  Doing well.  A lot stronger - able to do household chores since discharge.  Not back at baseline yet ~80%.  Mild weakness with increased activities.  Able to ambulate without assistance (but is still a fall precaution).  Tolerating diet via PEG tube.  Completed rehab.      MTX discontinued 2/18 with concern of toxicity (decrease blood counts and transaminatis).  Folic acid increased to 4mg daily.  Still on medrol 32mg daily with atorvaquone for PCP prophylaxis.  Bactrim d/c because of interaction with leucovorin.  Leucovorin 5mg daily started - Leucovorin discontinued.  Continues to be on folic acid 4mg daily     Radiation completed (28 days) completed May 8.       EGD/colonoscopy done on July 29 - normal.  PEG tube removed     Last PET scan (June 20) showed negative for metastasis.  At this time, will monitor per oncology and repeat PET scan in Sept.  No treatment needed at this time.      Rash was biopsied and evaluated by dermatology.   Biopsy report conclusive of dermatomyositis.  Was given topical steroid which provided minimal alleviation of symptoms.  +rajniuitius      6/2020    Completed Rituxan on 4/27 (1g x 2).  Had a flare after infusion requiring increase of steroid.  Since then patient had been doing well - improving of myalgia and rash.  Still having mild edema and generalized weakness (especially in the afternoon/evening).       Patient was started on HCQ - compliant on all home medications.  Continues to work out daily.  Finds most difficulty with getting in and out of the car.   Denies any dysphagia, alopecia, arthralgia, abdominal pain, CP, SOB, N/V, chills, or urinary symptoms.      7/2020    Rash all over her  body  Red rash on the left leg  Face once again has erythema and heliotrope rash  Prednisone down to 10 mg and looks like she has a flare again  On plaquenil 200 mg bid  Wears sunscreen  Avoids the direct sun    Most recent labs     Ck nml  Aldolase nml  ESR 53  CRP nml  Mild anemia  AST 67      9/2020    She started xeljanz 5 mg bid mid august  She feels well  Swelling is better  Her weakness and fatigue is all gone  Face still looks red   Chest is all better and upper arm is great  Itch and rash seems better   Rash on the legs is gone     CK,aldolase nml  CRP nml   now   AST 61  GFR nml  ALT nml    plts 145 but stable  H/h 11.6/35.3    But the white count has dropped to 2.70    It has been low on and off since 2019 so this is not new     She has a sinus issue going on   She has greenish d/c  She has a tinge of blood       10/12/2020  We cut the xeljanz dose to 5 mg daily  No more swelling  Face looks clear,not much redness  The eyelid is not puffy and not intensely erythematous   Along the nasal folds there is some redness but again not intense  On the MCPs and Dorsal hands she has some erythema which is more pinkish  Not itchy  Sometimes these pink areas can turn pigmented  Now complaint to sunscreen      White count has improved from 2.72 to 2.80  H/h 11.3/34.7  plts nml  ANC has improved from 0.6 to 1.4  Lymphocyte count 0.9    2/2021    She has fatigue if she has to do a lot of things  Facial rash is stable  On the hands : redness is stable  Sometimes left ankle and left knee is swollen,painful/tight   Not muscular weakness but she feels skinny  BMI is 29.39 though    White count 4.30  H/H 11.6/35.4  plts 223  Lymphocytes nml    AST went upto 52  Rest of LFTs ok  GFR nml  ESR > 120  CRP nml  CK,aldolase nml    2/2021 we made her xeljanz 7.5 mg daily  Gets IVIG     4/2021  Dermatology said    Patient appears to have persistent cutaneous rash of DM despite apparent controlled myopathy, currently managed  with IVIG and tofacitinib. No need for addition of systemic steroids with controlled myopathy and otherwise absence of systemic disease due to lack of efficacy in controlling cutaneous disease, side effect profile.   Currently with evidence of both nonvasculopathic cutaneous disease (heliotrophe, facial rash, Gottron's papules) as well as a degree vasculopathic disease (nailfold vascular changes, evidence digital pulp ulcers/lesions, associated pain at these locations). Overall patient doing well, states QOL not greatly affected by residual rashes.      Considerations for comorbid rosacea/seb derm at face (micropustules on exam today; some scaling at brows, nasal creases noted).      Recommendations:  - Strict sun protection measures always.   - Optimization of topicals as above: Elidel mixed 50/50 with ketoconazole cream BID PRN rash at face; Plexion wash daily at face; transition to Diprolene cream for rash at hands.   - Offered ILK to rash at hands, patient again declined.   - Consideration for addition of vasodilatory agent (e.g. nifedipine) for relief in associated pain at fingertip lesions.   - Otherwise c/w rheumatologic management with IVIG, tofacitinib.      Labs 4/2021    H/h 11.3/36.2  nml white count 5.27  plts nml    5/2021    Patient would like to consider steroid/intralesional kenalog injections  She had stopped xeljanz on 4/28 for the covid vaccine   White count was done 2 days after 4/28 which is on 4/30 and the count was 5.27  She got covid vaccine on 3/29   Stopped xeljanz till 4/6  Did the blood work on 4/8 and was on xeljanz at that time  So her white count was 3.80  Prior to that she was 4.30 and 5.19    CMP nml    10/2021    She is doing really well  She has not needed steroid creams or injections  Skin looks really good  She is not puffy,she is not weak    Left foot has a stabbing pain after she walks around for some time  Some aches and pains    8/2021    CRP 15.8  ESR > 130  She had sinus  issues at this time  Ck,aldolase nml  AST still 51  ALT nml  GFR nml  H/h 11.6/34.7  nml white count,4.90  nml plts  GGT nml  Vit d nml    1/2022    She is doing really really well  No more red and pink rashes  No more edema  On xeljanz 5 mg daily/was on 7.5 mg daily   Insurance is denying-so appeal     Labs     Ck,aldolase nml  CRP nml  ESR 67  CMP nml  CBC nml      5/2022    She took xeljanz 5 mg daily for 2 to 3 months and then didn't have medication for a month- thankfully has not flared in the skin and muscles  Today's labs are pending   IVIG - is still monthly     9/2022      On xeljanz 5 mg daily  She feels great  IVIG is monthly    Right hand -looks a little flared - this is her driving hand     Aldolase nml  -Stable  CRP nml  ESR Down to 67,it was > 120  CMP- AST upto 50  ALT nml  CBC - H/H 11.5/34.1,Nml plts,white count       12/2022    H/h 11.5/35  Nml white and plt count  Ck 238  Aldolase nml      3/2023    Calcium 11.1  She was taking 2 calciums a day  Protein is also high 11  Creat nml  AST down to 28  ALT nml    3/2023    IVIG is 1 g/kg monthly since may 2022  Xeljanz 5 mg daily - since sep 2022    Skin-doing great    For 6 months she has done half the dose and she has still done well    6/2023  She has muscle weakness with activity   This started after we dropped the dose of IVIG to every 8 weeks  Sciatica left side  Exam 5/5 strength  Bending over-some pain  Lifting baby- she has pain     Rash resolved     On xeljanz 5 mg daily        She has been taking crestor for a long time  So this might be a flare due to tapering off IVIG and not the statins    10/2023    Taking xeljanz - 5 mg daily  IVIG 2 G/KG BW EVERY 4 WEEKS  She wants to do every 6 weeks    200 mg dietary calcium and Vitamin D3 1000 mg daily    Been feeling good overall. Muscle weakness is not getting worse and has been feeling better since doing IVIG monthly  No rashes recently   -Good strength 5/5    Joints are achy- worse with  the cold weather- knees and ankles  -Good ROM and no TTP  -Tylenol helps with pain    No longer having sciatica    Muscle strengthening exercises at home- been doing them. Feels like it helps her strength     Had flu vaccination done    Labs 9/2023  ESR- >130  CRP- normal  CK- 239  Aldolase- normal  CBC- low H/H- 11.7/36.3  CMP- Potassium 3.3, calcium 10.4, total protein 11.3,   AST-48  ALT- normal  GFR- >60    2/2025    Myositis flared  Rash back on the face and the hands   Not in the sun  Last dose on IVIG 1/2/2024  PLAN WAS to taper it to every 6 weeks starting October 2023  Rash started in January and has become persistent  Xeljanz is also 5 mg daily        Review of Systems   Constitutional:  Negative for chills, diaphoresis, fever, malaise/fatigue and weight loss.   HENT:  Negative for congestion, ear discharge, ear pain, hearing loss, nosebleeds, sinus pain and tinnitus.    Eyes:  Positive for discharge. Negative for photophobia, pain and redness.   Respiratory:  Negative for cough, hemoptysis, sputum production, shortness of breath, wheezing and stridor.    Cardiovascular:  Negative for chest pain, palpitations, orthopnea, claudication, leg swelling and PND.   Gastrointestinal:  Positive for constipation. Negative for abdominal pain, diarrhea, heartburn, nausea and vomiting.   Genitourinary:  Negative for dysuria, frequency, hematuria and urgency.   Musculoskeletal:  Positive for myalgias. Negative for back pain, joint pain and neck pain.   Skin:  Positive for rash.   Neurological:  Negative for dizziness, tingling, tremors, weakness and headaches.   Endo/Heme/Allergies:  Does not bruise/bleed easily.   Psychiatric/Behavioral:  Negative for depression, hallucinations and suicidal ideas. The patient is not nervous/anxious and does not have insomnia.        Past Medical History:   Diagnosis Date    Anemia 2019    Anxiety 2018    Breast cancer 01/01/2017    left    Controlled type 2 diabetes mellitus without  complication, without long-term current use of insulin 2023    Depression     Dermatomyositis     Diverticulosis     Erosive esophagitis     Gastritis     Hepatic cyst     Hypertension     Immune disorder 2019    Joint pain 2019    Keloid cicatrix 2005    Thyroiditis     Vitamin B12 deficiency 3/8/2018       Past Surgical History:   Procedure Laterality Date    BREAST BIOPSY Left     BREAST RECONSTRUCTION Left 2017     SECTION      COLONOSCOPY  2011    repeat in 10 yrs.    COLONOSCOPY N/A 2019    Procedure: COLONOSCOPY;  Surgeon: Pernell Rosas MD;  Location: [facility] (4TH FLR);  Service: Endoscopy;  Laterality: N/A;  Patient would like to use her PEG tube for bowel prep and then have her PEG tube removed after EGD and colonoscopy procedures while she is still sedated.    D&C      ESOPHAGOGASTRODUODENOSCOPY N/A 2019    Procedure: EGD (ESOPHAGOGASTRODUODENOSCOPY);  Surgeon: Pernell Rosas MD;  Location: [facility] (2ND FLR);  Service: Endoscopy;  Laterality: N/A;    ESOPHAGOGASTRODUODENOSCOPY N/A 2019    Procedure: EGD (ESOPHAGOGASTRODUODENOSCOPY);  Surgeon: Pernell Rosas MD;  Location: [facility] (4TH FLR);  Service: Endoscopy;  Laterality: N/A;  EGD for follow-up of erosive esophagitis per Dr. Rosas    Patient would like to use her PEG tube for bowel prep and then have her PEG tube removed after EGD and colonoscopy procedures while she is still sedated.    INSERTION OF TUNNELED CENTRAL VENOUS CATHETER (CVC) WITH SUBCUTANEOUS PORT Right 10/31/2018    Procedure: [id number];  Surgeon: Deo Palencia MD;  Location: [address];  Service: General;  Laterality: Right;    MASTECTOMY Left 2017    MYOMECTOMY      PORTACATH PLACEMENT      SENTINEL LYMPH NODE BIOPSY  2017    left    SKIN BIOPSY  2019    TOTAL REDUCTION MAMMOPLASTY Right 2017    UPPER GASTROINTESTINAL ENDOSCOPY          Social History     Tobacco Use    Smoking status: Never     Passive exposure: Never     Smokeless tobacco: Never   Substance Use Topics    Alcohol use: Not Currently    Drug use: No       Family History   Problem Relation Age of Onset    Hypertension Mother     Heart disease Father     Hypertension Father     Breast cancer Sister 52    No Known Problems Sister     No Known Problems Brother     No Known Problems Brother     No Known Problems Brother     Thyroid disease Daughter         hyperthyroidism s/p thyroidectomy    No Known Problems Daughter     No Known Problems Son     Colon cancer Neg Hx     Ovarian cancer Neg Hx     Celiac disease Neg Hx     Cirrhosis Neg Hx     Colon polyps Neg Hx     Esophageal cancer Neg Hx     Inflammatory bowel disease Neg Hx     Liver cancer Neg Hx     Liver disease Neg Hx     Rectal cancer Neg Hx     Stomach cancer Neg Hx     Ulcerative colitis Neg Hx     Melanoma Neg Hx        Review of patient's allergies indicates:  No Known Allergies    There were no vitals filed for this visit.      Physical Exam  HENT:      Head: Normocephalic and atraumatic.   Eyes:      Pupils: Pupils are equal, round, and reactive to light.   Cardiovascular:      Rate and Rhythm: Normal rate and regular rhythm.      Heart sounds: Normal heart sounds.   Pulmonary:      Effort: Pulmonary effort is normal.      Breath sounds: Normal breath sounds.   Abdominal:      General: Bowel sounds are normal.      Palpations: Abdomen is soft.   Musculoskeletal:         General: Normal range of motion.      Cervical back: Normal range of motion and neck supple.   Skin:     General: Skin is warm and dry.      Findings: No erythema or rash.      Comments: Right hand - rash is back again,left hand not so prominent     Neurological:      Mental Status: She is alert and oriented to person, place, and time.      Gait: Gait is intact.   Psychiatric:         Mood and Affect: Mood and affect normal.         Cognition and Memory: Memory normal.         Judgment: Judgment normal.       Digital pitting  +    Labs:  Results for ROMEO PEREZ (MRN [id number]) as of 6/17/2020 10:12   Ref. Range 4/22/2020 11:51 4/24/2020 11:43 5/25/2020 09:34 6/10/2020 11:25 6/10/2020 11:26   WBC Latest Ref Range: 3.90 - 12.70 K/uL 4.30  3.60 (L) 4.60    RBC Latest Ref Range: 4.00 - 5.40 M/uL 4.49  4.52 4.47    Hemoglobin Latest Ref Range: 12.0 - 16.0 g/dL 12.1  12.5 12.4    Hematocrit Latest Ref Range: 37.0 - 48.5 % 38.2  38.7 38.4    MCV Latest Ref Range: 82 - 98 fL 85  86 86    MCH Latest Ref Range: 27.0 - 31.0 pg 27.0  27.7 27.8    MCHC Latest Ref Range: 32.0 - 36.0 g/dL 31.8 (L)  32.4 32.4    RDW Latest Ref Range: 11.5 - 14.5 % 16.0 (H)  17.2 (H) 17.3 (H)    Platelets Latest Ref Range: 150 - 350 K/uL 157  206 164    MPV Latest Ref Range: 7.4 - 10.4 fL 7.3 (L)  7.0 (L) 7.2 (L)    Gran% Latest Ref Range: 38.0 - 73.0 % 66.0  62.8 66.2    Gran # (ANC) Latest Ref Range: 1.8 - 7.7 K/uL 2.8  2.2 3.0    Lymph% Latest Ref Range: 18.0 - 48.0 % 15.3 (L)  15.5 (L) 14.6 (L)    Lymph # Latest Ref Range: 1.0 - 4.8 K/uL 0.7 (L)  0.6 (L) 0.7 (L)    Mono% Latest Ref Range: 4.0 - 15.0 % 11.2  17.7 (H) 14.2    Mono # Latest Ref Range: 0.3 - 1.0 K/uL 0.5  0.6 0.6    Eosinophil% Latest Ref Range: 0.0 - 8.0 % 6.5  3.1 3.9    Eos # Latest Ref Range: 0.0 - 0.5 K/uL 0.3  0.1 0.2    Basophil% Latest Ref Range: 0.0 - 1.9 % 1.0  0.9 1.1    Baso # Latest Ref Range: 0.00 - 0.20 K/uL 0.00  0.00 0.00    nRBC Latest Ref Range: 0 /100 WBC 0  0 0    Differential Method Unknown Automated  Automated Automated    Sed Rate Latest Ref Range: 0 - 30 mm/Hr 56 (H)  91 (H)  73 (H)   Sodium Latest Ref Range: 136 - 145 mmol/L 135 (L)  139 137    Potassium Latest Ref Range: 3.5 - 5.1 mmol/L 3.8  3.8 3.8    Chloride Latest Ref Range: 95 - 110 mmol/L 98  102 102    CO2 Latest Ref Range: 23 - 29 mmol/L 31 (H)  29 29    BUN, Bld Latest Ref Range: 7 - 17 mg/dL 17  12 13    Creatinine Latest Ref Range: 0.70 - 1.20 mg/dL 0.70  0.60 (L) 0.70    eGFR if non  Latest Ref  Range: >60 mL/min/1.73 m^2 >60  >60 >60    eGFR if  Latest Ref Range: >60 mL/min/1.73 m^2 >60  >60 >60    Glucose Latest Ref Range: 74 - 106 mg/dL 114 (H)  89 93    Calcium Latest Ref Range: 8.4 - 10.2 mg/dL 9.8  9.8 9.7    Alkaline Phosphatase Latest Ref Range: 38 - 145 U/L 65  63 58    PROTEIN TOTAL Latest Ref Range: 6.3 - 8.2 g/dL 9.3 (H)  8.9 (H) 8.4 (H)    Albumin Latest Ref Range: 3.5 - 5.2 g/dL 4.1  4.2 4.1    BILIRUBIN TOTAL Latest Ref Range: 0.2 - 1.3 mg/dL 0.4  0.5 0.4    AST Latest Ref Range: 14 - 36 U/L 85 (H)  64 (H) 62 (H)    ALT Latest Ref Range: 10 - 44 U/L 28  26 22    CRP Latest Ref Range: 0.0 - 10.0 mg/L <5.0  <5.0 <5.0    CPK Latest Ref Range: 30 - 135 U/L 115  79 101    Hemoglobin A1C External Latest Ref Range: 4.0 - 6.0 %     6.0   SARS-CoV2 (COVID-19) Qualitative PCR Latest Ref Range: Not Detected   Not Detected      Aldolase Latest Ref Range: < OR = 8.1 U/L 5.9  5.5  4.7     Medication List with Changes/Refills   Current Medications    CALCIUM CARBONATE (OS-ESTELA) 600 MG CALCIUM (1,500 MG) TAB    Take 600 mg by mouth once daily.    IPRATROPIUM (ATROVENT) 21 MCG (0.03 %) NASAL SPRAY    2 sprays by Each Nostril route 2 (two) times daily.    LEVOCETIRIZINE (XYZAL) 5 MG TABLET    TAKE 1 TABLET BY MOUTH EVERY DAY IN THE EVENING    MAGNESIUM 30 MG TAB    Take 1 tablet by mouth Daily.    MULTIVITAMIN CAPSULE    Take 1 capsule by mouth once daily.    NIFEDIPINE (PROCARDIA-XL) 30 MG (OSM) 24 HR TABLET    Take 1 tablet (30 mg total) by mouth once daily.    OLMESARTAN (BENICAR) 5 MG TAB    Take 1 tablet (5 mg total) by mouth once daily.    ROSUVASTATIN (CRESTOR) 10 MG TABLET    Take 1 tablet (10 mg total) by mouth once daily.    VITAMIN D (VITAMIN D3) 1000 UNITS TAB    Take 1,000 Units by mouth once daily.   Changed and/or Refilled Medications    Modified Medication Previous Medication    TOFACITINIB (XELJANZ) 5 MG TAB XELJANZ 5 mg Tab       Take 1 tablet by mouth twice a day    Take 1  tablet by mouth twice a day as directed by physician.       Assessment/Plans:-  59 y.o.F with history of L sided infiltrating ductal carcinoma of the L breast (dx on Jan 2017), HTN, depression, gastritis, and recently diagnosed myositis (uncertain if it's autoimmune vs medication induced), present today for follow up because of concern about myositis flare.     Patient started on Atelizumab/Abraxane on 11/7/18.  Patient developed swelling of the face on 1/4/19 and went to urgent care and given short course of prednisone 20 mg BID. On 1/15/19, patient seen in hem/onc clinic with c/o of pressure and tightness around neck. CT scan of chest and neck did not reveal any vascular compression. Started on prednisone 60 mg with taper. On 1/22/18 patient with c/o proximal muscle weakness. CPK found to be elevated around 4k. Patient admitted and given solumedrol 80 mg IV on 1/22/18. Last infusion of Atelizumab on 12/19/18. Last infusion of Abraxane on 1/3/19. Given Solumedrol 1g x 1 on 1/23/18. Seen by Dermatology on 1/24/18 and biopsy done of skin rash. Given distribution of rashes, there was concern for dermatomyositis. Patient started on Solumedrol 125 mg IV BID at that time.  Skin biopsy resulted consistent with dermatomyositis.     Labs: CPK and Aldolase normalized. +DARCY 1:640 speckled with negative profile.  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 quantTB: indeterminate, T-spot: negative     Spirometry: normal, normal diffusion capacity. FVC: 81%, DLCO: 114%     Patient exhibits signs of dermatomyositis (heliotropic rash and gottron's papules).   Dermatomyositis can be associated with malignancy.  MRI of LUE showed edema of the deltoid and biceps.  Exam with proximal muscle weakness: b/l deltoids 4/5, b/l iliopsoas 4.4/5. Skin  biopsy from 1/24/19 revealing vacuolar interface dermatitis consistent with dermatomyositis.      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   Given +TIF1 gamma positivity, most likely dermatomyositis is from underlying malignancy.      Failed 2nd swallow eval on 2/6/19. PEG placed 2/7/2019.     Current medications:  - prednisone 20mg   - IVIG Started Jan 2019 - last dose April 7  - Rituxan 1000mg x 2 (last dose April 27)  - HCQ 200mg BID      Esophageal biopsy - negative for viral infection.  Nonspecific chronic inflammation.     EGD/Colonoscopy (July 2019) - normal. PEG tube removed      Fiboscan done Aug 2019 - showed fatty liver with no scarring/damage.,      Failed Cellcept - worsening leukopenia, elevated LFTs, and other side effects without improvement of symptoms      Recent studies demonstrated increased efficacy in IVIG when spaced out (Q2w instead of Q4w)     Patient is an intermediate metabolizer based on TPMT.  Cannot start imuran.  Labs still neutropenic and thrombocytopenic at this time - uncertain of the cause (radiation induce BM fibrosis vs medication induce?)      Vaccination completed - Prevnar and Shingles (completed Aug 2019) and flu vaccination for this season (Aug 2019)      Dermatomyositis - currently on Rituxan and Prednisone 20mg daily.  Tolerating well and improvement of symptoms.  Plan is taper steroid and continue Rituxan 1000mg x 2 every 6 months.     It appears that patient continued to fail steroid tapering on multiple occasion.  Patient is uptodate on all CA screening - next PET scan is July 2020.  Other consideration for continued myalgia includes neurological condition such as MG.  Will other MG panel and referral to neurology.     If patient continues to failed steroid tapering - consideration for tacrolimus + IVIG, Xeljanz, plasmapharesis/plasma  exchange.       My addendum last time    60 year old female with dermatomyositis s/p PD1 inhibitor used for breast cancer  TIF1 gamma +    Low dose steroids didn't help    Rash+ muscle weakness+dysphagia     IVIG and steroids initial treatment    mtx caused LFT derangement,anemia and leukopenia   She didn't respond to IVIG, initially we gave IVIG 2 g/kg every 4 weeks and then 1 g/kg bw ever 2 weeks : poor response   She was on cellcept 500 mg daily and she had cytopenias   Imuran not an option since she is intermediate metaboliser     She had significant periorbital edema,rash on the neck and thighs    We didn't think she was responding to the treatment     So we gave her rituximab 1000 mg x 2    She did well and then she flared again may 2020    We had to go up on the dose to 30 mg and then tapered it to 20 mg  We started plaquenil    CT chest abdomen and pelvis nml  Colonoscopy nml  PET lymphadenopathy  Echo nml    Myasthenia gravis panel negative     Last time she needed prednisone 10 mg     She was weak and her skin had flared    Refractory dermatomyositis    We resumed IVIG and xeljanz 5 mg bid     She had done really well        9/2020    She started xeljanz 5 mg bid mid august  She feels well  Swelling is better  Her weakness and fatigue is all gone  Face still looks red   Chest is all better and upper arm is great  Itch and rash seems better   Rash on the legs is gone     CK,aldolase nml  CRP nml   now   AST 61  GFR nml  ALT nml    plts 145 but stable  H/h 11.6/35.3    But the white count has dropped to 2.70    It has been low on and off since 2019 so this is not new     Her lymphocyte count is 900 cells/mm3    Lymphopenia    In the controlled clinical trials, confirmed decreases in absolute lymphocyte counts below 500 cells/mm3 occurred in 0.04% of patients for the 5 mg twice daily and 10 mg twice daily XELJANZ groups combined during the first 3 months of exposure.    Confirmed lymphocyte counts less  than 500 cells/mm3 were associated with an increased incidence of treated and serious infections      Her neutrophil count is 1300 cells/mm3    Neutropenia    In the controlled clinical trials, confirmed decreases in ANC below 1000 cells/mm3 occurred in 0.07% of patients for the 5 mg twice daily and 10 mg twice daily XELJANZ groups combined during the first 3 months of exposure.    There were no confirmed decreases in ANC below 500 cells/mm3 observed in any treatment group.    There was no clear relationship between neutropenia and the occurrence of serious infections.    In the long-term safety population, the pattern and incidence of confirmed decreases in ANC remained consistent with what was seen in the controlled clinical trials        Lymphocyte Abnormalities    Treatment with XELJANZ was associated with initial lymphocytosis at one month of exposure followed by a gradual decrease in mean absolute lymphocyte counts below the baseline of approximately 10% during 12 months of therapy. Lymphocyte counts less than 500 cells/mm3 were associated with an increased incidence of treated and serious infections.    Avoid initiation of XELJANZ/XELJANZ XR treatment in patients with a low lymphocyte count (i.e., less than 500 cells/mm3). In patients who develop a confirmed absolute lymphocyte count less than 500 cells/mm3, treatment with XELJANZ/XELJANZ XR is not recommended.    Monitor lymphocyte counts at baseline and every 3 months thereafter.       Neutropenia    Treatment with XELJANZ was associated with an increased incidence of neutropenia (less than 2000 cells/mm3) compared to placebo.    Avoid initiation of XELJANZ/XELJANZ XR treatment in patients with a low neutrophil count (i.e., ANC less than 1000 cells/mm3). For patients who develop a persistent ANC of 500 to 1000 cells/mm3, interrupt XELJANZ/XELJANZ XR dosing until ANC is greater than or equal to 1000 cells/mm3. In patients who develop an ANC less than 500  cells/mm3, treatment with XELJANZ/XELJANZ XR is not recommended.    Monitor neutrophil counts at baseline and after 4-8 weeks of treatment and every 3 months thereafter.     Anemia    Avoid initiation of XELJANZ/XELJANZ XR treatment in patients with a low hemoglobin level (i.e., less than 9 g/dL). Treatment with XELJANZ/XELJANZ XR should be interrupted in patients who develop hemoglobin levels less than 8 g/dL or whose hemoglobin level drops greater than 2 g/dL on treatment.    So at this point we decided to continue the xeljanz 5 mg bid and IVIG     But we sent her to hematology and the counts further dropped     10/12/2020  We cut the xeljanz dose to 5 mg daily  No more swelling  Face looks clear,not much redness  The eyelid is not puffy and not intensely erythematous   Along the nasal folds there is some redness but again not intense  On the MCPs and Dorsal hands she has some erythema which is more pinkish  Not itchy  Sometimes these pink areas can turn pigmented  Now complaint to sunscreen      White count has improved from 2.72 to 2.80  H/h 11.3/34.7  plts nml  ANC has improved from 0.6 to 1.4  Lymphocyte count 0.9    Ck,aldolase nml  CRP nml  ESR 65      2/2021  Now completely off prednisone    Some hot flashes at nights  Not documented temperatures/some night sweats  Recent cancer screening negative  Ct chest,abdomen and pelvis and chest    She has fatigue if she has to do a lot of things  Facial rash is stable  On the hands : redness is stable  Sometimes left ankle and left knee is swollen,painful/tight   Not muscular weakness but she feels skinny  BMI is 29.39 though    White count 4.30  H/H 11.6/35.4  plts 223  Lymphocytes nml    AST went upto 52  Rest of LFTs ok  GFR nml  ESR > 120  CRP nml  CK,aldolase nml        2/2021 we made her xeljanz 7.5 mg daily  Gets IVIG     4/2021  Dermatology said    Patient appears to have persistent cutaneous rash of DM despite apparent controlled myopathy, currently managed  with IVIG and tofacitinib. No need for addition of systemic steroids with controlled myopathy and otherwise absence of systemic disease due to lack of efficacy in controlling cutaneous disease, side effect profile.   Currently with evidence of both nonvasculopathic cutaneous disease (heliotrophe, facial rash, Gottron's papules) as well as a degree vasculopathic disease (nailfold vascular changes, evidence digital pulp ulcers/lesions, associated pain at these locations). Overall patient doing well, states QOL not greatly affected by residual rashes.      Considerations for comorbid rosacea/seb derm at face (micropustules on exam today; some scaling at brows, nasal creases noted).      Recommendations:  - Strict sun protection measures always.   - Optimization of topicals as above: Elidel mixed 50/50 with ketoconazole cream BID PRN rash at face; Plexion wash daily at face; transition to Diprolene cream for rash at hands.   - Offered ILK to rash at hands, patient again declined.   - Consideration for addition of vasodilatory agent (e.g. nifedipine) for relief in associated pain at fingertip lesions.   - Otherwise c/w rheumatologic management with IVIG, tofacitinib.      Labs 4/2021    H/h 11.3/36.2  nml white count 5.27  plts nml    5/2021    Patient would like to consider steroid/intralesional kenalog injections  She had stopped xeljanz on 4/28 for the covid vaccine   White count was done 2 days after 4/28 which is on 4/30 and the count was 5.27  She got covid vaccine on 3/29   Stopped xeljanz till 4/6  Did the blood work on 4/8 and was on xeljanz at that time  So her white count was 3.80  Prior to that she was 4.30 and 5.19    CMP nml      5/2021  She has shown remarkable improvement   She used to be very erythematous on the face and the hands,but that has significantly resolved  She had rashes on the chest,upper back and legs and that has resolved  She had calcinosis and that has resolved  She has no muscle  weakness  All she has is mild erythema on the forehead,nose,around the nasolabial fold,heliotrope rash on the eyelids,gottrons papules on the MCPs and PIPs   She has digital pitting with no active ulcers  She has nail fold capillary changes     10/2021    She is doing really well  She has not needed steroid creams or injections  Skin looks really good  She is not puffy,she is not weak    Left foot has a stabbing pain after she walks around for some time  Some aches and pains-exam nml  May be neuropathy    8/2021    CRP 15.8  ESR > 130  She had sinus issues at this time  Ck,aldolase nml  AST still 51  ALT nml  GFR nml  H/h 11.6/34.7  nml white count,4.90  nml plts  GGT nml  Vit d nml    1/2022    She is doing really really well  No more red and pink rashes  No more edema  On xeljanz 5 mg daily/was on 7.5 mg daily   Insurance is denying-so appeal   Patient really needs to continue the current regimen since she has done really well,she has tried and failed many other drugs and stopping xeljanz will only cause flare up of the disease    Labs     Ck,aldolase nml  CRP nml  ESR 67  CMP nml  CBC nml      5/2022    She took xeljanz 5 mg daily for 2 to 3 months and then didn't have medication for a month- thankfully has not flared in the skin and muscles  Today's labs are pending - protein high,LFTs nml,CK slowly trending up,CRP mild elevation  IVIG - is still monthly   Exam-no rash and muscle strength normal      9/2022      On xeljanz 5 mg daily  She feels great  IVIG is monthly-but we have cut the dose to 1 g/kg monthly not 2 g/kg monthly    Right hand -looks a little flared - this is her driving hand     Aldolase nml  -Nrnjhp-sndrzj trending up  CRP nml  ESR Down to 67,it was > 120  CMP- AST upto 50  ALT nml  CBC - H/H 11.5/34.1,Nml plts,white count       12/2022    H/h 11.5/35  Nml white and plt count  Ck 238  Aldolase nml      3/2023    Calcium 11.1  She was taking 2 calciums a day  Protein is also high 11  Creat  nml  AST down to 28  ALT nml    3/2023    IVIG is 1 g/kg monthly since may 2022  Xeljanz 5 mg daily - since sep 2022  In September -we had decided to increase xeljanz to 7.5 mg but she didn't do it  She had a mild flare at that time    Skin-doing great    She didn't increase the dose but 5 mg is keeping her stable    For 6 months she has done half the dose and she has still done well    CRP 12  ESR 77  Aldolase 9.1  Ck 242  H/h 11.2/36.6    6/2023    She has muscle weakness with activity   This started after we dropped the dose of IVIG to every 8 weeks  Sciatica left side  Exam 5/5 strength  Bending over-some pain  Lifting baby- she has pain    Rash resolved    On xeljanz 5 mg daily      She has been taking crestor for a long time  So this might be a flare due to tapering off IVIG and not the statins        10/2023    Taking xeljanz - 5 mg daily  IVIG 2 G/KG BW EVERY 4 WEEKS  She wants to do every 6 weeks    200 mg dietary calcium and Vitamin D3 1000 mg daily    Been feeling good overall. Muscle weakness is not getting worse and has been feeling better since doing IVIG monthly  No rashes recently   -Good strength 5/5    Joints are achy- worse with the cold weather- knees and ankles  -Good ROM and no TTP  -Tylenol helps with pain    No longer having sciatica    Muscle strengthening exercises at home- been doing them. Feels like it helps her strength     Had flu vaccination done    Labs 9/2023  ESR- >130  CRP- normal  CK- 239  Aldolase- normal  CBC- low H/H- 11.7/36.3  CMP- Potassium 3.3, calcium 10.4, total protein 11.3,   AST-48  ALT- normal  GFR- >60    2/2024    Myositis flared  Rash back on the face and the hands   Not in the sun  Last dose on IVIG 1/2/2024  PLAN WAS to taper it to every 6 weeks starting October 2023  Rash started in January and has become persistent  Xeljanz is also 5 mg daily    SPEP/ERVIN polyclonal gammaglobulinemia   Kappa lambda ratio elevated due to the same     Plan     -never had blood  clot  -never had diverticulitis    Labs today  Labs     First double the xeljanz to 5 mg bid  If no response    We will change IVIG 2 G/KG BW EVERY 4 WEEKS    Off steroids    Dermatology -f/u    Sun screen overtly emphasised    - continue muscle strengthening exercise daily    CBC,CMP,ESR,CRP,ck,aldolase today and in 3 months    Follow up in 3 months     I had referred her to rheum-derm clinic,but looks like she is stable enough to be seen by me and derm separately    Vaccines  Covid x 2  Booster needed  Flu utd  Pneumonia utd  Shingles     DXA-osteopenia -2020  DEXA 2022  LOW BONE MINERAL DENSITY WITH A SIGNIFICANT DECREASE OF 5% IN THE LUMBAR SPINE BMD COMPARED TO THE PRIOR STUDY.  THE ESTIMATED 10 YEAR PROBABILITY OF HIP FRACTURE IS 0.2% AND OF A MAJOR OSTEOPOROTIC FRACTURE 3.2% RESPECTIVELY USING FRAX.  WE GAVE HER  - 1200 mg dietary calcium and Vitamin D3 1000 mg daily    Cut the calcium to 600 mg daily because serum calcium is high  Vitamind d3 -doesn't know the dose                    Mammos are UTD        Answers submitted by the patient for this visit:  Rheumatology Questionnaire (Submitted on 2/7/2024)  mouth sores: No  trouble swallowing: No  unexpected weight change: No  genital sore: No

## 2024-02-14 ENCOUNTER — INFUSION (OUTPATIENT)
Dept: INFECTIOUS DISEASES | Facility: HOSPITAL | Age: 64
End: 2024-02-14
Attending: INTERNAL MEDICINE
Payer: COMMERCIAL

## 2024-02-14 VITALS
RESPIRATION RATE: 16 BRPM | HEART RATE: 115 BPM | HEIGHT: 65 IN | SYSTOLIC BLOOD PRESSURE: 170 MMHG | OXYGEN SATURATION: 100 % | TEMPERATURE: 98 F | BODY MASS INDEX: 31.17 KG/M2 | WEIGHT: 187.06 LBS | DIASTOLIC BLOOD PRESSURE: 83 MMHG

## 2024-02-14 DIAGNOSIS — M33.13 DERMATOMYOSITIS: ICD-10-CM

## 2024-02-14 DIAGNOSIS — R13.19 ESOPHAGEAL DYSPHAGIA: Primary | ICD-10-CM

## 2024-02-14 PROCEDURE — 96366 THER/PROPH/DIAG IV INF ADDON: CPT

## 2024-02-14 PROCEDURE — 25000003 PHARM REV CODE 250: Performed by: INTERNAL MEDICINE

## 2024-02-14 PROCEDURE — 96365 THER/PROPH/DIAG IV INF INIT: CPT

## 2024-02-14 PROCEDURE — 63600175 PHARM REV CODE 636 W HCPCS: Mod: JZ,JG | Performed by: INTERNAL MEDICINE

## 2024-02-14 RX ORDER — HEPARIN 100 UNIT/ML
500 SYRINGE INTRAVENOUS
Status: CANCELLED | OUTPATIENT
Start: 2024-02-21

## 2024-02-14 RX ORDER — FAMOTIDINE 10 MG/ML
20 INJECTION INTRAVENOUS
Status: CANCELLED | OUTPATIENT
Start: 2024-02-21

## 2024-02-14 RX ORDER — ACETAMINOPHEN 325 MG/1
650 TABLET ORAL
Status: CANCELLED | OUTPATIENT
Start: 2024-02-21

## 2024-02-14 RX ORDER — SODIUM CHLORIDE 0.9 % (FLUSH) 0.9 %
10 SYRINGE (ML) INJECTION
Status: DISCONTINUED | OUTPATIENT
Start: 2024-02-14 | End: 2024-02-14 | Stop reason: HOSPADM

## 2024-02-14 RX ORDER — SODIUM CHLORIDE 0.9 % (FLUSH) 0.9 %
10 SYRINGE (ML) INJECTION
Status: CANCELLED | OUTPATIENT
Start: 2024-02-21

## 2024-02-14 RX ORDER — DIPHENHYDRAMINE HYDROCHLORIDE 50 MG/ML
50 INJECTION INTRAMUSCULAR; INTRAVENOUS
Status: CANCELLED
Start: 2024-02-21

## 2024-02-14 RX ADMIN — HUMAN IMMUNOGLOBULIN G 85 G: 40 LIQUID INTRAVENOUS at 08:02

## 2024-02-14 RX ADMIN — SODIUM CHLORIDE: 9 INJECTION, SOLUTION INTRAVENOUS at 08:02

## 2024-02-14 NOTE — PROGRESS NOTES
Arrived for Privigen infusion. Tylenol 500 mg po, Pepcid 20 mg PO, and Benadryl 50 mg PO taken prior to arrival at clinic.      Privigen to be given at the following rates with titration every 30 minutes; 25ml/hr-51ml/hr-102ml/hr-204 ml/hr, NS  250cc bag @ 25ml/hr concurrently with Privigen.     Next appt given to patient. Limited head-to-toe assessment due to privacy issues and visit reason though the opportunity was given for patient to express any concerns

## 2024-02-29 ENCOUNTER — PATIENT MESSAGE (OUTPATIENT)
Dept: RHEUMATOLOGY | Facility: CLINIC | Age: 64
End: 2024-02-29
Payer: COMMERCIAL

## 2024-02-29 DIAGNOSIS — M33.13 DERMATOMYOSITIS: Primary | ICD-10-CM

## 2024-02-29 RX ORDER — UPADACITINIB 15 MG/1
15 TABLET, EXTENDED RELEASE ORAL DAILY
Qty: 30 TABLET | Refills: 11 | Status: ACTIVE | OUTPATIENT
Start: 2024-02-29 | End: 2024-04-24

## 2024-03-09 DIAGNOSIS — I70.0 AORTIC ATHEROSCLEROSIS: ICD-10-CM

## 2024-03-09 DIAGNOSIS — E78.5 HYPERLIPIDEMIA, UNSPECIFIED HYPERLIPIDEMIA TYPE: ICD-10-CM

## 2024-03-09 DIAGNOSIS — I73.9 PAD (PERIPHERAL ARTERY DISEASE): ICD-10-CM

## 2024-03-11 RX ORDER — ROSUVASTATIN CALCIUM 10 MG/1
10 TABLET, COATED ORAL
Qty: 90 TABLET | Refills: 1 | Status: SHIPPED | OUTPATIENT
Start: 2024-03-11 | End: 2024-05-09 | Stop reason: SDUPTHER

## 2024-03-14 ENCOUNTER — TELEPHONE (OUTPATIENT)
Dept: PRIMARY CARE CLINIC | Facility: CLINIC | Age: 64
End: 2024-03-14
Payer: COMMERCIAL

## 2024-03-14 DIAGNOSIS — Z79.899 IMMUNOSUPPRESSION DUE TO DRUG THERAPY: ICD-10-CM

## 2024-03-14 DIAGNOSIS — D84.821 IMMUNOSUPPRESSION DUE TO DRUG THERAPY: ICD-10-CM

## 2024-03-14 DIAGNOSIS — M33.20 POLYMYOSITIS ASSOCIATED WITH AUTOIMMUNE DISEASE: ICD-10-CM

## 2024-03-14 DIAGNOSIS — E11.29 MICROALBUMINURIA DUE TO TYPE 2 DIABETES MELLITUS: ICD-10-CM

## 2024-03-14 DIAGNOSIS — M35.9 POLYMYOSITIS ASSOCIATED WITH AUTOIMMUNE DISEASE: ICD-10-CM

## 2024-03-14 DIAGNOSIS — I73.9 PAD (PERIPHERAL ARTERY DISEASE): Primary | ICD-10-CM

## 2024-03-14 DIAGNOSIS — R80.9 MICROALBUMINURIA DUE TO TYPE 2 DIABETES MELLITUS: ICD-10-CM

## 2024-03-14 DIAGNOSIS — N18.31 STAGE 3A CHRONIC KIDNEY DISEASE: ICD-10-CM

## 2024-03-14 NOTE — TELEPHONE ENCOUNTER
Appointment Request   Mianjacob WILLIAMSONJan Verde   Sent:   8:48 AM   To: [facility] Clinical Support Staff      Ermelinda Verde   MRN: [id number] : 1960   Pt Home: [phone]     Entered: [phone]        Message    Appointment Request From: Ermelinda Verde      With Provider: Reta Weeks MD [[facility] 65+ [facility] [place]]      Preferred Date Range: 3/18/2024 - 2024      Preferred Times: Any Time      Reason for visit: check up      Comments:   Lab work

## 2024-03-17 NOTE — TELEPHONE ENCOUNTER
Neuro CognitiveCognitive/Neuro/Behavioral WDL: all (Peripheral vision pt seeing nahdi lines, blurry vision centrally and also seeing large black dots. Slight headache over left eye. Slight left sided upper lip corner mouth droop. Symptoms started around 11, worst of symptoms lasted about 1 hour.)Level of Consciousness: alert (Symptoms have improved, but blurry vision continues. A&O4 pt had a ablation last wend and is on a blood thinner since the 6th.)Arousal Level: opens eyes spontaneouslyOrientation: oriented x 4Speech: clear; spontaneous; logicalMood/Behavior: calm; cooperative  [place] Coma ScaleBest Eye Response: 4-->(E4) spontaneousBest Motor Response: 6-->(M6) obeys commandsBest Verbal Response: 5-->(V5) orientedGlasgow Coma Scale Score: 15  Hand /Ankle StrengthHand , Left: strongHand , Right: strongDorsiflexion, Left: strongDorsiflexion, Right: strongPlantarflexion, Left: strongPlantarflexion, Right: strong   Please call and notify pt:    Her liver enzyme is borderline high like previously. Kidney function is normal. I think she was supposed to get her cholesterol checked but they didn't draw it. Can you schedule w/ pt. Also need BP check w/ Tabby in about a mo.

## 2024-03-27 ENCOUNTER — INFUSION (OUTPATIENT)
Dept: INFECTIOUS DISEASES | Facility: HOSPITAL | Age: 64
End: 2024-03-27
Attending: INTERNAL MEDICINE
Payer: COMMERCIAL

## 2024-03-27 VITALS
RESPIRATION RATE: 18 BRPM | DIASTOLIC BLOOD PRESSURE: 90 MMHG | TEMPERATURE: 98 F | HEART RATE: 99 BPM | OXYGEN SATURATION: 100 % | SYSTOLIC BLOOD PRESSURE: 162 MMHG | HEIGHT: 65 IN | BODY MASS INDEX: 31.02 KG/M2 | WEIGHT: 186.19 LBS

## 2024-03-27 DIAGNOSIS — R13.19 ESOPHAGEAL DYSPHAGIA: Primary | ICD-10-CM

## 2024-03-27 DIAGNOSIS — M33.13 DERMATOMYOSITIS: ICD-10-CM

## 2024-03-27 PROCEDURE — 96375 TX/PRO/DX INJ NEW DRUG ADDON: CPT

## 2024-03-27 PROCEDURE — 63600175 PHARM REV CODE 636 W HCPCS: Mod: JZ,JG | Performed by: INTERNAL MEDICINE

## 2024-03-27 PROCEDURE — 96366 THER/PROPH/DIAG IV INF ADDON: CPT

## 2024-03-27 PROCEDURE — 96365 THER/PROPH/DIAG IV INF INIT: CPT

## 2024-03-27 PROCEDURE — A4216 STERILE WATER/SALINE, 10 ML: HCPCS | Performed by: INTERNAL MEDICINE

## 2024-03-27 PROCEDURE — 25000003 PHARM REV CODE 250: Performed by: INTERNAL MEDICINE

## 2024-03-27 RX ORDER — SODIUM CHLORIDE 0.9 % (FLUSH) 0.9 %
10 SYRINGE (ML) INJECTION
Status: DISCONTINUED | OUTPATIENT
Start: 2024-03-27 | End: 2024-03-27 | Stop reason: HOSPADM

## 2024-03-27 RX ORDER — DIPHENHYDRAMINE HYDROCHLORIDE 50 MG/ML
50 INJECTION INTRAMUSCULAR; INTRAVENOUS
Status: COMPLETED | OUTPATIENT
Start: 2024-03-27 | End: 2024-03-27

## 2024-03-27 RX ORDER — DIPHENHYDRAMINE HYDROCHLORIDE 50 MG/ML
50 INJECTION INTRAMUSCULAR; INTRAVENOUS
Status: CANCELLED
Start: 2024-04-10

## 2024-03-27 RX ORDER — FAMOTIDINE 10 MG/ML
20 INJECTION INTRAVENOUS
Status: COMPLETED | OUTPATIENT
Start: 2024-03-27 | End: 2024-03-27

## 2024-03-27 RX ORDER — ACETAMINOPHEN 325 MG/1
650 TABLET ORAL
Status: COMPLETED | OUTPATIENT
Start: 2024-03-27 | End: 2024-03-27

## 2024-03-27 RX ORDER — ACETAMINOPHEN 325 MG/1
650 TABLET ORAL
Status: CANCELLED | OUTPATIENT
Start: 2024-04-10

## 2024-03-27 RX ORDER — SODIUM CHLORIDE 0.9 % (FLUSH) 0.9 %
10 SYRINGE (ML) INJECTION
Status: CANCELLED | OUTPATIENT
Start: 2024-04-10

## 2024-03-27 RX ORDER — HEPARIN 100 UNIT/ML
500 SYRINGE INTRAVENOUS
Status: CANCELLED | OUTPATIENT
Start: 2024-04-10

## 2024-03-27 RX ORDER — FAMOTIDINE 10 MG/ML
20 INJECTION INTRAVENOUS
Status: CANCELLED | OUTPATIENT
Start: 2024-04-10

## 2024-03-27 RX ADMIN — DIPHENHYDRAMINE HYDROCHLORIDE 50 MG: 50 INJECTION INTRAMUSCULAR; INTRAVENOUS at 08:03

## 2024-03-27 RX ADMIN — HUMAN IMMUNOGLOBULIN G 85 G: 40 LIQUID INTRAVENOUS at 09:03

## 2024-03-27 RX ADMIN — ACETAMINOPHEN 650 MG: 325 TABLET ORAL at 08:03

## 2024-03-27 RX ADMIN — SODIUM CHLORIDE: 9 INJECTION, SOLUTION INTRAVENOUS at 08:03

## 2024-03-27 RX ADMIN — FAMOTIDINE 20 MG: 10 INJECTION, SOLUTION INTRAVENOUS at 08:03

## 2024-03-27 RX ADMIN — Medication 10 ML: at 08:03

## 2024-03-27 NOTE — PROGRESS NOTES
Patient arrived for Privigen infusion. Tylenol 650 mg po, Pepcid 20 mg IVP, and Benadryl 50 IVP administered 30 minutes prior to infusion.  Privigen initiated at the following rates with titration every 30 minutes; 25ml/hr-51ml/hr-101ml/hr-203 ml/hr, with NS  250cc bag @ 25ml/hr concurrently running with Privigen. Patient tolerating infusion.     Next appointment scheduled.    Limited head-to-toe assessment due to privacy issues and visit reason though the opportunity was given for patient to express any concerns.    1443 - Patient completed Privigen infusion and tolerated well, without issues the whole entire patient stay. Patient ambulated and left the clinic with good steady gait.

## 2024-03-27 NOTE — LETTER
March 27, 2024      [facility]  [address]  [facility] [address]  Phone: [phone]       Patient: Ermelinda Verde   YOB: 1960  Date of Visit: 03/27/2024    To Whom It May Concern:    Herbie Verde  was at [facility] on 03/27/2024. The patient may return to work/school on 3/28/2024 with no restrictions. If you have any questions or concerns, or if I can be of further assistance, please do not hesitate to contact me.    Sincerely,    Zofia Gage RN

## 2024-04-10 ENCOUNTER — OFFICE VISIT (OUTPATIENT)
Dept: DERMATOLOGY | Facility: CLINIC | Age: 64
End: 2024-04-10
Payer: COMMERCIAL

## 2024-04-10 DIAGNOSIS — M33.13 DERMATOMYOSITIS: Primary | ICD-10-CM

## 2024-04-10 PROCEDURE — 99999 PR PBB SHADOW E&M-EST. PATIENT-LVL II: CPT | Mod: PBBFAC,,, | Performed by: STUDENT IN AN ORGANIZED HEALTH CARE EDUCATION/TRAINING PROGRAM

## 2024-04-10 PROCEDURE — 1160F RVW MEDS BY RX/DR IN RCRD: CPT | Mod: CPTII,S$GLB,, | Performed by: STUDENT IN AN ORGANIZED HEALTH CARE EDUCATION/TRAINING PROGRAM

## 2024-04-10 PROCEDURE — 4010F ACE/ARB THERAPY RXD/TAKEN: CPT | Mod: CPTII,S$GLB,, | Performed by: STUDENT IN AN ORGANIZED HEALTH CARE EDUCATION/TRAINING PROGRAM

## 2024-04-10 PROCEDURE — 99214 OFFICE O/P EST MOD 30 MIN: CPT | Mod: S$GLB,,, | Performed by: STUDENT IN AN ORGANIZED HEALTH CARE EDUCATION/TRAINING PROGRAM

## 2024-04-10 PROCEDURE — 1159F MED LIST DOCD IN RCRD: CPT | Mod: CPTII,S$GLB,, | Performed by: STUDENT IN AN ORGANIZED HEALTH CARE EDUCATION/TRAINING PROGRAM

## 2024-04-10 RX ORDER — BETAMETHASONE DIPROPIONATE 0.5 MG/G
OINTMENT TOPICAL 2 TIMES DAILY
Qty: 90 G | Refills: 3 | Status: SHIPPED | OUTPATIENT
Start: 2024-04-10

## 2024-04-10 NOTE — PROGRESS NOTES
Subjective:      Patient ID:  Ermelinda Verde is a 63 y.o. female who presents for   Chief Complaint   Patient presents with    Rash     dermatomyositis     Rash - Follow-up    F/u dermatomyositis. 12/ 2021 at which time patient had well controlled skin and muscle disease on IVIg, tofacitinib and topicals (elidel mixed with keto cream for face and diprolene for hands)    Main issue today is swelling of fingers and rash on hands, which was present initially but improved on therapy and recurred 1/2024  She decreased IVIg dosing from q4 weeks to q6 weeks 10/2023    Saw Dr. FUNEZ 2/2024 who felt muscles and hands were flaring--plan to keep ivig at q4 weeks and increase rinvoq from 5 mg qd to 5 mg bid    CPK as been stable in low 200s for > 1 year  Aldolase wnl 2/2024    Current treatment regimen:  IVIg every 6 weeks (per rheum)--most recent not stating will keep at every 4 weeks but patient still doing q6w  Tofacitinib but planning to switch to rinvoq in coming weeks (per rheum)  Nifedipine 30 mg extended release    Not currently using topicals    ILK attempted in 5/2021; patient does not wish to pursue again.     Followed by rheumatology (Dr. Madera), onc (hx breast cancer, see below)     Rash hx per 4/29/2021 derm visit:   Hx TIF1 positive dermatomyositis, diagnosed in early 2019. Patient has previously undergone two skin biopsies in 1/2019 and 12/2019, both consistent with the diagnosis of DM (vacuolar interface dermatitis with increased mucin).      Rash onset at dorsal hands in 11/2018 shortly after initiation of chemotherapy for recurrent breast cancer (see below detailed cancer history). Periorbital edema first noted in 12/2018. Proximal muscle weakness onset around 1/2019 with significantly elevated muscle enzymes prompting hospitalization requiring high dose steroids. Dermatology consulted during this hospitalization with concern for DM clinically, first biopsy performed and supportive as above.       Workup has shown no evidence of pulmonary, GI involvement. Last CT imaging of neck/chest/abdomen/pelvis in 2/2020 showing no evidence of cancer recurrence/metastasis; repeat imaging due in 5/2021. Recent diagnosis of autoimmune thyroiditis following acute onset cervical LAD, found to have normal TSH but positive TPO ab. Core bx performed on 4/5/2021 showing no definitive evidence of lymphoma or metastasis.      Current systemic treatment as below. Myositis appears controlled with recent normal muscle enzymes. Still with clinical skin disease, prompting dermatology referral.      Today patient reports residual rash only at face and bilateral dorsal hands/fingers. She reports facial rash is intermittently scaly, red, and itchy especially at brows and NL folds. Always red to some degree. Dorsal hand rash is asymptomatic. Nail folds and fingertips occasionally tender. Reports resolution of rash at upper back, chest, upper arms, lateral thighs.      Past treatment for DM:  - Systemic steroids (1/2019-11/2020)  - MTX (2019, discontinued due to toxicity concerns - cytopenias, transaminitis)  - Cellcept (discontinued due to toxicity concerns - cytopenias)  - Rituximab (1g x 2 completed on 4/27/2020, initially responded well but flared in 5/2020)  - Plaquenil   - tofacitinib     Other medication comments:  - Avoiding azathioprine due to status as immediate metabolizer based on TPMT     PMH:   Infiltrating ductal carcinoma of the L breast (triple negative, dx'd in 1/2017)  - s/p 4 cycles of demi-adjuvant toxotere and cytoxan (completed in 5/2017), mastectomy (6/2017), then 4 cycles of adjuvant adriamycin (completed in 5/2017). Still considered to be in remission from breast cancer. Following with onc every 6 months  - recurrent disease identified with L supraclavicular LAD (10/2017)  - s/p 3 cycles of atezolizumab +/- paclitaxel (early 11/2018- early 1/2019)  - XRT (completed 5/8/2019)  HTN, depression, gastritis       SH:  No smoking, alcohol, or recreational drug use     Review of Systems   Skin:  Positive for rash.       Objective:   Physical Exam   Constitutional: She appears well-developed and well-nourished. No distress.   Neurological: She is alert and oriented to person, place, and time. She is not disoriented.   Psychiatric: She has a normal mood and affect.   Skin:   Areas Examined (abnormalities noted in diagram):   Scalp / Hair Palpated and Inspected  Head / Face Inspection Performed  Neck Inspection Performed  Chest / Axilla Inspection Performed  Back Inspection Performed  RUE Inspected  LUE Inspection Performed  Nails and Digits Inspection Performed                    Diagram Legend     Erythematous scaling macule/papule c/w actinic keratosis       Vascular papule c/w angioma      Pigmented verrucoid papule/plaque c/w seborrheic keratosis      Yellow umbilicated papule c/w sebaceous hyperplasia      Irregularly shaped tan macule c/w lentigo     1-2 mm smooth white papules consistent with Milia      Movable subcutaneous cyst with punctum c/w epidermal inclusion cyst      Subcutaneous movable cyst c/w pilar cyst      Firm pink to brown papule c/w dermatofibroma      Pedunculated fleshy papule(s) c/w skin tag(s)      Evenly pigmented macule c/w junctional nevus     Mildly variegated pigmented, slightly irregular-bordered macule c/w mildly atypical nevus      Flesh colored to evenly pigmented papule c/w intradermal nevus       Pink pearly papule/plaque c/w basal cell carcinoma      Erythematous hyperkeratotic cursted plaque c/w SCC      Surgical scar with no sign of skin cancer recurrence      Open and closed comedones      Inflammatory papules and pustules      Verrucoid papule consistent consistent with wart     Erythematous eczematous patches and plaques     Dystrophic onycholytic nail with subungual debris c/w onychomycosis     Umbilicated papule    Erythematous-base heme-crusted tan verrucoid plaque consistent  with inflamed seborrheic keratosis     Erythematous Silvery Scaling Plaque c/w Psoriasis     See annotation      Assessment / Plan:        Dermatomyositis  -     betamethasone dipropionate (DIPROLENE) 0.05 % ointment; Apply topically 2 (two) times daily. Use to affected areas for up to 2 weeks then take a 1 week break or decrease to 3 times weekly. Do not apply to groin or face. Use to hands  Dispense: 90 g; Refill: 3         Dermatomyositis  Gottron's papules     Patient muscle disease appears to be largely controlled on current treatment. Skin disease on hands has been flaring since 1/2024 I/s/o decreasing frequency of IVIg to q6 weeks 10/2023.   Topically, not currently using anything  Wears sunscreen but is not wearing gloves while driving  Previously with evidence of both nonvasculopathic cutaneous disease (heliotrophe, facial rash, Gottron's papules) as well as a degree vasculopathic disease (nailfold vascular changes, evidence digital pulp ulcers/lesions, associated pain at these locations).       Recommendations:  - Strict sun protection measures always.   - Continued IVIG, tofacitinib per rheum--agree with Dr. Madera's last note to increase IVIg back to q4 weeks. Planning to change from tofacitinib to rinvoq for insurance reasons. Will monitor response  - Continued nifedipine per PCP.   - Previously using ketoconazole shampoo to face, scalp 1-2x weekly--not currently needing  - Elidel mixed 50/50 with ketoconazole cream BID PRN facial rash--not currently needing  - resume Diprolene ointment BID PRN hand rash.   - Counseled on potential SE of topical medication(s) and instructed on use.   - c/w monitoring for systemic involvement of DM per rheum  - continue following with oncology    Follow up if symptoms worsen or fail to improve.

## 2024-04-11 RX ORDER — DIPHENHYDRAMINE HYDROCHLORIDE 50 MG/ML
25 INJECTION INTRAMUSCULAR; INTRAVENOUS
Status: CANCELLED | OUTPATIENT
Start: 2024-04-11

## 2024-04-11 RX ORDER — ACETAMINOPHEN 325 MG/1
650 TABLET ORAL
Status: CANCELLED | OUTPATIENT
Start: 2024-04-11

## 2024-04-11 RX ORDER — HEPARIN 100 UNIT/ML
500 SYRINGE INTRAVENOUS
Status: CANCELLED | OUTPATIENT
Start: 2024-04-11

## 2024-04-11 RX ORDER — SODIUM CHLORIDE 0.9 % (FLUSH) 0.9 %
10 SYRINGE (ML) INJECTION
Status: CANCELLED | OUTPATIENT
Start: 2024-04-11

## 2024-04-11 RX ORDER — FAMOTIDINE 10 MG/ML
20 INJECTION INTRAVENOUS
Status: CANCELLED | OUTPATIENT
Start: 2024-04-11

## 2024-04-23 ENCOUNTER — PATIENT MESSAGE (OUTPATIENT)
Dept: ADMINISTRATIVE | Facility: HOSPITAL | Age: 64
End: 2024-04-23
Payer: COMMERCIAL

## 2024-04-23 ENCOUNTER — PATIENT MESSAGE (OUTPATIENT)
Dept: RHEUMATOLOGY | Facility: CLINIC | Age: 64
End: 2024-04-23
Payer: COMMERCIAL

## 2024-04-24 ENCOUNTER — PATIENT MESSAGE (OUTPATIENT)
Dept: RHEUMATOLOGY | Facility: CLINIC | Age: 64
End: 2024-04-24
Payer: COMMERCIAL

## 2024-04-30 ENCOUNTER — PATIENT MESSAGE (OUTPATIENT)
Dept: RHEUMATOLOGY | Facility: CLINIC | Age: 64
End: 2024-04-30
Payer: COMMERCIAL

## 2024-05-08 ENCOUNTER — INFUSION (OUTPATIENT)
Dept: INFECTIOUS DISEASES | Facility: HOSPITAL | Age: 64
End: 2024-05-08
Payer: COMMERCIAL

## 2024-05-08 VITALS
BODY MASS INDEX: 30.83 KG/M2 | OXYGEN SATURATION: 100 % | WEIGHT: 185.06 LBS | SYSTOLIC BLOOD PRESSURE: 164 MMHG | TEMPERATURE: 98 F | RESPIRATION RATE: 18 BRPM | HEIGHT: 65 IN | DIASTOLIC BLOOD PRESSURE: 87 MMHG

## 2024-05-08 DIAGNOSIS — M33.20 POLYMYOSITIS ASSOCIATED WITH AUTOIMMUNE DISEASE: Primary | ICD-10-CM

## 2024-05-08 DIAGNOSIS — M35.9 POLYMYOSITIS ASSOCIATED WITH AUTOIMMUNE DISEASE: Primary | ICD-10-CM

## 2024-05-08 PROCEDURE — 96365 THER/PROPH/DIAG IV INF INIT: CPT

## 2024-05-08 PROCEDURE — 96375 TX/PRO/DX INJ NEW DRUG ADDON: CPT

## 2024-05-08 PROCEDURE — 25000003 PHARM REV CODE 250: Performed by: INTERNAL MEDICINE

## 2024-05-08 PROCEDURE — 63600175 PHARM REV CODE 636 W HCPCS: Performed by: INTERNAL MEDICINE

## 2024-05-08 PROCEDURE — 96366 THER/PROPH/DIAG IV INF ADDON: CPT

## 2024-05-08 RX ORDER — ACETAMINOPHEN 325 MG/1
650 TABLET ORAL
Status: CANCELLED | OUTPATIENT
Start: 2024-06-05

## 2024-05-08 RX ORDER — DIPHENHYDRAMINE HYDROCHLORIDE 50 MG/ML
25 INJECTION INTRAMUSCULAR; INTRAVENOUS
Status: CANCELLED | OUTPATIENT
Start: 2024-06-05

## 2024-05-08 RX ORDER — SODIUM CHLORIDE 0.9 % (FLUSH) 0.9 %
10 SYRINGE (ML) INJECTION
Status: DISCONTINUED | OUTPATIENT
Start: 2024-05-08 | End: 2024-05-08 | Stop reason: HOSPADM

## 2024-05-08 RX ORDER — HEPARIN 100 UNIT/ML
500 SYRINGE INTRAVENOUS
Status: CANCELLED | OUTPATIENT
Start: 2024-06-05

## 2024-05-08 RX ORDER — ACETAMINOPHEN 325 MG/1
650 TABLET ORAL
Status: COMPLETED | OUTPATIENT
Start: 2024-05-08 | End: 2024-05-08

## 2024-05-08 RX ORDER — DIPHENHYDRAMINE HYDROCHLORIDE 50 MG/ML
25 INJECTION INTRAMUSCULAR; INTRAVENOUS
Status: COMPLETED | OUTPATIENT
Start: 2024-05-08 | End: 2024-05-08

## 2024-05-08 RX ORDER — HEPARIN 100 UNIT/ML
500 SYRINGE INTRAVENOUS
Status: DISCONTINUED | OUTPATIENT
Start: 2024-05-08 | End: 2024-05-08 | Stop reason: HOSPADM

## 2024-05-08 RX ORDER — SODIUM CHLORIDE 0.9 % (FLUSH) 0.9 %
10 SYRINGE (ML) INJECTION
Status: CANCELLED | OUTPATIENT
Start: 2024-06-05

## 2024-05-08 RX ORDER — FAMOTIDINE 10 MG/ML
20 INJECTION INTRAVENOUS
Status: COMPLETED | OUTPATIENT
Start: 2024-05-08 | End: 2024-05-08

## 2024-05-08 RX ORDER — FAMOTIDINE 10 MG/ML
20 INJECTION INTRAVENOUS
Status: CANCELLED | OUTPATIENT
Start: 2024-06-05

## 2024-05-08 RX ADMIN — FAMOTIDINE 20 MG: 10 INJECTION, SOLUTION INTRAVENOUS at 08:05

## 2024-05-08 RX ADMIN — ACETAMINOPHEN 650 MG: 325 TABLET ORAL at 08:05

## 2024-05-08 RX ADMIN — HUMAN IMMUNOGLOBULIN G 85 G: 40 LIQUID INTRAVENOUS at 09:05

## 2024-05-08 RX ADMIN — DIPHENHYDRAMINE HYDROCHLORIDE 25 MG: 50 INJECTION INTRAMUSCULAR; INTRAVENOUS at 08:05

## 2024-05-08 NOTE — PROGRESS NOTES
Patient arrived for Privigen infusion. Tylenol 650 mg po, Pepcid 20 mg IVP, and Benadryl 50 IVP administered 30 minutes prior to infusion.  Privigen initiated at the following rates with titration every 30 minutes; 25ml/hr-50ml/hr-101ml/hr-201 ml/hr, with NS  250cc bag @ 25ml/hr concurrently running with Privigen. Patient tolerated infusion well.    Next appointment scheduled.    Limited head-to-toe assessment due to privacy issues and visit reason though the opportunity was given for patient to express any concerns.      Patient arrives for infusion of Privigen as ordered by Dr. Caleb Madera. All benefits, risks, requirements, and alternatives should have been discussed with patient by ordering provider at the time the order was placed.

## 2024-05-09 ENCOUNTER — OFFICE VISIT (OUTPATIENT)
Dept: PRIMARY CARE CLINIC | Facility: CLINIC | Age: 64
End: 2024-05-09
Payer: COMMERCIAL

## 2024-05-09 VITALS
HEIGHT: 65 IN | SYSTOLIC BLOOD PRESSURE: 134 MMHG | BODY MASS INDEX: 30.45 KG/M2 | DIASTOLIC BLOOD PRESSURE: 82 MMHG | WEIGHT: 182.75 LBS | HEART RATE: 97 BPM | OXYGEN SATURATION: 99 %

## 2024-05-09 DIAGNOSIS — K86.9 PANCREATIC LESION: ICD-10-CM

## 2024-05-09 DIAGNOSIS — M33.13 DERMATOMYOSITIS: ICD-10-CM

## 2024-05-09 DIAGNOSIS — R22.42 MASS OF LEFT LOWER EXTREMITY: ICD-10-CM

## 2024-05-09 DIAGNOSIS — E21.3 HYPERPARATHYROIDISM: ICD-10-CM

## 2024-05-09 DIAGNOSIS — E11.9 CONTROLLED TYPE 2 DIABETES MELLITUS WITHOUT COMPLICATION, WITHOUT LONG-TERM CURRENT USE OF INSULIN: Primary | ICD-10-CM

## 2024-05-09 DIAGNOSIS — Z85.3 HISTORY OF BREAST CANCER: ICD-10-CM

## 2024-05-09 DIAGNOSIS — T45.1X5A CHEMOTHERAPY-INDUCED NEUROPATHY: ICD-10-CM

## 2024-05-09 DIAGNOSIS — E11.29 MICROALBUMINURIA DUE TO TYPE 2 DIABETES MELLITUS: ICD-10-CM

## 2024-05-09 DIAGNOSIS — I70.0 AORTIC ATHEROSCLEROSIS: ICD-10-CM

## 2024-05-09 DIAGNOSIS — G62.0 CHEMOTHERAPY-INDUCED NEUROPATHY: ICD-10-CM

## 2024-05-09 DIAGNOSIS — D64.9 NORMOCYTIC ANEMIA: ICD-10-CM

## 2024-05-09 DIAGNOSIS — E78.5 HYPERLIPIDEMIA, UNSPECIFIED HYPERLIPIDEMIA TYPE: ICD-10-CM

## 2024-05-09 DIAGNOSIS — R09.82 POSTNASAL DRIP: ICD-10-CM

## 2024-05-09 DIAGNOSIS — D84.9 IMMUNOSUPPRESSION: ICD-10-CM

## 2024-05-09 DIAGNOSIS — R80.9 MICROALBUMINURIA DUE TO TYPE 2 DIABETES MELLITUS: ICD-10-CM

## 2024-05-09 DIAGNOSIS — R74.01 ELEVATED AST (SGOT): ICD-10-CM

## 2024-05-09 DIAGNOSIS — E83.52 HYPERCALCEMIA: ICD-10-CM

## 2024-05-09 DIAGNOSIS — I10 BENIGN ESSENTIAL HYPERTENSION: ICD-10-CM

## 2024-05-09 PROCEDURE — 3079F DIAST BP 80-89 MM HG: CPT | Mod: CPTII,S$GLB,, | Performed by: INTERNAL MEDICINE

## 2024-05-09 PROCEDURE — 99999 PR PBB SHADOW E&M-EST. PATIENT-LVL IV: CPT | Mod: PBBFAC,,, | Performed by: INTERNAL MEDICINE

## 2024-05-09 PROCEDURE — 3066F NEPHROPATHY DOC TX: CPT | Mod: CPTII,S$GLB,, | Performed by: INTERNAL MEDICINE

## 2024-05-09 PROCEDURE — 1159F MED LIST DOCD IN RCRD: CPT | Mod: CPTII,S$GLB,, | Performed by: INTERNAL MEDICINE

## 2024-05-09 PROCEDURE — 3075F SYST BP GE 130 - 139MM HG: CPT | Mod: CPTII,S$GLB,, | Performed by: INTERNAL MEDICINE

## 2024-05-09 PROCEDURE — 3044F HG A1C LEVEL LT 7.0%: CPT | Mod: CPTII,S$GLB,, | Performed by: INTERNAL MEDICINE

## 2024-05-09 PROCEDURE — 99214 OFFICE O/P EST MOD 30 MIN: CPT | Mod: S$GLB,,, | Performed by: INTERNAL MEDICINE

## 2024-05-09 PROCEDURE — 4010F ACE/ARB THERAPY RXD/TAKEN: CPT | Mod: CPTII,S$GLB,, | Performed by: INTERNAL MEDICINE

## 2024-05-09 PROCEDURE — 1160F RVW MEDS BY RX/DR IN RCRD: CPT | Mod: CPTII,S$GLB,, | Performed by: INTERNAL MEDICINE

## 2024-05-09 PROCEDURE — 3008F BODY MASS INDEX DOCD: CPT | Mod: CPTII,S$GLB,, | Performed by: INTERNAL MEDICINE

## 2024-05-09 PROCEDURE — 3061F NEG MICROALBUMINURIA REV: CPT | Mod: CPTII,S$GLB,, | Performed by: INTERNAL MEDICINE

## 2024-05-09 RX ORDER — NIFEDIPINE 30 MG/1
30 TABLET, EXTENDED RELEASE ORAL DAILY
Qty: 90 TABLET | Refills: 3 | Status: SHIPPED | OUTPATIENT
Start: 2024-05-09

## 2024-05-09 RX ORDER — OLMESARTAN MEDOXOMIL 5 MG/1
5 TABLET ORAL DAILY
Qty: 90 TABLET | Refills: 3 | Status: SHIPPED | OUTPATIENT
Start: 2024-05-09 | End: 2025-05-09

## 2024-05-09 RX ORDER — ROSUVASTATIN CALCIUM 10 MG/1
10 TABLET, COATED ORAL DAILY
Qty: 90 TABLET | Refills: 1 | Status: SHIPPED | OUTPATIENT
Start: 2024-05-09

## 2024-05-09 RX ORDER — IPRATROPIUM BROMIDE 21 UG/1
2 SPRAY, METERED NASAL 2 TIMES DAILY
Qty: 30 ML | Refills: 0 | Status: SHIPPED | OUTPATIENT
Start: 2024-05-09

## 2024-05-09 NOTE — PROGRESS NOTES
"Subjective:       Patient ID: Ermelinda Verde is a 63 y.o. female.    Chief Complaint: annual    HPI  Started working as  at a school. Off by 3pm. Daily routine has changed.     eAWV done 1/22/24 w/ Zehra Elena, NP  Dexa 10/25/22 osteopenia  Chronic LLE mass on US 7/2022. Ordered CT but not done and order was canceled. At that time IV contrast shortage and pt enver got it doen. Reports over the years, it's smaller but stable in size for the last few yrs. No pain.     H/o breast CA s/p chemo 2017 and L mastectomy. Chemo induced neuropathy. Recurrent breast CA found in L supraclavicular LN in 2018 s/p chemoXRT 2019  LOV w/ Dr. Alex Reyna 12/12/23  MMG of R breast 12/15/23 neg.     DM2 - diet controlled.  Went through DM education a yr ago.   A1c 5/2/24 6.4; A1c 3/2023 6.7  MAC 5/2/24 neg. On olmesartan 5mg daily.   Last eye exam w/ Dr. Ambriz outside of [facility] 11/8/23. New glasses should be in soon.   Almost due for foot exam.     Dermatomyositis - on IVIG q6 weeks, xeljanz 5mg BID (inc to BID in Feb w/ Dr. FUNEZ), nifedipine xl 30mg qd. Started noticing gottron's papules again a few mo ago - will be restarting IVIG q 4 weeks instead of q6 weeks - didn't feel like xeljanz BID helped w/ the redness. No muscle weakness.   Derm - Dr. Orellana 4/10/24  Rheum - Dr. FUNEZ 2/8/24     HTN - Procardia XL 30mg daily, olmesartan 5mg daily (for microalbuminuria).  Checks BP at home - in the mornings, it's "good" - 120s/70s-80s.        HLD - crestor 10mg daily.   Aortic atherosclerosis on CT A/P 2023   9/28/23-->75 5/2/24  CPK stable w/ her dermatomyositis 2/8/24 212 and aldolase within normal limits.     Parathyroid hormone is mildly high 05/02/2024 at 76.6, new.  Vitamin-D within normal limits.  Mild hypercalcemia when corrected for albumin (10.62 5/2/24). No h/o kidney stones. Urinary calcium-calcium/Cr<0.01? Off of calcium already - was taking 2 pills daily.   Last US of thyroid 2021 w/ thyroiditis but no " "nodules.   Age = 63  Ca not above 1mg/dL above ULN (ULN 10.2)  eGFR>60  Never had kidney stones  DEXA 2022 osteopenia.    Normocytic anemia.  Hemoglobin range 11-11.8.  Most recently 05/02/2024 -11.8  Last ferritin level was done 2020 572  Not tired and not SOB/HUFFMAN.     AST is chronically mildly elevated.  Most recent CT scan in 2023 shows simple liver cysts.  Likely due to elevated CPK. No weakness.     Stable 0.3cm lesion pancreatic tail on CT abd/pelvis 2023 compared to 2022 and 2021.   On CT 2023, caught endometrial thickening. Pelvic US w/ borderline endometrial thickness. Pt couldn't get soon appt at [facility] so should've followed up w/ outside OB/GYN for further eval - Dr. Dee Dee Shepherd in [place]. Pt reports had biopsy and "everything is fine"    In the AM, postnasal drip and needs to spit up. But no issues otherwise. Out of astelin, which helps.     Review of Systems   Constitutional:  Negative for activity change and unexpected weight change.   HENT:  Positive for hearing loss. Negative for rhinorrhea and trouble swallowing.    Eyes:  Positive for visual disturbance. Negative for discharge.   Respiratory:  Negative for chest tightness and wheezing.    Cardiovascular:  Negative for chest pain and palpitations.   Gastrointestinal:  Positive for constipation. Negative for blood in stool, diarrhea and vomiting.   Endocrine: Negative for polydipsia and polyuria.   Genitourinary:  Negative for difficulty urinating, dysuria, hematuria and menstrual problem.   Musculoskeletal:  Positive for joint swelling. Negative for arthralgias and neck pain.   Neurological:  Negative for weakness and headaches.   Psychiatric/Behavioral:  Negative for confusion and dysphoric mood.          Objective:      Physical Exam    /82 (BP Location: Right arm, Patient Position: Sitting, BP Method: Large (Manual))   Pulse 97   Ht 5' 5" (1.651 m)   Wt 82.9 kg (182 lb 12.2 oz)   LMP  (LMP Unknown)   SpO2 99%   BMI 30.41 kg/m² "     GEN - A+OX4, NAD   HEENT - PERRL, EOMI, OP clear. Some redness on the eyelids. Tauter skin on the neck.   Neck - No thyromegaly or cervical LAD. No thyroid masses felt.  CV - RRR, no m/r   Chest - CTAB, no wheezing or rhonchi or crackles.  Abd - S/NT/ND/+BS.   Ext - 2+BDP and radial pulses. No C/C/E.  Protective Sensation (w/ 10 gram monofilament):  Right: Decreased  Left: Decreased    Visual Inspection:  Normal -  Bilateral    Pedal Pulses:   Right: Present  Left: Present    Posterior Tibialis Pulses:   Right:Present  Left: Present    Neuro - 5/5 BUE and BLE strength. Normal gait.   MSK - scoliosis. L mid medial lower leg w/ a quarter sized hard lump. R LE w/ bruise assoc w/ bump (smaller and due to trauma)  LN - No axillary LAD appreciated.  Skin - gottron's papules on the hands. Radiation changes of the skin on the mid/L chest area.     Previous labs reviewed.    Assessment/Plan     Diagnoses and all orders for this visit:    Controlled type 2 diabetes mellitus without complication, without long-term current use of insulin - diet controlled. Inc exercise. Decrease carbs.     History of breast cancer - MMG UTD and neg.   -     CT Leg (Tibia-Fibula) Wtih Contrast Left; Future    Chemotherapy-induced neuropathy - stable.     Dermatomyositis - on xeljanz BID (didn't feel like it helped w/ rash) and IVIG - about to be inc to q 4 weeks instead of q 6 weeks.     Immunosuppression - no active infections.     Benign essential hypertension - Stable and controlled. Continue current medications.  -     NIFEdipine (PROCARDIA-XL) 30 MG (OSM) 24 hr tablet; Take 1 tablet (30 mg total) by mouth once daily.  -     olmesartan (BENICAR) 5 MG Tab; Take 1 tablet (5 mg total) by mouth once daily.    Hyperlipidemia, unspecified hyperlipidemia type - patient had a question on why she was on rosuvastatin.  Aortic atherosclerosis explained.  This is to prevent cardiovascular events in the next 10 years.  -     rosuvastatin (CRESTOR) 10  MG tablet; Take 1 tablet (10 mg total) by mouth once daily.    Aortic atherosclerosis  -     rosuvastatin (CRESTOR) 10 MG tablet; Take 1 tablet (10 mg total) by mouth once daily.    Hyperparathyroidism - newly and mildly elevated.  Previously normal.  Chronic hypercalcemia.  Has been off of calcium supplements over-the-counter.  Currently does not meet criteria for parathyroidectomy even if primary hyper parathyroidism is found.  Will continue to monitor.  Bone density scan due later this year.  -     US Soft Tissue Head Neck; Future  -     Calcium, Timed Urine [facility]; 24 Hours; Future    Hypercalcemia - as above.    Normocytic anemia - likely anemia of chronic disease.  Asymptomatic.  Hemoglobin is stable.    Elevated AST (SGOT) - CT scan of the abdomen and pelvis stable.  Elevated AST be due to elevated CPK.  Will continue to monitor.    Pancreatic lesion - stable.  Repeat this year.    Postnasal drip.  -     ipratropium (ATROVENT) 21 mcg (0.03 %) nasal spray; 2 sprays by Each Nostril route 2 (two) times daily.    Microalbuminuria due to type 2 diabetes mellitus -   -     olmesartan (BENICAR) 5 MG Tab; Take 1 tablet (5 mg total) by mouth once daily.    Mass of left lower extremity  -     CT Leg (Tibia-Fibula) Wtih Contrast Left; Future    Advance Care Planning     Date: 05/09/2024  Patient did not wish or was not able to name a surrogate decision maker or provide an Advance Care Plan.       Follow up in about 3 months (around 8/9/2024). Phone review of labs/imaging.      Reta Weeks MD  Department of Internal Medicine - [facility]  7:22 AM

## 2024-05-15 ENCOUNTER — OFFICE VISIT (OUTPATIENT)
Dept: RHEUMATOLOGY | Facility: CLINIC | Age: 64
End: 2024-05-15
Payer: COMMERCIAL

## 2024-05-15 ENCOUNTER — LAB VISIT (OUTPATIENT)
Dept: LAB | Facility: HOSPITAL | Age: 64
End: 2024-05-15
Payer: COMMERCIAL

## 2024-05-15 VITALS
HEART RATE: 95 BPM | DIASTOLIC BLOOD PRESSURE: 84 MMHG | WEIGHT: 185.19 LBS | SYSTOLIC BLOOD PRESSURE: 146 MMHG | BODY MASS INDEX: 30.85 KG/M2 | HEIGHT: 65 IN

## 2024-05-15 DIAGNOSIS — M85.80 OSTEOPENIA, UNSPECIFIED LOCATION: Primary | ICD-10-CM

## 2024-05-15 DIAGNOSIS — M33.13 DERMATOMYOSITIS: ICD-10-CM

## 2024-05-15 LAB
CK SERPL-CCNC: 285 U/L (ref 20–180)
CRP SERPL-MCNC: 6 MG/L (ref 0–8.2)
ERYTHROCYTE [SEDIMENTATION RATE] IN BLOOD BY PHOTOMETRIC METHOD: >120 MM/HR (ref 0–36)
HBV CORE AB SERPL QL IA: REACTIVE
HBV SURFACE AB SER-ACNC: 666.08 MIU/ML
HBV SURFACE AB SER-ACNC: REACTIVE M[IU]/ML
HBV SURFACE AG SERPL QL IA: NORMAL

## 2024-05-15 PROCEDURE — 3061F NEG MICROALBUMINURIA REV: CPT | Mod: CPTII,S$GLB,, | Performed by: INTERNAL MEDICINE

## 2024-05-15 PROCEDURE — 99999 PR PBB SHADOW E&M-EST. PATIENT-LVL III: CPT | Mod: PBBFAC,,, | Performed by: INTERNAL MEDICINE

## 2024-05-15 PROCEDURE — 3066F NEPHROPATHY DOC TX: CPT | Mod: CPTII,S$GLB,, | Performed by: INTERNAL MEDICINE

## 2024-05-15 PROCEDURE — 36415 COLL VENOUS BLD VENIPUNCTURE: CPT | Performed by: INTERNAL MEDICINE

## 2024-05-15 PROCEDURE — 3077F SYST BP >= 140 MM HG: CPT | Mod: CPTII,S$GLB,, | Performed by: INTERNAL MEDICINE

## 2024-05-15 PROCEDURE — 87340 HEPATITIS B SURFACE AG IA: CPT | Performed by: INTERNAL MEDICINE

## 2024-05-15 PROCEDURE — 99214 OFFICE O/P EST MOD 30 MIN: CPT | Mod: S$GLB,,, | Performed by: INTERNAL MEDICINE

## 2024-05-15 PROCEDURE — 86704 HEP B CORE ANTIBODY TOTAL: CPT | Performed by: INTERNAL MEDICINE

## 2024-05-15 PROCEDURE — 86480 TB TEST CELL IMMUN MEASURE: CPT | Performed by: INTERNAL MEDICINE

## 2024-05-15 PROCEDURE — 3044F HG A1C LEVEL LT 7.0%: CPT | Mod: CPTII,S$GLB,, | Performed by: INTERNAL MEDICINE

## 2024-05-15 PROCEDURE — 86706 HEP B SURFACE ANTIBODY: CPT | Performed by: INTERNAL MEDICINE

## 2024-05-15 PROCEDURE — 1159F MED LIST DOCD IN RCRD: CPT | Mod: CPTII,S$GLB,, | Performed by: INTERNAL MEDICINE

## 2024-05-15 PROCEDURE — 4010F ACE/ARB THERAPY RXD/TAKEN: CPT | Mod: CPTII,S$GLB,, | Performed by: INTERNAL MEDICINE

## 2024-05-15 PROCEDURE — 82085 ASSAY OF ALDOLASE: CPT | Performed by: INTERNAL MEDICINE

## 2024-05-15 PROCEDURE — 86140 C-REACTIVE PROTEIN: CPT | Performed by: INTERNAL MEDICINE

## 2024-05-15 PROCEDURE — 3079F DIAST BP 80-89 MM HG: CPT | Mod: CPTII,S$GLB,, | Performed by: INTERNAL MEDICINE

## 2024-05-15 PROCEDURE — 1160F RVW MEDS BY RX/DR IN RCRD: CPT | Mod: CPTII,S$GLB,, | Performed by: INTERNAL MEDICINE

## 2024-05-15 PROCEDURE — 82550 ASSAY OF CK (CPK): CPT | Performed by: INTERNAL MEDICINE

## 2024-05-15 PROCEDURE — 85652 RBC SED RATE AUTOMATED: CPT | Performed by: INTERNAL MEDICINE

## 2024-05-15 PROCEDURE — 3008F BODY MASS INDEX DOCD: CPT | Mod: CPTII,S$GLB,, | Performed by: INTERNAL MEDICINE

## 2024-05-15 NOTE — PROGRESS NOTES
RHEUMATOLOGY CLINIC FOLLOW UP VISIT  Chief complaints:-  To follow up for Myositis follow up     The chief complaint leading to consultation is: DM      Notes:       HPI:-  Ermelinda Javed a 63 y.o. pleasant female  with history of L sided infiltrating ductal carcinoma of the L breast (dx on Jan 2017), HTN, depression, gastritis, and recently diagnosed myositis (uncertain if it's autoimmune vs medication induced), present today with concern about myositis flare     Patient was initially send from hem/onc clinic for evaluation of possible inflammatory myositis.  Patient was hospitalized from 1/22/19 till 2/13/19 for myositis.      L breast cancer s/p completion of 4 cycles of demi-adjuvant taxotere and cytoxan (completed on 5/9/17) and mastectomy (6/26/17) and then 4 cycles of adjuvant Adriamycin (completed 9/12/17). In 10/2017 - patient developed L supraclavicular lymphadenopathy which FNA confirmed as reoccurrence of previously treated breast cancer.      Patient started on Atelizumab/Abraxane on 11/7/18. Per chart review, patient noticed rashes on the dorsum of her hands on 11/11/18. Seen in urgent care and given topical steroid cream. Then received two more infusions on Atelizumab/Abraxane on 11/21/18 and 12/5/18. Started to notice puffiness around the eyes on 12/11/18. Received another Abraxane infusion on 12/12/18 but this time with hydrocortisone 50 mg which helped reduce the swelling around the eyes. Developed swelling of the face again on 12/15/18. Next infusion of Abraxane done on 12/19/18 with Solucortef and Atelizumab held. Abraxane given again on 1/3/19 with no IV steroid. Patient developed swelling of the face on 1/4/19 and went to urgent care and given short course of prednisone 20 mg BID. On 1/15/19, patient seen in hem/onc clinic with c/o of pressure and tightness around neck. CT scan of chest and neck did not reveal any vascular compression.  Started on prednisone 60 mg with taper. On 1/22/18 patient with c/o proximal muscle weakness. CPK found to be elevated around 4k. Patient admitted and given solumedrol 80 mg IV on 1/22/18. Last infusion of Atelizumab on 12/19/18. Last infusion of Abraxane on 1/3/19. Given Solumedrol 1g x 1 on 1/23/18. Seen by Dermatology on 1/24/18 and biopsy done of skin rash. Given distribution of rashes, there was concern for dermatomyositis. Patient started on Solumedrol 125 mg IV BID at this time.      During her hospitalization patient was started on high dose steroid Solumedrol 80mg IV x 1, 1g x 1, and then 125mg BID until tapered down to medrol 48mg.  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  Patient was started on IVIG (400mg/kg/daily x 5 day) 1/29/19-2/2.   Patient started on MTX 20mg SQ (Feb 5 2019) with folic acid. MTX SQ was transitioned to PO because concern about rehab administration.  Patient failed swallow eval and PEG tube was placed.        Denies any family history of autoimmune diseases.     No smoking, EtOH, recreational drug usage.     Denies any photosensitivity, joint swelling, unintentional weigh loss, abdominal pain, night sweats, CP, SOB.  +oral thrush.      Completed rehab at [facility].  Completed IVIG (2/28 and 3/1).  Had mild HA after infusion.  Doing well.  A lot stronger - able to do household chores since discharge.  Not back at baseline yet ~80%.  Mild weakness with increased activities.  Able to ambulate without assistance (but is still a fall precaution).  Tolerating diet via PEG tube.  Completed rehab.      MTX discontinued 2/18 with concern of toxicity (decrease blood counts and transaminatis).  Folic acid increased to 4mg daily.  Still on medrol 32mg daily with atorvaquone for PCP prophylaxis.  Bactrim d/c because of interaction with leucovorin.  Leucovorin 5mg daily started - Leucovorin discontinued.  Continues to be on folic acid 4mg daily     Radiation completed (28 days)  completed May 8.       EGD/colonoscopy done on July 29 - normal.  PEG tube removed     Last PET scan (June 20) showed negative for metastasis.  At this time, will monitor per oncology and repeat PET scan in Sept.  No treatment needed at this time.      Rash was biopsied and evaluated by dermatology.   Biopsy report conclusive of dermatomyositis.  Was given topical steroid which provided minimal alleviation of symptoms.  +rajniuitius      6/2020    Completed Rituxan on 4/27 (1g x 2).  Had a flare after infusion requiring increase of steroid.  Since then patient had been doing well - improving of myalgia and rash.  Still having mild edema and generalized weakness (especially in the afternoon/evening).       Patient was started on HCQ - compliant on all home medications.  Continues to work out daily.  Finds most difficulty with getting in and out of the car.   Denies any dysphagia, alopecia, arthralgia, abdominal pain, CP, SOB, N/V, chills, or urinary symptoms.      7/2020    Rash all over her body  Red rash on the left leg  Face once again has erythema and heliotrope rash  Prednisone down to 10 mg and looks like she has a flare again  On plaquenil 200 mg bid  Wears sunscreen  Avoids the direct sun    Most recent labs     Ck nml  Aldolase nml  ESR 53  CRP nml  Mild anemia  AST 67      9/2020    She started xeljanz 5 mg bid mid august  She feels well  Swelling is better  Her weakness and fatigue is all gone  Face still looks red   Chest is all better and upper arm is great  Itch and rash seems better   Rash on the legs is gone     CK,aldolase nml  CRP nml   now   AST 61  GFR nml  ALT nml    plts 145 but stable  H/h 11.6/35.3    But the white count has dropped to 2.70    It has been low on and off since 2019 so this is not new     She has a sinus issue going on   She has greenish d/c  She has a tinge of blood       10/12/2020  We cut the xeljanz dose to 5 mg daily  No more swelling  Face looks clear,not much  redness  The eyelid is not puffy and not intensely erythematous   Along the nasal folds there is some redness but again not intense  On the MCPs and Dorsal hands she has some erythema which is more pinkish  Not itchy  Sometimes these pink areas can turn pigmented  Now complaint to sunscreen      White count has improved from 2.72 to 2.80  H/h 11.3/34.7  plts nml  ANC has improved from 0.6 to 1.4  Lymphocyte count 0.9    2/2021    She has fatigue if she has to do a lot of things  Facial rash is stable  On the hands : redness is stable  Sometimes left ankle and left knee is swollen,painful/tight   Not muscular weakness but she feels skinny  BMI is 29.39 though    White count 4.30  H/H 11.6/35.4  plts 223  Lymphocytes nml    AST went upto 52  Rest of LFTs ok  GFR nml  ESR > 120  CRP nml  CK,aldolase nml    2/2021 we made her xeljanz 7.5 mg daily  Gets IVIG     4/2021  Dermatology said    Patient appears to have 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 No need for addition of systemic steroids with controlled myopathy and otherwise absence of systemic disease due to lack of efficacy in controlling cutaneous disease, side effect profile.   Currently with evidence of both nonvasculopathic cutaneous disease (heliotrophe, facial rash, Gottron's papules) as well as a degree vasculopathic disease (nailfold vascular changes, evidence digital pulp ulcers/lesions, associated pain at these locations). Overall patient doing well, states QOL not greatly affected by residual rashes.      Considerations for comorbid rosacea/seb derm at face (micropustules on exam today; some scaling at brows, nasal creases noted).      Recommendations:  - Strict sun protection measures always.   - Optimization of topicals as above: Elidel mixed 50/50 with ketoconazole cream BID PRN rash at face; Plexion wash daily at face; transition to Diprolene cream for rash at hands.   -  Offered ILK to rash at hands, patient again declined.   - Consideration for addition of vasodilatory agent (e.g. nifedipine) for relief in associated pain at fingertip lesions.   - Otherwise c/w rheumatologic management with IVIG, tofacitinib.      Labs 4/2021    H/h 11.3/36.2  nml white count 5.27  plts nml    5/2021    Patient would like to consider steroid/intralesional kenalog injections  She had stopped xeljanz on 4/28 for the covid vaccine   White count was done 2 days after 4/28 which is on 4/30 and the count was 5.27  She got covid vaccine on 3/29   Stopped xeljanz till 4/6  Did the blood work on 4/8 and was on xeljanz at that time  So her white count was 3.80  Prior to that she was 4.30 and 5.19    CMP nml    10/2021    She is doing really well  She has not needed steroid creams or injections  Skin looks really good  She is not puffy,she is not weak    Left foot has a stabbing pain after she walks around for some time  Some aches and pains    8/2021    CRP 15.8  ESR > 130  She had sinus issues at this time  Ck,aldolase nml  AST still 51  ALT nml  GFR nml  H/h 11.6/34.7  nml white count,4.90  nml plts  GGT nml  Vit d nml    1/2022    She is doing really really well  No more red and pink rashes  No more edema  On xeljanz 5 mg daily/was on 7.5 mg daily   Insurance is denying-so appeal     Labs     Ck,aldolase nml  CRP nml  ESR 67  CMP nml  CBC nml      5/2022    She took xeljanz 5 mg daily for 2 to 3 months and then didn't have medication for a month- thankfully has not flared in the skin and muscles  Today's labs are pending   IVIG - is still monthly     9/2022      On xeljanz 5 mg daily  She feels great  IVIG is monthly    Right hand -looks a little flared - this is her driving hand     Aldolase nml  -Stable  CRP nml  ESR Down to 67,it was > 120  CMP- AST upto 50  ALT nml  CBC - H/H 11.5/34.1,Nml plts,white count       12/2022    H/h 11.5/35  Nml white and plt count  Ck 238  Aldolase  nml      3/2023    Calcium 11.1  She was taking 2 calciums a day  Protein is also high 11  Creat nml  AST down to 28  ALT nml    3/2023    IVIG is 1 g/kg monthly since may 2022  Xeljanz 5 mg daily - since sep 2022    Skin-doing great    For 6 months she has done half the dose and she has still done well    6/2023  She has muscle weakness with activity   This started after we dropped the dose of IVIG to every 8 weeks  Sciatica left side  Exam 5/5 strength  Bending over-some pain  Lifting baby- she has pain     Rash resolved     On xeljanz 5 mg daily        She has been taking crestor for a long time  So this might be a flare due to tapering off IVIG and not the statins    10/2023    Taking xeljanz - 5 mg daily  IVIG 2 G/KG BW EVERY 4 WEEKS  She wants to do every 6 weeks    200 mg dietary calcium and Vitamin D3 1000 mg daily    Been feeling good overall. Muscle weakness is not getting worse and has been feeling better since doing IVIG monthly  No rashes recently   -Good strength 5/5    Joints are achy- worse with the cold weather- knees and ankles  -Good ROM and no TTP  -Tylenol helps with pain    No longer having sciatica    Muscle strengthening exercises at home- been doing them. Feels like it helps her strength     Had flu vaccination done    Labs 9/2023  ESR- >130  CRP- normal  CK- 239  Aldolase- normal  CBC- low H/H- 11.7/36.3  CMP- Potassium 3.3, calcium 10.4, total protein 11.3,   AST-48  ALT- normal  GFR- >60    2/2024    Myositis flared  Rash back on the face and the hands   Not in the sun  Last dose on IVIG 1/2/2024  PLAN WAS to taper it to every 6 weeks starting October 2023  Rash started in January and has become persistent  Xeljanz is also 5 mg daily    Ck,aldolase nml  ESR 45  CRP nml  H/h 11/33.5  Nml white count and plt count  GFR 52      5/2024    We couldn't get xeljanz after February  Insurance made us change the medication to rinvoq for 2 months  But for 2 weeks she has resumed the xeljanz 5 mg  bid    In February we asked   IVIG 1 g/kg bw - every 4 weeks  But insurance just approved this dose  She kept getting 1 g/kg bw every 6 weeks since October 2023     From the next infusion she will do IVIG 1 g/kg bw every 4 weeks    Mild anemia 11.8/36.2  White count nml  Plt count nml    AST 45  ALT nml  GFR nml    PTH 76  Calcium 9.9  Vit d nml  PCP is following this    Derm agreed that she has a flare of her dermatomyositis  Flare has subsided  She attributes the flare to dose reduction of xeljanz to 5 mg daily and daily exposure to sun while driving without a sunscreen  But all that has changed        Review of Systems   Constitutional:  Negative for chills, diaphoresis, fever, malaise/fatigue and weight loss.   HENT:  Negative for congestion, ear discharge, ear pain, hearing loss, nosebleeds, sinus pain and tinnitus.    Eyes:  Positive for discharge. Negative for photophobia, pain and redness.   Respiratory:  Negative for cough, hemoptysis, sputum production, shortness of breath, wheezing and stridor.    Cardiovascular:  Negative for chest pain, palpitations, orthopnea, claudication, leg swelling and PND.   Gastrointestinal:  Positive for constipation. Negative for abdominal pain, diarrhea, heartburn, nausea and vomiting.   Genitourinary:  Negative for dysuria, frequency, hematuria and urgency.   Musculoskeletal:  Positive for myalgias. Negative for back pain, joint pain and neck pain.   Skin:  Positive for rash.   Neurological:  Negative for dizziness, tingling, tremors, weakness and headaches.   Endo/Heme/Allergies:  Does not bruise/bleed easily.   Psychiatric/Behavioral:  Negative for depression, hallucinations and suicidal ideas. The patient is not nervous/anxious and does not have insomnia.        Past Medical History:   Diagnosis Date    Anemia 2019    Anxiety 2018    Breast cancer 01/01/2017    left    Controlled type 2 diabetes mellitus without complication, without long-term current use of insulin  2023    Depression     Dermatomyositis     Diverticulosis     Erosive esophagitis     Gastritis     Hepatic cyst     Hypertension     Immune disorder 2019    Joint pain 2019    Keloid cicatrix 2005    Thyroiditis     Vitamin B12 deficiency 3/8/2018       Past Surgical History:   Procedure Laterality Date    BREAST BIOPSY Left     BREAST RECONSTRUCTION Left 2017     SECTION      COLONOSCOPY  2011    repeat in 10 yrs.    COLONOSCOPY N/A 2019    Procedure: COLONOSCOPY;  Surgeon: Pernell Rosas MD;  Location: [facility] (4TH FLR);  Service: Endoscopy;  Laterality: N/A;  Patient would like to use her PEG tube for bowel prep and then have her PEG tube removed after EGD and colonoscopy procedures while she is still sedated.    D&C      ESOPHAGOGASTRODUODENOSCOPY N/A 2019    Procedure: EGD (ESOPHAGOGASTRODUODENOSCOPY);  Surgeon: Pernell Rosas MD;  Location: [facility] (2ND FLR);  Service: Endoscopy;  Laterality: N/A;    ESOPHAGOGASTRODUODENOSCOPY N/A 2019    Procedure: EGD (ESOPHAGOGASTRODUODENOSCOPY);  Surgeon: Pernell Rosas MD;  [facility]  Service: Endoscopy;  Laterality: N/A;  EGD for follow-up of erosive esophagitis per Dr. Rosas    Patient would like to use her PEG tube for bowel prep and then have her PEG tube removed after EGD and colonoscopy procedures while she is still sedated.    INSERTION OF TUNNELED CENTRAL VENOUS CATHETER (CVC) WITH SUBCUTANEOUS PORT Right 10/31/2018    Procedure: [id number];  Surgeon: Deo Palencia MD;  Location: [facility] OR [address] [place] [address];  Service: General;  Laterality: Right;    MASTECTOMY Left 2017    MYOMECTOMY      PORTACATH PLACEMENT      SENTINEL LYMPH NODE BIOPSY  2017    left    SKIN BIOPSY  2019    TOTAL REDUCTION MAMMOPLASTY Right 2017    UPPER GASTROINTESTINAL ENDOSCOPY          Social History     Tobacco Use    Smoking status: Never     Passive exposure: Never    Smokeless tobacco: Never   Substance Use Topics    Alcohol  "use: Not Currently    Drug use: No       Family History   Problem Relation Name Age of Onset    Hypertension Mother      Heart disease Father      Hypertension Father      Breast cancer Sister  52    No Known Problems Sister      No Known Problems Brother      No Known Problems Brother      No Known Problems Brother      Thyroid disease Daughter          hyperthyroidism s/p thyroidectomy    No Known Problems Daughter      No Known Problems Son      Colon cancer Neg Hx      Ovarian cancer Neg Hx      Celiac disease Neg Hx      Cirrhosis Neg Hx      Colon polyps Neg Hx      Esophageal cancer Neg Hx      Inflammatory bowel disease Neg Hx      Liver cancer Neg Hx      Liver disease Neg Hx      Rectal cancer Neg Hx      Stomach cancer Neg Hx      Ulcerative colitis Neg Hx      Melanoma Neg Hx         Review of patient's allergies indicates:  No Known Allergies    Vitals:    05/15/24 0828   BP: (!) 146/84   Pulse: 95   Weight: 84 kg (185 lb 3 oz)   Height: 5' 5" (1.651 m)   PainSc:   2         Physical Exam  HENT:      Head: Normocephalic and atraumatic.   Eyes:      Pupils: Pupils are equal, round, and reactive to light.   Cardiovascular:      Rate and Rhythm: Normal rate and regular rhythm.      Heart sounds: Normal heart sounds.   Pulmonary:      Effort: Pulmonary effort is normal.      Breath sounds: Normal breath sounds.   Abdominal:      General: Bowel sounds are normal.      Palpations: Abdomen is soft.   Musculoskeletal:         General: Normal range of motion.      Cervical back: Normal range of motion and neck supple.   Skin:     General: Skin is warm and dry.      Findings: No erythema or rash.      Comments: Right hand - rash is back again,left hand not so prominent     Neurological:      Mental Status: She is alert and oriented to person, place, and time.      Gait: Gait is intact.   Psychiatric:         Mood and Affect: Mood and affect normal.         Cognition and Memory: Memory normal.         Judgment: " Judgment normal.       Digital pitting +    Labs:  Results for ROMEO PEREZ (MRN [id number]) as of 6/17/2020 10:12   Ref. Range 4/22/2020 11:51 4/24/2020 11:43 5/25/2020 09:34 6/10/2020 11:25 6/10/2020 11:26   WBC Latest Ref Range: 3.90 - 12.70 K/uL 4.30  3.60 (L) 4.60    RBC Latest Ref Range: 4.00 - 5.40 M/uL 4.49  4.52 4.47    Hemoglobin Latest Ref Range: 12.0 - 16.0 g/dL 12.1  12.5 12.4    Hematocrit Latest Ref Range: 37.0 - 48.5 % 38.2  38.7 38.4    MCV Latest Ref Range: 82 - 98 fL 85  86 86    MCH Latest Ref Range: 27.0 - 31.0 pg 27.0  27.7 27.8    MCHC Latest Ref Range: 32.0 - 36.0 g/dL 31.8 (L)  32.4 32.4    RDW Latest Ref Range: 11.5 - 14.5 % 16.0 (H)  17.2 (H) 17.3 (H)    Platelets Latest Ref Range: 150 - 350 K/uL 157  206 164    MPV Latest Ref Range: 7.4 - 10.4 fL 7.3 (L)  7.0 (L) 7.2 (L)    Gran% Latest Ref Range: 38.0 - 73.0 % 66.0  62.8 66.2    Gran # (ANC) Latest Ref Range: 1.8 - 7.7 K/uL 2.8  2.2 3.0    Lymph% Latest Ref Range: 18.0 - 48.0 % 15.3 (L)  15.5 (L) 14.6 (L)    Lymph # Latest Ref Range: 1.0 - 4.8 K/uL 0.7 (L)  0.6 (L) 0.7 (L)    Mono% Latest Ref Range: 4.0 - 15.0 % 11.2  17.7 (H) 14.2    Mono # Latest Ref Range: 0.3 - 1.0 K/uL 0.5  0.6 0.6    Eosinophil% Latest Ref Range: 0.0 - 8.0 % 6.5  3.1 3.9    Eos # Latest Ref Range: 0.0 - 0.5 K/uL 0.3  0.1 0.2    Basophil% Latest Ref Range: 0.0 - 1.9 % 1.0  0.9 1.1    Baso # Latest Ref Range: 0.00 - 0.20 K/uL 0.00  0.00 0.00    nRBC Latest Ref Range: 0 /100 WBC 0  0 0    Differential Method Unknown Automated  Automated Automated    Sed Rate Latest Ref Range: 0 - 30 mm/Hr 56 (H)  91 (H)  73 (H)   Sodium Latest Ref Range: 136 - 145 mmol/L 135 (L)  139 137    Potassium Latest Ref Range: 3.5 - 5.1 mmol/L 3.8  3.8 3.8    Chloride Latest Ref Range: 95 - 110 mmol/L 98  102 102    CO2 Latest Ref Range: 23 - 29 mmol/L 31 (H)  29 29    BUN, Bld Latest Ref Range: 7 - 17 mg/dL 17  12 13    Creatinine Latest Ref Range: 0.70 - 1.20 mg/dL 0.70  0.60 (L) 0.70     eGFR if non African American Latest Ref Range: >60 mL/min/1.73 m^2 >60  >60 >60    eGFR if  Latest Ref Range: >60 mL/min/1.73 m^2 >60  >60 >60    Glucose Latest Ref Range: 74 - 106 mg/dL 114 (H)  89 93    Calcium Latest Ref Range: 8.4 - 10.2 mg/dL 9.8  9.8 9.7    Alkaline Phosphatase Latest Ref Range: 38 - 145 U/L 65  63 58    PROTEIN TOTAL Latest Ref Range: 6.3 - 8.2 g/dL 9.3 (H)  8.9 (H) 8.4 (H)    Albumin Latest Ref Range: 3.5 - 5.2 g/dL 4.1  4.2 4.1    BILIRUBIN TOTAL Latest Ref Range: 0.2 - 1.3 mg/dL 0.4  0.5 0.4    AST Latest Ref Range: 14 - 36 U/L 85 (H)  64 (H) 62 (H)    ALT Latest Ref Range: 10 - 44 U/L 28  26 22    CRP Latest Ref Range: 0.0 - 10.0 mg/L <5.0  <5.0 <5.0    CPK Latest Ref Range: 30 - 135 U/L 115  79 101    Hemoglobin A1C External Latest Ref Range: 4.0 - 6.0 %     6.0   SARS-CoV2 (COVID-19) Qualitative PCR Latest Ref Range: Not Detected   Not Detected      Aldolase Latest Ref Range: < OR = 8.1 U/L 5.9  5.5  4.7     Medication List with Changes/Refills   Current Medications    BETAMETHASONE DIPROPIONATE (DIPROLENE) 0.05 % OINTMENT    Apply topically 2 (two) times daily. Use to affected areas for up to 2 weeks then take a 1 week break or decrease to 3 times weekly. Do not apply to groin or face. Use to hands    IPRATROPIUM (ATROVENT) 21 MCG (0.03 %) NASAL SPRAY    2 sprays by Each Nostril route 2 (two) times daily.    LEVOCETIRIZINE (XYZAL) 5 MG TABLET    TAKE 1 TABLET BY MOUTH EVERY DAY IN THE EVENING    MAGNESIUM 30 MG TAB    Take 1 tablet by mouth Daily.    MULTIVITAMIN CAPSULE    Take 1 capsule by mouth once daily.    NIFEDIPINE (PROCARDIA-XL) 30 MG (OSM) 24 HR TABLET    Take 1 tablet (30 mg total) by mouth once daily.    OLMESARTAN (BENICAR) 5 MG TAB    Take 1 tablet (5 mg total) by mouth once daily.    ROSUVASTATIN (CRESTOR) 10 MG TABLET    Take 1 tablet (10 mg total) by mouth once daily.    TOFACITINIB (XELJANZ) 5 MG TAB    Take 5 mg by mouth 2 (two) times daily.     VITAMIN D (VITAMIN D3) 1000 UNITS TAB    Take 1,000 Units by mouth once daily.       Assessment/Plans:-    63 y.o.F with history of L sided infiltrating ductal carcinoma of the L breast (dx on Jan 2017), HTN, depression, gastritis, and recently diagnosed myositis (uncertain if it's autoimmune vs medication induced), present today for follow up because of concern about myositis flare.     Patient started on Atelizumab/Abraxane on 11/7/18.  Patient developed swelling of the face on 1/4/19 and went to urgent care and given short course of prednisone 20 mg BID. On 1/15/19, patient seen in hem/onc clinic with c/o of pressure and tightness around neck. CT scan of chest and neck did not reveal any vascular compression. Started on prednisone 60 mg with taper. On 1/22/18 patient with c/o proximal muscle weakness. CPK found to be elevated around 4k. Patient admitted and given solumedrol 80 mg IV on 1/22/18. Last infusion of Atelizumab on 12/19/18. Last infusion of Abraxane on 1/3/19. Given Solumedrol 1g x 1 on 1/23/18. Seen by Dermatology on 1/24/18 and biopsy done of skin rash. Given distribution of rashes, there was concern for dermatomyositis. Patient started on Solumedrol 125 mg IV BID at that time.  Skin biopsy resulted consistent with dermatomyositis.     Labs: CPK and Aldolase normalized. +DARCY 1:640 speckled with negative profile.  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 quantTB: indeterminate, T-spot: negative     Spirometry: normal, normal diffusion capacity. FVC: 81%, DLCO: 114%     Patient exhibits signs of dermatomyositis (heliotropic rash and gottron's papules).   Dermatomyositis can be associated with malignancy.  MRI of LUE showed edema of the deltoid and biceps.  Exam with proximal muscle weakness: b/l deltoids 4/5,  b/l iliopsoas 4.4/5. 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     Previously with evidence of both nonvasculopathic cutaneous disease (heliotrophe, facial rash, Gottron's papules) as well as a degree vasculopathic disease (nailfold vascular changes, evidence digital pulp ulcers/lesions, associated pain at these locations).      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   Given +TIF1 gamma positivity, most likely dermatomyositis is from underlying malignancy.      Failed 2nd swallow eval on 2/6/19. PEG placed 2/7/2019.     Current medications:  - prednisone 20mg   - IVIG Started Jan 2019 - last dose April 7  - Rituxan 1000mg x 2 (last dose April 27)  - HCQ 200mg BID      Esophageal biopsy - negative for viral infection.  Nonspecific chronic inflammation.     EGD/Colonoscopy (July 2019) - normal. PEG tube removed      Fiboscan done Aug 2019 - showed fatty liver with no scarring/damage.,      Failed Cellcept - worsening leukopenia, elevated LFTs, and other side effects without improvement of symptoms      Recent studies demonstrated increased efficacy in IVIG when spaced out (Q2w instead of Q4w)     Patient is an intermediate metabolizer based on TPMT.  Cannot start imuran.  Labs still neutropenic and thrombocytopenic at this time - uncertain of the cause (radiation induce BM fibrosis vs medication induce?)      Vaccination completed - Prevnar and Shingles (completed Aug 2019) and flu vaccination for this season (Aug 2019)      Dermatomyositis - currently on Rituxan and Prednisone 20mg daily.  Tolerating well and improvement of symptoms.  Plan is taper steroid and continue Rituxan 1000mg x 2 every 6 months.     It appears that patient continued to fail steroid tapering on multiple occasion.  Patient is uptodate on all CA screening -  next PET scan is July 2020.  Other consideration for continued myalgia includes neurological condition such as MG.  Will other MG panel and referral to neurology.     If patient continues to failed steroid tapering - consideration for tacrolimus + IVIG, Xeljanz, plasmapharesis/plasma exchange.       My addendum last time    60 year old female with dermatomyositis s/p PD1 inhibitor used for breast cancer  TIF1 gamma +    Low dose steroids didn't help    Rash+ muscle weakness+dysphagia     IVIG and steroids initial treatment    mtx caused LFT derangement,anemia and leukopenia   She didn't respond to IVIG, initially we gave IVIG 2 g/kg every 4 weeks and then 1 g/kg bw ever 2 weeks : poor response   She was on cellcept 500 mg daily and she had cytopenias   Imuran not an option since she is intermediate metaboliser     She had significant periorbital edema,rash on the neck and thighs    We didn't think she was responding to the treatment     So we gave her rituximab 1000 mg x 2    She did well and then she flared again may 2020    We had to go up on the dose to 30 mg and then tapered it to 20 mg  We started plaquenil    CT chest abdomen and pelvis nml  Colonoscopy nml  PET lymphadenopathy  Echo nml    Myasthenia gravis panel negative     Last time she needed prednisone 10 mg     She was weak and her skin had flared    Refractory dermatomyositis    We resumed IVIG and xeljanz 5 mg bid     She had done really well        9/2020    She started xeljanz 5 mg bid mid august  She feels well  Swelling is better  Her weakness and fatigue is all gone  Face still looks red   Chest is all better and upper arm is great  Itch and rash seems better   Rash on the legs is gone     CK,aldolase nml  CRP nml   now   AST 61  GFR nml  ALT nml    plts 145 but stable  H/h 11.6/35.3    But the white count has dropped to 2.70    It has been low on and off since 2019 so this is not new     Her lymphocyte count is 900  cells/mm3    Lymphopenia    In the controlled clinical trials, confirmed decreases in absolute lymphocyte counts below 500 cells/mm3 occurred in 0.04% of patients for the 5 mg twice daily and 10 mg twice daily XELJANZ groups combined during the first 3 months of exposure.    Confirmed lymphocyte counts less than 500 cells/mm3 were associated with an increased incidence of treated and serious infections      Her neutrophil count is 1300 cells/mm3    Neutropenia    In the controlled clinical trials, confirmed decreases in ANC below 1000 cells/mm3 occurred in 0.07% of patients for the 5 mg twice daily and 10 mg twice daily XELJANZ groups combined during the first 3 months of exposure.    There were no confirmed decreases in ANC below 500 cells/mm3 observed in any treatment group.    There was no clear relationship between neutropenia and the occurrence of serious infections.    In the long-term safety population, the pattern and incidence of confirmed decreases in ANC remained consistent with what was seen in the controlled clinical trials        Lymphocyte Abnormalities    Treatment with XELJANZ was associated with initial lymphocytosis at one month of exposure followed by a gradual decrease in mean absolute lymphocyte counts below the baseline of approximately 10% during 12 months of therapy. Lymphocyte counts less than 500 cells/mm3 were associated with an increased incidence of treated and serious infections.    Avoid initiation of XELJANZ/XELJANZ XR treatment in patients with a low lymphocyte count (i.e., less than 500 cells/mm3). In patients who develop a confirmed absolute lymphocyte count less than 500 cells/mm3, treatment with XELJANZ/XELJANZ XR is not recommended.    Monitor lymphocyte counts at baseline and every 3 months thereafter.       Neutropenia    Treatment with XELJANZ was associated with an increased incidence of neutropenia (less than 2000 cells/mm3) compared to placebo.    Avoid initiation of  XELJANZ/XELJANZ XR treatment in patients with a low neutrophil count (i.e., ANC less than 1000 cells/mm3). For patients who develop a persistent ANC of 500 to 1000 cells/mm3, interrupt XELJANZ/XELJANZ XR dosing until ANC is greater than or equal to 1000 cells/mm3. In patients who develop an ANC less than 500 cells/mm3, treatment with XELJANZ/XELJANZ XR is not recommended.    Monitor neutrophil counts at baseline and after 4-8 weeks of treatment and every 3 months thereafter.     Anemia    Avoid initiation of XELJANZ/XELJANZ XR treatment in patients with a low hemoglobin level (i.e., less than 9 g/dL). Treatment with XELJANZ/XELJANZ XR should be interrupted in patients who develop hemoglobin levels less than 8 g/dL or whose hemoglobin level drops greater than 2 g/dL on treatment.    So at this point we decided to continue the xeljanz 5 mg bid and IVIG     But we sent her to hematology and the counts further dropped     10/12/2020  We cut the xeljanz dose to 5 mg daily  No more swelling  Face looks clear,not much redness  The eyelid is not puffy and not intensely erythematous   Along the nasal folds there is some redness but again not intense  On the MCPs and Dorsal hands she has some erythema which is more pinkish  Not itchy  Sometimes these pink areas can turn pigmented  Now complaint to sunscreen      White count has improved from 2.72 to 2.80  H/h 11.3/34.7  plts nml  ANC has improved from 0.6 to 1.4  Lymphocyte count 0.9    Ck,aldolase nml  CRP nml  ESR 65      2/2021  Now completely off prednisone    Some hot flashes at nights  Not documented temperatures/some night sweats  Recent cancer screening negative  Ct chest,abdomen and pelvis and chest    She has fatigue if she has to do a lot of things  Facial rash is stable  On the hands : redness is stable  Sometimes left ankle and left knee is swollen,painful/tight   Not muscular weakness but she feels skinny  BMI is 29.39 though    White count 4.30  H/H  11.6/35.4  plts 223  Lymphocytes nml    AST went upto 52  Rest of LFTs ok  GFR nml  ESR > 120  CRP nml  CK,aldolase nml        2/2021 we made her xeljanz 7.5 mg daily  Gets IVIG     4/2021  Dermatology said    Patient appears to have 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 No need for addition of systemic steroids with controlled myopathy and otherwise absence of systemic disease due to lack of efficacy in controlling cutaneous disease, side effect profile.   Currently with evidence of both nonvasculopathic cutaneous disease (heliotrophe, facial rash, Gottron's papules) as well as a degree vasculopathic disease (nailfold vascular changes, evidence digital pulp ulcers/lesions, associated pain at these locations). Overall patient doing well, states QOL not greatly affected by residual rashes.      Considerations for comorbid rosacea/seb derm at face (micropustules on exam today; some scaling at brows, nasal creases noted).      Recommendations:  - Strict sun protection measures always.   - Optimization of topicals as above: Elidel mixed 50/50 with ketoconazole cream BID PRN rash at face; Plexion wash daily at face; transition to Diprolene cream for rash at hands.   - Offered ILK to rash at hands, patient again declined.   - Consideration for addition of vasodilatory agent (e.g. nifedipine) for relief in associated pain at fingertip lesions.   - Otherwise c/w rheumatologic management with IVIG, tofacitinib.      Labs 4/2021    H/h 11.3/36.2  nml white count 5.27  plts nml    5/2021    Patient would like to consider steroid/intralesional kenalog injections  She had stopped xeljanz on 4/28 for the covid vaccine   White count was done 2 days after 4/28 which is on 4/30 and the count was 5.27  She got covid vaccine on 3/29   Stopped xeljanz till 4/6  Did the blood work on 4/8 and was on xeljanz at that time  So her white count was 3.80  Prior to  that she was 4.30 and 5.19    CMP nml      5/2021  She has shown remarkable improvement   She used to be very erythematous on the face and the hands,but that has significantly resolved  She had rashes on the chest,upper back and legs and that has resolved  She had calcinosis and that has resolved  She has no muscle weakness  All she has is mild erythema on the forehead,nose,around the nasolabial fold,heliotrope rash on the eyelids,gottrons papules on the MCPs and PIPs   She has digital pitting with no active ulcers  She has nail fold capillary changes     10/2021    She is doing really well  She has not needed steroid creams or injections  Skin looks really good  She is not puffy,she is not weak    Left foot has a stabbing pain after she walks around for some time  Some aches and pains-exam nml  May be neuropathy    8/2021    CRP 15.8  ESR > 130  She had sinus issues at this time  Ck,aldolase nml  AST still 51  ALT nml  GFR nml  H/h 11.6/34.7  nml white count,4.90  nml plts  GGT nml  Vit d nml    1/2022    She is doing really really well  No more red and pink rashes  No more edema  On xeljanz 5 mg daily/was on 7.5 mg daily   Insurance is denying-so appeal   Patient really needs to continue the current regimen since she has done really well,she has tried and failed many other drugs and stopping xeljanz will only cause flare up of the disease    Labs     Ck,aldolase nml  CRP nml  ESR 67  CMP nml  CBC nml      5/2022    She took xeljanz 5 mg daily for 2 to 3 months and then didn't have medication for a month- thankfully has not flared in the skin and muscles  Today's labs are pending - protein high,LFTs nml,CK slowly trending up,CRP mild elevation  IVIG - is still monthly   Exam-no rash and muscle strength normal      9/2022      On xeljanz 5 mg daily  She feels great  IVIG is monthly-but we have cut the dose to 1 g/kg monthly not 2 g/kg monthly    Right hand -looks a little flared - this is her driving hand      Aldolase nml  -Gdivbd-yjnovh trending up  CRP nml  ESR Down to 67,it was > 120  CMP- AST upto 50  ALT nml  CBC - H/H 11.5/34.1,Nml plts,white count       12/2022    H/h 11.5/35  Nml white and plt count  Ck 238  Aldolase nml      3/2023    Calcium 11.1  She was taking 2 calciums a day  Protein is also high 11  Creat nml  AST down to 28  ALT nml    3/2023    IVIG is 1 g/kg monthly since may 2022  Xeljanz 5 mg daily - since sep 2022  In September -we had decided to increase xeljanz to 7.5 mg but she didn't do it  She had a mild flare at that time    Skin-doing great    She didn't increase the dose but 5 mg is keeping her stable    For 6 months she has done half the dose and she has still done well    CRP 12  ESR 77  Aldolase 9.1  Ck 242  H/h 11.2/36.6    6/2023    She has muscle weakness with activity   This started after we dropped the dose of IVIG to every 8 weeks  Sciatica left side  Exam 5/5 strength  Bending over-some pain  Lifting baby- she has pain    Rash resolved    On xeljanz 5 mg daily      She has been taking crestor for a long time  So this might be a flare due to tapering off IVIG and not the statins        10/2023    Taking xeljanz - 5 mg daily  IVIG 1 G/KG BW EVERY 4 WEEKS  She wants to do every 6 weeks    200 mg dietary calcium and Vitamin D3 1000 mg daily    Been feeling good overall. Muscle weakness is not getting worse and has been feeling better since doing IVIG monthly  No rashes recently   -Good strength 5/5    Joints are achy- worse with the cold weather- knees and ankles  -Good ROM and no TTP  -Tylenol helps with pain    No longer having sciatica    Muscle strengthening exercises at home- been doing them. Feels like it helps her strength     Had flu vaccination done    Labs 9/2023  ESR- >130  CRP- normal  CK- 239  Aldolase- normal  CBC- low H/H- 11.7/36.3  CMP- Potassium 3.3, calcium 10.4, total protein 11.3,   AST-48  ALT- normal  GFR- >60    2/2024    Myositis flared  Rash back on  the face and the hands   Not in the sun  Last dose on IVIG 1/2/2024  PLAN WAS to taper it to every 6 weeks starting October 2023  Rash started in January and has become persistent  Xeljanz is also 5 mg daily    SPEP/ERVIN polyclonal gammaglobulinemia   Kappa lambda ratio elevated due to the same         5/2024    We couldn't get xeljanz after February  Insurance made us change the medication to rinvoq for 2 months  But for 2 weeks she has resumed the xeljanz 5 mg bid    In February we asked   IVIG 1 g/kg bw - every 4 weeks  But insurance just approved this dose  She kept getting 1 g/kg bw every 6 weeks since October 2023     From the next infusion she will do IVIG 1 g/kg bw every 4 weeks    Mild anemia 11.8/36.2  White count nml  Plt count nml    AST 45  ALT nml  GFR nml    PTH 76  Calcium 9.9  Vit d nml  PCP is following this    Derm agreed that she has a flare of her dermatomyositis  Flare has subsided  She attributes the flare to dose reduction of xeljanz to 5 mg daily and daily exposure to sun while driving without a sunscreen  But all that has changed    She has hypopigmentation on the hands and around the eyes       Plan     Lets stay on this regimen and not make any changes   Xeljanz 5 mg bid  IVIG 1 g/kg bw every 4 weeks    Ck,aldolase,esr.crp    Dermatology -f/u    Sun screen overtly emphasised    - continue muscle strengthening exercise daily    CBC,CMP,ESR,CRP,ck,aldolase in 3 months    Follow up in 3 months     Vaccines  Covid x 2  Booster needed  Flu utd  Pneumonia utd  Shingles     DXA-osteopenia -2020  DEXA 2022  LOW BONE MINERAL DENSITY WITH A SIGNIFICANT DECREASE OF 5% IN THE LUMBAR SPINE BMD COMPARED TO THE PRIOR STUDY.  THE ESTIMATED 10 YEAR PROBABILITY OF HIP FRACTURE IS 0.2% AND OF A MAJOR OSTEOPOROTIC FRACTURE 3.2% RESPECTIVELY USING FRAX.  WE GAVE HER  - 1200 mg dietary calcium and Vitamin D3 1000 mg daily  New dexa ordered        Mammos are UTD      Answers submitted by the patient for this  visit:  Rheumatology Questionnaire (Submitted on 5/9/2024)  mouth sores: No  trouble swallowing: No  unexpected weight change: No  genital sore: No

## 2024-05-15 NOTE — PROGRESS NOTES
5/9/2024     8:27 PM   Rapid3 Question Responses and Scores   MDHAQ Score 0.2   Psychologic Score 3.3   Pain Score 1   When you awakened in the morning OVER THE LAST WEEK, did you feel stiff? Yes   Fatigue Score 1   Global Health Score 2   RAPID3 Score 1.22     Answers submitted by the patient for this visit:  Rheumatology Questionnaire (Submitted on 5/9/2024)  fever: No  eye redness: No  mouth sores: No  headaches: No  shortness of breath: No  chest pain: No  trouble swallowing: No  diarrhea: No  constipation: Yes  unexpected weight change: No  genital sore: No  dysuria: No  During the last 3 days, have you had a skin rash?: No  Bruises or bleeds easily: No  cough: No

## 2024-05-16 LAB
ALDOLASE SERPL-CCNC: 2.9 U/L (ref 1.2–7.6)
GAMMA INTERFERON BACKGROUND BLD IA-ACNC: 0.09 IU/ML
M TB IFN-G CD4+ BCKGRND COR BLD-ACNC: 0.06 IU/ML
M TB IFN-G CD4+ BCKGRND COR BLD-ACNC: 0.09 IU/ML
MITOGEN IGNF BCKGRD COR BLD-ACNC: 9.91 IU/ML
TB GOLD PLUS: NEGATIVE

## 2024-05-17 DIAGNOSIS — M33.13 DERMATOMYOSITIS: Primary | ICD-10-CM

## 2024-06-05 ENCOUNTER — INFUSION (OUTPATIENT)
Dept: INFECTIOUS DISEASES | Facility: HOSPITAL | Age: 64
End: 2024-06-05
Payer: COMMERCIAL

## 2024-06-05 VITALS
TEMPERATURE: 98 F | WEIGHT: 185.94 LBS | BODY MASS INDEX: 30.98 KG/M2 | OXYGEN SATURATION: 100 % | HEIGHT: 65 IN | SYSTOLIC BLOOD PRESSURE: 153 MMHG | HEART RATE: 98 BPM | RESPIRATION RATE: 18 BRPM | DIASTOLIC BLOOD PRESSURE: 83 MMHG

## 2024-06-05 DIAGNOSIS — M35.9 POLYMYOSITIS ASSOCIATED WITH AUTOIMMUNE DISEASE: Primary | ICD-10-CM

## 2024-06-05 DIAGNOSIS — M33.20 POLYMYOSITIS ASSOCIATED WITH AUTOIMMUNE DISEASE: Primary | ICD-10-CM

## 2024-06-05 PROCEDURE — 25000003 PHARM REV CODE 250: Performed by: INTERNAL MEDICINE

## 2024-06-05 PROCEDURE — 96375 TX/PRO/DX INJ NEW DRUG ADDON: CPT

## 2024-06-05 PROCEDURE — 63600175 PHARM REV CODE 636 W HCPCS: Mod: JZ,JG | Performed by: INTERNAL MEDICINE

## 2024-06-05 PROCEDURE — 96365 THER/PROPH/DIAG IV INF INIT: CPT

## 2024-06-05 PROCEDURE — 96366 THER/PROPH/DIAG IV INF ADDON: CPT

## 2024-06-05 RX ORDER — HEPARIN 100 UNIT/ML
500 SYRINGE INTRAVENOUS
OUTPATIENT
Start: 2024-07-03

## 2024-06-05 RX ORDER — SODIUM CHLORIDE 0.9 % (FLUSH) 0.9 %
10 SYRINGE (ML) INJECTION
OUTPATIENT
Start: 2024-07-03

## 2024-06-05 RX ORDER — DIPHENHYDRAMINE HYDROCHLORIDE 50 MG/ML
25 INJECTION INTRAMUSCULAR; INTRAVENOUS
OUTPATIENT
Start: 2024-07-03

## 2024-06-05 RX ORDER — DIPHENHYDRAMINE HYDROCHLORIDE 50 MG/ML
25 INJECTION INTRAMUSCULAR; INTRAVENOUS
Status: COMPLETED | OUTPATIENT
Start: 2024-06-05 | End: 2024-06-05

## 2024-06-05 RX ORDER — ACETAMINOPHEN 325 MG/1
650 TABLET ORAL
OUTPATIENT
Start: 2024-07-03

## 2024-06-05 RX ORDER — SODIUM CHLORIDE 0.9 % (FLUSH) 0.9 %
10 SYRINGE (ML) INJECTION
Status: DISCONTINUED | OUTPATIENT
Start: 2024-06-05 | End: 2024-06-05 | Stop reason: HOSPADM

## 2024-06-05 RX ORDER — FAMOTIDINE 10 MG/ML
20 INJECTION INTRAVENOUS
OUTPATIENT
Start: 2024-07-03

## 2024-06-05 RX ORDER — ACETAMINOPHEN 325 MG/1
650 TABLET ORAL
Status: COMPLETED | OUTPATIENT
Start: 2024-06-05 | End: 2024-06-05

## 2024-06-05 RX ORDER — FAMOTIDINE 10 MG/ML
20 INJECTION INTRAVENOUS
Status: COMPLETED | OUTPATIENT
Start: 2024-06-05 | End: 2024-06-05

## 2024-06-05 RX ADMIN — FAMOTIDINE 20 MG: 10 INJECTION, SOLUTION INTRAVENOUS at 08:06

## 2024-06-05 RX ADMIN — SODIUM CHLORIDE: 9 INJECTION, SOLUTION INTRAVENOUS at 08:06

## 2024-06-05 RX ADMIN — ACETAMINOPHEN 650 MG: 325 TABLET ORAL at 08:06

## 2024-06-05 RX ADMIN — DIPHENHYDRAMINE HYDROCHLORIDE 25 MG: 50 INJECTION INTRAMUSCULAR; INTRAVENOUS at 08:06

## 2024-06-05 RX ADMIN — HUMAN IMMUNOGLOBULIN G 85 G: 40 LIQUID INTRAVENOUS at 08:06

## 2024-06-05 NOTE — PROGRESS NOTES
Patient arrived for Privigen infusion. Tylenol 650 mg PO, Pepcid 20 mg IVP, and Benadryl 25 mg slow IVP administered 30 minutes prior to infusion started.    Privigen initiated at the following rates with titration every 30 minutes; 25ml/hr-51ml/hr-101ml/hr-202 ml/hr until infusion volume is completed. NS  250cc flush bag @ 25ml/hr concurrently running with Privigen. Patient tolerating infusion well.    Next appointment scheduled and future appointments made till August. Patient made aware.    Limited head-to-toe assessment due to privacy issues and visit reason though the opportunity was given for patient to express any concerns.      Patient arrives for infusion of Privigen as ordered by Dr. Caleb Madera. All benefits, risks, requirements, and alternatives should have been discussed with patient by ordering provider at the time the order was placed.

## 2024-06-10 NOTE — PROGRESS NOTES
Subjective:       Patient ID: Ermelinda Verde is a 63 y.o. female.    Chief Complaint: No chief complaint on file.      HPI Mrs Verde is a 62-year-old -American female who returns for follow-up of a history of recurrent triple negative left breast cancer - S/P chemo-immunotherapy.      Her course has been complicated by the development of auto-immune dermatomyositis.  She has been on IVIG every 8 weeks and daily tofacitinib.  Her symptoms have generally been under good control with that combination, although she has having a flare now with some facial rash and swelling in her hands.  She denies any shortness of breath or unusual pain.    Her swallowing has been good but she does feel like mucus gets caught in her throat at night.  She has some upcoming neck imaging.             Onc History:  She developed a palpable abnormality in her left breast in January 2017 which she noted on self-examination.  A diagnostic mammogram on January 19 showed a greater than 1 cm nodule in the upper outer portion of left breast.  By ultrasound this was lobulated and hypoechoic measuring 1.75 x 1.51 x 1.96 cm.     On January 24, 2017 a core needle biopsy was performed which showed infiltrating ductal carcinoma, high grade.  The tumor was ER negative, MI negative, and HER-2 negative.  A follow-up ultrasound on December 6 showed 2.5 x 2.2 x 1.5 cm left breast mass.  There was no abnormality noted in the left axilla.     She underwent sentinel lymph node biopsy on February 22.  That showed 4 negative lymph nodes.     She had 4 cycles of  Wilbur-adjuvantTaxotere and Cytoxan completed  on 5/9/17.     On June 26 she underwent left mastectomy.  That revealed 2 foci of invasive high-grade carcinoma measuring 14 mm and 1.5 mm.  Margins were negative.    She completed 4 cycles of adjuvant Adriamycin on September 12, 2017.      In October 2018, she developed some left supraclavicular lymphadenopathy which turned out to be recurrence.      A fine-needle aspirate of the lymph node was performed on October 19th.  That showed metastatic carcinoma consistent with breast primary which was ER negative, VT negative and HER 2-negative.    She then received 3 cycles of Nab paclitaxel and atezolizumab.  She last received treatment on January 3, 2019.    PET-CT in March, 2019 showed complete response.    She then had consolidative radiation therapy in May 2019.    Her treatment was complicated by the development of dermatomyositis which resulted in the lengthy hospitalization from January 22nd to February 13th, 2019.  She is followed by Rheumatology and has been treated with steroids, IVIG, and low-dose methotrexate, and Cell-cept.  She received rituximab therapy 4/13/2- and 4/27/20.    CT Abd/Pelvis in May 2023 - [facility]ASS[facility]DRA    Mammogram in December 2023- negative  Review of Systems   Constitutional:  Negative for appetite change and unexpected weight change.   HENT:  Negative for congestion, mouth sores and sinus pressure.    Eyes:  Negative for visual disturbance.   Respiratory:  Negative for cough and shortness of breath.    Cardiovascular:  Negative for chest pain.   Gastrointestinal:  Negative for abdominal pain, constipation and diarrhea.   Genitourinary:  Negative for frequency.   Musculoskeletal:  Positive for arthralgias (hands). Negative for back pain.   Skin:  Positive for rash (facial).   Neurological:  Negative for weakness and headaches.   Hematological:  Negative for adenopathy.   Psychiatric/Behavioral:  The patient is not nervous/anxious.        Objective:      Physical Exam  Vitals reviewed.   Constitutional:       General: She is not in acute distress.     Appearance: She is well-developed.   Eyes:      General: No scleral icterus.  Cardiovascular:      Rate and Rhythm: Normal rate and regular rhythm.   Pulmonary:      Effort: Pulmonary effort is normal.      Breath sounds: Normal breath sounds. No wheezing or rales.   Chest:   Breasts:     Right:  Normal. No mass, nipple discharge or skin change.       Abdominal:      Palpations: Abdomen is soft. There is no mass.      Tenderness: There is no abdominal tenderness.   Lymphadenopathy:      Cervical: No cervical adenopathy.      Upper Body:      Right upper body: No supraclavicular or axillary adenopathy.      Left upper body: No supraclavicular or axillary adenopathy.   Skin:     Findings: No rash.      Comments: tigtness of skin in chest /neck with post radiation change, left chest wall   Neurological:      Mental Status: She is alert and oriented to person, place, and time.   Psychiatric:         Behavior: Behavior normal.         Thought Content: Thought content normal.         Assessment:       1. History of breast cancer in female    2. Polymyositis associated with autoimmune disease        Plan:     RTC 6 M with mammograms          Route Chart for Scheduling    Med Onc Chart Routing      Follow up with physician 6 months.   Follow up with STEVIE    Infusion scheduling note    Injection scheduling note    Labs None   Scheduling:  Preferred lab:  Lab interval:     Imaging Mammogram      Pharmacy appointment No pharmacy appointment needed      Other referrals no referral to Oncology Primary Care needed -  no Massage appointment needed    No additional referrals needed

## 2024-06-11 ENCOUNTER — HOSPITAL ENCOUNTER (OUTPATIENT)
Dept: RADIOLOGY | Facility: HOSPITAL | Age: 64
Discharge: HOME | End: 2024-06-11
Attending: INTERNAL MEDICINE
Payer: COMMERCIAL

## 2024-06-11 ENCOUNTER — OFFICE VISIT (OUTPATIENT)
Dept: HEMATOLOGY/ONCOLOGY | Facility: CLINIC | Age: 64
End: 2024-06-11
Payer: COMMERCIAL

## 2024-06-11 VITALS
DIASTOLIC BLOOD PRESSURE: 89 MMHG | HEIGHT: 65 IN | HEART RATE: 109 BPM | TEMPERATURE: 98 F | BODY MASS INDEX: 30.89 KG/M2 | OXYGEN SATURATION: 100 % | SYSTOLIC BLOOD PRESSURE: 163 MMHG | WEIGHT: 185.44 LBS | RESPIRATION RATE: 20 BRPM

## 2024-06-11 DIAGNOSIS — M35.9 POLYMYOSITIS ASSOCIATED WITH AUTOIMMUNE DISEASE: ICD-10-CM

## 2024-06-11 DIAGNOSIS — Z85.3 HISTORY OF BREAST CANCER: ICD-10-CM

## 2024-06-11 DIAGNOSIS — M33.20 POLYMYOSITIS ASSOCIATED WITH AUTOIMMUNE DISEASE: ICD-10-CM

## 2024-06-11 DIAGNOSIS — R22.42 MASS OF LEFT LOWER EXTREMITY: ICD-10-CM

## 2024-06-11 DIAGNOSIS — E21.3 HYPERPARATHYROIDISM: ICD-10-CM

## 2024-06-11 DIAGNOSIS — Z85.3 HISTORY OF BREAST CANCER IN FEMALE: Primary | ICD-10-CM

## 2024-06-11 LAB
CREAT SERPL-MCNC: 0.9 MG/DL (ref 0.5–1.4)
SAMPLE: NORMAL

## 2024-06-11 PROCEDURE — 76536 US EXAM OF HEAD AND NECK: CPT | Mod: 26,,, | Performed by: INTERNAL MEDICINE

## 2024-06-11 PROCEDURE — 3008F BODY MASS INDEX DOCD: CPT | Mod: CPTII,S$GLB,, | Performed by: INTERNAL MEDICINE

## 2024-06-11 PROCEDURE — 3044F HG A1C LEVEL LT 7.0%: CPT | Mod: CPTII,S$GLB,, | Performed by: INTERNAL MEDICINE

## 2024-06-11 PROCEDURE — 3079F DIAST BP 80-89 MM HG: CPT | Mod: CPTII,S$GLB,, | Performed by: INTERNAL MEDICINE

## 2024-06-11 PROCEDURE — 99999 PR PBB SHADOW E&M-EST. PATIENT-LVL IV: CPT | Mod: PBBFAC,,, | Performed by: INTERNAL MEDICINE

## 2024-06-11 PROCEDURE — 3066F NEPHROPATHY DOC TX: CPT | Mod: CPTII,S$GLB,, | Performed by: INTERNAL MEDICINE

## 2024-06-11 PROCEDURE — 73701 CT LOWER EXTREMITY W/DYE: CPT | Mod: TC,LT

## 2024-06-11 PROCEDURE — 3061F NEG MICROALBUMINURIA REV: CPT | Mod: CPTII,S$GLB,, | Performed by: INTERNAL MEDICINE

## 2024-06-11 PROCEDURE — 73701 CT LOWER EXTREMITY W/DYE: CPT | Mod: 26,LT,, | Performed by: RADIOLOGY

## 2024-06-11 PROCEDURE — 76536 US EXAM OF HEAD AND NECK: CPT | Mod: TC

## 2024-06-11 PROCEDURE — 4010F ACE/ARB THERAPY RXD/TAKEN: CPT | Mod: CPTII,S$GLB,, | Performed by: INTERNAL MEDICINE

## 2024-06-11 PROCEDURE — 99213 OFFICE O/P EST LOW 20 MIN: CPT | Mod: S$GLB,,, | Performed by: INTERNAL MEDICINE

## 2024-06-11 PROCEDURE — 1159F MED LIST DOCD IN RCRD: CPT | Mod: CPTII,S$GLB,, | Performed by: INTERNAL MEDICINE

## 2024-06-11 PROCEDURE — 25500020 PHARM REV CODE 255: Performed by: INTERNAL MEDICINE

## 2024-06-11 PROCEDURE — 3077F SYST BP >= 140 MM HG: CPT | Mod: CPTII,S$GLB,, | Performed by: INTERNAL MEDICINE

## 2024-06-11 RX ADMIN — IOHEXOL 100 ML: 350 INJECTION, SOLUTION INTRAVENOUS at 12:06

## 2024-07-03 ENCOUNTER — INFUSION (OUTPATIENT)
Dept: INFECTIOUS DISEASES | Facility: HOSPITAL | Age: 64
End: 2024-07-03
Payer: COMMERCIAL

## 2024-07-03 VITALS
HEART RATE: 101 BPM | HEIGHT: 65 IN | BODY MASS INDEX: 31.36 KG/M2 | RESPIRATION RATE: 18 BRPM | OXYGEN SATURATION: 100 % | DIASTOLIC BLOOD PRESSURE: 73 MMHG | WEIGHT: 188.25 LBS | TEMPERATURE: 98 F | SYSTOLIC BLOOD PRESSURE: 133 MMHG

## 2024-07-03 DIAGNOSIS — M33.20 POLYMYOSITIS ASSOCIATED WITH AUTOIMMUNE DISEASE: Primary | ICD-10-CM

## 2024-07-03 DIAGNOSIS — M35.9 POLYMYOSITIS ASSOCIATED WITH AUTOIMMUNE DISEASE: Primary | ICD-10-CM

## 2024-07-03 PROCEDURE — 96365 THER/PROPH/DIAG IV INF INIT: CPT

## 2024-07-03 PROCEDURE — 63600175 PHARM REV CODE 636 W HCPCS: Performed by: INTERNAL MEDICINE

## 2024-07-03 PROCEDURE — 96374 THER/PROPH/DIAG INJ IV PUSH: CPT

## 2024-07-03 PROCEDURE — 25000003 PHARM REV CODE 250: Performed by: INTERNAL MEDICINE

## 2024-07-03 PROCEDURE — 96366 THER/PROPH/DIAG IV INF ADDON: CPT

## 2024-07-03 PROCEDURE — 96375 TX/PRO/DX INJ NEW DRUG ADDON: CPT

## 2024-07-03 RX ORDER — HEPARIN 100 UNIT/ML
500 SYRINGE INTRAVENOUS
OUTPATIENT
Start: 2024-07-31

## 2024-07-03 RX ORDER — FAMOTIDINE 10 MG/ML
20 INJECTION INTRAVENOUS
Status: COMPLETED | OUTPATIENT
Start: 2024-07-03 | End: 2024-07-03

## 2024-07-03 RX ORDER — ACETAMINOPHEN 325 MG/1
650 TABLET ORAL
Status: COMPLETED | OUTPATIENT
Start: 2024-07-03 | End: 2024-07-03

## 2024-07-03 RX ORDER — SODIUM CHLORIDE 0.9 % (FLUSH) 0.9 %
10 SYRINGE (ML) INJECTION
OUTPATIENT
Start: 2024-07-31

## 2024-07-03 RX ORDER — SODIUM CHLORIDE 0.9 % (FLUSH) 0.9 %
10 SYRINGE (ML) INJECTION
Status: DISCONTINUED | OUTPATIENT
Start: 2024-07-03 | End: 2024-07-03 | Stop reason: HOSPADM

## 2024-07-03 RX ORDER — ACETAMINOPHEN 325 MG/1
650 TABLET ORAL
OUTPATIENT
Start: 2024-07-31

## 2024-07-03 RX ORDER — DIPHENHYDRAMINE HYDROCHLORIDE 50 MG/ML
25 INJECTION INTRAMUSCULAR; INTRAVENOUS
Status: COMPLETED | OUTPATIENT
Start: 2024-07-03 | End: 2024-07-03

## 2024-07-03 RX ORDER — DIPHENHYDRAMINE HYDROCHLORIDE 50 MG/ML
25 INJECTION INTRAMUSCULAR; INTRAVENOUS
OUTPATIENT
Start: 2024-07-31

## 2024-07-03 RX ORDER — FAMOTIDINE 10 MG/ML
20 INJECTION INTRAVENOUS
OUTPATIENT
Start: 2024-07-31

## 2024-07-03 RX ADMIN — ACETAMINOPHEN 650 MG: 325 TABLET ORAL at 08:07

## 2024-07-03 RX ADMIN — FAMOTIDINE 20 MG: 10 INJECTION, SOLUTION INTRAVENOUS at 08:07

## 2024-07-03 RX ADMIN — DIPHENHYDRAMINE HYDROCHLORIDE 25 MG: 50 INJECTION, SOLUTION INTRAMUSCULAR; INTRAVENOUS at 08:07

## 2024-07-03 RX ADMIN — HUMAN IMMUNOGLOBULIN G 85 G: 40 LIQUID INTRAVENOUS at 09:07

## 2024-07-03 RX ADMIN — SODIUM CHLORIDE: 9 INJECTION, SOLUTION INTRAVENOUS at 08:07

## 2024-07-03 NOTE — PROGRESS NOTES
Patient arrived for Privigen infusion. Tylenol 650 mg PO, Pepcid 20 mg IVP, and Benadryl 25 mg slow IVP administered 30 minutes prior to infusion started.    Privigen initiated at the following rates and will titrate every 30 minutes; 26 ml/hr-51ml/hr-102 ml/hr-204 ml/hr until infusion volume is completed. NS  250cc flush bag @ 25ml/hr concurrently running with Privigen.     Next appointment scheduled and future appointments made till August. Patient made aware.    Limited head-to-toe assessment due to privacy issues and visit reason though the opportunity was given for patient to express any concerns.      Patient arrives for infusion of Privigen as ordered by Dr. Caleb Madera. All benefits, risks, requirements, and alternatives should have been discussed with patient by ordering provider at the time the order was placed.

## 2024-07-25 ENCOUNTER — PATIENT MESSAGE (OUTPATIENT)
Dept: RHEUMATOLOGY | Facility: CLINIC | Age: 64
End: 2024-07-25
Payer: COMMERCIAL

## 2024-07-25 DIAGNOSIS — M33.13 DERMATOMYOSITIS: Primary | ICD-10-CM

## 2024-07-31 ENCOUNTER — INFUSION (OUTPATIENT)
Dept: INFECTIOUS DISEASES | Facility: HOSPITAL | Age: 64
End: 2024-07-31
Payer: COMMERCIAL

## 2024-07-31 VITALS
TEMPERATURE: 98 F | WEIGHT: 184.88 LBS | SYSTOLIC BLOOD PRESSURE: 150 MMHG | DIASTOLIC BLOOD PRESSURE: 94 MMHG | RESPIRATION RATE: 18 BRPM | BODY MASS INDEX: 30.8 KG/M2 | OXYGEN SATURATION: 100 % | HEIGHT: 65 IN | HEART RATE: 93 BPM

## 2024-07-31 DIAGNOSIS — M33.20 POLYMYOSITIS ASSOCIATED WITH AUTOIMMUNE DISEASE: Primary | ICD-10-CM

## 2024-07-31 DIAGNOSIS — M35.9 POLYMYOSITIS ASSOCIATED WITH AUTOIMMUNE DISEASE: Primary | ICD-10-CM

## 2024-07-31 PROCEDURE — 96365 THER/PROPH/DIAG IV INF INIT: CPT

## 2024-07-31 PROCEDURE — 63600175 PHARM REV CODE 636 W HCPCS: Mod: JZ,JG | Performed by: INTERNAL MEDICINE

## 2024-07-31 PROCEDURE — 96375 TX/PRO/DX INJ NEW DRUG ADDON: CPT

## 2024-07-31 PROCEDURE — 25000003 PHARM REV CODE 250: Performed by: INTERNAL MEDICINE

## 2024-07-31 PROCEDURE — 96366 THER/PROPH/DIAG IV INF ADDON: CPT

## 2024-07-31 RX ORDER — DIPHENHYDRAMINE HYDROCHLORIDE 50 MG/ML
25 INJECTION INTRAMUSCULAR; INTRAVENOUS
OUTPATIENT
Start: 2024-08-28

## 2024-07-31 RX ORDER — SODIUM CHLORIDE 0.9 % (FLUSH) 0.9 %
10 SYRINGE (ML) INJECTION
Status: DISCONTINUED | OUTPATIENT
Start: 2024-07-31 | End: 2024-07-31 | Stop reason: HOSPADM

## 2024-07-31 RX ORDER — FAMOTIDINE 10 MG/ML
20 INJECTION INTRAVENOUS
OUTPATIENT
Start: 2024-08-28

## 2024-07-31 RX ORDER — HEPARIN 100 UNIT/ML
500 SYRINGE INTRAVENOUS
OUTPATIENT
Start: 2024-08-28

## 2024-07-31 RX ORDER — ACETAMINOPHEN 325 MG/1
650 TABLET ORAL
OUTPATIENT
Start: 2024-08-28

## 2024-07-31 RX ORDER — FAMOTIDINE 10 MG/ML
20 INJECTION INTRAVENOUS
Status: COMPLETED | OUTPATIENT
Start: 2024-07-31 | End: 2024-07-31

## 2024-07-31 RX ORDER — DIPHENHYDRAMINE HYDROCHLORIDE 50 MG/ML
25 INJECTION INTRAMUSCULAR; INTRAVENOUS
Status: COMPLETED | OUTPATIENT
Start: 2024-07-31 | End: 2024-07-31

## 2024-07-31 RX ORDER — SODIUM CHLORIDE 0.9 % (FLUSH) 0.9 %
10 SYRINGE (ML) INJECTION
OUTPATIENT
Start: 2024-08-28

## 2024-07-31 RX ORDER — ACETAMINOPHEN 325 MG/1
650 TABLET ORAL
Status: COMPLETED | OUTPATIENT
Start: 2024-07-31 | End: 2024-07-31

## 2024-07-31 RX ADMIN — DIPHENHYDRAMINE HYDROCHLORIDE 25 MG: 50 INJECTION INTRAMUSCULAR; INTRAVENOUS at 08:07

## 2024-07-31 RX ADMIN — FAMOTIDINE 20 MG: 10 INJECTION, SOLUTION INTRAVENOUS at 08:07

## 2024-07-31 RX ADMIN — ACETAMINOPHEN 650 MG: 325 TABLET ORAL at 08:07

## 2024-07-31 RX ADMIN — SODIUM CHLORIDE: 9 INJECTION, SOLUTION INTRAVENOUS at 08:07

## 2024-07-31 RX ADMIN — HUMAN IMMUNOGLOBULIN G 85 G: 40 LIQUID INTRAVENOUS at 08:07

## 2024-07-31 NOTE — PROGRESS NOTES
Patient here receiving Privigen 85 g infusion as ordered today. Tylenol 650 mg PO, Pepcid 20 mg IVP, and Benadryl 25 mg slow IVP administered 30 minutes prior to infusion started.    Privigen initiated at the following rates with titration every 30 minutes; 25 ml/hr-50 ml/hr-100 ml/hr and max rate of 201 ml/hr until infusion volume is completed. NS  250cc flush bag @ 25ml/hr concurrently running with Privigen. Patient tolerating infusion well.    Next appointment scheduled and future appointments made till September 2024. Patient made aware.    Limited head-to-toe assessment due to privacy issues and visit reason though the opportunity was given for patient to express any concerns.      Patient here for infusion of Privigen as ordered by Dr. Caleb Madera. All benefits, risks, requirements, and alternatives should have been discussed with patient by ordering provider at the time the order was placed.

## 2024-08-12 ENCOUNTER — TELEPHONE (OUTPATIENT)
Dept: PRIMARY CARE CLINIC | Facility: CLINIC | Age: 64
End: 2024-08-12
Payer: COMMERCIAL

## 2024-08-12 DIAGNOSIS — Z12.31 SCREENING MAMMOGRAM FOR BREAST CANCER: Primary | ICD-10-CM

## 2024-08-13 ENCOUNTER — OFFICE VISIT (OUTPATIENT)
Dept: PRIMARY CARE CLINIC | Facility: CLINIC | Age: 64
End: 2024-08-13
Payer: COMMERCIAL

## 2024-08-13 VITALS
HEART RATE: 93 BPM | WEIGHT: 185.63 LBS | SYSTOLIC BLOOD PRESSURE: 148 MMHG | OXYGEN SATURATION: 97 % | BODY MASS INDEX: 30.93 KG/M2 | HEIGHT: 65 IN | DIASTOLIC BLOOD PRESSURE: 78 MMHG | TEMPERATURE: 98 F

## 2024-08-13 DIAGNOSIS — M33.13 DERMATOMYOSITIS: ICD-10-CM

## 2024-08-13 DIAGNOSIS — Z90.12 S/P LEFT MASTECTOMY: ICD-10-CM

## 2024-08-13 DIAGNOSIS — E11.9 CONTROLLED TYPE 2 DIABETES MELLITUS WITHOUT COMPLICATION, WITHOUT LONG-TERM CURRENT USE OF INSULIN: Primary | ICD-10-CM

## 2024-08-13 DIAGNOSIS — R09.82 POSTNASAL DRIP: ICD-10-CM

## 2024-08-13 DIAGNOSIS — I10 BENIGN ESSENTIAL HYPERTENSION: ICD-10-CM

## 2024-08-13 DIAGNOSIS — K86.9 PANCREATIC LESION: ICD-10-CM

## 2024-08-13 DIAGNOSIS — Z85.3 HISTORY OF BREAST CANCER: ICD-10-CM

## 2024-08-13 PROCEDURE — 99214 OFFICE O/P EST MOD 30 MIN: CPT | Mod: S$GLB,,, | Performed by: INTERNAL MEDICINE

## 2024-08-13 PROCEDURE — 3008F BODY MASS INDEX DOCD: CPT | Mod: CPTII,S$GLB,, | Performed by: INTERNAL MEDICINE

## 2024-08-13 PROCEDURE — 3044F HG A1C LEVEL LT 7.0%: CPT | Mod: CPTII,S$GLB,, | Performed by: INTERNAL MEDICINE

## 2024-08-13 PROCEDURE — 3066F NEPHROPATHY DOC TX: CPT | Mod: CPTII,S$GLB,, | Performed by: INTERNAL MEDICINE

## 2024-08-13 PROCEDURE — 3078F DIAST BP <80 MM HG: CPT | Mod: CPTII,S$GLB,, | Performed by: INTERNAL MEDICINE

## 2024-08-13 PROCEDURE — 4010F ACE/ARB THERAPY RXD/TAKEN: CPT | Mod: CPTII,S$GLB,, | Performed by: INTERNAL MEDICINE

## 2024-08-13 PROCEDURE — 1159F MED LIST DOCD IN RCRD: CPT | Mod: CPTII,S$GLB,, | Performed by: INTERNAL MEDICINE

## 2024-08-13 PROCEDURE — 3077F SYST BP >= 140 MM HG: CPT | Mod: CPTII,S$GLB,, | Performed by: INTERNAL MEDICINE

## 2024-08-13 PROCEDURE — 1160F RVW MEDS BY RX/DR IN RCRD: CPT | Mod: CPTII,S$GLB,, | Performed by: INTERNAL MEDICINE

## 2024-08-13 PROCEDURE — G2211 COMPLEX E/M VISIT ADD ON: HCPCS | Mod: S$GLB,,, | Performed by: INTERNAL MEDICINE

## 2024-08-13 PROCEDURE — 99999 PR PBB SHADOW E&M-EST. PATIENT-LVL IV: CPT | Mod: PBBFAC,,, | Performed by: INTERNAL MEDICINE

## 2024-08-13 PROCEDURE — 3061F NEG MICROALBUMINURIA REV: CPT | Mod: CPTII,S$GLB,, | Performed by: INTERNAL MEDICINE

## 2024-08-13 RX ORDER — TOFACITINIB 5 MG/1
5 TABLET, FILM COATED ORAL 2 TIMES DAILY
COMMUNITY

## 2024-08-13 RX ORDER — IPRATROPIUM BROMIDE 21 UG/1
2 SPRAY, METERED NASAL 2 TIMES DAILY
Qty: 30 ML | Refills: 3 | Status: SHIPPED | OUTPATIENT
Start: 2024-08-13

## 2024-08-13 NOTE — PROGRESS NOTES
Subjective:       Patient ID: Ermelinda Verde is a 63 y.o. female.    Chief Complaint: Diabetes    HPI  DM2 - A1c 5/2/24 6.4  MAC 5/2/24 - neg.     Dermatomyositis - currently undergoing IVIG q 4 wks and xeljanz 5mg BID.   This is controlling the condition well.     L breast CA s/p neoadjuvant chemo and s/p L mastectomy 2017.  Breast cancer recurrence at the L supraclavicular LN. S/p chemo that completed in 2019.  S/p XRT 2019.   Follows w/ Dr. Alex Reyna in H/O - lov 6/2024.  R MMG 12/15/23 neg. Scheduled for the next MMG and f/u w/ Dr. Alex Reyna.     Ct a/p 5/2023 (first seen in 5/2022) to look at pancreatic lesion showed: Reproductive organs: Lobulated bulky uterus with numerous calcifications, likely calcified fibroids. Mild dilation of the endometrial canal measuring 0.9 cm in this postmenopausal patient.   Pelvic US 5/4/23 - Borderline upper limits of normal of endometrial thickness with small amount of free fluid within the endometrial canal. Consultation with gynecology as clinically warranted.  Leiomyomatous uterus.  Seen by outside gyn (Dr. Verma) 6/20/24 - Atypical squamous cells cannot exclude high grade squamous intraepithelial lesion on cytologic smear of vagina. Colposcopy shows atrophic changes but no abnormality and small endocervical polyp removed w/ forceps.   Pt reports saw Dr. Verma recently in June and had US done that was reportedly normal.     CT A/P 5/2/23 - Pancreas: Stable 0.3 cm hypoattenuating lesion within the pancreatic tail without arterial uptake. No new pancreatic lesions identified. No mass or peripancreatic fat stranding.     HTN - benicar 5mg daily, procardia xl 30mg daily.   BPs at home 130s at the top. Doesn't check daily.     Had sinus issues a few weeks ago and started snoring. Not on her nasal spray; would need refill. No fevers/chills.     Review of Systems  Comprehensive review of systems otherwise negative. See history/subjective section for more  "details.    Objective:      Physical Exam    BP (!) 148/78 (BP Location: Right arm, Patient Position: Sitting, BP Method: Medium (Manual))   Pulse 93   Temp 97.6 °F (36.4 °C) (Oral)   Ht 5' 5" (1.651 m)   Wt 84.2 kg (185 lb 10 oz)   LMP  (LMP Unknown)   SpO2 97%   BMI 30.89 kg/m²     GEN - A+OX4, NAD   HEENT - PERRL, EOMI, OP clear. MMM.   Neck - No cervical LAD.   CV - RRR, no m/r   Chest - CTAB, no wheezing or rhonchi  Abd - S/NT/ND/+BS.   Ext - 2+BDP and radial pulses. No C/C/E.  Neuro - 5/5 BUE and BLE strength.  LN - No axillary or inguinal LAD appreciated.  Skin - gottron's papules on the hands. Radiation changes of the skin on the mid/L chest area.     Previous labs reviewed.     Assessment/Plan     Ermelinda was seen today for diabetes.    Diagnoses and all orders for this visit:    Controlled type 2 diabetes mellitus without complication, without long-term current use of insulin - diet controlled. Cont to watch diet. Exercise when cooler.     Dermatomyositis - doing well on IVIG and xeljanz. Will be establishing w a new rheum.     History of breast cancer - MMG scheduled in Dec.     S/P left mastectomy - f/u w/ Dr. Reyna.     Pancreatic lesion  -     CT Abdomen Pelvis  Without Contrast; Future    Benign essential hypertension - pt to bring her cuff and BP log to nurse's visit in 2 weeks. Cont olmesartan 5 and procardia 30 at this time. Has room to go up on either one if needed.    Postnasal drip - restart atrovent. If persistent snoring, consider sleep study.  -     ipratropium (ATROVENT) 21 mcg (0.03 %) nasal spray; 2 sprays by Each Nostril route 2 (two) times daily.        Follow up in about 2 months (around 10/13/2024).      Reta Weeks MD  Department of Internal Medicine - [facility]  7:47 AM    "

## 2024-08-28 ENCOUNTER — PATIENT MESSAGE (OUTPATIENT)
Dept: RHEUMATOLOGY | Facility: CLINIC | Age: 64
End: 2024-08-28
Payer: COMMERCIAL

## 2024-08-28 ENCOUNTER — INFUSION (OUTPATIENT)
Dept: INFECTIOUS DISEASES | Facility: HOSPITAL | Age: 64
End: 2024-08-28
Attending: INTERNAL MEDICINE
Payer: COMMERCIAL

## 2024-08-28 VITALS
DIASTOLIC BLOOD PRESSURE: 73 MMHG | BODY MASS INDEX: 30.89 KG/M2 | SYSTOLIC BLOOD PRESSURE: 141 MMHG | OXYGEN SATURATION: 100 % | HEIGHT: 65 IN | TEMPERATURE: 98 F | HEART RATE: 100 BPM | WEIGHT: 185.44 LBS | RESPIRATION RATE: 20 BRPM

## 2024-08-28 DIAGNOSIS — M35.9 POLYMYOSITIS ASSOCIATED WITH AUTOIMMUNE DISEASE: Primary | ICD-10-CM

## 2024-08-28 DIAGNOSIS — M33.20 POLYMYOSITIS ASSOCIATED WITH AUTOIMMUNE DISEASE: Primary | ICD-10-CM

## 2024-08-28 PROCEDURE — 96366 THER/PROPH/DIAG IV INF ADDON: CPT

## 2024-08-28 PROCEDURE — 96365 THER/PROPH/DIAG IV INF INIT: CPT

## 2024-08-28 PROCEDURE — 25000003 PHARM REV CODE 250: Performed by: INTERNAL MEDICINE

## 2024-08-28 PROCEDURE — 63600175 PHARM REV CODE 636 W HCPCS: Performed by: INTERNAL MEDICINE

## 2024-08-28 PROCEDURE — 96375 TX/PRO/DX INJ NEW DRUG ADDON: CPT

## 2024-08-28 RX ORDER — ACETAMINOPHEN 325 MG/1
650 TABLET ORAL
OUTPATIENT
Start: 2024-09-25

## 2024-08-28 RX ORDER — SODIUM CHLORIDE 0.9 % (FLUSH) 0.9 %
10 SYRINGE (ML) INJECTION
OUTPATIENT
Start: 2024-09-25

## 2024-08-28 RX ORDER — DIPHENHYDRAMINE HYDROCHLORIDE 50 MG/ML
25 INJECTION INTRAMUSCULAR; INTRAVENOUS
OUTPATIENT
Start: 2024-09-25

## 2024-08-28 RX ORDER — ACETAMINOPHEN 325 MG/1
650 TABLET ORAL
Status: COMPLETED | OUTPATIENT
Start: 2024-08-28 | End: 2024-08-28

## 2024-08-28 RX ORDER — DIPHENHYDRAMINE HYDROCHLORIDE 50 MG/ML
25 INJECTION INTRAMUSCULAR; INTRAVENOUS
Status: COMPLETED | OUTPATIENT
Start: 2024-08-28 | End: 2024-08-28

## 2024-08-28 RX ORDER — FAMOTIDINE 10 MG/ML
20 INJECTION INTRAVENOUS
OUTPATIENT
Start: 2024-09-25

## 2024-08-28 RX ORDER — SODIUM CHLORIDE 0.9 % (FLUSH) 0.9 %
10 SYRINGE (ML) INJECTION
Status: DISCONTINUED | OUTPATIENT
Start: 2024-08-28 | End: 2024-08-28 | Stop reason: HOSPADM

## 2024-08-28 RX ORDER — HEPARIN 100 UNIT/ML
500 SYRINGE INTRAVENOUS
OUTPATIENT
Start: 2024-09-25

## 2024-08-28 RX ORDER — FAMOTIDINE 10 MG/ML
20 INJECTION INTRAVENOUS
Status: COMPLETED | OUTPATIENT
Start: 2024-08-28 | End: 2024-08-28

## 2024-08-28 RX ADMIN — FAMOTIDINE 20 MG: 10 INJECTION, SOLUTION INTRAVENOUS at 08:08

## 2024-08-28 RX ADMIN — ACETAMINOPHEN 650 MG: 325 TABLET ORAL at 08:08

## 2024-08-28 RX ADMIN — DIPHENHYDRAMINE HYDROCHLORIDE 25 MG: 50 INJECTION INTRAMUSCULAR; INTRAVENOUS at 08:08

## 2024-08-28 RX ADMIN — SODIUM CHLORIDE: 9 INJECTION, SOLUTION INTRAVENOUS at 08:08

## 2024-08-28 RX ADMIN — HUMAN IMMUNOGLOBULIN G 85 G: 40 LIQUID INTRAVENOUS at 09:08

## 2024-08-28 NOTE — PROGRESS NOTES
Patient here receiving Privigen 85 g infusion as ordered today. Tylenol 650 mg PO, Pepcid 20 mg IVP, and Benadryl 25 mg slow IVP administered 30 minutes prior to infusion started.    Privigen initiated at the following rates with titration every 30 minutes; 25 ml/hr-50 ml/hr-100 ml/hr and max rate of 201 ml/hr until infusion volume is completed. NS  250cc flush bag @ 25ml/hr concurrently running with Privigen. Patient tolerating infusion well.    Next appointment scheduled and future appointments made till December 2024. Patient made aware.    Limited head-to-toe assessment due to privacy issues and visit reason though the opportunity was given for patient to express any concerns.    Patient here for infusion of Privigen as ordered by Dr. Caleb Madera. All benefits, risks, requirements, and alternatives should have been discussed with patient by ordering provider at the time the order was placed.

## 2024-08-29 ENCOUNTER — PATIENT MESSAGE (OUTPATIENT)
Dept: PRIMARY CARE CLINIC | Facility: CLINIC | Age: 64
End: 2024-08-29
Payer: COMMERCIAL

## 2024-08-29 VITALS — SYSTOLIC BLOOD PRESSURE: 135 MMHG | DIASTOLIC BLOOD PRESSURE: 84 MMHG

## 2024-09-06 ENCOUNTER — CLINICAL SUPPORT (OUTPATIENT)
Dept: PRIMARY CARE CLINIC | Facility: CLINIC | Age: 64
End: 2024-09-06
Payer: COMMERCIAL

## 2024-09-06 VITALS — SYSTOLIC BLOOD PRESSURE: 130 MMHG | OXYGEN SATURATION: 99 % | DIASTOLIC BLOOD PRESSURE: 80 MMHG | HEART RATE: 94 BPM

## 2024-09-06 DIAGNOSIS — I10 BENIGN ESSENTIAL HYPERTENSION: Primary | ICD-10-CM

## 2024-09-06 PROCEDURE — 99999 PR PBB SHADOW E&M-EST. PATIENT-LVL II: CPT | Mod: PBBFAC,,,

## 2024-09-06 NOTE — PROGRESS NOTES
Patient presented today for a nurse visit to check blood pressure. Pt's allergies, medications, medical history, and falls verified. Vital signs obtained. Blood pressure reading today was 130/80 on right. Home machine read 137/82 on right. Unable to get reading from left due to mastectomy. All readings will be sent to Dr Weeks for review. Education provided on adequate hydration and cardiac diet. AVS given to patient.

## 2024-09-26 ENCOUNTER — INFUSION (OUTPATIENT)
Dept: INFECTIOUS DISEASES | Facility: HOSPITAL | Age: 64
End: 2024-09-26
Payer: COMMERCIAL

## 2024-09-26 VITALS
BODY MASS INDEX: 30.88 KG/M2 | DIASTOLIC BLOOD PRESSURE: 76 MMHG | RESPIRATION RATE: 100 BRPM | HEART RATE: 111 BPM | WEIGHT: 185.31 LBS | HEIGHT: 65 IN | OXYGEN SATURATION: 100 % | SYSTOLIC BLOOD PRESSURE: 156 MMHG | TEMPERATURE: 98 F

## 2024-09-26 DIAGNOSIS — M35.9 POLYMYOSITIS ASSOCIATED WITH AUTOIMMUNE DISEASE: Primary | ICD-10-CM

## 2024-09-26 DIAGNOSIS — M33.20 POLYMYOSITIS ASSOCIATED WITH AUTOIMMUNE DISEASE: Primary | ICD-10-CM

## 2024-09-26 PROCEDURE — 63600175 PHARM REV CODE 636 W HCPCS: Mod: JZ,JG | Performed by: INTERNAL MEDICINE

## 2024-09-26 PROCEDURE — 96375 TX/PRO/DX INJ NEW DRUG ADDON: CPT

## 2024-09-26 PROCEDURE — 25000003 PHARM REV CODE 250: Performed by: INTERNAL MEDICINE

## 2024-09-26 PROCEDURE — 96366 THER/PROPH/DIAG IV INF ADDON: CPT

## 2024-09-26 PROCEDURE — 96365 THER/PROPH/DIAG IV INF INIT: CPT

## 2024-09-26 RX ORDER — SODIUM CHLORIDE 0.9 % (FLUSH) 0.9 %
10 SYRINGE (ML) INJECTION
Status: DISCONTINUED | OUTPATIENT
Start: 2024-09-26 | End: 2024-09-26 | Stop reason: HOSPADM

## 2024-09-26 RX ORDER — ACETAMINOPHEN 325 MG/1
650 TABLET ORAL
OUTPATIENT
Start: 2024-10-24

## 2024-09-26 RX ORDER — SODIUM CHLORIDE 0.9 % (FLUSH) 0.9 %
10 SYRINGE (ML) INJECTION
OUTPATIENT
Start: 2024-10-24

## 2024-09-26 RX ORDER — HEPARIN 100 UNIT/ML
500 SYRINGE INTRAVENOUS
OUTPATIENT
Start: 2024-10-24

## 2024-09-26 RX ORDER — FAMOTIDINE 10 MG/ML
20 INJECTION INTRAVENOUS
OUTPATIENT
Start: 2024-10-24

## 2024-09-26 RX ORDER — DIPHENHYDRAMINE HYDROCHLORIDE 50 MG/ML
25 INJECTION INTRAMUSCULAR; INTRAVENOUS
OUTPATIENT
Start: 2024-10-24

## 2024-09-26 RX ORDER — ACETAMINOPHEN 325 MG/1
650 TABLET ORAL
Status: COMPLETED | OUTPATIENT
Start: 2024-09-26 | End: 2024-09-26

## 2024-09-26 RX ORDER — DIPHENHYDRAMINE HYDROCHLORIDE 50 MG/ML
25 INJECTION INTRAMUSCULAR; INTRAVENOUS
Status: COMPLETED | OUTPATIENT
Start: 2024-09-26 | End: 2024-09-26

## 2024-09-26 RX ORDER — HEPARIN 100 UNIT/ML
500 SYRINGE INTRAVENOUS
Status: DISCONTINUED | OUTPATIENT
Start: 2024-09-26 | End: 2024-09-26 | Stop reason: HOSPADM

## 2024-09-26 RX ORDER — FAMOTIDINE 10 MG/ML
20 INJECTION INTRAVENOUS
Status: COMPLETED | OUTPATIENT
Start: 2024-09-26 | End: 2024-09-26

## 2024-09-26 RX ADMIN — DIPHENHYDRAMINE HYDROCHLORIDE 25 MG: 50 INJECTION, SOLUTION INTRAMUSCULAR; INTRAVENOUS at 08:09

## 2024-09-26 RX ADMIN — HUMAN IMMUNOGLOBULIN G 85 G: 40 LIQUID INTRAVENOUS at 09:09

## 2024-09-26 RX ADMIN — SODIUM CHLORIDE: 9 INJECTION, SOLUTION INTRAVENOUS at 08:09

## 2024-09-26 RX ADMIN — ACETAMINOPHEN 650 MG: 325 TABLET ORAL at 08:09

## 2024-09-26 RX ADMIN — FAMOTIDINE 20 MG: 10 INJECTION, SOLUTION INTRAVENOUS at 08:09

## 2024-09-30 ENCOUNTER — PATIENT OUTREACH (OUTPATIENT)
Dept: ADMINISTRATIVE | Facility: HOSPITAL | Age: 64
End: 2024-09-30
Payer: COMMERCIAL

## 2024-09-30 NOTE — LETTER
AUTHORIZATION FOR RELEASE OF   CONFIDENTIAL INFORMATION    Dear Shay Ambriz MD,    We are seeing Ermelinda Verde, date of birth 1960, in the clinic at [address]. Reta Weeks MD is the patient's PCP. Ermelinda Verde has an outstanding lab/procedure at the time we reviewed her chart. In order to help keep her health information updated, she has authorized us to request the following medical record(s):                                                    DIABETIC EYE EXAM                 Please fax records to [facility], Yu, Mary, [place], [phone].     If you have any questions, please contact:    Angela Martínez LPN Care Coordinator  [facility]  Phone: [phone]  FAX: [phone]           Patient Name: Ermelinda Verde  : 1960  Patient Phone #: [phone]

## 2024-09-30 NOTE — PROGRESS NOTES
Population Health Chart Review & Patient Outreach Details      Additional [facility] Health Notes:    BLUE ADVANTAGE Non-compliant report  DIABETIC EYE EXAM.   Chart review completed for HM test overdue (mammograms, Colonoscopies, pap smears, DM labs, and/or EYE EXAMs)      Care Everywhere and media, updates requested and reviewed.                 Updates Requested / Reviewed:      Care Everywhere and          Health Maintenance Topics Overdue:      VB Score: 1     Uncontrolled BP    Influenza Vaccine                  Health Maintenance Topic(s) Outreach Outcomes & Actions Taken:    Eye Exam - Outreach Outcomes & Actions Taken  : External Records Requested & Care Team Updated if Applicable

## 2024-09-30 NOTE — LETTER
AUTHORIZATION FOR RELEASE OF   CONFIDENTIAL INFORMATION    Dear Shay Ambriz MD,    We are seeing Emrelinda Verde, date of birth 1960, in the clinic at [address]. Reta Weeks MD is the patient's PCP. Ermelinda Verde has an outstanding lab/procedure at the time we reviewed her chart. In order to help keep her health information updated, she has authorized us to request the following medical record(s):                                                   DIABETIC EYE EXAM                  Please fax records to [facility], Yu, Mary, [place], [phone].     If you have any questions, please contact:    Angela Martínez LPN Care Coordinator  [facility]  Phone: [phone]  FAX: [phone]           Patient Name: Ermelinda Verde  : 1960  Patient Phone #: [phone]

## 2024-10-15 ENCOUNTER — OFFICE VISIT (OUTPATIENT)
Dept: PRIMARY CARE CLINIC | Facility: CLINIC | Age: 64
End: 2024-10-15
Payer: COMMERCIAL

## 2024-10-15 ENCOUNTER — OFFICE VISIT (OUTPATIENT)
Dept: RHEUMATOLOGY | Facility: CLINIC | Age: 64
End: 2024-10-15
Payer: COMMERCIAL

## 2024-10-15 VITALS
HEART RATE: 97 BPM | BODY MASS INDEX: 30.54 KG/M2 | SYSTOLIC BLOOD PRESSURE: 128 MMHG | HEIGHT: 65 IN | DIASTOLIC BLOOD PRESSURE: 62 MMHG | WEIGHT: 183.31 LBS | TEMPERATURE: 98 F | OXYGEN SATURATION: 99 %

## 2024-10-15 VITALS
HEART RATE: 96 BPM | DIASTOLIC BLOOD PRESSURE: 67 MMHG | HEIGHT: 65 IN | WEIGHT: 184.94 LBS | BODY MASS INDEX: 30.81 KG/M2 | SYSTOLIC BLOOD PRESSURE: 122 MMHG

## 2024-10-15 DIAGNOSIS — E78.5 HYPERLIPIDEMIA, UNSPECIFIED HYPERLIPIDEMIA TYPE: ICD-10-CM

## 2024-10-15 DIAGNOSIS — M33.13 DERMATOMYOSITIS: ICD-10-CM

## 2024-10-15 DIAGNOSIS — J10.1 INFLUENZA A: Primary | ICD-10-CM

## 2024-10-15 DIAGNOSIS — D84.9 IMMUNOSUPPRESSION: ICD-10-CM

## 2024-10-15 DIAGNOSIS — E21.3 HYPERPARATHYROIDISM: ICD-10-CM

## 2024-10-15 DIAGNOSIS — I10 BENIGN ESSENTIAL HYPERTENSION: ICD-10-CM

## 2024-10-15 DIAGNOSIS — M33.13 DERMATOMYOSITIS: Primary | ICD-10-CM

## 2024-10-15 DIAGNOSIS — E66.9 DIABETES MELLITUS TYPE 2 IN OBESE: ICD-10-CM

## 2024-10-15 DIAGNOSIS — E11.69 DIABETES MELLITUS TYPE 2 IN OBESE: ICD-10-CM

## 2024-10-15 PROCEDURE — 99215 OFFICE O/P EST HI 40 MIN: CPT | Mod: S$GLB,,, | Performed by: INTERNAL MEDICINE

## 2024-10-15 PROCEDURE — 3061F NEG MICROALBUMINURIA REV: CPT | Mod: CPTII,S$GLB,, | Performed by: INTERNAL MEDICINE

## 2024-10-15 PROCEDURE — 1160F RVW MEDS BY RX/DR IN RCRD: CPT | Mod: CPTII,S$GLB,, | Performed by: INTERNAL MEDICINE

## 2024-10-15 PROCEDURE — 1159F MED LIST DOCD IN RCRD: CPT | Mod: CPTII,S$GLB,, | Performed by: INTERNAL MEDICINE

## 2024-10-15 PROCEDURE — 3078F DIAST BP <80 MM HG: CPT | Mod: CPTII,S$GLB,, | Performed by: INTERNAL MEDICINE

## 2024-10-15 PROCEDURE — 3066F NEPHROPATHY DOC TX: CPT | Mod: CPTII,S$GLB,, | Performed by: INTERNAL MEDICINE

## 2024-10-15 PROCEDURE — 99999 PR PBB SHADOW E&M-EST. PATIENT-LVL V: CPT | Mod: PBBFAC,,, | Performed by: INTERNAL MEDICINE

## 2024-10-15 PROCEDURE — 3008F BODY MASS INDEX DOCD: CPT | Mod: CPTII,S$GLB,, | Performed by: INTERNAL MEDICINE

## 2024-10-15 PROCEDURE — 3044F HG A1C LEVEL LT 7.0%: CPT | Mod: CPTII,S$GLB,, | Performed by: INTERNAL MEDICINE

## 2024-10-15 PROCEDURE — 3074F SYST BP LT 130 MM HG: CPT | Mod: CPTII,S$GLB,, | Performed by: INTERNAL MEDICINE

## 2024-10-15 PROCEDURE — 4010F ACE/ARB THERAPY RXD/TAKEN: CPT | Mod: CPTII,S$GLB,, | Performed by: INTERNAL MEDICINE

## 2024-10-15 PROCEDURE — 99999 PR PBB SHADOW E&M-EST. PATIENT-LVL III: CPT | Mod: PBBFAC,,, | Performed by: INTERNAL MEDICINE

## 2024-10-15 PROCEDURE — 99214 OFFICE O/P EST MOD 30 MIN: CPT | Mod: S$GLB,,, | Performed by: INTERNAL MEDICINE

## 2024-10-15 RX ORDER — ESTRADIOL 0.1 MG/G
CREAM VAGINAL
COMMUNITY
Start: 2024-05-28

## 2024-10-15 NOTE — PROGRESS NOTES
"Subjective:      Patient ID: Ermelinda Verde is a 63 y.o. female.    Chief Complaint: No chief complaint on file.     HPI  Initial HPI:  Ermelinda Verdeis a 63 y.o.F with history of L sided infiltrating ductal carcinoma of the L breast (dx on Jan 2017), HTN, depression, gastritis, dermatomyositis  here to establish care for dermatomyositis. Patient was hospitalized from 1/22/19 till 2/13/19 for myositis. On 1/22/18 patient with c/o proximal muscle weakness. CPK found to be elevated around 4k.   During her hospitalization patient was started on high dose steroids and IV IG over 5 days.   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    Patient started on MTX 20mg SQ (Feb 5 2019) with folic acid. MTX discontinued 2/18 with concern of toxicity (decrease blood counts and transaminatis). Failed Cellcept - worsening leukopenia, elevated LFTs, and other side effects without improvement of symptoms   In 2020, she had 2 Rituxan infusion and had flare of skin  so it was stopped.  She has been on IVIG monthly which has controlled her weakness. Taking xeljanz - 5 mg daily.   She is IVIG 2 G/KG BW EVERY 4 WEEKS.  She reports that she thinks her skin is overall doing "fine".         Past Medical History:   Diagnosis Date    Anemia 2019    Anxiety 2018    Breast cancer 01/01/2017    left    Controlled type 2 diabetes mellitus without complication, without long-term current use of insulin 5/16/2023    Depression     Dermatomyositis     Diverticulosis     Erosive esophagitis     Gastritis     Hepatic cyst     Hypertension     Immune disorder 2019    Joint pain 2019    Keloid cicatrix 2005    Thyroiditis     Vitamin B12 deficiency 3/8/2018       Review of Systems   Constitutional:  Negative for fever and unexpected weight change.   HENT:  Negative for mouth sores and trouble swallowing.    Eyes:  Negative for redness.   Respiratory:  Negative for cough and shortness of breath.    Cardiovascular:  Negative for chest " pain.   Gastrointestinal:  Positive for constipation. Negative for diarrhea.   Genitourinary:  Negative for dysuria and genital sores.   Skin:  Negative for rash.   Neurological:  Negative for headaches.   Hematological:  Does not bruise/bleed easily.    see HPI      Objective:   LMP  (LMP Unknown)   Physical Exam   Constitutional: She is oriented to person, place, and time.   HENT:   Head: Normocephalic and atraumatic.   Right Ear: External ear normal.   Left Ear: External ear normal.   Nose: Nose normal.   Mouth/Throat: Oropharynx is clear and moist. No oropharyngeal exudate.   Eyes: Pupils are equal, round, and reactive to light. Conjunctivae are normal. Right eye exhibits no discharge. Left eye exhibits no discharge. No scleral icterus.   Neck: No JVD present. No thyromegaly present.   Cardiovascular: Normal rate, regular rhythm and normal heart sounds. Exam reveals no gallop and no friction rub.   No murmur heard.  Pulmonary/Chest: Effort normal and breath sounds normal. No respiratory distress. She has no wheezes. She has no rales. She exhibits no tenderness.   Abdominal: Soft. Bowel sounds are normal. She exhibits no distension and no mass. There is no abdominal tenderness. There is no rebound and no guarding.   Musculoskeletal:         General: No swelling, tenderness or deformity.      Cervical back: Neck supple.   Lymphadenopathy:     She has no cervical adenopathy.   Neurological: She is alert and oriented to person, place, and time. No cranial nerve deficit. Gait normal. Coordination normal.   Skin: Skin is dry. Rash noted. No erythema. No pallor.   Psychiatric: Affect and judgment normal.          No data to display     TIF1 gamma elevated at 25 (<20 units)  Anti SSA elevated at 23 (<20 units)   1/2019:   Assessment:    63 y.o.F with history of L sided infiltrating ductal carcinoma of the L breast (dx on Jan 2017), HTN, depression, gastritis, dermatomyositis  here to establish care for  "dermatomyositis.  Patient previously following with Dr. RAY    #Dermatomyositis: she initially presented with heliotrophe, facial rash, Gottron's papules) as well as a degree vasculopathic disease (nailfold vascular changes, evidence digital pulp ulcers/lesions. Skin bx consistent with DM. She initially had muscle involvement which has been well controlled on monthly Iv IG.  Per derm "    Patient appears to have 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 No need for addition of systemic steroids with controlled myopathy and otherwise absence of systemic disease due to lack of efficacy in controlling cutaneous disease, side effect profile.   Currently with evidence of both nonvasculopathic cutaneous disease (heliotrophe, facial rash, Gottron's papules) as well as a degree vasculopathic disease (nailfold vascular changes, evidence digital pulp ulcers/lesions, associated pain at these locations)."  No diagnosis found.    -continue monthly IV IG (1 G/Kg over 1 day)-regimen that worked for her per Dr. FUNEZ (Pt.lives far and prefers to done 1 day a month).  -continue Xelganz 5 mg po qday  - failed cellcept and had cytopenias  Avoiding azathioprine due to status as immediate metabolizer based on TPMT   Labs every 3 months      Immunodeficiency due to drug-   -monitor carefully for infection and any toxicities associated with immunosuppressants  -advised age appropriate cancer screenings including yearly skin exam and age appropriate vaccinations  -advised to seek immediate care in the setting of infection and hold immunosuppressive medications if there is infection      45* minutes of total time spent on the encounter, which includes face to face time and non-face to face time preparing to see the patient (eg, review of tests), Obtaining and/or reviewing separately obtained history, Documenting clinical information in the electronic or other health record, " Independently interpreting results (not separately reported) and communicating results to the patient/family/caregiver, or Care coordination (not separately reported).     Rtc in 6 months

## 2024-10-15 NOTE — PROGRESS NOTES
Subjective:       Patient ID: Ermelinda Verde is a 63 y.o. female.    Chief Complaint: Follow-up    HPI  For the last 4-5 days, congestion in the nasal area where she feels like she can't breath through her nose. No pain. Chills on Friday night. Using OTC nasal spray.  When she lays down, feels the sinus draining. Took Motrin on Saturday but maybe she felt achy. Tired from being up most of the night. Taking robitussin. Cough and productive of thick greenish sputum. Over the last 4-5 days, it's better. No one else in the household is sick.     HTN - benicar 5mg daily (ARB due to microalbuminuria in 2023), procardia xl 30mg daily (nifedipine due to vasodilation effect to help w/ pain in the fingertips from rash to hands).   Recently onboarded for digital HTN program.     HLD - crestor 10mg daily.     hyperPTH 5/2/24 76.6. Vit D wnl.  Ca ok. CKD 2/3.  Chronic mild normocytic anemia.    DM2 - last A1c 5/2/24 6.4  MAC 5/2/24 neg    Dermatomyositis. On xeljanz 5mg BID. Follows w/ Dr. FUNEZ, who has left [facility] now.   Cellcept-->worsened leukopenia and elevated LFTs w/o improving symptoms. Has appt w/ Dr. Campos to est care today.   Currently undergoing IVIG every 4 weeks.    Review of Systems   Constitutional:  Negative for activity change and unexpected weight change.   HENT:  Positive for rhinorrhea. Negative for hearing loss and trouble swallowing.    Eyes:  Negative for discharge and visual disturbance.   Respiratory:  Negative for chest tightness and wheezing.    Cardiovascular:  Positive for palpitations. Negative for chest pain.   Gastrointestinal:  Negative for blood in stool, constipation, diarrhea and vomiting.   Endocrine: Positive for polydipsia. Negative for polyuria.   Genitourinary:  Negative for difficulty urinating, dysuria, hematuria and menstrual problem.   Musculoskeletal:  Positive for arthralgias and joint swelling. Negative for neck pain.   Neurological:  Negative for weakness and headaches.  "  Psychiatric/Behavioral:  Negative for confusion and dysphoric mood.          Objective:      Physical Exam    /62   Pulse 97   Temp 97.9 °F (36.6 °C)   Ht 5' 5" (1.651 m)   Wt 83.2 kg (183 lb 5 oz)   LMP  (LMP Unknown)   SpO2 99%   BMI 30.50 kg/m²     GEN - A+OX4, NAD   HEENT - PERRL, EOMI, OP clear. MMM. TM dullness but no erythema. No sinus tenderness to palpation.   Neck - No cervical LAD.   CV - RRR, no m/r   Chest - CTAB, no wheezing or rhonchi. Normal work of breathing.  Abd - S/NT/ND/+BS.   Ext - 2+BDP and radial pulses. No LE edema. R varicose veins more prominent than L.  Neuro - 5/5 BUE and BLE strength.  MSK - no spinal tenderness to palpation. Scoliosis of the T and L spine.   Skin - taut skin on the neck and face.      Previous labs reviewed.     Assessment/Plan     Ermelinda was seen today for follow-up.    Diagnoses and all orders for this visit:    Nasal congestion - ok to cont OTC nasal spray. Start mucinex. Stay hydrated. Tylenol OTC prn pain/fevers/chills. Overall feeling better and today is about day 4 or 5 of illness. Symptomatic treatment. Will let me know if symptoms worsen. Low threshold for abx due to immunosuppression.   -     POCT COVID-19 Rapid Screening  -     POCT Influenza A/B Rapid Antigen    Benign essential hypertension - Stable and controlled. Continue current medications.  -     Comprehensive Metabolic Panel; Future    Hyperlipidemia, unspecified hyperlipidemia type - cont crestor 10mg daily.   -     Comprehensive Metabolic Panel; Future  -     Lipid Panel; Future    Hyperparathyroidism  -     PTH, intact; Future    Diabetes mellitus type 2 in obese - diet controlled.   -     Comprehensive Metabolic Panel; Future  -     Hemoglobin A1C; Future    Dermatomyositis  -     Comprehensive Metabolic Panel; Future    Immunosuppression  -     CBC Auto Differential; Future    Has appt w/ Dr. Campos this afternoon. Will wait to combine w/ her labs.     Follow up in about 3 months " (around 1/15/2025).      Reta Weeks MD  Department of Internal Medicine - [facility]  10:06 AM    Addendum: flu positive. Spoke w/ pt. Outside of tamiflu period. Symptomatic treatment as discussed. Wash hands well and wear a mask.     Reta Weeks MD  Department of Internal Medicine - [facility]  12:25 PM

## 2024-10-23 ENCOUNTER — INFUSION (OUTPATIENT)
Dept: INFECTIOUS DISEASES | Facility: HOSPITAL | Age: 64
End: 2024-10-23
Attending: INTERNAL MEDICINE
Payer: MEDICARE

## 2024-10-23 VITALS
OXYGEN SATURATION: 99 % | TEMPERATURE: 98 F | WEIGHT: 184.44 LBS | HEART RATE: 96 BPM | BODY MASS INDEX: 30.73 KG/M2 | HEIGHT: 65 IN | RESPIRATION RATE: 18 BRPM

## 2024-10-23 DIAGNOSIS — M35.9 POLYMYOSITIS ASSOCIATED WITH AUTOIMMUNE DISEASE: Primary | ICD-10-CM

## 2024-10-23 DIAGNOSIS — M33.20 POLYMYOSITIS ASSOCIATED WITH AUTOIMMUNE DISEASE: Primary | ICD-10-CM

## 2024-10-23 PROCEDURE — 96365 THER/PROPH/DIAG IV INF INIT: CPT

## 2024-10-23 PROCEDURE — 63600175 PHARM REV CODE 636 W HCPCS: Mod: JZ,JG | Performed by: INTERNAL MEDICINE

## 2024-10-23 PROCEDURE — 96366 THER/PROPH/DIAG IV INF ADDON: CPT

## 2024-10-23 RX ORDER — FAMOTIDINE 10 MG/ML
20 INJECTION INTRAVENOUS
OUTPATIENT
Start: 2024-11-20

## 2024-10-23 RX ORDER — SODIUM CHLORIDE 0.9 % (FLUSH) 0.9 %
10 SYRINGE (ML) INJECTION
Status: DISCONTINUED | OUTPATIENT
Start: 2024-10-23 | End: 2024-10-23 | Stop reason: HOSPADM

## 2024-10-23 RX ORDER — ACETAMINOPHEN 325 MG/1
650 TABLET ORAL
OUTPATIENT
Start: 2024-11-20

## 2024-10-23 RX ORDER — SODIUM CHLORIDE 0.9 % (FLUSH) 0.9 %
10 SYRINGE (ML) INJECTION
OUTPATIENT
Start: 2024-11-20

## 2024-10-23 RX ORDER — HEPARIN 100 UNIT/ML
500 SYRINGE INTRAVENOUS
Status: DISCONTINUED | OUTPATIENT
Start: 2024-10-23 | End: 2024-10-23 | Stop reason: HOSPADM

## 2024-10-23 RX ORDER — HEPARIN 100 UNIT/ML
500 SYRINGE INTRAVENOUS
OUTPATIENT
Start: 2024-11-20

## 2024-10-23 RX ORDER — DIPHENHYDRAMINE HYDROCHLORIDE 50 MG/ML
25 INJECTION INTRAMUSCULAR; INTRAVENOUS
OUTPATIENT
Start: 2024-11-20

## 2024-10-23 RX ADMIN — HUMAN IMMUNOGLOBULIN G 85 G: 40 LIQUID INTRAVENOUS at 08:10

## 2024-10-23 NOTE — PROGRESS NOTES
Patient here receiving Privigen 85 g infusion as ordered today. Pt took pre meds of Tylenol, Pepcid, and Benadryl at home prior to appt.    Privigen initiated at the following rates with titration every 30 minutes; 25 ml/hr-50 ml/hr-100 ml/hr and max rate of 201 ml/hr until infusion volume is completed.     Next appointment scheduled and future appointments made till December 2024. Patient made aware.    Limited head-to-toe assessment due to privacy issues and visit reason though the opportunity was given for patient to express any concerns.    Patient here for infusion of Privigen as ordered by Dr. Caleb Madera. All benefits, risks, requirements, and alternatives should have been discussed with patient by ordering provider at the time the order was placed.

## 2024-10-25 ENCOUNTER — HOSPITAL ENCOUNTER (OUTPATIENT)
Dept: RADIOLOGY | Facility: CLINIC | Age: 64
Discharge: HOME | End: 2024-10-25
Attending: INTERNAL MEDICINE
Payer: COMMERCIAL

## 2024-10-25 DIAGNOSIS — M85.80 OSTEOPENIA, UNSPECIFIED LOCATION: ICD-10-CM

## 2024-10-25 PROCEDURE — 77080 DXA BONE DENSITY AXIAL: CPT | Mod: TC

## 2024-10-25 PROCEDURE — 77080 DXA BONE DENSITY AXIAL: CPT | Mod: 26,,, | Performed by: INTERNAL MEDICINE

## 2024-10-30 ENCOUNTER — TELEPHONE (OUTPATIENT)
Dept: PRIMARY CARE CLINIC | Facility: CLINIC | Age: 64
End: 2024-10-30
Payer: COMMERCIAL

## 2024-11-11 ENCOUNTER — HOSPITAL ENCOUNTER (OUTPATIENT)
Dept: RADIOLOGY | Facility: HOSPITAL | Age: 64
Discharge: HOME | End: 2024-11-11
Attending: INTERNAL MEDICINE
Payer: COMMERCIAL

## 2024-11-11 DIAGNOSIS — K86.9 PANCREATIC LESION: ICD-10-CM

## 2024-11-11 PROCEDURE — A9698 NON-RAD CONTRAST MATERIALNOC: HCPCS | Performed by: INTERNAL MEDICINE

## 2024-11-11 PROCEDURE — 74176 CT ABD & PELVIS W/O CONTRAST: CPT | Mod: 26,,, | Performed by: INTERNAL MEDICINE

## 2024-11-11 PROCEDURE — 25500020 PHARM REV CODE 255: Performed by: INTERNAL MEDICINE

## 2024-11-11 PROCEDURE — 74176 CT ABD & PELVIS W/O CONTRAST: CPT | Mod: TC

## 2024-11-11 RX ADMIN — BARIUM SULFATE 450 ML: 20 SUSPENSION ORAL at 03:11

## 2024-11-18 ENCOUNTER — PATIENT MESSAGE (OUTPATIENT)
Dept: ADMINISTRATIVE | Facility: HOSPITAL | Age: 64
End: 2024-11-18
Payer: COMMERCIAL

## 2024-11-25 ENCOUNTER — INFUSION (OUTPATIENT)
Dept: INFECTIOUS DISEASES | Facility: HOSPITAL | Age: 64
End: 2024-11-25
Attending: INTERNAL MEDICINE
Payer: MEDICARE

## 2024-11-25 VITALS
DIASTOLIC BLOOD PRESSURE: 81 MMHG | HEIGHT: 65 IN | WEIGHT: 180.69 LBS | RESPIRATION RATE: 18 BRPM | HEART RATE: 82 BPM | OXYGEN SATURATION: 100 % | TEMPERATURE: 98 F | BODY MASS INDEX: 30.1 KG/M2 | SYSTOLIC BLOOD PRESSURE: 145 MMHG

## 2024-11-25 DIAGNOSIS — M35.9 POLYMYOSITIS ASSOCIATED WITH AUTOIMMUNE DISEASE: Primary | ICD-10-CM

## 2024-11-25 DIAGNOSIS — M33.20 POLYMYOSITIS ASSOCIATED WITH AUTOIMMUNE DISEASE: Primary | ICD-10-CM

## 2024-11-25 PROCEDURE — 63600175 PHARM REV CODE 636 W HCPCS: Mod: JZ,JG | Performed by: INTERNAL MEDICINE

## 2024-11-25 PROCEDURE — 96365 THER/PROPH/DIAG IV INF INIT: CPT

## 2024-11-25 PROCEDURE — 96366 THER/PROPH/DIAG IV INF ADDON: CPT

## 2024-11-25 RX ORDER — SODIUM CHLORIDE 0.9 % (FLUSH) 0.9 %
10 SYRINGE (ML) INJECTION
OUTPATIENT
Start: 2024-12-16

## 2024-11-25 RX ORDER — DIPHENHYDRAMINE HYDROCHLORIDE 50 MG/ML
25 INJECTION INTRAMUSCULAR; INTRAVENOUS
OUTPATIENT
Start: 2024-12-16

## 2024-11-25 RX ORDER — HEPARIN 100 UNIT/ML
500 SYRINGE INTRAVENOUS
OUTPATIENT
Start: 2024-12-16

## 2024-11-25 RX ORDER — ACETAMINOPHEN 325 MG/1
650 TABLET ORAL
OUTPATIENT
Start: 2024-12-16

## 2024-11-25 RX ORDER — HEPARIN 100 UNIT/ML
500 SYRINGE INTRAVENOUS
Status: DISCONTINUED | OUTPATIENT
Start: 2024-11-25 | End: 2024-11-25 | Stop reason: HOSPADM

## 2024-11-25 RX ORDER — SODIUM CHLORIDE 0.9 % (FLUSH) 0.9 %
10 SYRINGE (ML) INJECTION
Status: DISCONTINUED | OUTPATIENT
Start: 2024-11-25 | End: 2024-11-25 | Stop reason: HOSPADM

## 2024-11-25 RX ORDER — FAMOTIDINE 10 MG/ML
20 INJECTION INTRAVENOUS
OUTPATIENT
Start: 2024-12-16

## 2024-11-25 RX ADMIN — HUMAN IMMUNOGLOBULIN G 80 G: 40 LIQUID INTRAVENOUS at 09:11

## 2024-11-25 NOTE — PROGRESS NOTES
Patient here receiving Privigen 80 g infusion as ordered today. Pt took pre meds of Tylenol, Pepcid, and Benadryl at home prior to appt.    Privigen initiated at the following rates with titration every 30 minutes; 25 ml/hr-49 ml/hr-98 ml/hr and max rate of 197 ml/hr until infusion volume is completed.     Next appointment scheduled and future appointments made through February 2025. Patient made aware.    Limited head-to-toe assessment due to privacy issues and visit reason though the opportunity was given for patient to express any concerns.    Patient here for infusion of Privigen as ordered by Dr. Caleb Madera. All benefits, risks, requirements, and alternatives should have been discussed with patient by ordering provider at the time the order was placed.

## 2024-12-13 ENCOUNTER — PATIENT MESSAGE (OUTPATIENT)
Dept: PRIMARY CARE CLINIC | Facility: CLINIC | Age: 64
End: 2024-12-13
Payer: COMMERCIAL

## 2024-12-16 ENCOUNTER — IMMUNIZATION (OUTPATIENT)
Dept: INTERNAL MEDICINE | Facility: CLINIC | Age: 64
End: 2024-12-16
Payer: COMMERCIAL

## 2024-12-16 ENCOUNTER — HOSPITAL ENCOUNTER (OUTPATIENT)
Dept: RADIOLOGY | Facility: HOSPITAL | Age: 64
Discharge: HOME | End: 2024-12-16
Attending: INTERNAL MEDICINE
Payer: COMMERCIAL

## 2024-12-16 ENCOUNTER — OFFICE VISIT (OUTPATIENT)
Dept: HEMATOLOGY/ONCOLOGY | Facility: CLINIC | Age: 64
End: 2024-12-16
Payer: COMMERCIAL

## 2024-12-16 VITALS
WEIGHT: 183.44 LBS | SYSTOLIC BLOOD PRESSURE: 166 MMHG | HEIGHT: 65 IN | DIASTOLIC BLOOD PRESSURE: 92 MMHG | RESPIRATION RATE: 17 BRPM | OXYGEN SATURATION: 100 % | BODY MASS INDEX: 30.56 KG/M2 | TEMPERATURE: 98 F | HEART RATE: 96 BPM

## 2024-12-16 DIAGNOSIS — M33.20 POLYMYOSITIS ASSOCIATED WITH AUTOIMMUNE DISEASE: ICD-10-CM

## 2024-12-16 DIAGNOSIS — Z85.3 HISTORY OF BREAST CANCER IN FEMALE: Primary | ICD-10-CM

## 2024-12-16 DIAGNOSIS — T45.1X5A CHEMOTHERAPY-INDUCED NEUROPATHY: ICD-10-CM

## 2024-12-16 DIAGNOSIS — G62.0 CHEMOTHERAPY-INDUCED NEUROPATHY: ICD-10-CM

## 2024-12-16 DIAGNOSIS — Z85.3 HISTORY OF BREAST CANCER IN FEMALE: ICD-10-CM

## 2024-12-16 DIAGNOSIS — M35.9 POLYMYOSITIS ASSOCIATED WITH AUTOIMMUNE DISEASE: ICD-10-CM

## 2024-12-16 DIAGNOSIS — Z23 NEED FOR VACCINATION: Primary | ICD-10-CM

## 2024-12-16 PROCEDURE — 77063 BREAST TOMOSYNTHESIS BI: CPT | Mod: TC,52

## 2024-12-16 PROCEDURE — 77067 SCR MAMMO BI INCL CAD: CPT | Mod: 26,52,, | Performed by: RADIOLOGY

## 2024-12-16 PROCEDURE — 90480 ADMN SARSCOV2 VAC 1/ONLY CMP: CPT | Mod: S$GLB,,, | Performed by: INTERNAL MEDICINE

## 2024-12-16 PROCEDURE — 99999 PR PBB SHADOW E&M-EST. PATIENT-LVL IV: CPT | Mod: PBBFAC,,, | Performed by: INTERNAL MEDICINE

## 2024-12-16 PROCEDURE — 77063 BREAST TOMOSYNTHESIS BI: CPT | Mod: 26,52,, | Performed by: RADIOLOGY

## 2024-12-16 PROCEDURE — 91320 SARSCV2 VAC 30MCG TRS-SUC IM: CPT | Mod: S$GLB,,, | Performed by: INTERNAL MEDICINE

## 2024-12-16 NOTE — PROGRESS NOTES
Subjective:       Patient ID: Ermelinda Verde is a 64 y.o. female.    Chief Complaint: No chief complaint on file.      HPI Mrs Verde is a 64-year-old -American female who returns for follow-up of a history of recurrent triple negative left breast cancer - S/P chemo-immunotherapy.      Her course was complicated by the development of auto-immune dermatomyositis.She has been on IVIG every 8 weeks and daily tofacitinib.       For the last 2 weeks she has had a flare of her symptoms with increased pain in her hands and knees.  She has also had some increased redness of the skin on her face.  She is working on getting an appointment with Rheumatology.  Her breathing has been normal.  Appetite and function are stable.         Onc History:  She developed a palpable abnormality in her left breast in January 2017 which she noted on self-examination.  A diagnostic mammogram on January 19 showed a greater than 1 cm nodule in the upper outer portion of left breast.  By ultrasound this was lobulated and hypoechoic measuring 1.75 x 1.51 x 1.96 cm.     On January 24, 2017 a core needle biopsy was performed which showed infiltrating ductal carcinoma, high grade.  The tumor was ER negative, TX negative, and HER-2 negative.  A follow-up ultrasound on December 6 showed 2.5 x 2.2 x 1.5 cm left breast mass.  There was no abnormality noted in the left axilla.     She underwent sentinel lymph node biopsy on February 22.  That showed 4 negative lymph nodes.     She had 4 cycles of  Wilbur-adjuvantTaxotere and Cytoxan completed  on 5/9/17.     On June 26 she underwent left mastectomy.  That revealed 2 foci of invasive high-grade carcinoma measuring 14 mm and 1.5 mm.  Margins were negative.    She completed 4 cycles of adjuvant Adriamycin on September 12, 2017.      In October 2018, she developed some left supraclavicular lymphadenopathy which turned out to be recurrence.     A fine-needle aspirate of the lymph node was performed  on October 19th.  That showed metastatic carcinoma consistent with breast primary which was ER negative, CT negative and HER 2-negative.    She then received 3 cycles of Nab paclitaxel and atezolizumab.  She last received treatment on January 3, 2019.    PET-CT in March, 2019 showed complete response.    She then had consolidative radiation therapy in May 2019.    Her treatment was complicated by the development of dermatomyositis which resulted in the lengthy hospitalization from January 22nd to February 13th, 2019.  She is followed by Rheumatology and has been treated with steroids, IVIG, and low-dose methotrexate, and Cell-cept.  She received rituximab therapy 4/13/2- and 4/27/20.    CT Abd/Pelvis in May 2023 - [facility]    Mammogram in December 2023- negative  Review of Systems   Constitutional:  Negative for appetite change and unexpected weight change.   HENT:  Negative for congestion, mouth sores and sinus pressure.    Eyes:  Negative for visual disturbance.   Respiratory:  Negative for cough and shortness of breath.    Cardiovascular:  Negative for chest pain.   Gastrointestinal:  Negative for abdominal pain, constipation and diarrhea.   Genitourinary:  Negative for frequency.   Musculoskeletal:  Positive for arthralgias (hands and knees). Negative for back pain.   Skin:  Positive for rash (facial).   Neurological:  Negative for weakness and headaches.   Hematological:  Negative for adenopathy.   Psychiatric/Behavioral:  The patient is not nervous/anxious.        Objective:      Physical Exam  Vitals reviewed.   Constitutional:       General: She is not in acute distress.     Appearance: She is well-developed.   Eyes:      General: No scleral icterus.  Cardiovascular:      Rate and Rhythm: Normal rate and regular rhythm.   Pulmonary:      Effort: Pulmonary effort is normal.      Breath sounds: Normal breath sounds. No wheezing or rales.   Chest:   Breasts:     Right: Normal. No mass, nipple discharge or skin  change.       Abdominal:      Palpations: Abdomen is soft. There is no mass.      Tenderness: There is no abdominal tenderness.   Lymphadenopathy:      Cervical: No cervical adenopathy.      Upper Body:      Right upper body: No supraclavicular or axillary adenopathy.      Left upper body: No supraclavicular or axillary adenopathy.   Skin:     Findings: Rash (erythematous on face) present.      Comments: tigtness of skin in chest /neck with post radiation change, left chest wall   Neurological:      Mental Status: She is alert and oriented to person, place, and time.   Psychiatric:         Behavior: Behavior normal.         Thought Content: Thought content normal.         Assessment:     Mammogram - negative  1. History of breast cancer in female    2. Polymyositis associated with autoimmune disease    3. Chemotherapy-induced neuropathy        Plan:     RTC 6 M          Route Chart for Scheduling  Med Onc Route Chart for Scheduling

## 2024-12-23 ENCOUNTER — INFUSION (OUTPATIENT)
Dept: INFECTIOUS DISEASES | Facility: HOSPITAL | Age: 64
End: 2024-12-23
Attending: INTERNAL MEDICINE
Payer: MEDICARE

## 2024-12-23 VITALS
BODY MASS INDEX: 30.39 KG/M2 | RESPIRATION RATE: 20 BRPM | SYSTOLIC BLOOD PRESSURE: 120 MMHG | DIASTOLIC BLOOD PRESSURE: 71 MMHG | OXYGEN SATURATION: 100 % | TEMPERATURE: 97 F | HEIGHT: 65 IN | HEART RATE: 77 BPM | WEIGHT: 182.44 LBS

## 2024-12-23 DIAGNOSIS — M33.20 POLYMYOSITIS ASSOCIATED WITH AUTOIMMUNE DISEASE: Primary | ICD-10-CM

## 2024-12-23 DIAGNOSIS — M35.9 POLYMYOSITIS ASSOCIATED WITH AUTOIMMUNE DISEASE: Primary | ICD-10-CM

## 2024-12-23 PROCEDURE — 63600175 PHARM REV CODE 636 W HCPCS: Mod: JZ,JG | Performed by: INTERNAL MEDICINE

## 2024-12-23 PROCEDURE — 96366 THER/PROPH/DIAG IV INF ADDON: CPT

## 2024-12-23 PROCEDURE — 96365 THER/PROPH/DIAG IV INF INIT: CPT

## 2024-12-23 RX ORDER — SODIUM CHLORIDE 0.9 % (FLUSH) 0.9 %
10 SYRINGE (ML) INJECTION
OUTPATIENT
Start: 2025-01-13

## 2024-12-23 RX ORDER — ACETAMINOPHEN 325 MG/1
650 TABLET ORAL
OUTPATIENT
Start: 2025-01-13

## 2024-12-23 RX ORDER — FAMOTIDINE 10 MG/ML
20 INJECTION INTRAVENOUS
OUTPATIENT
Start: 2025-01-13

## 2024-12-23 RX ORDER — HEPARIN 100 UNIT/ML
500 SYRINGE INTRAVENOUS
OUTPATIENT
Start: 2025-01-13

## 2024-12-23 RX ORDER — SODIUM CHLORIDE 0.9 % (FLUSH) 0.9 %
10 SYRINGE (ML) INJECTION
Status: DISCONTINUED | OUTPATIENT
Start: 2024-12-23 | End: 2024-12-23 | Stop reason: HOSPADM

## 2024-12-23 RX ORDER — DIPHENHYDRAMINE HYDROCHLORIDE 50 MG/ML
25 INJECTION INTRAMUSCULAR; INTRAVENOUS
OUTPATIENT
Start: 2025-01-13

## 2024-12-23 RX ADMIN — HUMAN IMMUNOGLOBULIN G 85 G: 40 LIQUID INTRAVENOUS at 08:12

## 2024-12-23 NOTE — PROGRESS NOTES
Patient arrived to infusion suite for Privigen 85 g infusion. Patient took pre medications of Tylenol 650 mg PO, Pepcid 20 mg PO, and Benadryl 25 mg PO 30 minutes prior to arrival.    Privigen initiated and titrated as ordered with the following rates every 30 minutes: 25 ml/hr, 50 ml/hr, 99 ml/hr and max rate of 199 ml/hr until infusion volume completed. V/S monitored with each titration rate changes. Patient tolerated well.     Next appointment scheduled and patient made aware.     Limited head-to-toe assessment due to privacy issues and visit reason though the opportunity was given for patient to express any concerns

## 2024-12-26 ENCOUNTER — PATIENT MESSAGE (OUTPATIENT)
Dept: PRIMARY CARE CLINIC | Facility: CLINIC | Age: 64
End: 2024-12-26
Payer: COMMERCIAL

## 2025-01-13 ENCOUNTER — OFFICE VISIT (OUTPATIENT)
Dept: URGENT CARE | Facility: CLINIC | Age: 65
End: 2025-01-13
Payer: MEDICARE

## 2025-01-13 VITALS
BODY MASS INDEX: 30.32 KG/M2 | SYSTOLIC BLOOD PRESSURE: 145 MMHG | TEMPERATURE: 98 F | RESPIRATION RATE: 17 BRPM | HEART RATE: 87 BPM | HEIGHT: 65 IN | WEIGHT: 182 LBS | OXYGEN SATURATION: 99 % | DIASTOLIC BLOOD PRESSURE: 82 MMHG

## 2025-01-13 DIAGNOSIS — J01.40 ACUTE NON-RECURRENT PANSINUSITIS: ICD-10-CM

## 2025-01-13 DIAGNOSIS — R05.9 COUGH, UNSPECIFIED TYPE: Primary | ICD-10-CM

## 2025-01-13 DIAGNOSIS — J02.9 ACUTE PHARYNGITIS, UNSPECIFIED ETIOLOGY: ICD-10-CM

## 2025-01-13 LAB
CTP QC/QA: YES
POC MOLECULAR INFLUENZA A AGN: NEGATIVE
POC MOLECULAR INFLUENZA B AGN: NEGATIVE

## 2025-01-13 PROCEDURE — 87502 INFLUENZA DNA AMP PROBE: CPT | Mod: QW,S$GLB,, | Performed by: FAMILY MEDICINE

## 2025-01-13 PROCEDURE — 99214 OFFICE O/P EST MOD 30 MIN: CPT | Mod: S$GLB,,, | Performed by: FAMILY MEDICINE

## 2025-01-13 RX ORDER — CEFDINIR 300 MG/1
300 CAPSULE ORAL 2 TIMES DAILY
Qty: 20 CAPSULE | Refills: 0 | Status: SHIPPED | OUTPATIENT
Start: 2025-01-13 | End: 2025-01-29

## 2025-01-13 RX ORDER — NAPROXEN 500 MG/1
500 TABLET ORAL 2 TIMES DAILY WITH MEALS
Qty: 20 TABLET | Refills: 0 | Status: SHIPPED | OUTPATIENT
Start: 2025-01-13

## 2025-01-13 RX ORDER — DEXTROMETHORPHAN HBR, GUAIFENESIN AND PSEUDOEPHEDRINE HCL 60; 380; 20 MG/1; MG/1; MG/1
1 TABLET ORAL EVERY 6 HOURS PRN
Qty: 20 TABLET | Refills: 0 | Status: SHIPPED | OUTPATIENT
Start: 2025-01-13

## 2025-01-13 NOTE — PROGRESS NOTES
"Subjective:      Patient ID: Ermelinda Verde is a 64 y.o. female.    Vitals:  height is 5' 5" (1.651 m) and weight is 82.6 kg (182 lb). Her oral temperature is 98.3 °F (36.8 °C). Her blood pressure is 145/82 (abnormal) and her pulse is 87. Her respiration is 17 and oxygen saturation is 99%.     Chief Complaint: Sinus Problem    Sinus Problem  This is a new problem. The current episode started yesterday. The problem has been gradually worsening since onset. There has been no fever. Her pain is at a severity of 1/10. The pain is mild. Associated symptoms include congestion, coughing, sinus pressure and sneezing. Pertinent negatives include no headaches or sore throat. Treatments tried: gargled, mucinex.       Constitution: Negative.   HENT:  Positive for congestion and sinus pressure. Negative for sore throat.    Cardiovascular: Negative.    Eyes: Negative.    Respiratory:  Positive for cough.    Gastrointestinal: Negative.    Endocrine: negative.   Genitourinary: Negative.    Musculoskeletal: Negative.    Skin: Negative.    Allergic/Immunologic: Negative.  Positive for sneezing.   Neurological: Negative.  Negative for headaches.   Hematologic/Lymphatic: Negative.    Psychiatric/Behavioral: Negative.        Objective:     Physical Exam   Constitutional: She is oriented to person, place, and time. She appears well-developed. She is cooperative.  Non-toxic appearance. She does not appear ill. No distress.   HENT:   Head: Normocephalic and atraumatic.   Ears:   Right Ear: Hearing, tympanic membrane, external ear and ear canal normal.   Left Ear: Hearing, tympanic membrane, external ear and ear canal normal.   Nose: No mucosal edema, rhinorrhea or nasal deformity. No epistaxis. Right sinus exhibits maxillary sinus tenderness and frontal sinus tenderness. Left sinus exhibits maxillary sinus tenderness and frontal sinus tenderness.   Mouth/Throat: Uvula is midline and mucous membranes are normal. No trismus in the jaw. " Normal dentition. No uvula swelling. Posterior oropharyngeal edema and posterior oropharyngeal erythema present. No oropharyngeal exudate.   Eyes: Conjunctivae and lids are normal. No scleral icterus.   Neck: Trachea normal and phonation normal. Neck supple. No edema present. No erythema present. No neck rigidity present.   Cardiovascular: Normal rate, regular rhythm, normal heart sounds and normal pulses.   Pulmonary/Chest: Effort normal and breath sounds normal. No respiratory distress. She has no decreased breath sounds. She has no rhonchi.   Abdominal: Normal appearance.   Musculoskeletal: Normal range of motion.         General: No deformity or edema. Normal range of motion.   Neurological: She is alert and oriented to person, place, and time. She exhibits normal muscle tone. Coordination normal.   Skin: Skin is warm, dry, intact, not diaphoretic and not pale.   Psychiatric: Her speech is normal and behavior is normal. Judgment and thought content normal.   Nursing note and vitals reviewed.    Results for orders placed or performed in visit on 01/13/25   POCT Influenza A/B MOLECULAR    Collection Time: 01/13/25  3:08 PM   Result Value Ref Range    POC Molecular Influenza A Ag Negative Negative    POC Molecular Influenza B Ag Negative Negative     Acceptable Yes      *Note: Due to a large number of results and/or encounters for the requested time period, some results have not been displayed. A complete set of results can be found in Results Review.         Assessment:     1. Cough, unspecified type    2. Acute pharyngitis, unspecified etiology    3. Acute non-recurrent pansinusitis        Plan:       Cough, unspecified type  -     POCT Influenza A/B MOLECULAR    Acute pharyngitis, unspecified etiology    Acute non-recurrent pansinusitis  -     cefdinir (OMNICEF) 300 MG capsule; Take 1 capsule (300 mg total) by mouth 2 (two) times daily. for 10 days  Dispense: 20 capsule; Refill: 0  -      pseudoephedrine-DM-guaiFENesin (POLY-VENT DM) 60- mg Tab; Take 1 tablet by mouth every 6 (six) hours as needed.  Dispense: 20 tablet; Refill: 0  -     naproxen (NAPROSYN) 500 MG tablet; Take 1 tablet (500 mg total) by mouth 2 (two) times daily with meals.  Dispense: 20 tablet; Refill: 0      Please drink plenty of fluids.  Please get plenty of rest.  Please return here or go to the Emergency Department for any concerns or worsening of condition.  If you were given wait & see antibiotics, please wait 3-5 days before taking them, and only take them if your symptoms have worsened or not improved.  If you do begin taking the antibiotics, please take them to completion.  If you were prescribed antibiotics, please take them to completion.  If you were prescribed a narcotic medication, do not drive or operate heavy equipment or machinery while taking these medications.    You were given a decongestant (RESCON or POLY VENT Dm).  If your insurance does not cover it or you cannot afford it, it is ok to use the over the counter products listed below.  If you do not have Hypertension or any history of palpitations, it is ok to take over the counter Sudafed or Mucinex D or Allegra-D or Claritin-D or Zyrtec-D.  If you do take one of the above, it is ok to combine that with plain over the counter Mucinex or Allegra or Claritin or Zyrtec.  If for example you are taking Zyrtec -D, you can combine that with Mucinex, but not Mucinex-D.  If you are taking Mucinex-D, you can combine that with plain Allegra or Claritin or Zyrtec.   If you do have Hypertension or palpitations, it is safe to take Coricidin HBP for relief of sinus symptoms.    We recommend you take over the counter Flonase (Fluticasone) or another nasally inhaled steroid unless you are already taking one.  Nasal irrigation with a saline spray or Netti Pot like device per their directions is also recommended.  If not allergic, please take over the counter Tylenol  (Acetaminophen) and/or Motrin (Ibuprofen) as directed for control of pain and/or fever.    Robitussin DM 2 teas every 4 hours as needed for cough.  If you  smoke, please stop smoking.    Please follow up with your primary care doctor or specialist as needed.  Reta Weeks MD  [phone]    You must understand that you have received treatment at an Urgent Care facility only, and that you may be  released before all of your medical problems are known or treated. Urgent Care facilities are not equipped to  handle life threatening emergencies. It is recommended that you seek care at an Emergency Department for  further evaluation of worsening or concerning symptoms, or possibly life threatening conditions as  discussed.

## 2025-01-13 NOTE — PATIENT INSTRUCTIONS
Please drink plenty of fluids.  Please get plenty of rest.  Please return here or go to the Emergency Department for any concerns or worsening of condition.  If you were given wait & see antibiotics, please wait 3-5 days before taking them, and only take them if your symptoms have worsened or not improved.  If you do begin taking the antibiotics, please take them to completion.  If you were prescribed antibiotics, please take them to completion.  If you were prescribed a narcotic medication, do not drive or operate heavy equipment or machinery while taking these medications.    You were given a decongestant (RESCON or POLY VENT Dm).  If your insurance does not cover it or you cannot afford it, it is ok to use the over the counter products listed below.  If you do not have Hypertension or any history of palpitations, it is ok to take over the counter Sudafed or Mucinex D or Allegra-D or Claritin-D or Zyrtec-D.  If you do take one of the above, it is ok to combine that with plain over the counter Mucinex or Allegra or Claritin or Zyrtec.  If for example you are taking Zyrtec -D, you can combine that with Mucinex, but not Mucinex-D.  If you are taking Mucinex-D, you can combine that with plain Allegra or Claritin or Zyrtec.   If you do have Hypertension or palpitations, it is safe to take Coricidin HBP for relief of sinus symptoms.    We recommend you take over the counter Flonase (Fluticasone) or another nasally inhaled steroid unless you are already taking one.  Nasal irrigation with a saline spray or Netti Pot like device per their directions is also recommended.  If not allergic, please take over the counter Tylenol (Acetaminophen) and/or Motrin (Ibuprofen) as directed for control of pain and/or fever.    Robitussin DM 2 teas every 4 hours as needed for cough.  If you  smoke, please stop smoking.    Please follow up with your primary care doctor or specialist as needed.  Reta Weeks MD  [phone]    You must  understand that you have received treatment at an Urgent Care facility only, and that you may be  released before all of your medical problems are known or treated. Urgent Care facilities are not equipped to  handle life threatening emergencies. It is recommended that you seek care at an Emergency Department for  further evaluation of worsening or concerning symptoms, or possibly life threatening conditions as  discussed.    Pharyngitis: Strep (Presumed)    You have pharyngitis (sore throat). The cause is thought to be the streptococcus, or strep, bacterium. Strep throat infection can cause throat pain that is worse when swallowing, aching all over, headache, and fever. The infection may be spread by coughing, kissing, or touching others after touching your mouth or nose. Antibiotic medications are given to treat the infection.  Home care  Rest at home. Drink plenty of fluids to avoid dehydration.  No work or school for the first 2 days of taking the antibiotics. After this time, you will not be contagious. You can then return to work or school if you are feeling better.   The antibiotic medication must be taken for the full 10 days, even if you feel better. This is very important to ensure the infection is treated. It is also important to prevent drug-resistant organisms from developing. If you were given an antibiotic shot, no more antibiotics are needed.  You may use acetaminophen or ibuprofen to control pain or fever, unless another medicine was prescribed for this. If you have chronic liver or kidney disease or ever had a stomach ulcer or GI bleeding, talk with your doctor before using these medicines.  Throat lozenges or a throat-numbing sprays can help reduce throat pain. Gargling with warm salt water can also help. Dissolve 1/2 teaspoon of salt in 1 8 ounce glass of warm water.   Avoid salty or spicy foods, which can irritate the throat.  Follow-up care  Follow up with your healthcare provider or our staff  if you are not improving over the next week.  When to seek medical advice  Call your healthcare provider right away if any of these occur:  Fever as directed by your doctor.   New or worsening ear pain, sinus pain, or headache  Painful lumps in the back of neck  Stiff neck  Lymph nodes are getting larger  Inability to swallow liquids, excessive drooling, or inability to open mouth wide due to throat pain  Signs of dehydration (very dark urine or no urine, sunken eyes, dizziness)  Trouble breathing or noisy breathing  Muffled voice  New rash  Date Last Reviewed: 4/13/2015 © 2000-2016 [employer]. [address] [place] [address]. All rights reserved. This information is not intended as a substitute for professional medical care. Always follow your healthcare professional's instructions.      Acute Bacterial Rhinosinusitis (ABRS)  Acute bacterial rhinosinusitis (ABRS) is an infection of your nasal cavity and sinuses. Its caused by bacteria. Acute means that youve had symptoms for less than 12 weeks.  Understanding your sinuses  The nasal cavity is the large air-filled space behind your nose. The sinuses are a group of spaces formed by the bones of your face. They connect with your nasal cavity. ABRS causes the tissue lining these spaces to become inflamed. Mucus may not drain normally. This leads to facial pain and other symptoms.  What causes ABRS?  ABRS most often follows an upper respiratory infection caused by a virus. Bacteria then infect the lining of your nasal cavity and sinuses. But you can also get ABRS if you have:  Nasal allergies  Long-term nasal swelling and congestion not caused by allergies  Blockage in the nose  Symptoms of ABRS  The symptoms of ABRS may be different for each person, and can include:  Nasal congestion  Runny nose  Fluid draining from the nose down the throat (postnasal drip)  Headache  Cough  Pain in the sinuses  Thick, colored fluid from the nose  (mucus)  Fever  Diagnosing ABRS  ABRS may be diagnosed if youve had an upper respiratory infection like a cold and cough for longer than 10 to 14 days. Your health care provider will ask about your symptoms and your medical history. The provider will check your vital signs, including your temperature. Youll have a physical exam. The health care provider will check your ears, nose, and throat. You likely wont need any tests. If ABRS comes back, you may have a culture or other tests.  Treatment for ABRS  Treatment may include:  Antibiotic medicine. This is for symptoms that last for at least 10 to 14 days.  Nasal corticosteroid medicine. Drops or spray used in the nose can lessen swelling and congestion.  Over-the-counter pain medicine. This is to lessen sinus pain and pressure.  Nasal decongestant medicine. Spray or drops may help to lessen congestion. Do not use them for more than a few days.  Salt wash (saline irrigation). This can help to loosen mucus.  Possible complications of ABRS  ABRS may come back or become long-term (chronic).  In rare cases, ABRS may cause complications such as:   Inflamed tissue around the brain and spinal cord (meningitis)  Inflamed tissue around the eyes (orbital cellulitis)  Inflamed bones around the sinuses (osteitis)  These problems may need to be treated in a hospital with intravenous (IV) antibiotic medicine or surgery.  When to call the health care provider  Call your health care provider if you have any of the following:  Symptoms that dont get better, or get worse  Symptoms that dont get better after 3 to 5 days on antibiotics  Trouble seeing  Swelling around your eyes  Confusion or trouble staying awake   Date Last Reviewed: 3/3/2015  © 0206-0582 [employer]. [address] [place] [address], [place] [address]. All rights reserved. This information is not intended as a substitute for professional medical care. Always follow your healthcare professional's  instructions.          unknown

## 2025-01-13 NOTE — LETTER
January 13, 2025  Ermelinda Verde  [address]  [place] [address]                [facility] [place]  [address], [address]  [place] [address]  Phone: [phone]  Fax: [phone] Ermelinda Verde was seen and treated in our Urgent Care department on 1/13/2025. She may return to work in 2 - 3 days.      If you have any questions or concerns, please don't hesitate to call.        Sincerely,        Fernando Le MD

## 2025-01-27 ENCOUNTER — INFUSION (OUTPATIENT)
Dept: INFECTIOUS DISEASES | Facility: HOSPITAL | Age: 65
End: 2025-01-27
Attending: INTERNAL MEDICINE
Payer: COMMERCIAL

## 2025-01-27 VITALS
SYSTOLIC BLOOD PRESSURE: 122 MMHG | HEART RATE: 93 BPM | TEMPERATURE: 98 F | DIASTOLIC BLOOD PRESSURE: 68 MMHG | WEIGHT: 184.75 LBS | HEIGHT: 65 IN | OXYGEN SATURATION: 100 % | BODY MASS INDEX: 30.78 KG/M2 | RESPIRATION RATE: 20 BRPM

## 2025-01-27 DIAGNOSIS — M33.20 POLYMYOSITIS ASSOCIATED WITH AUTOIMMUNE DISEASE: Primary | ICD-10-CM

## 2025-01-27 DIAGNOSIS — M35.9 POLYMYOSITIS ASSOCIATED WITH AUTOIMMUNE DISEASE: Primary | ICD-10-CM

## 2025-01-27 PROCEDURE — 96366 THER/PROPH/DIAG IV INF ADDON: CPT

## 2025-01-27 PROCEDURE — 96365 THER/PROPH/DIAG IV INF INIT: CPT

## 2025-01-27 PROCEDURE — 63600175 PHARM REV CODE 636 W HCPCS: Mod: JZ,TB | Performed by: INTERNAL MEDICINE

## 2025-01-27 RX ORDER — SODIUM CHLORIDE 0.9 % (FLUSH) 0.9 %
10 SYRINGE (ML) INJECTION
OUTPATIENT
Start: 2025-02-10

## 2025-01-27 RX ORDER — FAMOTIDINE 10 MG/ML
20 INJECTION INTRAVENOUS
OUTPATIENT
Start: 2025-02-10

## 2025-01-27 RX ORDER — ACETAMINOPHEN 325 MG/1
650 TABLET ORAL
OUTPATIENT
Start: 2025-02-10

## 2025-01-27 RX ORDER — DIPHENHYDRAMINE HYDROCHLORIDE 50 MG/ML
25 INJECTION INTRAMUSCULAR; INTRAVENOUS
OUTPATIENT
Start: 2025-02-10

## 2025-01-27 RX ORDER — HEPARIN 100 UNIT/ML
500 SYRINGE INTRAVENOUS
OUTPATIENT
Start: 2025-02-10

## 2025-01-27 RX ADMIN — HUMAN IMMUNOGLOBULIN G 85 G: 40 LIQUID INTRAVENOUS at 08:01

## 2025-01-27 NOTE — PROGRESS NOTES
Patient arrived to infusion suite for Privigen 85 g infusion. Patient took pre medications of Tylenol 650 mg PO, Pepcid 20 mg PO, and Benadryl 25 mg PO 30 minutes prior to arrival.    Privigen initiated and titrated as ordered with the following rates every 30 minutes: 25 ml/hr, 50 ml/hr, 100 ml/hr and max rate of 201 ml/hr until infusion volume completed. V/S monitored with each titration rate changes. Patient tolerated well.     Dr. Hickey gave approval for patient to receive next dose (2/20/25) four days early. This will allow the patient to get back on every 4 week schedule. Next appointment scheduled and patient made aware.     Limited head-to-toe assessment due to privacy issues and visit reason though the opportunity was given for patient to express any concerns

## 2025-01-29 ENCOUNTER — OFFICE VISIT (OUTPATIENT)
Dept: PRIMARY CARE CLINIC | Facility: CLINIC | Age: 65
End: 2025-01-29
Payer: MEDICARE

## 2025-01-29 VITALS
WEIGHT: 185.19 LBS | BODY MASS INDEX: 30.85 KG/M2 | TEMPERATURE: 98 F | HEIGHT: 65 IN | SYSTOLIC BLOOD PRESSURE: 122 MMHG | HEART RATE: 89 BPM | OXYGEN SATURATION: 98 % | RESPIRATION RATE: 16 BRPM | DIASTOLIC BLOOD PRESSURE: 70 MMHG

## 2025-01-29 DIAGNOSIS — D84.9 IMMUNOSUPPRESSION: ICD-10-CM

## 2025-01-29 DIAGNOSIS — E78.5 HYPERLIPIDEMIA ASSOCIATED WITH TYPE 2 DIABETES MELLITUS: ICD-10-CM

## 2025-01-29 DIAGNOSIS — E11.69 DIABETES MELLITUS TYPE 2 IN OBESE: ICD-10-CM

## 2025-01-29 DIAGNOSIS — E66.9 DIABETES MELLITUS TYPE 2 IN OBESE: ICD-10-CM

## 2025-01-29 DIAGNOSIS — I10 BENIGN ESSENTIAL HYPERTENSION: ICD-10-CM

## 2025-01-29 DIAGNOSIS — G62.0 CHEMOTHERAPY-INDUCED NEUROPATHY: ICD-10-CM

## 2025-01-29 DIAGNOSIS — E11.69 HYPERLIPIDEMIA ASSOCIATED WITH TYPE 2 DIABETES MELLITUS: ICD-10-CM

## 2025-01-29 DIAGNOSIS — M33.13 DERMATOMYOSITIS: Primary | ICD-10-CM

## 2025-01-29 DIAGNOSIS — T45.1X5A CHEMOTHERAPY-INDUCED NEUROPATHY: ICD-10-CM

## 2025-01-29 DIAGNOSIS — I70.0 AORTIC ATHEROSCLEROSIS: ICD-10-CM

## 2025-01-29 DIAGNOSIS — Z85.3 HISTORY OF BREAST CANCER: ICD-10-CM

## 2025-01-29 DIAGNOSIS — R09.82 POSTNASAL DRIP: ICD-10-CM

## 2025-01-29 PROCEDURE — 1160F RVW MEDS BY RX/DR IN RCRD: CPT | Mod: CPTII,S$GLB,, | Performed by: INTERNAL MEDICINE

## 2025-01-29 PROCEDURE — 4010F ACE/ARB THERAPY RXD/TAKEN: CPT | Mod: CPTII,S$GLB,, | Performed by: INTERNAL MEDICINE

## 2025-01-29 PROCEDURE — 99215 OFFICE O/P EST HI 40 MIN: CPT | Mod: S$GLB,,, | Performed by: INTERNAL MEDICINE

## 2025-01-29 PROCEDURE — 3288F FALL RISK ASSESSMENT DOCD: CPT | Mod: CPTII,S$GLB,, | Performed by: INTERNAL MEDICINE

## 2025-01-29 PROCEDURE — 99999 PR PBB SHADOW E&M-EST. PATIENT-LVL V: CPT | Mod: PBBFAC,,, | Performed by: INTERNAL MEDICINE

## 2025-01-29 PROCEDURE — 3008F BODY MASS INDEX DOCD: CPT | Mod: CPTII,S$GLB,, | Performed by: INTERNAL MEDICINE

## 2025-01-29 PROCEDURE — 3074F SYST BP LT 130 MM HG: CPT | Mod: CPTII,S$GLB,, | Performed by: INTERNAL MEDICINE

## 2025-01-29 PROCEDURE — 1124F ACP DISCUSS-NO DSCNMKR DOCD: CPT | Mod: CPTII,S$GLB,, | Performed by: INTERNAL MEDICINE

## 2025-01-29 PROCEDURE — 1101F PT FALLS ASSESS-DOCD LE1/YR: CPT | Mod: CPTII,S$GLB,, | Performed by: INTERNAL MEDICINE

## 2025-01-29 PROCEDURE — 3078F DIAST BP <80 MM HG: CPT | Mod: CPTII,S$GLB,, | Performed by: INTERNAL MEDICINE

## 2025-01-29 PROCEDURE — 1125F AMNT PAIN NOTED PAIN PRSNT: CPT | Mod: CPTII,S$GLB,, | Performed by: INTERNAL MEDICINE

## 2025-01-29 PROCEDURE — 1159F MED LIST DOCD IN RCRD: CPT | Mod: CPTII,S$GLB,, | Performed by: INTERNAL MEDICINE

## 2025-01-29 RX ORDER — FLUTICASONE PROPIONATE 50 MCG
1 SPRAY, SUSPENSION (ML) NASAL 2 TIMES DAILY
Qty: 16 G | Refills: 1 | Status: SHIPPED | OUTPATIENT
Start: 2025-01-29

## 2025-01-29 NOTE — PROGRESS NOTES
Subjective:       Patient ID: Ermelinda Verde is a 64 y.o. female.    Chief Complaint: Knee Pain (Right knee pain that started on yesterday)    HPI  DEXA 10/25/24 osteopenia.  R MMG 12/16/24 - neg  Cscope 2019 - neg. Repeat in 10 yrs.     Dermatomyositis  (found while she was on chemo) - currently undergoing IVIG q 4 wks and xeljanz 5mg BID.   This is controlling the condition well.   Chronically w/ mildly elevated CPK but is mild - 10/2024 192  Previously saw Dr. FUNEZ who has left [facility] and so est w/ Dr. Campos 10/15/24.   R knee started w/ pain yesterday - no specific inciting incident but thinks maybe stepping up into a higher van and twisted. Can feel a click w/ certain movement. Took tylenol yesterday. Already feeling better.      c/o her snoring at night for the past few months - sometimes worried about her breathing. Able to complete tasks. Wakes up refreshed. Does not have to nap.   Recently had sinus infection s/p omnicef x 10 days. Lingering postnasal drip and cough. No SOB.     DM2 - diet controlled. Needs to watch her sweets.   A1c 10/29/24 6.6  MAC 5/2/24 - neg.   Eye exam w/ Dr. Ambriz outside of [facility]. Almost time to go back for annual.     L breast CA s/p neoadjuvant chemo and s/p L mastectomy 2017.  Chemo induced neuropathy. Recurrent breast CA found in L supraclavicular LN in 2018 s/p chemoXRT 2019   Chemo induced neuropathy. Intermittently w/ numbness/tingling in the fingertips.  Follows w/ Dr. Alex Reyna in H/O - LOV 12/16/24; F/U in 6 mo.  CT A/P 11/11/24 - BONES AND SOFT TISSUES: Partially imaged postoperative changes from left mastectomy. Postoperative changes in the abdominal wall. Lumbar levoscoliosis. No acute fracture or focal osseous lesion. Unchanged 0.5 cm hypodense lesion in the pancreatic tail, which could represent a cystic lesion or invaginated fat.     HTN - benicar 5mg daily, procardia xl 30mg daily.   Checks her BP at home and reports looks good.   TTE  "2/6/20  Normal left ventricular systolic function. The estimated ejection fraction is 60%.  Normal LV diastolic function.  Normal right ventricular systolic function.  The estimated PA systolic pressure is 25 mmHg.  Normal central venous pressure (3 mmHg).    HLD - crestor 10mg daily.   Aortic atherosclerosis on CT A/P 2024   9/28/23-->75 5/2/24    Parathyroid hormone is mildly high 05/02/2024 at 76.6, new. On repeat 10/29/24, it normalized. Ca ok.    Review of Systems   Constitutional:  Negative for activity change and unexpected weight change.   HENT:  Negative for hearing loss, rhinorrhea and trouble swallowing.    Eyes:  Negative for discharge and visual disturbance.   Respiratory:  Negative for chest tightness and wheezing.    Cardiovascular:  Negative for chest pain and palpitations.   Gastrointestinal:  Positive for constipation. Negative for blood in stool, diarrhea and vomiting.   Endocrine: Negative for polydipsia and polyuria.   Genitourinary:  Negative for difficulty urinating, dysuria, hematuria and menstrual problem.   Musculoskeletal:  Positive for arthralgias and joint swelling. Negative for neck pain.   Neurological:  Negative for weakness and headaches.   Psychiatric/Behavioral:  Negative for confusion and dysphoric mood.          Objective:      Physical Exam    /70 (BP Location: Right arm, Patient Position: Sitting)   Pulse 89   Temp 98.1 °F (36.7 °C) (Oral)   Resp 16   Ht 5' 5" (1.651 m)   Wt 84 kg (185 lb 3 oz)   LMP  (LMP Unknown)   SpO2 98%   BMI 30.82 kg/m²     GEN - A+OX4, NAD   HEENT - PERRL, EOMI, OP clear. MMM. TM dullness but no erythema. No sinus tenderness to palpation.   Neck - No cervical LAD. Small thyroid nodule.   CV - RRR, no m/r   Chest - CTAB, no wheezing or rhonchi. Normal work of breathing.  Abd - S/NT/ND/+BS.   Ext - 2+BDP and radial pulses. No LE edema.   Neuro - 5/5 BUE and BLE strength.  MSK - no spinal tenderness to palpation. Scoliosis of the T and " L spine.   Skin - taut skin on the neck and face. Tenosynovitis of the hands.      Previous labs reviewed.     Assessment/Plan     Ermelinda was seen today for knee pain and nasal congestion.    Diagnoses and all orders for this visit:    Dermatomyositis - IVIG q 4 weeks. On xeljanz. Maybe flareup?  -     CK; Future  -     Sedimentation rate; Future  -     C-Reactive Protein; Future  -     ALDOLASE; Future    Immunosuppression - s/p omnicef.   -     CBC Auto Differential; Future    Postnasal drip  -     fluticasone propionate (FLONASE) 50 mcg/actuation nasal spray; 1 spray (50 mcg total) by Each Nostril route 2 (two) times a day.    Diabetes mellitus type 2 in obese - diet controlled. Will cut down on sweets.  Will get annual w/ Dr. Ambriz this year.    Hyperlipidemia associated with type 2 diabetes mellitus = cont crestor.   -     Lipid Panel; Future  -     Comprehensive Metabolic Panel; Future    History of breast cancer - f/u w/ Dr. Reyna.     Chemotherapy-induced neuropathy - avoid hot water on hands due to potential decreased feelings.    Benign essential hypertension - Stable and controlled. Continue current medications.    Aortic atherosclerosis - con tcrestor.    Advance Care Planning     Date: 01/29/2025  Patient did not wish or was not able to name a surrogate decision maker or provide an Advance Care Plan.     I spent a total of 55 minutes on the day of the visit.This includes face to face time and non-face to face time preparing to see the patient (eg, review of tests), obtaining and/or reviewing separately obtained history, documenting clinical information in the electronic or other health record, independently interpreting results and communicating results to the patient/family/caregiver, or care coordinator.      Follow up in about 3 months (around 4/29/2025).      Reta Weeks MD  Department [facility]  8:52 AM

## 2025-02-10 ENCOUNTER — PATIENT MESSAGE (OUTPATIENT)
Dept: PRIMARY CARE CLINIC | Facility: CLINIC | Age: 65
End: 2025-02-10
Payer: COMMERCIAL

## 2025-02-10 ENCOUNTER — PATIENT MESSAGE (OUTPATIENT)
Dept: RHEUMATOLOGY | Facility: CLINIC | Age: 65
End: 2025-02-10
Payer: COMMERCIAL

## 2025-02-10 ENCOUNTER — TELEPHONE (OUTPATIENT)
Dept: PRIMARY CARE CLINIC | Facility: CLINIC | Age: 65
End: 2025-02-10
Payer: COMMERCIAL

## 2025-02-10 DIAGNOSIS — M33.13 DERMATOMYOSITIS: Primary | ICD-10-CM

## 2025-02-19 ENCOUNTER — TELEPHONE (OUTPATIENT)
Dept: ORTHOPEDICS | Facility: CLINIC | Age: 65
End: 2025-02-19
Payer: COMMERCIAL

## 2025-02-19 ENCOUNTER — LAB VISIT (OUTPATIENT)
Dept: LAB | Facility: HOSPITAL | Age: 65
End: 2025-02-19
Attending: INTERNAL MEDICINE
Payer: COMMERCIAL

## 2025-02-19 ENCOUNTER — OFFICE VISIT (OUTPATIENT)
Dept: RHEUMATOLOGY | Facility: CLINIC | Age: 65
End: 2025-02-19
Payer: COMMERCIAL

## 2025-02-19 VITALS
SYSTOLIC BLOOD PRESSURE: 149 MMHG | HEART RATE: 98 BPM | HEIGHT: 65 IN | WEIGHT: 186.31 LBS | BODY MASS INDEX: 31.04 KG/M2 | DIASTOLIC BLOOD PRESSURE: 82 MMHG

## 2025-02-19 DIAGNOSIS — R20.2 NUMBNESS AND TINGLING IN RIGHT HAND: Primary | ICD-10-CM

## 2025-02-19 DIAGNOSIS — M33.13 DERMATOMYOSITIS: ICD-10-CM

## 2025-02-19 DIAGNOSIS — D84.821 IMMUNODEFICIENCY DUE TO DRUG THERAPY: ICD-10-CM

## 2025-02-19 DIAGNOSIS — R20.0 NUMBNESS AND TINGLING IN RIGHT HAND: Primary | ICD-10-CM

## 2025-02-19 DIAGNOSIS — R20.0 NUMBNESS AND TINGLING IN RIGHT HAND: ICD-10-CM

## 2025-02-19 DIAGNOSIS — Z79.899 IMMUNODEFICIENCY DUE TO DRUG THERAPY: ICD-10-CM

## 2025-02-19 DIAGNOSIS — R05.9 COUGH, UNSPECIFIED TYPE: ICD-10-CM

## 2025-02-19 DIAGNOSIS — R59.0 LOCALIZED ENLARGED LYMPH NODES: ICD-10-CM

## 2025-02-19 DIAGNOSIS — R20.2 NUMBNESS AND TINGLING IN RIGHT HAND: ICD-10-CM

## 2025-02-19 LAB
ALBUMIN SERPL BCP-MCNC: 4.3 G/DL (ref 3.5–5.2)
ALP SERPL-CCNC: 105 U/L (ref 40–150)
ALT SERPL W/O P-5'-P-CCNC: 25 U/L (ref 10–44)
ANION GAP SERPL CALC-SCNC: 9 MMOL/L (ref 8–16)
AST SERPL-CCNC: 44 U/L (ref 10–40)
BASOPHILS # BLD AUTO: 0.06 K/UL (ref 0–0.2)
BASOPHILS NFR BLD: 1.2 % (ref 0–1.9)
BILIRUB SERPL-MCNC: 0.2 MG/DL (ref 0.1–1)
BUN SERPL-MCNC: 10 MG/DL (ref 8–23)
CALCIUM SERPL-MCNC: 10.1 MG/DL (ref 8.7–10.5)
CHLORIDE SERPL-SCNC: 106 MMOL/L (ref 95–110)
CK SERPL-CCNC: 332 U/L (ref 20–180)
CO2 SERPL-SCNC: 25 MMOL/L (ref 23–29)
CREAT SERPL-MCNC: 0.8 MG/DL (ref 0.5–1.4)
CRP SERPL-MCNC: 4.1 MG/L (ref 0–8.2)
DIFFERENTIAL METHOD BLD: ABNORMAL
EOSINOPHIL # BLD AUTO: 0.1 K/UL (ref 0–0.5)
EOSINOPHIL NFR BLD: 2.5 % (ref 0–8)
ERYTHROCYTE [DISTWIDTH] IN BLOOD BY AUTOMATED COUNT: 15.6 % (ref 11.5–14.5)
ERYTHROCYTE [SEDIMENTATION RATE] IN BLOOD BY PHOTOMETRIC METHOD: 111 MM/HR (ref 0–36)
EST. GFR  (NO RACE VARIABLE): >60 ML/MIN/1.73 M^2
GLUCOSE SERPL-MCNC: 116 MG/DL (ref 70–110)
HCT VFR BLD AUTO: 39.7 % (ref 37–48.5)
HGB BLD-MCNC: 12.4 G/DL (ref 12–16)
IMM GRANULOCYTES # BLD AUTO: 0.01 K/UL (ref 0–0.04)
IMM GRANULOCYTES NFR BLD AUTO: 0.2 % (ref 0–0.5)
LYMPHOCYTES # BLD AUTO: 2.5 K/UL (ref 1–4.8)
LYMPHOCYTES NFR BLD: 47.6 % (ref 18–48)
MCH RBC QN AUTO: 27.6 PG (ref 27–31)
MCHC RBC AUTO-ENTMCNC: 31.2 G/DL (ref 32–36)
MCV RBC AUTO: 88 FL (ref 82–98)
MONOCYTES # BLD AUTO: 0.5 K/UL (ref 0.3–1)
MONOCYTES NFR BLD: 9.9 % (ref 4–15)
NEUTROPHILS # BLD AUTO: 2 K/UL (ref 1.8–7.7)
NEUTROPHILS NFR BLD: 38.6 % (ref 38–73)
NRBC BLD-RTO: 0 /100 WBC
PLATELET # BLD AUTO: 254 K/UL (ref 150–450)
PMV BLD AUTO: 9 FL (ref 9.2–12.9)
POTASSIUM SERPL-SCNC: 3.6 MMOL/L (ref 3.5–5.1)
PROT SERPL-MCNC: 10.2 G/DL (ref 6–8.4)
RBC # BLD AUTO: 4.49 M/UL (ref 4–5.4)
SODIUM SERPL-SCNC: 140 MMOL/L (ref 136–145)
WBC # BLD AUTO: 5.17 K/UL (ref 3.9–12.7)

## 2025-02-19 PROCEDURE — 86235 NUCLEAR ANTIGEN ANTIBODY: CPT | Mod: 59 | Performed by: INTERNAL MEDICINE

## 2025-02-19 PROCEDURE — 82085 ASSAY OF ALDOLASE: CPT | Performed by: INTERNAL MEDICINE

## 2025-02-19 PROCEDURE — 80053 COMPREHEN METABOLIC PANEL: CPT | Performed by: INTERNAL MEDICINE

## 2025-02-19 PROCEDURE — 85652 RBC SED RATE AUTOMATED: CPT | Performed by: INTERNAL MEDICINE

## 2025-02-19 PROCEDURE — 99999 PR PBB SHADOW E&M-EST. PATIENT-LVL IV: CPT | Mod: PBBFAC,,, | Performed by: INTERNAL MEDICINE

## 2025-02-19 PROCEDURE — 82550 ASSAY OF CK (CPK): CPT | Performed by: INTERNAL MEDICINE

## 2025-02-19 PROCEDURE — 86140 C-REACTIVE PROTEIN: CPT | Performed by: INTERNAL MEDICINE

## 2025-02-19 PROCEDURE — 99215 OFFICE O/P EST HI 40 MIN: CPT | Mod: S$GLB,,, | Performed by: INTERNAL MEDICINE

## 2025-02-19 PROCEDURE — 4010F ACE/ARB THERAPY RXD/TAKEN: CPT | Mod: CPTII,S$GLB,, | Performed by: INTERNAL MEDICINE

## 2025-02-19 PROCEDURE — 1101F PT FALLS ASSESS-DOCD LE1/YR: CPT | Mod: CPTII,S$GLB,, | Performed by: INTERNAL MEDICINE

## 2025-02-19 PROCEDURE — 1125F AMNT PAIN NOTED PAIN PRSNT: CPT | Mod: CPTII,S$GLB,, | Performed by: INTERNAL MEDICINE

## 2025-02-19 PROCEDURE — 83520 IMMUNOASSAY QUANT NOS NONAB: CPT | Performed by: INTERNAL MEDICINE

## 2025-02-19 PROCEDURE — 3079F DIAST BP 80-89 MM HG: CPT | Mod: CPTII,S$GLB,, | Performed by: INTERNAL MEDICINE

## 2025-02-19 PROCEDURE — 3288F FALL RISK ASSESSMENT DOCD: CPT | Mod: CPTII,S$GLB,, | Performed by: INTERNAL MEDICINE

## 2025-02-19 PROCEDURE — 86235 NUCLEAR ANTIGEN ANTIBODY: CPT | Performed by: INTERNAL MEDICINE

## 2025-02-19 PROCEDURE — 3077F SYST BP >= 140 MM HG: CPT | Mod: CPTII,S$GLB,, | Performed by: INTERNAL MEDICINE

## 2025-02-19 PROCEDURE — 3008F BODY MASS INDEX DOCD: CPT | Mod: CPTII,S$GLB,, | Performed by: INTERNAL MEDICINE

## 2025-02-19 PROCEDURE — 85025 COMPLETE CBC W/AUTO DIFF WBC: CPT | Performed by: INTERNAL MEDICINE

## 2025-02-19 PROCEDURE — 36415 COLL VENOUS BLD VENIPUNCTURE: CPT | Performed by: INTERNAL MEDICINE

## 2025-02-19 RX ORDER — METHYLPREDNISOLONE SOD SUCC 125 MG
80 VIAL (EA) INJECTION
Status: CANCELLED
Start: 2025-02-19

## 2025-02-19 RX ORDER — HYDROXYCHLOROQUINE SULFATE 200 MG/1
200 TABLET, FILM COATED ORAL 2 TIMES DAILY
Qty: 180 TABLET | Refills: 4 | Status: SHIPPED | OUTPATIENT
Start: 2025-02-19 | End: 2025-05-20

## 2025-02-19 RX ORDER — METHYLPREDNISOLONE SOD SUCC 125 MG
125 VIAL (EA) INJECTION
Status: CANCELLED
Start: 2025-02-19 | End: 2025-02-19

## 2025-02-19 NOTE — TELEPHONE ENCOUNTER
[facility] is committed to you overall health. Unfortunately there are currently no New Medicaid appointments available at this time. Here's a list of external facilities you can contact should you need sooner availabilities     [facility] [phone]  [address], [address] [place]  [address]  [facility] fax number [phone]

## 2025-02-19 NOTE — PROGRESS NOTES
"Subjective:      Patient ID: Ermelinda Verde is a 63 y.o. female.    Chief Complaint: No chief complaint on file.     HPI  Initial HPI:  Ermelinda Verdeis a 63 y.o.F with history of L sided infiltrating ductal carcinoma of the L breast (dx on Jan 2017), HTN, depression, gastritis, dermatomyositis  here to establish care for dermatomyositis. Patient was hospitalized from 1/22/19 till 2/13/19 for myositis. On 1/22/18 patient with c/o proximal muscle weakness. CPK found to be elevated around 4k.   During her hospitalization patient was started on high dose steroids and IV IG over 5 days.   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    Patient started on MTX 20mg SQ  with folic acid. MTX discontinued 2/18 with concern of toxicity (decrease blood counts and transaminatis). Failed Cellcept - worsening leukopenia, elevated LFTs, and other side effects without improvement of symptoms   In 2020, she had 2 Rituxan infusion and had flare of skin  so it was stopped.  She has been on IVIG monthly which has controlled her weakness. Taking xeljanz - 5 mg daily.   She is IVIG 2 G/KG BW EVERY 4 WEEKS.  She reports that she thinks her skin is overall doing "fine".        Interval history: she reports  that her skin is flaring.  She has itchiness of her lesions and they hurt in her dips.  She has redness around the eyes.      Past Medical History:   Diagnosis Date    Anemia 2019    Anxiety 2018    Breast cancer 01/01/2017    left    Controlled type 2 diabetes mellitus without complication, without long-term current use of insulin 5/16/2023    Depression     Dermatomyositis     Diverticulosis     Erosive esophagitis     Gastritis     Hepatic cyst     Hypertension     Immune disorder 2019    Joint pain 2019    Keloid cicatrix 2005    Thyroiditis     Vitamin B12 deficiency 3/8/2018       Review of Systems   Constitutional:  Negative for fever and unexpected weight change.   HENT:  Negative for mouth sores and trouble " swallowing.    Eyes:  Negative for redness.   Respiratory:  Negative for cough and shortness of breath.    Cardiovascular:  Negative for chest pain.   Gastrointestinal:  Positive for constipation. Negative for diarrhea.   Genitourinary:  Negative for dysuria and genital sores.   Skin:  Negative for rash.   Neurological:  Negative for headaches.   Hematological:  Does not bruise/bleed easily.    see HPI      Objective:   LMP  (LMP Unknown)   Physical Exam   Constitutional: She is oriented to person, place, and time.   HENT:   Head: Normocephalic and atraumatic.   Right Ear: External ear normal.   Left Ear: External ear normal.   Nose: Nose normal.   Mouth/Throat: Oropharynx is clear and moist. No oropharyngeal exudate.   Eyes: Pupils are equal, round, and reactive to light. Conjunctivae are normal. Right eye exhibits no discharge. Left eye exhibits no discharge. No scleral icterus.   Neck: No JVD present. No thyromegaly present.   Cardiovascular: Normal rate, regular rhythm and normal heart sounds. Exam reveals no gallop and no friction rub.   No murmur heard.  Pulmonary/Chest: Effort normal and breath sounds normal. No respiratory distress. She has no wheezes. She has no rales. She exhibits no tenderness.   Abdominal: Soft. Bowel sounds are normal. She exhibits no distension and no mass. There is no abdominal tenderness. There is no rebound and no guarding.   Musculoskeletal:         General: No swelling, tenderness or deformity.      Cervical back: Neck supple.   Lymphadenopathy:     She has no cervical adenopathy.   Neurological: She is alert and oriented to person, place, and time. No cranial nerve deficit. Gait normal. Coordination normal.   Skin: Skin is dry. Rash noted. No erythema. No pallor.   Psychiatric: Affect and judgment normal.          No data to display     TIF1 gamma elevated at 25 (<20 units)  Anti SSA elevated at 23 (<20 units)   1/2019:   Assessment:    64  y.o.F with history of L sided  "infiltrating ductal carcinoma of the L breast (dx on Jan 2017), HTN, depression, gastritis, dermatomyositis  here to establish care for dermatomyositis.  Patient previously following with Dr. RAY    #Dermatomyositis: she initially presented with heliotrophe, facial rash, Gottron's papules) as well as a degree vasculopathic disease (nailfold vascular changes, evidence digital pulp ulcers/lesions. Skin bx consistent with DM. She initially had muscle involvement which has been well controlled on monthly Iv IG.  Per derm "    Patient appears to have 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 No need for addition of systemic steroids with controlled myopathy and otherwise absence of systemic disease due to lack of efficacy in controlling cutaneous disease, side effect profile.   Currently with evidence of both nonvasculopathic cutaneous disease (heliotrophe, facial rash, Gottron's papules) as well as a degree vasculopathic disease (nailfold vascular changes, evidence digital pulp ulcers/lesions, associated pain at these locations)."  She is flaring form skin so will add steroid IV to her monthly infusion and will switch her to receive IV IG over 2 days    -continue Xelganz 5 mg po qday  Start plaquenil 200mg po BID (Risks of starting plaquenil discussed. Risks include eye toxicity and agrees on timely follow up with optho to avoid risks of eye toxicity. Other risks include rashes such has hyperpigmentation and vertigo.    - failed cellcept and had cytopenias  Avoiding azathioprine due to status as immediate metabolizer based on TPMT   Add Solumedrol 100mg IV to her IV IG infusions  Labs every 3 months   #right hand numbness: suspect CTS  Hand clinic consult   # abnormal CT chest: repeat again      Immunodeficiency due to drug-   -monitor carefully for infection and any toxicities associated with immunosuppressants  -advised age appropriate cancer " screenings including yearly skin exam and age appropriate vaccinations  -advised to seek immediate care in the setting of infection and hold immunosuppressive medications if there is infection      45* minutes of total time spent on the encounter, which includes face to face time and non-face to face time preparing to see the patient (eg, review of tests), Obtaining and/or reviewing separately obtained history, Documenting clinical information in the electronic or other health record, Independently interpreting results (not separately reported) and communicating results to the patient/family/caregiver, or Care coordination (not separately reported).     Rtc in 3  months

## 2025-02-19 NOTE — PROGRESS NOTES
2/13/2025    11:20 AM   Rapid3 Question Responses and Scores   MDHAQ Score 0.4   Psychologic Score 2.2   Pain Score 2   When you awakened in the morning OVER THE LAST WEEK, did you feel stiff? Yes   If Yes, please indicate the number of hours until you are as limber as you will be for the day 2   Fatigue Score 1.5   Global Health Score 3   RAPID3 Score 2.11     Answers submitted by the patient for this visit:  Rheumatology Questionnaire (Submitted on 2/13/2025)  fever: No  eye redness: No  mouth sores: Yes  headaches: No  shortness of breath: No  chest pain: No  trouble swallowing: Yes  diarrhea: No  constipation: Yes  unexpected weight change: No  genital sore: No  dysuria: No  During the last 3 days, have you had a skin rash?: Yes  Bruises or bleeds easily: No  cough: No

## 2025-02-20 ENCOUNTER — INFUSION (OUTPATIENT)
Dept: INFECTIOUS DISEASES | Facility: HOSPITAL | Age: 65
End: 2025-02-20
Payer: COMMERCIAL

## 2025-02-20 VITALS
SYSTOLIC BLOOD PRESSURE: 149 MMHG | HEART RATE: 87 BPM | TEMPERATURE: 98 F | WEIGHT: 185.06 LBS | OXYGEN SATURATION: 100 % | HEIGHT: 65 IN | RESPIRATION RATE: 18 BRPM | BODY MASS INDEX: 30.83 KG/M2 | DIASTOLIC BLOOD PRESSURE: 88 MMHG

## 2025-02-20 DIAGNOSIS — M33.20 POLYMYOSITIS ASSOCIATED WITH AUTOIMMUNE DISEASE: Primary | ICD-10-CM

## 2025-02-20 DIAGNOSIS — R09.82 POSTNASAL DRIP: ICD-10-CM

## 2025-02-20 DIAGNOSIS — M35.9 POLYMYOSITIS ASSOCIATED WITH AUTOIMMUNE DISEASE: Primary | ICD-10-CM

## 2025-02-20 DIAGNOSIS — J98.8 CONGESTION OF UPPER AIRWAY: Primary | ICD-10-CM

## 2025-02-20 PROCEDURE — 96365 THER/PROPH/DIAG IV INF INIT: CPT

## 2025-02-20 PROCEDURE — 96366 THER/PROPH/DIAG IV INF ADDON: CPT

## 2025-02-20 PROCEDURE — 63600175 PHARM REV CODE 636 W HCPCS: Mod: JZ,TB | Performed by: INTERNAL MEDICINE

## 2025-02-20 PROCEDURE — 96411 CHEMO IV PUSH ADDL DRUG: CPT

## 2025-02-20 RX ORDER — SODIUM CHLORIDE 0.9 % (FLUSH) 0.9 %
10 SYRINGE (ML) INJECTION
Status: CANCELLED | OUTPATIENT
Start: 2025-02-24

## 2025-02-20 RX ORDER — HEPARIN 100 UNIT/ML
500 SYRINGE INTRAVENOUS
Status: CANCELLED | OUTPATIENT
Start: 2025-02-24

## 2025-02-20 RX ORDER — ACETAMINOPHEN 325 MG/1
650 TABLET ORAL
Status: CANCELLED | OUTPATIENT
Start: 2025-02-24

## 2025-02-20 RX ORDER — METHYLPREDNISOLONE SOD SUCC 125 MG
80 VIAL (EA) INJECTION
Status: CANCELLED
Start: 2025-02-24 | End: 2025-02-24

## 2025-02-20 RX ORDER — DIPHENHYDRAMINE HYDROCHLORIDE 50 MG/ML
25 INJECTION INTRAMUSCULAR; INTRAVENOUS
OUTPATIENT
Start: 2025-02-24

## 2025-02-20 RX ORDER — METHYLPREDNISOLONE SOD SUCC 125 MG
125 VIAL (EA) INJECTION
Start: 2025-02-24 | End: 2025-02-24

## 2025-02-20 RX ORDER — FLUTICASONE PROPIONATE 50 MCG
1 SPRAY, SUSPENSION (ML) NASAL DAILY
Qty: 48 ML | Refills: 1 | Status: SHIPPED | OUTPATIENT
Start: 2025-02-20 | End: 2025-05-21

## 2025-02-20 RX ORDER — SODIUM CHLORIDE 0.9 % (FLUSH) 0.9 %
10 SYRINGE (ML) INJECTION
Status: DISCONTINUED | OUTPATIENT
Start: 2025-02-20 | End: 2025-02-20 | Stop reason: HOSPADM

## 2025-02-20 RX ORDER — METHYLPREDNISOLONE SOD SUCC 125 MG
80 VIAL (EA) INJECTION
Status: COMPLETED | OUTPATIENT
Start: 2025-02-20 | End: 2025-02-20

## 2025-02-20 RX ORDER — FAMOTIDINE 10 MG/ML
20 INJECTION INTRAVENOUS
Status: CANCELLED | OUTPATIENT
Start: 2025-02-24

## 2025-02-20 RX ADMIN — HUMAN IMMUNOGLOBULIN G 85 G: 40 LIQUID INTRAVENOUS at 08:02

## 2025-02-20 RX ADMIN — METHYLPREDNISOLONE SODIUM SUCCINATE 80 MG: 125 INJECTION, POWDER, FOR SOLUTION INTRAMUSCULAR; INTRAVENOUS at 08:02

## 2025-02-20 NOTE — PLAN OF CARE
Pt educated on importance of handwashing. Verbalized understanding to prevent risk for infection.

## 2025-02-20 NOTE — PROGRESS NOTES
Patient arrived to infusion suite for Privigen 85 g infusion. Patient took pre medications of Tylenol 650 mg PO, Pepcid 20 mg PO, and Benadryl 25 mg PO 30 minutes prior to arrival. Patient received premed of Methylprednisolone 80 mg IVP, given 30 minutes prior to the start of infusion.    Privigen initiated and titrated as ordered with the following rates every 30 minutes: 25 ml/hr, 50 ml/hr, 101 ml/hr and max rate of 202 ml/hr until infusion volume completed. V/S monitored with each titration rate changes. Vital signs stable. Patient tolerated well.     Next appointment scheduled and patient made aware.     Limited head-to-toe assessment due to privacy issues and visit reason though the opportunity was given for patient to express any concerns.

## 2025-02-21 LAB — ALDOLASE SERPL-CCNC: 3.2 U/L (ref 1.2–7.6)

## 2025-02-21 RX ORDER — METHYLPREDNISOLONE SOD SUCC 125 MG
80 VIAL (EA) INJECTION
Start: 2025-03-06 | End: 2025-03-06

## 2025-02-21 RX ORDER — ACETAMINOPHEN 325 MG/1
650 TABLET ORAL
OUTPATIENT
Start: 2025-03-06

## 2025-02-21 RX ORDER — FAMOTIDINE 10 MG/ML
20 INJECTION INTRAVENOUS
OUTPATIENT
Start: 2025-03-06

## 2025-02-21 RX ORDER — SODIUM CHLORIDE 0.9 % (FLUSH) 0.9 %
10 SYRINGE (ML) INJECTION
OUTPATIENT
Start: 2025-03-06

## 2025-02-21 RX ORDER — HEPARIN 100 UNIT/ML
500 SYRINGE INTRAVENOUS
OUTPATIENT
Start: 2025-03-06

## 2025-02-24 ENCOUNTER — RESULTS FOLLOW-UP (OUTPATIENT)
Dept: RHEUMATOLOGY | Facility: CLINIC | Age: 65
End: 2025-02-24

## 2025-02-24 LAB — ENA SCL70 AB SER-ACNC: 0.9 U/ML

## 2025-02-26 ENCOUNTER — TELEPHONE (OUTPATIENT)
Dept: INFECTIOUS DISEASES | Facility: HOSPITAL | Age: 65
End: 2025-02-26
Payer: COMMERCIAL

## 2025-02-26 NOTE — TELEPHONE ENCOUNTER
Spoke to Mrs. Verde (patient) to inform that Dr. Campos, strongly recommend that she goes in for 2 days of IVIG infusion every  4 weeks, as this will be very beneficial for myositis and flare ups. Patient acknowledge the recommendation from Dr. Campos, with good verbal return understanding. Patient infusion schedule adjusted to reflect 2 days treatment of IVIG infusion. Patient agreed and made aware.

## 2025-02-27 LAB — RNAP III AB SER-ACNC: <20 UNITS

## 2025-03-12 DIAGNOSIS — M79.641 RIGHT HAND PAIN: Primary | ICD-10-CM

## 2025-03-19 ENCOUNTER — INFUSION (OUTPATIENT)
Dept: INFECTIOUS DISEASES | Facility: HOSPITAL | Age: 65
End: 2025-03-19
Attending: INTERNAL MEDICINE
Payer: COMMERCIAL

## 2025-03-19 VITALS
BODY MASS INDEX: 30.35 KG/M2 | SYSTOLIC BLOOD PRESSURE: 148 MMHG | TEMPERATURE: 98 F | OXYGEN SATURATION: 99 % | HEIGHT: 65 IN | WEIGHT: 182.19 LBS | HEART RATE: 95 BPM | RESPIRATION RATE: 20 BRPM | DIASTOLIC BLOOD PRESSURE: 81 MMHG

## 2025-03-19 DIAGNOSIS — M33.20 POLYMYOSITIS ASSOCIATED WITH AUTOIMMUNE DISEASE: Primary | ICD-10-CM

## 2025-03-19 DIAGNOSIS — M35.9 POLYMYOSITIS ASSOCIATED WITH AUTOIMMUNE DISEASE: Primary | ICD-10-CM

## 2025-03-19 PROCEDURE — 63600175 PHARM REV CODE 636 W HCPCS: Mod: JZ,TB | Performed by: INTERNAL MEDICINE

## 2025-03-19 PROCEDURE — 25000003 PHARM REV CODE 250

## 2025-03-19 PROCEDURE — 96375 TX/PRO/DX INJ NEW DRUG ADDON: CPT

## 2025-03-19 PROCEDURE — 63600175 PHARM REV CODE 636 W HCPCS

## 2025-03-19 PROCEDURE — 96365 THER/PROPH/DIAG IV INF INIT: CPT

## 2025-03-19 PROCEDURE — 96366 THER/PROPH/DIAG IV INF ADDON: CPT

## 2025-03-19 RX ORDER — METHYLPREDNISOLONE SOD SUCC 125 MG
80 VIAL (EA) INJECTION
Status: CANCELLED
Start: 2025-03-19 | End: 2025-03-19

## 2025-03-19 RX ORDER — FAMOTIDINE 10 MG/ML
20 INJECTION, SOLUTION INTRAVENOUS
Status: COMPLETED | OUTPATIENT
Start: 2025-03-19 | End: 2025-03-19

## 2025-03-19 RX ORDER — SODIUM CHLORIDE 0.9 % (FLUSH) 0.9 %
10 SYRINGE (ML) INJECTION
Status: CANCELLED | OUTPATIENT
Start: 2025-03-19

## 2025-03-19 RX ORDER — ACETAMINOPHEN 325 MG/1
650 TABLET ORAL
Status: CANCELLED | OUTPATIENT
Start: 2025-03-19

## 2025-03-19 RX ORDER — ACETAMINOPHEN 325 MG/1
650 TABLET ORAL
Status: COMPLETED | OUTPATIENT
Start: 2025-03-19 | End: 2025-03-19

## 2025-03-19 RX ORDER — METHYLPREDNISOLONE SOD SUCC 125 MG
125 VIAL (EA) INJECTION
Status: CANCELLED
Start: 2025-03-19 | End: 2025-03-19

## 2025-03-19 RX ORDER — METHYLPREDNISOLONE SOD SUCC 125 MG
125 VIAL (EA) INJECTION
Status: CANCELLED
Start: 2025-03-19

## 2025-03-19 RX ORDER — HEPARIN 100 UNIT/ML
500 SYRINGE INTRAVENOUS
Status: CANCELLED | OUTPATIENT
Start: 2025-03-19

## 2025-03-19 RX ORDER — SODIUM CHLORIDE 0.9 % (FLUSH) 0.9 %
10 SYRINGE (ML) INJECTION
Status: DISCONTINUED | OUTPATIENT
Start: 2025-03-19 | End: 2025-03-19 | Stop reason: HOSPADM

## 2025-03-19 RX ORDER — FAMOTIDINE 10 MG/ML
20 INJECTION, SOLUTION INTRAVENOUS
Status: CANCELLED | OUTPATIENT
Start: 2025-03-19

## 2025-03-19 RX ORDER — DIPHENHYDRAMINE HYDROCHLORIDE 50 MG/ML
25 INJECTION, SOLUTION INTRAMUSCULAR; INTRAVENOUS
Status: COMPLETED | OUTPATIENT
Start: 2025-03-19 | End: 2025-03-19

## 2025-03-19 RX ORDER — DIPHENHYDRAMINE HYDROCHLORIDE 50 MG/ML
25 INJECTION, SOLUTION INTRAMUSCULAR; INTRAVENOUS
Status: CANCELLED | OUTPATIENT
Start: 2025-03-19

## 2025-03-19 RX ORDER — METHYLPREDNISOLONE SOD SUCC 125 MG
125 VIAL (EA) INJECTION
Status: COMPLETED | OUTPATIENT
Start: 2025-03-19 | End: 2025-03-19

## 2025-03-19 RX ADMIN — FAMOTIDINE 20 MG: 10 INJECTION, SOLUTION INTRAVENOUS at 08:03

## 2025-03-19 RX ADMIN — ACETAMINOPHEN 650 MG: 325 TABLET ORAL at 08:03

## 2025-03-19 RX ADMIN — DIPHENHYDRAMINE HYDROCHLORIDE 25 MG: 50 INJECTION INTRAMUSCULAR; INTRAVENOUS at 08:03

## 2025-03-19 RX ADMIN — HUMAN IMMUNOGLOBULIN G 85 G: 40 LIQUID INTRAVENOUS at 08:03

## 2025-03-19 RX ADMIN — METHYLPREDNISOLONE SODIUM SUCCINATE 125 MG: 125 INJECTION, POWDER, FOR SOLUTION INTRAMUSCULAR; INTRAVENOUS at 08:03

## 2025-03-19 NOTE — PROGRESS NOTES
Patient arrived to infusion suite for Privigen 85 g, day 1/2 infusion. Patient received premedications of Tylenol 650 mg PO, Pepcid 20 mg IVP, Benadryl 25 mg slow IVP and Methylprednisolone 125 mg IVP, given 30 minutes prior to the start of infusion.    Privigen initiated and titrated as ordered with the following rates every 30 minutes: 25 ml/hr, 50 ml/hr, 99 ml/hr and max rate of 198 ml/hr until infusion volume completed. V/S monitored with each titration rate changes. Vital signs stable. Patient tolerated well.     Next appointment scheduled and patient made aware.     Limited head-to-toe assessment due to privacy issues and visit reason though the opportunity was given for patient to express any concerns.

## 2025-03-20 ENCOUNTER — INFUSION (OUTPATIENT)
Dept: INFECTIOUS DISEASES | Facility: HOSPITAL | Age: 65
End: 2025-03-20
Attending: INTERNAL MEDICINE
Payer: COMMERCIAL

## 2025-03-20 VITALS
WEIGHT: 184.06 LBS | BODY MASS INDEX: 30.67 KG/M2 | HEIGHT: 65 IN | HEART RATE: 91 BPM | RESPIRATION RATE: 20 BRPM | TEMPERATURE: 98 F | SYSTOLIC BLOOD PRESSURE: 151 MMHG | DIASTOLIC BLOOD PRESSURE: 89 MMHG | OXYGEN SATURATION: 100 %

## 2025-03-20 DIAGNOSIS — M35.9 POLYMYOSITIS ASSOCIATED WITH AUTOIMMUNE DISEASE: Primary | ICD-10-CM

## 2025-03-20 DIAGNOSIS — M33.20 POLYMYOSITIS ASSOCIATED WITH AUTOIMMUNE DISEASE: Primary | ICD-10-CM

## 2025-03-20 PROCEDURE — 63600175 PHARM REV CODE 636 W HCPCS

## 2025-03-20 PROCEDURE — 63600175 PHARM REV CODE 636 W HCPCS: Mod: JZ,TB | Performed by: INTERNAL MEDICINE

## 2025-03-20 PROCEDURE — 96366 THER/PROPH/DIAG IV INF ADDON: CPT

## 2025-03-20 PROCEDURE — 96375 TX/PRO/DX INJ NEW DRUG ADDON: CPT

## 2025-03-20 PROCEDURE — 96365 THER/PROPH/DIAG IV INF INIT: CPT

## 2025-03-20 RX ORDER — ACETAMINOPHEN 325 MG/1
650 TABLET ORAL
OUTPATIENT
Start: 2025-03-20

## 2025-03-20 RX ORDER — SODIUM CHLORIDE 0.9 % (FLUSH) 0.9 %
10 SYRINGE (ML) INJECTION
OUTPATIENT
Start: 2025-03-20

## 2025-03-20 RX ORDER — METHYLPREDNISOLONE SOD SUCC 125 MG
125 VIAL (EA) INJECTION
Start: 2025-03-20 | End: 2025-03-20

## 2025-03-20 RX ORDER — SODIUM CHLORIDE 0.9 % (FLUSH) 0.9 %
10 SYRINGE (ML) INJECTION
Status: DISCONTINUED | OUTPATIENT
Start: 2025-03-20 | End: 2025-03-20 | Stop reason: HOSPADM

## 2025-03-20 RX ORDER — FAMOTIDINE 10 MG/ML
20 INJECTION, SOLUTION INTRAVENOUS
OUTPATIENT
Start: 2025-03-20

## 2025-03-20 RX ORDER — DIPHENHYDRAMINE HYDROCHLORIDE 50 MG/ML
25 INJECTION, SOLUTION INTRAMUSCULAR; INTRAVENOUS
OUTPATIENT
Start: 2025-03-20

## 2025-03-20 RX ORDER — DIPHENHYDRAMINE HYDROCHLORIDE 50 MG/ML
25 INJECTION, SOLUTION INTRAMUSCULAR; INTRAVENOUS
Status: COMPLETED | OUTPATIENT
Start: 2025-03-20 | End: 2025-03-20

## 2025-03-20 RX ORDER — HEPARIN 100 UNIT/ML
500 SYRINGE INTRAVENOUS
OUTPATIENT
Start: 2025-03-20

## 2025-03-20 RX ORDER — METHYLPREDNISOLONE SOD SUCC 125 MG
125 VIAL (EA) INJECTION
Status: COMPLETED | OUTPATIENT
Start: 2025-03-20 | End: 2025-03-20

## 2025-03-20 RX ADMIN — DIPHENHYDRAMINE HYDROCHLORIDE 25 MG: 50 INJECTION INTRAMUSCULAR; INTRAVENOUS at 08:03

## 2025-03-20 RX ADMIN — HUMAN IMMUNOGLOBULIN G 85 G: 40 LIQUID INTRAVENOUS at 08:03

## 2025-03-20 RX ADMIN — METHYLPREDNISOLONE SODIUM SUCCINATE 125 MG: 125 INJECTION, POWDER, FOR SOLUTION INTRAMUSCULAR; INTRAVENOUS at 08:03

## 2025-03-20 NOTE — PROGRESS NOTES
Patient arrived to infusion suite for Privigen 85 g, day 2/2 infusion. Patient stated she took her premed of Tylenol 650 mg PO and Pepcid 20 mg PO at home prior to arrival today. Patient received Premed of Benadryl 25 mg slow IVP and Methylprednisolone 125 mg IVP, given 30 minutes prior to the start of infusion.    Privigen initiated and titrated as ordered with the following rates every 30 minutes: 25 ml/hr, 50 ml/hr, 100 ml/hr and max rate of 200 ml/hr until infusion volume completed. V/S monitored with each titration rate changes. Vital signs stable. Patient tolerated.     Next appointment scheduled and patient made aware.     Limited head-to-toe assessment due to privacy issues and visit reason though the opportunity was given for patient to express any concerns.

## 2025-03-26 ENCOUNTER — TELEPHONE (OUTPATIENT)
Dept: ORTHOPEDICS | Facility: CLINIC | Age: 65
End: 2025-03-26
Payer: COMMERCIAL

## 2025-03-26 NOTE — TELEPHONE ENCOUNTER
LVM c pt. Attempting to confirm appt location & time c Dr. Lugo  with XR. Requested a call back to the [facility] at [phone] with any questions, concerns or need for a different appointment time.

## 2025-03-27 ENCOUNTER — HOSPITAL ENCOUNTER (OUTPATIENT)
Dept: RADIOLOGY | Facility: OTHER | Age: 65
Discharge: HOME | End: 2025-03-27
Attending: ORTHOPAEDIC SURGERY
Payer: COMMERCIAL

## 2025-03-27 ENCOUNTER — OFFICE VISIT (OUTPATIENT)
Dept: ORTHOPEDICS | Facility: CLINIC | Age: 65
End: 2025-03-27
Payer: COMMERCIAL

## 2025-03-27 DIAGNOSIS — R20.2 NUMBNESS AND TINGLING IN RIGHT HAND: Primary | ICD-10-CM

## 2025-03-27 DIAGNOSIS — M79.641 RIGHT HAND PAIN: ICD-10-CM

## 2025-03-27 DIAGNOSIS — R20.0 NUMBNESS AND TINGLING IN RIGHT HAND: Primary | ICD-10-CM

## 2025-03-27 PROCEDURE — 73130 X-RAY EXAM OF HAND: CPT | Mod: TC,FY,RT

## 2025-03-27 PROCEDURE — 73130 X-RAY EXAM OF HAND: CPT | Mod: 26,RT,, | Performed by: RADIOLOGY

## 2025-03-27 PROCEDURE — 99999 PR PBB SHADOW E&M-EST. PATIENT-LVL IV: CPT | Mod: PBBFAC,,, | Performed by: ORTHOPAEDIC SURGERY

## 2025-03-27 PROCEDURE — 3288F FALL RISK ASSESSMENT DOCD: CPT | Mod: CPTII,S$GLB,, | Performed by: ORTHOPAEDIC SURGERY

## 2025-03-27 PROCEDURE — 99205 OFFICE O/P NEW HI 60 MIN: CPT | Mod: S$GLB,,, | Performed by: ORTHOPAEDIC SURGERY

## 2025-03-27 PROCEDURE — 1101F PT FALLS ASSESS-DOCD LE1/YR: CPT | Mod: CPTII,S$GLB,, | Performed by: ORTHOPAEDIC SURGERY

## 2025-03-27 PROCEDURE — 1159F MED LIST DOCD IN RCRD: CPT | Mod: CPTII,S$GLB,, | Performed by: ORTHOPAEDIC SURGERY

## 2025-03-27 PROCEDURE — 4010F ACE/ARB THERAPY RXD/TAKEN: CPT | Mod: CPTII,S$GLB,, | Performed by: ORTHOPAEDIC SURGERY

## 2025-03-27 NOTE — PROGRESS NOTES
[facility]  History & Physical  Orthopedics    SUBJECTIVE:      Chief Complaint: Right hand numbness and tingling    Referring Provider: Arabella Campos MD     History of Present Illness:  Patient is a 64 y.o. right hand dominant female with past medical history of dermatomyositis who presents today with complaints of right hand numbness and tingling.     Onset of symptoms/DOI was 1 month ago.    Symptoms are aggravated by  turning her head towards the right .    There are no alleviating factors.    Symptoms consist of numbness/tingling.  Numbness and tingling from the shoulder radiating distally to the hand, she reports the numbness and tingling is diffuse to the right hand but predominantly involves the long finger and ring finger.    Attempted treatment(s) and/or interventions include activity modifications, rest, anti-inflammatory medications.     The patient denies any fevers, chills, N/V, D/C and presents for evaluation.       Past Medical History:   Diagnosis Date    Anemia 2019    Anxiety 2018    Breast cancer 2017    left    Controlled type 2 diabetes mellitus without complication, without long-term current use of insulin 2023    Depression     Dermatomyositis     Diverticulosis     Erosive esophagitis     Gastritis     Hepatic cyst     Hypertension     Immune disorder 2019    Joint pain 2019    Keloid cicatrix 2005    Thyroiditis     Vitamin B12 deficiency 3/8/2018     Past Surgical History:   Procedure Laterality Date    BREAST BIOPSY Left     BREAST RECONSTRUCTION Left 2017     SECTION      COLONOSCOPY  2011    repeat in 10 yrs.    COLONOSCOPY N/A 2019    Procedure: COLONOSCOPY;  Surgeon: Pernell Rosas MD;  Location: [address]);  Service: Endoscopy;  Laterality: N/A;  Patient would like to use her PEG tube for bowel prep and then have her PEG tube removed after EGD and colonoscopy procedures while she is still sedated.    D&C       ESOPHAGOGASTRODUODENOSCOPY N/A 1/29/2019    Procedure: EGD (ESOPHAGOGASTRODUODENOSCOPY);  Surgeon: Pernell Rosas MD;  Location: [facility] (2ND FLR);  Service: Endoscopy;  Laterality: N/A;    ESOPHAGOGASTRODUODENOSCOPY N/A 7/29/2019    Procedure: EGD (ESOPHAGOGASTRODUODENOSCOPY);  Surgeon: Pernell Rosas MD;  Location: [facility] (4TH FLR);  Service: Endoscopy;  Laterality: N/A;  EGD for follow-up of erosive esophagitis per Dr. Rosas    Patient would like to use her PEG tube for bowel prep and then have her PEG tube removed after EGD and colonoscopy procedures while she is still sedated.    INSERTION OF TUNNELED CENTRAL VENOUS CATHETER (CVC) WITH SUBCUTANEOUS PORT Right 10/31/2018    Procedure: [id number];  Surgeon: Deo Palencia MD;  Location: [facility] OR 2ND FLR;  Service: General;  Laterality: Right;    MASTECTOMY Left 06/26/2017    MYOMECTOMY      PORTACATH PLACEMENT      SENTINEL LYMPH NODE BIOPSY  02/2017    left    SKIN BIOPSY  2019    TOTAL REDUCTION MAMMOPLASTY Right 2017    UPPER GASTROINTESTINAL ENDOSCOPY       Review of patient's allergies indicates:  No Known Allergies  Social History     Social History Narrative    Not on file     Family History   Problem Relation Name Age of Onset    Hypertension Mother      Heart disease Father      Hypertension Father      Breast cancer Sister  52    No Known Problems Sister      No Known Problems Brother      No Known Problems Brother      No Known Problems Brother      Thyroid disease Daughter          hyperthyroidism s/p thyroidectomy    No Known Problems Daughter      No Known Problems Son      Colon cancer Neg Hx      Ovarian cancer Neg Hx      Celiac disease Neg Hx      Cirrhosis Neg Hx      Colon polyps Neg Hx      Esophageal cancer Neg Hx      Inflammatory bowel disease Neg Hx      Liver cancer Neg Hx      Liver disease Neg Hx      Rectal cancer Neg Hx      Stomach cancer Neg Hx      Ulcerative colitis Neg Hx      Melanoma Neg Hx         Current  Medications[1]      Review of Systems:  As per HPI otherwise noncontributory    OBJECTIVE:      Vital Signs (Most Recent):  There were no vitals filed for this visit.  There is no height or weight on file to calculate BMI.      Physical Exam:  Constitutional: The patient appears well-developed and well-nourished. No distress.   Skin: No lesions appreciated  Head: Normocephalic and atraumatic.   Nose: Nose normal.   Ears: No deformities seen  Eyes: Conjunctivae and EOM are normal.   Neck: No tracheal deviation present.   Cardiovascular: Normal rate and intact distal pulses.    Pulmonary/Chest: Effort normal. No respiratory distress.   Abdominal: There is no guarding.   Neurological: The patient is alert.   Psychiatric: The patient has a normal mood and affect.     Right Hand/Wrist Examination:    Observation/Inspection:  Swelling  none    Deformity  none  Discoloration  none     Scars   none    Atrophy  none    HAND/WRIST EXAMINATION:  Nontender to palpation throughout  Skin intact throughout, no open wounds    Neurovascular Exam:  Digits WWP, brisk CR < 3s throughout  NVI motor/LTS to M/R/U nerves, radial pulse 2+  Tinel's Test - Carpal Tunnel  Pos  Tinel's Test - Cubital Tunnel  Pos  Phalen's Test    Pos  Median Nerve Compression Test Pos    ROM hand full, painless    ROM wrist full, painless    ROM elbow full, painless    Abdomen not guarded  Respirations nonlabored  Perfusion intact    Diagnostic Results:     Imaging - I independently viewed the patient's imaging as well as the radiology report.  Xrays of the patient's right hand demonstrate no evidence of any acute fractures or dislocations or significant degenerative changes.    EMG - N/A    ASSESSMENT/PLAN:      64 y.o. yo female with possible carpal tunnel and cubital tunnel syndrome with cervical radiculopathy, possible double crush phenomenon.    Plan: The patient and I had a thorough discussion today.  We discussed the working diagnosis as well as several  other potential alternative diagnoses.  Treatment options were discussed, both conservative and surgical.  Conservative treatment options would include things such as activity modifications, workplace modifications, a period of rest, oral vs topical OTC and prescription anti-inflammatory medications, occupational therapy, splinting/bracing, immobilization, corticosteroid injections, and others.  Surgical options were discussed as well.     At this time, the patient would like to proceed with EMG for further evaluation of peripheral nerve compression, cervical spine x-rays, physical therapy for her neck, and referral to spine surgery.    Should the patient's symptoms worsen, persist, or fail to improve they should return for reevaluation and I would be happy to see them back anytime.             [1]   Current Outpatient Medications:     betamethasone dipropionate (DIPROLENE) 0.05 % ointment, Apply topically 2 (two) times daily. Use to affected areas for up to 2 weeks then take a 1 week break or decrease to 3 times weekly. Do not apply to groin or face. Use to hands, Disp: 90 g, Rfl: 3    estradioL (ESTRACE) 0.01 % (0.1 mg/gram) vaginal cream, , Disp: , Rfl:     fluticasone propionate (FLONASE) 50 mcg/actuation nasal spray, 1 spray (50 mcg total) by Each Nostril route Daily., Disp: 48 mL, Rfl: 1    hydroxychloroquine (PLAQUENIL) 200 mg tablet, Take 1 tablet (200 mg total) by mouth 2 (two) times daily., Disp: 180 tablet, Rfl: 4    levocetirizine (XYZAL) 5 MG tablet, TAKE 1 TABLET BY MOUTH EVERY DAY IN THE EVENING, Disp: 30 tablet, Rfl: 8    magnesium 30 mg Tab, Take 1 tablet by mouth Daily., Disp: , Rfl:     multivitamin capsule, Take 1 capsule by mouth once daily., Disp: , Rfl:     naproxen (NAPROSYN) 500 MG tablet, Take 1 tablet (500 mg total) by mouth 2 (two) times daily with meals., Disp: 20 tablet, Rfl: 0    NIFEdipine (PROCARDIA-XL) 30 MG (OSM) 24 hr tablet, Take 1 tablet (30 mg total) by mouth once daily., Disp:  90 tablet, Rfl: 3    olmesartan (BENICAR) 5 MG Tab, Take 1 tablet (5 mg total) by mouth once daily., Disp: 90 tablet, Rfl: 3    pseudoephedrine-DM-guaiFENesin (POLY-VENT DM) 60- mg Tab, Take 1 tablet by mouth every 6 (six) hours as needed. (Patient not taking: Reported on 2/19/2025), Disp: 20 tablet, Rfl: 0    rosuvastatin (CRESTOR) 10 MG tablet, Take 1 tablet (10 mg total) by mouth once daily., Disp: 90 tablet, Rfl: 1    tofacitinib (XELJANZ) 5 mg Tab, Take 5 mg by mouth 2 (two) times daily., Disp: , Rfl:     vitamin D (VITAMIN D3) 1000 units Tab, Take 1,000 Units by mouth once daily., Disp: , Rfl:

## 2025-03-31 NOTE — PROGRESS NOTES
DATE: 3/31/2025  PATIENT: Ermelinda Verde    Supervising Physician: Marcos Weeks M.D.    CHIEF COMPLAINT: left arm pain    HISTORY:  Ermelinda Verde is a 64 y.o. female with a pmhx of DM II and breast cx here for initial evaluation of neck and right arm pain (Neck - 5, Arm - 5). The pain has been present for a month without injury. The patient describes the pain as radiating down her right arm with some mild left arm pain. The pain is worse with activity and improved by rest. There is positive associated numbness and tingling. There is negative subjective weakness. Prior treatments have included no PT, ESIs, surgery.     The patient denies myelopathic symptoms such as handwriting changes or difficulty with buttons/coins/keys. Denies perineal paresthesias, bowel/bladder dysfunction.    PAST MEDICAL/SURGICAL HISTORY:  Past Medical History:   Diagnosis Date    Anemia 2019    Anxiety 2018    Breast cancer 2017    left    Controlled type 2 diabetes mellitus without complication, without long-term current use of insulin 2023    Depression     Dermatomyositis     Diverticulosis     Erosive esophagitis     Gastritis     Hepatic cyst     Hypertension     Immune disorder 2019    Joint pain 2019    Keloid cicatrix 2005    Thyroiditis     Vitamin B12 deficiency 3/8/2018     Past Surgical History:   Procedure Laterality Date    BREAST BIOPSY Left     BREAST RECONSTRUCTION Left 2017     SECTION      COLONOSCOPY  2011    repeat in 10 yrs.    COLONOSCOPY N/A 2019    Procedure: COLONOSCOPY;  Surgeon: Pernell Rosas MD;  Location: [facility] (4TH FLR);  Service: Endoscopy;  Laterality: N/A;  Patient would like to use her PEG tube for bowel prep and then have her PEG tube removed after EGD and colonoscopy procedures while she is still sedated.    D&C      ESOPHAGOGASTRODUODENOSCOPY N/A 2019    Procedure: EGD (ESOPHAGOGASTRODUODENOSCOPY);  Surgeon: Pernell Rosas MD;  Location: [facility] (2ND FLR);   Service: Endoscopy;  Laterality: N/A;    ESOPHAGOGASTRODUODENOSCOPY N/A 7/29/2019    Procedure: EGD (ESOPHAGOGASTRODUODENOSCOPY);  Surgeon: Pernell Rosas MD;  Location: [facility] (4TH FLR);  Service: Endoscopy;  Laterality: N/A;  EGD for follow-up of erosive esophagitis per Dr. Rosas    Patient would like to use her PEG tube for bowel prep and then have her PEG tube removed after EGD and colonoscopy procedures while she is still sedated.    INSERTION OF TUNNELED CENTRAL VENOUS CATHETER (CVC) WITH SUBCUTANEOUS PORT Right 10/31/2018    Procedure: [id number];  Surgeon: Deo Palencia MD;  Location: [facility] OR 2ND FLR;  Service: General;  Laterality: Right;    MASTECTOMY Left 06/26/2017    MYOMECTOMY      PORTACATH PLACEMENT      SENTINEL LYMPH NODE BIOPSY  02/2017    left    SKIN BIOPSY  2019    TOTAL REDUCTION MAMMOPLASTY Right 2017    UPPER GASTROINTESTINAL ENDOSCOPY         Medications:  Medications Ordered Prior to Encounter[1]    Social History: Social History[2]    REVIEW OF SYSTEMS:  Constitution: Negative. Negative for chills, fever and night sweats.   Cardiovascular: Negative for chest pain and syncope.   Respiratory: Negative for cough and shortness of breath.   Gastrointestinal: See HPI. Negative for nausea/vomiting. Negative for abdominal pain.  Genitourinary: See HPI. Negative for discoloration or dysuria.  Skin: Negative for dry skin, itching and rash.   Hematologic/Lymphatic: Negative for bleeding problem. Does not bruise/bleed easily.   Musculoskeletal: Negative for falls and muscle weakness.   Neurological: See HPI. No seizures.   Endocrine: Negative for polydipsia, polyphagia and polyuria.   Allergic/Immunologic: Negative for hives and persistent infections.  Psychiatric/Behavioral: Negative for depression and insomnia.         EXAM:  LMP  (LMP Unknown)     General: The patient is a very pleasant 64 y.o. female in no apparent distress, the patient is oriented to person, place and time.  Psych:  Normal mood and affect  HEENT: Vision grossly intact, hearing intact to the spoken word.  Lungs: Respirations unlabored.  Gait: Normal station and gait, no difficulty with toe or heel walk.   Skin: Cervical skin negative for rashes, lesions, hairy patches and surgical scars.  Range of motion: Cervical range of motion is acceptable. There is negative tenderness to palpation.  Spinal Balance: Global saggital and coronal spinal balance acceptable, no significant for scoliosis and kyphosis.  Musculoskeletal: No pain with the range of motion of the bilateral shoulders and elbows. Normal bulk and contour of the bilateral hands.  Vascular: Bilateral hands warm and well perfused, radial pulses 2+ bilaterally.  Neurological: Normal strength and tone in all major motor groups in the bilateral upper and lower extremities. Normal sensation to light touch in the C5-T1 and L2-S1 dermatomes bilaterally.  Deep tendon reflexes symmetric 2+ in the bilateral upper and lower extremities.  Negative Inverted Radial Reflex and Sanchez's bilaterally. Negative Babinski bilaterally.     IMAGING:   Today I personally reviewed AP, Lat and Flex/Ex  upright C-spine films that demonstrate moderate degenerative changes     There is no height or weight on file to calculate BMI.    Hemoglobin A1C   Date Value Ref Range Status   10/29/2024 6.6 (H) 4.0 - 5.6 % Final     Comment:     ADA Screening Guidelines:  5.7-6.4% Consistent with prediabetes  >or=6.5% Consistent with diabetes     05/02/2024 6.4 (H) 4.0 - 5.6 % Final     Comment:     ADA Screening Guidelines:  5.7-6.4% Consistent with prediabetes  >or=6.5% Consistent with diabetes     09/28/2023 6.2 (H) 4.0 - 5.6 % Final     Comment:     ADA Screening Guidelines:  5.7-6.4%  Consistent with prediabetes  >or=6.5%  Consistent with diabetes    High levels of fetal hemoglobin interfere with the HbA1C  assay. Heterozygous hemoglobin variants (HbS, HgC, etc)do  not significantly interfere with this assay.    However, presence of multiple variants may affect accuracy.             ASSESSMENT/PLAN:    There are no diagnoses linked to this encounter.    Today we discussed at length all of the different treatment options including anti-inflammatories, acetaminophen, rest, ice, heat, physical therapy including strengthening and stretching exercises, home exercises, ROM, aerobic conditioning, aqua therapy, other modalities including ultrasound, massage, and dry needling, epidural steroid injections and finally surgical intervention.      Pt presents with acute cervical radiculopathy. Will send medrol dose pack and gabapentin to pharmacy. Pt is already scheduled for PT at [facility]. She will fu if pain persists, will consider MRI/ESIs             [1]   Current Outpatient Medications on File Prior to Visit   Medication Sig Dispense Refill    betamethasone dipropionate (DIPROLENE) 0.05 % ointment Apply topically 2 (two) times daily. Use to affected areas for up to 2 weeks then take a 1 week break or decrease to 3 times weekly. Do not apply to groin or face. Use to hands 90 g 3    estradioL (ESTRACE) 0.01 % (0.1 mg/gram) vaginal cream  (Patient not taking: Reported on 3/27/2025)      fluticasone propionate (FLONASE) 50 mcg/actuation nasal spray 1 spray (50 mcg total) by Each Nostril route Daily. 48 mL 1    hydroxychloroquine (PLAQUENIL) 200 mg tablet Take 1 tablet (200 mg total) by mouth 2 (two) times daily. 180 tablet 4    levocetirizine (XYZAL) 5 MG tablet TAKE 1 TABLET BY MOUTH EVERY DAY IN THE EVENING 30 tablet 8    magnesium 30 mg Tab Take 1 tablet by mouth Daily.      multivitamin capsule Take 1 capsule by mouth once daily.      naproxen (NAPROSYN) 500 MG tablet Take 1 tablet (500 mg total) by mouth 2 (two) times daily with meals. 20 tablet 0    NIFEdipine (PROCARDIA-XL) 30 MG (OSM) 24 hr tablet Take 1 tablet (30 mg total) by mouth once daily. 90 tablet 3    olmesartan (BENICAR) 5 MG Tab Take 1 tablet (5 mg total) by mouth  once daily. 90 tablet 3    pseudoephedrine-DM-guaiFENesin (POLY-VENT DM) 60- mg Tab Take 1 tablet by mouth every 6 (six) hours as needed. (Patient not taking: Reported on 2/19/2025) 20 tablet 0    rosuvastatin (CRESTOR) 10 MG tablet Take 1 tablet (10 mg total) by mouth once daily. 90 tablet 1    tofacitinib (XELJANZ) 5 mg Tab Take 5 mg by mouth 2 (two) times daily.      vitamin D (VITAMIN D3) 1000 units Tab Take 1,000 Units by mouth once daily.      [DISCONTINUED] mycophenolate (CELLCEPT) 500 mg Tab Take 1 tablet (500 mg total) by mouth once daily. 30 tablet 3     No current facility-administered medications on file prior to visit.   [2]   Social History  Socioeconomic History    Marital status:    Occupational History    Occupation: [employer]   Tobacco Use    Smoking status: Never     Passive exposure: Never    Smokeless tobacco: Never   Substance and Sexual Activity    Alcohol use: Not Currently    Drug use: No    Sexual activity: Yes     Partners: Male     Birth control/protection: None   Other Topics Concern    Are you pregnant or think you may be? No    Breast-feeding No     Social Drivers of Health     Financial Resource Strain: Low Risk  (12/14/2024)    Overall Financial Resource Strain (CARDIA)     Difficulty of Paying Living Expenses: Not very hard   Food Insecurity: No Food Insecurity (12/14/2024)    Hunger Vital Sign     Worried About Running Out of Food in the Last Year: Never true     Ran Out of Food in the Last Year: Never true   Transportation Needs: No Transportation Needs (1/22/2024)    PRAPARE - Transportation     Lack of Transportation (Medical): No     Lack of Transportation (Non-Medical): No   Physical Activity: Insufficiently Active (12/14/2024)    Exercise Vital Sign     Days of Exercise per Week: 3 days     Minutes of Exercise per Session: 30 min   Stress: No Stress Concern Present (12/14/2024)    [employer] - Occupational Stress Questionnaire      Feeling of Stress : Only a little   Housing Stability: Unknown (12/14/2024)    Housing Stability Vital Sign     Unable to Pay for Housing in the Last Year: No

## 2025-04-02 NOTE — PROGRESS NOTES
Patient ID: Ermelinda Verde is a 64 y.o. female.    Chief Complaint: Numbness of the Right Hand    History of Present Illness    CHIEF COMPLAINT:  Numbness and tingling in the right hand    HPI:  Ms. Verde presents with numbness and tingling in the right hand for one month. Her symptoms are intermittent, extending from the shoulder to the right hand, predominantly affecting the ring and long fingers, but also involving the entire hand. The sensation is described as a jolt-like feeling that can occur during the day, depending on activities. At night, symptoms occasionally occur when getting up to use the bathroom, but are temporary. She denies constant nighttime symptoms or being woken up by discomfort. Tylenol is taken for pain management.    Occasional similar symptoms are reported in the left hand, though less severe than the right. She denies specific alleviating or aggravating factors.    She has a history of scoliosis, with the curve located between the shoulder blades. The symptoms are believed to originate from this area, primarily on the right side. She denies neck pain but mentions shoulder discomfort, describing it as a pulling sensation on the affected side, which is attributed to possibly getting older.    She has a history of dermatomyositis and initially thought the symptoms might be related to muscle trouble.    She denies any previous injury or surgery to the right hand.    MEDICATIONS:  Ms. Verde is on Tylenol as needed for pain management.      ROS:  General: denies fever, denies chills, denies fatigue, denies weight gain, denies weight loss  Eyes: denies vision changes, denies redness, denies discharge  ENT: denies ear pain, denies nasal congestion, denies sore throat  Cardiovascular: denies chest pain, denies palpitations, denies lower extremity edema  Respiratory: denies cough, denies shortness of breath  Gastrointestinal: denies abdominal pain, denies nausea, denies vomiting, denies  diarrhea, denies constipation, denies blood in stool  Genitourinary: denies dysuria, denies hematuria, denies frequency  Musculoskeletal: denies joint pain, denies muscle pain, reports pain with movement, reports neck tension, reports neck stiffness  Skin: denies rash, denies lesion  Neurological: denies headache, denies dizziness, reports numbness, reports tingling  Psychiatric: denies anxiety, denies depression, denies sleep difficulty         Physical Exam    Musculoskeletal: Good range of motion of wrists. Good range of motion of elbows bilaterally. Positive Tinel's on the carpal tunnel on the right. Positive Tinel's on the cubital tunnel on the right. No thenar atrophy. Full composite fist bilaterally.         Assessment & Plan     Ordered nerve test to assess for compression in the right upper extremity.   Ordered XR C spine to evaluate for potential arthritis causing nerve impingement.   Conducted Tinel's test at the carpal tunnel and cubital tunnel on the right side.   Performed nerve compression tests for carpal tunnel and cubital tunnel on the right side.   Referred to PT for upper extremity symptoms.   Referred to spine specialists for C spine evaluation.   Follow up after completing nerve test and spine evaluation.              No follow-ups on file.    This note was generated with the assistance of ambient listening technology. Verbal consent was obtained by the patient and accompanying visitor(s) for the recording of patient appointment to facilitate this note. I attest to having reviewed and edited the generated note for accuracy, though some syntax or spelling errors may persist. Please contact the author of this note for any clarification.

## 2025-04-03 ENCOUNTER — OFFICE VISIT (OUTPATIENT)
Dept: ORTHOPEDICS | Facility: CLINIC | Age: 65
End: 2025-04-03
Payer: COMMERCIAL

## 2025-04-03 VITALS — HEIGHT: 65 IN | WEIGHT: 184.06 LBS | BODY MASS INDEX: 30.67 KG/M2

## 2025-04-03 DIAGNOSIS — M54.12 CERVICAL RADICULOPATHY: Primary | ICD-10-CM

## 2025-04-03 PROCEDURE — 99204 OFFICE O/P NEW MOD 45 MIN: CPT | Mod: S$GLB,,, | Performed by: ORTHOPAEDIC SURGERY

## 2025-04-03 PROCEDURE — 3288F FALL RISK ASSESSMENT DOCD: CPT | Mod: CPTII,S$GLB,, | Performed by: ORTHOPAEDIC SURGERY

## 2025-04-03 PROCEDURE — 1101F PT FALLS ASSESS-DOCD LE1/YR: CPT | Mod: CPTII,S$GLB,, | Performed by: ORTHOPAEDIC SURGERY

## 2025-04-03 PROCEDURE — 4010F ACE/ARB THERAPY RXD/TAKEN: CPT | Mod: CPTII,S$GLB,, | Performed by: ORTHOPAEDIC SURGERY

## 2025-04-03 PROCEDURE — 3008F BODY MASS INDEX DOCD: CPT | Mod: CPTII,S$GLB,, | Performed by: ORTHOPAEDIC SURGERY

## 2025-04-03 PROCEDURE — 1125F AMNT PAIN NOTED PAIN PRSNT: CPT | Mod: CPTII,S$GLB,, | Performed by: ORTHOPAEDIC SURGERY

## 2025-04-03 PROCEDURE — 99999 PR PBB SHADOW E&M-EST. PATIENT-LVL III: CPT | Mod: PBBFAC,,, | Performed by: ORTHOPAEDIC SURGERY

## 2025-04-03 PROCEDURE — 1159F MED LIST DOCD IN RCRD: CPT | Mod: CPTII,S$GLB,, | Performed by: ORTHOPAEDIC SURGERY

## 2025-04-03 RX ORDER — METHYLPREDNISOLONE 4 MG/1
TABLET ORAL
Qty: 1 EACH | Refills: 0 | Status: SHIPPED | OUTPATIENT
Start: 2025-04-03 | End: 2025-04-24

## 2025-04-03 RX ORDER — GABAPENTIN 100 MG/1
100 CAPSULE ORAL 3 TIMES DAILY
Qty: 90 CAPSULE | Refills: 11 | Status: SHIPPED | OUTPATIENT
Start: 2025-04-03 | End: 2026-04-03

## 2025-04-07 ENCOUNTER — TELEPHONE (OUTPATIENT)
Dept: PRIMARY CARE CLINIC | Facility: CLINIC | Age: 65
End: 2025-04-07
Payer: COMMERCIAL

## 2025-04-14 ENCOUNTER — TELEPHONE (OUTPATIENT)
Dept: ORTHOPEDICS | Facility: CLINIC | Age: 65
End: 2025-04-14
Payer: COMMERCIAL

## 2025-04-14 DIAGNOSIS — R20.0 NUMBNESS AND TINGLING IN RIGHT HAND: Primary | ICD-10-CM

## 2025-04-14 DIAGNOSIS — R20.2 NUMBNESS AND TINGLING IN RIGHT HAND: Primary | ICD-10-CM

## 2025-04-14 NOTE — TELEPHONE ENCOUNTER
Called and placed an order for OT----- Message from John Delvalle sent at 4/14/2025  1:13 PM CDT -----  Regarding: Needs Referral for  OT  Contact: Lashae   [phone]  Type:  Needs OT ordersWho Called: Lashae calling from [facility] stated that she needs order for  Occupational therapy stated it can be  sent internally due to their fax not working Would the patient rather a call back or a response via [facility]?  Call back Best Call Back Number: Lashae   [phone] Information:

## 2025-04-24 ENCOUNTER — INFUSION (OUTPATIENT)
Dept: INFECTIOUS DISEASES | Facility: HOSPITAL | Age: 65
End: 2025-04-24
Payer: COMMERCIAL

## 2025-04-24 VITALS
TEMPERATURE: 99 F | HEIGHT: 65 IN | BODY MASS INDEX: 30.34 KG/M2 | HEART RATE: 77 BPM | RESPIRATION RATE: 18 BRPM | WEIGHT: 182.13 LBS | OXYGEN SATURATION: 97 % | SYSTOLIC BLOOD PRESSURE: 149 MMHG | DIASTOLIC BLOOD PRESSURE: 88 MMHG

## 2025-04-24 DIAGNOSIS — M33.20 POLYMYOSITIS ASSOCIATED WITH AUTOIMMUNE DISEASE: Primary | ICD-10-CM

## 2025-04-24 DIAGNOSIS — M35.9 POLYMYOSITIS ASSOCIATED WITH AUTOIMMUNE DISEASE: Primary | ICD-10-CM

## 2025-04-24 PROCEDURE — 96366 THER/PROPH/DIAG IV INF ADDON: CPT

## 2025-04-24 PROCEDURE — 96375 TX/PRO/DX INJ NEW DRUG ADDON: CPT

## 2025-04-24 PROCEDURE — 25000003 PHARM REV CODE 250

## 2025-04-24 PROCEDURE — 96365 THER/PROPH/DIAG IV INF INIT: CPT

## 2025-04-24 PROCEDURE — 63600175 PHARM REV CODE 636 W HCPCS: Mod: JZ,TB | Performed by: INTERNAL MEDICINE

## 2025-04-24 RX ORDER — METHYLPREDNISOLONE SOD SUCC 125 MG
125 VIAL (EA) INJECTION
Status: COMPLETED | OUTPATIENT
Start: 2025-04-24 | End: 2025-04-24

## 2025-04-24 RX ORDER — ACETAMINOPHEN 325 MG/1
650 TABLET ORAL
Status: CANCELLED | OUTPATIENT
Start: 2025-04-24

## 2025-04-24 RX ORDER — HEPARIN 100 UNIT/ML
500 SYRINGE INTRAVENOUS
Status: CANCELLED | OUTPATIENT
Start: 2025-04-24

## 2025-04-24 RX ORDER — METHYLPREDNISOLONE SOD SUCC 125 MG
125 VIAL (EA) INJECTION
Status: CANCELLED
Start: 2025-04-24 | End: 2025-04-24

## 2025-04-24 RX ORDER — SODIUM CHLORIDE 0.9 % (FLUSH) 0.9 %
10 SYRINGE (ML) INJECTION
Status: CANCELLED | OUTPATIENT
Start: 2025-04-24

## 2025-04-24 RX ORDER — FAMOTIDINE 10 MG/ML
20 INJECTION, SOLUTION INTRAVENOUS
Status: CANCELLED | OUTPATIENT
Start: 2025-04-24

## 2025-04-24 RX ORDER — DIPHENHYDRAMINE HYDROCHLORIDE 50 MG/ML
25 INJECTION, SOLUTION INTRAMUSCULAR; INTRAVENOUS
Status: CANCELLED | OUTPATIENT
Start: 2025-04-24

## 2025-04-24 RX ADMIN — METHYLPREDNISOLONE SODIUM SUCCINATE 125 MG: 125 INJECTION, POWDER, FOR SOLUTION INTRAMUSCULAR; INTRAVENOUS at 09:04

## 2025-04-24 RX ADMIN — HUMAN IMMUNOGLOBULIN G 85 G: 40 LIQUID INTRAVENOUS at 09:04

## 2025-04-24 RX ADMIN — SODIUM CHLORIDE: 9 INJECTION, SOLUTION INTRAVENOUS at 03:04

## 2025-04-24 NOTE — PROGRESS NOTES
Patient arrived to infusion suite for Privigen 85 g, day 1/2 infusion. Patient stated she took her premed of Tylenol 650 mg PO and Benadryl 25 mg at home and Methylprednisolone 125 mg IVP, given 30 minutes prior to the start of infusion.    Privigen initiated and titrated as ordered with the following rates every 30 minutes: 25 ml/hr, 50 ml/hr, 98 ml/hr and max rate of 198 ml/hr until infusion volume completed. V/S monitored with each titration rate changes. Vital signs stable. Patient tolerated.        Limited head-to-toe assessment due to privacy issues and visit reason though the opportunity was given for patient to express any concerns.

## 2025-04-25 ENCOUNTER — INFUSION (OUTPATIENT)
Dept: INFECTIOUS DISEASES | Facility: HOSPITAL | Age: 65
End: 2025-04-25
Payer: COMMERCIAL

## 2025-04-25 VITALS
BODY MASS INDEX: 30.35 KG/M2 | HEIGHT: 65 IN | TEMPERATURE: 98 F | HEART RATE: 83 BPM | SYSTOLIC BLOOD PRESSURE: 156 MMHG | RESPIRATION RATE: 16 BRPM | WEIGHT: 182.19 LBS | OXYGEN SATURATION: 99 % | DIASTOLIC BLOOD PRESSURE: 86 MMHG

## 2025-04-25 DIAGNOSIS — M33.20 POLYMYOSITIS ASSOCIATED WITH AUTOIMMUNE DISEASE: Primary | ICD-10-CM

## 2025-04-25 DIAGNOSIS — M35.9 POLYMYOSITIS ASSOCIATED WITH AUTOIMMUNE DISEASE: Primary | ICD-10-CM

## 2025-04-25 PROCEDURE — 96375 TX/PRO/DX INJ NEW DRUG ADDON: CPT

## 2025-04-25 PROCEDURE — 63600175 PHARM REV CODE 636 W HCPCS: Mod: JZ,TB | Performed by: INTERNAL MEDICINE

## 2025-04-25 PROCEDURE — 96365 THER/PROPH/DIAG IV INF INIT: CPT

## 2025-04-25 PROCEDURE — 96366 THER/PROPH/DIAG IV INF ADDON: CPT

## 2025-04-25 RX ORDER — METHYLPREDNISOLONE SOD SUCC 125 MG
125 VIAL (EA) INJECTION
Status: COMPLETED | OUTPATIENT
Start: 2025-04-25 | End: 2025-04-25

## 2025-04-25 RX ORDER — DIPHENHYDRAMINE HYDROCHLORIDE 50 MG/ML
25 INJECTION, SOLUTION INTRAMUSCULAR; INTRAVENOUS
OUTPATIENT
Start: 2025-04-25

## 2025-04-25 RX ORDER — HEPARIN 100 UNIT/ML
500 SYRINGE INTRAVENOUS
OUTPATIENT
Start: 2025-04-25

## 2025-04-25 RX ORDER — METHYLPREDNISOLONE SOD SUCC 125 MG
125 VIAL (EA) INJECTION
Start: 2025-04-25 | End: 2025-04-25

## 2025-04-25 RX ORDER — FAMOTIDINE 10 MG/ML
20 INJECTION, SOLUTION INTRAVENOUS
OUTPATIENT
Start: 2025-04-25

## 2025-04-25 RX ORDER — ACETAMINOPHEN 325 MG/1
650 TABLET ORAL
OUTPATIENT
Start: 2025-04-25

## 2025-04-25 RX ORDER — SODIUM CHLORIDE 0.9 % (FLUSH) 0.9 %
10 SYRINGE (ML) INJECTION
Status: DISCONTINUED | OUTPATIENT
Start: 2025-04-25 | End: 2025-04-25 | Stop reason: HOSPADM

## 2025-04-25 RX ORDER — SODIUM CHLORIDE 0.9 % (FLUSH) 0.9 %
10 SYRINGE (ML) INJECTION
OUTPATIENT
Start: 2025-04-25

## 2025-04-25 RX ADMIN — HUMAN IMMUNOGLOBULIN G 85 G: 40 LIQUID INTRAVENOUS at 09:04

## 2025-04-25 RX ADMIN — METHYLPREDNISOLONE SODIUM SUCCINATE 125 MG: 125 INJECTION, POWDER, LYOPHILIZED, FOR SOLUTION INTRAMUSCULAR; INTRAVENOUS at 09:04

## 2025-04-25 NOTE — PROGRESS NOTES
Patient arrived to infusion suite for Privigen 85 g, day 2/2 infusion. Patient stated she took her premed of Tylenol 650 mg PO, Pepcid 20 mg PO and Benadryl 25 mg at home and Methylprednisolone 125 mg IVP, given 30 minutes prior to the start of infusion.    Privigen initiated and titrated as ordered with the following rates every 30 minutes: 25 ml/hr, 50 ml/hr, 99 ml/hr and max rate of 198 ml/hr until infusion volume completed. V/S monitored with each titration rate change and at end of infusion. Vital signs stable. Patient tolerated.        Limited head-to-toe assessment due to privacy issues and visit reason though the opportunity was given for patient to express any concerns.

## 2025-05-08 ENCOUNTER — TELEPHONE (OUTPATIENT)
Dept: ORTHOPEDICS | Facility: CLINIC | Age: 65
End: 2025-05-08
Payer: COMMERCIAL

## 2025-05-08 NOTE — TELEPHONE ENCOUNTER
Spoke to patient regarding rescheduling her EMG. I sent a message to Marco Antonio Woodward to get her scheduled

## 2025-05-09 ENCOUNTER — TELEPHONE (OUTPATIENT)
Dept: NEUROLOGY | Facility: CLINIC | Age: 65
End: 2025-05-09
Payer: COMMERCIAL

## 2025-05-09 ENCOUNTER — PATIENT MESSAGE (OUTPATIENT)
Dept: ORTHOPEDICS | Facility: CLINIC | Age: 65
End: 2025-05-09
Payer: COMMERCIAL

## 2025-05-09 ENCOUNTER — TELEPHONE (OUTPATIENT)
Dept: ORTHOPEDICS | Facility: CLINIC | Age: 65
End: 2025-05-09
Payer: COMMERCIAL

## 2025-05-09 ENCOUNTER — PATIENT MESSAGE (OUTPATIENT)
Dept: NEUROLOGY | Facility: CLINIC | Age: 65
End: 2025-05-09
Payer: COMMERCIAL

## 2025-05-09 NOTE — TELEPHONE ENCOUNTER
"----- Message from Alissa sent at 5/9/2025 11:10 AM CDT -----  Regarding: Appointment  "Type:  Patient Call BackWho Called:PTWhat is the reqeust in detail:Requesting call back to schedule and EMG. Please AdviseCan the clinic reply by [facility]?no Best Call Back Number:[phone] Information:  "

## 2025-05-10 DIAGNOSIS — I70.0 AORTIC ATHEROSCLEROSIS: ICD-10-CM

## 2025-05-10 DIAGNOSIS — E78.5 HYPERLIPIDEMIA, UNSPECIFIED HYPERLIPIDEMIA TYPE: ICD-10-CM

## 2025-05-12 ENCOUNTER — TELEPHONE (OUTPATIENT)
Facility: CLINIC | Age: 65
End: 2025-05-12
Payer: COMMERCIAL

## 2025-05-12 RX ORDER — ROSUVASTATIN CALCIUM 10 MG/1
10 TABLET, COATED ORAL
Qty: 90 TABLET | Refills: 1 | Status: SHIPPED | OUTPATIENT
Start: 2025-05-12

## 2025-05-12 NOTE — TELEPHONE ENCOUNTER
Spoke to patient to inform her that the [facility] did not have any available appointments prior to the June EMG scheduled for her. I did inform her that she could be added to the wait list in case of any cancellations. Patient verbalized understanding and stated that she appreciated the phone call.

## 2025-05-12 NOTE — TELEPHONE ENCOUNTER
Staff spoke to patient asking her if she wanted to come in the clinic tomorrow for 9am or 2pm, Patient declined.

## 2025-05-12 NOTE — TELEPHONE ENCOUNTER
----- Message from JoseWiserTogether sent at 5/8/2025  3:55 PM CDT -----  Type: General Call Back Name of Caller:ptWould the patient rather a call back or a response via [facility]? callAmootoonst Call Back Number:[phone] Additional Information: Pt would like to know if EMGs are also performed at [facility].

## 2025-05-15 ENCOUNTER — TELEPHONE (OUTPATIENT)
Dept: PRIMARY CARE CLINIC | Facility: CLINIC | Age: 65
End: 2025-05-15

## 2025-05-15 ENCOUNTER — OFFICE VISIT (OUTPATIENT)
Dept: PRIMARY CARE CLINIC | Facility: CLINIC | Age: 65
End: 2025-05-15
Payer: MEDICARE

## 2025-05-15 VITALS
HEART RATE: 90 BPM | HEIGHT: 65 IN | BODY MASS INDEX: 30.78 KG/M2 | SYSTOLIC BLOOD PRESSURE: 142 MMHG | TEMPERATURE: 98 F | OXYGEN SATURATION: 99 % | WEIGHT: 184.75 LBS | DIASTOLIC BLOOD PRESSURE: 84 MMHG

## 2025-05-15 DIAGNOSIS — E11.29 MICROALBUMINURIA DUE TO TYPE 2 DIABETES MELLITUS: ICD-10-CM

## 2025-05-15 DIAGNOSIS — R80.9 MICROALBUMINURIA DUE TO TYPE 2 DIABETES MELLITUS: ICD-10-CM

## 2025-05-15 DIAGNOSIS — E78.5 HYPERLIPIDEMIA, UNSPECIFIED HYPERLIPIDEMIA TYPE: ICD-10-CM

## 2025-05-15 DIAGNOSIS — M33.13 DERMATOMYOSITIS: ICD-10-CM

## 2025-05-15 DIAGNOSIS — I10 BENIGN ESSENTIAL HYPERTENSION: Primary | ICD-10-CM

## 2025-05-15 PROCEDURE — 4010F ACE/ARB THERAPY RXD/TAKEN: CPT | Mod: CPTII,S$GLB,, | Performed by: INTERNAL MEDICINE

## 2025-05-15 PROCEDURE — 99999 PR PBB SHADOW E&M-EST. PATIENT-LVL IV: CPT | Mod: PBBFAC,,, | Performed by: INTERNAL MEDICINE

## 2025-05-15 PROCEDURE — 3008F BODY MASS INDEX DOCD: CPT | Mod: CPTII,S$GLB,, | Performed by: INTERNAL MEDICINE

## 2025-05-15 PROCEDURE — 3077F SYST BP >= 140 MM HG: CPT | Mod: CPTII,S$GLB,, | Performed by: INTERNAL MEDICINE

## 2025-05-15 PROCEDURE — 1126F AMNT PAIN NOTED NONE PRSNT: CPT | Mod: CPTII,S$GLB,, | Performed by: INTERNAL MEDICINE

## 2025-05-15 PROCEDURE — 1159F MED LIST DOCD IN RCRD: CPT | Mod: CPTII,S$GLB,, | Performed by: INTERNAL MEDICINE

## 2025-05-15 PROCEDURE — 99214 OFFICE O/P EST MOD 30 MIN: CPT | Mod: S$GLB,,, | Performed by: INTERNAL MEDICINE

## 2025-05-15 PROCEDURE — 3079F DIAST BP 80-89 MM HG: CPT | Mod: CPTII,S$GLB,, | Performed by: INTERNAL MEDICINE

## 2025-05-15 PROCEDURE — G2211 COMPLEX E/M VISIT ADD ON: HCPCS | Mod: S$GLB,,, | Performed by: INTERNAL MEDICINE

## 2025-05-15 PROCEDURE — 1101F PT FALLS ASSESS-DOCD LE1/YR: CPT | Mod: CPTII,S$GLB,, | Performed by: INTERNAL MEDICINE

## 2025-05-15 PROCEDURE — 1160F RVW MEDS BY RX/DR IN RCRD: CPT | Mod: CPTII,S$GLB,, | Performed by: INTERNAL MEDICINE

## 2025-05-15 PROCEDURE — 3288F FALL RISK ASSESSMENT DOCD: CPT | Mod: CPTII,S$GLB,, | Performed by: INTERNAL MEDICINE

## 2025-05-15 NOTE — TELEPHONE ENCOUNTER
Please schedule a nurse visit for Ms. Wadsworth to return in 3wks for BP check and bring bp cuff to validate. Thank you.

## 2025-05-15 NOTE — TELEPHONE ENCOUNTER
----- Message from Reta Weeks MD sent at 5/15/2025  3:42 PM CDT -----  patricia Fall, but I totally told her to come back in in 3 weeks for nurse's visit so her BP cuff can be validated and we can decide if she needs to go up on olmesartan.

## 2025-05-15 NOTE — TELEPHONE ENCOUNTER
Placed call to patient, scheduled nurse visit for bp check and bring in her home bp cuff on 6/5/25 at10 am

## 2025-05-15 NOTE — Clinical Note
Eufemia, sorry, but I totally told her to come back in in 3 weeks for nurse's visit so her BP cuff can be validated and we can decide if she needs to go up on olmesartan.

## 2025-05-15 NOTE — PROGRESS NOTES
Subjective:       Patient ID: Ermelinda Verde is a 64 y.o. female.    Chief Complaint: Follow-up    HPI  Since our last visit in Jan, saw Dr. Campos 2/19/25 for her dermatomyositis (found while she was on chemo for recurrent breast CA) . Continued on xeljanz 5mg qd and started on plaquenil 200mg BID. Added solumedrol 100mg IV to her IVIG q 4 weeks.    Had some R hand numbness/shooting pain sometimes. Saw Dr. Caldera.   Cspine xr 3/28/25 - Spondylotic changes anteriorly from C3 through C7 with mild narrowing of the disc spaces at the C4-5, C5-6 and C6-7 levels.   Undergoing PT.   EMG ordered but not done yet - scheduled in a few mo.   Seen spine ortho. Cont PT. Ordered gabapentin 100mg TID. Feels like PT has helped.   CPK 2/19/25 332. . CRP WNL. Aldolase wnl. AST mildly elevated at 44.    DM2 - diet controlled.   A1c - due  MAC - due  Eye - Dr. Ambriz outside of [facility]. Will be scheduling soon - due at the end of April.  Due for foot exam.     HTN - benicar 5mg daily, procardia xl 30mg daily.   Checks BP at home. SBP at home was in the 120s.    HDL - crestor 10mg daily.   LDL 72 2/3/25    Review of Systems   Constitutional:  Negative for activity change and unexpected weight change.   HENT:  Negative for hearing loss, rhinorrhea and trouble swallowing.    Eyes:  Negative for discharge and visual disturbance.   Respiratory:  Negative for chest tightness and wheezing.    Cardiovascular:  Negative for chest pain and palpitations.   Gastrointestinal:  Positive for constipation. Negative for blood in stool, diarrhea and vomiting.   Endocrine: Negative for polydipsia and polyuria.   Genitourinary:  Negative for difficulty urinating, dysuria, hematuria and menstrual problem.   Musculoskeletal:  Positive for arthralgias. Negative for joint swelling and neck pain.   Neurological:  Negative for weakness and headaches.   Psychiatric/Behavioral:  Negative for confusion and dysphoric mood.          Objective:  "     Physical Exam    BP (!) 142/84   Pulse 90   Temp 97.9 °F (36.6 °C) (Oral)   Ht 5' 5" (1.651 m)   Wt 83.8 kg (184 lb 11.9 oz)   LMP  (LMP Unknown)   SpO2 99%   BMI 30.74 kg/m²     GEN - A+OX4, NAD   HEENT - PERRL, EOMI, OP clear. MMM. TM dullness but no erythema. No sinus tenderness to palpation.   Neck - No cervical LAD. Small thyroid nodule.   CV - RRR, no m/r   Chest - CTAB, no wheezing or rhonchi. Normal work of breathing.  Abd - S/NT/ND/+BS.   Ext - 2+BDP and radial pulses. No LE edema.   Protective Sensation (w/ 10 gram monofilament):  Right: Intact  Left: Intact    Visual Inspection:  Normal -  Bilateral    Pedal Pulses:   Right: Present  Left: Present    Posterior Tibialis Pulses:   Right:Present  Left: Present    Neuro - 5/5 BUE and BLE strength.  MSK - no spinal tenderness to palpation. Scoliosis of the T and L spine.   Skin - taut skin on the neck and face. Tenosynovitis of the hands.      Discussed atherosclerosis on XR and went through her previous labs.     Assessment/Plan     Ermelinda was seen today for follow-up.    Diagnoses and all orders for this visit:    Benign essential hypertension - BP not at goal in clinic. Pt reports BPs have been 120s at home. 3 week f/u w/ nurse to validate her BP at home.   -     Comprehensive Metabolic Panel; Future    Microalbuminuria due to type 2 diabetes mellitus - cont current meds. Consider inc olmesartan.  -     Hemoglobin A1C; Future  -     Microalbumin/Creatinine Ratio, Urine; Future  -     Comprehensive Metabolic Panel; Future    Dermatomyositis - f/u w/ Dr. Campos. Cont current meds. Reminded pt to get eye exam.     Hyperlipidemia, unspecified hyperlipidemia type - cont statin.        Follow up in about 3 months (around 8/15/2025).      Reta Weeks MD  Department of Internal Medicine - [facility]  2:59 PM    "

## 2025-05-20 ENCOUNTER — PATIENT MESSAGE (OUTPATIENT)
Dept: RHEUMATOLOGY | Facility: CLINIC | Age: 65
End: 2025-05-20

## 2025-05-30 ENCOUNTER — TELEPHONE (OUTPATIENT)
Dept: PRIMARY CARE CLINIC | Facility: CLINIC | Age: 65
End: 2025-05-30
Payer: COMMERCIAL

## 2025-05-30 NOTE — TELEPHONE ENCOUNTER
----- Message from Roxanne sent at 5/30/2025  8:34 AM CDT -----  .1MEDICALADVICE Patient is calling for Medical Advice regarding: pt would like a call back to reschedule her nurse visit on 06/05 to earlier in the day. She would like to come on at 9:00 rather than 10:00. Please call to advise How long has patient had these symptoms:Pharmacy name and phone#:Patient wants a call back or thru [facility]:Comments:Please advise patient replies from provider may take up to 48 hours.

## 2025-06-05 ENCOUNTER — CLINICAL SUPPORT (OUTPATIENT)
Dept: PRIMARY CARE CLINIC | Facility: CLINIC | Age: 65
End: 2025-06-05
Payer: MEDICARE

## 2025-06-05 ENCOUNTER — OFFICE VISIT (OUTPATIENT)
Dept: RHEUMATOLOGY | Facility: CLINIC | Age: 65
End: 2025-06-05
Payer: COMMERCIAL

## 2025-06-05 ENCOUNTER — LAB VISIT (OUTPATIENT)
Dept: LAB | Facility: HOSPITAL | Age: 65
End: 2025-06-05
Attending: INTERNAL MEDICINE
Payer: COMMERCIAL

## 2025-06-05 ENCOUNTER — RESULTS FOLLOW-UP (OUTPATIENT)
Dept: PRIMARY CARE CLINIC | Facility: CLINIC | Age: 65
End: 2025-06-05

## 2025-06-05 VITALS
BODY MASS INDEX: 30.56 KG/M2 | DIASTOLIC BLOOD PRESSURE: 81 MMHG | WEIGHT: 183.63 LBS | SYSTOLIC BLOOD PRESSURE: 137 MMHG | HEART RATE: 79 BPM

## 2025-06-05 VITALS
HEART RATE: 80 BPM | RESPIRATION RATE: 18 BRPM | OXYGEN SATURATION: 97 % | SYSTOLIC BLOOD PRESSURE: 123 MMHG | DIASTOLIC BLOOD PRESSURE: 72 MMHG | TEMPERATURE: 98 F

## 2025-06-05 DIAGNOSIS — R20.0 NUMBNESS AND TINGLING IN RIGHT HAND: ICD-10-CM

## 2025-06-05 DIAGNOSIS — E11.29 MICROALBUMINURIA DUE TO TYPE 2 DIABETES MELLITUS: ICD-10-CM

## 2025-06-05 DIAGNOSIS — R80.9 MICROALBUMINURIA DUE TO TYPE 2 DIABETES MELLITUS: ICD-10-CM

## 2025-06-05 DIAGNOSIS — D84.821 IMMUNODEFICIENCY DUE TO DRUG THERAPY: ICD-10-CM

## 2025-06-05 DIAGNOSIS — I10 BENIGN ESSENTIAL HYPERTENSION: ICD-10-CM

## 2025-06-05 DIAGNOSIS — I10 BENIGN ESSENTIAL HYPERTENSION: Primary | ICD-10-CM

## 2025-06-05 DIAGNOSIS — M33.13 DERMATOMYOSITIS: Primary | ICD-10-CM

## 2025-06-05 DIAGNOSIS — R20.2 NUMBNESS AND TINGLING IN RIGHT HAND: ICD-10-CM

## 2025-06-05 DIAGNOSIS — D84.821 IMMUNODEFICIENCY DUE TO DRUG THERAPY: Primary | ICD-10-CM

## 2025-06-05 DIAGNOSIS — M33.13 DERMATOMYOSITIS: ICD-10-CM

## 2025-06-05 DIAGNOSIS — Z79.899 IMMUNODEFICIENCY DUE TO DRUG THERAPY: ICD-10-CM

## 2025-06-05 DIAGNOSIS — Z79.899 IMMUNODEFICIENCY DUE TO DRUG THERAPY: Primary | ICD-10-CM

## 2025-06-05 LAB
ABSOLUTE EOSINOPHIL (OHS): 0.14 K/UL
ABSOLUTE MONOCYTE (OHS): 0.61 K/UL (ref 0.3–1)
ABSOLUTE NEUTROPHIL COUNT (OHS): 1.94 K/UL (ref 1.8–7.7)
ALBUMIN SERPL BCP-MCNC: 4.2 G/DL (ref 3.5–5.2)
ALDOLASE (OHS): 3.3 U/L (ref 1.2–7.6)
ALP SERPL-CCNC: 93 UNIT/L (ref 40–150)
ALT SERPL W/O P-5'-P-CCNC: 19 UNIT/L (ref 10–44)
ANION GAP (OHS): 6 MMOL/L (ref 8–16)
AST SERPL-CCNC: 29 UNIT/L (ref 11–45)
BASOPHILS # BLD AUTO: 0.04 K/UL
BASOPHILS NFR BLD AUTO: 0.7 %
BILIRUB SERPL-MCNC: 0.3 MG/DL (ref 0.1–1)
BUN SERPL-MCNC: 17 MG/DL (ref 8–23)
CALCIUM SERPL-MCNC: 9.8 MG/DL (ref 8.7–10.5)
CHLORIDE SERPL-SCNC: 105 MMOL/L (ref 95–110)
CK SERPL-CCNC: 222 U/L (ref 20–180)
CO2 SERPL-SCNC: 27 MMOL/L (ref 23–29)
CREAT SERPL-MCNC: 1.1 MG/DL (ref 0.5–1.4)
CRP SERPL-MCNC: 3.6 MG/L
EAG (OHS): 131 MG/DL (ref 68–131)
ERYTHROCYTE [DISTWIDTH] IN BLOOD BY AUTOMATED COUNT: 15.2 % (ref 11.5–14.5)
ERYTHROCYTE [SEDIMENTATION RATE] IN BLOOD BY PHOTOMETRIC METHOD: 44 MM/HR
GFR SERPLBLD CREATININE-BSD FMLA CKD-EPI: 56 ML/MIN/1.73/M2
GLUCOSE SERPL-MCNC: 82 MG/DL (ref 70–110)
HBA1C MFR BLD: 6.2 % (ref 4–5.6)
HCT VFR BLD AUTO: 40 % (ref 37–48.5)
HGB BLD-MCNC: 12 GM/DL (ref 12–16)
IMM GRANULOCYTES # BLD AUTO: 0.01 K/UL (ref 0–0.04)
IMM GRANULOCYTES NFR BLD AUTO: 0.2 % (ref 0–0.5)
LYMPHOCYTES # BLD AUTO: 3.29 K/UL (ref 1–4.8)
MCH RBC QN AUTO: 26.9 PG (ref 27–31)
MCHC RBC AUTO-ENTMCNC: 30 G/DL (ref 32–36)
MCV RBC AUTO: 90 FL (ref 82–98)
NUCLEATED RBC (/100WBC) (OHS): 0 /100 WBC
PLATELET # BLD AUTO: 287 K/UL (ref 150–450)
PMV BLD AUTO: 9.5 FL (ref 9.2–12.9)
POTASSIUM SERPL-SCNC: 4.6 MMOL/L (ref 3.5–5.1)
PROT SERPL-MCNC: 9.5 GM/DL (ref 6–8.4)
RBC # BLD AUTO: 4.46 M/UL (ref 4–5.4)
RELATIVE EOSINOPHIL (OHS): 2.3 %
RELATIVE LYMPHOCYTE (OHS): 54.6 % (ref 18–48)
RELATIVE MONOCYTE (OHS): 10.1 % (ref 4–15)
RELATIVE NEUTROPHIL (OHS): 32.1 % (ref 38–73)
SODIUM SERPL-SCNC: 138 MMOL/L (ref 136–145)
WBC # BLD AUTO: 6.03 K/UL (ref 3.9–12.7)

## 2025-06-05 PROCEDURE — 3044F HG A1C LEVEL LT 7.0%: CPT | Mod: CPTII,S$GLB,, | Performed by: INTERNAL MEDICINE

## 2025-06-05 PROCEDURE — 85025 COMPLETE CBC W/AUTO DIFF WBC: CPT

## 2025-06-05 PROCEDURE — 1101F PT FALLS ASSESS-DOCD LE1/YR: CPT | Mod: CPTII,S$GLB,, | Performed by: INTERNAL MEDICINE

## 2025-06-05 PROCEDURE — 83036 HEMOGLOBIN GLYCOSYLATED A1C: CPT

## 2025-06-05 PROCEDURE — 85652 RBC SED RATE AUTOMATED: CPT

## 2025-06-05 PROCEDURE — 1159F MED LIST DOCD IN RCRD: CPT | Mod: CPTII,S$GLB,, | Performed by: INTERNAL MEDICINE

## 2025-06-05 PROCEDURE — 82550 ASSAY OF CK (CPK): CPT

## 2025-06-05 PROCEDURE — 3288F FALL RISK ASSESSMENT DOCD: CPT | Mod: CPTII,S$GLB,, | Performed by: INTERNAL MEDICINE

## 2025-06-05 PROCEDURE — 82085 ASSAY OF ALDOLASE: CPT

## 2025-06-05 PROCEDURE — 4010F ACE/ARB THERAPY RXD/TAKEN: CPT | Mod: CPTII,S$GLB,, | Performed by: INTERNAL MEDICINE

## 2025-06-05 PROCEDURE — 99999 PR PBB SHADOW E&M-EST. PATIENT-LVL III: CPT | Mod: PBBFAC,,,

## 2025-06-05 PROCEDURE — 82040 ASSAY OF SERUM ALBUMIN: CPT

## 2025-06-05 PROCEDURE — 3008F BODY MASS INDEX DOCD: CPT | Mod: CPTII,S$GLB,, | Performed by: INTERNAL MEDICINE

## 2025-06-05 PROCEDURE — 36415 COLL VENOUS BLD VENIPUNCTURE: CPT

## 2025-06-05 PROCEDURE — 3075F SYST BP GE 130 - 139MM HG: CPT | Mod: CPTII,S$GLB,, | Performed by: INTERNAL MEDICINE

## 2025-06-05 PROCEDURE — 99999 PR PBB SHADOW E&M-EST. PATIENT-LVL III: CPT | Mod: PBBFAC,,, | Performed by: INTERNAL MEDICINE

## 2025-06-05 PROCEDURE — 3079F DIAST BP 80-89 MM HG: CPT | Mod: CPTII,S$GLB,, | Performed by: INTERNAL MEDICINE

## 2025-06-05 PROCEDURE — 86140 C-REACTIVE PROTEIN: CPT

## 2025-06-05 PROCEDURE — 99215 OFFICE O/P EST HI 40 MIN: CPT | Mod: S$GLB,,, | Performed by: INTERNAL MEDICINE

## 2025-06-05 PROCEDURE — 1126F AMNT PAIN NOTED NONE PRSNT: CPT | Mod: CPTII,S$GLB,, | Performed by: INTERNAL MEDICINE

## 2025-06-05 RX ORDER — ETODOLAC 400 MG/1
TABLET, FILM COATED ORAL
COMMUNITY
Start: 2025-06-04

## 2025-06-05 RX ORDER — AMOXICILLIN 500 MG/1
500 CAPSULE ORAL 3 TIMES DAILY
COMMUNITY
Start: 2025-06-04

## 2025-06-05 NOTE — PROGRESS NOTES
"Subjective:      Patient ID: Ermelinda Verde is a 63 y.o. female.    Chief Complaint: No chief complaint on file.     HPI  Initial HPI:  Ermelinda Verdeis a 63 y.o.F with history of L sided infiltrating ductal carcinoma of the L breast (dx on Jan 2017), HTN, depression, gastritis, dermatomyositis  here to establish care for dermatomyositis. Patient was hospitalized from 1/22/19 till 2/13/19 for myositis. On 1/22/18 patient with c/o proximal muscle weakness. CPK found to be elevated around 4k.   During her hospitalization patient was started on high dose steroids and IV IG over 5 days.   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    Patient started on MTX 20mg SQ  with folic acid. MTX discontinued 2/18 with concern of toxicity (decrease blood counts and transaminatis). Failed Cellcept - worsening leukopenia, elevated LFTs, and other side effects without improvement of symptoms   In 2020, she had 2 Rituxan infusion and had flare of skin  so it was stopped.  She has been on IVIG monthly which has controlled her weakness. Taking xeljanz - 5 mg daily.   She is IVIG 2 G/KG BW EVERY 4 WEEKS.  She reports that she thinks her skin is overall doing "fine".        Interval history: Last IV IG infusion was in April.    she reports  that her skin is flaring.  She has itchiness of her lesions and they hurt in her dips.  She has redness around the eyes.      Past Medical History:   Diagnosis Date    Anemia 2019    Anxiety 2018    Breast cancer 01/01/2017    left    Controlled type 2 diabetes mellitus without complication, without long-term current use of insulin 5/16/2023    Depression     Dermatomyositis     Diverticulosis     Erosive esophagitis     Gastritis     Hepatic cyst     Hypertension     Immune disorder 2019    Joint pain 2019    Keloid cicatrix 2005    Thyroiditis     Vitamin B12 deficiency 3/8/2018       Review of Systems   Constitutional:  Negative for fever and unexpected weight change.   HENT:  " Negative for mouth sores and trouble swallowing.    Eyes:  Negative for redness.   Respiratory:  Negative for cough and shortness of breath.    Cardiovascular:  Negative for chest pain.   Gastrointestinal:  Positive for constipation. Negative for diarrhea.   Genitourinary:  Negative for dysuria and genital sores.   Skin:  Negative for rash.   Neurological:  Negative for headaches.   Hematological:  Does not bruise/bleed easily.    see HPI      Objective:   LMP  (LMP Unknown)   Physical Exam   Constitutional: She is oriented to person, place, and time.   HENT:   Head: Normocephalic and atraumatic.   Right Ear: External ear normal.   Left Ear: External ear normal.   Nose: Nose normal.   Mouth/Throat: Oropharynx is clear and moist. No oropharyngeal exudate.   Eyes: Pupils are equal, round, and reactive to light. Conjunctivae are normal. Right eye exhibits no discharge. Left eye exhibits no discharge. No scleral icterus.   Neck: No JVD present. No thyromegaly present.   Cardiovascular: Normal rate, regular rhythm and normal heart sounds. Exam reveals no gallop and no friction rub.   No murmur heard.  Pulmonary/Chest: Effort normal and breath sounds normal. No respiratory distress. She has no wheezes. She has no rales. She exhibits no tenderness.   Abdominal: Soft. Bowel sounds are normal. She exhibits no distension and no mass. There is no abdominal tenderness. There is no rebound and no guarding.   Musculoskeletal:         General: No swelling, tenderness or deformity.      Cervical back: Neck supple.   Lymphadenopathy:     She has no cervical adenopathy.   Neurological: She is alert and oriented to person, place, and time. No cranial nerve deficit. Gait normal. Coordination normal.   Skin: Skin is dry. Rash noted. No erythema. No pallor.   Psychiatric: Affect and judgment normal.          No data to display     TIF1 gamma elevated at 25 (<20 units)  Anti SSA elevated at 23 (<20 units)   1/2019:   Assessment:    64   "y.o.F with history of L sided infiltrating ductal carcinoma of the L breast (dx on Jan 2017), HTN, depression, gastritis, dermatomyositis  here to establish care for dermatomyositis.  Patient previously following with Dr. RAY    #Dermatomyositis: she initially presented with heliotrophe, facial rash, Gottron's papules) as well as a degree vasculopathic disease (nailfold vascular changes, evidence digital pulp ulcers/lesions. Skin bx consistent with DM. She initially had muscle involvement which has been well controlled on monthly Iv IG.  Per derm "    Patient appears to have 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 No need for addition of systemic steroids with controlled myopathy and otherwise absence of systemic disease due to lack of efficacy in controlling cutaneous disease, side effect profile.   Currently with evidence of both nonvasculopathic cutaneous disease (heliotrophe, facial rash, Gottron's papules) as well as a degree vasculopathic disease (nailfold vascular changes, evidence digital pulp ulcers/lesions, associated pain at these locations)."  She is flaring form skin so will add steroid IV to her monthly infusion and will switch her to receive IV IG over 2 days    -continue Xelganz 5 mg po qday  Start plaquenil 200mg po BID (Risks of starting plaquenil discussed. Risks include eye toxicity and agrees on timely follow up with optho to avoid risks of eye toxicity. Other risks include rashes such has hyperpigmentation and vertigo.    - failed cellcept and had cytopenias  Avoiding azathioprine due to status as immediate metabolizer based on TPMT   wean Solumedrol  beiv100at IV  to 80mg with her  IV IG infusions  Labs every 3 months   #right hand numbness: suspect CTS  Hand clinic consult placed at last visit   # abnormal CT chest: repeat again      Immunodeficiency due to drug-   -monitor carefully for infection and any toxicities " associated with immunosuppressants  -advised age appropriate cancer screenings including yearly skin exam and age appropriate vaccinations  -advised to seek immediate care in the setting of infection and hold immunosuppressive medications if there is infection      45* minutes of total time spent on the encounter, which includes face to face time and non-face to face time preparing to see the patient (eg, review of tests), Obtaining and/or reviewing separately obtained history, Documenting clinical information in the electronic or other health record, Independently interpreting results (not separately reported) and communicating results to the patient/family/caregiver, or Care coordination (not separately reported).     Rtc in 3  months

## 2025-06-05 NOTE — PROGRESS NOTES
"Subjective:      Patient ID: Ermelinda Verde is a 63 y.o. female.    Chief Complaint: No chief complaint on file.     HPI  Initial HPI:  Ermelinda Vedreis a 63 y.o.F with history of L sided infiltrating ductal carcinoma of the L breast (dx on Jan 2017), HTN, depression, gastritis, dermatomyositis  here to establish care for dermatomyositis. Patient was hospitalized from 1/22/19 till 2/13/19 for myositis. On 1/22/18 patient with c/o proximal muscle weakness. CPK found to be elevated around 4k.   During her hospitalization patient was started on high dose steroids and IV IG over 5 days.   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    Patient started on MTX 20mg SQ  with folic acid. MTX discontinued 2/18 with concern of toxicity (decrease blood counts and transaminatis). Failed Cellcept - worsening leukopenia, elevated LFTs, and other side effects without improvement of symptoms   In 2020, she had 2 Rituxan infusion and had flare of skin  so it was stopped.  She has been on IVIG monthly which has controlled her weakness. Taking xeljanz - 5 mg daily.   She is IVIG 2 G/KG BW EVERY 4 WEEKS.  She reports that she thinks her skin is overall doing "fine".        Interval history:  Missed Iv IG in May.  She took it  over 2 days.   She has itchiness of her lesions and pain around dips.She has redness around the eyes.      Past Medical History:   Diagnosis Date    Anemia 2019    Anxiety 2018    Breast cancer 01/01/2017    left    Controlled type 2 diabetes mellitus without complication, without long-term current use of insulin 5/16/2023    Depression     Dermatomyositis     Diverticulosis     Erosive esophagitis     Gastritis     Hepatic cyst     Hypertension     Immune disorder 2019    Joint pain 2019    Keloid cicatrix 2005    Thyroiditis     Vitamin B12 deficiency 3/8/2018       Review of Systems   Constitutional:  Negative for fever and unexpected weight change.   HENT:  Negative for mouth sores and trouble " swallowing.    Eyes:  Negative for redness.   Respiratory:  Negative for cough and shortness of breath.    Cardiovascular:  Negative for chest pain.   Gastrointestinal:  Positive for constipation. Negative for diarrhea.   Genitourinary:  Negative for dysuria and genital sores.   Skin:  Negative for rash.   Neurological:  Negative for headaches.   Hematological:  Does not bruise/bleed easily.    see HPI      Objective:   LMP  (LMP Unknown)   Physical Exam   Constitutional: She is oriented to person, place, and time.   HENT:   Head: Normocephalic and atraumatic.   Right Ear: External ear normal.   Left Ear: External ear normal.   Nose: Nose normal.   Mouth/Throat: Oropharynx is clear and moist. No oropharyngeal exudate.   Eyes: Pupils are equal, round, and reactive to light. Conjunctivae are normal. Right eye exhibits no discharge. Left eye exhibits no discharge. No scleral icterus.   Neck: No JVD present. No thyromegaly present.   Cardiovascular: Normal rate, regular rhythm and normal heart sounds. Exam reveals no gallop and no friction rub.   No murmur heard.  Pulmonary/Chest: Effort normal and breath sounds normal. No respiratory distress. She has no wheezes. She has no rales. She exhibits no tenderness.   Abdominal: Soft. Bowel sounds are normal. She exhibits no distension and no mass. There is no abdominal tenderness. There is no rebound and no guarding.   Musculoskeletal:         General: No swelling, tenderness or deformity.      Cervical back: Neck supple.   Lymphadenopathy:     She has no cervical adenopathy.   Neurological: She is alert and oriented to person, place, and time. No cranial nerve deficit. Gait normal. Coordination normal.   Skin: Skin is dry. Rash noted. No erythema. No pallor.   Psychiatric: Affect and judgment normal.          No data to display     TIF1 gamma elevated at 25 (<20 units)  Anti SSA elevated at 23 (<20 units)   1/2019:   Assessment:    64  y.o.F with history of L sided  "infiltrating ductal carcinoma of the L breast (dx on Jan 2017), HTN, depression, gastritis, dermatomyositis  here to establish care for dermatomyositis.  Patient previously following with Dr. RAY    #Dermatomyositis: she initially presented with heliotrophe, facial rash, Gottron's papules) as well as a degree vasculopathic disease (nailfold vascular changes, evidence digital pulp ulcers/lesions. Skin bx consistent with DM. She initially had muscle involvement which has been well controlled on monthly Iv IG.  Per derm "    Patient appears to have 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 Currently with evidence of both nonvasculopathic cutaneous disease (heliotrophe, facial rash, Gottron's papules) as well as a degree vasculopathic disease (nailfold vascular changes, evidence digital pulp ulcers/lesions, associated pain at these locations)."  -increase  Xelganz 5 mg  po qday to 5mg alternating with  5mg po BID daily (plan to eventually increase to 5 mg po BID)  (If cannot tolerate higher xelganz dose, consider putting her on prednisone 5 mg a day)  Continue  plaquenil 200mg po BID   Optometry consult  - failed cellcept and had cytopenias  Avoiding azathioprine due to status as immediate metabolizer based on TPMT   Continue Solumedrol 125 mg IV with her IV  IG infusions  Labs  4 weeks after increasing dose of xelganz       #right hand numbness: suspect CTS  Hand clinic consult placed last consult   # abnormal CT chest: repeat again      Immunodeficiency due to drug-   -monitor carefully for infection and any toxicities associated with immunosuppressants  -advised age appropriate cancer screenings including yearly skin exam and age appropriate vaccinations  -advised to seek immediate care in the setting of infection and hold immunosuppressive medications if there is infection      45* minutes of total time spent on the encounter, which includes " face to face time and non-face to face time preparing to see the patient (eg, review of tests), Obtaining and/or reviewing separately obtained history, Documenting clinical information in the electronic or other health record, Independently interpreting results (not separately reported) and communicating results to the patient/family/caregiver, or Care coordination (not separately reported).     Rtc in 3  months

## 2025-06-05 NOTE — PROGRESS NOTES
6/4/2025    12:32 PM   Rapid3 Question Responses and Scores   MDHAQ Score 0.3   Psychologic Score 0   Pain Score 1   When you awakened in the morning OVER THE LAST WEEK, did you feel stiff? Yes   If Yes, please indicate the number of hours until you are as limber as you will be for the day 2   Fatigue Score 2   Global Health Score 2   RAPID3 Score 1.33     Answers submitted by the patient for this visit:  Rheumatology Questionnaire (Submitted on 6/4/2025)  fever: No  eye redness: No  mouth sores: No  headaches: No  shortness of breath: No  chest pain: No  trouble swallowing: No  diarrhea: No  constipation: Yes  unexpected weight change: No  genital sore: No  dysuria: No  During the last 3 days, have you had a skin rash?: No  Bruises or bleeds easily: No  cough: No

## 2025-06-07 DIAGNOSIS — I10 BENIGN ESSENTIAL HYPERTENSION: ICD-10-CM

## 2025-06-08 ENCOUNTER — PATIENT MESSAGE (OUTPATIENT)
Dept: RHEUMATOLOGY | Facility: CLINIC | Age: 65
End: 2025-06-08
Payer: COMMERCIAL

## 2025-06-08 DIAGNOSIS — Z79.899 LONG-TERM USE OF PLAQUENIL: Primary | ICD-10-CM

## 2025-06-08 DIAGNOSIS — M33.13 DERMATOMYOSITIS: ICD-10-CM

## 2025-06-10 RX ORDER — NIFEDIPINE 30 MG/1
30 TABLET, EXTENDED RELEASE ORAL
Qty: 90 TABLET | Refills: 3 | Status: SHIPPED | OUTPATIENT
Start: 2025-06-10

## 2025-06-11 DIAGNOSIS — M33.13 DERMATOMYOSITIS: ICD-10-CM

## 2025-06-12 ENCOUNTER — HOSPITAL ENCOUNTER (OUTPATIENT)
Dept: RADIOLOGY | Facility: HOSPITAL | Age: 65
Discharge: HOME | End: 2025-06-12
Attending: INTERNAL MEDICINE
Payer: COMMERCIAL

## 2025-06-12 DIAGNOSIS — R05.9 COUGH, UNSPECIFIED TYPE: ICD-10-CM

## 2025-06-12 DIAGNOSIS — R59.0 LOCALIZED ENLARGED LYMPH NODES: ICD-10-CM

## 2025-06-12 PROCEDURE — 71250 CT THORAX DX C-: CPT | Mod: TC

## 2025-06-16 ENCOUNTER — PROCEDURE VISIT (OUTPATIENT)
Dept: NEUROLOGY | Facility: CLINIC | Age: 65
End: 2025-06-16
Payer: COMMERCIAL

## 2025-06-16 DIAGNOSIS — M33.13 DERMATOMYOSITIS: ICD-10-CM

## 2025-06-16 DIAGNOSIS — R20.2 NUMBNESS AND TINGLING IN RIGHT HAND: ICD-10-CM

## 2025-06-16 DIAGNOSIS — R20.0 NUMBNESS AND TINGLING IN RIGHT HAND: ICD-10-CM

## 2025-06-16 PROCEDURE — 95886 MUSC TEST DONE W/N TEST COMP: CPT | Mod: S$GLB,,, | Performed by: PHYSICAL MEDICINE & REHABILITATION

## 2025-06-16 PROCEDURE — 95911 NRV CNDJ TEST 9-10 STUDIES: CPT | Mod: S$GLB,,, | Performed by: PHYSICAL MEDICINE & REHABILITATION

## 2025-06-16 PROCEDURE — 95885 MUSC TST DONE W/NERV TST LIM: CPT | Mod: 59,S$GLB,, | Performed by: PHYSICAL MEDICINE & REHABILITATION

## 2025-06-16 NOTE — PROCEDURES
Test Date:  2025    Patient: ermelinda garcia : 1960 Physician: Ulysses Marvin D.O.   ID#: [id number] Sex: Female Ref. Phys: Fartun Caldera, *     HPI: Ermelinda Garcia is a 64 y.o.female who presents for NCS/EMG to evaluate for CTS bilaterally.     PROCEDURE:  Prior to the procedure, the procedure was discussed in detail with the patient.  All questions were answered, and verbal consent was obtained.  For nerve conduction studies, a combination of surface electrodes, bar electrodes, and/or ring electrodes were used as needed.  For needle EMG, each site was cleaned and prepped in usual fashion with an alcohol pad.  A monopolar needle (28G) was used.  There was no significant bleeding, and bandages were applied as needed.  The procedure was tolerated without adverse effect.  The patient was instructed on post-procedure care including ice if needed for 10-15 minutes up to 4 times/day for any sore muscles.  I discussed with the patient that the data would be reviewed and a report sent to the referring provider, where any follow up questions regarding next steps should be directed.        NCV & EMG Findings:  Evaluation of the left median motor nerve showed prolonged distal onset latency.  The right median motor nerve showed prolonged distal onset latency and decreased conduction velocity.  The left median sensory and the right median sensory nerves showed prolonged distal onset latency, prolonged distal peak latency, and decreased conduction velocity.  All remaining nerves (as indicated in the following tables) were within normal limits.  Needle evaluation of the right Abductor Pollicis Brevis muscle showed increased motor unit amplitude and increased motor unit duration.  All remaining muscles (as indicated in the following table) showed no evidence of electrical instability.      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 Carpal tunnel syndrome).  There is no motor axonal loss.  There is no active denervation.  This appears chronic on the right.  This is graded as Severe in severity on the right, and Moderate on the left.          ___________________________  Ulysses Marvin D.O.        NCS+  Motor Nerve Results      Latency Amplitude F-Lat Segment Distance CV Comment   Site (ms) Norm (mV) Norm (ms)  (cm) (m/s) Norm    Left Median (APB)   Wrist *5.8  < 4.4 11.1  > 3.8         Elbow 10.5 - 10.1 -  Elbow-Wrist 25 53  > 51    Right Median (APB)   Wrist *7.5  < 4.4 7.2  > 3.8         Elbow 12.5 - 5.7 -  Elbow-Wrist 23 *46  > 51    Left Ulnar (ADM)   Wrist 3.1  < 3.7 9.4  > 3.0         Bel Elbow 7.6 - 9.6 -  Bel Elbow-Wrist 26 58  > 52    Abv Elbow 8.8 - 9.7 -  Abv Elbow-Bel Elbow 9 71  > 43    Right Ulnar (ADM)   Wrist 3.1  < 3.7 9.9  > 3.0         Bel Elbow 7.5 - 9.3 -  Bel Elbow-Wrist 26 59  > 52    Abv Elbow 8.9 - 8.8 -  Abv Elbow-Bel Elbow 9 63  > 43      Sensory Sites      Latency (O) Latency (P) Amp (P-T) Segment Distance Velocity Comment   Site (ms) Norm (ms) Norm (µV) Norm  (cm) (m/s)    Left Median (Rec:Dig II)   Wrist *5.0  < 3.3 *6.3  < 4.0 13  > 8 Wrist-Dig II 14 *28    Right Median (Rec:Dig II)   Wrist *4.9  < 3.3 *6.0  < 4.0 14  > 8 Wrist-Dig II 14 *29    Left Ulnar (Rec:Dig V)   Wrist 2.4  < 3.1 3.2  < 4.0 29  > 4 Wrist-Dig V 14 59    Right Ulnar (Rec:Dig V)   Wrist 2.6  < 3.1 3.3  < 4.0 14  > 4 Wrist-Dig V 14 54    Right Radial (Rec:Wrist)   Forearm 1.15  < 2.2 2.2  < 2.8 18  > 11 Forearm-Wrist 10 87      EMG+     Side Muscle Nerve Root Ins Act Fibs Psw Amp Dur Poly Recrt Int Pat Comment   Right Deltoid Axillary C5-C6 Nml Nml Nml Nml Nml 0 Nml Nml    Right Biceps Musculocut C5-C6 Nml Nml Nml Nml Nml 0 Nml Nml    Right Triceps Radial C6-C8 Nml Nml Nml Nml Nml 0 Nml Nml    Right Pronator Teres Median C6-C7 Nml Nml Nml Nml Nml 0 Nml Nml    Right APB Median C8-T1 Nml Nml Nml *Incr *>12ms 0 Nml Nml    Left APB  Median C8-T1 Nml Nml Nml Nml Nml 0 Nml Nml            Waveforms:    Motor              Sensory

## 2025-06-18 ENCOUNTER — INFUSION (OUTPATIENT)
Dept: INFECTIOUS DISEASES | Facility: HOSPITAL | Age: 65
End: 2025-06-18
Payer: COMMERCIAL

## 2025-06-18 VITALS
BODY MASS INDEX: 30.71 KG/M2 | DIASTOLIC BLOOD PRESSURE: 84 MMHG | RESPIRATION RATE: 20 BRPM | WEIGHT: 184.31 LBS | SYSTOLIC BLOOD PRESSURE: 150 MMHG | HEIGHT: 65 IN | OXYGEN SATURATION: 98 % | TEMPERATURE: 98 F | HEART RATE: 90 BPM

## 2025-06-18 DIAGNOSIS — M35.9 POLYMYOSITIS ASSOCIATED WITH AUTOIMMUNE DISEASE: Primary | ICD-10-CM

## 2025-06-18 DIAGNOSIS — M33.20 POLYMYOSITIS ASSOCIATED WITH AUTOIMMUNE DISEASE: Primary | ICD-10-CM

## 2025-06-18 PROCEDURE — 96365 THER/PROPH/DIAG IV INF INIT: CPT

## 2025-06-18 PROCEDURE — 96375 TX/PRO/DX INJ NEW DRUG ADDON: CPT

## 2025-06-18 PROCEDURE — 96366 THER/PROPH/DIAG IV INF ADDON: CPT

## 2025-06-18 PROCEDURE — 63600175 PHARM REV CODE 636 W HCPCS: Mod: JZ,TB | Performed by: INTERNAL MEDICINE

## 2025-06-18 RX ORDER — DIPHENHYDRAMINE HYDROCHLORIDE 50 MG/ML
25 INJECTION, SOLUTION INTRAMUSCULAR; INTRAVENOUS
Status: CANCELLED | OUTPATIENT
Start: 2025-06-18

## 2025-06-18 RX ORDER — METHYLPREDNISOLONE SOD SUCC 125 MG
125 VIAL (EA) INJECTION
Status: CANCELLED
Start: 2025-06-18 | End: 2025-06-18

## 2025-06-18 RX ORDER — METHYLPREDNISOLONE SOD SUCC 125 MG
125 VIAL (EA) INJECTION
Status: COMPLETED | OUTPATIENT
Start: 2025-06-18 | End: 2025-06-18

## 2025-06-18 RX ORDER — ACETAMINOPHEN 325 MG/1
650 TABLET ORAL
Status: CANCELLED | OUTPATIENT
Start: 2025-06-18

## 2025-06-18 RX ORDER — FAMOTIDINE 10 MG/ML
20 INJECTION, SOLUTION INTRAVENOUS
Status: DISCONTINUED | OUTPATIENT
Start: 2025-06-18 | End: 2025-06-18 | Stop reason: HOSPADM

## 2025-06-18 RX ORDER — HEPARIN 100 UNIT/ML
500 SYRINGE INTRAVENOUS
Status: CANCELLED | OUTPATIENT
Start: 2025-06-18

## 2025-06-18 RX ORDER — SODIUM CHLORIDE 0.9 % (FLUSH) 0.9 %
10 SYRINGE (ML) INJECTION
Status: DISCONTINUED | OUTPATIENT
Start: 2025-06-18 | End: 2025-06-18 | Stop reason: HOSPADM

## 2025-06-18 RX ORDER — ACETAMINOPHEN 325 MG/1
650 TABLET ORAL
Status: DISCONTINUED | OUTPATIENT
Start: 2025-06-18 | End: 2025-06-18 | Stop reason: HOSPADM

## 2025-06-18 RX ORDER — DIPHENHYDRAMINE HYDROCHLORIDE 50 MG/ML
25 INJECTION, SOLUTION INTRAMUSCULAR; INTRAVENOUS
Status: DISCONTINUED | OUTPATIENT
Start: 2025-06-18 | End: 2025-06-18 | Stop reason: HOSPADM

## 2025-06-18 RX ORDER — FAMOTIDINE 10 MG/ML
20 INJECTION, SOLUTION INTRAVENOUS
Status: CANCELLED | OUTPATIENT
Start: 2025-06-18

## 2025-06-18 RX ORDER — SODIUM CHLORIDE 0.9 % (FLUSH) 0.9 %
10 SYRINGE (ML) INJECTION
Status: CANCELLED | OUTPATIENT
Start: 2025-06-18

## 2025-06-18 RX ADMIN — HUMAN IMMUNOGLOBULIN G 85 G: 40 LIQUID INTRAVENOUS at 09:06

## 2025-06-18 RX ADMIN — METHYLPREDNISOLONE SODIUM SUCCINATE 125 MG: 125 INJECTION, POWDER, FOR SOLUTION INTRAMUSCULAR; INTRAVENOUS at 08:06

## 2025-06-18 NOTE — PROGRESS NOTES
Patient arrived to infusion suite for Privigen 85 g, day 1/2 infusion. Patient stated she took her premed of Tylenol 650 mg PO, Pepcid 20 mg PO and Benadryl 25 mg at home. Pt given Methylprednisolone 125 mg IVP 30 minutes prior to the start of infusion.    Privigen initiated and titrated as ordered with the following rates every 30 minutes: 25 ml/hr, 50 ml/hr, 100 ml/hr and max rate of 201 ml/hr until infusion volume completed. V/S monitored with each titration rate change and at end of infusion. Vital signs stable. Patient tolerated well.        Limited head-to-toe assessment due to privacy issues and visit reason though the opportunity was given for patient to express any concerns.

## 2025-06-19 ENCOUNTER — INFUSION (OUTPATIENT)
Dept: INFECTIOUS DISEASES | Facility: HOSPITAL | Age: 65
End: 2025-06-19
Payer: COMMERCIAL

## 2025-06-19 VITALS
SYSTOLIC BLOOD PRESSURE: 153 MMHG | TEMPERATURE: 99 F | WEIGHT: 182.13 LBS | HEIGHT: 65 IN | DIASTOLIC BLOOD PRESSURE: 87 MMHG | HEART RATE: 86 BPM | RESPIRATION RATE: 16 BRPM | OXYGEN SATURATION: 100 % | BODY MASS INDEX: 30.34 KG/M2

## 2025-06-19 DIAGNOSIS — M35.9 POLYMYOSITIS ASSOCIATED WITH AUTOIMMUNE DISEASE: Primary | ICD-10-CM

## 2025-06-19 DIAGNOSIS — M33.20 POLYMYOSITIS ASSOCIATED WITH AUTOIMMUNE DISEASE: Primary | ICD-10-CM

## 2025-06-19 PROCEDURE — 96366 THER/PROPH/DIAG IV INF ADDON: CPT

## 2025-06-19 PROCEDURE — 96365 THER/PROPH/DIAG IV INF INIT: CPT

## 2025-06-19 PROCEDURE — 63600175 PHARM REV CODE 636 W HCPCS: Mod: JZ,TB | Performed by: INTERNAL MEDICINE

## 2025-06-19 RX ORDER — DIPHENHYDRAMINE HYDROCHLORIDE 50 MG/ML
25 INJECTION, SOLUTION INTRAMUSCULAR; INTRAVENOUS
Status: DISCONTINUED | OUTPATIENT
Start: 2025-06-19 | End: 2025-06-19 | Stop reason: HOSPADM

## 2025-06-19 RX ORDER — FAMOTIDINE 10 MG/ML
20 INJECTION, SOLUTION INTRAVENOUS
Status: DISCONTINUED | OUTPATIENT
Start: 2025-06-19 | End: 2025-06-19 | Stop reason: HOSPADM

## 2025-06-19 RX ORDER — ACETAMINOPHEN 325 MG/1
650 TABLET ORAL
Status: DISCONTINUED | OUTPATIENT
Start: 2025-06-19 | End: 2025-06-19 | Stop reason: HOSPADM

## 2025-06-19 RX ORDER — FAMOTIDINE 10 MG/ML
20 INJECTION, SOLUTION INTRAVENOUS
OUTPATIENT
Start: 2025-06-19

## 2025-06-19 RX ORDER — SODIUM CHLORIDE 0.9 % (FLUSH) 0.9 %
10 SYRINGE (ML) INJECTION
Status: DISCONTINUED | OUTPATIENT
Start: 2025-06-19 | End: 2025-06-19 | Stop reason: HOSPADM

## 2025-06-19 RX ORDER — DIPHENHYDRAMINE HYDROCHLORIDE 50 MG/ML
25 INJECTION, SOLUTION INTRAMUSCULAR; INTRAVENOUS
OUTPATIENT
Start: 2025-06-19

## 2025-06-19 RX ORDER — ACETAMINOPHEN 325 MG/1
650 TABLET ORAL
OUTPATIENT
Start: 2025-06-19

## 2025-06-19 RX ORDER — HEPARIN 100 UNIT/ML
500 SYRINGE INTRAVENOUS
Status: DISCONTINUED | OUTPATIENT
Start: 2025-06-19 | End: 2025-06-19 | Stop reason: HOSPADM

## 2025-06-19 RX ORDER — HEPARIN 100 UNIT/ML
500 SYRINGE INTRAVENOUS
OUTPATIENT
Start: 2025-06-19

## 2025-06-19 RX ORDER — METHYLPREDNISOLONE SOD SUCC 125 MG
125 VIAL (EA) INJECTION
Status: DISCONTINUED | OUTPATIENT
Start: 2025-06-19 | End: 2025-06-19 | Stop reason: HOSPADM

## 2025-06-19 RX ORDER — METHYLPREDNISOLONE SOD SUCC 125 MG
125 VIAL (EA) INJECTION
Start: 2025-06-19 | End: 2025-06-19

## 2025-06-19 RX ORDER — SODIUM CHLORIDE 0.9 % (FLUSH) 0.9 %
10 SYRINGE (ML) INJECTION
OUTPATIENT
Start: 2025-06-19

## 2025-06-19 RX ADMIN — HUMAN IMMUNOGLOBULIN G 85 G: 40 LIQUID INTRAVENOUS at 08:06

## 2025-06-19 NOTE — PROGRESS NOTES
Patient arrived to infusion suite for Privigen 85 g, day 1/2 infusion. Patient stated she took her premed of Tylenol 650 mg PO, Pepcid 20 mg PO and Benadryl 25 mg at home. Pt given Methylprednisolone 125 mg IVP 30 minutes prior to the start of infusion.    Privigen initiated and titrated as ordered with the following rates every 30 minutes: 25 ml/hr, 50 ml/hr, 99 ml/hr and max rate of 198 ml/hr until infusion volume completed. V/S monitored with each titration rate change and at end of infusion. Vital signs stable. consented.        Limited head-to-toe assessment due to privacy issues and visit reason though the opportunity was given for patient to express any concerns.                
Patent

## 2025-06-27 ENCOUNTER — RESULTS FOLLOW-UP (OUTPATIENT)
Dept: RHEUMATOLOGY | Facility: CLINIC | Age: 65
End: 2025-06-27

## 2025-06-28 DIAGNOSIS — I10 BENIGN ESSENTIAL HYPERTENSION: ICD-10-CM

## 2025-06-28 DIAGNOSIS — E11.29 MICROALBUMINURIA DUE TO TYPE 2 DIABETES MELLITUS: ICD-10-CM

## 2025-06-28 DIAGNOSIS — R80.9 MICROALBUMINURIA DUE TO TYPE 2 DIABETES MELLITUS: ICD-10-CM

## 2025-06-30 RX ORDER — OLMESARTAN MEDOXOMIL 5 MG/1
5 TABLET, FILM COATED ORAL
Qty: 90 TABLET | Refills: 3 | Status: SHIPPED | OUTPATIENT
Start: 2025-06-30

## 2025-07-16 ENCOUNTER — INFUSION (OUTPATIENT)
Dept: INFECTIOUS DISEASES | Facility: HOSPITAL | Age: 65
End: 2025-07-16
Payer: COMMERCIAL

## 2025-07-16 VITALS
OXYGEN SATURATION: 100 % | WEIGHT: 183.19 LBS | TEMPERATURE: 98 F | HEART RATE: 79 BPM | RESPIRATION RATE: 20 BRPM | HEIGHT: 65 IN | DIASTOLIC BLOOD PRESSURE: 80 MMHG | BODY MASS INDEX: 30.52 KG/M2 | SYSTOLIC BLOOD PRESSURE: 133 MMHG

## 2025-07-16 DIAGNOSIS — M33.20 POLYMYOSITIS ASSOCIATED WITH AUTOIMMUNE DISEASE: Primary | ICD-10-CM

## 2025-07-16 DIAGNOSIS — M35.9 POLYMYOSITIS ASSOCIATED WITH AUTOIMMUNE DISEASE: Primary | ICD-10-CM

## 2025-07-16 PROCEDURE — 63600175 PHARM REV CODE 636 W HCPCS: Mod: JZ,TB | Performed by: INTERNAL MEDICINE

## 2025-07-16 PROCEDURE — 96365 THER/PROPH/DIAG IV INF INIT: CPT

## 2025-07-16 PROCEDURE — 96366 THER/PROPH/DIAG IV INF ADDON: CPT

## 2025-07-16 PROCEDURE — 96375 TX/PRO/DX INJ NEW DRUG ADDON: CPT

## 2025-07-16 RX ORDER — FAMOTIDINE 10 MG/ML
20 INJECTION, SOLUTION INTRAVENOUS
Status: CANCELLED | OUTPATIENT
Start: 2025-07-16

## 2025-07-16 RX ORDER — ACETAMINOPHEN 325 MG/1
650 TABLET ORAL
Status: CANCELLED | OUTPATIENT
Start: 2025-07-16

## 2025-07-16 RX ORDER — METHYLPREDNISOLONE SOD SUCC 125 MG
125 VIAL (EA) INJECTION
Status: CANCELLED
Start: 2025-07-16 | End: 2025-07-16

## 2025-07-16 RX ORDER — SODIUM CHLORIDE 0.9 % (FLUSH) 0.9 %
10 SYRINGE (ML) INJECTION
Status: DISCONTINUED | OUTPATIENT
Start: 2025-07-16 | End: 2025-07-16 | Stop reason: HOSPADM

## 2025-07-16 RX ORDER — SODIUM CHLORIDE 0.9 % (FLUSH) 0.9 %
10 SYRINGE (ML) INJECTION
Status: CANCELLED | OUTPATIENT
Start: 2025-07-16

## 2025-07-16 RX ORDER — HEPARIN 100 UNIT/ML
500 SYRINGE INTRAVENOUS
Status: CANCELLED | OUTPATIENT
Start: 2025-07-16

## 2025-07-16 RX ORDER — DIPHENHYDRAMINE HYDROCHLORIDE 50 MG/ML
25 INJECTION, SOLUTION INTRAMUSCULAR; INTRAVENOUS
Status: CANCELLED | OUTPATIENT
Start: 2025-07-16

## 2025-07-16 RX ORDER — METHYLPREDNISOLONE SOD SUCC 125 MG
125 VIAL (EA) INJECTION
Status: COMPLETED | OUTPATIENT
Start: 2025-07-16 | End: 2025-07-16

## 2025-07-16 RX ADMIN — METHYLPREDNISOLONE SODIUM SUCCINATE 125 MG: 125 INJECTION, POWDER, FOR SOLUTION INTRAMUSCULAR; INTRAVENOUS at 08:07

## 2025-07-16 RX ADMIN — HUMAN IMMUNOGLOBULIN G 85 G: 40 LIQUID INTRAVENOUS at 09:07

## 2025-07-16 NOTE — PROGRESS NOTES
Patient arrived to infusion suite for Privigen 85 g, day 1/2 infusion. Patient took her own Tylenol 650 mg PO, Benadryl 25 mg PO and Pepcid 20 mg PO at home prior to arrival. Patient received premedications of Methylprednisolone 125 mg IVP, given 30 minutes prior to the start of infusion.    Privigen initiated and titrated as ordered with the following rates every 30 minutes: 25 ml/hr, 50 ml/hr, 100 ml/hr and max rate of 199 ml/hr until infusion volume completed. Patient tolerated. VS stable.    Next appointment scheduled and patient made aware.     Limited head-to-toe assessment due to privacy issues and visit reason though the opportunity was given for patient to express any concerns.

## 2025-07-17 ENCOUNTER — LAB VISIT (OUTPATIENT)
Dept: LAB | Facility: HOSPITAL | Age: 65
End: 2025-07-17
Attending: INTERNAL MEDICINE
Payer: COMMERCIAL

## 2025-07-17 ENCOUNTER — INFUSION (OUTPATIENT)
Dept: INFECTIOUS DISEASES | Facility: HOSPITAL | Age: 65
End: 2025-07-17
Payer: COMMERCIAL

## 2025-07-17 VITALS
TEMPERATURE: 98 F | DIASTOLIC BLOOD PRESSURE: 79 MMHG | BODY MASS INDEX: 30.49 KG/M2 | HEART RATE: 91 BPM | WEIGHT: 183 LBS | OXYGEN SATURATION: 99 % | SYSTOLIC BLOOD PRESSURE: 144 MMHG | HEIGHT: 65 IN | RESPIRATION RATE: 20 BRPM

## 2025-07-17 DIAGNOSIS — M35.9 POLYMYOSITIS ASSOCIATED WITH AUTOIMMUNE DISEASE: Primary | ICD-10-CM

## 2025-07-17 DIAGNOSIS — M33.13 DERMATOMYOSITIS: ICD-10-CM

## 2025-07-17 DIAGNOSIS — M33.20 POLYMYOSITIS ASSOCIATED WITH AUTOIMMUNE DISEASE: Primary | ICD-10-CM

## 2025-07-17 LAB
ABSOLUTE EOSINOPHIL (OHS): 0.04 K/UL
ABSOLUTE MONOCYTE (OHS): 0.62 K/UL (ref 0.3–1)
ABSOLUTE NEUTROPHIL COUNT (OHS): 4.75 K/UL (ref 1.8–7.7)
ALBUMIN SERPL BCP-MCNC: 3.7 G/DL (ref 3.5–5.2)
ALP SERPL-CCNC: 77 UNIT/L (ref 40–150)
ALT SERPL W/O P-5'-P-CCNC: 17 UNIT/L (ref 10–44)
ANION GAP (OHS): 5 MMOL/L (ref 8–16)
AST SERPL-CCNC: 23 UNIT/L (ref 11–45)
BASOPHILS # BLD AUTO: 0.03 K/UL
BASOPHILS NFR BLD AUTO: 0.4 %
BILIRUB SERPL-MCNC: 0.2 MG/DL (ref 0.1–1)
BUN SERPL-MCNC: 14 MG/DL (ref 8–23)
CALCIUM SERPL-MCNC: 9.2 MG/DL (ref 8.7–10.5)
CHLORIDE SERPL-SCNC: 104 MMOL/L (ref 95–110)
CO2 SERPL-SCNC: 26 MMOL/L (ref 23–29)
CREAT SERPL-MCNC: 0.8 MG/DL (ref 0.5–1.4)
ERYTHROCYTE [DISTWIDTH] IN BLOOD BY AUTOMATED COUNT: 15 % (ref 11.5–14.5)
GFR SERPLBLD CREATININE-BSD FMLA CKD-EPI: >60 ML/MIN/1.73/M2
GLUCOSE SERPL-MCNC: 93 MG/DL (ref 70–110)
HCT VFR BLD AUTO: 34.3 % (ref 37–48.5)
HGB BLD-MCNC: 10.6 GM/DL (ref 12–16)
IMM GRANULOCYTES # BLD AUTO: 0.02 K/UL (ref 0–0.04)
IMM GRANULOCYTES NFR BLD AUTO: 0.3 % (ref 0–0.5)
LYMPHOCYTES # BLD AUTO: 1.84 K/UL (ref 1–4.8)
MCH RBC QN AUTO: 27.1 PG (ref 27–31)
MCHC RBC AUTO-ENTMCNC: 30.9 G/DL (ref 32–36)
MCV RBC AUTO: 88 FL (ref 82–98)
NUCLEATED RBC (/100WBC) (OHS): 0 /100 WBC
PLATELET # BLD AUTO: 264 K/UL (ref 150–450)
PMV BLD AUTO: 9.1 FL (ref 9.2–12.9)
POTASSIUM SERPL-SCNC: 3.6 MMOL/L (ref 3.5–5.1)
PROT SERPL-MCNC: 12.6 GM/DL (ref 6–8.4)
RBC # BLD AUTO: 3.91 M/UL (ref 4–5.4)
RELATIVE EOSINOPHIL (OHS): 0.5 %
RELATIVE LYMPHOCYTE (OHS): 25.2 % (ref 18–48)
RELATIVE MONOCYTE (OHS): 8.5 % (ref 4–15)
RELATIVE NEUTROPHIL (OHS): 65.1 % (ref 38–73)
SODIUM SERPL-SCNC: 135 MMOL/L (ref 136–145)
WBC # BLD AUTO: 7.3 K/UL (ref 3.9–12.7)

## 2025-07-17 PROCEDURE — 36415 COLL VENOUS BLD VENIPUNCTURE: CPT

## 2025-07-17 PROCEDURE — 80053 COMPREHEN METABOLIC PANEL: CPT

## 2025-07-17 PROCEDURE — 63600175 PHARM REV CODE 636 W HCPCS: Mod: JZ,TB | Performed by: INTERNAL MEDICINE

## 2025-07-17 PROCEDURE — 85025 COMPLETE CBC W/AUTO DIFF WBC: CPT

## 2025-07-17 PROCEDURE — 96365 THER/PROPH/DIAG IV INF INIT: CPT

## 2025-07-17 PROCEDURE — 96366 THER/PROPH/DIAG IV INF ADDON: CPT

## 2025-07-17 RX ORDER — SODIUM CHLORIDE 0.9 % (FLUSH) 0.9 %
10 SYRINGE (ML) INJECTION
OUTPATIENT
Start: 2025-07-17

## 2025-07-17 RX ORDER — METHYLPREDNISOLONE SOD SUCC 125 MG
125 VIAL (EA) INJECTION
Start: 2025-07-17 | End: 2025-07-17

## 2025-07-17 RX ORDER — HEPARIN 100 UNIT/ML
500 SYRINGE INTRAVENOUS
OUTPATIENT
Start: 2025-07-17

## 2025-07-17 RX ORDER — ACETAMINOPHEN 325 MG/1
650 TABLET ORAL
OUTPATIENT
Start: 2025-07-17

## 2025-07-17 RX ORDER — FAMOTIDINE 10 MG/ML
20 INJECTION, SOLUTION INTRAVENOUS
OUTPATIENT
Start: 2025-07-17

## 2025-07-17 RX ORDER — DIPHENHYDRAMINE HYDROCHLORIDE 50 MG/ML
25 INJECTION, SOLUTION INTRAMUSCULAR; INTRAVENOUS
OUTPATIENT
Start: 2025-07-17

## 2025-07-17 RX ORDER — SODIUM CHLORIDE 0.9 % (FLUSH) 0.9 %
10 SYRINGE (ML) INJECTION
Status: DISCONTINUED | OUTPATIENT
Start: 2025-07-17 | End: 2025-07-17 | Stop reason: HOSPADM

## 2025-07-17 RX ADMIN — HUMAN IMMUNOGLOBULIN G 85 G: 40 LIQUID INTRAVENOUS at 08:07

## 2025-07-19 ENCOUNTER — PATIENT MESSAGE (OUTPATIENT)
Dept: RHEUMATOLOGY | Facility: CLINIC | Age: 65
End: 2025-07-19
Payer: COMMERCIAL

## 2025-07-21 ENCOUNTER — TELEPHONE (OUTPATIENT)
Dept: RHEUMATOLOGY | Facility: CLINIC | Age: 65
End: 2025-07-21
Payer: COMMERCIAL

## 2025-07-21 DIAGNOSIS — M60.9 MYOSITIS, UNSPECIFIED MYOSITIS TYPE, UNSPECIFIED SITE: Primary | ICD-10-CM

## 2025-07-21 NOTE — TELEPHONE ENCOUNTER
----- Message from Arabella Campos MD sent at 7/21/2025  8:28 AM CDT -----  Schedule her for labs in 4 weeks.

## 2025-08-06 DIAGNOSIS — M60.9 MYOSITIS, UNSPECIFIED MYOSITIS TYPE, UNSPECIFIED SITE: Primary | ICD-10-CM

## 2025-08-13 ENCOUNTER — INFUSION (OUTPATIENT)
Dept: INFECTIOUS DISEASES | Facility: HOSPITAL | Age: 65
End: 2025-08-13
Payer: COMMERCIAL

## 2025-08-13 VITALS
OXYGEN SATURATION: 96 % | DIASTOLIC BLOOD PRESSURE: 78 MMHG | BODY MASS INDEX: 30.34 KG/M2 | TEMPERATURE: 98 F | SYSTOLIC BLOOD PRESSURE: 144 MMHG | HEART RATE: 100 BPM | WEIGHT: 182.13 LBS | RESPIRATION RATE: 20 BRPM | HEIGHT: 65 IN

## 2025-08-13 DIAGNOSIS — M33.20 POLYMYOSITIS ASSOCIATED WITH AUTOIMMUNE DISEASE: Primary | ICD-10-CM

## 2025-08-13 DIAGNOSIS — M35.9 POLYMYOSITIS ASSOCIATED WITH AUTOIMMUNE DISEASE: Primary | ICD-10-CM

## 2025-08-13 PROCEDURE — 96365 THER/PROPH/DIAG IV INF INIT: CPT

## 2025-08-13 PROCEDURE — 96366 THER/PROPH/DIAG IV INF ADDON: CPT

## 2025-08-13 PROCEDURE — 63600175 PHARM REV CODE 636 W HCPCS: Mod: JZ,TB | Performed by: INTERNAL MEDICINE

## 2025-08-13 PROCEDURE — 96375 TX/PRO/DX INJ NEW DRUG ADDON: CPT

## 2025-08-13 RX ORDER — HEPARIN 100 UNIT/ML
500 SYRINGE INTRAVENOUS
Status: CANCELLED | OUTPATIENT
Start: 2025-08-13

## 2025-08-13 RX ORDER — ACETAMINOPHEN 325 MG/1
650 TABLET ORAL
Status: CANCELLED | OUTPATIENT
Start: 2025-08-13

## 2025-08-13 RX ORDER — SODIUM CHLORIDE 0.9 % (FLUSH) 0.9 %
10 SYRINGE (ML) INJECTION
Status: DISCONTINUED | OUTPATIENT
Start: 2025-08-13 | End: 2025-08-13 | Stop reason: HOSPADM

## 2025-08-13 RX ORDER — METHYLPREDNISOLONE SOD SUCC 125 MG
125 VIAL (EA) INJECTION
Status: CANCELLED
Start: 2025-08-13 | End: 2025-08-13

## 2025-08-13 RX ORDER — FAMOTIDINE 10 MG/ML
20 INJECTION, SOLUTION INTRAVENOUS
Status: CANCELLED | OUTPATIENT
Start: 2025-08-13

## 2025-08-13 RX ORDER — SODIUM CHLORIDE 0.9 % (FLUSH) 0.9 %
10 SYRINGE (ML) INJECTION
Status: CANCELLED | OUTPATIENT
Start: 2025-08-13

## 2025-08-13 RX ORDER — METHYLPREDNISOLONE SOD SUCC 125 MG
125 VIAL (EA) INJECTION
Status: COMPLETED | OUTPATIENT
Start: 2025-08-13 | End: 2025-08-13

## 2025-08-13 RX ORDER — DIPHENHYDRAMINE HYDROCHLORIDE 50 MG/ML
25 INJECTION, SOLUTION INTRAMUSCULAR; INTRAVENOUS
Status: CANCELLED | OUTPATIENT
Start: 2025-08-13

## 2025-08-13 RX ADMIN — HUMAN IMMUNOGLOBULIN G 85 G: 40 LIQUID INTRAVENOUS at 09:08

## 2025-08-13 RX ADMIN — METHYLPREDNISOLONE SODIUM SUCCINATE 125 MG: 125 INJECTION, POWDER, FOR SOLUTION INTRAMUSCULAR; INTRAVENOUS at 08:08

## 2025-08-14 ENCOUNTER — INFUSION (OUTPATIENT)
Dept: INFECTIOUS DISEASES | Facility: HOSPITAL | Age: 65
End: 2025-08-14
Payer: COMMERCIAL

## 2025-08-14 VITALS
HEART RATE: 84 BPM | OXYGEN SATURATION: 98 % | RESPIRATION RATE: 20 BRPM | SYSTOLIC BLOOD PRESSURE: 153 MMHG | BODY MASS INDEX: 30.23 KG/M2 | HEIGHT: 65 IN | TEMPERATURE: 98 F | DIASTOLIC BLOOD PRESSURE: 85 MMHG | WEIGHT: 181.44 LBS

## 2025-08-14 DIAGNOSIS — M35.9 POLYMYOSITIS ASSOCIATED WITH AUTOIMMUNE DISEASE: Primary | ICD-10-CM

## 2025-08-14 DIAGNOSIS — M33.20 POLYMYOSITIS ASSOCIATED WITH AUTOIMMUNE DISEASE: Primary | ICD-10-CM

## 2025-08-14 PROCEDURE — 96365 THER/PROPH/DIAG IV INF INIT: CPT

## 2025-08-14 PROCEDURE — 96366 THER/PROPH/DIAG IV INF ADDON: CPT

## 2025-08-14 PROCEDURE — 63600175 PHARM REV CODE 636 W HCPCS: Mod: TB | Performed by: INTERNAL MEDICINE

## 2025-08-14 RX ORDER — HEPARIN 100 UNIT/ML
500 SYRINGE INTRAVENOUS
Status: DISCONTINUED | OUTPATIENT
Start: 2025-08-14 | End: 2025-08-14 | Stop reason: HOSPADM

## 2025-08-14 RX ORDER — ACETAMINOPHEN 325 MG/1
650 TABLET ORAL
Status: DISCONTINUED | OUTPATIENT
Start: 2025-08-14 | End: 2025-08-14 | Stop reason: HOSPADM

## 2025-08-14 RX ORDER — ACETAMINOPHEN 325 MG/1
650 TABLET ORAL
OUTPATIENT
Start: 2025-08-14

## 2025-08-14 RX ORDER — FAMOTIDINE 10 MG/ML
20 INJECTION, SOLUTION INTRAVENOUS
Status: DISCONTINUED | OUTPATIENT
Start: 2025-08-14 | End: 2025-08-14 | Stop reason: HOSPADM

## 2025-08-14 RX ORDER — HEPARIN 100 UNIT/ML
500 SYRINGE INTRAVENOUS
OUTPATIENT
Start: 2025-08-14

## 2025-08-14 RX ORDER — DIPHENHYDRAMINE HYDROCHLORIDE 50 MG/ML
25 INJECTION, SOLUTION INTRAMUSCULAR; INTRAVENOUS
OUTPATIENT
Start: 2025-08-14

## 2025-08-14 RX ORDER — SODIUM CHLORIDE 0.9 % (FLUSH) 0.9 %
10 SYRINGE (ML) INJECTION
OUTPATIENT
Start: 2025-08-14

## 2025-08-14 RX ORDER — SODIUM CHLORIDE 0.9 % (FLUSH) 0.9 %
10 SYRINGE (ML) INJECTION
Status: DISCONTINUED | OUTPATIENT
Start: 2025-08-14 | End: 2025-08-14 | Stop reason: HOSPADM

## 2025-08-14 RX ORDER — FAMOTIDINE 10 MG/ML
20 INJECTION, SOLUTION INTRAVENOUS
OUTPATIENT
Start: 2025-08-14

## 2025-08-14 RX ORDER — METHYLPREDNISOLONE SOD SUCC 125 MG
125 VIAL (EA) INJECTION
Status: DISCONTINUED | OUTPATIENT
Start: 2025-08-14 | End: 2025-08-14 | Stop reason: HOSPADM

## 2025-08-14 RX ORDER — METHYLPREDNISOLONE SOD SUCC 125 MG
125 VIAL (EA) INJECTION
Start: 2025-08-14 | End: 2025-08-14

## 2025-08-14 RX ORDER — DIPHENHYDRAMINE HYDROCHLORIDE 50 MG/ML
25 INJECTION, SOLUTION INTRAMUSCULAR; INTRAVENOUS
Status: DISCONTINUED | OUTPATIENT
Start: 2025-08-14 | End: 2025-08-14 | Stop reason: HOSPADM

## 2025-08-14 RX ADMIN — HUMAN IMMUNOGLOBULIN G 85 G: 40 LIQUID INTRAVENOUS at 09:08

## 2025-08-19 ENCOUNTER — OFFICE VISIT (OUTPATIENT)
Dept: PRIMARY CARE CLINIC | Facility: CLINIC | Age: 65
End: 2025-08-19
Payer: MEDICARE

## 2025-08-19 VITALS
HEART RATE: 88 BPM | WEIGHT: 184.63 LBS | BODY MASS INDEX: 30.76 KG/M2 | TEMPERATURE: 98 F | HEIGHT: 65 IN | SYSTOLIC BLOOD PRESSURE: 146 MMHG | OXYGEN SATURATION: 99 % | DIASTOLIC BLOOD PRESSURE: 86 MMHG

## 2025-08-19 DIAGNOSIS — E78.5 HYPERLIPIDEMIA, UNSPECIFIED HYPERLIPIDEMIA TYPE: ICD-10-CM

## 2025-08-19 DIAGNOSIS — I10 BENIGN ESSENTIAL HYPERTENSION: ICD-10-CM

## 2025-08-19 DIAGNOSIS — E11.29 MICROALBUMINURIA DUE TO TYPE 2 DIABETES MELLITUS: ICD-10-CM

## 2025-08-19 DIAGNOSIS — R20.2 PARESTHESIA: ICD-10-CM

## 2025-08-19 DIAGNOSIS — M33.13 DERMATOMYOSITIS: Primary | ICD-10-CM

## 2025-08-19 DIAGNOSIS — D64.9 NORMOCYTIC ANEMIA: ICD-10-CM

## 2025-08-19 DIAGNOSIS — R80.9 MICROALBUMINURIA DUE TO TYPE 2 DIABETES MELLITUS: ICD-10-CM

## 2025-08-19 PROBLEM — R73.03 PRE-DIABETES: Status: RESOLVED | Noted: 2018-03-19 | Resolved: 2025-08-19

## 2025-08-19 PROCEDURE — 3008F BODY MASS INDEX DOCD: CPT | Mod: CPTII,S$GLB,, | Performed by: INTERNAL MEDICINE

## 2025-08-19 PROCEDURE — 99214 OFFICE O/P EST MOD 30 MIN: CPT | Mod: S$GLB,,, | Performed by: INTERNAL MEDICINE

## 2025-08-19 PROCEDURE — 3044F HG A1C LEVEL LT 7.0%: CPT | Mod: CPTII,S$GLB,, | Performed by: INTERNAL MEDICINE

## 2025-08-19 PROCEDURE — 1125F AMNT PAIN NOTED PAIN PRSNT: CPT | Mod: CPTII,S$GLB,, | Performed by: INTERNAL MEDICINE

## 2025-08-19 PROCEDURE — 3288F FALL RISK ASSESSMENT DOCD: CPT | Mod: CPTII,S$GLB,, | Performed by: INTERNAL MEDICINE

## 2025-08-19 PROCEDURE — 3061F NEG MICROALBUMINURIA REV: CPT | Mod: CPTII,S$GLB,, | Performed by: INTERNAL MEDICINE

## 2025-08-19 PROCEDURE — 99999 PR PBB SHADOW E&M-EST. PATIENT-LVL IV: CPT | Mod: PBBFAC,,, | Performed by: INTERNAL MEDICINE

## 2025-08-19 PROCEDURE — G2211 COMPLEX E/M VISIT ADD ON: HCPCS | Mod: S$GLB,,, | Performed by: INTERNAL MEDICINE

## 2025-08-19 PROCEDURE — 3079F DIAST BP 80-89 MM HG: CPT | Mod: CPTII,S$GLB,, | Performed by: INTERNAL MEDICINE

## 2025-08-19 PROCEDURE — 1160F RVW MEDS BY RX/DR IN RCRD: CPT | Mod: CPTII,S$GLB,, | Performed by: INTERNAL MEDICINE

## 2025-08-19 PROCEDURE — 4010F ACE/ARB THERAPY RXD/TAKEN: CPT | Mod: CPTII,S$GLB,, | Performed by: INTERNAL MEDICINE

## 2025-08-19 PROCEDURE — 1159F MED LIST DOCD IN RCRD: CPT | Mod: CPTII,S$GLB,, | Performed by: INTERNAL MEDICINE

## 2025-08-19 PROCEDURE — 1101F PT FALLS ASSESS-DOCD LE1/YR: CPT | Mod: CPTII,S$GLB,, | Performed by: INTERNAL MEDICINE

## 2025-08-19 PROCEDURE — 3066F NEPHROPATHY DOC TX: CPT | Mod: CPTII,S$GLB,, | Performed by: INTERNAL MEDICINE

## 2025-08-19 PROCEDURE — 3077F SYST BP >= 140 MM HG: CPT | Mod: CPTII,S$GLB,, | Performed by: INTERNAL MEDICINE

## 2025-08-19 RX ORDER — OLMESARTAN MEDOXOMIL 5 MG/1
5 TABLET, FILM COATED ORAL 2 TIMES DAILY
Qty: 180 TABLET | Refills: 3 | Status: SHIPPED | OUTPATIENT
Start: 2025-08-19

## 2025-08-26 ENCOUNTER — TELEPHONE (OUTPATIENT)
Dept: PRIMARY CARE CLINIC | Facility: CLINIC | Age: 65
End: 2025-08-26
Payer: COMMERCIAL

## 2025-08-26 DIAGNOSIS — K76.0 METABOLIC DYSFUNCTION-ASSOCIATED STEATOTIC LIVER DISEASE (MASLD): Primary | ICD-10-CM

## (undated) DEVICE — CORD BIPOLAR 12 DISP STERILE

## (undated) DEVICE — STAPLER SKIN ROTATING HEAD

## (undated) DEVICE — SUT 2/0 30IN SILK BLK BRAI

## (undated) DEVICE — SOL 9P NACL IRR PIC IL

## (undated) DEVICE — DRAPE C ARM 42 X 120 10/BX

## (undated) DEVICE — CLIP HEMO TITANIUM MED

## (undated) DEVICE — CLAMP SINGLE MICRO.

## (undated) DEVICE — SEE MEDLINE ITEM 152622

## (undated) DEVICE — SEE MEDLINE ITEM 157110

## (undated) DEVICE — GLOVE BIOGEL SKINSENSE PI 7.0

## (undated) DEVICE — SYRINGE 0.9% NACL 10MIL PREFIL

## (undated) DEVICE — GAUZE SPONGE 4X4 12PLY

## (undated) DEVICE — CLIP HEMO TITANIUM SM 10/CQ

## (undated) DEVICE — NDL 18GA X1 1/2 REG BEVEL

## (undated) DEVICE — WARMER DRAPE STERILE LF

## (undated) DEVICE — SYS PRINEO SKIN CLOSURE

## (undated) DEVICE — CLIP DOUBLE MICRO.

## (undated) DEVICE — BINDER ABDOMINAL 9 30-45

## (undated) DEVICE — ELECTRODE REM PLYHSV RETURN 9

## (undated) DEVICE — GLOVE BIOGEL SKINSENSE PI 8.0

## (undated) DEVICE — GLOVE BIOGEL SKINSENSE PI 7.5

## (undated) DEVICE — BRA CLASSIC COMFORT 40 BLACK

## (undated) DEVICE — SCRUB 10% POVIDONE IODINE 4OZ

## (undated) DEVICE — SUT VICRYL PLUS 4-0 PS2 27

## (undated) DEVICE — SUT VICRYL 2-0 36 CT-1

## (undated) DEVICE — APPLICATOR CHLORAPREP ORN 26ML

## (undated) DEVICE — GLOVE BIOGEL SKINSENSE PI 6.5

## (undated) DEVICE — SUT 0 VICRYL PLUS CT-1 27IN

## (undated) DEVICE — Device

## (undated) DEVICE — ELECTRODE BLD EXT INSUL 1

## (undated) DEVICE — SEE MEDLINE ITEM 157131

## (undated) DEVICE — BLADE ELECTRO EXTENDED.

## (undated) DEVICE — HOLDER DRAIN POUCH PINK

## (undated) DEVICE — KIT DYE SPY ELITE

## (undated) DEVICE — POSITIONER HEAD DONUT 9IN FOAM

## (undated) DEVICE — SUT 9/0 5IN ETHILON BLK MON

## (undated) DEVICE — CONTAINER SPECIMEN STRL 4OZ

## (undated) DEVICE — SUT STRATAFIX PDO 2-0 MH

## (undated) DEVICE — SPONGE LAP 18X18 PREWASHED

## (undated) DEVICE — SEE MEDLINE ITEM 146417

## (undated) DEVICE — ELECTRODE BLADE INSULATED 1 IN

## (undated) DEVICE — SOL NACL 0.9% INJ PF/50151

## (undated) DEVICE — TRAY MINOR GEN SURG

## (undated) DEVICE — BLADE SURG CARBON STEEL SZ11

## (undated) DEVICE — SUT MONOCRYL PLUS UD 3-0 27

## (undated) DEVICE — GIRDLE ZIPPERED XXXXXL BLACK

## (undated) DEVICE — UNDERGLOVE BIOGEL PI SZ 6.5 LF

## (undated) DEVICE — SUT VICRYL PLUS 4-0 P3 18IN

## (undated) DEVICE — DRAPE THYROID WITH ARMBOARD

## (undated) DEVICE — SEE MEDLINE ITEM 152537

## (undated) DEVICE — DRESSING XEROFORM FOIL PK 1X8

## (undated) DEVICE — UNDERGLOVES BIOGEL PI SZ 7 LF

## (undated) DEVICE — SEE MEDLINE ITEM 153151

## (undated) DEVICE — POSITIONER HEEL FOAM CONVOLTD

## (undated) DEVICE — CLIPPER BLADE MOD 4406 (CAREF)

## (undated) DEVICE — BLADE PEAK PLASMA

## (undated) DEVICE — DRAIN CHANNEL ROUND 15FR

## (undated) DEVICE — SUT MONOCRYL 4-0 PS-2

## (undated) DEVICE — PAD ABD 8X10 STERILE

## (undated) DEVICE — SUT STRATAFIX PGAPCL 3 FS-1

## (undated) DEVICE — BRA CLASSIC COMFORT 44 BLACK

## (undated) DEVICE — SUT PROLENE 4-0 FS-2 BL MON

## (undated) DEVICE — BLADE SURG #15 CARBON STEEL